# Patient Record
Sex: FEMALE | Race: WHITE | NOT HISPANIC OR LATINO | Employment: OTHER | URBAN - METROPOLITAN AREA
[De-identification: names, ages, dates, MRNs, and addresses within clinical notes are randomized per-mention and may not be internally consistent; named-entity substitution may affect disease eponyms.]

---

## 2017-11-01 ENCOUNTER — APPOINTMENT (OUTPATIENT)
Dept: ULTRASOUND IMAGING | Facility: CLINIC | Age: 57
DRG: 439 | End: 2017-11-01
Attending: STUDENT IN AN ORGANIZED HEALTH CARE EDUCATION/TRAINING PROGRAM

## 2017-11-01 ENCOUNTER — APPOINTMENT (OUTPATIENT)
Dept: CT IMAGING | Facility: CLINIC | Age: 57
DRG: 439 | End: 2017-11-01
Attending: STUDENT IN AN ORGANIZED HEALTH CARE EDUCATION/TRAINING PROGRAM

## 2017-11-01 ENCOUNTER — HOSPITAL ENCOUNTER (INPATIENT)
Facility: CLINIC | Age: 57
LOS: 7 days | Discharge: HOME | DRG: 439 | End: 2017-11-08
Attending: STUDENT IN AN ORGANIZED HEALTH CARE EDUCATION/TRAINING PROGRAM | Admitting: INTERNAL MEDICINE

## 2017-11-01 DIAGNOSIS — K85.90 ACUTE PANCREATITIS WITHOUT INFECTION OR NECROSIS, UNSPECIFIED PANCREATITIS TYPE: ICD-10-CM

## 2017-11-01 DIAGNOSIS — K86.89 PANCREATIC MASS: ICD-10-CM

## 2017-11-01 DIAGNOSIS — K85.91 ACUTE PANCREATITIS WITH UNINFECTED NECROSIS, UNSPECIFIED PANCREATITIS TYPE: Primary | ICD-10-CM

## 2017-11-01 PROBLEM — D72.829 LEUKOCYTOSIS: Status: ACTIVE | Noted: 2017-11-01

## 2017-11-01 PROBLEM — K76.0 FATTY LIVER: Status: ACTIVE | Noted: 2017-11-01

## 2017-11-01 LAB
ALBUMIN SERPL-MCNC: 4 G/DL (ref 3.4–5)
ALP SERPL-CCNC: 79 U/L (ref 40–150)
ALT SERPL W P-5'-P-CCNC: 37 U/L (ref 0–50)
ANION GAP SERPL CALCULATED.3IONS-SCNC: 11 MMOL/L (ref 3–14)
ANION GAP SERPL CALCULATED.3IONS-SCNC: 7 MMOL/L (ref 3–14)
AST SERPL W P-5'-P-CCNC: 28 U/L (ref 0–45)
BASOPHILS # BLD AUTO: 0 10E9/L (ref 0–0.2)
BASOPHILS # BLD AUTO: 0 10E9/L (ref 0–0.2)
BASOPHILS NFR BLD AUTO: 0.1 %
BASOPHILS NFR BLD AUTO: 0.2 %
BILIRUB SERPL-MCNC: 0.5 MG/DL (ref 0.2–1.3)
BUN SERPL-MCNC: 17 MG/DL (ref 7–30)
BUN SERPL-MCNC: 18 MG/DL (ref 7–30)
CALCIUM SERPL-MCNC: 8.3 MG/DL (ref 8.5–10.1)
CALCIUM SERPL-MCNC: 8.9 MG/DL (ref 8.5–10.1)
CHLORIDE SERPL-SCNC: 106 MMOL/L (ref 94–109)
CHLORIDE SERPL-SCNC: 107 MMOL/L (ref 94–109)
CHOLEST SERPL-MCNC: 200 MG/DL
CO2 SERPL-SCNC: 23 MMOL/L (ref 20–32)
CO2 SERPL-SCNC: 24 MMOL/L (ref 20–32)
CREAT SERPL-MCNC: 0.71 MG/DL (ref 0.52–1.04)
CREAT SERPL-MCNC: 0.92 MG/DL (ref 0.52–1.04)
DIFFERENTIAL METHOD BLD: ABNORMAL
DIFFERENTIAL METHOD BLD: ABNORMAL
EOSINOPHIL # BLD AUTO: 0 10E9/L (ref 0–0.7)
EOSINOPHIL # BLD AUTO: 0 10E9/L (ref 0–0.7)
EOSINOPHIL NFR BLD AUTO: 0 %
EOSINOPHIL NFR BLD AUTO: 0.1 %
ERYTHROCYTE [DISTWIDTH] IN BLOOD BY AUTOMATED COUNT: 11.9 % (ref 10–15)
ERYTHROCYTE [DISTWIDTH] IN BLOOD BY AUTOMATED COUNT: 12 % (ref 10–15)
ETHANOL SERPL-MCNC: <0.01 G/DL
GFR SERPL CREATININE-BSD FRML MDRD: 63 ML/MIN/1.7M2
GFR SERPL CREATININE-BSD FRML MDRD: 85 ML/MIN/1.7M2
GLUCOSE SERPL-MCNC: 168 MG/DL (ref 70–99)
GLUCOSE SERPL-MCNC: 185 MG/DL (ref 70–99)
HCT VFR BLD AUTO: 42.5 % (ref 35–47)
HCT VFR BLD AUTO: 44.6 % (ref 35–47)
HDLC SERPL-MCNC: 59 MG/DL
HGB BLD-MCNC: 14.3 G/DL (ref 11.7–15.7)
HGB BLD-MCNC: 15 G/DL (ref 11.7–15.7)
IMM GRANULOCYTES # BLD: 0 10E9/L (ref 0–0.4)
IMM GRANULOCYTES # BLD: 0.1 10E9/L (ref 0–0.4)
IMM GRANULOCYTES NFR BLD: 0.2 %
IMM GRANULOCYTES NFR BLD: 0.4 %
LACTATE BLD-SCNC: 2.3 MMOL/L (ref 0.7–2)
LDLC SERPL CALC-MCNC: 108 MG/DL
LIPASE SERPL-CCNC: ABNORMAL U/L (ref 73–393)
LYMPHOCYTES # BLD AUTO: 0.9 10E9/L (ref 0.8–5.3)
LYMPHOCYTES # BLD AUTO: 3.3 10E9/L (ref 0.8–5.3)
LYMPHOCYTES NFR BLD AUTO: 14 %
LYMPHOCYTES NFR BLD AUTO: 5.7 %
MCH RBC QN AUTO: 32.4 PG (ref 26.5–33)
MCH RBC QN AUTO: 32.5 PG (ref 26.5–33)
MCHC RBC AUTO-ENTMCNC: 33.6 G/DL (ref 31.5–36.5)
MCHC RBC AUTO-ENTMCNC: 33.6 G/DL (ref 31.5–36.5)
MCV RBC AUTO: 96 FL (ref 78–100)
MCV RBC AUTO: 97 FL (ref 78–100)
MONOCYTES # BLD AUTO: 0.8 10E9/L (ref 0–1.3)
MONOCYTES # BLD AUTO: 1.8 10E9/L (ref 0–1.3)
MONOCYTES NFR BLD AUTO: 5.3 %
MONOCYTES NFR BLD AUTO: 7.9 %
NEUTROPHILS # BLD AUTO: 13.1 10E9/L (ref 1.6–8.3)
NEUTROPHILS # BLD AUTO: 18 10E9/L (ref 1.6–8.3)
NEUTROPHILS NFR BLD AUTO: 77.4 %
NEUTROPHILS NFR BLD AUTO: 88.7 %
NONHDLC SERPL-MCNC: 141 MG/DL
PLATELET # BLD AUTO: 235 10E9/L (ref 150–450)
PLATELET # BLD AUTO: 313 10E9/L (ref 150–450)
POTASSIUM SERPL-SCNC: 3.6 MMOL/L (ref 3.4–5.3)
POTASSIUM SERPL-SCNC: 4.4 MMOL/L (ref 3.4–5.3)
PROT SERPL-MCNC: 7.9 G/DL (ref 6.8–8.8)
RBC # BLD AUTO: 4.41 10E12/L (ref 3.8–5.2)
RBC # BLD AUTO: 4.62 10E12/L (ref 3.8–5.2)
SODIUM SERPL-SCNC: 138 MMOL/L (ref 133–144)
SODIUM SERPL-SCNC: 140 MMOL/L (ref 133–144)
TRIGL SERPL-MCNC: 167 MG/DL
TROPONIN I SERPL-MCNC: <0.015 UG/L (ref 0–0.04)
WBC # BLD AUTO: 14.8 10E9/L (ref 4–11)
WBC # BLD AUTO: 23.2 10E9/L (ref 4–11)

## 2017-11-01 PROCEDURE — 76705 ECHO EXAM OF ABDOMEN: CPT

## 2017-11-01 PROCEDURE — 25000128 H RX IP 250 OP 636: Performed by: STUDENT IN AN ORGANIZED HEALTH CARE EDUCATION/TRAINING PROGRAM

## 2017-11-01 PROCEDURE — 25000128 H RX IP 250 OP 636: Performed by: PHYSICIAN ASSISTANT

## 2017-11-01 PROCEDURE — 76705 ECHO EXAM OF ABDOMEN: CPT | Mod: 26 | Performed by: STUDENT IN AN ORGANIZED HEALTH CARE EDUCATION/TRAINING PROGRAM

## 2017-11-01 PROCEDURE — 99285 EMERGENCY DEPT VISIT HI MDM: CPT | Mod: 25 | Performed by: STUDENT IN AN ORGANIZED HEALTH CARE EDUCATION/TRAINING PROGRAM

## 2017-11-01 PROCEDURE — 93005 ELECTROCARDIOGRAM TRACING: CPT

## 2017-11-01 PROCEDURE — 96376 TX/PRO/DX INJ SAME DRUG ADON: CPT

## 2017-11-01 PROCEDURE — 93010 ELECTROCARDIOGRAM REPORT: CPT | Mod: Z6 | Performed by: STUDENT IN AN ORGANIZED HEALTH CARE EDUCATION/TRAINING PROGRAM

## 2017-11-01 PROCEDURE — 80320 DRUG SCREEN QUANTALCOHOLS: CPT | Performed by: STUDENT IN AN ORGANIZED HEALTH CARE EDUCATION/TRAINING PROGRAM

## 2017-11-01 PROCEDURE — 25000125 ZZHC RX 250: Performed by: STUDENT IN AN ORGANIZED HEALTH CARE EDUCATION/TRAINING PROGRAM

## 2017-11-01 PROCEDURE — 99285 EMERGENCY DEPT VISIT HI MDM: CPT | Mod: 25

## 2017-11-01 PROCEDURE — 80048 BASIC METABOLIC PNL TOTAL CA: CPT | Performed by: PHYSICIAN ASSISTANT

## 2017-11-01 PROCEDURE — 83690 ASSAY OF LIPASE: CPT | Performed by: STUDENT IN AN ORGANIZED HEALTH CARE EDUCATION/TRAINING PROGRAM

## 2017-11-01 PROCEDURE — 84484 ASSAY OF TROPONIN QUANT: CPT | Performed by: STUDENT IN AN ORGANIZED HEALTH CARE EDUCATION/TRAINING PROGRAM

## 2017-11-01 PROCEDURE — 85025 COMPLETE CBC W/AUTO DIFF WBC: CPT | Performed by: STUDENT IN AN ORGANIZED HEALTH CARE EDUCATION/TRAINING PROGRAM

## 2017-11-01 PROCEDURE — 83605 ASSAY OF LACTIC ACID: CPT | Performed by: PHYSICIAN ASSISTANT

## 2017-11-01 PROCEDURE — 96375 TX/PRO/DX INJ NEW DRUG ADDON: CPT

## 2017-11-01 PROCEDURE — 80053 COMPREHEN METABOLIC PANEL: CPT | Performed by: STUDENT IN AN ORGANIZED HEALTH CARE EDUCATION/TRAINING PROGRAM

## 2017-11-01 PROCEDURE — 99221 1ST HOSP IP/OBS SF/LOW 40: CPT | Mod: AI | Performed by: PHYSICIAN ASSISTANT

## 2017-11-01 PROCEDURE — 36415 COLL VENOUS BLD VENIPUNCTURE: CPT | Performed by: PHYSICIAN ASSISTANT

## 2017-11-01 PROCEDURE — 99207 ZZC CDG-HISTORY COMP: MEETS EXP. PROB FOCUSED- DOWN CODED LACK OF PFSH: CPT | Performed by: PHYSICIAN ASSISTANT

## 2017-11-01 PROCEDURE — 74177 CT ABD & PELVIS W/CONTRAST: CPT

## 2017-11-01 PROCEDURE — 96361 HYDRATE IV INFUSION ADD-ON: CPT

## 2017-11-01 PROCEDURE — 20000003 ZZH R&B ICU

## 2017-11-01 PROCEDURE — 80061 LIPID PANEL: CPT | Performed by: STUDENT IN AN ORGANIZED HEALTH CARE EDUCATION/TRAINING PROGRAM

## 2017-11-01 PROCEDURE — 96374 THER/PROPH/DIAG INJ IV PUSH: CPT

## 2017-11-01 PROCEDURE — 85025 COMPLETE CBC W/AUTO DIFF WBC: CPT | Performed by: PHYSICIAN ASSISTANT

## 2017-11-01 PROCEDURE — 84145 PROCALCITONIN (PCT): CPT | Performed by: PHYSICIAN ASSISTANT

## 2017-11-01 RX ORDER — KETOROLAC TROMETHAMINE 30 MG/ML
15 INJECTION, SOLUTION INTRAMUSCULAR; INTRAVENOUS ONCE
Status: COMPLETED | OUTPATIENT
Start: 2017-11-01 | End: 2017-11-01

## 2017-11-01 RX ORDER — ACETAMINOPHEN 325 MG/1
650 TABLET ORAL EVERY 4 HOURS PRN
Status: DISCONTINUED | OUTPATIENT
Start: 2017-11-01 | End: 2017-11-08 | Stop reason: HOSPADM

## 2017-11-01 RX ORDER — DIPHENHYDRAMINE HYDROCHLORIDE 50 MG/ML
12.5 INJECTION INTRAMUSCULAR; INTRAVENOUS ONCE
Status: COMPLETED | OUTPATIENT
Start: 2017-11-01 | End: 2017-11-01

## 2017-11-01 RX ORDER — ONDANSETRON 2 MG/ML
4 INJECTION INTRAMUSCULAR; INTRAVENOUS ONCE
Status: COMPLETED | OUTPATIENT
Start: 2017-11-01 | End: 2017-11-01

## 2017-11-01 RX ORDER — NALOXONE HYDROCHLORIDE 0.4 MG/ML
.1-.4 INJECTION, SOLUTION INTRAMUSCULAR; INTRAVENOUS; SUBCUTANEOUS
Status: DISCONTINUED | OUTPATIENT
Start: 2017-11-01 | End: 2017-11-03

## 2017-11-01 RX ORDER — ONDANSETRON 4 MG/1
4 TABLET, ORALLY DISINTEGRATING ORAL EVERY 6 HOURS PRN
Status: DISCONTINUED | OUTPATIENT
Start: 2017-11-01 | End: 2017-11-08 | Stop reason: HOSPADM

## 2017-11-01 RX ORDER — HYDROMORPHONE HYDROCHLORIDE 1 MG/ML
.5-1 INJECTION, SOLUTION INTRAMUSCULAR; INTRAVENOUS; SUBCUTANEOUS
Status: COMPLETED | OUTPATIENT
Start: 2017-11-01 | End: 2017-11-02

## 2017-11-01 RX ORDER — PROCHLORPERAZINE 25 MG
25 SUPPOSITORY, RECTAL RECTAL EVERY 12 HOURS PRN
Status: DISCONTINUED | OUTPATIENT
Start: 2017-11-01 | End: 2017-11-01

## 2017-11-01 RX ORDER — HYDROMORPHONE HYDROCHLORIDE 1 MG/ML
0.5 INJECTION, SOLUTION INTRAMUSCULAR; INTRAVENOUS; SUBCUTANEOUS
Status: DISCONTINUED | OUTPATIENT
Start: 2017-11-01 | End: 2017-11-01

## 2017-11-01 RX ORDER — NALOXONE HYDROCHLORIDE 0.4 MG/ML
.1-.4 INJECTION, SOLUTION INTRAMUSCULAR; INTRAVENOUS; SUBCUTANEOUS
Status: DISCONTINUED | OUTPATIENT
Start: 2017-11-01 | End: 2017-11-01

## 2017-11-01 RX ORDER — PROCHLORPERAZINE MALEATE 10 MG
10 TABLET ORAL EVERY 6 HOURS PRN
Status: DISCONTINUED | OUTPATIENT
Start: 2017-11-01 | End: 2017-11-08 | Stop reason: HOSPADM

## 2017-11-01 RX ORDER — ONDANSETRON 2 MG/ML
4 INJECTION INTRAMUSCULAR; INTRAVENOUS EVERY 6 HOURS PRN
Status: DISCONTINUED | OUTPATIENT
Start: 2017-11-01 | End: 2017-11-08 | Stop reason: HOSPADM

## 2017-11-01 RX ORDER — IOPAMIDOL 755 MG/ML
86 INJECTION, SOLUTION INTRAVASCULAR ONCE
Status: COMPLETED | OUTPATIENT
Start: 2017-11-01 | End: 2017-11-01

## 2017-11-01 RX ORDER — LORAZEPAM 2 MG/ML
1-2 INJECTION INTRAMUSCULAR EVERY 30 MIN PRN
Status: DISCONTINUED | OUTPATIENT
Start: 2017-11-01 | End: 2017-11-05

## 2017-11-01 RX ORDER — PROCHLORPERAZINE MALEATE 10 MG
10 TABLET ORAL EVERY 6 HOURS PRN
Status: DISCONTINUED | OUTPATIENT
Start: 2017-11-01 | End: 2017-11-01

## 2017-11-01 RX ORDER — LORAZEPAM 1 MG/1
1-2 TABLET ORAL EVERY 30 MIN PRN
Status: DISCONTINUED | OUTPATIENT
Start: 2017-11-01 | End: 2017-11-05

## 2017-11-01 RX ORDER — ONDANSETRON 4 MG/1
4 TABLET, ORALLY DISINTEGRATING ORAL EVERY 6 HOURS PRN
Status: DISCONTINUED | OUTPATIENT
Start: 2017-11-01 | End: 2017-11-01

## 2017-11-01 RX ORDER — SODIUM CHLORIDE, SODIUM LACTATE, POTASSIUM CHLORIDE, CALCIUM CHLORIDE 600; 310; 30; 20 MG/100ML; MG/100ML; MG/100ML; MG/100ML
INJECTION, SOLUTION INTRAVENOUS CONTINUOUS
Status: ACTIVE | OUTPATIENT
Start: 2017-11-01 | End: 2017-11-02

## 2017-11-01 RX ORDER — ONDANSETRON 2 MG/ML
4 INJECTION INTRAMUSCULAR; INTRAVENOUS EVERY 6 HOURS PRN
Status: DISCONTINUED | OUTPATIENT
Start: 2017-11-01 | End: 2017-11-01

## 2017-11-01 RX ORDER — PROCHLORPERAZINE 25 MG
25 SUPPOSITORY, RECTAL RECTAL EVERY 12 HOURS PRN
Status: DISCONTINUED | OUTPATIENT
Start: 2017-11-01 | End: 2017-11-08 | Stop reason: HOSPADM

## 2017-11-01 RX ADMIN — SODIUM CHLORIDE, POTASSIUM CHLORIDE, SODIUM LACTATE AND CALCIUM CHLORIDE 1000 ML: 600; 310; 30; 20 INJECTION, SOLUTION INTRAVENOUS at 15:59

## 2017-11-01 RX ADMIN — HYDROMORPHONE HYDROCHLORIDE 0.5 MG: 1 INJECTION, SOLUTION INTRAMUSCULAR; INTRAVENOUS; SUBCUTANEOUS at 19:06

## 2017-11-01 RX ADMIN — ONDANSETRON 4 MG: 2 INJECTION INTRAMUSCULAR; INTRAVENOUS at 16:02

## 2017-11-01 RX ADMIN — PROCHLORPERAZINE EDISYLATE 10 MG: 5 INJECTION INTRAMUSCULAR; INTRAVENOUS at 20:03

## 2017-11-01 RX ADMIN — SODIUM CHLORIDE 63 ML: 9 INJECTION, SOLUTION INTRAVENOUS at 16:29

## 2017-11-01 RX ADMIN — SODIUM CHLORIDE, POTASSIUM CHLORIDE, SODIUM LACTATE AND CALCIUM CHLORIDE 1000 ML: 600; 310; 30; 20 INJECTION, SOLUTION INTRAVENOUS at 18:36

## 2017-11-01 RX ADMIN — KETOROLAC TROMETHAMINE 15 MG: 30 INJECTION, SOLUTION INTRAMUSCULAR at 15:59

## 2017-11-01 RX ADMIN — HYDROMORPHONE HYDROCHLORIDE 0.5 MG: 1 INJECTION, SOLUTION INTRAMUSCULAR; INTRAVENOUS; SUBCUTANEOUS at 20:37

## 2017-11-01 RX ADMIN — ENOXAPARIN SODIUM 40 MG: 40 INJECTION SUBCUTANEOUS at 22:24

## 2017-11-01 RX ADMIN — HYDROMORPHONE HYDROCHLORIDE 0.5 MG: 1 INJECTION, SOLUTION INTRAMUSCULAR; INTRAVENOUS; SUBCUTANEOUS at 23:30

## 2017-11-01 RX ADMIN — SODIUM CHLORIDE, POTASSIUM CHLORIDE, SODIUM LACTATE AND CALCIUM CHLORIDE: 600; 310; 30; 20 INJECTION, SOLUTION INTRAVENOUS at 21:30

## 2017-11-01 RX ADMIN — IOPAMIDOL 86 ML: 755 INJECTION, SOLUTION INTRAVENOUS at 16:29

## 2017-11-01 RX ADMIN — HYDROMORPHONE HYDROCHLORIDE 0.5 MG: 1 INJECTION, SOLUTION INTRAMUSCULAR; INTRAVENOUS; SUBCUTANEOUS at 22:24

## 2017-11-01 RX ADMIN — DIPHENHYDRAMINE HYDROCHLORIDE 12.5 MG: 50 INJECTION, SOLUTION INTRAMUSCULAR; INTRAVENOUS at 20:03

## 2017-11-01 RX ADMIN — SODIUM CHLORIDE, POTASSIUM CHLORIDE, SODIUM LACTATE AND CALCIUM CHLORIDE 1000 ML: 600; 310; 30; 20 INJECTION, SOLUTION INTRAVENOUS at 19:43

## 2017-11-01 RX ADMIN — HYDROMORPHONE HYDROCHLORIDE 0.5 MG: 1 INJECTION, SOLUTION INTRAMUSCULAR; INTRAVENOUS; SUBCUTANEOUS at 21:30

## 2017-11-01 RX ADMIN — ONDANSETRON 4 MG: 2 INJECTION INTRAMUSCULAR; INTRAVENOUS at 19:28

## 2017-11-01 ASSESSMENT — PAIN DESCRIPTION - DESCRIPTORS
DESCRIPTORS: ACHING

## 2017-11-01 NOTE — ED NOTES
Bed: ED20  Expected date: 11/1/17  Expected time: 3:36 PM  Means of arrival: Ambulance  Comments:  R sided abd pain

## 2017-11-01 NOTE — ED NOTES
R side abdominal pain started earlier today.  1 episode of diarrhea after pain started.  EMS found pt laying in bed, grimacing.  Pt had sudden onset of pain around 1 pm.  Nausea and vomiting with pain.  Pt had a few meds in ambulance, continues to have pain.  Pt has bluish lips, slightly pale.

## 2017-11-01 NOTE — IP AVS SNAPSHOT
MRN:[id number]                      After Visit Summary   11/1/2017    Kitty Bangura    MRN: [id number]           Thank you!     Thank you for choosing [facility] for your care. Our goal is always to provide you with excellent care. Hearing back from our patients is one way we can continue to improve our services. Please take a few minutes to complete the written survey that you may receive in the mail after you visit with us. Thank you!        Patient Information     Date Of Birth          1960        Designated Caregiver       Most Recent Value    Caregiver    Will someone help with your care after discharge? yes    Name of designated caregiver Andrew - spouse    Phone number of caregiver [phone]    Caregiver address [address], [place], [place]      About your hospital stay     You were admitted on:  November 1, 2017 You last received care in the:  [facility]    You were discharged on:  November 8, 2017        Reason for your hospital stay       Acute necrotizing pancreatitis                  Who to Call     For medical emergencies, please call 911.  For non-urgent questions about your medical care, please call your primary care provider or clinic, None          Attending Provider     Provider Specialty    Irineo Fuentes,  Emergency Medicine    Anais Norton MD Internal Medicine    IshStanley MD Family Practice    Belen Lord MD Internal Medicine       Primary Care Provider    Physician No Ref-Primary      Follow-up Appointments     Adult [facility] Follow-up and recommended labs and tests       Needs appt with Dr Alex Sanches in 1 week    Appointments on [place] and/or [facility] (with [facility] or [facility] provider or service). Call [phone] if you haven't heard regarding these appointments within 7 days of discharge.                  Additional Services     GASTROENTEROLOGY ADULT REF CONSULT ONLY       [facility]  [phone] - liver team - Dr. Sanches recommended outpatient follow-up       Please be aware that coverage of these services is subject to the terms and limitations of your health insurance plan.  Call member services at your health plan with any benefit or coverage questions.  Any procedures must be performed at a [facility] facility OR coordinated by your clinic's referral office.    Please bring the following with you to your appointment:    (1) Any X-Rays, CTs or MRIs which have been performed.  Contact the facility where they were done to arrange for  prior to your scheduled appointment.    (2) List of current medications   (3) This referral request   (4) Any documents/labs given to you for this referral                  Further instructions from your care team       Behavioral/Mental Health Resources  [place], [place], [place], [place], [place], [place], [place], [place] and [facility]    **Patient should check with their health insurance to identify providers in network**    Crisis Lines:   -[place]: [place] Crisis Connection: (927) 694-5741/1-832.527.7764   -[place]: (789) 831-7277    -[place]/ [place]/ [place]/ [place]/ [place]: [phone]   -[facility]: [facility] Crisis: [phone]   -[place] and [place]: [facility] Crisis Team (518) [phone] or 1-875.237.9008     Call the National Suicide Prevention Lifeline at 7-519-384-EJYN (9188) to be connected to a counselor at a crisis center in your area if you, a family member, or friend are experiencing   thoughts of suicide. The call is FREE, confidential, and always available.       National [place] on Mental Illness of [place] (TORI [place]) provides support groups and educational programs. Visit www.namihelps.org or call the TORI Helpline at 1-796.686.6383  Or 542-664-3008 for further information.       Crisis Mobile Serivces:   -[place]: GPMESS (351) 991-2537   -[place]/ [place]/ [place]/ [place]/ [place]: [phone]  [phone]   -[facility]: [facility]ST Group [phone]   -[place] and [facility]: ([phone] or ([phone]- [phone]    Chemical Dependency Detox:  - [facility]:  [phone] - can ask for other recommendations  - [facility]/[place]([facility]):  [phone]  - [facility]: [phone] [facility]  - [facility] ([facility]):  [phone]  - [place] [facility] : [phone] [facility] ([facility]):  [phone]  - [place] ([facility]):  [phone]    Chemical Dependency Assessment:   - [facility]: [phone]   - Behavioral Health Providers, can help find a provider near you:  [phone]  - No insurance- call county of residence and ask about applying for a Rule 25    Counseling/psychotherapy:  - Associated clinic of psychology - [place],/[place]/ [facility]/[place] /other locations: [phone]  - Behavioral Healthcare Providers- Can help find a provider near you:  [phone]  - [facility] Services-[place]:  [phone]  - [facility]- [place]:  [phone]  - [facility]- [place]:  [phone]  - [facility]-[place]: [phone]  - [facility]- [place]/[facility]/other locations:  [phone]  - [facility]- [facility]/[place]:  [phone]  - [facility] - [place]:  [phone]  - [facility] - [place]:  [phone]  [facility]- [place]-based in [place]:  [phone]  - [phone]  - [facility]- [place]: [phone]  - [facility]- [facility]:  [phone]  - [facility]- [place] [phone]  - [facility] counseling located in [place] (not affiliated with [place]): [phone]  - [facility]- [place]:   [phone]/[place]: [phone] and other locations.  - [facility]- [place]: [phone]  - [facility]- [place]:  [phone]  - [facility]- [place]: [phone]  - Walk in counseling center (Free counseling services)- [place]-[facility]: [phone]     Psychiatry/ Mental Health Medication management:  - Associated clinic of psychology- [place],/[place]/[facility]/ [place]/ multiple locations: [phone]  - Behavioral Healthcare Providers- Can help find a provider near you, if you have preferred one or [facility] they can identify who is in network and assist with scheduling an appointment:  [phone]  - [facility]: [place]/[facility] locations : [phone]  - [facility] - Dr Lobito Bridges and other associates. Collaborative model  with PCP involvement:  [phone] (requires PCP referral specifically)  - [facility]- [place]:  [phone]  - [facility] Services: [place]:   [phone] (Requires individual to be engaged in counseling)  - [facility]- [place]: [phone] [place]/[facility]:  [phone]/[place]:  [phone]/ [place]:  [phone]  - [facility]- [place]:   [phone]  - [facility]- [place], -[phone]  - [facility] Psychiatry - Medical students who rotate yearly:  [phone]    County Numbers  - [facility]: [phone]  - [facility]: [phone]  - [place]: [phone]  - [facility] : [phone]  - [facility]: [phone]  - [facility]: [phone]  - [facility]: [phone]  - [facility]: [phone]  - [facility]: [phone]  - [facility]: [phone]    **Please note that this list does not include all agencies that provide services**    Care Transitions Team at [facility] [phone]     [facility] will call you with a follow up appointment next week sometime if you need to call them call [phone] press 2         Pending Results     Date and Time Order Name Status Description    2017 0858 Clostridium difficile toxin B PCR In process     2017 1044 Sputum Culture Aerobic Bacterial Preliminary     2017 1420 Blood culture Preliminary     2017 1420 Blood culture Preliminary             Statement of Approval     Ordered          17 1303  I have reviewed and agree with all the recommendations and orders detailed in this document.  EFFECTIVE NOW     Approved and electronically signed by:  Belen Lord MD             Admission Information     Date & Time Provider Department Dept. Phone    2017 Belen Lord MD [facility] Intensive [facility] [phone]      Your Vitals Were     Blood Pressure Pulse Temperature Respirations Height Weight    136/87 (BP Location: Left arm) 85 98.8  F (37.1  C) (Oral) 18 1.651 m (5' 5\") 93.7 kg (206 lb 9.1 oz)    Pulse Oximetry BMI (Body Mass Index)                93% 34.38 kg/m2          Prisync Information     Prisync lets you send messages to your doctor, view your test results, renew your prescriptions, schedule appointments and more. To sign up, go to [email or web address] . Click on \"Log in\" on the left side of the screen, which will take you to the Welcome page. Then click on \"Sign up Now\" on the right side of the page.     You will be asked to enter the access code listed below, as well as some personal information. Please follow the directions to create your username and password.     Your access code is: [email or web address]  Expires: 2018  6:12 PM     Your access code will  in 90 days. If you need help or a new code, please call your [facility] or [phone].        Care EveryWhere ID     This is your Care EveryWhere ID. This could be used by other organizations to access your [facility] medical records  [id number]      "   Equal Access to Services     [facility] HospitalJANICE : Hadii aad ku hadmarileenina Sopaulo, waaxda luqadaha, qaybta kaalmafredy ferguson, re yip. So [facility] [phone].    ATENCIÓN: Si habla español, tiene a quiros disposición servicios gratuitos de asistencia lingüística. Macy al [phone].    We comply with applicable federal civil rights laws and [place] laws. We do not discriminate on the basis of race, color, national origin, age, disability, sex, sexual orientation, or gender identity.               Review of your medicines      START taking        Dose / Directions    acetaminophen 325 MG tablet   Commonly known as:  TYLENOL        Dose:  650 mg   Take 2 tablets (650 mg) by mouth every 4 hours as needed for mild pain   Quantity:  100 tablet   Refills:  0       amylase-lipase-protease 5000 UNITS Cpep   Commonly known as:  ZENPEP        Dose:  2 capsule   Take 2 capsules (10,000 Units) by mouth 3 times daily (with meals)   Quantity:  100 capsule   Refills:  0       HYDROcodone-acetaminophen 5-325 MG per tablet   Commonly known as:  NORCO        Dose:  0.5-1 tablet   Take 0.5-1 tablets by mouth every 6 hours as needed for moderate to severe pain   Quantity:  10 tablet   Refills:  0            Where to get your medicines      These medications were sent to [id number] [place], [place] - [address]  [address] [address]     Phone:  [phone]     amylase-lipase-protease 5000 UNITS Cpep         Some of these will need a paper prescription and others can be bought over the counter. Ask your nurse if you have questions.     Bring a paper prescription for each of these medications     HYDROcodone-acetaminophen 5-325 MG per tablet       You don't need a prescription for these medications     acetaminophen 325 MG tablet                Protect others around you: Learn how to safely use, store and throw away your medicines at www.disposemymeds.org.              Medication List: This is a list of all your medications and when to take them. Check marks below indicate your daily home schedule. Keep this list as a reference.      Medications           Morning Afternoon Evening Bedtime As Needed    acetaminophen 325 MG tablet   Commonly known as:  TYLENOL   Take 2 tablets (650 mg) by mouth every 4 hours as needed for mild pain   Last time this was given:  650 mg on 11/8/2017  3:57 AM                                amylase-lipase-protease 5000 UNITS Cpep   Commonly known as:  ZENPEP   Take 2 capsules (10,000 Units) by mouth 3 times daily (with meals)   Last time this was given:  10,000 Units on 11/8/2017  1:01 PM                                HYDROcodone-acetaminophen 5-325 MG per tablet   Commonly known as:  NORCO   Take 0.5-1 tablets by mouth every 6 hours as needed for moderate to severe pain   Last time this was given:  0.5 tablets on 11/8/2017 11:26 AM

## 2017-11-01 NOTE — IP AVS SNAPSHOT
` ` Patient Information     Patient Name Sex     Kitty Bangura ([id number]) Female 1960       Room Bed    1006 1006-01      Patient Demographics     Address Phone    [address] [address] [phone] (Home)      Patient Ethnicity & Race     Ethnic Group Patient Race    American White      Emergency Contact(s)     Name Relation Home Work [place]    NICOLASA BANGURA Spouse [phone]      Documents on File        Status Date Received Description       Documents for the Patient    Affiliate Privacy placeholder   phase3    Consent for EHR Access Received 17     Insurance Card       External Medication Information Consent       Patient ID       [facility] Specified Other       Consent for Services/Privacy Notice - Hospital/Clinic Received 17     Privacy Notice - [place] Received 17     Care Everywhere Prospective Auth Received 17        Documents for the Encounter    CMS IM for Patient Signature       CE Auth Form (Scanned)  17 CARE EVERYWHERE - [facility]    EMS/Ambulance Record  17 [facility] - EMS    ECG   ECG Report    CE Point of Care Auth Received        Admission Information     Attending Provider Admitting Provider Admission Type Admission Date/Time    Belen Lord MD Khan, Shams, MD Emergency 17  1532    Discharge Date Hospital Service Auth/Cert Status Service Area     Hospitalist [facility] SERVICES    Unit Room/Bed Admission Status       [place] [facility] 1006/1006-01 Admission (Confirmed)       Admission     Complaint    Pancreatitis, Pancreatitis      Hospital Account     Name Acct ID Class Status Primary Coverage    Kitty Bangura [id number] Inpatient Open None            Guarantor Account (for Hospital Account #[id number])     Name Relation to Pt Service Area Active? Acct Type    WillemKitty LUCAS Self FCS Yes Personal/Family    Address Phone          [address]  [place] [address] [phone](H)              Coverage Information (for Hospital Account #[id number])     Not on file

## 2017-11-01 NOTE — IP AVS SNAPSHOT
[facility]    [address] [address]    Phone:  [phone]    Fax:  [phone]                                       After Visit Summary   11/1/2017    Kitty Bangura    MRN: [id number]           After Visit Summary Signature Page     I have received my discharge instructions, and my questions have been answered. I have discussed any challenges I see with this plan with the nurse or doctor.    ..........................................................................................................................................  Patient/Patient Representative Signature      ..........................................................................................................................................  Patient Representative Print Name and Relationship to Patient    ..................................................               ................................................  Date                                            Time    ..........................................................................................................................................  Reviewed by Signature/Title    ...................................................              ..............................................  Date                                                            Time

## 2017-11-01 NOTE — ED NOTES
PT is resting in position at this time. All needs are being met and all comfort measures are being addressed. Awaiting MD dispo at this time. I will continue to assess and monitor PT.

## 2017-11-01 NOTE — ED PROVIDER NOTES
History     Chief Complaint   Patient presents with     Abdominal Pain     R side abdominal pain started earlier today.  1 episode of diarrhea.  EMS found pt laying in bed, grimacing.      HPI  Kitty Bangura is a 56 year old female who who presents for complaint of abdominal pain with nausea, vomiting, and diarrhea. Patient explains that shortly after 1 PM today she developed severe right-sided abdominal pain. The pain is generalized across the right side of her abdomen, sharp, constant and not cramping in nature. She has had 5 episodes of nonbloody emesis as well as 1 episode of diarrhea. She received 100 mg of fentanyl and 1 mg of Dilaudid via EMS prior to arrival. In the department she continues to complain of right-sided abdominal pain. She has otherwise been feeling well prior to this afternoon. No recent fever/chills, chest pain, cough, back pain, or genitourinary symptoms.      Problem List:    There are no active problems to display for this patient.       Past Medical History:    No past medical history on file.    Past Surgical History:    No past surgical history on file.    Family History:    No family history on file.    Social History:  Marital Status:   [2]  Social History   Substance Use Topics     Smoking status: Not on file     Smokeless tobacco: Not on file     Alcohol use Not on file        Medications:      No current outpatient prescriptions on file.      Review of Systems  Constitutional: Negative for fever or recent illness.  Respiratory: Negative for cough or shortness of breath.  Cardiovascular: Negative for chest pain.  Gastrointestinal: Positive for right sided abdominal pain with nausea, vomiting, and diarrhea. Denies blood with bowel movements.  Genitourinary: Negative for dysuria, hematuria, or pelvic pain.  Musculoskeletal: Negative for back pain or recent injuries.    All others reviewed and are negative.      Physical Exam   BP: 137/83  Heart Rate: 53  Temp: 97.5  F  (36.4  C)  Resp: 9  SpO2: 97 %      Physical Exam  Constitutional: Well developed, well nourished. Appears nontoxic but moderate abdominal discomfort.  Head: Normocephalic and atraumatic. Symmetric in appearance.  Eyes: Conjunctivae are normal.  Neck: Neck supple.  Cardiovascular: No cyanosis. Mild bradycardia with regular rhythm. No audible murmurs noted.   Respiratory: Effort normal, no respiratory distress. CTAB without diminished regions. No wheezing, rhonchi, or crackles.  Gastrointestinal: Soft, nondistended abdomen. Right mid and lower abdominal tenderness with guarding. No rigidity or rebound tenderness. Negative for Sol's sign. No palpable pulsatile mass.  Musculoskeletal: Moves all extremities spontaneously and without complaint.  Neuro: Patient is alert.  Skin: Skin is warm and dry, not diaphoretic.  Psych: Appears to have a normal mood and affect.      ED Course     ED Course     Procedures           EKG Interpretation:      Interpreted by: Irineo Fuentes  Time reviewed: Upon arrival     Symptoms at time of EKG: Abdominal pain  Rhythm: Sinus  Rate: Normal  Axis: Normal    Conduction: None atypical   ST Segments/ T Waves: No pathologic ST-elevations, anterior T-wave inversions may be baseline  Q Waves: None  Comparison to prior: No recent available    Clinical Impression: No sign of ischemia         Critical Care time:  none               Results for orders placed or performed during the hospital encounter of 11/01/17 (from the past 24 hour(s))   CBC with platelets differential   Result Value Ref Range    WBC 23.2 (H) 4.0 - 11.0 10e9/L    RBC Count 4.62 3.8 - 5.2 10e12/L    Hemoglobin 15.0 11.7 - 15.7 g/dL    Hematocrit 44.6 35.0 - 47.0 %    MCV 97 78 - 100 fl    MCH 32.5 26.5 - 33.0 pg    MCHC 33.6 31.5 - 36.5 g/dL    RDW 11.9 10.0 - 15.0 %    Platelet Count 313 150 - 450 10e9/L    Diff Method Automated Method     % Neutrophils 77.4 %    % Lymphocytes 14.0 %    % Monocytes 7.9 %    % Eosinophils 0.1 %     % Basophils 0.2 %    % Immature Granulocytes 0.4 %    Absolute Neutrophil 18.0 (H) 1.6 - 8.3 10e9/L    Absolute Lymphocytes 3.3 0.8 - 5.3 10e9/L    Absolute Monocytes 1.8 (H) 0.0 - 1.3 10e9/L    Absolute Eosinophils 0.0 0.0 - 0.7 10e9/L    Absolute Basophils 0.0 0.0 - 0.2 10e9/L    Abs Immature Granulocytes 0.1 0 - 0.4 10e9/L   Comprehensive metabolic panel   Result Value Ref Range    Sodium 140 133 - 144 mmol/L    Potassium 3.6 3.4 - 5.3 mmol/L    Chloride 106 94 - 109 mmol/L    Carbon Dioxide 23 20 - 32 mmol/L    Anion Gap 11 3 - 14 mmol/L    Glucose 168 (H) 70 - 99 mg/dL    Urea Nitrogen 18 7 - 30 mg/dL    Creatinine 0.92 0.52 - 1.04 mg/dL    GFR Estimate 63 >60 mL/min/1.7m2    GFR Estimate If Black 76 >60 mL/min/1.7m2    Calcium 8.9 8.5 - 10.1 mg/dL    Bilirubin Total 0.5 0.2 - 1.3 mg/dL    Albumin 4.0 3.4 - 5.0 g/dL    Protein Total 7.9 6.8 - 8.8 g/dL    Alkaline Phosphatase 79 40 - 150 U/L    ALT 37 0 - 50 U/L    AST 28 0 - 45 U/L   Lipase   Result Value Ref Range    Lipase 54314 (H) 73 - 393 U/L   Troponin I   Result Value Ref Range    Troponin I ES <0.015 0.000 - 0.045 ug/L   Alcohol ethyl   Result Value Ref Range    Ethanol g/dL <0.01 <0.01 g/dL   Lipid panel reflex to direct LDL   Result Value Ref Range    Cholesterol 200 (H) <200 mg/dL    Triglycerides 167 (H) <150 mg/dL    HDL Cholesterol 59 >49 mg/dL    LDL Cholesterol Calculated 108 (H) <100 mg/dL    Non HDL Cholesterol 141 (H) <130 mg/dL   POC US ABDOMEN LIMITED    [facility] Procedure Note      Limited Bedside ED Gallbladder  Ultrasound:    PROCEDURE: PERFORMED BY: Dr. Irineo Fuentes  INDICATIONS:  Abdominal Pain  PROBE:  Low frequency convex probe  BODY LOCATION: Abdomen  FINDINGS:   An ultrasound of the gallbladder was performed using longitudinal and transverse views.  Gallstone(s):  Absent  Gallbladder sludge:  Absent  Sonographic Sol's sign:  Absent  Gallbladder wall thickening (greater than 4 mm):   Absent  Pericholecystic fluid: Absent  Common bile duct (dilated if internal diameter greater than 6 mm): 3-4 mm  INTERPRETATION:  The gallbladder evaluation is normal with no gallstones/sludge, no sonographic Sol s sign, no GB wall thickening, no pericholecystic fluid, and without evidence of cholelithiasis or cholecystitis.  IMAGE DOCUMENTATION: Images were archived to PACs system.       CT Abdomen Pelvis w Contrast    Narrative    CT ABDOMEN AND PELVIS WITH CONTRAST 11/1/2017 4:45 PM     HISTORY: Acute severe right-sided abdominal pain.    CONTRAST DOSE: 86 mL [id number].    Radiation dose for this scan was reduced using automated exposure  control, adjustment of the mA and/or kV according to patient size, or  iterative reconstruction technique.    FINDINGS: Retroperitoneal stranding is noted about the pancreatic head  and duodenum. 3 cm mass with marginal calcification is noted within  the pancreatic tail. Hepatic fatty infiltration is noted. The spleen,  adrenal glands, kidneys, and gallbladder appear within normal limits.  There is a normal-appearing appendix. No evidence of bowel obstruction  or pericolonic inflammatory stranding. Mild prominence of the left  colonic wall is likely related to relative decompression. No free  peritoneal fluid or air is demonstrated. Pelvic contents are otherwise  unremarkable.      Impression    IMPRESSION:  1. 3 cm heterogeneous mass within the tail of the pancreas. A few  calcifications are noted along the lateral margin of the mass.  2. Prominent retroperitoneal stranding about the pancreatic head and  duodenum. This is nonspecific, but could be related to superimposed  pancreatitis. There is no apparent free peritoneal air or fluid.  3. Hepatic fatty infiltration--possible etiologies include consumption  of alcohol or excessive carbohydrate intake, especially  sugar/fructose. Metabolic syndrome commonly occurs in combination with  nonalcoholic fatty liver disease.  Although often reversible,  nonalcoholic fatty liver disease can progress to steatohepatitis and  cirrhosis.    SACHIN REYES MD   Abdomen US, limited (RUQ only)    Narrative    US ABDOMEN LIMITED 11/1/2017 6:25 PM     HISTORY: Pancreatitis, abdominal pain    COMPARISON: 11/1/2017 CT    FINDINGS: No gallbladder wall thickening or cholelithiasis is  demonstrated. The common bile duct is borderline in caliber. Hepatic  increased echotexture, suggesting fatty infiltration, limits  penetration of the liver. The pancreatic tail was obscured. The right  kidney appeared within normal limits.      Impression    IMPRESSION: Hepatic fatty infiltration. The tail of the pancreas could  not be visualized.         Assessments & Plan (with Medical Decision Making)   Kitty Bangura is a 56 year old female who presented to the department for complaint of acute onset mid right abdominal pain beginning around 1 PM. The pain is associated with nausea, vomiting, and single episode of nonbloody diarrhea. She does not recall similar symptoms in the past and has no history of kidney stones, appendicitis, or cholecystitis. Her CBC reveals significant leukocytosis and lipase value of >40,000 which is consistent with acute pancreatitis. CT scan of abdomen/pelvis identifies retroperitoneal stranding of the pancreatic head and watt numb, however there is an incidental 3 cm heterogenous mass of the tail of the pancreas. Formal ultrasound of RUQ is without cholecystitis or biliary obstruction.    The patient will require admission for pain control and medical management of acute pancreatitis. She admits to drinking too reasonable sized glasses of wine every day but denies excessive drinking or binge drinking. She does not take any routine medications and has no history of hyperlipidemia but also admits to not routinely seeing medical professional. Consulted pancreatic gastroenterologist Dr. Sanches of [place] regarding patient's lab values  as well as imaging findings. 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 He recommends admission at [facility] for medical management of hospitalist service is comfortable, transferred to the [place] would not likely involve workup for the incidental pancreatic tail mass but instead would receive outpatient workup such as endoscopic ultrasound. He also suggests consultation [place] [facility] service as needed for management or discharge planning/follow-up. [facility] agrees with plan for medical admission and hydration with IV lLctated Ringer's. Temporary transition orders were placed per protocol.    The patient and accompanying family have been informed of her results and the recommendation for admission. They have verbalized an understanding, all questions answered, and they are in agreement with the plan at this time.      Disclaimer: This note consists of symbols derived from keyboarding, dictation, and/or voice recognition software. As a result, there may be errors in the script that have gone undetected.  Please consider this when interpreting information found in the chart.        I have reviewed the nursing notes.    I have reviewed the findings, diagnosis, plan and need for follow up with the patient.       New Prescriptions    No medications on file       Final diagnoses:   Acute pancreatitis without infection or necrosis, unspecified pancreatitis type   Pancreatic mass       11/1/2017   [facility] EMERGENCY DEPARTMENT     Irineo Fuentes DO  11/01/17 1927

## 2017-11-02 LAB
ALBUMIN SERPL-MCNC: 3 G/DL (ref 3.4–5)
ALBUMIN UR-MCNC: 30 MG/DL
ALP SERPL-CCNC: 59 U/L (ref 40–150)
ALT SERPL W P-5'-P-CCNC: 31 U/L (ref 0–50)
AMORPH CRY #/AREA URNS HPF: ABNORMAL /HPF
ANION GAP SERPL CALCULATED.3IONS-SCNC: 7 MMOL/L (ref 3–14)
APPEARANCE UR: ABNORMAL
AST SERPL W P-5'-P-CCNC: 22 U/L (ref 0–45)
BASOPHILS # BLD AUTO: 0 10E9/L (ref 0–0.2)
BASOPHILS NFR BLD AUTO: 0.1 %
BILIRUB SERPL-MCNC: 0.7 MG/DL (ref 0.2–1.3)
BILIRUB UR QL STRIP: NEGATIVE
BUN SERPL-MCNC: 18 MG/DL (ref 7–30)
CALCIUM SERPL-MCNC: 8 MG/DL (ref 8.5–10.1)
CHLORIDE SERPL-SCNC: 107 MMOL/L (ref 94–109)
CO2 SERPL-SCNC: 25 MMOL/L (ref 20–32)
COLOR UR AUTO: YELLOW
CREAT SERPL-MCNC: 0.82 MG/DL (ref 0.52–1.04)
DIFFERENTIAL METHOD BLD: ABNORMAL
EOSINOPHIL # BLD AUTO: 0 10E9/L (ref 0–0.7)
EOSINOPHIL NFR BLD AUTO: 0.1 %
ERYTHROCYTE [DISTWIDTH] IN BLOOD BY AUTOMATED COUNT: 12 % (ref 10–15)
GFR SERPL CREATININE-BSD FRML MDRD: 71 ML/MIN/1.7M2
GLUCOSE SERPL-MCNC: 189 MG/DL (ref 70–99)
GLUCOSE UR STRIP-MCNC: 150 MG/DL
HCT VFR BLD AUTO: 42.8 % (ref 35–47)
HGB BLD-MCNC: 14.3 G/DL (ref 11.7–15.7)
HGB UR QL STRIP: ABNORMAL
IMM GRANULOCYTES # BLD: 0 10E9/L (ref 0–0.4)
IMM GRANULOCYTES NFR BLD: 0.2 %
KETONES UR STRIP-MCNC: 40 MG/DL
LACTATE BLD-SCNC: 1.9 MMOL/L (ref 0.7–2)
LEUKOCYTE ESTERASE UR QL STRIP: NEGATIVE
LIPASE SERPL-CCNC: 4075 U/L (ref 73–393)
LYMPHOCYTES # BLD AUTO: 1 10E9/L (ref 0.8–5.3)
LYMPHOCYTES NFR BLD AUTO: 8 %
MCH RBC QN AUTO: 32.2 PG (ref 26.5–33)
MCHC RBC AUTO-ENTMCNC: 33.4 G/DL (ref 31.5–36.5)
MCV RBC AUTO: 96 FL (ref 78–100)
MONOCYTES # BLD AUTO: 1.1 10E9/L (ref 0–1.3)
MONOCYTES NFR BLD AUTO: 8.7 %
MUCOUS THREADS #/AREA URNS LPF: PRESENT /LPF
NEUTROPHILS # BLD AUTO: 10.5 10E9/L (ref 1.6–8.3)
NEUTROPHILS NFR BLD AUTO: 82.9 %
NITRATE UR QL: NEGATIVE
PH UR STRIP: 6 PH (ref 5–7)
PLATELET # BLD AUTO: 244 10E9/L (ref 150–450)
POTASSIUM SERPL-SCNC: 4.7 MMOL/L (ref 3.4–5.3)
PROCALCITONIN SERPL-MCNC: 0.21 NG/ML
PROT SERPL-MCNC: 6.3 G/DL (ref 6.8–8.8)
RBC # BLD AUTO: 4.44 10E12/L (ref 3.8–5.2)
RBC #/AREA URNS AUTO: 0 /HPF (ref 0–2)
SODIUM SERPL-SCNC: 139 MMOL/L (ref 133–144)
SOURCE: ABNORMAL
SP GR UR STRIP: 1.04 (ref 1–1.03)
UROBILINOGEN UR STRIP-MCNC: NORMAL MG/DL (ref 0–2)
WBC # BLD AUTO: 12.7 10E9/L (ref 4–11)
WBC #/AREA URNS AUTO: 0 /HPF (ref 0–2)

## 2017-11-02 PROCEDURE — 25000128 H RX IP 250 OP 636: Performed by: FAMILY MEDICINE

## 2017-11-02 PROCEDURE — S0028 INJECTION, FAMOTIDINE, 20 MG: HCPCS | Performed by: FAMILY MEDICINE

## 2017-11-02 PROCEDURE — 81001 URINALYSIS AUTO W/SCOPE: CPT | Performed by: PHYSICIAN ASSISTANT

## 2017-11-02 PROCEDURE — 85025 COMPLETE CBC W/AUTO DIFF WBC: CPT | Performed by: PHYSICIAN ASSISTANT

## 2017-11-02 PROCEDURE — 36415 COLL VENOUS BLD VENIPUNCTURE: CPT | Performed by: PHYSICIAN ASSISTANT

## 2017-11-02 PROCEDURE — 80053 COMPREHEN METABOLIC PANEL: CPT | Performed by: PHYSICIAN ASSISTANT

## 2017-11-02 PROCEDURE — 83690 ASSAY OF LIPASE: CPT | Performed by: PHYSICIAN ASSISTANT

## 2017-11-02 PROCEDURE — 99233 SBSQ HOSP IP/OBS HIGH 50: CPT | Performed by: FAMILY MEDICINE

## 2017-11-02 PROCEDURE — 25000128 H RX IP 250 OP 636: Performed by: PHYSICIAN ASSISTANT

## 2017-11-02 PROCEDURE — 25000125 ZZHC RX 250: Performed by: PHYSICIAN ASSISTANT

## 2017-11-02 PROCEDURE — 12000010 ZZH R&B MS INTERMEDIATE OVERFLOW

## 2017-11-02 PROCEDURE — 25000128 H RX IP 250 OP 636: Performed by: INTERNAL MEDICINE

## 2017-11-02 PROCEDURE — 25000128 H RX IP 250 OP 636: Performed by: STUDENT IN AN ORGANIZED HEALTH CARE EDUCATION/TRAINING PROGRAM

## 2017-11-02 PROCEDURE — 25000125 ZZHC RX 250: Performed by: FAMILY MEDICINE

## 2017-11-02 PROCEDURE — 83605 ASSAY OF LACTIC ACID: CPT | Performed by: PHYSICIAN ASSISTANT

## 2017-11-02 RX ORDER — SODIUM CHLORIDE, SODIUM LACTATE, POTASSIUM CHLORIDE, CALCIUM CHLORIDE 600; 310; 30; 20 MG/100ML; MG/100ML; MG/100ML; MG/100ML
INJECTION, SOLUTION INTRAVENOUS CONTINUOUS
Status: ACTIVE | OUTPATIENT
Start: 2017-11-02 | End: 2017-11-03

## 2017-11-02 RX ORDER — HYDROMORPHONE HYDROCHLORIDE 1 MG/ML
0.5 INJECTION, SOLUTION INTRAMUSCULAR; INTRAVENOUS; SUBCUTANEOUS
Status: DISCONTINUED | OUTPATIENT
Start: 2017-11-02 | End: 2017-11-04

## 2017-11-02 RX ORDER — MORPHINE SULFATE 2 MG/ML
2-4 INJECTION, SOLUTION INTRAMUSCULAR; INTRAVENOUS
Status: DISCONTINUED | OUTPATIENT
Start: 2017-11-02 | End: 2017-11-05

## 2017-11-02 RX ADMIN — HYDROMORPHONE HYDROCHLORIDE 0.5 MG: 1 INJECTION, SOLUTION INTRAMUSCULAR; INTRAVENOUS; SUBCUTANEOUS at 16:48

## 2017-11-02 RX ADMIN — HYDROMORPHONE HYDROCHLORIDE 0.5 MG: 1 INJECTION, SOLUTION INTRAMUSCULAR; INTRAVENOUS; SUBCUTANEOUS at 11:36

## 2017-11-02 RX ADMIN — ENOXAPARIN SODIUM 40 MG: 40 INJECTION SUBCUTANEOUS at 21:29

## 2017-11-02 RX ADMIN — HYDROMORPHONE HYDROCHLORIDE 0.5 MG: 1 INJECTION, SOLUTION INTRAMUSCULAR; INTRAVENOUS; SUBCUTANEOUS at 13:03

## 2017-11-02 RX ADMIN — SODIUM CHLORIDE, POTASSIUM CHLORIDE, SODIUM LACTATE AND CALCIUM CHLORIDE: 600; 310; 30; 20 INJECTION, SOLUTION INTRAVENOUS at 13:12

## 2017-11-02 RX ADMIN — FOLIC ACID: 5 INJECTION, SOLUTION INTRAMUSCULAR; INTRAVENOUS; SUBCUTANEOUS at 00:55

## 2017-11-02 RX ADMIN — SODIUM CHLORIDE, POTASSIUM CHLORIDE, SODIUM LACTATE AND CALCIUM CHLORIDE: 600; 310; 30; 20 INJECTION, SOLUTION INTRAVENOUS at 00:59

## 2017-11-02 RX ADMIN — HYDROMORPHONE HYDROCHLORIDE 0.5 MG: 1 INJECTION, SOLUTION INTRAMUSCULAR; INTRAVENOUS; SUBCUTANEOUS at 10:06

## 2017-11-02 RX ADMIN — FAMOTIDINE 20 MG: 10 INJECTION, SOLUTION INTRAVENOUS at 10:06

## 2017-11-02 RX ADMIN — HYDROMORPHONE HYDROCHLORIDE 0.5 MG: 1 INJECTION, SOLUTION INTRAMUSCULAR; INTRAVENOUS; SUBCUTANEOUS at 04:57

## 2017-11-02 RX ADMIN — ONDANSETRON 4 MG: 2 INJECTION INTRAMUSCULAR; INTRAVENOUS at 11:36

## 2017-11-02 RX ADMIN — ONDANSETRON 4 MG: 2 INJECTION INTRAMUSCULAR; INTRAVENOUS at 02:50

## 2017-11-02 RX ADMIN — MORPHINE SULFATE 2 MG: 2 INJECTION, SOLUTION INTRAMUSCULAR; INTRAVENOUS at 22:05

## 2017-11-02 RX ADMIN — HYDROMORPHONE HYDROCHLORIDE 0.5 MG: 1 INJECTION, SOLUTION INTRAMUSCULAR; INTRAVENOUS; SUBCUTANEOUS at 00:55

## 2017-11-02 RX ADMIN — HYDROMORPHONE HYDROCHLORIDE 0.5 MG: 1 INJECTION, SOLUTION INTRAMUSCULAR; INTRAVENOUS; SUBCUTANEOUS at 14:25

## 2017-11-02 RX ADMIN — MORPHINE SULFATE 2 MG: 2 INJECTION, SOLUTION INTRAMUSCULAR; INTRAVENOUS at 22:20

## 2017-11-02 RX ADMIN — HYDROMORPHONE HYDROCHLORIDE 0.5 MG: 1 INJECTION, SOLUTION INTRAMUSCULAR; INTRAVENOUS; SUBCUTANEOUS at 18:32

## 2017-11-02 RX ADMIN — FAMOTIDINE 20 MG: 10 INJECTION, SOLUTION INTRAVENOUS at 21:29

## 2017-11-02 RX ADMIN — HYDROMORPHONE HYDROCHLORIDE 0.5 MG: 1 INJECTION, SOLUTION INTRAMUSCULAR; INTRAVENOUS; SUBCUTANEOUS at 07:53

## 2017-11-02 RX ADMIN — HYDROMORPHONE HYDROCHLORIDE 0.5 MG: 1 INJECTION, SOLUTION INTRAMUSCULAR; INTRAVENOUS; SUBCUTANEOUS at 19:28

## 2017-11-02 RX ADMIN — SODIUM CHLORIDE, POTASSIUM CHLORIDE, SODIUM LACTATE AND CALCIUM CHLORIDE: 600; 310; 30; 20 INJECTION, SOLUTION INTRAVENOUS at 07:53

## 2017-11-02 RX ADMIN — MORPHINE SULFATE 4 MG: 2 INJECTION, SOLUTION INTRAMUSCULAR; INTRAVENOUS at 19:59

## 2017-11-02 RX ADMIN — HYDROMORPHONE HYDROCHLORIDE 0.5 MG: 1 INJECTION, SOLUTION INTRAMUSCULAR; INTRAVENOUS; SUBCUTANEOUS at 06:47

## 2017-11-02 RX ADMIN — HYDROMORPHONE HYDROCHLORIDE 0.5 MG: 1 INJECTION, SOLUTION INTRAMUSCULAR; INTRAVENOUS; SUBCUTANEOUS at 03:03

## 2017-11-02 ASSESSMENT — PAIN DESCRIPTION - DESCRIPTORS: DESCRIPTORS: CONSTANT

## 2017-11-02 NOTE — PROGRESS NOTES
"[place] [facility] [place] [facility] ADMISSION NOTE    Patient admitted to room 2306 at approximately 2040 via cart from emergency room. Patient was accompanied by spouse and significant other.     Verbal SBAR report received from Francheska prior to patient arrival.     Patient trasferred to bed via air lara. Patient alert and oriented X 3. Pain is not well controlled.  Medication(s) being used: prescription NSAID's including Toradol given in ED before arrival and narcotic analgesics including Dilaudid given in ED before arrival. 0-10 Pain Scale: 5. Admission vital signs: Blood pressure 150/75, pulse 68, temperature 97.6  F (36.4  C), temperature source Oral, resp. rate 16, height 1.651 m (5' 5\"), weight 90.4 kg (199 lb 4.7 oz), SpO2 94 %. Patient and spouse were oriented to plan of care, call light, bed controls, tv, telephone and bathroom.     The following safety risks were identified during admission: none. Yellow risk band applied: NO.     Susannah Joseph    "

## 2017-11-02 NOTE — PLAN OF CARE
"Problem: Patient Care Overview  Goal: Plan of Care/Patient Progress Review  Outcome: No Change  Pt continues to have level 5-7 diffuse abdominal pain which radiates to back, no nausea. Abdomen distended, rounded and firm. Pt states neg flatus, last BM yest. BS positive. Taking 0.5 mg dilaudid every 1-2 hours, \"only takes edge off\", NPO. Ambulated to bathroom with assist of one, unsteady, moving very slowly, voided 75cc cloudy kendra urine, urine out 15 cc/hr since 1200, web paged Dr. Melgoza.       "

## 2017-11-02 NOTE — PROGRESS NOTES
"SPIRITUAL HEALTH SERVICES  SPIRITUAL ASSESSMENT Progress Note  [facility] [place] [facility] - Med/Surg    Received spiritual health referral from patient or family.  I checked in with pt and , Andrew.  Pt, Kitty, was sleeping and the room was very dark to allow her to rest.  She woke briefly and welcomed introduction.  She stated she was hoping that \"things would get better fast.\"  , Andrew, stated that she was getting better.  I offered on-going support as Kitty gets more able to receive support and engage around any needs that she would like to identify.  Pt and  know how to ask for f/up visit.    Fausto Bryant M.A., [facility]  Staff   [facility]  Office: [phone]  Cell: [phone]  Pager [phone]    "

## 2017-11-02 NOTE — ED NOTES
.DATE:  11/1/2017   TIME OF RECEIPT FROM LAB:  1910  LAB TEST:  Glucose  LAB VALUE:  731  RESULTS GIVEN WITH READ-BACK TO (PROVIDER):  Dr. Li  TIME LAB VALUE REPORTED TO PROVIDER:   1914

## 2017-11-02 NOTE — PROGRESS NOTES
Marked abd. W/ marker and measured abd. Distention from line to line for contrast during night, First reading is 24.5 inches w/ pt lying flat on back.

## 2017-11-02 NOTE — ED NOTES
Patient still having pain  Medications given for the move to the floor  Patient  Is alert  But having a lot of pain

## 2017-11-02 NOTE — PLAN OF CARE
Problem: Pancreatitis, Acute/Chronic (Adult)  Goal: Signs and Symptoms of Listed Potential Problems Will be Absent, Minimized or Managed (Pancreatitis, Acute/Chronic)  Signs and symptoms of listed potential problems will be absent, minimized or managed by discharge/transition of care (reference Pancreatitis, Acute/Chronic (Adult) CPG).   Pt continues to have the same pain, midline and lower abdomen,mostly to the right side and also the back. She states it is sharp at times but always there. She states the dilaudid does help but does not take it away.pt able to reposition self to comfort, does have pain with movement. Only up to the bedside commode x 1 . UA still needed.

## 2017-11-02 NOTE — PROGRESS NOTES
Per Dr. Melgoza continue to infuse fluids at 150 ml/hr and monitor u/o, if continues to be low notify on call physician

## 2017-11-02 NOTE — PROGRESS NOTES
[facility] Hospitalist Progress Note           Assessment and Plan:     Kitty Bangura is a 56 year old female with no significant PMH who now presents with abdominal pain.        Acute pancreatitis  11/1/17 -- Presented with hours of right sided abdominal pain, nausea, vomiting and 1 episode of diarrhea.  Lipase [id number].  Received 3L of LR in ED. Suspect alcoholic.    - NPO except for medications, continue IVF at 250cc/hr of LR, pain management with IV dilaudid     11/2/2017 -- improving based on symptoms and labs, but still having significant pain.  Continue NPO on dilaudid today, consider starting clears tomorrow if pain doing well.      Alcohol Dependence  11/1/17 -- Reports drinking 2-3 glasses of wine daily  On admission, does not appear to be going through withdrawals  - CIWA protocol in place  - thiamine, folic acid, multivitamin, magnesium/phosphorus therapy initiated. (IV given NPO)  11/2/2017 -- CIWAs good so far.      Pancreatic mass  11/1/17 -- Discussed with Dr. Sanches of the [facility] (GI).  No acute intervention of this mass.  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    - will need close follow up with outpatient GI for ongoing evaluation of new-found mass  11/2/2017 -- plan for outpatient follow-up with GI on discharge.         Leukocytosis with elevated lactic - consistent with SIRS from pancreatitis as above  11/1/17 -- WBC 23.2 on arrival.  Significant stranding seen on CT. Per Dr. Sanches (see above), appears aggressive and suggests that rapid administration of LR would be prudent to avoid/derail the development of necrosis.  Given 3L of LR.  11/2/2017 -- clinical picture improving, WBC down to 12 from 23 on admission without antibiotics, procalcitonin low risk for systemic infection, afebrile, lactic elevated yesterday but normalized with continuing LR at 250/h overnight.  Overall consistent with improving SIRS due to pancreatitis as above,  "nothing for acute bacterial infection at present.  Continue LR at 250/h for now, likely slow this later today or tomorrow.        Fatty liver  11/1/17 -- Seen on US.  Follow-up with primary care provider as outpatient and with GI as above.      DVT Prophylaxis: Lovenox for DVT, ranitidine IV while NPO.       Code Status: Full Code    Lines  PIV    Disposition  Improving, but anticipate at least 2-3 more days inpatient even if continues to improve quickly.              Interval History:   Improving - says she feels \"clearly better\" than yesterday, but still having 8-10 pain in her upper abdomen but says it's better and more localized now than yesterday when it was a \"10\" throughout her abdomen.  Some intermittent nausea, no vomiting.  No fever or chills currently, felt some mild ones yesterday.  No dyspnea.  No new concerns.  Overall still feels \"crummy\" but much better.             Review of Systems:    ROS: 10 point ROS neg other than the symptoms noted above in the HPI.             Medications:   Current active medications and PTA medications reviewed, see medication list for details.            Physical Exam:   Vitals were reviewed  Patient Vitals for the past 24 hrs:   BP Temp Temp src Pulse Heart Rate Resp SpO2 Height Weight   11/02/17 0742 158/83 98.4  F (36.9  C) Oral - 60 18 97 % - -   11/02/17 0600 144/89 - - - 61 20 97 % - -   11/02/17 0400 160/87 - - - 55 16 98 % - -   11/02/17 0200 (!) 166/98 98.3  F (36.8  C) Oral - 60 16 98 % - -   11/02/17 0000 - - - - 57 20 96 % - -   11/01/17 2350 - - - - - - - - 88.5 kg (195 lb 1.7 oz)   11/01/17 2347 - - - - - - 95 % - -   11/01/17 2332 148/89 - - - 58 20 (!) 86 % - -   11/01/17 2052 150/75 97.6  F (36.4  C) Oral 68 - 16 94 % 1.651 m (5' 5\") 90.4 kg (199 lb 4.7 oz)   11/01/17 2000 162/81 - - - 72 - 98 % - -   11/01/17 1945 - - - - 62 - 97 % - -   11/01/17 1930 (!) 165/95 - - - 58 - 100 % - -   11/01/17 1925 - - - - - - 100 % - -   11/01/17 1915 - - - - 58 - 99 % - " -   17 1900 (!) 161/92 - - - 51 - 100 % - -   17 1830 144/85 - - - 51 16 99 % - -   17 1730 143/90 - - - - - 100 % - -   17 1715 - - - - - - 100 % - -   17 1700 (!) 154/92 - - - 60 16 100 % - -   17 1645 - - - - - - 100 % - -   17 1642 (!) 158/95 - - - 60 14 100 % - -   17 1600 - - - - 49 13 98 % - -   17 1540 137/83 97.5  F (36.4  C) Oral - 53 20 96 % - -   17 1538 137/83 - - - 53 9 97 % - -       Temperatures:  Current - Temp: 98.4  F (36.9  C); Max - Temp  Av  F (36.7  C)  Min: 97.5  F (36.4  C)  Max: 98.4  F (36.9  C)  Respiration range: Resp  Av.5  Min: 9  Max: 20  Pulse range: Pulse  Av  Min: 68  Max: 68  Blood pressure range: Systolic (24hrs), Av , Min:137 , Max:166   ; Diastolic (24hrs), Av, Min:75, Max:98    Pulse oximetry range: SpO2  Av.6 %  Min: 86 %  Max: 100 %  I/O last 3 completed shifts:  In: 4750 [I.V.:1750; IV Piggyback:3000]  Out: 600 [Urine:600]    Intake/Output Summary (Last 24 hours) at 17 0800  Last data filed at 17 0700   Gross per 24 hour   Intake             5000 ml   Output              600 ml   Net             4400 ml     EXAM:  General: awake and alert, NAD, oriented x 3  Head: normocephalic  Neck: unremarkable, no lymphadenopathy   HEENT: oropharynx pink and moist    Heart: Regular rate and rhythm, no murmurs, rubs, or gallops  Lungs: clear to auscultation bilaterally with good air movement throughout, no crackles, no distress  Abdomen: soft, no masses or organomegaly, still notably tender in upper abdomen without rebound or guarding, minimal tenderness in nearby areas of abdomen, none in lower abdomen.  Bowel sounds present, perhaps mildly hypoactive.  + flatus per patient.    Extremities: no edema in lower extremities   Skin unremarkable.               Data:     Results for orders placed or performed during the hospital encounter of 17 (from the past 24 hour(s))   CBC with  platelets differential   Result Value Ref Range    WBC 23.2 (H) 4.0 - 11.0 10e9/L    RBC Count 4.62 3.8 - 5.2 10e12/L    Hemoglobin 15.0 11.7 - 15.7 g/dL    Hematocrit 44.6 35.0 - 47.0 %    MCV 97 78 - 100 fl    MCH 32.5 26.5 - 33.0 pg    MCHC 33.6 31.5 - 36.5 g/dL    RDW 11.9 10.0 - 15.0 %    Platelet Count 313 150 - 450 10e9/L    Diff Method Automated Method     % Neutrophils 77.4 %    % Lymphocytes 14.0 %    % Monocytes 7.9 %    % Eosinophils 0.1 %    % Basophils 0.2 %    % Immature Granulocytes 0.4 %    Absolute Neutrophil 18.0 (H) 1.6 - 8.3 10e9/L    Absolute Lymphocytes 3.3 0.8 - 5.3 10e9/L    Absolute Monocytes 1.8 (H) 0.0 - 1.3 10e9/L    Absolute Eosinophils 0.0 0.0 - 0.7 10e9/L    Absolute Basophils 0.0 0.0 - 0.2 10e9/L    Abs Immature Granulocytes 0.1 0 - 0.4 10e9/L   Comprehensive metabolic panel   Result Value Ref Range    Sodium 140 133 - 144 mmol/L    Potassium 3.6 3.4 - 5.3 mmol/L    Chloride 106 94 - 109 mmol/L    Carbon Dioxide 23 20 - 32 mmol/L    Anion Gap 11 3 - 14 mmol/L    Glucose 168 (H) 70 - 99 mg/dL    Urea Nitrogen 18 7 - 30 mg/dL    Creatinine 0.92 0.52 - 1.04 mg/dL    GFR Estimate 63 >60 mL/min/1.7m2    GFR Estimate If Black 76 >60 mL/min/1.7m2    Calcium 8.9 8.5 - 10.1 mg/dL    Bilirubin Total 0.5 0.2 - 1.3 mg/dL    Albumin 4.0 3.4 - 5.0 g/dL    Protein Total 7.9 6.8 - 8.8 g/dL    Alkaline Phosphatase 79 40 - 150 U/L    ALT 37 0 - 50 U/L    AST 28 0 - 45 U/L   Lipase   Result Value Ref Range    Lipase 77163 (H) 73 - 393 U/L   Troponin I   Result Value Ref Range    Troponin I ES <0.015 0.000 - 0.045 ug/L   Alcohol ethyl   Result Value Ref Range    Ethanol g/dL <0.01 <0.01 g/dL   Lipid panel reflex to direct LDL   Result Value Ref Range    Cholesterol 200 (H) <200 mg/dL    Triglycerides 167 (H) <150 mg/dL    HDL Cholesterol 59 >49 mg/dL    LDL Cholesterol Calculated 108 (H) <100 mg/dL    Non HDL Cholesterol 141 (H) <130 mg/dL   POC US ABDOMEN LIMITED    Impression    [facility]  Procedure Note      Limited Bedside ED Gallbladder  Ultrasound:    PROCEDURE: PERFORMED BY: Dr. Irineo Fuentes  INDICATIONS:  Abdominal Pain  PROBE:  Low frequency convex probe  BODY LOCATION: Abdomen  FINDINGS:   An ultrasound of the gallbladder was performed using longitudinal and transverse views.  Gallstone(s):  Absent  Gallbladder sludge:  Absent  Sonographic Sol's sign:  Absent  Gallbladder wall thickening (greater than 4 mm):  Absent  Pericholecystic fluid: Absent  Common bile duct (dilated if internal diameter greater than 6 mm): 3-4 mm  INTERPRETATION:  The gallbladder evaluation is normal with no gallstones/sludge, no sonographic Sol s sign, no GB wall thickening, no pericholecystic fluid, and without evidence of cholelithiasis or cholecystitis.  IMAGE DOCUMENTATION: Images were archived to PACs system.       CT Abdomen Pelvis w Contrast    Narrative    CT ABDOMEN AND PELVIS WITH CONTRAST 11/1/2017 4:45 PM     HISTORY: Acute severe right-sided abdominal pain.    CONTRAST DOSE: 86 mL [id number].    Radiation dose for this scan was reduced using automated exposure  control, adjustment of the mA and/or kV according to patient size, or  iterative reconstruction technique.    FINDINGS: Retroperitoneal stranding is noted about the pancreatic head  and duodenum. 3 cm mass with marginal calcification is noted within  the pancreatic tail. Hepatic fatty infiltration is noted. The spleen,  adrenal glands, kidneys, and gallbladder appear within normal limits.  There is a normal-appearing appendix. No evidence of bowel obstruction  or pericolonic inflammatory stranding. Mild prominence of the left  colonic wall is likely related to relative decompression. No free  peritoneal fluid or air is demonstrated. Pelvic contents are otherwise  unremarkable.      Impression    IMPRESSION:  1. 3 cm heterogeneous mass within the tail of the pancreas. A few  calcifications are noted along the lateral margin of the mass.  2.  Prominent retroperitoneal stranding about the pancreatic head and  duodenum. This is nonspecific, but could be related to superimposed  pancreatitis. There is no apparent free peritoneal air or fluid.  3. Hepatic fatty infiltration--possible etiologies include consumption  of alcohol or excessive carbohydrate intake, especially  sugar/fructose. Metabolic syndrome commonly occurs in combination with  nonalcoholic fatty liver disease. Although often reversible,  nonalcoholic fatty liver disease can progress to steatohepatitis and  cirrhosis.    SACHIN REYES MD   Abdomen US, limited ([facility]Q only)    Narrative    US ABDOMEN LIMITED 11/1/2017 6:25 PM     HISTORY: Pancreatitis, abdominal pain    COMPARISON: 11/1/2017 CT    FINDINGS: No gallbladder wall thickening or cholelithiasis is  demonstrated. The common bile duct is borderline in caliber. Hepatic  increased echotexture, suggesting fatty infiltration, limits  penetration of the liver. The pancreatic tail was obscured. The right  kidney appeared within normal limits.      Impression    IMPRESSION: Hepatic fatty infiltration. The tail of the pancreas could  not be visualized.    SACHIN REYES MD   Procalcitonin   Result Value Ref Range    Procalcitonin 0.21 ng/ml   Lactic acid whole blood   Result Value Ref Range    Lactic Acid 2.3 (H) 0.7 - 2.0 mmol/L   CBC with platelets differential   Result Value Ref Range    WBC 14.8 (H) 4.0 - 11.0 10e9/L    RBC Count 4.41 3.8 - 5.2 10e12/L    Hemoglobin 14.3 11.7 - 15.7 g/dL    Hematocrit 42.5 35.0 - 47.0 %    MCV 96 78 - 100 fl    MCH 32.4 26.5 - 33.0 pg    MCHC 33.6 31.5 - 36.5 g/dL    RDW 12.0 10.0 - 15.0 %    Platelet Count 235 150 - 450 10e9/L    Diff Method Automated Method     % Neutrophils 88.7 %    % Lymphocytes 5.7 %    % Monocytes 5.3 %    % Eosinophils 0.0 %    % Basophils 0.1 %    % Immature Granulocytes 0.2 %    Absolute Neutrophil 13.1 (H) 1.6 - 8.3 10e9/L    Absolute Lymphocytes 0.9 0.8 - 5.3 10e9/L    Absolute  Monocytes 0.8 0.0 - 1.3 10e9/L    Absolute Eosinophils 0.0 0.0 - 0.7 10e9/L    Absolute Basophils 0.0 0.0 - 0.2 10e9/L    Abs Immature Granulocytes 0.0 0 - 0.4 10e9/L   Basic metabolic panel   Result Value Ref Range    Sodium 138 133 - 144 mmol/L    Potassium 4.4 3.4 - 5.3 mmol/L    Chloride 107 94 - 109 mmol/L    Carbon Dioxide 24 20 - 32 mmol/L    Anion Gap 7 3 - 14 mmol/L    Glucose 185 (H) 70 - 99 mg/dL    Urea Nitrogen 17 7 - 30 mg/dL    Creatinine 0.71 0.52 - 1.04 mg/dL    GFR Estimate 85 >60 mL/min/1.7m2    GFR Estimate If Black >90 >60 mL/min/1.7m2    Calcium 8.3 (L) 8.5 - 10.1 mg/dL   CBC with platelets differential   Result Value Ref Range    WBC 12.7 (H) 4.0 - 11.0 10e9/L    RBC Count 4.44 3.8 - 5.2 10e12/L    Hemoglobin 14.3 11.7 - 15.7 g/dL    Hematocrit 42.8 35.0 - 47.0 %    MCV 96 78 - 100 fl    MCH 32.2 26.5 - 33.0 pg    MCHC 33.4 31.5 - 36.5 g/dL    RDW 12.0 10.0 - 15.0 %    Platelet Count 244 150 - 450 10e9/L    Diff Method Automated Method     % Neutrophils 82.9 %    % Lymphocytes 8.0 %    % Monocytes 8.7 %    % Eosinophils 0.1 %    % Basophils 0.1 %    % Immature Granulocytes 0.2 %    Absolute Neutrophil 10.5 (H) 1.6 - 8.3 10e9/L    Absolute Lymphocytes 1.0 0.8 - 5.3 10e9/L    Absolute Monocytes 1.1 0.0 - 1.3 10e9/L    Absolute Eosinophils 0.0 0.0 - 0.7 10e9/L    Absolute Basophils 0.0 0.0 - 0.2 10e9/L    Abs Immature Granulocytes 0.0 0 - 0.4 10e9/L   Comprehensive metabolic panel   Result Value Ref Range    Sodium 139 133 - 144 mmol/L    Potassium 4.7 3.4 - 5.3 mmol/L    Chloride 107 94 - 109 mmol/L    Carbon Dioxide 25 20 - 32 mmol/L    Anion Gap 7 3 - 14 mmol/L    Glucose 189 (H) 70 - 99 mg/dL    Urea Nitrogen 18 7 - 30 mg/dL    Creatinine 0.82 0.52 - 1.04 mg/dL    GFR Estimate 71 >60 mL/min/1.7m2    GFR Estimate If Black 86 >60 mL/min/1.7m2    Calcium 8.0 (L) 8.5 - 10.1 mg/dL    Bilirubin Total 0.7 0.2 - 1.3 mg/dL    Albumin 3.0 (L) 3.4 - 5.0 g/dL    Protein Total 6.3 (L) 6.8 - 8.8 g/dL     Alkaline Phosphatase 59 40 - 150 U/L    ALT 31 0 - 50 U/L    AST 22 0 - 45 U/L   Lipase   Result Value Ref Range    Lipase 4075 (H) 73 - 393 U/L   Lactic acid whole blood   Result Value Ref Range    Lactic Acid 1.9 0.7 - 2.0 mmol/L   UA reflex to Microscopic and Culture   Result Value Ref Range    Color Urine Yellow     Appearance Urine Cloudy     Glucose Urine 150 (A) NEG^Negative mg/dL    Bilirubin Urine Negative NEG^Negative    Ketones Urine 40 (A) NEG^Negative mg/dL    Specific Gravity Urine 1.039 (H) 1.003 - 1.035    Blood Urine Trace (A) NEG^Negative    pH Urine 6.0 5.0 - 7.0 pH    Protein Albumin Urine 30 (A) NEG^Negative mg/dL    Urobilinogen mg/dL Normal 0.0 - 2.0 mg/dL    Nitrite Urine Negative NEG^Negative    Leukocyte Esterase Urine Negative NEG^Negative    Source Midstream Urine     RBC Urine 0 0 - 2 /HPF    WBC Urine 0 0 - 2 /HPF    Mucous Urine Present (A) NEG^Negative /LPF    Amorphous Crystals Many (A) NEG^Negative /HPF           Attestation:  I have reviewed today's vital signs, notes, medications, labs and imaging.  Amount of time performed on this daily note: 40 minutes.     Stanley Melgoza MD, MD

## 2017-11-02 NOTE — PROGRESS NOTES
[facility] TRANSPORT NOTE  Data:   Reason for Transport:  Transfer to ICU    Kitty Bangura was transported to ICU via cart at 2335.  Patient was accompanied by Registered Nurse. Equipment used for transport: None. Family was aware of reason for transport: no (ICU nurse will contact family)    Action:  Report: given to Shivani    Response:  Patient's condition when transferred off unit was comfortable after IV Dilaudid.    Susannah Joseph

## 2017-11-02 NOTE — PROGRESS NOTES
[facility]    Hospital Medicine   Care Update  Date of Service: 11/1/2017     I was called due to elevated lactic acid 2.3.  This was a SCHEDULED lab, not a BPA.    Given abdominal exam and pancreatitis, will repeat labs urgently.  CBC and BMP ordered and pending.  Has already received 3L of LR and is currently on 250cc/hr of LR.    Given fluid administration and improving WBC, will not add any more fluid at this time.    Per discussion with Dr. Norton, the patient will also be transferred to ICU for close monitoring overnight given severity of pancreatitis.      Soledad Arriola PA-C

## 2017-11-02 NOTE — H&P
[facility]    History and Physical  Hospital Medicine       Date of Admission:  11/1/2017  Date of Service: 11/1/2017     Primary Care Physician   No Ref-Primary, Physician None    Assessment & Plan   Kitty Banguar is a 56 year old female with no significant PMH who now presents with abdominal pain.      Acute pancreatitis  Presented with hours of right sided abdominal pain, nausea, vomiting and 1 episode of diarrhea.  Lipase 98695.  Received 3L of LR in ED.  DDx: sequale of EtOH consumption versus sequelae of new pancreatic mass.  - add on lactic acid  - NPO except for medications  - continue IVF at 250cc/hr of LR  - pain management with IV dilaudid      Alcohol Dependence  Reports drinking 2-3 glasses of wine daily  On admission, does not appear to be going through withdrawals  - [facility] protocol in place  - thiamine, folic acid, multivitamin, magnesium/phosphorus therapy initiated. (IV given NPO)  - initiate lorazepam therapy, PRN    Pancreatic mass  Discussed with Dr. Sanches of the [place] (GI).  No acute intervention of this mass.  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    - will need close follow up with outpatient GI for ongoing evaluation of new-found mass      Leukocytosis  WBC 23.2 on arrival.  Significant stranding seen on CT. Per Dr. Sanches (see above), appears aggressive and suggests that rapid administration of LR would be prudent to avoid/derail the development of necrosis.  Given 3L of LR.  - add on procalcitonin  - order CBC in AM  - vitals Q4 hours with serial abdominal exams      Fatty liver  See on US.         F: 250cc/hr of LR  E: BMP in AM  N: NPO  DVT Prophylaxis: Lovenox    Code Status: Full Code    Disposition: Anticipate discharge in 2-3 days. Appropriate for inpatient care.     Case discussed with Dr. Anais Norton.  Assessment and plan as written above.    Soledad Arriola PA-C  [facility] Hospitalist  Program    History is obtained from the patient and review of the EMR.    Past Medical History  - no pertinent history    Past Surgical History  - no pertinent history    Family History  - no pertinent history    History of Present Illness   Kitty Bangura is a 56 year old female with no significant PMH who now presents with abdominal pain.    The patient is in quite a bit of pain and as such, is a poor historian.  She notes onset of RUQ pain this afternoon at 1pm.  This radiated to the rest of her abdomen, but seemed to be located primarily in the RUQ.  The pan was a 10/10 and is described as sharp, stabbing and squeezing.  Nothing made this better.  Mobility and deep inspiration made this worse.  The patient had associated nausea and vomiting.  She estimates that she threw up 8 times today.  There was no blood tinged emesis or coffee ground emesis.  She had mild fever/chills at home, but did not take her temperature.  She denies recent black/bloody stools.    Upon arrival to the ER, shew as given 0.5mg dilaudid x2.  Shew as given 15mg Toradol once.  She received 3L of LR.  Her pain has minimally improved.    ROS: Denies cold-like symptoms (congestion, pharyngitis, sinus pressure, rhinorrhea), swollen glands, headaches, lightheadedness, dizziness, chest pain, palpitations/flutters, SOB, cough, wheezes, diarrhea/constipation, dysuria, hematuria, joint swelling, joint pain, leg swelling, and rashes.          Prior to Admission Medications   None       Allergies   No Known Allergies    Social History   Social History     Social History     Marital status:      Spouse name: N/A     Number of children: N/A     Years of education: N/A     Occupational History     Not on file.     Social History Main Topics     Smoking status: Former Smoker     Quit date: 1992     Smokeless tobacco: Not on file     Alcohol use 12.6 oz/week     21 Glasses of wine per week     Drug use: No     Sexual activity: Not on file     Other  "Topics Concern     Not on file     Social History Narrative     No narrative on file       Physical Exam     /75 (BP Location: Left arm)  Pulse 68  Temp 97.6  F (36.4  C) (Oral)  Resp 16  Ht 1.651 m (5' 5\")  Wt 90.4 kg (199 lb 4.7 oz)  SpO2 94%  BMI 33.16 kg/m2     Weight: 199 lbs 4.73 oz Body mass index is 33.16 kg/(m^2).     Constitutional: Alert and oriented x4.  Cooperative.  Appears stated age.  Appears in distress secondary to abdominal pain.   HEENT: Oropharynx is clear and moist. No evidence of cranial trauma.  Lymph/Hematologic: No occipital, submental, submandibular, anterior or posterior cervical, or supraclavicular lymphadenopathy is appreciated.  Cardiovascular: Regular rate/ rhythm.  S1 and S2 grossly normal.  No appreciable murmur, rub, gallop.  No lower extremity edema.  Respiratory: Clear to auscultation bilaterally. Equal chest expansion.  GI: Mild distention.  Hypoactive BS. Tender to palpation of entirety of abdomen, however the majority of this pain is located in the RUQ.  No rebound tenderness.  Some pain with shaking of her bed, but no pain with heel strike testing.   Genitourinary: Deferred  Musculoskeletal: Normal muscle bulk and tone.  Skin: Warm and dry, no rashes.   Neurologic: Neck supple. Cranial nerves 3-12 are grossly intact.  is symmetric.     Data   Data reviewed today:     Recent Labs  Lab 11/01/17  1547   WBC 23.2*   HGB 15.0   MCV 97         POTASSIUM 3.6   CHLORIDE 106   CO2 23   BUN 18   CR 0.92   ANIONGAP 11   ILIANA 8.9   *   ALBUMIN 4.0   PROTTOTAL 7.9   BILITOTAL 0.5   ALKPHOS 79   ALT 37   AST 28   LIPASE 78239*   TROPI <0.015       Recent Results (from the past 24 hour(s))   POC US ABDOMEN LIMITED    Impression    [facility] Procedure Note      Limited Bedside ED Gallbladder  Ultrasound:    PROCEDURE: PERFORMED BY: Dr. Irineo Fuentes  INDICATIONS:  Abdominal Pain  PROBE:  Low frequency convex probe  BODY LOCATION: Abdomen  FINDINGS: "   An ultrasound of the gallbladder was performed using longitudinal and transverse views.  Gallstone(s):  Absent  Gallbladder sludge:  Absent  Sonographic Sol's sign:  Absent  Gallbladder wall thickening (greater than 4 mm):  Absent  Pericholecystic fluid: Absent  Common bile duct (dilated if internal diameter greater than 6 mm): 3-4 mm  INTERPRETATION:  The gallbladder evaluation is normal with no gallstones/sludge, no sonographic Sol s sign, no GB wall thickening, no pericholecystic fluid, and without evidence of cholelithiasis or cholecystitis.  IMAGE DOCUMENTATION: Images were archived to PACs system.       CT Abdomen Pelvis w Contrast    Narrative    CT ABDOMEN AND PELVIS WITH CONTRAST 11/1/2017 4:45 PM     HISTORY: Acute severe right-sided abdominal pain.    CONTRAST DOSE: 86 mL Isovue-370.    Radiation dose for this scan was reduced using automated exposure  control, adjustment of the mA and/or kV according to patient size, or  iterative reconstruction technique.    FINDINGS: Retroperitoneal stranding is noted about the pancreatic head  and duodenum. 3 cm mass with marginal calcification is noted within  the pancreatic tail. Hepatic fatty infiltration is noted. The spleen,  adrenal glands, kidneys, and gallbladder appear within normal limits.  There is a normal-appearing appendix. No evidence of bowel obstruction  or pericolonic inflammatory stranding. Mild prominence of the left  colonic wall is likely related to relative decompression. No free  peritoneal fluid or air is demonstrated. Pelvic contents are otherwise  unremarkable.      Impression    IMPRESSION:  1. 3 cm heterogeneous mass within the tail of the pancreas. A few  calcifications are noted along the lateral margin of the mass.  2. Prominent retroperitoneal stranding about the pancreatic head and  duodenum. This is nonspecific, but could be related to superimposed  pancreatitis. There is no apparent free peritoneal air or fluid.  3. Hepatic  fatty infiltration--possible etiologies include consumption  of alcohol or excessive carbohydrate intake, especially  sugar/fructose. Metabolic syndrome commonly occurs in combination with  nonalcoholic fatty liver disease. Although often reversible,  nonalcoholic fatty liver disease can progress to steatohepatitis and  cirrhosis.    SACHIN REYES MD   Abdomen US, limited (RUQ only)    Narrative    US ABDOMEN LIMITED 11/1/2017 6:25 PM     HISTORY: Pancreatitis, abdominal pain    COMPARISON: 11/1/2017 CT    FINDINGS: No gallbladder wall thickening or cholelithiasis is  demonstrated. The common bile duct is borderline in caliber. Hepatic  increased echotexture, suggesting fatty infiltration, limits  penetration of the liver. The pancreatic tail was obscured. The right  kidney appeared within normal limits.      Impression    IMPRESSION: Hepatic fatty infiltration. The tail of the pancreas could  not be visualized.       I personally reviewed the EKG tracing showing sinus bradycardia.    Soledad Arriola PA-C  [facility]

## 2017-11-02 NOTE — PROGRESS NOTES
Called 's cell phone and home phone . Left message stating room change and update on condition as unchanged.

## 2017-11-02 NOTE — PLAN OF CARE
Problem: Pancreatitis, Acute/Chronic (Adult)  Goal: Signs and Symptoms of Listed Potential Problems Will be Absent, Minimized or Managed (Pancreatitis, Acute/Chronic)  Signs and symptoms of listed potential problems will be absent, minimized or managed by discharge/transition of care (reference Pancreatitis, Acute/Chronic (Adult) CPG).   Pt up to bedside commode with assist 1. Painful abdomen with any movement. UA sent and another .5mg dilaudid given for pain. Ice to back of neck when feeling hot. No new issues noted.

## 2017-11-03 LAB
ALBUMIN SERPL-MCNC: 2.6 G/DL (ref 3.4–5)
ALP SERPL-CCNC: 47 U/L (ref 40–150)
ALT SERPL W P-5'-P-CCNC: 23 U/L (ref 0–50)
ANION GAP SERPL CALCULATED.3IONS-SCNC: 5 MMOL/L (ref 3–14)
AST SERPL W P-5'-P-CCNC: 57 U/L (ref 0–45)
BASOPHILS # BLD AUTO: 0 10E9/L (ref 0–0.2)
BASOPHILS # BLD AUTO: 0 10E9/L (ref 0–0.2)
BASOPHILS NFR BLD AUTO: 0.1 %
BASOPHILS NFR BLD AUTO: 0.1 %
BILIRUB SERPL-MCNC: 1 MG/DL (ref 0.2–1.3)
BUN SERPL-MCNC: 19 MG/DL (ref 7–30)
CALCIUM SERPL-MCNC: 7.6 MG/DL (ref 8.5–10.1)
CHLORIDE SERPL-SCNC: 108 MMOL/L (ref 94–109)
CO2 SERPL-SCNC: 26 MMOL/L (ref 20–32)
CREAT SERPL-MCNC: 0.81 MG/DL (ref 0.52–1.04)
DIFFERENTIAL METHOD BLD: ABNORMAL
DIFFERENTIAL METHOD BLD: ABNORMAL
EOSINOPHIL # BLD AUTO: 0 10E9/L (ref 0–0.7)
EOSINOPHIL # BLD AUTO: 0 10E9/L (ref 0–0.7)
EOSINOPHIL NFR BLD AUTO: 0 %
EOSINOPHIL NFR BLD AUTO: 0.1 %
ERYTHROCYTE [DISTWIDTH] IN BLOOD BY AUTOMATED COUNT: 12.3 % (ref 10–15)
ERYTHROCYTE [DISTWIDTH] IN BLOOD BY AUTOMATED COUNT: 12.6 % (ref 10–15)
GFR SERPL CREATININE-BSD FRML MDRD: 73 ML/MIN/1.7M2
GLUCOSE SERPL-MCNC: 162 MG/DL (ref 70–99)
HCT VFR BLD AUTO: 38.2 % (ref 35–47)
HCT VFR BLD AUTO: 38.5 % (ref 35–47)
HGB BLD-MCNC: 12.5 G/DL (ref 11.7–15.7)
HGB BLD-MCNC: 12.5 G/DL (ref 11.7–15.7)
IMM GRANULOCYTES # BLD: 0.1 10E9/L (ref 0–0.4)
IMM GRANULOCYTES # BLD: 0.1 10E9/L (ref 0–0.4)
IMM GRANULOCYTES NFR BLD: 0.4 %
IMM GRANULOCYTES NFR BLD: 0.4 %
LIPASE SERPL-CCNC: 4972 U/L (ref 73–393)
LYMPHOCYTES # BLD AUTO: 1.4 10E9/L (ref 0.8–5.3)
LYMPHOCYTES # BLD AUTO: 1.4 10E9/L (ref 0.8–5.3)
LYMPHOCYTES NFR BLD AUTO: 7.7 %
LYMPHOCYTES NFR BLD AUTO: 8 %
MCH RBC QN AUTO: 32.2 PG (ref 26.5–33)
MCH RBC QN AUTO: 32.3 PG (ref 26.5–33)
MCHC RBC AUTO-ENTMCNC: 32.5 G/DL (ref 31.5–36.5)
MCHC RBC AUTO-ENTMCNC: 32.7 G/DL (ref 31.5–36.5)
MCV RBC AUTO: 99 FL (ref 78–100)
MCV RBC AUTO: 99 FL (ref 78–100)
MONOCYTES # BLD AUTO: 1.6 10E9/L (ref 0–1.3)
MONOCYTES # BLD AUTO: 1.8 10E9/L (ref 0–1.3)
MONOCYTES NFR BLD AUTO: 10.4 %
MONOCYTES NFR BLD AUTO: 8.5 %
NEUTROPHILS # BLD AUTO: 14 10E9/L (ref 1.6–8.3)
NEUTROPHILS # BLD AUTO: 15.5 10E9/L (ref 1.6–8.3)
NEUTROPHILS NFR BLD AUTO: 81.1 %
NEUTROPHILS NFR BLD AUTO: 83.2 %
PLATELET # BLD AUTO: 181 10E9/L (ref 150–450)
PLATELET # BLD AUTO: 204 10E9/L (ref 150–450)
POTASSIUM SERPL-SCNC: 4.2 MMOL/L (ref 3.4–5.3)
PROT SERPL-MCNC: 5.6 G/DL (ref 6.8–8.8)
RBC # BLD AUTO: 3.87 10E12/L (ref 3.8–5.2)
RBC # BLD AUTO: 3.88 10E12/L (ref 3.8–5.2)
SODIUM SERPL-SCNC: 139 MMOL/L (ref 133–144)
WBC # BLD AUTO: 17.2 10E9/L (ref 4–11)
WBC # BLD AUTO: 18.7 10E9/L (ref 4–11)

## 2017-11-03 PROCEDURE — 36415 COLL VENOUS BLD VENIPUNCTURE: CPT | Performed by: FAMILY MEDICINE

## 2017-11-03 PROCEDURE — 25000128 H RX IP 250 OP 636: Performed by: PHYSICIAN ASSISTANT

## 2017-11-03 PROCEDURE — 25000125 ZZHC RX 250: Performed by: PHYSICIAN ASSISTANT

## 2017-11-03 PROCEDURE — 25000128 H RX IP 250 OP 636: Performed by: FAMILY MEDICINE

## 2017-11-03 PROCEDURE — 99233 SBSQ HOSP IP/OBS HIGH 50: CPT | Performed by: FAMILY MEDICINE

## 2017-11-03 PROCEDURE — 80053 COMPREHEN METABOLIC PANEL: CPT | Performed by: FAMILY MEDICINE

## 2017-11-03 PROCEDURE — 85025 COMPLETE CBC W/AUTO DIFF WBC: CPT | Performed by: FAMILY MEDICINE

## 2017-11-03 PROCEDURE — S0028 INJECTION, FAMOTIDINE, 20 MG: HCPCS | Performed by: FAMILY MEDICINE

## 2017-11-03 PROCEDURE — 83690 ASSAY OF LIPASE: CPT | Performed by: FAMILY MEDICINE

## 2017-11-03 PROCEDURE — 25000125 ZZHC RX 250: Performed by: FAMILY MEDICINE

## 2017-11-03 PROCEDURE — 12000010 ZZH R&B MS INTERMEDIATE OVERFLOW

## 2017-11-03 RX ORDER — NALOXONE HYDROCHLORIDE 0.4 MG/ML
.1-.4 INJECTION, SOLUTION INTRAMUSCULAR; INTRAVENOUS; SUBCUTANEOUS
Status: DISCONTINUED | OUTPATIENT
Start: 2017-11-03 | End: 2017-11-08 | Stop reason: HOSPADM

## 2017-11-03 RX ORDER — SODIUM CHLORIDE, SODIUM LACTATE, POTASSIUM CHLORIDE, CALCIUM CHLORIDE 600; 310; 30; 20 MG/100ML; MG/100ML; MG/100ML; MG/100ML
INJECTION, SOLUTION INTRAVENOUS CONTINUOUS
Status: DISCONTINUED | OUTPATIENT
Start: 2017-11-03 | End: 2017-11-05

## 2017-11-03 RX ADMIN — MORPHINE SULFATE 4 MG: 2 INJECTION, SOLUTION INTRAMUSCULAR; INTRAVENOUS at 00:16

## 2017-11-03 RX ADMIN — SODIUM CHLORIDE, POTASSIUM CHLORIDE, SODIUM LACTATE AND CALCIUM CHLORIDE: 600; 310; 30; 20 INJECTION, SOLUTION INTRAVENOUS at 17:15

## 2017-11-03 RX ADMIN — ENOXAPARIN SODIUM 40 MG: 40 INJECTION SUBCUTANEOUS at 21:04

## 2017-11-03 RX ADMIN — FOLIC ACID: 5 INJECTION, SOLUTION INTRAMUSCULAR; INTRAVENOUS; SUBCUTANEOUS at 23:38

## 2017-11-03 RX ADMIN — MORPHINE SULFATE 4 MG: 2 INJECTION, SOLUTION INTRAMUSCULAR; INTRAVENOUS at 21:05

## 2017-11-03 RX ADMIN — SODIUM CHLORIDE, POTASSIUM CHLORIDE, SODIUM LACTATE AND CALCIUM CHLORIDE: 600; 310; 30; 20 INJECTION, SOLUTION INTRAVENOUS at 05:07

## 2017-11-03 RX ADMIN — MORPHINE SULFATE 4 MG: 2 INJECTION, SOLUTION INTRAMUSCULAR; INTRAVENOUS at 04:04

## 2017-11-03 RX ADMIN — SODIUM CHLORIDE, POTASSIUM CHLORIDE, SODIUM LACTATE AND CALCIUM CHLORIDE: 600; 310; 30; 20 INJECTION, SOLUTION INTRAVENOUS at 23:39

## 2017-11-03 RX ADMIN — MORPHINE SULFATE 4 MG: 2 INJECTION, SOLUTION INTRAMUSCULAR; INTRAVENOUS at 10:21

## 2017-11-03 RX ADMIN — FAMOTIDINE 20 MG: 10 INJECTION, SOLUTION INTRAVENOUS at 08:42

## 2017-11-03 RX ADMIN — MORPHINE SULFATE 4 MG: 2 INJECTION, SOLUTION INTRAMUSCULAR; INTRAVENOUS at 15:56

## 2017-11-03 RX ADMIN — MORPHINE SULFATE 4 MG: 2 INJECTION, SOLUTION INTRAMUSCULAR; INTRAVENOUS at 01:58

## 2017-11-03 RX ADMIN — FOLIC ACID: 5 INJECTION, SOLUTION INTRAMUSCULAR; INTRAVENOUS; SUBCUTANEOUS at 00:16

## 2017-11-03 RX ADMIN — MORPHINE SULFATE 4 MG: 2 INJECTION, SOLUTION INTRAMUSCULAR; INTRAVENOUS at 06:18

## 2017-11-03 RX ADMIN — MORPHINE SULFATE 4 MG: 2 INJECTION, SOLUTION INTRAMUSCULAR; INTRAVENOUS at 18:32

## 2017-11-03 RX ADMIN — MORPHINE SULFATE 4 MG: 2 INJECTION, SOLUTION INTRAMUSCULAR; INTRAVENOUS at 13:58

## 2017-11-03 RX ADMIN — MORPHINE SULFATE 4 MG: 2 INJECTION, SOLUTION INTRAMUSCULAR; INTRAVENOUS at 08:00

## 2017-11-03 RX ADMIN — FAMOTIDINE 20 MG: 10 INJECTION, SOLUTION INTRAVENOUS at 21:57

## 2017-11-03 ASSESSMENT — PAIN DESCRIPTION - DESCRIPTORS: DESCRIPTORS: CONSTANT

## 2017-11-03 NOTE — PLAN OF CARE
Problem: Pancreatitis, Acute/Chronic (Adult)  Goal: Signs and Symptoms of Listed Potential Problems Will be Absent, Minimized or Managed (Pancreatitis, Acute/Chronic)  Signs and symptoms of listed potential problems will be absent, minimized or managed by discharge/transition of care (reference Pancreatitis, Acute/Chronic (Adult) CPG).   Pain continue to be a problem for this patient but she does respond to Morphine every 4 hours.She has the most difficulty getting comfortable in the bed. Spouse at bedside.Continue to assess.

## 2017-11-03 NOTE — DISCHARGE INSTRUCTIONS
Behavioral/Mental Health Resources  [place], [place], [place], [place], [place], [place], [place], [place] and [facility]    **Patient should check with their health insurance to identify providers in network**    Crisis Lines:   -[place]: [place] Crisis Connection: [phone]) 169-4053/[phone]   -[place]: [phone]    -[place]/ [place]/ [place]/ [place]/ [place]: [phone]   -[facility]: [facility] Crisis: [phone]   -[facility]: [facility] Crisis Team [phone] or [phone]     Call the National Suicide Prevention Lifeline at 6-504-990-LTIW (2654) to be connected to a counselor at a crisis center in your area if you, a family member, or friend are experiencing   thoughts of suicide. The call is FREE, confidential, and always available.       National [place] on Mental Illness of [place] ([facility]) provides support groups and educational programs. Visit www.namihelps.org or call the [facility] Helpline at 1-325.173.5191  Or [phone] for further information.       Crisis Mobile Serivces:   -[place]: [employer] [phone]   -[place]/ [place]/ [place]/ [place]/ [place]: [phone]   -[facility]: [facility] [phone]   -[facility]: [phone] or [phone]    Chemical Dependency Detox:  - [place] [facility]:  [phone] - can ask for other recommendations  - [facility]/[place]([facility]):  [phone]  - [facility]: [phone] [place]  - [facility] ([facility]):  [phone]  - [facility] : [phone] [facility]  - [facility]):  [phone]  - [place] ([facility]):  [phone]    Chemical Dependency Assessment:   - [place] [facility]: [phone]   - Behavioral Health Providers, can help find a provider near you:  [phone]  - No insurance- call county of residence and ask about applying for a Rule 25    Counseling/psychotherapy:  - Associated  clinic of psychology - [place],/[facility]/ [facility]/[place] /other locations: [phone]  - Behavioral Healthcare Providers- Can help find a provider near you:  [phone]  - Behavior Health Services-[place]:  [phone]  - [facility]- [place]:  [phone]  - [facility]- [facility]/[place]:  [phone]  - [facility]-[place]: [phone]  - [facility]- [place]/[facility]/other locations:  [phone]  - [facility]- [facility]/[place]:  [phone]  - [facility] - [facility]:  [phone]  - [facility] - [place]:  [phone]  - [facility] - [place]- [place]-based in [place]:  [phone]  - [phone]  - [facility]- [place]: [phone]  - [facility]- [facility]:  [phone]  - [facility]- [place] [phone]  - [facility] counseling located in [place] (not affiliated with [place]): [phone]  - [facility]- [place]:  [phone]/[place]: [phone] and other locations.  - [facility]- [place]: [phone]  - [facility] Recovery- [place]:  [phone]  - [facility]- [facility]/[place]: [phone]  - Walk in counseling center (Free counseling services)- [facility]: [phone]     Psychiatry/ Mental Health Medication management:  - Associated clinic of psychology- [place],/[facility]/ [place]/ multiple locations: [phone]  - Behavioral Healthcare Providers- Can help find a provider near you, if you have preferred one or [facility] they can identify who is in network and assist with scheduling an appointment:  [phone]  - State mental health facility: [place]/[facility] locations : [phone]  [facility]  Counseling Centers - Dr Lobito Bridges and other associates. Collaborative model  with PCP involvement:  [phone] (requires PCP referral specifically)  - [facility]- [place]:  [phone]  - [facility]: [place]:   [phone] (Requires individual to be engaged in counseling)  - [facility]- [place]: [phone] [place]/[facility]-[place]:  [phone]/[place]:  [phone]/ [place]:  [phone]  - Psychiatric Recovery- [place]:   [phone]  - [facility]- [place], -[phone]  - [facility] Psychiatry - Medical students who rotate yearly:  [phone]    County Numbers  - [facility]: [phone]  - [facility]: [phone]  - [facility]: [phone]  - [facility] County : [phone]  - [place]: [phone]  - [facility]: [phone]  - [place]: [phone]  - [facility]: [phone]  - [facility]: [phone]  - [facility]: [phone]    **Please note that this list does not include all agencies that provide services**    Care Transitions Team at [facility] [phone]     The [facility] will call you with a follow up appointment next week sometime if you need to call them call [phone] press 2

## 2017-11-03 NOTE — PROGRESS NOTES
Patient up to restroom, still having bouts of abdominal pain. Morphine 4m every 2 hours seems to do well but pain returns in 2 hours. No nausea reported.

## 2017-11-03 NOTE — PROGRESS NOTES
[facility]           Assessment and Plan:     Kitty Bangura is a 56 year old female with no significant PMH who now presents with abdominal pain.          Acute pancreatitis  11/1/17 -- Presented with hours of right sided abdominal pain, nausea, vomiting and 1 episode of diarrhea.  Lipase 54154.  Received 3L of LR in ED. Suspect alcoholic.    - NPO except for medications, continue IVF at 250cc/hr of LR, pain management with IV dilaudid     11/2/2017 -- improving based on symptoms and labs, but still having significant pain.  Continue NPO on dilaudid today, consider starting clears tomorrow if pain doing well.    11/3/2017 -- symptoms improved a bit again today but still needing frequent morphine - will try transitioning to morphine PCA today.  Lipase stable, exam stable, WBC as below.  Continue NPO.      Alcohol Dependence  11/1/17 -- Reports drinking 2-3 glasses of wine daily  On admission, does not appear to be going through withdrawals  - CIWA protocol in place  - thiamine, folic acid, multivitamin, magnesium/phosphorus therapy initiated. (IV given NPO)  11/3/2017 -- CIWAs good so far.       Pancreatic mass  11/1/17 -- Discussed with Dr. Sanches of the [facility] (GI).  No acute intervention of this mass.  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    - will need close follow up with outpatient GI for ongoing evaluation of new-found mass  11/2/2017 -- plan for outpatient follow-up with GI on discharge.    11/3/2017 -- working on scheduling follow-up appointment today.        Leukocytosis with elevated lactic - consistent with SIRS from pancreatitis as above  11/1/17 -- WBC 23.2 on arrival.  Significant stranding seen on CT. Per Dr. Sanches (see above), appears aggressive and suggests that rapid administration of LR would be prudent to avoid/derail the development of necrosis.  Given 3L of LR.  11/2/2017 -- clinical picture improving, WBC  "down to 12 from 23 on admission without antibiotics, procalcitonin low risk for systemic infection, afebrile, lactic elevated yesterday but normalized with continuing LR at 250/h overnight.  Overall consistent with improving SIRS due to pancreatitis as above, nothing for acute bacterial infection at present.  Continue LR at 250/h for now, likely slow this later today or tomorrow.    11/3/2017 -- WBC down to 12 yesterday, now back to 17, afebrile, symptoms improving slowly with no new symptoms.  Doubt acute infection but unusual - will recheck CBC now - if WBC back to > 20 will consider repeating UA, checking chest x-ray and blood cultures and starting empirically on levaquin/flagyl in case she is developing some infected necrosis.  Consider repeat CT abdomen tomorrow if infectious or overall picture is worsening.      Fatty liver  11/1/17 -- Seen on US.  Follow-up with primary care provider as outpatient and with GI as above.       DVT Prophylaxis: Lovenox for DVT, ranitidine IV while NPO.        Code Status: Full Code     Lines  PIV    Disposition  Slow improvement.  Anticipate at least 3 more days inpatient.              Interval History:   Feels like she's slowly improving.  Needing morphine still on a regular basis, but pain much better with this.  No nausea or vomiting.  Abdomen feels mildly bloated today, still feels like she's \"moving things through\" and about to pass gas, but hasn't had much flatus today and no bowel movement.  No nausea or vomiting.  No fever or chills.  No new symptoms.  No dyspnea. No chest pain, pressure or heaviness.  Overall feels a bit better again than yesterday.             Review of Systems:    ROS: 10 point ROS neg other than the symptoms noted above in the HPI.             Medications:   Current active medications and PTA medications reviewed, see medication list for details.            Physical Exam:   Vitals were reviewed  Patient Vitals for the past 24 hrs:   BP Temp Temp src " Pulse Heart Rate Resp SpO2 Weight   17 0700 - 99  F (37.2  C) Oral 95 95 16 93 % -   17 0500 - - - - - - - 93.5 kg (206 lb 2.1 oz)   17 0000 141/89 98.9  F (37.2  C) Oral - 93 16 95 % -   17 2000 (!) 130/91 99.9  F (37.7  C) Oral - 95 16 95 % -   17 1751 - - - - - - 96 % -   17 1600 - - - - - - 96 % -   17 1540 - - - - - - 94 % -   17 1537 - - - - - - (!) 89 % -   17 1520 (!) 141/91 99  F (37.2  C) Oral - 78 16 97 % -   17 1400 - - - - - - 97 % -   17 1300 - - - - - - 96 % -   17 1200 (!) 148/92 98.2  F (36.8  C) Oral - - 20 91 % -       Temperatures:  Current - Temp: 99  F (37.2  C); Max - Temp  Av  F (37.2  C)  Min: 98.2  F (36.8  C)  Max: 99.9  F (37.7  C)  Respiration range: Resp  Av.8  Min: 16  Max: 20  Pulse range: Pulse  Av  Min: 95  Max: 95  Blood pressure range: Systolic (24hrs), Av , Min:130 , Max:148   ; Diastolic (24hrs), Av, Min:89, Max:92    Pulse oximetry range: SpO2  Av.5 %  Min: 89 %  Max: 97 %  I/O last 3 completed shifts:  In: 3551.66 [I.V.:3551.66]  Out: 665 [Urine:665]    Intake/Output Summary (Last 24 hours) at 17 1159  Last data filed at 17 0700   Gross per 24 hour   Intake          3301.66 ml   Output              665 ml   Net          2636.66 ml     EXAM:  General: awake and alert, NAD, oriented x 3  Head: normocephalic  Neck: unremarkable, no lymphadenopathy   HEENT: oropharynx pink and moist    Heart: Regular rate and rhythm, no murmurs, rubs, or gallops  Lungs: clear to auscultation bilaterally with good air movement throughout  Abdomen: mildly distended but still soft, bowel sounds present throughout and unremarkable, tender mostly in upper mid and upper left abdomen, no rebound or guarding, perhaps slightly less tender than yesterday but hard to gauge, no masses.  No right upper quadrant tenderness.    Extremities: no edema in lower extremities   Skin unremarkable.                Data:     Results for orders placed or performed during the hospital encounter of 11/01/17 (from the past 24 hour(s))   CBC with platelets differential   Result Value Ref Range    WBC 17.2 (H) 4.0 - 11.0 10e9/L    RBC Count 3.87 3.8 - 5.2 10e12/L    Hemoglobin 12.5 11.7 - 15.7 g/dL    Hematocrit 38.2 35.0 - 47.0 %    MCV 99 78 - 100 fl    MCH 32.3 26.5 - 33.0 pg    MCHC 32.7 31.5 - 36.5 g/dL    RDW 12.3 10.0 - 15.0 %    Platelet Count 204 150 - 450 10e9/L    Diff Method Automated Method     % Neutrophils 81.1 %    % Lymphocytes 8.0 %    % Monocytes 10.4 %    % Eosinophils 0.0 %    % Basophils 0.1 %    % Immature Granulocytes 0.4 %    Absolute Neutrophil 14.0 (H) 1.6 - 8.3 10e9/L    Absolute Lymphocytes 1.4 0.8 - 5.3 10e9/L    Absolute Monocytes 1.8 (H) 0.0 - 1.3 10e9/L    Absolute Eosinophils 0.0 0.0 - 0.7 10e9/L    Absolute Basophils 0.0 0.0 - 0.2 10e9/L    Abs Immature Granulocytes 0.1 0 - 0.4 10e9/L   Comprehensive metabolic panel   Result Value Ref Range    Sodium 139 133 - 144 mmol/L    Potassium 4.2 3.4 - 5.3 mmol/L    Chloride 108 94 - 109 mmol/L    Carbon Dioxide 26 20 - 32 mmol/L    Anion Gap 5 3 - 14 mmol/L    Glucose 162 (H) 70 - 99 mg/dL    Urea Nitrogen 19 7 - 30 mg/dL    Creatinine 0.81 0.52 - 1.04 mg/dL    GFR Estimate 73 >60 mL/min/1.7m2    GFR Estimate If Black 88 >60 mL/min/1.7m2    Calcium 7.6 (L) 8.5 - 10.1 mg/dL    Bilirubin Total 1.0 0.2 - 1.3 mg/dL    Albumin 2.6 (L) 3.4 - 5.0 g/dL    Protein Total 5.6 (L) 6.8 - 8.8 g/dL    Alkaline Phosphatase 47 40 - 150 U/L    ALT 23 0 - 50 U/L    AST 57 (H) 0 - 45 U/L   Lipase   Result Value Ref Range    Lipase 4972 (H) 73 - 393 U/L           Attestation:  I have reviewed today's vital signs, notes, medications, labs and imaging.  Amount of time performed on this daily note: 35 minutes.     Stanley Melgoza MD, MD

## 2017-11-04 ENCOUNTER — APPOINTMENT (OUTPATIENT)
Dept: ULTRASOUND IMAGING | Facility: CLINIC | Age: 57
DRG: 439 | End: 2017-11-04
Attending: FAMILY MEDICINE

## 2017-11-04 LAB
ALBUMIN SERPL-MCNC: 2.2 G/DL (ref 3.4–5)
ALBUMIN UR-MCNC: 100 MG/DL
ALP SERPL-CCNC: 55 U/L (ref 40–150)
ALT SERPL W P-5'-P-CCNC: 24 U/L (ref 0–50)
ANION GAP SERPL CALCULATED.3IONS-SCNC: 6 MMOL/L (ref 3–14)
APPEARANCE UR: ABNORMAL
AST SERPL W P-5'-P-CCNC: 66 U/L (ref 0–45)
BASOPHILS # BLD AUTO: 0 10E9/L (ref 0–0.2)
BASOPHILS NFR BLD AUTO: 0.1 %
BILIRUB DIRECT SERPL-MCNC: 1.3 MG/DL (ref 0–0.2)
BILIRUB SERPL-MCNC: 2.5 MG/DL (ref 0.2–1.3)
BILIRUB UR QL STRIP: ABNORMAL
BUN SERPL-MCNC: 12 MG/DL (ref 7–30)
CA-I BLD-MCNC: 4.3 MG/DL (ref 4.4–5.2)
CALCIUM SERPL-MCNC: 7.4 MG/DL (ref 8.5–10.1)
CHLORIDE SERPL-SCNC: 104 MMOL/L (ref 94–109)
CO2 SERPL-SCNC: 28 MMOL/L (ref 20–32)
COLOR UR AUTO: ABNORMAL
CREAT SERPL-MCNC: 0.59 MG/DL (ref 0.52–1.04)
DIFFERENTIAL METHOD BLD: ABNORMAL
EOSINOPHIL # BLD AUTO: 0 10E9/L (ref 0–0.7)
EOSINOPHIL NFR BLD AUTO: 0.1 %
ERYTHROCYTE [DISTWIDTH] IN BLOOD BY AUTOMATED COUNT: 12.3 % (ref 10–15)
GFR SERPL CREATININE-BSD FRML MDRD: >90 ML/MIN/1.7M2
GLUCOSE SERPL-MCNC: 111 MG/DL (ref 70–99)
GLUCOSE UR STRIP-MCNC: NEGATIVE MG/DL
HCT VFR BLD AUTO: 34.8 % (ref 35–47)
HGB BLD-MCNC: 11.3 G/DL (ref 11.7–15.7)
HGB UR QL STRIP: ABNORMAL
IMM GRANULOCYTES # BLD: 0.1 10E9/L (ref 0–0.4)
IMM GRANULOCYTES NFR BLD: 0.4 %
KETONES UR STRIP-MCNC: >150 MG/DL
LACTATE BLD-SCNC: 1 MMOL/L (ref 0.7–2)
LEUKOCYTE ESTERASE UR QL STRIP: ABNORMAL
LIPASE SERPL-CCNC: 1943 U/L (ref 73–393)
LYMPHOCYTES # BLD AUTO: 1.2 10E9/L (ref 0.8–5.3)
LYMPHOCYTES NFR BLD AUTO: 7.1 %
MCH RBC QN AUTO: 32.4 PG (ref 26.5–33)
MCHC RBC AUTO-ENTMCNC: 32.5 G/DL (ref 31.5–36.5)
MCV RBC AUTO: 100 FL (ref 78–100)
MONOCYTES # BLD AUTO: 1.7 10E9/L (ref 0–1.3)
MONOCYTES NFR BLD AUTO: 10.2 %
MUCOUS THREADS #/AREA URNS LPF: PRESENT /LPF
NEUTROPHILS # BLD AUTO: 13.6 10E9/L (ref 1.6–8.3)
NEUTROPHILS NFR BLD AUTO: 82.1 %
NITRATE UR QL: NEGATIVE
PH UR STRIP: 6.5 PH (ref 5–7)
PLATELET # BLD AUTO: 157 10E9/L (ref 150–450)
POTASSIUM SERPL-SCNC: 3.7 MMOL/L (ref 3.4–5.3)
PROCALCITONIN SERPL-MCNC: 0.88 NG/ML
PROT SERPL-MCNC: 5.5 G/DL (ref 6.8–8.8)
RBC # BLD AUTO: 3.49 10E12/L (ref 3.8–5.2)
RBC #/AREA URNS AUTO: 29 /HPF (ref 0–2)
SODIUM SERPL-SCNC: 138 MMOL/L (ref 133–144)
SOURCE: ABNORMAL
SP GR UR STRIP: 1.02 (ref 1–1.03)
SQUAMOUS #/AREA URNS AUTO: 6 /HPF (ref 0–1)
TRANS CELLS #/AREA URNS HPF: 1 /HPF (ref 0–1)
UROBILINOGEN UR STRIP-MCNC: NORMAL MG/DL (ref 0–2)
WBC # BLD AUTO: 16.5 10E9/L (ref 4–11)
WBC #/AREA URNS AUTO: 13 /HPF (ref 0–2)

## 2017-11-04 PROCEDURE — 76700 US EXAM ABDOM COMPLETE: CPT

## 2017-11-04 PROCEDURE — 25000128 H RX IP 250 OP 636: Performed by: PHYSICIAN ASSISTANT

## 2017-11-04 PROCEDURE — 80053 COMPREHEN METABOLIC PANEL: CPT | Performed by: FAMILY MEDICINE

## 2017-11-04 PROCEDURE — 87040 BLOOD CULTURE FOR BACTERIA: CPT | Performed by: FAMILY MEDICINE

## 2017-11-04 PROCEDURE — 83690 ASSAY OF LIPASE: CPT | Performed by: FAMILY MEDICINE

## 2017-11-04 PROCEDURE — 87086 URINE CULTURE/COLONY COUNT: CPT | Performed by: INTERNAL MEDICINE

## 2017-11-04 PROCEDURE — 12000010 ZZH R&B MS INTERMEDIATE OVERFLOW

## 2017-11-04 PROCEDURE — 87181 SC STD AGAR DILUTION PER AGT: CPT | Performed by: FAMILY MEDICINE

## 2017-11-04 PROCEDURE — 25000125 ZZHC RX 250: Performed by: PHYSICIAN ASSISTANT

## 2017-11-04 PROCEDURE — 36415 COLL VENOUS BLD VENIPUNCTURE: CPT | Performed by: FAMILY MEDICINE

## 2017-11-04 PROCEDURE — 83605 ASSAY OF LACTIC ACID: CPT | Performed by: FAMILY MEDICINE

## 2017-11-04 PROCEDURE — 25000128 H RX IP 250 OP 636: Performed by: INTERNAL MEDICINE

## 2017-11-04 PROCEDURE — 85025 COMPLETE CBC W/AUTO DIFF WBC: CPT | Performed by: FAMILY MEDICINE

## 2017-11-04 PROCEDURE — 25000128 H RX IP 250 OP 636: Performed by: FAMILY MEDICINE

## 2017-11-04 PROCEDURE — S0028 INJECTION, FAMOTIDINE, 20 MG: HCPCS | Performed by: FAMILY MEDICINE

## 2017-11-04 PROCEDURE — 99233 SBSQ HOSP IP/OBS HIGH 50: CPT | Performed by: FAMILY MEDICINE

## 2017-11-04 PROCEDURE — 82248 BILIRUBIN DIRECT: CPT | Performed by: FAMILY MEDICINE

## 2017-11-04 PROCEDURE — 87800 DETECT AGNT MULT DNA DIREC: CPT | Performed by: FAMILY MEDICINE

## 2017-11-04 PROCEDURE — 25000125 ZZHC RX 250: Performed by: FAMILY MEDICINE

## 2017-11-04 PROCEDURE — 84145 PROCALCITONIN (PCT): CPT | Performed by: FAMILY MEDICINE

## 2017-11-04 PROCEDURE — 82330 ASSAY OF CALCIUM: CPT | Performed by: FAMILY MEDICINE

## 2017-11-04 PROCEDURE — 81001 URINALYSIS AUTO W/SCOPE: CPT | Performed by: FAMILY MEDICINE

## 2017-11-04 PROCEDURE — 87076 CULTURE ANAEROBE IDENT EACH: CPT | Performed by: FAMILY MEDICINE

## 2017-11-04 RX ORDER — LEVOFLOXACIN 5 MG/ML
500 INJECTION, SOLUTION INTRAVENOUS EVERY 24 HOURS
Status: DISCONTINUED | OUTPATIENT
Start: 2017-11-04 | End: 2017-11-07

## 2017-11-04 RX ORDER — HYDROMORPHONE HYDROCHLORIDE 1 MG/ML
.5-1 INJECTION, SOLUTION INTRAMUSCULAR; INTRAVENOUS; SUBCUTANEOUS
Status: DISCONTINUED | OUTPATIENT
Start: 2017-11-04 | End: 2017-11-06

## 2017-11-04 RX ADMIN — MORPHINE SULFATE 4 MG: 2 INJECTION, SOLUTION INTRAMUSCULAR; INTRAVENOUS at 14:36

## 2017-11-04 RX ADMIN — HYDROMORPHONE HYDROCHLORIDE 0.5 MG: 1 INJECTION, SOLUTION INTRAMUSCULAR; INTRAVENOUS; SUBCUTANEOUS at 09:05

## 2017-11-04 RX ADMIN — FAMOTIDINE 20 MG: 10 INJECTION, SOLUTION INTRAVENOUS at 09:03

## 2017-11-04 RX ADMIN — FAMOTIDINE 20 MG: 10 INJECTION, SOLUTION INTRAVENOUS at 20:45

## 2017-11-04 RX ADMIN — LEVOFLOXACIN 500 MG: 5 INJECTION, SOLUTION INTRAVENOUS at 17:09

## 2017-11-04 RX ADMIN — MORPHINE SULFATE 4 MG: 2 INJECTION, SOLUTION INTRAMUSCULAR; INTRAVENOUS at 20:45

## 2017-11-04 RX ADMIN — FOLIC ACID: 5 INJECTION, SOLUTION INTRAMUSCULAR; INTRAVENOUS; SUBCUTANEOUS at 21:27

## 2017-11-04 RX ADMIN — MORPHINE SULFATE 4 MG: 2 INJECTION, SOLUTION INTRAMUSCULAR; INTRAVENOUS at 06:32

## 2017-11-04 RX ADMIN — HYDROMORPHONE HYDROCHLORIDE 0.5 MG: 1 INJECTION, SOLUTION INTRAMUSCULAR; INTRAVENOUS; SUBCUTANEOUS at 17:49

## 2017-11-04 RX ADMIN — METRONIDAZOLE 500 MG: 500 INJECTION, SOLUTION INTRAVENOUS at 21:26

## 2017-11-04 RX ADMIN — MORPHINE SULFATE 4 MG: 2 INJECTION, SOLUTION INTRAMUSCULAR; INTRAVENOUS at 23:38

## 2017-11-04 RX ADMIN — METRONIDAZOLE 500 MG: 500 INJECTION, SOLUTION INTRAVENOUS at 16:03

## 2017-11-04 RX ADMIN — ENOXAPARIN SODIUM 40 MG: 40 INJECTION SUBCUTANEOUS at 20:45

## 2017-11-04 RX ADMIN — HYDROMORPHONE HYDROCHLORIDE 0.5 MG: 1 INJECTION, SOLUTION INTRAMUSCULAR; INTRAVENOUS; SUBCUTANEOUS at 11:59

## 2017-11-04 RX ADMIN — MORPHINE SULFATE 4 MG: 2 INJECTION, SOLUTION INTRAMUSCULAR; INTRAVENOUS at 02:12

## 2017-11-04 ASSESSMENT — PAIN DESCRIPTION - DESCRIPTORS
DESCRIPTORS: DISCOMFORT
DESCRIPTORS: DISCOMFORT
DESCRIPTORS: DISCOMFORT;JABBING

## 2017-11-04 NOTE — PLAN OF CARE
Problem: Pancreatitis, Acute/Chronic (Adult)  Goal: Signs and Symptoms of Listed Potential Problems Will be Absent, Minimized or Managed (Pancreatitis, Acute/Chronic)  Signs and symptoms of listed potential problems will be absent, minimized or managed by discharge/transition of care (reference Pancreatitis, Acute/Chronic (Adult) CPG).   Outcome: Declining  Overnight having a lot of gas pains, did rock in rocker, up in recliner also and turned from side to side, walked to bathroom x4 for small voiding of urine 150 to 225 cc each time.  Walked in halls also.  Medicated with Morphine X 2 only thinking may be slowing bowels down more.  Was able to pass flatus in good amounts remains NPO with ice chips and swabs to keep mouth moist.  Plan of care continues but not progressing toward goal to have BM, last BM on the 1st.  Did not try Dilaudid although did offer to patient, this may be better for this type of pain.  Continue to monitor.  ,

## 2017-11-04 NOTE — PROGRESS NOTES
Pt reports feeling a little better today. Still having right abdominal pain, passing flatus. Urine is dark burnt orange/kendra color. Lipase coming down, IVF continues. Pt is being medicated with Morphine and Ativan which gives temporary relief. O2 saturation drops to mid 80's while asleep on room air.  VSS.

## 2017-11-04 NOTE — PLAN OF CARE
Problem: Patient Care Overview  Goal: Plan of Care/Patient Progress Review  Patient A/O x4. Ambulated with SBA in hallway; dyspnea and pt feels this is due to her pain. Scheduled meds and prn pain meds given per MAR. Bowel sounds low audible; pt states unable to pass gas but feels the need.   Temp: 99.3  F (37.4  C) Temp src: Oral BP: 138/66 Pulse: 97 Heart Rate: 95 Resp: 18 SpO2: 96 % O2 Device: Nasal cannula Oxygen Delivery: 1 LPM     Not on O2 upon arrival; SAT in mid to upper 80's, put on 1LPM. O2 in mid 90's.      Continue to monitor and implement poc.

## 2017-11-04 NOTE — PLAN OF CARE
Problem: Patient Care Overview  Goal: Plan of Care/Patient Progress Review  Outcome: No Change  Patient continues to be NPO. Using mouth swabs at her bedside. Receiving Morphine about every 2 hours for abdominal pain. Abdomin distended but softer then yesterday per patient. Ambulated in hallway and up to chair.

## 2017-11-04 NOTE — PROGRESS NOTES
[facility] Hospitalist Progress Note           Assessment and Plan:     Kitty Bangura is a 56 year old female with no significant PMH who now presents with abdominal pain.          Acute pancreatitis  11/1/17 -- Presented with hours of right sided abdominal pain, nausea, vomiting and 1 episode of diarrhea.  Lipase 49119.  Received 3L of LR in ED. Suspect alcoholic.    - NPO except for medications, continue IVF at 250cc/hr of LR, pain management with IV dilaudid     11/2/2017 -- improving based on symptoms and labs, but still having significant pain.  Continue NPO on dilaudid today, consider starting clears tomorrow if pain doing well.    11/3/2017 -- symptoms improved a bit again today but still needing frequent morphine - will try transitioning to morphine PCA today.  Lipase stable, exam stable, WBC as below.  Continue NPO.    11/4/2017 -- symptomatically improving, lipase down.  However, bili is up and WBC still up albeit improving again as below.  Continue NPO.  Now having good urine output. Lactic drawn by sepsis BPA - if normal will slow IVF to 150/h.      Elevated bili  11/4/2017 -- nothing that looks like hemolysis, no worsening pain or clear right upper quadrant pain.  Liver enzymes normal other than faintly elevated AST.  Doubt cholecystitis and less so cholangitis (see discussion below).   Will check abdominal US, follow bili.       Alcohol Dependence  11/1/17 -- Reports drinking 2-3 glasses of wine daily  On admission, does not appear to be going through withdrawals  - CIWA protocol in place  - thiamine, folic acid, multivitamin, magnesium/phosphorus therapy initiated. (IV given NPO)  11/4/2017 -- CIWAs good so far.  Likely stop if doing well tomorrow.       Pancreatic mass  11/1/17 -- Discussed with Dr. Sanches of the [facility] (GI).  No acute intervention of this mass.  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    - will need close follow up with  outpatient GI for ongoing evaluation of new-found mass  11/2/2017 -- plan for outpatient follow-up with GI on discharge.    11/4/2017 -- unable to get appointment scheduled Fri, will need to do on Monday.        Leukocytosis with elevated lactic - consistent with SIRS from pancreatitis as above  11/1/17 -- WBC 23.2 on arrival.  Significant stranding seen on CT. Per Dr. Sanches (see above), appears aggressive and suggests that rapid administration of LR would be prudent to avoid/derail the development of necrosis.  Given 3L of LR.  11/2/2017 -- clinical picture improving, WBC down to 12 from 23 on admission without antibiotics, procalcitonin low risk for systemic infection, afebrile, lactic elevated yesterday but normalized with continuing LR at 250/h overnight.  Overall consistent with improving SIRS due to pancreatitis as above, nothing for acute bacterial infection at present.  Continue LR at 250/h for now, likely slow this later today or tomorrow.    11/3/2017 -- WBC down to 12 yesterday, now back to 17, afebrile, symptoms improving slowly with no new symptoms.  Doubt acute infection but unusual - will recheck CBC now - if WBC back to > 20 will consider repeating UA, checking chest x-ray and blood cultures and starting empirically on levaquin/flagyl in case she is developing some infected necrosis.    11/4/2017 -- WBC improving again today but still up.  With bili up and picture showing improvement but slowly I think caution is appropriate here although I don't see clear evidence of any acute infection - will sent blood cultures, start empirically on levaquin/flagyl for coverage of pancreas and biliary tree, and check abdominal US as above.  Lactic pending, if up will likely give small fluid bolus, but overall patient appears adequately volume resuscitated.  If picture worsening tomorrow would consider repeat CT to assess pancreas.  Adding procalcitonin.      Fatty liver  11/1/17 -- Seen on US.  Follow-up with  primary care provider as outpatient and with GI as above.        DVT Prophylaxis: Lovenox for DVT, ranitidine IV while NPO.        Code Status: Full Code    Disposition  Improving but slowly, will need at least 2 and likely at least 3 more days inpatient.              Interval History:   Symptomatically feels improved.  Has been up and walking today.  Pain better, but still needing morphine albiet not as often.  No fever, sometimes feels chilled but this is baseline.  No new or worsening pain and pain remains in upper mid abdomen mostly, no pain clearly in right upper quadrant.  Pain currently 4-5/10.  No nausea or vomiting.  Passing gas well today, no bowel movement.             Review of Systems:    ROS: 10 point ROS neg other than the symptoms noted above in the HPI.             Medications:   Current active medications and PTA medications reviewed, see medication list for details.            Physical Exam:   Vitals were reviewed  Patient Vitals for the past 24 hrs:   BP Temp Temp src Pulse Resp SpO2 Weight   17 1330 141/81 - - 101 16 - -   17 1300 - 98.6  F (37  C) - - - - -   17 0900 - - - - - 98 % -   17 0820 (!) 155/99 99.3  F (37.4  C) Oral 88 18 98 % -   17 0417 - - - - - 96 % 96.2 kg (212 lb 1.3 oz)   17 0414 132/84 99  F (37.2  C) Oral 80 20 - -   17 2344 - - - - - 93 % -   17 2343 - - - - - (!) 87 % -   17 2341 138/88 99.3  F (37.4  C) Oral 80 20 - -   17 1649 138/66 99.3  F (37.4  C) Oral 97 18 96 % -       Temperatures:  Current - Temp: 98.6  F (37  C); Max - Temp  Av.1  F (37.3  C)  Min: 98.6  F (37  C)  Max: 99.3  F (37.4  C)  Respiration range: Resp  Av.4  Min: 16  Max: 20  Pulse range: Pulse  Av.2  Min: 80  Max: 101  Blood pressure range: Systolic (24hrs), Av , Min:132 , Max:155   ; Diastolic (24hrs), Av, Min:66, Max:99    Pulse oximetry range: SpO2  Av.7 %  Min: 87 %  Max: 98 %  I/O last 3 completed  shifts:  In: 5310.42 [I.V.:5310.42]  Out: 1000 [Urine:1000]    Intake/Output Summary (Last 24 hours) at 11/04/17 1357  Last data filed at 11/04/17 1031   Gross per 24 hour   Intake          5610.42 ml   Output             1475 ml   Net          4135.42 ml     EXAM:  General: awake and alert, NAD, oriented x 3  Head: normocephalic  Neck: unremarkable, no lymphadenopathy   HEENT: oropharynx pink and moist    Heart: Regular rate and rhythm, no murmurs, rubs, or gallops  Lungs: clear to auscultation bilaterally with good air movement throughout  Abdomen: soft, tender in upper mid abdomen, some mild tenderness in right upper quadrant with very faintly positive luna's sign but really this is quite minimal - most of her pain is in upper epigastric area with just less pronounced pain down into mid abdomen and left upper quadrant and right upper quadrant equally.  No rebound or guarding. Good bowel sounds throughout.    Extremities: no edema in lower extremities   Skin unremarkable.               Data:     Results for orders placed or performed during the hospital encounter of 11/01/17 (from the past 24 hour(s))   Calcium ionized whole blood   Result Value Ref Range    Calcium Ionized Whole Blood 4.3 (L) 4.4 - 5.2 mg/dL   Comprehensive metabolic panel   Result Value Ref Range    Sodium 138 133 - 144 mmol/L    Potassium 3.7 3.4 - 5.3 mmol/L    Chloride 104 94 - 109 mmol/L    Carbon Dioxide 28 20 - 32 mmol/L    Anion Gap 6 3 - 14 mmol/L    Glucose 111 (H) 70 - 99 mg/dL    Urea Nitrogen 12 7 - 30 mg/dL    Creatinine 0.59 0.52 - 1.04 mg/dL    GFR Estimate >90 >60 mL/min/1.7m2    GFR Estimate If Black >90 >60 mL/min/1.7m2    Calcium 7.4 (L) 8.5 - 10.1 mg/dL    Bilirubin Total 2.5 (H) 0.2 - 1.3 mg/dL    Albumin 2.2 (L) 3.4 - 5.0 g/dL    Protein Total 5.5 (L) 6.8 - 8.8 g/dL    Alkaline Phosphatase 55 40 - 150 U/L    ALT 24 0 - 50 U/L    AST 66 (H) 0 - 45 U/L   Lipase   Result Value Ref Range    Lipase 1943 (H) 73 - 393 U/L   CBC  with platelets differential   Result Value Ref Range    WBC 16.5 (H) 4.0 - 11.0 10e9/L    RBC Count 3.49 (L) 3.8 - 5.2 10e12/L    Hemoglobin 11.3 (L) 11.7 - 15.7 g/dL    Hematocrit 34.8 (L) 35.0 - 47.0 %     78 - 100 fl    MCH 32.4 26.5 - 33.0 pg    MCHC 32.5 31.5 - 36.5 g/dL    RDW 12.3 10.0 - 15.0 %    Platelet Count 157 150 - 450 10e9/L    Diff Method Automated Method     % Neutrophils 82.1 %    % Lymphocytes 7.1 %    % Monocytes 10.2 %    % Eosinophils 0.1 %    % Basophils 0.1 %    % Immature Granulocytes 0.4 %    Absolute Neutrophil 13.6 (H) 1.6 - 8.3 10e9/L    Absolute Lymphocytes 1.2 0.8 - 5.3 10e9/L    Absolute Monocytes 1.7 (H) 0.0 - 1.3 10e9/L    Absolute Eosinophils 0.0 0.0 - 0.7 10e9/L    Absolute Basophils 0.0 0.0 - 0.2 10e9/L    Abs Immature Granulocytes 0.1 0 - 0.4 10e9/L   Bilirubin direct   Result Value Ref Range    Bilirubin Direct 1.3 (H) 0.0 - 0.2 mg/dL           Attestation:  I have reviewed today's vital signs, notes, medications, labs and imaging.  Amount of time performed on this daily note: 45 minutes.     Stanley Melgoza MD, MD

## 2017-11-05 ENCOUNTER — APPOINTMENT (OUTPATIENT)
Dept: CT IMAGING | Facility: CLINIC | Age: 57
DRG: 439 | End: 2017-11-05
Attending: FAMILY MEDICINE

## 2017-11-05 LAB
ALBUMIN SERPL-MCNC: 2 G/DL (ref 3.4–5)
ALBUMIN SERPL-MCNC: 2.1 G/DL (ref 3.4–5)
ALP SERPL-CCNC: 67 U/L (ref 40–150)
ALT SERPL W P-5'-P-CCNC: 26 U/L (ref 0–50)
ANION GAP SERPL CALCULATED.3IONS-SCNC: 8 MMOL/L (ref 3–14)
AST SERPL W P-5'-P-CCNC: 53 U/L (ref 0–45)
BACTERIA SPEC CULT: NORMAL
BASOPHILS # BLD AUTO: 0 10E9/L (ref 0–0.2)
BASOPHILS NFR BLD AUTO: 0.1 %
BILIRUB DIRECT SERPL-MCNC: 2.7 MG/DL (ref 0–0.2)
BILIRUB SERPL-MCNC: 4.1 MG/DL (ref 0.2–1.3)
BUN SERPL-MCNC: 10 MG/DL (ref 7–30)
CALCIUM SERPL-MCNC: 7.6 MG/DL (ref 8.5–10.1)
CHLORIDE SERPL-SCNC: 106 MMOL/L (ref 94–109)
CO2 SERPL-SCNC: 25 MMOL/L (ref 20–32)
CREAT SERPL-MCNC: 0.53 MG/DL (ref 0.52–1.04)
DIFFERENTIAL METHOD BLD: ABNORMAL
EOSINOPHIL # BLD AUTO: 0 10E9/L (ref 0–0.7)
EOSINOPHIL NFR BLD AUTO: 0.1 %
ERYTHROCYTE [DISTWIDTH] IN BLOOD BY AUTOMATED COUNT: 11.9 % (ref 10–15)
GFR SERPL CREATININE-BSD FRML MDRD: >90 ML/MIN/1.7M2
GLUCOSE SERPL-MCNC: 84 MG/DL (ref 70–99)
HCT VFR BLD AUTO: 31.2 % (ref 35–47)
HGB BLD-MCNC: 10.4 G/DL (ref 11.7–15.7)
IMM GRANULOCYTES # BLD: 0.1 10E9/L (ref 0–0.4)
IMM GRANULOCYTES NFR BLD: 0.4 %
LIPASE SERPL-CCNC: 336 U/L (ref 73–393)
LYMPHOCYTES # BLD AUTO: 1.2 10E9/L (ref 0.8–5.3)
LYMPHOCYTES NFR BLD AUTO: 6.8 %
Lab: NORMAL
MAGNESIUM SERPL-MCNC: 2.2 MG/DL (ref 1.6–2.3)
MCH RBC QN AUTO: 32.5 PG (ref 26.5–33)
MCHC RBC AUTO-ENTMCNC: 33.3 G/DL (ref 31.5–36.5)
MCV RBC AUTO: 98 FL (ref 78–100)
MONOCYTES # BLD AUTO: 2.2 10E9/L (ref 0–1.3)
MONOCYTES NFR BLD AUTO: 13 %
NEUTROPHILS # BLD AUTO: 13.5 10E9/L (ref 1.6–8.3)
NEUTROPHILS NFR BLD AUTO: 79.6 %
PHOSPHATE SERPL-MCNC: 1.3 MG/DL (ref 2.5–4.5)
PLATELET # BLD AUTO: 183 10E9/L (ref 150–450)
POTASSIUM SERPL-SCNC: 3.3 MMOL/L (ref 3.4–5.3)
POTASSIUM SERPL-SCNC: 3.4 MMOL/L (ref 3.4–5.3)
PROCALCITONIN SERPL-MCNC: 0.18 NG/ML
PROT SERPL-MCNC: 5.3 G/DL (ref 6.8–8.8)
RBC # BLD AUTO: 3.2 10E12/L (ref 3.8–5.2)
SODIUM SERPL-SCNC: 139 MMOL/L (ref 133–144)
SPECIMEN SOURCE: NORMAL
WBC # BLD AUTO: 17 10E9/L (ref 4–11)

## 2017-11-05 PROCEDURE — 84100 ASSAY OF PHOSPHORUS: CPT | Performed by: FAMILY MEDICINE

## 2017-11-05 PROCEDURE — 74177 CT ABD & PELVIS W/CONTRAST: CPT

## 2017-11-05 PROCEDURE — 25000128 H RX IP 250 OP 636: Performed by: PHYSICIAN ASSISTANT

## 2017-11-05 PROCEDURE — 80076 HEPATIC FUNCTION PANEL: CPT | Performed by: FAMILY MEDICINE

## 2017-11-05 PROCEDURE — 25000132 ZZH RX MED GY IP 250 OP 250 PS 637: Performed by: FAMILY MEDICINE

## 2017-11-05 PROCEDURE — 80048 BASIC METABOLIC PNL TOTAL CA: CPT | Performed by: FAMILY MEDICINE

## 2017-11-05 PROCEDURE — 99233 SBSQ HOSP IP/OBS HIGH 50: CPT | Performed by: FAMILY MEDICINE

## 2017-11-05 PROCEDURE — 25000125 ZZHC RX 250: Performed by: FAMILY MEDICINE

## 2017-11-05 PROCEDURE — 12000010 ZZH R&B MS INTERMEDIATE OVERFLOW

## 2017-11-05 PROCEDURE — 83690 ASSAY OF LIPASE: CPT | Performed by: FAMILY MEDICINE

## 2017-11-05 PROCEDURE — 84145 PROCALCITONIN (PCT): CPT | Performed by: FAMILY MEDICINE

## 2017-11-05 PROCEDURE — 36415 COLL VENOUS BLD VENIPUNCTURE: CPT | Performed by: FAMILY MEDICINE

## 2017-11-05 PROCEDURE — 25000125 ZZHC RX 250: Performed by: PHYSICIAN ASSISTANT

## 2017-11-05 PROCEDURE — 25000128 H RX IP 250 OP 636: Performed by: FAMILY MEDICINE

## 2017-11-05 PROCEDURE — 82040 ASSAY OF SERUM ALBUMIN: CPT | Performed by: FAMILY MEDICINE

## 2017-11-05 PROCEDURE — 83735 ASSAY OF MAGNESIUM: CPT | Performed by: FAMILY MEDICINE

## 2017-11-05 PROCEDURE — 85025 COMPLETE CBC W/AUTO DIFF WBC: CPT | Performed by: FAMILY MEDICINE

## 2017-11-05 PROCEDURE — S0028 INJECTION, FAMOTIDINE, 20 MG: HCPCS | Performed by: FAMILY MEDICINE

## 2017-11-05 PROCEDURE — 84132 ASSAY OF SERUM POTASSIUM: CPT | Performed by: FAMILY MEDICINE

## 2017-11-05 RX ORDER — POTASSIUM CHLORIDE 7.45 MG/ML
10 INJECTION INTRAVENOUS
Status: DISCONTINUED | OUTPATIENT
Start: 2017-11-05 | End: 2017-11-08 | Stop reason: HOSPADM

## 2017-11-05 RX ORDER — POTASSIUM CHLORIDE 1.5 G/1.58G
20-40 POWDER, FOR SOLUTION ORAL
Status: DISCONTINUED | OUTPATIENT
Start: 2017-11-05 | End: 2017-11-08 | Stop reason: HOSPADM

## 2017-11-05 RX ORDER — OXYCODONE HYDROCHLORIDE 5 MG/1
5-10 TABLET ORAL EVERY 4 HOURS PRN
Status: DISCONTINUED | OUTPATIENT
Start: 2017-11-05 | End: 2017-11-06

## 2017-11-05 RX ORDER — LIDOCAINE HYDROCHLORIDE 10 MG/ML
1 INJECTION, SOLUTION EPIDURAL; INFILTRATION; INTRACAUDAL; PERINEURAL ONCE
Status: COMPLETED | OUTPATIENT
Start: 2017-11-05 | End: 2017-11-05

## 2017-11-05 RX ORDER — POTASSIUM CL/LIDO/0.9 % NACL 10MEQ/0.1L
10 INTRAVENOUS SOLUTION, PIGGYBACK (ML) INTRAVENOUS
Status: DISCONTINUED | OUTPATIENT
Start: 2017-11-05 | End: 2017-11-08 | Stop reason: HOSPADM

## 2017-11-05 RX ORDER — POTASSIUM CHLORIDE 1500 MG/1
20-40 TABLET, EXTENDED RELEASE ORAL
Status: DISCONTINUED | OUTPATIENT
Start: 2017-11-05 | End: 2017-11-08 | Stop reason: HOSPADM

## 2017-11-05 RX ORDER — POTASSIUM CHLORIDE 29.8 MG/ML
20 INJECTION INTRAVENOUS
Status: DISCONTINUED | OUTPATIENT
Start: 2017-11-05 | End: 2017-11-05

## 2017-11-05 RX ORDER — FUROSEMIDE 10 MG/ML
20 INJECTION INTRAMUSCULAR; INTRAVENOUS DAILY
Status: DISCONTINUED | OUTPATIENT
Start: 2017-11-05 | End: 2017-11-06

## 2017-11-05 RX ORDER — IOPAMIDOL 755 MG/ML
100 INJECTION, SOLUTION INTRAVASCULAR ONCE
Status: COMPLETED | OUTPATIENT
Start: 2017-11-05 | End: 2017-11-05

## 2017-11-05 RX ADMIN — FAMOTIDINE 20 MG: 10 INJECTION, SOLUTION INTRAVENOUS at 07:55

## 2017-11-05 RX ADMIN — HYDROMORPHONE HYDROCHLORIDE 0.5 MG: 1 INJECTION, SOLUTION INTRAMUSCULAR; INTRAVENOUS; SUBCUTANEOUS at 11:25

## 2017-11-05 RX ADMIN — OXYCODONE HYDROCHLORIDE 10 MG: 5 TABLET ORAL at 21:51

## 2017-11-05 RX ADMIN — ENOXAPARIN SODIUM 40 MG: 40 INJECTION SUBCUTANEOUS at 21:41

## 2017-11-05 RX ADMIN — MORPHINE SULFATE 4 MG: 2 INJECTION, SOLUTION INTRAMUSCULAR; INTRAVENOUS at 01:28

## 2017-11-05 RX ADMIN — IOPAMIDOL 100 ML: 755 INJECTION, SOLUTION INTRAVENOUS at 08:22

## 2017-11-05 RX ADMIN — Medication 10 MEQ: at 11:39

## 2017-11-05 RX ADMIN — LIDOCAINE HYDROCHLORIDE 1 MG: 10 INJECTION, SOLUTION EPIDURAL; INFILTRATION; INTRACAUDAL; PERINEURAL at 10:06

## 2017-11-05 RX ADMIN — LEVOFLOXACIN 500 MG: 5 INJECTION, SOLUTION INTRAVENOUS at 13:43

## 2017-11-05 RX ADMIN — OXYCODONE HYDROCHLORIDE 10 MG: 5 TABLET ORAL at 17:25

## 2017-11-05 RX ADMIN — FUROSEMIDE 20 MG: 10 INJECTION, SOLUTION INTRAVENOUS at 13:50

## 2017-11-05 RX ADMIN — Medication 10 MEQ: at 12:39

## 2017-11-05 RX ADMIN — SODIUM CHLORIDE, POTASSIUM CHLORIDE, SODIUM LACTATE AND CALCIUM CHLORIDE: 600; 310; 30; 20 INJECTION, SOLUTION INTRAVENOUS at 05:34

## 2017-11-05 RX ADMIN — FOLIC ACID: 5 INJECTION, SOLUTION INTRAMUSCULAR; INTRAVENOUS; SUBCUTANEOUS at 23:59

## 2017-11-05 RX ADMIN — METRONIDAZOLE 500 MG: 500 INJECTION, SOLUTION INTRAVENOUS at 16:06

## 2017-11-05 RX ADMIN — MORPHINE SULFATE 4 MG: 2 INJECTION, SOLUTION INTRAMUSCULAR; INTRAVENOUS at 12:47

## 2017-11-05 RX ADMIN — Medication 10 MEQ: at 10:31

## 2017-11-05 RX ADMIN — SODIUM CHLORIDE 64 ML: 9 INJECTION, SOLUTION INTRAVENOUS at 08:22

## 2017-11-05 RX ADMIN — MORPHINE SULFATE 4 MG: 2 INJECTION, SOLUTION INTRAMUSCULAR; INTRAVENOUS at 03:14

## 2017-11-05 RX ADMIN — Medication 10 MEQ: at 09:38

## 2017-11-05 RX ADMIN — METRONIDAZOLE 500 MG: 500 INJECTION, SOLUTION INTRAVENOUS at 21:41

## 2017-11-05 RX ADMIN — OXYCODONE HYDROCHLORIDE 10 MG: 5 TABLET ORAL at 13:24

## 2017-11-05 RX ADMIN — FAMOTIDINE 20 MG: 10 INJECTION, SOLUTION INTRAVENOUS at 21:41

## 2017-11-05 RX ADMIN — POTASSIUM PHOSPHATE, MONOBASIC AND POTASSIUM PHOSPHATE, DIBASIC 20 MMOL: 224; 236 INJECTION, SOLUTION INTRAVENOUS at 13:41

## 2017-11-05 RX ADMIN — HYDROMORPHONE HYDROCHLORIDE 1 MG: 1 INJECTION, SOLUTION INTRAMUSCULAR; INTRAVENOUS; SUBCUTANEOUS at 05:34

## 2017-11-05 RX ADMIN — METRONIDAZOLE 500 MG: 500 INJECTION, SOLUTION INTRAVENOUS at 04:06

## 2017-11-05 RX ADMIN — HYDROMORPHONE HYDROCHLORIDE 0.5 MG: 1 INJECTION, SOLUTION INTRAMUSCULAR; INTRAVENOUS; SUBCUTANEOUS at 07:55

## 2017-11-05 RX ADMIN — METRONIDAZOLE 500 MG: 500 INJECTION, SOLUTION INTRAVENOUS at 09:36

## 2017-11-05 ASSESSMENT — PAIN DESCRIPTION - DESCRIPTORS
DESCRIPTORS: ACHING
DESCRIPTORS: BURNING;DISCOMFORT
DESCRIPTORS: ACHING
DESCRIPTORS: BURNING;DISCOMFORT

## 2017-11-05 NOTE — PLAN OF CARE
Problem: Pancreatitis, Acute/Chronic (Adult)  Goal: Signs and Symptoms of Listed Potential Problems Will be Absent, Minimized or Managed (Pancreatitis, Acute/Chronic)  Signs and symptoms of listed potential problems will be absent, minimized or managed by discharge/transition of care (reference Pancreatitis, Acute/Chronic (Adult) CPG).   Outcome: No Change  Slept well after Ativan, 3 episodes of desaturation down to the 82% with spontaneous return of such back to the mid 90's. Awake last at 0400 restless but able to resettle with turn to opposite side.  Lung sounds continue to be diminished throughout with rhonchi especially to upper lobes.   BiPAP settings unchanged at 18/5 and 40% FiO2.  Awaken with restlessness but immediately returns to peaceful sleeping.  Responds to name only - too sommulent to answer questions.  No tremors noted as those daughter noted at home.  Continue with plan of care.  No further Ativan given. Continue with close monitoring.

## 2017-11-05 NOTE — PROGRESS NOTES
Active for most of evening, with  up in halls or in recliner or rocking chair in room.  Using medication for pain less during late afternoon and evening.  Receiving morphine 4 mg about every 3 hours for comfort.  Continues to pass flatus but no stool as yet.  Remains NPO, using swabs to moisten mouth.  Plan of care continues.

## 2017-11-05 NOTE — PROGRESS NOTES
Slept well through the night, up numerous times to void but went back to bed and slept.  Used Morphine times 2 during night and just took dilaudid as wants to be more wakeful during day.  Turning self mostly on own.  States pain is more in her back than in her abdomen.  Continue to encourage ambulation and needs to be using IS more only able to get to 500 cc.  Plan of care continues.

## 2017-11-05 NOTE — PROGRESS NOTES
[facility]           Assessment and Plan:     Kitty Bangura is a 56 year old female with no significant PMH who now presents with abdominal pain.          Acute pancreatitis  11/1/17 --  Lipase 49466.  Received 3L of LR in ED. Suspect alcoholic trigger.  NPO except for medications, IVF at 250cc/hr of LR, pain management with IV dilaudid     11/2/2017 -- improving based on symptoms and labs, but still having significant pain.  Continue NPO on dilaudid today, consider starting clears tomorrow if pain doing well.     11/3/2017 -- symptoms improved a bit again today but still needing frequent morphine (more effective than initial dilaudid).     11/4/2017 -- symptomatically improving, lipase down.  However, bili is up and WBC still up albeit improving again as below.  Continue NPO.  Now having good urine output.  11/5/2017 -- lipase normalized, pain improving - still needing morphine/dialudid overnight but only once so far today and thinks she'd be fine with orals.  Repeat CT abdomen showed: pancreatic phlegmon.  No rim-enhancing pseudocyst is yet visible. There is increasing retroperitoneal and mesenteric inflammatory stranding. There is also increasing mild to moderate peritoneal fluid.  Saline locking IV, starting clears.  Plan to place NG/NJ and start enteral feeds if unable to tolerate clears.  Discussed with pancreato-biliary GI from [facility] today who agreed with this plan.  They also recommended checking a paracentesis of her ascites tomorrow (ordered) due to concern about a possible leak - they are on all week, we can call them with results of this once available.       Elevated bili (conjugated)  11/4/2017 -- nothing that looks like hemolysis, no worsening pain or clear right upper quadrant pain.  Liver enzymes normal other than faintly elevated AST.  Doubt cholecystitis and less so cholangitis (see discussion below).    11/5/2017 -- liver enzymes remain normal - patient  clinically improving, CT and US negative for any cholecystitis/cholangitis/obstruction.  Suspect due to pancreatitis/inflammation and that this will normalize with resolution of pancreatitis.  GI agreed with this assessment/plan.       Alcohol Dependence  11/1/17 -- Reports drinking 2-3 glasses of wine daily  On admission, does not appear to be going through withdrawals  - CIWA protocol in place  - thiamine, folic acid, multivitamin, magnesium/phosphorus therapy initiated. (IV given NPO)  11/5/2017 -- CIWAs 0, stopping checks.  Nothing for withdrawal.        Pancreatic mass  11/1/17 -- Discussed with Dr. Sanches of the [facility] (GI).  No acute intervention of this mass.  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    - will need close follow up with outpatient GI for ongoing evaluation of new-found mass  11/5/2017 -- mass appears to have resolved on follow-up CT - may not need GI follow-up on discharge - can confirm with GI prior to discharge.        Leukocytosis with elevated lactic - consistent with SIRS from pancreatitis as above  11/1/17 -- WBC 23.2 on arrival.  Significant stranding seen on CT. Per Dr. Sanches (see above), appears aggressive and suggests that rapid administration of LR would be prudent to avoid/derail the development of necrosis.  Given 3L of LR.  11/2/2017 -- clinical picture improving, WBC down to 12 from 23 on admission without antibiotics, procalcitonin low risk for systemic infection, afebrile, lactic elevated yesterday but normalized with continuing LR at 250/h overnight.  Overall consistent with improving SIRS due to pancreatitis as above, nothing for acute bacterial infection at present.  Continue LR at 250/h for now  11/3/2017 -- WBC now back to 17, afebrile, symptoms improving slowly with no new symptoms.  Doubt acute infection but unusual - will recheck CBC now - if WBC back to > 20 will consider repeating UA, checking chest x-ray and blood  cultures and starting empirically on levaquin/flagyl in case she is developing some infected necrosis.    11/4/2017 -- WBC improving again today but still up.  With bili up and picture showing improvement but slowly I think caution is appropriate here although I don't see clear evidence of any acute infection - will send blood cultures, start empirically on levaquin/flagyl for coverage of pancreas and biliary tree, and check abdominal US as above.  Lactic normal and patient appears adequately volume resuscitated.    11/5/2017 -- procalcitonin from yesterday shows moderate risk of systemic infection - on antibiotics as above - repeat procalcitonin today pending.  CT and US as above.  WBC essentially unchanged at 17, afebrile.  Continue current antibiotics and follow for now.  Per discussion with GI they thought this was reasonable but doubted that WBC was due to infection.       Pleural effusion  11/5/2017 -- due to volume resuscitation, saline locking IV and starting lasix 20 mg IV daily today (lasix naive), increase if poor diuresis with this.      Anemia  11/5/2017 -- mild, suspect dilutional.  Follow.          Hypophosphatemia  11/5/2017 -- starting replacement.  Overall I think she is low risk for refeeding syndrome and that the benefit outweighs the risk of starting some clears today despite this but we'll need to follow it.      Hypokalemia  11/5/2017 -- mild, replace for now, will normalize once taking orals.        Fatty liver  11/1/17 -- Seen on US.  Follow-up with primary care provider as outpatient and with GI as above.        DVT Prophylaxis: Lovenox for DVT, ranitidine IV while NPO.        Code Status: Full Code     Lines  PIV    Disposition  Improving but slowly.  anticipate at least 2-3 more days inpatient.             Interval History:   Symptomatically improving.  Pain better, has been mostly a 3-4 today with activity, slightly better at rest.  Well-controlled with pain medications and needing these  much less often.  No fever or chills.  Breathing unchanged - slightly labored, but no dyspnea.  No new pain.  No nausea or vomiting.  Feels hungry and would like to eat today.  Tolerated oral contrast for CT well this AM with no nausea or pain.  Had a BM x 2 this AM.             Review of Systems:    ROS: 10 point ROS neg other than the symptoms noted above in the HPI.             Medications:   Current active medications and PTA medications reviewed, see medication list for details.            Physical Exam:   Vitals were reviewed  Patient Vitals for the past 24 hrs:   BP Temp Temp src Pulse Resp SpO2   17 0706 132/68 97.9  F (36.6  C) Oral 89 18 94 %   17 0305 144/77 98.4  F (36.9  C) Oral 91 18 92 %   17 2350 - - - - - 92 %   17 2347 162/90 98.6  F (37  C) Oral 92 16 (!) 84 %   17 1953 139/79 98.5  F (36.9  C) Oral 95 20 95 %   17 1530 - - - - - 96 %   17 1520 - - - - - 96 %   17 1510 - - - - - 96 %   17 1410 140/80 - - 100 15 93 %   17 1340 141/81 - - - 16 -   17 1330 141/81 - - 101 16 -   17 1300 - 98.6  F (37  C) - - - -       Temperatures:  Current - Temp: 97.9  F (36.6  C); Max - Temp  Av.4  F (36.9  C)  Min: 97.9  F (36.6  C)  Max: 98.6  F (37  C)  Respiration range: Resp  Av  Min: 15  Max: 20  Pulse range: Pulse  Av.7  Min: 89  Max: 101  Blood pressure range: Systolic (24hrs), Av , Min:132 , Max:162   ; Diastolic (24hrs), Av, Min:68, Max:90    Pulse oximetry range: SpO2  Av.1 %  Min: 84 %  Max: 96 %  I/O last 3 completed shifts:  In: 2937.92 [I.V.:2937.92]  Out:  [Urine:]    Intake/Output Summary (Last 24 hours) at 17 1131  Last data filed at 17 1033   Gross per 24 hour   Intake          2637.92 ml   Output             2250 ml   Net           387.92 ml     EXAM:  General: awake and alert, NAD, oriented x 3  Head: normocephalic  Neck: unremarkable, no lymphadenopathy   HEENT: oropharynx  pink and moist    Heart: Regular rate and rhythm, no murmurs, rubs, or gallops  Lungs: clear to auscultation bilaterally with good air movement throughout  Abdomen: soft, tender just in upper mid epigastric area with no rebound or guarding, no masses or organomegaly, non-tender in right upper quadrant or left upper quadrant, normal bowel sounds throughout.    Extremities: no edema in lower extremities   Skin unremarkable.               Data:     Results for orders placed or performed during the hospital encounter of 11/01/17 (from the past 24 hour(s))   Lactic acid level STAT   Result Value Ref Range    Lactic Acid 1.0 0.7 - 2.0 mmol/L   Blood culture   Result Value Ref Range    Specimen Description Blood LINE DRAW     Special Requests Aerobic and anaerobic bottles received     Culture Micro No growth after 10 hours    Procalcitonin   Result Value Ref Range    Procalcitonin 0.88 ng/ml   Blood culture   Result Value Ref Range    Specimen Description Blood Right Arm     Special Requests Aerobic and anaerobic bottles received     Culture Micro No growth after 9 hours    UA with Microscopic reflex to Culture   Result Value Ref Range    Color Urine Orange     Appearance Urine Slightly Cloudy     Glucose Urine Negative NEG^Negative mg/dL    Bilirubin Urine Small (A) NEG^Negative    Ketones Urine >150 (A) NEG^Negative mg/dL    Specific Gravity Urine 1.019 1.003 - 1.035    Blood Urine Moderate (A) NEG^Negative    pH Urine 6.5 5.0 - 7.0 pH    Protein Albumin Urine 100 (A) NEG^Negative mg/dL    Urobilinogen mg/dL Normal 0.0 - 2.0 mg/dL    Nitrite Urine Negative NEG^Negative    Leukocyte Esterase Urine Trace (A) NEG^Negative    Source Midstream Urine     WBC Urine 13 (H) 0 - 2 /HPF    RBC Urine 29 (H) 0 - 2 /HPF    Squamous Epithelial /HPF Urine 6 (H) 0 - 1 /HPF    Transitional Epi 1 0 - 1 /HPF    Mucous Urine Present (A) NEG^Negative /LPF   Urine Culture Aerobic Bacterial   Result Value Ref Range    Specimen Description  Midstream Urine     Special Requests Specimen received in preservative     Culture Micro PENDING    US Abdomen Complete-1    Narrative    US ABDOMEN COMPLETE 11/4/2017 7:47 PM    HISTORY: Pancreatitis.    TECHNIQUE: Routine assessed structures include the gallbladder, bile  ducts, liver, pancreas, kidneys, and spleen size. Also assessed are  the abdominal aorta and hepatic portion of the IVC.    COMPARISON: None.    FINDINGS: The liver is echogenic, with diffuse fatty infiltration and  measures 17.9 cm in diameter. Visualized portions of the pancreas  appear edematous.    The spleen is unremarkable.    Normal gallbladder. No sonographic evidence of gallstones. Normal  gallbladder wall thickness. No sonographic Sol sign.    The kidneys are normal bilaterally, measuring 11.5 cm on right and  11.9 cm on the left.    Visualized aorta and IVC are normal.    Small amount of ascites.      Impression    IMPRESSION:    1. Echogenic liver with diffuse fatty infiltration.  2. The previously noted pancreatic tail mass on prior CT scan  performed on November 1, 2017, is not well appreciated on today's  exam. Visualized portions of pancreas appear edematous.  3. No evidence of cholelithiasis or cholecystitis.      4. Ascites.    JOHN CAREY MD   CBC with platelets differential   Result Value Ref Range    WBC 17.0 (H) 4.0 - 11.0 10e9/L    RBC Count 3.20 (L) 3.8 - 5.2 10e12/L    Hemoglobin 10.4 (L) 11.7 - 15.7 g/dL    Hematocrit 31.2 (L) 35.0 - 47.0 %    MCV 98 78 - 100 fl    MCH 32.5 26.5 - 33.0 pg    MCHC 33.3 31.5 - 36.5 g/dL    RDW 11.9 10.0 - 15.0 %    Platelet Count 183 150 - 450 10e9/L    Diff Method Automated Method     % Neutrophils 79.6 %    % Lymphocytes 6.8 %    % Monocytes 13.0 %    % Eosinophils 0.1 %    % Basophils 0.1 %    % Immature Granulocytes 0.4 %    Absolute Neutrophil 13.5 (H) 1.6 - 8.3 10e9/L    Absolute Lymphocytes 1.2 0.8 - 5.3 10e9/L    Absolute Monocytes 2.2 (H) 0.0 - 1.3 10e9/L    Absolute  Eosinophils 0.0 0.0 - 0.7 10e9/L    Absolute Basophils 0.0 0.0 - 0.2 10e9/L    Abs Immature Granulocytes 0.1 0 - 0.4 10e9/L   Basic metabolic panel   Result Value Ref Range    Sodium 139 133 - 144 mmol/L    Potassium 3.3 (L) 3.4 - 5.3 mmol/L    Chloride 106 94 - 109 mmol/L    Carbon Dioxide 25 20 - 32 mmol/L    Anion Gap 8 3 - 14 mmol/L    Glucose 84 70 - 99 mg/dL    Urea Nitrogen 10 7 - 30 mg/dL    Creatinine 0.53 0.52 - 1.04 mg/dL    GFR Estimate >90 >60 mL/min/1.7m2    GFR Estimate If Black >90 >60 mL/min/1.7m2    Calcium 7.6 (L) 8.5 - 10.1 mg/dL   Hepatic panel   Result Value Ref Range    Bilirubin Direct 2.7 (H) 0.0 - 0.2 mg/dL    Bilirubin Total 4.1 (H) 0.2 - 1.3 mg/dL    Albumin 2.0 (L) 3.4 - 5.0 g/dL    Protein Total 5.3 (L) 6.8 - 8.8 g/dL    Alkaline Phosphatase 67 40 - 150 U/L    ALT 26 0 - 50 U/L    AST 53 (H) 0 - 45 U/L   Lipase   Result Value Ref Range    Lipase 336 73 - 393 U/L   CT Abd/Pelvis w Contrast*    Narrative    CT ABDOMEN AND PELVIS WITH CONTRAST  11/5/2017 8:37 AM     HISTORY: Acute pancreatitis, elevated bilirubin and WBC.    CONTRAST DOSE:  100 mL Isovue 370    TECHNIQUE:  Radiation dose for this scan was reduced using automated  exposure control, adjustment of the mA and/or kV according to patient  size, or iterative reconstruction technique.    FINDINGS:  There are small to moderate-sized bilateral pleural  effusions, new since 11/1/2017, with adjacent parenchymal opacity  likely representing relaxation atelectasis. There is increased fluid  attenuation at the junction of the head/body of the pancreas, not well  defined. Adjacent inflammatory stranding is noted. Lesion within the  tail of the pancreas appears unchanged. There is increased stranding  or edema within the mesentery. There is also small to moderate amount  of peritoneal fluid adjacent to the liver/spleen and within the  pelvis, new since the prior scan. Hepatic fatty infiltration is again  noted. There is no evidence of  bowel obstruction. No free peritoneal  air.      Impression    IMPRESSION:  1. Pancreatitis. There is increasing fluid attenuation at the junction  of the head/body within the pancreatic substance suggesting phlegmon.  No rim-enhancing pseudocyst is yet visible. There is increasing  retroperitoneal and mesenteric inflammatory stranding. There is also  increasing mild to moderate peritoneal fluid.  2. New mild to moderate bilateral pleural effusions and adjacent lower  lobe atelectasis when compared to 11/1/2017.  3. Hepatic fatty infiltration.    SACHIN REYES MD           Attestation:  I have reviewed today's vital signs, notes, medications, labs and imaging.  Amount of time performed on this daily note: 50 minutes.     Stanley Melgoza MD, MD

## 2017-11-06 ENCOUNTER — CARE COORDINATION (OUTPATIENT)
Dept: GASTROENTEROLOGY | Facility: CLINIC | Age: 57
End: 2017-11-06

## 2017-11-06 ENCOUNTER — APPOINTMENT (OUTPATIENT)
Dept: GENERAL RADIOLOGY | Facility: CLINIC | Age: 57
DRG: 439 | End: 2017-11-06
Attending: INTERNAL MEDICINE

## 2017-11-06 ENCOUNTER — APPOINTMENT (OUTPATIENT)
Dept: ULTRASOUND IMAGING | Facility: CLINIC | Age: 57
DRG: 439 | End: 2017-11-06
Attending: FAMILY MEDICINE

## 2017-11-06 ENCOUNTER — APPOINTMENT (OUTPATIENT)
Dept: MRI IMAGING | Facility: CLINIC | Age: 57
DRG: 439 | End: 2017-11-06
Attending: INTERNAL MEDICINE

## 2017-11-06 LAB
ALBUMIN SERPL-MCNC: 1.9 G/DL (ref 3.4–5)
ALP SERPL-CCNC: 76 U/L (ref 40–150)
ALT SERPL W P-5'-P-CCNC: 31 U/L (ref 0–50)
ANION GAP SERPL CALCULATED.3IONS-SCNC: 7 MMOL/L (ref 3–14)
AST SERPL W P-5'-P-CCNC: 51 U/L (ref 0–45)
BASOPHILS # BLD AUTO: 0 10E9/L (ref 0–0.2)
BASOPHILS NFR BLD AUTO: 0.1 %
BILIRUB SERPL-MCNC: 5.3 MG/DL (ref 0.2–1.3)
BUN SERPL-MCNC: 10 MG/DL (ref 7–30)
CALCIUM SERPL-MCNC: 7.5 MG/DL (ref 8.5–10.1)
CHLORIDE SERPL-SCNC: 102 MMOL/L (ref 94–109)
CO2 SERPL-SCNC: 29 MMOL/L (ref 20–32)
CREAT SERPL-MCNC: 0.58 MG/DL (ref 0.52–1.04)
DIFFERENTIAL METHOD BLD: ABNORMAL
EOSINOPHIL # BLD AUTO: 0.1 10E9/L (ref 0–0.7)
EOSINOPHIL NFR BLD AUTO: 0.4 %
ERYTHROCYTE [DISTWIDTH] IN BLOOD BY AUTOMATED COUNT: 11.9 % (ref 10–15)
GFR SERPL CREATININE-BSD FRML MDRD: >90 ML/MIN/1.7M2
GLUCOSE SERPL-MCNC: 99 MG/DL (ref 70–99)
HCT VFR BLD AUTO: 30.2 % (ref 35–47)
HGB BLD-MCNC: 10.1 G/DL (ref 11.7–15.7)
IMM GRANULOCYTES # BLD: 0.2 10E9/L (ref 0–0.4)
IMM GRANULOCYTES NFR BLD: 1.3 %
INR PPP: 1.31 (ref 0.86–1.14)
LIPASE SERPL-CCNC: 116 U/L (ref 73–393)
LYMPHOCYTES # BLD AUTO: 1.2 10E9/L (ref 0.8–5.3)
LYMPHOCYTES NFR BLD AUTO: 7.5 %
MAGNESIUM SERPL-MCNC: 2.2 MG/DL (ref 1.6–2.3)
MCH RBC QN AUTO: 32 PG (ref 26.5–33)
MCHC RBC AUTO-ENTMCNC: 33.4 G/DL (ref 31.5–36.5)
MCV RBC AUTO: 96 FL (ref 78–100)
MONOCYTES # BLD AUTO: 2.3 10E9/L (ref 0–1.3)
MONOCYTES NFR BLD AUTO: 14.7 %
NEUTROPHILS # BLD AUTO: 11.9 10E9/L (ref 1.6–8.3)
NEUTROPHILS NFR BLD AUTO: 76 %
PHOSPHATE SERPL-MCNC: 1.3 MG/DL (ref 2.5–4.5)
PHOSPHATE SERPL-MCNC: 1.3 MG/DL (ref 2.5–4.5)
PLATELET # BLD AUTO: 219 10E9/L (ref 150–450)
POTASSIUM SERPL-SCNC: 3.2 MMOL/L (ref 3.4–5.3)
POTASSIUM SERPL-SCNC: 3.5 MMOL/L (ref 3.4–5.3)
PROT SERPL-MCNC: 5.2 G/DL (ref 6.8–8.8)
RBC # BLD AUTO: 3.16 10E12/L (ref 3.8–5.2)
SODIUM SERPL-SCNC: 138 MMOL/L (ref 133–144)
WBC # BLD AUTO: 15.6 10E9/L (ref 4–11)

## 2017-11-06 PROCEDURE — 25000125 ZZHC RX 250: Performed by: FAMILY MEDICINE

## 2017-11-06 PROCEDURE — A9585 GADOBUTROL INJECTION: HCPCS | Performed by: INTERNAL MEDICINE

## 2017-11-06 PROCEDURE — 25000128 H RX IP 250 OP 636: Performed by: PHYSICIAN ASSISTANT

## 2017-11-06 PROCEDURE — 25000132 ZZH RX MED GY IP 250 OP 250 PS 637: Performed by: FAMILY MEDICINE

## 2017-11-06 PROCEDURE — S0028 INJECTION, FAMOTIDINE, 20 MG: HCPCS | Performed by: FAMILY MEDICINE

## 2017-11-06 PROCEDURE — 83735 ASSAY OF MAGNESIUM: CPT | Performed by: FAMILY MEDICINE

## 2017-11-06 PROCEDURE — 25000128 H RX IP 250 OP 636: Performed by: INTERNAL MEDICINE

## 2017-11-06 PROCEDURE — 25000132 ZZH RX MED GY IP 250 OP 250 PS 637: Performed by: INTERNAL MEDICINE

## 2017-11-06 PROCEDURE — 76705 ECHO EXAM OF ABDOMEN: CPT

## 2017-11-06 PROCEDURE — 85025 COMPLETE CBC W/AUTO DIFF WBC: CPT | Performed by: FAMILY MEDICINE

## 2017-11-06 PROCEDURE — 71020 XR CHEST 2 VW: CPT

## 2017-11-06 PROCEDURE — 85610 PROTHROMBIN TIME: CPT | Performed by: FAMILY MEDICINE

## 2017-11-06 PROCEDURE — 99233 SBSQ HOSP IP/OBS HIGH 50: CPT | Performed by: INTERNAL MEDICINE

## 2017-11-06 PROCEDURE — 25000132 ZZH RX MED GY IP 250 OP 250 PS 637: Performed by: PHYSICIAN ASSISTANT

## 2017-11-06 PROCEDURE — 74183 MRI ABD W/O CNTR FLWD CNTR: CPT

## 2017-11-06 PROCEDURE — 36415 COLL VENOUS BLD VENIPUNCTURE: CPT | Performed by: FAMILY MEDICINE

## 2017-11-06 PROCEDURE — 27210995 ZZH RX 272: Performed by: INTERNAL MEDICINE

## 2017-11-06 PROCEDURE — 80053 COMPREHEN METABOLIC PANEL: CPT | Performed by: FAMILY MEDICINE

## 2017-11-06 PROCEDURE — 87070 CULTURE OTHR SPECIMN AEROBIC: CPT | Performed by: INTERNAL MEDICINE

## 2017-11-06 PROCEDURE — 12000010 ZZH R&B MS INTERMEDIATE OVERFLOW

## 2017-11-06 PROCEDURE — 84100 ASSAY OF PHOSPHORUS: CPT | Performed by: FAMILY MEDICINE

## 2017-11-06 PROCEDURE — 25000128 H RX IP 250 OP 636: Performed by: FAMILY MEDICINE

## 2017-11-06 PROCEDURE — 83690 ASSAY OF LIPASE: CPT | Performed by: FAMILY MEDICINE

## 2017-11-06 PROCEDURE — 84132 ASSAY OF SERUM POTASSIUM: CPT | Performed by: INTERNAL MEDICINE

## 2017-11-06 PROCEDURE — 87205 SMEAR GRAM STAIN: CPT | Performed by: INTERNAL MEDICINE

## 2017-11-06 PROCEDURE — 84100 ASSAY OF PHOSPHORUS: CPT | Performed by: INTERNAL MEDICINE

## 2017-11-06 RX ORDER — GADOBUTROL 604.72 MG/ML
9 INJECTION INTRAVENOUS ONCE
Status: COMPLETED | OUTPATIENT
Start: 2017-11-06 | End: 2017-11-06

## 2017-11-06 RX ORDER — ACYCLOVIR 200 MG/1
60 CAPSULE ORAL ONCE
Status: COMPLETED | OUTPATIENT
Start: 2017-11-06 | End: 2017-11-06

## 2017-11-06 RX ORDER — FUROSEMIDE 10 MG/ML
20 INJECTION INTRAMUSCULAR; INTRAVENOUS
Status: DISCONTINUED | OUTPATIENT
Start: 2017-11-06 | End: 2017-11-07

## 2017-11-06 RX ADMIN — HYDROMORPHONE HYDROCHLORIDE 0.5 MG: 1 INJECTION, SOLUTION INTRAMUSCULAR; INTRAVENOUS; SUBCUTANEOUS at 02:38

## 2017-11-06 RX ADMIN — FAMOTIDINE 20 MG: 10 INJECTION, SOLUTION INTRAVENOUS at 10:10

## 2017-11-06 RX ADMIN — ACETAMINOPHEN 650 MG: 325 TABLET, FILM COATED ORAL at 23:04

## 2017-11-06 RX ADMIN — Medication 5 MG: at 20:39

## 2017-11-06 RX ADMIN — LEVOFLOXACIN 500 MG: 5 INJECTION, SOLUTION INTRAVENOUS at 16:27

## 2017-11-06 RX ADMIN — METRONIDAZOLE 500 MG: 500 INJECTION, SOLUTION INTRAVENOUS at 04:15

## 2017-11-06 RX ADMIN — METRONIDAZOLE 500 MG: 500 INJECTION, SOLUTION INTRAVENOUS at 10:13

## 2017-11-06 RX ADMIN — ONDANSETRON 4 MG: 2 INJECTION INTRAMUSCULAR; INTRAVENOUS at 07:44

## 2017-11-06 RX ADMIN — ENOXAPARIN SODIUM 40 MG: 40 INJECTION SUBCUTANEOUS at 21:23

## 2017-11-06 RX ADMIN — SODIUM CHLORIDE 60 ML: 9 INJECTION, SOLUTION INTRAMUSCULAR; INTRAVENOUS; SUBCUTANEOUS at 15:26

## 2017-11-06 RX ADMIN — POTASSIUM PHOSPHATE, MONOBASIC AND POTASSIUM PHOSPHATE, DIBASIC 20 MMOL: 224; 236 INJECTION, SOLUTION INTRAVENOUS at 11:48

## 2017-11-06 RX ADMIN — OXYCODONE HYDROCHLORIDE 5 MG: 5 TABLET ORAL at 07:42

## 2017-11-06 RX ADMIN — ONDANSETRON 4 MG: 2 INJECTION INTRAMUSCULAR; INTRAVENOUS at 02:37

## 2017-11-06 RX ADMIN — METRONIDAZOLE 500 MG: 500 INJECTION, SOLUTION INTRAVENOUS at 21:23

## 2017-11-06 RX ADMIN — METRONIDAZOLE 500 MG: 500 INJECTION, SOLUTION INTRAVENOUS at 16:29

## 2017-11-06 RX ADMIN — ONDANSETRON 4 MG: 2 INJECTION INTRAMUSCULAR; INTRAVENOUS at 18:01

## 2017-11-06 RX ADMIN — Medication 2.5 MG: at 11:44

## 2017-11-06 RX ADMIN — POTASSIUM CHLORIDE 40 MEQ: 1500 TABLET, EXTENDED RELEASE ORAL at 11:38

## 2017-11-06 RX ADMIN — FUROSEMIDE 20 MG: 10 INJECTION, SOLUTION INTRAVENOUS at 07:50

## 2017-11-06 RX ADMIN — POTASSIUM PHOSPHATE, MONOBASIC AND POTASSIUM PHOSPHATE, DIBASIC 20 MMOL: 224; 236 INJECTION, SOLUTION INTRAVENOUS at 23:00

## 2017-11-06 RX ADMIN — ACETAMINOPHEN 650 MG: 325 TABLET, FILM COATED ORAL at 07:43

## 2017-11-06 RX ADMIN — FAMOTIDINE 20 MG: 10 INJECTION, SOLUTION INTRAVENOUS at 21:13

## 2017-11-06 RX ADMIN — GADOBUTROL 9 ML: 604.72 INJECTION INTRAVENOUS at 15:25

## 2017-11-06 RX ADMIN — Medication 5 MG: at 16:40

## 2017-11-06 RX ADMIN — POTASSIUM CHLORIDE 20 MEQ: 1500 TABLET, EXTENDED RELEASE ORAL at 13:44

## 2017-11-06 RX ADMIN — FUROSEMIDE 20 MG: 10 INJECTION, SOLUTION INTRAVENOUS at 16:43

## 2017-11-06 ASSESSMENT — PAIN DESCRIPTION - DESCRIPTORS
DESCRIPTORS: ACHING
DESCRIPTORS: ACHING

## 2017-11-06 NOTE — PROGRESS NOTES
[facility] Internal clinic intake:    Referring provider : Dr Melgoza     Providers RNCC contact - Name, Phone and Fax number: [phone]  FAX:  [phone]     Requested provider (if specified): Myron       Indication/Diagnosis for consultation: Pancreatitis and pancreatic mass     Is diagnosis on list of approved diagnosis: Yes     11/01 spoke to Dr Sanches     Has patient been evaluated in clinic or had a procedure [facility] provider in the last 5 years: No     Has patient been evaluated by another Gastroenterologist? Yes, if yes, who?  Dr Melgoza     If yes, do we have access to outside records from previous GI evaluation. Yes, all records in our system     Imaging completed:     CT scan     Yes   MRI       No     Procedures:     Upper Endoscopy/EGD No     Endoscopic Ultrasound/EUS No     ERCP No     Colonoscopy No       Are images able/being pushed to our system? In [facility]     Is patient aware of request for clinc consultation and ok to be contacted to schedule? Yes     Referral Received: 11/6/2017    Hard Copy chart created/ Records received: in EPIC     MD review date:                             Clinical History (per RN review of records provided):       CT 11/5/2017- in EPIC  IMPRESSION:  1. Pancreatitis. There is increasing fluid attenuation at the junction  of the head/body within the pancreatic substance suggesting phlegmon.  No rim-enhancing pseudocyst is yet visible. There is increasing  retroperitoneal and mesenteric inflammatory stranding. There is also  increasing mild to moderate peritoneal fluid.  2. New mild to moderate bilateral pleural effusions and adjacent lower  lobe atelectasis when compared to 11/1/2017.  3. Hepatic fatty infiltration.  Recommendations/Orders:    Per Chart review: will need close clinic follow-up post discharge.     Sent to scheduling to schedule with Dr. Sanches in clinic when discharged.     Sarah Beth KINNEY RN Coordinator  Dr. Sanches, Dr[facility] Hanson &   [facility]   [facility]  [phone]

## 2017-11-06 NOTE — PROGRESS NOTES
[facility]    Hospital Medicine Progress Note  Date of Service: 11/06/2017    Assessment & Plan       Kitty Bangura is a 56 year old female with no significant PMH who now presented with abdominal pain and new dx of pancreatitis          1.Acute pancreatitis- presented 11/1 with lipase 58502.  abd u/s did not show any obstruction.she was treated conservatively and lipase has resolved. Abd pain has cont though improved and now direct bili increasing.  She has had 2 ct abd, the second one showing probable pancreatic phlegmon, ascites and retroperitoneal and mesenteric inflammation. The prior hospitalist had been inn contact with DR azevedo at [facility] who rec paracentesis bc of incr bili.  U/S done today but apparently too little fluid to tap.  Not clear what the cause of her pancreatitis is. Pt says she drinks 2 drinks a day- she did not have other sig liver enz elevation on admit and did not have etoh wd so I feel this is not likely etoh pancreatitis..  She has not had a triglyceride level done yet and will check today.  She could have a cbd stone and will get a MRI. She is on clear liqs and will cont today. Eating does not really change her pain. She sys pain better and wonnders if she can take less oxycodone so will decr to 1/2 tab prn      2.Elevated bili (conjugated)  -Bili cont to rise despite the fact that patient clinically improving, CT and US negative for any cholecystitis/cholangitis/obstruction.  Suspect due to pancreatitis/inflammation and that this will normalize with resolution of pancreatitis.  However, will get MRCP to r/o obstruction  .folow lft     3. Question of etoh abuse only bc presentation of pancreatitis without clear cause. Nothing else to support this dx.  11/1/17 -- Reports drinking 2 glasses of wine daily  On admission, did not appear to be going through withdrawals  - [facility] protocol in place  - thiamine, folic acid, multivitamin, magnesium/phosphorus therapy  initiated. (IV given NPO)  11/5/2017 -- CIWAs 0, stopping checks.  Nothing for withdrawal.        4.Pancreatic Phlegmon- secondary to acute pancreatitis          5.Leukocytosis with elevated lactic - consistent with SIRS from pancreatitis as above-lactate normal now and wbc improving. If mrcp negfor obstruction and wbc better tomorrow, will d/c antibx      11/1/17 -- WBC 23.2 on arrival.  Significant stranding seen on CT. Per Dr. Sanches (see above), appears aggressive and suggests that rapid administration of LR would be prudent to avoid/derail the development of necrosis.  Given 3L of LR.  11/2/2017 -- clinical picture improving, WBC down to 12 from 23 on admission without antibiotics, procalcitonin low risk for systemic infection, afebrile, lactic elevated yesterday but normalized with continuing LR at 250/h overnight.  Overall consistent with improving SIRS due to pancreatitis as above, nothing for acute bacterial infection at present.  Continue LR at 250/h for now  11/3/2017 -- WBC now back to 17, afebrile, symptoms improving slowly with no new symptoms.  Doubt acute infection but unusual - will recheck CBC now - if WBC back to > 20 will consider repeating UA, checking chest x-ray and blood cultures and starting empirically on levaquin/flagyl in case she is developing some infected necrosis.    11/4/2017 -- WBC improving again today but still up.  With bili up and picture showing improvement but slowly I think caution is appropriate here although I don't see clear evidence of any acute infection - will send blood cultures, start empirically on levaquin/flagyl for coverage of pancreas and biliary tree, and check abdominal US as above.  Lactic normal and patient appears adequately volume resuscitated.    11/5/2017 -- procalcitonin from yesterday shows moderate risk of systemic infection - on antibiotics as above - repeat procalcitonin 1.8 CT and US as above.  WBC essentially unchanged at 17, afebrile.  Continue  current antibiotics and follow for now.  Per discussion with GI they thought this was reasonable but doubted that WBC was due to infection.        6.pulm-Pleural effusion- likely due to vol overload plus pancreatitis. D/c iv fluid today and increase lasix to 20 bid. Check cxr today and again tomorrow. Also c/o brown sputum- check cx         7.Anemia  11/5/2017 -- mild, suspect dilutional.  Follow.           8.Hypophosphatemia-on replacement.       9.Hypokalemia  -on replacement      10.Fatty liver  11/1/17 -- Seen on US.  Follow-up with primary care provider as outpatient and with GI as above.        DVT Prophylaxis: Lovenox for DVT, ranitidine IV while NPO.    Code Status: Full Code  LinesPIV     Disposition  Improving . Needs to have stable or inproving bili and be able to eat to go home          Belenmikki Lord       Interval History   abd improving. Mid pain- across. Sharp at night and duller during day. Some n, no vomit. Says occ sob nut just walked in [place] without problem. Cough brownish sputum. No cp. Drinking a lot of water but doesn;t ;yoan the clear liq stuff on tray. Feels puffy all over. Wonders when she can go home    Physical Exam   Temp:  [98.4  F (36.9  C)-99.4  F (37.4  C)] 99.4  F (37.4  C)  Pulse:  [86-93] 86  Heart Rate:  [90-98] 92  Resp:  [16-20] 16  BP: (118-152)/(70-96) 136/81  SpO2:  [90 %-96 %] 92 %    Weights:   Vitals:    11/01/17 2350 11/03/17 0500 11/04/17 0417   Weight: 195 lb 1.7 oz (88.5 kg) 206 lb 2.1 oz (93.5 kg) 212 lb 1.3 oz (96.2 kg)    Body mass index is 35.29 kg/(m^2).    Constitutional: nad  CV: Regular  Respiratory: CTA bilaterally  GI: Soft, minimal diffuse tender  Skin: Warm and dry  Musculoskeletal:+1 le edema    Data     Recent Labs  Lab 11/06/17  0545 11/05/17  1545 11/05/17  0540 11/04/17  0545  11/01/17  1547   WBC 15.6*  --  17.0* 16.5*  < > 23.2*   HGB 10.1*  --  10.4* 11.3*  < > 15.0   MCV 96  --  98 100  < > 97     --  183 157  < > 313   INR 1.31*  --   --   --    --   --      --  139 138  < > 140   POTASSIUM 3.2* 3.4 3.3* 3.7  < > 3.6   CHLORIDE 102  --  106 104  < > 106   CO2 29  --  25 28  < > 23   BUN 10  --  10 12  < > 18   CR 0.58  --  0.53 0.59  < > 0.92   ANIONGAP 7  --  8 6  < > 11   ILIANA 7.5*  --  7.6* 7.4*  < > 8.9   GLC 99  --  84 111*  < > 168*   ALBUMIN 1.9*  --  2.1*  2.0* 2.2*  < > 4.0   PROTTOTAL 5.2*  --  5.3* 5.5*  < > 7.9   BILITOTAL 5.3*  --  4.1* 2.5*  < > 0.5   ALKPHOS 76  --  67 55  < > 79   ALT 31  --  26 24  < > 37   AST 51*  --  53* 66*  < > 28   LIPASE 116  --  336 1943*  < > 13952*   TROPI  --   --   --   --   --  <0.015   < > = values in this interval not displayed.      Recent Labs  Lab 11/06/17  0545 11/05/17  0540 11/04/17  0545 11/03/17  0515 11/02/17  0600 11/01/17  2200   GLC 99 84 111* 162* 189* 185*        Unresulted Labs Ordered in the Past 30 Days of this Admission     Date and Time Order Name Status Description    11/4/2017 1420 Blood culture Preliminary     11/4/2017 1420 Blood culture Preliminary            Medications        furosemide  20 mg Intravenous BID     levofloxacin  500 mg Intravenous Q24H     metroNIDAZOLE  500 mg Intravenous Q6H     famotidine  20 mg Intravenous Q12H     enoxaparin  40 mg Subcutaneous Q24H       Belen Lord

## 2017-11-06 NOTE — PROGRESS NOTES
"CLINICAL NUTRITION SERVICES  -  ASSESSMENT NOTE     REASON FOR ASSESSMENT  Kitty Bangura is a 56 year old female seen by Registered Dietitian for Provider Order - pancreatitis/malnutrition       NUTRITION HISTORY  - Information obtained from patient and . Patient placed on a clear liquid diet for breakfast 11/6 and reported to consume 1/3 of the jello, 1/3 of the apple and 1/4 of the coffee. Patient was not sure if pain post meal was due to food or current medications. Patient reported that she did not receive lunch because she was supposed to have an MRI. Unable to determine if patient is tolerating clear or full liquid diet order based on this consultation. RD to f/u 11/7 after patient has received a full liquid meal tray.     CURRENT NUTRITION ORDERS  Diet Order:     Full liquid      Current Intake/Tolerance:  20% per patient recall      PHYSICAL FINDINGS  Observed  No nutrition-related physical findings observed  Obtained from Chart/Interdisciplinary Team  None noted    ANTHROPOMETRICS  Height: 5' 5\"  Weight: 212 lbs 1.32 oz  Body mass index is 35.29 kg/(m^2).  Weight Status:  Obesity Grade II BMI 35-39.9  IBW: 125#  % IBW: 159%  Weight History:   Wt Readings from Last 10 Encounters:   11/04/17 96.2 kg (212 lb 1.3 oz)         LABS  Labs reviewed: potasium, calcium, phosphorus, lipase  Potassium   Date Value Ref Range Status   11/06/2017 3.2 (L) 3.4 - 5.3 mmol/L Final     Calcium   Date Value Ref Range Status   11/06/2017 7.5 (L) 8.5 - 10.1 mg/dL Final     Phosphorus   Date Value Ref Range Status   11/06/2017 1.3 (L) 2.5 - 4.5 mg/dL Final     Lipase   Date Value Ref Range Status   11/06/2017 116 73 - 393 U/L Final     MEDICATIONS  Medications reviewed.    Dosing Weight 65.3 kg    ASSESSED NUTRITION NEEDS PER APPROVED PRACTICE GUIDELINES:  Estimated Energy Needs: 5656-8127 kcals (25-35 Kcal/Kg)  Justification: maintenance and pancreatitis guidlines  Estimated Protein Needs: 78-98 grams protein (1.2-1.5 g " pro/Kg)  Justification: hypercatabolism with acute illness, preservation of lean body mass and pancreatitis guidlines  Estimated Fluid Needs: (1 mL/Kcal)  Justification: per provider pending fluid status and pancreatitis guidlines    MALNUTRITION:  % Weight Loss:  None noted  % Intake:  </= 50% for >/= 5 days (severe malnutrition)  Subcutaneous Fat Loss:  None observed  Muscle Loss:  None observed  Fluid Retention:  None noted    Malnutrition Diagnosis: Patient does not meet two of the above criteria necessary for diagnosing malnutrition    NUTRITION DIAGNOSIS:  Predicted inadequate nutrient intake related to pancreatitis as evidenced by patient reported 20% intake of clear liquid meal and  </= 50% for >/= 5 days    NUTRITION INTERVENTIONS  Recommendations / Nutrition Prescription  Patient to consume full liquid diet as tolerated and maintain food record of meal. Patient to consume a low-fat diet (<50g fat) once advanced to a regular diet and upon discharge per pancreatitis guidlines.   .      Implementation  Nutrition education: Provided education on Pancreatitis Nutrition Therapy  .      Nutrition Goals  Patient to tolerate 50% or more of full liquid meal in 1-2 days and advance diet as medically indicated.  .      MONITORING AND EVALUATION:  Progress towards goals will be monitored and evaluated per protocol and Practice Guidelines, Diet Order and Food intake      Maia Ornelas  Dietetic Intern

## 2017-11-06 NOTE — PLAN OF CARE
Problem: Pancreatitis, Acute/Chronic (Adult)  Goal: Signs and Symptoms of Listed Potential Problems Will be Absent, Minimized or Managed (Pancreatitis, Acute/Chronic)  Signs and symptoms of listed potential problems will be absent, minimized or managed by discharge/transition of care (reference Pancreatitis, Acute/Chronic (Adult) CPG).   Patient states she feels better than she did a couple days ago. She is still, however, requesting pain meds every 3-4 hours. She is currently tolerating clear liquids without concern. Will plan on US in the morning and evaluate from there. Continue to provide supportive cares.

## 2017-11-06 NOTE — PLAN OF CARE
Problem: Patient Care Overview  Goal: Plan of Care/Patient Progress Review  Outcome: Improving  Pt has had pain med's twice t/o the night for abdominal discomfort and fullness she also had some Zofran for nausea.  After receiving she was able to sleep for most of the day.  Lung sounds are very diminished t/o and she is requiring oxygen on and off for comfort 3 L per NC.  Will continue to monitor close for changes.

## 2017-11-06 NOTE — PROGRESS NOTES
Addendum    Received Call from Dr Sanches at [facility].  He has been folloing pt remotely and wanted to to [facility]. He says by CT, pt has severe necrotizing pancreatitis.  She basically has all but her tail of the pancreas involved and this is called disconnected duct.  He said that it is inevitable that she will need and endoscopic intervention, likely 3-4 weeks and if she is doing well could be d/c home with outpt f/u with them but stressed that she needs to f/u. It is ok to advance diet and ok with himi to d/c anitbx ( which I will do if MRCP neg for obstruction).  Belen Lord

## 2017-11-07 ENCOUNTER — APPOINTMENT (OUTPATIENT)
Dept: GENERAL RADIOLOGY | Facility: CLINIC | Age: 57
DRG: 439 | End: 2017-11-07
Attending: INTERNAL MEDICINE

## 2017-11-07 DIAGNOSIS — K86.89 PANCREATIC MASS: Primary | ICD-10-CM

## 2017-11-07 LAB
ALBUMIN SERPL-MCNC: 1.9 G/DL (ref 3.4–5)
ALP SERPL-CCNC: 87 U/L (ref 40–150)
ALT SERPL W P-5'-P-CCNC: 41 U/L (ref 0–50)
AST SERPL W P-5'-P-CCNC: 59 U/L (ref 0–45)
BILIRUB DIRECT SERPL-MCNC: 3.7 MG/DL (ref 0–0.2)
BILIRUB SERPL-MCNC: 5 MG/DL (ref 0.2–1.3)
CREAT SERPL-MCNC: 0.58 MG/DL (ref 0.52–1.04)
ERYTHROCYTE [DISTWIDTH] IN BLOOD BY AUTOMATED COUNT: 12.2 % (ref 10–15)
GFR SERPL CREATININE-BSD FRML MDRD: >90 ML/MIN/1.7M2
GRAM STN SPEC: NORMAL
HCT VFR BLD AUTO: 30.5 % (ref 35–47)
HGB BLD-MCNC: 10.3 G/DL (ref 11.7–15.7)
Lab: NORMAL
MCH RBC QN AUTO: 31.9 PG (ref 26.5–33)
MCHC RBC AUTO-ENTMCNC: 33.8 G/DL (ref 31.5–36.5)
MCV RBC AUTO: 94 FL (ref 78–100)
PHOSPHATE SERPL-MCNC: 1.7 MG/DL (ref 2.5–4.5)
PHOSPHATE SERPL-MCNC: 2.1 MG/DL (ref 2.5–4.5)
PLATELET # BLD AUTO: 245 10E9/L (ref 150–450)
POTASSIUM SERPL-SCNC: 3.2 MMOL/L (ref 3.4–5.3)
POTASSIUM SERPL-SCNC: 3.6 MMOL/L (ref 3.4–5.3)
PROT SERPL-MCNC: 5.3 G/DL (ref 6.8–8.8)
RBC # BLD AUTO: 3.23 10E12/L (ref 3.8–5.2)
SPECIMEN SOURCE: NORMAL
TRIGL SERPL-MCNC: 111 MG/DL
WBC # BLD AUTO: 14.7 10E9/L (ref 4–11)

## 2017-11-07 PROCEDURE — 84100 ASSAY OF PHOSPHORUS: CPT | Performed by: INTERNAL MEDICINE

## 2017-11-07 PROCEDURE — 25000125 ZZHC RX 250: Performed by: FAMILY MEDICINE

## 2017-11-07 PROCEDURE — 25000128 H RX IP 250 OP 636: Performed by: PHYSICIAN ASSISTANT

## 2017-11-07 PROCEDURE — 25000132 ZZH RX MED GY IP 250 OP 250 PS 637: Performed by: INTERNAL MEDICINE

## 2017-11-07 PROCEDURE — 99233 SBSQ HOSP IP/OBS HIGH 50: CPT | Performed by: INTERNAL MEDICINE

## 2017-11-07 PROCEDURE — 85027 COMPLETE CBC AUTOMATED: CPT | Performed by: INTERNAL MEDICINE

## 2017-11-07 PROCEDURE — 36415 COLL VENOUS BLD VENIPUNCTURE: CPT | Performed by: INTERNAL MEDICINE

## 2017-11-07 PROCEDURE — S0028 INJECTION, FAMOTIDINE, 20 MG: HCPCS | Performed by: FAMILY MEDICINE

## 2017-11-07 PROCEDURE — 71020 XR CHEST 2 VW: CPT

## 2017-11-07 PROCEDURE — 25000132 ZZH RX MED GY IP 250 OP 250 PS 637: Performed by: PHYSICIAN ASSISTANT

## 2017-11-07 PROCEDURE — 25000128 H RX IP 250 OP 636: Performed by: INTERNAL MEDICINE

## 2017-11-07 PROCEDURE — 25000128 H RX IP 250 OP 636: Performed by: FAMILY MEDICINE

## 2017-11-07 PROCEDURE — 84478 ASSAY OF TRIGLYCERIDES: CPT | Performed by: INTERNAL MEDICINE

## 2017-11-07 PROCEDURE — 12000010 ZZH R&B MS INTERMEDIATE OVERFLOW

## 2017-11-07 PROCEDURE — 80076 HEPATIC FUNCTION PANEL: CPT | Performed by: INTERNAL MEDICINE

## 2017-11-07 PROCEDURE — 82565 ASSAY OF CREATININE: CPT | Performed by: INTERNAL MEDICINE

## 2017-11-07 PROCEDURE — 84132 ASSAY OF SERUM POTASSIUM: CPT | Performed by: INTERNAL MEDICINE

## 2017-11-07 RX ADMIN — Medication 2.5 MG: at 01:49

## 2017-11-07 RX ADMIN — Medication 10 MEQ: at 14:59

## 2017-11-07 RX ADMIN — Medication 10 MEQ: at 16:10

## 2017-11-07 RX ADMIN — ACETAMINOPHEN 650 MG: 325 TABLET, FILM COATED ORAL at 13:03

## 2017-11-07 RX ADMIN — Medication 10 MEQ: at 12:59

## 2017-11-07 RX ADMIN — ACETAMINOPHEN 650 MG: 325 TABLET, FILM COATED ORAL at 04:45

## 2017-11-07 RX ADMIN — Medication 10 MEQ: at 13:58

## 2017-11-07 RX ADMIN — ENOXAPARIN SODIUM 40 MG: 40 INJECTION SUBCUTANEOUS at 21:07

## 2017-11-07 RX ADMIN — FAMOTIDINE 20 MG: 10 INJECTION, SOLUTION INTRAVENOUS at 08:42

## 2017-11-07 RX ADMIN — POTASSIUM PHOSPHATE, MONOBASIC AND POTASSIUM PHOSPHATE, DIBASIC 20 MMOL: 224; 236 INJECTION, SOLUTION INTRAVENOUS at 08:15

## 2017-11-07 RX ADMIN — ACETAMINOPHEN 650 MG: 325 TABLET, FILM COATED ORAL at 08:49

## 2017-11-07 RX ADMIN — METRONIDAZOLE 500 MG: 500 INJECTION, SOLUTION INTRAVENOUS at 03:49

## 2017-11-07 RX ADMIN — ACETAMINOPHEN 650 MG: 325 TABLET, FILM COATED ORAL at 21:07

## 2017-11-07 RX ADMIN — ONDANSETRON 4 MG: 2 INJECTION INTRAMUSCULAR; INTRAVENOUS at 04:53

## 2017-11-07 RX ADMIN — FUROSEMIDE 20 MG: 10 INJECTION, SOLUTION INTRAVENOUS at 16:13

## 2017-11-07 RX ADMIN — POTASSIUM PHOSPHATE, MONOBASIC AND POTASSIUM PHOSPHATE, DIBASIC 15 MMOL: 224; 236 INJECTION, SOLUTION INTRAVENOUS at 21:37

## 2017-11-07 RX ADMIN — ACETAMINOPHEN 650 MG: 325 TABLET, FILM COATED ORAL at 16:51

## 2017-11-07 RX ADMIN — ONDANSETRON 4 MG: 2 INJECTION INTRAMUSCULAR; INTRAVENOUS at 15:59

## 2017-11-07 RX ADMIN — FUROSEMIDE 20 MG: 10 INJECTION, SOLUTION INTRAVENOUS at 08:15

## 2017-11-07 RX ADMIN — FAMOTIDINE 20 MG: 10 INJECTION, SOLUTION INTRAVENOUS at 21:07

## 2017-11-07 ASSESSMENT — PAIN DESCRIPTION - DESCRIPTORS
DESCRIPTORS: ACHING

## 2017-11-07 NOTE — PROGRESS NOTES
[facility]    Hospital Medicine Progress Note  Date of Service: 11/07/2017    Assessment & Plan       Kitty Bangura is a 56 year old female with no significant PMH who now presented with abdominal pain and new dx of pancreatitis          1.Acute pancreatitis- presented 11/1 with lipase [id number].  abd u/s did not show any obstruction.she was treated conservatively and lipase has resolved. Abd pain has cont though improved .direct bili was  Increasing but today has finally decr.  Bc of this elevated bili and ? Ascites, a peritoneal tap was planned . At U/s there was not enough fluid to tap so then an MRCP was done which did not show any cbd obstruction but did show the severe necrotizing pancreatitis. DR Sanches, a pancreatic specialist at [facility] has been following remotely. I spoke with him 11/6 and he said the following: He has been folloing pt remotely and wanted to to [facility]. He says by CT, pt has severe necrotizing pancreatitis.  She basically has all but her tail of the pancreas involved and this is called disconnected duct.  He said that it is inevitable that she will need and endoscopic intervention, likely 3-4 weeks and if she is doing well could be d/c home with outpt f/u with them but stressed that she needs to f/u. It is ok to advance diet and ok with him to d/c anitbx  . If she can not eat then he recommended tube feeds- diet should be low fat  2.Elevated bili (conjugated)  -Bili peaked at 5.3 and now decreasing. CT and US negative for any cholecystitis/cholangitis/obstruction. MRCP neg for obstruction. Suspect due to pancreatitis/inflammation and that this will normalize with resolution of pancreatitis .folow lft     3. Question of etoh abuse only bc presentation of pancreatitis without clear cause. Nothing else to support this dx.Doubt this   11/1/17 -- Reports drinking 2 glasses of wine daily  On admission, did not appear to be going through withdrawals  - [facility] protocol  in place  - thiamine, folic acid, multivitamin, magnesium/phosphorus therapy initiated. (IV given NPO)  11/5/2017 -- CIWAs 0, stopping checks.  Nothing for withdrawal.            4.Leukocytosis with elevated lactic - consistent with SIRS from pancreatitis as above-lactate normal now and wbc improving. If mrcp negfor obstruction and wbc better tomorrow, will d/c antibx      11/1/17 -- WBC 23.2 on arrival.  Significant stranding seen on CT. Per Dr. Sanches (see above), appears aggressive and suggests that rapid administration of LR would be prudent to avoid/derail the development of necrosis.  Given 3L of LR.  11/2/2017 -- clinical picture improving, WBC down to 12 from 23 on admission without antibiotics, procalcitonin low risk for systemic infection, afebrile, lactic elevated yesterday but normalized with continuing LR at 250/h overnight.  Overall consistent with improving SIRS due to pancreatitis as above, nothing for acute bacterial infection at present.  Continue LR at 250/h for now  11/3/2017 -- WBC now back to 17, afebrile, symptoms improving slowly with no new symptoms.  Doubt acute infection but unusual - will recheck CBC now - if WBC back to > 20 will consider repeating UA, checking chest x-ray and blood cultures and starting empirically on levaquin/flagyl in case she is developing some infected necrosis.    11/4/2017 -- WBC improving again today but still up.  With bili up and picture showing improvement but slowly I think caution is appropriate here although I don't see clear evidence of any acute infection - will send blood cultures, start empirically on levaquin/flagyl for coverage of pancreas and biliary tree, and check abdominal US as above.  Lactic normal and patient appears adequately volume resuscitated.    11/5/2017 -- procalcitonin from yesterday shows moderate risk of systemic infection - on antibiotics as above - repeat procalcitonin 1.8 CT and US as above.  WBC essentially unchanged at 17,  afebrile.  Continue current antibiotics and follow for now.  Per discussion with GI they thought this was reasonable but doubted that WBC was due to infection.        5.pulm-Pleural effusion- likely due to vol overload plus pancreatitis. D/c iv fluid 11/6 and increase lasix to 20 bid. cxr really unchanged this am. cont lasix for now . no real sx and likely the pleural effusion just needs to be resorbed       7.Anemia  11/5/2017 -- mild, suspect dilutional.  Follow.           8.Hypophosphatemia-on replacement.       9.Hypokalemia  -on replacement      10.Fatty liver  11/1/17 -- Seen on US.  Follow-up with primary care provider as outpatient and with GI as above.    11. Hypokalemia- on replacement      DVT Prophylaxis: Lovenox for DVT, ranitidine IV while NPO.    Code Status: Full Code  LinesPIV     Disposition  Improving . Needs to  be able to eat to go home. Advance to reg with low fat and follow for now          Belen Lord       Interval History   Sx about the same as yest with same pain, little appetite. Still puffy. Less sputum and lighter and no sob    Physical Exam   Temp:  [98  F (36.7  C)-99.1  F (37.3  C)] 98.5  F (36.9  C)  Pulse:  [84-86] 85  Heart Rate:  [85-96] 85  Resp:  [16-18] 18  BP: (125-141)/(65-94) 129/94  SpO2:  [92 %-95 %] 95 %    Weights:   Vitals:    11/03/17 0500 11/04/17 0417 11/07/17 0500   Weight: 206 lb 2.1 oz (93.5 kg) 212 lb 1.3 oz (96.2 kg) 211 lb 13.8 oz (96.1 kg)    Body mass index is 35.26 kg/(m^2).    Constitutional: nad  CV: Regular  Respiratory: CTA bilaterally  GI: Soft, minimal diffuse tender  Skin: Warm and dry  Musculoskeletal:+1 le edema    Data     Recent Labs  Lab 11/07/17  0620 11/06/17  1815 11/06/17  0545  11/05/17  0540 11/04/17  0545  11/01/17  1547   WBC 14.7*  --  15.6*  --  17.0* 16.5*  < > 23.2*   HGB 10.3*  --  10.1*  --  10.4* 11.3*  < > 15.0   MCV 94  --  96  --  98 100  < > 97     --  219  --  183 157  < > 313   INR  --   --  1.31*  --   --   --   --    --    NA  --   --  138  --  139 138  < > 140   POTASSIUM 3.2* 3.5 3.2*  < > 3.3* 3.7  < > 3.6   CHLORIDE  --   --  102  --  106 104  < > 106   CO2  --   --  29  --  25 28  < > 23   BUN  --   --  10  --  10 12  < > 18   CR 0.58  --  0.58  --  0.53 0.59  < > 0.92   ANIONGAP  --   --  7  --  8 6  < > 11   ILIANA  --   --  7.5*  --  7.6* 7.4*  < > 8.9   GLC  --   --  99  --  84 111*  < > 168*   ALBUMIN 1.9*  --  1.9*  --  2.1*  2.0* 2.2*  < > 4.0   PROTTOTAL 5.3*  --  5.2*  --  5.3* 5.5*  < > 7.9   BILITOTAL 5.0*  --  5.3*  --  4.1* 2.5*  < > 0.5   ALKPHOS 87  --  76  --  67 55  < > 79   ALT 41  --  31  --  26 24  < > 37   AST 59*  --  51*  --  53* 66*  < > 28   LIPASE  --   --  116  --  336 1943*  < > 97046*   TROPI  --   --   --   --   --   --   --  <0.015   < > = values in this interval not displayed.      Recent Labs  Lab 11/06/17  0545 11/05/17  0540 11/04/17  0545 11/03/17  0515 11/02/17  0600 11/01/17  2200   GLC 99 84 111* 162* 189* 185*        Unresulted Labs Ordered in the Past 30 Days of this Admission     Date and Time Order Name Status Description    11/6/2017 1044 Sputum Culture Aerobic Bacterial In process     11/4/2017 1420 Blood culture Preliminary     11/4/2017 1420 Blood culture Preliminary            Medications        furosemide  20 mg Intravenous BID     famotidine  20 mg Intravenous Q12H     enoxaparin  40 mg Subcutaneous Q24H       Belen Lord

## 2017-11-07 NOTE — PLAN OF CARE
Problem: Pancreatitis, Acute/Chronic (Adult)  Goal: Signs and Symptoms of Listed Potential Problems Will be Absent, Minimized or Managed (Pancreatitis, Acute/Chronic)  Signs and symptoms of listed potential problems will be absent, minimized or managed by discharge/transition of care (reference Pancreatitis, Acute/Chronic (Adult) CPG).   Outcome: Improving  Patient tolerating low fat diet. Patient requesting choices from dietary prefers herbal type tea's. Patient ate about 50% of lunch tray. Tolerating fluids. Patient trying to use only tylenol for her abdominal pain.

## 2017-11-07 NOTE — PLAN OF CARE
Problem: Pancreatitis, Acute/Chronic (Adult)  Goal: Signs and Symptoms of Listed Potential Problems Will be Absent, Minimized or Managed (Pancreatitis, Acute/Chronic)  Signs and symptoms of listed potential problems will be absent, minimized or managed by discharge/transition of care (reference Pancreatitis, Acute/Chronic (Adult) CPG).   Outcome: No Change  Patient continues this day with reports of general weakness and lower abdominal pain.  Pt recieving PRN medications for pain with good results of decrease in pain.  Pt is standby assist and up to and from bathroom at pt soni.    Will continue to monitor pt.

## 2017-11-07 NOTE — PROGRESS NOTES
Pt was medicated @ 2040 with Oxy 5mg for increase in abd pain. States it started hurting more after she ate a little bit of ice cream. Pt had good relief with the Oxy and just requested and was give 2 Tylenol @ 2310. Rested some and requested only 2.5mg Oxy @ 0200 and the 2 Tylenol @ 0500 with continued good pain control. Pt has been up voiding every 30-45 min...good amt dk kendra urine. She has also has 3 loose brown stools. Pt seems in better spirits this morning. To xray for 2 view CXR. Pt also medicated with Zofran @ 0500 for intermittent nausea, no emesis.

## 2017-11-08 VITALS
HEIGHT: 65 IN | BODY MASS INDEX: 34.42 KG/M2 | OXYGEN SATURATION: 93 % | HEART RATE: 85 BPM | RESPIRATION RATE: 18 BRPM | TEMPERATURE: 98.8 F | DIASTOLIC BLOOD PRESSURE: 87 MMHG | SYSTOLIC BLOOD PRESSURE: 136 MMHG | WEIGHT: 206.57 LBS

## 2017-11-08 LAB
ALBUMIN SERPL-MCNC: 1.9 G/DL (ref 3.4–5)
ALP SERPL-CCNC: 99 U/L (ref 40–150)
ALT SERPL W P-5'-P-CCNC: 61 U/L (ref 0–50)
AST SERPL W P-5'-P-CCNC: 98 U/L (ref 0–45)
BILIRUB DIRECT SERPL-MCNC: 2.7 MG/DL (ref 0–0.2)
BILIRUB SERPL-MCNC: 3.6 MG/DL (ref 0.2–1.3)
C DIFF TOX B STL QL: NEGATIVE
PHOSPHATE SERPL-MCNC: 2.9 MG/DL (ref 2.5–4.5)
POTASSIUM SERPL-SCNC: 3.8 MMOL/L (ref 3.4–5.3)
PROT SERPL-MCNC: 5.2 G/DL (ref 6.8–8.8)
SPECIMEN SOURCE: NORMAL

## 2017-11-08 PROCEDURE — 84132 ASSAY OF SERUM POTASSIUM: CPT | Performed by: INTERNAL MEDICINE

## 2017-11-08 PROCEDURE — 80076 HEPATIC FUNCTION PANEL: CPT | Performed by: INTERNAL MEDICINE

## 2017-11-08 PROCEDURE — 84100 ASSAY OF PHOSPHORUS: CPT | Performed by: INTERNAL MEDICINE

## 2017-11-08 PROCEDURE — 99239 HOSP IP/OBS DSCHRG MGMT >30: CPT | Performed by: INTERNAL MEDICINE

## 2017-11-08 PROCEDURE — 25000132 ZZH RX MED GY IP 250 OP 250 PS 637: Performed by: INTERNAL MEDICINE

## 2017-11-08 PROCEDURE — 87493 C DIFF AMPLIFIED PROBE: CPT | Performed by: INTERNAL MEDICINE

## 2017-11-08 PROCEDURE — 25000125 ZZHC RX 250: Performed by: INTERNAL MEDICINE

## 2017-11-08 PROCEDURE — 36415 COLL VENOUS BLD VENIPUNCTURE: CPT | Performed by: INTERNAL MEDICINE

## 2017-11-08 PROCEDURE — 25000132 ZZH RX MED GY IP 250 OP 250 PS 637: Performed by: PHYSICIAN ASSISTANT

## 2017-11-08 RX ORDER — ACETAMINOPHEN 325 MG/1
650 TABLET ORAL EVERY 4 HOURS PRN
Qty: 100 TABLET | Status: ON HOLD
Start: 2017-11-08 | End: 2018-01-27

## 2017-11-08 RX ORDER — HYDROCODONE BITARTRATE AND ACETAMINOPHEN 5; 325 MG/1; MG/1
.5-1 TABLET ORAL EVERY 6 HOURS PRN
Qty: 10 TABLET | Refills: 0 | Status: SHIPPED | OUTPATIENT
Start: 2017-11-08 | End: 2017-12-09

## 2017-11-08 RX ORDER — HYDROCODONE BITARTRATE AND ACETAMINOPHEN 5; 325 MG/1; MG/1
.5-1 TABLET ORAL EVERY 6 HOURS PRN
Status: DISCONTINUED | OUTPATIENT
Start: 2017-11-08 | End: 2017-11-08 | Stop reason: HOSPADM

## 2017-11-08 RX ORDER — FAMOTIDINE 10 MG
10 TABLET ORAL 2 TIMES DAILY
Status: DISCONTINUED | OUTPATIENT
Start: 2017-11-08 | End: 2017-11-08 | Stop reason: CLARIF

## 2017-11-08 RX ADMIN — RANITIDINE 75 MG: 75 TABLET, FILM COATED ORAL at 09:42

## 2017-11-08 RX ADMIN — ACETAMINOPHEN 650 MG: 325 TABLET, FILM COATED ORAL at 03:57

## 2017-11-08 RX ADMIN — HYDROCODONE BITARTRATE AND ACETAMINOPHEN 0.5 TABLET: 5; 325 TABLET ORAL at 11:26

## 2017-11-08 RX ADMIN — Medication 2.5 MG: at 08:42

## 2017-11-08 RX ADMIN — PANCRELIPASE 10000 UNITS: 5000; 17000; 27000 CAPSULE, DELAYED RELEASE ORAL at 13:01

## 2017-11-08 RX ADMIN — HYDROCODONE BITARTRATE AND ACETAMINOPHEN 0.5 TABLET: 5; 325 TABLET ORAL at 09:42

## 2017-11-08 ASSESSMENT — PAIN DESCRIPTION - DESCRIPTORS: DESCRIPTORS: CONSTANT;SHARP;ACHING

## 2017-11-08 NOTE — PLAN OF CARE
Problem: Patient Care Overview  Goal: Plan of Care/Patient Progress Review  Outcome: Completed Date Met:  11/08/17  [id number] Patient VSS. MD in this morning and discussed concerns regarding patient going home : patient eating an adequate amount of food, pain being manageable, and cause of diarrhea. Throughout course of day, patient eating adequate amounts, reported feeling hungry at lunch time, pain has been controlled by norco, and she reports having a formed stool. MD notified, discharge orders received. Patient and family updated on plan of care.

## 2017-11-08 NOTE — DISCHARGE SUMMARY
[facility]    Discharge Summary  Hospital Medicine    Date of Admission:  11/1/2017  Date of Discharge:  11/8/2017   Discharging Provider: Belen Lord  Date of Service: 11/8/2017     Primary Care     No Ref-Primary, Physician  NO REF-PRIMARY PHYSICIAN       Assessment & Plan            Kitty Bangura is a 56 year old female with no significant PMH who now presented with abdominal pain and new dx of pancreatitis          1.Acute necrotizing pancreatitis with noninfectious SIRS without end organ dysfunction- presented 11/1 with lipase [id number].  abd u/s did not show any obstruction.she was treated conservatively and lipase has resolved. Abd pain has cont though improved .direct bili was  Increasing but now improving.  Bc of this elevated bili and ? Ascites, a peritoneal tap was planned . At U/s there was not enough fluid to tap so then an MRCP was done which did not show any cbd obstruction but did show the severe necrotizing pancreatitis. DR Sanches, a pancreatic specialist at [place] has been following remotely. I spoke with him 11/6 and he said the following: He has been folloing pt remotely and wanted to to dicsuss. He says by CT, pt has severe necrotizing pancreatitis.  She basically has all but her tail of the pancreas involved and this is called disconnected duct.  He said that it is inevitable that she will need and endoscopic intervention, likely 3-4 weeks and if she is doing well could be d/c home with outpt f/u with them but stressed that she needs to f/u. It is ok to advance diet and ok with him to d/c anitbx  . If she can not eat then he recommended tube feeds- diet should be low fat.pt's intake is slowly improving. Change pain med to vicodin bc oxy causing nausea. By d/c patient felt pain controlled  2.Elevated bili (conjugated)  -Bili peaked at 5.3 and now decreasing. CT and US negative for any cholecystitis/cholangitis/obstruction. MRCP neg for obstruction. Suspect due to  pancreatitis/inflammation and that this will normalize with resolution of pancreatitis .sl elevation other lft but likely all related to pancreas inflammation      3. Question of etoh abuse only bc presentation of pancreatitis without clear cause. Nothing else to support this dx.Doubt this   11/1/17 -- Reports drinking 2 glasses of wine daily  On admission, did not appear to be going through withdrawals  - CIWA protocol in place  - thiamine, folic acid, multivitamin, magnesium/phosphorus therapy initiated. (IV given NPO)  11/5/2017 -- CIWAs 0, stopping checks.  Nothing for withdrawal.            4.Leukocytosis with elevated lactic - consistent with SIRS from pancreatitis as above-lactate normal now and wbc improving. mrcp neg for obstruction.d/c antibx 11/7 11/1/17 -- WBC 23.2 on arrival.  Significant stranding seen on CT. Per Dr. Sanches (see above), appears aggressive and suggests that rapid administration of LR would be prudent to avoid/derail the development of necrosis.  Given 3L of LR.  11/2/2017 -- clinical picture improving, WBC down to 12 from 23 on admission without antibiotics, procalcitonin low risk for systemic infection, afebrile, lactic elevated yesterday but normalized with continuing LR at 250/h overnight.  Overall consistent with improving SIRS due to pancreatitis as above, nothing for acute bacterial infection at present.  Continue LR at 250/h for now  11/3/2017 -- WBC now back to 17, afebrile, symptoms improving slowly with no new symptoms.  Doubt acute infection but unusual - will recheck CBC now - if WBC back to > 20 will consider repeating UA, checking chest x-ray and blood cultures and starting empirically on levaquin/flagyl in case she is developing some infected necrosis.    11/4/2017 -- WBC improving again today but still up.  With bili up and picture showing improvement but slowly I think caution is appropriate here although I don't see clear evidence of any acute infection - will  send blood cultures, start empirically on levaquin/flagyl for coverage of pancreas and biliary tree, and check abdominal US as above.  Lactic normal and patient appears adequately volume resuscitated.    11/5/2017 -- procalcitonin from yesterday shows moderate risk of systemic infection - on antibiotics as above - repeat procalcitonin 1.8 CT and US as above.  WBC essentially unchanged at 17, afebrile.  Continue current antibiotics and follow for now.  Per discussion with GI they thought this was reasonable but doubted that WBC was due to infection.         5.pulm-Pleural effusion- likely due to vol overload plus pancreatitis. D/c iv fluid 11/6 and increase lasix to 20 bid. cxr really unchanged this am. cont lasix for now . no real sx and likely the pleural effusion just needs to be resorbed        7.Anemia  11/5/2017 -- mild       8.Hypophosphatemia-on replacement.        9.Hypokalemia  -on replacement       10.Fatty liver  11/1/17 -- Seen on US.  Follow-up with primary care provider as outpatient and with GI as above.    11. Hypokalemia- on replacement  12. New diarhhea-, 11/8. c diff vs malabsorption- check c diff and start pancreatic enzymes. By noon pt reported formed stool  13. Pos bld cx 11/4- diptheroids, likely contaminant    Discharge Disposition   home    Discharge Orders     GASTROENTEROLOGY ADULT REF CONSULT ONLY     Reason for your hospital stay   Acute necrotizing pancreatitis     Adult [facility] Follow-up and recommended labs and tests   Needs appt with Dr Alex Sanches in 1 week    Appointments on [place] and/or [facility] (with [facility] or [facility] provider or service). Call [phone] if you haven't heard regarding these appointments within 7 days of discharge.       Discharge Medications   Current Discharge Medication List      START taking these medications    Details   HYDROcodone-acetaminophen (NORCO) 5-325 MG per tablet Take 0.5-1 tablets by mouth every 6 hours as needed for moderate to  severe pain  Qty: 10 tablet, Refills: 0    Associated Diagnoses: Acute pancreatitis without infection or necrosis, unspecified pancreatitis type; Acute pancreatitis with uninfected necrosis, unspecified pancreatitis type      acetaminophen (TYLENOL) 325 MG tablet Take 2 tablets (650 mg) by mouth every 4 hours as needed for mild pain  Qty: 100 tablet    Associated Diagnoses: Acute pancreatitis without infection or necrosis, unspecified pancreatitis type      amylase-lipase-protease (ZENPEP) 5000 UNITS CPEP Take 2 capsules (10,000 Units) by mouth 3 times daily (with meals)  Qty: 100 capsule, Refills: 0    Associated Diagnoses: Acute pancreatitis without infection or necrosis, unspecified pancreatitis type           Allergies   No Known Allergies    Consultations This Hospital Stay     SPIRITUAL HEALTH SERVICES IP CONSULT  NUTRITION SERVICES ADULT IP CONSULT    Significant Results and Procedures   Procedures        Data   Results for orders placed or performed during the hospital encounter of 11/01/17   POC US ABDOMEN LIMITED    [facility] Procedure Note      Limited Bedside ED Gallbladder  Ultrasound:    PROCEDURE: PERFORMED BY: Dr. Irineo Fuentes  INDICATIONS:  Abdominal Pain  PROBE:  Low frequency convex probe  BODY LOCATION: Abdomen  FINDINGS:   An ultrasound of the gallbladder was performed using longitudinal and transverse views.  Gallstone(s):  Absent  Gallbladder sludge:  Absent  Sonographic Sol's sign:  Absent  Gallbladder wall thickening (greater than 4 mm):  Absent  Pericholecystic fluid: Absent  Common bile duct (dilated if internal diameter greater than 6 mm): 3-4 mm  INTERPRETATION:  The gallbladder evaluation is normal with no gallstones/sludge, no sonographic Sol s sign, no GB wall thickening, no pericholecystic fluid, and without evidence of cholelithiasis or cholecystitis.  IMAGE DOCUMENTATION: Images were archived to PACs system.       CT Abdomen Pelvis w Contrast     Narrative    CT ABDOMEN AND PELVIS WITH CONTRAST 11/1/2017 4:45 PM     HISTORY: Acute severe right-sided abdominal pain.    CONTRAST DOSE: 86 mL Isovue-370.    Radiation dose for this scan was reduced using automated exposure  control, adjustment of the mA and/or kV according to patient size, or  iterative reconstruction technique.    FINDINGS: Retroperitoneal stranding is noted about the pancreatic head  and duodenum. 3 cm mass with marginal calcification is noted within  the pancreatic tail. Hepatic fatty infiltration is noted. The spleen,  adrenal glands, kidneys, and gallbladder appear within normal limits.  There is a normal-appearing appendix. No evidence of bowel obstruction  or pericolonic inflammatory stranding. Mild prominence of the left  colonic wall is likely related to relative decompression. No free  peritoneal fluid or air is demonstrated. Pelvic contents are otherwise  unremarkable.      Impression    IMPRESSION:  1. 3 cm heterogeneous mass within the tail of the pancreas. A few  calcifications are noted along the lateral margin of the mass.  2. Prominent retroperitoneal stranding about the pancreatic head and  duodenum. This is nonspecific, but could be related to superimposed  pancreatitis. There is no apparent free peritoneal air or fluid.  3. Hepatic fatty infiltration--possible etiologies include consumption  of alcohol or excessive carbohydrate intake, especially  sugar/fructose. Metabolic syndrome commonly occurs in combination with  nonalcoholic fatty liver disease. Although often reversible,  nonalcoholic fatty liver disease can progress to steatohepatitis and  cirrhosis.    SACHIN REYES MD   Abdomen US, limited (RUQ only)    Narrative    US ABDOMEN LIMITED 11/1/2017 6:25 PM     HISTORY: Pancreatitis, abdominal pain    COMPARISON: 11/1/2017 CT    FINDINGS: No gallbladder wall thickening or cholelithiasis is  demonstrated. The common bile duct is borderline in caliber. Hepatic  increased  echotexture, suggesting fatty infiltration, limits  penetration of the liver. The pancreatic tail was obscured. The right  kidney appeared within normal limits.      Impression    IMPRESSION: Hepatic fatty infiltration. The tail of the pancreas could  not be visualized.    SACHIN REYES MD   US Abdomen Complete-1    Narrative    US ABDOMEN COMPLETE 11/4/2017 7:47 PM    HISTORY: Pancreatitis.    TECHNIQUE: Routine assessed structures include the gallbladder, bile  ducts, liver, pancreas, kidneys, and spleen size. Also assessed are  the abdominal aorta and hepatic portion of the IVC.    COMPARISON: None.    FINDINGS: The liver is echogenic, with diffuse fatty infiltration and  measures 17.9 cm in diameter. Visualized portions of the pancreas  appear edematous.    The spleen is unremarkable.    Normal gallbladder. No sonographic evidence of gallstones. Normal  gallbladder wall thickness. No sonographic Sol sign.    The kidneys are normal bilaterally, measuring 11.5 cm on right and  11.9 cm on the left.    Visualized aorta and IVC are normal.    Small amount of ascites.      Impression    IMPRESSION:    1. Echogenic liver with diffuse fatty infiltration.  2. The previously noted pancreatic tail mass on prior CT scan  performed on November 1, 2017, is not well appreciated on today's  exam. Visualized portions of pancreas appear edematous.  3. No evidence of cholelithiasis or cholecystitis.      4. Ascites.    JOHN CAREY MD   CT Abd/Pelvis w Contrast*    Narrative    CT ABDOMEN AND PELVIS WITH CONTRAST  11/5/2017 8:37 AM     HISTORY: Acute pancreatitis, elevated bilirubin and WBC.    CONTRAST DOSE:  100 mL Isovue 370    TECHNIQUE:  Radiation dose for this scan was reduced using automated  exposure control, adjustment of the mA and/or kV according to patient  size, or iterative reconstruction technique.    FINDINGS:  There are small to moderate-sized bilateral pleural  effusions, new since 11/1/2017, with adjacent  parenchymal opacity  likely representing relaxation atelectasis. There is increased fluid  attenuation at the junction of the head/body of the pancreas, not well  defined. Adjacent inflammatory stranding is noted. Lesion within the  tail of the pancreas appears unchanged. There is increased stranding  or edema within the mesentery. There is also small to moderate amount  of peritoneal fluid adjacent to the liver/spleen and within the  pelvis, new since the prior scan. Hepatic fatty infiltration is again  noted. There is no evidence of bowel obstruction. No free peritoneal  air.      Impression    IMPRESSION:  1. Pancreatitis. There is increasing fluid attenuation at the junction  of the head/body within the pancreatic substance suggesting phlegmon.  No rim-enhancing pseudocyst is yet visible. There is increasing  retroperitoneal and mesenteric inflammatory stranding. There is also  increasing mild to moderate peritoneal fluid.  2. New mild to moderate bilateral pleural effusions and adjacent lower  lobe atelectasis when compared to 11/1/2017.  3. Hepatic fatty infiltration.    SACHIN REYES MD   Chest 2 Views*    Narrative    CHEST TWO VIEWS   11/6/2017 11:18 AM    HISTORY: Pleural effusion.    COMPARISON: None.      Impression    IMPRESSION: There are small bilateral pleural effusions with probable  mild bibasilar lung atelectasis. No other abnormality is seen.     NICOLAS GALINDO MD   MR Abdomen MRCP w/o & w Contrast    Narrative    MR ABDOMEN MRCP WITHOUT AND WITH CONTRAST 11/6/2017 4:09 PM     HISTORY: Pancreatitis, increasing bilirubin. Evaluate for common bile  duct stone.    TECHNIQUE:  Multisequence, multiplanar imaging is performed through  the biliary system. A total of 10 mL Gadavist is injected  intravenously followed by dynamic axial T1 fat sat sequences.    FINDINGS:     Abdomen: There is diffuse fatty infiltration of the liver on out of  phase T1-weighted images. Spleen is normal in size. Marked  T2  hyperintensity likely representing fluid is surrounding the body of  the pancreas with perihepatic and perisplenic ascites as well. Small  bilateral pleural effusions are also incidentally noted. Findings  would be consistent with patient's known acute pancreatitis. Kidneys  are unremarkable. No hydronephrosis. Adrenal glands are normal in  size. No enlarged lymph nodes.    MRCP sequences show no evidence of intrahepatic or common bile duct  dilatation. No obstructing common bile duct stone is evident. No  obvious gallstones are appreciated. Pancreatic duct appears normal in  caliber and course but is not well visualized in the region of the  pancreatic body.    Following contrast administration, there is a small probable  hemangioma in the posterior right hepatic lobe measuring 1 cm on  series 28, image 32. This is barely visualized on the precontrast T2  images on series 4, image 6. No other enhancing liver lesions are  appreciated. The spleen, adrenal glands and kidneys enhance normally.  Marked inflammation is noted about the pancreatic body with decreased  enhancement in the region of the pancreatic body on series 23, image  50 on the 1 minute postcontrast images. Findings could indicate a  segment of pancreatic necrosis involving the body of the pancreas. No  discrete peripancreatic fluid collections are evident. Splenic vein  appears narrowed but is thought to remain patent.      Impression    IMPRESSION:  1. Acute pancreatitis with possible area of pancreatic necrosis  involving the pancreatic body which does not enhance to a similar  degree as the pancreatic head and tail. No discrete peripancreatic  fluid collections.  2. No evidence of biliary dilatation to suggest an obstructing  gallstone. No obvious stones in the gallbladder and no common bile  duct stones are appreciated. No pancreatic ductal dilatation.  3. Diffuse fatty infiltration of the liver with 1 cm right hepatic  lobe hemangioma.    IRINA  MD TAD   US Abdomen Limited    Narrative    LIMITED ULTRASOUND OF THE ABDOMEN   11/6/2017 11:02 AM    HISTORY: Evaluate for ascites.    COMPARISON: None.      Impression    IMPRESSION: Minimal fluid is seen within the right upper quadrant.  This is not enough to perform a paracentesis.     NICOLAS GALINDO MD   Chest 2 Views*    Narrative    XR CHEST 2 VW 11/7/2017 6:05 AM    HISTORY: Pleural effusion.    COMPARISON: 11/6/2017    FINDINGS: Moderate bilateral pleural effusions. Upper lungs are clear.  No pneumothorax. Stable heart size.      Impression    IMPRESSION: Moderate bilateral pleural effusions, not significantly  changed.    CHRISTINE RED MD     Lab Results   Component Value Date    WBC 14.7 (H) 11/07/2017    HGB 10.3 (L) 11/07/2017    HCT 30.5 (L) 11/07/2017     11/07/2017    CHOL 200 (H) 11/01/2017    TRIG 111 11/07/2017    HDL 59 11/01/2017    ALT 61 (H) 11/08/2017    AST 98 (H) 11/08/2017     11/06/2017    BUN 10 11/06/2017    CO2 29 11/06/2017    INR 1.31 (H) 11/06/2017   k 3.8  Creat 0.58  Pending Results   Unresulted Labs Ordered in the Past 30 Days of this Admission     Date and Time Order Name Status Description    11/8/2017 0858 Clostridium difficile toxin B PCR In process     11/6/2017 1044 Sputum Culture Aerobic Bacterial Preliminary     11/4/2017 1420 Blood culture Preliminary     11/4/2017 1420 Blood culture Preliminary           Physical Exam   Temp:  [98.1  F (36.7  C)-98.8  F (37.1  C)] 98.8  F (37.1  C)  Pulse:  [85] 85  Heart Rate:  [79-85] 85  Resp:  [16-18] 18  BP: (121-136)/(72-87) 136/87  SpO2:  [92 %-95 %] 93 %  Vitals:    11/04/17 0417 11/07/17 0500 11/08/17 0615   Weight: 212 lb 1.3 oz (96.2 kg) 211 lb 13.8 oz (96.1 kg) 206 lb 9.1 oz (93.7 kg)       Constitutional: nad  CV: Regular  Respiratory: CTA bilaterally  GI: Soft,mild tender  Skin: Warm and dry  Trace edema    35 min spent  Belen Lord

## 2017-11-08 NOTE — PLAN OF CARE
Problem: Patient Care Overview  Goal: Plan of Care/Patient Progress Review  Outcome: Improving  Pt's abdominal pain is well controlled with just Tylenol, she has been having several loose BM's.  She has denied having any neusea, and is tolerating a low fat diet.  Will continue to monitor close for changes

## 2017-11-08 NOTE — PROGRESS NOTES
[facility] DISCHARGE NOTE    Patient discharged to home at 1445 PM via wheel chair. Accompanied by spouse and staff. Discharge instructions reviewed with patient and spouse, opportunity offered to ask questions. Prescriptions sent to patients preferred pharmacy. All belongings sent with patient.    Radha Edmond

## 2017-11-08 NOTE — PROGRESS NOTES
Nutrition note    Calorie count requested this am. The patient ate 245 calories for breakfast. Assisted the patient with menu selections for lunch. The patient is aware of need to follow very low fat diet. Returned to visit with patient to determine calories consumed for lunch and the patient had been discharged.    Kianna Bustos RD,LD  Clinical Dietitian

## 2017-11-08 NOTE — PROGRESS NOTES
[facility] Medicine Progress Note  Date of Service: 11/08/2017    Assessment & Plan       Kitty Bangura is a 56 year old female with no significant PMH who now presented with abdominal pain and new dx of pancreatitis          1.Acute pancreatitis- presented 11/1 with lipase 89792.  abd u/s did not show any obstruction.she was treated conservatively and lipase has resolved. Abd pain has cont though improved .direct bili was  Increasing but now improving.  Bc of this elevated bili and ? Ascites, a peritoneal tap was planned . At U/s there was not enough fluid to tap so then an MRCP was done which did not show any cbd obstruction but did show the severe necrotizing pancreatitis. DR Sanches, a pancreatic specialist at [place] has been following remotely. I spoke with him 11/6 and he said the following: He has been folloing pt remotely and wanted to to dicsuss. He says by CT, pt has severe necrotizing pancreatitis.  She basically has all but her tail of the pancreas involved and this is called disconnected duct.  He said that it is inevitable that she will need and endoscopic intervention, likely 3-4 weeks and if she is doing well could be d/c home with outpt f/u with them but stressed that she needs to f/u. It is ok to advance diet and ok with him to d/c anitbx  . If she can not eat then he recommended tube feeds- diet should be low fat.pt's intake is slowly improving. Change pain med to vicodin bc oxy causing nausea  2.Elevated bili (conjugated)  -Bili peaked at 5.3 and now decreasing. CT and US negative for any cholecystitis/cholangitis/obstruction. MRCP neg for obstruction. Suspect due to pancreatitis/inflammation and that this will normalize with resolution of pancreatitis .sl elevation other lft but likely all related to pancreas inflammation     3. Question of etoh abuse only bc presentation of pancreatitis without clear cause. Nothing else to support this dx.Doubt  this   11/1/17 -- Reports drinking 2 glasses of wine daily  On admission, did not appear to be going through withdrawals  - CIWA protocol in place  - thiamine, folic acid, multivitamin, magnesium/phosphorus therapy initiated. (IV given NPO)  11/5/2017 -- CIWAs 0, stopping checks.  Nothing for withdrawal.            4.Leukocytosis with elevated lactic - consistent with SIRS from pancreatitis as above-lactate normal now and wbc improving. mrcp neg for obstruction.d/c antibx 11/7 11/1/17 -- WBC 23.2 on arrival.  Significant stranding seen on CT. Per Dr. Sanches (see above), appears aggressive and suggests that rapid administration of LR would be prudent to avoid/derail the development of necrosis.  Given 3L of LR.  11/2/2017 -- clinical picture improving, WBC down to 12 from 23 on admission without antibiotics, procalcitonin low risk for systemic infection, afebrile, lactic elevated yesterday but normalized with continuing LR at 250/h overnight.  Overall consistent with improving SIRS due to pancreatitis as above, nothing for acute bacterial infection at present.  Continue LR at 250/h for now  11/3/2017 -- WBC now back to 17, afebrile, symptoms improving slowly with no new symptoms.  Doubt acute infection but unusual - will recheck CBC now - if WBC back to > 20 will consider repeating UA, checking chest x-ray and blood cultures and starting empirically on levaquin/flagyl in case she is developing some infected necrosis.    11/4/2017 -- WBC improving again today but still up.  With bili up and picture showing improvement but slowly I think caution is appropriate here although I don't see clear evidence of any acute infection - will send blood cultures, start empirically on levaquin/flagyl for coverage of pancreas and biliary tree, and check abdominal US as above.  Lactic normal and patient appears adequately volume resuscitated.    11/5/2017 -- procalcitonin from yesterday shows moderate risk of systemic infection  - on antibiotics as above - repeat procalcitonin 1.8 CT and US as above.  WBC essentially unchanged at 17, afebrile.  Continue current antibiotics and follow for now.  Per discussion with GI they thought this was reasonable but doubted that WBC was due to infection.        5.pulm-Pleural effusion- likely due to vol overload plus pancreatitis. D/c iv fluid 11/6 and increase lasix to 20 bid. cxr really unchanged this am. cont lasix for now . no real sx and likely the pleural effusion just needs to be resorbed       7.Anemia  11/5/2017 -- mild      8.Hypophosphatemia-on replacement.       9.Hypokalemia  -on replacement      10.Fatty liver  11/1/17 -- Seen on US.  Follow-up with primary care provider as outpatient and with GI as above.    11. Hypokalemia- on replacement  12. New diarhhea- c diff vs malabsorption- check c diff and start pancreatic enzymes  13. Pos bld cx 11/4- diptheroids- likely contaminant      DVT Prophylaxis: Lovenox for DVT, pepcid.    Code Status: Full Code  LinesPIV     Disposition  Improving . Needs to  be able to eat to go home.  reg with low fat and follow for now          Belen Lord       Interval History   abd pain same. Oxy sometimes makes her nauseated. No more sob and cough gone. Less puffy. Eating a little better.dairrhea    Physical Exam   Temp:  [98.1  F (36.7  C)-98.8  F (37.1  C)] 98.8  F (37.1  C)  Pulse:  [85] 85  Heart Rate:  [79-85] 85  Resp:  [16-18] 18  BP: (121-136)/(72-87) 136/87  SpO2:  [92 %-95 %] 93 %    Weights:   Vitals:    11/04/17 0417 11/07/17 0500 11/08/17 0615   Weight: 212 lb 1.3 oz (96.2 kg) 211 lb 13.8 oz (96.1 kg) 206 lb 9.1 oz (93.7 kg)    Body mass index is 34.38 kg/(m^2).    Constitutional: nad  CV: Regular  Respiratory: CTA bilaterally  GI: Soft, minimal diffuse tender  Skin: Warm and dry  Musculoskeletal:trace le edema    Data     Recent Labs  Lab 11/08/17  0505 11/07/17 2000 11/07/17  0620  11/06/17  0545  11/05/17  0540 11/04/17  0545  11/01/17  1547    WBC  --   --  14.7*  --  15.6*  --  17.0* 16.5*  < > 23.2*   HGB  --   --  10.3*  --  10.1*  --  10.4* 11.3*  < > 15.0   MCV  --   --  94  --  96  --  98 100  < > 97   PLT  --   --  245  --  219  --  183 157  < > 313   INR  --   --   --   --  1.31*  --   --   --   --   --    NA  --   --   --   --  138  --  139 138  < > 140   POTASSIUM 3.8 3.6 3.2*  < > 3.2*  < > 3.3* 3.7  < > 3.6   CHLORIDE  --   --   --   --  102  --  106 104  < > 106   CO2  --   --   --   --  29  --  25 28  < > 23   BUN  --   --   --   --  10  --  10 12  < > 18   CR  --   --  0.58  --  0.58  --  0.53 0.59  < > 0.92   ANIONGAP  --   --   --   --  7  --  8 6  < > 11   ILIANA  --   --   --   --  7.5*  --  7.6* 7.4*  < > 8.9   GLC  --   --   --   --  99  --  84 111*  < > 168*   ALBUMIN 1.9*  --  1.9*  --  1.9*  --  2.1*  2.0* 2.2*  < > 4.0   PROTTOTAL 5.2*  --  5.3*  --  5.2*  --  5.3* 5.5*  < > 7.9   BILITOTAL 3.6*  --  5.0*  --  5.3*  --  4.1* 2.5*  < > 0.5   ALKPHOS 99  --  87  --  76  --  67 55  < > 79   ALT 61*  --  41  --  31  --  26 24  < > 37   AST 98*  --  59*  --  51*  --  53* 66*  < > 28   LIPASE  --   --   --   --  116  --  336 1943*  < > 99417*   TROPI  --   --   --   --   --   --   --   --   --  <0.015   < > = values in this interval not displayed.      Recent Labs  Lab 11/06/17  0545 11/05/17  0540 11/04/17  0545 11/03/17  0515 11/02/17  0600 11/01/17  2200   GLC 99 84 111* 162* 189* 185*        Unresulted Labs Ordered in the Past 30 Days of this Admission     Date and Time Order Name Status Description    11/8/2017 0858 Clostridium difficile toxin B PCR In process     11/6/2017 1044 Sputum Culture Aerobic Bacterial Preliminary     11/4/2017 1420 Blood culture Preliminary     11/4/2017 1420 Blood culture Preliminary            Medications        ranitidine  75 mg Oral BID     amylase-lipase-protease  2 capsule Oral TID w/meals     enoxaparin  40 mg Subcutaneous Q24H       Belen Lord

## 2017-11-09 LAB
BACTERIA SPEC CULT: NORMAL
SPECIMEN SOURCE: NORMAL

## 2017-11-10 DIAGNOSIS — K85.91 ACUTE PANCREATITIS WITH UNINFECTED NECROSIS, UNSPECIFIED PANCREATITIS TYPE: ICD-10-CM

## 2017-11-10 DIAGNOSIS — K85.90 ACUTE PANCREATITIS WITHOUT INFECTION OR NECROSIS, UNSPECIFIED PANCREATITIS TYPE: ICD-10-CM

## 2017-11-10 LAB
BACTERIA SPEC CULT: NO GROWTH
Lab: NORMAL
SPECIMEN SOURCE: NORMAL

## 2017-11-10 RX ORDER — HYDROCODONE BITARTRATE AND ACETAMINOPHEN 5; 325 MG/1; MG/1
.5-1 TABLET ORAL EVERY 6 HOURS PRN
Qty: 10 TABLET | Refills: 0 | Status: CANCELLED | OUTPATIENT
Start: 2017-11-10

## 2017-11-10 NOTE — TELEPHONE ENCOUNTER
Last Filled 11/08/17  Last Qty 10  Last Office Visit from discharge order    Thanks,  Kathy Vargas  Certified Pharmacy Technician  [facility]  [phone]

## 2017-11-11 ENCOUNTER — HEALTH MAINTENANCE LETTER (OUTPATIENT)
Age: 57
End: 2017-11-11

## 2017-11-12 LAB
BACTERIA SPEC CULT: ABNORMAL
Lab: ABNORMAL
SPECIMEN SOURCE: ABNORMAL

## 2017-11-13 ENCOUNTER — TELEPHONE (OUTPATIENT)
Dept: GASTROENTEROLOGY | Facility: CLINIC | Age: 57
End: 2017-11-13

## 2017-11-13 NOTE — TELEPHONE ENCOUNTER
Kitty is informed that she is scheduled on 11.20.17 @ 12pm for a CT at the [facility] then a clinic consult with Dr. Sanches at 230 PM at the [facility].  Location of appts confirmed with pt.      11.13.17 @ 3985x

## 2017-11-14 ENCOUNTER — TELEPHONE (OUTPATIENT)
Dept: GASTROENTEROLOGY | Facility: CLINIC | Age: 57
End: 2017-11-14

## 2017-11-14 NOTE — TELEPHONE ENCOUNTER
Spoke with patient reminding of upcoming appointment 11/20 with Dr. Sanches.  She plans to attend. Provided number if needs to reschedule.

## 2017-11-19 ASSESSMENT — ENCOUNTER SYMPTOMS
COUGH: 0
POSTURAL DYSPNEA: 0
DECREASED APPETITE: 0
DYSURIA: 0
COUGH DISTURBING SLEEP: 0
SPUTUM PRODUCTION: 1
POLYDIPSIA: 0
NIGHT SWEATS: 0
CHILLS: 0
NAUSEA: 0
NERVOUS/ANXIOUS: 0
WEIGHT LOSS: 0
HEMATURIA: 0
WHEEZING: 1
BLOATING: 1
WEIGHT GAIN: 1
POLYPHAGIA: 0
SHORTNESS OF BREATH: 1
ABDOMINAL PAIN: 1
JAUNDICE: 1
DYSPNEA ON EXERTION: 1
SNORES LOUDLY: 1
ALTERED TEMPERATURE REGULATION: 1
HALLUCINATIONS: 0
DEPRESSION: 0
BLOOD IN STOOL: 0
FEVER: 0
RECTAL PAIN: 0
BOWEL INCONTINENCE: 0
POOR WOUND HEALING: 0
FATIGUE: 1
INSOMNIA: 1
HEMOPTYSIS: 0
DIARRHEA: 0
INCREASED ENERGY: 1
FLANK PAIN: 0
VOMITING: 0
DIFFICULTY URINATING: 1
SKIN CHANGES: 0
DECREASED CONCENTRATION: 0
CONSTIPATION: 1
NAIL CHANGES: 0
PANIC: 0
HEARTBURN: 1

## 2017-11-20 ENCOUNTER — OFFICE VISIT (OUTPATIENT)
Dept: GASTROENTEROLOGY | Facility: CLINIC | Age: 57
End: 2017-11-20

## 2017-11-20 ENCOUNTER — HOSPITAL ENCOUNTER (OUTPATIENT)
Dept: CT IMAGING | Facility: CLINIC | Age: 57
Discharge: HOME | End: 2017-11-20
Attending: INTERNAL MEDICINE | Admitting: INTERNAL MEDICINE
Payer: COMMERCIAL

## 2017-11-20 VITALS
HEIGHT: 65 IN | HEART RATE: 76 BPM | TEMPERATURE: 98.6 F | BODY MASS INDEX: 30.17 KG/M2 | SYSTOLIC BLOOD PRESSURE: 115 MMHG | OXYGEN SATURATION: 96 % | DIASTOLIC BLOOD PRESSURE: 76 MMHG | WEIGHT: 181.1 LBS

## 2017-11-20 DIAGNOSIS — K85.91 NECROTIZING PANCREATITIS: Primary | ICD-10-CM

## 2017-11-20 DIAGNOSIS — Z71.3 NUTRITIONAL COUNSELING: ICD-10-CM

## 2017-11-20 DIAGNOSIS — K85.90 ACUTE PANCREATITIS: Primary | ICD-10-CM

## 2017-11-20 PROCEDURE — 74178 CT ABD&PLV WO CNTR FLWD CNTR: CPT

## 2017-11-20 PROCEDURE — 25000128 H RX IP 250 OP 636: Performed by: GENERAL PRACTICE

## 2017-11-20 PROCEDURE — 40000141 ZZH STATISTIC PERIPHERAL IV START W/O US GUIDANCE

## 2017-11-20 RX ORDER — IOPAMIDOL 755 MG/ML
126 INJECTION, SOLUTION INTRAVASCULAR ONCE
Status: COMPLETED | OUTPATIENT
Start: 2017-11-20 | End: 2017-11-20

## 2017-11-20 RX ADMIN — IOPAMIDOL 126 ML: 755 INJECTION, SOLUTION INTRAVENOUS at 12:00

## 2017-11-20 ASSESSMENT — PAIN SCALES - GENERAL: PAINLEVEL: NO PAIN (0)

## 2017-11-20 NOTE — PATIENT INSTRUCTIONS
"1. Dietitian referral: Johana Chahal RD.     2. Creon sent to [facility] in [facility]. Take 1-2 with snacks and 2-3 with meals.     3. [facility]US: will call you with date and time. Will need pre-op physical, someone to drive you and someone to stay with you for 24 hours afterwards.     4. ~ Advised to go to the ER if develops:    Fevers over 101 +/- chills,    Significant nausea/vomiting causing inability to take in fluids depleting hydration.    severe pain, ongoing symptoms (pain, nausea) that cannot be controlled with prescribed or OTC medication.    Signs of dehydration (pale skin, low blood pressure, lightheadedness, dark urine, \"sticky\" skin when pinched, rapid heart rate, little to no urine output).        Follow up: as needed.     Please call with any questions or concerns regarding your clinic visit today.    It is a pleasure being involved in your health care.    Contacts post-consultation depending on your need:    Schedule Clinic Appointments       [phone] # 1   M-F 7:30 - 5 pm    Sarah Beth KINNEY RN Coordinator           [phone] # 3   M-F 8:00 - 5 pm  (Triage hours)     Gagan SCALES RN Coordinator               [phone] # 3   M-F 8:00 - 5 pm  (Triage hours)    OR Procedure Scheduling              [phone]    My Chart is available 24 hours a day and is a secure way to access your records and communicate with your care team.  I strongly recommend signing up if you haven't already done so, if you are comfortable with computers.  If you would like to inquire about this or are having problems with My Chart access, you may call [phone] or go online at [email or web address].  Please allow at least 24 hours for a response and extra time on weekends and Holidays.          "

## 2017-11-20 NOTE — NURSING NOTE
"Chief Complaint   Patient presents with     Consult     Post hospital follow up, at [facility]       Vitals:    11/20/17 1404   BP: 115/76   Pulse: 76   Temp: 98.6  F (37  C)   TempSrc: Oral   SpO2: 96%   Weight: 181 lb 1.6 oz   Height: 5' 5\"       Body mass index is 30.14 kg/(m^2).    Asia Perales CMA                  "

## 2017-11-20 NOTE — PROGRESS NOTES
55 yo referred by Dr Belen Lord for FU severe acute pancreatitis onset 11/1, hospitalized at [facility].    Discharge summary below.    No meds prior to onset of AP 11/1. 1-2 glasses of wine per night, no binge. Very compliant, healthy. DId have pancreatic cyst noted on back MRI about 10 years ago, no follow up.     7 days in hospital at [facility].  Now w persistent but improving crampy pain, natalya w eating any fat. On minimal dose (10K units per meal) panc enzymes. Off pain meds x 3 days. No smoking, neg MRCP for stones, neg US, no high TG.     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  2) HOWEVER this likely has no relation to severe acute pancreatitis. Had mostly peripancreatic necrosis on admission CT 11/1. Repeat today not formally read, but has large walled off necrosis around body and head. Probably not disconnected pancreatic duct but gland severely compressed. Hard to tell.     REC:  1) Full dose CREON  2) Dietitian  3) Plan visit w Dr Metz next Monday 11/27, plan endoscopic drainage 11/29 which will be at 4 weeks (ideal minimum).  Dr Metz will review risks/alternatives but unless asymptomatic by then, will likely need drainage  4) Watch carefully for any signs of infection, deterioration - if so seek help at [facility] or our ED.      Vito Sanches MD  Gastroenterology, Pancreas and Biliary Disorders  [facility]                            Discharge Summary  Hospital Medicine     Date of Admission:  11/1/2017  Date of Discharge:  11/8/2017   Discharging Provider: Belen Lord  Date of Service: 11/8/2017      Primary Care                                                          No Ref-Primary, Physician  NO REF-PRIMARY  PHYSICIAN        Assessment & Plan              Kitty Bangura is a 56 year old female with no significant PMH who now presented with abdominal pain and new dx of pancreatitis          1.Acute necrotizing pancreatitis with noninfectious SIRS without end organ dysfunction- presented 11/1 with lipase [id number].  abd u/s did not show any obstruction.she was treated conservatively and lipase has resolved. Abd pain has cont though improved .direct bili was  Increasing but now improving.  Bc of this elevated bili and ? Ascites, a peritoneal tap was planned . At U/s there was not enough fluid to tap so then an MRCP was done which did not show any cbd obstruction but did show the severe necrotizing pancreatitis. DR Sanches, a pancreatic specialist at [place] has been following remotely. I spoke with him 11/6 and he said the following: He has been folloing pt remotely and wanted to to dicsuss. He says by CT, pt has severe necrotizing pancreatitis.  She basically has all but her tail of the pancreas involved and this is called disconnected duct.  He said that it is inevitable that she will need and endoscopic intervention, likely 3-4 weeks and if she is doing well could be d/c home with outpt f/u with them but stressed that she needs to f/u. It is ok to advance diet and ok with him to d/c anitbx  . If she can not eat then he recommended tube feeds- diet should be low fat.pt's intake is slowly improving. Change pain med to vicodin bc oxy causing nausea. By d/c patient felt pain controlled  2.Elevated bili (conjugated)  -Bili peaked at 5.3 and now decreasing. CT and US negative for any cholecystitis/cholangitis/obstruction. MRCP neg for obstruction. Suspect due to pancreatitis/inflammation and that this will normalize with resolution of pancreatitis .sl elevation other lft but likely all related to pancreas inflammation      3. Question of etoh abuse only bc presentation of pancreatitis without clear cause. Nothing else to  support this dx.Doubt this   11/1/17 -- Reports drinking 2 glasses of wine daily  On admission, did not appear to be going through withdrawals  - CIWA protocol in place  - thiamine, folic acid, multivitamin, magnesium/phosphorus therapy initiated. (IV given NPO)  11/5/2017 -- CIWAs 0, stopping checks.  Nothing for withdrawal.            4.Leukocytosis with elevated lactic - consistent with SIRS from pancreatitis as above-lactate normal now and wbc improving. mrcp neg for obstruction.d/c antibx 11/7 11/1/17 -- WBC 23.2 on arrival.  Significant stranding seen on CT. Per Dr. Sanches (see above), appears aggressive and suggests that rapid administration of LR would be prudent to avoid/derail the development of necrosis.  Given 3L of LR.  11/2/2017 -- clinical picture improving, WBC down to 12 from 23 on admission without antibiotics, procalcitonin low risk for systemic infection, afebrile, lactic elevated yesterday but normalized with continuing LR at 250/h overnight.  Overall consistent with improving SIRS due to pancreatitis as above, nothing for acute bacterial infection at present.  Continue LR at 250/h for now  11/3/2017 -- WBC now back to 17, afebrile, symptoms improving slowly with no new symptoms.  Doubt acute infection but unusual - will recheck CBC now - if WBC back to > 20 will consider repeating UA, checking chest x-ray and blood cultures and starting empirically on levaquin/flagyl in case she is developing some infected necrosis.    11/4/2017 -- WBC improving again today but still up.  With bili up and picture showing improvement but slowly I think caution is appropriate here although I don't see clear evidence of any acute infection - will send blood cultures, start empirically on levaquin/flagyl for coverage of pancreas and biliary tree, and check abdominal US as above.  Lactic normal and patient appears adequately volume resuscitated.    11/5/2017 -- procalcitonin from yesterday shows moderate  risk of systemic infection - on antibiotics as above - repeat procalcitonin 1.8 CT and US as above.  WBC essentially unchanged at 17, afebrile.  Continue current antibiotics and follow for now.  Per discussion with GI they thought this was reasonable but doubted that WBC was due to infection.         5.pulm-Pleural effusion- likely due to vol overload plus pancreatitis. D/c iv fluid 11/6 and increase lasix to 20 bid. cxr really unchanged this am. cont lasix for now . no real sx and likely the pleural effusion just needs to be resorbed        7.Anemia  11/5/2017 -- mild       8.Hypophosphatemia-on replacement.        9.Hypokalemia  -on replacement       10.Fatty liver  11/1/17 -- Seen on US.  Follow-up with primary care provider as outpatient and with GI as above.    11. Hypokalemia- on replacement  12. New diarhhea-, 11/8. c diff vs malabsorption- check c diff and start pancreatic enzymes. By noon pt reported formed stool  13. Pos bld cx 11/4- diptheroids, likely contaminant        Discharge Disposition       home        Discharge Orders         GASTROENTEROLOGY ADULT REF CONSULT ONLY      Reason for your hospital stay   Acute necrotizing pancreatitis      Adult [facility] Follow-up and recommended labs and tests   Needs appt with Dr Alex Sanches in 1 week    Appointments on [place] and/or [facility] (with [facility] or [facility] provider or service). Call [phone] if you haven't heard regarding these appointments within 7 days of discharge.          Discharge Medications           Current Discharge Medication List            START taking these medications     Details   HYDROcodone-acetaminophen (NORCO) 5-325 MG per tablet Take 0.5-1 tablets by mouth every 6 hours as needed for moderate to severe pain  Qty: 10 tablet, Refills: 0     Associated Diagnoses: Acute pancreatitis without infection or necrosis, unspecified pancreatitis type; Acute pancreatitis with uninfected necrosis, unspecified pancreatitis type        acetaminophen (TYLENOL) 325 MG tablet Take 2 tablets (650 mg) by mouth every 4 hours as needed for mild pain  Qty: 100 tablet     Associated Diagnoses: Acute pancreatitis without infection or necrosis, unspecified pancreatitis type       amylase-lipase-protease (ZENPEP) 5000 UNITS CPEP Take 2 capsules (10,000 Units) by mouth 3 times daily (with meals)  Qty: 100 capsule, Refills: 0     Associated Diagnoses: Acute pancreatitis without infection or necrosis, unspecified pancreatitis type                 Allergies       No Known Allergies        Consultations This Hospital Stay          [facility] SERVICES IP CONSULT  [facility] ADULT IP CONSULT        Significant Results and Procedures     Procedures             Data          Results for orders placed or performed during the hospital encounter of 11/01/17   POC US ABDOMEN LIMITED     [facility] Procedure Note                            Limited Bedside ED Gallbladder  Ultrasound:     PROCEDURE: PERFORMED BY: Dr. Irineo Fuentes  INDICATIONS:  Abdominal Pain  PROBE:  Low frequency convex probe  BODY LOCATION: Abdomen  FINDINGS:   An ultrasound of the gallbladder was performed using longitudinal and transverse views.  Gallstone(s):  Absent  Gallbladder sludge:  Absent  Sonographic Sol's sign:  Absent  Gallbladder wall thickening (greater than 4 mm):  Absent  Pericholecystic fluid: Absent  Common bile duct (dilated if internal diameter greater than 6 mm): 3-4 mm  INTERPRETATION:  The gallbladder evaluation is normal with no gallstones/sludge, no sonographic Sol s sign, no GB wall thickening, no pericholecystic fluid, and without evidence of cholelithiasis or cholecystitis.  IMAGE DOCUMENTATION: Images were archived to PACs system.      CT Abdomen Pelvis w Contrast     Narrative     CT ABDOMEN AND PELVIS WITH CONTRAST 11/1/2017 4:45 PM      HISTORY: Acute severe right-sided abdominal pain.     CONTRAST DOSE: 86 mL  Isovue-370.     Radiation dose for this scan was reduced using automated exposure  control, adjustment of the mA and/or kV according to patient size, or  iterative reconstruction technique.     FINDINGS: Retroperitoneal stranding is noted about the pancreatic head  and duodenum. 3 cm mass with marginal calcification is noted within  the pancreatic tail. Hepatic fatty infiltration is noted. The spleen,  adrenal glands, kidneys, and gallbladder appear within normal limits.  There is a normal-appearing appendix. No evidence of bowel obstruction  or pericolonic inflammatory stranding. Mild prominence of the left  colonic wall is likely related to relative decompression. No free  peritoneal fluid or air is demonstrated. Pelvic contents are otherwise  unremarkable.     Impression     IMPRESSION:  1. 3 cm heterogeneous mass within the tail of the pancreas. A few  calcifications are noted along the lateral margin of the mass.  2. Prominent retroperitoneal stranding about the pancreatic head and  duodenum. This is nonspecific, but could be related to superimposed  pancreatitis. There is no apparent free peritoneal air or fluid.  3. Hepatic fatty infiltration--possible etiologies include consumption  of alcohol or excessive carbohydrate intake, especially  sugar/fructose. Metabolic syndrome commonly occurs in combination with  nonalcoholic fatty liver disease. Although often reversible,  nonalcoholic fatty liver disease can progress to steatohepatitis and  cirrhosis.     SACHIN REYES MD   Abdomen US, limited (RUQ only)     Narrative     US ABDOMEN LIMITED 11/1/2017 6:25 PM      HISTORY: Pancreatitis, abdominal pain     COMPARISON: 11/1/2017 CT     FINDINGS: No gallbladder wall thickening or cholelithiasis is  demonstrated. The common bile duct is borderline in caliber. Hepatic  increased echotexture, suggesting fatty infiltration, limits  penetration of the liver. The pancreatic tail was obscured. The right  kidney appeared  within normal limits.     Impression     IMPRESSION: Hepatic fatty infiltration. The tail of the pancreas could  not be visualized.     SACHIN REYES MD   US Abdomen Complete-1     Narrative     US ABDOMEN COMPLETE 11/4/2017 7:47 PM     HISTORY: Pancreatitis.     TECHNIQUE: Routine assessed structures include the gallbladder, bile  ducts, liver, pancreas, kidneys, and spleen size. Also assessed are  the abdominal aorta and hepatic portion of the IVC.     COMPARISON: None.     FINDINGS: The liver is echogenic, with diffuse fatty infiltration and  measures 17.9 cm in diameter. Visualized portions of the pancreas  appear edematous.     The spleen is unremarkable.     Normal gallbladder. No sonographic evidence of gallstones. Normal  gallbladder wall thickness. No sonographic Sol sign.     The kidneys are normal bilaterally, measuring 11.5 cm on right and  11.9 cm on the left.     Visualized aorta and IVC are normal.     Small amount of ascites.     Impression     IMPRESSION:    1. Echogenic liver with diffuse fatty infiltration.  2. The previously noted pancreatic tail mass on prior CT scan  performed on November 1, 2017, is not well appreciated on today's  exam. Visualized portions of pancreas appear edematous.  3. No evidence of cholelithiasis or cholecystitis.      4. Ascites.     JOHN CAREY MD   CT Abd/Pelvis w Contrast*     Narrative     CT ABDOMEN AND PELVIS WITH CONTRAST  11/5/2017 8:37 AM      HISTORY: Acute pancreatitis, elevated bilirubin and WBC.     CONTRAST DOSE:  100 mL Isovue 370     TECHNIQUE:  Radiation dose for this scan was reduced using automated  exposure control, adjustment of the mA and/or kV according to patient  size, or iterative reconstruction technique.     FINDINGS:  There are small to moderate-sized bilateral pleural  effusions, new since 11/1/2017, with adjacent parenchymal opacity  likely representing relaxation atelectasis. There is increased fluid  attenuation at the junction of  the head/body of the pancreas, not well  defined. Adjacent inflammatory stranding is noted. Lesion within the  tail of the pancreas appears unchanged. There is increased stranding  or edema within the mesentery. There is also small to moderate amount  of peritoneal fluid adjacent to the liver/spleen and within the  pelvis, new since the prior scan. Hepatic fatty infiltration is again  noted. There is no evidence of bowel obstruction. No free peritoneal  air.     Impression     IMPRESSION:  1. Pancreatitis. There is increasing fluid attenuation at the junction  of the head/body within the pancreatic substance suggesting phlegmon.  No rim-enhancing pseudocyst is yet visible. There is increasing  retroperitoneal and mesenteric inflammatory stranding. There is also  increasing mild to moderate peritoneal fluid.  2. New mild to moderate bilateral pleural effusions and adjacent lower  lobe atelectasis when compared to 11/1/2017.  3. Hepatic fatty infiltration.     SACHIN REYES MD   Chest 2 Views*     Narrative     CHEST TWO VIEWS   11/6/2017 11:18 AM     HISTORY: Pleural effusion.     COMPARISON: None.     Impression     IMPRESSION: There are small bilateral pleural effusions with probable  mild bibasilar lung atelectasis. No other abnormality is seen.      NICOLAS GALINDO MD   MR Abdomen MRCP w/o & w Contrast     Narrative     MR ABDOMEN MRCP WITHOUT AND WITH CONTRAST 11/6/2017 4:09 PM      HISTORY: Pancreatitis, increasing bilirubin. Evaluate for common bile  duct stone.     TECHNIQUE:  Multisequence, multiplanar imaging is performed through  the biliary system. A total of 10 mL Gadavist is injected  intravenously followed by dynamic axial T1 fat sat sequences.     FINDINGS:      Abdomen: There is diffuse fatty infiltration of the liver on out of  phase T1-weighted images. Spleen is normal in size. Marked T2  hyperintensity likely representing fluid is surrounding the body of  the pancreas with perihepatic and  perisplenic ascites as well. Small  bilateral pleural effusions are also incidentally noted. Findings  would be consistent with patient's known acute pancreatitis. Kidneys  are unremarkable. No hydronephrosis. Adrenal glands are normal in  size. No enlarged lymph nodes.     MRCP sequences show no evidence of intrahepatic or common bile duct  dilatation. No obstructing common bile duct stone is evident. No  obvious gallstones are appreciated. Pancreatic duct appears normal in  caliber and course but is not well visualized in the region of the  pancreatic body.     Following contrast administration, there is a small probable  hemangioma in the posterior right hepatic lobe measuring 1 cm on  series 28, image 32. This is barely visualized on the precontrast T2  images on series 4, image 6. No other enhancing liver lesions are  appreciated. The spleen, adrenal glands and kidneys enhance normally.  Marked inflammation is noted about the pancreatic body with decreased  enhancement in the region of the pancreatic body on series 23, image  50 on the 1 minute postcontrast images. Findings could indicate a  segment of pancreatic necrosis involving the body of the pancreas. No  discrete peripancreatic fluid collections are evident. Splenic vein  appears narrowed but is thought to remain patent.     Impression     IMPRESSION:  1. Acute pancreatitis with possible area of pancreatic necrosis  involving the pancreatic body which does not enhance to a similar  degree as the pancreatic head and tail. No discrete peripancreatic  fluid collections.  2. No evidence of biliary dilatation to suggest an obstructing  gallstone. No obvious stones in the gallbladder and no common bile  duct stones are appreciated. No pancreatic ductal dilatation.  3. Diffuse fatty infiltration of the liver with 1 cm right hepatic  lobe hemangioma.     IRINA MISHRA MD   US Abdomen Limited     Narrative     LIMITED ULTRASOUND OF THE ABDOMEN   11/6/2017 11:02  AM     HISTORY: Evaluate for ascites.     COMPARISON: None.     Impression     IMPRESSION: Minimal fluid is seen within the right upper quadrant.  This is not enough to perform a paracentesis.      NICOLAS GALINDO MD   Chest 2 Views*     Narrative     XR CHEST 2 VW 11/7/2017 6:05 AM     HISTORY: Pleural effusion.     COMPARISON: 11/6/2017     FINDINGS: Moderate bilateral pleural effusions. Upper lungs are clear.  No pneumothorax. Stable heart size.     Impression     IMPRESSION: Moderate bilateral pleural effusions, not significantly  changed.     CHRISTINE RED MD            Lab Results   Component Value Date     WBC 14.7 (H) 11/07/2017     HGB 10.3 (L) 11/07/2017     HCT 30.5 (L) 11/07/2017      11/07/2017     CHOL 200 (H) 11/01/2017     TRIG 111 11/07/2017     HDL 59 11/01/2017     ALT 61 (H) 11/08/2017     AST 98 (H) 11/08/2017      11/06/2017     BUN 10 11/06/2017     CO2 29 11/06/2017     INR 1.31 (H) 11/06/2017   k 3.8  Creat 0.58     Pending Results      Unresulted Labs Ordered in the Past 30 Days of this Admission     Date and Time Order Name Status Description     11/8/2017 0858 Clostridium difficile toxin B PCR In process       11/6/2017 1044 Sputum Culture Aerobic Bacterial Preliminary       11/4/2017 1420 Blood culture Preliminary       11/4/2017 1420 Blood culture Preliminary                 Physical Exam      Temp:  [98.1  F (36.7  C)-98.8  F (37.1  C)] 98.8  F (37.1  C)  Pulse:  [85] 85  Heart Rate:  [79-85] 85  Resp:  [16-18] 18  BP: (121-136)/(72-87) 136/87  SpO2:  [92 %-95 %] 93 %        Vitals:     11/04/17 0417 11/07/17 0500 11/08/17 0615   Weight: 212 lb 1.3 oz (96.2 kg) 211 lb 13.8 oz (96.1 kg) 206 lb 9.1 oz (93.7 kg)         Constitutional: nad  CV: Regular  Respiratory: CTA bilaterally  GI: Soft,mild tender  Skin: Warm and dry  Trace edema     35 min spent  Belen Lord

## 2017-11-20 NOTE — LETTER
11/20/2017       RE: Kitty Bangura  [id number] [facility] [address]     Dear Colleague,    Thank you for referring your patient, Kitty Bangura, to the [facility] PANCREAS AND BILIARY at [facility]. Please see a copy of my visit note below.    57 yo referred by Dr Belen Lord for FU severe acute pancreatitis onset 11/1, hospitalized at [facility].    Discharge summary below.    No meds prior to onset of AP 11/1. 1-2 glasses of wine per night, no binge. Very compliant, healthy. DId have pancreatic cyst noted on back MRI about 10 years ago, no follow up.     7 days in hospital at [facility].  Now w persistent but improving crampy pain, natalya w eating any fat. On minimal dose (10K units per meal) panc enzymes. Off pain meds x 3 days. No smoking, neg MRCP for stones, neg US, no high TG.     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  2) HOWEVER this likely has no relation to severe acute pancreatitis. Had mostly peripancreatic necrosis on admission CT 11/1. Repeat today not formally read, but has large walled off necrosis around body and head. Probably not disconnected pancreatic duct but gland severely compressed. Hard to tell.     REC:  1) Full dose CREON  2) Dietitian  3) Plan visit w Dr Metz next Monday 11/27, plan endoscopic drainage 11/29 which will be at 4 weeks (ideal minimum).  Dr Metz will review risks/alternatives but unless asymptomatic by then, will likely need drainage  4) Watch carefully for any signs of infection, deterioration - if so seek help at [facility] or our ED.      Vito Sanches MD  Gastroenterology, Pancreas and Biliary Disorders  [employer]   [place]                            Discharge Summary  Hospital Medicine     Date of Admission:  11/1/2017  Date of Discharge:  11/8/2017   Discharging Provider: Belen Lord  Date of Service: 11/8/2017      Primary Care                                                          No Ref-Primary, Physician  NO REF-PRIMARY PHYSICIAN        Assessment & Plan              Kitty Bangura is a 56 year old female with no significant PMH who now presented with abdominal pain and new dx of pancreatitis          1.Acute necrotizing pancreatitis with noninfectious SIRS without end organ dysfunction- presented 11/1 with lipase 28610.  abd u/s did not show any obstruction.she was treated conservatively and lipase has resolved. Abd pain has cont though improved .direct bili was  Increasing but now improving.  Bc of this elevated bili and ? Ascites, a peritoneal tap was planned . At U/s there was not enough fluid to tap so then an MRCP was done which did not show any cbd obstruction but did show the severe necrotizing pancreatitis. DR Sanches, a pancreatic specialist at [place] has been following remotely. I spoke with him 11/6 and he said the following: He has been folloing pt remotely and wanted to to dicsuss. He says by CT, pt has severe necrotizing pancreatitis.  She basically has all but her tail of the pancreas involved and this is called disconnected duct.  He said that it is inevitable that she will need and endoscopic intervention, likely 3-4 weeks and if she is doing well could be d/c home with outpt f/u with them but stressed that she needs to f/u. It is ok to advance diet and ok with him to d/c anitbx  . If she can not eat then he recommended tube feeds- diet should be low fat.pt's intake is slowly improving. Change pain med to vicodin bc oxy causing nausea. By d/c patient felt pain controlled  2.Elevated bili (conjugated)  -Bili peaked at 5.3 and now decreasing. CT and US negative for any  cholecystitis/cholangitis/obstruction. MRCP neg for obstruction. Suspect due to pancreatitis/inflammation and that this will normalize with resolution of pancreatitis .sl elevation other lft but likely all related to pancreas inflammation      3. Question of etoh abuse only bc presentation of pancreatitis without clear cause. Nothing else to support this dx.Doubt this   11/1/17 -- Reports drinking 2 glasses of wine daily  On admission, did not appear to be going through withdrawals  - CIWA protocol in place  - thiamine, folic acid, multivitamin, magnesium/phosphorus therapy initiated. (IV given NPO)  11/5/2017 -- CIWAs 0, stopping checks.  Nothing for withdrawal.            4.Leukocytosis with elevated lactic - consistent with SIRS from pancreatitis as above-lactate normal now and wbc improving. mrcp neg for obstruction.d/c antibx 11/7 11/1/17 -- WBC 23.2 on arrival.  Significant stranding seen on CT. Per Dr. Sanches (see above), appears aggressive and suggests that rapid administration of LR would be prudent to avoid/derail the development of necrosis.  Given 3L of LR.  11/2/2017 -- clinical picture improving, WBC down to 12 from 23 on admission without antibiotics, procalcitonin low risk for systemic infection, afebrile, lactic elevated yesterday but normalized with continuing LR at 250/h overnight.  Overall consistent with improving SIRS due to pancreatitis as above, nothing for acute bacterial infection at present.  Continue LR at 250/h for now  11/3/2017 -- WBC now back to 17, afebrile, symptoms improving slowly with no new symptoms.  Doubt acute infection but unusual - will recheck CBC now - if WBC back to > 20 will consider repeating UA, checking chest x-ray and blood cultures and starting empirically on levaquin/flagyl in case she is developing some infected necrosis.    11/4/2017 -- WBC improving again today but still up.  With bili up and picture showing improvement but slowly I think caution is  appropriate here although I don't see clear evidence of any acute infection - will send blood cultures, start empirically on levaquin/flagyl for coverage of pancreas and biliary tree, and check abdominal US as above.  Lactic normal and patient appears adequately volume resuscitated.    11/5/2017 -- procalcitonin from yesterday shows moderate risk of systemic infection - on antibiotics as above - repeat procalcitonin 1.8 CT and US as above.  WBC essentially unchanged at 17, afebrile.  Continue current antibiotics and follow for now.  Per discussion with GI they thought this was reasonable but doubted that WBC was due to infection.         5.pulm-Pleural effusion- likely due to vol overload plus pancreatitis. D/c iv fluid 11/6 and increase lasix to 20 bid. cxr really unchanged this am. cont lasix for now . no real sx and likely the pleural effusion just needs to be resorbed        7.Anemia  11/5/2017 -- mild       8.Hypophosphatemia-on replacement.        9.Hypokalemia  -on replacement       10.Fatty liver  11/1/17 -- Seen on US.  Follow-up with primary care provider as outpatient and with GI as above.    11. Hypokalemia- on replacement  12. New diarhhea-, 11/8. c diff vs malabsorption- check c diff and start pancreatic enzymes. By noon pt reported formed stool  13. Pos bld cx 11/4- diptheroids, likely contaminant        Discharge Disposition       home        Discharge Orders         GASTROENTEROLOGY ADULT REF CONSULT ONLY      Reason for your hospital stay   Acute necrotizing pancreatitis      Adult [facility] Follow-up and recommended labs and tests   Needs appt with Dr Alex Sanches in 1 week    Appointments on [place] and/or [facility] (with [facility] or [facility] provider or service). Call [phone] if you haven't heard regarding these appointments within 7 days of discharge.          Discharge Medications           Current Discharge Medication List            START taking these medications     Details    HYDROcodone-acetaminophen (NORCO) 5-325 MG per tablet Take 0.5-1 tablets by mouth every 6 hours as needed for moderate to severe pain  Qty: 10 tablet, Refills: 0     Associated Diagnoses: Acute pancreatitis without infection or necrosis, unspecified pancreatitis type; Acute pancreatitis with uninfected necrosis, unspecified pancreatitis type       acetaminophen (TYLENOL) 325 MG tablet Take 2 tablets (650 mg) by mouth every 4 hours as needed for mild pain  Qty: 100 tablet     Associated Diagnoses: Acute pancreatitis without infection or necrosis, unspecified pancreatitis type       amylase-lipase-protease (ZENPEP) 5000 UNITS CPEP Take 2 capsules (10,000 Units) by mouth 3 times daily (with meals)  Qty: 100 capsule, Refills: 0     Associated Diagnoses: Acute pancreatitis without infection or necrosis, unspecified pancreatitis type                 Allergies       No Known Allergies        Consultations This Hospital Stay          hospitals HEALTH SERVICES IP CONSULT  NUTRITION SERVICES ADULT IP CONSULT        Significant Results and Procedures     Procedures             Data          Results for orders placed or performed during the hospital encounter of 11/01/17   POC US ABDOMEN LIMITED     [facility] Procedure Note                            Limited Bedside ED Gallbladder  Ultrasound:     PROCEDURE: PERFORMED BY: Dr. Irineo Fuentes  INDICATIONS:  Abdominal Pain  PROBE:  Low frequency convex probe  BODY LOCATION: Abdomen  FINDINGS:   An ultrasound of the gallbladder was performed using longitudinal and transverse views.  Gallstone(s):  Absent  Gallbladder sludge:  Absent  Sonographic Sol's sign:  Absent  Gallbladder wall thickening (greater than 4 mm):  Absent  Pericholecystic fluid: Absent  Common bile duct (dilated if internal diameter greater than 6 mm): 3-4 mm  INTERPRETATION:  The gallbladder evaluation is normal with no gallstones/sludge, no sonographic Sol s sign, no GB wall  thickening, no pericholecystic fluid, and without evidence of cholelithiasis or cholecystitis.  IMAGE DOCUMENTATION: Images were archived to PACs system.      CT Abdomen Pelvis w Contrast     Narrative     CT ABDOMEN AND PELVIS WITH CONTRAST 11/1/2017 4:45 PM      HISTORY: Acute severe right-sided abdominal pain.     CONTRAST DOSE: 86 mL Isovue-370.     Radiation dose for this scan was reduced using automated exposure  control, adjustment of the mA and/or kV according to patient size, or  iterative reconstruction technique.     FINDINGS: Retroperitoneal stranding is noted about the pancreatic head  and duodenum. 3 cm mass with marginal calcification is noted within  the pancreatic tail. Hepatic fatty infiltration is noted. The spleen,  adrenal glands, kidneys, and gallbladder appear within normal limits.  There is a normal-appearing appendix. No evidence of bowel obstruction  or pericolonic inflammatory stranding. Mild prominence of the left  colonic wall is likely related to relative decompression. No free  peritoneal fluid or air is demonstrated. Pelvic contents are otherwise  unremarkable.     Impression     IMPRESSION:  1. 3 cm heterogeneous mass within the tail of the pancreas. A few  calcifications are noted along the lateral margin of the mass.  2. Prominent retroperitoneal stranding about the pancreatic head and  duodenum. This is nonspecific, but could be related to superimposed  pancreatitis. There is no apparent free peritoneal air or fluid.  3. Hepatic fatty infiltration--possible etiologies include consumption  of alcohol or excessive carbohydrate intake, especially  sugar/fructose. Metabolic syndrome commonly occurs in combination with  nonalcoholic fatty liver disease. Although often reversible,  nonalcoholic fatty liver disease can progress to steatohepatitis and  cirrhosis.     SACHIN REYES MD   Abdomen US, limited (RUQ only)     Narrative     US ABDOMEN LIMITED 11/1/2017 6:25 PM      HISTORY:  Pancreatitis, abdominal pain     COMPARISON: 11/1/2017 CT     FINDINGS: No gallbladder wall thickening or cholelithiasis is  demonstrated. The common bile duct is borderline in caliber. Hepatic  increased echotexture, suggesting fatty infiltration, limits  penetration of the liver. The pancreatic tail was obscured. The right  kidney appeared within normal limits.     Impression     IMPRESSION: Hepatic fatty infiltration. The tail of the pancreas could  not be visualized.     SACHIN REYES MD   US Abdomen Complete-1     Narrative     US ABDOMEN COMPLETE 11/4/2017 7:47 PM     HISTORY: Pancreatitis.     TECHNIQUE: Routine assessed structures include the gallbladder, bile  ducts, liver, pancreas, kidneys, and spleen size. Also assessed are  the abdominal aorta and hepatic portion of the IVC.     COMPARISON: None.     FINDINGS: The liver is echogenic, with diffuse fatty infiltration and  measures 17.9 cm in diameter. Visualized portions of the pancreas  appear edematous.     The spleen is unremarkable.     Normal gallbladder. No sonographic evidence of gallstones. Normal  gallbladder wall thickness. No sonographic Sol sign.     The kidneys are normal bilaterally, measuring 11.5 cm on right and  11.9 cm on the left.     Visualized aorta and IVC are normal.     Small amount of ascites.     Impression     IMPRESSION:    1. Echogenic liver with diffuse fatty infiltration.  2. The previously noted pancreatic tail mass on prior CT scan  performed on November 1, 2017, is not well appreciated on today's  exam. Visualized portions of pancreas appear edematous.  3. No evidence of cholelithiasis or cholecystitis.      4. Ascites.     JOHN CAREY MD   CT Abd/Pelvis w Contrast*     Narrative     CT ABDOMEN AND PELVIS WITH CONTRAST  11/5/2017 8:37 AM      HISTORY: Acute pancreatitis, elevated bilirubin and WBC.     CONTRAST DOSE:  100 mL Isovue 370     TECHNIQUE:  Radiation dose for this scan was reduced using  automated  exposure control, adjustment of the mA and/or kV according to patient  size, or iterative reconstruction technique.     FINDINGS:  There are small to moderate-sized bilateral pleural  effusions, new since 11/1/2017, with adjacent parenchymal opacity  likely representing relaxation atelectasis. There is increased fluid  attenuation at the junction of the head/body of the pancreas, not well  defined. Adjacent inflammatory stranding is noted. Lesion within the  tail of the pancreas appears unchanged. There is increased stranding  or edema within the mesentery. There is also small to moderate amount  of peritoneal fluid adjacent to the liver/spleen and within the  pelvis, new since the prior scan. Hepatic fatty infiltration is again  noted. There is no evidence of bowel obstruction. No free peritoneal  air.     Impression     IMPRESSION:  1. Pancreatitis. There is increasing fluid attenuation at the junction  of the head/body within the pancreatic substance suggesting phlegmon.  No rim-enhancing pseudocyst is yet visible. There is increasing  retroperitoneal and mesenteric inflammatory stranding. There is also  increasing mild to moderate peritoneal fluid.  2. New mild to moderate bilateral pleural effusions and adjacent lower  lobe atelectasis when compared to 11/1/2017.  3. Hepatic fatty infiltration.     SACHIN REYES MD   Chest 2 Views*     Narrative     CHEST TWO VIEWS   11/6/2017 11:18 AM     HISTORY: Pleural effusion.     COMPARISON: None.     Impression     IMPRESSION: There are small bilateral pleural effusions with probable  mild bibasilar lung atelectasis. No other abnormality is seen.      NICOLAS GALINDO MD   MR Abdomen MRCP w/o & w Contrast     Narrative     MR ABDOMEN MRCP WITHOUT AND WITH CONTRAST 11/6/2017 4:09 PM      HISTORY: Pancreatitis, increasing bilirubin. Evaluate for common bile  duct stone.     TECHNIQUE:  Multisequence, multiplanar imaging is performed through  the biliary  system. A total of 10 mL Gadavist is injected  intravenously followed by dynamic axial T1 fat sat sequences.     FINDINGS:      Abdomen: There is diffuse fatty infiltration of the liver on out of  phase T1-weighted images. Spleen is normal in size. Marked T2  hyperintensity likely representing fluid is surrounding the body of  the pancreas with perihepatic and perisplenic ascites as well. Small  bilateral pleural effusions are also incidentally noted. Findings  would be consistent with patient's known acute pancreatitis. Kidneys  are unremarkable. No hydronephrosis. Adrenal glands are normal in  size. No enlarged lymph nodes.     MRCP sequences show no evidence of intrahepatic or common bile duct  dilatation. No obstructing common bile duct stone is evident. No  obvious gallstones are appreciated. Pancreatic duct appears normal in  caliber and course but is not well visualized in the region of the  pancreatic body.     Following contrast administration, there is a small probable  hemangioma in the posterior right hepatic lobe measuring 1 cm on  series 28, image 32. This is barely visualized on the precontrast T2  images on series 4, image 6. No other enhancing liver lesions are  appreciated. The spleen, adrenal glands and kidneys enhance normally.  Marked inflammation is noted about the pancreatic body with decreased  enhancement in the region of the pancreatic body on series 23, image  50 on the 1 minute postcontrast images. Findings could indicate a  segment of pancreatic necrosis involving the body of the pancreas. No  discrete peripancreatic fluid collections are evident. Splenic vein  appears narrowed but is thought to remain patent.     Impression     IMPRESSION:  1. Acute pancreatitis with possible area of pancreatic necrosis  involving the pancreatic body which does not enhance to a similar  degree as the pancreatic head and tail. No discrete peripancreatic  fluid collections.  2. No evidence of biliary  dilatation to suggest an obstructing  gallstone. No obvious stones in the gallbladder and no common bile  duct stones are appreciated. No pancreatic ductal dilatation.  3. Diffuse fatty infiltration of the liver with 1 cm right hepatic  lobe hemangioma.     IRINA MISHRA MD   US Abdomen Limited     Narrative     LIMITED ULTRASOUND OF THE ABDOMEN   11/6/2017 11:02 AM     HISTORY: Evaluate for ascites.     COMPARISON: None.     Impression     IMPRESSION: Minimal fluid is seen within the right upper quadrant.  This is not enough to perform a paracentesis.      NICOLAS GALINDO MD   Chest 2 Views*     Narrative     XR CHEST 2 VW 11/7/2017 6:05 AM     HISTORY: Pleural effusion.     COMPARISON: 11/6/2017     FINDINGS: Moderate bilateral pleural effusions. Upper lungs are clear.  No pneumothorax. Stable heart size.     Impression     IMPRESSION: Moderate bilateral pleural effusions, not significantly  changed.     CHRISTINE RED MD            Lab Results   Component Value Date     WBC 14.7 (H) 11/07/2017     HGB 10.3 (L) 11/07/2017     HCT 30.5 (L) 11/07/2017      11/07/2017     CHOL 200 (H) 11/01/2017     TRIG 111 11/07/2017     HDL 59 11/01/2017     ALT 61 (H) 11/08/2017     AST 98 (H) 11/08/2017      11/06/2017     BUN 10 11/06/2017     CO2 29 11/06/2017     INR 1.31 (H) 11/06/2017   k 3.8  Creat 0.58     Pending Results      Unresulted Labs Ordered in the Past 30 Days of this Admission     Date and Time Order Name Status Description     11/8/2017 0858 Clostridium difficile toxin B PCR In process       11/6/2017 1044 Sputum Culture Aerobic Bacterial Preliminary       11/4/2017 1420 Blood culture Preliminary       11/4/2017 1420 Blood culture Preliminary                 Physical Exam      Temp:  [98.1  F (36.7  C)-98.8  F (37.1  C)] 98.8  F (37.1  C)  Pulse:  [85] 85  Heart Rate:  [79-85] 85  Resp:  [16-18] 18  BP: (121-136)/(72-87) 136/87  SpO2:  [92 %-95 %] 93 %        Vitals:     11/04/17 0417 11/07/17  0500 11/08/17 0615   Weight: 212 lb 1.3 oz (96.2 kg) 211 lb 13.8 oz (96.1 kg) 206 lb 9.1 oz (93.7 kg)         Constitutional: nad  CV: Regular  Respiratory: CTA bilaterally  GI: Soft,mild tender  Skin: Warm and dry  Trace edema     35 min spent  Belen Lord           Again, thank you for allowing me to participate in the care of your patient.      Sincerely,    Vito Sanches MD

## 2017-11-20 NOTE — MR AVS SNAPSHOT
After Visit Summary   11/20/2017    Kitty Bangura    MRN: [id number]           Patient Information     Date Of Birth          1960        Visit Information        Provider Department      11/20/2017 4:30 PM Johana Chahal RD M [facility]        Care Instructions    It was nice meeting you today:  -Can eat 4-6 smaller meals per day if better tolerated  -Eat a lower fat diet. Start trying to add more variety of lower fat foods to see if tolerated.  -Continue drinking 64 oz of water per day.  -Take enzymes as directed by MD.  If you would like to schedule a follow up appointment with Johana Chahal, Registered Dietitian, please call [phone].            Follow-ups after your visit        Your next 10 appointments already scheduled     Nov 29, 2017   Procedure with González Metz MD   [facility], [place], Same Day Surgery (--)    [address] [address] [phone]              Future tests that were ordered for you today     Open Future Orders        Priority Expected Expires Ordered    ERCP Routine  2/18/2018 11/20/2017            Who to contact     Please call your clinic at [phone] to:    Ask questions about your health    Make or cancel appointments    Discuss your medicines    Learn about your test results    Speak to your doctor   If you have compliments or concerns about an experience at your clinic, or if you wish to file a complaint, please contact [facility] Patient Relations at [phone] or email us at [email or web address]         Additional Information About Your Visit        MyChart Information     [employer] gives you secure access to your electronic health record. If you see a primary care provider, you can also send messages to your care team and make appointments. If you have questions, please call your primary care clinic.  If you do not have a primary care provider, please call [phone]  and they will assist you.      [employer] is an electronic gateway that provides easy, online access to your medical records. With [employer], you can request a clinic appointment, read your test results, renew a prescription or communicate with your care team.     To access your existing account, please contact your [facility] or call [phone] for assistance.        Care EveryWhere ID     This is your Care EveryWhere ID. This could be used by other organizations to access your [place] medical records  [id number]         Blood Pressure from Last 3 Encounters:   11/20/17 115/76   11/08/17 136/87    Weight from Last 3 Encounters:   11/20/17 82.1 kg (181 lb 1.6 oz)   11/08/17 93.7 kg (206 lb 9.1 oz)              Today, you had the following     No orders found for display         Today's Medication Changes          These changes are accurate as of: 11/20/17  4:45 PM.  If you have any questions, ask your nurse or doctor.               These medicines have changed or have updated prescriptions.        Dose/Directions    * amylase-lipase-protease 5000 UNITS Cpep   Commonly known as:  ZENPEP   This may have changed:  Another medication with the same name was added. Make sure you understand how and when to take each.   Used for:  Acute pancreatitis without infection or necrosis, unspecified pancreatitis type        Dose:  2 capsule   Take 2 capsules (10,000 Units) by mouth 3 times daily (with meals)   Quantity:  100 capsule   Refills:  0       * amylase-lipase-protease 64320-27626 UNITS Cpep per EC capsule   Commonly known as:  CREON   This may have changed:  You were already taking a medication with the same name, and this prescription was added. Make sure you understand how and when to take each.   Used for:  Necrotizing pancreatitis   Changed by:  Vito Sanches MD        Take 2-3 with meals / 1-2 with snacks, up to 15 per day.   Quantity:  450 capsule   Refills:  6       * Notice:   This list has 2 medication(s) that are the same as other medications prescribed for you. Read the directions carefully, and ask your doctor or other care provider to review them with you.         Where to get your medicines      These medications were sent to [id number] - [place], [place] - [address]  [address] [address]     Phone:  [phone]     amylase-lipase-protease 07886-[address]1535 UNITS Cpep per EC capsule                Primary Care Provider    Physician No Ref-Primary       NO REF-PRIMARY PHYSICIAN        Equal Access to Services     CHASE DE GUZMAN : Hadii aad ku hadasho Soomaali, waaxda luqadaha, qaybta kaalmada adeegyada, waxay juniorin arun gerardo . So [facility] [phone].    ATENCIÓN: Si habla español, tiene a quiros disposición servicios gratuitos de asistencia lingüística. [facility] [phone].    We comply with applicable federal civil rights laws and [place] laws. We do not discriminate on the basis of race, color, national origin, age, disability, sex, sexual orientation, or gender identity.            Thank you!     Thank you for choosing [facility]  for your care. Our goal is always to provide you with excellent care. Hearing back from our patients is one way we can continue to improve our services. Please take a few minutes to complete the written survey that you may receive in the mail after your visit with us. Thank you!             Your Updated Medication List - Protect others around you: Learn how to safely use, store and throw away your medicines at www.disposemymeds.org.          This list is accurate as of: 11/20/17  4:45 PM.  Always use your most recent med list.                   Brand Name Dispense Instructions for use Diagnosis    acetaminophen 325 MG tablet    TYLENOL    100 tablet    Take 2 tablets (650 mg) by mouth every 4 hours as needed for mild pain    Acute pancreatitis without infection or  necrosis, unspecified pancreatitis type       * amylase-lipase-protease 5000 UNITS Cpep    ZENPEP    100 capsule    Take 2 capsules (10,000 Units) by mouth 3 times daily (with meals)    Acute pancreatitis without infection or necrosis, unspecified pancreatitis type       * amylase-lipase-protease 65581-01410 UNITS Cpep per EC capsule    CREON    450 capsule    Take 2-3 with meals / 1-2 with snacks, up to 15 per day.    Necrotizing pancreatitis       HYDROcodone-acetaminophen 5-325 MG per tablet    NORCO    10 tablet    Take 0.5-1 tablets by mouth every 6 hours as needed for moderate to severe pain    Acute pancreatitis without infection or necrosis, unspecified pancreatitis type, Acute pancreatitis with uninfected necrosis, unspecified pancreatitis type       * Notice:  This list has 2 medication(s) that are the same as other medications prescribed for you. Read the directions carefully, and ask your doctor or other care provider to review them with you.

## 2017-11-20 NOTE — Clinical Note
11/20/2017       RE: Kitty Bangura  [address] [facility] [address]     Dear Colleague,    Thank you for referring your patient, Kitty Bangura, to the [facility] PANCREAS AND BIL[facility] at [facility]. Please see a copy of my visit note below.    57 yo referred by Dr Belen Lord for FU severe acute pancreatitis onset 11/1, hospitalized at [facility].    Discharge summary below.    No meds prior to onset of AP 11/1. 1-2 glasses of wine per night, no binge. Very compliant, healthy. DId have pancreatic cyst noted on back MRI about 10 years ago, no follow up.     7 days in hospital at [facility].  Now w persistent but improving crampy pain, natalya w eating any fat. On minimal dose (10K units per meal) panc enzymes. Off pain meds x 3 days. No smoking, neg MRCP for stones, neg US, no high TG.     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  2) HOWEVER this likely has no relation to severe acute pancreatitis. Had mostly peripancreatic necrosis on admission CT 11/1. Repeat today not formally read, but has large walled off necrosis around body and head. Probably not disconnected pancreatic duct but gland severely compressed. Hard to tell.     REC:  1) Full dose CREON  2) Dietitian  3) Plan visit w Dr Metz next Monday 11/27, plan endoscopic drainage 11/29 which will be at 4 weeks (ideal minimum).  Dr Metz will review risks/alternatives but unless asymptomatic by then, will likely need drainage  4) Watch carefully for any signs of infection, deterioration - if so seek help at [facility] or our ED.      Vito Sanches MD  Gastroenterology, Pancreas and Biliary Disorders  [employer]   [place]                            Discharge Summary  Hospital Medicine     Date of Admission:  11/1/2017  Date of Discharge:  11/8/2017   Discharging Provider: Belen Lord  Date of Service: 11/8/2017      Primary Care                                                          No Ref-Primary, Physician  NO REF-PRIMARY PHYSICIAN        Assessment & Plan              Kitty Bangura is a 56 year old female with no significant PMH who now presented with abdominal pain and new dx of pancreatitis          1.Acute necrotizing pancreatitis with noninfectious SIRS without end organ dysfunction- presented 11/1 with lipase [id number].  abd u/s did not show any obstruction.she was treated conservatively and lipase has resolved. Abd pain has cont though improved .direct bili was  Increasing but now improving.  Bc of this elevated bili and ? Ascites, a peritoneal tap was planned . At U/s there was not enough fluid to tap so then an MRCP was done which did not show any cbd obstruction but did show the severe necrotizing pancreatitis. DR Sanches, a pancreatic specialist at [place] has been following remotely. I spoke with him 11/6 and he said the following: He has been folloing pt remotely and wanted to to dicsuss. He says by CT, pt has severe necrotizing pancreatitis.  She basically has all but her tail of the pancreas involved and this is called disconnected duct.  He said that it is inevitable that she will need and endoscopic intervention, likely 3-4 weeks and if she is doing well could be d/c home with outpt f/u with them but stressed that she needs to f/u. It is ok to advance diet and ok with him to d/c anitbx  . If she can not eat then he recommended tube feeds- diet should be low fat.pt's intake is slowly improving. Change pain med to vicodin bc oxy causing nausea. By d/c patient felt pain controlled  2.Elevated bili (conjugated)  -Bili peaked at 5.3 and now decreasing. CT and US negative for any  cholecystitis/cholangitis/obstruction. MRCP neg for obstruction. Suspect due to pancreatitis/inflammation and that this will normalize with resolution of pancreatitis .sl elevation other lft but likely all related to pancreas inflammation      3. Question of etoh abuse only bc presentation of pancreatitis without clear cause. Nothing else to support this dx.Doubt this   11/1/17 -- Reports drinking 2 glasses of wine daily  On admission, did not appear to be going through withdrawals  - CIWA protocol in place  - thiamine, folic acid, multivitamin, magnesium/phosphorus therapy initiated. (IV given NPO)  11/5/2017 -- CIWAs 0, stopping checks.  Nothing for withdrawal.            4.Leukocytosis with elevated lactic - consistent with SIRS from pancreatitis as above-lactate normal now and wbc improving. mrcp neg for obstruction.d/c antibx 11/7 11/1/17 -- WBC 23.2 on arrival.  Significant stranding seen on CT. Per Dr. Sanches (see above), appears aggressive and suggests that rapid administration of LR would be prudent to avoid/derail the development of necrosis.  Given 3L of LR.  11/2/2017 -- clinical picture improving, WBC down to 12 from 23 on admission without antibiotics, procalcitonin low risk for systemic infection, afebrile, lactic elevated yesterday but normalized with continuing LR at 250/h overnight.  Overall consistent with improving SIRS due to pancreatitis as above, nothing for acute bacterial infection at present.  Continue LR at 250/h for now  11/3/2017 -- WBC now back to 17, afebrile, symptoms improving slowly with no new symptoms.  Doubt acute infection but unusual - will recheck CBC now - if WBC back to > 20 will consider repeating UA, checking chest x-ray and blood cultures and starting empirically on levaquin/flagyl in case she is developing some infected necrosis.    11/4/2017 -- WBC improving again today but still up.  With bili up and picture showing improvement but slowly I think caution is  appropriate here although I don't see clear evidence of any acute infection - will send blood cultures, start empirically on levaquin/flagyl for coverage of pancreas and biliary tree, and check abdominal US as above.  Lactic normal and patient appears adequately volume resuscitated.    11/5/2017 -- procalcitonin from yesterday shows moderate risk of systemic infection - on antibiotics as above - repeat procalcitonin 1.8 CT and US as above.  WBC essentially unchanged at 17, afebrile.  Continue current antibiotics and follow for now.  Per discussion with GI they thought this was reasonable but doubted that WBC was due to infection.         5.pulm-Pleural effusion- likely due to vol overload plus pancreatitis. D/c iv fluid 11/6 and increase lasix to 20 bid. cxr really unchanged this am. cont lasix for now . no real sx and likely the pleural effusion just needs to be resorbed        7.Anemia  11/5/2017 -- mild       8.Hypophosphatemia-on replacement.        9.Hypokalemia  -on replacement       10.Fatty liver  11/1/17 -- Seen on US.  Follow-up with primary care provider as outpatient and with GI as above.    11. Hypokalemia- on replacement  12. New diarhhea-, 11/8. c diff vs malabsorption- check c diff and start pancreatic enzymes. By noon pt reported formed stool  13. Pos bld cx 11/4- diptheroids, likely contaminant        Discharge Disposition       home        Discharge Orders         GASTROENTEROLOGY ADULT REF CONSULT ONLY      Reason for your hospital stay   Acute necrotizing pancreatitis      Adult [facility] Follow-up and recommended labs and tests   Needs appt with Dr Alex Sanches in 1 week    Appointments on [place] and/or [facility] (with [facility] or [facility] provider or service). Call [phone] if you haven't heard regarding these appointments within 7 days of discharge.          Discharge Medications           Current Discharge Medication List            START taking these medications     Details    HYDROcodone-acetaminophen (NORCO) 5-325 MG per tablet Take 0.5-1 tablets by mouth every 6 hours as needed for moderate to severe pain  Qty: 10 tablet, Refills: 0     Associated Diagnoses: Acute pancreatitis without infection or necrosis, unspecified pancreatitis type; Acute pancreatitis with uninfected necrosis, unspecified pancreatitis type       acetaminophen (TYLENOL) 325 MG tablet Take 2 tablets (650 mg) by mouth every 4 hours as needed for mild pain  Qty: 100 tablet     Associated Diagnoses: Acute pancreatitis without infection or necrosis, unspecified pancreatitis type       amylase-lipase-protease (ZENPEP) 5000 UNITS CPEP Take 2 capsules (10,000 Units) by mouth 3 times daily (with meals)  Qty: 100 capsule, Refills: 0     Associated Diagnoses: Acute pancreatitis without infection or necrosis, unspecified pancreatitis type                 Allergies       No Known Allergies        Consultations This Hospital Stay          [facility] HEALTH SERVICES IP CONSULT  [facility]TR[facility] ADULT IP CONSULT        Significant Results and Procedures     Procedures             Data          Results for orders placed or performed during the hospital encounter of 11/01/17   POC US ABDOMEN LIMITED     [facility] Procedure Note                            Limited Bedside ED Gallbladder  Ultrasound:     PROCEDURE: PERFORMED BY: Dr. Irineo Fuentes  INDICATIONS:  Abdominal Pain  PROBE:  Low frequency convex probe  BODY LOCATION: Abdomen  FINDINGS:   An ultrasound of the gallbladder was performed using longitudinal and transverse views.  Gallstone(s):  Absent  Gallbladder sludge:  Absent  Sonographic Sol's sign:  Absent  Gallbladder wall thickening (greater than 4 mm):  Absent  Pericholecystic fluid: Absent  Common bile duct (dilated if internal diameter greater than 6 mm): 3-4 mm  INTERPRETATION:  The gallbladder evaluation is normal with no gallstones/sludge, no sonographic Sol s sign, no GB wall  thickening, no pericholecystic fluid, and without evidence of cholelithiasis or cholecystitis.  IMAGE DOCUMENTATION: Images were archived to PACs system.      CT Abdomen Pelvis w Contrast     Narrative     CT ABDOMEN AND PELVIS WITH CONTRAST 11/1/2017 4:45 PM      HISTORY: Acute severe right-sided abdominal pain.     CONTRAST DOSE: 86 mL Isovue-370.     Radiation dose for this scan was reduced using automated exposure  control, adjustment of the mA and/or kV according to patient size, or  iterative reconstruction technique.     FINDINGS: Retroperitoneal stranding is noted about the pancreatic head  and duodenum. 3 cm mass with marginal calcification is noted within  the pancreatic tail. Hepatic fatty infiltration is noted. The spleen,  adrenal glands, kidneys, and gallbladder appear within normal limits.  There is a normal-appearing appendix. No evidence of bowel obstruction  or pericolonic inflammatory stranding. Mild prominence of the left  colonic wall is likely related to relative decompression. No free  peritoneal fluid or air is demonstrated. Pelvic contents are otherwise  unremarkable.     Impression     IMPRESSION:  1. 3 cm heterogeneous mass within the tail of the pancreas. A few  calcifications are noted along the lateral margin of the mass.  2. Prominent retroperitoneal stranding about the pancreatic head and  duodenum. This is nonspecific, but could be related to superimposed  pancreatitis. There is no apparent free peritoneal air or fluid.  3. Hepatic fatty infiltration--possible etiologies include consumption  of alcohol or excessive carbohydrate intake, especially  sugar/fructose. Metabolic syndrome commonly occurs in combination with  nonalcoholic fatty liver disease. Although often reversible,  nonalcoholic fatty liver disease can progress to steatohepatitis and  cirrhosis.     SACHIN REYES MD   Abdomen US, limited (RUQ only)     Narrative     US ABDOMEN LIMITED 11/1/2017 6:25 PM      HISTORY:  Pancreatitis, abdominal pain     COMPARISON: 11/1/2017 CT     FINDINGS: No gallbladder wall thickening or cholelithiasis is  demonstrated. The common bile duct is borderline in caliber. Hepatic  increased echotexture, suggesting fatty infiltration, limits  penetration of the liver. The pancreatic tail was obscured. The right  kidney appeared within normal limits.     Impression     IMPRESSION: Hepatic fatty infiltration. The tail of the pancreas could  not be visualized.     SACHIN REYES MD   US Abdomen Complete-1     Narrative     US ABDOMEN COMPLETE 11/4/2017 7:47 PM     HISTORY: Pancreatitis.     TECHNIQUE: Routine assessed structures include the gallbladder, bile  ducts, liver, pancreas, kidneys, and spleen size. Also assessed are  the abdominal aorta and hepatic portion of the IVC.     COMPARISON: None.     FINDINGS: The liver is echogenic, with diffuse fatty infiltration and  measures 17.9 cm in diameter. Visualized portions of the pancreas  appear edematous.     The spleen is unremarkable.     Normal gallbladder. No sonographic evidence of gallstones. Normal  gallbladder wall thickness. No sonographic Sol sign.     The kidneys are normal bilaterally, measuring 11.5 cm on right and  11.9 cm on the left.     Visualized aorta and IVC are normal.     Small amount of ascites.     Impression     IMPRESSION:    1. Echogenic liver with diffuse fatty infiltration.  2. The previously noted pancreatic tail mass on prior CT scan  performed on November 1, 2017, is not well appreciated on today's  exam. Visualized portions of pancreas appear edematous.  3. No evidence of cholelithiasis or cholecystitis.      4. Ascites.     JOHN CAREY MD   CT Abd/Pelvis w Contrast*     Narrative     CT ABDOMEN AND PELVIS WITH CONTRAST  11/5/2017 8:37 AM      HISTORY: Acute pancreatitis, elevated bilirubin and WBC.     CONTRAST DOSE:  100 mL Isovue 370     TECHNIQUE:  Radiation dose for this scan was reduced using  automated  exposure control, adjustment of the mA and/or kV according to patient  size, or iterative reconstruction technique.     FINDINGS:  There are small to moderate-sized bilateral pleural  effusions, new since 11/1/2017, with adjacent parenchymal opacity  likely representing relaxation atelectasis. There is increased fluid  attenuation at the junction of the head/body of the pancreas, not well  defined. Adjacent inflammatory stranding is noted. Lesion within the  tail of the pancreas appears unchanged. There is increased stranding  or edema within the mesentery. There is also small to moderate amount  of peritoneal fluid adjacent to the liver/spleen and within the  pelvis, new since the prior scan. Hepatic fatty infiltration is again  noted. There is no evidence of bowel obstruction. No free peritoneal  air.     Impression     IMPRESSION:  1. Pancreatitis. There is increasing fluid attenuation at the junction  of the head/body within the pancreatic substance suggesting phlegmon.  No rim-enhancing pseudocyst is yet visible. There is increasing  retroperitoneal and mesenteric inflammatory stranding. There is also  increasing mild to moderate peritoneal fluid.  2. New mild to moderate bilateral pleural effusions and adjacent lower  lobe atelectasis when compared to 11/1/2017.  3. Hepatic fatty infiltration.     SACHIN REYES MD   Chest 2 Views*     Narrative     CHEST TWO VIEWS   11/6/2017 11:18 AM     HISTORY: Pleural effusion.     COMPARISON: None.     Impression     IMPRESSION: There are small bilateral pleural effusions with probable  mild bibasilar lung atelectasis. No other abnormality is seen.      NICOLAS GALINDO MD   MR Abdomen MRCP w/o & w Contrast     Narrative     MR ABDOMEN MRCP WITHOUT AND WITH CONTRAST 11/6/2017 4:09 PM      HISTORY: Pancreatitis, increasing bilirubin. Evaluate for common bile  duct stone.     TECHNIQUE:  Multisequence, multiplanar imaging is performed through  the biliary  system. A total of 10 mL Gadavist is injected  intravenously followed by dynamic axial T1 fat sat sequences.     FINDINGS:      Abdomen: There is diffuse fatty infiltration of the liver on out of  phase T1-weighted images. Spleen is normal in size. Marked T2  hyperintensity likely representing fluid is surrounding the body of  the pancreas with perihepatic and perisplenic ascites as well. Small  bilateral pleural effusions are also incidentally noted. Findings  would be consistent with patient's known acute pancreatitis. Kidneys  are unremarkable. No hydronephrosis. Adrenal glands are normal in  size. No enlarged lymph nodes.     MRCP sequences show no evidence of intrahepatic or common bile duct  dilatation. No obstructing common bile duct stone is evident. No  obvious gallstones are appreciated. Pancreatic duct appears normal in  caliber and course but is not well visualized in the region of the  pancreatic body.     Following contrast administration, there is a small probable  hemangioma in the posterior right hepatic lobe measuring 1 cm on  series 28, image 32. This is barely visualized on the precontrast T2  images on series 4, image 6. No other enhancing liver lesions are  appreciated. The spleen, adrenal glands and kidneys enhance normally.  Marked inflammation is noted about the pancreatic body with decreased  enhancement in the region of the pancreatic body on series 23, image  50 on the 1 minute postcontrast images. Findings could indicate a  segment of pancreatic necrosis involving the body of the pancreas. No  discrete peripancreatic fluid collections are evident. Splenic vein  appears narrowed but is thought to remain patent.     Impression     IMPRESSION:  1. Acute pancreatitis with possible area of pancreatic necrosis  involving the pancreatic body which does not enhance to a similar  degree as the pancreatic head and tail. No discrete peripancreatic  fluid collections.  2. No evidence of biliary  dilatation to suggest an obstructing  gallstone. No obvious stones in the gallbladder and no common bile  duct stones are appreciated. No pancreatic ductal dilatation.  3. Diffuse fatty infiltration of the liver with 1 cm right hepatic  lobe hemangioma.     IRINA MISHRA MD   US Abdomen Limited     Narrative     LIMITED ULTRASOUND OF THE ABDOMEN   11/6/2017 11:02 AM     HISTORY: Evaluate for ascites.     COMPARISON: None.     Impression     IMPRESSION: Minimal fluid is seen within the right upper quadrant.  This is not enough to perform a paracentesis.      NICOLAS GALINDO MD   Chest 2 Views*     Narrative     XR CHEST 2 VW 11/7/2017 6:05 AM     HISTORY: Pleural effusion.     COMPARISON: 11/6/2017     FINDINGS: Moderate bilateral pleural effusions. Upper lungs are clear.  No pneumothorax. Stable heart size.     Impression     IMPRESSION: Moderate bilateral pleural effusions, not significantly  changed.     CHRISTINE RED MD            Lab Results   Component Value Date     WBC 14.7 (H) 11/07/2017     HGB 10.3 (L) 11/07/2017     HCT 30.5 (L) 11/07/2017      11/07/2017     CHOL 200 (H) 11/01/2017     TRIG 111 11/07/2017     HDL 59 11/01/2017     ALT 61 (H) 11/08/2017     AST 98 (H) 11/08/2017      11/06/2017     BUN 10 11/06/2017     CO2 29 11/06/2017     INR 1.31 (H) 11/06/2017   k 3.8  Creat 0.58     Pending Results      Unresulted Labs Ordered in the Past 30 Days of this Admission     Date and Time Order Name Status Description     11/8/2017 0858 Clostridium difficile toxin B PCR In process       11/6/2017 1044 Sputum Culture Aerobic Bacterial Preliminary       11/4/2017 1420 Blood culture Preliminary       11/4/2017 1420 Blood culture Preliminary                 Physical Exam      Temp:  [98.1  F (36.7  C)-98.8  F (37.1  C)] 98.8  F (37.1  C)  Pulse:  [85] 85  Heart Rate:  [79-85] 85  Resp:  [16-18] 18  BP: (121-136)/(72-87) 136/87  SpO2:  [92 %-95 %] 93 %        Vitals:     11/04/17 0417 11/07/17  0500 11/08/17 0615   Weight: 212 lb 1.3 oz (96.2 kg) 211 lb 13.8 oz (96.1 kg) 206 lb 9.1 oz (93.7 kg)         Constitutional: nad  CV: Regular  Respiratory: CTA bilaterally  GI: Soft,mild tender  Skin: Warm and dry  Trace edema     35 min spent  Belen Lord           Again, thank you for allowing me to participate in the care of your patient.      Sincerely,    Vito Sanches MD

## 2017-11-20 NOTE — MR AVS SNAPSHOT
"              After Visit Summary   11/20/2017    Kitty Bangura    MRN: [id number]           Patient Information     Date Of Birth          1960        Visit Information        Provider Department      11/20/2017 2:30 PM Vito Sanches MD [facility] Pancreas and Biliary        Today's Diagnoses     Necrotizing pancreatitis    -  1      Care Instructions    1. Dietitian referral: Johana Chahal RD.     2. Creon sent to [facility] in [facility]. Take 1-2 with snacks and 2-3 with meals.     3. EUS: will call you with date and time. Will need pre-op physical, someone to drive you and someone to stay with you for 24 hours afterwards.     4. ~ Advised to go to the ER if develops:    Fevers over 101 +/- chills,    Significant nausea/vomiting causing inability to take in fluids depleting hydration.    severe pain, ongoing symptoms (pain, nausea) that cannot be controlled with prescribed or OTC medication.    Signs of dehydration (pale skin, low blood pressure, lightheadedness, dark urine, \"sticky\" skin when pinched, rapid heart rate, little to no urine output).        Follow up: as needed.     Please call with any questions or concerns regarding your clinic visit today.    It is a pleasure being involved in your health care.    Contacts post-consultation depending on your need:    Schedule Clinic Appointments       [phone] # 1   M-F 7:30 - 5 pm    Sarah Beth KINNEY RN Coordinator           [phone] # 3   M-F 8:00 - 5 pm  (Triage hours)     Gagan SCALES RN Coordinator               [phone] # 3   M-F 8:00 - 5 pm  (Triage hours)    OR Procedure Scheduling              [phone]    My Chart is available 24 hours a day and is a secure way to access your records and communicate with your care team.  I strongly recommend signing up if you haven't already done so, if you are comfortable with computers.  If you would like to inquire about this or are having problems with My Chart access, you may call [phone] or go " "online at [email or web address].  Please allow at least 24 hours for a response and extra time on weekends and Holidays.                  Follow-ups after your visit        Additional Services     NUTRITION REFERRAL       Johana Chahal RD                  Future tests that were ordered for you today     Open Future Orders        Priority Expected Expires Ordered    ERCP Routine  2/18/2018 11/20/2017            Who to contact     Please call your clinic at [phone] to:    Ask questions about your health    Make or cancel appointments    Discuss your medicines    Learn about your test results    Speak to your doctor   If you have compliments or concerns about an experience at your clinic, or if you wish to file a complaint, please contact [facility] Patient Relations at [phone] or email us at [email or web address]         Additional Information About Your Visit        [employer] Information     [employer] gives you secure access to your electronic health record. If you see a primary care provider, you can also send messages to your care team and make appointments. If you have questions, please call your primary care clinic.  If you do not have a primary care provider, please call [phone] and they will assist you.      [employer] is an electronic gateway that provides easy, online access to your medical records. With [employer], you can request a clinic appointment, read your test results, renew a prescription or communicate with your care team.     To access your existing account, please contact your [facility] or call [phone] for assistance.        Care EveryWhere ID     This is your Care EveryWhere ID. This could be used by other organizations to access your [facility] medical records  [id number]        Your Vitals Were     Pulse Temperature Height Pulse Oximetry BMI (Body Mass Index)       76 98.6  F (37  C) (Oral) 1.651 m (5' 5\") 96% " 30.14 kg/m2        Blood Pressure from Last 3 Encounters:   11/20/17 115/76   11/08/17 136/87    Weight from Last 3 Encounters:   11/20/17 82.1 kg (181 lb 1.6 oz)   11/08/17 93.7 kg (206 lb 9.1 oz)              We Performed the Following     NUTRITION REFERRAL          Today's Medication Changes          These changes are accurate as of: 11/20/17  3:55 PM.  If you have any questions, ask your nurse or doctor.               These medicines have changed or have updated prescriptions.        Dose/Directions    * amylase-lipase-protease 5000 UNITS Cpep   Commonly known as:  ZENPEP   This may have changed:  Another medication with the same name was added. Make sure you understand how and when to take each.   Used for:  Acute pancreatitis without infection or necrosis, unspecified pancreatitis type        Dose:  2 capsule   Take 2 capsules (10,000 Units) by mouth 3 times daily (with meals)   Quantity:  100 capsule   Refills:  0       * amylase-lipase-protease 25754-95115 UNITS Cpep per EC capsule   Commonly known as:  CREON   This may have changed:  You were already taking a medication with the same name, and this prescription was added. Make sure you understand how and when to take each.   Used for:  Necrotizing pancreatitis   Changed by:  Vito Sanches MD        Take 2-3 with meals / 1-2 with snacks, up to 15 per day.   Quantity:  450 capsule   Refills:  6       * Notice:  This list has 2 medication(s) that are the same as other medications prescribed for you. Read the directions carefully, and ask your doctor or other care provider to review them with you.         Where to get your medicines      These medications were sent to [id number] - [place], [place] - [address]  [address] [address]     Phone:  [phone]     amylase-lipase-protease 25831-61828 UNITS Cpep per EC capsule                Primary Care Provider    Physician No Ref-Primary       NO  REF-PRIMARY PHYSICIAN        Equal Access to Services     CHASE DE GUZMAN : Hadii aad ku hadguzman Ruiz, wajosephineda luqadaha, qaybta kaalmafredy ferguson, re yip. So [facility] [phone].    ATENCIÓN: Si habla español, tiene a quiros disposición servicios gratuitos de asistencia lingüística. Macy al [phone].    We comply with applicable federal civil rights laws and [place] laws. We do not discriminate on the basis of race, color, national origin, age, disability, sex, sexual orientation, or gender identity.            Thank you!     Thank you for choosing [facility] PANCREAS AND BILIARY  for your care. Our goal is always to provide you with excellent care. Hearing back from our patients is one way we can continue to improve our services. Please take a few minutes to complete the written survey that you may receive in the mail after your visit with us. Thank you!             Your Updated Medication List - Protect others around you: Learn how to safely use, store and throw away your medicines at www.disposemymeds.org.          This list is accurate as of: 11/20/17  3:55 PM.  Always use your most recent med list.                   Brand Name Dispense Instructions for use Diagnosis    acetaminophen 325 MG tablet    TYLENOL    100 tablet    Take 2 tablets (650 mg) by mouth every 4 hours as needed for mild pain    Acute pancreatitis without infection or necrosis, unspecified pancreatitis type       * amylase-lipase-protease 5000 UNITS Cpep    ZENPEP    100 capsule    Take 2 capsules (10,000 Units) by mouth 3 times daily (with meals)    Acute pancreatitis without infection or necrosis, unspecified pancreatitis type       * amylase-lipase-protease 57033-13675 UNITS Cpep per EC capsule    CREON    450 capsule    Take 2-3 with meals / 1-2 with snacks, up to 15 per day.    Necrotizing pancreatitis       HYDROcodone-acetaminophen 5-325 MG per tablet    NORCO    10 tablet    Take 0.5-1 tablets by  mouth every 6 hours as needed for moderate to severe pain    Acute pancreatitis without infection or necrosis, unspecified pancreatitis type, Acute pancreatitis with uninfected necrosis, unspecified pancreatitis type       * Notice:  This list has 2 medication(s) that are the same as other medications prescribed for you. Read the directions carefully, and ask your doctor or other care provider to review them with you.

## 2017-11-20 NOTE — LETTER
"11/20/2017       RE: Kitty Bangura  [address] [address]     Dear Colleague,    Thank you for referring your patient, Kitty Bangura, to the [facility] at [facility]. Please see a copy of my visit note below.    [facility] Outpatient Medical Nutrition Therapy      Time Spent:  30 minutes  Session Type:  Initial Individual Session  Referring Physician:  Dr. Vito Sanches  Reason for RD Visit:   Necrotizing pancreatitis, nutritional counseling     Nutrition Assessment:  Patient is here with her  for initial visit with Registered Dietitian (RD).  Patient is a 56 y.o. female with history of necrotizing pancreatitis. Had recent hospitalization for necrotizing pancreatitis. Had been taking zenpep but MD changed type of PERT to creon and increased dosage. Patient reported that she had sharp pains with eating especially if eats anything with fat. Has been eating very low fat foods/meals and eating many of the same foods and only able to tolerate small amounts of food at a time. Is interested in getting some ideas for getting more variety of foods.     Diet Recall:  (usual intake since hospitalization)  Meal Food    Breakfast Fruit, oatmeal OR egg OR fat free yogurt   Lunch Salad with chicken and balsalmic vinegar and bread stick OR homemade fat free/low fat squash and apple soup OR chicken tortilla soup OR broccoli soup OR soup + 1/2 turkey or chicken sandwich OR hummus with carrots and apple slices    Dinner Same as lunch meals   Snacks Fat free yogurt OR 1/2 bagel with FF cream cream and homemade cranberry sauces   Beverages 64 oz water, 2-3 cups skim milk, glass OJ and occasional club soda with OJ     Height:   Ht Readings from Last 1 Encounters:   11/20/17 1.651 m (5' 5\")     Weight:  Patient stated that her weight prior to pancreatitis and hospitalization was ~175-180#. Reported that she had gained weight in the hospital " from fluids and now weight back down to 181# today.  Wt Readings from Last 10 Encounters:   11/20/17 82.1 kg (181 lb 1.6 oz)   11/08/17 93.7 kg (206 lb 9.1 oz)      BMI: 30.2 class I obesity     Labs:  On 11/8/17 decreased albumin (1.9), total protein (5.2) and elevated bilirubin (3.6), ALT (61) and AST (98). Others reviewed.  Pertinent Medications/vitamin and mineral supplements:   Previously had been on 2 capsules zenpep 10,000 with meals but met with MD prior to RD visit on 11/20 and MD changed and increased PERT to Creon 24,000 (2-3 capsules with meals and 1-2 with snacks).  Food Allergies:  NKFA  Estimated Nutrition Needs based on current body weight of 82:   5436-2059 calories (20-25 kcals/kg), 82g protein (1g/kg), 2274-4393 ml fluids (~1 ml/kcal or total fluids per MD).     MALNUTRITION:  % Weight Loss:  Weight loss does not meet criteria for malnutrition   % Intake:  </= 75% for >/= 1 month (severe malnutrition)  Subcutaneous Fat Loss:  None observed  Muscle Loss:  None observed  Fluid Retention:  None noted    Malnutrition Diagnosis: Patient does not meet two of the above criteria necessary for diagnosing malnutrition In Context of:  Acute illness or injury    Nutrition Diagnosis:    Altered GI function related to pain and pain with eating and medical diagnosis  as evidenced by patient report and hx pancreatitis.    Nutrition Prescription: Recommended eating 4-6 smaller low fat balanced meals per day. Encouraged patient to try adding more variety of lower fat foods for variety and per tolerance.    Nutrition Intervention:    Nutrition Education/Counseling:  Provided diet education as follows.  -Recommended following a lowfat diet. Reviewed foods higher in fat and tips/suggestions for selecting lower fat substitutions/foods.   -Encouraged patient to eat 4-6 smaller balanced meals/snacks per day if better tolerated. Discussed some options of foods to add variety to diet/meals and that may be better tolerated  at this time such as canned fruit, bananas, cooked vegetables, softer lower fat protein foods such as fat free/1% cottage cheese, very lean meats and fish for example. Told patient okay to add in and try a variety of lower fat foods that she likes to see if tolerates. Told patient that she can keep food journals and track if experiences any GI issues/pain with eating to help identify any food intolerances.  -Discussed adequate hydration and recommended continuing to drink 64 oz water/fluids per day.  -Take enzymes as directed by MD which is 2-3 capsules of Creon 24,000 with meals and 1-2 capsules with snacks. This will provide 585-878 units of lipase per meal and 293-585 units lipase per snacks.     Educational Materials Provided:  Low fat diet education handout and pancreatitis nutrition therapy  Patient verbalized understanding of education provided. See all recommendations under Goals below.     Goals:  -Can eat 4-6 smaller meals per day if better tolerated  -Eat a lower fat diet. Start trying to add more variety of lower fat foods to see if tolerated.  -Continue drinking 64 oz of water per day.  -Take enzymes as directed by MD.    Nutrition Monitoring and Evaluation: Will monitor adherence to nutrition recommendations at future RD visits.     Further Medical Nutrition Therapy:  PRN  Next Appointment (if applicable):  PRN  Patient was encouraged to call/contact RD with any further questions.    Johana Chahal, MS, RD, LD        Again, thank you for allowing me to participate in the care of your patient.      Sincerely,    Johana Chahal RD

## 2017-11-20 NOTE — PATIENT INSTRUCTIONS
It was nice meeting you today:  -Can eat 4-6 smaller meals per day if better tolerated  -Eat a lower fat diet. Start trying to add more variety of lower fat foods to see if tolerated.  -Continue drinking 64 oz of water per day.  -Take enzymes as directed by MD.  If you would like to schedule a follow up appointment with Johana Chahal Registered Dietitian, please call [phone].

## 2017-11-21 ENCOUNTER — TELEPHONE (OUTPATIENT)
Dept: GASTROENTEROLOGY | Facility: CLINIC | Age: 57
End: 2017-11-21

## 2017-11-21 NOTE — PROGRESS NOTES
"[facility] Outpatient Medical Nutrition Therapy      Time Spent:  30 minutes  Session Type:  Initial Individual Session  Referring Physician:  Dr. Vito Sanches  Reason for RD Visit:   Necrotizing pancreatitis, nutritional counseling     Nutrition Assessment:  Patient is here with her  for initial visit with Registered Dietitian (RD).  Patient is a 56 y.o. female with history of necrotizing pancreatitis. Had recent hospitalization for necrotizing pancreatitis. Had been taking zenpep but MD changed type of PERT to creon and increased dosage. Patient reported that she had sharp pains with eating especially if eats anything with fat. Has been eating very low fat foods/meals and eating many of the same foods and only able to tolerate small amounts of food at a time. Is interested in getting some ideas for getting more variety of foods.     Diet Recall:  (usual intake since hospitalization)  Meal Food    Breakfast Fruit, oatmeal OR egg OR fat free yogurt   Lunch Salad with chicken and balsalmic vinegar and bread stick OR homemade fat free/low fat squash and apple soup OR chicken tortilla soup OR broccoli soup OR soup + 1/2 turkey or chicken sandwich OR hummus with carrots and apple slices    Dinner Same as lunch meals   Snacks Fat free yogurt OR 1/2 bagel with FF cream cream and homemade cranberry sauces   Beverages 64 oz water, 2-3 cups skim milk, glass OJ and occasional club soda with OJ     Height:   Ht Readings from Last 1 Encounters:   11/20/17 1.651 m (5' 5\")     Weight:  Patient stated that her weight prior to pancreatitis and hospitalization was ~175-180#. Reported that she had gained weight in the hospital from fluids and now weight back down to 181# today.  Wt Readings from Last 10 Encounters:   11/20/17 82.1 kg (181 lb 1.6 oz)   11/08/17 93.7 kg (206 lb 9.1 oz)      BMI: 30.2 class I obesity     Labs:  On 11/8/17 decreased albumin (1.9), total protein (5.2) and elevated bilirubin (3.6), ALT (61) and AST " (98). Others reviewed.  Pertinent Medications/vitamin and mineral supplements:   Previously had been on 2 capsules zenpep 10,000 with meals but met with MD prior to RD visit on 11/20 and MD changed and increased PERT to Creon 24,000 (2-3 capsules with meals and 1-2 with snacks).  Food Allergies:  NKFA  Estimated Nutrition Needs based on current body weight of 82:   2247-4459 calories (20-25 kcals/kg), 82g protein (1g/kg), 9499-8119 ml fluids (~1 ml/kcal or total fluids per MD).     MALNUTRITION:  % Weight Loss:  Weight loss does not meet criteria for malnutrition   % Intake:  </= 75% for >/= 1 month (severe malnutrition)  Subcutaneous Fat Loss:  None observed  Muscle Loss:  None observed  Fluid Retention:  None noted    Malnutrition Diagnosis: Patient does not meet two of the above criteria necessary for diagnosing malnutrition In Context of:  Acute illness or injury    Nutrition Diagnosis:    Altered GI function related to pain and pain with eating and medical diagnosis  as evidenced by patient report and hx pancreatitis.    Nutrition Prescription: Recommended eating 4-6 smaller low fat balanced meals per day. Encouraged patient to try adding more variety of lower fat foods for variety and per tolerance.    Nutrition Intervention:    Nutrition Education/Counseling:  Provided diet education as follows.  -Recommended following a lowfat diet. Reviewed foods higher in fat and tips/suggestions for selecting lower fat substitutions/foods.   -Encouraged patient to eat 4-6 smaller balanced meals/snacks per day if better tolerated. Discussed some options of foods to add variety to diet/meals and that may be better tolerated at this time such as canned fruit, bananas, cooked vegetables, softer lower fat protein foods such as fat free/1% cottage cheese, very lean meats and fish for example. Told patient okay to add in and try a variety of lower fat foods that she likes to see if tolerates. Told patient that she can keep food  journals and track if experiences any GI issues/pain with eating to help identify any food intolerances.  -Discussed adequate hydration and recommended continuing to drink 64 oz water/fluids per day.  -Take enzymes as directed by MD which is 2-3 capsules of Creon 24,000 with meals and 1-2 capsules with snacks. This will provide 585-878 units of lipase per meal and 293-585 units lipase per snacks.     Educational Materials Provided:  Low fat diet education handout and pancreatitis nutrition therapy  Patient verbalized understanding of education provided. See all recommendations under Goals below.     Goals:  -Can eat 4-6 smaller meals per day if better tolerated  -Eat a lower fat diet. Start trying to add more variety of lower fat foods to see if tolerated.  -Continue drinking 64 oz of water per day.  -Take enzymes as directed by MD.    Nutrition Monitoring and Evaluation: Will monitor adherence to nutrition recommendations at future RD visits.     Further Medical Nutrition Therapy:  PRN  Next Appointment (if applicable):  PRN  Patient was encouraged to call/contact RD with any further questions.    Johana Chahal, MS, RD, LD

## 2017-11-21 NOTE — TELEPHONE ENCOUNTER
Kitty is informed that she is now scheduled at 8 AM with an arrival time of 6 AM.    SR 11.21.2017 @ 910a

## 2017-11-22 ENCOUNTER — OFFICE VISIT (OUTPATIENT)
Dept: FAMILY MEDICINE | Facility: CLINIC | Age: 57
End: 2017-11-22

## 2017-11-22 VITALS
SYSTOLIC BLOOD PRESSURE: 104 MMHG | DIASTOLIC BLOOD PRESSURE: 73 MMHG | HEART RATE: 74 BPM | BODY MASS INDEX: 29.75 KG/M2 | WEIGHT: 178.8 LBS

## 2017-11-22 DIAGNOSIS — Z01.818 PREOP GENERAL PHYSICAL EXAM: Primary | ICD-10-CM

## 2017-11-22 LAB
BASOPHILS # BLD AUTO: 0.1 10E9/L (ref 0–0.2)
BASOPHILS NFR BLD AUTO: 0.7 %
DIFFERENTIAL METHOD BLD: ABNORMAL
EOSINOPHIL # BLD AUTO: 0.2 10E9/L (ref 0–0.7)
EOSINOPHIL NFR BLD AUTO: 2.1 %
ERYTHROCYTE [DISTWIDTH] IN BLOOD BY AUTOMATED COUNT: 12.3 % (ref 10–15)
HCT VFR BLD AUTO: 35.6 % (ref 35–47)
HGB BLD-MCNC: 11.2 G/DL (ref 11.7–15.7)
IMM GRANULOCYTES # BLD: 0 10E9/L (ref 0–0.4)
IMM GRANULOCYTES NFR BLD: 0.3 %
LYMPHOCYTES # BLD AUTO: 2.5 10E9/L (ref 0.8–5.3)
LYMPHOCYTES NFR BLD AUTO: 34.8 %
MCH RBC QN AUTO: 31.1 PG (ref 26.5–33)
MCHC RBC AUTO-ENTMCNC: 31.5 G/DL (ref 31.5–36.5)
MCV RBC AUTO: 99 FL (ref 78–100)
MONOCYTES # BLD AUTO: 1 10E9/L (ref 0–1.3)
MONOCYTES NFR BLD AUTO: 14.2 %
NEUTROPHILS # BLD AUTO: 3.5 10E9/L (ref 1.6–8.3)
NEUTROPHILS NFR BLD AUTO: 47.9 %
PLATELET # BLD AUTO: 781 10E9/L (ref 150–450)
RBC # BLD AUTO: 3.6 10E12/L (ref 3.8–5.2)
WBC # BLD AUTO: 7.3 10E9/L (ref 4–11)

## 2017-11-22 PROCEDURE — 99213 OFFICE O/P EST LOW 20 MIN: CPT | Performed by: FAMILY MEDICINE

## 2017-11-22 PROCEDURE — 85025 COMPLETE CBC W/AUTO DIFF WBC: CPT | Performed by: FAMILY MEDICINE

## 2017-11-22 PROCEDURE — 36415 COLL VENOUS BLD VENIPUNCTURE: CPT | Performed by: FAMILY MEDICINE

## 2017-11-22 NOTE — NURSING NOTE
"Chief Complaint   Patient presents with     Pre-Op Exam     endoscopic ultrasound        Initial /73 (BP Location: Right arm, Patient Position: Chair, Cuff Size: Adult Regular)  Pulse 74  Wt 178 lb 12.8 oz (81.1 kg)  BMI 29.75 kg/m2 Estimated body mass index is 29.75 kg/(m^2) as calculated from the following:    Height as of 11/20/17: 5' 5\" (1.651 m).    Weight as of this encounter: 178 lb 12.8 oz (81.1 kg).  Medication Reconciliation: complete   Sadie King CMA    "

## 2017-11-22 NOTE — PROGRESS NOTES
[facility]  [address] [address]  [phone]  Dept: [phone]    PRE-OP EVALUATION:  Today's date: 2017    Kitty Bangura (: 1960) presents for pre-operative evaluation assessment as requested by Dr. Metz.  She requires evaluation and anesthesia risk assessment prior to undergoing surgery/procedure for treatment of necrotizing pancreatitis.  Proposed procedure: endoscopic ultrasound    Date of Surgery/ Procedure: 17  Time of Surgery/ Procedure: 8:00am  Hospital/Surgical Facility: [facility]   Fax number for surgical facility: [phone]  Primary Physician: No Ref-Primary, Physician  Type of Anesthesia Anticipated: General    Patient has a Health Care Directive or Living Will:  NO    1. NO - Do you have a history of heart attack, stroke, stent, bypass or surgery on an artery in the head, neck, heart or legs?  2. NO - Do you ever have any pain or discomfort in your chest?  3. NO - Do you have a history of  Heart Failure?  4. YES - ARE YOUR TROUBLED BY SHORTNESS OF BREATH WHEN WALKING ON THE LEVEL, UP A SLIGHT HILL OR AT NIGHT? Occasional SOB due to pancreatitis   5. NO - Do you currently have a cold, bronchitis or other respiratory infection?  6. NO - Do you have a cough, shortness of breath or wheezing?  7. NO - Do you sometimes get pains in the calves of your legs when you walk?  8. NO - Do you or anyone in your family have previous history of blood clots?  9. NO - Do you or does anyone in your family have a serious bleeding problem such as prolonged bleeding following surgeries or cuts?  10. NO - Have you ever had problems with anemia or been told to take iron pills?  11. NO - Have you had any abnormal blood loss such as black, tarry or bloody stools, or abnormal vaginal bleeding?  12. NO - Have you ever had a blood transfusion?  13. YES - HAVE YOU OR ANY OF YOUR RELATIVES EVER HAD PROBLEMS WITH ANESTHESIA? nausea  14. NO - Do you have sleep  apnea, excessive snoring or daytime drowsiness?  15. NO - Do you have any prosthetic heart valves?  16. NO - Do you have prosthetic joints?  17. NO - Is there any chance that you may be pregnant?        HPI:                                                      Brief HPI related to upcoming procedure: Patient is a 56 yr old female here for a pre op evaluation for an EUS. This is being done for investigating a pancreas mass . She denies any acute symptoms but still having some abdominal pain.No fevers today.      See problem list for active medical problems.  Problems all longstanding and stable, except as noted/documented.  See ROS for pertinent symptoms related to these conditions.                                                                                                  .    MEDICAL HISTORY:                                                    Patient Active Problem List    Diagnosis Date Noted     Acute pancreatitis 2017     Priority: Medium     Leukocytosis 2017     Priority: Medium     Fatty liver 2017     Priority: Medium     Seen on US 2017       Pancreatic mass 2017     Priority: Medium     On CT abd/pelvis: 3 cm heterogeneous mass within the tail of the pancreas. A few  calcifications are noted along the lateral margin of the mass.       Pancreatitis 2017     Priority: Medium      History reviewed. No pertinent past medical history.  Past Surgical History:   Procedure Laterality Date     ABDOMEN SURGERY      Ruptured tubal pregnancies, additional surgeries followed     BACK SURGERY      L5, S1 discectomy     BREAST SURGERY      Breast reduction     COLONOSCOPY       GYN SURGERY      Multiple ectopic pregnancies, reconnect,       Current Outpatient Prescriptions   Medication Sig Dispense Refill     amylase-lipase-protease (CREON) 36250-94447 UNITS CPEP per EC capsule Take 2-3 with meals / 1-2 with snacks, up to 15 per day. 450 capsule 6      acetaminophen (TYLENOL) 325 MG tablet Take 2 tablets (650 mg) by mouth every 4 hours as needed for mild pain 100 tablet      HYDROcodone-acetaminophen (NORCO) 5-325 MG per tablet Take 0.5-1 tablets by mouth every 6 hours as needed for moderate to severe pain (Patient not taking: Reported on 11/20/2017) 10 tablet 0     amylase-lipase-protease (ZENPEP) 5000 UNITS CPEP Take 2 capsules (10,000 Units) by mouth 3 times daily (with meals) (Patient not taking: Reported on 11/22/2017) 100 capsule 0     OTC products: None, except as noted above    No Known Allergies   Latex Allergy: NO    Social History   Substance Use Topics     Smoking status: Former Smoker     Packs/day: 0.50     Types: Cigarettes     Start date: 1/1/1974     Quit date: 1981     Smokeless tobacco: Never Used     Alcohol use 12.6 oz/week      Comment: Now quit since Oct 31     History   Drug Use No       REVIEW OF SYSTEMS:                                                    C: NEGATIVE for fever, chills, change in weight  I: NEGATIVE for worrisome rashes, moles or lesions  E: NEGATIVE for vision changes or irritation  E/M: NEGATIVE for ear, mouth and throat problems  R: NEGATIVE for significant cough or SOB  CV: NEGATIVE for chest pain, palpitations or peripheral edema  GI: POSITIVE for abdominal pain epigastric  : NEGATIVE for frequency, dysuria, or hematuria  M: NEGATIVE for significant arthralgias or myalgia  N: NEGATIVE for weakness, dizziness or paresthesias  E: NEGATIVE for temperature intolerance, skin/hair changes  H: NEGATIVE for bleeding problems  P: NEGATIVE for changes in mood or affect    EXAM:                                                    /73 (BP Location: Right arm, Patient Position: Chair, Cuff Size: Adult Regular)  Pulse 74  Wt 178 lb 12.8 oz (81.1 kg)  BMI 29.75 kg/m2    GENERAL APPEARANCE: healthy, alert and no distress     EYES: EOMI, PERRL     HENT: ear canals and TM's normal and nose and mouth without ulcers or  lesions     NECK: no adenopathy, no asymmetry, masses, or scars and thyroid normal to palpation     RESP: lungs clear to auscultation - no rales, rhonchi or wheezes     CV: regular rates and rhythm, normal S1 S2, no S3 or S4 and no murmur, click or rub     MS: extremities normal- no gross deformities noted, no evidence of inflammation in joints, FROM in all extremities.     SKIN: no suspicious lesions or rashes     PSYCH: mentation appears normal. and affect normal/bright     LYMPHATICS: No axillary, cervical, or supraclavicular nodes    DIAGNOSTICS:                                                    EKG: Not indicated due to non-vascular surgery and low risk of event (age <65 and without cardiac risk factors)    Recent Labs   Lab Test  11/08/17   0505  11/07/17 2000 11/07/17   0620   11/06/17   0545   11/05/17   0540   HGB   --    --   10.3*   --   10.1*   --   10.4*   PLT   --    --   245   --   219   --   183   INR   --    --    --    --   1.31*   --    --    NA   --    --    --    --   138   --   139   POTASSIUM  3.8  3.6  3.2*   < >  3.2*   < >  3.3*   CR   --    --   0.58   --   0.58   --   0.53    < > = values in this interval not displayed.        IMPRESSION:                                                    Reason for surgery/procedure: Pancreatic mass  Diagnosis/reason for consult: Pre op evaluation    The proposed surgical procedure is considered LOW risk.    REVISED CARDIAC RISK INDEX  The patient has the following serious cardiovascular risks for perioperative complications such as (MI, PE, VFib and 3  AV Block):  No serious cardiac risks  INTERPRETATION: 0 risks: Class I (very low risk - 0.4% complication rate)    The patient has the following additional risks for perioperative complications:  No identified additional risks  The 10-year ASCVD risk score ([place] KAYCE Jr, et al., 2013) is: 1.7%    Values used to calculate the score:      Age: 56 years      Sex: Female      Is Non-   American: No      Diabetic: No      Tobacco smoker: No      Systolic Blood Pressure: 104 mmHg      Is BP treated: No      HDL Cholesterol: 59 mg/dL      Total Cholesterol: 200 mg/dL      ICD-10-CM    1. Preop general physical exam Z01.818        RECOMMENDATIONS:                                                        Cardiovascular Risk  Performs 4 METs exercise without symptoms (Light housework (dusting, washing dishes) and Climb a flight of stairs) .         --Patient is to take all scheduled medications on the day of surgery EXCEPT for modifications listed below.    APPROVAL GIVEN to proceed with proposed procedure, without further diagnostic evaluation       Signed Electronically by: Solange Mcgill MD    Copy of this evaluation report is provided to requesting physician.    [facility] Preop Guidelines

## 2017-11-22 NOTE — MR AVS SNAPSHOT
After Visit Summary   11/22/2017    Kitty Bangura    MRN: [id number]           Patient Information     Date Of Birth          1960        Visit Information        Provider Department      11/22/2017 11:00 AM Solange Mcgill MD [facility]        Today's Diagnoses     Preop general physical exam    -  1      Care Instructions      Before Your Surgery      Call your surgeon if there is any change in your health. This includes signs of a cold or flu (such as a sore throat, runny nose, cough, rash or fever).    Do not smoke, drink alcohol or take over the counter medicine (unless your surgeon or primary care doctor tells you to) for the 24 hours before and after surgery.    If you take prescribed drugs: Follow your doctor s orders about which medicines to take and which to stop until after surgery.    Eating and drinking prior to surgery: follow the instructions from your surgeon  Take a shower or bath the night before surgery. Use the soap your surgeon gave you to gently clean your skin. If you do not have soap from your surgeon, use your regular soap. Do not shave or scrub the surgery site.  Wear clean pajamas and have clean sheets on your bed.   Presurgery Checklist  You are scheduled to have surgery. The healthcare staff will try to make your stay comfortable. Use the guidelines below to remind yourself what to do before surgery. Be sure to follow any specific pre-op instructions from your surgeon or nurse.  Preparing for Surgery  Ask your surgeon if you ll need a blood transfusion during surgery and if so, how to prepare for it. In some cases, you can donate blood before surgery. If needed, this blood can be given back (transfused) to you during or after surgery.  If you are having abdominal surgery, ask what you need to do to clear your bowel.  Tell your surgeon if you have allergies to any medications or foods.  Arrange for an adult family member or friend to drive  you home after surgery. If possible, have someone ready to help you at home as you recover.  Call the surgeon if you get a cold, fever, sore throat, diarrhea, or other health problem just before surgery. Your surgeon can decide whether or not to postpone the surgery.  Medications  Tell your surgeon about all medications you take, including prescription and over-the-counter products such as herbal remedies and vitamins. Ask if you should continue taking them.  If you take ibuprofen, naproxen, or  blood thinners  such as aspirin, clopidogrel (Plavix), or warfarin (Coumadin), ask your surgeon whether you should stop taking them and how long before surgery you should stop.  You may be told to take antibiotics just before surgery to prevent infection. If so, follow instructions carefully on how to take them.  If you are told to take medications called anticoagulants to prevent blood clots after surgery, be sure to follow the instructions on how to take them.  Stop Smoking  If you smoke, healing may take longer. So at least 2 week(s) before surgery, stop smoking.  Bathing or Showering Before Surgery  If instructed, wash with antibacterial soap. Afterward, do not use lotions or powders.  If you are having surgery on the head, you may be asked to shampoo with antibacterial soap. Follow instructions for doing so.  Do Not Remove Hair from the Surgery Site  Do not shave hair from the incision site, unless you are given specific instructions to do so. Usually, if hair needs to be removed, it will be done at the hospital right before surgery.  Don t Eat or Drink  Your doctor will tell you when to stop eating and drinking. If you do not follow your doctor's instructions, your procedure may be postponed or rescheduled for another day.  If your surgeon tells you to continue any medications, take them with small sips of water.  You can brush your teeth and rinse your mouth, but don t swallow any water.  Day of Surgery  Do not wear  makeup. Do not use perfume, deodorant, or hairspray. Remove nail polish and artificial nails.  Leave jewelry (including rings), watches, and other valuables at home.  Be sure to bring health insurance cards or forms and a photo ID.  Bring a list of your medications (include the name, dose, how often you take them, and the time last dose was taken).  Arrive on time at the hospital or surgery facility.    4455-2931 The [employer]. [address] [place] [address]. All rights reserved. This information is not intended as a substitute for professional medical care. Always follow your healthcare professional's instructions.  This information has been modified by your health care provider with permission from the publisher.              Follow-ups after your visit        Your next 10 appointments already scheduled     Nov 27, 2017 10:40 AM CST   (Arrive by 10:25 AM)   New Patient Visit with González Metz MD   [facility]    [address]  2nd Floor  [facility] [id number] [phone]            Nov 29, 2017   Procedure with González Metz MD   [facility], [place], Same Day Surgery (--)    [address] [id number] [phone]              Who to contact     If you have questions or need follow up information about today's clinic visit or your schedule please contact [facility] directly at [phone].  Normal or non-critical lab and imaging results will be communicated to you by MyChart, letter or phone within 4 business days after the clinic has received the results. If you do not hear from us within 7 days, please contact the clinic through MyChart or phone. If you have a critical or abnormal lab result, we will notify you by phone as soon as possible.  Submit refill requests through [employer] or call your pharmacy and they will forward the refill request to us. Please allow 3 business days for your  refill to be completed.          Additional Information About Your Visit        [employer] Information     [employer] gives you secure access to your electronic health record. If you see a primary care provider, you can also send messages to your care team and make appointments. If you have questions, please call your primary care clinic.  If you do not have a primary care provider, please call [phone] and they will assist you.        Care EveryWhere ID     This is your Care EveryWhere ID. This could be used by other organizations to access your [place] medical records  [id number]        Your Vitals Were     Pulse BMI (Body Mass Index)                74 29.75 kg/m2           Blood Pressure from Last 3 Encounters:   11/22/17 104/73   11/20/17 115/76   11/08/17 136/87    Weight from Last 3 Encounters:   11/22/17 178 lb 12.8 oz (81.1 kg)   11/20/17 181 lb 1.6 oz (82.1 kg)   11/08/17 206 lb 9.1 oz (93.7 kg)              We Performed the Following     CBC with platelets differential        Primary Care Provider    Physician No Ref-Primary       NO REF-PRIMARY PHYSICIAN        Equal Access to Services     [facility]: Hadii zaheer chino Sopaulo, waaxda luqadaha, qaybta kaalmafredy ferguson, re gerardo . So [facility] [phone].    ATENCIÓN: Si habla español, tiene a quiros disposición servicios gratuitos de asistencia lingüística. Macy al [phone].    We comply with applicable federal civil rights laws and [place] laws. We do not discriminate on the basis of race, color, national origin, age, disability, sex, sexual orientation, or gender identity.            Thank you!     Thank you for choosing [facility]  for your care. Our goal is always to provide you with excellent care. Hearing back from our patients is one way we can continue to improve our services. Please take a few minutes to complete the written survey that you may receive in the mail after your visit with us.  Thank you!             Your Updated Medication List - Protect others around you: Learn how to safely use, store and throw away your medicines at www.disposemymeds.org.          This list is accurate as of: 11/22/17 11:24 AM.  Always use your most recent med list.                   Brand Name Dispense Instructions for use Diagnosis    acetaminophen 325 MG tablet    TYLENOL    100 tablet    Take 2 tablets (650 mg) by mouth every 4 hours as needed for mild pain    Acute pancreatitis without infection or necrosis, unspecified pancreatitis type       * amylase-lipase-protease 5000 UNITS Cpep    ZENPEP    100 capsule    Take 2 capsules (10,000 Units) by mouth 3 times daily (with meals)    Acute pancreatitis without infection or necrosis, unspecified pancreatitis type       * amylase-lipase-protease 56860-65430 UNITS Cpep per EC capsule    CREON    450 capsule    Take 2-3 with meals / 1-2 with snacks, up to 15 per day.    Necrotizing pancreatitis       HYDROcodone-acetaminophen 5-325 MG per tablet    NORCO    10 tablet    Take 0.5-1 tablets by mouth every 6 hours as needed for moderate to severe pain    Acute pancreatitis without infection or necrosis, unspecified pancreatitis type, Acute pancreatitis with uninfected necrosis, unspecified pancreatitis type       * Notice:  This list has 2 medication(s) that are the same as other medications prescribed for you. Read the directions carefully, and ask your doctor or other care provider to review them with you.

## 2017-11-22 NOTE — PATIENT INSTRUCTIONS
Before Your Surgery      Call your surgeon if there is any change in your health. This includes signs of a cold or flu (such as a sore throat, runny nose, cough, rash or fever).    Do not smoke, drink alcohol or take over the counter medicine (unless your surgeon or primary care doctor tells you to) for the 24 hours before and after surgery.    If you take prescribed drugs: Follow your doctor s orders about which medicines to take and which to stop until after surgery.    Eating and drinking prior to surgery: follow the instructions from your surgeon  Take a shower or bath the night before surgery. Use the soap your surgeon gave you to gently clean your skin. If you do not have soap from your surgeon, use your regular soap. Do not shave or scrub the surgery site.  Wear clean pajamas and have clean sheets on your bed.   Presurgery Checklist  You are scheduled to have surgery. The healthcare staff will try to make your stay comfortable. Use the guidelines below to remind yourself what to do before surgery. Be sure to follow any specific pre-op instructions from your surgeon or nurse.  Preparing for Surgery  Ask your surgeon if you ll need a blood transfusion during surgery and if so, how to prepare for it. In some cases, you can donate blood before surgery. If needed, this blood can be given back (transfused) to you during or after surgery.  If you are having abdominal surgery, ask what you need to do to clear your bowel.  Tell your surgeon if you have allergies to any medications or foods.  Arrange for an adult family member or friend to drive you home after surgery. If possible, have someone ready to help you at home as you recover.  Call the surgeon if you get a cold, fever, sore throat, diarrhea, or other health problem just before surgery. Your surgeon can decide whether or not to postpone the surgery.  Medications  Tell your surgeon about all medications you take, including prescription and over-the-counter  products such as herbal remedies and vitamins. Ask if you should continue taking them.  If you take ibuprofen, naproxen, or  blood thinners  such as aspirin, clopidogrel (Plavix), or warfarin (Coumadin), ask your surgeon whether you should stop taking them and how long before surgery you should stop.  You may be told to take antibiotics just before surgery to prevent infection. If so, follow instructions carefully on how to take them.  If you are told to take medications called anticoagulants to prevent blood clots after surgery, be sure to follow the instructions on how to take them.  Stop Smoking  If you smoke, healing may take longer. So at least 2 week(s) before surgery, stop smoking.  Bathing or Showering Before Surgery  If instructed, wash with antibacterial soap. Afterward, do not use lotions or powders.  If you are having surgery on the head, you may be asked to shampoo with antibacterial soap. Follow instructions for doing so.  Do Not Remove Hair from the Surgery Site  Do not shave hair from the incision site, unless you are given specific instructions to do so. Usually, if hair needs to be removed, it will be done at the hospital right before surgery.  Don t Eat or Drink  Your doctor will tell you when to stop eating and drinking. If you do not follow your doctor's instructions, your procedure may be postponed or rescheduled for another day.  If your surgeon tells you to continue any medications, take them with small sips of water.  You can brush your teeth and rinse your mouth, but don t swallow any water.  Day of Surgery  Do not wear makeup. Do not use perfume, deodorant, or hairspray. Remove nail polish and artificial nails.  Leave jewelry (including rings), watches, and other valuables at home.  Be sure to bring health insurance cards or forms and a photo ID.  Bring a list of your medications (include the name, dose, how often you take them, and the time last dose was taken).  Arrive on time at the  hospital or surgery facility.    3438-1709 [employer]. [address] [place] [address], [place] [address]. All rights reserved. This information is not intended as a substitute for professional medical care. Always follow your healthcare professional's instructions.  This information has been modified by your health care provider with permission from the publisher.

## 2017-11-24 ASSESSMENT — ENCOUNTER SYMPTOMS
NIGHT SWEATS: 0
EYE PAIN: 0
DIFFICULTY URINATING: 1
POLYDIPSIA: 0
COUGH: 0
DOUBLE VISION: 0
BLOATING: 1
DYSURIA: 0
NAUSEA: 1
SHORTNESS OF BREATH: 1
HEARTBURN: 1
ALTERED TEMPERATURE REGULATION: 1
DIARRHEA: 0
BLOOD IN STOOL: 0
HALLUCINATIONS: 0
CONSTIPATION: 1
FEVER: 0
DECREASED APPETITE: 0
COUGH DISTURBING SLEEP: 0
VOMITING: 1
EYE REDNESS: 0
POSTURAL DYSPNEA: 0
HEMOPTYSIS: 0
SPUTUM PRODUCTION: 0
WHEEZING: 0
DYSPNEA ON EXERTION: 1
EYE WATERING: 0
BOWEL INCONTINENCE: 0
WEIGHT LOSS: 0
FLANK PAIN: 0
INCREASED ENERGY: 0
RECTAL PAIN: 0
JAUNDICE: 1
SNORES LOUDLY: 0
CHILLS: 0
EYE IRRITATION: 0
WEIGHT GAIN: 0
FATIGUE: 1
HEMATURIA: 0
POLYPHAGIA: 0
ABDOMINAL PAIN: 1

## 2017-11-27 ENCOUNTER — OFFICE VISIT (OUTPATIENT)
Dept: GASTROENTEROLOGY | Facility: CLINIC | Age: 57
End: 2017-11-27
Attending: INTERNAL MEDICINE

## 2017-11-27 VITALS
HEIGHT: 65 IN | SYSTOLIC BLOOD PRESSURE: 121 MMHG | BODY MASS INDEX: 29.81 KG/M2 | OXYGEN SATURATION: 95 % | WEIGHT: 178.9 LBS | HEART RATE: 73 BPM | DIASTOLIC BLOOD PRESSURE: 83 MMHG | RESPIRATION RATE: 16 BRPM | TEMPERATURE: 98 F

## 2017-11-27 DIAGNOSIS — K85.91 ACUTE NECROTIZING PANCREATITIS: ICD-10-CM

## 2017-11-27 DIAGNOSIS — K85.91 ACUTE NECROTIZING PANCREATITIS: Primary | ICD-10-CM

## 2017-11-27 LAB
ALBUMIN SERPL-MCNC: 3.6 G/DL (ref 3.4–5)
ALP SERPL-CCNC: 89 U/L (ref 40–150)
ALT SERPL W P-5'-P-CCNC: 67 U/L (ref 0–50)
ANION GAP SERPL CALCULATED.3IONS-SCNC: 6 MMOL/L (ref 3–14)
AST SERPL W P-5'-P-CCNC: 37 U/L (ref 0–45)
BILIRUB SERPL-MCNC: 0.6 MG/DL (ref 0.2–1.3)
BUN SERPL-MCNC: 16 MG/DL (ref 7–30)
CALCIUM SERPL-MCNC: 9.6 MG/DL (ref 8.5–10.1)
CHLORIDE SERPL-SCNC: 103 MMOL/L (ref 94–109)
CO2 SERPL-SCNC: 28 MMOL/L (ref 20–32)
CREAT SERPL-MCNC: 0.7 MG/DL (ref 0.52–1.04)
ERYTHROCYTE [DISTWIDTH] IN BLOOD BY AUTOMATED COUNT: 12.5 % (ref 10–15)
GFR SERPL CREATININE-BSD FRML MDRD: 86 ML/MIN/1.7M2
GLUCOSE SERPL-MCNC: 86 MG/DL (ref 70–99)
HBA1C MFR BLD: 5.6 % (ref 4.3–6)
HCT VFR BLD AUTO: 39 % (ref 35–47)
HGB BLD-MCNC: 12.2 G/DL (ref 11.7–15.7)
INR PPP: 1.09 (ref 0.86–1.14)
MCH RBC QN AUTO: 31.1 PG (ref 26.5–33)
MCHC RBC AUTO-ENTMCNC: 31.3 G/DL (ref 31.5–36.5)
MCV RBC AUTO: 100 FL (ref 78–100)
PLATELET # BLD AUTO: 514 10E9/L (ref 150–450)
POTASSIUM SERPL-SCNC: 4.3 MMOL/L (ref 3.4–5.3)
PROT SERPL-MCNC: 8.2 G/DL (ref 6.8–8.8)
RBC # BLD AUTO: 3.92 10E12/L (ref 3.8–5.2)
SODIUM SERPL-SCNC: 137 MMOL/L (ref 133–144)
WBC # BLD AUTO: 7.6 10E9/L (ref 4–11)

## 2017-11-27 PROCEDURE — 85610 PROTHROMBIN TIME: CPT | Performed by: INTERNAL MEDICINE

## 2017-11-27 PROCEDURE — 85027 COMPLETE CBC AUTOMATED: CPT | Performed by: INTERNAL MEDICINE

## 2017-11-27 PROCEDURE — 83036 HEMOGLOBIN GLYCOSYLATED A1C: CPT | Performed by: INTERNAL MEDICINE

## 2017-11-27 PROCEDURE — 36415 COLL VENOUS BLD VENIPUNCTURE: CPT | Performed by: INTERNAL MEDICINE

## 2017-11-27 PROCEDURE — 80053 COMPREHEN METABOLIC PANEL: CPT | Performed by: INTERNAL MEDICINE

## 2017-11-27 PROCEDURE — 99213 OFFICE O/P EST LOW 20 MIN: CPT | Mod: ZF

## 2017-11-27 ASSESSMENT — PAIN SCALES - GENERAL: PAINLEVEL: MILD PAIN (2)

## 2017-11-27 NOTE — MR AVS SNAPSHOT
After Visit Summary   11/27/2017    Kitty Bangura    MRN: [id number]           Patient Information     Date Of Birth          1960        Visit Information        Provider Department      11/27/2017 10:40 AM González Metz MD [facility]        Today's Diagnoses     Acute necrotizing pancreatitis    -  1       Follow-ups after your visit        Your next 10 appointments already scheduled     Dec 12, 2017   Procedure with Guru Cecilia Staples MD   [facility], [place], Same Day Surgery (--)    [address] [facility] [address]   [phone]            Dec 14, 2017   Procedure with González Metz MD   [facility], [place], Same Day Surgery (--)    [address] [facility] [address]   [phone]            Jan 08, 2018  8:00 AM CST   (Arrive by 7:30 AM)   New Patient Visit with Jovany Monk MD   [facility] ([facility])    [address] [place] [address] [phone]            Jan 08, 2018  9:30 AM CST   (Arrive by 9:15 AM)   New Patient Visit with Ja Byrd MD   [facility] ([facility])    [address] [place] [address] [phone]              Who to contact     If you have questions or need follow up information about today's clinic visit or your schedule please contact [facility] directly at [phone].  Normal or non-critical lab and imaging results will be communicated to you by MyChart, letter or phone within 4 business days after the clinic has received the results. If you do not hear from us within 7 days, please contact the clinic through MyChart or phone. If you have a critical or abnormal lab result, we will notify you by phone as soon as possible.  Submit refill requests through [employer] or call your pharmacy and they will forward the refill request to us. Please allow 3  "business days for your refill to be completed.          Additional Information About Your Visit        MyChart Information     lettrshart gives you secure access to your electronic health record. If you see a primary care provider, you can also send messages to your care team and make appointments. If you have questions, please call your primary care clinic.  If you do not have a primary care provider, please call [phone] and they will assist you.        Care EveryWhere ID     This is your Care EveryWhere ID. This could be used by other organizations to access your [facility] medical records  [id number]        Your Vitals Were     Pulse Temperature Respirations Height Pulse Oximetry BMI (Body Mass Index)    73 98  F (36.7  C) (Oral) 16 1.651 m (5' 5\") 95% 29.77 kg/m2       Blood Pressure from Last 3 Encounters:   12/12/17 130/55   12/04/17 106/71   11/29/17 105/66    Weight from Last 3 Encounters:   12/11/17 84 kg (185 lb 3.2 oz)   12/04/17 78.3 kg (172 lb 9.9 oz)   11/29/17 80.1 kg (176 lb 9.4 oz)              We Performed the Following     CBC with platelets     Hemoglobin A1c        Primary Care Provider Fax #    Physician No Ref-Primary [phone] [address] [address]        Equal Access to Services     CHASE DE GUZMAN : Hadii zaheer ku hadasho Soomaali, waaxda luqadaha, qaybta kaalmada adeegyada, re gerardo . So [facility] [phone].    ATENCIÓN: Si habla español, tiene a quiros disposición servicios gratuitos de asistencia lingüística. Llame al [phone].    We comply with applicable federal civil rights laws and [place] laws. We do not discriminate on the basis of race, color, national origin, age, disability, sex, sexual orientation, or gender identity.            Thank you!     Thank you for choosing [facility]  for your care. Our goal is always to provide you with excellent care. Hearing back from our patients is one way we can continue to " improve our services. Please take a few minutes to complete the written survey that you may receive in the mail after your visit with us. Thank you!             Your Updated Medication List - Protect others around you: Learn how to safely use, store and throw away your medicines at www.disposemymeds.org.          This list is accurate as of: 11/27/17 11:59 PM.  Always use your most recent med list.                   Brand Name Dispense Instructions for use Diagnosis    acetaminophen 325 MG tablet    TYLENOL    100 tablet    Take 2 tablets (650 mg) by mouth every 4 hours as needed for mild pain    Acute pancreatitis without infection or necrosis, unspecified pancreatitis type       amylase-lipase-protease 74390-79199 UNITS Cpep per EC capsule    CREON    450 capsule    Take 2-3 with meals / 1-2 with snacks, up to 15 per day.    Necrotizing pancreatitis

## 2017-11-27 NOTE — PROGRESS NOTES
Answers for HPI/ROS submitted by the patient on 11/24/2017   General Symptoms: Yes  Skin Symptoms: No  HENT Symptoms: No  EYE SYMPTOMS: Yes  HEART SYMPTOMS: No  LUNG SYMPTOMS: Yes  INTESTINAL SYMPTOMS: Yes  URINARY SYMPTOMS: Yes  GYNECOLOGIC SYMPTOMS: No  BREAST SYMPTOMS: No  SKELETAL SYMPTOMS: No  BLOOD SYMPTOMS: No  NERVOUS SYSTEM SYMPTOMS: No  MENTAL HEALTH SYMPTOMS: No  Fever: No  Loss of appetite: No  Weight loss: No  Weight gain: No  Fatigue: Yes  Night sweats: No  Chills: No  Increased stress: Yes  Excessive hunger: No  Excessive thirst: No  Feeling hot or cold when others believe the temperature is normal: Yes  Loss of height: No  Post-operative complications: No  Surgical site pain: No  Hallucinations: No  Change in or Loss of Energy: No  Hyperactivity: No  Confusion: No  Eye pain: No  Vision loss: No  Dry eyes: No  Watery eyes: No  Eye bulging: No  Double vision: No  Flashing of lights: No  Spots: No  Floaters: Yes  Redness: No  Crossed eyes: No  Tunnel Vision: No  Yellowing of eyes: No  Eye irritation: No  Cough: No  Sputum or phlegm: No  Coughing up blood: No  Difficulty breating or shortness of breath: Yes  Snoring: No  Wheezing: No  Difficulty breathing on exertion: Yes  Nighttime Cough: No  Difficulty breathing when lying flat: No  Heart burn or indigestion: Yes  Nausea: Yes  Vomiting: Yes  Abdominal pain: Yes  Bloating: Yes  Constipation: Yes  Diarrhea: No  Blood in stool: No  Black stools: No  Rectal or Anal pain: No  Fecal incontinence: No  Yellowing of skin or eyes: Yes  Vomit with blood: No  Change in stools: Yes  Trouble holding urine or incontinence: No  Pain or burning: No  Trouble starting or stopping: No  Increased frequency of urination: No  Blood in urine: No  Decreased frequency of urination: No  Frequent nighttime urination: Yes  Flank pain: No  Difficulty emptying bladder: Yes  SUBJECTIVE:  The patient is a 56-year-old white female who I was asked to see in consultation at the request  of Dr. Vito Sanches and Dr. Belen Lord for evaluation of a recent episode of necrotizing pancreatitis complicated by walled off necrosis.      The patient presented with sudden onset severe incapacitating acute abdominal pain on 11/01/2017.  She was admitted to [facility] and found to have marked elevation of lipase to 41,960.  She developed sirs and a pleural effusion and thus had a total BISAP score of 2.  Her renal function and respiratory function remained intact without evidence of organ failure.  The etiology of her pancreatitis was somewhat unclear, although she did report regular alcohol intake of 1-2 glasses of wine per night.  Otherwise, evaluation showed a normal triglyceride level, low to normal calcium level.  She was on no medications prior to admission to suggest drug induced pancreatitis.  She had an ultrasound which showed no evidence of gallstone disease.  Subsequent MRCP also showed no evidence of gallstone disease.  During that hospitalization, her liver functions were elevated with her total bilirubin rising up to as high as 5.3.  Her AST was slightly elevated but less than twice upper normal and her ALT was normal.  This appears to have resolved spontaneously, although the labs have not been repeated and will be performed today.      The patient denies any prior history of pancreatitis although she does describe having brief severe abdominal pain in 2006 for which no etiology was determined.  In addition, she describes a vague upper abdominal pain intermittently for the past 5 months but nothing near as severe as this recent hospitalization.      Imaging during her hospitalization showed evolving necrotizing pancreatitis involving the mid body and neck of the pancreas.  Subsequently, she was discharged and had followup imaging on 11/20 which shows resolution of the peripancreatic inflammatory changes and development of a large area of relatively mature appearing walled off  "necrosis measuring 8.6e57p83 cm in diameter.      Of note, the patient brings with her an MRI of the back report from 04/2004 where there was mention of a 2.5 cm rounded focus of increased T2 signal in the region of the pancreatic tail.  This was incompletely visualized and CT was recommended for further evaluation, although the patient never had it.  On her more recent imaging there is a mature appearing cystic lesion to the left of the area of walled off necrosis which appears to have a partially calcified wall and a mixed solid and cystic component which likely corresponds to this previous imaging finding.      The patient continues to have intermittent, sharp left upper quadrant and epigastric pain which she describes as a pressure-like sensation.  This limits her ability to eat, although she has not been losing weight.  She also states that it bothers her while walking.  She is not requiring any analgesic medications at present.  She denies diarrhea but is taking Creon 24,000 units, 3 with meals and 1 with snacks.  She states prior to the enzyme she was having alternating constipation and diarrhea.  She is not currently taking any medications for blood sugar.      PAST MEDICAL HISTORY:  Otherwise unremarkable.      PAST SURGICAL HISTORY:  Negative.      ALLERGIES/MEDICATIONS:  Reviewed and documented in Epic.  Again, not taking any pain medications at present.      FAMILY HISTORY:  Negative for pancreatic duct disease other than she believes that her maternal grandfather may have had some \"pancreas issue.\"  This was not felt to be malignant.  He was also diabetic and required dialysis.      SOCIAL HISTORY:  The patient describes a regular alcohol use, 1-2 glasses of wine per night and more parties up until the onset of her pancreatitis.  She reports a total abstinent since then.  There is no history of tobacco use.      REVIEW OF SYSTEMS:  See history of present illness and patient self-reported inventory which " is attached to this chart.      PHYSICAL EXAMINATION:   GENERAL:  The patient is well-appearing in no acute distress.   HEENT:  Exam shows no evidence of scleral icterus.   LUNGS:  Clear to auscultation.   CARDIAC:  Exam shows a regular rate and rhythm without murmur, gallop or rub.   ABDOMEN:  Shows mild diffuse tenderness to palpation in all 4 quadrants.  There is more significant tenderness to light palpation in the epigastrium with fullness in this region.  There is no rebound, guarding or rigidity.  There is no palpable hepatosplenomegaly.  Bowel sounds are normoactive.   EXTREMITIES:  No clubbing, cyanosis or edema.   SKIN:  Shows no jaundice.   PSYCH:  Her affect is bright and appropriate.      ASSESSMENT AND PLAN:     1.  Walled off necrosis.  The patient has ongoing symptoms with significant epigastric tenderness and intermittent pain with walking and interfering with her oral intake.  We discussed management options for this which included endoscopic transluminal drainage and subsequent necrosectomy, surgical necrosectomy, and conservative management.  She is unwilling to consider further course of conservative management given the degree of her pain.  She requested that we proceed as scheduled with endoscopic drainage.  This was already scheduled this Wednesday.  The risks were discussed in detail including risk of infection, perforation, leakage and bleeding.  We discussed that there is a small chance she will require admission for observation.  We also discussed that she will need ongoing endoscopic debridement which on average may require an additional 3-4 endoscopic procedures.  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  As such, we will eventually exchange her metal transgastric stents for a permanent plastic double pigtail stent which would be left in place long-term.   2.  The patient discussed issues related to the cost of her pancreatic enzyme supplements.   We did help her with the patient assistance form but also I would recommend that we check a fecal elastase to see if she in fact needs these long-term.   3.  It appears her jaundice has resolved but will recheck liver function tests today to confirm that these have normalized.  The etiology of this is somewhat unclear.  She did have a low-grade AST elevation and I question whether her alcohol ingestion history may be more significant than she is reporting which may also be an etiology for her pancreatitis.  In any event, we will examine her common bile duct with the endoscopic ultrasound at the time of her procedure.   4.  I will check a glycosylated hemoglobin and glucose level today to assess for possible diabetes mellitus.      We will arrange for further followup at the time of her procedure and after discussion with Dr. Sanches.  Again, most of her followup will be performed at the time of scheduled endoscopic debridement sessions.

## 2017-11-27 NOTE — NURSING NOTE
"Oncology Rooming Note    November 27, 2017 10:49 AM   Kitty Bangura is a 56 year old female who presents for:    Chief Complaint   Patient presents with     Oncology Clinic Visit     New Patient visit/ History of Pancreatitis, having EUS this wednesday.     Initial Vitals: /83  Pulse 73  Temp 98  F (36.7  C) (Oral)  Resp 16  Ht 1.651 m (5' 5\")  Wt 81.1 kg (178 lb 14.4 oz)  SpO2 95%  BMI 29.77 kg/m2 Estimated body mass index is 29.77 kg/(m^2) as calculated from the following:    Height as of this encounter: 1.651 m (5' 5\").    Weight as of this encounter: 81.1 kg (178 lb 14.4 oz). Body surface area is 1.93 meters squared.  Mild Pain (2) Comment: Pain in abdomin just off and on   No LMP recorded. Patient is postmenopausal.  Allergies reviewed: Yes  Medications reviewed: Yes    Medications: Medication refills not needed today.  Pharmacy name entered into [employer]: [facility] - [place], [place] - [address]    Clinical concerns: none Dr Metz was NOT notified.    9 minutes for nursing intake (face to face time)     PATRICIA DUARTE LPN            "

## 2017-11-27 NOTE — LETTER
11/27/2017       RE: Kitty Bangura  [address] [address]     Dear Colleague,    Thank you for referring your patient, Kitty Bangura, to the [facility] at [facility]. Please see a copy of my visit note below.    SUBJECTIVE:  The patient is a 56-year-old white female who I was asked to see in consultation at the request of Dr. Vito Sanches and Dr. Belen Lord for evaluation of a recent episode of necrotizing pancreatitis complicated by walled off necrosis.      The patient presented with sudden onset severe incapacitating acute abdominal pain on 11/01/2017.  She was admitted to [facility] and found to have marked elevation of lipase to 41,960.  She developed sirs and a pleural effusion and thus had a total BISAP score of 2.  Her renal function and respiratory function remained intact without evidence of organ failure.  The etiology of her pancreatitis was somewhat unclear, although she did report regular alcohol intake of 1-2 glasses of wine per night.  Otherwise, evaluation showed a normal triglyceride level, low to normal calcium level.  She was on no medications prior to admission to suggest drug induced pancreatitis.  She had an ultrasound which showed no evidence of gallstone disease.  Subsequent MRCP also showed no evidence of gallstone disease.  During that hospitalization, her liver functions were elevated with her total bilirubin rising up to as high as 5.3.  Her AST was slightly elevated but less than twice upper normal and her ALT was normal.  This appears to have resolved spontaneously, although the labs have not been repeated and will be performed today.      The patient denies any prior history of pancreatitis although she does describe having brief severe abdominal pain in 2006 for which no etiology was determined.  In addition, she describes a vague upper abdominal pain intermittently for the past 5  "months but nothing near as severe as this recent hospitalization.      Imaging during her hospitalization showed evolving necrotizing pancreatitis involving the mid body and neck of the pancreas.  Subsequently, she was discharged and had followup imaging on 11/20 which shows resolution of the peripancreatic inflammatory changes and development of a large area of relatively mature appearing walled off necrosis measuring 8.6u04y14 cm in diameter.      Of note, the patient brings with her an MRI of the back report from 04/2004 where there was mention of a 2.5 cm rounded focus of increased T2 signal in the region of the pancreatic tail.  This was incompletely visualized and CT was recommended for further evaluation, although the patient never had it.  On her more recent imaging there is a mature appearing cystic lesion to the left of the area of walled off necrosis which appears to have a partially calcified wall and a mixed solid and cystic component which likely corresponds to this previous imaging finding.      The patient continues to have intermittent, sharp left upper quadrant and epigastric pain which she describes as a pressure-like sensation.  This limits her ability to eat, although she has not been losing weight.  She also states that it bothers her while walking.  She is not requiring any analgesic medications at present.  She denies diarrhea but is taking Creon 24,000 units, 3 with meals and 1 with snacks.  She states prior to the enzyme she was having alternating constipation and diarrhea.  She is not currently taking any medications for blood sugar.      PAST MEDICAL HISTORY:  Otherwise unremarkable.      PAST SURGICAL HISTORY:  Negative.      ALLERGIES/MEDICATIONS:  Reviewed and documented in Epic.  Again, not taking any pain medications at present.      FAMILY HISTORY:  Negative for pancreatic duct disease other than she believes that her maternal grandfather may have had some \"pancreas issue.\"  This " was not felt to be malignant.  He was also diabetic and required dialysis.      SOCIAL HISTORY:  The patient describes a regular alcohol use, 1-2 glasses of wine per night and more parties up until the onset of her pancreatitis.  She reports a total abstinent since then.  There is no history of tobacco use.      REVIEW OF SYSTEMS:  See history of present illness and patient self-reported inventory which is attached to this chart.      PHYSICAL EXAMINATION:   GENERAL:  The patient is well-appearing in no acute distress.   HEENT:  Exam shows no evidence of scleral icterus.   LUNGS:  Clear to auscultation.   CARDIAC:  Exam shows a regular rate and rhythm without murmur, gallop or rub.   ABDOMEN:  Shows mild diffuse tenderness to palpation in all 4 quadrants.  There is more significant tenderness to light palpation in the epigastrium with fullness in this region.  There is no rebound, guarding or rigidity.  There is no palpable hepatosplenomegaly.  Bowel sounds are normoactive.   EXTREMITIES:  No clubbing, cyanosis or edema.   SKIN:  Shows no jaundice.   PSYCH:  Her affect is bright and appropriate.      ASSESSMENT AND PLAN:     1.  Walled off necrosis.  The patient has ongoing symptoms with significant epigastric tenderness and intermittent pain with walking and interfering with her oral intake.  We discussed management options for this which included endoscopic transluminal drainage and subsequent necrosectomy, surgical necrosectomy, and conservative management.  She is unwilling to consider further course of conservative management given the degree of her pain.  She requested that we proceed as scheduled with endoscopic drainage.  This was already scheduled this Wednesday.  The risks were discussed in detail including risk of infection, perforation, leakage and bleeding.  We discussed that there is a small chance she will require admission for observation.  We also discussed that she will need ongoing endoscopic  debridement which on average may require an additional 3-4 endoscopic procedures.  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  As such, we will eventually exchange her metal transgastric stents for a permanent plastic double pigtail stent which would be left in place long-term.   2.  The patient discussed issues related to the cost of her pancreatic enzyme supplements.  We did help her with the patient assistance form but also I would recommend that we check a fecal elastase to see if she in fact needs these long-term.   3.  It appears her jaundice has resolved but will recheck liver function tests today to confirm that these have normalized.  The etiology of this is somewhat unclear.  She did have a low-grade AST elevation and I question whether her alcohol ingestion history may be more significant than she is reporting which may also be an etiology for her pancreatitis.  In any event, we will examine her common bile duct with the endoscopic ultrasound at the time of her procedure.   4.  I will check a glycosylated hemoglobin and glucose level today to assess for possible diabetes mellitus.      We will arrange for further followup at the time of her procedure and after discussion with Dr. Sanches.  Again, most of her followup will be performed at the time of scheduled endoscopic debridement sessions.         Again, thank you for allowing me to participate in the care of your patient.      Sincerely,    González Metz MD

## 2017-11-28 ENCOUNTER — ANESTHESIA EVENT (OUTPATIENT)
Dept: SURGERY | Facility: CLINIC | Age: 57
End: 2017-11-28

## 2017-11-29 ENCOUNTER — ANESTHESIA (OUTPATIENT)
Dept: SURGERY | Facility: CLINIC | Age: 57
End: 2017-11-29

## 2017-11-29 ENCOUNTER — APPOINTMENT (OUTPATIENT)
Dept: GENERAL RADIOLOGY | Facility: CLINIC | Age: 57
End: 2017-11-29
Attending: INTERNAL MEDICINE

## 2017-11-29 ENCOUNTER — HOSPITAL ENCOUNTER (OUTPATIENT)
Facility: CLINIC | Age: 57
Discharge: HOME | End: 2017-11-29
Attending: INTERNAL MEDICINE | Admitting: INTERNAL MEDICINE

## 2017-11-29 VITALS
RESPIRATION RATE: 16 BRPM | HEART RATE: 77 BPM | SYSTOLIC BLOOD PRESSURE: 105 MMHG | WEIGHT: 176.59 LBS | OXYGEN SATURATION: 96 % | HEIGHT: 65 IN | TEMPERATURE: 98 F | DIASTOLIC BLOOD PRESSURE: 66 MMHG | BODY MASS INDEX: 29.42 KG/M2

## 2017-11-29 DIAGNOSIS — K85.91 NECROTIZING PANCREATITIS: ICD-10-CM

## 2017-11-29 DIAGNOSIS — K86.2 PANCREAS CYST: Primary | ICD-10-CM

## 2017-11-29 LAB
GLUCOSE BLDC GLUCOMTR-MCNC: 113 MG/DL (ref 70–99)
UPPER EUS: NORMAL

## 2017-11-29 PROCEDURE — 37000009 ZZH ANESTHESIA TECHNICAL FEE, EACH ADDTL 15 MIN: Performed by: INTERNAL MEDICINE

## 2017-11-29 PROCEDURE — 25000566 ZZH SEVOFLURANE, EA 15 MIN: Performed by: INTERNAL MEDICINE

## 2017-11-29 PROCEDURE — 88172 CYTP DX EVAL FNA 1ST EA SITE: CPT | Performed by: INTERNAL MEDICINE

## 2017-11-29 PROCEDURE — 88305 TISSUE EXAM BY PATHOLOGIST: CPT | Performed by: INTERNAL MEDICINE

## 2017-11-29 PROCEDURE — C1726 CATH, BAL DIL, NON-VASCULAR: HCPCS | Performed by: INTERNAL MEDICINE

## 2017-11-29 PROCEDURE — 36000057 ZZH SURGERY LEVEL 3 1ST 30 MIN - UMMC: Performed by: INTERNAL MEDICINE

## 2017-11-29 PROCEDURE — 25500064 ZZH RX 255 OP 636: Performed by: INTERNAL MEDICINE

## 2017-11-29 PROCEDURE — C9399 UNCLASSIFIED DRUGS OR BIOLOG: HCPCS | Performed by: NURSE ANESTHETIST, CERTIFIED REGISTERED

## 2017-11-29 PROCEDURE — 36000059 ZZH SURGERY LEVEL 3 EA 15 ADDTL MIN UMMC: Performed by: INTERNAL MEDICINE

## 2017-11-29 PROCEDURE — 00000155 ZZHCL STATISTIC H-CELL BLOCK W/STAIN: Performed by: INTERNAL MEDICINE

## 2017-11-29 PROCEDURE — 37000008 ZZH ANESTHESIA TECHNICAL FEE, 1ST 30 MIN: Performed by: INTERNAL MEDICINE

## 2017-11-29 PROCEDURE — 25000128 H RX IP 250 OP 636: Performed by: NURSE ANESTHETIST, CERTIFIED REGISTERED

## 2017-11-29 PROCEDURE — 83520 IMMUNOASSAY QUANT NOS NONAB: CPT | Performed by: INTERNAL MEDICINE

## 2017-11-29 PROCEDURE — 71000012 ZZH RECOVERY PHASE 1 LEVEL 1 FIRST HR: Performed by: INTERNAL MEDICINE

## 2017-11-29 PROCEDURE — 71000027 ZZH RECOVERY PHASE 2 EACH 15 MINS: Performed by: INTERNAL MEDICINE

## 2017-11-29 PROCEDURE — 40000170 ZZH STATISTIC PRE-PROCEDURE ASSESSMENT II: Performed by: INTERNAL MEDICINE

## 2017-11-29 PROCEDURE — 25000125 ZZHC RX 250: Performed by: NURSE ANESTHETIST, CERTIFIED REGISTERED

## 2017-11-29 PROCEDURE — 88173 CYTOPATH EVAL FNA REPORT: CPT | Performed by: INTERNAL MEDICINE

## 2017-11-29 PROCEDURE — 40000277 XR SURGERY CARM FLUORO LESS THAN 5 MIN W STILLS: Mod: TC

## 2017-11-29 PROCEDURE — 25000128 H RX IP 250 OP 636: Performed by: ANESTHESIOLOGY

## 2017-11-29 PROCEDURE — C1876 STENT, NON-COA/NON-COV W/DEL: HCPCS | Performed by: INTERNAL MEDICINE

## 2017-11-29 PROCEDURE — 82962 GLUCOSE BLOOD TEST: CPT

## 2017-11-29 PROCEDURE — 25000125 ZZHC RX 250: Performed by: INTERNAL MEDICINE

## 2017-11-29 PROCEDURE — 25000125 ZZHC RX 250: Performed by: ANESTHESIOLOGY

## 2017-11-29 PROCEDURE — 27210794 ZZH OR GENERAL SUPPLY STERILE: Performed by: INTERNAL MEDICINE

## 2017-11-29 RX ORDER — LEVOFLOXACIN 5 MG/ML
INJECTION, SOLUTION INTRAVENOUS PRN
Status: DISCONTINUED | OUTPATIENT
Start: 2017-11-29 | End: 2017-11-29

## 2017-11-29 RX ORDER — ONDANSETRON 2 MG/ML
INJECTION INTRAMUSCULAR; INTRAVENOUS PRN
Status: DISCONTINUED | OUTPATIENT
Start: 2017-11-29 | End: 2017-11-29

## 2017-11-29 RX ORDER — LIDOCAINE 40 MG/G
CREAM TOPICAL
Status: DISCONTINUED | OUTPATIENT
Start: 2017-11-29 | End: 2017-11-29 | Stop reason: HOSPADM

## 2017-11-29 RX ORDER — FENTANYL CITRATE 50 UG/ML
INJECTION, SOLUTION INTRAMUSCULAR; INTRAVENOUS PRN
Status: DISCONTINUED | OUTPATIENT
Start: 2017-11-29 | End: 2017-11-29

## 2017-11-29 RX ORDER — DEXAMETHASONE SODIUM PHOSPHATE 4 MG/ML
INJECTION, SOLUTION INTRA-ARTICULAR; INTRALESIONAL; INTRAMUSCULAR; INTRAVENOUS; SOFT TISSUE PRN
Status: DISCONTINUED | OUTPATIENT
Start: 2017-11-29 | End: 2017-11-29

## 2017-11-29 RX ORDER — LEVOFLOXACIN 500 MG/1
500 TABLET, FILM COATED ORAL DAILY
Qty: 7 TABLET | Refills: 0 | Status: SHIPPED | OUTPATIENT
Start: 2017-11-29 | End: 2017-12-09

## 2017-11-29 RX ORDER — EPHEDRINE SULFATE 50 MG/ML
INJECTION, SOLUTION INTRAMUSCULAR; INTRAVENOUS; SUBCUTANEOUS PRN
Status: DISCONTINUED | OUTPATIENT
Start: 2017-11-29 | End: 2017-11-29

## 2017-11-29 RX ORDER — ONDANSETRON 4 MG/1
4 TABLET, ORALLY DISINTEGRATING ORAL EVERY 30 MIN PRN
Status: DISCONTINUED | OUTPATIENT
Start: 2017-11-29 | End: 2017-11-29 | Stop reason: HOSPADM

## 2017-11-29 RX ORDER — LIDOCAINE HYDROCHLORIDE 20 MG/ML
INJECTION, SOLUTION INFILTRATION; PERINEURAL PRN
Status: DISCONTINUED | OUTPATIENT
Start: 2017-11-29 | End: 2017-11-29

## 2017-11-29 RX ORDER — IOPAMIDOL 510 MG/ML
INJECTION, SOLUTION INTRAVASCULAR PRN
Status: DISCONTINUED | OUTPATIENT
Start: 2017-11-29 | End: 2017-11-29 | Stop reason: HOSPADM

## 2017-11-29 RX ORDER — MEPERIDINE HYDROCHLORIDE 25 MG/ML
12.5 INJECTION INTRAMUSCULAR; INTRAVENOUS; SUBCUTANEOUS
Status: DISCONTINUED | OUTPATIENT
Start: 2017-11-29 | End: 2017-11-29 | Stop reason: HOSPADM

## 2017-11-29 RX ORDER — NALOXONE HYDROCHLORIDE 0.4 MG/ML
.1-.4 INJECTION, SOLUTION INTRAMUSCULAR; INTRAVENOUS; SUBCUTANEOUS
Status: DISCONTINUED | OUTPATIENT
Start: 2017-11-29 | End: 2017-11-29 | Stop reason: HOSPADM

## 2017-11-29 RX ORDER — HYDROMORPHONE HYDROCHLORIDE 1 MG/ML
.3-.5 INJECTION, SOLUTION INTRAMUSCULAR; INTRAVENOUS; SUBCUTANEOUS EVERY 10 MIN PRN
Status: DISCONTINUED | OUTPATIENT
Start: 2017-11-29 | End: 2017-11-29 | Stop reason: HOSPADM

## 2017-11-29 RX ORDER — SODIUM CHLORIDE, SODIUM LACTATE, POTASSIUM CHLORIDE, CALCIUM CHLORIDE 600; 310; 30; 20 MG/100ML; MG/100ML; MG/100ML; MG/100ML
INJECTION, SOLUTION INTRAVENOUS CONTINUOUS
Status: DISCONTINUED | OUTPATIENT
Start: 2017-11-29 | End: 2017-11-29 | Stop reason: HOSPADM

## 2017-11-29 RX ORDER — FLUMAZENIL 0.1 MG/ML
0.2 INJECTION, SOLUTION INTRAVENOUS
Status: DISCONTINUED | OUTPATIENT
Start: 2017-11-29 | End: 2017-11-29 | Stop reason: HOSPADM

## 2017-11-29 RX ORDER — SCOLOPAMINE TRANSDERMAL SYSTEM 1 MG/1
1 PATCH, EXTENDED RELEASE TRANSDERMAL ONCE
Status: COMPLETED | OUTPATIENT
Start: 2017-11-29 | End: 2017-11-29

## 2017-11-29 RX ORDER — FENTANYL CITRATE 50 UG/ML
25-50 INJECTION, SOLUTION INTRAMUSCULAR; INTRAVENOUS
Status: DISCONTINUED | OUTPATIENT
Start: 2017-11-29 | End: 2017-11-29 | Stop reason: HOSPADM

## 2017-11-29 RX ORDER — DEXAMETHASONE SODIUM PHOSPHATE 4 MG/ML
4 INJECTION, SOLUTION INTRA-ARTICULAR; INTRALESIONAL; INTRAMUSCULAR; INTRAVENOUS; SOFT TISSUE EVERY 10 MIN PRN
Status: DISCONTINUED | OUTPATIENT
Start: 2017-11-29 | End: 2017-11-29 | Stop reason: HOSPADM

## 2017-11-29 RX ORDER — PROPOFOL 10 MG/ML
INJECTION, EMULSION INTRAVENOUS PRN
Status: DISCONTINUED | OUTPATIENT
Start: 2017-11-29 | End: 2017-11-29

## 2017-11-29 RX ORDER — ONDANSETRON 2 MG/ML
4 INJECTION INTRAMUSCULAR; INTRAVENOUS EVERY 30 MIN PRN
Status: DISCONTINUED | OUTPATIENT
Start: 2017-11-29 | End: 2017-11-29 | Stop reason: HOSPADM

## 2017-11-29 RX ORDER — FENTANYL CITRATE 50 UG/ML
25-50 INJECTION, SOLUTION INTRAMUSCULAR; INTRAVENOUS EVERY 5 MIN PRN
Status: DISCONTINUED | OUTPATIENT
Start: 2017-11-29 | End: 2017-11-29 | Stop reason: HOSPADM

## 2017-11-29 RX ADMIN — PHENYLEPHRINE HYDROCHLORIDE 100 MCG: 10 INJECTION, SOLUTION INTRAMUSCULAR; INTRAVENOUS; SUBCUTANEOUS at 09:29

## 2017-11-29 RX ADMIN — PHENYLEPHRINE HYDROCHLORIDE 100 MCG: 10 INJECTION, SOLUTION INTRAMUSCULAR; INTRAVENOUS; SUBCUTANEOUS at 09:17

## 2017-11-29 RX ADMIN — FENTANYL CITRATE 100 MCG: 50 INJECTION, SOLUTION INTRAMUSCULAR; INTRAVENOUS at 08:23

## 2017-11-29 RX ADMIN — MIDAZOLAM HYDROCHLORIDE 1 MG: 1 INJECTION, SOLUTION INTRAMUSCULAR; INTRAVENOUS at 08:10

## 2017-11-29 RX ADMIN — PROPOFOL 30 MG: 10 INJECTION, EMULSION INTRAVENOUS at 08:38

## 2017-11-29 RX ADMIN — PHENYLEPHRINE HYDROCHLORIDE 50 MCG: 10 INJECTION, SOLUTION INTRAMUSCULAR; INTRAVENOUS; SUBCUTANEOUS at 09:53

## 2017-11-29 RX ADMIN — Medication 5 MG: at 09:02

## 2017-11-29 RX ADMIN — Medication 5 MG: at 09:53

## 2017-11-29 RX ADMIN — PHENYLEPHRINE HYDROCHLORIDE 100 MCG: 10 INJECTION, SOLUTION INTRAMUSCULAR; INTRAVENOUS; SUBCUTANEOUS at 08:56

## 2017-11-29 RX ADMIN — Medication 5 MG: at 08:56

## 2017-11-29 RX ADMIN — LEVOFLOXACIN 500 MG: 5 INJECTION, SOLUTION INTRAVENOUS at 08:45

## 2017-11-29 RX ADMIN — ONDANSETRON 4 MG: 2 INJECTION INTRAMUSCULAR; INTRAVENOUS at 09:38

## 2017-11-29 RX ADMIN — PHENYLEPHRINE HYDROCHLORIDE 50 MCG: 10 INJECTION, SOLUTION INTRAMUSCULAR; INTRAVENOUS; SUBCUTANEOUS at 09:08

## 2017-11-29 RX ADMIN — PHENYLEPHRINE HYDROCHLORIDE 100 MCG: 10 INJECTION, SOLUTION INTRAMUSCULAR; INTRAVENOUS; SUBCUTANEOUS at 08:45

## 2017-11-29 RX ADMIN — PROPOFOL 150 MG: 10 INJECTION, EMULSION INTRAVENOUS at 08:23

## 2017-11-29 RX ADMIN — SCOPALAMINE 1 PATCH: 1 PATCH, EXTENDED RELEASE TRANSDERMAL at 07:36

## 2017-11-29 RX ADMIN — SUGAMMADEX 200 MG: 100 INJECTION, SOLUTION INTRAVENOUS at 09:57

## 2017-11-29 RX ADMIN — DEXAMETHASONE SODIUM PHOSPHATE 6 MG: 4 INJECTION, SOLUTION INTRA-ARTICULAR; INTRALESIONAL; INTRAMUSCULAR; INTRAVENOUS; SOFT TISSUE at 08:54

## 2017-11-29 RX ADMIN — PHENYLEPHRINE HYDROCHLORIDE 100 MCG: 10 INJECTION, SOLUTION INTRAMUSCULAR; INTRAVENOUS; SUBCUTANEOUS at 08:49

## 2017-11-29 RX ADMIN — PHENYLEPHRINE HYDROCHLORIDE 200 MCG: 10 INJECTION, SOLUTION INTRAMUSCULAR; INTRAVENOUS; SUBCUTANEOUS at 08:30

## 2017-11-29 RX ADMIN — PHENYLEPHRINE HYDROCHLORIDE 50 MCG: 10 INJECTION, SOLUTION INTRAMUSCULAR; INTRAVENOUS; SUBCUTANEOUS at 09:02

## 2017-11-29 RX ADMIN — FENTANYL CITRATE 25 MCG: 50 INJECTION, SOLUTION INTRAMUSCULAR; INTRAVENOUS at 09:41

## 2017-11-29 RX ADMIN — SODIUM CHLORIDE, POTASSIUM CHLORIDE, SODIUM LACTATE AND CALCIUM CHLORIDE: 600; 310; 30; 20 INJECTION, SOLUTION INTRAVENOUS at 08:10

## 2017-11-29 RX ADMIN — ROCURONIUM BROMIDE 40 MG: 10 INJECTION INTRAVENOUS at 08:23

## 2017-11-29 RX ADMIN — Medication 5 MG: at 09:08

## 2017-11-29 RX ADMIN — MIDAZOLAM HYDROCHLORIDE 1 MG: 1 INJECTION, SOLUTION INTRAMUSCULAR; INTRAVENOUS at 08:20

## 2017-11-29 RX ADMIN — LIDOCAINE HYDROCHLORIDE 100 MG: 20 INJECTION, SOLUTION INFILTRATION; PERINEURAL at 08:23

## 2017-11-29 NOTE — ANESTHESIA CARE TRANSFER NOTE
Patient: Kitty Bangura    Procedure(s):  Endoscopic Ultrasound with cystgastrostomy and fine needle aspiration  - Wound Class: II-Clean Contaminated    Diagnosis: Necrotizing Pancreatitis   Diagnosis Additional Information: No value filed.    Anesthesia Type:   General, ETT     Note:  Airway :Face Mask  Patient transferred to:PACU  Comments: To PACU via facemask and hi flow oxygen. VSS. Denies pain or nausea.  Report given to RN at bedside.Handoff Report: Identifed the Patient, Identified the Reponsible Provider, Reviewed the pertinent medical history, Discussed the surgical course, Reviewed Intra-OP anesthesia mangement and issues during anesthesia, Set expectations for post-procedure period and Allowed opportunity for questions and acknowledgement of understanding      Vitals: (Last set prior to Anesthesia Care Transfer)    CRNA VITALS  11/29/2017 0938 - 11/29/2017 1013      11/29/2017             NIBP: 101/64    Pulse: 78                Electronically Signed By: TYLER Hawley CRNA  November 29, 2017  10:13 AM

## 2017-11-29 NOTE — OR NURSING
Dr. Metz with gasteroenterology at bedside in phase 2 talking with pt and  regarding procedure and findings.

## 2017-11-29 NOTE — DISCHARGE INSTRUCTIONS
[facility]  Same-Day Surgery   Adult Discharge Orders & Instructions     For 24 hours after surgery    1. Get plenty of rest.  A responsible adult must stay with you for at least 24 hours after you leave the hospital.   2. Do not drive or use heavy equipment.  If you have weakness or tingling, don't drive or use heavy equipment until this feeling goes away.  3. Do not drink alcohol.  4. Avoid strenuous or risky activities.  Ask for help when climbing stairs.   5. You may feel lightheaded.  IF so, sit for a few minutes before standing.  Have someone help you get up.   6. If you have nausea (feel sick to your stomach): Drink only clear liquids such as apple juice, ginger ale, broth or 7-Up.  Rest may also help.  Be sure to drink enough fluids.  Move to a regular diet as you feel able.  7. You may have a slight fever. Call the doctor if your fever is over 100 F (37.7 C) (taken under the tongue) or lasts longer than 24 hours.  8. You may have a dry mouth, a sore throat, muscle aches or trouble sleeping.  These should go away after 24 hours.  9. Do not make important or legal decisions.   Call your doctor for any of the followin.  Signs of infection (fever, growing tenderness at the surgery site, a large amount of drainage or bleeding, severe pain, foul-smelling drainage, redness, swelling).    2. It has been over 8 to 10 hours since surgery and you are still not able to urinate (pass water).    3.  Headache for over 24 hours.      To contact a doctor, call Dr Metz's office at [phone] or:        [phone] and ask for the resident on call for gasteroenterology (answered 24 hours a day)      Emergency Department:    [facility]: [phone]       (TTY for hearing impaired: [phone])

## 2017-11-29 NOTE — IP AVS SNAPSHOT
Post Anesthesia Care Unit [address] [address]    Phone:  [phone]                                       After Visit Summary   11/29/2017    Kitty Bangura    MRN: [id number]           After Visit Summary Signature Page     I have received my discharge instructions, and my questions have been answered. I have discussed any challenges I see with this plan with the nurse or doctor.    ..........................................................................................................................................  Patient/Patient Representative Signature      ..........................................................................................................................................  Patient Representative Print Name and Relationship to Patient    ..................................................               ................................................  Date                                            Time    ..........................................................................................................................................  Reviewed by Signature/Title    ...................................................              ..............................................  Date                                                            Time

## 2017-11-29 NOTE — ANESTHESIA POSTPROCEDURE EVALUATION
Patient: Kitty Bangura    Procedure(s):  Endoscopic Ultrasound with cystgastrostomy and fine needle aspiration  - Wound Class: II-Clean Contaminated    Diagnosis:Necrotizing Pancreatitis   Diagnosis Additional Information: No value filed.    Anesthesia Type:  General, ETT    Note:  Anesthesia Post Evaluation    Patient location during evaluation: PACU  Patient participation: Able to fully participate in evaluation  Level of consciousness: awake and alert  Pain management: adequate  Airway patency: patent  Cardiovascular status: acceptable  Respiratory status: acceptable  Hydration status: acceptable  PONV: none     Anesthetic complications: None          Last vitals:  Vitals:    11/29/17 1030 11/29/17 1045 11/29/17 1100   BP: 94/65 101/70 100/63   Pulse:      Resp: 15 16 15   Temp:      SpO2: 98% 99% 94%         Electronically Signed By: Stanley Andujar MD  November 29, 2017  11:24 AM

## 2017-11-29 NOTE — OR NURSING
Patient arrived PACU with scopolamine patch behind right ear-denied nausea throughout stay.  Patient stated she had small amount of pressure in epigastric area which she had preop.  She stated no pain medication needed

## 2017-11-29 NOTE — BRIEF OP NOTE
[facility], [place]    Brief Operative Note    Pre-operative diagnosis: Necrotizing Pancreatitis   Post-operative diagnosis *Same, encapsulated solid and cystic lesion in pancreatic tail *  Procedure: Procedure(s):  Endoscopic Ultrasound with cystgastrostomy and fine needle aspiration  - Wound Class: II-Clean Contaminated  Surgeon: Surgeon(s) and Role:     * González Metz MD - Primary     * Davonte Christian MD - Assisting  Anesthesia: General   Estimated blood loss: None  Drains: None  Specimens:   ID Type Source Tests Collected by Time Destination   A : pancreatic tail mass Tissue Pancreas CYTOLOGY NON GYN González Metz MD 11/29/2017  9:51 AM      Findings:   Large 11 cm complex debris containing area of walled-off necrosis adjacent to proximal duodenum and stomach. Single 15 mm Axios stent placed across gastric wall and then dilated with 15 mm Elation balloon with excellent drainage of fluid. Initially planned to place second stent (prior to the balloon dilation) however the fluid component drained to the point of not allowing adequate sonographic visualization through the stomach and preferred to avoid transduodenal drainage due to increased risk of leakage or chronic fistula.    Separate 3.5 cm solid and cystic lesion in pancreatic tail which appeared encapsulated with partial rim calcification. Faint regions of internal Doppler signal. Needle biopsy x 4 with 22 ga Acquire needle with excellent cellularity and consistent with lesional tissue.    Features of chronic pancreatitis in small region of identifiable pancreatic parenchyma between the suspected cystic neoplasm and the walled-off necrosis.  Complications: None.  Implants: 15 mm Axios stent across gastric wall.    WINSTON Metz MD  Associate Professor of Medicine  Division of Gastroenterology, Hepatology and Nutrition  [facility]

## 2017-11-29 NOTE — OR NURSING
Dr Andujar called at 1055 for sign out. He stated he would come and see patient first before signing patient's postop note

## 2017-11-29 NOTE — IP AVS SNAPSHOT
MRN:[id number]                      After Visit Summary   11/29/2017    Kitty Bangura    MRN: [id number]           Thank you!     Thank you for choosing [facility] for your care. Our goal is always to provide you with excellent care. Hearing back from our patients is one way we can continue to improve our services. Please take a few minutes to complete the written survey that you may receive in the mail after you visit with us. Thank you!        Patient Information     Date Of Birth          1960        About your hospital stay     You were admitted on:  November 29, 2017 You last received care in the:  Post Anesthesia Care Unit [facility]    You were discharged on:  November 29, 2017       Who to Call     For medical emergencies, please call 911.  For non-urgent questions about your medical care, please call your primary care provider or clinic, [phone]  For questions related to your surgery, please call your surgery clinic        Attending Provider     Provider González Valenzuela MD Gastroenterology       Primary Care Provider Office Phone # Fax #    Josenargis Julito Mcgill [phone]      After Care Instructions     Discharge Instructions       No driving or operating machinery until the day after procedure.            Discharge Instructions       Recommend that a responsible adult remain with the patient at home for 24 hours post discharge.            Discharge Instructions       Start with clear liquids, sips of water 1 hour after procedure. If no abdominal pain and gag reflex has returned, advance as tolerated to pre-procedure diet.              Discharge Instructions       Restart home medications.            Discharge Instructions       Check with your Provider when to start anticoagulant medication.            Discharge Instructions       No ALCOHOL 24 hours post procedure.            NPO for Medical/Clinical Reasons                  Further instructions from your care team       [facility] [place]  Same-Day Surgery   Adult Discharge Orders & Instructions     For 24 hours after surgery    1. Get plenty of rest.  A responsible adult must stay with you for at least 24 hours after you leave the hospital.   2. Do not drive or use heavy equipment.  If you have weakness or tingling, don't drive or use heavy equipment until this feeling goes away.  3. Do not drink alcohol.  4. Avoid strenuous or risky activities.  Ask for help when climbing stairs.   5. You may feel lightheaded.  IF so, sit for a few minutes before standing.  Have someone help you get up.   6. If you have nausea (feel sick to your stomach): Drink only clear liquids such as apple juice, ginger ale, broth or 7-Up.  Rest may also help.  Be sure to drink enough fluids.  Move to a regular diet as you feel able.  7. You may have a slight fever. Call the doctor if your fever is over 100 F (37.7 C) (taken under the tongue) or lasts longer than 24 hours.  8. You may have a dry mouth, a sore throat, muscle aches or trouble sleeping.  These should go away after 24 hours.  9. Do not make important or legal decisions.   Call your doctor for any of the followin.  Signs of infection (fever, growing tenderness at the surgery site, a large amount of drainage or bleeding, severe pain, foul-smelling drainage, redness, swelling).    2. It has been over 8 to 10 hours since surgery and you are still not able to urinate (pass water).    3.  Headache for over 24 hours.      To contact a doctor, call Dr Metz's office at [phone] or:        [phone] and ask for the resident on call for gasteroenterology (answered 24 hours a day)      Emergency Department:    [facility]: [phone]       (TTY for hearing impaired: [phone])          Additional Information     If you use hormonal birth control (such as the pill, patch, ring or implants): You'll need a  "second form of birth control for 7 days (condoms, a diaphragm or contraceptive foam). While in the hospital, you received a medicine called Bridion. Your normal birth control will not work as well for a week after taking this medicine.          Pending Results     Date and Time Order Name Status Description    11/29/2017 0727 Pancreatic Elastase Fecal In process             Admission Information     Date & Time Provider Department Dept. Phone    11/29/2017 González Metz MD Post Anesthesia Care Unit [facility] [phone]      Your Vitals Were     Blood Pressure Pulse Temperature Respirations Height Weight    99/74 77 98.2  F (36.8  C) (Temporal) 14 1.651 m (5' 5\") 80.1 kg (176 lb 9.4 oz)    Pulse Oximetry BMI (Body Mass Index)                94% 29.39 kg/m2          MyChart Information     [employer] gives you secure access to your electronic health record. If you see a primary care provider, you can also send messages to your care team and make appointments. If you have questions, please call your primary care clinic.  If you do not have a primary care provider, please call [phone] and they will assist you.        Care EveryWhere ID     This is your Care EveryWhere ID. This could be used by other organizations to access your [facility] medical records  [id number]        Equal Access to Services     CHASE DE GUZMAN : Tre chino Sopaulo, waaxda luqadaha, qaybta kaalmada adeegyada, re yip. So [facility] [phone].    ATENCIÓN: Si habla español, tiene a quiros disposición servicios gratuitos de asistencia lingüística. Llame al [phone].    We comply with applicable federal civil rights laws and [place] laws. We do not discriminate on the basis of race, color, national origin, age, disability, sex, sexual orientation, or gender identity.               Review of your medicines      START taking        Dose / Directions    levofloxacin 500 MG tablet   Commonly known " as:  LEVAQUIN   Used for:  Pancreas cyst, Necrotizing pancreatitis        Dose:  500 mg   Take 1 tablet (500 mg) by mouth daily   Quantity:  7 tablet   Refills:  0         CONTINUE these medicines which have NOT CHANGED        Dose / Directions    acetaminophen 325 MG tablet   Commonly known as:  TYLENOL   Used for:  Acute pancreatitis without infection or necrosis, unspecified pancreatitis type        Dose:  650 mg   Take 2 tablets (650 mg) by mouth every 4 hours as needed for mild pain   Quantity:  100 tablet   Refills:  0       amylase-lipase-protease [id number] UNITS Cpep per EC capsule   Commonly known as:  CREON   Used for:  Necrotizing pancreatitis        Take 2-3 with meals / 1-2 with snacks, up to 15 per day.   Quantity:  450 capsule   Refills:  6       HYDROcodone-acetaminophen 5-325 MG per tablet   Commonly known as:  NORCO   Used for:  Acute pancreatitis without infection or necrosis, unspecified pancreatitis type, Acute pancreatitis with uninfected necrosis, unspecified pancreatitis type        Dose:  0.5-1 tablet   Take 0.5-1 tablets by mouth every 6 hours as needed for moderate to severe pain   Quantity:  10 tablet   Refills:  0            Where to get your medicines      These medications were sent to [facility] [place], [place] - [address]  [address] [address]     Phone:  [phone]     levofloxacin 500 MG tablet               ANTIBIOTIC INSTRUCTION     You've Been Prescribed an Antibiotic - Now What?  Your healthcare team thinks that you or your loved one might have an infection. Some infections can be treated with antibiotics, which are powerful, life-saving drugs. Like all medications, antibiotics have side effects and should only be used when necessary. There are some important things you should know about your antibiotic treatment.      Your healthcare team may run tests before you start taking an antibiotic.    Your team may take samples  (e.g., from your blood, urine or other areas) to run tests to look for bacteria. These test can be important to determine if you need an antibiotic at all and, if you do, which antibiotic will work best.      Within a few days, your healthcare team might change or even stop your antibiotic.    Your team may start you on an antibiotic while they are working to find out what is making you sick.    Your team might change your antibiotic because test results show that a different antibiotic would be better to treat your infection.    In some cases, once your team has more information, they learn that you do not need an antibiotic at all. They may find out that you don't have an infection, or that the antibiotic you're taking won't work against your infection. For example, an infection caused by a virus can't be treated with antibiotics. Staying on an antibiotic when you don't need it is more likely to be harmful than helpful.      You may experience side effects from your antibiotic.    Like all medications, antibiotics have side effects. Some of these can be serious.    Let you healthcare team know if you have any known allergies when you are admitted to the hospital.    One significant side effect of nearly all antibiotics is the risk of severe and sometimes deadly diarrhea caused by Clostridium difficile (C. Difficile). This occurs when a person takes antibiotics because some good germs are destroyed. Antibiotic use allows C. diificile to take over, putting patients at high risk for this serious infection.    As a patient or caregiver, it is important to understand your or your loved one's antibiotic treatment. It is especially important for caregivers to speak up when patients can't speak for themselves. Here are some important questions to ask your healthcare team.    What infection is this antibiotic treating and how do you know I have that infection?    What side effects might occur from this antibiotic?    How  long will I need to take this antibiotic?    Is it safe to take this antibiotic with other medications or supplements (e.g., vitamins) that I am taking?     Are there any special directions I need to know about taking this antibiotic? For example, should I take it with food?    How will I be monitored to know whether my infection is responding to the antibiotic?    What tests may help to make sure the right antibiotic is prescribed for me?      Information provided by:  www.cdc.gov/getsmart  U.S. Department of Health and Human Services  Centers for disease Control and Prevention  National Center for Emerging and Zoonotic Infectious Diseases  Division of Healthcare Quality Promotion         Protect others around you: Learn how to safely use, store and throw away your medicines at www.disposemymeds.org.             Medication List: This is a list of all your medications and when to take them. Check marks below indicate your daily home schedule. Keep this list as a reference.      Medications           Morning Afternoon Evening Bedtime As Needed    acetaminophen 325 MG tablet   Commonly known as:  TYLENOL   Take 2 tablets (650 mg) by mouth every 4 hours as needed for mild pain                                amylase-lipase-protease [id number] UNITS Cpep per EC capsule   Commonly known as:  CREON   Take 2-3 with meals / 1-2 with snacks, up to 15 per day.                                HYDROcodone-acetaminophen 5-325 MG per tablet   Commonly known as:  NORCO   Take 0.5-1 tablets by mouth every 6 hours as needed for moderate to severe pain                                levofloxacin 500 MG tablet   Commonly known as:  LEVAQUIN   Take 1 tablet (500 mg) by mouth daily                                          More Information        Patient Education    Scopolamine Hydrobromide Ophthalmic drops, solution    Scopolamine Hydrobromide Oral tablet    Scopolamine Transdermal patch - 72 hour  Scopolamine Transdermal patch - 72  hour  What is this medicine?  SCOPOLAMINE (skoe CLARK a meen) is used to prevent nausea and vomiting caused by motion sickness, anesthesia and surgery.  This medicine may be used for other purposes; ask your health care provider or pharmacist if you have questions.  What should I tell my health care provider before I take this medicine?  They need to know if you have any of these conditions:    glaucoma    kidney or liver disease    an unusual or allergic reaction (especially skin allergy) to scopolamine, atropine, other medicines, foods, dyes, or preservatives    pregnant or trying to get pregnant    breast-feeding  How should I use this medicine?  This medicine is for external use only. Follow the directions on the prescription label. One patch contains enough medicine to prevent motion sickness for up to 3 days. Apply the patch at least 4 hours before you need it and only wear one disc at a time. Choose an area behind the ear, that is clean, dry, hairless and free from any cuts or irritation. Wipe the area with a clean dry tissue. Peel off the plastic backing of the skin patch, trying not to touch the adhesive side with your hands. Do not cut the patches. Firmly apply to the area you have chosen, with the metallic side of the patch to the skin and the tan-colored side showing. Once firmly in place, wash your hands well with soap and water. Remove the disc after 3 days, or sooner if you no longer need it. After removing the patch, wash your hands and the area behind your ear thoroughly with soap and water. The patch will still contain some medicine after use. To avoid accidental contact or ingestion by children or pets, fold the used patch in half with the sticky side together and throw away in the trash out of the reach of children and pets. If you need to use a second patch after you remove the first, place it behind the other ear.  Talk to your pediatrician regarding the use of this medicine in children. Special  care may be needed.  Overdosage: If you think you have taken too much of this medicine contact a poison control center or emergency room at once.  NOTE: This medicine is only for you. Do not share this medicine with others.  What if I miss a dose?  Make sure you apply the patch at least 4 hours before you need it. You can apply it the night before traveling.  What may interact with this medicine?    benztropine    bethanechol    medicines for anxiety or sleeping problems like diazepam or temazepam    medicines for hay fever and other allergies    medicines for mental depression    muscle relaxants  This list may not describe all possible interactions. Give your health care provider a list of all the medicines, herbs, non-prescription drugs, or dietary supplements you use. Also tell them if you smoke, drink alcohol, or use illegal drugs. Some items may interact with your medicine.  What should I watch for while using this medicine?  Keep the patch dry, if possible, to prevent it from falling off. Limited contact with water, however, as in bathing or swimming, will not affect the system. If the patch falls off, throw it away and put a new one behind the other ear.  You may get drowsy or dizzy. Do not drive, use machinery, or do anything that needs mental alertness until you know how this medicine affects you. Do not stand or sit up quickly, especially if you are an older patient. This reduces the risk of dizzy or fainting spells. Alcohol may interfere with the effect of this medicine. Avoid alcoholic drinks.  Your mouth may get dry. Chewing sugarless gum or sucking hard candy, and drinking plenty of water may help. Contact your doctor if the problem does not go away or is severe.  This medicine may cause dry eyes and blurred vision. If you wear contact lenses you may feel some discomfort. Lubricating drops may help. See your eye doctor if the problem does not go away or is severe.  If you are going to have a magnetic  resonance imaging (MRI) procedure, tell your MRI technician if you have this patch on your body. It must be removed before a MRI.  What side effects may I notice from receiving this medicine?  Side effects that you should report to your doctor or health care professional as soon as possible:    agitation, nervousness, confusion    blurred vision and other eye problems    dizziness, drowsiness    eye pain or redness in the whites of the eye    hallucinations    pain or difficulty passing urine    skin rash, itching    vomiting  Side effects that usually do not require medical attention (report to your doctor or health care professional if they continue or are bothersome):    headache    nausea  This list may not describe all possible side effects. Call your doctor for medical advice about side effects. You may report side effects to FDA at 7-633-FDA-7009.  Where should I keep my medicine?  Keep out of the reach of children.  Store at room temperature between 20 and 25 degrees C (68 and 77 degrees F). Throw away any unused medicine after the expiration date. When you remove a patch, fold it and throw it in the trash as described above.  NOTE: This sheet is a summary. It may not cover all possible information. If you have questions about this medicine, talk to your doctor, pharmacist, or health care provider.  NOTE:This sheet is a summary. It may not cover all possible information. If you have questions about this medicine, talk to your doctor, pharmacist, or health care provider. Copyright  2016 [employer]

## 2017-11-29 NOTE — ANESTHESIA PREPROCEDURE EVALUATION
Anesthesia Evaluation     . Pt has had prior anesthetic. Type: General    No history of anesthetic complications          ROS/MED HX    ENT/Pulmonary:  - neg pulmonary ROS     Neurologic:  - neg neurologic ROS     Cardiovascular:  - neg cardiovascular ROS       METS/Exercise Tolerance:  >4 METS   Hematologic:         Musculoskeletal:         GI/Hepatic: Comment: Necrotizing pancreatitis        Renal/Genitourinary:  - ROS Renal section negative       Endo:  - neg endo ROS       Psychiatric:         Infectious Disease:         Malignancy:         Other:                     Physical Exam  Normal systems: dental    Airway   Mallampati: II  TM distance: >3 FB  Neck ROM: full    Dental     Cardiovascular   Rhythm and rate: regular and normal  (-) no weak pulses and no murmur    Pulmonary    breath sounds clear to auscultation(-) no rhonchi                    Anesthesia Plan      History & Physical Review      ASA Status:  2 .    NPO Status:  > 8 hours and > 2 hours    Plan for General and ETT with Intravenous induction. Maintenance will be Balanced.      GETA. PIVx1. Standard ASA monitors. IV opioids. PACU postop.    Risks and benefits of anesthetic discussed with patient including sore throat, voice hoarseness, injury to vocal cords, throat, mouth, teeth, tongue, and lips from intubation; cardiac arrest, respiratory complications, MI, stroke, blood clots.    Presented opportunity to answer patient and family questions. Questions addressed.        Postoperative Care      Consents  Anesthetic plan, risks, benefits and alternatives discussed with:  Patient.  Use of blood products discussed: Yes.   Use of blood products discussed with Patient.  Consented to blood products.  .                          .

## 2017-11-30 ENCOUNTER — CARE COORDINATION (OUTPATIENT)
Dept: GASTROENTEROLOGY | Facility: CLINIC | Age: 57
End: 2017-11-30

## 2017-11-30 DIAGNOSIS — K85.91 NECROTIZING PANCREATITIS: Primary | ICD-10-CM

## 2017-11-30 LAB — COPATH REPORT: NORMAL

## 2017-11-30 NOTE — PROGRESS NOTES
Care Coordination Telephone Call  GI Service and Surgical Oncology    Called patient to discuss how she was feeling.  She relates very well.  Less pain and pressure than prior to EUS and necrosectomy.  Taking fluids without difficulty.    I have asked the patient to call with any additional questions or concerns and have provided my contact information.    Plan:  EUS again 12/4/17    Emmanuelle GILLETTEN, HNBC, STAR-T  RN Care Coordinator  Surgical Oncology and GI service  Ph: [phone]  FAX: [phone]

## 2017-12-01 ENCOUNTER — ANESTHESIA EVENT (OUTPATIENT)
Dept: SURGERY | Facility: CLINIC | Age: 57
End: 2017-12-01

## 2017-12-01 LAB — ELASTASE PANC STL-MCNT: 21 UG/G

## 2017-12-03 NOTE — ANESTHESIA PREPROCEDURE EVALUATION
Anesthesia Plan      History & Physical Review  History and physical reviewed and following examination; no interval change.    ASA Status:  2 .    NPO Status:  > 8 hours    Plan for General and ETT with Intravenous induction. Maintenance will be Balanced.    PONV prophylaxis:  Ondansetron (or other 5HT-3) and Dexamethasone or Solumedrol  Additional equipment: 2nd IV      Postoperative Care  Postoperative pain management:  IV analgesics.      Consents  Anesthetic plan, risks, benefits and alternatives discussed with:  Patient.  Use of blood products discussed: Yes.   Use of blood products discussed with Patient.  .          Anesthesia Attending Preoperative Note    HPI: Kitty Bangura is a 56 year old female who  has a past medical history of PONV (postoperative nausea and vomiting).  has a past surgical history that includes Abdomen surgery (); Breast surgery (); colonoscopy (); GYN surgery (); back surgery (); and Esophagoscopy, gastroscopy, duodenoscopy (EGD), combined (N/A, 2017). with a Necrotizing Pancreatitis  who is scheduled for Procedure(s):  Esophagogastroduodenoscopy, Necrosectomy - Wound Class: .      PMHx/PSHx/ROS:  Past Medical History:   Diagnosis Date     PONV (postoperative nausea and vomiting)        Past Surgical History:   Procedure Laterality Date     ABDOMEN SURGERY      Ruptured tubal pregnancies, additional surgeries followed     BACK SURGERY      L5, S1 discectomy     BREAST SURGERY      Breast reduction     COLONOSCOPY       ESOPHAGOSCOPY, GASTROSCOPY, DUODENOSCOPY (EGD), COMBINED N/A 2017    Procedure: COMBINED ENDOSCOPIC ULTRASOUND, ESOPHAGOSCOPY, GASTROSCOPY, DUODENOSCOPY (EGD), FINE NEEDLE ASPIRATE/BIOPSY;  Endoscopic Ultrasound with cystgastrostomy and fine needle aspiration ;  Surgeon: González Metz MD;  Location: [facility] OR     GYN SURGERY      Multiple ectopic pregnancies, reconnect,          Soc Hx:   Social History   Substance Use Topics     Smoking status: Former Smoker     Packs/day: 0.50     Types: Cigarettes     Start date: 1/1/1974     Quit date: 1981     Smokeless tobacco: Never Used     Alcohol use 12.6 oz/week      Comment: Now quit since Oct 31         Allergies: No Known Allergies    Meds:   No prescriptions prior to admission.       Current Outpatient Prescriptions   Medication Sig Dispense Refill     levofloxacin (LEVAQUIN) 500 MG tablet Take 1 tablet (500 mg) by mouth daily 7 tablet 0     amylase-lipase-protease (CREON) 37829-26834 UNITS CPEP per EC capsule Take 2-3 with meals / 1-2 with snacks, up to 15 per day. 450 capsule 6     HYDROcodone-acetaminophen (NORCO) 5-325 MG per tablet Take 0.5-1 tablets by mouth every 6 hours as needed for moderate to severe pain 10 tablet 0     acetaminophen (TYLENOL) 325 MG tablet Take 2 tablets (650 mg) by mouth every 4 hours as needed for mild pain 100 tablet          Labs:  BMP:  Recent Labs   Lab Test  11/27/17   1151   NA  137   POTASSIUM  4.3   CHLORIDE  103   CO2  28   BUN  16   CR  0.70   GLC  86   ILIANA  9.6     CBC:   Recent Labs   Lab Test  11/27/17   1151   WBC  7.6   RBC  3.92   HGB  12.2   HCT  39.0   MCV  100   MCH  31.1   MCHC  31.3*   RDW  12.5   PLT  514*     Coags:  Recent Labs   Lab Test  11/27/17   1151   INR  1.09          Anesthetic Assessment and Plan:    56 year old female who  has a past medical history of PONV (postoperative nausea and vomiting). to OR today for Procedure(s):  Esophagogastroduodenoscopy, Necrosectomy - Wound Class:     NPO status adequate.    ASA 2    Plan for GA, standard monitors, PONV prophylaxis. Antibiotics per surgery.    Chart reviewed    Monika Snowden MD  Anesthesiology Attending  12/03/17                   .

## 2017-12-04 ENCOUNTER — ANESTHESIA (OUTPATIENT)
Dept: SURGERY | Facility: CLINIC | Age: 57
End: 2017-12-04

## 2017-12-04 ENCOUNTER — HOSPITAL ENCOUNTER (OUTPATIENT)
Facility: CLINIC | Age: 57
Discharge: HOME | End: 2017-12-04
Attending: INTERNAL MEDICINE | Admitting: INTERNAL MEDICINE

## 2017-12-04 ENCOUNTER — SURGERY (OUTPATIENT)
Age: 57
End: 2017-12-04

## 2017-12-04 ENCOUNTER — APPOINTMENT (OUTPATIENT)
Dept: GENERAL RADIOLOGY | Facility: CLINIC | Age: 57
End: 2017-12-04
Attending: INTERNAL MEDICINE

## 2017-12-04 ENCOUNTER — CARE COORDINATION (OUTPATIENT)
Dept: GASTROENTEROLOGY | Facility: CLINIC | Age: 57
End: 2017-12-04

## 2017-12-04 ENCOUNTER — TELEPHONE (OUTPATIENT)
Dept: GASTROENTEROLOGY | Facility: CLINIC | Age: 57
End: 2017-12-04

## 2017-12-04 VITALS
HEART RATE: 86 BPM | RESPIRATION RATE: 16 BRPM | WEIGHT: 172.62 LBS | SYSTOLIC BLOOD PRESSURE: 106 MMHG | BODY MASS INDEX: 28.76 KG/M2 | TEMPERATURE: 98 F | DIASTOLIC BLOOD PRESSURE: 71 MMHG | OXYGEN SATURATION: 98 % | HEIGHT: 65 IN

## 2017-12-04 DIAGNOSIS — K85.10 ACUTE BILIARY PANCREATITIS: Primary | ICD-10-CM

## 2017-12-04 LAB
GLUCOSE BLDC GLUCOMTR-MCNC: 132 MG/DL (ref 70–99)
UPPER GI ENDOSCOPY: NORMAL

## 2017-12-04 PROCEDURE — 71000027 ZZH RECOVERY PHASE 2 EACH 15 MINS: Performed by: INTERNAL MEDICINE

## 2017-12-04 PROCEDURE — 40000279 XR SURGERY CARM FLUORO GREATER THAN 5 MIN W STILLS: Mod: TC

## 2017-12-04 PROCEDURE — 85027 COMPLETE CBC AUTOMATED: CPT | Performed by: INTERNAL MEDICINE

## 2017-12-04 PROCEDURE — 25000128 H RX IP 250 OP 636: Performed by: NURSE ANESTHETIST, CERTIFIED REGISTERED

## 2017-12-04 PROCEDURE — 25000125 ZZHC RX 250: Performed by: NURSE ANESTHETIST, CERTIFIED REGISTERED

## 2017-12-04 PROCEDURE — 27210794 ZZH OR GENERAL SUPPLY STERILE: Performed by: INTERNAL MEDICINE

## 2017-12-04 PROCEDURE — 25000566 ZZH SEVOFLURANE, EA 15 MIN: Performed by: INTERNAL MEDICINE

## 2017-12-04 PROCEDURE — 36000059 ZZH SURGERY LEVEL 3 EA 15 ADDTL MIN UMMC: Performed by: INTERNAL MEDICINE

## 2017-12-04 PROCEDURE — 25000128 H RX IP 250 OP 636: Performed by: ANESTHESIOLOGY

## 2017-12-04 PROCEDURE — 25000132 ZZH RX MED GY IP 250 OP 250 PS 637: Performed by: ANESTHESIOLOGY

## 2017-12-04 PROCEDURE — C1876 STENT, NON-COA/NON-COV W/DEL: HCPCS | Performed by: INTERNAL MEDICINE

## 2017-12-04 PROCEDURE — 36000061 ZZH SURGERY LEVEL 3 W FLUORO 1ST 30 MIN - UMMC: Performed by: INTERNAL MEDICINE

## 2017-12-04 PROCEDURE — 25500064 ZZH RX 255 OP 636: Performed by: INTERNAL MEDICINE

## 2017-12-04 PROCEDURE — C9399 UNCLASSIFIED DRUGS OR BIOLOG: HCPCS | Performed by: NURSE ANESTHETIST, CERTIFIED REGISTERED

## 2017-12-04 PROCEDURE — 71000014 ZZH RECOVERY PHASE 1 LEVEL 2 FIRST HR: Performed by: INTERNAL MEDICINE

## 2017-12-04 PROCEDURE — 40000170 ZZH STATISTIC PRE-PROCEDURE ASSESSMENT II: Performed by: INTERNAL MEDICINE

## 2017-12-04 PROCEDURE — C1769 GUIDE WIRE: HCPCS | Performed by: INTERNAL MEDICINE

## 2017-12-04 PROCEDURE — 37000009 ZZH ANESTHESIA TECHNICAL FEE, EACH ADDTL 15 MIN: Performed by: INTERNAL MEDICINE

## 2017-12-04 PROCEDURE — 82962 GLUCOSE BLOOD TEST: CPT

## 2017-12-04 PROCEDURE — 25000125 ZZHC RX 250: Performed by: INTERNAL MEDICINE

## 2017-12-04 PROCEDURE — 37000008 ZZH ANESTHESIA TECHNICAL FEE, 1ST 30 MIN: Performed by: INTERNAL MEDICINE

## 2017-12-04 DEVICE — STENT SOLUS BILIARY 10FRX01CM DBL PIGTAIL W/INTRO G26829
Type: IMPLANTABLE DEVICE | Site: STOMACH | Status: NON-FUNCTIONAL
Removed: 2017-12-12

## 2017-12-04 DEVICE — STENT SOLUS BILIARY 10FRX05CM DBL PIGTAIL W/INTRO G25672
Type: IMPLANTABLE DEVICE | Site: STOMACH | Status: NON-FUNCTIONAL
Removed: 2017-12-12

## 2017-12-04 RX ORDER — FLUMAZENIL 0.1 MG/ML
0.2 INJECTION, SOLUTION INTRAVENOUS
Status: DISCONTINUED | OUTPATIENT
Start: 2017-12-04 | End: 2017-12-04 | Stop reason: HOSPADM

## 2017-12-04 RX ORDER — FENTANYL CITRATE 50 UG/ML
25-50 INJECTION, SOLUTION INTRAMUSCULAR; INTRAVENOUS
Status: DISCONTINUED | OUTPATIENT
Start: 2017-12-04 | End: 2017-12-04 | Stop reason: HOSPADM

## 2017-12-04 RX ORDER — FENTANYL CITRATE 50 UG/ML
INJECTION, SOLUTION INTRAMUSCULAR; INTRAVENOUS PRN
Status: DISCONTINUED | OUTPATIENT
Start: 2017-12-04 | End: 2017-12-04

## 2017-12-04 RX ORDER — ONDANSETRON 2 MG/ML
INJECTION INTRAMUSCULAR; INTRAVENOUS PRN
Status: DISCONTINUED | OUTPATIENT
Start: 2017-12-04 | End: 2017-12-04

## 2017-12-04 RX ORDER — LEVOFLOXACIN 5 MG/ML
INJECTION, SOLUTION INTRAVENOUS PRN
Status: DISCONTINUED | OUTPATIENT
Start: 2017-12-04 | End: 2017-12-04

## 2017-12-04 RX ORDER — NALOXONE HYDROCHLORIDE 0.4 MG/ML
.1-.4 INJECTION, SOLUTION INTRAMUSCULAR; INTRAVENOUS; SUBCUTANEOUS
Status: DISCONTINUED | OUTPATIENT
Start: 2017-12-04 | End: 2017-12-04 | Stop reason: HOSPADM

## 2017-12-04 RX ORDER — HYDROMORPHONE HYDROCHLORIDE 1 MG/ML
.3-.5 INJECTION, SOLUTION INTRAMUSCULAR; INTRAVENOUS; SUBCUTANEOUS EVERY 10 MIN PRN
Status: DISCONTINUED | OUTPATIENT
Start: 2017-12-04 | End: 2017-12-04 | Stop reason: HOSPADM

## 2017-12-04 RX ORDER — LIDOCAINE 40 MG/G
CREAM TOPICAL
Status: DISCONTINUED | OUTPATIENT
Start: 2017-12-04 | End: 2017-12-04 | Stop reason: HOSPADM

## 2017-12-04 RX ORDER — SCOLOPAMINE TRANSDERMAL SYSTEM 1 MG/1
PATCH, EXTENDED RELEASE TRANSDERMAL PRN
Status: DISCONTINUED | OUTPATIENT
Start: 2017-12-04 | End: 2017-12-04

## 2017-12-04 RX ORDER — SODIUM CHLORIDE, SODIUM LACTATE, POTASSIUM CHLORIDE, CALCIUM CHLORIDE 600; 310; 30; 20 MG/100ML; MG/100ML; MG/100ML; MG/100ML
INJECTION, SOLUTION INTRAVENOUS CONTINUOUS
Status: DISCONTINUED | OUTPATIENT
Start: 2017-12-04 | End: 2017-12-04 | Stop reason: HOSPADM

## 2017-12-04 RX ORDER — DEXAMETHASONE SODIUM PHOSPHATE 10 MG/ML
INJECTION, SOLUTION INTRAMUSCULAR; INTRAVENOUS PRN
Status: DISCONTINUED | OUTPATIENT
Start: 2017-12-04 | End: 2017-12-04

## 2017-12-04 RX ORDER — PROPOFOL 10 MG/ML
INJECTION, EMULSION INTRAVENOUS PRN
Status: DISCONTINUED | OUTPATIENT
Start: 2017-12-04 | End: 2017-12-04

## 2017-12-04 RX ORDER — LIDOCAINE HYDROCHLORIDE 20 MG/ML
INJECTION, SOLUTION INFILTRATION; PERINEURAL PRN
Status: DISCONTINUED | OUTPATIENT
Start: 2017-12-04 | End: 2017-12-04

## 2017-12-04 RX ORDER — ONDANSETRON 4 MG/1
4 TABLET, ORALLY DISINTEGRATING ORAL EVERY 30 MIN PRN
Status: DISCONTINUED | OUTPATIENT
Start: 2017-12-04 | End: 2017-12-04 | Stop reason: HOSPADM

## 2017-12-04 RX ORDER — MEPERIDINE HYDROCHLORIDE 25 MG/ML
12.5 INJECTION INTRAMUSCULAR; INTRAVENOUS; SUBCUTANEOUS
Status: DISCONTINUED | OUTPATIENT
Start: 2017-12-04 | End: 2017-12-04 | Stop reason: HOSPADM

## 2017-12-04 RX ORDER — IOPAMIDOL 510 MG/ML
INJECTION, SOLUTION INTRAVASCULAR PRN
Status: DISCONTINUED | OUTPATIENT
Start: 2017-12-04 | End: 2017-12-04 | Stop reason: HOSPADM

## 2017-12-04 RX ORDER — ONDANSETRON 2 MG/ML
4 INJECTION INTRAMUSCULAR; INTRAVENOUS EVERY 30 MIN PRN
Status: DISCONTINUED | OUTPATIENT
Start: 2017-12-04 | End: 2017-12-04 | Stop reason: HOSPADM

## 2017-12-04 RX ADMIN — SODIUM CHLORIDE, POTASSIUM CHLORIDE, SODIUM LACTATE AND CALCIUM CHLORIDE: 600; 310; 30; 20 INJECTION, SOLUTION INTRAVENOUS at 09:44

## 2017-12-04 RX ADMIN — ROCURONIUM BROMIDE 50 MG: 10 INJECTION INTRAVENOUS at 07:33

## 2017-12-04 RX ADMIN — MIDAZOLAM HYDROCHLORIDE 2 MG: 1 INJECTION, SOLUTION INTRAMUSCULAR; INTRAVENOUS at 07:20

## 2017-12-04 RX ADMIN — SCOLOPAMINE TRANSDERMAL SYSTEM 1 PATCH: 1 PATCH, EXTENDED RELEASE TRANSDERMAL at 07:20

## 2017-12-04 RX ADMIN — PHENYLEPHRINE HYDROCHLORIDE 100 MCG: 10 INJECTION, SOLUTION INTRAMUSCULAR; INTRAVENOUS; SUBCUTANEOUS at 07:44

## 2017-12-04 RX ADMIN — SODIUM CHLORIDE, POTASSIUM CHLORIDE, SODIUM LACTATE AND CALCIUM CHLORIDE: 600; 310; 30; 20 INJECTION, SOLUTION INTRAVENOUS at 07:20

## 2017-12-04 RX ADMIN — BENZOCAINE AND MENTHOL 1 LOZENGE: 15; 3.6 LOZENGE ORAL at 10:21

## 2017-12-04 RX ADMIN — LEVOFLOXACIN 500 MG: 5 INJECTION, SOLUTION INTRAVENOUS at 07:40

## 2017-12-04 RX ADMIN — SUGAMMADEX 150 MG: 100 INJECTION, SOLUTION INTRAVENOUS at 09:57

## 2017-12-04 RX ADMIN — SIMETHICONE 2 ML: 63.3; 3.7 SOLUTION/ DROPS ORAL at 07:50

## 2017-12-04 RX ADMIN — PROPOFOL 50 MG: 10 INJECTION, EMULSION INTRAVENOUS at 09:13

## 2017-12-04 RX ADMIN — SODIUM CHLORIDE, POTASSIUM CHLORIDE, SODIUM LACTATE AND CALCIUM CHLORIDE: 600; 310; 30; 20 INJECTION, SOLUTION INTRAVENOUS at 10:21

## 2017-12-04 RX ADMIN — PHENYLEPHRINE HYDROCHLORIDE 300 MCG: 10 INJECTION, SOLUTION INTRAMUSCULAR; INTRAVENOUS; SUBCUTANEOUS at 07:46

## 2017-12-04 RX ADMIN — FENTANYL CITRATE 50 MCG: 50 INJECTION, SOLUTION INTRAMUSCULAR; INTRAVENOUS at 09:43

## 2017-12-04 RX ADMIN — ONDANSETRON 4 MG: 2 INJECTION INTRAMUSCULAR; INTRAVENOUS at 09:46

## 2017-12-04 RX ADMIN — LIDOCAINE HYDROCHLORIDE 100 MG: 20 INJECTION, SOLUTION INFILTRATION; PERINEURAL at 07:33

## 2017-12-04 RX ADMIN — IOPAMIDOL 60 ML: 510 INJECTION, SOLUTION INTRAVASCULAR at 09:49

## 2017-12-04 RX ADMIN — FENTANYL CITRATE 100 MCG: 50 INJECTION, SOLUTION INTRAMUSCULAR; INTRAVENOUS at 07:33

## 2017-12-04 RX ADMIN — DEXAMETHASONE SODIUM PHOSPHATE 6 MG: 10 INJECTION, SOLUTION INTRAMUSCULAR; INTRAVENOUS at 07:55

## 2017-12-04 RX ADMIN — PROPOFOL 150 MG: 10 INJECTION, EMULSION INTRAVENOUS at 07:33

## 2017-12-04 NOTE — IP AVS SNAPSHOT
MRN:[id number]                      After Visit Summary   12/4/2017    Kitty Bangura    MRN: [id number]           Thank you!     Thank you for choosing [facility] for your care. Our goal is always to provide you with excellent care. Hearing back from our patients is one way we can continue to improve our services. Please take a few minutes to complete the written survey that you may receive in the mail after you visit with us. Thank you!        Patient Information     Date Of Birth          1960        About your hospital stay     You were admitted on:  December 4, 2017 You last received care in the:  Same Day Surgery [facility]    You were discharged on:  December 4, 2017       Who to Call     For medical emergencies, please call 911.  For non-urgent questions about your medical care, please call your primary care provider or clinic, [phone]  For questions related to your surgery, please call your surgery clinic        Attending Provider     Provider González Valenzuela MD Gastroenterology       Primary Care Provider Office Phone # Fax #    Josenargis Julito Mcgill [phone]      After Care Instructions     Discharge Instructions       No driving or operating machinery until the day after procedure.            Discharge Instructions       Recommend that a responsible adult remain with the patient at home for 24 hours post discharge.            Discharge Instructions       Start with clear liquids, sips of water 1 hour after procedure. If no abdominal pain and gag reflex has returned, advance as tolerated to pre-procedure diet.              Discharge Instructions       Restart home medications.            Discharge Instructions       Check with your Provider when to start anticoagulant medication.            Discharge Instructions       No ALCOHOL 24 hours post procedure.                  Further instructions from your care team       [facility]  [facility]  Same-Day Surgery   Adult Discharge Orders & Instructions     For 24 hours after surgery    1. Get plenty of rest.  A responsible adult must stay with you for at least 24 hours after you leave the hospital.   2. Do not drive or use heavy equipment.  If you have weakness or tingling, don't drive or use heavy equipment until this feeling goes away.  3. Do not drink alcohol.  4. Avoid strenuous or risky activities.  Ask for help when climbing stairs.   5. You may feel lightheaded.  IF so, sit for a few minutes before standing.  Have someone help you get up.   6. If you have nausea (feel sick to your stomach): Drink only clear liquids such as apple juice, ginger ale, broth or 7-Up.  Rest may also help.  Be sure to drink enough fluids.  Move to a regular diet as you feel able.  7. You may have a slight fever. Call the doctor if your fever is over 100 F (37.7 C) (taken under the tongue) or lasts longer than 24 hours.  8. You may have a dry mouth, a sore throat, muscle aches or trouble sleeping.  These should go away after 24 hours.  9. Do not make important or legal decisions.   Call your doctor for any of the followin.  Signs of infection (fever, growing tenderness at the surgery site, a large amount of drainage or bleeding, severe pain, foul-smelling drainage, redness, swelling).    2. It has been over 8 to 10 hours since surgery and you are still not able to urinate (pass water).    3.  Headache for over 24 hours.    To contact a doctor, call Dr Metz's office at [phone]   or:        [phone] and ask for the resident on call for   Gastroenterology (answered 24 hours a day)      Emergency Department:    [facility]: [phone]       (TTY for hearing impaired: [phone])                Additional Information     If you use hormonal birth control (such as the pill, patch, ring or implants): You'll need a second form of birth control for 7 days (condoms, a  "diaphragm or contraceptive foam). While in the hospital, you received a medicine called Bridion. Your normal birth control will not work as well for a week after taking this medicine.          Pending Results     No orders found from 12/2/2017 to 12/5/2017.            Admission Information     Date & Time Provider Department Dept. Phone    12/4/2017 González Metz MD Same Day Surgery [facility] [place] [phone]      Your Vitals Were     Blood Pressure Pulse Temperature Respirations Height Weight    108/68 86 98.4  F (36.9  C) (Oral) 16 1.651 m (5' 5\") 78.3 kg (172 lb 9.9 oz)    Pulse Oximetry BMI (Body Mass Index)                98% 28.73 kg/m2          [facility] Information     [employer] gives you secure access to your electronic health record. If you see a primary care provider, you can also send messages to your care team and make appointments. If you have questions, please call your primary care clinic.  If you do not have a primary care provider, please call [phone] and they will assist you.        Care EveryWhere ID     This is your Care EveryWhere ID. This could be used by other organizations to access your [employer] medical records  [id number]        Equal Access to Services     CHASE DE GUZMAN AH: Tre Ruiz, solange rasmussen, larisa ferguson, re yip. So [facility] [phone].    ATENCIÓN: Si habla español, tiene a quiros disposición servicios gratuitos de asistencia lingüística. Llame al [phone].    We comply with applicable federal civil rights laws and [place] laws. We do not discriminate on the basis of race, color, national origin, age, disability, sex, sexual orientation, or gender identity.               Review of your medicines      CONTINUE these medicines which have NOT CHANGED        Dose / Directions    acetaminophen 325 MG tablet   Commonly known as:  TYLENOL   Used for:  Acute pancreatitis without infection or necrosis, " unspecified pancreatitis type        Dose:  650 mg   Take 2 tablets (650 mg) by mouth every 4 hours as needed for mild pain   Quantity:  100 tablet   Refills:  0       amylase-lipase-protease [id number] UNITS Cpep per EC capsule   Commonly known as:  CREON   Used for:  Necrotizing pancreatitis        Take 2-3 with meals / 1-2 with snacks, up to 15 per day.   Quantity:  450 capsule   Refills:  6       HYDROcodone-acetaminophen 5-325 MG per tablet   Commonly known as:  NORCO   Used for:  Acute pancreatitis without infection or necrosis, unspecified pancreatitis type, Acute pancreatitis with uninfected necrosis, unspecified pancreatitis type        Dose:  0.5-1 tablet   Take 0.5-1 tablets by mouth every 6 hours as needed for moderate to severe pain   Quantity:  10 tablet   Refills:  0       levofloxacin 500 MG tablet   Commonly known as:  LEVAQUIN   Used for:  Pancreas cyst, Necrotizing pancreatitis        Dose:  500 mg   Take 1 tablet (500 mg) by mouth daily   Quantity:  7 tablet   Refills:  0                Protect others around you: Learn how to safely use, store and throw away your medicines at www.disposemymeds.org.             Medication List: This is a list of all your medications and when to take them. Check marks below indicate your daily home schedule. Keep this list as a reference.      Medications           Morning Afternoon Evening Bedtime As Needed    acetaminophen 325 MG tablet   Commonly known as:  TYLENOL   Take 2 tablets (650 mg) by mouth every 4 hours as needed for mild pain                                amylase-lipase-protease [id number] UNITS Cpep per EC capsule   Commonly known as:  CREON   Take 2-3 with meals / 1-2 with snacks, up to 15 per day.                                HYDROcodone-acetaminophen 5-325 MG per tablet   Commonly known as:  NORCO   Take 0.5-1 tablets by mouth every 6 hours as needed for moderate to severe pain                                levofloxacin 500 MG tablet    Commonly known as:  LEVAQUIN   Take 1 tablet (500 mg) by mouth daily                                          More Information        Patient Education    Scopolamine Hydrobromide Ophthalmic drops, solution    Scopolamine Hydrobromide Oral tablet    Scopolamine Transdermal patch - 72 hour  Scopolamine Transdermal patch - 72 hour  What is this medicine?  SCOPOLAMINE (skoe CLARK a meen) is used to prevent nausea and vomiting caused by motion sickness, anesthesia and surgery.  This medicine may be used for other purposes; ask your health care provider or pharmacist if you have questions.  What should I tell my health care provider before I take this medicine?  They need to know if you have any of these conditions:    glaucoma    kidney or liver disease    an unusual or allergic reaction (especially skin allergy) to scopolamine, atropine, other medicines, foods, dyes, or preservatives    pregnant or trying to get pregnant    breast-feeding  How should I use this medicine?  This medicine is for external use only. Follow the directions on the prescription label. One patch contains enough medicine to prevent motion sickness for up to 3 days. Apply the patch at least 4 hours before you need it and only wear one disc at a time. Choose an area behind the ear, that is clean, dry, hairless and free from any cuts or irritation. Wipe the area with a clean dry tissue. Peel off the plastic backing of the skin patch, trying not to touch the adhesive side with your hands. Do not cut the patches. Firmly apply to the area you have chosen, with the metallic side of the patch to the skin and the tan-colored side showing. Once firmly in place, wash your hands well with soap and water. Remove the disc after 3 days, or sooner if you no longer need it. After removing the patch, wash your hands and the area behind your ear thoroughly with soap and water. The patch will still contain some medicine after use. To avoid accidental contact or  ingestion by children or pets, fold the used patch in half with the sticky side together and throw away in the trash out of the reach of children and pets. If you need to use a second patch after you remove the first, place it behind the other ear.  Talk to your pediatrician regarding the use of this medicine in children. Special care may be needed.  Overdosage: If you think you have taken too much of this medicine contact a poison control center or emergency room at once.  NOTE: This medicine is only for you. Do not share this medicine with others.  What if I miss a dose?  Make sure you apply the patch at least 4 hours before you need it. You can apply it the night before traveling.  What may interact with this medicine?    benztropine    bethanechol    medicines for anxiety or sleeping problems like diazepam or temazepam    medicines for hay fever and other allergies    medicines for mental depression    muscle relaxants  This list may not describe all possible interactions. Give your health care provider a list of all the medicines, herbs, non-prescription drugs, or dietary supplements you use. Also tell them if you smoke, drink alcohol, or use illegal drugs. Some items may interact with your medicine.  What should I watch for while using this medicine?  Keep the patch dry, if possible, to prevent it from falling off. Limited contact with water, however, as in bathing or swimming, will not affect the system. If the patch falls off, throw it away and put a new one behind the other ear.  You may get drowsy or dizzy. Do not drive, use machinery, or do anything that needs mental alertness until you know how this medicine affects you. Do not stand or sit up quickly, especially if you are an older patient. This reduces the risk of dizzy or fainting spells. Alcohol may interfere with the effect of this medicine. Avoid alcoholic drinks.  Your mouth may get dry. Chewing sugarless gum or sucking hard candy, and drinking  plenty of water may help. Contact your doctor if the problem does not go away or is severe.  This medicine may cause dry eyes and blurred vision. If you wear contact lenses you may feel some discomfort. Lubricating drops may help. See your eye doctor if the problem does not go away or is severe.  If you are going to have a magnetic resonance imaging (MRI) procedure, tell your MRI technician if you have this patch on your body. It must be removed before a MRI.  What side effects may I notice from receiving this medicine?  Side effects that you should report to your doctor or health care professional as soon as possible:    agitation, nervousness, confusion    blurred vision and other eye problems    dizziness, drowsiness    eye pain or redness in the whites of the eye    hallucinations    pain or difficulty passing urine    skin rash, itching    vomiting  Side effects that usually do not require medical attention (report to your doctor or health care professional if they continue or are bothersome):    headache    nausea  This list may not describe all possible side effects. Call your doctor for medical advice about side effects. You may report side effects to FDA at 7-763-FDA-0510.  Where should I keep my medicine?  Keep out of the reach of children.  Store at room temperature between 20 and 25 degrees C (68 and 77 degrees F). Throw away any unused medicine after the expiration date. When you remove a patch, fold it and throw it in the trash as described above.  NOTE: This sheet is a summary. It may not cover all possible information. If you have questions about this medicine, talk to your doctor, pharmacist, or health care provider.  NOTE:This sheet is a summary. It may not cover all possible information. If you have questions about this medicine, talk to your doctor, pharmacist, or health care provider. Copyright  2016 Gold Standard

## 2017-12-04 NOTE — ANESTHESIA POSTPROCEDURE EVALUATION
Patient: Kitty Bangura    Procedure(s):  Esophagogastroduodenoscopy, with  Necrosectomy and stents replacement   - Wound Class: II-Clean Contaminated    Diagnosis:Necrotizing Pancreatitis   Diagnosis Additional Information: No value filed.    Anesthesia Type:  No value filed.    Note:  Anesthesia Post Evaluation    Patient location during evaluation: PACU  Patient participation: Able to fully participate in evaluation  Level of consciousness: awake and alert  Pain management: adequate  Airway patency: patent  Cardiovascular status: hemodynamically stable  Respiratory status: acceptable  Hydration status: acceptable  PONV: none     Anesthetic complications: None          Last vitals:  Vitals:    12/04/17 0550 12/04/17 1004   BP: 112/77 116/69   Pulse: 86    Resp: 16 18   Temp: 37  C (98.6  F) 36.7  C (98  F)   SpO2: 97%          Electronically Signed By: Monika Snowden MD  December 4, 2017  10:16 AM

## 2017-12-04 NOTE — ANESTHESIA CARE TRANSFER NOTE
Patient: Kitty Bangura    Procedure(s):  Esophagogastroduodenoscopy, with  Necrosectomy and stents replacement   - Wound Class: II-Clean Contaminated    Diagnosis: Necrotizing Pancreatitis   Diagnosis Additional Information: No value filed.    Anesthesia Type:   No value filed.     Note:  Airway :Face Mask  Patient transferred to:PACU  Comments: To PACU on supplemental O2 with + air exchange, placed on monitor. Report given to RN, questions answered, VSS, patient alert and comfortable.Handoff Report: Identifed the Patient, Identified the Reponsible Provider, Reviewed the pertinent medical history, Discussed the surgical course, Reviewed Intra-OP anesthesia mangement and issues during anesthesia, Set expectations for post-procedure period and Allowed opportunity for questions and acknowledgement of understanding      Vitals: (Last set prior to Anesthesia Care Transfer)    CRNA VITALS  12/4/2017 0930 - 12/4/2017 1004      12/4/2017             Pulse: 92    Ht Rate: 95    SpO2: 100 %    EKG: Sinus rhythm                Electronically Signed By: TYLER Riddle CRNA  December 4, 2017  10:04 AM

## 2017-12-04 NOTE — IP AVS SNAPSHOT
Same Day Surgery [address] [address]    Phone:  [phone]                                       After Visit Summary   12/4/2017    Kitty Bangura    MRN: [id number]           After Visit Summary Signature Page     I have received my discharge instructions, and my questions have been answered. I have discussed any challenges I see with this plan with the nurse or doctor.    ..........................................................................................................................................  Patient/Patient Representative Signature      ..........................................................................................................................................  Patient Representative Print Name and Relationship to Patient    ..................................................               ................................................  Date                                            Time    ..........................................................................................................................................  Reviewed by Signature/Title    ...................................................              ..............................................  Date                                                            Time

## 2017-12-04 NOTE — DISCHARGE INSTRUCTIONS
[facility]  Same-Day Surgery   Adult Discharge Orders & Instructions     For 24 hours after surgery    1. Get plenty of rest.  A responsible adult must stay with you for at least 24 hours after you leave the hospital.   2. Do not drive or use heavy equipment.  If you have weakness or tingling, don't drive or use heavy equipment until this feeling goes away.  3. Do not drink alcohol.  4. Avoid strenuous or risky activities.  Ask for help when climbing stairs.   5. You may feel lightheaded.  IF so, sit for a few minutes before standing.  Have someone help you get up.   6. If you have nausea (feel sick to your stomach): Drink only clear liquids such as apple juice, ginger ale, broth or 7-Up.  Rest may also help.  Be sure to drink enough fluids.  Move to a regular diet as you feel able.  7. You may have a slight fever. Call the doctor if your fever is over 100 F (37.7 C) (taken under the tongue) or lasts longer than 24 hours.  8. You may have a dry mouth, a sore throat, muscle aches or trouble sleeping.  These should go away after 24 hours.  9. Do not make important or legal decisions.   Call your doctor for any of the followin.  Signs of infection (fever, growing tenderness at the surgery site, a large amount of drainage or bleeding, severe pain, foul-smelling drainage, redness, swelling).    2. It has been over 8 to 10 hours since surgery and you are still not able to urinate (pass water).    3.  Headache for over 24 hours.    To contact a doctor, call Dr Metz's office at [phone]   or:        [phone] and ask for the resident on call for   Gastroenterology (answered 24 hours a day)      Emergency Department:    [facility]: [phone]       (TTY for hearing impaired: [phone])

## 2017-12-04 NOTE — TELEPHONE ENCOUNTER
Results of pancreatic biopsy discussed with pt today. 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    Discussed at tumor conference today.    Necrosectomy today with possible complete clearance of cavity.    Please arrange for non-contrast CT chest/abd/pelvis next Monday with clinic visit at that time after the CT.  Please also arrange for clinic visit with myself, Dr. Monk and Dr. Byrd in about 6 weeks with contrast-enhanced MRI (Eovist) to follow non-specific liver lesions and assess collapse of necrosis cavity.    WINSTON Metz MD  Associate Professor of Medicine  Division of Gastroenterology, Hepatology and Nutrition  [facility]

## 2017-12-05 ENCOUNTER — CARE COORDINATION (OUTPATIENT)
Dept: GASTROENTEROLOGY | Facility: CLINIC | Age: 57
End: 2017-12-05

## 2017-12-05 DIAGNOSIS — K85.90 ACUTE PANCREATITIS: Primary | ICD-10-CM

## 2017-12-05 NOTE — PROGRESS NOTES
RN Care Coordination Post Operative Note  Surgical Oncology    Called Sofie in f/u to recent necrosectomy done yesterday by Dr. Metz    Health Status:    Fevers or Chills:  no    Nausea no  Vomiting no  Diet: fluids without difficulty    Pain: denies    Discussion:  All of her questions were answered.  I have informed her that we are moving appointments for her and that Dr. Metz will talk with her about those when she comes to see him on Monday.    Patient has our contact information and I have asked that they call with any further questions or concerns.    Plan: CT and clinic visit with Dr. Metz on 12/11    Emmanuelle GILLETTEN, HNBC, STAR-T  RN Care Coordinator  Surgical Oncology  Ph: [phone]  FAX: [phone]

## 2017-12-08 ENCOUNTER — TELEPHONE (OUTPATIENT)
Dept: GASTROENTEROLOGY | Facility: CLINIC | Age: 57
End: 2017-12-08

## 2017-12-08 DIAGNOSIS — K85.91 NECROTIZING PANCREATITIS: Primary | ICD-10-CM

## 2017-12-08 RX ORDER — CIPROFLOXACIN 500 MG/1
500 TABLET, FILM COATED ORAL 2 TIMES DAILY
Qty: 28 TABLET | Refills: 0 | Status: ON HOLD | OUTPATIENT
Start: 2017-12-08 | End: 2017-12-13

## 2017-12-08 NOTE — TELEPHONE ENCOUNTER
Patient called.     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 Had endoscopic necrosectomy on 12/4/, finished a course of levofloxacin yesterday. Today, she developed a fever to 100.4, and intermittent RUQ abdominal pain. Discussed patient with Dr. Metz, concerning for infectious necrotizing pancreatitis, advise the patient to come to ED for repeat CT; if patient does not want to come in, then recommend Cipro 500 mg BID followed by repeat CT on Monday.     Discussed with patient, she does not have a ride now, and would prefer to stay home and continue to monitor her symptoms. Cipro is e-scribed to her local pharmacy and she will  this evening. Pt said if she does not feel better, then she will come to ED tomorrow. Advised her to not hesitate to call ambulance to come in if she feel worse overnight.

## 2017-12-09 ENCOUNTER — APPOINTMENT (OUTPATIENT)
Dept: CT IMAGING | Facility: CLINIC | Age: 57
DRG: 406 | End: 2017-12-09
Attending: EMERGENCY MEDICINE

## 2017-12-09 ENCOUNTER — HOSPITAL ENCOUNTER (INPATIENT)
Facility: CLINIC | Age: 57
LOS: 4 days | Discharge: HOME | DRG: 406 | End: 2017-12-13
Attending: EMERGENCY MEDICINE | Admitting: INTERNAL MEDICINE

## 2017-12-09 DIAGNOSIS — R10.13 ABDOMINAL PAIN, EPIGASTRIC: ICD-10-CM

## 2017-12-09 DIAGNOSIS — K85.91 NECROTIZING PANCREATITIS: ICD-10-CM

## 2017-12-09 DIAGNOSIS — R10.9 ABDOMINAL PAIN: ICD-10-CM

## 2017-12-09 DIAGNOSIS — R10.11 ABDOMINAL PAIN, RIGHT UPPER QUADRANT: ICD-10-CM

## 2017-12-09 DIAGNOSIS — C25.9 MALIGNANT NEOPLASM OF PANCREAS, UNSPECIFIED LOCATION OF MALIGNANCY (H): ICD-10-CM

## 2017-12-09 LAB
ALBUMIN SERPL-MCNC: 3 G/DL (ref 3.4–5)
ALBUMIN UR-MCNC: 10 MG/DL
ALP SERPL-CCNC: 80 U/L (ref 40–150)
ALT SERPL W P-5'-P-CCNC: 42 U/L (ref 0–50)
ANION GAP SERPL CALCULATED.3IONS-SCNC: 6 MMOL/L (ref 3–14)
APPEARANCE UR: CLEAR
AST SERPL W P-5'-P-CCNC: 21 U/L (ref 0–45)
BASOPHILS # BLD AUTO: 0 10E9/L (ref 0–0.2)
BASOPHILS NFR BLD AUTO: 0.2 %
BILIRUB SERPL-MCNC: 0.5 MG/DL (ref 0.2–1.3)
BILIRUB UR QL STRIP: NEGATIVE
BUN SERPL-MCNC: 11 MG/DL (ref 7–30)
CALCIUM SERPL-MCNC: 9 MG/DL (ref 8.5–10.1)
CHLORIDE SERPL-SCNC: 105 MMOL/L (ref 94–109)
CO2 SERPL-SCNC: 26 MMOL/L (ref 20–32)
COLOR UR AUTO: YELLOW
CREAT SERPL-MCNC: 0.77 MG/DL (ref 0.52–1.04)
DIFFERENTIAL METHOD BLD: ABNORMAL
EOSINOPHIL # BLD AUTO: 0.1 10E9/L (ref 0–0.7)
EOSINOPHIL NFR BLD AUTO: 0.4 %
ERYTHROCYTE [DISTWIDTH] IN BLOOD BY AUTOMATED COUNT: 12.9 % (ref 10–15)
GFR SERPL CREATININE-BSD FRML MDRD: 76 ML/MIN/1.7M2
GLUCOSE SERPL-MCNC: 137 MG/DL (ref 70–99)
GLUCOSE UR STRIP-MCNC: NEGATIVE MG/DL
HCT VFR BLD AUTO: 37.4 % (ref 35–47)
HGB BLD-MCNC: 12 G/DL (ref 11.7–15.7)
HGB UR QL STRIP: ABNORMAL
IMM GRANULOCYTES # BLD: 0 10E9/L (ref 0–0.4)
IMM GRANULOCYTES NFR BLD: 0.2 %
INR PPP: 1.08 (ref 0.86–1.14)
KETONES UR STRIP-MCNC: NEGATIVE MG/DL
LACTATE BLD-SCNC: 0.7 MMOL/L (ref 0.7–2)
LACTATE BLD-SCNC: 0.8 MMOL/L (ref 0.7–2)
LEUKOCYTE ESTERASE UR QL STRIP: NEGATIVE
LIPASE SERPL-CCNC: 189 U/L (ref 73–393)
LYMPHOCYTES # BLD AUTO: 1.9 10E9/L (ref 0.8–5.3)
LYMPHOCYTES NFR BLD AUTO: 13 %
MCH RBC QN AUTO: 30.8 PG (ref 26.5–33)
MCHC RBC AUTO-ENTMCNC: 32.1 G/DL (ref 31.5–36.5)
MCV RBC AUTO: 96 FL (ref 78–100)
MONOCYTES # BLD AUTO: 2.4 10E9/L (ref 0–1.3)
MONOCYTES NFR BLD AUTO: 16.6 %
MUCOUS THREADS #/AREA URNS LPF: PRESENT /LPF
NEUTROPHILS # BLD AUTO: 10.1 10E9/L (ref 1.6–8.3)
NEUTROPHILS NFR BLD AUTO: 69.6 %
NITRATE UR QL: NEGATIVE
NRBC # BLD AUTO: 0 10*3/UL
NRBC BLD AUTO-RTO: 0 /100
PH UR STRIP: 5.5 PH (ref 5–7)
PLATELET # BLD AUTO: 336 10E9/L (ref 150–450)
PLATELET # BLD EST: ABNORMAL 10*3/UL
POTASSIUM SERPL-SCNC: 4.2 MMOL/L (ref 3.4–5.3)
PROT SERPL-MCNC: 7.8 G/DL (ref 6.8–8.8)
RBC # BLD AUTO: 3.9 10E12/L (ref 3.8–5.2)
RBC #/AREA URNS AUTO: 3 /HPF (ref 0–2)
SODIUM SERPL-SCNC: 138 MMOL/L (ref 133–144)
SOURCE: ABNORMAL
SP GR UR STRIP: 1.02 (ref 1–1.03)
SQUAMOUS #/AREA URNS AUTO: 1 /HPF (ref 0–1)
UROBILINOGEN UR STRIP-MCNC: NORMAL MG/DL (ref 0–2)
WBC # BLD AUTO: 14.5 10E9/L (ref 4–11)
WBC #/AREA URNS AUTO: 1 /HPF (ref 0–2)

## 2017-12-09 PROCEDURE — 83605 ASSAY OF LACTIC ACID: CPT | Performed by: INTERNAL MEDICINE

## 2017-12-09 PROCEDURE — 81001 URINALYSIS AUTO W/SCOPE: CPT | Performed by: EMERGENCY MEDICINE

## 2017-12-09 PROCEDURE — 36415 COLL VENOUS BLD VENIPUNCTURE: CPT | Performed by: INTERNAL MEDICINE

## 2017-12-09 PROCEDURE — 96361 HYDRATE IV INFUSION ADD-ON: CPT | Performed by: EMERGENCY MEDICINE

## 2017-12-09 PROCEDURE — 99223 1ST HOSP IP/OBS HIGH 75: CPT | Mod: AI | Performed by: INTERNAL MEDICINE

## 2017-12-09 PROCEDURE — 25000128 H RX IP 250 OP 636: Performed by: EMERGENCY MEDICINE

## 2017-12-09 PROCEDURE — 12000001 ZZH R&B MED SURG/OB UMMC

## 2017-12-09 PROCEDURE — 96376 TX/PRO/DX INJ SAME DRUG ADON: CPT | Performed by: EMERGENCY MEDICINE

## 2017-12-09 PROCEDURE — 71260 CT THORAX DX C+: CPT

## 2017-12-09 PROCEDURE — 83690 ASSAY OF LIPASE: CPT | Performed by: EMERGENCY MEDICINE

## 2017-12-09 PROCEDURE — 25000132 ZZH RX MED GY IP 250 OP 250 PS 637: Performed by: STUDENT IN AN ORGANIZED HEALTH CARE EDUCATION/TRAINING PROGRAM

## 2017-12-09 PROCEDURE — 96374 THER/PROPH/DIAG INJ IV PUSH: CPT | Mod: 59 | Performed by: EMERGENCY MEDICINE

## 2017-12-09 PROCEDURE — 83605 ASSAY OF LACTIC ACID: CPT | Performed by: EMERGENCY MEDICINE

## 2017-12-09 PROCEDURE — 85610 PROTHROMBIN TIME: CPT | Performed by: EMERGENCY MEDICINE

## 2017-12-09 PROCEDURE — 85025 COMPLETE CBC W/AUTO DIFF WBC: CPT | Performed by: EMERGENCY MEDICINE

## 2017-12-09 PROCEDURE — 25000128 H RX IP 250 OP 636: Performed by: STUDENT IN AN ORGANIZED HEALTH CARE EDUCATION/TRAINING PROGRAM

## 2017-12-09 PROCEDURE — 99285 EMERGENCY DEPT VISIT HI MDM: CPT | Mod: 25 | Performed by: EMERGENCY MEDICINE

## 2017-12-09 PROCEDURE — 80053 COMPREHEN METABOLIC PANEL: CPT | Performed by: EMERGENCY MEDICINE

## 2017-12-09 PROCEDURE — 99284 EMERGENCY DEPT VISIT MOD MDM: CPT | Mod: Z6 | Performed by: EMERGENCY MEDICINE

## 2017-12-09 RX ORDER — ONDANSETRON 2 MG/ML
4 INJECTION INTRAMUSCULAR; INTRAVENOUS EVERY 30 MIN PRN
Status: COMPLETED | OUTPATIENT
Start: 2017-12-09 | End: 2017-12-09

## 2017-12-09 RX ORDER — SODIUM CHLORIDE, SODIUM LACTATE, POTASSIUM CHLORIDE, CALCIUM CHLORIDE 600; 310; 30; 20 MG/100ML; MG/100ML; MG/100ML; MG/100ML
INJECTION, SOLUTION INTRAVENOUS CONTINUOUS
Status: DISCONTINUED | OUTPATIENT
Start: 2017-12-09 | End: 2017-12-10

## 2017-12-09 RX ORDER — IOPAMIDOL 755 MG/ML
105 INJECTION, SOLUTION INTRAVASCULAR ONCE
Status: COMPLETED | OUTPATIENT
Start: 2017-12-09 | End: 2017-12-09

## 2017-12-09 RX ORDER — ACETAMINOPHEN 325 MG/1
650 TABLET ORAL EVERY 4 HOURS PRN
Status: DISCONTINUED | OUTPATIENT
Start: 2017-12-09 | End: 2017-12-13 | Stop reason: HOSPADM

## 2017-12-09 RX ORDER — MEROPENEM 1 G/1
1 INJECTION, POWDER, FOR SOLUTION INTRAVENOUS EVERY 8 HOURS
Status: DISCONTINUED | OUTPATIENT
Start: 2017-12-09 | End: 2017-12-09

## 2017-12-09 RX ORDER — NALOXONE HYDROCHLORIDE 0.4 MG/ML
.1-.4 INJECTION, SOLUTION INTRAMUSCULAR; INTRAVENOUS; SUBCUTANEOUS
Status: DISCONTINUED | OUTPATIENT
Start: 2017-12-09 | End: 2017-12-13 | Stop reason: HOSPADM

## 2017-12-09 RX ADMIN — SODIUM CHLORIDE 1000 ML: 9 INJECTION, SOLUTION INTRAVENOUS at 06:20

## 2017-12-09 RX ADMIN — ACETAMINOPHEN 650 MG: 325 TABLET, FILM COATED ORAL at 23:47

## 2017-12-09 RX ADMIN — ACETAMINOPHEN 650 MG: 325 TABLET, FILM COATED ORAL at 19:05

## 2017-12-09 RX ADMIN — SODIUM CHLORIDE, POTASSIUM CHLORIDE, SODIUM LACTATE AND CALCIUM CHLORIDE: 600; 310; 30; 20 INJECTION, SOLUTION INTRAVENOUS at 15:08

## 2017-12-09 RX ADMIN — ACETAMINOPHEN 650 MG: 325 TABLET, FILM COATED ORAL at 11:59

## 2017-12-09 RX ADMIN — ONDANSETRON 4 MG: 2 INJECTION INTRAMUSCULAR; INTRAVENOUS at 04:05

## 2017-12-09 RX ADMIN — SODIUM CHLORIDE, POTASSIUM CHLORIDE, SODIUM LACTATE AND CALCIUM CHLORIDE: 600; 310; 30; 20 INJECTION, SOLUTION INTRAVENOUS at 22:41

## 2017-12-09 RX ADMIN — PIPERACILLIN SODIUM AND TAZOBACTAM SODIUM 3.38 G: 36; 4.5 INJECTION, POWDER, LYOPHILIZED, FOR SOLUTION INTRAVENOUS at 12:41

## 2017-12-09 RX ADMIN — SODIUM CHLORIDE 1000 ML: 9 INJECTION, SOLUTION INTRAVENOUS at 03:58

## 2017-12-09 RX ADMIN — ONDANSETRON 4 MG: 2 INJECTION INTRAMUSCULAR; INTRAVENOUS at 09:19

## 2017-12-09 RX ADMIN — SODIUM CHLORIDE, POTASSIUM CHLORIDE, SODIUM LACTATE AND CALCIUM CHLORIDE: 600; 310; 30; 20 INJECTION, SOLUTION INTRAVENOUS at 19:30

## 2017-12-09 RX ADMIN — ONDANSETRON 4 MG: 2 INJECTION INTRAMUSCULAR; INTRAVENOUS at 06:20

## 2017-12-09 RX ADMIN — PIPERACILLIN SODIUM AND TAZOBACTAM SODIUM 3.38 G: 36; 4.5 INJECTION, POWDER, LYOPHILIZED, FOR SOLUTION INTRAVENOUS at 23:47

## 2017-12-09 RX ADMIN — SODIUM CHLORIDE, POTASSIUM CHLORIDE, SODIUM LACTATE AND CALCIUM CHLORIDE: 600; 310; 30; 20 INJECTION, SOLUTION INTRAVENOUS at 11:06

## 2017-12-09 RX ADMIN — PANCRELIPASE 48000 UNITS: 24000; 76000; 120000 CAPSULE, DELAYED RELEASE PELLETS ORAL at 12:40

## 2017-12-09 RX ADMIN — IOPAMIDOL 105 ML: 755 INJECTION, SOLUTION INTRAVENOUS at 04:53

## 2017-12-09 RX ADMIN — SODIUM CHLORIDE, POTASSIUM CHLORIDE, SODIUM LACTATE AND CALCIUM CHLORIDE 1000 ML: 600; 310; 30; 20 INJECTION, SOLUTION INTRAVENOUS at 09:23

## 2017-12-09 RX ADMIN — PANCRELIPASE 72000 UNITS: 24000; 76000; 120000 CAPSULE, DELAYED RELEASE PELLETS ORAL at 17:52

## 2017-12-09 RX ADMIN — PIPERACILLIN SODIUM AND TAZOBACTAM SODIUM 3.38 G: 36; 4.5 INJECTION, POWDER, LYOPHILIZED, FOR SOLUTION INTRAVENOUS at 17:52

## 2017-12-09 NOTE — ED NOTES
"ED to Floor Handoff      S:  Kitty Bangura is a 57 year old female who speaks English and lives with a spouse,  in a home  They arrived in the ED by car from home with a complaint of Abdominal Pain and Fever    Initial vitals were:   BP: 97/64  Pulse: 108  Temp: 98.7  F (37.1  C)  Resp: 18  Height: 165.1 cm (5' 5\")  Weight: 78.4 kg (172 lb 13.5 oz)  SpO2: 95 %  Allergies: No Known Allergies.  The meds given in the ED and their home medications are:   Current Facility-Administered Medications   Medication     0.9% sodium chloride BOLUS     ondansetron (ZOFRAN) injection 4 mg     Current Outpatient Prescriptions   Medication     ciprofloxacin (CIPRO) 500 MG tablet     amylase-lipase-protease (CREON) 07308-59738 UNITS CPEP per EC capsule     acetaminophen (TYLENOL) 325 MG tablet     Social demographics are   Social History     Social History     Marital status:      Spouse name: N/A     Number of children: N/A     Years of education: N/A     Social History Main Topics     Smoking status: Former Smoker     Packs/day: 0.50     Types: Cigarettes     Start date: 1/1/1974     Quit date: 1981     Smokeless tobacco: Never Used     Alcohol use No      Comment: Now quit since Oct 31     Drug use: No     Sexual activity: No     Other Topics Concern     Parent/Sibling W/ Cabg, Mi Or Angioplasty Before 65f 55m? Yes     Brother     Social History Narrative       B:   The patient has been ill for 2 day(s) and during this time the symptoms have increased.  In the ED was diagnosed with   Final diagnoses:   Abdominal pain    Infection/sepsis suspected:No Isolation type; No active isolations   A:   In the ED these meds were given:   Medications   0.9% sodium chloride BOLUS (1,000 mLs Intravenous New Bag 12/9/17 0620)     Followed by   0.9% sodium chloride BOLUS (0 mLs Intravenous Stopped 12/9/17 0443)   ondansetron (ZOFRAN) injection 4 mg (4 mg Intravenous Given 12/9/17 0620)   iopamidol (ISOVUE-370) solution 105 mL (105 mLs " "Intravenous Given 12/9/17 0453)   sodium chloride (PF) 0.9% PF flush 76 mL (76 mLs Intravenous Given 12/9/17 0453)     Drips running?  No  Labs results   Labs Ordered and Resulted from Time of ED Arrival Up to the Time of Departure from the ED   CBC WITH PLATELETS DIFFERENTIAL - Abnormal; Notable for the following:        Result Value    WBC 14.5 (*)     Absolute Neutrophil 10.1 (*)     Absolute Monocytes 2.4 (*)     All other components within normal limits   COMPREHENSIVE METABOLIC PANEL - Abnormal; Notable for the following:     Glucose 137 (*)     Albumin 3.0 (*)     All other components within normal limits   ROUTINE UA WITH MICROSCOPIC REFLEX TO CULTURE - Abnormal; Notable for the following:     Blood Urine Small (*)     Protein Albumin Urine 10 (*)     RBC Urine 3 (*)     Mucous Urine Present (*)     All other components within normal limits   LIPASE   LACTIC ACID WHOLE BLOOD   INR     Imaging Studies:   Recent Results (from the past 24 hour(s))   CT Chest/Abdomen/Pelvis w Contrast    Impression    Impression:   1. Findings consistent with known necrotizing pancreatitis. The  associated acute necrotic collection has decreased in size with  cystogastrostomy drains in position. Tiny foci of air within the  collection is likely postprocedural but may represent infection.  2. Unchanged mass in the pancreatic tail which which was shown to be  adenocarcinoma by FNA.  3. Scattered tiny pulmonary nodules. Recommend three-month follow-up  with CT chest in the setting of malignancy.  4. Mildly prominent mesenteric lymph nodes are likely reactive.     Recent vital signs /61  Pulse 73  Temp 98.7  F (37.1  C) (Oral)  Resp 18  Ht 1.651 m (5' 5\")  Wt 78.4 kg (172 lb 13.5 oz)  SpO2 96%  BMI 28.76 kg/m2  Cardiac Rhythm: ,      Abnormal labs/tests/findings requiring intervention:---  Pain control: fair (patient declined pain meds in ER)  Nausea control: fair  R:   Transfer assistance needed: Stand with Assist " (reports some lightheadedness with standing, but is able to walk to bathroom)  Family present during ED course? Yes   Family currently present? Yes  Pt needs tele? No  Code Status: Full Code (per chart)  Tasks needing to be completed:N/A    Amisha Estrella, RN    [phone]

## 2017-12-09 NOTE — ED NOTES
Attempted to contact nurse on 5B regarding report note on patient, nurse was unavailable and will call ER when able to take patient. Charge RN updated.

## 2017-12-09 NOTE — IP AVS SNAPSHOT
[address] [address]    Phone:  [phone]                                       After Visit Summary   12/9/2017    Kitty Bangura    MRN: [id number]           After Visit Summary Signature Page     I have received my discharge instructions, and my questions have been answered. I have discussed any challenges I see with this plan with the nurse or doctor.    ..........................................................................................................................................  Patient/Patient Representative Signature      ..........................................................................................................................................  Patient Representative Print Name and Relationship to Patient    ..................................................               ................................................  Date                                            Time    ..........................................................................................................................................  Reviewed by Signature/Title    ...................................................              ..............................................  Date                                                            Time

## 2017-12-09 NOTE — PLAN OF CARE
"Problem: Patient Care Overview  Goal: Plan of Care/Patient Progress Review  Outcome: No Change  BP 92/51  Pulse 80  Temp 100.1  F (37.8  C) (Oral)  Resp 18  Ht 1.651 m (5' 5\")  Wt 80.8 kg (178 lb 2.1 oz)  SpO2 97%  BMI 29.64 kg/m2    Pt arrived to unit from ED around 0940. Pt settled to room.    Pt A and O. Lung sounds clear on RA. HR and pulses WDL. Trace LE edema. Bowel sounds active. Abd pain on palpation (epigastric, RUQ, and LLQ). LBM yesterday. Pt up ind. Adequate PO intake (pt advanced to low fat diet from NPO) and UOP. PIV in RUE infusing LR at 250 mL/hr. Pt started in IV abx. No skin concerns.    Continue to monitor.       "

## 2017-12-09 NOTE — H&P
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     #Necrotizing pancreatitis   Hx of necrotizing pancreatitis s/p EUS w/ cystogastrostomy and FNA of panc mass on 11/29 and EGD necrosectomy w/ stent replacement on 12/4. Followed by Dr. Grajeda. CT abd today with decreasing size in necrotic collection. Per GI, collection was well drained prior. Concern for possible infection, as patient says her symptoms are similar to prior admission when she needed drainage. Mild leukocytosis. She has remained vitally stable and afebrile, thought her temperature is creeping up. Will continue to monitor  - GI consulted, appreciate recs  - IV zosyn for possible infection  - 250ml/Hr LR  - MRI abd with EOVIST  - Will monitor, plan for procedure Monday if she is not doing better/looks more infected  - Creon and low fat diet   - Pain control with tylenol, will increase if needed but adequately controled    #Likely Mucinous Cystadenocarcinoma  Tissue showing adenocarcinoma. More likely mucinous cystadenocarcinoma rather than pancreatic adenocarcinoma. Pt states she is in discussions about resection and treatment options. CT with nodule in the lungs, possibly concerning for progression of disease.           FEN: Low fat diet  Lines: PIV  DVT: Mechanical  Code status: Full  Dispo: Antibiotics and monitoring for improvement with possible procedural intervention on Monday     Clinical decision making discussed with Dr. 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 She notes that she was feeling well for a few days post procedure on 12/4. Yesterday morning she started to feel poor, with decreased appetite and nausea. She also developed abdominal pain most prominent in her RUQ and LLL. She notes that the pain has gradually increased. Pain is described as sharp and intermittent, at this point coming in short bouts at least every hour. Lying still helps to relieve pain. Eating may intensify it. Pain reaches 8/10 and subsides fully after the attack. She has not had any episodes of vomiting. She notes she has felt febrile and had night sweats. She also reports dark urine and light colored BMs. She denies CP, SOB, dizziness, or other concerns             Past Medical History:     Past Medical History:   Diagnosis Date     Cancer (H)      Necrotizing pancreatitis      PONV (postoperative nausea and vomiting)              Past Surgical History:      Past Surgical History:   Procedure Laterality Date     ABDOMEN SURGERY  1983    Ruptured tubal pregnancies, additional surgeries followed     BACK SURGERY  2004    L5, S1 discectomy     BREAST SURGERY  2003    Breast reduction     COLONOSCOPY  2008     ENDOSCOPIC ULTRASOUND, ESOPHAGOSCOPY, GASTROSCOPY, DUODENOSCOPY (EGD), NECROSECTOMY N/A 12/4/2017    Procedure: ENDOSCOPIC ULTRASOUND, ESOPHAGOSCOPY, GASTROSCOPY, DUODENOSCOPY (EGD), NECROSECTOMY;  Esophagogastroduodenoscopy, with  Necrosectomy and stents replacement  ;  Surgeon: González Metz MD;  Location: [facility] OR     ESOPHAGOSCOPY, GASTROSCOPY, DUODENOSCOPY (EGD), COMBINED N/A 11/29/2017    Procedure: COMBINED ENDOSCOPIC ULTRASOUND, ESOPHAGOSCOPY, GASTROSCOPY, DUODENOSCOPY (EGD), FINE NEEDLE ASPIRATE/BIOPSY;  Endoscopic Ultrasound with  cystgastrostomy and fine needle aspiration ;  Surgeon: González Metz MD;  Location: [facility] OR     GYN SURGERY      Multiple ectopic pregnancies, reconnect,               Social History:   Lives with , son and son's girlfriend in [place]. She works as a . [place]   Tobacco use: Quit 34 years ago   Alcohol use: Non since  when was diagnosed with pancreatitis, social drinker prior   Denies other drugs         Family History:     Family History   Problem Relation Age of Onset     HEART DISEASE Father      Hyperlipidemia Father      HEART DISEASE Brother      DIABETES Mother      Hypertension Mother      Obesity Mother      DIABETES Maternal Grandfather      Hypertension Maternal Grandfather      Obesity Maternal Grandfather      DIABETES Sister      Hypertension Sister      Depression Sister      Anxiety Disorder Sister      Obesity Sister      Hypertension Brother      Hyperlipidemia Brother      Breast Cancer Cousin      Breast Cancer Cousin      Breast Cancer Cousin      Colon Cancer Other      Other Cancer Other      Mesothelioma     Depression Sister      Anxiety Disorder Brother      Anxiety Disorder Son      MENTAL ILLNESS Sister      Schizophrenia     Obesity Maternal Grandmother      Obesity Sister               Allergies:   No Known Allergies          Medications:     Prescriptions Prior to Admission   Medication Sig Dispense Refill Last Dose     ciprofloxacin (CIPRO) 500 MG tablet Take 1 tablet (500 mg) by mouth 2 times daily 28 tablet 0 2017 at 1800     amylase-lipase-protease (CREON) 73317-38228 UNITS CPEP per EC capsule Take 2-3 with meals / 1-2 with snacks, up to 15 per day. 450 capsule 6 2017 at 1830     acetaminophen (TYLENOL) 325 MG tablet Take 2 tablets (650 mg) by mouth every 4 hours as needed for mild pain 100 tablet  2017 at 1700             Review of Systems:   10 pt ROS negative besides noted above  "in HPI.           Physical Exam:   Vitals were reviewed  BP 92/51  Pulse 80  Temp 100.1  F (37.8  C) (Oral)  Resp 18  Ht 1.651 m (5' 5\")  Wt 80.8 kg (178 lb 2.1 oz)  SpO2 97%  BMI 29.64 kg/m2    Exam:  General: the patient is pleasant, resting comfortably, no acute distress.  HEENT: Normocephalic, atraumatic. MMM, no cervical lymphadenopathy  Lungs: Clear to auscultation, no wheezes or crackles.   CV: RRR, nl s1 and s2, no m/r/g.   Abdomen: Soft, nondistended, very tender in RUQ and epigastric region. Tender in LL and somewhat in RLQ. No rebound or guarding. Bowel sounds active.  Extremities: No lower extremity edema. Peripheral pulses strong and symmetric. ROM full  Skin: no appreciable skin lesions/rashes   Neuro: Alert and oriented x4, Strength 5/5 in all major muscle groups, sensation intact throughout, CN 2-12 grossly intact, no focal deficits   Psych: normal affect, answering questions appropriately. No obvious depression or anxiety.           Data:   Labs:   Results for orders placed or performed during the hospital encounter of 12/09/17 (from the past 24 hour(s))   CBC with platelets differential   Result Value Ref Range    WBC 14.5 (H) 4.0 - 11.0 10e9/L    RBC Count 3.90 3.8 - 5.2 10e12/L    Hemoglobin 12.0 11.7 - 15.7 g/dL    Hematocrit 37.4 35.0 - 47.0 %    MCV 96 78 - 100 fl    MCH 30.8 26.5 - 33.0 pg    MCHC 32.1 31.5 - 36.5 g/dL    RDW 12.9 10.0 - 15.0 %    Platelet Count 336 150 - 450 10e9/L    Diff Method Automated Method     % Neutrophils 69.6 %    % Lymphocytes 13.0 %    % Monocytes 16.6 %    % Eosinophils 0.4 %    % Basophils 0.2 %    % Immature Granulocytes 0.2 %    Nucleated RBCs 0 0 /100    Absolute Neutrophil 10.1 (H) 1.6 - 8.3 10e9/L    Absolute Lymphocytes 1.9 0.8 - 5.3 10e9/L    Absolute Monocytes 2.4 (H) 0.0 - 1.3 10e9/L    Absolute Eosinophils 0.1 0.0 - 0.7 10e9/L    Absolute Basophils 0.0 0.0 - 0.2 10e9/L    Abs Immature Granulocytes 0.0 0 - 0.4 10e9/L    Absolute Nucleated RBC " 0.0     Platelet Estimate Confirming automated cell count    Comprehensive metabolic panel   Result Value Ref Range    Sodium 138 133 - 144 mmol/L    Potassium 4.2 3.4 - 5.3 mmol/L    Chloride 105 94 - 109 mmol/L    Carbon Dioxide 26 20 - 32 mmol/L    Anion Gap 6 3 - 14 mmol/L    Glucose 137 (H) 70 - 99 mg/dL    Urea Nitrogen 11 7 - 30 mg/dL    Creatinine 0.77 0.52 - 1.04 mg/dL    GFR Estimate 76 >60 mL/min/1.7m2    GFR Estimate If Black >90 >60 mL/min/1.7m2    Calcium 9.0 8.5 - 10.1 mg/dL    Bilirubin Total 0.5 0.2 - 1.3 mg/dL    Albumin 3.0 (L) 3.4 - 5.0 g/dL    Protein Total 7.8 6.8 - 8.8 g/dL    Alkaline Phosphatase 80 40 - 150 U/L    ALT 42 0 - 50 U/L    AST 21 0 - 45 U/L   Lipase   Result Value Ref Range    Lipase 189 73 - 393 U/L   Lactic acid whole blood   Result Value Ref Range    Lactic Acid 0.8 0.7 - 2.0 mmol/L   INR   Result Value Ref Range    INR 1.08 0.86 - 1.14   UA with Microscopic reflex to Culture   Result Value Ref Range    Color Urine Yellow     Appearance Urine Clear     Glucose Urine Negative NEG^Negative mg/dL    Bilirubin Urine Negative NEG^Negative    Ketones Urine Negative NEG^Negative mg/dL    Specific Gravity Urine 1.020 1.003 - 1.035    Blood Urine Small (A) NEG^Negative    pH Urine 5.5 5.0 - 7.0 pH    Protein Albumin Urine 10 (A) NEG^Negative mg/dL    Urobilinogen mg/dL Normal 0.0 - 2.0 mg/dL    Nitrite Urine Negative NEG^Negative    Leukocyte Esterase Urine Negative NEG^Negative    Source Midstream Urine     WBC Urine 1 0 - 2 /HPF    RBC Urine 3 (H) 0 - 2 /HPF    Squamous Epithelial /HPF Urine 1 0 - 1 /HPF    Mucous Urine Present (A) NEG^Negative /LPF   CT Chest/Abdomen/Pelvis w Contrast    Narrative    Exam: CT of the chest, abdomen, and pelvis with contrast 12/9/2017  5:19 AM     History: Suspected malignancy     Comparison: CT 11/10/2017, 11/5/2017.    Technique: Helical acquisition from the thoracic inlet to the pubic  symphysis with the administration of IV.  Axial and  coronal  reconstructions were viewed in bone, soft tissue, and lung windows.     Findings:     Chest: Lungs are relatively clear. The heart and great vessels are  normal in appearance.  There is no mediastinal or axillary  lymphadenopathy.  No pleural or pericardial effusion. Normal  appearance of the visualized thyroid gland.     Scattered tiny pulmonary nodules seen on series 4, without comparison:  2 mm, right lower lobe, image 81  2 mm, lingula, image 89  3 mm, left upper lobe, image 70, adjacent to mediastinum  2 mm, right upper lobe, image 55  2 mm, right upper lobe, image 57  Other small scattered pulmonary nodules are also visualized    Abdomen/pelvis:   Findings consistent with known necrotizing pancreatitis, with multiple  surrounding fluid collection with rim enhancement. The largest  collection adjacent and anterior to the pancreatic head has decreased  in size no measuring up to 8.3 x 4.4 x 7.0 cm. Gastric drainage  catheters in place. Air within the collection is likely from placement  of the drain. No definite loculated fluid component, there is  significant soft tissue thickening and fat stranding surrounding this  area. A mass in the pancreatic tail up to 3.2 x 2.8 x 2.6 cm. It is  largely unchanged in size. There is both solid and cystic components  with calcified rim. Significant fat stranding and reactive enlarged  peripancreatic lymph nodes.  Tiny hypodense lesion of the right kidney to small to characterize,  unchanged. Otherwise unremarkable appearance of the kidneys.. 2  hypodense lesions in the right hepatic lobe are again seen, largest  measuring 1 cm and previously characterized as a hemangioma (series 3  image 290). Smaller lesion on series 3 image 261 measures 7 mm and  there is difficult to characterize due to small size. Unchanged area  wedge-shaped relative hypodensity in the right upper lobe. The  gallbladder, spleen, adrenals, are normal in appearance.      There are no dilated  loops of small or large bowel. There is no bowel  wall thickening.  Mild reactive fat stranding of the right colon. The  appendix and terminal ileum are normal in appearance. No free  intraperitoneal air or fluid.  The urinary bladder is within normal  limits.    The aorta and IVC are normal in caliber.  The splenic vein and portal  veins are patent.      Mild degenerative findings of the spine. There are no aggressive  appearing bone lesions.      Impression    Impression:   1. Findings consistent with known necrotizing pancreatitis. The  associated acute necrotic collection has decreased in size with  cystogastrostomy drains in position. Tiny foci of air within the  collection is likely postprocedural but may represent infection.  2. Unchanged mass in the pancreatic tail which which was shown to be  adenocarcinoma by FNA.  3. Scattered tiny pulmonary nodules measuring less than 3 mm.  4. Mildly prominent mesenteric lymph nodes are likely reactive.  5. Unchanged small hypodensities in the right hepatic lobe, one of  which is previously characterized as hemangioma and the other which is  too small to characterize on this study. Liver MRI with Eovist could  be considered for additional characterization.    I have personally reviewed the examination and initial interpretation  and I agree with the findings.    SUSAN CAMARENA MD

## 2017-12-09 NOTE — ED PROVIDER NOTES
"  History     Chief Complaint   Patient presents with     Abdominal Pain     Fever     HPI  Kitty Bangura is a 57 year old female who presents for abdominal pain, nausea and fever.  History of necrotizing pancreatitis, recent biopsy suggestive of malignancy.   Recently underwent necrosectomy and stent placement.   Over the last 2 days developed increasing abdominal pain worse in the right upper quadrant epigastric area as well as left lower quadrant.  Episode lasts seconds to several minutes, right upper quadrant pain is somewhat dull and continuous.  Today with fever to 101.  Feeling weak very nauseous now.  No chest pain or shortness of breath.  Occasional cough productive of white phlegm.  No recent trauma    I have reviewed the Medications, Allergies, Past Medical and Surgical History, and Social History in the Epic system.    Review of Systems   14 point review of symptoms was performed and is negative except as noted above.     Physical Exam   BP: 97/64  Pulse: 108  Temp: 98.7  F (37.1  C)  Resp: 18  Height: 165.1 cm (5' 5\")  Weight: 78.4 kg (172 lb 13.5 oz)  SpO2: 95 %      Physical Exam  GEN: Well appearing, nauseous, nontoxic.  HEENT: The head is normocephalic and atraumatic. Pupils are equal round and reactive to light. Extraocular motions are intact. There is no facial swelling. The neck is nontender and supple.   CV: Regular rate and rhythm without murmurs rubs or gallops. 2+ radial pulses bilaterally.  PULM: Clear to auscultation bilaterally.  ABD: Soft, epigastric and right upper quadrant tenderness to palpation.  Not distended.  EXT: Full range of motion.  No edema.  NEURO: Cranial nerves II through XII are intact and symmetric. Bilateral upper and lower extremities grossly show full range of motion without any focal deficits.   SKIN: No rashes, ecchymosis, or lacerations  PSYCH: Calm and cooperative, interactive.     ED Course     ED Course     Procedures               Labs Ordered and Resulted " from Time of ED Arrival Up to the Time of Departure from the ED   CBC WITH PLATELETS DIFFERENTIAL - Abnormal; Notable for the following:        Result Value    WBC 14.5 (*)     Absolute Neutrophil 10.1 (*)     Absolute Monocytes 2.4 (*)     All other components within normal limits   COMPREHENSIVE METABOLIC PANEL - Abnormal; Notable for the following:     Glucose 137 (*)     Albumin 3.0 (*)     All other components within normal limits   ROUTINE UA WITH MICROSCOPIC REFLEX TO CULTURE - Abnormal; Notable for the following:     Blood Urine Small (*)     Protein Albumin Urine 10 (*)     RBC Urine 3 (*)     Mucous Urine Present (*)     All other components within normal limits   LIPASE   LACTIC ACID WHOLE BLOOD   INR            Assessments & Plan (with Medical Decision Making)   57-year-old female with recently diagnosed pancreatic cancer, status post necrosectomy and stent placement presenting with right upper quadrant, epigastric abdominal pain and fever.  Differential diagnosis broad: Including stent displacement, cholangitis, pancreatitis with superimposed infection, bowel obstruction, colitis, bowel perforation, among other causes.    Patient received IV hydration, analgesics, antiemetics resulting in symptom improvement.  Labs were performed and revealing only for wbc 14  UA neg.     CT abdomen/pelvis with contrast, and noncontrast chest CT performed showing showing no acute changes    Discussed with GI and admitted to IM for Gi re-evaluation in am and possible repeat endoscopy         I have reviewed the nursing notes.    I have reviewed the findings, diagnosis, plan and need for follow up with the patient.    New Prescriptions    No medications on file       Final diagnoses:   Abdominal pain       12/9/2017   [facility], [place], EMERGENCY DEPARTMENT     Madhu Adams MD  12/09/17 0754

## 2017-12-09 NOTE — IP AVS SNAPSHOT
MRN:[id number]                      After Visit Summary   12/9/2017    Kitty Bangura    MRN: [id number]           Thank you!     Thank you for choosing [facility] for your care. Our goal is always to provide you with excellent care. Hearing back from our patients is one way we can continue to improve our services. Please take a few minutes to complete the written survey that you may receive in the mail after you visit with us. Thank you!        Patient Information     Date Of Birth          1960        Designated Caregiver       Most Recent Value    Caregiver    Will someone help with your care after discharge? yes    Name of designated caregiver Andrew []    Phone number of caregiver [phone]    Caregiver address [address] [place], [place] [address]      About your hospital stay     You were admitted on:  December 9, 2017 You last received care in the:  [address] [place] [address]    You were discharged on:  December 13, 2017        Reason for your hospital stay       You were hospitalized for necrotizing pancreatitis. You should take the cipro antibiotics until you are able to return on Thursday for your procedure.   Should you spike fevers or have worsening abdominal pain, please come back to the hospital.                  Who to Call     For medical emergencies, please call 911.  For non-urgent questions about your medical care, please call your primary care provider or clinic, [phone]  For questions related to your surgery, please call your surgery clinic        Attending Provider     Provider Specialty    Madhu Adams MD Emergency Medicine    Tejas, MD [facility]    Petr Mnedoza MD Internal Medicine    Deisi Monsivais MD Pediatrics       Primary Care Provider Office Phone # Fax #    Josenargis Julito Mcgill [phone]      After Care Instructions     Activity       Your activity upon discharge: activity as tolerated             Diet       Follow this diet upon discharge: Low fat diet            Discharge Instructions       Resume pre procedure diet            Discharge Instructions       Restart home medications.                  Follow-up Appointments     Adult [facility] Follow-up and recommended labs and tests       Appointments on [place] and/or [facility] (with [facility] or [facility] provider or service). Call [phone] if you haven't heard regarding these appointments within 7 days of discharge.    F/u CT abdomen at [facility] in 2 weeks                  Your next 10 appointments already scheduled     Jan 08, 2018  8:00 AM CST   (Arrive by 7:30 AM)   New Patient Visit with Jovany Monk MD   [facility]    [address] [place] [id number] [phone]            Jan 08, 2018  9:30 AM CST   (Arrive by 9:15 AM)   New Patient Visit with Ja Byrd MD   [facility] ([facility])    [address] [place] [id number] [phone]              Additional Information     If you use hormonal birth control (such as the pill, patch, ring or implants): You'll need a second form of birth control for 7 days (condoms, a diaphragm or contraceptive foam). While in the hospital, you received a medicine called Bridion. Your normal birth control will not work as well for a week after taking this medicine.          Pending Results     Date and Time Order Name Status Description    12/10/2017 0933 Blood culture Preliminary     12/10/2017 0933 Blood culture Preliminary             Statement of Approval     Ordered          12/13/17 1624  I have reviewed and agree with all the recommendations and orders detailed in this document.  EFFECTIVE NOW     Approved and electronically signed by:  Whitney Cowan MD             Admission Information     Date & Time Provider Department Dept. Phone     "12/9/2017 Deisi Monsivais MD [address] [facility] [phone]      Your Vitals Were     Blood Pressure Pulse Temperature Respirations Height Weight    88/51 (BP Location: Left arm) 58 96.7  F (35.9  C) (Oral) 18 1.651 m (5' 5\") 81.7 kg (180 lb 3.2 oz)    Pulse Oximetry BMI (Body Mass Index)                97% 29.99 kg/m2          [employer] Everytime Information     [employer] gives you secure access to your electronic health record. If you see a primary care provider, you can also send messages to your care team and make appointments. If you have questions, please call your primary care clinic.  If you do not have a primary care provider, please call [phone] and they will assist you.        Care EveryWhere ID     This is your Care EveryWhere ID. This could be used by other organizations to access your [facility] medical records  [id number]        Equal Access to Services     CHASE DE GUZMAN AH: Tre Ruiz, wajosephineda grady, qaybta kaalmafredy ferguson, re yip. So [facility] [phone].    ATENCIÓN: Si habla español, tiene a quiros disposición servicios gratuitos de asistencia lingüística. Llame al [phone].    We comply with applicable federal civil rights laws and [place] laws. We do not discriminate on the basis of race, color, national origin, age, disability, sex, sexual orientation, or gender identity.               Review of your medicines      START taking        Dose / Directions    metroNIDAZOLE 500 MG tablet   Commonly known as:  FLAGYL        Dose:  500 mg   Take 1 tablet (500 mg) by mouth 2 times daily   Quantity:  20 tablet   Refills:  0         CONTINUE these medicines which have NOT CHANGED        Dose / Directions    acetaminophen 325 MG tablet   Commonly known as:  TYLENOL   Used for:  Acute pancreatitis without infection or necrosis, unspecified pancreatitis type        Dose:  650 mg   Take 2 tablets (650 mg) by mouth every 4 hours as needed for mild pain "   Quantity:  100 tablet   Refills:  0       amylase-lipase-protease [id number] UNITS Cpep per EC capsule   Commonly known as:  CREON        Take 2-3 with meals / 1-2 with snacks, up to 15 per day.   Quantity:  450 capsule   Refills:  6       ciprofloxacin 500 MG tablet   Commonly known as:  CIPRO        Dose:  500 mg   Take 1 tablet (500 mg) by mouth 2 times daily   Quantity:  20 tablet   Refills:  0            Where to get your medicines      These medications were sent to [facility] [place], [place] - [address]  [address] [address]     Phone:  [phone]     ciprofloxacin 500 MG tablet    metroNIDAZOLE 500 MG tablet               ANTIBIOTIC INSTRUCTION     You've Been Prescribed an Antibiotic - Now What?  Your healthcare team thinks that you or your loved one might have an infection. Some infections can be treated with antibiotics, which are powerful, life-saving drugs. Like all medications, antibiotics have side effects and should only be used when necessary. There are some important things you should know about your antibiotic treatment.      Your healthcare team may run tests before you start taking an antibiotic.    Your team may take samples (e.g., from your blood, urine or other areas) to run tests to look for bacteria. These test can be important to determine if you need an antibiotic at all and, if you do, which antibiotic will work best.      Within a few days, your healthcare team might change or even stop your antibiotic.    Your team may start you on an antibiotic while they are working to find out what is making you sick.    Your team might change your antibiotic because test results show that a different antibiotic would be better to treat your infection.    In some cases, once your team has more information, they learn that you do not need an antibiotic at all. They may find out that you don't have an infection, or that the antibiotic you're  taking won't work against your infection. For example, an infection caused by a virus can't be treated with antibiotics. Staying on an antibiotic when you don't need it is more likely to be harmful than helpful.      You may experience side effects from your antibiotic.    Like all medications, antibiotics have side effects. Some of these can be serious.    Let you healthcare team know if you have any known allergies when you are admitted to the hospital.    One significant side effect of nearly all antibiotics is the risk of severe and sometimes deadly diarrhea caused by Clostridium difficile (C. Difficile). This occurs when a person takes antibiotics because some good germs are destroyed. Antibiotic use allows C. diificile to take over, putting patients at high risk for this serious infection.    As a patient or caregiver, it is important to understand your or your loved one's antibiotic treatment. It is especially important for caregivers to speak up when patients can't speak for themselves. Here are some important questions to ask your healthcare team.    What infection is this antibiotic treating and how do you know I have that infection?    What side effects might occur from this antibiotic?    How long will I need to take this antibiotic?    Is it safe to take this antibiotic with other medications or supplements (e.g., vitamins) that I am taking?     Are there any special directions I need to know about taking this antibiotic? For example, should I take it with food?    How will I be monitored to know whether my infection is responding to the antibiotic?    What tests may help to make sure the right antibiotic is prescribed for me?      Information provided by:  www.cdc.gov/getsmart  U.S. Department of Health and Human Services  Centers for disease Control and Prevention  National Center for Emerging and Zoonotic Infectious Diseases  Division of Healthcare Quality Promotion         Protect others around you:  Learn how to safely use, store and throw away your medicines at www.disposemymeds.org.             Medication List: This is a list of all your medications and when to take them. Check marks below indicate your daily home schedule. Keep this list as a reference.      Medications           Morning Afternoon Evening Bedtime As Needed    acetaminophen 325 MG tablet   Commonly known as:  TYLENOL   Take 2 tablets (650 mg) by mouth every 4 hours as needed for mild pain   Last time this was given:  650 mg on 12/11/2017  5:25 PM                                amylase-lipase-protease [id number] UNITS Cpep per EC capsule   Commonly known as:  CREON   Take 2-3 with meals / 1-2 with snacks, up to 15 per day.   Last time this was given:  72,000 Units on 12/13/2017 12:54 PM                                ciprofloxacin 500 MG tablet   Commonly known as:  CIPRO   Take 1 tablet (500 mg) by mouth 2 times daily                                metroNIDAZOLE 500 MG tablet   Commonly known as:  FLAGYL   Take 1 tablet (500 mg) by mouth 2 times daily

## 2017-12-09 NOTE — ED NOTES
Patient presents with c/o abdominal pain, nausea, and fever. Patient reports abdominal pain since yesterday, temp max of 101.3 at home. Patient has hx of necrotizing pancreatitis and is currently on PO Cipro. Afebrile during triage.

## 2017-12-10 ENCOUNTER — APPOINTMENT (OUTPATIENT)
Dept: MRI IMAGING | Facility: CLINIC | Age: 57
DRG: 406 | End: 2017-12-10

## 2017-12-10 LAB
ANION GAP SERPL CALCULATED.3IONS-SCNC: 5 MMOL/L (ref 3–14)
BUN SERPL-MCNC: 6 MG/DL (ref 7–30)
CALCIUM SERPL-MCNC: 8.7 MG/DL (ref 8.5–10.1)
CHLORIDE SERPL-SCNC: 110 MMOL/L (ref 94–109)
CO2 SERPL-SCNC: 28 MMOL/L (ref 20–32)
CREAT SERPL-MCNC: 0.87 MG/DL (ref 0.52–1.04)
ERYTHROCYTE [DISTWIDTH] IN BLOOD BY AUTOMATED COUNT: 13.1 % (ref 10–15)
GFR SERPL CREATININE-BSD FRML MDRD: 67 ML/MIN/1.7M2
GLUCOSE SERPL-MCNC: 100 MG/DL (ref 70–99)
HCT VFR BLD AUTO: 34.2 % (ref 35–47)
HGB BLD-MCNC: 10.6 G/DL (ref 11.7–15.7)
LACTATE BLD-SCNC: 0.6 MMOL/L (ref 0.7–2)
LACTATE BLD-SCNC: 0.7 MMOL/L (ref 0.7–2)
MCH RBC QN AUTO: 30 PG (ref 26.5–33)
MCHC RBC AUTO-ENTMCNC: 31 G/DL (ref 31.5–36.5)
MCV RBC AUTO: 97 FL (ref 78–100)
PLATELET # BLD AUTO: 283 10E9/L (ref 150–450)
POTASSIUM SERPL-SCNC: 4.4 MMOL/L (ref 3.4–5.3)
RBC # BLD AUTO: 3.53 10E12/L (ref 3.8–5.2)
SODIUM SERPL-SCNC: 144 MMOL/L (ref 133–144)
WBC # BLD AUTO: 12.7 10E9/L (ref 4–11)

## 2017-12-10 PROCEDURE — 25000128 H RX IP 250 OP 636: Performed by: STUDENT IN AN ORGANIZED HEALTH CARE EDUCATION/TRAINING PROGRAM

## 2017-12-10 PROCEDURE — 36415 COLL VENOUS BLD VENIPUNCTURE: CPT | Performed by: INTERNAL MEDICINE

## 2017-12-10 PROCEDURE — 12000001 ZZH R&B MED SURG/OB UMMC

## 2017-12-10 PROCEDURE — 36415 COLL VENOUS BLD VENIPUNCTURE: CPT | Performed by: STUDENT IN AN ORGANIZED HEALTH CARE EDUCATION/TRAINING PROGRAM

## 2017-12-10 PROCEDURE — 85027 COMPLETE CBC AUTOMATED: CPT | Performed by: STUDENT IN AN ORGANIZED HEALTH CARE EDUCATION/TRAINING PROGRAM

## 2017-12-10 PROCEDURE — A9581 GADOXETATE DISODIUM INJ: HCPCS | Performed by: RADIOLOGY

## 2017-12-10 PROCEDURE — 25500064 ZZH RX 255 OP 636: Performed by: RADIOLOGY

## 2017-12-10 PROCEDURE — 74183 MRI ABD W/O CNTR FLWD CNTR: CPT

## 2017-12-10 PROCEDURE — 80048 BASIC METABOLIC PNL TOTAL CA: CPT | Performed by: STUDENT IN AN ORGANIZED HEALTH CARE EDUCATION/TRAINING PROGRAM

## 2017-12-10 PROCEDURE — 83605 ASSAY OF LACTIC ACID: CPT | Performed by: INTERNAL MEDICINE

## 2017-12-10 PROCEDURE — 99233 SBSQ HOSP IP/OBS HIGH 50: CPT | Mod: GC | Performed by: INTERNAL MEDICINE

## 2017-12-10 PROCEDURE — 83605 ASSAY OF LACTIC ACID: CPT | Performed by: STUDENT IN AN ORGANIZED HEALTH CARE EDUCATION/TRAINING PROGRAM

## 2017-12-10 PROCEDURE — 25000132 ZZH RX MED GY IP 250 OP 250 PS 637: Performed by: STUDENT IN AN ORGANIZED HEALTH CARE EDUCATION/TRAINING PROGRAM

## 2017-12-10 PROCEDURE — 87040 BLOOD CULTURE FOR BACTERIA: CPT | Performed by: STUDENT IN AN ORGANIZED HEALTH CARE EDUCATION/TRAINING PROGRAM

## 2017-12-10 RX ORDER — SODIUM CHLORIDE, SODIUM LACTATE, POTASSIUM CHLORIDE, CALCIUM CHLORIDE 600; 310; 30; 20 MG/100ML; MG/100ML; MG/100ML; MG/100ML
INJECTION, SOLUTION INTRAVENOUS CONTINUOUS
Status: DISCONTINUED | OUTPATIENT
Start: 2017-12-10 | End: 2017-12-12

## 2017-12-10 RX ADMIN — ACETAMINOPHEN 650 MG: 325 TABLET, FILM COATED ORAL at 08:23

## 2017-12-10 RX ADMIN — SODIUM CHLORIDE, POTASSIUM CHLORIDE, SODIUM LACTATE AND CALCIUM CHLORIDE: 600; 310; 30; 20 INJECTION, SOLUTION INTRAVENOUS at 06:34

## 2017-12-10 RX ADMIN — PIPERACILLIN SODIUM AND TAZOBACTAM SODIUM 3.38 G: 36; 4.5 INJECTION, POWDER, LYOPHILIZED, FOR SOLUTION INTRAVENOUS at 11:43

## 2017-12-10 RX ADMIN — GADOXETATE DISODIUM 10 ML: 181.43 INJECTION, SOLUTION INTRAVENOUS at 12:12

## 2017-12-10 RX ADMIN — PIPERACILLIN SODIUM AND TAZOBACTAM SODIUM 3.38 G: 36; 4.5 INJECTION, POWDER, LYOPHILIZED, FOR SOLUTION INTRAVENOUS at 18:35

## 2017-12-10 RX ADMIN — PIPERACILLIN SODIUM AND TAZOBACTAM SODIUM 3.38 G: 36; 4.5 INJECTION, POWDER, LYOPHILIZED, FOR SOLUTION INTRAVENOUS at 06:34

## 2017-12-10 RX ADMIN — ACETAMINOPHEN 650 MG: 325 TABLET, FILM COATED ORAL at 22:54

## 2017-12-10 RX ADMIN — PANCRELIPASE 72000 UNITS: 24000; 76000; 120000 CAPSULE, DELAYED RELEASE PELLETS ORAL at 17:54

## 2017-12-10 RX ADMIN — ACETAMINOPHEN 650 MG: 325 TABLET, FILM COATED ORAL at 16:21

## 2017-12-10 NOTE — PLAN OF CARE
Problem: Patient Care Overview  Goal: Plan of Care/Patient Progress Review  Outcome: No Change  Pt alert and oriented. VSS on ra with exception of low BPs; asymptomatic. Afebrile. Up ad soni. Denies pain however took tylenol before bed for mild abdominal discomfort. IVMF running at 250 cc/hr through piv. Voiding spontaneously. No BM. Slept through the night. NPO since midnight for abdominal MRI.  R: Continue with poc

## 2017-12-10 NOTE — CONSULTS
GASTROENTEROLOGY CONSULTATION      Date of Admission:  12/9/2017          ASSESSMENT AND RECOMMENDATIONS:   Assessment:   56 yo F with a history of acute necrotizing pancreatitis and recently diagnosed adenocarcinoma of pancreatic tail presents to ED with fever and worsening left sided abdominal pain concerning for infected necrotizing pancreatitis. Initial presentation to [facility] 11/1/2017, etiology of pancreatitis unclear at the time (MRCP negative for stone, normal triglyceride and calcium levels. Had a MRI of back report from 4/2004 mentioned a 2.5 cm rounded focus of increased T2 signal in the region of pancreatic tail.     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 Had endoscopic necrosectomy on 12/4. Finished a 7 day course of Levoflaxicin as outpatient.    Repeated CT today showed stable necrotizing pancreatitis, with low suspicion for infections.  WBC 14.5. infectious work up include negative UA, and negative pneumonia on CT    Recommendations  - LR @ 250 cc/hr   - Continue IV Zosyn   - Advance diet as tolerates   - Recommend Contrast enhanced MRI with Eovist to follow non-specific liver lesions and assess collapse of necrosis cavity.   - GI will continue to follow, please page if any questions.     Gastroenterology follow up recommendations: TBD    Thank you for involving us in this patient's care. Please do not hesitate to contact the GI service with any questions or concerns.     Pt care plan discussed with Dr. Bloom and Dr. Metz, GI staff physician.    Eulogio Sawyer  -------------------------------------------------------------------------------------------------------------------          Chief Complaint:   We were asked by Dr. Cowan of internal medicine to evaluate this patient with necrotizing  pancreatitis     History is obtained from the patient and the medical record.          History of Present Illness:   Kitty Bangura is a 57 year old female with a history of acute necrotizing pancreatitis and recently diagnosed adenocarcinoma of pancreatic tail presents to ED with fever and worsening left sided abdominal pain concerning for infected necrotizing pancreatitis. Pt initially presented with acute necrotizing pancreatitis on 11/1/2017 complicated with large necrotizing collection involving 75% of pancreases, s/p EUS 11/29 found to have large WON drained with Axios stent, also noted a 3.5 cm solid and cystic lesion at pancreatic tail s/p need biopsy, that subsequently revealed adenocarcinoma. 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 Patient subsequently had necrosectomy on 12/4/17, and finished a 7-day course of Levofloxacin (started 11/30 after the EUS drain, and finished 12/7). Yesterday, she developed fever to 101, and worsening intermittent left sided abdominal pain, advised to come to [facility] for further evaluation. Patient took 1 dose of Cirpo and tylenol prior to arrival to [facility]. Upon arrival, remained afebrile. Repeated CT showed previous necrotic collection decreased in size, and stable small hypo densities in right hepatic lobe and scattered tiny pulmonary nodules.             Past Medical History:   Reviewed and edited as appropriate  Past Medical History:   Diagnosis Date     Cancer (H)      Necrotizing pancreatitis      PONV (postoperative nausea and vomiting)             Past Surgical History:   Reviewed and edited as appropriate   Past Surgical History:   Procedure Laterality Date     ABDOMEN SURGERY  1983    Ruptured tubal pregnancies, additional surgeries followed     BACK SURGERY  2004    L5, S1 discectomy     BREAST SURGERY  2003     Breast reduction     COLONOSCOPY       ENDOSCOPIC ULTRASOUND, ESOPHAGOSCOPY, GASTROSCOPY, DUODENOSCOPY (EGD), NECROSECTOMY N/A 2017    Procedure: ENDOSCOPIC ULTRASOUND, ESOPHAGOSCOPY, GASTROSCOPY, DUODENOSCOPY (EGD), NECROSECTOMY;  Esophagogastroduodenoscopy, with  Necrosectomy and stents replacement  ;  Surgeon: González Metz MD;  Location: [facility] OR     ESOPHAGOSCOPY, GASTROSCOPY, DUODENOSCOPY (EGD), COMBINED N/A 2017    Procedure: COMBINED ENDOSCOPIC ULTRASOUND, ESOPHAGOSCOPY, GASTROSCOPY, DUODENOSCOPY (EGD), FINE NEEDLE ASPIRATE/BIOPSY;  Endoscopic Ultrasound with cystgastrostomy and fine needle aspiration ;  Surgeon: González Metz MD;  Location: [facility] OR     GYN SURGERY      Multiple ectopic pregnancies, reconnect,              Previous Endoscopy:   No results found for this or any previous visit.         Social History:   Reviewed and edited as appropriate  Social History     Social History     Marital status:      Spouse name: N/A     Number of children: N/A     Years of education: N/A     Occupational History     Not on file.     Social History Main Topics     Smoking status: Former Smoker     Packs/day: 0.50     Types: Cigarettes     Start date: 1974     Quit date:      Smokeless tobacco: Never Used     Alcohol use No      Comment: Now quit since Oct 31     Drug use: No     Sexual activity: No     Other Topics Concern     Parent/Sibling W/ Cabg, Mi Or Angioplasty Before 65f 55m? Yes     Brother     Social History Narrative            Family History:   Reviewed and edited as appropriate  No known history of gastrointestinal/liver disease or gastrointestinal malignancies       Allergies:   Reviewed and edited as appropriate   No Known Allergies         Medications:     Current Facility-Administered Medications   Medication     naloxone (NARCAN) injection 0.1-0.4 mg     lactated ringers infusion     [START ON 12/10/2017] influenza quadrivalent (PF)  "vacc age 3 yrs and older (FLUZONE or Flulaval) injection 0.5 mL     amylase-lipase-protease (CREON 24) 60095-04173 UNITS per EC capsule 24,000-48,000 Units     amylase-lipase-protease (CREON 24) 22088-45684 UNITS per EC capsule 48,000-72,000 Units     acetaminophen (TYLENOL) tablet 650 mg     piperacillin-tazobactam (ZOSYN) 3.375 in 15 mL NS Premix Syringe             Review of Systems:   A complete review of systems was performed and is negative except as noted in the HPI           Physical Exam:   BP (!) 84/50 (BP Location: Left arm)  Pulse 67  Temp 98.5  F (36.9  C) (Oral)  Resp 16  Ht 1.651 m (5' 5\")  Wt 80.8 kg (178 lb 2.1 oz)  SpO2 97%  BMI 29.64 kg/m2  Wt:   Wt Readings from Last 2 Encounters:   12/09/17 80.8 kg (178 lb 2.1 oz)   12/04/17 78.3 kg (172 lb 9.9 oz)      Constitutional: no acute distress  Eyes: Sclera anicteric/injected  Ears/nose/mouth/throat: Normal oropharynx without ulcers or exudate, mucus membranes moist, hearing intact  Neck: supple, thyroid normal size  CV: RRR, no murmur   Respiratory: clear to auscultation bilaterally, no wheezing or crackles   Lymph: No axillary, submandibular, supraclavicular or inguinal lymphadenopathy  Abd: mild left sided abdominal tenderness, soft, non distended, normal active BS  Skin: warm, perfused, no jaundice  Neuro: AAO x 3, No asterixis  Psych: Normal affect  MSK: No gross deformities         Data:   Labs and imaging below were independently reviewed and interpreted    BMP    Recent Labs  Lab 12/09/17 0358      POTASSIUM 4.2   CHLORIDE 105   ILIANA 9.0   CO2 26   BUN 11   CR 0.77   *     CBC    Recent Labs  Lab 12/09/17 0358   WBC 14.5*   RBC 3.90   HGB 12.0   HCT 37.4   MCV 96   MCH 30.8   MCHC 32.1   RDW 12.9        INR    Recent Labs  Lab 12/09/17 0358   INR 1.08     LFTs    Recent Labs  Lab 12/09/17 0358   ALKPHOS 80   AST 21   ALT 42   BILITOTAL 0.5   PROTTOTAL 7.8   ALBUMIN 3.0*      PANC    Recent Labs  Lab 12/09/17  0358 "   LIPASE 189       Imaging:  CT c/a/p with IV contrast 12/9  Impression:   1. Findings consistent with known necrotizing pancreatitis. The  associated acute necrotic collection has decreased in size with  cystogastrostomy drains in position. Tiny foci of air within the  collection is likely postprocedural but may represent infection.  2. Unchanged mass in the pancreatic tail which which was shown to be  adenocarcinoma by FNA.  3. Scattered tiny pulmonary nodules measuring less than 3 mm.  4. Mildly prominent mesenteric lymph nodes are likely reactive.  5. Unchanged small hypodensities in the right hepatic lobe, one of  which is previously characterized as hemangioma and the other which is  too small to characterize on this study. Liver MRI with Eovist could  be considered for additional characterization.

## 2017-12-10 NOTE — PROGRESS NOTES
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      #Necrotizing pancreatitis   Hx of necrotizing pancreatitis s/p EUS w/ cystogastrostomy and FNA of panc mass on 11/29 and EGD necrosectomy w/ stent replacement on 12/4. Followed by Dr. Grajeda. CT abd today with decreasing size in necrotic collection. Per GI, collection was well drained prior. Concern for possible infection, as patient says her symptoms are similar to prior admission when she needed drainage. Mild leukocytosis. Spiked fever to 101.4 on 12/10, GI planing for repeat necrosectomy on 12/11.  - GI consulted, appreciate recs  - Due to fever, plan for necrosectomy in AM  - IV zosyn for possible infection  - 250ml/Hr LR  - MRI abd with EOVIST complete, read pending  - Will monitor, plan for procedure Monday if she is not doing better/looks more infected  - Creon and low fat diet   - Pain control with tylenol, will increase if needed but adequately controled     #Likely Mucinous Cystadenocarcinoma  Tissue showing adenocarcinoma. More likely mucinous cystadenocarcinoma rather than pancreatic adenocarcinoma. Pt states she is in discussions about resection and treatment options. CT with nodule in the lungs, possibly concerning for progression of disease.            FEN: Low fat diet, NPO at midnight  Lines: PIV  DVT: Mechanical  Code status: Full  Dispo: Antibiotics and monitoring for improvement with possible procedural intervention on Monday      Clinical decision making discussed with Dr. Kelly Cowan MD  Internal Medicine  "PGY1  [phone]      ___________________________________________________________________    Subjective & Interval History:   NAEON. Febrile this AM but HR and BP wnl. States she overall feels better than yesterday although she is very hot. Notes abdominal pain has decreased and her appetite is back. She denies CP/SOB, N/V, dizziness.   Last 24 hr care team notes reviewed.     Medications: Reviewed in EPIC. List below for reference    Physical Exam:    Blood pressure 93/57, pulse 80, temperature 99.6  F (37.6  C), resp. rate 16, height 1.651 m (5' 5\"), weight 80.8 kg (178 lb 2.1 oz), SpO2 93 %.    Exam:  General: the patient is pleasant, resting comfortably, no acute distress.  HEENT: Normocephalic, atraumatic. MMM  Lungs: Clear to auscultation, no wheezes or crackles.   CV: RRR, nl s1 and s2, no m/r/g.   Abdomen: Soft, nondistended,tender in RUQ and epigastric region, improved from yesterday  No rebound or guarding. Bowel sounds active.  Extremities: No lower extremity edema. Peripheral pulses strong and symmetric. ROM full  Skin: no appreciable skin lesions/rashes   Neuro: Alert and oriented x4, Strength 5/5   Psych: normal affect, answering questions appropriately. No obvious depression or anxiety.      Lines/Tubes:   Peripheral IV 12/09/17 Right Upper arm (Active)   Site Assessment WDL 12/10/2017  8:00 AM   Line Status Saline locked 12/10/2017  8:00 AM   Phlebitis Scale 0-->no symptoms 12/10/2017  8:00 AM   Infiltration Scale 0 12/10/2017  8:00 AM   Extravasation? No 12/10/2017  8:00 AM   Number of days:1       Labs & Studies of Note: Reviewed in Epic  CMP  Recent Labs  Lab 12/10/17  0559 12/09/17  0358    138   POTASSIUM 4.4 4.2   CHLORIDE 110* 105   CO2 28 26   ANIONGAP 5 6   * 137*   BUN 6* 11   CR 0.87 0.77   GFRESTIMATED 67 76   GFRESTBLACK 81 >90   ILIANA 8.7 9.0   PROTTOTAL  --  7.8   ALBUMIN  --  3.0*   BILITOTAL  --  0.5   ALKPHOS  --  80   AST  --  21   ALT  --  42       CBC  Recent " Labs  Lab 12/10/17  0559 12/09/17  0358   WBC 12.7* 14.5*   RBC 3.53* 3.90   HGB 10.6* 12.0   HCT 34.2* 37.4    336       INR  Recent Labs  Lab 12/09/17  0358   INR 1.08       Unresulted Labs Ordered in the Past 30 Days of this Admission     Date and Time Order Name Status Description    12/10/2017 0933 Blood culture In process     12/10/2017 0933 Blood culture In process           Medication List for Reference (delete if desired)  Current Facility-Administered Medications   Medication     naloxone (NARCAN) injection 0.1-0.4 mg     lactated ringers infusion     influenza quadrivalent (PF) vacc age 3 yrs and older (FLUZONE or Flulaval) injection 0.5 mL     amylase-lipase-protease (CREON 24) 89404-25639 UNITS per EC capsule 24,000-48,000 Units     amylase-lipase-protease (CREON 24) 25728-04199 UNITS per EC capsule 48,000-72,000 Units     acetaminophen (TYLENOL) tablet 650 mg     piperacillin-tazobactam (ZOSYN) 3.375 in 15 mL NS Premix Syringe

## 2017-12-10 NOTE — PLAN OF CARE
Problem: Patient Care Overview  Goal: Plan of Care/Patient Progress Review  Outcome: No Change  VSS except for low BPs. A&O x4,  at bedside throughout evening. Independent and pt ambulated in halls. LR running at 250mL/hr in R PIV. Takes enzymes with meals and snacks. On a low fat diet. Gave scheduled Zosyn IVP. Pt had CT today. Has an MRI scheduled for tomorrow so pt is NPO at midnight. MRI checklist done. Tylenol given for headache.

## 2017-12-10 NOTE — PLAN OF CARE
"Problem: Patient Care Overview  Goal: Plan of Care/Patient Progress Review  Outcome: Improving  BP 93/57 (BP Location: Left arm)  Pulse 80  Temp 99.6  F (37.6  C)  Resp 16  Ht 1.651 m (5' 5\")  Wt 80.8 kg (178 lb 2.1 oz)  SpO2 93%  BMI 29.64 kg/m2    Pt A and O. Slight fever this AM, provider made aware and tylenol admin. Temp came down after tylenol, most recently was 99.6 F. Lung sounds clear on RA. HR and pulses WDL. Bowel sounds active. Abd pain with and without palpation (epigastric--sharp and intermittent). 1 BM this shift. Pt up ind. Adequate UOP. Pt NPO most of shift due to scheduled MRI, which was around 1200. Fluids DC'd early this AM. PIV in RUE saline locked, site WDL. Pt restarted on low-fat diet after she returned from MRI. Spouse at bedside, appropriate and attentive. Pt to be NPO at midnight tonight; plan for OR tomorrow for necrosectomy.         "

## 2017-12-11 LAB
ERYTHROCYTE [DISTWIDTH] IN BLOOD BY AUTOMATED COUNT: 13.3 % (ref 10–15)
HCT VFR BLD AUTO: 32.7 % (ref 35–47)
HGB BLD-MCNC: 10 G/DL (ref 11.7–15.7)
INR PPP: 1.13 (ref 0.86–1.14)
MCH RBC QN AUTO: 30 PG (ref 26.5–33)
MCHC RBC AUTO-ENTMCNC: 30.6 G/DL (ref 31.5–36.5)
MCV RBC AUTO: 98 FL (ref 78–100)
PLATELET # BLD AUTO: 246 10E9/L (ref 150–450)
RBC # BLD AUTO: 3.33 10E12/L (ref 3.8–5.2)
WBC # BLD AUTO: 13 10E9/L (ref 4–11)

## 2017-12-11 PROCEDURE — 36415 COLL VENOUS BLD VENIPUNCTURE: CPT | Performed by: STUDENT IN AN ORGANIZED HEALTH CARE EDUCATION/TRAINING PROGRAM

## 2017-12-11 PROCEDURE — 25000132 ZZH RX MED GY IP 250 OP 250 PS 637: Performed by: STUDENT IN AN ORGANIZED HEALTH CARE EDUCATION/TRAINING PROGRAM

## 2017-12-11 PROCEDURE — 25000128 H RX IP 250 OP 636: Performed by: STUDENT IN AN ORGANIZED HEALTH CARE EDUCATION/TRAINING PROGRAM

## 2017-12-11 PROCEDURE — 85610 PROTHROMBIN TIME: CPT | Performed by: PHYSICIAN ASSISTANT

## 2017-12-11 PROCEDURE — 12000001 ZZH R&B MED SURG/OB UMMC

## 2017-12-11 PROCEDURE — 99233 SBSQ HOSP IP/OBS HIGH 50: CPT | Mod: GC | Performed by: INTERNAL MEDICINE

## 2017-12-11 PROCEDURE — 85027 COMPLETE CBC AUTOMATED: CPT | Performed by: STUDENT IN AN ORGANIZED HEALTH CARE EDUCATION/TRAINING PROGRAM

## 2017-12-11 RX ADMIN — SODIUM CHLORIDE, POTASSIUM CHLORIDE, SODIUM LACTATE AND CALCIUM CHLORIDE: 600; 310; 30; 20 INJECTION, SOLUTION INTRAVENOUS at 12:08

## 2017-12-11 RX ADMIN — PIPERACILLIN SODIUM AND TAZOBACTAM SODIUM 3.38 G: 36; 4.5 INJECTION, POWDER, LYOPHILIZED, FOR SOLUTION INTRAVENOUS at 06:18

## 2017-12-11 RX ADMIN — PIPERACILLIN SODIUM AND TAZOBACTAM SODIUM 3.38 G: 36; 4.5 INJECTION, POWDER, LYOPHILIZED, FOR SOLUTION INTRAVENOUS at 18:49

## 2017-12-11 RX ADMIN — ACETAMINOPHEN 650 MG: 325 TABLET, FILM COATED ORAL at 06:30

## 2017-12-11 RX ADMIN — SODIUM CHLORIDE, POTASSIUM CHLORIDE, SODIUM LACTATE AND CALCIUM CHLORIDE: 600; 310; 30; 20 INJECTION, SOLUTION INTRAVENOUS at 07:20

## 2017-12-11 RX ADMIN — SODIUM CHLORIDE, POTASSIUM CHLORIDE, SODIUM LACTATE AND CALCIUM CHLORIDE: 600; 310; 30; 20 INJECTION, SOLUTION INTRAVENOUS at 03:07

## 2017-12-11 RX ADMIN — PIPERACILLIN SODIUM AND TAZOBACTAM SODIUM 3.38 G: 36; 4.5 INJECTION, POWDER, LYOPHILIZED, FOR SOLUTION INTRAVENOUS at 00:35

## 2017-12-11 RX ADMIN — PIPERACILLIN SODIUM AND TAZOBACTAM SODIUM 3.38 G: 36; 4.5 INJECTION, POWDER, LYOPHILIZED, FOR SOLUTION INTRAVENOUS at 12:08

## 2017-12-11 RX ADMIN — SODIUM CHLORIDE, POTASSIUM CHLORIDE, SODIUM LACTATE AND CALCIUM CHLORIDE: 600; 310; 30; 20 INJECTION, SOLUTION INTRAVENOUS at 16:05

## 2017-12-11 RX ADMIN — ACETAMINOPHEN 650 MG: 325 TABLET, FILM COATED ORAL at 17:25

## 2017-12-11 NOTE — PROGRESS NOTES
Cross Cover    12/11/17  5:20 PM    Called RN at 1700 to see if patient had left for procedure yet. RN was concerned that the procedure had been canceled according to the computer.   Paged fellow Dr. Bonds who was unaware of any changes to the plan. [facility] so she recommended I page the NP (Ewa). I paged her and there was no response.  Paged Dr. Bloom with no reply.  Paged fellow Dr. Christian (on ERCP service) who said he would look into this issue.   Dr. Christian called back and stated the case was canceled and could be a possible add on tomorrow or Wednesday.   Patient is interested in discharging given that she waited all day and won't be able to guarantee a procedure until Thursday 12/14.   Paged Dr. Bonds regarding her possible discharge and Dr. Bonds planned to call back with a possible plan.     Dr. Bloom called back and agreed to come see the patient. He and fellow Dr. Krishnan were at the bedside around 6:15 PM.    Discharge orders prepared in the event that patient requests discharge.     Discussed with bedside RN and Dr. Mendoza.    Kristy Ba MD  Internal Medicine-Pediatrics Resident, PGY4  [phone]

## 2017-12-11 NOTE — PLAN OF CARE
Problem: Patient Care Overview  Goal: Plan of Care/Patient Progress Review  Outcome: No Change  Patient alert and oriented x 4, ambulatory and independent with ADLs. No c/o pain or discomfort and no respiratory issues noted. With ongoing IVF LR at 150 ml/hr via PIV. Was seen and rounded by Primary team with no orders placed this AM but to continue with NPO for OR procedure. Ice chips for comfort, took a shower. Resting as of this time. Awaiting for Necrosectomy in OR today. No definite time yet.  at bedside attentive to cares.

## 2017-12-11 NOTE — PROGRESS NOTES
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      Necrotizing pancreatitis   Hx of necrotizing pancreatitis s/p EUS w/ cystogastrostomy and FNA of panc mass on 11/29 and EGD necrosectomy w/ stent replacement on 12/4. Followed by Dr. Metz. CT abd today with decreasing size in necrotic collection. Per GI, collection was well drained prior. Concern for possible infection, as patient says her symptoms are similar to prior admission when she needed drainage. Mild leukocytosis. Spiked fever to 101.4 on 12/10, 12/11  - GI consulted, appreciate recs  - Going to OR today  - Continue IV zosyn for possible infection  - 250ml/Hr LR  - Creon and low fat diet   - Pain control with tylenol, will increase if needed but adequately controled     #Likely Mucinous Cystadenocarcinoma  Tissue showing adenocarcinoma. More likely mucinous cystadenocarcinoma rather than pancreatic adenocarcinoma. Pt states she is in discussions about resection and treatment options. CT with nodule in the lungs, possibly concerning for progression of disease.   -Has surgical oncology follow-up planned as outpatient      FEN: NPO with maintenance fluids  Lines: PIV  DVT: Mechanical  Code status: Full  Dispo: pending, likely discharge later this week if procedure goes well.     Clinical decision making discussed with Dr. Kelly Ba MD  Internal Medicine-Pediatrics Resident, PGY4  [phone]    ___________________________________________________________________    Subjective & Interval History:   Did have some fevers overnight as well. Felt better with ice packs. Abdominal pain was manageable. She is awaiting the procedure  "later today.   Blood pressures at baseline. Lactate has been normal    4 point review of systems otherwise negative.     Medications: Reviewed in EPIC. List below for reference    Physical Exam:    Blood pressure 103/60, pulse 73, temperature 98.1  F (36.7  C), temperature source Oral, resp. rate 16, height 1.651 m (5' 5\"), weight 80.6 kg (177 lb 9.6 oz), SpO2 94 %.    Exam:  General: the patient is pleasant, resting comfortably, no acute distress. Smiling at times.  HEENT: Normocephalic, atraumatic. MMM  Respiratory: Clear to auscultation, no wheezes or crackles.   CV: RRR, nl s1 and s2, no m/r/g.   Abdominal: Soft, nondistended. Mildly tender in the epigastric region. No rebound or guarding. Bowel sounds active.  Musculoskeletal: No lower extremity edema. Peripheral pulses strong and symmetric. ROM full  Skin: no appreciable skin lesions/rashes   Neuro: Alert, awake, appropriate. No focal deficits  Psych: normal affect, answering questions appropriately. Bright affect.    Lines/Tubes:   Peripheral IV 12/09/17 Right Upper arm (Active)   Site Assessment WDL 12/10/2017  8:00 AM   Line Status Saline locked 12/10/2017  8:00 AM   Phlebitis Scale 0-->no symptoms 12/10/2017  8:00 AM   Infiltration Scale 0 12/10/2017  8:00 AM   Extravasation? No 12/10/2017  8:00 AM   Number of days:1       Labs & Studies of Note: Reviewed in Epic  CMP    Recent Labs  Lab 12/10/17  0559 12/09/17 0358    138   POTASSIUM 4.4 4.2   CHLORIDE 110* 105   CO2 28 26   ANIONGAP 5 6   * 137*   BUN 6* 11   CR 0.87 0.77   GFRESTIMATED 67 76   GFRESTBLACK 81 >90   ILIANA 8.7 9.0   PROTTOTAL  --  7.8   ALBUMIN  --  3.0*   BILITOTAL  --  0.5   ALKPHOS  --  80   AST  --  21   ALT  --  42       CBC    Recent Labs  Lab 12/11/17  0646 12/10/17  0559 12/09/17 0358   WBC 13.0* 12.7* 14.5*   RBC 3.33* 3.53* 3.90   HGB 10.0* 10.6* 12.0   HCT 32.7* 34.2* 37.4    283 336       INR    Recent Labs  Lab 12/09/17  0358   INR 1.08       Unresulted " Labs Ordered in the Past 30 Days of this Admission     Date and Time Order Name Status Description    12/10/2017 0933 Blood culture Preliminary     12/10/2017 0933 Blood culture Preliminary         Medication List for Reference  Current Facility-Administered Medications   Medication     lactated ringers infusion     naloxone (NARCAN) injection 0.1-0.4 mg     influenza quadrivalent (PF) vacc age 3 yrs and older (FLUZONE or Flulaval) injection 0.5 mL     amylase-lipase-protease (CREON 24) 30208-61043 UNITS per EC capsule 24,000-48,000 Units     amylase-lipase-protease (CREON 24) 27160-63565 UNITS per EC capsule 48,000-72,000 Units     acetaminophen (TYLENOL) tablet 650 mg     piperacillin-tazobactam (ZOSYN) 3.375 in 15 mL NS Premix Syringe

## 2017-12-11 NOTE — PROGRESS NOTES
"CLINICAL NUTRITION SERVICES - ASSESSMENT NOTE     Nutrition Prescription    RECOMMENDATIONS FOR MDs/PROVIDERS TO ORDER:  None today     Malnutrition Status:    Patient does not meet criteria necessary for diagnosing malnutrition    Future/Additional Recommendations:  If suspect intake inadequate, offer nutrition supplements/snacks to promote adequate PO intakes.    Consider checking fat soluble vitamins (A, D, E, K) and vitamin B12 given potential risk for deficiency in the setting of necrotizing pancreatitis.      REASON FOR ASSESSMENT  Kitty Bangura is a 57 year old female assessed by the dietitian for Admission Nutrition Risk Screen for unintentional loss of 10# or more in the past two months and reduced oral intake over the last month    NUTRITION HISTORY   Pt follows a low fat diet (< 3 g total fat/meal) at home. Pt reports appetite has been variable, fluctuating from eating only small bites at mealtimes to eating at baseline (overall, at least < 75% baseline intakes) over the past ~1 month. Approximately 1 week prior to admit, pt was tolerating only small bites at mealtimes.    She takes Creon 24, 2-3 tabs during meals and with snacks. She describes her stools as normal and denies steatorrhea.      CURRENT NUTRITION ORDERS  Diet: NPO for planned necrosectomy today  Intake/Tolerance: Previously, low fat diet. Appetite fair yesterday, ate 50% dinner of chicken salad, cottage cheese.      LABS  Labs reviewed    MEDICATIONS  Creon 24, 2-3 tabs TID with meals     ANTHROPOMETRICS  Height: 165.1 cm (5' 5\")  Most Recent Weight: 80.6 kg (177 lb 9.6 oz)    IBW: 56.8 kg  BMI: Overweight BMI 25-29.9  Weight History:  Per discussion with pt, her UBW is ~180 lbs. This suggests pt net down 3 lbs (2%) over the past 3 weeks.      Wt Readings from Last 10 Encounters:   12/10/17 80.6 kg (177 lb 9.6 oz)   12/04/17 78.3 kg (172 lb 9.9 oz)   11/29/17 80.1 kg (176 lb 9.4 oz)   11/27/17 81.1 kg (178 lb 14.4 oz)   11/22/17 81.1 kg " (178 lb 12.8 oz)   11/20/17 82.1 kg (181 lb 1.6 oz)   11/08/17 93.7 kg (206 lb 9.1 oz)       Dosing Weight: 63 kg (adjusted, based on actual weight of 80.6 kg on 12/10/17 and IBW of 56.8 kg)     ASSESSED NUTRITION NEEDS  Estimated Energy Needs: 1575 - 1890 kcals/day (25 - 30 kcals/kg)  Justification: Maintenance  Estimated Protein Needs: 76 - 95 grams protein/day (1.2 - 1.5 grams of pro/kg)  Justification: Increased needs   Estimated Fluid Needs: 1 mL/kcal   Justification: Maintenance    PHYSICAL FINDINGS  See malnutrition section below.    MALNUTRITION  % Intake: </=75% for >/= 1 month (severe)  % Weight Loss: Weight loss does not meet criteria  Subcutaneous Fat Loss: None observed  Muscle Loss: None observed  Fluid Accumulation/Edema: None noted  Malnutrition Diagnosis: Patient does not meet two of the above criteria necessary for diagnosing malnutrition    NUTRITION DIAGNOSIS  Inadequate oral intake related to variable appetite r/t intermittent GI sx (abdominal pain, nausea) as evidenced by overall eating < 75% baseline intakes over past month.      INTERVENTIONS  Implementation  Nutrition Education: Discussed low fat diet. Provided education regarding adequate PO intake in the setting of nausea/abdominal pain. Provided menu with total fat content of individual food items.     Goals  Patient to consume % of nutritionally adequate meal trays TID, or the equivalent with supplements/snacks.     Monitoring/Evaluation  Progress toward goals will be monitored and evaluated per protocol.    Ofe Ernst RD, LD  5A/5B: [phone]

## 2017-12-11 NOTE — PLAN OF CARE
Problem: Patient Care Overview  Goal: Plan of Care/Patient Progress Review  Outcome: No Change  Max temp 101.3, provider notified.  Tylenol and ice packs given. OVSS on RA.  Denies pain or nausea.  PIV saline locked.  Tolerating low fat diet.  Up independently to bathroom.  Voiding but not saving.  Continue plan of care.

## 2017-12-11 NOTE — PLAN OF CARE
Problem: Patient Care Overview  Goal: Plan of Care/Patient Progress Review  Outcome: No Change  Alert and oriented. VSS on ra; had a fever earlier in the night. Ice packs provided, tylenol administered, and restarted on IV fluids. Fever subsided following interventions. Up ad soni. Denies nausea/vomiting. Scheduled antibiotics administered. Denies pain. Plan for necrosectomy today. Slept through the night.   R: Continue with poc

## 2017-12-11 NOTE — PROGRESS NOTES
GASTROENTEROLOGY PROGRESS NOTE    ASSESSMENT:  56 yo F with a history of acute necrotizing pancreatitis and recently diagnosed adenocarcinoma of pancreatic tail presents to ED with fever and worsening left sided abdominal pain concerning for infected necrotizing pancreatitis. Initial presentation to [facility] 11/1/2017, etiology of pancreatitis unclear at the time (MRCP negative for stone, normal triglyceride and calcium levels. Had a MRI of back report from 4/2004 mentioned a 2.5 cm rounded focus of increased T2 signal in the region of pancreatic tail. 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 Had endoscopic necrosectomy on 12/4. Finished a 7 day course of Levoflaxicin as outpatient.     Repeated CT 12/9 showed stable necrotizing pancreatitis, with low suspicion for infections.  WBC 14.5. infectious work up include negative UA, and negative pneumonia on CT. Patient developed fever to 40.5 while on IV Zosyn, concerning for infected necrotizing pancreatitis.       RECOMMENDATIONS:  - keep NPO after midnight.   - Continue LR @ 250 cc/hr   - On OR schedule for necrosectomy with Dr. Metz on Monday   - Trend CBC  - Check INR, goal < 1.5 for procedure   - Hold all anticoagulation   - Continue IV Zosyn  - Recommend Contrast enhanced MRI with Eovist to follow non-specific liver lesions and assess collapse of necrosis cavity.     GI will continue to follow, please page if any questions.     The patient was discussed and plan agreed upon with GI staffs Dr. Bloom and Dr. Isaías Sawyer  GI Fellow  Pager   ______________________________________________________________  S: spike fever to 40.5 overnight while on Zosyn. This morning, feel better, abdominal pain improved     O:  Blood pressure 99/59, pulse 77,  "temperature 101.3  F (38.5  C), temperature source Oral, resp. rate 16, height 1.651 m (5' 5\"), weight 80.8 kg (178 lb 2.1 oz), SpO2 97 %.    Gen: no acute distress  HEENT: atraumatic, no sclera icterus   CV: RRR, no murmur   Lungs: CLA b/l, no wheezing or crackles   Abd: mild left sided tenderness. Soft, distended, normal active BS.   Skin: no jaundice   MS: no skeletal pain   Neuro: no asterixis, A&Ox3, no focal neurological deficit  Psych: normal mood      LABS:  BMP  Recent Labs  Lab 12/10/17  0559 12/09/17  0358    138   POTASSIUM 4.4 4.2   CHLORIDE 110* 105   ILIANA 8.7 9.0   CO2 28 26   BUN 6* 11   CR 0.87 0.77   * 137*     CBC  Recent Labs  Lab 12/10/17  0559 12/09/17 0358   WBC 12.7* 14.5*   RBC 3.53* 3.90   HGB 10.6* 12.0   HCT 34.2* 37.4   MCV 97 96   MCH 30.0 30.8   MCHC 31.0* 32.1   RDW 13.1 12.9    336     INR  Recent Labs  Lab 12/09/17  0358   INR 1.08     LFTs  Recent Labs  Lab 12/09/17 0358   ALKPHOS 80   AST 21   ALT 42   BILITOTAL 0.5   PROTTOTAL 7.8   ALBUMIN 3.0*      PANC  Recent Labs  Lab 12/09/17  0358   LIPASE 189             "

## 2017-12-12 ENCOUNTER — ANESTHESIA (OUTPATIENT)
Dept: SURGERY | Facility: CLINIC | Age: 57
DRG: 406 | End: 2017-12-12

## 2017-12-12 ENCOUNTER — ANESTHESIA EVENT (OUTPATIENT)
Dept: SURGERY | Facility: CLINIC | Age: 57
DRG: 406 | End: 2017-12-12

## 2017-12-12 ENCOUNTER — APPOINTMENT (OUTPATIENT)
Dept: GENERAL RADIOLOGY | Facility: CLINIC | Age: 57
DRG: 406 | End: 2017-12-12
Attending: INTERNAL MEDICINE

## 2017-12-12 LAB
ANION GAP SERPL CALCULATED.3IONS-SCNC: 11 MMOL/L (ref 3–14)
BUN SERPL-MCNC: 4 MG/DL (ref 7–30)
CALCIUM SERPL-MCNC: 8.7 MG/DL (ref 8.5–10.1)
CHLORIDE SERPL-SCNC: 104 MMOL/L (ref 94–109)
CO2 SERPL-SCNC: 24 MMOL/L (ref 20–32)
CREAT SERPL-MCNC: 0.66 MG/DL (ref 0.52–1.04)
ERYTHROCYTE [DISTWIDTH] IN BLOOD BY AUTOMATED COUNT: 13 % (ref 10–15)
GFR SERPL CREATININE-BSD FRML MDRD: >90 ML/MIN/1.7M2
GLUCOSE SERPL-MCNC: 92 MG/DL (ref 70–99)
HCT VFR BLD AUTO: 31.2 % (ref 35–47)
HGB BLD-MCNC: 9.6 G/DL (ref 11.7–15.7)
INR PPP: 1.23 (ref 0.86–1.14)
MCH RBC QN AUTO: 29.7 PG (ref 26.5–33)
MCHC RBC AUTO-ENTMCNC: 30.8 G/DL (ref 31.5–36.5)
MCV RBC AUTO: 97 FL (ref 78–100)
PLATELET # BLD AUTO: 251 10E9/L (ref 150–450)
POTASSIUM SERPL-SCNC: 3.6 MMOL/L (ref 3.4–5.3)
RBC # BLD AUTO: 3.23 10E12/L (ref 3.8–5.2)
SODIUM SERPL-SCNC: 139 MMOL/L (ref 133–144)
WBC # BLD AUTO: 11.5 10E9/L (ref 4–11)

## 2017-12-12 PROCEDURE — 0FBG8ZZ EXCISION OF PANCREAS, VIA NATURAL OR ARTIFICIAL OPENING ENDOSCOPIC: ICD-10-PCS | Performed by: INTERNAL MEDICINE

## 2017-12-12 PROCEDURE — 71000014 ZZH RECOVERY PHASE 1 LEVEL 2 FIRST HR: Performed by: INTERNAL MEDICINE

## 2017-12-12 PROCEDURE — C9399 UNCLASSIFIED DRUGS OR BIOLOG: HCPCS | Performed by: NURSE ANESTHETIST, CERTIFIED REGISTERED

## 2017-12-12 PROCEDURE — 25000128 H RX IP 250 OP 636: Performed by: INTERNAL MEDICINE

## 2017-12-12 PROCEDURE — 25500064 ZZH RX 255 OP 636: Performed by: INTERNAL MEDICINE

## 2017-12-12 PROCEDURE — C1876 STENT, NON-COA/NON-COV W/DEL: HCPCS | Performed by: INTERNAL MEDICINE

## 2017-12-12 PROCEDURE — 25000125 ZZHC RX 250: Performed by: NURSE ANESTHETIST, CERTIFIED REGISTERED

## 2017-12-12 PROCEDURE — 25000128 H RX IP 250 OP 636: Performed by: STUDENT IN AN ORGANIZED HEALTH CARE EDUCATION/TRAINING PROGRAM

## 2017-12-12 PROCEDURE — 85027 COMPLETE CBC AUTOMATED: CPT | Performed by: INTERNAL MEDICINE

## 2017-12-12 PROCEDURE — 27210995 ZZH RX 272: Performed by: INTERNAL MEDICINE

## 2017-12-12 PROCEDURE — 85610 PROTHROMBIN TIME: CPT | Performed by: INTERNAL MEDICINE

## 2017-12-12 PROCEDURE — 36000059 ZZH SURGERY LEVEL 3 EA 15 ADDTL MIN UMMC: Performed by: INTERNAL MEDICINE

## 2017-12-12 PROCEDURE — 37000008 ZZH ANESTHESIA TECHNICAL FEE, 1ST 30 MIN: Performed by: INTERNAL MEDICINE

## 2017-12-12 PROCEDURE — 40000279 XR SURGERY CARM FLUORO GREATER THAN 5 MIN W STILLS: Mod: TC

## 2017-12-12 PROCEDURE — C1769 GUIDE WIRE: HCPCS | Performed by: INTERNAL MEDICINE

## 2017-12-12 PROCEDURE — 0F7D8DZ DILATION OF PANCREATIC DUCT WITH INTRALUMINAL DEVICE, VIA NATURAL OR ARTIFICIAL OPENING ENDOSCOPIC: ICD-10-PCS | Performed by: INTERNAL MEDICINE

## 2017-12-12 PROCEDURE — 25000125 ZZHC RX 250: Performed by: ANESTHESIOLOGY

## 2017-12-12 PROCEDURE — 36415 COLL VENOUS BLD VENIPUNCTURE: CPT | Performed by: INTERNAL MEDICINE

## 2017-12-12 PROCEDURE — 0FPD8DZ REMOVAL OF INTRALUMINAL DEVICE FROM PANCREATIC DUCT, VIA NATURAL OR ARTIFICIAL OPENING ENDOSCOPIC: ICD-10-PCS | Performed by: INTERNAL MEDICINE

## 2017-12-12 PROCEDURE — 36000061 ZZH SURGERY LEVEL 3 W FLUORO 1ST 30 MIN - UMMC: Performed by: INTERNAL MEDICINE

## 2017-12-12 PROCEDURE — 80048 BASIC METABOLIC PNL TOTAL CA: CPT | Performed by: INTERNAL MEDICINE

## 2017-12-12 PROCEDURE — 25000128 H RX IP 250 OP 636: Performed by: NURSE ANESTHETIST, CERTIFIED REGISTERED

## 2017-12-12 PROCEDURE — 12000008 ZZH R&B INTERMEDIATE UMMC

## 2017-12-12 PROCEDURE — 99232 SBSQ HOSP IP/OBS MODERATE 35: CPT | Mod: GC | Performed by: PEDIATRICS

## 2017-12-12 PROCEDURE — 37000009 ZZH ANESTHESIA TECHNICAL FEE, EACH ADDTL 15 MIN: Performed by: INTERNAL MEDICINE

## 2017-12-12 PROCEDURE — 27210794 ZZH OR GENERAL SUPPLY STERILE: Performed by: INTERNAL MEDICINE

## 2017-12-12 PROCEDURE — C1726 CATH, BAL DIL, NON-VASCULAR: HCPCS | Performed by: INTERNAL MEDICINE

## 2017-12-12 PROCEDURE — 25000125 ZZHC RX 250: Performed by: INTERNAL MEDICINE

## 2017-12-12 PROCEDURE — 25000566 ZZH SEVOFLURANE, EA 15 MIN: Performed by: INTERNAL MEDICINE

## 2017-12-12 PROCEDURE — 40000170 ZZH STATISTIC PRE-PROCEDURE ASSESSMENT II: Performed by: INTERNAL MEDICINE

## 2017-12-12 DEVICE — STENT SOLUS BILIARY 10FRX03CM DBL PIGTAIL W/INTRO G25670: Type: IMPLANTABLE DEVICE | Site: STOMACH | Status: FUNCTIONAL

## 2017-12-12 DEVICE — STENT SOLUS BILIARY 10FRX01CM DBL PIGTAIL W/INTRO G26829: Type: IMPLANTABLE DEVICE | Site: STOMACH | Status: FUNCTIONAL

## 2017-12-12 RX ORDER — FENTANYL CITRATE 50 UG/ML
25-50 INJECTION, SOLUTION INTRAMUSCULAR; INTRAVENOUS
Status: DISCONTINUED | OUTPATIENT
Start: 2017-12-12 | End: 2017-12-12 | Stop reason: HOSPADM

## 2017-12-12 RX ORDER — FLUMAZENIL 0.1 MG/ML
0.2 INJECTION, SOLUTION INTRAVENOUS
Status: CANCELLED | OUTPATIENT
Start: 2017-12-12 | End: 2017-12-13

## 2017-12-12 RX ORDER — NALOXONE HYDROCHLORIDE 0.4 MG/ML
.1-.4 INJECTION, SOLUTION INTRAMUSCULAR; INTRAVENOUS; SUBCUTANEOUS
Status: CANCELLED | OUTPATIENT
Start: 2017-12-12 | End: 2017-12-13

## 2017-12-12 RX ORDER — ONDANSETRON 4 MG/1
4 TABLET, ORALLY DISINTEGRATING ORAL EVERY 30 MIN PRN
Status: DISCONTINUED | OUTPATIENT
Start: 2017-12-12 | End: 2017-12-12 | Stop reason: HOSPADM

## 2017-12-12 RX ORDER — LEVOFLOXACIN 5 MG/ML
INJECTION, SOLUTION INTRAVENOUS PRN
Status: DISCONTINUED | OUTPATIENT
Start: 2017-12-12 | End: 2017-12-12

## 2017-12-12 RX ORDER — SCOLOPAMINE TRANSDERMAL SYSTEM 1 MG/1
1 PATCH, EXTENDED RELEASE TRANSDERMAL
Status: DISCONTINUED | OUTPATIENT
Start: 2017-12-12 | End: 2017-12-12 | Stop reason: HOSPADM

## 2017-12-12 RX ORDER — HYDROMORPHONE HYDROCHLORIDE 1 MG/ML
.3-.5 INJECTION, SOLUTION INTRAMUSCULAR; INTRAVENOUS; SUBCUTANEOUS EVERY 5 MIN PRN
Status: DISCONTINUED | OUTPATIENT
Start: 2017-12-12 | End: 2017-12-12 | Stop reason: HOSPADM

## 2017-12-12 RX ORDER — HYDRALAZINE HYDROCHLORIDE 20 MG/ML
2.5-5 INJECTION INTRAMUSCULAR; INTRAVENOUS EVERY 10 MIN PRN
Status: DISCONTINUED | OUTPATIENT
Start: 2017-12-12 | End: 2017-12-12 | Stop reason: HOSPADM

## 2017-12-12 RX ORDER — SODIUM CHLORIDE, SODIUM LACTATE, POTASSIUM CHLORIDE, CALCIUM CHLORIDE 600; 310; 30; 20 MG/100ML; MG/100ML; MG/100ML; MG/100ML
INJECTION, SOLUTION INTRAVENOUS CONTINUOUS
Status: DISCONTINUED | OUTPATIENT
Start: 2017-12-12 | End: 2017-12-12 | Stop reason: HOSPADM

## 2017-12-12 RX ORDER — FENTANYL CITRATE 50 UG/ML
INJECTION, SOLUTION INTRAMUSCULAR; INTRAVENOUS PRN
Status: DISCONTINUED | OUTPATIENT
Start: 2017-12-12 | End: 2017-12-12

## 2017-12-12 RX ORDER — ONDANSETRON 2 MG/ML
4 INJECTION INTRAMUSCULAR; INTRAVENOUS EVERY 30 MIN PRN
Status: DISCONTINUED | OUTPATIENT
Start: 2017-12-12 | End: 2017-12-12 | Stop reason: HOSPADM

## 2017-12-12 RX ORDER — LIDOCAINE HYDROCHLORIDE 20 MG/ML
INJECTION, SOLUTION INFILTRATION; PERINEURAL PRN
Status: DISCONTINUED | OUTPATIENT
Start: 2017-12-12 | End: 2017-12-12

## 2017-12-12 RX ORDER — DEXAMETHASONE SODIUM PHOSPHATE 4 MG/ML
INJECTION, SOLUTION INTRA-ARTICULAR; INTRALESIONAL; INTRAMUSCULAR; INTRAVENOUS; SOFT TISSUE PRN
Status: DISCONTINUED | OUTPATIENT
Start: 2017-12-12 | End: 2017-12-12

## 2017-12-12 RX ORDER — LABETALOL HYDROCHLORIDE 5 MG/ML
10 INJECTION, SOLUTION INTRAVENOUS
Status: DISCONTINUED | OUTPATIENT
Start: 2017-12-12 | End: 2017-12-12 | Stop reason: HOSPADM

## 2017-12-12 RX ORDER — ONDANSETRON 2 MG/ML
INJECTION INTRAMUSCULAR; INTRAVENOUS PRN
Status: DISCONTINUED | OUTPATIENT
Start: 2017-12-12 | End: 2017-12-12

## 2017-12-12 RX ORDER — IOPAMIDOL 510 MG/ML
INJECTION, SOLUTION INTRAVASCULAR PRN
Status: DISCONTINUED | OUTPATIENT
Start: 2017-12-12 | End: 2017-12-12 | Stop reason: HOSPADM

## 2017-12-12 RX ORDER — PROPOFOL 10 MG/ML
INJECTION, EMULSION INTRAVENOUS PRN
Status: DISCONTINUED | OUTPATIENT
Start: 2017-12-12 | End: 2017-12-12

## 2017-12-12 RX ADMIN — FENTANYL CITRATE 75 MCG: 50 INJECTION, SOLUTION INTRAMUSCULAR; INTRAVENOUS at 16:46

## 2017-12-12 RX ADMIN — PROPOFOL 150 MG: 10 INJECTION, EMULSION INTRAVENOUS at 16:46

## 2017-12-12 RX ADMIN — FENTANYL CITRATE 25 MCG: 50 INJECTION, SOLUTION INTRAMUSCULAR; INTRAVENOUS at 18:17

## 2017-12-12 RX ADMIN — SODIUM CHLORIDE, POTASSIUM CHLORIDE, SODIUM LACTATE AND CALCIUM CHLORIDE: 600; 310; 30; 20 INJECTION, SOLUTION INTRAVENOUS at 10:21

## 2017-12-12 RX ADMIN — SODIUM CHLORIDE, POTASSIUM CHLORIDE, SODIUM LACTATE AND CALCIUM CHLORIDE: 600; 310; 30; 20 INJECTION, SOLUTION INTRAVENOUS at 16:33

## 2017-12-12 RX ADMIN — PIPERACILLIN SODIUM AND TAZOBACTAM SODIUM 3.38 G: 36; 4.5 INJECTION, POWDER, LYOPHILIZED, FOR SOLUTION INTRAVENOUS at 11:35

## 2017-12-12 RX ADMIN — DEXAMETHASONE SODIUM PHOSPHATE 8 MG: 4 INJECTION, SOLUTION INTRA-ARTICULAR; INTRALESIONAL; INTRAMUSCULAR; INTRAVENOUS; SOFT TISSUE at 17:21

## 2017-12-12 RX ADMIN — SUGAMMADEX 200 MG: 100 INJECTION, SOLUTION INTRAVENOUS at 18:04

## 2017-12-12 RX ADMIN — PIPERACILLIN SODIUM AND TAZOBACTAM SODIUM 3.38 G: 36; 4.5 INJECTION, POWDER, LYOPHILIZED, FOR SOLUTION INTRAVENOUS at 21:35

## 2017-12-12 RX ADMIN — LEVOFLOXACIN 500 MG: 5 INJECTION, SOLUTION INTRAVENOUS at 16:59

## 2017-12-12 RX ADMIN — PHENYLEPHRINE HYDROCHLORIDE 200 MCG: 10 INJECTION, SOLUTION INTRAMUSCULAR; INTRAVENOUS; SUBCUTANEOUS at 18:01

## 2017-12-12 RX ADMIN — PIPERACILLIN SODIUM AND TAZOBACTAM SODIUM 3.38 G: 36; 4.5 INJECTION, POWDER, LYOPHILIZED, FOR SOLUTION INTRAVENOUS at 00:00

## 2017-12-12 RX ADMIN — MIDAZOLAM 2 MG: 1 INJECTION INTRAMUSCULAR; INTRAVENOUS at 16:33

## 2017-12-12 RX ADMIN — SCOPALAMINE 1 PATCH: 1 PATCH, EXTENDED RELEASE TRANSDERMAL at 16:18

## 2017-12-12 RX ADMIN — ONDANSETRON 4 MG: 2 INJECTION INTRAMUSCULAR; INTRAVENOUS at 17:53

## 2017-12-12 RX ADMIN — PIPERACILLIN SODIUM AND TAZOBACTAM SODIUM 3.38 G: 36; 4.5 INJECTION, POWDER, LYOPHILIZED, FOR SOLUTION INTRAVENOUS at 05:22

## 2017-12-12 RX ADMIN — SODIUM CHLORIDE, POTASSIUM CHLORIDE, SODIUM LACTATE AND CALCIUM CHLORIDE: 600; 310; 30; 20 INJECTION, SOLUTION INTRAVENOUS at 00:00

## 2017-12-12 RX ADMIN — PHENYLEPHRINE HYDROCHLORIDE 200 MCG: 10 INJECTION, SOLUTION INTRAMUSCULAR; INTRAVENOUS; SUBCUTANEOUS at 17:52

## 2017-12-12 RX ADMIN — ROCURONIUM BROMIDE 50 MG: 10 INJECTION INTRAVENOUS at 16:46

## 2017-12-12 NOTE — PLAN OF CARE
Problem: Pancreatitis, Acute/Chronic (Adult)  Goal: Signs and Symptoms of Listed Potential Problems Will be Absent, Minimized or Managed (Pancreatitis, Acute/Chronic)  Signs and symptoms of listed potential problems will be absent, minimized or managed by discharge/transition of care (reference Pancreatitis, Acute/Chronic (Adult) CPG).   Outcome: No Change  Patient's procedure was cancelled after she had been NPO all day. She did eat some cereal and fruit for supper. Still with a temp to 99.8 this shift. Only pain is a headache. Had tylenol for this. Has a dry cough and lungs are diminished. She is voiding well. She gets up to the restroom and chair independently. MD will try to schedule the necrosectomy for tomorrow and should know if it can be scheduled at 8 am. Patient is to be NPO at midnight and she is aware.

## 2017-12-12 NOTE — PLAN OF CARE
"Problem: Patient Care Overview  Goal: Plan of Care/Patient Progress Review  Outcome: Improving    /55 (BP Location: Left arm)  Pulse 75  Temp 99.1  F (37.3  C) (Oral)  Resp 16  Ht 1.651 m (5' 5\")  Wt 84 kg (185 lb 3.2 oz)  SpO2 96%  BMI 30.82 kg/m2    Pt A and O. Low grade fever present. Lung sounds clear on RA. HR and pulses WDL. Bowel sounds active. Abd pain only with palpation (lower quadrants only). Report of loose stools--due to abx? Pt up ind. Adequate UOP. Pt NPO. PIV in RUE infusing LR at 100/hr, site WDL. Spouse at bedside, appropriate and attentive. Plan for OR later today to address necrotic portions of pancreas?      "

## 2017-12-12 NOTE — ANESTHESIA PREPROCEDURE EVALUATION
Anesthesia Evaluation     . Pt has had prior anesthetic. Type: General    No history of anesthetic complications          ROS/MED HX    ENT/Pulmonary:  - neg pulmonary ROS     Neurologic:  - neg neurologic ROS     Cardiovascular:  - neg cardiovascular ROS       METS/Exercise Tolerance:  >4 METS   Hematologic:         Musculoskeletal:  - neg musculoskeletal ROS       GI/Hepatic: Comment: Necrotizing pancreatitis        Renal/Genitourinary:  - ROS Renal section negative       Endo:  - neg endo ROS       Psychiatric:         Infectious Disease:         Malignancy:         Other:                     Physical Exam  Normal systems: pulmonary and dental    Airway   Mallampati: I  TM distance: >3 FB  Neck ROM: full    Dental     Cardiovascular   Rhythm and rate: regular and normal      Pulmonary                     Anesthesia Plan      History & Physical Review  History and physical reviewed and following examination; no interval change.    ASA Status:  2 .    NPO Status:  > 8 hours    Plan for General and ETT with Intravenous induction. Maintenance will be Balanced.    PONV prophylaxis:  Ondansetron (or other 5HT-3), Dexamethasone or Solumedrol and Scopolamine patch       Postoperative Care  Postoperative pain management:  IV analgesics.      Consents  Anesthetic plan, risks, benefits and alternatives discussed with:  Patient.  Use of blood products discussed: No .   .                          .

## 2017-12-12 NOTE — PLAN OF CARE
Problem: Pancreatitis, Acute/Chronic (Adult)  Goal: Signs and Symptoms of Listed Potential Problems Will be Absent, Minimized or Managed (Pancreatitis, Acute/Chronic)  Signs and symptoms of listed potential problems will be absent, minimized or managed by discharge/transition of care (reference Pancreatitis, Acute/Chronic (Adult) CPG).   Outcome: No Change   12/12/17 0603   Pancreatitis, Acute/Chronic   Problems Assessed (Pancreatitis) all   Problems Present (Pancreatitis) malabsorption/maldigestion;nausea and vomiting       Comments: VS stable and afebrile. Denies pain. NPO since midnight for possible procedure this morning. C/o slight nausea this morning but denies intervention. Pt feels it is due to being NPO. RPIV infusing LR @ 100.

## 2017-12-12 NOTE — OR NURSING
Writer spoke with Dr. Staples (gastroenterology) in pre procedure. He wrote out consent form but writer updated him that we still need pre op orders entered. Dr. Staples states he will enter them.

## 2017-12-12 NOTE — PROGRESS NOTES
GASTROENTEROLOGY PROGRESS NOTE    Date of Admission: 12/9/2017  Reason for Admission: fever, abd pain      Assessment:   58 yo F with a history of acute necrotizing pancreatitis and recently diagnosed adenocarcinoma of pancreatic tail presents to ED with fever and worsening left sided abdominal pain concerning for infected necrotizing pancreatitis. Initial presentation to [facility] 11/1/2017, etiology of pancreatitis unclear at the time (MRCP negative for stone, normal triglyceride and calcium levels. Had a MRI of back report from 4/2004 mentioned a 2.5 cm rounded focus of increased T2 signal in the region of pancreatic tail.      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 Had endoscopic necrosectomy on 12/4. Finished a 7 day course of Levoflaxicin as outpatient and developed fever and abdominal pain the following day prompting presentation to the ED.     Repeated CT on admission showed stable necrotizing pancreatitis, with low suspicion for infectious process however spiked fever since admission. IV zosyn started. WBC elevated on admission, down-trending since starting abx. Planning for endoscopic necrosectomy today.     Recommendations  - Plan for endoscopic necrosectomy today   - Continue IV Zosyn for now  - NPO  - Follow up with medical/surgical oncology  - GI will continue to follow, please page if any questions.     The patient was discussed and plan agreed upon with GI staff, Dr Staples.      Ewa Christopher PA-C   [facility]  [facility]  Pager *[phone]  _______________________________________________________________      Subjective: Patient seen and examined at 0830. Patient reports continued R sided abd pain, no nausea or vomiting. No  "fevers.    ROS:   4 pt ROS negative unless noted in subjective.     Objective:  Blood pressure 111/71, pulse 74, temperature 99.1  F (37.3  C), resp. rate 16, height 1.651 m (5' 5\"), weight 84 kg (185 lb 3.2 oz), SpO2 97 %.  Gen: NAD  HEENT: No icterus  Resp: Clear bilaterally  CV: RRR  Abd: Soft, mild RUQ tenderness, ND. +BS  Ext: WWP  Skin: No jaundice      Date 12/12/17 0700 - 12/13/17 0659   Shift 1262-3478 3324-4630 7528-4535 24 Hour Total   I  N  T  A  K  E   P.O. 0   0    I.V. 100   100    Shift Total  (mL/kg) 100  (1.19)   100  (1.19)   O  U  T  P  U  T   Shift Total  (mL/kg)       Weight (kg) 84.01 84.01 84.01 84.01       LABS:  BMP  Recent Labs  Lab 12/12/17  0629 12/10/17  0559 12/09/17  0358    144 138   POTASSIUM 3.6 4.4 4.2   CHLORIDE 104 110* 105   ILIANA 8.7 8.7 9.0   CO2 24 28 26   BUN 4* 6* 11   CR 0.66 0.87 0.77   GLC 92 100* 137*     CBC  Recent Labs  Lab 12/12/17  0629 12/11/17  0646 12/10/17  0559 12/09/17  0358   WBC 11.5* 13.0* 12.7* 14.5*   RBC 3.23* 3.33* 3.53* 3.90   HGB 9.6* 10.0* 10.6* 12.0   HCT 31.2* 32.7* 34.2* 37.4   MCV 97 98 97 96   MCH 29.7 30.0 30.0 30.8   MCHC 30.8* 30.6* 31.0* 32.1   RDW 13.0 13.3 13.1 12.9    246 283 336     INR  Recent Labs  Lab 12/12/17  0629 12/11/17  1641 12/09/17  0358   INR 1.23* 1.13 1.08     LFTs  Recent Labs  Lab 12/09/17  0358   ALKPHOS 80   AST 21   ALT 42   BILITOTAL 0.5   PROTTOTAL 7.8   ALBUMIN 3.0*      PANC  Recent Labs  Lab 12/09/17  0358   LIPASE 189         IMAGING:  Exam: CT of the chest, abdomen, and pelvis with contrast 12/9/2017  5:19 AM      History: Suspected malignancy      Comparison: CT 11/10/2017, 11/5/2017.     Technique: Helical acquisition from the thoracic inlet to the pubic  symphysis with the administration of IV.  Axial and coronal  reconstructions were viewed in bone, soft tissue, and lung windows.      Findings:      Chest: Lungs are relatively clear. The heart and great vessels are  normal in appearance.  There " is no mediastinal or axillary  lymphadenopathy.  No pleural or pericardial effusion. Normal  appearance of the visualized thyroid gland.      Scattered tiny pulmonary nodules seen on series 4, without comparison:  2 mm, right lower lobe, image 81  2 mm, lingula, image 89  3 mm, left upper lobe, image 70, adjacent to mediastinum  2 mm, right upper lobe, image 55  2 mm, right upper lobe, image 57  Other small scattered pulmonary nodules are also visualized     Abdomen/pelvis:   Findings consistent with known necrotizing pancreatitis, with multiple  surrounding fluid collection with rim enhancement. The largest  collection adjacent and anterior to the pancreatic head has decreased  in size no measuring up to 8.3 x 4.4 x 7.0 cm. Gastric drainage  catheters in place. Air within the collection is likely from placement  of the drain. No definite loculated fluid component, there is  significant soft tissue thickening and fat stranding surrounding this  area. A mass in the pancreatic tail up to 3.2 x 2.8 x 2.6 cm. It is  largely unchanged in size. There is both solid and cystic components  with calcified rim. Significant fat stranding and reactive enlarged  peripancreatic lymph nodes.  Tiny hypodense lesion of the right kidney to small to characterize,  unchanged. Otherwise unremarkable appearance of the kidneys.. 2  hypodense lesions in the right hepatic lobe are again seen, largest  measuring 1 cm and previously characterized as a hemangioma (series 3  image 290). Smaller lesion on series 3 image 261 measures 7 mm and  there is difficult to characterize due to small size. Unchanged area  wedge-shaped relative hypodensity in the right upper lobe. The  gallbladder, spleen, adrenals, are normal in appearance.       There are no dilated loops of small or large bowel. There is no bowel  wall thickening.  Mild reactive fat stranding of the right colon. The  appendix and terminal ileum are normal in appearance. No  free  intraperitoneal air or fluid.  The urinary bladder is within normal  limits.     The aorta and IVC are normal in caliber.  The splenic vein and portal  veins are patent.       Mild degenerative findings of the spine. There are no aggressive  appearing bone lesions.         Impression:   1. Findings consistent with known necrotizing pancreatitis. The  associated acute necrotic collection has decreased in size with  cystogastrostomy drains in position. Tiny foci of air within the  collection is likely postprocedural but may represent infection.  2. Unchanged mass in the pancreatic tail which which was shown to be  adenocarcinoma by FNA.  3. Scattered tiny pulmonary nodules measuring less than 3 mm.  4. Mildly prominent mesenteric lymph nodes are likely reactive.  5. Unchanged small hypodensities in the right hepatic lobe, one of  which is previously characterized as hemangioma and the other which is  too small to characterize on this study. Liver MRI with Eovist could  be considered for additional characterization.

## 2017-12-13 ENCOUNTER — TELEPHONE (OUTPATIENT)
Dept: ONCOLOGY | Facility: CLINIC | Age: 57
End: 2017-12-13

## 2017-12-13 VITALS
OXYGEN SATURATION: 97 % | RESPIRATION RATE: 18 BRPM | SYSTOLIC BLOOD PRESSURE: 88 MMHG | BODY MASS INDEX: 30.02 KG/M2 | DIASTOLIC BLOOD PRESSURE: 51 MMHG | WEIGHT: 180.2 LBS | TEMPERATURE: 96.7 F | HEART RATE: 58 BPM | HEIGHT: 65 IN

## 2017-12-13 DIAGNOSIS — K85.91 NECROTIZING PANCREATITIS: Primary | ICD-10-CM

## 2017-12-13 LAB
ALBUMIN SERPL-MCNC: 2.5 G/DL (ref 3.4–5)
ALP SERPL-CCNC: 75 U/L (ref 40–150)
ALT SERPL W P-5'-P-CCNC: 30 U/L (ref 0–50)
ANION GAP SERPL CALCULATED.3IONS-SCNC: 9 MMOL/L (ref 3–14)
AST SERPL W P-5'-P-CCNC: 16 U/L (ref 0–45)
BILIRUB SERPL-MCNC: 0.4 MG/DL (ref 0.2–1.3)
BUN SERPL-MCNC: 7 MG/DL (ref 7–30)
CALCIUM SERPL-MCNC: 8.8 MG/DL (ref 8.5–10.1)
CHLORIDE SERPL-SCNC: 107 MMOL/L (ref 94–109)
CO2 SERPL-SCNC: 26 MMOL/L (ref 20–32)
CREAT SERPL-MCNC: 0.65 MG/DL (ref 0.52–1.04)
ERYTHROCYTE [DISTWIDTH] IN BLOOD BY AUTOMATED COUNT: 13.1 % (ref 10–15)
GFR SERPL CREATININE-BSD FRML MDRD: >90 ML/MIN/1.7M2
GLUCOSE SERPL-MCNC: 120 MG/DL (ref 70–99)
HCT VFR BLD AUTO: 33.7 % (ref 35–47)
HGB BLD-MCNC: 10.5 G/DL (ref 11.7–15.7)
MCH RBC QN AUTO: 30.1 PG (ref 26.5–33)
MCHC RBC AUTO-ENTMCNC: 31.2 G/DL (ref 31.5–36.5)
MCV RBC AUTO: 97 FL (ref 78–100)
PLATELET # BLD AUTO: 284 10E9/L (ref 150–450)
POTASSIUM SERPL-SCNC: 4.2 MMOL/L (ref 3.4–5.3)
PROT SERPL-MCNC: 7.1 G/DL (ref 6.8–8.8)
RBC # BLD AUTO: 3.49 10E12/L (ref 3.8–5.2)
SODIUM SERPL-SCNC: 142 MMOL/L (ref 133–144)
UPPER GI ENDOSCOPY: NORMAL
WBC # BLD AUTO: 9.3 10E9/L (ref 4–11)

## 2017-12-13 PROCEDURE — 90686 IIV4 VACC NO PRSV 0.5 ML IM: CPT | Performed by: INTERNAL MEDICINE

## 2017-12-13 PROCEDURE — 36415 COLL VENOUS BLD VENIPUNCTURE: CPT | Performed by: INTERNAL MEDICINE

## 2017-12-13 PROCEDURE — 85027 COMPLETE CBC AUTOMATED: CPT | Performed by: INTERNAL MEDICINE

## 2017-12-13 PROCEDURE — 99238 HOSP IP/OBS DSCHRG MGMT 30/<: CPT | Mod: GC | Performed by: PEDIATRICS

## 2017-12-13 PROCEDURE — 80053 COMPREHEN METABOLIC PANEL: CPT | Performed by: INTERNAL MEDICINE

## 2017-12-13 PROCEDURE — 25000128 H RX IP 250 OP 636: Performed by: STUDENT IN AN ORGANIZED HEALTH CARE EDUCATION/TRAINING PROGRAM

## 2017-12-13 PROCEDURE — 25000128 H RX IP 250 OP 636: Performed by: INTERNAL MEDICINE

## 2017-12-13 RX ORDER — METRONIDAZOLE 500 MG/1
500 TABLET ORAL 2 TIMES DAILY
Qty: 20 TABLET | Refills: 0 | Status: SHIPPED | OUTPATIENT
Start: 2017-12-13 | End: 2018-01-08

## 2017-12-13 RX ORDER — CIPROFLOXACIN 500 MG/1
500 TABLET, FILM COATED ORAL 2 TIMES DAILY
Qty: 20 TABLET | Refills: 0 | Status: SHIPPED | OUTPATIENT
Start: 2017-12-13 | End: 2018-01-08

## 2017-12-13 RX ADMIN — PANCRELIPASE 72000 UNITS: 24000; 76000; 120000 CAPSULE, DELAYED RELEASE PELLETS ORAL at 08:12

## 2017-12-13 RX ADMIN — PIPERACILLIN SODIUM AND TAZOBACTAM SODIUM 3.38 G: 36; 4.5 INJECTION, POWDER, LYOPHILIZED, FOR SOLUTION INTRAVENOUS at 04:40

## 2017-12-13 RX ADMIN — PANCRELIPASE 72000 UNITS: 24000; 76000; 120000 CAPSULE, DELAYED RELEASE PELLETS ORAL at 12:54

## 2017-12-13 RX ADMIN — PIPERACILLIN SODIUM AND TAZOBACTAM SODIUM 3.38 G: 36; 4.5 INJECTION, POWDER, LYOPHILIZED, FOR SOLUTION INTRAVENOUS at 11:06

## 2017-12-13 RX ADMIN — INFLUENZA A VIRUS A/MICHIGAN/45/2015 X-275 (H1N1) ANTIGEN (FORMALDEHYDE INACTIVATED), INFLUENZA A VIRUS A/HONG KONG/4801/2014 X-263B (H3N2) ANTIGEN (FORMALDEHYDE INACTIVATED), INFLUENZA B VIRUS B/PHUKET/3073/2013 ANTIGEN (FORMALDEHYDE INACTIVATED), AND INFLUENZA B VIRUS B/BRISBANE/60/2008 ANTIGEN (FORMALDEHYDE INACTIVATED) 0.5 ML: 15; 15; 15; 15 INJECTION, SUSPENSION INTRAMUSCULAR at 11:06

## 2017-12-13 NOTE — PLAN OF CARE
Problem: Patient Care Overview  Goal: Plan of Care/Patient Progress Review  Outcome: No Change  AOx4.  VSS on capno.  Up ad soni and steady on feet.  Ambulated to bathroom.  Necrosectomy yesterday.  Denies pain/ nausea/ vomiting.  Pt able to make needs known.  Pt slept between cares and assessments.  Continue per POC.

## 2017-12-13 NOTE — PROGRESS NOTES
GASTROENTEROLOGY PROGRESS NOTE    Date of Admission: 12/9/2017  Reason for Admission: fever, abd pain      Assessment:   58 yo F with a history of acute necrotizing pancreatitis and recently diagnosed adenocarcinoma of pancreatic tail presents to ED with fever and worsening left sided abdominal pain concerning for infected necrotizing pancreatitis. Initial presentation to [facility] 11/1/2017, etiology of pancreatitis unclear at the time (MRCP negative for stone, normal triglyceride and calcium levels. Had a MRI of back report from 4/2004 mentioned a 2.5 cm rounded focus of increased T2 signal in the region of pancreatic tail.      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 Had endoscopic necrosectomy on 12/4. Finished a 7 day course of Levoflaxicin as outpatient and developed fever and abdominal pain the following day prompting presentation to the ED.     Repeated CT on admission showed stable necrotizing pancreatitis, with low suspicion for infectious process however spiked fever since admission. IV zosyn started. WBC elevated on admission, down-trending since starting abx. S/p repeat endoscopic necrosectomy 12/12 with copious amounts of pus and necrotic debris in cavity, which was completely removed and 2 new DPT Solus stents were placed. Patient without abdominal pain this morning, tolerating general diet.     Recommendations  - ADAT  - Continue antibiotics for total of 10 days (cipro/flagyl okay)  - Plan for repeat CT scan abd/pelv in 2 weeks (to be reviewed by Dr. Metz)  - Follow up with medical/surgical oncology as scheduled    Discussed with primary team, Dr. Monsivais    The patient was discussed and plan agreed upon with GI staff, Dr Staples.      Ewa Christopher PA-C   Advanced  "Endoscopy/Pancreaticobiliary [facility]  [facility]  Pager *[phone]  _______________________________________________________________      Subjective: Patient seen and examined at 1100. Patient reports that she feels great, without abdominal pain. Tolerating regular diet. No nausea, vomiting or fevers. Hoping to go home today.    ROS:   4 pt ROS negative unless noted in subjective.     Objective:  Blood pressure 93/54, pulse 58, temperature 97  F (36.1  C), resp. rate 18, height 1.651 m (5' 5\"), weight 81.7 kg (180 lb 3.2 oz), SpO2 92 %.  Gen: NAD  HEENT: No icterus  Ext: WWP  Skin: No jaundice      Date 12/12/17 0700 - 12/13/17 0659   Shift 1588-2794 9898-4095 0690-3350 24 Hour Total   I  N  T  A  K  E   P.O. 0   0    I.V. 100   100    Shift Total  (mL/kg) 100  (1.19)   100  (1.19)   O  U  T  P  U  T   Shift Total  (mL/kg)       Weight (kg) 84.01 84.01 84.01 84.01       LABS:  BMP    Recent Labs  Lab 12/13/17  0639 12/12/17  0629 12/10/17  0559 12/09/17  0358    139 144 138   POTASSIUM 4.2 3.6 4.4 4.2   CHLORIDE 107 104 110* 105   ILIANA 8.8 8.7 8.7 9.0   CO2 26 24 28 26   BUN 7 4* 6* 11   CR 0.65 0.66 0.87 0.77   * 92 100* 137*     CBC    Recent Labs  Lab 12/13/17  0639 12/12/17  0629 12/11/17  0646 12/10/17  0559   WBC 9.3 11.5* 13.0* 12.7*   RBC 3.49* 3.23* 3.33* 3.53*   HGB 10.5* 9.6* 10.0* 10.6*   HCT 33.7* 31.2* 32.7* 34.2*   MCV 97 97 98 97   MCH 30.1 29.7 30.0 30.0   MCHC 31.2* 30.8* 30.6* 31.0*   RDW 13.1 13.0 13.3 13.1    251 246 283     INR    Recent Labs  Lab 12/12/17  0629 12/11/17  1641 12/09/17  0358   INR 1.23* 1.13 1.08     LFTs    Recent Labs  Lab 12/13/17  0639 12/09/17  0358   ALKPHOS 75 80   AST 16 21   ALT 30 42   BILITOTAL 0.4 0.5   PROTTOTAL 7.1 7.8   ALBUMIN 2.5* 3.0*      PANC    Recent Labs  Lab 12/09/17  0358   LIPASE 189         IMAGING:  Exam: CT of the chest, abdomen, and pelvis with contrast 12/9/2017  5:19 AM      History: Suspected malignancy "      Comparison: CT 11/10/2017, 11/5/2017.     Technique: Helical acquisition from the thoracic inlet to the pubic  symphysis with the administration of IV.  Axial and coronal  reconstructions were viewed in bone, soft tissue, and lung windows.      Findings:      Chest: Lungs are relatively clear. The heart and great vessels are  normal in appearance.  There is no mediastinal or axillary  lymphadenopathy.  No pleural or pericardial effusion. Normal  appearance of the visualized thyroid gland.      Scattered tiny pulmonary nodules seen on series 4, without comparison:  2 mm, right lower lobe, image 81  2 mm, lingula, image 89  3 mm, left upper lobe, image 70, adjacent to mediastinum  2 mm, right upper lobe, image 55  2 mm, right upper lobe, image 57  Other small scattered pulmonary nodules are also visualized     Abdomen/pelvis:   Findings consistent with known necrotizing pancreatitis, with multiple  surrounding fluid collection with rim enhancement. The largest  collection adjacent and anterior to the pancreatic head has decreased  in size no measuring up to 8.3 x 4.4 x 7.0 cm. Gastric drainage  catheters in place. Air within the collection is likely from placement  of the drain. No definite loculated fluid component, there is  significant soft tissue thickening and fat stranding surrounding this  area. A mass in the pancreatic tail up to 3.2 x 2.8 x 2.6 cm. It is  largely unchanged in size. There is both solid and cystic components  with calcified rim. Significant fat stranding and reactive enlarged  peripancreatic lymph nodes.  Tiny hypodense lesion of the right kidney to small to characterize,  unchanged. Otherwise unremarkable appearance of the kidneys.. 2  hypodense lesions in the right hepatic lobe are again seen, largest  measuring 1 cm and previously characterized as a hemangioma (series 3  image 290). Smaller lesion on series 3 image 261 measures 7 mm and  there is difficult to characterize due to small  size. Unchanged area  wedge-shaped relative hypodensity in the right upper lobe. The  gallbladder, spleen, adrenals, are normal in appearance.       There are no dilated loops of small or large bowel. There is no bowel  wall thickening.  Mild reactive fat stranding of the right colon. The  appendix and terminal ileum are normal in appearance. No free  intraperitoneal air or fluid.  The urinary bladder is within normal  limits.     The aorta and IVC are normal in caliber.  The splenic vein and portal  veins are patent.       Mild degenerative findings of the spine. There are no aggressive  appearing bone lesions.         Impression:   1. Findings consistent with known necrotizing pancreatitis. The  associated acute necrotic collection has decreased in size with  cystogastrostomy drains in position. Tiny foci of air within the  collection is likely postprocedural but may represent infection.  2. Unchanged mass in the pancreatic tail which which was shown to be  adenocarcinoma by FNA.  3. Scattered tiny pulmonary nodules measuring less than 3 mm.  4. Mildly prominent mesenteric lymph nodes are likely reactive.  5. Unchanged small hypodensities in the right hepatic lobe, one of  which is previously characterized as hemangioma and the other which is  too small to characterize on this study. Liver MRI with Eovist could  be considered for additional characterization.

## 2017-12-13 NOTE — ANESTHESIA CARE TRANSFER NOTE
Patient: Kitty Bangura    Procedure(s):  Esophagogastroduodenoscopy with Necrosectomy, Balloon Dilation, stent removal X3 and Cystgastrostomy stent Placement X2 - Wound Class: II-Clean Contaminated    Diagnosis: Necrotizing Pancreatitis   Diagnosis Additional Information: No value filed.    Anesthesia Type:   General, ETT     Note:  Airway :Face Mask  Patient transferred to:PACU  Comments: Anesthesia Care Transfer Note    Patient: Kitty Bangura    Transferred to: PACU    Patient vital signs: stable    Airway: none    Monitors on, VSS, pt. Stable, Report given to PACU BRENDAN.     Ricardo Garsia CRNA  12/12/2017 6:19 PM      Handoff Report: Identifed the Patient, Identified the Reponsible Provider, Reviewed the pertinent medical history, Discussed the surgical course, Reviewed Intra-OP anesthesia mangement and issues during anesthesia, Set expectations for post-procedure period and Allowed opportunity for questions and acknowledgement of understanding      Vitals: (Last set prior to Anesthesia Care Transfer)    CRNA VITALS  12/12/2017 1744 - 12/12/2017 1819      12/12/2017             Pulse: 77    SpO2: 99 %    Resp Rate (observed): (!)  67                Electronically Signed By: TYLER Reid CRNA  December 12, 2017  6:19 PM

## 2017-12-13 NOTE — ANESTHESIA POSTPROCEDURE EVALUATION
Patient: Kitty Bangura    Procedure(s):  Esophagogastroduodenoscopy with Necrosectomy, Balloon Dilation, stent removal X3 and Cystgastrostomy stent Placement X2 - Wound Class: II-Clean Contaminated    Diagnosis:Necrotizing Pancreatitis   Diagnosis Additional Information: No value filed.    Anesthesia Type:  General, ETT    Note:  Anesthesia Post Evaluation    Patient location during evaluation: PACU  Patient participation: Able to fully participate in evaluation  Level of consciousness: awake and alert  Pain management: adequate  Airway patency: patent  Cardiovascular status: hemodynamically stable  Respiratory status: nasal cannula  Hydration status: euvolemic  PONV: none     Anesthetic complications: None          Last vitals:  Vitals:    12/12/17 1815 12/12/17 1830 12/12/17 1845   BP: 117/64 114/68 112/70   Pulse:      Resp: 16 16 16   Temp:  37.1  C (98.8  F) 37.1  C (98.8  F)   SpO2: 99% 97% 98%         Electronically Signed By: Rafi Escobar MD  December 12, 2017  7:15 PM

## 2017-12-13 NOTE — TELEPHONE ENCOUNTER
Hello,    Could you please assist in scheduling a CT scan abd/pelv without contrast in ~2 weeks?    Thank you,  Ewa Christopher PA-C  Advanced Endoscopy/Pancreaticobiliary Service  Pager [phone]

## 2017-12-13 NOTE — PLAN OF CARE
Problem: Patient Care Overview  Goal: Plan of Care/Patient Progress Review  Outcome: No Change  A&O x4, VSS, Independent, no complaints of nausea and vomiting or pain, had surgery, on capno for 24hrs, O2 sats in low 90s without O2, pt given zosyn after surgery, pt tolerated meal after surgery.

## 2017-12-13 NOTE — PLAN OF CARE
"Problem: Patient Care Overview  Goal: Plan of Care/Patient Progress Review  Outcome: Improving  BP (!) 88/51 (BP Location: Left arm)  Pulse 58  Temp 96.7  F (35.9  C) (Oral)  Resp 18  Ht 1.651 m (5' 5\")  Wt 81.7 kg (180 lb 3.2 oz)  SpO2 97%  BMI 29.99 kg/m2    Pt A and O. Afebrile. Lung sounds clear on RA. HR and pulses WDL. Bowel sounds active. No abd pain. Pt up ind. Adequate UOP. Pt tolerating PO intake. PIV in RUE saline locked, site WDL. Spouse at bedside, appropriate and attentive. Plan for DC later today?         "

## 2017-12-13 NOTE — PROGRESS NOTES
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      Changes today:   -To OR for necrosectomy  -Regular diet  -Discontinue maintenance IVF    Necrotizing pancreatitis   Hx of necrotizing pancreatitis s/p EUS w/ cystogastrostomy and FNA of panc mass on 11/29 and EGD necrosectomy w/ stent replacement on 12/4. Followed by Dr. Metz. CT abd with decreasing size in necrotic collection. Per GI, collection was well drained prior. Concern for possible infection, as patient says her symptoms are similar to prior admission when she needed drainage. Mild leukocytosis. Spiked fever to 101.4 on 12/10, 12/11.  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 More likely mucinous cystadenocarcinoma rather than pancreatic adenocarcinoma. Pt states she is in discussions about resection and treatment options. CT with nodule in the lungs, possibly concerning for progression of disease.   -Has surgical oncology follow-up planned as outpatient       Normocytic Anemia: MCV's in the high 90s. Present since 12/10. Thought to be dilutional and/or bone marrow suppression during acute illness, worsened by phlebotomy.   -Recheck day after surgery    FEN: Regular diet  Lines: PIV  DVT: Mechanical  Code status: Full  Dispo: likely discharge in 1-2 days     Clinical decision making discussed with Dr. Yodit Cowan MD  Internal Medicine Resident,  "[id number]  [id number]  ___________________________________________________________________    Subjective & Interval History:   No fevers last night.   No significant abdominal pain. Understandably frustrated with lack of procedure yesterday. Anticipating possible procedure today.  Has been NPO since midnight    4 point review of systems otherwise negative.     Medications: Reviewed in EPIC. List below for reference    Physical Exam:    Blood pressure 104/54, pulse 75, temperature 96.3  F (35.7  C), temperature source Oral, resp. rate 16, height 1.651 m (5' 5\"), weight 81.7 kg (180 lb 3.2 oz), SpO2 93 %.    Exam:  General: the patient is pleasant, resting comfortably, no acute distress  HEENT: Normocephalic, atraumatic. MMM, PERRL  Respiratory: Clear to auscultation, no wheezes or crackles.   CV: RRR, nl s1 and s2, no m/r/g.   Abdominal: Soft, nondistended. Mildly tender in the epigastric region. No rebound or guarding. Bowel sounds active.  Musculoskeletal: No lower extremity edema. Peripheral pulses strong and symmetric. ROM full  Skin: no appreciable skin lesions/rashes   Neuro: Alert, awake, appropriate. No focal deficits  Psych: normal affect, answering questions appropriately. Bright affect.    Lines/Tubes:   Peripheral IV 12/09/17 Right Upper arm (Active)   Site Assessment WDL 12/10/2017  8:00 AM   Line Status Saline locked 12/10/2017  8:00 AM   Phlebitis Scale 0-->no symptoms 12/10/2017  8:00 AM   Infiltration Scale 0 12/10/2017  8:00 AM   Extravasation? No 12/10/2017  8:00 AM   Number of days:1       Labs & Studies of Note: Reviewed in Epic  CMP    Recent Labs  Lab 12/12/17  0629 12/10/17  0559 12/09/17  0358    144 138   POTASSIUM 3.6 4.4 4.2   CHLORIDE 104 110* 105   CO2 24 28 26   ANIONGAP 11 5 6   GLC 92 100* 137*   BUN 4* 6* 11   CR 0.66 0.87 0.77   GFRESTIMATED >90 67 76   GFRESTBLACK >90 81 >90   ILIANA 8.7 8.7 9.0   PROTTOTAL  --   --  7.8   ALBUMIN  --   --  3.0*   BILITOTAL  --   --  0.5   ALKPHOS  --   -- "  80   AST  --   --  21   ALT  --   --  42       CBC    Recent Labs  Lab 12/12/17  0629 12/11/17  0646 12/10/17  0559 12/09/17  0358   WBC 11.5* 13.0* 12.7* 14.5*   RBC 3.23* 3.33* 3.53* 3.90   HGB 9.6* 10.0* 10.6* 12.0   HCT 31.2* 32.7* 34.2* 37.4    246 283 336       INR    Recent Labs  Lab 12/12/17  0629 12/11/17  1641 12/09/17  0358   INR 1.23* 1.13 1.08       Unresulted Labs Ordered in the Past 30 Days of this Admission     Date and Time Order Name Status Description    12/10/2017 0933 Blood culture Preliminary     12/10/2017 0933 Blood culture Preliminary         Medication List for Reference  Current Facility-Administered Medications   Medication     naloxone (NARCAN) injection 0.1-0.4 mg     influenza quadrivalent (PF) vacc age 3 yrs and older (FLUZONE or Flulaval) injection 0.5 mL     amylase-lipase-protease (CREON 24) 65824-94395 UNITS per EC capsule 24,000-48,000 Units     amylase-lipase-protease (CREON 24) 30001-13335 UNITS per EC capsule 48,000-72,000 Units     acetaminophen (TYLENOL) tablet 650 mg     piperacillin-tazobactam (ZOSYN) 3.375 in 15 mL NS Premix Syringe

## 2017-12-13 NOTE — BRIEF OP NOTE
[facility] Brief Operative Note    Pre-operative diagnosis: Necrotizing Pancreatitis    Post-operative diagnosis * No post-op diagnosis entered *   Procedure: Procedure(s):  Esophagogastroduodenoscopy with Necrosectomy, Balloon Dilation, stent removal X3 and Cystgastrostomy stent Placement X2 - Wound Class: II-Clean Contaminated   Surgeon: Guru Jhoan MD       Estimated blood loss: None    Specimens: None   Findings:    Three stents were in place  Copious amount of pus and necrotic debris was debrided  Cavity appeared collapsed  A 10 Fr by 3 cm and 10 Fr by 1 cm Solus double pigtailed stent was left in the cavity    Recommendations    Continue antibiotics  Follow up with DR Metz

## 2017-12-14 ENCOUNTER — CARE COORDINATION (OUTPATIENT)
Dept: GASTROENTEROLOGY | Facility: CLINIC | Age: 57
End: 2017-12-14

## 2017-12-14 ENCOUNTER — CARE COORDINATION (OUTPATIENT)
Dept: CARE COORDINATION | Facility: CLINIC | Age: 57
End: 2017-12-14

## 2017-12-14 NOTE — DISCHARGE SUMMARY
Pt discharged with . Education material provided, went over appointments and new medications. Medications were picked up by . Pt and 's questions were answered.

## 2017-12-14 NOTE — PROGRESS NOTES
Post upper GI endoscopy (12/12/2017) with Dr. Staples: Follow-up    Post procedure recommendations: - Will monitor clinical course including fevers and chills. Inpatient panc-bili consult team will continue to follow pt - Will recommend repeat CT scan in 1-2 weeks to re-evaluate the residual necrotic collection and follow up with Dr Metz in Panc-Bili clinic     Spoke with patients  because patient is getting some rest.  He states she does not have any pain, no nausea, vomiting or fevers.  He states they are waiting to be in touch with Dr. Nicci Handley's RN.  Message sent to Emmanuelle to reach out to them.    Orders placed: None at this time    Contact information verified for future questions/concerns.    Gagan SCALES, RN Coordinator  Dr. Sanches, Dr. Hanson & Dr. Staples  Advanced Endoscopy  [phone]

## 2017-12-14 NOTE — PROGRESS NOTES
Care Coordination Telephone Call  GI Service and Surgical Oncology    Called patient to discuss how she is doing post hospital stay.  I spoke with her  as she was resting.  He denies fever, chills, pain or issues with eating at this time.  I have confirmed that plan is for Ct in two weeks, then Dr. Metz will review and we will decide plan from there.  Voiced understanding.    I have asked the patient to call with any additional questions or concerns and have provided my contact information.    Emmanuelle GILLETTEN, HNBC, STAR-T  RN Care Coordinator  Surgical Oncology and GI service  Ph: [phone]  FAX: [phone]

## 2017-12-14 NOTE — DISCHARGE SUMMARY
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 For mor information on admission presentation, refer to H&P dated 12/9/17.          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 Findings consistent with known necrotizing pancreatitis. The  associated acute necrotic collection has decreased in size with  cystogastrostomy drains in position. Tiny foci of air within the  collection is likely postprocedural but may represent infection.  2. Unchanged mass in the pancreatic tail which which was shown to be  adenocarcinoma by FNA.  3. Scattered tiny pulmonary nodules measuring less than 3 mm.  4. Mildly prominent mesenteric lymph nodes are likely reactive.  5. Unchanged small hypodensities in the right hepatic lobe, one of  which is previously characterized as hemangioma and the other which is  too small to characterize on this study. Liver MRI with Eovist could  be considered for additional characterization.    MRI Abd (12/10)  IMPRESSION:   1. Liver hemangiomas without suspicious lesion.  2. Unchanged mass in the pancreatic tail (adenocarcinoma by FNA)  3. Unchanged fluid collections around the pancreas  consistent with  necrotizing pancreatitis, no significant change from comparison CT.   4. Trace pleural effusion bilaterally.       EUS with necrosectomy 12/12  Findings:     Three stents were in place  Copious amount of pus and necrotic debris was debrided  Cavity appeared collapsed  A 10 Fr by 3 cm and 10 Fr by 1 cm Solus double pigtailed stent was left in the cavity           Consultations:   Gastroenterology: panc/bili         Hospital Course by Problem:      Necrotizing pancreatitis: Hx of necrotizing pancreatitis s/p EUS w/ cystogastrostomy and FNA of panc mass on 11/29 and EGD necrosectomy w/ stent replacement on 12/4. She is followed by Dr. Metz. On admission she had leukocytosis to 14.5. CT abd was done with decreasing size in necrotic collection. She was started on IV zosyn and LR IVF for possible infection of necrotic collection. She spiked a fever on 12/10 and concern for infection christie. MRI with EOVIST was performed to better visualize collection and hepatic lesions. She underwent EUS on 12/12 and was found to have copious amounts of pus and necrotic tissue. Necrosectomy was performed and 2 stents placed. On discharge she was stabled with symptoms resolved. Given a 10 day course of cipro and flagyl, and plan to undergo follow up abd CT in 2 weeks to monitor progress of collection.       Suspected Mucinous Cystadenocarcinoma: Tissue showing adenocarcinoma. More likely mucinous cystadenocarcinoma rather than pancreatic adenocarcinoma. Pt states she is in discussions about resection and treatment options. CT with nodule in the lungs, possibly concerning for progression of disease. MRI with EOVIST performed to assess liver lesions which were consistent with hemangiomas rather than metastatic disease. Plan to follow up with GI outpatient for possible treatment.     Normocytic Anemia: Hgb of 12 on admission, MCV's in the high 90s. Drop in hgb on 12/10, s/p heavy fluid resuscitation/IVF. Thought to be  "dilutional and/or bone marrow suppression during acute illness, worsened by phlebotomy. Remained stable during remaining admission w/o sign of bleeding. Discharged with hgb of 10.5 and no sign of bleeding.      Physical Exam on day of Discharge:  Blood pressure (!) 88/51, pulse 58, temperature 96.7  F (35.9  C), temperature source Oral, resp. rate 18, height 1.651 m (5' 5\"), weight 81.7 kg (180 lb 3.2 oz), SpO2 97 %.  Physical Exam:  General: AAOx3, NAD  HEENT: MMM, PERRLA, EOM intact  CV: RRR, normal S1S2, no murmurs  Resp: Clear to auscultation bilaterally, no wheezes, rhonchi  Abd: Soft, Mild epigastric tenderness on palpation, BS+, no masses appreciated  Extremities: Radial and pedal pulses intact and symmetric, no pedal edema  Neuro: AAOx4, No lateralizing symptoms or focal neurologic deficits      Lines/Tubes:  None         Pending Results:   None         Discharge Medications:     Discharge Medication List as of 12/13/2017  4:25 PM      START taking these medications    Details   metroNIDAZOLE (FLAGYL) 500 MG tablet Take 1 tablet (500 mg) by mouth 2 times daily, Disp-20 tablet, R-0, E-Prescribe         CONTINUE these medications which have CHANGED    Details   ciprofloxacin (CIPRO) 500 MG tablet Take 1 tablet (500 mg) by mouth 2 times daily, Disp-20 tablet, R-0, E-Prescribe         CONTINUE these medications which have NOT CHANGED    Details   amylase-lipase-protease (CREON) 47486-65029 UNITS CPEP per EC capsule Take 2-3 with meals / 1-2 with snacks, up to 15 per day., Disp-450 capsule, R-6, E-Prescribe      acetaminophen (TYLENOL) 325 MG tablet Take 2 tablets (650 mg) by mouth every 4 hours as needed for mild pain, Disp-100 tablet, No Print Out                  Discharge Instructions and Follow-Up:     Discharge Procedure Orders  Reason for your hospital stay   Order Comments: You were hospitalized for necrotizing pancreatitis. You should take the cipro antibiotics until you are able to return on Thursday " for your procedure.   Should you spike fevers or have worsening abdominal pain, please come back to the hospital.     Activity   Order Comments: Your activity upon discharge: activity as tolerated   Order Specific Question Answer Comments   Is discharge order? Yes      Discharge Instructions   Order Comments: Resume pre procedure diet     Discharge Instructions   Order Comments: Restart home medications.     Adult [facility] Follow-up and recommended labs and tests   Order Comments: Appointments on [place] and/or [facility] (with [facility] or [facility] provider or service). Call [phone] if you haven't heard regarding these appointments within 7 days of discharge.    F/u CT abdomen at [facility] in 2 weeks     Diet   Order Comments: Follow this diet upon discharge: Low fat diet   Order Specific Question Answer Comments   Is discharge order? Yes             IV access: None          Discharge Disposition:   To home         Condition on Discharge:   Discharge condition: Stable   Code status on discharge: Full Code        Date of service: 12/13/2017    The patient was discussed with Dr. Monsivais, who is in agreement of above plan    Whitney Cowan MD  Internal Medicine Resident, PGY1  [phone]

## 2017-12-16 LAB
BACTERIA SPEC CULT: NO GROWTH
BACTERIA SPEC CULT: NO GROWTH
SPECIMEN SOURCE: NORMAL
SPECIMEN SOURCE: NORMAL

## 2017-12-26 RX ORDER — IOPAMIDOL 755 MG/ML
88 INJECTION, SOLUTION INTRAVASCULAR ONCE
Status: COMPLETED | OUTPATIENT
Start: 2017-12-27 | End: 2017-12-27

## 2017-12-27 ENCOUNTER — HOSPITAL ENCOUNTER (OUTPATIENT)
Dept: CT IMAGING | Facility: CLINIC | Age: 57
Discharge: HOME | End: 2017-12-27
Attending: PHYSICIAN ASSISTANT | Admitting: PHYSICIAN ASSISTANT

## 2017-12-27 DIAGNOSIS — K85.91 NECROTIZING PANCREATITIS: ICD-10-CM

## 2017-12-27 PROCEDURE — 74177 CT ABD & PELVIS W/CONTRAST: CPT

## 2017-12-27 PROCEDURE — 25000125 ZZHC RX 250: Performed by: RADIOLOGY

## 2017-12-27 PROCEDURE — 25000128 H RX IP 250 OP 636: Performed by: RADIOLOGY

## 2017-12-27 RX ADMIN — SODIUM CHLORIDE 62 ML: 9 INJECTION, SOLUTION INTRAVENOUS at 08:52

## 2017-12-27 RX ADMIN — IOPAMIDOL 88 ML: 755 INJECTION, SOLUTION INTRAVENOUS at 08:51

## 2017-12-28 ENCOUNTER — CARE COORDINATION (OUTPATIENT)
Dept: GASTROENTEROLOGY | Facility: CLINIC | Age: 57
End: 2017-12-28

## 2017-12-28 NOTE — PROGRESS NOTES
Care Coordination Telephone Call  GI Service and Surgical Oncology    Patient called to discuss passing of stent per rectum.  Relates abdominal pain that was relieved by passing of pigtail stent.  Dr. Metz aware and reviewed recent CT and I have provided Kitty with the results of the CT at the request of Dr. Metz.  I have informed Kitty that Dr. Metz said that she will pass the other stent also so not to be alarmed.  I have asked her to call with any fevers or chills or increasing abdominal pain.    I have asked the patient to call with any additional questions or concerns and have provided my contact information.    Plan:  See surgery for consult    Emmanuelle GILLETTEN, HNBC, STAR-T  RN Care Coordinator  Surgical Oncology and GI service  Ph: [phone]  FAX: [phone]

## 2017-12-29 ENCOUNTER — TELEPHONE (OUTPATIENT)
Dept: GASTROENTEROLOGY | Facility: CLINIC | Age: 57
End: 2017-12-29

## 2017-12-29 NOTE — TELEPHONE ENCOUNTER
Called pt to follow-up regarding pain. Did take mag citrate and had BM today with improvement but not complete resolution in pain. Overall feeling better. No fevers.    Rec'd continued conservative management. If pain worsens or develops fevers, recommended she come to our ED further further evaluation.    WINSTON Metz MD  Associate Professor of Medicine  Division of Gastroenterology, Hepatology and Nutrition  [facility]

## 2018-01-02 ENCOUNTER — CARE COORDINATION (OUTPATIENT)
Dept: GASTROENTEROLOGY | Facility: CLINIC | Age: 58
End: 2018-01-02

## 2018-01-02 NOTE — PROGRESS NOTES
Care Coordination Telephone Call  GI Service and Surgical Oncology    Called patient to discuss how she is feeling.  She relates that she did take the Mag Citrate and has liquid stool but is not sure if she passed the second stent.  Relates that abdominal pain has slowly improved now.  She is not having fever or chills, is eating regular food and overall feeling better.    I have asked the patient to call with any additional questions or concerns and have provided my contact information.    Plan:  Clinic appts next Monday have been confirmed    Emmanuelle BROOKS, HNBC, STAR-T  RN Care Coordinator  Surgical Oncology and GI service  Ph: [phone]  FAX: [phone]

## 2018-01-05 ENCOUNTER — CARE COORDINATION (OUTPATIENT)
Dept: SURGERY | Facility: CLINIC | Age: 58
End: 2018-01-05

## 2018-01-05 DIAGNOSIS — K85.91 NECROTIZING PANCREATITIS: Primary | ICD-10-CM

## 2018-01-05 ASSESSMENT — ENCOUNTER SYMPTOMS
POLYPHAGIA: 0
CONSTIPATION: 1
SYNCOPE: 0
HEARTBURN: 0
DIARRHEA: 1
JOINT SWELLING: 0
SNORES LOUDLY: 0
CONSTIPATION: 1
NECK PAIN: 0
EYE WATERING: 0
EYE PAIN: 0
BACK PAIN: 1
FEVER: 0
PALPITATIONS: 0
WEIGHT LOSS: 1
SYNCOPE: 0
EYE IRRITATION: 0
SHORTNESS OF BREATH: 1
PALPITATIONS: 0
NIGHT SWEATS: 0
ABDOMINAL PAIN: 1
DYSPNEA ON EXERTION: 0
FATIGUE: 1
MUSCLE WEAKNESS: 0
POLYPHAGIA: 0
DOUBLE VISION: 0
WHEEZING: 0
VOMITING: 0
POLYDIPSIA: 0
ALTERED TEMPERATURE REGULATION: 1
BLOOD IN STOOL: 0
POOR WOUND HEALING: 0
MUSCLE CRAMPS: 1
SMELL DISTURBANCE: 0
HEMOPTYSIS: 0
NAUSEA: 0
MYALGIAS: 0
POOR WOUND HEALING: 0
HYPOTENSION: 1
SORE THROAT: 0
STIFFNESS: 0
DYSPNEA ON EXERTION: 0
SLEEP DISTURBANCES DUE TO BREATHING: 0
RECTAL PAIN: 0
SINUS CONGESTION: 0
ARTHRALGIAS: 0
EYE IRRITATION: 0
ORTHOPNEA: 0
SKIN CHANGES: 0
EYE PAIN: 0
EXERCISE INTOLERANCE: 0
HOARSE VOICE: 0
BOWEL INCONTINENCE: 0
HYPOTENSION: 1
DECREASED APPETITE: 1
EYE WATERING: 0
ABDOMINAL PAIN: 1
WEIGHT GAIN: 0
BOWEL INCONTINENCE: 0
SLEEP DISTURBANCES DUE TO BREATHING: 0
SPUTUM PRODUCTION: 1
STIFFNESS: 0
SINUS CONGESTION: 0
DIARRHEA: 1
TASTE DISTURBANCE: 1
NECK MASS: 0
TROUBLE SWALLOWING: 0
POSTURAL DYSPNEA: 0
SHORTNESS OF BREATH: 1
WEIGHT LOSS: 1
POSTURAL DYSPNEA: 0
DECREASED APPETITE: 1
HOARSE VOICE: 0
EYE REDNESS: 0
NECK MASS: 0
COUGH DISTURBING SLEEP: 0
HALLUCINATIONS: 0
DOUBLE VISION: 0
BACK PAIN: 1
WEIGHT GAIN: 0
HYPERTENSION: 0
NAIL CHANGES: 0
NAIL CHANGES: 0
CHILLS: 0
TROUBLE SWALLOWING: 0
NAUSEA: 0
RECTAL PAIN: 0
JAUNDICE: 0
BLOATING: 1
MYALGIAS: 0
POLYDIPSIA: 0
ORTHOPNEA: 0
HEMOPTYSIS: 0
COUGH DISTURBING SLEEP: 0
MUSCLE WEAKNESS: 0
BLOOD IN STOOL: 0
WHEEZING: 0
LIGHT-HEADEDNESS: 1
SORE THROAT: 0
FEVER: 0
NECK PAIN: 0
LEG PAIN: 0
VOMITING: 0
SPUTUM PRODUCTION: 1
BLOATING: 1
LIGHT-HEADEDNESS: 1
ALTERED TEMPERATURE REGULATION: 1
HEARTBURN: 0
EXERCISE INTOLERANCE: 0
EYE REDNESS: 0
SINUS PAIN: 0
SMELL DISTURBANCE: 0
MUSCLE CRAMPS: 1
SKIN CHANGES: 0
JAUNDICE: 0
HYPERTENSION: 0
FATIGUE: 1
HALLUCINATIONS: 0
INCREASED ENERGY: 0
COUGH: 0
COUGH: 0
TASTE DISTURBANCE: 1
INCREASED ENERGY: 0
SINUS PAIN: 0
LEG PAIN: 0
ARTHRALGIAS: 0
SNORES LOUDLY: 0
CHILLS: 0
JOINT SWELLING: 0
NIGHT SWEATS: 0

## 2018-01-07 NOTE — PROGRESS NOTES
NEW PATIENT VISIT    NAME: Kitty Bangura   DATE: Jan 8, 2018    MRN: [id number]    REFERRING PHYSICIAN:     CC/PATIENT ID: newly diagnosed pancreatic tail adenocarcinoma     HPI: Kitty Bangura is a 57 year old woman, previously healthy until she presented in early November 2017 with abdominal pain. CT abdomen on 11/1/17 showed acute pancreatitis (lipase 41,960) and also a 3cm heterogenous pancreatic tail mass. The etiology of pancreatitis is unclear - no obstructing gallstone and not a heavy drinker. She was hospitalized for one week and followed up with Dr. González Metz in GI clinic.   - On 11/29/17 she had endoscopic drainage of walled-off area of pancreatic necrosis by Dr. Metz. During the same procedure she had an EUS FNA of the pancreatic tail mass and pathology showed adenocarcinoma. The mass measured 33 x 27 mm, encapsulated with solid and cystic components, rim calcification, and no evidence of invasion.   - Of note, an abdominal MRI in 2004 showed a 2.5 cm cystic lesion in the tail of the pancreas. She had no interval imaging between 2004 and 2017.   - Her case was discussed at tumor conference on 12/4/17 with plan to complete staging and refer to medical and surgical oncology   - Staging CT chest/abd/pelvis on 12/9/17 showed several small pulmonary nodules (largest 3mm), unchanged mass in the pancreatic tail, mildly prominent mesenteric nodes thought likely reactive, and small right hepatic lobe hypodensities. Abdominal MRI on 12/10/17 showed hepatic hemangiomas, no evidence of metastatic disease to the liver.   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     Interim history: Kitty is here with her  for initial medical oncology visit. A few days ago she had cramping abdominal pain that resolved with a bowel movement after taking Mag citrate. Over the past few weeks she is having similar  "abdominal pain for several days per week. Not taking anything for the pain. She tried hydrocodone once but it was not helpful. No abdominal pain today.     She is taking Creon. Bowel movements are small and hard. Not having any diarrhea (except after Mag citrate).  She is back to working part time and walked a mile over the weekend. Feels close to her baseline.   No fevers, swollen glands, or n/v. She has a painful tooth that is being extracted later this week.      PAST MEDICAL HISTORY:   Pre-diabetes  Pancreatitis as above    PAST SURGICAL HISTORY:  Laparotomy x2 for ruptured tubal pregnancies    Discectomy for herniated lumbar disk  Breast reduction surgery    FAMILY HISTORY:  Colon cancer in maternal aunt  Paternal aunt and cousins with breast cancer  CAD in father and brother    SH: from: [place], [place]    Children: 29 yr-old son    Occupation: [employer]   Tobacco: former smoker, quit 30 years ago  Alcohol: two glasses of wine per night, none since pancreatitis diagnosis   Drugs:    MEDS:    Current Outpatient Prescriptions:      amylase-lipase-protease (CREON) [id number] UNITS CPEP per EC capsule, Take 2-3 with meals / 1-2 with snacks, up to 15 per day., Disp: 450 capsule, Rfl: 6     acetaminophen (TYLENOL) 325 MG tablet, Take 2 tablets (650 mg) by mouth every 4 hours as needed for mild pain (Patient not taking: Reported on 2018), Disp: 100 tablet, Rfl:     ALLERGIES: none    ROS: Full 12-point ROS reviewed and negative unless reported above. Pertinent symptoms reviewed above per HPI.    PHYSICAL EXAM:  /80 (BP Location: Right arm, Patient Position: Sitting, Cuff Size: Adult Regular)  Pulse 61  Temp 97.4  F (36.3  C) (Oral)  Ht 1.651 m (5' 5\")  Wt 76.5 kg (168 lb 11.2 oz)  SpO2 98%  BMI 28.07 kg/m2  KPS 90%  General: NAD, alert and interactive  HEENT: PERRL, MMM, no oral lesions  Lungs: CTA bilaterally  Cardiovascular: RRR, no M/R/G, no edema  Abdominal/Rectal: +BS, soft, " non-distended, mild to moderate tenderness to palpation throughout abdomen, no rebound tenderness or peritoneal signs   Lymph: no cervical, supraclavicular, axillary, or inguinal adenopathy  MSK: normal muscle tone  Skin: no rashes or petechaie  Neuro: A&O       LABS REVIEWED ON DAY OF VISIT  CBC RESULTS:   Recent Labs   Lab Test  12/13/17   0639   WBC  9.3   RBC  3.49*   HGB  10.5*   HCT  33.7*   MCV  97   MCH  30.1   MCHC  31.2*   RDW  13.1   PLT  284     Recent Labs   Lab Test  12/13/17   0639  12/12/17   0629   NA  142  139   POTASSIUM  4.2  3.6   CHLORIDE  107  104   CO2  26  24   ANIONGAP  9  11   GLC  120*  92   BUN  7  4*   CR  0.65  0.66   ILIANA  8.8  8.7     Liver Function Studies -   Recent Labs   Lab Test  12/13/17   0639   PROTTOTAL  7.1   ALBUMIN  2.5*   BILITOTAL  0.4   ALKPHOS  75   AST  16   ALT  30       RADIOLOGY:  CT chest/abd/pelvis 12/9/17  1. Findings consistent with known necrotizing pancreatitis. The  associated acute necrotic collection has decreased in size with  cystogastrostomy drains in position. Tiny foci of air within the  collection is likely postprocedural but may represent infection.  2. Unchanged mass in the pancreatic tail which which was shown to be  adenocarcinoma by FNA.  3. Scattered tiny pulmonary nodules measuring less than 3 mm.  4. Mildly prominent mesenteric lymph nodes are likely reactive.  5. Unchanged small hypodensities in the right hepatic lobe, one of  which is previously characterized as hemangioma and the other which is  too small to characterize on this study. Liver MRI with Eovist could  be considered for additional characterization.    CT abd/pelvis 12/27/17  1. Significant improvement in the known necrotic pancreatitis as  described.  2. Migration of the drainage catheters into the small bowel.  3. Gastric wall edema, unknown etiology. Reactive versus primary  gastric process such as gastritis or ulcer.  4. There appears to be diffuse wall thickening of the  sigmoid colon  when compared to the prior exam. Although this could represent  peristalsis, it raises the question of mild colitis.    IMPRESSION/PLAN:  Kitty Bangura is a 58 y/o woman, previously healthy, with a new diagnosis of localized pancreatic adenocarcinoma. She presented in November 2017 with acute pancreatitis. Along with evidence of pancreatitis, CT abdomen also found a 3 cm pancreatic tail mass. FNA of this mass on 11/29/17 showed adenocarcinoma. The mass is a complex cystic structure and was actually present on an MRI in 2004. 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     Staging scans show no evidence of local invasion or distant mets. She has small pulmonary nodules (largest 3mm) that are unlikely to be metastatic in nature. We do not have a baseline Ca 19-9 yet.      Kitty is meeting with Dr. Byrd from surgical oncology later today and on imaging the mass appears to be surgically resectable. We discussed in detail today the role of neoadjuvant and adjuvant chemotherapy to treat micrometastatic disease and decrease the risk of recurrence. With surgery alone, even with an R0 resection, the risk of recurrence for pancreatic adenocarcinoma is quite high, over 80%. With the addition of chemotherapy our goal is to significantly reduce the risk of recurrence and improve survival.     Our approach is to give neoadjuvant chemotherapy for two months followed by repeat imaging. Kitty has an excellent performance status and is a good candidate for the more aggressive FOLFIRINOX regimen. This was studied in metastatic disease (Omero et al, [facility] 2011) and showed a 30% response rate, compared to only 10% response rate for gemcitabine. We are extrapolating this data to the neoadjuvant setting. The risk of grade 3-4 neutropenia is about 50% and risk of febrile neutropenia is 5%. Other frequent side effects are anemia, thrombocytopenia,  "neuropathy, nausea, and diarrhea. This is given intravenously in 14-day cycles. The 5-FU is given as a bolus followed by 46-hr continuous infusion at home.     If imaging after 4 cycles shows stable disease or better, then she will likely proceed to surgical resection. Following surgical resection we will plan to give 4 additional months of chemotherapy for a total of 6 months. The adjuvant therapy will be gem/Xeloda per the ESPAC-4 trial. This therapy is all given with curative intent.     We discussed the case with Dr. Byrd today and he agrees with starting with neoadjuvant chemotherapy.  Kitty will need a new baseline CT and port placement prior to starting chemotherapy.      - CT chest/abd/pelvis within one week  - port placement  - CA 19-9, CBC, and CMP prior to first cycle  - return next week with AMOR appointment, then infusion for cycle 1 of FOLFIRINOX  - AMOR appointment every two weeks for cycles 2-4  - repeat CT cap and labs one week after cycle 4  - follow-up with Dr. Monk two weeks after cycle 4       Patient seen and d/w staff Dr. Aleshia Ponce MD  Heme/Onc Fellow  Pager: [phone]      MEDICAL ONCOLOGY ATTENDING PHYSICIAN ADDENDUM:  I have seen and evaluated this patient with the medical oncology fellow. I have reviewed and edited this fellow's note, and agree with the assessment and plan stated above.     Mrs. Bangura is accompanied today by her .  She is kindly referred Dr. Guru Staples, Dr. Jamey Metz, and Dr. Vito Sanches of the GI Service here at the [facility] for her newly diagnosed pancreatic tail adenocarcinoma.  She was also referred by my colleague in the Neurosurgery Department, Dr. Ja Byrd, who is also evaluating the patient this morning.  Mrs. Kitty Bangura is a 57-year-old  woman from [place], [place].  She does not have any significant known past medical history, other than \"prediabetes\" said to be diagnosed a few years ago, and " a  spinal MRI that made an incidental notation of a pancreatic cyst in that general region of the pancreas.  On 2017, she woke up with severe abdominal pain that was new.  She was not having any abdominal pain prior to that.  She was hospitalized at [facility] for about a week.  She was found to have a lipase greater than 40,000.  She had an extensive hospitalization that included CT scans and also MRI/MRCP that revealed a pancreatic tail mass.  She was felt to have necrotizing pancreatitis with the finding of a related mass about 3 cm in diameter.  She had subsequent followup with Dr. Vito Sanches and also Dr. Jamey Metz.  In late December, Dr. Metz performed an EUS, and the area of the necrotizing pancreatitis harbored pancreatic adenocarcinoma.  Thus, the patient was kindly referred here to see me for Medical Oncology purposes, and also Dr. Ja Byrd of the General Surgery Team.  She is feeling well today.  As recently as 3-4 days ago, she was having tight band-like pain that was also radiating to the back.  Dr. Metz had prescribed Creon, and she says this has mildly helped.  She is also trying Tylenol, but this was not of much help either.  She was prescribed hydrocodone during her hospitalization, but she is not taking this voluntarily.  She drinks about 2 drinks a week for many years, and one of the scans revealed some fatty infiltration of her liver.  It is unclear if the underlying pancreatitis is due to the chronic alcohol use.      PAST MEDICAL HISTORY:  Only notable for necrotizing pancreatitis detected in 2017, and a questionable history of prediabetes.      PAST SURGICAL HISTORY:   1.  Ruptured tubal pregnancy.   2.  Lower back pain and discectomy.    3.  She had a  section as well.      FAMILY HISTORY:  Paternal aunt had breast cancer.  Paternal cousins, who were daughters of that aunt, also have had breast cancer.  No known family cancer genetics.       SOCIAL HISTORY:  The patient lives in [place], [place], with her .  Occupation:  She works as a :  Tobacco:  Remote history of smoking less than 10-pack years, but quit more than 30 years ago.  Alcohol:  Drinks 2 drinks of wine per day for many years, unspecified.  Drugs:  Denies illicit drug use.      MEDICATIONS:  Fully Reviewed in Epic.      ALLERGIES:  As reviewed in Epic.      REVIEW OF SYSTEMS:  Full 10-point review of systems was performed.  Pertinent symptoms are reviewed above per HPI.      PHYSICAL EXAM:   KPS:  %.   GENERAL:  Very pleasant older  woman who appears younger than her stated age.  In no acute distress, alert and oriented x3.   HEENT:  Normocephalic, atraumatic.  PERRLA, oropharynx clear with no mucositis or thrush.   LYMPH NODES:  No palpable pre/post-auricular, cervical, axillary, or inguinal lymphadenopathy appreciated.   CV:  RRR, normal S1 S2.  No murmurs, gallops, or rubs.   LUNGS:  Clear to auscultation bilaterally.  No dullness to percussion.   ABDOMEN:  Soft, nondistended.  Mild to moderate tenderness elicited upon deep palpation rather diffusely, most prominently in the left upper quadrant and right lower and right upper quadrants, but no palpable masses are detected.  Bowel sounds heard x4.  No apparent hepatosplenomegaly.   EXTREMITIES:  No clubbing, cyanosis, or edema.   NEUROLOGIC:  Cranial Nerves II-XII grossly intact.  Motor strength and sensation grossly intact.       LABORATORY DATA:  Reviewed on the day of the visit, including a CA 19-9, which we have ordered today.  No baseline has yet been ordered.      RADIOLOGY:  We have personally reviewed the images and the reports of the 11/01/2017 scans and subsequent scans, including MRI and CT scans.  I printed out the CT scan of the abdomen and pelvis from 12/27/2017 for the patient, as well as the pathology report from Dr. Metz's EUS.      IMPRESSION AND PLAN:  A 57-year-old woman from  [place], [place], with necrotizing pancreatitis detected in 11/2017.  She still has some moderate pain.  She has the diagnosis of pancreatic adenocarcinoma in the pancreatic tail region with some possible history of cysts as far back as 2004 found incidentally.  She will meet with Dr. Ja Byrd from our team today.  We reviewed the natural course, biology, diagnosis and treatment of pancreatic adenocarcinoma.  I reviewed that no more than 15% of pancreatic adenocarcinomas are considered potentially resectable at the time of diagnosis, and that resection is the foundation for potential intent-to-cure treatment.  We discussed chemotherapy's intent to reduce risk of recurrence in the adjuvant or perioperative setting.  We reviewed that the standard of care remains surgical resection followed by 6 months of adjuvant chemotherapy, but in recent years our practice and others worldwide have tended to do more therapy in the upfront setting.  The advantages of this would be to ensure that at least some chemotherapy treatment is given in the event that the patient has a prolonged and difficult postoperative recovery, also with the opportunity to potentially improve the conversion to full surgical resectability, and also to try and eradicate micrometastatic disease.  The patient stated verbal consent for moving forward with getting a port placed, and moving forward with FOLFIRINOX as neoadjuvant-intent therapy.  We will move forward with 4 cycles, and have the patient get a CT chest, abdomen and pelvis, and CA 19-9 following the fourth cycle.  She will see Kellie Nobles from our physician assistant team for the first several cycles to ensure she is doing well.  We reviewed all of the above to the best of our ability.  Mainly, the patient's questions focused on logistics of surgery, and I will defer those questions to Dr. Ja Byrd.      Thank you very much for this kind referral.      I spent greater than 60  minutes in consultation, including history and physical, and 45 minutes in discussion.  Over 50% of the time was spent in counseling and coordination of care.  I spent 20 minutes prior to the visit reviewing the patient's medical records, images, imaging reports, outside clinical records and lab values.      cc: MD Jamey Keys MD Guru Trikudanathan, MD Jason Denbo, MD           I spent 60 minutes in consultation, including H&P and Discussion. >50% of time was spent in counseling and in coordination of care.    Jovany Monk MD, PhD

## 2018-01-08 ENCOUNTER — OFFICE VISIT (OUTPATIENT)
Dept: SURGERY | Facility: CLINIC | Age: 58
End: 2018-01-08
Attending: SURGERY
Payer: COMMERCIAL

## 2018-01-08 ENCOUNTER — CARE COORDINATION (OUTPATIENT)
Dept: ONCOLOGY | Facility: CLINIC | Age: 58
End: 2018-01-08

## 2018-01-08 ENCOUNTER — HOME INFUSION (PRE-WILLOW HOME INFUSION) (OUTPATIENT)
Dept: PHARMACY | Facility: CLINIC | Age: 58
End: 2018-01-08

## 2018-01-08 ENCOUNTER — ONCOLOGY VISIT (OUTPATIENT)
Dept: ONCOLOGY | Facility: CLINIC | Age: 58
End: 2018-01-08
Attending: INTERNAL MEDICINE
Payer: COMMERCIAL

## 2018-01-08 VITALS
DIASTOLIC BLOOD PRESSURE: 80 MMHG | BODY MASS INDEX: 28.11 KG/M2 | HEIGHT: 65 IN | TEMPERATURE: 97.4 F | WEIGHT: 168.7 LBS | HEART RATE: 61 BPM | OXYGEN SATURATION: 98 % | SYSTOLIC BLOOD PRESSURE: 116 MMHG

## 2018-01-08 VITALS
HEIGHT: 65 IN | DIASTOLIC BLOOD PRESSURE: 80 MMHG | SYSTOLIC BLOOD PRESSURE: 116 MMHG | OXYGEN SATURATION: 98 % | TEMPERATURE: 97.4 F | BODY MASS INDEX: 28.11 KG/M2 | HEART RATE: 61 BPM | WEIGHT: 168.7 LBS

## 2018-01-08 DIAGNOSIS — K85.91 NECROTIZING PANCREATITIS: ICD-10-CM

## 2018-01-08 DIAGNOSIS — C25.9 PANCREATIC ADENOCARCINOMA (H): Primary | ICD-10-CM

## 2018-01-08 LAB — LIPASE SERPL-CCNC: 171 U/L (ref 73–393)

## 2018-01-08 PROCEDURE — 36415 COLL VENOUS BLD VENIPUNCTURE: CPT | Performed by: INTERNAL MEDICINE

## 2018-01-08 PROCEDURE — 99205 OFFICE O/P NEW HI 60 MIN: CPT | Mod: ZP | Performed by: SURGERY

## 2018-01-08 PROCEDURE — G0463 HOSPITAL OUTPT CLINIC VISIT: HCPCS | Mod: ZF

## 2018-01-08 PROCEDURE — 99205 OFFICE O/P NEW HI 60 MIN: CPT | Mod: ZP | Performed by: INTERNAL MEDICINE

## 2018-01-08 PROCEDURE — 83690 ASSAY OF LIPASE: CPT | Performed by: INTERNAL MEDICINE

## 2018-01-08 ASSESSMENT — PAIN SCALES - GENERAL
PAINLEVEL: NO PAIN (0)
PAINLEVEL: NO PAIN (0)

## 2018-01-08 NOTE — MR AVS SNAPSHOT
After Visit Summary   1/8/2018    Kitty Bangura    MRN: [id number]           Patient Information     Date Of Birth          1960        Visit Information        Provider Department      1/8/2018 9:30 AM Ja Byrd MD [facility]        Today's Diagnoses     Pancreatic adenocarcinoma (H)    -  1       Follow-ups after your visit        Your next 10 appointments already scheduled     Darryl 10, 2018 10:30 AM CST   (Arrive by 10:15 AM)   CT CHEST/ABDOMEN/PELVIS W CONTRAST with WYCT1   [facility] CT ([facility])    [address]  [facility] [address]   [phone]           Please bring any scans or X-rays taken at other hospitals, if similar tests were done. Also bring a list of your medicines, including vitamins, minerals and over-the-counter drugs. It is safest to leave personal items at home.  Be sure to tell your doctor:   If you have any allergies.   If there s any chance you are pregnant.   If you are breastfeeding.   If you have any special needs.  You may have contrast for this exam. To prepare:   Do not eat or drink for 2 hours before your exam. If you need to take medicine, you may take it with small sips of water. (We may ask you to take liquid medicine as well.)   The day before your exam, drink extra fluids at least six 8-ounce glasses (unless your doctor tells you to restrict your fluids).  Patients over 70 or patients with diabetes or kidney problems:   If you haven t had a blood test (creatinine test) within the last 30 days, go to your clinic or Diagnostic Imaging Department for this test.  If you have diabetes:   If your kidney function is normal, continue taking your metformin (Avandamet, Glucophage, Glucovance, Metaglip) on the day of your exam.   If your kidney function is abnormal, wait 48 hours before restarting this medicine.  You will have oral contrast for this exam:   You will drink the contrast at home. Get this from  your clinic or Diagnostic Imaging Department. Please follow the directions given.  Please wear loose clothing, such as a sweat suit or jogging clothes. Avoid snaps, zippers and other metal. We may ask you to undress and put on a hospital gown.  If you have any questions, please call the Imaging Department where you will have your exam.            Jan 12, 2018   Procedure with Chanda Lawson PA-C   [facility] ([facility])    [address] [place] [address] [phone]           Located in the Clinics and Surgery Center at [address] [place] [address].   parking is very convenient and highly recommended.  is a $6 flat rate fee.  Both  and self parkers should enter the main arrival plaza from [facility]; parking attendants will direct you based on your parking preference.            Jan 12, 2018  9:45 AM CST   (Arrive by 8:15 AM)   IR CHEST PORT PLACEMENT > 5 YRS OF AGE with [id number]   [facility] Imaging ([facility])    [address] [place] [address]              1. Your doctor will need to do a history and physical within 7 days before this procedure. 2. Your doctor will which medications should not be taken the morning of the exam. 3. Laboratory tests are to be obtained by your doctor prior to the exam (Basic Metabolic Panel, CBCP, PTT and INR) (No labs needed if you are having a tunneled catheter exchange or removal) 4. If you have allergies to x-ray contrast or iodine, contact your doctor or a Radiology nurse prior to the exam day for instructions. 5. Someone will need to drive you to and from the hospital. 6. If you are or may be pregnant, contact your doctor or a Radiology nurse prior to the day of the exam. 7. If you have diabetes, check with your doctor or a Radiology nurse to see if your insulin needs to be adjusted for the exam. 8. If you are  taking a medication called Glucophage or Glucovance; these medications need to be held the day of the exam and for approximately 48 hours following. A blood sample must be drawn so your creatinine level can be checked before resuming this medication. 9. If you are taking Coumadin (to thin you blood) please contact your doctor or a Radiology nurse at least 3 days before the exam for special instructions. 10. You should not have received contrast within 48 hours of this exam. 11. The day before your exam you may eat your regular diet and are encouraged to drink at least 2 quarts of clear liquids. Drink no alcoholic beverages for 24 hours prior to the exam. 12. If you have a colostomy you will need to irrigate it with tap water at 8PM the evening before and again at 6AM the morning of the exam. 13. Do not smoke for 24 hours prior to the procedure. 14. Birth to 4 years: - Breast feeding must be stopped 4 hours prior to exam - Solid food or formula must be stopped 6 hours prior to exam - Tube feedings must be stopped 6 hours prior to exam 15. 4-10 years old: - Nothing to eat or drink 6 hours prior to exam 16. 10+ years old: - Nothing to eat or drink 8 hours prior to exam 17. The morning of the exam you may brush your teeth and take medications as directed with a sip of water. 18. When discharged, you cannot drive until morning, and an adult must be with you until then. You should stay in the [facility] overnight. 19. Bring a list of all drugs you are taking; include supplements and over-the-counter medications. Wear comfortable clothes and leave your valuables at home.            Darryl 15, 2018  8:00 AM [facility]   [facility] Lab Draw with  [employer] LAB DRAW   [facility] Lab Draw ([facility])    [address]  [address]  [facility] [address] [phone]            Darryl 15, 2018  8:30 AM CST   (Arrive by 8:15 AM)   Return Visit with FORREST Olmos [facility] ([facility])    [address]  [address]  [facility] [address]   [phone]            Darryl 15, 2018 10:00 AM CST   Infusion 360 with UC ONCOLOGY INFUSION, UC 22 ATC   [facility] ([facility])    [address]  [address]  [facility] [address]   [phone]            Jan 29, 2018  8:15 AM CST   [facility] Lab Draw with UC MASONIC LAB DRAW   [facility] Lab Draw ([facility])    [address]  [address]  [facility] [address]   [phone]            Jan 29, 2018  8:40 AM CST   (Arrive by 8:25 AM)   Return Visit with DANTE Ackerman   [facility] ([facility])    [address] [place] [address]  [address]  [facility] [address]   [phone]            Jan 29, 2018  9:00 AM CST   Infusion 360 with UC ONCOLOGY INFUSION   [facility] ([facility])    [address] [place] [address]  [address]  [facility] [address]   [phone]              Future tests that were ordered for you today     Open Future Orders        Priority Expected Expires Ordered    CT Chest/Abdomen/Pelvis w Contrast Routine 3/8/2018 1/8/2019 1/8/2018    IR Chest Port Placement > 5 Yrs of Age Routine 1/10/2018 1/8/2019 1/8/2018    CT Chest/Abdomen/Pelvis w Contrast Routine 1/9/2018 1/8/2019 1/8/2018    Cancer antigen 19-9 Routine  1/8/2019 1/8/2018    *CBC with platelets differential Routine 1/15/2018 1/8/2019 1/8/2018    Comprehensive metabolic panel Routine 1/15/2018 1/8/2019 1/8/2018    Cancer antigen 19-9 Routine 3/8/2018 1/8/2019 1/8/2018            Who to contact     If you have questions or need follow up information about today's clinic visit or your schedule please contact [facility] directly at [phone].  Normal or non-critical lab and imaging results will be communicated to you by Gail, letter  "or phone within 4 business days after the clinic has received the results. If you do not hear from us within 7 days, please contact the clinic through PT PAL or phone. If you have a critical or abnormal lab result, we will notify you by phone as soon as possible.  Submit refill requests through PT PAL or call your pharmacy and they will forward the refill request to us. Please allow 3 business days for your refill to be completed.          Additional Information About Your Visit        PT PAL Information     PT PAL gives you secure access to your electronic health record. If you see a primary care provider, you can also send messages to your care team and make appointments. If you have questions, please call your primary care clinic.  If you do not have a primary care provider, please call [phone] and they will assist you.        Care EveryWhere ID     This is your Care EveryWhere ID. This could be used by other organizations to access your [facility] medical records  [id number]        Your Vitals Were     Pulse Temperature Height Pulse Oximetry BMI (Body Mass Index)       61 97.4  F (36.3  C) (Oral) 1.651 m (5' 5\") 98% 28.07 kg/m2        Blood Pressure from Last 3 Encounters:   01/08/18 116/80   01/08/18 116/80   12/13/17 (!) 88/51    Weight from Last 3 Encounters:   01/08/18 76.5 kg (168 lb 11.2 oz)   01/08/18 76.5 kg (168 lb 11.2 oz)   12/12/17 81.7 kg (180 lb 3.2 oz)              Today, you had the following     No orders found for display       Primary Care Provider Office Phone # Fax #    [place] Julito Mcgill [phone] [address] [address]        Equal Access to Services     CHASE DE GUZMAN : Tre Ruiz, solange rasmussen, re hernandez. So [facility] [phone].    ATENCIÓN: Si habla español, tiene a quiros disposición servicios gratuitos de asistencia lingüística. Llame al [phone].    We " comply with applicable federal civil rights laws and [place] laws. We do not discriminate on the basis of race, color, national origin, age, disability, sex, sexual orientation, or gender identity.            Thank you!     Thank you for choosing [facility]  for your care. Our goal is always to provide you with excellent care. Hearing back from our patients is one way we can continue to improve our services. Please take a few minutes to complete the written survey that you may receive in the mail after your visit with us. Thank you!             Your Updated Medication List - Protect others around you: Learn how to safely use, store and throw away your medicines at www.disposemymeds.org.          This list is accurate as of: 1/8/18  8:51 PM.  Always use your most recent med list.                   Brand Name Dispense Instructions for use Diagnosis    acetaminophen 325 MG tablet    TYLENOL    100 tablet    Take 2 tablets (650 mg) by mouth every 4 hours as needed for mild pain    Acute pancreatitis without infection or necrosis, unspecified pancreatitis type       amylase-lipase-protease [id number] UNITS Cpep per EC capsule    CREON    450 capsule    Take 2-3 with meals / 1-2 with snacks, up to 15 per day.    Necrotizing pancreatitis

## 2018-01-08 NOTE — PROGRESS NOTES
Therapy: 5fu  Insurance: [employer]    Ded: $7050  Met: $0  Co-Insurance:70%  Max Out of Pocket: $7350  Met: $0    Pt may have up to 50% copay per dispense on the drug.      In reference to referral made on 1/08/18 to check 5fu pump and home disconnect coverage.     Please contact Intake with any questions, [phone] or In Basket pool, [facility] ([phone]).

## 2018-01-08 NOTE — PROGRESS NOTES
Dx:  Pancreatic adenocarcinoma  Rx:  Fluorouracil, oxaliplatin and irinotecan    The patient was provided a My Cancer Guidebook, ChemoCare hand-outs on fluorouracil, oxaliplatin and irinotecan, American Cancer Society information on the diagnosis of pancreas cancer and contact phone numbers for the patient's RNCC, Triage and after-hours care.    Possible side effects were reviewed including neutropenia, anemia, thrombocytopenia, nausea, vomiting, diarrhea, constipation, fatigue, peripheral neuropathy, and hair loss.  The patient was encouraged to call for any questions or concerns.  The patient was encouraged to call for T > 100.4, symptoms of feeling feverish, chilled, shaking chills, cough, congestion, shortness of breath, mouth sores, uncontrolled nausea, vomiting, diarrhea, overwhelming fatigue, swelling of the lower extremities or for any further concerns.  Highlighted steps to expect when getting chemotherapy (check in, labs, pre meds, infusion).  Discusses that chemo may be delayed due to blood counts or patient s need to modify.  Included a one page resource of symptoms and when to contact the Cancer Clinic with questions.  We reviewed the [facility] of the [facility] ER if needed.  The patient was accompanied by her .  She is currently working as a para-legal.    Discussed port placement, care and reviewed the port book.

## 2018-01-08 NOTE — MR AVS SNAPSHOT
After Visit Summary   1/8/2018    Kitty Bangura    MRN: [id number]           Patient Information     Date Of Birth          1960        Visit Information        Provider Department      1/8/2018 8:00 AM Jovany Monk MD [facility]        Today's Diagnoses     Pancreatic adenocarcinoma (H)    -  1       Follow-ups after your visit        Your next 10 appointments already scheduled     Jan 29, 2018  8:15 AM CST   [facility] Lab Draw with UC MASONIC LAB DRAW   [facility] Lab Draw ([facility])    [address] [place] [address]  [address]  [facility] [address]   [phone]            Jan 29, 2018  8:40 AM CST   (Arrive by 8:25 AM)   Return Visit with Kellie Nobles PA-C   [facility] ([facility])    [address] [place] [address]  [address]  [facility] [address]   [phone]            Jan 29, 2018  9:00 AM CST   Infusion 360 with UC ONCOLOGY INFUSION, UC 26 ATC   [facility] ([facility])    [address] [place] [address]  [address]  [facility] [address]   [phone]            Jan 29, 2018 11:00 AM CST   New Patient Visit with Shivani Noel RD   [facility] ([facility])    [address] [place] [address]  [address]  [facility] [address]   [phone]            Feb 12, 2018  9:15 AM CST   [facility] Lab Draw with UC MASONIC LAB DRAW   [facility] Lab Draw ([facility])    [address] [place] [address]  [address]  [facility] [address]   [phone]            Feb 12, 2018  9:40 AM CST   (Arrive by 9:25 AM)   Return Visit with FORREST Ackerman [facility] ([facility])    [address] [place] [address]  [address]  [facility] [address]   [phone]            Feb 12, 2018 10:30 AM CST   Infusion 360 with UC ONCOLOGY INFUSION, UC 21 ATC   [facility] ([facility])    [address]  [address]  [facility] [address] [phone]            Feb 26, 2018  8:00 AM [facility]   [facility] Lab Draw with  [facility] LAB DRAW   [facility] Lab Draw ([facility])    [address]  [address]  [facility] [address] [phone]              Future tests that were ordered for you today     Open Future Orders        Priority Expected Expires Ordered    MA Screening Digital Bilateral Routine  1/15/2019 1/15/2018            Who to contact     If you have questions or need follow up information about today's clinic visit or your schedule please contact [facility] directly at [phone].  Normal or non-critical lab and imaging results will be communicated to you by Phizzlehart, letter or phone within 4 business days after the clinic has received the results. If you do not hear from us within 7 days, please contact the clinic through Phizzlehart or phone. If you have a critical or abnormal lab result, we will notify you by phone as soon as possible.  Submit refill requests through Red Rover or call your pharmacy and they will forward the refill request to us. Please allow 3 business days for your refill to be completed.          Additional Information About Your Visit        Red Rover Information     [employer] gives you secure access to your electronic health record. If you see a primary care provider, you can also send messages to your care team and make appointments. If you have questions, please call your primary care clinic.  If you do not have a primary care provider, please call [phone] and they will assist you.        Care EveryWhere ID     This is your Care EveryWhere ID. This could be used by other organizations to access your [facility] medical records  [id number]        Your Vitals Were     Pulse Temperature Height Pulse Oximetry BMI (Body Mass Index)       61 97.4  F  "(36.3  C) (Oral) 1.651 m (5' 5\") 98% 28.07 kg/m2        Blood Pressure from Last 3 Encounters:   01/15/18 112/76   01/12/18 95/60   01/08/18 116/80    Weight from Last 3 Encounters:   01/15/18 76.6 kg (168 lb 14.4 oz)   01/12/18 76.2 kg (168 lb)   01/08/18 76.5 kg (168 lb 11.2 oz)               Primary Care Provider Office Phone # Fax #    [place] Julito Mcgill [phone] [address] [address]        Equal Access to Services     CHASE DE GUZMAN : Tre Ruiz, waivy rasmussen, qajayro kaalmafredy ferguson, re yip. So [facility] [phone].    ATENCIÓN: Si habla español, tiene a quiros disposición servicios gratuitos de asistencia lingüística. [facility] [phone].    We comply with applicable federal civil rights laws and [place] laws. We do not discriminate on the basis of race, color, national origin, age, disability, sex, sexual orientation, or gender identity.            Thank you!     Thank you for choosing [facility]  for your care. Our goal is always to provide you with excellent care. Hearing back from our patients is one way we can continue to improve our services. Please take a few minutes to complete the written survey that you may receive in the mail after your visit with us. Thank you!             Your Updated Medication List - Protect others around you: Learn how to safely use, store and throw away your medicines at www.disposemymeds.org.          This list is accurate as of: 1/8/18 11:59 PM.  Always use your most recent med list.                   Brand Name Dispense Instructions for use Diagnosis    acetaminophen 325 MG tablet    TYLENOL    100 tablet    Take 2 tablets (650 mg) by mouth every 4 hours as needed for mild pain    Acute pancreatitis without infection or necrosis, unspecified pancreatitis type       amylase-lipase-protease 29675-00075 UNITS Cpep per EC capsule    CREON    450 capsule    " Take 2-3 with meals / 1-2 with snacks, up to 15 per day.    Necrotizing pancreatitis       HYDROcodone-acetaminophen 5-325 MG per tablet    NORCO     Take 1 tablet by mouth every 6 hours as needed for moderate to severe pain

## 2018-01-08 NOTE — NURSING NOTE
"Oncology Rooming Note    January 8, 2018 8:02 AM   Kitty Bangura is a 57 year old female who presents for:    Chief Complaint   Patient presents with     Oncology Clinic Visit     New Patient-Pancreatic CA     Initial Vitals: /80 (BP Location: Right arm, Patient Position: Sitting, Cuff Size: Adult Regular)  Pulse 61  Temp 97.4  F (36.3  C) (Oral)  Ht 1.651 m (5' 5\")  Wt 76.5 kg (168 lb 11.2 oz)  SpO2 98%  BMI 28.07 kg/m2 Estimated body mass index is 28.07 kg/(m^2) as calculated from the following:    Height as of this encounter: 1.651 m (5' 5\").    Weight as of this encounter: 76.5 kg (168 lb 11.2 oz). Body surface area is 1.87 meters squared.  No Pain (0) Comment: Data Unavailable   No LMP recorded. Patient is postmenopausal.  Allergies reviewed: Yes  Medications reviewed: Yes    Medications: Medication refills not needed today.  Pharmacy name entered into [facility]:    [facility] - [place], [place] - [address] [place] [address] [facility] - [place], [place] - [address] AT [facility] OF [address] [place] [address]    Clinical concerns: Questions Dr. Byrd was notified.    10 minutes for nursing intake (face to face time)     Jany Pérez LPN              "

## 2018-01-08 NOTE — LETTER
1/8/2018       RE: Kitty Bangura  [id number] [facility] [address]     Dear Colleague,    Thank you for referring your patient, Kitty Bangura, to the [facility]. Please see a copy of my visit note below.    [facility] - Surgical Oncology    NEW CONSULTATION  Jan 8, 2018    Kitty Bangura is a 57 year old female who presents with pancreatic adenocarcinoma diagnosed during a bout of acute necrotizing pancreatitis. She was referred by Dr Metz.    HISTORY OF PRESENT ILLNESS:  She developed severe abdominal pain and was found to have severe pancreatitis. She had evidence of pancreatic necrosis and required multiple endoscopic debridement procedures by Dr. Metz. Her last endoscopic procedure was approximately 1 month ago. She reports that she has recovered quite well. She is tolerating a regular diet and occasionally has upper abdominal bloating and discomfort. She is maintaining her weight and has no current complaints. During Dr. Metz's index endoscopic debridement procedure he also used EUS to further evaluate this cystic and solid mass in the tail of the pancreas and performed an FNA. Pathology returned with adenocarcinoma.  On further questioning, the patient does report that she had an MRI for back pain when she had disc issues in 2004 and was told there was a cyst on the pancreas at that time.  She never had any follow-up imaging.    She had a CT scan at the end of December and Dr. Metz was pleased with the resolution of her necrosis in the appearance of the pancreas. Thus, she was referred to me and Dr. Jovany Monk.     Past Medical History:   Diagnosis Date     Cancer (H)      Necrotizing pancreatitis      PONV (postoperative nausea and vomiting)        Past Surgical History:   Procedure Laterality Date     ABDOMEN SURGERY  1983    Ruptured tubal pregnancies, additional surgeries followed     BACK SURGERY  2004    L5, S1 discectomy     BREAST  SURGERY      Breast reduction     COLONOSCOPY       ENDOSCOPIC ULTRASOUND, ESOPHAGOSCOPY, GASTROSCOPY, DUODENOSCOPY (EGD), NECROSECTOMY N/A 2017    Procedure: ENDOSCOPIC ULTRASOUND, ESOPHAGOSCOPY, GASTROSCOPY, DUODENOSCOPY (EGD), NECROSECTOMY;  Esophagogastroduodenoscopy, with  Necrosectomy and stents replacement  ;  Surgeon: González Metz MD;  Location: [facility] OR     ENDOSCOPIC ULTRASOUND, ESOPHAGOSCOPY, GASTROSCOPY, DUODENOSCOPY (EGD), NECROSECTOMY N/A 2017    Procedure: ENDOSCOPIC ULTRASOUND, ESOPHAGOSCOPY, GASTROSCOPY, DUODENOSCOPY (EGD), NECROSECTOMY;  Esophagogastroduodenoscopy with Necrosectomy, Balloon Dilation, stent removal X3 and Cystgastrostomy stent Placement X2;  Surgeon: Guru Cecilia Staples MD;  Location: [facility] OR     ESOPHAGOSCOPY, GASTROSCOPY, DUODENOSCOPY (EGD), COMBINED N/A 2017    Procedure: COMBINED ENDOSCOPIC ULTRASOUND, ESOPHAGOSCOPY, GASTROSCOPY, DUODENOSCOPY (EGD), FINE NEEDLE ASPIRATE/BIOPSY;  Endoscopic Ultrasound with cystgastrostomy and fine needle aspiration ;  Surgeon: González Metz MD;  Location: [facility] OR     GYN SURGERY      Multiple ectopic pregnancies, reconnect,         Current Outpatient Prescriptions   Medication Sig Dispense Refill     amylase-lipase-protease (CREON) 47510-73353 UNITS CPEP per EC capsule Take 2-3 with meals / 1-2 with snacks, up to 15 per day. 450 capsule 6     acetaminophen (TYLENOL) 325 MG tablet Take 2 tablets (650 mg) by mouth every 4 hours as needed for mild pain (Patient not taking: Reported on 2018) 100 tablet          No Known Allergies     SOCIAL HISTORY:   reports that she quit smoking about 37 years ago. Her smoking use included Cigarettes. She started smoking about 44 years ago. She smoked 0.50 packs per day. She has never used smokeless tobacco. She reports that she does not drink alcohol or use illicit drugs.    FAMILY HISTORY:  Family History   Problem Relation Age of Onset      "HEART DISEASE Father      Hyperlipidemia Father      HEART DISEASE Brother      DIABETES Mother      Hypertension Mother      Obesity Mother      DIABETES Maternal Grandfather      Hypertension Maternal Grandfather      Obesity Maternal Grandfather      DIABETES Sister      Hypertension Sister      Depression Sister      Anxiety Disorder Sister      Obesity Sister      Hypertension Brother      Hyperlipidemia Brother      Breast Cancer Cousin      Breast Cancer Cousin      Breast Cancer Cousin      Colon Cancer Other      Other Cancer Other      Mesothelioma     Depression Sister      Anxiety Disorder Brother      Anxiety Disorder Son      MENTAL ILLNESS Sister      Schizophrenia     Obesity Maternal Grandmother      Obesity Sister        ROS  A full 14-point review of systems was performed, and the pertinent positives and negatives were mentioned in the history of present illness.    /80 (BP Location: Right arm, Patient Position: Sitting, Cuff Size: Adult Regular)  Pulse 61  Temp 97.4  F (36.3  C) (Oral)  Ht 1.651 m (5' 5\")  Wt 76.5 kg (168 lb 11.2 oz)  SpO2 98%  BMI 28.07 kg/m2   Physical Exam  General: No acute distress.  Head: Anicteric sclera.  Neck: No cervical or supraclavicular adenopathy.  Pulmonary: Bilateral chest rise.  Cardiac: Regular rate and rhythm.  Abdomen: Pfannenstiel scar, soft, nondistended, mild discomfort to deep palpation in the epigastrium. No masses or organomegaly appreciated.  Extremities: No lower extremity edema.    INVESTIGATIONS:  Labs: Were reviewed from December.  No CA-19-9 has been drawn but this is been ordered by the medical oncologist.    CT (November and December 2017): At her index presentation there was extensive fluid collection and area of necrosis within the lesser sac.  She underwent a series of endoscopic debridements and drainage procedures.  On her most recent CT scan at the end of December there are no remaining fluid collections.  Although, there is " still extensive inflammatory changes within the lesser sac also extending into the transverse mesocolon posterior to the stomach.  Did not see any evidence of metastatic disease.  She does have liver lesions that were previously evaluated with an MRI consistent with a hemangioma.  The 3 cm mass within the tail the pancreas, which is biopsy-proven adenocarcinoma, appears to have some cystic and solid components.  All of her mesenteric vessels appear patent and uninvolved.    Biopsy (December 2017): Adenocarcinoma.    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    The natural history of pancreatic adenocarcinoma and pancreatitis were discussed with the patient and her .  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  I explained that this lesion appears to be resectable.  The complicating factor in her case is the fact that she is recovering from a recent bout of necrotizing pancreatitis.  There is undoubtedly significant inflammatory disease within the lesser sac in the retroperitoneum.  I explained that surgery at the current time would be quite difficult and likely have increased risks of bleeding, etc. given the pancreatitis and inflammation.  I explained that I think is most prudent to proceed with some neoadjuvant chemotherapy with repeat imaging in 2-3 months, and reassessment for surgical resection at that time.  I also explained the additional potential benefits of neoadjuvant chemotherapy.  I briefly discussed the procedure that would be required which is a distal pancreatectomy and splenectomy and some of the most common  short-term and long-term complications.  We will plan to see her back in 2-3 months in conjunction with Dr. Monk with repeat staging studies.    Total time spent with the patient was 60 minutes, of which more than half was counseling.     Ja Byrd MD    Division of Surgical Oncology  [facility]

## 2018-01-08 NOTE — NURSING NOTE
"Oncology Rooming Note    January 8, 2018 7:45 AM   Kitty Bangura is a 57 year old female who presents for:    Chief Complaint   Patient presents with     Oncology Clinic Visit     New Patient-Pancreatic CA     Initial Vitals: /80 (BP Location: Right arm, Patient Position: Sitting, Cuff Size: Adult Regular)  Pulse 61  Temp 97.4  F (36.3  C) (Oral)  Ht 1.651 m (5' 5\")  Wt 76.5 kg (168 lb 11.2 oz)  SpO2 98%  BMI 28.07 kg/m2 Estimated body mass index is 28.07 kg/(m^2) as calculated from the following:    Height as of this encounter: 1.651 m (5' 5\").    Weight as of this encounter: 76.5 kg (168 lb 11.2 oz). Body surface area is 1.87 meters squared.  No Pain (0) Comment: Data Unavailable   No LMP recorded. Patient is postmenopausal.  Allergies reviewed: Yes  Medications reviewed: Yes    Medications: Medication refills not needed today.  Pharmacy name entered into [facility]:    [id number] - [place], [place] - [address] [place] [address] [id number] - [place], [place] - [address] AT [facility] OF [address] [place] [address]    Clinical concerns: Questions Dr. Monk was notified.    10 minutes for nursing intake (face to face time)     Jany Pérez LPN              "

## 2018-01-08 NOTE — LETTER
1/8/2018       RE: Kitty Bangura  [id number] [facility] [address]     Dear Colleague,    Thank you for referring your patient, Kitty Bangura, to the [facility]. Please see a copy of my visit note below.    NEW PATIENT VISIT    NAME: Kitty Bangura   DATE: Jan 8, 2018    MRN: [id number]    REFERRING PHYSICIAN:     CC/PATIENT ID: newly diagnosed pancreatic tail adenocarcinoma     HPI: Kitty Bangura is a 57 year old woman, previously healthy until she presented in early November 2017 with abdominal pain. CT abdomen on 11/1/17 showed acute pancreatitis (lipase 41,960) and also a 3cm heterogenous pancreatic tail mass. The etiology of pancreatitis is unclear - no obstructing gallstone and not a heavy drinker. She was hospitalized for one week and followed up with Dr. González Metz in GI clinic.   - On 11/29/17 she had endoscopic drainage of walled-off area of pancreatic necrosis by Dr. Metz. During the same procedure she had an EUS FNA of the pancreatic tail mass and pathology showed adenocarcinoma. The mass measured 33 x 27 mm, encapsulated with solid and cystic components, rim calcification, and no evidence of invasion.   - Of note, an abdominal MRI in 2004 showed a 2.5 cm cystic lesion in the tail of the pancreas. She had no interval imaging between 2004 and 2017.   - Her case was discussed at tumor conference on 12/4/17 with plan to complete staging and refer to medical and surgical oncology   - Staging CT chest/abd/pelvis on 12/9/17 showed several small pulmonary nodules (largest 3mm), unchanged mass in the pancreatic tail, mildly prominent mesenteric nodes thought likely reactive, and small right hepatic lobe hypodensities. Abdominal MRI on 12/10/17 showed hepatic hemangiomas, no evidence of metastatic disease to the liver.   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      Interim history: Kitty is here with her  for initial medical oncology visit. A few days ago she had cramping abdominal pain that resolved with a bowel movement after taking Mag citrate. Over the past few weeks she is having similar abdominal pain for several days per week. Not taking anything for the pain. She tried hydrocodone once but it was not helpful. No abdominal pain today.     She is taking Creon. Bowel movements are small and hard. Not having any diarrhea (except after Mag citrate).  She is back to working part time and walked a mile over the weekend. Feels close to her baseline.   No fevers, swollen glands, or n/v. She has a painful tooth that is being extracted later this week.      PAST MEDICAL HISTORY:   Pre-diabetes  Pancreatitis as above    PAST SURGICAL HISTORY:  Laparotomy x2 for ruptured tubal pregnancies    Discectomy for herniated lumbar disk  Breast reduction surgery    FAMILY HISTORY:  Colon cancer in maternal aunt  Paternal aunt and cousins with breast cancer  CAD in father and brother    SH: from: [place], [place]    Children: 29 yr-old son    Occupation: [employer]   Tobacco: former smoker, quit 30 years ago  Alcohol: two glasses of wine per night, none since pancreatitis diagnosis   Drugs:    MEDS:    Current Outpatient Prescriptions:      amylase-lipase-protease (CREON) 65429-96156 UNITS CPEP per EC capsule, Take 2-3 with meals / 1-2 with snacks, up to 15 per day., Disp: 450 capsule, Rfl: 6     acetaminophen (TYLENOL) 325 MG tablet, Take 2 tablets (650 mg) by mouth every 4 hours as needed for mild pain (Patient not taking: Reported on 2018), Disp: 100 tablet, Rfl:     ALLERGIES: none    ROS: Full 12-point ROS reviewed and negative unless reported above. Pertinent symptoms reviewed above per HPI.    PHYSICAL EXAM:  /80 (BP Location: Right arm, Patient Position: Sitting, Cuff Size: Adult Regular)  Pulse 61  Temp 97.4  F (36.3  C) (Oral)  Ht 1.651 m (5'  "5\")  Wt 76.5 kg (168 lb 11.2 oz)  SpO2 98%  BMI 28.07 kg/m2  KPS 90%  General: NAD, alert and interactive  HEENT: PERRL, MMM, no oral lesions  Lungs: CTA bilaterally  Cardiovascular: RRR, no M/R/G, no edema  Abdominal/Rectal: +BS, soft, non-distended, mild to moderate tenderness to palpation throughout abdomen, no rebound tenderness or peritoneal signs   Lymph: no cervical, supraclavicular, axillary, or inguinal adenopathy  MSK: normal muscle tone  Skin: no rashes or petechaie  Neuro: A&O       LABS REVIEWED ON DAY OF VISIT  CBC RESULTS:   Recent Labs   Lab Test  12/13/17   0639   WBC  9.3   RBC  3.49*   HGB  10.5*   HCT  33.7*   MCV  97   MCH  30.1   MCHC  31.2*   RDW  13.1   PLT  284     Recent Labs   Lab Test  12/13/17   0639  12/12/17   0629   NA  142  139   POTASSIUM  4.2  3.6   CHLORIDE  107  104   CO2  26  24   ANIONGAP  9  11   GLC  120*  92   BUN  7  4*   CR  0.65  0.66   ILIANA  8.8  8.7     Liver Function Studies -   Recent Labs   Lab Test  12/13/17   0639   PROTTOTAL  7.1   ALBUMIN  2.5*   BILITOTAL  0.4   ALKPHOS  75   AST  16   ALT  30       RADIOLOGY:  CT chest/abd/pelvis 12/9/17  1. Findings consistent with known necrotizing pancreatitis. The  associated acute necrotic collection has decreased in size with  cystogastrostomy drains in position. Tiny foci of air within the  collection is likely postprocedural but may represent infection.  2. Unchanged mass in the pancreatic tail which which was shown to be  adenocarcinoma by FNA.  3. Scattered tiny pulmonary nodules measuring less than 3 mm.  4. Mildly prominent mesenteric lymph nodes are likely reactive.  5. Unchanged small hypodensities in the right hepatic lobe, one of  which is previously characterized as hemangioma and the other which is  too small to characterize on this study. Liver MRI with Eovist could  be considered for additional characterization.    CT abd/pelvis 12/27/17  1. Significant improvement in the known necrotic pancreatitis " as  described.  2. Migration of the drainage catheters into the small bowel.  3. Gastric wall edema, unknown etiology. Reactive versus primary  gastric process such as gastritis or ulcer.  4. There appears to be diffuse wall thickening of the sigmoid colon  when compared to the prior exam. Although this could represent  peristalsis, it raises the question of mild colitis.    IMPRESSION/PLAN:  Kitty Bangura is a 58 y/o woman, previously healthy, with a new diagnosis of localized pancreatic adenocarcinoma. She presented in November 2017 with acute pancreatitis. Along with evidence of pancreatitis, CT abdomen also found a 3 cm pancreatic tail mass. FNA of this mass on 11/29/17 showed adenocarcinoma. The mass is a complex cystic structure and was actually present on an MRI in 2004. 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     Staging scans show no evidence of local invasion or distant mets. She has small pulmonary nodules (largest 3mm) that are unlikely to be metastatic in nature. We do not have a baseline Ca 19-9 yet.      Kitty is meeting with Dr. Byrd from surgical oncology later today and on imaging the mass appears to be surgically resectable. We discussed in detail today the role of neoadjuvant and adjuvant chemotherapy to treat micrometastatic disease and decrease the risk of recurrence. With surgery alone, even with an R0 resection, the risk of recurrence for pancreatic adenocarcinoma is quite high, over 80%. With the addition of chemotherapy our goal is to significantly reduce the risk of recurrence and improve survival.     Our approach is to give neoadjuvant chemotherapy for two months followed by repeat imaging. Kitty has an excellent performance status and is a good candidate for the more aggressive FOLFIRINOX regimen. This was studied in metastatic disease (Omero et al, [facility] 2011) and showed a 30% response rate, compared to only 10%  response rate for gemcitabine. We are extrapolating this data to the neoadjuvant setting. The risk of grade 3-4 neutropenia is about 50% and risk of febrile neutropenia is 5%. Other frequent side effects are anemia, thrombocytopenia, neuropathy, nausea, and diarrhea. This is given intravenously in 14-day cycles. The 5-FU is given as a bolus followed by 46-hr continuous infusion at home.     If imaging after 4 cycles shows stable disease or better, then she will likely proceed to surgical resection. Following surgical resection we will plan to give 4 additional months of chemotherapy for a total of 6 months. The adjuvant therapy will be gem/Xeloda per the ESPAC-4 trial. This therapy is all given with curative intent.     We discussed the case with Dr. Byrd today and he agrees with starting with neoadjuvant chemotherapy.  Kitty will need a new baseline CT and port placement prior to starting chemotherapy.      - CT chest/abd/pelvis within one week  - port placement  - CA 19-9, CBC, and CMP prior to first cycle  - return next week with AMOR appointment, then infusion for cycle 1 of FOLFIRINOX  - AMOR appointment every two weeks for cycles 2-4  - repeat CT cap and labs one week after cycle 4  - follow-up with Dr. Monk two weeks after cycle 4       Patient seen and d/w staff Dr. Aleshia Ponce MD  Heme/Onc Fellow  Pager: [phone]      MEDICAL ONCOLOGY ATTENDING PHYSICIAN ADDENDUM:  I have seen and evaluated this patient with the medical oncology fellow. I have reviewed and edited this fellow's note, and agree with the assessment and plan stated above.     Mrs. Bangura is accompanied today by her .  She is kindly referred Dr. Guru Staples, Dr. Jamey Metz, and Dr. Vito Sanches of the GI Service here at the [facility] for her newly diagnosed pancreatic tail adenocarcinoma.  She was also referred by my colleague in the Neurosurgery Department, Dr. Ja Byrd, who is also evaluating the  "patient this morning.  Mrs. Kitty Bangura is a 57-year-old  woman from [place], [place].  She does not have any significant known past medical history, other than \"prediabetes\" said to be diagnosed a few years ago, and a 2004 spinal MRI that made an incidental notation of a pancreatic cyst in that general region of the pancreas.  On 11/01/2017, she woke up with severe abdominal pain that was new.  She was not having any abdominal pain prior to that.  She was hospitalized at [facility] for about a week.  She was found to have a lipase greater than 40,000.  She had an extensive hospitalization that included CT scans and also MRI/MRCP that revealed a pancreatic tail mass.  She was felt to have necrotizing pancreatitis with the finding of a related mass about 3 cm in diameter.  She had subsequent followup with Dr. Vito Sanches and also Dr. Jamey Metz.  In late December, Dr. Metz performed an EUS, and the area of the necrotizing pancreatitis harbored pancreatic adenocarcinoma.  Thus, the patient was kindly referred here to see me for Medical Oncology purposes, and also Dr. Ja Byrd of the General Surgery Team.  She is feeling well today.  As recently as 3-4 days ago, she was having tight band-like pain that was also radiating to the back.  Dr. Metz had prescribed Creon, and she says this has mildly helped.  She is also trying Tylenol, but this was not of much help either.  She was prescribed hydrocodone during her hospitalization, but she is not taking this voluntarily.  She drinks about 2 drinks a week for many years, and one of the scans revealed some fatty infiltration of her liver.  It is unclear if the underlying pancreatitis is due to the chronic alcohol use.      PAST MEDICAL HISTORY:  Only notable for necrotizing pancreatitis detected in 11/2017, and a questionable history of prediabetes.      PAST SURGICAL HISTORY:   1.  Ruptured tubal pregnancy.   2.  Lower back pain " and discectomy.    3.  She had a  section as well.      FAMILY HISTORY:  Paternal aunt had breast cancer.  Paternal cousins, who were daughters of that aunt, also have had breast cancer.  No known family cancer genetics.      SOCIAL HISTORY:  The patient lives in [place], [place], with her .  Occupation:  She works as a :  Tobacco:  Remote history of smoking less than 10-pack years, but quit more than 30 years ago.  Alcohol:  Drinks 2 drinks of wine per day for many years, unspecified.  Drugs:  Denies illicit drug use.      MEDICATIONS:  Fully Reviewed in Epic.      ALLERGIES:  As reviewed in Epic.      REVIEW OF SYSTEMS:  Full 10-point review of systems was performed.  Pertinent symptoms are reviewed above per HPI.      PHYSICAL EXAM:   KPS:  %.   GENERAL:  Very pleasant older  woman who appears younger than her stated age.  In no acute distress, alert and oriented x3.   HEENT:  Normocephalic, atraumatic.  PERRLA, oropharynx clear with no mucositis or thrush.   LYMPH NODES:  No palpable pre/post-auricular, cervical, axillary, or inguinal lymphadenopathy appreciated.   CV:  RRR, normal S1 S2.  No murmurs, gallops, or rubs.   LUNGS:  Clear to auscultation bilaterally.  No dullness to percussion.   ABDOMEN:  Soft, nondistended.  Mild to moderate tenderness elicited upon deep palpation rather diffusely, most prominently in the left upper quadrant and right lower and right upper quadrants, but no palpable masses are detected.  Bowel sounds heard x4.  No apparent hepatosplenomegaly.   EXTREMITIES:  No clubbing, cyanosis, or edema.   NEUROLOGIC:  Cranial Nerves II-XII grossly intact.  Motor strength and sensation grossly intact.       LABORATORY DATA:  Reviewed on the day of the visit, including a CA 19-9, which we have ordered today.  No baseline has yet been ordered.      RADIOLOGY:  We have personally reviewed the images and the reports of the 2017 scans and subsequent  scans, including MRI and CT scans.  I printed out the CT scan of the abdomen and pelvis from 12/27/2017 for the patient, as well as the pathology report from Dr. Metz's EUS.      IMPRESSION AND PLAN:  A 57-year-old woman from [place], [place], with necrotizing pancreatitis detected in 11/2017.  She still has some moderate pain.  She has the diagnosis of pancreatic adenocarcinoma in the pancreatic tail region with some possible history of cysts as far back as 2004 found incidentally.  She will meet with Dr. Ja Byrd from our team today.  We reviewed the natural course, biology, diagnosis and treatment of pancreatic adenocarcinoma.  I reviewed that no more than 15% of pancreatic adenocarcinomas are considered potentially resectable at the time of diagnosis, and that resection is the foundation for potential intent-to-cure treatment.  We discussed chemotherapy's intent to reduce risk of recurrence in the adjuvant or perioperative setting.  We reviewed that the standard of care remains surgical resection followed by 6 months of adjuvant chemotherapy, but in recent years our practice and others worldwide have tended to do more therapy in the upfront setting.  The advantages of this would be to ensure that at least some chemotherapy treatment is given in the event that the patient has a prolonged and difficult postoperative recovery, also with the opportunity to potentially improve the conversion to full surgical resectability, and also to try and eradicate micrometastatic disease.  The patient stated verbal consent for moving forward with getting a port placed, and moving forward with FOLFIRINOX as neoadjuvant-intent therapy.  We will move forward with 4 cycles, and have the patient get a CT chest, abdomen and pelvis, and CA 19-9 following the fourth cycle.  She will see Kellie Nobles from our physician assistant team for the first several cycles to ensure she is doing well.  We reviewed all of the above  to the best of our ability.  Mainly, the patient's questions focused on logistics of surgery, and I will defer those questions to Dr. Ja Byrd.      Thank you very much for this kind referral.      I spent greater than 60 minutes in consultation, including history and physical, and 45 minutes in discussion.  Over 50% of the time was spent in counseling and coordination of care.  I spent 20 minutes prior to the visit reviewing the patient's medical records, images, imaging reports, outside clinical records and lab values.       I spent 60 minutes in consultation, including H&P and Discussion. >50% of time was spent in counseling and in coordination of care.    Jovany Monk MD, PhD         cc: MD Jamey Keys MD Guru Trikudanathan, MD Jason Denbo, MD

## 2018-01-08 NOTE — PROGRESS NOTES
[facility] - Surgical Oncology    NEW CONSULTATION  Jan 8, 2018    Kitty Bangura is a 57 year old female who presents with pancreatic adenocarcinoma diagnosed during a bout of acute necrotizing pancreatitis. She was referred by Dr Metz.    HISTORY OF PRESENT ILLNESS:  She developed severe abdominal pain and was found to have severe pancreatitis. She had evidence of pancreatic necrosis and required multiple endoscopic debridement procedures by Dr. Metz. Her last endoscopic procedure was approximately 1 month ago. She reports that she has recovered quite well. She is tolerating a regular diet and occasionally has upper abdominal bloating and discomfort. She is maintaining her weight and has no current complaints. During Dr. Metz's index endoscopic debridement procedure he also used EUS to further evaluate this cystic and solid mass in the tail of the pancreas and performed an FNA. Pathology returned with adenocarcinoma.  On further questioning, the patient does report that she had an MRI for back pain when she had disc issues in 2004 and was told there was a cyst on the pancreas at that time.  She never had any follow-up imaging.    She had a CT scan at the end of December and Dr. Metz was pleased with the resolution of her necrosis in the appearance of the pancreas. Thus, she was referred to me and Dr. Jovany Monk.     Past Medical History:   Diagnosis Date     Cancer (H)      Necrotizing pancreatitis      PONV (postoperative nausea and vomiting)        Past Surgical History:   Procedure Laterality Date     ABDOMEN SURGERY  1983    Ruptured tubal pregnancies, additional surgeries followed     BACK SURGERY  2004    L5, S1 discectomy     BREAST SURGERY  2003    Breast reduction     COLONOSCOPY  2008     ENDOSCOPIC ULTRASOUND, ESOPHAGOSCOPY, GASTROSCOPY, DUODENOSCOPY (EGD), NECROSECTOMY N/A 12/4/2017    Procedure: ENDOSCOPIC ULTRASOUND, ESOPHAGOSCOPY, GASTROSCOPY, DUODENOSCOPY (EGD),  NECROSECTOMY;  Esophagogastroduodenoscopy, with  Necrosectomy and stents replacement  ;  Surgeon: González Metz MD;  Location: [facility] OR     ENDOSCOPIC ULTRASOUND, ESOPHAGOSCOPY, GASTROSCOPY, DUODENOSCOPY (EGD), NECROSECTOMY N/A 2017    Procedure: ENDOSCOPIC ULTRASOUND, ESOPHAGOSCOPY, GASTROSCOPY, DUODENOSCOPY (EGD), NECROSECTOMY;  Esophagogastroduodenoscopy with Necrosectomy, Balloon Dilation, stent removal X3 and Cystgastrostomy stent Placement X2;  Surgeon: Guru Cecilia Staples MD;  Location: [facility] OR     ESOPHAGOSCOPY, GASTROSCOPY, DUODENOSCOPY (EGD), COMBINED N/A 2017    Procedure: COMBINED ENDOSCOPIC ULTRASOUND, ESOPHAGOSCOPY, GASTROSCOPY, DUODENOSCOPY (EGD), FINE NEEDLE ASPIRATE/BIOPSY;  Endoscopic Ultrasound with cystgastrostomy and fine needle aspiration ;  Surgeon: González Metz MD;  Location: [facility] OR     GYN SURGERY      Multiple ectopic pregnancies, reconnect,         Current Outpatient Prescriptions   Medication Sig Dispense Refill     amylase-lipase-protease (CREON) 19939-15385 UNITS CPEP per EC capsule Take 2-3 with meals / 1-2 with snacks, up to 15 per day. 450 capsule 6     acetaminophen (TYLENOL) 325 MG tablet Take 2 tablets (650 mg) by mouth every 4 hours as needed for mild pain (Patient not taking: Reported on 2018) 100 tablet          No Known Allergies     SOCIAL HISTORY:   reports that she quit smoking about 37 years ago. Her smoking use included Cigarettes. She started smoking about 44 years ago. She smoked 0.50 packs per day. She has never used smokeless tobacco. She reports that she does not drink alcohol or use illicit drugs.    FAMILY HISTORY:  Family History   Problem Relation Age of Onset     HEART DISEASE Father      Hyperlipidemia Father      HEART DISEASE Brother      DIABETES Mother      Hypertension Mother      Obesity Mother      DIABETES Maternal Grandfather      Hypertension Maternal Grandfather      Obesity Maternal  "Grandfather      DIABETES Sister      Hypertension Sister      Depression Sister      Anxiety Disorder Sister      Obesity Sister      Hypertension Brother      Hyperlipidemia Brother      Breast Cancer Cousin      Breast Cancer Cousin      Breast Cancer Cousin      Colon Cancer Other      Other Cancer Other      Mesothelioma     Depression Sister      Anxiety Disorder Brother      Anxiety Disorder Son      MENTAL ILLNESS Sister      Schizophrenia     Obesity Maternal Grandmother      Obesity Sister        ROS  A full 14-point review of systems was performed, and the pertinent positives and negatives were mentioned in the history of present illness.    /80 (BP Location: Right arm, Patient Position: Sitting, Cuff Size: Adult Regular)  Pulse 61  Temp 97.4  F (36.3  C) (Oral)  Ht 1.651 m (5' 5\")  Wt 76.5 kg (168 lb 11.2 oz)  SpO2 98%  BMI 28.07 kg/m2   Physical Exam  General: No acute distress.  Head: Anicteric sclera.  Neck: No cervical or supraclavicular adenopathy.  Pulmonary: Bilateral chest rise.  Cardiac: Regular rate and rhythm.  Abdomen: Pfannenstiel scar, soft, nondistended, mild discomfort to deep palpation in the epigastrium. No masses or organomegaly appreciated.  Extremities: No lower extremity edema.    INVESTIGATIONS:  Labs: Were reviewed from December.  No CA-19-9 has been drawn but this is been ordered by the medical oncologist.    CT (November and December 2017): At her index presentation there was extensive fluid collection and area of necrosis within the lesser sac.  She underwent a series of endoscopic debridements and drainage procedures.  On her most recent CT scan at the end of December there are no remaining fluid collections.  Although, there is still extensive inflammatory changes within the lesser sac also extending into the transverse mesocolon posterior to the stomach.  Did not see any evidence of metastatic disease.  She does have liver lesions that were previously evaluated " with an MRI consistent with a hemangioma.  The 3 cm mass within the tail the pancreas, which is biopsy-proven adenocarcinoma, appears to have some cystic and solid components.  All of her mesenteric vessels appear patent and uninvolved.    Biopsy (December 2017): Adenocarcinoma.    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    The natural history of pancreatic adenocarcinoma and pancreatitis were discussed with the patient and her .  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  I explained that this lesion appears to be resectable.  The complicating factor in her case is the fact that she is recovering from a recent bout of necrotizing pancreatitis.  There is undoubtedly significant inflammatory disease within the lesser sac in the retroperitoneum.  I explained that surgery at the current time would be quite difficult and likely have increased risks of bleeding, etc. given the pancreatitis and inflammation.  I explained that I think is most prudent to proceed with some neoadjuvant chemotherapy with repeat imaging in 2-3 months, and reassessment for surgical resection at that time.  I also explained the additional potential benefits of neoadjuvant chemotherapy.  I briefly discussed the procedure that would be required which is a distal pancreatectomy and splenectomy and some of the most common short-term and long-term complications.  We will plan to see her back in 2-3 months in conjunction with Dr. oMnk with repeat staging studies.    Total time spent with the patient was 60 minutes, of which more than half was counseling.      Ja Byrd MD    Division of Surgical Oncology  [facility]

## 2018-01-09 RX ORDER — IOPAMIDOL 755 MG/ML
82 INJECTION, SOLUTION INTRAVASCULAR ONCE
Status: COMPLETED | OUTPATIENT
Start: 2018-01-10 | End: 2018-01-10

## 2018-01-10 ENCOUNTER — HOSPITAL ENCOUNTER (OUTPATIENT)
Dept: CT IMAGING | Facility: CLINIC | Age: 58
Discharge: HOME | End: 2018-01-10
Attending: FAMILY MEDICINE | Admitting: FAMILY MEDICINE
Payer: COMMERCIAL

## 2018-01-10 ENCOUNTER — TELEPHONE (OUTPATIENT)
Dept: ONCOLOGY | Facility: CLINIC | Age: 58
End: 2018-01-10

## 2018-01-10 DIAGNOSIS — C25.9 PANCREATIC ADENOCARCINOMA (H): ICD-10-CM

## 2018-01-10 PROCEDURE — 25000125 ZZHC RX 250: Performed by: RADIOLOGY

## 2018-01-10 PROCEDURE — 71260 CT THORAX DX C+: CPT

## 2018-01-10 PROCEDURE — 25000128 H RX IP 250 OP 636: Performed by: RADIOLOGY

## 2018-01-10 RX ADMIN — SODIUM CHLORIDE 61 ML: 9 INJECTION, SOLUTION INTRAVENOUS at 10:25

## 2018-01-10 RX ADMIN — IOPAMIDOL 82 ML: 755 INJECTION, SOLUTION INTRAVENOUS at 10:25

## 2018-01-10 NOTE — TELEPHONE ENCOUNTER
Oncology Distress Screening   Clinical Nutrition  [facility]     Identified Concern and Score From Distress Screening: Request to speak with a dietitian      Date of Distress Screenin18     Data: necrotizing pancreatitis     Intervention: Called pt today.  Left message indicating reason for the phone call.  Scheduled pt to see RD on 18 infusion to reserve a spot for her.  Advised her to return call if she does not want this appointment and/or would prefer to talk over phone.          Shivani Noel RD, LD  Oncology Dietitian  [phone]  Pager: [phone]

## 2018-01-11 ENCOUNTER — ANESTHESIA EVENT (OUTPATIENT)
Dept: SURGERY | Facility: AMBULATORY SURGERY CENTER | Age: 58
End: 2018-01-11

## 2018-01-12 ENCOUNTER — ANESTHESIA (OUTPATIENT)
Dept: SURGERY | Facility: AMBULATORY SURGERY CENTER | Age: 58
End: 2018-01-12

## 2018-01-12 ENCOUNTER — SURGERY (OUTPATIENT)
Age: 58
End: 2018-01-12

## 2018-01-12 ENCOUNTER — RADIANT APPOINTMENT (OUTPATIENT)
Dept: RADIOLOGY | Facility: AMBULATORY SURGERY CENTER | Age: 58
End: 2018-01-12
Payer: COMMERCIAL

## 2018-01-12 ENCOUNTER — HOSPITAL ENCOUNTER (OUTPATIENT)
Facility: AMBULATORY SURGERY CENTER | Age: 58
End: 2018-01-12
Attending: PHYSICIAN ASSISTANT
Payer: COMMERCIAL

## 2018-01-12 VITALS
WEIGHT: 168 LBS | RESPIRATION RATE: 16 BRPM | OXYGEN SATURATION: 97 % | DIASTOLIC BLOOD PRESSURE: 60 MMHG | BODY MASS INDEX: 27.99 KG/M2 | TEMPERATURE: 97.6 F | HEIGHT: 65 IN | SYSTOLIC BLOOD PRESSURE: 95 MMHG

## 2018-01-12 DIAGNOSIS — C25.9 PANCREATIC ADENOCARCINOMA (H): ICD-10-CM

## 2018-01-12 LAB — INR PPP: 1.01 (ref 0.86–1.14)

## 2018-01-12 DEVICE — CATH PORT POWERPORT CLEARVUE SLIM 6FR 5616000: Type: IMPLANTABLE DEVICE | Site: CHEST  WALL | Status: FUNCTIONAL

## 2018-01-12 RX ORDER — ONDANSETRON 2 MG/ML
INJECTION INTRAMUSCULAR; INTRAVENOUS PRN
Status: DISCONTINUED | OUTPATIENT
Start: 2018-01-12 | End: 2018-01-12

## 2018-01-12 RX ORDER — ALBUTEROL SULFATE 0.83 MG/ML
2.5 SOLUTION RESPIRATORY (INHALATION)
Status: CANCELLED | OUTPATIENT
Start: 2018-01-15

## 2018-01-12 RX ORDER — ONDANSETRON 2 MG/ML
4 INJECTION INTRAMUSCULAR; INTRAVENOUS EVERY 30 MIN PRN
Status: DISCONTINUED | OUTPATIENT
Start: 2018-01-12 | End: 2018-01-13 | Stop reason: HOSPADM

## 2018-01-12 RX ORDER — HEPARIN SODIUM,PORCINE 10 UNIT/ML
5 VIAL (ML) INTRAVENOUS EVERY 24 HOURS
Status: DISCONTINUED | OUTPATIENT
Start: 2018-01-12 | End: 2018-01-13 | Stop reason: HOSPADM

## 2018-01-12 RX ORDER — METHYLPREDNISOLONE SODIUM SUCCINATE 125 MG/2ML
125 INJECTION, POWDER, LYOPHILIZED, FOR SOLUTION INTRAMUSCULAR; INTRAVENOUS
Status: CANCELLED
Start: 2018-01-15

## 2018-01-12 RX ORDER — ACETAMINOPHEN 325 MG/1
975 TABLET ORAL ONCE
Status: DISCONTINUED | OUTPATIENT
Start: 2018-01-12 | End: 2018-01-13 | Stop reason: HOSPADM

## 2018-01-12 RX ORDER — HEPARIN SODIUM (PORCINE) LOCK FLUSH IV SOLN 100 UNIT/ML 100 UNIT/ML
5 SOLUTION INTRAVENOUS
Status: DISCONTINUED | OUTPATIENT
Start: 2018-01-12 | End: 2018-01-13 | Stop reason: HOSPADM

## 2018-01-12 RX ORDER — NALOXONE HYDROCHLORIDE 0.4 MG/ML
.1-.4 INJECTION, SOLUTION INTRAMUSCULAR; INTRAVENOUS; SUBCUTANEOUS
Status: DISCONTINUED | OUTPATIENT
Start: 2018-01-12 | End: 2018-01-13 | Stop reason: HOSPADM

## 2018-01-12 RX ORDER — LIDOCAINE HYDROCHLORIDE 20 MG/ML
INJECTION, SOLUTION INFILTRATION; PERINEURAL PRN
Status: DISCONTINUED | OUTPATIENT
Start: 2018-01-12 | End: 2018-01-12

## 2018-01-12 RX ORDER — EPINEPHRINE 1 MG/ML
0.3 INJECTION, SOLUTION, CONCENTRATE INTRAVENOUS EVERY 5 MIN PRN
Status: CANCELLED | OUTPATIENT
Start: 2018-01-15

## 2018-01-12 RX ORDER — DIPHENHYDRAMINE HYDROCHLORIDE 50 MG/ML
50 INJECTION INTRAMUSCULAR; INTRAVENOUS
Status: CANCELLED
Start: 2018-01-15

## 2018-01-12 RX ORDER — SODIUM CHLORIDE, SODIUM LACTATE, POTASSIUM CHLORIDE, CALCIUM CHLORIDE 600; 310; 30; 20 MG/100ML; MG/100ML; MG/100ML; MG/100ML
INJECTION, SOLUTION INTRAVENOUS CONTINUOUS
Status: DISCONTINUED | OUTPATIENT
Start: 2018-01-12 | End: 2018-01-13 | Stop reason: HOSPADM

## 2018-01-12 RX ORDER — GABAPENTIN 300 MG/1
300 CAPSULE ORAL ONCE
Status: COMPLETED | OUTPATIENT
Start: 2018-01-12 | End: 2018-01-12

## 2018-01-12 RX ORDER — PALONOSETRON 0.05 MG/ML
0.25 INJECTION, SOLUTION INTRAVENOUS ONCE
Status: CANCELLED
Start: 2018-01-15

## 2018-01-12 RX ORDER — HEPARIN SODIUM (PORCINE) LOCK FLUSH IV SOLN 100 UNIT/ML 100 UNIT/ML
5 SOLUTION INTRAVENOUS ONCE
Status: DISCONTINUED | OUTPATIENT
Start: 2018-01-12 | End: 2018-01-13 | Stop reason: HOSPADM

## 2018-01-12 RX ORDER — EPINEPHRINE 0.3 MG/.3ML
0.3 INJECTION SUBCUTANEOUS EVERY 5 MIN PRN
Status: CANCELLED | OUTPATIENT
Start: 2018-01-15

## 2018-01-12 RX ORDER — SODIUM CHLORIDE 9 MG/ML
1000 INJECTION, SOLUTION INTRAVENOUS CONTINUOUS PRN
Status: CANCELLED
Start: 2018-01-15

## 2018-01-12 RX ORDER — FLUOROURACIL 50 MG/ML
400 INJECTION, SOLUTION INTRAVENOUS ONCE
Status: CANCELLED | OUTPATIENT
Start: 2018-01-15

## 2018-01-12 RX ORDER — LIDOCAINE 40 MG/G
CREAM TOPICAL
Status: DISCONTINUED | OUTPATIENT
Start: 2018-01-12 | End: 2018-01-13 | Stop reason: HOSPADM

## 2018-01-12 RX ORDER — ALBUTEROL SULFATE 90 UG/1
1-2 AEROSOL, METERED RESPIRATORY (INHALATION)
Status: CANCELLED
Start: 2018-01-15

## 2018-01-12 RX ORDER — ONDANSETRON 4 MG/1
4 TABLET, ORALLY DISINTEGRATING ORAL EVERY 30 MIN PRN
Status: DISCONTINUED | OUTPATIENT
Start: 2018-01-12 | End: 2018-01-13 | Stop reason: HOSPADM

## 2018-01-12 RX ORDER — HYDROCODONE BITARTRATE AND ACETAMINOPHEN 5; 325 MG/1; MG/1
1 TABLET ORAL EVERY 6 HOURS PRN
Status: ON HOLD | COMMUNITY
End: 2018-01-27

## 2018-01-12 RX ORDER — FENTANYL CITRATE 50 UG/ML
INJECTION, SOLUTION INTRAMUSCULAR; INTRAVENOUS PRN
Status: DISCONTINUED | OUTPATIENT
Start: 2018-01-12 | End: 2018-01-12

## 2018-01-12 RX ORDER — LORAZEPAM 2 MG/ML
0.5 INJECTION INTRAMUSCULAR EVERY 4 HOURS PRN
Status: CANCELLED
Start: 2018-01-15

## 2018-01-12 RX ORDER — PROPOFOL 10 MG/ML
INJECTION, EMULSION INTRAVENOUS CONTINUOUS PRN
Status: DISCONTINUED | OUTPATIENT
Start: 2018-01-12 | End: 2018-01-12

## 2018-01-12 RX ORDER — SODIUM CHLORIDE 9 MG/ML
INJECTION, SOLUTION INTRAVENOUS CONTINUOUS
Status: DISCONTINUED | OUTPATIENT
Start: 2018-01-12 | End: 2018-01-13 | Stop reason: HOSPADM

## 2018-01-12 RX ORDER — MEPERIDINE HYDROCHLORIDE 25 MG/ML
25 INJECTION INTRAMUSCULAR; INTRAVENOUS; SUBCUTANEOUS EVERY 30 MIN PRN
Status: CANCELLED | OUTPATIENT
Start: 2018-01-15

## 2018-01-12 RX ADMIN — ONDANSETRON 4 MG: 2 INJECTION INTRAMUSCULAR; INTRAVENOUS at 09:40

## 2018-01-12 RX ADMIN — SODIUM CHLORIDE, SODIUM LACTATE, POTASSIUM CHLORIDE, CALCIUM CHLORIDE: 600; 310; 30; 20 INJECTION, SOLUTION INTRAVENOUS at 08:42

## 2018-01-12 RX ADMIN — LIDOCAINE HYDROCHLORIDE 100 MG: 20 INJECTION, SOLUTION INFILTRATION; PERINEURAL at 09:40

## 2018-01-12 RX ADMIN — GABAPENTIN 300 MG: 300 CAPSULE ORAL at 08:39

## 2018-01-12 RX ADMIN — PROPOFOL 100 MCG/KG/MIN: 10 INJECTION, EMULSION INTRAVENOUS at 09:42

## 2018-01-12 RX ADMIN — Medication 9 ML: at 10:16

## 2018-01-12 RX ADMIN — FENTANYL CITRATE 50 MCG: 50 INJECTION, SOLUTION INTRAMUSCULAR; INTRAVENOUS at 09:45

## 2018-01-12 NOTE — ANESTHESIA CARE TRANSFER NOTE
Patient: Kitty Bangura    Procedure(s):  Chest Port Placement - right - Wound Class: I-Clean    Diagnosis: Pancreatic Adenocarcinoma  Diagnosis Additional Information: No value filed.    Anesthesia Type:   MAC     Note:  Airway :Room Air  Patient transferred to:Phase II  Comments: Uneventful transport to Phase II: VSS; IV patent; pt comfortable; Report given to RN; pt. Responds appropriately to commandsHandoff Report: Identifed the Patient, Identified the Reponsible Provider, Reviewed the pertinent medical history, Discussed the surgical course, Reviewed Intra-OP anesthesia mangement and issues during anesthesia, Set expectations for post-procedure period and Allowed opportunity for questions and acknowledgement of understanding      Vitals: (Last set prior to Anesthesia Care Transfer)    CRNA VITALS  1/12/2018 1002 - 1/12/2018 1032      1/12/2018             NIBP: (!)  88/56    NIBP Mean: 63    SpO2: 99 %    Resp Rate (set): 10                Electronically Signed By: TYLER Allred CRNA  January 12, 2018  10:32 AM

## 2018-01-12 NOTE — ANESTHESIA PREPROCEDURE EVALUATION
Anesthesia Evaluation     . Pt has had prior anesthetic. Type: General    No history of anesthetic complications          ROS/MED HX    ENT/Pulmonary:  - neg pulmonary ROS     Neurologic:  - neg neurologic ROS     Cardiovascular:  - neg cardiovascular ROS       METS/Exercise Tolerance:  >4 METS   Hematologic:         Musculoskeletal:  - neg musculoskeletal ROS       GI/Hepatic: Comment: Necrotizing pancreatitis        Renal/Genitourinary:  - ROS Renal section negative       Endo:  - neg endo ROS       Psychiatric:         Infectious Disease:         Malignancy:         Other:                     Physical Exam  Normal systems: pulmonary and dental    Airway   Mallampati: I  TM distance: >3 FB  Neck ROM: full    Dental     Cardiovascular   Rhythm and rate: regular and normal      Pulmonary                         Anesthesia Plan      History & Physical Review  History and physical reviewed and following examination; no interval change.    ASA Status:  2 .    NPO Status:  > 8 hours    Plan for MAC Reason for MAC:  Deep or markedly invasive procedure (G8)         Postoperative Care      Consents  Anesthetic plan, risks, benefits and alternatives discussed with:  Patient..                          .

## 2018-01-12 NOTE — IP AVS SNAPSHOT
[facility]    [address] [place] [id number]    Phone:  [phone]    Fax:  [phone]                                       After Visit Summary   1/12/2018    Kitty Bangura    MRN: [id number]           After Visit Summary Signature Page     I have received my discharge instructions, and my questions have been answered. I have discussed any challenges I see with this plan with the nurse or doctor.    ..........................................................................................................................................  Patient/Patient Representative Signature      ..........................................................................................................................................  Patient Representative Print Name and Relationship to Patient    ..................................................               ................................................  Date                                            Time    ..........................................................................................................................................  Reviewed by Signature/Title    ...................................................              ..............................................  Date                                                            Time

## 2018-01-12 NOTE — DISCHARGE INSTRUCTIONS
A collaboration between [facility] and [facility]  Experts in minimally invasive, targeted treatments performed using imaging guidance    Venous Access Device,  Port Catheter or Tunneled Central Line Placement    Today you had a procedure today to install a venous access device; either a tunneled central vein catheter or a subcutaneous port catheter.    One of our Radiology PAs performed this procedure for you today:    ? Chanda Lawson PA-C     After you go home:  - Drink plenty of fluids.  Generally 6-8 (8 ounce) glasses a day is recommended.  - Resume your regular diet unless otherwise ordered by a medical provider.  - Keep any applied tape/gauze dressings clean and dry.  Change tape/gauze dressings if they get wet or soiled.  - You may shower the following day after procedure, however cover and protect from moisture any tape/gauze dressings.  You may let water hit and run over dried skin glue, but do not scrub.  Pat the area dry after showering.  - Port placement incisions are closed with absorbable suture, meaning they do not need to be removed at a later date, and a topical skin adhesive (skin glue).  This glue will wear off in 7-14 days.  Do not remove before this time.  If 14 days have passed and residual glue is present, you may gently remove it.  - Do not apply gels, lotions, or ointments to the glue site for the first 10 days as this may cause the glue to prematurely soften and fail.  - Do not perform strenuous activities or lift greater than 10 pounds for the next three days.  - If there is bleeding or oozing from the procedure site, apply firm pressure to the area for 5-10 minutes.  If the bleeding continues seek medical advice at the numbers below.  - Mild procedure site discomfort can be treated with an ice pack and over-the-counter pain relievers.        For 24 hours after any sedation used:  - Relax and take it easy.  No strenuous activities.  - Do  not drive or operate machines at home or at work.  - No alcohol consumption.  - Do not make any important or legal decisions.    Call our Interventional Radiology (IR) service if:  - If you start bleeding from the procedure site.  If you do start to bleed from the site, lie down and hold some pressure on the site.  Our radiology provider can help you decide if you need to return to the hospital.  - If you have new or worsening pain related to the procedure.  - If you have concerning swelling at the procedure site.  - If you develop persistent nausea or vomiting.  - If you develop hives or a rash or any unexplained itching.  - If you have a fever of greater than 100.5  F and chills in the first 5 days after procedure.  - Any other concerns related to your procedure.      [facility]  Interventional Radiology (IR)  [address] [place] [address]    Contact Number:  [phone]  (IR control desk)  - Monday - Friday 8:00 am - 4:30 pm    After hours for urgent concerns:  [phone]  - After 4:30 pm Monday - Friday, Weekends and Holidays.   - Ask for Interventional Radiology on-call.  Someone is available 24 hours a day.  - [facility] toll free number:  [phone]

## 2018-01-12 NOTE — PROCEDURES
Interventional Radiology Brief Post Procedure Note    Procedure: Chest Port Placement    Proceduralist: Chanda Lawson MS, PA-C    Assistant: None    Time Out: Prior to the start of the procedure and with procedural staff participation, I verbally confirmed the patient s identity using two indicators, relevant allergies, that the procedure was appropriate and matched the consent or emergent situation, and that the correct equipment/implants were available. Immediately prior to starting the procedure I conducted the Time Out with the procedural staff and re-confirmed the patient s name, procedure, and site/side. (The Joint Commission universal protocol was followed.)  Yes    Sedation: Monitored Anesthesia Care (MAC) administered and documented by Anesthesia Care Provider    Findings: Completed image-guided placement of 6 [place] 25 cm single lumen power-injectable central venous chest port via right IJ. Aspirates and flushes freely, heparin locked and is ready for immediate use.    Estimated Blood Loss: Minimal    Fluoroscopy Time: 0.2 minute(s)    SPECIMENS: None    Complications: 1. None     Condition: Stable    Plan: Ready for immediate use. Follow up per primary team. Return for removal as indicated.     Comments: See dictated procedure note for full details.    Chanda Lawson PA-C

## 2018-01-12 NOTE — ANESTHESIA POSTPROCEDURE EVALUATION
Patient: Kitty Bangura    Procedure(s):  Chest Port Placement - right - Wound Class: I-Clean    Diagnosis:Pancreatic Adenocarcinoma  Diagnosis Additional Information: No value filed.    Anesthesia Type:  MAC    Note:  Anesthesia Post Evaluation    Patient location during evaluation: Phase 2  Patient participation: Able to fully participate in evaluation  Level of consciousness: awake and alert  Pain management: adequate  Airway patency: patent  Cardiovascular status: acceptable  Respiratory status: acceptable  Hydration status: acceptable  PONV: none     Anesthetic complications: None          Last vitals:  Vitals:    01/12/18 0744 01/12/18 1035 01/12/18 1050   BP: 95/64 91/57 95/60   Resp: 18 18 16   Temp: 36.2  C (97.2  F) 36.6  C (97.8  F) 36.4  C (97.6  F)   SpO2: 96% 98% 97%         Electronically Signed By: Tiffanie Tanner MD  January 12, 2018  10:52 AM

## 2018-01-12 NOTE — IP AVS SNAPSHOT
MRN:[id number]                      After Visit Summary   1/12/2018    Kitty Bangura    MRN: [id number]           Thank you!     Thank you for choosing [facility] for your care. Our goal is always to provide you with excellent care. Hearing back from our patients is one way we can continue to improve our services. Please take a few minutes to complete the written survey that you may receive in the mail after you visit with us. Thank you!        Patient Information     Date Of Birth          1960        About your hospital stay     You were admitted on:  January 12, 2018 You last received care in the:  [facility]    You were discharged on:  January 12, 2018       Who to Call     For medical emergencies, please call 911.  For non-urgent questions about your medical care, please call your primary care provider or clinic, [phone]  For questions related to your surgery, please call your surgery clinic        Attending Provider     Provider Specialty    Chanda Lawson PA-C Physician Assistant       Primary Care Provider Office Phone # Fax #    Williamsin Julito Mcgill [phone]      Your next 10 appointments already scheduled     Darryl 15, 2018  8:00 AM CST   [facility] Lab Draw with  MAS[facility]IC LAB DRAW   [facility] Lab Draw ([facility])    [address] [place] [address]  [address] [place] [id number] [phone]            Darryl 15, 2018  8:30 AM CST   (Arrive by 8:15 AM)   Return Visit with Renate Mcnair PA-C   [facility] ([facility])    [address] [place] [address]  [address] [place] [address]   [phone]            Darryl 15, 2018 10:00 AM CST   Infusion 360 with  ONCOLOGY INFUSION, UC 22 ATC   [facility] ([facility])    [address] [place] [address]  [address] [place] [address]   [phone]            Jan 29, 2018  8:15  AM CST   [facility]ic Lab Draw with  MASONIC LAB DRAW   [facility] Masonic Lab Draw ([facility])    [address]  [address]  [facility] [address]   [phone]            Jan 29, 2018  8:40 AM CST   (Arrive by 8:25 AM)   Return Visit with Kellie Nobles PA-C   [facility] ([facility])    [address]  [address]  [facility] [address]   [phone]            Jan 29, 2018  9:00 AM CST   Infusion 360 with  ONCOLOGY INFUSION, UC 26 ATC   [facility] ([facility])    [address]  [address]  [facility] [address]   [phone]            Jan 29, 2018 11:00 AM CST   New Patient Visit with Shivani Noel RD   [facility] ([facility])    [address]  [address]  [facility] [address]   [phone]            Feb 12, 2018  9:15 AM CST   [facility] Lab Draw with  MASONIC LAB DRAW   [facility] Masonic Lab Draw ([facility])    [address]  [address]  [facility] [address]   [phone]              Further instructions from your care team         A collaboration between [facility] and [facility]  Experts in minimally invasive, targeted treatments performed using imaging guidance    Venous Access Device,  Port Catheter or Tunneled Central Line Placement    Today you had a procedure today to install a venous access device; either a tunneled central vein catheter or a subcutaneous port catheter.    One of our Radiology PAs performed this procedure for you today:    ? Chanda Lawson PA-C     After you go home:  - Drink plenty of fluids.  Generally 6-8 (8 ounce) glasses a day is recommended.  - Resume your regular diet unless otherwise ordered by a medical provider.  - Keep any applied tape/gauze dressings clean and dry.   Change tape/gauze dressings if they get wet or soiled.  - You may shower the following day after procedure, however cover and protect from moisture any tape/gauze dressings.  You may let water hit and run over dried skin glue, but do not scrub.  Pat the area dry after showering.  - Port placement incisions are closed with absorbable suture, meaning they do not need to be removed at a later date, and a topical skin adhesive (skin glue).  This glue will wear off in 7-14 days.  Do not remove before this time.  If 14 days have passed and residual glue is present, you may gently remove it.  - Do not apply gels, lotions, or ointments to the glue site for the first 10 days as this may cause the glue to prematurely soften and fail.  - Do not perform strenuous activities or lift greater than 10 pounds for the next three days.  - If there is bleeding or oozing from the procedure site, apply firm pressure to the area for 5-10 minutes.  If the bleeding continues seek medical advice at the numbers below.  - Mild procedure site discomfort can be treated with an ice pack and over-the-counter pain relievers.        For 24 hours after any sedation used:  - Relax and take it easy.  No strenuous activities.  - Do not drive or operate machines at home or at work.  - No alcohol consumption.  - Do not make any important or legal decisions.    Call our Interventional Radiology (IR) service if:  - If you start bleeding from the procedure site.  If you do start to bleed from the site, lie down and hold some pressure on the site.  Our radiology provider can help you decide if you need to return to the hospital.  - If you have new or worsening pain related to the procedure.  - If you have concerning swelling at the procedure site.  - If you develop persistent nausea or vomiting.  - If you develop hives or a rash or any unexplained itching.  - If you have a fever of greater than 100.5  F and chills in the first 5 days after procedure.  - Any  "other concerns related to your procedure.      [facility]  Interventional Radiology (IR)  [address]  2nd Floor, [place] [address]    Contact Number:  [phone]  (IR control desk)  - Monday - Friday 8:00 am - 4:30 pm    After hours for urgent concerns:  [phone]  - After 4:30 pm Monday - Friday, Weekends and Holidays.   - Ask for Interventional Radiology on-call.  Someone is available 24 hours a day.  - [facility] toll free number:  [phone]        Pending Results     Date and Time Order Name Status Description    1/12/2018 0910 IR CHEST PORT PLACEMENT > 5 YRS OF AGE In process             Admission Information     Date & Time Provider Department Dept. Phone    1/12/2018 Chanda Lawson PA-C [facility] [phone]      Your Vitals Were     Blood Pressure Temperature Respirations Height Weight Pulse Oximetry    95/64 (Cuff Size: Adult Regular) 97.2  F (36.2  C) (Oral) 18 1.651 m (5' 5\") 76.2 kg (168 lb) 96%    BMI (Body Mass Index)                   27.96 kg/m2           V[employer] Information     [employer] gives you secure access to your electronic health record. If you see a primary care provider, you can also send messages to your care team and make appointments. If you have questions, please call your primary care clinic.  If you do not have a primary care provider, please call [phone] and they will assist you.      V[employer]ound is an electronic gateway that provides easy, online access to your medical records. With [employer], you can request a clinic appointment, read your test results, renew a prescription or communicate with your care team.     To access your existing account, please contact your [facility] or call [phone] for assistance.        Care EveryWhere ID     This is your Care EveryWhere ID. This could be used by other organizations to access your [facility] " records  [id number]        Equal Access to Services     CHASE GEGE : Hadii zaheer guerrero venus Sotrinaali, waaxda luqadaha, qaybta kaelderfredy banerjeedavidfredy, re calderafrancoisenicolás yip. So [facility] [phone].    ATENCIÓN: Si habla español, tiene a quiros disposición servicios gratuitos de asistencia lingüística. Llame al [phone].    We comply with applicable federal civil rights laws and [place] laws. We do not discriminate on the basis of race, color, national origin, age, disability, sex, sexual orientation, or gender identity.               Review of your medicines      UNREVIEWED medicines. Ask your doctor about these medicines        Dose / Directions    acetaminophen 325 MG tablet   Commonly known as:  TYLENOL   Used for:  Acute pancreatitis without infection or necrosis, unspecified pancreatitis type        Dose:  650 mg   Take 2 tablets (650 mg) by mouth every 4 hours as needed for mild pain   Quantity:  100 tablet   Refills:  0       amylase-lipase-protease [id number] UNITS Cpep per EC capsule   Commonly known as:  CREON   Used for:  Necrotizing pancreatitis        Take 2-3 with meals / 1-2 with snacks, up to 15 per day.   Quantity:  450 capsule   Refills:  6       HYDROcodone-acetaminophen 5-325 MG per tablet   Commonly known as:  NORCO        Dose:  1 tablet   Take 1 tablet by mouth every 6 hours as needed for moderate to severe pain   Refills:  0                Protect others around you: Learn how to safely use, store and throw away your medicines at www.disposemymeds.org.             Medication List: This is a list of all your medications and when to take them. Check marks below indicate your daily home schedule. Keep this list as a reference.      Medications           Morning Afternoon Evening Bedtime As Needed    acetaminophen 325 MG tablet   Commonly known as:  TYLENOL   Take 2 tablets (650 mg) by mouth every 4 hours as needed for mild pain                                 amylase-lipase-protease [id number] UNITS Cpep per EC capsule   Commonly known as:  CREON   Take 2-3 with meals / 1-2 with snacks, up to 15 per day.                                HYDROcodone-acetaminophen 5-325 MG per tablet   Commonly known as:  NORCO   Take 1 tablet by mouth every 6 hours as needed for moderate to severe pain

## 2018-01-13 ASSESSMENT — ENCOUNTER SYMPTOMS
TINGLING: 1
ALTERED TEMPERATURE REGULATION: 1
ORTHOPNEA: 0
NECK MASS: 0
SPEECH CHANGE: 0
DIARRHEA: 0
BLOATING: 1
SYNCOPE: 0
LOSS OF CONSCIOUSNESS: 0
DEPRESSION: 0
WEIGHT LOSS: 1
POLYPHAGIA: 0
PARALYSIS: 0
BLOOD IN STOOL: 0
FATIGUE: 0
ABDOMINAL PAIN: 1
TREMORS: 0
TASTE DISTURBANCE: 0
NERVOUS/ANXIOUS: 0
DOUBLE VISION: 0
SINUS PAIN: 0
TROUBLE SWALLOWING: 0
DECREASED APPETITE: 0
CONSTIPATION: 1
LEG PAIN: 0
FEVER: 0
EYE PAIN: 0
SORE THROAT: 0
EYE REDNESS: 0
HEARTBURN: 0
INSOMNIA: 1
HALLUCINATIONS: 0
NIGHT SWEATS: 0
WEIGHT GAIN: 0
EYE IRRITATION: 0
MEMORY LOSS: 0
HOARSE VOICE: 0
NUMBNESS: 1
LIGHT-HEADEDNESS: 1
RECTAL PAIN: 0
CHILLS: 0
JAUNDICE: 0
POLYDIPSIA: 0
EYE WATERING: 0
DISTURBANCES IN COORDINATION: 0
SINUS CONGESTION: 0
DECREASED CONCENTRATION: 0
EXERCISE INTOLERANCE: 0
PANIC: 0
WEAKNESS: 0
PALPITATIONS: 0
NAUSEA: 0
HYPERTENSION: 0
SEIZURES: 0
VOMITING: 0
SLEEP DISTURBANCES DUE TO BREATHING: 0
BOWEL INCONTINENCE: 0
INCREASED ENERGY: 1
HYPOTENSION: 1
HEADACHES: 0
SMELL DISTURBANCE: 0
DIZZINESS: 1

## 2018-01-15 ENCOUNTER — ONCOLOGY VISIT (OUTPATIENT)
Dept: ONCOLOGY | Facility: CLINIC | Age: 58
End: 2018-01-15
Attending: PHYSICIAN ASSISTANT
Payer: COMMERCIAL

## 2018-01-15 ENCOUNTER — INFUSION THERAPY VISIT (OUTPATIENT)
Dept: ONCOLOGY | Facility: CLINIC | Age: 58
End: 2018-01-15
Attending: INTERNAL MEDICINE
Payer: COMMERCIAL

## 2018-01-15 ENCOUNTER — TELEPHONE (OUTPATIENT)
Dept: PHARMACY | Facility: CLINIC | Age: 58
End: 2018-01-15

## 2018-01-15 ENCOUNTER — APPOINTMENT (OUTPATIENT)
Dept: LAB | Facility: CLINIC | Age: 58
End: 2018-01-15
Attending: INTERNAL MEDICINE
Payer: COMMERCIAL

## 2018-01-15 ENCOUNTER — HOME INFUSION (PRE-WILLOW HOME INFUSION) (OUTPATIENT)
Dept: PHARMACY | Facility: CLINIC | Age: 58
End: 2018-01-15

## 2018-01-15 VITALS
SYSTOLIC BLOOD PRESSURE: 112 MMHG | TEMPERATURE: 98.3 F | BODY MASS INDEX: 28.11 KG/M2 | RESPIRATION RATE: 16 BRPM | WEIGHT: 168.9 LBS | HEART RATE: 70 BPM | OXYGEN SATURATION: 99 % | DIASTOLIC BLOOD PRESSURE: 76 MMHG

## 2018-01-15 DIAGNOSIS — C25.9 PANCREATIC ADENOCARCINOMA (H): Primary | ICD-10-CM

## 2018-01-15 LAB
ALBUMIN SERPL-MCNC: 3.6 G/DL (ref 3.4–5)
ALP SERPL-CCNC: 60 U/L (ref 40–150)
ALT SERPL W P-5'-P-CCNC: 41 U/L (ref 0–50)
ANION GAP SERPL CALCULATED.3IONS-SCNC: 7 MMOL/L (ref 3–14)
AST SERPL W P-5'-P-CCNC: 38 U/L (ref 0–45)
BASOPHILS # BLD AUTO: 0 10E9/L (ref 0–0.2)
BASOPHILS NFR BLD AUTO: 0.6 %
BILIRUB SERPL-MCNC: 0.4 MG/DL (ref 0.2–1.3)
BUN SERPL-MCNC: 13 MG/DL (ref 7–30)
CALCIUM SERPL-MCNC: 8.9 MG/DL (ref 8.5–10.1)
CHLORIDE SERPL-SCNC: 104 MMOL/L (ref 94–109)
CO2 SERPL-SCNC: 28 MMOL/L (ref 20–32)
CREAT SERPL-MCNC: 0.6 MG/DL (ref 0.52–1.04)
DIFFERENTIAL METHOD BLD: NORMAL
EOSINOPHIL # BLD AUTO: 0.2 10E9/L (ref 0–0.7)
EOSINOPHIL NFR BLD AUTO: 2.3 %
ERYTHROCYTE [DISTWIDTH] IN BLOOD BY AUTOMATED COUNT: 13.4 % (ref 10–15)
GFR SERPL CREATININE-BSD FRML MDRD: >90 ML/MIN/1.7M2
GLUCOSE SERPL-MCNC: 137 MG/DL (ref 70–99)
HCT VFR BLD AUTO: 38.4 % (ref 35–47)
HGB BLD-MCNC: 12.4 G/DL (ref 11.7–15.7)
IMM GRANULOCYTES # BLD: 0 10E9/L (ref 0–0.4)
IMM GRANULOCYTES NFR BLD: 0.3 %
LYMPHOCYTES # BLD AUTO: 2.5 10E9/L (ref 0.8–5.3)
LYMPHOCYTES NFR BLD AUTO: 35.4 %
MCH RBC QN AUTO: 30.3 PG (ref 26.5–33)
MCHC RBC AUTO-ENTMCNC: 32.3 G/DL (ref 31.5–36.5)
MCV RBC AUTO: 94 FL (ref 78–100)
MONOCYTES # BLD AUTO: 0.7 10E9/L (ref 0–1.3)
MONOCYTES NFR BLD AUTO: 9.2 %
NEUTROPHILS # BLD AUTO: 3.7 10E9/L (ref 1.6–8.3)
NEUTROPHILS NFR BLD AUTO: 52.2 %
NRBC # BLD AUTO: 0 10*3/UL
NRBC BLD AUTO-RTO: 0 /100
PLATELET # BLD AUTO: 293 10E9/L (ref 150–450)
POTASSIUM SERPL-SCNC: 3.9 MMOL/L (ref 3.4–5.3)
PROT SERPL-MCNC: 7.5 G/DL (ref 6.8–8.8)
RBC # BLD AUTO: 4.09 10E12/L (ref 3.8–5.2)
SODIUM SERPL-SCNC: 138 MMOL/L (ref 133–144)
WBC # BLD AUTO: 7.1 10E9/L (ref 4–11)

## 2018-01-15 PROCEDURE — 99214 OFFICE O/P EST MOD 30 MIN: CPT | Mod: ZP | Performed by: PHYSICIAN ASSISTANT

## 2018-01-15 PROCEDURE — 96416 CHEMO PROLONG INFUSE W/PUMP: CPT

## 2018-01-15 PROCEDURE — 96368 THER/DIAG CONCURRENT INF: CPT

## 2018-01-15 PROCEDURE — G0463 HOSPITAL OUTPT CLINIC VISIT: HCPCS | Mod: ZF

## 2018-01-15 PROCEDURE — 96411 CHEMO IV PUSH ADDL DRUG: CPT

## 2018-01-15 PROCEDURE — 96417 CHEMO IV INFUS EACH ADDL SEQ: CPT

## 2018-01-15 PROCEDURE — 96413 CHEMO IV INFUSION 1 HR: CPT

## 2018-01-15 PROCEDURE — 25000128 H RX IP 250 OP 636: Mod: ZF | Performed by: INTERNAL MEDICINE

## 2018-01-15 PROCEDURE — 85025 COMPLETE CBC W/AUTO DIFF WBC: CPT | Performed by: INTERNAL MEDICINE

## 2018-01-15 PROCEDURE — 96375 TX/PRO/DX INJ NEW DRUG ADDON: CPT

## 2018-01-15 PROCEDURE — 80053 COMPREHEN METABOLIC PANEL: CPT | Performed by: INTERNAL MEDICINE

## 2018-01-15 PROCEDURE — 96415 CHEMO IV INFUSION ADDL HR: CPT

## 2018-01-15 PROCEDURE — 25000128 H RX IP 250 OP 636: Mod: ZF | Performed by: PHYSICIAN ASSISTANT

## 2018-01-15 PROCEDURE — 86301 IMMUNOASSAY TUMOR CA 19-9: CPT | Performed by: INTERNAL MEDICINE

## 2018-01-15 RX ORDER — PALONOSETRON 0.05 MG/ML
0.25 INJECTION, SOLUTION INTRAVENOUS ONCE
Status: COMPLETED | OUTPATIENT
Start: 2018-01-15 | End: 2018-01-15

## 2018-01-15 RX ORDER — PROCHLORPERAZINE MALEATE 10 MG
10 TABLET ORAL EVERY 6 HOURS PRN
Qty: 30 TABLET | Refills: 2 | Status: SHIPPED | OUTPATIENT
Start: 2018-01-15 | End: 2018-05-14

## 2018-01-15 RX ORDER — DEXAMETHASONE 4 MG/1
8 TABLET ORAL DAILY
Qty: 6 TABLET | Refills: 2 | Status: SHIPPED | OUTPATIENT
Start: 2018-01-16 | End: 2018-01-20

## 2018-01-15 RX ORDER — FLUOROURACIL 50 MG/ML
400 INJECTION, SOLUTION INTRAVENOUS ONCE
Status: COMPLETED | OUTPATIENT
Start: 2018-01-15 | End: 2018-01-15

## 2018-01-15 RX ORDER — HEPARIN SODIUM (PORCINE) LOCK FLUSH IV SOLN 100 UNIT/ML 100 UNIT/ML
5 SOLUTION INTRAVENOUS
Status: COMPLETED | OUTPATIENT
Start: 2018-01-15 | End: 2018-01-15

## 2018-01-15 RX ORDER — ONDANSETRON 8 MG/1
8 TABLET, FILM COATED ORAL EVERY 8 HOURS PRN
Qty: 30 TABLET | Refills: 0 | Status: SHIPPED | OUTPATIENT
Start: 2018-01-15 | End: 2018-10-09

## 2018-01-15 RX ORDER — EPINEPHRINE 0.3 MG/.3ML
INJECTION SUBCUTANEOUS
Status: DISCONTINUED
Start: 2018-01-15 | End: 2018-01-15 | Stop reason: WASHOUT

## 2018-01-15 RX ORDER — LORAZEPAM 0.5 MG/1
0.5 TABLET ORAL EVERY 4 HOURS PRN
Qty: 30 TABLET | Refills: 2 | Status: SHIPPED | OUTPATIENT
Start: 2018-01-15 | End: 2018-05-14

## 2018-01-15 RX ORDER — LOPERAMIDE HCL 2 MG
CAPSULE ORAL
Qty: 30 CAPSULE | Refills: 0 | Status: ON HOLD | OUTPATIENT
Start: 2018-01-15 | End: 2018-01-27

## 2018-01-15 RX ADMIN — FLUOROURACIL 750 MG: 50 INJECTION, SOLUTION INTRAVENOUS at 15:45

## 2018-01-15 RX ADMIN — PALONOSETRON HYDROCHLORIDE 0.25 MG: 0.25 INJECTION INTRAVENOUS at 10:59

## 2018-01-15 RX ADMIN — IRINOTECAN HYDROCHLORIDE 340 MG: 20 INJECTION, SOLUTION INTRAVENOUS at 13:33

## 2018-01-15 RX ADMIN — LEUCOVORIN CALCIUM 750 MG: 350 INJECTION, POWDER, LYOPHILIZED, FOR SOLUTION INTRAMUSCULAR; INTRAVENOUS at 13:33

## 2018-01-15 RX ADMIN — SODIUM CHLORIDE, PRESERVATIVE FREE 5 ML: 5 INJECTION INTRAVENOUS at 08:29

## 2018-01-15 RX ADMIN — ATROPINE SULFATE 0.4 MG: 0.4 INJECTION INTRAMUSCULAR; INTRAVENOUS; SUBCUTANEOUS at 14:25

## 2018-01-15 RX ADMIN — DEXTROSE 250 ML: 5 SOLUTION INTRAVENOUS at 10:54

## 2018-01-15 RX ADMIN — DEXAMETHASONE SODIUM PHOSPHATE 12 MG: 10 INJECTION, SOLUTION INTRAMUSCULAR; INTRAVENOUS at 11:02

## 2018-01-15 RX ADMIN — OXALIPLATIN 150 MG: 5 INJECTION INTRAVENOUS at 11:27

## 2018-01-15 ASSESSMENT — PAIN SCALES - GENERAL: PAINLEVEL: NO PAIN (0)

## 2018-01-15 NOTE — PATIENT INSTRUCTIONS
Contact Numbers  [facility] Nurse Triage: [phone]  After Hours Nurse Line:  [phone]    Please call the [facility] Triage line if you experience a temperature greater than or equal to 100.5, shaking chills, have uncontrolled nausea, vomiting and/or diarrhea, dizziness, shortness of breath, chest pain, bleeding, unexplained bruising, or if you have any other new/concerning symptoms, questions or concerns.     If it is after hours, weekends, or holidays, please call either the after hours nurse line listed above.    If you are having any concerning symptoms or wish to speak to a provider before your next infusion visit, please call your care coordinator or triage to notify them so we can adequately serve you.     If you need a refill on a narcotic prescription or other medication, please call triage before your infusion appointment.         January 2018 Sunday Monday Tuesday Wednesday Thursday Friday Saturday        1     2     3     4     5     6       7     8     LAB WITH  CLINIC    7:15 AM   (15 min.)    LAB   [facility] Lab     [facility] NEW    7:30 AM   (60 min.)   Jovany Monk MD   [facility] NEW    9:15 AM   (60 min.)   Ja Byrd MD   [facility] 9     10     CT CHEST/ABDOMEN/PELVIS W   10:15 AM   (30 min.)   [place]CT1   [facility] CT 11     12     Outpatient Visit    7:27 AM   [facility]     IR CHEST PORT PLACEMENT >5 YRS    8:15 AM   (75 min.)   [id number]   [facility] Imaging     INSERT PORT VASCULAR ACCESS    9:45 AM   Chanda Lawson PA-C    [facility] 13       14     15     [facility]    8:00 AM   (15 min.)    MASONIC LAB DRAW   [facility] Lab Draw     [facility] RETURN    8:15 AM   (50 min.)   Renate Mcnair PA-C   [facility] ONC INFUSION 360   10:00 AM   (360 min.)    ONCOLOGY INFUSION   [facility]     MA SCREENING DIGITAL BILATERAL    3:15 PM   (15  min.)   [id number]   [facility] 16     17     18     19     20       21     22     23     24     25     26     27       28     29     [facility] LAB DRAW    8:15 AM   (15 min.)    MASONIC LAB DRAW   [facility] Mason[facility] Lab Draw     [facility] RETURN    8:25 AM   (50 min.)   Kellie Nobles PA-C [facility]     [facility] ONC INFUSION 360    9:00 AM   (360 min.)   [facility] ONCOLOGY INFUSION   [facility]     [facility] NEW    9:30 AM   (60 min.)   Shivani Vasquez RD   [facility] 30 31 February 2018 Sunday Monday Tuesday Wednesday Thursday Friday Saturday                       1     2     3       4     5     6     7     8     9     10       11     12     [facility] LAB DRAW    9:15 AM   (15 min.)    MASONIC LAB DRAW   [facility] Masonic Lab Draw     [facility] RETURN    9:25 AM   (50 min.)   [facility]   10:30 AM   (360 min.)    ONCOLOGY INFUSION   [facility] 13     14     15     16     17       18     19     20     21     22     23     24       25     26     P MASONIC LAB DRAW    8:00 AM   (15 min.)    MASONIC LAB DRAW   [facility] Masonic Lab Draw     [facility] RETURN    8:15 AM   (50 min.)   Renate Mcnair PA-C [facility]     [facility] ONC INFUSION 360    9:00 AM   (360 min.)    ONCOLOGY INFUSION   [facility] 27     28                                 Lab Results:  Recent Results (from the past 12 hour(s))   *CBC with platelets differential    Collection Time: 01/15/18  8:40 AM   Result Value Ref Range    WBC 7.1 4.0 - 11.0 10e9/L    RBC Count 4.09 3.8 - 5.2 10e12/L    Hemoglobin 12.4 11.7 - 15.7 g/dL    Hematocrit 38.4 35.0 - 47.0 %    MCV 94 78 - 100 fl    MCH 30.3 26.5 - 33.0 pg    MCHC 32.3 31.5 - 36.5 g/dL    RDW 13.4 10.0 - 15.0 %    Platelet Count 293 150 - 450 10e9/L    Diff  Method Automated Method     % Neutrophils 52.2 %    % Lymphocytes 35.4 %    % Monocytes 9.2 %    % Eosinophils 2.3 %    % Basophils 0.6 %    % Immature Granulocytes 0.3 %    Nucleated RBCs 0 0 /100    Absolute Neutrophil 3.7 1.6 - 8.3 10e9/L    Absolute Lymphocytes 2.5 0.8 - 5.3 10e9/L    Absolute Monocytes 0.7 0.0 - 1.3 10e9/L    Absolute Eosinophils 0.2 0.0 - 0.7 10e9/L    Absolute Basophils 0.0 0.0 - 0.2 10e9/L    Abs Immature Granulocytes 0.0 0 - 0.4 10e9/L    Absolute Nucleated RBC 0.0    Comprehensive metabolic panel    Collection Time: 01/15/18  8:40 AM   Result Value Ref Range    Sodium 138 133 - 144 mmol/L    Potassium 3.9 3.4 - 5.3 mmol/L    Chloride 104 94 - 109 mmol/L    Carbon Dioxide 28 20 - 32 mmol/L    Anion Gap 7 3 - 14 mmol/L    Glucose 137 (H) 70 - 99 mg/dL    Urea Nitrogen 13 7 - 30 mg/dL    Creatinine 0.60 0.52 - 1.04 mg/dL    GFR Estimate >90 >60 mL/min/1.7m2    GFR Estimate If Black >90 >60 mL/min/1.7m2    Calcium 8.9 8.5 - 10.1 mg/dL    Bilirubin Total 0.4 0.2 - 1.3 mg/dL    Albumin 3.6 3.4 - 5.0 g/dL    Protein Total 7.5 6.8 - 8.8 g/dL    Alkaline Phosphatase 60 40 - 150 U/L    ALT 41 0 - 50 U/L    AST 38 0 - 45 U/L

## 2018-01-15 NOTE — NURSING NOTE
"Chief Complaint   Patient presents with     Port Draw     labs drawn from port by rn.  vs taken     Port accessed with 20 gauge 3/4\" Power needle and labs drawn by rn.  Port flushed with NS and heparin.  Pt tolerated well.  VS taken.  Pt checked in for next appt.  Janki Elias RN      "

## 2018-01-15 NOTE — NURSING NOTE
"Oncology Rooming Note    January 15, 2018 8:51 AM   Kitty Bangura is a 57 year old female who presents for:    Chief Complaint   Patient presents with     Port Draw     labs drawn from port by rn.  vs taken     Oncology Clinic Visit     Pancreatic CA     Initial Vitals: /76 (BP Location: Right arm, Patient Position: Sitting, Cuff Size: Adult Regular)  Pulse 70  Temp 98.3  F (36.8  C) (Oral)  Resp 16  Wt 76.6 kg (168 lb 14.4 oz)  SpO2 99%  BMI 28.11 kg/m2 Estimated body mass index is 28.11 kg/(m^2) as calculated from the following:    Height as of 1/12/18: 1.651 m (5' 5\").    Weight as of this encounter: 76.6 kg (168 lb 14.4 oz). Body surface area is 1.87 meters squared.  No Pain (0) Comment: Data Unavailable   No LMP recorded. Patient is postmenopausal.  Allergies reviewed: Yes  Medications reviewed: Yes    Medications: Medication refills not needed today.  Pharmacy name entered into [facility]:    [facility] - [place], [place] - [address] [place] [address] [id number] - [place], [place] - [address] AT [facility] OF [address] [place] [address]    Clinical concerns: Patient states there are no new concerns to discuss with provider.  Zuri Mcnair was not notified.       6 minutes for nursing intake (face to face time)     Ewelina Rodriguez CMA              "

## 2018-01-15 NOTE — TELEPHONE ENCOUNTER
[facility] PRIOR AUTHORIZATION REQUEST     DruFU - Fluorouracil  4490mg cont  J Code:  NDC: [address]-04  ICD 10 code: C25.9    Date(s) of Service: 01/15/2018    Provider: Jovany Monk  Provider NPI: [id number]    [facility]  NPI: [id number]

## 2018-01-15 NOTE — LETTER
1/15/2018      RE: Kitty Bangura  [address] [facility] [address]       [facility]  FOLLOW-UP VISIT NOTE  Date of visit: Darryl 15, 2018            REASON FOR VISIT: newly diagnosed resectable pancreatic cancer, here for assessment prior to C1 of FOLFIRINOX     HPI: Kitty Bangura is a 57 year old woman, previously healthy until she presented in early November 2017 with abdominal pain. CT abdomen on 11/1/17 showed acute pancreatitis (lipase 41,960) and also a 3cm heterogenous pancreatic tail mass. The etiology of pancreatitis is unclear - no obstructing gallstone and not a heavy drinker. She was hospitalized for one week and followed up with Dr. González Metz in GI clinic.   - On 11/29/17 she had endoscopic drainage of walled-off area of pancreatic necrosis by Dr. Metz. During the same procedure she had an EUS FNA of the pancreatic tail mass and pathology showed adenocarcinoma. The mass measured 33 x 27 mm, encapsulated with solid and cystic components, rim calcification, and no evidence of invasion.   - Of note, an abdominal MRI in 2004 showed a 2.5 cm cystic lesion in the tail of the pancreas. She had no interval imaging between 2004 and 2017.   - Her case was discussed at tumor conference on 12/4/17 with plan to complete staging and refer to medical and surgical oncology   - Staging CT chest/abd/pelvis on 12/9/17 showed several small pulmonary nodules (largest 3mm), unchanged mass in the pancreatic tail, mildly prominent mesenteric nodes thought likely reactive, and small right hepatic lobe hypodensities. Abdominal MRI on 12/10/17 showed hepatic hemangiomas, no evidence of metastatic disease to the liver.   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    Kitty met with Dr Monk and discussed neoadjuvant chemotherapy with FOLFIRINOX, then surgery and then adjuvant  chemotherapy.         INTERVAL HISTORY: Kitty is here with her  today.  She actually had a really good week last week.  Her port placement went well with minimum tenderness.  She also had a tooth pulled and its healed remarkably without sequela.  Her abdominal pain is almost non-existent.  She is taking her Creon very scheduled and not having cramping or consistent pain with eating.  Her stools are fairly regular- she was mildly constipated after her port placement and this resolved.  She has some history of vertigo intermittently and has noticed this on occasion when laying back in bed.  She knows the maneuvers to fix this.  Her BP has been running slightly low and on occasion she gets some lightheadedness.  Otherwise she has been eating week with a very low fat diet (3 grams per meal), hydrating well.  Sleep has been going fairly well, some anticipatory anxiety, but she feels very hopeful with her plan.      EXAM:  /76 (BP Location: Right arm, Patient Position: Sitting, Cuff Size: Adult Regular)  Pulse 70  Temp 98.3  F (36.8  C) (Oral)  Resp 16  Wt 76.6 kg (168 lb 14.4 oz)  SpO2 99%  BMI 28.11 kg/m2  Wt Readings from Last 4 Encounters:   01/15/18 76.6 kg (168 lb 14.4 oz)   01/12/18 76.2 kg (168 lb)   01/08/18 76.5 kg (168 lb 11.2 oz)   01/08/18 76.5 kg (168 lb 11.2 oz)     Vital signs were reviewed.   Patient alert and oriented x3.   PERRLA. EOMI. No scleral icterus noted. OP without thrush/sores.  Neck exam: No palpable cervical, supraclavicular or axillary nodes bilaterally.   Breast exam done- no R sided mass felt  Heart: RRR no murmurs noted. Port in place, old ecchymosis noted, otherwise c/d/i.  Lungs: clear to auscultation bilaterally.  No crackles or wheezing.   Abd: positive bowel sounds in all four quadrants.  No tenderness to palpation.  No hepatomegaly.   Extremities: No lower extremity edema.   Neuro: grossly intact.   Mood and affect is stable.       LABS:    1/15/2018 08:40   WBC  7.1   Hemoglobin 12.4   Hematocrit 38.4   Platelet Count 293   RBC Count 4.09   MCV 94      1/15/2018 08:40   Sodium 138   Potassium 3.9   Chloride 104   Carbon Dioxide 28   Urea Nitrogen 13   Creatinine 0.60   GFR Estimate >90   GFR Estimate If Black >90   Calcium 8.9   Anion Gap 7   Albumin 3.6   Protein Total 7.5   Bilirubin Total 0.4   Alkaline Phosphatase 60   ALT 41   AST 38   Glucose 137 (H)     CT CAP IMPRESSION:   1. A 3.2 cm heterogeneously hypoenhancing nodule in the pancreatic  tail is not significantly changed, and is consistent with the  patient's known adenocarcinoma.  2. A smaller hypoenhancing nodule in the pancreatic neck/body measures  1.8 cm, and could be related to a focal area of pancreatic necrosis in  this location, although an additional area of malignancy cannot be  excluded.  3. Mild ill-defined stranding about the pancreatic head inferiorly has  a similar appearance to the previous exam, and could be related to  pancreatitis or spread of tumor.  4. A mildly enlarged peripancreatic/gastrohepatic ligament lymph node  is unchanged, and remains indeterminate.  5. A few tiny indeterminate pulmonary nodules in the right lung  measure 0.3 cm or smaller, and are unchanged.   6. Indeterminate 0.9 cm soft tissue nodule in the right breast  laterally is unchanged. Neoplasm cannot be excluded;  clinical and  mammographic correlation is recommended.    ASSESSMENT/PLAN:     # pancreatic cancer- discussed FOLFIRINOX today, including toxicities: nausea/vomiting, diarrhea, mucositis, cold neuropathy, sensory neuropathy, fatigue and infection.  We reviewed anti emetics including dex, zofran, compazine and ativan and anti diarrheal medications including imodium.  We discussed nutrition and hydration goals as well.    -RTC in 2 weeks labs and AMOR visit prior to C2    # GI pain- really minimal while taking Creon with meals and snacks. She would like to advance her diet from 3 grams of fat to a little more.   Has touched base with the nutritionalist.      #breast mass- noted on CT scan from 1/10.  No breast mass felt on exam, but very dense breast tissue.  Will set up for mammogram here or up at [place] within the week.    #coping- well, has good support and family.  Feels hopeful for her chemo response    Zuri Mcnair PA-C

## 2018-01-15 NOTE — PROGRESS NOTES
[facility] CANCER CLINIC  FOLLOW-UP VISIT NOTE  Date of visit: Darryl 15, 2018            REASON FOR VISIT: newly diagnosed resectable pancreatic cancer, here for assessment prior to C1 of FOLFIRINOX     HPI: Kitty Bangura is a 57 year old woman, previously healthy until she presented in early November 2017 with abdominal pain. CT abdomen on 11/1/17 showed acute pancreatitis (lipase 41,960) and also a 3cm heterogenous pancreatic tail mass. The etiology of pancreatitis is unclear - no obstructing gallstone and not a heavy drinker. She was hospitalized for one week and followed up with Dr. González Metz in GI clinic.   - On 11/29/17 she had endoscopic drainage of walled-off area of pancreatic necrosis by Dr. Metz. During the same procedure she had an EUS FNA of the pancreatic tail mass and pathology showed adenocarcinoma. The mass measured 33 x 27 mm, encapsulated with solid and cystic components, rim calcification, and no evidence of invasion.   - Of note, an abdominal MRI in 2004 showed a 2.5 cm cystic lesion in the tail of the pancreas. She had no interval imaging between 2004 and 2017.   - Her case was discussed at tumor conference on 12/4/17 with plan to complete staging and refer to medical and surgical oncology   - Staging CT chest/abd/pelvis on 12/9/17 showed several small pulmonary nodules (largest 3mm), unchanged mass in the pancreatic tail, mildly prominent mesenteric nodes thought likely reactive, and small right hepatic lobe hypodensities. Abdominal MRI on 12/10/17 showed hepatic hemangiomas, no evidence of metastatic disease to the liver.   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    Kitty met with Dr Monk and discussed neoadjuvant chemotherapy with FOLFIRINOX, then surgery and then adjuvant chemotherapy.         INTERVAL HISTORY: Kitty is here with her  today.  She  actually had a really good week last week.  Her port placement went well with minimum tenderness.  She also had a tooth pulled and its healed remarkably without sequela.  Her abdominal pain is almost non-existent.  She is taking her Creon very scheduled and not having cramping or consistent pain with eating.  Her stools are fairly regular- she was mildly constipated after her port placement and this resolved.  She has some history of vertigo intermittently and has noticed this on occasion when laying back in bed.  She knows the maneuvers to fix this.  Her BP has been running slightly low and on occasion she gets some lightheadedness.  Otherwise she has been eating week with a very low fat diet (3 grams per meal), hydrating well.  Sleep has been going fairly well, some anticipatory anxiety, but she feels very hopeful with her plan.      EXAM:  /76 (BP Location: Right arm, Patient Position: Sitting, Cuff Size: Adult Regular)  Pulse 70  Temp 98.3  F (36.8  C) (Oral)  Resp 16  Wt 76.6 kg (168 lb 14.4 oz)  SpO2 99%  BMI 28.11 kg/m2  Wt Readings from Last 4 Encounters:   01/15/18 76.6 kg (168 lb 14.4 oz)   01/12/18 76.2 kg (168 lb)   01/08/18 76.5 kg (168 lb 11.2 oz)   01/08/18 76.5 kg (168 lb 11.2 oz)     Vital signs were reviewed.   Patient alert and oriented x3.   PERRLA. EOMI. No scleral icterus noted. OP without thrush/sores.  Neck exam: No palpable cervical, supraclavicular or axillary nodes bilaterally.   Breast exam done- no R sided mass felt  Heart: RRR no murmurs noted. Port in place, old ecchymosis noted, otherwise c/d/i.  Lungs: clear to auscultation bilaterally.  No crackles or wheezing.   Abd: positive bowel sounds in all four quadrants.  No tenderness to palpation.  No hepatomegaly.   Extremities: No lower extremity edema.   Neuro: grossly intact.   Mood and affect is stable.       LABS:    1/15/2018 08:40   WBC 7.1   Hemoglobin 12.4   Hematocrit 38.4   Platelet Count 293   RBC Count 4.09   MCV  94      1/15/2018 08:40   Sodium 138   Potassium 3.9   Chloride 104   Carbon Dioxide 28   Urea Nitrogen 13   Creatinine 0.60   GFR Estimate >90   GFR Estimate If Black >90   Calcium 8.9   Anion Gap 7   Albumin 3.6   Protein Total 7.5   Bilirubin Total 0.4   Alkaline Phosphatase 60   ALT 41   AST 38   Glucose 137 (H)     CT CAP IMPRESSION:   1. A 3.2 cm heterogeneously hypoenhancing nodule in the pancreatic  tail is not significantly changed, and is consistent with the  patient's known adenocarcinoma.  2. A smaller hypoenhancing nodule in the pancreatic neck/body measures  1.8 cm, and could be related to a focal area of pancreatic necrosis in  this location, although an additional area of malignancy cannot be  excluded.  3. Mild ill-defined stranding about the pancreatic head inferiorly has  a similar appearance to the previous exam, and could be related to  pancreatitis or spread of tumor.  4. A mildly enlarged peripancreatic/gastrohepatic ligament lymph node  is unchanged, and remains indeterminate.  5. A few tiny indeterminate pulmonary nodules in the right lung  measure 0.3 cm or smaller, and are unchanged.   6. Indeterminate 0.9 cm soft tissue nodule in the right breast  laterally is unchanged. Neoplasm cannot be excluded;  clinical and  mammographic correlation is recommended.    ASSESSMENT/PLAN:     # pancreatic cancer- discussed FOLFIRINOX today, including toxicities: nausea/vomiting, diarrhea, mucositis, cold neuropathy, sensory neuropathy, fatigue and infection.  We reviewed anti emetics including dex, zofran, compazine and ativan and anti diarrheal medications including imodium.  We discussed nutrition and hydration goals as well.    -RTC in 2 weeks labs and AMOR visit prior to C2    # GI pain- really minimal while taking Creon with meals and snacks. She would like to advance her diet from 3 grams of fat to a little more.  Has touched base with the nutritionalist.      #breast mass- noted on CT scan from  1/10.  No breast mass felt on exam, but very dense breast tissue.  Will set up for mammogram here or up at [place] within the week.    #coping- well, has good support and family.  Feels hopeful for her chemo response    Zuri Mcnair PA-C

## 2018-01-15 NOTE — MR AVS SNAPSHOT
After Visit Summary   1/15/2018    Kitty Bangura    MRN: [id number]           Patient Information     Date Of Birth          1960        Visit Information        Provider Department      1/15/2018 8:30 AM Renate Mcnair PA-C [facility]        Today's Diagnoses     Pancreatic adenocarcinoma (H)    -  1       Follow-ups after your visit        Your next 10 appointments already scheduled     Jan 29, 2018  8:15 AM CST   [facility] Lab Draw with [facility] LAB DRAW   [facility] Draw ([facility])    [address] [place] [address]  [address]  [facility] [address]   [phone]            Jan 29, 2018  8:40 AM CST   (Arrive by 8:25 AM)   Return Visit with FORREST Ackerman [facility] ([facility])    [address] [place] [address]  [address]  [facility] [address]   [phone]            Jan 29, 2018  9:00 AM CST   Infusion 360 with UC ONCOLOGY INFUSION, UC 26 ATC   [facility] ([facility])    [address] [place] [address]  [address]  [facility] [address]   [phone]            Jan 29, 2018  9:30 AM CST   New Patient Visit with Shivani Noel RD   [facility] ([facility])    [address] [place] [address]  [address]  [facility] [address]   [phone]            Feb 12, 2018  9:15 AM CST   [facility] Lab Draw with [facility] LAB DRAW   [facility] Draw ([facility])    [address] [place] [address]  [address]  [facility] [address]   [phone]            Feb 12, 2018  9:40 AM CST   (Arrive by 9:25 AM)   Return Visit with FORREST Ackerman [facility] ([facility])    [address] [place] [address]  [address]  [facility] [address]   [phone]            Feb 12, 2018 10:30 AM CST   Infusion 360 with UC ONCOLOGY INFUSION, UC 21 ATC    [facility] ([facility])    [address]  [address]  [facility] [id number] [phone]            Feb 26, 2018  8:00 AM [facility]   [facility] Lab Draw with  [facility] LAB DRAW   [facility] Draw ([facility])    [address]  [address]  [facility] [address] [phone]              Future tests that were ordered for you today     Open Future Orders        Priority Expected Expires Ordered    MA Screening Digital Bilateral Routine  1/15/2019 1/15/2018            Who to contact     If you have questions or need follow up information about today's clinic visit or your schedule please contact [facility]CER [facility] directly at [phone].  Normal or non-critical lab and imaging results will be communicated to you by MyChart, letter or phone within 4 business days after the clinic has received the results. If you do not hear from us within 7 days, please contact the clinic through [employer] or phone. If you have a critical or abnormal lab result, we will notify you by phone as soon as possible.  Submit refill requests through [employer] or call your pharmacy and they will forward the refill request to us. Please allow 3 business days for your refill to be completed.          Additional Information About Your Visit        [employer] Information     [employer] gives you secure access to your electronic health record. If you see a primary care provider, you can also send messages to your care team and make appointments. If you have questions, please call your primary care clinic.  If you do not have a primary care provider, please call [phone] and they will assist you.        Care EveryWhere ID     This is your Care EveryWhere ID. This could be used by other organizations to access your [facility] medical records  [id number]        Your Vitals Were     Pulse Temperature Respirations Pulse Oximetry BMI (Body Mass Index)        70 98.3  F (36.8  C) (Oral) 16 99% 28.11 kg/m2        Blood Pressure from Last 3 Encounters:   01/15/18 112/76   01/12/18 95/60   01/08/18 116/80    Weight from Last 3 Encounters:   01/15/18 76.6 kg (168 lb 14.4 oz)   01/12/18 76.2 kg (168 lb)   01/08/18 76.5 kg (168 lb 11.2 oz)                 Today's Medication Changes          These changes are accurate as of: 1/15/18  3:24 PM.  If you have any questions, ask your nurse or doctor.               Start taking these medicines.        Dose/Directions    dexamethasone 4 MG tablet   Commonly known as:  DECADRON   Used for:  Pancreatic adenocarcinoma (H)        Dose:  8 mg   Start taking on:  1/16/2018   Take 2 tablets (8 mg) by mouth daily for 3 days Days 2 through 4.   Quantity:  6 tablet   Refills:  2       loperamide 2 MG capsule   Commonly known as:  IMODIUM   Used for:  Pancreatic adenocarcinoma (H)        2 caps at 1st sign of diarrhea & 1 cap every 2hrs until 12hrs diarrhea free. During night, 2 caps at bedtime & 2 caps every 4hrs until AM   Quantity:  30 capsule   Refills:  0       LORazepam 0.5 MG tablet   Commonly known as:  ATIVAN   Used for:  Pancreatic adenocarcinoma (H)        Dose:  0.5 mg   Take 1 tablet (0.5 mg) by mouth every 4 hours as needed (Anxiety, Nausea/Vomiting or Sleep)   Quantity:  30 tablet   Refills:  2       ondansetron 8 MG tablet   Commonly known as:  ZOFRAN   Used for:  Pancreatic adenocarcinoma (H)   Started by:  Renate Mcnair PA-C        Dose:  8 mg   Take 1 tablet (8 mg) by mouth every 8 hours as needed for nausea   Quantity:  30 tablet   Refills:  0       prochlorperazine 10 MG tablet   Commonly known as:  COMPAZINE   Used for:  Pancreatic adenocarcinoma (H)        Dose:  10 mg   Take 1 tablet (10 mg) by mouth every 6 hours as needed (Nausea/Vomiting)   Quantity:  30 tablet   Refills:  2            Where to get your medicines      These medications were sent to [place], [place] - Count includes the [facility]  [address]  [address], [facility] [address]    Hours:  TRANSPLANT PHONE NUMBER [phone] Phone:  [phone]     dexamethasone 4 MG tablet    loperamide 2 MG capsule    ondansetron 8 MG tablet    prochlorperazine 10 MG tablet         Some of these will need a paper prescription and others can be bought over the counter.  Ask your nurse if you have questions.     Bring a paper prescription for each of these medications     LORazepam 0.5 MG tablet                Primary Care Provider Office Phone # Fax #    Solange Mcgill [phone] [address] [address]        Equal Access to Services     CHASE DE GUZMAN : Tre Ruiz, solange rasmussen, qajayro kaalmafredy ferguson, re yip. So [place] -[phone].    ATENCIÓN: Si habla español, tiene a quiros disposición servicios gratuitos de asistencia lingüística. Macy al [phone].    We comply with applicable federal civil rights laws and [place] laws. We do not discriminate on the basis of race, color, national origin, age, disability, sex, sexual orientation, or gender identity.            Thank you!     Thank you for choosing [facility] [place]  for your care. Our goal is always to provide you with excellent care. Hearing back from our patients is one way we can continue to improve our services. Please take a few minutes to complete the written survey that you may receive in the mail after your visit with us. Thank you!             Your Updated Medication List - Protect others around you: Learn how to safely use, store and throw away your medicines at www.disposemymeds.org.          This list is accurate as of: 1/15/18  3:24 PM.  Always use your most recent med list.                   Brand Name Dispense Instructions for use Diagnosis    acetaminophen 325 MG tablet    TYLENOL    100 tablet    Take 2 tablets (650 mg) by mouth every 4  hours as needed for mild pain    Acute pancreatitis without infection or necrosis, unspecified pancreatitis type       amylase-lipase-protease [address]11-[address] UNITS Cpep per EC capsule    CREON    450 capsule    Take 2-3 with meals / 1-2 with snacks, up to 15 per day.    Necrotizing pancreatitis       dexamethasone 4 MG tablet   Start taking on:  1/16/2018    DECADRON    6 tablet    Take 2 tablets (8 mg) by mouth daily for 3 days Days 2 through 4.    Pancreatic adenocarcinoma (H)       HYDROcodone-acetaminophen 5-325 MG per tablet    NORCO     Take 1 tablet by mouth every 6 hours as needed for moderate to severe pain        loperamide 2 MG capsule    IMODIUM    30 capsule    2 caps at 1st sign of diarrhea & 1 cap every 2hrs until 12hrs diarrhea free. During night, 2 caps at bedtime & 2 caps every 4hrs until AM    Pancreatic adenocarcinoma (H)       LORazepam 0.5 MG tablet    ATIVAN    30 tablet    Take 1 tablet (0.5 mg) by mouth every 4 hours as needed (Anxiety, Nausea/Vomiting or Sleep)    Pancreatic adenocarcinoma (H)       ondansetron 8 MG tablet    ZOFRAN    30 tablet    Take 1 tablet (8 mg) by mouth every 8 hours as needed for nausea    Pancreatic adenocarcinoma (H)       prochlorperazine 10 MG tablet    COMPAZINE    30 tablet    Take 1 tablet (10 mg) by mouth every 6 hours as needed (Nausea/Vomiting)    Pancreatic adenocarcinoma (H)

## 2018-01-15 NOTE — TELEPHONE ENCOUNTER
PA Initiation    Medication: 5FU - Fluorouracil  4490mg cont  Insurance Company: [employer] - Phone [phone] Fax [phone]  [facility]  Filling Pharmacy Phone: [phone]  Filling Pharmacy Fax: [phone]  Start Date: 1/15/2018    Manually faxed [employer] at [phone] along with most recent office visit chart notes.

## 2018-01-15 NOTE — MR AVS SNAPSHOT
After Visit Summary   1/15/2018    Kitty Bangura    MRN: [id number]           Patient Information     Date Of Birth          1960        Visit Information        Provider Department      1/15/2018 10:00 AM  22 ATC;  ONCOLOGY INFUSION [facility]        Today's Diagnoses     Pancreatic adenocarcinoma (H)    -  1      Care Instructions    Contact Numbers  [facility] Nurse Triage: [phone]  After Hours Nurse Line:  [phone]    Please call the [facility] Triage line if you experience a temperature greater than or equal to 100.5, shaking chills, have uncontrolled nausea, vomiting and/or diarrhea, dizziness, shortness of breath, chest pain, bleeding, unexplained bruising, or if you have any other new/concerning symptoms, questions or concerns.     If it is after hours, weekends, or holidays, please call either the after hours nurse line listed above.    If you are having any concerning symptoms or wish to speak to a provider before your next infusion visit, please call your care coordinator or triage to notify them so we can adequately serve you.     If you need a refill on a narcotic prescription or other medication, please call triage before your infusion appointment.         January 2018 Sunday Monday Tuesday Wednesday Thursday Friday Saturday        1     2     3     4     5     6       7     8     LAB WITH  CLINIC    7:15 AM   (15 min.)    LAB   [facility]     [facility]    7:30 AM   (60 min.)   Jovany Monk MD   [facility]    9:15 AM   (60 min.)   Ja Byrd MD   [facility] 9     10     CT CHEST/ABDOMEN/PELVIS W   10:15 AM   (30 min.)   WYCT1   [facility] CT 11     12     Outpatient Visit    7:27 AM   [facility]     IR CHEST PORT PLACEMENT >5 YRS    8:15 AM   (75 min.)   [id number]   [facility]     INSERT PORT VASCULAR ACCESS    9:45 AM   Chanda Lawson,  FORREST    OR 13       14     15     [facility] D[facility]    8:00 AM   (15 min.)    [facility] LAB DRAW   [facility] Draw     [facility] RETURN    8:15 AM   (50 min.)   Renate Mcnair PA-C M [facility]     [facility] ONC INFUSION 360   10:00 AM   (360 min.)    ONCOLOGY INFUSION   [facility]     MA SCREENING DIGITAL BILATERAL    3:15 PM   (15 min.)   UCBCMA1   [facility] Imaging 16     17     18     19     20       21     22     23     24     25     26     27       28     29     [facility] DRAW    8:15 AM   (15 min.)    [facility] LAB DRAW   [facility] Draw     [facility] RETURN    8:25 AM   (50 min.)   Kellie Nobles PA-C [facility] 360    9:00 AM   (360 min.)    ONCOLOGY INFUSION   [facility]     [facility] NEW    9:30 AM   (60 min.)   Shivani Vasquez RD   [facility] 30 31 February 2018 Sunday Monday Tuesday Wednesday Thursday Friday Saturday                       1     2     3       4     5     6     7     8     9     10       11     12     [facility] DRAW    9:15 AM   (15 min.)    [facility] LAB DRAW   [facility] Draw     [facility] RETURN    9:25 AM   (50 min.)   Kellie Nobles [facility]C [facility]   10:30 AM   (360 min.)    ONCOLOGY INFUSION   [facility] 13     14     15     16     17       18     19     20     21     22     23     24       25     26     [facility] DRAW    8:00 AM   (15 min.)    [facility] LAB DRAW   [facility] Draw     [facility] RETURN    8:15 AM   (50 min.)   Renate Mcnair PA-C M [facility]     [facility] ONC INFUSION 360    9:00 AM   (360 min.)    ONCOLOGY INFUSION   [facility] 27     28                                 Lab Results:  Recent Results (from the past 12 hour(s))    *CBC with platelets differential    Collection Time: 01/15/18  8:40 AM   Result Value Ref Range    WBC 7.1 4.0 - 11.0 10e9/L    RBC Count 4.09 3.8 - 5.2 10e12/L    Hemoglobin 12.4 11.7 - 15.7 g/dL    Hematocrit 38.4 35.0 - 47.0 %    MCV 94 78 - 100 fl    MCH 30.3 26.5 - 33.0 pg    MCHC 32.3 31.5 - 36.5 g/dL    RDW 13.4 10.0 - 15.0 %    Platelet Count 293 150 - 450 10e9/L    Diff Method Automated Method     % Neutrophils 52.2 %    % Lymphocytes 35.4 %    % Monocytes 9.2 %    % Eosinophils 2.3 %    % Basophils 0.6 %    % Immature Granulocytes 0.3 %    Nucleated RBCs 0 0 /100    Absolute Neutrophil 3.7 1.6 - 8.3 10e9/L    Absolute Lymphocytes 2.5 0.8 - 5.3 10e9/L    Absolute Monocytes 0.7 0.0 - 1.3 10e9/L    Absolute Eosinophils 0.2 0.0 - 0.7 10e9/L    Absolute Basophils 0.0 0.0 - 0.2 10e9/L    Abs Immature Granulocytes 0.0 0 - 0.4 10e9/L    Absolute Nucleated RBC 0.0    Comprehensive metabolic panel    Collection Time: 01/15/18  8:40 AM   Result Value Ref Range    Sodium 138 133 - 144 mmol/L    Potassium 3.9 3.4 - 5.3 mmol/L    Chloride 104 94 - 109 mmol/L    Carbon Dioxide 28 20 - 32 mmol/L    Anion Gap 7 3 - 14 mmol/L    Glucose 137 (H) 70 - 99 mg/dL    Urea Nitrogen 13 7 - 30 mg/dL    Creatinine 0.60 0.52 - 1.04 mg/dL    GFR Estimate >90 >60 mL/min/1.7m2    GFR Estimate If Black >90 >60 mL/min/1.7m2    Calcium 8.9 8.5 - 10.1 mg/dL    Bilirubin Total 0.4 0.2 - 1.3 mg/dL    Albumin 3.6 3.4 - 5.0 g/dL    Protein Total 7.5 6.8 - 8.8 g/dL    Alkaline Phosphatase 60 40 - 150 U/L    ALT 41 0 - 50 U/L    AST 38 0 - 45 U/L               Follow-ups after your visit        Your next 10 appointments already scheduled     Jan 29, 2018  8:15 AM CST   [facility] Draw with  [facility] LAB DRAW   [facility] Lab Draw ([facility])    [address]  [address] [place] [id number] [phone]            Jan 29, 2018  8:40 AM CST   (Arrive by 8:25 AM)   Return Visit with Kellie Louis  FORREST Nobles   [facility] ([facility])    [address]  [address]  [facility] [address]   [phone]            Jan 29, 2018  9:00 AM CST   Infusion 360 with UC ONCOLOGY INFUSION, UC 26 ATC   [facility] ([facility])    [address]  [address]  [facility] [address]   [phone]            Jan 29, 2018  9:30 AM CST   New Patient Visit with Shivani Noel RD   [facility] ([facility])    [address] [place] [address]  [address]  [facility] [address]   [phone]            Feb 12, 2018  9:15 AM CST   Masonic Lab Draw with UC MASONIC LAB DRAW   [facility] Lab Draw ([facility])    [address] [place] [address]  [address]  [facility] [address]   [phone]            Feb 12, 2018  9:40 AM CST   (Arrive by 9:25 AM)   Return Visit with Kellie Nobles PA-C   [facility] ([facility])    [address] [place] [address]  [address]  [facility] [address]   [phone]            Feb 12, 2018 10:30 AM CST   Infusion 360 with UC ONCOLOGY INFUSION, UC 21 ATC   [facility] ([facility])    [address] [place] [address]  [address]  [facility] [address]   [phone]            Feb 26, 2018  8:00 AM CST   [facility] Lab Draw with UC [facility]ON[facility] LAB DRAW   [facility] Draw ([facility])    [address] [place] [address]  [address]  [facility] [address]   [phone]              Future tests that were ordered for you today     Open Future Orders        Priority Expected Expires Ordered    MA Screening Digital Bilateral Routine  1/15/2019 1/15/2018            Who to contact     If you have questions or need follow up information about today's clinic visit or your schedule please contact [facility] directly at  [phone].  Normal or non-critical lab and imaging results will be communicated to you by [employer], letter or phone within 4 business days after the clinic has received the results. If you do not hear from us within 7 days, please contact the clinic through [employer] or phone. If you have a critical or abnormal lab result, we will notify you by phone as soon as possible.  Submit refill requests through [employer]Stay or call your pharmacy and they will forward the refill request to us. Please allow 3 business days for your refill to be completed.          Additional Information About Your Visit        [employer] Information     Social[employer] gives you secure access to your electronic health record. If you see a primary care provider, you can also send messages to your care team and make appointments. If you have questions, please call your primary care clinic.  If you do not have a primary care provider, please call [phone] and they will assist you.        Care EveryWhere ID     This is your Care EveryWhere ID. This could be used by other organizations to access your [facility] medical records  [id number]         Blood Pressure from Last 3 Encounters:   01/15/18 112/76   01/12/18 95/60   01/08/18 116/80    Weight from Last 3 Encounters:   01/15/18 76.6 kg (168 lb 14.4 oz)   01/12/18 76.2 kg (168 lb)   01/08/18 76.5 kg (168 lb 11.2 oz)              We Performed the Following     *CBC with platelets differential     Cancer antigen 19-9     Comprehensive metabolic panel          Today's Medication Changes          These changes are accurate as of: 1/15/18  3:59 PM.  If you have any questions, ask your nurse or doctor.               Start taking these medicines.        Dose/Directions    dexamethasone 4 MG tablet   Commonly known as:  DECADRON   Used for:  Pancreatic adenocarcinoma (H)        Dose:  8 mg   Start taking on:  1/16/2018   Take 2 tablets (8 mg) by mouth daily for 3 days Days 2 through 4.   Quantity:  6 tablet    Refills:  2       loperamide 2 MG capsule   Commonly known as:  IMODIUM   Used for:  Pancreatic adenocarcinoma (H)        2 caps at 1st sign of diarrhea & 1 cap every 2hrs until 12hrs diarrhea free. During night, 2 caps at bedtime & 2 caps every 4hrs until AM   Quantity:  30 capsule   Refills:  0       LORazepam 0.5 MG tablet   Commonly known as:  ATIVAN   Used for:  Pancreatic adenocarcinoma (H)        Dose:  0.5 mg   Take 1 tablet (0.5 mg) by mouth every 4 hours as needed (Anxiety, Nausea/Vomiting or Sleep)   Quantity:  30 tablet   Refills:  2       ondansetron 8 MG tablet   Commonly known as:  ZOFRAN   Used for:  Pancreatic adenocarcinoma (H)   Started by:  Renate Mcnair PA-C        Dose:  8 mg   Take 1 tablet (8 mg) by mouth every 8 hours as needed for nausea   Quantity:  30 tablet   Refills:  0       prochlorperazine 10 MG tablet   Commonly known as:  COMPAZINE   Used for:  Pancreatic adenocarcinoma (H)        Dose:  10 mg   Take 1 tablet (10 mg) by mouth every 6 hours as needed (Nausea/Vomiting)   Quantity:  30 tablet   Refills:  2            Where to get your medicines      These medications were sent to [place] [address] [facility]  [address] [place] [address]    Hours:  TRANSPLANT PHONE NUMBER [phone] Phone:  [phone]     dexamethasone 4 MG tablet    loperamide 2 MG capsule    ondansetron 8 MG tablet    prochlorperazine 10 MG tablet         Some of these will need a paper prescription and others can be bought over the counter.  Ask your nurse if you have questions.     Bring a paper prescription for each of these medications     LORazepam 0.5 MG tablet                Primary Care Provider Office Phone # Fax #    Solange Mcgill [phone] [address] [facility] [address]        Equal Access to Services     CHASE DE GUZMAN AH: solange Stern qaybta  re barragancheng yip. Liz [facility] [place] [phone].    ATENCIÓN: Si odessa vasquez, tiene a quiros disposición servicios gratuitos de asistencia lingüística. Macy al [phone].    We comply with applicable federal civil rights laws and [place] laws. We do not discriminate on the basis of race, color, national origin, age, disability, sex, sexual orientation, or gender identity.            Thank you!     Thank you for choosing [facility]  for your care. Our goal is always to provide you with excellent care. Hearing back from our patients is one way we can continue to improve our services. Please take a few minutes to complete the written survey that you may receive in the mail after your visit with us. Thank you!             Your Updated Medication List - Protect others around you: Learn how to safely use, store and throw away your medicines at www.disposemymeds.org.          This list is accurate as of: 1/15/18  3:59 PM.  Always use your most recent med list.                   Brand Name Dispense Instructions for use Diagnosis    acetaminophen 325 MG tablet    TYLENOL    100 tablet    Take 2 tablets (650 mg) by mouth every 4 hours as needed for mild pain    Acute pancreatitis without infection or necrosis, unspecified pancreatitis type       amylase-lipase-protease 49962-[address] UNITS Cpep per EC capsule    CREON    450 capsule    Take 2-3 with meals / 1-2 with snacks, up to 15 per day.    Necrotizing pancreatitis       dexamethasone 4 MG tablet   Start taking on:  1/16/2018    DECADRON    6 tablet    Take 2 tablets (8 mg) by mouth daily for 3 days Days 2 through 4.    Pancreatic adenocarcinoma (H)       HYDROcodone-acetaminophen 5-325 MG per tablet    NORCO     Take 1 tablet by mouth every 6 hours as needed for moderate to severe pain        loperamide 2 MG capsule    IMODIUM    30 capsule    2 caps at 1st sign of diarrhea & 1 cap every 2hrs until 12hrs diarrhea  free. During night, 2 caps at bedtime & 2 caps every 4hrs until AM    Pancreatic adenocarcinoma (H)       LORazepam 0.5 MG tablet    ATIVAN    30 tablet    Take 1 tablet (0.5 mg) by mouth every 4 hours as needed (Anxiety, Nausea/Vomiting or Sleep)    Pancreatic adenocarcinoma (H)       ondansetron 8 MG tablet    ZOFRAN    30 tablet    Take 1 tablet (8 mg) by mouth every 8 hours as needed for nausea    Pancreatic adenocarcinoma (H)       prochlorperazine 10 MG tablet    COMPAZINE    30 tablet    Take 1 tablet (10 mg) by mouth every 6 hours as needed (Nausea/Vomiting)    Pancreatic adenocarcinoma (H)

## 2018-01-15 NOTE — PROGRESS NOTES
Infusion Nursing Note:  Kitty Bangura presents today for D1 C1 Oxaliplatin, Irinotecan, Fluorouracil push and pump connect.    Patient seen by provider today: Yes: DANTE Villegas    Intravenous Access:  Implanted Port.    Treatment Conditions:  Lab Results   Component Value Date    HGB 12.4 01/15/2018     Lab Results   Component Value Date    WBC 7.1 01/15/2018      Lab Results   Component Value Date    ANEU 3.7 01/15/2018     Lab Results   Component Value Date     01/15/2018      Lab Results   Component Value Date     01/15/2018                   Lab Results   Component Value Date    POTASSIUM 3.9 01/15/2018           Lab Results   Component Value Date    MAG 2.2 11/06/2017            Lab Results   Component Value Date    CR 0.60 01/15/2018                   Lab Results   Component Value Date    ILIANA 8.9 01/15/2018                Lab Results   Component Value Date    BILITOTAL 0.4 01/15/2018           Lab Results   Component Value Date    ALBUMIN 3.6 01/15/2018                    Lab Results   Component Value Date    ALT 41 01/15/2018           Lab Results   Component Value Date    AST 38 01/15/2018     Results reviewed, labs MET treatment parameters, ok to proceed with treatment.    Note: Patient is new to the infusion room today and is receiving FOLFIRINOX for the first time.  Patient oriented to infusion room, location of bathrooms and nutrition stations, and call light.  Received written chemotherapy information and new patient folder previously.  Verbally reviewed chemotherapy teaching, side effects, take-home medications, and follow-up schedule with patient. Patient instructed to call [facility] RN triage line with any questions or if patient experiences a temperature >100.5, shaking chills, uncontrolled nausea/vomiting/diarrhea, dizziness, shortness of breath, bleeding not relieved with pressure, or with any other concerns.  Instructed patient to call [facility] RN triage line  after hours.    Fluorouracil Cseries infusion pump connected at 1600  Positive blood return from port at time of pump hook up. Connections open and taped; verified with Tiffanie DIXON RN.  Pump to infuse over 46 hours at 5.2cc/hour. Pump will be disconnected on Wed. 1/17/18 @ 1400 by EVELYNE Drew.  to call Clinic on 1/16/18 to schedule appointment because clinic was closed at time this writer called. Patient aware of pump disconnect date and time.      About 45 minutes into Irinotecan infusion, patient reported a runny nose, flushing, and slight abdominal cramping. Infusion a stopped and atropine given. Symptoms resolved without further issue. Patient reports the she has been constipated and contributes the cramping to this.    Post Infusion Assessment:  Patient tolerated infusion without incident.  Blood return noted pre and post infusion.  Blood return noted during Fluouracil administration every 2 cc.  Site patent and intact, free from redness, edema or discomfort.  No evidence of extravasations.    Discharge Plan:   Prescription refills given for ativan, compazine, zofran, and imodium.  Discharge instructions reviewed with: Patient and Family.  Patient and/or family verbalized understanding of discharge instructions and all questions answered.  Copy of AVS reviewed with patient and/or family.  Patient will return 1/29/18 for next appointment.  Patient discharged in stable condition accompanied by: self and .  Departure Mode: Ambulatory.    Lidya Goodwin RN

## 2018-01-16 LAB — CANCER AG19-9 SERPL-ACNC: 12 U/ML (ref 0–37)

## 2018-01-16 NOTE — PROGRESS NOTES
This is a recent snapshot of the patient's [facility] medical record.  For current drug dose and complete information and questions, call [phone] or In [facility] pool, fv home infusion ([id number])  CSN Number:  [id number]

## 2018-01-17 ENCOUNTER — INFUSION THERAPY VISIT (OUTPATIENT)
Dept: INFUSION THERAPY | Facility: CLINIC | Age: 58
End: 2018-01-17
Attending: INTERNAL MEDICINE
Payer: COMMERCIAL

## 2018-01-17 VITALS — DIASTOLIC BLOOD PRESSURE: 75 MMHG | TEMPERATURE: 97.3 F | SYSTOLIC BLOOD PRESSURE: 116 MMHG | HEART RATE: 67 BPM

## 2018-01-17 DIAGNOSIS — N63.0 BREAST MASS: Primary | ICD-10-CM

## 2018-01-17 DIAGNOSIS — C25.9 PANCREATIC ADENOCARCINOMA (H): Primary | ICD-10-CM

## 2018-01-17 PROCEDURE — 25000128 H RX IP 250 OP 636: Performed by: INTERNAL MEDICINE

## 2018-01-17 RX ORDER — HEPARIN SODIUM (PORCINE) LOCK FLUSH IV SOLN 100 UNIT/ML 100 UNIT/ML
5 SOLUTION INTRAVENOUS
Status: DISCONTINUED | OUTPATIENT
Start: 2018-01-17 | End: 2018-01-17 | Stop reason: HOSPADM

## 2018-01-17 RX ADMIN — SODIUM CHLORIDE, PRESERVATIVE FREE 5 ML: 5 INJECTION INTRAVENOUS at 14:19

## 2018-01-17 NOTE — PROGRESS NOTES
Infusion Nursing Note:  Kitty M Willem presents today for pump d/c.    Patient seen by provider today: No   present during visit today: Not Applicable.    Note: N/A.    Intravenous Access:  Implanted Port.    Treatment Conditions:  Not Applicable.      Post Infusion Assessment:  Blood return noted.  Site patent and intact, free from redness, edema or discomfort.  No evidence of extravasations.  Access discontinued per protocol.    Discharge Plan:   Patient discharged in stable condition accompanied by: self.  Departure Mode: Ambulatory.    Janine Hercules RN

## 2018-01-17 NOTE — MR AVS SNAPSHOT
After Visit Summary   1/17/2018    Kitty Bangura    MRN: [id number]           Patient Information     Date Of Birth          1960        Visit Information        Provider Department      1/17/2018 2:00 PM ROOM 8 [facility]fusion        Today's Diagnoses     Pancreatic adenocarcinoma (H)    -  1       Follow-ups after your visit        Your next 10 appointments already scheduled     Jan 29, 2018  8:15 AM CST   [facility] Lab Draw with [facility] MASONIC LAB DRAW   [facility] Lab Draw ([facility])    [address] [place] [address]  [address]  [facility] [address]   [phone]            Jan 29, 2018  8:40 AM CST   (Arrive by 8:25 AM)   Return Visit with FORREST Ackerman [facility] ([facility])    [address] [place] [address]  [address]  [facility] [address]   [phone]            Jan 29, 2018  9:00 AM CST   Infusion 360 with UC ONCOLOGY INFUSION, UC 26 ATC   [facility] ([facility])    [address] [place] [address]  [address]  [facility] [address]   [phone]            Jan 29, 2018  9:30 AM CST   New Patient Visit with Shivani Noel RD   [facility] ([facility])    [address] [place] [address]  [address]  [facility] [address]   [phone]            Feb 12, 2018  9:15 AM CST   [facility] Lab Draw with [facility] LAB DRAW   [facility] Lab Draw ([facility])    [address] [place] [address]  [address]  [facility] [address]   [phone]            Feb 12, 2018  9:40 AM CST   (Arrive by 9:25 AM)   Return Visit with FORREST Ackerman [facility] ([facility])    [address] [place] [address]  [address]  [facility] [address]   [phone]            Feb 12, 2018 10:30 AM CST   Infusion 360 with UC ONCOLOGY INFUSION, UC 21 ATC   [facility]  Cancer Clinic ([facility])    [address]  [address]  [facility] [address] [phone]            Feb 26, 2018  8:00 AM [facility]   [facility] Lab Draw with  MASONIC LAB DRAW   [facility] Lab Draw ([facility])    [address]  [address]  [facility] [address]   [phone]              Future tests that were ordered for you today     Open Future Orders        Priority Expected Expires Ordered    MA Diagnostic Digital Bilateral Routine 1/17/2018 1/17/2019 1/17/2018            Who to contact     If you have questions or need follow up information about today's clinic visit or your schedule please contact [facility] directly at [phone].  Normal or non-critical lab and imaging results will be communicated to you by "[employer]hart, letter or phone within 4 business days after the clinic has received the results. If you do not hear from us within 7 days, please contact the clinic through "[employer]hart or phone. If you have a critical or abnormal lab result, we will notify you by phone as soon as possible.  Submit refill requests through [employer] or call your pharmacy and they will forward the refill request to us. Please allow 3 business days for your refill to be completed.          Additional Information About Your Visit        [employer] Information     [employer] gives you secure access to your electronic health record. If you see a primary care provider, you can also send messages to your care team and make appointments. If you have questions, please call your primary care clinic.  If you do not have a primary care provider, please call [phone] and they will assist you.        Care EveryWhere ID     This is your Care EveryWhere ID. This could be used by other organizations to access your [facility] medical records  [id number]        Your Vitals Were     Pulse Temperature                67 97.3  F (36.3  C) (Oral)           Blood Pressure  from Last 3 Encounters:   01/17/18 116/75   01/15/18 112/76   01/12/18 95/60    Weight from Last 3 Encounters:   01/15/18 76.6 kg (168 lb 14.4 oz)   01/12/18 76.2 kg (168 lb)   01/08/18 76.5 kg (168 lb 11.2 oz)              Today, you had the following     No orders found for display       Primary Care Provider Office Phone # Fax #    Deejayshari Julito Mcgill [phone] [address] [address]        Equal Access to Services     [facility]: Hadii zaheer donovan Sopaulo, wajosephineda grady, qaotiliata kaalmada marty, re gerardo . So [facility] [phone].    ATENCIÓN: Si habla español, tiene a quiros disposición servicios gratuitos de asistencia lingüística. [facility] [phone].    We comply with applicable federal civil rights laws and [place] laws. We do not discriminate on the basis of race, color, national origin, age, disability, sex, sexual orientation, or gender identity.            Thank you!     Thank you for choosing [facility]  for your care. Our goal is always to provide you with excellent care. Hearing back from our patients is one way we can continue to improve our services. Please take a few minutes to complete the written survey that you may receive in the mail after your visit with us. Thank you!             Your Updated Medication List - Protect others around you: Learn how to safely use, store and throw away your medicines at www.disposemymeds.org.          This list is accurate as of: 1/17/18  2:23 PM.  Always use your most recent med list.                   Brand Name Dispense Instructions for use Diagnosis    acetaminophen 325 MG tablet    TYLENOL    100 tablet    Take 2 tablets (650 mg) by mouth every 4 hours as needed for mild pain    Acute pancreatitis without infection or necrosis, unspecified pancreatitis type       amylase-lipase-protease 02160-[address]93 UNITS Cpep per EC capsule    CREON    450 capsule    Take 2-3  with meals / 1-2 with snacks, up to 15 per day.    Necrotizing pancreatitis       dexamethasone 4 MG tablet    DECADRON    6 tablet    Take 2 tablets (8 mg) by mouth daily for 3 days Days 2 through 4.    Pancreatic adenocarcinoma (H)       HYDROcodone-acetaminophen 5-325 MG per tablet    NORCO     Take 1 tablet by mouth every 6 hours as needed for moderate to severe pain        loperamide 2 MG capsule    IMODIUM    30 capsule    2 caps at 1st sign of diarrhea & 1 cap every 2hrs until 12hrs diarrhea free. During night, 2 caps at bedtime & 2 caps every 4hrs until AM    Pancreatic adenocarcinoma (H)       LORazepam 0.5 MG tablet    ATIVAN    30 tablet    Take 1 tablet (0.5 mg) by mouth every 4 hours as needed (Anxiety, Nausea/Vomiting or Sleep)    Pancreatic adenocarcinoma (H)       ondansetron 8 MG tablet    ZOFRAN    30 tablet    Take 1 tablet (8 mg) by mouth every 8 hours as needed for nausea    Pancreatic adenocarcinoma (H)       prochlorperazine 10 MG tablet    COMPAZINE    30 tablet    Take 1 tablet (10 mg) by mouth every 6 hours as needed (Nausea/Vomiting)    Pancreatic adenocarcinoma (H)

## 2018-01-18 NOTE — TELEPHONE ENCOUNTER
Submitted case via [facility] as faxing express scripts has not been received; [facility] [id number]

## 2018-01-19 ENCOUNTER — TELEPHONE (OUTPATIENT)
Dept: ONCOLOGY | Facility: CLINIC | Age: 58
End: 2018-01-19

## 2018-01-19 NOTE — TELEPHONE ENCOUNTER
[facility] Telephone Triage Note    Assessment: Patient called in to triage reporting the following symptoms: constipation, last bowel movement 1/16/18.    Recommendations:  Provided home care advice- magnesium citrate and senna.  Pt reports she has tried mag citrate before with success.     Follow-Up: Instructed patient to seek care immediately for worsening symptoms, including: fever, chest pain, shortness of breath, dizziness. Patient voiced understanding of advice and/or instructions given.

## 2018-01-20 ENCOUNTER — HOSPITAL ENCOUNTER (INPATIENT)
Facility: CLINIC | Age: 58
LOS: 7 days | Discharge: HOME | DRG: 435 | End: 2018-01-27
Attending: INTERNAL MEDICINE | Admitting: INTERNAL MEDICINE
Payer: COMMERCIAL

## 2018-01-20 ENCOUNTER — APPOINTMENT (OUTPATIENT)
Dept: CT IMAGING | Facility: CLINIC | Age: 58
DRG: 435 | End: 2018-01-20
Attending: INTERNAL MEDICINE
Payer: COMMERCIAL

## 2018-01-20 ENCOUNTER — NURSE TRIAGE (OUTPATIENT)
Dept: NURSING | Facility: CLINIC | Age: 58
End: 2018-01-20

## 2018-01-20 ENCOUNTER — HOME INFUSION (PRE-WILLOW HOME INFUSION) (OUTPATIENT)
Dept: PHARMACY | Facility: CLINIC | Age: 58
End: 2018-01-20

## 2018-01-20 DIAGNOSIS — C25.9 PANCREATIC ADENOCARCINOMA (H): ICD-10-CM

## 2018-01-20 DIAGNOSIS — K85.91 NECROTIZING PANCREATITIS: ICD-10-CM

## 2018-01-20 LAB
ALBUMIN SERPL-MCNC: 3.2 G/DL (ref 3.4–5)
ALBUMIN UR-MCNC: 10 MG/DL
ALP SERPL-CCNC: 73 U/L (ref 40–150)
ALT SERPL W P-5'-P-CCNC: 42 U/L (ref 0–50)
ANION GAP SERPL CALCULATED.3IONS-SCNC: 6 MMOL/L (ref 3–14)
APPEARANCE UR: CLEAR
AST SERPL W P-5'-P-CCNC: 18 U/L (ref 0–45)
BASOPHILS # BLD AUTO: 0 10E9/L (ref 0–0.2)
BASOPHILS NFR BLD AUTO: 0.1 %
BILIRUB SERPL-MCNC: 1 MG/DL (ref 0.2–1.3)
BILIRUB UR QL STRIP: NEGATIVE
BUN SERPL-MCNC: 22 MG/DL (ref 7–30)
CALCIUM SERPL-MCNC: 8.6 MG/DL (ref 8.5–10.1)
CHLORIDE SERPL-SCNC: 104 MMOL/L (ref 94–109)
CO2 SERPL-SCNC: 28 MMOL/L (ref 20–32)
COLOR UR AUTO: YELLOW
CREAT SERPL-MCNC: 0.64 MG/DL (ref 0.52–1.04)
DIFFERENTIAL METHOD BLD: NORMAL
EOSINOPHIL # BLD AUTO: 0 10E9/L (ref 0–0.7)
EOSINOPHIL NFR BLD AUTO: 0.4 %
ERYTHROCYTE [DISTWIDTH] IN BLOOD BY AUTOMATED COUNT: 13.8 % (ref 10–15)
GFR SERPL CREATININE-BSD FRML MDRD: >90 ML/MIN/1.7M2
GLUCOSE SERPL-MCNC: 98 MG/DL (ref 70–99)
GLUCOSE UR STRIP-MCNC: NEGATIVE MG/DL
HCT VFR BLD AUTO: 38.7 % (ref 35–47)
HGB BLD-MCNC: 12.4 G/DL (ref 11.7–15.7)
HGB UR QL STRIP: NEGATIVE
IMM GRANULOCYTES # BLD: 0 10E9/L (ref 0–0.4)
IMM GRANULOCYTES NFR BLD: 0.1 %
INR PPP: 1.08 (ref 0.86–1.14)
KETONES UR STRIP-MCNC: NEGATIVE MG/DL
LACTATE BLD-SCNC: 1 MMOL/L (ref 0.7–2)
LEUKOCYTE ESTERASE UR QL STRIP: NEGATIVE
LIPASE SERPL-CCNC: 103 U/L (ref 73–393)
LYMPHOCYTES # BLD AUTO: 2.8 10E9/L (ref 0.8–5.3)
LYMPHOCYTES NFR BLD AUTO: 30.4 %
MAGNESIUM SERPL-MCNC: 2.5 MG/DL (ref 1.6–2.3)
MCH RBC QN AUTO: 29.9 PG (ref 26.5–33)
MCHC RBC AUTO-ENTMCNC: 32 G/DL (ref 31.5–36.5)
MCV RBC AUTO: 93 FL (ref 78–100)
MONOCYTES # BLD AUTO: 0.2 10E9/L (ref 0–1.3)
MONOCYTES NFR BLD AUTO: 1.6 %
MUCOUS THREADS #/AREA URNS LPF: PRESENT /LPF
NEUTROPHILS # BLD AUTO: 6.2 10E9/L (ref 1.6–8.3)
NEUTROPHILS NFR BLD AUTO: 67.4 %
NITRATE UR QL: NEGATIVE
NRBC # BLD AUTO: 0 10*3/UL
NRBC BLD AUTO-RTO: 0 /100
PH UR STRIP: 6.5 PH (ref 5–7)
PLATELET # BLD AUTO: 257 10E9/L (ref 150–450)
POTASSIUM SERPL-SCNC: 3.9 MMOL/L (ref 3.4–5.3)
PROT SERPL-MCNC: 7 G/DL (ref 6.8–8.8)
RBC # BLD AUTO: 4.15 10E12/L (ref 3.8–5.2)
RBC #/AREA URNS AUTO: 1 /HPF (ref 0–2)
SODIUM SERPL-SCNC: 139 MMOL/L (ref 133–144)
SOURCE: ABNORMAL
SP GR UR STRIP: 1.02 (ref 1–1.03)
UROBILINOGEN UR STRIP-MCNC: NORMAL MG/DL (ref 0–2)
WBC # BLD AUTO: 9.2 10E9/L (ref 4–11)
WBC #/AREA URNS AUTO: 1 /HPF (ref 0–2)

## 2018-01-20 PROCEDURE — 83605 ASSAY OF LACTIC ACID: CPT | Performed by: INTERNAL MEDICINE

## 2018-01-20 PROCEDURE — 25000128 H RX IP 250 OP 636: Performed by: INTERNAL MEDICINE

## 2018-01-20 PROCEDURE — 12000001 ZZH R&B MED SURG/OB UMMC

## 2018-01-20 PROCEDURE — 99223 1ST HOSP IP/OBS HIGH 75: CPT | Mod: AI | Performed by: INTERNAL MEDICINE

## 2018-01-20 PROCEDURE — 74177 CT ABD & PELVIS W/CONTRAST: CPT

## 2018-01-20 PROCEDURE — 85610 PROTHROMBIN TIME: CPT | Performed by: INTERNAL MEDICINE

## 2018-01-20 PROCEDURE — 96376 TX/PRO/DX INJ SAME DRUG ADON: CPT

## 2018-01-20 PROCEDURE — 80053 COMPREHEN METABOLIC PANEL: CPT | Performed by: INTERNAL MEDICINE

## 2018-01-20 PROCEDURE — 83690 ASSAY OF LIPASE: CPT | Performed by: INTERNAL MEDICINE

## 2018-01-20 PROCEDURE — 96375 TX/PRO/DX INJ NEW DRUG ADDON: CPT

## 2018-01-20 PROCEDURE — 83735 ASSAY OF MAGNESIUM: CPT | Performed by: INTERNAL MEDICINE

## 2018-01-20 PROCEDURE — 81001 URINALYSIS AUTO W/SCOPE: CPT | Performed by: INTERNAL MEDICINE

## 2018-01-20 PROCEDURE — 99285 EMERGENCY DEPT VISIT HI MDM: CPT | Mod: Z6 | Performed by: INTERNAL MEDICINE

## 2018-01-20 PROCEDURE — 25000125 ZZHC RX 250: Performed by: STUDENT IN AN ORGANIZED HEALTH CARE EDUCATION/TRAINING PROGRAM

## 2018-01-20 PROCEDURE — 85025 COMPLETE CBC W/AUTO DIFF WBC: CPT | Performed by: INTERNAL MEDICINE

## 2018-01-20 PROCEDURE — 25000128 H RX IP 250 OP 636: Performed by: STUDENT IN AN ORGANIZED HEALTH CARE EDUCATION/TRAINING PROGRAM

## 2018-01-20 PROCEDURE — 96374 THER/PROPH/DIAG INJ IV PUSH: CPT

## 2018-01-20 PROCEDURE — 96361 HYDRATE IV INFUSION ADD-ON: CPT

## 2018-01-20 PROCEDURE — 99285 EMERGENCY DEPT VISIT HI MDM: CPT | Mod: 25

## 2018-01-20 RX ORDER — LORAZEPAM 0.5 MG/1
.5-1 TABLET ORAL EVERY 6 HOURS PRN
Status: DISCONTINUED | OUTPATIENT
Start: 2018-01-20 | End: 2018-01-27 | Stop reason: HOSPADM

## 2018-01-20 RX ORDER — LIDOCAINE 40 MG/G
CREAM TOPICAL
Status: DISCONTINUED | OUTPATIENT
Start: 2018-01-20 | End: 2018-01-27 | Stop reason: HOSPADM

## 2018-01-20 RX ORDER — HYDROMORPHONE HYDROCHLORIDE 1 MG/ML
.3-.5 INJECTION, SOLUTION INTRAMUSCULAR; INTRAVENOUS; SUBCUTANEOUS
Status: DISCONTINUED | OUTPATIENT
Start: 2018-01-20 | End: 2018-01-21

## 2018-01-20 RX ORDER — SODIUM CHLORIDE, SODIUM LACTATE, POTASSIUM CHLORIDE, CALCIUM CHLORIDE 600; 310; 30; 20 MG/100ML; MG/100ML; MG/100ML; MG/100ML
INJECTION, SOLUTION INTRAVENOUS CONTINUOUS
Status: DISCONTINUED | OUTPATIENT
Start: 2018-01-20 | End: 2018-01-21

## 2018-01-20 RX ORDER — METOCLOPRAMIDE HYDROCHLORIDE 5 MG/ML
5 INJECTION INTRAMUSCULAR; INTRAVENOUS ONCE
Status: COMPLETED | OUTPATIENT
Start: 2018-01-20 | End: 2018-01-20

## 2018-01-20 RX ORDER — OXYCODONE HYDROCHLORIDE 5 MG/1
5 TABLET ORAL EVERY 6 HOURS PRN
Status: DISCONTINUED | OUTPATIENT
Start: 2018-01-20 | End: 2018-01-25

## 2018-01-20 RX ORDER — NALOXONE HYDROCHLORIDE 0.4 MG/ML
.1-.4 INJECTION, SOLUTION INTRAMUSCULAR; INTRAVENOUS; SUBCUTANEOUS
Status: DISCONTINUED | OUTPATIENT
Start: 2018-01-20 | End: 2018-01-27 | Stop reason: HOSPADM

## 2018-01-20 RX ORDER — HEPARIN SODIUM,PORCINE 10 UNIT/ML
5-10 VIAL (ML) INTRAVENOUS EVERY 24 HOURS
Status: DISCONTINUED | OUTPATIENT
Start: 2018-01-20 | End: 2018-01-27 | Stop reason: HOSPADM

## 2018-01-20 RX ORDER — ONDANSETRON 4 MG/1
8 TABLET, ORALLY DISINTEGRATING ORAL EVERY 8 HOURS PRN
Status: DISCONTINUED | OUTPATIENT
Start: 2018-01-20 | End: 2018-01-27 | Stop reason: HOSPADM

## 2018-01-20 RX ORDER — ACETAMINOPHEN 325 MG/1
650 TABLET ORAL EVERY 4 HOURS PRN
Status: DISCONTINUED | OUTPATIENT
Start: 2018-01-20 | End: 2018-01-27 | Stop reason: HOSPADM

## 2018-01-20 RX ORDER — HYDROMORPHONE HYDROCHLORIDE 1 MG/ML
0.5 INJECTION, SOLUTION INTRAMUSCULAR; INTRAVENOUS; SUBCUTANEOUS ONCE
Status: COMPLETED | OUTPATIENT
Start: 2018-01-20 | End: 2018-01-20

## 2018-01-20 RX ORDER — ONDANSETRON 8 MG/1
8 TABLET, FILM COATED ORAL EVERY 8 HOURS PRN
Status: DISCONTINUED | OUTPATIENT
Start: 2018-01-20 | End: 2018-01-27 | Stop reason: HOSPADM

## 2018-01-20 RX ORDER — HEPARIN SODIUM,PORCINE 10 UNIT/ML
5-10 VIAL (ML) INTRAVENOUS
Status: DISCONTINUED | OUTPATIENT
Start: 2018-01-20 | End: 2018-01-27 | Stop reason: HOSPADM

## 2018-01-20 RX ORDER — POLYETHYLENE GLYCOL 3350 17 G/17G
17 POWDER, FOR SOLUTION ORAL DAILY PRN
Status: DISCONTINUED | OUTPATIENT
Start: 2018-01-20 | End: 2018-01-27 | Stop reason: HOSPADM

## 2018-01-20 RX ORDER — AMOXICILLIN 250 MG
1-2 CAPSULE ORAL DAILY PRN
Status: DISCONTINUED | OUTPATIENT
Start: 2018-01-20 | End: 2018-01-27 | Stop reason: HOSPADM

## 2018-01-20 RX ORDER — LORAZEPAM 2 MG/ML
.5-1 INJECTION INTRAMUSCULAR EVERY 6 HOURS PRN
Status: DISCONTINUED | OUTPATIENT
Start: 2018-01-20 | End: 2018-01-27 | Stop reason: HOSPADM

## 2018-01-20 RX ORDER — HYDROMORPHONE HYDROCHLORIDE 1 MG/ML
.3-.5 INJECTION, SOLUTION INTRAMUSCULAR; INTRAVENOUS; SUBCUTANEOUS
Status: DISCONTINUED | OUTPATIENT
Start: 2018-01-20 | End: 2018-01-20

## 2018-01-20 RX ORDER — HEPARIN SODIUM (PORCINE) LOCK FLUSH IV SOLN 100 UNIT/ML 100 UNIT/ML
5 SOLUTION INTRAVENOUS
Status: DISCONTINUED | OUTPATIENT
Start: 2018-01-20 | End: 2018-01-27 | Stop reason: HOSPADM

## 2018-01-20 RX ORDER — ONDANSETRON 2 MG/ML
8 INJECTION INTRAMUSCULAR; INTRAVENOUS EVERY 8 HOURS PRN
Status: DISCONTINUED | OUTPATIENT
Start: 2018-01-20 | End: 2018-01-27 | Stop reason: HOSPADM

## 2018-01-20 RX ORDER — PROCHLORPERAZINE MALEATE 5 MG
5-10 TABLET ORAL EVERY 6 HOURS PRN
Status: DISCONTINUED | OUTPATIENT
Start: 2018-01-20 | End: 2018-01-27 | Stop reason: HOSPADM

## 2018-01-20 RX ORDER — IOPAMIDOL 755 MG/ML
100 INJECTION, SOLUTION INTRAVASCULAR ONCE
Status: COMPLETED | OUTPATIENT
Start: 2018-01-20 | End: 2018-01-20

## 2018-01-20 RX ADMIN — HYDROMORPHONE HYDROCHLORIDE 0.5 MG: 1 INJECTION, SOLUTION INTRAMUSCULAR; INTRAVENOUS; SUBCUTANEOUS at 15:40

## 2018-01-20 RX ADMIN — HYDROMORPHONE HYDROCHLORIDE 0.5 MG: 1 INJECTION, SOLUTION INTRAMUSCULAR; INTRAVENOUS; SUBCUTANEOUS at 18:21

## 2018-01-20 RX ADMIN — HYDROMORPHONE HYDROCHLORIDE 0.5 MG: 1 INJECTION, SOLUTION INTRAMUSCULAR; INTRAVENOUS; SUBCUTANEOUS at 22:24

## 2018-01-20 RX ADMIN — PROCHLORPERAZINE EDISYLATE 5 MG: 5 INJECTION INTRAMUSCULAR; INTRAVENOUS at 22:23

## 2018-01-20 RX ADMIN — ONDANSETRON 8 MG: 2 INJECTION INTRAMUSCULAR; INTRAVENOUS at 18:02

## 2018-01-20 RX ADMIN — SODIUM CHLORIDE, PRESERVATIVE FREE 75 ML: 5 INJECTION INTRAVENOUS at 14:05

## 2018-01-20 RX ADMIN — SODIUM CHLORIDE, POTASSIUM CHLORIDE, SODIUM LACTATE AND CALCIUM CHLORIDE 1000 ML: 600; 310; 30; 20 INJECTION, SOLUTION INTRAVENOUS at 12:26

## 2018-01-20 RX ADMIN — HYDROMORPHONE HYDROCHLORIDE 0.5 MG: 1 INJECTION, SOLUTION INTRAMUSCULAR; INTRAVENOUS; SUBCUTANEOUS at 12:55

## 2018-01-20 RX ADMIN — METOCLOPRAMIDE 5 MG: 5 INJECTION, SOLUTION INTRAMUSCULAR; INTRAVENOUS at 12:26

## 2018-01-20 RX ADMIN — IOPAMIDOL 100 ML: 755 INJECTION, SOLUTION INTRAVENOUS at 14:05

## 2018-01-20 RX ADMIN — SODIUM CHLORIDE, POTASSIUM CHLORIDE, SODIUM LACTATE AND CALCIUM CHLORIDE: 600; 310; 30; 20 INJECTION, SOLUTION INTRAVENOUS at 15:41

## 2018-01-20 RX ADMIN — HYDROMORPHONE HYDROCHLORIDE 0.5 MG: 1 INJECTION, SOLUTION INTRAMUSCULAR; INTRAVENOUS; SUBCUTANEOUS at 20:22

## 2018-01-20 ASSESSMENT — ACTIVITIES OF DAILY LIVING (ADL)
TOILETING: 0-->INDEPENDENT
SWALLOWING: 0-->SWALLOWS FOODS/LIQUIDS WITHOUT DIFFICULTY
DRESS: 0-->INDEPENDENT
TRANSFERRING: 0-->INDEPENDENT
FALL_HISTORY_WITHIN_LAST_SIX_MONTHS: NO
AMBULATION: 0-->INDEPENDENT
RETIRED_COMMUNICATION: 0-->UNDERSTANDS/COMMUNICATES WITHOUT DIFFICULTY
BATHING: 0-->INDEPENDENT
RETIRED_EATING: 0-->INDEPENDENT
COGNITION: 0 - NO COGNITION ISSUES REPORTED

## 2018-01-20 ASSESSMENT — ENCOUNTER SYMPTOMS
APPETITE CHANGE: 1
CONSTIPATION: 0
VOMITING: 1
DIARRHEA: 0
NAUSEA: 1
ABDOMINAL PAIN: 1
FEVER: 0

## 2018-01-20 ASSESSMENT — PAIN DESCRIPTION - DESCRIPTORS
DESCRIPTORS: STABBING;THROBBING
DESCRIPTORS: THROBBING;SHOOTING;SHARP
DESCRIPTORS: STABBING;THROBBING

## 2018-01-20 NOTE — IP AVS SNAPSHOT
[address] [address]    Phone:  [phone]                                       After Visit Summary   1/20/2018    Kitty Bangura    MRN: [id number]           After Visit Summary Signature Page     I have received my discharge instructions, and my questions have been answered. I have discussed any challenges I see with this plan with the nurse or doctor.    ..........................................................................................................................................  Patient/Patient Representative Signature      ..........................................................................................................................................  Patient Representative Print Name and Relationship to Patient    ..................................................               ................................................  Date                                            Time    ..........................................................................................................................................  Reviewed by Signature/Title    ...................................................              ..............................................  Date                                                            Time

## 2018-01-20 NOTE — PHARMACY-ADMISSION MEDICATION HISTORY
"Admission medication history interview status for the 1/20/2018 admission is complete. See Epic admission navigator for allergy information, pharmacy, prior to admission medications and immunization status.     Medication history interview sources:  Patient    Changes made to PTA medication list (reason)  Added: N/A (per pt)  Deleted:   -dexamethasone tablet (Per pt, completed therapy.)  Changed: N/A (per pt)    Additional medication history information (including reliability of information, actions taken by pharmacist):  -Pt was able to confirm medication dosing regimens and last doses taken.  -loperamide: Per pt, no doses needed to be taken yet but have home supply available.  -Norco: Per pt, \"tried one\" tablet yesterday but \"it didn't help.\" Pt reported her \"tooth was pulled before [her] port\" appointment.  -Per pt, she took to address constipation yesterday: \"one dose\" of Maalox suspension (strength unknown), \"half the bottle\" of magnesium citrate (exact amount unknown), and \"2 ExLax\" (senna) yesterday for one-time doses. Pt reported this combination was effective in addressing constipation.  -Pt denied other medications, prescription and over-the-counter.  -Allergies confirmed, per pt.  -Preferred pharmacy updated, per pt.  -Per MI, pt has documented influenza immunization this season.      Prior to Admission medications    Medication Sig Last Dose Taking? Auth Provider   ondansetron (ZOFRAN) 8 MG tablet Take 1 tablet (8 mg) by mouth every 8 hours as needed for nausea 1/19/2018 at AM Yes Renate Mcnair PA-C   LORazepam (ATIVAN) 0.5 MG tablet Take 1 tablet (0.5 mg) by mouth every 4 hours as needed (Anxiety, Nausea/Vomiting or Sleep) 1/20/2018 at 0400 Yes Fadi Ponce MD   prochlorperazine (COMPAZINE) 10 MG tablet Take 1 tablet (10 mg) by mouth every 6 hours as needed (Nausea/Vomiting) Past Week at Unknown time Yes Fadi Ponce MD   HYDROcodone-acetaminophen (NORCO) 5-325 MG per tablet " Take 1 tablet by mouth every 6 hours as needed for moderate to severe pain 1/19/2018 at Unknown time Yes Reported, Patient   amylase-lipase-protease (CREON) [id number] UNITS CPEP per EC capsule Take 2-3 with meals / 1-2 with snacks, up to 15 per day. 1/19/2018 at 1400 Yes Vito Sanches MD   loperamide (IMODIUM) 2 MG capsule 2 caps at 1st sign of diarrhea & 1 cap every 2hrs until 12hrs diarrhea free. During night, 2 caps at bedtime & 2 caps every 4hrs until AM not taken at Unknown time  Fadi Ponce MD   acetaminophen (TYLENOL) 325 MG tablet Take 2 tablets (650 mg) by mouth every 4 hours as needed for mild pain Unknown at Unknown time  Belen Lord MD         Medication history completed by: Dora Hunter

## 2018-01-20 NOTE — H&P
[facility], [place]    Hematology / Oncology  Admission History & Physical     Date of Admission:  01/20/18  Date of Service: 01/20/18  Primary Hematologist/Oncologist: Dr. Monk    Assessment & Plan   Kitty Bangura is a 57 year old female with recently diagnosed pancreatic adenocarcinoma, started on cycle 1 FOLFIRINOX on 1/15/2018. Her course has been complicated by walled-off pancreatic necrosis and infected necrotizing pancreatitis for which she was hospitalized in December (requiring endoscopic necrosectomy). She presented to the ED for evaluation of a 3 day history of worsening abdominal pain, nausea and vomiting, and was found to have imaging findings consistent with acute pancreatitis.    Abdominal pain, nausea and vomiting - suspect secondary to acute pancreatitis.  Patient reports a 3 day history of worsening abdominal pain, now associated with nausea and vomiting over the last day or 2 and has been unable to eat or drink anything since yesterday. Labs and vitals are reassuring - no leukocytosis, lactate is not elevated, she is afebrile and hemodynamically stable. Lipase is not elevated. CT abd/pelvis this admission shows findings consistent with acute pancreatitis with increased peripancreatic inflammation and new peripancreatic fluid collections. There is no definite pancreatic necrosis, but these collections may represent peripancreatic acute necrotic collections or pseudocysts (per radiology).    NPO with IV fluids for now (LR at 100 ml/hr).    Anti-emetics PRN.    Pain control with hydromorphone 0.3-0.5 mg IV Q2H PRN.    Defer antibiotics for now, but low threshold to start if she becomes febrile or decompensates clinically. Would likely cover with a combination of cefepime and metronidazole in this setting.    Pancreatic adenocarcinoma.  Recently diagnosed after she presented with abdominal pain in November 2017. She follows with Dr. Monk and was recently started on cycle  1 FOLFIRINOX on 1/15/2018. Her course has been complicated by walled-off pancreatic necrosis and infected necrotizing pancreatitis, for which she was hospitalized 12/9 to 12/13/2017 and underwent endoscopic necrosectomy. Imaging this admission most consistent with acute pancreatitis, with the previously demonstrated pancreatic tail mass appearing stable.    Holding home pancreatic enzyme supplements while not eating.    FEN: NPO, LR at 100 ml/hr, replete lytes PRN  Prophylaxis: mechanical  Code: FULL  Disposition: Admitted to the oncology service for management of acute pancreatitis versus necrotizing pancreatitis in the setting of recent initiation of chemotherapy for pancreatic adenocarcinoma. Anticipate discharge to home in the next several days pending clinical improvement.    Antonieta Zarate MD/PhD  Heme/Onc/Transplant Hospitalist    Chief Complaint   3 day history of worsening abdominal pain, associated with nausea and vomiting over the last 2 days.  - History obtained from the patient and through chart review.    History of Present Illness   Kitty Bangura is a 57 year old female with recently diagnosed pancreatic adenocarcinoma, started on cycle 1 FOLFIRINOX on 1/15/2018. She was diagnosed in November 2017 after she presented with abdominal pain and CT scan showed acute pancreatitis. A pancreatic tail mass was biopsied via EUS on 11/29/2017, consistent with pancreatic adenocarcinoma. Her course has been complicated by walled-off pancreatic necrosis and infected necrotizing pancreatitis (hospitalized 12/9/2017 through 12/13/2017) requiring antibiotics and endoscopic necrosectomy. She presented to the ED today for evaluation of a 3 day history of worsening abdominal pain, nausea, vomiting and inability to tolerate oral intake. The pain started in her lower abdomen and has spread upward, and radiates into her back. Pain is exacerbated with any movement, and is worse after eating. She has felt constipated  over the last 5 days, but had a normal BM yesterday (which helped relieve some of her symptoms, but the pain has come back). She reports that she is not passing much gas. The symptoms do not feel like typical constipation. She had nausea and vomiting last night, but has not vomited today and has not eaten or had anything to drink today. She denies any fevers or chills. This pain is reminiscent of the pain she had when she was initially diagnosed with pancreatic cancer.    Upon arrival in the ED, she was found to be afebrile, HR 70, /68. Lab workup revealed no significant leukocytosis (WBC 9.2), normal lactate of 1.0, normal BMP, and lipase normal at 103. CT abd/pelvis revealed findings consistent with acute pancreatitis with increased peripancreatic inflammation and new peripancreatic fluid collections. There is no definite pancreatic necrosis, but these collections may represent peripancreatic acute necrotic collection or pseudocyst (per radiology). The previously noted pancreatic tail mass is not significantly changed from prior imaging. She was given IV fluids, antiemetics, and hydromorphone for pain.  Admitted to the oncology service for further cares.    Heme/Onc History (adapted from most recent clinic note):  Kitty Bangura is a 57 year old woman, previously healthy until she presented in early November 2017 with abdominal pain. CT abdomen on 11/1/17 showed acute pancreatitis (lipase 41,960) and also a 3 cm heterogenous pancreatic tail mass. The etiology of pancreatitis was unclear - no obstructing gallstone and not a heavy drinker. She was hospitalized for one week and followed up with Dr. González Metz in GI clinic.   - On 11/29/17 she had endoscopic drainage of walled-off area of pancreatic necrosis by Dr. Metz. During the same procedure she had an EUS FNA of the pancreatic tail mass and pathology showed adenocarcinoma. The mass measured 33 x 27 mm, encapsulated with solid and cystic components,  rim calcification, and no evidence of invasion.   - Of note, an abdominal MRI in 2004 showed a 2.5 cm cystic lesion in the tail of the pancreas. She had no interval imaging between 2004 and 2017.   - Her case was discussed at tumor conference on 12/4/17 with plan to complete staging and refer to medical and surgical oncology.   - Staging CT chest/abd/pelvis on 12/9/17 showed several small pulmonary nodules (largest 3 mm), unchanged mass in the pancreatic tail, mildly prominent mesenteric nodes thought likely reactive, and small right hepatic lobe hypodensities. Abdominal MRI on 12/10/17 showed hepatic hemangiomas, no evidence of metastatic disease to the liver.   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  - Kitty met with Dr Monk and discussed neoadjuvant chemotherapy with FOLFIRINOX, then surgery and then adjuvant chemotherapy.   - C1 D1 of FOLFIRINOX was 1/15/17.    Past Medical History    I have reviewed this patient's medical history and updated it with pertinent information if needed.   Past Medical History:   Diagnosis Date     Cancer (H)      Necrotizing pancreatitis      PONV (postoperative nausea and vomiting)        Past Surgical History   I have reviewed this patient's surgical history and updated it with pertinent information if needed.  Past Surgical History:   Procedure Laterality Date     ABDOMEN SURGERY  1983    Ruptured tubal pregnancies, additional surgeries followed     BACK SURGERY  2004    L5, S1 discectomy     BREAST SURGERY  2003    Breast reduction     COLONOSCOPY  2008     ENDOSCOPIC ULTRASOUND, ESOPHAGOSCOPY, GASTROSCOPY, DUODENOSCOPY (EGD), NECROSECTOMY N/A 12/4/2017    Procedure: ENDOSCOPIC ULTRASOUND, ESOPHAGOSCOPY, GASTROSCOPY, DUODENOSCOPY (EGD), NECROSECTOMY;  Esophagogastroduodenoscopy, with  Necrosectomy and stents replacement  ;  Surgeon: González Metz MD;  Location:  OR      ENDOSCOPIC ULTRASOUND, ESOPHAGOSCOPY, GASTROSCOPY, DUODENOSCOPY (EGD), NECROSECTOMY N/A 2017    Procedure: ENDOSCOPIC ULTRASOUND, ESOPHAGOSCOPY, GASTROSCOPY, DUODENOSCOPY (EGD), NECROSECTOMY;  Esophagogastroduodenoscopy with Necrosectomy, Balloon Dilation, stent removal X3 and Cystgastrostomy stent Placement X2;  Surgeon: Guru Cecilia Staples MD;  Location: [facility] OR     ESOPHAGOSCOPY, GASTROSCOPY, DUODENOSCOPY (EGD), COMBINED N/A 2017    Procedure: COMBINED ENDOSCOPIC ULTRASOUND, ESOPHAGOSCOPY, GASTROSCOPY, DUODENOSCOPY (EGD), FINE NEEDLE ASPIRATE/BIOPSY;  Endoscopic Ultrasound with cystgastrostomy and fine needle aspiration ;  Surgeon: González Metz MD;  Location: [facility] OR     GYN SURGERY  1983    Multiple ectopic pregnancies, reconnect,  1988     INSERT PORT VASCULAR ACCESS Right 2018    Procedure: INSERT PORT VASCULAR ACCESS;  Chest Port Placement - right;  Surgeon: Chanda Lawson PA-C;  Location:  OR       Prior to Admission Medications   Prior to Admission Medications   Prescriptions Last Dose Informant Patient Reported? Taking?   HYDROcodone-acetaminophen (NORCO) 5-325 MG per tablet 2018 at Unknown time  Yes Yes   Sig: Take 1 tablet by mouth every 6 hours as needed for moderate to severe pain   LORazepam (ATIVAN) 0.5 MG tablet 2018 at 0400  No Yes   Sig: Take 1 tablet (0.5 mg) by mouth every 4 hours as needed (Anxiety, Nausea/Vomiting or Sleep)   acetaminophen (TYLENOL) 325 MG tablet Unknown at Unknown time  No No   Sig: Take 2 tablets (650 mg) by mouth every 4 hours as needed for mild pain   amylase-lipase-protease (CREON) [id number] UNITS CPEP per EC capsule 2018 at Unknown time  No Yes   Sig: Take 2-3 with meals / 1-2 with snacks, up to 15 per day.   dexamethasone (DECADRON) 4 MG tablet   No No   Sig: Take 2 tablets (8 mg) by mouth daily for 3 days Days 2 through 4.   loperamide (IMODIUM) 2 MG capsule Unknown at Unknown time  No No   Si  caps at 1st sign of diarrhea & 1 cap every 2hrs until 12hrs diarrhea free. During night, 2 caps at bedtime & 2 caps every 4hrs until AM   ondansetron (ZOFRAN) 8 MG tablet 1/19/2018 at Unknown time  No Yes   Sig: Take 1 tablet (8 mg) by mouth every 8 hours as needed for nausea   prochlorperazine (COMPAZINE) 10 MG tablet Past Week at Unknown time  No Yes   Sig: Take 1 tablet (10 mg) by mouth every 6 hours as needed (Nausea/Vomiting)      Facility-Administered Medications: None     Allergies   No Known Allergies    Social History   Social History     Social History     Marital status:      Spouse name: N/A     Number of children: N/A     Years of education: N/A     Occupational History     Not on file.     Social History Main Topics     Smoking status: Former Smoker     Packs/day: 0.50     Types: Cigarettes     Start date: 1/1/1974     Quit date: 1981     Smokeless tobacco: Never Used     Alcohol use No      Comment: Now quit since Oct 31     Drug use: No     Sexual activity: No     Other Topics Concern     Parent/Sibling W/ Cabg, Mi Or Angioplasty Before 65f 55m? Yes     Brother     Social History Narrative       Family History   I have reviewed this patient's family history and updated it with pertinent information if needed.   Family History   Problem Relation Age of Onset     HEART DISEASE Father      Hyperlipidemia Father      HEART DISEASE Brother      DIABETES Mother      Hypertension Mother      Obesity Mother      DIABETES Maternal Grandfather      Hypertension Maternal Grandfather      Obesity Maternal Grandfather      DIABETES Sister      Hypertension Sister      Depression Sister      Anxiety Disorder Sister      Obesity Sister      Hypertension Brother      Hyperlipidemia Brother      Breast Cancer Cousin      Breast Cancer Cousin      Breast Cancer Cousin      Colon Cancer Other      Other Cancer Other      Mesothelioma     Depression Sister      Anxiety Disorder Brother      Anxiety Disorder Son       MENTAL ILLNESS Sister      Schizophrenia     Obesity Maternal Grandmother      Obesity Sister        Review of Systems   A comprehensive ROS was performed with the patient and was found to be negative or non-contributory with the exception of that noted in the HPI above.    Physical Exam   Temp:  [98.1  F (36.7  C)] 98.1  F (36.7  C)  Pulse:  [70] 70  Resp:  [16] 16  BP: (106)/(68) 106/68  SpO2:  [97 %-99 %] 99 %  CONSTITUTIONAL: Alert and interactive. Lying in bed in no acute distress.  HEENT: NC/AT, PERRLA, EOMI, anicteric sclera, oropharynx is pink and moist.  NECK: Supple, full ROM.  CARDIOVASCULAR: RRR. Normal S1/S2. No murmurs. 2+ equal and bilateral radial and pedal pulses.   RESPIRATORY: CTAB. No wheezes appreciated. Normal respiratory effort on ambient air.  GASTROINTESTINAL: Soft, non-distended. Tender to palpation in the upper quadrants and epigastric region. Bowel sounds are hypoactive.  MUSCULOSKELETAL: No joint swelling or tenderness.  EXTREMITIES: No lower extremity edema.   SKIN: Warm and intact. No concerning lesions or rashes on exposed skin surfaces. No jaundice.  NEUROLOGIC: Alert and fully oriented, appropriately responsive during interview.   VASCULAR ACCESS: Port in right upper chest, C/D/I, non-tender.    Data   I have personally reviewed the following labs/imaging:  Results for orders placed or performed during the hospital encounter of 01/20/18 (from the past 24 hour(s))   UA with Microscopic   Result Value Ref Range    Color Urine Yellow     Appearance Urine Clear     Glucose Urine Negative NEG^Negative mg/dL    Bilirubin Urine Negative NEG^Negative    Ketones Urine Negative NEG^Negative mg/dL    Specific Gravity Urine 1.023 1.003 - 1.035    Blood Urine Negative NEG^Negative    pH Urine 6.5 5.0 - 7.0 pH    Protein Albumin Urine 10 (A) NEG^Negative mg/dL    Urobilinogen mg/dL Normal 0.0 - 2.0 mg/dL    Nitrite Urine Negative NEG^Negative    Leukocyte Esterase Urine Negative NEG^Negative     Source Midstream Urine     WBC Urine 1 0 - 2 /HPF    RBC Urine 1 0 - 2 /HPF    Mucous Urine Present (A) NEG^Negative /LPF   CBC with platelets differential   Result Value Ref Range    WBC 9.2 4.0 - 11.0 10e9/L    RBC Count 4.15 3.8 - 5.2 10e12/L    Hemoglobin 12.4 11.7 - 15.7 g/dL    Hematocrit 38.7 35.0 - 47.0 %    MCV 93 78 - 100 fl    MCH 29.9 26.5 - 33.0 pg    MCHC 32.0 31.5 - 36.5 g/dL    RDW 13.8 10.0 - 15.0 %    Platelet Count 257 150 - 450 10e9/L    Diff Method Automated Method     % Neutrophils 67.4 %    % Lymphocytes 30.4 %    % Monocytes 1.6 %    % Eosinophils 0.4 %    % Basophils 0.1 %    % Immature Granulocytes 0.1 %    Nucleated RBCs 0 0 /100    Absolute Neutrophil 6.2 1.6 - 8.3 10e9/L    Absolute Lymphocytes 2.8 0.8 - 5.3 10e9/L    Absolute Monocytes 0.2 0.0 - 1.3 10e9/L    Absolute Eosinophils 0.0 0.0 - 0.7 10e9/L    Absolute Basophils 0.0 0.0 - 0.2 10e9/L    Abs Immature Granulocytes 0.0 0 - 0.4 10e9/L    Absolute Nucleated RBC 0.0    INR   Result Value Ref Range    INR 1.08 0.86 - 1.14   Comprehensive metabolic panel   Result Value Ref Range    Sodium 139 133 - 144 mmol/L    Potassium 3.9 3.4 - 5.3 mmol/L    Chloride 104 94 - 109 mmol/L    Carbon Dioxide 28 20 - 32 mmol/L    Anion Gap 6 3 - 14 mmol/L    Glucose 98 70 - 99 mg/dL    Urea Nitrogen 22 7 - 30 mg/dL    Creatinine 0.64 0.52 - 1.04 mg/dL    GFR Estimate >90 >60 mL/min/1.7m2    GFR Estimate If Black >90 >60 mL/min/1.7m2    Calcium 8.6 8.5 - 10.1 mg/dL    Bilirubin Total 1.0 0.2 - 1.3 mg/dL    Albumin 3.2 (L) 3.4 - 5.0 g/dL    Protein Total 7.0 6.8 - 8.8 g/dL    Alkaline Phosphatase 73 40 - 150 U/L    ALT 42 0 - 50 U/L    AST 18 0 - 45 U/L   Lipase   Result Value Ref Range    Lipase 103 73 - 393 U/L   Lactic acid whole blood   Result Value Ref Range    Lactic Acid 1.0 0.7 - 2.0 mmol/L   Magnesium   Result Value Ref Range    Magnesium 2.5 (H) 1.6 - 2.3 mg/dL   CT Abdomen Pelvis w Contrast    Narrative    Examination:  CT ABDOMEN PELVIS W  CONTRAST 1/20/2018 2:14 PM     History: Abdominal pain. Additional history per outside medical record  includes history of pancreatitis and pancreatic cancer status post  first round of chemotherapy 5 days ago. Abdominal pain, nausea,  vomiting. Lower abdominal pain began 3 days ago, cramping. Difficulty  passing stool.     Comparison: CT chest, abdomen, and pelvis 1/10/2018 comment CT of the  abdomen and pelvis 12/27/2017, CT 11/20/2017    Technique: CT of the abdomen and pelvis were obtained following the  administration of contrast. Sagittal and coronal reconstructions  created and reviewed.    Findings:   Scattered hypoattenuating foci in the liver are not changed from  12/7/2017, too small to characterize. No new hepatic lesions are  identified. The spleen, adrenal glands, and gallbladder are  unremarkable. Symmetric nephrographic phase without hydronephrosis or  renal calculi. Scattered hypoattenuating lesions are too small to  characterize, likely simple cysts. The urinary bladder is  unremarkable.    At the junction of the body and tail of the pancreas there is a  heterogeneously dense lesion containing calcifications and measuring  3.5 x 2.9 cm, previously 3.4 x 3.0 cm. The remaining pancreas is  atrophic in appearance with pancreatic ductal dilatation in the body,  which is new from previous exam. There are new peripancreatic fluid  collections (series 4 image 47). The largest of these measures 2.6 x  3.0 cm on axial images. There is significant peripancreatic stranding,  increased from previous CT.    The small and large bowel are normal in caliber. Previously  demonstrated gastric/pyloric wall thickening persists. There is new  small to moderate volume of abdominal free fluid in the low pelvis. No  inguinal, mesenteric, or retroperitoneal lymphadenopathy. The major  abdominal vasculature is patent, however the superior mesenteric vein  narrows at the level of the peripancreatic fluid collections (series  2  image 59), increased from previous CT. Small ascites.    No acute osseous abnormalities. The lung bases are clear.      Impression    Impression:   1. Acute pancreatitis with increased peripancreatic inflammation and  new peripancreatic fluid collections. No definite pancreatic necrosis,  and these collections may represent peripancreatic acute necrotic  collections or pseudocysts.  2. A 3.5 cm heterogeneously dense lesion with calcifications at the  junction of the pancreatic body and tail is not significantly changed,  consistent with patient's known adenocarcinoma.    I have personally reviewed the examination and initial interpretation  and I agree with the findings.    SUSAN CAMARENA MD

## 2018-01-20 NOTE — ED PROVIDER NOTES
History     Chief Complaint   Patient presents with     Abdominal Pain     3 days     Nausea, Vomiting, & Diarrhea     HPI  Kitty Bangura is a 57 year old female with a history of pancreatitis and pancreatic cancer who underwent her first round of chemotherapy on Monday, 5 days ago, who presents to the Emergency Department today for evaluation of abdominal pain, nausea, and vomiting. The patient reports that she developed lower abdominal pain 3 days ago that is described as a cramping pain. She reports that it then started to spread up her abdomen and she was having difficulty passing stool. She was able to have a bowel movement yesterday that did help relieve her symptoms for a bit but her pain came back. She notes that it is a severe pain again today. She reports that she has tried to treat her symptoms with Maalox and laxatives but has not helped. She reports that she is not passing much gas. She says that her symptoms do not feel like typical constipation. She also reports that she developed nausea and vomiting last night, but denies vomiting today since she has not had much to eat or drink. She states that she last ate and drank fluids yesterday around 2 PM. She reports 5 past laparotomies. She still has her appendix and gallbladder.     I have reviewed the Medications, Allergies, Past Medical and Surgical History, and Social History in the [email or web address] system.    Past Medical History:   Diagnosis Date     Cancer (H)      Necrotizing pancreatitis      PONV (postoperative nausea and vomiting)        Past Surgical History:   Procedure Laterality Date     ABDOMEN SURGERY  1983    Ruptured tubal pregnancies, additional surgeries followed     BACK SURGERY  2004    L5, S1 discectomy     BREAST SURGERY  2003    Breast reduction     COLONOSCOPY  2008     ENDOSCOPIC ULTRASOUND, ESOPHAGOSCOPY, GASTROSCOPY, DUODENOSCOPY (EGD), NECROSECTOMY N/A 12/4/2017    Procedure: ENDOSCOPIC ULTRASOUND, ESOPHAGOSCOPY, GASTROSCOPY,  DUODENOSCOPY (EGD), NECROSECTOMY;  Esophagogastroduodenoscopy, with  Necrosectomy and stents replacement  ;  Surgeon: González Metz MD;  Location: [facility] OR     ENDOSCOPIC ULTRASOUND, ESOPHAGOSCOPY, GASTROSCOPY, DUODENOSCOPY (EGD), NECROSECTOMY N/A 2017    Procedure: ENDOSCOPIC ULTRASOUND, ESOPHAGOSCOPY, GASTROSCOPY, DUODENOSCOPY (EGD), NECROSECTOMY;  Esophagogastroduodenoscopy with Necrosectomy, Balloon Dilation, stent removal X3 and Cystgastrostomy stent Placement X2;  Surgeon: Guru Cecilia Staples MD;  Location: [facility] OR     ESOPHAGOSCOPY, GASTROSCOPY, DUODENOSCOPY (EGD), COMBINED N/A 2017    Procedure: COMBINED ENDOSCOPIC ULTRASOUND, ESOPHAGOSCOPY, GASTROSCOPY, DUODENOSCOPY (EGD), FINE NEEDLE ASPIRATE/BIOPSY;  Endoscopic Ultrasound with cystgastrostomy and fine needle aspiration ;  Surgeon: González Metz MD;  Location: [facility] OR     GYN SURGERY  1983    Multiple ectopic pregnancies, reconnect,       INSERT PORT VASCULAR ACCESS Right 2018    Procedure: INSERT PORT VASCULAR ACCESS;  Chest Port Placement - right;  Surgeon: Chanda Lawson PA-C;  Location: [facility]       Family History   Problem Relation Age of Onset     HEART DISEASE Father      Hyperlipidemia Father      HEART DISEASE Brother      DIABETES Mother      Hypertension Mother      Obesity Mother      DIABETES Maternal Grandfather      Hypertension Maternal Grandfather      Obesity Maternal Grandfather      DIABETES Sister      Hypertension Sister      Depression Sister      Anxiety Disorder Sister      Obesity Sister      Hypertension Brother      Hyperlipidemia Brother      Breast Cancer Cousin      Breast Cancer Cousin      Breast Cancer Cousin      Colon Cancer Other      Other Cancer Other      Mesothelioma     Depression Sister      Anxiety Disorder Brother      Anxiety Disorder Son      MENTAL ILLNESS Sister      Schizophrenia     Obesity Maternal Grandmother      Obesity Sister        Social  "History   Substance Use Topics     Smoking status: Former Smoker     Packs/day: 0.50     Types: Cigarettes     Start date: 1/1/1974     Quit date: 1981     Smokeless tobacco: Never Used     Alcohol use No      Comment: Now quit since Oct 31       No current facility-administered medications for this encounter.      Current Outpatient Prescriptions   Medication     ondansetron (ZOFRAN) 8 MG tablet     LORazepam (ATIVAN) 0.5 MG tablet     prochlorperazine (COMPAZINE) 10 MG tablet     HYDROcodone-acetaminophen (NORCO) 5-325 MG per tablet     amylase-lipase-protease (CREON) [id number] UNITS CPEP per EC capsule     dexamethasone (DECADRON) 4 MG tablet     loperamide (IMODIUM) 2 MG capsule     acetaminophen (TYLENOL) 325 MG tablet      No Known Allergies     Review of Systems   Constitutional: Positive for appetite change (decreased appetite). Negative for fever.   Gastrointestinal: Positive for abdominal pain, nausea and vomiting. Negative for constipation and diarrhea.   All other systems reviewed and are negative.      Physical Exam   BP: 106/68  Pulse: 70  Temp: 98.1  F (36.7  C)  Resp: 16  Height: 165.1 cm (5' 5\")  Weight: 73.9 kg (163 lb)  SpO2: 98 %      Physical Exam   Constitutional: She is oriented to person, place, and time. No distress.   HENT:   Head: Atraumatic.   Mouth/Throat: Oropharynx is clear and moist. No oropharyngeal exudate.   Eyes: Pupils are equal, round, and reactive to light. No scleral icterus.   Neck: Neck supple. No JVD present.   Cardiovascular: Normal rate, normal heart sounds and intact distal pulses.  Exam reveals no gallop and no friction rub.    No murmur heard.  Pulmonary/Chest: Effort normal and breath sounds normal. No respiratory distress. She has no wheezes. She has no rales. She exhibits no tenderness.   Abdominal: Soft. Bowel sounds are normal. She exhibits no distension and no mass. There is no hepatosplenomegaly. There is generalized tenderness. There is no rigidity, no " rebound, no guarding, no CVA tenderness, no tenderness at McBurney's point and negative Sol's sign. No hernia.   Musculoskeletal: She exhibits no edema or tenderness.   Neurological: She is alert and oriented to person, place, and time. No cranial nerve deficit. Coordination normal.   Skin: Skin is warm. No rash noted. She is not diaphoretic.       ED Course   11:24 AM  The patient was seen and examined by Dr. Espinal in Room 06.    ED Course     Procedures        Results for orders placed or performed during the hospital encounter of 01/20/18 (from the past 24 hour(s))   UA with Microscopic     Status: Abnormal    Collection Time: 01/20/18 12:10 PM   Result Value Ref Range    Color Urine Yellow     Appearance Urine Clear     Glucose Urine Negative NEG^Negative mg/dL    Bilirubin Urine Negative NEG^Negative    Ketones Urine Negative NEG^Negative mg/dL    Specific Gravity Urine 1.023 1.003 - 1.035    Blood Urine Negative NEG^Negative    pH Urine 6.5 5.0 - 7.0 pH    Protein Albumin Urine 10 (A) NEG^Negative mg/dL    Urobilinogen mg/dL Normal 0.0 - 2.0 mg/dL    Nitrite Urine Negative NEG^Negative    Leukocyte Esterase Urine Negative NEG^Negative    Source Midstream Urine     WBC Urine 1 0 - 2 /HPF    RBC Urine 1 0 - 2 /HPF    Mucous Urine Present (A) NEG^Negative /LPF   CBC with platelets differential     Status: None    Collection Time: 01/20/18 12:30 PM   Result Value Ref Range    WBC 9.2 4.0 - 11.0 10e9/L    RBC Count 4.15 3.8 - 5.2 10e12/L    Hemoglobin 12.4 11.7 - 15.7 g/dL    Hematocrit 38.7 35.0 - 47.0 %    MCV 93 78 - 100 fl    MCH 29.9 26.5 - 33.0 pg    MCHC 32.0 31.5 - 36.5 g/dL    RDW 13.8 10.0 - 15.0 %    Platelet Count 257 150 - 450 10e9/L    Diff Method Automated Method     % Neutrophils 67.4 %    % Lymphocytes 30.4 %    % Monocytes 1.6 %    % Eosinophils 0.4 %    % Basophils 0.1 %    % Immature Granulocytes 0.1 %    Nucleated RBCs 0 0 /100    Absolute Neutrophil 6.2 1.6 - 8.3 10e9/L    Absolute  Lymphocytes 2.8 0.8 - 5.3 10e9/L    Absolute Monocytes 0.2 0.0 - 1.3 10e9/L    Absolute Eosinophils 0.0 0.0 - 0.7 10e9/L    Absolute Basophils 0.0 0.0 - 0.2 10e9/L    Abs Immature Granulocytes 0.0 0 - 0.4 10e9/L    Absolute Nucleated RBC 0.0    INR     Status: None    Collection Time: 01/20/18 12:30 PM   Result Value Ref Range    INR 1.08 0.86 - 1.14   Comprehensive metabolic panel     Status: Abnormal    Collection Time: 01/20/18 12:30 PM   Result Value Ref Range    Sodium 139 133 - 144 mmol/L    Potassium 3.9 3.4 - 5.3 mmol/L    Chloride 104 94 - 109 mmol/L    Carbon Dioxide 28 20 - 32 mmol/L    Anion Gap 6 3 - 14 mmol/L    Glucose 98 70 - 99 mg/dL    Urea Nitrogen 22 7 - 30 mg/dL    Creatinine 0.64 0.52 - 1.04 mg/dL    GFR Estimate >90 >60 mL/min/1.7m2    GFR Estimate If Black >90 >60 mL/min/1.7m2    Calcium 8.6 8.5 - 10.1 mg/dL    Bilirubin Total 1.0 0.2 - 1.3 mg/dL    Albumin 3.2 (L) 3.4 - 5.0 g/dL    Protein Total 7.0 6.8 - 8.8 g/dL    Alkaline Phosphatase 73 40 - 150 U/L    ALT 42 0 - 50 U/L    AST 18 0 - 45 U/L   Lipase     Status: None    Collection Time: 01/20/18 12:30 PM   Result Value Ref Range    Lipase 103 73 - 393 U/L   Lactic acid whole blood     Status: None    Collection Time: 01/20/18 12:30 PM   Result Value Ref Range    Lactic Acid 1.0 0.7 - 2.0 mmol/L   Magnesium     Status: Abnormal    Collection Time: 01/20/18 12:30 PM   Result Value Ref Range    Magnesium 2.5 (H) 1.6 - 2.3 mg/dL   CT Abdomen Pelvis w Contrast     Status: None (Preliminary result)    Collection Time: 01/20/18  2:14 PM    Narrative    PRELIMINARY REPORT - The following report is a preliminary  interpretation.     1. Findings consistent with necrotizing pancreatitis with new  peripancreatic fluid collections, walled off necrosis.  2. A 3.5 cm heterogeneously dense lesion with calcifications at the  junction of the pancreatic body and tail is not significantly changed,  consistent with patient's known adenocarcinoma.  3.  Increased peripancreatic stranding with increased narrowing of the  superior mesenteric vein when compared to previous CT. Findings may  represent progression of pancreatitis versus spread of tumor.           Labs Ordered and Resulted from Time of ED Arrival Up to the Time of Departure from the ED   COMPREHENSIVE METABOLIC PANEL - Abnormal; Notable for the following:        Result Value    Albumin 3.2 (*)     All other components within normal limits   MAGNESIUM - Abnormal; Notable for the following:     Magnesium 2.5 (*)     All other components within normal limits   ROUTINE UA WITH MICROSCOPIC - Abnormal; Notable for the following:     Protein Albumin Urine 10 (*)     Mucous Urine Present (*)     All other components within normal limits   CBC WITH PLATELETS DIFFERENTIAL   INR   LIPASE   LACTIC ACID WHOLE BLOOD   CLOSTRIDIUM DIFFICILE TOXIN B            Assessments & Plan (with Medical Decision Making)  abd pain with worsening necrotizing pancreatitis on CT but labs all ok less than one week after first chemo for pancreatic ca, IVF and pain meds prn, D/W Hem Onc-admit.       I have reviewed the nursing notes.    I have reviewed the findings, diagnosis, plan and need for follow up with the patient.    New Prescriptions    No medications on file       Final diagnoses:   Necrotizing pancreatitis   Pancreatic adenocarcinoma (H)   Schuyler PEÑA, am serving as a trained medical scribe to document services personally performed by Dawn Espinal MD, based on the provider's statements to me.   Dawn PEÑA MD, was physically present and have reviewed and verified the accuracy of this note documented by Schuyler Garcia.     1/20/2018   [facility], EMERGENCY DEPARTMENT     Dawn Espinal MD  01/20/18 7142

## 2018-01-20 NOTE — IP AVS SNAPSHOT
"    [address] [facility]: [phone]                                              INTERAGENCY TRANSFER FORM - PHYSICIAN ORDERS   2018                    Hospital Admission Date: 2018  EZEQUIEL CHAVIS   : 1960  Sex: Female        Attending Provider: Suri Heart MD     Allergies:  No Known Allergies    Infection:  None   Service:  Hem/Onc    Ht:  1.651 m (5' 5\")   Wt:  76.2 kg (168 lb)   Admission Wt:  73.9 kg (163 lb)    BMI:  27.96 kg/m 2   BSA:  1.87 m 2            Patient PCP Information     Provider PCP Type    Solange Mcgill MD General      ED Clinical Impression     Diagnosis Description Comment Added By Time Added    Necrotizing pancreatitis [K85.91] Necrotizing pancreatitis [K85.91]  Dawn Espinal MD 2018  3:02 PM    Pancreatic adenocarcinoma (H) [C25.9] Pancreatic adenocarcinoma (H) [C25.9]  Dawn Espinal MD 2018  3:02 PM      Hospital Problems as of 2018              Priority Class Noted POA    Necrotizing pancreatitis Medium  2017 Yes      Non-Hospital Problems as of 2018              Priority Class Noted    Acute pancreatitis Medium  2017    Leukocytosis Medium  2017    Fatty liver Medium  2017    Pancreatic mass Medium  2017    Pancreatitis Medium  2017    Pancreatic adenocarcinoma (H) Medium  2018      Code Status History     Date Active Date Inactive Code Status Order ID Comments User Context    2018 10:12 AM  Full Code [id number]  Venus Potts PA-C Outpatient    2018  5:34 PM 2018 10:12 AM Full Code [id number]  Antonieta Zarate MD Inpatient    2017  9:58 AM 2017  6:55 PM Full Code [id number]  Whitney Cowan MD Inpatient    2017  9:35 PM 2017  5:35 PM Full Code [id number]  Soledad Arriola PA-C Inpatient         Medication Review      START taking        Dose / Directions Comments    oxyCODONE IR 5 MG tablet   Commonly known as:  ROXICODONE   Used for:  " Necrotizing pancreatitis, Pancreatic adenocarcinoma (H)        Dose:  5 mg   Take 1 tablet (5 mg) by mouth every 4 hours as needed for moderate to severe pain   Quantity:  30 tablet   Refills:  0          CONTINUE these medications which have NOT CHANGED        Dose / Directions Comments    amylase-lipase-protease [id number] UNITS Cpep per EC capsule   Commonly known as:  CREON   Used for:  Necrotizing pancreatitis        Take 2-3 with meals / 1-2 with snacks, up to 15 per day.   Quantity:  450 capsule   Refills:  6        LORazepam 0.5 MG tablet   Commonly known as:  ATIVAN   Used for:  Pancreatic adenocarcinoma (H)        Dose:  0.5 mg   Take 1 tablet (0.5 mg) by mouth every 4 hours as needed (Anxiety, Nausea/Vomiting or Sleep)   Quantity:  30 tablet   Refills:  2        ondansetron 8 MG tablet   Commonly known as:  ZOFRAN   Used for:  Pancreatic adenocarcinoma (H)        Dose:  8 mg   Take 1 tablet (8 mg) by mouth every 8 hours as needed for nausea   Quantity:  30 tablet   Refills:  0        prochlorperazine 10 MG tablet   Commonly known as:  COMPAZINE   Used for:  Pancreatic adenocarcinoma (H)        Dose:  10 mg   Take 1 tablet (10 mg) by mouth every 6 hours as needed (Nausea/Vomiting)   Quantity:  30 tablet   Refills:  2          STOP taking     acetaminophen 325 MG tablet   Commonly known as:  TYLENOL           HYDROcodone-acetaminophen 5-325 MG per tablet   Commonly known as:  NORCO           loperamide 2 MG capsule   Commonly known as:  IMODIUM                   Summary of Visit     Reason for your hospital stay       You were admitted for pancreatitis. You had an EUS with cyst gastrostomy performed by Dr. Metz. Your pain is now well controlled and you are tolerating a regular diet. You are safe to discharge home.             After Care     Activity       Your activity upon discharge: Regular activity as tolerated.       Diet       Follow this diet upon discharge: Regular diet as tolerated. Please eat  foods that are soft in texture to allow the new stent in your stomach to heal.             Lab Orders     **Comprehensive metabolic panel FUTURE anytime           CBC with platelets differential       Last Lab Result: Hemoglobin (g/dL)       Date                     Value                 01/27/2018               9.4 (L)          ----------             Your next 10 appointments already scheduled     Jan 31, 2018  2:15 PM CST   [facility] Draw with  [facility]RAW   [facility]    [address] [place] [address]  [address]  [facility] [address]   [phone]            Jan 31, 2018  2:40 PM CST   (Arrive by 2:25 PM)   Return Visit with FORREST [facility] ([facility])    [address] [place] [address]  [address] [place] [address]   [phone]            Feb 12, 2018  9:15 AM CST   [facility] Lab Draw with  [facility] LAB DRAW   [facility] Lab Draw ([facility])    [address] [place] [address]  [address]  [facility] [address]   [phone]            Feb 12, 2018  9:40 AM CST   (Arrive by 9:25 AM)   Return Visit with FORREST [facility] ([facility])    [address] [place] [address]  [address] [place] [address]   [phone]            Feb 12, 2018 10:30 AM CST   Infusion 360 with [facility]OGY INFUSION, [facility] 21 ATC   [facility] ([facility])    [address] [place] [address]  [address]  [facility] [address]   [phone]            Feb 26, 2018  8:00 AM CST   [facility] Lab Draw with  [facility] LAB DRAW   [facility] Lab Draw ([facility])    [address] [place] [address]  [address] [place] [address]   [phone]            Feb 26, 2018  8:30 AM CST   (Arrive by 8:15 AM)   Return Visit with FORREST [facility] (  [facility])    [address]  [address]  [place] [address]   [phone]            Feb 26, 2018  9:00 AM CST   Infusion 360 with [facility] ONCOLOGY INFUSION, [facility] 23 ATC   [facility] ([facility])    [address]  [address]  [place] [address]   [phone]              Follow-Up Appointment Instructions     Future Labs/Procedures    Adult [facility] Follow-up and recommended labs and tests     Comments:    You have a hospital follow-up appointment with Kellie Nobles on 1/31/18. Your infusion appointment will be rescheduled for 2/5/18 (will likely be rescheduled on Monday).     Appointments on [place] and/or [facility] (with [facility] or [facility] provider or service). Call [phone] if you haven't heard regarding these appointments within 7 days of discharge.    Please see follow-up appointments below:      Follow-Up Appointment Instructions     Adult [facility] Follow-up and recommended labs and tests       You have a hospital follow-up appointment with Kellie Nobles on 1/31/18. Your infusion appointment will be rescheduled for 2/5/18 (will likely be rescheduled on Monday).     Appointments on [place] and/or [facility] (with [facility] or [facility] provider or service). Call [phone] if you haven't heard regarding these appointments within 7 days of discharge.    Please see follow-up appointments below:             Statement of Approval     Ordered          01/27/18 1210  I have reviewed and agree with all the recommendations and orders detailed in this document.  EFFECTIVE NOW     Approved and electronically signed by:  Venus Potts PA-C

## 2018-01-20 NOTE — ED NOTES
[facility], [place]   ED Nurse to Floor Handoff     Kitty Bangura is a 57 year old female who speaks English and lives with a spouse,  in a home  They arrived in the ED by car from home    ED Chief Complaint: Abdominal Pain (3 days) and Nausea, Vomiting, & Diarrhea    ED Dx;   Final diagnoses:   Necrotizing pancreatitis   Pancreatic adenocarcinoma (H)         Needed?: No    Allergies: No Known Allergies.  Past Medical Hx:   Past Medical History:   Diagnosis Date     Cancer (H)      Necrotizing pancreatitis      PONV (postoperative nausea and vomiting)       Baseline Mental status: WDL  Current Mental Status changes: at basesline    Infection: No  Sepsis suspected: No  Isolation type: Enteric     Activity level - Baseline/Home:  Independent  Activity Level - Current:   Stand with Assist    Bariatric equipment needed?: No    In the ED these meds were given:   Medications   lactated ringers infusion ( Intravenous New Bag 1/20/18 1541)   lactated ringers BOLUS 1,000 mL (0 mLs Intravenous Stopped 1/20/18 1540)   metoclopramide (REGLAN) injection 5 mg (5 mg Intravenous Given 1/20/18 1226)   HYDROmorphone (PF) (DILAUDID) injection 0.5 mg (0.5 mg Intravenous Given 1/20/18 1255)   iopamidol (ISOVUE-370) solution 100 mL (100 mLs Intravenous Given 1/20/18 1405)   sodium chloride 0.9 % bag 500mL for CT scan flush use (75 mLs Intravenous Given 1/20/18 1405)   HYDROmorphone (PF) (DILAUDID) injection 0.5 mg (0.5 mg Intravenous Given 1/20/18 1540)       Drips running? Yes , maintenance     Home pump or pre-existing LDA's present? Yes    Labs results:   Labs Ordered and Resulted from Time of ED Arrival Up to the Time of Departure from the ED   COMPREHENSIVE METABOLIC PANEL - Abnormal; Notable for the following:        Result Value    Albumin 3.2 (*)     All other components within normal limits   MAGNESIUM - Abnormal; Notable for the following:     Magnesium 2.5 (*)     All other components  within normal limits   ROUTINE UA WITH MICROSCOPIC - Abnormal; Notable for the following:     Protein Albumin Urine 10 (*)     Mucous Urine Present (*)     All other components within normal limits   CBC WITH PLATELETS DIFFERENTIAL   INR   LIPASE   LACTIC ACID WHOLE BLOOD   CLOSTRIDIUM DIFFICILE TOXIN B       Imaging Studies:   Recent Results (from the past 24 hour(s))   CT Abdomen Pelvis w Contrast    Narrative    Examination:  CT ABDOMEN PELVIS W CONTRAST 1/20/2018 2:14 PM     History: Abdominal pain. Additional history per outside medical record  includes history of pancreatitis and pancreatic cancer status post  first round of chemotherapy 5 days ago. Abdominal pain, nausea,  vomiting. Lower abdominal pain began 3 days ago, cramping. Difficulty  passing stool.     Comparison: CT chest, abdomen, and pelvis 1/10/2018 comment CT of the  abdomen and pelvis 12/27/2017, CT 11/20/2017    Technique: CT of the abdomen and pelvis were obtained following the  administration of contrast. Sagittal and coronal reconstructions  created and reviewed.    Findings:   Scattered hypoattenuating foci in the liver are not changed from  12/7/2017, too small to characterize. No new hepatic lesions are  identified. The spleen, adrenal glands, and gallbladder are  unremarkable. Symmetric nephrographic phase without hydronephrosis or  renal calculi. Scattered hypoattenuating lesions are too small to  characterize, likely simple cysts. The urinary bladder is  unremarkable.    At the junction of the body and tail of the pancreas there is a  heterogeneously dense lesion containing calcifications and measuring  3.5 x 2.9 cm, previously 3.4 x 3.0 cm. The remaining pancreas is  atrophic in appearance with pancreatic ductal dilatation in the body,  which is new from previous exam. There are new peripancreatic fluid  collections (series 4 image 47). The largest of these measures 2.6 x  3.0 cm on axial images. There is significant peripancreatic  "stranding,  increased from previous CT.    The small and large bowel are normal in caliber. Previously  demonstrated gastric/pyloric wall thickening persists. There is new  small to moderate volume of abdominal free fluid in the low pelvis. No  inguinal, mesenteric, or retroperitoneal lymphadenopathy. The major  abdominal vasculature is patent, however the superior mesenteric vein  narrows at the level of the peripancreatic fluid collections (series 2  image 59), increased from previous CT. Small ascites.    No acute osseous abnormalities. The lung bases are clear.      Impression    Impression:   1. Acute pancreatitis with increased peripancreatic inflammation and  new peripancreatic fluid collections. No definite pancreatic necrosis,  and these collections may represent peripancreatic acute necrotic  collections or pseudocysts.  2. A 3.5 cm heterogeneously dense lesion with calcifications at the  junction of the pancreatic body and tail is not significantly changed,  consistent with patient's known adenocarcinoma.    I have personally reviewed the examination and initial interpretation  and I agree with the findings.    SUSAN CAMARENA MD       Recent vital signs:   /68  Pulse 70  Temp 98.1  F (36.7  C) (Oral)  Resp 16  Ht 1.651 m (5' 5\")  Wt 73.9 kg (163 lb)  SpO2 97%  Breastfeeding? No  BMI 27.12 kg/m2    Cardiac Rhythm: Normal Sinus  Pt needs tele? No  Skin/wound Issues: None    Code Status: full      Pain control: fair    Nausea control: fair    Abnormal labs/tests/findings requiring intervention: see lab report     Family present during ED course? No   Family Comments/Social Situation comments: loving family     Tasks needing completion: None    Fabian Arreguin RN  [facility]-- [phone] [place] [facility]  [phone]    "

## 2018-01-20 NOTE — IP AVS SNAPSHOT
MRN:[id number]                      After Visit Summary   1/20/2018    Kitty Bangura    MRN: [id number]           Thank you!     Thank you for choosing [facility] for your care. Our goal is always to provide you with excellent care. Hearing back from our patients is one way we can continue to improve our services. Please take a few minutes to complete the written survey that you may receive in the mail after you visit with us. Thank you!        Patient Information     Date Of Birth          1960        Designated Caregiver       Most Recent Value    Caregiver    Will someone help with your care after discharge? yes    Name of designated caregiver  Andrew    Phone number of caregiver [phone]    Caregiver address same      About your hospital stay     You were admitted on:  January 20, 2018 You last received care in the:  [address] [facility]    You were discharged on:  January 27, 2018        Reason for your hospital stay       You were admitted for pancreatitis. You had an EUS with cyst gastrostomy performed by Dr. Metz. Your pain is now well controlled and you are tolerating a regular diet. You are safe to discharge home.                  Who to Call     For medical emergencies, please call 911.  For non-urgent questions about your medical care, please call your primary care provider or clinic, [phone]  For questions related to your surgery, please call your surgery clinic        Attending Provider     Provider Specialty    Dawn Espinal MD Internal Medicine    Suri Heart MD Hematology & Oncology       Primary Care Provider Office Phone # Fax #    Josenargis Julito Mcgill [phone]       When to contact your care team       Call the [facility] 24-hour triage line at [phone] or [phone] for temp >100.4, uncontrolled nausea/vomiting/diarrhea/constipation, unrelieved pain, bleeding not relieved with pressure, dizziness, chest pain,  shortness of breath, loss of consciousness, and any new or concerning symptoms.     Call [phone] and ask for the care coordinator that works with your oncologist if you have questions about scans, appointments, hospital follow-up, or other concerns.                  After Care Instructions     Activity       Your activity upon discharge: Regular activity as tolerated.            Diet       Follow this diet upon discharge: Regular diet as tolerated. Please eat foods that are soft in texture to allow the new stent in your stomach to heal.                  Follow-up Appointments     [facility] Follow-up and recommended labs and tests       You have a hospital follow-up appointment with Kellie Nobles on 1/31/18. Your infusion appointment will be rescheduled for 2/5/18 (will likely be rescheduled on Monday).     Appointments on [place] and/or [facility] (with [facility] or [facility] provider or service). Call [phone] if you haven't heard regarding these appointments within 7 days of discharge.    Please see follow-up appointments below:                  Your next 10 appointments already scheduled     Jan 31, 2018  2:15 PM CST   [facility] Draw with  [facility] DRAW   [facility]    [address] [place] [address]  [address]  [facility] [address]   [phone]            Jan 31, 2018  2:40 PM CST   (Arrive by 2:25 PM)   Return Visit with [facility] ([facility])    [address] [place] [address]  [address]  [facility] [address]   [phone]            Feb 12, 2018  9:15 AM CST   [facility] Lab Draw with  [facility] DRAW   [facility]    [address] [place] [address]  [address]  [facility] [address]   [phone]            Feb 12, 2018  9:40 AM CST   (Arrive by 9:25 AM)   Return Visit with [facility] ([facility])    [address]  [address]  [facility] [address]   [phone]            Feb 12, 2018 10:30 AM CST   Infusion 360 with UC ONCOLOGY INFUSION, UC 21 ATC   [facility] ([facility])    [address]  [address]  [facility] [address]   [phone]            Feb 26, 2018  8:00 AM CST   [facility] Lab Draw with [facility] LAB DRAW   [facility] Draw ([facility])    [address]  [address]  [facility] [address]   [phone]            Feb 26, 2018  8:30 AM CST   (Arrive by 8:15 AM)   Return Visit with Renate Mcnair PA-C   [facility] ([facility])    [address] [place] [address]  [address]  [facility] [address]   [phone]            Feb 26, 2018  9:00 AM CST   Infusion 360 with UC ONCOLOGY INFUSION, UC 23 ATC   [facility] ([facility])    [address] [place] [address]  [address]  [facility] [address]   [phone]              Future tests that were ordered for you     **Comprehensive metabolic panel FUTURE anytime           CBC with platelets differential       Last Lab Result: Hemoglobin (g/dL)       Date                     Value                 01/27/2018               9.4 (L)          ----------                  Additional Information     If you use hormonal birth control (such as the pill, patch, ring or implants): You'll need a second form of birth control for 7 days (condoms, a diaphragm or contraceptive foam). While in the hospital, you received a medicine called Bridion. Your normal birth control will not work as well for a week after taking this medicine.          Pending Results     No orders found from 1/18/2018 to 1/21/2018.            Statement of Approval     Ordered          01/27/18 1210  I have reviewed and agree with all the recommendations and orders  "detailed in this document.  EFFECTIVE NOW     Approved and electronically signed by:  Venus Potts PA-C             Admission Information     Date & Time Provider Department Dept. Phone    1/20/2018 Suri Heart MD [address] [facility] [phone]      Your Vitals Were     Blood Pressure Pulse Temperature Respirations Height Weight    118/71 (BP Location: Left arm) 60 97.7  F (36.5  C) (Oral) 18 1.651 m (5' 5\") 76.2 kg (168 lb)    Pulse Oximetry BMI (Body Mass Index)                96% 27.96 kg/m2          MyChart Information     [facility] gives you secure access to your electronic health record. If you see a primary care provider, you can also send messages to your care team and make appointments. If you have questions, please call your primary care clinic.  If you do not have a primary care provider, please call [phone] and they will assist you.        Care EveryWhere ID     This is your Care EveryWhere ID. This could be used by other organizations to access your [facility] medical records  [id number]        Equal Access to Services     [facility] Hospital[facility] : Hadii zaheer Ruiz, waaxda luqadaha, qaybta kaalmafredy ferguson, re gerardo . So [facility] [phone].    ATENCIÓN: Si habla español, tiene a quiros disposición servicios gratuitos de asistencia lingüística. Llame al [phone].    We comply with applicable federal civil rights laws and [place] laws. We do not discriminate on the basis of race, color, national origin, age, disability, sex, sexual orientation, or gender identity.               Review of your medicines      START taking        Dose / Directions    oxyCODONE IR 5 MG tablet   Commonly known as:  ROXICODONE   Used for:  Necrotizing pancreatitis, Pancreatic adenocarcinoma (H)        Dose:  5 mg   Take 1 tablet (5 mg) by mouth every 4 hours as needed for moderate to severe pain   Quantity:  30 tablet   Refills:  0         CONTINUE these medicines which have " NOT CHANGED        Dose / Directions    amylase-lipase-protease 57906-70456 UNITS Cpep per EC capsule   Commonly known as:  CREON   Used for:  Necrotizing pancreatitis        Take 2-3 with meals / 1-2 with snacks, up to 15 per day.   Quantity:  450 capsule   Refills:  6       LORazepam 0.5 MG tablet   Commonly known as:  ATIVAN   Used for:  Pancreatic adenocarcinoma (H)        Dose:  0.5 mg   Take 1 tablet (0.5 mg) by mouth every 4 hours as needed (Anxiety, Nausea/Vomiting or Sleep)   Quantity:  30 tablet   Refills:  2       ondansetron 8 MG tablet   Commonly known as:  ZOFRAN   Used for:  Pancreatic adenocarcinoma (H)        Dose:  8 mg   Take 1 tablet (8 mg) by mouth every 8 hours as needed for nausea   Quantity:  30 tablet   Refills:  0       prochlorperazine 10 MG tablet   Commonly known as:  COMPAZINE   Used for:  Pancreatic adenocarcinoma (H)        Dose:  10 mg   Take 1 tablet (10 mg) by mouth every 6 hours as needed (Nausea/Vomiting)   Quantity:  30 tablet   Refills:  2         STOP taking     acetaminophen 325 MG tablet   Commonly known as:  TYLENOL           HYDROcodone-acetaminophen 5-325 MG per tablet   Commonly known as:  NORCO           loperamide 2 MG capsule   Commonly known as:  IMODIUM                Where to get your medicines      Some of these will need a paper prescription and others can be bought over the counter. Ask your nurse if you have questions.     Bring a paper prescription for each of these medications     oxyCODONE IR 5 MG tablet                Protect others around you: Learn how to safely use, store and throw away your medicines at www.disposemymeds.org.        Information about OPIOIDS     PRESCRIPTION OPIOIDS: WHAT YOU NEED TO KNOW    Prescription opioids can be used to help relieve moderate to severe pain and are often prescribed following a surgery or injury, or for certain health conditions. These medications can be an important part of treatment but also come with serious  risks. It is important to work with your health care provider to make sure you are getting the safest, most effective care.    WHAT ARE THE RISKS AND SIDE EFFECTS OF OPIOID USE?  Prescription opioids carry serious risks of addiction and overdose, especially with prolonged use. An opioid overdose, often marked by slowed breathing can cause sudden death. The use of prescription opioids can have a number of side effects as well, even when taken as directed:      Tolerance - meaning you might need to take more of a medication for the same pain relief    Physical dependence - meaning you have symptoms of withdrawal when a medication is stopped    Increased sensitivity to pain    Constipation    Nausea, vomiting, and dry mouth    Sleepiness and dizziness    Confusion    Depression    Low levels of testosterone that can result in lower sex drive, energy, and strength    Itching and sweating    RISKS ARE GREATER WITH:    History of drug misuse, substance use disorder, or overdose    Mental health conditions (such as depression or anxiety)    Sleep apnea    Older age (65 years or older)    Pregnancy    Avoid alcohol while taking prescription opioids.   Also, unless specifically advised by your health care provider, medications to avoid include:    Benzodiazepines (such as Xanax or Valium)    Muscle relaxants (such as Soma or Flexeril)    Hypnotics (such as Ambien or Lunesta)    Other prescription opioids    KNOW YOUR OPTIONS:  Talk to your health care provider about ways to manage your pain that do not involve prescription opioids. Some of these options may actually work better and have fewer risks and side effects:    Pain relievers such as acetaminophen, ibuprofen, and naproxen    Some medications that are also used for depression or seizures    Physical therapy and exercise    Cognitive behavioral therapy, a psychological, goal-directed approach, in which patients learn how to modify physical, behavioral, and emotional  triggers of pain and stress    IF YOU ARE PRESCRIBED OPIOIDS FOR PAIN:    Never take opioids in greater amounts or more often than prescribed    Follow up with your primary health care provider and work together to create a plan on how to manage your pain.    Talk about ways to help manage your pain that do not involve prescription opioids    Talk about all concerns and side effects    Help prevent misuse and abuse    Never sell or share prescription opioids    Never use another person's prescription opioids    Store prescription opioids in a secure place and out of reach of others (this may include visitors, children, friends, and family)    Visit www.cdc.gov/drugoverdose to learn about risks of opioid abuse and overdose    If you believe you may be struggling with addiction, tell your health care provider and ask for guidance or call [facility]'s National Helpline at 8-504-634-HELP    LEARN MORE / www.cdc.gov/drugoverdose/prescribing/guideline.html    Safely dispose of unused prescription opioids: Find your local drug take-back programs and more information about the importance of safe disposal at www.doseofreality.mn.gov             Medication List: This is a list of all your medications and when to take them. Check marks below indicate your daily home schedule. Keep this list as a reference.      Medications           Morning Afternoon Evening Bedtime As Needed    amylase-lipase-protease 24635-06819 UNITS Cpep per EC capsule   Commonly known as:  CREON   Take 2-3 with meals / 1-2 with snacks, up to 15 per day.   Last time this was given:  24,000 Units on 1/27/2018 12:47 PM                                LORazepam 0.5 MG tablet   Commonly known as:  ATIVAN   Take 1 tablet (0.5 mg) by mouth every 4 hours as needed (Anxiety, Nausea/Vomiting or Sleep)                                ondansetron 8 MG tablet   Commonly known as:  ZOFRAN   Take 1 tablet (8 mg) by mouth every 8 hours as needed for nausea                                 oxyCODONE IR 5 MG tablet   Commonly known as:  ROXICODONE   Take 1 tablet (5 mg) by mouth every 4 hours as needed for moderate to severe pain                                prochlorperazine 10 MG tablet   Commonly known as:  COMPAZINE   Take 1 tablet (10 mg) by mouth every 6 hours as needed (Nausea/Vomiting)

## 2018-01-20 NOTE — ED NOTES
"Triage Assessment & Note:    /68  Pulse 70  Temp 98.1  F (36.7  C) (Oral)  Resp 16  Ht 1.651 m (5' 5\")  Wt 73.9 kg (163 lb)  SpO2 98%  Breastfeeding? No  BMI 27.12 kg/m2      Patient presents with: Oncology pt comes to triage with family with abdominal pain/ cramping and n/v/d.  Pt had first chemo 1/15/18. No reports of fever, cough, or SOB.     Home Treatments/Remedies: home medication    Febrile / Afebrile: afebrile    Duration of C/o: 3 days    Yazmin Harkins RN  January 20, 2018        "

## 2018-01-20 NOTE — TELEPHONE ENCOUNTER
Abdominal cramping up to 10/10 , started on 1/18/18 due to chemo and was given a Rx that helped before for this pain  And requesting a page to on-call provider for kassy diana .   [facility]    Emilia  To page Dr brina Corona  @[phone]  To   Pt @ [phone]  To get 2nd level triage .   .Jany Chaney RN [facility] nurse advisors.    Reason for Disposition    Shock suspected (e.g., cold/pale/clammy skin, too weak to stand, low BP, rapid pulse)    Protocols used: ABDOMINAL PAIN - FEMALE-ADULT-

## 2018-01-21 LAB
ALBUMIN SERPL-MCNC: 2.6 G/DL (ref 3.4–5)
ALP SERPL-CCNC: 60 U/L (ref 40–150)
ALT SERPL W P-5'-P-CCNC: 32 U/L (ref 0–50)
ANION GAP SERPL CALCULATED.3IONS-SCNC: 6 MMOL/L (ref 3–14)
AST SERPL W P-5'-P-CCNC: 14 U/L (ref 0–45)
BASOPHILS # BLD AUTO: 0 10E9/L (ref 0–0.2)
BASOPHILS NFR BLD AUTO: 0.2 %
BILIRUB SERPL-MCNC: 0.9 MG/DL (ref 0.2–1.3)
BUN SERPL-MCNC: 15 MG/DL (ref 7–30)
CALCIUM SERPL-MCNC: 8.1 MG/DL (ref 8.5–10.1)
CHLORIDE SERPL-SCNC: 105 MMOL/L (ref 94–109)
CO2 SERPL-SCNC: 26 MMOL/L (ref 20–32)
CREAT SERPL-MCNC: 0.68 MG/DL (ref 0.52–1.04)
DIFFERENTIAL METHOD BLD: ABNORMAL
EOSINOPHIL # BLD AUTO: 0.1 10E9/L (ref 0–0.7)
EOSINOPHIL NFR BLD AUTO: 1.6 %
ERYTHROCYTE [DISTWIDTH] IN BLOOD BY AUTOMATED COUNT: 13.7 % (ref 10–15)
GFR SERPL CREATININE-BSD FRML MDRD: 89 ML/MIN/1.7M2
GLUCOSE SERPL-MCNC: 82 MG/DL (ref 70–99)
HCT VFR BLD AUTO: 33.2 % (ref 35–47)
HGB BLD-MCNC: 10.5 G/DL (ref 11.7–15.7)
IMM GRANULOCYTES # BLD: 0 10E9/L (ref 0–0.4)
IMM GRANULOCYTES NFR BLD: 0.3 %
LYMPHOCYTES # BLD AUTO: 2.7 10E9/L (ref 0.8–5.3)
LYMPHOCYTES NFR BLD AUTO: 41.9 %
MAGNESIUM SERPL-MCNC: 2.2 MG/DL (ref 1.6–2.3)
MCH RBC QN AUTO: 29.6 PG (ref 26.5–33)
MCHC RBC AUTO-ENTMCNC: 31.6 G/DL (ref 31.5–36.5)
MCV RBC AUTO: 94 FL (ref 78–100)
MONOCYTES # BLD AUTO: 0.2 10E9/L (ref 0–1.3)
MONOCYTES NFR BLD AUTO: 3.1 %
NEUTROPHILS # BLD AUTO: 3.4 10E9/L (ref 1.6–8.3)
NEUTROPHILS NFR BLD AUTO: 52.9 %
NRBC # BLD AUTO: 0 10*3/UL
NRBC BLD AUTO-RTO: 0 /100
PLATELET # BLD AUTO: 192 10E9/L (ref 150–450)
POTASSIUM SERPL-SCNC: 3.9 MMOL/L (ref 3.4–5.3)
PROT SERPL-MCNC: 5.7 G/DL (ref 6.8–8.8)
RBC # BLD AUTO: 3.55 10E12/L (ref 3.8–5.2)
SODIUM SERPL-SCNC: 137 MMOL/L (ref 133–144)
WBC # BLD AUTO: 6.4 10E9/L (ref 4–11)

## 2018-01-21 PROCEDURE — 36592 COLLECT BLOOD FROM PICC: CPT | Performed by: INTERNAL MEDICINE

## 2018-01-21 PROCEDURE — 83735 ASSAY OF MAGNESIUM: CPT | Performed by: INTERNAL MEDICINE

## 2018-01-21 PROCEDURE — 25000128 H RX IP 250 OP 636: Performed by: INTERNAL MEDICINE

## 2018-01-21 PROCEDURE — 25800025 ZZH RX 258: Performed by: PHYSICIAN ASSISTANT

## 2018-01-21 PROCEDURE — 99233 SBSQ HOSP IP/OBS HIGH 50: CPT | Performed by: INTERNAL MEDICINE

## 2018-01-21 PROCEDURE — 80053 COMPREHEN METABOLIC PANEL: CPT | Performed by: INTERNAL MEDICINE

## 2018-01-21 PROCEDURE — 25000128 H RX IP 250 OP 636: Performed by: PHYSICIAN ASSISTANT

## 2018-01-21 PROCEDURE — 12000001 ZZH R&B MED SURG/OB UMMC

## 2018-01-21 PROCEDURE — 85025 COMPLETE CBC W/AUTO DIFF WBC: CPT | Performed by: INTERNAL MEDICINE

## 2018-01-21 RX ORDER — HYDROMORPHONE HYDROCHLORIDE 1 MG/ML
.5-1 INJECTION, SOLUTION INTRAMUSCULAR; INTRAVENOUS; SUBCUTANEOUS
Status: DISCONTINUED | OUTPATIENT
Start: 2018-01-21 | End: 2018-01-21

## 2018-01-21 RX ORDER — DEXTROSE MONOHYDRATE, SODIUM CHLORIDE, AND POTASSIUM CHLORIDE 50; 1.49; 9 G/1000ML; G/1000ML; G/1000ML
INJECTION, SOLUTION INTRAVENOUS CONTINUOUS
Status: DISCONTINUED | OUTPATIENT
Start: 2018-01-21 | End: 2018-01-27 | Stop reason: HOSPADM

## 2018-01-21 RX ADMIN — POTASSIUM CHLORIDE, DEXTROSE MONOHYDRATE AND SODIUM CHLORIDE: 150; 5; 900 INJECTION, SOLUTION INTRAVENOUS at 13:02

## 2018-01-21 RX ADMIN — HYDROMORPHONE HYDROCHLORIDE 1 MG: 1 INJECTION, SOLUTION INTRAMUSCULAR; INTRAVENOUS; SUBCUTANEOUS at 09:49

## 2018-01-21 RX ADMIN — Medication: at 16:36

## 2018-01-21 RX ADMIN — HYDROMORPHONE HYDROCHLORIDE 0.5 MG: 1 INJECTION, SOLUTION INTRAMUSCULAR; INTRAVENOUS; SUBCUTANEOUS at 05:04

## 2018-01-21 RX ADMIN — HYDROMORPHONE HYDROCHLORIDE: 10 INJECTION, SOLUTION INTRAMUSCULAR; INTRAVENOUS; SUBCUTANEOUS at 13:04

## 2018-01-21 RX ADMIN — HYDROMORPHONE HYDROCHLORIDE 0.5 MG: 1 INJECTION, SOLUTION INTRAMUSCULAR; INTRAVENOUS; SUBCUTANEOUS at 02:33

## 2018-01-21 RX ADMIN — HYDROMORPHONE HYDROCHLORIDE 0.5 MG: 1 INJECTION, SOLUTION INTRAMUSCULAR; INTRAVENOUS; SUBCUTANEOUS at 07:43

## 2018-01-21 RX ADMIN — ONDANSETRON 8 MG: 2 INJECTION INTRAMUSCULAR; INTRAVENOUS at 02:33

## 2018-01-21 RX ADMIN — Medication: at 22:33

## 2018-01-21 RX ADMIN — HYDROMORPHONE HYDROCHLORIDE 1 MG: 1 INJECTION, SOLUTION INTRAMUSCULAR; INTRAVENOUS; SUBCUTANEOUS at 11:44

## 2018-01-21 RX ADMIN — SODIUM CHLORIDE, POTASSIUM CHLORIDE, SODIUM LACTATE AND CALCIUM CHLORIDE: 600; 310; 30; 20 INJECTION, SOLUTION INTRAVENOUS at 11:40

## 2018-01-21 RX ADMIN — POTASSIUM CHLORIDE, DEXTROSE MONOHYDRATE AND SODIUM CHLORIDE: 150; 5; 900 INJECTION, SOLUTION INTRAVENOUS at 22:33

## 2018-01-21 RX ADMIN — PROCHLORPERAZINE EDISYLATE 5 MG: 5 INJECTION INTRAMUSCULAR; INTRAVENOUS at 05:04

## 2018-01-21 RX ADMIN — SODIUM CHLORIDE, POTASSIUM CHLORIDE, SODIUM LACTATE AND CALCIUM CHLORIDE: 600; 310; 30; 20 INJECTION, SOLUTION INTRAVENOUS at 00:28

## 2018-01-21 RX ADMIN — SODIUM CHLORIDE, POTASSIUM CHLORIDE, SODIUM LACTATE AND CALCIUM CHLORIDE 1000 ML: 600; 310; 30; 20 INJECTION, SOLUTION INTRAVENOUS at 09:34

## 2018-01-21 RX ADMIN — HYDROMORPHONE HYDROCHLORIDE 0.5 MG: 1 INJECTION, SOLUTION INTRAMUSCULAR; INTRAVENOUS; SUBCUTANEOUS at 00:29

## 2018-01-21 ASSESSMENT — PAIN DESCRIPTION - DESCRIPTORS
DESCRIPTORS: SHARP;SHOOTING
DESCRIPTORS: CONSTANT;SHOOTING
DESCRIPTORS: SHARP;SHOOTING
DESCRIPTORS: SHOOTING;SHARP

## 2018-01-21 NOTE — CONSULTS
GASTROENTEROLOGY CONSULTATION      Date of Admission:  1/20/2018          ASSESSMENT AND RECOMMENDATIONS:   Assessment:  Kitty Bangura is a 57 year old female with a history of acute necrotizing pancreatitis s/p multiple necrosectomies (see below) and recently diagnosed adenocarcinoma of pancreatic tail (MRI in 4/2004 with 2.5 cm rounded focus of increased T2 signal in the region of the pancreatic tail) started on cycle 1 FOLFIRINOX 1/15/18 who presented to the ED with complaints of abdominal pain, nausea and vomiting.     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 Had endoscopic necrosectomy on 12/4. She completed a week long course of antibiotics and developed fever. Repeat endoscopic necrosectomy 12/12 with copious amounts of pus and necrotic debris in cavity, which was completely removed and 2 new DPT Solus stents were placed which subsequently were passed in her stool.     This admission, LFTs normal, patient is afebrile and she does not have a leukocytosis. CT imaging revealing recurrent collection in pancreatic body and disconnected duct with pancreatitis. GI is consulted for further evaluation and management.      Recommendations:   - Will need ERCP with pancreatic stent placement this admission, timing pending   - Supportive care per primary service (pain control, IVF, etc)   - Diet as tolerated for now   - No indication for antibiotics at this time.     Gastroenterology outpatient follow up recommendations: Pending clinical course.    Thank you for involving us in this patient's care. Please do not hesitate to contact the GI service with any questions or concerns.     Pt care plan discussed with Dr. Sanches, GI staff physician.    Mami Richards MD  Gastroenterology  Fellow  -------------------------------------------------------------------------------------------------------------------          Chief Complaint:   We were asked by Angela Gamez of Heme/Onc to evaluate this patient with pancreatic adenocarcinoma, disconnected pancreatic duct, fluid collection.     History is obtained from the patient and the medical record.          History of Present Illness:   Kitty Bangura is a 57 year old female with a history of acute necrotizing pancreatitis s/p multiple necrosectomies (see above) and recently diagnosed adenocarcinoma of pancreatic tail (MRI in 4/2004 with 2.5 cm rounded focus of increased T2 signal in the region of the pancreatic tail) started on cycle 1 FOLFIRINOX 1/15/18 who presented to the ED with complaints of abdominal pain, nausea and vomiting.     Patient reported three days of worsening abdominal pain, nausea, vomiting and inability to tolerate oral intake on presentation. She denied fevers or chills.      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 Had endoscopic necrosectomy on 12/4. She completed a week long course of antibiotics and developed fever. Repeat endoscopic necrosectomy 12/12 with copious amounts of pus and necrotic debris in cavity, which was completely removed and 2 new DPT Solus stents were placed which subsequently were passed in her stool.             Past Medical History:   Reviewed and edited as appropriate  Past Medical History:   Diagnosis Date     Cancer (H)      Necrotizing pancreatitis      PONV (postoperative nausea and vomiting)             Past Surgical History:   Reviewed and edited as appropriate   Past Surgical History:   Procedure Laterality Date     ABDOMEN SURGERY  1983    Ruptured tubal  pregnancies, additional surgeries followed     BACK SURGERY  2004    L5, S1 discectomy     BREAST SURGERY      Breast reduction     COLONOSCOPY       ENDOSCOPIC ULTRASOUND, ESOPHAGOSCOPY, GASTROSCOPY, DUODENOSCOPY (EGD), NECROSECTOMY N/A 2017    Procedure: ENDOSCOPIC ULTRASOUND, ESOPHAGOSCOPY, GASTROSCOPY, DUODENOSCOPY (EGD), NECROSECTOMY;  Esophagogastroduodenoscopy, with  Necrosectomy and stents replacement  ;  Surgeon: González Metz MD;  Location: [facility] OR     ENDOSCOPIC ULTRASOUND, ESOPHAGOSCOPY, GASTROSCOPY, DUODENOSCOPY (EGD), NECROSECTOMY N/A 2017    Procedure: ENDOSCOPIC ULTRASOUND, ESOPHAGOSCOPY, GASTROSCOPY, DUODENOSCOPY (EGD), NECROSECTOMY;  Esophagogastroduodenoscopy with Necrosectomy, Balloon Dilation, stent removal X3 and Cystgastrostomy stent Placement X2;  Surgeon: Guru Cecilia Staples MD;  Location: [facility] OR     ESOPHAGOSCOPY, GASTROSCOPY, DUODENOSCOPY (EGD), COMBINED N/A 2017    Procedure: COMBINED ENDOSCOPIC ULTRASOUND, ESOPHAGOSCOPY, GASTROSCOPY, DUODENOSCOPY (EGD), FINE NEEDLE ASPIRATE/BIOPSY;  Endoscopic Ultrasound with cystgastrostomy and fine needle aspiration ;  Surgeon: González Metz MD;  Location: [facility] OR     GYN SURGERY      Multiple ectopic pregnancies, reconnect,       INSERT PORT VASCULAR ACCESS Right 2018    Procedure: INSERT PORT VASCULAR ACCESS;  Chest Port Placement - right;  Surgeon: Chanda Lawson PA-C;  Location: [facility] OR            Previous Endoscopy:   No results found for this or any previous visit.         Social History:   Reviewed and edited as appropriate  Social History     Social History     Marital status:      Spouse name: N/A     Number of children: N/A     Years of education: N/A     Occupational History     Not on file.     Social History Main Topics     Smoking status: Former Smoker     Packs/day: 0.50     Types: Cigarettes     Start date: 1974     Quit date:      Smokeless  tobacco: Never Used     Alcohol use No      Comment: Now quit since Oct 31     Drug use: No     Sexual activity: No     Other Topics Concern     Parent/Sibling W/ Cabg, Mi Or Angioplasty Before 65f 55m? Yes     Brother     Social History Narrative            Family History:   Reviewed and edited as appropriate  Family History   Problem Relation Age of Onset     HEART DISEASE Father      Hyperlipidemia Father      HEART DISEASE Brother      DIABETES Mother      Hypertension Mother      Obesity Mother      DIABETES Maternal Grandfather      Hypertension Maternal Grandfather      Obesity Maternal Grandfather      DIABETES Sister      Hypertension Sister      Depression Sister      Anxiety Disorder Sister      Obesity Sister      Hypertension Brother      Hyperlipidemia Brother      Breast Cancer Cousin      Breast Cancer Cousin      Breast Cancer Cousin      Colon Cancer Other      Other Cancer Other      Mesothelioma     Depression Sister      Anxiety Disorder Brother      Anxiety Disorder Son      MENTAL ILLNESS Sister      Schizophrenia     Obesity Maternal Grandmother      Obesity Sister        No known history of colorectal cancer, liver disease, or inflammatory bowel disease.       Allergies:   Reviewed and edited as appropriate   No Known Allergies         Medications:     Prescriptions Prior to Admission   Medication Sig Dispense Refill Last Dose     ondansetron (ZOFRAN) 8 MG tablet Take 1 tablet (8 mg) by mouth every 8 hours as needed for nausea 30 tablet 0 1/19/2018 at AM     LORazepam (ATIVAN) 0.5 MG tablet Take 1 tablet (0.5 mg) by mouth every 4 hours as needed (Anxiety, Nausea/Vomiting or Sleep) 30 tablet 2 1/20/2018 at 0400     prochlorperazine (COMPAZINE) 10 MG tablet Take 1 tablet (10 mg) by mouth every 6 hours as needed (Nausea/Vomiting) 30 tablet 2 Past Week at Unknown time     HYDROcodone-acetaminophen (NORCO) 5-325 MG per tablet Take 1 tablet by mouth every 6 hours as needed for moderate to severe  "pain   1/19/2018 at Unknown time     amylase-lipase-protease (CREON) 32162-26840 UNITS CPEP per EC capsule Take 2-3 with meals / 1-2 with snacks, up to 15 per day. 450 capsule 6 1/19/2018 at 1400     loperamide (IMODIUM) 2 MG capsule 2 caps at 1st sign of diarrhea & 1 cap every 2hrs until 12hrs diarrhea free. During night, 2 caps at bedtime & 2 caps every 4hrs until AM 30 capsule 0 not taken at Unknown time     acetaminophen (TYLENOL) 325 MG tablet Take 2 tablets (650 mg) by mouth every 4 hours as needed for mild pain 100 tablet  Unknown at Unknown time             Review of Systems:   A complete review of systems was performed and is negative except as noted in the HPI           Physical Exam:   BP 94/57 (BP Location: Right arm)  Pulse 63  Temp 97.5  F (36.4  C) (Axillary)  Resp 19  Ht 1.651 m (5' 5\")  Wt 73.9 kg (163 lb)  SpO2 96%  Breastfeeding? No  BMI 27.12 kg/m2  Wt:   Wt Readings from Last 2 Encounters:   01/20/18 73.9 kg (163 lb)   01/15/18 76.6 kg (168 lb 14.4 oz)      Constitutional: cooperative, pleasant, not dyspneic/diaphoretic, no acute distress  Eyes: Sclera anicteric  Ears/nose/mouth/throat: Hearing intact  Neck: Supple  CV: No edema  Respiratory: Unlabored breathing  Skin: warm, perfused, no jaundice  Neuro: AAO x 3  Psych: Normal affect  MSK: No gross deformities         Data:   Labs and imaging below were independently reviewed and interpreted    BMP  Recent Labs  Lab 01/21/18  0726 01/20/18  1230 01/15/18  0840    139 138   POTASSIUM 3.9 3.9 3.9   CHLORIDE 105 104 104   ILIANA 8.1* 8.6 8.9   CO2 26 28 28   BUN 15 22 13   CR 0.68 0.64 0.60   GLC 82 98 137*     CBC  Recent Labs  Lab 01/21/18  0726 01/20/18  1230 01/15/18  0840   WBC 6.4 9.2 7.1   RBC 3.55* 4.15 4.09   HGB 10.5* 12.4 12.4   HCT 33.2* 38.7 38.4   MCV 94 93 94   MCH 29.6 29.9 30.3   MCHC 31.6 32.0 32.3   RDW 13.7 13.8 13.4    257 293     INR  Recent Labs  Lab 01/20/18  1230   INR 1.08     LFTs  Recent Labs  Lab " 01/21/18  0726 01/20/18  1230 01/15/18  0840   ALKPHOS 60 73 60   AST 14 18 38   ALT 32 42 41   BILITOTAL 0.9 1.0 0.4   PROTTOTAL 5.7* 7.0 7.5   ALBUMIN 2.6* 3.2* 3.6      PANC  Recent Labs  Lab 01/20/18  1230   LIPASE 103       Imaging:  CT Abdomen/Pelvis 1/20/18  1. Acute pancreatitis with increased peripancreatic inflammation and  new peripancreatic fluid collections. No definite pancreatic necrosis,  and these collections may represent peripancreatic acute necrotic  collections or pseudocysts.  2. A 3.5 cm heterogeneously dense lesion with calcifications at the  junction of the pancreatic body and tail is not significantly changed,  consistent with patient's known adenocarcinoma.    Seen and examined with GI fellow, agree with findings and recommendations.    Vito Sanches MD GI Staff

## 2018-01-21 NOTE — PROGRESS NOTES
[facility], [place]    Progress Note  Hematology / Oncology     Date of Admission:  1/20/2018  Hospital Day #: 1   Date of Service (when I saw the patient): 01/21/2018    Assessment & Plan   Kitty Bangura is a 57 year old female with recently diagnosed pancreatic adenocarcinoma, started on cycle 1 FOLFIRINOX on 1/15/2018. Her course has been complicated by walled-off pancreatic necrosis and infected necrotizing pancreatitis for which she was hospitalized in December (requiring endoscopic necrosectomy). She presented to the ED for evaluation of a 3 day history of worsening abdominal pain, nausea and vomiting, and was found to have imaging findings consistent with acute pancreatitis.     Abdominal pain, nausea and vomiting - suspect secondary to acute pancreatitis.  Patient reports a 3 day history of worsening abdominal pain, now associated with nausea and vomiting over the last day or 2 and has been unable to eat or drink anything since yesterday. Labs and vitals are reassuring - no leukocytosis, lactate is not elevated, she is afebrile and hemodynamically stable. Lipase is not elevated. CT abd/pelvis this admission shows findings consistent with acute pancreatitis with increased peripancreatic inflammation and new peripancreatic fluid collections. There is no definite pancreatic necrosis, but these collections may represent peripancreatic acute necrotic collections or pseudocysts (per radiology).  -NPO with IV fluids for now (D5+NS+KCl at 100 ml/hr).  -Anti-emetics PRN.  -Pain control with hydromorphone 0.3-0.5 mg IV Q2H PRN.  -Defer antibiotics for now, but low threshold to start if she becomes febrile or decompensates clinically. Would likely cover with a combination of cefepime and metronidazole in this setting.  -Will consult GI given complex past h/o pancreatitis  -Transition to PCA for pain control, start at 0.1mg q10 mins.     Pancreatic adenocarcinoma.  Recently diagnosed  after she presented with abdominal pain in November 2017. She follows with Dr. Monk and was recently started on cycle 1 FOLFIRINOX on 1/15/2018. Her course has been complicated by walled-off pancreatic necrosis and infected necrotizing pancreatitis, for which she was hospitalized 12/9 to 12/13/2017 and underwent endoscopic necrosectomy. Imaging this admission most consistent with acute pancreatitis, with the previously demonstrated pancreatic tail mass appearing stable.  -Holding home pancreatic enzyme supplements while not eating.     FEN: NPO, D5+NS+KCl at 100 ml/hr, replete lytes PRN  Prophylaxis: mechanical  Code: FULL  Disposition: Admitted to the oncology service for management of acute pancreatitis versus necrotizing pancreatitis in the setting of recent initiation of chemotherapy for pancreatic adenocarcinoma. Anticipate discharge to home in the next several days pending clinical improvement.    This plan of care has been discussed with the staff physician, Dr. Heart.    Angeal Hui PA-C  Hematology/Oncology  Pager: [phone]    Interval History   Afebrile overnight. Kitty is doing ok. Continues to have abdominal pain, IV Dilaudid is helping some. No new questions or concerns this morning.     Vital Signs with Ranges  Temp:  [96.7  F (35.9  C)-98.8  F (37.1  C)] 97.2  F (36.2  C)  Pulse:  [59-62] 62  Heart Rate:  [58-71] 58  Resp:  [12-18] 18  BP: ()/(44-65) 103/65  SpO2:  [94 %-99 %] 95 %    I/O last 3 completed shifts:  In: 1570 [P.O.:40; I.V.:1530]  Out: 775 [Urine:775]    Vitals:    01/20/18 1105   Weight: 73.9 kg (163 lb)       Physical Exam   Constitutional: Pleasant and cooperative female seen lying in bed and up ad soni in room. Awake, alert, NAD.  HEENT: NC/AT, EOMI, sclera clear, conjunctiva normal  Respiratory: No increased work of breathing, CTAB, no crackles or wheezing.  Cardiovascular: RRR, no murmur noted. No peripheral edema.  GI: Normal bowel sounds, soft, non-distended.  Tender to palpation in L and R upper quadrants.  Skin: Warm, dry, well-perfused. No bruising, bleeding, rashes, or lesions on limited exam.  Neurologic: A&O. Answers questions appropriately. Moves all extremities spontaneously.  Neuropsychiatric: Calm, appropriate affect    Medications     dextrose 5% and 0.9% NaCl with potassium chloride 20 mEq           heparin lock flush  5-10 mL Intracatheter Q24H     heparin  5 mL Intracatheter Q28 Days       Data   CBC   Recent Labs  Lab 01/21/18  0726 01/20/18  1230 01/15/18  0840   WBC 6.4 9.2 7.1   RBC 3.55* 4.15 4.09   HGB 10.5* 12.4 12.4   HCT 33.2* 38.7 38.4   MCV 94 93 94   MCH 29.6 29.9 30.3   MCHC 31.6 32.0 32.3   RDW 13.7 13.8 13.4    257 293       CMP   Recent Labs  Lab 01/21/18  0726 01/20/18  1230 01/15/18  0840    139 138   POTASSIUM 3.9 3.9 3.9   CHLORIDE 105 104 104   CO2 26 28 28   ANIONGAP 6 6 7   GLC 82 98 137*   BUN 15 22 13   CR 0.68 0.64 0.60   GFRESTIMATED 89 >90 >90   GFRESTBLACK >90 >90 >90   ILIANA 8.1* 8.6 8.9   MAG 2.2 2.5*  --    PROTTOTAL 5.7* 7.0 7.5   ALBUMIN 2.6* 3.2* 3.6   BILITOTAL 0.9 1.0 0.4   ALKPHOS 60 73 60   AST 14 18 38   ALT 32 42 41       LFTs   Recent Labs  Lab 01/21/18  0726   PROTTOTAL 5.7*   ALBUMIN 2.6*   BILITOTAL 0.9   ALKPHOS 60   AST 14   ALT 32       Coagulation Studies   Recent Labs  Lab 01/20/18  1230   INR 1.08

## 2018-01-21 NOTE — PLAN OF CARE
Problem: Pain, Acute (Adult)  Goal: Identify Related Risk Factors and Signs and Symptoms  Related risk factors and signs and symptoms are identified upon initiation of Human Response Clinical Practice Guideline (CPG).   Outcome: No Change  Up with SBA of 1, voiding spontaneously. Had 3 unmeasured voiding this shift, no BM this shift. Bowel sound hypo active. Abdominal pain no changed with IV dilaudid, PCA ordered at 0.1 mg every 10 minutes with hour limit of 0.6 mg. Denies nausea and vomiting. Per pt as long as pain is managed, nausea is also controlled. NPO with minimal ice chips. Capnography continuous until tomorrow at 1310H per unit protocol. MIVF D5 0.9 Nacl + 20 meq of KCL at 100 cc/hr via port a cath. BP this am slightly elevated, text paged #[phone], orders received for 1L IVF bolus, BP improved after. PLAN: Continue monitor and managed abdominal pain, continue with the care plan. Pt would like to have bedside report.

## 2018-01-21 NOTE — PLAN OF CARE
Problem: Patient Care Overview  Goal: Plan of Care/Patient Progress Review  Outcome: No Change  Slightly hypotensive, baseline per pt. OVSS. Pain managed with IV dilaudid. Nausea controlled with IV zofran and IV compazine. Rt port infusing IVMF. NPO exc meds/ice chips. Pt states she isn't ready for oral meds yet. Voiding with good outputs. Last BM was yesterday. Passing gas. Up SBA. Plan is to d/c within the next couple days pending clinical improvement. Pt would like bedside report at shift change.

## 2018-01-21 NOTE — PLAN OF CARE
Problem: Patient Care Overview  Goal: Plan of Care/Patient Progress Review  A&Ox4. AVSS. Pt c/o upper abdominal pain associated with the pancreatitis, 0.5 mg PRN dilaudid given, pt states this has been helping her pain today. Pt also c/o nausea, PRN zofran given. Abdomen soft, but tender. Port accessed and infusing MIVF. Pt on NPO/ice chip diet. Pt explained she hasn't eaten anything d/t eating solid foods causing her pain. Pt up SBA, feeling lightheaded upon arrival to unit, pt encouraged and agreed to call for assistance in ambulating. Spouse at bedside attentive to pt's needs. Pt voiding spontaneously, good amounts. Pt having diarrhea, needs stool sample for c.diff testing, on enteric precautions. Continue POC.    Pt would like bedside report.

## 2018-01-22 LAB
ALBUMIN SERPL-MCNC: 2.8 G/DL (ref 3.4–5)
ALP SERPL-CCNC: 60 U/L (ref 40–150)
ALT SERPL W P-5'-P-CCNC: 30 U/L (ref 0–50)
ANION GAP SERPL CALCULATED.3IONS-SCNC: 6 MMOL/L (ref 3–14)
AST SERPL W P-5'-P-CCNC: 19 U/L (ref 0–45)
BASOPHILS # BLD AUTO: 0 10E9/L (ref 0–0.2)
BASOPHILS NFR BLD AUTO: 0.3 %
BILIRUB SERPL-MCNC: 0.9 MG/DL (ref 0.2–1.3)
BUN SERPL-MCNC: 7 MG/DL (ref 7–30)
CALCIUM SERPL-MCNC: 8.2 MG/DL (ref 8.5–10.1)
CHLORIDE SERPL-SCNC: 105 MMOL/L (ref 94–109)
CO2 SERPL-SCNC: 26 MMOL/L (ref 20–32)
CREAT SERPL-MCNC: 0.68 MG/DL (ref 0.52–1.04)
DIFFERENTIAL METHOD BLD: ABNORMAL
EOSINOPHIL # BLD AUTO: 0.2 10E9/L (ref 0–0.7)
EOSINOPHIL NFR BLD AUTO: 2.1 %
ERYTHROCYTE [DISTWIDTH] IN BLOOD BY AUTOMATED COUNT: 13.8 % (ref 10–15)
GFR SERPL CREATININE-BSD FRML MDRD: 88 ML/MIN/1.7M2
GLUCOSE SERPL-MCNC: 99 MG/DL (ref 70–99)
HCT VFR BLD AUTO: 33.6 % (ref 35–47)
HGB BLD-MCNC: 10.6 G/DL (ref 11.7–15.7)
IMM GRANULOCYTES # BLD: 0 10E9/L (ref 0–0.4)
IMM GRANULOCYTES NFR BLD: 0.3 %
LYMPHOCYTES # BLD AUTO: 3 10E9/L (ref 0.8–5.3)
LYMPHOCYTES NFR BLD AUTO: 39.2 %
MCH RBC QN AUTO: 29.6 PG (ref 26.5–33)
MCHC RBC AUTO-ENTMCNC: 31.5 G/DL (ref 31.5–36.5)
MCV RBC AUTO: 94 FL (ref 78–100)
MONOCYTES # BLD AUTO: 0.5 10E9/L (ref 0–1.3)
MONOCYTES NFR BLD AUTO: 7 %
NEUTROPHILS # BLD AUTO: 3.9 10E9/L (ref 1.6–8.3)
NEUTROPHILS NFR BLD AUTO: 51.1 %
NRBC # BLD AUTO: 0 10*3/UL
NRBC BLD AUTO-RTO: 0 /100
PLATELET # BLD AUTO: 211 10E9/L (ref 150–450)
POTASSIUM SERPL-SCNC: 4 MMOL/L (ref 3.4–5.3)
PROT SERPL-MCNC: 6.4 G/DL (ref 6.8–8.8)
RBC # BLD AUTO: 3.58 10E12/L (ref 3.8–5.2)
SODIUM SERPL-SCNC: 137 MMOL/L (ref 133–144)
WBC # BLD AUTO: 7.5 10E9/L (ref 4–11)

## 2018-01-22 PROCEDURE — 85025 COMPLETE CBC W/AUTO DIFF WBC: CPT | Performed by: PHYSICIAN ASSISTANT

## 2018-01-22 PROCEDURE — 99232 SBSQ HOSP IP/OBS MODERATE 35: CPT | Performed by: INTERNAL MEDICINE

## 2018-01-22 PROCEDURE — 25000128 H RX IP 250 OP 636: Performed by: PHYSICIAN ASSISTANT

## 2018-01-22 PROCEDURE — 25800025 ZZH RX 258: Performed by: PHYSICIAN ASSISTANT

## 2018-01-22 PROCEDURE — 36592 COLLECT BLOOD FROM PICC: CPT | Performed by: PHYSICIAN ASSISTANT

## 2018-01-22 PROCEDURE — 80053 COMPREHEN METABOLIC PANEL: CPT | Performed by: PHYSICIAN ASSISTANT

## 2018-01-22 PROCEDURE — 25000128 H RX IP 250 OP 636: Performed by: INTERNAL MEDICINE

## 2018-01-22 PROCEDURE — 25000125 ZZHC RX 250: Performed by: PHYSICIAN ASSISTANT

## 2018-01-22 PROCEDURE — 12000001 ZZH R&B MED SURG/OB UMMC

## 2018-01-22 RX ORDER — CEFEPIME 1 G/50ML
2 INJECTION, SOLUTION INTRAVENOUS EVERY 8 HOURS
Status: DISCONTINUED | OUTPATIENT
Start: 2018-01-22 | End: 2018-01-22

## 2018-01-22 RX ADMIN — POTASSIUM CHLORIDE, DEXTROSE MONOHYDRATE AND SODIUM CHLORIDE: 150; 5; 900 INJECTION, SOLUTION INTRAVENOUS at 09:29

## 2018-01-22 RX ADMIN — SODIUM CHLORIDE, POTASSIUM CHLORIDE, SODIUM LACTATE AND CALCIUM CHLORIDE 1000 ML: 600; 310; 30; 20 INJECTION, SOLUTION INTRAVENOUS at 10:32

## 2018-01-22 RX ADMIN — POTASSIUM CHLORIDE, DEXTROSE MONOHYDRATE AND SODIUM CHLORIDE: 150; 5; 900 INJECTION, SOLUTION INTRAVENOUS at 22:19

## 2018-01-22 RX ADMIN — PROCHLORPERAZINE EDISYLATE 5 MG: 5 INJECTION INTRAMUSCULAR; INTRAVENOUS at 06:37

## 2018-01-22 RX ADMIN — METRONIDAZOLE 500 MG: 500 INJECTION, SOLUTION INTRAVENOUS at 09:29

## 2018-01-22 RX ADMIN — ONDANSETRON 8 MG: 2 INJECTION INTRAMUSCULAR; INTRAVENOUS at 01:14

## 2018-01-22 ASSESSMENT — PAIN DESCRIPTION - DESCRIPTORS
DESCRIPTORS: SHOOTING
DESCRIPTORS: SORE
DESCRIPTORS: SORE

## 2018-01-22 NOTE — TELEPHONE ENCOUNTER
Prior Authorization Approval    Authorization Effective Date: 1/15/2018  Authorization Expiration Date: 1/20/2019  Medication: 5FU - Fluorouracil  4490mg cont-APPROVED  Approved Dose/Quantity: 4490mg cont  Reference #: [id number]   Insurance Company: [employer] - Phone [phone] Fax [phone]  Expected CoPay: n/a     Which Pharmacy is filling the prescription (Not needed for infusion/clinic administered): [facility]  Pharmacy Notified: No  Patient Notified: No        Will document approval letter once recieved  Called [employer] at [phone], to have them backdate request to 01/15/18 as requested on forms.  Representative at [employer] was able to backdate this request to 01/15/18.

## 2018-01-22 NOTE — PROGRESS NOTES
GASTROENTEROLOGY PROGRESS NOTE  Date of Service: 01/22/2018    ASSESSMENT:  Kitty Bangura is a 57 year old female with a history of acute necrotizing pancreatitis s/p multiple necrosectomies (see below) and recently diagnosed adenocarcinoma of pancreatic tail (MRI in 4/2004 with 2.5 cm rounded focus of increased T2 signal in the region of the pancreatic tail) started on cycle 1 FOLFIRINOX 1/15/18 who presented to the ED with complaints of abdominal pain, nausea and vomiting.      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 Had endoscopic necrosectomy on 12/4. She completed a week long course of antibiotics and developed fever. Repeat endoscopic necrosectomy 12/12 with copious amounts of pus and necrotic debris in cavity, which was completely removed and 2 new DPT Solus stents were placed which subsequently were passed in her stool.      This admission, LFTs normal, patient is afebrile and she does not have a leukocytosis. CT imaging revealing recurrent collection in pancreatic body and disconnected duct with pancreatitis. GI is consulted for further evaluation and management.     RECOMMENDATIONS:   - Will discuss timing of pancreatic duct stent placement with advanced endoscopy team (likely end of week)   - No indication for antibiotics from GI/pancreatitis standpoint   - Could trial clear liquids, if tolerated   - GI will continue to follow along. Please feel free to contact our service with any questions or concerns regarding the care of this patient.     The patient was discussed and plan agreed upon with GI staff.    Mami Richards MD  GI Consult Service  _______________________________________________________________  S: No acute events overnight. Nursing notes reviewed. Patient  "notes abdominal pain is controlled with hydromorphone PCA. No nausea or vomiting. No shortness of breath or chest pain. Ambulating around unit.  sitting with patient at bedside.     O:  Blood pressure 98/48, pulse 68, temperature 99.3  F (37.4  C), temperature source Oral, resp. rate 16, height 1.651 m (5' 5\"), weight 76.2 kg (168 lb 1.6 oz), SpO2 96 %, not currently breastfeeding.    Gen: Alert, NAD  HEENT: NC/AT  CV: No edema  Lungs: No increased work of breathing  Abd: Soft, epigastric/left upper abdomen TTP, no guarding or peritoneal signs  Skin: No jaundice  MS: WWP  Neuro: Alert and oriented      LABS:  BMP  Recent Labs  Lab 01/22/18  0948 01/21/18  0726 01/20/18  1230    137 139   POTASSIUM 4.0 3.9 3.9   CHLORIDE 105 105 104   ILIANA 8.2* 8.1* 8.6   CO2 26 26 28   BUN 7 15 22   CR 0.68 0.68 0.64   GLC 99 82 98     CBC  Recent Labs  Lab 01/22/18  0948 01/21/18  0726 01/20/18  1230   WBC 7.5 6.4 9.2   RBC 3.58* 3.55* 4.15   HGB 10.6* 10.5* 12.4   HCT 33.6* 33.2* 38.7   MCV 94 94 93   MCH 29.6 29.6 29.9   MCHC 31.5 31.6 32.0   RDW 13.8 13.7 13.8    192 257     INR  Recent Labs  Lab 01/20/18  1230   INR 1.08     LFTs  Recent Labs  Lab 01/22/18  0948 01/21/18  0726 01/20/18  1230   ALKPHOS 60 60 73   AST 19 14 18   ALT 30 32 42   BILITOTAL 0.9 0.9 1.0   PROTTOTAL 6.4* 5.7* 7.0   ALBUMIN 2.8* 2.6* 3.2*      PANC  Recent Labs  Lab 01/20/18  1230   LIPASE 103       "

## 2018-01-22 NOTE — PLAN OF CARE
Problem: Patient Care Overview  Goal: Plan of Care/Patient Progress Review  Outcome: Therapy, progress toward functional goals is gradual  Pt hypotensive, MD paged, see previous note. OVSS. Pain managed with dilaudid PCA at 0.2 q 10 min. Capno in place. IV zofran given overnight for nausea. Rt port infusing IVMF. Passing gas, no BM overnight. Voiding with good outputs. NPO exc meds/ice chips. Up SBA, calls appropriately. Plan is to d/c pending clinical improvement and MD decision on fluid in pancreas (ERCP with pancreatic stent placement this admission). Pt would like bedside report at shift change.     Addendum: MD called back about low BP's, want to hold off on a bolus and just continue to monitor. MD is not concerned.

## 2018-01-22 NOTE — PLAN OF CARE
Problem: Patient Care Overview  Goal: Plan of Care/Patient Progress Review  Outcome: No Change  VSS. Pt experiencing increased pain at beginning of shift, increased Dilaudid PCA 0.2mg/10min. Pt stated pain is better controlled. Up SBA with . Denies nausea. NPO. Port infusing IVMF. Continue to monitor.

## 2018-01-22 NOTE — PLAN OF CARE
Problem: Patient Care Overview  Goal: Plan of Care/Patient Progress Review  Outcome: Improving  Patient reports good pain control with Dilaudid PCA. 1L LR bolus administered for low BP with some improvement. NPO except for ice chips. Declined anti-nausea medication this AM. Reports passing flatus, no BM since Fri 1/19 (per pt), declined bowel meds. Voiding with good output. Showered. Up ad soni, ambulating in halls with spouse. Continue with POC. Patient would like bedside report.

## 2018-01-22 NOTE — PROVIDER NOTIFICATION
Pt recent BP 84/49, below parameters, Heme/Onc moonlighter paged.     Addendum: Paged agiin 45 mins later a second time, awaiting response.

## 2018-01-23 LAB
ALBUMIN SERPL-MCNC: 2.7 G/DL (ref 3.4–5)
ALP SERPL-CCNC: 64 U/L (ref 40–150)
ALT SERPL W P-5'-P-CCNC: 29 U/L (ref 0–50)
ANION GAP SERPL CALCULATED.3IONS-SCNC: 9 MMOL/L (ref 3–14)
AST SERPL W P-5'-P-CCNC: 21 U/L (ref 0–45)
BASOPHILS # BLD AUTO: 0 10E9/L (ref 0–0.2)
BASOPHILS NFR BLD AUTO: 0.1 %
BILIRUB SERPL-MCNC: 0.9 MG/DL (ref 0.2–1.3)
BUN SERPL-MCNC: 5 MG/DL (ref 7–30)
CALCIUM SERPL-MCNC: 8.5 MG/DL (ref 8.5–10.1)
CHLORIDE SERPL-SCNC: 106 MMOL/L (ref 94–109)
CO2 SERPL-SCNC: 25 MMOL/L (ref 20–32)
CREAT SERPL-MCNC: 0.6 MG/DL (ref 0.52–1.04)
DIFFERENTIAL METHOD BLD: ABNORMAL
EOSINOPHIL # BLD AUTO: 0.2 10E9/L (ref 0–0.7)
EOSINOPHIL NFR BLD AUTO: 2.2 %
ERYTHROCYTE [DISTWIDTH] IN BLOOD BY AUTOMATED COUNT: 14 % (ref 10–15)
GFR SERPL CREATININE-BSD FRML MDRD: >90 ML/MIN/1.7M2
GLUCOSE SERPL-MCNC: 105 MG/DL (ref 70–99)
HCT VFR BLD AUTO: 32.6 % (ref 35–47)
HGB BLD-MCNC: 10.1 G/DL (ref 11.7–15.7)
IMM GRANULOCYTES # BLD: 0 10E9/L (ref 0–0.4)
IMM GRANULOCYTES NFR BLD: 0.1 %
LYMPHOCYTES # BLD AUTO: 2.3 10E9/L (ref 0.8–5.3)
LYMPHOCYTES NFR BLD AUTO: 31.1 %
MCH RBC QN AUTO: 29.1 PG (ref 26.5–33)
MCHC RBC AUTO-ENTMCNC: 31 G/DL (ref 31.5–36.5)
MCV RBC AUTO: 94 FL (ref 78–100)
MONOCYTES # BLD AUTO: 0.7 10E9/L (ref 0–1.3)
MONOCYTES NFR BLD AUTO: 8.9 %
NEUTROPHILS # BLD AUTO: 4.3 10E9/L (ref 1.6–8.3)
NEUTROPHILS NFR BLD AUTO: 57.6 %
NRBC # BLD AUTO: 0 10*3/UL
NRBC BLD AUTO-RTO: 0 /100
PLATELET # BLD AUTO: 199 10E9/L (ref 150–450)
POTASSIUM SERPL-SCNC: 4.2 MMOL/L (ref 3.4–5.3)
PROT SERPL-MCNC: 6.3 G/DL (ref 6.8–8.8)
RBC # BLD AUTO: 3.47 10E12/L (ref 3.8–5.2)
SODIUM SERPL-SCNC: 140 MMOL/L (ref 133–144)
WBC # BLD AUTO: 7.4 10E9/L (ref 4–11)

## 2018-01-23 PROCEDURE — 80053 COMPREHEN METABOLIC PANEL: CPT | Performed by: PHYSICIAN ASSISTANT

## 2018-01-23 PROCEDURE — 25000128 H RX IP 250 OP 636: Performed by: INTERNAL MEDICINE

## 2018-01-23 PROCEDURE — 25800025 ZZH RX 258: Performed by: PHYSICIAN ASSISTANT

## 2018-01-23 PROCEDURE — 25000128 H RX IP 250 OP 636: Performed by: PHYSICIAN ASSISTANT

## 2018-01-23 PROCEDURE — 99232 SBSQ HOSP IP/OBS MODERATE 35: CPT | Performed by: INTERNAL MEDICINE

## 2018-01-23 PROCEDURE — 25000132 ZZH RX MED GY IP 250 OP 250 PS 637: Performed by: PHYSICIAN ASSISTANT

## 2018-01-23 PROCEDURE — 85025 COMPLETE CBC W/AUTO DIFF WBC: CPT | Performed by: PHYSICIAN ASSISTANT

## 2018-01-23 PROCEDURE — 12000001 ZZH R&B MED SURG/OB UMMC

## 2018-01-23 PROCEDURE — 36592 COLLECT BLOOD FROM PICC: CPT | Performed by: PHYSICIAN ASSISTANT

## 2018-01-23 RX ADMIN — PANCRELIPASE 24000 UNITS: 24000; 76000; 120000 CAPSULE, DELAYED RELEASE PELLETS ORAL at 17:38

## 2018-01-23 RX ADMIN — Medication: at 18:31

## 2018-01-23 RX ADMIN — POTASSIUM CHLORIDE, DEXTROSE MONOHYDRATE AND SODIUM CHLORIDE: 150; 5; 900 INJECTION, SOLUTION INTRAVENOUS at 18:20

## 2018-01-23 RX ADMIN — Medication: at 22:12

## 2018-01-23 RX ADMIN — PANCRELIPASE 24000 UNITS: 24000; 76000; 120000 CAPSULE, DELAYED RELEASE PELLETS ORAL at 14:40

## 2018-01-23 RX ADMIN — ENOXAPARIN SODIUM 40 MG: 40 INJECTION SUBCUTANEOUS at 13:32

## 2018-01-23 RX ADMIN — POTASSIUM CHLORIDE, DEXTROSE MONOHYDRATE AND SODIUM CHLORIDE: 150; 5; 900 INJECTION, SOLUTION INTRAVENOUS at 08:58

## 2018-01-23 ASSESSMENT — PAIN DESCRIPTION - DESCRIPTORS
DESCRIPTORS: ACHING;SORE
DESCRIPTORS: ACHING
DESCRIPTORS: SORE
DESCRIPTORS: SORE

## 2018-01-23 NOTE — PLAN OF CARE
"Problem: Patient Care Overview  Goal: Plan of Care/Patient Progress Review  Outcome: No Change  /68 (BP Location: Right arm)  Pulse 79  Temp 96.9  F (36.1  C) (Oral)  Resp 16  Ht 1.651 m (5' 5\")  Wt 76.2 kg (168 lb 1.6 oz)  SpO2 92%  Breastfeeding? No  BMI 27.97 kg/m2    Pain managed with PCA at 0.2mg q10min. Denies nausea, pt was advanced to clears. Creon ordered. Port running MIVF. Passing gas. No BM. Per GI will possibly do an ERCP on Thursday, pt aware. Up ad soni. Sputum culture to be collected, pt aware. Pt would not like bedside report.       "

## 2018-01-23 NOTE — PLAN OF CARE
Problem: Patient Care Overview  Goal: Plan of Care/Patient Progress Review  Outcome: No Change  VSS. Pain controlled on Dilaudid. +BS, +flatus, no BM since 1/19 but refusing bowel meds. Voiding not saving. Denies nausea. NPO +ice chips. Up ad soni. Pt walked multiple times tonight. Plan: Continue to monitor. Pt would not like bedside report.

## 2018-01-23 NOTE — PROGRESS NOTES
[facility], [place]    Hematology / Oncology Progress Note    Date of Service (when I saw the patient): 01/22/2018     Assessment & Plan   Kitty Bangura is a 57 year old female with recently diagnosed pancreatic adenocarcinoma, started on cycle 1 FOLFIRINOX on 1/15/2018. Her course has been complicated by walled-off pancreatic necrosis and infected necrotizing pancreatitis for which she was hospitalized in December (requiring endoscopic necrosectomy). She presented to the ED for evaluation of a 3 day history of worsening abdominal pain, nausea and vomiting, and was found to have imaging findings consistent with acute pancreatitis.      #Abdominal pain, nausea and vomiting   #Acute pancreatitis.  Patient reports a 3 day history of worsening abdominal pain, now associated with nausea and vomiting over the last day or 2 and has been unable to eat or drink anything since yesterday. Labs and vitals are reassuring - no leukocytosis, lactate is not elevated, she is afebrile and hemodynamically stable. Lipase is not elevated. CT abd/pelvis this admission shows findings consistent with acute pancreatitis with increased peripancreatic inflammation and new peripancreatic fluid collections. There is no definite pancreatic necrosis, but these collections may represent peripancreatic acute necrotic collections or pseudocysts (per radiology).  - GI consult. Will likely perform ERCP with pancreatic stent placement. Would like to discuss logistics with surgery first. Possible procedure towards end of week.   - NPO with IV fluids for now (D5+NS+KCl at 100 ml/hr).  - Anti-emetics PRN.  - Pain control with Hydromorphone PCA  - Defer antibiotics for now, but low threshold to start if she becomes febrile or decompensates clinically. Would likely cover with a combination of cefepime and metronidazole in this setting. Of note, did receive a dose of IV Cefepime/Flagyl for hypotension and concern for infection  on 1/22. With no leukocytosis and improvement in BP with IVF, antibiotics were d/c'd.       #Pancreatic adenocarcinoma.  Recently diagnosed after she presented with abdominal pain in November 2017. She follows with Dr. Monk and was recently started on cycle 1 FOLFIRINOX on 1/15/2018. Her course has been complicated by walled-off pancreatic necrosis and infected necrotizing pancreatitis, for which she was hospitalized 12/9 to 12/13/2017 and underwent endoscopic necrosectomy. Imaging this admission most consistent with acute pancreatitis, with the previously demonstrated pancreatic tail mass appearing stable.  - Holding home pancreatic enzyme supplements while not eating.      FEN:   - NPO   - D5+NS+KCl at 100 ml/hr  - Replete lytes PRN    Prophylaxis:   - VTE: Mechanical    Code: FULL    Disposition: Anticipate discharge to home in the next several days pending clinical improvement and surgical intervention, possible by 1/25.      This plan of care has been discussed with the staff physician, Dr. Heart.     Venus Potts PA-C  Hematology/Oncology  Pager: [phone]    Interval History   Reports feeling better today. The Dilaudid PCA is controlling her pain well. Spoke with GI team regarding endoscopic intervention sometime this week likely. At this point will continue conservative management.  in the room and supportive.     Physical Exam   Temp: 99.4  F (37.4  C) Temp src: Oral BP: 97/57 Pulse: 68 Heart Rate: 77 Resp: 15 SpO2: 93 % O2 Device: None (Room air)    Vitals:    01/20/18 1105 01/22/18 0956   Weight: 73.9 kg (163 lb) 76.2 kg (168 lb 1.6 oz)     Vital Signs with Ranges  Temp:  [98.8  F (37.1  C)-99.7  F (37.6  C)] 99.4  F (37.4  C)  Pulse:  [68] 68  Heart Rate:  [64-77] 77  Resp:  [15-16] 15  BP: (83-98)/(48-57) 97/57  SpO2:  [93 %-96 %] 93 %  I/O last 3 completed shifts:  In: 3381.67 [P.O.:50; I.V.:2331.67; IV Piggyback:1000]  Out: 1750 [Urine:1750]    Constitutional: Pleasant female seen  laying in bed. No apparent distress, and appears stated age.  Eyes: Lids and lashes normal, sclera clear, conjunctiva normal.  ENT: Normocephalic, oral pharynx with moist mucus membranes, tonsils without erythema or exudates, gums normal and good dentition.   Respiratory: No increased work of breathing, good air exchange, clear to auscultation bilaterally, no crackles or wheezing.  Cardiovascular: Regular rate and rhythm, normal S1 and S2, and no murmur noted.  GI: No masses or scars. +BS. Soft. Tenderness on palpation to epigastric region.  Skin: No bruising or bleeding, no redness, warmth, or swelling, no rashes, no lesions, no jaundice.  Extremities: There is no redness, warmth, or swelling of the joints. No lower extremity edema. No cyanosis.  Neurologic: Awake, alert, oriented to name, place and time.    Vascular access: PIV, c/d/i    Medications     dextrose 5% and 0.9% NaCl with potassium chloride 20 mEq 100 mL/hr at 01/22/18 0929       HYDROmorphone   Intravenous PCA     heparin lock flush  5-10 mL Intracatheter Q24H     heparin  5 mL Intracatheter Q28 Days       Data   ROUTINE IP LABS (Last four results)  BMP  Recent Labs  Lab 01/22/18  0948 01/21/18  0726 01/20/18  1230    137 139   POTASSIUM 4.0 3.9 3.9   CHLORIDE 105 105 104   ILIANA 8.2* 8.1* 8.6   CO2 26 26 28   BUN 7 15 22   CR 0.68 0.68 0.64   GLC 99 82 98     CBC  Recent Labs  Lab 01/22/18  0948 01/21/18  0726 01/20/18  1230   WBC 7.5 6.4 9.2   RBC 3.58* 3.55* 4.15   HGB 10.6* 10.5* 12.4   HCT 33.6* 33.2* 38.7   MCV 94 94 93   MCH 29.6 29.6 29.9   MCHC 31.5 31.6 32.0   RDW 13.8 13.7 13.8    192 257     INR  Recent Labs  Lab 01/20/18  1230   INR 1.08

## 2018-01-23 NOTE — PLAN OF CARE
Problem: Patient Care Overview  Goal: Plan of Care/Patient Progress Review  Outcome: No Change  VSS. Pain controlled on Dilaudid PCA 0.2mg/10min. Pt felt feverish around 2000, temp 100.1 but not within notifying parameters. Most recent temp 99.7. +gas no BM since 1/19 but refusing bowel meds. Voiding not saving. Denies nausea. NPO +ice chips. Up ad soni. Continue to monitor.

## 2018-01-23 NOTE — PROGRESS NOTES
[facility], [place]    Hematology / Oncology Progress Note    Date of Service (when I saw the patient): 01/23/2018     Assessment & Plan   Kitty Bangura is a 57 year old female with recently diagnosed pancreatic adenocarcinoma, started on cycle 1 FOLFIRINOX on 1/15/2018. Her course has been complicated by walled-off pancreatic necrosis and infected necrotizing pancreatitis for which she was hospitalized in December (requiring endoscopic necrosectomy). She presented to the ED for evaluation of a 3 day history of worsening abdominal pain, nausea and vomiting, and was found to have imaging findings consistent with acute pancreatitis.      #Abdominal pain, nausea and vomiting   #Acute pancreatitis.  Patient reports a 3 day history of worsening abdominal pain, now associated with nausea and vomiting over the last day or 2 and has been unable to eat or drink anything since yesterday. Labs and vitals are reassuring - no leukocytosis, lactate is not elevated, she is afebrile and hemodynamically stable. Lipase is not elevated. CT abd/pelvis this admission shows findings consistent with acute pancreatitis with increased peripancreatic inflammation and new peripancreatic fluid collections. There is no definite pancreatic necrosis, but these collections may represent peripancreatic acute necrotic collections or pseudocysts (per radiology).  - GI consult. Will likely perform ERCP with pancreatic stent placement. Would like to discuss logistics with surgery at conference on Thursday. Possible procedure towards end of week.   - Advance to CLD  - IVF D5+NS+KCl at 100 ml/hr  - Anti-emetics PRN.  - Pain control with Hydromorphone PCA  - Defer antibiotics for now, but low threshold to start if she becomes febrile or decompensates clinically. Would likely cover with a combination of cefepime and metronidazole in this setting. Of note, did receive a dose of IV Cefepime/Flagyl for hypotension and concern  for infection on 1/22. With no leukocytosis and improvement in BP with IVF, antibiotics were d/c'd.       #Pancreatic adenocarcinoma.  Recently diagnosed after she presented with abdominal pain in November 2017. She follows with Dr. Monk and was recently started on cycle 1 FOLFIRINOX on 1/15/2018. Her course has been complicated by walled-off pancreatic necrosis and infected necrotizing pancreatitis, for which she was hospitalized 12/9 to 12/13/2017 and underwent endoscopic necrosectomy. Imaging this admission most consistent with acute pancreatitis, with the previously demonstrated pancreatic tail mass appearing stable.  - Will add back pancreatic enzymes with advancement in diet.      FEN:   - CLD  - D5+NS+KCl at 100 ml/hr  - Replete lytes PRN    Prophylaxis:   - VTE: Mechanical    Code: FULL    Disposition: Anticipate discharge to home in the next several days pending clinical improvement and surgical intervention, possible by 1/25.      This plan of care has been discussed with the staff physician, Dr. Heart.     Venus Potts PA-C  Hematology/Oncology  Pager: [phone]    Interval History   Reports feeling about the same today. More distended. Trying to walk the halls frequently. Discussed advancing diet to clears. The Dilaudid PCA is controlling her pain well. Spoke with GI team regarding endoscopic intervention sometime this week likely. At this point will continue conservative management.     Physical Exam   Temp: 96.9  F (36.1  C) Temp src: Oral BP: 104/68 Pulse: 79 Heart Rate: 67 Resp: 16 SpO2: 92 % O2 Device: None (Room air)    Vitals:    01/20/18 1105 01/22/18 0956   Weight: 73.9 kg (163 lb) 76.2 kg (168 lb 1.6 oz)     Vital Signs with Ranges  Temp:  [96.9  F (36.1  C)-100.1  F (37.8  C)] 96.9  F (36.1  C)  Pulse:  [64-79] 79  Heart Rate:  [67-77] 67  Resp:  [15-16] 16  BP: ()/(56-70) 104/68  SpO2:  [92 %-96 %] 92 %  I/O last 3 completed shifts:  In: 3420 [I.V.:2420; IV Piggyback:1000]  Out:  850 [Urine:850]    Constitutional: Pleasant female seen laying in bed. No apparent distress, and appears stated age.  Eyes: Lids and lashes normal, sclera clear, conjunctiva normal.  ENT: Normocephalic, oral pharynx with moist mucus membranes, tonsils without erythema or exudates, gums normal and good dentition.   Respiratory: No increased work of breathing, good air exchange, clear to auscultation bilaterally, no crackles or wheezing.  Cardiovascular: Regular rate and rhythm, normal S1 and S2, and no murmur noted.  GI: No masses or scars. +BS. Soft. Tenderness on palpation to epigastric region.  Skin: No bruising or bleeding, no redness, warmth, or swelling, no rashes, no lesions, no jaundice.  Extremities: There is no redness, warmth, or swelling of the joints. No lower extremity edema. No cyanosis.  Neurologic: Awake, alert, oriented to name, place and time.    Vascular access: PIV, c/d/i    Medications     dextrose 5% and 0.9% NaCl with potassium chloride 20 mEq 100 mL/hr at 01/23/18 0858       amylase-lipase-protease  1-3 capsule Oral TID w/meals     enoxaparin  40 mg Subcutaneous Q24H     HYDROmorphone   Intravenous PCA     heparin lock flush  5-10 mL Intracatheter Q24H     heparin  5 mL Intracatheter Q28 Days       Data   ROUTINE IP LABS (Last four results)  BMP    Recent Labs  Lab 01/23/18  0845 01/22/18  0948 01/21/18  0726 01/20/18  1230    137 137 139   POTASSIUM 4.2 4.0 3.9 3.9   CHLORIDE 106 105 105 104   ILIANA 8.5 8.2* 8.1* 8.6   CO2 25 26 26 28   BUN 5* 7 15 22   CR 0.60 0.68 0.68 0.64   * 99 82 98     CBC    Recent Labs  Lab 01/23/18  0845 01/22/18  0948 01/21/18  0726 01/20/18  1230   WBC 7.4 7.5 6.4 9.2   RBC 3.47* 3.58* 3.55* 4.15   HGB 10.1* 10.6* 10.5* 12.4   HCT 32.6* 33.6* 33.2* 38.7   MCV 94 94 94 93   MCH 29.1 29.6 29.6 29.9   MCHC 31.0* 31.5 31.6 32.0   RDW 14.0 13.8 13.7 13.8    211 192 257     INR    Recent Labs  Lab 01/20/18  1230   INR 1.08

## 2018-01-24 ENCOUNTER — APPOINTMENT (OUTPATIENT)
Dept: PHYSICAL THERAPY | Facility: CLINIC | Age: 58
DRG: 435 | End: 2018-01-24
Attending: PHYSICIAN ASSISTANT
Payer: COMMERCIAL

## 2018-01-24 LAB
ALBUMIN SERPL-MCNC: 2.7 G/DL (ref 3.4–5)
ALP SERPL-CCNC: 69 U/L (ref 40–150)
ALT SERPL W P-5'-P-CCNC: 35 U/L (ref 0–50)
ANION GAP SERPL CALCULATED.3IONS-SCNC: 8 MMOL/L (ref 3–14)
AST SERPL W P-5'-P-CCNC: 23 U/L (ref 0–45)
BACTERIA SPEC CULT: ABNORMAL
BACTERIA SPEC CULT: ABNORMAL
BASOPHILS # BLD AUTO: 0 10E9/L (ref 0–0.2)
BASOPHILS NFR BLD AUTO: 0 %
BILIRUB SERPL-MCNC: 0.8 MG/DL (ref 0.2–1.3)
BUN SERPL-MCNC: 4 MG/DL (ref 7–30)
CALCIUM SERPL-MCNC: 8.5 MG/DL (ref 8.5–10.1)
CHLORIDE SERPL-SCNC: 108 MMOL/L (ref 94–109)
CO2 SERPL-SCNC: 25 MMOL/L (ref 20–32)
CREAT SERPL-MCNC: 0.56 MG/DL (ref 0.52–1.04)
DIFFERENTIAL METHOD BLD: ABNORMAL
EOSINOPHIL # BLD AUTO: 0.2 10E9/L (ref 0–0.7)
EOSINOPHIL NFR BLD AUTO: 2.9 %
ERYTHROCYTE [DISTWIDTH] IN BLOOD BY AUTOMATED COUNT: 14 % (ref 10–15)
GFR SERPL CREATININE-BSD FRML MDRD: >90 ML/MIN/1.7M2
GLUCOSE SERPL-MCNC: 113 MG/DL (ref 70–99)
GRAM STN SPEC: ABNORMAL
HCT VFR BLD AUTO: 31.2 % (ref 35–47)
HGB BLD-MCNC: 9.6 G/DL (ref 11.7–15.7)
IMM GRANULOCYTES # BLD: 0 10E9/L (ref 0–0.4)
IMM GRANULOCYTES NFR BLD: 0.2 %
LYMPHOCYTES # BLD AUTO: 1.7 10E9/L (ref 0.8–5.3)
LYMPHOCYTES NFR BLD AUTO: 32.7 %
Lab: ABNORMAL
MCH RBC QN AUTO: 29.1 PG (ref 26.5–33)
MCHC RBC AUTO-ENTMCNC: 30.8 G/DL (ref 31.5–36.5)
MCV RBC AUTO: 95 FL (ref 78–100)
MONOCYTES # BLD AUTO: 0.7 10E9/L (ref 0–1.3)
MONOCYTES NFR BLD AUTO: 13.2 %
NEUTROPHILS # BLD AUTO: 2.6 10E9/L (ref 1.6–8.3)
NEUTROPHILS NFR BLD AUTO: 51 %
NRBC # BLD AUTO: 0 10*3/UL
NRBC BLD AUTO-RTO: 0 /100
PLATELET # BLD AUTO: 217 10E9/L (ref 150–450)
POTASSIUM SERPL-SCNC: 4.2 MMOL/L (ref 3.4–5.3)
PROT SERPL-MCNC: 6.3 G/DL (ref 6.8–8.8)
RBC # BLD AUTO: 3.3 10E12/L (ref 3.8–5.2)
SODIUM SERPL-SCNC: 141 MMOL/L (ref 133–144)
SPECIMEN SOURCE: ABNORMAL
SPECIMEN SOURCE: ABNORMAL
WBC # BLD AUTO: 5.2 10E9/L (ref 4–11)

## 2018-01-24 PROCEDURE — 25000132 ZZH RX MED GY IP 250 OP 250 PS 637: Performed by: INTERNAL MEDICINE

## 2018-01-24 PROCEDURE — 85025 COMPLETE CBC W/AUTO DIFF WBC: CPT | Performed by: PHYSICIAN ASSISTANT

## 2018-01-24 PROCEDURE — 12000001 ZZH R&B MED SURG/OB UMMC

## 2018-01-24 PROCEDURE — 25800025 ZZH RX 258: Performed by: PHYSICIAN ASSISTANT

## 2018-01-24 PROCEDURE — 36592 COLLECT BLOOD FROM PICC: CPT | Performed by: PHYSICIAN ASSISTANT

## 2018-01-24 PROCEDURE — 99233 SBSQ HOSP IP/OBS HIGH 50: CPT | Performed by: INTERNAL MEDICINE

## 2018-01-24 PROCEDURE — 80053 COMPREHEN METABOLIC PANEL: CPT | Performed by: PHYSICIAN ASSISTANT

## 2018-01-24 PROCEDURE — 25000132 ZZH RX MED GY IP 250 OP 250 PS 637: Performed by: PHYSICIAN ASSISTANT

## 2018-01-24 PROCEDURE — 87205 SMEAR GRAM STAIN: CPT | Performed by: PHYSICIAN ASSISTANT

## 2018-01-24 PROCEDURE — 25000128 H RX IP 250 OP 636: Performed by: PHYSICIAN ASSISTANT

## 2018-01-24 PROCEDURE — 40000893 ZZH STATISTIC PT IP EVAL DEFER

## 2018-01-24 RX ADMIN — PANCRELIPASE 48000 UNITS: 24000; 76000; 120000 CAPSULE, DELAYED RELEASE PELLETS ORAL at 18:03

## 2018-01-24 RX ADMIN — PANCRELIPASE 24000 UNITS: 24000; 76000; 120000 CAPSULE, DELAYED RELEASE PELLETS ORAL at 08:02

## 2018-01-24 RX ADMIN — PANCRELIPASE 48000 UNITS: 24000; 76000; 120000 CAPSULE, DELAYED RELEASE PELLETS ORAL at 11:24

## 2018-01-24 RX ADMIN — POTASSIUM CHLORIDE, DEXTROSE MONOHYDRATE AND SODIUM CHLORIDE: 150; 5; 900 INJECTION, SOLUTION INTRAVENOUS at 05:07

## 2018-01-24 RX ADMIN — SENNOSIDES AND DOCUSATE SODIUM 1 TABLET: 8.6; 5 TABLET ORAL at 08:04

## 2018-01-24 RX ADMIN — ENOXAPARIN SODIUM 40 MG: 40 INJECTION SUBCUTANEOUS at 14:10

## 2018-01-24 RX ADMIN — POTASSIUM CHLORIDE, DEXTROSE MONOHYDRATE AND SODIUM CHLORIDE: 150; 5; 900 INJECTION, SOLUTION INTRAVENOUS at 16:10

## 2018-01-24 RX ADMIN — SENNOSIDES AND DOCUSATE SODIUM 1 TABLET: 8.6; 5 TABLET ORAL at 11:56

## 2018-01-24 ASSESSMENT — PAIN DESCRIPTION - DESCRIPTORS
DESCRIPTORS: ACHING
DESCRIPTORS: ACHING;CRAMPING

## 2018-01-24 NOTE — PROGRESS NOTES
This is a recent snapshot of the patient's [facility] medical record.  For current drug dose and complete information and questions, call [phone] or In Basket pool, [facility] home infusion ([id number])  CSN Number:  [id number]

## 2018-01-24 NOTE — PLAN OF CARE
"Problem: Pain, Acute (Adult)  Goal: Identify Related Risk Factors and Signs and Symptoms  Related risk factors and signs and symptoms are identified upon initiation of Human Response Clinical Practice Guideline (CPG).   Outcome: No Change  BP 90/64 (BP Location: Right arm)  Pulse 65  Temp 97.8  F (36.6  C) (Axillary)  Resp 16  Ht 1.651 m (5' 5\")  Wt 75.6 kg (166 lb 9.6 oz)  SpO2 96%  Breastfeeding? No  BMI 27.72 kg/m2    Pain managed with PCA at 0.2mg q10min. Intermittent nausea with self relief. Tolerating clear liquid diet. Port infusing MIVF. Gave senna due to no BM since the 19th. Educated about constipation with opioids. Voiding not saving. Up ad soni. Pt would not like bedside report.     PLAN: Possible ERCP with stents tomorrow, start pre op scrubs tonight/pre op checklist        "

## 2018-01-24 NOTE — PLAN OF CARE
Problem: Patient Care Overview  Goal: Plan of Care/Patient Progress Review  PT 7C: Defer - PT orders acknowledged and appreciated. Pt has been up IND, mobilizing in halls, no concerns for house set-up, and reporting good social support. Encouraged pt to continue ambulation at least 4x/day to reduce risk of deconditioning. Pt eager to ambulate and reporting no concerns with this plan. Will complete orders at this time. Please re-order with change in status. Thank you for your referral.

## 2018-01-24 NOTE — PROGRESS NOTES
[facility], [place]    Hematology / Oncology Progress Note    Date of Service (when I saw the patient): 01/24/2018     Assessment & Plan   Kitty Bangura is a 57 year old female with recently diagnosed pancreatic adenocarcinoma, started on cycle 1 FOLFIRINOX on 1/15/2018. Her course has been complicated by walled-off pancreatic necrosis and infected necrotizing pancreatitis for which she was hospitalized in December (requiring endoscopic necrosectomy). She presented to the ED for evaluation of a 3 day history of worsening abdominal pain, nausea and vomiting, and was found to have imaging findings consistent with acute pancreatitis.      #Abdominal pain, nausea and vomiting   #Acute pancreatitis.  Patient reports a 3 day history of worsening abdominal pain, now associated with nausea and vomiting over the last day or 2 and has been unable to eat or drink anything since yesterday. Labs and vitals are reassuring - no leukocytosis, lactate is not elevated, she is afebrile and hemodynamically stable. Lipase is not elevated. CT abd/pelvis this admission shows findings consistent with acute pancreatitis with increased peripancreatic inflammation and new peripancreatic fluid collections. There is no definite pancreatic necrosis, but these collections may represent peripancreatic acute necrotic collections or pseudocysts (per radiology).  - GI consult. Will likely perform ERCP with pancreatic stent placement. Would like to discuss logistics with surgery at conference on Thursday. Possible procedure towards end of week.   - Continue CLD  - IVF D5+NS+KCl at 100 ml/hr  - Anti-emetics PRN  - Pain control with Hydromorphone PCA  - Defer antibiotics for now, but low threshold to start if she becomes febrile or decompensates clinically. Would likely cover with a combination of cefepime and metronidazole in this setting. Of note, did receive a dose of IV Cefepime/Flagyl for hypotension and concern for  infection on 1/22. With no leukocytosis and improvement in BP with IVF, antibiotics were d/c'd.   - Discussed possibility of initiating enteral feeding if patient unable to advance diet, will re-discuss 1/25 pending surgical decision by GI      #Pancreatic adenocarcinoma.  Recently diagnosed after she presented with abdominal pain in November 2017. She follows with Dr. Monk and was recently started on cycle 1 FOLFIRINOX on 1/15/2018. Her course has been complicated by walled-off pancreatic necrosis and infected necrotizing pancreatitis, for which she was hospitalized 12/9 to 12/13/2017 and underwent endoscopic necrosectomy. Imaging this admission most consistent with acute pancreatitis, with the previously demonstrated pancreatic tail mass appearing stable.  - Will add back pancreatic enzymes with advancement in diet.  - Left  for Toñito Handley to call patient per patient request    #Mild-Moderate Malnutrition.   Secondary to underlying malignancy and inability to tolerate PO intake from pancreatitis.   - Advancing to clear liquid diet  - Consider adding enteral nutrition pending decision from GI on timing of ERCP      FEN:   - CLD  - D5+NS+KCl at 100 ml/hr  - Replete lytes PRN    Prophylaxis:   - VTE: Hold Lovenox with possible ERCP tomorrow  - GI: Senna/Miralax prn    Code: FULL    Disposition: Anticipate discharge to home in the next several days pending clinical improvement and surgical intervention, possible by 1/26.      This plan of care has been discussed with the staff physician, Dr. Demond Potts PA-C  Hematology/Oncology  Pager: [phone]    Interval History   Reports feeling about the same today. Upset about the possibility of a GJ tube for feeding and feeling overwhelmed with this information. Will continue to take clear liquid diet. Trying to walk the halls frequently. The Dilaudid PCA is controlling her pain well. Spoke with GI team regarding endoscopic intervention  sometime this week likely. At this point will continue conservative management.     Physical Exam   Temp: 96.6  F (35.9  C) Temp src: Oral BP: 105/64 Pulse: 65 Heart Rate: 64 Resp: 16 SpO2: 96 % O2 Device: None (Room air)    Vitals:    01/20/18 1105 01/22/18 0956 01/24/18 0915   Weight: 73.9 kg (163 lb) 76.2 kg (168 lb 1.6 oz) 75.6 kg (166 lb 9.6 oz)     Vital Signs with Ranges  Temp:  [96.5  F (35.8  C)-99.1  F (37.3  C)] 96.6  F (35.9  C)  Pulse:  [65-75] 65  Heart Rate:  [64] 64  Resp:  [16] 16  BP: ()/(47-64) 105/64  SpO2:  [95 %-96 %] 96 %  I/O last 3 completed shifts:  In: 2660 [P.O.:240; I.V.:2420]  Out: -     Constitutional: Pleasant female seen laying in bed. No apparent distress, and appears stated age.  Eyes: Lids and lashes normal, sclera clear, conjunctiva normal.  ENT: Normocephalic, oral pharynx with moist mucus membranes, tonsils without erythema or exudates, gums normal and good dentition.   Respiratory: No increased work of breathing, good air exchange, clear to auscultation bilaterally, no crackles or wheezing.  Cardiovascular: Regular rate and rhythm, normal S1 and S2, and no murmur noted.  GI: No masses or scars. +BS. Soft. Tenderness on palpation to epigastric region.  Skin: No bruising or bleeding, no redness, warmth, or swelling, no rashes, no lesions, no jaundice.  Extremities: There is no redness, warmth, or swelling of the joints. No lower extremity edema. No cyanosis.  Neurologic: Awake, alert, oriented to name, place and time.    Vascular access: PIV, c/d/i    Medications     dextrose 5% and 0.9% NaCl with potassium chloride 20 mEq 100 mL/hr at 01/24/18 0507       amylase-lipase-protease  1-3 capsule Oral TID w/meals     enoxaparin  40 mg Subcutaneous Q24H     HYDROmorphone   Intravenous PCA     heparin lock flush  5-10 mL Intracatheter Q24H     heparin  5 mL Intracatheter Q28 Days       Data   ROUTINE IP LABS (Last four results)  BMP    Recent Labs  Lab 01/24/18  0800  01/23/18  0845 01/22/18  0948 01/21/18  0726    140 137 137   POTASSIUM 4.2 4.2 4.0 3.9   CHLORIDE 108 106 105 105   ILIANA 8.5 8.5 8.2* 8.1*   CO2 25 25 26 26   BUN 4* 5* 7 15   CR 0.56 0.60 0.68 0.68   * 105* 99 82     CBC    Recent Labs  Lab 01/24/18  0800 01/23/18  0845 01/22/18  0948 01/21/18  0726   WBC 5.2 7.4 7.5 6.4   RBC 3.30* 3.47* 3.58* 3.55*   HGB 9.6* 10.1* 10.6* 10.5*   HCT 31.2* 32.6* 33.6* 33.2*   MCV 95 94 94 94   MCH 29.1 29.1 29.6 29.6   MCHC 30.8* 31.0* 31.5 31.6   RDW 14.0 14.0 13.8 13.7    199 211 192     INR    Recent Labs  Lab 01/20/18  1230   INR 1.08

## 2018-01-24 NOTE — PLAN OF CARE
Problem: Patient Care Overview  Goal: Plan of Care/Patient Progress Review  Outcome: Improving  Pain: pt pain managed PCA 0.1   VSS  Output: Freely voiding   Diet: Clears  Activity: Up ad soni, pt walked hallway during shift   Bowel Function: bowel sounds heard in all four quadrants, passing flatus, no bm this shift   Lines: R port infusing, dressing CDI   Nausea: mild nausea, reports as intermittent, declines any interventions   Additional notes: Sputum culture requested, pt informed of request.   Plan: Continue to monitor pain, VS, bowel function, and output   Pt ok with bedside report

## 2018-01-24 NOTE — PLAN OF CARE
Problem: Patient Care Overview  Goal: Plan of Care/Patient Progress Review  Outcome: No Change  A&Ox4, VSS on RA. Up independently, ambulated halls x3 tonight. Pain well managed with dilaudid PCA in place (0.2 mg dose with 10 min lockout). Denies nausea, vomiting, & SOB. Right port infusing D5 NS with 20 KCl at 100 mL/hr. Tolerating clear liquid diet & PO fluids. Pt had broth & tea this evening with no increased pain, pt had more broth a few hours later & noted increased stabbing pain in abdomen, reminded to use PCA button & hot packs to relieve abdominal pain. Voiding without difficulty, not saving, no BM this shift, +gas. Plan for ERCP Thursday.  at bedside most of this evening. Resting comfortably in bed. Continue to monitor & follow POC.     Pt would not like bedside report.

## 2018-01-25 ENCOUNTER — SURGERY (OUTPATIENT)
Age: 58
End: 2018-01-25

## 2018-01-25 ENCOUNTER — APPOINTMENT (OUTPATIENT)
Dept: GENERAL RADIOLOGY | Facility: CLINIC | Age: 58
DRG: 435 | End: 2018-01-25
Attending: INTERNAL MEDICINE
Payer: COMMERCIAL

## 2018-01-25 ENCOUNTER — ANESTHESIA EVENT (OUTPATIENT)
Dept: SURGERY | Facility: CLINIC | Age: 58
DRG: 435 | End: 2018-01-25
Payer: COMMERCIAL

## 2018-01-25 ENCOUNTER — ANESTHESIA (OUTPATIENT)
Dept: SURGERY | Facility: CLINIC | Age: 58
DRG: 435 | End: 2018-01-25
Payer: COMMERCIAL

## 2018-01-25 LAB
ALBUMIN SERPL-MCNC: 2.5 G/DL (ref 3.4–5)
ALP SERPL-CCNC: 72 U/L (ref 40–150)
ALT SERPL W P-5'-P-CCNC: 33 U/L (ref 0–50)
ANION GAP SERPL CALCULATED.3IONS-SCNC: 8 MMOL/L (ref 3–14)
AST SERPL W P-5'-P-CCNC: 23 U/L (ref 0–45)
BASOPHILS # BLD AUTO: 0 10E9/L (ref 0–0.2)
BASOPHILS NFR BLD AUTO: 0.3 %
BILIRUB SERPL-MCNC: 0.5 MG/DL (ref 0.2–1.3)
BUN SERPL-MCNC: 2 MG/DL (ref 7–30)
CALCIUM SERPL-MCNC: 8.2 MG/DL (ref 8.5–10.1)
CHLORIDE SERPL-SCNC: 110 MMOL/L (ref 94–109)
CO2 SERPL-SCNC: 23 MMOL/L (ref 20–32)
CREAT SERPL-MCNC: 0.54 MG/DL (ref 0.52–1.04)
DIFFERENTIAL METHOD BLD: ABNORMAL
EOSINOPHIL # BLD AUTO: 0.1 10E9/L (ref 0–0.7)
EOSINOPHIL NFR BLD AUTO: 2.3 %
ERYTHROCYTE [DISTWIDTH] IN BLOOD BY AUTOMATED COUNT: 14.2 % (ref 10–15)
GFR SERPL CREATININE-BSD FRML MDRD: >90 ML/MIN/1.7M2
GLUCOSE SERPL-MCNC: 130 MG/DL (ref 70–99)
HCT VFR BLD AUTO: 30.6 % (ref 35–47)
HGB BLD-MCNC: 9.6 G/DL (ref 11.7–15.7)
IMM GRANULOCYTES # BLD: 0 10E9/L (ref 0–0.4)
IMM GRANULOCYTES NFR BLD: 0.3 %
LYMPHOCYTES # BLD AUTO: 1.5 10E9/L (ref 0.8–5.3)
LYMPHOCYTES NFR BLD AUTO: 36.6 %
MAGNESIUM SERPL-MCNC: 2.1 MG/DL (ref 1.6–2.3)
MCH RBC QN AUTO: 29.4 PG (ref 26.5–33)
MCHC RBC AUTO-ENTMCNC: 31.4 G/DL (ref 31.5–36.5)
MCV RBC AUTO: 94 FL (ref 78–100)
MONOCYTES # BLD AUTO: 0.5 10E9/L (ref 0–1.3)
MONOCYTES NFR BLD AUTO: 13.4 %
NEUTROPHILS # BLD AUTO: 1.9 10E9/L (ref 1.6–8.3)
NEUTROPHILS NFR BLD AUTO: 47.1 %
NRBC # BLD AUTO: 0 10*3/UL
NRBC BLD AUTO-RTO: 0 /100
PHOSPHATE SERPL-MCNC: 2.8 MG/DL (ref 2.5–4.5)
PLATELET # BLD AUTO: 204 10E9/L (ref 150–450)
POTASSIUM SERPL-SCNC: 3.9 MMOL/L (ref 3.4–5.3)
PROT SERPL-MCNC: 6.2 G/DL (ref 6.8–8.8)
RBC # BLD AUTO: 3.27 10E12/L (ref 3.8–5.2)
SODIUM SERPL-SCNC: 141 MMOL/L (ref 133–144)
WBC # BLD AUTO: 4 10E9/L (ref 4–11)

## 2018-01-25 PROCEDURE — 25000566 ZZH SEVOFLURANE, EA 15 MIN: Performed by: INTERNAL MEDICINE

## 2018-01-25 PROCEDURE — 99232 SBSQ HOSP IP/OBS MODERATE 35: CPT | Performed by: INTERNAL MEDICINE

## 2018-01-25 PROCEDURE — 25800025 ZZH RX 258: Performed by: PHYSICIAN ASSISTANT

## 2018-01-25 PROCEDURE — 25000128 H RX IP 250 OP 636: Performed by: NURSE ANESTHETIST, CERTIFIED REGISTERED

## 2018-01-25 PROCEDURE — 84100 ASSAY OF PHOSPHORUS: CPT | Performed by: PHYSICIAN ASSISTANT

## 2018-01-25 PROCEDURE — 36000072 ZZH SURGERY LEVEL 5 W FLUORO 1ST 30 MIN - UMMC: Performed by: INTERNAL MEDICINE

## 2018-01-25 PROCEDURE — 37000009 ZZH ANESTHESIA TECHNICAL FEE, EACH ADDTL 15 MIN: Performed by: INTERNAL MEDICINE

## 2018-01-25 PROCEDURE — 37000008 ZZH ANESTHESIA TECHNICAL FEE, 1ST 30 MIN: Performed by: INTERNAL MEDICINE

## 2018-01-25 PROCEDURE — 12000001 ZZH R&B MED SURG/OB UMMC

## 2018-01-25 PROCEDURE — C1726 CATH, BAL DIL, NON-VASCULAR: HCPCS | Performed by: INTERNAL MEDICINE

## 2018-01-25 PROCEDURE — C1877 STENT, NON-COAT/COV W/O DEL: HCPCS | Performed by: INTERNAL MEDICINE

## 2018-01-25 PROCEDURE — 83735 ASSAY OF MAGNESIUM: CPT | Performed by: PHYSICIAN ASSISTANT

## 2018-01-25 PROCEDURE — 36000070 ZZH SURGERY LEVEL 5 EA 15 ADDTL MIN - UMMC: Performed by: INTERNAL MEDICINE

## 2018-01-25 PROCEDURE — C1876 STENT, NON-COA/NON-COV W/DEL: HCPCS | Performed by: INTERNAL MEDICINE

## 2018-01-25 PROCEDURE — 27210794 ZZH OR GENERAL SUPPLY STERILE: Performed by: INTERNAL MEDICINE

## 2018-01-25 PROCEDURE — 40000170 ZZH STATISTIC PRE-PROCEDURE ASSESSMENT II: Performed by: INTERNAL MEDICINE

## 2018-01-25 PROCEDURE — 80053 COMPREHEN METABOLIC PANEL: CPT | Performed by: PHYSICIAN ASSISTANT

## 2018-01-25 PROCEDURE — 36592 COLLECT BLOOD FROM PICC: CPT | Performed by: PHYSICIAN ASSISTANT

## 2018-01-25 PROCEDURE — C9399 UNCLASSIFIED DRUGS OR BIOLOG: HCPCS | Performed by: NURSE ANESTHETIST, CERTIFIED REGISTERED

## 2018-01-25 PROCEDURE — 25000125 ZZHC RX 250: Performed by: INTERNAL MEDICINE

## 2018-01-25 PROCEDURE — 40000279 XR SURGERY CARM FLUORO GREATER THAN 5 MIN W STILLS

## 2018-01-25 PROCEDURE — 85025 COMPLETE CBC W/AUTO DIFF WBC: CPT | Performed by: PHYSICIAN ASSISTANT

## 2018-01-25 PROCEDURE — C1769 GUIDE WIRE: HCPCS | Performed by: INTERNAL MEDICINE

## 2018-01-25 PROCEDURE — 25500064 ZZH RX 255 OP 636: Performed by: INTERNAL MEDICINE

## 2018-01-25 PROCEDURE — 0F7D8DZ DILATION OF PANCREATIC DUCT WITH INTRALUMINAL DEVICE, VIA NATURAL OR ARTIFICIAL OPENING ENDOSCOPIC: ICD-10-PCS | Performed by: INTERNAL MEDICINE

## 2018-01-25 PROCEDURE — 71000014 ZZH RECOVERY PHASE 1 LEVEL 2 FIRST HR: Performed by: INTERNAL MEDICINE

## 2018-01-25 PROCEDURE — 0F9G3ZZ DRAINAGE OF PANCREAS, PERCUTANEOUS APPROACH: ICD-10-PCS | Performed by: INTERNAL MEDICINE

## 2018-01-25 PROCEDURE — 25000125 ZZHC RX 250: Performed by: ANESTHESIOLOGY

## 2018-01-25 DEVICE — STENT GEENEN PANCREA 5FRX3CM G49663 GPSO-SF-5-3: Type: IMPLANTABLE DEVICE | Site: PANCREATIC DUCT | Status: FUNCTIONAL

## 2018-01-25 DEVICE — STENT SOLUS BILIARY 10FRX01CM DBL PIGTAIL W/INTRO G26829: Type: IMPLANTABLE DEVICE | Site: PANCREATIC DUCT | Status: FUNCTIONAL

## 2018-01-25 RX ORDER — SCOLOPAMINE TRANSDERMAL SYSTEM 1 MG/1
1 PATCH, EXTENDED RELEASE TRANSDERMAL ONCE
Status: COMPLETED | OUTPATIENT
Start: 2018-01-25 | End: 2018-01-25

## 2018-01-25 RX ORDER — HYDRALAZINE HYDROCHLORIDE 20 MG/ML
2.5-5 INJECTION INTRAMUSCULAR; INTRAVENOUS EVERY 10 MIN PRN
Status: DISCONTINUED | OUTPATIENT
Start: 2018-01-25 | End: 2018-01-25 | Stop reason: HOSPADM

## 2018-01-25 RX ORDER — ONDANSETRON 2 MG/ML
INJECTION INTRAMUSCULAR; INTRAVENOUS PRN
Status: DISCONTINUED | OUTPATIENT
Start: 2018-01-25 | End: 2018-01-25

## 2018-01-25 RX ORDER — NALOXONE HYDROCHLORIDE 0.4 MG/ML
.1-.4 INJECTION, SOLUTION INTRAMUSCULAR; INTRAVENOUS; SUBCUTANEOUS
Status: DISCONTINUED | OUTPATIENT
Start: 2018-01-25 | End: 2018-01-26

## 2018-01-25 RX ORDER — PROPOFOL 10 MG/ML
INJECTION, EMULSION INTRAVENOUS PRN
Status: DISCONTINUED | OUTPATIENT
Start: 2018-01-25 | End: 2018-01-25

## 2018-01-25 RX ORDER — FLUMAZENIL 0.1 MG/ML
0.2 INJECTION, SOLUTION INTRAVENOUS
Status: ACTIVE | OUTPATIENT
Start: 2018-01-25 | End: 2018-01-26

## 2018-01-25 RX ORDER — FENTANYL CITRATE 50 UG/ML
INJECTION, SOLUTION INTRAMUSCULAR; INTRAVENOUS PRN
Status: DISCONTINUED | OUTPATIENT
Start: 2018-01-25 | End: 2018-01-25

## 2018-01-25 RX ORDER — INDOMETHACIN 50 MG/1
SUPPOSITORY RECTAL PRN
Status: DISCONTINUED | OUTPATIENT
Start: 2018-01-25 | End: 2018-01-25 | Stop reason: HOSPADM

## 2018-01-25 RX ORDER — LABETALOL HYDROCHLORIDE 5 MG/ML
10 INJECTION, SOLUTION INTRAVENOUS
Status: DISCONTINUED | OUTPATIENT
Start: 2018-01-25 | End: 2018-01-25 | Stop reason: HOSPADM

## 2018-01-25 RX ORDER — SODIUM CHLORIDE, SODIUM LACTATE, POTASSIUM CHLORIDE, CALCIUM CHLORIDE 600; 310; 30; 20 MG/100ML; MG/100ML; MG/100ML; MG/100ML
INJECTION, SOLUTION INTRAVENOUS CONTINUOUS
Status: DISCONTINUED | OUTPATIENT
Start: 2018-01-25 | End: 2018-01-25 | Stop reason: HOSPADM

## 2018-01-25 RX ORDER — ONDANSETRON 2 MG/ML
4 INJECTION INTRAMUSCULAR; INTRAVENOUS EVERY 30 MIN PRN
Status: DISCONTINUED | OUTPATIENT
Start: 2018-01-25 | End: 2018-01-25 | Stop reason: HOSPADM

## 2018-01-25 RX ORDER — FENTANYL CITRATE 50 UG/ML
25-50 INJECTION, SOLUTION INTRAMUSCULAR; INTRAVENOUS
Status: DISCONTINUED | OUTPATIENT
Start: 2018-01-25 | End: 2018-01-25 | Stop reason: HOSPADM

## 2018-01-25 RX ORDER — SODIUM CHLORIDE, SODIUM LACTATE, POTASSIUM CHLORIDE, CALCIUM CHLORIDE 600; 310; 30; 20 MG/100ML; MG/100ML; MG/100ML; MG/100ML
INJECTION, SOLUTION INTRAVENOUS CONTINUOUS PRN
Status: DISCONTINUED | OUTPATIENT
Start: 2018-01-25 | End: 2018-01-25

## 2018-01-25 RX ORDER — LEVOFLOXACIN 5 MG/ML
INJECTION, SOLUTION INTRAVENOUS PRN
Status: DISCONTINUED | OUTPATIENT
Start: 2018-01-25 | End: 2018-01-25

## 2018-01-25 RX ORDER — DEXAMETHASONE SODIUM PHOSPHATE 4 MG/ML
INJECTION, SOLUTION INTRA-ARTICULAR; INTRALESIONAL; INTRAMUSCULAR; INTRAVENOUS; SOFT TISSUE PRN
Status: DISCONTINUED | OUTPATIENT
Start: 2018-01-25 | End: 2018-01-25

## 2018-01-25 RX ORDER — ONDANSETRON 4 MG/1
4 TABLET, ORALLY DISINTEGRATING ORAL EVERY 30 MIN PRN
Status: DISCONTINUED | OUTPATIENT
Start: 2018-01-25 | End: 2018-01-25 | Stop reason: HOSPADM

## 2018-01-25 RX ADMIN — DEXAMETHASONE SODIUM PHOSPHATE 6 MG: 4 INJECTION, SOLUTION INTRA-ARTICULAR; INTRALESIONAL; INTRAMUSCULAR; INTRAVENOUS; SOFT TISSUE at 11:14

## 2018-01-25 RX ADMIN — PHENYLEPHRINE HYDROCHLORIDE 100 MCG: 10 INJECTION, SOLUTION INTRAMUSCULAR; INTRAVENOUS; SUBCUTANEOUS at 11:56

## 2018-01-25 RX ADMIN — ONDANSETRON 4 MG: 2 INJECTION INTRAMUSCULAR; INTRAVENOUS at 13:38

## 2018-01-25 RX ADMIN — SUGAMMADEX 150 MG: 100 INJECTION, SOLUTION INTRAVENOUS at 13:40

## 2018-01-25 RX ADMIN — LEVOFLOXACIN 500 MG: 5 INJECTION, SOLUTION INTRAVENOUS at 11:10

## 2018-01-25 RX ADMIN — POTASSIUM CHLORIDE, DEXTROSE MONOHYDRATE AND SODIUM CHLORIDE: 150; 5; 900 INJECTION, SOLUTION INTRAVENOUS at 01:50

## 2018-01-25 RX ADMIN — ROCURONIUM BROMIDE 20 MG: 10 INJECTION INTRAVENOUS at 13:14

## 2018-01-25 RX ADMIN — SCOPALAMINE 1 PATCH: 1 PATCH, EXTENDED RELEASE TRANSDERMAL at 10:18

## 2018-01-25 RX ADMIN — ROCURONIUM BROMIDE 50 MG: 10 INJECTION INTRAVENOUS at 10:53

## 2018-01-25 RX ADMIN — INDOMETHACIN 50 MG: 50 SUPPOSITORY RECTAL at 11:44

## 2018-01-25 RX ADMIN — MIDAZOLAM 2 MG: 1 INJECTION INTRAMUSCULAR; INTRAVENOUS at 10:38

## 2018-01-25 RX ADMIN — POTASSIUM CHLORIDE, DEXTROSE MONOHYDRATE AND SODIUM CHLORIDE: 150; 5; 900 INJECTION, SOLUTION INTRAVENOUS at 14:15

## 2018-01-25 RX ADMIN — PHENYLEPHRINE HYDROCHLORIDE 100 MCG: 10 INJECTION, SOLUTION INTRAMUSCULAR; INTRAVENOUS; SUBCUTANEOUS at 12:06

## 2018-01-25 RX ADMIN — ROCURONIUM BROMIDE 10 MG: 10 INJECTION INTRAVENOUS at 11:51

## 2018-01-25 RX ADMIN — ROCURONIUM BROMIDE 10 MG: 10 INJECTION INTRAVENOUS at 12:31

## 2018-01-25 RX ADMIN — IOTHALAMATE MEGLUMINE 40 ML: 600 INJECTION INTRAVASCULAR at 13:20

## 2018-01-25 RX ADMIN — PROPOFOL 140 MG: 10 INJECTION, EMULSION INTRAVENOUS at 10:53

## 2018-01-25 RX ADMIN — FENTANYL CITRATE 100 MCG: 50 INJECTION, SOLUTION INTRAMUSCULAR; INTRAVENOUS at 10:53

## 2018-01-25 RX ADMIN — SODIUM CHLORIDE, POTASSIUM CHLORIDE, SODIUM LACTATE AND CALCIUM CHLORIDE: 600; 310; 30; 20 INJECTION, SOLUTION INTRAVENOUS at 10:37

## 2018-01-25 RX ADMIN — PHENYLEPHRINE HYDROCHLORIDE 100 MCG: 10 INJECTION, SOLUTION INTRAMUSCULAR; INTRAVENOUS; SUBCUTANEOUS at 12:28

## 2018-01-25 RX ADMIN — PHENYLEPHRINE HYDROCHLORIDE 100 MCG: 10 INJECTION, SOLUTION INTRAMUSCULAR; INTRAVENOUS; SUBCUTANEOUS at 11:09

## 2018-01-25 RX ADMIN — PHENYLEPHRINE HYDROCHLORIDE 100 MCG: 10 INJECTION, SOLUTION INTRAMUSCULAR; INTRAVENOUS; SUBCUTANEOUS at 11:49

## 2018-01-25 RX ADMIN — PROPOFOL 60 MG: 10 INJECTION, EMULSION INTRAVENOUS at 12:31

## 2018-01-25 NOTE — PLAN OF CARE
"Problem: Patient Care Overview  Goal: Plan of Care/Patient Progress Review  Outcome: Therapy, progress toward functional goals is gradual  AVSS. Pain managed with PCA dilaudid. Denies nausea. Port infusing PCA and IVMF. Pt up walking frequently throughout the night. Tolerating clears. Passing gas, last BM was last evening. Voiding not saving. Plan is for possible ERCP and stent placement today or tomorrow pending MD's decision.     Pt was stating frustrations to bedside RN last night. Pt feels like there is a \"major disconnect\" in communication between teams. Pt does not agree with the option for a feeding tube because \"the only thing that is bothering me is this pain and I just want this taken care of.\" She stated that she feels like she is just \"sitting here waiting for a decision\" and she stated that she cannot afford to just sit in the hospital when she can go home and do all of this herself. Pt stated that her pancreatic MD was not even aware she was in the hospital. She is upset that none of the teams are communicating and she would rather go home instead of paying for a hospital bed just to wait for a decision to be made. She feels like the feeding tube is invasive and \"just buying time for someone to make a decision.\" RN will round with the team in the morning to advocate for the patient. Patient would also like bedside report at shift change.     Pt also has chemo scheduled for Monday and a mammogram scheduled for Thursday and was curious about doing them inpatient or rescheduling.       "

## 2018-01-25 NOTE — PLAN OF CARE
Problem: Patient Care Overview  Goal: Plan of Care/Patient Progress Review  Outcome: No Change  VSS. Pain controlled on Dilaudid PCA. +BS, +flatus, +BM. Voiding not saving. Has intermittent nausea but self soothes. Tolerating clear liquid diet. Up ad soni. Pt walked multiple times tonight with spouse. Preop scrub #1 performed incase of possible ERCP tomorrow. Plan: Continue to monitor. Pt would not like bedside report.

## 2018-01-25 NOTE — ANESTHESIA CARE TRANSFER NOTE
Patient: Kitty Bangura    Procedure(s):  Endoscopic retrograde cholangiopancreatogram and cyst drainage bile  - Wound Class: II-Clean Contaminated   - Wound Class: II-Clean Contaminated    Diagnosis: Necrotitis  Diagnosis Additional Information: No value filed.    Anesthesia Type:   General, ETT     Note:  Airway :Face Mask  Patient transferred to:PACU  Comments: VSS. Breathing spont. Report to RN at bedside.Handoff Report: Identifed the Patient, Identified the Reponsible Provider, Reviewed the pertinent medical history, Discussed the surgical course, Reviewed Intra-OP anesthesia mangement and issues during anesthesia, Set expectations for post-procedure period and Allowed opportunity for questions and acknowledgement of understanding      Vitals: (Last set prior to Anesthesia Care Transfer)    CRNA VITALS  1/25/2018 1318 - 1/25/2018 1353      1/25/2018             Resp Rate (observed): (!)  2                Electronically Signed By: TYLER Santiago CRNA  January 25, 2018  1:53 PM

## 2018-01-25 NOTE — BRIEF OP NOTE
[facility] Brief Operative Note   Pre-operative diagnosis:  Pancreatic fluid collection   Post-operative diagnosis:  Same, completely obstructed pancreatic duct in head   Procedure:  ERCP w pancreatic stent    Surgeon:  Dr. Vito Sanches    Assistant(s):  None   Anesthesia:  General, indomethacin, levaquin 500   Estimated blood loss:  None    Total IV fluids:  (See anesthesia record)    Blood transfusion:  No transfusion was given during surgery    Total urine output:  (See anesthesia record)    Drains:  None    Specimens:  None   Implants:  5F 3cm Sofflex pancreatic stent   Findings:  Normal CBD  Pancreatic duct totally obstructed just above junction of [place] and [place]. Forceably passed 018 then 021 wire but would not pass beyond end of duct, rather looped around into [place] duct. 5F 3cm flanged stent placed.     Obtained consent for EUS guided cystgastrostomy per Dr Metz.    Complications:  None   Condition:  Stable    Recommendations     Vito Sanches  [phone] See EUS report Dr Metz (cystgastrostomy)  Watch for any acute pancreatitis from transpapillary instrumentation  Xray in 3-4 weeks to ensure pancreatic stent passage.

## 2018-01-25 NOTE — ANESTHESIA POSTPROCEDURE EVALUATION
Patient: Kitty Bangura    Procedure(s):  Endoscopic retrograde cholangiopancreatogram with stent placement and cyst drainage bile  - Wound Class: II-Clean Contaminated  Endoscopic Ultrasound with guided Cyst-Gastrostomy - Wound Class: II-Clean Contaminated    Diagnosis:Necrotitis  Diagnosis Additional Information: No value filed.    Anesthesia Type:  General, ETT    Note:  Anesthesia Post Evaluation    Patient location during evaluation: PACU  Patient participation: Able to fully participate in evaluation  Level of consciousness: awake and alert  Pain management: adequate  Airway patency: patent  Cardiovascular status: acceptable  Respiratory status: acceptable  Hydration status: acceptable  PONV: none     Anesthetic complications: None          Last vitals:  Vitals:    01/25/18 1400 01/25/18 1415 01/25/18 1430   BP:  116/77 121/85   Pulse:      Resp: 14 14 14   Temp:  36.4  C (97.6  F) 36.4  C (97.6  F)   SpO2: 100% 100% 99%         Electronically Signed By: Franci Jarvis MD  January 25, 2018  2:51 PM

## 2018-01-25 NOTE — PROGRESS NOTES
[facility], [place]    Hematology / Oncology Progress Note    Date of Service (when I saw the patient): 01/25/2018     Assessment & Plan   Kitty Bangura is a 57 year old female with recently diagnosed pancreatic adenocarcinoma, started on cycle 1 FOLFIRINOX on 1/15/2018. Her course has been complicated by walled-off pancreatic necrosis and infected necrotizing pancreatitis for which she was hospitalized in December (requiring endoscopic necrosectomy). She presented to the ED for evaluation of a 3 day history of worsening abdominal pain, nausea and vomiting, and was found to have imaging findings consistent with acute pancreatitis.      #Abdominal pain, nausea and vomiting   #Acute pancreatitis.  Patient reports a 3 day history of worsening abdominal pain, now associated with nausea and vomiting over the last day or 2 and has been unable to eat or drink anything since yesterday. Labs and vitals are reassuring - no leukocytosis, lactate is not elevated, she is afebrile and hemodynamically stable. Lipase is not elevated. CT abd/pelvis this admission shows findings consistent with acute pancreatitis with increased peripancreatic inflammation and new peripancreatic fluid collections. There is no definite pancreatic necrosis, but these collections may represent peripancreatic acute necrotic collections or pseudocysts (per radiology).  - GI consulted. Taken to OR 1/25. Dr. Sanches attempted ERCP with pancreatic stent placement, but unfortunately pancreatic duct was completely obstructed and a wire was unable to pass beyond duct. A stent was placed and an X-ray should be done in 3-4 weeks to ensure stent passed. Patient was then consented for EUS guided cystgastrostomy per Dr Metz (currently in OR).   - IVF D5+NS+KCl at 100 ml/hr  - Anti-emetics PRN  - Pain control with Hydromorphone PCA  - Defer antibiotics for now, but low threshold to start if she becomes febrile or decompensates  clinically. Would likely cover with a combination of cefepime and metronidazole in this setting. Of note, did receive a dose of IV Cefepime/Flagyl for hypotension and concern for infection on 1/22. With no leukocytosis and improvement in BP with IVF, antibiotics were d/c'd.   - Discussed possibility of initiating enteral feeding if patient unable to advance diet.       #Pancreatic adenocarcinoma.  Recently diagnosed after she presented with abdominal pain in November 2017. She follows with Dr. Monk and was recently started on cycle 1 FOLFIRINOX on 1/15/2018. Her course has been complicated by walled-off pancreatic necrosis and infected necrotizing pancreatitis, for which she was hospitalized 12/9 to 12/13/2017 and underwent endoscopic necrosectomy. Imaging this admission most consistent with acute pancreatitis, with the previously demonstrated pancreatic tail mass appearing stable.  - Pancreatic enzymes pending advancement in diet.    #Mild-Moderate Malnutrition.   Secondary to underlying malignancy and inability to tolerate PO intake from pancreatitis.   - Advancing diet as tolerated after EUS  - Consider adding enteral nutrition pending toleration of diet advancement      FEN:   - Currently NPO and will advance diet as tolerated after GI intervention   - D5+NS+KCl at 100 ml/hr  - Replete lytes PRN    Prophylaxis:   - VTE: Hold Lovenox  - GI: Senna/Miralax prn    Code: FULL    Disposition: Anticipate discharge to home in the next several days pending advancement in diet and transition to PO pain control, possible by 1/27.      This plan of care has been discussed with the staff physician, Dr. Demond Potts PA-C  Hematology/Oncology  Pager: [phone]    Interval History   Very happy that procedure will be attempted today. Tolerated Ensure clear and broth well yesterday. Pain is well controlled this morning. Had a BM yesterday. Discussed remaining NPO and her diet will be advanced after her  procedure.     Physical Exam   Temp: 98.2  F (36.8  C) Temp src: Oral BP: 114/76 Pulse: 65 Heart Rate: 53 Resp: 16 SpO2: 97 % O2 Device: None (Room air)    Vitals:    01/20/18 1105 01/22/18 0956 01/24/18 0915   Weight: 73.9 kg (163 lb) 76.2 kg (168 lb 1.6 oz) 75.6 kg (166 lb 9.6 oz)     Vital Signs with Ranges  Temp:  [97.2  F (36.2  C)-98.2  F (36.8  C)] 98.2  F (36.8  C)  Pulse:  [65] 65  Heart Rate:  [53-66] 53  Resp:  [16-18] 16  BP: ()/(59-76) 114/76  SpO2:  [96 %-98 %] 97 %  I/O last 3 completed shifts:  In: 3797 [P.O.:1377; I.V.:2420]  Out: -     Constitutional: Pleasant female seen laying in bed. No apparent distress, and appears stated age.  Eyes: Lids and lashes normal, sclera clear, conjunctiva normal.  ENT: Normocephalic, oral pharynx with moist mucus membranes, tonsils without erythema or exudates, gums normal and good dentition.   Respiratory: No increased work of breathing, good air exchange, clear to auscultation bilaterally, no crackles or wheezing.  Cardiovascular: Regular rate and rhythm, normal S1 and S2, and no murmur noted.  GI: No masses or scars. +BS. Soft. Tenderness on palpation to epigastric region.  Skin: No bruising or bleeding, no redness, warmth, or swelling, no rashes, no lesions, no jaundice.  Extremities: There is no redness, warmth, or swelling of the joints. No lower extremity edema. No cyanosis.  Neurologic: Awake, alert, oriented to name, place and time.    Vascular access: PIV, c/d/i    Medications     dextrose 5% and 0.9% NaCl with potassium chloride 20 mEq 10 mL/hr at 01/25/18 1237       [Auto Hold] amylase-lipase-protease  1-3 capsule Oral TID w/meals     [Auto Hold] HYDROmorphone   Intravenous PCA     [Auto Hold] heparin lock flush  5-10 mL Intracatheter Q24H     [Auto Hold] heparin  5 mL Intracatheter Q28 Days       Data   ROUTINE IP LABS (Last four results)  BMP    Recent Labs  Lab 01/25/18  0735 01/24/18  0800 01/23/18  0845 01/22/18  0948    141 140 137    POTASSIUM 3.9 4.2 4.2 4.0   CHLORIDE 110* 108 106 105   ILIANA 8.2* 8.5 8.5 8.2*   CO2 23 25 25 26   BUN 2* 4* 5* 7   CR 0.54 0.56 0.60 0.68   * 113* 105* 99     CBC    Recent Labs  Lab 01/25/18  0735 01/24/18  0800 01/23/18  0845 01/22/18  0948   WBC 4.0 5.2 7.4 7.5   RBC 3.27* 3.30* 3.47* 3.58*   HGB 9.6* 9.6* 10.1* 10.6*   HCT 30.6* 31.2* 32.6* 33.6*   MCV 94 95 94 94   MCH 29.4 29.1 29.1 29.6   MCHC 31.4* 30.8* 31.0* 31.5   RDW 14.2 14.0 14.0 13.8    217 199 211     INR    Recent Labs  Lab 01/20/18  1230   INR 1.08

## 2018-01-25 NOTE — PLAN OF CARE
Problem: Patient Care Overview  Goal: Plan of Care/Patient Progress Review  Outcome: Therapy, progress towards functional goals is fair  Up with SBA of 1. Pt transported to  this am around 1000H, report given to BRENDAN Hutchison for ERCP via stretcher in good condition. Around 1500H, pt arrived from PACU via stretcher, Hand off report received from BRENDAN Garza. Pt voided upon arrival from PACU with 700cc. Vitals signs taken and set up for protocol 1. Alert, slightly dizzy. On PCA Dilaudid 0.2 mg every 10 minutes. Spouse at bedside. PLAN: To continue with the care, post op cares. Orals care provided. Pt would like to have bedside report.    Text paged: #[phone] re: oxycodone order to dc. Pt is on PCA. Waiting for reply/call back.

## 2018-01-25 NOTE — BRIEF OP NOTE
[facility] [place]    Brief Operative Note    Pre-operative diagnosis: Necrotitis  Post-operative diagnosis *Pancreatic duct leak with pseudocyst*  Procedure: Procedure(s):  Endoscopic retrograde cholangiopancreatogram with stent placement and cyst drainage bile  - Wound Class: II-Clean Contaminated  Endoscopic Ultrasound with guided Cyst-Gastrostomy - Wound Class: II-Clean Contaminated  Surgeon: Surgeon(s) and Role:  Panel 1:     * Vito Sanches MD - Primary    Panel 2:     * González Metz MD - Primary  Anesthesia: General   Estimated blood loss: None  Drains: None  Specimens: * No specimens in log *  Findings: 68 x 24 mm fluid collection consistent with pseudocyst seen posterior to the gastric body and medial to the duodenum. No solid components.  Changes of chronic pancreatitis in the body and tail upstream, with previously biopsied body mass measuring 33 x 28 mm in diameter which appears encapsulated.  Pancreatic duct measures 4.4 mm in diameter upstream from the pseudocyst and clearly drains into the collection.    Elected to drain across the stomach rather than the duodenum as this location is less vascular, likely adherent and will likely allow better more reliable drainage of the disrupted pancreatic duct.   The site was punctured with a 22 ga EchoTip needle and a 0.018 in Roadrunner wire coiled in the cavity. The wall was then dilated using a 3.5 mm Eliud balloon (overinflated to 3.8 mm). Following this, however neither the Solus delivery system, a 6 mm Elation nor a 6 mm Hurricane balloon would advance across the gastric wall. The wall was re-dilated with the Eliud balloon and then the Hurricane balloon readily passed. The gastrotomy was then dilated to 6 mm and a single 10F x 1 cm double pigtail Solus stent placed across the defect.  Complications: None.  Implants: 10F x 1 cm transgastric Solus stent.    WINSTON Metz MD   of  Medicine  Division of Gastroenterology, Hepatology and Nutrition  [facility]

## 2018-01-25 NOTE — ANESTHESIA PREPROCEDURE EVALUATION
Anesthesia Evaluation     . Pt has had prior anesthetic. Type: General    No history of anesthetic complications          ROS/MED HX    ENT/Pulmonary:  - neg pulmonary ROS     Neurologic:  - neg neurologic ROS     Cardiovascular:  - neg cardiovascular ROS       METS/Exercise Tolerance:  >4 METS   Hematologic:         Musculoskeletal:  - neg musculoskeletal ROS       GI/Hepatic: Comment: Necrotizing pancreatitis  Found to have pancreatic adenocarcinoma        Renal/Genitourinary:  - ROS Renal section negative       Endo:  - neg endo ROS       Psychiatric:         Infectious Disease:         Malignancy:         Other:                     Physical Exam  Normal systems: cardiovascular and pulmonary    Airway   Mallampati: I  TM distance: >3 FB  Neck ROM: full    Dental   (+) missing    Cardiovascular   Rhythm and rate: regular and normal      Pulmonary    breath sounds clear to auscultation                    Anesthesia Plan      History & Physical Review  History and physical reviewed and following examination; no interval change.    ASA Status:  2 .    NPO Status:  > 8 hours    Plan for General and ETT with Intravenous induction. Maintenance will be Balanced.    PONV prophylaxis:  Ondansetron (or other 5HT-3), Dexamethasone or Solumedrol and Scopolamine patch  Additional equipment: 2nd IV      Postoperative Care  Postoperative pain management:  IV analgesics.      Consents  Anesthetic plan, risks, benefits and alternatives discussed with:  Patient..                          .

## 2018-01-26 LAB
ALBUMIN SERPL-MCNC: 2.8 G/DL (ref 3.4–5)
ALP SERPL-CCNC: 178 U/L (ref 40–150)
ALT SERPL W P-5'-P-CCNC: 214 U/L (ref 0–50)
ANION GAP SERPL CALCULATED.3IONS-SCNC: 8 MMOL/L (ref 3–14)
AST SERPL W P-5'-P-CCNC: 234 U/L (ref 0–45)
BASOPHILS # BLD AUTO: 0 10E9/L (ref 0–0.2)
BASOPHILS NFR BLD AUTO: 0 %
BILIRUB SERPL-MCNC: 0.7 MG/DL (ref 0.2–1.3)
BUN SERPL-MCNC: 5 MG/DL (ref 7–30)
CALCIUM SERPL-MCNC: 8.7 MG/DL (ref 8.5–10.1)
CHLORIDE SERPL-SCNC: 112 MMOL/L (ref 94–109)
CO2 SERPL-SCNC: 23 MMOL/L (ref 20–32)
CREAT SERPL-MCNC: 0.58 MG/DL (ref 0.52–1.04)
DIFFERENTIAL METHOD BLD: ABNORMAL
EOSINOPHIL # BLD AUTO: 0 10E9/L (ref 0–0.7)
EOSINOPHIL NFR BLD AUTO: 0 %
ERYTHROCYTE [DISTWIDTH] IN BLOOD BY AUTOMATED COUNT: 14 % (ref 10–15)
GFR SERPL CREATININE-BSD FRML MDRD: >90 ML/MIN/1.7M2
GLUCOSE SERPL-MCNC: 135 MG/DL (ref 70–99)
HCT VFR BLD AUTO: 32 % (ref 35–47)
HGB BLD-MCNC: 10.1 G/DL (ref 11.7–15.7)
IMM GRANULOCYTES # BLD: 0 10E9/L (ref 0–0.4)
IMM GRANULOCYTES NFR BLD: 0.2 %
LYMPHOCYTES # BLD AUTO: 1.6 10E9/L (ref 0.8–5.3)
LYMPHOCYTES NFR BLD AUTO: 24 %
MAGNESIUM SERPL-MCNC: 2.1 MG/DL (ref 1.6–2.3)
MCH RBC QN AUTO: 29.3 PG (ref 26.5–33)
MCHC RBC AUTO-ENTMCNC: 31.6 G/DL (ref 31.5–36.5)
MCV RBC AUTO: 93 FL (ref 78–100)
MONOCYTES # BLD AUTO: 0.6 10E9/L (ref 0–1.3)
MONOCYTES NFR BLD AUTO: 8.5 %
NEUTROPHILS # BLD AUTO: 4.4 10E9/L (ref 1.6–8.3)
NEUTROPHILS NFR BLD AUTO: 67.3 %
NRBC # BLD AUTO: 0 10*3/UL
NRBC BLD AUTO-RTO: 0 /100
PHOSPHATE SERPL-MCNC: 2.5 MG/DL (ref 2.5–4.5)
PLATELET # BLD AUTO: 251 10E9/L (ref 150–450)
POTASSIUM SERPL-SCNC: 4.2 MMOL/L (ref 3.4–5.3)
PROT SERPL-MCNC: 6.9 G/DL (ref 6.8–8.8)
RBC # BLD AUTO: 3.45 10E12/L (ref 3.8–5.2)
SODIUM SERPL-SCNC: 142 MMOL/L (ref 133–144)
UPPER EUS: NORMAL
WBC # BLD AUTO: 6.6 10E9/L (ref 4–11)

## 2018-01-26 PROCEDURE — 83735 ASSAY OF MAGNESIUM: CPT | Performed by: PHYSICIAN ASSISTANT

## 2018-01-26 PROCEDURE — 80053 COMPREHEN METABOLIC PANEL: CPT | Performed by: PHYSICIAN ASSISTANT

## 2018-01-26 PROCEDURE — 36592 COLLECT BLOOD FROM PICC: CPT | Performed by: PHYSICIAN ASSISTANT

## 2018-01-26 PROCEDURE — 25000128 H RX IP 250 OP 636: Performed by: INTERNAL MEDICINE

## 2018-01-26 PROCEDURE — 12000001 ZZH R&B MED SURG/OB UMMC

## 2018-01-26 PROCEDURE — 84100 ASSAY OF PHOSPHORUS: CPT | Performed by: PHYSICIAN ASSISTANT

## 2018-01-26 PROCEDURE — 85025 COMPLETE CBC W/AUTO DIFF WBC: CPT | Performed by: PHYSICIAN ASSISTANT

## 2018-01-26 PROCEDURE — 25800025 ZZH RX 258: Performed by: PHYSICIAN ASSISTANT

## 2018-01-26 PROCEDURE — 99232 SBSQ HOSP IP/OBS MODERATE 35: CPT | Performed by: INTERNAL MEDICINE

## 2018-01-26 PROCEDURE — 25000132 ZZH RX MED GY IP 250 OP 250 PS 637: Performed by: PHYSICIAN ASSISTANT

## 2018-01-26 RX ORDER — OXYCODONE HYDROCHLORIDE 5 MG/1
5 TABLET ORAL EVERY 4 HOURS PRN
Status: DISCONTINUED | OUTPATIENT
Start: 2018-01-26 | End: 2018-01-27 | Stop reason: HOSPADM

## 2018-01-26 RX ORDER — TRAZODONE HYDROCHLORIDE 50 MG/1
50 TABLET, FILM COATED ORAL AT BEDTIME
Status: DISCONTINUED | OUTPATIENT
Start: 2018-01-26 | End: 2018-01-27 | Stop reason: HOSPADM

## 2018-01-26 RX ADMIN — POTASSIUM CHLORIDE, DEXTROSE MONOHYDRATE AND SODIUM CHLORIDE: 150; 5; 900 INJECTION, SOLUTION INTRAVENOUS at 00:48

## 2018-01-26 RX ADMIN — TRAZODONE HYDROCHLORIDE 50 MG: 50 TABLET ORAL at 21:10

## 2018-01-26 RX ADMIN — SODIUM CHLORIDE 500 ML: 9 INJECTION, SOLUTION INTRAVENOUS at 22:47

## 2018-01-26 RX ADMIN — PANCRELIPASE 24000 UNITS: 24000; 76000; 120000 CAPSULE, DELAYED RELEASE PELLETS ORAL at 14:28

## 2018-01-26 RX ADMIN — PANCRELIPASE 24000 UNITS: 24000; 76000; 120000 CAPSULE, DELAYED RELEASE PELLETS ORAL at 09:03

## 2018-01-26 RX ADMIN — PANCRELIPASE 48000 UNITS: 24000; 76000; 120000 CAPSULE, DELAYED RELEASE PELLETS ORAL at 17:43

## 2018-01-26 ASSESSMENT — PAIN DESCRIPTION - DESCRIPTORS
DESCRIPTORS: DISCOMFORT
DESCRIPTORS: DISCOMFORT

## 2018-01-26 NOTE — PROGRESS NOTES
[facility], [place]    Hematology / Oncology Progress Note    Date of Service (when I saw the patient): 01/26/2018     Assessment & Plan   Kitty Bangura is a 57 year old female with recently diagnosed pancreatic adenocarcinoma, started on cycle 1 FOLFIRINOX on 1/15/2018. Her course has been complicated by walled-off pancreatic necrosis and infected necrotizing pancreatitis for which she was hospitalized in December (requiring endoscopic necrosectomy). She presented to the ED for evaluation of a 3 day history of worsening abdominal pain, nausea and vomiting, and was found to have imaging findings consistent with acute pancreatitis.      #Abdominal pain, nausea and vomiting   #Acute pancreatitis.  Patient reports a 3 day history of worsening abdominal pain, now associated with nausea and vomiting over the last day or 2 and has been unable to eat or drink anything since yesterday. Labs and vitals are reassuring - no leukocytosis, lactate is not elevated, she is afebrile and hemodynamically stable. Lipase is not elevated. CT abd/pelvis this admission shows findings consistent with acute pancreatitis with increased peripancreatic inflammation and new peripancreatic fluid collections. There is no definite pancreatic necrosis, but these collections may represent peripancreatic acute necrotic collections or pseudocysts (per radiology). POD #1 ERCP and cyst gastrostomy. Doing very well with minimal pain and tolerated CLD.   - GI consulted. Taken to OR 1/25. Dr. Sanches attempted ERCP with pancreatic stent placement, but unfortunately pancreatic duct was completely obstructed and a wire was unable to pass beyond duct. A pancreatic stent was placed and an X-ray should be done in 3-4 weeks to ensure stent passed. Dr. Mezt then performed an EUS guided cyst gastrostomy.   - Advance to FLD this evening.   - IVF D5+NS+KCl at 100 ml/hr  - Anti-emetics PRN  - Pain control with PO Oxycodone 5 mg  q4hrs prn.   - Defer antibiotics for now, but low threshold to start if she becomes febrile or decompensates clinically. Would likely cover with a combination of cefepime and metronidazole in this setting. Of note, did receive a dose of IV Cefepime/Flagyl for hypotension and concern for infection on 1/22. With no leukocytosis and improvement in BP with IVF, antibiotics were d/c'd.     #LFT elevation.   Suspect is inflammatory effect from ERCP. Asymptomatic.   - Monitor daily LFTs      #Pancreatic adenocarcinoma.  Recently diagnosed after she presented with abdominal pain in November 2017. She follows with Dr. Monk and was recently started on cycle 1 FOLFIRINOX on 1/15/2018. Her course has been complicated by walled-off pancreatic necrosis and infected necrotizing pancreatitis, for which she was hospitalized 12/9 to 12/13/2017 and underwent endoscopic necrosectomy. Imaging this admission most consistent with acute pancreatitis, with the previously demonstrated pancreatic tail mass appearing stable.  - Pancreatic enzymes pending advancement in diet.  - Discussed next cycle of FOLFIRINOX (due 1/29) with Dr. Monk. Would delay by one week and plan to have pt see AMOR next week in clinic (requested).     #Mild-Moderate Malnutrition.   Secondary to underlying malignancy and inability to tolerate PO intake from pancreatitis.   - Advancing diet as tolerated after EUS  - Consider adding enteral nutrition pending toleration of diet advancement      FEN:   - FLD and advance to regular diet 1/27 if tolerated  - D5+NS+KCl at 100 ml/hr  - Replete lytes PRN    Prophylaxis:   - VTE: Ambulatory, mechanical   - GI: Senna/Miralax prn    Code: FULL    Disposition: Anticipate discharge to home in the next several days pending advancement in diet and transition to PO pain control, likely 1/27.      This plan of care has been discussed with the staff physician, Dr. Demond Potts PAQuiqueC  Hematology/Oncology  Pager:  [phone]    Interval History   Feels very well today. Pain is well managed with minimal amount of Dilaudid and will transition to PO Oxycodone. Hoping to advance diet quickly. No nausea. Excited the procedure was successful. Discussed likely discharge 1/27 if diet is advanced.     Physical Exam   Temp: 97.5  F (36.4  C) Temp src: Oral BP: 95/62 Pulse: 55 Heart Rate: 61 Resp: 18 SpO2: 98 % O2 Device: None (Room air) Oxygen Delivery: 1 LPM  Vitals:    01/22/18 0956 01/24/18 0915 01/26/18 1300   Weight: 76.2 kg (168 lb 1.6 oz) 75.6 kg (166 lb 9.6 oz) 76.2 kg (168 lb)     Vital Signs with Ranges  Temp:  [96.5  F (35.8  C)-98  F (36.7  C)] 97.5  F (36.4  C)  Pulse:  [48-59] 55  Heart Rate:  [49-63] 61  Resp:  [13-25] 18  BP: ()/(59-85) 95/62  SpO2:  [93 %-100 %] 98 %  I/O last 3 completed shifts:  In: 3171.75 [P.O.:50; I.V.:3121.75]  Out: 1275 [Urine:1275]    Constitutional: Pleasant female seen laying in bed. No apparent distress, and appears stated age.  Eyes: Lids and lashes normal, sclera clear, conjunctiva normal.  ENT: Normocephalic, oral pharynx with moist mucus membranes, tonsils without erythema or exudates, gums normal and good dentition.   Respiratory: No increased work of breathing, good air exchange, clear to auscultation bilaterally, no crackles or wheezing.  Cardiovascular: Regular rate and rhythm, normal S1 and S2, and no murmur noted.  GI: No masses or scars. +BS. Soft. Tenderness on palpation to epigastric region.  Skin: No bruising or bleeding, no redness, warmth, or swelling, no rashes, no lesions, no jaundice.  Extremities: There is no redness, warmth, or swelling of the joints. No lower extremity edema. No cyanosis.  Neurologic: Awake, alert, oriented to name, place and time.    Vascular access: PIV, c/d/i    Medications     - MEDICATION INSTRUCTIONS -       dextrose 5% and 0.9% NaCl with potassium chloride 20 mEq 100 mL/hr at 01/26/18 0048       traZODone  50 mg Oral At Bedtime      amylase-lipase-protease  1-3 capsule Oral TID w/meals     heparin lock flush  5-10 mL Intracatheter Q24H     heparin  5 mL Intracatheter Q28 Days       Data   ROUTINE IP LABS (Last four results)  BMP    Recent Labs  Lab 01/26/18  0815 01/25/18  0735 01/24/18  0800 01/23/18  0845    141 141 140   POTASSIUM 4.2 3.9 4.2 4.2   CHLORIDE 112* 110* 108 106   ILIANA 8.7 8.2* 8.5 8.5   CO2 23 23 25 25   BUN 5* 2* 4* 5*   CR 0.58 0.54 0.56 0.60   * 130* 113* 105*     CBC    Recent Labs  Lab 01/26/18  0815 01/25/18  0735 01/24/18  0800 01/23/18  0845   WBC 6.6 4.0 5.2 7.4   RBC 3.45* 3.27* 3.30* 3.47*   HGB 10.1* 9.6* 9.6* 10.1*   HCT 32.0* 30.6* 31.2* 32.6*   MCV 93 94 95 94   MCH 29.3 29.4 29.1 29.1   MCHC 31.6 31.4* 30.8* 31.0*   RDW 14.0 14.2 14.0 14.0    204 217 199     INR    Recent Labs  Lab 01/20/18  1230   INR 1.08

## 2018-01-26 NOTE — PLAN OF CARE
Problem: Patient Care Overview  Goal: Plan of Care/Patient Progress Review  Outcome: Therapy, progress towards functional goals is fair  Pt. Up ad soni in the [place]. PCA and Capno  DC'ed. Declines pain meds at this time. + BS and flatus. Denies nausea. Bradycardic, baseline per pt. Vdg. Spont. Adelaida. Cl. Liq diet, will adv. To Fl liq. This evening. Pt will be DC'ed denia. If she adelaida. Soft foods in the am.

## 2018-01-26 NOTE — PROVIDER NOTIFICATION
Notified Resident at 1647 PM regarding changes in vital signs.      Spoke with: Pagegeovanna PINTO    Orders were not obtained.    Comments: Paged MD regarding HR in low 50s. Pt asymptomatic. Waiting for response.

## 2018-01-26 NOTE — PROGRESS NOTES
GASTROENTEROLOGY PROGRESS NOTE  Date of Service: 01/26/2018    ASSESSMENT:  Kitty Bangura is a 57 year old female with a history of acute necrotizing pancreatitis s/p multiple necrosectomies (see below) and recently diagnosed adenocarcinoma of pancreatic tail (MRI in 4/2004 with 2.5 cm rounded focus of increased T2 signal in the region of the pancreatic tail) started on cycle 1 FOLFIRINOX 1/15/18 who presented to the ED with complaints of abdominal pain, nausea and vomiting.       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 Had endoscopic necrosectomy on 12/4. She completed a week long course of antibiotics and developed fever. Repeat endoscopic necrosectomy 12/12 with copious amounts of pus and necrotic debris in cavity, which was completely removed and 2 new DPT Solus stents were placed which subsequently were passed in her stool.       This admission, LFTs normal, patient is afebrile and she does not have a leukocytosis. CT imaging revealing recurrent collection in pancreatic body and disconnected duct with pancreatitis. GI is consulted for further evaluation and management.     S/p ERCP with placement of pancreatic duct stent and EUS cyst gastrostomy 1/25/18.     RECOMMENDATIONS:   - Discontinue PCA pump as patient is not requiring   - If pain management is required, recommend starting as needed oxycodone; would also send patient home with short course of narcotics   - Clear liquid diet for breakfast; if tolerates can advance to regular diet for lunch   - Ok for ongoing chemotherapy from GI perspective   - If patient tolerates diet, pain is under reasonable control, patient may discharge later today.   - Outpatient AXR in 3-4 weeks to ensure pancreatic stent passage    The patient was  "discussed and plan agreed upon with GI staff.    Mami Richards MD  GI Consult Service  _______________________________________________________________  S: No acute events overnight. Patient reports minimal abdominal pain. No nausea/vomiting, fevers/chills, chest pain, shortness of breath. Minimal use of PCA. Ambulating halls frequently. Hoping to advance diet and discharge home.     O:  Blood pressure 114/63, pulse (!) 48, temperature 96.5  F (35.8  C), temperature source Oral, resp. rate 22, height 1.651 m (5' 5\"), weight 75.6 kg (166 lb 9.6 oz), SpO2 99 %, not currently breastfeeding.    Gen: Alert, NAD  HEENT: No scleral icterus  Lungs: No increased WOB  Abd: Soft, Nt, ND  Skin: No jaundice  MS: WWP  Neuro: Alert and oriented      LABS:  BMP  Recent Labs  Lab 01/26/18  0815 01/25/18  0735 01/24/18  0800 01/23/18  0845    141 141 140   POTASSIUM 4.2 3.9 4.2 4.2   CHLORIDE 112* 110* 108 106   ILIANA 8.7 8.2* 8.5 8.5   CO2 23 23 25 25   BUN 5* 2* 4* 5*   CR 0.58 0.54 0.56 0.60   * 130* 113* 105*     CBC  Recent Labs  Lab 01/26/18  0815 01/25/18  0735 01/24/18  0800 01/23/18  0845   WBC 6.6 4.0 5.2 7.4   RBC 3.45* 3.27* 3.30* 3.47*   HGB 10.1* 9.6* 9.6* 10.1*   HCT 32.0* 30.6* 31.2* 32.6*   MCV 93 94 95 94   MCH 29.3 29.4 29.1 29.1   MCHC 31.6 31.4* 30.8* 31.0*   RDW 14.0 14.2 14.0 14.0    204 217 199     INR  Recent Labs  Lab 01/20/18  1230   INR 1.08     LFTs  Recent Labs  Lab 01/26/18  0815 01/25/18  0735 01/24/18  0800 01/23/18  0845   ALKPHOS 178* 72 69 64   * 23 23 21   * 33 35 29   BILITOTAL 0.7 0.5 0.8 0.9   PROTTOTAL 6.9 6.2* 6.3* 6.3*   ALBUMIN 2.8* 2.5* 2.7* 2.7*      PANC  Recent Labs  Lab 01/20/18  1230   LIPASE 103       "

## 2018-01-26 NOTE — PLAN OF CARE
Problem: Patient Care Overview  Goal: Plan of Care/Patient Progress Review  Outcome: Therapy, progress toward functional goals as expected  HR has been 47 BPM-mid 50s this shift. MD aware. VSS. Pain controlled on Dilaudid PCA. +BS, +flatus (as of 2236). Voiding spontaneously with adequate output. No nausea. NPO. Up ad soni. Pt walked multiple times tonight with spouse. Post of day 0. Plan: Continue to monitor. Pt would not like bedside report.

## 2018-01-26 NOTE — PLAN OF CARE
Problem: Patient Care Overview  Goal: Plan of Care/Patient Progress Review  Outcome: Therapy, progress toward functional goals as expected  Bradycardic 45-50s, MDs aware. Other VSS and capno WNL on room air. Pain managed with PCA Dilaudid, using sparingly. NPO, using mouth swabs for comfort. Up independently. Voiding spontaneously, sometimes not saving. Bowel sounds present, passing some gas. Continue with plan of care. Patient would like bedside report.

## 2018-01-27 VITALS
HEIGHT: 65 IN | RESPIRATION RATE: 18 BRPM | HEART RATE: 60 BPM | OXYGEN SATURATION: 96 % | BODY MASS INDEX: 27.99 KG/M2 | SYSTOLIC BLOOD PRESSURE: 118 MMHG | TEMPERATURE: 97.7 F | WEIGHT: 168 LBS | DIASTOLIC BLOOD PRESSURE: 71 MMHG

## 2018-01-27 LAB
ALBUMIN SERPL-MCNC: 2.7 G/DL (ref 3.4–5)
ALP SERPL-CCNC: 125 U/L (ref 40–150)
ALT SERPL W P-5'-P-CCNC: 153 U/L (ref 0–50)
ANION GAP SERPL CALCULATED.3IONS-SCNC: 8 MMOL/L (ref 3–14)
AST SERPL W P-5'-P-CCNC: 101 U/L (ref 0–45)
BASOPHILS # BLD AUTO: 0 10E9/L (ref 0–0.2)
BASOPHILS NFR BLD AUTO: 0.2 %
BILIRUB SERPL-MCNC: 0.4 MG/DL (ref 0.2–1.3)
BUN SERPL-MCNC: 6 MG/DL (ref 7–30)
CALCIUM SERPL-MCNC: 8.5 MG/DL (ref 8.5–10.1)
CHLORIDE SERPL-SCNC: 114 MMOL/L (ref 94–109)
CO2 SERPL-SCNC: 23 MMOL/L (ref 20–32)
CREAT SERPL-MCNC: 0.62 MG/DL (ref 0.52–1.04)
DIFFERENTIAL METHOD BLD: ABNORMAL
EOSINOPHIL # BLD AUTO: 0.1 10E9/L (ref 0–0.7)
EOSINOPHIL NFR BLD AUTO: 1.4 %
ERYTHROCYTE [DISTWIDTH] IN BLOOD BY AUTOMATED COUNT: 14.5 % (ref 10–15)
GFR SERPL CREATININE-BSD FRML MDRD: >90 ML/MIN/1.7M2
GLUCOSE SERPL-MCNC: 89 MG/DL (ref 70–99)
HCT VFR BLD AUTO: 30.1 % (ref 35–47)
HGB BLD-MCNC: 9.4 G/DL (ref 11.7–15.7)
IMM GRANULOCYTES # BLD: 0 10E9/L (ref 0–0.4)
IMM GRANULOCYTES NFR BLD: 0 %
LYMPHOCYTES # BLD AUTO: 2.7 10E9/L (ref 0.8–5.3)
LYMPHOCYTES NFR BLD AUTO: 54.4 %
MAGNESIUM SERPL-MCNC: 1.9 MG/DL (ref 1.6–2.3)
MCH RBC QN AUTO: 29.5 PG (ref 26.5–33)
MCHC RBC AUTO-ENTMCNC: 31.2 G/DL (ref 31.5–36.5)
MCV RBC AUTO: 94 FL (ref 78–100)
MONOCYTES # BLD AUTO: 0.4 10E9/L (ref 0–1.3)
MONOCYTES NFR BLD AUTO: 8.8 %
NEUTROPHILS # BLD AUTO: 1.7 10E9/L (ref 1.6–8.3)
NEUTROPHILS NFR BLD AUTO: 35.2 %
NRBC # BLD AUTO: 0 10*3/UL
NRBC BLD AUTO-RTO: 0 /100
PHOSPHATE SERPL-MCNC: 3.2 MG/DL (ref 2.5–4.5)
PLATELET # BLD AUTO: 200 10E9/L (ref 150–450)
POTASSIUM SERPL-SCNC: 3.9 MMOL/L (ref 3.4–5.3)
PROT SERPL-MCNC: 6 G/DL (ref 6.8–8.8)
RBC # BLD AUTO: 3.19 10E12/L (ref 3.8–5.2)
SODIUM SERPL-SCNC: 146 MMOL/L (ref 133–144)
WBC # BLD AUTO: 4.9 10E9/L (ref 4–11)

## 2018-01-27 PROCEDURE — 25800025 ZZH RX 258: Performed by: PHYSICIAN ASSISTANT

## 2018-01-27 PROCEDURE — 84100 ASSAY OF PHOSPHORUS: CPT | Performed by: PHYSICIAN ASSISTANT

## 2018-01-27 PROCEDURE — 99238 HOSP IP/OBS DSCHRG MGMT 30/<: CPT | Performed by: INTERNAL MEDICINE

## 2018-01-27 PROCEDURE — 25000128 H RX IP 250 OP 636: Performed by: INTERNAL MEDICINE

## 2018-01-27 PROCEDURE — 36592 COLLECT BLOOD FROM PICC: CPT | Performed by: PHYSICIAN ASSISTANT

## 2018-01-27 PROCEDURE — 85025 COMPLETE CBC W/AUTO DIFF WBC: CPT | Performed by: PHYSICIAN ASSISTANT

## 2018-01-27 PROCEDURE — 83735 ASSAY OF MAGNESIUM: CPT | Performed by: PHYSICIAN ASSISTANT

## 2018-01-27 PROCEDURE — 25000132 ZZH RX MED GY IP 250 OP 250 PS 637: Performed by: PHYSICIAN ASSISTANT

## 2018-01-27 PROCEDURE — 80053 COMPREHEN METABOLIC PANEL: CPT | Performed by: PHYSICIAN ASSISTANT

## 2018-01-27 RX ORDER — OXYCODONE HYDROCHLORIDE 5 MG/1
5 TABLET ORAL EVERY 4 HOURS PRN
Qty: 30 TABLET | Refills: 0 | Status: ON HOLD | OUTPATIENT
Start: 2018-01-27 | End: 2018-04-22

## 2018-01-27 RX ADMIN — SODIUM CHLORIDE, PRESERVATIVE FREE 5 ML: 5 INJECTION INTRAVENOUS at 12:55

## 2018-01-27 RX ADMIN — SODIUM CHLORIDE 500 ML: 9 INJECTION, SOLUTION INTRAVENOUS at 01:12

## 2018-01-27 RX ADMIN — PANCRELIPASE 48000 UNITS: 24000; 76000; 120000 CAPSULE, DELAYED RELEASE PELLETS ORAL at 08:31

## 2018-01-27 RX ADMIN — POTASSIUM CHLORIDE, DEXTROSE MONOHYDRATE AND SODIUM CHLORIDE: 150; 5; 900 INJECTION, SOLUTION INTRAVENOUS at 02:22

## 2018-01-27 RX ADMIN — PANCRELIPASE 24000 UNITS: 24000; 76000; 120000 CAPSULE, DELAYED RELEASE PELLETS ORAL at 08:36

## 2018-01-27 RX ADMIN — PANCRELIPASE 24000 UNITS: 24000; 76000; 120000 CAPSULE, DELAYED RELEASE PELLETS ORAL at 12:47

## 2018-01-27 NOTE — PLAN OF CARE
Problem: Patient Care Overview  Goal: Plan of Care/Patient Progress Review  Outcome: Improving  VSS ex. BP (80/32, 80/47). 500cc bolus given at 2255 (BP 92/58) and again around 0100 (113/63). A&Ox4. Apical pulse regular. Lungs CTA. Bowel sounds active in all quadrants. Tolerating regular diet. Voiding spontaneously with adequate output. MIVF running at 10cc/hr. Denies pain. Port-a-cath needle due to be changed today. Continue with POC. Pt would not like bedside report if awake.

## 2018-01-27 NOTE — DISCHARGE SUMMARY
[facility], [place]    Discharge Summary  Hematology / Oncology    Date of Admission:  1/20/2018  Date of Discharge:  1/27/2018  1:28 PM  Discharging Provider: Venus Potts  Date of Service (when I saw the patient): 01/27/18    Discharge Diagnoses   Pancreatic adenocarcinoma  Acute pancreatitis     History of Present Illness   Kitty Bangura is a 57 year old female with recently diagnosed pancreatic adenocarcinoma, started on cycle 1 FOLFIRINOX on 1/15/2018. Her course has been complicated by walled-off pancreatic necrosis and infected necrotizing pancreatitis for which she was hospitalized in December (requiring endoscopic necrosectomy). She presented to the ED for evaluation of a 3 day history of worsening abdominal pain, nausea and vomiting, and was found to have imaging findings consistent with acute pancreatitis.    Please see H&P for further detail of history.     Hospital Course   Kitty Bangura was admitted on 1/20/2018.  The following problems were addressed during her hospitalization:    #Abdominal pain, nausea and vomiting   #Acute pancreatitis.  Patient presented with worsening abdominal pain and N/V. CT AP 1/20/18 shows findings consistent with acute pancreatitis with increased peripancreatic inflammation and new peripancreatic fluid collections. There is no definite pancreatic necrosis, but these collections may represent peripancreatic acute necrotic collections or pseudocysts (per radiology). Patient was initially managed conservatively with bowel rest, IVF and IV pain control. No indication for antibiotics as patient had no evidence of infection. GI was consulted and patient was taken to the OR on 1/25/18. Dr. Sanches attempted ERCP with pancreatic stent placement, but unfortunately pancreatic duct was completely obstructed and a wire was unable to pass beyond duct. Dr. Metz then subsequently performed successful EUS guided cyst gastrostomy on 1/25/18. After  the procedure, patient's diet was advanced to a regular diet with toleration and abdominal pain significantly improved. Of note, A pancreatic stent was deployed in the ERCP and an abdominal X-ray should be done in 3-4 weeks to ensure stent passed.     #Transaminitis.   Suspect is inflammatory effect from ERCP. Patient is asymptomatic and LFTs improving on day of discharge. CMP ordered for follow-up.       #Pancreatic adenocarcinoma.  Recently diagnosed after she presented with abdominal pain in November 2017. She follows with Dr. Monk and was recently started on cycle 1 FOLFIRINOX on 1/15/2018. Her course has been complicated by walled-off pancreatic necrosis and infected necrotizing pancreatitis, for which she was hospitalized 12/9 to 12/13/2017 and underwent endoscopic necrosectomy. Imaging this admission most consistent with acute pancreatitis, with the previously demonstrated pancreatic tail mass appearing stable. Discussed next cycle of FOLFIRINOX (due 1/29) with Dr. Monk. Would delay by one week and plan to have pt see AMOR next week in clinic - scheduled to see AMOR on 1/31 and a request was sent for AMOR/labs/infusion for FOLFIRINOX on 2/5.      #Mild-Moderate Malnutrition.   Secondary to underlying malignancy and inability to tolerate PO intake from pancreatitis. Patient tolerating a regular diet on day of discharge.      Disposition: Discharged to home on 1/27. Has follow-up with [facility] and labs on 1/31 and a request was sent for AMOR/labs/infusion for FOLFIRINOX on 2/5.       This plan of care has been discussed with the staff physician, Dr. Sly Potts PA-C  Hematology/Oncology  Pager: [phone]    Significant Results and Procedures   Results for orders placed or performed during the hospital encounter of 01/20/18   CT Abdomen Pelvis w Contrast    Narrative    Examination:  CT ABDOMEN PELVIS W CONTRAST 1/20/2018 2:14 PM     History: Abdominal pain. Additional history per outside medical  record  includes history of pancreatitis and pancreatic cancer status post  first round of chemotherapy 5 days ago. Abdominal pain, nausea,  vomiting. Lower abdominal pain began 3 days ago, cramping. Difficulty  passing stool.     Comparison: CT chest, abdomen, and pelvis 1/10/2018 comment CT of the  abdomen and pelvis 12/27/2017, CT 11/20/2017    Technique: CT of the abdomen and pelvis were obtained following the  administration of contrast. Sagittal and coronal reconstructions  created and reviewed.    Findings:   Scattered hypoattenuating foci in the liver are not changed from  12/7/2017, too small to characterize. No new hepatic lesions are  identified. The spleen, adrenal glands, and gallbladder are  unremarkable. Symmetric nephrographic phase without hydronephrosis or  renal calculi. Scattered hypoattenuating lesions are too small to  characterize, likely simple cysts. The urinary bladder is  unremarkable.    At the junction of the body and tail of the pancreas there is a  heterogeneously dense lesion containing calcifications and measuring  3.5 x 2.9 cm, previously 3.4 x 3.0 cm. The remaining pancreas is  atrophic in appearance with pancreatic ductal dilatation in the body,  which is new from previous exam. There are new peripancreatic fluid  collections (series 4 image 47). The largest of these measures 2.6 x  3.0 cm on axial images. There is significant peripancreatic stranding,  increased from previous CT.    The small and large bowel are normal in caliber. Previously  demonstrated gastric/pyloric wall thickening persists. There is new  small to moderate volume of abdominal free fluid in the low pelvis. No  inguinal, mesenteric, or retroperitoneal lymphadenopathy. The major  abdominal vasculature is patent, however the superior mesenteric vein  narrows at the level of the peripancreatic fluid collections (series 2  image 59), increased from previous CT. Small ascites.    No acute osseous abnormalities. The  lung bases are clear.      Impression    Impression:   1. Acute pancreatitis with increased peripancreatic inflammation and  new peripancreatic fluid collections. No definite pancreatic necrosis,  and these collections may represent peripancreatic acute necrotic  collections or pseudocysts.  2. A 3.5 cm heterogeneously dense lesion with calcifications at the  junction of the pancreatic body and tail is not significantly changed,  consistent with patient's known adenocarcinoma.    I have personally reviewed the examination and initial interpretation  and I agree with the findings.    SUSAN CAMARENA MD   XR Surgery YA G/T 5 Min Fluoro w Stills    Narrative    This exam was marked as non-reportable because it will not be read by a   radiologist or a [facility] non-radiologist provider.               Pending Results   None    Code Status   Full Code    Primary Care Physician   Solange Mcgill    Physical Exam   Temp: 97.7  F (36.5  C) Temp src: Oral BP: 118/71 Pulse: 60 Heart Rate: 52 Resp: 18 SpO2: 96 % O2 Device: None (Room air)    Vitals:    01/22/18 0956 01/24/18 0915 01/26/18 1300   Weight: 76.2 kg (168 lb 1.6 oz) 75.6 kg (166 lb 9.6 oz) 76.2 kg (168 lb)     Vital Signs with Ranges  Temp:  [97.3  F (36.3  C)-97.8  F (36.6  C)] 97.7  F (36.5  C)  Pulse:  [60] 60  Heart Rate:  [50-60] 52  Resp:  [16-18] 18  BP: ()/(32-71) 118/71  SpO2:  [96 %-100 %] 96 %  I/O last 3 completed shifts:  In: 4550 [P.O.:1130; I.V.:3420]  Out: -     Time Spent on this Encounter   I, Venus Potts, personally saw the patient today and spent less than or equal to 30 minutes discharging this patient.    Discharge Disposition   Discharged to home  Condition at discharge: Stable    Consultations This Hospital Stay   MEDICATION HISTORY IP PHARMACY CONSULT  GI PANCREATICOBILIARY ADULT IP CONSULT  GI PANCREATICOBILIARY ADULT IP CONSULT  NUTRITION SERVICES ADULT IP CONSULT  PHYSICAL THERAPY ADULT IP CONSULT    Discharge  Orders     CBC with platelets differential   Last Lab Result: Hemoglobin (g/dL)      Date                     Value                01/27/2018               9.4 (L)          ----------     **Comprehensive metabolic panel FUTURE anytime     Reason for your hospital stay   You were admitted for pancreatitis. You had an EUS with cyst gastrostomy performed by Dr. Metz. Your pain is now well controlled and you are tolerating a regular diet. You are safe to discharge home.     Adult [facility] Follow-up and recommended labs and tests   You have a hospital follow-up appointment with Kellie Nobles on 1/31/18. Your infusion appointment will be rescheduled for 2/5/18 (will likely be rescheduled on Monday).     Appointments on [facility] and/or [facility] (with [facility] or [facility] provider or service). Call [phone] if you haven't heard regarding these appointments within 7 days of discharge.    Please see follow-up appointments below:     Activity   Your activity upon discharge: Regular activity as tolerated.     When to contact your care team   Call the [facility] 24-hour triage line at [phone] or [phone] for temp >100.4, uncontrolled nausea/vomiting/diarrhea/constipation, unrelieved pain, bleeding not relieved with pressure, dizziness, chest pain, shortness of breath, loss of consciousness, and any new or concerning symptoms.     Call [phone] and ask for the care coordinator that works with your oncologist if you have questions about scans, appointments, hospital follow-up, or other concerns.     Full Code     Diet   Follow this diet upon discharge: Regular diet as tolerated. Please eat foods that are soft in texture to allow the new stent in your stomach to heal.       Discharge Medications   Discharge Medication List as of 1/27/2018 12:49 PM      START taking these medications    Details   oxyCODONE IR (ROXICODONE) 5 MG tablet Take 1 tablet (5 mg) by mouth every 4 hours as needed for  moderate to severe pain, Disp-30 tablet, R-0, Local Print         CONTINUE these medications which have NOT CHANGED    Details   ondansetron (ZOFRAN) 8 MG tablet Take 1 tablet (8 mg) by mouth every 8 hours as needed for nausea, Disp-30 tablet, R-0, E-Prescribe      LORazepam (ATIVAN) 0.5 MG tablet Take 1 tablet (0.5 mg) by mouth every 4 hours as needed (Anxiety, Nausea/Vomiting or Sleep), Disp-30 tablet, R-2, Local Print      prochlorperazine (COMPAZINE) 10 MG tablet Take 1 tablet (10 mg) by mouth every 6 hours as needed (Nausea/Vomiting), Disp-30 tablet, R-2, E-Prescribe      amylase-lipase-protease (CREON) 58304-68474 UNITS CPEP per EC capsule Take 2-3 with meals / 1-2 with snacks, up to 15 per day., Disp-450 capsule, R-6, E-Prescribe         STOP taking these medications       loperamide (IMODIUM) 2 MG capsule Comments:   Reason for Stopping:         HYDROcodone-acetaminophen (NORCO) 5-325 MG per tablet Comments:   Reason for Stopping:         acetaminophen (TYLENOL) 325 MG tablet Comments:   Reason for Stopping:             Allergies   No Known Allergies  Data   ROUTINE IP LABS (Last four results)  BMP  Recent Labs  Lab 01/27/18  0809 01/26/18  0815 01/25/18  0735 01/24/18  0800   * 142 141 141   POTASSIUM 3.9 4.2 3.9 4.2   CHLORIDE 114* 112* 110* 108   ILIANA 8.5 8.7 8.2* 8.5   CO2 23 23 23 25   BUN 6* 5* 2* 4*   CR 0.62 0.58 0.54 0.56   GLC 89 135* 130* 113*     CBC  Recent Labs  Lab 01/27/18  0809 01/26/18  0815 01/25/18  0735 01/24/18  0800   WBC 4.9 6.6 4.0 5.2   RBC 3.19* 3.45* 3.27* 3.30*   HGB 9.4* 10.1* 9.6* 9.6*   HCT 30.1* 32.0* 30.6* 31.2*   MCV 94 93 94 95   MCH 29.5 29.3 29.4 29.1   MCHC 31.2* 31.6 31.4* 30.8*   RDW 14.5 14.0 14.2 14.0    251 204 217     INRNo lab results found in last 7 days.

## 2018-01-27 NOTE — PLAN OF CARE
Problem: Patient Care Overview  Goal: Plan of Care/Patient Progress Review  Outcome: Improving  AVSS. A&Ox4. Apical pulse regular. Lungs CTA. Abdomen soft. Bowel sounds active in all quadrants. Tolerating full liquid diet. Voiding spontaneously with adequate output, not saving urine. MIVF running at 10cc/hr through CVC. Denies pain. Continue with POC. Pt would not like bedside report if awake.

## 2018-01-29 ENCOUNTER — CARE COORDINATION (OUTPATIENT)
Dept: ONCOLOGY | Facility: CLINIC | Age: 58
End: 2018-01-29

## 2018-01-29 ENCOUNTER — TELEPHONE (OUTPATIENT)
Dept: FAMILY MEDICINE | Facility: CLINIC | Age: 58
End: 2018-01-29

## 2018-01-29 ENCOUNTER — HOME INFUSION (PRE-WILLOW HOME INFUSION) (OUTPATIENT)
Dept: PHARMACY | Facility: CLINIC | Age: 58
End: 2018-01-29

## 2018-01-29 ASSESSMENT — ENCOUNTER SYMPTOMS
TINGLING: 0
DEPRESSION: 0
JAUNDICE: 0
ABDOMINAL PAIN: 1
NAUSEA: 1
PANIC: 0
BLOOD IN STOOL: 0
DEPRESSION: 0
DIARRHEA: 1
WEIGHT GAIN: 0
POLYPHAGIA: 0
SPEECH CHANGE: 0
PARALYSIS: 0
FATIGUE: 0
POLYPHAGIA: 0
NUMBNESS: 0
INCREASED ENERGY: 0
ALTERED TEMPERATURE REGULATION: 1
DIZZINESS: 1
VOMITING: 0
FEVER: 0
DIZZINESS: 1
DIARRHEA: 1
NAIL CHANGES: 0
MEMORY LOSS: 0
POLYDIPSIA: 0
INSOMNIA: 1
POOR WOUND HEALING: 0
POLYDIPSIA: 0
PARALYSIS: 0
HALLUCINATIONS: 0
RECTAL PAIN: 0
VOMITING: 0
CHILLS: 0
SEIZURES: 0
NERVOUS/ANXIOUS: 1
DECREASED APPETITE: 0
NUMBNESS: 0
CHILLS: 0
NAIL CHANGES: 0
POOR WOUND HEALING: 0
DECREASED APPETITE: 0
HEARTBURN: 1
HALLUCINATIONS: 0
DECREASED CONCENTRATION: 0
PANIC: 0
HEADACHES: 0
MEMORY LOSS: 0
TREMORS: 0
TINGLING: 0
NAUSEA: 1
BLOATING: 1
HEADACHES: 0
NIGHT SWEATS: 0
LOSS OF CONSCIOUSNESS: 0
TREMORS: 0
DISTURBANCES IN COORDINATION: 0
CONSTIPATION: 1
ALTERED TEMPERATURE REGULATION: 1
LOSS OF CONSCIOUSNESS: 0
RECTAL PAIN: 0
BLOOD IN STOOL: 0
BLOATING: 1
WEIGHT LOSS: 1
NERVOUS/ANXIOUS: 1
WEAKNESS: 0
WEAKNESS: 0
SKIN CHANGES: 0
NIGHT SWEATS: 0
JAUNDICE: 0
DISTURBANCES IN COORDINATION: 0
CONSTIPATION: 1
ABDOMINAL PAIN: 1
FEVER: 0
DECREASED CONCENTRATION: 0
BOWEL INCONTINENCE: 0
FATIGUE: 0
SEIZURES: 0
SPEECH CHANGE: 0
INSOMNIA: 1
WEIGHT LOSS: 1
HEARTBURN: 1
INCREASED ENERGY: 0
WEIGHT GAIN: 0
SKIN CHANGES: 0
BOWEL INCONTINENCE: 0

## 2018-01-29 NOTE — TELEPHONE ENCOUNTER
"ED/Discharge Protocol    \"Hi, my name is Yvonne Ramirez, a registered nurse, and I am calling on behalf of Dr. Mcgill's office at [place].  I am calling to follow up and see how things are going for you after your recent visit.\"    \"I see that you were in the (ER/UC/IP) on 1-20-18.    How are you doing now that you are home?\" \"doing ok.  Laying low. Take it easy.\"    Is patient experiencing symptoms that may require a hospital visit?  No    Discharge Instructions    \"Let's review your discharge instructions.  What is/are the follow-up recommendations?  Pt. Response: F/u with oncology.    \"Were you instructed to make a follow-up appointment?\"  Pt. Response: No.       \"When you see the provider, I would recommend that you bring your discharge instructions with you.    Medications    \"How many new medications are you on since your hospitalization/ED visit?\"    0-1  \"How many of your current medicines changed (dose, timing, name, etc.) while you were in the hospital/ED visit?\"   0-1  \"Do you have questions about your medications?\"   No  \"Were you newly diagnosed with heart failure, COPD, diabetes or did you have a heart attack?\"   No  For patients on insulin: \"Did you start on insulin in the hospital or did you have your insulin dose changed?\"   No    Medication reconciliation completed? Yes    Was MTM referral placed (*Make sure to put transitions as reason for referral)?   No    Call Summary    \"Do you have any questions or concerns about your condition or care plan at the moment?\"    No  Triage nurse advice given: Call with questions.    Patient was in ER 0 in the past year (assess appropriateness of ER visits.)      \"If you have questions or things don't continue to improve, we encourage you contact us through the main clinic number,  [phone].  Even if the clinic is not open, triage nurses are available 24/7 to help you.     We would like you to know that our clinic has extended hours (provide information).  " "We also have urgent care (provide details on closest location and hours/contact info)\"      \"Thank you for your time and take care!\"      Yvonne DIXON RN    "

## 2018-01-29 NOTE — PROGRESS NOTES
The patient was called for hospital discharge follow-up.  She was admitted on 1/20/18 and discharged on 1/27/18.  She was treated for acute pancreatitis, and pancreatic adenocarcinoma.  The patient said that she is doing okay.  She said that she is feeling better this morning.  She said that she is taking pain medications as needed for the abdominal pain, and the medications are helping.  She feels better sitting up.    She is eating small amounts, and she feels this is back to what she was doing prior to her hospitalization.    She does not have an elevated temperature.    She does have occasional intestinal cramping, and passes a soft stool after the cramping.    She is aware of her follow-up appointments on 1/31/18, and 2/5/18.  She asked if we could get her in here at our clinic for the mammogram and US .  A message was sent to scheduling.  She is aware that her appointments for infusions, scan and follow-up with Dr. Monk will need to be rescheduled after she restarts chemotherapy on 2/5/18.

## 2018-01-30 LAB — ERCP: NORMAL

## 2018-01-30 NOTE — PROGRESS NOTES
This is a recent snapshot of the patient's [facility] medical record.  For current drug dose and complete information and questions, call [phone] or In Basket pool, [facility] home infusion ([id number])  CSN Number:  [id number]

## 2018-01-31 ENCOUNTER — RADIANT APPOINTMENT (OUTPATIENT)
Dept: MAMMOGRAPHY | Facility: CLINIC | Age: 58
End: 2018-01-31
Attending: PHYSICIAN ASSISTANT
Payer: COMMERCIAL

## 2018-01-31 ENCOUNTER — ONCOLOGY VISIT (OUTPATIENT)
Dept: ONCOLOGY | Facility: CLINIC | Age: 58
End: 2018-01-31
Attending: PHYSICIAN ASSISTANT
Payer: COMMERCIAL

## 2018-01-31 ENCOUNTER — APPOINTMENT (OUTPATIENT)
Dept: LAB | Facility: CLINIC | Age: 58
End: 2018-01-31
Attending: PHYSICIAN ASSISTANT
Payer: COMMERCIAL

## 2018-01-31 VITALS
SYSTOLIC BLOOD PRESSURE: 98 MMHG | HEART RATE: 78 BPM | HEIGHT: 65 IN | WEIGHT: 161.3 LBS | TEMPERATURE: 97.7 F | OXYGEN SATURATION: 97 % | RESPIRATION RATE: 16 BRPM | BODY MASS INDEX: 26.87 KG/M2 | DIASTOLIC BLOOD PRESSURE: 60 MMHG

## 2018-01-31 DIAGNOSIS — K85.91 NECROTIZING PANCREATITIS: ICD-10-CM

## 2018-01-31 DIAGNOSIS — N63.0 BREAST MASS: ICD-10-CM

## 2018-01-31 DIAGNOSIS — C25.9 PANCREATIC ADENOCARCINOMA (H): ICD-10-CM

## 2018-01-31 LAB
ALBUMIN SERPL-MCNC: 3.4 G/DL (ref 3.4–5)
ALP SERPL-CCNC: 118 U/L (ref 40–150)
ALT SERPL W P-5'-P-CCNC: 73 U/L (ref 0–50)
ANION GAP SERPL CALCULATED.3IONS-SCNC: 6 MMOL/L (ref 3–14)
AST SERPL W P-5'-P-CCNC: 26 U/L (ref 0–45)
BASOPHILS # BLD AUTO: 0.1 10E9/L (ref 0–0.2)
BASOPHILS NFR BLD AUTO: 1.8 %
BILIRUB SERPL-MCNC: 0.2 MG/DL (ref 0.2–1.3)
BUN SERPL-MCNC: 21 MG/DL (ref 7–30)
CALCIUM SERPL-MCNC: 8.8 MG/DL (ref 8.5–10.1)
CHLORIDE SERPL-SCNC: 104 MMOL/L (ref 94–109)
CO2 SERPL-SCNC: 27 MMOL/L (ref 20–32)
CREAT SERPL-MCNC: 0.65 MG/DL (ref 0.52–1.04)
DIFFERENTIAL METHOD BLD: ABNORMAL
EOSINOPHIL # BLD AUTO: 0.1 10E9/L (ref 0–0.7)
EOSINOPHIL NFR BLD AUTO: 1.8 %
ERYTHROCYTE [DISTWIDTH] IN BLOOD BY AUTOMATED COUNT: 14.4 % (ref 10–15)
GFR SERPL CREATININE-BSD FRML MDRD: >90 ML/MIN/1.7M2
GLUCOSE SERPL-MCNC: 100 MG/DL (ref 70–99)
HCT VFR BLD AUTO: 38.7 % (ref 35–47)
HGB BLD-MCNC: 12.4 G/DL (ref 11.7–15.7)
LYMPHOCYTES # BLD AUTO: 2.8 10E9/L (ref 0.8–5.3)
LYMPHOCYTES NFR BLD AUTO: 72.8 %
MCH RBC QN AUTO: 29.7 PG (ref 26.5–33)
MCHC RBC AUTO-ENTMCNC: 32 G/DL (ref 31.5–36.5)
MCV RBC AUTO: 93 FL (ref 78–100)
MONOCYTES # BLD AUTO: 0.6 10E9/L (ref 0–1.3)
MONOCYTES NFR BLD AUTO: 14.5 %
NEUTROPHILS # BLD AUTO: 0.4 10E9/L (ref 1.6–8.3)
NEUTROPHILS NFR BLD AUTO: 9.1 %
PLATELET # BLD AUTO: 276 10E9/L (ref 150–450)
PLATELET # BLD EST: ABNORMAL 10*3/UL
POTASSIUM SERPL-SCNC: 4.1 MMOL/L (ref 3.4–5.3)
PROT SERPL-MCNC: 7.7 G/DL (ref 6.8–8.8)
RBC # BLD AUTO: 4.17 10E12/L (ref 3.8–5.2)
RBC MORPH BLD: NORMAL
SODIUM SERPL-SCNC: 137 MMOL/L (ref 133–144)
WBC # BLD AUTO: 3.9 10E9/L (ref 4–11)

## 2018-01-31 PROCEDURE — 85025 COMPLETE CBC W/AUTO DIFF WBC: CPT | Performed by: PHYSICIAN ASSISTANT

## 2018-01-31 PROCEDURE — 99214 OFFICE O/P EST MOD 30 MIN: CPT | Mod: ZP | Performed by: PHYSICIAN ASSISTANT

## 2018-01-31 PROCEDURE — 36591 DRAW BLOOD OFF VENOUS DEVICE: CPT

## 2018-01-31 PROCEDURE — G0463 HOSPITAL OUTPT CLINIC VISIT: HCPCS | Mod: ZF

## 2018-01-31 PROCEDURE — 25000128 H RX IP 250 OP 636: Mod: ZF | Performed by: PHYSICIAN ASSISTANT

## 2018-01-31 PROCEDURE — 80053 COMPREHEN METABOLIC PANEL: CPT | Performed by: PHYSICIAN ASSISTANT

## 2018-01-31 RX ORDER — HYDROCODONE BITARTRATE AND ACETAMINOPHEN 5; 325 MG/1; MG/1
TABLET ORAL EVERY 4 HOURS PRN
Refills: 0 | Status: ON HOLD | COMMUNITY
Start: 2018-01-11 | End: 2018-04-22

## 2018-01-31 RX ORDER — HEPARIN SODIUM (PORCINE) LOCK FLUSH IV SOLN 100 UNIT/ML 100 UNIT/ML
5 SOLUTION INTRAVENOUS
Status: COMPLETED | OUTPATIENT
Start: 2018-01-31 | End: 2018-01-31

## 2018-01-31 RX ORDER — FLUOROURACIL 50 MG/ML
INJECTION, SOLUTION INTRAVENOUS
COMMUNITY
Start: 2018-01-15 | End: 2018-04-02

## 2018-01-31 RX ADMIN — SODIUM CHLORIDE, PRESERVATIVE FREE 5 ML: 5 INJECTION INTRAVENOUS at 13:32

## 2018-01-31 ASSESSMENT — PAIN SCALES - GENERAL: PAINLEVEL: NO PAIN (0)

## 2018-01-31 NOTE — LETTER
1/31/2018      RE: Kitty Bangura  [address] [facility] [address]         [facility]  FOLLOW-UP VISIT NOTE  Date of visit: Jan 31, 2018    REASON FOR VISIT: newly diagnosed resectable pancreatic cancer, follow up FOLFIRINOX and hospitalization    HPI: Kitty Bangura is a 57 year old woman, previously healthy until she presented in early November 2017 with abdominal pain. CT abdomen on 11/1/17 showed acute pancreatitis (lipase 41,960) and also a 3cm heterogenous pancreatic tail mass. The etiology of pancreatitis is unclear - no obstructing gallstone and not a heavy drinker. She was hospitalized for one week and followed up with Dr. González Metz in GI clinic.   - On 11/29/17 she had endoscopic drainage of walled-off area of pancreatic necrosis by Dr. Metz. During the same procedure she had an EUS FNA of the pancreatic tail mass and pathology showed adenocarcinoma. The mass measured 33 x 27 mm, encapsulated with solid and cystic components, rim calcification, and no evidence of invasion.   - Of note, an abdominal MRI in 2004 showed a 2.5 cm cystic lesion in the tail of the pancreas. She had no interval imaging between 2004 and 2017.   - Her case was discussed at tumor conference on 12/4/17 with plan to complete staging and refer to medical and surgical oncology   - Staging CT chest/abd/pelvis on 12/9/17 showed several small pulmonary nodules (largest 3mm), unchanged mass in the pancreatic tail, mildly prominent mesenteric nodes thought likely reactive, and small right hepatic lobe hypodensities. Abdominal MRI on 12/10/17 showed hepatic hemangiomas, no evidence of metastatic disease to the liver.   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    Kitty met with Dr Monk and discussed neoadjuvant chemotherapy with FOLFIRINOX, then surgery and then adjuvant chemotherapy. She  "was seen on 1/15/18 for cycle 1 and then hospitalized for pancreatitis on 1/20/18. She was d/c 1/27. She presents in hospital follow-up today.       INTERVAL HISTORY: Kitty presents today with her significant other. Kitty was recently hospitalized on 1/20/18 for severe abdominal pain and was diagnosed with acute pancreatitis noted on CT. there is peripancreatic inflammation and new peripancreatic fluid collections, but no definite pancreatic necrosis. She was managed with IV fluids and IV pain control.  GI saw her and she was taken to the operating room and they attempted pancreatic stent placement, but unfortunately the pancreatic duct was completely obstructed.  Successful EUS guided cyst gastrostomy was done on 1/25/2018.      She is doing much better since her hospitalization.  She tolerated the first cycle of FOLFIRINOX fairly well with minimal nausea and improved with Zofran and Compazine.  She did have one brief episode of vomiting and has not vomited since then.  She has been eating at least 3 meals a day with snacks and at least 64 ounces of fluids per day.  She also takes Ativan at night to help her sleep.  She states that her abdominal pain is significantly improved since she was discharged from the hospital.      She admits to some mild neuropathy and cold sensitivity which still persists, however slowly improving.  It is more in the left hand than her right hand and she is not experiencing it in her toes.  She denies any cold sensitivity in her mouth or any mouth sores.  Denies fever, chills, cough, other respiratory symptoms.  Denies bleeding, headache and vision changes, urinary symptoms, diarrhea or constipation.      EXAM:  BP 98/60 (BP Location: Right arm, Patient Position: Sitting, Cuff Size: Adult Regular)  Pulse 78  Temp 97.7  F (36.5  C) (Oral)  Resp 16  Ht 1.651 m (5' 5\")  Wt 73.2 kg (161 lb 4.8 oz)  SpO2 97%  BMI 26.84 kg/m2   Wt Readings from Last 4 Encounters:   01/31/18 73.2 kg " (161 lb 4.8 oz)   01/26/18 76.2 kg (168 lb)   01/15/18 76.6 kg (168 lb 14.4 oz)   01/12/18 76.2 kg (168 lb)     Vital signs were reviewed.   Patient alert and oriented x3.   PERRLA. EOMI. No scleral icterus noted. OP without thrush/sores.  Neck exam: No palpable cervical, supraclavicular or axillary nodes bilaterally.   Heart: RRR no murmurs noted. Port in place, old ecchymosis noted, otherwise c/d/i.  Lungs: clear to auscultation bilaterally.  No crackles or wheezing.   Abd: positive bowel sounds in all four quadrants.  No tenderness to palpation.  No hepatomegaly.   Extremities: No lower extremity edema.   Neuro: grossly intact.   Mood and affect is stable.       LABS:    1/31/2018 13:37   Sodium 137   Potassium 4.1   Chloride 104   Carbon Dioxide 27   Urea Nitrogen 21   Creatinine 0.65   GFR Estimate >90   GFR Estimate If Black >90   Calcium 8.8   Anion Gap 6   Albumin 3.4   Protein Total 7.7   Bilirubin Total 0.2   Alkaline Phosphatase 118   ALT 73 (H)   AST 26   Glucose 100 (H)   WBC 3.9 (L)   Hemoglobin 12.4   Hematocrit 38.7   Platelet Count 276   RBC Count 4.17   MCV 93   MCH 29.7   MCHC 32.0   RDW 14.4   Diff Method Manual Differential   % Neutrophils 9.1   % Lymphocytes 72.8   % Monocytes 14.5   % Eosinophils 1.8   % Basophils 1.8   Absolute Neutrophil 0.4 (LL)   Absolute Lymphocytes 2.8   Absolute Monocytes 0.6   Absolute Eosinophils 0.1   Absolute Basophils 0.1   RBC Morphology Normal   Platelet Estimate Confirming automa...       ASSESSMENT/PLAN:     1. Pancreatic cancer: She received her first cycle of FOLFIRINOX on 1/15/18 and tolerated it well with minimal nausea and cold sensitivity.  She was hospitalized for pancreatitis on 1/20/18.  Status post stent placement symptoms have significantly improved.  Cycle 2 was delayed due to hospitalization and follow-up today for reevaluation.  Continues to have residual cold sensitivity in bilateral hands, left greater than right.  Will continue to monitor this  closely. ANC 0.4 and will plan for cycle 2 next week on 2/5, however would consider Neulasta given her counts today.  She was given a mask today and recommended to use this in crowds greater than 10 people.  She was also told to call the clinic if fever greater than 100.4 or go directly to the emergency room. We also discussed using Claritin along with Neulasta support.   --2/5 with Kris Dudley and cycle 2  --2/19 with Kellie Nobles, cycle 3, Abd XR    2. 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  Continues to have some residual symptoms and will closely monitor.    3. GI pain-much improved with Creon.  She has multiple refills and will contact her pharmacy to obtain one.  GI performed gastrostomy while inpatient and they recommended abdominal x-ray 3-4 weeks after discharge and this has been ordered.  Contacted schedulers to get this on her schedule.  LFTs have significantly improved since stent placement.     4. Breast mass- noted on CT scan from 1/10.  Mammogram and ultrasound of the breast negative today.    Antonieta Lew PA-C    The patient was seen in conjunction with Antonieta Lew PA-C who served as a scribe for today's visit. I have reviewed and edited the above note, and agree with the above findings and plan.    Kellie Nobles PA-C    [facility] [address] [address]  [phone]

## 2018-01-31 NOTE — MR AVS SNAPSHOT
After Visit Summary   1/31/2018    Kitty Bangura    MRN: [id number]           Patient Information     Date Of Birth          1960        Visit Information        Provider Department      1/31/2018 2:40 PM Kellie Nobles PA-C [facility]        Today's Diagnoses     Necrotizing pancreatitis        Pancreatic adenocarcinoma (H)           Follow-ups after your visit        Your next 10 appointments already scheduled     Feb 05, 2018  7:45 AM CST   [facility] Draw with [facility] DRAW   [facility] Draw ([facility])    [address] [place] [address]  [address]  [facility] [address] [phone]            Feb 05, 2018  8:20 AM CST   (Arrive by 8:05 AM)   Return Visit with DANTE Junior   [facility] ([facility])    [address] [place] [address]  [address]  [facility] [id number] [phone]            Feb 05, 2018  9:30 AM CST   Infusion 360 with  ONCOLOGY INFUSION, UC 22 ATC   [facility] ([facility])    [address] [place] [address]  [address]  [facility] [id number] [phone]              Future tests that were ordered for you today     Open Future Orders        Priority Expected Expires Ordered    XR Abdomen 2 Views Routine 2/19/2018 3/19/2018 1/31/2018            Who to contact     If you have questions or need follow up information about today's clinic visit or your schedule please contact [facility] directly at [phone].  Normal or non-critical lab and imaging results will be communicated to you by iWitnesshart, letter or phone within 4 business days after the clinic has received the results. If you do not hear from us within 7 days, please contact the clinic through Intoot or phone. If you have a critical or abnormal lab result, we will notify you by phone as soon as possible.  Submit refill requests through [facility]  "or call your pharmacy and they will forward the refill request to us. Please allow 3 business days for your refill to be completed.          Additional Information About Your Visit        MyChart Information     [employer] gives you secure access to your electronic health record. If you see a primary care provider, you can also send messages to your care team and make appointments. If you have questions, please call your primary care clinic.  If you do not have a primary care provider, please call [phone] and they will assist you.        Care EveryWhere ID     This is your Care EveryWhere ID. This could be used by other organizations to access your [facility] medical records  [id number]        Your Vitals Were     Pulse Temperature Respirations Height Pulse Oximetry BMI (Body Mass Index)    78 97.7  F (36.5  C) (Oral) 16 1.651 m (5' 5\") 97% 26.84 kg/m2       Blood Pressure from Last 3 Encounters:   01/31/18 98/60   01/27/18 118/71   01/17/18 116/75    Weight from Last 3 Encounters:   01/31/18 73.2 kg (161 lb 4.8 oz)   01/26/18 76.2 kg (168 lb)   01/15/18 76.6 kg (168 lb 14.4 oz)              We Performed the Following     **Comprehensive metabolic panel FUTURE anytime     CBC with platelets differential        Primary Care Provider Office Phone # Fax #    Solange Mcgill [phone] [address] [address]        Equal Access to Services     MAN DE GUZMAN : Hadii zaheer ku hadasho Sotrinaali, waaxda luqadaha, qaybta kaalmada marty, waxay ridge gerardo . So [facility] [phone].    ATENCIÓN: Si habla español, tiene a quiros disposición servicios gratuitos de asistencia lingüística. Llame al [phone].    We comply with applicable federal civil rights laws and [place] laws. We do not discriminate on the basis of race, color, national origin, age, disability, sex, sexual orientation, or gender identity.            Thank you!     Thank you for choosing [facility]  for your care. Our goal is always to provide you with excellent care. Hearing back from our patients is one way we can continue to improve our services. Please take a few minutes to complete the written survey that you may receive in the mail after your visit with us. Thank you!             Your Updated Medication List - Protect others around you: Learn how to safely use, store and throw away your medicines at www.disposemymeds.org.          This list is accurate as of 1/31/18 11:59 PM.  Always use your most recent med list.                   Brand Name Dispense Instructions for use Diagnosis    amylase-lipase-protease [id number] UNITS Cpep per EC capsule    CREON    450 capsule    Take 2-3 with meals / 1-2 with snacks, up to 15 per day.    Necrotizing pancreatitis       Fluorouracil 5 GM/100ML Soln    ADURCIL          HYDROcodone-acetaminophen 5-325 MG per tablet    NORCO          LORazepam 0.5 MG tablet    ATIVAN    30 tablet    Take 1 tablet (0.5 mg) by mouth every 4 hours as needed (Anxiety, Nausea/Vomiting or Sleep)    Pancreatic adenocarcinoma (H)       ondansetron 8 MG tablet    ZOFRAN    30 tablet    Take 1 tablet (8 mg) by mouth every 8 hours as needed for nausea    Pancreatic adenocarcinoma (H)       oxyCODONE IR 5 MG tablet    ROXICODONE    30 tablet    Take 1 tablet (5 mg) by mouth every 4 hours as needed for moderate to severe pain    Necrotizing pancreatitis, Pancreatic adenocarcinoma (H)       prochlorperazine 10 MG tablet    COMPAZINE    30 tablet    Take 1 tablet (10 mg) by mouth every 6 hours as needed (Nausea/Vomiting)    Pancreatic adenocarcinoma (H)

## 2018-01-31 NOTE — PROGRESS NOTES
[facility] CANCER CLINIC  FOLLOW-UP VISIT NOTE  Date of visit: Jan 31, 2018    REASON FOR VISIT: newly diagnosed resectable pancreatic cancer, follow up FOLFIRINOX and hospitalization    HPI: Kitty Bangura is a 57 year old woman, previously healthy until she presented in early November 2017 with abdominal pain. CT abdomen on 11/1/17 showed acute pancreatitis (lipase 41,960) and also a 3cm heterogenous pancreatic tail mass. The etiology of pancreatitis is unclear - no obstructing gallstone and not a heavy drinker. She was hospitalized for one week and followed up with Dr. González Metz in GI clinic.   - On 11/29/17 she had endoscopic drainage of walled-off area of pancreatic necrosis by Dr. Metz. During the same procedure she had an EUS FNA of the pancreatic tail mass and pathology showed adenocarcinoma. The mass measured 33 x 27 mm, encapsulated with solid and cystic components, rim calcification, and no evidence of invasion.   - Of note, an abdominal MRI in 2004 showed a 2.5 cm cystic lesion in the tail of the pancreas. She had no interval imaging between 2004 and 2017.   - Her case was discussed at tumor conference on 12/4/17 with plan to complete staging and refer to medical and surgical oncology   - Staging CT chest/abd/pelvis on 12/9/17 showed several small pulmonary nodules (largest 3mm), unchanged mass in the pancreatic tail, mildly prominent mesenteric nodes thought likely reactive, and small right hepatic lobe hypodensities. Abdominal MRI on 12/10/17 showed hepatic hemangiomas, no evidence of metastatic disease to the liver.   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    Kitty met with Dr Monk and discussed neoadjuvant chemotherapy with FOLFIRINOX, then surgery and then adjuvant chemotherapy. She was seen on 1/15/18 for cycle 1 and then hospitalized for pancreatitis on 1/20/18.  "She was d/c 1/27. She presents in hospital follow-up today.       INTERVAL HISTORY: Kitty presents today with her significant other. Kitty was recently hospitalized on 1/20/18 for severe abdominal pain and was diagnosed with acute pancreatitis noted on CT. there is peripancreatic inflammation and new peripancreatic fluid collections, but no definite pancreatic necrosis. She was managed with IV fluids and IV pain control.  GI saw her and she was taken to the operating room and they attempted pancreatic stent placement, but unfortunately the pancreatic duct was completely obstructed.  Successful EUS guided cyst gastrostomy was done on 1/25/2018.      She is doing much better since her hospitalization.  She tolerated the first cycle of FOLFIRINOX fairly well with minimal nausea and improved with Zofran and Compazine.  She did have one brief episode of vomiting and has not vomited since then.  She has been eating at least 3 meals a day with snacks and at least 64 ounces of fluids per day.  She also takes Ativan at night to help her sleep.  She states that her abdominal pain is significantly improved since she was discharged from the hospital.      She admits to some mild neuropathy and cold sensitivity which still persists, however slowly improving.  It is more in the left hand than her right hand and she is not experiencing it in her toes.  She denies any cold sensitivity in her mouth or any mouth sores.  Denies fever, chills, cough, other respiratory symptoms.  Denies bleeding, headache and vision changes, urinary symptoms, diarrhea or constipation.      EXAM:  BP 98/60 (BP Location: Right arm, Patient Position: Sitting, Cuff Size: Adult Regular)  Pulse 78  Temp 97.7  F (36.5  C) (Oral)  Resp 16  Ht 1.651 m (5' 5\")  Wt 73.2 kg (161 lb 4.8 oz)  SpO2 97%  BMI 26.84 kg/m2   Wt Readings from Last 4 Encounters:   01/31/18 73.2 kg (161 lb 4.8 oz)   01/26/18 76.2 kg (168 lb)   01/15/18 76.6 kg (168 lb 14.4 oz) "   01/12/18 76.2 kg (168 lb)     Vital signs were reviewed.   Patient alert and oriented x3.   PERRLA. EOMI. No scleral icterus noted. OP without thrush/sores.  Neck exam: No palpable cervical, supraclavicular or axillary nodes bilaterally.   Heart: RRR no murmurs noted. Port in place, old ecchymosis noted, otherwise c/d/i.  Lungs: clear to auscultation bilaterally.  No crackles or wheezing.   Abd: positive bowel sounds in all four quadrants.  No tenderness to palpation.  No hepatomegaly.   Extremities: No lower extremity edema.   Neuro: grossly intact.   Mood and affect is stable.       LABS:    1/31/2018 13:37   Sodium 137   Potassium 4.1   Chloride 104   Carbon Dioxide 27   Urea Nitrogen 21   Creatinine 0.65   GFR Estimate >90   GFR Estimate If Black >90   Calcium 8.8   Anion Gap 6   Albumin 3.4   Protein Total 7.7   Bilirubin Total 0.2   Alkaline Phosphatase 118   ALT 73 (H)   AST 26   Glucose 100 (H)   WBC 3.9 (L)   Hemoglobin 12.4   Hematocrit 38.7   Platelet Count 276   RBC Count 4.17   MCV 93   MCH 29.7   MCHC 32.0   RDW 14.4   Diff Method Manual Differential   % Neutrophils 9.1   % Lymphocytes 72.8   % Monocytes 14.5   % Eosinophils 1.8   % Basophils 1.8   Absolute Neutrophil 0.4 (LL)   Absolute Lymphocytes 2.8   Absolute Monocytes 0.6   Absolute Eosinophils 0.1   Absolute Basophils 0.1   RBC Morphology Normal   Platelet Estimate Confirming automa...       ASSESSMENT/PLAN:     1. Pancreatic cancer: She received her first cycle of FOLFIRINOX on 1/15/18 and tolerated it well with minimal nausea and cold sensitivity.  She was hospitalized for pancreatitis on 1/20/18.  Status post stent placement symptoms have significantly improved.  Cycle 2 was delayed due to hospitalization and follow-up today for reevaluation.  Continues to have residual cold sensitivity in bilateral hands, left greater than right.  Will continue to monitor this closely. ANC 0.4 and will plan for cycle 2 next week on 2/5, however would  consider Neulasta given her counts today.  She was given a mask today and recommended to use this in crowds greater than 10 people.  She was also told to call the clinic if fever greater than 100.4 or go directly to the emergency room. We also discussed using Claritin along with Neulasta support.   --2/5 with Kris Dudley and cycle 2  --2/19 with Kellie Nobles, cycle 3, Abd XR    2. 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  Continues to have some residual symptoms and will closely monitor.    3. GI pain-much improved with Creon.  She has multiple refills and will contact her pharmacy to obtain one.  GI performed gastrostomy while inpatient and they recommended abdominal x-ray 3-4 weeks after discharge and this has been ordered.  Contacted schedulers to get this on her schedule.  LFTs have significantly improved since stent placement.     4. Breast mass- noted on CT scan from 1/10.  Mammogram and ultrasound of the breast negative today.    Antonieta Lew PA-C    The patient was seen in conjunction with Antonieta Lew PA-C who served as a scribe for today's visit. I have reviewed and edited the above note, and agree with the above findings and plan.    Kellie Nobles PA-C    [facility] [address] [address] [phone]

## 2018-01-31 NOTE — NURSING NOTE
"Oncology Rooming Note    January 31, 2018 2:08 PM   Kitty Bangura is a 57 year old female who presents for:    Chief Complaint   Patient presents with     Port Draw     port accessed and labs drawn by rn.  vs taken.     Oncology Clinic Visit     Return visit related to Pancreatic Cancer     Initial Vitals: BP 98/60 (BP Location: Right arm, Patient Position: Sitting, Cuff Size: Adult Regular)  Pulse 78  Temp 97.7  F (36.5  C) (Oral)  Resp 16  Ht 1.651 m (5' 5\")  Wt 73.2 kg (161 lb 4.8 oz)  SpO2 97%  BMI 26.84 kg/m2 Estimated body mass index is 26.84 kg/(m^2) as calculated from the following:    Height as of this encounter: 1.651 m (5' 5\").    Weight as of this encounter: 73.2 kg (161 lb 4.8 oz). Body surface area is 1.83 meters squared.  No Pain (0) Comment: Data Unavailable   No LMP recorded. Patient is postmenopausal.  Allergies reviewed: Yes  Medications reviewed: Yes    Medications: Medication refills not needed today.  Pharmacy name entered into [facility]:    [id number] - [place], [place] - [address] [place] [address] [id number] - [place], [place] - [address] AT [facility] OF [address] [place] [address]    Clinical concerns: No new concerns. Provider was notified.    10 minutes for nursing intake (face to face time)     Es Seth LPN            "

## 2018-01-31 NOTE — NURSING NOTE
"Chief Complaint   Patient presents with     Port Draw     port accessed and labs drawn by rn.  vs taken.     Port accessed with 20g 3/4\" gripper needle, labs drawn, port flushed with saline and heparin, port de-accessed.  vitals checked, pt checked in for next appointment.  Abena Kunz RN    "

## 2018-02-01 RX ORDER — FLUOROURACIL 50 MG/ML
400 INJECTION, SOLUTION INTRAVENOUS ONCE
Status: CANCELLED | OUTPATIENT
Start: 2018-02-05

## 2018-02-01 RX ORDER — DIPHENHYDRAMINE HYDROCHLORIDE 50 MG/ML
50 INJECTION INTRAMUSCULAR; INTRAVENOUS
Status: CANCELLED
Start: 2018-02-05

## 2018-02-01 RX ORDER — EPINEPHRINE 0.3 MG/.3ML
0.3 INJECTION SUBCUTANEOUS EVERY 5 MIN PRN
Status: CANCELLED | OUTPATIENT
Start: 2018-02-05

## 2018-02-01 RX ORDER — LORAZEPAM 2 MG/ML
0.5 INJECTION INTRAMUSCULAR EVERY 4 HOURS PRN
Status: CANCELLED
Start: 2018-02-05

## 2018-02-01 RX ORDER — SODIUM CHLORIDE 9 MG/ML
1000 INJECTION, SOLUTION INTRAVENOUS CONTINUOUS PRN
Status: CANCELLED
Start: 2018-02-05

## 2018-02-01 RX ORDER — ALBUTEROL SULFATE 90 UG/1
1-2 AEROSOL, METERED RESPIRATORY (INHALATION)
Status: CANCELLED
Start: 2018-02-05

## 2018-02-01 RX ORDER — ALBUTEROL SULFATE 0.83 MG/ML
2.5 SOLUTION RESPIRATORY (INHALATION)
Status: CANCELLED | OUTPATIENT
Start: 2018-02-05

## 2018-02-01 RX ORDER — PALONOSETRON 0.05 MG/ML
0.25 INJECTION, SOLUTION INTRAVENOUS ONCE
Status: CANCELLED
Start: 2018-02-05

## 2018-02-01 RX ORDER — MEPERIDINE HYDROCHLORIDE 25 MG/ML
25 INJECTION INTRAMUSCULAR; INTRAVENOUS; SUBCUTANEOUS EVERY 30 MIN PRN
Status: CANCELLED | OUTPATIENT
Start: 2018-02-05

## 2018-02-01 RX ORDER — METHYLPREDNISOLONE SODIUM SUCCINATE 125 MG/2ML
125 INJECTION, POWDER, LYOPHILIZED, FOR SOLUTION INTRAMUSCULAR; INTRAVENOUS
Status: CANCELLED
Start: 2018-02-05

## 2018-02-01 RX ORDER — EPINEPHRINE 1 MG/ML
0.3 INJECTION, SOLUTION, CONCENTRATE INTRAVENOUS EVERY 5 MIN PRN
Status: CANCELLED | OUTPATIENT
Start: 2018-02-05

## 2018-02-05 ENCOUNTER — APPOINTMENT (OUTPATIENT)
Dept: LAB | Facility: CLINIC | Age: 58
End: 2018-02-05
Attending: INTERNAL MEDICINE
Payer: COMMERCIAL

## 2018-02-05 ENCOUNTER — ONCOLOGY VISIT (OUTPATIENT)
Dept: ONCOLOGY | Facility: CLINIC | Age: 58
End: 2018-02-05
Attending: PHYSICIAN ASSISTANT
Payer: COMMERCIAL

## 2018-02-05 ENCOUNTER — HOME INFUSION (PRE-WILLOW HOME INFUSION) (OUTPATIENT)
Dept: PHARMACY | Facility: CLINIC | Age: 58
End: 2018-02-05

## 2018-02-05 ENCOUNTER — INFUSION THERAPY VISIT (OUTPATIENT)
Dept: ONCOLOGY | Facility: CLINIC | Age: 58
End: 2018-02-05
Attending: INTERNAL MEDICINE
Payer: COMMERCIAL

## 2018-02-05 VITALS
HEART RATE: 77 BPM | RESPIRATION RATE: 16 BRPM | BODY MASS INDEX: 26.77 KG/M2 | HEIGHT: 65 IN | OXYGEN SATURATION: 99 % | TEMPERATURE: 97.8 F | DIASTOLIC BLOOD PRESSURE: 75 MMHG | SYSTOLIC BLOOD PRESSURE: 98 MMHG | WEIGHT: 160.7 LBS

## 2018-02-05 DIAGNOSIS — C25.9 PANCREATIC ADENOCARCINOMA (H): Primary | ICD-10-CM

## 2018-02-05 DIAGNOSIS — R07.0 THROAT PAIN: ICD-10-CM

## 2018-02-05 LAB
ALBUMIN SERPL-MCNC: 3.4 G/DL (ref 3.4–5)
ALP SERPL-CCNC: 104 U/L (ref 40–150)
ALT SERPL W P-5'-P-CCNC: 43 U/L (ref 0–50)
ANION GAP SERPL CALCULATED.3IONS-SCNC: 7 MMOL/L (ref 3–14)
AST SERPL W P-5'-P-CCNC: 22 U/L (ref 0–45)
BASOPHILS # BLD AUTO: 0 10E9/L (ref 0–0.2)
BASOPHILS NFR BLD AUTO: 0.7 %
BILIRUB SERPL-MCNC: 0.3 MG/DL (ref 0.2–1.3)
BUN SERPL-MCNC: 18 MG/DL (ref 7–30)
CALCIUM SERPL-MCNC: 9 MG/DL (ref 8.5–10.1)
CHLORIDE SERPL-SCNC: 105 MMOL/L (ref 94–109)
CO2 SERPL-SCNC: 26 MMOL/L (ref 20–32)
CREAT SERPL-MCNC: 0.59 MG/DL (ref 0.52–1.04)
DEPRECATED S PYO AG THROAT QL EIA: NORMAL
DIFFERENTIAL METHOD BLD: NORMAL
EOSINOPHIL # BLD AUTO: 0.1 10E9/L (ref 0–0.7)
EOSINOPHIL NFR BLD AUTO: 1.2 %
ERYTHROCYTE [DISTWIDTH] IN BLOOD BY AUTOMATED COUNT: 14.6 % (ref 10–15)
GFR SERPL CREATININE-BSD FRML MDRD: >90 ML/MIN/1.7M2
GLUCOSE SERPL-MCNC: 138 MG/DL (ref 70–99)
HCT VFR BLD AUTO: 39.6 % (ref 35–47)
HGB BLD-MCNC: 12.9 G/DL (ref 11.7–15.7)
IMM GRANULOCYTES # BLD: 0 10E9/L (ref 0–0.4)
IMM GRANULOCYTES NFR BLD: 0.4 %
LYMPHOCYTES # BLD AUTO: 2.7 10E9/L (ref 0.8–5.3)
LYMPHOCYTES NFR BLD AUTO: 47.3 %
MCH RBC QN AUTO: 30.5 PG (ref 26.5–33)
MCHC RBC AUTO-ENTMCNC: 32.6 G/DL (ref 31.5–36.5)
MCV RBC AUTO: 94 FL (ref 78–100)
MONOCYTES # BLD AUTO: 0.7 10E9/L (ref 0–1.3)
MONOCYTES NFR BLD AUTO: 11.5 %
NEUTROPHILS # BLD AUTO: 2.2 10E9/L (ref 1.6–8.3)
NEUTROPHILS NFR BLD AUTO: 38.9 %
NRBC # BLD AUTO: 0 10*3/UL
NRBC BLD AUTO-RTO: 0 /100
PLATELET # BLD AUTO: 257 10E9/L (ref 150–450)
POTASSIUM SERPL-SCNC: 3.8 MMOL/L (ref 3.4–5.3)
PROT SERPL-MCNC: 7.5 G/DL (ref 6.8–8.8)
RBC # BLD AUTO: 4.23 10E12/L (ref 3.8–5.2)
SODIUM SERPL-SCNC: 138 MMOL/L (ref 133–144)
SPECIMEN SOURCE: NORMAL
WBC # BLD AUTO: 5.7 10E9/L (ref 4–11)

## 2018-02-05 PROCEDURE — 25000128 H RX IP 250 OP 636: Mod: ZF | Performed by: PHYSICIAN ASSISTANT

## 2018-02-05 PROCEDURE — 96376 TX/PRO/DX INJ SAME DRUG ADON: CPT

## 2018-02-05 PROCEDURE — 96368 THER/DIAG CONCURRENT INF: CPT

## 2018-02-05 PROCEDURE — 99214 OFFICE O/P EST MOD 30 MIN: CPT | Mod: ZP | Performed by: PHYSICIAN ASSISTANT

## 2018-02-05 PROCEDURE — 85025 COMPLETE CBC W/AUTO DIFF WBC: CPT | Performed by: PHYSICIAN ASSISTANT

## 2018-02-05 PROCEDURE — 96415 CHEMO IV INFUSION ADDL HR: CPT

## 2018-02-05 PROCEDURE — 96375 TX/PRO/DX INJ NEW DRUG ADDON: CPT

## 2018-02-05 PROCEDURE — 96411 CHEMO IV PUSH ADDL DRUG: CPT

## 2018-02-05 PROCEDURE — 96417 CHEMO IV INFUS EACH ADDL SEQ: CPT

## 2018-02-05 PROCEDURE — G0463 HOSPITAL OUTPT CLINIC VISIT: HCPCS | Mod: 25

## 2018-02-05 PROCEDURE — 80053 COMPREHEN METABOLIC PANEL: CPT | Performed by: PHYSICIAN ASSISTANT

## 2018-02-05 PROCEDURE — 87081 CULTURE SCREEN ONLY: CPT | Performed by: PHYSICIAN ASSISTANT

## 2018-02-05 PROCEDURE — 96413 CHEMO IV INFUSION 1 HR: CPT

## 2018-02-05 PROCEDURE — 87880 STREP A ASSAY W/OPTIC: CPT | Performed by: PHYSICIAN ASSISTANT

## 2018-02-05 PROCEDURE — 96416 CHEMO PROLONG INFUSE W/PUMP: CPT

## 2018-02-05 PROCEDURE — 96367 TX/PROPH/DG ADDL SEQ IV INF: CPT

## 2018-02-05 PROCEDURE — G0463 HOSPITAL OUTPT CLINIC VISIT: HCPCS | Mod: ZF

## 2018-02-05 RX ORDER — FLUOROURACIL 50 MG/ML
400 INJECTION, SOLUTION INTRAVENOUS ONCE
Status: COMPLETED | OUTPATIENT
Start: 2018-02-05 | End: 2018-02-05

## 2018-02-05 RX ORDER — HEPARIN SODIUM (PORCINE) LOCK FLUSH IV SOLN 100 UNIT/ML 100 UNIT/ML
5 SOLUTION INTRAVENOUS
Status: COMPLETED | OUTPATIENT
Start: 2018-02-05 | End: 2018-02-05

## 2018-02-05 RX ORDER — PALONOSETRON 0.05 MG/ML
0.25 INJECTION, SOLUTION INTRAVENOUS ONCE
Status: COMPLETED | OUTPATIENT
Start: 2018-02-05 | End: 2018-02-05

## 2018-02-05 RX ADMIN — FLUOROURACIL 750 MG: 50 INJECTION, SOLUTION INTRAVENOUS at 14:34

## 2018-02-05 RX ADMIN — IRINOTECAN HYDROCHLORIDE 340 MG: 20 INJECTION, SOLUTION INTRAVENOUS at 12:33

## 2018-02-05 RX ADMIN — ATROPINE SULFATE 0.4 MG: 0.4 INJECTION, SOLUTION INTRAMUSCULAR; INTRAVENOUS; SUBCUTANEOUS at 13:15

## 2018-02-05 RX ADMIN — DEXAMETHASONE SODIUM PHOSPHATE 12 MG: 10 INJECTION, SOLUTION INTRAMUSCULAR; INTRAVENOUS at 09:31

## 2018-02-05 RX ADMIN — DEXTROSE 250 ML: 5 SOLUTION INTRAVENOUS at 09:31

## 2018-02-05 RX ADMIN — ATROPINE SULFATE 0.4 MG: 0.4 INJECTION, SOLUTION INTRAMUSCULAR; INTRAVENOUS; SUBCUTANEOUS at 12:26

## 2018-02-05 RX ADMIN — PALONOSETRON HYDROCHLORIDE 0.25 MG: 0.25 INJECTION INTRAVENOUS at 09:47

## 2018-02-05 RX ADMIN — OXALIPLATIN 150 MG: 5 INJECTION, SOLUTION INTRAVENOUS at 10:11

## 2018-02-05 RX ADMIN — LEUCOVORIN CALCIUM 750 MG: 350 INJECTION, POWDER, LYOPHILIZED, FOR SOLUTION INTRAMUSCULAR; INTRAVENOUS at 12:31

## 2018-02-05 RX ADMIN — SODIUM CHLORIDE, PRESERVATIVE FREE 5 ML: 5 INJECTION INTRAVENOUS at 07:37

## 2018-02-05 ASSESSMENT — PAIN SCALES - GENERAL: PAINLEVEL: NO PAIN (0)

## 2018-02-05 NOTE — PROGRESS NOTES
Oncology/Hematology Visit Note  Feb 5, 2018    Reason for Visit: Follow up of resectable pancreatic cancer    History of Present Illness: - Staging CT chest/abd/pelvis on 12/9/17 showed several small pulmonary nodules (largest 3mm), unchanged mass in the pancreatic tail, mildly prominent mesenteric nodes thought likely reactive, and small right hepatic lobe hypodensities. Abdominal MRI on 12/10/17 showed hepatic hemangiomas, no evidence of metastatic disease to the liver.   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     Kitty met with Dr Monk and discussed neoadjuvant chemotherapy with FOLFIRINOX, then surgery and then adjuvant chemotherapy. She was seen on 1/15/18 for cycle 1 and then hospitalized for pancreatitis on 1/20/18 where she was managed with IV fluds and pain control. GI took her to the OR to attempt pancreatic duct stent placement but unfortunately it was completely obstructed. They were able to place a pancreatic stent and perform an EUS cyst-gastrostomy with plans to repeat Xray a month following to ensure stent passage. She was d/c 1/27. She returns today for cycle 2     Interval History:  Mrs. Bangura returns to clinic today with her . Overall she is feeling well and her abdominal pain has continued to resolve since hospital discharge. She is continuing to use Creon and is trying to add more protein and fat to her diet to stabilize her weight. She does admit to a mild sore throat, worse three days ago, now improving, and mild rhinorrhea but no fevers, congestion, cough, or trouble swallowing. She admits her family has been sick lately but has been trying to avoid contact with them.    She states the tingling in her fingertips is no longer much of an issue, but she does still have mild cold sensitivity left hand greater than right hand. The symptoms have continued to improve the farther she gets  "from chemo and do not interfere with her ability to write, hold things, or button/zipper. She also has noted occasional \"zing\" like pain in her posterior molars with eating, and wonders if this is secondary to nerve sensitivity versus stress. She has felt more stressed recently as her prescriptions are extremely expensive, especially the Creon.    Review of Systems:  Patient denies fevers, chills, night sweats, unexplained weight changes, headaches, dizziness, vision or hearing changes, new lumps or bumps, chest pain, shortness of breath, cough, abdominal pain, nausea, vomiting, changes to bowel or bladder, swelling of extremities, bleeding issues, or rash.    Current Outpatient Prescriptions   Medication Sig Dispense Refill     Fluorouracil (ADURCIL) 5 GM/100ML SOLN        HYDROcodone-acetaminophen (NORCO) 5-325 MG per tablet   0     oxyCODONE IR (ROXICODONE) 5 MG tablet Take 1 tablet (5 mg) by mouth every 4 hours as needed for moderate to severe pain (Patient not taking: Reported on 1/31/2018) 30 tablet 0     ondansetron (ZOFRAN) 8 MG tablet Take 1 tablet (8 mg) by mouth every 8 hours as needed for nausea 30 tablet 0     LORazepam (ATIVAN) 0.5 MG tablet Take 1 tablet (0.5 mg) by mouth every 4 hours as needed (Anxiety, Nausea/Vomiting or Sleep) 30 tablet 2     prochlorperazine (COMPAZINE) 10 MG tablet Take 1 tablet (10 mg) by mouth every 6 hours as needed (Nausea/Vomiting) 30 tablet 2     amylase-lipase-protease (CREON) 12942-97086 UNITS CPEP per EC capsule Take 2-3 with meals / 1-2 with snacks, up to 15 per day. 450 capsule 6       Physical Examination:  BP 98/75 (BP Location: Right arm, Patient Position: Sitting, Cuff Size: Adult Regular)  Pulse 77  Temp 97.8  F (36.6  C) (Oral)  Resp 16  Ht 1.651 m (5' 5\")  Wt 72.9 kg (160 lb 11.2 oz)  SpO2 99%  Breastfeeding? No  BMI 26.74 kg/m2  Wt Readings from Last 10 Encounters:   01/31/18 73.2 kg (161 lb 4.8 oz)   01/26/18 76.2 kg (168 lb)   01/15/18 76.6 kg (168 " lb 14.4 oz)   01/12/18 76.2 kg (168 lb)   01/08/18 76.5 kg (168 lb 11.2 oz)   01/08/18 76.5 kg (168 lb 11.2 oz)   12/12/17 81.7 kg (180 lb 3.2 oz)   12/04/17 78.3 kg (172 lb 9.9 oz)   11/29/17 80.1 kg (176 lb 9.4 oz)   11/27/17 81.1 kg (178 lb 14.4 oz)     Constitutional: Well-appearing female in no acute distress.  Eyes: EOMI, PERRL. No scleral icterus.  ENT: Oral mucosa is moist without lesions or thrush. Mild edema of left tonsil without erythema or exudates.   Lymphatic: Neck is supple without cervical or supraclavicular lymphadenopathy.   Cardiovascular: Regular rate and rhythm. No murmurs, gallops, or rubs. No peripheral edema.  Respiratory: Clear to auscultation bilaterally. No wheezes or crackles.  Gastrointestinal: Bowel sounds present. Abdomen soft, mildly tender to palpation throughout. No palpable hepatosplenomegaly or masses.   Neurologic: Cranial nerves II through XII are grossly intact.  Skin: No rashes, petechiae, or bruising noted on exposed skin.    Laboratory Data:  Results for EZEUQIEL CHAVIS (MRN [id number]) as of 2/5/2018 08:35   2/5/2018 07:46   Sodium 138   Potassium 3.8   Chloride 105   Carbon Dioxide 26   Urea Nitrogen 18   Creatinine 0.59   GFR Estimate >90   GFR Estimate If Black >90   Calcium 9.0   Anion Gap 7   Albumin 3.4   Protein Total 7.5   Bilirubin Total 0.3   Alkaline Phosphatase 104   ALT 43   AST 22   Glucose 138 (H)   WBC 5.7   Hemoglobin 12.9   Hematocrit 39.6   Platelet Count 257   RBC Count 4.23   MCV 94   MCH 30.5   MCHC 32.6   RDW 14.6   Diff Method Automated Method   % Neutrophils 38.9   % Lymphocytes 47.3   % Monocytes 11.5   % Eosinophils 1.2   % Basophils 0.7   % Immature Granulocytes 0.4   Nucleated RBCs 0   Absolute Neutrophil 2.2   Absolute Lymphocytes 2.7   Absolute Monocytes 0.7   Absolute Eosinophils 0.1   Absolute Basophils 0.0   Abs Immature Granulocytes 0.0   Absolute Nucleated RBC 0.0         Assessment and Plan:  1. Pancreatic cancer  S/p cycle 1  FOLFIRINOX 1/15/18 which was well tolerated other than mild nausea and cold sensitivity.Unforutantely she was hospitalized for recurrent pancreatitis on 1/20/18-1/27/8. She was noted to be neutropenic at hospital follow-up and as such Neulasta was added to her regimen. ANC today 2.2. Due for cycle 2 today and labs are within treatment parameters to move forward. Will schedule for cycle 3 and abdominal Xray in 2 weeks. Overall plan is to complete 4 cycles of FOLRIRNOX before re-imaging and discussing possible surgery.     Recommended using Claritin and Tylenol for Neulasta bone pain.     2. Sore throat  Very mild sore throat x 3 days with mild rhinorrhea but no fevers. Mild tonsillar swelling (no exudates however) on exam today so will do rapid strep to r/o. Strep negative, consider supportive cares for URI.    3. Peripheral neuropathy/cold sensitivity  Improving farther out from chemo. Still having mild cold sensitivity, left greater than right, but no further numbness/tingling. No interference with activities. Also having intermittent nerve sensitivity in posterior molars. Continue to monitor closely moving forward with Oxaliplatin.     4. GI pain with recurrent pancreatitis, improved   Pain has previously imprved on Creon. Since discharge, abdominal pain has continued to improve and she is not needing any pain medications. Plan to do Xray at next appointment to ensure passage of pancreatic stent. LFTs normal today.     Will ask social work to talk to the patient about financial resources for Creon.    5. Follow-Up  2/20: [facility], Abdominal X Ray, Cycle 3 FOLFIRINOX  3/6: [facility], Cycle 4 FOLFIRINOX  3/13: Labs (CA 19-9) and CT CAP  3/19: Dr. Aleshia Dudley PA-C  [facility]  [address] [place], [place] [id number] [phone]

## 2018-02-05 NOTE — NURSING NOTE
Strep test obtained via throat swab and sent to lab for processing.    Ewelina Rodriguez CMA ([facility])

## 2018-02-05 NOTE — MR AVS SNAPSHOT
"              After Visit Summary   2/5/2018    Kitty Bangura    MRN: [id number]           Patient Information     Date Of Birth          1960        Visit Information        Provider Department      2/5/2018 8:20 AM Kris Dudley PA [facility]        Today's Diagnoses     Pancreatic adenocarcinoma (H)    -  1    Throat pain           Follow-ups after your visit        Who to contact     If you have questions or need follow up information about today's clinic visit or your schedule please contact [facility] directly at [phone].  Normal or non-critical lab and imaging results will be communicated to you by [employer], letter or phone within 4 business days after the clinic has received the results. If you do not hear from us within 7 days, please contact the clinic through [employer] or phone. If you have a critical or abnormal lab result, we will notify you by phone as soon as possible.  Submit refill requests through [employer] or call your pharmacy and they will forward the refill request to us. Please allow 3 business days for your refill to be completed.          Additional Information About Your Visit        MyChart Information     [employer] gives you secure access to your electronic health record. If you see a primary care provider, you can also send messages to your care team and make appointments. If you have questions, please call your primary care clinic.  If you do not have a primary care provider, please call [phone] and they will assist you.        Care EveryWhere ID     This is your Care EveryWhere ID. This could be used by other organizations to access your [facility] medical records  [id number]        Your Vitals Were     Pulse Temperature Respirations Height Pulse Oximetry Breastfeeding?    77 97.8  F (36.6  C) (Oral) 16 1.651 m (5' 5\") 99% No    BMI (Body Mass Index)                   26.74 kg/m2            Blood Pressure from Last 3 Encounters: "   02/05/18 98/75   01/31/18 98/60   01/27/18 118/71    Weight from Last 3 Encounters:   02/05/18 72.9 kg (160 lb 11.2 oz)   01/31/18 73.2 kg (161 lb 4.8 oz)   01/26/18 76.2 kg (168 lb)              We Performed the Following     Beta strep group A culture     Rapid strep screen        Primary Care Provider Office Phone # Fax #    Solange Julito Mcgill [phone] [address] [facility] [address]        Equal Access to Services     [facility] : Hadii zaheer guerrero hadashnina Sopaulo, waaxda lukameron, qaybta kaalmada marty, re gerardo . So [facility] [phone].    ATENCIÓN: Si habla español, tiene a quiros disposición servicios gratuitos de asistencia lingüística. [facility] [phone].    We comply with applicable federal civil rights laws and [place] laws. We do not discriminate on the basis of race, color, national origin, age, disability, sex, sexual orientation, or gender identity.            Thank you!     Thank you for choosing [facility]  for your care. Our goal is always to provide you with excellent care. Hearing back from our patients is one way we can continue to improve our services. Please take a few minutes to complete the written survey that you may receive in the mail after your visit with us. Thank you!             Your Updated Medication List - Protect others around you: Learn how to safely use, store and throw away your medicines at www.disposemymeds.org.          This list is accurate as of 2/5/18 10:29 AM.  Always use your most recent med list.                   Brand Name Dispense Instructions for use Diagnosis    amylase-lipase-protease [id number] UNITS Cpep per EC capsule    CREON    450 capsule    Take 2-3 with meals / 1-2 with snacks, up to 15 per day.    Necrotizing pancreatitis       Fluorouracil 5 GM/100ML Soln    ADURCIL          HYDROcodone-acetaminophen 5-325 MG per tablet    NORCO          LORazepam 0.5 MG  tablet    ATIVAN    30 tablet    Take 1 tablet (0.5 mg) by mouth every 4 hours as needed (Anxiety, Nausea/Vomiting or Sleep)    Pancreatic adenocarcinoma (H)       ondansetron 8 MG tablet    ZOFRAN    30 tablet    Take 1 tablet (8 mg) by mouth every 8 hours as needed for nausea    Pancreatic adenocarcinoma (H)       oxyCODONE IR 5 MG tablet    ROXICODONE    30 tablet    Take 1 tablet (5 mg) by mouth every 4 hours as needed for moderate to severe pain    Necrotizing pancreatitis, Pancreatic adenocarcinoma (H)       prochlorperazine 10 MG tablet    COMPAZINE    30 tablet    Take 1 tablet (10 mg) by mouth every 6 hours as needed (Nausea/Vomiting)    Pancreatic adenocarcinoma (H)

## 2018-02-05 NOTE — Clinical Note
2/5/2018       RE: Kitty Bangura  [address] [facility] [address]     Dear Colleague,    Thank you for referring your patient, Kitty Bangura, to the [facility]. Please see a copy of my visit note below.    Oncology/Hematology Visit Note  Feb 5, 2018    Reason for Visit: Follow up of resectable pancreatic cancer    History of Present Illness: - Staging CT chest/abd/pelvis on 12/9/17 showed several small pulmonary nodules (largest 3mm), unchanged mass in the pancreatic tail, mildly prominent mesenteric nodes thought likely reactive, and small right hepatic lobe hypodensities. Abdominal MRI on 12/10/17 showed hepatic hemangiomas, no evidence of metastatic disease to the liver.   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     Kitty met with Dr Monk and discussed neoadjuvant chemotherapy with FOLFIRINOX, then surgery and then adjuvant chemotherapy. She was seen on 1/15/18 for cycle 1 and then hospitalized for pancreatitis on 1/20/18 where she was managed with IV fluds and pain control. GI took her to the OR to attempt pancreatic duct stent placement but unfortunately it was completely obstructed. They were able to place a pancreatic stent and perform an EUS cyst-gastrostomy with plans to repeat Xray a month following to ensure stent passage. She was d/c 1/27. She returns today for cycle 2     Interval History:  Mrs. Bangura returns to clinic today with her . Overall she is feeling well and her abdominal pain has continued to resolve since hospital discharge. She is continuing to use Creon and is trying to add more protein and fat to her diet to stabilize her weight. She does admit to a mild sore throat, worse three days ago, now improving, and mild rhinorrhea but no fevers, congestion, cough, or trouble swallowing. She admits her family has been sick lately but has been trying to  "avoid contact with them.    She states the tingling in her fingertips is no longer much of an issue, but she does still have mild cold sensitivity left hand greater than right hand. The symptoms have continued to improve the farther she gets from chemo and do not interfere with her ability to write, hold things, or button/zipper. She also has noted occasional \"zing\" like pain in her posterior molars with eating, and wonders if this is secondary to nerve sensitivity versus stress. She has felt more stressed recently as her prescriptions are extremely expensive, especially the Creon.    Review of Systems:  Patient denies fevers, chills, night sweats, unexplained weight changes, headaches, dizziness, vision or hearing changes, new lumps or bumps, chest pain, shortness of breath, cough, abdominal pain, nausea, vomiting, changes to bowel or bladder, swelling of extremities, bleeding issues, or rash.    Current Outpatient Prescriptions   Medication Sig Dispense Refill     Fluorouracil (ADURCIL) 5 GM/100ML SOLN        HYDROcodone-acetaminophen (NORCO) 5-325 MG per tablet   0     oxyCODONE IR (ROXICODONE) 5 MG tablet Take 1 tablet (5 mg) by mouth every 4 hours as needed for moderate to severe pain (Patient not taking: Reported on 1/31/2018) 30 tablet 0     ondansetron (ZOFRAN) 8 MG tablet Take 1 tablet (8 mg) by mouth every 8 hours as needed for nausea 30 tablet 0     LORazepam (ATIVAN) 0.5 MG tablet Take 1 tablet (0.5 mg) by mouth every 4 hours as needed (Anxiety, Nausea/Vomiting or Sleep) 30 tablet 2     prochlorperazine (COMPAZINE) 10 MG tablet Take 1 tablet (10 mg) by mouth every 6 hours as needed (Nausea/Vomiting) 30 tablet 2     amylase-lipase-protease (CREON) [id number] UNITS CPEP per EC capsule Take 2-3 with meals / 1-2 with snacks, up to 15 per day. 450 capsule 6       Physical Examination:  BP 98/75 (BP Location: Right arm, Patient Position: Sitting, Cuff Size: Adult Regular)  Pulse 77  Temp 97.8  F (36.6  C) " "(Oral)  Resp 16  Ht 1.651 m (5' 5\")  Wt 72.9 kg (160 lb 11.2 oz)  SpO2 99%  Breastfeeding? No  BMI 26.74 kg/m2  Wt Readings from Last 10 Encounters:   01/31/18 73.2 kg (161 lb 4.8 oz)   01/26/18 76.2 kg (168 lb)   01/15/18 76.6 kg (168 lb 14.4 oz)   01/12/18 76.2 kg (168 lb)   01/08/18 76.5 kg (168 lb 11.2 oz)   01/08/18 76.5 kg (168 lb 11.2 oz)   12/12/17 81.7 kg (180 lb 3.2 oz)   12/04/17 78.3 kg (172 lb 9.9 oz)   11/29/17 80.1 kg (176 lb 9.4 oz)   11/27/17 81.1 kg (178 lb 14.4 oz)     Constitutional: Well-appearing female in no acute distress.  Eyes: EOMI, PERRL. No scleral icterus.  ENT: Oral mucosa is moist without lesions or thrush. Mild edema of left tonsil without erythema or exudates.   Lymphatic: Neck is supple without cervical or supraclavicular lymphadenopathy.   Cardiovascular: Regular rate and rhythm. No murmurs, gallops, or rubs. No peripheral edema.  Respiratory: Clear to auscultation bilaterally. No wheezes or crackles.  Gastrointestinal: Bowel sounds present. Abdomen soft, mildly tender to palpation throughout. No palpable hepatosplenomegaly or masses.   Neurologic: Cranial nerves II through XII are grossly intact.  Skin: No rashes, petechiae, or bruising noted on exposed skin.    Laboratory Data:  Results for EZEQUIEL CHAVIS (MRN [id number]) as of 2/5/2018 08:35   2/5/2018 07:46   Sodium 138   Potassium 3.8   Chloride 105   Carbon Dioxide 26   Urea Nitrogen 18   Creatinine 0.59   GFR Estimate >90   GFR Estimate If Black >90   Calcium 9.0   Anion Gap 7   Albumin 3.4   Protein Total 7.5   Bilirubin Total 0.3   Alkaline Phosphatase 104   ALT 43   AST 22   Glucose 138 (H)   WBC 5.7   Hemoglobin 12.9   Hematocrit 39.6   Platelet Count 257   RBC Count 4.23   MCV 94   MCH 30.5   MCHC 32.6   RDW 14.6   Diff Method Automated Method   % Neutrophils 38.9   % Lymphocytes 47.3   % Monocytes 11.5   % Eosinophils 1.2   % Basophils 0.7   % Immature Granulocytes 0.4   Nucleated RBCs 0   Absolute Neutrophil " 2.2   Absolute Lymphocytes 2.7   Absolute Monocytes 0.7   Absolute Eosinophils 0.1   Absolute Basophils 0.0   Abs Immature Granulocytes 0.0   Absolute Nucleated RBC 0.0         Assessment and Plan:  1. Pancreatic cancer  S/p cycle 1 FOLFIRINOX 1/15/18 which was well tolerated other than mild nausea and cold sensitivity.Unforutantely she was hospitalized for recurrent pancreatitis on 1/20/18-1/27/8. She was noted to be neutropenic at hospital follow-up and as such Neulasta was added to her regimen. ANC today 2.2. Due for cycle 2 today and labs are within treatment parameters to move forward. Will schedule for cycle 3 and abdominal Xray in 2 weeks. Overall plan is to complete 4 cycles of FOLRIRNOX before re-imaging and discussing possible surgery.     Recommended using Claritin and Tylenol for Neulasta bone pain.     2. Sore throat  Very mild sore throat x 3 days with mild rhinorrhea but no fevers. Mild tonsillar swelling (no exudates however) on exam today so will do rapid strep to r/o. Strep negative, consider supportive cares for URI.    3. Peripheral neuropathy/cold sensitivity  Improving farther out from chemo. Still having mild cold sensitivity, left greater than right, but no further numbness/tingling. No interference with activities. Also having intermittent nerve sensitivity in posterior molars. Continue to monitor closely moving forward with Oxaliplatin.     4. GI pain with recurrent pancreatitis, improved   Pain has previously imprved on Creon. Since discharge, abdominal pain has continued to improve and she is not needing any pain medications. Plan to do Xray at next appointment to ensure passage of pancreatic stent. LFTs normal today.     Will ask social work to talk to the patient about financial resources for Creon.    5. Follow-Up  2/20: Labs, Kellie, Abdominal X Ray, Cycle 3 FOLFIRINOX  3/6: Labs, Kellie, Cycle 4 FOLFIRINOX  3/13: Labs (CA 19-9) and CT CAP  3/19: Dr. Aleshia Dudley  [facility]  [facility] [address] [address]  [phone]      Again, thank you for allowing me to participate in the care of your patient.      Sincerely,    DANTE Junior

## 2018-02-05 NOTE — MR AVS SNAPSHOT
After Visit Summary   2/5/2018    Kitty Bangura    MRN: [id number]           Patient Information     Date Of Birth          1960        Visit Information        Provider Department      2/5/2018 9:30 AM  22 ATC;  ONCOLOGY INFUSION [facility]        Today's Diagnoses     Pancreatic adenocarcinoma (H)    -  1      Care Instructions    Prescription Assistance: Rosa Cabello [phone]      Contact Numbers    [facility] Main Line: [phone]  [facility] Triage:  [phone]    Call triage with chills and/or temperature greater than or equal to 100.5, uncontrolled nausea/vomiting, diarrhea, constipation, dizziness, shortness of breath, chest pain, bleeding, unexplained bruising, or any new/concerning symptoms, questions/concerns.     If you are having any concerning symptoms or wish to speak to a provider before your next infusion visit, please call your care coordinator or triage to notify them so we can adequately serve you.       After Hours: [phone]    If after hours, weekends, or holidays, call main hospital  and ask for Oncology doctor on call.         February 2018 Sunday Monday Tuesday Wednesday Thursday Friday Saturday                       1     2     3       4     5     P [facility] LAB DRAW    7:45 AM   (15 min.)    [facility] LAB DRAW   [facility] Lab Draw     P RETURN    8:05 AM   (50 min.)   Kris Dudley [facility]   [facility]    9:30 AM   (360 min.)   UC ONCOLOGY INFUSION   [facility] 6     7     8     9     10       11     12     13     14     15     16     17       18     19     20     P [facility] DRAW    6:30 AM   (15 min.)    [facility] DRAW   [facility] Lab Draw     [facility] ONC INFUSION 360    7:00 AM   (360 min.)    ONCOLOGY INFUSION   [facility]     UMP RETURN   11:55 AM   (50 min.)   Kellie Nobles PA-C   [facility]  Clinic     XR ABDOMEN 2 VIEWS    2:00 PM   (15 min.)   UCXR1   [facility] Xray 21     22     23     24       25     26     27     28 March 2018 Sunday Monday Tuesday Wednesday Thursday Friday Saturday                       1     2     3       4     5     6     [facility] DRAW    9:00 AM   (15 min.)    [facility] LAB DRAW   [facility] Lab Draw     UMP RETURN    9:15 AM   (50 min.)   Kellie Nobles PA-C   [facility]     UMP ONC INFUSION 360   11:30 AM   (360 min.)   UC ONCOLOGY INFUSION   [facility] 7     8     9     10       11     12     13     CT CHEST/ABDOMEN/PELVIS W    9:45 AM   (20 min.)   UCCT2   [facility] CT 14     15     16     17       18     19     UMP RETURN    7:30 AM   (30 min.)   Jovany Monk MD   [facility] 20     21     22     23     24       25     26     27     28     29     30     31                 Recent Results (from the past 24 hour(s))   CBC with platelets differential    Collection Time: 02/05/18  7:46 AM   Result Value Ref Range    WBC 5.7 4.0 - 11.0 10e9/L    RBC Count 4.23 3.8 - 5.2 10e12/L    Hemoglobin 12.9 11.7 - 15.7 g/dL    Hematocrit 39.6 35.0 - 47.0 %    MCV 94 78 - 100 fl    MCH 30.5 26.5 - 33.0 pg    MCHC 32.6 31.5 - 36.5 g/dL    RDW 14.6 10.0 - 15.0 %    Platelet Count 257 150 - 450 10e9/L    Diff Method Automated Method     % Neutrophils 38.9 %    % Lymphocytes 47.3 %    % Monocytes 11.5 %    % Eosinophils 1.2 %    % Basophils 0.7 %    % Immature Granulocytes 0.4 %    Nucleated RBCs 0 0 /100    Absolute Neutrophil 2.2 1.6 - 8.3 10e9/L    Absolute Lymphocytes 2.7 0.8 - 5.3 10e9/L    Absolute Monocytes 0.7 0.0 - 1.3 10e9/L    Absolute Eosinophils 0.1 0.0 - 0.7 10e9/L    Absolute Basophils 0.0 0.0 - 0.2 10e9/L    Abs Immature Granulocytes 0.0 0 - 0.4 10e9/L    Absolute Nucleated RBC 0.0    Comprehensive metabolic panel    Collection Time: 02/05/18   7:46 AM   Result Value Ref Range    Sodium 138 133 - 144 mmol/L    Potassium 3.8 3.4 - 5.3 mmol/L    Chloride 105 94 - 109 mmol/L    Carbon Dioxide 26 20 - 32 mmol/L    Anion Gap 7 3 - 14 mmol/L    Glucose 138 (H) 70 - 99 mg/dL    Urea Nitrogen 18 7 - 30 mg/dL    Creatinine 0.59 0.52 - 1.04 mg/dL    GFR Estimate >90 >60 mL/min/1.7m2    GFR Estimate If Black >90 >60 mL/min/1.7m2    Calcium 9.0 8.5 - 10.1 mg/dL    Bilirubin Total 0.3 0.2 - 1.3 mg/dL    Albumin 3.4 3.4 - 5.0 g/dL    Protein Total 7.5 6.8 - 8.8 g/dL    Alkaline Phosphatase 104 40 - 150 U/L    ALT 43 0 - 50 U/L    AST 22 0 - 45 U/L   Rapid strep screen    Collection Time: 02/05/18  8:43 AM   Result Value Ref Range    Specimen Description Throat     Rapid Strep A Screen       NEGATIVE: No Group A streptococcal antigen detected by immunoassay, await culture report.   Beta strep group A culture    Collection Time: 02/05/18  8:43 AM   Result Value Ref Range    Specimen Description Throat     Special Requests Specimen collected in eSwab transport (white cap)     Culture Micro PENDING                  Follow-ups after your visit        Your next 10 appointments already scheduled     Feb 20, 2018  6:30 AM CST   [facility] Draw with [facility] DRAW   [facility] Lab Draw ([facility])    [address] [place] [address]  [address]  [facility] [id number] [phone]            Feb 20, 2018  7:00 AM CST   Infusion 360 with  ONCOLOGY INFUSION, UC 10 ATC   [facility] ([facility])    [address] [place] [address]  [address]  [facility] [address] [phone]            Feb 20, 2018 12:10 PM CST   (Arrive by 11:55 AM)   Return Visit with Kellie Nobles PA-C   [facility] ([facility])    [address] [place] [address]  [address]  [facility] [address] [phone]            Feb 20, 2018  2:00 PM CST   XR ABDOMEN 2 VIEWS with UCXR1   [facility] Xray ([facility])    [address]  1st Floor  [facility] [address]   [phone]           Please bring a list of your current medicines to your exam. (Include vitamins, minerals and over-thecounter medicines.) Leave your valuables at home.  Tell your doctor if there is a chance you may be pregnant.  You do not need to do anything special for this exam.            Mar 06, 2018  9:00 AM CST   [facility] Lab Draw with UC [facility] LAB DRAW   [facility] Lab Draw ([facility])    [address]  [address]  [facility] [address]   [phone]            Mar 06, 2018  9:30 AM CST   (Arrive by 9:15 AM)   Return Visit with Kellie Nobles PA-C   [facility] ([facility])    [address] [place] [address]  [address]  [facility] [address]   [phone]            Mar 06, 2018 11:30 AM CST   Infusion 360 with  ONCOLOGY INFUSION, UC 13 ATC   [facility] ([facility])    [address] [place] [address]  [address]  [facility] [address]   [phone]            Mar 13, 2018 10:00 AM CDT   (Arrive by 9:45 AM)   CT CHEST/ABDOMEN/PELVIS W CONTRAST with UCCT2   [facility] [place] [facility] ([facility])    [address] [place] [address]  [address] [facility] [address]   [phone]           Please bring any scans or X-rays taken at other hospitals, if similar tests were done. Also bring a list of your medicines, including vitamins, minerals and over-the-counter drugs. It is safest to leave personal items at home.  Be sure to tell your doctor:   If you have any allergies.   If there s any chance you are pregnant.   If you are breastfeeding.   If you have any special needs.  You may have contrast for this exam. To prepare:   Do not eat or drink for 2 hours before your exam. If you need to take medicine, you may take it with small sips of water. (We may  ask you to take liquid medicine as well.)   The day before your exam, drink extra fluids at least six 8-ounce glasses (unless your doctor tells you to restrict your fluids).  Patients over 70 or patients with diabetes or kidney problems:   If you haven t had a blood test (creatinine test) within the last 30 days, go to your clinic or Diagnostic Imaging Department for this test.  If you have diabetes:   If your kidney function is normal, continue taking your metformin (Avandamet, Glucophage, Glucovance, Metaglip) on the day of your exam.   If your kidney function is abnormal, wait 48 hours before restarting this medicine.  You will have oral contrast for this exam:   You will drink the contrast at home. Get this from your clinic or Diagnostic Imaging Department. Please follow the directions given.  Please wear loose clothing, such as a sweat suit or jogging clothes. Avoid snaps, zippers and other metal. We may ask you to undress and put on a hospital gown.  If you have any questions, please call the Imaging Department where you will have your exam.              Who to contact     If you have questions or need follow up information about today's clinic visit or your schedule please contact [facility] directly at [phone].  Normal or non-critical lab and imaging results will be communicated to you by [employer], letter or phone within 4 business days after the clinic has received the results. If you do not hear from us within 7 days, please contact the clinic through [employer] or phone. If you have a critical or abnormal lab result, we will notify you by phone as soon as possible.  Submit refill requests through [employer] or call your pharmacy and they will forward the refill request to us. Please allow 3 business days for your refill to be completed.          Additional Information About Your Visit        [employer] Information     [employer] gives you secure access to your electronic health record. If you  see a primary care provider, you can also send messages to your care team and make appointments. If you have questions, please call your primary care clinic.  If you do not have a primary care provider, please call [phone] and they will assist you.        Care EveryWhere ID     This is your Care EveryWhere ID. This could be used by other organizations to access your [place] medical records  [id number]         Blood Pressure from Last 3 Encounters:   02/05/18 98/75   01/31/18 98/60   01/27/18 118/71    Weight from Last 3 Encounters:   02/05/18 72.9 kg (160 lb 11.2 oz)   01/31/18 73.2 kg (161 lb 4.8 oz)   01/26/18 76.2 kg (168 lb)              We Performed the Following     CBC with platelets differential     Comprehensive metabolic panel        Primary Care Provider Office Phone # Fax #    Solange Mcgill [phone] [address] [address]        Equal Access to Services     CHASE DE GUZMAN : Hadii aad ku hadasho Soomaali, waaxda luqadaha, qaybta kaalmada adeegyada, waxay ridge gerardo . So [facility] [phone].    ATENCIÓN: Si habla español, tiene a quiros disposición servicios gratuitos de asistencia lingüística. Llame al [phone].    We comply with applicable federal civil rights laws and [place] laws. We do not discriminate on the basis of race, color, national origin, age, disability, sex, sexual orientation, or gender identity.            Thank you!     Thank you for choosing [facility]  for your care. Our goal is always to provide you with excellent care. Hearing back from our patients is one way we can continue to improve our services. Please take a few minutes to complete the written survey that you may receive in the mail after your visit with us. Thank you!             Your Updated Medication List - Protect others around you: Learn how to safely use, store and throw away your medicines at www.disposemymeds.org.           This list is accurate as of 2/5/18  5:39 PM.  Always use your most recent med list.                   Brand Name Dispense Instructions for use Diagnosis    amylase-lipase-protease [id number] UNITS Cpep per EC capsule    CREON    450 capsule    Take 2-3 with meals / 1-2 with snacks, up to 15 per day.    Necrotizing pancreatitis       Fluorouracil 5 GM/100ML Soln    ADURCIL          HYDROcodone-acetaminophen 5-325 MG per tablet    NORCO          LORazepam 0.5 MG tablet    ATIVAN    30 tablet    Take 1 tablet (0.5 mg) by mouth every 4 hours as needed (Anxiety, Nausea/Vomiting or Sleep)    Pancreatic adenocarcinoma (H)       ondansetron 8 MG tablet    ZOFRAN    30 tablet    Take 1 tablet (8 mg) by mouth every 8 hours as needed for nausea    Pancreatic adenocarcinoma (H)       oxyCODONE IR 5 MG tablet    ROXICODONE    30 tablet    Take 1 tablet (5 mg) by mouth every 4 hours as needed for moderate to severe pain    Necrotizing pancreatitis, Pancreatic adenocarcinoma (H)       prochlorperazine 10 MG tablet    COMPAZINE    30 tablet    Take 1 tablet (10 mg) by mouth every 6 hours as needed (Nausea/Vomiting)    Pancreatic adenocarcinoma (H)

## 2018-02-05 NOTE — NURSING NOTE
"Oncology Rooming Note    February 5, 2018 7:57 AM   Kitty Bangura is a 57 year old female who presents for:    Chief Complaint   Patient presents with     Port Draw     labs drawn from port by rn.  vs taken     Oncology Clinic Visit     return patient visit for pre-chemo follow up related to pancreatic cancer     Initial Vitals: BP 98/75 (BP Location: Right arm, Patient Position: Sitting, Cuff Size: Adult Regular)  Pulse 77  Temp 97.8  F (36.6  C) (Oral)  Resp 16  Ht 1.651 m (5' 5\")  Wt 72.9 kg (160 lb 11.2 oz)  SpO2 99%  Breastfeeding? No  BMI 26.74 kg/m2 Estimated body mass index is 26.74 kg/(m^2) as calculated from the following:    Height as of this encounter: 1.651 m (5' 5\").    Weight as of this encounter: 72.9 kg (160 lb 11.2 oz). Body surface area is 1.83 meters squared.  No Pain (0) Comment: Data Unavailable   No LMP recorded. Patient is postmenopausal.  Allergies reviewed: Yes  Medications reviewed: Yes    Medications: MEDICATION REFILLS NEEDED TODAY. Provider was notified.  Pharmacy name entered into [employer]:    [id number] - [place], [place] - [address] [place] [address] [id number] - [place], [place] - [address] AT [address]    Clinical concerns: no concerns kishan shipley  was notified.    6 minutes for nursing intake (face to face time)     Brain Syed CMA              "

## 2018-02-05 NOTE — PROGRESS NOTES
Infusion Nursing Note:  Kitty Bangura presents today for Cycle 2 Day 1 Oxaliplatin, Irinotecan, Leucovorin, Fluorouracil bolus and Fluorouracil pump connection.     Patient seen by provider today: Yes: DANTE Junior    Note: Rapid Strep screen negative. Patient reports that she had cold sensitivity in her fingertips for several days post infusion. Educated on cold sensitivity precautions-drink room temperature fluids, wear gloves, scarf, hat outside, use , limit cold exposure. Verbalized understanding.     [facility] 2/5/18 0915 Kris HOWELL/Venus Coronado RN: Ok to treat today.      1212: Patient completed Oxaliplatin infusion. Reported sharp sensation to back molars while drinking cranberry juice. Denied SOB, tingling in lips, itching. Resolved on own with tea. No other complaints.     Atropine given prior to Irinotecan due to history of runny nose and abdominal cramping with Irinotecan administration.     1308: Patient c/o abdominal cramping. Irinotecan stopped. Heat pack applied to abdomen. Second dose of atropine given. Irinotecan restarted. Patient tolerated remainder of infusion with no complaints.     Patient given handout on Neulasta OnBody. Patient states she is planning on taking Loratadine. Per Pharmacist Nomi, insurance should cover Neulasta to be given at pump disconnect on 2/7/18. Patient informed. No other concerns at this time.     Intravenous Access:  Implanted Port.    Treatment Conditions:  Lab Results   Component Value Date    HGB 12.9 02/05/2018     Lab Results   Component Value Date    WBC 5.7 02/05/2018      Lab Results   Component Value Date    ANEU 2.2 02/05/2018     Lab Results   Component Value Date     02/05/2018      Lab Results   Component Value Date     02/05/2018                   Lab Results   Component Value Date    POTASSIUM 3.8 02/05/2018           Lab Results   Component Value Date    MAG 1.9 01/27/2018            Lab Results   Component Value  "Date    CR 0.59 02/05/2018                   Lab Results   Component Value Date    ILIANA 9.0 02/05/2018                Lab Results   Component Value Date    BILITOTAL 0.3 02/05/2018           Lab Results   Component Value Date    ALBUMIN 3.4 02/05/2018                    Lab Results   Component Value Date    ALT 43 02/05/2018           Lab Results   Component Value Date    AST 22 02/05/2018     Results reviewed, labs MET treatment parameters, ok to proceed with treatment.      Post Infusion Assessment:  Patient tolerated infusion. See note above.   Blood return noted pre and post infusion.  Blood return noted during Fluorouracil bolus administration every 2 cc.  Site patent and intact, free from redness, edema or discomfort.  No evidence of extravasations.    Prior to discharge: Port is secured in place with tegaderm and flushed with 10cc NS with positive blood return noted.  Continuous home infusion Dosi-Fuser pump connected.    All connectors secured in place and clamps taped open.    Pump started, \"running\" noted on display (CADD): Not Applicable.  Patient instructed to call our clinic or [facility] with any questions or concerns at home.  Patient verbalized understanding.    Patient set up for pump disconnect and Neulasta OnBody at [facility] on 2/7/18 at 1230.    Discharge Plan:   Prescription refills given for Dexamethasone, Loratidine.  Discharge instructions reviewed with: Patient and Family.  Patient and family verbalized understanding of discharge instructions and all questions answered.  AVS to patient via [employer].  Patient will return 2/7/18 for pump disconnect appointment.   Patient discharged in stable condition accompanied by: .  Face to Face time: 2.    FUNMILAYO RAMON RN                    "

## 2018-02-05 NOTE — PATIENT INSTRUCTIONS
Prescription Assistance: Rosa Cabello [phone]      Contact Numbers    [facility] [place] [phone]  [facility] [place]:  [phone]    Call triage with chills and/or temperature greater than or equal to 100.5, uncontrolled nausea/vomiting, diarrhea, constipation, dizziness, shortness of breath, chest pain, bleeding, unexplained bruising, or any new/concerning symptoms, questions/concerns.     If you are having any concerning symptoms or wish to speak to a provider before your next infusion visit, please call your care coordinator or triage to notify them so we can adequately serve you.       After Hours: [phone]    If after hours, weekends, or holidays, call main hospital  and ask for Oncology doctor on call.         February 2018 Sunday Monday Tuesday Wednesday Thursday Friday Saturday                       1     2     3       4     5     P [facility] LAB DRAW    7:45 AM   (15 min.)    [facility] LAB DRAW   [facility] Lab Draw     UMP RETURN    8:05 AM   (50 min.)   Kris Dudley PA   [facility]     UMP ONC INFUSION 360    9:30 AM   (360 min.)   UC ONCOLOGY INFUSION   [facility] 6     7     8     9     10       11     12     13     14     15     16     17       18     19     20     [facility] LAB DRAW    6:30 AM   (15 min.)    [facility] LAB DRAW   [facility] Lab Draw     P ONC INFUSION 360    7:00 AM   (360 min.)   UC ONCOLOGY INFUSION   [facility]     UMP RETURN   11:55 AM   (50 min.)   Kellie Nobles PA-C   [facility]     XR ABDOMEN 2 VIEWS    2:00 PM   (15 min.)   UCXR1   [facility] Xray 21     22     23     24       25     26     27     28 March 2018 Sunday Monday Tuesday Wednesday Thursday Friday Saturday                       1     2     3       4     5     6     [facility] DRAW    9:00 AM   (15 min.)    [facility] LAB DRAW   [facility]  Lab Draw     UMP RETURN    9:15 AM   (50 min.)   Kell[facility] Nobles [facility]-C   [facility]   11:30 AM   (360 min.)   UC ONCOLOGY INFUSION   [facility] 7     8     9     10       11     12     13     CT CHEST/ABDOMEN/PELVIS W    9:45 AM   (20 min.)   UCCT2   [facility] CT 14     15     16     17       18     19     [facility] RETURN    7:30 AM   (30 min.)   Jovany Monk MD   [facility] 20     21     22     23     24       25     26     27     28     29     30     31                 Recent Results (from the past 24 hour(s))   CBC with platelets differential    Collection Time: 02/05/18  7:46 AM   Result Value Ref Range    WBC 5.7 4.0 - 11.0 10e9/L    RBC Count 4.23 3.8 - 5.2 10e12/L    Hemoglobin 12.9 11.7 - 15.7 g/dL    Hematocrit 39.6 35.0 - 47.0 %    MCV 94 78 - 100 fl    MCH 30.5 26.5 - 33.0 pg    MCHC 32.6 31.5 - 36.5 g/dL    RDW 14.6 10.0 - 15.0 %    Platelet Count 257 150 - 450 10e9/L    Diff Method Automated Method     % Neutrophils 38.9 %    % Lymphocytes 47.3 %    % Monocytes 11.5 %    % Eosinophils 1.2 %    % Basophils 0.7 %    % Immature Granulocytes 0.4 %    Nucleated RBCs 0 0 /100    Absolute Neutrophil 2.2 1.6 - 8.3 10e9/L    Absolute Lymphocytes 2.7 0.8 - 5.3 10e9/L    Absolute Monocytes 0.7 0.0 - 1.3 10e9/L    Absolute Eosinophils 0.1 0.0 - 0.7 10e9/L    Absolute Basophils 0.0 0.0 - 0.2 10e9/L    Abs Immature Granulocytes 0.0 0 - 0.4 10e9/L    Absolute Nucleated RBC 0.0    Comprehensive metabolic panel    Collection Time: 02/05/18  7:46 AM   Result Value Ref Range    Sodium 138 133 - 144 mmol/L    Potassium 3.8 3.4 - 5.3 mmol/L    Chloride 105 94 - 109 mmol/L    Carbon Dioxide 26 20 - 32 mmol/L    Anion Gap 7 3 - 14 mmol/L    Glucose 138 (H) 70 - 99 mg/dL    Urea Nitrogen 18 7 - 30 mg/dL    Creatinine 0.59 0.52 - 1.04 mg/dL    GFR Estimate >90 >60 mL/min/1.7m2    GFR Estimate If Black >90 >60 mL/min/1.7m2     Calcium 9.0 8.5 - 10.1 mg/dL    Bilirubin Total 0.3 0.2 - 1.3 mg/dL    Albumin 3.4 3.4 - 5.0 g/dL    Protein Total 7.5 6.8 - 8.8 g/dL    Alkaline Phosphatase 104 40 - 150 U/L    ALT 43 0 - 50 U/L    AST 22 0 - 45 U/L   Rapid strep screen    Collection Time: 02/05/18  8:43 AM   Result Value Ref Range    Specimen Description Throat     Rapid Strep A Screen       NEGATIVE: No Group A streptococcal antigen detected by immunoassay, await culture report.   Beta strep group A culture    Collection Time: 02/05/18  8:43 AM   Result Value Ref Range    Specimen Description Throat     Special Requests Specimen collected in eSwab transport (white cap)     Culture Micro PENDING

## 2018-02-05 NOTE — LETTER
2/5/2018      RE: Kitty Bangura  [address] [facility] [address]       Oncology/Hematology Visit Note  Feb 5, 2018    Reason for Visit: Follow up of resectable pancreatic cancer    History of Present Illness: - Staging CT chest/abd/pelvis on 12/9/17 showed several small pulmonary nodules (largest 3mm), unchanged mass in the pancreatic tail, mildly prominent mesenteric nodes thought likely reactive, and small right hepatic lobe hypodensities. Abdominal MRI on 12/10/17 showed hepatic hemangiomas, no evidence of metastatic disease to the liver.   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     Kitty met with Dr Monk and discussed neoadjuvant chemotherapy with FOLFIRINOX, then surgery and then adjuvant chemotherapy. She was seen on 1/15/18 for cycle 1 and then hospitalized for pancreatitis on 1/20/18 where she was managed with IV fluds and pain control. GI took her to the OR to attempt pancreatic duct stent placement but unfortunately it was completely obstructed. They were able to place a pancreatic stent and perform an EUS cyst-gastrostomy with plans to repeat Xray a month following to ensure stent passage. She was d/c 1/27. She returns today for cycle 2     Interval History:  Mrs. Bangura returns to clinic today with her . Overall she is feeling well and her abdominal pain has continued to resolve since hospital discharge. She is continuing to use Creon and is trying to add more protein and fat to her diet to stabilize her weight. She does admit to a mild sore throat, worse three days ago, now improving, and mild rhinorrhea but no fevers, congestion, cough, or trouble swallowing. She admits her family has been sick lately but has been trying to avoid contact with them.    She states the tingling in her fingertips is no longer much of an issue, but she does still have mild cold sensitivity left hand  "greater than right hand. The symptoms have continued to improve the farther she gets from chemo and do not interfere with her ability to write, hold things, or button/zipper. She also has noted occasional \"zing\" like pain in her posterior molars with eating, and wonders if this is secondary to nerve sensitivity versus stress. She has felt more stressed recently as her prescriptions are extremely expensive, especially the Creon.    Review of Systems:  Patient denies fevers, chills, night sweats, unexplained weight changes, headaches, dizziness, vision or hearing changes, new lumps or bumps, chest pain, shortness of breath, cough, abdominal pain, nausea, vomiting, changes to bowel or bladder, swelling of extremities, bleeding issues, or rash.    Current Outpatient Prescriptions   Medication Sig Dispense Refill     Fluorouracil (ADURCIL) 5 GM/100ML SOLN        HYDROcodone-acetaminophen (NORCO) 5-325 MG per tablet   0     oxyCODONE IR (ROXICODONE) 5 MG tablet Take 1 tablet (5 mg) by mouth every 4 hours as needed for moderate to severe pain (Patient not taking: Reported on 1/31/2018) 30 tablet 0     ondansetron (ZOFRAN) 8 MG tablet Take 1 tablet (8 mg) by mouth every 8 hours as needed for nausea 30 tablet 0     LORazepam (ATIVAN) 0.5 MG tablet Take 1 tablet (0.5 mg) by mouth every 4 hours as needed (Anxiety, Nausea/Vomiting or Sleep) 30 tablet 2     prochlorperazine (COMPAZINE) 10 MG tablet Take 1 tablet (10 mg) by mouth every 6 hours as needed (Nausea/Vomiting) 30 tablet 2     amylase-lipase-protease (CREON) 79603-69762 UNITS CPEP per EC capsule Take 2-3 with meals / 1-2 with snacks, up to 15 per day. 450 capsule 6       Physical Examination:  BP 98/75 (BP Location: Right arm, Patient Position: Sitting, Cuff Size: Adult Regular)  Pulse 77  Temp 97.8  F (36.6  C) (Oral)  Resp 16  Ht 1.651 m (5' 5\")  Wt 72.9 kg (160 lb 11.2 oz)  SpO2 99%  Breastfeeding? No  BMI 26.74 kg/m2  Wt Readings from Last 10 Encounters: "   01/31/18 73.2 kg (161 lb 4.8 oz)   01/26/18 76.2 kg (168 lb)   01/15/18 76.6 kg (168 lb 14.4 oz)   01/12/18 76.2 kg (168 lb)   01/08/18 76.5 kg (168 lb 11.2 oz)   01/08/18 76.5 kg (168 lb 11.2 oz)   12/12/17 81.7 kg (180 lb 3.2 oz)   12/04/17 78.3 kg (172 lb 9.9 oz)   11/29/17 80.1 kg (176 lb 9.4 oz)   11/27/17 81.1 kg (178 lb 14.4 oz)     Constitutional: Well-appearing female in no acute distress.  Eyes: EOMI, PERRL. No scleral icterus.  ENT: Oral mucosa is moist without lesions or thrush. Mild edema of left tonsil without erythema or exudates.   Lymphatic: Neck is supple without cervical or supraclavicular lymphadenopathy.   Cardiovascular: Regular rate and rhythm. No murmurs, gallops, or rubs. No peripheral edema.  Respiratory: Clear to auscultation bilaterally. No wheezes or crackles.  Gastrointestinal: Bowel sounds present. Abdomen soft, mildly tender to palpation throughout. No palpable hepatosplenomegaly or masses.   Neurologic: Cranial nerves II through XII are grossly intact.  Skin: No rashes, petechiae, or bruising noted on exposed skin.    Laboratory Data:  Results for EZEQUIEL CHAVIS (MRN [id number]) as of 2/5/2018 08:35   2/5/2018 07:46   Sodium 138   Potassium 3.8   Chloride 105   Carbon Dioxide 26   Urea Nitrogen 18   Creatinine 0.59   GFR Estimate >90   GFR Estimate If Black >90   Calcium 9.0   Anion Gap 7   Albumin 3.4   Protein Total 7.5   Bilirubin Total 0.3   Alkaline Phosphatase 104   ALT 43   AST 22   Glucose 138 (H)   WBC 5.7   Hemoglobin 12.9   Hematocrit 39.6   Platelet Count 257   RBC Count 4.23   MCV 94   MCH 30.5   MCHC 32.6   RDW 14.6   Diff Method Automated Method   % Neutrophils 38.9   % Lymphocytes 47.3   % Monocytes 11.5   % Eosinophils 1.2   % Basophils 0.7   % Immature Granulocytes 0.4   Nucleated RBCs 0   Absolute Neutrophil 2.2   Absolute Lymphocytes 2.7   Absolute Monocytes 0.7   Absolute Eosinophils 0.1   Absolute Basophils 0.0   Abs Immature Granulocytes 0.0   Absolute  Nucleated RBC 0.0         Assessment and Plan:  1. Pancreatic cancer  S/p cycle 1 FOLFIRINOX 1/15/18 which was well tolerated other than mild nausea and cold sensitivity.Unforutantely she was hospitalized for recurrent pancreatitis on 1/20/18-1/27/8. She was noted to be neutropenic at hospital follow-up and as such Neulasta was added to her regimen. ANC today 2.2. Due for cycle 2 today and labs are within treatment parameters to move forward. Will schedule for cycle 3 and abdominal Xray in 2 weeks. Overall plan is to complete 4 cycles of FOLRIRNOX before re-imaging and discussing possible surgery.     Recommended using Claritin and Tylenol for Neulasta bone pain.     2. Sore throat  Very mild sore throat x 3 days with mild rhinorrhea but no fevers. Mild tonsillar swelling (no exudates however) on exam today so will do rapid strep to r/o. Strep negative, consider supportive cares for URI.    3. Peripheral neuropathy/cold sensitivity  Improving farther out from chemo. Still having mild cold sensitivity, left greater than right, but no further numbness/tingling. No interference with activities. Also having intermittent nerve sensitivity in posterior molars. Continue to monitor closely moving forward with Oxaliplatin.     4. GI pain with recurrent pancreatitis, improved   Pain has previously imprved on Creon. Since discharge, abdominal pain has continued to improve and she is not needing any pain medications. Plan to do Xray at next appointment to ensure passage of pancreatic stent. LFTs normal today.     Will ask social work to talk to the patient about financial resources for Creon.    5. Follow-Up  2/20: Kellie Mitchell, Abdominal X Ray, Cycle 3 FOLFIRINOX  3/6: LabsKellie, Cycle 4 FOLFIRINOX  3/13: Labs (CA 19-9) and CT CAP  3/19: Dr. Aleshia Dudley PA-C  [facility]  [address] [place] [address] [phone]

## 2018-02-07 ENCOUNTER — INFUSION THERAPY VISIT (OUTPATIENT)
Dept: INFUSION THERAPY | Facility: CLINIC | Age: 58
End: 2018-02-07
Attending: INTERNAL MEDICINE
Payer: COMMERCIAL

## 2018-02-07 VITALS — TEMPERATURE: 96 F | DIASTOLIC BLOOD PRESSURE: 63 MMHG | HEART RATE: 70 BPM | SYSTOLIC BLOOD PRESSURE: 112 MMHG

## 2018-02-07 DIAGNOSIS — C25.9 PANCREATIC ADENOCARCINOMA (H): Primary | ICD-10-CM

## 2018-02-07 LAB
BACTERIA SPEC CULT: NORMAL
Lab: NORMAL
SPECIMEN SOURCE: NORMAL

## 2018-02-07 PROCEDURE — 96377 APPLICATON ON-BODY INJECTOR: CPT

## 2018-02-07 PROCEDURE — 25000128 H RX IP 250 OP 636: Performed by: INTERNAL MEDICINE

## 2018-02-07 PROCEDURE — 96372 THER/PROPH/DIAG INJ SC/IM: CPT

## 2018-02-07 PROCEDURE — 25000128 H RX IP 250 OP 636: Performed by: PHYSICIAN ASSISTANT

## 2018-02-07 RX ORDER — HEPARIN SODIUM (PORCINE) LOCK FLUSH IV SOLN 100 UNIT/ML 100 UNIT/ML
5 SOLUTION INTRAVENOUS
Status: DISCONTINUED | OUTPATIENT
Start: 2018-02-07 | End: 2018-02-07 | Stop reason: HOSPADM

## 2018-02-07 RX ADMIN — SODIUM CHLORIDE, PRESERVATIVE FREE 5 ML: 5 INJECTION INTRAVENOUS at 12:41

## 2018-02-07 RX ADMIN — PEGFILGRASTIM 6 MG: KIT SUBCUTANEOUS at 12:41

## 2018-02-07 NOTE — PROGRESS NOTES
5FU CIVI disconnect and On-Pro Neulasta application.  Pt offers no complaints.  Reviewed function of On-Pro. Lacy Bach RN

## 2018-02-07 NOTE — PROGRESS NOTES
This is a recent snapshot of the patient's [facility] medical record.  For current drug dose and complete information and questions, call [phone] or In Basket pool, [facility] home infusion ([id number])  CSN Number:  [id number]

## 2018-02-07 NOTE — MR AVS SNAPSHOT
After Visit Summary   2/7/2018    Kitty Bangura    MRN: [id number]           Patient Information     Date Of Birth          1960        Visit Information        Provider Department      2/7/2018 12:30 PM ROOM 9 [facility]        Today's Diagnoses     Pancreatic adenocarcinoma (H)    -  1       Follow-ups after your visit        Your next 10 appointments already scheduled     Feb 20, 2018  6:30 AM CST   [facility] Lab Draw with  [facility] LAB DRAW   [facility] ([facility])    [address] [place] [address]  [address]  [facility] [address]   [phone]            Feb 20, 2018  7:00 AM CST   Infusion 360 with  ONCOLOGY INFUSION, UC 10 ATC   [facility] ([facility])    [address] [place] [address]  [address]  [facility] [address]   [phone]            Feb 20, 2018 12:10 PM CST   (Arrive by 11:55 AM)   Return Visit with FORREST Ackerman [facility] ([facility])    [address] [place] [address]  [address]  [facility] [address]   [phone]            Feb 20, 2018  2:00 PM CST   XR ABDOMEN 2 VIEWS with UCXR1   [facility]ray ([facility])    [address] [place] [address]  1st Floor  [facility] [address] [phone]           Please bring a list of your current medicines to your exam. (Include vitamins, minerals and over-thecounter medicines.) Leave your valuables at home.  Tell your doctor if there is a chance you may be pregnant.  You do not need to do anything special for this exam.            Mar 06, 2018  7:15 AM CST   Masonic Lab Draw with UC MASONIC LAB DRAW   [facility] ([facility])    [address] [place] [address]  [address]  [facility] [address]   [phone]            Mar 06, 2018  7:50 AM CST   (Arrive by 7:35 AM)   Return Visit with FORREST Ackerman [facility]  [facility] ([facility])    [address]  [address]  [facility] [address]   [phone]            Mar 06, 2018  9:00 AM CST   Infusion 360 with UC ONCOLOGY INFUSION, UC 19 ATC   [facility] ([facility])    [address]  [address]  [facility] [address]   [phone]            Mar 13, 2018  9:00 AM CDT   Level O with ROOM 5 [facility] ([facility])    [facility]  [address]  [facility] [address] [phone]              Who to contact     If you have questions or need follow up information about today's clinic visit or your schedule please contact [facility] INFUSION directly at [phone].  Normal or non-critical lab and imaging results will be communicated to you by MyChart, letter or phone within 4 business days after the clinic has received the results. If you do not hear from us within 7 days, please contact the clinic through [employer] or phone. If you have a critical or abnormal lab result, we will notify you by phone as soon as possible.  Submit refill requests through [employer] or call your pharmacy and they will forward the refill request to us. Please allow 3 business days for your refill to be completed.          Additional Information About Your Visit        MyChart Information     [employer] gives you secure access to your electronic health record. If you see a primary care provider, you can also send messages to your care team and make appointments. If you have questions, please call your primary care clinic.  If you do not have a primary care provider, please call [phone] and they will assist you.        Care EveryWhere ID     This is your Care EveryWhere ID. This could be used by other organizations to access your [facility] medical records  [id number]        Your Vitals Were     Pulse Temperature                70 96  F  (35.6  C) (Oral)           Blood Pressure from Last 3 Encounters:   02/07/18 112/63   02/05/18 98/75   01/31/18 98/60    Weight from Last 3 Encounters:   02/05/18 72.9 kg (160 lb 11.2 oz)   01/31/18 73.2 kg (161 lb 4.8 oz)   01/26/18 76.2 kg (168 lb)              Today, you had the following     No orders found for display       Primary Care Provider Office Phone # Fax #    Williamsin Julito Mcgill [phone] [address] [facility] [address]        Equal Access to Services     MAN [facility] HospitalJANICE : Hadii zaheer Ruiz, waivy rasmussen, larisa graffmafredy ferguson, re gerardo . So [facility] [phone].    ATENCIÓN: Si habla español, tiene a quiros disposición servicios gratuitos de asistencia lingüística. Ll[facility] [phone].    We comply with applicable federal civil rights laws and [place] laws. We do not discriminate on the basis of race, color, national origin, age, disability, sex, sexual orientation, or gender identity.            Thank you!     Thank you for choosing [facility]  for your care. Our goal is always to provide you with excellent care. Hearing back from our patients is one way we can continue to improve our services. Please take a few minutes to complete the written survey that you may receive in the mail after your visit with us. Thank you!             Your Updated Medication List - Protect others around you: Learn how to safely use, store and throw away your medicines at www.disposemymeds.org.          This list is accurate as of 2/7/18 12:54 PM.  Always use your most recent med list.                   Brand Name Dispense Instructions for use Diagnosis    amylase-lipase-protease 95797-04717 UNITS Cpep per EC capsule    CREON    450 capsule    Take 2-3 with meals / 1-2 with snacks, up to 15 per day.    Necrotizing pancreatitis       Fluorouracil 5 GM/100ML Soln    ADURCIL          HYDROcodone-acetaminophen 5-325 MG per tablet     NORCO          LORazepam 0.5 MG tablet    ATIVAN    30 tablet    Take 1 tablet (0.5 mg) by mouth every 4 hours as needed (Anxiety, Nausea/Vomiting or Sleep)    Pancreatic adenocarcinoma (H)       ondansetron 8 MG tablet    ZOFRAN    30 tablet    Take 1 tablet (8 mg) by mouth every 8 hours as needed for nausea    Pancreatic adenocarcinoma (H)       oxyCODONE IR 5 MG tablet    ROXICODONE    30 tablet    Take 1 tablet (5 mg) by mouth every 4 hours as needed for moderate to severe pain    Necrotizing pancreatitis, Pancreatic adenocarcinoma (H)       prochlorperazine 10 MG tablet    COMPAZINE    30 tablet    Take 1 tablet (10 mg) by mouth every 6 hours as needed (Nausea/Vomiting)    Pancreatic adenocarcinoma (H)

## 2018-02-11 ENCOUNTER — HOSPITAL ENCOUNTER (EMERGENCY)
Facility: CLINIC | Age: 58
Discharge: HOME | End: 2018-02-11
Attending: EMERGENCY MEDICINE | Admitting: EMERGENCY MEDICINE
Payer: COMMERCIAL

## 2018-02-11 ENCOUNTER — APPOINTMENT (OUTPATIENT)
Dept: CT IMAGING | Facility: CLINIC | Age: 58
End: 2018-02-11
Attending: EMERGENCY MEDICINE
Payer: COMMERCIAL

## 2018-02-11 VITALS
WEIGHT: 160 LBS | OXYGEN SATURATION: 99 % | RESPIRATION RATE: 16 BRPM | DIASTOLIC BLOOD PRESSURE: 64 MMHG | TEMPERATURE: 98 F | HEIGHT: 65 IN | SYSTOLIC BLOOD PRESSURE: 94 MMHG | BODY MASS INDEX: 26.66 KG/M2

## 2018-02-11 DIAGNOSIS — C25.9 PANCREATIC ADENOCARCINOMA (H): ICD-10-CM

## 2018-02-11 DIAGNOSIS — K85.90 ACUTE PANCREATITIS, UNSPECIFIED COMPLICATION STATUS, UNSPECIFIED PANCREATITIS TYPE: ICD-10-CM

## 2018-02-11 LAB
ALBUMIN SERPL-MCNC: 3.3 G/DL (ref 3.4–5)
ALBUMIN UR-MCNC: 10 MG/DL
ALP SERPL-CCNC: 168 U/L (ref 40–150)
ALT SERPL W P-5'-P-CCNC: 29 U/L (ref 0–50)
AMYLASE SERPL-CCNC: 50 U/L (ref 30–110)
ANION GAP SERPL CALCULATED.3IONS-SCNC: 7 MMOL/L (ref 3–14)
APPEARANCE UR: CLEAR
AST SERPL W P-5'-P-CCNC: 10 U/L (ref 0–45)
BASOPHILS # BLD AUTO: 0 10E9/L (ref 0–0.2)
BASOPHILS NFR BLD AUTO: 0 %
BILIRUB SERPL-MCNC: 0.5 MG/DL (ref 0.2–1.3)
BILIRUB UR QL STRIP: NEGATIVE
BUN SERPL-MCNC: 24 MG/DL (ref 7–30)
CALCIUM SERPL-MCNC: 8.4 MG/DL (ref 8.5–10.1)
CHLORIDE SERPL-SCNC: 103 MMOL/L (ref 94–109)
CO2 SERPL-SCNC: 26 MMOL/L (ref 20–32)
COLOR UR AUTO: YELLOW
CREAT SERPL-MCNC: 0.58 MG/DL (ref 0.52–1.04)
DIFFERENTIAL METHOD BLD: ABNORMAL
EOSINOPHIL # BLD AUTO: 0 10E9/L (ref 0–0.7)
EOSINOPHIL NFR BLD AUTO: 0 %
ERYTHROCYTE [DISTWIDTH] IN BLOOD BY AUTOMATED COUNT: 14.7 % (ref 10–15)
GFR SERPL CREATININE-BSD FRML MDRD: >90 ML/MIN/1.7M2
GLUCOSE SERPL-MCNC: 112 MG/DL (ref 70–99)
GLUCOSE UR STRIP-MCNC: NEGATIVE MG/DL
HCT VFR BLD AUTO: 40.4 % (ref 35–47)
HGB BLD-MCNC: 13.3 G/DL (ref 11.7–15.7)
HGB UR QL STRIP: NEGATIVE
INR PPP: 1.04 (ref 0.86–1.14)
KETONES UR STRIP-MCNC: NEGATIVE MG/DL
LEUKOCYTE ESTERASE UR QL STRIP: NEGATIVE
LIPASE SERPL-CCNC: 71 U/L (ref 73–393)
LYMPHOCYTES # BLD AUTO: 5.8 10E9/L (ref 0.8–5.3)
LYMPHOCYTES NFR BLD AUTO: 17.5 %
MCH RBC QN AUTO: 30.6 PG (ref 26.5–33)
MCHC RBC AUTO-ENTMCNC: 32.9 G/DL (ref 31.5–36.5)
MCV RBC AUTO: 93 FL (ref 78–100)
MONOCYTES # BLD AUTO: 0.9 10E9/L (ref 0–1.3)
MONOCYTES NFR BLD AUTO: 2.6 %
MUCOUS THREADS #/AREA URNS LPF: PRESENT /LPF
MYELOCYTES # BLD: 0.3 10E9/L
MYELOCYTES NFR BLD MANUAL: 0.9 %
NEUTROPHILS # BLD AUTO: 26.1 10E9/L (ref 1.6–8.3)
NEUTROPHILS NFR BLD AUTO: 79 %
NITRATE UR QL: NEGATIVE
PH UR STRIP: 5.5 PH (ref 5–7)
PLATELET # BLD AUTO: 156 10E9/L (ref 150–450)
PLATELET # BLD EST: ABNORMAL 10*3/UL
POTASSIUM SERPL-SCNC: 4 MMOL/L (ref 3.4–5.3)
PROT SERPL-MCNC: 7.2 G/DL (ref 6.8–8.8)
RBC # BLD AUTO: 4.34 10E12/L (ref 3.8–5.2)
RBC #/AREA URNS AUTO: <1 /HPF (ref 0–2)
RBC MORPH BLD: NORMAL
SODIUM SERPL-SCNC: 137 MMOL/L (ref 133–144)
SOURCE: ABNORMAL
SP GR UR STRIP: 1.02 (ref 1–1.03)
SQUAMOUS #/AREA URNS AUTO: <1 /HPF (ref 0–1)
UROBILINOGEN UR STRIP-MCNC: NORMAL MG/DL (ref 0–2)
WBC # BLD AUTO: 33 10E9/L (ref 4–11)
WBC #/AREA URNS AUTO: <1 /HPF (ref 0–2)

## 2018-02-11 PROCEDURE — 96361 HYDRATE IV INFUSION ADD-ON: CPT | Performed by: EMERGENCY MEDICINE

## 2018-02-11 PROCEDURE — 83690 ASSAY OF LIPASE: CPT | Performed by: EMERGENCY MEDICINE

## 2018-02-11 PROCEDURE — 80053 COMPREHEN METABOLIC PANEL: CPT | Performed by: EMERGENCY MEDICINE

## 2018-02-11 PROCEDURE — 99285 EMERGENCY DEPT VISIT HI MDM: CPT | Mod: Z6 | Performed by: EMERGENCY MEDICINE

## 2018-02-11 PROCEDURE — 82150 ASSAY OF AMYLASE: CPT | Performed by: EMERGENCY MEDICINE

## 2018-02-11 PROCEDURE — 81001 URINALYSIS AUTO W/SCOPE: CPT | Performed by: EMERGENCY MEDICINE

## 2018-02-11 PROCEDURE — 74177 CT ABD & PELVIS W/CONTRAST: CPT

## 2018-02-11 PROCEDURE — 25000125 ZZHC RX 250: Performed by: EMERGENCY MEDICINE

## 2018-02-11 PROCEDURE — 96374 THER/PROPH/DIAG INJ IV PUSH: CPT | Performed by: EMERGENCY MEDICINE

## 2018-02-11 PROCEDURE — 99285 EMERGENCY DEPT VISIT HI MDM: CPT | Mod: 25 | Performed by: EMERGENCY MEDICINE

## 2018-02-11 PROCEDURE — 85610 PROTHROMBIN TIME: CPT | Performed by: EMERGENCY MEDICINE

## 2018-02-11 PROCEDURE — 85025 COMPLETE CBC W/AUTO DIFF WBC: CPT | Performed by: EMERGENCY MEDICINE

## 2018-02-11 PROCEDURE — 96376 TX/PRO/DX INJ SAME DRUG ADON: CPT | Performed by: EMERGENCY MEDICINE

## 2018-02-11 PROCEDURE — 25000128 H RX IP 250 OP 636: Performed by: EMERGENCY MEDICINE

## 2018-02-11 RX ORDER — IOPAMIDOL 755 MG/ML
99 INJECTION, SOLUTION INTRAVASCULAR ONCE
Status: COMPLETED | OUTPATIENT
Start: 2018-02-11 | End: 2018-02-11

## 2018-02-11 RX ORDER — HYDROMORPHONE HYDROCHLORIDE 1 MG/ML
0.5 INJECTION, SOLUTION INTRAMUSCULAR; INTRAVENOUS; SUBCUTANEOUS
Status: DISCONTINUED | OUTPATIENT
Start: 2018-02-11 | End: 2018-02-11 | Stop reason: HOSPADM

## 2018-02-11 RX ORDER — SODIUM CHLORIDE 9 MG/ML
1000 INJECTION, SOLUTION INTRAVENOUS CONTINUOUS
Status: DISCONTINUED | OUTPATIENT
Start: 2018-02-11 | End: 2018-02-11 | Stop reason: HOSPADM

## 2018-02-11 RX ORDER — HYDROMORPHONE HYDROCHLORIDE 2 MG/1
2 TABLET ORAL EVERY 4 HOURS PRN
Qty: 25 TABLET | Refills: 0 | Status: ON HOLD | OUTPATIENT
Start: 2018-02-11 | End: 2018-04-22

## 2018-02-11 RX ORDER — HYDROMORPHONE HYDROCHLORIDE 1 MG/ML
0.5 INJECTION, SOLUTION INTRAMUSCULAR; INTRAVENOUS; SUBCUTANEOUS ONCE
Status: COMPLETED | OUTPATIENT
Start: 2018-02-11 | End: 2018-02-11

## 2018-02-11 RX ORDER — ONDANSETRON 2 MG/ML
4 INJECTION INTRAMUSCULAR; INTRAVENOUS EVERY 30 MIN PRN
Status: DISCONTINUED | OUTPATIENT
Start: 2018-02-11 | End: 2018-02-11 | Stop reason: HOSPADM

## 2018-02-11 RX ORDER — HEPARIN SODIUM (PORCINE) LOCK FLUSH IV SOLN 100 UNIT/ML 100 UNIT/ML
5 SOLUTION INTRAVENOUS
Status: DISCONTINUED | OUTPATIENT
Start: 2018-02-11 | End: 2018-02-11 | Stop reason: HOSPADM

## 2018-02-11 RX ADMIN — SODIUM CHLORIDE, PRESERVATIVE FREE 5 ML: 5 INJECTION INTRAVENOUS at 16:37

## 2018-02-11 RX ADMIN — HYDROMORPHONE HYDROCHLORIDE 0.5 MG: 10 INJECTION, SOLUTION INTRAMUSCULAR; INTRAVENOUS; SUBCUTANEOUS at 16:34

## 2018-02-11 RX ADMIN — SODIUM CHLORIDE 1000 ML: 9 INJECTION, SOLUTION INTRAVENOUS at 12:23

## 2018-02-11 RX ADMIN — Medication 0.5 MG: at 12:23

## 2018-02-11 RX ADMIN — IOPAMIDOL 99 ML: 755 INJECTION, SOLUTION INTRAVENOUS at 14:34

## 2018-02-11 RX ADMIN — SODIUM CHLORIDE, PRESERVATIVE FREE 75 ML: 5 INJECTION INTRAVENOUS at 14:40

## 2018-02-11 ASSESSMENT — ENCOUNTER SYMPTOMS
ABDOMINAL PAIN: 1
NAUSEA: 1
FEVER: 0
CHILLS: 0
VOMITING: 0

## 2018-02-11 NOTE — ED PROVIDER NOTES
History     Chief Complaint   Patient presents with     Pancreatitis     HPI  Kitty Bangura is a 57 year old female with history of pancreatic adenocarcinoma (on Folfirinox x2) and recurrent necrotizing pancreatitis (s/p EGD with necrosectomy ×2 most recent 12/12/17, ERCP with stent placement 1/25/18) who presents the ED with acute epigastric abdominal pain.  Patient states she started having epigastric abdominal pain that radiates into her back yesterday morning.  However, the pain has gotten worse over the past 24 hours.  She states this pain is similar to previous episodes of pancreatitis.  She states she was able to eat a small amount of food and keep it down, but does have nausea. She reports she is still taking her Creon and her normal dose of analgesics.  She otherwise currently denies any fevers, chills, or vomiting.     PAST MEDICAL HISTORY  Past Medical History:   Diagnosis Date     Cancer (H)      Necrotizing pancreatitis      PONV (postoperative nausea and vomiting)      PAST SURGICAL HISTORY  Past Surgical History:   Procedure Laterality Date     ABDOMEN SURGERY  1983    Ruptured tubal pregnancies, additional surgeries followed     BACK SURGERY  2004    L5, S1 discectomy     BREAST SURGERY  2003    Breast reduction     COLONOSCOPY  2008     ENDOSCOPIC RETROGRADE CHOLANGIOPANCREATOGRAM N/A 1/25/2018    Procedure: COMBINED ENDOSCOPIC RETROGRADE CHOLANGIOPANCREATOGRAPHY, PLACE TUBE/STENT;  Endoscopic retrograde cholangiopancreatogram with stent placement and cyst drainage bile ;  Surgeon: Vito Sanches MD;  Location: [facility] OR     ENDOSCOPIC ULTRASOUND LOWER GASTROINTESTIONAL TRACT (GI) N/A 1/25/2018    Procedure: ENDOSCOPIC ULTRASOUND LOWER GASTROINTESTIONAL TRACT (GI);  Endoscopic Ultrasound with guided Cyst-Gastrostomy;  Surgeon: González Metz MD;  Location: [facility] OR     ENDOSCOPIC ULTRASOUND, ESOPHAGOSCOPY, GASTROSCOPY, DUODENOSCOPY (EGD), NECROSECTOMY N/A 12/4/2017    Procedure:  ENDOSCOPIC ULTRASOUND, ESOPHAGOSCOPY, GASTROSCOPY, DUODENOSCOPY (EGD), NECROSECTOMY;  Esophagogastroduodenoscopy, with  Necrosectomy and stents replacement  ;  Surgeon: González Metz MD;  Location: [facility] OR     ENDOSCOPIC ULTRASOUND, ESOPHAGOSCOPY, GASTROSCOPY, DUODENOSCOPY (EGD), NECROSECTOMY N/A 2017    Procedure: ENDOSCOPIC ULTRASOUND, ESOPHAGOSCOPY, GASTROSCOPY, DUODENOSCOPY (EGD), NECROSECTOMY;  Esophagogastroduodenoscopy with Necrosectomy, Balloon Dilation, stent removal X3 and Cystgastrostomy stent Placement X2;  Surgeon: Guru Cecilia Staples MD;  Location: [facility] OR     ESOPHAGOSCOPY, GASTROSCOPY, DUODENOSCOPY (EGD), COMBINED N/A 2017    Procedure: COMBINED ENDOSCOPIC ULTRASOUND, ESOPHAGOSCOPY, GASTROSCOPY, DUODENOSCOPY (EGD), FINE NEEDLE ASPIRATE/BIOPSY;  Endoscopic Ultrasound with cystgastrostomy and fine needle aspiration ;  Surgeon: González Metz MD;  Location: [facility] OR     GYN SURGERY      Multiple ectopic pregnancies, reconnect,       INSERT PORT VASCULAR ACCESS Right 2018    Procedure: INSERT PORT VASCULAR ACCESS;  Chest Port Placement - right;  Surgeon: Chanda Lawson PA-C;  Location:  OR     FAMILY HISTORY  Family History   Problem Relation Age of Onset     HEART DISEASE Father      Hyperlipidemia Father      HEART DISEASE Brother      DIABETES Mother      Hypertension Mother      Obesity Mother      DIABETES Maternal Grandfather      Hypertension Maternal Grandfather      Obesity Maternal Grandfather      DIABETES Sister      Hypertension Sister      Depression Sister      Anxiety Disorder Sister      Obesity Sister      Hypertension Brother      Hyperlipidemia Brother      Breast Cancer Cousin      Breast Cancer Cousin      Breast Cancer Cousin      Colon Cancer Other      Other Cancer Other      Mesothelioma     Depression Sister      Anxiety Disorder Brother      Anxiety Disorder Son      MENTAL ILLNESS Sister      Schizophrenia      "Obesity Maternal Grandmother      Obesity Sister      SOCIAL HISTORY  Social History   Substance Use Topics     Smoking status: Former Smoker     Packs/day: 0.50     Types: Cigarettes     Start date: 1/1/1974     Quit date: 1981     Smokeless tobacco: Never Used     Alcohol use No      Comment: Now quit since Oct 31     MEDICATIONS  Current Facility-Administered Medications   Medication     0.9% sodium chloride BOLUS    Followed by     0.9% sodium chloride infusion     ondansetron (ZOFRAN) injection 4 mg     HYDROmorphone (PF) (DILAUDID) injection 0.5 mg     Current Outpatient Prescriptions   Medication     Fluorouracil (ADURCIL) 5 GM/100ML SOLN     HYDROcodone-acetaminophen (NORCO) 5-325 MG per tablet     oxyCODONE IR (ROXICODONE) 5 MG tablet     ondansetron (ZOFRAN) 8 MG tablet     LORazepam (ATIVAN) 0.5 MG tablet     prochlorperazine (COMPAZINE) 10 MG tablet     amylase-lipase-protease (CREON) 45643-64511 UNITS CPEP per EC capsule     ALLERGIES  No Known Allergies    I have reviewed the Medications, Allergies, Past Medical and Surgical History, and Social History in the Epic system.    Review of Systems   Constitutional: Negative for chills and fever.   Gastrointestinal: Positive for abdominal pain (epigastric radiates into back) and nausea. Negative for vomiting.   All other systems reviewed and are negative.      Physical Exam   BP: 102/72  Heart Rate: 84  Temp: 97.7  F (36.5  C)  Resp: 16  Height: 165.1 cm (5' 5\")  Weight: 72.6 kg (160 lb)  SpO2: 96 %      Physical Exam   Constitutional: She is oriented to person, place, and time. She appears well-developed and well-nourished.   HENT:   Head: Normocephalic and atraumatic.   Neck: Normal range of motion.   Cardiovascular: Normal rate, regular rhythm and normal heart sounds.    Pulmonary/Chest: Effort normal and breath sounds normal. No respiratory distress.   Abdominal: Soft. She exhibits no distension. There is tenderness (epigastric tenderness; ). There is no " rebound.   Musculoskeletal: She exhibits no tenderness.   Neurological: She is alert and oriented to person, place, and time.   Skin: Skin is warm and dry.   Psychiatric: She has a normal mood and affect. Her behavior is normal. Thought content normal.       ED Course     ED Course     Procedures             Critical Care time:  none         Results for orders placed or performed during the hospital encounter of 02/11/18   CT Abdomen Pelvis w Contrast    Narrative    EXAMINATION: CT ABDOMEN PELVIS W CONTRAST, 2/11/2018 2:41 PM    TECHNIQUE:  Helical CT images from the lung bases through the  symphysis pubis were obtained  with IV contrast. Contrast dose: 99cc  of Isovue 370    COMPARISON: 1/20/2018    HISTORY: abd pain, concern for pancreatitis;     FINDINGS:  Mild dependent atelectasis in lung bases.    Mixed cystic and solid mass in the pancreatic tail is similar to  previous exam, measuring up to 3.4 cm. Mild inflammatory changes do  surround the pancreas, most pronounced in the head and neck region.  These have improved from comparison exam. There is a new cyst  gastrostomy tube in place with interval resolution of the fluid  collection previously seen anterior the pancreatic head. New  pancreatic stent in place. No pancreatic duct dilation. No residual  drainable fluid collections are currently identified. Portal vein and  splenic vein are patent.     Small hypodensities in the liver are stable from comparison exam and  difficult to fully characterize. The largest of these is seen on  series 3 image 80 measuring 1.2 cm. No new liver lesions. 3 mm  fat-containing left renal lesion on series 3 image 100 compatible with  angiomyolipoma. The adrenal glands, gallbladder, and spleen are  normal. Normal caliber of the small bowel. Normal appendix. Bladder,  uterus, and adnexa are unremarkable.    Degenerative changes in the spine. Bones are otherwise unremarkable.      Impression    IMPRESSION:   1. Improvement in  peripancreatic inflammation of pancreatitis and  resolution of peripancreatic fluid collections following placement of  a cyst gastrostomy tube and pancreatic stent.    2. Unchanged appearance of pancreatic mass in the junction of body and  tail compatible with patient's known adenocarcinoma.  3. Unchanged hepatic hypodensities which are too small to  characterize.    SUSAN CAMARENA MD   CBC with platelets differential   Result Value Ref Range    WBC 33.0 (H) 4.0 - 11.0 10e9/L    RBC Count 4.34 3.8 - 5.2 10e12/L    Hemoglobin 13.3 11.7 - 15.7 g/dL    Hematocrit 40.4 35.0 - 47.0 %    MCV 93 78 - 100 fl    MCH 30.6 26.5 - 33.0 pg    MCHC 32.9 31.5 - 36.5 g/dL    RDW 14.7 10.0 - 15.0 %    Platelet Count 156 150 - 450 10e9/L    Diff Method Manual Differential     % Neutrophils 79.0 %    % Lymphocytes 17.5 %    % Monocytes 2.6 %    % Eosinophils 0.0 %    % Basophils 0.0 %    % Myelocytes 0.9 %    Absolute Neutrophil 26.1 (H) 1.6 - 8.3 10e9/L    Absolute Lymphocytes 5.8 (H) 0.8 - 5.3 10e9/L    Absolute Monocytes 0.9 0.0 - 1.3 10e9/L    Absolute Eosinophils 0.0 0.0 - 0.7 10e9/L    Absolute Basophils 0.0 0.0 - 0.2 10e9/L    Absolute Myelocytes 0.3 (H) 0 10e9/L    RBC Morphology Normal     Platelet Estimate Confirming automated cell count    Comprehensive metabolic panel   Result Value Ref Range    Sodium 137 133 - 144 mmol/L    Potassium 4.0 3.4 - 5.3 mmol/L    Chloride 103 94 - 109 mmol/L    Carbon Dioxide 26 20 - 32 mmol/L    Anion Gap 7 3 - 14 mmol/L    Glucose 112 (H) 70 - 99 mg/dL    Urea Nitrogen 24 7 - 30 mg/dL    Creatinine 0.58 0.52 - 1.04 mg/dL    GFR Estimate >90 >60 mL/min/1.7m2    GFR Estimate If Black >90 >60 mL/min/1.7m2    Calcium 8.4 (L) 8.5 - 10.1 mg/dL    Bilirubin Total 0.5 0.2 - 1.3 mg/dL    Albumin 3.3 (L) 3.4 - 5.0 g/dL    Protein Total 7.2 6.8 - 8.8 g/dL    Alkaline Phosphatase 168 (H) 40 - 150 U/L    ALT 29 0 - 50 U/L    AST 10 0 - 45 U/L   Lipase   Result Value Ref Range    Lipase 71 (L) 73 -  393 U/L   INR   Result Value Ref Range    INR 1.04 0.86 - 1.14   UA with Microscopic   Result Value Ref Range    Color Urine Yellow     Appearance Urine Clear     Glucose Urine Negative NEG^Negative mg/dL    Bilirubin Urine Negative NEG^Negative    Ketones Urine Negative NEG^Negative mg/dL    Specific Gravity Urine 1.025 1.003 - 1.035    Blood Urine Negative NEG^Negative    pH Urine 5.5 5.0 - 7.0 pH    Protein Albumin Urine 10 (A) NEG^Negative mg/dL    Urobilinogen mg/dL Normal 0.0 - 2.0 mg/dL    Nitrite Urine Negative NEG^Negative    Leukocyte Esterase Urine Negative NEG^Negative    Source Catheterized Urine     WBC Urine <1 0 - 2 /HPF    RBC Urine <1 0 - 2 /HPF    Squamous Epithelial /HPF Urine <1 0 - 1 /HPF    Mucous Urine Present (A) NEG^Negative /LPF   Amylase   Result Value Ref Range    Amylase 50 30 - 110 U/L     Medications   0.9% sodium chloride BOLUS (0 mLs Intravenous Stopped 2/11/18 1452)     Followed by   0.9% sodium chloride infusion (not administered)   ondansetron (ZOFRAN) injection 4 mg (not administered)   HYDROmorphone (PF) (DILAUDID) injection 0.5 mg (0.5 mg Intravenous Given 2/11/18 1223)   heparin 100 UNIT/ML injection 5 mL (5 mLs Intracatheter Given 2/11/18 1637)   iopamidol (ISOVUE-370) solution 99 mL (99 mLs Intravenous Given 2/11/18 1434)   sodium chloride 0.9 % bag 500mL for CT scan flush use (75 mLs Intravenous Given 2/11/18 1440)   HYDROmorphone (PF) (DILAUDID) injection 0.5 mg (0.5 mg Intravenous Given 2/11/18 1634)            Labs Ordered and Resulted from Time of ED Arrival Up to the Time of Departure from the ED - No data to display         Assessments & Plan (with Medical Decision Making)   The patient is a 57-year-old female with a history of pancreatic cancer as well as multiple occurrences of pancreatic inflammation and necrotizing pancreatitis. The patient recently had a pancreatic drain placed about 1 month prior. The patient is currently on oral Creon. The patient presented to  the ER due to worsening abdominal pain that she was concerned was possible recurrence of pancreatitis. The patient here received 1 dose of pain medication and her abdomen is now soft and nontender. The patient has no reproducible tenderness to palpation. We did obtain labs that showed that the patient had a leukocytosis to 33,000. This is likely due to the Neulasta that she obtained 2 days prior. We did obtain a CT abdomen/pelvis that shows that her fluid collections are decreasing. The patient's pancreatic drain is in the right location. I discussed these results with the Heme/Onc fellow on-call. She agreed that the white count was likely due to the Neulasta. Since the patient is feeling better they agree with the pt being discharged to home. The patient will be discharged to home with oral Dilaudid for pain control. The patient is safe for discharge. The patient agrees to return to the ER if she develops any symptoms of infection. The patient is to follow up with her oncologist for further care.    This part of the document was transcribed by Jim Velazquez for Keshia Cunningham MD.    I have reviewed the nursing notes.    I have reviewed the findings, diagnosis, plan and need for follow up with the patient.    New Prescriptions    No medications on file       Final diagnoses:   Acute pancreatitis, unspecified complication status, unspecified pancreatitis type   Pancreatic adenocarcinoma (H)       2/11/2018   [facility], [place], EMERGENCY DEPARTMENT     Keshia Cunningham MD  02/11/18 6445

## 2018-02-11 NOTE — ED AVS SNAPSHOT
[facility], Emergency Department    [address] [address]    Phone:  [phone]                                       Kitty Bangura   MRN: [id number]    Department:  [facility], Emergency Department   Date of Visit:  2/11/2018           Patient Information     Date Of Birth          1960        Your diagnoses for this visit were:     Acute pancreatitis, unspecified complication status, unspecified pancreatitis type     Pancreatic adenocarcinoma (H)        You were seen by Keshia Cunningham MD.        Discharge Instructions       Your CT abd/pelvis shows improvement of your acute pancreatitis. The stent is in the correct location and the fluid collections are decreasing.   Please follow up with your primary oncologist.   Return to the ER if symptoms worsen.     Future Appointments        Provider Department Dept Phone Center    2/20/2018 6:30 AM [facility] Lab Draw [phone] [facility]    2/20/2018 7:00 AM [facility]; Oncology Infusion [facility] [phone] [facility]    2/20/2018 8:20 AM DANTE Junior [facility] [phone] [facility]    2/20/2018 2:00 PM [facility] IMAGING [place] XRAY ROOM 1 [facility] Imaging [place] Xray [phone] [facility]    3/6/2018 7:15 AM [facility] Draw [facility] Lab Draw [phone] [facility]    3/6/2018 7:50 AM Kellie Nobles PA-C [facility] [phone] [facility]    3/6/2018 9:00 AM [facility] Oncology Infusion [facility] [phone] [facility]    3/13/2018 9:00 AM [place] chemo therapy [facility] [phone] [facility]    3/13/2018 10:00 AM [facility] [place] [facility] ROOM 1 [facility] -[phone] [facility]    3/19/2018 7:45 AM Jovany Monk MD [facility] [phone] [facility]      24 Hour Appointment Hotline       To make an appointment at any [facility], call  [phone] ([phone]). If you don't have a family doctor or clinic, we will help you find one. [facility] clinics are conveniently located to serve the needs of you and your family.             Review of your medicines      START taking        Dose / Directions Last dose taken    HYDROmorphone 2 MG tablet   Commonly known as:  DILAUDID   Dose:  2 mg   Quantity:  25 tablet        Take 1 tablet (2 mg) by mouth every 4 hours as needed for pain maximum 4 tablet(s) per day   Refills:  0          Our records show that you are taking the medicines listed below. If these are incorrect, please call your family doctor or clinic.        Dose / Directions Last dose taken    amylase-lipase-protease 21420-95492 UNITS Cpep per EC capsule   Commonly known as:  CREON   Quantity:  450 capsule        Take 2-3 with meals / 1-2 with snacks, up to 15 per day.   Refills:  6        Fluorouracil 5 GM/100ML Soln   Commonly known as:  ADURCIL        Refills:  0        HYDROcodone-acetaminophen 5-325 MG per tablet   Commonly known as:  NORCO        Refills:  0        LORazepam 0.5 MG tablet   Commonly known as:  ATIVAN   Dose:  0.5 mg   Quantity:  30 tablet        Take 1 tablet (0.5 mg) by mouth every 4 hours as needed (Anxiety, Nausea/Vomiting or Sleep)   Refills:  2        ondansetron 8 MG tablet   Commonly known as:  ZOFRAN   Dose:  8 mg   Quantity:  30 tablet        Take 1 tablet (8 mg) by mouth every 8 hours as needed for nausea   Refills:  0        oxyCODONE IR 5 MG tablet   Commonly known as:  ROXICODONE   Dose:  5 mg   Quantity:  30 tablet        Take 1 tablet (5 mg) by mouth every 4 hours as needed for moderate to severe pain   Refills:  0        prochlorperazine 10 MG tablet   Commonly known as:  COMPAZINE   Dose:  10 mg   Quantity:  30 tablet        Take 1 tablet (10 mg) by mouth every 6 hours as needed (Nausea/Vomiting)   Refills:  2                Prescriptions were sent or printed at these locations (1 Prescription)                    Other Prescriptions                Printed at Department/Unit printer (1 of 1)         HYDROmorphone (DILAUDID) 2 MG tablet                Procedures and tests performed during your visit     Amylase    CBC with platelets differential    CT Abdomen Pelvis w Contrast    Comprehensive metabolic panel    Give 20 ounces of water 15 minutes before CT of abdomen    INR    Lipase    UA with Microscopic      Orders Needing Specimen Collection     None      Pending Results     No orders found from 2/9/2018 to 2/12/2018.            Pending Culture Results     No orders found from 2/9/2018 to 2/12/2018.            Pending Results Instructions     If you had any lab results that were not finalized at the time of your Discharge, you can call the ED Lab Result RN at [phone]. You will be contacted by this team for any positive Lab results or changes in treatment. The nurses are available 7 days a week from 10A to 6:30P.  You can leave a message 24 hours per day and they will return your call.        Thank you for choosing [facility]       Thank you for choosing [facility] for your care. Our goal is always to provide you with excellent care. Hearing back from our patients is one way we can continue to improve our services. Please take a few minutes to complete the written survey that you may receive in the mail after you visit with us. Thank you!        [employer] Information     [employer] gives you secure access to your electronic health record. If you see a primary care provider, you can also send messages to your care team and make appointments. If you have questions, please call your primary care clinic.  If you do not have a primary care provider, please call [phone] and they will assist you.        Care EveryWhere ID     This is your Care EveryWhere ID. This could be used by other organizations to access your [facility] medical records  [id number]        Equal Access to Services     CHASE DE GUZMAN AH: Hadii zaheer ku  venus Ruiz, solange rasmussen, larisa ferguson, re yip. So [facility] [phone].    ATENCIÓN: Si habla español, tiene a quiros disposición servicios gratuitos de asistencia lingüística. Llame al [phone].    We comply with applicable federal civil rights laws and [place] laws. We do not discriminate on the basis of race, color, national origin, age, disability, sex, sexual orientation, or gender identity.            After Visit Summary       This is your record. Keep this with you and show to your community pharmacist(s) and doctor(s) at your next visit.

## 2018-02-11 NOTE — ED NOTES
Alert orientated ambulatory patient presents to ER triage with c/o:    1.) Pancreatitis    Hx:  Patient has significant hx of pancreatitis states today's symptom set is consistent with with that issue.  Patient home medication regime ineffective at controlling pain, nausea is fairly well controlled.  Patient states she has a power port.       Airway WDLs  Breathing WDLs  Circulation WDLs

## 2018-02-11 NOTE — DISCHARGE INSTRUCTIONS
Your CT abd/pelvis shows improvement of your acute pancreatitis. The stent is in the correct location and the fluid collections are decreasing.   Please follow up with your primary oncologist.   Return to the ER if symptoms worsen.

## 2018-02-11 NOTE — ED AVS SNAPSHOT
[facility], [place], Emergency Department    [address] [place] [address]    Phone:  [phone]                                       Kitty Bangura   MRN: [id number]    Department:  [facility], Emergency Department   Date of Visit:  2/11/2018           After Visit Summary Signature Page     I have received my discharge instructions, and my questions have been answered. I have discussed any challenges I see with this plan with the nurse or doctor.    ..........................................................................................................................................  Patient/Patient Representative Signature      ..........................................................................................................................................  Patient Representative Print Name and Relationship to Patient    ..................................................               ................................................  Date                                            Time    ..........................................................................................................................................  Reviewed by Signature/Title    ...................................................              ..............................................  Date                                                            Time

## 2018-02-12 ENCOUNTER — TELEPHONE (OUTPATIENT)
Dept: ONCOLOGY | Facility: CLINIC | Age: 58
End: 2018-02-12

## 2018-02-12 ENCOUNTER — CARE COORDINATION (OUTPATIENT)
Dept: ONCOLOGY | Facility: CLINIC | Age: 58
End: 2018-02-12

## 2018-02-12 DIAGNOSIS — K85.91 NECROTIZING PANCREATITIS: ICD-10-CM

## 2018-02-12 DIAGNOSIS — C25.9 PANCREATIC ADENOCARCINOMA (H): Primary | ICD-10-CM

## 2018-02-12 NOTE — PROGRESS NOTES
The patient was called for follow-up after a ER visit for epigastric abdominal pain on 2/11/18.  Kitty said that the abdominal pain was better today.  She has had one Dilaudid and had relief of pain after the medication.  She denies nausea, she denies having an elevated temperature.  She has passed 3 stools this afternoon, 2 described as watery, one as very soft.    We reviewed her follow-up appointment in 2/14/18 for a lab appointment and to see Kellie HOWELL.    She was asked to try to push oral fluids, and to eat a bland diet with the loose stools.  She was asked to call triage if she would develop an elevated temperature, have increase abdominal pain, develop nausea, have > 3 watery stools per day or have any further concerns.

## 2018-02-13 ASSESSMENT — ENCOUNTER SYMPTOMS
DECREASED APPETITE: 1
SEIZURES: 0
HALLUCINATIONS: 0
BOWEL INCONTINENCE: 0
FEVER: 0
PARALYSIS: 0
PARALYSIS: 0
HOARSE VOICE: 1
DOUBLE VISION: 0
SKIN CHANGES: 0
MYALGIAS: 0
NECK PAIN: 0
STIFFNESS: 0
EYE PAIN: 0
BLOOD IN STOOL: 0
HEADACHES: 1
CHILLS: 1
INCREASED ENERGY: 1
POLYPHAGIA: 0
NERVOUS/ANXIOUS: 0
EYE IRRITATION: 0
WEIGHT LOSS: 1
JAUNDICE: 0
PANIC: 0
EYE REDNESS: 0
FEVER: 0
HOARSE VOICE: 1
TROUBLE SWALLOWING: 0
STIFFNESS: 0
BLOATING: 0
FATIGUE: 1
NUMBNESS: 0
TINGLING: 1
DIARRHEA: 1
EYE PAIN: 0
DOUBLE VISION: 0
MYALGIAS: 0
SORE THROAT: 0
BOWEL INCONTINENCE: 0
SKIN CHANGES: 0
SEIZURES: 0
RECTAL PAIN: 1
MUSCLE WEAKNESS: 0
JOINT SWELLING: 0
MUSCLE WEAKNESS: 0
INCREASED ENERGY: 1
DECREASED APPETITE: 1
DEPRESSION: 0
INSOMNIA: 1
POOR WOUND HEALING: 0
WEAKNESS: 0
SPEECH CHANGE: 0
MEMORY LOSS: 0
CHILLS: 1
FATIGUE: 1
DISTURBANCES IN COORDINATION: 0
EYE IRRITATION: 0
PANIC: 0
CONSTIPATION: 1
EYE WATERING: 0
LOSS OF CONSCIOUSNESS: 0
POLYDIPSIA: 0
NAUSEA: 1
SINUS PAIN: 0
EYE WATERING: 0
NERVOUS/ANXIOUS: 0
NECK MASS: 0
TREMORS: 0
MUSCLE CRAMPS: 0
NAIL CHANGES: 0
WEIGHT LOSS: 1
NUMBNESS: 0
RECTAL PAIN: 1
SINUS PAIN: 0
INSOMNIA: 1
BACK PAIN: 1
DEPRESSION: 0
POOR WOUND HEALING: 0
MUSCLE CRAMPS: 0
NAIL CHANGES: 0
TASTE DISTURBANCE: 1
SMELL DISTURBANCE: 0
ABDOMINAL PAIN: 1
DISTURBANCES IN COORDINATION: 0
TROUBLE SWALLOWING: 0
NECK PAIN: 0
HEARTBURN: 1
JAUNDICE: 0
HEARTBURN: 1
DECREASED CONCENTRATION: 0
NIGHT SWEATS: 0
ARTHRALGIAS: 0
LOSS OF CONSCIOUSNESS: 0
WEIGHT GAIN: 0
SORE THROAT: 0
SMELL DISTURBANCE: 0
ABDOMINAL PAIN: 1
MEMORY LOSS: 0
DIARRHEA: 1
DIZZINESS: 0
DIZZINESS: 0
NIGHT SWEATS: 0
WEAKNESS: 0
POLYDIPSIA: 0
BACK PAIN: 1
SINUS CONGESTION: 0
DECREASED CONCENTRATION: 0
TREMORS: 0
WEIGHT GAIN: 0
NAUSEA: 1
BLOOD IN STOOL: 0
ALTERED TEMPERATURE REGULATION: 1
NECK MASS: 0
SINUS CONGESTION: 0
POLYPHAGIA: 0
HEADACHES: 1
BLOATING: 0
HALLUCINATIONS: 0
TINGLING: 1
EYE REDNESS: 0
ALTERED TEMPERATURE REGULATION: 1
CONSTIPATION: 1
ARTHRALGIAS: 0
TASTE DISTURBANCE: 1
SPEECH CHANGE: 0
VOMITING: 0
JOINT SWELLING: 0
VOMITING: 0

## 2018-02-14 ENCOUNTER — ONCOLOGY VISIT (OUTPATIENT)
Dept: ONCOLOGY | Facility: CLINIC | Age: 58
End: 2018-02-14
Attending: PHYSICIAN ASSISTANT
Payer: COMMERCIAL

## 2018-02-14 VITALS
OXYGEN SATURATION: 98 % | WEIGHT: 159.4 LBS | TEMPERATURE: 98.1 F | HEIGHT: 65 IN | HEART RATE: 87 BPM | SYSTOLIC BLOOD PRESSURE: 99 MMHG | DIASTOLIC BLOOD PRESSURE: 66 MMHG | BODY MASS INDEX: 26.56 KG/M2

## 2018-02-14 VITALS
HEART RATE: 87 BPM | TEMPERATURE: 98.1 F | DIASTOLIC BLOOD PRESSURE: 65 MMHG | WEIGHT: 159.4 LBS | BODY MASS INDEX: 26.53 KG/M2 | OXYGEN SATURATION: 97 % | SYSTOLIC BLOOD PRESSURE: 103 MMHG

## 2018-02-14 DIAGNOSIS — C25.9 PANCREATIC ADENOCARCINOMA (H): ICD-10-CM

## 2018-02-14 DIAGNOSIS — C25.9 PANCREATIC ADENOCARCINOMA (H): Primary | ICD-10-CM

## 2018-02-14 DIAGNOSIS — M89.8X9 BONE PAIN DUE TO G-CSF: ICD-10-CM

## 2018-02-14 DIAGNOSIS — K85.91 NECROTIZING PANCREATITIS: ICD-10-CM

## 2018-02-14 LAB
ALBUMIN SERPL-MCNC: 3.3 G/DL (ref 3.4–5)
ALP SERPL-CCNC: 147 U/L (ref 40–150)
ALT SERPL W P-5'-P-CCNC: 28 U/L (ref 0–50)
ANION GAP SERPL CALCULATED.3IONS-SCNC: 8 MMOL/L (ref 3–14)
AST SERPL W P-5'-P-CCNC: 20 U/L (ref 0–45)
BASOPHILS # BLD AUTO: 0.1 10E9/L (ref 0–0.2)
BASOPHILS NFR BLD AUTO: 0.9 %
BILIRUB SERPL-MCNC: 0.3 MG/DL (ref 0.2–1.3)
BUN SERPL-MCNC: 19 MG/DL (ref 7–30)
CALCIUM SERPL-MCNC: 8.7 MG/DL (ref 8.5–10.1)
CHLORIDE SERPL-SCNC: 104 MMOL/L (ref 94–109)
CO2 SERPL-SCNC: 24 MMOL/L (ref 20–32)
CREAT SERPL-MCNC: 0.66 MG/DL (ref 0.52–1.04)
DIFFERENTIAL METHOD BLD: ABNORMAL
EOSINOPHIL # BLD AUTO: 0.1 10E9/L (ref 0–0.7)
EOSINOPHIL NFR BLD AUTO: 0.9 %
ERYTHROCYTE [DISTWIDTH] IN BLOOD BY AUTOMATED COUNT: 14.6 % (ref 10–15)
GFR SERPL CREATININE-BSD FRML MDRD: >90 ML/MIN/1.7M2
GLUCOSE SERPL-MCNC: 130 MG/DL (ref 70–99)
HCT VFR BLD AUTO: 38.5 % (ref 35–47)
HGB BLD-MCNC: 12.6 G/DL (ref 11.7–15.7)
LYMPHOCYTES # BLD AUTO: 4.6 10E9/L (ref 0.8–5.3)
LYMPHOCYTES NFR BLD AUTO: 38.6 %
MCH RBC QN AUTO: 29.9 PG (ref 26.5–33)
MCHC RBC AUTO-ENTMCNC: 32.7 G/DL (ref 31.5–36.5)
MCV RBC AUTO: 91 FL (ref 78–100)
METAMYELOCYTES # BLD: 0.1 10E9/L
METAMYELOCYTES NFR BLD MANUAL: 0.9 %
MONOCYTES # BLD AUTO: 1.8 10E9/L (ref 0–1.3)
MONOCYTES NFR BLD AUTO: 14.9 %
MYELOCYTES # BLD: 0.3 10E9/L
MYELOCYTES NFR BLD MANUAL: 2.6 %
NEUTROPHILS # BLD AUTO: 4.4 10E9/L (ref 1.6–8.3)
NEUTROPHILS NFR BLD AUTO: 36.8 %
PLATELET # BLD AUTO: 128 10E9/L (ref 150–450)
PLATELET # BLD EST: ABNORMAL 10*3/UL
POTASSIUM SERPL-SCNC: 3.8 MMOL/L (ref 3.4–5.3)
PROMYELOCYTES # BLD MANUAL: 0.5 10E9/L
PROMYELOCYTES NFR BLD MANUAL: 4.4 %
PROT SERPL-MCNC: 7 G/DL (ref 6.8–8.8)
RBC # BLD AUTO: 4.22 10E12/L (ref 3.8–5.2)
RBC MORPH BLD: NORMAL
SODIUM SERPL-SCNC: 136 MMOL/L (ref 133–144)
WBC # BLD AUTO: 12 10E9/L (ref 4–11)

## 2018-02-14 PROCEDURE — 36591 DRAW BLOOD OFF VENOUS DEVICE: CPT

## 2018-02-14 PROCEDURE — 80053 COMPREHEN METABOLIC PANEL: CPT | Performed by: PHYSICIAN ASSISTANT

## 2018-02-14 PROCEDURE — 99214 OFFICE O/P EST MOD 30 MIN: CPT | Mod: ZP | Performed by: PHYSICIAN ASSISTANT

## 2018-02-14 PROCEDURE — 25000128 H RX IP 250 OP 636: Performed by: INTERNAL MEDICINE

## 2018-02-14 PROCEDURE — 85025 COMPLETE CBC W/AUTO DIFF WBC: CPT | Performed by: PHYSICIAN ASSISTANT

## 2018-02-14 PROCEDURE — G0463 HOSPITAL OUTPT CLINIC VISIT: HCPCS | Mod: ZF

## 2018-02-14 RX ORDER — HEPARIN SODIUM (PORCINE) LOCK FLUSH IV SOLN 100 UNIT/ML 100 UNIT/ML
5 SOLUTION INTRAVENOUS ONCE
Status: COMPLETED | OUTPATIENT
Start: 2018-02-14 | End: 2018-02-14

## 2018-02-14 RX ADMIN — SODIUM CHLORIDE, PRESERVATIVE FREE 5 ML: 5 INJECTION INTRAVENOUS at 15:47

## 2018-02-14 ASSESSMENT — PAIN SCALES - GENERAL
PAINLEVEL: MODERATE PAIN (5)
PAINLEVEL: WORST PAIN (10)

## 2018-02-14 NOTE — MR AVS SNAPSHOT
After Visit Summary   2/14/2018    Kitty Bangura    MRN: [id number]           Patient Information     Date Of Birth          1960        Visit Information        Provider Department      2/14/2018 4:20 PM Kellie Nobles PA-C [facility]        Today's Diagnoses     Pancreatic adenocarcinoma (H)    -  1    Bone pain due to G-CSF           Follow-ups after your visit        Your next 10 appointments already scheduled     Feb 20, 2018  6:30 AM CST   [facility] Lab Draw with  [facility] LAB DRAW   [facility] ([facility])    [address] [place] [address]  [address]  [facility] [address]   [phone]            Feb 20, 2018  7:00 AM CST   Infusion 360 with  ONCOLOGY INFUSION, UC 10 ATC   [facility] ([facility])    [address] [place] [address]  [address]  [facility] [address]   [phone]            Feb 20, 2018  8:20 AM CST   (Arrive by 8:05 AM)   Return Visit with DANTE Junior   [facility] ([facility])    [address] [place] [address]  [address]  [facility] [address]   [phone]            Feb 20, 2018  2:00 PM CST   XR ABDOMEN 2 VIEWS with UCXR1   [facility] [place] [facility] ([facility])    [address] [place] [address]  1st Floor  [facility] [address]   [phone]           Please bring a list of your current medicines to your exam. (Include vitamins, minerals and over-thecounter medicines.) Leave your valuables at home.  Tell your doctor if there is a chance you may be pregnant.  You do not need to do anything special for this exam.            Mar 06, 2018  7:15 AM CST   [facility] Lab Draw with  MASONIC LAB DRAW   [facility] ([facility])    [address] [place] [address]  [address]  [facility] [address]   [phone]            Mar 06, 2018  7:50 AM CST   (Arrive by 7:35 AM)    Return Visit with Kellie Nobles PA-C   [facility] ([facility])    [address]  [address]  [facility] [address] [phone]            Mar 06, 2018  9:00 AM CST   Infusion 360 with UC ONCOLOGY INFUSION, UC 19 ATC   [facility] ([facility])    [address]  [address]  [facility] [id number] [phone]            Mar 13, 2018  9:00 AM CDT   Level O with ROOM 5 [facility] ([facility])    [facility]  [address] [place] [address]  [facility] [address] [phone]              Who to contact     If you have questions or need follow up information about today's clinic visit or your schedule please contact [facility] directly at [phone].  Normal or non-critical lab and imaging results will be communicated to you by [employer], letter or phone within 4 business days after the clinic has received the results. If you do not hear from us within 7 days, please contact the clinic through [employer] or phone. If you have a critical or abnormal lab result, we will notify you by phone as soon as possible.  Submit refill requests through [employer] or call your pharmacy and they will forward the refill request to us. Please allow 3 business days for your refill to be completed.          Additional Information About Your Visit        Yik [employer]art Information     [employer] gives you secure access to your electronic health record. If you see a primary care provider, you can also send messages to your care team and make appointments. If you have questions, please call your primary care clinic.  If you do not have a primary care provider, please call [phone] and they will assist you.        Care EveryWhere ID     This is your Care EveryWhere ID. This could be used by other organizations to access your [place] medical records  [id number]       "  Your Vitals Were     Pulse Temperature Height Pulse Oximetry BMI (Body Mass Index)       87 98.1  F (36.7  C) (Oral) 1.651 m (5' 5\") 98% 26.53 kg/m2        Blood Pressure from Last 3 Encounters:   02/14/18 99/66   02/14/18 103/65   02/11/18 94/64    Weight from Last 3 Encounters:   02/14/18 72.3 kg (159 lb 6.4 oz)   02/14/18 72.3 kg (159 lb 6.4 oz)   02/11/18 72.6 kg (160 lb)              Today, you had the following     No orders found for display       Primary Care Provider Office Phone # Fax #    Williamsin Julito Mcgill [phone] [address] [facility] [address]        Equal Access to Services     MAN DE GUZMAN : Hadii zaheer donovan Sopaulo, waaxda luqdavid, qaotiliata kaalmafredy ferguson, re gerardo . So [facility] [phone].    ATENCIÓN: Si habla español, tiene a quiros disposición servicios gratuitos de asistencia lingüística. [facility] [phone].    We comply with applicable federal civil rights laws and [place] laws. We do not discriminate on the basis of race, color, national origin, age, disability, sex, sexual orientation, or gender identity.            Thank you!     Thank you for choosing [facility]  for your care. Our goal is always to provide you with excellent care. Hearing back from our patients is one way we can continue to improve our services. Please take a few minutes to complete the written survey that you may receive in the mail after your visit with us. Thank you!             Your Updated Medication List - Protect others around you: Learn how to safely use, store and throw away your medicines at www.disposemymeds.org.          This list is accurate as of 2/14/18 11:59 PM.  Always use your most recent med list.                   Brand Name Dispense Instructions for use Diagnosis    amylase-lipase-protease [id number] UNITS Cpep per EC capsule    CREON    450 capsule    Take 2-3 with meals / 1-2 with snacks, up to 15 " per day.    Necrotizing pancreatitis       Fluorouracil 5 GM/100ML Soln    ADURCIL          HYDROcodone-acetaminophen 5-325 MG per tablet    NORCO          HYDROmorphone 2 MG tablet    DILAUDID    25 tablet    Take 1 tablet (2 mg) by mouth every 4 hours as needed for pain maximum 4 tablet(s) per day        LORazepam 0.5 MG tablet    ATIVAN    30 tablet    Take 1 tablet (0.5 mg) by mouth every 4 hours as needed (Anxiety, Nausea/Vomiting or Sleep)    Pancreatic adenocarcinoma (H)       ondansetron 8 MG tablet    ZOFRAN    30 tablet    Take 1 tablet (8 mg) by mouth every 8 hours as needed for nausea    Pancreatic adenocarcinoma (H)       oxyCODONE IR 5 MG tablet    ROXICODONE    30 tablet    Take 1 tablet (5 mg) by mouth every 4 hours as needed for moderate to severe pain    Necrotizing pancreatitis, Pancreatic adenocarcinoma (H)       prochlorperazine 10 MG tablet    COMPAZINE    30 tablet    Take 1 tablet (10 mg) by mouth every 6 hours as needed (Nausea/Vomiting)    Pancreatic adenocarcinoma (H)

## 2018-02-14 NOTE — NURSING NOTE
Chief Complaint   Patient presents with     Port Draw     Labs drawn via port by RN     /65 (BP Location: Right arm, Patient Position: Chair, Cuff Size: Adult Regular)  Pulse 87  Temp 98.1  F (36.7  C) (Oral)  Wt 72.3 kg (159 lb 6.4 oz)  SpO2 97%  BMI 26.53 kg/m2    Vitals taken. Port accessed by RN. Labs collected and sent. Line flushed with NS & Heparin. Pt tolerated well. Pt checked in for next appointment.    Isabel Weaver RN

## 2018-02-14 NOTE — LETTER
2/14/2018      RE: Kitty Bangura  [address] [facility] [address]       Oncology/Hematology Visit Note  Feb 14, 2018    Reason for Visit: Follow up of resectable pancreatic cancer    History of Present Illness: Staging CT chest/abd/pelvis on 12/9/17 showed several small pulmonary nodules (largest 3mm), unchanged mass in the pancreatic tail, mildly prominent mesenteric nodes thought likely reactive, and small right hepatic lobe hypodensities. Abdominal MRI on 12/10/17 showed hepatic hemangiomas, no evidence of metastatic disease to the liver.   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     Kitty met with Dr Monk and discussed neoadjuvant chemotherapy with FOLFIRINOX, then surgery and then adjuvant chemotherapy. She was seen on 1/15/18 for cycle 1 and then hospitalized for pancreatitis on 1/20/18 where she was managed with IV fluds and pain control. GI took her to the OR to attempt pancreatic duct stent placement but unfortunately it was completely obstructed. They were able to place a pancreatic stent and perform an EUS cyst-gastrostomy with plans to repeat Xray a month following to ensure stent passage. She was d/c 1/27. Cycle 2 was given 2/5/18 (a week delayed due to hospitalization).     She went to ED on 2/11 with worsening abdominal pain and was worried about pancreatitis. W/u with CT and labs including lipase were negative. She had elevated WBC of 33K but had Neulasta on 2/8. She presents today in ED follow-up.     Interval History:  Mrs. Bangura returns to clinic today with her . Her abdominal pain is back to baseline. She took a few doses of dilaudid on Sunday and Monday and felt better. However, around 1 am this morning she started to have intense bone pain in her pelvis, tailbone, L-spine, sternum. She took tylenol initially and then took a dilaudid x 2, and an oxycodone prior to coming  here. The pain is well-controlled now. She has been taking claritin daily since 2/5. She also has been having jaw pain and cramping with chewing since chemo. She has some hand spasms and cramping as well. The cold sensitivity is worse than cycle 1 and she has more oropharyngeal symptoms.     Has some mild mucositis. No fevers/chills, no significant nausea. Her bowel movements have been constipated or loose. She has about 2-3 BMs per day. She is taking Creon with meals and is not having significant bloating or dyspepsia after meals. No cough, sore throat, SOB, CP, edema.     She really would like to avoid Neulasta again with cycle 3 if possible due to bone pain.     Review of Systems:  Patient denies fevers, chills, night sweats, unexplained weight changes, headaches, dizziness, vision or hearing changes, new lumps or bumps, chest pain, shortness of breath, cough, abdominal pain, nausea, vomiting, changes to bowel or bladder, swelling of extremities, bleeding issues, or rash.    Current Outpatient Prescriptions   Medication Sig Dispense Refill     amylase-lipase-protease (CREON) [id number] UNITS CPEP per EC capsule Take 2-3 with meals / 1-2 with snacks, up to 15 per day. 450 capsule 6     HYDROmorphone (DILAUDID) 2 MG tablet Take 1 tablet (2 mg) by mouth every 4 hours as needed for pain maximum 4 tablet(s) per day 25 tablet 0     Fluorouracil (ADURCIL) 5 GM/100ML SOLN        HYDROcodone-acetaminophen (NORCO) 5-325 MG per tablet   0     oxyCODONE IR (ROXICODONE) 5 MG tablet Take 1 tablet (5 mg) by mouth every 4 hours as needed for moderate to severe pain (Patient not taking: Reported on 1/31/2018) 30 tablet 0     ondansetron (ZOFRAN) 8 MG tablet Take 1 tablet (8 mg) by mouth every 8 hours as needed for nausea 30 tablet 0     LORazepam (ATIVAN) 0.5 MG tablet Take 1 tablet (0.5 mg) by mouth every 4 hours as needed (Anxiety, Nausea/Vomiting or Sleep) 30 tablet 2     prochlorperazine (COMPAZINE) 10 MG tablet Take 1  "tablet (10 mg) by mouth every 6 hours as needed (Nausea/Vomiting) 30 tablet 2       Physical Examination:  BP 99/66 (BP Location: Right arm, Patient Position: Sitting, Cuff Size: Adult Regular)  Pulse 87  Temp 98.1  F (36.7  C) (Oral)  Ht 1.651 m (5' 5\")  Wt 72.3 kg (159 lb 6.4 oz)  SpO2 98%  BMI 26.53 kg/m2  Wt Readings from Last 10 Encounters:   02/14/18 72.3 kg (159 lb 6.4 oz)   02/14/18 72.3 kg (159 lb 6.4 oz)   02/11/18 72.6 kg (160 lb)   02/05/18 72.9 kg (160 lb 11.2 oz)   01/31/18 73.2 kg (161 lb 4.8 oz)   01/26/18 76.2 kg (168 lb)   01/15/18 76.6 kg (168 lb 14.4 oz)   01/12/18 76.2 kg (168 lb)   01/08/18 76.5 kg (168 lb 11.2 oz)   01/08/18 76.5 kg (168 lb 11.2 oz)     Constitutional: Well-appearing female in no acute distress.  Eyes: EOMI, PERRL. No scleral icterus.  ENT: Oral mucosa is moist without lesions or thrush.  Lymphatic: Neck is supple without cervical or supraclavicular lymphadenopathy.   Cardiovascular: Regular rate and rhythm. No murmurs, gallops, or rubs. No peripheral edema.  Respiratory: Clear to auscultation bilaterally. No wheezes or crackles.  Gastrointestinal: Bowel sounds present. Abdomen soft, mildly tender to palpation throughout. No palpable hepatosplenomegaly or masses.   Neurologic: Cranial nerves II through XII are grossly intact.  Skin: No rashes, petechiae, or bruising noted on exposed skin.    Laboratory Data:   2/14/2018 15:59   Sodium 136   Potassium 3.8   Chloride 104   Carbon Dioxide 24   Urea Nitrogen 19   Creatinine 0.66   GFR Estimate >90   GFR Estimate If Black >90   Calcium 8.7   Anion Gap 8   Albumin 3.3 (L)   Protein Total 7.0   Bilirubin Total 0.3   Alkaline Phosphatase 147   ALT 28   AST 20   Glucose 130 (H)   WBC 12.0 (H)   Hemoglobin 12.6   Hematocrit 38.5   Platelet Count 128 (L)   RBC Count 4.22   MCV 91   MCH 29.9   MCHC 32.7   RDW 14.6   Diff Method Manual Differential   % Neutrophils 36.8   % Lymphocytes 38.6   % Monocytes 14.9   % Eosinophils 0.9   % " Basophils 0.9   % Metamyelocytes 0.9   % Myelocytes 2.6   % Promyelocytes 4.4   Absolute Neutrophil 4.4   Absolute Lymphocytes 4.6   Absolute Monocytes 1.8 (H)   Absolute Eosinophils 0.1   Absolute Basophils 0.1   Absolute Metamyelocytes 0.1 (H)   Absolute Myelocytes 0.3 (H)   Absolute Promyelocytes 0.5 (H)   RBC Morphology Normal   Platelet Estimate Confirming automa...       Assessment and Plan:  1. Pancreatic cancer  S/p 2 cycles of FOLFIRINOX, last given 2/5 with Neulasta support given neutropenia past day 14 with cycle 1. She is having progressive motor neuropathy and cold sensitivity. I do not think she is going to be able to tolerate full-dose oxaliplatin given these symptoms. Will dose reduce by 10% and give cycle 3 without Neulasta. She is aware she may have neutropenia again despite a dose reduction but would like to try this. Cycle 3 is due 2/20. She will see Kris Dudley prior. She has an abdominal XR that day as well to ensure passing of stent. Overall plan is to complete 4 cycles of FOLRIRNOX before re-imaging and discussing possible surgery.     2. Bone pain: Start taking Tylenol 1000 mg TID scheduled. Continue daily claritin. Okay to take oxycodone or dilaudid prn when pain starts to escalate. Discussed stretching and walking as well as using heat.     3. Motor neuropathy/cold sensitivity: Dose reducing oxaliplatin next cycle as above. Avoid cold triggers and apply heat to help with cold symptoms. Stretching of jaw and hands for motor neuropathy.    4. GI: Continue Creon. Start fiber supplement to help bowel irregularity. Imodium prn for diarrhea and Senna or Miralax prn for constipation. Nausea has been well-controlled. Abdominal pain is back to baseline.    5. Mucositis: Salt and soda rinses BID to TID prn.     Kellie Nobles PA-C  [facility]  [address] [place] [address] [phone]

## 2018-02-14 NOTE — NURSING NOTE
"Oncology Rooming Note    February 14, 2018 4:45 PM   Kitty Bangura is a 57 year old female who presents for:    Chief Complaint   Patient presents with     Oncology Clinic Visit     Follow up-Pancreatic Adenocarcinoma     Initial Vitals: BP 99/66 (BP Location: Right arm, Patient Position: Sitting, Cuff Size: Adult Regular)  Pulse 87  Temp 98.1  F (36.7  C) (Oral)  Ht 1.651 m (5' 5\")  Wt 72.3 kg (159 lb 6.4 oz)  SpO2 98%  BMI 26.53 kg/m2 Estimated body mass index is 26.53 kg/(m^2) as calculated from the following:    Height as of this encounter: 1.651 m (5' 5\").    Weight as of this encounter: 72.3 kg (159 lb 6.4 oz). Body surface area is 1.82 meters squared.  Moderate Pain (5) Comment: Tailbone, Spine   No LMP recorded. Patient is postmenopausal.  Allergies reviewed: Yes  Medications reviewed: No    Medications: Medication refills not needed today.  Pharmacy name entered into [facility]:    [id number] - [place], [place] - [address] [place] [address] [id number] - [place], [place] - [address] AT [facility] OF [address] [place] [address]    Clinical concerns: Joint Pain Kellie Nobles was notified.    5 minutes for nursing intake (face to face time)     Jany Pérez LPN    Unable to go over Medication List, PA here to see            "

## 2018-02-15 RX ORDER — METHYLPREDNISOLONE SODIUM SUCCINATE 125 MG/2ML
125 INJECTION, POWDER, LYOPHILIZED, FOR SOLUTION INTRAMUSCULAR; INTRAVENOUS
Status: CANCELLED
Start: 2018-02-20

## 2018-02-15 RX ORDER — METHYLPREDNISOLONE SODIUM SUCCINATE 125 MG/2ML
125 INJECTION, POWDER, LYOPHILIZED, FOR SOLUTION INTRAMUSCULAR; INTRAVENOUS
Status: CANCELLED
Start: 2018-03-06

## 2018-02-15 RX ORDER — ALBUTEROL SULFATE 0.83 MG/ML
2.5 SOLUTION RESPIRATORY (INHALATION)
Status: CANCELLED | OUTPATIENT
Start: 2018-03-06

## 2018-02-15 RX ORDER — MEPERIDINE HYDROCHLORIDE 25 MG/ML
25 INJECTION INTRAMUSCULAR; INTRAVENOUS; SUBCUTANEOUS EVERY 30 MIN PRN
Status: CANCELLED | OUTPATIENT
Start: 2018-02-20

## 2018-02-15 RX ORDER — EPINEPHRINE 0.3 MG/.3ML
0.3 INJECTION SUBCUTANEOUS EVERY 5 MIN PRN
Status: CANCELLED | OUTPATIENT
Start: 2018-02-20

## 2018-02-15 RX ORDER — DIPHENHYDRAMINE HYDROCHLORIDE 50 MG/ML
50 INJECTION INTRAMUSCULAR; INTRAVENOUS
Status: CANCELLED
Start: 2018-03-06

## 2018-02-15 RX ORDER — LORAZEPAM 2 MG/ML
0.5 INJECTION INTRAMUSCULAR EVERY 4 HOURS PRN
Status: CANCELLED
Start: 2018-03-06

## 2018-02-15 RX ORDER — PALONOSETRON 0.05 MG/ML
0.25 INJECTION, SOLUTION INTRAVENOUS ONCE
Status: CANCELLED
Start: 2018-02-20

## 2018-02-15 RX ORDER — FLUOROURACIL 50 MG/ML
400 INJECTION, SOLUTION INTRAVENOUS ONCE
Status: CANCELLED | OUTPATIENT
Start: 2018-02-20

## 2018-02-15 RX ORDER — FLUOROURACIL 50 MG/ML
400 INJECTION, SOLUTION INTRAVENOUS ONCE
Status: CANCELLED | OUTPATIENT
Start: 2018-03-06

## 2018-02-15 RX ORDER — MEPERIDINE HYDROCHLORIDE 25 MG/ML
25 INJECTION INTRAMUSCULAR; INTRAVENOUS; SUBCUTANEOUS EVERY 30 MIN PRN
Status: CANCELLED | OUTPATIENT
Start: 2018-03-06

## 2018-02-15 RX ORDER — SODIUM CHLORIDE 9 MG/ML
1000 INJECTION, SOLUTION INTRAVENOUS CONTINUOUS PRN
Status: CANCELLED
Start: 2018-03-06

## 2018-02-15 RX ORDER — EPINEPHRINE 1 MG/ML
0.3 INJECTION, SOLUTION, CONCENTRATE INTRAVENOUS EVERY 5 MIN PRN
Status: CANCELLED | OUTPATIENT
Start: 2018-02-20

## 2018-02-15 RX ORDER — ALBUTEROL SULFATE 90 UG/1
1-2 AEROSOL, METERED RESPIRATORY (INHALATION)
Status: CANCELLED
Start: 2018-02-20

## 2018-02-15 RX ORDER — ALBUTEROL SULFATE 0.83 MG/ML
2.5 SOLUTION RESPIRATORY (INHALATION)
Status: CANCELLED | OUTPATIENT
Start: 2018-02-20

## 2018-02-15 RX ORDER — ALBUTEROL SULFATE 90 UG/1
1-2 AEROSOL, METERED RESPIRATORY (INHALATION)
Status: CANCELLED
Start: 2018-03-06

## 2018-02-15 RX ORDER — DIPHENHYDRAMINE HYDROCHLORIDE 50 MG/ML
50 INJECTION INTRAMUSCULAR; INTRAVENOUS
Status: CANCELLED
Start: 2018-02-20

## 2018-02-15 RX ORDER — LORAZEPAM 2 MG/ML
0.5 INJECTION INTRAMUSCULAR EVERY 4 HOURS PRN
Status: CANCELLED
Start: 2018-02-20

## 2018-02-15 RX ORDER — PALONOSETRON 0.05 MG/ML
0.25 INJECTION, SOLUTION INTRAVENOUS ONCE
Status: CANCELLED
Start: 2018-03-06

## 2018-02-15 RX ORDER — EPINEPHRINE 0.3 MG/.3ML
0.3 INJECTION SUBCUTANEOUS EVERY 5 MIN PRN
Status: CANCELLED | OUTPATIENT
Start: 2018-03-06

## 2018-02-15 RX ORDER — SODIUM CHLORIDE 9 MG/ML
1000 INJECTION, SOLUTION INTRAVENOUS CONTINUOUS PRN
Status: CANCELLED
Start: 2018-02-20

## 2018-02-15 RX ORDER — EPINEPHRINE 1 MG/ML
0.3 INJECTION, SOLUTION, CONCENTRATE INTRAVENOUS EVERY 5 MIN PRN
Status: CANCELLED | OUTPATIENT
Start: 2018-03-06

## 2018-02-15 NOTE — PROGRESS NOTES
Oncology/Hematology Visit Note  Feb 14, 2018    Reason for Visit: Follow up of resectable pancreatic cancer    History of Present Illness: Staging CT chest/abd/pelvis on 12/9/17 showed several small pulmonary nodules (largest 3mm), unchanged mass in the pancreatic tail, mildly prominent mesenteric nodes thought likely reactive, and small right hepatic lobe hypodensities. Abdominal MRI on 12/10/17 showed hepatic hemangiomas, no evidence of metastatic disease to the liver.   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     Kitty met with Dr Monk and discussed neoadjuvant chemotherapy with FOLFIRINOX, then surgery and then adjuvant chemotherapy. She was seen on 1/15/18 for cycle 1 and then hospitalized for pancreatitis on 1/20/18 where she was managed with IV fluds and pain control. GI took her to the OR to attempt pancreatic duct stent placement but unfortunately it was completely obstructed. They were able to place a pancreatic stent and perform an EUS cyst-gastrostomy with plans to repeat Xray a month following to ensure stent passage. She was d/c 1/27. Cycle 2 was given 2/5/18 (a week delayed due to hospitalization).     She went to ED on 2/11 with worsening abdominal pain and was worried about pancreatitis. W/u with CT and labs including lipase were negative. She had elevated WBC of 33K but had Neulasta on 2/8. She presents today in ED follow-up.     Interval History:  Mrs. Bangura returns to clinic today with her . Her abdominal pain is back to baseline. She took a few doses of dilaudid on Sunday and Monday and felt better. However, around 1 am this morning she started to have intense bone pain in her pelvis, tailbone, L-spine, sternum. She took tylenol initially and then took a dilaudid x 2, and an oxycodone prior to coming here. The pain is well-controlled now. She has been taking claritin daily since 2/5.  She also has been having jaw pain and cramping with chewing since chemo. She has some hand spasms and cramping as well. The cold sensitivity is worse than cycle 1 and she has more oropharyngeal symptoms.     Has some mild mucositis. No fevers/chills, no significant nausea. Her bowel movements have been constipated or loose. She has about 2-3 BMs per day. She is taking Creon with meals and is not having significant bloating or dyspepsia after meals. No cough, sore throat, SOB, CP, edema.     She really would like to avoid Neulasta again with cycle 3 if possible due to bone pain.     Review of Systems:  Patient denies fevers, chills, night sweats, unexplained weight changes, headaches, dizziness, vision or hearing changes, new lumps or bumps, chest pain, shortness of breath, cough, abdominal pain, nausea, vomiting, changes to bowel or bladder, swelling of extremities, bleeding issues, or rash.    Current Outpatient Prescriptions   Medication Sig Dispense Refill     amylase-lipase-protease (CREON) [id number] UNITS CPEP per EC capsule Take 2-3 with meals / 1-2 with snacks, up to 15 per day. 450 capsule 6     HYDROmorphone (DILAUDID) 2 MG tablet Take 1 tablet (2 mg) by mouth every 4 hours as needed for pain maximum 4 tablet(s) per day 25 tablet 0     Fluorouracil (ADURCIL) 5 GM/100ML SOLN        HYDROcodone-acetaminophen (NORCO) 5-325 MG per tablet   0     oxyCODONE IR (ROXICODONE) 5 MG tablet Take 1 tablet (5 mg) by mouth every 4 hours as needed for moderate to severe pain (Patient not taking: Reported on 1/31/2018) 30 tablet 0     ondansetron (ZOFRAN) 8 MG tablet Take 1 tablet (8 mg) by mouth every 8 hours as needed for nausea 30 tablet 0     LORazepam (ATIVAN) 0.5 MG tablet Take 1 tablet (0.5 mg) by mouth every 4 hours as needed (Anxiety, Nausea/Vomiting or Sleep) 30 tablet 2     prochlorperazine (COMPAZINE) 10 MG tablet Take 1 tablet (10 mg) by mouth every 6 hours as needed (Nausea/Vomiting) 30 tablet 2  "      Physical Examination:  BP 99/66 (BP Location: Right arm, Patient Position: Sitting, Cuff Size: Adult Regular)  Pulse 87  Temp 98.1  F (36.7  C) (Oral)  Ht 1.651 m (5' 5\")  Wt 72.3 kg (159 lb 6.4 oz)  SpO2 98%  BMI 26.53 kg/m2  Wt Readings from Last 10 Encounters:   02/14/18 72.3 kg (159 lb 6.4 oz)   02/14/18 72.3 kg (159 lb 6.4 oz)   02/11/18 72.6 kg (160 lb)   02/05/18 72.9 kg (160 lb 11.2 oz)   01/31/18 73.2 kg (161 lb 4.8 oz)   01/26/18 76.2 kg (168 lb)   01/15/18 76.6 kg (168 lb 14.4 oz)   01/12/18 76.2 kg (168 lb)   01/08/18 76.5 kg (168 lb 11.2 oz)   01/08/18 76.5 kg (168 lb 11.2 oz)     Constitutional: Well-appearing female in no acute distress.  Eyes: EOMI, PERRL. No scleral icterus.  ENT: Oral mucosa is moist without lesions or thrush.  Lymphatic: Neck is supple without cervical or supraclavicular lymphadenopathy.   Cardiovascular: Regular rate and rhythm. No murmurs, gallops, or rubs. No peripheral edema.  Respiratory: Clear to auscultation bilaterally. No wheezes or crackles.  Gastrointestinal: Bowel sounds present. Abdomen soft, mildly tender to palpation throughout. No palpable hepatosplenomegaly or masses.   Neurologic: Cranial nerves II through XII are grossly intact.  Skin: No rashes, petechiae, or bruising noted on exposed skin.    Laboratory Data:   2/14/2018 15:59   Sodium 136   Potassium 3.8   Chloride 104   Carbon Dioxide 24   Urea Nitrogen 19   Creatinine 0.66   GFR Estimate >90   GFR Estimate If Black >90   Calcium 8.7   Anion Gap 8   Albumin 3.3 (L)   Protein Total 7.0   Bilirubin Total 0.3   Alkaline Phosphatase 147   ALT 28   AST 20   Glucose 130 (H)   WBC 12.0 (H)   Hemoglobin 12.6   Hematocrit 38.5   Platelet Count 128 (L)   RBC Count 4.22   MCV 91   MCH 29.9   MCHC 32.7   RDW 14.6   Diff Method Manual Differential   % Neutrophils 36.8   % Lymphocytes 38.6   % Monocytes 14.9   % Eosinophils 0.9   % Basophils 0.9   % Metamyelocytes 0.9   % Myelocytes 2.6   % Promyelocytes 4.4 "   Absolute Neutrophil 4.4   Absolute Lymphocytes 4.6   Absolute Monocytes 1.8 (H)   Absolute Eosinophils 0.1   Absolute Basophils 0.1   Absolute Metamyelocytes 0.1 (H)   Absolute Myelocytes 0.3 (H)   Absolute Promyelocytes 0.5 (H)   RBC Morphology Normal   Platelet Estimate Confirming automa...       Assessment and Plan:  1. Pancreatic cancer  S/p 2 cycles of FOLFIRINOX, last given 2/5 with Neulasta support given neutropenia past day 14 with cycle 1. She is having progressive motor neuropathy and cold sensitivity. I do not think she is going to be able to tolerate full-dose oxaliplatin given these symptoms. Will dose reduce by 10% and give cycle 3 without Neulasta. She is aware she may have neutropenia again despite a dose reduction but would like to try this. Cycle 3 is due 2/20. She will see Kris Dudley prior. She has an abdominal XR that day as well to ensure passing of stent. Overall plan is to complete 4 cycles of FOLRIRNOX before re-imaging and discussing possible surgery.     2. Bone pain: Start taking Tylenol 1000 mg TID scheduled. Continue daily claritin. Okay to take oxycodone or dilaudid prn when pain starts to escalate. Discussed stretching and walking as well as using heat.     3. Motor neuropathy/cold sensitivity: Dose reducing oxaliplatin next cycle as above. Avoid cold triggers and apply heat to help with cold symptoms. Stretching of jaw and hands for motor neuropathy.    4. GI: Continue Creon. Start fiber supplement to help bowel irregularity. Imodium prn for diarrhea and Senna or Miralax prn for constipation. Nausea has been well-controlled. Abdominal pain is back to baseline.    5. Mucositis: Salt and soda rinses BID to TID prn.     Kellie Nobles PA-C  [facility]  [address] [place] [address] [phone]

## 2018-02-20 ENCOUNTER — APPOINTMENT (OUTPATIENT)
Dept: LAB | Facility: CLINIC | Age: 58
End: 2018-02-20
Attending: INTERNAL MEDICINE
Payer: COMMERCIAL

## 2018-02-20 ENCOUNTER — INFUSION THERAPY VISIT (OUTPATIENT)
Dept: ONCOLOGY | Facility: CLINIC | Age: 58
End: 2018-02-20
Attending: INTERNAL MEDICINE
Payer: COMMERCIAL

## 2018-02-20 ENCOUNTER — ONCOLOGY VISIT (OUTPATIENT)
Dept: ONCOLOGY | Facility: CLINIC | Age: 58
End: 2018-02-20
Attending: PHYSICIAN ASSISTANT
Payer: COMMERCIAL

## 2018-02-20 ENCOUNTER — RADIANT APPOINTMENT (OUTPATIENT)
Dept: GENERAL RADIOLOGY | Facility: CLINIC | Age: 58
End: 2018-02-20
Payer: COMMERCIAL

## 2018-02-20 VITALS
SYSTOLIC BLOOD PRESSURE: 103 MMHG | DIASTOLIC BLOOD PRESSURE: 72 MMHG | BODY MASS INDEX: 26.86 KG/M2 | TEMPERATURE: 97.7 F | RESPIRATION RATE: 16 BRPM | WEIGHT: 161.4 LBS | HEART RATE: 66 BPM | OXYGEN SATURATION: 98 %

## 2018-02-20 DIAGNOSIS — C25.9 PANCREATIC ADENOCARCINOMA (H): Primary | ICD-10-CM

## 2018-02-20 DIAGNOSIS — K85.91 NECROTIZING PANCREATITIS: ICD-10-CM

## 2018-02-20 LAB
ALBUMIN SERPL-MCNC: 3.3 G/DL (ref 3.4–5)
ALP SERPL-CCNC: 176 U/L (ref 40–150)
ALT SERPL W P-5'-P-CCNC: 42 U/L (ref 0–50)
ANION GAP SERPL CALCULATED.3IONS-SCNC: 7 MMOL/L (ref 3–14)
ANISOCYTOSIS BLD QL SMEAR: SLIGHT
AST SERPL W P-5'-P-CCNC: 33 U/L (ref 0–45)
BASOPHILS # BLD AUTO: 0 10E9/L (ref 0–0.2)
BASOPHILS NFR BLD AUTO: 0 %
BILIRUB SERPL-MCNC: 0.2 MG/DL (ref 0.2–1.3)
BUN SERPL-MCNC: 17 MG/DL (ref 7–30)
CALCIUM SERPL-MCNC: 8.8 MG/DL (ref 8.5–10.1)
CHLORIDE SERPL-SCNC: 108 MMOL/L (ref 94–109)
CO2 SERPL-SCNC: 26 MMOL/L (ref 20–32)
CREAT SERPL-MCNC: 0.65 MG/DL (ref 0.52–1.04)
DIFFERENTIAL METHOD BLD: ABNORMAL
EOSINOPHIL # BLD AUTO: 0 10E9/L (ref 0–0.7)
EOSINOPHIL NFR BLD AUTO: 0 %
ERYTHROCYTE [DISTWIDTH] IN BLOOD BY AUTOMATED COUNT: 15.6 % (ref 10–15)
GFR SERPL CREATININE-BSD FRML MDRD: >90 ML/MIN/1.7M2
GLUCOSE SERPL-MCNC: 124 MG/DL (ref 70–99)
HCT VFR BLD AUTO: 37.9 % (ref 35–47)
HGB BLD-MCNC: 12.4 G/DL (ref 11.7–15.7)
LYMPHOCYTES # BLD AUTO: 4 10E9/L (ref 0.8–5.3)
LYMPHOCYTES NFR BLD AUTO: 22.6 %
MCH RBC QN AUTO: 30.1 PG (ref 26.5–33)
MCHC RBC AUTO-ENTMCNC: 32.7 G/DL (ref 31.5–36.5)
MCV RBC AUTO: 92 FL (ref 78–100)
METAMYELOCYTES # BLD: 0.3 10E9/L
METAMYELOCYTES NFR BLD MANUAL: 1.7 %
MONOCYTES # BLD AUTO: 1.4 10E9/L (ref 0–1.3)
MONOCYTES NFR BLD AUTO: 7.8 %
MYELOCYTES # BLD: 0.2 10E9/L
MYELOCYTES NFR BLD MANUAL: 0.9 %
NEUTROPHILS # BLD AUTO: 11.7 10E9/L (ref 1.6–8.3)
NEUTROPHILS NFR BLD AUTO: 67 %
NRBC # BLD AUTO: 0.2 10*3/UL
NRBC BLD AUTO-RTO: 1 /100
PLATELET # BLD AUTO: 127 10E9/L (ref 150–450)
PLATELET # BLD EST: ABNORMAL 10*3/UL
POLYCHROMASIA BLD QL SMEAR: SLIGHT
POTASSIUM SERPL-SCNC: 4 MMOL/L (ref 3.4–5.3)
PROT SERPL-MCNC: 6.9 G/DL (ref 6.8–8.8)
RBC # BLD AUTO: 4.12 10E12/L (ref 3.8–5.2)
SODIUM SERPL-SCNC: 141 MMOL/L (ref 133–144)
WBC # BLD AUTO: 17.5 10E9/L (ref 4–11)

## 2018-02-20 PROCEDURE — 25000128 H RX IP 250 OP 636: Mod: ZF | Performed by: PHYSICIAN ASSISTANT

## 2018-02-20 PROCEDURE — 99214 OFFICE O/P EST MOD 30 MIN: CPT | Mod: ZP | Performed by: PHYSICIAN ASSISTANT

## 2018-02-20 PROCEDURE — 85025 COMPLETE CBC W/AUTO DIFF WBC: CPT | Performed by: PHYSICIAN ASSISTANT

## 2018-02-20 PROCEDURE — 96417 CHEMO IV INFUS EACH ADDL SEQ: CPT

## 2018-02-20 PROCEDURE — 96413 CHEMO IV INFUSION 1 HR: CPT

## 2018-02-20 PROCEDURE — 80053 COMPREHEN METABOLIC PANEL: CPT | Performed by: PHYSICIAN ASSISTANT

## 2018-02-20 PROCEDURE — 96416 CHEMO PROLONG INFUSE W/PUMP: CPT

## 2018-02-20 PROCEDURE — 96411 CHEMO IV PUSH ADDL DRUG: CPT

## 2018-02-20 PROCEDURE — 96375 TX/PRO/DX INJ NEW DRUG ADDON: CPT

## 2018-02-20 PROCEDURE — 96368 THER/DIAG CONCURRENT INF: CPT

## 2018-02-20 PROCEDURE — 96415 CHEMO IV INFUSION ADDL HR: CPT

## 2018-02-20 RX ORDER — HEPARIN SODIUM (PORCINE) LOCK FLUSH IV SOLN 100 UNIT/ML 100 UNIT/ML
5 SOLUTION INTRAVENOUS
Status: COMPLETED | OUTPATIENT
Start: 2018-02-20 | End: 2018-02-20

## 2018-02-20 RX ORDER — PALONOSETRON 0.05 MG/ML
0.25 INJECTION, SOLUTION INTRAVENOUS ONCE
Status: COMPLETED | OUTPATIENT
Start: 2018-02-20 | End: 2018-02-20

## 2018-02-20 RX ORDER — DEXAMETHASONE 4 MG/1
8 TABLET ORAL
Qty: 12 TABLET | Refills: 2 | Status: ON HOLD | OUTPATIENT
Start: 2018-02-20 | End: 2018-04-22

## 2018-02-20 RX ORDER — FLUOROURACIL 50 MG/ML
400 INJECTION, SOLUTION INTRAVENOUS ONCE
Status: COMPLETED | OUTPATIENT
Start: 2018-02-20 | End: 2018-02-20

## 2018-02-20 RX ADMIN — DEXAMETHASONE SODIUM PHOSPHATE 12 MG: 10 INJECTION, SOLUTION INTRAMUSCULAR; INTRAVENOUS at 07:51

## 2018-02-20 RX ADMIN — DEXTROSE MONOHYDRATE 340 MG: 50 INJECTION, SOLUTION INTRAVENOUS at 10:41

## 2018-02-20 RX ADMIN — PALONOSETRON HYDROCHLORIDE 0.25 MG: 0.25 INJECTION INTRAVENOUS at 07:49

## 2018-02-20 RX ADMIN — ATROPINE SULFATE 0.4 MG: 0.4 INJECTION, SOLUTION INTRAMUSCULAR; INTRAVENOUS; SUBCUTANEOUS at 10:32

## 2018-02-20 RX ADMIN — DEXTROSE 500 ML: 5 SOLUTION INTRAVENOUS at 07:49

## 2018-02-20 RX ADMIN — LEUCOVORIN CALCIUM 750 MG: 350 INJECTION, POWDER, LYOPHILIZED, FOR SOLUTION INTRAMUSCULAR; INTRAVENOUS at 10:38

## 2018-02-20 RX ADMIN — OXALIPLATIN 150 MG: 5 INJECTION INTRAVENOUS at 08:15

## 2018-02-20 RX ADMIN — SODIUM CHLORIDE, PRESERVATIVE FREE 5 ML: 5 INJECTION INTRAVENOUS at 06:34

## 2018-02-20 RX ADMIN — FLUOROURACIL 750 MG: 50 INJECTION, SOLUTION INTRAVENOUS at 12:22

## 2018-02-20 ASSESSMENT — PAIN SCALES - GENERAL: PAINLEVEL: NO PAIN (0)

## 2018-02-20 NOTE — PATIENT INSTRUCTIONS
[facility] Main Line: [phone]    Call triage nurse with chills and/or temperature greater than or equal to 100.4, uncontrolled nausea/vomiting, diarrhea, constipation, dizziness, shortness of breath, chest pain, bleeding, unexplained bruising, or any new/concerning symptoms, questions/concerns.   If you are having any concerning symptoms or wish to speak to a provider before your next infusion visit, please call your care coordinator or triage to notify them so we can adequately serve you.   Triage Nurse Line: [phone]    If after hours, weekends, or holidays, call main hospital  and ask for Oncology doctor on call @ [phone]               February 2018 Sunday Monday Tuesday Wednesday Thursday Friday Saturday                       1     2     3       4     5     [facility] DRAW    7:45 AM   (15 min.)   [facility]   [facility] Lab Draw     [facility] RETURN    8:05 AM   (50 min.)   Kris Dudley PA   [facility] ONC INFUSION 360    9:30 AM   (360 min.)    ONCOLOGY INFUSION   [facility] 6     7     LEVEL 0   12:30 PM   (30 min.)   ROOM 9 [facility] Cancer Infusion 8     9     10       11     Admission   11:40 AM   Keshia Cunningham MD   [facility], [place], Emergency Department   (Discharge: 2/11/2018)     CT ABDOMEN PELVIS W    1:55 PM   (30 min.)   [id number]   [facility], [place], CT 12     13     14     [facility]    3:45 PM   (15 min.)   [facility]   [facility] Draw     [facility] RETURN    4:05 PM   (50 min.)   Kellie Nobles PA-C   [facility] 15     16     17       18     19     20     [facility] D[facility]    6:30 AM   (15 min.)   [facility]   [facility] Lab Draw     [facility] INFUSION 360    7:00 AM   (360 min.)   [facility] ONCOLOGY INFUSION   [facility]     [facility] RETURN    8:05 AM   (50 min.)   Kris Dudley PA   [facility]  Cancer Clinic     XR ABDOMEN 2 VIEWS    2:00 PM   (15 min.)   [id number]   [facility] Xray 21     22    [facility] D/C at [facility] with On-Body Neulasta at 10:30 AM 23     24       25     26     27     28 March 2018 Sunday Monday Tuesday Wednesday Thursday Friday Saturday                       1     2     3       4     5     6     [facility] D[facility]    7:15 AM   (15 min.)    [facility] D[facility]   [facility] Draw     [facility] RETURN    7:35 AM   (50 min.)   Kellie Nobles PA-C   [facility]     [facility] ONC INFUSION 360    9:00 AM   (360 min.)   [facility] ONCOLOGY INFUSION   [facility] 7     8     9     10       11     12     13     LEVEL 0    9:00 AM   (30 min.)   ROOM 5 [facility] Cancer Infusion     CT CHEST/ABDOMEN/PELVIS W    9:45 AM   (30 min.)   [id number] [place] [address] CT 14     15     16     17       18     19     [facility] DRAW    7:00 AM   (15 min.)    [facility] DRAW   [facility] Draw     [facility] RETURN    7:30 AM   (30 min.)   [facility] Monk [facility]    8:30 AM   (360 min.)   [facility] ONCOLOGY INFUSION   [facility] 20     21     22     23     24       25     26     27     28     29     30     31                  Lab Results:  Recent Results (from the past 12 hour(s))   CBC with platelets differential    Collection Time: 02/20/18  6:43 AM   Result Value Ref Range    WBC 17.5 (H) 4.0 - 11.0 10e9/L    RBC Count 4.12 3.8 - 5.2 10e12/L    Hemoglobin 12.4 11.7 - 15.7 g/dL    Hematocrit 37.9 35.0 - 47.0 %    MCV 92 78 - 100 fl    MCH 30.1 26.5 - 33.0 pg    MCHC 32.7 31.5 - 36.5 g/dL    RDW 15.6 (H) 10.0 - 15.0 %    Platelet Count 127 (L) 150 - 450 10e9/L    Diff Method Manual Differential     % Neutrophils 67.0 %    % Lymphocytes 22.6 %    % Monocytes 7.8 %    % Eosinophils 0.0 %    % Basophils 0.0 %    % Metamyelocytes 1.7 %    % Myelocytes 0.9 %    Nucleated  RBCs 1 (H) 0 /100    Absolute Neutrophil 11.7 (H) 1.6 - 8.3 10e9/L    Absolute Lymphocytes 4.0 0.8 - 5.3 10e9/L    Absolute Monocytes 1.4 (H) 0.0 - 1.3 10e9/L    Absolute Eosinophils 0.0 0.0 - 0.7 10e9/L    Absolute Basophils 0.0 0.0 - 0.2 10e9/L    Absolute Metamyelocytes 0.3 (H) 0 10e9/L    Absolute Myelocytes 0.2 (H) 0 10e9/L    Absolute Nucleated RBC 0.2     Anisocytosis Slight     Polychromasia Slight     Platelet Estimate Confirming automated cell count    Comprehensive metabolic panel    Collection Time: 02/20/18  6:43 AM   Result Value Ref Range    Sodium 141 133 - 144 mmol/L    Potassium 4.0 3.4 - 5.3 mmol/L    Chloride 108 94 - 109 mmol/L    Carbon Dioxide 26 20 - 32 mmol/L    Anion Gap 7 3 - 14 mmol/L    Glucose 124 (H) 70 - 99 mg/dL    Urea Nitrogen 17 7 - 30 mg/dL    Creatinine 0.65 0.52 - 1.04 mg/dL    GFR Estimate >90 >60 mL/min/1.7m2    GFR Estimate If Black >90 >60 mL/min/1.7m2    Calcium 8.8 8.5 - 10.1 mg/dL    Bilirubin Total 0.2 0.2 - 1.3 mg/dL    Albumin 3.3 (L) 3.4 - 5.0 g/dL    Protein Total 6.9 6.8 - 8.8 g/dL    Alkaline Phosphatase 176 (H) 40 - 150 U/L    ALT 42 0 - 50 U/L    AST 33 0 - 45 U/L

## 2018-02-20 NOTE — MR AVS SNAPSHOT
After Visit Summary   2/20/2018    Kitty Bangura    MRN: [id number]           Patient Information     Date Of Birth          1960        Visit Information        Provider Department      2/20/2018 7:00 AM UC 10 ATC;  ONCOLOGY INFUSION [facility]        Today's Diagnoses     Pancreatic adenocarcinoma (H)    -  1      [facility] Main Line: [phone]    Call triage nurse with chills and/or temperature greater than or equal to 100.4, uncontrolled nausea/vomiting, diarrhea, constipation, dizziness, shortness of breath, chest pain, bleeding, unexplained bruising, or any new/concerning symptoms, questions/concerns.   If you are having any concerning symptoms or wish to speak to a provider before your next infusion visit, please call your care coordinator or triage to notify them so we can adequately serve you.   Triage Nurse Line: [phone]    If after hours, weekends, or holidays, call main hospital  and ask for Oncology doctor on call @ [phone]               February 2018 Sunday Monday Tuesday Wednesday Thursday Friday Saturday                       1     2     3       4     5     [facility] MASONIC LAB DRAW    7:45 AM   (15 min.)    MASONIC LAB DRAW   [facility] Lab Draw     [facility] RETURN    8:05 AM   (50 min.)   Kris Dudley PA   [facility] ONC INFUSION 360    9:30 AM   (360 min.)    ONCOLOGY INFUSION   [facility] 6     7     LEVEL 0   12:30 PM   (30 min.)   ROOM 9 [facility] Cancer Infusion 8     9     10       11     Admission   11:40 AM   Keshia Cunningham MD   [facility] [place], Emergency Department   (Discharge: 2/11/2018)     CT ABDOMEN PELVIS W    1:55 PM   (30 min.)   UUCT4   [place], [place] 12     13     14     P MASONIC LAB DRAW    3:45 PM   (15 min.)    MASONIC LAB DRAW   [facility] Masonic Lab Draw     P RETURN    4:05 PM   (50 min.)    Kellie Nobles PA-C   [facility] 15     16     17       18     19     20     [facility] DRAW    6:30 AM   (15 min.)   [facility] DRAW   [facility] Masonic Lab Draw     [facility] ONC INFUSION 360    7:00 AM   (360 min.)   [facility] ONCOLOGY INFUSION   [facility]     [facility] RETURN    8:05 AM   (50 min.)   Kris Dudley PA   [facility]     XR ABDOMEN 2 VIEWS    2:00 PM   (15 min.)   UCXR1   [facility] Xray 21     22    [facility]ump D/C at [facility] with On-Body Neulasta at 10:30 AM 23     24       25     26     27     28 March 2018 Sunday Monday Tuesday Wednesday Thursday Friday Saturday                       1     2     3       4     5     6     [facility] LAB DRAW    7:15 AM   (15 min.)    MASONIC LAB DRAW   [facility] Lab Draw     [facility] RETURN    7:35 AM   (50 min.)   Kellie Nobles PA-C   [facility]     [facility] ONC INFUSION 360    9:00 AM   (360 min.)   UC ONCOLOGY INFUSION   [facility] 7     8     9     10       11     12     13     LEVEL 0    9:00 AM   (30 min.)   ROOM 5 [facility] Cancer Infusion     CT CHEST/ABDOMEN/PELVIS W    9:45 AM   (30 min.)   [address] 14     15     16     17       18     19     [facility] LAB DRAW    7:00 AM   (15 min.)    [facility]   [facility]     [facility] RETURN    7:30 AM   (30 min.)   Jovany Monk MD   [facility]     [facility] ONC INFUSION 360    8:30 AM   (360 min.)   UC ONCOLOGY INFUSION   [facility] 20     21     22     23     24       25     26     27     28     29     30     31                  Lab Results:  Recent Results (from the past 12 hour(s))   CBC with platelets differential    Collection Time: 02/20/18  6:43 AM   Result Value Ref Range    WBC 17.5 (H) 4.0 - 11.0 10e9/L    RBC Count 4.12 3.8 - 5.2 10e12/L    Hemoglobin 12.4 11.7 - 15.7 g/dL     Hematocrit 37.9 35.0 - 47.0 %    MCV 92 78 - 100 fl    MCH 30.1 26.5 - 33.0 pg    MCHC 32.7 31.5 - 36.5 g/dL    RDW 15.6 (H) 10.0 - 15.0 %    Platelet Count 127 (L) 150 - 450 10e9/L    Diff Method Manual Differential     % Neutrophils 67.0 %    % Lymphocytes 22.6 %    % Monocytes 7.8 %    % Eosinophils 0.0 %    % Basophils 0.0 %    % Metamyelocytes 1.7 %    % Myelocytes 0.9 %    Nucleated RBCs 1 (H) 0 /100    Absolute Neutrophil 11.7 (H) 1.6 - 8.3 10e9/L    Absolute Lymphocytes 4.0 0.8 - 5.3 10e9/L    Absolute Monocytes 1.4 (H) 0.0 - 1.3 10e9/L    Absolute Eosinophils 0.0 0.0 - 0.7 10e9/L    Absolute Basophils 0.0 0.0 - 0.2 10e9/L    Absolute Metamyelocytes 0.3 (H) 0 10e9/L    Absolute Myelocytes 0.2 (H) 0 10e9/L    Absolute Nucleated RBC 0.2     Anisocytosis Slight     Polychromasia Slight     Platelet Estimate Confirming automated cell count    Comprehensive metabolic panel    Collection Time: 02/20/18  6:43 AM   Result Value Ref Range    Sodium 141 133 - 144 mmol/L    Potassium 4.0 3.4 - 5.3 mmol/L    Chloride 108 94 - 109 mmol/L    Carbon Dioxide 26 20 - 32 mmol/L    Anion Gap 7 3 - 14 mmol/L    Glucose 124 (H) 70 - 99 mg/dL    Urea Nitrogen 17 7 - 30 mg/dL    Creatinine 0.65 0.52 - 1.04 mg/dL    GFR Estimate >90 >60 mL/min/1.7m2    GFR Estimate If Black >90 >60 mL/min/1.7m2    Calcium 8.8 8.5 - 10.1 mg/dL    Bilirubin Total 0.2 0.2 - 1.3 mg/dL    Albumin 3.3 (L) 3.4 - 5.0 g/dL    Protein Total 6.9 6.8 - 8.8 g/dL    Alkaline Phosphatase 176 (H) 40 - 150 U/L    ALT 42 0 - 50 U/L    AST 33 0 - 45 U/L             Follow-ups after your visit        Your next 10 appointments already scheduled     Feb 20, 2018  2:00 PM CST   XR ABDOMEN 2 VIEWS with UCXR1   [facility] Xray ([facility])    [address] [address] [phone]           Please bring a list of your current medicines to your exam. (Include vitamins, minerals and over-thecounter  medicines.) Leave your valuables at home.  Tell your doctor if there is a chance you may be pregnant.  You do not need to do anything special for this exam.            Mar 06, 2018  7:15 AM CST   Masonic Lab Draw with UC MASONIC LAB DRAW   [facility] Lab Draw ([facility])    [address]  [address]  [facility] [address]   [phone]            Mar 06, 2018  7:50 AM CST   (Arrive by 7:35 AM)   Return Visit with Kellie Nobles PA-C   [facility] Cancer [facility] ([facility])    [address]  [address]  [facility] [address]   [phone]            Mar 06, 2018  9:00 AM CST   Infusion 360 with  ONCOLOGY INFUSION, UC 19 ATC   [facility] Cancer [facility] ([facility])    [address]  [address]  [facility] [address]   [phone]            Mar 13, 2018  9:00 AM CDT   Level O with ROOM 5 [facility] Cancer Infusion ([facility])    [facility]  [address]  [facility] [address]   [phone]            Mar 13, 2018 10:00 AM CDT   (Arrive by 9:45 AM)   CT CHEST/ABDOMEN/PELVIS W CONTRAST with WYCT1   [facility] CT ([facility])    [address]  [facility] [address]   [phone]           Please bring any scans or X-rays taken at other hospitals, if similar tests were done. Also bring a list of your medicines, including vitamins, minerals and over-the-counter drugs. It is safest to leave personal items at home.  Be sure to tell your doctor:   If you have any allergies.   If there s any chance you are pregnant.   If you are breastfeeding.  You may have contrast for this exam. To prepare:   Do not eat or drink for 2 hours before your exam. If you need to take medicine, you may take it with small sips of water. (We may ask you to take liquid medicine as well.)   The day before your exam, drink extra fluids at  least six 8-ounce glasses (unless your doctor tells you to restrict your fluids).   You will be given instructions on how to drink the contrast.  Patients over 70 or patients with diabetes or kidney problems:   If you haven t had a blood test (creatinine test) within the last 30 days, the Cardiologist/Radiologist may require you to get this test prior to your exam.  If you have diabetes:   Continue to take your metformin medication on the day of your exam  Please wear loose clothing, such as a sweat suit or jogging clothes. Avoid snaps, zippers and other metal. We may ask you to undress and put on a hospital gown.  If you have any questions, please call the Imaging Department where you will have your exam.            Mar 19, 2018  7:00 AM CDT   Masonic Lab Draw with  MASONIC LAB DRAW   [facility] Lab Draw ([facility])    [address] [place] [address]  [address] [place] [address] [phone]            Mar 19, 2018  7:45 AM CDT   (Arrive by 7:30 AM)   Return Visit with Jovany Monk MD   [facility] ([facility])    [address] [place] [address]  [address]  [facility] [id number] [phone]            Mar 19, 2018  8:30 AM CDT   Infusion 360 with  ONCOLOGY INFUSION   [facility] ([facility])    [address] [place] [address]  [address] [place] [address] [phone]              Who to contact     If you have questions or need follow up information about today's clinic visit or your schedule please contact [facility] directly at [phone].  Normal or non-critical lab and imaging results will be communicated to you by MyChart, letter or phone within 4 business days after the clinic has received the results. If you do not hear from us within 7 days, please contact the clinic through MyChart or phone. If you have a critical or abnormal lab result, we will notify you by phone as  soon as possible.  Submit refill requests through [employer] or call your pharmacy and they will forward the refill request to us. Please allow 3 business days for your refill to be completed.          Additional Information About Your Visit        [employer] Information     [employer] gives you secure access to your electronic health record. If you see a primary care provider, you can also send messages to your care team and make appointments. If you have questions, please call your primary care clinic.  If you do not have a primary care provider, please call [phone] and they will assist you.        Care EveryWhere ID     This is your Care EveryWhere ID. This could be used by other organizations to access your [place] medical records  [id number]         Blood Pressure from Last 3 Encounters:   02/20/18 103/72   02/14/18 99/66   02/14/18 103/65    Weight from Last 3 Encounters:   02/20/18 73.2 kg (161 lb 6.4 oz)   02/14/18 72.3 kg (159 lb 6.4 oz)   02/14/18 72.3 kg (159 lb 6.4 oz)              We Performed the Following     CBC with platelets differential     Comprehensive metabolic panel          Today's Medication Changes          These changes are accurate as of 2/20/18 12:42 PM.  If you have any questions, ask your nurse or doctor.               Start taking these medicines.        Dose/Directions    dexamethasone 4 MG tablet   Commonly known as:  DECADRON   Used for:  Pancreatic adenocarcinoma (H)        Dose:  8 mg   Take 2 tablets (8 mg) by mouth daily (with breakfast)   Quantity:  12 tablet   Refills:  2            Where to get your medicines      These medications were sent to [facility] [address] [address]    Hours:  TRANSPLANT PHONE NUMBER [phone] Phone:  [phone]     dexamethasone 4 MG tablet                Primary Care Provider Office Phone # Fax #    Solange Mcgill [phone]  [phone]       [address] [address]        Equal Access to Services     CHASE DE GUZMAN : Hadii aad ku hadmarileenina Lizpaulo, wajosephineda lubulmaroadaha, qaybta kaelderda chrissdavidfredy, re sabillon modestawilliams hernandezcheng verenicefrancoisenicolás yip. So [facility] [phone].    ATENCIÓN: Si habla español, tiene a quiros disposición servicios gratuitos de asistencia lingüística. Nasirame al [phone].    We comply with applicable federal civil rights laws and [place] laws. We do not discriminate on the basis of race, color, national origin, age, disability, sex, sexual orientation, or gender identity.            Thank you!     Thank you for choosing [facility]  for your care. Our goal is always to provide you with excellent care. Hearing back from our patients is one way we can continue to improve our services. Please take a few minutes to complete the written survey that you may receive in the mail after your visit with us. Thank you!             Your Updated Medication List - Protect others around you: Learn how to safely use, store and throw away your medicines at www.disposemymeds.org.          This list is accurate as of 2/20/18 12:42 PM.  Always use your most recent med list.                   Brand Name Dispense Instructions for use Diagnosis    amylase-lipase-protease [id number] UNITS Cpep per EC capsule    CREON    450 capsule    Take 2-3 with meals / 1-2 with snacks, up to 15 per day.    Necrotizing pancreatitis       dexamethasone 4 MG tablet    DECADRON    12 tablet    Take 2 tablets (8 mg) by mouth daily (with breakfast)    Pancreatic adenocarcinoma (H)       Fluorouracil 5 GM/100ML Soln    ADURCIL          HYDROcodone-acetaminophen 5-325 MG per tablet    NORCO          HYDROmorphone 2 MG tablet    DILAUDID    25 tablet    Take 1 tablet (2 mg) by mouth every 4 hours as needed for pain maximum 4 tablet(s) per day        LORazepam 0.5 MG tablet    ATIVAN    30 tablet    Take 1 tablet (0.5 mg) by mouth every 4 hours  as needed (Anxiety, Nausea/Vomiting or Sleep)    Pancreatic adenocarcinoma (H)       ondansetron 8 MG tablet    ZOFRAN    30 tablet    Take 1 tablet (8 mg) by mouth every 8 hours as needed for nausea    Pancreatic adenocarcinoma (H)       oxyCODONE IR 5 MG tablet    ROXICODONE    30 tablet    Take 1 tablet (5 mg) by mouth every 4 hours as needed for moderate to severe pain    Necrotizing pancreatitis, Pancreatic adenocarcinoma (H)       prochlorperazine 10 MG tablet    COMPAZINE    30 tablet    Take 1 tablet (10 mg) by mouth every 6 hours as needed (Nausea/Vomiting)    Pancreatic adenocarcinoma (H)

## 2018-02-20 NOTE — LETTER
2/20/2018      RE: Kitty Bangura  [id number] [facility] [id number]       Oncology/Hematology Visit Note  Feb 20, 2018    Reason for Visit: Follow up of resectable pancreatic cancer    History of Present Illness: Staging CT chest/abd/pelvis on 12/9/17 showed several small pulmonary nodules (largest 3mm), unchanged mass in the pancreatic tail, mildly prominent mesenteric nodes thought likely reactive, and small right hepatic lobe hypodensities. Abdominal MRI on 12/10/17 showed hepatic hemangiomas, no evidence of metastatic disease to the liver.   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      Kitty met with Dr Monk and discussed neoadjuvant chemotherapy with FOLFIRINOX, then surgery and then adjuvant chemotherapy. She was seen on 1/15/18 for cycle 1 and then hospitalized for pancreatitis on 1/20/18 where she was managed with IV fluds and pain control. GI took her to the OR to attempt pancreatic duct stent placement but unfortunately it was completely obstructed. They were able to place a pancreatic stent and perform an EUS cyst-gastrostomy with plans to repeat Xray a month following to ensure stent passage. She was d/c 1/27. Cycle 2 was given 2/5/18 (a week delayed due to hospitalization).      She went to ED on 2/11 with worsening abdominal pain and was worried about pancreatitis. W/u with CT and labs including lipase were negative. She had elevated WBC of 33K but had Neulasta on 2/8. She was discharged home after improving with IV pain medications. She was seen last week for hospital follow-up where she noted progressive motor neuropathy/cold sensitivity and bone pain. As such it was recommended we dose reduce Oxaliplatin to 75 mg/m2 and hold Neulasta for cycle 3 which she returns for today.     Interval History:  Ms. Bangura was seen today with her . She states she feels great today, the best she  has since October. She states her prior abdominal pain has resolved and she is not needing any pain medications. Her bony pains also greatly improved with scheduling Tylenol and continuing Claritin. She states her bowels are moving normally and she is digesting well. Her jaw cramping has now resolved and she has faint numbness/tingling in her fingertips that is much improved compared to the first week after treatment. She still has cold sensitivity. Her mouth sores got worse last Thursday after she was seen in clinic but these improved with salt/soda swishes and coconut oil and did not interfere with her oral intake. She denies any nausea and states that nausea she did have with treatment was well controlled with compazine.    She continues to have mild rhinorrhea but no headaches, sinus tenderness, sore throat, cough, chest pain, or shortness of breath. No urinary concerns. No fevers, chills, or night sweats. No peripheral edema, headaches, dizziness, vision or hearing changes. She does have mild eczema on the dorsal aspect of both hands and has been trying to keep them well moisturized. She did recently start using a alcohol based hand  due to the water being to cold to wash her hands over night.     Current Outpatient Prescriptions   Medication Sig Dispense Refill     HYDROmorphone (DILAUDID) 2 MG tablet Take 1 tablet (2 mg) by mouth every 4 hours as needed for pain maximum 4 tablet(s) per day 25 tablet 0     Fluorouracil (ADURCIL) 5 GM/100ML SOLN        HYDROcodone-acetaminophen (NORCO) 5-325 MG per tablet   0     oxyCODONE IR (ROXICODONE) 5 MG tablet Take 1 tablet (5 mg) by mouth every 4 hours as needed for moderate to severe pain (Patient not taking: Reported on 1/31/2018) 30 tablet 0     ondansetron (ZOFRAN) 8 MG tablet Take 1 tablet (8 mg) by mouth every 8 hours as needed for nausea 30 tablet 0     LORazepam (ATIVAN) 0.5 MG tablet Take 1 tablet (0.5 mg) by mouth every 4 hours as needed (Anxiety,  Nausea/Vomiting or Sleep) 30 tablet 2     prochlorperazine (COMPAZINE) 10 MG tablet Take 1 tablet (10 mg) by mouth every 6 hours as needed (Nausea/Vomiting) 30 tablet 2     amylase-lipase-protease (CREON) 28940-30851 UNITS CPEP per EC capsule Take 2-3 with meals / 1-2 with snacks, up to 15 per day. 450 capsule 6       Physical Examination:  /72 (BP Location: Right arm, Patient Position: Sitting, Cuff Size: Adult Regular)  Pulse 66  Temp 97.7  F (36.5  C) (Oral)  Resp 16  Wt 73.2 kg (161 lb 6.4 oz)  SpO2 98%  BMI 26.86 kg/m2  Wt Readings from Last 10 Encounters:   02/20/18 73.2 kg (161 lb 6.4 oz)   02/14/18 72.3 kg (159 lb 6.4 oz)   02/14/18 72.3 kg (159 lb 6.4 oz)   02/11/18 72.6 kg (160 lb)   02/05/18 72.9 kg (160 lb 11.2 oz)   01/31/18 73.2 kg (161 lb 4.8 oz)   01/26/18 76.2 kg (168 lb)   01/15/18 76.6 kg (168 lb 14.4 oz)   01/12/18 76.2 kg (168 lb)   01/08/18 76.5 kg (168 lb 11.2 oz)     Constitutional: Well-appearing female in no acute distress.  Eyes: EOMI, PERRL. No scleral icterus.  ENT: Oral mucosa is moist without lesions or thrush.   Lymphatic: Neck is supple without cervical or supraclavicular lymphadenopathy.   Cardiovascular: Regular rate and rhythm. No murmurs, gallops, or rubs. No peripheral edema.  Respiratory: Clear to auscultation bilaterally. No wheezes or crackles.  Gastrointestinal: Bowel sounds present. Abdomen soft, non-tender. No palpable hepatosplenomegaly or masses.   Neurologic: Cranial nerves II through XII are grossly intact.  Skin: Mild dry skin and faint erythematous pustules on back of both hands. No involvement of palms. No other rashes, petechiae, or bruising noted on exposed skin.    Laboratory Data:  Results for EZEQUIEL CHAVIS (MRN [id number]) as of 2/20/2018 08:31   2/20/2018 06:43   Sodium 141   Potassium 4.0   Chloride 108   Carbon Dioxide 26   Urea Nitrogen 17   Creatinine 0.65   GFR Estimate >90   GFR Estimate If Black >90   Calcium 8.8   Anion Gap 7   Albumin  3.3 (L)   Protein Total 6.9   Bilirubin Total 0.2   Alkaline Phosphatase 176 (H)   ALT 42   AST 33   Glucose 124 (H)   WBC 17.5 (H)   Hemoglobin 12.4   Hematocrit 37.9   Platelet Count 127 (L)   RBC Count 4.12   MCV 92   MCH 30.1   MCHC 32.7   RDW 15.6 (H)   Diff Method Manual Differential   % Neutrophils 67.0   % Lymphocytes 22.6   % Monocytes 7.8   % Eosinophils 0.0   % Basophils 0.0   % Metamyelocytes 1.7   % Myelocytes 0.9   Nucleated RBCs 1 (H)   Absolute Neutrophil 11.7 (H)   Absolute Lymphocytes 4.0   Absolute Monocytes 1.4 (H)   Absolute Eosinophils 0.0   Absolute Basophils 0.0   Absolute Metamyelocytes 0.3 (H)   Absolute Myelocytes 0.2 (H)   Absolute Nucleated RBC 0.2   Anisocytosis Slight   Polychromasia Slight   Platelet Estimate Confirming automa...         Assessment and Plan:  1. Pancreatic Cancer  S/p 2 cycles of FOLFIRINOX, last given 2/5 with Neulasta support given neutropenia with cycle 1. Given progressive motor neuropathy and cold sensitivity, it was recommend she decrease her oxaliplatin dose to 75mg/m2. Since she has been feeling so well over the weekend and wanting to not have any delays in her next treatment, she is willing to do Neulasta today so will add this to her plan. She does have a leukocytosis today but no infectious symptoms or signs on exam at all so will attribute this to Neulasta count recovery. Did instruct her to closely monitor for any infectious symptoms and call us if anything were to occur.    Overall plan in to complete 4 cycles of FOLFIRINOX before reimaging and discussing possible surgery.     2. Neulasta bone pain  Resolved. Given we will be doing Neulasta again this cycle, discussed scheduling Claritin and Tylenol, along with using Dilaudid PRN for pain control.    3. Motor neuropathy/cold sensitivity  Improved compared to last week but given its persistence past 7 days and interference with function, did decrease oxaliplatin dose to 75mg/m2. She does have very mild  tingling in her fingertips today but otherwise her symptoms are improved. Continue to avoid cold triggers and stretching to help with motor neuropathy.    4. GI  Will not dose adjust 5FU given mucositis was only for a few days and did not interfere with intake. Discussed continuing salt/soda swishes TID and coconut oil for mucositis since this was helpful. Also asked her to call us if the mouth sores are worse and we can prescribe magic mouth wash.    Continue Creon for pancreatic insufficiency. She has Metamucil, Miralax, and Imodium as needed for constipation or diarrhea. Continue Compazine PRN for nausea. Monitor for recurrent abdominal pain. She is getting an abdominal Xray today to ensure correct cystogastrostomy drain placement and passage of the pancreatic stent.    Abdominal xray shows the cystogastrostomy drain is correct but the pancreatic stent (which was temporary) needs to be removed as it is still present. Discussed with GI fellow and they will arrange for this to be done next week.      ADDENDUM: Received a call from Dr. Sanches late yesterday and discussed the pancreatic stent. He actually does NOT want to retrieve the stent since she is doing well and instead wants to leave everything as is and get her to surgery as soon as possible. Called GI fellow to let her know about this change and will call patient to let her know as well.      5. Follow-Up  3/6: Labs, Kellie, Cycle 4 FOLFIRINOX  3/13: Labs (CA 19-9) and CT CAP  3/19: Dr. Monk  3/20: Will schedule labs and next cycle of FOLFIRINOX just in case it is needed. This can be canceled if the plan is to go directly to surgery.      Kris Dudley PA-C  [facility]  [address] [place] [address] [phone]

## 2018-02-20 NOTE — PROGRESS NOTES
Oncology/Hematology Visit Note  Feb 20, 2018    Reason for Visit: Follow up of resectable pancreatic cancer    History of Present Illness: Staging CT chest/abd/pelvis on 12/9/17 showed several small pulmonary nodules (largest 3mm), unchanged mass in the pancreatic tail, mildly prominent mesenteric nodes thought likely reactive, and small right hepatic lobe hypodensities. Abdominal MRI on 12/10/17 showed hepatic hemangiomas, no evidence of metastatic disease to the liver.   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      Kitty met with Dr Monk and discussed neoadjuvant chemotherapy with FOLFIRINOX, then surgery and then adjuvant chemotherapy. She was seen on 1/15/18 for cycle 1 and then hospitalized for pancreatitis on 1/20/18 where she was managed with IV fluds and pain control. GI took her to the OR to attempt pancreatic duct stent placement but unfortunately it was completely obstructed. They were able to place a pancreatic stent and perform an EUS cyst-gastrostomy with plans to repeat Xray a month following to ensure stent passage. She was d/c 1/27. Cycle 2 was given 2/5/18 (a week delayed due to hospitalization).      She went to ED on 2/11 with worsening abdominal pain and was worried about pancreatitis. W/u with CT and labs including lipase were negative. She had elevated WBC of 33K but had Neulasta on 2/8. She was discharged home after improving with IV pain medications. She was seen last week for hospital follow-up where she noted progressive motor neuropathy/cold sensitivity and bone pain. As such it was recommended we dose reduce Oxaliplatin to 75 mg/m2 and hold Neulasta for cycle 3 which she returns for today.     Interval History:  Ms. Bangura was seen today with her . She states she feels great today, the best she has since October. She states her prior abdominal pain has resolved and she is not  needing any pain medications. Her bony pains also greatly improved with scheduling Tylenol and continuing Claritin. She states her bowels are moving normally and she is digesting well. Her jaw cramping has now resolved and she has faint numbness/tingling in her fingertips that is much improved compared to the first week after treatment. She still has cold sensitivity. Her mouth sores got worse last Thursday after she was seen in clinic but these improved with salt/soda swishes and coconut oil and did not interfere with her oral intake. She denies any nausea and states that nausea she did have with treatment was well controlled with compazine.    She continues to have mild rhinorrhea but no headaches, sinus tenderness, sore throat, cough, chest pain, or shortness of breath. No urinary concerns. No fevers, chills, or night sweats. No peripheral edema, headaches, dizziness, vision or hearing changes. She does have mild eczema on the dorsal aspect of both hands and has been trying to keep them well moisturized. She did recently start using a alcohol based hand  due to the water being to cold to wash her hands over night.     Current Outpatient Prescriptions   Medication Sig Dispense Refill     HYDROmorphone (DILAUDID) 2 MG tablet Take 1 tablet (2 mg) by mouth every 4 hours as needed for pain maximum 4 tablet(s) per day 25 tablet 0     Fluorouracil (ADURCIL) 5 GM/100ML SOLN        HYDROcodone-acetaminophen (NORCO) 5-325 MG per tablet   0     oxyCODONE IR (ROXICODONE) 5 MG tablet Take 1 tablet (5 mg) by mouth every 4 hours as needed for moderate to severe pain (Patient not taking: Reported on 1/31/2018) 30 tablet 0     ondansetron (ZOFRAN) 8 MG tablet Take 1 tablet (8 mg) by mouth every 8 hours as needed for nausea 30 tablet 0     LORazepam (ATIVAN) 0.5 MG tablet Take 1 tablet (0.5 mg) by mouth every 4 hours as needed (Anxiety, Nausea/Vomiting or Sleep) 30 tablet 2     prochlorperazine (COMPAZINE) 10 MG tablet  Take 1 tablet (10 mg) by mouth every 6 hours as needed (Nausea/Vomiting) 30 tablet 2     amylase-lipase-protease (CREON) 41310-73151 UNITS CPEP per EC capsule Take 2-3 with meals / 1-2 with snacks, up to 15 per day. 450 capsule 6       Physical Examination:  /72 (BP Location: Right arm, Patient Position: Sitting, Cuff Size: Adult Regular)  Pulse 66  Temp 97.7  F (36.5  C) (Oral)  Resp 16  Wt 73.2 kg (161 lb 6.4 oz)  SpO2 98%  BMI 26.86 kg/m2  Wt Readings from Last 10 Encounters:   02/20/18 73.2 kg (161 lb 6.4 oz)   02/14/18 72.3 kg (159 lb 6.4 oz)   02/14/18 72.3 kg (159 lb 6.4 oz)   02/11/18 72.6 kg (160 lb)   02/05/18 72.9 kg (160 lb 11.2 oz)   01/31/18 73.2 kg (161 lb 4.8 oz)   01/26/18 76.2 kg (168 lb)   01/15/18 76.6 kg (168 lb 14.4 oz)   01/12/18 76.2 kg (168 lb)   01/08/18 76.5 kg (168 lb 11.2 oz)     Constitutional: Well-appearing female in no acute distress.  Eyes: EOMI, PERRL. No scleral icterus.  ENT: Oral mucosa is moist without lesions or thrush.   Lymphatic: Neck is supple without cervical or supraclavicular lymphadenopathy.   Cardiovascular: Regular rate and rhythm. No murmurs, gallops, or rubs. No peripheral edema.  Respiratory: Clear to auscultation bilaterally. No wheezes or crackles.  Gastrointestinal: Bowel sounds present. Abdomen soft, non-tender. No palpable hepatosplenomegaly or masses.   Neurologic: Cranial nerves II through XII are grossly intact.  Skin: Mild dry skin and faint erythematous pustules on back of both hands. No involvement of palms. No other rashes, petechiae, or bruising noted on exposed skin.    Laboratory Data:  Results for EZEQUIEL CHAVIS (MRN [id number]) as of 2/20/2018 08:31   2/20/2018 06:43   Sodium 141   Potassium 4.0   Chloride 108   Carbon Dioxide 26   Urea Nitrogen 17   Creatinine 0.65   GFR Estimate >90   GFR Estimate If Black >90   Calcium 8.8   Anion Gap 7   Albumin 3.3 (L)   Protein Total 6.9   Bilirubin Total 0.2   Alkaline Phosphatase 176 (H)    ALT 42   AST 33   Glucose 124 (H)   WBC 17.5 (H)   Hemoglobin 12.4   Hematocrit 37.9   Platelet Count 127 (L)   RBC Count 4.12   MCV 92   MCH 30.1   MCHC 32.7   RDW 15.6 (H)   Diff Method Manual Differential   % Neutrophils 67.0   % Lymphocytes 22.6   % Monocytes 7.8   % Eosinophils 0.0   % Basophils 0.0   % Metamyelocytes 1.7   % Myelocytes 0.9   Nucleated RBCs 1 (H)   Absolute Neutrophil 11.7 (H)   Absolute Lymphocytes 4.0   Absolute Monocytes 1.4 (H)   Absolute Eosinophils 0.0   Absolute Basophils 0.0   Absolute Metamyelocytes 0.3 (H)   Absolute Myelocytes 0.2 (H)   Absolute Nucleated RBC 0.2   Anisocytosis Slight   Polychromasia Slight   Platelet Estimate Confirming automa...         Assessment and Plan:  1. Pancreatic Cancer  S/p 2 cycles of FOLFIRINOX, last given 2/5 with Neulasta support given neutropenia with cycle 1. Given progressive motor neuropathy and cold sensitivity, it was recommend she decrease her oxaliplatin dose to 75mg/m2. Since she has been feeling so well over the weekend and wanting to not have any delays in her next treatment, she is willing to do Neulasta today so will add this to her plan. She does have a leukocytosis today but no infectious symptoms or signs on exam at all so will attribute this to Neulasta count recovery. Did instruct her to closely monitor for any infectious symptoms and call us if anything were to occur.    Overall plan in to complete 4 cycles of FOLFIRINOX before reimaging and discussing possible surgery.     2. Neulasta bone pain  Resolved. Given we will be doing Neulasta again this cycle, discussed scheduling Claritin and Tylenol, along with using Dilaudid PRN for pain control.    3. Motor neuropathy/cold sensitivity  Improved compared to last week but given its persistence past 7 days and interference with function, did decrease oxaliplatin dose to 75mg/m2. She does have very mild tingling in her fingertips today but otherwise her symptoms are improved. Continue  to avoid cold triggers and stretching to help with motor neuropathy.    4. GI  Will not dose adjust 5FU given mucositis was only for a few days and did not interfere with intake. Discussed continuing salt/soda swishes TID and coconut oil for mucositis since this was helpful. Also asked her to call us if the mouth sores are worse and we can prescribe magic mouth wash.    Continue Creon for pancreatic insufficiency. She has Metamucil, Miralax, and Imodium as needed for constipation or diarrhea. Continue Compazine PRN for nausea. Monitor for recurrent abdominal pain. She is getting an abdominal Xray today to ensure correct cystogastrostomy drain placement and passage of the pancreatic stent.    Abdominal xray shows the cystogastrostomy drain is correct but the pancreatic stent (which was temporary) needs to be removed as it is still present. Discussed with GI fellow and they will arrange for this to be done next week.      ADDENDUM: Received a call from Dr. Sanches late yesterday and discussed the pancreatic stent. He actually does NOT want to retrieve the stent since she is doing well and instead wants to leave everything as is and get her to surgery as soon as possible. Called GI fellow to let her know about this change and will call patient to let her know as well.      5. Follow-Up  3/6: Labs, Kellie, Cycle 4 FOLFIRINOX  3/13: Labs (CA 19-9) and CT CAP  3/19: Dr. Monk  3/20: Will schedule labs and next cycle of FOLFIRINOX just in case it is needed. This can be canceled if the plan is to go directly to surgery.      Kris Dudley PA-C  [facility]  [address] [place] [address] [phone]

## 2018-02-20 NOTE — PROGRESS NOTES
Infusion Nursing Note:  Kitty Bangura presents today for Cycle 3 Day 1 Oxaliplatin, Irinotecan, Leucovorin, Fluorouracil bolus/pump.    Patient seen by provider today: Yes: Kris Dudley PA-C   present during visit today: Not Applicable.    Note: Feeling well.  Would like to talk to Kris about adding back in Neulasta with this cycle. (Done and had been added back in).    Intravenous Access:  Implanted Port.    Treatment Conditions:  Lab Results   Component Value Date    HGB 12.4 02/20/2018     Lab Results   Component Value Date    WBC 17.5 02/20/2018      Lab Results   Component Value Date    ANEU 11.7 02/20/2018     Lab Results   Component Value Date     02/20/2018      Lab Results   Component Value Date     02/20/2018                   Lab Results   Component Value Date    POTASSIUM 4.0 02/20/2018           Lab Results   Component Value Date    MAG 1.9 01/27/2018            Lab Results   Component Value Date    CR 0.65 02/20/2018                   Lab Results   Component Value Date    ILIANA 8.8 02/20/2018                Lab Results   Component Value Date    BILITOTAL 0.2 02/20/2018           Lab Results   Component Value Date    ALBUMIN 3.3 02/20/2018                    Lab Results   Component Value Date    ALT 42 02/20/2018           Lab Results   Component Value Date    AST 33 02/20/2018       Results reviewed, labs MET treatment parameters, ok to proceed with treatment.      Post Infusion Assessment:  Patient tolerated infusion without incident.  Blood return noted pre and post infusion.  Site patent and intact, free from redness, edema or discomfort.  No evidence of extravasations.  Fluorouracil C-Series pump infusing at 5.2ml/hr.   Connections taped, clamps taped open, capillary element taped down to skin with tegaderm.  Pump connections double checked by Bethany Martino RN.  Patient will see [facility] Thursday at 1030 for pump d/c and on-body Neulasta.    Discharge Plan:    Prescription refills given for Dexamethasone.  AVS to patient via [employer].  Patient will return 3/6/18 for labs, PA visit, chemo for next appointment.   Patient discharged in stable condition accompanied by: .  Departure Mode: Ambulatory.  Face to Face time: 0.    Sylvia Wood RN

## 2018-02-20 NOTE — MR AVS SNAPSHOT
After Visit Summary   2/20/2018    Kitty Bangura    MRN: [id number]           Patient Information     Date Of Birth          1960        Visit Information        Provider Department      2/20/2018 8:20 AM Kris Dudley PA [facility]        Today's Diagnoses     Pancreatic adenocarcinoma (H)    -  1       Follow-ups after your visit        Your next 10 appointments already scheduled     Feb 22, 2018 10:30 AM CST   Level O with ROOM 4 [facility] Cancer Infusion ([facility])    [facility]  [address]  [facility] [address]   [phone]            Mar 06, 2018  7:15 AM CST   Masonic Lab Draw with  MASONIC LAB DRAW   [facility] Lab Draw ([facility])    [address] [place] [address] [address]  [address]  [facility] [address]   [phone]            Mar 06, 2018  7:50 AM CST   (Arrive by 7:35 AM)   Return Visit with Kellie Nobles PA-C   [facility] ([facility])    [address] [place] [address] [address]  [address]  [facility] [address]   [phone]            Mar 06, 2018  9:00 AM CST   Infusion 360 with  ONCOLOGY INFUSION, UC 19 ATC   [facility] ([facility])    [address] [place] [address] [address]  [address]  [facility] [address]   [phone]            Mar 13, 2018  9:00 AM CDT   Level O with ROOM 5 [facility] Cancer Infusion ([facility])    [facility]  [address]  [facility] [address]   [phone]            Mar 13, 2018 10:00 AM CDT   (Arrive by 9:45 AM)   CT CHEST/ABDOMEN/PELVIS W CONTRAST with WYCT1   [facility] CT ([facility])    [address] [facility] [address]   [phone]           Please bring any scans or X-rays taken at other hospitals, if similar tests were done. Also bring a list of your medicines, including vitamins,  minerals and over-the-counter drugs. It is safest to leave personal items at home.  Be sure to tell your doctor:   If you have any allergies.   If there s any chance you are pregnant.   If you are breastfeeding.  You may have contrast for this exam. To prepare:   Do not eat or drink for 2 hours before your exam. If you need to take medicine, you may take it with small sips of water. (We may ask you to take liquid medicine as well.)   The day before your exam, drink extra fluids at least six 8-ounce glasses (unless your doctor tells you to restrict your fluids).   You will be given instructions on how to drink the contrast.  Patients over 70 or patients with diabetes or kidney problems:   If you haven t had a blood test (creatinine test) within the last 30 days, the Cardiologist/Radiologist may require you to get this test prior to your exam.  If you have diabetes:   Continue to take your metformin medication on the day of your exam  Please wear loose clothing, such as a sweat suit or jogging clothes. Avoid snaps, zippers and other metal. We may ask you to undress and put on a hospital gown.  If you have any questions, please call the Imaging Department where you will have your exam.            Mar 19, 2018  7:00 AM CDT   Masonic Lab Draw with  WILLIE LAB DRAW   [facility] Lab Draw ([facility])    [address] [place] [address]  [address] [place] [id number] [phone]            Mar 19, 2018  7:45 AM CDT   (Arrive by 7:30 AM)   Return Visit with Jovany Monk MD   [facility] Cancer [facility] ([facility])    [address] [place] [address]  [address]  [facility] [address] [phone]            Mar 19, 2018  8:30 AM CDT   Infusion 360 with  ONCOLOGY INFUSION   [facility])    [address] [place] [address]  [address] [place] [address] [phone]              Who to contact     If you have questions or  need follow up information about today's clinic visit or your schedule please contact [facility] directly at [phone].  Normal or non-critical lab and imaging results will be communicated to you by Futura Acorphart, letter or phone within 4 business days after the clinic has received the results. If you do not hear from us within 7 days, please contact the clinic through ChicPlacet or phone. If you have a critical or abnormal lab result, we will notify you by phone as soon as possible.  Submit refill requests through SEElogix or call your pharmacy and they will forward the refill request to us. Please allow 3 business days for your refill to be completed.          Additional Information About Your Visit        Futura AcorpharEquityNet Information     SEE[employer] gives you secure access to your electronic health record. If you see a primary care provider, you can also send messages to your care team and make appointments. If you have questions, please call your primary care clinic.  If you do not have a primary care provider, please call [phone] and they will assist you.        Care EveryWhere ID     This is your Care EveryWhere ID. This could be used by other organizations to access your [place] medical records  [id number]        Your Vitals Were     Pulse Temperature Respirations Pulse Oximetry BMI (Body Mass Index)       66 97.7  F (36.5  C) (Oral) 16 98% 26.86 kg/m2        Blood Pressure from Last 3 Encounters:   02/20/18 103/72   02/14/18 99/66   02/14/18 103/65    Weight from Last 3 Encounters:   02/20/18 73.2 kg (161 lb 6.4 oz)   02/14/18 72.3 kg (159 lb 6.4 oz)   02/14/18 72.3 kg (159 lb 6.4 oz)              Today, you had the following     No orders found for display         Today's Medication Changes          These changes are accurate as of 2/20/18  4:31 PM.  If you have any questions, ask your nurse or doctor.               Start taking these medicines.        Dose/Directions    dexamethasone 4 MG tablet    Commonly known as:  DECADRON   Used for:  Pancreatic adenocarcinoma (H)        Dose:  8 mg   Take 2 tablets (8 mg) by mouth daily (with breakfast)   Quantity:  12 tablet   Refills:  2            Where to get your medicines      These medications were sent to [place], [place] - [address] [facility] [address]  [address] [facility] [address], [facility] [address]    Hours:  TRANSPLANT PHONE NUMBER [phone] Phone:  [phone]     dexamethasone 4 MG tablet                Primary Care Provider Office Phone # Fax #    Williamsin Julito Mcgill [phone] [address] [facility] [address]        Equal Access to Services     CHASE DE GUZMAN : Tre Ruiz, waivy rasmussen, qaybta kaalmada marty, re yip. So [facility] [phone].    ATENCIÓN: Si habla español, tiene a quiros disposición servicios gratuitos de asistencia lingüística. Llame al [phone].    We comply with applicable federal civil rights laws and [place] laws. We do not discriminate on the basis of race, color, national origin, age, disability, sex, sexual orientation, or gender identity.            Thank you!     Thank you for choosing [facility] CANCER [facility]  for your care. Our goal is always to provide you with excellent care. Hearing back from our patients is one way we can continue to improve our services. Please take a few minutes to complete the written survey that you may receive in the mail after your visit with us. Thank you!             Your Updated Medication List - Protect others around you: Learn how to safely use, store and throw away your medicines at www.disposemymeds.org.          This list is accurate as of 2/20/18  4:31 PM.  Always use your most recent med list.                   Brand Name Dispense Instructions for use Diagnosis    amylase-lipase-protease [address]-30[address] UNITS Cpep per EC capsule    CREON    450 capsule     Take 2-3 with meals / 1-2 with snacks, up to 15 per day.    Necrotizing pancreatitis       dexamethasone 4 MG tablet    DECADRON    12 tablet    Take 2 tablets (8 mg) by mouth daily (with breakfast)    Pancreatic adenocarcinoma (H)       Fluorouracil 5 GM/100ML Soln    ADURCIL          HYDROcodone-acetaminophen 5-325 MG per tablet    NORCO          HYDROmorphone 2 MG tablet    DILAUDID    25 tablet    Take 1 tablet (2 mg) by mouth every 4 hours as needed for pain maximum 4 tablet(s) per day        LORazepam 0.5 MG tablet    ATIVAN    30 tablet    Take 1 tablet (0.5 mg) by mouth every 4 hours as needed (Anxiety, Nausea/Vomiting or Sleep)    Pancreatic adenocarcinoma (H)       ondansetron 8 MG tablet    ZOFRAN    30 tablet    Take 1 tablet (8 mg) by mouth every 8 hours as needed for nausea    Pancreatic adenocarcinoma (H)       oxyCODONE IR 5 MG tablet    ROXICODONE    30 tablet    Take 1 tablet (5 mg) by mouth every 4 hours as needed for moderate to severe pain    Necrotizing pancreatitis, Pancreatic adenocarcinoma (H)       prochlorperazine 10 MG tablet    COMPAZINE    30 tablet    Take 1 tablet (10 mg) by mouth every 6 hours as needed (Nausea/Vomiting)    Pancreatic adenocarcinoma (H)

## 2018-02-22 ENCOUNTER — INFUSION THERAPY VISIT (OUTPATIENT)
Dept: INFUSION THERAPY | Facility: CLINIC | Age: 58
End: 2018-02-22
Attending: PHYSICIAN ASSISTANT
Payer: COMMERCIAL

## 2018-02-22 VITALS — SYSTOLIC BLOOD PRESSURE: 110 MMHG | HEART RATE: 62 BPM | DIASTOLIC BLOOD PRESSURE: 69 MMHG | TEMPERATURE: 96.5 F

## 2018-02-22 DIAGNOSIS — C25.9 PANCREATIC ADENOCARCINOMA (H): Primary | ICD-10-CM

## 2018-02-22 PROCEDURE — 25000128 H RX IP 250 OP 636: Performed by: PHYSICIAN ASSISTANT

## 2018-02-22 PROCEDURE — 96377 APPLICATON ON-BODY INJECTOR: CPT

## 2018-02-22 RX ORDER — HEPARIN SODIUM (PORCINE) LOCK FLUSH IV SOLN 100 UNIT/ML 100 UNIT/ML
5 SOLUTION INTRAVENOUS
Status: DISCONTINUED | OUTPATIENT
Start: 2018-02-22 | End: 2018-02-22 | Stop reason: HOSPADM

## 2018-02-22 RX ADMIN — PEGFILGRASTIM 6 MG: KIT SUBCUTANEOUS at 10:57

## 2018-02-22 RX ADMIN — SODIUM CHLORIDE, PRESERVATIVE FREE 5 ML: 5 INJECTION INTRAVENOUS at 10:52

## 2018-02-22 NOTE — PROGRESS NOTES
Infusion Nursing Note:  Kitty Bangura presents today for C3D3 FOLFIRINOX .    Patient seen by provider today: No   present during visit today: Not Applicable.    Note: Recommended pt take B6 100 mg daily up to three times a day for numbness/tingling to legs.  Can take up to 200 mg three times a day.  Pt aware of this information and will update MD should her symptoms continue to worsen.      Intravenous Access:  Implanted Port.    Treatment Conditions:  Not Applicable.      Post Infusion Assessment:  Patient tolerated 5 FU infusion without incident.  Blood return noted post infusion.  Site patent and intact, free from redness, edema or discomfort.  No evidence of extravasations.  Access discontinued per protocol.    On pro Neulasta injector applied to right arm.  Pt given instructions and verbal teaching about the on body injector. Questions answered and pt verbalizes understanding of teaching.  Pt aware to remove tomorrow at 4:00 pm.        Discharge Plan:   Patient discharged in stable condition accompanied by: self.  Departure Mode: Ambulatory.    Lissa Lord RN

## 2018-02-22 NOTE — MR AVS SNAPSHOT
After Visit Summary   2/22/2018    Kitty Bangura    MRN: [id number]           Patient Information     Date Of Birth          1960        Visit Information        Provider Department      2/22/2018 10:30 AM ROOM 4 [facility] Cancer Infusion        Today's Diagnoses     Pancreatic adenocarcinoma (H)    -  1       Follow-ups after your visit        Your next 10 appointments already scheduled     Mar 06, 2018  7:15 AM CST   Masonic Lab Draw with UC MASONIC LAB DRAW   [facility] Lab Draw ([facility])    [address] [place] [address] [address]  [address]  [facility] [address]   [phone]            Mar 06, 2018  7:50 AM CST   (Arrive by 7:35 AM)   Return Visit with Kellie Nobles PA-C   [facility] ([facility])    [address] [place] [address]  [address]  [facility] [address]   [phone]            Mar 06, 2018  9:00 AM CST   Infusion 360 with  ONCOLOGY INFUSION, UC 19 ATC   [facility] ([facility])    [address] [place] [address]  [address]  [facility] [address]   [phone]            Mar 13, 2018  9:00 AM CDT   Level O with ROOM 5 [facility] Cancer Infusion ([facility])    n Medical Ctr [facility]  [address]  [facility] [address]   [phone]            Mar 13, 2018 10:00 AM CDT   (Arrive by 9:45 AM)   CT CHEST/ABDOMEN/PELVIS W CONTRAST with WYCT1   [facility] CT ([facility])    [address] [place]  [facility] [address]   [phone]           Please bring any scans or X-rays taken at other hospitals, if similar tests were done. Also bring a list of your medicines, including vitamins, minerals and over-the-counter drugs. It is safest to leave personal items at home.  Be sure to tell your doctor:   If you have any allergies.   If there s any chance you are pregnant.   If you are breastfeeding.  You may have contrast for  this exam. To prepare:   Do not eat or drink for 2 hours before your exam. If you need to take medicine, you may take it with small sips of water. (We may ask you to take liquid medicine as well.)   The day before your exam, drink extra fluids at least six 8-ounce glasses (unless your doctor tells you to restrict your fluids).   You will be given instructions on how to drink the contrast.  Patients over 70 or patients with diabetes or kidney problems:   If you haven t had a blood test (creatinine test) within the last 30 days, the Cardiologist/Radiologist may require you to get this test prior to your exam.  If you have diabetes:   Continue to take your metformin medication on the day of your exam  Please wear loose clothing, such as a sweat suit or jogging clothes. Avoid snaps, zippers and other metal. We may ask you to undress and put on a hospital gown.  If you have any questions, please call the Imaging Department where you will have your exam.            Mar 19, 2018  7:00 AM CDT   Masonic Lab Draw with  WILLIE LAB DRAW   [facility] Lab Draw ([facility])    [address] [place] [address]  [address]  [facility] [address] [phone]            Mar 19, 2018  7:45 AM CDT   (Arrive by 7:30 AM)   Return Visit with Jovany Monk MD   [facility] ([facility])    [address] [place] [address]  [address]  [facility] [address] [phone]            Mar 19, 2018  8:30 AM CDT   Infusion 360 with MEHRDAD ONCOLOGY INFUSION,  18 ATC   [facility] ([facility])    [address] [place] [address]  [address]  [facility] [address] [phone]              Who to contact     If you have questions or need follow up information about today's clinic visit or your schedule please contact [facility] CANCER INFUSION directly at [phone].  Normal or non-critical lab and imaging results will be communicated to you by Gail, letter  or phone within 4 business days after the clinic has received the results. If you do not hear from us within 7 days, please contact the clinic through [employer] or phone. If you have a critical or abnormal lab result, we will notify you by phone as soon as possible.  Submit refill requests through [employer] or call your pharmacy and they will forward the refill request to us. Please allow 3 business days for your refill to be completed.          Additional Information About Your Visit        HyperinkharTIBCO Software Information     [employer] gives you secure access to your electronic health record. If you see a primary care provider, you can also send messages to your care team and make appointments. If you have questions, please call your primary care clinic.  If you do not have a primary care provider, please call [phone] and they will assist you.        Care EveryWhere ID     This is your Care EveryWhere ID. This could be used by other organizations to access your [place] medical records  [id number]        Your Vitals Were     Pulse Temperature                62 96.5  F (35.8  C) (Oral)           Blood Pressure from Last 3 Encounters:   02/22/18 110/69   02/20/18 103/72   02/14/18 99/66    Weight from Last 3 Encounters:   02/20/18 73.2 kg (161 lb 6.4 oz)   02/14/18 72.3 kg (159 lb 6.4 oz)   02/14/18 72.3 kg (159 lb 6.4 oz)              Today, you had the following     No orders found for display       Primary Care Provider Office Phone # Fax #    Solange Julito Mcgill [phone] [address] [facility] [address]        Equal Access to Services     [facility] HospitalJANICE : Hadii aad ku hadasho Soomaali, waaxda luqadaha, qaybta kaalmada marty, re yip. So [facility] [phone].    ATENCIÓN: Si habla español, tiene a quiros disposición servicios gratuitos de asistencia lingüística. Llame al [phone].    We comply with applicable federal civil rights laws and [place] laws. We  do not discriminate on the basis of race, color, national origin, age, disability, sex, sexual orientation, or gender identity.            Thank you!     Thank you for choosing [facility]  for your care. Our goal is always to provide you with excellent care. Hearing back from our patients is one way we can continue to improve our services. Please take a few minutes to complete the written survey that you may receive in the mail after your visit with us. Thank you!             Your Updated Medication List - Protect others around you: Learn how to safely use, store and throw away your medicines at www.disposemymeds.org.          This list is accurate as of 2/22/18 12:01 PM.  Always use your most recent med list.                   Brand Name Dispense Instructions for use Diagnosis    amylase-lipase-protease [address] UNITS Cpep per EC capsule    CREON    450 capsule    Take 2-3 with meals / 1-2 with snacks, up to 15 per day.    Necrotizing pancreatitis       dexamethasone 4 MG tablet    DECADRON    12 tablet    Take 2 tablets (8 mg) by mouth daily (with breakfast)    Pancreatic adenocarcinoma (H)       Fluorouracil 5 GM/100ML Soln    ADURCIL          HYDROcodone-acetaminophen 5-325 MG per tablet    NORCO          HYDROmorphone 2 MG tablet    DILAUDID    25 tablet    Take 1 tablet (2 mg) by mouth every 4 hours as needed for pain maximum 4 tablet(s) per day        LORazepam 0.5 MG tablet    ATIVAN    30 tablet    Take 1 tablet (0.5 mg) by mouth every 4 hours as needed (Anxiety, Nausea/Vomiting or Sleep)    Pancreatic adenocarcinoma (H)       ondansetron 8 MG tablet    ZOFRAN    30 tablet    Take 1 tablet (8 mg) by mouth every 8 hours as needed for nausea    Pancreatic adenocarcinoma (H)       oxyCODONE IR 5 MG tablet    ROXICODONE    30 tablet    Take 1 tablet (5 mg) by mouth every 4 hours as needed for moderate to severe pain    Necrotizing pancreatitis, Pancreatic adenocarcinoma (H)       prochlorperazine  10 MG tablet    COMPAZINE    30 tablet    Take 1 tablet (10 mg) by mouth every 6 hours as needed (Nausea/Vomiting)    Pancreatic adenocarcinoma (H)

## 2018-02-26 ENCOUNTER — CARE COORDINATION (OUTPATIENT)
Dept: SURGERY | Facility: CLINIC | Age: 58
End: 2018-02-26

## 2018-02-26 DIAGNOSIS — C25.9 PANCREAS CANCER (H): Primary | ICD-10-CM

## 2018-03-01 ENCOUNTER — INFUSION THERAPY VISIT (OUTPATIENT)
Dept: ONCOLOGY | Facility: CLINIC | Age: 58
End: 2018-03-01
Attending: INTERNAL MEDICINE
Payer: COMMERCIAL

## 2018-03-01 ENCOUNTER — RADIANT APPOINTMENT (OUTPATIENT)
Dept: GENERAL RADIOLOGY | Facility: CLINIC | Age: 58
End: 2018-03-01
Attending: PHYSICIAN ASSISTANT
Payer: COMMERCIAL

## 2018-03-01 ENCOUNTER — ONCOLOGY VISIT (OUTPATIENT)
Dept: ONCOLOGY | Facility: CLINIC | Age: 58
End: 2018-03-01
Attending: PHYSICIAN ASSISTANT
Payer: COMMERCIAL

## 2018-03-01 ENCOUNTER — TELEPHONE (OUTPATIENT)
Dept: ONCOLOGY | Facility: CLINIC | Age: 58
End: 2018-03-01

## 2018-03-01 ENCOUNTER — APPOINTMENT (OUTPATIENT)
Dept: LAB | Facility: CLINIC | Age: 58
End: 2018-03-01
Attending: INTERNAL MEDICINE
Payer: COMMERCIAL

## 2018-03-01 VITALS
BODY MASS INDEX: 26.06 KG/M2 | WEIGHT: 156.4 LBS | TEMPERATURE: 99.3 F | OXYGEN SATURATION: 98 % | SYSTOLIC BLOOD PRESSURE: 93 MMHG | HEART RATE: 87 BPM | RESPIRATION RATE: 16 BRPM | DIASTOLIC BLOOD PRESSURE: 63 MMHG | HEIGHT: 65 IN

## 2018-03-01 DIAGNOSIS — C25.9 PANCREATIC ADENOCARCINOMA (H): ICD-10-CM

## 2018-03-01 DIAGNOSIS — E86.0 DEHYDRATION: ICD-10-CM

## 2018-03-01 DIAGNOSIS — R05.9 COUGH: ICD-10-CM

## 2018-03-01 DIAGNOSIS — C25.9 PANCREATIC ADENOCARCINOMA (H): Primary | ICD-10-CM

## 2018-03-01 DIAGNOSIS — E86.0 DEHYDRATION: Primary | ICD-10-CM

## 2018-03-01 DIAGNOSIS — J20.9 ACUTE BRONCHITIS, UNSPECIFIED ORGANISM: Primary | ICD-10-CM

## 2018-03-01 LAB
ALBUMIN SERPL-MCNC: 3.5 G/DL (ref 3.4–5)
ALP SERPL-CCNC: 216 U/L (ref 40–150)
ALT SERPL W P-5'-P-CCNC: 41 U/L (ref 0–50)
ANION GAP SERPL CALCULATED.3IONS-SCNC: 8 MMOL/L (ref 3–14)
ANISOCYTOSIS BLD QL SMEAR: SLIGHT
AST SERPL W P-5'-P-CCNC: 23 U/L (ref 0–45)
BASOPHILS # BLD AUTO: 0.1 10E9/L (ref 0–0.2)
BASOPHILS NFR BLD AUTO: 1.8 %
BILIRUB SERPL-MCNC: 0.4 MG/DL (ref 0.2–1.3)
BUN SERPL-MCNC: 11 MG/DL (ref 7–30)
CALCIUM SERPL-MCNC: 9 MG/DL (ref 8.5–10.1)
CHLORIDE SERPL-SCNC: 102 MMOL/L (ref 94–109)
CO2 SERPL-SCNC: 26 MMOL/L (ref 20–32)
CREAT SERPL-MCNC: 0.65 MG/DL (ref 0.52–1.04)
DIFFERENTIAL METHOD BLD: ABNORMAL
EOSINOPHIL # BLD AUTO: 0 10E9/L (ref 0–0.7)
EOSINOPHIL NFR BLD AUTO: 0 %
ERYTHROCYTE [DISTWIDTH] IN BLOOD BY AUTOMATED COUNT: 15.8 % (ref 10–15)
FLUAV+FLUBV RNA SPEC QL NAA+PROBE: NEGATIVE
FLUAV+FLUBV RNA SPEC QL NAA+PROBE: NEGATIVE
GFR SERPL CREATININE-BSD FRML MDRD: >90 ML/MIN/1.7M2
GLUCOSE SERPL-MCNC: 82 MG/DL (ref 70–99)
HCT VFR BLD AUTO: 39.2 % (ref 35–47)
HGB BLD-MCNC: 12.7 G/DL (ref 11.7–15.7)
LYMPHOCYTES # BLD AUTO: 2.3 10E9/L (ref 0.8–5.3)
LYMPHOCYTES NFR BLD AUTO: 33.3 %
MCH RBC QN AUTO: 29.6 PG (ref 26.5–33)
MCHC RBC AUTO-ENTMCNC: 32.4 G/DL (ref 31.5–36.5)
MCV RBC AUTO: 91 FL (ref 78–100)
METAMYELOCYTES # BLD: 0.1 10E9/L
METAMYELOCYTES NFR BLD MANUAL: 0.9 %
MONOCYTES # BLD AUTO: 1.5 10E9/L (ref 0–1.3)
MONOCYTES NFR BLD AUTO: 21.1 %
MYELOCYTES # BLD: 0.1 10E9/L
MYELOCYTES NFR BLD MANUAL: 0.9 %
NEUTROPHILS # BLD AUTO: 2.8 10E9/L (ref 1.6–8.3)
NEUTROPHILS NFR BLD AUTO: 40.2 %
NRBC # BLD AUTO: 0.1 10*3/UL
NRBC BLD AUTO-RTO: 2 /100
PLATELET # BLD AUTO: 116 10E9/L (ref 150–450)
PLATELET # BLD EST: ABNORMAL 10*3/UL
POLYCHROMASIA BLD QL SMEAR: SLIGHT
POTASSIUM SERPL-SCNC: 3.8 MMOL/L (ref 3.4–5.3)
PROMYELOCYTES # BLD MANUAL: 0.1 10E9/L
PROMYELOCYTES NFR BLD MANUAL: 1.8 %
PROT SERPL-MCNC: 7.3 G/DL (ref 6.8–8.8)
RBC # BLD AUTO: 4.29 10E12/L (ref 3.8–5.2)
RSV RNA SPEC NAA+PROBE: NEGATIVE
SODIUM SERPL-SCNC: 135 MMOL/L (ref 133–144)
SPECIMEN SOURCE: NORMAL
WBC # BLD AUTO: 7 10E9/L (ref 4–11)

## 2018-03-01 PROCEDURE — 25000128 H RX IP 250 OP 636: Mod: ZF | Performed by: INTERNAL MEDICINE

## 2018-03-01 PROCEDURE — 36415 COLL VENOUS BLD VENIPUNCTURE: CPT

## 2018-03-01 PROCEDURE — 87040 BLOOD CULTURE FOR BACTERIA: CPT | Performed by: PHYSICIAN ASSISTANT

## 2018-03-01 PROCEDURE — 87631 RESP VIRUS 3-5 TARGETS: CPT | Performed by: PHYSICIAN ASSISTANT

## 2018-03-01 PROCEDURE — 80053 COMPREHEN METABOLIC PANEL: CPT | Performed by: PHYSICIAN ASSISTANT

## 2018-03-01 PROCEDURE — G0463 HOSPITAL OUTPT CLINIC VISIT: HCPCS | Mod: ZF,25

## 2018-03-01 PROCEDURE — 96360 HYDRATION IV INFUSION INIT: CPT

## 2018-03-01 PROCEDURE — 25000128 H RX IP 250 OP 636: Mod: ZF | Performed by: PHYSICIAN ASSISTANT

## 2018-03-01 PROCEDURE — 85025 COMPLETE CBC W/AUTO DIFF WBC: CPT | Performed by: PHYSICIAN ASSISTANT

## 2018-03-01 PROCEDURE — 99214 OFFICE O/P EST MOD 30 MIN: CPT | Mod: ZP | Performed by: PHYSICIAN ASSISTANT

## 2018-03-01 RX ORDER — HEPARIN SODIUM (PORCINE) LOCK FLUSH IV SOLN 100 UNIT/ML 100 UNIT/ML
500 SOLUTION INTRAVENOUS ONCE
Status: COMPLETED | OUTPATIENT
Start: 2018-03-01 | End: 2018-03-01

## 2018-03-01 RX ORDER — HEPARIN SODIUM (PORCINE) LOCK FLUSH IV SOLN 100 UNIT/ML 100 UNIT/ML
5 SOLUTION INTRAVENOUS ONCE
Status: COMPLETED | OUTPATIENT
Start: 2018-03-01 | End: 2018-03-01

## 2018-03-01 RX ORDER — AZITHROMYCIN 250 MG/1
TABLET, FILM COATED ORAL
Qty: 6 TABLET | Refills: 0 | Status: SHIPPED | OUTPATIENT
Start: 2018-03-01 | End: 2018-03-06

## 2018-03-01 RX ORDER — ALBUTEROL SULFATE 90 UG/1
2 AEROSOL, METERED RESPIRATORY (INHALATION) EVERY 6 HOURS PRN
Qty: 1 INHALER | Refills: 0 | Status: SHIPPED | OUTPATIENT
Start: 2018-03-01 | End: 2018-05-14

## 2018-03-01 RX ADMIN — SODIUM CHLORIDE, PRESERVATIVE FREE 500 UNITS: 5 INJECTION INTRAVENOUS at 18:24

## 2018-03-01 RX ADMIN — SODIUM CHLORIDE 1000 ML: 9 INJECTION, SOLUTION INTRAVENOUS at 17:15

## 2018-03-01 RX ADMIN — SODIUM CHLORIDE, PRESERVATIVE FREE 5 ML: 5 INJECTION INTRAVENOUS at 16:02

## 2018-03-01 ASSESSMENT — ENCOUNTER SYMPTOMS
EYE PAIN: 0
INSOMNIA: 1
SYNCOPE: 0
WEIGHT LOSS: 1
ALTERED TEMPERATURE REGULATION: 0
ARTHRALGIAS: 1
NECK MASS: 0
SORE THROAT: 0
NIGHT SWEATS: 0
NUMBNESS: 0
SNORES LOUDLY: 0
CHILLS: 0
PARALYSIS: 0
NECK PAIN: 0
VOMITING: 0
POLYDIPSIA: 0
FATIGUE: 1
EYE WATERING: 0
DYSPNEA ON EXERTION: 0
NAUSEA: 1
MEMORY LOSS: 0
BLOOD IN STOOL: 0
LEG PAIN: 0
CONSTIPATION: 1
HOARSE VOICE: 1
SLEEP DISTURBANCES DUE TO BREATHING: 0
JAUNDICE: 0
DIFFICULTY URINATING: 0
SMELL DISTURBANCE: 0
FEVER: 0
SHORTNESS OF BREATH: 0
FLANK PAIN: 0
NAIL CHANGES: 0
LOSS OF CONSCIOUSNESS: 0
NERVOUS/ANXIOUS: 1
TASTE DISTURBANCE: 1
SINUS CONGESTION: 0
COUGH: 1
HEMOPTYSIS: 0
HEMATURIA: 0
DEPRESSION: 0
TREMORS: 0
WEIGHT GAIN: 0
SINUS PAIN: 0
TROUBLE SWALLOWING: 0
DECREASED CONCENTRATION: 1
HYPOTENSION: 1
PANIC: 0
HALLUCINATIONS: 0
TINGLING: 1
EXERCISE INTOLERANCE: 0
HYPERTENSION: 0
ORTHOPNEA: 0
DISTURBANCES IN COORDINATION: 0
DOUBLE VISION: 0
BLOATING: 1
SKIN CHANGES: 0
ABDOMINAL PAIN: 1
INCREASED ENERGY: 1
SEIZURES: 0
POOR WOUND HEALING: 0
LIGHT-HEADEDNESS: 0
PALPITATIONS: 0
BOWEL INCONTINENCE: 0
HEARTBURN: 1
SPEECH CHANGE: 0
EYE IRRITATION: 0
JOINT SWELLING: 0
EYE REDNESS: 0
DIZZINESS: 1
POLYPHAGIA: 0
DECREASED APPETITE: 0
DIARRHEA: 1
HEADACHES: 0
DYSURIA: 0

## 2018-03-01 ASSESSMENT — PAIN SCALES - GENERAL: PAINLEVEL: NO PAIN (0)

## 2018-03-01 NOTE — PROGRESS NOTES
Oncology/Hematology Visit Note  Mar 1, 2018    Reason for Visit: add-on fever    History of Present Illness: Staging CT chest/abd/pelvis on 12/9/17 showed several small pulmonary nodules (largest 3mm), unchanged mass in the pancreatic tail, mildly prominent mesenteric nodes thought likely reactive, and small right hepatic lobe hypodensities. Abdominal MRI on 12/10/17 showed hepatic hemangiomas, no evidence of metastatic disease to the liver.   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     Kitty met with Dr Monk and discussed neoadjuvant chemotherapy with FOLFIRINOX, then surgery and then adjuvant chemotherapy. She was seen on 1/15/18 for cycle 1 and then hospitalized for pancreatitis on 1/20/18 where she was managed with IV fluds and pain control. GI took her to the OR to attempt pancreatic duct stent placement but unfortunately it was completely obstructed. They were able to place a pancreatic stent and perform an EUS cyst-gastrostomy with plans to repeat Xray a month following to ensure stent passage. She was d/c 1/27. Cycle 2 was given 2/5/18 (a week delayed due to hospitalization).     She went to ED on 2/11 with worsening abdominal pain and was worried about pancreatitis. W/u with CT and labs including lipase were negative. She had elevated WBC of 33K but had Neulasta on 2/8.     Cycle 3 was given 2/20 with dose-reduction of oxaliplatin by 10%.     Interval History:  Mrs. Bagnura returns to clinic today with her . He was recently diagnosed with pneumonia and has been on antibiotics since Sunday. Flu test was negative. Kitty started to have a cough on Tuesday and develop fever Tmax of 100.4 yesterday. She has been taking tylenol 1000 mg TID scheduled for her bone pain on Neulasta (which has been much better). She feels fatigued and has chills and increasing temp when tylenol wears off. Will have headaches  as well and has some sinus pressure frontally. Has some wheezing when she is laying flat. No SOB but chest does feel tight. Scratchy throat. She has had diarrhea intermittently but this seems consistent with past chemo effects. Took 1 Imodium today and was constipated today and having diffuse gas pain. She has now had a BM and gas pain resolved. No issues with urinating. She has been low on fluid intake, drinking 4-5 cups per day. BP is stable but she has some dizziness with position changes. No other concerns.     Current Outpatient Prescriptions   Medication Sig Dispense Refill     azithromycin (ZITHROMAX) 250 MG tablet Take 2 tablets by mouth today and 1 tablet starting tomorrow for 4 days. 6 tablet 0     albuterol (PROAIR HFA/PROVENTIL HFA/VENTOLIN HFA) 108 (90 BASE) MCG/ACT Inhaler Inhale 2 puffs into the lungs every 6 hours as needed for shortness of breath / dyspnea or wheezing 1 Inhaler 0     dexamethasone (DECADRON) 4 MG tablet Take 2 tablets (8 mg) by mouth daily (with breakfast) 12 tablet 2     Fluorouracil (ADURCIL) 5 GM/100ML SOLN        ondansetron (ZOFRAN) 8 MG tablet Take 1 tablet (8 mg) by mouth every 8 hours as needed for nausea 30 tablet 0     LORazepam (ATIVAN) 0.5 MG tablet Take 1 tablet (0.5 mg) by mouth every 4 hours as needed (Anxiety, Nausea/Vomiting or Sleep) 30 tablet 2     prochlorperazine (COMPAZINE) 10 MG tablet Take 1 tablet (10 mg) by mouth every 6 hours as needed (Nausea/Vomiting) 30 tablet 2     amylase-lipase-protease (CREON) 23305-99898 UNITS CPEP per EC capsule Take 2-3 with meals / 1-2 with snacks, up to 15 per day. 450 capsule 6     HYDROmorphone (DILAUDID) 2 MG tablet Take 1 tablet (2 mg) by mouth every 4 hours as needed for pain maximum 4 tablet(s) per day (Patient not taking: Reported on 3/1/2018) 25 tablet 0     HYDROcodone-acetaminophen (NORCO) 5-325 MG per tablet   0     oxyCODONE IR (ROXICODONE) 5 MG tablet Take 1 tablet (5 mg) by mouth every 4 hours as needed for  "moderate to severe pain (Patient not taking: Reported on 1/31/2018) 30 tablet 0       Physical Examination:  BP 93/63 (BP Location: Right arm, Patient Position: Chair, Cuff Size: Adult Regular)  Pulse 87  Temp 99.3  F (37.4  C) (Oral)  Resp 16  Ht 1.651 m (5' 5\")  Wt 70.9 kg (156 lb 6.4 oz)  SpO2 98%  BMI 26.03 kg/m2  Wt Readings from Last 10 Encounters:   03/01/18 70.9 kg (156 lb 6.4 oz)   02/20/18 73.2 kg (161 lb 6.4 oz)   02/14/18 72.3 kg (159 lb 6.4 oz)   02/14/18 72.3 kg (159 lb 6.4 oz)   02/11/18 72.6 kg (160 lb)   02/05/18 72.9 kg (160 lb 11.2 oz)   01/31/18 73.2 kg (161 lb 4.8 oz)   01/26/18 76.2 kg (168 lb)   01/15/18 76.6 kg (168 lb 14.4 oz)   01/12/18 76.2 kg (168 lb)     Constitutional: Well-appearing female in no acute distress. Tired appearing   Eyes: EOMI, PERRL. No scleral icterus.  ENT: Oral mucosa is moist without lesions or thrush. Some frontal sinus tenderness   Lymphatic: Neck is supple without cervical or supraclavicular lymphadenopathy.   Cardiovascular: Regular rate and rhythm. No murmurs, gallops, or rubs. No peripheral edema.  Respiratory: Clear to auscultation bilaterally. Coughing there is some bronchospasm. No wheezes or crackles.  Gastrointestinal: Bowel sounds present. Abdomen soft, nontender. No palpable hepatosplenomegaly or masses.   Neurologic: Cranial nerves II through XII are grossly intact.  Skin: No rashes, petechiae, or bruising noted on exposed skin.    Laboratory Data:   3/1/2018 16:19   Sodium 135   Potassium 3.8   Chloride 102   Carbon Dioxide 26   Urea Nitrogen 11   Creatinine 0.65   GFR Estimate >90   GFR Estimate If Black >90   Calcium 9.0   Anion Gap 8   Albumin 3.5   Protein Total 7.3   Bilirubin Total 0.4   Alkaline Phosphatase 216 (H)   ALT 41   AST 23   Glucose 82   WBC 7.0   Hemoglobin 12.7   Hematocrit 39.2   Platelet Count 116 (L)   RBC Count 4.29   MCV 91   MCH 29.6   MCHC 32.4   RDW 15.8 (H)   Diff Method Manual Differential   % Neutrophils 40.2   % " Lymphocytes 33.3   % Monocytes 21.1   % Eosinophils 0.0   % Basophils 1.8   % Metamyelocytes 0.9   % Myelocytes 0.9   % Promyelocytes 1.8   Nucleated RBCs 2 (H)   Absolute Neutrophil 2.8   Absolute Lymphocytes 2.3   Absolute Monocytes 1.5 (H)   Absolute Eosinophils 0.0   Absolute Basophils 0.1   Absolute Metamyelocytes 0.1 (H)   Absolute Myelocytes 0.1 (H)   Absolute Promyelocytes 0.1 (H)   Absolute Nucleated RBC 0.1   Anisocytosis Slight   Polychromasia Slight   Platelet Estimate Confirming automa...   BLOOD CULTURE Rpt     Influenza A/B and RSV pending  BC x 2 pending    CXR benign     Assessment and Plan:  1. Fever, cough: She is not neutropenic, ANC is 2.8. CXR is clear. Lung exam is consistent with bronchitis. Given her immunosuppression, will treat with azithromycin and albuterol. Influenza screening was negative. Continue to monitor fevers and call us if any worsening symptoms or higher temps after 24 hours.      2. Diarrhea: Likely chemo related given history. Try 1/2 tab of Imodium at a time to help avoid excessive gas pain and constipation.     3. Dehydration: BP is stable but she runs low. Given poor intake and fatigue, will give 1 L IVF today. Goal of 6-8 c of fluids by mouth daily.     Kellie Nobles PA-C  [facility]  [address], [place] [id number] [phone]

## 2018-03-01 NOTE — LETTER
3/1/2018      RE: Kitty Bangura  [address] [address] [address]       Oncology/Hematology Visit Note  Mar 1, 2018    Reason for Visit: add-on fever    History of Present Illness: Staging CT chest/abd/pelvis on 12/9/17 showed several small pulmonary nodules (largest 3mm), unchanged mass in the pancreatic tail, mildly prominent mesenteric nodes thought likely reactive, and small right hepatic lobe hypodensities. Abdominal MRI on 12/10/17 showed hepatic hemangiomas, no evidence of metastatic disease to the liver.   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     Kitty met with Dr Monk and discussed neoadjuvant chemotherapy with FOLFIRINOX, then surgery and then adjuvant chemotherapy. She was seen on 1/15/18 for cycle 1 and then hospitalized for pancreatitis on 1/20/18 where she was managed with IV fluds and pain control. GI took her to the OR to attempt pancreatic duct stent placement but unfortunately it was completely obstructed. They were able to place a pancreatic stent and perform an EUS cyst-gastrostomy with plans to repeat Xray a month following to ensure stent passage. She was d/c 1/27. Cycle 2 was given 2/5/18 (a week delayed due to hospitalization).     She went to ED on 2/11 with worsening abdominal pain and was worried about pancreatitis. W/u with CT and labs including lipase were negative. She had elevated WBC of 33K but had Neulasta on 2/8.     Cycle 3 was given 2/20 with dose-reduction of oxaliplatin by 10%.     Interval History:  Mrs. Bangura returns to clinic today with her . He was recently diagnosed with pneumonia and has been on antibiotics since Sunday. Flu test was negative. Kitty started to have a cough on Tuesday and develop fever Tmax of 100.4 yesterday. She has been taking tylenol 1000 mg TID scheduled for her bone pain on Neulasta (which has been much better). She feels  fatigued and has chills and increasing temp when tylenol wears off. Will have headaches as well and has some sinus pressure frontally. Has some wheezing when she is laying flat. No SOB but chest does feel tight. Scratchy throat. She has had diarrhea intermittently but this seems consistent with past chemo effects. Took 1 Imodium today and was constipated today and having diffuse gas pain. She has now had a BM and gas pain resolved. No issues with urinating. She has been low on fluid intake, drinking 4-5 cups per day. BP is stable but she has some dizziness with position changes. No other concerns.     Current Outpatient Prescriptions   Medication Sig Dispense Refill     azithromycin (ZITHROMAX) 250 MG tablet Take 2 tablets by mouth today and 1 tablet starting tomorrow for 4 days. 6 tablet 0     albuterol (PROAIR HFA/PROVENTIL HFA/VENTOLIN HFA) 108 (90 BASE) MCG/ACT Inhaler Inhale 2 puffs into the lungs every 6 hours as needed for shortness of breath / dyspnea or wheezing 1 Inhaler 0     dexamethasone (DECADRON) 4 MG tablet Take 2 tablets (8 mg) by mouth daily (with breakfast) 12 tablet 2     Fluorouracil (ADURCIL) 5 GM/100ML SOLN        ondansetron (ZOFRAN) 8 MG tablet Take 1 tablet (8 mg) by mouth every 8 hours as needed for nausea 30 tablet 0     LORazepam (ATIVAN) 0.5 MG tablet Take 1 tablet (0.5 mg) by mouth every 4 hours as needed (Anxiety, Nausea/Vomiting or Sleep) 30 tablet 2     prochlorperazine (COMPAZINE) 10 MG tablet Take 1 tablet (10 mg) by mouth every 6 hours as needed (Nausea/Vomiting) 30 tablet 2     amylase-lipase-protease (CREON) 89245-12522 UNITS CPEP per EC capsule Take 2-3 with meals / 1-2 with snacks, up to 15 per day. 450 capsule 6     HYDROmorphone (DILAUDID) 2 MG tablet Take 1 tablet (2 mg) by mouth every 4 hours as needed for pain maximum 4 tablet(s) per day (Patient not taking: Reported on 3/1/2018) 25 tablet 0     HYDROcodone-acetaminophen (NORCO) 5-325 MG per tablet   0     oxyCODONE IR  "(ROXICODONE) 5 MG tablet Take 1 tablet (5 mg) by mouth every 4 hours as needed for moderate to severe pain (Patient not taking: Reported on 1/31/2018) 30 tablet 0       Physical Examination:  BP 93/63 (BP Location: Right arm, Patient Position: Chair, Cuff Size: Adult Regular)  Pulse 87  Temp 99.3  F (37.4  C) (Oral)  Resp 16  Ht 1.651 m (5' 5\")  Wt 70.9 kg (156 lb 6.4 oz)  SpO2 98%  BMI 26.03 kg/m2  Wt Readings from Last 10 Encounters:   03/01/18 70.9 kg (156 lb 6.4 oz)   02/20/18 73.2 kg (161 lb 6.4 oz)   02/14/18 72.3 kg (159 lb 6.4 oz)   02/14/18 72.3 kg (159 lb 6.4 oz)   02/11/18 72.6 kg (160 lb)   02/05/18 72.9 kg (160 lb 11.2 oz)   01/31/18 73.2 kg (161 lb 4.8 oz)   01/26/18 76.2 kg (168 lb)   01/15/18 76.6 kg (168 lb 14.4 oz)   01/12/18 76.2 kg (168 lb)     Constitutional: Well-appearing female in no acute distress. Tired appearing   Eyes: EOMI, PERRL. No scleral icterus.  ENT: Oral mucosa is moist without lesions or thrush. Some frontal sinus tenderness   Lymphatic: Neck is supple without cervical or supraclavicular lymphadenopathy.   Cardiovascular: Regular rate and rhythm. No murmurs, gallops, or rubs. No peripheral edema.  Respiratory: Clear to auscultation bilaterally. Coughing there is some bronchospasm. No wheezes or crackles.  Gastrointestinal: Bowel sounds present. Abdomen soft, nontender. No palpable hepatosplenomegaly or masses.   Neurologic: Cranial nerves II through XII are grossly intact.  Skin: No rashes, petechiae, or bruising noted on exposed skin.    Laboratory Data:   3/1/2018 16:19   Sodium 135   Potassium 3.8   Chloride 102   Carbon Dioxide 26   Urea Nitrogen 11   Creatinine 0.65   GFR Estimate >90   GFR Estimate If Black >90   Calcium 9.0   Anion Gap 8   Albumin 3.5   Protein Total 7.3   Bilirubin Total 0.4   Alkaline Phosphatase 216 (H)   ALT 41   AST 23   Glucose 82   WBC 7.0   Hemoglobin 12.7   Hematocrit 39.2   Platelet Count 116 (L)   RBC Count 4.29   MCV 91   MCH 29.6   MCHC " 32.4   RDW 15.8 (H)   Diff Method Manual Differential   % Neutrophils 40.2   % Lymphocytes 33.3   % Monocytes 21.1   % Eosinophils 0.0   % Basophils 1.8   % Metamyelocytes 0.9   % Myelocytes 0.9   % Promyelocytes 1.8   Nucleated RBCs 2 (H)   Absolute Neutrophil 2.8   Absolute Lymphocytes 2.3   Absolute Monocytes 1.5 (H)   Absolute Eosinophils 0.0   Absolute Basophils 0.1   Absolute Metamyelocytes 0.1 (H)   Absolute Myelocytes 0.1 (H)   Absolute Promyelocytes 0.1 (H)   Absolute Nucleated RBC 0.1   Anisocytosis Slight   Polychromasia Slight   Platelet Estimate Confirming automa...   BLOOD CULTURE Rpt     Influenza A/B and RSV pending  BC x 2 pending    CXR benign     Assessment and Plan:  1. Fever, cough: She is not neutropenic, ANC is 2.8. CXR is clear. Lung exam is consistent with bronchitis. Given her immunosuppression, will treat with azithromycin and albuterol. Influenza pending, if positive then will prescribe Tamiflu. Continue to monitor fevers and call us if any worsening symptoms or higher temps after 24 hours.      2. Diarrhea: Likely chemo related given history. Try 1/2 tab of Imodium at a time to help avoid excessive gas pain and constipation.     3. Dehydration: BP is stable but she runs low. Given poor intake and fatigue, will give 1 L IVF today. Goal of 6-8 c of fluids by mouth daily.     Kellie Nobles PA-C  [facility]  [address] [place] [address]  [phone]

## 2018-03-01 NOTE — PATIENT INSTRUCTIONS
Contact Numbers    [facility] Main Line: [phone]  [facility] Triage:  [phone]    Call triage with chills and/or temperature greater than or equal to 100.5, uncontrolled nausea/vomiting, diarrhea, constipation, dizziness, shortness of breath, chest pain, bleeding, unexplained bruising, or any new/concerning symptoms, questions/concerns.     If you are having any concerning symptoms or wish to speak to a provider before your next infusion visit, please call your care coordinator or triage to notify them so we can adequately serve you.       After Hours: [phone]    If after hours, weekends, or holidays, call main hospital  and ask for Oncology doctor on call.         March 2018 Sunday Monday Tuesday Wednesday Thursday Friday Saturday                       1     XR CHEST 2 VIEWS    3:15 PM   (15 min.)   [id number]   [facility] Xray     [facility] DRAW    3:45 PM   (15 min.)    [facility]   [facility]     [facility] RETURN    3:55 PM   (50 min.)   Kellie Nobles PA-C   [facility] ONC INFUSION 120    5:00 PM   (120 min.)   [facility]Y INFUSION   [facility] 2     3       4     5     6     [facility] DRAW    7:15 AM   (15 min.)    [facility]     [facility] RETURN    7:35 AM   (50 min.)   Kellie Nobles PA-C   [facility] ONC INFUSION 360    9:00 AM   (360 min.)   [facility] ONCOLOGY INFUSION   [facility] 7     8     9     10       11     12     13     LEVEL 0    9:00 AM   (30 min.)   ROOM 5 [facility]fusion     CT CHEST/ABDOMEN/PELVIS W    9:45 AM   (30 min.)   [id number] [place] [address] CT 14     15     16     17       18     19     [facility] DRAW    7:00 AM   (15 min.)   [facility] DRAW   [facility] Draw     [facility] RETURN    7:30 AM   (30 min.)   Jovany Monk MD   [facility] ONC INFUSION 360     8:30 AM   (360 min.)    ONCOLOGY INFUSION   [facility] 20     21     22     23     24       25     26     [facility] RETURN   12:45 PM   (30 min.)   Ja Bryd MD   [facility] 27     28     29     30     31 April 2018 Sunday Monday Tuesday Wednesday Thursday Friday Saturday   1     2     LAB WITH [facility]   11:00 AM   (15 min.)    LAB    [facility]     CT ABDOMEN PELVIS WWO   11:25 AM   (20 min.)   UCCT2   [facility] CT     [facility] RETURN    1:15 PM   (30 min.)   Ja Byrd MD   [facility]     PAC EVAL    2:15 PM   (60 min.)   2,  [place]   [facility]     PAC RN ASSESSMENT    3:15 PM   (30 min.)   Rn, [facility]     PAC ANESTHESIA CONSULT    3:45 PM   (10 min.)   Anesthesiologist, [facility] 3     4     5     6     7       8     9     10     11     12     13     14       15     16     17     LAPAROSCOPY DIAGNOSTIC (GENERAL)    8:00 AM   Ja Byrd MD   [facility] OR 18     19     20     21       22     23     24     25     26     27     28       29     30                                           Lab Results:  Recent Results (from the past 12 hour(s))   CBC with platelets differential    Collection Time: 03/01/18  4:19 PM   Result Value Ref Range    WBC 7.0 4.0 - 11.0 10e9/L    RBC Count 4.29 3.8 - 5.2 10e12/L    Hemoglobin 12.7 11.7 - 15.7 g/dL    Hematocrit 39.2 35.0 - 47.0 %    MCV 91 78 - 100 fl    MCH 29.6 26.5 - 33.0 pg    MCHC 32.4 31.5 - 36.5 g/dL    RDW 15.8 (H) 10.0 - 15.0 %    Platelet Count 116 (L) 150 - 450 10e9/L    Diff Method Manual Differential     % Neutrophils 40.2 %    % Lymphocytes 33.3 %    % Monocytes 21.1 %    % Eosinophils 0.0 %    % Basophils 1.8 %    % Metamyelocytes 0.9 %    % Myelocytes 0.9 %    % Promyelocytes 1.8 %    Nucleated RBCs 2 (H) 0 /100    Absolute Neutrophil 2.8 1.6 - 8.3 10e9/L     Absolute Lymphocytes 2.3 0.8 - 5.3 10e9/L    Absolute Monocytes 1.5 (H) 0.0 - 1.3 10e9/L    Absolute Eosinophils 0.0 0.0 - 0.7 10e9/L    Absolute Basophils 0.1 0.0 - 0.2 10e9/L    Absolute Metamyelocytes 0.1 (H) 0 10e9/L    Absolute Myelocytes 0.1 (H) 0 10e9/L    Absolute Promyeloctyes 0.1 (H) 0 10e9/L    Absolute Nucleated RBC 0.1     Anisocytosis Slight     Polychromasia Slight     Platelet Estimate Confirming automated cell count    Comprehensive metabolic panel    Collection Time: 03/01/18  4:19 PM   Result Value Ref Range    Sodium 135 133 - 144 mmol/L    Potassium 3.8 3.4 - 5.3 mmol/L    Chloride 102 94 - 109 mmol/L    Carbon Dioxide 26 20 - 32 mmol/L    Anion Gap 8 3 - 14 mmol/L    Glucose 82 70 - 99 mg/dL    Urea Nitrogen 11 7 - 30 mg/dL    Creatinine 0.65 0.52 - 1.04 mg/dL    GFR Estimate >90 >60 mL/min/1.7m2    GFR Estimate If Black >90 >60 mL/min/1.7m2    Calcium 9.0 8.5 - 10.1 mg/dL    Bilirubin Total 0.4 0.2 - 1.3 mg/dL    Albumin 3.5 3.4 - 5.0 g/dL    Protein Total 7.3 6.8 - 8.8 g/dL    Alkaline Phosphatase 216 (H) 40 - 150 U/L    ALT 41 0 - 50 U/L    AST 23 0 - 45 U/L

## 2018-03-01 NOTE — TELEPHONE ENCOUNTER
Per Dr. Monk: needs to be seen here and get a CXR.     Paged Kellie to OK adding patient on today, per Kellie: CBC, CMP, Influenza rapid, blood cultures x 2 (port/peripheral) CXR     Called Kitty back and advised clinic visit here with Kellie, CXR and labs. She agreed to appointment times and has transportation to the clinic.

## 2018-03-01 NOTE — TELEPHONE ENCOUNTER
Received a message from [facility] Jseica Hewitt regarding patient's self report a fever and request to obtain more information.     Called Kitty, she reports the following:     T 100.4 yesterday and this morning. Has been using tylenol 1000mg for fever. Last took 1000mg at 10am this morning. Fever seems to respond to Tylenol and is reduced temporarily. Dry cough x 2 days. Headaches begin when fever starts to increase. She also reports some intermittent chills. She notices slight wheezing when she lies flat. She is more SOB with activity (walking up stairs). Denies SOB at rest. Also reports  had pneumonia and was diagnosed on 2/25. Started on antibiotics and he seems to be improving. He was negative for influenza. Kitty said she can eat and drink OK but has noticed she's drinking less fluids, trying to push fluids today. Fleeting abdominal pain yesterday and suffered from constipation that turned into diarrhea. Yesterday had 2-3 soft stools and 3 watery stools. She took 1 immodium and had no further loose stools.     Last chemotherapy infusion on 2/20/18.     Paged Dr. Monk regarding the above.

## 2018-03-01 NOTE — NURSING NOTE
Chief Complaint   Patient presents with     Port Draw     Labs drawn via port and venipuncture by RN     BP 93/63 (BP Location: Right arm, Patient Position: Chair, Cuff Size: Adult Regular)  Pulse 87  Temp 99.3  F (37.4  C) (Oral)  Wt 70.9 kg (156 lb 6.4 oz)  SpO2 98%  BMI 26.03 kg/m2    Vitals taken. Labs collected and sent from left antecubital venipuncture.Port accessed by RN. Blood Culture collected and sent. Line flushed with NS & Heparin. Pt tolerated well. Pt checked in for next appointment.        Isabel Weaver RN

## 2018-03-01 NOTE — MR AVS SNAPSHOT
After Visit Summary   3/1/2018    Kitty Bangura    MRN: [id number]           Patient Information     Date Of Birth          1960        Visit Information        Provider Department      3/1/2018 5:00 PM UC 30 ATC;  ONCOLOGY INFUSION [facility]        Care Instructions    Contact Numbers    [place] Main Line: [phone]  [place] Triage:  [phone]    Call triage with chills and/or temperature greater than or equal to 100.5, uncontrolled nausea/vomiting, diarrhea, constipation, dizziness, shortness of breath, chest pain, bleeding, unexplained bruising, or any new/concerning symptoms, questions/concerns.     If you are having any concerning symptoms or wish to speak to a provider before your next infusion visit, please call your care coordinator or triage to notify them so we can adequately serve you.       After Hours: [phone]    If after hours, weekends, or holidays, call main hospital  and ask for Oncology doctor on call.         March 2018 Sunday Monday Tuesday Wednesday Thursday Friday Saturday                       1     XR CHEST 2 VIEWS    3:15 PM   (15 min.)   UCXR1   [facility] Xray     [facility] LAB DRAW    3:45 PM   (15 min.)   [facility] DRAW   [facility] Lab Draw     P RETURN    3:55 PM   (50 min.)   [facility] Nobles [facility]    5:00 PM   (120 min.)    ONCOLOGY INFUSION   [facility] 2     3       4     5     6     [facility] DRAW    7:15 AM   (15 min.)    [facility] LAB DRAW   [facility] Lab Draw     P RETURN    7:35 AM   (50 min.)   [facility]    9:00 AM   (360 min.)    ONCOLOGY INFUSION   [facility] 7     8     9     10       11     12     13     LEVEL 0    9:00 AM   (30 min.)   ROOM 5 [facility] Cancer Infusion     CT  CHEST/ABDOMEN/PELVIS W    9:45 AM   (30 min.)   WYCT1   [facility] CT 14     15     16     17       18     19     [facility]    7:00 AM   (15 min.)   [facility] LAB DRAW   [facility] Lab Draw     [facility] RETURN    7:30 AM   (30 min.)   Jovany Monk MD   [facility]     [facility] ONC INFUSION 360    8:30 AM   (360 min.)    ONCOLOGY INFUSION   [facility] 20     21     22     23     24       25     26     [facility] RETURN   12:45 PM   (30 min.)   Ja Byrd MD   [facility] 27     28     29     30     31 April 2018 Sunday Monday Tuesday Wednesday Thursday Friday Saturday   1     2     LAB WITH  CLINIC   11:00 AM   (15 min.)    LAB   [facility]     CT ABDOMEN PELVIS WWO   11:25 AM   (20 min.)   UCCT2   [facility] [place] [facility]     [facility] RETURN    1:15 PM   (30 min.)   Ja Byrd MD   [facility]     PAC EVAL    2:15 PM   (60 min.)   2,  [place]   [facility]     PAC RN ASSESSMENT    3:15 PM   (30 min.)   Rn, [facility]     PAC ANESTHESIA CONSULT    3:45 PM   (10 min.)   Anesthesiologist, [facility] 3     4     5     6     7       8     9     10     11     12     13     14       15     16     17     LAPAROSCOPY DIAGNOSTIC (GENERAL)    8:00 AM   Ja Byrd MD   [facility] OR 18     19     20     21       22     23     24     25     26     27     28       29     30                                           Lab Results:  Recent Results (from the past 12 hour(s))   CBC with platelets differential    Collection Time: 03/01/18  4:19 PM   Result Value Ref Range    WBC 7.0 4.0 - 11.0 10e9/L    RBC Count 4.29 3.8 - 5.2 10e12/L    Hemoglobin 12.7 11.7 - 15.7 g/dL    Hematocrit 39.2 35.0 - 47.0 %    MCV 91 78 - 100 fl    MCH 29.6 26.5 - 33.0 pg    MCHC 32.4 31.5 - 36.5 g/dL    RDW 15.8 (H) 10.0 - 15.0 %    Platelet  Count 116 (L) 150 - 450 10e9/L    Diff Method Manual Differential     % Neutrophils 40.2 %    % Lymphocytes 33.3 %    % Monocytes 21.1 %    % Eosinophils 0.0 %    % Basophils 1.8 %    % Metamyelocytes 0.9 %    % Myelocytes 0.9 %    % Promyelocytes 1.8 %    Nucleated RBCs 2 (H) 0 /100    Absolute Neutrophil 2.8 1.6 - 8.3 10e9/L    Absolute Lymphocytes 2.3 0.8 - 5.3 10e9/L    Absolute Monocytes 1.5 (H) 0.0 - 1.3 10e9/L    Absolute Eosinophils 0.0 0.0 - 0.7 10e9/L    Absolute Basophils 0.1 0.0 - 0.2 10e9/L    Absolute Metamyelocytes 0.1 (H) 0 10e9/L    Absolute Myelocytes 0.1 (H) 0 10e9/L    Absolute Promyeloctyes 0.1 (H) 0 10e9/L    Absolute Nucleated RBC 0.1     Anisocytosis Slight     Polychromasia Slight     Platelet Estimate Confirming automated cell count    Comprehensive metabolic panel    Collection Time: 03/01/18  4:19 PM   Result Value Ref Range    Sodium 135 133 - 144 mmol/L    Potassium 3.8 3.4 - 5.3 mmol/L    Chloride 102 94 - 109 mmol/L    Carbon Dioxide 26 20 - 32 mmol/L    Anion Gap 8 3 - 14 mmol/L    Glucose 82 70 - 99 mg/dL    Urea Nitrogen 11 7 - 30 mg/dL    Creatinine 0.65 0.52 - 1.04 mg/dL    GFR Estimate >90 >60 mL/min/1.7m2    GFR Estimate If Black >90 >60 mL/min/1.7m2    Calcium 9.0 8.5 - 10.1 mg/dL    Bilirubin Total 0.4 0.2 - 1.3 mg/dL    Albumin 3.5 3.4 - 5.0 g/dL    Protein Total 7.3 6.8 - 8.8 g/dL    Alkaline Phosphatase 216 (H) 40 - 150 U/L    ALT 41 0 - 50 U/L    AST 23 0 - 45 U/L               Follow-ups after your visit        Your next 10 appointments already scheduled     Mar 06, 2018  7:15 AM [facility]   [facility] Lab Draw with  [facility] LAB DRAW   [facility] Draw ([facility])    [address] [place] [address]   [phone]            Mar 06, 2018  7:50 AM CST   (Arrive by 7:35 AM)   Return Visit with FOR[facility] ([facility])    [address] [place] [address]  [address]  [facility] [address]   [phone]            Mar 06, 2018  9:00 AM CST   Infusion 360 with  ONCOLOGY INFUSION, UC 19 [facility] ([facility])    [address]  [address]  [facility] [address]   [phone]            Mar 13, 2018  9:00 AM CDT   Level O with ROOM 5 [facility] Infusion ([facility])    [facility]  [address]  [facility] [address]   [phone]            Mar 13, 2018 10:00 AM CDT   (Arrive by 9:45 AM)   CT CHEST/ABDOMEN/PELVIS W CONTRAST with WYCT1   [facility] CT ([facility])    [address] [place]  [facility] [address]   [phone]           Please bring any scans or X-rays taken at other hospitals, if similar tests were done. Also bring a list of your medicines, including vitamins, minerals and over-the-counter drugs. It is safest to leave personal items at home.  Be sure to tell your doctor:   If you have any allergies.   If there s any chance you are pregnant.   If you are breastfeeding.  You may have contrast for this exam. To prepare:   Do not eat or drink for 2 hours before your exam. If you need to take medicine, you may take it with small sips of water. (We may ask you to take liquid medicine as well.)   The day before your exam, drink extra fluids at least six 8-ounce glasses (unless your doctor tells you to restrict your fluids).   You will be given instructions on how to drink the contrast.  Patients over 70 or patients with diabetes or kidney problems:   If you haven t had a blood test (creatinine test) within the last 30 days, the Cardiologist/Radiologist may require you to get this test prior to your exam.  If you have diabetes:   Continue to take your metformin medication on the day of your exam  Please wear loose clothing, such as a sweat suit or jogging clothes. Avoid snaps, zippers and other metal. We may ask you to  undress and put on a hospital gown.  If you have any questions, please call the Imaging Department where you will have your exam.            Mar 19, 2018  7:00 AM CDT   [facility] Lab Draw with UC [facility] LAB DRAW   [facility] Lab Draw ([facility])    [address]  [address]  [facility] [address] [phone]            Mar 19, 2018  7:45 AM CDT   (Arrive by 7:30 AM)   Return Visit with Jovany Monk MD   [facility] ([facility])    [address]  [address]  [facility] [id number] [phone]            Mar 19, 2018  8:30 AM CDT   Infusion 360 with  ONCOLOGY INFUSION, UC 18 ATC   [facility] ([facility])    [address] [place] [address]  [address]  [facility] [id number] [phone]              Who to contact     If you have questions or need follow up information about today's clinic visit or your schedule please contact [facility] CANCER [facility] [place] directly at [phone].  Normal or non-critical lab and imaging results will be communicated to you by MyChart, letter or phone within 4 business days after the clinic has received the results. If you do not hear from us within 7 days, please contact the clinic through [employer] or phone. If you have a critical or abnormal lab result, we will notify you by phone as soon as possible.  Submit refill requests through [employer] or call your pharmacy and they will forward the refill request to us. Please allow 3 business days for your refill to be completed.          Additional Information About Your Visit        MyChart Information     [employer] gives you secure access to your electronic health record. If you see a primary care provider, you can also send messages to your care team and make appointments. If you have questions, please call your primary care clinic.  If you do not have a primary care provider, please call [phone] and  they will assist you.        Care EveryWhere ID     This is your Care EveryWhere ID. This could be used by other organizations to access your [facility] medical records  [id number]         Blood Pressure from Last 3 Encounters:   03/01/18 93/63   02/22/18 110/69   02/20/18 103/72    Weight from Last 3 Encounters:   03/01/18 70.9 kg (156 lb 6.4 oz)   02/20/18 73.2 kg (161 lb 6.4 oz)   02/14/18 72.3 kg (159 lb 6.4 oz)              Today, you had the following     No orders found for display         Today's Medication Changes          These changes are accurate as of 3/1/18  5:59 PM.  If you have any questions, ask your nurse or doctor.               Start taking these medicines.        Dose/Directions    albuterol 108 (90 BASE) MCG/ACT Inhaler   Commonly known as:  PROAIR HFA/PROVENTIL HFA/VENTOLIN HFA   Used for:  Acute bronchitis, unspecified organism   Started by:  Kellie Nobles PA-C        Dose:  2 puff   Inhale 2 puffs into the lungs every 6 hours as needed for shortness of breath / dyspnea or wheezing   Quantity:  1 Inhaler   Refills:  0       azithromycin 250 MG tablet   Commonly known as:  ZITHROMAX   Used for:  Acute bronchitis, unspecified organism   Started by:  Kellie Nobles PA-C        Take 2 tablets by mouth today and 1 tablet starting tomorrow for 4 days.   Quantity:  6 tablet   Refills:  0            Where to get your medicines      These medications were sent to [facility] [address] [address]    Hours:  TRANSPLANT PHONE NUMBER [phone] Phone:  [phone]     albuterol 108 (90 BASE) MCG/ACT Inhaler    azithromycin 250 MG tablet                Primary Care Provider Office Phone # Fax #    Solange Mcgill [phone] [address] [facility] [address]        Equal Access to Services     CHASE DE GUZMAN AH: solange Stern  adridavid re hernandez. So [facility] [phone].    ATENCIÓN: Si odessa vasquez, tiene a quiros disposición servicios gratuitos de asistencia lingüística. Macy al [phone].    We comply with applicable federal civil rights laws and [place] laws. We do not discriminate on the basis of race, color, national origin, age, disability, sex, sexual orientation, or gender identity.            Thank you!     Thank you for choosing [facility]CER [facility]  for your care. Our goal is always to provide you with excellent care. Hearing back from our patients is one way we can continue to improve our services. Please take a few minutes to complete the written survey that you may receive in the mail after your visit with us. Thank you!             Your Updated Medication List - Protect others around you: Learn how to safely use, store and throw away your medicines at www.disposemymeds.org.          This list is accurate as of 3/1/18  5:59 PM.  Always use your most recent med list.                   Brand Name Dispense Instructions for use Diagnosis    albuterol 108 (90 BASE) MCG/ACT Inhaler    PROAIR HFA/PROVENTIL HFA/VENTOLIN HFA    1 Inhaler    Inhale 2 puffs into the lungs every 6 hours as needed for shortness of breath / dyspnea or wheezing    Acute bronchitis, unspecified organism       amylase-lipase-protease 17968-45729 UNITS Cpep per EC capsule    CREON    450 capsule    Take 2-3 with meals / 1-2 with snacks, up to 15 per day.    Necrotizing pancreatitis       azithromycin 250 MG tablet    ZITHROMAX    6 tablet    Take 2 tablets by mouth today and 1 tablet starting tomorrow for 4 days.    Acute bronchitis, unspecified organism       dexamethasone 4 MG tablet    DECADRON    12 tablet    Take 2 tablets (8 mg) by mouth daily (with breakfast)    Pancreatic adenocarcinoma (H)       Fluorouracil 5 GM/100ML Soln    ADURCIL          HYDROcodone-acetaminophen 5-325 MG  per tablet    NORCO          HYDROmorphone 2 MG tablet    DILAUDID    25 tablet    Take 1 tablet (2 mg) by mouth every 4 hours as needed for pain maximum 4 tablet(s) per day        LORazepam 0.5 MG tablet    ATIVAN    30 tablet    Take 1 tablet (0.5 mg) by mouth every 4 hours as needed (Anxiety, Nausea/Vomiting or Sleep)    Pancreatic adenocarcinoma (H)       ondansetron 8 MG tablet    ZOFRAN    30 tablet    Take 1 tablet (8 mg) by mouth every 8 hours as needed for nausea    Pancreatic adenocarcinoma (H)       oxyCODONE IR 5 MG tablet    ROXICODONE    30 tablet    Take 1 tablet (5 mg) by mouth every 4 hours as needed for moderate to severe pain    Necrotizing pancreatitis, Pancreatic adenocarcinoma (H)       prochlorperazine 10 MG tablet    COMPAZINE    30 tablet    Take 1 tablet (10 mg) by mouth every 6 hours as needed (Nausea/Vomiting)    Pancreatic adenocarcinoma (H)

## 2018-03-01 NOTE — MR AVS SNAPSHOT
After Visit Summary   3/1/2018    Kitty Bangura    MRN: [id number]           Patient Information     Date Of Birth          1960        Visit Information        Provider Department      3/1/2018 4:10 PM Kellie Nobles PA-C [facility]        Today's Diagnoses     Acute bronchitis, unspecified organism    -  1    Pancreatic adenocarcinoma (H)          Care Instructions    Preventive Care:     Colorectal Cancer Screening: During our visit today, we discussed that it is recommended you receive colorectal cancer screening. Please call or make an appointment with your primary care provider to discuss this. You may also call the [facility] scheduling line ([phone]) to set up a colonoscopy appointment.            Follow-ups after your visit        Your next 10 appointments already scheduled     Mar 06, 2018  7:15 AM CST   [facility] Lab Draw with [facility]ONIC LAB DRAW   [facility] Lab Draw ([facility])    [address] [place] [address]  [address]  [facility] [address]   [phone]            Mar 06, 2018  7:50 AM CST   (Arrive by 7:35 AM)   Return Visit with FORREST Ackerman [facility] ([facility])    [address] [place] [address]  [address]  [facility] [address]   [phone]            Mar 06, 2018  9:00 AM CST   Infusion 360 with  ONCOLOGY INFUSION, UC 19 ATC   [facility] ([facility])    [address] [place] [address]  [address]  [facility] [address]   [phone]            Mar 13, 2018  9:00 AM CDT   Level O with ROOM 5 [facility] ([facility])    [facility]  [address]  [place] [address]   [phone]            Mar 13, 2018 10:00 AM CDT   (Arrive by 9:45 AM)   CT CHEST/ABDOMEN/PELVIS W CONTRAST with WYCT1   [facility] ([facility])    [address]  [place] [address]   [phone]           Please bring any scans or X-rays taken at other hospitals, if similar tests were done. Also bring a list of your medicines, including vitamins, minerals and over-the-counter drugs. It is safest to leave personal items at home.  Be sure to tell your doctor:   If you have any allergies.   If there s any chance you are pregnant.   If you are breastfeeding.  You may have contrast for this exam. To prepare:   Do not eat or drink for 2 hours before your exam. If you need to take medicine, you may take it with small sips of water. (We may ask you to take liquid medicine as well.)   The day before your exam, drink extra fluids at least six 8-ounce glasses (unless your doctor tells you to restrict your fluids).   You will be given instructions on how to drink the contrast.  Patients over 70 or patients with diabetes or kidney problems:   If you haven t had a blood test (creatinine test) within the last 30 days, the Cardiologist/Radiologist may require you to get this test prior to your exam.  If you have diabetes:   Continue to take your metformin medication on the day of your exam  Please wear loose clothing, such as a sweat suit or jogging clothes. Avoid snaps, zippers and other metal. We may ask you to undress and put on a hospital gown.  If you have any questions, please call the Imaging Department where you will have your exam.            Mar 19, 2018  7:00 AM CDT   [facility] Lab Draw with UC MASONIC LAB DRAW   [facility] Lab Draw ([facility])    [address] [place] [address]  [address]  [facility] [address] [phone]            Mar 19, 2018  7:45 AM CDT   (Arrive by 7:30 AM)   Return Visit with Jovany Monk MD   [facility] ([facility])    [address] [place] [address]  [address]  [facility] [address] [phone]            Mar 19, 2018  8:30 AM CDT   Infusion 360 with  ONCOLOGY INFUSION,   "18 ATC   [facility] ([facility])    [address] [facility]  [address]  [facility] [address] [phone]              Who to contact     If you have questions or need follow up information about today's clinic visit or your schedule please contact [facility] directly at [phone].  Normal or non-critical lab and imaging results will be communicated to you by MyChart, letter or phone within 4 business days after the clinic has received the results. If you do not hear from us within 7 days, please contact the clinic through [employer] or phone. If you have a critical or abnormal lab result, we will notify you by phone as soon as possible.  Submit refill requests through [employer] or call your pharmacy and they will forward the refill request to us. Please allow 3 business days for your refill to be completed.          Additional Information About Your Visit        [employer] Information     [employer] gives you secure access to your electronic health record. If you see a primary care provider, you can also send messages to your care team and make appointments. If you have questions, please call your primary care clinic.  If you do not have a primary care provider, please call [phone] and they will assist you.        Care EveryWhere ID     This is your Care EveryWhere ID. This could be used by other organizations to access your [facility] medical records  [id number]        Your Vitals Were     Pulse Temperature Respirations Height Pulse Oximetry BMI (Body Mass Index)    87 99.3  F (37.4  C) (Oral) 16 1.651 m (5' 5\") 98% 26.03 kg/m2       Blood Pressure from Last 3 Encounters:   03/01/18 93/63   02/22/18 110/69   02/20/18 103/72    Weight from Last 3 Encounters:   03/01/18 70.9 kg (156 lb 6.4 oz)   02/20/18 73.2 kg (161 lb 6.4 oz)   02/14/18 72.3 kg (159 lb 6.4 oz)              We Performed the Following     Blood culture     Blood culture     CBC with " platelets differential     Comprehensive metabolic panel     Influenza A and B and RSV PCR          Today's Medication Changes          These changes are accurate as of 3/1/18  5:57 PM.  If you have any questions, ask your nurse or doctor.               Start taking these medicines.        Dose/Directions    albuterol 108 (90 BASE) MCG/ACT Inhaler   Commonly known as:  PROAIR HFA/PROVENTIL HFA/VENTOLIN HFA   Used for:  Acute bronchitis, unspecified organism   Started by:  Kellie Nobles PA-C        Dose:  2 puff   Inhale 2 puffs into the lungs every 6 hours as needed for shortness of breath / dyspnea or wheezing   Quantity:  1 Inhaler   Refills:  0       azithromycin 250 MG tablet   Commonly known as:  ZITHROMAX   Used for:  Acute bronchitis, unspecified organism   Started by:  Kellie Nobles PA-C        Take 2 tablets by mouth today and 1 tablet starting tomorrow for 4 days.   Quantity:  6 tablet   Refills:  0            Where to get your medicines      These medications were sent to [address] [place] [address] [address] [place] [address]    Hours:  TRANSPLANT PHONE NUMBER [phone] Phone:  [phone]     albuterol 108 (90 BASE) MCG/ACT Inhaler    azithromycin 250 MG tablet                Primary Care Provider Office Phone # Fax #    Deejayshari Julito Mcgill [phone] [address] [facility] [address]        Equal Access to Services     [facility] Hospital[facility]AN[facility] AH: Hadii zaheer chino Sopaulo, waaxda luqadaha, qaybta kaalmada adeegyada, waxay ridge yip. So [facility] [phone].    ATENCIÓN: Si habla español, tiene a quiros disposición servicios gratuitos de asistencia lingüística. Llame al [phone].    We comply with applicable federal civil rights laws and [place] laws. We do not discriminate on the basis of race, color, national origin, age, disability, sex, sexual  orientation, or gender identity.            Thank you!     Thank you for choosing [facility]  for your care. Our goal is always to provide you with excellent care. Hearing back from our patients is one way we can continue to improve our services. Please take a few minutes to complete the written survey that you may receive in the mail after your visit with us. Thank you!             Your Updated Medication List - Protect others around you: Learn how to safely use, store and throw away your medicines at www.disposemymeds.org.          This list is accurate as of 3/1/18  5:57 PM.  Always use your most recent med list.                   Brand Name Dispense Instructions for use Diagnosis    albuterol 108 (90 BASE) MCG/ACT Inhaler    PROAIR HFA/PROVENTIL HFA/VENTOLIN HFA    1 Inhaler    Inhale 2 puffs into the lungs every 6 hours as needed for shortness of breath / dyspnea or wheezing    Acute bronchitis, unspecified organism       amylase-lipase-protease [address] UNITS Cpep per EC capsule    CREON    450 capsule    Take 2-3 with meals / 1-2 with snacks, up to 15 per day.    Necrotizing pancreatitis       azithromycin 250 MG tablet    ZITHROMAX    6 tablet    Take 2 tablets by mouth today and 1 tablet starting tomorrow for 4 days.    Acute bronchitis, unspecified organism       dexamethasone 4 MG tablet    DECADRON    12 tablet    Take 2 tablets (8 mg) by mouth daily (with breakfast)    Pancreatic adenocarcinoma (H)       Fluorouracil 5 GM/100ML Soln    ADURCIL          HYDROcodone-acetaminophen 5-325 MG per tablet    NORCO          HYDROmorphone 2 MG tablet    DILAUDID    25 tablet    Take 1 tablet (2 mg) by mouth every 4 hours as needed for pain maximum 4 tablet(s) per day        LORazepam 0.5 MG tablet    ATIVAN    30 tablet    Take 1 tablet (0.5 mg) by mouth every 4 hours as needed (Anxiety, Nausea/Vomiting or Sleep)    Pancreatic adenocarcinoma (H)       ondansetron 8 MG tablet    ZOFRAN    30  tablet    Take 1 tablet (8 mg) by mouth every 8 hours as needed for nausea    Pancreatic adenocarcinoma (H)       oxyCODONE IR 5 MG tablet    ROXICODONE    30 tablet    Take 1 tablet (5 mg) by mouth every 4 hours as needed for moderate to severe pain    Necrotizing pancreatitis, Pancreatic adenocarcinoma (H)       prochlorperazine 10 MG tablet    COMPAZINE    30 tablet    Take 1 tablet (10 mg) by mouth every 6 hours as needed (Nausea/Vomiting)    Pancreatic adenocarcinoma (H)

## 2018-03-01 NOTE — NURSING NOTE
"Oncology Rooming Note    March 1, 2018 4:24 PM   Kitty Bangura is a 57 year old female who presents for:    Chief Complaint   Patient presents with     Port Draw     Labs drawn via port and venipuncture by RN     Oncology Clinic Visit     Return: Pancreatic ca      Initial Vitals: BP 93/63 (BP Location: Right arm, Patient Position: Chair, Cuff Size: Adult Regular)  Pulse 87  Temp 99.3  F (37.4  C) (Oral)  Resp 16  Ht 1.651 m (5' 5\")  Wt 70.9 kg (156 lb 6.4 oz)  SpO2 98%  BMI 26.03 kg/m2 Estimated body mass index is 26.03 kg/(m^2) as calculated from the following:    Height as of this encounter: 1.651 m (5' 5\").    Weight as of this encounter: 70.9 kg (156 lb 6.4 oz). Body surface area is 1.8 meters squared.  No Pain (0) Comment: Data Unavailable   No LMP recorded. Patient is postmenopausal.  Allergies reviewed: YES  Medications reviewed: YES    Medications: Medication refills not needed today.  Pharmacy name entered into Jumo:    [id number] - [place], [place] - [address] [place] [address] [facility] - [place], [place] - [address] AT [facility] OF [address] [place] [address]    Clinical concerns: no new concerns.  Pt received flu shot elsewhere. See Immunizations    pt did not have colonoscopy done yet, contact information has been added to AVS.        6 minutes for nursing intake (face to face time)     Blanquita Pollard CMA                "

## 2018-03-01 NOTE — PATIENT INSTRUCTIONS
Preventive Care:     Colorectal Cancer Screening: During our visit today, we discussed that it is recommended you receive colorectal cancer screening. Please call or make an appointment with your primary care provider to discuss this. You may also call the [employer] scheduling line ([phone]) to set up a colonoscopy appointment.

## 2018-03-02 NOTE — PROGRESS NOTES
Infusion Nursing Note:  Kitty Bangura presents today for IVF.    Patient seen by provider today: Yes: Kellie Nobles PA-C   present during visit today: Not Applicable.    Note: Pt to receive 1 liter NS today    Intravenous Access:  Implanted Port.    Treatment Conditions:  Lab Results   Component Value Date    HGB 12.7 03/01/2018     Lab Results   Component Value Date    WBC 7.0 03/01/2018      Lab Results   Component Value Date    ANEU 2.8 03/01/2018     Lab Results   Component Value Date     03/01/2018      Lab Results   Component Value Date     03/01/2018                   Lab Results   Component Value Date    POTASSIUM 3.8 03/01/2018           Lab Results   Component Value Date    MAG 1.9 01/27/2018            Lab Results   Component Value Date    CR 0.65 03/01/2018                   Lab Results   Component Value Date    ILIANA 9.0 03/01/2018                Lab Results   Component Value Date    BILITOTAL 0.4 03/01/2018           Lab Results   Component Value Date    ALBUMIN 3.5 03/01/2018                    Lab Results   Component Value Date    ALT 41 03/01/2018           Lab Results   Component Value Date    AST 23 03/01/2018       Results reviewed, labs MET treatment parameters, ok to proceed with treatment.      Post Infusion Assessment:  Patient tolerated infusion without incident.  Blood return noted pre and post infusion.  Site patent and intact, free from redness, edema or discomfort.  No evidence of extravasations.  Access discontinued per protocol.    Discharge Plan:   Patient declined prescription refills.  Discharge instructions reviewed with: Patient.  Patient and/or family verbalized understanding of discharge instructions and all questions answered.  Copy of AVS reviewed with patient and/or family.  Patient will return 3/6/18 for next appointment.  Patient discharged in stable condition accompanied by: .  Departure Mode: Ambulatory.    Wanda Claudio,  RN

## 2018-03-03 ASSESSMENT — ENCOUNTER SYMPTOMS
DYSPNEA ON EXERTION: 0
SHORTNESS OF BREATH: 1
MYALGIAS: 0
NAIL CHANGES: 0
LOSS OF CONSCIOUSNESS: 0
CONSTIPATION: 1
FATIGUE: 1
COUGH DISTURBING SLEEP: 1
MEMORY LOSS: 0
DIFFICULTY URINATING: 0
ALTERED TEMPERATURE REGULATION: 1
NERVOUS/ANXIOUS: 1
POLYDIPSIA: 0
ABDOMINAL PAIN: 1
TROUBLE SWALLOWING: 0
HEMATURIA: 0
NUMBNESS: 0
INSOMNIA: 1
SORE THROAT: 0
SMELL DISTURBANCE: 0
COUGH: 1
EYE IRRITATION: 0
BLOOD IN STOOL: 0
POOR WOUND HEALING: 0
HYPERTENSION: 0
EYE WATERING: 1
ARTHRALGIAS: 1
WEAKNESS: 1
DIZZINESS: 1
WHEEZING: 1
HYPOTENSION: 1
FLANK PAIN: 0
DIARRHEA: 1
SINUS CONGESTION: 1
NECK PAIN: 0
PANIC: 0
EYE REDNESS: 0
SLEEP DISTURBANCES DUE TO BREATHING: 0
DEPRESSION: 0
ORTHOPNEA: 1
VOMITING: 0
WEIGHT LOSS: 1
WEIGHT GAIN: 0
NAUSEA: 1
HEMOPTYSIS: 0
FEVER: 1
EYE PAIN: 0
SPEECH CHANGE: 0
DECREASED APPETITE: 0
HEARTBURN: 1
NECK MASS: 0
HOARSE VOICE: 1
BACK PAIN: 0
SEIZURES: 0
INCREASED ENERGY: 1
LIGHT-HEADEDNESS: 1
SYNCOPE: 0
NIGHT SWEATS: 1
TREMORS: 0
DISTURBANCES IN COORDINATION: 0
POLYPHAGIA: 0
DYSURIA: 0
LEG PAIN: 0
PARALYSIS: 0
RECTAL PAIN: 0
MUSCLE WEAKNESS: 1
CHILLS: 1
SINUS PAIN: 0
JOINT SWELLING: 0
TINGLING: 1
DOUBLE VISION: 0
HALLUCINATIONS: 0
DECREASED CONCENTRATION: 1
BOWEL INCONTINENCE: 0
SNORES LOUDLY: 0
MUSCLE CRAMPS: 0
JAUNDICE: 0
BLOATING: 1
STIFFNESS: 0
EXERCISE INTOLERANCE: 0
POSTURAL DYSPNEA: 1
SPUTUM PRODUCTION: 1
PALPITATIONS: 0
SKIN CHANGES: 0
TASTE DISTURBANCE: 1
HEADACHES: 1

## 2018-03-06 ENCOUNTER — ONCOLOGY VISIT (OUTPATIENT)
Dept: ONCOLOGY | Facility: CLINIC | Age: 58
End: 2018-03-06
Attending: PHYSICIAN ASSISTANT
Payer: COMMERCIAL

## 2018-03-06 ENCOUNTER — INFUSION THERAPY VISIT (OUTPATIENT)
Dept: ONCOLOGY | Facility: CLINIC | Age: 58
End: 2018-03-06
Attending: INTERNAL MEDICINE
Payer: COMMERCIAL

## 2018-03-06 ENCOUNTER — APPOINTMENT (OUTPATIENT)
Dept: LAB | Facility: CLINIC | Age: 58
End: 2018-03-06
Attending: INTERNAL MEDICINE
Payer: COMMERCIAL

## 2018-03-06 VITALS
HEART RATE: 69 BPM | TEMPERATURE: 96.9 F | DIASTOLIC BLOOD PRESSURE: 66 MMHG | SYSTOLIC BLOOD PRESSURE: 91 MMHG | OXYGEN SATURATION: 98 % | HEIGHT: 65 IN | BODY MASS INDEX: 26.29 KG/M2 | WEIGHT: 157.8 LBS | RESPIRATION RATE: 16 BRPM

## 2018-03-06 DIAGNOSIS — C25.9 PANCREATIC ADENOCARCINOMA (H): Primary | ICD-10-CM

## 2018-03-06 LAB
ALBUMIN SERPL-MCNC: 3.2 G/DL (ref 3.4–5)
ALP SERPL-CCNC: 191 U/L (ref 40–150)
ALT SERPL W P-5'-P-CCNC: 42 U/L (ref 0–50)
ANION GAP SERPL CALCULATED.3IONS-SCNC: 8 MMOL/L (ref 3–14)
ANISOCYTOSIS BLD QL SMEAR: SLIGHT
AST SERPL W P-5'-P-CCNC: 42 U/L (ref 0–45)
BASOPHILS # BLD AUTO: 0 10E9/L (ref 0–0.2)
BASOPHILS NFR BLD AUTO: 0 %
BILIRUB SERPL-MCNC: 0.3 MG/DL (ref 0.2–1.3)
BUN SERPL-MCNC: 14 MG/DL (ref 7–30)
CALCIUM SERPL-MCNC: 8.9 MG/DL (ref 8.5–10.1)
CHLORIDE SERPL-SCNC: 106 MMOL/L (ref 94–109)
CO2 SERPL-SCNC: 26 MMOL/L (ref 20–32)
CREAT SERPL-MCNC: 0.61 MG/DL (ref 0.52–1.04)
DIFFERENTIAL METHOD BLD: ABNORMAL
EOSINOPHIL # BLD AUTO: 0 10E9/L (ref 0–0.7)
EOSINOPHIL NFR BLD AUTO: 0 %
ERYTHROCYTE [DISTWIDTH] IN BLOOD BY AUTOMATED COUNT: 17.1 % (ref 10–15)
GFR SERPL CREATININE-BSD FRML MDRD: >90 ML/MIN/1.7M2
GLUCOSE SERPL-MCNC: 105 MG/DL (ref 70–99)
HCT VFR BLD AUTO: 36.7 % (ref 35–47)
HGB BLD-MCNC: 11.8 G/DL (ref 11.7–15.7)
LYMPHOCYTES # BLD AUTO: 3.6 10E9/L (ref 0.8–5.3)
LYMPHOCYTES NFR BLD AUTO: 15.4 %
MACROCYTES BLD QL SMEAR: PRESENT
MCH RBC QN AUTO: 29.5 PG (ref 26.5–33)
MCHC RBC AUTO-ENTMCNC: 32.2 G/DL (ref 31.5–36.5)
MCV RBC AUTO: 92 FL (ref 78–100)
METAMYELOCYTES # BLD: 1 10E9/L
METAMYELOCYTES NFR BLD MANUAL: 4.3 %
MONOCYTES # BLD AUTO: 1.2 10E9/L (ref 0–1.3)
MONOCYTES NFR BLD AUTO: 5.1 %
MYELOCYTES # BLD: 0.4 10E9/L
MYELOCYTES NFR BLD MANUAL: 1.7 %
NEUTROPHILS # BLD AUTO: 17.1 10E9/L (ref 1.6–8.3)
NEUTROPHILS NFR BLD AUTO: 73.5 %
PLATELET # BLD AUTO: 101 10E9/L (ref 150–450)
PLATELET # BLD EST: ABNORMAL 10*3/UL
POLYCHROMASIA BLD QL SMEAR: SLIGHT
POTASSIUM SERPL-SCNC: 3.9 MMOL/L (ref 3.4–5.3)
PROT SERPL-MCNC: 6.8 G/DL (ref 6.8–8.8)
RBC # BLD AUTO: 4 10E12/L (ref 3.8–5.2)
SODIUM SERPL-SCNC: 140 MMOL/L (ref 133–144)
WBC # BLD AUTO: 23.2 10E9/L (ref 4–11)

## 2018-03-06 PROCEDURE — 25000128 H RX IP 250 OP 636: Mod: ZF | Performed by: INTERNAL MEDICINE

## 2018-03-06 PROCEDURE — 96413 CHEMO IV INFUSION 1 HR: CPT

## 2018-03-06 PROCEDURE — 96375 TX/PRO/DX INJ NEW DRUG ADDON: CPT

## 2018-03-06 PROCEDURE — 80053 COMPREHEN METABOLIC PANEL: CPT | Performed by: INTERNAL MEDICINE

## 2018-03-06 PROCEDURE — 99214 OFFICE O/P EST MOD 30 MIN: CPT | Mod: ZP | Performed by: PHYSICIAN ASSISTANT

## 2018-03-06 PROCEDURE — 25000128 H RX IP 250 OP 636: Mod: ZF | Performed by: PHYSICIAN ASSISTANT

## 2018-03-06 PROCEDURE — 96368 THER/DIAG CONCURRENT INF: CPT

## 2018-03-06 PROCEDURE — 85025 COMPLETE CBC W/AUTO DIFF WBC: CPT | Performed by: INTERNAL MEDICINE

## 2018-03-06 PROCEDURE — G0498 CHEMO EXTEND IV INFUS W/PUMP: HCPCS

## 2018-03-06 PROCEDURE — 96415 CHEMO IV INFUSION ADDL HR: CPT

## 2018-03-06 PROCEDURE — 96417 CHEMO IV INFUS EACH ADDL SEQ: CPT

## 2018-03-06 PROCEDURE — 96411 CHEMO IV PUSH ADDL DRUG: CPT

## 2018-03-06 RX ORDER — PALONOSETRON 0.05 MG/ML
0.25 INJECTION, SOLUTION INTRAVENOUS ONCE
Status: COMPLETED | OUTPATIENT
Start: 2018-03-06 | End: 2018-03-06

## 2018-03-06 RX ORDER — HEPARIN SODIUM (PORCINE) LOCK FLUSH IV SOLN 100 UNIT/ML 100 UNIT/ML
5 SOLUTION INTRAVENOUS ONCE
Status: COMPLETED | OUTPATIENT
Start: 2018-03-06 | End: 2018-03-06

## 2018-03-06 RX ORDER — FLUOROURACIL 50 MG/ML
400 INJECTION, SOLUTION INTRAVENOUS ONCE
Status: COMPLETED | OUTPATIENT
Start: 2018-03-06 | End: 2018-03-06

## 2018-03-06 RX ADMIN — LEUCOVORIN CALCIUM 750 MG: 50 INJECTION, POWDER, LYOPHILIZED, FOR SOLUTION INTRAMUSCULAR; INTRAVENOUS at 11:50

## 2018-03-06 RX ADMIN — PALONOSETRON HYDROCHLORIDE 0.25 MG: 0.25 INJECTION INTRAVENOUS at 09:15

## 2018-03-06 RX ADMIN — FLUOROURACIL 750 MG: 50 INJECTION, SOLUTION INTRAVENOUS at 13:35

## 2018-03-06 RX ADMIN — DEXTROSE MONOHYDRATE 340 MG: 50 INJECTION, SOLUTION INTRAVENOUS at 11:51

## 2018-03-06 RX ADMIN — OXALIPLATIN 150 MG: 5 INJECTION INTRAVENOUS at 09:42

## 2018-03-06 RX ADMIN — ATROPINE SULFATE 0.4 MG: 0.4 INJECTION, SOLUTION INTRAMUSCULAR; INTRAVENOUS; SUBCUTANEOUS at 11:45

## 2018-03-06 RX ADMIN — DEXTROSE 250 ML: 5 SOLUTION INTRAVENOUS at 09:15

## 2018-03-06 RX ADMIN — DEXAMETHASONE SODIUM PHOSPHATE 12 MG: 10 INJECTION, SOLUTION INTRAMUSCULAR; INTRAVENOUS at 09:18

## 2018-03-06 RX ADMIN — SODIUM CHLORIDE, PRESERVATIVE FREE 5 ML: 5 INJECTION INTRAVENOUS at 07:02

## 2018-03-06 ASSESSMENT — PAIN SCALES - GENERAL: PAINLEVEL: NO PAIN (0)

## 2018-03-06 NOTE — NURSING NOTE
Labs drawn via port a cath.  Flushed with saline and heparin.  Covered with transparent dressing.  VS done.  Pt tolerated well.    Anai Hammer  RN

## 2018-03-06 NOTE — LETTER
3/6/2018      RE: Kitty Bangura  [address] [facility] [address]       Oncology/Hematology Visit Note  Mar 6, 2018    Reason for Visit: Follow up of resectable pancreatic cancer    History of Present Illness: Staging CT chest/abd/pelvis on 12/9/17 showed several small pulmonary nodules (largest 3mm), unchanged mass in the pancreatic tail, mildly prominent mesenteric nodes thought likely reactive, and small right hepatic lobe hypodensities. Abdominal MRI on 12/10/17 showed hepatic hemangiomas, no evidence of metastatic disease to the liver.   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     Kitty met with Dr Monk and discussed neoadjuvant chemotherapy with FOLFIRINOX, then surgery and then adjuvant chemotherapy. She was seen on 1/15/18 for cycle 1 and then hospitalized for pancreatitis on 1/20/18 where she was managed with IV fluds and pain control. GI took her to the OR to attempt pancreatic duct stent placement but unfortunately it was completely obstructed. They were able to place a pancreatic stent and perform an EUS cyst-gastrostomy with plans to repeat Xray a month following to ensure stent passage. She was d/c 1/27. Cycle 2 was given 2/5/18 (a week delayed due to hospitalization).     She went to ED on 2/11 with worsening abdominal pain and was worried about pancreatitis. W/u with CT and labs including lipase were negative. She had elevated WBC of 33K but had Neulasta on 2/8.     Cycle 3 was given on 2/20 with dose-reduction (10%) of oxaliplatin due to significant cold sensitivity and motor neuropathy.     I saw her last week on 3/1 with fever, not neutropenic. W/u was consistent with bronchitis. She was started on azithromycin. CXR, influenza swab, and BCs were negative.     Interval History:  Mrs. Bangura returns to clinic today with her . She is feeling much better. Her cough has improved  though it is now productive. She had fever the evening after she saw me of 102. Fever curve slowly trended down from there. No fever now for 3 days. Her energy is much better. She is drinking well. Eating normally again. Feels ready for chemo today.    No further issues with bowels. No nausea. Her cold sensitivity is barely noticeable now. She went to chiropractor for neuropathy symptoms and feels this helped. She also had less symptoms overall with the dose reduction. No other concerns.     Review of Systems:  Patient denies fevers, chills, night sweats, unexplained weight changes, headaches, dizziness, vision or hearing changes, new lumps or bumps, chest pain, shortness of breath, cough, abdominal pain, nausea, vomiting, changes to bowel or bladder, swelling of extremities, bleeding issues, or rash.    Current Outpatient Prescriptions   Medication Sig Dispense Refill     albuterol (PROAIR HFA/PROVENTIL HFA/VENTOLIN HFA) 108 (90 BASE) MCG/ACT Inhaler Inhale 2 puffs into the lungs every 6 hours as needed for shortness of breath / dyspnea or wheezing 1 Inhaler 0     dexamethasone (DECADRON) 4 MG tablet Take 2 tablets (8 mg) by mouth daily (with breakfast) 12 tablet 2     Fluorouracil (ADURCIL) 5 GM/100ML SOLN        ondansetron (ZOFRAN) 8 MG tablet Take 1 tablet (8 mg) by mouth every 8 hours as needed for nausea 30 tablet 0     LORazepam (ATIVAN) 0.5 MG tablet Take 1 tablet (0.5 mg) by mouth every 4 hours as needed (Anxiety, Nausea/Vomiting or Sleep) 30 tablet 2     prochlorperazine (COMPAZINE) 10 MG tablet Take 1 tablet (10 mg) by mouth every 6 hours as needed (Nausea/Vomiting) 30 tablet 2     amylase-lipase-protease (CREON) [address] UNITS CPEP per EC capsule Take 2-3 with meals / 1-2 with snacks, up to 15 per day. 450 capsule 6     HYDROmorphone (DILAUDID) 2 MG tablet Take 1 tablet (2 mg) by mouth every 4 hours as needed for pain maximum 4 tablet(s) per day (Patient not taking: Reported on 3/1/2018) 25 tablet 0  "    HYDROcodone-acetaminophen (NORCO) 5-325 MG per tablet   0     oxyCODONE IR (ROXICODONE) 5 MG tablet Take 1 tablet (5 mg) by mouth every 4 hours as needed for moderate to severe pain (Patient not taking: Reported on 1/31/2018) 30 tablet 0       Physical Examination:  BP 91/66  Pulse 69  Temp 96.9  F (36.1  C) (Tympanic)  Resp 16  Ht 1.651 m (5' 5\")  Wt 71.6 kg (157 lb 12.8 oz)  SpO2 98%  BMI 26.26 kg/m2  Wt Readings from Last 10 Encounters:   03/06/18 71.6 kg (157 lb 12.8 oz)   03/01/18 70.9 kg (156 lb 6.4 oz)   02/20/18 73.2 kg (161 lb 6.4 oz)   02/14/18 72.3 kg (159 lb 6.4 oz)   02/14/18 72.3 kg (159 lb 6.4 oz)   02/11/18 72.6 kg (160 lb)   02/05/18 72.9 kg (160 lb 11.2 oz)   01/31/18 73.2 kg (161 lb 4.8 oz)   01/26/18 76.2 kg (168 lb)   01/15/18 76.6 kg (168 lb 14.4 oz)     Constitutional: Well-appearing female in no acute distress.  Eyes: EOMI, PERRL. No scleral icterus.  ENT: Oral mucosa is moist without lesions or thrush.  Lymphatic: Neck is supple without cervical or supraclavicular lymphadenopathy.   Cardiovascular: Regular rate and rhythm. No murmurs, gallops, or rubs. No peripheral edema.  Respiratory: Clear to auscultation bilaterally. No wheezes or crackles. Clear with coughing.   Gastrointestinal: Bowel sounds present. Abdomen soft, mildly tender to palpation throughout. No palpable hepatosplenomegaly or masses.   Neurologic: Cranial nerves II through XII are grossly intact.  Skin: No rashes, petechiae, or bruising noted on exposed skin.    Laboratory Data:   3/6/2018 07:02   Sodium 140   Potassium 3.9   Chloride 106   Carbon Dioxide 26   Urea Nitrogen 14   Creatinine 0.61   GFR Estimate >90   GFR Estimate If Black >90   Calcium 8.9   Anion Gap 8   Albumin 3.2 (L)   Protein Total 6.8   Bilirubin Total 0.3   Alkaline Phosphatase 191 (H)   ALT 42   AST 42   Glucose 105 (H)   WBC 23.2 (H)   Hemoglobin 11.8   Hematocrit 36.7   Platelet Count 101 (L)   RBC Count 4.00   MCV 92   MCH 29.5   MCHC 32.2 "   RDW 17.1 (H)   Diff Method Manual Differential   % Neutrophils 73.5   % Lymphocytes 15.4   % Monocytes 5.1   % Eosinophils 0.0   % Basophils 0.0   % Metamyelocytes 4.3   % Myelocytes 1.7   Absolute Neutrophil 17.1 (H)   Absolute Lymphocytes 3.6   Absolute Monocytes 1.2   Absolute Eosinophils 0.0   Absolute Basophils 0.0   Absolute Metamyelocytes 1.0 (H)   Absolute Myelocytes 0.4 (H)   Anisocytosis Slight   Polychromasia Slight   Macrocytes Present   Platelet Estimate Confirming automa...       Assessment and Plan:  1. Pancreatic cancer  S/p 3 cycles of FOLFIRINOX, last given 2/20 with Neulasta support given neutropenia past day 14 with cycle 1. She had significant motor neuropathy and cold sensitivity, so oxaliplatin was dose-reduced by 10% with cycle 3. She tolerated cycle 3 better in that regard. Did have infection with bronchitis that has resolved with antibiotics. Proceed with cycle 4 today and restaging next week in [place]. Overall plan is to complete 4 cycles of FOLRIRNOX before re-imaging and discussing possible surgery.     2. Bone pain: Secondary to Neulasta. Start taking Tylenol 1000 mg TID scheduled. Continue daily claritin. Okay to take oxycodone or dilaudid prn when pain starts to escalate. Discussed stretching and walking as well as using heat.     3. Motor neuropathy/cold sensitivity: Dose reduced oxaliplatin as above. Avoid cold triggers and apply heat to help with cold symptoms. Stretching of jaw and hands for motor neuropathy.    4. GI: Continue Creon. Start fiber supplement to help bowel irregularity. Imodium prn for diarrhea and Senna or Miralax prn for constipation. Nausea has been well-controlled.     5. Mucositis: Salt and soda rinses BID to TID prn.     6. Bronchitis: Last week s/p azithromycin. Fevers resolved for 3+ days. Lung exam now normal.     Kellie Nobles PA-C  [facility]  [address] [place] [address] [phone]

## 2018-03-06 NOTE — PATIENT INSTRUCTIONS
Contact Numbers    [facility] Main Line: [phone]  [facility] Triage:  [phone]    Call triage with chills and/or temperature greater than or equal to 100.5, uncontrolled nausea/vomiting, diarrhea, constipation, dizziness, shortness of breath, chest pain, bleeding, unexplained bruising, or any new/concerning symptoms, questions/concerns.     If you are having any concerning symptoms or wish to speak to a provider before your next infusion visit, please call your care coordinator or triage to notify them so we can adequately serve you.       After Hours: [phone]    If after hours, weekends, or holidays, call main hospital  and ask for Oncology doctor on call.         March 2018 Sunday Monday Tuesday Wednesday Thursday Friday Saturday                       1     XR CHEST 2 VIEWS    3:15 PM   (15 min.)   UCXR1   [facility] Xray     [facility] DRAW    3:45 PM   (15 min.)    [facility] DRAW   [facility] Draw     [facility] RETURN    3:55 PM   (50 min.)   Kellie Nobles PA-C   [facility] ED ONC INFUSION 120    5:00 PM   (120 min.)   UC ONCOLOGY INFUSION   [facility] 2     3       4     5     6     [facility] DRAW    7:15 AM   (15 min.)    [facility] DRAW   [facility] Draw     [facility] RETURN    7:35 AM   (50 min.)   Kellie Nobles PA-C   [facility] ED ONC INFUSION 360    9:00 AM   (360 min.)   UC ONCOLOGY INFUSION   [facility] 7     8     9     10       11     12     13     LEVEL 0    9:00 AM   (30 min.)   ROOM 5 [facility] Cancer Infusion     CT CHEST/ABDOMEN/PELVIS W    9:45 AM   (30 min.)   [address] [place] [address] CT 14     15     16     17       18     19     [facility] DRAW    7:00 AM   (15 min.)   [facility] DRAW   [facility] Lab Draw     [facility] RETURN    7:30 AM   (30 min.)   Jovany Monk MD   [facility] ED ONC INFUSION 360     8:30 AM   (360 min.)    ONCOLOGY INFUSION   [facility] 20     21     22     23     24       25     26     UMP RETURN   12:45 PM   (30 min.)   Ja Byrd MD   [facility] 27     28     29     30     31 April 2018 Sunday Monday Tuesday Wednesday Thursday Friday Saturday   1     2     LAB WITH [facility] CL[facility]IC   11:00 AM   (15 min.)    LAB    Health Lab     CT ABDOMEN PELVIS WWO   11:25 AM   (20 min.)   UCCT2   [facility] CT     [facility] RETURN    1:15 PM   (30 min.)   Ja Byrd MD   [facility]     PAC EVAL    2:15 PM   (60 min.)   2,  Pac Mara   [facility] Preoperative Assessment Center     PAC RN ASSESSMENT    3:15 PM   (30 min.)   Rn, [facility] Preoperative Assessment [facility]     PAC ANESTHESIA CONSULT    3:45 PM   (10 min.)   Anesthesiologist, [facility] 3     4     5     6     7       8     9     10     11     12     13     14       15     16     17     LAPAROSCOPY DIAGNOSTIC (GENERAL)    8:00 AM   Ja Byrd MD   [facility] OR 18     19     20     21       22     23     24     25     26     27     28       29     30                                          Recent Results (from the past 24 hour(s))   CBC with platelets differential    Collection Time: 03/06/18  7:02 AM   Result Value Ref Range    WBC 23.2 (H) 4.0 - 11.0 10e9/L    RBC Count 4.00 3.8 - 5.2 10e12/L    Hemoglobin 11.8 11.7 - 15.7 g/dL    Hematocrit 36.7 35.0 - 47.0 %    MCV 92 78 - 100 fl    MCH 29.5 26.5 - 33.0 pg    MCHC 32.2 31.5 - 36.5 g/dL    RDW 17.1 (H) 10.0 - 15.0 %    Platelet Count 101 (L) 150 - 450 10e9/L    Diff Method Manual Differential     % Neutrophils 73.5 %    % Lymphocytes 15.4 %    % Monocytes 5.1 %    % Eosinophils 0.0 %    % Basophils 0.0 %    % Metamyelocytes 4.3 %    % Myelocytes 1.7 %    Absolute Neutrophil 17.1 (H) 1.6 - 8.3 10e9/L    Absolute Lymphocytes 3.6 0.8 - 5.3 10e9/L    Absolute  Monocytes 1.2 0.0 - 1.3 10e9/L    Absolute Eosinophils 0.0 0.0 - 0.7 10e9/L    Absolute Basophils 0.0 0.0 - 0.2 10e9/L    Absolute Metamyelocytes 1.0 (H) 0 10e9/L    Absolute Myelocytes 0.4 (H) 0 10e9/L    Anisocytosis Slight     Polychromasia Slight     Macrocytes Present     Platelet Estimate Confirming automated cell count    Comprehensive metabolic panel    Collection Time: 03/06/18  7:02 AM   Result Value Ref Range    Sodium 140 133 - 144 mmol/L    Potassium 3.9 3.4 - 5.3 mmol/L    Chloride 106 94 - 109 mmol/L    Carbon Dioxide 26 20 - 32 mmol/L    Anion Gap 8 3 - 14 mmol/L    Glucose 105 (H) 70 - 99 mg/dL    Urea Nitrogen 14 7 - 30 mg/dL    Creatinine 0.61 0.52 - 1.04 mg/dL    GFR Estimate >90 >60 mL/min/1.7m2    GFR Estimate If Black >90 >60 mL/min/1.7m2    Calcium 8.9 8.5 - 10.1 mg/dL    Bilirubin Total 0.3 0.2 - 1.3 mg/dL    Albumin 3.2 (L) 3.4 - 5.0 g/dL    Protein Total 6.8 6.8 - 8.8 g/dL    Alkaline Phosphatase 191 (H) 40 - 150 U/L    ALT 42 0 - 50 U/L    AST 42 0 - 45 U/L

## 2018-03-06 NOTE — MR AVS SNAPSHOT
After Visit Summary   3/6/2018    Kitty Bangura    MRN: [id number]           Patient Information     Date Of Birth          1960        Visit Information        Provider Department      3/6/2018 9:00 AM UC 19 ATC;  ONCOLOGY INFUSION [facility]        Today's Diagnoses     Pancreatic adenocarcinoma (H)    -  1      Care Instructions    Contact Numbers    [facility] Main Line: [phone]  [facility] Triage:  [phone]    Call triage with chills and/or temperature greater than or equal to 100.5, uncontrolled nausea/vomiting, diarrhea, constipation, dizziness, shortness of breath, chest pain, bleeding, unexplained bruising, or any new/concerning symptoms, questions/concerns.     If you are having any concerning symptoms or wish to speak to a provider before your next infusion visit, please call your care coordinator or triage to notify them so we can adequately serve you.       After Hours: [phone]    If after hours, weekends, or holidays, call main hospital  and ask for Oncology doctor on call.         March 2018 Sunday Monday Tuesday Wednesday Thursday Friday Saturday                       1     XR CHEST 2 VIEWS    3:15 PM   (15 min.)   UCXR1   [facility] Xray     [facility] LAB DRAW    3:45 PM   (15 min.)    [facility] LAB DRAW   [facility] Lab Draw     [facility] RETURN    3:55 PM   (50 min.)   Kellie Nobles PA-C   [facility] 120    5:00 PM   (120 min.)    ONCOLOGY INFUSION   [facility] 2     3       4     5     6     [facility] DRAW    7:15 AM   (15 min.)    [facility] LAB DRAW   [facility] Draw     [facility] RETURN    7:35 AM   (50 min.)   Kellie Nobles PA-C   [facility] 360    9:00 AM   (360 min.)    ONCOLOGY INFUSION   [facility] 7     8     9     10       11     12     13     LEVEL 0    9:00 AM   (30  min.)   ROOM 5 [facility] Cancer Infusion     CT CHEST/ABDOMEN/PELVIS W    9:45 AM   (30 min.)   WYCT1   [facility] CT 14     15     16     17       18     19     [facility] DRAW    7:00 AM   (15 min.)    [facility] LAB DRAW   [facility] Lab Draw     UMP RETURN    7:30 AM   (30 min.)   Jovany Monk MD   [facility]     UMP ONC INFUSION 360    8:30 AM   (360 min.)   UC ONCOLOGY INFUSION   [facility] 20     21     22     23     24       25     26     [facility] RETURN   12:45 PM   (30 min.)   Ja Byrd MD   [facility] 27     28     29     30     31 April 2018 Sunday Monday Tuesday Wednesday Thursday Friday Saturday   1     2     LAB WITH  CLINIC   11:00 AM   (15 min.)    LAB   [facility] Lab     CT ABDOMEN PELVIS WWO   11:25 AM   (20 min.)   UCCT2   [facility] Imaging [place] CT     [facility] RETURN    1:15 PM   (30 min.)   Ja Byrd MD   [facility]     PAC EVAL    2:15 PM   (60 min.)   2,  Pac Mara   [facility] Preoperative Assessment [place]     PAC RN ASSESSMENT    3:15 PM   (30 min.)   Rn, [facility] Preoperative Assessment Center     PAC ANESTHESIA CONSULT    3:45 PM   (10 min.)   Anesthesiologist, [facility] Preoperative Assessment [place] 3     4     5     6     7       8     9     10     11     12     13     14       15     16     17     LAPAROSCOPY DIAGNOSTIC (GENERAL)    8:00 AM   Ja Byrd MD   [facility] OR 18     19     20     21       22     23     24     25     26     27     28       29     30                                          Recent Results (from the past 24 hour(s))   CBC with platelets differential    Collection Time: 03/06/18  7:02 AM   Result Value Ref Range    WBC 23.2 (H) 4.0 - 11.0 10e9/L    RBC Count 4.00 3.8 - 5.2 10e12/L    Hemoglobin 11.8 11.7 - 15.7 g/dL    Hematocrit 36.7 35.0 - 47.0 %    MCV 92 78 - 100 fl    MCH 29.5 26.5 - 33.0 pg    MCHC 32.2 31.5 - 36.5 g/dL     RDW 17.1 (H) 10.0 - 15.0 %    Platelet Count 101 (L) 150 - 450 10e9/L    Diff Method Manual Differential     % Neutrophils 73.5 %    % Lymphocytes 15.4 %    % Monocytes 5.1 %    % Eosinophils 0.0 %    % Basophils 0.0 %    % Metamyelocytes 4.3 %    % Myelocytes 1.7 %    Absolute Neutrophil 17.1 (H) 1.6 - 8.3 10e9/L    Absolute Lymphocytes 3.6 0.8 - 5.3 10e9/L    Absolute Monocytes 1.2 0.0 - 1.3 10e9/L    Absolute Eosinophils 0.0 0.0 - 0.7 10e9/L    Absolute Basophils 0.0 0.0 - 0.2 10e9/L    Absolute Metamyelocytes 1.0 (H) 0 10e9/L    Absolute Myelocytes 0.4 (H) 0 10e9/L    Anisocytosis Slight     Polychromasia Slight     Macrocytes Present     Platelet Estimate Confirming automated cell count    Comprehensive metabolic panel    Collection Time: 03/06/18  7:02 AM   Result Value Ref Range    Sodium 140 133 - 144 mmol/L    Potassium 3.9 3.4 - 5.3 mmol/L    Chloride 106 94 - 109 mmol/L    Carbon Dioxide 26 20 - 32 mmol/L    Anion Gap 8 3 - 14 mmol/L    Glucose 105 (H) 70 - 99 mg/dL    Urea Nitrogen 14 7 - 30 mg/dL    Creatinine 0.61 0.52 - 1.04 mg/dL    GFR Estimate >90 >60 mL/min/1.7m2    GFR Estimate If Black >90 >60 mL/min/1.7m2    Calcium 8.9 8.5 - 10.1 mg/dL    Bilirubin Total 0.3 0.2 - 1.3 mg/dL    Albumin 3.2 (L) 3.4 - 5.0 g/dL    Protein Total 6.8 6.8 - 8.8 g/dL    Alkaline Phosphatase 191 (H) 40 - 150 U/L    ALT 42 0 - 50 U/L    AST 42 0 - 45 U/L                 Follow-ups after your visit        Your next 10 appointments already scheduled     Mar 08, 2018 11:30 AM CST   Level O with ROOM 7 [facility] Cancer Infusion ([facility])    [facility]  [address]  [place] [address]   [phone]            Mar 13, 2018  9:00 AM CDT   Level O with ROOM 5 [facility] Cancer Infusion ([facility])    [facility]  [address]  [facility] [address]   [phone]            Mar 13, 2018 10:00 AM CDT   (Arrive by 9:45 AM)    CT CHEST/ABDOMEN/PELVIS W CONTRAST with WYCT1   [facility] CT ([facility])    [address] [address] [phone]           Please bring any scans or X-rays taken at other hospitals, if similar tests were done. Also bring a list of your medicines, including vitamins, minerals and over-the-counter drugs. It is safest to leave personal items at home.  Be sure to tell your doctor:   If you have any allergies.   If there s any chance you are pregnant.   If you are breastfeeding.  You may have contrast for this exam. To prepare:   Do not eat or drink for 2 hours before your exam. If you need to take medicine, you may take it with small sips of water. (We may ask you to take liquid medicine as well.)   The day before your exam, drink extra fluids at least six 8-ounce glasses (unless your doctor tells you to restrict your fluids).   You will be given instructions on how to drink the contrast.  Patients over 70 or patients with diabetes or kidney problems:   If you haven t had a blood test (creatinine test) within the last 30 days, the Cardiologist/Radiologist may require you to get this test prior to your exam.  If you have diabetes:   Continue to take your metformin medication on the day of your exam  Please wear loose clothing, such as a sweat suit or jogging clothes. Avoid snaps, zippers and other metal. We may ask you to undress and put on a hospital gown.  If you have any questions, please call the Imaging Department where you will have your exam.            Mar 19, 2018  7:00 AM CDT   [employer] Lab Draw with  [email or web address] LAB DRAW   [facility] Lab Draw ([facility])    [address] [place] [address]  [address] [place] [address] [phone]            Mar 19, 2018  7:45 AM CDT   (Arrive by 7:30 AM)   Return Visit with Jovany Monk MD   [facility] Clinic ([facility])    [address] [place] [address]  [address]  [facility] [address]   [phone]            Mar 19, 2018  8:30 AM CDT   Infusion 360 with UC ONCOLOGY INFUSION, UC 18 ATC   [facility] Cancer [facility] ([facility])    [address] [place] [address]  [address]  [facility] [address]   [phone]            Mar 26, 2018  1:00 PM CDT   (Arrive by 12:45 PM)   Return Visit with Ja Byrd MD   [facility] Cancer [facility] ([facility])    [address] [place] [address]  [address]  [facility] [address]   [phone]            Apr 02, 2018  2:30 PM CDT   (Arrive by 2:15 PM)   PAC EVALUATION with Uc Pac Mara 2   [facility] Preoperative Assessment [place] ([facility])    [address] [place] [address]  4th Floor  [facility] [address]   [phone]            Apr 02, 2018  3:30 PM CDT   (Arrive by 3:15 PM)   PAC RN ASSESSMENT with Ynes Pac Rn   [facility] Preoperative Assessment [place] ([facility])    [address] [place] [address]  4th Floor  [facility] [address]   [phone]              Who to contact     If you have questions or need follow up information about today's clinic visit or your schedule please contact [facility] directly at [phone].  Normal or non-critical lab and imaging results will be communicated to you by MyChart, letter or phone within 4 business days after the clinic has received the results. If you do not hear from us within 7 days, please contact the clinic through [employer] or phone. If you have a critical or abnormal lab result, we will notify you by phone as soon as possible.  Submit refill requests through [employer] or call your pharmacy and they will forward the refill request to us. Please allow 3 business days for your refill to be completed.          Additional Information About Your Visit        [employer] Information     [employer] gives you secure access to your electronic health record. If you see a primary care provider, you  can also send messages to your care team and make appointments. If you have questions, please call your primary care clinic.  If you do not have a primary care provider, please call [phone] and they will assist you.        Care EveryWhere ID     This is your Care EveryWhere ID. This could be used by other organizations to access your [place] medical records  [id number]         Blood Pressure from Last 3 Encounters:   03/06/18 91/66   03/01/18 93/63   02/22/18 110/69    Weight from Last 3 Encounters:   03/06/18 71.6 kg (157 lb 12.8 oz)   03/01/18 70.9 kg (156 lb 6.4 oz)   02/20/18 73.2 kg (161 lb 6.4 oz)              We Performed the Following     CBC with platelets differential     Comprehensive metabolic panel        Primary Care Provider Office Phone # Fax #    Solange Mcgill [phone] [address] [address]        Equal Access to Services     CHASE DE GUZMAN : Hadii aad ku hadasho Soomaali, waaxda luqadaha, qaybta kaalmada adeegyada, waxay ridge hayge gerardo . So [facility] [phone].    ATENCIÓN: Si habla español, tiene a quiros disposición servicios gratuitos de asistencia lingüística. [facility] [phone].    We comply with applicable federal civil rights laws and [place] laws. We do not discriminate on the basis of race, color, national origin, age, disability, sex, sexual orientation, or gender identity.            Thank you!     Thank you for choosing [facility] CANCER CLINIC  for your care. Our goal is always to provide you with excellent care. Hearing back from our patients is one way we can continue to improve our services. Please take a few minutes to complete the written survey that you may receive in the mail after your visit with us. Thank you!             Your Updated Medication List - Protect others around you: Learn how to safely use, store and throw away your medicines at www.disposemymeds.org.          This list is accurate  as of 3/6/18  4:12 PM.  Always use your most recent med list.                   Brand Name Dispense Instructions for use Diagnosis    albuterol 108 (90 BASE) MCG/ACT Inhaler    PROAIR HFA/PROVENTIL HFA/VENTOLIN HFA    1 Inhaler    Inhale 2 puffs into the lungs every 6 hours as needed for shortness of breath / dyspnea or wheezing    Acute bronchitis, unspecified organism       amylase-lipase-protease [address]96-[address] UNITS Cpep per EC capsule    CREON    450 capsule    Take 2-3 with meals / 1-2 with snacks, up to 15 per day.    Necrotizing pancreatitis       dexamethasone 4 MG tablet    DECADRON    12 tablet    Take 2 tablets (8 mg) by mouth daily (with breakfast)    Pancreatic adenocarcinoma (H)       Fluorouracil 5 GM/100ML Soln    ADURCIL          HYDROcodone-acetaminophen 5-325 MG per tablet    NORCO          HYDROmorphone 2 MG tablet    DILAUDID    25 tablet    Take 1 tablet (2 mg) by mouth every 4 hours as needed for pain maximum 4 tablet(s) per day        LORazepam 0.5 MG tablet    ATIVAN    30 tablet    Take 1 tablet (0.5 mg) by mouth every 4 hours as needed (Anxiety, Nausea/Vomiting or Sleep)    Pancreatic adenocarcinoma (H)       ondansetron 8 MG tablet    ZOFRAN    30 tablet    Take 1 tablet (8 mg) by mouth every 8 hours as needed for nausea    Pancreatic adenocarcinoma (H)       oxyCODONE IR 5 MG tablet    ROXICODONE    30 tablet    Take 1 tablet (5 mg) by mouth every 4 hours as needed for moderate to severe pain    Necrotizing pancreatitis, Pancreatic adenocarcinoma (H)       prochlorperazine 10 MG tablet    COMPAZINE    30 tablet    Take 1 tablet (10 mg) by mouth every 6 hours as needed (Nausea/Vomiting)    Pancreatic adenocarcinoma (H)

## 2018-03-06 NOTE — PROGRESS NOTES
Oncology/Hematology Visit Note  Mar 6, 2018    Reason for Visit: Follow up of resectable pancreatic cancer    History of Present Illness: Staging CT chest/abd/pelvis on 12/9/17 showed several small pulmonary nodules (largest 3mm), unchanged mass in the pancreatic tail, mildly prominent mesenteric nodes thought likely reactive, and small right hepatic lobe hypodensities. Abdominal MRI on 12/10/17 showed hepatic hemangiomas, no evidence of metastatic disease to the liver.   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     Kitty met with Dr Monk and discussed neoadjuvant chemotherapy with FOLFIRINOX, then surgery and then adjuvant chemotherapy. She was seen on 1/15/18 for cycle 1 and then hospitalized for pancreatitis on 1/20/18 where she was managed with IV fluds and pain control. GI took her to the OR to attempt pancreatic duct stent placement but unfortunately it was completely obstructed. They were able to place a pancreatic stent and perform an EUS cyst-gastrostomy with plans to repeat Xray a month following to ensure stent passage. She was d/c 1/27. Cycle 2 was given 2/5/18 (a week delayed due to hospitalization).     She went to ED on 2/11 with worsening abdominal pain and was worried about pancreatitis. W/u with CT and labs including lipase were negative. She had elevated WBC of 33K but had Neulasta on 2/8.     Cycle 3 was given on 2/20 with dose-reduction (10%) of oxaliplatin due to significant cold sensitivity and motor neuropathy.     I saw her last week on 3/1 with fever, not neutropenic. W/u was consistent with bronchitis. She was started on azithromycin. CXR, influenza swab, and BCs were negative.     Interval History:  Mrs. Bangura returns to clinic today with her . She is feeling much better. Her cough has improved though it is now productive. She had fever the evening after she saw me of 102. Fever  curve slowly trended down from there. No fever now for 3 days. Her energy is much better. She is drinking well. Eating normally again. Feels ready for chemo today.    No further issues with bowels. No nausea. Her cold sensitivity is barely noticeable now. She went to chiropractor for neuropathy symptoms and feels this helped. She also had less symptoms overall with the dose reduction. No other concerns.     Review of Systems:  Patient denies fevers, chills, night sweats, unexplained weight changes, headaches, dizziness, vision or hearing changes, new lumps or bumps, chest pain, shortness of breath, cough, abdominal pain, nausea, vomiting, changes to bowel or bladder, swelling of extremities, bleeding issues, or rash.    Current Outpatient Prescriptions   Medication Sig Dispense Refill     albuterol (PROAIR HFA/PROVENTIL HFA/VENTOLIN HFA) 108 (90 BASE) MCG/ACT Inhaler Inhale 2 puffs into the lungs every 6 hours as needed for shortness of breath / dyspnea or wheezing 1 Inhaler 0     dexamethasone (DECADRON) 4 MG tablet Take 2 tablets (8 mg) by mouth daily (with breakfast) 12 tablet 2     Fluorouracil (ADURCIL) 5 GM/100ML SOLN        ondansetron (ZOFRAN) 8 MG tablet Take 1 tablet (8 mg) by mouth every 8 hours as needed for nausea 30 tablet 0     LORazepam (ATIVAN) 0.5 MG tablet Take 1 tablet (0.5 mg) by mouth every 4 hours as needed (Anxiety, Nausea/Vomiting or Sleep) 30 tablet 2     prochlorperazine (COMPAZINE) 10 MG tablet Take 1 tablet (10 mg) by mouth every 6 hours as needed (Nausea/Vomiting) 30 tablet 2     amylase-lipase-protease (CREON) 57918-92873 UNITS CPEP per EC capsule Take 2-3 with meals / 1-2 with snacks, up to 15 per day. 450 capsule 6     HYDROmorphone (DILAUDID) 2 MG tablet Take 1 tablet (2 mg) by mouth every 4 hours as needed for pain maximum 4 tablet(s) per day (Patient not taking: Reported on 3/1/2018) 25 tablet 0     HYDROcodone-acetaminophen (NORCO) 5-325 MG per tablet   0     oxyCODONE IR  "(ROXICODONE) 5 MG tablet Take 1 tablet (5 mg) by mouth every 4 hours as needed for moderate to severe pain (Patient not taking: Reported on 1/31/2018) 30 tablet 0       Physical Examination:  BP 91/66  Pulse 69  Temp 96.9  F (36.1  C) (Tympanic)  Resp 16  Ht 1.651 m (5' 5\")  Wt 71.6 kg (157 lb 12.8 oz)  SpO2 98%  BMI 26.26 kg/m2  Wt Readings from Last 10 Encounters:   03/06/18 71.6 kg (157 lb 12.8 oz)   03/01/18 70.9 kg (156 lb 6.4 oz)   02/20/18 73.2 kg (161 lb 6.4 oz)   02/14/18 72.3 kg (159 lb 6.4 oz)   02/14/18 72.3 kg (159 lb 6.4 oz)   02/11/18 72.6 kg (160 lb)   02/05/18 72.9 kg (160 lb 11.2 oz)   01/31/18 73.2 kg (161 lb 4.8 oz)   01/26/18 76.2 kg (168 lb)   01/15/18 76.6 kg (168 lb 14.4 oz)     Constitutional: Well-appearing female in no acute distress.  Eyes: EOMI, PERRL. No scleral icterus.  ENT: Oral mucosa is moist without lesions or thrush.  Lymphatic: Neck is supple without cervical or supraclavicular lymphadenopathy.   Cardiovascular: Regular rate and rhythm. No murmurs, gallops, or rubs. No peripheral edema.  Respiratory: Clear to auscultation bilaterally. No wheezes or crackles. Clear with coughing.   Gastrointestinal: Bowel sounds present. Abdomen soft, mildly tender to palpation throughout. No palpable hepatosplenomegaly or masses.   Neurologic: Cranial nerves II through XII are grossly intact.  Skin: No rashes, petechiae, or bruising noted on exposed skin.    Laboratory Data:   3/6/2018 07:02   Sodium 140   Potassium 3.9   Chloride 106   Carbon Dioxide 26   Urea Nitrogen 14   Creatinine 0.61   GFR Estimate >90   GFR Estimate If Black >90   Calcium 8.9   Anion Gap 8   Albumin 3.2 (L)   Protein Total 6.8   Bilirubin Total 0.3   Alkaline Phosphatase 191 (H)   ALT 42   AST 42   Glucose 105 (H)   WBC 23.2 (H)   Hemoglobin 11.8   Hematocrit 36.7   Platelet Count 101 (L)   RBC Count 4.00   MCV 92   MCH 29.5   MCHC 32.2   RDW 17.1 (H)   Diff Method Manual Differential   % Neutrophils 73.5   % " Lymphocytes 15.4   % Monocytes 5.1   % Eosinophils 0.0   % Basophils 0.0   % Metamyelocytes 4.3   % Myelocytes 1.7   Absolute Neutrophil 17.1 (H)   Absolute Lymphocytes 3.6   Absolute Monocytes 1.2   Absolute Eosinophils 0.0   Absolute Basophils 0.0   Absolute Metamyelocytes 1.0 (H)   Absolute Myelocytes 0.4 (H)   Anisocytosis Slight   Polychromasia Slight   Macrocytes Present   Platelet Estimate Confirming automa...       Assessment and Plan:  1. Pancreatic cancer  S/p 3 cycles of FOLFIRINOX, last given 2/20 with Neulasta support given neutropenia past day 14 with cycle 1. She had significant motor neuropathy and cold sensitivity, so oxaliplatin was dose-reduced by 10% with cycle 3. She tolerated cycle 3 better in that regard. Did have infection with bronchitis that has resolved with antibiotics. Proceed with cycle 4 today and restaging next week in [place]. Overall plan is to complete 4 cycles of FOLRIRNOX before re-imaging and discussing possible surgery.     2. Bone pain: Secondary to Neulasta. Start taking Tylenol 1000 mg TID scheduled. Continue daily claritin. Okay to take oxycodone or dilaudid prn when pain starts to escalate. Discussed stretching and walking as well as using heat.     3. Motor neuropathy/cold sensitivity: Dose reduced oxaliplatin as above. Avoid cold triggers and apply heat to help with cold symptoms. Stretching of jaw and hands for motor neuropathy.    4. GI: Continue Creon. Start fiber supplement to help bowel irregularity. Imodium prn for diarrhea and Senna or Miralax prn for constipation. Nausea has been well-controlled.     5. Mucositis: Salt and soda rinses BID to TID prn.     6. Bronchitis: Last week s/p azithromycin. Fevers resolved for 3+ days. Lung exam now normal.     Kellie Nobles PA-C  [facility]  [address] [id number] [phone]

## 2018-03-06 NOTE — NURSING NOTE
"Oncology Rooming Note    March 6, 2018 7:16 AM   Kitty Bangura is a 57 year old female who presents for:    Chief Complaint   Patient presents with     Port Draw     Port labs     RECHECK     Return: Pancreatic CA     Initial Vitals: BP 91/66  Pulse 69  Temp 96.9  F (36.1  C) (Tympanic)  Resp 16  Ht 1.651 m (5' 5\")  Wt 71.6 kg (157 lb 12.8 oz)  SpO2 98%  BMI 26.26 kg/m2 Estimated body mass index is 26.26 kg/(m^2) as calculated from the following:    Height as of this encounter: 1.651 m (5' 5\").    Weight as of this encounter: 71.6 kg (157 lb 12.8 oz). Body surface area is 1.81 meters squared.  No Pain (0) Comment: Data Unavailable   No LMP recorded. Patient is postmenopausal.  Allergies reviewed: Yes  Medications reviewed: Yes    Medications: MEDICATION REFILLS NEEDED TODAY. Provider was notified.  Pharmacy name entered into [facility]:    [id number] - [place], [place] - [address] [place] [address] [id number] - [place], [place] - [address] AT [facility] OF [address] [place] [address]    Clinical concerns: Pt requesting refills on Ativan and Compazine. Giuliano  was notified.    5 minutes for nursing intake (face to face time)     Marleen Alvarez CMA              "

## 2018-03-06 NOTE — PROGRESS NOTES
Infusion Nursing Note:  Kitty Bangura presents today for Cycle 4 Day 1 Oxaliplatin, Irinotecan, Leucovorin, Fluorouracil bolus, Fluorouracil pump connection.    Patient seen by provider today: Yes: DANTE Talavera    Note: Patient states her cold sensitivity was less after last infusion due to dose reduction. Verbalized understanding of cold sensitivity precautions. States she has dexamethasone to take at home. No other concerns at this time.     Intravenous Access:  Implanted Port.    Treatment Conditions:  Lab Results   Component Value Date    HGB 11.8 03/06/2018     Lab Results   Component Value Date    WBC 23.2 03/06/2018      Lab Results   Component Value Date    ANEU 17.1 03/06/2018     Lab Results   Component Value Date     03/06/2018      Lab Results   Component Value Date     03/06/2018                   Lab Results   Component Value Date    POTASSIUM 3.9 03/06/2018           Lab Results   Component Value Date    MAG 1.9 01/27/2018            Lab Results   Component Value Date    CR 0.61 03/06/2018                   Lab Results   Component Value Date    ILIANA 8.9 03/06/2018                Lab Results   Component Value Date    BILITOTAL 0.3 03/06/2018           Lab Results   Component Value Date    ALBUMIN 3.2 03/06/2018                    Lab Results   Component Value Date    ALT 42 03/06/2018           Lab Results   Component Value Date    AST 42 03/06/2018     Results reviewed, labs MET treatment parameters, ok to proceed with treatment.      Post Infusion Assessment:  Patient tolerated infusion without incident.  Blood return noted pre and post infusion.  Blood return noted during Fluorouracil bolus administration every 2 cc.  Site patent and intact, free from redness, edema or discomfort.  No evidence of extravasations.    Prior to discharge: Port is secured in place with tegaderm and flushed with 10cc NS with positive blood return noted.  Continuous home infusion Dosi-Fuser pump  "connected.    All connectors secured in place and clamps taped open. Pump and connections double checked with Indu Fairchild RN.  Pump started, \"running\" noted on display (CADD): Not Applicable.  Patient instructed to call our clinic or [facility] with any questions or concerns at home.  Patient verbalized understanding.    Patient set up for pump disconnect and Neulasta Onpro at [facility] on 3/8/18 at 1130.        Discharge Plan:   Prescription refills given for Ativan, Compazine.  Discharge instructions reviewed with: Patient.  Patient verbalized understanding of discharge instructions and all questions answered.  Copy of AVS reviewed with patient.  Patient will return 3/19/18 for next infusion appointment.  Patient discharged in stable condition accompanied by: .  Face to Face time: 0.    FUNMILAYO RAMON RN          "

## 2018-03-08 ENCOUNTER — INFUSION THERAPY VISIT (OUTPATIENT)
Dept: INFUSION THERAPY | Facility: CLINIC | Age: 58
End: 2018-03-08
Attending: INTERNAL MEDICINE
Payer: COMMERCIAL

## 2018-03-08 VITALS — DIASTOLIC BLOOD PRESSURE: 66 MMHG | TEMPERATURE: 97 F | SYSTOLIC BLOOD PRESSURE: 99 MMHG | HEART RATE: 67 BPM

## 2018-03-08 DIAGNOSIS — C25.9 PANCREATIC ADENOCARCINOMA (H): Primary | ICD-10-CM

## 2018-03-08 PROCEDURE — 96377 APPLICATON ON-BODY INJECTOR: CPT

## 2018-03-08 PROCEDURE — 25000128 H RX IP 250 OP 636: Performed by: INTERNAL MEDICINE

## 2018-03-08 RX ORDER — HEPARIN SODIUM (PORCINE) LOCK FLUSH IV SOLN 100 UNIT/ML 100 UNIT/ML
5 SOLUTION INTRAVENOUS
Status: DISCONTINUED | OUTPATIENT
Start: 2018-03-08 | End: 2018-03-08 | Stop reason: HOSPADM

## 2018-03-08 RX ADMIN — PEGFILGRASTIM 6 MG: KIT SUBCUTANEOUS at 11:45

## 2018-03-08 RX ADMIN — SODIUM CHLORIDE, PRESERVATIVE FREE 5 ML: 5 INJECTION INTRAVENOUS at 11:46

## 2018-03-08 NOTE — MR AVS SNAPSHOT
After Visit Summary   3/8/2018    Kitty Bangura    MRN: [id number]           Patient Information     Date Of Birth          1960        Visit Information        Provider Department      3/8/2018 11:30 AM ROOM 7 [facility]        Today's Diagnoses     Pancreatic adenocarcinoma (H)    -  1       Follow-ups after your visit        Your next 10 appointments already scheduled     Mar 13, 2018  9:00 AM CDT   Level O with ROOM 5 [facility] ([facility])    [facility]  [address]  [facility] [address]   [phone]            Mar 13, 2018 10:00 AM CDT   (Arrive by 9:45 AM)   CT CHEST/ABDOMEN/PELVIS W CONTRAST with [facility]   [facility] CT ([facility])    [address]  [facility] [address]   [phone]           Please bring any scans or X-rays taken at other hospitals, if similar tests were done. Also bring a list of your medicines, including vitamins, minerals and over-the-counter drugs. It is safest to leave personal items at home.  Be sure to tell your doctor:   If you have any allergies.   If there s any chance you are pregnant.   If you are breastfeeding.  You may have contrast for this exam. To prepare:   Do not eat or drink for 2 hours before your exam. If you need to take medicine, you may take it with small sips of water. (We may ask you to take liquid medicine as well.)   The day before your exam, drink extra fluids at least six 8-ounce glasses (unless your doctor tells you to restrict your fluids).   You will be given instructions on how to drink the contrast.  Patients over 70 or patients with diabetes or kidney problems:   If you haven t had a blood test (creatinine test) within the last 30 days, the Cardiologist/Radiologist may require you to get this test prior to your exam.  If you have diabetes:   Continue to take your metformin medication on the day of your exam  Please  wear loose clothing, such as a sweat suit or jogging clothes. Avoid snaps, zippers and other metal. We may ask you to undress and put on a hospital gown.  If you have any questions, please call the Imaging Department where you will have your exam.            Mar 19, 2018  7:00 AM CDT   [facility] Lab Draw with  [facility] LAB DRAW   [facility] ([facility])    [address] [place] [address]  [address]  [facility] [address]   [phone]            Mar 19, 2018  7:45 AM CDT   (Arrive by 7:30 AM)   Return Visit with Jovany Monk MD   [facility] ([facility])    [address] [place] [address]  [address]  [facility] [address]   [phone]            Mar 19, 2018  8:30 AM CDT   Infusion 360 with  ONCOLOGY INFUSION, UC 18 ATC   [facility] ([facility])    [address] [place] [address]  [address]  [facility] [address]   [phone]            Mar 26, 2018  1:00 PM CDT   (Arrive by 12:45 PM)   Return Visit with Ja Byrd MD   [facility] ([facility])    [address] [place] [address]  [address]  [facility] [address]   [phone]            Apr 02, 2018  1:30 PM CDT   (Arrive by 1:15 PM)   Return Visit with Ja Byrd MD   [facility] ([facility])    [address] [place] [address]  [address]  [facility] [address]   [phone]            Apr 02, 2018  2:30 PM CDT   (Arrive by 2:15 PM)   PAC EVALUATION with Uc Pac Mara 2   [facility] Preoperative Assessment [place] ([facility])    [address] [place] [address]  4th Floor  [facility] [address]   [phone]            Apr 02, 2018  3:30 PM CDT   (Arrive by 3:15 PM)   PAC RN ASSESSMENT with Uc Pac Rn   [facility] Preoperative Assessment [place] ([facility])    [address] [place] [address]  4th Floor  [facility] [address]   [phone]               Who to contact     If you have questions or need follow up information about today's clinic visit or your schedule please contact [facility] directly at [phone].  Normal or non-critical lab and imaging results will be communicated to you by MyChart, letter or phone within 4 business days after the clinic has received the results. If you do not hear from us within 7 days, please contact the clinic through [facility] or phone. If you have a critical or abnormal lab result, we will notify you by phone as soon as possible.  Submit refill requests through [facility] or call your pharmacy and they will forward the refill request to us. Please allow 3 business days for your refill to be completed.          Additional Information About Your Visit        [facility] Information     [facility] gives you secure access to your electronic health record. If you see a primary care provider, you can also send messages to your care team and make appointments. If you have questions, please call your primary care clinic.  If you do not have a primary care provider, please call [phone] and they will assist you.        Care EveryWhere ID     This is your Care EveryWhere ID. This could be used by other organizations to access your [place] medical records  [id number]        Your Vitals Were     Pulse Temperature                67 97  F (36.1  C) (Oral)           Blood Pressure from Last 3 Encounters:   03/08/18 99/66   03/06/18 91/66   03/01/18 93/63    Weight from Last 3 Encounters:   03/06/18 71.6 kg (157 lb 12.8 oz)   03/01/18 70.9 kg (156 lb 6.4 oz)   02/20/18 73.2 kg (161 lb 6.4 oz)              Today, you had the following     No orders found for display       Primary Care Provider Office Phone # Fax #    Solange Mcgill [phone] [address] [facility] [address]        Equal Access to Services     CHASE DE GUZMAN : solange Stern, larisa snider  re fergusoncheng minoraan ah. So [facility] [phone].    ATENCIÓN: Si angelicala pedro, tiene a quiros disposición servicios gratuitos de asistencia lingüística. Macy al [phone].    We comply with applicable federal civil rights laws and [place] laws. We do not discriminate on the basis of race, color, national origin, age, disability, sex, sexual orientation, or gender identity.            Thank you!     Thank you for choosing [facility]  for your care. Our goal is always to provide you with excellent care. Hearing back from our patients is one way we can continue to improve our services. Please take a few minutes to complete the written survey that you may receive in the mail after your visit with us. Thank you!             Your Updated Medication List - Protect others around you: Learn how to safely use, store and throw away your medicines at www.disposemymeds.org.          This list is accurate as of 3/8/18 12:04 PM.  Always use your most recent med list.                   Brand Name Dispense Instructions for use Diagnosis    albuterol 108 (90 BASE) MCG/ACT Inhaler    PROAIR HFA/PROVENTIL HFA/VENTOLIN HFA    1 Inhaler    Inhale 2 puffs into the lungs every 6 hours as needed for shortness of breath / dyspnea or wheezing    Acute bronchitis, unspecified organism       amylase-lipase-protease 13748-54948 UNITS Cpep per EC capsule    CREON    450 capsule    Take 2-3 with meals / 1-2 with snacks, up to 15 per day.    Necrotizing pancreatitis       dexamethasone 4 MG tablet    DECADRON    12 tablet    Take 2 tablets (8 mg) by mouth daily (with breakfast)    Pancreatic adenocarcinoma (H)       Fluorouracil 5 GM/100ML Soln    ADURCIL          HYDROcodone-acetaminophen 5-325 MG per tablet    NORCO          HYDROmorphone 2 MG tablet    DILAUDID    25 tablet    Take 1 tablet (2 mg) by mouth every 4 hours as needed for pain maximum 4 tablet(s) per day        LORazepam 0.5 MG tablet     ATIVAN    30 tablet    Take 1 tablet (0.5 mg) by mouth every 4 hours as needed (Anxiety, Nausea/Vomiting or Sleep)    Pancreatic adenocarcinoma (H)       ondansetron 8 MG tablet    ZOFRAN    30 tablet    Take 1 tablet (8 mg) by mouth every 8 hours as needed for nausea    Pancreatic adenocarcinoma (H)       oxyCODONE IR 5 MG tablet    ROXICODONE    30 tablet    Take 1 tablet (5 mg) by mouth every 4 hours as needed for moderate to severe pain    Necrotizing pancreatitis, Pancreatic adenocarcinoma (H)       prochlorperazine 10 MG tablet    COMPAZINE    30 tablet    Take 1 tablet (10 mg) by mouth every 6 hours as needed (Nausea/Vomiting)    Pancreatic adenocarcinoma (H)

## 2018-03-13 ENCOUNTER — HOSPITAL ENCOUNTER (OUTPATIENT)
Dept: CT IMAGING | Facility: CLINIC | Age: 58
Discharge: HOME | End: 2018-03-13
Attending: FAMILY MEDICINE | Admitting: FAMILY MEDICINE
Payer: COMMERCIAL

## 2018-03-13 ENCOUNTER — INFUSION THERAPY VISIT (OUTPATIENT)
Dept: INFUSION THERAPY | Facility: CLINIC | Age: 58
End: 2018-03-13
Attending: INTERNAL MEDICINE
Payer: COMMERCIAL

## 2018-03-13 DIAGNOSIS — C25.9 PANCREATIC ADENOCARCINOMA (H): ICD-10-CM

## 2018-03-13 PROCEDURE — 25000128 H RX IP 250 OP 636: Performed by: RADIOLOGY

## 2018-03-13 PROCEDURE — 25000125 ZZHC RX 250: Performed by: RADIOLOGY

## 2018-03-13 PROCEDURE — 36591 DRAW BLOOD OFF VENOUS DEVICE: CPT

## 2018-03-13 PROCEDURE — 74177 CT ABD & PELVIS W/CONTRAST: CPT

## 2018-03-13 PROCEDURE — 86301 IMMUNOASSAY TUMOR CA 19-9: CPT | Performed by: INTERNAL MEDICINE

## 2018-03-13 RX ORDER — IOPAMIDOL 755 MG/ML
77 INJECTION, SOLUTION INTRAVASCULAR ONCE
Status: COMPLETED | OUTPATIENT
Start: 2018-03-13 | End: 2018-03-13

## 2018-03-13 RX ORDER — HEPARIN SODIUM (PORCINE) LOCK FLUSH IV SOLN 100 UNIT/ML 100 UNIT/ML
5 SOLUTION INTRAVENOUS
Status: DISCONTINUED | OUTPATIENT
Start: 2018-03-13 | End: 2018-03-13 | Stop reason: HOSPADM

## 2018-03-13 RX ADMIN — SODIUM CHLORIDE 59 ML: 9 INJECTION, SOLUTION INTRAVENOUS at 10:18

## 2018-03-13 RX ADMIN — IOPAMIDOL 77 ML: 755 INJECTION, SOLUTION INTRAVENOUS at 10:19

## 2018-03-13 RX ADMIN — SODIUM CHLORIDE, PRESERVATIVE FREE 500 UNITS: 5 INJECTION INTRAVENOUS at 10:24

## 2018-03-13 NOTE — PROGRESS NOTES
Infusion Nursing Note:  Kitty Bangura presents today for PAC labs.    Patient seen by provider today: No   present during visit today: Not Applicable.    Note: Labs drawn via her port per Gleason protocol. Port left accessed for her CT today. Port flushed per Gleason protocol.    Intravenous Access:  Labs drawn without difficulty.  Implanted Port.    Treatment Conditions:  Not Applicable.      Post Infusion Assessment:  Blood return noted pre and post infusion.  Site patent and intact, free from redness, edema or discomfort.  No evidence of extravasations.    Discharge Plan:   Patient discharged in stable condition accompanied by: attendant.  Departure Mode: Wheelchair.    Serena Lucas RN

## 2018-03-13 NOTE — MR AVS SNAPSHOT
After Visit Summary   3/13/2018    Kitty Bangura    MRN: [id number]           Patient Information     Date Of Birth          1960        Visit Information        Provider Department      3/13/2018 9:00 AM ROOM 5 [facility]        Today's Diagnoses     Pancreatic adenocarcinoma (H)           Follow-ups after your visit        Your next 10 appointments already scheduled     Mar 13, 2018 10:00 AM CDT   (Arrive by 9:45 AM)   CT CHEST/ABDOMEN/PELVIS W CONTRAST with WYCT1   [facility] CT ([facility])    [address] [address]   [phone]           Please bring any scans or X-rays taken at other hospitals, if similar tests were done. Also bring a list of your medicines, including vitamins, minerals and over-the-counter drugs. It is safest to leave personal items at home.  Be sure to tell your doctor:   If you have any allergies.   If there s any chance you are pregnant.   If you are breastfeeding.  You may have contrast for this exam. To prepare:   Do not eat or drink for 2 hours before your exam. If you need to take medicine, you may take it with small sips of water. (We may ask you to take liquid medicine as well.)   The day before your exam, drink extra fluids at least six 8-ounce glasses (unless your doctor tells you to restrict your fluids).   You will be given instructions on how to drink the contrast.  Patients over 70 or patients with diabetes or kidney problems:   If you haven t had a blood test (creatinine test) within the last 30 days, the Cardiologist/Radiologist may require you to get this test prior to your exam.  If you have diabetes:   Continue to take your metformin medication on the day of your exam  Please wear loose clothing, such as a sweat suit or jogging clothes. Avoid snaps, zippers and other metal. We may ask you to undress and put on a hospital gown.  If you have any questions, please call the Imaging Department  where you will have your exam.            Mar 19, 2018  7:00 AM CDT   [facility] Draw with  [facility] LAB DRAW   [facility] Lab Draw ([facility])    [address]  [address]  [facility] [address]   [phone]            Mar 19, 2018  7:45 AM CDT   (Arrive by 7:30 AM)   Return Visit with Jovany Monk MD   [facility] ([facility])    [address]  [address]  [facility] [address]   [phone]            Mar 19, 2018  8:30 AM CDT   Infusion 360 with  ONCOLOGY INFUSION, UC 18 ATC   [facility] ([facility])    [address]  [address]  [facility] [address]   [phone]            Mar 26, 2018  1:00 PM CDT   (Arrive by 12:45 PM)   Return Visit with Ja Byrd MD   [facility] ([facility])    [address]  [address]  [facility] [address]   [phone]            Apr 02, 2018 11:40 AM CDT   (Arrive by 11:25 AM)   CT ABDOMEN PELVIS W/O & W CONTRAST with UCCT2   [facility] [place] [facility] ([facility])    [address]  [address]st [address]  [facility] [address]   [phone]           Please bring any scans or X-rays taken at other hospitals, if similar tests were done. Also bring a list of your medicines, including vitamins, minerals and over-the-counter drugs. It is safest to leave personal items at home.  Be sure to tell your doctor:   If you have any allergies.   If there s any chance you are pregnant.   If you are breastfeeding.  You may have contrast for this exam. To prepare:   Do not eat or drink for 2 hours before your exam. If you need to take medicine, you may take it with small sips of water. (We may ask you to take liquid medicine as well.)   The day before your exam, drink extra fluids at least six 8-ounce glasses (unless your doctor tells you to restrict your  fluids).   You will be given instructions on how to drink the contrast.  Patients over 70 or patients with diabetes or kidney problems:   If you haven t had a blood test (creatinine test) within the last 30 days, the Cardiologist/Radiologist may require you to get this test prior to your exam.  If you have diabetes:   Continue to take your metformin medication on the day of your exam  Please wear loose clothing, such as a sweat suit or jogging clothes. Avoid snaps, zippers and other metal. We may ask you to undress and put on a hospital gown.  If you have any questions, please call the Imaging Department where you will have your exam.            Apr 02, 2018  1:30 PM CDT   (Arrive by 1:15 PM)   Return Visit with Ja Byrd MD   [facility] ([facility])    [address] [place] [address]  [address] [place] [id number] [phone]            Apr 02, 2018  2:30 PM CDT   (Arrive by 2:15 PM)   PAC EVALUATION with  Pac Mara 2   [facility] Preoperative Assessment [place] ([facility])    [address] [place] [id number] [phone]            Apr 02, 2018  3:30 PM CDT   (Arrive by 3:15 PM)   PAC RN ASSESSMENT with Ynes Pac Rn   [facility] Preoperative [facility] [place] ([facility])    [address] [place] [id number] [phone]              Who to contact     If you have questions or need follow up information about today's clinic visit or your schedule please contact [facility] directly at [phone].  Normal or non-critical lab and imaging results will be communicated to you by MyChart, letter or phone within 4 business days after the clinic has received the results. If you do not hear from us within 7 days, please contact the clinic through MyChart or phone. If you have a critical or abnormal lab result, we will notify you by phone as soon as possible.  Submit  refill requests through [employer] or call your pharmacy and they will forward the refill request to us. Please allow 3 business days for your refill to be completed.          Additional Information About Your Visit        [employer] Information     [employer] gives you secure access to your electronic health record. If you see a primary care provider, you can also send messages to your care team and make appointments. If you have questions, please call your primary care clinic.  If you do not have a primary care provider, please call [phone] and they will assist you.        Care EveryWhere ID     This is your Care EveryWhere ID. This could be used by other organizations to access your [place] medical records  [id number]         Blood Pressure from Last 3 Encounters:   03/08/18 99/66   03/06/18 91/66   03/01/18 93/63    Weight from Last 3 Encounters:   03/06/18 71.6 kg (157 lb 12.8 oz)   03/01/18 70.9 kg (156 lb 6.4 oz)   02/20/18 73.2 kg (161 lb 6.4 oz)              We Performed the Following     Cancer antigen 19-9        Primary Care Provider Office Phone # Fax #    Williamsin Julito Mcgill [phone] [address] [address]        Equal Access to Services     CHASE DE GUZMAN : Hadii aad ku hadasho Soomaali, waaxda luqadaha, qaybta kaalmada adeegyada, re yip. So [facility] [phone].    ATENCIÓN: Si habla español, tiene a quiros disposición servicios gratuitos de asistencia lingüística. Llame al [phone].    We comply with applicable federal civil rights laws and [place] laws. We do not discriminate on the basis of race, color, national origin, age, disability, sex, sexual orientation, or gender identity.            Thank you!     Thank you for choosing [facility]  for your care. Our goal is always to provide you with excellent care. Hearing back from our patients is one way we can continue to improve our services. Please take a few  minutes to complete the written survey that you may receive in the mail after your visit with us. Thank you!             Your Updated Medication List - Protect others around you: Learn how to safely use, store and throw away your medicines at www.disposemymeds.org.          This list is accurate as of 3/13/18  9:52 AM.  Always use your most recent med list.                   Brand Name Dispense Instructions for use Diagnosis    albuterol 108 (90 BASE) MCG/ACT Inhaler    PROAIR HFA/PROVENTIL HFA/VENTOLIN HFA    1 Inhaler    Inhale 2 puffs into the lungs every 6 hours as needed for shortness of breath / dyspnea or wheezing    Acute bronchitis, unspecified organism       amylase-lipase-protease 04718-41760 UNITS Cpep per EC capsule    CREON    450 capsule    Take 2-3 with meals / 1-2 with snacks, up to 15 per day.    Necrotizing pancreatitis       dexamethasone 4 MG tablet    DECADRON    12 tablet    Take 2 tablets (8 mg) by mouth daily (with breakfast)    Pancreatic adenocarcinoma (H)       Fluorouracil 5 GM/100ML Soln    ADURCIL          HYDROcodone-acetaminophen 5-325 MG per tablet    NORCO          HYDROmorphone 2 MG tablet    DILAUDID    25 tablet    Take 1 tablet (2 mg) by mouth every 4 hours as needed for pain maximum 4 tablet(s) per day        LORazepam 0.5 MG tablet    ATIVAN    30 tablet    Take 1 tablet (0.5 mg) by mouth every 4 hours as needed (Anxiety, Nausea/Vomiting or Sleep)    Pancreatic adenocarcinoma (H)       ondansetron 8 MG tablet    ZOFRAN    30 tablet    Take 1 tablet (8 mg) by mouth every 8 hours as needed for nausea    Pancreatic adenocarcinoma (H)       oxyCODONE IR 5 MG tablet    ROXICODONE    30 tablet    Take 1 tablet (5 mg) by mouth every 4 hours as needed for moderate to severe pain    Necrotizing pancreatitis, Pancreatic adenocarcinoma (H)       prochlorperazine 10 MG tablet    COMPAZINE    30 tablet    Take 1 tablet (10 mg) by mouth every 6 hours as needed (Nausea/Vomiting)     Pancreatic adenocarcinoma (H)

## 2018-03-14 LAB — CANCER AG19-9 SERPL-ACNC: 10 U/ML (ref 0–37)

## 2018-03-16 ENCOUNTER — TELEPHONE (OUTPATIENT)
Dept: ONCOLOGY | Facility: CLINIC | Age: 58
End: 2018-03-16

## 2018-03-16 DIAGNOSIS — K13.79 MOUTH SORES: Primary | ICD-10-CM

## 2018-03-16 RX ORDER — SODIUM CHLORIDE 9 MG/ML
1000 INJECTION, SOLUTION INTRAVENOUS CONTINUOUS PRN
Status: CANCELLED
Start: 2018-03-20

## 2018-03-16 RX ORDER — ALBUTEROL SULFATE 0.83 MG/ML
2.5 SOLUTION RESPIRATORY (INHALATION)
Status: CANCELLED | OUTPATIENT
Start: 2018-03-20

## 2018-03-16 RX ORDER — EPINEPHRINE 0.3 MG/.3ML
0.3 INJECTION SUBCUTANEOUS EVERY 5 MIN PRN
Status: CANCELLED | OUTPATIENT
Start: 2018-03-20

## 2018-03-16 RX ORDER — ALBUTEROL SULFATE 90 UG/1
1-2 AEROSOL, METERED RESPIRATORY (INHALATION)
Status: CANCELLED
Start: 2018-03-20

## 2018-03-16 RX ORDER — PALONOSETRON 0.05 MG/ML
0.25 INJECTION, SOLUTION INTRAVENOUS ONCE
Status: CANCELLED | OUTPATIENT
Start: 2018-03-20

## 2018-03-16 RX ORDER — DIPHENHYDRAMINE HYDROCHLORIDE AND LIDOCAINE HYDROCHLORIDE AND ALUMINUM HYDROXIDE AND MAGNESIUM HYDRO
5-10 KIT EVERY 6 HOURS PRN
Qty: 237 ML | Refills: 1 | Status: SHIPPED | OUTPATIENT
Start: 2018-03-16 | End: 2018-04-02

## 2018-03-16 RX ORDER — MEPERIDINE HYDROCHLORIDE 25 MG/ML
25 INJECTION INTRAMUSCULAR; INTRAVENOUS; SUBCUTANEOUS EVERY 30 MIN PRN
Status: CANCELLED | OUTPATIENT
Start: 2018-03-20

## 2018-03-16 RX ORDER — FLUOROURACIL 50 MG/ML
400 INJECTION, SOLUTION INTRAVENOUS ONCE
Status: CANCELLED | OUTPATIENT
Start: 2018-03-20

## 2018-03-16 RX ORDER — NYSTATIN 100000/ML
500000 SUSPENSION, ORAL (FINAL DOSE FORM) ORAL 4 TIMES DAILY
Qty: 280 ML | Refills: 1 | Status: SHIPPED | OUTPATIENT
Start: 2018-03-16 | End: 2018-04-02

## 2018-03-16 RX ORDER — METHYLPREDNISOLONE SODIUM SUCCINATE 125 MG/2ML
125 INJECTION, POWDER, LYOPHILIZED, FOR SOLUTION INTRAMUSCULAR; INTRAVENOUS
Status: CANCELLED
Start: 2018-03-20

## 2018-03-16 RX ORDER — LORAZEPAM 2 MG/ML
0.5 INJECTION INTRAMUSCULAR EVERY 4 HOURS PRN
Status: CANCELLED
Start: 2018-03-20

## 2018-03-16 RX ORDER — EPINEPHRINE 1 MG/ML
0.3 INJECTION, SOLUTION, CONCENTRATE INTRAVENOUS EVERY 5 MIN PRN
Status: CANCELLED | OUTPATIENT
Start: 2018-03-20

## 2018-03-16 RX ORDER — DIPHENHYDRAMINE HYDROCHLORIDE 50 MG/ML
50 INJECTION INTRAMUSCULAR; INTRAVENOUS
Status: CANCELLED
Start: 2018-03-20

## 2018-03-16 ASSESSMENT — ENCOUNTER SYMPTOMS
EYE WATERING: 0
POSTURAL DYSPNEA: 1
EYE REDNESS: 0
BLOATING: 1
SLEEP DISTURBANCES DUE TO BREATHING: 0
DEPRESSION: 0
WEIGHT LOSS: 1
BOWEL INCONTINENCE: 0
ALTERED TEMPERATURE REGULATION: 1
NUMBNESS: 0
DIARRHEA: 1
ABDOMINAL PAIN: 1
DIZZINESS: 1
TROUBLE SWALLOWING: 1
HOARSE VOICE: 0
DECREASED APPETITE: 0
PARALYSIS: 0
SMELL DISTURBANCE: 0
ORTHOPNEA: 1
NECK MASS: 1
POLYPHAGIA: 0
TASTE DISTURBANCE: 1
POLYDIPSIA: 0
CONSTIPATION: 1
TINGLING: 1
SINUS CONGESTION: 1
TREMORS: 0
WEAKNESS: 1
NIGHT SWEATS: 0
INCREASED ENERGY: 1
SPUTUM PRODUCTION: 1
SYNCOPE: 0
WHEEZING: 1
HEARTBURN: 0
PALPITATIONS: 0
LEG PAIN: 0
FEVER: 0
EXERCISE INTOLERANCE: 0
LIGHT-HEADEDNESS: 1
DOUBLE VISION: 0
COUGH DISTURBING SLEEP: 1
SHORTNESS OF BREATH: 1
VOMITING: 0
SINUS PAIN: 0
LOSS OF CONSCIOUSNESS: 0
DECREASED CONCENTRATION: 1
RECTAL PAIN: 1
MEMORY LOSS: 0
COUGH: 1
NERVOUS/ANXIOUS: 0
HALLUCINATIONS: 0
HYPERTENSION: 0
FATIGUE: 1
SEIZURES: 0
INSOMNIA: 1
POOR WOUND HEALING: 0
PANIC: 0
HYPOTENSION: 1
SNORES LOUDLY: 0
WEIGHT GAIN: 0
NAIL CHANGES: 1
EYE IRRITATION: 0
DISTURBANCES IN COORDINATION: 0
SPEECH CHANGE: 0
BLOOD IN STOOL: 0
CHILLS: 0
DYSPNEA ON EXERTION: 0
SORE THROAT: 1
HEADACHES: 0
SKIN CHANGES: 0
EYE PAIN: 0
HEMOPTYSIS: 0
JAUNDICE: 0
NAUSEA: 1

## 2018-03-16 NOTE — TELEPHONE ENCOUNTER
Spouse (with pt in background) called in to triage reporting multiple mouth sores and white patches on tongue x3 days. She has been using baking soda/salt rinse 6x daily. Pt state she also has a large sore at the back of her throat causing pain with swallowing. She has been using straws to drink smoothies. Report small white patches on sides of tongues with yellow centers. Denied any fevers, chills, difficulty swallowing, n/v/d or abdominal pain. Request any Rx to go to [facility] pharmacy.

## 2018-03-16 NOTE — TELEPHONE ENCOUNTER
Ok for MMW and Nystatin. Pt informed and instructed to take q6h prn, wait at least 15 minutes between. Erx sent to [facility]. Advised pt if she develops fevers, chills or severe throat pain to go to ED over the weekend, she verbalized understanding.

## 2018-03-17 NOTE — PROGRESS NOTES
Oncology Follow-up Visit:  Mar 19, 2018    Cancer diagnosis: cystic pancreatic tail adenocarcinoma  small subcentimeter pulmonary nodules seen on staging scans of unclear etiology.    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    Referring physician: Dr. González Metz MD     Oncology History: Kitty Bangura is a 57 year old woman, previously healthy until she presented in early November 2017 with abdominal pain. CT abdomen on 11/1/17 showed acute pancreatitis (lipase 41,960) and a 3cm heterogenous pancreatic tail mass. The etiology of pancreatitis is unclear - no obstructing gallstone and not a heavy drinker. She was hospitalized for one week and followed up with Dr. González Metz in GI clinic.     11/29/17 -- endoscopic drainage of walled-off area of pancreatic necrosis by Dr. Metz. During the same procedure she had an EUS FNA of the pancreatic tail mass and pathology showed adenocarcinoma. The mass measured 33 x 27 mm, encapsulated with solid and cystic components, rim calcification, and no evidence of invasion.     12/9/17 -- Staging CT chest/abd/pelvis showed several small pulmonary nodules (largest 3mm), unchanged mass in the pancreatic tail, mildly prominent mesenteric nodes thought likely reactive, and small right hepatic lobe hypodensities. Abdominal MRI on 12/10/17 showed hepatic hemangiomas, no evidence of metastatic disease to the liver.     1/8/18 - - oncology consultation, Dr. Monk. Recommendation: neoadjuvant intent chemotherapy with FOLFIRINOX.    1/15/18 - - cycle 1/day one FOLFIRINOX chemotherapy.    1/20 through 1/27/18 - - hospitalization at the [facility], for acute pancreatitis. Following three days of nausea, vomiting, pain. During  this hospitalization: ERCP was attempted by Dr. Sanches with pancreatic stent placement, but pancreatic duct was completely obstructed and wire was unable to pass beyond the duct. Dr. Metz performed successful US guided cyst gastrostomy January 25, 2018.    1/31/18 - - oncology follow-up, DANTE Talavera. Neutropenic with ANC 0.4, thus defer cycle two of chemotherapy.    2/5/18 - - oncology follow-up, DANTE Junior.  Cycle 2/day one FOLFIRINOX chemotherapy. Patient endorses cold sensitivity secondary to oxaliplatin. Neulasta given for growth factor support. Due to significant neutropenia with cycle one.    2/11/18 - - ER evaluation for epigastric pain. Discharged to home from ER. Leukocytosis secondary to Neulasta given on February 8.    2/20/18 - - oncology follow-up, DANTE Junior. Cycle 3/day one FOLFIRINOX given without Neulasta. At patient request, oxaliplatin dose reduced by 10% due to neuropathy/cold sensitivity.    3/6/18 - - oncology follow-up, DANTE Talavera. Cycle 4/day one FOLFIRINOX chemotherapy.    3/13/18 - - CT chest/abdomen/pelvis - - IMPRESSION: 1. Minimal decrease in size and marked decrease in enhancement and pancreatic mass since the comparison study. 2. No significant change in low dense lesions in the liver since comparison.    3/16/18 - - oncology follow-up Dr. Monk. Plan is to hold on further chemotherapy as surgery is scheduled for 4/17/18      Interim History:  Kitty Bangura is a 57 year old year old woman here with her  for follow-up of pancreatic adenocarcinoma. She has received 4 cycles of FOLFIRINOX, with the 4th cycle starting 13 days ago. The last couple months have been tough. After the first cycle she was admitted for pancreatitis and had the pancreatic cyst drained endoscopically. The second cycle was delayed for neutropenia. More recently, she's had bronchitis for the past 2-3 weeks. Completed a course of azithromycin. She did have one  fever to 102 about two weeks ago. No further fevers.     She had thrush a few weeks ago, which has resolved with nystatin swish/spit.   Neuropathy is worse with cold exposure. Not interfering with daily activities.   She has lost about 35 lbs in the past 5 months.   Alternating between diarrhea and constipation.     Review Of Systems:  Full 10-point ROS reviewed. Pertinent symptoms reviewed above per HPI.    PAST MEDICAL HISTORY:   Pre-diabetes  Pancreatitis as above     PAST SURGICAL HISTORY:  Laparotomy x2 for ruptured tubal pregnancies    Discectomy for herniated lumbar disk  Breast reduction surgery     FAMILY HISTORY:  Colon cancer in maternal aunt  Paternal aunt and cousins with breast cancer  CAD in father and brother     SH: , from [place], with 29 yr-old son. Works as . former smoker, quit 30 years ago. two glasses of wine per night, none since pancreatitis diagnosis     Allergies: none      Current Outpatient Prescriptions:      magic mouthwash (FIRST-MOUTHWASH BLM) suspension, Swish and swallow 5-10 mLs in mouth every 6 hours as needed for mouth sores, Disp: 237 mL, Rfl: 1     nystatin (MYCOSTATIN) 757370 UNIT/ML suspension, Take 5 mLs (500,000 Units) by mouth 4 times daily Swish and spit 4 times daily, Disp: 280 mL, Rfl: 1     albuterol (PROAIR HFA/PROVENTIL HFA/VENTOLIN HFA) 108 (90 BASE) MCG/ACT Inhaler, Inhale 2 puffs into the lungs every 6 hours as needed for shortness of breath / dyspnea or wheezing, Disp: 1 Inhaler, Rfl: 0     dexamethasone (DECADRON) 4 MG tablet, Take 2 tablets (8 mg) by mouth daily (with breakfast), Disp: 12 tablet, Rfl: 2     HYDROmorphone (DILAUDID) 2 MG tablet, Take 1 tablet (2 mg) by mouth every 4 hours as needed for pain maximum 4 tablet(s) per day, Disp: 25 tablet, Rfl: 0     Fluorouracil (ADURCIL) 5 GM/100ML SOLN, , Disp: , Rfl:      HYDROcodone-acetaminophen (NORCO) 5-325 MG per tablet, , Disp: , Rfl: 0     oxyCODONE IR (ROXICODONE) 5 MG  "tablet, Take 1 tablet (5 mg) by mouth every 4 hours as needed for moderate to severe pain, Disp: 30 tablet, Rfl: 0     ondansetron (ZOFRAN) 8 MG tablet, Take 1 tablet (8 mg) by mouth every 8 hours as needed for nausea, Disp: 30 tablet, Rfl: 0     LORazepam (ATIVAN) 0.5 MG tablet, Take 1 tablet (0.5 mg) by mouth every 4 hours as needed (Anxiety, Nausea/Vomiting or Sleep), Disp: 30 tablet, Rfl: 2     prochlorperazine (COMPAZINE) 10 MG tablet, Take 1 tablet (10 mg) by mouth every 6 hours as needed (Nausea/Vomiting), Disp: 30 tablet, Rfl: 2     amylase-lipase-protease (CREON) 76688-74987 UNITS CPEP per EC capsule, Take 2-3 with meals / 1-2 with snacks, up to 15 per day., Disp: 450 capsule, Rfl: 6    Current Facility-Administered Medications:      sodium chloride (PF) 0.9% PF flush 20 mL, 20 mL, Intracatheter, Q1H PRN, Jovany Monk MD, 20 mL at 03/19/18 0656      Physical Exam:  /71 (BP Location: Right arm, Patient Position: Sitting, Cuff Size: Adult Regular)  Pulse 77  Temp 97.7  F (36.5  C) (Oral)  Resp 16  Ht 1.651 m (5' 5\")  Wt 70.9 kg (156 lb 3.2 oz)  SpO2 98%  BMI 25.99 kg/m2  General: NAD, alert and interactive  HEENT: PERRL, MMM, several small oral ulcers   Lungs: CTA bilaterally  Cardiovascular: RRR, no M/R/G, no edema  Abdominal/Rectal: +BS, soft, NT, ND  Lymph: no cervical, supraclavicular, axillary, or inguinal adenopathy  MSK: normal muscle tone  Skin: no rashes or petechaie  Neuro: A&O       Laboratory/Imaging Studies  CBC RESULTS:   Recent Labs   Lab Test  03/19/18   0703   WBC  22.8*   RBC  3.60*   HGB  11.1*   HCT  34.1*   MCV  95   MCH  30.8   MCHC  32.6   RDW  17.8*   PLT  112*     Recent Labs   Lab Test  03/19/18   0703  03/06/18   0702   NA  139  140   POTASSIUM  3.6  3.9   CHLORIDE  104  106   CO2  24  26   ANIONGAP  11  8   GLC  163*  105*   BUN  13  14   CR  0.62  0.61   ILIANA  9.0  8.9     Liver Function Studies -   Recent Labs   Lab Test  03/19/18   0703   PROTTOTAL  6.7*   ALBUMIN  " 2.9*   BILITOTAL  0.3   ALKPHOS  186*   AST  31   ALT  33       Results for KITTY CHAVIS (MRN [id number]) as of 3/17/2018 16:51   Ref. Range 1/15/2018 08:40 3/13/2018 09:20   Cancer Antigen 19-9 Latest Ref Range: 0 - 37 U/mL 12 10     RADIOLOGY:  We reviewed the CT report from 3/13/18 with the patient and provided a copy.     CT cap 3/13/18  1. Minimal decrease in size and marked decrease in enhancement and  pancreatic mass since the comparison study.  2. No significant change in low dense lesions in the liver since  comparison.    ASSESSMENT/PLAN:  Pancreatic tail adenocarcinoma: Kitty was diagnosed in December 2017 after presenting with pancreatitis. She is now s/p 4 cycles of neoadjuvant intent FOLFIRINOX. She had another bout of pancreatitis after the first cycle and neutropenia that delayed cycle 2. Oxaliplatin was reduced to 90% target dose with cycle 3 due to neuropathy.     Imaging now shows minimal decrease in size of the pancreatic mass. The small pulmonary nodules (largest 3mm) are stable. An abdominal MRI previously characterized several lesions as hepatic hemangiomas and these areas are unchanged now. Her baseline Ca 19-9 was normal at baseline and remains normal (12 --> 10). Overall she has stable disease.     She previously consulted with Dr. Byrd about surgical resection which would be distal pancreatectomy and splenectomy. She has follow-up with him next week and is tentatively scheduled for surgery on 4/17. She has received adequate neoadjuvant chemotherapy and we will hold on further treatment so she can recover from surgery.     She has lost significant weight and has already met with our nutritionist. We recommended that she schedule a follow-up appointment. We referred her to cancer rehab to help improve her strength prior to surgery.     We will see her back about 4 weeks after surgery. At that point we will discuss adjuvant chemotherapy with gemcitabine/Xeloda per the ESPAC-4 study, to  complete a total of 6 months of neoadjuvant plus adjuvant chemo.     Patient seen and discussed with staff Dr. Aleshia Ponce MD  Heme/Onc Fellow  Pager: [phone]      MEDICAL ONCOLOGY ATTENDING PHYSICIAN ADDENDUM:  I have seen and evaluated this patient with the medical oncology fellow. I have reviewed and edited this fellow's note, and agree with the assessment and plan stated above.     Ms. Bangura returns to the clinic today with her .  She has undergone 4 cycles of neoadjuvant-intent FOLFIRINOX.  She has had a quite difficult disease course leading up to and following diagnosis.  The most complicating issue is walled-off pancreatic necrosis which has been rather severe.  She had infected necrotizing pancreatitis requiring multiple endoscopic interventions.  Her pancreatitis has been severe enough that it has caused further issues and multiple hospitalizations since initiation of chemotherapy in mid January.  For example in late January, she was hospitalized for a week with nausea, vomiting and pain.  There were some diagnostic difficulties and interventional difficulties in terms of getting stents placed.  She had an ultrasound-guided cyst gastrostomy in late January by Dr. Metz.  In terms of chemotherapy complications, she has had incidences of neutropenia requiring growth factor support and also oxaliplatin-induced sensory neuropathy after just 1 or 2 cycles.  This has necessitated a 10% reduction of oxaliplatin dosing.  She has undergone 4 cycles thus far, the most recent of which was 03/06.  Her CT scan on 03/18 shows a minimal decrease in size.  There is decreased enhancement and some residual necrosis per my view.  She otherwise is in good spirits today.  She had a recent bout of oral thrush which she is completing nystatin.  She has had some on and off abdominal pain that has resolved in recent weeks.  She has lost overall 30 pounds since initiation of chemotherapy and has been meeting  with our nutritionist several times.      PHYSICAL EXAMINATION:  I personally performed the exam.  The abdomen was mildly tender in the right upper quadrant.  No palpable masses.  There is no evidence of hepatosplenomegaly.  Her lungs are rather clear despite the recent bout of bronchitis, which is resolving.  Cardiovascular, head and neck exam and lower extremity exam is otherwise unremarkable.  Her CA 19.9 is also unremarkable at 10.  It was 12 at baseline 01/15.        In summary, she has a cystic pancreatic tail adenocarcinoma that appears to be localized.  There are some small subcentimeter pulmonary nodules seen at the time of initial staging that remain unclear and may not be indicative of pulmonary metastasis, but it is too early to tell.  She is scheduled to meet Dr. Ja Byrd 1 week from today, but he was kindly agreeing to meet the patient today.  She is tentatively on the schedule for distal pancreatectomy on 04/17.  Assuming Dr. Byrd moves forward with surgery, we would hold chemotherapy as of today and see her back approximately 1 month following the surgery, which would be mid May.  We will refer her to cancer rehabilitation for PT and OT to increase her strength going into surgery.  I also requested that she meet up with Shivani Blas from Nutrition again to help improve nutrition further.  I also offered a Palliative Care consultation so she can get some supportive care to further improve her symptoms related to the cancer and chemotherapy, and she accepted that referral.  We will get all those scheduled today.           I spent 30 minutes in consultation, including H&P and Discussion. >50% of time was spent in counseling and in coordination of care.    Jovany Monk MD, PhD

## 2018-03-19 ENCOUNTER — ONCOLOGY VISIT (OUTPATIENT)
Dept: ONCOLOGY | Facility: CLINIC | Age: 58
End: 2018-03-19
Attending: INTERNAL MEDICINE
Payer: COMMERCIAL

## 2018-03-19 ENCOUNTER — APPOINTMENT (OUTPATIENT)
Dept: LAB | Facility: CLINIC | Age: 58
End: 2018-03-19
Attending: INTERNAL MEDICINE
Payer: COMMERCIAL

## 2018-03-19 VITALS
RESPIRATION RATE: 16 BRPM | SYSTOLIC BLOOD PRESSURE: 103 MMHG | HEIGHT: 65 IN | TEMPERATURE: 97.7 F | HEART RATE: 77 BPM | WEIGHT: 156.2 LBS | DIASTOLIC BLOOD PRESSURE: 71 MMHG | OXYGEN SATURATION: 98 % | BODY MASS INDEX: 26.02 KG/M2

## 2018-03-19 DIAGNOSIS — C25.9 PANCREAS CANCER (H): ICD-10-CM

## 2018-03-19 LAB
ALBUMIN SERPL-MCNC: 2.9 G/DL (ref 3.4–5)
ALP SERPL-CCNC: 186 U/L (ref 40–150)
ALT SERPL W P-5'-P-CCNC: 33 U/L (ref 0–50)
ANION GAP SERPL CALCULATED.3IONS-SCNC: 11 MMOL/L (ref 3–14)
ANISOCYTOSIS BLD QL SMEAR: SLIGHT
AST SERPL W P-5'-P-CCNC: 31 U/L (ref 0–45)
BASOPHILS # BLD AUTO: 0 10E9/L (ref 0–0.2)
BASOPHILS NFR BLD AUTO: 0 %
BILIRUB SERPL-MCNC: 0.3 MG/DL (ref 0.2–1.3)
BUN SERPL-MCNC: 13 MG/DL (ref 7–30)
CALCIUM SERPL-MCNC: 9 MG/DL (ref 8.5–10.1)
CHLORIDE SERPL-SCNC: 104 MMOL/L (ref 94–109)
CO2 SERPL-SCNC: 24 MMOL/L (ref 20–32)
CREAT SERPL-MCNC: 0.62 MG/DL (ref 0.52–1.04)
DIFFERENTIAL METHOD BLD: ABNORMAL
EOSINOPHIL # BLD AUTO: 0 10E9/L (ref 0–0.7)
EOSINOPHIL NFR BLD AUTO: 0 %
ERYTHROCYTE [DISTWIDTH] IN BLOOD BY AUTOMATED COUNT: 17.8 % (ref 10–15)
GFR SERPL CREATININE-BSD FRML MDRD: >90 ML/MIN/1.7M2
GLUCOSE SERPL-MCNC: 163 MG/DL (ref 70–99)
HCT VFR BLD AUTO: 34.1 % (ref 35–47)
HGB BLD-MCNC: 11.1 G/DL (ref 11.7–15.7)
INR PPP: 1.06 (ref 0.86–1.14)
LYMPHOCYTES # BLD AUTO: 4.1 10E9/L (ref 0.8–5.3)
LYMPHOCYTES NFR BLD AUTO: 18.2 %
MCH RBC QN AUTO: 30.8 PG (ref 26.5–33)
MCHC RBC AUTO-ENTMCNC: 32.6 G/DL (ref 31.5–36.5)
MCV RBC AUTO: 95 FL (ref 78–100)
METAMYELOCYTES # BLD: 0.2 10E9/L
METAMYELOCYTES NFR BLD MANUAL: 0.9 %
MONOCYTES # BLD AUTO: 1.4 10E9/L (ref 0–1.3)
MONOCYTES NFR BLD AUTO: 6.1 %
MYELOCYTES # BLD: 0.2 10E9/L
MYELOCYTES NFR BLD MANUAL: 0.9 %
NEUTROPHILS # BLD AUTO: 16.8 10E9/L (ref 1.6–8.3)
NEUTROPHILS NFR BLD AUTO: 73.9 %
PLATELET # BLD AUTO: 112 10E9/L (ref 150–450)
PLATELET # BLD EST: ABNORMAL 10*3/UL
POTASSIUM SERPL-SCNC: 3.6 MMOL/L (ref 3.4–5.3)
PROT SERPL-MCNC: 6.7 G/DL (ref 6.8–8.8)
RBC # BLD AUTO: 3.6 10E12/L (ref 3.8–5.2)
SODIUM SERPL-SCNC: 139 MMOL/L (ref 133–144)
WBC # BLD AUTO: 22.8 10E9/L (ref 4–11)

## 2018-03-19 PROCEDURE — 80053 COMPREHEN METABOLIC PANEL: CPT | Performed by: SURGERY

## 2018-03-19 PROCEDURE — 36591 DRAW BLOOD OFF VENOUS DEVICE: CPT

## 2018-03-19 PROCEDURE — 85610 PROTHROMBIN TIME: CPT | Performed by: SURGERY

## 2018-03-19 PROCEDURE — 99214 OFFICE O/P EST MOD 30 MIN: CPT | Mod: GC | Performed by: INTERNAL MEDICINE

## 2018-03-19 PROCEDURE — 25000128 H RX IP 250 OP 636: Mod: ZF | Performed by: INTERNAL MEDICINE

## 2018-03-19 PROCEDURE — G0463 HOSPITAL OUTPT CLINIC VISIT: HCPCS | Mod: ZF

## 2018-03-19 PROCEDURE — 85025 COMPLETE CBC W/AUTO DIFF WBC: CPT | Performed by: SURGERY

## 2018-03-19 RX ORDER — HEPARIN SODIUM (PORCINE) LOCK FLUSH IV SOLN 100 UNIT/ML 100 UNIT/ML
5 SOLUTION INTRAVENOUS
Status: COMPLETED | OUTPATIENT
Start: 2018-03-19 | End: 2018-03-19

## 2018-03-19 RX ADMIN — SODIUM CHLORIDE, PRESERVATIVE FREE 5 ML: 5 INJECTION INTRAVENOUS at 06:56

## 2018-03-19 ASSESSMENT — PAIN SCALES - GENERAL: PAINLEVEL: NO PAIN (0)

## 2018-03-19 NOTE — MR AVS SNAPSHOT
After Visit Summary   3/19/2018    Kitty Bangura    MRN: [id number]           Patient Information     Date Of Birth          1960        Visit Information        Provider Department      3/19/2018 7:45 AM Jovany Monk MD [facility]        Today's Diagnoses     Pancreas cancer (H)           Follow-ups after your visit        Additional Services     PALLIATIVE CARE REFERRAL       Your provider has referred you to Palliative Care Services.    To schedule an Outpatient Palliative Care Referral appointment, please call: [facility]: [facility] [phone]   [email or web address]    Please be aware that coverage of these services is subject to the terms and limitations of your health insurance plan.  Call member services at your health plan with any benefit or coverage questions.      Please bring the following with you to your appointment:    (1) Any X-Rays, CTs or MRIs which have been performed.  Contact the facility where they were done to arrange for  prior to your scheduled appointment.   (2) If you have recently seen a provider outside of the [facility], please bring your most recent clinic note and/or imaging results  (3) List of current medications - please bring ALL of the medications that you are currently taking (in their original bottles) to your appointment  (4) This referral request  (5) Any documents/labs given to you for this referral    Services Requested: Evaluate and treat symptoms (including writing prescriptions)    Please complete the following questions:  1. What is patient's life-limiting diagnosis? Pancreatic cancer, although goal is cure.   2. What is the reason for the referral? Help with treatment-related side effects.    3. What is the patient's prognosis? Pending surgery and adjuvant chemotherapy    Palliative Care Definition:    Palliative Care is specialized medical care for people with serious  "illness.  This type of care is focused on providing patients with relief from symptoms, pain and stresses of serious illness - whatever the diagnosis may be.  The goal of Palliative Care is to improve quality of life for both the patient and the family.  Palliative Care is provided by a team of doctors, nurses and other specialists who work with the patient's other doctors to provide an extra layer of support.  Palliative Care is appropriate at any age and at any stage in a serious illness, and can be provided together with curative treatment.            PHYSICAL THERAPY REFERRAL       *This therapy referral will be filtered to a centralized scheduling office at [facility] and the patient will receive a call to schedule an appointment at a [facility] location most convenient for them. *     [facility] provides Physical Therapy evaluation and treatment and many specialty services across the [facility] system.  If requesting a specialty program, please choose from the list below.    If you have not heard from the scheduling office within 2 business days, please call [phone] for all locations, with the exception of [place], please call [phone] and [facility], please call [phone]  Treatment: Evaluation & Treatment  Special Instructions/Modalities: surgery planned for 4/17/18. Pre-op rehab to optimize function before surgery   Special Programs: Cancer Rehabilitation (PT and OT Evaluate & Treat)    Please be aware that coverage of these services is subject to the terms and limitations of your health insurance plan.  Call member services at your health plan with any benefit or coverage questions.      **Note to Provider:  If you are referring outside of [facility] for the therapy appointment, please list the name of the location in the \"special instructions\" above, print the referral and give to the patient to schedule the appointment.                  Your next 10 " appointments already scheduled     Mar 22, 2018  9:00 AM CDT   Cancer Rehab Eval with Lora Davis PT   [facility] ([facility])    [address]  [address]  [facility] [facility] [place] [address]   [phone]            Mar 26, 2018  1:00 PM CDT   (Arrive by 12:45 PM)   Return Visit with Ja Byrd MD   [facility] ([facility])    [address]  [address]  [place] [address]   [phone]            Apr 02, 2018 11:00 AM CDT   Lab with  LAB   [facility] ([facility])    [address]  1st Floor  [place] [address] [phone]            Apr 02, 2018 11:40 AM CDT   (Arrive by 11:25 AM)   CT ABDOMEN PELVIS W/O & W CONTRAST with UCCT2   [facility] ([facility])    [address]  1st Floor  [place] [address] [phone]           Please bring any scans or X-rays taken at other hospitals, if similar tests were done. Also bring a list of your medicines, including vitamins, minerals and over-the-counter drugs. It is safest to leave personal items at home.  Be sure to tell your doctor:   If you have any allergies.   If there s any chance you are pregnant.   If you are breastfeeding.  You may have contrast for this exam. To prepare:   Do not eat or drink for 2 hours before your exam. If you need to take medicine, you may take it with small sips of water. (We may ask you to take liquid medicine as well.)   The day before your exam, drink extra fluids at least six 8-ounce glasses (unless your doctor tells you to restrict your fluids).   You will be given instructions on how to drink the contrast.  Patients over 70 or patients with diabetes or kidney problems:   If you haven t had a blood test (creatinine test) within the last 30 days, the Cardiologist/Radiologist may require you to get this test prior to your exam.  If you have  diabetes:   Continue to take your metformin medication on the day of your exam  Please wear loose clothing, such as a sweat suit or jogging clothes. Avoid snaps, zippers and other metal. We may ask you to undress and put on a hospital gown.  If you have any questions, please call the Imaging Department where you will have your exam.            Apr 02, 2018  1:30 PM CDT   (Arrive by 1:15 PM)   Return Visit with Ja Byrd MD   [facility] ([facility])    [address] [place] [address]  [address] [place] [address]   [phone]            Apr 02, 2018  2:30 PM CDT   (Arrive by 2:15 PM)   PAC EVALUATION with  Pac Mara 2   [facility] Preoperative [facility] [place] ([facility])    [address] [place] [address]  [address] [facility] [address]   [phone]            Apr 02, 2018  3:30 PM CDT   (Arrive by 3:15 PM)   PAC RN ASSESSMENT with  Pac Rn   [facility] [place] ([facility])    [address] [place] [address]  [address] [facility] [address]   [phone]            Apr 02, 2018  4:00 PM CDT   (Arrive by 3:45 PM)   PAC Anesthesia Consult with  Pac Anesthesiologist   [facility] ([facility])    [address] [place] [address]  [address] [facility] [address]   [phone]            Apr 17, 2018   Procedure with Ja Byrd MD   [facility] [place] Same Day Surgery (--)    [address] [address]   [phone]            May 04, 2018  9:00 AM CDT   (Arrive by 8:45 AM)   New Patient Visit with Brandyn Snow MD   [facility] ([facility])    [address] [place] [address] [phone]              Who to contact     If you have questions or need follow up information about today's clinic visit or your schedule please contact [facility] directly at  "[phone].  Normal or non-critical lab and imaging results will be communicated to you by MyChart, letter or phone within 4 business days after the clinic has received the results. If you do not hear from us within 7 days, please contact the clinic through [employer] or phone. If you have a critical or abnormal lab result, we will notify you by phone as soon as possible.  Submit refill requests through [employer] or call your pharmacy and they will forward the refill request to us. Please allow 3 business days for your refill to be completed.          Additional Information About Your Visit        [employer] Information     [employer] gives you secure access to your electronic health record. If you see a primary care provider, you can also send messages to your care team and make appointments. If you have questions, please call your primary care clinic.  If you do not have a primary care provider, please call [phone] and they will assist you.        Care EveryWhere ID     This is your Care EveryWhere ID. This could be used by other organizations to access your [facility] medical records  [id number]        Your Vitals Were     Pulse Temperature Respirations Height Pulse Oximetry BMI (Body Mass Index)    77 97.7  F (36.5  C) (Oral) 16 1.651 m (5' 5\") 98% 25.99 kg/m2       Blood Pressure from Last 3 Encounters:   03/19/18 103/71   03/08/18 99/66   03/06/18 91/66    Weight from Last 3 Encounters:   03/19/18 70.9 kg (156 lb 3.2 oz)   03/06/18 71.6 kg (157 lb 12.8 oz)   03/01/18 70.9 kg (156 lb 6.4 oz)              We Performed the Following     CBC with platelets differential     Comprehensive metabolic panel     INR     PALLIATIVE CARE REFERRAL     PHYSICAL THERAPY REFERRAL        Primary Care Provider Office Phone # Fax #    Solange Mcgill [phone] [address] [address]        Equal Access to Services     CHASE DE GUZMAN AH: solange Stern, " larisa snider maryre ramon. So [facility] [phone].    ATENCIÓN: Si odessa vasquez, tiene a quiros disposición servicios gratuitos de asistencia lingüística. Macy al [phone].    We comply with applicable federal civil rights laws and [place] laws. We do not discriminate on the basis of race, color, national origin, age, disability, sex, sexual orientation, or gender identity.            Thank you!     Thank you for choosing [facility]  for your care. Our goal is always to provide you with excellent care. Hearing back from our patients is one way we can continue to improve our services. Please take a few minutes to complete the written survey that you may receive in the mail after your visit with us. Thank you!             Your Updated Medication List - Protect others around you: Learn how to safely use, store and throw away your medicines at www.disposemymeds.org.          This list is accurate as of 3/19/18  8:55 AM.  Always use your most recent med list.                   Brand Name Dispense Instructions for use Diagnosis    albuterol 108 (90 BASE) MCG/ACT Inhaler    PROAIR HFA/PROVENTIL HFA/VENTOLIN HFA    1 Inhaler    Inhale 2 puffs into the lungs every 6 hours as needed for shortness of breath / dyspnea or wheezing    Acute bronchitis, unspecified organism       amylase-lipase-protease 14592-66069 UNITS Cpep per EC capsule    CREON    450 capsule    Take 2-3 with meals / 1-2 with snacks, up to 15 per day.    Necrotizing pancreatitis       dexamethasone 4 MG tablet    DECADRON    12 tablet    Take 2 tablets (8 mg) by mouth daily (with breakfast)    Pancreatic adenocarcinoma (H)       Fluorouracil 5 GM/100ML Soln    ADURCIL          HYDROcodone-acetaminophen 5-325 MG per tablet    NORCO          HYDROmorphone 2 MG tablet    DILAUDID    25 tablet    Take 1 tablet (2 mg) by mouth every 4 hours as needed for pain maximum 4 tablet(s) per day         LORazepam 0.5 MG tablet    ATIVAN    30 tablet    Take 1 tablet (0.5 mg) by mouth every 4 hours as needed (Anxiety, Nausea/Vomiting or Sleep)    Pancreatic adenocarcinoma (H)       magic mouthwash suspension (diphenhydrAMINE, lidocaine, aluminum-magnesium & simethicone) Susp compounding kit     237 mL    Swish and swallow 5-10 mLs in mouth every 6 hours as needed for mouth sores    Mouth sores       nystatin 934292 UNIT/ML suspension    MYCOSTATIN    280 mL    Take 5 mLs (500,000 Units) by mouth 4 times daily Swish and spit 4 times daily    Mouth sores       ondansetron 8 MG tablet    ZOFRAN    30 tablet    Take 1 tablet (8 mg) by mouth every 8 hours as needed for nausea    Pancreatic adenocarcinoma (H)       oxyCODONE IR 5 MG tablet    ROXICODONE    30 tablet    Take 1 tablet (5 mg) by mouth every 4 hours as needed for moderate to severe pain    Necrotizing pancreatitis, Pancreatic adenocarcinoma (H)       prochlorperazine 10 MG tablet    COMPAZINE    30 tablet    Take 1 tablet (10 mg) by mouth every 6 hours as needed (Nausea/Vomiting)    Pancreatic adenocarcinoma (H)

## 2018-03-19 NOTE — NURSING NOTE
"Chief Complaint   Patient presents with     Port Draw     labs drawn from port by rn.  vs taken     Port accessed with 20 gauge 3/4\" gripper needle and labs drawn by rn.  Port flushed with NS and heparin; port de-accessed.  Pt tolerated well.  VS taken.  Pt checked in for next appt.  Janki Elias RN      "

## 2018-03-19 NOTE — LETTER
3/19/2018       RE: Kitty Bangura  [address] [facility] [address]     Dear Colleague,    Thank you for referring your patient, Kitty Bangura, to the [facility]. Please see a copy of my visit note below.    Oncology Follow-up Visit:  Mar 19, 2018    Cancer diagnosis: cystic pancreatic tail adenocarcinoma  small subcentimeter pulmonary nodules seen on staging scans of unclear etiology.    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    Referring physician: Dr. González Metz MD     Oncology History: Kitty Bangura is a 57 year old woman, previously healthy until she presented in early November 2017 with abdominal pain. CT abdomen on 11/1/17 showed acute pancreatitis (lipase 41,960) and a 3cm heterogenous pancreatic tail mass. The etiology of pancreatitis is unclear - no obstructing gallstone and not a heavy drinker. She was hospitalized for one week and followed up with Dr. González Metz in GI clinic.     11/29/17 -- endoscopic drainage of walled-off area of pancreatic necrosis by Dr. Metz. During the same procedure she had an EUS FNA of the pancreatic tail mass and pathology showed adenocarcinoma. The mass measured 33 x 27 mm, encapsulated with solid and cystic components, rim calcification, and no evidence of invasion.     12/9/17 -- Staging CT chest/abd/pelvis showed several small pulmonary nodules (largest 3mm), unchanged mass in the pancreatic tail, mildly prominent mesenteric nodes thought likely reactive, and small right hepatic lobe hypodensities. Abdominal MRI on 12/10/17 showed hepatic hemangiomas, no evidence of metastatic disease to the liver.     1/8/18 - - oncology consultation, Dr. Monk. Recommendation: neoadjuvant intent  chemotherapy with FOLFIRINOX.    1/15/18 - - cycle 1/day one FOLFIRINOX chemotherapy.    1/20 through 1/27/18 - - hospitalization at the [facility], for acute pancreatitis. Following three days of nausea, vomiting, pain. During this hospitalization: ERCP was attempted by Dr. Sanches with pancreatic stent placement, but pancreatic duct was completely obstructed and wire was unable to pass beyond the duct. Dr. Metz performed successful US guided cyst gastrostomy January 25, 2018.    1/31/18 - - oncology follow-up, DANTE Talavera. Neutropenic with ANC 0.4, thus defer cycle two of chemotherapy.    2/5/18 - - oncology follow-up, DANTE Junior.  Cycle 2/day one FOLFIRINOX chemotherapy. Patient endorses cold sensitivity secondary to oxaliplatin. Neulasta given for growth factor support. Due to significant neutropenia with cycle one.    2/11/18 - - ER evaluation for epigastric pain. Discharged to home from ER. Leukocytosis secondary to Neulasta given on February 8.    2/20/18 - - oncology follow-up, DANTE Junior. Cycle 3/day one FOLFIRINOX given without Neulasta. At patient request, oxaliplatin dose reduced by 10% due to neuropathy/cold sensitivity.    3/6/18 - - oncology follow-up, DANTE Talavera. Cycle 4/day one FOLFIRINOX chemotherapy.    3/13/18 - - CT chest/abdomen/pelvis - - IMPRESSION: 1. Minimal decrease in size and marked decrease in enhancement and pancreatic mass since the comparison study. 2. No significant change in low dense lesions in the liver since comparison.    3/16/18 - - oncology follow-up Dr. Monk. Plan is to hold on further chemotherapy as surgery is scheduled for 4/17/18      Interim History:  Kitty Bangura is a 57 year old year old woman here with her  for follow-up of pancreatic adenocarcinoma. She has received 4 cycles of FOLFIRINOX, with the 4th cycle starting 13 days ago. The last couple months have been tough. After the first cycle she was  admitted for pancreatitis and had the pancreatic cyst drained endoscopically. The second cycle was delayed for neutropenia. More recently, she's had bronchitis for the past 2-3 weeks. Completed a course of azithromycin. She did have one fever to 102 about two weeks ago. No further fevers.     She had thrush a few weeks ago, which has resolved with nystatin swish/spit.   Neuropathy is worse with cold exposure. Not interfering with daily activities.   She has lost about 35 lbs in the past 5 months.   Alternating between diarrhea and constipation.     Review Of Systems:  Full 10-point ROS reviewed. Pertinent symptoms reviewed above per HPI.    PAST MEDICAL HISTORY:   Pre-diabetes  Pancreatitis as above     PAST SURGICAL HISTORY:  Laparotomy x2 for ruptured tubal pregnancies    Discectomy for herniated lumbar disk  Breast reduction surgery     FAMILY HISTORY:  Colon cancer in maternal aunt  Paternal aunt and cousins with breast cancer  CAD in father and brother     SH: , from [place], with 29 yr-old son. Works as . former smoker, quit 30 years ago. two glasses of wine per night, none since pancreatitis diagnosis     Allergies: none      Current Outpatient Prescriptions:      magic mouthwash (FIRST-MOUTHWASH BLM) suspension, Swish and swallow 5-10 mLs in mouth every 6 hours as needed for mouth sores, Disp: 237 mL, Rfl: 1     nystatin (MYCOSTATIN) 417362 UNIT/ML suspension, Take 5 mLs (500,000 Units) by mouth 4 times daily Swish and spit 4 times daily, Disp: 280 mL, Rfl: 1     albuterol (PROAIR HFA/PROVENTIL HFA/VENTOLIN HFA) 108 (90 BASE) MCG/ACT Inhaler, Inhale 2 puffs into the lungs every 6 hours as needed for shortness of breath / dyspnea or wheezing, Disp: 1 Inhaler, Rfl: 0     dexamethasone (DECADRON) 4 MG tablet, Take 2 tablets (8 mg) by mouth daily (with breakfast), Disp: 12 tablet, Rfl: 2     HYDROmorphone (DILAUDID) 2 MG tablet, Take 1 tablet (2 mg) by mouth every 4 hours as needed  "for pain maximum 4 tablet(s) per day, Disp: 25 tablet, Rfl: 0     Fluorouracil (ADURCIL) 5 GM/100ML SOLN, , Disp: , Rfl:      HYDROcodone-acetaminophen (NORCO) 5-325 MG per tablet, , Disp: , Rfl: 0     oxyCODONE IR (ROXICODONE) 5 MG tablet, Take 1 tablet (5 mg) by mouth every 4 hours as needed for moderate to severe pain, Disp: 30 tablet, Rfl: 0     ondansetron (ZOFRAN) 8 MG tablet, Take 1 tablet (8 mg) by mouth every 8 hours as needed for nausea, Disp: 30 tablet, Rfl: 0     LORazepam (ATIVAN) 0.5 MG tablet, Take 1 tablet (0.5 mg) by mouth every 4 hours as needed (Anxiety, Nausea/Vomiting or Sleep), Disp: 30 tablet, Rfl: 2     prochlorperazine (COMPAZINE) 10 MG tablet, Take 1 tablet (10 mg) by mouth every 6 hours as needed (Nausea/Vomiting), Disp: 30 tablet, Rfl: 2     amylase-lipase-protease (CREON) 49241-50348 UNITS CPEP per EC capsule, Take 2-3 with meals / 1-2 with snacks, up to 15 per day., Disp: 450 capsule, Rfl: 6    Current Facility-Administered Medications:      sodium chloride (PF) 0.9% PF flush 20 mL, 20 mL, Intracatheter, Q1H PRN, Jovany Monk MD, 20 mL at 03/19/18 0656      Physical Exam:  /71 (BP Location: Right arm, Patient Position: Sitting, Cuff Size: Adult Regular)  Pulse 77  Temp 97.7  F (36.5  C) (Oral)  Resp 16  Ht 1.651 m (5' 5\")  Wt 70.9 kg (156 lb 3.2 oz)  SpO2 98%  BMI 25.99 kg/m2  General: NAD, alert and interactive  HEENT: PERRL, MMM, several small oral ulcers   Lungs: CTA bilaterally  Cardiovascular: RRR, no M/R/G, no edema  Abdominal/Rectal: +BS, soft, NT, ND  Lymph: no cervical, supraclavicular, axillary, or inguinal adenopathy  MSK: normal muscle tone  Skin: no rashes or petechaie  Neuro: A&O       Laboratory/Imaging Studies  CBC RESULTS:   Recent Labs   Lab Test  03/19/18   0703   WBC  22.8*   RBC  3.60*   HGB  11.1*   HCT  34.1*   MCV  95   MCH  30.8   MCHC  32.6   RDW  17.8*   PLT  112*     Recent Labs   Lab Test  03/19/18   0703  03/06/18   0702   NA  139  140 "   POTASSIUM  3.6  3.9   CHLORIDE  104  106   CO2  24  26   ANIONGAP  11  8   GLC  163*  105*   BUN  13  14   CR  0.62  0.61   ILIANA  9.0  8.9     Liver Function Studies -   Recent Labs   Lab Test  03/19/18   0703   PROTTOTAL  6.7*   ALBUMIN  2.9*   BILITOTAL  0.3   ALKPHOS  186*   AST  31   ALT  33       Results for KITTY CHAVIS (MRN [id number]) as of 3/17/2018 16:51   Ref. Range 1/15/2018 08:40 3/13/2018 09:20   Cancer Antigen 19-9 Latest Ref Range: 0 - 37 U/mL 12 10     RADIOLOGY:  We reviewed the CT report from 3/13/18 with the patient and provided a copy.     CT cap 3/13/18  1. Minimal decrease in size and marked decrease in enhancement and  pancreatic mass since the comparison study.  2. No significant change in low dense lesions in the liver since  comparison.    ASSESSMENT/PLAN:  Pancreatic tail adenocarcinoma: Kitty was diagnosed in December 2017 after presenting with pancreatitis. She is now s/p 4 cycles of neoadjuvant intent FOLFIRINOX. She had another bout of pancreatitis after the first cycle and neutropenia that delayed cycle 2. Oxaliplatin was reduced to 90% target dose with cycle 3 due to neuropathy.     Imaging now shows minimal decrease in size of the pancreatic mass. The small pulmonary nodules (largest 3mm) are stable. An abdominal MRI previously characterized several lesions as hepatic hemangiomas and these areas are unchanged now. Her baseline Ca 19-9 was normal at baseline and remains normal (12 --> 10). Overall she has stable disease.     She previously consulted with Dr. Byrd about surgical resection which would be distal pancreatectomy and splenectomy. She has follow-up with him next week and is tentatively scheduled for surgery on 4/17. She has received adequate neoadjuvant chemotherapy and we will hold on further treatment so she can recover from surgery.     She has lost significant weight and has already met with our nutritionist. We recommended that she schedule a follow-up  appointment. We referred her to cancer rehab to help improve her strength prior to surgery.     We will see her back about 4 weeks after surgery. At that point we will discuss adjuvant chemotherapy with gemcitabine/Xeloda per the ESPAC-4 study, to complete a total of 6 months of neoadjuvant plus adjuvant chemo.     Patient seen and discussed with staff Dr. Aleshia Ponce MD  Heme/Onc Fellow  Pager: [phone]      MEDICAL ONCOLOGY ATTENDING PHYSICIAN ADDENDUM:  I have seen and evaluated this patient with the medical oncology fellow. I have reviewed and edited this fellow's note, and agree with the assessment and plan stated above.     Ms. Bangura returns to the clinic today with her .  She has undergone 4 cycles of neoadjuvant-intent FOLFIRINOX.  She has had a quite difficult disease course leading up to and following diagnosis.  The most complicating issue is walled-off pancreatic necrosis which has been rather severe.  She had infected necrotizing pancreatitis requiring multiple endoscopic interventions.  Her pancreatitis has been severe enough that it has caused further issues and multiple hospitalizations since initiation of chemotherapy in mid January.  For example in late January, she was hospitalized for a week with nausea, vomiting and pain.  There were some diagnostic difficulties and interventional difficulties in terms of getting stents placed.  She had an ultrasound-guided cyst gastrostomy in late January by Dr. Metz.  In terms of chemotherapy complications, she has had incidences of neutropenia requiring growth factor support and also oxaliplatin-induced sensory neuropathy after just 1 or 2 cycles.  This has necessitated a 10% reduction of oxaliplatin dosing.  She has undergone 4 cycles thus far, the most recent of which was 03/06.  Her CT scan on 03/18 shows a minimal decrease in size.  There is decreased enhancement and some residual necrosis per my view.  She otherwise is in good  spirits today.  She had a recent bout of oral thrush which she is completing nystatin.  She has had some on and off abdominal pain that has resolved in recent weeks.  She has lost overall 30 pounds since initiation of chemotherapy and has been meeting with our nutritionist several times.      PHYSICAL EXAMINATION:  I personally performed the exam.  The abdomen was mildly tender in the right upper quadrant.  No palpable masses.  There is no evidence of hepatosplenomegaly.  Her lungs are rather clear despite the recent bout of bronchitis, which is resolving.  Cardiovascular, head and neck exam and lower extremity exam is otherwise unremarkable.  Her CA 19.9 is also unremarkable at 10.  It was 12 at baseline 01/15.        In summary, she has a cystic pancreatic tail adenocarcinoma that appears to be localized.  There are some small subcentimeter pulmonary nodules seen at the time of initial staging that remain unclear and may not be indicative of pulmonary metastasis, but it is too early to tell.  She is scheduled to meet Dr. Ja Byrd 1 week from today, but he was kindly agreeing to meet the patient today.  She is tentatively on the schedule for distal pancreatectomy on 04/17.  Assuming Dr. Byrd moves forward with surgery, we would hold chemotherapy as of today and see her back approximately 1 month following the surgery, which would be mid May.  We will refer her to cancer rehabilitation for PT and OT to increase her strength going into surgery.  I also requested that she meet up with Shivani Blas from Nutrition again to help improve nutrition further.  I also offered a Palliative Care consultation so she can get some supportive care to further improve her symptoms related to the cancer and chemotherapy, and she accepted that referral.  We will get all those scheduled today.           I spent 30 minutes in consultation, including H&P and Discussion. >50% of time was spent in counseling and in coordination of  care.    Jovany Monk MD, PhD

## 2018-03-19 NOTE — NURSING NOTE
"Oncology Rooming Note    March 19, 2018 7:13 AM   Kitty Bangura is a 57 year old female who presents for:    Chief Complaint   Patient presents with     Port Draw     Return visit related to Pancreatic Cancer     Oncology Clinic Visit     Initial Vitals: /71 (BP Location: Right arm, Patient Position: Sitting, Cuff Size: Adult Regular)  Pulse 77  Temp 97.7  F (36.5  C) (Oral)  Resp 16  Ht 1.651 m (5' 5\")  Wt 70.9 kg (156 lb 3.2 oz)  SpO2 98%  BMI 25.99 kg/m2 Estimated body mass index is 25.99 kg/(m^2) as calculated from the following:    Height as of this encounter: 1.651 m (5' 5\").    Weight as of this encounter: 70.9 kg (156 lb 3.2 oz). Body surface area is 1.8 meters squared.  No Pain (0) Comment: Data Unavailable   No LMP recorded. Patient is postmenopausal.  Allergies reviewed: Yes  Medications reviewed: Yes    Medications: Medication refills not needed today.  Pharmacy name entered into [facility]:    [id number] - [place], [place] - [address] [place] [address] [id number] - [place], [place] - [address] AT [facility] OF [address] [place] [address]    Clinical concerns: No new concerns. Provider was notified.    10 minutes for nursing intake (face to face time)     Es Seth LPN            "

## 2018-03-21 ENCOUNTER — HOME INFUSION (PRE-WILLOW HOME INFUSION) (OUTPATIENT)
Dept: PHARMACY | Facility: CLINIC | Age: 58
End: 2018-03-21

## 2018-03-22 ENCOUNTER — HOME INFUSION (PRE-WILLOW HOME INFUSION) (OUTPATIENT)
Dept: PHARMACY | Facility: CLINIC | Age: 58
End: 2018-03-22

## 2018-03-22 ENCOUNTER — HOSPITAL ENCOUNTER (OUTPATIENT)
Dept: PHYSICAL THERAPY | Facility: CLINIC | Age: 58
Setting detail: THERAPIES SERIES
End: 2018-03-22
Attending: INTERNAL MEDICINE
Payer: COMMERCIAL

## 2018-03-22 PROCEDURE — 40000360 ZZHC STATISTIC PT CANCER REHAB VISIT: Performed by: PHYSICAL THERAPIST

## 2018-03-22 PROCEDURE — G8985 CARRY GOAL STATUS: HCPCS | Mod: GP,CJ | Performed by: PHYSICAL THERAPIST

## 2018-03-22 PROCEDURE — G8984 CARRY CURRENT STATUS: HCPCS | Mod: GP,CK | Performed by: PHYSICAL THERAPIST

## 2018-03-22 PROCEDURE — 97535 SELF CARE MNGMENT TRAINING: CPT | Mod: GP | Performed by: PHYSICAL THERAPIST

## 2018-03-22 PROCEDURE — 97162 PT EVAL MOD COMPLEX 30 MIN: CPT | Mod: GP | Performed by: PHYSICAL THERAPIST

## 2018-03-22 PROCEDURE — 97110 THERAPEUTIC EXERCISES: CPT | Mod: GP | Performed by: PHYSICAL THERAPIST

## 2018-03-22 ASSESSMENT — 6 MINUTE WALK TEST (6MWT): TOTAL DISTANCE WALKED (METERS): 1644

## 2018-03-22 NOTE — PROGRESS NOTES
18 0800   General Information   Type of Visit Initial OP Ortho PT Evaluation   Start of Care Date 18   Referring Physician Jovany Monk   Patient/Family Goals Statement Get strength back, arms are really weak. Better energy level. Stamina.    Orders Evaluate and Treat   Orders Comment CA Rehab (to prepare for surgery)   Date of Order 18   Insurance Type [employer]   Insurance Comments/Visits Authorized Submit after eval.   Medical Diagnosis Pancreatic CA   Surgical/Medical history reviewed (Menopause, , Discectomy, Breast Reduction, Tubal Pr)   Precautions/Limitations no known precautions/limitations   General Information Comments Pancreatic enzymes, Nystatin.    Body Part(s)   Body Part(s) Lumbar Spine/SI;Cervical Spine   Presentation and Etiology   Pertinent history of current problem (include personal factors and/or comorbidities that impact the POC) Will be having surgery 18, and then will be going back into chemo. Had acute pancreatitic attack in  of , Found a cyst that was biopsied, which was cancerous. Have had 4 outpatient surgeries down the throat to remove the necrotizing cyst. Had to go back in and replace a couple of stents so that she could make it to the surgery. Has had 4 rounds of chemo. Has been walking on treatmill on home. Walking a mile at a time. Sometimes twice a day. Does stretching for legs, some yoga. The upcoming surgery will remove much of pancreas, taking the spleen, gallbladder.    Impairments F. Decreased strength and endurance;Q. Dizziness   Functional Limitations perform required work activities;perform desired leisure / sports activities  (Went to classes for yoga, walks, golfs. Suggested Sammy Chi. )   Symptom Location No Pain, Fatigue,    How/Where did it occur From insidious onset   Onset date of current episode/exacerbation 18  (MD Order date. )   Chronicity Chronic   Pain rating (0-10 point scale) Denies pain   Pain/symptoms are: Worse in the  "morning   Progression of symptoms since onset: Improved   Prior Level of Function   Functional Level Prior Comment Independent w/ADL's currently    Current Level of Function   Current Community Support Family/friend caregiver   Patient role/employment history A. Employed  ( in [place]. )   Living environment House/townhome   Home/community accessibility 1 step into house, and one level.    Fall Risk Screen   Fall screen completed by PT   Have you fallen 2 or more times in the past year? No   Have you fallen and had an injury in the past year? No   Timed Up and Go score (seconds) Walks quickly into dept.    Is patient a fall risk? No   System Outcome Measures   Outcome Measures (FACIT Score 22 )   Functional Scales   Functional Scales OPTIMAL   Optimal (1=able to do without difficulty, 2=able to do with little difficulty, 3=able to do with moderate difficulty, 4=able to do with much difficulty, 5=unable to do, 9=NA) Activity 1;Activity 2;Activity 3   Activity 1 comment FAtigue    Activity 2 comment 6' walk test - 1644 feet today   Activity 3 comment Decreased  on R and U/E weakness.    Vital Signs   Vital Signs Pulse;BP;SpO2;Other Vital Signs   Pulse 86   BP 84/58   SpO2 98 %   Other Vital Signs Measured before 6' walk test.    Lumbar Spine/SI Objective Findings   Observation No acute distress.    Integumentary Has port for chemo still in place.    Posture Head forward.    Gait/Locomotion 6' WAlk test - 1644 Feet    Balance/Proprioception (Single Leg Stance) SLS EO 10\" each; SLS EC R 10, L 8 seconds   Cervical Spine   Shoulder Abd (C5) Strength 5 B   Shoulder Add (C7) Strength 5 B   Shoulder ER (C5, C6) Strength R 4+, L 5-   Shoulder IR (C5, C6) Strength 5 B   Elbow Flexion (C5, C6) Strength 4+ B   Elbow Extension (C7) Strength R 4+, L 4   Shoulder/Wrist/Hand Strength Comments  Strength, Average of 3:  R 52, L 58    Planned Therapy Interventions   Planned Therapy Interventions Comment Aerobics, " Flexibility, Strengthening - CA Rehab Education    Clinical Impression   Criteria for Skilled Therapeutic Interventions Met yes, treatment indicated   PT Diagnosis Fatigue d/t Chemo Therapy, will be having surgery in April.    Influenced by the following impairments Decreased strength   Functional limitations due to impairments HH duties, Ability to do exercise classes.   Clinical Presentation Evolving/Changing   Clinical Presentation Rationale FACIT, 6' Walk test,  Strength, MMT, SLS, Hx of dizziness, upcoming surgical intervention in April    Clinical Decision Making (Complexity) Moderate complexity   Therapy Frequency 2 times/Week   Predicted Duration of Therapy Intervention (days/wks) 1 month, 8 visits.    Risk & Benefits of therapy have been explained Yes   Patient, Family & other staff in agreement with plan of care Yes   Clinical Impression Comments Fatigue d/t Chemo Therapy, will be having surgery in April.    Education Assessment   Preferred Learning Style Listening;Demonstration;Pictures/video   Barriers to Learning No barriers   ORTHO GOALS   PT Ortho Eval Goals 1;2;3;4   Ortho Goal 1   Goal Identifier 1   Goal Description STG: Pt will improve 6' walk test to 1750 feet or more for better aerobic capacity to prepare for surgery.    Target Date 04/16/18   Ortho Goal 2   Goal Identifier 2   Goal Description STG: Pt will improve strength of U/E by 1/2 grade for better ability to do HH duties.   Target Date 04/16/18   Ortho Goal 3   Goal Identifier 3   Goal Description STG: Pt will improve FACIT score to 30 or better for less fatigue and more involvement in HH duties and life activities.   Target Date 04/16/18   Ortho Goal 4   Goal Identifier 4   Goal Description LTG: Pt will be independent w/ Ex's and understand purpose of CA Rehab to continue after Rx's have been completed.    Target Date 04/16/18   Total Evaluation Time   Total Evaluation Time 60 Total (Eval x 35, Educ x 15, Ex x 10)    Lora Davis  PT, MTC (#[id number])  [facility]           [phone]  Fax          [phone]  Appt #      [phone]

## 2018-03-22 NOTE — PROGRESS NOTES
This is a recent snapshot of the patient's [facility] medical record.  For current drug dose and complete information and questions, call [phone] or In [facility] pool, [facility]v home infusion ([id number])  CSN Number:  [id number]

## 2018-03-22 NOTE — PROGRESS NOTES
CA Ex Flow Sheet  -  Pancreatic CA.     Date/Exercise   3/22/18         Bike   UBE          Treadmill   6' walk test         U/E Strengthening  1# Flex, Ext, Scap, Abd, 3# Biceps, Chair push ups        L/E Strengthening          Stretches for Flexibility.                                                                BP Limits: Systolic <90 or >200 / Diastolic < 65 or > 120 mm /Hg   HR Limits:  BPS.   O2 <88%    -    Glucose between 70 to 250.   WBC normal 5000-87849.  > 5000 ok for light ex progress to resistive.  < 5000 if has fever no ex unless cleared by MD  Hemoglobin:  10-12 low impact / low intesity aerobics/ activity  8-10 Monitor vitals.  Can do ROM, no aerobic activity  < 8 Red flag--discuss cont w/ MD  Platelets: no theraband if platelets variable.  Norm: 130,000 to 400,000  > 71945 resistive ex ok, light wts  30,000-50,000 walking, bike. No prolonged stretching  20,000 -30,000 AROM only, walking as tolerated  < 20,0000 AAROM, AROM (no reistive ex or antigravity)

## 2018-03-26 NOTE — PROGRESS NOTES
This is a recent snapshot of the patient's [facility] medical record.  For current drug dose and complete information and questions, call [phone] or In [facility] pool, fv home infusion ([id number])  CSN Number:  [id number]

## 2018-03-27 ENCOUNTER — ANESTHESIA EVENT (OUTPATIENT)
Dept: SURGERY | Facility: CLINIC | Age: 58
End: 2018-03-27

## 2018-03-28 ENCOUNTER — HOSPITAL ENCOUNTER (OUTPATIENT)
Dept: PHYSICAL THERAPY | Facility: CLINIC | Age: 58
Setting detail: THERAPIES SERIES
End: 2018-03-28
Attending: INTERNAL MEDICINE
Payer: COMMERCIAL

## 2018-03-28 PROCEDURE — 40000360 ZZHC STATISTIC PT CANCER REHAB VISIT: Performed by: PHYSICAL THERAPIST

## 2018-03-28 PROCEDURE — 97110 THERAPEUTIC EXERCISES: CPT | Mod: GP | Performed by: PHYSICAL THERAPIST

## 2018-03-28 ASSESSMENT — 6 MINUTE WALK TEST (6MWT): TOTAL DISTANCE WALKED (METERS): 1644

## 2018-03-30 ENCOUNTER — CARE COORDINATION (OUTPATIENT)
Dept: SURGERY | Facility: CLINIC | Age: 58
End: 2018-03-30

## 2018-03-30 ENCOUNTER — HOSPITAL ENCOUNTER (OUTPATIENT)
Dept: PHYSICAL THERAPY | Facility: CLINIC | Age: 58
Setting detail: THERAPIES SERIES
End: 2018-03-30
Attending: INTERNAL MEDICINE
Payer: COMMERCIAL

## 2018-03-30 DIAGNOSIS — C25.9 PANCREAS CANCER (H): Primary | ICD-10-CM

## 2018-03-30 PROCEDURE — 97110 THERAPEUTIC EXERCISES: CPT | Mod: GP | Performed by: PHYSICAL THERAPIST

## 2018-03-30 PROCEDURE — 40000360 ZZHC STATISTIC PT CANCER REHAB VISIT: Performed by: PHYSICAL THERAPIST

## 2018-03-30 ASSESSMENT — ENCOUNTER SYMPTOMS
PALPITATIONS: 0
EYE PAIN: 0
EYE WATERING: 0
DOUBLE VISION: 0
NAIL CHANGES: 1
TREMORS: 0
LIGHT-HEADEDNESS: 1
WEAKNESS: 0
NAUSEA: 0
SORE THROAT: 0
HYPERTENSION: 0
HEADACHES: 0
MEMORY LOSS: 0
HEMOPTYSIS: 0
SPEECH CHANGE: 0
HEARTBURN: 0
BOWEL INCONTINENCE: 0
BLOOD IN STOOL: 0
EXERCISE INTOLERANCE: 0
SINUS PAIN: 0
SKIN CHANGES: 0
ABDOMINAL PAIN: 0
HYPOTENSION: 1
LEG PAIN: 0
POOR WOUND HEALING: 0
TROUBLE SWALLOWING: 0
SLEEP DISTURBANCES DUE TO BREATHING: 0
RECTAL PAIN: 1
PARALYSIS: 0
SMELL DISTURBANCE: 0
JAUNDICE: 0
DIZZINESS: 1
SEIZURES: 0
EYE REDNESS: 0
ORTHOPNEA: 0
BLOATING: 0
DISTURBANCES IN COORDINATION: 0
DIARRHEA: 0
DYSPNEA ON EXERTION: 0
SPUTUM PRODUCTION: 1
TASTE DISTURBANCE: 0
COUGH: 0
VOMITING: 0
SYNCOPE: 0
HOARSE VOICE: 0
SHORTNESS OF BREATH: 0
WHEEZING: 0
NUMBNESS: 0
SNORES LOUDLY: 0
POSTURAL DYSPNEA: 0
NECK MASS: 0
EYE IRRITATION: 0
CONSTIPATION: 1
LOSS OF CONSCIOUSNESS: 0
COUGH DISTURBING SLEEP: 0
TINGLING: 0
SINUS CONGESTION: 0

## 2018-03-30 ASSESSMENT — 6 MINUTE WALK TEST (6MWT): TOTAL DISTANCE WALKED (METERS): 1644

## 2018-04-02 ENCOUNTER — ALLIED HEALTH/NURSE VISIT (OUTPATIENT)
Dept: SURGERY | Facility: CLINIC | Age: 58
End: 2018-04-02
Payer: COMMERCIAL

## 2018-04-02 ENCOUNTER — APPOINTMENT (OUTPATIENT)
Dept: SURGERY | Facility: CLINIC | Age: 58
End: 2018-04-02
Payer: COMMERCIAL

## 2018-04-02 ENCOUNTER — RADIANT APPOINTMENT (OUTPATIENT)
Dept: CT IMAGING | Facility: CLINIC | Age: 58
End: 2018-04-02
Payer: COMMERCIAL

## 2018-04-02 ENCOUNTER — OFFICE VISIT (OUTPATIENT)
Dept: SURGERY | Facility: CLINIC | Age: 58
End: 2018-04-02
Payer: COMMERCIAL

## 2018-04-02 ENCOUNTER — OFFICE VISIT (OUTPATIENT)
Dept: SURGERY | Facility: CLINIC | Age: 58
End: 2018-04-02
Attending: SURGERY
Payer: COMMERCIAL

## 2018-04-02 ENCOUNTER — APPOINTMENT (OUTPATIENT)
Dept: LAB | Facility: CLINIC | Age: 58
End: 2018-04-02
Payer: COMMERCIAL

## 2018-04-02 VITALS
DIASTOLIC BLOOD PRESSURE: 70 MMHG | RESPIRATION RATE: 18 BRPM | HEIGHT: 65 IN | BODY MASS INDEX: 26.85 KG/M2 | HEART RATE: 85 BPM | SYSTOLIC BLOOD PRESSURE: 102 MMHG | OXYGEN SATURATION: 98 % | TEMPERATURE: 97.5 F | WEIGHT: 161.16 LBS

## 2018-04-02 VITALS
OXYGEN SATURATION: 98 % | SYSTOLIC BLOOD PRESSURE: 103 MMHG | HEART RATE: 78 BPM | TEMPERATURE: 97.8 F | DIASTOLIC BLOOD PRESSURE: 70 MMHG | BODY MASS INDEX: 26.58 KG/M2 | WEIGHT: 159.7 LBS

## 2018-04-02 DIAGNOSIS — C25.9 PANCREAS CANCER (H): ICD-10-CM

## 2018-04-02 DIAGNOSIS — C25.9 MALIGNANT NEOPLASM OF PANCREAS, UNSPECIFIED LOCATION OF MALIGNANCY (H): Primary | ICD-10-CM

## 2018-04-02 DIAGNOSIS — Z01.818 PRE-OP EVALUATION: ICD-10-CM

## 2018-04-02 DIAGNOSIS — C25.9 PANCREATIC ADENOCARCINOMA (H): Primary | ICD-10-CM

## 2018-04-02 LAB
ALBUMIN SERPL-MCNC: 3.3 G/DL (ref 3.4–5)
ALP SERPL-CCNC: 109 U/L (ref 40–150)
ALT SERPL W P-5'-P-CCNC: 28 U/L (ref 0–50)
ANION GAP SERPL CALCULATED.3IONS-SCNC: 6 MMOL/L (ref 3–14)
AST SERPL W P-5'-P-CCNC: 30 U/L (ref 0–45)
BASOPHILS # BLD AUTO: 0.1 10E9/L (ref 0–0.2)
BASOPHILS NFR BLD AUTO: 0.9 %
BILIRUB SERPL-MCNC: 0.4 MG/DL (ref 0.2–1.3)
BUN SERPL-MCNC: 13 MG/DL (ref 7–30)
CALCIUM SERPL-MCNC: 9.1 MG/DL (ref 8.5–10.1)
CHLORIDE SERPL-SCNC: 110 MMOL/L (ref 94–109)
CO2 SERPL-SCNC: 25 MMOL/L (ref 20–32)
CREAT SERPL-MCNC: 0.52 MG/DL (ref 0.52–1.04)
DIFFERENTIAL METHOD BLD: ABNORMAL
EOSINOPHIL # BLD AUTO: 0.1 10E9/L (ref 0–0.7)
EOSINOPHIL NFR BLD AUTO: 1.1 %
ERYTHROCYTE [DISTWIDTH] IN BLOOD BY AUTOMATED COUNT: 18.7 % (ref 10–15)
GFR SERPL CREATININE-BSD FRML MDRD: >90 ML/MIN/1.7M2
GLUCOSE SERPL-MCNC: 95 MG/DL (ref 70–99)
HCT VFR BLD AUTO: 34.3 % (ref 35–47)
HGB BLD-MCNC: 11.1 G/DL (ref 11.7–15.7)
IMM GRANULOCYTES # BLD: 0 10E9/L (ref 0–0.4)
IMM GRANULOCYTES NFR BLD: 0.1 %
INR PPP: 0.99 (ref 0.86–1.14)
LYMPHOCYTES # BLD AUTO: 2.5 10E9/L (ref 0.8–5.3)
LYMPHOCYTES NFR BLD AUTO: 33.4 %
MCH RBC QN AUTO: 31.1 PG (ref 26.5–33)
MCHC RBC AUTO-ENTMCNC: 32.4 G/DL (ref 31.5–36.5)
MCV RBC AUTO: 96 FL (ref 78–100)
MONOCYTES # BLD AUTO: 1.3 10E9/L (ref 0–1.3)
MONOCYTES NFR BLD AUTO: 17.5 %
NEUTROPHILS # BLD AUTO: 3.5 10E9/L (ref 1.6–8.3)
NEUTROPHILS NFR BLD AUTO: 47 %
NRBC # BLD AUTO: 0 10*3/UL
NRBC BLD AUTO-RTO: 0 /100
PLATELET # BLD AUTO: 254 10E9/L (ref 150–450)
POTASSIUM SERPL-SCNC: 3.9 MMOL/L (ref 3.4–5.3)
PROT SERPL-MCNC: 7.2 G/DL (ref 6.8–8.8)
RBC # BLD AUTO: 3.57 10E12/L (ref 3.8–5.2)
SODIUM SERPL-SCNC: 141 MMOL/L (ref 133–144)
WBC # BLD AUTO: 7.5 10E9/L (ref 4–11)

## 2018-04-02 PROCEDURE — 90648 HIB PRP-T VACCINE 4 DOSE IM: CPT | Mod: ZF | Performed by: SURGERY

## 2018-04-02 PROCEDURE — 99215 OFFICE O/P EST HI 40 MIN: CPT | Mod: ZP | Performed by: SURGERY

## 2018-04-02 PROCEDURE — G0009 ADMIN PNEUMOCOCCAL VACCINE: HCPCS

## 2018-04-02 PROCEDURE — 90734 MENACWYD/MENACWYCRM VACC IM: CPT | Mod: ZF | Performed by: SURGERY

## 2018-04-02 PROCEDURE — 85610 PROTHROMBIN TIME: CPT | Performed by: SURGERY

## 2018-04-02 PROCEDURE — 90732 PPSV23 VACC 2 YRS+ SUBQ/IM: CPT | Mod: ZF | Performed by: SURGERY

## 2018-04-02 PROCEDURE — 36591 DRAW BLOOD OFF VENOUS DEVICE: CPT

## 2018-04-02 PROCEDURE — 90472 IMMUNIZATION ADMIN EACH ADD: CPT

## 2018-04-02 PROCEDURE — G0463 HOSPITAL OUTPT CLINIC VISIT: HCPCS | Mod: ZF

## 2018-04-02 PROCEDURE — 85025 COMPLETE CBC W/AUTO DIFF WBC: CPT | Performed by: SURGERY

## 2018-04-02 PROCEDURE — 96372 THER/PROPH/DIAG INJ SC/IM: CPT | Mod: ZF

## 2018-04-02 PROCEDURE — 80053 COMPREHEN METABOLIC PANEL: CPT | Performed by: SURGERY

## 2018-04-02 PROCEDURE — 25000128 H RX IP 250 OP 636: Mod: ZF | Performed by: SURGERY

## 2018-04-02 RX ORDER — IOPAMIDOL 755 MG/ML
97 INJECTION, SOLUTION INTRAVASCULAR ONCE
Status: COMPLETED | OUTPATIENT
Start: 2018-04-02 | End: 2018-04-02

## 2018-04-02 RX ORDER — SCOLOPAMINE TRANSDERMAL SYSTEM 1 MG/1
1 PATCH, EXTENDED RELEASE TRANSDERMAL ONCE
Status: CANCELLED | OUTPATIENT
Start: 2018-04-02 | End: 2018-04-02

## 2018-04-02 RX ORDER — HEPARIN SODIUM (PORCINE) LOCK FLUSH IV SOLN 100 UNIT/ML 100 UNIT/ML
5 SOLUTION INTRAVENOUS ONCE
Status: COMPLETED | OUTPATIENT
Start: 2018-04-02 | End: 2018-04-02

## 2018-04-02 RX ORDER — HEPARIN SODIUM (PORCINE) LOCK FLUSH IV SOLN 100 UNIT/ML 100 UNIT/ML
5 SOLUTION INTRAVENOUS ONCE
Status: ACTIVE | OUTPATIENT
Start: 2018-04-02

## 2018-04-02 RX ADMIN — SODIUM CHLORIDE, PRESERVATIVE FREE 5 ML: 5 INJECTION INTRAVENOUS at 11:38

## 2018-04-02 RX ADMIN — HAEMOPHILUS B POLYSACCHARIDE CONJUGATE VACCINE FOR INJ 0.5 ML: RECON SOLN at 15:20

## 2018-04-02 RX ADMIN — PNEUMOCOCCAL VACCINE POLYVALENT 0.5 ML
25; 25; 25; 25; 25; 25; 25; 25; 25; 25; 25; 25; 25; 25; 25; 25; 25; 25; 25; 25; 25; 25; 25 INJECTION, SOLUTION INTRAMUSCULAR; SUBCUTANEOUS at 15:23

## 2018-04-02 RX ADMIN — IOPAMIDOL 97 ML: 755 INJECTION, SOLUTION INTRAVASCULAR at 12:19

## 2018-04-02 RX ADMIN — NEISSERIA MENINGITIDIS GROUP A CAPSULAR POLYSACCHARIDE DIPHTHERIA TOXOID CONJUGATE ANTIGEN, NEISSERIA MENINGITIDIS GROUP C CAPSULAR POLYSACCHARIDE DIPHTHERIA TOXOID CONJUGATE ANTIGEN, NEISSERIA MENINGITIDIS GROUP Y CAPSULAR POLYSACCHARIDE DIPHTHERIA TOXOID CONJUGATE ANTIGEN, AND NEISSERIA MENINGITIDIS GROUP W-135 CAPSULAR POLYSACCHARIDE DIPHTHERIA TOXOID CONJUGATE ANTIGEN 0.5 ML: 4; 4; 4; 4 INJECTION, SOLUTION INTRAMUSCULAR at 15:25

## 2018-04-02 ASSESSMENT — LIFESTYLE VARIABLES: TOBACCO_USE: 1

## 2018-04-02 ASSESSMENT — PAIN SCALES - GENERAL: PAINLEVEL: NO PAIN (0)

## 2018-04-02 NOTE — MR AVS SNAPSHOT
After Visit Summary   2018    Kitty Bangura    MRN: [id number]           Patient Information     Date Of Birth          1960        Visit Information        Provider Department      2018 3:30 PM Rn, [facility]        Care Instructions    Preparing for Your Surgery      Name:  Kitty Bangura   MRN:  [id number]   :  1960   Today's Date:  2018     Arriving for surgery:  Surgery date: 18  Surgery time:  8:30 am  Arrival time:  6:00 am  Please come to:       [facility] [address]  [address] [place]  [address]    -   parking is available in front of the hospital from 5:15 am to 8:00 pm    -  Stop at the Information Desk in the lobby    -   Inform the information person that you are here for surgery. An escort to 3c will be provided. If you would not like an escort, please proceed to 3C on the 3rd floor. [phone]     What can I eat or drink?  -  You may have solid food or milk products until 8 hours prior to your surgery.  No food after midnight.  -  You may have wateror 7up/Sprite until 2 hours prior to your surgery. Stop clear fluid by 6:00 am.    Which medicines can I take? No Aspirin, vitamins or supplements for 7 days before surgery.  -  Do NOT take these medications the day of surgery: Creon    -  It is OKAY to take these medications on the morning of surgery is you usually take them in the morning: Albuterol inhaler. Zofran if needed, Norco or Dilaudid or Oxycodone if needed, Lorazepam if needed      How do I prepare myself?  -  Take two showers: one the night before surgery; and one the morning of surgery.         Use Scrubcare or Hibiclens to wash from neck down.  You may use your own shampoo and conditioner. No other hair products.   -  Do NOT use lotion, powder, deodorant, or antiperspirant the day of your surgery.  -  Do NOT wear any makeup, fingernail polish or jewelry.  -   Begin using Incentive Spirometer 1 week prior to surgery.  Use 4 times per day, up to 5-10 breaths each time.  Bring Incentive Spirometer to hospital.  -Do not bring your own medications to the hospital, except for inhalers.  -  Bring your ID and insurance card.    Questions or Concerns:  If you have questions or concerns, please call the  Preoperative Assessment Center, Monday-Friday 7AM-7PM:  [phone]    AFTER YOUR SURGERY  Breathing exercises   Breathing exercises help you recover faster. Take deep breaths and let the air out slowly. This will:     Help you wake up after surgery.    Help prevent complications like pneumonia.  Preventing complications will help you go home sooner.   We may give you a breathing device (incentive spirometer) to encourage you to breathe deeply.   Nausea and vomiting   You may feel sick to your stomach after surgery; if so, let your nurse know.    Pain control:  After surgery, you may have pain. Our goal is to help you manage your pain. Pain medicine will help you feel comfortable enough to do activities that will help you heal.  These activities may include breathing exercises, walking and physical therapy.   To help your health care team treat your pain we will ask: 1) If you have pain  2) where it is located 3) describe your pain in your words  Methods of pain control include medications given by mouth, vein or by nerve block for some surgeries.  We may give you a pain control pump that will:  1) Deliver the medicine through a tube placed in your vein  2) Control the amount of medicine you receive  3) Allow you to push a button to deliver a dose of pain medicine  Sequential Compression Device (SCD) or Pneumo Boots:  You may need to wear SCD S on your legs or feet. These are wraps connected to a machine that pumps in air and releases it. The repeated pumping helps prevent blood clots from forming.     Using an Incentive Spirometer    An incentive spirometer is a device that  helps you do deep breathing exercises. These exercises expand your lungs, aid in circulation, and help prevent pneumonia. Deep breathing exercises also help you breathe better and improve the function of your lungs by:    Keeping your lungs clear    Strengthening your breathing muscles    Helping prevent respiratory complications or problems  The incentive spirometer gives you a way to take an active part in recover. A nurse or therapist will teach you breathing exercises. To do these exercises, you will breathe in through your mouth and not your nose. The incentive spirometer only works correctly if you breathe in through your mouth.  Steps to clear lungs  Step 1. Exhale normally. Then, inhale normally.    Relax and breathe out.  Step 2. Place your lips tightly around the mouthpiece.    Make sure the device is upright and not tilted.  Step 3. Inhale as much air as you can through the mouthpiece (don't breath through your nose).    Inhale slowly and deeply.    Hold your breath long enough to keep the balls or disk raised for at least 3 to 5 seconds, or as instructed by your healthcare provider.    Some spirometers have an indicator to let you know that you are breathing in too fast. If the indicator goes off, breathe in more slowly.  Step 4. Repeat the exercise regularly.    Do this exercise every hour while you're awake, or as instructed by your healthcare provider.    If you were taught deep breathing and coughing exercises, do them regularly as instructed by your healthcare provider.   Follow-up care  Make a follow up appointment, or as directed. Also, follow up with your healthcare provider as advised or if your symptoms do not improve or continue to get worse.  When to call your healthcare provider  Call your healthcare provider right away if you have any of the following:    Fever 100.4  (38 C) or higher, or as directed by your healthcare provider    Brownish, bloody, or smelly sputum  Call 911  Call 911 if  any of these occur:     Shortness of breath that doesn't get better after taking your medicine    Cool, moist, pale or blue skin    Trouble breathing or swallowing, wheezing    Fainting or loss of consciousness    Feeling of fizziness or weakness or a sudden drop in blood pressure    Feeling of doom or lightheaded    Chest pain or rapid heart rate  Date Last Reviewed: 1/1/2017 2000-2017 [employer]. [address] [place] [address]. All rights reserved. This information is not intended as a substitute for professional medical care. Always follow your healthcare professional's instructions.                          Follow-ups after your visit        Your next 10 appointments already scheduled     Apr 02, 2018  4:30 PM CDT   LAB with [facility] ([facility])    [address] [place] [address] [phone]           Please do not eat 10-12 hours before your appointment if you are coming in fasting for labs on lipids, cholesterol, or glucose (sugar). This does not apply to pregnant women. Water, hot tea and black coffee (with nothing added) are okay. Do not drink other fluids, diet soda or chew gum.            Apr 04, 2018  9:30 AM CDT   Cancer Rehab Treatment with Lora Davis PT   [facility])    [address]  [facility] [address]   [phone]            Apr 06, 2018  9:30 AM CDT   Cancer Rehab Treatment with Lora Davis PT   [facility])    [address]  [facility] [address]   [phone]            Apr 09, 2018 11:00 AM CDT   Cancer Rehab Treatment with Lora Davis PT   [facility] ([facility])    [address]  [address]  [facility] [address]   [phone]            Apr 11, 2018  3:00 PM CDT   Cancer Rehab Treatment with Lora Davis PT    [facility] ([facility])    [address]  [address]  [facility] [address]   [phone]            Apr 17, 2018   Procedure with Ja Byrd MD   [facility] Same Day Surgery (--)    [address]  [place] [address]   [phone]            May 04, 2018  9:00 AM CDT   (Arrive by 8:45 AM)   New Patient Visit with Brandyn Snow MD   [facility] ([facility])    [address] [place] [address]  [address]  [facility] [id number] [phone]            May 14, 2018  7:45 AM CDT   (Arrive by 7:30 AM)   Return Visit with Jovany Monk MD   [facility] ([facility])    [address] [place] [address]  [address]  [facility] [address] [phone]              Who to contact     Please call your clinic at [phone] to:    Ask questions about your health    Make or cancel appointments    Discuss your medicines    Learn about your test results    Speak to your doctor            Additional Information About Your Visit        [employer] Information     [employer] gives you secure access to your electronic health record. If you see a primary care provider, you can also send messages to your care team and make appointments. If you have questions, please call your primary care clinic.  If you do not have a primary care provider, please call [phone] and they will assist you.      [employer] is an electronic gateway that provides easy, online access to your medical records. With [employer], you can request a clinic appointment, read your test results, renew a prescription or communicate with your care team.     To access your existing account, please contact your [facility] or call [phone] for assistance.        Care EveryWhere ID     This is your Care EveryWhere ID. This could be used by other organizations to access your [facility] medical records  [id number]          Blood Pressure from Last 3 Encounters:   04/02/18 102/70   04/02/18 103/70   03/19/18 103/71    Weight from Last 3 Encounters:   04/02/18 73.1 kg (161 lb 2.5 oz)   04/02/18 72.4 kg (159 lb 11.2 oz)   03/19/18 70.9 kg (156 lb 3.2 oz)              Today, you had the following     No orders found for display       Primary Care Provider Office Phone # Fax #    Deejayshari Julito Mcgill [phone] [address] [facility] [address]        Equal Access to Services     [facility] : Hadii zaheer Ruiz, waivy rasmussen, larisa graffmafredy ferguson, re egrardo . So [facility] [phone].    ATENCIÓN: Si habla español, tiene a quiros disposición servicios gratuitos de asistencia lingüística. [facility] [phone].    We comply with applicable federal civil rights laws and [place] laws. We do not discriminate on the basis of race, color, national origin, age, disability, sex, sexual orientation, or gender identity.            Thank you!     Thank you for choosing [facility]  for your care. Our goal is always to provide you with excellent care. Hearing back from our patients is one way we can continue to improve our services. Please take a few minutes to complete the written survey that you may receive in the mail after your visit with us. Thank you!             Your Updated Medication List - Protect others around you: Learn how to safely use, store and throw away your medicines at www.disposemymeds.org.          This list is accurate as of 4/2/18  4:16 PM.  Always use your most recent med list.                   Brand Name Dispense Instructions for use Diagnosis    albuterol 108 (90 BASE) MCG/ACT Inhaler    PROAIR HFA/PROVENTIL HFA/VENTOLIN HFA    1 Inhaler    Inhale 2 puffs into the lungs every 6 hours as needed for shortness of breath / dyspnea or wheezing    Acute bronchitis, unspecified organism       amylase-lipase-protease  [address] UNITS Cpep per EC capsule    CREON    450 capsule    Take 2-3 with meals / 1-2 with snacks, up to 15 per day.    Necrotizing pancreatitis       dexamethasone 4 MG tablet    DECADRON    12 tablet    Take 2 tablets (8 mg) by mouth daily (with breakfast)    Pancreatic adenocarcinoma (H)       HYDROcodone-acetaminophen 5-325 MG per tablet    NORCO     every 4 hours as needed        HYDROmorphone 2 MG tablet    DILAUDID    25 tablet    Take 1 tablet (2 mg) by mouth every 4 hours as needed for pain maximum 4 tablet(s) per day        LORazepam 0.5 MG tablet    ATIVAN    30 tablet    Take 1 tablet (0.5 mg) by mouth every 4 hours as needed (Anxiety, Nausea/Vomiting or Sleep)    Pancreatic adenocarcinoma (H)       ondansetron 8 MG tablet    ZOFRAN    30 tablet    Take 1 tablet (8 mg) by mouth every 8 hours as needed for nausea    Pancreatic adenocarcinoma (H)       oxyCODONE IR 5 MG tablet    ROXICODONE    30 tablet    Take 1 tablet (5 mg) by mouth every 4 hours as needed for moderate to severe pain    Necrotizing pancreatitis, Pancreatic adenocarcinoma (H)       prochlorperazine 10 MG tablet    COMPAZINE    30 tablet    Take 1 tablet (10 mg) by mouth every 6 hours as needed (Nausea/Vomiting)    Pancreatic adenocarcinoma (H)

## 2018-04-02 NOTE — LETTER
4/2/2018       RE: Kitty Bangura  [address] [address] [address]     Dear Colleague,    Thank you for referring your patient, Kitty Bangura, to the [facility]. Please see a copy of my visit note below.    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  She was found to the pancreatic tail mass at that time which was biopsied and revealed adenocarcinoma.  She required multiple endoscopic procedures and debridements of her pancreatic necrosis.  This was eventually controlled with a transgastric drain as well as a pancreatic duct stent.  She is received a total of 4 cycles of FOLFIRINOX.  Radiographically, her tumor has become much more fluid-filled and the solid components are much less prominent.    She reports that she feels as healthy as prior to developing the pancreatitis.  Since her last dose of chemo her appetite and strength have quickly recovered.  She has no significant pain at this time.  She is eating well and maintaining her weight.    Past Medical History and Past Surgical History remain unchanged from her initial consultation, except as outlined above.    /70 (BP Location: Right arm, Patient Position: Chair, Cuff Size: Adult Regular)  Pulse 78  Temp 97.8  F (36.6  C) (Oral)  Wt 72.4 kg (159 lb 11.2 oz)  SpO2 98%  BMI 26.58 kg/m2   Physical Exam  General: No acute distress, appears strong, ambulatory in clinic.  Head: Normocephalic, anicteric sclera.  Neck: No cervical or supraclavicular adenopathy.  Pulmonary: Bilateral chest rise.  Cardiac: Regular rate and rhythm.  Abdomen: No abdominal scars.  Extremities: No significant lower extremity edema.    INVESTIGATIONS:  Labs: Creatinine 0.5,  "glucose 95, albumin 3.3 which is up from 2.9, total bilirubin 0.4, alkaline phosphatase 109, white blood cell count 7.5, hemoglobin 11, platelet count 254.  Last CA-19-9 on March 13, 2018 was 10.    CT (April 2, 2018): This is my interpretation as images not yet been reviewed by radiology: There is a 3.5 cm mass within the body/tail the pancreas which is more fluid-filled than her previous scans.  The solid component of this mass has definitely decreased.  She has stable liver lesions were felt to be benign/hemangiomas on MRI at the time of diagnosis.  She has a replaced right hepatic artery off of the SMA.  The left hepatic artery and GDA come off of the \"common hepatic\" artery.  She has type I portal venous anatomy.  The coronary vein drains into the proximal splenic vein.  The inferior mesenteric vein joins a colic branch before draining into the superior mesenteric vein.  She has a pancreatic duct stent is still in place.  She has a transgastric drain in place that was in a previous fluid collection, which is largely resolved.  There is an area of the pancreas that would correspond to the usual location of the neck/body which does not appear to have much viable remaining tissue.  It is possible that her pancreatic duct is disconnected at this site.    Biopsy: From December 2017 revealed adenocarcinoma    ASSESSMENT/PLAN:  Kitty Bangura is a 57 year old female with resectable pancreatic adenocarcinoma, in the setting of necrotizing pancreatitis that required multiple endoscopic treatments.  She is now completed 4 cycles of FOLFIRINOX.    We reviewed her imaging and I used a diagram to depict the location of her tumor, the area of pancreatic necrosis, transgastric drain, pancreatic duct stent, and important surrounding structures.  I explained that she will need a distal pancreatectomy and splenectomy to remove her pancreatic cancer.  She will also need a cholecystectomy and possibly a partial gastrectomy to " close the defect left by the transgastric drain.  We discussed potential risks, including but not limited to pancreatic fistula, bleeding, infection, stroke, heart attack, pneumonia, VTE, development of diabetes, pancreatic insufficiency, etc.  She asked appropriate questions, voiced understanding, and agreed to proceed with surgery.  She will receive her first splenectomy vaccinations in clinic today.  She is on the operating room schedule for 2 weeks from now.    Total time spent with the patient was 50 minutes, of which more than half was counseling.     Ja Byrd MD    Division of Surgical Oncology  [facility]

## 2018-04-02 NOTE — NURSING NOTE
"Oncology Rooming Note    April 2, 2018 1:49 PM   Kitty Bangura is a 57 year old female who presents for:    Chief Complaint   Patient presents with     Port Draw     labs drawn via port by RN     Oncology Clinic Visit     Pancreatic CA     Initial Vitals: /70 (BP Location: Right arm, Patient Position: Chair, Cuff Size: Adult Regular)  Pulse 78  Temp 97.8  F (36.6  C) (Oral)  Wt 72.4 kg (159 lb 11.2 oz)  SpO2 98%  BMI 26.58 kg/m2 Estimated body mass index is 26.58 kg/(m^2) as calculated from the following:    Height as of 3/19/18: 1.651 m (5' 5\").    Weight as of this encounter: 72.4 kg (159 lb 11.2 oz). Body surface area is 1.82 meters squared.  No Pain (0) Comment: Data Unavailable   No LMP recorded. Patient is postmenopausal.  Allergies reviewed: Yes  Medications reviewed: Yes    Medications: Medication refills not needed today.  Pharmacy name entered into [facility]:    [id number] - [place], [place] - [address] [place] [address] [id number] - [place], [place] - [address] AT [facility] OF [address] [place] [address]    Clinical concerns: Pt is here to discuss surgery. Dr Byrd was notified.    8 minutes for nursing intake (face to face time)     Ewelina Rodriguez CMA              "

## 2018-04-02 NOTE — PROGRESS NOTES
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  She was found to the pancreatic tail mass at that time which was biopsied and revealed adenocarcinoma.  She required multiple endoscopic procedures and debridements of her pancreatic necrosis.  This was eventually controlled with a transgastric drain as well as a pancreatic duct stent.  She is received a total of 4 cycles of FOLFIRINOX.  Radiographically, her tumor has become much more fluid-filled and the solid components are much less prominent.    She reports that she feels as healthy as prior to developing the pancreatitis.  Since her last dose of chemo her appetite and strength have quickly recovered.  She has no significant pain at this time.  She is eating well and maintaining her weight.    Past Medical History and Past Surgical History remain unchanged from her initial consultation, except as outlined above.    /70 (BP Location: Right arm, Patient Position: Chair, Cuff Size: Adult Regular)  Pulse 78  Temp 97.8  F (36.6  C) (Oral)  Wt 72.4 kg (159 lb 11.2 oz)  SpO2 98%  BMI 26.58 kg/m2   Physical Exam  General: No acute distress, appears strong, ambulatory in clinic.  Head: Normocephalic, anicteric sclera.  Neck: No cervical or supraclavicular adenopathy.  Pulmonary: Bilateral chest rise.  Cardiac: Regular rate and rhythm.  Abdomen: No abdominal scars.  Extremities: No significant lower extremity edema.    INVESTIGATIONS:  Labs: Creatinine 0.5, glucose 95, albumin 3.3 which is up from 2.9, total bilirubin 0.4, alkaline phosphatase 109, white blood cell count 7.5, hemoglobin 11, platelet count 254.  Last CA-19-9 on March 13, 2018 was 10.    CT (April 2, 2018): This is my  "interpretation as images not yet been reviewed by radiology: There is a 3.5 cm mass within the body/tail the pancreas which is more fluid-filled than her previous scans.  The solid component of this mass has definitely decreased.  She has stable liver lesions were felt to be benign/hemangiomas on MRI at the time of diagnosis.  She has a replaced right hepatic artery off of the SMA.  The left hepatic artery and GDA come off of the \"common hepatic\" artery.  She has type I portal venous anatomy.  The coronary vein drains into the proximal splenic vein.  The inferior mesenteric vein joins a colic branch before draining into the superior mesenteric vein.  She has a pancreatic duct stent is still in place.  She has a transgastric drain in place that was in a previous fluid collection, which is largely resolved.  There is an area of the pancreas that would correspond to the usual location of the neck/body which does not appear to have much viable remaining tissue.  It is possible that her pancreatic duct is disconnected at this site.    Biopsy: From December 2017 revealed adenocarcinoma    ASSESSMENT/PLAN:  Kitty Bangura is a 57 year old female with resectable pancreatic adenocarcinoma, in the setting of necrotizing pancreatitis that required multiple endoscopic treatments.  She is now completed 4 cycles of FOLFIRINOX.    We reviewed her imaging and I used a diagram to depict the location of her tumor, the area of pancreatic necrosis, transgastric drain, pancreatic duct stent, and important surrounding structures.  I explained that she will need a distal pancreatectomy and splenectomy to remove her pancreatic cancer.  She will also need a cholecystectomy and possibly a partial gastrectomy to close the defect left by the transgastric drain.  We discussed potential risks, including but not limited to pancreatic fistula, bleeding, infection, stroke, heart attack, pneumonia, VTE, development of diabetes, pancreatic " insufficiency, etc.  She asked appropriate questions, voiced understanding, and agreed to proceed with surgery.  She will receive her first splenectomy vaccinations in clinic today.  She is on the operating room schedule for 2 weeks from now.    Total time spent with the patient was 50 minutes, of which more than half was counseling.     Ja Byrd MD    Division of Surgical Oncology  [facility]

## 2018-04-02 NOTE — PATIENT INSTRUCTIONS
Preparing for Your Surgery      Name:  Kitty Bangura   MRN:  [id number]   :  1960   Today's Date:  2018     Arriving for surgery:  Surgery date: 18  Surgery time:  8:30 am  Arrival time:  6:00 am  Please come to:       [facility] [address]  [address] [place]  [address]    -   parking is available in front of the hospital from 5:15 am to 8:00 pm    -  Stop at the Information Desk in the lobby    -   Inform the information person that you are here for surgery. An escort to 3c will be provided. If you would not like an escort, please proceed to 3C on the 3rd floor. [phone]     What can I eat or drink?  -  You may have solid food or milk products until 8 hours prior to your surgery.  No food after midnight.  -  You may have wateror 7up/Sprite until 2 hours prior to your surgery. Stop clear fluid by 6:00 am.    Which medicines can I take? No Aspirin, vitamins or supplements for 7 days before surgery.  -  Do NOT take these medications the day of surgery: Creon    -  It is OKAY to take these medications on the morning of surgery is you usually take them in the morning: Albuterol inhaler. Zofran if needed, Norco or Dilaudid or Oxycodone if needed, Lorazepam if needed      How do I prepare myself?  -  Take two showers: one the night before surgery; and one the morning of surgery.         Use Scrubcare or Hibiclens to wash from neck down.  You may use your own shampoo and conditioner. No other hair products.   -  Do NOT use lotion, powder, deodorant, or antiperspirant the day of your surgery.  -  Do NOT wear any makeup, fingernail polish or jewelry.  -  Begin using Incentive Spirometer 1 week prior to surgery.  Use 4 times per day, up to 5-10 breaths each time.  Bring Incentive Spirometer to hospital.  -Do not bring your own medications to the hospital, except for inhalers.  -  Bring your ID and insurance card.    Questions or Concerns:  If you have  questions or concerns, please call the  Preoperative Assessment Center, Monday-Friday 7AM-7PM:  [phone]    AFTER YOUR SURGERY  Breathing exercises   Breathing exercises help you recover faster. Take deep breaths and let the air out slowly. This will:     Help you wake up after surgery.    Help prevent complications like pneumonia.  Preventing complications will help you go home sooner.   We may give you a breathing device (incentive spirometer) to encourage you to breathe deeply.   Nausea and vomiting   You may feel sick to your stomach after surgery; if so, let your nurse know.    Pain control:  After surgery, you may have pain. Our goal is to help you manage your pain. Pain medicine will help you feel comfortable enough to do activities that will help you heal.  These activities may include breathing exercises, walking and physical therapy.   To help your health care team treat your pain we will ask: 1) If you have pain  2) where it is located 3) describe your pain in your words  Methods of pain control include medications given by mouth, vein or by nerve block for some surgeries.  We may give you a pain control pump that will:  1) Deliver the medicine through a tube placed in your vein  2) Control the amount of medicine you receive  3) Allow you to push a button to deliver a dose of pain medicine  Sequential Compression Device (SCD) or Pneumo Boots:  You may need to wear SCD S on your legs or feet. These are wraps connected to a machine that pumps in air and releases it. The repeated pumping helps prevent blood clots from forming.     Using an Incentive Spirometer    An incentive spirometer is a device that helps you do deep breathing exercises. These exercises expand your lungs, aid in circulation, and help prevent pneumonia. Deep breathing exercises also help you breathe better and improve the function of your lungs by:    Keeping your lungs clear    Strengthening your breathing muscles    Helping prevent  respiratory complications or problems  The incentive spirometer gives you a way to take an active part in recover. A nurse or therapist will teach you breathing exercises. To do these exercises, you will breathe in through your mouth and not your nose. The incentive spirometer only works correctly if you breathe in through your mouth.  Steps to clear lungs  Step 1. Exhale normally. Then, inhale normally.    Relax and breathe out.  Step 2. Place your lips tightly around the mouthpiece.    Make sure the device is upright and not tilted.  Step 3. Inhale as much air as you can through the mouthpiece (don't breath through your nose).    Inhale slowly and deeply.    Hold your breath long enough to keep the balls or disk raised for at least 3 to 5 seconds, or as instructed by your healthcare provider.    Some spirometers have an indicator to let you know that you are breathing in too fast. If the indicator goes off, breathe in more slowly.  Step 4. Repeat the exercise regularly.    Do this exercise every hour while you're awake, or as instructed by your healthcare provider.    If you were taught deep breathing and coughing exercises, do them regularly as instructed by your healthcare provider.   Follow-up care  Make a follow up appointment, or as directed. Also, follow up with your healthcare provider as advised or if your symptoms do not improve or continue to get worse.  When to call your healthcare provider  Call your healthcare provider right away if you have any of the following:    Fever 100.4  (38 C) or higher, or as directed by your healthcare provider    Brownish, bloody, or smelly sputum  Call 911  Call 911 if any of these occur:     Shortness of breath that doesn't get better after taking your medicine    Cool, moist, pale or blue skin    Trouble breathing or swallowing, wheezing    Fainting or loss of consciousness    Feeling of fizziness or weakness or a sudden drop in blood pressure    Feeling of doom or  lightheaded    Chest pain or rapid heart rate  Date Last Reviewed: 1/1/2017 2000-2017 [employer]. [address] [facility], [place] [address]. All rights reserved. This information is not intended as a substitute for professional medical care. Always follow your healthcare professional's instructions.

## 2018-04-02 NOTE — NURSING NOTE
"Oncology Rooming Note    April 2, 2018 1:36 PM   Kitty Bagnura is a 57 year old female who presents for:    Chief Complaint   Patient presents with     Port Draw     labs drawn via port by RN     Oncology Clinic Visit     Pancreatic CA     Initial Vitals: /70 (BP Location: Right arm, Patient Position: Chair, Cuff Size: Adult Regular)  Pulse 78  Temp 97.8  F (36.6  C) (Oral)  Wt 72.4 kg (159 lb 11.2 oz)  SpO2 98%  BMI 26.58 kg/m2 Estimated body mass index is 26.58 kg/(m^2) as calculated from the following:    Height as of 3/19/18: 1.651 m (5' 5\").    Weight as of this encounter: 72.4 kg (159 lb 11.2 oz). Body surface area is 1.82 meters squared.  No Pain (0) Comment: Data Unavailable   No LMP recorded. Patient is postmenopausal.  Allergies reviewed: Yes  Medications reviewed: Yes    Medications: Medication refills not needed today.  Pharmacy name entered into ahoyDoc:    [facility] - [place], [place] - [address] [place] [address] [id number] - [place], [place] - [address] AT [facility] OF [address] [place] [address]    Clinical concerns: Patient states there are no new concerns to discuss with provider.  Dr Byrd was not notified.       3 minutes for nursing intake (face to face time)     Ewelina Rodriguez CMA              "

## 2018-04-02 NOTE — ANESTHESIA PREPROCEDURE EVALUATION
Anesthesia Evaluation     . Pt has had prior anesthetic. Type: General    No history of anesthetic complications          ROS/MED HX    ENT/Pulmonary:  - neg pulmonary ROS   (+)tobacco use, Past use light smoker in past packs/day  , . .    Neurologic:  - neg neurologic ROS     Cardiovascular:  - neg cardiovascular ROS   (+) ----. : . . . :. . No previous cardiac testing      (-) hypertension, CAD, taking anticoagulants/antiplatelets and dyslipidemia   METS/Exercise Tolerance: Comment: Walks at 3 MPH.  Close to 2 miles and upper arm exercises.  >4 METS   Hematologic:  - neg hematologic  ROS       Musculoskeletal:  - neg musculoskeletal ROS       GI/Hepatic: Comment: Necrotizing pancreatitis  Found to have pancreatic adenocarcinoma        Renal/Genitourinary:  - ROS Renal section negative       Endo:  - neg endo ROS       Psychiatric:  - neg psychiatric ROS       Infectious Disease:  - neg infectious disease ROS       Malignancy:   (+) Malignancy History of GI  GI CA Active status post,         Other:    (+) No chance of pregnancy no H/O Chronic Pain,                   Physical Exam  Normal systems: cardiovascular and pulmonary    Airway   Mallampati: I  TM distance: >3 FB  Neck ROM: full    Dental   (+) missing    Cardiovascular   Rhythm and rate: regular and normal      Pulmonary    breath sounds clear to auscultation    Other findings:   4/2/2018 11:49  Sodium: 141  Potassium: 3.9  Chloride: 110 (H)  Carbon Dioxide: 25  Urea Nitrogen: 13  Creatinine: 0.52  GFR Estimate: >90  GFR Estimate If Black: >90  Calcium: 9.1  Anion Gap: 6  Albumin: 3.3 (L)  Protein Total: 7.2  Bilirubin Total: 0.4  Alkaline Phosphatase: 109  ALT: 28  AST: 30  Glucose: 95    WBC: 7.5  Hemoglobin: 11.1 (L)  Hematocrit: 34.3 (L)  Platelet Count: 254  RBC Count: 3.57 (L)  MCV: 96  MCH: 31.1  MCHC: 32.4  RDW: 18.7 (H)  Diff Method: Automated Method  % Neutrophils: 47.0  % Lymphocytes: 33.4  % Monocytes: 17.5  % Eosinophils: 1.1  % Basophils:  0.9  % Immature Granulocytes: 0.1  Nucleated RBCs: 0  Absolute Neutrophil: 3.5  Absolute Lymphocytes: 2.5  Absolute Monocytes: 1.3  Absolute Eosinophils: 0.1  Absolute Basophils: 0.1  Abs Immature Granulocytes: 0.0  Absolute Nucleated RBC: 0.0  INR: 0.99           PAC Discussion and Assessment    ASA Classification: 3  Case is suitable for: [place]  Anesthetic techniques and relevant risks discussed: GA with regional block for post-op pain control and GA  Invasive monitoring and risk discussed: Yes  Types:   Possibility and Risk of blood transfusion discussed: Yes  NPO instructions given:   Additional anesthetic preparation and risks discussed:   Needs early admission to pre-op area:   Other:     PAC Resident/NP Anesthesia Assessment:  Scheduled for Diagnostic Laparoscopy; Open Distal Pancreatectomy And Splenectomy on 4/17/18 by Dr. Byrd and Blayne in treatment of pancreatic cancer.  PAC referral for risk assessment and optimization for anesthesia with comorbid conditions of:    **right chest port  Patient is requesting # 6 ETT (larger size caused sore throat)    Pre-operative considerations:  1.  Cardiac:  Functional status very good.  Attending therapy.  Uses treadmill almost 2 miles at 3 MPH. 0.9%  risk of major adverse cardiac event.    2.  Pulm:  Airway feasible.  IAN risk:  Low.  Non-smoker.  Has albuterol but not using.   01/25/18; 1056; Mask Ventilation: Easy; Ease of Intubation: Easy; Airway Size: 6;  Cuffed;  Oral;  Blade Type: Conroy;  Blade Size: 2;  Place by: Breanna;  Insertion Attempts: 1;  Secured at (cm)to lip: 21 cm;  Breath Sounds: Equal, clear and bilateral;  End Tidal CO2: Present;  Dentition: Intact, Unchanged;  Grade View of Cords: 1  3.  GI:  Risk of PONV score = 4.  If 3 or > anti-emetic intervention recommended (with 2 or more meds).  Scopolamine patch ordered (per patient request).  She also requests Zofran.   4.  Onc:  Pancreatic cancer s/p chemo.  Has right chest port.         Patient is optimized and is acceptable candidate for the proposed procedure.  No further diagnostic evaluation is needed.     Discussed with Dr. Howell.           Reviewed and Signed by PAC Mid-Level Provider/Resident  Mid-Level Provider/Resident: Venessa Manrique PA-C  Date: 4/2/18  Time: 1546    Attending Anesthesiologist Anesthesia Assessment:        Anesthesiologist:   Date:   Time:   Pass/Fail:   Disposition:     PAC Pharmacist Assessment:        Pharmacist:   Date:   Time:                           .

## 2018-04-02 NOTE — NURSING NOTE
Pneumovax 0.5Ml IM L Deltoid \Acthib 0.5Ml  IM  R deltoid   \   Menactra 0.5ML IM  R Sena Flynn MA

## 2018-04-02 NOTE — MR AVS SNAPSHOT
After Visit Summary   4/2/2018    Kitty Bangura    MRN: [id number]           Patient Information     Date Of Birth          1960        Visit Information        Provider Department      4/2/2018 1:30 PM Ja Byrd MD [facility]        Today's Diagnoses     Pancreatic adenocarcinoma (H)    -  1       Follow-ups after your visit        Your next 10 appointments already scheduled     Apr 06, 2018  9:30 AM CDT   Cancer Rehab Treatment with Lora Davis PT   [facility] ([facility])    [address]  [address]  [facility] [address]   [phone]            Apr 09, 2018 11:00 AM CDT   Cancer Rehab Treatment with Lora Davis PT   [facility] ([facility])    [address]  [address]  [facility] [address]   [phone]            Apr 11, 2018  3:00 PM CDT   Cancer Rehab Treatment with Lora Davis PT   [facility] ([facility])    [address]  [address]  [facility] [address]   [phone]            Apr 17, 2018   Procedure with Ja Byrd MD   [facility], Same Day Surgery (--)    [address] [facility] [address]   [phone]            May 04, 2018  9:00 AM CDT   (Arrive by 8:45 AM)   New Patient Visit with Brandyn Snow MD   [facility] ([facility])    [address] [place] [address]  [address]  [facility] [address] [phone]            May 14, 2018  7:45 AM CDT   (Arrive by 7:30 AM)   Return Visit with Jovany Monk MD   [facility] ([facility])    [address] [place] [address]  [address]  [facility] [id number] [phone]              Who to contact     If you have questions or need follow up information about today's clinic visit or your schedule please contact [facility] directly at [phone].  Normal or non-critical  lab and imaging results will be communicated to you by MyChart, letter or phone within 4 business days after the clinic has received the results. If you do not hear from us within 7 days, please contact the clinic through [employer] or phone. If you have a critical or abnormal lab result, we will notify you by phone as soon as possible.  Submit refill requests through [employer] or call your pharmacy and they will forward the refill request to us. Please allow 3 business days for your refill to be completed.          Additional Information About Your Visit        [employer] Information     [employer] gives you secure access to your electronic health record. If you see a primary care provider, you can also send messages to your care team and make appointments. If you have questions, please call your primary care clinic.  If you do not have a primary care provider, please call [phone] and they will assist you.        Care EveryWhere ID     This is your Care EveryWhere ID. This could be used by other organizations to access your [facility] medical records  [id number]        Your Vitals Were     Pulse Temperature Pulse Oximetry BMI (Body Mass Index)          78 97.8  F (36.6  C) (Oral) 98% 26.58 kg/m2         Blood Pressure from Last 3 Encounters:   04/02/18 102/70   04/02/18 103/70   03/19/18 103/71    Weight from Last 3 Encounters:   04/02/18 73.1 kg (161 lb 2.5 oz)   04/02/18 72.4 kg (159 lb 11.2 oz)   03/19/18 70.9 kg (156 lb 3.2 oz)              We Performed the Following     CBC with platelets differential     Comprehensive metabolic panel     INR        Primary Care Provider Office Phone # Fax #    Solange Mcgill [phone] [address] [address]        Equal Access to Services     CHASE DE GUZMAN : Tre Ruiz, solange rasmussen, re hernandez. [facility] [phone].    ATENCIÓN: angel Morrow  quiros disposición servicios gratuitos de asistencia lingüística. Macy navarrete [phone].    We comply with applicable federal civil rights laws and [place] laws. We do not discriminate on the basis of race, color, national origin, age, disability, sex, sexual orientation, or gender identity.            Thank you!     Thank you for choosing [facility]  for your care. Our goal is always to provide you with excellent care. Hearing back from our patients is one way we can continue to improve our services. Please take a few minutes to complete the written survey that you may receive in the mail after your visit with us. Thank you!             Your Updated Medication List - Protect others around you: Learn how to safely use, store and throw away your medicines at www.disposemymeds.org.          This list is accurate as of 4/2/18 11:59 PM.  Always use your most recent med list.                   Brand Name Dispense Instructions for use Diagnosis    albuterol 108 (90 BASE) MCG/ACT Inhaler    PROAIR HFA/PROVENTIL HFA/VENTOLIN HFA    1 Inhaler    Inhale 2 puffs into the lungs every 6 hours as needed for shortness of breath / dyspnea or wheezing    Acute bronchitis, unspecified organism       amylase-lipase-protease 44477-83241 UNITS Cpep per EC capsule    CREON    450 capsule    Take 2-3 with meals / 1-2 with snacks, up to 15 per day.    Necrotizing pancreatitis       dexamethasone 4 MG tablet    DECADRON    12 tablet    Take 2 tablets (8 mg) by mouth daily (with breakfast)    Pancreatic adenocarcinoma (H)       HYDROcodone-acetaminophen 5-325 MG per tablet    NORCO     every 4 hours as needed        HYDROmorphone 2 MG tablet    DILAUDID    25 tablet    Take 1 tablet (2 mg) by mouth every 4 hours as needed for pain maximum 4 tablet(s) per day        LORazepam 0.5 MG tablet    ATIVAN    30 tablet    Take 1 tablet (0.5 mg) by mouth every 4 hours as needed (Anxiety, Nausea/Vomiting or Sleep)    Pancreatic  adenocarcinoma (H)       ondansetron 8 MG tablet    ZOFRAN    30 tablet    Take 1 tablet (8 mg) by mouth every 8 hours as needed for nausea    Pancreatic adenocarcinoma (H)       oxyCODONE IR 5 MG tablet    ROXICODONE    30 tablet    Take 1 tablet (5 mg) by mouth every 4 hours as needed for moderate to severe pain    Necrotizing pancreatitis, Pancreatic adenocarcinoma (H)       prochlorperazine 10 MG tablet    COMPAZINE    30 tablet    Take 1 tablet (10 mg) by mouth every 6 hours as needed (Nausea/Vomiting)    Pancreatic adenocarcinoma (H)

## 2018-04-02 NOTE — NURSING NOTE
Chief Complaint   Patient presents with     Port Draw     labs drawn via port by RN     /70 (BP Location: Right arm, Patient Position: Chair, Cuff Size: Adult Regular)  Pulse 78  Temp 97.8  F (36.6  C) (Oral)  Wt 72.4 kg (159 lb 11.2 oz)  SpO2 98%  BMI 26.58 kg/m2    Vitals taken. Port accessed by RN. Labs collected and sent. Line flushed with NS & Heparin. Pt tolerated well.    Isabel Weaver RN

## 2018-04-02 NOTE — DISCHARGE INSTRUCTIONS

## 2018-04-02 NOTE — H&P
Pre-Operative H & P     CC:  Preoperative exam to assess for increased cardiopulmonary risk while undergoing surgery and anesthesia.    Date of Encounter: 4/2/2018  Primary Care Physician:  Solange Mcgill    Reason for visit:     Malignant neoplasm of pancreas, unspecified location of malignancy (H)  Pre-op evaluation    HPI  Kitty Bangura is a 57 year old female who presents for pre-operative H & P in preparation for  Diagnostic Laparoscopy; Open Distal Pancreatectomy And Splenectomy on 4/17/18 by Dr. Byrd and Blayne in treatment of pancreatic cancer.   Surgery at [facility]. 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  A cyst was found thus had EUS w/ cystogastrostomy and FNA of panc mass on 11/29 and EGD necrosectomy w/ stent replacement on 12/4. Followed by Dr. Grajeda.  Found to have pancreatic cancer and underwent chemo.  Now here for surgery.  Saw Dr. Byrd earlier today.  His note is pending.   Doing well on Creon.     History of PONV with anesthesia.     History is obtained from the patient and electronic health record.     Past Medical History  Past Medical History:   Diagnosis Date     Necrotizing pancreatitis      Pancreatic cancer (H)      PONV (postoperative nausea and vomiting)        Past Surgical History  Past Surgical History:   Procedure Laterality Date     ABDOMEN SURGERY  1983    Ruptured tubal pregnancies, additional surgeries followed     BACK SURGERY  2004    L5, S1 discectomy     BREAST SURGERY  2003    Breast reduction     COLONOSCOPY  2008     ENDOSCOPIC RETROGRADE CHOLANGIOPANCREATOGRAM N/A 1/25/2018    Procedure: COMBINED ENDOSCOPIC RETROGRADE CHOLANGIOPANCREATOGRAPHY, PLACE TUBE/STENT;  Endoscopic retrograde cholangiopancreatogram with stent placement and cyst drainage bile ;  Surgeon: Vito Sanches MD;  Location: [facility] OR     ENDOSCOPIC ULTRASOUND LOWER GASTROINTESTIONAL TRACT (GI) N/A 1/25/2018    Procedure:  ENDOSCOPIC ULTRASOUND LOWER GASTROINTESTIONAL TRACT (GI);  Endoscopic Ultrasound with guided Cyst-Gastrostomy;  Surgeon: González Metz MD;  Location: [facility] OR     ENDOSCOPIC ULTRASOUND, ESOPHAGOSCOPY, GASTROSCOPY, DUODENOSCOPY (EGD), NECROSECTOMY N/A 2017    Procedure: ENDOSCOPIC ULTRASOUND, ESOPHAGOSCOPY, GASTROSCOPY, DUODENOSCOPY (EGD), NECROSECTOMY;  Esophagogastroduodenoscopy, with  Necrosectomy and stents replacement  ;  Surgeon: González Metz MD;  Location: [facility] OR     ENDOSCOPIC ULTRASOUND, ESOPHAGOSCOPY, GASTROSCOPY, DUODENOSCOPY (EGD), NECROSECTOMY N/A 2017    Procedure: ENDOSCOPIC ULTRASOUND, ESOPHAGOSCOPY, GASTROSCOPY, DUODENOSCOPY (EGD), NECROSECTOMY;  Esophagogastroduodenoscopy with Necrosectomy, Balloon Dilation, stent removal X3 and Cystgastrostomy stent Placement X2;  Surgeon: Guru Cecilia Staples MD;  Location: [facility] OR     ESOPHAGOSCOPY, GASTROSCOPY, DUODENOSCOPY (EGD), COMBINED N/A 2017    Procedure: COMBINED ENDOSCOPIC ULTRASOUND, ESOPHAGOSCOPY, GASTROSCOPY, DUODENOSCOPY (EGD), FINE NEEDLE ASPIRATE/BIOPSY;  Endoscopic Ultrasound with cystgastrostomy and fine needle aspiration ;  Surgeon: González Metz MD;  Location: [facility] OR     GYN SURGERY  1983    Multiple ectopic pregnancies, reconnect,  1988     INSERT PORT VASCULAR ACCESS Right 2018    Procedure: INSERT PORT VASCULAR ACCESS;  Chest Port Placement - right;  Surgeon: Chanda Lawson PA-C;  [facility]       Hx of Blood transfusions/reactions: no     Hx of abnormal bleeding or anti-platelet use: no    Menstrual history: No LMP recorded. Patient is postmenopausal.:     Steroid use in the last year: Decadron with chemo    Personal or FH with difficulty with Anesthesia:  PONV    Prior to Admission Medications  Current Outpatient Prescriptions   Medication Sig Dispense Refill     albuterol (PROAIR HFA/PROVENTIL HFA/VENTOLIN HFA) 108 (90 BASE) MCG/ACT Inhaler Inhale 2 puffs into the  lungs every 6 hours as needed for shortness of breath / dyspnea or wheezing 1 Inhaler 0     dexamethasone (DECADRON) 4 MG tablet Take 2 tablets (8 mg) by mouth daily (with breakfast) 12 tablet 2     HYDROmorphone (DILAUDID) 2 MG tablet Take 1 tablet (2 mg) by mouth every 4 hours as needed for pain maximum 4 tablet(s) per day 25 tablet 0     HYDROcodone-acetaminophen (NORCO) 5-325 MG per tablet every 4 hours as needed   0     oxyCODONE IR (ROXICODONE) 5 MG tablet Take 1 tablet (5 mg) by mouth every 4 hours as needed for moderate to severe pain 30 tablet 0     ondansetron (ZOFRAN) 8 MG tablet Take 1 tablet (8 mg) by mouth every 8 hours as needed for nausea 30 tablet 0     LORazepam (ATIVAN) 0.5 MG tablet Take 1 tablet (0.5 mg) by mouth every 4 hours as needed (Anxiety, Nausea/Vomiting or Sleep) 30 tablet 2     prochlorperazine (COMPAZINE) 10 MG tablet Take 1 tablet (10 mg) by mouth every 6 hours as needed (Nausea/Vomiting) 30 tablet 2     amylase-lipase-protease (CREON) 39193-68718 UNITS CPEP per EC capsule Take 2-3 with meals / 1-2 with snacks, up to 15 per day. 450 capsule 6   Only using Creon currently.  Not needing the other medications.     Allergies  No Known Allergies    Social History  Social History     Social History     Marital status:      Spouse name: N/A     Number of children: N/A     Years of education: N/A     Occupational History     Not on file.     Social History Main Topics     Smoking status: Former Smoker     Packs/day: 0.50     Years: 4.00     Types: Cigarettes     Start date: 1/1/1974     Quit date: 1981     Smokeless tobacco: Never Used     Alcohol use No      Comment: Now quit since Oct 31.  Moderate drinker in past     Drug use: No     Sexual activity: No     Other Topics Concern     Parent/Sibling W/ Cabg, Mi Or Angioplasty Before 65f 55m? Yes     Brother     Social History Narrative    .  Lives with .  Works as a .     1 son.      No family history of  bleeding, clotting disorders or complications with anesthesia.           Family History  Family History   Problem Relation Age of Onset     HEART DISEASE Father      Hyperlipidemia Father      HEART DISEASE Brother      DIABETES Mother      Hypertension Mother      Obesity Mother      DIABETES Maternal Grandfather      Hypertension Maternal Grandfather      Obesity Maternal Grandfather      DIABETES Sister      Hypertension Sister      Depression Sister      Anxiety Disorder Sister      Obesity Sister      Hypertension Brother      Hyperlipidemia Brother      Breast Cancer Cousin      Breast Cancer Cousin      Breast Cancer Cousin      Colon Cancer Other      Other Cancer Other      Mesothelioma     Depression Sister      Anxiety Disorder Brother      Anxiety Disorder Son      MENTAL ILLNESS Sister      Schizophrenia     Obesity Maternal Grandmother      Obesity Sister      No family history of bleeding, clotting disorders or complications with anesthesia.        ROS/MED HX  The complete review of systems is negative other than noted in the HPI or here.     ENT/Pulmonary:  - neg pulmonary ROS   (+)tobacco use, Past use light smoker in past packs/day  , . .    Neurologic:  - neg neurologic ROS     Cardiovascular:  - neg cardiovascular ROS   (+) ----. : . . . :. . No previous cardiac testing      (-) hypertension, CAD, taking anticoagulants/antiplatelets and dyslipidemia   METS/Exercise Tolerance: Comment: Walks at 3 MPH.  Close to 2 miles and upper arm exercises.  >4 METS   Hematologic:  - neg hematologic  ROS       Musculoskeletal:  - neg musculoskeletal ROS       GI/Hepatic: Comment: Necrotizing pancreatitis  Found to have pancreatic adenocarcinoma        Renal/Genitourinary:  - ROS Renal section negative       Endo:  - neg endo ROS       Psychiatric:  - neg psychiatric ROS       Infectious Disease:  - neg infectious disease ROS       Malignancy:   (+) Malignancy History of GI  GI CA Active status post,        "  Other:    (+) No chance of pregnancy no H/O Chronic Pain,            Temp: 97.5  F (36.4  C) Temp src: Oral BP: 102/70 Pulse: 85   Resp: 18 SpO2: 98 %         161 lbs 2.5 oz  5' 5\"   Body mass index is 26.82 kg/(m^2).       Physical Exam  Constitutional: Awake, alert, cooperative, no apparent distress, and appears stated age.  Eyes: Pupils equal, round and reactive to light, sclera clear, conjunctiva normal.  HENT: Normocephalic, oral pharynx with moist mucus membranes, good dentition. No goiter appreciated.   Respiratory: Clear to auscultation bilaterally, no crackles or wheezing.  Cardiovascular: Regular rate and rhythm, normal S1 and S2, and no murmur noted.  Carotids +2, no bruits. No edema. Palpable pulses to radial  DP and PT arteries.   GI: Normal bowel sounds, soft, non-distended, non-tender, no masses palpated, no hepatosplenomegaly.   Lymph/Hematologic: No cervical lymphadenopathy and no supraclavicular lymphadenopathy.  Skin: Warm and dry.  No rashes at anticipated surgical site.   Musculoskeletal: Full ROM of neck. There is no redness, warmth, or swelling of the joints. Gross motor strength is normal.    Neurologic: Awake, alert, oriented to name, place and time. Cranial nerves II-XII are grossly intact. Gait is normal.   Neuropsychiatric: Calm, cooperative. Normal affect.     Labs: (personally reviewed)  4/2/2018 11:49  Sodium: 141  Potassium: 3.9  Chloride: 110 (H)  Carbon Dioxide: 25  Urea Nitrogen: 13  Creatinine: 0.52  GFR Estimate: >90  GFR Estimate If Black: >90  Calcium: 9.1  Anion Gap: 6  Albumin: 3.3 (L)  Protein Total: 7.2  Bilirubin Total: 0.4  Alkaline Phosphatase: 109  ALT: 28  AST: 30  Glucose: 95    WBC: 7.5  Hemoglobin: 11.1 (L)  Hematocrit: 34.3 (L)  Platelet Count: 254  RBC Count: 3.57 (L)  MCV: 96  MCH: 31.1  MCHC: 32.4  RDW: 18.7 (H)  Diff Method: Automated Method  % Neutrophils: 47.0  % Lymphocytes: 33.4  % Monocytes: 17.5  % Eosinophils: 1.1  % Basophils: 0.9  % Immature " Granulocytes: 0.1  Nucleated RBCs: 0  Absolute Neutrophil: 3.5  Absolute Lymphocytes: 2.5  Absolute Monocytes: 1.3  Absolute Eosinophils: 0.1  Absolute Basophils: 0.1  Abs Immature Granulocytes: 0.0  Absolute Nucleated RBC: 0.0  INR: 0.99        Outside records reviewed from: Care Everywhere      ASSESSMENT and PLAN  Kitty Bangura is a 57 year old female scheduled to undergo Diagnostic Laparoscopy; Open Distal Pancreatectomy And Splenectomy on 4/17/18 by Dr. Byrd and Blayne in treatment of pancreatic cancer.  PAC referral for risk assessment and optimization for anesthesia with comorbid conditions of:    **right chest port--> please access if able.     Pre-operative considerations:  1.  Cardiac:  Functional status very good.  Attending therapy.  Uses treadmill almost 2 miles at 3 MPH. 0.9%  risk of major adverse cardiac event.    2.  Pulm:  Airway feasible.  IAN risk:  Low.  Non-smoker.  Has albuterol but not using.   01/25/18; 1056; Mask Ventilation: Easy; Ease of Intubation: Easy; Airway Size: 6;  Cuffed;  Oral;  Blade Type: Conroy;  Blade Size: 2;  Place by: Breanna;  Insertion Attempts: 1;  Secured at (cm)to lip: 21 cm;  Breath Sounds: Equal, clear and bilateral;  End Tidal CO2: Present;  Dentition: Intact, Unchanged;  Grade View of Cords: 1  3.  GI:  Risk of PONV score = 4.  If 3 or > anti-emetic intervention recommended (with 2 or more meds).  Scopolamine patch ordered (per patient request).  She also requests Zofran.   4.  Onc:  Pancreatic cancer s/p chemo.  Has right chest port.   Immunizations given today (per patient report)  5.  DVT proph recommended.     Patient is optimized and is acceptable candidate for the proposed procedure.  No further diagnostic evaluation is needed.   For complete medication and diet instructions for surgery, please refer to the AVS completed by nursing.   Patient was discussed with Dr Howell.    Venessa Manrique PA-C  Preoperative Assessment Center  [facility]  [facility]  [facility]  Phone: [phone]  Fax: [phone]

## 2018-04-04 ENCOUNTER — HOSPITAL ENCOUNTER (OUTPATIENT)
Dept: PHYSICAL THERAPY | Facility: CLINIC | Age: 58
Setting detail: THERAPIES SERIES
End: 2018-04-04
Attending: INTERNAL MEDICINE
Payer: COMMERCIAL

## 2018-04-04 ENCOUNTER — CARE COORDINATION (OUTPATIENT)
Dept: SURGERY | Facility: CLINIC | Age: 58
End: 2018-04-04

## 2018-04-04 PROBLEM — C25.9 PANCREAS CANCER (H): Status: ACTIVE | Noted: 2018-04-04

## 2018-04-04 PROCEDURE — 40000360 ZZHC STATISTIC PT CANCER REHAB VISIT: Performed by: PHYSICAL THERAPIST

## 2018-04-04 PROCEDURE — 97110 THERAPEUTIC EXERCISES: CPT | Mod: GP | Performed by: PHYSICAL THERAPIST

## 2018-04-04 ASSESSMENT — 6 MINUTE WALK TEST (6MWT): TOTAL DISTANCE WALKED (METERS): 1644

## 2018-04-04 NOTE — PROGRESS NOTES
Care Coordination Telephone Call  GI Service and Surgical Oncology    Called patient to discuss voice mail message regarding possible rectal fissure and some pain with bowel movements and little red blood on tissue when she wipes.  I asked for call back.    I have asked the patient to call with any additional questions or concerns and have provided my contact information.    Emmanuelle GILLETTEN, HNBC, STAR-T  RN Care Coordinator  Surgical Oncology and GI service  Ph: [phone]  FAX: [phone]

## 2018-04-06 ENCOUNTER — HOSPITAL ENCOUNTER (OUTPATIENT)
Dept: PHYSICAL THERAPY | Facility: CLINIC | Age: 58
Setting detail: THERAPIES SERIES
End: 2018-04-06
Attending: INTERNAL MEDICINE
Payer: COMMERCIAL

## 2018-04-06 ENCOUNTER — TRANSFERRED RECORDS (OUTPATIENT)
Dept: HEALTH INFORMATION MANAGEMENT | Facility: CLINIC | Age: 58
End: 2018-04-06

## 2018-04-06 PROCEDURE — 97110 THERAPEUTIC EXERCISES: CPT | Mod: GP | Performed by: PHYSICAL THERAPIST

## 2018-04-06 PROCEDURE — 40000360 ZZHC STATISTIC PT CANCER REHAB VISIT: Performed by: PHYSICAL THERAPIST

## 2018-04-06 ASSESSMENT — 6 MINUTE WALK TEST (6MWT): TOTAL DISTANCE WALKED (METERS): 1644

## 2018-04-09 ENCOUNTER — HOSPITAL ENCOUNTER (OUTPATIENT)
Dept: PHYSICAL THERAPY | Facility: CLINIC | Age: 58
Setting detail: THERAPIES SERIES
End: 2018-04-09
Attending: INTERNAL MEDICINE
Payer: COMMERCIAL

## 2018-04-09 PROCEDURE — 40000360 ZZHC STATISTIC PT CANCER REHAB VISIT: Performed by: PHYSICAL THERAPIST

## 2018-04-09 PROCEDURE — 97110 THERAPEUTIC EXERCISES: CPT | Mod: GP | Performed by: PHYSICAL THERAPIST

## 2018-04-09 NOTE — PROGRESS NOTES
"CA Ex Flow Sheet  -  Pancreatic CA.      Date/Exercise   3/22/18   4/9/18           Bike   UBE     UBE seat 6 L 4.0 decreased to 3.5 X 5 min  LE bike seat 9 double hill profile L 4 X 10 min             Stretches: 15\" holds: B UT/ levator/ Pec/ HS and  Stretches.  LTRS       Treadmill   6' walk test   --           U/E Strengthening  1# Flex, Ext, Scap, Abd, 3# Biceps, Chair push ups  shoulder flex   1# 12 X 2 B   scaption  1# 12 X 2 B  Extension   2# 12 X 2  Rows  3# 10 X 2 B   biceps  4# X 12 B  Chair pushups  12 X 2           L/E Strengthening     YTB   hip abd X 15 B,   ext X 15 B  flex X 12 B                 sit to stand X15                Heel/toe raises X 15                step ups  6\" X 12 B                marching w/ 2# at ankles x 15 B                                                           BP Limits: Systolic <90 or >200 / Diastolic < 65 or > 120 mm /Hg   HR Limits:  BPS.   O2 <88%    -    Glucose between 70 to 250.   WBC normal 5000-43568.  > 5000 ok for light ex progress to resistive.  < 5000 if has fever no ex unless cleared by MD  Hemoglobin:  10-12 low impact / low intesity aerobics/ activity  8-10 Monitor vitals.  Can do ROM, no aerobic activity  < 8 Red flag--discuss cont w/ MD  Platelets: no theraband if platelets variable.  Norm: 130,000 to 400,000  > 80599 resistive ex ok, light wts  30,000-50,000 walking, bike. No prolonged stretching  20,000 -30,000 AROM only, walking as tolerated  < 20,0000 AAROM, AROM (no reistive ex or antigravity)    "

## 2018-04-11 ENCOUNTER — CARE COORDINATION (OUTPATIENT)
Dept: SURGERY | Facility: CLINIC | Age: 58
End: 2018-04-11

## 2018-04-11 ENCOUNTER — HOSPITAL ENCOUNTER (OUTPATIENT)
Dept: PHYSICAL THERAPY | Facility: CLINIC | Age: 58
Setting detail: THERAPIES SERIES
End: 2018-04-11
Attending: INTERNAL MEDICINE
Payer: COMMERCIAL

## 2018-04-11 PROCEDURE — 97110 THERAPEUTIC EXERCISES: CPT | Mod: GP | Performed by: PHYSICAL THERAPIST

## 2018-04-11 PROCEDURE — 40000360 ZZHC STATISTIC PT CANCER REHAB VISIT: Performed by: PHYSICAL THERAPIST

## 2018-04-11 RX ORDER — CEFAZOLIN SODIUM 1 G/3ML
1 INJECTION, POWDER, FOR SOLUTION INTRAMUSCULAR; INTRAVENOUS SEE ADMIN INSTRUCTIONS
Status: CANCELLED | OUTPATIENT
Start: 2018-04-11

## 2018-04-11 RX ORDER — CEFAZOLIN SODIUM 2 G/100ML
2 INJECTION, SOLUTION INTRAVENOUS
Status: CANCELLED | OUTPATIENT
Start: 2018-04-11

## 2018-04-11 ASSESSMENT — 6 MINUTE WALK TEST (6MWT): TOTAL DISTANCE WALKED (METERS): 1812

## 2018-04-11 NOTE — PROGRESS NOTES
Care Coordination Telephone Call  GI Service and Surgical Oncology    Called patient to discuss and review preoperative information regarding what to expect post surgery.  We have reviewed NG tube, abdominal drain, demarco catheter, IV fluids, length of stay in hospital.     She also relates that she thinks she has a rectal fissure that is bothering her.  I have discussed sitz baths, rinsing after stools and applying moisture barrier once clean.    I have asked the patient to call with any additional questions or concerns and have provided my contact information.    Plan:  Mario Alberto next week.    Emmanuelle GILLETTEN, HNBC, STAR-T  RN Care Coordinator  Surgical Oncology and GI service  Ph: [phone]  FAX: [phone]

## 2018-04-11 NOTE — PROGRESS NOTES
Outpatient Physical Therapy Discharge Note     Patient: Kitty Bangura  : 1960    Beginning/End Dates of Reporting Period:  3/22/18 to 2018.  Total # of rx sessions: 7    Referring Provider: Jovany Monk Diagnosis: Pancreatic CA      Client Self Report: Things are going well. Feeling Good. Surgery on 18, then after that will be doing chemo again. D/C w/ HEP.  May consider restarting CA Rehab after Surgery and Chemo.     Objective Measurements:  Objective Measure: 6' Walk test   Details: 18 1812 Feet, OMNI 1.    INITIALLY: 1644 Feet, OMNI 1     Objective Measure: FACIT   Details: 18 Score 49    INITIALLY: 22    Objective Measure: MMT:   Details: 18 ER 5 B, Elbow Flex 5 B, Elbow Ext 5- B.   INITIALLY: U/E: ER R 4+, L 5-; Elbow Flex 4+ B; Elbow Ext R 4+, L 4  .   Objective Measure:  Strength:   Details: 18 Average of 3, R 58, L 67   INITIALLY: Average of 3, R 52, L 58.     Objective Measure: Vitals  Details: Before Ex: /77; O2 98%; HR 74.    After Ex: /77; O2 98%; .        Outcome Measures (most recent score):  FACIT Fatigue Subscale (score out of 52). The higher the score, the better the QOL.: 49  Six Minute Walk (meters). An increase of 70 or more meters indicates statistically significant change.:     Goals:  Goal Identifier 1   Goal Description  STG: Pt will improve 6' walk test to 1750 feet or more for better aerobic capacity to prepare for surgery.  18 MET    Target Date 18   Date Met  18   Progress:     Goal Identifier 2   Goal Description STG: Pt will improve strength of U/E by 1/2 grade for better ability to do HH duties. 18  MET    Target Date 18   Date Met  18   Progress:     Goal Identifier 3   Goal Description STG: Pt will improve FACIT score to 30 or better for less fatigue and more involvement in HH duties and life activities. 18 MET    Target Date 18   Date Met  18   Progress:      Goal Identifier 4   Goal Description LTG: Pt will be independent w/ Ex's and understand purpose of CA Rehab to continue after Rx's have been completed. 4/11/18 MET    Target Date 04/16/18   Date Met  04/11/18   Progress:     Progress Toward Goals:   Progress this reporting period: Pt has met all goals.     Plan:  Discharge from therapy.    Discharge: Yes     Reason for Discharge: Patient has met all goals.  Patient chooses to discontinue therapy.    Equipment Issued:      Discharge Plan: Patient to continue home program.  Pt to follow up w/MD as appropriate.   Lora Davis, PT, [facility] (#[id number])  [facility]           [phone]  Fax          [phone]  Appt #      [phone]

## 2018-04-16 ENCOUNTER — ANESTHESIA EVENT (OUTPATIENT)
Dept: SURGERY | Facility: CLINIC | Age: 58
DRG: 405 | End: 2018-04-16
Payer: COMMERCIAL

## 2018-04-17 ENCOUNTER — SURGERY (OUTPATIENT)
Age: 58
End: 2018-04-17
Payer: COMMERCIAL

## 2018-04-17 ENCOUNTER — ANESTHESIA (OUTPATIENT)
Dept: SURGERY | Facility: CLINIC | Age: 58
DRG: 405 | End: 2018-04-17
Payer: COMMERCIAL

## 2018-04-17 ENCOUNTER — HOSPITAL ENCOUNTER (INPATIENT)
Facility: CLINIC | Age: 58
LOS: 5 days | Discharge: HOME | DRG: 405 | End: 2018-04-22
Attending: SURGERY | Admitting: SURGERY
Payer: COMMERCIAL

## 2018-04-17 DIAGNOSIS — C25.9 PANCREATIC ADENOCARCINOMA (H): ICD-10-CM

## 2018-04-17 DIAGNOSIS — K59.03 DRUG-INDUCED CONSTIPATION: ICD-10-CM

## 2018-04-17 DIAGNOSIS — G89.18 ACUTE POST-OPERATIVE PAIN: Primary | ICD-10-CM

## 2018-04-17 LAB
ANION GAP SERPL CALCULATED.3IONS-SCNC: 11 MMOL/L (ref 3–14)
BASE DEFICIT BLDA-SCNC: 1.5 MMOL/L
BASE DEFICIT BLDA-SCNC: 3.1 MMOL/L
BASE DEFICIT BLDA-SCNC: 4.3 MMOL/L
BUN SERPL-MCNC: 17 MG/DL (ref 7–30)
CA-I BLD-MCNC: 4.6 MG/DL (ref 4.4–5.2)
CA-I BLD-MCNC: 4.7 MG/DL (ref 4.4–5.2)
CA-I BLD-MCNC: 4.8 MG/DL (ref 4.4–5.2)
CALCIUM SERPL-MCNC: 7.8 MG/DL (ref 8.5–10.1)
CHLORIDE SERPL-SCNC: 115 MMOL/L (ref 94–109)
CO2 SERPL-SCNC: 19 MMOL/L (ref 20–32)
CREAT SERPL-MCNC: 0.69 MG/DL (ref 0.52–1.04)
CREAT SERPL-MCNC: 0.71 MG/DL (ref 0.52–1.04)
ERYTHROCYTE [DISTWIDTH] IN BLOOD BY AUTOMATED COUNT: 16.3 % (ref 10–15)
GFR SERPL CREATININE-BSD FRML MDRD: 85 ML/MIN/1.7M2
GFR SERPL CREATININE-BSD FRML MDRD: 87 ML/MIN/1.7M2
GLUCOSE BLD-MCNC: 157 MG/DL (ref 70–99)
GLUCOSE BLD-MCNC: 167 MG/DL (ref 70–99)
GLUCOSE BLD-MCNC: 91 MG/DL (ref 70–99)
GLUCOSE BLDC GLUCOMTR-MCNC: 107 MG/DL (ref 70–99)
GLUCOSE BLDC GLUCOMTR-MCNC: 178 MG/DL (ref 70–99)
GLUCOSE SERPL-MCNC: 202 MG/DL (ref 70–99)
HBA1C MFR BLD: 5.5 % (ref 0–6.4)
HCO3 BLD-SCNC: 21 MMOL/L (ref 21–28)
HCO3 BLD-SCNC: 22 MMOL/L (ref 21–28)
HCO3 BLD-SCNC: 24 MMOL/L (ref 21–28)
HCT VFR BLD AUTO: 28.1 % (ref 35–47)
HGB BLD-MCNC: 10.3 G/DL (ref 11.7–15.7)
HGB BLD-MCNC: 8.9 G/DL (ref 11.7–15.7)
HGB BLD-MCNC: 9.6 G/DL (ref 11.7–15.7)
HGB BLD-MCNC: 9.6 G/DL (ref 11.7–15.7)
INR PPP: 1.24 (ref 0.86–1.14)
INTERPRETATION ECG - MUSE: NORMAL
MAGNESIUM SERPL-MCNC: 1.8 MG/DL (ref 1.6–2.3)
MCH RBC QN AUTO: 31.2 PG (ref 26.5–33)
MCHC RBC AUTO-ENTMCNC: 31.7 G/DL (ref 31.5–36.5)
MCV RBC AUTO: 99 FL (ref 78–100)
O2/TOTAL GAS SETTING VFR VENT: 35 %
O2/TOTAL GAS SETTING VFR VENT: 35 %
O2/TOTAL GAS SETTING VFR VENT: 36 %
PCO2 BLD: 40 MM HG (ref 35–45)
PCO2 BLD: 41 MM HG (ref 35–45)
PCO2 BLD: 41 MM HG (ref 35–45)
PH BLD: 7.32 PH (ref 7.35–7.45)
PH BLD: 7.35 PH (ref 7.35–7.45)
PH BLD: 7.38 PH (ref 7.35–7.45)
PHOSPHATE SERPL-MCNC: 4.9 MG/DL (ref 2.5–4.5)
PLATELET # BLD AUTO: 164 10E9/L (ref 150–450)
PLATELET # BLD AUTO: 183 10E9/L (ref 150–450)
PO2 BLD: 154 MM HG (ref 80–105)
PO2 BLD: 157 MM HG (ref 80–105)
PO2 BLD: 158 MM HG (ref 80–105)
POTASSIUM BLD-SCNC: 3.8 MMOL/L (ref 3.4–5.3)
POTASSIUM BLD-SCNC: 4.1 MMOL/L (ref 3.4–5.3)
POTASSIUM BLD-SCNC: 4.1 MMOL/L (ref 3.4–5.3)
POTASSIUM SERPL-SCNC: 4.7 MMOL/L (ref 3.4–5.3)
RBC # BLD AUTO: 2.85 10E12/L (ref 3.8–5.2)
SODIUM BLD-SCNC: 140 MMOL/L (ref 133–144)
SODIUM BLD-SCNC: 141 MMOL/L (ref 133–144)
SODIUM BLD-SCNC: 143 MMOL/L (ref 133–144)
SODIUM SERPL-SCNC: 145 MMOL/L (ref 133–144)
WBC # BLD AUTO: 20 10E9/L (ref 4–11)

## 2018-04-17 PROCEDURE — C9399 UNCLASSIFIED DRUGS OR BIOLOG: HCPCS | Performed by: NURSE ANESTHETIST, CERTIFIED REGISTERED

## 2018-04-17 PROCEDURE — 07TP0ZZ RESECTION OF SPLEEN, OPEN APPROACH: ICD-10-PCS | Performed by: SURGERY

## 2018-04-17 PROCEDURE — 25000128 H RX IP 250 OP 636: Performed by: STUDENT IN AN ORGANIZED HEALTH CARE EDUCATION/TRAINING PROGRAM

## 2018-04-17 PROCEDURE — 82803 BLOOD GASES ANY COMBINATION: CPT | Performed by: ANESTHESIOLOGY

## 2018-04-17 PROCEDURE — 36000062 ZZH SURGERY LEVEL 4 1ST 30 MIN - UMMC: Performed by: SURGERY

## 2018-04-17 PROCEDURE — 88304 TISSUE EXAM BY PATHOLOGIST: CPT | Performed by: SURGERY

## 2018-04-17 PROCEDURE — 25000125 ZZHC RX 250: Performed by: SURGERY

## 2018-04-17 PROCEDURE — 83036 HEMOGLOBIN GLYCOSYLATED A1C: CPT | Performed by: STUDENT IN AN ORGANIZED HEALTH CARE EDUCATION/TRAINING PROGRAM

## 2018-04-17 PROCEDURE — 25000131 ZZH RX MED GY IP 250 OP 636 PS 637: Performed by: STUDENT IN AN ORGANIZED HEALTH CARE EDUCATION/TRAINING PROGRAM

## 2018-04-17 PROCEDURE — 84100 ASSAY OF PHOSPHORUS: CPT | Performed by: STUDENT IN AN ORGANIZED HEALTH CARE EDUCATION/TRAINING PROGRAM

## 2018-04-17 PROCEDURE — 93010 ELECTROCARDIOGRAM REPORT: CPT | Performed by: INTERNAL MEDICINE

## 2018-04-17 PROCEDURE — 82330 ASSAY OF CALCIUM: CPT | Performed by: ANESTHESIOLOGY

## 2018-04-17 PROCEDURE — 25000128 H RX IP 250 OP 636: Performed by: NURSE ANESTHETIST, CERTIFIED REGISTERED

## 2018-04-17 PROCEDURE — 25000128 H RX IP 250 OP 636: Performed by: ANESTHESIOLOGY

## 2018-04-17 PROCEDURE — 40000014 ZZH STATISTIC ARTERIAL MONITORING DAILY

## 2018-04-17 PROCEDURE — 84295 ASSAY OF SERUM SODIUM: CPT | Performed by: ANESTHESIOLOGY

## 2018-04-17 PROCEDURE — 0WJG4ZZ INSPECTION OF PERITONEAL CAVITY, PERCUTANEOUS ENDOSCOPIC APPROACH: ICD-10-PCS | Performed by: SURGERY

## 2018-04-17 PROCEDURE — 00000146 ZZHCL STATISTIC GLUCOSE BY METER IP

## 2018-04-17 PROCEDURE — 25000566 ZZH SEVOFLURANE, EA 15 MIN: Performed by: SURGERY

## 2018-04-17 PROCEDURE — 0FBG0ZZ EXCISION OF PANCREAS, OPEN APPROACH: ICD-10-PCS | Performed by: SURGERY

## 2018-04-17 PROCEDURE — 36592 COLLECT BLOOD FROM PICC: CPT | Performed by: STUDENT IN AN ORGANIZED HEALTH CARE EDUCATION/TRAINING PROGRAM

## 2018-04-17 PROCEDURE — 25000125 ZZHC RX 250: Performed by: ANESTHESIOLOGY

## 2018-04-17 PROCEDURE — 37000008 ZZH ANESTHESIA TECHNICAL FEE, 1ST 30 MIN: Performed by: SURGERY

## 2018-04-17 PROCEDURE — 85610 PROTHROMBIN TIME: CPT | Performed by: STUDENT IN AN ORGANIZED HEALTH CARE EDUCATION/TRAINING PROGRAM

## 2018-04-17 PROCEDURE — 27210995 ZZH RX 272: Performed by: SURGERY

## 2018-04-17 PROCEDURE — 86850 RBC ANTIBODY SCREEN: CPT | Performed by: STUDENT IN AN ORGANIZED HEALTH CARE EDUCATION/TRAINING PROGRAM

## 2018-04-17 PROCEDURE — 82565 ASSAY OF CREATININE: CPT | Performed by: STUDENT IN AN ORGANIZED HEALTH CARE EDUCATION/TRAINING PROGRAM

## 2018-04-17 PROCEDURE — 12000001 ZZH R&B MED SURG/OB UMMC

## 2018-04-17 PROCEDURE — 86900 BLOOD TYPING SEROLOGIC ABO: CPT | Performed by: STUDENT IN AN ORGANIZED HEALTH CARE EDUCATION/TRAINING PROGRAM

## 2018-04-17 PROCEDURE — 86901 BLOOD TYPING SEROLOGIC RH(D): CPT | Performed by: STUDENT IN AN ORGANIZED HEALTH CARE EDUCATION/TRAINING PROGRAM

## 2018-04-17 PROCEDURE — 36000064 ZZH SURGERY LEVEL 4 EA 15 ADDTL MIN - UMMC: Performed by: SURGERY

## 2018-04-17 PROCEDURE — 0FT40ZZ RESECTION OF GALLBLADDER, OPEN APPROACH: ICD-10-PCS | Performed by: SURGERY

## 2018-04-17 PROCEDURE — 71000015 ZZH RECOVERY PHASE 1 LEVEL 2 EA ADDTL HR: Performed by: SURGERY

## 2018-04-17 PROCEDURE — 25000125 ZZHC RX 250: Performed by: PHYSICIAN ASSISTANT

## 2018-04-17 PROCEDURE — 40000065 ZZH STATISTIC EKG NON-CHARGEABLE

## 2018-04-17 PROCEDURE — 83735 ASSAY OF MAGNESIUM: CPT | Performed by: STUDENT IN AN ORGANIZED HEALTH CARE EDUCATION/TRAINING PROGRAM

## 2018-04-17 PROCEDURE — 0DBU0ZZ EXCISION OF OMENTUM, OPEN APPROACH: ICD-10-PCS | Performed by: SURGERY

## 2018-04-17 PROCEDURE — 37000009 ZZH ANESTHESIA TECHNICAL FEE, EACH ADDTL 15 MIN: Performed by: SURGERY

## 2018-04-17 PROCEDURE — 71000014 ZZH RECOVERY PHASE 1 LEVEL 2 FIRST HR: Performed by: SURGERY

## 2018-04-17 PROCEDURE — 25000132 ZZH RX MED GY IP 250 OP 250 PS 637: Performed by: STUDENT IN AN ORGANIZED HEALTH CARE EDUCATION/TRAINING PROGRAM

## 2018-04-17 PROCEDURE — 85027 COMPLETE CBC AUTOMATED: CPT | Performed by: STUDENT IN AN ORGANIZED HEALTH CARE EDUCATION/TRAINING PROGRAM

## 2018-04-17 PROCEDURE — C9290 INJ, BUPIVACAINE LIPOSOME: HCPCS | Performed by: ANESTHESIOLOGY

## 2018-04-17 PROCEDURE — 25000125 ZZHC RX 250: Performed by: NURSE ANESTHETIST, CERTIFIED REGISTERED

## 2018-04-17 PROCEDURE — 27210794 ZZH OR GENERAL SUPPLY STERILE: Performed by: SURGERY

## 2018-04-17 PROCEDURE — 80048 BASIC METABOLIC PNL TOTAL CA: CPT | Performed by: STUDENT IN AN ORGANIZED HEALTH CARE EDUCATION/TRAINING PROGRAM

## 2018-04-17 PROCEDURE — 82947 ASSAY GLUCOSE BLOOD QUANT: CPT | Performed by: ANESTHESIOLOGY

## 2018-04-17 PROCEDURE — 40000275 ZZH STATISTIC RCP TIME EA 10 MIN

## 2018-04-17 PROCEDURE — 48140 PARTIAL REMOVAL OF PANCREAS: CPT | Mod: 22 | Performed by: SURGERY

## 2018-04-17 PROCEDURE — 85049 AUTOMATED PLATELET COUNT: CPT | Performed by: STUDENT IN AN ORGANIZED HEALTH CARE EDUCATION/TRAINING PROGRAM

## 2018-04-17 PROCEDURE — 88309 TISSUE EXAM BY PATHOLOGIST: CPT | Performed by: SURGERY

## 2018-04-17 PROCEDURE — 40000171 ZZH STATISTIC PRE-PROCEDURE ASSESSMENT III: Performed by: SURGERY

## 2018-04-17 PROCEDURE — 84132 ASSAY OF SERUM POTASSIUM: CPT | Performed by: ANESTHESIOLOGY

## 2018-04-17 PROCEDURE — 0WJP4ZZ INSPECTION OF GASTROINTESTINAL TRACT, PERCUTANEOUS ENDOSCOPIC APPROACH: ICD-10-PCS | Performed by: SURGERY

## 2018-04-17 RX ORDER — SODIUM CHLORIDE 9 MG/ML
INJECTION, SOLUTION INTRAVENOUS CONTINUOUS
Status: DISCONTINUED | OUTPATIENT
Start: 2018-04-17 | End: 2018-04-18

## 2018-04-17 RX ORDER — ACETAMINOPHEN 10 MG/ML
1000 INJECTION, SOLUTION INTRAVENOUS EVERY 8 HOURS
Status: DISCONTINUED | OUTPATIENT
Start: 2018-04-17 | End: 2018-04-18

## 2018-04-17 RX ORDER — SODIUM CHLORIDE, SODIUM LACTATE, POTASSIUM CHLORIDE, CALCIUM CHLORIDE 600; 310; 30; 20 MG/100ML; MG/100ML; MG/100ML; MG/100ML
INJECTION, SOLUTION INTRAVENOUS CONTINUOUS PRN
Status: DISCONTINUED | OUTPATIENT
Start: 2018-04-17 | End: 2018-04-17

## 2018-04-17 RX ORDER — DIPHENHYDRAMINE HYDROCHLORIDE 50 MG/ML
25 INJECTION INTRAMUSCULAR; INTRAVENOUS EVERY 6 HOURS PRN
Status: DISCONTINUED | OUTPATIENT
Start: 2018-04-17 | End: 2018-04-22 | Stop reason: HOSPADM

## 2018-04-17 RX ORDER — ONDANSETRON 4 MG/1
4 TABLET, ORALLY DISINTEGRATING ORAL EVERY 30 MIN PRN
Status: DISCONTINUED | OUTPATIENT
Start: 2018-04-17 | End: 2018-04-17 | Stop reason: HOSPADM

## 2018-04-17 RX ORDER — NICOTINE POLACRILEX 4 MG
15-30 LOZENGE BUCCAL
Status: DISCONTINUED | OUTPATIENT
Start: 2018-04-17 | End: 2018-04-22 | Stop reason: HOSPADM

## 2018-04-17 RX ORDER — LIDOCAINE 40 MG/G
CREAM TOPICAL
Status: DISCONTINUED | OUTPATIENT
Start: 2018-04-17 | End: 2018-04-22 | Stop reason: HOSPADM

## 2018-04-17 RX ORDER — FLUMAZENIL 0.1 MG/ML
0.2 INJECTION, SOLUTION INTRAVENOUS
Status: DISCONTINUED | OUTPATIENT
Start: 2018-04-17 | End: 2018-04-17 | Stop reason: HOSPADM

## 2018-04-17 RX ORDER — LIDOCAINE 4 G/G
2 PATCH TOPICAL
Status: CANCELLED | OUTPATIENT
Start: 2018-04-17

## 2018-04-17 RX ORDER — ONDANSETRON 2 MG/ML
4 INJECTION INTRAMUSCULAR; INTRAVENOUS EVERY 30 MIN PRN
Status: DISCONTINUED | OUTPATIENT
Start: 2018-04-17 | End: 2018-04-17 | Stop reason: HOSPADM

## 2018-04-17 RX ORDER — DEXTROSE MONOHYDRATE 25 G/50ML
25-50 INJECTION, SOLUTION INTRAVENOUS
Status: DISCONTINUED | OUTPATIENT
Start: 2018-04-17 | End: 2018-04-22 | Stop reason: HOSPADM

## 2018-04-17 RX ORDER — ONDANSETRON 4 MG/1
4 TABLET, ORALLY DISINTEGRATING ORAL EVERY 6 HOURS PRN
Status: DISCONTINUED | OUTPATIENT
Start: 2018-04-17 | End: 2018-04-22 | Stop reason: HOSPADM

## 2018-04-17 RX ORDER — NALOXONE HYDROCHLORIDE 0.4 MG/ML
.1-.4 INJECTION, SOLUTION INTRAMUSCULAR; INTRAVENOUS; SUBCUTANEOUS
Status: DISCONTINUED | OUTPATIENT
Start: 2018-04-17 | End: 2018-04-22 | Stop reason: HOSPADM

## 2018-04-17 RX ORDER — SODIUM CHLORIDE, SODIUM LACTATE, POTASSIUM CHLORIDE, CALCIUM CHLORIDE 600; 310; 30; 20 MG/100ML; MG/100ML; MG/100ML; MG/100ML
INJECTION, SOLUTION INTRAVENOUS CONTINUOUS
Status: DISCONTINUED | OUTPATIENT
Start: 2018-04-17 | End: 2018-04-17 | Stop reason: HOSPADM

## 2018-04-17 RX ORDER — CYCLOBENZAPRINE HCL 10 MG
10 TABLET ORAL 3 TIMES DAILY PRN
Status: DISCONTINUED | OUTPATIENT
Start: 2018-04-17 | End: 2018-04-22 | Stop reason: HOSPADM

## 2018-04-17 RX ORDER — HYDROMORPHONE HYDROCHLORIDE 1 MG/ML
.3-.5 INJECTION, SOLUTION INTRAMUSCULAR; INTRAVENOUS; SUBCUTANEOUS EVERY 5 MIN PRN
Status: DISCONTINUED | OUTPATIENT
Start: 2018-04-17 | End: 2018-04-17 | Stop reason: HOSPADM

## 2018-04-17 RX ORDER — ALBUTEROL SULFATE 90 UG/1
2 AEROSOL, METERED RESPIRATORY (INHALATION) EVERY 6 HOURS PRN
Status: DISCONTINUED | OUTPATIENT
Start: 2018-04-17 | End: 2018-04-22 | Stop reason: HOSPADM

## 2018-04-17 RX ORDER — ONDANSETRON 2 MG/ML
INJECTION INTRAMUSCULAR; INTRAVENOUS PRN
Status: DISCONTINUED | OUTPATIENT
Start: 2018-04-17 | End: 2018-04-17

## 2018-04-17 RX ORDER — ONDANSETRON 2 MG/ML
4 INJECTION INTRAMUSCULAR; INTRAVENOUS EVERY 6 HOURS PRN
Status: DISCONTINUED | OUTPATIENT
Start: 2018-04-17 | End: 2018-04-22 | Stop reason: HOSPADM

## 2018-04-17 RX ORDER — PROCHLORPERAZINE MALEATE 5 MG
10 TABLET ORAL EVERY 6 HOURS PRN
Status: DISCONTINUED | OUTPATIENT
Start: 2018-04-17 | End: 2018-04-19

## 2018-04-17 RX ORDER — ERTAPENEM 1 G/1
1 INJECTION, POWDER, LYOPHILIZED, FOR SOLUTION INTRAMUSCULAR; INTRAVENOUS ONCE
Status: COMPLETED | OUTPATIENT
Start: 2018-04-17 | End: 2018-04-17

## 2018-04-17 RX ORDER — SCOLOPAMINE TRANSDERMAL SYSTEM 1 MG/1
1 PATCH, EXTENDED RELEASE TRANSDERMAL ONCE
Status: COMPLETED | OUTPATIENT
Start: 2018-04-17 | End: 2018-04-17

## 2018-04-17 RX ORDER — LIDOCAINE HYDROCHLORIDE 20 MG/ML
INJECTION, SOLUTION INFILTRATION; PERINEURAL PRN
Status: DISCONTINUED | OUTPATIENT
Start: 2018-04-17 | End: 2018-04-17

## 2018-04-17 RX ORDER — DEXAMETHASONE SODIUM PHOSPHATE 4 MG/ML
INJECTION, SOLUTION INTRA-ARTICULAR; INTRALESIONAL; INTRAMUSCULAR; INTRAVENOUS; SOFT TISSUE PRN
Status: DISCONTINUED | OUTPATIENT
Start: 2018-04-17 | End: 2018-04-17

## 2018-04-17 RX ORDER — NALOXONE HYDROCHLORIDE 0.4 MG/ML
.1-.4 INJECTION, SOLUTION INTRAMUSCULAR; INTRAVENOUS; SUBCUTANEOUS
Status: DISCONTINUED | OUTPATIENT
Start: 2018-04-17 | End: 2018-04-17 | Stop reason: HOSPADM

## 2018-04-17 RX ORDER — PROCHLORPERAZINE MALEATE 5 MG
10 TABLET ORAL EVERY 6 HOURS PRN
Status: DISCONTINUED | OUTPATIENT
Start: 2018-04-17 | End: 2018-04-22 | Stop reason: HOSPADM

## 2018-04-17 RX ORDER — MORPHINE SULFATE 0.5 MG/ML
INJECTION, SOLUTION EPIDURAL; INTRATHECAL; INTRAVENOUS PRN
Status: DISCONTINUED | OUTPATIENT
Start: 2018-04-17 | End: 2018-04-17

## 2018-04-17 RX ORDER — SODIUM CHLORIDE 9 MG/ML
INJECTION, SOLUTION INTRAVENOUS CONTINUOUS PRN
Status: DISCONTINUED | OUTPATIENT
Start: 2018-04-17 | End: 2018-04-17

## 2018-04-17 RX ORDER — FENTANYL CITRATE 50 UG/ML
25-50 INJECTION, SOLUTION INTRAMUSCULAR; INTRAVENOUS
Status: DISCONTINUED | OUTPATIENT
Start: 2018-04-17 | End: 2018-04-17 | Stop reason: HOSPADM

## 2018-04-17 RX ORDER — BUPIVACAINE HYDROCHLORIDE 2.5 MG/ML
INJECTION, SOLUTION EPIDURAL; INFILTRATION; INTRACAUDAL PRN
Status: DISCONTINUED | OUTPATIENT
Start: 2018-04-17 | End: 2018-04-17

## 2018-04-17 RX ORDER — FENTANYL CITRATE 50 UG/ML
INJECTION, SOLUTION INTRAMUSCULAR; INTRAVENOUS PRN
Status: DISCONTINUED | OUTPATIENT
Start: 2018-04-17 | End: 2018-04-17

## 2018-04-17 RX ORDER — DIPHENHYDRAMINE HCL 25 MG
25 CAPSULE ORAL EVERY 6 HOURS PRN
Status: DISCONTINUED | OUTPATIENT
Start: 2018-04-17 | End: 2018-04-22 | Stop reason: HOSPADM

## 2018-04-17 RX ORDER — LORAZEPAM 0.5 MG/1
0.5 TABLET ORAL EVERY 4 HOURS PRN
Status: DISCONTINUED | OUTPATIENT
Start: 2018-04-17 | End: 2018-04-22 | Stop reason: HOSPADM

## 2018-04-17 RX ORDER — NALOXONE HYDROCHLORIDE 0.4 MG/ML
.1-.4 INJECTION, SOLUTION INTRAMUSCULAR; INTRAVENOUS; SUBCUTANEOUS
Status: DISCONTINUED | OUTPATIENT
Start: 2018-04-17 | End: 2018-04-18

## 2018-04-17 RX ORDER — LIDOCAINE 40 MG/G
CREAM TOPICAL
Status: DISCONTINUED | OUTPATIENT
Start: 2018-04-17 | End: 2018-04-17 | Stop reason: HOSPADM

## 2018-04-17 RX ORDER — PROPOFOL 10 MG/ML
INJECTION, EMULSION INTRAVENOUS PRN
Status: DISCONTINUED | OUTPATIENT
Start: 2018-04-17 | End: 2018-04-17

## 2018-04-17 RX ORDER — ERTAPENEM 1 G/1
1 INJECTION, POWDER, LYOPHILIZED, FOR SOLUTION INTRAMUSCULAR; INTRAVENOUS
Status: COMPLETED | OUTPATIENT
Start: 2018-04-17 | End: 2018-04-17

## 2018-04-17 RX ADMIN — PHENYLEPHRINE HYDROCHLORIDE 0.3 MCG/KG/MIN: 10 INJECTION, SOLUTION INTRAMUSCULAR; INTRAVENOUS; SUBCUTANEOUS at 10:12

## 2018-04-17 RX ADMIN — PHENYLEPHRINE HYDROCHLORIDE 100 MCG: 10 INJECTION, SOLUTION INTRAMUSCULAR; INTRAVENOUS; SUBCUTANEOUS at 09:15

## 2018-04-17 RX ADMIN — INSULIN ASPART 1 UNITS: 100 INJECTION, SOLUTION INTRAVENOUS; SUBCUTANEOUS at 20:41

## 2018-04-17 RX ADMIN — SODIUM CHLORIDE, POTASSIUM CHLORIDE, SODIUM LACTATE AND CALCIUM CHLORIDE: 600; 310; 30; 20 INJECTION, SOLUTION INTRAVENOUS at 08:00

## 2018-04-17 RX ADMIN — HYDROMORPHONE HYDROCHLORIDE 0.25 MG: 1 INJECTION, SOLUTION INTRAMUSCULAR; INTRAVENOUS; SUBCUTANEOUS at 15:08

## 2018-04-17 RX ADMIN — PHENYLEPHRINE HYDROCHLORIDE 100 MCG: 10 INJECTION, SOLUTION INTRAMUSCULAR; INTRAVENOUS; SUBCUTANEOUS at 08:41

## 2018-04-17 RX ADMIN — HYDROMORPHONE HYDROCHLORIDE 0.5 MG: 1 INJECTION, SOLUTION INTRAMUSCULAR; INTRAVENOUS; SUBCUTANEOUS at 18:00

## 2018-04-17 RX ADMIN — SODIUM CHLORIDE, POTASSIUM CHLORIDE, SODIUM LACTATE AND CALCIUM CHLORIDE 500 ML: 600; 310; 30; 20 INJECTION, SOLUTION INTRAVENOUS at 18:00

## 2018-04-17 RX ADMIN — PHENYLEPHRINE HYDROCHLORIDE 100 MCG: 10 INJECTION, SOLUTION INTRAMUSCULAR; INTRAVENOUS; SUBCUTANEOUS at 09:57

## 2018-04-17 RX ADMIN — PHENYLEPHRINE HYDROCHLORIDE 100 MCG: 10 INJECTION, SOLUTION INTRAMUSCULAR; INTRAVENOUS; SUBCUTANEOUS at 10:08

## 2018-04-17 RX ADMIN — ROCURONIUM BROMIDE 20 MG: 10 INJECTION INTRAVENOUS at 11:32

## 2018-04-17 RX ADMIN — HYDROMORPHONE HYDROCHLORIDE 0.2 MG: 1 INJECTION, SOLUTION INTRAMUSCULAR; INTRAVENOUS; SUBCUTANEOUS at 16:17

## 2018-04-17 RX ADMIN — ONDANSETRON 4 MG: 2 INJECTION INTRAMUSCULAR; INTRAVENOUS at 14:27

## 2018-04-17 RX ADMIN — FENTANYL CITRATE 25 MCG: 50 INJECTION INTRAMUSCULAR; INTRAVENOUS at 15:51

## 2018-04-17 RX ADMIN — ROCURONIUM BROMIDE 20 MG: 10 INJECTION INTRAVENOUS at 13:53

## 2018-04-17 RX ADMIN — PHENYLEPHRINE HYDROCHLORIDE 100 MCG: 10 INJECTION, SOLUTION INTRAMUSCULAR; INTRAVENOUS; SUBCUTANEOUS at 09:50

## 2018-04-17 RX ADMIN — MIDAZOLAM 1 MG: 1 INJECTION INTRAMUSCULAR; INTRAVENOUS at 07:12

## 2018-04-17 RX ADMIN — ROCURONIUM BROMIDE 50 MG: 10 INJECTION INTRAVENOUS at 08:37

## 2018-04-17 RX ADMIN — SODIUM CHLORIDE, POTASSIUM CHLORIDE, SODIUM LACTATE AND CALCIUM CHLORIDE: 600; 310; 30; 20 INJECTION, SOLUTION INTRAVENOUS at 10:36

## 2018-04-17 RX ADMIN — ERTAPENEM SODIUM 1 G: 1 INJECTION, POWDER, LYOPHILIZED, FOR SOLUTION INTRAMUSCULAR; INTRAVENOUS at 09:00

## 2018-04-17 RX ADMIN — PHENYLEPHRINE HYDROCHLORIDE 100 MCG: 10 INJECTION, SOLUTION INTRAMUSCULAR; INTRAVENOUS; SUBCUTANEOUS at 08:54

## 2018-04-17 RX ADMIN — HYDROMORPHONE HYDROCHLORIDE 0.3 MG: 1 INJECTION, SOLUTION INTRAMUSCULAR; INTRAVENOUS; SUBCUTANEOUS at 16:12

## 2018-04-17 RX ADMIN — SUGAMMADEX 150 MG: 100 INJECTION, SOLUTION INTRAVENOUS at 15:07

## 2018-04-17 RX ADMIN — FENTANYL CITRATE 50 MCG: 50 INJECTION INTRAMUSCULAR; INTRAVENOUS at 07:12

## 2018-04-17 RX ADMIN — ROCURONIUM BROMIDE 30 MG: 10 INJECTION INTRAVENOUS at 10:48

## 2018-04-17 RX ADMIN — ENOXAPARIN SODIUM 40 MG: 40 INJECTION SUBCUTANEOUS at 07:16

## 2018-04-17 RX ADMIN — PANTOPRAZOLE SODIUM 40 MG: 40 INJECTION, POWDER, FOR SOLUTION INTRAVENOUS at 14:58

## 2018-04-17 RX ADMIN — HYDROMORPHONE HYDROCHLORIDE 0.5 MG: 1 INJECTION, SOLUTION INTRAMUSCULAR; INTRAVENOUS; SUBCUTANEOUS at 17:12

## 2018-04-17 RX ADMIN — PHENYLEPHRINE HYDROCHLORIDE 100 MCG: 10 INJECTION, SOLUTION INTRAMUSCULAR; INTRAVENOUS; SUBCUTANEOUS at 09:43

## 2018-04-17 RX ADMIN — PHENYLEPHRINE HYDROCHLORIDE 100 MCG: 10 INJECTION, SOLUTION INTRAMUSCULAR; INTRAVENOUS; SUBCUTANEOUS at 10:09

## 2018-04-17 RX ADMIN — FENTANYL CITRATE 25 MCG: 50 INJECTION INTRAMUSCULAR; INTRAVENOUS at 15:43

## 2018-04-17 RX ADMIN — SODIUM CHLORIDE: 9 INJECTION, SOLUTION INTRAVENOUS at 15:50

## 2018-04-17 RX ADMIN — PHENYLEPHRINE HYDROCHLORIDE 100 MCG: 10 INJECTION, SOLUTION INTRAMUSCULAR; INTRAVENOUS; SUBCUTANEOUS at 09:29

## 2018-04-17 RX ADMIN — HYDROMORPHONE HYDROCHLORIDE 0.25 MG: 1 INJECTION, SOLUTION INTRAMUSCULAR; INTRAVENOUS; SUBCUTANEOUS at 14:25

## 2018-04-17 RX ADMIN — MIDAZOLAM 1 MG: 1 INJECTION INTRAMUSCULAR; INTRAVENOUS at 08:24

## 2018-04-17 RX ADMIN — PHENYLEPHRINE HYDROCHLORIDE 100 MCG: 10 INJECTION, SOLUTION INTRAMUSCULAR; INTRAVENOUS; SUBCUTANEOUS at 09:23

## 2018-04-17 RX ADMIN — HYDROMORPHONE HYDROCHLORIDE 0.5 MG: 1 INJECTION, SOLUTION INTRAMUSCULAR; INTRAVENOUS; SUBCUTANEOUS at 16:51

## 2018-04-17 RX ADMIN — PHENYLEPHRINE HYDROCHLORIDE 100 MCG: 10 INJECTION, SOLUTION INTRAMUSCULAR; INTRAVENOUS; SUBCUTANEOUS at 09:53

## 2018-04-17 RX ADMIN — HYDROMORPHONE HYDROCHLORIDE 0.5 MG: 1 INJECTION, SOLUTION INTRAMUSCULAR; INTRAVENOUS; SUBCUTANEOUS at 16:22

## 2018-04-17 RX ADMIN — HYDROMORPHONE HYDROCHLORIDE 0.5 MG: 1 INJECTION, SOLUTION INTRAMUSCULAR; INTRAVENOUS; SUBCUTANEOUS at 16:37

## 2018-04-17 RX ADMIN — ROCURONIUM BROMIDE 20 MG: 10 INJECTION INTRAVENOUS at 12:14

## 2018-04-17 RX ADMIN — MIDAZOLAM 1 MG: 1 INJECTION INTRAMUSCULAR; INTRAVENOUS at 08:21

## 2018-04-17 RX ADMIN — BUPIVACAINE 20 ML: 13.3 INJECTION, SUSPENSION, LIPOSOMAL INFILTRATION at 07:19

## 2018-04-17 RX ADMIN — ACETAMINOPHEN 1000 MG: 10 INJECTION, SOLUTION INTRAVENOUS at 16:58

## 2018-04-17 RX ADMIN — ROCURONIUM BROMIDE 30 MG: 10 INJECTION INTRAVENOUS at 09:22

## 2018-04-17 RX ADMIN — Medication 1 PAD.: at 12:01

## 2018-04-17 RX ADMIN — FENTANYL CITRATE 50 MCG: 50 INJECTION, SOLUTION INTRAMUSCULAR; INTRAVENOUS at 08:30

## 2018-04-17 RX ADMIN — ERTAPENEM SODIUM 1 G: 1 INJECTION, POWDER, LYOPHILIZED, FOR SOLUTION INTRAMUSCULAR; INTRAVENOUS at 20:33

## 2018-04-17 RX ADMIN — ROCURONIUM BROMIDE 20 MG: 10 INJECTION INTRAVENOUS at 10:01

## 2018-04-17 RX ADMIN — PHENYLEPHRINE HYDROCHLORIDE 100 MCG: 10 INJECTION, SOLUTION INTRAMUSCULAR; INTRAVENOUS; SUBCUTANEOUS at 10:13

## 2018-04-17 RX ADMIN — PHENYLEPHRINE HYDROCHLORIDE 200 MCG: 10 INJECTION, SOLUTION INTRAMUSCULAR; INTRAVENOUS; SUBCUTANEOUS at 10:11

## 2018-04-17 RX ADMIN — FENTANYL CITRATE 50 MCG: 50 INJECTION INTRAMUSCULAR; INTRAVENOUS at 16:04

## 2018-04-17 RX ADMIN — PHENYLEPHRINE HYDROCHLORIDE 100 MCG: 10 INJECTION, SOLUTION INTRAMUSCULAR; INTRAVENOUS; SUBCUTANEOUS at 10:04

## 2018-04-17 RX ADMIN — SODIUM CHLORIDE, POTASSIUM CHLORIDE, SODIUM LACTATE AND CALCIUM CHLORIDE 1000 ML: 600; 310; 30; 20 INJECTION, SOLUTION INTRAVENOUS at 16:37

## 2018-04-17 RX ADMIN — DEXAMETHASONE SODIUM PHOSPHATE 8 MG: 4 INJECTION, SOLUTION INTRA-ARTICULAR; INTRALESIONAL; INTRAMUSCULAR; INTRAVENOUS; SOFT TISSUE at 09:11

## 2018-04-17 RX ADMIN — SCOPALAMINE 1 PATCH: 1 PATCH, EXTENDED RELEASE TRANSDERMAL at 06:43

## 2018-04-17 RX ADMIN — BENZOCAINE, MENTHOL 1 LOZENGE: 15; 3.6 LOZENGE ORAL at 23:01

## 2018-04-17 RX ADMIN — HYDROMORPHONE HYDROCHLORIDE: 10 INJECTION, SOLUTION INTRAMUSCULAR; INTRAVENOUS; SUBCUTANEOUS at 15:49

## 2018-04-17 RX ADMIN — PROPOFOL 150 MG: 10 INJECTION, EMULSION INTRAVENOUS at 08:37

## 2018-04-17 RX ADMIN — PHENYLEPHRINE HYDROCHLORIDE 100 MCG: 10 INJECTION, SOLUTION INTRAMUSCULAR; INTRAVENOUS; SUBCUTANEOUS at 09:05

## 2018-04-17 RX ADMIN — BUPIVACAINE HYDROCHLORIDE 40 ML: 2.5 INJECTION, SOLUTION EPIDURAL; INFILTRATION; INTRACAUDAL at 07:19

## 2018-04-17 RX ADMIN — FENTANYL CITRATE 50 MCG: 50 INJECTION, SOLUTION INTRAMUSCULAR; INTRAVENOUS at 09:34

## 2018-04-17 RX ADMIN — SODIUM CHLORIDE, POTASSIUM CHLORIDE, SODIUM LACTATE AND CALCIUM CHLORIDE: 600; 310; 30; 20 INJECTION, SOLUTION INTRAVENOUS at 13:55

## 2018-04-17 RX ADMIN — SODIUM CHLORIDE 500 ML: 9 INJECTION, SOLUTION INTRAVENOUS at 23:45

## 2018-04-17 RX ADMIN — ROCURONIUM BROMIDE 20 MG: 10 INJECTION INTRAVENOUS at 12:52

## 2018-04-17 RX ADMIN — PHENYLEPHRINE HYDROCHLORIDE 100 MCG: 10 INJECTION, SOLUTION INTRAMUSCULAR; INTRAVENOUS; SUBCUTANEOUS at 10:00

## 2018-04-17 RX ADMIN — LIDOCAINE HYDROCHLORIDE 100 MG: 20 INJECTION, SOLUTION INFILTRATION; PERINEURAL at 08:38

## 2018-04-17 RX ADMIN — SODIUM CHLORIDE: 9 INJECTION, SOLUTION INTRAVENOUS at 08:50

## 2018-04-17 ASSESSMENT — LIFESTYLE VARIABLES: TOBACCO_USE: 1

## 2018-04-17 ASSESSMENT — ACTIVITIES OF DAILY LIVING (ADL): RETIRED_EATING: 0-->INDEPENDENT

## 2018-04-17 NOTE — ANESTHESIA PROCEDURE NOTES
Peripheral Nerve Block Procedure Note    Staff:     Anesthesiologist:  JESSI PIPER    Resident/CRNA:  CAITLIN SMITH    Block performed by resident/CRNA in the presence of a teaching physician    Location: Pre-op  Procedure Start/Stop TImes:      4/17/2018 7:00 AM     4/17/2018 7:19 AM    patient identified, IV checked, site marked, risks and benefits discussed, informed consent, monitors and equipment checked, pre-op evaluation, at physician/surgeon's request and post-op pain management      Correct Patient: Yes      Correct Position: Yes      Correct Site: Yes      Correct Procedure: Yes      Correct Laterality:  Yes    Site Marked:  Yes  Procedure details:     Procedure:  TAP    ASA:  2    Laterality:  Bilateral    Position:  Supine    Sterile Prep: chloraprep, patient draped, mask and sterile gloves      Needle:  Short bevel and insulated    Needle gauge:  21    Needle length (mm):  110    Ultrasound: Yes      Ultrasound used to identify targeted nerve, plexus, or vascular structure and placed a needle adjacent to it      Permanent Image entered into patiient's record      Abnormal pain on injection: No      Blood Aspirated: No      Paresthesias:  No    Bleeding at site: No      Bolus via:  Needle    Infusion Method:  Single Shot    Complications:  None

## 2018-04-17 NOTE — ANESTHESIA PREPROCEDURE EVALUATION
Anesthesia Evaluation     . Pt has had prior anesthetic. Type: General    No history of anesthetic complications          ROS/MED HX    ENT/Pulmonary:  - neg pulmonary ROS   (+)tobacco use, Past use light smoker in past packs/day  , . .    Neurologic:  - neg neurologic ROS     Cardiovascular:  - neg cardiovascular ROS   (+) ----. : . . . :. . No previous cardiac testing      (-) hypertension, CAD, taking anticoagulants/antiplatelets and dyslipidemia   METS/Exercise Tolerance: Comment: Walks at 3 MPH.  Close to 2 miles and upper arm exercises.  >4 METS   Hematologic:  - neg hematologic  ROS       Musculoskeletal:  - neg musculoskeletal ROS       GI/Hepatic: Comment: Necrotizing pancreatitis  Found to have pancreatic adenocarcinoma        Renal/Genitourinary:  - ROS Renal section negative       Endo:  - neg endo ROS       Psychiatric:  - neg psychiatric ROS       Infectious Disease:  - neg infectious disease ROS       Malignancy:   (+) Malignancy History of GI  GI CA Active status post,         Other:    (+) No chance of pregnancy no H/O Chronic Pain,                                  Anesthesia Plan      History & Physical Review  History and physical reviewed and following examination; no interval change.    ASA Status:  2 .    NPO Status:  > 8 hours    Plan for General and ETT          Postoperative Care      Consents  Anesthetic plan, risks, benefits and alternatives discussed with:  Patient..        Procedure:  Procedure(s):  Diagnostic Laparoscopy; Open Distal Pancreatectomy And Splenectomy, Anesthesia Block - Wound Class: I-Clean   - Wound Class: I-Clean    Patient Active Problem List   Diagnosis     Acute pancreatitis     Leukocytosis     Fatty liver     Pancreatic mass     Pancreatitis     Necrotizing pancreatitis     Pancreatic adenocarcinoma (H)     Dehydration     Pancreas cancer (H)       Past Medical History:   Diagnosis Date     Necrotizing pancreatitis      Pancreatic cancer (H)      PONV  (postoperative nausea and vomiting)        Past Surgical History:   Procedure Laterality Date     ABDOMEN SURGERY  1983    Ruptured tubal pregnancies, additional surgeries followed     BACK SURGERY  2004    L5, S1 discectomy     BREAST SURGERY  2003    Breast reduction     COLONOSCOPY  2008     ENDOSCOPIC RETROGRADE CHOLANGIOPANCREATOGRAM N/A 1/25/2018    Procedure: COMBINED ENDOSCOPIC RETROGRADE CHOLANGIOPANCREATOGRAPHY, PLACE TUBE/STENT;  Endoscopic retrograde cholangiopancreatogram with stent placement and cyst drainage bile ;  Surgeon: Vito Sanches MD;  Location: [facility] OR     ENDOSCOPIC ULTRASOUND LOWER GASTROINTESTIONAL TRACT (GI) N/A 1/25/2018    Procedure: ENDOSCOPIC ULTRASOUND LOWER GASTROINTESTIONAL TRACT (GI);  Endoscopic Ultrasound with guided Cyst-Gastrostomy;  Surgeon: González Metz MD;  Location: [facility] OR     ENDOSCOPIC ULTRASOUND, ESOPHAGOSCOPY, GASTROSCOPY, DUODENOSCOPY (EGD), NECROSECTOMY N/A 12/4/2017    Procedure: ENDOSCOPIC ULTRASOUND, ESOPHAGOSCOPY, GASTROSCOPY, DUODENOSCOPY (EGD), NECROSECTOMY;  Esophagogastroduodenoscopy, with  Necrosectomy and stents replacement  ;  Surgeon: González Metz MD;  Location: [facility]     ENDOSCOPIC ULTRASOUND, ESOPHAGOSCOPY, GASTROSCOPY, DUODENOSCOPY (EGD), NECROSECTOMY N/A 12/12/2017    Procedure: ENDOSCOPIC ULTRASOUND, ESOPHAGOSCOPY, GASTROSCOPY, DUODENOSCOPY (EGD), NECROSECTOMY;  Esophagogastroduodenoscopy with Necrosectomy, Balloon Dilation, stent removal X3 and Cystgastrostomy stent Placement X2;  Surgeon: Guru Cecilia Staples MD;  Location: [facility]     ESOPHAGOSCOPY, GASTROSCOPY, DUODENOSCOPY (EGD), COMBINED N/A 11/29/2017    Procedure: COMBINED ENDOSCOPIC ULTRASOUND, ESOPHAGOSCOPY, GASTROSCOPY, DUODENOSCOPY (EGD), FINE NEEDLE ASPIRATE/BIOPSY;  Endoscopic Ultrasound with cystgastrostomy and fine needle aspiration ;  Surgeon: González Metz MD;  Location: [facility] OR     GYN SURGERY  1983    Multiple ectopic pregnancies,  reconnect,  1988     INSERT PORT VASCULAR ACCESS Right 2018    Procedure: INSERT PORT VASCULAR ACCESS;  Chest Port Placement - right;  Surgeon: Chanda Lawson PA-C;  Location:  OR         No current facility-administered medications on file prior to encounter.   Current Outpatient Prescriptions on File Prior to Encounter:  amylase-lipase-protease (CREON) 33941-84200 UNITS CPEP per EC capsule Take 2-3 with meals / 1-2 with snacks, up to 15 per day.   dexamethasone (DECADRON) 4 MG tablet Take 2 tablets (8 mg) by mouth daily (with breakfast)   HYDROmorphone (DILAUDID) 2 MG tablet Take 1 tablet (2 mg) by mouth every 4 hours as needed for pain maximum 4 tablet(s) per day   HYDROcodone-acetaminophen (NORCO) 5-325 MG per tablet every 4 hours as needed    oxyCODONE IR (ROXICODONE) 5 MG tablet Take 1 tablet (5 mg) by mouth every 4 hours as needed for moderate to severe pain   ondansetron (ZOFRAN) 8 MG tablet Take 1 tablet (8 mg) by mouth every 8 hours as needed for nausea   LORazepam (ATIVAN) 0.5 MG tablet Take 1 tablet (0.5 mg) by mouth every 4 hours as needed (Anxiety, Nausea/Vomiting or Sleep)   prochlorperazine (COMPAZINE) 10 MG tablet Take 1 tablet (10 mg) by mouth every 6 hours as needed (Nausea/Vomiting)       No Known Allergies    Recent Labs   Lab Test  18   1149   HGB  11.1*     Recent Labs   Lab Test  18   1149   POTASSIUM  3.9     Recent Labs   Lab Test  18   1149   PLT  254     Recent Labs   Lab Test  18   1149   INR  0.99       No results found for this or any previous visit (from the past 4320 hour(s)).      Attending Anesthesiologist    Mao Perez MD    *[phone]                  .

## 2018-04-17 NOTE — IP AVS SNAPSHOT
MRN:[id number]                      After Visit Summary   4/17/2018    Kitty Bangura    MRN: [id number]           Thank you!     Thank you for choosing [facility] for your care. Our goal is always to provide you with excellent care. Hearing back from our patients is one way we can continue to improve our services. Please take a few minutes to complete the written survey that you may receive in the mail after you visit with us. Thank you!        Patient Information     Date Of Birth          1960        Designated Caregiver       Most Recent Value    Caregiver    Will someone help with your care after discharge? yes    Name of designated caregiver Andrew (spouse)    Phone number of caregiver [phone]    Caregiver address [address] [place], [place]      About your hospital stay     You were admitted on:  April 17, 2018 You last received care in the:  [address] [place] [address]    You were discharged on:  April 22, 2018       Who to Call     For medical emergencies, please call 911.  For non-urgent questions about your medical care, please call your primary care provider or clinic, [phone]  For questions related to your surgery, please call your surgery clinic        Attending Provider     Provider Specialty    Ja Byrd MD Surgery Surgical Oncology       Primary Care Provider Office Phone # Fax #    Williamsin Julito Mcgill [phone]      After Care Instructions     Activity       Your activity upon discharge: No lifting more than 10-15 lbs for 6 weeks. Recommend frequent ambulation.            Diet       Follow this diet upon discharge: Regular diet            Discharge Instructions       If your travel plans upon discharge include prolonged periods of sitting (a lengthy car or plane ride), it is highly beneficial to get up and walk at least once per hour to help prevent swelling and blood clots.     You may shower after operation, let warm soapy water run  "over incision and pat dry. Do not submerge, soak, or scrub incision.    Take incentive spirometer home for continued frequent use    You will be discharged with narcotic pain medications. Please take them only as needed and do not operate a car or heavy machinery for 24 hours after taking them.  Narcotic pain medications like oxycodone are constipating. Please ensure that you are drinking adequate amounts of fluids and taking stool softeners while you are taking narcotics. Take Miralax as needed for constipation.     Do not drive until you can with stand pressing the brake pedal quickly and fully without pain and you are not distracted by pain.     Call for fever greater than 101.5, chills, red skin around incision, drainage from incision, increased swelling from the incision, bleeding from the incision that is not controlled with light pressure, inability to tolerate diet,new nausea/vomiting or other new/worsening symptoms.   You may also call the surgical oncology nurse care coordinator from 8:00am-4:30pm YELENA Trujillo at [phone]. For after hours questions or concerns you can contact the on-call surgical oncology resident (nights and weekends [phone] and ask \"I would like to page the Surgical Oncology Resident on call.\"). In emergencies, call [phone]    Follow Up:  Follow up in clinic with Dr. Byrd in 1 week. Follow up with your primary care provider within 1 week for glucose monitoring. You should be called to make an appointment within 3 business days. If you are not contacted, call [phone] to make an appointment.                  Follow-up Appointments     Adult [facility] Follow-up and recommended labs and tests       Follow up with Dr. Byrd in 1 week. Follow up with you primary care provider within 1 week for glucose monitoring.     Appointments on [facility] and/or [facility] (with [facility] or [facility] provider or service). Call [phone] if you haven't heard regarding these appointments within " "7 days of discharge.                  Your next 10 appointments already scheduled     May 04, 2018  9:00 AM CDT   (Arrive by 8:45 AM)   New Patient Visit with Brandyn Snow MD   [facility] ([facility])    [address] [place] [address]  [address]  [facility] [id number] [phone]            May 14, 2018  7:45 AM CDT   (Arrive by 7:30 AM)   Return Visit with Jovany Monk MD   [facility] ([facility])    [address] [place] [address]  [address]  [facility] [id number] [phone]              Additional Information     If you use hormonal birth control (such as the pill, patch, ring or implants): You'll need a second form of birth control for 7 days (condoms, a diaphragm or contraceptive foam). While in the hospital, you received a medicine called Bridion. Your normal birth control will not work as well for a week after taking this medicine.          Pending Results     Date and Time Order Name Status Description    4/17/2018 1328 Surgical pathology exam In process             Statement of Approval     Ordered          04/22/18 0905  I have reviewed and agree with all the recommendations and orders detailed in this document.  EFFECTIVE NOW     Approved and electronically signed by:  Mao Perales MD             Admission Information     Date & Time Provider Department Dept. Phone    4/17/2018 Ja Byrd MD [address] [facility] [phone]      Your Vitals Were     Blood Pressure Pulse Temperature Respirations Height Weight    96/55 (BP Location: Right arm) 86 98.6  F (37  C) (Oral) 16 1.651 m (5' 5\") 74.8 kg (164 lb 12.8 oz)    Pulse Oximetry BMI (Body Mass Index)                95% 27.42 kg/m2          MyChart Information     [facility] gives you secure access to your electronic health record. If you see a primary care provider, you can also send messages to your care team and make appointments. If you have " questions, please call your primary care clinic.  If you do not have a primary care provider, please call [phone] and they will assist you.        Care EveryWhere ID     This is your Care EveryWhere ID. This could be used by other organizations to access your [facility] medical records  [id number]        Equal Access to Services     CHASE DE GUZMAN : Tre donovan Sopaulo, waaxda luqadaha, qaybta kaalmada marty, re yip. So [facility] [phone].    ATENCIÓN: Si habla español, tiene a quiros disposición servicios gratuitos de asistencia lingüística. Macy al [phone].    We comply with applicable federal civil rights laws and [place] laws. We do not discriminate on the basis of race, color, national origin, age, disability, sex, sexual orientation, or gender identity.               Review of your medicines      START taking        Dose / Directions    acetaminophen 500 MG tablet   Commonly known as:  TYLENOL   Used for:  Acute post-operative pain        Dose:  1000 mg   Take 2 tablets (1,000 mg) by mouth every 8 hours   Quantity:  100 tablet   Refills:  1       enoxaparin 40 MG/0.4ML injection   Commonly known as:  LOVENOX   Used for:  Pancreatic adenocarcinoma (H)        Dose:  40 mg   Inject 0.4 mLs (40 mg) Subcutaneous daily for 23 days   Quantity:  9.2 mL   Refills:  0       pantoprazole 40 MG EC tablet   Commonly known as:  PROTONIX   Used for:  Pancreatic adenocarcinoma (H)        Dose:  40 mg   Take 1 tablet (40 mg) by mouth every morning   Quantity:  30 tablet   Refills:  0       polyethylene glycol Packet   Commonly known as:  MIRALAX/GLYCOLAX   Used for:  Drug-induced constipation        Dose:  17 g   Take 17 g by mouth daily   Quantity:  30 packet   Refills:  0         CONTINUE these medicines which may have CHANGED, or have new prescriptions. If we are uncertain of the size of tablets/capsules you have at home, strength may be listed as something that might  have changed.        Dose / Directions    oxyCODONE IR 5 MG tablet   Commonly known as:  ROXICODONE   This may have changed:    - how much to take  - when to take this   Used for:  Acute post-operative pain        Dose:  5-10 mg   Take 1-2 tablets (5-10 mg) by mouth every 3 hours as needed for moderate to severe pain   Quantity:  30 tablet   Refills:  0         CONTINUE these medicines which have NOT CHANGED        Dose / Directions    albuterol 108 (90 Base) MCG/ACT Inhaler   Commonly known as:  PROAIR HFA/PROVENTIL HFA/VENTOLIN HFA   Used for:  Acute bronchitis, unspecified organism        Dose:  2 puff   Inhale 2 puffs into the lungs every 6 hours as needed for shortness of breath / dyspnea or wheezing   Quantity:  1 Inhaler   Refills:  0       amylase-lipase-protease 90082-03747 units Cpep per EC capsule   Commonly known as:  CREON   Used for:  Necrotizing pancreatitis        Take 2-3 with meals / 1-2 with snacks, up to 15 per day.   Quantity:  450 capsule   Refills:  6       LORazepam 0.5 MG tablet   Commonly known as:  ATIVAN   Used for:  Pancreatic adenocarcinoma (H)        Dose:  0.5 mg   Take 1 tablet (0.5 mg) by mouth every 4 hours as needed (Anxiety, Nausea/Vomiting or Sleep)   Quantity:  30 tablet   Refills:  2       ondansetron 8 MG tablet   Commonly known as:  ZOFRAN   Used for:  Pancreatic adenocarcinoma (H)        Dose:  8 mg   Take 1 tablet (8 mg) by mouth every 8 hours as needed for nausea   Quantity:  30 tablet   Refills:  0       prochlorperazine 10 MG tablet   Commonly known as:  COMPAZINE   Used for:  Pancreatic adenocarcinoma (H)        Dose:  10 mg   Take 1 tablet (10 mg) by mouth every 6 hours as needed (Nausea/Vomiting)   Quantity:  30 tablet   Refills:  2         STOP taking     dexamethasone 4 MG tablet   Commonly known as:  DECADRON           HYDROcodone-acetaminophen 5-325 MG per tablet   Commonly known as:  NORCO           HYDROmorphone 2 MG tablet   Commonly known as:  DILAUDID                 Where to get your medicines      These medications were sent to [facility] - [place], [place] - [address]  [address], [facility] [address]     Phone:  [phone]     acetaminophen 500 MG tablet    enoxaparin 40 MG/0.4ML injection    pantoprazole 40 MG EC tablet    polyethylene glycol Packet         Some of these will need a paper prescription and others can be bought over the counter. Ask your nurse if you have questions.     Bring a paper prescription for each of these medications     oxyCODONE IR 5 MG tablet                Protect others around you: Learn how to safely use, store and throw away your medicines at www.disposemymeds.org.        Information about OPIOIDS     PRESCRIPTION OPIOIDS: WHAT YOU NEED TO KNOW   You have a prescription for an opioid (narcotic) pain medicine. Opioids can cause addiction. If you have a history of chemical dependency of any type, you are at a higher risk of becoming addicted to opioids. Only take this medicine after all other options have been tried. Take it for as short a time and as few doses as possible.     Do not:    Drive. If you drive while taking these medicines, you could be arrested for driving under the influence (DUI).    Operate heavy machinery    Do any other dangerous activities while taking these medicines.     Drink any alcohol while taking these medicines.      Take with any other medicines that contain acetaminophen. Read all labels carefully. Look for the word  acetaminophen  or  Tylenol.  Ask your pharmacist if you have questions or are unsure.    Store your pills in a secure place, locked if possible. We will not replace any lost or stolen medicine. If you don t finish your medicine, please throw away (dispose) as directed by your pharmacist. The [place] Pollution [employer] Agency has more information about safe disposal: https://www.pca.state.[place]/living-green/managing-unwanted-medications    All opioids tend  to cause constipation. Drink plenty of water and eat foods that have a lot of fiber, such as fruits, vegetables, prune juice, apple juice and high-fiber cereal. Take a laxative (Miralax, milk of magnesia, Colace, Senna) if you don t move your bowels at least every other day.              Medication List: This is a list of all your medications and when to take them. Check marks below indicate your daily home schedule. Keep this list as a reference.      Medications           Morning Afternoon Evening Bedtime As Needed    acetaminophen 500 MG tablet   Commonly known as:  TYLENOL   Take 2 tablets (1,000 mg) by mouth every 8 hours   Last time this was given:  1,000 mg on 4/22/2018  5:20 AM                                albuterol 108 (90 Base) MCG/ACT Inhaler   Commonly known as:  PROAIR HFA/PROVENTIL HFA/VENTOLIN HFA   Inhale 2 puffs into the lungs every 6 hours as needed for shortness of breath / dyspnea or wheezing                                amylase-lipase-protease 47152-03738 units Cpep per EC capsule   Commonly known as:  CREON   Take 2-3 with meals / 1-2 with snacks, up to 15 per day.   Last time this was given:  72,000 Units on 4/22/2018  7:41 AM                                enoxaparin 40 MG/0.4ML injection   Commonly known as:  LOVENOX   Inject 0.4 mLs (40 mg) Subcutaneous daily for 23 days   Last time this was given:  40 mg on 4/21/2018 10:11 AM                                LORazepam 0.5 MG tablet   Commonly known as:  ATIVAN   Take 1 tablet (0.5 mg) by mouth every 4 hours as needed (Anxiety, Nausea/Vomiting or Sleep)                                ondansetron 8 MG tablet   Commonly known as:  ZOFRAN   Take 1 tablet (8 mg) by mouth every 8 hours as needed for nausea                                oxyCODONE IR 5 MG tablet   Commonly known as:  ROXICODONE   Take 1-2 tablets (5-10 mg) by mouth every 3 hours as needed for moderate to severe pain   Last time this was given:  5 mg on 4/22/2018  7:41 AM                                 pantoprazole 40 MG EC tablet   Commonly known as:  PROTONIX   Take 1 tablet (40 mg) by mouth every morning   Last time this was given:  40 mg on 4/22/2018  7:41 AM                                polyethylene glycol Packet   Commonly known as:  MIRALAX/GLYCOLAX   Take 17 g by mouth daily   Last time this was given:  17 g on 4/22/2018  7:41 AM                                prochlorperazine 10 MG tablet   Commonly known as:  COMPAZINE   Take 1 tablet (10 mg) by mouth every 6 hours as needed (Nausea/Vomiting)

## 2018-04-17 NOTE — ANESTHESIA CARE TRANSFER NOTE
Patient: Kitty Bangura    Procedure(s):  Diagnostic Laparoscopy; Open Distal Pancreatectomy And Splenectomy, splenic flexure mobilization, cholecystectomy, intraoperative ultrasound - Wound Class: II-Clean Contaminated   - Wound Class: I-Clean    Diagnosis: Pancreas Cancer   Diagnosis Additional Information: No value filed.    Anesthesia Type:   No value filed.     Note:  Airway :Face Mask  Patient transferred to:PACU  Comments: Pt transported to PACU with 6L O2 through facemask. VSS. Temp 98.3 oral. Report given to PACU RNHandoff Report: Identifed the Patient, Identified the Reponsible Provider, Reviewed the pertinent medical history, Discussed the surgical course, Reviewed Intra-OP anesthesia mangement and issues during anesthesia, Set expectations for post-procedure period and Allowed opportunity for questions and acknowledgement of understanding      Vitals: (Last set prior to Anesthesia Care Transfer)    CRNA VITALS  4/17/2018 1451 - 4/17/2018 1535      4/17/2018             Resp Rate (set): 10                Electronically Signed By: Natalie Jon  April 17, 2018  3:35 PM

## 2018-04-17 NOTE — ANESTHESIA CARE TRANSFER NOTE
Patient: Kitty Bangura    Procedure(s):  Diagnostic Laparoscopy; Open Distal Pancreatectomy And Splenectomy, splenic flexure mobilization, cholecystectomy, intraoperative ultrasound - Wound Class: II-Clean Contaminated   - Wound Class: I-Clean    Diagnosis: Pancreas Cancer   Diagnosis Additional Information: No value filed.    Anesthesia Type:   No value filed.     Note:  Airway :Face Mask  Patient transferred to:PACU  Comments: Oropharynx suctioned. spont resp. Extubated. Exchanging well. To PACU. Report to RN at bedside. Handoff Report: Identifed the Patient, Identified the Reponsible Provider, Reviewed the pertinent medical history, Discussed the surgical course, Reviewed Intra-OP anesthesia mangement and issues during anesthesia, Set expectations for post-procedure period and Allowed opportunity for questions and acknowledgement of understanding      Vitals: (Last set prior to Anesthesia Care Transfer)    CRNA VITALS  4/17/2018 1451 - 4/17/2018 1538      4/17/2018             Resp Rate (set): 10                Electronically Signed By: TYLER Arellano CRNA  April 17, 2018  3:38 PM

## 2018-04-17 NOTE — OP NOTE
DATE OF SURGERY: April 17, 2018     SURGEON: Ja Byrd MD  ASSISTANT: MD Jani Khanna Ma, MD (served as the first assistant during the critical portions of the procedure, due to the complexity of the case there is no qualified resident)    PREOPERATIVE DIAGNOSIS: Pancreatic adenocarcinoma and necrotizing pancreatitis.  POSTOPERATIVE DIAGNOSIS: Pancreatic adenocarcinoma and necrotizing pancreatitis.    PROCEDURE: Diagnostic laparoscopy, splenic flexure mobilization, intraoperative ultrasound, distal pancreatectomy, splenectomy, and cholecystectomy    Modified 22 due to the patient's recent necrotizing pancreatitis that required multiple endoscopic debridements a transgastric drain into the fluid collection which remains in place, which resulted in extensive fibrosis and scarring of the retroperitoneum and pancreas.    ANESTHESIA: General    INDICATIONS:  Mrs. Bangura is a 57-year-old woman who presented with necrotizing pancreatitis in December 2017.  She required multiple endoscopic debridements and drainage procedures to control her necrotizing pancreatitis.  On her initial EUS she was noted to have a cystic mass within the tail of her pancreas that also had solid components.  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  After her necrotizing pancreatitis was resolving clinically and her debridements have been completed, she was started on neoadjuvant chemotherapy.  This was done not only to treat potential micrometastatic disease and her tumor but also to allow more time to elapse from her necrotizing pancreatitis.  I thought we could make this a much safer procedure for her by allowing this time to pass.  She received 4 cycles of FOLFIRINOX had no evidence of tumor progression on repeat imaging, and her peripancreatic fluid collection seem to be controlled with a transgastric drain the pancreatic duct stent.  Of note, the portion of the head neck and body of  the pancreas was completely replaced by fibrotic tissue.  I offered her a distal pancreatectomy and splenectomy for her pancreas cancer and after thoroughly discussing all the potential risks she agreed to proceed with surgery.    FINDINGS:   There was no evidence of peritoneal disease or hepatic metastases on diagnostic laparoscopy.  There is extensive fibrosis and scarring within the retroperitoneum surrounding the area of her necrotizing pancreatitis.  On intraoperative ultrasound a mass within the body/tail the pancreas was largely cystic.  We also used intraoperative ultrasound to look at her venous and arterial anatomy and the surrounding area.  The inferior mesenteric vein drains into the superior mesenteric vein.  At the location was a splenic vein drains into the superior mesenteric vein, the splenic vein appeared to be somewhat narrowed.  I presume this was due to the extensive fibrotic changes in this area.    PROCEDURE IN DETAIL:  The patient received a TAP block preoperatively.  She was taken to the operating room placed on table in supine position and general anesthesia was administered.  She received preoperative antibiotics and Lovenox for VTE prophylaxis.  She also had SCDs in place.  A timeout was performed prior to beginning the procedure.    A 10 mm skin incision was made in the supraumbilical location.  Peritoneal cavity was entered and pneumoperitoneum was established.  The laparoscope was inserted into the abdomen and no evidence of peritoneal or hepatic metastases were identified.  We then performed an upper midline incision.  It became obvious that this incision was not to be large enough to allow us to complete this difficult procedure, thus made a transverse extension towards the patient's left flank half-way between the anterior superior iliac spine and the left costal margin.  This incision provided ample exposure.  Next we placed the Espinoza retractor.    First, we entered the lesser  sac through the avascular plane between the omentum and transverse mesocolon near the splenic flexure.  We continued to open this plane both medially and laterally.  Next, we performed a complete mobilization of the splenic splenic flexure of the colon.  We then retracted the splenic flexure and distal transverse colon caudally under a malleable retractor.  We mobilized the inferior border of the pancreas and identified the pancreatic cyst/cancer.  We continued this mobilization until we identified the inferior mesenteric vein as it drained into the superior mesenteric vein.      Next, we divided the omentum along the greater curve of the stomach until we encountered the short gastric vessels.  We divided the short gastric vessels between combination of silk ties and the LigaSure device.  We carried this dissection all the way up to the angle of His.    The most difficult portion of this procedure was mobilization of the inferior and superior borders of the pancreas as we approached the neck/body of the pancreas, because this was an area involved with the pancreatic necrosis.  We were able to see a portion of the infrapancreatic superior mesenteric vein.  It was difficult/nearly impossible to ever visualize the splenic vein although we knew where it was based upon anatomic landmarks intraoperative ultrasound.  We were able to identify the splenic artery which was initially divided between silk ties.  It was obvious due to the extensive fibrosis that there was no remaining pancreatic neck there was no way to develop a plane over the vessels at this location.  The only location for that was feasible for pancreatic transection was a a few centimeters proximal to the pancreatic mass.  The chosen plane of transection was to the patient's left of the superior mesenteric vein.  In the chosen transection plane but still leave a small portion of viable pancreas adherent/plastered to this vein.  During this portion of  procedure there was some blood loss from a small hole in the splenic vein.  This was controlled with Prolene sutures.  We decided the best approach was to divide the pancreas with electrocautery through the anterior portion and staple the posterior portion of pancreas and continuity with the splenic vein.  We divided the pancreas using electrocautery identified the pancreatic duct which was under significant pressure and drained a significant amount of pancreatic fluid.  The fluid was clear in appearance.  We then fired a TA 30 vascular load on the posterior portion of the pancreas including the splenic vein.  The splenic vein and the specimen side was then oversewn.    The pancreas was mobilized from the retroperitoneum and Gerota's fascia out towards the splenic hilum.  We then freed the spleno renal and spleen no diaphragmatic attachments using electrocautery.  There were 2 more short gastrics that had to be taken up to the level of his.  And then the specimen was able to be removed.  The specimen was passed off the field as distal pancreatectomy splenectomy and associated omentum.  We irrigated the left upper quadrant and were happy with our hemostasis.    There was a serosal tear on the stomach which was repaired with silk Lembert sutures.  All of the short gastrics were hemostatic.  The tiny remaining pancreas did have a pancreatic duct which was oversewn with a 4-0 Prolene in figure-of-eight fashion.  We never encountered the transgastric drain so it was left in place.  We once again irrigated after left upper quadrant and to ensure there was meticulous hemostasis.  Next, we placed a 19 Moldovan Akin drain.  Lastly we performed a timeout prior to closure to ensure all instruments and lap pads were removed from the peritoneal cavity.    We closed the fascia with oh loop PDS in running fashion on the transverse and upper midline component.  We used 0 Vicryl sutures interrupted to reapproximate the fascia near  the xiphoid and upper portion of the incision.  We then used a second oh looped PDS suture to close the anterior layer including the anterior rectus sheath on the transverse component of the incision.  There were some 0 Vicryl retention sutures were placed in the periumbilical region as well.  We irrigated the wound and then the deep dermal layer was reapproximated with 3-0 Vicryl sutures.  The skin edges were reapproximated with 4-0 Monocryl in running subcuticular fashion and Dermabond was applied to the skin.  A drain stitch was placed to ensure the drain was held in place.    The patient tolerated the procedure well.  I was present and scrubbed for the entire procedure.    EBL: 500ml    SPECIMEN(S):  1. Distal pancreas, spleen, and omentum.  2. Gallbadder    COUNTS:  Recorded as correct x 2.    POSTOPERATIVE PLANS:  Transfer to PACU with plans to be admitted to a floor bed.    Ja Byrd MD    Division of Surgical Oncology  [facility]

## 2018-04-17 NOTE — IP AVS SNAPSHOT
[address] [address]    Phone:  [phone]                                       After Visit Summary   4/17/2018    Kitty Bangura    MRN: [id number]           After Visit Summary Signature Page     I have received my discharge instructions, and my questions have been answered. I have discussed any challenges I see with this plan with the nurse or doctor.    ..........................................................................................................................................  Patient/Patient Representative Signature      ..........................................................................................................................................  Patient Representative Print Name and Relationship to Patient    ..................................................               ................................................  Date                                            Time    ..........................................................................................................................................  Reviewed by Signature/Title    ...................................................              ..............................................  Date                                                            Time

## 2018-04-17 NOTE — BRIEF OP NOTE
[facility] [place]    Brief Operative Note    Pre-operative diagnosis: Pancreas Cancer   Post-operative diagnosis Same   Procedure: Procedure(s):  Diagnostic Laparoscopy; Open Distal Pancreatectomy And Splenectomy, splenic flexure mobilization, cholecystectomy, intraoperative ultrasound - Wound Class: II-Clean Contaminated   - Wound Class: I-Clean  Surgeon: Surgeon(s) and Role:     * Ja Byrd MD - Primary     * Jani Cisneros MD - Assisting     * Jey Hunter MD - Resident - Assisting  Anesthesia: Combined General with Block   Estimated blood loss: 500 ml  Drains: Fabricio-Haines  Specimens:   ID Type Source Tests Collected by Time Destination   A : distal pancreas, spleen, and omentum Tissue Pancreas SURGICAL PATHOLOGY EXAM Ja Byrd MD 4/17/2018  1:24 PM    B : gallbladder Organ Gallbladder and Contents SURGICAL PATHOLOGY EXAM Ja Byrd MD 4/17/2018  2:01 PM      Findings:   Grossly negative margins. DANIA drain left in LUQ   Complications: None.  Implants: None.

## 2018-04-17 NOTE — ANESTHESIA POSTPROCEDURE EVALUATION
Patient: Kitty Bangura    Procedure(s):  Diagnostic Laparoscopy; Open Distal Pancreatectomy And Splenectomy, splenic flexure mobilization, cholecystectomy, intraoperative ultrasound - Wound Class: II-Clean Contaminated   - Wound Class: I-Clean    Diagnosis:Pancreas Cancer   Diagnosis Additional Information: No value filed.    Anesthesia Type:  No value filed.    Note:  Anesthesia Post Evaluation    Patient location during evaluation: PACU  Patient participation: Able to fully participate in evaluation  Level of consciousness: awake and alert  Pain management: adequate  Airway patency: patent  Cardiovascular status: acceptable and hemodynamically stable  Respiratory status: acceptable  Hydration status: acceptable  PONV: none     Anesthetic complications: None          Last vitals:  Vitals:    04/17/18 0710 04/17/18 0715 04/17/18 0720   BP: 98/66 91/62    Resp: 11 15 16   Temp:      SpO2: 100% 100% 100%         Electronically Signed By: Mao Parker MD  April 17, 2018  3:37 PM

## 2018-04-17 NOTE — OR NURSING
Dr. Byrd at bedside to check in with pt.  Stated will speak with pt later regarding the procedure once a little more awake.

## 2018-04-18 ENCOUNTER — APPOINTMENT (OUTPATIENT)
Dept: PHYSICAL THERAPY | Facility: CLINIC | Age: 58
DRG: 405 | End: 2018-04-18
Attending: STUDENT IN AN ORGANIZED HEALTH CARE EDUCATION/TRAINING PROGRAM
Payer: COMMERCIAL

## 2018-04-18 PROBLEM — D69.6 THROMBOCYTOPENIA (H): Status: ACTIVE | Noted: 2018-04-18

## 2018-04-18 PROBLEM — I95.9 HYPOTENSION: Status: ACTIVE | Noted: 2018-04-18

## 2018-04-18 PROBLEM — D64.9 ANEMIA: Status: ACTIVE | Noted: 2018-04-18

## 2018-04-18 PROBLEM — R73.9 HYPERGLYCEMIA: Status: ACTIVE | Noted: 2018-04-18

## 2018-04-18 PROBLEM — E87.0 HYPERNATREMIA: Status: ACTIVE | Noted: 2018-04-18

## 2018-04-18 LAB
ALBUMIN SERPL-MCNC: 2.4 G/DL (ref 3.4–5)
AMYLASE FLD-CCNC: 26 U/L
AMYLASE SERPL-CCNC: 27 U/L (ref 30–110)
ANION GAP SERPL CALCULATED.3IONS-SCNC: 9 MMOL/L (ref 3–14)
BUN SERPL-MCNC: 16 MG/DL (ref 7–30)
CALCIUM SERPL-MCNC: 7.7 MG/DL (ref 8.5–10.1)
CHLORIDE SERPL-SCNC: 116 MMOL/L (ref 94–109)
CO2 SERPL-SCNC: 21 MMOL/L (ref 20–32)
CREAT SERPL-MCNC: 0.71 MG/DL (ref 0.52–1.04)
ERYTHROCYTE [DISTWIDTH] IN BLOOD BY AUTOMATED COUNT: 16.5 % (ref 10–15)
GFR SERPL CREATININE-BSD FRML MDRD: 84 ML/MIN/1.7M2
GLUCOSE BLDC GLUCOMTR-MCNC: 116 MG/DL (ref 70–99)
GLUCOSE BLDC GLUCOMTR-MCNC: 129 MG/DL (ref 70–99)
GLUCOSE BLDC GLUCOMTR-MCNC: 158 MG/DL (ref 70–99)
GLUCOSE BLDC GLUCOMTR-MCNC: 167 MG/DL (ref 70–99)
GLUCOSE BLDC GLUCOMTR-MCNC: 94 MG/DL (ref 70–99)
GLUCOSE BLDC GLUCOMTR-MCNC: 97 MG/DL (ref 70–99)
GLUCOSE SERPL-MCNC: 159 MG/DL (ref 70–99)
HCT VFR BLD AUTO: 25.6 % (ref 35–47)
HGB BLD-MCNC: 7 G/DL (ref 11.7–15.7)
HGB BLD-MCNC: 7.7 G/DL (ref 11.7–15.7)
HGB BLD-MCNC: 8 G/DL (ref 11.7–15.7)
LACTATE BLD-SCNC: 1.1 MMOL/L (ref 0.4–1.9)
MAGNESIUM SERPL-MCNC: 2 MG/DL (ref 1.6–2.3)
MCH RBC QN AUTO: 31.5 PG (ref 26.5–33)
MCHC RBC AUTO-ENTMCNC: 31.3 G/DL (ref 31.5–36.5)
MCV RBC AUTO: 101 FL (ref 78–100)
PHOSPHATE SERPL-MCNC: 4.9 MG/DL (ref 2.5–4.5)
PLATELET # BLD AUTO: 148 10E9/L (ref 150–450)
POTASSIUM SERPL-SCNC: 4.7 MMOL/L (ref 3.4–5.3)
RBC # BLD AUTO: 2.54 10E12/L (ref 3.8–5.2)
SODIUM SERPL-SCNC: 146 MMOL/L (ref 133–144)
SPECIMEN SOURCE FLD: NORMAL
WBC # BLD AUTO: 15.5 10E9/L (ref 4–11)

## 2018-04-18 PROCEDURE — 36592 COLLECT BLOOD FROM PICC: CPT | Performed by: SURGERY

## 2018-04-18 PROCEDURE — 40000193 ZZH STATISTIC PT WARD VISIT: Performed by: PHYSICAL THERAPIST

## 2018-04-18 PROCEDURE — 25000125 ZZHC RX 250: Performed by: STUDENT IN AN ORGANIZED HEALTH CARE EDUCATION/TRAINING PROGRAM

## 2018-04-18 PROCEDURE — 97110 THERAPEUTIC EXERCISES: CPT | Mod: GP | Performed by: PHYSICAL THERAPIST

## 2018-04-18 PROCEDURE — 97530 THERAPEUTIC ACTIVITIES: CPT | Mod: GP | Performed by: PHYSICAL THERAPIST

## 2018-04-18 PROCEDURE — 82040 ASSAY OF SERUM ALBUMIN: CPT | Performed by: STUDENT IN AN ORGANIZED HEALTH CARE EDUCATION/TRAINING PROGRAM

## 2018-04-18 PROCEDURE — 25000132 ZZH RX MED GY IP 250 OP 250 PS 637: Performed by: SURGERY

## 2018-04-18 PROCEDURE — 86901 BLOOD TYPING SEROLOGIC RH(D): CPT | Performed by: STUDENT IN AN ORGANIZED HEALTH CARE EDUCATION/TRAINING PROGRAM

## 2018-04-18 PROCEDURE — 25000132 ZZH RX MED GY IP 250 OP 250 PS 637: Performed by: STUDENT IN AN ORGANIZED HEALTH CARE EDUCATION/TRAINING PROGRAM

## 2018-04-18 PROCEDURE — 12000008 ZZH R&B INTERMEDIATE UMMC

## 2018-04-18 PROCEDURE — 25000128 H RX IP 250 OP 636: Performed by: STUDENT IN AN ORGANIZED HEALTH CARE EDUCATION/TRAINING PROGRAM

## 2018-04-18 PROCEDURE — 00000146 ZZHCL STATISTIC GLUCOSE BY METER IP

## 2018-04-18 PROCEDURE — 85018 HEMOGLOBIN: CPT | Performed by: SURGERY

## 2018-04-18 PROCEDURE — 85018 HEMOGLOBIN: CPT | Performed by: STUDENT IN AN ORGANIZED HEALTH CARE EDUCATION/TRAINING PROGRAM

## 2018-04-18 PROCEDURE — 36592 COLLECT BLOOD FROM PICC: CPT | Performed by: STUDENT IN AN ORGANIZED HEALTH CARE EDUCATION/TRAINING PROGRAM

## 2018-04-18 PROCEDURE — 82150 ASSAY OF AMYLASE: CPT | Performed by: STUDENT IN AN ORGANIZED HEALTH CARE EDUCATION/TRAINING PROGRAM

## 2018-04-18 PROCEDURE — 97116 GAIT TRAINING THERAPY: CPT | Mod: GP | Performed by: PHYSICAL THERAPIST

## 2018-04-18 PROCEDURE — 85027 COMPLETE CBC AUTOMATED: CPT | Performed by: STUDENT IN AN ORGANIZED HEALTH CARE EDUCATION/TRAINING PROGRAM

## 2018-04-18 PROCEDURE — 86900 BLOOD TYPING SEROLOGIC ABO: CPT | Performed by: STUDENT IN AN ORGANIZED HEALTH CARE EDUCATION/TRAINING PROGRAM

## 2018-04-18 PROCEDURE — 83605 ASSAY OF LACTIC ACID: CPT | Performed by: SURGERY

## 2018-04-18 PROCEDURE — P9054 BLOOD, L/R, FROZ/DEGLY/WASH: HCPCS | Performed by: PHYSICIAN ASSISTANT

## 2018-04-18 PROCEDURE — 80069 RENAL FUNCTION PANEL: CPT | Performed by: STUDENT IN AN ORGANIZED HEALTH CARE EDUCATION/TRAINING PROGRAM

## 2018-04-18 PROCEDURE — 83735 ASSAY OF MAGNESIUM: CPT | Performed by: STUDENT IN AN ORGANIZED HEALTH CARE EDUCATION/TRAINING PROGRAM

## 2018-04-18 PROCEDURE — 97161 PT EVAL LOW COMPLEX 20 MIN: CPT | Mod: GP | Performed by: PHYSICAL THERAPIST

## 2018-04-18 PROCEDURE — 99231 SBSQ HOSP IP/OBS SF/LOW 25: CPT | Performed by: NURSE PRACTITIONER

## 2018-04-18 PROCEDURE — 86850 RBC ANTIBODY SCREEN: CPT | Performed by: STUDENT IN AN ORGANIZED HEALTH CARE EDUCATION/TRAINING PROGRAM

## 2018-04-18 PROCEDURE — P9047 ALBUMIN (HUMAN), 25%, 50ML: HCPCS | Performed by: STUDENT IN AN ORGANIZED HEALTH CARE EDUCATION/TRAINING PROGRAM

## 2018-04-18 RX ORDER — PANTOPRAZOLE SODIUM 40 MG/1
40 TABLET, DELAYED RELEASE ORAL EVERY MORNING
Status: DISCONTINUED | OUTPATIENT
Start: 2018-04-19 | End: 2018-04-22 | Stop reason: HOSPADM

## 2018-04-18 RX ORDER — ACETAMINOPHEN 500 MG
1000 TABLET ORAL EVERY 8 HOURS
Status: DISCONTINUED | OUTPATIENT
Start: 2018-04-18 | End: 2018-04-19

## 2018-04-18 RX ORDER — ALBUMIN (HUMAN) 12.5 G/50ML
12.5 SOLUTION INTRAVENOUS ONCE
Status: COMPLETED | OUTPATIENT
Start: 2018-04-18 | End: 2018-04-18

## 2018-04-18 RX ORDER — SODIUM CHLORIDE, SODIUM LACTATE, POTASSIUM CHLORIDE, CALCIUM CHLORIDE 600; 310; 30; 20 MG/100ML; MG/100ML; MG/100ML; MG/100ML
INJECTION, SOLUTION INTRAVENOUS CONTINUOUS
Status: DISCONTINUED | OUTPATIENT
Start: 2018-04-18 | End: 2018-04-20

## 2018-04-18 RX ADMIN — TOPICAL ANESTHETIC 0.5 ML: 200 SPRAY DENTAL; PERIODONTAL at 01:39

## 2018-04-18 RX ADMIN — BENZOCAINE, MENTHOL 1 LOZENGE: 15; 3.6 LOZENGE ORAL at 06:27

## 2018-04-18 RX ADMIN — SODIUM CHLORIDE, POTASSIUM CHLORIDE, SODIUM LACTATE AND CALCIUM CHLORIDE: 600; 310; 30; 20 INJECTION, SOLUTION INTRAVENOUS at 17:28

## 2018-04-18 RX ADMIN — ACETAMINOPHEN 1000 MG: 500 TABLET, FILM COATED ORAL at 16:44

## 2018-04-18 RX ADMIN — INSULIN ASPART 1 UNITS: 100 INJECTION, SOLUTION INTRAVENOUS; SUBCUTANEOUS at 04:28

## 2018-04-18 RX ADMIN — SODIUM CHLORIDE, POTASSIUM CHLORIDE, SODIUM LACTATE AND CALCIUM CHLORIDE: 600; 310; 30; 20 INJECTION, SOLUTION INTRAVENOUS at 06:47

## 2018-04-18 RX ADMIN — ACETAMINOPHEN 1000 MG: 10 INJECTION, SOLUTION INTRAVENOUS at 07:46

## 2018-04-18 RX ADMIN — INSULIN ASPART 1 UNITS: 100 INJECTION, SOLUTION INTRAVENOUS; SUBCUTANEOUS at 01:09

## 2018-04-18 RX ADMIN — PANTOPRAZOLE SODIUM 40 MG: 40 INJECTION, POWDER, FOR SOLUTION INTRAVENOUS at 07:46

## 2018-04-18 RX ADMIN — ALBUMIN HUMAN 12.5 G: 0.25 SOLUTION INTRAVENOUS at 10:24

## 2018-04-18 RX ADMIN — BENZOCAINE, MENTHOL 1 LOZENGE: 15; 3.6 LOZENGE ORAL at 08:46

## 2018-04-18 RX ADMIN — BENZOCAINE, MENTHOL 1 LOZENGE: 15; 3.6 LOZENGE ORAL at 03:23

## 2018-04-18 RX ADMIN — SODIUM CHLORIDE: 9 INJECTION, SOLUTION INTRAVENOUS at 04:28

## 2018-04-18 RX ADMIN — TOPICAL ANESTHETIC 0.5 ML: 200 SPRAY DENTAL; PERIODONTAL at 05:33

## 2018-04-18 RX ADMIN — ACETAMINOPHEN 1000 MG: 10 INJECTION, SOLUTION INTRAVENOUS at 00:45

## 2018-04-18 RX ADMIN — HYDROMORPHONE HYDROCHLORIDE: 10 INJECTION, SOLUTION INTRAMUSCULAR; INTRAVENOUS; SUBCUTANEOUS at 15:49

## 2018-04-18 ASSESSMENT — PAIN DESCRIPTION - DESCRIPTORS
DESCRIPTORS: ACHING

## 2018-04-18 NOTE — PLAN OF CARE
Problem: Patient Care Overview  Goal: Plan of Care/Patient Progress Review  Discharge Planner PT   Patient plan for discharge: Home with assist  Current status: Pt tx supine>sit at EOB with min A and tx sit<>stand at CGA with light UE support. Pt amb 20 ft with FWW at CGA-SBA, limited activity tolerance d/t dizziness and increased pain. Pt orthostatic with initial tx to stand, improved with prolonged upright positioning. Encouraged pt to take 2-3 more walks this day with Ax1 from ns and FWW for stability.  Barriers to return to prior living situation: Decreased activity tolerance, instability, medical status. Pt has good support at home, only 1 step to enter.  Recommendations for discharge: Home with assist  Rationale for recommendations: Anticipate increased independence and activity tolerance with mobility as hemodynamic stability and pain management improves. Pt has good family support at home, active at baseline.       Entered by: Kathy Hunt 04/18/2018 9:54 AM

## 2018-04-18 NOTE — PLAN OF CARE
Problem: Patient Care Overview  Goal: Plan of Care/Patient Progress Review  Outcome: No Change  Pt is POD #1. Pt is asymptomatic with BPs soft (70s-80s/40s-50s), MD notified and labs drawn, all other vital signs stable. A&Ox4. Apical pulse regular. Lungs CTA. Abdominal incision c/d/i, erythema around incision noted. Bowel sounds hypoactive in all quadrants, no gas/no BM. Tolerating NPO+ice chips diet. Hudson draining with adequate output. DANIA with serous drainage. NG to LIS with brown/green output. MIVF running at 125cc/hr. Pain controlled with PCA Dilaudid [id number]. BG checked Q4H, insulin given x2 overnight. Continue with POC. MD would like to be notified with MAP <60 and urine output <35cc/hr.

## 2018-04-18 NOTE — PROVIDER NOTIFICATION
On-call MD Donnie Cisneros paged regarding soft blood pressures (80s/40s) with MAP 52 despite getting a fluid bolus. Awaiting further instructions.

## 2018-04-18 NOTE — PROGRESS NOTES
"[facility]  SUBJECTIVE  Interval history: Patient reports pain adequately controlled with current analgesics (see below) and nerve block.  Currently rating pain 5/10 at rest and 9/10 with activity.  Patient is NPO, denies nausea.  Denies any weakness, paresthesias, circumoral numbness, metallic taste or tinnitus.       Clinically Aligned Pain Assessment (CAPA):  Comfort (How is your pain?): Tolerable with discomfort  Change in Pain (Since your last medication/intervention?): About the same  Pain Control (How are your pain treatments working?):  Partially effective pain control    Medications related to Pain Management (Future)    Start     Dose/Rate Route Frequency Ordered Stop    04/18/18 0715  HYDROmorphone (DILAUDID) PCA 1 mg/mL OPIOID TOLERANT       Intravenous CONTINUOUS 04/18/18 0711 04/17/18 1910  bupivacaine liposome (EXPAREL) LONG ACTING injection was administered into the infiltration site to produce postsurgical analgesia. Duration of action is up to 72 hours, and other \"rodriguez\" medications should not be given for 96 hours with the exception of the lidocaine 5% patch (LIDODERM) and the lidocaine 10mg in potassium infusions. This entry is for INFORMATION ONLY.       Does not apply CONTINUOUS PRN 04/17/18 1910 04/21/18 1909 04/17/18 1910  diphenhydrAMINE (BENADRYL) capsule 25 mg      25 mg Oral EVERY 6 HOURS PRN 04/17/18 1910 04/17/18 1910  diphenhydrAMINE (BENADRYL) injection 25 mg      25 mg  over 1-2 Minutes Intravenous EVERY 6 HOURS PRN 04/17/18 1910 04/17/18 1910  lidocaine 1 % 1 mL      1 mL Other EVERY 1 HOUR PRN 04/17/18 1910 04/17/18 1910  lidocaine (LMX4) kit       Topical EVERY 1 HOUR PRN 04/17/18 1910 04/17/18 1910  cyclobenzaprine (FLEXERIL) tablet 10 mg      10 mg Oral 3 TIMES DAILY PRN 04/17/18 1910 04/17/18 1907  LORazepam (ATIVAN) tablet 0.5 mg      0.5 mg Oral EVERY 4 HOURS PRN 04/17/18 1907 04/17/18 1645  acetaminophen (OFIRMEV) " "infusion 1,000 mg      1,000 mg  400 mL/hr over 15 Minutes Intravenous EVERY 8 HOURS 04/17/18 1641 04/19/18 1644          OBJECTIVE:    /46 (BP Location: Left arm)  Pulse 65  Temp 97.8  F (36.6  C) (Oral)  Resp 18  Ht 1.651 m (5' 5\")  Wt 76.4 kg (168 lb 6.4 oz)  SpO2 98%  BMI 28.02 kg/m2  Exam:  General: alert, no distress and cooperative  Neuro: Strength 5/5 and grossly symmetric    ASSESSMENT/PLAN:    Kitty Bangura is a 57 year old female with pancreas cancer, now POD #1 s/p  LAPAROSCOPY DIAGNOSTIC (GENERAL)  PANCREATECTOMY, SPLENECTOMY with single shot injection bilateral transversus abdominis plane (TAP) nerve block.  Total bupivacaine 0.25% with epinephrine 1:200,000 40mL total and liposomal (long-acting) bupivacaine (Exparel) 1.3% 20mL total administered 4/17/18 for postop pain control.  Pt is ambulating without difficulty.  No evidence of adverse side effects associated with B/L TAP nerve block injections.  Pt acheiving adequate pain control, with nerve block, oral and IV analgesics.  Anticipate up to 72 hours of pain control with long-acting local anesthetic.  Pt will continue to require multimodal analgesia for visceral/muscle/musculoskeletal pain not controlled with long-acting local anesthetic.      - NO other local anesthetic use within 96 hours of liposomal bupivacaine (Exparel)  - patient received verbal and written instructions about liposomal bupivacaine and counseling about pharmacologic and nonpharmacologic measures for acute postoperative pain management  - please call [facility] if questions or concerns    TYLER Gambino CNP  [facility] ([facility])  4/18/2018 12:14 PM    [facility] Contact Info (for in-house use only):  Job code ID: [id number]   [facility] [id number]  Peds [phone]  [place] phone: dial [phone], enter jobcode ID, then enter call-back number.    Text: Use [employer] on the Intranet <Paging/Directory> tab and enter Jobcode ID.   If no call back at any time, contact the " hospital  and ask for [facility] attending or backup

## 2018-04-18 NOTE — PROGRESS NOTES
"24 hour events  Post op hypernatremia and mild hypotension - post op given 1.5 L bolus    Subjective  Patient did well overnight. Pain well controlled, with her nose pain the most concerning. She denied nausea, SOB, chest pain. She took a couple steps yesterday but felt a little \"woozy\". She has not passed gas or developed an appetite.     Objective  BP (!) 88/41  Pulse 74  Temp 97.2  F (36.2  C) (Oral)  Resp 12  Ht 1.651 m (5' 5\")  Wt 72.5 kg (159 lb 13.3 oz)  SpO2 95%  BMI 26.6 kg/m2    UOP- 0.6cc/kg/hr  NGT- 160 cc  Drain- 65 cc    Physical exam  General- Woman resting comfortably in bed  Abd- soft, non-distended, appropriately tender. Incision well approximated with suture and dermabond without drainage, mild erythema  Drain- minimal serosang fluid  Demarco- straw colored urine    Labs  WBC 20>15  Hbg 9.6>8   Na 141>145>146  Ca> 7.7  EKG - sinus rythmn    Assessment and plan  Kitty Bangura is a 57f h/o necrotizing pancreatits s/p cystgastrostomy (1/20) pancreatic cancer POD#1 exploratory laparoscopy, distal pancreatectomy, spelenectomy, and cholecystectomy  who is doing well post op    FEN- NPO, 100 ml/hr LR, 500cc albumin replacement, replace electrolytes prn  Neuro - pain: Dilaudid pca, scheduled iv Tylenol, pta Ativan prn  CV- mild hypotension- patient usually runs 110-90 sys, check serum albumin, 500cc albumin replacement  Resp- FABIAN- on room air. continue pta albuterol  GI- ppx PPI, hold home enzyme replacement while npo, NGT in place, anti emetics prn  - demarco in place until POD#2  Endo- fabian, blood sugar >200 corrected with aspart likely 2/2 decadron, hold home decadron  Heme- Hbg 8- ppx lovenox held   ID- afebrile, s/p pre op abx, immed iate post IP WBC 20 likely inflammatory rxn to surgery/ decadron  LDA- pot a cath, luq drain, ngt, Demarco, cystgrastrostomy  Dispo- likely 5-7 days    Ana Tanner  MS3  "

## 2018-04-18 NOTE — ANESTHESIA POSTPROCEDURE EVALUATION
Patient: Kitty Bangura    Procedure(s):  Diagnostic Laparoscopy; Open Distal Pancreatectomy And Splenectomy, splenic flexure mobilization, cholecystectomy, intraoperative ultrasound - Wound Class: II-Clean Contaminated   - Wound Class: I-Clean    Diagnosis:Pancreas Cancer   Diagnosis Additional Information: No value filed.    Anesthesia Type:  No value filed.    Note:  Anesthesia Post Evaluation    Patient location during evaluation: PACU  Patient participation: Able to fully participate in evaluation  Level of consciousness: awake and alert  Pain management: adequate  Airway patency: patent  Cardiovascular status: acceptable and hemodynamically stable  Respiratory status: acceptable  Hydration status: acceptable  PONV: none     Anesthetic complications: None          Last vitals:  Vitals:    04/17/18 1920 04/17/18 1935 04/17/18 1950   BP: 105/58 113/62 95/53   Pulse: 82 79 73   Resp: 14 13 16   Temp:      SpO2: 95% 95% 94%         Electronically Signed By: Mao Parker MD  April 17, 2018  8:32 PM

## 2018-04-18 NOTE — PLAN OF CARE
Problem: Patient Care Overview  Goal: Plan of Care/Patient Progress Review  OT 7C: OT defer. Per discussion with PT, pt with no acute OT needs and requires skilled therapy services from only 1 discipline to promote return to baseline functioning and activity tolerance. Please see PT care plan note for discharge recommendations.

## 2018-04-18 NOTE — PLAN OF CARE
Problem: Patient Care Overview  Goal: Plan of Care/Patient Progress Review  Outcome: Therapy, progress towards functional goals is fair  POD0 of a lap diagnostic open distal pancreatectomy, splenectomy, cholecystectomy and splenic flexure mobilization with intraop US. A&Ox4, VSS, pressures are soft 90s/50s. Capno WNL, on 1 LPM NC, NG to LIS, brown output. Hudson has good UO, LSC, IS at 1250, BS faint, no gas or BM. Midline L shaped incision is MELODY and CDI. Pain is controlled with a PCA of dilaudid at 0.4 Q10 min. Dangled at bedside, stood, tolerated well. DANIA has SS output. NPO with ice and sips. Port is infusing IVM at 125/hr with PCA. Continue with Post op care.

## 2018-04-18 NOTE — PROGRESS NOTES
"SURGERY CROSS COVER  April 18, 2018    Paged to bedside for SBP into the 80s/40s after a 500 mL for borderline low UOP (~30mL/hr). Patient is asymptomatic and feels well. Her pain is well controlled. She has no chest pain, shortness of breath, palpitations, light headedness, loss of consciousness. She says that she has low blood pressures at baseline.    BP (!) 80/46 (BP Location: Right arm)  Pulse 74  Temp 97.2  F (36.2  C) (Oral)  Resp 14  Ht 1.651 m (5' 5\")  Wt 72.5 kg (159 lb 13.3 oz)  SpO2 97%  BMI 26.6 kg/m2    She appears well  Nlb  Abd incision is c/d/i. Abdomen is soft, minimally tender throughout, not distended. The maxime has serosang output    UOP has been 75 mL/hr over the last 2 hours  MAXIME has put out 55 since OR  NG put out 160    A/P: POD 1 distal pancreatectomy for pancreatic cancer, now with soft pressures after a fluid bolus. She is asymptomatic, has low pressures at baseline, and there are no findings on physical exam to suggest bleeding. Her UOP is good w/ normal HR so I do not think she needs more fluid. Similarly extremely low concern for bleeding.    - Will get the morning labs now, and evaluate from there    I discussed this with the justyn resident on call, Dr. Isela Emery.    --  Tao Cisneros MD/PHD  Gen Surg PGY1    Addendum: Hgb 9.6->8.9->8.0. Pressures have rebounded slightly to high 80s/40s. Will continue to monitor for now. Also discussed with chief resident, Dr. Robyn Franks.    "

## 2018-04-18 NOTE — PROGRESS NOTES
"CLINICAL NUTRITION SERVICES - ASSESSMENT NOTE     Nutrition Prescription    RECOMMENDATIONS FOR MDs/PROVIDERS TO ORDER:  -Monitor BS and need for diabetes education including need for diabetes educator consult.   -Diet advancement per team discretion. Pending BS pt may benefit from consistent carbohydrate diet.   -Restart enzymes (Creon 24) when pt's diet advanced to full liquids or greater.    Malnutrition Status:    -Pt does not meet criteria    Recommendations already ordered by Registered Dietitian (RD):  -None at this time    Future/Additional Recommendations:  -Pending PO intakes w/ diet advancement may consider boost glucose control supplement.     REASON FOR ASSESSMENT  Kitty Bangura is a/an 57 year old female assessed by the dietitian for Admission Nutrition Risk Screen for unintentional loss of 10# or more in the past two months    NUTRITION HISTORY  Pt reports good intake PTA. She has a history of pancreatitis and later found to have pancreatic cancer. For some time pt was not eating well d/t inability to tolerate many foods. She was following a very low fat diet (3g of fat/meal) and also started taking enzymes. Overtime she has been able to increase her fat to ~6g/meal and notes she sometimes goes over this. She has gained some of the weight back that she lost however notes she did end up losing that weight again but overall weights are stable.     Pt notes she was told she had prediabetes and used to be on metformin but with diet changes she was able to manage her BS w/o medications.    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     CURRENT NUTRITION ORDERS  Diet: NPO    LABS  Phos-4.9  BS: -over the last 24 hours    MEDICATIONS  Novolog    ANTHROPOMETRICS  Height: 165.1 cm (5' 5\")  Most Recent Weight: 76.4 kg (168 lb 6.4 oz)    Admit weight: 72.5kg  IBW: 56.8 kg  BMI: Overweight BMI " 25-29.9  Weight History:   Wt Readings from Last 20 Encounters:   04/18/18 76.4 kg (168 lb 6.4 oz)   04/02/18 73.1 kg (161 lb 2.5 oz)   04/02/18 72.4 kg (159 lb 11.2 oz)   03/19/18 70.9 kg (156 lb 3.2 oz)   03/06/18 71.6 kg (157 lb 12.8 oz)   03/01/18 70.9 kg (156 lb 6.4 oz)   02/20/18 73.2 kg (161 lb 6.4 oz)   02/14/18 72.3 kg (159 lb 6.4 oz)   02/14/18 72.3 kg (159 lb 6.4 oz)   02/11/18 72.6 kg (160 lb)   02/05/18 72.9 kg (160 lb 11.2 oz)   01/31/18 73.2 kg (161 lb 4.8 oz)   01/26/18 76.2 kg (168 lb)   01/15/18 76.6 kg (168 lb 14.4 oz)   01/12/18 76.2 kg (168 lb)   01/08/18 76.5 kg (168 lb 11.2 oz)   01/08/18 76.5 kg (168 lb 11.2 oz)   12/12/17 81.7 kg (180 lb 3.2 oz)   12/04/17 78.3 kg (172 lb 9.9 oz)   11/29/17 80.1 kg (176 lb 9.4 oz)   Weights stable over last 3 months, ~5% weight loss in <4 months, ~9% weight loss in <5 months.     Dosing Weight: 61kg    ASSESSED NUTRITION NEEDS  Estimated Energy Needs: 3045-3170 kcals/day (25 - 30 kcals/kg)  Justification: Maintenance  Estimated Protein Needs: 73-92 grams protein/day (1.2 - 1.5 grams of pro/kg)  Justification: Hypercatabolism with critical illness/postop  Estimated Fluid Needs: 1 mL/kcal  Justification: Maintenance    PHYSICAL FINDINGS  See malnutrition section below.    MALNUTRITION  % Intake: Decreased intake does not meet criteria  % Weight Loss: ~9% weight loss in <5 months.   Subcutaneous Fat Loss: None observed  Muscle Loss: None observed  Fluid Accumulation/Edema: None noted  Malnutrition Diagnosis: Patient does not meet two of the above criteria necessary for diagnosing malnutrition but is at risk for malnutrition    NUTRITION DIAGNOSIS  Predicted inadequate nutrient intake related to recent surgery with potential slow diet advancement and/or decreased appetite/intake post-op     INTERVENTIONS  Implementation  Nutrition Education: Provided education on discussed BS and foods with carbohydrates (pt familiar with this).  Discussed that down the road pt  can continue to increase fat in diet. Provided handouts: fat in hospital menu and carbohydrate counting    Goals  Diet adv in next 3-4 days     Monitoring/Evaluation  Progress toward goals will be monitored and evaluated per protocol.    Shakila Amezquita RD, LD  Pager: [phone]

## 2018-04-18 NOTE — PROGRESS NOTES
Surgery Progress Note  DOS: 4/18/2018    Subjective: No acute issues overnight. Pain well controlled. Asymptomatic hypotension. She states her baseline at home is  systolic. Made adequate uop.     Objective:    Temp:  [97.2  F (36.2  C)-99  F (37.2  C)] 97.2  F (36.2  C)  Pulse:  [62-82] 74  Heart Rate:  [63-87] 65  Resp:  [10-17] 12  BP: ()/(41-82) 88/41  MAP:  [62 mmHg-75 mmHg] 68 mmHg  Arterial Line BP: ()/(44-56) 92/49  SpO2:  [92 %-100 %] 95 %    Resp: 12    I/O last 3 completed shifts:  In: 5481.25 [I.V.:4481.25; IV Piggyback:1000]  Out: 1455 [Urine:655; Emesis/NG output:235; Drains:65; Blood:500]    PE:  General: NAD, alert  Neck: Supple, no tracheal deviation  Cardiovascular: RRR  Pumonary: Non labored breathing on RA  Abdomen: Soft, non distended, non tender. Incisions c/d/i. Mild non blanching erythema c/w bruising. DANIA s/s.   : demarco in place with kendra urine  Ext: W/o edema, wwp  Neuro: No gross focal deficit     Labs reviewed    Na 146  Cr stable at 0.71  Glucose 150-178, 3u insulin given for sliding scale  Wbc downtrending 15  hgb downtrending 8  Amylase 27  A/P   57 year old with necrotizing pancreatitis s/p endoscopic gastrocystostomy, pancreatic adenocarcinoma at the body, POD#1 open distal pancreatectomy/splenectomy, cholecystectomy.    Neuro: tylenol scheduled. PCA dilaudid 0.2-0.3 q 10 min  Resp: Continue pulmonary toilet. Excellent IS this am  CV: Asymptomatic hypotension. Trend today, albumin level.    GI/FEN: NPO, NGT to LIS. LR@100 cc/hr.   Renal: Stable. Continue to monitor uop. Repeat labs in am  Endo: No hx of DM. Glucose stable with SSI, continue to monitor  ID: completed magdaleno op ertapenem x2 doses   Heme: Hgb downtrending, suspect dilutional anemia related to surgery. Hold lovenox this am. Continue to trend  Prophylaxis: GI prophylaxis with protonix 40 mg IV qd  Dispo/dc goals:   -Adequate pain management on oral meds: no  -Stable airway/vss x24 hr: yes  -Ambulating  safely/therapy eval complete: no  -ADL needs addressed: no  -Drain/lines removed or plan in place to manage: no  -Expected discharge date: 3-7 days  Jey Hunter MD  Surgery, PGY-5  [phone]    Addenda  Discussed with Dr. Byrd  Remove ngt, sips ok  Keep demarco  Albumin 25% bolus x1  Await bowel fxn

## 2018-04-18 NOTE — PROGRESS NOTES
"POST OP CHECK  04/17/2018    Kitty Bangura is a 57 year old female with pancreatic cancer now POD #0 s/p diagnostic lap, open distal pancreatectomy & splenectomy, splenic flexure mobilization, cholecystectomy.    Pt reports doing well, pain under control; feels tired.    BP 95/82 (BP Location: Right arm)  Temp 99  F (37.2  C) (Oral)  Resp 11  Ht 1.651 m (5' 5\")  Wt 72.5 kg (159 lb 13.3 oz)  SpO2 95%  BMI 26.6 kg/m2  General: Alert, interactive, NAD; NGT in place  Chest: NLB  Abdomen: Soft, mildly distended, appropriately tender; DANIA in place w/ bloody OP  Incision/Dressing: C/d/i  Extremities: WWP    I/O this shift:  In: 1020 [I.V.:20; IV Piggyback:1000]  Out: 225 [Urine:35; Emesis/NG output:150; Drains:40]      A/P: Kitty Bangura is a 57 year old female now POD #0 s/p procedure listed above. Doing well postoperatively.  - Encourage use of benzocaine spray   - Continue cares per primary    Xin Sheth MD/MPH  Plastic Surgery PGY1  "

## 2018-04-18 NOTE — PROGRESS NOTES
Patient evaluated this PM. Sepsis protocol triggered due to leukocytosis and hypotension.  Patient is asymptomatic, denies sob, chest pain, light headedness, worsening abd pain. uop is appropriate.   Abd benign, drain s/s < 50 cc today, normal HR. Amylase wnl in serum and peritoneal drain  Low suspicion for sepsis. SIRS after pancreatectomy. Fluid shifts after surgery contributing to dilutional anemia   Will transfuse 1u RBC this evening and check for response. Continue serial exams.     Jey Hunter MD  Surgery PGY-5  [phone]

## 2018-04-18 NOTE — PROGRESS NOTES
04/18/18 0840   Quick Adds   Type of Visit Initial PT Evaluation   Living Environment   Lives With spouse;child(stan), adult   Living Arrangements house   Home Accessibility stairs within home   Number of Stairs to Enter Home 1   Number of Stairs Within Home (Pt notes full flight to upstairs/basement, no need to use)   Stair Railings at Home (HR inside, none outside)   Transportation Available car;family or friend will provide   Living Environment Comment Pt lives in home with all needs on main level, 1 step to enter. Pt reports son lives in basement and exercise room located upstairs. No need to access either immediately. Will have family or friends at home 24/7 initially upon disch from hospital.   Self-Care   Usual Activity Tolerance good   Current Activity Tolerance fair   Regular Exercise yes   Activity/Exercise Type strength training;walking;other (see comments)  (yoga, therapy exercises)   Exercise Amount/Frequency daily   Equipment Currently Used at Home none   Activity/Exercise/Self-Care Comment Pt IND with mobility and ADLs at baseline; participated in OP PT prior to surgery for endurance and strength progression. Performing yoga and therapy exercises IND at home (LE/UE strength with light resistance).   Functional Level Prior   Ambulation 0-->independent   Transferring 0-->independent   Toileting 0-->independent   Bathing 0-->independent   Dressing 0-->independent   Eating 0-->independent   Communication 0-->understands/communicates without difficulty   Swallowing 0-->swallows foods/liquids without difficulty   Cognition 0 - no cognition issues reported   Fall history within last six months no   Which of the above functional risks had a recent onset or change? ambulation;transferring   Prior Functional Level Comment Pt IND with all mobility and ADLs, note declined endurance overall but had progressed with therapy over the past month.   General Information   Onset of Illness/Injury or Date of Surgery -  "Date 04/17/18   Referring Physician Jey Hunter MD   Patient/Family Goals Statement Regain IND, get home   Pertinent History of Current Problem (include personal factors and/or comorbidities that impact the POC) Per chart review, pt is a \"57 year old with necrotizing pancreatitis s/p endoscopic gastrocystostomy, pancreatic adenocarcinoma at the body, POD#1 open distal pancreatectomy/splenectomy, cholecystectomy\"   Precautions/Limitations abdominal precautions   Weight-Bearing Status - LUE (< 10#)   Weight-Bearing Status - RUE (< 10#)   Heart Disease Risk Factors Stress   General Observations IV, Capno, demarco catheter   General Info Comments Activity orders: Up with assist   Cognitive Status Examination   Orientation orientation to person, place and time   Level of Consciousness alert   Follows Commands and Answers Questions 100% of the time   Personal Safety and Judgment intact   Memory intact   Pain Assessment   Patient Currently in Pain Yes, see Vital Sign flowsheet  (R shoulder and abdomen)   Integumentary/Edema   Integumentary/Edema no deficits were identifed   Posture    Posture Forward head position;Protracted shoulders   Range of Motion (ROM)   ROM Comment WFL per functional mobility assessment   Strength   Strength Comments Functional though generally deconditioned at BLE. Strength is minimally 3/5 with full anti-gravity movement, demos light tremor at quads for terminal knee extension. Requires UE support in standing.   Bed Mobility   Bed Mobility Comments min A to elevate trunk from R sidelying>sitting EOB   Transfer Skills   Transfer Comments STS with CGA and UE supported   Gait   Gait Comments Pt amb along bedside with BUE supported at FWW and CGA   Balance   Balance Comments Pt stands statically with 1 UE support at therapist, amb with BUE support at walker   Sensory Examination   Sensory Perception no deficits were identified   Modality Interventions   Planned Modality Interventions Cryotherapy " "  General Therapy Interventions   Planned Therapy Interventions ADL retraining;balance training;gait training;neuromuscular re-education;strengthening;transfer training;home program guidelines;risk factor education;progressive activity/exercise   Clinical Impression   Criteria for Skilled Therapeutic Intervention yes, treatment indicated   PT Diagnosis Impaired functional mobility   Influenced by the following impairments decreased strength, stability, endurance, precautions, pain   Functional limitations due to impairments flat bed mobility, transfers, amb, stairs   Clinical Presentation Stable/Uncomplicated   Clinical Presentation Rationale Pt progressing as anticipated post-operatively, with min A-CGA and DME. Pt has good support at home, demonstrates good safety awareness and receptive to activity recommendations between therapy sessions.   Clinical Decision Making (Complexity) Low complexity   Therapy Frequency` other (see comments)  (6x/wk)   Predicted Duration of Therapy Intervention (days/wks) 1 week   Anticipated Discharge Disposition Home with Assist   Risk & Benefits of therapy have been explained Yes   Patient, Family & other staff in agreement with plan of care Yes   [facility] TM \"6 Clicks\"   2016, Trustees of [facility], under license to [employer].  All rights reserved.   6 Clicks Short Forms Basic Mobility Inpatient Short Form   [facility] AM-PAC  \"6 Clicks\" V.2 Basic Mobility Inpatient Short Form   1. Turning from your back to your side while in a flat bed without using bedrails? 4 - None   2. Moving from lying on your back to sitting on the side of a flat bed without using bedrails? 3 - A Little   3. Moving to and from a bed to a chair (including a wheelchair)? 3 - A Little   4. Standing up from a chair using your arms (e.g., wheelchair, or bedside chair)? 3 - A Little   5. To walk in hospital room? 3 - A Little   6. Climbing 3-5 steps with a railing? 3 - A Little "   Basic Mobility Raw Score (Score out of 24.Lower scores equate to lower levels of function) 19   Total Evaluation Time   Total Evaluation Time (Minutes) 7

## 2018-04-18 NOTE — PLAN OF CARE
Problem: Patient Care Overview  Goal: Plan of Care/Patient Progress Review  A&Ox4. Pt hypotensive SBP 80s-100s, AOVSS. Pt c/o incisional pain, well controlled with scheduled tylenol and 0.3 mg PCA dilaudid [id number]. Pt denies nausea, tolerating ice chips and sips of water. Pt ambulated 2x in hallway, once with therapy. Assist of 1 with ambulation and transfers. This morning pt was dizzy, IV albumin given, BP improved, now pt only dizzy upon standing from a lying position. Incision c/d/i, is L-shaped. DANIA with serosanguinous output, to full bulb suction. Demarco with adequate output. Per MD: demarco to remain in place today despite orders to DC on POD 1.  at bedside, attentive to pt's needs. Port infusing LR 100cc/hr. Continue POC.

## 2018-04-19 ENCOUNTER — APPOINTMENT (OUTPATIENT)
Dept: PHYSICAL THERAPY | Facility: CLINIC | Age: 58
DRG: 405 | End: 2018-04-19
Attending: SURGERY
Payer: COMMERCIAL

## 2018-04-19 LAB
ABO + RH BLD: NORMAL
ABO + RH BLD: NORMAL
ANION GAP SERPL CALCULATED.3IONS-SCNC: 7 MMOL/L (ref 3–14)
BLD GP AB SCN SERPL QL: NORMAL
BLD PROD TYP BPU: NORMAL
BLD PROD TYP BPU: NORMAL
BLD UNIT ID BPU: 0
BLOOD BANK CMNT PATIENT-IMP: NORMAL
BLOOD PRODUCT CODE: NORMAL
BPU ID: NORMAL
BUN SERPL-MCNC: 10 MG/DL (ref 7–30)
CALCIUM SERPL-MCNC: 8 MG/DL (ref 8.5–10.1)
CHLORIDE SERPL-SCNC: 109 MMOL/L (ref 94–109)
CO2 SERPL-SCNC: 27 MMOL/L (ref 20–32)
CORTIS SERPL-MCNC: 4 UG/DL (ref 4–22)
CREAT SERPL-MCNC: 0.63 MG/DL (ref 0.52–1.04)
ERYTHROCYTE [DISTWIDTH] IN BLOOD BY AUTOMATED COUNT: 17 % (ref 10–15)
GFR SERPL CREATININE-BSD FRML MDRD: >90 ML/MIN/1.7M2
GLUCOSE BLDC GLUCOMTR-MCNC: 100 MG/DL (ref 70–99)
GLUCOSE BLDC GLUCOMTR-MCNC: 104 MG/DL (ref 70–99)
GLUCOSE BLDC GLUCOMTR-MCNC: 72 MG/DL (ref 70–99)
GLUCOSE BLDC GLUCOMTR-MCNC: 85 MG/DL (ref 70–99)
GLUCOSE BLDC GLUCOMTR-MCNC: 94 MG/DL (ref 70–99)
GLUCOSE BLDC GLUCOMTR-MCNC: 96 MG/DL (ref 70–99)
GLUCOSE BLDC GLUCOMTR-MCNC: 98 MG/DL (ref 70–99)
GLUCOSE SERPL-MCNC: 88 MG/DL (ref 70–99)
HCT VFR BLD AUTO: 26.4 % (ref 35–47)
HGB BLD-MCNC: 8.2 G/DL (ref 11.7–15.7)
LACTATE BLD-SCNC: 0.6 MMOL/L (ref 0.4–1.9)
MAGNESIUM SERPL-MCNC: 1.9 MG/DL (ref 1.6–2.3)
MCH RBC QN AUTO: 31.1 PG (ref 26.5–33)
MCHC RBC AUTO-ENTMCNC: 31.1 G/DL (ref 31.5–36.5)
MCV RBC AUTO: 100 FL (ref 78–100)
NUM BPU REQUESTED: 3
PHOSPHATE SERPL-MCNC: 2.5 MG/DL (ref 2.5–4.5)
PLATELET # BLD AUTO: 151 10E9/L (ref 150–450)
POTASSIUM SERPL-SCNC: 3.6 MMOL/L (ref 3.4–5.3)
RBC # BLD AUTO: 2.64 10E12/L (ref 3.8–5.2)
SODIUM SERPL-SCNC: 142 MMOL/L (ref 133–144)
SPECIMEN EXP DATE BLD: NORMAL
TRANSFUSION STATUS PATIENT QL: NORMAL
TRANSFUSION STATUS PATIENT QL: NORMAL
WBC # BLD AUTO: 13.1 10E9/L (ref 4–11)

## 2018-04-19 PROCEDURE — 25000128 H RX IP 250 OP 636: Performed by: STUDENT IN AN ORGANIZED HEALTH CARE EDUCATION/TRAINING PROGRAM

## 2018-04-19 PROCEDURE — 80048 BASIC METABOLIC PNL TOTAL CA: CPT | Performed by: STUDENT IN AN ORGANIZED HEALTH CARE EDUCATION/TRAINING PROGRAM

## 2018-04-19 PROCEDURE — 97116 GAIT TRAINING THERAPY: CPT | Mod: GP | Performed by: REHABILITATION PRACTITIONER

## 2018-04-19 PROCEDURE — 84100 ASSAY OF PHOSPHORUS: CPT | Performed by: STUDENT IN AN ORGANIZED HEALTH CARE EDUCATION/TRAINING PROGRAM

## 2018-04-19 PROCEDURE — 83605 ASSAY OF LACTIC ACID: CPT | Performed by: SURGERY

## 2018-04-19 PROCEDURE — 36415 COLL VENOUS BLD VENIPUNCTURE: CPT | Performed by: STUDENT IN AN ORGANIZED HEALTH CARE EDUCATION/TRAINING PROGRAM

## 2018-04-19 PROCEDURE — 25000132 ZZH RX MED GY IP 250 OP 250 PS 637: Performed by: STUDENT IN AN ORGANIZED HEALTH CARE EDUCATION/TRAINING PROGRAM

## 2018-04-19 PROCEDURE — 25000132 ZZH RX MED GY IP 250 OP 250 PS 637: Performed by: PHYSICIAN ASSISTANT

## 2018-04-19 PROCEDURE — 12000008 ZZH R&B INTERMEDIATE UMMC

## 2018-04-19 PROCEDURE — 85027 COMPLETE CBC AUTOMATED: CPT | Performed by: STUDENT IN AN ORGANIZED HEALTH CARE EDUCATION/TRAINING PROGRAM

## 2018-04-19 PROCEDURE — 36592 COLLECT BLOOD FROM PICC: CPT | Performed by: SURGERY

## 2018-04-19 PROCEDURE — 25000128 H RX IP 250 OP 636: Performed by: PHYSICIAN ASSISTANT

## 2018-04-19 PROCEDURE — 83735 ASSAY OF MAGNESIUM: CPT | Performed by: STUDENT IN AN ORGANIZED HEALTH CARE EDUCATION/TRAINING PROGRAM

## 2018-04-19 PROCEDURE — 00000146 ZZHCL STATISTIC GLUCOSE BY METER IP

## 2018-04-19 PROCEDURE — 82533 TOTAL CORTISOL: CPT | Performed by: STUDENT IN AN ORGANIZED HEALTH CARE EDUCATION/TRAINING PROGRAM

## 2018-04-19 PROCEDURE — 25000132 ZZH RX MED GY IP 250 OP 250 PS 637: Performed by: SURGERY

## 2018-04-19 PROCEDURE — 97110 THERAPEUTIC EXERCISES: CPT | Mod: GP | Performed by: REHABILITATION PRACTITIONER

## 2018-04-19 PROCEDURE — 25000128 H RX IP 250 OP 636: Performed by: SURGERY

## 2018-04-19 PROCEDURE — 40000193 ZZH STATISTIC PT WARD VISIT: Performed by: REHABILITATION PRACTITIONER

## 2018-04-19 RX ORDER — ACETAMINOPHEN 500 MG
1000 TABLET ORAL EVERY 8 HOURS
Status: DISCONTINUED | OUTPATIENT
Start: 2018-04-19 | End: 2018-04-22 | Stop reason: HOSPADM

## 2018-04-19 RX ORDER — HEPARIN SODIUM 5000 [USP'U]/.5ML
5000 INJECTION, SOLUTION INTRAVENOUS; SUBCUTANEOUS EVERY 8 HOURS
Status: DISCONTINUED | OUTPATIENT
Start: 2018-04-19 | End: 2018-04-20

## 2018-04-19 RX ADMIN — SODIUM CHLORIDE, POTASSIUM CHLORIDE, SODIUM LACTATE AND CALCIUM CHLORIDE: 600; 310; 30; 20 INJECTION, SOLUTION INTRAVENOUS at 21:54

## 2018-04-19 RX ADMIN — ACETAMINOPHEN 1000 MG: 500 TABLET, FILM COATED ORAL at 18:36

## 2018-04-19 RX ADMIN — HEPARIN SODIUM 5000 UNITS: 5000 INJECTION, SOLUTION INTRAVENOUS; SUBCUTANEOUS at 19:55

## 2018-04-19 RX ADMIN — PANCRELIPASE 48000 UNITS: 24000; 76000; 120000 CAPSULE, DELAYED RELEASE PELLETS ORAL at 21:00

## 2018-04-19 RX ADMIN — ACETAMINOPHEN 1000 MG: 500 TABLET, FILM COATED ORAL at 02:17

## 2018-04-19 RX ADMIN — PANTOPRAZOLE SODIUM 40 MG: 40 TABLET, DELAYED RELEASE ORAL at 08:36

## 2018-04-19 RX ADMIN — HEPARIN SODIUM 5000 UNITS: 5000 INJECTION, SOLUTION INTRAVENOUS; SUBCUTANEOUS at 12:26

## 2018-04-19 RX ADMIN — SODIUM CHLORIDE, POTASSIUM CHLORIDE, SODIUM LACTATE AND CALCIUM CHLORIDE: 600; 310; 30; 20 INJECTION, SOLUTION INTRAVENOUS at 07:36

## 2018-04-19 RX ADMIN — ACETAMINOPHEN 1000 MG: 500 TABLET, FILM COATED ORAL at 10:14

## 2018-04-19 ASSESSMENT — PAIN DESCRIPTION - DESCRIPTORS
DESCRIPTORS: ACHING
DESCRIPTORS: ACHING

## 2018-04-19 NOTE — PROVIDER NOTIFICATION
Pt is hypotensive. Paged WINSTON Cisneros about this and MD's are aware that this is her baseline. Will continue to monitor.

## 2018-04-19 NOTE — PLAN OF CARE
"Problem: Patient Care Overview  Goal: Plan of Care/Patient Progress Review  Outcome: No Change  Hypotensive 80-90s/40-50s. Pain managed with PCA Dilaudid, Tylenol and ice packs for pain. NPO with ice chips. Denies nausea. Abdominal incision dermabonded with erythema. DANIA with serosanguinous output. [place] with good urine output. Up with assist of 1 to the chair. Bowel sounds present, denies passing gas but states that she's feeling \"gas pressure.\" BG checks q4h, SSI not needed. Continue with plan of care - monitor hypotension, encourage activity as tolerated.       "

## 2018-04-19 NOTE — DISCHARGE SUMMARY
[facility] Discharge Summary    Kitty Bangura MRN# [id number]   Age: 57 year old YOB: 1960     Date of Admission:  4/17/2018  Date of Discharge::  4/22/2018 10:28 AM  Admitting Physician:  Ja Byrd MD  Discharge Physician:  Ja Byrd MD     PCP:  Solange Mcgill    Disposition: Patient discharged to home in stable condition.    Admission Diagnosis:  Pancreatic adenocarcinoma   Necrotizing pancreatitis s/p endoscopic gastrocystomy     Past Medical History:   Diagnosis Date     Necrotizing pancreatitis      Pancreatic cancer (H)      PONV (postoperative nausea and vomiting)      Discharge Diagnosis:  Pancreatic adenocarcinoma   Necrotizing pancreatitis s/p endoscopic gastrocystomy   Hypotension  Anemia - dilutional acute blood loss     Patient Active Problem List   Diagnosis     Acute pancreatitis     Leukocytosis     Fatty liver     Pancreatic mass     Pancreatitis     Necrotizing pancreatitis     Pancreatic adenocarcinoma (H)     Dehydration     Pancreas cancer (H)     Pancreatic cancer (H)     Hypernatremia     Hypotension     Anemia     Thrombocytopenia (H)     Hyperglycemia     Discharge medications  Current Discharge Medication List      START taking these medications    Details   acetaminophen (TYLENOL) 500 MG tablet Take 2 tablets (1,000 mg) by mouth every 8 hours  Qty: 100 tablet, Refills: 1    Associated Diagnoses: Acute post-operative pain      enoxaparin (LOVENOX) 40 MG/0.4ML injection Inject 0.4 mLs (40 mg) Subcutaneous daily for 23 days  Qty: 9.2 mL, Refills: 0    Associated Diagnoses: Pancreatic adenocarcinoma (H)      pantoprazole (PROTONIX) 40 MG EC tablet Take 1 tablet (40 mg) by mouth every morning  Qty: 30 tablet, Refills: 0    Associated Diagnoses: Pancreatic adenocarcinoma (H)      polyethylene glycol (MIRALAX/GLYCOLAX) Packet Take 17 g by mouth daily  Qty: 30 packet, Refills: 0    Associated Diagnoses: Drug-induced constipation          CONTINUE these medications which have CHANGED    Details   oxyCODONE IR (ROXICODONE) 5 MG tablet Take 1-2 tablets (5-10 mg) by mouth every 3 hours as needed for moderate to severe pain  Qty: 30 tablet, Refills: 0    Associated Diagnoses: Acute post-operative pain         CONTINUE these medications which have NOT CHANGED    Details   amylase-lipase-protease (CREON) 36875-16923 UNITS CPEP per EC capsule Take 2-3 with meals / 1-2 with snacks, up to 15 per day.  Qty: 450 capsule, Refills: 6    Associated Diagnoses: Necrotizing pancreatitis      albuterol (PROAIR HFA/PROVENTIL HFA/VENTOLIN HFA) 108 (90 BASE) MCG/ACT Inhaler Inhale 2 puffs into the lungs every 6 hours as needed for shortness of breath / dyspnea or wheezing  Qty: 1 Inhaler, Refills: 0    Associated Diagnoses: Acute bronchitis, unspecified organism      LORazepam (ATIVAN) 0.5 MG tablet Take 1 tablet (0.5 mg) by mouth every 4 hours as needed (Anxiety, Nausea/Vomiting or Sleep)  Qty: 30 tablet, Refills: 2    Associated Diagnoses: Pancreatic adenocarcinoma (H)      ondansetron (ZOFRAN) 8 MG tablet Take 1 tablet (8 mg) by mouth every 8 hours as needed for nausea  Qty: 30 tablet, Refills: 0    Associated Diagnoses: Pancreatic adenocarcinoma (H)      prochlorperazine (COMPAZINE) 10 MG tablet Take 1 tablet (10 mg) by mouth every 6 hours as needed (Nausea/Vomiting)  Qty: 30 tablet, Refills: 2    Associated Diagnoses: Pancreatic adenocarcinoma (H)         STOP taking these medications       dexamethasone (DECADRON) 4 MG tablet Comments:   Reason for Stopping:         HYDROcodone-acetaminophen (NORCO) 5-325 MG per tablet Comments:   Reason for Stopping:         HYDROmorphone (DILAUDID) 2 MG tablet Comments:   Reason for Stopping:             Follow up, Special Instructions:  After Care     Future Labs/Procedures    Activity     Comments:    Your activity upon discharge: No lifting more than 10-15 lbs for 6 weeks. Recommend frequent ambulation.    Diet      "Comments:    Follow this diet upon discharge: Regular diet    Discharge Instructions     Comments:    If your travel plans upon discharge include prolonged periods of sitting (a lengthy car or plane ride), it is highly beneficial to get up and walk at least once per hour to help prevent swelling and blood clots.     You may shower after operation, let warm soapy water run over incision and pat dry. Do not submerge, soak, or scrub incision.    Take incentive spirometer home for continued frequent use    You will be discharged with narcotic pain medications. Please take them only as needed and do not operate a car or heavy machinery for 24 hours after taking them.  Narcotic pain medications like oxycodone are constipating. Please ensure that you are drinking adequate amounts of fluids and taking stool softeners while you are taking narcotics. Take Miralax as needed for constipation.     Do not drive until you can with stand pressing the brake pedal quickly and fully without pain and you are not distracted by pain.     Call for fever greater than 101.5, chills, red skin around incision, drainage from incision, increased swelling from the incision, bleeding from the incision that is not controlled with light pressure, inability to tolerate diet,new nausea/vomiting or other new/worsening symptoms.   You may also call the surgical oncology nurse care coordinator from 8:00am-4:30pm YELENA Trujillo at [phone]. For after hours questions or concerns you can contact the on-call surgical oncology resident (nights and weekends [phone] and ask \"I would like to page the Surgical Oncology Resident on call.\"). In emergencies, call [phone]    Follow Up:  Follow up in clinic with Dr. Byrd in 1 week. Follow up with your primary care provider within 1 week for glucose monitoring. You should be called to make an appointment within 3 business days. If you are not contacted, call [phone] to make an appointment.    "     Procedures:  4/17/18 (Dr. Byrd) Diagnostic laparoscopy, splenic flexure mobilization, intraoperative ultrasound, distal pancreatectomy, splenectomy, and cholecystectomy    Consultations:  OCCUPATIONAL THERAPY ADULT IP CONSULT  PHYSICAL THERAPY ADULT IP CONSULT  VASCULAR ACCESS CARE ADULT IP CONSULT    Brief HPI:  Kitty Bangura is a 57-year-old woman with history of necrotizing pancreatitis s/p multiple endoscopic debridements and drainage procedures to control her necrotizing pancreatitis.  On her initial EUS she was noted to have a cystic mass within the tail of her pancreas that also had solid components.  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  She was started on neoadjuvant chemotherapy. She received 4 cycles of FOLFIRINOX had no evidence of tumor progression on repeat imaging, and her peripancreatic fluid collection seemed to be controlled with a transgastric drain the pancreatic duct stent. She was offered a distal pancreatectomy and splenectomy for her pancreas cancer and after thoroughly discussing all the potential risks she agreed to proceed with surgery.    Hospital Course:  The patient was admitted and underwent the above procedure. The patient tolerated the procedure well. Her post operative course was notable for asymptomatic hypotension and dilutional acute blood loss anemia.     #S/p splenectomy, cholecystectomy, and distal pancreatectomy: Her diet was advanced as bowel function returned. DANIA drain amylase was 26 on POD 1 and 14 on post operative day 3.  The DANIA drain was removed prior to discharge.   - Tolerating Regular diet  - Follow up in 1 week with Dr. Byrd.     #Aymptomatic hypotension: She has low blood pressure at baseline. Post operatively her systolic blood pressure ranged from 80's-110's. She received fluid resuscitation. Vitals were monitored and remained stable.     #Anemia dilutional and acute blood loss: Post operatively her hemoglobin  down trended to 7.0 from 11.1 preoperatively. Lovenox was held. She was transfused 1 unit PRBC. Her hemoglobin was monitored and remained stable. She did not require a blood transfusion. Heparin was restarted for DVT prophylaxis and she was transitioned to Lovenox.   - Continue Lovenox for a total of 28 days after surgery.     #Hyperglycemia: serum glucose was monitored and covered with sliding scale insulin.  - Follow up with primary care provider within 1 week for glucose recheck and monitoring.     Prior to discharge pain was controlled with oral pain medication and the patient was able to ambulate and void without difficulty. The patient received appropriate education post operatively including Lovenox teaching. On POD #5 the patient was discharged to home.    Surgical pathology  Pending     Sobeida Huiazr PA-C    Completed by   Dain Perales   Surgery Resident PGY-3

## 2018-04-19 NOTE — PLAN OF CARE
Problem: Patient Care Overview  Goal: Plan of Care/Patient Progress Review  PT - per plan established by the Physical Therapist, according to functional mobility the  discharge recommendation is home with assist as needed. Pt is progressing well still limited by pain. Pt needing SBA with V.c for tech wit HOB elevated,pt ed on working on flat bed transfer. Pt demo Sit to stand with SBA. Pt amb up to 100'x 4 with WW. ( PT to progress with SEC. ) pt demo seated there ex program x 10.   Pt ED on tech for car transfers, and ed on furniture at home.   Discharge Planner PT   Patient plan for discharge: home   Current status: see above.   Barriers to return to prior living situation: pain weakness.   Recommendations for discharge: home with assist as needed.   Rationale for recommendations: pt is progressing well, good support at home.        Entered by: Chapo Talbert 04/19/2018 2:18 PM

## 2018-04-19 NOTE — PLAN OF CARE
"Problem: Patient Care Overview  Goal: Plan of Care/Patient Progress Review  Outcome: No Change  BPs continue to be 80s/50s. Incision is clean, dry, intact, with erythema around incision. Bowel sounds are hypoactive, passing gas and states she is feeling \"gas pressure,\" no BM. PCA decreased slightly on day shift. Increasing abdominal pain, warm pack given and walking encouraged. Pt up with 1 assist to chair and ambulating hallway x 3 on day shift. NPO with ice chips and small sips of water. BG checks Q4H, SSI not needed. Pt wondering if Lidocaine patches would be beneficial for pain control. Continue with plan of care.      "

## 2018-04-19 NOTE — PROGRESS NOTES
Surgical Oncology Progress Note    Interval History:  No acute events overnight. Continued hypotension, stable. Hgb stable. Feeling better this morning without dizziness. Denies nausea or vomiting. No flatus or stool yet.     Physical Exam:   Temp:  [96.8  F (36  C)-99.3  F (37.4  C)] 99.3  F (37.4  C)  Pulse:  [65-80] 74  Heart Rate:  [68-80] 80  Resp:  [11-21] 18  BP: ()/(40-56) 86/46  SpO2:  [94 %-99 %] 96 %  General: Alert, oriented, appears comfortable, NAD.  Respiratory: breathing non labored  Abdomen: Abdomen is soft, appropriately tender, non-distended. Incision is clean, dry and intact with mild erythema and bruising.     Data:   All laboratory and imaging data in the past 24 hours reviewed  I/O last 3 completed shifts:  In: 2230.42 [I.V.:2230.42]  Out: 1460 [Urine:1325; Emesis/NG output:50; Drains:85]  Recent Labs   Lab Test  04/18/18   2141  04/18/18   1603  04/18/18   0343  04/17/18   1936  04/17/18   1717   04/02/18   1149  03/19/18   0703   WBC   --    --   15.5*   --   20.0*   --   7.5  22.8*   HGB  7.7*  7.0*  8.0*   --   8.9*   < >  11.1*  11.1*   PLT   --    --   148*  183  164   --   254  112*   INR   --    --    --    --   1.24*   --   0.99  1.06    < > = values in this interval not displayed.      Recent Labs   Lab Test  04/18/18   0343  04/17/18   1936  04/17/18   1717  04/17/18   1413   04/02/18   1149   NA  146*   --   145*  141   < >  141   POTASSIUM  4.7   --   4.7  4.1   < >  3.9   CHLORIDE  116*   --   115*   --    --   110*   CO2  21   --   19*   --    --   25   BUN  16   --   17   --    --   13   CR  0.71  0.71  0.69   --    --   0.52   ANIONGAP  9   --   11   --    --   6   ILIANA  7.7*   --   7.8*   --    --   9.1   GLC  159*   --   202*  167*   < >  95    < > = values in this interval not displayed.     Recent Labs   Lab Test  04/18/18   0343  04/02/18   1149   PROTTOTAL   --   7.2   ALBUMIN  2.4*  3.3*   BILITOTAL   --   0.4   ALKPHOS   --   109   AST   --   30   ALT   --   28      Assessment and Plan:     Kitty Bangura is a 57 year old with necrotizing pancreatitis s/p endoscopic gastrocystostomy, pancreatic adenocarcinoma at the body, POD#1 open distal pancreatectomy/splenectomy, cholecystectomy.    Neuro: tylenol scheduled. Decrease PCA dilaudid 0.1 q 10 min  Resp: Continue pulmonary toilet. Excellent IS this am  CV: Asymptomatic hypotension. Trend today.    GI/FEN: NPO. LR@75 cc/hr.   Renal: Stable. Continue to monitor uop. Repeat labs this am. Continue demarco catheter to monitor I/O.  Endo: No hx of DM. Glucose stable with SSI, continue to monitor  ID: completed magdaleno op ertapenem x2 doses   Heme: Hgb stable, suspect dilutional anemia related to surgery. Hold lovenox. Continue to trend.  Prophylaxis: GI prophylaxis with protonix 40 mg qd  Out of bed and ambulation with assistance  -Expected discharge date: 3-7 days    See with Chief resident who will discuss with Staff.     Sobeida Huizar PAQuiqueC   Surgical Oncology

## 2018-04-19 NOTE — PLAN OF CARE
Problem: Patient Care Overview  Goal: Plan of Care/Patient Progress Review  Outcome: No Change  Assumed care of pt from 0116-4025    POD #1 diagnostic laparoscopy open distal pancreatectomy and splenectomy, splenic flexure mobilization, cholecystectomy and intraop. Ultrasound for pancreatic cancer. Port infusing IVMF and dilaudid PCA 0.3q10. L shaped abd inc with dermabond and MELODY, slight erythema around incision. Rt DANIA to bulb suction, dressing c/d/i. Hudson with adequate urine output. One unit of blood given this evening, hgb is now 7.7. NPO exc meds ice chips. Up with assist of one. No gas or BM yet. Pt also hypotensive per baseline, see MD notifier note, MD's aware and not concerned. Continue to monitor. BG checks q4hr with sliding scale insulin. No coverage given this evening.     Pt would not like to be woken up for bedside report at shift change.

## 2018-04-19 NOTE — PLAN OF CARE
Problem: Patient Care Overview  Goal: Plan of Care/Patient Progress Review  Outcome: Therapy, progress toward functional goals is gradual  Cares assumed at 1500.  H/o necrotizing pancreatitis s/p endoscopic gastrocystostomy, pancreatic adenocarcinoma at the body, POD#1 open distal pancreatectomy/splenectomy, cholecystectomy(per MD note). Currently with low BPs and low hemoglobin at 7.0.  VS: VSS except low BPs in the 70-80/40s-MD aware. 1PRBCs ordered.               GI/: Rounded, Hypo BS, denies flatus/stool. Adeq uop per demarco, demarco cares done.  Diet/appetite: NPO, tolerating ice chips.  Activity: SBA with mobility but at bedrest at the moment d/t low bp and hemoglobin, c/o's dizziness with change in position from bed to sitting. IS/CDB encouraged.  Pain: Controlled with PCA dilaudid and scheduled tylenol tabs and ice pack to abd area.  Skin/drains: L-shaped abd incision with intact derma bond, bruised surrounding area. DANIA with moderate serosang drainage.  Lines: Port infusing.   Blood infusing to port, No adverse reaction noted at this time.   P: monitor BPs/VS, labs/hemoglobin, incision/drains and gen status and continue with POC.

## 2018-04-19 NOTE — PROGRESS NOTES
"24 HOUR EVENTS  Patient hemoglobin fell to 7 and patient was symptomatic yesterday afternoon and she received 1u pRBCs with improvement to 7.7  Patient triggered the sepsis protocol due to leukocytosis and hypotension, patient was asymptomatic and lactate was wnl  NGT was removed without complication    SUBJECTIVE  Patient slept well overnight. Abd pain is increasing but manageable. She has not been able to ambulate overnight, but yesterday she has lightheaded with standing. She has not passed gas. She tolerated sips yesterday without nausea. Denies fevers, chills, SOB.    OBJECTIVE  BP (!) 86/46 (BP Location: Right arm)  Pulse 74  Temp 99.3  F (37.4  C)  Resp 18  Ht 1.651 m (5' 5\")  Wt 76.4 kg (168 lb 6.4 oz)  SpO2 96%  BMI 28.02 kg/m2    UOP: 600cc overnight    PHYSICAL EXAM  GENERAL- pleasant woman resting comfortably in bed  ABD- soft, appropriately tender, mildly distended. L shaped incision with blanching erythema with borders 2 cm away from incision    LABS- am pending    A/P  57F h/o necrotizing pancreatitis s/p gastrocystotomy drain, pancreatic KENDRICK now POD#2 from distal pancreatectomy, splenectomy, cholecystectomy doing well post-op    FEN- Reduce to 40cc/hr LR, sips of clears  NEURO- decrease PCA dose, scheduled PO tylenol  CV- patient continues to by mildly hypotensive with lightheadedness on ambulation: continue monitoring  RESP- patient satting well on room air  GI- PPI, s/p NGT. LUQ drain with appropriate amylase and low output: no evidence of leak  - keep demarco to better monitor I/O in context of hypotension of low hbg  HEME- improved w/ 1u, ppx lovenox pending am hbg  ID- blanching erythema at incision- start abx for cellulitis, outline erythema  ENDO- patient BG <150 w/o insulin. Check AM cortisol in context of hypotension and h/o chronic glucocorticoid use, however has not taken for past month  DISPO- likely 4-6 days  "

## 2018-04-20 ENCOUNTER — APPOINTMENT (OUTPATIENT)
Dept: PHYSICAL THERAPY | Facility: CLINIC | Age: 58
DRG: 405 | End: 2018-04-20
Attending: SURGERY
Payer: COMMERCIAL

## 2018-04-20 LAB
AMYLASE FLD-CCNC: 14 U/L
AMYLASE SERPL-CCNC: 21 U/L (ref 30–110)
ANION GAP SERPL CALCULATED.3IONS-SCNC: 7 MMOL/L (ref 3–14)
BUN SERPL-MCNC: 4 MG/DL (ref 7–30)
CALCIUM SERPL-MCNC: 8.1 MG/DL (ref 8.5–10.1)
CHLORIDE SERPL-SCNC: 110 MMOL/L (ref 94–109)
CO2 SERPL-SCNC: 28 MMOL/L (ref 20–32)
CREAT SERPL-MCNC: 0.51 MG/DL (ref 0.52–1.04)
ERYTHROCYTE [DISTWIDTH] IN BLOOD BY AUTOMATED COUNT: 15.8 % (ref 10–15)
GFR SERPL CREATININE-BSD FRML MDRD: >90 ML/MIN/1.7M2
GLUCOSE BLDC GLUCOMTR-MCNC: 113 MG/DL (ref 70–99)
GLUCOSE BLDC GLUCOMTR-MCNC: 133 MG/DL (ref 70–99)
GLUCOSE BLDC GLUCOMTR-MCNC: 158 MG/DL (ref 70–99)
GLUCOSE BLDC GLUCOMTR-MCNC: 78 MG/DL (ref 70–99)
GLUCOSE BLDC GLUCOMTR-MCNC: 89 MG/DL (ref 70–99)
GLUCOSE SERPL-MCNC: 78 MG/DL (ref 70–99)
HCT VFR BLD AUTO: 24.9 % (ref 35–47)
HGB BLD-MCNC: 7.9 G/DL (ref 11.7–15.7)
MAGNESIUM SERPL-MCNC: 1.9 MG/DL (ref 1.6–2.3)
MCH RBC QN AUTO: 31.6 PG (ref 26.5–33)
MCHC RBC AUTO-ENTMCNC: 31.7 G/DL (ref 31.5–36.5)
MCV RBC AUTO: 100 FL (ref 78–100)
PHOSPHATE SERPL-MCNC: 3.8 MG/DL (ref 2.5–4.5)
PLATELET # BLD AUTO: 151 10E9/L (ref 150–450)
POTASSIUM SERPL-SCNC: 3.6 MMOL/L (ref 3.4–5.3)
RBC # BLD AUTO: 2.5 10E12/L (ref 3.8–5.2)
SODIUM SERPL-SCNC: 144 MMOL/L (ref 133–144)
SPECIMEN SOURCE FLD: NORMAL
WBC # BLD AUTO: 13.2 10E9/L (ref 4–11)

## 2018-04-20 PROCEDURE — 12000001 ZZH R&B MED SURG/OB UMMC

## 2018-04-20 PROCEDURE — 84100 ASSAY OF PHOSPHORUS: CPT | Performed by: STUDENT IN AN ORGANIZED HEALTH CARE EDUCATION/TRAINING PROGRAM

## 2018-04-20 PROCEDURE — 80048 BASIC METABOLIC PNL TOTAL CA: CPT | Performed by: STUDENT IN AN ORGANIZED HEALTH CARE EDUCATION/TRAINING PROGRAM

## 2018-04-20 PROCEDURE — 85027 COMPLETE CBC AUTOMATED: CPT | Performed by: STUDENT IN AN ORGANIZED HEALTH CARE EDUCATION/TRAINING PROGRAM

## 2018-04-20 PROCEDURE — 00000146 ZZHCL STATISTIC GLUCOSE BY METER IP

## 2018-04-20 PROCEDURE — 25000132 ZZH RX MED GY IP 250 OP 250 PS 637: Performed by: STUDENT IN AN ORGANIZED HEALTH CARE EDUCATION/TRAINING PROGRAM

## 2018-04-20 PROCEDURE — 40000193 ZZH STATISTIC PT WARD VISIT

## 2018-04-20 PROCEDURE — 83735 ASSAY OF MAGNESIUM: CPT | Performed by: STUDENT IN AN ORGANIZED HEALTH CARE EDUCATION/TRAINING PROGRAM

## 2018-04-20 PROCEDURE — 82150 ASSAY OF AMYLASE: CPT | Performed by: STUDENT IN AN ORGANIZED HEALTH CARE EDUCATION/TRAINING PROGRAM

## 2018-04-20 PROCEDURE — 25000132 ZZH RX MED GY IP 250 OP 250 PS 637: Performed by: PHYSICIAN ASSISTANT

## 2018-04-20 PROCEDURE — 36592 COLLECT BLOOD FROM PICC: CPT | Performed by: STUDENT IN AN ORGANIZED HEALTH CARE EDUCATION/TRAINING PROGRAM

## 2018-04-20 PROCEDURE — 97116 GAIT TRAINING THERAPY: CPT | Mod: GP

## 2018-04-20 PROCEDURE — 97530 THERAPEUTIC ACTIVITIES: CPT | Mod: GP

## 2018-04-20 PROCEDURE — 25000128 H RX IP 250 OP 636: Performed by: STUDENT IN AN ORGANIZED HEALTH CARE EDUCATION/TRAINING PROGRAM

## 2018-04-20 PROCEDURE — 25000132 ZZH RX MED GY IP 250 OP 250 PS 637: Performed by: SURGERY

## 2018-04-20 PROCEDURE — 25000128 H RX IP 250 OP 636: Performed by: PHYSICIAN ASSISTANT

## 2018-04-20 PROCEDURE — 25000128 H RX IP 250 OP 636: Performed by: SURGERY

## 2018-04-20 RX ORDER — HYDROMORPHONE HYDROCHLORIDE 1 MG/ML
.3-.5 INJECTION, SOLUTION INTRAMUSCULAR; INTRAVENOUS; SUBCUTANEOUS
Status: DISCONTINUED | OUTPATIENT
Start: 2018-04-20 | End: 2018-04-22 | Stop reason: HOSPADM

## 2018-04-20 RX ORDER — OXYCODONE HYDROCHLORIDE 5 MG/1
5-10 TABLET ORAL EVERY 4 HOURS PRN
Status: DISCONTINUED | OUTPATIENT
Start: 2018-04-20 | End: 2018-04-21

## 2018-04-20 RX ORDER — HEPARIN SODIUM (PORCINE) LOCK FLUSH IV SOLN 100 UNIT/ML 100 UNIT/ML
5 SOLUTION INTRAVENOUS
Status: DISCONTINUED | OUTPATIENT
Start: 2018-04-20 | End: 2018-04-22 | Stop reason: HOSPADM

## 2018-04-20 RX ORDER — HEPARIN SODIUM,PORCINE 10 UNIT/ML
5-10 VIAL (ML) INTRAVENOUS
Status: DISCONTINUED | OUTPATIENT
Start: 2018-04-20 | End: 2018-04-22 | Stop reason: HOSPADM

## 2018-04-20 RX ORDER — HEPARIN SODIUM,PORCINE 10 UNIT/ML
5-10 VIAL (ML) INTRAVENOUS EVERY 24 HOURS
Status: DISCONTINUED | OUTPATIENT
Start: 2018-04-20 | End: 2018-04-22 | Stop reason: HOSPADM

## 2018-04-20 RX ORDER — LIDOCAINE 40 MG/G
CREAM TOPICAL
Status: DISCONTINUED | OUTPATIENT
Start: 2018-04-20 | End: 2018-04-20

## 2018-04-20 RX ADMIN — OXYCODONE HYDROCHLORIDE 10 MG: 5 TABLET ORAL at 23:11

## 2018-04-20 RX ADMIN — ACETAMINOPHEN 1000 MG: 500 TABLET, FILM COATED ORAL at 03:49

## 2018-04-20 RX ADMIN — PANCRELIPASE 72000 UNITS: 24000; 76000; 120000 CAPSULE, DELAYED RELEASE PELLETS ORAL at 11:00

## 2018-04-20 RX ADMIN — ACETAMINOPHEN 1000 MG: 500 TABLET, FILM COATED ORAL at 21:03

## 2018-04-20 RX ADMIN — OXYCODONE HYDROCHLORIDE 10 MG: 5 TABLET ORAL at 19:08

## 2018-04-20 RX ADMIN — HEPARIN SODIUM 5000 UNITS: 5000 INJECTION, SOLUTION INTRAVENOUS; SUBCUTANEOUS at 03:49

## 2018-04-20 RX ADMIN — ACETAMINOPHEN 1000 MG: 500 TABLET, FILM COATED ORAL at 12:09

## 2018-04-20 RX ADMIN — PANCRELIPASE 72000 UNITS: 24000; 76000; 120000 CAPSULE, DELAYED RELEASE PELLETS ORAL at 18:41

## 2018-04-20 RX ADMIN — ENOXAPARIN SODIUM 40 MG: 40 INJECTION SUBCUTANEOUS at 11:01

## 2018-04-20 RX ADMIN — PANCRELIPASE 48000 UNITS: 24000; 76000; 120000 CAPSULE, DELAYED RELEASE PELLETS ORAL at 08:44

## 2018-04-20 RX ADMIN — PANTOPRAZOLE SODIUM 40 MG: 40 TABLET, DELAYED RELEASE ORAL at 07:57

## 2018-04-20 RX ADMIN — OXYCODONE HYDROCHLORIDE 5 MG: 5 TABLET ORAL at 14:58

## 2018-04-20 RX ADMIN — PANCRELIPASE 24000 UNITS: 24000; 76000; 120000 CAPSULE, DELAYED RELEASE PELLETS ORAL at 16:10

## 2018-04-20 RX ADMIN — OXYCODONE HYDROCHLORIDE 5 MG: 5 TABLET ORAL at 17:16

## 2018-04-20 RX ADMIN — SODIUM CHLORIDE, PRESERVATIVE FREE 5 ML: 5 INJECTION INTRAVENOUS at 21:03

## 2018-04-20 ASSESSMENT — PAIN DESCRIPTION - DESCRIPTORS
DESCRIPTORS: SORE
DESCRIPTORS: SORE
DESCRIPTORS: ACHING
DESCRIPTORS: SORE
DESCRIPTORS: ACHING;SORE
DESCRIPTORS: SORE
DESCRIPTORS: ACHING

## 2018-04-20 NOTE — PLAN OF CARE
Problem: Patient Care Overview  Goal: Plan of Care/Patient Progress Review  Pt demarco catheter removed at 0900 with 250 mL void at 1330. Pt voiding in bathroom. Pt advanced to regular diet, denies nausea and tolerated diet well. Continues to pass flautus, no BM yet. Started on Lovenox, teaching initiated. Pt and family given educational materials. Pt  will be giving injections at home and has not yet demonstrated administration. Pt reports pain control is adequate on Dilaudid PCA at 0.1 mg q10 mins and scheduled Tylenol. Pt up and ambulating around unit x 3 on day shift with standby assist. Continue pain control and encourage ambulation.

## 2018-04-20 NOTE — PLAN OF CARE
Problem: Surgery Nonspecified (Adult)  Goal: Anesthesia/Sedation Recovery  Outcome: Improving  VSS. Pt up ad soni. Ambulated in halls. Voids spont with adequate UOP. Pain controlled with oxycodone X2. Tolerating regular diet. BG WNL. Port saline locked.  at bedside, supportive with cares. Cont. POC.

## 2018-04-20 NOTE — PROGRESS NOTES
Surgical Oncology Progress Note  4/20/2018    No acute events overnight.  Feeling well.  No complaints.  Tolerating clear liquid diet.  Pain controlled.     Temp:  [97.9  F (36.6  C)-99.7  F (37.6  C)] 97.9  F (36.6  C)  Pulse:  [73-88] 78  Heart Rate:  [75-88] 80  Resp:  [16-18] 16  BP: ()/(47-61) 94/56  SpO2:  [94 %-99 %] 95 %    I/O last 3 completed shifts:  In: 1617.08 [P.O.:360; I.V.:1257.08]  Out: 3030 [Urine:2950; Drains:80]    NAD  RRR  Non-labored breathing on RA  Abdomen soft and non-distended  Incision healing well, erythema improved    Hgb: 7.9 today  Drain amylase pending    57 year old woman POD 3 from distal pancreatectomy.  Doing well.     - Transition to oral pain medication later today  - TKO IVF  - Regular diet today, go slowly  - Discontinue demarco  - Drain amylase  - Ambulation    Dain Perales  General Surgery PGY-3  Pager [phone]

## 2018-04-20 NOTE — PLAN OF CARE
Problem: Patient Care Overview  Goal: Plan of Care/Patient Progress Review  Discharge Planner PT   Patient plan for discharge: Home with assist from spouse  Current status: Pt practiced bed mobility with SBA and education to decrease stress on abdomen and progress towards IND. Pt amb 300 ft x2 with FWW>SEC, demonstrating good balance overall but slowed gait mariella.  Barriers to return to prior living situation: medical needs  Recommendations for discharge: Home with assist from spouse  Rationale for recommendations: Pt will be safe to DC home once medically cleared       Entered by: Stephanie Morris 04/20/2018 4:45 PM

## 2018-04-20 NOTE — PLAN OF CARE
Problem: Patient Care Overview  Goal: Plan of Care/Patient Progress Review  Outcome: Therapy, progress toward functional goals as expected  Hypotensive at the start of the night with BP around 80's/50's with maps >60. Cross cover MD Tao Cisneros notified, notifying parameters changed to less than 85 systolic. BP improved as the night went on, BP currently 111/61. Otherwise AVSS. Pt reports abdominal pain, on a Dilaudid PCA at 0.1 mg q 10 mins and scheduled tylenol. Pt reports adequate pain control. Denies nausea, on a clear liquid diet. Passing flatus but has not had BM yet. Hudson with adequate urine volumes. DANIA with serosanguinous output. Abdominal incision with marked but unchanged redness, incision is otherwise c/d/i. Up with SBA. Continue to monitor BP, pain control and GI/ function.

## 2018-04-20 NOTE — PLAN OF CARE
Problem: Patient Care Overview  Goal: Plan of Care/Patient Progress Review  Outcome: Therapy, progress towards functional goals is fair  POD2 pancreatectomy, splenectomy, cholecystectomy. A&Ox4, BP soft, maps >60. Pain is controlled with a PCA at 0.1 of dilaudid. Hudson in place, no orders to remove. Midline L shaped incision is MELODY with marked unchanged erythema. BS+ passing gas, no BM, Clear Liquid diet with Creon. Port is infusing IVM @40/hr. Ambulating in the halls. Plan is to discharge in 3-7 days.    Triggered Lactic acid protocol, unsure why, WBC 13.1 all other afebrile VSS. BP soft as it has been.

## 2018-04-21 VITALS
WEIGHT: 164.8 LBS | HEIGHT: 65 IN | TEMPERATURE: 98.6 F | SYSTOLIC BLOOD PRESSURE: 96 MMHG | HEART RATE: 86 BPM | BODY MASS INDEX: 27.46 KG/M2 | OXYGEN SATURATION: 95 % | RESPIRATION RATE: 16 BRPM | DIASTOLIC BLOOD PRESSURE: 55 MMHG

## 2018-04-21 LAB
ANION GAP SERPL CALCULATED.3IONS-SCNC: 8 MMOL/L (ref 3–14)
BLD PROD TYP BPU: NORMAL
BLD PROD TYP BPU: NORMAL
BLD UNIT ID BPU: 0
BLD UNIT ID BPU: 0
BLOOD PRODUCT CODE: NORMAL
BLOOD PRODUCT CODE: NORMAL
BPU ID: NORMAL
BPU ID: NORMAL
BUN SERPL-MCNC: 5 MG/DL (ref 7–30)
CALCIUM SERPL-MCNC: 8.3 MG/DL (ref 8.5–10.1)
CHLORIDE SERPL-SCNC: 108 MMOL/L (ref 94–109)
CO2 SERPL-SCNC: 28 MMOL/L (ref 20–32)
CREAT SERPL-MCNC: 0.6 MG/DL (ref 0.52–1.04)
ERYTHROCYTE [DISTWIDTH] IN BLOOD BY AUTOMATED COUNT: 15.5 % (ref 10–15)
GFR SERPL CREATININE-BSD FRML MDRD: >90 ML/MIN/1.7M2
GLUCOSE BLDC GLUCOMTR-MCNC: 105 MG/DL (ref 70–99)
GLUCOSE BLDC GLUCOMTR-MCNC: 117 MG/DL (ref 70–99)
GLUCOSE BLDC GLUCOMTR-MCNC: 121 MG/DL (ref 70–99)
GLUCOSE BLDC GLUCOMTR-MCNC: 131 MG/DL (ref 70–99)
GLUCOSE SERPL-MCNC: 107 MG/DL (ref 70–99)
HCT VFR BLD AUTO: 27.1 % (ref 35–47)
HGB BLD-MCNC: 8.5 G/DL (ref 11.7–15.7)
MAGNESIUM SERPL-MCNC: 2.1 MG/DL (ref 1.6–2.3)
MCH RBC QN AUTO: 31.3 PG (ref 26.5–33)
MCHC RBC AUTO-ENTMCNC: 31.4 G/DL (ref 31.5–36.5)
MCV RBC AUTO: 100 FL (ref 78–100)
PHOSPHATE SERPL-MCNC: 3.9 MG/DL (ref 2.5–4.5)
PLATELET # BLD AUTO: 224 10E9/L (ref 150–450)
POTASSIUM SERPL-SCNC: 3.8 MMOL/L (ref 3.4–5.3)
RBC # BLD AUTO: 2.72 10E12/L (ref 3.8–5.2)
SODIUM SERPL-SCNC: 145 MMOL/L (ref 133–144)
TRANSFUSION STATUS PATIENT QL: NORMAL
WBC # BLD AUTO: 10 10E9/L (ref 4–11)

## 2018-04-21 PROCEDURE — 80048 BASIC METABOLIC PNL TOTAL CA: CPT | Performed by: STUDENT IN AN ORGANIZED HEALTH CARE EDUCATION/TRAINING PROGRAM

## 2018-04-21 PROCEDURE — 25000128 H RX IP 250 OP 636: Performed by: STUDENT IN AN ORGANIZED HEALTH CARE EDUCATION/TRAINING PROGRAM

## 2018-04-21 PROCEDURE — 25000132 ZZH RX MED GY IP 250 OP 250 PS 637: Performed by: STUDENT IN AN ORGANIZED HEALTH CARE EDUCATION/TRAINING PROGRAM

## 2018-04-21 PROCEDURE — 25000132 ZZH RX MED GY IP 250 OP 250 PS 637: Performed by: PHYSICIAN ASSISTANT

## 2018-04-21 PROCEDURE — 25000132 ZZH RX MED GY IP 250 OP 250 PS 637: Performed by: SURGERY

## 2018-04-21 PROCEDURE — 25000128 H RX IP 250 OP 636: Performed by: SURGERY

## 2018-04-21 PROCEDURE — 00000146 ZZHCL STATISTIC GLUCOSE BY METER IP

## 2018-04-21 PROCEDURE — 85027 COMPLETE CBC AUTOMATED: CPT | Performed by: STUDENT IN AN ORGANIZED HEALTH CARE EDUCATION/TRAINING PROGRAM

## 2018-04-21 PROCEDURE — 84100 ASSAY OF PHOSPHORUS: CPT | Performed by: STUDENT IN AN ORGANIZED HEALTH CARE EDUCATION/TRAINING PROGRAM

## 2018-04-21 PROCEDURE — 12000001 ZZH R&B MED SURG/OB UMMC

## 2018-04-21 PROCEDURE — 83735 ASSAY OF MAGNESIUM: CPT | Performed by: STUDENT IN AN ORGANIZED HEALTH CARE EDUCATION/TRAINING PROGRAM

## 2018-04-21 PROCEDURE — 36592 COLLECT BLOOD FROM PICC: CPT | Performed by: STUDENT IN AN ORGANIZED HEALTH CARE EDUCATION/TRAINING PROGRAM

## 2018-04-21 RX ORDER — POLYETHYLENE GLYCOL 3350 17 G/17G
17 POWDER, FOR SOLUTION ORAL DAILY
Status: DISCONTINUED | OUTPATIENT
Start: 2018-04-21 | End: 2018-04-22 | Stop reason: HOSPADM

## 2018-04-21 RX ORDER — OXYCODONE HYDROCHLORIDE 5 MG/1
5-10 TABLET ORAL
Status: CANCELLED | OUTPATIENT
Start: 2018-04-21

## 2018-04-21 RX ORDER — OXYCODONE HYDROCHLORIDE 5 MG/1
5 TABLET ORAL
Status: COMPLETED | OUTPATIENT
Start: 2018-04-21 | End: 2018-04-21

## 2018-04-21 RX ORDER — BISACODYL 10 MG
10 SUPPOSITORY, RECTAL RECTAL DAILY PRN
Status: DISCONTINUED | OUTPATIENT
Start: 2018-04-21 | End: 2018-04-22 | Stop reason: HOSPADM

## 2018-04-21 RX ORDER — POLYETHYLENE GLYCOL 3350 17 G/17G
17 POWDER, FOR SOLUTION ORAL DAILY PRN
Status: CANCELLED | OUTPATIENT
Start: 2018-04-21

## 2018-04-21 RX ORDER — OXYCODONE HYDROCHLORIDE 5 MG/1
5-10 TABLET ORAL
Status: DISCONTINUED | OUTPATIENT
Start: 2018-04-21 | End: 2018-04-22 | Stop reason: HOSPADM

## 2018-04-21 RX ORDER — BISACODYL 10 MG
10 SUPPOSITORY, RECTAL RECTAL DAILY PRN
Status: CANCELLED | OUTPATIENT
Start: 2018-04-21

## 2018-04-21 RX ORDER — SENNOSIDES 8.6 MG
8.6 TABLET ORAL 2 TIMES DAILY PRN
Status: CANCELLED | OUTPATIENT
Start: 2018-04-21

## 2018-04-21 RX ADMIN — PANTOPRAZOLE SODIUM 40 MG: 40 TABLET, DELAYED RELEASE ORAL at 08:14

## 2018-04-21 RX ADMIN — OXYCODONE HYDROCHLORIDE 10 MG: 5 TABLET ORAL at 04:14

## 2018-04-21 RX ADMIN — ACETAMINOPHEN 1000 MG: 500 TABLET, FILM COATED ORAL at 04:14

## 2018-04-21 RX ADMIN — OXYCODONE HYDROCHLORIDE 5 MG: 5 TABLET ORAL at 02:13

## 2018-04-21 RX ADMIN — OXYCODONE HYDROCHLORIDE 10 MG: 5 TABLET ORAL at 08:14

## 2018-04-21 RX ADMIN — OXYCODONE HYDROCHLORIDE 10 MG: 5 TABLET ORAL at 17:54

## 2018-04-21 RX ADMIN — OXYCODONE HYDROCHLORIDE 10 MG: 5 TABLET ORAL at 14:43

## 2018-04-21 RX ADMIN — PANCRELIPASE 72000 UNITS: 24000; 76000; 120000 CAPSULE, DELAYED RELEASE PELLETS ORAL at 17:55

## 2018-04-21 RX ADMIN — PANCRELIPASE 24000 UNITS: 24000; 76000; 120000 CAPSULE, DELAYED RELEASE PELLETS ORAL at 00:43

## 2018-04-21 RX ADMIN — ACETAMINOPHEN 1000 MG: 500 TABLET, FILM COATED ORAL at 20:44

## 2018-04-21 RX ADMIN — OXYCODONE HYDROCHLORIDE 10 MG: 5 TABLET ORAL at 20:58

## 2018-04-21 RX ADMIN — POLYETHYLENE GLYCOL 3350 17 G: 17 POWDER, FOR SOLUTION ORAL at 10:11

## 2018-04-21 RX ADMIN — BISACODYL 10 MG: 10 SUPPOSITORY RECTAL at 16:57

## 2018-04-21 RX ADMIN — SODIUM CHLORIDE, PRESERVATIVE FREE 5 ML: 5 INJECTION INTRAVENOUS at 20:43

## 2018-04-21 RX ADMIN — Medication 1 MG: at 23:48

## 2018-04-21 RX ADMIN — PANCRELIPASE 72000 UNITS: 24000; 76000; 120000 CAPSULE, DELAYED RELEASE PELLETS ORAL at 12:31

## 2018-04-21 RX ADMIN — MAGNESIUM HYDROXIDE 30 ML: 400 SUSPENSION ORAL at 16:56

## 2018-04-21 RX ADMIN — ACETAMINOPHEN 1000 MG: 500 TABLET, FILM COATED ORAL at 11:25

## 2018-04-21 RX ADMIN — OXYCODONE HYDROCHLORIDE 10 MG: 5 TABLET ORAL at 11:25

## 2018-04-21 RX ADMIN — ENOXAPARIN SODIUM 40 MG: 40 INJECTION SUBCUTANEOUS at 10:11

## 2018-04-21 RX ADMIN — PANCRELIPASE 72000 UNITS: 24000; 76000; 120000 CAPSULE, DELAYED RELEASE PELLETS ORAL at 08:14

## 2018-04-21 ASSESSMENT — PAIN DESCRIPTION - DESCRIPTORS
DESCRIPTORS: ACHING
DESCRIPTORS: ACHING;SORE
DESCRIPTORS: ACHING;SORE
DESCRIPTORS: ACHING;SORE;CRAMPING
DESCRIPTORS: SHARP
DESCRIPTORS: ACHING;SORE
DESCRIPTORS: ACHING
DESCRIPTORS: SHARP
DESCRIPTORS: SHARP

## 2018-04-21 NOTE — PROGRESS NOTES
Surgical Oncology Progress Note  4/21/2018    No acute events overnight.  Had some bloating, nausea and increasing pain after eating yesterday.  Feels okay this morning but is concerned about these issues.  Passing gas.  No BM yet.  She is ambulating well.      Temp:  [97.8  F (36.6  C)-99.4  F (37.4  C)] 97.8  F (36.6  C)  Pulse:  [70-89] 70  Heart Rate:  [87] 87  Resp:  [16] 16  BP: (92-96)/(49-58) 95/51  SpO2:  [96 %-97 %] 97 %    I/O last 3 completed shifts:  In: 825.5 [P.O.:660; I.V.:165.5]  Out: 3585 [Urine:3550; Drains:35]    Sitting in chair in NAD  RRR  Non-labored breathing on RA  Abdomen soft and non-distended  Incision clean and intact    Hgb: 8.5 this morning    57 year old woman who is POD 4 from distal pancreatectomy.  Doing fine although still not quite tolerating diet.     - Continue regular diet  - Miralax and suppository today  - Increase frequency of oxy to q3 PRN  - Ambulation  - Okay to shower  - Anticipate discharge tomorrow    Dain Perales  General Surgery PGY-3  Pager [phone]

## 2018-04-21 NOTE — PROGRESS NOTES
"SUBJECTIVE  Patient did well overnight. Pain was poorly controlled with switch to oral pain medication and patient is asking for increase in frequency. Walked 6x without lightheadedness. Tolerated regular diet, but experiences some \"tightness\" in abdomen, patient thinks that eating small frequent meals will help. Passing gas, no BM.    OBJECTIVE  BP 95/51 (BP Location: Right arm)  Pulse 70  Temp 97.8  F (36.6  C) (Oral)  Resp 16  Ht 1.651 m (5' 5\")  Wt 75.1 kg (165 lb 8 oz)  SpO2 97%  BMI 27.54 kg/m2    UOP- 650     PHYSICAL EXAM  GENERAL- Pleasant woman resting comfortably in bed  ABD- soft, mildly distended, mildly tender, incision without blanching erythema or drainage    LABS- phos, mag, cbc, bmp pending    A/P  Kitty Bangura is a 57f h/o necrotizing pancreatits s/p cystgastrostomy (1/20) pancreatic cancer POD#5 exploratory laparoscopy, distal pancreatectomy, spelenectomy, and cholecystectomy  who is doing well post op    - adjust oxycodone to Q3  - likely discharge tomorrow    "

## 2018-04-21 NOTE — PLAN OF CARE
Problem: Surgery Nonspecified (Adult)  Goal: Signs and Symptoms of Listed Potential Problems Will be Absent, Minimized or Managed (Surgery Nonspecified)  Signs and symptoms of listed potential problems will be absent, minimized or managed by discharge/transition of care (reference Surgery Nonspecified (Adult) CPG).    Outcome: Improving  Patient reports improved pain management with Oxycodone Q 3 hrs and PO Tylenol. Tolerating regular diet w/o nausea. BG pre-meals WNL. Passing flatus and had a large BM this afternoon after Milk of Mag and a suppository. Voiding with good output. Abd incisions c/d/i. Old DANIA site dressing changed. Abd binder in place. Showered today. Port hep locked. Ambulating in halls with spouse. Likely d/c tomorrow per team.

## 2018-04-21 NOTE — PLAN OF CARE
Problem: Patient Care Overview  Goal: Plan of Care/Patient Progress Review  Outcome: No Change  Assumed care of patient 1930. AVSS on RA. Pain worsening throughout the night, on-call MD paged and one-time oxycodone ordered and given with some relief. Scheduled tylenol and heat packs helpful. Denies nausea, regular diet, creon with meals/snacks. Incision dermabond c/d/i with bruising noted. Port heparin locked. Old DANIA site covered with gauze, c/d/i. Voids frequently in good amounts. Passing minimal gas, no BM. Up with SBA, steady on feet.  Continue POC.

## 2018-04-22 PROCEDURE — 25000132 ZZH RX MED GY IP 250 OP 250 PS 637: Performed by: SURGERY

## 2018-04-22 PROCEDURE — 25000128 H RX IP 250 OP 636: Performed by: STUDENT IN AN ORGANIZED HEALTH CARE EDUCATION/TRAINING PROGRAM

## 2018-04-22 PROCEDURE — 25000128 H RX IP 250 OP 636: Performed by: SURGERY

## 2018-04-22 PROCEDURE — 25000132 ZZH RX MED GY IP 250 OP 250 PS 637: Performed by: PHYSICIAN ASSISTANT

## 2018-04-22 PROCEDURE — 25000132 ZZH RX MED GY IP 250 OP 250 PS 637: Performed by: STUDENT IN AN ORGANIZED HEALTH CARE EDUCATION/TRAINING PROGRAM

## 2018-04-22 RX ORDER — OXYCODONE HYDROCHLORIDE 5 MG/1
5-10 TABLET ORAL
Qty: 30 TABLET | Refills: 0 | Status: SHIPPED | OUTPATIENT
Start: 2018-04-22 | End: 2018-05-14

## 2018-04-22 RX ORDER — ACETAMINOPHEN 500 MG
1000 TABLET ORAL EVERY 8 HOURS
Qty: 100 TABLET | Refills: 1 | Status: SHIPPED | OUTPATIENT
Start: 2018-04-22

## 2018-04-22 RX ORDER — POLYETHYLENE GLYCOL 3350 17 G/17G
17 POWDER, FOR SOLUTION ORAL DAILY
Qty: 30 PACKET | Refills: 0 | Status: ON HOLD | OUTPATIENT
Start: 2018-04-22 | End: 2018-12-11

## 2018-04-22 RX ORDER — PANTOPRAZOLE SODIUM 40 MG/1
40 TABLET, DELAYED RELEASE ORAL EVERY MORNING
Qty: 30 TABLET | Refills: 0 | Status: SHIPPED | OUTPATIENT
Start: 2018-04-22 | End: 2018-06-12

## 2018-04-22 RX ADMIN — OXYCODONE HYDROCHLORIDE 5 MG: 5 TABLET ORAL at 02:30

## 2018-04-22 RX ADMIN — SODIUM CHLORIDE, PRESERVATIVE FREE 5 ML: 5 INJECTION INTRAVENOUS at 09:54

## 2018-04-22 RX ADMIN — OXYCODONE HYDROCHLORIDE 5 MG: 5 TABLET ORAL at 09:46

## 2018-04-22 RX ADMIN — ACETAMINOPHEN 1000 MG: 500 TABLET, FILM COATED ORAL at 05:20

## 2018-04-22 RX ADMIN — PANCRELIPASE 72000 UNITS: 24000; 76000; 120000 CAPSULE, DELAYED RELEASE PELLETS ORAL at 07:41

## 2018-04-22 RX ADMIN — POLYETHYLENE GLYCOL 3350 17 G: 17 POWDER, FOR SOLUTION ORAL at 07:41

## 2018-04-22 RX ADMIN — PANTOPRAZOLE SODIUM 40 MG: 40 TABLET, DELAYED RELEASE ORAL at 07:41

## 2018-04-22 RX ADMIN — OXYCODONE HYDROCHLORIDE 5 MG: 5 TABLET ORAL at 07:41

## 2018-04-22 RX ADMIN — ENOXAPARIN SODIUM 40 MG: 40 INJECTION SUBCUTANEOUS at 09:46

## 2018-04-22 ASSESSMENT — PAIN DESCRIPTION - DESCRIPTORS
DESCRIPTORS: ACHING
DESCRIPTORS: ACHING
DESCRIPTORS: ACHING;SORE
DESCRIPTORS: ACHING;SORE

## 2018-04-22 NOTE — PROVIDER NOTIFICATION
"Paged Xin Sheth at [phone]. Pt having difficulty sleeping and requesting sleep aid. Pt has Ativan on MAR but reports it works \"short-lived\" for her. Pt reports she has previously used Ambien in the hospital.     MD ordered melatonin.   "

## 2018-04-22 NOTE — PLAN OF CARE
Problem: Patient Care Overview  Goal: Plan of Care/Patient Progress Review  Outcome: No Change  Abdominal incision CDI with dermabond. Mild erythema surrounding incision. Hypoactive bowel sounds. Passing flatus, no BM overnight. Denies nausea. Pt reporting gas discomfort and abdominal pain overnight. Pain managed with PO Tylenol and oxycodone. Port heparin locked. On regular diet, BG ac and hs. Voiding spontaneously adequate amounts. Up ad soni. BP soft, OVSS on room air. Possible discharge home today.

## 2018-04-22 NOTE — PLAN OF CARE
Problem: Patient Care Overview  Goal: Plan of Care/Patient Progress Review  Outcome: Adequate for Discharge Date Met: 04/22/18  Discharge  D: Orders for discharge and outpatient medications written.   I: Home medications and return to clinic schedule reviewed with patient and spouse. Spouse able to administer Lovenox independently. Discharge instructions and parameters for calling Health Care Provider reviewed with teach back. Patient left at 10:30 AM accompanied by her spouse.   A: Patient/spouse verbalized understanding and were ready for discharge.   P: Patient instructed to  medications in Pharmacy. Follow up as scheduled.

## 2018-04-22 NOTE — PROGRESS NOTES
"Surgical Oncology Progress Note  4/22/2018    No acute events overnight.  Feels well.  Having bowel function.  Diet going better now.  Wants to go home today.     BP 96/55 (BP Location: Right arm)  Pulse 86  Temp 98.6  F (37  C) (Oral)  Resp 16  Ht 1.651 m (5' 5\")  Wt 74.8 kg (164 lb 12.8 oz)  SpO2 95%  BMI 27.42 kg/m2    I/O last 3 completed shifts:  In: 1450 [P.O.:1440; I.V.:10]  Out: 3080 [Urine:3080]    Sitting in chair in NAD  RRR  Non-labored breathing on RA  Abdomen soft and non-distended  Incision clean and intact    No new labs    57 year old woman POD 5 from distal pancreatectomy and splenectomy.  Doing well.      - Oxycodone and tylenol for pain  - Ambulation  - Small frequent meals for now.  Regular diet  - Discharge later today.     Dain Perlaes  General Surgery PGY-3  Pager [phone]     "

## 2018-04-22 NOTE — PROGRESS NOTES
"SUBJECTIVE  Patient had a large bowel movement last night, but had pain from her anal fissure. Eating improved with small frequent meals. She was able to shower. Overall pain well controlled. She feels ready to be discharged today.    OBJECTIVE  BP 96/55 (BP Location: Right arm)  Pulse 86  Temp 98.6  F (37  C) (Oral)  Resp 16  Ht 1.651 m (5' 5\")  Wt 74.8 kg (164 lb 12.8 oz)  SpO2 95%  BMI 27.42 kg/m2        Intake/Output Summary (Last 24 hours) at 04/22/18 0652  Last data filed at 04/22/18 0523   Gross per 24 hour   Intake              970 ml   Output             3380 ml   Net            -2410 ml       PHYSICAL EXAM  GENERAL- pleasant woman resting comfortably in bed  ABD- soft, mildly distended, mildly tender, incision well approximated without erythema, swelling or drainage    LABS- none ordered. Yesterday labs non concerning and mostly wnl    A/P  Kitty Bangura is a 57f h/o necrotizing pancreatits s/p cystgastrostomy (1/20) pancreatic cancer POD#6 exploratory laparoscopy, distal pancreatectomy, spelenectomy, and cholecystectomy  who is meeting all post-op goals  -Discharge today  "

## 2018-04-23 ENCOUNTER — CARE COORDINATION (OUTPATIENT)
Dept: SURGERY | Facility: CLINIC | Age: 58
End: 2018-04-23

## 2018-04-23 ENCOUNTER — TELEPHONE (OUTPATIENT)
Dept: FAMILY MEDICINE | Facility: CLINIC | Age: 58
End: 2018-04-23

## 2018-04-23 NOTE — PLAN OF CARE
Problem: Patient Care Overview  Goal: Plan of Care/Patient Progress Review  Physical Therapy Discharge Summary    Reason for therapy discharge:    Discharged to home.    Progress towards therapy goal(s). See goals on Care Plan in [facility] electronic health record for goal details.  Goals partially met.  Barriers to achieving goals:   discharge from facility.    Therapy recommendation(s):    No further therapy is recommended.

## 2018-04-23 NOTE — PROGRESS NOTES
RN Care Coordination Post Operative Note  Surgical Oncology    Called Kitty in f/u to recent distal pancreatectomy done by Dr. Byrd    Support: Patient able to care for self independently with her husbands help.    Health Status:    Fevers or Chills:  no    Nausea no  Vomiting no  Diet:  Eating regular small amounts, soft easy to digest food including chicken and salad, grapes, 1/2 turkey sandwich.    Pain: 8 on a scale of one to ten in the morning, lower later  Description: upper left rib area  Medication: Oxycodone one every 4 hours, Tylenol every 8 hours  Relief: yes    Incision: clean dry and intact without edema    Bowels:  Had 4 stools after she had milk of magnesia yesterday, now having totally liquid stools, two today.      Activity/Restrictions:    Discussion:  All of her questions were answered.      Post op visit will be scheduled and patient will be contacted.  Patient has our contact information and I have asked that they call with any further questions or concerns.    Plan: RTC Monday 4/30    Emmanuelle BROOKS, HNBC, STAR-T  RN Care Coordinator  Surgical Oncology  Ph: [phone]  FAX: [phone]

## 2018-04-23 NOTE — TELEPHONE ENCOUNTER
"Pt was admitted on 4/17/18 for the follow surgery:  Laparoscopy; Open Distal Pancreatectomy And Splenectomy, splenic flexure mobilization, cholecystectomy, intraoperative ultrasound  Pt was discharged to home yesterday.    ED / Discharge Outreach Protocol    Patient Contact    Attempt # 1    Was call answered?  Yes.  \"May I please speak with <patient name>\"  Is patient available?   Yes    Hospital/TCU/ED for chronic condition Discharge Protocol    \"Hi, my name is Sherita French, a registered nurse, and I am calling from [facility].  I am calling to follow up and see how things are going for you after your recent emergency visit/hospital/TCU stay.\"    Tell me how you are doing now that you are home?\" Doing as expected s/p surgery as noted above.      Discharge Instructions    \"Let's review your discharge instructions.  What is/are the follow-up recommendations?  Pt. Response: Reviewed in detail.    \"Has an appointment with your primary care provider been scheduled?\"   Pt is waiting for surgeon's office to call her with her follow up post-op appt    \"When you see the provider, I would recommend that you bring your medications with you.\"    Medications    \"Tell me what changed about your medicines when you discharged?\"    Changes to chronic meds?     No  \"What questions do you have about your medications?\"    None at this time.     New diagnoses of heart failure, COPD, diabetes, or MI?    No     Medication reconciliation completed? Yes  Was MTM referral placed (*Make sure to put transitions as reason for referral)?   No    Call Summary    \"What questions or concerns do you have about your recent visit and your follow-up care?\"     none.  Pt will call her surgeon's office if questions.  PCP is a back up if she cannot reach surgeon's office.    \"If you have questions or things don't continue to improve, we encourage you contact us through the main clinic number (give number).  Even if the clinic is not open, triage " "nurses are available 24/7 to help you.     We would like you to know that our clinic has extended hours (provide information).  We also have urgent care (provide details on closest location and hours/contact info)\"      \"Thank you for your time and take care!\"                   "

## 2018-04-25 LAB — COPATH REPORT: NORMAL

## 2018-04-27 ASSESSMENT — ENCOUNTER SYMPTOMS
SLEEP DISTURBANCES DUE TO BREATHING: 0
BLOATING: 0
MUSCLE CRAMPS: 0
JAUNDICE: 0
WEIGHT GAIN: 0
NAUSEA: 0
PALPITATIONS: 0
DECREASED APPETITE: 0
INCREASED ENERGY: 0
SYNCOPE: 0
ORTHOPNEA: 0
FATIGUE: 0
HEARTBURN: 1
BACK PAIN: 0
POLYDIPSIA: 0
DOUBLE VISION: 0
SYNCOPE: 0
JOINT SWELLING: 0
BOWEL INCONTINENCE: 0
ORTHOPNEA: 0
EYE WATERING: 0
NECK PAIN: 0
ABDOMINAL PAIN: 0
FEVER: 0
BACK PAIN: 0
RECTAL PAIN: 0
HYPOTENSION: 1
HALLUCINATIONS: 0
HYPOTENSION: 1
LEG PAIN: 0
INCREASED ENERGY: 0
BLOOD IN STOOL: 0
HEARTBURN: 1
BLOOD IN STOOL: 0
DIARRHEA: 1
MUSCLE CRAMPS: 0
HALLUCINATIONS: 0
EYE IRRITATION: 0
VOMITING: 0
EYE REDNESS: 0
LIGHT-HEADEDNESS: 1
NIGHT SWEATS: 0
WEIGHT LOSS: 0
BLOATING: 0
LIGHT-HEADEDNESS: 1
EYE PAIN: 0
EYE IRRITATION: 0
ALTERED TEMPERATURE REGULATION: 0
MYALGIAS: 0
CONSTIPATION: 1
POLYPHAGIA: 0
NIGHT SWEATS: 0
ABDOMINAL PAIN: 0
DECREASED APPETITE: 0
EXERCISE INTOLERANCE: 0
ALTERED TEMPERATURE REGULATION: 0
BOWEL INCONTINENCE: 0
FATIGUE: 0
EYE WATERING: 0
POLYDIPSIA: 0
WEIGHT GAIN: 0
POLYPHAGIA: 0
HYPERTENSION: 0
CHILLS: 0
MUSCLE WEAKNESS: 0
ARTHRALGIAS: 0
CHILLS: 0
ARTHRALGIAS: 0
MYALGIAS: 0
EXERCISE INTOLERANCE: 0
NAUSEA: 0
NECK PAIN: 0
HYPERTENSION: 0
EYE PAIN: 0
EYE REDNESS: 0
STIFFNESS: 0
VOMITING: 0
RECTAL PAIN: 0
LEG PAIN: 0
MUSCLE WEAKNESS: 0
CONSTIPATION: 1
PALPITATIONS: 0
JAUNDICE: 0
WEIGHT LOSS: 0
FEVER: 0
STIFFNESS: 0
JOINT SWELLING: 0
SLEEP DISTURBANCES DUE TO BREATHING: 0
DIARRHEA: 1
DOUBLE VISION: 0

## 2018-04-30 ENCOUNTER — OFFICE VISIT (OUTPATIENT)
Dept: SURGERY | Facility: CLINIC | Age: 58
End: 2018-04-30
Attending: SURGERY
Payer: COMMERCIAL

## 2018-04-30 ENCOUNTER — TELEPHONE (OUTPATIENT)
Dept: GASTROENTEROLOGY | Facility: CLINIC | Age: 58
End: 2018-04-30

## 2018-04-30 ENCOUNTER — CARE COORDINATION (OUTPATIENT)
Dept: GASTROENTEROLOGY | Facility: CLINIC | Age: 58
End: 2018-04-30

## 2018-04-30 VITALS
DIASTOLIC BLOOD PRESSURE: 67 MMHG | RESPIRATION RATE: 16 BRPM | WEIGHT: 154 LBS | BODY MASS INDEX: 25.63 KG/M2 | HEART RATE: 78 BPM | OXYGEN SATURATION: 100 % | SYSTOLIC BLOOD PRESSURE: 95 MMHG | TEMPERATURE: 97 F

## 2018-04-30 DIAGNOSIS — C25.9 PANCREAS CANCER (H): ICD-10-CM

## 2018-04-30 DIAGNOSIS — C25.9 PANCREAS CANCER (H): Primary | ICD-10-CM

## 2018-04-30 LAB
ALBUMIN SERPL-MCNC: 3.6 G/DL (ref 3.4–5)
ALP SERPL-CCNC: 133 U/L (ref 40–150)
ALT SERPL W P-5'-P-CCNC: 23 U/L (ref 0–50)
ANION GAP SERPL CALCULATED.3IONS-SCNC: 6 MMOL/L (ref 3–14)
AST SERPL W P-5'-P-CCNC: 26 U/L (ref 0–45)
BASOPHILS # BLD AUTO: 0.1 10E9/L (ref 0–0.2)
BASOPHILS NFR BLD AUTO: 1.1 %
BILIRUB SERPL-MCNC: 0.3 MG/DL (ref 0.2–1.3)
BUN SERPL-MCNC: 13 MG/DL (ref 7–30)
CALCIUM SERPL-MCNC: 9.4 MG/DL (ref 8.5–10.1)
CHLORIDE SERPL-SCNC: 105 MMOL/L (ref 94–109)
CO2 SERPL-SCNC: 28 MMOL/L (ref 20–32)
CREAT SERPL-MCNC: 0.61 MG/DL (ref 0.52–1.04)
DIFFERENTIAL METHOD BLD: ABNORMAL
EOSINOPHIL # BLD AUTO: 0.7 10E9/L (ref 0–0.7)
EOSINOPHIL NFR BLD AUTO: 7.4 %
ERYTHROCYTE [DISTWIDTH] IN BLOOD BY AUTOMATED COUNT: 14.8 % (ref 10–15)
GFR SERPL CREATININE-BSD FRML MDRD: >90 ML/MIN/1.7M2
GLUCOSE SERPL-MCNC: 185 MG/DL (ref 70–99)
HCT VFR BLD AUTO: 35 % (ref 35–47)
HGB BLD-MCNC: 11.1 G/DL (ref 11.7–15.7)
IMM GRANULOCYTES # BLD: 0 10E9/L (ref 0–0.4)
IMM GRANULOCYTES NFR BLD: 0.3 %
LYMPHOCYTES # BLD AUTO: 2.8 10E9/L (ref 0.8–5.3)
LYMPHOCYTES NFR BLD AUTO: 29.8 %
MCH RBC QN AUTO: 31.7 PG (ref 26.5–33)
MCHC RBC AUTO-ENTMCNC: 31.7 G/DL (ref 31.5–36.5)
MCV RBC AUTO: 100 FL (ref 78–100)
MONOCYTES # BLD AUTO: 1.1 10E9/L (ref 0–1.3)
MONOCYTES NFR BLD AUTO: 11.1 %
NEUTROPHILS # BLD AUTO: 4.8 10E9/L (ref 1.6–8.3)
NEUTROPHILS NFR BLD AUTO: 50.3 %
NRBC # BLD AUTO: 0 10*3/UL
NRBC BLD AUTO-RTO: 0 /100
PLATELET # BLD AUTO: 734 10E9/L (ref 150–450)
POTASSIUM SERPL-SCNC: 4 MMOL/L (ref 3.4–5.3)
PROT SERPL-MCNC: 7.9 G/DL (ref 6.8–8.8)
RBC # BLD AUTO: 3.5 10E12/L (ref 3.8–5.2)
SODIUM SERPL-SCNC: 139 MMOL/L (ref 133–144)
WBC # BLD AUTO: 9.4 10E9/L (ref 4–11)

## 2018-04-30 PROCEDURE — 85025 COMPLETE CBC W/AUTO DIFF WBC: CPT | Performed by: SURGERY

## 2018-04-30 PROCEDURE — G0463 HOSPITAL OUTPT CLINIC VISIT: HCPCS | Mod: ZF

## 2018-04-30 PROCEDURE — 99024 POSTOP FOLLOW-UP VISIT: CPT | Mod: ZP | Performed by: SURGERY

## 2018-04-30 PROCEDURE — 80053 COMPREHEN METABOLIC PANEL: CPT | Performed by: SURGERY

## 2018-04-30 PROCEDURE — 36415 COLL VENOUS BLD VENIPUNCTURE: CPT | Performed by: SURGERY

## 2018-04-30 ASSESSMENT — PAIN SCALES - GENERAL: PAINLEVEL: MODERATE PAIN (4)

## 2018-04-30 NOTE — NURSING NOTE
"Oncology Rooming Note    April 30, 2018 11:13 AM   Kitty Bangura is a 57 year old female who presents for:    Chief Complaint   Patient presents with     Oncology Clinic Visit     Post op return     Initial Vitals: Pulse 78  Resp 16  Wt 69.9 kg (154 lb)  SpO2 100%  BMI 25.63 kg/m2 Estimated body mass index is 25.63 kg/(m^2) as calculated from the following:    Height as of 4/17/18: 1.651 m (5' 5\").    Weight as of this encounter: 69.9 kg (154 lb). Body surface area is 1.79 meters squared.  Moderate Pain (4) Comment: Data Unavailable   No LMP recorded. Patient is postmenopausal.  Allergies reviewed: Yes  Medications reviewed: Yes    Medications: Medication refills not needed today.  Pharmacy name entered into [facility]:    [id number] [place], [place] - [address] [place] [address] [id number] - [place], [place] - [address] AT [address]    Clinical concerns: Patient states there are no new concerns to discuss with provider.  Dr Byrd was not notified.       8 minutes for nursing intake (face to face time)     Ewelina Rodriguez CMA              "

## 2018-04-30 NOTE — MR AVS SNAPSHOT
After Visit Summary   4/30/2018    Kitty Bangura    MRN: [id number]           Patient Information     Date Of Birth          1960        Visit Information        Provider Department      4/30/2018 11:00 AM Ja Byrd MD [facility]        Today's Diagnoses     Pancreas cancer (H)    -  1       Follow-ups after your visit        Your next 10 appointments already scheduled     Apr 30, 2018 12:30 PM CDT   LAB with  LAB   [facility])    [address] [place] [address] [id number] [phone]           Please do not eat 10-12 hours before your appointment if you are coming in fasting for labs on lipids, cholesterol, or glucose (sugar). This does not apply to pregnant women. Water, hot tea and black coffee (with nothing added) are okay. Do not drink other fluids, diet soda or chew gum.            May 04, 2018  9:00 AM CDT   (Arrive by 8:45 AM)   New Patient Visit with Brandyn Snow MD   [facility] ([facility])    [address] [place] [address]  [address] [place] [id number] [phone]            May 14, 2018  7:45 AM CDT   (Arrive by 7:30 AM)   Return Visit with Jovany Monk MD   [facility] ([facility])    [address] [place] [address]  [address] [place] [address] [id number] [phone]              Who to contact     If you have questions or need follow up information about today's clinic visit or your schedule please contact [facility] directly at [phone].  Normal or non-critical lab and imaging results will be communicated to you by MyChart, letter or phone within 4 business days after the clinic has received the results. If you do not hear from us within 7 days, please contact the clinic through MyChart or phone. If you have a critical or abnormal lab result, we will notify you by phone as soon as  possible.  Submit refill requests through [employer] or call your pharmacy and they will forward the refill request to us. Please allow 3 business days for your refill to be completed.          Additional Information About Your Visit        [employer] Information     [employer] gives you secure access to your electronic health record. If you see a primary care provider, you can also send messages to your care team and make appointments. If you have questions, please call your primary care clinic.  If you do not have a primary care provider, please call [phone] and they will assist you.        Care EveryWhere ID     This is your Care EveryWhere ID. This could be used by other organizations to access your [facility] medical records  [id number]        Your Vitals Were     Pulse Temperature Respirations Pulse Oximetry BMI (Body Mass Index)       78 97  F (36.1  C) (Oral) 16 100% 25.63 kg/m2        Blood Pressure from Last 3 Encounters:   04/30/18 95/67   04/21/18 96/55   04/02/18 102/70    Weight from Last 3 Encounters:   04/30/18 69.9 kg (154 lb)   04/21/18 74.8 kg (164 lb 12.8 oz)   04/02/18 73.1 kg (161 lb 2.5 oz)               Primary Care Provider Office Phone # Fax #    Solange Julito Mcgill [phone] [address] [id number]        Equal Access to Services     CHASE DE GUZMAN AH: Hadii zaheer chino Sopaulo, waaxda luqadaha, qaybta kaalmada marty, re gerardo . So [facility] [phone].    ATENCIÓN: Si habla español, tiene a quiros disposición servicios gratuitos de asistencia lingüística. Macy al [phone].    We comply with applicable federal civil rights laws and [place] laws. We do not discriminate on the basis of race, color, national origin, age, disability, sex, sexual orientation, or gender identity.            Thank you!     Thank you for choosing [facility]  for your care. Our goal is always to provide you with  excellent care. Hearing back from our patients is one way we can continue to improve our services. Please take a few minutes to complete the written survey that you may receive in the mail after your visit with us. Thank you!             Your Updated Medication List - Protect others around you: Learn how to safely use, store and throw away your medicines at www.disposemymeds.org.          This list is accurate as of 4/30/18 11:44 AM.  Always use your most recent med list.                   Brand Name Dispense Instructions for use Diagnosis    acetaminophen 500 MG tablet    TYLENOL    100 tablet    Take 2 tablets (1,000 mg) by mouth every 8 hours    Acute post-operative pain       albuterol 108 (90 Base) MCG/ACT Inhaler    PROAIR HFA/PROVENTIL HFA/VENTOLIN HFA    1 Inhaler    Inhale 2 puffs into the lungs every 6 hours as needed for shortness of breath / dyspnea or wheezing    Acute bronchitis, unspecified organism       amylase-lipase-protease 70141-33670 units Cpep per EC capsule    CREON    450 capsule    Take 2-3 with meals / 1-2 with snacks, up to 15 per day.    Necrotizing pancreatitis       enoxaparin 40 MG/0.4ML injection    LOVENOX    9.2 mL    Inject 0.4 mLs (40 mg) Subcutaneous daily for 23 days    Pancreatic adenocarcinoma (H)       LORazepam 0.5 MG tablet    ATIVAN    30 tablet    Take 1 tablet (0.5 mg) by mouth every 4 hours as needed (Anxiety, Nausea/Vomiting or Sleep)    Pancreatic adenocarcinoma (H)       ondansetron 8 MG tablet    ZOFRAN    30 tablet    Take 1 tablet (8 mg) by mouth every 8 hours as needed for nausea    Pancreatic adenocarcinoma (H)       oxyCODONE IR 5 MG tablet    ROXICODONE    30 tablet    Take 1-2 tablets (5-10 mg) by mouth every 3 hours as needed for moderate to severe pain    Acute post-operative pain       pantoprazole 40 MG EC tablet    PROTONIX    30 tablet    Take 1 tablet (40 mg) by mouth every morning    Pancreatic adenocarcinoma (H)       polyethylene glycol Packet     MIRALAX/GLYCOLAX    30 packet    Take 17 g by mouth daily    Drug-induced constipation       prochlorperazine 10 MG tablet    COMPAZINE    30 tablet    Take 1 tablet (10 mg) by mouth every 6 hours as needed (Nausea/Vomiting)    Pancreatic adenocarcinoma (H)

## 2018-04-30 NOTE — LETTER
4/30/2018       RE: Kitty Bangura  [address] [address] [facility] [address]     Dear Colleague,    Thank you for referring your patient, Kitty Bangura, to the [facility]. Please see a copy of my visit note below.    FOLLOW-UP  Apr 30, 2018    Kitty Bangura is a 57 year old female who returns for her first post-operative follow-up visit.    HPI:  She underwent a diagnostic laparoscopy, splenic flexure mobilization, intraoperative ultrasound, distal pancreatectomy and splenectomy, and cholecystectomy on April 17, 2018.  She is currently 2 week(s) post-op.  Final surgical pathology showed necrotizing pancreatitis, no evidence of residual cancer, complete pathologic response, 26 benign lymph nodes.    Since the procedure, she has done well. She denies any fevers. She is tolerating a diet and having bowel movements. She has been off pain medicine for the last few days. She has no complaints.    BP 95/67  Pulse 78  Temp 97  F (36.1  C) (Oral)  Resp 16  Wt 69.9 kg (154 lb)  SpO2 100%  BMI 25.63 kg/m2   Physical Exam  General: No acute distress, appears to be doing quite well.  Head and Neck: Anicteric sclera.  Chest: Bilateral chest rise, regular rate and rhythm.  Abdomen: Abdominal incision is healing well and is without any evidence of infection. Soft, nontender, nondistended.  Extremities: No lower extremity edema.    INVESTIGATIONS:  Labs: Pending for today.    Surgical Pathology (April 17, 2018):  No evidence for residual cancer, and necrotizing pancreatitis, complete pathologic response, 26 benign lymph nodes.    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    She had no complications  in the postoperative period and continues to have an uneventful recovery. She is scheduled to see Dr. Monk in 2 weeks to discuss adjuvant chemotherapy. I will also see her at that time. However discuss the patient with Dr. Metz as well.    All of the above was discussed with the patient and all questions were answered.     Ja Byrd MD    Division of Surgical Oncology  [facility]

## 2018-04-30 NOTE — PROGRESS NOTES
FOLLOW-UP  Apr 30, 2018    Kitty Bangura is a 57 year old female who returns for her first post-operative follow-up visit.    HPI:  She underwent a diagnostic laparoscopy, splenic flexure mobilization, intraoperative ultrasound, distal pancreatectomy and splenectomy, and cholecystectomy on April 17, 2018.  She is currently 2 week(s) post-op.  Final surgical pathology showed necrotizing pancreatitis, no evidence of residual cancer, complete pathologic response, 26 benign lymph nodes.    Since the procedure, she has done well. She denies any fevers. She is tolerating a diet and having bowel movements. She has been off pain medicine for the last few days. She has no complaints.    BP 95/67  Pulse 78  Temp 97  F (36.1  C) (Oral)  Resp 16  Wt 69.9 kg (154 lb)  SpO2 100%  BMI 25.63 kg/m2   Physical Exam  General: No acute distress, appears to be doing quite well.  Head and Neck: Anicteric sclera.  Chest: Bilateral chest rise, regular rate and rhythm.  Abdomen: Abdominal incision is healing well and is without any evidence of infection. Soft, nontender, nondistended.  Extremities: No lower extremity edema.    INVESTIGATIONS:  Labs: Pending for today.    Surgical Pathology (April 17, 2018):  No evidence for residual cancer, and necrotizing pancreatitis, complete pathologic response, 26 benign lymph nodes.    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    She had no complications in the postoperative period and continues to have an uneventful recovery. She is scheduled to see Dr. Monk in 2 weeks to discuss adjuvant chemotherapy. I will also see her at that time. However discuss the patient with Dr. Metz as  well.    All of the above was discussed with the patient and all questions were answered.     Ja Byrd MD    Division of Surgical Oncology  [facility]

## 2018-04-30 NOTE — TELEPHONE ENCOUNTER
Pt recently had surgical resection of distal pancreatic adenocarcinoma. Had pre-existing transgastric stent from cystgastrostomy which was NOT removed surgically due to the degree of scarring and inflammation in the region. This will now need to be removed endoscopically.    Also had pancreatic stent placed in [place] which remains in place - presumably will remove at same procedure.    Emmanuelle - Please arrange for EGD with MAC with me in GI lab to remove these. I will send a note to Dr. Sanches to confirm no need for ERCP and update you if needs ERCP.    WINSTON Metz MD  Associate Professor of Medicine  Division of Gastroenterology, Hepatology and Nutrition  [facility]

## 2018-05-01 DIAGNOSIS — C25.9 PANCREATIC CANCER (H): Primary | ICD-10-CM

## 2018-05-03 ENCOUNTER — TELEPHONE (OUTPATIENT)
Dept: GASTROENTEROLOGY | Facility: CLINIC | Age: 58
End: 2018-05-03

## 2018-05-03 NOTE — TELEPHONE ENCOUNTER
Patient schedulfed for EGD    Indication for procedure. Screening     Referring Provider. Isaías    ? No     Arrival time verified? Patient instructed to arrive at 0730 for MAC.     Facility location verified? [address] , 1st [address]     Instructions given regarding prep and procedure. Transportation policy reviewed and verbalized understanding. Prep reviewed    Prep Type NPO 6-8 hours     Are you taking any anticoagulants or blood thinners? Lovenox- instructed to hold morning dose until after procedure; agreed     Instructions given? Yes     Electronic implanted devices? Denies     Pre procedure teaching completed? Yes    Transportation from procedure? Yes     H&P / Pre op physical completed? Inpatient in April- see notes   Piotr Uriarte RN

## 2018-05-04 ENCOUNTER — OFFICE VISIT (OUTPATIENT)
Dept: PALLIATIVE CARE | Facility: CLINIC | Age: 58
End: 2018-05-04
Attending: INTERNAL MEDICINE
Payer: COMMERCIAL

## 2018-05-04 VITALS
TEMPERATURE: 98.5 F | HEIGHT: 65 IN | WEIGHT: 153 LBS | SYSTOLIC BLOOD PRESSURE: 101 MMHG | DIASTOLIC BLOOD PRESSURE: 66 MMHG | OXYGEN SATURATION: 97 % | RESPIRATION RATE: 16 BRPM | BODY MASS INDEX: 25.49 KG/M2 | HEART RATE: 86 BPM

## 2018-05-04 DIAGNOSIS — C25.9 PANCREATIC ADENOCARCINOMA (H): Primary | ICD-10-CM

## 2018-05-04 PROCEDURE — 99204 OFFICE O/P NEW MOD 45 MIN: CPT | Performed by: INTERNAL MEDICINE

## 2018-05-04 PROCEDURE — G0463 HOSPITAL OUTPT CLINIC VISIT: HCPCS | Mod: ZF

## 2018-05-04 ASSESSMENT — PAIN SCALES - GENERAL: PAINLEVEL: NO PAIN (0)

## 2018-05-04 NOTE — LETTER
5/4/2018       RE: Kitty Bangura  [address] [employer] [address]     Dear Colleague,    Thank you for referring your patient, Kitty Bangura, to the [facility] at [facility]. Please see a copy of my visit note below.    Palliative Staff/Outpatient Clinic    (This note was transcribed using voice recognition software. While I review and edit the transcription, I may miss errors. Also, the software capitalizes words strangely sometimes. 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  Complicated by necrotizing pancreatitis.  Status post splenectomy cholecystectomy and distal pancreatectomy in April of this year. Final surgical pathology showed necrotizing pancreatitis, no evidence of residual cancer, complete pathologic response, 26 benign lymph nodes.    History:  She is with her  today who supplements the history.  She is a couple weeks out from her distal pancreatectomy and feels like she is doing well.  She continues to have some gas and bloating and abdominal pain but overall this is tolerable.  She has a lot of constipation so much so she has an anal fissure.  Earlier this week she took a [place] of milk of magnesia and had diarrhea.  She has tried smooth move tea with good results.    She has bilateral mouth sores-she did have mucositis with her chemo.  She has 2 spots on her bilateral cheeks near her molars which she continues to bite and reinjure.  Otherwise her mouth sores and thrush have resolved.    Mobility is okay and she is walking daily.  She is sleeping okay.  Mood and spirits are good.  She describes herself as an optimistic person who is told is an 85% chance her cancer would be cured and she feels very good about  "that.      SH:   Lives with her , 29-year-old son is with him as well.  No grandchildren.    ROS: Patient completed comprehensive electronic ROS form reviewed and confirmed key results with patient today.     PE: /66  Pulse 86  Temp 98.5  F (36.9  C) (Oral)  Resp 16  Ht 1.651 m (5' 5\")  Wt 69.4 kg (153 lb)  SpO2 97%  BMI 25.46 kg/m2  Alert, comfortable appearing, NAD. Alopecia+  Head NCAT.  Eyes anicteric without injection  Face symmetric, eyes conjugate  Mouth pink, moist, 2 superficial ulcers bilat cheeks, no mass or induration or redness  Neck supple without masses, thyromegaly, LAD. Unremarkable R chest Mediport  Lungs unlabored, CTAB  CV rrr s1s2  Abd soft, nondistended  Back well aligned, no masses, tenderness  No LE edema  Skin warm, dry, without lesions  MSK joints of hand normal;   Neuro Face symmetric,   Neuropsych exam normal including affect, sensorium, gross memory, thought processes, and fund of knowledge.     Impression & Recommendations:  57-year-old woman with stage I cystic distal pancreatic cancer status post distal pancreatectomy after neoadjuvant chemotherapy is doing well physically overall.  Adjuvant chemo is planned.  Discussed with her general quality of life issues and concerns and the role of the palliative care program.  Mood and coping are doing well.  Suggested she try gentle laxatives instead of milk of magnesia, for instance the smooth move tea daily or every other day.  She is in good hands with her surgical and medical oncology teams.  We are happy to see her back at any time particularly if she is having significant quality of life issues with her adjuvant chemo.  Otherwise follow-up as needed.    Chart data reviewed today:      Family History   Problem Relation Age of Onset     HEART DISEASE Father      Hyperlipidemia Father      HEART DISEASE Brother      DIABETES Mother      Hypertension Mother      Obesity Mother      DIABETES Maternal Grandfather      " Hypertension Maternal Grandfather      Obesity Maternal Grandfather      DIABETES Sister      Hypertension Sister      Depression Sister      Anxiety Disorder Sister      Obesity Sister      Hypertension Brother      Hyperlipidemia Brother      Breast Cancer Cousin      Breast Cancer Cousin      Breast Cancer Cousin      Colon Cancer Other      Other Cancer Other      Mesothelioma     Depression Sister      Anxiety Disorder Brother      Anxiety Disorder Son      MENTAL ILLNESS Sister      Schizophrenia     Obesity Maternal Grandmother      Obesity Sister      Past Medical History:   Diagnosis Date     Necrotizing pancreatitis      Pancreatic cancer (H)      PONV (postoperative nausea and vomiting)      Past Surgical History:   Procedure Laterality Date     ABDOMEN SURGERY  1983    Ruptured tubal pregnancies, additional surgeries followed     BACK SURGERY  2004    L5, S1 discectomy     BREAST SURGERY  2003    Breast reduction     COLONOSCOPY  2008     ENDOSCOPIC RETROGRADE CHOLANGIOPANCREATOGRAM N/A 1/25/2018    Procedure: COMBINED ENDOSCOPIC RETROGRADE CHOLANGIOPANCREATOGRAPHY, PLACE TUBE/STENT;  Endoscopic retrograde cholangiopancreatogram with stent placement and cyst drainage bile ;  Surgeon: Vito Sanches MD;  Location: [facility] OR     ENDOSCOPIC ULTRASOUND LOWER GASTROINTESTIONAL TRACT (GI) N/A 1/25/2018    Procedure: ENDOSCOPIC ULTRASOUND LOWER GASTROINTESTIONAL TRACT (GI);  Endoscopic Ultrasound with guided Cyst-Gastrostomy;  Surgeon: González Metz MD;  Location: [facility] OR     ENDOSCOPIC ULTRASOUND, ESOPHAGOSCOPY, GASTROSCOPY, DUODENOSCOPY (EGD), NECROSECTOMY N/A 12/4/2017    Procedure: ENDOSCOPIC ULTRASOUND, ESOPHAGOSCOPY, GASTROSCOPY, DUODENOSCOPY (EGD), NECROSECTOMY;  Esophagogastroduodenoscopy, with  Necrosectomy and stents replacement  ;  Surgeon: González Metz MD;  Location: [facility] OR     ENDOSCOPIC ULTRASOUND, ESOPHAGOSCOPY, GASTROSCOPY, DUODENOSCOPY (EGD), NECROSECTOMY N/A 12/12/2017     Procedure: ENDOSCOPIC ULTRASOUND, ESOPHAGOSCOPY, GASTROSCOPY, DUODENOSCOPY (EGD), NECROSECTOMY;  Esophagogastroduodenoscopy with Necrosectomy, Balloon Dilation, stent removal X3 and Cystgastrostomy stent Placement X2;  Surgeon: Guru Cecilai Staples MD;  Location: [facility] OR     ESOPHAGOSCOPY, GASTROSCOPY, DUODENOSCOPY (EGD), COMBINED N/A 2017    Procedure: COMBINED ENDOSCOPIC ULTRASOUND, ESOPHAGOSCOPY, GASTROSCOPY, DUODENOSCOPY (EGD), FINE NEEDLE ASPIRATE/BIOPSY;  Endoscopic Ultrasound with cystgastrostomy and fine needle aspiration ;  Surgeon: González Metz MD;  Location: [facility] OR     GYN SURGERY  1983    Multiple ectopic pregnancies, reconnect,       INSERT PORT VASCULAR ACCESS Right 2018    Procedure: INSERT PORT VASCULAR ACCESS;  Chest Port Placement - right;  Surgeon: Chanda Lawson PA-C;  Location: [facility] OR     LAPAROSCOPY DIAGNOSTIC (GENERAL) N/A 2018    Procedure: LAPAROSCOPY DIAGNOSTIC (GENERAL);  Diagnostic Laparoscopy; Open Distal Pancreatectomy And Splenectomy, splenic flexure mobilization, cholecystectomy, intraoperative ultrasound;  Surgeon: Ja Byrd MD;  Location: [facility] OR     PANCREATECTOMY, SPLENECTOMY N/A 2018    Procedure: PANCREATECTOMY, SPLENECTOMY;;  Surgeon: Ja Byrd MD;  Location: [facility] OR     No Known Allergies         Medications:   I have reviewed this patient's medication profile.  MNPMP: reviewed      Data reviewed:  I reviewed recent labs and imaging, comments on pertinent findings:  Cr 0.6    CT preop  IMPRESSION:   1. No significant change in overall size of mixed cystic and solid  lesion in the junction of the body and tail of the pancreas  corresponding to the patient's known adenocarcinoma by FNA. However,  the enhancing soft tissue component in the left superior lateral  portion of the lesion continues to decrease in size since the prior  exam.  2. Minimal abutment of the splenic artery by the superior margin of  the lesion.  No other evidence of involvement of the upper abdominal  vasculature.  3. Unchanged fibrosis in the neck of the pancreas consistent with  prior necrotizing pancreatitis.  4. Unchanged peripancreatic lymphadenopathy, indeterminate for  metastasis versus reactive in the setting of recent pancreatitis.  5. Stable hypodense lesion in the right hepatic lobe characterized as  hemangioma by MRI 12/10/2017.  Thank you for involving us in the patient's care.   Brandyn Snow MD / Palliative Medicine / Pager [phone] / [facility] Inpatient Team Consult Pager [phone] (answered 8am-430pm M-F) - ok to text page via [employer] / After-Hours Answering Service [phone] / Palliative Clinic in the [facility] at the [facility] - [phone] (scheduling); [phone] (triage).

## 2018-05-04 NOTE — MR AVS SNAPSHOT
After Visit Summary   5/4/2018    Kitty Bangura    MRN: [id number]           Patient Information     Date Of Birth          1960        Visit Information        Provider Department      5/4/2018 9:00 AM Brandyn Snow MD [facility]        Today's Diagnoses     Pancreatic adenocarcinoma (H)    -  1       Follow-ups after your visit        Your next 10 appointments already scheduled     May 08, 2018   Procedure with González Metz MD   [facility] [place], Endoscopy ([facility], [facility])    [address] [address]   [phone]           The [facility] is located on the corner of [address] and [address] on the [facility]. It is easily accessible from virtually any point in the [place] area, via [address] and [employer].            May 14, 2018  7:00 AM CDT   [facility] Lab Draw with  [facility] LAB DRAW   [facility] Lab Draw ([facility])    [address] [place] [address]  [address]  [facility] [id number] [phone]            May 14, 2018  7:45 AM CDT   (Arrive by 7:30 AM)   Return Visit with Jovany Monk MD   [facility] ([facility])    [address]  [address]  [facility] [id number] [phone]            May 14, 2018  9:00 AM CDT   (Arrive by 8:45 AM)   Return Visit with Ja Byrd MD   [facility] ([facility])    [address] [place] [address]  [address]  [facility] [address] [phone]              Who to contact     If you have questions or need follow up information about today's clinic visit or your schedule please contact [facility] directly at [phone].  Normal or non-critical lab and imaging results will be communicated to you by MyChart, letter or phone within 4 business days after the clinic has received the results.  "If you do not hear from us within 7 days, please contact the clinic through "[employer] or phone. If you have a critical or abnormal lab result, we will notify you by phone as soon as possible.  Submit refill requests through "[employer] or call your pharmacy and they will forward the refill request to us. Please allow 3 business days for your refill to be completed.          Additional Information About Your Visit        [employer] app Information     "[employer] gives you secure access to your electronic health record. If you see a primary care provider, you can also send messages to your care team and make appointments. If you have questions, please call your primary care clinic.  If you do not have a primary care provider, please call [phone] and they will assist you.        Care EveryWhere ID     This is your Care EveryWhere ID. This could be used by other organizations to access your [facility] medical records  [id number]        Your Vitals Were     Pulse Temperature Respirations Height Pulse Oximetry BMI (Body Mass Index)    86 98.5  F (36.9  C) (Oral) 16 1.651 m (5' 5\") 97% 25.46 kg/m2       Blood Pressure from Last 3 Encounters:   05/04/18 101/66   04/30/18 95/67   04/21/18 96/55    Weight from Last 3 Encounters:   05/04/18 69.4 kg (153 lb)   04/30/18 69.9 kg (154 lb)   04/21/18 74.8 kg (164 lb 12.8 oz)              Today, you had the following     No orders found for display       Primary Care Provider Office Phone # Fax #    Solange Julito Mcgill [phone] [address] [address]        Equal Access to Services     CHASE DE GUZMAN : solange Stern, re hernandez. So [facility] [phone].    ATENCIÓN: Si habla español, tiene a quiros disposición servicios gratuitos de asistencia lingüística. Macy al [phone].    We comply with applicable federal civil rights laws and [place] laws. We do not " discriminate on the basis of race, color, national origin, age, disability, sex, sexual orientation, or gender identity.            Thank you!     Thank you for choosing [facility]  for your care. Our goal is always to provide you with excellent care. Hearing back from our patients is one way we can continue to improve our services. Please take a few minutes to complete the written survey that you may receive in the mail after your visit with us. Thank you!             Your Updated Medication List - Protect others around you: Learn how to safely use, store and throw away your medicines at www.disposemymeds.org.          This list is accurate as of 5/4/18  3:18 PM.  Always use your most recent med list.                   Brand Name Dispense Instructions for use Diagnosis    acetaminophen 500 MG tablet    TYLENOL    100 tablet    Take 2 tablets (1,000 mg) by mouth every 8 hours    Acute post-operative pain       albuterol 108 (90 Base) MCG/ACT Inhaler    PROAIR HFA/PROVENTIL HFA/VENTOLIN HFA    1 Inhaler    Inhale 2 puffs into the lungs every 6 hours as needed for shortness of breath / dyspnea or wheezing    Acute bronchitis, unspecified organism       amylase-lipase-protease 14179-22378 units Cpep per EC capsule    CREON    450 capsule    Take 2-3 with meals / 1-2 with snacks, up to 15 per day.    Necrotizing pancreatitis       enoxaparin 40 MG/0.4ML injection    LOVENOX    9.2 mL    Inject 0.4 mLs (40 mg) Subcutaneous daily for 23 days    Pancreatic adenocarcinoma (H)       LORazepam 0.5 MG tablet    ATIVAN    30 tablet    Take 1 tablet (0.5 mg) by mouth every 4 hours as needed (Anxiety, Nausea/Vomiting or Sleep)    Pancreatic adenocarcinoma (H)       ondansetron 8 MG tablet    ZOFRAN    30 tablet    Take 1 tablet (8 mg) by mouth every 8 hours as needed for nausea    Pancreatic adenocarcinoma (H)       oxyCODONE IR 5 MG tablet    ROXICODONE    30 tablet    Take 1-2 tablets (5-10 mg) by mouth  every 3 hours as needed for moderate to severe pain    Acute post-operative pain       pantoprazole 40 MG EC tablet    PROTONIX    30 tablet    Take 1 tablet (40 mg) by mouth every morning    Pancreatic adenocarcinoma (H)       polyethylene glycol Packet    MIRALAX/GLYCOLAX    30 packet    Take 17 g by mouth daily    Drug-induced constipation       prochlorperazine 10 MG tablet    COMPAZINE    30 tablet    Take 1 tablet (10 mg) by mouth every 6 hours as needed (Nausea/Vomiting)    Pancreatic adenocarcinoma (H)

## 2018-05-04 NOTE — NURSING NOTE
"Oncology Rooming Note    May 4, 2018 9:07 AM   Kitty Bangura is a 57 year old female who presents for:    Chief Complaint   Patient presents with     Oncology Clinic Visit     New Pt. Palliative     Initial Vitals: /66  Pulse 86  Temp 98.5  F (36.9  C) (Oral)  Resp 16  Ht 1.651 m (5' 5\")  Wt 69.4 kg (153 lb)  SpO2 97%  BMI 25.46 kg/m2 Estimated body mass index is 25.46 kg/(m^2) as calculated from the following:    Height as of this encounter: 1.651 m (5' 5\").    Weight as of this encounter: 69.4 kg (153 lb). Body surface area is 1.78 meters squared.  No Pain (0) Comment: Data Unavailable   No LMP recorded. Patient is postmenopausal.  Allergies reviewed: Yes  Medications reviewed: Yes    Medications: Medication refills not needed today.  Pharmacy name entered into [employer]:    [id number] - [place], [place] - [address] [place] [address] [id number] - [place], [place] - [address] AT [facility] OF [address] [place] [address]    Clinical concerns: new patient to palliative care today Dr. Snow was notified.    10 minutes for nursing intake (face to face time)     Israel Camejo CMA              "

## 2018-05-04 NOTE — PROGRESS NOTES
Palliative Staff/Outpatient Clinic    (This note was transcribed using voice recognition software. While I review and edit the transcription, I may miss errors. Also, the software capitalizes words strangely sometimes. 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  Complicated by necrotizing pancreatitis.  Status post splenectomy cholecystectomy and distal pancreatectomy in April of this year. Final surgical pathology showed necrotizing pancreatitis, no evidence of residual cancer, complete pathologic response, 26 benign lymph nodes.    History:  She is with her  today who supplements the history.  She is a couple weeks out from her distal pancreatectomy and feels like she is doing well.  She continues to have some gas and bloating and abdominal pain but overall this is tolerable.  She has a lot of constipation so much so she has an anal fissure.  Earlier this week she took a [place] of milk of magnesia and had diarrhea.  She has tried smooth move tea with good results.    She has bilateral mouth sores-she did have mucositis with her chemo.  She has 2 spots on her bilateral cheeks near her molars which she continues to bite and reinjure.  Otherwise her mouth sores and thrush have resolved.    Mobility is okay and she is walking daily.  She is sleeping okay.  Mood and spirits are good.  She describes herself as an optimistic person who is told is an 85% chance her cancer would be cured and she feels very good about that.      SH:   Lives with her , 29-year-old son is with him as well.  No grandchildren.    ROS: Patient completed comprehensive electronic ROS form reviewed and confirmed key results with patient today.     PE: /66  Pulse 86  Temp 98.5  F (36.9  C) (Oral)  Resp 16  Ht 1.651  "m (5' 5\")  Wt 69.4 kg (153 lb)  SpO2 97%  BMI 25.46 kg/m2  Alert, comfortable appearing, NAD. Alopecia+  Head NCAT.  Eyes anicteric without injection  Face symmetric, eyes conjugate  Mouth pink, moist, 2 superficial ulcers bilat cheeks, no mass or induration or redness  Neck supple without masses, thyromegaly, LAD. Unremarkable R chest Mediport  Lungs unlabored, CTAB  CV rrr s1s2  Abd soft, nondistended  Back well aligned, no masses, tenderness  No LE edema  Skin warm, dry, without lesions  MSK joints of hand normal;   Neuro Face symmetric,   Neuropsych exam normal including affect, sensorium, gross memory, thought processes, and fund of knowledge.     Impression & Recommendations:  57-year-old woman with stage I cystic distal pancreatic cancer status post distal pancreatectomy after neoadjuvant chemotherapy is doing well physically overall.  Adjuvant chemo is planned.  Discussed with her general quality of life issues and concerns and the role of the palliative care program.  Mood and coping are doing well.  Suggested she try gentle laxatives instead of milk of magnesia, for instance the smooth move tea daily or every other day.  She is in good hands with her surgical and medical oncology teams.  We are happy to see her back at any time particularly if she is having significant quality of life issues with her adjuvant chemo.  Otherwise follow-up as needed.    Chart data reviewed today:      Family History   Problem Relation Age of Onset     HEART DISEASE Father      Hyperlipidemia Father      HEART DISEASE Brother      DIABETES Mother      Hypertension Mother      Obesity Mother      DIABETES Maternal Grandfather      Hypertension Maternal Grandfather      Obesity Maternal Grandfather      DIABETES Sister      Hypertension Sister      Depression Sister      Anxiety Disorder Sister      Obesity Sister      Hypertension Brother      Hyperlipidemia Brother      Breast Cancer Cousin      Breast Cancer Cousin      " Breast Cancer Cousin      Colon Cancer Other      Other Cancer Other      Mesothelioma     Depression Sister      Anxiety Disorder Brother      Anxiety Disorder Son      MENTAL ILLNESS Sister      Schizophrenia     Obesity Maternal Grandmother      Obesity Sister      Past Medical History:   Diagnosis Date     Necrotizing pancreatitis      Pancreatic cancer (H)      PONV (postoperative nausea and vomiting)      Past Surgical History:   Procedure Laterality Date     ABDOMEN SURGERY  1983    Ruptured tubal pregnancies, additional surgeries followed     BACK SURGERY  2004    L5, S1 discectomy     BREAST SURGERY  2003    Breast reduction     COLONOSCOPY  2008     ENDOSCOPIC RETROGRADE CHOLANGIOPANCREATOGRAM N/A 1/25/2018    Procedure: COMBINED ENDOSCOPIC RETROGRADE CHOLANGIOPANCREATOGRAPHY, PLACE TUBE/STENT;  Endoscopic retrograde cholangiopancreatogram with stent placement and cyst drainage bile ;  Surgeon: Vito Sanches MD;  Location: [facility] OR     ENDOSCOPIC ULTRASOUND LOWER GASTROINTESTIONAL TRACT (GI) N/A 1/25/2018    Procedure: ENDOSCOPIC ULTRASOUND LOWER GASTROINTESTIONAL TRACT (GI);  Endoscopic Ultrasound with guided Cyst-Gastrostomy;  Surgeon: González Metz MD;  Location: [facility] OR     ENDOSCOPIC ULTRASOUND, ESOPHAGOSCOPY, GASTROSCOPY, DUODENOSCOPY (EGD), NECROSECTOMY N/A 12/4/2017    Procedure: ENDOSCOPIC ULTRASOUND, ESOPHAGOSCOPY, GASTROSCOPY, DUODENOSCOPY (EGD), NECROSECTOMY;  Esophagogastroduodenoscopy, with  Necrosectomy and stents replacement  ;  Surgeon: González Metz MD;  Location: [facility] OR     ENDOSCOPIC ULTRASOUND, ESOPHAGOSCOPY, GASTROSCOPY, DUODENOSCOPY (EGD), NECROSECTOMY N/A 12/12/2017    Procedure: ENDOSCOPIC ULTRASOUND, ESOPHAGOSCOPY, GASTROSCOPY, DUODENOSCOPY (EGD), NECROSECTOMY;  Esophagogastroduodenoscopy with Necrosectomy, Balloon Dilation, stent removal X3 and Cystgastrostomy stent Placement X2;  Surgeon: Guru Cecilia Staples MD;  Location: [facility] OR      ESOPHAGOSCOPY, GASTROSCOPY, DUODENOSCOPY (EGD), COMBINED N/A 2017    Procedure: COMBINED ENDOSCOPIC ULTRASOUND, ESOPHAGOSCOPY, GASTROSCOPY, DUODENOSCOPY (EGD), FINE NEEDLE ASPIRATE/BIOPSY;  Endoscopic Ultrasound with cystgastrostomy and fine needle aspiration ;  Surgeon: González Metz MD;  Location: [facility] OR     GYN SURGERY  1983    Multiple ectopic pregnancies, reconnect,       INSERT PORT VASCULAR ACCESS Right 2018    Procedure: INSERT PORT VASCULAR ACCESS;  Chest Port Placement - right;  Surgeon: Chanda Lawson PA-C;  Location: [facility] OR     LAPAROSCOPY DIAGNOSTIC (GENERAL) N/A 2018    Procedure: LAPAROSCOPY DIAGNOSTIC (GENERAL);  Diagnostic Laparoscopy; Open Distal Pancreatectomy And Splenectomy, splenic flexure mobilization, cholecystectomy, intraoperative ultrasound;  Surgeon: Ja Byrd MD;  Location: [facility] OR     PANCREATECTOMY, SPLENECTOMY N/A 2018    Procedure: PANCREATECTOMY, SPLENECTOMY;;  Surgeon: Ja Byrd MD;  Location: [facility] OR     No Known Allergies         Medications:   I have reviewed this patient's medication profile.  MNPMP: reviewed      Data reviewed:  I reviewed recent labs and imaging, comments on pertinent findings:  Cr 0.6    CT preop  IMPRESSION:   1. No significant change in overall size of mixed cystic and solid  lesion in the junction of the body and tail of the pancreas  corresponding to the patient's known adenocarcinoma by FNA. However,  the enhancing soft tissue component in the left superior lateral  portion of the lesion continues to decrease in size since the prior  exam.  2. Minimal abutment of the splenic artery by the superior margin of  the lesion. No other evidence of involvement of the upper abdominal  vasculature.  3. Unchanged fibrosis in the neck of the pancreas consistent with  prior necrotizing pancreatitis.  4. Unchanged peripancreatic lymphadenopathy, indeterminate for  metastasis versus reactive in the setting of recent  pancreatitis.  5. Stable hypodense lesion in the right hepatic lobe characterized as  hemangioma by MRI 12/10/2017.  Thank you for involving us in the patient's care.   Brandyn Snow MD / Palliative Medicine / Pager [phone] / [facility] Inpatient Team Consult Pager [phone] (answered 8am-430pm M-F) - ok to text page via [email or web address] / After-Hours Answering Service [phone] / Palliative Clinic in the [facility] the [facility] [place] - [phone] (scheduling); [phone] (triage).

## 2018-05-08 ENCOUNTER — SURGERY (OUTPATIENT)
Age: 58
End: 2018-05-08

## 2018-05-08 ENCOUNTER — ANESTHESIA (OUTPATIENT)
Dept: GASTROENTEROLOGY | Facility: CLINIC | Age: 58
End: 2018-05-08
Payer: COMMERCIAL

## 2018-05-08 ENCOUNTER — HOSPITAL ENCOUNTER (OUTPATIENT)
Facility: CLINIC | Age: 58
Discharge: HOME | End: 2018-05-08
Attending: INTERNAL MEDICINE | Admitting: INTERNAL MEDICINE
Payer: COMMERCIAL

## 2018-05-08 ENCOUNTER — ANESTHESIA EVENT (OUTPATIENT)
Dept: GASTROENTEROLOGY | Facility: CLINIC | Age: 58
End: 2018-05-08
Payer: COMMERCIAL

## 2018-05-08 VITALS
OXYGEN SATURATION: 98 % | RESPIRATION RATE: 15 BRPM | TEMPERATURE: 97.7 F | HEIGHT: 65 IN | BODY MASS INDEX: 24.99 KG/M2 | HEART RATE: 83 BPM | DIASTOLIC BLOOD PRESSURE: 58 MMHG | SYSTOLIC BLOOD PRESSURE: 87 MMHG | WEIGHT: 150 LBS

## 2018-05-08 LAB — UPPER GI ENDOSCOPY: NORMAL

## 2018-05-08 PROCEDURE — 37000008 ZZH ANESTHESIA TECHNICAL FEE, 1ST 30 MIN: Performed by: INTERNAL MEDICINE

## 2018-05-08 PROCEDURE — 25000125 ZZHC RX 250: Performed by: NURSE ANESTHETIST, CERTIFIED REGISTERED

## 2018-05-08 PROCEDURE — 43235 EGD DIAGNOSTIC BRUSH WASH: CPT | Performed by: INTERNAL MEDICINE

## 2018-05-08 PROCEDURE — 25000128 H RX IP 250 OP 636: Performed by: NURSE ANESTHETIST, CERTIFIED REGISTERED

## 2018-05-08 PROCEDURE — 43247 EGD REMOVE FOREIGN BODY: CPT | Performed by: INTERNAL MEDICINE

## 2018-05-08 PROCEDURE — 25000125 ZZHC RX 250: Performed by: ANESTHESIOLOGY

## 2018-05-08 PROCEDURE — 37000009 ZZH ANESTHESIA TECHNICAL FEE, EACH ADDTL 15 MIN: Performed by: INTERNAL MEDICINE

## 2018-05-08 RX ORDER — SODIUM CHLORIDE, SODIUM LACTATE, POTASSIUM CHLORIDE, CALCIUM CHLORIDE 600; 310; 30; 20 MG/100ML; MG/100ML; MG/100ML; MG/100ML
INJECTION, SOLUTION INTRAVENOUS CONTINUOUS PRN
Status: DISCONTINUED | OUTPATIENT
Start: 2018-05-08 | End: 2018-05-08

## 2018-05-08 RX ORDER — EPHEDRINE SULFATE 50 MG/ML
INJECTION, SOLUTION INTRAMUSCULAR; INTRAVENOUS; SUBCUTANEOUS PRN
Status: DISCONTINUED | OUTPATIENT
Start: 2018-05-08 | End: 2018-05-08

## 2018-05-08 RX ORDER — PROPOFOL 10 MG/ML
INJECTION, EMULSION INTRAVENOUS PRN
Status: DISCONTINUED | OUTPATIENT
Start: 2018-05-08 | End: 2018-05-08

## 2018-05-08 RX ORDER — ONDANSETRON 2 MG/ML
4 INJECTION INTRAMUSCULAR; INTRAVENOUS EVERY 30 MIN PRN
Status: DISCONTINUED | OUTPATIENT
Start: 2018-05-08 | End: 2018-05-08 | Stop reason: HOSPADM

## 2018-05-08 RX ORDER — GLYCOPYRROLATE 0.2 MG/ML
INJECTION, SOLUTION INTRAMUSCULAR; INTRAVENOUS PRN
Status: DISCONTINUED | OUTPATIENT
Start: 2018-05-08 | End: 2018-05-08

## 2018-05-08 RX ORDER — FENTANYL CITRATE 50 UG/ML
INJECTION, SOLUTION INTRAMUSCULAR; INTRAVENOUS PRN
Status: DISCONTINUED | OUTPATIENT
Start: 2018-05-08 | End: 2018-05-08

## 2018-05-08 RX ORDER — PROPOFOL 10 MG/ML
INJECTION, EMULSION INTRAVENOUS CONTINUOUS PRN
Status: DISCONTINUED | OUTPATIENT
Start: 2018-05-08 | End: 2018-05-08

## 2018-05-08 RX ORDER — DEXAMETHASONE SODIUM PHOSPHATE 4 MG/ML
INJECTION, SOLUTION INTRA-ARTICULAR; INTRALESIONAL; INTRAMUSCULAR; INTRAVENOUS; SOFT TISSUE PRN
Status: DISCONTINUED | OUTPATIENT
Start: 2018-05-08 | End: 2018-05-08

## 2018-05-08 RX ORDER — SCOLOPAMINE TRANSDERMAL SYSTEM 1 MG/1
1 PATCH, EXTENDED RELEASE TRANSDERMAL
Status: DISCONTINUED | OUTPATIENT
Start: 2018-05-08 | End: 2018-05-08 | Stop reason: HOSPADM

## 2018-05-08 RX ORDER — OXYCODONE HYDROCHLORIDE 5 MG/1
5 TABLET ORAL EVERY 4 HOURS PRN
Status: DISCONTINUED | OUTPATIENT
Start: 2018-05-08 | End: 2018-05-08 | Stop reason: HOSPADM

## 2018-05-08 RX ORDER — FENTANYL CITRATE 50 UG/ML
25-50 INJECTION, SOLUTION INTRAMUSCULAR; INTRAVENOUS
Status: DISCONTINUED | OUTPATIENT
Start: 2018-05-08 | End: 2018-05-08 | Stop reason: HOSPADM

## 2018-05-08 RX ORDER — MEPERIDINE HYDROCHLORIDE 50 MG/ML
12.5 INJECTION INTRAMUSCULAR; INTRAVENOUS; SUBCUTANEOUS
Status: DISCONTINUED | OUTPATIENT
Start: 2018-05-08 | End: 2018-05-08 | Stop reason: HOSPADM

## 2018-05-08 RX ORDER — ONDANSETRON 2 MG/ML
INJECTION INTRAMUSCULAR; INTRAVENOUS PRN
Status: DISCONTINUED | OUTPATIENT
Start: 2018-05-08 | End: 2018-05-08

## 2018-05-08 RX ORDER — NALOXONE HYDROCHLORIDE 0.4 MG/ML
.1-.4 INJECTION, SOLUTION INTRAMUSCULAR; INTRAVENOUS; SUBCUTANEOUS
Status: DISCONTINUED | OUTPATIENT
Start: 2018-05-08 | End: 2018-05-08 | Stop reason: HOSPADM

## 2018-05-08 RX ORDER — SODIUM CHLORIDE, SODIUM LACTATE, POTASSIUM CHLORIDE, CALCIUM CHLORIDE 600; 310; 30; 20 MG/100ML; MG/100ML; MG/100ML; MG/100ML
INJECTION, SOLUTION INTRAVENOUS CONTINUOUS
Status: DISCONTINUED | OUTPATIENT
Start: 2018-05-08 | End: 2018-05-08 | Stop reason: HOSPADM

## 2018-05-08 RX ORDER — ONDANSETRON 4 MG/1
4 TABLET, ORALLY DISINTEGRATING ORAL EVERY 30 MIN PRN
Status: DISCONTINUED | OUTPATIENT
Start: 2018-05-08 | End: 2018-05-08 | Stop reason: HOSPADM

## 2018-05-08 RX ADMIN — PROPOFOL 125 MCG/KG/MIN: 10 INJECTION, EMULSION INTRAVENOUS at 09:06

## 2018-05-08 RX ADMIN — SCOLOPAMINE TRANSDERMAL SYSTEM 1 PATCH: 1 PATCH, EXTENDED RELEASE TRANSDERMAL at 08:40

## 2018-05-08 RX ADMIN — BENZOCAINE 1 EACH: 220 SPRAY, METERED PERIODONTAL at 09:00

## 2018-05-08 RX ADMIN — DEXAMETHASONE SODIUM PHOSPHATE 6 MG: 4 INJECTION, SOLUTION INTRA-ARTICULAR; INTRALESIONAL; INTRAMUSCULAR; INTRAVENOUS; SOFT TISSUE at 09:17

## 2018-05-08 RX ADMIN — Medication 5 MG: at 09:16

## 2018-05-08 RX ADMIN — Medication 10 MG: at 09:14

## 2018-05-08 RX ADMIN — FENTANYL CITRATE 50 MCG: 50 INJECTION, SOLUTION INTRAMUSCULAR; INTRAVENOUS at 09:07

## 2018-05-08 RX ADMIN — MIDAZOLAM 1 MG: 1 INJECTION INTRAMUSCULAR; INTRAVENOUS at 09:00

## 2018-05-08 RX ADMIN — SODIUM CHLORIDE, POTASSIUM CHLORIDE, SODIUM LACTATE AND CALCIUM CHLORIDE: 600; 310; 30; 20 INJECTION, SOLUTION INTRAVENOUS at 08:54

## 2018-05-08 RX ADMIN — GLYCOPYRROLATE 0.2 MG: 0.2 INJECTION, SOLUTION INTRAMUSCULAR; INTRAVENOUS at 09:16

## 2018-05-08 RX ADMIN — PROPOFOL 20 MG: 10 INJECTION, EMULSION INTRAVENOUS at 09:11

## 2018-05-08 RX ADMIN — PHENYLEPHRINE HYDROCHLORIDE 100 MCG: 10 INJECTION, SOLUTION INTRAMUSCULAR; INTRAVENOUS; SUBCUTANEOUS at 09:14

## 2018-05-08 RX ADMIN — PHENYLEPHRINE HYDROCHLORIDE 100 MCG: 10 INJECTION, SOLUTION INTRAMUSCULAR; INTRAVENOUS; SUBCUTANEOUS at 09:28

## 2018-05-08 RX ADMIN — ONDANSETRON 4 MG: 2 INJECTION INTRAMUSCULAR; INTRAVENOUS at 09:00

## 2018-05-08 ASSESSMENT — LIFESTYLE VARIABLES: TOBACCO_USE: 1

## 2018-05-08 NOTE — DISCHARGE INSTRUCTIONS
[facility] Upper Endoscopy (EGD) with Monitored Anesthesia Care  For 24 hours after your procedure  Sedation:  1. Get plenty of rest. A responsible adult must stay with you for at least 24 hours after you leave the hospital.   2. Do not drive or use heavy equipment. If you have weakness or tingling, don't drive or use heavy equipment until this feeling goes away.  3. Do not drink alcohol.  4. Avoid strenuous or risky activities. Ask for help when climbing stairs.   5. You may feel lightheaded. IF so, sit for a few minutes before standing. Have someone help you get up.   6. If you have nausea (feel sick to your stomach): Drink only clear liquids such as apple juice, ginger ale, broth or 7-Up. Rest may also help. Be sure to drink enough fluids. Move to a regular diet as you feel able.  7. You may have a slight fever. Call the doctor if your fever is over 100 F (37.7 C) (taken under the tongue) or lasts longer than 24 hours.  8. You may have a dry mouth, a sore throat, muscle aches or trouble sleeping. These should go away after 24 hours.  9. Do not make important or legal decisions.   Procedural:  1. Wait one hour before eating or drinking. Start with sips of water. When your gag reflex has returned, you may return to your normal diet, medicines, and light exercise.  2. Some bloating is normal. You may have large burps or pass air.  3. You may have a sore throat for 2 to 3 days. If so, it may help to:    Avoid hot liquids for 24 hours.    Use sore throat lozenges.    Gargle as need with salt water up to 4 times a day. Mix 1 cup of warm water with 1 teaspoon of salt. Do not swallow.  4. You may take Tylenol (acetaminophen) for pain unless your doctor has told you not to.   Do not take aspirin or ibuprofen (Advil, Motrin, or other anti-inflammatory  drugs) for __3___ days.  Call right away if you have:  1. Unusual pain in belly or chest pain not relieved with passing air.  2. Severe throat pain or trouble  swallowing.  3. Black stools (tar-like looking bowel movement).  4. It has been over 8 to 10 hours since surgery and you are still not able to urinate (pass water).  5. Headache for over 24 hours.  6.   Follow-up:    If you have severe pain, bleeding, vomit blood, or shortness of breath, go to an emergency room.  If you have questions, call:  Endoscopy: Monday to Friday, 7 a.m. to 4:30 p.m. [phone] (We may have to call you back)  After hours: Hospital  [phone] (Ask for the GI fellow on call)

## 2018-05-08 NOTE — OR NURSING
Saline flushed port 10ml and then heparinized port with heparin lock flush 5ml 100units/ml and then deaccessed port and applied dressing.

## 2018-05-08 NOTE — OR NURSING
Patient preparing for procedure. Has history of PONV. Applied Scopolamine patch behind left ear at 0840. Instructions state may wear for 48 hours post application. Latricia Mckay RN

## 2018-05-08 NOTE — ANESTHESIA PREPROCEDURE EVALUATION
Anesthesia Evaluation     . Pt has had prior anesthetic. Type: General    No history of anesthetic complications          ROS/MED HX    ENT/Pulmonary:  - neg pulmonary ROS   (+)tobacco use, Past use light smoker in past packs/day  , . .    Neurologic:  - neg neurologic ROS     Cardiovascular:  - neg cardiovascular ROS   (+) ----. : . . . :. . No previous cardiac testing      (-) hypertension, CAD, taking anticoagulants/antiplatelets and dyslipidemia   METS/Exercise Tolerance: Comment: Walks at 3 MPH.  Close to 2 miles and upper arm exercises.  >4 METS   Hematologic:  - neg hematologic  ROS       Musculoskeletal:  - neg musculoskeletal ROS       GI/Hepatic: Comment: Necrotizing pancreatitis  Found to have pancreatic adenocarcinoma        Renal/Genitourinary:  - ROS Renal section negative       Endo:  - neg endo ROS       Psychiatric:  - neg psychiatric ROS       Infectious Disease:  - neg infectious disease ROS       Malignancy:   (+) Malignancy History of GI  GI CA Active status post,         Other:    (+) No chance of pregnancy no H/O Chronic Pain,                   Physical Exam  Normal systems: cardiovascular, pulmonary and dental    Airway   Mallampati: I  TM distance: >3 FB  Neck ROM: full    Dental     Cardiovascular   Rhythm and rate: regular and normal      Pulmonary    breath sounds clear to auscultation                    Anesthesia Plan      History & Physical Review  History and physical reviewed and following examination; no interval change.    ASA Status:  2 .    NPO Status:  > 8 hours    Plan for MAC with Intravenous and Propofol induction. Maintenance will be TIVA.  Reason for MAC:  Difficulty with conscious sedation (QS)  PONV prophylaxis:  Ondansetron (or other 5HT-3) and Dexamethasone or Solumedrol       Postoperative Care  Postoperative pain management:  Oral pain medications.      Consents  Anesthetic plan, risks, benefits and alternatives discussed with:  Patient..

## 2018-05-08 NOTE — IP AVS SNAPSHOT
[facility], [place], Endoscopy    [address] [address]    Phone:  [phone]                                       After Visit Summary   5/8/2018    Kitty Bangura    MRN: [id number]           After Visit Summary Signature Page     I have received my discharge instructions, and my questions have been answered. I have discussed any challenges I see with this plan with the nurse or doctor.    ..........................................................................................................................................  Patient/Patient Representative Signature      ..........................................................................................................................................  Patient Representative Print Name and Relationship to Patient    ..................................................               ................................................  Date                                            Time    ..........................................................................................................................................  Reviewed by Signature/Title    ...................................................              ..............................................  Date                                                            Time

## 2018-05-08 NOTE — ANESTHESIA CARE TRANSFER NOTE
Patient: Kitty Bangura    Procedure(s):  EGD - Wound Class: II-Clean Contaminated   - Wound Class: II-Clean Contaminated    Diagnosis: EGD DX:Pancreas cancer/stent removal prep emailed  Diagnosis Additional Information: No value filed.    Anesthesia Type:   MAC     Note:  Airway :Nasal Cannula  Patient transferred to:Phase II  Comments: VSS.  Report given to RN.Handoff Report: Identifed the Patient, Identified the Reponsible Provider, Reviewed the pertinent medical history, Discussed the surgical course, Reviewed Intra-OP anesthesia mangement and issues during anesthesia, Set expectations for post-procedure period and Allowed opportunity for questions and acknowledgement of understanding      Vitals: (Last set prior to Anesthesia Care Transfer)    CRNA VITALS  5/8/2018 0900 - 5/8/2018 0939      5/8/2018             Resp Rate (set): 10                Electronically Signed By: TYLER Yang CRNA  May 8, 2018  9:39 AM

## 2018-05-08 NOTE — IP AVS SNAPSHOT
MRN:[id number]                      After Visit Summary   5/8/2018    Kitty Bnagura    MRN: [id number]           Thank you!     Thank you for choosing [facility] for your care. Our goal is always to provide you with excellent care. Hearing back from our patients is one way we can continue to improve our services. Please take a few minutes to complete the written survey that you may receive in the mail after you visit with us. Thank you!        Patient Information     Date Of Birth          1960        About your hospital stay     You were admitted on:  May 8, 2018 You last received care in the:  [facility], Endoscopy    You were discharged on:  May 8, 2018       Who to Call     For medical emergencies, please call 911.  For non-urgent questions about your medical care, please call your primary care provider or clinic, [phone]  For questions related to your surgery, please call your surgery clinic        Attending Provider     Provider González Valenzuela MD Gastroenterology       Primary Care Provider Office Phone # Fax #    Josenargis Julito Mcgill [phone]      Your next 10 appointments already scheduled     May 14, 2018  7:00 AM CDT   [facility] with  [facility]   [facility] Lab Draw ([facility])    [address] [place] [address]  [address] [place] [id number] [phone]            May 14, 2018  7:45 AM CDT   (Arrive by 7:30 AM)   Return Visit with Jovany Monk MD   [facility] ([facility])    [address] [place] [address]  [address] [place] [id number] [phone]            May 14, 2018  9:00 AM CDT   (Arrive by 8:45 AM)   Return Visit with Ja Byrd MD   [facility] ([facility])    [address] [place] [address]  [address] [place] [id number] [phone]              Further instructions from your care team        [facility] Upper Endoscopy (EGD) with Monitored Anesthesia Care  For 24 hours after your procedure  Sedation:  1. Get plenty of rest. A responsible adult must stay with you for at least 24 hours after you leave the hospital.   2. Do not drive or use heavy equipment. If you have weakness or tingling, don't drive or use heavy equipment until this feeling goes away.  3. Do not drink alcohol.  4. Avoid strenuous or risky activities. Ask for help when climbing stairs.   5. You may feel lightheaded. IF so, sit for a few minutes before standing. Have someone help you get up.   6. If you have nausea (feel sick to your stomach): Drink only clear liquids such as apple juice, ginger ale, broth or 7-Up. Rest may also help. Be sure to drink enough fluids. Move to a regular diet as you feel able.  7. You may have a slight fever. Call the doctor if your fever is over 100 F (37.7 C) (taken under the tongue) or lasts longer than 24 hours.  8. You may have a dry mouth, a sore throat, muscle aches or trouble sleeping. These should go away after 24 hours.  9. Do not make important or legal decisions.   Procedural:  1. Wait one hour before eating or drinking. Start with sips of water. When your gag reflex has returned, you may return to your normal diet, medicines, and light exercise.  2. Some bloating is normal. You may have large burps or pass air.  3. You may have a sore throat for 2 to 3 days. If so, it may help to:    Avoid hot liquids for 24 hours.    Use sore throat lozenges.    Gargle as need with salt water up to 4 times a day. Mix 1 cup of warm water with 1 teaspoon of salt. Do not swallow.  4. You may take Tylenol (acetaminophen) for pain unless your doctor has told you not to.   Do not take aspirin or ibuprofen (Advil, Motrin, or other anti-inflammatory  drugs) for __3___ days.  Call right away if you have:  1. Unusual pain in belly or chest pain not relieved with passing air.  2. Severe throat pain or trouble  "swallowing.  3. Black stools (tar-like looking bowel movement).  4. It has been over 8 to 10 hours since surgery and you are still not able to urinate (pass water).  5. Headache for over 24 hours.  6.   Follow-up:    If you have severe pain, bleeding, vomit blood, or shortness of breath, go to an emergency room.  If you have questions, call:  Endoscopy: Monday to Friday, 7 a.m. to 4:30 p.m. [phone] (We may have to call you back)  After hours: Hospital  [phone] (Ask for the GI fellow on call)            Pending Results     No orders found from 5/6/2018 to 5/9/2018.            Admission Information     Date & Time Provider Department Dept. Phone    5/8/2018 González Metz MD [facility] [place], Endoscopy [phone]      Your Vitals Were     Blood Pressure Pulse Temperature Respirations Height Weight    86/54 83 97.7  F (36.5  C) (Oral) 7 1.651 m (5' 5\") 68 kg (150 lb)    Pulse Oximetry BMI (Body Mass Index)                96% 24.96 kg/m2          MyChart Information     [facility] gives you secure access to your electronic health record. If you see a primary care provider, you can also send messages to your care team and make appointments. If you have questions, please call your primary care clinic.  If you do not have a primary care provider, please call [phone] and they will assist you.        Care EveryWhere ID     This is your Care EveryWhere ID. This could be used by other organizations to access your [facility] medical records  [id number]        Equal Access to Services     [facility] : Hadii zaheer donovan Sopaulo, waaxda luqadaha, qaybta kaalmare pedraza. So [facility] [phone].    ATENCIÓN: Si habla español, tiene a quiros disposición servicios gratuitos de asistencia lingüística. Llame al [phone].    We comply with applicable federal civil rights laws and [place] laws. We do not discriminate on the basis of race, color, national " origin, age, disability, sex, sexual orientation, or gender identity.               Review of your medicines      UNREVIEWED medicines. Ask your doctor about these medicines        Dose / Directions    acetaminophen 500 MG tablet   Commonly known as:  TYLENOL   Used for:  Acute post-operative pain        Dose:  1000 mg   Take 2 tablets (1,000 mg) by mouth every 8 hours   Quantity:  100 tablet   Refills:  1       albuterol 108 (90 Base) MCG/ACT Inhaler   Commonly known as:  PROAIR HFA/PROVENTIL HFA/VENTOLIN HFA   Used for:  Acute bronchitis, unspecified organism        Dose:  2 puff   Inhale 2 puffs into the lungs every 6 hours as needed for shortness of breath / dyspnea or wheezing   Quantity:  1 Inhaler   Refills:  0       amylase-lipase-protease 49550-46747 units Cpep per EC capsule   Commonly known as:  CREON   Used for:  Necrotizing pancreatitis        Take 2-3 with meals / 1-2 with snacks, up to 15 per day.   Quantity:  450 capsule   Refills:  6       enoxaparin 40 MG/0.4ML injection   Commonly known as:  LOVENOX   Used for:  Pancreatic adenocarcinoma (H)        Dose:  40 mg   Inject 0.4 mLs (40 mg) Subcutaneous daily for 23 days   Quantity:  9.2 mL   Refills:  0       LORazepam 0.5 MG tablet   Commonly known as:  ATIVAN   Used for:  Pancreatic adenocarcinoma (H)        Dose:  0.5 mg   Take 1 tablet (0.5 mg) by mouth every 4 hours as needed (Anxiety, Nausea/Vomiting or Sleep)   Quantity:  30 tablet   Refills:  2       ondansetron 8 MG tablet   Commonly known as:  ZOFRAN   Used for:  Pancreatic adenocarcinoma (H)        Dose:  8 mg   Take 1 tablet (8 mg) by mouth every 8 hours as needed for nausea   Quantity:  30 tablet   Refills:  0       oxyCODONE IR 5 MG tablet   Commonly known as:  ROXICODONE   Used for:  Acute post-operative pain        Dose:  5-10 mg   Take 1-2 tablets (5-10 mg) by mouth every 3 hours as needed for moderate to severe pain   Quantity:  30 tablet   Refills:  0       pantoprazole 40 MG EC  tablet   Commonly known as:  PROTONIX   Used for:  Pancreatic adenocarcinoma (H)        Dose:  40 mg   Take 1 tablet (40 mg) by mouth every morning   Quantity:  30 tablet   Refills:  0       polyethylene glycol Packet   Commonly known as:  MIRALAX/GLYCOLAX   Used for:  Drug-induced constipation        Dose:  17 g   Take 17 g by mouth daily   Quantity:  30 packet   Refills:  0       prochlorperazine 10 MG tablet   Commonly known as:  COMPAZINE   Used for:  Pancreatic adenocarcinoma (H)        Dose:  10 mg   Take 1 tablet (10 mg) by mouth every 6 hours as needed (Nausea/Vomiting)   Quantity:  30 tablet   Refills:  2                Protect others around you: Learn how to safely use, store and throw away your medicines at www.disposemymeds.org.             Medication List: This is a list of all your medications and when to take them. Check marks below indicate your daily home schedule. Keep this list as a reference.      Medications           Morning Afternoon Evening Bedtime As Needed    acetaminophen 500 MG tablet   Commonly known as:  TYLENOL   Take 2 tablets (1,000 mg) by mouth every 8 hours                                albuterol 108 (90 Base) MCG/ACT Inhaler   Commonly known as:  PROAIR HFA/PROVENTIL HFA/VENTOLIN HFA   Inhale 2 puffs into the lungs every 6 hours as needed for shortness of breath / dyspnea or wheezing                                amylase-lipase-protease 61266-26425 units Cpep per EC capsule   Commonly known as:  CREON   Take 2-3 with meals / 1-2 with snacks, up to 15 per day.                                enoxaparin 40 MG/0.4ML injection   Commonly known as:  LOVENOX   Inject 0.4 mLs (40 mg) Subcutaneous daily for 23 days                                LORazepam 0.5 MG tablet   Commonly known as:  ATIVAN   Take 1 tablet (0.5 mg) by mouth every 4 hours as needed (Anxiety, Nausea/Vomiting or Sleep)                                ondansetron 8 MG tablet   Commonly known as:  ZOFRAN   Take 1 tablet  (8 mg) by mouth every 8 hours as needed for nausea                                oxyCODONE IR 5 MG tablet   Commonly known as:  ROXICODONE   Take 1-2 tablets (5-10 mg) by mouth every 3 hours as needed for moderate to severe pain                                pantoprazole 40 MG EC tablet   Commonly known as:  PROTONIX   Take 1 tablet (40 mg) by mouth every morning                                polyethylene glycol Packet   Commonly known as:  MIRALAX/GLYCOLAX   Take 17 g by mouth daily                                prochlorperazine 10 MG tablet   Commonly known as:  COMPAZINE   Take 1 tablet (10 mg) by mouth every 6 hours as needed (Nausea/Vomiting)

## 2018-05-08 NOTE — ANESTHESIA POSTPROCEDURE EVALUATION
Patient: Kitty Bangura    Procedure(s):  EGD - Wound Class: II-Clean Contaminated   - Wound Class: II-Clean Contaminated    Diagnosis:EGD DX:Pancreas cancer/stent removal prep emailed  Diagnosis Additional Information: No value filed.    Anesthesia Type:  MAC    Note:  Anesthesia Post Evaluation    Patient location during evaluation: PACU  Patient participation: Able to fully participate in evaluation  Level of consciousness: awake and alert  Pain management: adequate  Airway patency: patent  Cardiovascular status: hemodynamically stable  Respiratory status: acceptable  Hydration status: stable  PONV: none     Anesthetic complications: None          Last vitals:  Vitals:    05/08/18 0752 05/08/18 0940   BP: 99/79 (!) 86/54   Pulse: 74 83   Resp: 16 (!) 7   Temp: 36.6  C (97.8  F) 36.5  C (97.7  F)   SpO2: 98% 96%         Electronically Signed By: Duane Snowden MD  May 8, 2018  9:51 AM

## 2018-05-12 ASSESSMENT — ENCOUNTER SYMPTOMS
CONSTIPATION: 1
SYNCOPE: 0
SPEECH CHANGE: 0
NIGHT SWEATS: 0
FEVER: 0
ORTHOPNEA: 0
VOMITING: 0
STIFFNESS: 0
LEG PAIN: 0
TROUBLE SWALLOWING: 0
TINGLING: 0
RECTAL PAIN: 1
HYPOTENSION: 1
DOUBLE VISION: 0
HOARSE VOICE: 0
JAUNDICE: 0
INCREASED ENERGY: 0
SKIN CHANGES: 0
DIARRHEA: 0
MEMORY LOSS: 0
EYE WATERING: 1
SLEEP DISTURBANCES DUE TO BREATHING: 0
DECREASED APPETITE: 0
HALLUCINATIONS: 0
BACK PAIN: 1
NUMBNESS: 0
BOWEL INCONTINENCE: 0
HEADACHES: 0
CHILLS: 0
NECK MASS: 0
SMELL DISTURBANCE: 1
EYE REDNESS: 0
JOINT SWELLING: 0
POLYDIPSIA: 0
MUSCLE WEAKNESS: 0
NAIL CHANGES: 0
NECK PAIN: 0
POOR WOUND HEALING: 0
SINUS PAIN: 0
POLYPHAGIA: 0
TASTE DISTURBANCE: 1
DISTURBANCES IN COORDINATION: 0
MUSCLE CRAMPS: 0
PALPITATIONS: 0
HEARTBURN: 1
EYE IRRITATION: 0
SINUS CONGESTION: 0
BLOOD IN STOOL: 0
ALTERED TEMPERATURE REGULATION: 1
FATIGUE: 0
DIZZINESS: 1
SORE THROAT: 0
LOSS OF CONSCIOUSNESS: 0
ARTHRALGIAS: 0
HYPERTENSION: 0
WEAKNESS: 0
PARALYSIS: 0
EYE PAIN: 0
ABDOMINAL PAIN: 1
WEIGHT GAIN: 0
NAUSEA: 1
MYALGIAS: 1
LIGHT-HEADEDNESS: 1
TREMORS: 0
EXERCISE INTOLERANCE: 0
WEIGHT LOSS: 1
SEIZURES: 0
BLOATING: 0

## 2018-05-14 ENCOUNTER — OFFICE VISIT (OUTPATIENT)
Dept: SURGERY | Facility: CLINIC | Age: 58
End: 2018-05-14
Attending: SURGERY
Payer: COMMERCIAL

## 2018-05-14 ENCOUNTER — ONCOLOGY VISIT (OUTPATIENT)
Dept: ONCOLOGY | Facility: CLINIC | Age: 58
End: 2018-05-14
Attending: INTERNAL MEDICINE
Payer: COMMERCIAL

## 2018-05-14 ENCOUNTER — CARE COORDINATION (OUTPATIENT)
Dept: ONCOLOGY | Facility: CLINIC | Age: 58
End: 2018-05-14

## 2018-05-14 ENCOUNTER — APPOINTMENT (OUTPATIENT)
Dept: LAB | Facility: CLINIC | Age: 58
End: 2018-05-14
Attending: INTERNAL MEDICINE
Payer: COMMERCIAL

## 2018-05-14 VITALS
SYSTOLIC BLOOD PRESSURE: 101 MMHG | HEART RATE: 65 BPM | DIASTOLIC BLOOD PRESSURE: 72 MMHG | RESPIRATION RATE: 16 BRPM | WEIGHT: 151.5 LBS | BODY MASS INDEX: 25.24 KG/M2 | OXYGEN SATURATION: 99 % | HEIGHT: 65 IN | TEMPERATURE: 98 F

## 2018-05-14 VITALS
OXYGEN SATURATION: 99 % | BODY MASS INDEX: 25.24 KG/M2 | DIASTOLIC BLOOD PRESSURE: 72 MMHG | HEART RATE: 65 BPM | SYSTOLIC BLOOD PRESSURE: 101 MMHG | TEMPERATURE: 98 F | HEIGHT: 65 IN | RESPIRATION RATE: 16 BRPM | WEIGHT: 151.5 LBS

## 2018-05-14 DIAGNOSIS — C25.9 PANCREATIC ADENOCARCINOMA (H): Primary | ICD-10-CM

## 2018-05-14 DIAGNOSIS — C25.9 PANCREATIC CANCER (H): ICD-10-CM

## 2018-05-14 LAB
ALBUMIN SERPL-MCNC: 3.6 G/DL (ref 3.4–5)
ALP SERPL-CCNC: 112 U/L (ref 40–150)
ALT SERPL W P-5'-P-CCNC: 20 U/L (ref 0–50)
ANION GAP SERPL CALCULATED.3IONS-SCNC: 6 MMOL/L (ref 3–14)
AST SERPL W P-5'-P-CCNC: 23 U/L (ref 0–45)
BASOPHILS # BLD AUTO: 0.1 10E9/L (ref 0–0.2)
BASOPHILS NFR BLD AUTO: 1.2 %
BILIRUB SERPL-MCNC: 0.4 MG/DL (ref 0.2–1.3)
BUN SERPL-MCNC: 16 MG/DL (ref 7–30)
CALCIUM SERPL-MCNC: 9.4 MG/DL (ref 8.5–10.1)
CHLORIDE SERPL-SCNC: 105 MMOL/L (ref 94–109)
CO2 SERPL-SCNC: 26 MMOL/L (ref 20–32)
CREAT SERPL-MCNC: 0.61 MG/DL (ref 0.52–1.04)
DIFFERENTIAL METHOD BLD: ABNORMAL
EOSINOPHIL # BLD AUTO: 0.6 10E9/L (ref 0–0.7)
EOSINOPHIL NFR BLD AUTO: 8.3 %
ERYTHROCYTE [DISTWIDTH] IN BLOOD BY AUTOMATED COUNT: 13.6 % (ref 10–15)
GFR SERPL CREATININE-BSD FRML MDRD: >90 ML/MIN/1.7M2
GLUCOSE SERPL-MCNC: 180 MG/DL (ref 70–99)
HCT VFR BLD AUTO: 36.8 % (ref 35–47)
HGB BLD-MCNC: 11.8 G/DL (ref 11.7–15.7)
IMM GRANULOCYTES # BLD: 0 10E9/L (ref 0–0.4)
IMM GRANULOCYTES NFR BLD: 0.3 %
LYMPHOCYTES # BLD AUTO: 2.8 10E9/L (ref 0.8–5.3)
LYMPHOCYTES NFR BLD AUTO: 40.1 %
MCH RBC QN AUTO: 31.2 PG (ref 26.5–33)
MCHC RBC AUTO-ENTMCNC: 32.1 G/DL (ref 31.5–36.5)
MCV RBC AUTO: 97 FL (ref 78–100)
MONOCYTES # BLD AUTO: 1.2 10E9/L (ref 0–1.3)
MONOCYTES NFR BLD AUTO: 17.3 %
NEUTROPHILS # BLD AUTO: 2.3 10E9/L (ref 1.6–8.3)
NEUTROPHILS NFR BLD AUTO: 32.8 %
NRBC # BLD AUTO: 0 10*3/UL
NRBC BLD AUTO-RTO: 0 /100
PLATELET # BLD AUTO: 411 10E9/L (ref 150–450)
POTASSIUM SERPL-SCNC: 3.9 MMOL/L (ref 3.4–5.3)
PROT SERPL-MCNC: 7.2 G/DL (ref 6.8–8.8)
RBC # BLD AUTO: 3.78 10E12/L (ref 3.8–5.2)
SODIUM SERPL-SCNC: 138 MMOL/L (ref 133–144)
WBC # BLD AUTO: 6.9 10E9/L (ref 4–11)

## 2018-05-14 PROCEDURE — 85025 COMPLETE CBC W/AUTO DIFF WBC: CPT | Performed by: INTERNAL MEDICINE

## 2018-05-14 PROCEDURE — G0463 HOSPITAL OUTPT CLINIC VISIT: HCPCS | Mod: ZF

## 2018-05-14 PROCEDURE — 99214 OFFICE O/P EST MOD 30 MIN: CPT | Mod: GC | Performed by: INTERNAL MEDICINE

## 2018-05-14 PROCEDURE — 80053 COMPREHEN METABOLIC PANEL: CPT | Performed by: INTERNAL MEDICINE

## 2018-05-14 PROCEDURE — 99024 POSTOP FOLLOW-UP VISIT: CPT | Mod: ZP | Performed by: SURGERY

## 2018-05-14 PROCEDURE — 36591 DRAW BLOOD OFF VENOUS DEVICE: CPT

## 2018-05-14 PROCEDURE — 25000128 H RX IP 250 OP 636: Mod: ZF | Performed by: INTERNAL MEDICINE

## 2018-05-14 RX ORDER — EPINEPHRINE 1 MG/ML
0.3 INJECTION, SOLUTION INTRAMUSCULAR; SUBCUTANEOUS EVERY 5 MIN PRN
Status: CANCELLED | OUTPATIENT
Start: 2018-06-05

## 2018-05-14 RX ORDER — SODIUM CHLORIDE 9 MG/ML
1000 INJECTION, SOLUTION INTRAVENOUS CONTINUOUS PRN
Status: CANCELLED
Start: 2018-06-12

## 2018-05-14 RX ORDER — ALBUTEROL SULFATE 90 UG/1
1-2 AEROSOL, METERED RESPIRATORY (INHALATION)
Status: CANCELLED
Start: 2018-06-05

## 2018-05-14 RX ORDER — DIPHENHYDRAMINE HYDROCHLORIDE 50 MG/ML
50 INJECTION INTRAMUSCULAR; INTRAVENOUS
Status: CANCELLED
Start: 2018-06-05

## 2018-05-14 RX ORDER — HEPARIN SODIUM (PORCINE) LOCK FLUSH IV SOLN 100 UNIT/ML 100 UNIT/ML
5 SOLUTION INTRAVENOUS
Status: COMPLETED | OUTPATIENT
Start: 2018-05-14 | End: 2018-05-14

## 2018-05-14 RX ORDER — EPINEPHRINE 0.3 MG/.3ML
0.3 INJECTION SUBCUTANEOUS EVERY 5 MIN PRN
Status: CANCELLED | OUTPATIENT
Start: 2018-06-12

## 2018-05-14 RX ORDER — MEPERIDINE HYDROCHLORIDE 25 MG/ML
25 INJECTION INTRAMUSCULAR; INTRAVENOUS; SUBCUTANEOUS EVERY 30 MIN PRN
Status: CANCELLED | OUTPATIENT
Start: 2018-06-12

## 2018-05-14 RX ORDER — EPINEPHRINE 1 MG/ML
0.3 INJECTION, SOLUTION INTRAMUSCULAR; SUBCUTANEOUS EVERY 5 MIN PRN
Status: CANCELLED | OUTPATIENT
Start: 2018-06-12

## 2018-05-14 RX ORDER — EPINEPHRINE 0.3 MG/.3ML
0.3 INJECTION SUBCUTANEOUS EVERY 5 MIN PRN
Status: CANCELLED | OUTPATIENT
Start: 2018-06-05

## 2018-05-14 RX ORDER — METHYLPREDNISOLONE SODIUM SUCCINATE 125 MG/2ML
125 INJECTION, POWDER, LYOPHILIZED, FOR SOLUTION INTRAMUSCULAR; INTRAVENOUS
Status: CANCELLED
Start: 2018-05-30

## 2018-05-14 RX ORDER — ALBUTEROL SULFATE 90 UG/1
1-2 AEROSOL, METERED RESPIRATORY (INHALATION)
Status: CANCELLED
Start: 2018-05-30

## 2018-05-14 RX ORDER — METHYLPREDNISOLONE SODIUM SUCCINATE 125 MG/2ML
125 INJECTION, POWDER, LYOPHILIZED, FOR SOLUTION INTRAMUSCULAR; INTRAVENOUS
Status: CANCELLED
Start: 2018-06-12

## 2018-05-14 RX ORDER — SODIUM CHLORIDE 9 MG/ML
1000 INJECTION, SOLUTION INTRAVENOUS CONTINUOUS PRN
Status: CANCELLED
Start: 2018-06-05

## 2018-05-14 RX ORDER — LORAZEPAM 2 MG/ML
0.5 INJECTION INTRAMUSCULAR EVERY 4 HOURS PRN
Status: CANCELLED
Start: 2018-06-05

## 2018-05-14 RX ORDER — SODIUM CHLORIDE 9 MG/ML
1000 INJECTION, SOLUTION INTRAVENOUS CONTINUOUS PRN
Status: CANCELLED
Start: 2018-05-30

## 2018-05-14 RX ORDER — ALBUTEROL SULFATE 90 UG/1
1-2 AEROSOL, METERED RESPIRATORY (INHALATION)
Status: CANCELLED
Start: 2018-06-12

## 2018-05-14 RX ORDER — ALBUTEROL SULFATE 0.83 MG/ML
2.5 SOLUTION RESPIRATORY (INHALATION)
Status: CANCELLED | OUTPATIENT
Start: 2018-05-30

## 2018-05-14 RX ORDER — EPINEPHRINE 0.3 MG/.3ML
0.3 INJECTION SUBCUTANEOUS EVERY 5 MIN PRN
Status: CANCELLED | OUTPATIENT
Start: 2018-05-30

## 2018-05-14 RX ORDER — ALBUTEROL SULFATE 0.83 MG/ML
2.5 SOLUTION RESPIRATORY (INHALATION)
Status: CANCELLED | OUTPATIENT
Start: 2018-06-05

## 2018-05-14 RX ORDER — LORAZEPAM 2 MG/ML
0.5 INJECTION INTRAMUSCULAR EVERY 4 HOURS PRN
Status: CANCELLED
Start: 2018-06-12

## 2018-05-14 RX ORDER — DIPHENHYDRAMINE HYDROCHLORIDE 50 MG/ML
50 INJECTION INTRAMUSCULAR; INTRAVENOUS
Status: CANCELLED
Start: 2018-06-12

## 2018-05-14 RX ORDER — DIPHENHYDRAMINE HYDROCHLORIDE 50 MG/ML
50 INJECTION INTRAMUSCULAR; INTRAVENOUS
Status: CANCELLED
Start: 2018-05-30

## 2018-05-14 RX ORDER — METHYLPREDNISOLONE SODIUM SUCCINATE 125 MG/2ML
125 INJECTION, POWDER, LYOPHILIZED, FOR SOLUTION INTRAMUSCULAR; INTRAVENOUS
Status: CANCELLED
Start: 2018-06-05

## 2018-05-14 RX ORDER — ALBUTEROL SULFATE 0.83 MG/ML
2.5 SOLUTION RESPIRATORY (INHALATION)
Status: CANCELLED | OUTPATIENT
Start: 2018-06-12

## 2018-05-14 RX ORDER — MEPERIDINE HYDROCHLORIDE 25 MG/ML
25 INJECTION INTRAMUSCULAR; INTRAVENOUS; SUBCUTANEOUS EVERY 30 MIN PRN
Status: CANCELLED | OUTPATIENT
Start: 2018-06-05

## 2018-05-14 RX ORDER — MEPERIDINE HYDROCHLORIDE 25 MG/ML
25 INJECTION INTRAMUSCULAR; INTRAVENOUS; SUBCUTANEOUS EVERY 30 MIN PRN
Status: CANCELLED | OUTPATIENT
Start: 2018-05-30

## 2018-05-14 RX ORDER — LORAZEPAM 2 MG/ML
0.5 INJECTION INTRAMUSCULAR EVERY 4 HOURS PRN
Status: CANCELLED
Start: 2018-05-30

## 2018-05-14 RX ORDER — EPINEPHRINE 1 MG/ML
0.3 INJECTION, SOLUTION INTRAMUSCULAR; SUBCUTANEOUS EVERY 5 MIN PRN
Status: CANCELLED | OUTPATIENT
Start: 2018-05-30

## 2018-05-14 RX ADMIN — SODIUM CHLORIDE, PRESERVATIVE FREE 5 ML: 5 INJECTION INTRAVENOUS at 07:22

## 2018-05-14 ASSESSMENT — PAIN SCALES - GENERAL
PAINLEVEL: NO PAIN (0)
PAINLEVEL: NO PAIN (0)

## 2018-05-14 NOTE — LETTER
5/14/2018       RE: Kitty Bangura  [address] [facility] [address]     Dear Colleague,    Thank you for referring your patient, Kitty Bangura, to the [facility]. Please see a copy of my visit note below.    Oncology Follow-up Visit:  May 14, 2018    Cancer diagnosis: cystic pancreatic tail adenocarcinoma  small subcentimeter pulmonary nodules seen on staging scans of unclear etiology.    Treatment to date: neoadjuvant FOLFIRINOX x4 cycles, 1/15/18-3/6/18  Difficulty tolerating neoadjuvant intent chemotherapy. Distal pancreatectomy performed April 17, 2018, no residual carcinoma seen on operative pathology.    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    Referring physician: Dr. González Metz MD     Oncology History: Kitty Bangura is a 57 year old woman, previously healthy until she presented in early November 2017 with abdominal pain. CT abdomen on 11/1/17 showed acute pancreatitis (lipase 41,960) and a 3cm heterogenous pancreatic tail mass. The etiology of pancreatitis is unclear - no obstructing gallstone and not a heavy drinker. She was hospitalized for one week and followed up with Dr. González Metz in GI clinic.     11/29/17 -- endoscopic drainage of walled-off area of pancreatic necrosis by Dr. Metz. During the same procedure she had an EUS FNA of the pancreatic tail mass and pathology showed adenocarcinoma. The mass measured 33 x 27 mm, encapsulated with solid and cystic components, rim calcification, and no evidence of invasion.     12/9/17 -- Staging CT chest/abd/pelvis showed several small pulmonary nodules (largest 3mm), unchanged mass in the pancreatic tail, mildly prominent mesenteric nodes thought likely reactive, and small right hepatic lobe hypodensities. Abdominal MRI on 12/10/17  showed hepatic hemangiomas, no evidence of metastatic disease to the liver.     1/8/18 - - oncology consultation, Dr. Monk. Recommendation: neoadjuvant intent chemotherapy with FOLFIRINOX.    1/15/18 - - cycle 1/day one FOLFIRINOX chemotherapy.    1/20 through 1/27/18 - - hospitalization at the [facility], for acute pancreatitis. Following three days of nausea, vomiting, pain. During this hospitalization: ERCP was attempted by Dr. Sanches with pancreatic stent placement, but pancreatic duct was completely obstructed and wire was unable to pass beyond the duct. Dr. Metz performed successful US guided cyst gastrostomy January 25, 2018.    1/31/18 - - oncology follow-up, DANTE Talavera. Neutropenic with ANC 0.4, thus defer cycle two of chemotherapy.    2/5/18 - - oncology follow-up, DANTE Junior.  Cycle 2/day one FOLFIRINOX chemotherapy. Patient endorses cold sensitivity secondary to oxaliplatin. Neulasta given for growth factor support. Due to significant neutropenia with cycle one.    2/11/18 - - ER evaluation for epigastric pain. Discharged to home from ER. Leukocytosis secondary to Neulasta given on February 8.    2/20/18 - - oncology follow-up, DANTE Junior. Cycle 3/day one FOLFIRINOX given without Neulasta. At patient request, oxaliplatin dose reduced by 10% due to neuropathy/cold sensitivity.    3/6/18 - - oncology follow-up, DANTE Talavera. Cycle 4/day one FOLFIRINOX chemotherapy.    3/13/18 - - CT chest/abdomen/pelvis - - IMPRESSION: 1. Minimal decrease in size and marked decrease in enhancement and pancreatic mass since the comparison study. 2. No significant change in low dense lesions in the liver since comparison.    3/16/18 - - oncology follow-up Dr. Monk. Plan is to hold on further chemotherapy as surgery is scheduled for 4/17/18 4/17/18 - - surgery: PROCEDURE: Diagnostic laparoscopy, splenic flexure mobilization, intraoperative ultrasound, distal  pancreatectomy, splenectomy, and cholecystectomy. SURGEON: Ja Byrd MD  Final surgical pathology showed necrotizing pancreatitis, no evidence of residual cancer, complete pathologic response, 26 benign lymph nodes.  FINAL DIAGNOSIS: A. DISTAL PANCREAS, SPLEEN AND OMENTUM, RESECTION: - Necrotizing pancreatitis with residual acellular mucin and foci of high grade dysplasia, status post neoadjuvant therapy   - Treatment response: complete (score 0)   - Twenty-six benign lymph nodes (0/26)   - Negative for residual carcinoma   - Pathologic staging: [id number]   - Focal infarction, metaplastic bone formation - Surgical margins are free of neoplasia - Unremarkable spleen and omentum   B. GALLBLADDER, CHOLECYSTECTOMY: - Benign gallbladder, biliary mucosa with no significant histologic abnormality - Negative for dysplasia COMMENT: Histologic sections demonstrate pools of acellular mucin, necrosis and scattered high grade dysplasia (PanIN-3) with no evidence of residual invasive carcinoma    18 - - surgical oncology follow-up, Dr. Byrd.  18 - - palliative care follow-up Dr. Snow. No specific recommendations required at this time.  18 - - oncology follow-up, Dr. Monk.    Interim History:  Kitty Bangura is a 57 year old year old woman here with her  for follow-up of pancreatic adenocarcinoma. She is about 4 weeks out from distal pancreatectomy and splenectomy. She has recovered from surgery quite well. She is walking one mile at least once a day, sometimes 3x per day. No fevers, n/v, or d/c. Using the Creon. Weight is stable. Intermittent abdominal pain does not require medication - improves with activity.       Review Of Systems:  Full 10-point ROS reviewed. Pertinent symptoms reviewed above per HPI.    PAST MEDICAL HISTORY:   Pre-diabetes  Pancreatitis as above     PAST SURGICAL HISTORY:  Laparotomy x2 for ruptured tubal pregnancies    Discectomy for herniated lumbar disk  Breast  "reduction surgery     FAMILY HISTORY:  Colon cancer in maternal aunt  Paternal aunt and cousins with breast cancer  CAD in father and brother     SH: , from [place], with 29 yr-old son. Works as . former smoker, quit 30 years ago. two glasses of wine per night, none since pancreatitis diagnosis     Allergies: none      Current Outpatient Prescriptions:      acetaminophen (TYLENOL) 500 MG tablet, Take 2 tablets (1,000 mg) by mouth every 8 hours, Disp: 100 tablet, Rfl: 1     amylase-lipase-protease (CREON) 06533-40715 UNITS CPEP per EC capsule, Take 2-3 with meals / 1-2 with snacks, up to 15 per day., Disp: 450 capsule, Rfl: 6     enoxaparin (LOVENOX) 40 MG/0.4ML injection, Inject 0.4 mLs (40 mg) Subcutaneous daily for 23 days, Disp: 9.2 mL, Rfl: 0     LORazepam (ATIVAN) 0.5 MG tablet, Take 1 tablet (0.5 mg) by mouth every 4 hours as needed (Anxiety, Nausea/Vomiting or Sleep), Disp: 30 tablet, Rfl: 2     ondansetron (ZOFRAN) 8 MG tablet, Take 1 tablet (8 mg) by mouth every 8 hours as needed for nausea, Disp: 30 tablet, Rfl: 0     pantoprazole (PROTONIX) 40 MG EC tablet, Take 1 tablet (40 mg) by mouth every morning, Disp: 30 tablet, Rfl: 0     polyethylene glycol (MIRALAX/GLYCOLAX) Packet, Take 17 g by mouth daily, Disp: 30 packet, Rfl: 0     prochlorperazine (COMPAZINE) 10 MG tablet, Take 1 tablet (10 mg) by mouth every 6 hours as needed (Nausea/Vomiting), Disp: 30 tablet, Rfl: 2  No current facility-administered medications for this visit.     Facility-Administered Medications Ordered in Other Visits:      heparin 100 UNIT/ML injection 5 mL, 5 mL, Intracatheter, Once, Art Guevara MD      Physical Exam:  /72 (BP Location: Right arm, Patient Position: Sitting, Cuff Size: Adult Regular)  Pulse 65  Temp 98  F (36.7  C) (Oral)  Resp 16  Ht 1.651 m (5' 5\")  Wt 68.7 kg (151 lb 8 oz)  SpO2 99%  BMI 25.21 kg/m2  General: NAD, alert and interactive  HEENT: NEERAJ DIAZ, developing " cold sore on right lower lip   Lungs: CTA bilaterally  Cardiovascular: RRR, no M/R/G, no edema  Abdominal/Rectal: +BS, soft, NT, ND. Surgical wound is well healed and without erythema or fluctuance   Lymph: no cervical, supraclavicular, axillary, or inguinal adenopathy  MSK: normal muscle tone  Skin: no rashes or petechaie  Neuro: A&O       Laboratory/Imaging Studies  CBC RESULTS:   Recent Labs   Lab Test  05/14/18   0728   WBC  6.9   RBC  3.78*   HGB  11.8   HCT  36.8   MCV  97   MCH  31.2   MCHC  32.1   RDW  13.6   PLT  411     Recent Labs   Lab Test  05/14/18   0728  04/30/18   1143   NA  138  139   POTASSIUM  3.9  4.0   CHLORIDE  105  105   CO2  26  28   ANIONGAP  6  6   GLC  180*  185*   BUN  16  13   CR  0.61  0.61   ILIANA  9.4  9.4     Liver Function Studies -   Recent Labs   Lab Test  05/14/18   0728   PROTTOTAL  7.2   ALBUMIN  3.6   BILITOTAL  0.4   ALKPHOS  112   AST  23   ALT  20       Results for KITTY CHAVIS (MRN [id number]) as of 3/17/2018 16:51   Ref. Range 1/15/2018 08:40 3/13/2018 09:20   Cancer Antigen 19-9 Latest Ref Range: 0 - 37 U/mL 12 10     RADIOLOGY: most recent scan on 4/2/18 was before surgery      ASSESSMENT/PLAN:  Pancreatic tail adenocarcinoma: Kitty was diagnosed in December 2017 after presenting with pancreatitis. She received 4 cycles of neoadjuvant intent FOLFIRINOX from Jan-March 2018. She had another bout of pancreatitis after the first cycle and neutropenia that delayed cycle 2. Oxaliplatin was reduced to 90% target dose with cycle 3 due to neuropathy.     She had stable disease on imaging after 4 cycles of FOLFIRINOX and proceeded to surgery on 4/17/18, about 4 weeks ago. The pathology report is very favorable, with no evidence of residual disease (complete pathologic response), negative margins, and 26 negative lymph nodes. We are very pleased with these results.     Now that she has had successful neoadjuvant chemotherapy and surgical resection, the goal is to decrease the  risk of recurrence. The current standard of care is adjuvant chemotherapy with gemcitabine/Xeloda per the ESPAC-4 study. We would plan to complete a total of 6 months of neoadjuvant plus adjuvant chemo. Since she had 2 months of neoadjuvant chemo, we will plan for 4 months of adjuvant therapy with gem/Xeloda. The schedule is gemcitabine on days 1, 8, 15, and Xeloda on days 1-21, of a 28-day cycle.     We discussed the potential side effects of gem/Xeloda including low blood counts, infection, bleeding, n/v/d, mucositis, rash, hand foot syndrome, fatigue, and alopecia. She agreed to proceed with this regimen. She would prefer to wait until after the memorial day weekend, so will plan to start the week of May 28. We will get new baseline scan before starting.   She is scheduled to see Dr. Byrd with surgical oncology today as well.     - CT chest/abd/pelvis and Ca 19-9 the week of May 21  - Schedule 4 cycles of gemcitabine/Xeloda, starting Tuesday May 29 if possible  - F/u with Kellie Nobles on day 1/cycle 1 of chemo and with day 1/cycle 2  - RTC with Dr. Monk on day 1/cycle 3    Patient seen and discussed with staff Dr. Aleshia Ponce MD  Heme/Onc Fellow  Pager: [phone]    MEDICAL ONCOLOGY ATTENDING PHYSICIAN ADDENDUM:  I have seen and evaluated this patient with the medical oncology fellow. I have reviewed and edited this fellow's note, and agree with the assessment and plan stated above. A complete review of systems was assessed, and pertinent positives/negatives are discussed in detail above and as follows.    Ms. Bangura returns to the clinic today with her .  She is approximately 4 weeks out from her distal pancreatectomy.  Dr. Byrd had personally conferred with me verbally about the remarkable fact that there was no residual carcinoma seen on histopathologic examination.  There were 26 lymph nodes that were resected and confirmed as benign.  There was evidence of necrotizing pancreatitis which  the patient had clinically.  There is also some good treatment response to a limited number of cycles of neoadjuvant FOLFIRINOX, a total of 4 cycles between mid January to mid March.      She has been walking 1 mile a day.  She has been working with a nutritionist team and taking Creon capsules for enzyme supplementation, 2-3 capsules per meal.  She also met with Dr. Snow from our Palliative Care team and does not have any current needs at this time.      Physical exam is unremarkable.  Surgical scar is well healed.  No overlying fluctuance, erythema or tenderness.  She has no palpable mass in the abdomen area.  There is no pain elicited upon deep palpation.  In the range of HEENT, cardiovascular and lower extremity exam is unremarkable.      My impression is that she has had a phenomenal response to neoadjuvant-intent chemotherapy with no residual carcinoma seen status post distal pancreatectomy.  Reviewed the standard of care encompassing 6 months total of systemic chemotherapy.  Thus 4 months would be remaining based on the ESPAC-4 trial discussed last at the ASCO meeting in 2017.  I recommend gemcitabine 1000 mg/m2 IV days 1, 8 and 15 of each 28-day cycle along with Xeloda b.i.d. on 21 days on, 7 days off routine schedule.  I carefully reviewed the potential risk and side effects up to and including but not limited to chemotherapy-related fatigue, neutropenia and subsequent risk of infection, risk of anemia, thrombocytopenia and subsequent risk of bleeding, alopecia, nausea and vomiting, potential worsening of epigastric discomfort secondary to the chemotherapy, hand-foot syndrome and diarrhea, particularly from the capecitabine and further morbidity.  The patient stated understanding of all the above.  Jesica Hewitt RN, my care coordinator who was present throughout the entirety of today's visit, provided detailed chemotherapy teaching, verbally as well as with printed information.  The patient prefers to  start after Memorial Day which would be approximately 6 weeks out from surgery, and I think that would be okay.  We will go as scheduled with an [facility] provider and tentative scheduling of cycle 1, day 1 of therapy at that time.  Further followup will be made once she has chemotherapy.           I spent 30 minutes in consultation, including H&P and Discussion. >50% of time was spent in counseling and in coordination of care.    Jovany Monk MD, PhD

## 2018-05-14 NOTE — MR AVS SNAPSHOT
After Visit Summary   5/14/2018    Kitty Bangura    MRN: [id number]           Patient Information     Date Of Birth          1960        Visit Information        Provider Department      5/14/2018 9:00 AM Ja Byrd MD [facility]        Today's Diagnoses     Pancreatic adenocarcinoma (H)    -  1       Follow-ups after your visit        Your next 10 appointments already scheduled     May 22, 2018  9:15 AM CDT   LAB with [facility] ([facility])    [address] [address]   [phone]           Please do not eat 10-12 hours before your appointment if you are coming in fasting for labs on lipids, cholesterol, or glucose (sugar). This does not apply to pregnant women. Water, hot tea and black coffee (with nothing added) are okay. Do not drink other fluids, diet soda or chew gum.            May 22, 2018  9:45 AM CDT   CT CHEST/ABDOMEN/PELVIS W CONTRAST with [facility]   [facility] CT ([facility])    [address] [address]   [phone]           Please bring any scans or X-rays taken at other hospitals, if similar tests were done. Also bring a list of your medicines, including vitamins, minerals and over-the-counter drugs. It is safest to leave personal items at home.  Be sure to tell your doctor:   If you have any allergies.   If there s any chance you are pregnant.   If you are breastfeeding.  How to prepare:   Do not eat or drink for 2 hours before your exam. If you need to take medicine, you may take it with small sips of water. (We may ask you to take liquid medicine as well.)   Please wear loose clothing, such as a sweat suit or jogging clothes. Avoid snaps, zippers and other metal. We may ask you to undress and put on a hospital gown.  Please arrive 30 minutes early for your CT. Once in the department you might be asked to drink water 15-20 minutes prior to your exam.   If indicated you may be asked to drink an oral contrast in advance of your CT.  If this is the case, the imaging team will let you know or be in contact with you prior to your appointment  Patients over 70 or patients with diabetes or kidney problems:   If you haven t had a blood test (creatinine test) within the last 30 days, the Cardiologist/Radiologist may require you to get this test prior to your exam.  If you have diabetes:   Continue to take your metformin medication on the day of your exam  If you have any questions, please call the Imaging Department where you will have your exam.            May 30, 2018  6:30 AM CDT   Masonic Lab Draw with [facility] MASONIC LAB DRAW   [facility] Masonic Lab Draw ([facility])    [address]  [address]  [place] [place] [address]   [phone]            May 30, 2018  7:00 AM CDT   Infusion 360 with UC ONCOLOGY INFUSION, UC 10 ATC   [facility] ([facility])    [address]  [address]  [place] [address]   [phone]            Jun 05, 2018  6:30 AM CDT   M[facility]ic Lab Draw with [facility]ON[facility] LAB DRAW   [facility] Lab Draw ([facility])    [address]  [address]  [place] [address]   [phone]            Jun 05, 2018  7:00 AM CDT   (Arrive by 6:45 AM)   Return Visit with Kellie Nobles PA-C   [facility] ([facility])    [address]  [address]  [place] [address]   [phone]            Jun 05, 2018  8:30 AM CDT   Infusion 360 with UC ONCOLOGY INFUSION, UC 20 ATC   [facility] ([facility])    [address]  [address]  [place] [address]   [phone]            Jun 12, 2018  8:00 AM CDT   M[facility]ic Lab Draw with UC MASONIC LAB DRAW   [facility] Lab Draw ([facility])    [address] [place] [address]  "[address]  [address]  [facility] [id number] [phone]              Future tests that were ordered for you today     Open Future Orders        Priority Expected Expires Ordered    CT Chest/Abdomen/Pelvis w Contrast Routine 5/24/2018 5/14/2019 5/14/2018    Cancer antigen 19-9 Routine 5/24/2018 5/14/2019 5/14/2018            Who to contact     If you have questions or need follow up information about today's clinic visit or your schedule please contact [facility] directly at [phone].  Normal or non-critical lab and imaging results will be communicated to you by [employer], letter or phone within 4 business days after the clinic has received the results. If you do not hear from us within 7 days, please contact the clinic through [employer] or phone. If you have a critical or abnormal lab result, we will notify you by phone as soon as possible.  Submit refill requests through [employer] or call your pharmacy and they will forward the refill request to us. Please allow 3 business days for your refill to be completed.          Additional Information About Your Visit        [employer] Information     [employer] gives you secure access to your electronic health record. If you see a primary care provider, you can also send messages to your care team and make appointments. If you have questions, please call your primary care clinic.  If you do not have a primary care provider, please call [phone] and they will assist you.        Care EveryWhere ID     This is your Care EveryWhere ID. This could be used by other organizations to access your [facility] medical records  [id number]        Your Vitals Were     Pulse Temperature Respirations Height Pulse Oximetry BMI (Body Mass Index)    65 98  F (36.7  C) (Oral) 16 1.651 m (5' 5\") 99% 25.21 kg/m2       Blood Pressure from Last 3 Encounters:   05/14/18 101/72   05/14/18 101/72   05/08/18 (!) 87/58    Weight from Last 3 Encounters:   05/14/18 68.7 kg (151 lb 8 oz) "   05/14/18 68.7 kg (151 lb 8 oz)   05/08/18 68 kg (150 lb)              Today, you had the following     No orders found for display         Today's Medication Changes          These changes are accurate as of 5/14/18 10:56 AM.  If you have any questions, ask your nurse or doctor.               Stop taking these medicines if you haven't already. Please contact your care team if you have questions.     LORazepam 0.5 MG tablet   Commonly known as:  ATIVAN   Stopped by:  Jovany Monk MD           prochlorperazine 10 MG tablet   Commonly known as:  COMPAZINE   Stopped by:  Jovany Monk MD                    Primary Care Provider Office Phone # Fax #    Josenargis Julito Mcgill [phone] [address] [address]        Equal Access to Services     [facility]: Tre donovan Sopaulo, waaxda luqadaha, qaybta kaalmada marty, re gerardo . So [facility] [phone].    ATENCIÓN: Si habla español, tiene a quiros disposición servicios gratuitos de asistencia lingüística. [facility] [phone].    We comply with applicable federal civil rights laws and [place] laws. We do not discriminate on the basis of race, color, national origin, age, disability, sex, sexual orientation, or gender identity.            Thank you!     Thank you for choosing [facility]  for your care. Our goal is always to provide you with excellent care. Hearing back from our patients is one way we can continue to improve our services. Please take a few minutes to complete the written survey that you may receive in the mail after your visit with us. Thank you!             Your Updated Medication List - Protect others around you: Learn how to safely use, store and throw away your medicines at www.disposemymeds.org.          This list is accurate as of 5/14/18 10:56 AM.  Always use your most recent med list.                   Brand Name Dispense Instructions for use  Diagnosis    acetaminophen 500 MG tablet    TYLENOL    100 tablet    Take 2 tablets (1,000 mg) by mouth every 8 hours    Acute post-operative pain       amylase-lipase-protease 41914-11930 units Cpep per EC capsule    CREON    450 capsule    Take 2-3 with meals / 1-2 with snacks, up to 15 per day.    Necrotizing pancreatitis       enoxaparin 40 MG/0.4ML injection    LOVENOX    9.2 mL    Inject 0.4 mLs (40 mg) Subcutaneous daily for 23 days    Pancreatic adenocarcinoma (H)       ondansetron 8 MG tablet    ZOFRAN    30 tablet    Take 1 tablet (8 mg) by mouth every 8 hours as needed for nausea    Pancreatic adenocarcinoma (H)       pantoprazole 40 MG EC tablet    PROTONIX    30 tablet    Take 1 tablet (40 mg) by mouth every morning    Pancreatic adenocarcinoma (H)       polyethylene glycol Packet    MIRALAX/GLYCOLAX    30 packet    Take 17 g by mouth daily    Drug-induced constipation

## 2018-05-14 NOTE — PROGRESS NOTES
The patient was provided ChemoCare handouts on Gemcitabine and Xeloda.  The patient has received a New Patient Chemotherapy Folder in the past.   Possible side effects were reviewed including neutropenia, fatigue, hand and foot syndrome, nausea, vomiting and diarrhea.  The patient was encouraged to call the nurse line for T > 100.4, redness, tenderness or blisters on the hands or feet, uncontrolled nausea, vomiting or diarrhea.  The patient was given the recommendation to use a urea based lotion on her hands and feet 2 times daily, and avoid pressure to the hands and feet.

## 2018-05-14 NOTE — PROGRESS NOTES
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      INTERVAL HISTORY:  She is now about 4 weeks postop from distal pancreatectomy and splenectomy in the setting of previous necrotizing pancreatitis. Recently, had her endoscopic stent/strain removed by Dr. Metz. She reports she is been doing well, maintaining away, tolerating a diet, and becoming more active. She has no complaints today.    She was also seen by Dr. Maurizio Monk today, and plans up and made to initiate her on adjuvant chemotherapy after Memorial Day.    Past Medical History and Past Surgical History remain unchanged from her initial consultation, except as outlined above.    /72 (BP Location: Right arm)  Pulse 65  Temp 98  F (36.7  C) (Oral)  Resp 16  Ht 1.651 m (5' 5\")  Wt 68.7 kg (151 lb 8 oz)  SpO2 99%  BMI 25.21 kg/m2   Physical Exam  General: No acute distress, healthy-appearing.  Head: Anicteric sclera.  Neck: No cervical or supraclavicular adenopathy.  Pulmonary: Bilateral chest rise.  Cardiac: Regular rate.  Abdomen: Abdominal incision is healing well. Soft, nontender, nondistended.  Extremities: No lower extremity edema.    INVESTIGATIONS:  Labs: Albumin 3.6, total bilirubin 0.4, hemoglobin 11.8.    CT: None.    Biopsy: Her pathology was reviewed with her postoperative visit, which essentially showed pathologic complete response.    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    She has recovered quite well from her surgery think she is ready to start adjuvant chemotherapy. Additionally, she has had her endoscopic substance already removed that were required for her " necrotizing pancreatitis.    Total time spent with the patient was 30 minutes, 10 minutes were spent providing postoperative care and additional 20 minutes were used for patient counseling and treatment planning..     Ja Byrd MD    Division of Surgical Oncology  [facility]

## 2018-05-14 NOTE — NURSING NOTE
"Chief Complaint   Patient presents with     Port Draw     port accessed and labs drawn by rn.  vs taken.     Port accessed with 20g 3/4\" gripper needle, labs drawn, port flushed with saline and heparin, vitals checked, pt checked in for next appointment.  Abena Kunz RN    "

## 2018-05-14 NOTE — LETTER
"5/14/2018       RE: Kitty Bangura  [address] [facility] [address]     Dear Colleague,    Thank you for referring your patient, Kitty Bangura, to the [facility]. Please see a copy of my visit note below.    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      INTERVAL HISTORY:  She is now about 4 weeks postop from distal pancreatectomy and splenectomy in the setting of previous necrotizing pancreatitis. Recently, had her endoscopic stent/strain removed by Dr. Metz. She reports she is been doing well, maintaining away, tolerating a diet, and becoming more active. She has no complaints today.    She was also seen by Dr. Maurizio Monk today, and plans up and made to initiate her on adjuvant chemotherapy after Memorial Day.    Past Medical History and Past Surgical History remain unchanged from her initial consultation, except as outlined above.    /72 (BP Location: Right arm)  Pulse 65  Temp 98  F (36.7  C) (Oral)  Resp 16  Ht 1.651 m (5' 5\")  Wt 68.7 kg (151 lb 8 oz)  SpO2 99%  BMI 25.21 kg/m2   Physical Exam  General: No acute distress, healthy-appearing.  Head: Anicteric sclera.  Neck: No cervical or supraclavicular adenopathy.  Pulmonary: Bilateral chest rise.  Cardiac: Regular rate.  Abdomen: Abdominal incision is healing well. Soft, nontender, nondistended.  Extremities: No lower extremity edema.    INVESTIGATIONS:  Labs: Albumin 3.6, total bilirubin 0.4, hemoglobin 11.8.    CT: None.    Biopsy: Her pathology was reviewed with her postoperative visit, which essentially showed pathologic complete response.    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    She has recovered quite well from her surgery think she is ready to start adjuvant chemotherapy. Additionally, she has had her endoscopic substance already removed that were required for her necrotizing pancreatitis.    Total time spent with the patient was 30 minutes, 10 minutes were spent providing postoperative care and additional 20 minutes were used for patient counseling and treatment planning..     Ja Byrd MD    Division of Surgical Oncology  [facility]

## 2018-05-14 NOTE — NURSING NOTE
"Oncology Rooming Note    May 14, 2018 8:01 AM   Kitty Bangura is a 57 year old female who presents for:    Chief Complaint   Patient presents with     Port Draw     port accessed and labs drawn by rn.  vs taken.     Oncology Clinic Visit     Return: Pancreatic CA     Initial Vitals: /72 (BP Location: Right arm, Patient Position: Sitting, Cuff Size: Adult Regular)  Pulse 65  Temp 98  F (36.7  C) (Oral)  Resp 16  Ht 1.651 m (5' 5\")  Wt 68.7 kg (151 lb 8 oz)  SpO2 99%  BMI 25.21 kg/m2 Estimated body mass index is 25.21 kg/(m^2) as calculated from the following:    Height as of this encounter: 1.651 m (5' 5\").    Weight as of this encounter: 68.7 kg (151 lb 8 oz). Body surface area is 1.78 meters squared.  No Pain (0) Comment: Data Unavailable   No LMP recorded. Patient is postmenopausal.  Allergies reviewed: Yes  Medications reviewed: Yes    Medications: Medication refills not needed today.  Pharmacy name entered into [facility]:    [id number] - [place], [place] - [address] [place] [address] [id number] - [place], [place] - [address] AT [address]    Clinical concerns: No new concerns Dr. Monk was NOT notified.    10 minutes for nursing intake (face to face time)     Israel Camejo CMA              "

## 2018-05-14 NOTE — PROGRESS NOTES
Oncology Follow-up Visit:  May 14, 2018    Cancer diagnosis: cystic pancreatic tail adenocarcinoma  small subcentimeter pulmonary nodules seen on staging scans of unclear etiology.    Treatment to date: neoadjuvant FOLFIRINOX x4 cycles, 1/15/18-3/6/18  Difficulty tolerating neoadjuvant intent chemotherapy. Distal pancreatectomy performed April 17, 2018, no residual carcinoma seen on operative pathology.    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    Referring physician: Dr. González Metz MD     Oncology History: Kitty Bangura is a 57 year old woman, previously healthy until she presented in early November 2017 with abdominal pain. CT abdomen on 11/1/17 showed acute pancreatitis (lipase 41,960) and a 3cm heterogenous pancreatic tail mass. The etiology of pancreatitis is unclear - no obstructing gallstone and not a heavy drinker. She was hospitalized for one week and followed up with Dr. González Metz in GI clinic.     11/29/17 -- endoscopic drainage of walled-off area of pancreatic necrosis by Dr. Metz. During the same procedure she had an EUS FNA of the pancreatic tail mass and pathology showed adenocarcinoma. The mass measured 33 x 27 mm, encapsulated with solid and cystic components, rim calcification, and no evidence of invasion.     12/9/17 -- Staging CT chest/abd/pelvis showed several small pulmonary nodules (largest 3mm), unchanged mass in the pancreatic tail, mildly prominent mesenteric nodes thought likely reactive, and small right hepatic lobe hypodensities. Abdominal MRI on 12/10/17 showed hepatic hemangiomas, no evidence of metastatic disease to the liver.     1/8/18 - - oncology consultation, Dr. Monk. Recommendation: neoadjuvant intent chemotherapy with FOLFIRINOX.    1/15/18 - - cycle 1/day one FOLFIRINOX  chemotherapy.    1/20 through 1/27/18 - - hospitalization at the [facility], for acute pancreatitis. Following three days of nausea, vomiting, pain. During this hospitalization: ERCP was attempted by Dr. Sanches with pancreatic stent placement, but pancreatic duct was completely obstructed and wire was unable to pass beyond the duct. Dr. Metz performed successful US guided cyst gastrostomy January 25, 2018.    1/31/18 - - oncology follow-up, DANTE Talavera. Neutropenic with ANC 0.4, thus defer cycle two of chemotherapy.    2/5/18 - - oncology follow-up, DANTE Junior.  Cycle 2/day one FOLFIRINOX chemotherapy. Patient endorses cold sensitivity secondary to oxaliplatin. Neulasta given for growth factor support. Due to significant neutropenia with cycle one.    2/11/18 - - ER evaluation for epigastric pain. Discharged to home from ER. Leukocytosis secondary to Neulasta given on February 8.    2/20/18 - - oncology follow-up, DANTE Junior. Cycle 3/day one FOLFIRINOX given without Neulasta. At patient request, oxaliplatin dose reduced by 10% due to neuropathy/cold sensitivity.    3/6/18 - - oncology follow-up, DANTE Talavera. Cycle 4/day one FOLFIRINOX chemotherapy.    3/13/18 - - CT chest/abdomen/pelvis - - IMPRESSION: 1. Minimal decrease in size and marked decrease in enhancement and pancreatic mass since the comparison study. 2. No significant change in low dense lesions in the liver since comparison.    3/16/18 - - oncology follow-up Dr. Monk. Plan is to hold on further chemotherapy as surgery is scheduled for 4/17/18 4/17/18 - - surgery: PROCEDURE: Diagnostic laparoscopy, splenic flexure mobilization, intraoperative ultrasound, distal pancreatectomy, splenectomy, and cholecystectomy. SURGEON: Ja Byrd MD  Final surgical pathology showed necrotizing pancreatitis, no evidence of residual cancer, complete pathologic response, 26 benign lymph nodes.  FINAL  DIAGNOSIS: A. DISTAL PANCREAS, SPLEEN AND OMENTUM, RESECTION: - Necrotizing pancreatitis with residual acellular mucin and foci of high grade dysplasia, status post neoadjuvant therapy   - Treatment response: complete (score 0)   - Twenty-six benign lymph nodes (0/26)   - Negative for residual carcinoma   - Pathologic staging: [id number]   - Focal infarction, metaplastic bone formation - Surgical margins are free of neoplasia - Unremarkable spleen and omentum   B. GALLBLADDER, CHOLECYSTECTOMY: - Benign gallbladder, biliary mucosa with no significant histologic abnormality - Negative for dysplasia COMMENT: Histologic sections demonstrate pools of acellular mucin, necrosis and scattered high grade dysplasia (PanIN-3) with no evidence of residual invasive carcinoma    18 - - surgical oncology follow-up, Dr. Byrd.  18 - - palliative care follow-up Dr. Snow. No specific recommendations required at this time.  18 - - oncology follow-up, Dr. Monk.    Interim History:  Kitty Bangura is a 57 year old year old woman here with her  for follow-up of pancreatic adenocarcinoma. She is about 4 weeks out from distal pancreatectomy and splenectomy. She has recovered from surgery quite well. She is walking one mile at least once a day, sometimes 3x per day. No fevers, n/v, or d/c. Using the Creon. Weight is stable. Intermittent abdominal pain does not require medication - improves with activity.       Review Of Systems:  Full 10-point ROS reviewed. Pertinent symptoms reviewed above per HPI.    PAST MEDICAL HISTORY:   Pre-diabetes  Pancreatitis as above     PAST SURGICAL HISTORY:  Laparotomy x2 for ruptured tubal pregnancies    Discectomy for herniated lumbar disk  Breast reduction surgery     FAMILY HISTORY:  Colon cancer in maternal aunt  Paternal aunt and cousins with breast cancer  CAD in father and brother     SH: , from [place], with 29 yr-old son. Works as . former smoker,  "quit 30 years ago. two glasses of wine per night, none since pancreatitis diagnosis     Allergies: none      Current Outpatient Prescriptions:      acetaminophen (TYLENOL) 500 MG tablet, Take 2 tablets (1,000 mg) by mouth every 8 hours, Disp: 100 tablet, Rfl: 1     amylase-lipase-protease (CREON) 63100-30604 UNITS CPEP per EC capsule, Take 2-3 with meals / 1-2 with snacks, up to 15 per day., Disp: 450 capsule, Rfl: 6     enoxaparin (LOVENOX) 40 MG/0.4ML injection, Inject 0.4 mLs (40 mg) Subcutaneous daily for 23 days, Disp: 9.2 mL, Rfl: 0     LORazepam (ATIVAN) 0.5 MG tablet, Take 1 tablet (0.5 mg) by mouth every 4 hours as needed (Anxiety, Nausea/Vomiting or Sleep), Disp: 30 tablet, Rfl: 2     ondansetron (ZOFRAN) 8 MG tablet, Take 1 tablet (8 mg) by mouth every 8 hours as needed for nausea, Disp: 30 tablet, Rfl: 0     pantoprazole (PROTONIX) 40 MG EC tablet, Take 1 tablet (40 mg) by mouth every morning, Disp: 30 tablet, Rfl: 0     polyethylene glycol (MIRALAX/GLYCOLAX) Packet, Take 17 g by mouth daily, Disp: 30 packet, Rfl: 0     prochlorperazine (COMPAZINE) 10 MG tablet, Take 1 tablet (10 mg) by mouth every 6 hours as needed (Nausea/Vomiting), Disp: 30 tablet, Rfl: 2  No current facility-administered medications for this visit.     Facility-Administered Medications Ordered in Other Visits:      heparin 100 UNIT/ML injection 5 mL, 5 mL, Intracatheter, Once, Art Guevara MD      Physical Exam:  /72 (BP Location: Right arm, Patient Position: Sitting, Cuff Size: Adult Regular)  Pulse 65  Temp 98  F (36.7  C) (Oral)  Resp 16  Ht 1.651 m (5' 5\")  Wt 68.7 kg (151 lb 8 oz)  SpO2 99%  BMI 25.21 kg/m2  General: NAD, alert and interactive  HEENT: PERRL, MMM, developing cold sore on right lower lip   Lungs: CTA bilaterally  Cardiovascular: RRR, no M/R/G, no edema  Abdominal/Rectal: +BS, soft, NT, ND. Surgical wound is well healed and without erythema or fluctuance   Lymph: no cervical, " supraclavicular, axillary, or inguinal adenopathy  MSK: normal muscle tone  Skin: no rashes or petechaie  Neuro: A&O       Laboratory/Imaging Studies  CBC RESULTS:   Recent Labs   Lab Test  05/14/18   0728   WBC  6.9   RBC  3.78*   HGB  11.8   HCT  36.8   MCV  97   MCH  31.2   MCHC  32.1   RDW  13.6   PLT  411     Recent Labs   Lab Test  05/14/18   0728  04/30/18   1143   NA  138  139   POTASSIUM  3.9  4.0   CHLORIDE  105  105   CO2  26  28   ANIONGAP  6  6   GLC  180*  185*   BUN  16  13   CR  0.61  0.61   ILIANA  9.4  9.4     Liver Function Studies -   Recent Labs   Lab Test  05/14/18   0728   PROTTOTAL  7.2   ALBUMIN  3.6   BILITOTAL  0.4   ALKPHOS  112   AST  23   ALT  20       Results for KITTY CHAVIS (MRN [id number]) as of 3/17/2018 16:51   Ref. Range 1/15/2018 08:40 3/13/2018 09:20   Cancer Antigen 19-9 Latest Ref Range: 0 - 37 U/mL 12 10     RADIOLOGY: most recent scan on 4/2/18 was before surgery      ASSESSMENT/PLAN:  Pancreatic tail adenocarcinoma: Kitty was diagnosed in December 2017 after presenting with pancreatitis. She received 4 cycles of neoadjuvant intent FOLFIRINOX from Jan-March 2018. She had another bout of pancreatitis after the first cycle and neutropenia that delayed cycle 2. Oxaliplatin was reduced to 90% target dose with cycle 3 due to neuropathy.     She had stable disease on imaging after 4 cycles of FOLFIRINOX and proceeded to surgery on 4/17/18, about 4 weeks ago. The pathology report is very favorable, with no evidence of residual disease (complete pathologic response), negative margins, and 26 negative lymph nodes. We are very pleased with these results.     Now that she has had successful neoadjuvant chemotherapy and surgical resection, the goal is to decrease the risk of recurrence. The current standard of care is adjuvant chemotherapy with gemcitabine/Xeloda per the ESPAC-4 study. We would plan to complete a total of 6 months of neoadjuvant plus adjuvant chemo. Since she had 2  months of neoadjuvant chemo, we will plan for 4 months of adjuvant therapy with gem/Xeloda. The schedule is gemcitabine on days 1, 8, 15, and Xeloda on days 1-21, of a 28-day cycle.     We discussed the potential side effects of gem/Xeloda including low blood counts, infection, bleeding, n/v/d, mucositis, rash, hand foot syndrome, fatigue, and alopecia. She agreed to proceed with this regimen. She would prefer to wait until after the memorial day weekend, so will plan to start the week of May 28. We will get new baseline scan before starting.   She is scheduled to see Dr. Byrd with surgical oncology today as well.     - CT chest/abd/pelvis and Ca 19-9 the week of May 21  - Schedule 4 cycles of gemcitabine/Xeloda, starting Tuesday May 29 if possible  - F/u with Kellie Nobles on day 1/cycle 1 of chemo and with day 1/cycle 2  - RTC with Dr. Monk on day 1/cycle 3    Patient seen and discussed with staff Dr. Aleshia Ponce MD  Heme/Onc Fellow  Pager: [phone]    MEDICAL ONCOLOGY ATTENDING PHYSICIAN ADDENDUM:  I have seen and evaluated this patient with the medical oncology fellow. I have reviewed and edited this fellow's note, and agree with the assessment and plan stated above. A complete review of systems was assessed, and pertinent positives/negatives are discussed in detail above and as follows.    Ms. Bangura returns to the clinic today with her .  She is approximately 4 weeks out from her distal pancreatectomy.  Dr. Byrd had personally conferred with me verbally about the remarkable fact that there was no residual carcinoma seen on histopathologic examination.  There were 26 lymph nodes that were resected and confirmed as benign.  There was evidence of necrotizing pancreatitis which the patient had clinically.  There is also some good treatment response to a limited number of cycles of neoadjuvant FOLFIRINOX, a total of 4 cycles between mid January to mid March.      She has been walking 1 mile a  day.  She has been working with a nutritionist team and taking Creon capsules for enzyme supplementation, 2-3 capsules per meal.  She also met with Dr. Snow from our Palliative Care team and does not have any current needs at this time.      Physical exam is unremarkable.  Surgical scar is well healed.  No overlying fluctuance, erythema or tenderness.  She has no palpable mass in the abdomen area.  There is no pain elicited upon deep palpation.  In the range of HEENT, cardiovascular and lower extremity exam is unremarkable.      My impression is that she has had a phenomenal response to neoadjuvant-intent chemotherapy with no residual carcinoma seen status post distal pancreatectomy.  Reviewed the standard of care encompassing 6 months total of systemic chemotherapy.  Thus 4 months would be remaining based on the ESPAC-4 trial discussed last at the ASCO meeting in 2017.  I recommend gemcitabine 1000 mg/m2 IV days 1, 8 and 15 of each 28-day cycle along with Xeloda b.i.d. on 21 days on, 7 days off routine schedule.  I carefully reviewed the potential risk and side effects up to and including but not limited to chemotherapy-related fatigue, neutropenia and subsequent risk of infection, risk of anemia, thrombocytopenia and subsequent risk of bleeding, alopecia, nausea and vomiting, potential worsening of epigastric discomfort secondary to the chemotherapy, hand-foot syndrome and diarrhea, particularly from the capecitabine and further morbidity.  The patient stated understanding of all the above.  Jesica Hewitt RN, my care coordinator who was present throughout the entirety of today's visit, provided detailed chemotherapy teaching, verbally as well as with printed information.  The patient prefers to start after Memorial Day which would be approximately 6 weeks out from surgery, and I think that would be okay.  We will go as scheduled with an [facility] provider and tentative scheduling of cycle 1, day 1 of therapy at  that time.  Further followup will be made once she has chemotherapy.           I spent 30 minutes in consultation, including H&P and Discussion. >50% of time was spent in counseling and in coordination of care.    Jovany Monk MD, PhD

## 2018-05-14 NOTE — MR AVS SNAPSHOT
After Visit Summary   5/14/2018    Kitty Bangura    MRN: [id number]           Patient Information     Date Of Birth          1960        Visit Information        Provider Department      5/14/2018 7:45 AM Jovany Monk MD [facility]        Today's Diagnoses     Pancreatic cancer (H)           Follow-ups after your visit        Your next 10 appointments already scheduled     May 22, 2018  9:15 AM CDT   LAB with [facility] ([facility])    [address] [address]   [phone]           Please do not eat 10-12 hours before your appointment if you are coming in fasting for labs on lipids, cholesterol, or glucose (sugar). This does not apply to pregnant women. Water, hot tea and black coffee (with nothing added) are okay. Do not drink other fluids, diet soda or chew gum.            May 22, 2018  9:45 AM CDT   CT CHEST/ABDOMEN/PELVIS W CONTRAST with WYCT1   [facility] CT ([facility])    [address] [address]   [phone]           Please bring any scans or X-rays taken at other hospitals, if similar tests were done. Also bring a list of your medicines, including vitamins, minerals and over-the-counter drugs. It is safest to leave personal items at home.  Be sure to tell your doctor:   If you have any allergies.   If there s any chance you are pregnant.   If you are breastfeeding.  How to prepare:   Do not eat or drink for 2 hours before your exam. If you need to take medicine, you may take it with small sips of water. (We may ask you to take liquid medicine as well.)   Please wear loose clothing, such as a sweat suit or jogging clothes. Avoid snaps, zippers and other metal. We may ask you to undress and put on a hospital gown.  Please arrive 30 minutes early for your CT. Once in the department you might be asked to drink water 15-20 minutes prior to your exam.  If indicated you  may be asked to drink an oral contrast in advance of your CT.  If this is the case, the imaging team will let you know or be in contact with you prior to your appointment  Patients over 70 or patients with diabetes or kidney problems:   If you haven t had a blood test (creatinine test) within the last 30 days, the Cardiologist/Radiologist may require you to get this test prior to your exam.  If you have diabetes:   Continue to take your metformin medication on the day of your exam  If you have any questions, please call the Imaging Department where you will have your exam.            May 30, 2018  6:30 AM CDT   [facility] Lab Draw with UC MASONIC LAB DRAW   [facility] Lab Draw ([facility])    [address]  [address]  [place] [address]   [phone]            May 30, 2018  7:00 AM CDT   Infusion 360 with UC ONCOLOGY INFUSION, UC 10 ATC   [facility] ([facility])    [address]  [address]  [place] [address]   [phone]            Jun 05, 2018  6:30 AM CDT   [facility] Lab Draw with [facility] MASONIC LAB DRAW   [facility] Draw ([facility])    [address]  [address]  [place] [address]   [phone]            Jun 05, 2018  7:00 AM CDT   (Arrive by 6:45 AM)   Return Visit with Kellie Nobles PA-C   [facility] ([facility])    [address]  [address]  [place] [address]   [phone]            Jun 05, 2018  8:30 AM CDT   Infusion 360 with UC ONCOLOGY INFUSION, UC 20 ATC   [facility] ([facility])    [address]  [address]  [place] [address]   [phone]            Jun 12, 2018  8:00 AM CDT   [facility] Lab Draw with UC MASONIC LAB DRAW   [facility] Draw ([facility])    [address] [place] [address]  [address]  [facility] [id number] [phone]              Future tests that were ordered for you today     Open Future Orders        Priority Expected Expires Ordered    CT Chest/Abdomen/Pelvis w Contrast Routine 5/24/2018 5/14/2019 5/14/2018    Cancer antigen 19-9 Routine 5/24/2018 5/14/2019 5/14/2018            Who to contact     If you have questions or need follow up information about today's clinic visit or your schedule please contact [facility] directly at [phone].  Normal or non-critical lab and imaging results will be communicated to you by [employer], letter or phone within 4 business days after the clinic has received the results. If you do not hear from us within 7 days, please contact the clinic through [employer] or phone. If you have a critical or abnormal lab result, we will notify you by phone as soon as possible.  Submit refill requests through [employer] or call your pharmacy and they will forward the refill request to us. Please allow 3 business days for your refill to be completed.          Additional Information About Your Visit        [employer] Information     [employer] gives you secure access to your electronic health record. If you see a primary care provider, you can also send messages to your care team and make appointments. If you have questions, please call your primary care clinic.  If you do not have a primary care provider, please call [phone] and they will assist you.        Care EveryWhere ID     This is your Care EveryWhere ID. This could be used by other organizations to access your [place] medical records  [id number]        Your Vitals Were     Pulse Temperature Respirations Height Pulse Oximetry BMI (Body Mass Index)    65 98  F (36.7  C) (Oral) 16 1.651 m (5' 5\") 99% 25.21 kg/m2       Blood Pressure from Last 3 Encounters:   05/14/18 101/72   05/14/18 101/72   05/08/18 (!) 87/58    Weight from Last 3 Encounters:   05/14/18 68.7 kg (151 lb 8 oz)   05/14/18 " 68.7 kg (151 lb 8 oz)   05/08/18 68 kg (150 lb)              We Performed the Following     *CBC with platelets differential     Comprehensive metabolic panel          Today's Medication Changes          These changes are accurate as of 5/14/18 11:59 PM.  If you have any questions, ask your nurse or doctor.               Stop taking these medicines if you haven't already. Please contact your care team if you have questions.     LORazepam 0.5 MG tablet   Commonly known as:  ATIVAN   Stopped by:  Jovany Monk MD           prochlorperazine 10 MG tablet   Commonly known as:  COMPAZINE   Stopped by:  Jovany Monk MD                    Primary Care Provider Office Phone # Fax #    Josenargis Julito Mcgill [phone] [address] [address]        Equal Access to Services     [facility]: Tre Ruiz, waaxfredy luqadaha, qaybta kaalmada chrissyafredy, re gerardo . So [facility] [place] [phone].    ATENCIÓN: Si habla español, tiene a quiros disposición servicios gratuitos de asistencia lingüística. [facility] [phone].    We comply with applicable federal civil rights laws and [place] laws. We do not discriminate on the basis of race, color, national origin, age, disability, sex, sexual orientation, or gender identity.            Thank you!     Thank you for choosing [facility]  for your care. Our goal is always to provide you with excellent care. Hearing back from our patients is one way we can continue to improve our services. Please take a few minutes to complete the written survey that you may receive in the mail after your visit with us. Thank you!             Your Updated Medication List - Protect others around you: Learn how to safely use, store and throw away your medicines at www.disposemymeds.org.          This list is accurate as of 5/14/18 11:59 PM.  Always use your most recent med list.                   Brand Name Dispense  Instructions for use Diagnosis    acetaminophen 500 MG tablet    TYLENOL    100 tablet    Take 2 tablets (1,000 mg) by mouth every 8 hours    Acute post-operative pain       amylase-lipase-protease 39813-89991 units Cpep per EC capsule    CREON    450 capsule    Take 2-3 with meals / 1-2 with snacks, up to 15 per day.    Necrotizing pancreatitis       enoxaparin 40 MG/0.4ML injection    LOVENOX    9.2 mL    Inject 0.4 mLs (40 mg) Subcutaneous daily for 23 days    Pancreatic adenocarcinoma (H)       ondansetron 8 MG tablet    ZOFRAN    30 tablet    Take 1 tablet (8 mg) by mouth every 8 hours as needed for nausea    Pancreatic adenocarcinoma (H)       pantoprazole 40 MG EC tablet    PROTONIX    30 tablet    Take 1 tablet (40 mg) by mouth every morning    Pancreatic adenocarcinoma (H)       polyethylene glycol Packet    MIRALAX/GLYCOLAX    30 packet    Take 17 g by mouth daily    Drug-induced constipation

## 2018-05-14 NOTE — NURSING NOTE
"Oncology Rooming Note    May 14, 2018 8:56 AM   Kitty Bangura is a 57 year old female who presents for:    Chief Complaint   Patient presents with     Oncology Clinic Visit     Return: Post-op- Pancreas cancer      Initial Vitals: /72 (BP Location: Right arm)  Pulse 65  Temp 98  F (36.7  C) (Oral)  Resp 16  Ht 1.651 m (5' 5\")  Wt 68.7 kg (151 lb 8 oz)  SpO2 99%  BMI 25.21 kg/m2 Estimated body mass index is 25.21 kg/(m^2) as calculated from the following:    Height as of this encounter: 1.651 m (5' 5\").    Weight as of this encounter: 68.7 kg (151 lb 8 oz). Body surface area is 1.78 meters squared.  No Pain (0) Comment: Data Unavailable   No LMP recorded. Patient is postmenopausal.  Allergies reviewed: Yes  Medications reviewed: Yes    Medications: Medication refills not needed today.  Pharmacy name entered into [facility]:    [id number] - [place], [place] - [address] [place] [address] [id number] - [place], [place] - [address]    Clinical concerns: N/A       Marleen Alvarez CMA              "

## 2018-05-15 ENCOUNTER — CARE COORDINATION (OUTPATIENT)
Dept: ONCOLOGY | Facility: CLINIC | Age: 58
End: 2018-05-15

## 2018-05-15 NOTE — PROGRESS NOTES
The patient was called and informed that the Pharmacy Liaison checked the Xeloda prescription, and they found that the co-pay is $0 and it can be filled at the [facility] pharmacy.  She was told that she will receive the Xeloda with her infusion appointment on 5/30/18.

## 2018-05-16 ENCOUNTER — TELEPHONE (OUTPATIENT)
Dept: ONCOLOGY | Facility: CLINIC | Age: 58
End: 2018-05-16

## 2018-05-16 DIAGNOSIS — Z79.899 ENCOUNTER FOR LONG-TERM (CURRENT) USE OF MEDICATIONS: Primary | ICD-10-CM

## 2018-05-16 NOTE — TELEPHONE ENCOUNTER
"Oral Chemotherapy Monitoring Program    Primary Oncologist: Dr. Monk  Primary Oncology Clinic: [facility]  Cancer Diagnosis: Pancreatic Cancer    Drug: Xeloda 1500mg (3 x 500mg) BID, 3 weeks on 1 week off   Start Date: 5/30/18 *anticipated*  Dose is appropriate for patients:  BSA   Expected duration of therapy: Until disease progression or unacceptable toxicity    Drug Interaction Assessment: Xeloda + PPI = potential for decreased effectiveness Monitor.  Xeloda + Zofran = increased risk for QTcP      Lab Monitoring Plan  Monitoring plan for the Q3 week cycle:   C1D1+   CMP, CBC C2D1+ Call, CMP, CBC C3D1+ Call, CMP, CBC C4D1+ Call, CMP, CBC C5D1+ Call, CMP, CBC C6D1+ Call, CMP, CBC   C1D8+ Call C2D8+  C3D8+  C4D8+  C5D8+  C6D8+    C1D15+  C2D15+  C3D15+  C4D15+  C5D15+  C6D15+    Monitoring plan for the 5 days per week x 5-6 weeks with XRT:   C1D1+   CMP, CBC C1D15+  C1D29+ call         C1D8+ Call C1D22+ Call, CMP, CBC C1D36+            Subjective/Objective:  Kitty Bangura is a 57 year old female contacted by phone for an initial visit for oral chemotherapy education.     ORAL CHEMOTHERAPY 5/16/2018   Drug Name Xeloda (Capecitabine)   Current Dosage 1500mg   Current Schedule BID   Cycle Details 3 weeks on 1 week off   Planned next cycle start date 5/30/2018       Last PHQ-2 Score on record:   PHQ-2 ( 1999 [employer]) 5/4/2018 3/1/2018   Q1: Little interest or pleasure in doing things 0 0   Q2: Feeling down, depressed or hopeless 0 0   PHQ-2 Score 0 0       Vitals:  BP:   BP Readings from Last 1 Encounters:   05/14/18 101/72     Wt Readings from Last 1 Encounters:   05/14/18 68.7 kg (151 lb 8 oz)     Estimated body surface area is 1.78 meters squared as calculated from the following:    Height as of 5/14/18: 1.651 m (5' 5\").    Weight as of 5/14/18: 68.7 kg (151 lb 8 oz).      Labs:  _  Result Component Current Result Ref Range   Sodium 138 (5/14/2018) 133 - 144 mmol/L     _  Result Component Current Result " Ref Range   Potassium 3.9 (5/14/2018) 3.4 - 5.3 mmol/L     _  Result Component Current Result Ref Range   Calcium 9.4 (5/14/2018) 8.5 - 10.1 mg/dL     _  Result Component Current Result Ref Range   Magnesium 2.1 (4/21/2018) 1.6 - 2.3 mg/dL     _  Result Component Current Result Ref Range   Phosphorus 3.9 (4/21/2018) 2.5 - 4.5 mg/dL     _  Result Component Current Result Ref Range   Albumin 3.6 (5/14/2018) 3.4 - 5.0 g/dL     _  Result Component Current Result Ref Range   Urea Nitrogen 16 (5/14/2018) 7 - 30 mg/dL     _  Result Component Current Result Ref Range   Creatinine 0.61 (5/14/2018) 0.52 - 1.04 mg/dL       _  Result Component Current Result Ref Range   AST 23 (5/14/2018) 0 - 45 U/L     _  Result Component Current Result Ref Range   ALT 20 (5/14/2018) 0 - 50 U/L     _  Result Component Current Result Ref Range   Bilirubin Total 0.4 (5/14/2018) 0.2 - 1.3 mg/dL       _  Result Component Current Result Ref Range   WBC 6.9 (5/14/2018) 4.0 - 11.0 10e9/L     _  Result Component Current Result Ref Range   Hemoglobin 11.8 (5/14/2018) 11.7 - 15.7 g/dL     _  Result Component Current Result Ref Range   Platelet Count 411 (5/14/2018) 150 - 450 10e9/L     _  Result Component Current Result Ref Range   Absolute Neutrophil 2.3 (5/14/2018) 1.6 - 8.3 10e9/L       Assessment:  Patient is appropriate to start therapy on 5/30/18.    Plan:  Basic chemotherapy teaching was reviewed with the patient including indication, start date of therapy, dose, administration, adverse effects, missed doses, food and drug interactions, monitoring, side effect management, office contact information, and safe handling. Written materials were provided and all questions answered.    Follow-Up:  Follow up by phone call in 1 week after initiation of therapy to monitor medication adherence and side effect profile.     Gregg Keyes  Pharmacy Intern  [facility]  [phone]

## 2018-05-20 ENCOUNTER — DOCUMENTATION ONLY (OUTPATIENT)
Dept: ONCOLOGY | Facility: CLINIC | Age: 58
End: 2018-05-20

## 2018-05-21 RX ORDER — IOPAMIDOL 755 MG/ML
74 INJECTION, SOLUTION INTRAVASCULAR ONCE
Status: COMPLETED | OUTPATIENT
Start: 2018-05-22 | End: 2018-05-22

## 2018-05-22 ENCOUNTER — HOSPITAL ENCOUNTER (OUTPATIENT)
Dept: CT IMAGING | Facility: CLINIC | Age: 58
Discharge: HOME | End: 2018-05-22
Attending: INTERNAL MEDICINE | Admitting: INTERNAL MEDICINE
Payer: COMMERCIAL

## 2018-05-22 ENCOUNTER — INFUSION THERAPY VISIT (OUTPATIENT)
Dept: INFUSION THERAPY | Facility: CLINIC | Age: 58
End: 2018-05-22
Attending: INTERNAL MEDICINE
Payer: COMMERCIAL

## 2018-05-22 DIAGNOSIS — C25.9 PANCREATIC CANCER (H): ICD-10-CM

## 2018-05-22 DIAGNOSIS — Z79.899 ENCOUNTER FOR LONG-TERM (CURRENT) USE OF MEDICATIONS: ICD-10-CM

## 2018-05-22 LAB
ALBUMIN SERPL-MCNC: 3.7 G/DL (ref 3.4–5)
ALP SERPL-CCNC: 140 U/L (ref 40–150)
ALT SERPL W P-5'-P-CCNC: 24 U/L (ref 0–50)
ANION GAP SERPL CALCULATED.3IONS-SCNC: 6 MMOL/L (ref 3–14)
AST SERPL W P-5'-P-CCNC: 20 U/L (ref 0–45)
BASOPHILS # BLD AUTO: 0.1 10E9/L (ref 0–0.2)
BASOPHILS NFR BLD AUTO: 0.7 %
BILIRUB SERPL-MCNC: 0.4 MG/DL (ref 0.2–1.3)
BUN SERPL-MCNC: 15 MG/DL (ref 7–30)
CALCIUM SERPL-MCNC: 9.2 MG/DL (ref 8.5–10.1)
CHLORIDE SERPL-SCNC: 104 MMOL/L (ref 94–109)
CO2 SERPL-SCNC: 28 MMOL/L (ref 20–32)
CREAT SERPL-MCNC: 0.55 MG/DL (ref 0.52–1.04)
DIFFERENTIAL METHOD BLD: NORMAL
EOSINOPHIL # BLD AUTO: 0.4 10E9/L (ref 0–0.7)
EOSINOPHIL NFR BLD AUTO: 5.8 %
ERYTHROCYTE [DISTWIDTH] IN BLOOD BY AUTOMATED COUNT: 12.9 % (ref 10–15)
GFR SERPL CREATININE-BSD FRML MDRD: >90 ML/MIN/1.7M2
GLUCOSE SERPL-MCNC: 263 MG/DL (ref 70–99)
HCT VFR BLD AUTO: 38.3 % (ref 35–47)
HGB BLD-MCNC: 12.5 G/DL (ref 11.7–15.7)
IMM GRANULOCYTES # BLD: 0 10E9/L (ref 0–0.4)
IMM GRANULOCYTES NFR BLD: 0 %
LYMPHOCYTES # BLD AUTO: 2.7 10E9/L (ref 0.8–5.3)
LYMPHOCYTES NFR BLD AUTO: 36.6 %
MCH RBC QN AUTO: 30.7 PG (ref 26.5–33)
MCHC RBC AUTO-ENTMCNC: 32.6 G/DL (ref 31.5–36.5)
MCV RBC AUTO: 94 FL (ref 78–100)
MONOCYTES # BLD AUTO: 0.8 10E9/L (ref 0–1.3)
MONOCYTES NFR BLD AUTO: 11.4 %
NEUTROPHILS # BLD AUTO: 3.3 10E9/L (ref 1.6–8.3)
NEUTROPHILS NFR BLD AUTO: 45.5 %
PLATELET # BLD AUTO: 367 10E9/L (ref 150–450)
POTASSIUM SERPL-SCNC: 4.3 MMOL/L (ref 3.4–5.3)
PROT SERPL-MCNC: 7.6 G/DL (ref 6.8–8.8)
RBC # BLD AUTO: 4.07 10E12/L (ref 3.8–5.2)
SODIUM SERPL-SCNC: 138 MMOL/L (ref 133–144)
WBC # BLD AUTO: 7.3 10E9/L (ref 4–11)

## 2018-05-22 PROCEDURE — 36591 DRAW BLOOD OFF VENOUS DEVICE: CPT

## 2018-05-22 PROCEDURE — 74177 CT ABD & PELVIS W/CONTRAST: CPT

## 2018-05-22 PROCEDURE — 86301 IMMUNOASSAY TUMOR CA 19-9: CPT | Performed by: INTERNAL MEDICINE

## 2018-05-22 PROCEDURE — 25000128 H RX IP 250 OP 636: Performed by: RADIOLOGY

## 2018-05-22 PROCEDURE — 80053 COMPREHEN METABOLIC PANEL: CPT | Performed by: INTERNAL MEDICINE

## 2018-05-22 PROCEDURE — 85025 COMPLETE CBC W/AUTO DIFF WBC: CPT | Performed by: INTERNAL MEDICINE

## 2018-05-22 PROCEDURE — 25000125 ZZHC RX 250: Performed by: RADIOLOGY

## 2018-05-22 RX ADMIN — SODIUM CHLORIDE 58 ML: 9 INJECTION, SOLUTION INTRAVENOUS at 09:52

## 2018-05-22 RX ADMIN — HEPARIN 500 UNITS: 100 SYRINGE at 10:01

## 2018-05-22 RX ADMIN — IOPAMIDOL 74 ML: 755 INJECTION, SOLUTION INTRAVENOUS at 09:52

## 2018-05-22 NOTE — PROGRESS NOTES
Infusion Nursing Note:  Kitty Bangura presents today for PAC labs.    Patient seen by provider today: No   present during visit today: Not Applicable.    Note: N/A.    Intravenous Access:  Implanted Port.    Treatment Conditions:  Not Applicable.      Post Infusion Assessment:  Patient tolerated procedure without incident.    Discharge Plan:   Patient discharged in stable condition accompanied by: self.    Rosalino Boss RN

## 2018-05-23 LAB — CANCER AG19-9 SERPL-ACNC: 7 U/ML (ref 0–37)

## 2018-05-29 RX ORDER — PROCHLORPERAZINE MALEATE 10 MG
10 TABLET ORAL EVERY 6 HOURS PRN
Qty: 30 TABLET | Refills: 2 | Status: SHIPPED | OUTPATIENT
Start: 2018-05-29 | End: 2018-10-09

## 2018-05-29 RX ORDER — LORAZEPAM 0.5 MG/1
0.5 TABLET ORAL EVERY 4 HOURS PRN
Qty: 30 TABLET | Refills: 2 | Status: SHIPPED | OUTPATIENT
Start: 2018-05-29 | End: 2018-10-09

## 2018-05-30 ENCOUNTER — APPOINTMENT (OUTPATIENT)
Dept: LAB | Facility: CLINIC | Age: 58
End: 2018-05-30
Attending: INTERNAL MEDICINE
Payer: COMMERCIAL

## 2018-05-30 ENCOUNTER — INFUSION THERAPY VISIT (OUTPATIENT)
Dept: ONCOLOGY | Facility: CLINIC | Age: 58
End: 2018-05-30
Attending: INTERNAL MEDICINE
Payer: COMMERCIAL

## 2018-05-30 ENCOUNTER — ONCOLOGY VISIT (OUTPATIENT)
Dept: ONCOLOGY | Facility: CLINIC | Age: 58
End: 2018-05-30
Attending: PHYSICIAN ASSISTANT
Payer: COMMERCIAL

## 2018-05-30 VITALS
WEIGHT: 152 LBS | HEART RATE: 78 BPM | SYSTOLIC BLOOD PRESSURE: 89 MMHG | DIASTOLIC BLOOD PRESSURE: 63 MMHG | TEMPERATURE: 97.6 F | BODY MASS INDEX: 25.29 KG/M2 | OXYGEN SATURATION: 97 % | RESPIRATION RATE: 18 BRPM

## 2018-05-30 VITALS — SYSTOLIC BLOOD PRESSURE: 93 MMHG | DIASTOLIC BLOOD PRESSURE: 62 MMHG

## 2018-05-30 DIAGNOSIS — K59.09 OTHER CONSTIPATION: ICD-10-CM

## 2018-05-30 DIAGNOSIS — C25.9 PANCREATIC ADENOCARCINOMA (H): Primary | ICD-10-CM

## 2018-05-30 DIAGNOSIS — K85.91 NECROTIZING PANCREATITIS: ICD-10-CM

## 2018-05-30 DIAGNOSIS — R73.9 HYPERGLYCEMIA: ICD-10-CM

## 2018-05-30 DIAGNOSIS — E11.9 DIABETES MELLITUS WITHOUT COMPLICATION (H): ICD-10-CM

## 2018-05-30 LAB
ALBUMIN SERPL-MCNC: 3.5 G/DL (ref 3.4–5)
ALP SERPL-CCNC: 126 U/L (ref 40–150)
ALT SERPL W P-5'-P-CCNC: 26 U/L (ref 0–50)
ANION GAP SERPL CALCULATED.3IONS-SCNC: 6 MMOL/L (ref 3–14)
AST SERPL W P-5'-P-CCNC: 25 U/L (ref 0–45)
BASOPHILS # BLD AUTO: 0.1 10E9/L (ref 0–0.2)
BASOPHILS NFR BLD AUTO: 1.2 %
BILIRUB SERPL-MCNC: 0.3 MG/DL (ref 0.2–1.3)
BUN SERPL-MCNC: 16 MG/DL (ref 7–30)
CALCIUM SERPL-MCNC: 9.3 MG/DL (ref 8.5–10.1)
CHLORIDE SERPL-SCNC: 104 MMOL/L (ref 94–109)
CO2 SERPL-SCNC: 27 MMOL/L (ref 20–32)
CREAT SERPL-MCNC: 0.63 MG/DL (ref 0.52–1.04)
DIFFERENTIAL METHOD BLD: NORMAL
EOSINOPHIL # BLD AUTO: 0.3 10E9/L (ref 0–0.7)
EOSINOPHIL NFR BLD AUTO: 4.9 %
ERYTHROCYTE [DISTWIDTH] IN BLOOD BY AUTOMATED COUNT: 13.3 % (ref 10–15)
GFR SERPL CREATININE-BSD FRML MDRD: >90 ML/MIN/1.7M2
GLUCOSE SERPL-MCNC: 297 MG/DL (ref 70–99)
HBA1C MFR BLD: 7.8 % (ref 0–5.6)
HCT VFR BLD AUTO: 38 % (ref 35–47)
HGB BLD-MCNC: 12.2 G/DL (ref 11.7–15.7)
IMM GRANULOCYTES # BLD: 0 10E9/L (ref 0–0.4)
IMM GRANULOCYTES NFR BLD: 0.1 %
LYMPHOCYTES # BLD AUTO: 3.1 10E9/L (ref 0.8–5.3)
LYMPHOCYTES NFR BLD AUTO: 44.3 %
MCH RBC QN AUTO: 30.8 PG (ref 26.5–33)
MCHC RBC AUTO-ENTMCNC: 32.1 G/DL (ref 31.5–36.5)
MCV RBC AUTO: 96 FL (ref 78–100)
MONOCYTES # BLD AUTO: 0.9 10E9/L (ref 0–1.3)
MONOCYTES NFR BLD AUTO: 13.5 %
NEUTROPHILS # BLD AUTO: 2.5 10E9/L (ref 1.6–8.3)
NEUTROPHILS NFR BLD AUTO: 36 %
NRBC # BLD AUTO: 0 10*3/UL
NRBC BLD AUTO-RTO: 0 /100
PLATELET # BLD AUTO: 384 10E9/L (ref 150–450)
POTASSIUM SERPL-SCNC: 4.3 MMOL/L (ref 3.4–5.3)
PROT SERPL-MCNC: 7.1 G/DL (ref 6.8–8.8)
RBC # BLD AUTO: 3.96 10E12/L (ref 3.8–5.2)
SODIUM SERPL-SCNC: 138 MMOL/L (ref 133–144)
WBC # BLD AUTO: 6.9 10E9/L (ref 4–11)

## 2018-05-30 PROCEDURE — 80053 COMPREHEN METABOLIC PANEL: CPT | Performed by: INTERNAL MEDICINE

## 2018-05-30 PROCEDURE — 83036 HEMOGLOBIN GLYCOSYLATED A1C: CPT | Performed by: PHYSICIAN ASSISTANT

## 2018-05-30 PROCEDURE — 99214 OFFICE O/P EST MOD 30 MIN: CPT | Mod: ZP | Performed by: PHYSICIAN ASSISTANT

## 2018-05-30 PROCEDURE — 25000128 H RX IP 250 OP 636: Mod: ZF | Performed by: INTERNAL MEDICINE

## 2018-05-30 PROCEDURE — 96413 CHEMO IV INFUSION 1 HR: CPT

## 2018-05-30 PROCEDURE — 25000128 H RX IP 250 OP 636: Mod: ZF | Performed by: PHYSICIAN ASSISTANT

## 2018-05-30 PROCEDURE — 96367 TX/PROPH/DG ADDL SEQ IV INF: CPT

## 2018-05-30 PROCEDURE — 85025 COMPLETE CBC W/AUTO DIFF WBC: CPT | Performed by: INTERNAL MEDICINE

## 2018-05-30 PROCEDURE — 83036 HEMOGLOBIN GLYCOSYLATED A1C: CPT | Performed by: INTERNAL MEDICINE

## 2018-05-30 RX ORDER — CAPECITABINE 500 MG/1
830 TABLET, FILM COATED ORAL 2 TIMES DAILY
Qty: 126 TABLET | Refills: 0 | Status: SHIPPED | OUTPATIENT
Start: 2018-05-30 | End: 2018-06-20

## 2018-05-30 RX ORDER — HEPARIN SODIUM (PORCINE) LOCK FLUSH IV SOLN 100 UNIT/ML 100 UNIT/ML
5 SOLUTION INTRAVENOUS ONCE
Status: COMPLETED | OUTPATIENT
Start: 2018-05-30 | End: 2018-05-30

## 2018-05-30 RX ORDER — HEPARIN SODIUM (PORCINE) LOCK FLUSH IV SOLN 100 UNIT/ML 100 UNIT/ML
5 SOLUTION INTRAVENOUS EVERY 8 HOURS PRN
Status: DISCONTINUED | OUTPATIENT
Start: 2018-05-30 | End: 2018-05-30 | Stop reason: HOSPADM

## 2018-05-30 RX ORDER — DOCUSATE SODIUM 100 MG/1
100 CAPSULE, LIQUID FILLED ORAL DAILY
Qty: 60 CAPSULE | Refills: 1 | Status: SHIPPED | OUTPATIENT
Start: 2018-05-30 | End: 2018-10-09

## 2018-05-30 RX ADMIN — Medication 5 ML: at 09:25

## 2018-05-30 RX ADMIN — SODIUM CHLORIDE 250 ML: 9 INJECTION, SOLUTION INTRAVENOUS at 07:53

## 2018-05-30 RX ADMIN — SODIUM CHLORIDE, PRESERVATIVE FREE 5 ML: 5 INJECTION INTRAVENOUS at 06:39

## 2018-05-30 RX ADMIN — DEXAMETHASONE SODIUM PHOSPHATE 12 MG: 10 INJECTION, SOLUTION INTRAMUSCULAR; INTRAVENOUS at 08:08

## 2018-05-30 RX ADMIN — GEMCITABINE 1800 MG: 38 INJECTION, SOLUTION INTRAVENOUS at 08:38

## 2018-05-30 ASSESSMENT — PAIN SCALES - GENERAL
PAINLEVEL: NO PAIN (0)
PAINLEVEL: NO PAIN (0)

## 2018-05-30 NOTE — LETTER
5/30/2018      RE: Kitty Bangura  [address] [address]       Oncology/Hematology Visit Note  May 30, 2018    Reason for Visit: Follow up of resectable pancreatic cancer    History of Present Illness: Staging CT chest/abd/pelvis on 12/9/17 showed several small pulmonary nodules (largest 3mm), unchanged mass in the pancreatic tail, mildly prominent mesenteric nodes thought likely reactive, and small right hepatic lobe hypodensities. Abdominal MRI on 12/10/17 showed hepatic hemangiomas, no evidence of metastatic disease to the liver.   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     Kitty met with Dr Monk and discussed neoadjuvant chemotherapy with FOLFIRINOX, then surgery and then adjuvant chemotherapy. She was seen on 1/15/18 for cycle 1 and then hospitalized for pancreatitis on 1/20/18 where she was managed with IV fluds and pain control. GI took her to the OR to attempt pancreatic duct stent placement but unfortunately it was completely obstructed. They were able to place a pancreatic stent and perform an EUS cyst-gastrostomy with plans to repeat Xray a month following to ensure stent passage. She was d/c 1/27. Cycle 2 was given 2/5/18 (a week delayed due to hospitalization).     She went to ED on 2/11 with worsening abdominal pain and was worried about pancreatitis. W/u with CT and labs including lipase were negative. She had elevated WBC of 33K but had Neulasta on 2/8.     Cycle 3 was given on 2/20 with dose-reduction (10%) of oxaliplatin due to significant cold sensitivity and motor neuropathy. Complicated by bronchitis.    Cycle 4 was given 3/8/18. She went on to have distal pancreatotomy and splenectomy and cholecystectomy on 4/17/18. Final pathology showed complete pathologic response, 26 benign lymph nodes, and necrotizing pancreatitis.     She presents to start adjuvant chemotherapy with  Xeloda and gemcitabine today. She had baseline CT on 5/22/18 showing no evidence of recurrent disease.     Interval History:  Mrs. Bangura returns to clinic today with her . She is feeling well. She has been walking daily over a mile at a time. She has a good appetite. No post-prandial nausea or pain. She has some post-op pain still in LUQ but generally this has gotten much better. She has hard initial BMs and feels sore rectally. No hematochezia. She has used Senna or Miralax but then has overt diarrhea. She has noticed increase in blood sugars--today 297. Has had polydipsia and polyuria. No recent fevers/chills or infectious concerns. Energy has been good.     Review of Systems:  Patient denies fevers, chills, night sweats, unexplained weight changes, headaches, dizziness, vision or hearing changes, new lumps or bumps, chest pain, shortness of breath, cough, abdominal pain, nausea, vomiting, changes to bowel or bladder, swelling of extremities, bleeding issues, or rash.    Current Outpatient Prescriptions   Medication Sig Dispense Refill     docusate sodium (COLACE) 100 MG capsule Take 1 capsule (100 mg) by mouth daily 60 capsule 1     acetaminophen (TYLENOL) 500 MG tablet Take 2 tablets (1,000 mg) by mouth every 8 hours 100 tablet 1     amylase-lipase-protease (CREON) 86371-73865 UNITS CPEP per EC capsule Take 2-3 with meals / 1-2 with snacks, up to 15 per day. 450 capsule 6     capecitabine (XELODA) 500 MG tablet CHEMO Take 3 tablets (1,500 mg) by mouth 2 times daily for 21 days Days 1 through 21, then 7 days off. Take within 30 mins after a meal. 126 tablet 0     LORazepam (ATIVAN) 0.5 MG tablet Take 1 tablet (0.5 mg) by mouth every 4 hours as needed (Anxiety, Nausea/Vomiting or Sleep) 30 tablet 2     ondansetron (ZOFRAN) 8 MG tablet Take 1 tablet (8 mg) by mouth every 8 hours as needed for nausea 30 tablet 0     pantoprazole (PROTONIX) 40 MG EC tablet Take 1 tablet (40 mg) by mouth every morning  (Patient not taking: Reported on 5/30/2018) 30 tablet 0     polyethylene glycol (MIRALAX/GLYCOLAX) Packet Take 17 g by mouth daily 30 packet 0     prochlorperazine (COMPAZINE) 10 MG tablet Take 1 tablet (10 mg) by mouth every 6 hours as needed (Nausea/Vomiting) 30 tablet 2       Physical Examination:  BP (!) 89/63 (BP Location: Right arm, Patient Position: Sitting, Cuff Size: Adult Regular)  Pulse 78  Temp 97.6  F (36.4  C) (Oral)  Resp 18  Wt 68.9 kg (152 lb)  SpO2 97%  BMI 25.29 kg/m2  Wt Readings from Last 10 Encounters:   05/30/18 68.9 kg (152 lb)   05/14/18 68.7 kg (151 lb 8 oz)   05/14/18 68.7 kg (151 lb 8 oz)   05/08/18 68 kg (150 lb)   05/04/18 69.4 kg (153 lb)   04/30/18 69.9 kg (154 lb)   04/21/18 74.8 kg (164 lb 12.8 oz)   04/02/18 73.1 kg (161 lb 2.5 oz)   04/02/18 72.4 kg (159 lb 11.2 oz)   03/19/18 70.9 kg (156 lb 3.2 oz)     Constitutional: Well-appearing female in no acute distress.  Eyes: EOMI, PERRL. No scleral icterus.  ENT: Oral mucosa is moist without lesions or thrush.  Lymphatic: Neck is supple without cervical or supraclavicular lymphadenopathy.   Cardiovascular: Regular rate and rhythm. No murmurs, gallops, or rubs. No peripheral edema.  Respiratory: Clear to auscultation bilaterally. No wheezes or crackles. Clear with coughing.   Gastrointestinal: Bowel sounds present. Abdomen soft, mildly tender to palpation throughout. No palpable hepatosplenomegaly or masses.   Neurologic: Cranial nerves II through XII are grossly intact.  Skin: No rashes, petechiae, or bruising noted on exposed skin.    Laboratory Data:   5/30/2018 06:38   Sodium 138   Potassium 4.3   Chloride 104   Carbon Dioxide 27   Urea Nitrogen 16   Creatinine 0.63   GFR Estimate >90   GFR Estimate If Black >90   Calcium 9.3   Anion Gap 6   Albumin 3.5   Protein Total 7.1   Bilirubin Total 0.3   Alkaline Phosphatase 126   ALT 26   AST 25   Hemoglobin A1C 7.8 (H)   Glucose 297 (H)   WBC 6.9   Hemoglobin 12.2   Hematocrit 38.0    Platelet Count 384   RBC Count 3.96   MCV 96   MCH 30.8   MCHC 32.1   RDW 13.3   Diff Method Automated Method   % Neutrophils 36.0   % Lymphocytes 44.3   % Monocytes 13.5   % Eosinophils 4.9   % Basophils 1.2   % Immature Granulocytes 0.1   Nucleated RBCs 0   Absolute Neutrophil 2.5   Absolute Lymphocytes 3.1   Absolute Monocytes 0.9   Absolute Eosinophils 0.3   Absolute Basophils 0.1   Abs Immature Granulocytes 0.0   Absolute Nucleated RBC 0.0       Assessment and Plan:  1. Pancreatic cancer  S/p 4 cycles of neoadjuvant  FOLFIRINOX with good response so she was able to undergo distal pancreatectomy and splenectomy. Her pathology showed complete pathologic response. Plan to start adjuvant chemotherapy with gemcitabine and xeloda and give 4 months of treatment followed by surveillance. She is feeling well today to proceed with treatment. Reviewed common side effects with treatment including myelosuppression, fatigue, mild GI side effects, hand-foot syndrome. Follow-up in 2 weeks prior to day 15.     2. Hyperglycemia: Non-fasting glucose today is 297. Checked Hgb A1c today resulting at 7.8. We use 12 mg of dexamethasone with gemcitabine infusions so will set up with endocrinology ASAP for management.     3. Rectal irritation: Start daily docusate to keep stools soft. If no improvement, will send to Colorectal.     Kellie Nobles PA-C  [facility]  [address] [place] [address] [phone]

## 2018-05-30 NOTE — PATIENT INSTRUCTIONS
Contact Numbers    [facility] Main Line: [phone]  [facility] Triage and after hours / weekends / holidays:  [phone]      Please call the triage or after hours line if you experience a temperature greater than or equal to 100.5, shaking chills, have uncontrolled nausea, vomiting and/or diarrhea, dizziness, shortness of breath, chest pain, bleeding, unexplained bruising, or if you have any other new/concerning symptoms, questions or concerns.      If you are having any concerning symptoms or wish to speak to a provider before your next infusion visit, please call your care coordinator or triage to notify them so we can adequately serve you.     If you need a refill on a narcotic prescription or other medication, please call before your infusion appointment.             May 2018   [place] Monday Tuesday Wednesday Thursday Friday Saturday             1     2     3     4     [facility] NEW    8:45 AM   (60 min.)   Brandyn Snow MD   [facility] 5       6     7     8     Admission    7:16 AM   González Metz MD   [facility] [place], Endoscopy   (Discharge: 5/8/2018)     COMBINED ESOPHAGOSCOPY, GASTROSCOPY, DUODENOSCOPY (EGD)    9:00 AM   González Metz MD   [facility] 9     10     11     12       13     14     [facility] DRAW    7:00 AM   (15 min.)    [facility]   [facility]     [facility] RETURN    7:30 AM   (30 min.)   Jov[facility] Monk [facility]   [facility]    8:45 AM   (30 min.)   Ja Byrd MD   [facility] 15     16     17     18     19       20     21     22     LEVEL 0    9:00 AM   (30 min.)   ROOM 7 [facility] Infusion     CT CHEST/ABDOMEN/PELVIS W    9:45 AM   (30 min.)   [address] 23     24     25     26       27     28     29     30     [facility] DRAW    6:30 AM   (15 min.)   [facility]   [facility]     [facility] RETURN    6:45 AM   (50 min.)   Kellie Nobles PA-C M  [facility]     [facility] ONC INFUSION 360    7:00 AM   (360 min.)   [facility] ONCOLOGY INFUSION   [facility] 31 June 2018 Sunday Monday Tuesday Wednesday Thursday Friday Saturday                            1     2       3     4     5     [facility] DRAW    8:00 AM   (15 min.)   [facility] DRAW   [facility] Draw     [facility] ONC INFUSION 360    8:30 AM   (360 min.)   [facility] ONCOLOGY INFUSION   [facility] 6     7     8     9       10     11     12     [facility] DRAW    8:00 AM   (15 min.)   [facility] DRAW   [facility] Draw     [facility] RETURN    8:25 AM   (50 min.)   Kellie Nobles PA-C [facility]     [facility] ONC INFUSION 60    9:30 AM   (60 min.)   [facility] ONCOLOGY INFUSION   [facility] 13     14     15     16       17     18     19     20     21     22     23       24     25     26     [facility] DRAW    6:30 AM   (15 min.)   [facility] DRAW   [facility] Draw     [facility] RETURN    6:45 AM   (50 min.)   Kellie Nobles PA-C [facility]     [facility] ONC INFUSION 60    8:30 AM   (60 min.)    ONCOLOGY INFUSION   [facility] 27     28     29     30                 Recent Results (from the past 24 hour(s))   CBC with platelets differential    Collection Time: 05/30/18  6:38 AM   Result Value Ref Range    WBC 6.9 4.0 - 11.0 10e9/L    RBC Count 3.96 3.8 - 5.2 10e12/L    Hemoglobin 12.2 11.7 - 15.7 g/dL    Hematocrit 38.0 35.0 - 47.0 %    MCV 96 78 - 100 fl    MCH 30.8 26.5 - 33.0 pg    MCHC 32.1 31.5 - 36.5 g/dL    RDW 13.3 10.0 - 15.0 %    Platelet Count 384 150 - 450 10e9/L    Diff Method Automated Method     % Neutrophils 36.0 %    % Lymphocytes 44.3 %    % Monocytes 13.5 %    % Eosinophils 4.9 %    % Basophils 1.2 %    % Immature Granulocytes 0.1 %    Nucleated RBCs 0 0 /100    Absolute Neutrophil 2.5 1.6 - 8.3 10e9/L     Absolute Lymphocytes 3.1 0.8 - 5.3 10e9/L    Absolute Monocytes 0.9 0.0 - 1.3 10e9/L    Absolute Eosinophils 0.3 0.0 - 0.7 10e9/L    Absolute Basophils 0.1 0.0 - 0.2 10e9/L    Abs Immature Granulocytes 0.0 0 - 0.4 10e9/L    Absolute Nucleated RBC 0.0    Comprehensive metabolic panel    Collection Time: 05/30/18  6:38 AM   Result Value Ref Range    Sodium 138 133 - 144 mmol/L    Potassium 4.3 3.4 - 5.3 mmol/L    Chloride 104 94 - 109 mmol/L    Carbon Dioxide 27 20 - 32 mmol/L    Anion Gap 6 3 - 14 mmol/L    Glucose 297 (H) 70 - 99 mg/dL    Urea Nitrogen 16 7 - 30 mg/dL    Creatinine 0.63 0.52 - 1.04 mg/dL    GFR Estimate >90 >60 mL/min/1.7m2    GFR Estimate If Black >90 >60 mL/min/1.7m2    Calcium 9.3 8.5 - 10.1 mg/dL    Bilirubin Total 0.3 0.2 - 1.3 mg/dL    Albumin 3.5 3.4 - 5.0 g/dL    Protein Total 7.1 6.8 - 8.8 g/dL    Alkaline Phosphatase 126 40 - 150 U/L    ALT 26 0 - 50 U/L    AST 25 0 - 45 U/L   Hemoglobin A1c    Collection Time: 05/30/18  6:38 AM   Result Value Ref Range    Hemoglobin A1C 7.8 (H) 0 - 5.6 %

## 2018-05-30 NOTE — PROGRESS NOTES
Oncology/Hematology Visit Note  May 30, 2018    Reason for Visit: Follow up of resectable pancreatic cancer    History of Present Illness: Staging CT chest/abd/pelvis on 12/9/17 showed several small pulmonary nodules (largest 3mm), unchanged mass in the pancreatic tail, mildly prominent mesenteric nodes thought likely reactive, and small right hepatic lobe hypodensities. Abdominal MRI on 12/10/17 showed hepatic hemangiomas, no evidence of metastatic disease to the liver.   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     Kitty met with Dr Monk and discussed neoadjuvant chemotherapy with FOLFIRINOX, then surgery and then adjuvant chemotherapy. She was seen on 1/15/18 for cycle 1 and then hospitalized for pancreatitis on 1/20/18 where she was managed with IV fluds and pain control. GI took her to the OR to attempt pancreatic duct stent placement but unfortunately it was completely obstructed. They were able to place a pancreatic stent and perform an EUS cyst-gastrostomy with plans to repeat Xray a month following to ensure stent passage. She was d/c 1/27. Cycle 2 was given 2/5/18 (a week delayed due to hospitalization).     She went to ED on 2/11 with worsening abdominal pain and was worried about pancreatitis. W/u with CT and labs including lipase were negative. She had elevated WBC of 33K but had Neulasta on 2/8.     Cycle 3 was given on 2/20 with dose-reduction (10%) of oxaliplatin due to significant cold sensitivity and motor neuropathy. Complicated by bronchitis.    Cycle 4 was given 3/8/18. She went on to have distal pancreatotomy and splenectomy and cholecystectomy on 4/17/18. Final pathology showed complete pathologic response, 26 benign lymph nodes, and necrotizing pancreatitis.     She presents to start adjuvant chemotherapy with Xeloda and gemcitabine today. She had baseline CT on 5/22/18 showing no evidence of  recurrent disease.     Interval History:  Mrs. Bangura returns to clinic today with her . She is feeling well. She has been walking daily over a mile at a time. She has a good appetite. No post-prandial nausea or pain. She has some post-op pain still in LUQ but generally this has gotten much better. She has hard initial BMs and feels sore rectally. No hematochezia. She has used Senna or Miralax but then has overt diarrhea. She has noticed increase in blood sugars--today 297. Has had polydipsia and polyuria. No recent fevers/chills or infectious concerns. Energy has been good.     Review of Systems:  Patient denies fevers, chills, night sweats, unexplained weight changes, headaches, dizziness, vision or hearing changes, new lumps or bumps, chest pain, shortness of breath, cough, abdominal pain, nausea, vomiting, changes to bowel or bladder, swelling of extremities, bleeding issues, or rash.    Current Outpatient Prescriptions   Medication Sig Dispense Refill     docusate sodium (COLACE) 100 MG capsule Take 1 capsule (100 mg) by mouth daily 60 capsule 1     acetaminophen (TYLENOL) 500 MG tablet Take 2 tablets (1,000 mg) by mouth every 8 hours 100 tablet 1     amylase-lipase-protease (CREON) [address]18-08251 UNITS CPEP per EC capsule Take 2-3 with meals / 1-2 with snacks, up to 15 per day. 450 capsule 6     capecitabine (XELODA) 500 MG tablet CHEMO Take 3 tablets (1,500 mg) by mouth 2 times daily for 21 days Days 1 through 21, then 7 days off. Take within 30 mins after a meal. 126 tablet 0     LORazepam (ATIVAN) 0.5 MG tablet Take 1 tablet (0.5 mg) by mouth every 4 hours as needed (Anxiety, Nausea/Vomiting or Sleep) 30 tablet 2     ondansetron (ZOFRAN) 8 MG tablet Take 1 tablet (8 mg) by mouth every 8 hours as needed for nausea 30 tablet 0     pantoprazole (PROTONIX) 40 MG EC tablet Take 1 tablet (40 mg) by mouth every morning (Patient not taking: Reported on 5/30/2018) 30 tablet 0     polyethylene glycol  (MIRALAX/GLYCOLAX) Packet Take 17 g by mouth daily 30 packet 0     prochlorperazine (COMPAZINE) 10 MG tablet Take 1 tablet (10 mg) by mouth every 6 hours as needed (Nausea/Vomiting) 30 tablet 2       Physical Examination:  BP (!) 89/63 (BP Location: Right arm, Patient Position: Sitting, Cuff Size: Adult Regular)  Pulse 78  Temp 97.6  F (36.4  C) (Oral)  Resp 18  Wt 68.9 kg (152 lb)  SpO2 97%  BMI 25.29 kg/m2  Wt Readings from Last 10 Encounters:   05/30/18 68.9 kg (152 lb)   05/14/18 68.7 kg (151 lb 8 oz)   05/14/18 68.7 kg (151 lb 8 oz)   05/08/18 68 kg (150 lb)   05/04/18 69.4 kg (153 lb)   04/30/18 69.9 kg (154 lb)   04/21/18 74.8 kg (164 lb 12.8 oz)   04/02/18 73.1 kg (161 lb 2.5 oz)   04/02/18 72.4 kg (159 lb 11.2 oz)   03/19/18 70.9 kg (156 lb 3.2 oz)     Constitutional: Well-appearing female in no acute distress.  Eyes: EOMI, PERRL. No scleral icterus.  ENT: Oral mucosa is moist without lesions or thrush.  Lymphatic: Neck is supple without cervical or supraclavicular lymphadenopathy.   Cardiovascular: Regular rate and rhythm. No murmurs, gallops, or rubs. No peripheral edema.  Respiratory: Clear to auscultation bilaterally. No wheezes or crackles. Clear with coughing.   Gastrointestinal: Bowel sounds present. Abdomen soft, mildly tender to palpation throughout. No palpable hepatosplenomegaly or masses.   Neurologic: Cranial nerves II through XII are grossly intact.  Skin: No rashes, petechiae, or bruising noted on exposed skin.    Laboratory Data:   5/30/2018 06:38   Sodium 138   Potassium 4.3   Chloride 104   Carbon Dioxide 27   Urea Nitrogen 16   Creatinine 0.63   GFR Estimate >90   GFR Estimate If Black >90   Calcium 9.3   Anion Gap 6   Albumin 3.5   Protein Total 7.1   Bilirubin Total 0.3   Alkaline Phosphatase 126   ALT 26   AST 25   Hemoglobin A1C 7.8 (H)   Glucose 297 (H)   WBC 6.9   Hemoglobin 12.2   Hematocrit 38.0   Platelet Count 384   RBC Count 3.96   MCV 96   MCH 30.8   MCHC 32.1   RDW 13.3    Diff Method Automated Method   % Neutrophils 36.0   % Lymphocytes 44.3   % Monocytes 13.5   % Eosinophils 4.9   % Basophils 1.2   % Immature Granulocytes 0.1   Nucleated RBCs 0   Absolute Neutrophil 2.5   Absolute Lymphocytes 3.1   Absolute Monocytes 0.9   Absolute Eosinophils 0.3   Absolute Basophils 0.1   Abs Immature Granulocytes 0.0   Absolute Nucleated RBC 0.0       Assessment and Plan:  1. Pancreatic cancer  S/p 4 cycles of neoadjuvant  FOLFIRINOX with good response so she was able to undergo distal pancreatectomy and splenectomy. Her pathology showed complete pathologic response. Plan to start adjuvant chemotherapy with gemcitabine and xeloda and give 4 months of treatment followed by surveillance. She is feeling well today to proceed with treatment. Reviewed common side effects with treatment including myelosuppression, fatigue, mild GI side effects, hand-foot syndrome. Follow-up in 2 weeks prior to day 15.     2. Hyperglycemia: Non-fasting glucose today is 297. Checked Hgb A1c today resulting at 7.8. We use 12 mg of dexamethasone with gemcitabine infusions so will set up with endocrinology ASAP for management.     3. Rectal irritation: Start daily docusate to keep stools soft. If no improvement, will send to Colorectal.     Kellie Nobles PA-C  [facility]  [address], [place] [id number] [phone]

## 2018-05-30 NOTE — MR AVS SNAPSHOT
After Visit Summary   5/30/2018    Kitty Bangura    MRN: [id number]           Patient Information     Date Of Birth          1960        Visit Information        Provider Department      5/30/2018 7:00 AM UC 10 ATC;  ONCOLOGY INFUSION [facility]        Today's Diagnoses     Pancreatic adenocarcinoma (H)    -  1      Care Instructions    Contact Numbers    [facility] [place] [place] Main Line: [phone]  [facility] [place] [place] Triage and after hours / weekends / holidays:  [phone]      Please call the triage or after hours line if you experience a temperature greater than or equal to 100.5, shaking chills, have uncontrolled nausea, vomiting and/or diarrhea, dizziness, shortness of breath, chest pain, bleeding, unexplained bruising, or if you have any other new/concerning symptoms, questions or concerns.      If you are having any concerning symptoms or wish to speak to a provider before your next infusion visit, please call your care coordinator or triage to notify them so we can adequately serve you.     If you need a refill on a narcotic prescription or other medication, please call before your infusion appointment.             May 2018   [place]ang Monday Tuesday Wednesday Thursday Friday Saturday             1     2     3     4     [facility] NEW    8:45 AM   (60 min.)   Brandyn Snow MD   [facility] 5       6     7     8     Admission    7:16 AM   González Metz MD   [facility], [place], Endoscopy   (Discharge: 5/8/2018)     COMBINED ESOPHAGOSCOPY, GASTROSCOPY, DUODENOSCOPY (EGD)    9:00 AM   González Metz MD   [facility] 9     10     11     12       13     14     [facility]    7:00 AM   (15 min.)    [facility]RAW   [facility] Lab Draw     [facility] RETURN    7:30 AM   (30 min.)   Jovany Monk MD   [facility] RETURN    8:45 AM   (30 min.)   Ja Byrd MD   [facility] 15     16     17     18     19       20      21     22     LEVEL 0    9:00 AM   (30 min.)   ROOM 7 [facility] Cancer Infusion     CT CHEST/ABDOMEN/PELVIS W    9:45 AM   (30 min.)   [place]CT1   [facility] CT 23     24     25     26       27     28     29     30     [facility] D[facility]    6:30 AM   (15 min.)    [facility] DRAW   [facility] Draw     [facility] RETURN    6:45 AM   (50 min.)   Kellie Nobles PA-C [facility]     [facility] ONC INFUSION 360    7:00 AM   (360 min.)   UC ONCOLOGY INFUSION   [facility] 31 June 2018 Sunday Monday Tuesday Wednesday Thursday Friday Saturday                            1     2       3     4     5     [facility] DRAW    8:00 AM   (15 min.)   [facility] DRAW   [facility] Draw     [facility] ONC INFUSION 360    8:30 AM   (360 min.)    ONCOLOGY INFUSION   [facility] 6     7     8     9       10     11     12     [facility] DRAW    8:00 AM   (15 min.)    [facility] DRAW   [facility] Draw     [facility] RETURN    8:25 AM   (50 min.)   Kellie Nobles PA-C [facility]     [facility] ONC INFUSION 60    9:30 AM   (60 min.)    ONCOLOGY INFUSION   [facility] 13     14     15     16       17     18     19     20     21     22     23       24     25     26     [facility] DRAW    6:30 AM   (15 min.)    [facility] LAB DRAW   [facility] Draw     [facility] RETURN    6:45 AM   (50 min.)   Kellie Nobles PA-C [facility]     [facility] ONC INFUSION 60    8:30 AM   (60 min.)    ONCOLOGY INFUSION   [facility] 27     28     29     30                 Recent Results (from the past 24 hour(s))   CBC with platelets differential    Collection Time: 05/30/18  6:38 AM   Result Value Ref Range    WBC 6.9 4.0 - 11.0 10e9/L    RBC Count 3.96 3.8 - 5.2 10e12/L    Hemoglobin 12.2 11.7 - 15.7 g/dL    Hematocrit 38.0 35.0 - 47.0 %    MCV 96 78 -  100 fl    MCH 30.8 26.5 - 33.0 pg    MCHC 32.1 31.5 - 36.5 g/dL    RDW 13.3 10.0 - 15.0 %    Platelet Count 384 150 - 450 10e9/L    Diff Method Automated Method     % Neutrophils 36.0 %    % Lymphocytes 44.3 %    % Monocytes 13.5 %    % Eosinophils 4.9 %    % Basophils 1.2 %    % Immature Granulocytes 0.1 %    Nucleated RBCs 0 0 /100    Absolute Neutrophil 2.5 1.6 - 8.3 10e9/L    Absolute Lymphocytes 3.1 0.8 - 5.3 10e9/L    Absolute Monocytes 0.9 0.0 - 1.3 10e9/L    Absolute Eosinophils 0.3 0.0 - 0.7 10e9/L    Absolute Basophils 0.1 0.0 - 0.2 10e9/L    Abs Immature Granulocytes 0.0 0 - 0.4 10e9/L    Absolute Nucleated RBC 0.0    Comprehensive metabolic panel    Collection Time: 05/30/18  6:38 AM   Result Value Ref Range    Sodium 138 133 - 144 mmol/L    Potassium 4.3 3.4 - 5.3 mmol/L    Chloride 104 94 - 109 mmol/L    Carbon Dioxide 27 20 - 32 mmol/L    Anion Gap 6 3 - 14 mmol/L    Glucose 297 (H) 70 - 99 mg/dL    Urea Nitrogen 16 7 - 30 mg/dL    Creatinine 0.63 0.52 - 1.04 mg/dL    GFR Estimate >90 >60 mL/min/1.7m2    GFR Estimate If Black >90 >60 mL/min/1.7m2    Calcium 9.3 8.5 - 10.1 mg/dL    Bilirubin Total 0.3 0.2 - 1.3 mg/dL    Albumin 3.5 3.4 - 5.0 g/dL    Protein Total 7.1 6.8 - 8.8 g/dL    Alkaline Phosphatase 126 40 - 150 U/L    ALT 26 0 - 50 U/L    AST 25 0 - 45 U/L   Hemoglobin A1c    Collection Time: 05/30/18  6:38 AM   Result Value Ref Range    Hemoglobin A1C 7.8 (H) 0 - 5.6 %                 Follow-ups after your visit        Your next 10 appointments already scheduled     Jun 05, 2018  8:00 AM T   [facility] Draw with  [facility] LAB DRAW   [facility] Lab Draw ([facility])    [address]  [address] [place], [place] [address]   [phone]            Jun 05, 2018  8:30 AM CDT   Infusion 360 with UC ONCOLOGY INFUSION, UC 20 ATC   [facility]    [address]  [address]  [facility] [address]    [phone]            Jun 12, 2018  8:00 AM CDT   [facility] Lab Draw with UC [facility]ON[facility] LAB DRAW   [facility] Lab Draw ([facility])    [address] [place] [address]  [address]  [facility] [address]   [phone]            Jun 12, 2018  8:40 AM CDT   (Arrive by 8:25 AM)   Return Visit with FORREST Ackerman [facility] ([facility])    [address] [place] [address]  [address]  [facility] [address]   [phone]            Jun 12, 2018  9:30 AM CDT   Infusion 60 with UC ONCOLOGY INFUSION, UC 21 ATC   [facility] Cancer [facility] ([facility])    [address] [place] [address]  [address]  [facility] [address]   [phone]            Jun 26, 2018  6:30 AM CDT   [facility] Lab Draw with UC MASONIC LAB DRAW   [facility] Draw ([facility])    [address] [place] [address]  [address]  [facility] [address]   [phone]            Jun 26, 2018  7:00 AM CDT   (Arrive by 6:45 AM)   Return Visit with FORREST Ackerman [facility] ([facility])    [address] [place] [address]  [address]  [facility] [address]   [phone]            Jun 26, 2018  8:30 AM CDT   Infusion 60 with UC ONCOLOGY INFUSION   [facility] ([facility])    [address] [place] [address]  [address]  [facility] [address]   [phone]              Who to contact     If you have questions or need follow up information about today's clinic visit or your schedule please contact [facility] directly at [phone].  Normal or non-critical lab and imaging results will be communicated to you by MyChart, letter or phone within 4 business days after the clinic has received the results. If you do not hear from us within 7 days, please contact the clinic through MyChart or phone. If you have a critical or abnormal lab result, we  will notify you by phone as soon as possible.  Submit refill requests through [email or web address] or call your pharmacy and they will forward the refill request to us. Please allow 3 business days for your refill to be completed.          Additional Information About Your Visit        [employer] Information     [email or web address] gives you secure access to your electronic health record. If you see a primary care provider, you can also send messages to your care team and make appointments. If you have questions, please call your primary care clinic.  If you do not have a primary care provider, please call [phone] and they will assist you.        Care EveryWhere ID     This is your Care EveryWhere ID. This could be used by other organizations to access your [facility] medical records  [id number]         Blood Pressure from Last 3 Encounters:   05/30/18 (!) 89/63   05/30/18 93/62   05/14/18 101/72    Weight from Last 3 Encounters:   05/30/18 68.9 kg (152 lb)   05/14/18 68.7 kg (151 lb 8 oz)   05/14/18 68.7 kg (151 lb 8 oz)              We Performed the Following     CBC with platelets differential     Comprehensive metabolic panel     Hemoglobin A1c          Today's Medication Changes          These changes are accurate as of 5/30/18  9:30 AM.  If you have any questions, ask your nurse or doctor.               Start taking these medicines.        Dose/Directions    capecitabine 500 MG tablet CHEMO   Commonly known as:  XELODA   Used for:  Pancreatic adenocarcinoma (H)        Dose:  830 mg/m2   Take 3 tablets (1,500 mg) by mouth 2 times daily for 21 days Days 1 through 21, then 7 days off. Take within 30 mins after a meal.   Quantity:  126 tablet   Refills:  0       LORazepam 0.5 MG tablet   Commonly known as:  ATIVAN   Used for:  Pancreatic adenocarcinoma (H)        Dose:  0.5 mg   Take 1 tablet (0.5 mg) by mouth every 4 hours as needed (Anxiety, Nausea/Vomiting or Sleep)   Quantity:  30 tablet   Refills:  2       prochlorperazine 10 MG  tablet   Commonly known as:  COMPAZINE   Used for:  Pancreatic adenocarcinoma (H)        Dose:  10 mg   Take 1 tablet (10 mg) by mouth every 6 hours as needed (Nausea/Vomiting)   Quantity:  30 tablet   Refills:  2            Where to get your medicines      These medications were sent to [place], [place] - [address]  [address], [facility] [address]    Hours:  TRANSPLANT PHONE NUMBER [phone] Phone:  [phone]     capecitabine 500 MG tablet CHEMO    prochlorperazine 10 MG tablet         Some of these will need a paper prescription and others can be bought over the counter.  Ask your nurse if you have questions.     Bring a paper prescription for each of these medications     LORazepam 0.5 MG tablet                Primary Care Provider Office Phone # Fax #    Solange Mcgill [phone] [address] [address]        Equal Access to Services     CHASE DE GUZMAN AH: Hadii zaheer guerrero hadasho Soomaali, waaxda luqadaha, qaybta kaalmada adeegyada, re yip. So [facility] [phone].    ATENCIÓN: Si angelicala español, tiene a quiros disposición servicios gratuitos de asistencia lingüística. Llame al [phone].    We comply with applicable federal civil rights laws and [place] laws. We do not discriminate on the basis of race, color, national origin, age, disability, sex, sexual orientation, or gender identity.            Thank you!     Thank you for choosing [facility]  for your care. Our goal is always to provide you with excellent care. Hearing back from our patients is one way we can continue to improve our services. Please take a few minutes to complete the written survey that you may receive in the mail after your visit with us. Thank you!             Your Updated Medication List - Protect others around you: Learn how to safely use, store and throw away your  medicines at www.disposemymeds.org.          This list is accurate as of 5/30/18  9:30 AM.  Always use your most recent med list.                   Brand Name Dispense Instructions for use Diagnosis    acetaminophen 500 MG tablet    TYLENOL    100 tablet    Take 2 tablets (1,000 mg) by mouth every 8 hours    Acute post-operative pain       amylase-lipase-protease 53758-04124 units Cpep per EC capsule    CREON    450 capsule    Take 2-3 with meals / 1-2 with snacks, up to 15 per day.    Necrotizing pancreatitis       capecitabine 500 MG tablet CHEMO    XELODA    126 tablet    Take 3 tablets (1,500 mg) by mouth 2 times daily for 21 days Days 1 through 21, then 7 days off. Take within 30 mins after a meal.    Pancreatic adenocarcinoma (H)       LORazepam 0.5 MG tablet    ATIVAN    30 tablet    Take 1 tablet (0.5 mg) by mouth every 4 hours as needed (Anxiety, Nausea/Vomiting or Sleep)    Pancreatic adenocarcinoma (H)       ondansetron 8 MG tablet    ZOFRAN    30 tablet    Take 1 tablet (8 mg) by mouth every 8 hours as needed for nausea    Pancreatic adenocarcinoma (H)       pantoprazole 40 MG EC tablet    PROTONIX    30 tablet    Take 1 tablet (40 mg) by mouth every morning    Pancreatic adenocarcinoma (H)       polyethylene glycol Packet    MIRALAX/GLYCOLAX    30 packet    Take 17 g by mouth daily    Drug-induced constipation       prochlorperazine 10 MG tablet    COMPAZINE    30 tablet    Take 1 tablet (10 mg) by mouth every 6 hours as needed (Nausea/Vomiting)    Pancreatic adenocarcinoma (H)

## 2018-05-30 NOTE — MR AVS SNAPSHOT
After Visit Summary   5/30/2018    Kitty Bangura    MRN: [id number]           Patient Information     Date Of Birth          1960        Visit Information        Provider Department      5/30/2018 7:00 AM Kellie Nobles PA-C [facility]        Today's Diagnoses     Pancreatic adenocarcinoma (H)    -  1    Hyperglycemia        Diabetes mellitus without complication (H)        Other constipation           Follow-ups after your visit        Additional Services     ENDOCRINOLOGY ADULT REFERRAL       Your provider has referred you to: [facility]: Endocrinology and Diabetes Clinic [facility] [phone]   [email or web address]      Please be aware that coverage of these services is subject to the terms and limitations of your health insurance plan.  Call member services at your health plan with any benefit or coverage questions.      Please bring the following to your appointment:    >>   Any x-rays, CTs or MRIs which have been performed.  Contact the facility where they were done to arrange for  prior to your scheduled appointment.    >>   List of current medications   >>   This referral request   >>   Any documents/labs given to you for this referral                  Your next 10 appointments already scheduled     Jun 05, 2018  8:00 AM CDT   [facility] Lab Draw with  MASONIC LAB DRAW   [facility] ([facility])    [address] [place] [address]  [address] [place] [id number] [phone]            Jun 05, 2018  8:30 AM CDT   Infusion 360 with UC ONCOLOGY INFUSION, UC 20 ATC   [facility] ([facility])    [address] [place] [address]  [address] [place] [id number] [phone]            Jun 12, 2018  8:00 AM CDT   Masonic Lab Draw with UC MASONIC LAB DRAW   [facility] ([facility])    [address] [place] [address]  [address]  [address]  [facility] [address]   [phone]            Jun 12, 2018  8:40 AM CDT   (Arrive by 8:25 AM)   Return Visit with Kellie Nobles PA-C   [facility] ([facility])    [address]  [address]  [facility] [address]   [phone]            Jun 12, 2018  9:30 AM CDT   Infusion 60 with UC ONCOLOGY INFUSION, UC 21 ATC   [facility] ([facility])    [address] [place] [address]  [address]  [facility] [address]   [phone]            Jun 26, 2018  6:30 AM CDT   Masonic Lab Draw with  MASONIC LAB DRAW   [facility] Lab Draw ([facility])    [address] [place] [address]  [address]  [facility] [address]   [phone]            Jun 26, 2018  7:00 AM CDT   (Arrive by 6:45 AM)   Return Visit with Kellie Nobles PA-C   [facility] ([facility])    [address] [place] [address]  [address]  [facility] [address]   [phone]            Jun 26, 2018  8:30 AM CDT   Infusion 60 with UC ONCOLOGY INFUSION   [facility] ([facility])    [address] [place] [address]  [address]  [facility] [address]   [phone]              Who to contact     If you have questions or need follow up information about today's clinic visit or your schedule please contact [facility] directly at [phone].  Normal or non-critical lab and imaging results will be communicated to you by MyChart, letter or phone within 4 business days after the clinic has received the results. If you do not hear from us within 7 days, please contact the clinic through MyChart or phone. If you have a critical or abnormal lab result, we will notify you by phone as soon as possible.  Submit refill requests through Webbyn[employer] or call your pharmacy and they will forward the refill request to us. Please allow 3 business  days for your refill to be completed.          Additional Information About Your Visit        Sendmeboxhart Information     EQO gives you secure access to your electronic health record. If you see a primary care provider, you can also send messages to your care team and make appointments. If you have questions, please call your primary care clinic.  If you do not have a primary care provider, please call [phone] and they will assist you.        Care EveryWhere ID     This is your Care EveryWhere ID. This could be used by other organizations to access your [facility] medical records  [id number]        Your Vitals Were     Pulse Temperature Respirations Pulse Oximetry BMI (Body Mass Index)       78 97.6  F (36.4  C) (Oral) 18 97% 25.29 kg/m2        Blood Pressure from Last 3 Encounters:   05/30/18 (!) 89/63   05/30/18 93/62   05/14/18 101/72    Weight from Last 3 Encounters:   05/30/18 68.9 kg (152 lb)   05/14/18 68.7 kg (151 lb 8 oz)   05/14/18 68.7 kg (151 lb 8 oz)              We Performed the Following     ENDOCRINOLOGY ADULT REFERRAL          Today's Medication Changes          These changes are accurate as of 5/30/18  2:54 PM.  If you have any questions, ask your nurse or doctor.               Start taking these medicines.        Dose/Directions    capecitabine 500 MG tablet CHEMO   Commonly known as:  XELODA   Used for:  Pancreatic adenocarcinoma (H)        Dose:  830 mg/m2   Take 3 tablets (1,500 mg) by mouth 2 times daily for 21 days Days 1 through 21, then 7 days off. Take within 30 mins after a meal.   Quantity:  126 tablet   Refills:  0       docusate sodium 100 MG capsule   Commonly known as:  COLACE   Used for:  Other constipation   Started by:  Kellie Nobles PA-C        Dose:  100 mg   Take 1 capsule (100 mg) by mouth daily   Quantity:  60 capsule   Refills:  1       LORazepam 0.5 MG tablet   Commonly known as:  ATIVAN   Used for:  Pancreatic adenocarcinoma (H)        Dose:  0.5 mg   Take 1  tablet (0.5 mg) by mouth every 4 hours as needed (Anxiety, Nausea/Vomiting or Sleep)   Quantity:  30 tablet   Refills:  2       prochlorperazine 10 MG tablet   Commonly known as:  COMPAZINE   Used for:  Pancreatic adenocarcinoma (H)        Dose:  10 mg   Take 1 tablet (10 mg) by mouth every 6 hours as needed (Nausea/Vomiting)   Quantity:  30 tablet   Refills:  2            Where to get your medicines      These medications were sent to [facility] [address]  [address] [address]    Hours:  TRANSPLANT PHONE NUMBER [phone] Phone:  [phone]     capecitabine 500 MG tablet CHEMO    prochlorperazine 10 MG tablet         These medications were sent to [facility] [address] [place] [address] AT [address]  [address] [facility] [address]     Phone:  [phone]     docusate sodium 100 MG capsule         Some of these will need a paper prescription and others can be bought over the counter.  Ask your nurse if you have questions.     Bring a paper prescription for each of these medications     LORazepam 0.5 MG tablet                Primary Care Provider Office Phone # Fax #    Josenargis Julito Mcgill [phone] [address] [facility] [address]        Equal Access to Services     CHASE DE GUZMAN AH: Hadjuanita chino Sopaulo, waaxda luqadaha, qaybta kaalmada marty, re yip. So [facility] [phone].    ATENCIÓN: Si habla español, tiene a quiros disposición servicios gratuitos de asistencia lingüística. Macy navarrete [phone].    We comply with applicable federal civil rights laws and [place] laws. We do not discriminate on the basis of race, color, national origin, age, disability, sex, sexual orientation, or gender identity.            Thank you!     Thank you for choosing [facility]  for your  care. Our goal is always to provide you with excellent care. Hearing back from our patients is one way we can continue to improve our services. Please take a few minutes to complete the written survey that you may receive in the mail after your visit with us. Thank you!             Your Updated Medication List - Protect others around you: Learn how to safely use, store and throw away your medicines at www.disposemymeds.org.          This list is accurate as of 5/30/18  2:54 PM.  Always use your most recent med list.                   Brand Name Dispense Instructions for use Diagnosis    acetaminophen 500 MG tablet    TYLENOL    100 tablet    Take 2 tablets (1,000 mg) by mouth every 8 hours    Acute post-operative pain       amylase-lipase-protease 25669-25398 units Cpep per EC capsule    CREON    450 capsule    Take 2-3 with meals / 1-2 with snacks, up to 15 per day.    Necrotizing pancreatitis       capecitabine 500 MG tablet CHEMO    XELODA    126 tablet    Take 3 tablets (1,500 mg) by mouth 2 times daily for 21 days Days 1 through 21, then 7 days off. Take within 30 mins after a meal.    Pancreatic adenocarcinoma (H)       docusate sodium 100 MG capsule    COLACE    60 capsule    Take 1 capsule (100 mg) by mouth daily    Other constipation       LORazepam 0.5 MG tablet    ATIVAN    30 tablet    Take 1 tablet (0.5 mg) by mouth every 4 hours as needed (Anxiety, Nausea/Vomiting or Sleep)    Pancreatic adenocarcinoma (H)       ondansetron 8 MG tablet    ZOFRAN    30 tablet    Take 1 tablet (8 mg) by mouth every 8 hours as needed for nausea    Pancreatic adenocarcinoma (H)       pantoprazole 40 MG EC tablet    PROTONIX    30 tablet    Take 1 tablet (40 mg) by mouth every morning    Pancreatic adenocarcinoma (H)       polyethylene glycol Packet    MIRALAX/GLYCOLAX    30 packet    Take 17 g by mouth daily    Drug-induced constipation       prochlorperazine 10 MG tablet    COMPAZINE    30 tablet    Take 1 tablet (10 mg)  by mouth every 6 hours as needed (Nausea/Vomiting)    Pancreatic adenocarcinoma (H)

## 2018-05-30 NOTE — PROGRESS NOTES
Infusion Nursing Note:  Kitty Bangura presents today for Cycle 1 Day 1 Gemzar/Xeloda.    Patient seen by provider today: Yes: JALIL Nobles PAC   present during visit today: Not Applicable.    Note: Pt is starting a new regimen today; Gemzar/Xeloda. Drug teaching done previously and reinforced today by provider and infusion RN. Pt offered written information, but she states she already has at home. Pt educated by pharmacist staff on Xeloda. Pt verbalized understanding of drugs and new schedule. Pt encouraged to call with any questions or concerns. Pt given appropriate phone numbers.     Intravenous Access:  Implanted Port.    Treatment Conditions:  Lab Results   Component Value Date    HGB 12.2 05/30/2018     Lab Results   Component Value Date    WBC 6.9 05/30/2018      Lab Results   Component Value Date    ANEU 2.5 05/30/2018     Lab Results   Component Value Date     05/30/2018      Lab Results   Component Value Date     05/30/2018                   Lab Results   Component Value Date    POTASSIUM 4.3 05/30/2018           Lab Results   Component Value Date    MAG 2.1 04/21/2018            Lab Results   Component Value Date    CR 0.63 05/30/2018                   Lab Results   Component Value Date    ILIANA 9.3 05/30/2018                Lab Results   Component Value Date    BILITOTAL 0.3 05/30/2018           Lab Results   Component Value Date    ALBUMIN 3.5 05/30/2018                    Lab Results   Component Value Date    ALT 26 05/30/2018           Lab Results   Component Value Date    AST 25 05/30/2018       Results reviewed, labs MET treatment parameters, ok to proceed with treatment.      Post Infusion Assessment:  Patient tolerated infusion without incident.  Blood return noted pre and post infusion.  No evidence of extravasations.  Access discontinued per protocol.    Discharge Plan:   Prescription refills given for Xeloda, ativan, compazine.  Discharge instructions reviewed with:  Patient and Family.  Copy of AVS reviewed with patient and/or family.  Patient will return 6/5/18 for next appointment for D8 Chemo with labs prior.  Patient discharged in stable condition accompanied by: self and .  Departure Mode: Ambulatory.    Emmanuelle Munoz RN

## 2018-05-30 NOTE — NURSING NOTE
Chief Complaint   Patient presents with     Port Draw     Labs drawn via port by RN. Line flushed and hep locked. VS taken - hypotensive, asymptomatic. Provider informed.      Vicki Kellogg RN

## 2018-05-31 ENCOUNTER — TELEPHONE (OUTPATIENT)
Dept: ENDOCRINOLOGY | Facility: CLINIC | Age: 58
End: 2018-05-31

## 2018-05-31 NOTE — TELEPHONE ENCOUNTER
----- Message from Vani WILSON Link sent at 5/31/2018  8:41 AM CDT -----  Regarding: RE: NEW referral  Good morning,    Since the referring clinic is trying to get her in ASAP it should be sent to Silvana for triage so she can make the call on who/when we see her. (I'm cc'ing her to this message.)    Dr Pina, please see below note from referring clinic. The patient is currently schedule 8/3/18 with Alexandro.      Thanks,  Vani  ----- Message -----     From: Lora Contreras RN     Sent: 5/30/2018   4:00 PM       To: Clinic [id number]  Subject: NEW                                              DO any of the fellows or on call have an opening ? Wasn't sure if this needed to go to Dr Pina  For time or if we just schedule NEW  ----- Message -----     From: Kellie Nobles PA-C     Sent: 5/30/2018   2:39 PM       To: Catina Pablo, RN, #    Hi Ladies,    Wondering if you would be able to help me get this patient into the endocrine clinic? She has pancreatic cancer now s/p distal pancreatectomy and has had rising blood sugars past few weeks. I checked Hgb A1c today and it was 7.8. We started chemo today and are going to be using dexamethasone, so I want to get her connected with you guys ideally in the next week or so.    Thanks,  Kellie

## 2018-05-31 NOTE — TELEPHONE ENCOUNTER
"   amylase-lipase-protease (CREON) [id number] UNITS CPEP per EC capsule  Last Written Prescription Date:  11/20/17  Last Fill Quantity: 450,   # refills: 6  Last Office Visit : 11/27/17  Future Office visit: none    Routing  Because:  CREON:   Fax received from [facility] that reads, \"This drug is on backorder. Patient has enough for the rest of the week. We are not sure when we will have this medication. Patient is aware          "

## 2018-05-31 NOTE — TELEPHONE ENCOUNTER
To schedulers : please schedule with consult service (or open CICI) within the week.    Marika Pina MD  Endocrine triage

## 2018-05-31 NOTE — TELEPHONE ENCOUNTER
----- Message from Vani WILSON Link sent at 5/31/2018  8:41 AM CDT -----  Regarding: RE: NEW referral  Good morning,    Since the referring clinic is trying to get her in ASAP it should be sent to Sil[facility]ana for triage so she can make the call on who/when we see her. (I'm cc'ing her to this message.)    Dr Pina, please see below note from referring clinic. The patient is currently schedule 8/3/18 with Alexandro.      Thanks,  Vani  ----- Message -----     From: Lora Contreras RN     Sent: 5/30/2018   4:00 PM       To: Clinic [id number]-[id number]-Ae  Subject: NEW                                              DO any of the fellows or on call have an opening ? Wasn't sure if this needed to go to Dr Pina  For time or if we just schedule NEW  ----- Message -----     From: Kellie Nobles PA-C     Sent: 5/30/2018   2:39 PM       To: Catina Pablo, RN, #    Hi Ladies,    Wondering if you would be able to help me get this patient into the endocrine clinic? She has pancreatic cancer now s/p distal pancreatectomy and has had rising blood sugars past few weeks. I checked Hgb A1c today and it was 7.8. We started chemo today and are going to be using dexamethasone, so I want to get her connected with you guys ideally in the next week or so.    Thanks,  Kellie

## 2018-06-05 ENCOUNTER — INFUSION THERAPY VISIT (OUTPATIENT)
Dept: ONCOLOGY | Facility: CLINIC | Age: 58
End: 2018-06-05
Attending: PHYSICIAN ASSISTANT
Payer: COMMERCIAL

## 2018-06-05 ENCOUNTER — TELEPHONE (OUTPATIENT)
Dept: ENDOCRINOLOGY | Facility: CLINIC | Age: 58
End: 2018-06-05

## 2018-06-05 ENCOUNTER — APPOINTMENT (OUTPATIENT)
Dept: LAB | Facility: CLINIC | Age: 58
End: 2018-06-05
Attending: INTERNAL MEDICINE
Payer: COMMERCIAL

## 2018-06-05 ENCOUNTER — OFFICE VISIT (OUTPATIENT)
Dept: ENDOCRINOLOGY | Facility: CLINIC | Age: 58
End: 2018-06-05
Payer: COMMERCIAL

## 2018-06-05 VITALS
SYSTOLIC BLOOD PRESSURE: 104 MMHG | HEART RATE: 76 BPM | WEIGHT: 149.25 LBS | BODY MASS INDEX: 24.84 KG/M2 | DIASTOLIC BLOOD PRESSURE: 73 MMHG

## 2018-06-05 VITALS
TEMPERATURE: 98.2 F | SYSTOLIC BLOOD PRESSURE: 104 MMHG | HEART RATE: 76 BPM | WEIGHT: 149.2 LBS | OXYGEN SATURATION: 98 % | BODY MASS INDEX: 24.83 KG/M2 | RESPIRATION RATE: 16 BRPM | DIASTOLIC BLOOD PRESSURE: 73 MMHG

## 2018-06-05 DIAGNOSIS — C25.9 PANCREATIC ADENOCARCINOMA (H): ICD-10-CM

## 2018-06-05 DIAGNOSIS — R73.9 STEROID-INDUCED HYPERGLYCEMIA: ICD-10-CM

## 2018-06-05 DIAGNOSIS — T38.0X5A STEROID-INDUCED HYPERGLYCEMIA: ICD-10-CM

## 2018-06-05 DIAGNOSIS — Z79.899 ENCOUNTER FOR LONG-TERM (CURRENT) USE OF MEDICATIONS: ICD-10-CM

## 2018-06-05 DIAGNOSIS — Z79.4 TYPE 2 DIABETES MELLITUS WITH DIABETIC POLYNEUROPATHY, WITH LONG-TERM CURRENT USE OF INSULIN (H): Primary | ICD-10-CM

## 2018-06-05 DIAGNOSIS — E11.42 TYPE 2 DIABETES MELLITUS WITH DIABETIC POLYNEUROPATHY, WITH LONG-TERM CURRENT USE OF INSULIN (H): Primary | ICD-10-CM

## 2018-06-05 DIAGNOSIS — C25.9 PANCREATIC ADENOCARCINOMA (H): Primary | ICD-10-CM

## 2018-06-05 LAB
ALBUMIN SERPL-MCNC: 3.6 G/DL (ref 3.4–5)
ALP SERPL-CCNC: 132 U/L (ref 40–150)
ALT SERPL W P-5'-P-CCNC: 27 U/L (ref 0–50)
ANION GAP SERPL CALCULATED.3IONS-SCNC: 8 MMOL/L (ref 3–14)
AST SERPL W P-5'-P-CCNC: 17 U/L (ref 0–45)
BASOPHILS # BLD AUTO: 0.1 10E9/L (ref 0–0.2)
BASOPHILS NFR BLD AUTO: 1.1 %
BILIRUB SERPL-MCNC: 0.4 MG/DL (ref 0.2–1.3)
BUN SERPL-MCNC: 14 MG/DL (ref 7–30)
CALCIUM SERPL-MCNC: 9.2 MG/DL (ref 8.5–10.1)
CHLORIDE SERPL-SCNC: 100 MMOL/L (ref 94–109)
CO2 SERPL-SCNC: 26 MMOL/L (ref 20–32)
CREAT SERPL-MCNC: 0.58 MG/DL (ref 0.52–1.04)
DIFFERENTIAL METHOD BLD: ABNORMAL
EOSINOPHIL # BLD AUTO: 0.2 10E9/L (ref 0–0.7)
EOSINOPHIL NFR BLD AUTO: 4.3 %
ERYTHROCYTE [DISTWIDTH] IN BLOOD BY AUTOMATED COUNT: 12.8 % (ref 10–15)
GFR SERPL CREATININE-BSD FRML MDRD: >90 ML/MIN/1.7M2
GLUCOSE BLDC GLUCOMTR-MCNC: 380 MG/DL (ref 70–99)
GLUCOSE SERPL-MCNC: 382 MG/DL (ref 70–99)
HCT VFR BLD AUTO: 38.8 % (ref 35–47)
HGB BLD-MCNC: 12.6 G/DL (ref 11.7–15.7)
IMM GRANULOCYTES # BLD: 0 10E9/L (ref 0–0.4)
IMM GRANULOCYTES NFR BLD: 0.2 %
LYMPHOCYTES # BLD AUTO: 2.1 10E9/L (ref 0.8–5.3)
LYMPHOCYTES NFR BLD AUTO: 47.6 %
MCH RBC QN AUTO: 30.5 PG (ref 26.5–33)
MCHC RBC AUTO-ENTMCNC: 32.5 G/DL (ref 31.5–36.5)
MCV RBC AUTO: 94 FL (ref 78–100)
MONOCYTES # BLD AUTO: 0.2 10E9/L (ref 0–1.3)
MONOCYTES NFR BLD AUTO: 5.5 %
NEUTROPHILS # BLD AUTO: 1.8 10E9/L (ref 1.6–8.3)
NEUTROPHILS NFR BLD AUTO: 41.3 %
NRBC # BLD AUTO: 0 10*3/UL
NRBC BLD AUTO-RTO: 1 /100
PLATELET # BLD AUTO: 320 10E9/L (ref 150–450)
POTASSIUM SERPL-SCNC: 4.4 MMOL/L (ref 3.4–5.3)
PROT SERPL-MCNC: 7.5 G/DL (ref 6.8–8.8)
RBC # BLD AUTO: 4.13 10E12/L (ref 3.8–5.2)
SODIUM SERPL-SCNC: 134 MMOL/L (ref 133–144)
WBC # BLD AUTO: 4.4 10E9/L (ref 4–11)

## 2018-06-05 PROCEDURE — 85025 COMPLETE CBC W/AUTO DIFF WBC: CPT | Performed by: INTERNAL MEDICINE

## 2018-06-05 PROCEDURE — 96413 CHEMO IV INFUSION 1 HR: CPT

## 2018-06-05 PROCEDURE — 25000128 H RX IP 250 OP 636: Mod: ZF | Performed by: PHYSICIAN ASSISTANT

## 2018-06-05 PROCEDURE — 96367 TX/PROPH/DG ADDL SEQ IV INF: CPT

## 2018-06-05 PROCEDURE — 80053 COMPREHEN METABOLIC PANEL: CPT | Performed by: INTERNAL MEDICINE

## 2018-06-05 PROCEDURE — 25000128 H RX IP 250 OP 636: Mod: ZF | Performed by: INTERNAL MEDICINE

## 2018-06-05 RX ORDER — LANCETS 33 GAUGE
4 EACH MISCELLANEOUS DAILY
Qty: 100 EACH | Refills: 3 | Status: SHIPPED | OUTPATIENT
Start: 2018-06-05 | End: 2019-10-18

## 2018-06-05 RX ORDER — HEPARIN SODIUM (PORCINE) LOCK FLUSH IV SOLN 100 UNIT/ML 100 UNIT/ML
5 SOLUTION INTRAVENOUS ONCE
Status: COMPLETED | OUTPATIENT
Start: 2018-06-05 | End: 2018-06-05

## 2018-06-05 RX ADMIN — GEMCITABINE 1800 MG: 38 INJECTION, SOLUTION INTRAVENOUS at 09:38

## 2018-06-05 RX ADMIN — SODIUM CHLORIDE, PRESERVATIVE FREE 5 ML: 5 INJECTION INTRAVENOUS at 10:35

## 2018-06-05 RX ADMIN — DEXAMETHASONE SODIUM PHOSPHATE 12 MG: 10 INJECTION, SOLUTION INTRAMUSCULAR; INTRAVENOUS at 08:54

## 2018-06-05 RX ADMIN — SODIUM CHLORIDE 250 ML: 9 INJECTION, SOLUTION INTRAVENOUS at 08:54

## 2018-06-05 ASSESSMENT — PAIN SCALES - GENERAL
PAINLEVEL: MILD PAIN (2)
PAINLEVEL: MILD PAIN (2)

## 2018-06-05 NOTE — PATIENT INSTRUCTIONS
1. Take NPH 10 units twice daily on infusion days, 5 units on all other days.    2. On days of your infusion, check your blood sugar 4 times - fasting, before meals, and at bedtime.    3. On off days, check your sugar every morning.    4. If blood sugar is <70, take 15 g carbs and retest in 15 minutes.    5. If you are regularly getting blood sugars <100, please let Dr. Es Gallo know via BPe[facility] or call the clinic.    6. Return to clinic for diabetes educator appointment when able.    7. Return to clinic for for follow-up with a physician assistant. (next available appointment)      8. We will add you to Dr. Gallo's schedule for follow-up on July 9th at noon.

## 2018-06-05 NOTE — MR AVS SNAPSHOT
After Visit Summary   6/5/2018    Kitty Bangura    MRN: [id number]           Patient Information     Date Of Birth          1960        Visit Information        Provider Department      6/5/2018 9:00 AM Es Gallo MD  [facility]        Today's Diagnoses     Type 2 diabetes mellitus with diabetic polyneuropathy, with long-term current use of insulin (H)    -  1    Pancreatic adenocarcinoma (H)        Steroid-induced hyperglycemia          Care Instructions    1. Take NPH 10 units twice daily on infusion days, 5 units on all other days.    2. On days of your infusion, check your blood sugar 4 times - fasting, before meals, and at bedtime.    3. On off days, check your sugar every morning.    4. If blood sugar is <70, take 15 g carbs and retest in 15 minutes.    5. If you are regularly getting blood sugars <100, please let Dr. sE Gallo know via Text A Cab or call the clinic.    6. Return to clinic for diabetes educator appointment when able.    7. Return to clinic for for follow-up with a physician assistant. (next available appointment)      8. We will add you to Dr. Gallo's schedule for follow-up on July 9th at noon.           Follow-ups after your visit        Additional Services     ENDOCRINOLOGY ADULT REFERRAL       Your provider has referred you to: [facility]: Endocrinology and Diabetes Clinic [facility] [phone]   [email or web address]      Please be aware that coverage of these services is subject to the terms and limitations of your health insurance plan.  Call member services at your health plan with any benefit or coverage questions.      Please bring the following to your appointment:    >>   Any x-rays, CTs or MRIs which have been performed.  Contact the facility where they were done to arrange for  prior to your scheduled appointment.    >>   List of current medications   >>   This referral request    >>   Any documents/labs given to you for this referral                  Follow-up notes from your care team     Return in about 2 weeks (around 6/19/2018).      Your next 10 appointments already scheduled     Jun 26, 2018  6:30 AM CDT   [facility] Draw with [facility]   [facility]    [address]  [address]  [facility] [address]   [phone]            Jun 26, 2018  7:00 AM CDT   (Arrive by 6:45 AM)   Return Visit with Kellie Nobles PA-C   [facility] ([facility])    [address] [place] [address]  [address]  [facility] [address]   [phone]            Jun 26, 2018  8:30 AM CDT   Infusion 60 with [facility] ONCOLOGY INFUSION, UC 16 ATC   [facility] ([facility])    [address] [place] [address]  [address]  [facility] [address]   [phone]            Jul 02, 2018  9:00 AM CDT   [facility] Draw with [facility] DRAW   [facility] Draw ([facility])    [address]  [address]  [facility] [address]   [phone]            Jul 02, 2018  9:30 AM CDT   Infusion 60 with UC ONCOLOGY INFUSION, UC 21 ATC   [facility] ([facility])    [address] [place] [address]  [address]  [facility] [address]   [phone]            Jul 09, 2018  9:00 AM CDT   [facility] Draw with [facility] DRAW   [facility]    [address] [place] [address]  [address]  [facility] [address]   [phone]            Jul 09, 2018  9:30 AM CDT   Infusion 60 with UC ONCOLOGY INFUSION, UC 22 ATC   [facility] ([facility])    [address] [place] [address]  [address]  [facility] [address]   [phone]              Who to contact     Please call your clinic at [phone] to:    Ask questions about your  health    Make or cancel appointments    Discuss your medicines    Learn about your test results    Speak to your doctor            Additional Information About Your Visit        [employer] Information     [employer] gives you secure access to your electronic health record. If you see a primary care provider, you can also send messages to your care team and make appointments. If you have questions, please call your primary care clinic.  If you do not have a primary care provider, please call [phone] and they will assist you.      [employer] is an electronic gateway that provides easy, online access to your medical records. With [employer], you can request a clinic appointment, read your test results, renew a prescription or communicate with your care team.     To access your existing account, please contact your [facility] or call [phone] for assistance.        Care EveryWhere ID     This is your Care EveryWhere ID. This could be used by other organizations to access your [facility] medical records  [id number]        Your Vitals Were     Pulse BMI (Body Mass Index)                76 24.84 kg/m2           Blood Pressure from Last 3 Encounters:   06/12/18 106/65   06/05/18 104/73   06/05/18 104/73    Weight from Last 3 Encounters:   06/12/18 147 lb 14.4 oz (67.1 kg)   06/05/18 149 lb 4 oz (67.7 kg)   06/05/18 149 lb 3.2 oz (67.7 kg)              We Performed the Following     ENDOCRINOLOGY ADULT REFERRAL     Glucose by meter          Today's Medication Changes          These changes are accurate as of 6/5/18 11:59 PM.  If you have any questions, ask your nurse or doctor.               Start taking these medicines.        Dose/Directions    blood glucose monitoring test strip   Commonly known as:  ONETOUCH VERIO IQ   Used for:  Type 2 diabetes mellitus with diabetic polyneuropathy, with long-term current use of insulin (H)   Started by:  Es Gallo MD        Use to test blood  sugar 4 times daily or as directed.   Quantity:  100 each   Refills:  3       insulin  UNIT/ML injection   Commonly known as:  HumuLIN N/NovoLIN N   Used for:  Type 2 diabetes mellitus with diabetic polyneuropathy, with long-term current use of insulin (H)   Started by:  Es Gallo MD        Inject 10 units twice daily on infusion days, 5 units twice daily on all other days.   Quantity:  3 vial   Refills:  3       insulin syringes (disposable) U-100 0.3 ML Misc   Used for:  Type 2 diabetes mellitus with diabetic polyneuropathy, with long-term current use of insulin (H)   Started by:  Es Gallo MD        Inject 10 units twice daily on infusion days, 5 units twice daily all other days   Quantity:  1 each   Refills:  3       ONETOUCH DELICA LANCETS 33G Misc   Used for:  Type 2 diabetes mellitus with diabetic polyneuropathy, with long-term current use of insulin (H)   Started by:  Es Gallo MD        Dose:  4 lancet   4 lancets daily   Quantity:  100 each   Refills:  3            Where to get your medicines      These medications were sent to [place] - [address]  [address] [address]    Hours:  TRANSPLANT PHONE NUMBER [phone] Phone:  [phone]     blood glucose monitoring test strip    ONETOUCH DELICA LANCETS 33G Misc         These medications were sent to [address] [address]     Phone:  [phone]     insulin  UNIT/ML injection    insulin syringes (disposable) U-100 0.3 ML Misc                Primary Care Provider Office Phone # Fax #    Solange Mcgill [phone] [address] [facility] [address]        Equal Access to Services     CHASE DE GUZMAN AH: Hadii zaheer donovan Sopaulo, waaxda luqadaha, qaybta kaalben ferguson, re banerjee  kiya gerardo ah. So [facility] [phone].    ATENCIÓN: Si odessa vasquez, tiene a quiros disposición servicios gratuitos de asistencia lingüística. Macy navarrete [phone].    We comply with applicable federal civil rights laws and [place] laws. We do not discriminate on the basis of race, color, national origin, age, disability, sex, sexual orientation, or gender identity.            Thank you!     Thank you for choosing [facility] ENDOCRINOLOGY  for your care. Our goal is always to provide you with excellent care. Hearing back from our patients is one way we can continue to improve our services. Please take a few minutes to complete the written survey that you may receive in the mail after your visit with us. Thank you!             Your Updated Medication List - Protect others around you: Learn how to safely use, store and throw away your medicines at www.disposemymeds.org.          This list is accurate as of 6/5/18 11:59 PM.  Always use your most recent med list.                   Brand Name Dispense Instructions for use Diagnosis    acetaminophen 500 MG tablet    TYLENOL    100 tablet    Take 2 tablets (1,000 mg) by mouth every 8 hours    Acute post-operative pain       amylase-lipase-protease 44947-18452 units Cpep per EC capsule    CREON    450 capsule    Take 2-3 with meals / 1-2 with snacks, up to 15 per day.    Necrotizing pancreatitis       blood glucose monitoring test strip    ONETOUCH VERIO IQ    100 each    Use to test blood sugar 4 times daily or as directed.    Type 2 diabetes mellitus with diabetic polyneuropathy, with long-term current use of insulin (H)       capecitabine 500 MG tablet CHEMO    XELODA    126 tablet    Take 3 tablets (1,500 mg) by mouth 2 times daily for 21 days Days 1 through 21, then 7 days off. Take within 30 mins after a meal.    Pancreatic adenocarcinoma (H)       docusate sodium 100 MG capsule    COLACE    60 capsule    Take 1 capsule (100 mg) by mouth daily    Other constipation        insulin  UNIT/ML injection    HumuLIN N/NovoLIN N    3 vial    Inject 10 units twice daily on infusion days, 5 units twice daily on all other days.    Type 2 diabetes mellitus with diabetic polyneuropathy, with long-term current use of insulin (H)       insulin syringes (disposable) U-100 0.3 ML Misc     1 each    Inject 10 units twice daily on infusion days, 5 units twice daily all other days    Type 2 diabetes mellitus with diabetic polyneuropathy, with long-term current use of insulin (H)       LORazepam 0.5 MG tablet    ATIVAN    30 tablet    Take 1 tablet (0.5 mg) by mouth every 4 hours as needed (Anxiety, Nausea/Vomiting or Sleep)    Pancreatic adenocarcinoma (H)       ondansetron 8 MG tablet    ZOFRAN    30 tablet    Take 1 tablet (8 mg) by mouth every 8 hours as needed for nausea    Pancreatic adenocarcinoma (H)       ONETOUCH DELICA LANCETS 33G Misc     100 each    4 lancets daily    Type 2 diabetes mellitus with diabetic polyneuropathy, with long-term current use of insulin (H)       polyethylene glycol Packet    MIRALAX/GLYCOLAX    30 packet    Take 17 g by mouth daily    Drug-induced constipation       prochlorperazine 10 MG tablet    COMPAZINE    30 tablet    Take 1 tablet (10 mg) by mouth every 6 hours as needed (Nausea/Vomiting)    Pancreatic adenocarcinoma (H)

## 2018-06-05 NOTE — PROGRESS NOTES
Infusion Nursing Note:  Kitty Bangura presents today for Day 8 Cycle 1 Gemzar/Xeloda.    Patient seen by provider today: No   present during visit today: Not Applicable.    Note: Patient states she feels well overall. Patient endorses baseline dizziness, which she states is unchanged. Patient reports baseline constipation and is taking colace as needed. Reports 2/10 abdominal pain and declines intervention.    Denies any recent fevers/chills, shortness of breath, or new rashes/mouth sores.    Patient confirms she is taking her Xeloda as prescribed.    Patient seen by Endocrinology in infusion today (see provider note).    Intravenous Access:  Implanted Port.    Treatment Conditions:  Lab Results   Component Value Date    HGB 12.6 06/05/2018     Lab Results   Component Value Date    WBC 4.4 06/05/2018      Lab Results   Component Value Date    ANEU 1.8 06/05/2018     Lab Results   Component Value Date     06/05/2018      Lab Results   Component Value Date     05/30/2018                   Lab Results   Component Value Date    POTASSIUM 4.3 05/30/2018           Lab Results   Component Value Date    MAG 2.1 04/21/2018            Lab Results   Component Value Date    CR 0.63 05/30/2018                   Lab Results   Component Value Date    ILIANA 9.3 05/30/2018                Lab Results   Component Value Date    BILITOTAL 0.3 05/30/2018           Lab Results   Component Value Date    ALBUMIN 3.5 05/30/2018                    Lab Results   Component Value Date    ALT 26 05/30/2018           Lab Results   Component Value Date    AST 25 05/30/2018       Results reviewed, labs MET treatment parameters, ok to proceed with treatment.      Post Infusion Assessment:  Patient tolerated infusion without incident.  Blood return noted pre and post infusion.  Site patent and intact, free from redness, edema or discomfort.  No evidence of extravasations.  Access discontinued per protocol.    Discharge Plan:    Medication refills for blood glucose monitoring supplies and NPH insulin filled today (see Endocrinology note).  Discharge instructions reviewed with: Patient and Family.  Patient and/or family verbalized understanding of discharge instructions and all questions answered.  AVS to patient via [employer].  Patient will return 6/12/18 for next appointment.   Patient discharged in stable condition accompanied by: self and .  Departure Mode: Ambulatory.  Face to Face time: 0 minutes.    Mario Brown RN

## 2018-06-05 NOTE — PATIENT INSTRUCTIONS
Contact Numbers    [facility] Main Line: [phone]  [facility] Triage and after hours / weekends / holidays:  [phone]      Please call the triage or after hours line if you experience a temperature greater than or equal to 100.5, shaking chills, have uncontrolled nausea, vomiting and/or diarrhea, dizziness, shortness of breath, chest pain, bleeding, unexplained bruising, or if you have any other new/concerning symptoms, questions or concerns.      If you are having any concerning symptoms or wish to speak to a provider before your next infusion visit, please call your care coordinator or triage to notify them so we can adequately serve you.     If you need a refill on a narcotic prescription or other medication, please call before your infusion appointment.                   June 2018 Sunday Monday Tuesday Wednesday Thursday Friday Saturday                            1     2       3     4     5     [facility] D[facility]    8:00 AM   (15 min.)    [facility]     [facility] ONC INFUSION 360    8:30 AM   (360 min.)    [facility]USION   [facility]    8:45 AM   (60 min.)   Es Gallo MD   [facility] Endocrinology 6     7     8     9       10     11     12     [facility] DRAW    8:00 AM   (15 min.)    [facility]   [facility]     [facility] RETURN    8:25 AM   (50 min.)   Kellie Nobles PA-C   [facility] ONC INFUSION 60    9:30 AM   (60 min.)    ONCOLOGY INFUSION   [facility] 13     14     15     16       17     18     19     20     21     22     23       24     25     26     [facility] DRAW    6:30 AM   (15 min.)   [facility]   [facility]     [facility] RETURN    6:45 AM   (50 min.)   Kellie Nobles PA-C   [facility] 60    8:30 AM   (60 min.)    ONCOLOGY INFUSION   [facility]  27     28     29     30 July 2018 Sunday Monday Tuesday Wednesday Thursday Friday Saturday   1     2     [facility] LAB DRAW    8:00 AM   (15 min.)    [facility] LAB DRAW   [facility] Lab Draw     [facility] RETURN    8:30 AM   (30 min.)   Jovany Monk MD   [facility]    9:30 AM   (60 min.)   [facility] ONCOLOGY INFUSION   [facility] 3     4     5     6     7       8     9     [facility] LAB DRAW    7:15 AM   (15 min.)   [facility] DRAW   [facility] Lab Draw     P RETURN    7:35 AM   (50 min.)   Kellie Nobles PA-C   [facility] ONC INFUSION 60    9:30 AM   (60 min.)    ONCOLOGY INFUSION   [facility] 10     11     12     13     14       15     16     17     18     19     20     21       22     23     24     25     26     27     28       29     30     31                                      Lab Results:  Recent Results (from the past 12 hour(s))   CBC with platelets differential    Collection Time: 06/05/18  8:22 AM   Result Value Ref Range    WBC 4.4 4.0 - 11.0 10e9/L    RBC Count 4.13 3.8 - 5.2 10e12/L    Hemoglobin 12.6 11.7 - 15.7 g/dL    Hematocrit 38.8 35.0 - 47.0 %    MCV 94 78 - 100 fl    MCH 30.5 26.5 - 33.0 pg    MCHC 32.5 31.5 - 36.5 g/dL    RDW 12.8 10.0 - 15.0 %    Platelet Count 320 150 - 450 10e9/L    Diff Method Automated Method     % Neutrophils 41.3 %    % Lymphocytes 47.6 %    % Monocytes 5.5 %    % Eosinophils 4.3 %    % Basophils 1.1 %    % Immature Granulocytes 0.2 %    Nucleated RBCs 1 (H) 0 /100    Absolute Neutrophil 1.8 1.6 - 8.3 10e9/L    Absolute Lymphocytes 2.1 0.8 - 5.3 10e9/L    Absolute Monocytes 0.2 0.0 - 1.3 10e9/L    Absolute Eosinophils 0.2 0.0 - 0.7 10e9/L    Absolute Basophils 0.1 0.0 - 0.2 10e9/L    Abs Immature Granulocytes 0.0 0 - 0.4 10e9/L    Absolute Nucleated RBC 0.0    Comprehensive metabolic panel    Collection Time: 06/05/18  8:22  AM   Result Value Ref Range    Sodium 134 133 - 144 mmol/L    Potassium 4.4 3.4 - 5.3 mmol/L    Chloride 100 94 - 109 mmol/L    Carbon Dioxide 26 20 - 32 mmol/L    Anion Gap 8 3 - 14 mmol/L    Glucose 382 (H) 70 - 99 mg/dL    Urea Nitrogen 14 7 - 30 mg/dL    Creatinine 0.58 0.52 - 1.04 mg/dL    GFR Estimate >90 >60 mL/min/1.7m2    GFR Estimate If Black >90 >60 mL/min/1.7m2    Calcium 9.2 8.5 - 10.1 mg/dL    Bilirubin Total 0.4 0.2 - 1.3 mg/dL    Albumin 3.6 3.4 - 5.0 g/dL    Protein Total 7.5 6.8 - 8.8 g/dL    Alkaline Phosphatase 132 40 - 150 U/L    ALT 27 0 - 50 U/L    AST 17 0 - 45 U/L   Glucose by meter    Collection Time: 06/05/18  9:45 AM   Result Value Ref Range    Glucose 380 (H) 70 - 99 mg/dL     \[id number] Nio [id number]\

## 2018-06-05 NOTE — NURSING NOTE
"Chief Complaint   Patient presents with     Port Draw     Labs drawn from port by RN. Line flushed with saline and heparin. Vs taken and pt checked in for appt     Port accessed with 20g 3/4\" gripper needle by RN, labs collected, line flushed with saline and heparin.  Vitals taken. Pt checked in for appointment(s).    Hanna Hopkins RN  "

## 2018-06-05 NOTE — MR AVS SNAPSHOT
After Visit Summary   6/5/2018    Kitty Bangura    MRN: [id number]           Patient Information     Date Of Birth          1960        Visit Information        Provider Department      6/5/2018 8:30 AM UC 20 ATC;  ONCOLOGY INFUSION [facility]        Today's Diagnoses     Pancreatic adenocarcinoma (H)    -  1    Encounter for long-term (current) use of medications          Care Instructions    Contact Numbers    [facility] Main Line: [phone]  [facility] Triage and after hours / weekends / holidays:  [phone]      Please call the triage or after hours line if you experience a temperature greater than or equal to 100.5, shaking chills, have uncontrolled nausea, vomiting and/or diarrhea, dizziness, shortness of breath, chest pain, bleeding, unexplained bruising, or if you have any other new/concerning symptoms, questions or concerns.      If you are having any concerning symptoms or wish to speak to a provider before your next infusion visit, please call your care coordinator or triage to notify them so we can adequately serve you.     If you need a refill on a narcotic prescription or other medication, please call before your infusion appointment.                   June 2018 Sunday Monday Tuesday Wednesday Thursday Friday Saturday                            1     2       3     4     5     [facility] LAB DRAW    8:00 AM   (15 min.)    [facility] LAB DRAW   [facility] Lab Draw     [facility] ONC INFUSION 360    8:30 AM   (360 min.)    ONCOLOGY INFUSION   [facility] NEW DIABETES    8:45 AM   (60 min.)   Es Gallo MD   [facility] Endocrinology 6     7     8     9       10     11     12     [facility] DRAW    8:00 AM   (15 min.)    [facility] LAB DRAW   [facility] Lab Draw     P RETURN    8:25 AM   (50 min.)   Kellie Nobles PA-C   [facility] ONC INFUSION 60    9:30 AM   (60 min.)    ONCOLOGY  INFUSION   [facility] 13     14     15     16       17     18     19     20     21     22     23       24     25     26     [facility] D[facility]    6:30 AM   (15 min.)   [facility] D[facility]   [facility] Lab Draw     [facility] RETURN    6:45 AM   (50 min.)   Kellie Nobles PA-C   [facility]     [facility] ONC INFUSION 60    8:30 AM   (60 min.)   UC ONCOLOGY INFUSION   [facility] 27     28     29 30 July 2018 Sunday Monday Tuesday Wednesday Thursday Friday Saturday   1     2     [facility] DRAW    8:00 AM   (15 min.)   [facility] DRAW   [facility] Lab Draw     [facility] RETURN    8:30 AM   (30 min.)   Jovany Monk MD   [facility]     [facility] ONC INFUSION 60    9:30 AM   (60 min.)   UC ONCOLOGY INFUSION   [facility] 3     4     5     6     7       8     9     [facility] DRAW    7:15 AM   (15 min.)   [facility] DRAW   [facility] Lab Draw     [facility] RETURN    7:35 AM   (50 min.)   Kell[facility] Nobles [facility]-C   [facility]    9:30 AM   (60 min.)   UC ONCOLOGY INFUSION   [facility] 10     11     12     13     14       15     16     17     18     19     20     21       22     23     24     25     26     27     28       29     30     31                                      Lab Results:  Recent Results (from the past 12 hour(s))   CBC with platelets differential    Collection Time: 06/05/18  8:22 AM   Result Value Ref Range    WBC 4.4 4.0 - 11.0 10e9/L    RBC Count 4.13 3.8 - 5.2 10e12/L    Hemoglobin 12.6 11.7 - 15.7 g/dL    Hematocrit 38.8 35.0 - 47.0 %    MCV 94 78 - 100 fl    MCH 30.5 26.5 - 33.0 pg    MCHC 32.5 31.5 - 36.5 g/dL    RDW 12.8 10.0 - 15.0 %    Platelet Count 320 150 - 450 10e9/L    Diff Method Automated Method     % Neutrophils 41.3 %    % Lymphocytes 47.6 %    % Monocytes 5.5 %    % Eosinophils 4.3 %    %  Basophils 1.1 %    % Immature Granulocytes 0.2 %    Nucleated RBCs 1 (H) 0 /100    Absolute Neutrophil 1.8 1.6 - 8.3 10e9/L    Absolute Lymphocytes 2.1 0.8 - 5.3 10e9/L    Absolute Monocytes 0.2 0.0 - 1.3 10e9/L    Absolute Eosinophils 0.2 0.0 - 0.7 10e9/L    Absolute Basophils 0.1 0.0 - 0.2 10e9/L    Abs Immature Granulocytes 0.0 0 - 0.4 10e9/L    Absolute Nucleated RBC 0.0    Comprehensive metabolic panel    Collection Time: 06/05/18  8:22 AM   Result Value Ref Range    Sodium 134 133 - 144 mmol/L    Potassium 4.4 3.4 - 5.3 mmol/L    Chloride 100 94 - 109 mmol/L    Carbon Dioxide 26 20 - 32 mmol/L    Anion Gap 8 3 - 14 mmol/L    Glucose 382 (H) 70 - 99 mg/dL    Urea Nitrogen 14 7 - 30 mg/dL    Creatinine 0.58 0.52 - 1.04 mg/dL    GFR Estimate >90 >60 mL/min/1.7m2    GFR Estimate If Black >90 >60 mL/min/1.7m2    Calcium 9.2 8.5 - 10.1 mg/dL    Bilirubin Total 0.4 0.2 - 1.3 mg/dL    Albumin 3.6 3.4 - 5.0 g/dL    Protein Total 7.5 6.8 - 8.8 g/dL    Alkaline Phosphatase 132 40 - 150 U/L    ALT 27 0 - 50 U/L    AST 17 0 - 45 U/L   Glucose by meter    Collection Time: 06/05/18  9:45 AM   Result Value Ref Range    Glucose 380 (H) 70 - 99 mg/dL               Follow-ups after your visit        Your next 10 appointments already scheduled     Jun 12, 2018  8:00 AM CDT   [facility] Lab Draw with  [facility] LAB DRAW   [facility] Lab Draw ([facility])    [address] [place] [address]  [address]  [facility] [address] [phone]            Jun 12, 2018  8:40 AM CDT   (Arrive by 8:25 AM)   Return Visit with Kellie Nobles PA-C   [facility] ([facility])    [address]  [address]  [facility] [address] [phone]            Jun 12, 2018  9:30 AM CDT   Infusion 60 with UC ONCOLOGY INFUSION, UC 21 [facility] ([facility])    [address] [place] [address]  [address] [place] [address]    [phone]            Jun 26, 2018  6:30 AM CDT   [facility] Lab Draw with UC [facility] LAB DRAW   [facility] Masonic Lab Draw ([facility])    [address] [place] [address]  [address]  [facility] [address]   [phone]            Jun 26, 2018  7:00 AM CDT   (Arrive by 6:45 AM)   Return Visit with Kellie Nobles PA-C   [facility] ([facility])    [address] [place] [address]  [address]  [facility] [address]   [phone]            Jun 26, 2018  8:30 AM CDT   Infusion 60 with UC ONCOLOGY INFUSION, UC 16 ATC   [facility] ([facility])    [address] [place] [address]  [address]  [facility] [address]   [phone]            Jul 02, 2018  8:00 AM CDT   [facility] Lab Draw with UC [facility] LAB DRAW   [facility]    [address] [place] [address]  [address]  [facility] [address]   [phone]            Jul 02, 2018  8:45 AM CDT   (Arrive by 8:30 AM)   Return Visit with Jovany Monk MD   [facility] ([facility])    [address] [place] [address]  [address]  [facility] [address] [phone]              Who to contact     If you have questions or need follow up information about today's clinic visit or your schedule please contact [facility] directly at [phone].  Normal or non-critical lab and imaging results will be communicated to you by MyChart, letter or phone within 4 business days after the clinic has received the results. If you do not hear from us within 7 days, please contact the clinic through MyChart or phone. If you have a critical or abnormal lab result, we will notify you by phone as soon as possible.  Submit refill requests through [employer] or call your pharmacy and they will forward the refill request to us. Please allow 3 business days for your refill to be completed.          Additional  Information About Your Visit        [employer] Information     [employer] gives you secure access to your electronic health record. If you see a primary care provider, you can also send messages to your care team and make appointments. If you have questions, please call your primary care clinic.  If you do not have a primary care provider, please call [phone] and they will assist you.        Care EveryWhere ID     This is your Care EveryWhere ID. This could be used by other organizations to access your [facility] medical records  [id number]        Your Vitals Were     Pulse Temperature Respirations Pulse Oximetry BMI (Body Mass Index)       76 98.2  F (36.8  C) (Oral) 16 98% 24.83 kg/m2        Blood Pressure from Last 3 Encounters:   06/05/18 104/73   06/05/18 104/73   05/30/18 (!) 89/63    Weight from Last 3 Encounters:   06/05/18 67.7 kg (149 lb 4 oz)   06/05/18 67.7 kg (149 lb 3.2 oz)   05/30/18 68.9 kg (152 lb)              We Performed the Following     CBC with platelets differential     Comprehensive metabolic panel          Today's Medication Changes          These changes are accurate as of 6/5/18 11:15 AM.  If you have any questions, ask your nurse or doctor.               Start taking these medicines.        Dose/Directions    blood glucose monitoring test strip   Commonly known as:  ONETOUCH VERIO IQ   Used for:  Type 2 diabetes mellitus with diabetic polyneuropathy, with long-term current use of insulin (H)   Started by:  Es Gallo MD        Use to test blood sugar 4 times daily or as directed.   Quantity:  100 each   Refills:  3       insulin isophane human 100 UNIT/ML injection   Commonly known as:  HumuLIN N PEN   Used for:  Type 2 diabetes mellitus with diabetic polyneuropathy, with long-term current use of insulin (H)   Started by:  Es Gallo MD        10 units twice daily on infusion days, 5 units twice daily on all other days.   Quantity:  9 mL   Refills:  3        insulin pen needle 32G X 4 MM   Commonly known as:  BD DOTTY U/F   Used for:  Type 2 diabetes mellitus with diabetic polyneuropathy, with long-term current use of insulin (H)   Started by:  Es Gallo MD        Use 2 pen needles daily or as directed.   Quantity:  100 each   Refills:  3       ONETOUCH DELICA LANCETS 33G Misc   Used for:  Type 2 diabetes mellitus with diabetic polyneuropathy, with long-term current use of insulin (H)   Started by:  Es Gallo MD        Dose:  4 lancet   4 lancets daily   Quantity:  100 each   Refills:  3            Where to get your medicines      These medications were sent to [address] [place] [address] [address] [address]    Hours:  TRANSPLANT PHONE NUMBER [phone] Phone:  [phone]     blood glucose monitoring test strip    insulin isophane human 100 UNIT/ML injection    insulin pen needle 32G X 4 MM    ONETOUCH DELICA LANCETS 33G Misc                Primary Care Provider Office Phone # Fax #    Solange Mcgill [phone] [address] [facility] [address]        Equal Access to Services     CHASE DE GUZMAN AH: Hadii zaheer ku hadasho Soomaali, waaxda luqadaha, qaybta kaalmada adeegyada, waxay idiin hayisaiasn chriss yip. So [facility] [phone].    ATENCIÓN: Si habla español, tiene a quiros disposición servicios gratuitos de asistencia lingüística. Llame al [phone].    We comply with applicable federal civil rights laws and [place] laws. We do not discriminate on the basis of race, color, national origin, age, disability, sex, sexual orientation, or gender identity.            Thank you!     Thank you for choosing [facility]  for your care. Our goal is always to provide you with excellent care. Hearing back from our patients is one way we can continue to improve our services. Please take a few minutes to  complete the written survey that you may receive in the mail after your visit with us. Thank you!             Your Updated Medication List - Protect others around you: Learn how to safely use, store and throw away your medicines at www.disposemymeds.org.          This list is accurate as of 6/5/18 11:15 AM.  Always use your most recent med list.                   Brand Name Dispense Instructions for use Diagnosis    acetaminophen 500 MG tablet    TYLENOL    100 tablet    Take 2 tablets (1,000 mg) by mouth every 8 hours    Acute post-operative pain       amylase-lipase-protease 47100-29501 units Cpep per EC capsule    CREON    450 capsule    Take 2-3 with meals / 1-2 with snacks, up to 15 per day.    Necrotizing pancreatitis       blood glucose monitoring test strip    ONETOUCH VERIO IQ    100 each    Use to test blood sugar 4 times daily or as directed.    Type 2 diabetes mellitus with diabetic polyneuropathy, with long-term current use of insulin (H)       capecitabine 500 MG tablet CHEMO    XELODA    126 tablet    Take 3 tablets (1,500 mg) by mouth 2 times daily for 21 days Days 1 through 21, then 7 days off. Take within 30 mins after a meal.    Pancreatic adenocarcinoma (H)       docusate sodium 100 MG capsule    COLACE    60 capsule    Take 1 capsule (100 mg) by mouth daily    Other constipation       insulin isophane human 100 UNIT/ML injection    HumuLIN N PEN    9 mL    10 units twice daily on infusion days, 5 units twice daily on all other days.    Type 2 diabetes mellitus with diabetic polyneuropathy, with long-term current use of insulin (H)       insulin pen needle 32G X 4 MM    BD DOTTY U/F    100 each    Use 2 pen needles daily or as directed.    Type 2 diabetes mellitus with diabetic polyneuropathy, with long-term current use of insulin (H)       LORazepam 0.5 MG tablet    ATIVAN    30 tablet    Take 1 tablet (0.5 mg) by mouth every 4 hours as needed (Anxiety, Nausea/Vomiting or Sleep)    Pancreatic  adenocarcinoma (H)       ondansetron 8 MG tablet    ZOFRAN    30 tablet    Take 1 tablet (8 mg) by mouth every 8 hours as needed for nausea    Pancreatic adenocarcinoma (H)       ONETOUCH DELICA LANCETS 33G Misc     100 each    4 lancets daily    Type 2 diabetes mellitus with diabetic polyneuropathy, with long-term current use of insulin (H)       pantoprazole 40 MG EC tablet    PROTONIX    30 tablet    Take 1 tablet (40 mg) by mouth every morning    Pancreatic adenocarcinoma (H)       polyethylene glycol Packet    MIRALAX/GLYCOLAX    30 packet    Take 17 g by mouth daily    Drug-induced constipation       prochlorperazine 10 MG tablet    COMPAZINE    30 tablet    Take 1 tablet (10 mg) by mouth every 6 hours as needed (Nausea/Vomiting)    Pancreatic adenocarcinoma (H)

## 2018-06-05 NOTE — NURSING NOTE
Kitty Bangura comes into clinic today at the request of  Dr Nobles  Ordering Provider for steroid induced diabetes  for insulin teach.    This service provided today was under the supervising provider of the day  Dr Es Gallo  who was available if needed.    Lora Contreras    Teaching Flowsheet   Relevant Diagnosis: Steroid induced diabetes  Insulin teach both pen and vial and  Glucometer teach      Person(s) involved in teaching:   Patient and spouse      Motivation Level:  Asks Questions:Yes  Eager to Learn: Yes  Cooperative:YES  Receptive (willing/able to accept information): YES  Any cultural factors/Latter-day beliefs that may influence understanding or compliance? NO     Patieint demonstrates understanding of the following:  Reason for the appointment, diagnosis and treatment plan:YES  Knowledge of proper use of medications and conditions for which they are ordered (with special attention to potential side effects or drug interactions): YES  Which situations necessitate calling provider and whom to contact: Yes with phone numbers given      Teaching Concerns Addressed:   Yes, she did a return demonstration  With  Excellent technique and asked appropriate questions.  I went over both Pen and vial  Syringe method.      Proper use and care of Yes, refrigeration , rolling the cloudy  (medical equip, care aids, etc.): Yes  Nutritional needs and diet plan: NA  Pain management techniques: Rotate sites,  Wound Care: Hand washing   How and/when to access community resources: Yes numbers given   Instructional Materials Used/Given: Yes directions for both pen and vial      Time spent with patient:20 minutes

## 2018-06-05 NOTE — TELEPHONE ENCOUNTER
----- Message from Vani WILSON Link sent at 6/5/2018 12:12 PM CDT -----  Regarding: RE: patient follow up  David Suggs,    I called to confrm the requested appointments.    Also, just a heads up that the patient's insurance wouldn't cover the injection pens for the insulin Rx.     I'm cc'ing the nurses so they can maybe work on a prior auth, or figure out a different solution.    Pt requests Rx be sent to [facility] pharmacy.    Thank you.  ----- Message -----     From: Es Gallo MD     Sent: 6/5/2018  10:50 AM       To: [id number]  Subject: patient follow up                                Thank you for setting up cde for this patient.     In addition, can you set up a PA appt for a Tuesday in June (next available) to coordinate with infusion. And please add her to my clinic on July 9 at noon?     Thank you!!!!  Es

## 2018-06-05 NOTE — LETTER
6/5/2018       RE: Kitty Bangura  [address] [facility] [address]     Dear Colleague,    Thank you for referring your patient, Kitty Bangura, to the [facility] ENDOCRINOLOGY at [facility]. Please see a copy of my visit note below.    Endocrinology and Diabetes Clinic Initial Visit  Date of Service: June 5, 2018    Consulting provider: Kellie Nobles PA-C  [address]  [place] [address]    Reason for consultation: diabetes    HPI:   Patient is a 57 year old woman with a history of pancreatic cancer now s/p distal pancreatectomy with elevated blood sugars. Hgb A1c was 7.8% on 5/31/2018 and was 5.5 on 4/17/2018. She initially was diagnosed in 12/2017. Underwent neoadjugant chemo with FOLFIRINOX, then surgery 4/18/2018, and is now starting Xeloda and gemcititabine. It appears that there is no evidence of recurrent disease as of 5/22/2018. She started chemotherapy with Xeloda and gemcititabine on 5/31/2018 and is receiving 12 mg dexamethasone IV with each treatment. Plan is 4 cycles of treatment (3 weeks on, 1 week off x4).     She reports increased thirst and urination over the past couple of months. No burning with urination. She has a history of prediabetes and was on metformin years ago. Has some tingling in the tips of her toes but no numbness. No history of retinopathy, heart disease, kidney problems, or non-healing skin lesions. Her entire family on her mom's side have diabetes. Denies alcohol or tobacco use.     She has never used a meter or insulin before.     She is a  on leave. Lives in [place], [place] with her . Has one son.     Review of Systems:  A 10-point ROS is otherwise negative except as noted in HPI or below.     Current Problem List:   Patient Active Problem List   Diagnosis     Acute pancreatitis     Leukocytosis     Fatty liver     Pancreatic mass     Pancreatitis     Necrotizing pancreatitis     Pancreatic  adenocarcinoma (H)     Dehydration     Pancreas cancer (H)     Pancreatic cancer (H)     Hypernatremia     Hypotension     Anemia     Thrombocytopenia (H)     Hyperglycemia       Past Medical and Past Surgical History:  Past Medical History:   Diagnosis Date     Necrotizing pancreatitis      Pancreatic cancer (H)      PONV (postoperative nausea and vomiting)        Past Surgical History:   Procedure Laterality Date     ABDOMEN SURGERY  1983    Ruptured tubal pregnancies, additional surgeries followed     BACK SURGERY  2004    L5, S1 discectomy     BREAST SURGERY  2003    Breast reduction     COLONOSCOPY  2008     ENDOSCOPIC RETROGRADE CHOLANGIOPANCREATOGRAM N/A 1/25/2018    Procedure: COMBINED ENDOSCOPIC RETROGRADE CHOLANGIOPANCREATOGRAPHY, PLACE TUBE/STENT;  Endoscopic retrograde cholangiopancreatogram with stent placement and cyst drainage bile ;  Surgeon: Vito Sanches MD;  Location: [facility]     ENDOSCOPIC ULTRASOUND LOWER GASTROINTESTIONAL TRACT (GI) N/A 1/25/2018    Procedure: ENDOSCOPIC ULTRASOUND LOWER GASTROINTESTIONAL TRACT (GI);  Endoscopic Ultrasound with guided Cyst-Gastrostomy;  Surgeon: González Metz MD;  Location: [facility] OR     ENDOSCOPIC ULTRASOUND, ESOPHAGOSCOPY, GASTROSCOPY, DUODENOSCOPY (EGD), NECROSECTOMY N/A 12/4/2017    Procedure: ENDOSCOPIC ULTRASOUND, ESOPHAGOSCOPY, GASTROSCOPY, DUODENOSCOPY (EGD), NECROSECTOMY;  Esophagogastroduodenoscopy, with  Necrosectomy and stents replacement  ;  Surgeon: González Metz MD;  Location: [facility] OR     ENDOSCOPIC ULTRASOUND, ESOPHAGOSCOPY, GASTROSCOPY, DUODENOSCOPY (EGD), NECROSECTOMY N/A 12/12/2017    Procedure: ENDOSCOPIC ULTRASOUND, ESOPHAGOSCOPY, GASTROSCOPY, DUODENOSCOPY (EGD), NECROSECTOMY;  Esophagogastroduodenoscopy with Necrosectomy, Balloon Dilation, stent removal X3 and Cystgastrostomy stent Placement X2;  Surgeon: Guru Cecilia Staples MD;  Location[facility]     ESOPHAGOSCOPY, GASTROSCOPY, DUODENOSCOPY (EGD), COMBINED  N/A 2017    Procedure: COMBINED ENDOSCOPIC ULTRASOUND, ESOPHAGOSCOPY, GASTROSCOPY, DUODENOSCOPY (EGD), FINE NEEDLE ASPIRATE/BIOPSY;  Endoscopic Ultrasound with cystgastrostomy and fine needle aspiration ;  Surgeon: González Metz MD;  [facility]     ESOPHAGOSCOPY, GASTROSCOPY, DUODENOSCOPY (EGD), COMBINED N/A 2018    Procedure: COMBINED ESOPHAGOSCOPY, GASTROSCOPY, DUODENOSCOPY (EGD);  EGD;  Surgeon: González Metz MD;  [facility]: [facility] GI     ESOPHAGOSCOPY, GASTROSCOPY, DUODENOSCOPY (EGD), COMBINED N/A 2018    Procedure: COMBINED ESOPHAGOSCOPY, GASTROSCOPY, DUODENOSCOPY (EGD), REMOVE FOREIGN BODY;;  Surgeon: González Metz MD;  [facility]     GYN SURGERY  1983    Multiple ectopic pregnancies, reconnect,  1988     INSERT PORT VASCULAR ACCESS Right 2018    Procedure: INSERT PORT VASCULAR ACCESS;  Chest Port Placement - right;  Surgeon: Chanda Lawson PA-C;  [facility]     LAPAROSCOPY DIAGNOSTIC (GENERAL) N/A 2018    Procedure: LAPAROSCOPY DIAGNOSTIC (GENERAL);  Diagnostic Laparoscopy; Open Distal Pancreatectomy And Splenectomy, splenic flexure mobilization, cholecystectomy, intraoperative ultrasound;  Surgeon: Ja Byrd MD;  [facility]     PANCREATECTOMY, SPLENECTOMY N/A 2018    Procedure: PANCREATECTOMY, SPLENECTOMY;;  Surgeon: Ja Byrd MD;  [facility]       Medications:   Current Outpatient Prescriptions   Medication Sig Dispense Refill     acetaminophen (TYLENOL) 500 MG tablet Take 2 tablets (1,000 mg) by mouth every 8 hours (Patient not taking: Reported on 2018) 100 tablet 1     amylase-lipase-protease (CREON) 71068-47112 UNITS CPEP per EC capsule Take 2-3 with meals / 1-2 with snacks, up to 15 per day. 450 capsule 6     capecitabine (XELODA) 500 MG tablet CHEMO Take 3 tablets (1,500 mg) by mouth 2 times daily for 21 days Days 1 through 21, then 7 days off. Take within 30 mins after a meal. 126 tablet 0     docusate  sodium (COLACE) 100 MG capsule Take 1 capsule (100 mg) by mouth daily 60 capsule 1     LORazepam (ATIVAN) 0.5 MG tablet Take 1 tablet (0.5 mg) by mouth every 4 hours as needed (Anxiety, Nausea/Vomiting or Sleep) (Patient not taking: Reported on 6/5/2018) 30 tablet 2     ondansetron (ZOFRAN) 8 MG tablet Take 1 tablet (8 mg) by mouth every 8 hours as needed for nausea 30 tablet 0     pantoprazole (PROTONIX) 40 MG EC tablet Take 1 tablet (40 mg) by mouth every morning (Patient not taking: Reported on 5/30/2018) 30 tablet 0     polyethylene glycol (MIRALAX/GLYCOLAX) Packet Take 17 g by mouth daily (Patient not taking: Reported on 6/5/2018) 30 packet 0     prochlorperazine (COMPAZINE) 10 MG tablet Take 1 tablet (10 mg) by mouth every 6 hours as needed (Nausea/Vomiting) (Patient not taking: Reported on 6/5/2018) 30 tablet 2       Allergies:   No Known Allergies    Social History:  Social History   Substance Use Topics     Smoking status: Former Smoker     Packs/day: 0.50     Years: 4.00     Types: Cigarettes     Start date: 1/1/1974     Quit date: 1981     Smokeless tobacco: Never Used     Alcohol use No      Comment: Now quit since Oct 31.  Moderate drinker in past       Family History:  Family History   Problem Relation Age of Onset     HEART DISEASE Father      Hyperlipidemia Father      HEART DISEASE Brother      DIABETES Mother      Hypertension Mother      Obesity Mother      DIABETES Maternal Grandfather      Hypertension Maternal Grandfather      Obesity Maternal Grandfather      DIABETES Sister      Hypertension Sister      Depression Sister      Anxiety Disorder Sister      Obesity Sister      Hypertension Brother      Hyperlipidemia Brother      Breast Cancer Cousin      Breast Cancer Cousin      Breast Cancer Cousin      Colon Cancer Other      Other Cancer Other      Mesothelioma     Depression Sister      Anxiety Disorder Brother      Anxiety Disorder Son      MENTAL ILLNESS Sister      Schizophrenia      Obesity Maternal Grandmother      Obesity Sister        Physical Examination:  Blood pressure 104/73, pulse 76, weight 149 lb 4 oz (67.7 kg), not currently breastfeeding.  Body mass index is 24.84 kg/(m^2).  Wt Readings from Last 4 Encounters:   06/12/18 147 lb 14.4 oz (67.1 kg)   06/05/18 149 lb 4 oz (67.7 kg)   06/05/18 149 lb 3.2 oz (67.7 kg)   05/30/18 152 lb (68.9 kg)     GEN: generally, well appearing.  HEENT: EOMI.  NECK: Thyroid non-palpable, non-tender. No cervical or clavicular LAD.   CV: RRR with no murmur.   RESP: CTA bilaterally.   ABD: Soft, non-tender, and non-distended, with normal BS.   EXT: No peripheral edema.   SKIN: Normal skin temperature and turgor with no lesions.   NEURO: Non-focal.    Labs and Studies:   Component      Latest Ref Rng & Units 5/30/2018 6/5/2018   WBC      4.0 - 11.0 10e9/L  4.4   RBC Count      3.8 - 5.2 10e12/L  4.13   Hemoglobin      11.7 - 15.7 g/dL  12.6   Hematocrit      35.0 - 47.0 %  38.8   MCV      78 - 100 fl  94   MCH      26.5 - 33.0 pg  30.5   MCHC      31.5 - 36.5 g/dL  32.5   RDW      10.0 - 15.0 %  12.8   Platelet Count      150 - 450 10e9/L  320   Diff Method        Automated Method   % Neutrophils      %  41.3   % Lymphocytes      %  47.6   % Monocytes      %  5.5   % Eosinophils      %  4.3   % Basophils      %  1.1   % Immature Granulocytes      %  0.2   Nucleated RBCs      0 /100  1 (H)   Absolute Neutrophil      1.6 - 8.3 10e9/L  1.8   Absolute Lymphocytes      0.8 - 5.3 10e9/L  2.1   Absolute Monocytes      0.0 - 1.3 10e9/L  0.2   Absolute Eosinophils      0.0 - 0.7 10e9/L  0.2   Absolute Basophils      0.0 - 0.2 10e9/L  0.1   Abs Immature Granulocytes      0 - 0.4 10e9/L  0.0   Absolute Nucleated RBC        0.0   Hemoglobin A1C      0 - 5.6 % 7.8 (H)        Assessment:    Type 2 Diabetes and steroid induced hyperglycemia - she now has overt diabetes 2/2 to steroids, partial pancreatectomy, and family history. She will be receiving large doses of  dexamethasone 12 mg IV with each chemotherapy infusion. Using 0.3 u/kg for high dose steroids in calculating NPH dose, we will start with NPH 10 units BID on infusion days but reduce the dose to 5 units BID on non-infusion days. She will see a PA soon and also Dr. Es Gallo again on 7/9/2018. We discussed symptoms of hypoglycemia and how to treat with 15 g carbs and recheck in 15 minutes. She will let us know if she has any sugars <70 or if she is consistently having blood sugars <100. She will MyChart message Dr. Gallo in 1 week with her blood sugars.     Plan from patient instruction given to patient:   1. Take NPH 10 units twice daily on infusion days, 5 units on all other days.  *note: she may need NPH 10 BID for 1-2 additional days after infusion but will wait for more glucose data  2. On days of your infusion, check your blood sugar 4 times - fasting, before meals, and at bedtime.  3. On off days, check your sugar every morning.  4. If blood sugar is <70, take 15 g carbs and retest in 15 minutes.  5. If you are regularly getting blood sugars <100, please let Dr. Es Gallo know via [employer] or call the clinic.  6. Return to clinic for diabetes educator appointment when able.  7. Return to clinic for for follow-up with a physician assistant. (next available appointment)    8. We will add you to Dr. Gallo's schedule for follow-up on July 9th at noon.     Patient was seen and examined with attending physician, Dr. Es Gallo.    Jhony Godfrey MD   Endocrinology Fellow  Pager: [phone]    Endocrine Staff Note    The patient was seen and examined by me with Dr. Godfrey. His note details our mutual findings and plan.    Again, thank you for allowing me to participate in the care of your patient.      Sincerely,    Es Gallo MD

## 2018-06-05 NOTE — TELEPHONE ENCOUNTER
----- Message from Es Gallo MD sent at 6/5/2018  3:49 PM CDT -----  Regarding: RE: patient follow up  Thank you.   Dr Godfrey also sent the prescription for vials and syringes, so I believe she is covered until/if the PA comes through.   Es     ----- Message -----     From: Barby Conroy RN     Sent: 6/5/2018  12:34 PM       To: Es Gallo MD  Subject: FW: patient follow up                            PA initiated.   ----- Message -----     From: Vani Howard     Sent: 6/5/2018  12:12 PM       To: Es Gallo MD, #  Subject: RE: patient follow up                            David Suggs,    I called to confrm the requested appointments.    Also, just a heads up that the patient's insurance wouldn't cover the injection pens for the insulin Rx.     I'm cc'ing the nurses so they can maybe work on a prior auth, or figure out a different solution.    Pt requests Rx be sent to [facility] pharmacy.    Thank you.  ----- Message -----     From: Es Gallo MD     Sent: 6/5/2018  10:50 AM       To: [id number]  Subject: patient follow up                                Thank you for setting up cde for this patient.     In addition, can you set up a PA appt for a Tuesday in June (next available) to coordinate with infusion. And please add her to my clinic on July 9 at noon?     Thank you!!!!  Es

## 2018-06-05 NOTE — TELEPHONE ENCOUNTER
Central Prior Authorization Team   Phone: [phone]      PA Initiation    Medication: insulin NPH (HUMULIN N/NOVOLIN N) 100 UNIT/ML injection-PA initiated  Insurance Company: [employer] - Phone [phone] Fax [phone]  Pharmacy Filling the Rx: [facility] [place], [place] - [address]  Filling Pharmacy Phone: [phone]  Filling Pharmacy Fax:    Start Date: 6/5/2018

## 2018-06-07 NOTE — TELEPHONE ENCOUNTER
Marika at [employer] states this was denied. She will fax denial letter. Will update when received.

## 2018-06-12 ENCOUNTER — ONCOLOGY VISIT (OUTPATIENT)
Dept: ONCOLOGY | Facility: CLINIC | Age: 58
End: 2018-06-12
Attending: PHYSICIAN ASSISTANT
Payer: COMMERCIAL

## 2018-06-12 ENCOUNTER — OFFICE VISIT (OUTPATIENT)
Dept: EDUCATION SERVICES | Facility: CLINIC | Age: 58
End: 2018-06-12
Payer: COMMERCIAL

## 2018-06-12 ENCOUNTER — APPOINTMENT (OUTPATIENT)
Dept: LAB | Facility: CLINIC | Age: 58
End: 2018-06-12
Attending: PHYSICIAN ASSISTANT
Payer: COMMERCIAL

## 2018-06-12 VITALS
DIASTOLIC BLOOD PRESSURE: 65 MMHG | OXYGEN SATURATION: 99 % | WEIGHT: 147.9 LBS | HEART RATE: 76 BPM | TEMPERATURE: 97.4 F | HEIGHT: 65 IN | BODY MASS INDEX: 24.64 KG/M2 | SYSTOLIC BLOOD PRESSURE: 106 MMHG | RESPIRATION RATE: 16 BRPM

## 2018-06-12 DIAGNOSIS — T38.0X5A STEROID-INDUCED DIABETES MELLITUS (H): Primary | ICD-10-CM

## 2018-06-12 DIAGNOSIS — C25.9 PANCREATIC ADENOCARCINOMA (H): ICD-10-CM

## 2018-06-12 DIAGNOSIS — E09.9 STEROID-INDUCED DIABETES MELLITUS (H): Primary | ICD-10-CM

## 2018-06-12 PROCEDURE — 36591 DRAW BLOOD OFF VENOUS DEVICE: CPT

## 2018-06-12 PROCEDURE — 99215 OFFICE O/P EST HI 40 MIN: CPT | Mod: ZP | Performed by: PHYSICIAN ASSISTANT

## 2018-06-12 PROCEDURE — 25000128 H RX IP 250 OP 636: Mod: ZF | Performed by: PHYSICIAN ASSISTANT

## 2018-06-12 PROCEDURE — G0463 HOSPITAL OUTPT CLINIC VISIT: HCPCS | Mod: ZF

## 2018-06-12 RX ORDER — PANTOPRAZOLE SODIUM 40 MG/1
40 TABLET, DELAYED RELEASE ORAL EVERY MORNING
Qty: 90 TABLET | Refills: 1 | Status: SHIPPED | OUTPATIENT
Start: 2018-06-12 | End: 2018-08-28

## 2018-06-12 RX ORDER — HEPARIN SODIUM (PORCINE) LOCK FLUSH IV SOLN 100 UNIT/ML 100 UNIT/ML
5 SOLUTION INTRAVENOUS
Status: COMPLETED | OUTPATIENT
Start: 2018-06-12 | End: 2018-06-12

## 2018-06-12 RX ADMIN — SODIUM CHLORIDE, PRESERVATIVE FREE 5 ML: 5 INJECTION INTRAVENOUS at 07:59

## 2018-06-12 ASSESSMENT — PAIN SCALES - GENERAL: PAINLEVEL: NO PAIN (0)

## 2018-06-12 NOTE — PROGRESS NOTES
Oncology/Hematology Visit Note  Jun 12, 2018    Reason for Visit: Follow up of resectable pancreatic cancer    History of Present Illness: Staging CT chest/abd/pelvis on 12/9/17 showed several small pulmonary nodules (largest 3mm), unchanged mass in the pancreatic tail, mildly prominent mesenteric nodes thought likely reactive, and small right hepatic lobe hypodensities. Abdominal MRI on 12/10/17 showed hepatic hemangiomas, no evidence of metastatic disease to the liver.   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     Kitty met with Dr Monk and discussed neoadjuvant chemotherapy with FOLFIRINOX, then surgery and then adjuvant chemotherapy. She was seen on 1/15/18 for cycle 1 and then hospitalized for pancreatitis on 1/20/18 where she was managed with IV fluds and pain control. GI took her to the OR to attempt pancreatic duct stent placement but unfortunately it was completely obstructed. They were able to place a pancreatic stent and perform an EUS cyst-gastrostomy with plans to repeat Xray a month following to ensure stent passage. She was d/c 1/27. Cycle 2 was given 2/5/18 (a week delayed due to hospitalization).     She went to ED on 2/11 with worsening abdominal pain and was worried about pancreatitis. W/u with CT and labs including lipase were negative. She had elevated WBC of 33K but had Neulasta on 2/8.     Cycle 3 was given on 2/20 with dose-reduction (10%) of oxaliplatin due to significant cold sensitivity and motor neuropathy. Complicated by bronchitis.    Cycle 4 was given 3/8/18. She went on to have distal pancreatotomy and splenectomy and cholecystectomy on 4/17/18. Final pathology showed complete pathologic response, 26 benign lymph nodes, and necrotizing pancreatitis.     She started adjuvant chemotherapy with Xeloda and gemcitabine 5/30/18. She had baseline CT on 5/22/18 showing no evidence of  recurrent disease.     Interval History:  Mrs. Bangura returns to clinic today with her  prior to cycle 1 day 15. She has tolerated gem/xeloda fairly well. She feels tired for a few days after infusions. Had some mucositis start and has been using salt and soda rinses regularly. This is helping some. She has had mild nausea and only needed 1 antiemetic weekly. Her constipation has improved with using colace. She is having 1-2 soft BMs daily. Rectal irritation is feeling better. No diarrhea or HFS. She has had a few episodes of post-prandial pain which resolves in about 20 minutes. Has some indigestion as well. Stopped pantoprazole about a month ago as the Rx was done. Her BS are still up and down, lowest 122. Had as high as 500 after chemo. No recent fevers/chills or infectious concerns. She is still walking several miles daily.     Review of Systems:  Patient denies fevers, chills, night sweats, unexplained weight changes, headaches, dizziness, vision or hearing changes, new lumps or bumps, chest pain, shortness of breath, cough, abdominal pain, nausea, vomiting, changes to bowel or bladder, swelling of extremities, bleeding issues, or rash.    Current Outpatient Prescriptions   Medication Sig Dispense Refill     amylase-lipase-protease (CREON) 60634-99176 UNITS CPEP per EC capsule Take 2-3 with meals / 1-2 with snacks, up to 15 per day. 450 capsule 6     blood glucose monitoring (ONETOUCH VERIO IQ) test strip Use to test blood sugar 4 times daily or as directed. 100 each 3     capecitabine (XELODA) 500 MG tablet CHEMO Take 3 tablets (1,500 mg) by mouth 2 times daily for 21 days Days 1 through 21, then 7 days off. Take within 30 mins after a meal. 126 tablet 0     docusate sodium (COLACE) 100 MG capsule Take 1 capsule (100 mg) by mouth daily 60 capsule 1     insulin NPH (HUMULIN N/NOVOLIN N) 100 UNIT/ML injection Inject 10 units twice daily on infusion days, 5 units twice daily on all other days. 3 vial 3  "    insulin syringes, disposable, U-100 0.3 ML MISC Inject 10 units twice daily on infusion days, 5 units twice daily all other days 1 each 3     ondansetron (ZOFRAN) 8 MG tablet Take 1 tablet (8 mg) by mouth every 8 hours as needed for nausea 30 tablet 0     ONETOUCH DELICA LANCETS 33G MISC 4 lancets daily 100 each 3     pantoprazole (PROTONIX) 40 MG EC tablet Take 1 tablet (40 mg) by mouth every morning 90 tablet 1     acetaminophen (TYLENOL) 500 MG tablet Take 2 tablets (1,000 mg) by mouth every 8 hours (Patient not taking: Reported on 6/5/2018) 100 tablet 1     LORazepam (ATIVAN) 0.5 MG tablet Take 1 tablet (0.5 mg) by mouth every 4 hours as needed (Anxiety, Nausea/Vomiting or Sleep) (Patient not taking: Reported on 6/5/2018) 30 tablet 2     polyethylene glycol (MIRALAX/GLYCOLAX) Packet Take 17 g by mouth daily (Patient not taking: Reported on 6/5/2018) 30 packet 0     prochlorperazine (COMPAZINE) 10 MG tablet Take 1 tablet (10 mg) by mouth every 6 hours as needed (Nausea/Vomiting) (Patient not taking: Reported on 6/5/2018) 30 tablet 2       Physical Examination:  /65 (BP Location: Right arm, Patient Position: Sitting, Cuff Size: Adult Regular)  Pulse 76  Temp 97.4  F (36.3  C) (Oral)  Resp 16  Ht 1.651 m (5' 5\")  Wt 67.1 kg (147 lb 14.4 oz)  SpO2 99%  BMI 24.61 kg/m2  Wt Readings from Last 10 Encounters:   06/12/18 67.1 kg (147 lb 14.4 oz)   06/05/18 67.7 kg (149 lb 4 oz)   06/05/18 67.7 kg (149 lb 3.2 oz)   05/30/18 68.9 kg (152 lb)   05/14/18 68.7 kg (151 lb 8 oz)   05/14/18 68.7 kg (151 lb 8 oz)   05/08/18 68 kg (150 lb)   05/04/18 69.4 kg (153 lb)   04/30/18 69.9 kg (154 lb)   04/21/18 74.8 kg (164 lb 12.8 oz)     Constitutional: Well-appearing female in no acute distress.  Eyes: EOMI, PERRL. No scleral icterus.  ENT: Oral mucosa is moist without thrush but she has some irritative mucositis buccal mucosa b/l and irritation L lower anterior gingiva   Lymphatic: Neck is supple without cervical or " supraclavicular lymphadenopathy.   Cardiovascular: Regular rate and rhythm. No murmurs, gallops, or rubs. No peripheral edema.  Respiratory: Clear to auscultation bilaterally. No wheezes or crackles.   Gastrointestinal: Bowel sounds present. Abdomen soft, nontender, nondistended. No palpable hepatosplenomegaly or masses.   Neurologic: Cranial nerves II through XII are grossly intact.  Skin: No rashes, petechiae, or bruising noted on exposed skin.    Laboratory Data:   6/12/2018 08:03   WBC 3.9 (L)   Hemoglobin 12.4   Hematocrit 37.9   Platelet Count 142 (L)   RBC Count 4.02   MCV 94   MCH 30.8   MCHC 32.7   RDW 13.2   Diff Method Automated Method   % Neutrophils 17.9   % Lymphocytes 74.9   % Monocytes 5.7   % Eosinophils 1.0   % Basophils 0.5   % Immature Granulocytes 0.0   Nucleated RBCs 2 (H)   Absolute Neutrophil 0.7 (L)   Absolute Lymphocytes 2.9   Absolute Monocytes 0.2   Absolute Eosinophils 0.0   Absolute Basophils 0.0   Abs Immature Granulocytes 0.0   Absolute Nucleated RBC 0.1       Assessment and Plan:  1. Pancreatic cancer  S/p 4 cycles of neoadjuvant  FOLFIRINOX with good response so she was able to undergo distal pancreatectomy and splenectomy. Her pathology showed complete pathologic response. She started adjuvant gem/xeloda 2 weeks ago and is tolerating well with mild symptoms. Unfortunately, her ANC today is only 700 so will cancel treatment this week and follow-up in 2 weeks prior to cycle 2. Plan for 4 cycles followed by surveillance. She will call with any interval concerns.     2. DM: Secondary to prediabetes, family history, and now distal pancreatectomy. She saw endocrinology last week and was started on NPH with dosing for steroids. Since her nausea has been minimal, I should be able to decrease dexamethasone to 8 mg starting with cycle 2. She has follow-up with diabetes educator today.     3. Rectal irritation: Continue colace 1-2 tabs daily.    4. Epigastric pain with indigestion: Resume  pantoprazole 40 mg daily. Discussed using prn TUMs.     Kellie Nobles PA-C  [facility]  [address] [place] [address] [phone]

## 2018-06-12 NOTE — PROGRESS NOTES
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    She is accompanied by   Referring Provider: Es Gallo MD     DIABETES RELATED CONCERNS/COMMENTS:   Trying to get blood sugar leveled out, longer term would like to use pills or diet to control blood sugar  Patient's emotional response to diabetes: expresses readiness to learn and concern for health and well-being    Past Diabetes Education: Yes  Support system: spouse/significant other, son and girlfriend  Living Situation: lives with spouse/significant other and children  Occupation: , on medical leave    ASSESSMENT:  Patient Problem List and Medication List reviewed for relevant medical history, current medical status, and diabetes risk factors.    Current Diabetes Management per Patient:  Diabetes medications: YES, Injectable Medications:  At risk for hypoglycemia? Yes  and Symptoms: Shaky and Hungry  BG meter: One Touch Verio Flex meter  Patient glucose self monitoring as follows: four times daily.   BG results: see blood glucose log attached to this encounter   BG values are: Not in goal  Patient's most recent A1C 7.8% on 5/31/2018         Nutrition:  Patient eats 3 meals per day  Breakfast: (8a) small smoothie with yogurt, protein powder and 1/2 banana, 1/2 cup oatmeal, honey and flax, coconut oil, coffee or tea  Snack:  Lunch:(12p) protein, salad, sandwich, soup or leftovers, water  Snack: (330-4p) toast with almond butter and sliced banana, milk or piece of fruit and yogurt  Dinner: (7p)protein, salad, 1/2 pork chop, leftover mashed potato, corn, milk or water  Snack: frozen yogurt, crackers      Physical Activity:     walks up to 3 miles per day, stretching    Diabetes Complications:  She is at risk for low blood sugars but has not had any.  She did feel shaky and hot and hungry  when blood sugar got down to 122 mg/dl.    Diabetes knowledge and skills assessment:     Patient is knowledgeable in diabetes management concepts related to: Healthy Eating, Being Active, Monitoring, Taking Medication, Problem Solving, Reducing Risks and Healthy Coping  Barriers to Learning Assessment: No Barriers identified    Based on learning assessment above, most appropriate setting for further diabetes education would be: Individual setting.      Education provided today on:  We covered the pathophysiology of diabetes today focusing on how steroids and surgery can impact blood sugars.  We also reviewed the basics of carb counting because they asked for assistance with that.      All aspects of using insulin including: insulin action time, storage, side effects, injection sites, vial and syringe use were reviewed as well.    Hypoglycemia protocol was discussed at length.  They were given some glucose tablets so she can take them with her on walks.    Will forward blood sugars to Dr. Gallo as she asked for them in one week at visit last week.    Kitty and her  seem to be coping well in the midst of many medical issues.  They asked many questions and are working on        PLAN:  Patient needs further education on the following diabetes management concepts: Taking Medication, Problem Solving, Reducing Risks and Healthy Coping    See Patient Instructions for co-developed, patient-stated behavior change goals.  AVS printed and provided to patient today.    FOLLOW-UP:  Follow-up appointment in one month.  Left message for Kitty to increase NPH to 7 units twice daily on non steroid days.  Send numbers in a week.  Chart routed to referring provider.    Ongoing plan for education and support: Written resources (magazines, books, etc.)  Time Spent: 60 minutes  Encounter Type: Individual    Any diabetes medication dose changes were made via the CDE Protocol and Collaborative Practice Agreement with [facility].

## 2018-06-12 NOTE — MR AVS SNAPSHOT
After Visit Summary   6/12/2018    Kitty Bangura    MRN: [id number]           Patient Information     Date Of Birth          1960        Visit Information        Provider Department      6/12/2018 11:30 AM Yeni Mota RN [facility] Diabetes        Care Instructions    1.  I will send your blood sugars to Dr. Gallo and we will contact you about insulin changes.    2.  Keep counting your carbs and making healthy food choices.    3.  Keep walking.  Carry glucose tablets with you.    4.  Write down blood sugars and export to e-mail for follow up on blood sugars.    5.  Follow up in one month for more education.    Yeni Mota RN,CDE  [address] [address]  Phone: [phone] or [phone]  [email or web address]              Follow-ups after your visit        Your next 10 appointments already scheduled     Jun 26, 2018  6:30 AM CDT   Masonic Lab Draw with UC MASONIC LAB DRAW   [facility]    [address] [place] [address]  [address] [place] [address]   [phone]            Jun 26, 2018  7:00 AM CDT   (Arrive by 6:45 AM)   Return Visit with Kellie Nobles PA-C   [facility] ([facility])    [address] [place] [address]  [address] [place] [address]   [phone]            Jun 26, 2018  8:30 AM CDT   Infusion 60 with UC ONCOLOGY INFUSION, UC 16 ATC   [facility] ([facility])    [address] [place] [address]  [address]  [facility] [address]   [phone]            Jul 02, 2018  9:00 AM CDT   Masonic Lab Draw with UC MASONIC LAB DRAW   [facility] Masonic Lab Draw ([facility])    [address] [place] [address]  [address]  [facility] [address]   [phone]            Jul 02, 2018  9:30 AM CDT   Infusion 60 with UC ONCOLOGY INFUSION, UC 21 ATC   [facility] ([facility]  [facility])    [address]  [address]  [facility] [address]   [phone]            Jul 09, 2018  9:00 AM CDT   Masonic Lab Draw with  MASONIC LAB DRAW   [facility] Lab Draw ([facility])    [address]  [address]  [facility] [address]   [phone]            Jul 09, 2018  9:30 AM CDT   Infusion 60 with UC ONCOLOGY INFUSION, UC 22 ATC   [facility] ([facility])    [address]  [address]  [facility] [address] [phone]              Who to contact     Please call your clinic at [phone] to:    Ask questions about your health    Make or cancel appointments    Discuss your medicines    Learn about your test results    Speak to your doctor            Additional Information About Your Visit        [employer]se[employer] Information     [facility] gives you secure access to your electronic health record. If you see a primary care provider, you can also send messages to your care team and make appointments. If you have questions, please call your primary care clinic.  If you do not have a primary care provider, please call [phone] and they will assist you.      [facility] is an electronic gateway that provides easy, online access to your medical records. With [facility], you can request a clinic appointment, read your test results, renew a prescription or communicate with your care team.     To access your existing account, please contact your [facility] or call [phone] for assistance.        Care EveryWhere ID     This is your Care EveryWhere ID. This could be used by other organizations to access your [place] medical records  [id number]         Blood Pressure from Last 3 Encounters:   06/12/18 106/65   06/05/18 104/73   06/05/18 104/73    Weight from Last 3 Encounters:   06/12/18 67.1 kg (147 lb 14.4 oz)   06/05/18 67.7 kg (149 lb 4 oz)   06/05/18 67.7 kg (149 lb  3.2 oz)              Today, you had the following     No orders found for display         Where to get your medicines      These medications were sent to [facility] - [place], [place] - [address]  [address], [facility] [address]    Hours:  TRANSPLANT PHONE NUMBER [phone] Phone:  [phone]     pantoprazole 40 MG EC tablet          Primary Care Provider Office Phone # Fax #    Josenargis Julito Mcgill [phone] [address] [address]        Equal Access to Services     MAN [facility] Hospital[facility]ANICE : Hadii zaheer guerrero hadasho Sopaulo, waaxda luqadaha, qaybta kaalmada adechengyafredy, re gerardo . So [facility] [phone].    ATENCIÓN: Si habla español, tiene a quiros disposición servicios gratuitos de asistencia lingüística. [facility] [phone].    We comply with applicable federal civil rights laws and [place] laws. We do not discriminate on the basis of race, color, national origin, age, disability, sex, sexual orientation, or gender identity.            Thank you!     Thank you for choosing [facility] DIABETES  for your care. Our goal is always to provide you with excellent care. Hearing back from our patients is one way we can continue to improve our services. Please take a few minutes to complete the written survey that you may receive in the mail after your visit with us. Thank you!             Your Updated Medication List - Protect others around you: Learn how to safely use, store and throw away your medicines at www.disposemymeds.org.          This list is accurate as of 6/12/18 12:29 PM.  Always use your most recent med list.                   Brand Name Dispense Instructions for use Diagnosis    acetaminophen 500 MG tablet    TYLENOL    100 tablet    Take 2 tablets (1,000 mg) by mouth every 8 hours    Acute post-operative pain       amylase-lipase-protease [address] units Cpep per EC capsule    CREON     450 capsule    Take 2-3 with meals / 1-2 with snacks, up to 15 per day.    Necrotizing pancreatitis       blood glucose monitoring test strip    ONETOUCH VERIO IQ    100 each    Use to test blood sugar 4 times daily or as directed.    Type 2 diabetes mellitus with diabetic polyneuropathy, with long-term current use of insulin (H)       capecitabine 500 MG tablet CHEMO    XELODA    126 tablet    Take 3 tablets (1,500 mg) by mouth 2 times daily for 21 days Days 1 through 21, then 7 days off. Take within 30 mins after a meal.    Pancreatic adenocarcinoma (H)       docusate sodium 100 MG capsule    COLACE    60 capsule    Take 1 capsule (100 mg) by mouth daily    Other constipation       insulin  UNIT/ML injection    HumuLIN N/NovoLIN N    3 vial    Inject 10 units twice daily on infusion days, 5 units twice daily on all other days.    Type 2 diabetes mellitus with diabetic polyneuropathy, with long-term current use of insulin (H)       insulin syringes (disposable) U-100 0.3 ML Misc     1 each    Inject 10 units twice daily on infusion days, 5 units twice daily all other days    Type 2 diabetes mellitus with diabetic polyneuropathy, with long-term current use of insulin (H)       LORazepam 0.5 MG tablet    ATIVAN    30 tablet    Take 1 tablet (0.5 mg) by mouth every 4 hours as needed (Anxiety, Nausea/Vomiting or Sleep)    Pancreatic adenocarcinoma (H)       ondansetron 8 MG tablet    ZOFRAN    30 tablet    Take 1 tablet (8 mg) by mouth every 8 hours as needed for nausea    Pancreatic adenocarcinoma (H)       ONETOUCH DELICA LANCETS 33G Misc     100 each    4 lancets daily    Type 2 diabetes mellitus with diabetic polyneuropathy, with long-term current use of insulin (H)       pantoprazole 40 MG EC tablet    PROTONIX    90 tablet    Take 1 tablet (40 mg) by mouth every morning    Pancreatic adenocarcinoma (H)       polyethylene glycol Packet    MIRALAX/GLYCOLAX    30 packet    Take 17 g by mouth daily     Drug-induced constipation       prochlorperazine 10 MG tablet    COMPAZINE    30 tablet    Take 1 tablet (10 mg) by mouth every 6 hours as needed (Nausea/Vomiting)    Pancreatic adenocarcinoma (H)

## 2018-06-12 NOTE — MR AVS SNAPSHOT
After Visit Summary   6/12/2018    Kitty Bangura    MRN: [id number]           Patient Information     Date Of Birth          1960        Visit Information        Provider Department      6/12/2018 8:40 AM Kellie Nobles PA-C [facility]        Today's Diagnoses     Pancreatic adenocarcinoma (H)           Follow-ups after your visit        Your next 10 appointments already scheduled     Jun 12, 2018 11:30 AM CDT   (Arrive by 11:15 AM)   Office Visit with Yeni Mota RN   [facility])    [address] [place] [address]  3rd Floor  [facility] [address]   [phone]           Bring a current list of meds and any records pertaining to this visit. For Physicals, please bring immunization records and any forms needing to be filled out. Please arrive 10 minutes early to complete paperwork.            Jun 26, 2018  6:30 AM CDT   Masonic Lab Draw with  MASONIC LAB DRAW   [facility]    [address] [place] [address]  [address]  [facility] [address]   [phone]            Jun 26, 2018  7:00 AM CDT   (Arrive by 6:45 AM)   Return Visit with Kellie Nobles PA-C   [facility] ([facility])    [address] [place] [address]  [address]  [facility] [address]   [phone]            Jun 26, 2018  8:30 AM CDT   Infusion 60 with UC ONCOLOGY INFUSION, UC 16 ATC   [facility] ([facility])    [address] [place] [address]  [address]  [facility] [address]   [phone]            Jul 02, 2018  8:00 AM CDT   Masonic Lab Draw with UC MASONIC LAB DRAW   [facility])    [address] [place] [address]  [address]  [facility] [address]   [phone]            Jul 02, 2018  8:45 AM CDT   (Arrive by 8:30 AM)   Return Visit with Jovany Monk MD   [facility]  ([facility])    [address]  [address]  [facility] [address]   [phone]            Jul 02, 2018  9:30 AM CDT   Infusion 60 with UC ONCOLOGY INFUSION, UC 21 ATC   [facility] ([facility])    [address]  [address]  [facility] [address] [phone]            Jul 09, 2018  7:15 AM CDT   Masonic Lab Draw with UC MASONIC LAB DRAW   [facility] Lab Draw ([facility])    [address]  [address]  [facility] [address] [phone]              Who to contact     If you have questions or need follow up information about today's clinic visit or your schedule please contact [facility] directly at [phone].  Normal or non-critical lab and imaging results will be communicated to you by MyChart, letter or phone within 4 business days after the clinic has received the results. If you do not hear from us within 7 days, please contact the clinic through [employer] or phone. If you have a critical or abnormal lab result, we will notify you by phone as soon as possible.  Submit refill requests through [employer] or call your pharmacy and they will forward the refill request to us. Please allow 3 business days for your refill to be completed.          Additional Information About Your Visit        MyChart Information     [employer] gives you secure access to your electronic health record. If you see a primary care provider, you can also send messages to your care team and make appointments. If you have questions, please call your primary care clinic.  If you do not have a primary care provider, please call [phone] and they will assist you.        Care EveryWhere ID     This is your Care EveryWhere ID. This could be used by other organizations to access your [place] medical records  [id number]        Your Vitals Were     Pulse Temperature Respirations Height Pulse  "Oximetry BMI (Body Mass Index)    76 97.4  F (36.3  C) (Oral) 16 1.651 m (5' 5\") 99% 24.61 kg/m2       Blood Pressure from Last 3 Encounters:   06/12/18 106/65   06/05/18 104/73   06/05/18 104/73    Weight from Last 3 Encounters:   06/12/18 67.1 kg (147 lb 14.4 oz)   06/05/18 67.7 kg (149 lb 4 oz)   06/05/18 67.7 kg (149 lb 3.2 oz)              Today, you had the following     No orders found for display         Where to get your medicines      These medications were sent to [place], [place] - [address]  [address] [facility] [address] [place] [address]    Hours:  TRANSPLANT PHONE NUMBER [phone] Phone:  [phone]     pantoprazole 40 MG EC tablet          Primary Care Provider Office Phone # Fax #    Solange Mcgill [phone] [address] [place] [facility] [address]        Equal Access to Services     MAN [facility] : Hadii zaheer donovan Sopaulo, waaxda lubulmaroadaha, qaybta kaalmada marty, re gerardo . So [facility] [phone].    ATENCIÓN: Si habla español, tiene a quiros disposición servicios gratuitos de asistencia lingüística. Llame al [phone].    We comply with applicable federal civil rights laws and [place] laws. We do not discriminate on the basis of race, color, national origin, age, disability, sex, sexual orientation, or gender identity.            Thank you!     Thank you for choosing [facility]  for your care. Our goal is always to provide you with excellent care. Hearing back from our patients is one way we can continue to improve our services. Please take a few minutes to complete the written survey that you may receive in the mail after your visit with us. Thank you!             Your Updated Medication List - Protect others around you: Learn how to safely use, store and throw away your medicines at www.disposemymeds.org.          This list is accurate as of " 6/12/18  9:50 AM.  Always use your most recent med list.                   Brand Name Dispense Instructions for use Diagnosis    acetaminophen 500 MG tablet    TYLENOL    100 tablet    Take 2 tablets (1,000 mg) by mouth every 8 hours    Acute post-operative pain       amylase-lipase-protease [address] units Cpep per EC capsule    CREON    450 capsule    Take 2-3 with meals / 1-2 with snacks, up to 15 per day.    Necrotizing pancreatitis       blood glucose monitoring test strip    ONETOUCH VERIO IQ    100 each    Use to test blood sugar 4 times daily or as directed.    Type 2 diabetes mellitus with diabetic polyneuropathy, with long-term current use of insulin (H)       capecitabine 500 MG tablet CHEMO    XELODA    126 tablet    Take 3 tablets (1,500 mg) by mouth 2 times daily for 21 days Days 1 through 21, then 7 days off. Take within 30 mins after a meal.    Pancreatic adenocarcinoma (H)       docusate sodium 100 MG capsule    COLACE    60 capsule    Take 1 capsule (100 mg) by mouth daily    Other constipation       insulin  UNIT/ML injection    HumuLIN N/NovoLIN N    3 vial    Inject 10 units twice daily on infusion days, 5 units twice daily on all other days.    Type 2 diabetes mellitus with diabetic polyneuropathy, with long-term current use of insulin (H)       insulin syringes (disposable) U-100 0.3 ML Misc     1 each    Inject 10 units twice daily on infusion days, 5 units twice daily all other days    Type 2 diabetes mellitus with diabetic polyneuropathy, with long-term current use of insulin (H)       LORazepam 0.5 MG tablet    ATIVAN    30 tablet    Take 1 tablet (0.5 mg) by mouth every 4 hours as needed (Anxiety, Nausea/Vomiting or Sleep)    Pancreatic adenocarcinoma (H)       ondansetron 8 MG tablet    ZOFRAN    30 tablet    Take 1 tablet (8 mg) by mouth every 8 hours as needed for nausea    Pancreatic adenocarcinoma (H)       ONETOUCH DELICA LANCETS 33G Misc     100 each    4 lancets daily     Type 2 diabetes mellitus with diabetic polyneuropathy, with long-term current use of insulin (H)       pantoprazole 40 MG EC tablet    PROTONIX    90 tablet    Take 1 tablet (40 mg) by mouth every morning    Pancreatic adenocarcinoma (H)       polyethylene glycol Packet    MIRALAX/GLYCOLAX    30 packet    Take 17 g by mouth daily    Drug-induced constipation       prochlorperazine 10 MG tablet    COMPAZINE    30 tablet    Take 1 tablet (10 mg) by mouth every 6 hours as needed (Nausea/Vomiting)    Pancreatic adenocarcinoma (H)

## 2018-06-12 NOTE — NURSING NOTE
"Oncology Rooming Note    June 12, 2018 8:14 AM   Kitty Bangura is a 57 year old female who presents for:    Chief Complaint   Patient presents with     Port Draw     port accessed and labs drawn by rn.  vs taken.     Oncology Clinic Visit     Return vsiit related to Pancreatic Cancer     Initial Vitals: /65 (BP Location: Right arm, Patient Position: Sitting, Cuff Size: Adult Regular)  Pulse 76  Temp 97.4  F (36.3  C) (Oral)  Resp 16  Ht 1.651 m (5' 5\")  Wt 67.1 kg (147 lb 14.4 oz)  SpO2 99%  BMI 24.61 kg/m2 Estimated body mass index is 24.61 kg/(m^2) as calculated from the following:    Height as of this encounter: 1.651 m (5' 5\").    Weight as of this encounter: 67.1 kg (147 lb 14.4 oz). Body surface area is 1.75 meters squared.  No Pain (0) Comment: Data Unavailable   No LMP recorded. Patient is postmenopausal.  Allergies reviewed: Yes  Medications reviewed: Yes    Medications: Medication refills not needed today.  Pharmacy name entered into [employer]:    [facility] - [place], [place] - [address] [place] [address] [facility] - [place] - [address] AT [facility] [place] [address] [place] [address]    Clinical concerns: No new concerns. Provider was notified.    10 minutes for nursing intake (face to face time)     Es Seth LPN            "

## 2018-06-12 NOTE — NURSING NOTE
Port de-access per provider. Site WNL. Bandage placed over site. Patient tolerated well.     Joie Herzog RN

## 2018-06-12 NOTE — PATIENT INSTRUCTIONS
1.  I will send your blood sugars to Dr. Gallo and we will contact you about insulin changes.    2.  Keep counting your carbs and making healthy food choices.    3.  Keep walking.  Carry glucose tablets with you.    4.  Write down blood sugars and export to e-mail for follow up on blood sugars.    5.  Follow up in one month for more education.    Yeni Mota RN,CDE  [address] [address]  Phone: [phone] or [phone]  [email or web address]

## 2018-06-12 NOTE — LETTER
6/12/2018      RE: Kitty Bangura  [address] [facility] [address]       Oncology/Hematology Visit Note  Jun 12, 2018    Reason for Visit: Follow up of resectable pancreatic cancer    History of Present Illness: Staging CT chest/abd/pelvis on 12/9/17 showed several small pulmonary nodules (largest 3mm), unchanged mass in the pancreatic tail, mildly prominent mesenteric nodes thought likely reactive, and small right hepatic lobe hypodensities. Abdominal MRI on 12/10/17 showed hepatic hemangiomas, no evidence of metastatic disease to the liver.   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     Kitty met with Dr Monk and discussed neoadjuvant chemotherapy with FOLFIRINOX, then surgery and then adjuvant chemotherapy. She was seen on 1/15/18 for cycle 1 and then hospitalized for pancreatitis on 1/20/18 where she was managed with IV fluds and pain control. GI took her to the OR to attempt pancreatic duct stent placement but unfortunately it was completely obstructed. They were able to place a pancreatic stent and perform an EUS cyst-gastrostomy with plans to repeat Xray a month following to ensure stent passage. She was d/c 1/27. Cycle 2 was given 2/5/18 (a week delayed due to hospitalization).     She went to ED on 2/11 with worsening abdominal pain and was worried about pancreatitis. W/u with CT and labs including lipase were negative. She had elevated WBC of 33K but had Neulasta on 2/8.     Cycle 3 was given on 2/20 with dose-reduction (10%) of oxaliplatin due to significant cold sensitivity and motor neuropathy. Complicated by bronchitis.    Cycle 4 was given 3/8/18. She went on to have distal pancreatotomy and splenectomy and cholecystectomy on 4/17/18. Final pathology showed complete pathologic response, 26 benign lymph nodes, and necrotizing pancreatitis.     She started adjuvant chemotherapy with Xeloda  and gemcitabine 5/30/18. She had baseline CT on 5/22/18 showing no evidence of recurrent disease.     Interval History:  Mrs. Bangura returns to clinic today with her  prior to cycle 1 day 15. She has tolerated gem/xeloda fairly well. She feels tired for a few days after infusions. Had some mucositis start and has been using salt and soda rinses regularly. This is helping some. She has had mild nausea and only needed 1 antiemetic weekly. Her constipation has improved with using colace. She is having 1-2 soft BMs daily. Rectal irritation is feeling better. No diarrhea or HFS. She has had a few episodes of post-prandial pain which resolves in about 20 minutes. Has some indigestion as well. Stopped pantoprazole about a month ago as the Rx was done. Her BS are still up and down, lowest 122. Had as high as 500 after chemo. No recent fevers/chills or infectious concerns. She is still walking several miles daily.     Review of Systems:  Patient denies fevers, chills, night sweats, unexplained weight changes, headaches, dizziness, vision or hearing changes, new lumps or bumps, chest pain, shortness of breath, cough, abdominal pain, nausea, vomiting, changes to bowel or bladder, swelling of extremities, bleeding issues, or rash.    Current Outpatient Prescriptions   Medication Sig Dispense Refill     amylase-lipase-protease (CREON) 34366-99[address] UNITS CPEP per EC capsule Take 2-3 with meals / 1-2 with snacks, up to 15 per day. 450 capsule 6     blood glucose monitoring (ONETOUCH VERIO IQ) test strip Use to test blood sugar 4 times daily or as directed. 100 each 3     capecitabine (XELODA) 500 MG tablet CHEMO Take 3 tablets (1,500 mg) by mouth 2 times daily for 21 days Days 1 through 21, then 7 days off. Take within 30 mins after a meal. 126 tablet 0     docusate sodium (COLACE) 100 MG capsule Take 1 capsule (100 mg) by mouth daily 60 capsule 1     insulin NPH (HUMULIN N/NOVOLIN N) 100 UNIT/ML injection Inject 10 units  "twice daily on infusion days, 5 units twice daily on all other days. 3 vial 3     insulin syringes, disposable, U-100 0.3 ML MISC Inject 10 units twice daily on infusion days, 5 units twice daily all other days 1 each 3     ondansetron (ZOFRAN) 8 MG tablet Take 1 tablet (8 mg) by mouth every 8 hours as needed for nausea 30 tablet 0     ONETOUCH DELICA LANCETS 33G MISC 4 lancets daily 100 each 3     pantoprazole (PROTONIX) 40 MG EC tablet Take 1 tablet (40 mg) by mouth every morning 90 tablet 1     acetaminophen (TYLENOL) 500 MG tablet Take 2 tablets (1,000 mg) by mouth every 8 hours (Patient not taking: Reported on 6/5/2018) 100 tablet 1     LORazepam (ATIVAN) 0.5 MG tablet Take 1 tablet (0.5 mg) by mouth every 4 hours as needed (Anxiety, Nausea/Vomiting or Sleep) (Patient not taking: Reported on 6/5/2018) 30 tablet 2     polyethylene glycol (MIRALAX/GLYCOLAX) Packet Take 17 g by mouth daily (Patient not taking: Reported on 6/5/2018) 30 packet 0     prochlorperazine (COMPAZINE) 10 MG tablet Take 1 tablet (10 mg) by mouth every 6 hours as needed (Nausea/Vomiting) (Patient not taking: Reported on 6/5/2018) 30 tablet 2       Physical Examination:  /65 (BP Location: Right arm, Patient Position: Sitting, Cuff Size: Adult Regular)  Pulse 76  Temp 97.4  F (36.3  C) (Oral)  Resp 16  Ht 1.651 m (5' 5\")  Wt 67.1 kg (147 lb 14.4 oz)  SpO2 99%  BMI 24.61 kg/m2  Wt Readings from Last 10 Encounters:   06/12/18 67.1 kg (147 lb 14.4 oz)   06/05/18 67.7 kg (149 lb 4 oz)   06/05/18 67.7 kg (149 lb 3.2 oz)   05/30/18 68.9 kg (152 lb)   05/14/18 68.7 kg (151 lb 8 oz)   05/14/18 68.7 kg (151 lb 8 oz)   05/08/18 68 kg (150 lb)   05/04/18 69.4 kg (153 lb)   04/30/18 69.9 kg (154 lb)   04/21/18 74.8 kg (164 lb 12.8 oz)     Constitutional: Well-appearing female in no acute distress.  Eyes: EOMI, PERRL. No scleral icterus.  ENT: Oral mucosa is moist without thrush but she has some irritative mucositis buccal mucosa b/l and " irritation L lower anterior gingiva   Lymphatic: Neck is supple without cervical or supraclavicular lymphadenopathy.   Cardiovascular: Regular rate and rhythm. No murmurs, gallops, or rubs. No peripheral edema.  Respiratory: Clear to auscultation bilaterally. No wheezes or crackles.   Gastrointestinal: Bowel sounds present. Abdomen soft, nontender, nondistended. No palpable hepatosplenomegaly or masses.   Neurologic: Cranial nerves II through XII are grossly intact.  Skin: No rashes, petechiae, or bruising noted on exposed skin.    Laboratory Data:   6/12/2018 08:03   WBC 3.9 (L)   Hemoglobin 12.4   Hematocrit 37.9   Platelet Count 142 (L)   RBC Count 4.02   MCV 94   MCH 30.8   MCHC 32.7   RDW 13.2   Diff Method Automated Method   % Neutrophils 17.9   % Lymphocytes 74.9   % Monocytes 5.7   % Eosinophils 1.0   % Basophils 0.5   % Immature Granulocytes 0.0   Nucleated RBCs 2 (H)   Absolute Neutrophil 0.7 (L)   Absolute Lymphocytes 2.9   Absolute Monocytes 0.2   Absolute Eosinophils 0.0   Absolute Basophils 0.0   Abs Immature Granulocytes 0.0   Absolute Nucleated RBC 0.1       Assessment and Plan:  1. Pancreatic cancer  S/p 4 cycles of neoadjuvant  FOLFIRINOX with good response so she was able to undergo distal pancreatectomy and splenectomy. Her pathology showed complete pathologic response. She started adjuvant gem/xeloda 2 weeks ago and is tolerating well with mild symptoms. Unfortunately, her ANC today is only 700 so will cancel treatment this week and follow-up in 2 weeks prior to cycle 2. Plan for 4 cycles followed by surveillance. She will call with any interval concerns.     2. DM: Secondary to prediabetes, family history, and now distal pancreatectomy. She saw endocrinology last week and was started on NPH with dosing for steroids. Since her nausea has been minimal, I should be able to decrease dexamethasone to 8 mg starting with cycle 2. She has follow-up with diabetes educator today.     3. Rectal  irritation: Continue colace 1-2 tabs daily.    4. Epigastric pain with indigestion: Resume pantoprazole 40 mg daily. Discussed using prn TUMs.     Kellie Nobles PA-C  [facility]  [address], [place] [id number] [phone]

## 2018-06-12 NOTE — Clinical Note
David Gallo, Here are the blood sugars you requested to see in a week.  I did not change anything but am happy to contact Kitty if you want to increase the insulin.  She has just been taking the NPH 5 units bid. Let me know. Thanks! Yeni

## 2018-06-14 ENCOUNTER — MYC MEDICAL ADVICE (OUTPATIENT)
Dept: EDUCATION SERVICES | Facility: CLINIC | Age: 58
End: 2018-06-14

## 2018-06-18 ENCOUNTER — TELEPHONE (OUTPATIENT)
Dept: EDUCATION SERVICES | Facility: CLINIC | Age: 58
End: 2018-06-18

## 2018-06-18 DIAGNOSIS — R73.9 HYPERGLYCEMIA: Primary | ICD-10-CM

## 2018-06-18 DIAGNOSIS — C25.9 MALIGNANT NEOPLASM OF PANCREAS, UNSPECIFIED LOCATION OF MALIGNANCY (H): ICD-10-CM

## 2018-06-18 NOTE — TELEPHONE ENCOUNTER
Good Morning Yeni -    Attached are last weeks blood sugar readings. Things are starting to level out a bit and be more consistent.     Did we need an appointment for education next month or is that being combined with Dr. Fine appointment?    Also, I called pharmacy and my insurance re: pens vs vials.  Apparently they will cover the pens. The pharmacist did not transfer the prescrip to [facility] pharmacy correctly for the pens. I have a 3 month supply in vials. Will need a new prescription for pens sent to get those next time.    At least I know I can get them.  Have a good week!    --   Kitty Bangura    Reply:    David Tang,   Thanks for sending these over.  They are improving!        Let's increase the second dose of insulin to 8 units.  That should improve the bedtime readings just a bit.      I sent a new prescription to the [facility] for the NPH Kwikpens and pen needles.        I could see you at 1:30 on 7/9.  You have a noon appointment with Dr. Gallo.  Will that work?      Yeni

## 2018-06-19 ENCOUNTER — TELEPHONE (OUTPATIENT)
Dept: ENDOCRINOLOGY | Facility: CLINIC | Age: 58
End: 2018-06-19

## 2018-06-19 ENCOUNTER — TELEPHONE (OUTPATIENT)
Dept: FAMILY MEDICINE | Facility: CLINIC | Age: 58
End: 2018-06-19

## 2018-06-19 NOTE — TELEPHONE ENCOUNTER
Central Prior Authorization Team   Phone: [phone]      PA ALREADY DENIED    Medication: HUMULIN N PEN- PA WAS ALREADY DENIED  Insurance Company: [employer] - Phone [phone] Fax [phone]  Pharmacy Filling the Rx: [facility] [place], [place] - [address]  Filling Pharmacy Phone: [phone]  Filling Pharmacy Fax:    Start Date: 6/19/2018    PLEASE SEE ENCOUNTER 6/5/2018 .

## 2018-06-19 NOTE — TELEPHONE ENCOUNTER
Pa for humulin n was denied. Please send alternate medication. Thanks!    Sherita Salinas, [facility] [place]  [phone]

## 2018-06-26 ENCOUNTER — APPOINTMENT (OUTPATIENT)
Dept: LAB | Facility: CLINIC | Age: 58
End: 2018-06-26
Attending: INTERNAL MEDICINE
Payer: COMMERCIAL

## 2018-06-26 ENCOUNTER — INFUSION THERAPY VISIT (OUTPATIENT)
Dept: ONCOLOGY | Facility: CLINIC | Age: 58
End: 2018-06-26
Attending: PHYSICIAN ASSISTANT
Payer: COMMERCIAL

## 2018-06-26 VITALS
OXYGEN SATURATION: 96 % | BODY MASS INDEX: 25.48 KG/M2 | HEART RATE: 69 BPM | SYSTOLIC BLOOD PRESSURE: 100 MMHG | DIASTOLIC BLOOD PRESSURE: 64 MMHG | RESPIRATION RATE: 16 BRPM | WEIGHT: 153.1 LBS | TEMPERATURE: 97.8 F

## 2018-06-26 DIAGNOSIS — C25.9 PANCREATIC ADENOCARCINOMA (H): Primary | ICD-10-CM

## 2018-06-26 LAB
ALBUMIN SERPL-MCNC: 3.5 G/DL (ref 3.4–5)
ALP SERPL-CCNC: 110 U/L (ref 40–150)
ALT SERPL W P-5'-P-CCNC: 23 U/L (ref 0–50)
ANION GAP SERPL CALCULATED.3IONS-SCNC: 7 MMOL/L (ref 3–14)
AST SERPL W P-5'-P-CCNC: 25 U/L (ref 0–45)
BASOPHILS # BLD AUTO: 0.1 10E9/L (ref 0–0.2)
BASOPHILS NFR BLD AUTO: 1.1 %
BILIRUB SERPL-MCNC: 0.2 MG/DL (ref 0.2–1.3)
BUN SERPL-MCNC: 14 MG/DL (ref 7–30)
CALCIUM SERPL-MCNC: 9 MG/DL (ref 8.5–10.1)
CHLORIDE SERPL-SCNC: 108 MMOL/L (ref 94–109)
CO2 SERPL-SCNC: 27 MMOL/L (ref 20–32)
CREAT SERPL-MCNC: 0.6 MG/DL (ref 0.52–1.04)
DIFFERENTIAL METHOD BLD: ABNORMAL
EOSINOPHIL # BLD AUTO: 0.2 10E9/L (ref 0–0.7)
EOSINOPHIL NFR BLD AUTO: 2.7 %
ERYTHROCYTE [DISTWIDTH] IN BLOOD BY AUTOMATED COUNT: 14.9 % (ref 10–15)
GFR SERPL CREATININE-BSD FRML MDRD: >90 ML/MIN/1.7M2
GLUCOSE SERPL-MCNC: 92 MG/DL (ref 70–99)
HCT VFR BLD AUTO: 37.3 % (ref 35–47)
HGB BLD-MCNC: 12 G/DL (ref 11.7–15.7)
IMM GRANULOCYTES # BLD: 0 10E9/L (ref 0–0.4)
IMM GRANULOCYTES NFR BLD: 0.1 %
LYMPHOCYTES # BLD AUTO: 3.4 10E9/L (ref 0.8–5.3)
LYMPHOCYTES NFR BLD AUTO: 46.8 %
MCH RBC QN AUTO: 30.5 PG (ref 26.5–33)
MCHC RBC AUTO-ENTMCNC: 32.2 G/DL (ref 31.5–36.5)
MCV RBC AUTO: 95 FL (ref 78–100)
MONOCYTES # BLD AUTO: 1.5 10E9/L (ref 0–1.3)
MONOCYTES NFR BLD AUTO: 20.5 %
NEUTROPHILS # BLD AUTO: 2.1 10E9/L (ref 1.6–8.3)
NEUTROPHILS NFR BLD AUTO: 28.8 %
NRBC # BLD AUTO: 0 10*3/UL
NRBC BLD AUTO-RTO: 0 /100
PLATELET # BLD AUTO: 687 10E9/L (ref 150–450)
PLATELET # BLD EST: ABNORMAL 10*3/UL
POTASSIUM SERPL-SCNC: 4.1 MMOL/L (ref 3.4–5.3)
PROT SERPL-MCNC: 7.1 G/DL (ref 6.8–8.8)
RBC # BLD AUTO: 3.94 10E12/L (ref 3.8–5.2)
SODIUM SERPL-SCNC: 142 MMOL/L (ref 133–144)
WBC # BLD AUTO: 7.3 10E9/L (ref 4–11)

## 2018-06-26 PROCEDURE — 25000128 H RX IP 250 OP 636: Mod: ZF | Performed by: PHYSICIAN ASSISTANT

## 2018-06-26 PROCEDURE — G0463 HOSPITAL OUTPT CLINIC VISIT: HCPCS | Mod: ZF

## 2018-06-26 PROCEDURE — 96375 TX/PRO/DX INJ NEW DRUG ADDON: CPT

## 2018-06-26 PROCEDURE — 96413 CHEMO IV INFUSION 1 HR: CPT

## 2018-06-26 PROCEDURE — 80053 COMPREHEN METABOLIC PANEL: CPT | Performed by: PHYSICIAN ASSISTANT

## 2018-06-26 PROCEDURE — 99214 OFFICE O/P EST MOD 30 MIN: CPT | Mod: ZP | Performed by: PHYSICIAN ASSISTANT

## 2018-06-26 PROCEDURE — 85025 COMPLETE CBC W/AUTO DIFF WBC: CPT | Performed by: PHYSICIAN ASSISTANT

## 2018-06-26 RX ORDER — MEPERIDINE HYDROCHLORIDE 25 MG/ML
25 INJECTION INTRAMUSCULAR; INTRAVENOUS; SUBCUTANEOUS EVERY 30 MIN PRN
Status: CANCELLED | OUTPATIENT
Start: 2018-06-26

## 2018-06-26 RX ORDER — LORAZEPAM 2 MG/ML
0.5 INJECTION INTRAMUSCULAR EVERY 4 HOURS PRN
Status: CANCELLED
Start: 2018-07-03

## 2018-06-26 RX ORDER — LORAZEPAM 2 MG/ML
0.5 INJECTION INTRAMUSCULAR EVERY 4 HOURS PRN
Status: CANCELLED
Start: 2018-07-09

## 2018-06-26 RX ORDER — LORAZEPAM 2 MG/ML
0.5 INJECTION INTRAMUSCULAR EVERY 4 HOURS PRN
Status: CANCELLED
Start: 2018-06-26

## 2018-06-26 RX ORDER — EPINEPHRINE 0.3 MG/.3ML
0.3 INJECTION SUBCUTANEOUS EVERY 5 MIN PRN
Status: CANCELLED | OUTPATIENT
Start: 2018-07-09

## 2018-06-26 RX ORDER — SODIUM CHLORIDE 9 MG/ML
1000 INJECTION, SOLUTION INTRAVENOUS CONTINUOUS PRN
Status: CANCELLED
Start: 2018-06-26

## 2018-06-26 RX ORDER — ALBUTEROL SULFATE 0.83 MG/ML
2.5 SOLUTION RESPIRATORY (INHALATION)
Status: CANCELLED | OUTPATIENT
Start: 2018-07-09

## 2018-06-26 RX ORDER — SODIUM CHLORIDE 9 MG/ML
1000 INJECTION, SOLUTION INTRAVENOUS CONTINUOUS PRN
Status: CANCELLED
Start: 2018-07-09

## 2018-06-26 RX ORDER — HEPARIN SODIUM (PORCINE) LOCK FLUSH IV SOLN 100 UNIT/ML 100 UNIT/ML
5 SOLUTION INTRAVENOUS ONCE
Status: CANCELLED
Start: 2018-07-03 | End: 2018-07-03

## 2018-06-26 RX ORDER — ALBUTEROL SULFATE 0.83 MG/ML
2.5 SOLUTION RESPIRATORY (INHALATION)
Status: CANCELLED | OUTPATIENT
Start: 2018-07-03

## 2018-06-26 RX ORDER — CAPECITABINE 500 MG/1
830 TABLET, FILM COATED ORAL 2 TIMES DAILY
Qty: 126 TABLET | Refills: 0 | Status: SHIPPED | OUTPATIENT
Start: 2018-06-26 | End: 2018-07-17

## 2018-06-26 RX ORDER — ALBUTEROL SULFATE 90 UG/1
1-2 AEROSOL, METERED RESPIRATORY (INHALATION)
Status: CANCELLED
Start: 2018-07-09

## 2018-06-26 RX ORDER — SODIUM CHLORIDE 9 MG/ML
1000 INJECTION, SOLUTION INTRAVENOUS CONTINUOUS PRN
Status: CANCELLED
Start: 2018-07-03

## 2018-06-26 RX ORDER — HEPARIN SODIUM (PORCINE) LOCK FLUSH IV SOLN 100 UNIT/ML 100 UNIT/ML
5 SOLUTION INTRAVENOUS ONCE
Status: CANCELLED
Start: 2018-06-26 | End: 2018-06-26

## 2018-06-26 RX ORDER — EPINEPHRINE 1 MG/ML
0.3 INJECTION, SOLUTION INTRAMUSCULAR; SUBCUTANEOUS EVERY 5 MIN PRN
Status: CANCELLED | OUTPATIENT
Start: 2018-07-09

## 2018-06-26 RX ORDER — ALBUTEROL SULFATE 0.83 MG/ML
2.5 SOLUTION RESPIRATORY (INHALATION)
Status: CANCELLED | OUTPATIENT
Start: 2018-06-26

## 2018-06-26 RX ORDER — METHYLPREDNISOLONE SODIUM SUCCINATE 125 MG/2ML
125 INJECTION, POWDER, LYOPHILIZED, FOR SOLUTION INTRAMUSCULAR; INTRAVENOUS
Status: CANCELLED
Start: 2018-07-03

## 2018-06-26 RX ORDER — HEPARIN SODIUM (PORCINE) LOCK FLUSH IV SOLN 100 UNIT/ML 100 UNIT/ML
5 SOLUTION INTRAVENOUS ONCE
Status: CANCELLED
Start: 2018-07-09 | End: 2018-07-10

## 2018-06-26 RX ORDER — MEPERIDINE HYDROCHLORIDE 25 MG/ML
25 INJECTION INTRAMUSCULAR; INTRAVENOUS; SUBCUTANEOUS EVERY 30 MIN PRN
Status: CANCELLED | OUTPATIENT
Start: 2018-07-09

## 2018-06-26 RX ORDER — ALBUTEROL SULFATE 90 UG/1
1-2 AEROSOL, METERED RESPIRATORY (INHALATION)
Status: CANCELLED
Start: 2018-07-03

## 2018-06-26 RX ORDER — METHYLPREDNISOLONE SODIUM SUCCINATE 125 MG/2ML
125 INJECTION, POWDER, LYOPHILIZED, FOR SOLUTION INTRAMUSCULAR; INTRAVENOUS
Status: CANCELLED
Start: 2018-06-26

## 2018-06-26 RX ORDER — EPINEPHRINE 0.3 MG/.3ML
0.3 INJECTION SUBCUTANEOUS EVERY 5 MIN PRN
Status: CANCELLED | OUTPATIENT
Start: 2018-06-26

## 2018-06-26 RX ORDER — DIPHENHYDRAMINE HYDROCHLORIDE 50 MG/ML
50 INJECTION INTRAMUSCULAR; INTRAVENOUS
Status: CANCELLED
Start: 2018-07-03

## 2018-06-26 RX ORDER — ALBUTEROL SULFATE 90 UG/1
1-2 AEROSOL, METERED RESPIRATORY (INHALATION)
Status: CANCELLED
Start: 2018-06-26

## 2018-06-26 RX ORDER — MEPERIDINE HYDROCHLORIDE 25 MG/ML
25 INJECTION INTRAMUSCULAR; INTRAVENOUS; SUBCUTANEOUS EVERY 30 MIN PRN
Status: CANCELLED | OUTPATIENT
Start: 2018-07-03

## 2018-06-26 RX ORDER — EPINEPHRINE 1 MG/ML
0.3 INJECTION, SOLUTION INTRAMUSCULAR; SUBCUTANEOUS EVERY 5 MIN PRN
Status: CANCELLED | OUTPATIENT
Start: 2018-07-03

## 2018-06-26 RX ORDER — HEPARIN SODIUM (PORCINE) LOCK FLUSH IV SOLN 100 UNIT/ML 100 UNIT/ML
5 SOLUTION INTRAVENOUS
Status: COMPLETED | OUTPATIENT
Start: 2018-06-26 | End: 2018-06-26

## 2018-06-26 RX ORDER — EPINEPHRINE 0.3 MG/.3ML
0.3 INJECTION SUBCUTANEOUS EVERY 5 MIN PRN
Status: CANCELLED | OUTPATIENT
Start: 2018-07-03

## 2018-06-26 RX ORDER — DIPHENHYDRAMINE HYDROCHLORIDE 50 MG/ML
50 INJECTION INTRAMUSCULAR; INTRAVENOUS
Status: CANCELLED
Start: 2018-07-09

## 2018-06-26 RX ORDER — METHYLPREDNISOLONE SODIUM SUCCINATE 125 MG/2ML
125 INJECTION, POWDER, LYOPHILIZED, FOR SOLUTION INTRAMUSCULAR; INTRAVENOUS
Status: CANCELLED
Start: 2018-07-09

## 2018-06-26 RX ORDER — HEPARIN SODIUM (PORCINE) LOCK FLUSH IV SOLN 100 UNIT/ML 100 UNIT/ML
5 SOLUTION INTRAVENOUS ONCE
Status: COMPLETED | OUTPATIENT
Start: 2018-06-26 | End: 2018-06-26

## 2018-06-26 RX ORDER — DIPHENHYDRAMINE HYDROCHLORIDE 50 MG/ML
50 INJECTION INTRAMUSCULAR; INTRAVENOUS
Status: CANCELLED
Start: 2018-06-26

## 2018-06-26 RX ORDER — EPINEPHRINE 1 MG/ML
0.3 INJECTION, SOLUTION INTRAMUSCULAR; SUBCUTANEOUS EVERY 5 MIN PRN
Status: CANCELLED | OUTPATIENT
Start: 2018-06-26

## 2018-06-26 RX ADMIN — SODIUM CHLORIDE 250 ML: 9 INJECTION, SOLUTION INTRAVENOUS at 08:22

## 2018-06-26 RX ADMIN — HEPARIN 5 ML: 100 SYRINGE at 09:37

## 2018-06-26 RX ADMIN — DEXAMETHASONE SODIUM PHOSPHATE 8 MG: 10 INJECTION, SOLUTION INTRAMUSCULAR; INTRAVENOUS at 08:25

## 2018-06-26 RX ADMIN — SODIUM CHLORIDE, PRESERVATIVE FREE 5 ML: 5 INJECTION INTRAVENOUS at 06:30

## 2018-06-26 RX ADMIN — GEMCITABINE 1800 MG: 38 INJECTION, SOLUTION INTRAVENOUS at 08:59

## 2018-06-26 ASSESSMENT — PAIN SCALES - GENERAL: PAINLEVEL: NO PAIN (0)

## 2018-06-26 NOTE — PROGRESS NOTES
Oncology/Hematology Visit Note  Jun 26, 2018    Reason for Visit: Follow up of resectable pancreatic cancer    History of Present Illness: Staging CT chest/abd/pelvis on 12/9/17 showed several small pulmonary nodules (largest 3mm), unchanged mass in the pancreatic tail, mildly prominent mesenteric nodes thought likely reactive, and small right hepatic lobe hypodensities. Abdominal MRI on 12/10/17 showed hepatic hemangiomas, no evidence of metastatic disease to the liver.   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     Kitty met with Dr Monk and discussed neoadjuvant chemotherapy with FOLFIRINOX, then surgery and then adjuvant chemotherapy. She was seen on 1/15/18 for cycle 1 and then hospitalized for pancreatitis on 1/20/18 where she was managed with IV fluds and pain control. GI took her to the OR to attempt pancreatic duct stent placement but unfortunately it was completely obstructed. They were able to place a pancreatic stent and perform an EUS cyst-gastrostomy with plans to repeat Xray a month following to ensure stent passage. She was d/c 1/27. Cycle 2 was given 2/5/18 (a week delayed due to hospitalization).     She went to ED on 2/11 with worsening abdominal pain and was worried about pancreatitis. W/u with CT and labs including lipase were negative. She had elevated WBC of 33K but had Neulasta on 2/8.     Cycle 3 was given on 2/20 with dose-reduction (10%) of oxaliplatin due to significant cold sensitivity and motor neuropathy. Complicated by bronchitis.    Cycle 4 was given 3/8/18. She went on to have distal pancreatotomy and splenectomy and cholecystectomy on 4/17/18. Final pathology showed complete pathologic response, 26 benign lymph nodes, and necrotizing pancreatitis.     She started adjuvant chemotherapy with Xeloda and gemcitabine 5/30/18. She had baseline CT on 5/22/18 showing no evidence of  recurrent disease.     Interval History:  Mrs. Bangura returns to clinic today with her  prior to cycle 2 day 1. She is feeling well today. Her mucositis recovered during the past 2 weeks. She denies any nausea. She has 1-2 bowel movements per day that are soft. She has mild rectal irritation but overall this is better. Denies any neuropathy or HFS. Post-prandial abdominal pain has resolved. She has occasional LUQ pain below costal margin intermittently, sometimes when she walks. Does not feel SOB or pleuritic. BS have been better. No recent fevers/chills or infectious concerns. She was able to golf 9 holes.     Review of Systems:  Patient denies fevers, chills, night sweats, unexplained weight changes, headaches, dizziness, vision or hearing changes, new lumps or bumps, chest pain, shortness of breath, cough, abdominal pain, nausea, vomiting, changes to bowel or bladder, swelling of extremities, bleeding issues, or rash.    Current Outpatient Prescriptions   Medication Sig Dispense Refill     acetaminophen (TYLENOL) 500 MG tablet Take 2 tablets (1,000 mg) by mouth every 8 hours (Patient not taking: Reported on 6/5/2018) 100 tablet 1     amylase-lipase-protease (CREON) [id number] UNITS CPEP per EC capsule Take 2-3 with meals / 1-2 with snacks, up to 15 per day. 450 capsule 6     blood glucose monitoring (ONETOUCH VERIO IQ) test strip Use to test blood sugar 4 times daily or as directed. 100 each 3     capecitabine (XELODA) 500 MG tablet CHEMO Take 3 tablets (1,500 mg) by mouth 2 times daily for 21 days Days 1 through 21, then 7 days off. Take within 30 mins after a meal. 126 tablet 0     docusate sodium (COLACE) 100 MG capsule Take 1 capsule (100 mg) by mouth daily 60 capsule 1     insulin NPH (HUMULIN N/NOVOLIN N) 100 UNIT/ML injection Inject 10 units twice daily on infusion days, 5 units twice daily on all other days. 3 vial 3     insulin pen needle (BD DOTTY U/F) 32G X 4 MM Use 2 daily as directed. 100  each 11     insulin syringes, disposable, U-100 0.3 ML MISC Inject 10 units twice daily on infusion days, 5 units twice daily all other days 1 each 3     LORazepam (ATIVAN) 0.5 MG tablet Take 1 tablet (0.5 mg) by mouth every 4 hours as needed (Anxiety, Nausea/Vomiting or Sleep) (Patient not taking: Reported on 6/5/2018) 30 tablet 2     ondansetron (ZOFRAN) 8 MG tablet Take 1 tablet (8 mg) by mouth every 8 hours as needed for nausea (Patient not taking: Reported on 6/26/2018) 30 tablet 0     ONETOUCH DELICA LANCETS 33G MISC 4 lancets daily 100 each 3     pantoprazole (PROTONIX) 40 MG EC tablet Take 1 tablet (40 mg) by mouth every morning 90 tablet 1     polyethylene glycol (MIRALAX/GLYCOLAX) Packet Take 17 g by mouth daily (Patient not taking: Reported on 6/5/2018) 30 packet 0     prochlorperazine (COMPAZINE) 10 MG tablet Take 1 tablet (10 mg) by mouth every 6 hours as needed (Nausea/Vomiting) (Patient not taking: Reported on 6/5/2018) 30 tablet 2       Physical Examination:  /64 (BP Location: Right arm, Patient Position: Sitting, Cuff Size: Adult Regular)  Pulse 69  Temp 97.8  F (36.6  C) (Oral)  Resp 16  Wt 69.4 kg (153 lb 1.6 oz)  SpO2 96%  BMI 25.48 kg/m2  Wt Readings from Last 10 Encounters:   06/26/18 69.4 kg (153 lb 1.6 oz)   06/12/18 67.1 kg (147 lb 14.4 oz)   06/05/18 67.7 kg (149 lb 4 oz)   06/05/18 67.7 kg (149 lb 3.2 oz)   05/30/18 68.9 kg (152 lb)   05/14/18 68.7 kg (151 lb 8 oz)   05/14/18 68.7 kg (151 lb 8 oz)   05/08/18 68 kg (150 lb)   05/04/18 69.4 kg (153 lb)   04/30/18 69.9 kg (154 lb)     Constitutional: Well-appearing female in no acute distress.  Eyes: EOMI, PERRL. No scleral icterus.  ENT: Oral mucosa is moist without thrush or mucositis.  Lymphatic: Neck is supple without cervical or supraclavicular lymphadenopathy.   Cardiovascular: Regular rate and rhythm. No murmurs, gallops, or rubs. No peripheral edema.  Respiratory: Clear to auscultation bilaterally. No wheezes or crackles.    Gastrointestinal: Bowel sounds present. Abdomen soft, nontender, nondistended. No palpable hepatosplenomegaly or masses.   Neurologic: Cranial nerves II through XII are grossly intact.  Skin: No rashes, petechiae, or bruising noted on exposed skin.    Laboratory Data:   6/26/2018 06:35   Sodium 142   Potassium 4.1   Chloride 108   Carbon Dioxide 27   Urea Nitrogen 14   Creatinine 0.60   GFR Estimate >90   GFR Estimate If Black >90   Calcium 9.0   Anion Gap 7   Albumin 3.5   Protein Total 7.1   Bilirubin Total 0.2   Alkaline Phosphatase 110   ALT 23   AST 25   Glucose 92   WBC 7.3   Hemoglobin 12.0   Hematocrit 37.3   Platelet Count 687 (H)   RBC Count 3.94   MCV 95   MCH 30.5   MCHC 32.2   RDW 14.9   Diff Method Automated Method   % Neutrophils 28.8   % Lymphocytes 46.8   % Monocytes 20.5   % Eosinophils 2.7   % Basophils 1.1   % Immature Granulocytes 0.1   Nucleated RBCs 0   Absolute Neutrophil 2.1   Absolute Lymphocytes 3.4   Absolute Monocytes 1.5 (H)   Absolute Eosinophils 0.2   Absolute Basophils 0.1   Abs Immature Granulocytes 0.0   Absolute Nucleated RBC 0.0   Platelet Estimate Confirming automa...       Assessment and Plan:  1. Pancreatic cancer  S/p 4 cycles of neoadjuvant  FOLFIRINOX with good response so she was able to undergo distal pancreatectomy and splenectomy. Her pathology showed complete pathologic response. She started adjuvant gem/xeloda 1 month ago and is tolerating well with mild symptoms. Feels well today to proceed with cycle 2. Plan for 4 cycles followed by surveillance. She will call with any interval concerns.     2. DM: Secondary to prediabetes, family history, and now distal pancreatectomy. She saw endocrinology and was started on NPH with dosing for steroids. Will decrease dex to 8 mg given mild nausea.     3. Rectal irritation: Continue colace 1-2 tabs daily.    4. Epigastric pain with indigestion: Resumed pantoprazole 40 mg daily with improvement. Discussed using prn TUMs.      Kellie Nobles PA-C  [facility]  [address] [place], [place] [address] [phone]

## 2018-06-26 NOTE — MR AVS SNAPSHOT
After Visit Summary   6/26/2018    Kitty Bangura    MRN: [id number]           Patient Information     Date Of Birth          1960        Visit Information        Provider Department      6/26/2018 8:30 AM  16 ATC;  ONCOLOGY INFUSION [facility]        Today's Diagnoses     Pancreatic adenocarcinoma (H)    -  1      Care Instructions    Contact Numbers    [facility] Main Line: [phone]  [facility] Triage and after hours / weekends / holidays:  [phone]      Please call the triage or after hours line if you experience a temperature greater than or equal to 100.5, shaking chills, have uncontrolled nausea, vomiting and/or diarrhea, dizziness, shortness of breath, chest pain, bleeding, unexplained bruising, or if you have any other new/concerning symptoms, questions or concerns.      If you are having any concerning symptoms or wish to speak to a provider before your next infusion visit, please call your care coordinator or triage to notify them so we can adequately serve you.     If you need a refill on a narcotic prescription or other medication, please call before your infusion appointment.           June 2018 Sunday Monday Tuesday Wednesday Thursday Friday Saturday                            1     2       3     4     5     [facility]    8:00 AM   (15 min.)    [facility] LAB DRAW   [facility] Lab Draw     [facility] ONC INFUSION 360    8:30 AM   (360 min.)    ONCOLOGY INFUSION   [facility] NEW DIABETES    8:45 AM   (60 min.)   Es Gallo MD   [facility] Endocrinology 6     7     8     9       10     11     12     [facility]    8:00 AM   (15 min.)    [facility] DRAW   [facility] Lab Draw     [facility] RETURN    8:25 AM   (50 min.)   Kellie Nobles PA-C   [facility] ONC INFUSION 60    9:30 AM   (60 min.)    ONCOLOGY INFUSION   [facility]     LONG   11:15 AM    (60 min.)   Yeni Mota RN   [facility] Diabetes 13     14     15     16       17     18     19     20     21     22     23       24     25     26     [facility] D[facility]    6:30 AM   (15 min.)    [facility] DRAW   [facility] Lab Draw     [facility] RETURN    6:45 AM   (50 min.)   Kellie Nobles PA-C   [facility]     [facility] ONC INFUSION 60    8:30 AM   (60 min.)    ONCOLOGY INFUSION   [facility] 27     28     29     30 July 2018 Sunday Monday Tuesday Wednesday Thursday Friday Saturday   1     2     [facility]    9:00 AM   (15 min.)    [facility] DRAW   [facility] Lab Draw     [facility] ONC INFUSION 60    9:30 AM   (60 min.)    ONCOLOGY INFUSION   [facility] 3     4     5     6     7       8     9     [facility] DRAW    9:00 AM   (15 min.)    [facility] DRAW   [facility] Lab Draw     [facility] ONC INFUSION 60    9:30 AM   (60 min.)    ONCOLOGY INFUSION   [facility]     [facility] RETURN DIABETES   11:45 AM   (30 min.)   Es Gallo MD   [facility] Endocrinology 10     11     12     13     14       15     16     17     18     19     20     21       22     23     [facility] DRAW    8:15 AM   (15 min.)    [facility] DRAW   [facility] Lab Draw     [facility] RETURN    8:25 AM   (50 min.)   Kellie Nobles PA-C   [facility]     [facility] ONC INFUSION 60    9:30 AM   (60 min.)    ONCOLOGY INFUSION   [facility] 24     25     26     27     28       29     30     [facility] D[facility]    8:45 AM   (15 min.)    [facility] DRAW   [facility] Lab Draw     [facility] ONC INFUSION 60    9:30 AM   (60 min.)    ONCOLOGY INFUSION   [facility] 31                                      Lab Results:  Recent Results (from the past 12 hour(s))   CBC with platelets differential    Collection Time: 06/26/18  6:35 AM   Result Value Ref  Range    WBC 7.3 4.0 - 11.0 10e9/L    RBC Count 3.94 3.8 - 5.2 10e12/L    Hemoglobin 12.0 11.7 - 15.7 g/dL    Hematocrit 37.3 35.0 - 47.0 %    MCV 95 78 - 100 fl    MCH 30.5 26.5 - 33.0 pg    MCHC 32.2 31.5 - 36.5 g/dL    RDW 14.9 10.0 - 15.0 %    Platelet Count 687 (H) 150 - 450 10e9/L    Diff Method Automated Method     % Neutrophils 28.8 %    % Lymphocytes 46.8 %    % Monocytes 20.5 %    % Eosinophils 2.7 %    % Basophils 1.1 %    % Immature Granulocytes 0.1 %    Nucleated RBCs 0 0 /100    Absolute Neutrophil 2.1 1.6 - 8.3 10e9/L    Absolute Lymphocytes 3.4 0.8 - 5.3 10e9/L    Absolute Monocytes 1.5 (H) 0.0 - 1.3 10e9/L    Absolute Eosinophils 0.2 0.0 - 0.7 10e9/L    Absolute Basophils 0.1 0.0 - 0.2 10e9/L    Abs Immature Granulocytes 0.0 0 - 0.4 10e9/L    Absolute Nucleated RBC 0.0     Platelet Estimate Confirming automated cell count    Comprehensive metabolic panel    Collection Time: 06/26/18  6:35 AM   Result Value Ref Range    Sodium 142 133 - 144 mmol/L    Potassium 4.1 3.4 - 5.3 mmol/L    Chloride 108 94 - 109 mmol/L    Carbon Dioxide 27 20 - 32 mmol/L    Anion Gap 7 3 - 14 mmol/L    Glucose 92 70 - 99 mg/dL    Urea Nitrogen 14 7 - 30 mg/dL    Creatinine 0.60 0.52 - 1.04 mg/dL    GFR Estimate >90 >60 mL/min/1.7m2    GFR Estimate If Black >90 >60 mL/min/1.7m2    Calcium 9.0 8.5 - 10.1 mg/dL    Bilirubin Total 0.2 0.2 - 1.3 mg/dL    Albumin 3.5 3.4 - 5.0 g/dL    Protein Total 7.1 6.8 - 8.8 g/dL    Alkaline Phosphatase 110 40 - 150 U/L    ALT 23 0 - 50 U/L    AST 25 0 - 45 U/L               Follow-ups after your visit        Your next 10 appointments already scheduled     Jul 02, 2018  9:00 AM CDT   [facility] Draw with [facility] DRAW   [facility] Lab Draw ([facility])    [address] [address] [phone]            Jul 02, 2018  9:30 AM CDT   Infusion 60 with UC ONCOLOGY INFUSION, UC 21 ATC   [facility] (  [facility])    [address]  [address]  [facility] [address]   [phone]            Jul 09, 2018  9:00 AM CDT   [facility] Lab Draw with [facility] LAB DRAW   [facility] Lab Draw ([facility])    [address]  [address]  [facility] [address]   [phone]            Jul 09, 2018  9:30 AM CDT   Infusion 60 with UC ONCOLOGY INFUSION, UC 22 ATC   [facility] ([facility])    [address]  [address]  [facility] [address]   [phone]            Jul 09, 2018 12:00 PM CDT   (Arrive by 11:45 AM)   RETURN DIABETES with Es Gallo MD   [facility] Endocrinology ([facility])    [address] [place] [address]  3rd Floor  [facility] [address]   [phone]            Jul 23, 2018  8:15 AM CDT   [facility] Draw with [facility] DRAW   [facility] Draw ([facility])    [address]  [address]  [facility] [address]   [phone]            Jul 23, 2018  8:40 AM CDT   (Arrive by 8:25 AM)   Return Visit with Kellie Nobles PA-C   [facility] ([facility])    [address] [place] [address]  [address]  [facility] [address]   [phone]            Jul 23, 2018  9:30 AM CDT   Infusion 60 with UC ONCOLOGY INFUSION   [facility] ([facility])    [address] [place] [address]  [address]  [facility] [address]   [phone]              Future tests that were ordered for you today     Open Future Orders        Priority Expected Expires Ordered    CT Chest/Abdomen/Pelvis w Contrast Routine 9/17/2018 6/26/2019 6/26/2018    Cancer antigen 19-9 Routine 9/17/2018 6/26/2019 6/26/2018            Who to contact     If you have questions or need follow up information about today's clinic visit or your schedule please contact [facility] directly at [phone].  Normal or non-critical lab and imaging results will be communicated to you by [employer], letter or phone within 4 business days after the clinic has received the results. If you do not hear from us within 7 days, please contact the clinic through [employer] or phone. If you have a critical or abnormal lab result, we will notify you by phone as soon as possible.  Submit refill requests through [employer] or call your pharmacy and they will forward the refill request to us. Please allow 3 business days for your refill to be completed.          Additional Information About Your Visit        [employer] Information     [employer] gives you secure access to your electronic health record. If you see a primary care provider, you can also send messages to your care team and make appointments. If you have questions, please call your primary care clinic.  If you do not have a primary care provider, please call [phone] and they will assist you.        Care EveryWhere ID     This is your Care EveryWhere ID. This could be used by other organizations to access your [place] medical records  [id number]         Blood Pressure from Last 3 Encounters:   06/26/18 100/64   06/12/18 106/65   06/05/18 104/73    Weight from Last 3 Encounters:   06/26/18 69.4 kg (153 lb 1.6 oz)   06/12/18 67.1 kg (147 lb 14.4 oz)   06/05/18 67.7 kg (149 lb 4 oz)              We Performed the Following     CBC with platelets differential     Comprehensive metabolic panel          Today's Medication Changes          These changes are accurate as of 6/26/18  9:46 AM.  If you have any questions, ask your nurse or doctor.               These medicines have changed or have updated prescriptions.        Dose/Directions    * capecitabine 500 MG tablet CHEMO   Commonly known as:  XELODA   This may have changed:  Another medication with the same name was added. Make sure you understand how and when to take each.   Used for:  Pancreatic  adenocarcinoma (H)        Dose:  830 mg/m2   Take 3 tablets (1,500 mg) by mouth 2 times daily for 21 days Days 1 through 21, then 7 days off. Take within 30 mins after a meal.   Quantity:  126 tablet   Refills:  0       * capecitabine 500 MG tablet CHEMO   Commonly known as:  XELODA   This may have changed:  You were already taking a medication with the same name, and this prescription was added. Make sure you understand how and when to take each.   Used for:  Pancreatic adenocarcinoma (H)        Dose:  830 mg/m2   Take 3 tablets (1,500 mg) by mouth 2 times daily for 21 days Days 1 through 21, then 7 days off. Take within 30 mins after a meal.   Quantity:  126 tablet   Refills:  0       * Notice:  This list has 2 medication(s) that are the same as other medications prescribed for you. Read the directions carefully, and ask your doctor or other care provider to review them with you.         Where to get your medicines      These medications were sent to [address] [place] [address] [address] [place] [address]    Hours:  TRANSPLANT PHONE NUMBER [phone] Phone:  [phone]     capecitabine 500 MG tablet CHEMO                Primary Care Provider Office Phone # Fax #    Williamsin Julito Mcgill [phone] [address] [facility] [address]        Equal Access to Services     CHASE DE GUZMAN AH: Tre chino Sopaulo, waaxda luqadaha, qaybta kaalmada marty, re yip. So [facility] [phone].    ATENCIÓN: Si habla español, tiene a quiros disposición servicios gratuitos de asistencia lingüística. Macy al [phone].    We comply with applicable federal civil rights laws and [place] laws. We do not discriminate on the basis of race, color, national origin, age, disability, sex, sexual orientation, or gender identity.            Thank you!     Thank you for choosing [facility]  for your care. Our goal is always to provide you with excellent care. Hearing back from our patients is one way we can continue to improve our services. Please take a few minutes to complete the written survey that you may receive in the mail after your visit with us. Thank you!             Your Updated Medication List - Protect others around you: Learn how to safely use, store and throw away your medicines at www.disposemymeds.org.          This list is accurate as of 6/26/18  9:46 AM.  Always use your most recent med list.                   Brand Name Dispense Instructions for use Diagnosis    acetaminophen 500 MG tablet    TYLENOL    100 tablet    Take 2 tablets (1,000 mg) by mouth every 8 hours    Acute post-operative pain       amylase-lipase-protease 60106-59067 units Cpep per EC capsule    CREON    450 capsule    Take 2-3 with meals / 1-2 with snacks, up to 15 per day.    Necrotizing pancreatitis       blood glucose monitoring test strip    ONETOUCH VERIO IQ    100 each    Use to test blood sugar 4 times daily or as directed.    Type 2 diabetes mellitus with diabetic polyneuropathy, with long-term current use of insulin (H)       * capecitabine 500 MG tablet CHEMO    XELODA    126 tablet    Take 3 tablets (1,500 mg) by mouth 2 times daily for 21 days Days 1 through 21, then 7 days off. Take within 30 mins after a meal.    Pancreatic adenocarcinoma (H)       * capecitabine 500 MG tablet CHEMO    XELODA    126 tablet    Take 3 tablets (1,500 mg) by mouth 2 times daily for 21 days Days 1 through 21, then 7 days off. Take within 30 mins after a meal.    Pancreatic adenocarcinoma (H)       docusate sodium 100 MG capsule    COLACE    60 capsule    Take 1 capsule (100 mg) by mouth daily    Other constipation       insulin  UNIT/ML injection    HumuLIN N/NovoLIN N    3 vial    Inject 10 units twice daily on infusion days, 5 units twice daily on all other days.    Type 2 diabetes mellitus  with diabetic polyneuropathy, with long-term current use of insulin (H)       insulin pen needle 32G X 4 MM    BD DOTTY U/F    100 each    Use 2 daily as directed.    Hyperglycemia, Malignant neoplasm of pancreas, unspecified location of malignancy (H)       insulin syringes (disposable) U-100 0.3 ML Misc     1 each    Inject 10 units twice daily on infusion days, 5 units twice daily all other days    Type 2 diabetes mellitus with diabetic polyneuropathy, with long-term current use of insulin (H)       LORazepam 0.5 MG tablet    ATIVAN    30 tablet    Take 1 tablet (0.5 mg) by mouth every 4 hours as needed (Anxiety, Nausea/Vomiting or Sleep)    Pancreatic adenocarcinoma (H)       ondansetron 8 MG tablet    ZOFRAN    30 tablet    Take 1 tablet (8 mg) by mouth every 8 hours as needed for nausea    Pancreatic adenocarcinoma (H)       ONETOUCH DELICA LANCETS 33G Misc     100 each    4 lancets daily    Type 2 diabetes mellitus with diabetic polyneuropathy, with long-term current use of insulin (H)       pantoprazole 40 MG EC tablet    PROTONIX    90 tablet    Take 1 tablet (40 mg) by mouth every morning    Pancreatic adenocarcinoma (H)       polyethylene glycol Packet    MIRALAX/GLYCOLAX    30 packet    Take 17 g by mouth daily    Drug-induced constipation       prochlorperazine 10 MG tablet    COMPAZINE    30 tablet    Take 1 tablet (10 mg) by mouth every 6 hours as needed (Nausea/Vomiting)    Pancreatic adenocarcinoma (H)       * Notice:  This list has 2 medication(s) that are the same as other medications prescribed for you. Read the directions carefully, and ask your doctor or other care provider to review them with you.

## 2018-06-26 NOTE — PROGRESS NOTES
Infusion Nursing Note:  Kitty Bangura presents today for cycle 2, day 1 Gemzar and Xeloda.    Patient seen by provider today: Yes: DANTE Talavera   present during visit today: Not Applicable.    Note: Patient arrived to infusion center with no new complaints since seeing provider in clinic. Will continue to monitor closely.    Intravenous Access:  Implanted Port.    Treatment Conditions:  Lab Results   Component Value Date    HGB 12.0 06/26/2018     Lab Results   Component Value Date    WBC 7.3 06/26/2018      Lab Results   Component Value Date    ANEU 2.1 06/26/2018     Lab Results   Component Value Date     06/26/2018      Lab Results   Component Value Date     06/26/2018                   Lab Results   Component Value Date    POTASSIUM 4.1 06/26/2018        Lab Results   Component Value Date    CR 0.60 06/26/2018                   Lab Results   Component Value Date    ILIANA 9.0 06/26/2018                Lab Results   Component Value Date    BILITOTAL 0.2 06/26/2018           Lab Results   Component Value Date    ALBUMIN 3.5 06/26/2018                    Lab Results   Component Value Date    ALT 23 06/26/2018           Lab Results   Component Value Date    AST 25 06/26/2018     Results reviewed, labs MET treatment parameters, ok to proceed with treatment.    Post Infusion Assessment:  Patient tolerated infusion without incident.  Blood return noted pre and post infusion.  Site patent and intact, free from redness, edema or discomfort.  No evidence of extravasations.  Access discontinued per protocol.    Discharge Plan:   Prescription refills given for xeloda.  Discharge instructions reviewed with: Patient.  Patient and/or family verbalized understanding of discharge instructions and all questions answered.  Copy of AVS reviewed with patient and/or family.  Patient will return 7/3/2018 for next appointment.  Patient discharged in stable condition accompanied by: .  Departure  Mode: Ambulatory.    Tc Plascencia RN

## 2018-06-26 NOTE — PATIENT INSTRUCTIONS
Contact Numbers    [facility] Main Line: [phone]  [facility] Triage and after hours / weekends / holidays:  [phone]      Please call the triage or after hours line if you experience a temperature greater than or equal to 100.5, shaking chills, have uncontrolled nausea, vomiting and/or diarrhea, dizziness, shortness of breath, chest pain, bleeding, unexplained bruising, or if you have any other new/concerning symptoms, questions or concerns.      If you are having any concerning symptoms or wish to speak to a provider before your next infusion visit, please call your care coordinator or triage to notify them so we can adequately serve you.     If you need a refill on a narcotic prescription or other medication, please call before your infusion appointment.           June 2018 Sunday Monday Tuesday Wednesday Thursday Friday Saturday                            1     2       3     4     5     [facility]    8:00 AM   (15 min.)    [facility]RAW   [facility]     [facility] ONC INFUSION 360    8:30 AM   (360 min.)    ONCOLOGY INFUSION   [facility] NEW DIABETES    8:45 AM   (60 min.)   Es Gallo MD   [facility] Endocrinology 6     7     8     9       10     11     12     [facility] DRAW    8:00 AM   (15 min.)    [facility] DRAW   [facility]     [facility] RETURN    8:25 AM   (50 min.)   Kellie Nobles PA-C   [facility] ONC INFUSION 60    9:30 AM   (60 min.)    ONCOLOGY INFUSION   [facility]     LONG   11:15 AM   (60 min.)   Yeni Mota RN   [facility] Diabetes 13     14     15     16       17     18     19     20     21     22     23       24     25     26     [facility] DRAW    6:30 AM   (15 min.)    [facility] DRAW   [facility]     [facility] RETURN    6:45 AM   (50 min.)   Kellie Nobles PA-C   [facility] ONC INFUSION 60    8:30 AM    (60 min.)   UC ONCOLOGY INFUSION   [facility] 27     28     29     30                July 2018 Sunday Monday Tuesday Wednesday Thursday Friday Saturday   1     2     [facility] DRAW    9:00 AM   (15 min.)    [facility] LAB DRAW   [facility] Lab Draw     [facility] ONC INFUSION 60    9:30 AM   (60 min.)    ONCOLOGY INFUSION   [facility] 3     4     5     6     7       8     9     [facility] DRAW    9:00 AM   (15 min.)    [facility] LAB DRAW   [facility] Lab Draw     [facility] ONC INFUSION 60    9:30 AM   (60 min.)    ONCOLOGY INFUSION   [facility]     [facility] RETURN DIABETES   11:45 AM   (30 min.)   Es Gallo MD   [facility] Endocrinology 10     11     12     13     14       15     16     17     18     19     20     21       22     23     [facility] DRAW    8:15 AM   (15 min.)    [facility] LAB DRAW   [facility] Lab Draw     [facility] RETURN    8:25 AM   (50 min.)   Kellie Nobles PA-C   [facility]     [facility] ONC INFUSION 60    9:30 AM   (60 min.)    ONCOLOGY INFUSION   [facility] 24     25     26     27     28       29     30     [facility] DRAW    8:45 AM   (15 min.)    [facility] LAB DRAW   [facility] Mason[facility] Lab Draw     [facility] ONC INFUSION 60    9:30 AM   (60 min.)    ONCOLOGY INFUSION   [facility] 31                                      Lab Results:  Recent Results (from the past 12 hour(s))   CBC with platelets differential    Collection Time: 06/26/18  6:35 AM   Result Value Ref Range    WBC 7.3 4.0 - 11.0 10e9/L    RBC Count 3.94 3.8 - 5.2 10e12/L    Hemoglobin 12.0 11.7 - 15.7 g/dL    Hematocrit 37.3 35.0 - 47.0 %    MCV 95 78 - 100 fl    MCH 30.5 26.5 - 33.0 pg    MCHC 32.2 31.5 - 36.5 g/dL    RDW 14.9 10.0 - 15.0 %    Platelet Count 687 (H) 150 - 450 10e9/L    Diff Method Automated Method     % Neutrophils 28.8 %    % Lymphocytes 46.8 %    % Monocytes  20.5 %    % Eosinophils 2.7 %    % Basophils 1.1 %    % Immature Granulocytes 0.1 %    Nucleated RBCs 0 0 /100    Absolute Neutrophil 2.1 1.6 - 8.3 10e9/L    Absolute Lymphocytes 3.4 0.8 - 5.3 10e9/L    Absolute Monocytes 1.5 (H) 0.0 - 1.3 10e9/L    Absolute Eosinophils 0.2 0.0 - 0.7 10e9/L    Absolute Basophils 0.1 0.0 - 0.2 10e9/L    Abs Immature Granulocytes 0.0 0 - 0.4 10e9/L    Absolute Nucleated RBC 0.0     Platelet Estimate Confirming automated cell count    Comprehensive metabolic panel    Collection Time: 06/26/18  6:35 AM   Result Value Ref Range    Sodium 142 133 - 144 mmol/L    Potassium 4.1 3.4 - 5.3 mmol/L    Chloride 108 94 - 109 mmol/L    Carbon Dioxide 27 20 - 32 mmol/L    Anion Gap 7 3 - 14 mmol/L    Glucose 92 70 - 99 mg/dL    Urea Nitrogen 14 7 - 30 mg/dL    Creatinine 0.60 0.52 - 1.04 mg/dL    GFR Estimate >90 >60 mL/min/1.7m2    GFR Estimate If Black >90 >60 mL/min/1.7m2    Calcium 9.0 8.5 - 10.1 mg/dL    Bilirubin Total 0.2 0.2 - 1.3 mg/dL    Albumin 3.5 3.4 - 5.0 g/dL    Protein Total 7.1 6.8 - 8.8 g/dL    Alkaline Phosphatase 110 40 - 150 U/L    ALT 23 0 - 50 U/L    AST 25 0 - 45 U/L

## 2018-06-26 NOTE — NURSING NOTE
"Oncology Rooming Note    June 26, 2018 7:07 AM   Kitty Bangura is a 57 year old female who presents for:    Chief Complaint   Patient presents with     Port Draw     port accessed and labs drawn by rn.  vs taken.     Oncology Clinic Visit     return - panc ca      Initial Vitals: /64 (BP Location: Right arm, Patient Position: Sitting, Cuff Size: Adult Regular)  Pulse 69  Temp 97.8  F (36.6  C) (Oral)  Resp 16  Wt 69.4 kg (153 lb 1.6 oz)  SpO2 96%  BMI 25.48 kg/m2 Estimated body mass index is 25.48 kg/(m^2) as calculated from the following:    Height as of 6/12/18: 1.651 m (5' 5\").    Weight as of this encounter: 69.4 kg (153 lb 1.6 oz). Body surface area is 1.78 meters squared.  No Pain (0) Comment: Data Unavailable   No LMP recorded. Patient is postmenopausal.  Allergies reviewed: Yes  Medications reviewed: Yes    Medications: Medication refills not needed today.  Pharmacy name entered into [facility]:    [id number] - [place], [place] - [address] [place] [address] [id number] - [place], [place] - [address] AT [facility] [place] [address] [place] [address]    Clinical concerns: no new concerns     6 minutes for nursing intake (face to face time)     Coleen Briceno CMA            "

## 2018-06-26 NOTE — MR AVS SNAPSHOT
After Visit Summary   6/26/2018    Kitty Bangura    MRN: [id number]           Patient Information     Date Of Birth          1960        Visit Information        Provider Department      6/26/2018 7:00 AM Kellie Nobles PA-C [facility]        Today's Diagnoses     Pancreatic adenocarcinoma (H)    -  1       Follow-ups after your visit        Your next 10 appointments already scheduled     Jun 26, 2018  8:30 AM CDT   Infusion 60 with UC ONCOLOGY INFUSION, UC 16 ATC   [facility] ([facility])    [address]  [address]  [facility] [address]   [phone]            Jul 02, 2018  9:00 AM CDT   Masonic Lab Draw with UC MASONIC LAB DRAW   [facility] ([facility])    [address] [place] [address]  [address]  [facility] [address]   [phone]            Jul 02, 2018  9:30 AM CDT   Infusion 60 with UC ONCOLOGY INFUSION, UC 21 ATC   [facility] ([facility])    [address] [place] [address]  [address]  [facility] [address]   [phone]            Jul 09, 2018  9:00 AM CDT   Masonic Lab Draw with UC MASONIC LAB DRAW   [facility] Masonic Lab Draw ([facility])    [address] [place] [address]  [address]  [facility] [address]   [phone]            Jul 09, 2018  9:30 AM CDT   Infusion 60 with UC ONCOLOGY INFUSION, UC 22 ATC   [facility] ([facility])    [address] [place] [address]  [address]  [facility] [address]   [phone]            Jul 09, 2018 12:00 PM CDT   (Arrive by 11:45 AM)   RETURN DIABETES with Es Gallo MD   [facility] End[facility] ([facility])    [address] [place] [address]  3rd Floor  [facility] [address]   [phone]            Jul 23, 2018  8:15 AM CDT   Masonic Lab Draw with UC MASONIC LAB DRAW   [facility] Masonic Lab Draw (  [facility])    [address]  [address]  [facility] [address] [phone]              Future tests that were ordered for you today     Open Future Orders        Priority Expected Expires Ordered    CT Chest/Abdomen/Pelvis w Contrast Routine 9/17/2018 6/26/2019 6/26/2018    Cancer antigen 19-9 Routine 9/17/2018 6/26/2019 6/26/2018            Who to contact     If you have questions or need follow up information about today's clinic visit or your schedule please contact [facility] directly at [phone].  Normal or non-critical lab and imaging results will be communicated to you by Magixhart, letter or phone within 4 business days after the clinic has received the results. If you do not hear from us within 7 days, please contact the clinic through Mobilizt or phone. If you have a critical or abnormal lab result, we will notify you by phone as soon as possible.  Submit refill requests through Fancy Hands or call your pharmacy and they will forward the refill request to us. Please allow 3 business days for your refill to be completed.          Additional Information About Your Visit        Magixhart Information     Fancy Hands gives you secure access to your electronic health record. If you see a primary care provider, you can also send messages to your care team and make appointments. If you have questions, please call your primary care clinic.  If you do not have a primary care provider, please call [phone] and they will assist you.        Care EveryWhere ID     This is your Care EveryWhere ID. This could be used by other organizations to access your [place] medical records  [id number]        Your Vitals Were     Pulse Temperature Respirations Pulse Oximetry BMI (Body Mass Index)       69 97.8  F (36.6  C) (Oral) 16 96% 25.48 kg/m2        Blood Pressure from Last 3 Encounters:   06/26/18 100/64   06/12/18 106/65   06/05/18 104/73    Weight from Last 3 Encounters:    06/26/18 69.4 kg (153 lb 1.6 oz)   06/12/18 67.1 kg (147 lb 14.4 oz)   06/05/18 67.7 kg (149 lb 4 oz)               Primary Care Provider Office Phone # Fax #    Williamsin Julito Mcgill [phone] [address] [address]        Equal Access to Services     CHASE DE GUZMAN : Hadii aad ku hadasho Soomaali, waaxda luqadaha, qaybta kaalmada adeegyada, waxay juniorin hayisaiasn adeeg verenicefrancoisenicolás gerardo . So [facility] [phone].    ATENCIÓN: Si odessa vasquez, tiene a quiros disposición servicios gratuitos de asistencia lingüística. [facility] [phone].    We comply with applicable federal civil rights laws and [place] laws. We do not discriminate on the basis of race, color, national origin, age, disability, sex, sexual orientation, or gender identity.            Thank you!     Thank you for choosing [facility]  for your care. Our goal is always to provide you with excellent care. Hearing back from our patients is one way we can continue to improve our services. Please take a few minutes to complete the written survey that you may receive in the mail after your visit with us. Thank you!             Your Updated Medication List - Protect others around you: Learn how to safely use, store and throw away your medicines at www.disposemymeds.org.          This list is accurate as of 6/26/18  7:48 AM.  Always use your most recent med list.                   Brand Name Dispense Instructions for use Diagnosis    acetaminophen 500 MG tablet    TYLENOL    100 tablet    Take 2 tablets (1,000 mg) by mouth every 8 hours    Acute post-operative pain       amylase-lipase-protease 61447-07619 units Cpep per EC capsule    CREON    450 capsule    Take 2-3 with meals / 1-2 with snacks, up to 15 per day.    Necrotizing pancreatitis       blood glucose monitoring test strip    ONETOUCH VERIO IQ    100 each    Use to test blood sugar 4 times daily or as directed.    Type 2 diabetes mellitus with  diabetic polyneuropathy, with long-term current use of insulin (H)       capecitabine 500 MG tablet CHEMO    XELODA    126 tablet    Take 3 tablets (1,500 mg) by mouth 2 times daily for 21 days Days 1 through 21, then 7 days off. Take within 30 mins after a meal.    Pancreatic adenocarcinoma (H)       docusate sodium 100 MG capsule    COLACE    60 capsule    Take 1 capsule (100 mg) by mouth daily    Other constipation       insulin  UNIT/ML injection    HumuLIN N/NovoLIN N    3 vial    Inject 10 units twice daily on infusion days, 5 units twice daily on all other days.    Type 2 diabetes mellitus with diabetic polyneuropathy, with long-term current use of insulin (H)       insulin pen needle 32G X 4 MM    BD DOTTY U/F    100 each    Use 2 daily as directed.    Hyperglycemia, Malignant neoplasm of pancreas, unspecified location of malignancy (H)       insulin syringes (disposable) U-100 0.3 ML Misc     1 each    Inject 10 units twice daily on infusion days, 5 units twice daily all other days    Type 2 diabetes mellitus with diabetic polyneuropathy, with long-term current use of insulin (H)       LORazepam 0.5 MG tablet    ATIVAN    30 tablet    Take 1 tablet (0.5 mg) by mouth every 4 hours as needed (Anxiety, Nausea/Vomiting or Sleep)    Pancreatic adenocarcinoma (H)       ondansetron 8 MG tablet    ZOFRAN    30 tablet    Take 1 tablet (8 mg) by mouth every 8 hours as needed for nausea    Pancreatic adenocarcinoma (H)       ONETOUCH DELICA LANCETS 33G Misc     100 each    4 lancets daily    Type 2 diabetes mellitus with diabetic polyneuropathy, with long-term current use of insulin (H)       pantoprazole 40 MG EC tablet    PROTONIX    90 tablet    Take 1 tablet (40 mg) by mouth every morning    Pancreatic adenocarcinoma (H)       polyethylene glycol Packet    MIRALAX/GLYCOLAX    30 packet    Take 17 g by mouth daily    Drug-induced constipation       prochlorperazine 10 MG tablet    COMPAZINE    30 tablet     Take 1 tablet (10 mg) by mouth every 6 hours as needed (Nausea/Vomiting)    Pancreatic adenocarcinoma (H)

## 2018-06-26 NOTE — LETTER
6/26/2018      RE: Kitty Bangura  [address] [address]       Oncology/Hematology Visit Note  Jun 26, 2018    Reason for Visit: Follow up of resectable pancreatic cancer    History of Present Illness: Staging CT chest/abd/pelvis on 12/9/17 showed several small pulmonary nodules (largest 3mm), unchanged mass in the pancreatic tail, mildly prominent mesenteric nodes thought likely reactive, and small right hepatic lobe hypodensities. Abdominal MRI on 12/10/17 showed hepatic hemangiomas, no evidence of metastatic disease to the liver.   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     Kitty met with Dr Monk and discussed neoadjuvant chemotherapy with FOLFIRINOX, then surgery and then adjuvant chemotherapy. She was seen on 1/15/18 for cycle 1 and then hospitalized for pancreatitis on 1/20/18 where she was managed with IV fluds and pain control. GI took her to the OR to attempt pancreatic duct stent placement but unfortunately it was completely obstructed. They were able to place a pancreatic stent and perform an EUS cyst-gastrostomy with plans to repeat Xray a month following to ensure stent passage. She was d/c 1/27. Cycle 2 was given 2/5/18 (a week delayed due to hospitalization).     She went to ED on 2/11 with worsening abdominal pain and was worried about pancreatitis. W/u with CT and labs including lipase were negative. She had elevated WBC of 33K but had Neulasta on 2/8.     Cycle 3 was given on 2/20 with dose-reduction (10%) of oxaliplatin due to significant cold sensitivity and motor neuropathy. Complicated by bronchitis.    Cycle 4 was given 3/8/18. She went on to have distal pancreatotomy and splenectomy and cholecystectomy on 4/17/18. Final pathology showed complete pathologic response, 26 benign lymph nodes, and necrotizing pancreatitis.     She started adjuvant chemotherapy with Xeloda  and gemcitabine 5/30/18. She had baseline CT on 5/22/18 showing no evidence of recurrent disease.     Interval History:  Mrs. Bangura returns to clinic today with her  prior to cycle 2 day 1. She is feeling well today. Her mucositis recovered during the past 2 weeks. She denies any nausea. She has 1-2 bowel movements per day that are soft. She has mild rectal irritation but overall this is better. Denies any neuropathy or HFS. Post-prandial abdominal pain has resolved. She has occasional LUQ pain below costal margin intermittently, sometimes when she walks. Does not feel SOB or pleuritic. BS have been better. No recent fevers/chills or infectious concerns. She was able to golf 9 holes.     Review of Systems:  Patient denies fevers, chills, night sweats, unexplained weight changes, headaches, dizziness, vision or hearing changes, new lumps or bumps, chest pain, shortness of breath, cough, abdominal pain, nausea, vomiting, changes to bowel or bladder, swelling of extremities, bleeding issues, or rash.    Current Outpatient Prescriptions   Medication Sig Dispense Refill     acetaminophen (TYLENOL) 500 MG tablet Take 2 tablets (1,000 mg) by mouth every 8 hours (Patient not taking: Reported on 6/5/2018) 100 tablet 1     amylase-lipase-protease (CREON) [address]8980-30[address] UNITS CPEP per EC capsule Take 2-3 with meals / 1-2 with snacks, up to 15 per day. 450 capsule 6     blood glucose monitoring (ONETOUCH VERIO IQ) test strip Use to test blood sugar 4 times daily or as directed. 100 each 3     capecitabine (XELODA) 500 MG tablet CHEMO Take 3 tablets (1,500 mg) by mouth 2 times daily for 21 days Days 1 through 21, then 7 days off. Take within 30 mins after a meal. 126 tablet 0     docusate sodium (COLACE) 100 MG capsule Take 1 capsule (100 mg) by mouth daily 60 capsule 1     insulin NPH (HUMULIN N/NOVOLIN N) 100 UNIT/ML injection Inject 10 units twice daily on infusion days, 5 units twice daily on all other days. 3 vial 3      insulin pen needle (BD DOTTY U/F) 32G X 4 MM Use 2 daily as directed. 100 each 11     insulin syringes, disposable, U-100 0.3 ML MISC Inject 10 units twice daily on infusion days, 5 units twice daily all other days 1 each 3     LORazepam (ATIVAN) 0.5 MG tablet Take 1 tablet (0.5 mg) by mouth every 4 hours as needed (Anxiety, Nausea/Vomiting or Sleep) (Patient not taking: Reported on 6/5/2018) 30 tablet 2     ondansetron (ZOFRAN) 8 MG tablet Take 1 tablet (8 mg) by mouth every 8 hours as needed for nausea (Patient not taking: Reported on 6/26/2018) 30 tablet 0     ONETOUCH DELICA LANCETS 33G MISC 4 lancets daily 100 each 3     pantoprazole (PROTONIX) 40 MG EC tablet Take 1 tablet (40 mg) by mouth every morning 90 tablet 1     polyethylene glycol (MIRALAX/GLYCOLAX) Packet Take 17 g by mouth daily (Patient not taking: Reported on 6/5/2018) 30 packet 0     prochlorperazine (COMPAZINE) 10 MG tablet Take 1 tablet (10 mg) by mouth every 6 hours as needed (Nausea/Vomiting) (Patient not taking: Reported on 6/5/2018) 30 tablet 2       Physical Examination:  /64 (BP Location: Right arm, Patient Position: Sitting, Cuff Size: Adult Regular)  Pulse 69  Temp 97.8  F (36.6  C) (Oral)  Resp 16  Wt 69.4 kg (153 lb 1.6 oz)  SpO2 96%  BMI 25.48 kg/m2  Wt Readings from Last 10 Encounters:   06/26/18 69.4 kg (153 lb 1.6 oz)   06/12/18 67.1 kg (147 lb 14.4 oz)   06/05/18 67.7 kg (149 lb 4 oz)   06/05/18 67.7 kg (149 lb 3.2 oz)   05/30/18 68.9 kg (152 lb)   05/14/18 68.7 kg (151 lb 8 oz)   05/14/18 68.7 kg (151 lb 8 oz)   05/08/18 68 kg (150 lb)   05/04/18 69.4 kg (153 lb)   04/30/18 69.9 kg (154 lb)     Constitutional: Well-appearing female in no acute distress.  Eyes: EOMI, PERRL. No scleral icterus.  ENT: Oral mucosa is moist without thrush or mucositis.  Lymphatic: Neck is supple without cervical or supraclavicular lymphadenopathy.   Cardiovascular: Regular rate and rhythm. No murmurs, gallops, or rubs. No peripheral  edema.  Respiratory: Clear to auscultation bilaterally. No wheezes or crackles.   Gastrointestinal: Bowel sounds present. Abdomen soft, nontender, nondistended. No palpable hepatosplenomegaly or masses.   Neurologic: Cranial nerves II through XII are grossly intact.  Skin: No rashes, petechiae, or bruising noted on exposed skin.    Laboratory Data:   6/26/2018 06:35   Sodium 142   Potassium 4.1   Chloride 108   Carbon Dioxide 27   Urea Nitrogen 14   Creatinine 0.60   GFR Estimate >90   GFR Estimate If Black >90   Calcium 9.0   Anion Gap 7   Albumin 3.5   Protein Total 7.1   Bilirubin Total 0.2   Alkaline Phosphatase 110   ALT 23   AST 25   Glucose 92   WBC 7.3   Hemoglobin 12.0   Hematocrit 37.3   Platelet Count 687 (H)   RBC Count 3.94   MCV 95   MCH 30.5   MCHC 32.2   RDW 14.9   Diff Method Automated Method   % Neutrophils 28.8   % Lymphocytes 46.8   % Monocytes 20.5   % Eosinophils 2.7   % Basophils 1.1   % Immature Granulocytes 0.1   Nucleated RBCs 0   Absolute Neutrophil 2.1   Absolute Lymphocytes 3.4   Absolute Monocytes 1.5 (H)   Absolute Eosinophils 0.2   Absolute Basophils 0.1   Abs Immature Granulocytes 0.0   Absolute Nucleated RBC 0.0   Platelet Estimate Confirming automa...       Assessment and Plan:  1. Pancreatic cancer  S/p 4 cycles of neoadjuvant  FOLFIRINOX with good response so she was able to undergo distal pancreatectomy and splenectomy. Her pathology showed complete pathologic response. She started adjuvant gem/xeloda 1 month ago and is tolerating well with mild symptoms. Feels well today to proceed with cycle 2. Plan for 4 cycles followed by surveillance. She will call with any interval concerns.     2. DM: Secondary to prediabetes, family history, and now distal pancreatectomy. She saw endocrinology and was started on NPH with dosing for steroids. Will decrease dex to 8 mg given mild nausea.     3. Rectal irritation: Continue colace 1-2 tabs daily.    4. Epigastric pain with indigestion:  Resumed pantoprazole 40 mg daily with improvement. Discussed using prn TUMs.     Kellie Nobles PA-C  [facility]  [address] [place] [address] [phone]

## 2018-06-29 PROBLEM — C25.9 PANCREAS CANCER (H): Status: RESOLVED | Noted: 2018-04-04 | Resolved: 2018-06-29

## 2018-06-29 PROBLEM — C25.9 PANCREATIC CANCER (H): Status: RESOLVED | Noted: 2018-04-17 | Resolved: 2018-06-29

## 2018-07-02 ENCOUNTER — APPOINTMENT (OUTPATIENT)
Dept: LAB | Facility: CLINIC | Age: 58
End: 2018-07-02
Attending: INTERNAL MEDICINE
Payer: COMMERCIAL

## 2018-07-02 ENCOUNTER — TELEPHONE (OUTPATIENT)
Dept: ONCOLOGY | Facility: CLINIC | Age: 58
End: 2018-07-02

## 2018-07-02 ENCOUNTER — INFUSION THERAPY VISIT (OUTPATIENT)
Dept: ONCOLOGY | Facility: CLINIC | Age: 58
End: 2018-07-02
Attending: INTERNAL MEDICINE
Payer: COMMERCIAL

## 2018-07-02 VITALS
SYSTOLIC BLOOD PRESSURE: 95 MMHG | OXYGEN SATURATION: 99 % | DIASTOLIC BLOOD PRESSURE: 65 MMHG | BODY MASS INDEX: 25.26 KG/M2 | RESPIRATION RATE: 16 BRPM | HEART RATE: 72 BPM | WEIGHT: 151.8 LBS | TEMPERATURE: 97.9 F

## 2018-07-02 DIAGNOSIS — C25.9 PANCREATIC ADENOCARCINOMA (H): Primary | ICD-10-CM

## 2018-07-02 DIAGNOSIS — Z79.899 ENCOUNTER FOR LONG-TERM (CURRENT) USE OF MEDICATIONS: ICD-10-CM

## 2018-07-02 LAB
ALBUMIN SERPL-MCNC: 3.6 G/DL (ref 3.4–5)
ALP SERPL-CCNC: 123 U/L (ref 40–150)
ALT SERPL W P-5'-P-CCNC: 24 U/L (ref 0–50)
ANION GAP SERPL CALCULATED.3IONS-SCNC: 8 MMOL/L (ref 3–14)
AST SERPL W P-5'-P-CCNC: 23 U/L (ref 0–45)
BASOPHILS # BLD AUTO: 0.1 10E9/L (ref 0–0.2)
BASOPHILS NFR BLD AUTO: 1.4 %
BILIRUB SERPL-MCNC: 0.3 MG/DL (ref 0.2–1.3)
BUN SERPL-MCNC: 16 MG/DL (ref 7–30)
CALCIUM SERPL-MCNC: 9.2 MG/DL (ref 8.5–10.1)
CHLORIDE SERPL-SCNC: 105 MMOL/L (ref 94–109)
CO2 SERPL-SCNC: 25 MMOL/L (ref 20–32)
CREAT SERPL-MCNC: 0.62 MG/DL (ref 0.52–1.04)
DIFFERENTIAL METHOD BLD: ABNORMAL
EOSINOPHIL # BLD AUTO: 0.1 10E9/L (ref 0–0.7)
EOSINOPHIL NFR BLD AUTO: 2.3 %
ERYTHROCYTE [DISTWIDTH] IN BLOOD BY AUTOMATED COUNT: 14.6 % (ref 10–15)
GFR SERPL CREATININE-BSD FRML MDRD: >90 ML/MIN/1.7M2
GLUCOSE SERPL-MCNC: 214 MG/DL (ref 70–99)
HCT VFR BLD AUTO: 38.9 % (ref 35–47)
HGB BLD-MCNC: 12.7 G/DL (ref 11.7–15.7)
IMM GRANULOCYTES # BLD: 0 10E9/L (ref 0–0.4)
IMM GRANULOCYTES NFR BLD: 0.2 %
LYMPHOCYTES # BLD AUTO: 2.4 10E9/L (ref 0.8–5.3)
LYMPHOCYTES NFR BLD AUTO: 42.4 %
MCH RBC QN AUTO: 30.2 PG (ref 26.5–33)
MCHC RBC AUTO-ENTMCNC: 32.6 G/DL (ref 31.5–36.5)
MCV RBC AUTO: 93 FL (ref 78–100)
MONOCYTES # BLD AUTO: 0.3 10E9/L (ref 0–1.3)
MONOCYTES NFR BLD AUTO: 5.2 %
NEUTROPHILS # BLD AUTO: 2.8 10E9/L (ref 1.6–8.3)
NEUTROPHILS NFR BLD AUTO: 48.5 %
NRBC # BLD AUTO: 0.1 10*3/UL
NRBC BLD AUTO-RTO: 2 /100
PLATELET # BLD AUTO: 440 10E9/L (ref 150–450)
POTASSIUM SERPL-SCNC: 4.5 MMOL/L (ref 3.4–5.3)
PROT SERPL-MCNC: 7.6 G/DL (ref 6.8–8.8)
RBC # BLD AUTO: 4.2 10E12/L (ref 3.8–5.2)
SODIUM SERPL-SCNC: 138 MMOL/L (ref 133–144)
WBC # BLD AUTO: 5.8 10E9/L (ref 4–11)

## 2018-07-02 PROCEDURE — 25000128 H RX IP 250 OP 636: Mod: ZF | Performed by: INTERNAL MEDICINE

## 2018-07-02 PROCEDURE — 96375 TX/PRO/DX INJ NEW DRUG ADDON: CPT

## 2018-07-02 PROCEDURE — 25000128 H RX IP 250 OP 636: Mod: ZF | Performed by: PHYSICIAN ASSISTANT

## 2018-07-02 PROCEDURE — 80053 COMPREHEN METABOLIC PANEL: CPT | Performed by: INTERNAL MEDICINE

## 2018-07-02 PROCEDURE — G0463 HOSPITAL OUTPT CLINIC VISIT: HCPCS | Mod: 25

## 2018-07-02 PROCEDURE — 85025 COMPLETE CBC W/AUTO DIFF WBC: CPT | Performed by: PHYSICIAN ASSISTANT

## 2018-07-02 PROCEDURE — 96413 CHEMO IV INFUSION 1 HR: CPT

## 2018-07-02 RX ORDER — HEPARIN SODIUM (PORCINE) LOCK FLUSH IV SOLN 100 UNIT/ML 100 UNIT/ML
5 SOLUTION INTRAVENOUS EVERY 8 HOURS
Status: DISCONTINUED | OUTPATIENT
Start: 2018-07-02 | End: 2018-07-02 | Stop reason: HOSPADM

## 2018-07-02 RX ORDER — HEPARIN SODIUM (PORCINE) LOCK FLUSH IV SOLN 100 UNIT/ML 100 UNIT/ML
5 SOLUTION INTRAVENOUS ONCE
Status: DISCONTINUED | OUTPATIENT
Start: 2018-07-02 | End: 2018-07-02 | Stop reason: HOSPADM

## 2018-07-02 RX ADMIN — SODIUM CHLORIDE, PRESERVATIVE FREE 5 ML: 5 INJECTION INTRAVENOUS at 11:09

## 2018-07-02 RX ADMIN — DEXAMETHASONE SODIUM PHOSPHATE 8 MG: 10 INJECTION, SOLUTION INTRAMUSCULAR; INTRAVENOUS at 10:10

## 2018-07-02 RX ADMIN — SODIUM CHLORIDE 250 ML: 9 INJECTION, SOLUTION INTRAVENOUS at 10:10

## 2018-07-02 RX ADMIN — SODIUM CHLORIDE, PRESERVATIVE FREE 5 ML: 5 INJECTION INTRAVENOUS at 09:23

## 2018-07-02 RX ADMIN — GEMCITABINE 1800 MG: 38 INJECTION INTRAVENOUS at 10:38

## 2018-07-02 ASSESSMENT — PAIN SCALES - GENERAL: PAINLEVEL: SEVERE PAIN (6)

## 2018-07-02 NOTE — ORAL ONC MGMT
"Oral Chemotherapy Monitoring Program    Primary Oncologist: Dr. Monk  Primary Oncology Clinic: [facility]   Cancer Diagnosis: Pancreatic Cancer     Therapy History:  Xeloda 1500 mg BID 30 minutes after a meal    Drug Interaction Assessment: No new drug interactions at this time.     Lab Monitoring Plan  Weekly CBC & CMP  Subjective/Objective:  Kitty Bangura is a 57 year old female seen in clinic for a follow-up visit for oral chemotherapy.  Kitty is tolerating her Xeloda therapy with minimal side effects. She mentioned that she has mild nausea that she is able to control with the use of Zofran and Ativan. She also has a hard time swallowing food on occassion. She does have a history of indigestion and GERD, she takes Protonix to control these symptoms. She takes her chemotherapy every morning at 0900 and every night at 2100. We discussed taking her nighttime dose earlier (around 1930) to make sure that she is taking after a full meal and this may decrease her side effects. She is very diligent about applying lotion to her hands and feet 2 to 3 times daily to prevent HFS.     ORAL CHEMOTHERAPY 5/16/2018 7/2/2018   Drug Name Xeloda (Capecitabine) Xeloda (Capecitabine)   Current Dosage 1500mg 1500mg   Current Schedule BID BID   Cycle Details 3 weeks on 1 week off 3 weeks on 1 week off   Start Date of Last Cycle - 6/26/2018   Planned next cycle start date 5/30/2018 -   Doses missed in last 2 weeks - 0   Adherence Assessment - Adherent   Adverse Effects - Nausea   Nausea - Grade 1   Pharmacist Intervention(nausea) - No   Any new drug interactions? - No   Is the dose as ordered appropriate for the patient? - Yes       Vitals:  BP:   BP Readings from Last 1 Encounters:   07/02/18 95/65     Wt Readings from Last 1 Encounters:   07/02/18 68.9 kg (151 lb 12.8 oz)     Estimated body surface area is 1.78 meters squared as calculated from the following:    Height as of 6/12/18: 1.651 m (5' 5\").    Weight as of an " earlier encounter on 7/2/18: 68.9 kg (151 lb 12.8 oz).    Labs:  _  Result Component Current Result Ref Range   Sodium 138 (7/2/2018) 133 - 144 mmol/L     _  Result Component Current Result Ref Range   Potassium 4.5 (7/2/2018) 3.4 - 5.3 mmol/L     _  Result Component Current Result Ref Range   Calcium 9.2 (7/2/2018) 8.5 - 10.1 mg/dL     No results found for Mag within last 30 days.     No results found for Phos within last 30 days.     _  Result Component Current Result Ref Range   Albumin 3.6 (7/2/2018) 3.4 - 5.0 g/dL     _  Result Component Current Result Ref Range   Urea Nitrogen 16 (7/2/2018) 7 - 30 mg/dL     _  Result Component Current Result Ref Range   Creatinine 0.62 (7/2/2018) 0.52 - 1.04 mg/dL       _  Result Component Current Result Ref Range   AST 23 (7/2/2018) 0 - 45 U/L     _  Result Component Current Result Ref Range   ALT 24 (7/2/2018) 0 - 50 U/L     _  Result Component Current Result Ref Range   Bilirubin Total 0.3 (7/2/2018) 0.2 - 1.3 mg/dL       _  Result Component Current Result Ref Range   WBC 5.8 (7/2/2018) 4.0 - 11.0 10e9/L     _  Result Component Current Result Ref Range   Hemoglobin 12.7 (7/2/2018) 11.7 - 15.7 g/dL     _  Result Component Current Result Ref Range   Platelet Count 440 (7/2/2018) 150 - 450 10e9/L     _  Result Component Current Result Ref Range   Absolute Neutrophil 2.8 (7/2/2018) 1.6 - 8.3 10e9/L     Assessment:  Kitty if tolerating her Xeloda therapy at this time.     Plan:  Continue Xeloda as directed and contact us with any questions or concerns.      Follow-Up:  Appointment with Kellie 7/23 and continue weekly labs    Refill Due:  Start of next cycle 07/24/2018    Meena Olivier  Student Pharmacist  Oral Chemotherapy Monitoring Program  [facility]  [phone]

## 2018-07-02 NOTE — MR AVS SNAPSHOT
After Visit Summary   7/2/2018    Kitty Bangura    MRN: [id number]           Patient Information     Date Of Birth          1960        Visit Information        Provider Department      7/2/2018 9:30 AM  21 ATC;  ONCOLOGY INFUSION [facility]        Today's Diagnoses     Pancreatic adenocarcinoma (H)    -  1    Encounter for long-term (current) use of medications          Care Instructions    Contact Numbers    [facility] Main Line: [phone]  [facility] Triage and after hours / weekends / holidays:  [phone]      Please call the triage or after hours line if you experience a temperature greater than or equal to 100.5, shaking chills, have uncontrolled nausea, vomiting and/or diarrhea, dizziness, shortness of breath, chest pain, bleeding, unexplained bruising, or if you have any other new/concerning symptoms, questions or concerns.      If you are having any concerning symptoms or wish to speak to a provider before your next infusion visit, please call your care coordinator or triage to notify them so we can adequately serve you.     If you need a refill on a narcotic prescription or other medication, please call before your infusion appointment.             July 2018 Sunday Monday Tuesday Wednesday Thursday Friday Saturday   1     2     [facility] LAB DRAW    9:00 AM   (15 min.)   [facility] DRAW   [facility] Lab Draw     [facility] ONC INFUSION 60    9:30 AM   (60 min.)    ONCOLOGY INFUSION   [facility] 3     4     5     6     7       8     9     [facility]    9:00 AM   (15 min.)    [facility] LAB DRAW   [facility] Lab Draw     [facility] ONC INFUSION 60    9:30 AM   (60 min.)    ONCOLOGY INFUSION   [facility]     [facility] RETURN DIABETES   11:45 AM   (30 min.)   Es Gallo MD   [facility] Endocrinology 10     11     12     13     14       15     16     17     18     19     20     21       22     23     [facility]  [facility] LAB DRAW    8:15 AM   (15 min.)    [facility] LAB DRAW   [facility] Lab Draw     [facility] RETURN    8:25 AM   (50 min.)   Kellie Nobles PA-C   [facility]     UMP ONC INFUSION 60    9:30 AM   (60 min.)    ONCOLOGY INFUSION   [facility] 24     25     26     27     28       29     30     [facility] DRAW    8:45 AM   (15 min.)    [facility] LAB DRAW   [facility] Lab Draw     UMP ONC INFUSION 60    9:30 AM   (60 min.)    ONCOLOGY INFUSION   [facility] 31 August 2018 Sunday Monday Tuesday Wednesday Thursday Friday Saturday                  1     2     3     4       5     6     [facility] DRAW    8:30 AM   (15 min.)    [facility] LAB DRAW   [facility] Lab Draw     UMP ONC INFUSION 60    9:00 AM   (60 min.)    ONCOLOGY INFUSION   [facility] 7     8     9     10     11       12     13     14     15     16     17     18       19     20     21     22     [facility] DRAW    7:15 AM   (15 min.)    [facility] LAB DRAW   [facility] Lab Draw     UMP RETURN    7:35 AM   (50 min.)   Kellie Nobles PA-C   [facility]     UMP ONC INFUSION 60    9:00 AM   (60 min.)    ONCOLOGY INFUSION   [facility] 23     24     25       26     27     28     [facility] DRAW    9:00 AM   (15 min.)    [facility] LAB DRAW   [facility] Lab Draw     [facility] ONC INFUSION 60    9:30 AM   (60 min.)    ONCOLOGY INFUSION   [facility] 29     30     31                      Recent Results (from the past 24 hour(s))   CBC with platelets differential    Collection Time: 07/02/18  9:24 AM   Result Value Ref Range    WBC 5.8 4.0 - 11.0 10e9/L    RBC Count 4.20 3.8 - 5.2 10e12/L    Hemoglobin 12.7 11.7 - 15.7 g/dL    Hematocrit 38.9 35.0 - 47.0 %    MCV 93 78 - 100 fl    MCH 30.2 26.5 - 33.0 pg    MCHC 32.6 31.5 - 36.5 g/dL     RDW 14.6 10.0 - 15.0 %    Platelet Count 440 150 - 450 10e9/L    Diff Method Automated Method     % Neutrophils 48.5 %    % Lymphocytes 42.4 %    % Monocytes 5.2 %    % Eosinophils 2.3 %    % Basophils 1.4 %    % Immature Granulocytes 0.2 %    Nucleated RBCs 2 (H) 0 /100    Absolute Neutrophil 2.8 1.6 - 8.3 10e9/L    Absolute Lymphocytes 2.4 0.8 - 5.3 10e9/L    Absolute Monocytes 0.3 0.0 - 1.3 10e9/L    Absolute Eosinophils 0.1 0.0 - 0.7 10e9/L    Absolute Basophils 0.1 0.0 - 0.2 10e9/L    Abs Immature Granulocytes 0.0 0 - 0.4 10e9/L    Absolute Nucleated RBC 0.1    Comprehensive metabolic panel    Collection Time: 07/02/18  9:24 AM   Result Value Ref Range    Sodium 138 133 - 144 mmol/L    Potassium 4.5 3.4 - 5.3 mmol/L    Chloride 105 94 - 109 mmol/L    Carbon Dioxide 25 20 - 32 mmol/L    Anion Gap 8 3 - 14 mmol/L    Glucose 214 (H) 70 - 99 mg/dL    Urea Nitrogen 16 7 - 30 mg/dL    Creatinine 0.62 0.52 - 1.04 mg/dL    GFR Estimate >90 >60 mL/min/1.7m2    GFR Estimate If Black >90 >60 mL/min/1.7m2    Calcium 9.2 8.5 - 10.1 mg/dL    Bilirubin Total 0.3 0.2 - 1.3 mg/dL    Albumin 3.6 3.4 - 5.0 g/dL    Protein Total 7.6 6.8 - 8.8 g/dL    Alkaline Phosphatase 123 40 - 150 U/L    ALT 24 0 - 50 U/L    AST 23 0 - 45 U/L                 Follow-ups after your visit        Your next 10 appointments already scheduled     Jul 09, 2018  9:00 AM CDT   [facility] Lab Draw with [facility] DRAW   [facility] Lab Draw ([facility])    [address] [place] [address]  [address]  [facility] [address] [phone]            Jul 09, 2018  9:30 AM CDT   Infusion 60 with  ONCOLOGY INFUSION, UC 22 ATC   [facility] ([facility])    [address] [place] [address]  [address] [place] [address]   [phone]            Jul 09, 2018 12:00 PM CDT   (Arrive by 11:45 AM)   RETURN DIABETES with Es Gallo MD   [facility] Endocrinology [facility]  [place])    [address]  3rd Floor  [facility] [address]   [phone]            Jul 23, 2018  8:15 AM CDT   [facility] Lab Draw with [facility] DRAW   [facility] Lab Draw ([facility])    [address]  [address]  [facility] [address]   [phone]            Jul 23, 2018  8:40 AM CDT   (Arrive by 8:25 AM)   Return Visit with Kellie Nobles PA-C   [facility] ([facility])    [address] [place] [address]  [address]  [facility] [address]   [phone]            Jul 23, 2018  9:30 AM CDT   Infusion 60 with UC ONCOLOGY INFUSION, UC 22 ATC   [facility] ([facility])    [address] [place] [address]  [address]  [facility] [address]   [phone]            Jul 30, 2018  8:45 AM CDT   [facility] Lab Draw with [facility] DRAW   [facility])    [address] [place] [address]  [address]  [facility] [address]   [phone]            Jul 30, 2018  9:30 AM CDT   Infusion 60 with UC ONCOLOGY INFUSION   [facility] ([facility])    [address] [place] [address]  [address]  [facility] [address]   [phone]              Who to contact     If you have questions or need follow up information about today's clinic visit or your schedule please contact [facility] directly at [phone].  Normal or non-critical lab and imaging results will be communicated to you by MyChart, letter or phone within 4 business days after the clinic has received the results. If you do not hear from us within 7 days, please contact the clinic through MyChart or phone. If you have a critical or abnormal lab result, we will notify you by phone as soon as possible.  Submit refill requests through [employer] or call your pharmacy and they will forward the refill request to us. Please allow 3 business days  for your refill to be completed.          Additional Information About Your Visit        MyChart Information     [employer] gives you secure access to your electronic health record. If you see a primary care provider, you can also send messages to your care team and make appointments. If you have questions, please call your primary care clinic.  If you do not have a primary care provider, please call [phone] and they will assist you.        Care EveryWhere ID     This is your Care EveryWhere ID. This could be used by other organizations to access your [facility] medical records  [id number]        Your Vitals Were     Pulse Temperature Respirations Pulse Oximetry BMI (Body Mass Index)       72 97.9  F (36.6  C) 16 99% 25.26 kg/m2        Blood Pressure from Last 3 Encounters:   07/02/18 95/65   06/26/18 100/64   06/12/18 106/65    Weight from Last 3 Encounters:   07/02/18 68.9 kg (151 lb 12.8 oz)   06/26/18 69.4 kg (153 lb 1.6 oz)   06/12/18 67.1 kg (147 lb 14.4 oz)              We Performed the Following     CBC with platelets differential     Comprehensive metabolic panel        Primary Care Provider Office Phone # Fax #    Solange Mcgill [phone] [address] [address]        Equal Access to Services     CHASE DE GUZMAN : Hadii zaheer ku hadasho Sopaulo, waaxda luqadaha, qaybta kaalmada adeegyada, re gerardo . So [facility] [phone].    ATENCIÓN: Si habla español, tiene a quiros disposición servicios gratuitos de asistencia lingüística. Llame al [phone].    We comply with applicable federal civil rights laws and [place] laws. We do not discriminate on the basis of race, color, national origin, age, disability, sex, sexual orientation, or gender identity.            Thank you!     Thank you for choosing [facility]  for your care. Our goal is always to provide you with excellent care. Hearing back from our patients is  one way we can continue to improve our services. Please take a few minutes to complete the written survey that you may receive in the mail after your visit with us. Thank you!             Your Updated Medication List - Protect others around you: Learn how to safely use, store and throw away your medicines at www.disposemymeds.org.          This list is accurate as of 7/2/18 12:26 PM.  Always use your most recent med list.                   Brand Name Dispense Instructions for use Diagnosis    acetaminophen 500 MG tablet    TYLENOL    100 tablet    Take 2 tablets (1,000 mg) by mouth every 8 hours    Acute post-operative pain       amylase-lipase-protease 12234-04651 units Cpep per EC capsule    CREON    450 capsule    Take 2-3 with meals / 1-2 with snacks, up to 15 per day.    Necrotizing pancreatitis       blood glucose monitoring test strip    ONETOUCH VERIO IQ    100 each    Use to test blood sugar 4 times daily or as directed.    Type 2 diabetes mellitus with diabetic polyneuropathy, with long-term current use of insulin (H)       capecitabine 500 MG tablet CHEMO    XELODA    126 tablet    Take 3 tablets (1,500 mg) by mouth 2 times daily for 21 days Days 1 through 21, then 7 days off. Take within 30 mins after a meal.    Pancreatic adenocarcinoma (H)       docusate sodium 100 MG capsule    COLACE    60 capsule    Take 1 capsule (100 mg) by mouth daily    Other constipation       insulin  UNIT/ML injection    HumuLIN N/NovoLIN N    3 vial    Inject 10 units twice daily on infusion days, 5 units twice daily on all other days.    Type 2 diabetes mellitus with diabetic polyneuropathy, with long-term current use of insulin (H)       insulin pen needle 32G X 4 MM    BD DOTTY U/F    100 each    Use 2 daily as directed.    Hyperglycemia, Malignant neoplasm of pancreas, unspecified location of malignancy (H)       insulin syringes (disposable) U-100 0.3 ML Misc     1 each    Inject 10 units twice daily on infusion  days, 5 units twice daily all other days    Type 2 diabetes mellitus with diabetic polyneuropathy, with long-term current use of insulin (H)       LORazepam 0.5 MG tablet    ATIVAN    30 tablet    Take 1 tablet (0.5 mg) by mouth every 4 hours as needed (Anxiety, Nausea/Vomiting or Sleep)    Pancreatic adenocarcinoma (H)       ondansetron 8 MG tablet    ZOFRAN    30 tablet    Take 1 tablet (8 mg) by mouth every 8 hours as needed for nausea    Pancreatic adenocarcinoma (H)       ONETOUCH DELICA LANCETS 33G Misc     100 each    4 lancets daily    Type 2 diabetes mellitus with diabetic polyneuropathy, with long-term current use of insulin (H)       pantoprazole 40 MG EC tablet    PROTONIX    90 tablet    Take 1 tablet (40 mg) by mouth every morning    Pancreatic adenocarcinoma (H)       polyethylene glycol Packet    MIRALAX/GLYCOLAX    30 packet    Take 17 g by mouth daily    Drug-induced constipation       prochlorperazine 10 MG tablet    COMPAZINE    30 tablet    Take 1 tablet (10 mg) by mouth every 6 hours as needed (Nausea/Vomiting)    Pancreatic adenocarcinoma (H)

## 2018-07-02 NOTE — PATIENT INSTRUCTIONS
Contact Numbers    [facility] Main Line: [phone]  [facility] Triage and after hours / weekends / holidays:  [phone]      Please call the triage or after hours line if you experience a temperature greater than or equal to 100.5, shaking chills, have uncontrolled nausea, vomiting and/or diarrhea, dizziness, shortness of breath, chest pain, bleeding, unexplained bruising, or if you have any other new/concerning symptoms, questions or concerns.      If you are having any concerning symptoms or wish to speak to a provider before your next infusion visit, please call your care coordinator or triage to notify them so we can adequately serve you.     If you need a refill on a narcotic prescription or other medication, please call before your infusion appointment.             July 2018 Sunday Monday Tuesday Wednesday Thursday Friday Saturday   1     2     [facility]    9:00 AM   (15 min.)   [facility]     P ONC INFUSION 60    9:30 AM   (60 min.)    ONCOLOGY INFUSION   [facility] 3     4     5     6     7       8     9     [facility] DRAW    9:00 AM   (15 min.)   [facility] DRAW   [facility] Lab Draw     P ONC INFUSION 60    9:30 AM   (60 min.)    ONCOLOGY INFUSION   [facility]     UMP RETURN DIABETES   11:45 AM   (30 min.)   Es Gallo MD   [facility] Endocrinology 10     11     12     13     14       15     16     17     18     19     20     21       22     23     [facility] DRAW    8:15 AM   (15 min.)   [facility] DRAW   [facility] Lab Draw     UMP RETURN    8:25 AM   (50 min.)   Kellie Nobles PA-C   [facility]    9:30 AM   (60 min.)    ONCOLOGY INFUSION   [facility] 24     25     26     27     28       29     30     [facility] DRAW    8:45 AM   (15 min.)   [facility] DRAW   [facility] Lab Draw     P  ONC INFUSION 60    9:30 AM   (60 min.)   UC ONCOLOGY INFUSION   [facility] 31 August 2018 Sunday Monday Tuesday Wednesday Thursday Friday Saturday                  1     2     3     4       5     6     [facility] DRAW    8:30 AM   (15 min.)    [facility]     [facility] ONC INFUSION 60    9:00 AM   (60 min.)    ONCOLOGY INFUSION   [facility] 7     8     9     10     11       12     13     14     15     16     17     18       19     20     21     22     [facility] DRAW    7:15 AM   (15 min.)    [facility]     [facility] RETURN    7:35 AM   (50 min.)   Kellie Nobles PA-C   [facility]     UMP ONC INFUSION 60    9:00 AM   (60 min.)    ONCOLOGY INFUSION   [facility] 23     24     25       26     27     28     [facility] DRAW    9:00 AM   (15 min.)    [facility]   [facility]     [facility] ONC INFUSION 60    9:30 AM   (60 min.)    ONCOLOGY INFUSION   [facility] 29     30     31                      Recent Results (from the past 24 hour(s))   CBC with platelets differential    Collection Time: 07/02/18  9:24 AM   Result Value Ref Range    WBC 5.8 4.0 - 11.0 10e9/L    RBC Count 4.20 3.8 - 5.2 10e12/L    Hemoglobin 12.7 11.7 - 15.7 g/dL    Hematocrit 38.9 35.0 - 47.0 %    MCV 93 78 - 100 fl    MCH 30.2 26.5 - 33.0 pg    MCHC 32.6 31.5 - 36.5 g/dL    RDW 14.6 10.0 - 15.0 %    Platelet Count 440 150 - 450 10e9/L    Diff Method Automated Method     % Neutrophils 48.5 %    % Lymphocytes 42.4 %    % Monocytes 5.2 %    % Eosinophils 2.3 %    % Basophils 1.4 %    % Immature Granulocytes 0.2 %    Nucleated RBCs 2 (H) 0 /100    Absolute Neutrophil 2.8 1.6 - 8.3 10e9/L    Absolute Lymphocytes 2.4 0.8 - 5.3 10e9/L    Absolute Monocytes 0.3 0.0 - 1.3 10e9/L    Absolute Eosinophils 0.1 0.0 - 0.7 10e9/L     Absolute Basophils 0.1 0.0 - 0.2 10e9/L    Abs Immature Granulocytes 0.0 0 - 0.4 10e9/L    Absolute Nucleated RBC 0.1    Comprehensive metabolic panel    Collection Time: 07/02/18  9:24 AM   Result Value Ref Range    Sodium 138 133 - 144 mmol/L    Potassium 4.5 3.4 - 5.3 mmol/L    Chloride 105 94 - 109 mmol/L    Carbon Dioxide 25 20 - 32 mmol/L    Anion Gap 8 3 - 14 mmol/L    Glucose 214 (H) 70 - 99 mg/dL    Urea Nitrogen 16 7 - 30 mg/dL    Creatinine 0.62 0.52 - 1.04 mg/dL    GFR Estimate >90 >60 mL/min/1.7m2    GFR Estimate If Black >90 >60 mL/min/1.7m2    Calcium 9.2 8.5 - 10.1 mg/dL    Bilirubin Total 0.3 0.2 - 1.3 mg/dL    Albumin 3.6 3.4 - 5.0 g/dL    Protein Total 7.6 6.8 - 8.8 g/dL    Alkaline Phosphatase 123 40 - 150 U/L    ALT 24 0 - 50 U/L    AST 23 0 - 45 U/L

## 2018-07-02 NOTE — PROGRESS NOTES
Infusion Nursing Note:  Kitty Bangura presents today for cycle 2, day 8 gemzar.    Patient seen by provider today: No   present during visit today: Not Applicable.    Note: Patient states that she has been feeling well overall, walking 1-3 miles daily. She did report pain in her left lower abdomen today 6/10.  She states she has had increased pain in that region for the past 4 days.  She has had similar pain in the past when she has been constipated.  She was constipated for about 4-5 days until she had a bowel movement yesterday.  She is taking docusate daily and took a prn dose of miralax yesterday.  RN encouraged patient to take miralax daily. She is hesitant to have loose stools so states she will start by trying a half dose daily.  Patient states that her pain is tolerable. She has pain meds at home but has not been taking any of them. She has not had any fevers or chills. Vitals at baseline for patient and labs WNL.  Patient encouraged to call if her pain is worsening or not relieved with more regular bowel movements, or if she develops any other new symptoms including fevers, chills, or increased nausea or vomiting. She verbalized understanding.      Intravenous Access:  Implanted Port.    Treatment Conditions:  Lab Results   Component Value Date    HGB 12.7 07/02/2018     Lab Results   Component Value Date    WBC 5.8 07/02/2018      Lab Results   Component Value Date    ANEU 2.8 07/02/2018     Lab Results   Component Value Date     07/02/2018      Lab Results   Component Value Date     07/02/2018                   Lab Results   Component Value Date    POTASSIUM 4.5 07/02/2018           Lab Results   Component Value Date    MAG 2.1 04/21/2018            Lab Results   Component Value Date    CR 0.62 07/02/2018                   Lab Results   Component Value Date    ILIANA 9.2 07/02/2018                Lab Results   Component Value Date    BILITOTAL 0.3 07/02/2018           Lab Results    Component Value Date    ALBUMIN 3.6 07/02/2018                    Lab Results   Component Value Date    ALT 24 07/02/2018           Lab Results   Component Value Date    AST 23 07/02/2018       Results reviewed, labs MET treatment parameters, ok to proceed with treatment.      Post Infusion Assessment:  Patient tolerated infusion without incident.  Blood return noted pre and post infusion.  Site patent and intact, free from redness, edema or discomfort.  No evidence of extravasations.  Access discontinued per protocol.    Discharge Plan:   Prescription refills given for glucose test strips.  Discharge instructions reviewed with: Patient.  Patient and/or family verbalized understanding of discharge instructions and all questions answered.  Copy of AVS reviewed with patient and/or family.  Patient will return 7/9 for next appointment.  Patient discharged in stable condition accompanied by: self and .  Departure Mode: Ambulatory.  Face to Face time: 3 minutes.    Swapna Quintanilla RN

## 2018-07-03 ENCOUNTER — TELEPHONE (OUTPATIENT)
Dept: EDUCATION SERVICES | Facility: CLINIC | Age: 58
End: 2018-07-03

## 2018-07-03 NOTE — TELEPHONE ENCOUNTER
E-mail from patient:    Good Morning Yeni    Last weeks blood sugars attached. They lowered my dexamethosome last week from 12 to 8 to try and help my numbers. This week they are higher again but not sure if the nausea and headache may contribute to anything. Seems better this morning as far as the nausea goes but my numbers are higher again.    I was also eating a snack at 8:30 to take my chemo pills 1/2 hour later. This is being adjusted time wise so I can take my chemo 1/2 hour after breakfast and dinner without needing the additional snack at night.     Did you schedule me in on July 9th at 11? Didn't see it on my chart    Life remains a regimented juggling act. Have a great 4th!     --   [employer]  [employer]  [email or web address]  [phone]

## 2018-07-09 ENCOUNTER — INFUSION THERAPY VISIT (OUTPATIENT)
Dept: ONCOLOGY | Facility: CLINIC | Age: 58
End: 2018-07-09
Attending: PHYSICIAN ASSISTANT
Payer: COMMERCIAL

## 2018-07-09 ENCOUNTER — OFFICE VISIT (OUTPATIENT)
Dept: ENDOCRINOLOGY | Facility: CLINIC | Age: 58
End: 2018-07-09
Payer: COMMERCIAL

## 2018-07-09 ENCOUNTER — APPOINTMENT (OUTPATIENT)
Dept: LAB | Facility: CLINIC | Age: 58
End: 2018-07-09
Attending: PHYSICIAN ASSISTANT
Payer: COMMERCIAL

## 2018-07-09 VITALS
DIASTOLIC BLOOD PRESSURE: 63 MMHG | SYSTOLIC BLOOD PRESSURE: 102 MMHG | BODY MASS INDEX: 26.16 KG/M2 | HEIGHT: 65 IN | WEIGHT: 157 LBS | HEART RATE: 75 BPM

## 2018-07-09 VITALS
HEART RATE: 75 BPM | TEMPERATURE: 97.7 F | BODY MASS INDEX: 26.24 KG/M2 | WEIGHT: 157.7 LBS | OXYGEN SATURATION: 96 % | DIASTOLIC BLOOD PRESSURE: 63 MMHG | SYSTOLIC BLOOD PRESSURE: 102 MMHG

## 2018-07-09 DIAGNOSIS — T38.0X5A STEROID-INDUCED HYPERGLYCEMIA: ICD-10-CM

## 2018-07-09 DIAGNOSIS — C25.9 PANCREATIC ADENOCARCINOMA (H): Primary | ICD-10-CM

## 2018-07-09 DIAGNOSIS — R73.9 STEROID-INDUCED HYPERGLYCEMIA: ICD-10-CM

## 2018-07-09 DIAGNOSIS — E08.9 DIABETES MELLITUS DUE TO UNDERLYING CONDITION WITHOUT COMPLICATION, UNSPECIFIED LONG TERM INSULIN USE STATUS: Primary | ICD-10-CM

## 2018-07-09 LAB
BASOPHILS # BLD AUTO: 0 10E9/L (ref 0–0.2)
BASOPHILS NFR BLD AUTO: 0.6 %
DIFFERENTIAL METHOD BLD: ABNORMAL
EOSINOPHIL # BLD AUTO: 0 10E9/L (ref 0–0.7)
EOSINOPHIL NFR BLD AUTO: 0.8 %
ERYTHROCYTE [DISTWIDTH] IN BLOOD BY AUTOMATED COUNT: 14.4 % (ref 10–15)
HCT VFR BLD AUTO: 36.2 % (ref 35–47)
HGB BLD-MCNC: 12 G/DL (ref 11.7–15.7)
IMM GRANULOCYTES # BLD: 0 10E9/L (ref 0–0.4)
IMM GRANULOCYTES NFR BLD: 0.3 %
LYMPHOCYTES # BLD AUTO: 2.1 10E9/L (ref 0.8–5.3)
LYMPHOCYTES NFR BLD AUTO: 58.2 %
MCH RBC QN AUTO: 30.2 PG (ref 26.5–33)
MCHC RBC AUTO-ENTMCNC: 33.1 G/DL (ref 31.5–36.5)
MCV RBC AUTO: 91 FL (ref 78–100)
MONOCYTES # BLD AUTO: 0.3 10E9/L (ref 0–1.3)
MONOCYTES NFR BLD AUTO: 7.2 %
NEUTROPHILS # BLD AUTO: 1.2 10E9/L (ref 1.6–8.3)
NEUTROPHILS NFR BLD AUTO: 32.9 %
NRBC # BLD AUTO: 0 10*3/UL
NRBC BLD AUTO-RTO: 1 /100
PLATELET # BLD AUTO: 166 10E9/L (ref 150–450)
RBC # BLD AUTO: 3.97 10E12/L (ref 3.8–5.2)
WBC # BLD AUTO: 3.6 10E9/L (ref 4–11)

## 2018-07-09 PROCEDURE — 85025 COMPLETE CBC W/AUTO DIFF WBC: CPT | Performed by: PHYSICIAN ASSISTANT

## 2018-07-09 PROCEDURE — 96367 TX/PROPH/DG ADDL SEQ IV INF: CPT

## 2018-07-09 PROCEDURE — 25000128 H RX IP 250 OP 636: Mod: ZF | Performed by: PHYSICIAN ASSISTANT

## 2018-07-09 PROCEDURE — 96413 CHEMO IV INFUSION 1 HR: CPT

## 2018-07-09 RX ORDER — HEPARIN SODIUM (PORCINE) LOCK FLUSH IV SOLN 100 UNIT/ML 100 UNIT/ML
5 SOLUTION INTRAVENOUS ONCE
Status: COMPLETED | OUTPATIENT
Start: 2018-07-09 | End: 2018-07-09

## 2018-07-09 RX ADMIN — SODIUM CHLORIDE, PRESERVATIVE FREE 5 ML: 5 INJECTION INTRAVENOUS at 11:20

## 2018-07-09 RX ADMIN — SODIUM CHLORIDE, PRESERVATIVE FREE 5 ML: 5 INJECTION INTRAVENOUS at 09:34

## 2018-07-09 RX ADMIN — SODIUM CHLORIDE 250 ML: 9 INJECTION, SOLUTION INTRAVENOUS at 10:10

## 2018-07-09 RX ADMIN — GEMCITABINE 1800 MG: 38 INJECTION INTRAVENOUS at 10:40

## 2018-07-09 RX ADMIN — DEXAMETHASONE SODIUM PHOSPHATE 8 MG: 10 INJECTION, SOLUTION INTRAMUSCULAR; INTRAVENOUS at 10:19

## 2018-07-09 ASSESSMENT — PAIN SCALES - GENERAL
PAINLEVEL: NO PAIN (0)
PAINLEVEL: NO PAIN (0)

## 2018-07-09 NOTE — NURSING NOTE
Chief Complaint   Patient presents with     Port Draw     labs drawn via port by RN     /63 (BP Location: Right arm, Patient Position: Chair, Cuff Size: Adult Regular)  Pulse 75  Temp 97.7  F (36.5  C) (Oral)  Wt 71.5 kg (157 lb 11.2 oz)  SpO2 96%  BMI 26.24 kg/m2    Vitals taken. Port accessed by RN. Labs collected and sent. Line flushed with NS & Heparin. Pt tolerated well. Pt checked in for next appointment.    Isabel Weaver RN

## 2018-07-09 NOTE — NURSING NOTE
Chief Complaint   Patient presents with     RECHECK     follow up diabetes      Tram Christine CMA

## 2018-07-09 NOTE — MR AVS SNAPSHOT
After Visit Summary   7/9/2018    Kitty Bangura    MRN: [id number]           Patient Information     Date Of Birth          1960        Visit Information        Provider Department      7/9/2018 12:00 PM Es Gallo MD [facility] Endocrinology        Today's Diagnoses     Diabetes mellitus due to underlying condition without complication, unspecified long term insulin use status (H)    -  1    Steroid-induced hyperglycemia          Care Instructions    On infusion days, increase NPH insulin dose to 12 units in the morning and 12 units with dinner.  Continue insulin doses on other days the same.   Call or send a [employer] message when you need an insulin refill - we will send a prescription for pens to your pharmacy.           Follow-ups after your visit        Follow-up notes from your care team     Return in about 3 months (around 10/9/2018).      Your next 10 appointments already scheduled     Jul 23, 2018  8:15 AM CDT   [facility]ic Lab Draw with UC MASONIC LAB DRAW   [facility] Masonic Lab Draw ([facility])    [address] [place] [address]  [address]  [facility] [address]   [phone]            Jul 23, 2018  8:40 AM CDT   (Arrive by 8:25 AM)   Return Visit with Kellie Nobles PA-C   [facility] ([facility])    [address] [place] [address]  [address]  [facility] [address]   [phone]            Jul 23, 2018  9:30 AM CDT   Infusion 60 with UC ONCOLOGY INFUSION, UC 22 ATC   [facility] ([facility])    [address] [place] [address]  [address]  [facility] [address]   [phone]            Jul 30, 2018  8:45 AM CDT   Masonic Lab Draw with UC MASONIC LAB DRAW   [facility] Masonic Lab Draw ([facility])    [address] [place] [address]  [address]  [facility] [address]   [phone]            Jul 30, 2018  9:30 AM CDT   Infusion 60 with UC ONCOLOGY INFUSION, UC 21  "ATC   [facility] ([facility])    [address]  [address]  [facility] [address] [phone]            Aug 06, 2018  8:30 AM CDT   Masonic Lab Draw with UC MASONIC LAB DRAW   [facility] Lab Draw ([facility])    [address]  [address]  [facility] [address]   [phone]            Aug 06, 2018  9:00 AM CDT   Infusion 60 with UC ONCOLOGY INFUSION, UC 25 ATC   [facility] ([facility])    [address]  [address]  [facility] [address] [phone]              Who to contact     Please call your clinic at [phone] to:    Ask questions about your health    Make or cancel appointments    Discuss your medicines    Learn about your test results    Speak to your doctor            Additional Information About Your Visit        [employer] Information     [employer] gives you secure access to your electronic health record. If you see a primary care provider, you can also send messages to your care team and make appointments. If you have questions, please call your primary care clinic.  If you do not have a primary care provider, please call [phone] and they will assist you.      [employer] is an electronic gateway that provides easy, online access to your medical records. With [employer], you can request a clinic appointment, read your test results, renew a prescription or communicate with your care team.     To access your existing account, please contact your [facility] or call [phone] for assistance.        Care EveryWhere ID     This is your Care EveryWhere ID. This could be used by other organizations to access your [place] medical records  [id number]        Your Vitals Were     Pulse Height BMI (Body Mass Index)             75 5' 5\" (1.651 m) 26.13 kg/m2          Blood Pressure from Last 3 Encounters:   07/09/18 102/63 "   07/09/18 102/63   07/02/18 95/65    Weight from Last 3 Encounters:   07/09/18 157 lb (71.2 kg)   07/09/18 157 lb 11.2 oz (71.5 kg)   07/02/18 151 lb 12.8 oz (68.9 kg)              Today, you had the following     No orders found for display       Primary Care Provider Office Phone # Fax #    Solange Mcgill [phone] [address] [facility] [facility] [address]        Equal Access to Services     [facility]: Hadii zaheer guerrero hadasho Soomaali, waaxda luqadaha, qaybta kaalmada adetristen, re gerardo . So [facility] [phone].    ATENCIÓN: Si habla español, tiene a quiros disposición servicios gratuitos de asistencia lingüística. [facility] [phone].    We comply with applicable federal civil rights laws and [place] laws. We do not discriminate on the basis of race, color, national origin, age, disability, sex, sexual orientation, or gender identity.            Thank you!     Thank you for choosing [facility] ENDOCRINOLOGY  for your care. Our goal is always to provide you with excellent care. Hearing back from our patients is one way we can continue to improve our services. Please take a few minutes to complete the written survey that you may receive in the mail after your visit with us. Thank you!             Your Updated Medication List - Protect others around you: Learn how to safely use, store and throw away your medicines at www.disposemymeds.org.          This list is accurate as of 7/9/18 11:59 PM.  Always use your most recent med list.                   Brand Name Dispense Instructions for use Diagnosis    acetaminophen 500 MG tablet    TYLENOL    100 tablet    Take 2 tablets (1,000 mg) by mouth every 8 hours    Acute post-operative pain       amylase-lipase-protease 16533-64640 units Cpep per EC capsule    CREON    450 capsule    Take 2-3 with meals / 1-2 with snacks, up to 15 per day.    Necrotizing pancreatitis       blood glucose monitoring test strip     ONETOUCH VERIO IQ    100 each    Use to test blood sugar 4 times daily or as directed.    Type 2 diabetes mellitus with diabetic polyneuropathy, with long-term current use of insulin (H)       capecitabine 500 MG tablet CHEMO    XELODA    126 tablet    Take 3 tablets (1,500 mg) by mouth 2 times daily for 21 days Days 1 through 21, then 7 days off. Take within 30 mins after a meal.    Pancreatic adenocarcinoma (H)       docusate sodium 100 MG capsule    COLACE    60 capsule    Take 1 capsule (100 mg) by mouth daily    Other constipation       insulin  UNIT/ML injection    HumuLIN N/NovoLIN N    3 vial    Inject 10 units twice daily on infusion days, 5 units twice daily on all other days.    Type 2 diabetes mellitus with diabetic polyneuropathy, with long-term current use of insulin (H)       insulin pen needle 32G X 4 MM    BD DOTTY U/F    100 each    Use 2 daily as directed.    Hyperglycemia, Malignant neoplasm of pancreas, unspecified location of malignancy (H)       insulin syringes (disposable) U-100 0.3 ML Misc     1 each    Inject 10 units twice daily on infusion days, 5 units twice daily all other days    Type 2 diabetes mellitus with diabetic polyneuropathy, with long-term current use of insulin (H)       LORazepam 0.5 MG tablet    ATIVAN    30 tablet    Take 1 tablet (0.5 mg) by mouth every 4 hours as needed (Anxiety, Nausea/Vomiting or Sleep)    Pancreatic adenocarcinoma (H)       ondansetron 8 MG tablet    ZOFRAN    30 tablet    Take 1 tablet (8 mg) by mouth every 8 hours as needed for nausea    Pancreatic adenocarcinoma (H)       ONETOUCH DELICA LANCETS 33G Misc     100 each    4 lancets daily    Type 2 diabetes mellitus with diabetic polyneuropathy, with long-term current use of insulin (H)       pantoprazole 40 MG EC tablet    PROTONIX    90 tablet    Take 1 tablet (40 mg) by mouth every morning    Pancreatic adenocarcinoma (H)       polyethylene glycol Packet    MIRALAX/GLYCOLAX    30 packet     Take 17 g by mouth daily    Drug-induced constipation       prochlorperazine 10 MG tablet    COMPAZINE    30 tablet    Take 1 tablet (10 mg) by mouth every 6 hours as needed (Nausea/Vomiting)    Pancreatic adenocarcinoma (H)

## 2018-07-09 NOTE — MR AVS SNAPSHOT
After Visit Summary   7/9/2018    Kitty Bangura    MRN: [id number]           Patient Information     Date Of Birth          1960        Visit Information        Provider Department      7/9/2018 9:30 AM  22 ATC;  ONCOLOGY INFUSION [facility]        Today's Diagnoses     Pancreatic adenocarcinoma (H)    -  1      Care Instructions    Contact Numbers    [facility] Main Line: [phone]  [facility] Triage and after hours / weekends / holidays:  [phone]      Please call the triage or after hours line if you experience a temperature greater than or equal to 100.5, shaking chills, have uncontrolled nausea, vomiting and/or diarrhea, dizziness, shortness of breath, chest pain, bleeding, unexplained bruising, or if you have any other new/concerning symptoms, questions or concerns.      If you are having any concerning symptoms or wish to speak to a provider before your next infusion visit, please call your care coordinator or triage to notify them so we can adequately serve you.     If you need a refill on a narcotic prescription or other medication, please call before your infusion appointment.                   July 2018 Sunday Monday Tuesday Wednesday Thursday Friday Saturday   1     2     [facility] MASONIC LAB DRAW    9:00 AM   (15 min.)   [facility] LAB DRAW   [facility] Lab Draw     [facility] ONC INFUSION 60    9:30 AM   (60 min.)    ONCOLOGY INFUSION   [facility] 3     4     5     6     7       8     9     [facility] MASONIC LAB DRAW    9:00 AM   (15 min.)    MASONIC LAB DRAW   [facility] Lab Draw     [facility] ONC INFUSION 60    9:30 AM   (60 min.)    ONCOLOGY INFUSION   [facility]     LONG   10:45 AM   (30 min.)   Yeni Mota, BRENDAN   [facility] Diabetes     P RETURN DIABETES   11:45 AM   (30 min.)   Es Gallo MD   [facility] Endocrinology 10     11     12     13     14       15     16     17     18     19     20     21        22     23     [facility]    8:15 AM   (15 min.)   [facility]   [facility] Masonic Lab Draw     [facility] RETURN    8:25 AM   (50 min.)   Kellie Nobles PA-C   [facility]     [facility] ONC INFUSION 60    9:30 AM   (60 min.)    ONCOLOGY INFUSION   [facility] 24     25     26     27     28       29     30     [facility] LAB DRAW    8:45 AM   (15 min.)    MASONIC LAB DRAW   [facility] Masonic Lab Draw     [facility] ONC INFUSION 60    9:30 AM   (60 min.)    ONCOLOGY INFUSION   [facility] 31 August 2018 Sunday Monday Tuesday Wednesday Thursday Friday Saturday                  1     2     3     4       5     6     [facility] LAB DRAW    8:30 AM   (15 min.)    MASONIC LAB DRAW   [facility] Masonic Lab Draw     [facility] ONC INFUSION 60    9:00 AM   (60 min.)    ONCOLOGY INFUSION   [facility] 7     8     9     10     11       12     13     14     15     16     17     18       19     20     21     22     [facility] LAB DRAW    7:15 AM   (15 min.)    [facility]     [facility] RETURN    7:35 AM   (50 min.)   Kellie Nobles PA-C   [facility]     [facility] ONC INFUSION 60    9:00 AM   (60 min.)    ONCOLOGY INFUSION   [facility] 23     24     25       26     27     28     [facility] LAB DRAW    9:00 AM   (15 min.)    MASONIC LAB DRAW   [facility] Masonic Lab Draw     [facility] ONC INFUSION 60    9:30 AM   (60 min.)    ONCOLOGY INFUSION   [facility] 29     30     31                       Lab Results:  Recent Results (from the past 12 hour(s))   CBC with platelets differential    Collection Time: 07/09/18  9:42 AM   Result Value Ref Range    WBC 3.6 (L) 4.0 - 11.0 10e9/L    RBC Count 3.97 3.8 - 5.2 10e12/L    Hemoglobin 12.0 11.7 - 15.7 g/dL    Hematocrit 36.2 35.0 - 47.0 %    MCV 91 78 - 100 fl    MCH 30.2 26.5 -  33.0 pg    MCHC 33.1 31.5 - 36.5 g/dL    RDW 14.4 10.0 - 15.0 %    Platelet Count 166 150 - 450 10e9/L    Diff Method Automated Method     % Neutrophils 32.9 %    % Lymphocytes 58.2 %    % Monocytes 7.2 %    % Eosinophils 0.8 %    % Basophils 0.6 %    % Immature Granulocytes 0.3 %    Nucleated RBCs 1 (H) 0 /100    Absolute Neutrophil 1.2 (L) 1.6 - 8.3 10e9/L    Absolute Lymphocytes 2.1 0.8 - 5.3 10e9/L    Absolute Monocytes 0.3 0.0 - 1.3 10e9/L    Absolute Eosinophils 0.0 0.0 - 0.7 10e9/L    Absolute Basophils 0.0 0.0 - 0.2 10e9/L    Abs Immature Granulocytes 0.0 0 - 0.4 10e9/L    Absolute Nucleated RBC 0.0                Follow-ups after your visit        Your next 10 appointments already scheduled     Jul 09, 2018 12:00 PM CDT   (Arrive by 11:45 AM)   RETURN DIABETES with Es Gallo MD   [facility] Endocrinology [facility])    [address] [place] [address]  3rd Floor  [facility] [address]   [phone]            Jul 23, 2018  8:15 AM CDT   Masonic Lab Draw with  MASONIC LAB DRAW   [facility]    [address] [place] [address]  [address]  [facility] [address]   [phone]            Jul 23, 2018  8:40 AM CDT   (Arrive by 8:25 AM)   Return Visit with Kellie Nobles PA-C   [facility] ([facility])    [address] [place] [address]  [address]  [facility] [address]   [phone]            Jul 23, 2018  9:30 AM CDT   Infusion 60 with UC ONCOLOGY INFUSION, UC 22 ATC   [facility] ([facility])    [address] [place] [address]  [address]  [facility] [address]   [phone]            Jul 30, 2018  8:45 AM CDT   Masonic Lab Draw with UC MASONIC LAB DRAW   [facility] Masonic Lab Draw ([facility])    [address] [place] [address]  [address] [place] [address]   [phone]            Jul 30, 2018  9:30 AM CDT   Infusion 60 with UC  ONCOLOGY INFUSION, UC 21 ATC   [facility] ([facility])    [address]  [address]  [facility] [address] [phone]            Aug 06, 2018  8:30 AM CDT   Masonic Lab Draw with  MASONIC LAB DRAW   [facility] Lab Draw ([facility])    [address]  [address]  [facility] [address] [phone]            Aug 06, 2018  9:00 AM CDT   Infusion 60 with UC ONCOLOGY INFUSION   [facility] ([facility])    [address] [place] [address]  [address]  [facility] [address]   [phone]              Who to contact     If you have questions or need follow up information about today's clinic visit or your schedule please contact [facility] directly at [phone].  Normal or non-critical lab and imaging results will be communicated to you by MyChart, letter or phone within 4 business days after the clinic has received the results. If you do not hear from us within 7 days, please contact the clinic through [employer] or phone. If you have a critical or abnormal lab result, we will notify you by phone as soon as possible.  Submit refill requests through [employer] or call your pharmacy and they will forward the refill request to us. Please allow 3 business days for your refill to be completed.          Additional Information About Your Visit        [employer] Information     [employer] gives you secure access to your electronic health record. If you see a primary care provider, you can also send messages to your care team and make appointments. If you have questions, please call your primary care clinic.  If you do not have a primary care provider, please call [phone] and they will assist you.        Care EveryWhere ID     This is your Care EveryWhere ID. This could be used by other organizations to access your [place] medical records  [id number]        Your Vitals Were      Pulse Temperature Pulse Oximetry BMI (Body Mass Index)          75 97.7  F (36.5  C) (Oral) 96% 26.24 kg/m2         Blood Pressure from Last 3 Encounters:   07/09/18 102/63   07/02/18 95/65   06/26/18 100/64    Weight from Last 3 Encounters:   07/09/18 71.5 kg (157 lb 11.2 oz)   07/02/18 68.9 kg (151 lb 12.8 oz)   06/26/18 69.4 kg (153 lb 1.6 oz)              We Performed the Following     CBC with platelets differential        Primary Care Provider Office Phone # Fax #    Solange Mcgill [phone] [address] [address]        Equal Access to Services     CHASE DE GUZMAN : Tre Ruiz, waivy luqadaha, qaybta kaalmada adetristen, re gerardo . So [facility] [phone].    ATENCIÓN: Si habla español, tiene a quiros disposición servicios gratuitos de asistencia lingüística. [facility] [phone].    We comply with applicable federal civil rights laws and [place] laws. We do not discriminate on the basis of race, color, national origin, age, disability, sex, sexual orientation, or gender identity.            Thank you!     Thank you for choosing [facility] CANCER [facility]  for your care. Our goal is always to provide you with excellent care. Hearing back from our patients is one way we can continue to improve our services. Please take a few minutes to complete the written survey that you may receive in the mail after your visit with us. Thank you!             Your Updated Medication List - Protect others around you: Learn how to safely use, store and throw away your medicines at www.disposemymeds.org.          This list is accurate as of 7/9/18 11:34 AM.  Always use your most recent med list.                   Brand Name Dispense Instructions for use Diagnosis    acetaminophen 500 MG tablet    TYLENOL    100 tablet    Take 2 tablets (1,000 mg) by mouth every 8 hours    Acute post-operative pain       amylase-lipase-protease  50729-01963 units Cpep per EC capsule    CREON    450 capsule    Take 2-3 with meals / 1-2 with snacks, up to 15 per day.    Necrotizing pancreatitis       blood glucose monitoring test strip    ONETOUCH VERIO IQ    100 each    Use to test blood sugar 4 times daily or as directed.    Type 2 diabetes mellitus with diabetic polyneuropathy, with long-term current use of insulin (H)       capecitabine 500 MG tablet CHEMO    XELODA    126 tablet    Take 3 tablets (1,500 mg) by mouth 2 times daily for 21 days Days 1 through 21, then 7 days off. Take within 30 mins after a meal.    Pancreatic adenocarcinoma (H)       docusate sodium 100 MG capsule    COLACE    60 capsule    Take 1 capsule (100 mg) by mouth daily    Other constipation       insulin  UNIT/ML injection    HumuLIN N/NovoLIN N    3 vial    Inject 10 units twice daily on infusion days, 5 units twice daily on all other days.    Type 2 diabetes mellitus with diabetic polyneuropathy, with long-term current use of insulin (H)       insulin pen needle 32G X 4 MM    BD DOTTY U/F    100 each    Use 2 daily as directed.    Hyperglycemia, Malignant neoplasm of pancreas, unspecified location of malignancy (H)       insulin syringes (disposable) U-100 0.3 ML Misc     1 each    Inject 10 units twice daily on infusion days, 5 units twice daily all other days    Type 2 diabetes mellitus with diabetic polyneuropathy, with long-term current use of insulin (H)       LORazepam 0.5 MG tablet    ATIVAN    30 tablet    Take 1 tablet (0.5 mg) by mouth every 4 hours as needed (Anxiety, Nausea/Vomiting or Sleep)    Pancreatic adenocarcinoma (H)       ondansetron 8 MG tablet    ZOFRAN    30 tablet    Take 1 tablet (8 mg) by mouth every 8 hours as needed for nausea    Pancreatic adenocarcinoma (H)       ONETOUCH DELICA LANCETS 33G Misc     100 each    4 lancets daily    Type 2 diabetes mellitus with diabetic polyneuropathy, with long-term current use of insulin (H)        pantoprazole 40 MG EC tablet    PROTONIX    90 tablet    Take 1 tablet (40 mg) by mouth every morning    Pancreatic adenocarcinoma (H)       polyethylene glycol Packet    MIRALAX/GLYCOLAX    30 packet    Take 17 g by mouth daily    Drug-induced constipation       prochlorperazine 10 MG tablet    COMPAZINE    30 tablet    Take 1 tablet (10 mg) by mouth every 6 hours as needed (Nausea/Vomiting)    Pancreatic adenocarcinoma (H)

## 2018-07-09 NOTE — PATIENT INSTRUCTIONS
Contact Numbers    [facility] [place]: [phone]  [facility] [place] and after hours / weekends / holidays:  [phone]      Please call the triage or after hours line if you experience a temperature greater than or equal to 100.5, shaking chills, have uncontrolled nausea, vomiting and/or diarrhea, dizziness, shortness of breath, chest pain, bleeding, unexplained bruising, or if you have any other new/concerning symptoms, questions or concerns.      If you are having any concerning symptoms or wish to speak to a provider before your next infusion visit, please call your care coordinator or triage to notify them so we can adequately serve you.     If you need a refill on a narcotic prescription or other medication, please call before your infusion appointment.                   July 2018 Sunday Monday Tuesday Wednesday Thursday Friday Saturday   1     2     [facility]    9:00 AM   (15 min.)    [facility]   [facility] Lab Draw     [facility] ONC INFUSION 60    9:30 AM   (60 min.)    ONCOLOGY INFUSION   [facility] 3     4     5     6     7       8     9     [facility]    9:00 AM   (15 min.)    [facility] DRAW   [facility] Lab Draw     [facility] ONC INFUSION 60    9:30 AM   (60 min.)    ONCOLOGY INFUSION   [facility]     LONG   10:45 AM   (30 min.)   Yeni Mota RN   [facility] Diabetes     [facility] RETURN DIABETES   11:45 AM   (30 min.)   Es Gallo MD   [facility] Endocrinology 10     11     12     13     14       15     16     17     18     19     20     21       22     23     [facility]    8:15 AM   (15 min.)    [facility] DRAW   [facility] Lab Draw     [facility] RETURN    8:25 AM   (50 min.)   Kellie Nobles PA-C   [facility]     [facility] ONC INFUSION 60    9:30 AM   (60 min.)    ONCOLOGY INFUSION   [facility] 24     25     26     27     28       29     30     [facility] DRAW     8:45 AM   (15 min.)    [facility] DRAW   [facility] Draw     [facility] ONC INFUSION 60    9:30 AM   (60 min.)    ONCOLOGY INFUSION   [facility] 31 August 2018 Sunday Monday Tuesday Wednesday Thursday Friday Saturday                  1     2     3     4       5     6     [facility] DRAW    8:30 AM   (15 min.)    [facility] DRAW   [facility] Draw     [facility] ONC INFUSION 60    9:00 AM   (60 min.)   [facility] ONCOLOGY INFUSION   [facility] 7     8     9     10     11       12     13     14     15     16     17     18       19     20     21     22     [facility]    7:15 AM   (15 min.)    [facility]   [facility]     [facility] RETURN    7:35 AM   (50 min.)   Kellie Nobles PA-C   [facility]     [facility] ONC INFUSION 60    9:00 AM   (60 min.)    ONCOLOGY INFUSION   [facility] 23     24     25       26     27     28     [facility] DRAW    9:00 AM   (15 min.)    [facility] DRAW   [facility] Draw     [facility] ONC INFUSION 60    9:30 AM   (60 min.)    ONCOLOGY INFUSION   [facility] 29     30     31                       Lab Results:  Recent Results (from the past 12 hour(s))   CBC with platelets differential    Collection Time: 07/09/18  9:42 AM   Result Value Ref Range    WBC 3.6 (L) 4.0 - 11.0 10e9/L    RBC Count 3.97 3.8 - 5.2 10e12/L    Hemoglobin 12.0 11.7 - 15.7 g/dL    Hematocrit 36.2 35.0 - 47.0 %    MCV 91 78 - 100 fl    MCH 30.2 26.5 - 33.0 pg    MCHC 33.1 31.5 - 36.5 g/dL    RDW 14.4 10.0 - 15.0 %    Platelet Count 166 150 - 450 10e9/L    Diff Method Automated Method     % Neutrophils 32.9 %    % Lymphocytes 58.2 %    % Monocytes 7.2 %    % Eosinophils 0.8 %    % Basophils 0.6 %    % Immature Granulocytes 0.3 %    Nucleated RBCs 1 (H) 0 /100    Absolute Neutrophil 1.2 (L) 1.6 - 8.3 10e9/L    Absolute Lymphocytes 2.1  0.8 - 5.3 10e9/L    Absolute Monocytes 0.3 0.0 - 1.3 10e9/L    Absolute Eosinophils 0.0 0.0 - 0.7 10e9/L    Absolute Basophils 0.0 0.0 - 0.2 10e9/L    Abs Immature Granulocytes 0.0 0 - 0.4 10e9/L    Absolute Nucleated RBC 0.0

## 2018-07-09 NOTE — PATIENT INSTRUCTIONS
On infusion days, increase NPH insulin dose to 12 units in the morning and 12 units with dinner.  Continue insulin doses on other days the same.   Call or send a [employer] message when you need an insulin refill - we will send a prescription for pens to your pharmacy.

## 2018-07-09 NOTE — PROGRESS NOTES
Infusion Nursing Note:  Kitty Bangura presents today for Day 15 Cycle 2 Gemzar and Xeloda.    Patient seen by provider today: No   present during visit today: Not Applicable.    Note: Patient reported that on the Fourth of July, she had a fever of 100.0F which resolved the next morning after taking Tylenol. The patient has not been febrile since 7/4/18. The patient also reported a mild runny nose, cough, and fatigue that resolved on 7/6/18. Reports mild nausea that is controlled by taking PRN antiemetics. Reports constipation that is relieved by taking Colace and Miralax as needed.    The patient reports that she has been feeling much better since 7/6/18. Denies any pain today.    Intravenous Access:  Implanted Port.    Treatment Conditions:  Lab Results   Component Value Date    HGB 12.0 07/09/2018     Lab Results   Component Value Date    WBC 3.6 07/09/2018      Lab Results   Component Value Date    ANEU 1.2 07/09/2018     Lab Results   Component Value Date     07/09/2018      Lab Results   Component Value Date     07/02/2018                   Lab Results   Component Value Date    POTASSIUM 4.5 07/02/2018           Lab Results   Component Value Date    MAG 2.1 04/21/2018            Lab Results   Component Value Date    CR 0.62 07/02/2018                   Lab Results   Component Value Date    ILIANA 9.2 07/02/2018                Lab Results   Component Value Date    BILITOTAL 0.3 07/02/2018           Lab Results   Component Value Date    ALBUMIN 3.6 07/02/2018                    Lab Results   Component Value Date    ALT 24 07/02/2018           Lab Results   Component Value Date    AST 23 07/02/2018       Results reviewed, labs MET treatment parameters, ok to proceed with treatment.    ANC: 1.2; patient educated on neutropenic precautions. Instructed to call the nurse triage line if patient develops a fever of 100.4F or greater. Patient verbalized understanding.    Post Infusion  Assessment:  Patient tolerated infusion without incident.  Blood return noted pre and post infusion.  Site patent and intact, free from redness, edema or discomfort.  No evidence of extravasations.  Access discontinued per protocol.    Discharge Plan:   Prescription refills given for Creon.  Discharge instructions reviewed with: Patient and Family.  Patient and/or family verbalized understanding of discharge instructions and all questions answered.  AVS to patient via [employer].  Patient will return 7/23/18 for next appointment.  Patient discharged in stable condition accompanied by: self and .  Departure Mode: Ambulatory.  Face to Face time: 0 minutes.    Mario Brown RN

## 2018-07-09 NOTE — LETTER
7/9/2018       RE: Kitty Bangura  [address] [facility] [address]     Dear Colleague,    Thank you for referring your patient, Kitty Bangura, to the [facility] ENDOCRINOLOGY at [facility]. Please see a copy of my visit note below.    Endocrinology and Diabetes Clinic    Subjective:   Kitty Bangura is a 57 year old woman here for follow up of type 2 diabetes mellitus and steroid induced hyperglycemia.     She is now receiving chemotherapy infusions on Mondays. Steroid dose was reduced from 12 mg IV dexamethasone to 8 mg with infusions, and this will be the dose going forward. She will continue with current plan through August, and then will meet with her oncologist with repeat imaging in September.     Current insulin doses are NPH 10 units in the morning and 10 units with supper on infusion days, and 6 units in the morning and 9 units with supper on all other days. The non-infusion day morning dose was recently decreased because of blood sugars in the 70s-80s before lunch. She will feel shaky when blood sugar is <90. She has not experienced any further episodes since the dose change.     Review of her record over the past week showed fasting blood sugars of 115-142, pre-lunch 100-148, pre-supper 125-186, and before bed 106-187 on non-infusion days. On the day of her infusion blood sugar christie to 245 before lunch, 280 before bed, and was 254 the next morning.       Medications:   Current Outpatient Prescriptions   Medication Sig Dispense Refill     acetaminophen (TYLENOL) 500 MG tablet Take 2 tablets (1,000 mg) by mouth every 8 hours 100 tablet 1     amylase-lipase-protease (CREON) 22146-96068 UNITS CPEP per EC capsule Take 2-3 with meals / 1-2 with snacks, up to 15 per day. 450 capsule 6     blood glucose monitoring (ONETOUCH VERIO IQ) test strip Use to test blood sugar 4 times daily or as directed. 100 each 3     capecitabine (XELODA) 500 MG tablet CHEMO Take 3  tablets (1,500 mg) by mouth 2 times daily for 21 days Days 1 through 21, then 7 days off. Take within 30 mins after a meal. 126 tablet 0     docusate sodium (COLACE) 100 MG capsule Take 1 capsule (100 mg) by mouth daily 60 capsule 1     insulin NPH (HUMULIN N/NOVOLIN N) 100 UNIT/ML injection Inject 10 units twice daily on infusion days, 5 units twice daily on all other days. 3 vial 3     insulin pen needle (BD DOTTY U/F) 32G X 4 MM Use 2 daily as directed. 100 each 11     insulin syringes, disposable, U-100 0.3 ML MISC Inject 10 units twice daily on infusion days, 5 units twice daily all other days 1 each 3     LORazepam (ATIVAN) 0.5 MG tablet Take 1 tablet (0.5 mg) by mouth every 4 hours as needed (Anxiety, Nausea/Vomiting or Sleep) 30 tablet 2     magic mouthwash (FIRST-MOUTHWASH BLM) suspension Swish and swallow 5-10 mLs in mouth every 6 hours as needed for mouth sores 237 mL 1     nystatin (MYCOSTATIN) 478057 UNIT/ML suspension Take 5 mLs (500,000 Units) by mouth 4 times daily Swish and swallow 4 times daily 280 mL 1     ondansetron (ZOFRAN) 8 MG tablet Take 1 tablet (8 mg) by mouth every 8 hours as needed for nausea 30 tablet 0     ONETOUCH DELICA LANCETS 33G MISC 4 lancets daily 100 each 3     pantoprazole (PROTONIX) 40 MG EC tablet Take 1 tablet (40 mg) by mouth every morning 90 tablet 1     polyethylene glycol (MIRALAX/GLYCOLAX) Packet Take 17 g by mouth daily 30 packet 0     prochlorperazine (COMPAZINE) 10 MG tablet Take 1 tablet (10 mg) by mouth every 6 hours as needed (Nausea/Vomiting) 30 tablet 2     Social History:  Social History   Substance Use Topics     Smoking status: Former Smoker     Packs/day: 0.50     Years: 4.00     Types: Cigarettes     Start date: 1/1/1974     Quit date: 1981     Smokeless tobacco: Never Used     Alcohol use No      Comment: Now quit since Oct 31.  Moderate drinker in past     Review of Systems:   GENERAL: Was sick last week with chills and fever, which has improved. Appetite  "is okay, but was affected last week by illness.   SKIN: Negative  HENT: Negative   EYE: Blurry vision which has progressed despite improvement in blood sugars.  HEART: Negative  RESPIRATORY: Negative   GI:She has some nausea.   : Negative  MSK: Negative  BLOOD/LYMPH: Negative  NEUROLOGIC: Tingling in her toes.   PSYCH: Negative    Physical Examination:  Blood pressure 102/63, pulse 75, height 5' 5\" (1.651 m), weight 157 lb (71.2 kg), not currently breastfeeding.  Body mass index is 26.13 kg/(m^2).    Wt Readings from Last 4 Encounters:   07/09/18 157 lb (71.2 kg)   07/09/18 157 lb 11.2 oz (71.5 kg)   07/02/18 151 lb 12.8 oz (68.9 kg)   06/26/18 153 lb 1.6 oz (69.4 kg)     General: Well appearing woman in no distress.   Eyes: EOMI. Sclerae and conjunctivae are clear.    HENT: No thyromegaly or mass.    Lymphatic: No cervical lymphadenopathy.  Cardiovascular: RRR, with normal S1+S2 and no murmurs.   Respiratory: Lungs are clear to auscultation.   Gastrointestinal: Abdomen is soft, non tender, and non-distended.   Extremities: No peripheral edema. Feet are warm and well perfused. No open lesions or ulcers.   Neurologic: Sensation to light touch is intact in the feet bilaterally.     Labs and Studies:   Component      Latest Ref Rng & Units 5/30/2018   Hemoglobin A1C      0 - 5.6 % 7.8 (H)     Component      Latest Ref Rng & Units 7/2/2018   Creatinine      0.52 - 1.04 mg/dL 0.62       Assessment:  1. Type 2 diabetes mellitus, with improved glycemic control since starting NPH insulin.   2. Steroid induced hyperglycemia on infusion days, when she receives IV dexamethasone.   3. Pancreatic adenocarcinoma s/p resection and now undergoing treatment with chemotherapy.   4. History of necrotizing pancreatitis during treatment for above.      Plan:   - On infusion days, increase NPH insulin dose to 12 units in the morning and 12 units with dinner.  - Continue insulin doses on other days the same.   - Send blood sugars to me " or to Yeni Mota between appointments for review and insulin dose adjustment. Next week will be an off week (no infusion) so she can send in 2 weeks after her next infusion day.   - Follow up in clinic in 3 months with Latricia Bustillos and in 6 months with me.   - There was an issue with coverage for NPH pens that is now resolved. She will use up her current supply of NPH vials, and will contact us when she is ready for a refill for a pen prescription. She already has pen needles at home.     Es Gallo MD   Diabetes and Endocrinology   [phone]

## 2018-07-11 ENCOUNTER — TELEPHONE (OUTPATIENT)
Dept: ONCOLOGY | Facility: CLINIC | Age: 58
End: 2018-07-11

## 2018-07-11 DIAGNOSIS — K13.79 MOUTH SORES: ICD-10-CM

## 2018-07-11 RX ORDER — NYSTATIN 100000/ML
500000 SUSPENSION, ORAL (FINAL DOSE FORM) ORAL 4 TIMES DAILY
Qty: 280 ML | Refills: 1 | Status: SHIPPED | OUTPATIENT
Start: 2018-07-11 | End: 2018-10-09

## 2018-07-11 RX ORDER — DIPHENHYDRAMINE HYDROCHLORIDE AND LIDOCAINE HYDROCHLORIDE AND ALUMINUM HYDROXIDE AND MAGNESIUM HYDRO
5-10 KIT EVERY 6 HOURS PRN
Qty: 237 ML | Refills: 1 | Status: SHIPPED | OUTPATIENT
Start: 2018-07-11 | End: 2018-10-09

## 2018-07-14 NOTE — PROGRESS NOTES
Endocrinology and Diabetes Clinic    Subjective:   Kitty Bangura is a 57 year old woman here for follow up of type 2 diabetes mellitus and steroid induced hyperglycemia.     She is now receiving chemotherapy infusions on Mondays. Steroid dose was reduced from 12 mg IV dexamethasone to 8 mg with infusions, and this will be the dose going forward. She will continue with current plan through August, and then will meet with her oncologist with repeat imaging in September.     Current insulin doses are NPH 10 units in the morning and 10 units with supper on infusion days, and 6 units in the morning and 9 units with supper on all other days. The non-infusion day morning dose was recently decreased because of blood sugars in the 70s-80s before lunch. She will feel shaky when blood sugar is <90. She has not experienced any further episodes since the dose change.     Review of her record over the past week showed fasting blood sugars of 115-142, pre-lunch 100-148, pre-supper 125-186, and before bed 106-187 on non-infusion days. On the day of her infusion blood sugar christie to 245 before lunch, 280 before bed, and was 254 the next morning.       Medications:   Current Outpatient Prescriptions   Medication Sig Dispense Refill     acetaminophen (TYLENOL) 500 MG tablet Take 2 tablets (1,000 mg) by mouth every 8 hours 100 tablet 1     amylase-lipase-protease (CREON) 11603-95660 UNITS CPEP per EC capsule Take 2-3 with meals / 1-2 with snacks, up to 15 per day. 450 capsule 6     blood glucose monitoring (ONETOUCH VERIO IQ) test strip Use to test blood sugar 4 times daily or as directed. 100 each 3     capecitabine (XELODA) 500 MG tablet CHEMO Take 3 tablets (1,500 mg) by mouth 2 times daily for 21 days Days 1 through 21, then 7 days off. Take within 30 mins after a meal. 126 tablet 0     docusate sodium (COLACE) 100 MG capsule Take 1 capsule (100 mg) by mouth daily 60 capsule 1     insulin NPH (HUMULIN N/NOVOLIN N) 100 UNIT/ML  injection Inject 10 units twice daily on infusion days, 5 units twice daily on all other days. 3 vial 3     insulin pen needle (BD DOTTY U/F) 32G X 4 MM Use 2 daily as directed. 100 each 11     insulin syringes, disposable, U-100 0.3 ML MISC Inject 10 units twice daily on infusion days, 5 units twice daily all other days 1 each 3     LORazepam (ATIVAN) 0.5 MG tablet Take 1 tablet (0.5 mg) by mouth every 4 hours as needed (Anxiety, Nausea/Vomiting or Sleep) 30 tablet 2     magic mouthwash (FIRST-MOUTHWASH BLM) suspension Swish and swallow 5-10 mLs in mouth every 6 hours as needed for mouth sores 237 mL 1     nystatin (MYCOSTATIN) 978838 UNIT/ML suspension Take 5 mLs (500,000 Units) by mouth 4 times daily Swish and swallow 4 times daily 280 mL 1     ondansetron (ZOFRAN) 8 MG tablet Take 1 tablet (8 mg) by mouth every 8 hours as needed for nausea 30 tablet 0     ONETOUCH DELICA LANCETS 33G MISC 4 lancets daily 100 each 3     pantoprazole (PROTONIX) 40 MG EC tablet Take 1 tablet (40 mg) by mouth every morning 90 tablet 1     polyethylene glycol (MIRALAX/GLYCOLAX) Packet Take 17 g by mouth daily 30 packet 0     prochlorperazine (COMPAZINE) 10 MG tablet Take 1 tablet (10 mg) by mouth every 6 hours as needed (Nausea/Vomiting) 30 tablet 2     Social History:  Social History   Substance Use Topics     Smoking status: Former Smoker     Packs/day: 0.50     Years: 4.00     Types: Cigarettes     Start date: 1/1/1974     Quit date: 1981     Smokeless tobacco: Never Used     Alcohol use No      Comment: Now quit since Oct 31.  Moderate drinker in past     Review of Systems:   GENERAL: Was sick last week with chills and fever, which has improved. Appetite is okay, but was affected last week by illness.   SKIN: Negative  HENT: Negative   EYE: Blurry vision which has progressed despite improvement in blood sugars.  HEART: Negative  RESPIRATORY: Negative   GI:She has some nausea.   : Negative  MSK: Negative  BLOOD/LYMPH:  "Negative  NEUROLOGIC: Tingling in her toes.   PSYCH: Negative    Physical Examination:  Blood pressure 102/63, pulse 75, height 5' 5\" (1.651 m), weight 157 lb (71.2 kg), not currently breastfeeding.  Body mass index is 26.13 kg/(m^2).    Wt Readings from Last 4 Encounters:   07/09/18 157 lb (71.2 kg)   07/09/18 157 lb 11.2 oz (71.5 kg)   07/02/18 151 lb 12.8 oz (68.9 kg)   06/26/18 153 lb 1.6 oz (69.4 kg)     General: Well appearing woman in no distress.   Eyes: EOMI. Sclerae and conjunctivae are clear.    HENT: No thyromegaly or mass.    Lymphatic: No cervical lymphadenopathy.  Cardiovascular: RRR, with normal S1+S2 and no murmurs.   Respiratory: Lungs are clear to auscultation.   Gastrointestinal: Abdomen is soft, non tender, and non-distended.   Extremities: No peripheral edema. Feet are warm and well perfused. No open lesions or ulcers.   Neurologic: Sensation to light touch is intact in the feet bilaterally.     Labs and Studies:   Component      Latest Ref Rng & Units 5/30/2018   Hemoglobin A1C      0 - 5.6 % 7.8 (H)     Component      Latest Ref Rng & Units 7/2/2018   Creatinine      0.52 - 1.04 mg/dL 0.62       Assessment:  1. Type 2 diabetes mellitus, with improved glycemic control since starting NPH insulin.   2. Steroid induced hyperglycemia on infusion days, when she receives IV dexamethasone.   3. Pancreatic adenocarcinoma s/p resection and now undergoing treatment with chemotherapy.   4. History of necrotizing pancreatitis during treatment for above.      Plan:   - On infusion days, increase NPH insulin dose to 12 units in the morning and 12 units with dinner.  - Continue insulin doses on other days the same.   - Send blood sugars to me or to Yeni Mota between appointments for review and insulin dose adjustment. Next week will be an off week (no infusion) so she can send in 2 weeks after her next infusion day.   - Follow up in clinic in 3 months with Latricia Bustillos and in 6 months with me.   - There " was an issue with coverage for NPH pens that is now resolved. She will use up her current supply of NPH vials, and will contact us when she is ready for a refill for a pen prescription. She already has pen needles at home.     Es Gallo MD   Diabetes and Endocrinology   [phone]

## 2018-07-16 ENCOUNTER — APPOINTMENT (OUTPATIENT)
Dept: LAB | Facility: CLINIC | Age: 58
End: 2018-07-16
Attending: INTERNAL MEDICINE
Payer: COMMERCIAL

## 2018-07-16 ENCOUNTER — ONCOLOGY VISIT (OUTPATIENT)
Dept: ONCOLOGY | Facility: CLINIC | Age: 58
End: 2018-07-16
Attending: PHYSICIAN ASSISTANT
Payer: COMMERCIAL

## 2018-07-16 ENCOUNTER — TELEPHONE (OUTPATIENT)
Dept: ONCOLOGY | Facility: CLINIC | Age: 58
End: 2018-07-16

## 2018-07-16 VITALS
TEMPERATURE: 98.2 F | SYSTOLIC BLOOD PRESSURE: 92 MMHG | OXYGEN SATURATION: 98 % | DIASTOLIC BLOOD PRESSURE: 63 MMHG | HEART RATE: 73 BPM | BODY MASS INDEX: 25.06 KG/M2 | WEIGHT: 150.6 LBS

## 2018-07-16 DIAGNOSIS — C25.9 PANCREATIC ADENOCARCINOMA (H): Primary | ICD-10-CM

## 2018-07-16 DIAGNOSIS — K12.31 MUCOSITIS DUE TO CHEMOTHERAPY: ICD-10-CM

## 2018-07-16 LAB
ALBUMIN SERPL-MCNC: 3.4 G/DL (ref 3.4–5)
ALP SERPL-CCNC: 107 U/L (ref 40–150)
ALT SERPL W P-5'-P-CCNC: 17 U/L (ref 0–50)
ANION GAP SERPL CALCULATED.3IONS-SCNC: 6 MMOL/L (ref 3–14)
AST SERPL W P-5'-P-CCNC: 17 U/L (ref 0–45)
BASOPHILS # BLD AUTO: 0 10E9/L (ref 0–0.2)
BASOPHILS NFR BLD AUTO: 0.2 %
BILIRUB SERPL-MCNC: 0.4 MG/DL (ref 0.2–1.3)
BUN SERPL-MCNC: 12 MG/DL (ref 7–30)
CALCIUM SERPL-MCNC: 8.8 MG/DL (ref 8.5–10.1)
CHLORIDE SERPL-SCNC: 106 MMOL/L (ref 94–109)
CO2 SERPL-SCNC: 27 MMOL/L (ref 20–32)
CREAT SERPL-MCNC: 0.57 MG/DL (ref 0.52–1.04)
DIFFERENTIAL METHOD BLD: ABNORMAL
EOSINOPHIL # BLD AUTO: 0 10E9/L (ref 0–0.7)
EOSINOPHIL NFR BLD AUTO: 0.7 %
ERYTHROCYTE [DISTWIDTH] IN BLOOD BY AUTOMATED COUNT: 14.9 % (ref 10–15)
GFR SERPL CREATININE-BSD FRML MDRD: >90 ML/MIN/1.7M2
GLUCOSE SERPL-MCNC: 117 MG/DL (ref 70–99)
HCT VFR BLD AUTO: 35.2 % (ref 35–47)
HGB BLD-MCNC: 11.8 G/DL (ref 11.7–15.7)
IMM GRANULOCYTES # BLD: 0 10E9/L (ref 0–0.4)
IMM GRANULOCYTES NFR BLD: 0.3 %
LYMPHOCYTES # BLD AUTO: 2.8 10E9/L (ref 0.8–5.3)
LYMPHOCYTES NFR BLD AUTO: 46.9 %
MAGNESIUM SERPL-MCNC: 2.3 MG/DL (ref 1.6–2.3)
MCH RBC QN AUTO: 30.6 PG (ref 26.5–33)
MCHC RBC AUTO-ENTMCNC: 33.5 G/DL (ref 31.5–36.5)
MCV RBC AUTO: 91 FL (ref 78–100)
MONOCYTES # BLD AUTO: 0.5 10E9/L (ref 0–1.3)
MONOCYTES NFR BLD AUTO: 8.7 %
NEUTROPHILS # BLD AUTO: 2.5 10E9/L (ref 1.6–8.3)
NEUTROPHILS NFR BLD AUTO: 43.2 %
NRBC # BLD AUTO: 0.1 10*3/UL
NRBC BLD AUTO-RTO: 1 /100
PHOSPHATE SERPL-MCNC: 3.7 MG/DL (ref 2.5–4.5)
PLATELET # BLD AUTO: 160 10E9/L (ref 150–450)
POTASSIUM SERPL-SCNC: 4 MMOL/L (ref 3.4–5.3)
PROT SERPL-MCNC: 7.3 G/DL (ref 6.8–8.8)
RBC # BLD AUTO: 3.86 10E12/L (ref 3.8–5.2)
SODIUM SERPL-SCNC: 139 MMOL/L (ref 133–144)
WBC # BLD AUTO: 5.9 10E9/L (ref 4–11)

## 2018-07-16 PROCEDURE — 25000128 H RX IP 250 OP 636: Mod: ZF | Performed by: PHYSICIAN ASSISTANT

## 2018-07-16 PROCEDURE — G0463 HOSPITAL OUTPT CLINIC VISIT: HCPCS | Mod: ZF

## 2018-07-16 PROCEDURE — 83735 ASSAY OF MAGNESIUM: CPT | Performed by: PHYSICIAN ASSISTANT

## 2018-07-16 PROCEDURE — 85025 COMPLETE CBC W/AUTO DIFF WBC: CPT | Performed by: PHYSICIAN ASSISTANT

## 2018-07-16 PROCEDURE — 80053 COMPREHEN METABOLIC PANEL: CPT | Performed by: PHYSICIAN ASSISTANT

## 2018-07-16 PROCEDURE — 99214 OFFICE O/P EST MOD 30 MIN: CPT | Mod: ZP | Performed by: PHYSICIAN ASSISTANT

## 2018-07-16 PROCEDURE — 84100 ASSAY OF PHOSPHORUS: CPT | Performed by: PHYSICIAN ASSISTANT

## 2018-07-16 RX ORDER — HEPARIN SODIUM (PORCINE) LOCK FLUSH IV SOLN 100 UNIT/ML 100 UNIT/ML
5 SOLUTION INTRAVENOUS EVERY 8 HOURS
Status: DISCONTINUED | OUTPATIENT
Start: 2018-07-16 | End: 2018-07-16 | Stop reason: HOSPADM

## 2018-07-16 RX ORDER — OXYCODONE HCL 5 MG/5 ML
5-10 SOLUTION, ORAL ORAL EVERY 6 HOURS PRN
Qty: 473 ML | Refills: 0 | Status: SHIPPED | OUTPATIENT
Start: 2018-07-16 | End: 2018-10-09

## 2018-07-16 RX ADMIN — SODIUM CHLORIDE, PRESERVATIVE FREE 5 ML: 5 INJECTION INTRAVENOUS at 13:14

## 2018-07-16 ASSESSMENT — PAIN SCALES - GENERAL: PAINLEVEL: WORST PAIN (10)

## 2018-07-16 NOTE — TELEPHONE ENCOUNTER
Pt has been on MMW and Nystatin for thrush since 7/11/18. She uses MMW prior to eating, nystatin after eating, also salt and soda swishes.  Her mouth symptoms have worsened. spouse states she is unable to eat or swallow well. She whispers or writes her communication with him.  She chokes on her saliva. She has sores on her lips. The salt and soda swishes burn her lips and mouth.  Last day of this Xeloda cycle was yesterday.  No fevers, nausea or vomiting.  She had 2-4 loose or watery stools past 2 days. Spoke to Kellie who would like pt seen in clinic today with labs. appt scheduled and pt notified.

## 2018-07-16 NOTE — MR AVS SNAPSHOT
After Visit Summary   7/16/2018    Kitty Bangura    MRN: [id number]           Patient Information     Date Of Birth          1960        Visit Information        Provider Department      7/16/2018 1:10 PM Iwona Galan PA [facility]        Today's Diagnoses     Pancreatic adenocarcinoma (H)    -  1    Mucositis due to chemotherapy           Follow-ups after your visit        Your next 10 appointments already scheduled     Jul 23, 2018  8:15 AM CDT   [facility] Draw with [facility] LAB DRAW   [facility] ([facility])    [address] [place] [address]  [address]  [facility] [address]   [phone]            Jul 23, 2018  8:40 AM CDT   (Arrive by 8:25 AM)   Return Visit with Kellie Nobles PA-C   [facility] ([facility])    [address] [place] [address]  [address]  [facility] [address]   [phone]            Jul 23, 2018  9:30 AM CDT   Infusion 60 with UC ONCOLOGY INFUSION, UC 22 ATC   [facility] ([facility])    [address] [place] [address]  [address]  [facility] [address]   [phone]            Jul 30, 2018  8:45 AM CDT   [facility] Lab Draw with [facility] MAS[facility] LAB DRAW   [facility] Draw ([facility])    [address] [place] [address]  [address]  [facility] [address]   [phone]            Jul 30, 2018  9:30 AM CDT   Infusion 60 with UC ONCOLOGY INFUSION, UC 21 ATC   [facility] ([facility])    [address] [place] [address]  [address]  [facility] [address]   [phone]            Aug 06, 2018  8:30 AM CDT   [facility] Lab Draw with UC MASONIC LAB DRAW   [facility] Draw ([facility])    [address] [place] [address]  [address]  [facility] [address]   [phone]            Aug 06, 2018  9:00 AM CDT   Infusion 60 with UC ONCOLOGY INFUSION, UC 25 ATC    [facility] ([facility])    [address] [facility]  [address]  [facility] [address] [phone]              Who to contact     If you have questions or need follow up information about today's clinic visit or your schedule please contact [facility] directly at [phone].  Normal or non-critical lab and imaging results will be communicated to you by MyChart, letter or phone within 4 business days after the clinic has received the results. If you do not hear from us within 7 days, please contact the clinic through [facility] or phone. If you have a critical or abnormal lab result, we will notify you by phone as soon as possible.  Submit refill requests through [facility] or call your pharmacy and they will forward the refill request to us. Please allow 3 business days for your refill to be completed.          Additional Information About Your Visit        [facility] Information     [facility] gives you secure access to your electronic health record. If you see a primary care provider, you can also send messages to your care team and make appointments. If you have questions, please call your primary care clinic.  If you do not have a primary care provider, please call [phone] and they will assist you.        Care EveryWhere ID     This is your Care EveryWhere ID. This could be used by other organizations to access your [facility] medical records  [id number]        Your Vitals Were     Pulse Temperature Pulse Oximetry BMI (Body Mass Index)          73 98.2  F (36.8  C) (Axillary) 98% 25.06 kg/m2         Blood Pressure from Last 3 Encounters:   07/16/18 92/63   07/09/18 102/63   07/09/18 102/63    Weight from Last 3 Encounters:   07/16/18 68.3 kg (150 lb 9.6 oz)   07/09/18 71.2 kg (157 lb)   07/09/18 71.5 kg (157 lb 11.2 oz)              We Performed the Following     *CBC with platelets differential     Comprehensive metabolic panel     Magnesium      Phosphorus          Today's Medication Changes          These changes are accurate as of 7/16/18  2:00 PM.  If you have any questions, ask your nurse or doctor.               Start taking these medicines.        Dose/Directions    lidocaine (viscous) 2 % solution   Commonly known as:  XYLOCAINE   Used for:  Mucositis due to chemotherapy, Pancreatic adenocarcinoma (H)   Started by:  Iwona Galan PA        Dose:  15 mL   Take 15 mLs by mouth every 2 hours as needed for moderate pain swish and spit every 3-8 hours as needed; max 8 doses/24 hour period   Quantity:  100 mL   Refills:  1       oxyCODONE 5 MG/5ML solution   Commonly known as:  ROXICODONE   Used for:  Mucositis due to chemotherapy, Pancreatic adenocarcinoma (H)   Started by:  Iwona Galan PA        Dose:  5-10 mg   Take 5-10 mLs (5-10 mg) by mouth every 6 hours as needed for severe pain   Quantity:  473 mL   Refills:  0            Where to get your medicines      These medications were sent to [address] [address]    Hours:  TRANSPLANT PHONE NUMBER [phone] Phone:  [phone]     lidocaine (viscous) 2 % solution         Some of these will need a paper prescription and others can be bought over the counter.  Ask your nurse if you have questions.     Bring a paper prescription for each of these medications     oxyCODONE 5 MG/5ML solution               Information about OPIOIDS     PRESCRIPTION OPIOIDS: WHAT YOU NEED TO KNOW   We gave you an opioid (narcotic) pain medicine. It is important to manage your pain, but opioids are not always the best choice. You should first try all the other options your care team gave you. Take this medicine for as short a time (and as few doses) as possible.     These medicines have risks:    DO NOT drive when on new or higher doses of pain medicine. These medicines can affect your alertness and  reaction times, and you could be arrested for driving under the influence (DUI). If you need to use opioids long-term, talk to your care team about driving.    DO NOT operate heave machinery    DO NOT do any other dangerous activities while taking these medicines.     DO NOT drink any alcohol while taking these medicines.      If the opioid prescribed includes acetaminophen, DO NOT take with any other medicines that contain acetaminophen. Read all labels carefully. Look for the word  acetaminophen  or  Tylenol.  Ask your pharmacist if you have questions or are unsure.    You can get addicted to pain medicines, especially if you have a history of addiction (chemical, alcohol or substance dependence). Talk to your care team about ways to reduce this risk.    Store your pills in a secure place, locked if possible. We will not replace any lost or stolen medicine. If you don t finish your medicine, please throw away (dispose) as directed by your pharmacist. The [place] Pollution Control Agency has more information about safe disposal: https://www.pca.[facility].[place].us/living-green/managing-unwanted-medications.     All opioids tend to cause constipation. Drink plenty of water and eat foods that have a lot of fiber, such as fruits, vegetables, prune juice, apple juice and high-fiber cereal. Take a laxative (Miralax, milk of magnesia, Colace, Senna) if you don t move your bowels at least every other day.          Primary Care Provider Office Phone # Fax #    Solange Mcgill [phone] [address] [facility] [address]        Equal Access to Services     CHASE DE GZUMAN : Hadii zaheer guerrero hadasho Soomaali, waaxda luqadaha, qaybta kaalmada adeegyada, re gerardo . So [facility] [phone].    ATENCIÓN: Si habla español, tiene a quiros disposición servicios gratuitos de asistencia lingüística. Llame al [phone].    We comply with applicable federal civil rights laws and  [place] laws. We do not discriminate on the basis of race, color, national origin, age, disability, sex, sexual orientation, or gender identity.            Thank you!     Thank you for choosing [facility]  for your care. Our goal is always to provide you with excellent care. Hearing back from our patients is one way we can continue to improve our services. Please take a few minutes to complete the written survey that you may receive in the mail after your visit with us. Thank you!             Your Updated Medication List - Protect others around you: Learn how to safely use, store and throw away your medicines at www.disposemymeds.org.          This list is accurate as of 7/16/18  2:00 PM.  Always use your most recent med list.                   Brand Name Dispense Instructions for use Diagnosis    acetaminophen 500 MG tablet    TYLENOL    100 tablet    Take 2 tablets (1,000 mg) by mouth every 8 hours    Acute post-operative pain       amylase-lipase-protease 41964-28286 units Cpep per EC capsule    CREON    450 capsule    Take 2-3 with meals / 1-2 with snacks, up to 15 per day.    Necrotizing pancreatitis       blood glucose monitoring test strip    ONETOUCH VERIO IQ    100 each    Use to test blood sugar 4 times daily or as directed.    Type 2 diabetes mellitus with diabetic polyneuropathy, with long-term current use of insulin (H)       capecitabine 500 MG tablet CHEMO    XELODA    126 tablet    Take 3 tablets (1,500 mg) by mouth 2 times daily for 21 days Days 1 through 21, then 7 days off. Take within 30 mins after a meal.    Pancreatic adenocarcinoma (H)       docusate sodium 100 MG capsule    COLACE    60 capsule    Take 1 capsule (100 mg) by mouth daily    Other constipation       insulin  UNIT/ML injection    HumuLIN N/NovoLIN N    3 vial    Inject 10 units twice daily on infusion days, 5 units twice daily on all other days.    Type 2 diabetes mellitus with diabetic  polyneuropathy, with long-term current use of insulin (H)       insulin pen needle 32G X 4 MM    BD DOTTY U/F    100 each    Use 2 daily as directed.    Hyperglycemia, Malignant neoplasm of pancreas, unspecified location of malignancy (H)       insulin syringes (disposable) U-100 0.3 ML Misc     1 each    Inject 10 units twice daily on infusion days, 5 units twice daily all other days    Type 2 diabetes mellitus with diabetic polyneuropathy, with long-term current use of insulin (H)       lidocaine (viscous) 2 % solution    XYLOCAINE    100 mL    Take 15 mLs by mouth every 2 hours as needed for moderate pain swish and spit every 3-8 hours as needed; max 8 doses/24 hour period    Mucositis due to chemotherapy, Pancreatic adenocarcinoma (H)       LORazepam 0.5 MG tablet    ATIVAN    30 tablet    Take 1 tablet (0.5 mg) by mouth every 4 hours as needed (Anxiety, Nausea/Vomiting or Sleep)    Pancreatic adenocarcinoma (H)       magic mouthwash suspension (diphenhydrAMINE, lidocaine, aluminum-magnesium & simethicone) Susp compounding kit     237 mL    Swish and swallow 5-10 mLs in mouth every 6 hours as needed for mouth sores    Mouth sores       nystatin 916756 UNIT/ML suspension    MYCOSTATIN    280 mL    Take 5 mLs (500,000 Units) by mouth 4 times daily Swish and swallow 4 times daily    Mouth sores       ondansetron 8 MG tablet    ZOFRAN    30 tablet    Take 1 tablet (8 mg) by mouth every 8 hours as needed for nausea    Pancreatic adenocarcinoma (H)       ONETOUCH DELICA LANCETS 33G Misc     100 each    4 lancets daily    Type 2 diabetes mellitus with diabetic polyneuropathy, with long-term current use of insulin (H)       oxyCODONE 5 MG/5ML solution    ROXICODONE    473 mL    Take 5-10 mLs (5-10 mg) by mouth every 6 hours as needed for severe pain    Mucositis due to chemotherapy, Pancreatic adenocarcinoma (H)       pantoprazole 40 MG EC tablet    PROTONIX    90 tablet    Take 1 tablet (40 mg) by mouth every morning     Pancreatic adenocarcinoma (H)       polyethylene glycol Packet    MIRALAX/GLYCOLAX    30 packet    Take 17 g by mouth daily    Drug-induced constipation       prochlorperazine 10 MG tablet    COMPAZINE    30 tablet    Take 1 tablet (10 mg) by mouth every 6 hours as needed (Nausea/Vomiting)    Pancreatic adenocarcinoma (H)

## 2018-07-16 NOTE — PROGRESS NOTES
Oncology/Hematology Visit Note  Jul 16, 2018    Reason for Visit: add on visit for mouth sores    History of Present Illness: Kitty Bangura is a 57 year old female, previously healthy until she presented in early November 2017 with abdominal pain. CT abdomen on 11/1/17 showed acute pancreatitis (lipase 41,960) and a 3cm heterogenous pancreatic tail mass. The etiology of pancreatitis is unclear - no obstructing gallstone and not a heavy drinker. She was hospitalized for one week and followed up with Dr. González Metz in GI clinic.      11/29/17 -- endoscopic drainage of walled-off area of pancreatic necrosis by Dr. Metz. During the same procedure she had an EUS FNA of the pancreatic tail mass and pathology showed adenocarcinoma. The mass measured 33 x 27 mm, encapsulated with solid and cystic components, rim calcification, and no evidence of invasion.      12/9/17 -- Staging CT chest/abd/pelvis showed several small pulmonary nodules (largest 3mm), unchanged mass in the pancreatic tail, mildly prominent mesenteric nodes thought likely reactive, and small right hepatic lobe hypodensities. Abdominal MRI on 12/10/17 showed hepatic hemangiomas, no evidence of metastatic disease to the liver.   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      Kitty met with Dr Monk and discussed neoadjuvant chemotherapy with FOLFIRINOX, then surgery and then adjuvant chemotherapy. She was seen on 1/15/18 for cycle 1 and then hospitalized for pancreatitis on 1/20/18 where she was managed with IV fluds and pain control. GI took her to the OR to attempt pancreatic duct stent placement but unfortunately it was completely obstructed. They were able to place a pancreatic stent and perform an EUS cyst-gastrostomy with plans to repeat Xray a month following to ensure stent passage. She was d/c 1/27. Cycle 2 was given 2/5/18 (a week delayed due  to hospitalization).      She went to ED on 2/11 with worsening abdominal pain and was worried about pancreatitis. W/u with CT and labs including lipase were negative. She had elevated WBC of 33K but had Neulasta on 2/8.      Cycle 3 was given on 2/20 with dose-reduction (10%) of oxaliplatin due to significant cold sensitivity and motor neuropathy. Complicated by bronchitis.     Cycle 4 was given 3/8/18. She went on to have distal pancreatotomy and splenectomy and cholecystectomy on 4/17/18. Final pathology showed complete pathologic response, 26 benign lymph nodes, and necrotizing pancreatitis.      She started adjuvant chemotherapy with Xeloda and gemcitabine 5/30/18. She had baseline CT on 5/22/18 showing no evidence of recurrent disease. Please see previous notes for further details on the patient's history.     Interval History:  Mouth sores started right before July 4th. Has a one in back of mouth on left and right and some in throat. Lips had blistering. She has been applying nystatin to lips and using MMW. Not able to eat even with MMW. Feels like she is gagging on saliva. Pain radiating to right ear from right side of jaw. She took oxycodone prior to eating and 2 during the night to be able to sleep. Yesterday able to take some fluids, but today had difficulty with solids and liquids. She was unable to take her last dose of Xeloda last night. Today would start her week off. She had temp of 100 on July 4th, but nothing since then. She started having watery stools about 3-4 days ago so she quit taking stool softener. Now having loose stools, less frequent--about 3-4 over the past 3 days and only 1 today. Voiding without difficulty. Feels a little lightheaded when standing today, but no syncope. Denies fever, chills, nausea. No rash on hands/feet.    Review of Systems:  Patient denies any of the following except if noted above: chest pain, dyspnea, occasional cough.    Current Outpatient Prescriptions    Medication Sig Dispense Refill     acetaminophen (TYLENOL) 500 MG tablet Take 2 tablets (1,000 mg) by mouth every 8 hours 100 tablet 1     amylase-lipase-protease (CREON) 98956-80596 UNITS CPEP per EC capsule Take 2-3 with meals / 1-2 with snacks, up to 15 per day. 450 capsule 6     blood glucose monitoring (ONETOUCH VERIO IQ) test strip Use to test blood sugar 4 times daily or as directed. 100 each 3     capecitabine (XELODA) 500 MG tablet CHEMO Take 3 tablets (1,500 mg) by mouth 2 times daily for 21 days Days 1 through 21, then 7 days off. Take within 30 mins after a meal. 126 tablet 0     docusate sodium (COLACE) 100 MG capsule Take 1 capsule (100 mg) by mouth daily 60 capsule 1     insulin NPH (HUMULIN N/NOVOLIN N) 100 UNIT/ML injection Inject 10 units twice daily on infusion days, 5 units twice daily on all other days. 3 vial 3     insulin pen needle (BD DOTTY U/F) 32G X 4 MM Use 2 daily as directed. 100 each 11     insulin syringes, disposable, U-100 0.3 ML MISC Inject 10 units twice daily on infusion days, 5 units twice daily all other days 1 each 3     LORazepam (ATIVAN) 0.5 MG tablet Take 1 tablet (0.5 mg) by mouth every 4 hours as needed (Anxiety, Nausea/Vomiting or Sleep) 30 tablet 2     magic mouthwash (FIRST-MOUTHWASH BLM) suspension Swish and swallow 5-10 mLs in mouth every 6 hours as needed for mouth sores 237 mL 1     nystatin (MYCOSTATIN) 878784 UNIT/ML suspension Take 5 mLs (500,000 Units) by mouth 4 times daily Swish and swallow 4 times daily 280 mL 1     ondansetron (ZOFRAN) 8 MG tablet Take 1 tablet (8 mg) by mouth every 8 hours as needed for nausea 30 tablet 0     ONETOUCH DELICA LANCETS 33G MISC 4 lancets daily 100 each 3     pantoprazole (PROTONIX) 40 MG EC tablet Take 1 tablet (40 mg) by mouth every morning 90 tablet 1     polyethylene glycol (MIRALAX/GLYCOLAX) Packet Take 17 g by mouth daily 30 packet 0     prochlorperazine (COMPAZINE) 10 MG tablet Take 1 tablet (10 mg) by mouth every 6  hours as needed (Nausea/Vomiting) 30 tablet 2       Physical Examination:  General: The patient is a pleasant female in no acute distress.  BP 92/63 (BP Location: Right arm, Patient Position: Sitting)  Pulse 73  Temp 98.2  F (36.8  C) (Axillary)  Wt 68.3 kg (150 lb 9.6 oz)  SpO2 98%  BMI 25.06 kg/m2  Wt Readings from Last 10 Encounters:   07/16/18 68.3 kg (150 lb 9.6 oz)   07/09/18 71.2 kg (157 lb)   07/09/18 71.5 kg (157 lb 11.2 oz)   07/02/18 68.9 kg (151 lb 12.8 oz)   06/26/18 69.4 kg (153 lb 1.6 oz)   06/12/18 67.1 kg (147 lb 14.4 oz)   06/05/18 67.7 kg (149 lb 4 oz)   06/05/18 67.7 kg (149 lb 3.2 oz)   05/30/18 68.9 kg (152 lb)   05/14/18 68.7 kg (151 lb 8 oz)     HEENT: EOMI, PERRL. Sclerae are anicteric. Oral mucosa is pink and moist. Small ulcer at left and right base of tongue. No other lesions. No thrush.  Lymph: Neck is supple with no lymphadenopathy in the cervical or supraclavicular areas.   Heart: Regular rate and rhythm.   Lungs: Clear to auscultation bilaterally.   Abdomen: Bowel sounds present, soft. Mild tenderness in RLQ. No rebound or guarding. No palpable hepatosplenomegaly or masses.   Extremities: No lower extremity edema noted bilaterally.   Neuro: Cranial nerves II through XII are grossly intact.  Skin: No rashes, petechiae, or bruising noted on exposed skin.    Laboratory Data:  Results for orders placed or performed in visit on 07/16/18 (from the past 24 hour(s))   *CBC with platelets differential   Result Value Ref Range    WBC 5.9 4.0 - 11.0 10e9/L    RBC Count 3.86 3.8 - 5.2 10e12/L    Hemoglobin 11.8 11.7 - 15.7 g/dL    Hematocrit 35.2 35.0 - 47.0 %    MCV 91 78 - 100 fl    MCH 30.6 26.5 - 33.0 pg    MCHC 33.5 31.5 - 36.5 g/dL    RDW 14.9 10.0 - 15.0 %    Platelet Count 160 150 - 450 10e9/L    Diff Method Automated Method     % Neutrophils 43.2 %    % Lymphocytes 46.9 %    % Monocytes 8.7 %    % Eosinophils 0.7 %    % Basophils 0.2 %    % Immature Granulocytes 0.3 %    Nucleated  RBCs 1 (H) 0 /100    Absolute Neutrophil 2.5 1.6 - 8.3 10e9/L    Absolute Lymphocytes 2.8 0.8 - 5.3 10e9/L    Absolute Monocytes 0.5 0.0 - 1.3 10e9/L    Absolute Eosinophils 0.0 0.0 - 0.7 10e9/L    Absolute Basophils 0.0 0.0 - 0.2 10e9/L    Abs Immature Granulocytes 0.0 0 - 0.4 10e9/L    Absolute Nucleated RBC 0.1          Assessment and Plan:    Musocitis: likely 2/2 to chemotherapy (Xeloda). She has been using salt/soda rinses, MMW and nystatin. Took remaining tablets of oxycodone yesterday as pain was limiting oral intake. Today she states unable to tolerate solids or liquids. Afebrile. WBC and ANC normal. She was unable to take last dose of Xeloda last night and is starting 7 off today  --Will send rx for oxycodone soln 5-10 mLs q6h prn. Take at least 30 minutes prior to eating  --Will also try viscous lidocaine  --Continue nystatin  --Call if unable to tolerate eating/drinking with these measures, fever, dizziness, etc.  --Follow up in clinic on 7/23 as scheduled.    Pancreatic cancer  S/p 4 cycles of neoadjuvant  FOLFIRINOX with good response so she was able to undergo distal pancreatectomy and splenectomy. Her pathology showed complete pathologic response. She started adjuvant gem/xeloda and started cycle 2 on 6/26. She is starting her 7 days off of Xeloda today.  --Follow up with labs, heather Talavera on 7/23.     Iwona Galan PA-C  [facility]  [address] [place] [address] [phone]

## 2018-07-16 NOTE — LETTER
7/16/2018      RE: Kitty Bangura  [address] [facility] [address]       Oncology/Hematology Visit Note  Jul 16, 2018    Reason for Visit: add on visit for mouth sores    History of Present Illness: Kitty Bangura is a 57 year old female, previously healthy until she presented in early November 2017 with abdominal pain. CT abdomen on 11/1/17 showed acute pancreatitis (lipase 41,960) and a 3cm heterogenous pancreatic tail mass. The etiology of pancreatitis is unclear - no obstructing gallstone and not a heavy drinker. She was hospitalized for one week and followed up with Dr. González Metz in GI clinic.      11/29/17 -- endoscopic drainage of walled-off area of pancreatic necrosis by Dr. Metz. During the same procedure she had an EUS FNA of the pancreatic tail mass and pathology showed adenocarcinoma. The mass measured 33 x 27 mm, encapsulated with solid and cystic components, rim calcification, and no evidence of invasion.      12/9/17 -- Staging CT chest/abd/pelvis showed several small pulmonary nodules (largest 3mm), unchanged mass in the pancreatic tail, mildly prominent mesenteric nodes thought likely reactive, and small right hepatic lobe hypodensities. Abdominal MRI on 12/10/17 showed hepatic hemangiomas, no evidence of metastatic disease to the liver.   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      Kitty met with Dr Monk and discussed neoadjuvant chemotherapy with FOLFIRINOX, then surgery and then adjuvant chemotherapy. She was seen on 1/15/18 for cycle 1 and then hospitalized for pancreatitis on 1/20/18 where she was managed with IV fluds and pain control. GI took her to the OR to attempt pancreatic duct stent placement but unfortunately it was completely obstructed. They were able to place a pancreatic stent and perform an EUS cyst-gastrostomy with plans to repeat Xray a month following to  ensure stent passage. She was d/c 1/27. Cycle 2 was given 2/5/18 (a week delayed due to hospitalization).      She went to ED on 2/11 with worsening abdominal pain and was worried about pancreatitis. W/u with CT and labs including lipase were negative. She had elevated WBC of 33K but had Neulasta on 2/8.      Cycle 3 was given on 2/20 with dose-reduction (10%) of oxaliplatin due to significant cold sensitivity and motor neuropathy. Complicated by bronchitis.     Cycle 4 was given 3/8/18. She went on to have distal pancreatotomy and splenectomy and cholecystectomy on 4/17/18. Final pathology showed complete pathologic response, 26 benign lymph nodes, and necrotizing pancreatitis.      She started adjuvant chemotherapy with Xeloda and gemcitabine 5/30/18. She had baseline CT on 5/22/18 showing no evidence of recurrent disease. Please see previous notes for further details on the patient's history.     Interval History:  Mouth sores started right before July 4th. Has a one in back of mouth on left and right and some in throat. Lips had blistering. She has been applying nystatin to lips and using MMW. Not able to eat even with MMW. Feels like she is gagging on saliva. Pain radiating to right ear from right side of jaw. She took oxycodone prior to eating and 2 during the night to be able to sleep. Yesterday able to take some fluids, but today had difficulty with solids and liquids. She was unable to take her last dose of Xeloda last night. Today would start her week off. She had temp of 100 on July 4th, but nothing since then. She started having watery stools about 3-4 days ago so she quit taking stool softener. Now having loose stools, less frequent--about 3-4 over the past 3 days and only 1 today. Voiding without difficulty. Feels a little lightheaded when standing today, but no syncope. Denies fever, chills, nausea. No rash on hands/feet.    Review of Systems:  Patient denies any of the following except if noted  above: chest pain, dyspnea, occasional cough.    Current Outpatient Prescriptions   Medication Sig Dispense Refill     acetaminophen (TYLENOL) 500 MG tablet Take 2 tablets (1,000 mg) by mouth every 8 hours 100 tablet 1     amylase-lipase-protease (CREON) 89782-39218 UNITS CPEP per EC capsule Take 2-3 with meals / 1-2 with snacks, up to 15 per day. 450 capsule 6     blood glucose monitoring (ONETOUCH VERIO IQ) test strip Use to test blood sugar 4 times daily or as directed. 100 each 3     capecitabine (XELODA) 500 MG tablet CHEMO Take 3 tablets (1,500 mg) by mouth 2 times daily for 21 days Days 1 through 21, then 7 days off. Take within 30 mins after a meal. 126 tablet 0     docusate sodium (COLACE) 100 MG capsule Take 1 capsule (100 mg) by mouth daily 60 capsule 1     insulin NPH (HUMULIN N/NOVOLIN N) 100 UNIT/ML injection Inject 10 units twice daily on infusion days, 5 units twice daily on all other days. 3 vial 3     insulin pen needle (BD DOTTY U/F) 32G X 4 MM Use 2 daily as directed. 100 each 11     insulin syringes, disposable, U-100 0.3 ML MISC Inject 10 units twice daily on infusion days, 5 units twice daily all other days 1 each 3     LORazepam (ATIVAN) 0.5 MG tablet Take 1 tablet (0.5 mg) by mouth every 4 hours as needed (Anxiety, Nausea/Vomiting or Sleep) 30 tablet 2     magic mouthwash (FIRST-MOUTHWASH BLM) suspension Swish and swallow 5-10 mLs in mouth every 6 hours as needed for mouth sores 237 mL 1     nystatin (MYCOSTATIN) 837448 UNIT/ML suspension Take 5 mLs (500,000 Units) by mouth 4 times daily Swish and swallow 4 times daily 280 mL 1     ondansetron (ZOFRAN) 8 MG tablet Take 1 tablet (8 mg) by mouth every 8 hours as needed for nausea 30 tablet 0     ONETOUCH DELICA LANCETS 33G MISC 4 lancets daily 100 each 3     pantoprazole (PROTONIX) 40 MG EC tablet Take 1 tablet (40 mg) by mouth every morning 90 tablet 1     polyethylene glycol (MIRALAX/GLYCOLAX) Packet Take 17 g by mouth daily 30 packet 0      prochlorperazine (COMPAZINE) 10 MG tablet Take 1 tablet (10 mg) by mouth every 6 hours as needed (Nausea/Vomiting) 30 tablet 2       Physical Examination:  General: The patient is a pleasant female in no acute distress.  BP 92/63 (BP Location: Right arm, Patient Position: Sitting)  Pulse 73  Temp 98.2  F (36.8  C) (Axillary)  Wt 68.3 kg (150 lb 9.6 oz)  SpO2 98%  BMI 25.06 kg/m2  Wt Readings from Last 10 Encounters:   07/16/18 68.3 kg (150 lb 9.6 oz)   07/09/18 71.2 kg (157 lb)   07/09/18 71.5 kg (157 lb 11.2 oz)   07/02/18 68.9 kg (151 lb 12.8 oz)   06/26/18 69.4 kg (153 lb 1.6 oz)   06/12/18 67.1 kg (147 lb 14.4 oz)   06/05/18 67.7 kg (149 lb 4 oz)   06/05/18 67.7 kg (149 lb 3.2 oz)   05/30/18 68.9 kg (152 lb)   05/14/18 68.7 kg (151 lb 8 oz)     HEENT: EOMI, PERRL. Sclerae are anicteric. Oral mucosa is pink and moist. Small ulcer at left and right base of tongue. No other lesions. No thrush.  Lymph: Neck is supple with no lymphadenopathy in the cervical or supraclavicular areas.   Heart: Regular rate and rhythm.   Lungs: Clear to auscultation bilaterally.   Abdomen: Bowel sounds present, soft. Mild tenderness in RLQ. No rebound or guarding. No palpable hepatosplenomegaly or masses.   Extremities: No lower extremity edema noted bilaterally.   Neuro: Cranial nerves II through XII are grossly intact.  Skin: No rashes, petechiae, or bruising noted on exposed skin.    Laboratory Data:  Results for orders placed or performed in visit on 07/16/18 (from the past 24 hour(s))   *CBC with platelets differential   Result Value Ref Range    WBC 5.9 4.0 - 11.0 10e9/L    RBC Count 3.86 3.8 - 5.2 10e12/L    Hemoglobin 11.8 11.7 - 15.7 g/dL    Hematocrit 35.2 35.0 - 47.0 %    MCV 91 78 - 100 fl    MCH 30.6 26.5 - 33.0 pg    MCHC 33.5 31.5 - 36.5 g/dL    RDW 14.9 10.0 - 15.0 %    Platelet Count 160 150 - 450 10e9/L    Diff Method Automated Method     % Neutrophils 43.2 %    % Lymphocytes 46.9 %    % Monocytes 8.7 %    %  Eosinophils 0.7 %    % Basophils 0.2 %    % Immature Granulocytes 0.3 %    Nucleated RBCs 1 (H) 0 /100    Absolute Neutrophil 2.5 1.6 - 8.3 10e9/L    Absolute Lymphocytes 2.8 0.8 - 5.3 10e9/L    Absolute Monocytes 0.5 0.0 - 1.3 10e9/L    Absolute Eosinophils 0.0 0.0 - 0.7 10e9/L    Absolute Basophils 0.0 0.0 - 0.2 10e9/L    Abs Immature Granulocytes 0.0 0 - 0.4 10e9/L    Absolute Nucleated RBC 0.1          Assessment and Plan:    Musocitis: likely 2/2 to chemotherapy (Xeloda). She has been using salt/soda rinses, MMW and nystatin. Took remaining tablets of oxycodone yesterday as pain was limiting oral intake. Today she states unable to tolerate solids or liquids. Afebrile. WBC and ANC normal. She was unable to take last dose of Xeloda last night and is starting 7 off today  --Will send rx for oxycodone soln 5-10 mLs q6h prn. Take at least 30 minutes prior to eating  --Will also try viscous lidocaine  --Continue nystatin  --Call if unable to tolerate eating/drinking with these measures, fever, dizziness, etc.  --Follow up in clinic on 7/23 as scheduled.    Pancreatic cancer  S/p 4 cycles of neoadjuvant  FOLFIRINOX with good response so she was able to undergo distal pancreatectomy and splenectomy. Her pathology showed complete pathologic response. She started adjuvant gem/xeloda and started cycle 2 on 6/26. She is starting her 7 days off of Xeloda today.  --Follow up with labs, heather Talavera on 7/23.     Iwona Galan PA-C  [facility]  [address] [place] [address] [phone]      DANTE Alonzo

## 2018-07-16 NOTE — NURSING NOTE
"Oncology Rooming Note    July 16, 2018 1:23 PM   Kitty Bangura is a 57 year old female who presents for:    Chief Complaint   Patient presents with     Port Draw     Port labs collected by RN.      RECHECK     Pancreatic Cancer     Initial Vitals: BP 92/63 (BP Location: Right arm, Patient Position: Sitting)  Pulse 73  Temp 98.2  F (36.8  C) (Axillary)  Wt 68.3 kg (150 lb 9.6 oz)  SpO2 98%  BMI 25.06 kg/m2 Estimated body mass index is 25.06 kg/(m^2) as calculated from the following:    Height as of 7/9/18: 1.651 m (5' 5\").    Weight as of this encounter: 68.3 kg (150 lb 9.6 oz). Body surface area is 1.77 meters squared.  Worst Pain (10) Comment: Data Unavailable   No LMP recorded. Patient is postmenopausal.  Allergies reviewed: Yes  Medications reviewed: Yes    Medications: MEDICATION REFILLS NEEDED TODAY. Provider was notified.  Pharmacy name entered into [facility]:    [id number] - [place], [place] - [address] [place] [address] DR[facility] ST[id number] - [place], [place] - [address] AT [facility] OF [address] [place] [address]    Clinical concerns: Patient requesting Oxycodone Rx.     5 minutes for nursing intake (face to face time)     PAULINE SAGASTUME CMA              "

## 2018-07-23 ENCOUNTER — APPOINTMENT (OUTPATIENT)
Dept: LAB | Facility: CLINIC | Age: 58
End: 2018-07-23
Attending: INTERNAL MEDICINE
Payer: COMMERCIAL

## 2018-07-23 ENCOUNTER — ONCOLOGY VISIT (OUTPATIENT)
Dept: ONCOLOGY | Facility: CLINIC | Age: 58
End: 2018-07-23
Attending: INTERNAL MEDICINE
Payer: COMMERCIAL

## 2018-07-23 VITALS
BODY MASS INDEX: 25.27 KG/M2 | TEMPERATURE: 97.5 F | RESPIRATION RATE: 16 BRPM | WEIGHT: 151.7 LBS | HEART RATE: 74 BPM | HEIGHT: 65 IN | DIASTOLIC BLOOD PRESSURE: 62 MMHG | SYSTOLIC BLOOD PRESSURE: 92 MMHG | OXYGEN SATURATION: 97 %

## 2018-07-23 DIAGNOSIS — C25.9 PANCREATIC ADENOCARCINOMA (H): Primary | ICD-10-CM

## 2018-07-23 PROCEDURE — G0463 HOSPITAL OUTPT CLINIC VISIT: HCPCS | Mod: ZF

## 2018-07-23 PROCEDURE — 99214 OFFICE O/P EST MOD 30 MIN: CPT | Mod: ZP | Performed by: PHYSICIAN ASSISTANT

## 2018-07-23 PROCEDURE — 36415 COLL VENOUS BLD VENIPUNCTURE: CPT

## 2018-07-23 RX ORDER — ALBUTEROL SULFATE 90 UG/1
1-2 AEROSOL, METERED RESPIRATORY (INHALATION)
Status: CANCELLED
Start: 2018-08-06

## 2018-07-23 RX ORDER — METHYLPREDNISOLONE SODIUM SUCCINATE 125 MG/2ML
125 INJECTION, POWDER, LYOPHILIZED, FOR SOLUTION INTRAMUSCULAR; INTRAVENOUS
Status: CANCELLED
Start: 2018-08-13

## 2018-07-23 RX ORDER — EPINEPHRINE 1 MG/ML
0.3 INJECTION, SOLUTION INTRAMUSCULAR; SUBCUTANEOUS EVERY 5 MIN PRN
Status: CANCELLED | OUTPATIENT
Start: 2018-08-06

## 2018-07-23 RX ORDER — EPINEPHRINE 1 MG/ML
0.3 INJECTION, SOLUTION INTRAMUSCULAR; SUBCUTANEOUS EVERY 5 MIN PRN
Status: CANCELLED | OUTPATIENT
Start: 2018-07-23

## 2018-07-23 RX ORDER — MEPERIDINE HYDROCHLORIDE 25 MG/ML
25 INJECTION INTRAMUSCULAR; INTRAVENOUS; SUBCUTANEOUS EVERY 30 MIN PRN
Status: CANCELLED | OUTPATIENT
Start: 2018-07-23

## 2018-07-23 RX ORDER — MEPERIDINE HYDROCHLORIDE 25 MG/ML
25 INJECTION INTRAMUSCULAR; INTRAVENOUS; SUBCUTANEOUS EVERY 30 MIN PRN
Status: CANCELLED | OUTPATIENT
Start: 2018-08-13

## 2018-07-23 RX ORDER — METHYLPREDNISOLONE SODIUM SUCCINATE 125 MG/2ML
125 INJECTION, POWDER, LYOPHILIZED, FOR SOLUTION INTRAMUSCULAR; INTRAVENOUS
Status: CANCELLED
Start: 2018-07-23

## 2018-07-23 RX ORDER — SODIUM CHLORIDE 9 MG/ML
1000 INJECTION, SOLUTION INTRAVENOUS CONTINUOUS PRN
Status: CANCELLED
Start: 2018-07-30

## 2018-07-23 RX ORDER — METHYLPREDNISOLONE SODIUM SUCCINATE 125 MG/2ML
125 INJECTION, POWDER, LYOPHILIZED, FOR SOLUTION INTRAMUSCULAR; INTRAVENOUS
Status: CANCELLED
Start: 2018-08-06

## 2018-07-23 RX ORDER — ALBUTEROL SULFATE 0.83 MG/ML
2.5 SOLUTION RESPIRATORY (INHALATION)
Status: CANCELLED | OUTPATIENT
Start: 2018-07-23

## 2018-07-23 RX ORDER — HEPARIN SODIUM (PORCINE) LOCK FLUSH IV SOLN 100 UNIT/ML 100 UNIT/ML
5 SOLUTION INTRAVENOUS ONCE
Status: CANCELLED
Start: 2018-07-23 | End: 2018-07-23

## 2018-07-23 RX ORDER — ALBUTEROL SULFATE 0.83 MG/ML
2.5 SOLUTION RESPIRATORY (INHALATION)
Status: CANCELLED | OUTPATIENT
Start: 2018-08-06

## 2018-07-23 RX ORDER — METHYLPREDNISOLONE SODIUM SUCCINATE 125 MG/2ML
125 INJECTION, POWDER, LYOPHILIZED, FOR SOLUTION INTRAMUSCULAR; INTRAVENOUS
Status: CANCELLED
Start: 2018-07-30

## 2018-07-23 RX ORDER — HEPARIN SODIUM (PORCINE) LOCK FLUSH IV SOLN 100 UNIT/ML 100 UNIT/ML
5 SOLUTION INTRAVENOUS ONCE
Status: CANCELLED
Start: 2018-08-06 | End: 2018-07-30

## 2018-07-23 RX ORDER — ALBUTEROL SULFATE 0.83 MG/ML
2.5 SOLUTION RESPIRATORY (INHALATION)
Status: CANCELLED | OUTPATIENT
Start: 2018-07-30

## 2018-07-23 RX ORDER — HEPARIN SODIUM (PORCINE) LOCK FLUSH IV SOLN 100 UNIT/ML 100 UNIT/ML
5 SOLUTION INTRAVENOUS ONCE
Status: CANCELLED
Start: 2018-08-13 | End: 2018-08-06

## 2018-07-23 RX ORDER — LORAZEPAM 2 MG/ML
0.5 INJECTION INTRAMUSCULAR EVERY 4 HOURS PRN
Status: CANCELLED
Start: 2018-08-06

## 2018-07-23 RX ORDER — PEN NEEDLE, DIABETIC 29 G X1/2"
NEEDLE, DISPOSABLE MISCELLANEOUS
COMMUNITY
Start: 2018-07-19 | End: 2019-04-05

## 2018-07-23 RX ORDER — LORAZEPAM 2 MG/ML
0.5 INJECTION INTRAMUSCULAR EVERY 4 HOURS PRN
Status: CANCELLED
Start: 2018-08-13

## 2018-07-23 RX ORDER — DIPHENHYDRAMINE HYDROCHLORIDE 50 MG/ML
50 INJECTION INTRAMUSCULAR; INTRAVENOUS
Status: CANCELLED
Start: 2018-08-06

## 2018-07-23 RX ORDER — EPINEPHRINE 1 MG/ML
0.3 INJECTION, SOLUTION INTRAMUSCULAR; SUBCUTANEOUS EVERY 5 MIN PRN
Status: CANCELLED | OUTPATIENT
Start: 2018-08-13

## 2018-07-23 RX ORDER — DIPHENHYDRAMINE HYDROCHLORIDE 50 MG/ML
50 INJECTION INTRAMUSCULAR; INTRAVENOUS
Status: CANCELLED
Start: 2018-08-13

## 2018-07-23 RX ORDER — MEPERIDINE HYDROCHLORIDE 25 MG/ML
25 INJECTION INTRAMUSCULAR; INTRAVENOUS; SUBCUTANEOUS EVERY 30 MIN PRN
Status: CANCELLED | OUTPATIENT
Start: 2018-07-30

## 2018-07-23 RX ORDER — ALBUTEROL SULFATE 90 UG/1
1-2 AEROSOL, METERED RESPIRATORY (INHALATION)
Status: CANCELLED
Start: 2018-07-30

## 2018-07-23 RX ORDER — SODIUM CHLORIDE 9 MG/ML
1000 INJECTION, SOLUTION INTRAVENOUS CONTINUOUS PRN
Status: CANCELLED
Start: 2018-07-23

## 2018-07-23 RX ORDER — ALBUTEROL SULFATE 90 UG/1
1-2 AEROSOL, METERED RESPIRATORY (INHALATION)
Status: CANCELLED
Start: 2018-08-13

## 2018-07-23 RX ORDER — LORAZEPAM 2 MG/ML
0.5 INJECTION INTRAMUSCULAR EVERY 4 HOURS PRN
Status: CANCELLED
Start: 2018-07-30

## 2018-07-23 RX ORDER — ALBUTEROL SULFATE 0.83 MG/ML
2.5 SOLUTION RESPIRATORY (INHALATION)
Status: CANCELLED | OUTPATIENT
Start: 2018-08-13

## 2018-07-23 RX ORDER — HEPARIN SODIUM (PORCINE) LOCK FLUSH IV SOLN 100 UNIT/ML 100 UNIT/ML
5 SOLUTION INTRAVENOUS ONCE
Status: DISCONTINUED | OUTPATIENT
Start: 2018-07-23 | End: 2018-07-23 | Stop reason: CLARIF

## 2018-07-23 RX ORDER — EPINEPHRINE 0.3 MG/.3ML
0.3 INJECTION SUBCUTANEOUS EVERY 5 MIN PRN
Status: CANCELLED | OUTPATIENT
Start: 2018-07-23

## 2018-07-23 RX ORDER — EPINEPHRINE 0.3 MG/.3ML
0.3 INJECTION SUBCUTANEOUS EVERY 5 MIN PRN
Status: CANCELLED | OUTPATIENT
Start: 2018-07-30

## 2018-07-23 RX ORDER — EPINEPHRINE 0.3 MG/.3ML
0.3 INJECTION SUBCUTANEOUS EVERY 5 MIN PRN
Status: CANCELLED | OUTPATIENT
Start: 2018-08-06

## 2018-07-23 RX ORDER — DIPHENHYDRAMINE HYDROCHLORIDE 50 MG/ML
50 INJECTION INTRAMUSCULAR; INTRAVENOUS
Status: CANCELLED
Start: 2018-07-30

## 2018-07-23 RX ORDER — MEPERIDINE HYDROCHLORIDE 25 MG/ML
25 INJECTION INTRAMUSCULAR; INTRAVENOUS; SUBCUTANEOUS EVERY 30 MIN PRN
Status: CANCELLED | OUTPATIENT
Start: 2018-08-06

## 2018-07-23 RX ORDER — DIPHENHYDRAMINE HYDROCHLORIDE 50 MG/ML
50 INJECTION INTRAMUSCULAR; INTRAVENOUS
Status: CANCELLED
Start: 2018-07-23

## 2018-07-23 RX ORDER — LORAZEPAM 2 MG/ML
0.5 INJECTION INTRAMUSCULAR EVERY 4 HOURS PRN
Status: CANCELLED
Start: 2018-07-23

## 2018-07-23 RX ORDER — ALBUTEROL SULFATE 90 UG/1
1-2 AEROSOL, METERED RESPIRATORY (INHALATION)
Status: CANCELLED
Start: 2018-07-23

## 2018-07-23 RX ORDER — SODIUM CHLORIDE 9 MG/ML
1000 INJECTION, SOLUTION INTRAVENOUS CONTINUOUS PRN
Status: CANCELLED
Start: 2018-08-06

## 2018-07-23 RX ORDER — EPINEPHRINE 1 MG/ML
0.3 INJECTION, SOLUTION INTRAMUSCULAR; SUBCUTANEOUS EVERY 5 MIN PRN
Status: CANCELLED | OUTPATIENT
Start: 2018-07-30

## 2018-07-23 RX ORDER — HEPARIN SODIUM (PORCINE) LOCK FLUSH IV SOLN 100 UNIT/ML 100 UNIT/ML
5 SOLUTION INTRAVENOUS ONCE
Status: CANCELLED
Start: 2018-07-30 | End: 2018-07-30

## 2018-07-23 RX ORDER — EPINEPHRINE 0.3 MG/.3ML
0.3 INJECTION SUBCUTANEOUS EVERY 5 MIN PRN
Status: CANCELLED | OUTPATIENT
Start: 2018-08-13

## 2018-07-23 RX ORDER — SODIUM CHLORIDE 9 MG/ML
1000 INJECTION, SOLUTION INTRAVENOUS CONTINUOUS PRN
Status: CANCELLED
Start: 2018-08-13

## 2018-07-23 ASSESSMENT — PAIN SCALES - GENERAL: PAINLEVEL: EXTREME PAIN (8)

## 2018-07-23 NOTE — MR AVS SNAPSHOT
After Visit Summary   7/23/2018    Kitty Bangura    MRN: [id number]           Patient Information     Date Of Birth          1960        Visit Information        Provider Department      7/23/2018 8:40 AM Kellie Nobles PA-C [facility]        Today's Diagnoses     Pancreatic adenocarcinoma (H)    -  1      Care Instructions    Preventive Care:    Colorectal Cancer Screening: During our visit today, we discussed that it is recommended you receive colorectal cancer screening. Please call or make an appointment with your primary care provider to discuss this. You may also call the [facility] scheduling line ([phone]) to set up a colonoscopy appointment.        July 2018 Sunday Monday Tuesday Wednesday Thursday Friday Saturday   1     2     [facility]    9:00 AM   (15 min.)   [facility]   [facility] Lab Draw     [facility] ONC INFUSION 60    9:30 AM   (60 min.)   [facility] ONCOLOGY INFUSION   [facility] 3     4     5     6     7       8     9     [facility] DRAW    9:00 AM   (15 min.)   [facility] DRAW   [facility] Lab Draw     [facility] ONC INFUSION 60    9:30 AM   (60 min.)   [facility] ONCOLOGY INFUSION   [facility]     LONG   10:45 AM   (30 min.)   Yeni Mota RN   [facility] Diabetes     [facility] RETURN DIABETES   11:45 AM   (30 min.)   Es Gallo MD   [facility] Endocrinology 10     11     12     13     14       15     16     [facility] DRAW   12:45 PM   (15 min.)   [facility] D[facility]   [facility] Draw     [facility] RETURN   12:55 PM   (50 min.)   Iwona Galan PA   [facility] 17     18     19     20     21       22     23     [facility] DRAW    8:15 AM   (15 min.)   [facility] D[facility]   [facility] Lab Draw     [facility] RETURN    8:25 AM   (50 min.)   Kellie Nobles PA-C   [facility] 24     25     26     27     28       29      30     [facility] DRAW    8:45 AM   (15 min.)    [facility] LAB DRAW   [facility] Draw     [facility] ONC INFUSION 60    9:30 AM   (60 min.)    ONCOLOGY INFUSION   [facility] 31 August 2018 Sunday Monday Tuesday Wednesday Thursday Friday Saturday                  1     2     3     4       5     6     [facility] DRAW    8:30 AM   (15 min.)    [facility] LAB DRAW   [facility] Draw     [facility] ONC INFUSION 60    9:00 AM   (60 min.)    ONCOLOGY INFUSION   [facility] 7     8     9     10     11       12     13     [facility] DRAW    1:00 PM   (15 min.)    [facility] LAB DRAW   [facility] Draw     [facility] ONC INFUSION 60    1:30 PM   (60 min.)    ONCOLOGY INFUSION   [facility] 14     15     16     17     18       19     20     21     22     23     24     25       26     27     28     [facility] DRAW    8:00 AM   (15 min.)    [facility]     [facility] RETURN    8:25 AM   (50 min.)   Kellie Nobles PA-C   [facility]     [facility] ONC INFUSION 60    9:30 AM   (60 min.)    ONCOLOGY INFUSION   [facility] 29 30 31 September 2018 Sunday Monday Tuesday Wednesday Thursday Friday Saturday                                 1       2     3     4     5     6     7     8       9     10     11     [facility] DRAW    9:00 AM   (15 min.)    [facility]     [facility] ONC INFUSION 60    9:30 AM   (60 min.)    ONCOLOGY INFUSION   [facility] 12     13     14     15       16     17     CT CHEST/ABDOMEN/PELVIS W    9:15 AM   (30 min.)   [id number] [place] [address] CT 18     19     20     21     22       23     24     [facility] DRAW    7:30 AM   (15 min.)    [facility] LAB DRAW   [facility] Draw     [facility] RETURN    8:00 AM   (30 min.)   Jovany Monk  MD   [facility] 25     26     27     28     29       30                                                    Follow-ups after your visit        Your next 10 appointments already scheduled     Jul 30, 2018  8:45 AM CDT   [facility] Lab Draw with [facility] DRAW   [facility] Mason[facility] Lab Draw ([facility])    [address] [facility]  [address]  [facility] [address]   [phone]            Jul 30, 2018  9:30 AM CDT   Infusion 60 with UC ONCOLOGY INFUSION, UC 21 ATC   [facility] ([facility])    [address] [place] [address]  [address]  [facility] [address]   [phone]            Aug 06, 2018  8:30 AM CDT   [facility]ason[facility] Lab Draw with [facility] DRAW   [facility] Mason[facility] Lab Draw ([facility])    [address] [place] [address]  [address]  [facility] [address]   [phone]            Aug 06, 2018  9:00 AM CDT   Infusion 60 with UC ONCOLOGY INFUSION, UC 25 ATC   [facility]    [address] [place] [address]  [address]  [facility] [address]   [phone]            Aug 13, 2018  1:00 PM CDT   Masonic Lab Draw with [facility] LAB DRAW   [facility] Masonic Lab Draw ([facility])    [address] [place] [address]  [address]  [facility] [address]   [phone]            Aug 13, 2018  1:30 PM CDT   Infusion 60 with UC ONCOLOGY INFUSION, UC 28 ATC   [facility]    [address] [place] [address]  [address]  [facility] [address]   [phone]            Aug 28, 2018  8:00 AM CDT   Masonic Lab Draw with [facility]ONIC LAB DRAW   [facility] Masonic Lab Draw ([facility])    [address] [place] [address]  [address]  [facility] [address]   [phone]              Who to contact     If you have questions or need follow up information about today's clinic visit or your schedule please contact [facility] directly at [phone].  Normal or non-critical lab and imaging results will be communicated to you by MyChart, letter or phone within 4 business days after the clinic has received the results. If you do not hear from us within 7 days, please contact the clinic through MyChart or phone. If you have a critical or abnormal lab result, we will notify you by phone as soon as possible.  Submit refill requests through [employer] or call your pharmacy and they will forward the refill request to us. Please allow 3 business days for your refill to be completed.          Additional Information About Your Visit        [employer] Information     [employer] gives you secure access to your electronic health record. If you see a primary care provider, you can also send messages to your care team and make appointments. If you have questions, please call your primary care clinic.  If you do not have a primary care provider, please call [phone] and they will assist you.        Care EveryWhere ID     This is your Care EveryWhere ID. This could be used by other organizations to access your [facility] medical records  [id number]        Your Vitals Were     Pulse Temperature Respirations Height Pulse Oximetry BMI (Body Mass Index)    74 97.5  F (36.4  C) (Tympanic) 16 1.651 m (5' 5\") 97% 25.24 kg/m2       Blood Pressure from Last 3 Encounters:   07/23/18 92/62   07/16/18 92/63   07/09/18 102/63    Weight from Last 3 Encounters:   07/23/18 68.8 kg (151 lb 11.2 oz)   07/16/18 68.3 kg (150 lb 9.6 oz)   07/09/18 71.2 kg (157 lb)              Today, you had the following     No orders found for display       Primary Care Provider Office Phone # Fax #    Solange Mcgill [phone] [address] [facility] [address]        Equal Access to Services     CHASE DE GUZMAN AH: Tre Ruiz, waaxda luqadaha, qaybta kaalre farooq. " "So [facility] [phone].    ATENCIÓN: Si angelicala pedro, tiene a quiros disposición servicios gratuitos de asistencia lingüística. Macy al [phone].    We comply with applicable federal civil rights laws and [place] laws. We do not discriminate on the basis of race, color, national origin, age, disability, sex, sexual orientation, or gender identity.            Thank you!     Thank you for choosing [facility]  for your care. Our goal is always to provide you with excellent care. Hearing back from our patients is one way we can continue to improve our services. Please take a few minutes to complete the written survey that you may receive in the mail after your visit with us. Thank you!             Your Updated Medication List - Protect others around you: Learn how to safely use, store and throw away your medicines at www.disposemymeds.org.          This list is accurate as of 7/23/18 11:59 PM.  Always use your most recent med list.                   Brand Name Dispense Instructions for use Diagnosis    acetaminophen 500 MG tablet    TYLENOL    100 tablet    Take 2 tablets (1,000 mg) by mouth every 8 hours    Acute post-operative pain       amylase-lipase-protease 39047-95840 units Cpep per EC capsule    CREON    450 capsule    Take 2-3 with meals / 1-2 with snacks, up to 15 per day.    Necrotizing pancreatitis       BD insulin syringe ultrafine 31G X 5/16\" 0.3 ML   Generic drug:  insulin syringe-needle U-100           capecitabine 500 MG tablet CHEMO    XELODA    126 tablet    Take 3 tablets (1,500 mg) by mouth 2 times daily for 21 days Days 1 through 21, then 7 days off. Take within 30 mins after a meal.    Pancreatic adenocarcinoma (H)       docusate sodium 100 MG capsule    COLACE    60 capsule    Take 1 capsule (100 mg) by mouth daily    Other constipation       insulin  UNIT/ML injection    HumuLIN N/NovoLIN N    3 vial    Inject 10 units twice daily on infusion days, 5 units twice daily " on all other days.    Type 2 diabetes mellitus with diabetic polyneuropathy, with long-term current use of insulin (H)       insulin pen needle 32G X 4 MM    BD DOTTY U/F    100 each    Use 2 daily as directed.    Hyperglycemia, Malignant neoplasm of pancreas, unspecified location of malignancy (H)       insulin syringes (disposable) U-100 0.3 ML Misc     1 each    Inject 10 units twice daily on infusion days, 5 units twice daily all other days    Type 2 diabetes mellitus with diabetic polyneuropathy, with long-term current use of insulin (H)       lidocaine (viscous) 2 % solution    XYLOCAINE    100 mL    Take 15 mLs by mouth every 2 hours as needed for moderate pain swish and spit every 3-8 hours as needed; max 8 doses/24 hour period    Mucositis due to chemotherapy, Pancreatic adenocarcinoma (H)       LORazepam 0.5 MG tablet    ATIVAN    30 tablet    Take 1 tablet (0.5 mg) by mouth every 4 hours as needed (Anxiety, Nausea/Vomiting or Sleep)    Pancreatic adenocarcinoma (H)       magic mouthwash suspension (diphenhydrAMINE, lidocaine, aluminum-magnesium & simethicone) Susp compounding kit     237 mL    Swish and swallow 5-10 mLs in mouth every 6 hours as needed for mouth sores    Mouth sores       nystatin [id number] UNIT/ML suspension    MYCOSTATIN    280 mL    Take 5 mLs (500,000 Units) by mouth 4 times daily Swish and swallow 4 times daily    Mouth sores       ondansetron 8 MG tablet    ZOFRAN    30 tablet    Take 1 tablet (8 mg) by mouth every 8 hours as needed for nausea    Pancreatic adenocarcinoma (H)       ONETOUCH DELICA LANCETS 33G Misc     100 each    4 lancets daily    Type 2 diabetes mellitus with diabetic polyneuropathy, with long-term current use of insulin (H)       oxyCODONE 5 MG/5ML solution    ROXICODONE    473 mL    Take 5-10 mLs (5-10 mg) by mouth every 6 hours as needed for severe pain    Mucositis due to chemotherapy, Pancreatic adenocarcinoma (H)       pantoprazole 40 MG EC tablet    PROTONIX     90 tablet    Take 1 tablet (40 mg) by mouth every morning    Pancreatic adenocarcinoma (H)       polyethylene glycol Packet    MIRALAX/GLYCOLAX    30 packet    Take 17 g by mouth daily    Drug-induced constipation       prochlorperazine 10 MG tablet    COMPAZINE    30 tablet    Take 1 tablet (10 mg) by mouth every 6 hours as needed (Nausea/Vomiting)    Pancreatic adenocarcinoma (H)

## 2018-07-23 NOTE — PROGRESS NOTES
Oncology/Hematology Visit Note  Jul 23, 2018    Reason for Visit: Follow up of resectable pancreatic cancer    History of Present Illness: Staging CT chest/abd/pelvis on 12/9/17 showed several small pulmonary nodules (largest 3mm), unchanged mass in the pancreatic tail, mildly prominent mesenteric nodes thought likely reactive, and small right hepatic lobe hypodensities. Abdominal MRI on 12/10/17 showed hepatic hemangiomas, no evidence of metastatic disease to the liver.   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     Kitty met with Dr Monk and discussed neoadjuvant chemotherapy with FOLFIRINOX, then surgery and then adjuvant chemotherapy. She was seen on 1/15/18 for cycle 1 and then hospitalized for pancreatitis on 1/20/18 where she was managed with IV fluds and pain control. GI took her to the OR to attempt pancreatic duct stent placement but unfortunately it was completely obstructed. They were able to place a pancreatic stent and perform an EUS cyst-gastrostomy with plans to repeat Xray a month following to ensure stent passage. She was d/c 1/27. Cycle 2 was given 2/5/18 (a week delayed due to hospitalization).     She went to ED on 2/11 with worsening abdominal pain and was worried about pancreatitis. W/u with CT and labs including lipase were negative. She had elevated WBC of 33K but had Neulasta on 2/8.     Cycle 3 was given on 2/20 with dose-reduction (10%) of oxaliplatin due to significant cold sensitivity and motor neuropathy. Complicated by bronchitis.    Cycle 4 was given 3/8/18. She went on to have distal pancreatotomy and splenectomy and cholecystectomy on 4/17/18. Final pathology showed complete pathologic response, 26 benign lymph nodes, and necrotizing pancreatitis.     She started adjuvant chemotherapy with Xeloda and gemcitabine 5/30/18. She had baseline CT on 5/22/18 showing no evidence of  "recurrent disease. Here today prior to cycle 3.     Interval History:  Mrs. Bangura returns to clinic today with her  prior to cycle 3 day 1. She had horrible mucositis this past cycle, and was seen on 7/16 by Iwona Galan. Her last day of Xeloda was 7/15. Mouth sores started around 7/7. She was using salt and soda rinses, nystatin, MMW and continued to have severe pain and could not talk and could barely eat or drink. Iwona started her on oxycodone and viscous lidocaine. Mouth sores have slowly improved this week. Today, she has 2 tender sores on L lateral tongue and L base of tongue. She had a few low grade temps over the past 10 days but none in past several days. She is now able to eat soft foods and drink so her weight is trending back up. She had had no nausea, vomiting, diarrhea, HFS. She does not feel up to chemo today.     Review of Systems:  Patient denies fevers, chills, night sweats, unexplained weight changes, headaches, dizziness, vision or hearing changes, new lumps or bumps, chest pain, shortness of breath, cough, abdominal pain, nausea, vomiting, changes to bowel or bladder, swelling of extremities, bleeding issues, or rash.    Current Outpatient Prescriptions   Medication Sig Dispense Refill     amylase-lipase-protease (CREON) 46263-70465 UNITS CPEP per EC capsule Take 2-3 with meals / 1-2 with snacks, up to 15 per day. 450 capsule 6     BD INSULIN SYRINGE ULTRAFINE 31G X 5/16\" 0.3 ML        blood glucose monitoring (ONETOUCH VERIO IQ) test strip Use to test blood sugar 4 times daily or as directed. 100 each 3     docusate sodium (COLACE) 100 MG capsule Take 1 capsule (100 mg) by mouth daily 60 capsule 1     insulin NPH (HUMULIN N/NOVOLIN N) 100 UNIT/ML injection Inject 10 units twice daily on infusion days, 5 units twice daily on all other days. 3 vial 3     insulin pen needle (BD DOTTY U/F) 32G X 4 MM Use 2 daily as directed. 100 each 11     insulin syringes, disposable, U-100 0.3 ML " MISC Inject 10 units twice daily on infusion days, 5 units twice daily all other days 1 each 3     lidocaine, viscous, (XYLOCAINE) 2 % solution Take 15 mLs by mouth every 2 hours as needed for moderate pain swish and spit every 3-8 hours as needed; max 8 doses/24 hour period 100 mL 1     magic mouthwash (FIRST-MOUTHWASH BLM) suspension Swish and swallow 5-10 mLs in mouth every 6 hours as needed for mouth sores 237 mL 1     nystatin (MYCOSTATIN) 444698 UNIT/ML suspension Take 5 mLs (500,000 Units) by mouth 4 times daily Swish and swallow 4 times daily 280 mL 1     ONETOUCH DELICA LANCETS 33G MISC 4 lancets daily 100 each 3     oxyCODONE (ROXICODONE) 5 MG/5ML solution Take 5-10 mLs (5-10 mg) by mouth every 6 hours as needed for severe pain 473 mL 0     pantoprazole (PROTONIX) 40 MG EC tablet Take 1 tablet (40 mg) by mouth every morning 90 tablet 1     acetaminophen (TYLENOL) 500 MG tablet Take 2 tablets (1,000 mg) by mouth every 8 hours (Patient not taking: Reported on 7/23/2018) 100 tablet 1     capecitabine (XELODA) 500 MG tablet CHEMO Take 3 tablets (1,500 mg) by mouth 2 times daily for 21 days Days 1 through 21, then 7 days off. Take within 30 mins after a meal. 126 tablet 0     LORazepam (ATIVAN) 0.5 MG tablet Take 1 tablet (0.5 mg) by mouth every 4 hours as needed (Anxiety, Nausea/Vomiting or Sleep) (Patient not taking: Reported on 7/23/2018) 30 tablet 2     ondansetron (ZOFRAN) 8 MG tablet Take 1 tablet (8 mg) by mouth every 8 hours as needed for nausea (Patient not taking: Reported on 7/23/2018) 30 tablet 0     polyethylene glycol (MIRALAX/GLYCOLAX) Packet Take 17 g by mouth daily (Patient not taking: Reported on 7/23/2018) 30 packet 0     prochlorperazine (COMPAZINE) 10 MG tablet Take 1 tablet (10 mg) by mouth every 6 hours as needed (Nausea/Vomiting) (Patient not taking: Reported on 7/23/2018) 30 tablet 2       Physical Examination:  BP 92/62  Pulse 74  Temp 97.5  F (36.4  C) (Tympanic)  Resp 16  Ht  "1.651 m (5' 5\")  Wt 68.8 kg (151 lb 11.2 oz)  SpO2 97%  BMI 25.24 kg/m2  Wt Readings from Last 10 Encounters:   07/23/18 68.8 kg (151 lb 11.2 oz)   07/16/18 68.3 kg (150 lb 9.6 oz)   07/09/18 71.2 kg (157 lb)   07/09/18 71.5 kg (157 lb 11.2 oz)   07/02/18 68.9 kg (151 lb 12.8 oz)   06/26/18 69.4 kg (153 lb 1.6 oz)   06/12/18 67.1 kg (147 lb 14.4 oz)   06/05/18 67.7 kg (149 lb 4 oz)   06/05/18 67.7 kg (149 lb 3.2 oz)   05/30/18 68.9 kg (152 lb)     Constitutional: Well-appearing female in no acute distress.  Eyes: EOMI, PERRL. No scleral icterus.  ENT: Oral mucosa is moist without thrush. Small ulcerative mucositis L lateral tongue by second molar   Lymphatic: Neck is supple without cervical or supraclavicular lymphadenopathy.   Cardiovascular: Regular rate and rhythm. No murmurs, gallops, or rubs. No peripheral edema.  Respiratory: Clear to auscultation bilaterally. No wheezes or crackles.   Gastrointestinal: Bowel sounds present. Abdomen soft, nontender, nondistended. No palpable hepatosplenomegaly or masses.   Neurologic: Cranial nerves II through XII are grossly intact.  Skin: No rashes, petechiae, or bruising noted on exposed skin.    Laboratory Data:   7/23/2018 08:44   Sodium 139   Potassium 4.2   Chloride 106   Carbon Dioxide 28   Urea Nitrogen 17   Creatinine 0.64   GFR Estimate >90   GFR Estimate If Black >90   Calcium 8.9   Anion Gap 5   Albumin 3.4   Protein Total 7.4   Bilirubin Total 0.3   Alkaline Phosphatase 110   ALT 17   AST 24   Glucose 134 (H)   WBC 6.3   Hemoglobin 12.1   Hematocrit 36.9   Platelet Count 637 (H)   RBC Count 4.03   MCV 92   MCH 30.0   MCHC 32.8   RDW 16.8 (H)   Diff Method Automated Method   % Neutrophils 35.1   % Lymphocytes 30.3   % Monocytes 29.1   % Eosinophils 4.4   % Basophils 0.9   % Immature Granulocytes 0.2   Nucleated RBCs 0   Absolute Neutrophil 2.2   Absolute Lymphocytes 1.9   Absolute Monocytes 1.8 (H)   Absolute Eosinophils 0.3   Absolute Basophils 0.1   Abs " Immature Granulocytes 0.0   Absolute Nucleated RBC 0.0   Platelet Estimate Confirming automa...       Assessment and Plan:  1. Pancreatic cancer  S/p 4 cycles of neoadjuvant  FOLFIRINOX with good response so she was able to undergo distal pancreatectomy and splenectomy. Her pathology showed complete pathologic response. She started adjuvant gem/xeloda 2 months ago. Cycle 2 complicated by severe mucositis related to Xeloda. Will defer cycle 3 by one week and start next week with a dose reduction of xeloda to 1000 mg BID days 1-21 and 7 days off.  Plan for 4 cycles followed by surveillance. She will call with any interval concerns.     2. Mucositis: Can complete Nystatin and then stop. Keep using salt and soda rinses about 5x per day. Continue MMW, viscous lidocaine, and oxycodone prn.     3. DM: Secondary to prediabetes, family history, and now distal pancreatectomy. She saw endocrinology and was started on NPH with dosing for steroids. Will decrease dex to 8 mg given mild nausea.     4. Weight loss: Related to mucositis. She has regained 1 pound since last week. Keep working on hydration and eating soft foods.     Kellie Nobles PA-C  [facility]  [address], [place] [id number] [phone]

## 2018-07-23 NOTE — PATIENT INSTRUCTIONS
Preventive Care:    Colorectal Cancer Screening: During our visit today, we discussed that it is recommended you receive colorectal cancer screening. Please call or make an appointment with your primary care provider to discuss this. You may also call the [facility] scheduling line ([phone]) to set up a colonoscopy appointment.        July 2018 Sunday Monday Tuesday Wednesday Thursday Friday Saturday   1     2     [facility]    9:00 AM   (15 min.)   [facility]   [facility] Draw     [facility] ONC INFUSION 60    9:30 AM   (60 min.)   [facility] ONCOLOGY INFUSION   [facility] 3     4     5     6     7       8     9     [facility] DRAW    9:00 AM   (15 min.)   [facility]   [facility] Draw     [facility] ONC INFUSION 60    9:30 AM   (60 min.)    ONCOLOGY INFUSION   [facility]     LONG   10:45 AM   (30 min.)   Yeni Mota RN   [facility] Diabetes     [facility] RETURN DIABETES   11:45 AM   (30 min.)   Es Gallo MD   [facility] Endocrinology 10     11     12     13     14       15     16     [facility] D[facility]   12:45 PM   (15 min.)   [facility]   [facility] Draw     [facility] RETURN   12:55 PM   (50 min.)   Iwona Galan PA   [facility] 17     18     19     20     21       22     23     [facility] DRAW    8:15 AM   (15 min.)   [facility]   [facility] Draw     [facility] RETURN    8:25 AM   (50 min.)   Kellie Nobles PA-C   [facility] 24     25     26     27     28       29     30     [facility] DRAW    8:45 AM   (15 min.)   [facility]   [facility] Draw     [facility] ONC INFUSION 60    9:30 AM   (60 min.)   [facility] ONCOLOGY INFUSION   [facility] 31 August 2018 Sunday Monday Tuesday Wednesday Thursday Friday Saturday                  1     2     3     4       5     6     [facility]  DRAW    8:30 AM   (15 min.)    [facility] DRAW   [facility] Draw     [facility] ONC INFUSION 60    9:00 AM   (60 min.)   [facility] ONCOLOGY INFUSION   [facility] 7     8     9     10     11       12     13     [facility] DRAW    1:00 PM   (15 min.)    [facility] DRAW   [facility] Draw     [facility] ONC INFUSION 60    1:30 PM   (60 min.)    ONCOLOGY INFUSION   [facility] 14     15     16     17     18       19     20     21     22     23     24     25       26     27     28     [facility] DRAW    8:00 AM   (15 min.)    [facility] DRAW   [facility] Lab Draw     [facility] RETURN    8:25 AM   (50 min.)   Kellie Nobles PA-C   [facility]     [facility] ONC INFUSION 60    9:30 AM   (60 min.)    ONCOLOGY INFUSION   [facility] 29 30 31 September 2018 Sunday Monday Tuesday Wednesday Thursday Friday Saturday                                 1       2     3     4     5     6     7     8       9     10     11     [facility] DRAW    9:00 AM   (15 min.)    [facility]     [facility] ONC INFUSION 60    9:30 AM   (60 min.)    ONCOLOGY INFUSION   [facility] 12     13     14     15       16     17     CT CHEST/ABDOMEN/PELVIS W    9:15 AM   (30 min.)   [address] 18     19     20     21     22       23     24     [facility] DRAW    7:30 AM   (15 min.)    [facility] DRAW   [facility] Draw     [facility] RETURN    8:00 AM   (30 min.)   Jovany Monk MD   [facility] 25     26     27     28     29       30

## 2018-07-23 NOTE — LETTER
7/23/2018      RE: Kitty Bangura  [id number] [facility] [address]       Oncology/Hematology Visit Note  Jul 23, 2018    Reason for Visit: Follow up of resectable pancreatic cancer    History of Present Illness: Staging CT chest/abd/pelvis on 12/9/17 showed several small pulmonary nodules (largest 3mm), unchanged mass in the pancreatic tail, mildly prominent mesenteric nodes thought likely reactive, and small right hepatic lobe hypodensities. Abdominal MRI on 12/10/17 showed hepatic hemangiomas, no evidence of metastatic disease to the liver.   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     Kitty met with Dr Monk and discussed neoadjuvant chemotherapy with FOLFIRINOX, then surgery and then adjuvant chemotherapy. She was seen on 1/15/18 for cycle 1 and then hospitalized for pancreatitis on 1/20/18 where she was managed with IV fluds and pain control. GI took her to the OR to attempt pancreatic duct stent placement but unfortunately it was completely obstructed. They were able to place a pancreatic stent and perform an EUS cyst-gastrostomy with plans to repeat Xray a month following to ensure stent passage. She was d/c 1/27. Cycle 2 was given 2/5/18 (a week delayed due to hospitalization).     She went to ED on 2/11 with worsening abdominal pain and was worried about pancreatitis. W/u with CT and labs including lipase were negative. She had elevated WBC of 33K but had Neulasta on 2/8.     Cycle 3 was given on 2/20 with dose-reduction (10%) of oxaliplatin due to significant cold sensitivity and motor neuropathy. Complicated by bronchitis.    Cycle 4 was given 3/8/18. She went on to have distal pancreatotomy and splenectomy and cholecystectomy on 4/17/18. Final pathology showed complete pathologic response, 26 benign lymph nodes, and necrotizing pancreatitis.     She started adjuvant chemotherapy with Xeloda  "and gemcitabine 5/30/18. She had baseline CT on 5/22/18 showing no evidence of recurrent disease. Here today prior to cycle 3.     Interval History:  Mrs. Bangura returns to clinic today with her  prior to cycle 3 day 1. She had horrible mucositis this past cycle, and was seen on 7/16 by Iwona Galan. Her last day of Xeloda was 7/15. Mouth sores started around 7/7. She was using salt and soda rinses, nystatin, MMW and continued to have severe pain and could not talk and could barely eat or drink. Iwona started her on oxycodone and viscous lidocaine. Mouth sores have slowly improved this week. Today, she has 2 tender sores on L lateral tongue and L base of tongue. She had a few low grade temps over the past 10 days but none in past several days. She is now able to eat soft foods and drink so her weight is trending back up. She had had no nausea, vomiting, diarrhea, HFS. She does not feel up to chemo today.     Review of Systems:  Patient denies fevers, chills, night sweats, unexplained weight changes, headaches, dizziness, vision or hearing changes, new lumps or bumps, chest pain, shortness of breath, cough, abdominal pain, nausea, vomiting, changes to bowel or bladder, swelling of extremities, bleeding issues, or rash.    Current Outpatient Prescriptions   Medication Sig Dispense Refill     amylase-lipase-protease (CREON) [id number] UNITS CPEP per EC capsule Take 2-3 with meals / 1-2 with snacks, up to 15 per day. 450 capsule 6     BD INSULIN SYRINGE ULTRAFINE 31G X 5/16\" 0.3 ML        blood glucose monitoring (ONETOUCH VERIO IQ) test strip Use to test blood sugar 4 times daily or as directed. 100 each 3     docusate sodium (COLACE) 100 MG capsule Take 1 capsule (100 mg) by mouth daily 60 capsule 1     insulin NPH (HUMULIN N/NOVOLIN N) 100 UNIT/ML injection Inject 10 units twice daily on infusion days, 5 units twice daily on all other days. 3 vial 3     insulin pen needle (BD DOTTY U/F) 32G X 4 MM Use 2 " daily as directed. 100 each 11     insulin syringes, disposable, U-100 0.3 ML MISC Inject 10 units twice daily on infusion days, 5 units twice daily all other days 1 each 3     lidocaine, viscous, (XYLOCAINE) 2 % solution Take 15 mLs by mouth every 2 hours as needed for moderate pain swish and spit every 3-8 hours as needed; max 8 doses/24 hour period 100 mL 1     magic mouthwash (FIRST-MOUTHWASH BLM) suspension Swish and swallow 5-10 mLs in mouth every 6 hours as needed for mouth sores 237 mL 1     nystatin (MYCOSTATIN) 127547 UNIT/ML suspension Take 5 mLs (500,000 Units) by mouth 4 times daily Swish and swallow 4 times daily 280 mL 1     ONETOUCH DELICA LANCETS 33G MISC 4 lancets daily 100 each 3     oxyCODONE (ROXICODONE) 5 MG/5ML solution Take 5-10 mLs (5-10 mg) by mouth every 6 hours as needed for severe pain 473 mL 0     pantoprazole (PROTONIX) 40 MG EC tablet Take 1 tablet (40 mg) by mouth every morning 90 tablet 1     acetaminophen (TYLENOL) 500 MG tablet Take 2 tablets (1,000 mg) by mouth every 8 hours (Patient not taking: Reported on 7/23/2018) 100 tablet 1     capecitabine (XELODA) 500 MG tablet CHEMO Take 3 tablets (1,500 mg) by mouth 2 times daily for 21 days Days 1 through 21, then 7 days off. Take within 30 mins after a meal. 126 tablet 0     LORazepam (ATIVAN) 0.5 MG tablet Take 1 tablet (0.5 mg) by mouth every 4 hours as needed (Anxiety, Nausea/Vomiting or Sleep) (Patient not taking: Reported on 7/23/2018) 30 tablet 2     ondansetron (ZOFRAN) 8 MG tablet Take 1 tablet (8 mg) by mouth every 8 hours as needed for nausea (Patient not taking: Reported on 7/23/2018) 30 tablet 0     polyethylene glycol (MIRALAX/GLYCOLAX) Packet Take 17 g by mouth daily (Patient not taking: Reported on 7/23/2018) 30 packet 0     prochlorperazine (COMPAZINE) 10 MG tablet Take 1 tablet (10 mg) by mouth every 6 hours as needed (Nausea/Vomiting) (Patient not taking: Reported on 7/23/2018) 30 tablet 2       Physical  "Examination:  BP 92/62  Pulse 74  Temp 97.5  F (36.4  C) (Tympanic)  Resp 16  Ht 1.651 m (5' 5\")  Wt 68.8 kg (151 lb 11.2 oz)  SpO2 97%  BMI 25.24 kg/m2  Wt Readings from Last 10 Encounters:   07/23/18 68.8 kg (151 lb 11.2 oz)   07/16/18 68.3 kg (150 lb 9.6 oz)   07/09/18 71.2 kg (157 lb)   07/09/18 71.5 kg (157 lb 11.2 oz)   07/02/18 68.9 kg (151 lb 12.8 oz)   06/26/18 69.4 kg (153 lb 1.6 oz)   06/12/18 67.1 kg (147 lb 14.4 oz)   06/05/18 67.7 kg (149 lb 4 oz)   06/05/18 67.7 kg (149 lb 3.2 oz)   05/30/18 68.9 kg (152 lb)     Constitutional: Well-appearing female in no acute distress.  Eyes: EOMI, PERRL. No scleral icterus.  ENT: Oral mucosa is moist without thrush. Small ulcerative mucositis L lateral tongue by second molar   Lymphatic: Neck is supple without cervical or supraclavicular lymphadenopathy.   Cardiovascular: Regular rate and rhythm. No murmurs, gallops, or rubs. No peripheral edema.  Respiratory: Clear to auscultation bilaterally. No wheezes or crackles.   Gastrointestinal: Bowel sounds present. Abdomen soft, nontender, nondistended. No palpable hepatosplenomegaly or masses.   Neurologic: Cranial nerves II through XII are grossly intact.  Skin: No rashes, petechiae, or bruising noted on exposed skin.    Laboratory Data:   7/23/2018 08:44   Sodium 139   Potassium 4.2   Chloride 106   Carbon Dioxide 28   Urea Nitrogen 17   Creatinine 0.64   GFR Estimate >90   GFR Estimate If Black >90   Calcium 8.9   Anion Gap 5   Albumin 3.4   Protein Total 7.4   Bilirubin Total 0.3   Alkaline Phosphatase 110   ALT 17   AST 24   Glucose 134 (H)   WBC 6.3   Hemoglobin 12.1   Hematocrit 36.9   Platelet Count 637 (H)   RBC Count 4.03   MCV 92   MCH 30.0   MCHC 32.8   RDW 16.8 (H)   Diff Method Automated Method   % Neutrophils 35.1   % Lymphocytes 30.3   % Monocytes 29.1   % Eosinophils 4.4   % Basophils 0.9   % Immature Granulocytes 0.2   Nucleated RBCs 0   Absolute Neutrophil 2.2   Absolute Lymphocytes 1.9 "   Absolute Monocytes 1.8 (H)   Absolute Eosinophils 0.3   Absolute Basophils 0.1   Abs Immature Granulocytes 0.0   Absolute Nucleated RBC 0.0   Platelet Estimate Confirming automa...       Assessment and Plan:  1. Pancreatic cancer  S/p 4 cycles of neoadjuvant  FOLFIRINOX with good response so she was able to undergo distal pancreatectomy and splenectomy. Her pathology showed complete pathologic response. She started adjuvant gem/xeloda 2 months ago. Cycle 2 complicated by severe mucositis related to Xeloda. Will defer cycle 3 by one week and start next week with a dose reduction of xeloda to 1000 mg BID days 1-21 and 7 days off.  Plan for 4 cycles followed by surveillance. She will call with any interval concerns.     2. Mucositis: Can complete Nystatin and then stop. Keep using salt and soda rinses about 5x per day. Continue MMW, viscous lidocaine, and oxycodone prn.     3. DM: Secondary to prediabetes, family history, and now distal pancreatectomy. She saw endocrinology and was started on NPH with dosing for steroids. Will decrease dex to 8 mg given mild nausea.     4. Weight loss: Related to mucositis. She has regained 1 pound since last week. Keep working on hydration and eating soft foods.     Kellie Nobles PA-C  [facility]  [address] [place], [place] [address] [phone]

## 2018-07-23 NOTE — NURSING NOTE
"Oncology Rooming Note    July 23, 2018 8:55 AM   Kitty Bangura is a 57 year old female who presents for:    Chief Complaint   Patient presents with     Blood Draw     labs drawn from arm per pt request by RN     Oncology Clinic Visit     Pancreatic Ca f/u     Initial Vitals: BP 92/62  Pulse 74  Temp 97.5  F (36.4  C) (Tympanic)  Resp 16  Ht 1.651 m (5' 5\")  Wt 68.8 kg (151 lb 11.2 oz)  SpO2 97%  BMI 25.24 kg/m2 Estimated body mass index is 25.24 kg/(m^2) as calculated from the following:    Height as of this encounter: 1.651 m (5' 5\").    Weight as of this encounter: 68.8 kg (151 lb 11.2 oz). Body surface area is 1.78 meters squared.  Extreme Pain (8) Comment: mouth sores & throat   No LMP recorded. Patient is postmenopausal.  Allergies reviewed: Yes  Medications reviewed: Yes    Medications: MEDICATION REFILLS NEEDED TODAY. Provider was notified.  Pharmacy name entered into [facility]:    [id number] - [place], [place] - [address] [place] [address] [id number] - [place], [place] - [address] AT [facility] OF [address] [place] [address]    Clinical concerns: Yes, Patient complains of throat and mouth pain. Kellie was notified.    10 minutes for nursing intake (face to face time)     PATRICIA DUARTE LPN            "

## 2018-07-23 NOTE — NURSING NOTE
Chief Complaint   Patient presents with     Blood Draw     labs drawn from arm per pt request by RN     Labs drawn from right arm AC.  Patient checked in for provider visit.  Sherita Conroy RN

## 2018-07-30 ENCOUNTER — APPOINTMENT (OUTPATIENT)
Dept: LAB | Facility: CLINIC | Age: 58
End: 2018-07-30
Attending: INTERNAL MEDICINE
Payer: COMMERCIAL

## 2018-07-30 ENCOUNTER — INFUSION THERAPY VISIT (OUTPATIENT)
Dept: ONCOLOGY | Facility: CLINIC | Age: 58
End: 2018-07-30
Attending: INTERNAL MEDICINE
Payer: COMMERCIAL

## 2018-07-30 VITALS
DIASTOLIC BLOOD PRESSURE: 62 MMHG | HEART RATE: 81 BPM | TEMPERATURE: 98.2 F | OXYGEN SATURATION: 97 % | WEIGHT: 149.5 LBS | SYSTOLIC BLOOD PRESSURE: 95 MMHG | RESPIRATION RATE: 18 BRPM | BODY MASS INDEX: 24.88 KG/M2

## 2018-07-30 DIAGNOSIS — C25.9 PANCREATIC ADENOCARCINOMA (H): Primary | ICD-10-CM

## 2018-07-30 LAB
ALBUMIN SERPL-MCNC: 3.2 G/DL (ref 3.4–5)
ALP SERPL-CCNC: 117 U/L (ref 40–150)
ALT SERPL W P-5'-P-CCNC: 19 U/L (ref 0–50)
ANION GAP SERPL CALCULATED.3IONS-SCNC: 6 MMOL/L (ref 3–14)
AST SERPL W P-5'-P-CCNC: 23 U/L (ref 0–45)
BASOPHILS # BLD AUTO: 0.1 10E9/L (ref 0–0.2)
BASOPHILS NFR BLD AUTO: 1.4 %
BILIRUB SERPL-MCNC: 0.3 MG/DL (ref 0.2–1.3)
BUN SERPL-MCNC: 17 MG/DL (ref 7–30)
CALCIUM SERPL-MCNC: 8.7 MG/DL (ref 8.5–10.1)
CHLORIDE SERPL-SCNC: 104 MMOL/L (ref 94–109)
CO2 SERPL-SCNC: 27 MMOL/L (ref 20–32)
CREAT SERPL-MCNC: 0.64 MG/DL (ref 0.52–1.04)
DIFFERENTIAL METHOD BLD: ABNORMAL
EOSINOPHIL # BLD AUTO: 0.4 10E9/L (ref 0–0.7)
EOSINOPHIL NFR BLD AUTO: 3.8 %
ERYTHROCYTE [DISTWIDTH] IN BLOOD BY AUTOMATED COUNT: 17.7 % (ref 10–15)
GFR SERPL CREATININE-BSD FRML MDRD: >90 ML/MIN/1.7M2
GLUCOSE SERPL-MCNC: 213 MG/DL (ref 70–99)
HCT VFR BLD AUTO: 36.5 % (ref 35–47)
HGB BLD-MCNC: 12 G/DL (ref 11.7–15.7)
IMM GRANULOCYTES # BLD: 0.1 10E9/L (ref 0–0.4)
IMM GRANULOCYTES NFR BLD: 0.8 %
LYMPHOCYTES # BLD AUTO: 2.7 10E9/L (ref 0.8–5.3)
LYMPHOCYTES NFR BLD AUTO: 26.5 %
MCH RBC QN AUTO: 30.5 PG (ref 26.5–33)
MCHC RBC AUTO-ENTMCNC: 32.9 G/DL (ref 31.5–36.5)
MCV RBC AUTO: 93 FL (ref 78–100)
MONOCYTES # BLD AUTO: 2.5 10E9/L (ref 0–1.3)
MONOCYTES NFR BLD AUTO: 23.7 %
NEUTROPHILS # BLD AUTO: 4.5 10E9/L (ref 1.6–8.3)
NEUTROPHILS NFR BLD AUTO: 43.8 %
NRBC # BLD AUTO: 0 10*3/UL
NRBC BLD AUTO-RTO: 0 /100
PLATELET # BLD AUTO: 714 10E9/L (ref 150–450)
PLATELET # BLD EST: ABNORMAL 10*3/UL
POTASSIUM SERPL-SCNC: 4.2 MMOL/L (ref 3.4–5.3)
PROT SERPL-MCNC: 7.2 G/DL (ref 6.8–8.8)
RBC # BLD AUTO: 3.94 10E12/L (ref 3.8–5.2)
SODIUM SERPL-SCNC: 137 MMOL/L (ref 133–144)
WBC # BLD AUTO: 10.3 10E9/L (ref 4–11)

## 2018-07-30 PROCEDURE — 96367 TX/PROPH/DG ADDL SEQ IV INF: CPT

## 2018-07-30 PROCEDURE — 80053 COMPREHEN METABOLIC PANEL: CPT | Performed by: PHYSICIAN ASSISTANT

## 2018-07-30 PROCEDURE — 96413 CHEMO IV INFUSION 1 HR: CPT

## 2018-07-30 PROCEDURE — 85025 COMPLETE CBC W/AUTO DIFF WBC: CPT | Performed by: PHYSICIAN ASSISTANT

## 2018-07-30 PROCEDURE — 25000128 H RX IP 250 OP 636: Mod: ZF | Performed by: PHYSICIAN ASSISTANT

## 2018-07-30 PROCEDURE — 25000128 H RX IP 250 OP 636: Mod: ZF | Performed by: INTERNAL MEDICINE

## 2018-07-30 RX ORDER — HEPARIN SODIUM (PORCINE) LOCK FLUSH IV SOLN 100 UNIT/ML 100 UNIT/ML
5 SOLUTION INTRAVENOUS EVERY 8 HOURS PRN
Status: DISCONTINUED | OUTPATIENT
Start: 2018-07-30 | End: 2018-07-30 | Stop reason: HOSPADM

## 2018-07-30 RX ORDER — HEPARIN SODIUM (PORCINE) LOCK FLUSH IV SOLN 100 UNIT/ML 100 UNIT/ML
5 SOLUTION INTRAVENOUS ONCE
Status: COMPLETED | OUTPATIENT
Start: 2018-07-30 | End: 2018-07-30

## 2018-07-30 RX ORDER — CAPECITABINE 500 MG/1
1000 TABLET, FILM COATED ORAL 2 TIMES DAILY
Qty: 84 TABLET | Refills: 0 | Status: SHIPPED | OUTPATIENT
Start: 2018-07-30 | End: 2018-08-20

## 2018-07-30 RX ADMIN — DEXAMETHASONE SODIUM PHOSPHATE 8 MG: 10 INJECTION, SOLUTION INTRAMUSCULAR; INTRAVENOUS at 09:44

## 2018-07-30 RX ADMIN — SODIUM CHLORIDE, PRESERVATIVE FREE 5 ML: 5 INJECTION INTRAVENOUS at 11:05

## 2018-07-30 RX ADMIN — SODIUM CHLORIDE, PRESERVATIVE FREE 5 ML: 5 INJECTION INTRAVENOUS at 08:53

## 2018-07-30 RX ADMIN — SODIUM CHLORIDE 250 ML: 9 INJECTION, SOLUTION INTRAVENOUS at 09:39

## 2018-07-30 RX ADMIN — GEMCITABINE 1800 MG: 38 INJECTION INTRAVENOUS at 10:30

## 2018-07-30 ASSESSMENT — PAIN SCALES - GENERAL: PAINLEVEL: NO PAIN (0)

## 2018-07-30 NOTE — MR AVS SNAPSHOT
After Visit Summary   7/30/2018    Kitty Bangura    MRN: [id number]           Patient Information     Date Of Birth          1960        Visit Information        Provider Department      7/30/2018 9:30 AM  21 ATC;  ONCOLOGY INFUSION [facility]        Today's Diagnoses     Pancreatic adenocarcinoma (H)    -  1      Care Instructions    Contact Numbers  [facility]: [phone] (for scheduling changes)  Triage: [phone] (for symptoms)    Please call the [facility] Triage line if you experience a temperature greater than or equal to 100.4, shaking chills, have uncontrolled nausea, vomiting and/or diarrhea, dizziness, shortness of breath, chest pain, bleeding, unexplained bruising, or if you have any other new/concerning symptoms, questions or concerns.     If you are having any concerning symptoms or wish to speak to a provider before your next infusion visit, please call your care coordinator or triage to notify them so we can adequately serve you.     If you need a refill on a narcotic prescription or other medication, please call triage before your infusion appointment.             July 2018 Sunday Monday Tuesday Wednesday Thursday Friday Saturday   1     2     P [facility] LAB DRAW    9:00 AM   (15 min.)   [facility] LAB DRAW   [facility] Lab Draw     [facility] ONC INFUSION 60    9:30 AM   (60 min.)    ONCOLOGY INFUSION   [facility] 3     4     5     6     7       8     9     [facility] MASONIC LAB DRAW    9:00 AM   (15 min.)    MASONIC LAB DRAW   [facility] Lab Draw     [facility] ONC INFUSION 60    9:30 AM   (60 min.)    ONCOLOGY INFUSION   [facility]     LONG   10:45 AM   (30 min.)   Yeni Mota, BRENDAN   [facility] Diabetes     P RETURN DIABETES   11:45 AM   (30 min.)   Es Gallo MD   [facility] Endocrinology 10     11     12     13     14       15     16     [facility] LAB DRAW   12:45 PM   (15 min.)     MASONIC LAB DRAW   [facility] Masonic Lab Draw     UMP RETURN   12:55 PM   (50 min.)   Iwona Galan PA   [facility] 17     18     19     20     21       22     23     [facility] LAB DRAW    8:15 AM   (15 min.)    MASONIC LAB DRAW   [facility] Mason[facility] Lab Draw     UMP RETURN    8:25 AM   (50 min.)   Kellie Nobles PA-C   [facility] 24     25     26     27     28       29     30     [facility] LAB DRAW    8:45 AM   (15 min.)    MASONIC LAB DRAW   [facility] Lab Draw     UMP ONC INFUSION 60    9:30 AM   (60 min.)    ONCOLOGY INFUSION   [facility] 31 August 2018 Sunday Monday Tuesday Wednesday Thursday Friday Saturday                  1     2     3     4       5     6     [facility] LAB DRAW    8:30 AM   (15 min.)    [facility] LAB DRAW   [facility]     UMP ONC INFUSION 60    9:00 AM   (60 min.)    ONCOLOGY INFUSION   [facility] 7     8     9     10     11       12     13     [facility] LAB DRAW    1:00 PM   (15 min.)    MASONIC LAB DRAW   [facility] Mason[facility] Lab Draw     UMP ONC INFUSION 60    1:30 PM   (60 min.)    ONCOLOGY INFUSION   [facility] 14     15     16     17     18       19     20     21     22     23     24     25       26     27     28     [facility] LAB DRAW    8:00 AM   (15 min.)    MASONIC LAB DRAW   [facility] Lab Draw     UMP RETURN    8:25 AM   (50 min.)   Kellie Nobles PA-C   [facility]     UMP ONC INFUSION 60    9:30 AM   (60 min.)    ONCOLOGY INFUSION   [facility] 29     30     31                       Lab Results:  Recent Results (from the past 12 hour(s))   CBC with platelets differential    Collection Time: 07/30/18  8:58 AM   Result Value Ref Range    WBC 10.3 4.0 - 11.0 10e9/L    RBC Count 3.94 3.8 - 5.2 10e12/L    Hemoglobin 12.0 11.7 - 15.7 g/dL     Hematocrit 36.5 35.0 - 47.0 %    MCV 93 78 - 100 fl    MCH 30.5 26.5 - 33.0 pg    MCHC 32.9 31.5 - 36.5 g/dL    RDW 17.7 (H) 10.0 - 15.0 %    Platelet Count 714 (H) 150 - 450 10e9/L    Diff Method Automated Method     % Neutrophils 43.8 %    % Lymphocytes 26.5 %    % Monocytes 23.7 %    % Eosinophils 3.8 %    % Basophils 1.4 %    % Immature Granulocytes 0.8 %    Nucleated RBCs 0 0 /100    Absolute Neutrophil 4.5 1.6 - 8.3 10e9/L    Absolute Lymphocytes 2.7 0.8 - 5.3 10e9/L    Absolute Monocytes 2.5 (H) 0.0 - 1.3 10e9/L    Absolute Eosinophils 0.4 0.0 - 0.7 10e9/L    Absolute Basophils 0.1 0.0 - 0.2 10e9/L    Abs Immature Granulocytes 0.1 0 - 0.4 10e9/L    Absolute Nucleated RBC 0.0     Platelet Estimate Confirming automated cell count    Comprehensive metabolic panel    Collection Time: 07/30/18  8:58 AM   Result Value Ref Range    Sodium 137 133 - 144 mmol/L    Potassium 4.2 3.4 - 5.3 mmol/L    Chloride 104 94 - 109 mmol/L    Carbon Dioxide 27 20 - 32 mmol/L    Anion Gap 6 3 - 14 mmol/L    Glucose 213 (H) 70 - 99 mg/dL    Urea Nitrogen 17 7 - 30 mg/dL    Creatinine 0.64 0.52 - 1.04 mg/dL    GFR Estimate >90 >60 mL/min/1.7m2    GFR Estimate If Black >90 >60 mL/min/1.7m2    Calcium 8.7 8.5 - 10.1 mg/dL    Bilirubin Total 0.3 0.2 - 1.3 mg/dL    Albumin 3.2 (L) 3.4 - 5.0 g/dL    Protein Total 7.2 6.8 - 8.8 g/dL    Alkaline Phosphatase 117 40 - 150 U/L    ALT 19 0 - 50 U/L    AST 23 0 - 45 U/L              Follow-ups after your visit        Your next 10 appointments already scheduled     Aug 06, 2018  8:30 AM T   [facility] Lab Draw with  MASONIC LAB DRAW   [facility] Mason[facility] Lab Draw ([facility])    [address] [place] [address]  [address]  [facility] [address]   [phone]            Aug 06, 2018  9:00 AM CDT   Infusion 60 with UC ONCOLOGY INFUSION, UC 25 ATC   [facility] ([facility])    [address] [place] [address]  [address]  [facility] [address]    [phone]            Aug 13, 2018  1:00 PM CDT   Masonic Lab Draw with UC [facility] LAB DRAW   [facility]    [address]  [address]  [facility] [address] [phone]            Aug 13, 2018  1:30 PM CDT   Infusion 60 with UC ONCOLOGY INFUSION, UC 28 ATC   [facility] ([facility])    [address] [place] [address]  [address]  [facility] [address] [phone]            Aug 28, 2018  8:00 AM CDT   Masonic Lab Draw with UC [facility] LAB DRAW   [facility] ([facility])    [address] [place] [address]  [address]  [facility] [address] [phone]            Aug 28, 2018  8:40 AM CDT   (Arrive by 8:25 AM)   Return Visit with Kellie Nobles PA-C   [facility] ([facility])    [address] [place] [address]  [address]  [facility] [address] [phone]            Aug 28, 2018  9:30 AM CDT   Infusion 60 with UC ONCOLOGY INFUSION, UC 22 ATC   [facility] ([facility])    [address] [place] [address]  [address]  [facility] [id number] [phone]              Who to contact     If you have questions or need follow up information about today's clinic visit or your schedule please contact [facility] directly at [phone].  Normal or non-critical lab and imaging results will be communicated to you by MyChart, letter or phone within 4 business days after the clinic has received the results. If you do not hear from us within 7 days, please contact the clinic through MyChart or phone. If you have a critical or abnormal lab result, we will notify you by phone as soon as possible.  Submit refill requests through [employer] or call your pharmacy and they will forward the refill request to us. Please allow 3 business days for your refill to be completed.          Additional  Information About Your Visit        [employer] Information     [employer] gives you secure access to your electronic health record. If you see a primary care provider, you can also send messages to your care team and make appointments. If you have questions, please call your primary care clinic.  If you do not have a primary care provider, please call [phone] and they will assist you.        Care EveryWhere ID     This is your Care EveryWhere ID. This could be used by other organizations to access your [facility] medical records  [id number]        Your Vitals Were     Pulse Temperature Respirations Pulse Oximetry BMI (Body Mass Index)       81 98.2  F (36.8  C) (Oral) 18 97% 24.88 kg/m2        Blood Pressure from Last 3 Encounters:   07/30/18 95/62   07/23/18 92/62   07/16/18 92/63    Weight from Last 3 Encounters:   07/30/18 67.8 kg (149 lb 8 oz)   07/23/18 68.8 kg (151 lb 11.2 oz)   07/16/18 68.3 kg (150 lb 9.6 oz)              We Performed the Following     CBC with platelets differential     Comprehensive metabolic panel          Today's Medication Changes          These changes are accurate as of 7/30/18 11:32 AM.  If you have any questions, ask your nurse or doctor.               These medicines have changed or have updated prescriptions.        Dose/Directions    * capecitabine 500 MG tablet CHEMO   Commonly known as:  XELODA   This may have changed:  Another medication with the same name was added. Make sure you understand how and when to take each.   Used for:  Pancreatic adenocarcinoma (H)        Dose:  830 mg/m2   Take 3 tablets (1,500 mg) by mouth 2 times daily for 21 days Days 1 through 21, then 7 days off. Take within 30 mins after a meal.   Quantity:  126 tablet   Refills:  0       * capecitabine 500 MG tablet CHEMO   Commonly known as:  XELODA   This may have changed:  You were already taking a medication with the same name, and this prescription was added. Make sure you understand how and when to  take each.   Used for:  Pancreatic adenocarcinoma (H)        Dose:  1000 mg   Take 2 tablets (1,000 mg) by mouth 2 times daily for 21 days Days 1 through 21, then 7 days off. Take within 30 mins after a meal.   Quantity:  84 tablet   Refills:  0       * Notice:  This list has 2 medication(s) that are the same as other medications prescribed for you. Read the directions carefully, and ask your doctor or other care provider to review them with you.         Where to get your medicines      These medications were sent to [place], [place] - [address]  [address] [address]    Hours:  TRANSPLANT PHONE NUMBER [phone] Phone:  [phone]     capecitabine 500 MG tablet CHEMO                Primary Care Provider Office Phone # Fax #    Solange Mcgill [phone] [address] [address]        Equal Access to Services     CHASE DE GUZMAN AH: Hadii zaheer guerrero hadasho Soomaali, waaxda luqadaha, qaybta kaalmada adeegyada, re yip. So [facility] [phone].    ATENCIÓN: Si angelicala español, tiene a quiros disposición servicios gratuitos de asistencia lingüística. Llame al [phone].    We comply with applicable federal civil rights laws and [place] laws. We do not discriminate on the basis of race, color, national origin, age, disability, sex, sexual orientation, or gender identity.            Thank you!     Thank you for choosing [facility]  for your care. Our goal is always to provide you with excellent care. Hearing back from our patients is one way we can continue to improve our services. Please take a few minutes to complete the written survey that you may receive in the mail after your visit with us. Thank you!             Your Updated Medication List - Protect others around you: Learn how to safely use, store and throw away your medicines at  "www.disposemymeds.org.          This list is accurate as of 7/30/18 11:32 AM.  Always use your most recent med list.                   Brand Name Dispense Instructions for use Diagnosis    acetaminophen 500 MG tablet    TYLENOL    100 tablet    Take 2 tablets (1,000 mg) by mouth every 8 hours    Acute post-operative pain       amylase-lipase-protease 24103-86846 units Cpep per EC capsule    CREON    450 capsule    Take 2-3 with meals / 1-2 with snacks, up to 15 per day.    Necrotizing pancreatitis       BD insulin syringe ultrafine 31G X 5/16\" 0.3 ML   Generic drug:  insulin syringe-needle U-100           blood glucose monitoring test strip    ONETOUCH VERIO IQ    400 each    Use to test blood sugar 4 times daily or as directed.    Type 2 diabetes mellitus with diabetic polyneuropathy, with long-term current use of insulin (H)       * capecitabine 500 MG tablet CHEMO    XELODA    126 tablet    Take 3 tablets (1,500 mg) by mouth 2 times daily for 21 days Days 1 through 21, then 7 days off. Take within 30 mins after a meal.    Pancreatic adenocarcinoma (H)       * capecitabine 500 MG tablet CHEMO    XELODA    84 tablet    Take 2 tablets (1,000 mg) by mouth 2 times daily for 21 days Days 1 through 21, then 7 days off. Take within 30 mins after a meal.    Pancreatic adenocarcinoma (H)       docusate sodium 100 MG capsule    COLACE    60 capsule    Take 1 capsule (100 mg) by mouth daily    Other constipation       insulin  UNIT/ML injection    HumuLIN N/NovoLIN N    3 vial    Inject 10 units twice daily on infusion days, 5 units twice daily on all other days.    Type 2 diabetes mellitus with diabetic polyneuropathy, with long-term current use of insulin (H)       insulin pen needle 32G X 4 MM    BD DOTTY U/F    100 each    Use 2 daily as directed.    Hyperglycemia, Malignant neoplasm of pancreas, unspecified location of malignancy (H)       insulin syringes (disposable) U-100 0.3 ML Misc     1 each    Inject 10 " units twice daily on infusion days, 5 units twice daily all other days    Type 2 diabetes mellitus with diabetic polyneuropathy, with long-term current use of insulin (H)       lidocaine (viscous) 2 % solution    XYLOCAINE    100 mL    Take 15 mLs by mouth every 2 hours as needed for moderate pain swish and spit every 3-8 hours as needed; max 8 doses/24 hour period    Mucositis due to chemotherapy, Pancreatic adenocarcinoma (H)       LORazepam 0.5 MG tablet    ATIVAN    30 tablet    Take 1 tablet (0.5 mg) by mouth every 4 hours as needed (Anxiety, Nausea/Vomiting or Sleep)    Pancreatic adenocarcinoma (H)       magic mouthwash suspension (diphenhydrAMINE, lidocaine, aluminum-magnesium & simethicone) Susp compounding kit     237 mL    Swish and swallow 5-10 mLs in mouth every 6 hours as needed for mouth sores    Mouth sores       nystatin [id number] UNIT/ML suspension    MYCOSTATIN    280 mL    Take 5 mLs (500,000 Units) by mouth 4 times daily Swish and swallow 4 times daily    Mouth sores       ondansetron 8 MG tablet    ZOFRAN    30 tablet    Take 1 tablet (8 mg) by mouth every 8 hours as needed for nausea    Pancreatic adenocarcinoma (H)       ONETOUCH DELICA LANCETS 33G Misc     100 each    4 lancets daily    Type 2 diabetes mellitus with diabetic polyneuropathy, with long-term current use of insulin (H)       oxyCODONE 5 MG/5ML solution    ROXICODONE    473 mL    Take 5-10 mLs (5-10 mg) by mouth every 6 hours as needed for severe pain    Mucositis due to chemotherapy, Pancreatic adenocarcinoma (H)       pantoprazole 40 MG EC tablet    PROTONIX    90 tablet    Take 1 tablet (40 mg) by mouth every morning    Pancreatic adenocarcinoma (H)       polyethylene glycol Packet    MIRALAX/GLYCOLAX    30 packet    Take 17 g by mouth daily    Drug-induced constipation       prochlorperazine 10 MG tablet    COMPAZINE    30 tablet    Take 1 tablet (10 mg) by mouth every 6 hours as needed (Nausea/Vomiting)    Pancreatic  adenocarcinoma (H)       * Notice:  This list has 2 medication(s) that are the same as other medications prescribed for you. Read the directions carefully, and ask your doctor or other care provider to review them with you.

## 2018-07-30 NOTE — NURSING NOTE
Chief Complaint   Patient presents with     Port Draw     Labs drawn via port by RN. Line flushed and hep locked. VS taken.     Vicki Kellogg RN

## 2018-07-30 NOTE — PATIENT INSTRUCTIONS
Contact Numbers  [facility]: [phone] (for scheduling changes)  Triage: [phone] (for symptoms)    Please call the [facility] Triage line if you experience a temperature greater than or equal to 100.4, shaking chills, have uncontrolled nausea, vomiting and/or diarrhea, dizziness, shortness of breath, chest pain, bleeding, unexplained bruising, or if you have any other new/concerning symptoms, questions or concerns.     If you are having any concerning symptoms or wish to speak to a provider before your next infusion visit, please call your care coordinator or triage to notify them so we can adequately serve you.     If you need a refill on a narcotic prescription or other medication, please call triage before your infusion appointment.             July 2018 Sunday Monday Tuesday Wednesday Thursday Friday Saturday   1     2     [facility] DRAW    9:00 AM   (15 min.)    MASONIC LAB DRAW   [facility] Lab Draw     UMP ONC INFUSION 60    9:30 AM   (60 min.)    ONCOLOGY INFUSION   [facility] 3     4     5     6     7       8     9     [facility] LAB DRAW    9:00 AM   (15 min.)    MASONIC LAB DRAW   [facility] Lab Draw     UMP ONC INFUSION 60    9:30 AM   (60 min.)    ONCOLOGY INFUSION   [facility]     LONG   10:45 AM   (30 min.)   Yeni Mota RN   [facility] Diabetes     UMP RETURN DIABETES   11:45 AM   (30 min.)   Es Gallo MD   [facility] Endocrinology 10     11     12     13     14       15     16     [facility] LAB DRAW   12:45 PM   (15 min.)    MASONIC LAB DRAW   [facility] Lab Draw     UMP RETURN   12:55 PM   (50 min.)   Iwona Galan PA   [facility] 17     18     19     20     21       22     23     [facility] LAB DRAW    8:15 AM   (15 min.)    MASONIC LAB DRAW   [facility] Lab Draw     UMP RETURN    8:25 AM   (50 min.)   Kellie Nobles PA-C   [facility]  24     25     26     27     28       29     30     [facility] LAB DRAW    8:45 AM   (15 min.)    MASONIC LAB DRAW   [facility] Masonic Lab Draw     UMP ONC INFUSION 60    9:30 AM   (60 min.)    ONCOLOGY INFUSION   [facility] 31 August 2018 Sunday Monday Tuesday Wednesday Thursday Friday Saturday                  1     2     3     4       5     6     [facility] LAB DRAW    8:30 AM   (15 min.)   UC [facility] LAB DRAW   [facility] Masonic Lab Draw     UMP ONC INFUSION 60    9:00 AM   (60 min.)   UC ONCOLOGY INFUSION   [facility] 7     8     9     10     11       12     13     [facility] LAB DRAW    1:00 PM   (15 min.)    MASONIC LAB DRAW   [facility] Masonic Lab Draw     UMP ONC INFUSION 60    1:30 PM   (60 min.)    ONCOLOGY INFUSION   [facility] 14     15     16     17     18       19     20     21     22     23     24     25       26     27     28     [facility] LAB DRAW    8:00 AM   (15 min.)    MASONIC LAB DRAW   [facility] Lab Draw     UMP RETURN    8:25 AM   (50 min.)   Kellie Nobles PA-C   [facility]     UMP ONC INFUSION 60    9:30 AM   (60 min.)    ONCOLOGY INFUSION   [facility] 29     30     31                       Lab Results:  Recent Results (from the past 12 hour(s))   CBC with platelets differential    Collection Time: 07/30/18  8:58 AM   Result Value Ref Range    WBC 10.3 4.0 - 11.0 10e9/L    RBC Count 3.94 3.8 - 5.2 10e12/L    Hemoglobin 12.0 11.7 - 15.7 g/dL    Hematocrit 36.5 35.0 - 47.0 %    MCV 93 78 - 100 fl    MCH 30.5 26.5 - 33.0 pg    MCHC 32.9 31.5 - 36.5 g/dL    RDW 17.7 (H) 10.0 - 15.0 %    Platelet Count 714 (H) 150 - 450 10e9/L    Diff Method Automated Method     % Neutrophils 43.8 %    % Lymphocytes 26.5 %    % Monocytes 23.7 %    % Eosinophils 3.8 %    % Basophils 1.4 %    % Immature Granulocytes 0.8 %    Nucleated RBCs 0 0 /100     Absolute Neutrophil 4.5 1.6 - 8.3 10e9/L    Absolute Lymphocytes 2.7 0.8 - 5.3 10e9/L    Absolute Monocytes 2.5 (H) 0.0 - 1.3 10e9/L    Absolute Eosinophils 0.4 0.0 - 0.7 10e9/L    Absolute Basophils 0.1 0.0 - 0.2 10e9/L    Abs Immature Granulocytes 0.1 0 - 0.4 10e9/L    Absolute Nucleated RBC 0.0     Platelet Estimate Confirming automated cell count    Comprehensive metabolic panel    Collection Time: 07/30/18  8:58 AM   Result Value Ref Range    Sodium 137 133 - 144 mmol/L    Potassium 4.2 3.4 - 5.3 mmol/L    Chloride 104 94 - 109 mmol/L    Carbon Dioxide 27 20 - 32 mmol/L    Anion Gap 6 3 - 14 mmol/L    Glucose 213 (H) 70 - 99 mg/dL    Urea Nitrogen 17 7 - 30 mg/dL    Creatinine 0.64 0.52 - 1.04 mg/dL    GFR Estimate >90 >60 mL/min/1.7m2    GFR Estimate If Black >90 >60 mL/min/1.7m2    Calcium 8.7 8.5 - 10.1 mg/dL    Bilirubin Total 0.3 0.2 - 1.3 mg/dL    Albumin 3.2 (L) 3.4 - 5.0 g/dL    Protein Total 7.2 6.8 - 8.8 g/dL    Alkaline Phosphatase 117 40 - 150 U/L    ALT 19 0 - 50 U/L    AST 23 0 - 45 U/L

## 2018-07-30 NOTE — PROGRESS NOTES
Infusion Nursing Note:  Kitty Bangura presents today for Day 1 Cycle 3 Gemzar.    Patient seen by provider today: No   present during visit today: Not Applicable.    Note: Patient feels well today.  Mouthsores have fully healed.    Intravenous Access:  Implanted Port.    Treatment Conditions:  Lab Results   Component Value Date    HGB 12.0 07/30/2018     Lab Results   Component Value Date    WBC 10.3 07/30/2018      Lab Results   Component Value Date    ANEU 4.5 07/30/2018     Lab Results   Component Value Date     07/30/2018      Lab Results   Component Value Date     07/30/2018                   Lab Results   Component Value Date    POTASSIUM 4.2 07/30/2018           Lab Results   Component Value Date    CR 0.64 07/30/2018                   Lab Results   Component Value Date    ILIANA 8.7 07/30/2018                Lab Results   Component Value Date    BILITOTAL 0.3 07/30/2018           Lab Results   Component Value Date    ALBUMIN 3.2 07/30/2018                    Lab Results   Component Value Date    ALT 19 07/30/2018           Lab Results   Component Value Date    AST 23 07/30/2018       Results reviewed, labs MET treatment parameters, ok to proceed with treatment.      Post Infusion Assessment:  Patient tolerated infusion without incident.  Blood return noted pre and post infusion.  Site patent and intact, free from redness, edema or discomfort.  No evidence of extravasations.  Access discontinued per protocol.    Discharge Plan:   Prescription refills given for Xeloda.  Discharge instructions reviewed with: Patient.  Patient and/or family verbalized understanding of discharge instructions and all questions answered.  AVS to patient via [employer].  Patient will return 8/6/18 for next appointment.   Patient discharged in stable condition accompanied by: .  Departure Mode: Ambulatory.    Ewa Zapata RN

## 2018-08-06 ENCOUNTER — TELEPHONE (OUTPATIENT)
Dept: ONCOLOGY | Facility: CLINIC | Age: 58
End: 2018-08-06

## 2018-08-06 ENCOUNTER — INFUSION THERAPY VISIT (OUTPATIENT)
Dept: ONCOLOGY | Facility: CLINIC | Age: 58
End: 2018-08-06
Attending: INTERNAL MEDICINE
Payer: COMMERCIAL

## 2018-08-06 VITALS
HEART RATE: 71 BPM | TEMPERATURE: 97.8 F | DIASTOLIC BLOOD PRESSURE: 66 MMHG | OXYGEN SATURATION: 97 % | WEIGHT: 149.6 LBS | BODY MASS INDEX: 24.89 KG/M2 | RESPIRATION RATE: 16 BRPM | SYSTOLIC BLOOD PRESSURE: 103 MMHG

## 2018-08-06 DIAGNOSIS — C25.9 PANCREATIC ADENOCARCINOMA (H): Primary | ICD-10-CM

## 2018-08-06 DIAGNOSIS — Z79.899 ENCOUNTER FOR LONG-TERM (CURRENT) USE OF MEDICATIONS: ICD-10-CM

## 2018-08-06 LAB
ALBUMIN SERPL-MCNC: 3.3 G/DL (ref 3.4–5)
ALP SERPL-CCNC: 121 U/L (ref 40–150)
ALT SERPL W P-5'-P-CCNC: 24 U/L (ref 0–50)
ANION GAP SERPL CALCULATED.3IONS-SCNC: 6 MMOL/L (ref 3–14)
AST SERPL W P-5'-P-CCNC: 27 U/L (ref 0–45)
BASOPHILS # BLD AUTO: 0.1 10E9/L (ref 0–0.2)
BASOPHILS NFR BLD AUTO: 1.7 %
BILIRUB SERPL-MCNC: 0.5 MG/DL (ref 0.2–1.3)
BUN SERPL-MCNC: 20 MG/DL (ref 7–30)
CALCIUM SERPL-MCNC: 8.8 MG/DL (ref 8.5–10.1)
CHLORIDE SERPL-SCNC: 105 MMOL/L (ref 94–109)
CO2 SERPL-SCNC: 26 MMOL/L (ref 20–32)
CREAT SERPL-MCNC: 0.61 MG/DL (ref 0.52–1.04)
DIFFERENTIAL METHOD BLD: ABNORMAL
EOSINOPHIL # BLD AUTO: 0.1 10E9/L (ref 0–0.7)
EOSINOPHIL NFR BLD AUTO: 2.2 %
ERYTHROCYTE [DISTWIDTH] IN BLOOD BY AUTOMATED COUNT: 17.5 % (ref 10–15)
GFR SERPL CREATININE-BSD FRML MDRD: >90 ML/MIN/1.7M2
GLUCOSE SERPL-MCNC: 191 MG/DL (ref 70–99)
HCT VFR BLD AUTO: 35.1 % (ref 35–47)
HGB BLD-MCNC: 11.8 G/DL (ref 11.7–15.7)
IMM GRANULOCYTES # BLD: 0 10E9/L (ref 0–0.4)
IMM GRANULOCYTES NFR BLD: 0.3 %
LYMPHOCYTES # BLD AUTO: 2.7 10E9/L (ref 0.8–5.3)
LYMPHOCYTES NFR BLD AUTO: 41.8 %
MCH RBC QN AUTO: 30.5 PG (ref 26.5–33)
MCHC RBC AUTO-ENTMCNC: 33.6 G/DL (ref 31.5–36.5)
MCV RBC AUTO: 91 FL (ref 78–100)
MONOCYTES # BLD AUTO: 1.1 10E9/L (ref 0–1.3)
MONOCYTES NFR BLD AUTO: 16.7 %
NEUTROPHILS # BLD AUTO: 2.4 10E9/L (ref 1.6–8.3)
NEUTROPHILS NFR BLD AUTO: 37.3 %
NRBC # BLD AUTO: 0.1 10*3/UL
NRBC BLD AUTO-RTO: 1 /100
PLATELET # BLD AUTO: 401 10E9/L (ref 150–450)
POTASSIUM SERPL-SCNC: 4.3 MMOL/L (ref 3.4–5.3)
PROT SERPL-MCNC: 7.3 G/DL (ref 6.8–8.8)
RBC # BLD AUTO: 3.87 10E12/L (ref 3.8–5.2)
SODIUM SERPL-SCNC: 137 MMOL/L (ref 133–144)
WBC # BLD AUTO: 6.3 10E9/L (ref 4–11)

## 2018-08-06 PROCEDURE — 25000128 H RX IP 250 OP 636: Mod: ZF | Performed by: PHYSICIAN ASSISTANT

## 2018-08-06 PROCEDURE — 80053 COMPREHEN METABOLIC PANEL: CPT | Performed by: PHYSICIAN ASSISTANT

## 2018-08-06 PROCEDURE — 25000128 H RX IP 250 OP 636: Mod: ZF | Performed by: INTERNAL MEDICINE

## 2018-08-06 PROCEDURE — 96413 CHEMO IV INFUSION 1 HR: CPT

## 2018-08-06 PROCEDURE — 96367 TX/PROPH/DG ADDL SEQ IV INF: CPT

## 2018-08-06 PROCEDURE — 85025 COMPLETE CBC W/AUTO DIFF WBC: CPT | Performed by: PHYSICIAN ASSISTANT

## 2018-08-06 RX ORDER — HEPARIN SODIUM (PORCINE) LOCK FLUSH IV SOLN 100 UNIT/ML 100 UNIT/ML
5 SOLUTION INTRAVENOUS EVERY 8 HOURS
Status: DISCONTINUED | OUTPATIENT
Start: 2018-08-06 | End: 2018-08-06 | Stop reason: HOSPADM

## 2018-08-06 RX ORDER — HEPARIN SODIUM (PORCINE) LOCK FLUSH IV SOLN 100 UNIT/ML 100 UNIT/ML
5 SOLUTION INTRAVENOUS ONCE
Status: COMPLETED | OUTPATIENT
Start: 2018-08-06 | End: 2018-08-06

## 2018-08-06 RX ADMIN — SODIUM CHLORIDE 250 ML: 9 INJECTION, SOLUTION INTRAVENOUS at 09:05

## 2018-08-06 RX ADMIN — SODIUM CHLORIDE, PRESERVATIVE FREE 5 ML: 5 INJECTION INTRAVENOUS at 10:07

## 2018-08-06 RX ADMIN — DEXAMETHASONE SODIUM PHOSPHATE 8 MG: 10 INJECTION, SOLUTION INTRAMUSCULAR; INTRAVENOUS at 09:17

## 2018-08-06 RX ADMIN — SODIUM CHLORIDE, PRESERVATIVE FREE 5 ML: 5 INJECTION INTRAVENOUS at 08:43

## 2018-08-06 RX ADMIN — GEMCITABINE 1800 MG: 38 INJECTION, SOLUTION INTRAVENOUS at 09:34

## 2018-08-06 ASSESSMENT — PAIN SCALES - GENERAL: PAINLEVEL: NO PAIN (0)

## 2018-08-06 NOTE — PATIENT INSTRUCTIONS
Contact Numbers    [facility] [place]: [phone]  [facility] [place] and after hours / weekends / holidays:  [phone]      Please call the triage or after hours line if you experience a temperature greater than or equal to 100.5, shaking chills, have uncontrolled nausea, vomiting and/or diarrhea, dizziness, shortness of breath, chest pain, bleeding, unexplained bruising, or if you have any other new/concerning symptoms, questions or concerns.      If you are having any concerning symptoms or wish to speak to a provider before your next infusion visit, please call your care coordinator or triage to notify them so we can adequately serve you.     If you need a refill on a narcotic prescription or other medication, please call before your infusion appointment.                   August 2018 Sunday Monday Tuesday Wednesday Thursday Friday Saturday                  1     2     3     4       5     6     [facility]    8:30 AM   (15 min.)    MASONIC LAB DRAW   [facility] Lab Draw     [facility] ONC INFUSION 60    9:00 AM   (60 min.)    ONCOLOGY INFUSION   [facility] 7     8     9     10     11       12     13     [facility]    1:00 PM   (15 min.)    [facility] LAB DRAW   [facility] Lab Draw     [facility] ONC INFUSION 60    1:30 PM   (60 min.)    ONCOLOGY INFUSION   [facility] 14     15     16     17     18       19     20     21     22     23     24     25       26     27     28     [facility]    8:00 AM   (15 min.)    [facility]     [facility] RETURN    8:25 AM   (50 min.)   Kellie Nobles PA-C   [facility]     [facility] ONC INFUSION 60    9:30 AM   (60 min.)    ONCOLOGY INFUSION   [facility] 29 30 31 September 2018 Sunday Monday Tuesday Wednesday Thursday Friday Saturday                                 1       2     3     4     [facility]     9:00 AM   (15 min.)    [facility] LAB DRAW   [facility] Lab Draw     [facility] ONC INFUSION 60    9:30 AM   (60 min.)   [facility] ONCOLOGY INFUSION   [facility] 5     6     7     8       9     10     11     [facility]    9:00 AM   (15 min.)    [facility] LAB DRAW   [facility] Draw     [facility] ONC INFUSION 60    9:30 AM   (60 min.)   [facility] ONCOLOGY INFUSION   [facility] 12     13     14     15       16     17     CT CHEST/ABDOMEN/PELVIS W    9:15 AM   (30 min.)   [address] 18     19     20     21     22       23     24     [facility]    7:30 AM   (15 min.)    [facility]RAW   [facility]     [facility] RETURN    8:00 AM   (30 min.)   Jovany Monk MD   [facility]     [facility] ONC INFUSION 60    9:30 AM   (60 min.)    ONCOLOGY INFUSION   [facility] 25     26     27     28     29       30                                                Lab Results:  Recent Results (from the past 12 hour(s))   CBC with platelets differential    Collection Time: 08/06/18  8:51 AM   Result Value Ref Range    WBC 6.3 4.0 - 11.0 10e9/L    RBC Count 3.87 3.8 - 5.2 10e12/L    Hemoglobin 11.8 11.7 - 15.7 g/dL    Hematocrit 35.1 35.0 - 47.0 %    MCV 91 78 - 100 fl    MCH 30.5 26.5 - 33.0 pg    MCHC 33.6 31.5 - 36.5 g/dL    RDW 17.5 (H) 10.0 - 15.0 %    Platelet Count 401 150 - 450 10e9/L    Diff Method Automated Method     % Neutrophils 37.3 %    % Lymphocytes 41.8 %    % Monocytes 16.7 %    % Eosinophils 2.2 %    % Basophils 1.7 %    % Immature Granulocytes 0.3 %    Nucleated RBCs 1 (H) 0 /100    Absolute Neutrophil 2.4 1.6 - 8.3 10e9/L    Absolute Lymphocytes 2.7 0.8 - 5.3 10e9/L    Absolute Monocytes 1.1 0.0 - 1.3 10e9/L    Absolute Eosinophils 0.1 0.0 - 0.7 10e9/L    Absolute Basophils 0.1 0.0 - 0.2 10e9/L    Abs Immature Granulocytes 0.0 0 - 0.4 10e9/L    Absolute Nucleated RBC 0.1    Comprehensive metabolic panel     Collection Time: 08/06/18  8:51 AM   Result Value Ref Range    Sodium 137 133 - 144 mmol/L    Potassium 4.3 3.4 - 5.3 mmol/L    Chloride 105 94 - 109 mmol/L    Carbon Dioxide 26 20 - 32 mmol/L    Anion Gap 6 3 - 14 mmol/L    Glucose 191 (H) 70 - 99 mg/dL    Urea Nitrogen 20 7 - 30 mg/dL    Creatinine 0.61 0.52 - 1.04 mg/dL    GFR Estimate >90 >60 mL/min/1.7m2    GFR Estimate If Black >90 >60 mL/min/1.7m2    Calcium 8.8 8.5 - 10.1 mg/dL    Bilirubin Total 0.5 0.2 - 1.3 mg/dL    Albumin 3.3 (L) 3.4 - 5.0 g/dL    Protein Total 7.3 6.8 - 8.8 g/dL    Alkaline Phosphatase 121 40 - 150 U/L    ALT 24 0 - 50 U/L    AST 27 0 - 45 U/L

## 2018-08-06 NOTE — MR AVS SNAPSHOT
After Visit Summary   8/6/2018    Kitty Bangura    MRN: [id number]           Patient Information     Date Of Birth          1960        Visit Information        Provider Department      8/6/2018 9:00 AM  25 ATC;  ONCOLOGY INFUSION [facility]        Today's Diagnoses     Pancreatic adenocarcinoma (H)    -  1    Encounter for long-term (current) use of medications          Care Instructions    Contact Numbers    [facility] [place]: [phone]  [facility] [place] Triage and after hours / weekends / holidays:  [phone]      Please call the triage or after hours line if you experience a temperature greater than or equal to 100.5, shaking chills, have uncontrolled nausea, vomiting and/or diarrhea, dizziness, shortness of breath, chest pain, bleeding, unexplained bruising, or if you have any other new/concerning symptoms, questions or concerns.      If you are having any concerning symptoms or wish to speak to a provider before your next infusion visit, please call your care coordinator or triage to notify them so we can adequately serve you.     If you need a refill on a narcotic prescription or other medication, please call before your infusion appointment.                   August 2018 Sunday Monday Tuesday Wednesday Thursday Friday Saturday                  1     2     3     4       5     6     [facility]    8:30 AM   (15 min.)    [facility]     [facility] ONC INFUSION 60    9:00 AM   (60 min.)    ONCOLOGY INFUSION   [facility] 7     8     9     10     11       12     13     [facility]    1:00 PM   (15 min.)    [facility]ONIC LAB DRAW   [facility] Lab Draw     UMP ONC INFUSION 60    1:30 PM   (60 min.)    ONCOLOGY INFUSION   [facility] 14     15     16     17     18       19     20     21     22     23     24     25       26     27     28     [facility]    8:00 AM   (15 min.)     [facility]     [facility] RETURN    8:25 AM   (50 min.)   Kellie Nobles PA-C   [facility]     [facility] ONC INFUSION 60    9:30 AM   (60 min.)   [facility] ONCOLOGY INFUSION   [facility] 29 30 31 September 2018 Sunday Monday Tuesday Wednesday Thursday Friday Saturday                                 1       2     3     4     [facility]    9:00 AM   (15 min.)    [facility]RAW   [facility]     [facility] ONC INFUSION 60    9:30 AM   (60 min.)   UC ONCOLOGY INFUSION   [facility] 5     6     7     8       9     10     11     [facility]RAW    9:00 AM   (15 min.)    [facility] DRAW   [facility]     [facility] ONC INFUSION 60    9:30 AM   (60 min.)    ONCOLOGY INFUSION   [facility] 12     13     14     15       16     17     CT CHEST/ABDOMEN/PELVIS W    9:15 AM   (30 min.)   [address] 18     19     20     21     22       23     24     P [facility]IC LAB DRAW    7:30 AM   (15 min.)    [facility]IC LAB DRAW   [facility] Lab Draw     UMP RETURN    8:00 AM   (30 min.)   Jovany Monk MD   [facility]     [facility] ONC INFUSION 60    9:30 AM   (60 min.)    ONCOLOGY INFUSION   [facility] 25     26     27     28     29       30                                                Lab Results:  Recent Results (from the past 12 hour(s))   CBC with platelets differential    Collection Time: 08/06/18  8:51 AM   Result Value Ref Range    WBC 6.3 4.0 - 11.0 10e9/L    RBC Count 3.87 3.8 - 5.2 10e12/L    Hemoglobin 11.8 11.7 - 15.7 g/dL    Hematocrit 35.1 35.0 - 47.0 %    MCV 91 78 - 100 fl    MCH 30.5 26.5 - 33.0 pg    MCHC 33.6 31.5 - 36.5 g/dL    RDW 17.5 (H) 10.0 - 15.0 %    Platelet Count 401 150 - 450 10e9/L    Diff Method Automated Method     % Neutrophils 37.3 %    % Lymphocytes 41.8 %    % Monocytes 16.7 %    %  Eosinophils 2.2 %    % Basophils 1.7 %    % Immature Granulocytes 0.3 %    Nucleated RBCs 1 (H) 0 /100    Absolute Neutrophil 2.4 1.6 - 8.3 10e9/L    Absolute Lymphocytes 2.7 0.8 - 5.3 10e9/L    Absolute Monocytes 1.1 0.0 - 1.3 10e9/L    Absolute Eosinophils 0.1 0.0 - 0.7 10e9/L    Absolute Basophils 0.1 0.0 - 0.2 10e9/L    Abs Immature Granulocytes 0.0 0 - 0.4 10e9/L    Absolute Nucleated RBC 0.1    Comprehensive metabolic panel    Collection Time: 08/06/18  8:51 AM   Result Value Ref Range    Sodium 137 133 - 144 mmol/L    Potassium 4.3 3.4 - 5.3 mmol/L    Chloride 105 94 - 109 mmol/L    Carbon Dioxide 26 20 - 32 mmol/L    Anion Gap 6 3 - 14 mmol/L    Glucose 191 (H) 70 - 99 mg/dL    Urea Nitrogen 20 7 - 30 mg/dL    Creatinine 0.61 0.52 - 1.04 mg/dL    GFR Estimate >90 >60 mL/min/1.7m2    GFR Estimate If Black >90 >60 mL/min/1.7m2    Calcium 8.8 8.5 - 10.1 mg/dL    Bilirubin Total 0.5 0.2 - 1.3 mg/dL    Albumin 3.3 (L) 3.4 - 5.0 g/dL    Protein Total 7.3 6.8 - 8.8 g/dL    Alkaline Phosphatase 121 40 - 150 U/L    ALT 24 0 - 50 U/L    AST 27 0 - 45 U/L               Follow-ups after your visit        Your next 10 appointments already scheduled     Aug 13, 2018  1:00 PM CDT   [facility] Lab Draw with  [facility] LAB DRAW   [facility]    [address] [place] [address]  [address]  [facility] [id number] [phone]            Aug 13, 2018  1:30 PM CDT   Infusion 60 with UC ONCOLOGY INFUSION, UC 28 ATC   [facility] ([facility])    [address] [place] [address] [address]  [address]  [facility] [place] [id number] [phone]            Aug 28, 2018  8:00 AM CDT   Masonic Lab Draw with  MASONIC LAB DRAW   [facility] Lab Draw ([facility])    [address]  [address]  [facility] [place] [address]   [phone]            Aug 28, 2018  8:40 AM CDT   (Arrive by 8:25 AM)   Return Visit with FORREST Ackerman  [facility] ([facility])    [address]  [address]  [facility] [address]   [phone]            Aug 28, 2018  9:30 AM CDT   Infusion 60 with UC ONCOLOGY INFUSION, UC 22 ATC   [facility] ([facility])    [address]  [address]  [facility] [address]   [phone]            Sep 04, 2018  9:00 AM CDT   Masonic Lab Draw with UC MASONIC LAB DRAW   [facility] Lab Draw ([facility])    [address]  [address]  [facility] [address]   [phone]            Sep 04, 2018  9:30 AM CDT   Infusion 60 with UC ONCOLOGY INFUSION, UC 22 ATC   [facility] ([facility])    [address] [place] [address]  [address]  [facility] [address]   [phone]              Who to contact     If you have questions or need follow up information about today's clinic visit or your schedule please contact [employer] [facility] directly at [phone].  Normal or non-critical lab and imaging results will be communicated to you by [employer], letter or phone within 4 business days after the clinic has received the results. If you do not hear from us within 7 days, please contact the clinic through [employer] or phone. If you have a critical or abnormal lab result, we will notify you by phone as soon as possible.  Submit refill requests through [employer] or call your pharmacy and they will forward the refill request to us. Please allow 3 business days for your refill to be completed.          Additional Information About Your Visit        MyChart Information     [employer] gives you secure access to your electronic health record. If you see a primary care provider, you can also send messages to your care team and make appointments. If you have questions, please call your primary care clinic.  If you do not have a primary care provider, please call  [phone] and they will assist you.        Care EveryWhere ID     This is your Care EveryWhere ID. This could be used by other organizations to access your [facility] medical records  [id number]        Your Vitals Were     Pulse Temperature Respirations Pulse Oximetry BMI (Body Mass Index)       71 97.8  F (36.6  C) 16 97% 24.89 kg/m2        Blood Pressure from Last 3 Encounters:   08/06/18 103/66   07/30/18 95/62   07/23/18 92/62    Weight from Last 3 Encounters:   08/06/18 67.9 kg (149 lb 9.6 oz)   07/30/18 67.8 kg (149 lb 8 oz)   07/23/18 68.8 kg (151 lb 11.2 oz)              We Performed the Following     CBC with platelets differential     Comprehensive metabolic panel        Primary Care Provider Office Phone # Fax #    Solange Mcgill [phone] [address] [address]        Equal Access to Services     CHASE DE GUZMAN : Hadii aad ku hadasho Soomaali, waaxda luqadaha, qaybta kaalmada adeegyada, waxjean carlos gerardo . So [facility] [phone].    ATENCIÓN: Si habla español, tiene a quiros disposición servicios gratuitos de asistencia lingüística. Llame al [phone].    We comply with applicable federal civil rights laws and [place] laws. We do not discriminate on the basis of race, color, national origin, age, disability, sex, sexual orientation, or gender identity.            Thank you!     Thank you for choosing [facility]  for your care. Our goal is always to provide you with excellent care. Hearing back from our patients is one way we can continue to improve our services. Please take a few minutes to complete the written survey that you may receive in the mail after your visit with us. Thank you!             Your Updated Medication List - Protect others around you: Learn how to safely use, store and throw away your medicines at www.disposemymeds.org.          This list is accurate as of 8/6/18 10:21 AM.  Always use your  "most recent med list.                   Brand Name Dispense Instructions for use Diagnosis    acetaminophen 500 MG tablet    TYLENOL    100 tablet    Take 2 tablets (1,000 mg) by mouth every 8 hours    Acute post-operative pain       amylase-lipase-protease 40705-62801 units Cpep per EC capsule    CREON    450 capsule    Take 2-3 with meals / 1-2 with snacks, up to 15 per day.    Necrotizing pancreatitis       BD insulin syringe ultrafine 31G X 5/16\" 0.3 ML   Generic drug:  insulin syringe-needle U-100           blood glucose monitoring test strip    ONETOUCH VERIO IQ    400 each    Use to test blood sugar 4 times daily or as directed.    Type 2 diabetes mellitus with diabetic polyneuropathy, with long-term current use of insulin (H)       capecitabine 500 MG tablet CHEMO    XELODA    84 tablet    Take 2 tablets (1,000 mg) by mouth 2 times daily for 21 days Days 1 through 21, then 7 days off. Take within 30 mins after a meal.    Pancreatic adenocarcinoma (H)       docusate sodium 100 MG capsule    COLACE    60 capsule    Take 1 capsule (100 mg) by mouth daily    Other constipation       insulin  UNIT/ML injection    HumuLIN N/NovoLIN N    3 vial    Inject 10 units twice daily on infusion days, 5 units twice daily on all other days.    Type 2 diabetes mellitus with diabetic polyneuropathy, with long-term current use of insulin (H)       insulin pen needle 32G X 4 MM    BD DOTTY U/F    100 each    Use 2 daily as directed.    Hyperglycemia, Malignant neoplasm of pancreas, unspecified location of malignancy (H)       insulin syringes (disposable) U-100 0.3 ML Misc     1 each    Inject 10 units twice daily on infusion days, 5 units twice daily all other days    Type 2 diabetes mellitus with diabetic polyneuropathy, with long-term current use of insulin (H)       lidocaine (viscous) 2 % solution    XYLOCAINE    100 mL    Take 15 mLs by mouth every 2 hours as needed for moderate pain swish and spit every 3-8 hours as " needed; max 8 doses/24 hour period    Mucositis due to chemotherapy, Pancreatic adenocarcinoma (H)       LORazepam 0.5 MG tablet    ATIVAN    30 tablet    Take 1 tablet (0.5 mg) by mouth every 4 hours as needed (Anxiety, Nausea/Vomiting or Sleep)    Pancreatic adenocarcinoma (H)       magic mouthwash suspension (diphenhydrAMINE, lidocaine, aluminum-magnesium & simethicone) Susp compounding kit     237 mL    Swish and swallow 5-10 mLs in mouth every 6 hours as needed for mouth sores    Mouth sores       nystatin 587250 UNIT/ML suspension    MYCOSTATIN    280 mL    Take 5 mLs (500,000 Units) by mouth 4 times daily Swish and swallow 4 times daily    Mouth sores       ondansetron 8 MG tablet    ZOFRAN    30 tablet    Take 1 tablet (8 mg) by mouth every 8 hours as needed for nausea    Pancreatic adenocarcinoma (H)       ONETOUCH DELICA LANCETS 33G Misc     100 each    4 lancets daily    Type 2 diabetes mellitus with diabetic polyneuropathy, with long-term current use of insulin (H)       oxyCODONE 5 MG/5ML solution    ROXICODONE    473 mL    Take 5-10 mLs (5-10 mg) by mouth every 6 hours as needed for severe pain    Mucositis due to chemotherapy, Pancreatic adenocarcinoma (H)       pantoprazole 40 MG EC tablet    PROTONIX    90 tablet    Take 1 tablet (40 mg) by mouth every morning    Pancreatic adenocarcinoma (H)       polyethylene glycol Packet    MIRALAX/GLYCOLAX    30 packet    Take 17 g by mouth daily    Drug-induced constipation       prochlorperazine 10 MG tablet    COMPAZINE    30 tablet    Take 1 tablet (10 mg) by mouth every 6 hours as needed (Nausea/Vomiting)    Pancreatic adenocarcinoma (H)

## 2018-08-06 NOTE — TELEPHONE ENCOUNTER
Oral Chemotherapy Monitoring Program (Left Voicemail)      Primary Oncologist: Dr. Monk  Primary Oncology Clinic: [facility]  Cancer Diagnosis: Pancreatic     Attempted to contact patient today for follow up regarding oral chemotherapy, Xeloda, for normal assessment. No answer. Left voicemail for patient to please call me back at [phone] when able. No patient or medication names were mentioned while leaving this message.    Marlene Amaya   Pharmacy Intern  [facility]   [phone]

## 2018-08-07 ENCOUNTER — TELEPHONE (OUTPATIENT)
Dept: ONCOLOGY | Facility: CLINIC | Age: 58
End: 2018-08-07

## 2018-08-07 NOTE — ORAL ONC MGMT
"Oral Chemotherapy Monitoring Program    Primary Oncologist: Dr. Monk  Primary Oncology Clinic: [facility]  Cancer Diagnosis: Pancreatic Cancer    Drug: Xeloda 1000 mg (2 x 500 mg) BID for 3 weeks, then 1 week off   Start Date: C1D1=5/30/18  Last Cycle Start Date: C3D1=7/30/18    Drug Interaction Assessment: Xeloda + PPI = potential for decreased effectiveness Monitor.  Xeloda + Zofran = increased risk for QTcP  Compazine + Lorazepam = CNS Depressants may enhance the adverse/toxic effect of other CNS Depressants    Lab Monitoring Plan  C1D1+   CMP, CBC   C1D8+ Call   C1D15+    Monitoring plan for the 5 days per week x 5-6 weeks with XRT:   C1D1+   CMP, CBC   C1D8+ Call   Monitoring plan for Q3 week cycle:  CBC Q3 weeks  CMP Q3 weeks     Subjective/Objective:  Kitty Bangura is a 57 year old female contacted by phone for a follow-up visit for oral chemotherapy.  Kitty is tolerating Xeloda therapy well at this time. She denies vomiting and hand or mouth sores. Kitty stated that her severe mucositis from the previous cycle has resolved. She reports that she has not had any mucositis symptoms since it cleared up during the off week of her last cycle. Kitty reports that she continues to be proactive with her baking soda rinses 5 times per day, which she described as \"soothing.\" Kitty stated that her constipation is well-controlled with once daily Miralax. She said that taking a partial dose of Miralax every day is a good balance to prevent constipation, but to avoid diarrhea. Kitty reported that she forgot to take the Miralax the other day and had 3 loose stools on 8/6 as a result. We discussed that not taking the Miralax doesn't generally cause diarrhea, but she expressed that the disruption in her routine could be the cause of upset stomach. She said the diarrhea resolved on its own today. She also noted that she had experienced some mild stomach pain and some nausea during her walk before the diarrhea, " "with the sensation of a \"bile taste, like right before throwing up.\" Kitty said she also suspects her side effects are due to her infusion yesterday. Kitty stated that compazine and ativan help control the nausea, and a bowel movement helped relieve the stomach pain. She also reports that she has a headache within a 24 hour window after infusions, but she is able to control it with Tylenol. Kitty regularly drinks 6-10 glasses of water per day and goes on walks up to 2-3 miles daily. We also discussed what to do if she misses a dose. She knows to call our pharmacy if any of her side effects worsen. She thanked me for the call.    ORAL CHEMOTHERAPY 5/16/2018 7/2/2018 8/7/2018   Drug Name Xeloda (Capecitabine) Xeloda (Capecitabine) Xeloda (Capecitabine)   Current Dosage 1500mg 1500mg 1000mg   Current Schedule BID BID BID   Cycle Details 3 weeks on 1 week off 3 weeks on 1 week off 3 weeks on 1 week off   Start Date of Last Cycle - 6/26/2018 7/30/2018   Planned next cycle start date 5/30/2018 - 8/27/2018   Doses missed in last 2 weeks - 0 0   Adherence Assessment - Adherent Adherent   Adverse Effects - Nausea Diarrhea   Nausea - Grade 1 -   Pharmacist Intervention(nausea) - No -   Diarrhea - - Grade 1   Pharmacist Intervention(diarrhea) - - Yes   Intervention(s) - - Patient education   Home BPs - - not needed   Any new drug interactions? - No No   Is the dose as ordered appropriate for the patient? - Yes Yes     Vitals:  BP:   BP Readings from Last 1 Encounters:   08/06/18 103/66     Wt Readings from Last 1 Encounters:   08/06/18 67.9 kg (149 lb 9.6 oz)     Estimated body surface area is 1.76 meters squared as calculated from the following:    Height as of 7/23/18: 1.651 m (5' 5\").    Weight as of 8/6/18: 67.9 kg (149 lb 9.6 oz).    Labs:  _  Result Component Current Result Ref Range   Sodium 137 (8/6/2018) 133 - 144 mmol/L     _  Result Component Current Result Ref Range   Potassium 4.3 (8/6/2018) 3.4 - 5.3 mmol/L "     _  Result Component Current Result Ref Range   Calcium 8.8 (8/6/2018) 8.5 - 10.1 mg/dL     _  Result Component Current Result Ref Range   Magnesium 2.3 (7/16/2018) 1.6 - 2.3 mg/dL     _  Result Component Current Result Ref Range   Phosphorus 3.7 (7/16/2018) 2.5 - 4.5 mg/dL     _  Result Component Current Result Ref Range   Albumin 3.3 (L) (8/6/2018) 3.4 - 5.0 g/dL     _  Result Component Current Result Ref Range   Urea Nitrogen 20 (8/6/2018) 7 - 30 mg/dL     _  Result Component Current Result Ref Range   Creatinine 0.61 (8/6/2018) 0.52 - 1.04 mg/dL       _  Result Component Current Result Ref Range   AST 27 (8/6/2018) 0 - 45 U/L     _  Result Component Current Result Ref Range   ALT 24 (8/6/2018) 0 - 50 U/L     _  Result Component Current Result Ref Range   Bilirubin Total 0.5 (8/6/2018) 0.2 - 1.3 mg/dL       _  Result Component Current Result Ref Range   WBC 6.3 (8/6/2018) 4.0 - 11.0 10e9/L     _  Result Component Current Result Ref Range   Hemoglobin 11.8 (8/6/2018) 11.7 - 15.7 g/dL     _  Result Component Current Result Ref Range   Platelet Count 401 (8/6/2018) 150 - 450 10e9/L     _  Result Component Current Result Ref Range   Absolute Neutrophil 2.4 (8/6/2018) 1.6 - 8.3 10e9/L     Assessment:  Patient it tolerating therapy well at this time, with well-controlled side effects on or soon after infusion dates.    Plan:  Continue taking Miralax daily to control constipation/diarrhea. Continue baking soda rinses 5 times daily to prevent mucositis. Take compazine and ativan as needed for nausea.    Follow-Up:  Review 8/28 appointment with Kellie and saul Mendoza  Pharmacy Intern  [facility]  [phone]

## 2018-08-07 NOTE — ORAL ONC MGMT
Oral Chemotherapy Monitoring Program    Placed call to patient in follow up of Xeloda therapy.    Left message to please call back in follow up of therapy. No patient or drug names were mentioned.    Linda Mendoza  Pharmacy Intern  [facility]  [phone]

## 2018-08-13 ENCOUNTER — INFUSION THERAPY VISIT (OUTPATIENT)
Dept: ONCOLOGY | Facility: CLINIC | Age: 58
End: 2018-08-13
Attending: INTERNAL MEDICINE
Payer: COMMERCIAL

## 2018-08-13 ENCOUNTER — APPOINTMENT (OUTPATIENT)
Dept: LAB | Facility: CLINIC | Age: 58
End: 2018-08-13
Attending: PHYSICIAN ASSISTANT
Payer: COMMERCIAL

## 2018-08-13 VITALS
TEMPERATURE: 98.7 F | OXYGEN SATURATION: 98 % | BODY MASS INDEX: 24.94 KG/M2 | RESPIRATION RATE: 22 BRPM | HEART RATE: 74 BPM | WEIGHT: 149.9 LBS | SYSTOLIC BLOOD PRESSURE: 97 MMHG | DIASTOLIC BLOOD PRESSURE: 62 MMHG

## 2018-08-13 DIAGNOSIS — C25.9 PANCREATIC ADENOCARCINOMA (H): Primary | ICD-10-CM

## 2018-08-13 LAB
BASOPHILS # BLD AUTO: 0.1 10E9/L (ref 0–0.2)
BASOPHILS NFR BLD AUTO: 0.7 %
DIFFERENTIAL METHOD BLD: ABNORMAL
EOSINOPHIL # BLD AUTO: 0.1 10E9/L (ref 0–0.7)
EOSINOPHIL NFR BLD AUTO: 1.3 %
ERYTHROCYTE [DISTWIDTH] IN BLOOD BY AUTOMATED COUNT: 18.3 % (ref 10–15)
HCT VFR BLD AUTO: 32.3 % (ref 35–47)
HGB BLD-MCNC: 11.1 G/DL (ref 11.7–15.7)
IMM GRANULOCYTES # BLD: 0 10E9/L (ref 0–0.4)
IMM GRANULOCYTES NFR BLD: 0.1 %
LYMPHOCYTES # BLD AUTO: 3.3 10E9/L (ref 0.8–5.3)
LYMPHOCYTES NFR BLD AUTO: 46.6 %
MCH RBC QN AUTO: 31.3 PG (ref 26.5–33)
MCHC RBC AUTO-ENTMCNC: 34.4 G/DL (ref 31.5–36.5)
MCV RBC AUTO: 91 FL (ref 78–100)
MONOCYTES # BLD AUTO: 1 10E9/L (ref 0–1.3)
MONOCYTES NFR BLD AUTO: 14.1 %
NEUTROPHILS # BLD AUTO: 2.7 10E9/L (ref 1.6–8.3)
NEUTROPHILS NFR BLD AUTO: 37.2 %
NRBC # BLD AUTO: 0.1 10*3/UL
NRBC BLD AUTO-RTO: 2 /100
PLATELET # BLD AUTO: 176 10E9/L (ref 150–450)
RBC # BLD AUTO: 3.55 10E12/L (ref 3.8–5.2)
WBC # BLD AUTO: 7.2 10E9/L (ref 4–11)

## 2018-08-13 PROCEDURE — 96367 TX/PROPH/DG ADDL SEQ IV INF: CPT

## 2018-08-13 PROCEDURE — 25000128 H RX IP 250 OP 636: Mod: ZF | Performed by: PHYSICIAN ASSISTANT

## 2018-08-13 PROCEDURE — 25000128 H RX IP 250 OP 636: Mod: ZF | Performed by: INTERNAL MEDICINE

## 2018-08-13 PROCEDURE — 96413 CHEMO IV INFUSION 1 HR: CPT

## 2018-08-13 PROCEDURE — 85025 COMPLETE CBC W/AUTO DIFF WBC: CPT | Performed by: PHYSICIAN ASSISTANT

## 2018-08-13 RX ORDER — HEPARIN SODIUM (PORCINE) LOCK FLUSH IV SOLN 100 UNIT/ML 100 UNIT/ML
5 SOLUTION INTRAVENOUS EVERY 8 HOURS
Status: DISCONTINUED | OUTPATIENT
Start: 2018-08-13 | End: 2018-08-13 | Stop reason: HOSPADM

## 2018-08-13 RX ORDER — HEPARIN SODIUM (PORCINE) LOCK FLUSH IV SOLN 100 UNIT/ML 100 UNIT/ML
5 SOLUTION INTRAVENOUS ONCE
Status: COMPLETED | OUTPATIENT
Start: 2018-08-13 | End: 2018-08-13

## 2018-08-13 RX ADMIN — GEMCITABINE 1800 MG: 38 INJECTION INTRAVENOUS at 14:04

## 2018-08-13 RX ADMIN — SODIUM CHLORIDE, PRESERVATIVE FREE 5 ML: 5 INJECTION INTRAVENOUS at 14:36

## 2018-08-13 RX ADMIN — SODIUM CHLORIDE, PRESERVATIVE FREE 5 ML: 5 INJECTION INTRAVENOUS at 12:58

## 2018-08-13 RX ADMIN — SODIUM CHLORIDE 250 ML: 9 INJECTION, SOLUTION INTRAVENOUS at 13:40

## 2018-08-13 RX ADMIN — DEXAMETHASONE SODIUM PHOSPHATE 8 MG: 10 INJECTION, SOLUTION INTRAMUSCULAR; INTRAVENOUS at 13:43

## 2018-08-13 ASSESSMENT — PAIN SCALES - GENERAL: PAINLEVEL: NO PAIN (0)

## 2018-08-13 NOTE — PROGRESS NOTES
Infusion Nursing Note:  Kitty Bangura presents today for C3D15 Gemzar.    Patient seen by provider today: No   present during visit today: Not Applicable.    Note: Patient arrived in the infusion. States still having alternating constipation and diarrhea, same as previous. Denies chest and abdominal discomfort. Denies fever/chills nor any signs of infection. Otherwise well. No complaints made.     Intravenous Access:  Implanted Port.    Treatment Conditions:  Lab Results   Component Value Date    HGB 11.1 08/13/2018     Lab Results   Component Value Date    WBC 7.2 08/13/2018      Lab Results   Component Value Date    ANEU 2.7 08/13/2018     Lab Results   Component Value Date     08/13/2018      Lab Results   Component Value Date     08/06/2018                   Lab Results   Component Value Date    POTASSIUM 4.3 08/06/2018           Lab Results   Component Value Date    MAG 2.3 07/16/2018            Lab Results   Component Value Date    CR 0.61 08/06/2018                   Lab Results   Component Value Date    ILIANA 8.8 08/06/2018                Lab Results   Component Value Date    BILITOTAL 0.5 08/06/2018           Lab Results   Component Value Date    ALBUMIN 3.3 08/06/2018                    Lab Results   Component Value Date    ALT 24 08/06/2018           Lab Results   Component Value Date    AST 27 08/06/2018       Results reviewed, labs MET treatment parameters, ok to proceed with treatment.      Post Infusion Assessment:  Patient tolerated infusion without incident.  Blood return noted pre and post infusion.  Site patent and intact, free from redness, edema or discomfort.  No evidence of extravasations.  Access discontinued per protocol.    Discharge Plan:   Patient declined prescription refills. States that has enough supply of Xeloda.  Discharge instructions reviewed with: Patient and Family.  Patient and/or family verbalized understanding of discharge instructions and all  questions answered.  AVS to patient via Perkle.  Patient will return 8/27/18 for next appointment.   Patient discharged in stable condition accompanied by: self and .  Departure Mode: Ambulatory.    PAUL HEBERT RN

## 2018-08-13 NOTE — MR AVS SNAPSHOT
After Visit Summary   8/13/2018    Kitty Bangura    MRN: [id number]           Patient Information     Date Of Birth          1960        Visit Information        Provider Department      8/13/2018 1:30 PM  28 ATC;  ONCOLOGY INFUSION [facility]        Today's Diagnoses     Pancreatic adenocarcinoma (H)    -  1      Care Instructions    Contact Numbers  [facility]: [phone]    After Hours:  [phone]  Triage: [phone]    Please call the [facility] Triage line if you experience a temperature greater than or equal to 100.5, shaking chills, have uncontrolled nausea, vomiting and/or diarrhea, dizziness, shortness of breath, chest pain, bleeding, unexplained bruising, or if you have any other new/concerning symptoms, questions or concerns.     If it is after hours, weekends, or holidays, please call the main hospital  at  [phone] and ask to speak to the Oncology doctor on call.     If you are having any concerning symptoms or wish to speak to a provider before your next infusion visit, please call your care coordinator or triage to notify them so we can adequately serve you.     If you need a refill on a narcotic prescription or other medication, please call triage before your infusion appointment.           August 2018 Sunday Monday Tuesday Wednesday Thursday Friday Saturday                  1     2     3     4       5     6     [facility]    8:30 AM   (15 min.)    [facility] DRAW   [facility] Lab Draw     [facility] ONC INFUSION 60    9:00 AM   (60 min.)    ONCOLOGY INFUSION   [facility] 7     8     9     10     11       12     13     [facility]    1:00 PM   (15 min.)   [facility]   [facility] Lab Draw     [facility] ONC INFUSION 60    1:30 PM   (60 min.)    ONCOLOGY INFUSION   [facility] 14     15     16     17     18       19     20     21     22     23     24      25       26     27     28     [facility]    8:00 AM   (15 min.)   [facility]   [place] [facility]     [facility] RETURN    8:25 AM   (50 min.)   Kellie Nobles PA-C   [facility]     [facility] ONC INFUSION 60    9:30 AM   (60 min.)   [facility] ONCOLOGY INFUSION   [facility] 29     30 31 September 2018 Sunday Monday Tuesday Wednesday Thursday Friday Saturday                                 1       2     3     4     [facility]    9:00 AM   (15 min.)   [facility]   [place] [facility]     [facility] ONC INFUSION 60    9:30 AM   (60 min.)   UC ONCOLOGY INFUSION   [facility] 5     6     7     8       9     10     11     [facility] D[facility]    9:00 AM   (15 min.)   [facility]   [place] [facility]     [facility] ONC INFUSION 60    9:30 AM   (60 min.)    ONCOLOGY INFUSION   [facility] 12     13     14     15       16     17     CT CHEST/ABDOMEN/PELVIS W    9:15 AM   (30 min.)   [address] 18     19     20     21     22       23     24     [facility]    7:30 AM   (15 min.)   [facility]   [place] [facility]     [facility] RETURN    8:00 AM   (30 min.)   Jovany Monk MD   [facility] 25     26     27     28     29       30                                                Lab Results:  Recent Results (from the past 12 hour(s))   CBC with platelets differential    Collection Time: 08/13/18 12:58 PM   Result Value Ref Range    WBC 7.2 4.0 - 11.0 10e9/L    RBC Count 3.55 (L) 3.8 - 5.2 10e12/L    Hemoglobin 11.1 (L) 11.7 - 15.7 g/dL    Hematocrit 32.3 (L) 35.0 - 47.0 %    MCV 91 78 - 100 fl    MCH 31.3 26.5 - 33.0 pg    MCHC 34.4 31.5 - 36.5 g/dL    RDW 18.3 (H) 10.0 - 15.0 %    Platelet Count 176 150 - 450 10e9/L    Diff Method Automated Method     % Neutrophils 37.2 %    % Lymphocytes 46.6 %    % Monocytes 14.1 %    % Eosinophils 1.3  %    % Basophils 0.7 %    % Immature Granulocytes 0.1 %    Nucleated RBCs 2 (H) 0 /100    Absolute Neutrophil 2.7 1.6 - 8.3 10e9/L    Absolute Lymphocytes 3.3 0.8 - 5.3 10e9/L    Absolute Monocytes 1.0 0.0 - 1.3 10e9/L    Absolute Eosinophils 0.1 0.0 - 0.7 10e9/L    Absolute Basophils 0.1 0.0 - 0.2 10e9/L    Abs Immature Granulocytes 0.0 0 - 0.4 10e9/L    Absolute Nucleated RBC 0.1                Follow-ups after your visit        Your next 10 appointments already scheduled     Aug 28, 2018  8:00 AM CDT   [facility] with [facility]   [facility] ([facility])    [address] [place] [address]  [address]  [facility] [address]   [phone]            Aug 28, 2018  8:40 AM CDT   (Arrive by 8:25 AM)   Return Visit with Kellie Nobles PA-C   [facility] ([facility])    [address] [place] [address]  [address]  [facility] [address]   [phone]            Aug 28, 2018  9:30 AM CDT   Infusion 60 with UC ONCOLOGY INFUSION, UC 22 ATC   [facility] ([facility])    [address]  [address]  [facility] [address]   [phone]            Sep 04, 2018  9:00 AM CDT   [facility] Lab Draw with [facility] DRAW   [facility] ([facility])    [address]  [address]  [facility] [address]   [phone]            Sep 04, 2018  9:30 AM CDT   Infusion 60 with UC ONCOLOGY INFUSION, UC 22 ATC   [facility] ([facility])    [address] [place] [address]  [address]  [facility] [address]   [phone]            Sep 11, 2018  9:00 AM CDT   [facility] Draw with [facility] DRAW   [facility] ([facility])    [address] [place] [address]  [address]  [facility] [address]   [phone]            Sep 11, 2018  9:30 AM CDT   Infusion 60 with UC ONCOLOGY INFUSION,  UC 21 ATC   [facility] ([facility])    [address]  [address]  [facility] [address] [phone]              Who to contact     If you have questions or need follow up information about today's clinic visit or your schedule please contact [facility] directly at [phone].  Normal or non-critical lab and imaging results will be communicated to you by MyChart, letter or phone within 4 business days after the clinic has received the results. If you do not hear from us within 7 days, please contact the clinic through [employer]it[employer] or phone. If you have a critical or abnormal lab result, we will notify you by phone as soon as possible.  Submit refill requests through Songkick or call your pharmacy and they will forward the refill request to us. Please allow 3 business days for your refill to be completed.          Additional Information About Your Visit        [employer] Information     [employer] gives you secure access to your electronic health record. If you see a primary care provider, you can also send messages to your care team and make appointments. If you have questions, please call your primary care clinic.  If you do not have a primary care provider, please call [phone] and they will assist you.        Care EveryWhere ID     This is your Care EveryWhere ID. This could be used by other organizations to access your [employer] medical records  [id number]        Your Vitals Were     Pulse Temperature Respirations Pulse Oximetry BMI (Body Mass Index)       74 98.7  F (37.1  C) (Oral) 22 98% 24.94 kg/m2        Blood Pressure from Last 3 Encounters:   08/13/18 97/62   08/06/18 103/66   07/30/18 95/62    Weight from Last 3 Encounters:   08/13/18 68 kg (149 lb 14.4 oz)   08/06/18 67.9 kg (149 lb 9.6 oz)   07/30/18 67.8 kg (149 lb 8 oz)              We Performed the Following     CBC with platelets differential        Primary Care Provider  "Office Phone # Fax #    Deejayshari Julito Mcgill [phone] [address] [address]        Equal Access to Services     CHASE DE GUZMAN : Tre guerrero giuseppeo Sopaulo, wajosephineda luqadaha, qaybta kaalmada marty, re juniorin hayaan marycheng huertas humaira yip. So [facility] [phone].    ATENCIÓN: Si habla español, tiene a quiros disposición servicios gratuitos de asistencia lingüística. Llame al [phone].    We comply with applicable federal civil rights laws and [place] laws. We do not discriminate on the basis of race, color, national origin, age, disability, sex, sexual orientation, or gender identity.            Thank you!     Thank you for choosing [facility]  for your care. Our goal is always to provide you with excellent care. Hearing back from our patients is one way we can continue to improve our services. Please take a few minutes to complete the written survey that you may receive in the mail after your visit with us. Thank you!             Your Updated Medication List - Protect others around you: Learn how to safely use, store and throw away your medicines at www.disposemymeds.org.          This list is accurate as of 8/13/18  2:42 PM.  Always use your most recent med list.                   Brand Name Dispense Instructions for use Diagnosis    acetaminophen 500 MG tablet    TYLENOL    100 tablet    Take 2 tablets (1,000 mg) by mouth every 8 hours    Acute post-operative pain       amylase-lipase-protease 43652-20477 units Cpep per EC capsule    CREON    450 capsule    Take 2-3 with meals / 1-2 with snacks, up to 15 per day.    Necrotizing pancreatitis       BD insulin syringe ultrafine 31G X 5/16\" 0.3 ML   Generic drug:  insulin syringe-needle U-100           blood glucose monitoring test strip    ONETOUCH VERIO IQ    400 each    Use to test blood sugar 4 times daily or as directed.    Type 2 diabetes mellitus with diabetic polyneuropathy, with long-term " current use of insulin (H)       capecitabine 500 MG tablet CHEMO    XELODA    84 tablet    Take 2 tablets (1,000 mg) by mouth 2 times daily for 21 days Days 1 through 21, then 7 days off. Take within 30 mins after a meal.    Pancreatic adenocarcinoma (H)       docusate sodium 100 MG capsule    COLACE    60 capsule    Take 1 capsule (100 mg) by mouth daily    Other constipation       insulin  UNIT/ML injection    HumuLIN N/NovoLIN N    3 vial    Inject 10 units twice daily on infusion days, 5 units twice daily on all other days.    Type 2 diabetes mellitus with diabetic polyneuropathy, with long-term current use of insulin (H)       insulin pen needle 32G X 4 MM    BD DOTTY U/F    100 each    Use 2 daily as directed.    Hyperglycemia, Malignant neoplasm of pancreas, unspecified location of malignancy (H)       insulin syringes (disposable) U-100 0.3 ML Misc     1 each    Inject 10 units twice daily on infusion days, 5 units twice daily all other days    Type 2 diabetes mellitus with diabetic polyneuropathy, with long-term current use of insulin (H)       lidocaine (viscous) 2 % solution    XYLOCAINE    100 mL    Take 15 mLs by mouth every 2 hours as needed for moderate pain swish and spit every 3-8 hours as needed; max 8 doses/24 hour period    Mucositis due to chemotherapy, Pancreatic adenocarcinoma (H)       LORazepam 0.5 MG tablet    ATIVAN    30 tablet    Take 1 tablet (0.5 mg) by mouth every 4 hours as needed (Anxiety, Nausea/Vomiting or Sleep)    Pancreatic adenocarcinoma (H)       magic mouthwash suspension (diphenhydrAMINE, lidocaine, aluminum-magnesium & simethicone) Susp compounding kit     237 mL    Swish and swallow 5-10 mLs in mouth every 6 hours as needed for mouth sores    Mouth sores       nystatin 061434 UNIT/ML suspension    MYCOSTATIN    280 mL    Take 5 mLs (500,000 Units) by mouth 4 times daily Swish and swallow 4 times daily    Mouth sores       ondansetron 8 MG tablet    ZOFRAN    30  tablet    Take 1 tablet (8 mg) by mouth every 8 hours as needed for nausea    Pancreatic adenocarcinoma (H)       ONETOUCH DELICA LANCETS 33G Misc     100 each    4 lancets daily    Type 2 diabetes mellitus with diabetic polyneuropathy, with long-term current use of insulin (H)       oxyCODONE 5 MG/5ML solution    ROXICODONE    473 mL    Take 5-10 mLs (5-10 mg) by mouth every 6 hours as needed for severe pain    Mucositis due to chemotherapy, Pancreatic adenocarcinoma (H)       pantoprazole 40 MG EC tablet    PROTONIX    90 tablet    Take 1 tablet (40 mg) by mouth every morning    Pancreatic adenocarcinoma (H)       polyethylene glycol Packet    MIRALAX/GLYCOLAX    30 packet    Take 17 g by mouth daily    Drug-induced constipation       prochlorperazine 10 MG tablet    COMPAZINE    30 tablet    Take 1 tablet (10 mg) by mouth every 6 hours as needed (Nausea/Vomiting)    Pancreatic adenocarcinoma (H)

## 2018-08-13 NOTE — PATIENT INSTRUCTIONS
Contact Numbers  [facility]: [phone]    After Hours:  [phone]  Triage: [phone]    Please call the [facility] Triage line if you experience a temperature greater than or equal to 100.5, shaking chills, have uncontrolled nausea, vomiting and/or diarrhea, dizziness, shortness of breath, chest pain, bleeding, unexplained bruising, or if you have any other new/concerning symptoms, questions or concerns.     If it is after hours, weekends, or holidays, please call the main hospital  at  [phone] and ask to speak to the Oncology doctor on call.     If you are having any concerning symptoms or wish to speak to a provider before your next infusion visit, please call your care coordinator or triage to notify them so we can adequately serve you.     If you need a refill on a narcotic prescription or other medication, please call triage before your infusion appointment.           August 2018 Sunday Monday Tuesday Wednesday Thursday Friday Saturday                  1     2     3     4       5     6     [facility] DRAW    8:30 AM   (15 min.)    [facility] DRAW   [facility] Lab Draw     P ONC INFUSION 60    9:00 AM   (60 min.)    ONCOLOGY INFUSION   [facility] 7     8     9     10     11       12     13     P [facility] DRAW    1:00 PM   (15 min.)    [facility] DRAW   [facility] Lab Draw     P ONC INFUSION 60    1:30 PM   (60 min.)    ONCOLOGY INFUSION   [facility] 14     15     16     17     18       19     20     21     22     23     24     25       26     27     28     [facility] DRAW    8:00 AM   (15 min.)    [facility] DRAW   [facility] Lab Draw     [facility] RETURN    8:25 AM   (50 min.)   Kellie Nobles PA-C   [facility]     [facility] ONC INFUSION 60    9:30 AM   (60 min.)    ONCOLOGY INFUSION   [facility] 29 30 31 September 2018    Sunday Monday Tuesday Wednesday Thursday Friday Saturday                                 1       2     3     4     [facility]    9:00 AM   (15 min.)    [facility] DRAW   [facility] Draw     [facility] ONC INFUSION 60    9:30 AM   (60 min.)    ONCOLOGY INFUSION   [facility] 5     6     7     8       9     10     11     [facility] DRAW    9:00 AM   (15 min.)    [facility] DRAW   [facility] Draw     [facility] ONC INFUSION 60    9:30 AM   (60 min.)    ONCOLOGY INFUSION   [facility] 12     13     14     15       16     17     CT CHEST/ABDOMEN/PELVIS W    9:15 AM   (30 min.)   [facility]   [facility] CT 18     19     20     21     22       23     24     [facility] DRAW    7:30 AM   (15 min.)    [facility]     [facility] RETURN    8:00 AM   (30 min.)   Jovany Monk MD   [facility] 25     26     27     28     29       30                                                Lab Results:  Recent Results (from the past 12 hour(s))   CBC with platelets differential    Collection Time: 08/13/18 12:58 PM   Result Value Ref Range    WBC 7.2 4.0 - 11.0 10e9/L    RBC Count 3.55 (L) 3.8 - 5.2 10e12/L    Hemoglobin 11.1 (L) 11.7 - 15.7 g/dL    Hematocrit 32.3 (L) 35.0 - 47.0 %    MCV 91 78 - 100 fl    MCH 31.3 26.5 - 33.0 pg    MCHC 34.4 31.5 - 36.5 g/dL    RDW 18.3 (H) 10.0 - 15.0 %    Platelet Count 176 150 - 450 10e9/L    Diff Method Automated Method     % Neutrophils 37.2 %    % Lymphocytes 46.6 %    % Monocytes 14.1 %    % Eosinophils 1.3 %    % Basophils 0.7 %    % Immature Granulocytes 0.1 %    Nucleated RBCs 2 (H) 0 /100    Absolute Neutrophil 2.7 1.6 - 8.3 10e9/L    Absolute Lymphocytes 3.3 0.8 - 5.3 10e9/L    Absolute Monocytes 1.0 0.0 - 1.3 10e9/L    Absolute Eosinophils 0.1 0.0 - 0.7 10e9/L    Absolute Basophils 0.1 0.0 - 0.2 10e9/L    Abs Immature Granulocytes 0.0 0 - 0.4 10e9/L    Absolute Nucleated  RBC 0.1

## 2018-08-20 DIAGNOSIS — Z79.4 TYPE 2 DIABETES MELLITUS WITH DIABETIC POLYNEUROPATHY, WITH LONG-TERM CURRENT USE OF INSULIN (H): ICD-10-CM

## 2018-08-20 DIAGNOSIS — E11.42 TYPE 2 DIABETES MELLITUS WITH DIABETIC POLYNEUROPATHY, WITH LONG-TERM CURRENT USE OF INSULIN (H): ICD-10-CM

## 2018-08-23 NOTE — PROGRESS NOTES
Endocrinology and Diabetes Clinic Initial Visit  Date of Service: June 5, 2018    Consulting provider: Kellie Nobles PA-C  [address]  [place] [address]    Reason for consultation: diabetes    HPI:   Patient is a 57 year old woman with a history of pancreatic cancer now s/p distal pancreatectomy with elevated blood sugars. Hgb A1c was 7.8% on 5/31/2018 and was 5.5 on 4/17/2018. She initially was diagnosed in 12/2017. Underwent neoadjugant chemo with FOLFIRINOX, then surgery 4/18/2018, and is now starting Xeloda and gemcititabine. It appears that there is no evidence of recurrent disease as of 5/22/2018. She started chemotherapy with Xeloda and gemcititabine on 5/31/2018 and is receiving 12 mg dexamethasone IV with each treatment. Plan is 4 cycles of treatment (3 weeks on, 1 week off x4).     She reports increased thirst and urination over the past couple of months. No burning with urination. She has a history of prediabetes and was on metformin years ago. Has some tingling in the tips of her toes but no numbness. No history of retinopathy, heart disease, kidney problems, or non-healing skin lesions. Her entire family on her mom's side have diabetes. Denies alcohol or tobacco use.     She has never used a meter or insulin before.     She is a  on leave. Lives in [place], [place] with her . Has one son.     Review of Systems:  A 10-point ROS is otherwise negative except as noted in HPI or below.     Current Problem List:   Patient Active Problem List   Diagnosis     Acute pancreatitis     Leukocytosis     Fatty liver     Pancreatic mass     Pancreatitis     Necrotizing pancreatitis     Pancreatic adenocarcinoma (H)     Dehydration     Pancreas cancer (H)     Pancreatic cancer (H)     Hypernatremia     Hypotension     Anemia     Thrombocytopenia (H)     Hyperglycemia       Past Medical and Past Surgical History:  Past Medical History:   Diagnosis Date     Necrotizing  Nursing notes reviewed and accepted.    Roderick Tracey is a 4 year old female who presents for well child exam.  Patient presents with Mother.    Diet/feeding: Doesn't like much milk.  No vitamin . Otherwise patient eats and drinks well overall  Elimination Patterns: Denies any problems  Sleep: Sleeping well.  Social/School: Starting first grade at [employer]    Concerns raised today include: Patient had abnormal ultrasound with possible accessory spleen and recommendations by radiologist was to have repeat in 6 months.  Mom concerned about frequent complaints of urinary urgency. Has no incontinence. Mom believes not having any constipation issues  Mother's last concern also is that feels patient gets sick more often than other children her age.  Review of records show that patient has only been seen at [facility] for a UTI and sinus infection. Mother states that patient has been seen at outside clinic for few illnesses including strep throat.  Mother wondering about possible immune disorder    Medical history, surgical history, and family history reviewed and updated.    PHYSICAL EXAM:  Blood pressure 106/72, pulse 116, temperature 98.4 °F (36.9 °C), temperature source Temporal Artery, resp. rate 24, height 3' 7.75\" (1.111 m), weight 22.7 kg.  GENERAL:  Well appearing female, nontoxic, no acute distress.  Alert and interactive.  SKIN: Warm, normal turgor.  No cyanosis.  No bruises or lesions.  HEAD:  Normocephalic, atraumatic.    EYES:  Conjunctivae without injection or icterus.  Pupils equal, round, reactive to light; extraocular movements intact.  NOSE: No flaring.  EARS:  Tympanic membranes transparent with good landmarks.  THROAT:  Oropharynx with moist mucous membranes and no lesions.  NECK:  Supple, no lymphadenopathy or masses.  HEART:  Regular rate and rhythm.  Quiet precordium.  Normal S1, S2.  No murmurs, rubs, gallops.   LUNGS:  Clear to auscultation bilaterally.  No wheezes, rales, rhonchi.  Normal work of  breathing.  ABDOMEN:  Soft, nontender.  No organomegaly. Palpable hard stools at the left and right lower quadrant  GENITOURINARY:  Colby 1, No hernias present, No vaginal discharge present and Normal vaginal appearance without labial adhesions      EXTREMITIES:  Warm, dry, without abnormalities.  NEUROLOGIC:  Normal tone, bulk, strength.    ASSESSMENT:  4 year old female well child.   Chronic constipation likely secondary to complaints of urinary frequency  Concerns by mother for patient being frequently sick    PLAN:  1. Recommend obtaining a KUB to evaluate for significant constipation at then we'll call mom and discuss a stooling regimen for patient  2. Recommend referral to immunology  3. Repeat abdominal ultrasound  4. Recommend optometry evaluation    All parental concerns and questions discussed.  Anticipatory guidance provided, handout given.              Safety/car/bicycle/fire/sharp objects/falls/water              Development              Discipline              Diet              Television              Analgesics/antipyretics              Sun exposure              Tobacco-free home              Dental care                            Immunizations per orders.  Risks, benefits, and side effects discussed.  Return to clinic in 1 year for well child examination or sooner prn illness/concerns.                   pancreatitis      Pancreatic cancer (H)      PONV (postoperative nausea and vomiting)        Past Surgical History:   Procedure Laterality Date     ABDOMEN SURGERY  1983    Ruptured tubal pregnancies, additional surgeries followed     BACK SURGERY  2004    L5, S1 discectomy     BREAST SURGERY  2003    Breast reduction     COLONOSCOPY  2008     ENDOSCOPIC RETROGRADE CHOLANGIOPANCREATOGRAM N/A 1/25/2018    Procedure: COMBINED ENDOSCOPIC RETROGRADE CHOLANGIOPANCREATOGRAPHY, PLACE TUBE/STENT;  Endoscopic retrograde cholangiopancreatogram with stent placement and cyst drainage bile ;  Surgeon: Vito Sanches MD;  Location: [facility] OR     ENDOSCOPIC ULTRASOUND LOWER GASTROINTESTIONAL TRACT (GI) N/A 1/25/2018    Procedure: ENDOSCOPIC ULTRASOUND LOWER GASTROINTESTIONAL TRACT (GI);  Endoscopic Ultrasound with guided Cyst-Gastrostomy;  Surgeon: González Metz MD;  Location: [facility] OR     ENDOSCOPIC ULTRASOUND, ESOPHAGOSCOPY, GASTROSCOPY, DUODENOSCOPY (EGD), NECROSECTOMY N/A 12/4/2017    Procedure: ENDOSCOPIC ULTRASOUND, ESOPHAGOSCOPY, GASTROSCOPY, DUODENOSCOPY (EGD), NECROSECTOMY;  Esophagogastroduodenoscopy, with  Necrosectomy and stents replacement  ;  Surgeon: González Metz MD;  Location: [facility] OR     ENDOSCOPIC ULTRASOUND, ESOPHAGOSCOPY, GASTROSCOPY, DUODENOSCOPY (EGD), NECROSECTOMY N/A 12/12/2017    Procedure: ENDOSCOPIC ULTRASOUND, ESOPHAGOSCOPY, GASTROSCOPY, DUODENOSCOPY (EGD), NECROSECTOMY;  Esophagogastroduodenoscopy with Necrosectomy, Balloon Dilation, stent removal X3 and Cystgastrostomy stent Placement X2;  Surgeon: Guru Cecilia Staples MD;  Location: [facility] OR     ESOPHAGOSCOPY, GASTROSCOPY, DUODENOSCOPY (EGD), COMBINED N/A 11/29/2017    Procedure: COMBINED ENDOSCOPIC ULTRASOUND, ESOPHAGOSCOPY, GASTROSCOPY, DUODENOSCOPY (EGD), FINE NEEDLE ASPIRATE/BIOPSY;  Endoscopic Ultrasound with cystgastrostomy and fine needle aspiration ;  Surgeon: González Metz MD;  Location: [facility] OR      ESOPHAGOSCOPY, GASTROSCOPY, DUODENOSCOPY (EGD), COMBINED N/A 2018    Procedure: COMBINED ESOPHAGOSCOPY, GASTROSCOPY, DUODENOSCOPY (EGD);  EGD;  Surgeon: González Metz MD;  Location:  GI     ESOPHAGOSCOPY, GASTROSCOPY, DUODENOSCOPY (EGD), COMBINED N/A 2018    Procedure: COMBINED ESOPHAGOSCOPY, GASTROSCOPY, DUODENOSCOPY (EGD), REMOVE FOREIGN BODY;;  Surgeon: González Metz MD;  Location:  GI     GYN SURGERY  1983    Multiple ectopic pregnancies, reconnect,       INSERT PORT VASCULAR ACCESS Right 2018    Procedure: INSERT PORT VASCULAR ACCESS;  Chest Port Placement - right;  Surgeon: Chanda Lawson PA-C;  [facility]: [facility] OR     LAPAROSCOPY DIAGNOSTIC (GENERAL) N/A 2018    Procedure: LAPAROSCOPY DIAGNOSTIC (GENERAL);  Diagnostic Laparoscopy; Open Distal Pancreatectomy And Splenectomy, splenic flexure mobilization, cholecystectomy, intraoperative ultrasound;  Surgeon: Ja Byrd MD;  Location: [facility] OR     PANCREATECTOMY, SPLENECTOMY N/A 2018    Procedure: PANCREATECTOMY, SPLENECTOMY;;  Surgeon: Ja Byrd MD;  Location:  OR       Medications:   Current Outpatient Prescriptions   Medication Sig Dispense Refill     acetaminophen (TYLENOL) 500 MG tablet Take 2 tablets (1,000 mg) by mouth every 8 hours (Patient not taking: Reported on 2018) 100 tablet 1     amylase-lipase-protease (CREON) 21675-86656 UNITS CPEP per EC capsule Take 2-3 with meals / 1-2 with snacks, up to 15 per day. 450 capsule 6     capecitabine (XELODA) 500 MG tablet CHEMO Take 3 tablets (1,500 mg) by mouth 2 times daily for 21 days Days 1 through 21, then 7 days off. Take within 30 mins after a meal. 126 tablet 0     docusate sodium (COLACE) 100 MG capsule Take 1 capsule (100 mg) by mouth daily 60 capsule 1     LORazepam (ATIVAN) 0.5 MG tablet Take 1 tablet (0.5 mg) by mouth every 4 hours as needed (Anxiety, Nausea/Vomiting or Sleep) (Patient not taking: Reported on 2018) 30 tablet 2      ondansetron (ZOFRAN) 8 MG tablet Take 1 tablet (8 mg) by mouth every 8 hours as needed for nausea 30 tablet 0     pantoprazole (PROTONIX) 40 MG EC tablet Take 1 tablet (40 mg) by mouth every morning (Patient not taking: Reported on 5/30/2018) 30 tablet 0     polyethylene glycol (MIRALAX/GLYCOLAX) Packet Take 17 g by mouth daily (Patient not taking: Reported on 6/5/2018) 30 packet 0     prochlorperazine (COMPAZINE) 10 MG tablet Take 1 tablet (10 mg) by mouth every 6 hours as needed (Nausea/Vomiting) (Patient not taking: Reported on 6/5/2018) 30 tablet 2       Allergies:   No Known Allergies    Social History:  Social History   Substance Use Topics     Smoking status: Former Smoker     Packs/day: 0.50     Years: 4.00     Types: Cigarettes     Start date: 1/1/1974     Quit date: 1981     Smokeless tobacco: Never Used     Alcohol use No      Comment: Now quit since Oct 31.  Moderate drinker in past       Family History:  Family History   Problem Relation Age of Onset     HEART DISEASE Father      Hyperlipidemia Father      HEART DISEASE Brother      DIABETES Mother      Hypertension Mother      Obesity Mother      DIABETES Maternal Grandfather      Hypertension Maternal Grandfather      Obesity Maternal Grandfather      DIABETES Sister      Hypertension Sister      Depression Sister      Anxiety Disorder Sister      Obesity Sister      Hypertension Brother      Hyperlipidemia Brother      Breast Cancer Cousin      Breast Cancer Cousin      Breast Cancer Cousin      Colon Cancer Other      Other Cancer Other      Mesothelioma     Depression Sister      Anxiety Disorder Brother      Anxiety Disorder Son      MENTAL ILLNESS Sister      Schizophrenia     Obesity Maternal Grandmother      Obesity Sister        Physical Examination:  Blood pressure 104/73, pulse 76, weight 149 lb 4 oz (67.7 kg), not currently breastfeeding.  Body mass index is 24.84 kg/(m^2).  Wt Readings from Last 4 Encounters:   06/12/18 147 lb 14.4 oz  (67.1 kg)   06/05/18 149 lb 4 oz (67.7 kg)   06/05/18 149 lb 3.2 oz (67.7 kg)   05/30/18 152 lb (68.9 kg)     GEN: generally, well appearing.  HEENT: EOMI.  NECK: Thyroid non-palpable, non-tender. No cervical or clavicular LAD.   CV: RRR with no murmur.   RESP: CTA bilaterally.   ABD: Soft, non-tender, and non-distended, with normal BS.   EXT: No peripheral edema.   SKIN: Normal skin temperature and turgor with no lesions.   NEURO: Non-focal.    Labs and Studies:   Component      Latest Ref Rng & Units 5/30/2018 6/5/2018   WBC      4.0 - 11.0 10e9/L  4.4   RBC Count      3.8 - 5.2 10e12/L  4.13   Hemoglobin      11.7 - 15.7 g/dL  12.6   Hematocrit      35.0 - 47.0 %  38.8   MCV      78 - 100 fl  94   MCH      26.5 - 33.0 pg  30.5   MCHC      31.5 - 36.5 g/dL  32.5   RDW      10.0 - 15.0 %  12.8   Platelet Count      150 - 450 10e9/L  320   Diff Method        Automated Method   % Neutrophils      %  41.3   % Lymphocytes      %  47.6   % Monocytes      %  5.5   % Eosinophils      %  4.3   % Basophils      %  1.1   % Immature Granulocytes      %  0.2   Nucleated RBCs      0 /100  1 (H)   Absolute Neutrophil      1.6 - 8.3 10e9/L  1.8   Absolute Lymphocytes      0.8 - 5.3 10e9/L  2.1   Absolute Monocytes      0.0 - 1.3 10e9/L  0.2   Absolute Eosinophils      0.0 - 0.7 10e9/L  0.2   Absolute Basophils      0.0 - 0.2 10e9/L  0.1   Abs Immature Granulocytes      0 - 0.4 10e9/L  0.0   Absolute Nucleated RBC        0.0   Hemoglobin A1C      0 - 5.6 % 7.8 (H)        Assessment:    Type 2 Diabetes and steroid induced hyperglycemia - she now has overt diabetes 2/2 to steroids, partial pancreatectomy, and family history. She will be receiving large doses of dexamethasone 12 mg IV with each chemotherapy infusion. Using 0.3 u/kg for high dose steroids in calculating NPH dose, we will start with NPH 10 units BID on infusion days but reduce the dose to 5 units BID on non-infusion days. She will see a PA soon and also Dr. Es Gallo  again on 7/9/2018. We discussed symptoms of hypoglycemia and how to treat with 15 g carbs and recheck in 15 minutes. She will let us know if she has any sugars <70 or if she is consistently having blood sugars <100. She will MyChart message Dr. Gallo in 1 week with her blood sugars.     Plan from patient instruction given to patient:   1. Take NPH 10 units twice daily on infusion days, 5 units on all other days.  *note: she may need NPH 10 BID for 1-2 additional days after infusion but will wait for more glucose data  2. On days of your infusion, check your blood sugar 4 times - fasting, before meals, and at bedtime.  3. On off days, check your sugar every morning.  4. If blood sugar is <70, take 15 g carbs and retest in 15 minutes.  5. If you are regularly getting blood sugars <100, please let Dr. Es Gallo know via [facility] or call the clinic.  6. Return to clinic for diabetes educator appointment when able.  7. Return to clinic for for follow-up with a physician assistant. (next available appointment)    8. We will add you to Dr. Gallo's schedule for follow-up on July 9th at noon.     Patient was seen and examined with attending physician, Dr. Es Gallo.    Jhony Godfrey MD   Endocrinology Fellow  Pager: [phone]        Endocrine Staff Note    The patient was seen and examined by me with Dr. Godfrey. His note details our mutual findings and plan.    Es Gallo MD  Endocrinology and Diabetes   [phone]

## 2018-08-28 ENCOUNTER — INFUSION THERAPY VISIT (OUTPATIENT)
Dept: ONCOLOGY | Facility: CLINIC | Age: 58
End: 2018-08-28
Attending: INTERNAL MEDICINE
Payer: COMMERCIAL

## 2018-08-28 ENCOUNTER — ONCOLOGY VISIT (OUTPATIENT)
Dept: ONCOLOGY | Facility: CLINIC | Age: 58
End: 2018-08-28
Attending: PHYSICIAN ASSISTANT
Payer: COMMERCIAL

## 2018-08-28 ENCOUNTER — APPOINTMENT (OUTPATIENT)
Dept: LAB | Facility: CLINIC | Age: 58
End: 2018-08-28
Attending: PHYSICIAN ASSISTANT
Payer: COMMERCIAL

## 2018-08-28 VITALS
OXYGEN SATURATION: 98 % | SYSTOLIC BLOOD PRESSURE: 94 MMHG | RESPIRATION RATE: 16 BRPM | HEART RATE: 69 BPM | HEIGHT: 65 IN | WEIGHT: 149.7 LBS | BODY MASS INDEX: 24.94 KG/M2 | DIASTOLIC BLOOD PRESSURE: 55 MMHG | TEMPERATURE: 97.8 F

## 2018-08-28 DIAGNOSIS — C25.9 PANCREATIC ADENOCARCINOMA (H): Primary | ICD-10-CM

## 2018-08-28 LAB
ALBUMIN SERPL-MCNC: 3.2 G/DL (ref 3.4–5)
ALP SERPL-CCNC: 105 U/L (ref 40–150)
ALT SERPL W P-5'-P-CCNC: 24 U/L (ref 0–50)
ANION GAP SERPL CALCULATED.3IONS-SCNC: 5 MMOL/L (ref 3–14)
AST SERPL W P-5'-P-CCNC: 24 U/L (ref 0–45)
BASOPHILS # BLD AUTO: 0.1 10E9/L (ref 0–0.2)
BASOPHILS NFR BLD AUTO: 0.7 %
BILIRUB SERPL-MCNC: 0.6 MG/DL (ref 0.2–1.3)
BUN SERPL-MCNC: 15 MG/DL (ref 7–30)
CALCIUM SERPL-MCNC: 8.6 MG/DL (ref 8.5–10.1)
CHLORIDE SERPL-SCNC: 108 MMOL/L (ref 94–109)
CO2 SERPL-SCNC: 27 MMOL/L (ref 20–32)
CREAT SERPL-MCNC: 0.62 MG/DL (ref 0.52–1.04)
DIFFERENTIAL METHOD BLD: ABNORMAL
EOSINOPHIL # BLD AUTO: 0.3 10E9/L (ref 0–0.7)
EOSINOPHIL NFR BLD AUTO: 3.9 %
ERYTHROCYTE [DISTWIDTH] IN BLOOD BY AUTOMATED COUNT: 22.5 % (ref 10–15)
GFR SERPL CREATININE-BSD FRML MDRD: >90 ML/MIN/1.7M2
GLUCOSE SERPL-MCNC: 142 MG/DL (ref 70–99)
HCT VFR BLD AUTO: 34 % (ref 35–47)
HGB BLD-MCNC: 11.1 G/DL (ref 11.7–15.7)
IMM GRANULOCYTES # BLD: 0 10E9/L (ref 0–0.4)
IMM GRANULOCYTES NFR BLD: 0.2 %
LYMPHOCYTES # BLD AUTO: 2.2 10E9/L (ref 0.8–5.3)
LYMPHOCYTES NFR BLD AUTO: 26 %
MCH RBC QN AUTO: 31.4 PG (ref 26.5–33)
MCHC RBC AUTO-ENTMCNC: 32.6 G/DL (ref 31.5–36.5)
MCV RBC AUTO: 96 FL (ref 78–100)
MONOCYTES # BLD AUTO: 1.8 10E9/L (ref 0–1.3)
MONOCYTES NFR BLD AUTO: 20.9 %
NEUTROPHILS # BLD AUTO: 4.1 10E9/L (ref 1.6–8.3)
NEUTROPHILS NFR BLD AUTO: 48.3 %
NRBC # BLD AUTO: 0 10*3/UL
NRBC BLD AUTO-RTO: 0 /100
PLATELET # BLD AUTO: 632 10E9/L (ref 150–450)
PLATELET # BLD EST: ABNORMAL 10*3/UL
POTASSIUM SERPL-SCNC: 4.1 MMOL/L (ref 3.4–5.3)
PROT SERPL-MCNC: 7 G/DL (ref 6.8–8.8)
RBC # BLD AUTO: 3.53 10E12/L (ref 3.8–5.2)
SODIUM SERPL-SCNC: 140 MMOL/L (ref 133–144)
WBC # BLD AUTO: 8.6 10E9/L (ref 4–11)

## 2018-08-28 PROCEDURE — 96367 TX/PROPH/DG ADDL SEQ IV INF: CPT

## 2018-08-28 PROCEDURE — 25000128 H RX IP 250 OP 636: Mod: ZF | Performed by: PHYSICIAN ASSISTANT

## 2018-08-28 PROCEDURE — 80053 COMPREHEN METABOLIC PANEL: CPT | Performed by: PHYSICIAN ASSISTANT

## 2018-08-28 PROCEDURE — 96413 CHEMO IV INFUSION 1 HR: CPT

## 2018-08-28 PROCEDURE — 85025 COMPLETE CBC W/AUTO DIFF WBC: CPT | Performed by: PHYSICIAN ASSISTANT

## 2018-08-28 PROCEDURE — 99214 OFFICE O/P EST MOD 30 MIN: CPT | Mod: ZP | Performed by: PHYSICIAN ASSISTANT

## 2018-08-28 RX ORDER — HEPARIN SODIUM (PORCINE) LOCK FLUSH IV SOLN 100 UNIT/ML 100 UNIT/ML
5 SOLUTION INTRAVENOUS ONCE
Status: CANCELLED
Start: 2018-09-04 | End: 2018-09-04

## 2018-08-28 RX ORDER — EPINEPHRINE 0.3 MG/.3ML
0.3 INJECTION SUBCUTANEOUS EVERY 5 MIN PRN
Status: CANCELLED | OUTPATIENT
Start: 2018-09-04

## 2018-08-28 RX ORDER — LORAZEPAM 2 MG/ML
0.5 INJECTION INTRAMUSCULAR EVERY 4 HOURS PRN
Status: CANCELLED
Start: 2018-09-04

## 2018-08-28 RX ORDER — DIPHENHYDRAMINE HYDROCHLORIDE 50 MG/ML
50 INJECTION INTRAMUSCULAR; INTRAVENOUS
Status: CANCELLED
Start: 2018-09-11

## 2018-08-28 RX ORDER — ALBUTEROL SULFATE 90 UG/1
1-2 AEROSOL, METERED RESPIRATORY (INHALATION)
Status: CANCELLED
Start: 2018-08-28

## 2018-08-28 RX ORDER — CAPECITABINE 500 MG/1
1000 TABLET, FILM COATED ORAL 2 TIMES DAILY
Qty: 22 TABLET | Refills: 0 | Status: SHIPPED | OUTPATIENT
Start: 2018-08-28 | End: 2018-12-07

## 2018-08-28 RX ORDER — HEPARIN SODIUM (PORCINE) LOCK FLUSH IV SOLN 100 UNIT/ML 100 UNIT/ML
5 SOLUTION INTRAVENOUS ONCE
Status: COMPLETED | OUTPATIENT
Start: 2018-08-28 | End: 2018-08-28

## 2018-08-28 RX ORDER — ALBUTEROL SULFATE 90 UG/1
1-2 AEROSOL, METERED RESPIRATORY (INHALATION)
Status: CANCELLED
Start: 2018-09-04

## 2018-08-28 RX ORDER — MEPERIDINE HYDROCHLORIDE 25 MG/ML
25 INJECTION INTRAMUSCULAR; INTRAVENOUS; SUBCUTANEOUS EVERY 30 MIN PRN
Status: CANCELLED | OUTPATIENT
Start: 2018-09-11

## 2018-08-28 RX ORDER — DIPHENHYDRAMINE HYDROCHLORIDE 50 MG/ML
50 INJECTION INTRAMUSCULAR; INTRAVENOUS
Status: CANCELLED
Start: 2018-08-28

## 2018-08-28 RX ORDER — EPINEPHRINE 1 MG/ML
0.3 INJECTION, SOLUTION INTRAMUSCULAR; SUBCUTANEOUS EVERY 5 MIN PRN
Status: CANCELLED | OUTPATIENT
Start: 2018-09-11

## 2018-08-28 RX ORDER — ALBUTEROL SULFATE 0.83 MG/ML
2.5 SOLUTION RESPIRATORY (INHALATION)
Status: CANCELLED | OUTPATIENT
Start: 2018-08-28

## 2018-08-28 RX ORDER — ALBUTEROL SULFATE 0.83 MG/ML
2.5 SOLUTION RESPIRATORY (INHALATION)
Status: CANCELLED | OUTPATIENT
Start: 2018-09-11

## 2018-08-28 RX ORDER — LORAZEPAM 2 MG/ML
0.5 INJECTION INTRAMUSCULAR EVERY 4 HOURS PRN
Status: CANCELLED
Start: 2018-09-11

## 2018-08-28 RX ORDER — SODIUM CHLORIDE 9 MG/ML
1000 INJECTION, SOLUTION INTRAVENOUS CONTINUOUS PRN
Status: CANCELLED
Start: 2018-08-28

## 2018-08-28 RX ORDER — DIPHENHYDRAMINE HYDROCHLORIDE 50 MG/ML
50 INJECTION INTRAMUSCULAR; INTRAVENOUS
Status: CANCELLED
Start: 2018-09-04

## 2018-08-28 RX ORDER — HEPARIN SODIUM (PORCINE) LOCK FLUSH IV SOLN 100 UNIT/ML 100 UNIT/ML
5 SOLUTION INTRAVENOUS ONCE
Status: CANCELLED
Start: 2018-08-28 | End: 2018-08-28

## 2018-08-28 RX ORDER — METHYLPREDNISOLONE SODIUM SUCCINATE 125 MG/2ML
125 INJECTION, POWDER, LYOPHILIZED, FOR SOLUTION INTRAMUSCULAR; INTRAVENOUS
Status: CANCELLED
Start: 2018-08-28

## 2018-08-28 RX ORDER — ALBUTEROL SULFATE 90 UG/1
1-2 AEROSOL, METERED RESPIRATORY (INHALATION)
Status: CANCELLED
Start: 2018-09-11

## 2018-08-28 RX ORDER — MEPERIDINE HYDROCHLORIDE 25 MG/ML
25 INJECTION INTRAMUSCULAR; INTRAVENOUS; SUBCUTANEOUS EVERY 30 MIN PRN
Status: CANCELLED | OUTPATIENT
Start: 2018-09-04

## 2018-08-28 RX ORDER — PANTOPRAZOLE SODIUM 40 MG/1
40 TABLET, DELAYED RELEASE ORAL EVERY MORNING
Qty: 30 TABLET | Refills: 0 | Status: SHIPPED | OUTPATIENT
Start: 2018-08-28 | End: 2018-12-07

## 2018-08-28 RX ORDER — SODIUM CHLORIDE 9 MG/ML
1000 INJECTION, SOLUTION INTRAVENOUS CONTINUOUS PRN
Status: CANCELLED
Start: 2018-09-11

## 2018-08-28 RX ORDER — EPINEPHRINE 0.3 MG/.3ML
0.3 INJECTION SUBCUTANEOUS EVERY 5 MIN PRN
Status: CANCELLED | OUTPATIENT
Start: 2018-09-11

## 2018-08-28 RX ORDER — SODIUM CHLORIDE 9 MG/ML
1000 INJECTION, SOLUTION INTRAVENOUS CONTINUOUS PRN
Status: CANCELLED
Start: 2018-09-04

## 2018-08-28 RX ORDER — EPINEPHRINE 1 MG/ML
0.3 INJECTION, SOLUTION INTRAMUSCULAR; SUBCUTANEOUS EVERY 5 MIN PRN
Status: CANCELLED | OUTPATIENT
Start: 2018-09-04

## 2018-08-28 RX ORDER — LORAZEPAM 2 MG/ML
0.5 INJECTION INTRAMUSCULAR EVERY 4 HOURS PRN
Status: CANCELLED
Start: 2018-08-28

## 2018-08-28 RX ORDER — MEPERIDINE HYDROCHLORIDE 25 MG/ML
25 INJECTION INTRAMUSCULAR; INTRAVENOUS; SUBCUTANEOUS EVERY 30 MIN PRN
Status: CANCELLED | OUTPATIENT
Start: 2018-08-28

## 2018-08-28 RX ORDER — METHYLPREDNISOLONE SODIUM SUCCINATE 125 MG/2ML
125 INJECTION, POWDER, LYOPHILIZED, FOR SOLUTION INTRAMUSCULAR; INTRAVENOUS
Status: CANCELLED
Start: 2018-09-11

## 2018-08-28 RX ORDER — HEPARIN SODIUM (PORCINE) LOCK FLUSH IV SOLN 100 UNIT/ML 100 UNIT/ML
5 SOLUTION INTRAVENOUS ONCE
Status: CANCELLED
Start: 2018-09-11 | End: 2018-09-11

## 2018-08-28 RX ORDER — EPINEPHRINE 1 MG/ML
0.3 INJECTION, SOLUTION INTRAMUSCULAR; SUBCUTANEOUS EVERY 5 MIN PRN
Status: CANCELLED | OUTPATIENT
Start: 2018-08-28

## 2018-08-28 RX ORDER — ALBUTEROL SULFATE 0.83 MG/ML
2.5 SOLUTION RESPIRATORY (INHALATION)
Status: CANCELLED | OUTPATIENT
Start: 2018-09-04

## 2018-08-28 RX ORDER — EPINEPHRINE 0.3 MG/.3ML
0.3 INJECTION SUBCUTANEOUS EVERY 5 MIN PRN
Status: CANCELLED | OUTPATIENT
Start: 2018-08-28

## 2018-08-28 RX ORDER — METHYLPREDNISOLONE SODIUM SUCCINATE 125 MG/2ML
125 INJECTION, POWDER, LYOPHILIZED, FOR SOLUTION INTRAMUSCULAR; INTRAVENOUS
Status: CANCELLED
Start: 2018-09-04

## 2018-08-28 RX ADMIN — SODIUM CHLORIDE, PRESERVATIVE FREE 5 ML: 5 INJECTION INTRAVENOUS at 08:20

## 2018-08-28 RX ADMIN — GEMCITABINE 1800 MG: 38 INJECTION INTRAVENOUS at 09:37

## 2018-08-28 RX ADMIN — SODIUM CHLORIDE 250 ML: 9 INJECTION, SOLUTION INTRAVENOUS at 09:17

## 2018-08-28 RX ADMIN — SODIUM CHLORIDE, PRESERVATIVE FREE 5 ML: 5 INJECTION INTRAVENOUS at 10:10

## 2018-08-28 RX ADMIN — DEXAMETHASONE SODIUM PHOSPHATE 8 MG: 10 INJECTION, SOLUTION INTRAMUSCULAR; INTRAVENOUS at 09:19

## 2018-08-28 ASSESSMENT — PAIN SCALES - GENERAL: PAINLEVEL: NO PAIN (0)

## 2018-08-28 NOTE — MR AVS SNAPSHOT
After Visit Summary   8/28/2018    Kitty Bangura    MRN: [id number]           Patient Information     Date Of Birth          1960        Visit Information        Provider Department      8/28/2018 8:40 AM Kellie Nobles PA-C [facility]        Today's Diagnoses     Pancreatic adenocarcinoma (H)    -  1      Care Instructions    Colorectal Cancer Screening: During our visit today, we discussed that it is recommended you receive colorectal cancer screening. Please call or make an appointment with your primary care provider to discuss this. You may also call the [facility] scheduling line ([phone]) to set up a colonoscopy appointment.            Follow-ups after your visit        Your next 10 appointments already scheduled     Sep 04, 2018  9:00 AM CDT   Masonic Lab Draw with UC MASONIC LAB DRAW   [facility] ([facility])    [address] [place] [address]  [address]  [employer] [address]   [phone]            Sep 04, 2018  9:30 AM CDT   Infusion 60 with UC ONCOLOGY INFUSION, UC 22 ATC   [facility] ([facility])    [address] [place] [address]  [address]  [employer] [address]   [phone]            Sep 11, 2018  9:00 AM CDT   Masonic Lab Draw with UC MASONIC LAB DRAW   [facility] Masonic Lab Draw ([facility])    [address] [place] [address]  [address]  [employer] [address]   [phone]            Sep 11, 2018  9:30 AM CDT   Infusion 60 with UC ONCOLOGY INFUSION, UC 21 ATC   [facility] ([facility])    [address] [place] [address]  [address]  [employer] [address]   [phone]            Sep 17, 2018  9:15 AM CDT   CT CHEST/ABDOMEN/PELVIS W CONTRAST with WYCT1   [facility] CT ([facility])    [address] [address] [phone]           Please bring any scans or X-rays taken at  other hospitals, if similar tests were done. Also bring a list of your medicines, including vitamins, minerals and over-the-counter drugs. It is safest to leave personal items at home.  Be sure to tell your doctor:   If you have any allergies.   If there s any chance you are pregnant.   If you are breastfeeding.  How to prepare:   Do not eat or drink for 2 hours before your exam. If you need to take medicine, you may take it with small sips of water. (We may ask you to take liquid medicine as well.)   Please wear loose clothing, such as a sweat suit or jogging clothes. Avoid snaps, zippers and other metal. We may ask you to undress and put on a hospital gown.  Please arrive 30 minutes early for your CT. Once in the department you might be asked to drink water 15-20 minutes prior to your exam.  If indicated you may be asked to drink an oral contrast in advance of your CT.  If this is the case, the imaging team will let you know or be in contact with you prior to your appointment  Patients over 70 or patients with diabetes or kidney problems:   If you haven t had a blood test (creatinine test) within the last 30 days, the Cardiologist/Radiologist may require you to get this test prior to your exam.  If you have diabetes:   Continue to take your metformin medication on the day of your exam  If you have any questions, please call the Imaging Department where you will have your exam.            Sep 24, 2018  7:30 AM CDT   Masonic Lab Draw with  MASONIC LAB DRAW   [facility] Draw ([facility])    [address]  [address]  [facility] [address]   [phone]            Sep 24, 2018  8:15 AM CDT   (Arrive by 8:00 AM)   Return Visit with Jovany Monk MD   [facility] ([facility])    [address]  [address]  [facility] [address]   [phone]            Oct 09, 2018  9:30 AM CDT   (Arrive by 9:15 AM)   RETURN DIABETES with  "Latricia Bustillos PA-C   [facility] ([facility])    [address]  3rd Floor  [facility] [address] [phone]              Who to contact     If you have questions or need follow up information about today's clinic visit or your schedule please contact [facility] directly at [phone].  Normal or non-critical lab and imaging results will be communicated to you by [employer], letter or phone within 4 business days after the clinic has received the results. If you do not hear from us within 7 days, please contact the clinic through [employer] or phone. If you have a critical or abnormal lab result, we will notify you by phone as soon as possible.  Submit refill requests through [employer] or call your pharmacy and they will forward the refill request to us. Please allow 3 business days for your refill to be completed.          Additional Information About Your Visit        [employer] Information     [employer] gives you secure access to your electronic health record. If you see a primary care provider, you can also send messages to your care team and make appointments. If you have questions, please call your primary care clinic.  If you do not have a primary care provider, please call [phone] and they will assist you.        Care EveryWhere ID     This is your Care EveryWhere ID. This could be used by other organizations to access your [place] medical records  [id number]        Your Vitals Were     Pulse Temperature Respirations Height Pulse Oximetry BMI (Body Mass Index)    69 97.8  F (36.6  C) (Oral) 16 1.651 m (5' 5\") 98% 24.91 kg/m2       Blood Pressure from Last 3 Encounters:   08/28/18 94/55   08/13/18 97/62   08/06/18 103/66    Weight from Last 3 Encounters:   08/28/18 67.9 kg (149 lb 11.2 oz)   08/13/18 68 kg (149 lb 14.4 oz)   08/06/18 67.9 kg (149 lb 9.6 oz)              Today, you had the following     No orders found for display       "   Today's Medication Changes          These changes are accurate as of 8/28/18  9:56 AM.  If you have any questions, ask your nurse or doctor.               These medicines have changed or have updated prescriptions.        Dose/Directions    * capecitabine 500 MG tablet CHEMO   Commonly known as:  XELODA   This may have changed:  Another medication with the same name was added. Make sure you understand how and when to take each.   Used for:  Pancreatic adenocarcinoma (H)        Dose:  1000 mg   Take 2 tablets (1,000 mg) by mouth 2 times daily for 21 days Days 1 through 21, then 7 days off. Take within 30 mins after a meal.   Quantity:  84 tablet   Refills:  0       * capecitabine 500 MG tablet CHEMO   Commonly known as:  XELODA   This may have changed:  You were already taking a medication with the same name, and this prescription was added. Make sure you understand how and when to take each.   Used for:  Pancreatic adenocarcinoma (H)        Dose:  1000 mg   Take 2 tablets (1,000 mg) by mouth 2 times daily for 21 days Days 1 through 21, then 7 days off. Take within 30 mins after a meal.   Quantity:  22 tablet   Refills:  0       * Notice:  This list has 2 medication(s) that are the same as other medications prescribed for you. Read the directions carefully, and ask your doctor or other care provider to review them with you.         Where to get your medicines      These medications were sent to [facility] [id number] [place] [address]  [address] [place] [address]    Hours:  TRANSPLANT PHONE NUMBER [phone] Phone:  [phone]     capecitabine 500 MG tablet CHEMO    pantoprazole 40 MG EC tablet                Primary Care Provider Office Phone # Fax #    Solange Mcgill [phone] [address] [facility] [address]        Equal Access to Services     CHASE DE GUZMAN AH: solange Stern,  "larisa snider maryre ramon. So [facility] [phone].    ATENCIÓN: Si odessa vasquez, tiene a quiros disposición servicios gratuitos de asistencia lingüística. Macy al [phone].    We comply with applicable federal civil rights laws and [place] laws. We do not discriminate on the basis of race, color, national origin, age, disability, sex, sexual orientation, or gender identity.            Thank you!     Thank you for choosing [facility]  for your care. Our goal is always to provide you with excellent care. Hearing back from our patients is one way we can continue to improve our services. Please take a few minutes to complete the written survey that you may receive in the mail after your visit with us. Thank you!             Your Updated Medication List - Protect others around you: Learn how to safely use, store and throw away your medicines at www.disposemymeds.org.          This list is accurate as of 8/28/18  9:56 AM.  Always use your most recent med list.                   Brand Name Dispense Instructions for use Diagnosis    acetaminophen 500 MG tablet    TYLENOL    100 tablet    Take 2 tablets (1,000 mg) by mouth every 8 hours    Acute post-operative pain       amylase-lipase-protease 01016-85122 units Cpep per EC capsule    CREON    450 capsule    Take 2-3 with meals / 1-2 with snacks, up to 15 per day.    Necrotizing pancreatitis       BD insulin syringe ultrafine 31G X 5/16\" 0.3 ML   Generic drug:  insulin syringe-needle U-100           blood glucose monitoring test strip    ONETOUCH VERIO IQ    400 each    Use to test blood sugar 4 times daily or as directed.    Type 2 diabetes mellitus with diabetic polyneuropathy, with long-term current use of insulin (H)       * capecitabine 500 MG tablet CHEMO    XELODA    84 tablet    Take 2 tablets (1,000 mg) by mouth 2 times daily for 21 days Days 1 through 21, then 7 days off. Take within 30 mins after a " meal.    Pancreatic adenocarcinoma (H)       * capecitabine 500 MG tablet CHEMO    XELODA    22 tablet    Take 2 tablets (1,000 mg) by mouth 2 times daily for 21 days Days 1 through 21, then 7 days off. Take within 30 mins after a meal.    Pancreatic adenocarcinoma (H)       docusate sodium 100 MG capsule    COLACE    60 capsule    Take 1 capsule (100 mg) by mouth daily    Other constipation       insulin  UNIT/ML injection    HumuLIN N/NovoLIN N    3 vial    Inject 10 units twice daily on infusion days, 5 units twice daily on all other days.    Type 2 diabetes mellitus with diabetic polyneuropathy, with long-term current use of insulin (H)       insulin pen needle 32G X 4 MM    BD DOTTY U/F    100 each    Use 2 daily as directed.    Hyperglycemia, Malignant neoplasm of pancreas, unspecified location of malignancy (H)       insulin syringes (disposable) U-100 0.3 ML Misc     1 each    Inject 10 units twice daily on infusion days, 5 units twice daily all other days    Type 2 diabetes mellitus with diabetic polyneuropathy, with long-term current use of insulin (H)       lidocaine (viscous) 2 % solution    XYLOCAINE    100 mL    Take 15 mLs by mouth every 2 hours as needed for moderate pain swish and spit every 3-8 hours as needed; max 8 doses/24 hour period    Mucositis due to chemotherapy, Pancreatic adenocarcinoma (H)       LORazepam 0.5 MG tablet    ATIVAN    30 tablet    Take 1 tablet (0.5 mg) by mouth every 4 hours as needed (Anxiety, Nausea/Vomiting or Sleep)    Pancreatic adenocarcinoma (H)       magic mouthwash suspension (diphenhydrAMINE, lidocaine, aluminum-magnesium & simethicone) Susp compounding kit     237 mL    Swish and swallow 5-10 mLs in mouth every 6 hours as needed for mouth sores    Mouth sores       nystatin [id number] UNIT/ML suspension    MYCOSTATIN    280 mL    Take 5 mLs (500,000 Units) by mouth 4 times daily Swish and swallow 4 times daily    Mouth sores       ondansetron 8 MG tablet     ZOFRAN    30 tablet    Take 1 tablet (8 mg) by mouth every 8 hours as needed for nausea    Pancreatic adenocarcinoma (H)       ONETOUCH DELICA LANCETS 33G Misc     100 each    4 lancets daily    Type 2 diabetes mellitus with diabetic polyneuropathy, with long-term current use of insulin (H)       oxyCODONE 5 MG/5ML solution    ROXICODONE    473 mL    Take 5-10 mLs (5-10 mg) by mouth every 6 hours as needed for severe pain    Mucositis due to chemotherapy, Pancreatic adenocarcinoma (H)       pantoprazole 40 MG EC tablet    PROTONIX    30 tablet    Take 1 tablet (40 mg) by mouth every morning    Pancreatic adenocarcinoma (H)       polyethylene glycol Packet    MIRALAX/GLYCOLAX    30 packet    Take 17 g by mouth daily    Drug-induced constipation       prochlorperazine 10 MG tablet    COMPAZINE    30 tablet    Take 1 tablet (10 mg) by mouth every 6 hours as needed (Nausea/Vomiting)    Pancreatic adenocarcinoma (H)       * Notice:  This list has 2 medication(s) that are the same as other medications prescribed for you. Read the directions carefully, and ask your doctor or other care provider to review them with you.

## 2018-08-28 NOTE — PROGRESS NOTES
Infusion Nursing Note:  Kitty Bangura presents today for C4D1 Gemzar/Xeloda.    Patient seen by provider today: Yes: Kellie HOWELL   present during visit today: Not Applicable.    Note: Patient feels well. No complaints made. Otherwise well.     Intravenous Access:  Implanted Port.    Treatment Conditions:  Lab Results   Component Value Date    HGB 11.1 08/28/2018     Lab Results   Component Value Date    WBC 8.6 08/28/2018      Lab Results   Component Value Date    ANEU 4.1 08/28/2018     Lab Results   Component Value Date     08/28/2018      Lab Results   Component Value Date     08/28/2018                   Lab Results   Component Value Date    POTASSIUM 4.1 08/28/2018           Lab Results   Component Value Date    MAG 2.3 07/16/2018            Lab Results   Component Value Date    CR 0.62 08/28/2018                   Lab Results   Component Value Date    ILIANA 8.6 08/28/2018                Lab Results   Component Value Date    BILITOTAL 0.6 08/28/2018           Lab Results   Component Value Date    ALBUMIN 3.2 08/28/2018                    Lab Results   Component Value Date    ALT 24 08/28/2018           Lab Results   Component Value Date    AST 24 08/28/2018       Results reviewed, labs MET treatment parameters, ok to proceed with treatment.      Post Infusion Assessment:  Patient tolerated infusion without incident.  Blood return noted pre and post infusion.  Site patent and intact, free from redness, edema or discomfort.  No evidence of extravasations.  Access discontinued per protocol.    Discharge Plan:   Prescription refills given for Xeloda and Protonix.  Discharge instructions reviewed with: Patient and Family.  Patient and/or family verbalized understanding of discharge instructions and all questions answered.  AVS to patient via [employer].  Patient will return 9/4/18 for next appointment.   Patient discharged in stable condition accompanied by: self and .  Departure  Mode: Ambulatory.    PAUL HEBERT, BRENDAN

## 2018-08-28 NOTE — LETTER
8/28/2018      RE: Kitty Bangura  [id number] [facility] [address]       Oncology/Hematology Visit Note  Aug 28, 2018    Reason for Visit: Follow up of resectable pancreatic cancer    History of Present Illness: Staging CT chest/abd/pelvis on 12/9/17 showed several small pulmonary nodules (largest 3mm), unchanged mass in the pancreatic tail, mildly prominent mesenteric nodes thought likely reactive, and small right hepatic lobe hypodensities. Abdominal MRI on 12/10/17 showed hepatic hemangiomas, no evidence of metastatic disease to the liver.   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     Kitty met with Dr Monk and discussed neoadjuvant chemotherapy with FOLFIRINOX, then surgery and then adjuvant chemotherapy. She was seen on 1/15/18 for cycle 1 and then hospitalized for pancreatitis on 1/20/18 where she was managed with IV fluds and pain control. GI took her to the OR to attempt pancreatic duct stent placement but unfortunately it was completely obstructed. They were able to place a pancreatic stent and perform an EUS cyst-gastrostomy with plans to repeat Xray a month following to ensure stent passage. She was d/c 1/27. Cycle 2 was given 2/5/18 (a week delayed due to hospitalization).     She went to ED on 2/11 with worsening abdominal pain and was worried about pancreatitis. W/u with CT and labs including lipase were negative. She had elevated WBC of 33K but had Neulasta on 2/8.     Cycle 3 was given on 2/20 with dose-reduction (10%) of oxaliplatin due to significant cold sensitivity and motor neuropathy. Complicated by bronchitis.    Cycle 4 was given 3/8/18. She went on to have distal pancreatotomy and splenectomy and cholecystectomy on 4/17/18. Final pathology showed complete pathologic response, 26 benign lymph nodes, and necrotizing pancreatitis.     She started adjuvant chemotherapy with Xeloda  "and gemcitabine 5/30/18. She had baseline CT on 5/22/18 showing no evidence of recurrent disease. Xeloda was dose-reduced with cycle 3 due to mucositis. Cycle 3 was also delayed a week. She returns today prior to cycle 4.     Interval History:  Mrs. Bangura returns to clinic today with her  prior to cycle 4 day 1. She is feeling much better compared to our last visit. She had no mouth sores this past cycle with dose-reduction of Xeloda. She had a little soreness with swallowing and scratchy throat the past few days but has been rinsing with salt and soda rinses. She is tired and has mild nausea on day 1 and day 2 but then recovers nicely. She will take a few antiemetics to help her eat. Has alternating diarrhea and constipation but feels she is managing her meds okay. No fevers/chills. Slight cough with scratchy throat. No HFS. She is happy this is her last cycle.     Review of Systems:  Patient denies fevers, chills, night sweats, unexplained weight changes, headaches, dizziness, vision or hearing changes, new lumps or bumps, chest pain, shortness of breath, cough, abdominal pain, nausea, vomiting, changes to bowel or bladder, swelling of extremities, bleeding issues, or rash.    Current Outpatient Prescriptions   Medication Sig Dispense Refill     acetaminophen (TYLENOL) 500 MG tablet Take 2 tablets (1,000 mg) by mouth every 8 hours (Patient not taking: Reported on 7/23/2018) 100 tablet 1     amylase-lipase-protease (CREON) 17096-46619 UNITS CPEP per EC capsule Take 2-3 with meals / 1-2 with snacks, up to 15 per day. 450 capsule 6     BD INSULIN SYRINGE ULTRAFINE 31G X 5/16\" 0.3 ML        blood glucose monitoring (ONETOUCH VERIO IQ) test strip Use to test blood sugar 4 times daily or as directed. 400 each 3     capecitabine (XELODA) 500 MG tablet CHEMO Take 2 tablets (1,000 mg) by mouth 2 times daily for 21 days Days 1 through 21, then 7 days off. Take within 30 mins after a meal. 84 tablet 0     docusate " sodium (COLACE) 100 MG capsule Take 1 capsule (100 mg) by mouth daily (Patient not taking: Reported on 8/6/2018) 60 capsule 1     insulin NPH (HUMULIN N/NOVOLIN N) 100 UNIT/ML injection Inject 10 units twice daily on infusion days, 5 units twice daily on all other days. 3 vial 3     insulin pen needle (BD DOTTY U/F) 32G X 4 MM Use 2 daily as directed. 100 each 11     insulin syringes, disposable, U-100 0.3 ML MISC Inject 10 units twice daily on infusion days, 5 units twice daily all other days 1 each 3     lidocaine, viscous, (XYLOCAINE) 2 % solution Take 15 mLs by mouth every 2 hours as needed for moderate pain swish and spit every 3-8 hours as needed; max 8 doses/24 hour period (Patient not taking: Reported on 8/6/2018) 100 mL 1     LORazepam (ATIVAN) 0.5 MG tablet Take 1 tablet (0.5 mg) by mouth every 4 hours as needed (Anxiety, Nausea/Vomiting or Sleep) 30 tablet 2     magic mouthwash (FIRST-MOUTHWASH BLM) suspension Swish and swallow 5-10 mLs in mouth every 6 hours as needed for mouth sores (Patient not taking: Reported on 8/6/2018) 237 mL 1     nystatin (MYCOSTATIN) 936334 UNIT/ML suspension Take 5 mLs (500,000 Units) by mouth 4 times daily Swish and swallow 4 times daily (Patient not taking: Reported on 7/30/2018) 280 mL 1     ondansetron (ZOFRAN) 8 MG tablet Take 1 tablet (8 mg) by mouth every 8 hours as needed for nausea 30 tablet 0     ONETOUCH DELICA LANCETS 33G MISC 4 lancets daily 100 each 3     oxyCODONE (ROXICODONE) 5 MG/5ML solution Take 5-10 mLs (5-10 mg) by mouth every 6 hours as needed for severe pain (Patient not taking: Reported on 8/6/2018) 473 mL 0     pantoprazole (PROTONIX) 40 MG EC tablet Take 1 tablet (40 mg) by mouth every morning 90 tablet 1     polyethylene glycol (MIRALAX/GLYCOLAX) Packet Take 17 g by mouth daily 30 packet 0     prochlorperazine (COMPAZINE) 10 MG tablet Take 1 tablet (10 mg) by mouth every 6 hours as needed (Nausea/Vomiting) 30 tablet 2       Physical Examination:  BP  "94/55 (BP Location: Right arm, Patient Position: Sitting, Cuff Size: Adult Regular)  Pulse 69  Temp 97.8  F (36.6  C) (Oral)  Resp 16  Ht 1.651 m (5' 5\")  Wt 67.9 kg (149 lb 11.2 oz)  SpO2 98%  BMI 24.91 kg/m2  Wt Readings from Last 10 Encounters:   08/13/18 68 kg (149 lb 14.4 oz)   08/06/18 67.9 kg (149 lb 9.6 oz)   07/30/18 67.8 kg (149 lb 8 oz)   07/23/18 68.8 kg (151 lb 11.2 oz)   07/16/18 68.3 kg (150 lb 9.6 oz)   07/09/18 71.2 kg (157 lb)   07/09/18 71.5 kg (157 lb 11.2 oz)   07/02/18 68.9 kg (151 lb 12.8 oz)   06/26/18 69.4 kg (153 lb 1.6 oz)   06/12/18 67.1 kg (147 lb 14.4 oz)     Constitutional: Well-appearing female in no acute distress.  Eyes: EOMI, PERRL. No scleral icterus.  ENT: Oral mucosa is moist without thrush. Some mucositis changes sides of buccal mucosa   Lymphatic: Neck is supple without cervical or supraclavicular lymphadenopathy.   Cardiovascular: Regular rate and rhythm. No murmurs, gallops, or rubs. No peripheral edema.  Respiratory: Clear to auscultation bilaterally. No wheezes or crackles.   Gastrointestinal: Bowel sounds present. Abdomen soft, nontender, nondistended. No palpable hepatosplenomegaly or masses.   Neurologic: Cranial nerves II through XII are grossly intact.  Skin: No rashes, petechiae, or bruising noted on exposed skin.    Laboratory Data:   8/28/2018 08:26   Sodium 140   Potassium 4.1   Chloride 108   Carbon Dioxide 27   Urea Nitrogen 15   Creatinine 0.62   GFR Estimate >90   GFR Estimate If Black >90   Calcium 8.6   Anion Gap 5   Albumin 3.2 (L)   Protein Total 7.0   Bilirubin Total 0.6   Alkaline Phosphatase 105   ALT 24   AST 24   Glucose 142 (H)   WBC 8.6   Hemoglobin 11.1 (L)   Hematocrit 34.0 (L)   Platelet Count 632 (H)   RBC Count 3.53 (L)   MCV 96   MCH 31.4   MCHC 32.6   RDW 22.5 (H)   Diff Method Automated Method   % Neutrophils 48.3   % Lymphocytes 26.0   % Monocytes 20.9   % Eosinophils 3.9   % Basophils 0.7   % Immature Granulocytes 0.2   Nucleated " RBCs 0   Absolute Neutrophil 4.1   Absolute Lymphocytes 2.2   Absolute Monocytes 1.8 (H)   Absolute Eosinophils 0.3   Absolute Basophils 0.1   Abs Immature Granulocytes 0.0   Absolute Nucleated RBC 0.0   Platelet Estimate Confirming automa...     Assessment and Plan:  1. Pancreatic cancer  S/p 4 cycles of neoadjuvant  FOLFIRINOX with good response so she was able to undergo distal pancreatectomy and splenectomy. Her pathology showed complete pathologic response. She started adjuvant gem/xeloda 3 months ago. Cycle 2 complicated by severe mucositis related to Xeloda. Cycle 3 was given with dose-reduced Xeloda. She tolerated chemo well with dose-reduction. Will proceed with cycle 4 with the same dosing. Following this cycle she will have post-therapy restaging and start active surveillance. She has follow-up with Dr. Monk at that time. She will call with any interval concerns.     2. Mucositis: Mild now. Continue salt and soda rinses BID and increase as needed.     3. DM: Secondary to prediabetes, family history, and now distal pancreatectomy. She saw endocrinology and was started on NPH with dosing for steroids. Dex dose is 8 mg with infusions.     4. Nausea: Antiemetics prn.     Kellie Nobles PA-C    [facility]  [address] [place] [id number] [phone]

## 2018-08-28 NOTE — NURSING NOTE
"Oncology Rooming Note    August 28, 2018 8:34 AM   Kitty Bangura is a 57 year old female who presents for:    Chief Complaint   Patient presents with     Port Draw     port accessed and labs drawn by rn.  vs taken.     Oncology Clinic Visit     Return Pancreatic Ca     Initial Vitals: BP 94/55 (BP Location: Right arm, Patient Position: Sitting, Cuff Size: Adult Regular)  Pulse 69  Temp 97.8  F (36.6  C) (Oral)  Resp 16  Ht 1.651 m (5' 5\")  Wt 67.9 kg (149 lb 11.2 oz)  SpO2 98%  BMI 24.91 kg/m2 Estimated body mass index is 24.91 kg/(m^2) as calculated from the following:    Height as of this encounter: 1.651 m (5' 5\").    Weight as of this encounter: 67.9 kg (149 lb 11.2 oz). Body surface area is 1.76 meters squared.  No Pain (0) Comment: Data Unavailable   No LMP recorded. Patient is postmenopausal.  Allergies reviewed: Yes  Medications reviewed: Yes    Medications: MEDICATION REFILLS NEEDED TODAY. Provider was notified.  Pharmacy name entered into Mim[facility]:    [id number] - [place], [place] - [address] [place] [address] [id number] - [place], [place] - [address] AT [facility] [address] [facility]    Clinical concerns: Refill on protonix and chemo; Colonoscopy due, see AVS.     6 minutes for nursing intake (face to face time)     Latricia Mathias [facility]              "

## 2018-08-28 NOTE — PATIENT INSTRUCTIONS
Colorectal Cancer Screening: During our visit today, we discussed that it is recommended you receive colorectal cancer screening. Please call or make an appointment with your primary care provider to discuss this. You may also call the [facility] scheduling line ([phone]) to set up a colonoscopy appointment.

## 2018-08-28 NOTE — MR AVS SNAPSHOT
After Visit Summary   8/28/2018    Kitty Bangura    MRN: [id number]           Patient Information     Date Of Birth          1960        Visit Information        Provider Department      8/28/2018 9:30 AM  22 ATC;  ONCOLOGY INFUSION [facility]        Today's Diagnoses     Pancreatic adenocarcinoma (H)    -  1      Care Instructions    Contact Numbers  [facility]: [phone]    After Hours:  [phone]  Triage: [phone]    Please call the [facility] Triage line if you experience a temperature greater than or equal to 100.5, shaking chills, have uncontrolled nausea, vomiting and/or diarrhea, dizziness, shortness of breath, chest pain, bleeding, unexplained bruising, or if you have any other new/concerning symptoms, questions or concerns.     If it is after hours, weekends, or holidays, please call the main hospital  at  [phone] and ask to speak to the Oncology doctor on call.     If you are having any concerning symptoms or wish to speak to a provider before your next infusion visit, please call your care coordinator or triage to notify them so we can adequately serve you.     If you need a refill on a narcotic prescription or other medication, please call triage before your infusion appointment.           August 2018 Sunday Monday Tuesday Wednesday Thursday Friday Saturday                  1     2     3     4       5     6     [facility]    8:30 AM   (15 min.)    [facility] DRAW   [facility] Lab Draw     [facility] ONC INFUSION 60    9:00 AM   (60 min.)    ONCOLOGY INFUSION   [facility] 7     8     9     10     11       12     13     [facility]    1:00 PM   (15 min.)   [facility]   [facility] Lab Draw     [facility] ONC INFUSION 60    1:30 PM   (60 min.)    ONCOLOGY INFUSION   [facility] 14     15     16     17     18       19     20     21     22     23     24      25       26     27     28     [facility]    8:00 AM   (15 min.)   [facility]   [facility]     [facility] RETURN    8:25 AM   (50 min.)   Kellie Nobles PA-C   [facility]     UMP ONC INFUSION 60    9:30 AM   (60 min.)   UC ONCOLOGY INFUSION   [facility] 29 30 31 September 2018 Sunday Monday Tuesday Wednesday Thursday Friday Saturday                                 1       2     3     4     [facility]    9:00 AM   (15 min.)   [facility]   [facility]     [facility] ONC INFUSION 60    9:30 AM   (60 min.)   UC ONCOLOGY INFUSION   [facility] 5     6     7     8       9     10     11     [facility]    9:00 AM   (15 min.)   [facility]   [facility]     [facility] ONC INFUSION 60    9:30 AM   (60 min.)    ONCOLOGY INFUSION   [facility] 12     13     14     15       16     17     CT CHEST/ABDOMEN/PELVIS W    9:15 AM   (30 min.)   [address] 18     19     20     21     22       23     24     [facility]    7:30 AM   (15 min.)   [facility]   [facility]     [facility] RETURN    8:00 AM   (30 min.)   Jovany Monk MD   [facility] 25     26     27     28     29       30                                                Lab Results:  Recent Results (from the past 12 hour(s))   CBC with platelets differential    Collection Time: 08/28/18  8:26 AM   Result Value Ref Range    WBC 8.6 4.0 - 11.0 10e9/L    RBC Count 3.53 (L) 3.8 - 5.2 10e12/L    Hemoglobin 11.1 (L) 11.7 - 15.7 g/dL    Hematocrit 34.0 (L) 35.0 - 47.0 %    MCV 96 78 - 100 fl    MCH 31.4 26.5 - 33.0 pg    MCHC 32.6 31.5 - 36.5 g/dL    RDW 22.5 (H) 10.0 - 15.0 %    Platelet Count 632 (H) 150 - 450 10e9/L    Diff Method Automated Method     % Neutrophils 48.3 %    % Lymphocytes 26.0 %    % Monocytes 20.9 %    % Eosinophils  3.9 %    % Basophils 0.7 %    % Immature Granulocytes 0.2 %    Nucleated RBCs 0 0 /100    Absolute Neutrophil 4.1 1.6 - 8.3 10e9/L    Absolute Lymphocytes 2.2 0.8 - 5.3 10e9/L    Absolute Monocytes 1.8 (H) 0.0 - 1.3 10e9/L    Absolute Eosinophils 0.3 0.0 - 0.7 10e9/L    Absolute Basophils 0.1 0.0 - 0.2 10e9/L    Abs Immature Granulocytes 0.0 0 - 0.4 10e9/L    Absolute Nucleated RBC 0.0     Platelet Estimate Confirming automated cell count    Comprehensive metabolic panel    Collection Time: 08/28/18  8:26 AM   Result Value Ref Range    Sodium 140 133 - 144 mmol/L    Potassium 4.1 3.4 - 5.3 mmol/L    Chloride 108 94 - 109 mmol/L    Carbon Dioxide 27 20 - 32 mmol/L    Anion Gap 5 3 - 14 mmol/L    Glucose 142 (H) 70 - 99 mg/dL    Urea Nitrogen 15 7 - 30 mg/dL    Creatinine 0.62 0.52 - 1.04 mg/dL    GFR Estimate >90 >60 mL/min/1.7m2    GFR Estimate If Black >90 >60 mL/min/1.7m2    Calcium 8.6 8.5 - 10.1 mg/dL    Bilirubin Total 0.6 0.2 - 1.3 mg/dL    Albumin 3.2 (L) 3.4 - 5.0 g/dL    Protein Total 7.0 6.8 - 8.8 g/dL    Alkaline Phosphatase 105 40 - 150 U/L    ALT 24 0 - 50 U/L    AST 24 0 - 45 U/L               Follow-ups after your visit        Your next 10 appointments already scheduled     Sep 04, 2018  9:00 AM CDT   [facility] with  [facility]   [facility])    [address] [place] [address]  [address]  [facility] [id number] [phone]            Sep 04, 2018  9:30 AM CDT   Infusion 60 with  ONCOLOGY INFUSION, UC 22 ATC   [facility] ([facility])    [address] [place] [address]  [address]  [facility] [id number] [phone]            Sep 11, 2018  9:00 AM CDT   [facility] Draw with  [facility] DRAW   [facility] Draw ([facility])    [address]  [address]  [facility] [address] [phone]            Sep 11, 2018  9:30 AM CDT   Infusion 60 with UC ONCOLOGY  INFUSION,  21 ATC   [facility] ([facility])    [address]  [address]  [facility] [address] [phone]            Sep 17, 2018  9:15 AM CDT   CT CHEST/ABDOMEN/PELVIS W CONTRAST with WYCT1   [facility] CT ([facility])    [address] [facility] [address] [phone]           Please bring any scans or X-rays taken at other hospitals, if similar tests were done. Also bring a list of your medicines, including vitamins, minerals and over-the-counter drugs. It is safest to leave personal items at home.  Be sure to tell your doctor:   If you have any allergies.   If there s any chance you are pregnant.   If you are breastfeeding.  How to prepare:   Do not eat or drink for 2 hours before your exam. If you need to take medicine, you may take it with small sips of water. (We may ask you to take liquid medicine as well.)   Please wear loose clothing, such as a sweat suit or jogging clothes. Avoid snaps, zippers and other metal. We may ask you to undress and put on a hospital gown.  Please arrive 30 minutes early for your CT. Once in the department you might be asked to drink water 15-20 minutes prior to your exam.  If indicated you may be asked to drink an oral contrast in advance of your CT.  If this is the case, the imaging team will let you know or be in contact with you prior to your appointment  Patients over 70 or patients with diabetes or kidney problems:   If you haven t had a blood test (creatinine test) within the last 30 days, the Cardiologist/Radiologist may require you to get this test prior to your exam.  If you have diabetes:   Continue to take your metformin medication on the day of your exam  If you have any questions, please call the Imaging Department where you will have your exam.            Sep 24, 2018  7:30 AM T   [facility] Draw with [facility] DRAW   [facility] Lab Draw ([facility])    [address]  [address]  [facility] [address]   [phone]            Sep 24, 2018  8:15 AM CDT   (Arrive by 8:00 AM)   Return Visit with Jovany Monk MD   [facility] ([facility])    [address]  [address]  [facility] [address]   [phone]            Oct 09, 2018  9:30 AM CDT   (Arrive by 9:15 AM)   RETURN DIABETES with Latricia Bustillos PA-C   [facility] ([facility])    [address]  3rd Floor  [facility] [address]   [phone]              Who to contact     If you have questions or need follow up information about today's clinic visit or your schedule please contact [facility] directly at [phone].  Normal or non-critical lab and imaging results will be communicated to you by [employer], letter or phone within 4 business days after the clinic has received the results. If you do not hear from us within 7 days, please contact the clinic through [employer] or phone. If you have a critical or abnormal lab result, we will notify you by phone as soon as possible.  Submit refill requests through [employer] or call your pharmacy and they will forward the refill request to us. Please allow 3 business days for your refill to be completed.          Additional Information About Your Visit        [employer] Information     [employer] gives you secure access to your electronic health record. If you see a primary care provider, you can also send messages to your care team and make appointments. If you have questions, please call your primary care clinic.  If you do not have a primary care provider, please call [phone] and they will assist you.        Care EveryWhere ID     This is your Care EveryWhere ID. This could be used by other organizations to access your [place] medical records  [id number]         Blood Pressure from Last 3 Encounters:   08/28/18 94/55    08/13/18 97/62   08/06/18 103/66    Weight from Last 3 Encounters:   08/28/18 67.9 kg (149 lb 11.2 oz)   08/13/18 68 kg (149 lb 14.4 oz)   08/06/18 67.9 kg (149 lb 9.6 oz)              We Performed the Following     CBC with platelets differential     Comprehensive metabolic panel          Today's Medication Changes          These changes are accurate as of 8/28/18 10:26 AM.  If you have any questions, ask your nurse or doctor.               These medicines have changed or have updated prescriptions.        Dose/Directions    * capecitabine 500 MG tablet CHEMO   Commonly known as:  XELODA   This may have changed:  Another medication with the same name was added. Make sure you understand how and when to take each.   Used for:  Pancreatic adenocarcinoma (H)        Dose:  1000 mg   Take 2 tablets (1,000 mg) by mouth 2 times daily for 21 days Days 1 through 21, then 7 days off. Take within 30 mins after a meal.   Quantity:  84 tablet   Refills:  0       * capecitabine 500 MG tablet CHEMO   Commonly known as:  XELODA   This may have changed:  You were already taking a medication with the same name, and this prescription was added. Make sure you understand how and when to take each.   Used for:  Pancreatic adenocarcinoma (H)        Dose:  1000 mg   Take 2 tablets (1,000 mg) by mouth 2 times daily for 21 days Days 1 through 21, then 7 days off. Take within 30 mins after a meal.   Quantity:  22 tablet   Refills:  0       * Notice:  This list has 2 medication(s) that are the same as other medications prescribed for you. Read the directions carefully, and ask your doctor or other care provider to review them with you.         Where to get your medicines      These medications were sent to [place], [place] - [address] [place] [address] [address] [address]    Hours:  TRANSPLANT PHONE NUMBER [phone] Phone:  [phone]     capecitabine 500 MG tablet CHEMO  "   pantoprazole 40 MG EC tablet                Primary Care Provider Office Phone # Fax #    Deejayshari Julito Mcgill [phone] [address] [address]        Equal Access to Services     CHASE DE GUZMAN : Hadjuanita zaheer guerrero giuseppeo Sotrinaali, waaxda luqadaha, qaybta kaalmada adeginada, re juniorin hayaawilliams hernandezcheng huertas humaira yip. So [facility] [phone].    ATENCIÓN: Si habla español, tiene a quiros disposición servicios gratuitos de asistencia lingüística. Llame al [phone].    We comply with applicable federal civil rights laws and [place] laws. We do not discriminate on the basis of race, color, national origin, age, disability, sex, sexual orientation, or gender identity.            Thank you!     Thank you for choosing [facility]  for your care. Our goal is always to provide you with excellent care. Hearing back from our patients is one way we can continue to improve our services. Please take a few minutes to complete the written survey that you may receive in the mail after your visit with us. Thank you!             Your Updated Medication List - Protect others around you: Learn how to safely use, store and throw away your medicines at www.disposemymeds.org.          This list is accurate as of 8/28/18 10:26 AM.  Always use your most recent med list.                   Brand Name Dispense Instructions for use Diagnosis    acetaminophen 500 MG tablet    TYLENOL    100 tablet    Take 2 tablets (1,000 mg) by mouth every 8 hours    Acute post-operative pain       amylase-lipase-protease 92014-84225 units Cpep per EC capsule    CREON    450 capsule    Take 2-3 with meals / 1-2 with snacks, up to 15 per day.    Necrotizing pancreatitis       BD insulin syringe ultrafine 31G X 5/16\" 0.3 ML   Generic drug:  insulin syringe-needle U-100           blood glucose monitoring test strip    ONETOUCH VERIO IQ    400 each    Use to test blood sugar 4 times daily or as directed.    " Type 2 diabetes mellitus with diabetic polyneuropathy, with long-term current use of insulin (H)       * capecitabine 500 MG tablet CHEMO    XELODA    84 tablet    Take 2 tablets (1,000 mg) by mouth 2 times daily for 21 days Days 1 through 21, then 7 days off. Take within 30 mins after a meal.    Pancreatic adenocarcinoma (H)       * capecitabine 500 MG tablet CHEMO    XELODA    22 tablet    Take 2 tablets (1,000 mg) by mouth 2 times daily for 21 days Days 1 through 21, then 7 days off. Take within 30 mins after a meal.    Pancreatic adenocarcinoma (H)       docusate sodium 100 MG capsule    COLACE    60 capsule    Take 1 capsule (100 mg) by mouth daily    Other constipation       insulin  UNIT/ML injection    HumuLIN N/NovoLIN N    3 vial    Inject 10 units twice daily on infusion days, 5 units twice daily on all other days.    Type 2 diabetes mellitus with diabetic polyneuropathy, with long-term current use of insulin (H)       insulin pen needle 32G X 4 MM    BD DOTTY U/F    100 each    Use 2 daily as directed.    Hyperglycemia, Malignant neoplasm of pancreas, unspecified location of malignancy (H)       insulin syringes (disposable) U-100 0.3 ML Misc     1 each    Inject 10 units twice daily on infusion days, 5 units twice daily all other days    Type 2 diabetes mellitus with diabetic polyneuropathy, with long-term current use of insulin (H)       lidocaine (viscous) 2 % solution    XYLOCAINE    100 mL    Take 15 mLs by mouth every 2 hours as needed for moderate pain swish and spit every 3-8 hours as needed; max 8 doses/24 hour period    Mucositis due to chemotherapy, Pancreatic adenocarcinoma (H)       LORazepam 0.5 MG tablet    ATIVAN    30 tablet    Take 1 tablet (0.5 mg) by mouth every 4 hours as needed (Anxiety, Nausea/Vomiting or Sleep)    Pancreatic adenocarcinoma (H)       magic mouthwash suspension (diphenhydrAMINE, lidocaine, aluminum-magnesium & simethicone) Susp compounding kit     237 mL     Swish and swallow 5-10 mLs in mouth every 6 hours as needed for mouth sores    Mouth sores       nystatin [id number] UNIT/ML suspension    MYCOSTATIN    280 mL    Take 5 mLs (500,000 Units) by mouth 4 times daily Swish and swallow 4 times daily    Mouth sores       ondansetron 8 MG tablet    ZOFRAN    30 tablet    Take 1 tablet (8 mg) by mouth every 8 hours as needed for nausea    Pancreatic adenocarcinoma (H)       ONETOUCH DELICA LANCETS 33G Misc     100 each    4 lancets daily    Type 2 diabetes mellitus with diabetic polyneuropathy, with long-term current use of insulin (H)       oxyCODONE 5 MG/5ML solution    ROXICODONE    473 mL    Take 5-10 mLs (5-10 mg) by mouth every 6 hours as needed for severe pain    Mucositis due to chemotherapy, Pancreatic adenocarcinoma (H)       pantoprazole 40 MG EC tablet    PROTONIX    30 tablet    Take 1 tablet (40 mg) by mouth every morning    Pancreatic adenocarcinoma (H)       polyethylene glycol Packet    MIRALAX/GLYCOLAX    30 packet    Take 17 g by mouth daily    Drug-induced constipation       prochlorperazine 10 MG tablet    COMPAZINE    30 tablet    Take 1 tablet (10 mg) by mouth every 6 hours as needed (Nausea/Vomiting)    Pancreatic adenocarcinoma (H)       * Notice:  This list has 2 medication(s) that are the same as other medications prescribed for you. Read the directions carefully, and ask your doctor or other care provider to review them with you.

## 2018-08-28 NOTE — PROGRESS NOTES
Oncology/Hematology Visit Note  Aug 28, 2018    Reason for Visit: Follow up of resectable pancreatic cancer    History of Present Illness: Staging CT chest/abd/pelvis on 12/9/17 showed several small pulmonary nodules (largest 3mm), unchanged mass in the pancreatic tail, mildly prominent mesenteric nodes thought likely reactive, and small right hepatic lobe hypodensities. Abdominal MRI on 12/10/17 showed hepatic hemangiomas, no evidence of metastatic disease to the liver.   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     Kitty met with Dr Monk and discussed neoadjuvant chemotherapy with FOLFIRINOX, then surgery and then adjuvant chemotherapy. She was seen on 1/15/18 for cycle 1 and then hospitalized for pancreatitis on 1/20/18 where she was managed with IV fluds and pain control. GI took her to the OR to attempt pancreatic duct stent placement but unfortunately it was completely obstructed. They were able to place a pancreatic stent and perform an EUS cyst-gastrostomy with plans to repeat Xray a month following to ensure stent passage. She was d/c 1/27. Cycle 2 was given 2/5/18 (a week delayed due to hospitalization).     She went to ED on 2/11 with worsening abdominal pain and was worried about pancreatitis. W/u with CT and labs including lipase were negative. She had elevated WBC of 33K but had Neulasta on 2/8.     Cycle 3 was given on 2/20 with dose-reduction (10%) of oxaliplatin due to significant cold sensitivity and motor neuropathy. Complicated by bronchitis.    Cycle 4 was given 3/8/18. She went on to have distal pancreatotomy and splenectomy and cholecystectomy on 4/17/18. Final pathology showed complete pathologic response, 26 benign lymph nodes, and necrotizing pancreatitis.     She started adjuvant chemotherapy with Xeloda and gemcitabine 5/30/18. She had baseline CT on 5/22/18 showing no evidence of  "recurrent disease. Xeloda was dose-reduced with cycle 3 due to mucositis. Cycle 3 was also delayed a week. She returns today prior to cycle 4.     Interval History:  Mrs. Bangura returns to clinic today with her  prior to cycle 4 day 1. She is feeling much better compared to our last visit. She had no mouth sores this past cycle with dose-reduction of Xeloda. She had a little soreness with swallowing and scratchy throat the past few days but has been rinsing with salt and soda rinses. She is tired and has mild nausea on day 1 and day 2 but then recovers nicely. She will take a few antiemetics to help her eat. Has alternating diarrhea and constipation but feels she is managing her meds okay. No fevers/chills. Slight cough with scratchy throat. No HFS. She is happy this is her last cycle.     Review of Systems:  Patient denies fevers, chills, night sweats, unexplained weight changes, headaches, dizziness, vision or hearing changes, new lumps or bumps, chest pain, shortness of breath, cough, abdominal pain, nausea, vomiting, changes to bowel or bladder, swelling of extremities, bleeding issues, or rash.    Current Outpatient Prescriptions   Medication Sig Dispense Refill     acetaminophen (TYLENOL) 500 MG tablet Take 2 tablets (1,000 mg) by mouth every 8 hours (Patient not taking: Reported on 7/23/2018) 100 tablet 1     amylase-lipase-protease (CREON) 55262-85491 UNITS CPEP per EC capsule Take 2-3 with meals / 1-2 with snacks, up to 15 per day. 450 capsule 6     BD INSULIN SYRINGE ULTRAFINE 31G X 5/16\" 0.3 ML        blood glucose monitoring (ONETOUCH VERIO IQ) test strip Use to test blood sugar 4 times daily or as directed. 400 each 3     capecitabine (XELODA) 500 MG tablet CHEMO Take 2 tablets (1,000 mg) by mouth 2 times daily for 21 days Days 1 through 21, then 7 days off. Take within 30 mins after a meal. 84 tablet 0     docusate sodium (COLACE) 100 MG capsule Take 1 capsule (100 mg) by mouth daily (Patient " not taking: Reported on 8/6/2018) 60 capsule 1     insulin NPH (HUMULIN N/NOVOLIN N) 100 UNIT/ML injection Inject 10 units twice daily on infusion days, 5 units twice daily on all other days. 3 vial 3     insulin pen needle (BD DOTTY U/F) 32G X 4 MM Use 2 daily as directed. 100 each 11     insulin syringes, disposable, U-100 0.3 ML MISC Inject 10 units twice daily on infusion days, 5 units twice daily all other days 1 each 3     lidocaine, viscous, (XYLOCAINE) 2 % solution Take 15 mLs by mouth every 2 hours as needed for moderate pain swish and spit every 3-8 hours as needed; max 8 doses/24 hour period (Patient not taking: Reported on 8/6/2018) 100 mL 1     LORazepam (ATIVAN) 0.5 MG tablet Take 1 tablet (0.5 mg) by mouth every 4 hours as needed (Anxiety, Nausea/Vomiting or Sleep) 30 tablet 2     magic mouthwash (FIRST-MOUTHWASH BLM) suspension Swish and swallow 5-10 mLs in mouth every 6 hours as needed for mouth sores (Patient not taking: Reported on 8/6/2018) 237 mL 1     nystatin (MYCOSTATIN) 834472 UNIT/ML suspension Take 5 mLs (500,000 Units) by mouth 4 times daily Swish and swallow 4 times daily (Patient not taking: Reported on 7/30/2018) 280 mL 1     ondansetron (ZOFRAN) 8 MG tablet Take 1 tablet (8 mg) by mouth every 8 hours as needed for nausea 30 tablet 0     ONETOUCH DELICA LANCETS 33G MISC 4 lancets daily 100 each 3     oxyCODONE (ROXICODONE) 5 MG/5ML solution Take 5-10 mLs (5-10 mg) by mouth every 6 hours as needed for severe pain (Patient not taking: Reported on 8/6/2018) 473 mL 0     pantoprazole (PROTONIX) 40 MG EC tablet Take 1 tablet (40 mg) by mouth every morning 90 tablet 1     polyethylene glycol (MIRALAX/GLYCOLAX) Packet Take 17 g by mouth daily 30 packet 0     prochlorperazine (COMPAZINE) 10 MG tablet Take 1 tablet (10 mg) by mouth every 6 hours as needed (Nausea/Vomiting) 30 tablet 2       Physical Examination:  BP 94/55 (BP Location: Right arm, Patient Position: Sitting, Cuff Size: Adult  "Regular)  Pulse 69  Temp 97.8  F (36.6  C) (Oral)  Resp 16  Ht 1.651 m (5' 5\")  Wt 67.9 kg (149 lb 11.2 oz)  SpO2 98%  BMI 24.91 kg/m2  Wt Readings from Last 10 Encounters:   08/13/18 68 kg (149 lb 14.4 oz)   08/06/18 67.9 kg (149 lb 9.6 oz)   07/30/18 67.8 kg (149 lb 8 oz)   07/23/18 68.8 kg (151 lb 11.2 oz)   07/16/18 68.3 kg (150 lb 9.6 oz)   07/09/18 71.2 kg (157 lb)   07/09/18 71.5 kg (157 lb 11.2 oz)   07/02/18 68.9 kg (151 lb 12.8 oz)   06/26/18 69.4 kg (153 lb 1.6 oz)   06/12/18 67.1 kg (147 lb 14.4 oz)     Constitutional: Well-appearing female in no acute distress.  Eyes: EOMI, PERRL. No scleral icterus.  ENT: Oral mucosa is moist without thrush. Some mucositis changes sides of buccal mucosa   Lymphatic: Neck is supple without cervical or supraclavicular lymphadenopathy.   Cardiovascular: Regular rate and rhythm. No murmurs, gallops, or rubs. No peripheral edema.  Respiratory: Clear to auscultation bilaterally. No wheezes or crackles.   Gastrointestinal: Bowel sounds present. Abdomen soft, nontender, nondistended. No palpable hepatosplenomegaly or masses.   Neurologic: Cranial nerves II through XII are grossly intact.  Skin: No rashes, petechiae, or bruising noted on exposed skin.    Laboratory Data:   8/28/2018 08:26   Sodium 140   Potassium 4.1   Chloride 108   Carbon Dioxide 27   Urea Nitrogen 15   Creatinine 0.62   GFR Estimate >90   GFR Estimate If Black >90   Calcium 8.6   Anion Gap 5   Albumin 3.2 (L)   Protein Total 7.0   Bilirubin Total 0.6   Alkaline Phosphatase 105   ALT 24   AST 24   Glucose 142 (H)   WBC 8.6   Hemoglobin 11.1 (L)   Hematocrit 34.0 (L)   Platelet Count 632 (H)   RBC Count 3.53 (L)   MCV 96   MCH 31.4   MCHC 32.6   RDW 22.5 (H)   Diff Method Automated Method   % Neutrophils 48.3   % Lymphocytes 26.0   % Monocytes 20.9   % Eosinophils 3.9   % Basophils 0.7   % Immature Granulocytes 0.2   Nucleated RBCs 0   Absolute Neutrophil 4.1   Absolute Lymphocytes 2.2   Absolute " Monocytes 1.8 (H)   Absolute Eosinophils 0.3   Absolute Basophils 0.1   Abs Immature Granulocytes 0.0   Absolute Nucleated RBC 0.0   Platelet Estimate Confirming automa...     Assessment and Plan:  1. Pancreatic cancer  S/p 4 cycles of neoadjuvant  FOLFIRINOX with good response so she was able to undergo distal pancreatectomy and splenectomy. Her pathology showed complete pathologic response. She started adjuvant gem/xeloda 3 months ago. Cycle 2 complicated by severe mucositis related to Xeloda. Cycle 3 was given with dose-reduced Xeloda. She tolerated chemo well with dose-reduction. Will proceed with cycle 4 with the same dosing. Following this cycle she will have post-therapy restaging and start active surveillance. She has follow-up with Dr. Monk at that time. She will call with any interval concerns.     2. Mucositis: Mild now. Continue salt and soda rinses BID and increase as needed.     3. DM: Secondary to prediabetes, family history, and now distal pancreatectomy. She saw endocrinology and was started on NPH with dosing for steroids. Dex dose is 8 mg with infusions.     4. Nausea: Antiemetics prn.     Kellie Nobles PA-C    [facility]  [address] [place] [address]  [phone]

## 2018-08-28 NOTE — PATIENT INSTRUCTIONS
Contact Numbers  [facility]: [phone]    After Hours:  [phone]  Triage: [phone]    Please call the [facility] Triage line if you experience a temperature greater than or equal to 100.5, shaking chills, have uncontrolled nausea, vomiting and/or diarrhea, dizziness, shortness of breath, chest pain, bleeding, unexplained bruising, or if you have any other new/concerning symptoms, questions or concerns.     If it is after hours, weekends, or holidays, please call the main hospital  at  [phone] and ask to speak to the Oncology doctor on call.     If you are having any concerning symptoms or wish to speak to a provider before your next infusion visit, please call your care coordinator or triage to notify them so we can adequately serve you.     If you need a refill on a narcotic prescription or other medication, please call triage before your infusion appointment.           August 2018 Sunday Monday Tuesday Wednesday Thursday Friday Saturday                  1     2     3     4       5     6     [facility] DRAW    8:30 AM   (15 min.)    [facility] DRAW   [facility] Lab Draw     P ONC INFUSION 60    9:00 AM   (60 min.)    ONCOLOGY INFUSION   [facility] 7     8     9     10     11       12     13     P [facility] DRAW    1:00 PM   (15 min.)    [facility] DRAW   [facility] Draw     P ONC INFUSION 60    1:30 PM   (60 min.)    ONCOLOGY INFUSION   [facility] 14     15     16     17     18       19     20     21     22     23     24     25       26     27     28     [facility] DRAW    8:00 AM   (15 min.)    [facility] DRAW   [facility] Draw     [facility] RETURN    8:25 AM   (50 min.)   Kellie Nobles PA-C   [facility]     [facility] ONC INFUSION 60    9:30 AM   (60 min.)    ONCOLOGY INFUSION   [facility] 29 30 31 September 2018    Sunday Monday Tuesday Wednesday Thursday Friday Saturday                                 1       2     3     4     [facility]    9:00 AM   (15 min.)    [facility] DRAW   [facility] Draw     [facility] ONC INFUSION 60    9:30 AM   (60 min.)    ONCOLOGY INFUSION   [facility] 5     6     7     8       9     10     11     [facility] DRAW    9:00 AM   (15 min.)    [facility] DRAW   [facility] Draw     [facility] ONC INFUSION 60    9:30 AM   (60 min.)    ONCOLOGY INFUSION   [facility] 12     13     14     15       16     17     CT CHEST/ABDOMEN/PELVIS W    9:15 AM   (30 min.)   [id number]   [facility] CT 18     19     20     21     22       23     24     [facility]    7:30 AM   (15 min.)    [facility]     [facility] RETURN    8:00 AM   (30 min.)   Jovany Monk MD   [facility] 25     26     27     28     29       30                                                Lab Results:  Recent Results (from the past 12 hour(s))   CBC with platelets differential    Collection Time: 08/28/18  8:26 AM   Result Value Ref Range    WBC 8.6 4.0 - 11.0 10e9/L    RBC Count 3.53 (L) 3.8 - 5.2 10e12/L    Hemoglobin 11.1 (L) 11.7 - 15.7 g/dL    Hematocrit 34.0 (L) 35.0 - 47.0 %    MCV 96 78 - 100 fl    MCH 31.4 26.5 - 33.0 pg    MCHC 32.6 31.5 - 36.5 g/dL    RDW 22.5 (H) 10.0 - 15.0 %    Platelet Count 632 (H) 150 - 450 10e9/L    Diff Method Automated Method     % Neutrophils 48.3 %    % Lymphocytes 26.0 %    % Monocytes 20.9 %    % Eosinophils 3.9 %    % Basophils 0.7 %    % Immature Granulocytes 0.2 %    Nucleated RBCs 0 0 /100    Absolute Neutrophil 4.1 1.6 - 8.3 10e9/L    Absolute Lymphocytes 2.2 0.8 - 5.3 10e9/L    Absolute Monocytes 1.8 (H) 0.0 - 1.3 10e9/L    Absolute Eosinophils 0.3 0.0 - 0.7 10e9/L    Absolute Basophils 0.1 0.0 - 0.2 10e9/L    Abs Immature Granulocytes 0.0 0 - 0.4 10e9/L    Absolute  Nucleated RBC 0.0     Platelet Estimate Confirming automated cell count    Comprehensive metabolic panel    Collection Time: 08/28/18  8:26 AM   Result Value Ref Range    Sodium 140 133 - 144 mmol/L    Potassium 4.1 3.4 - 5.3 mmol/L    Chloride 108 94 - 109 mmol/L    Carbon Dioxide 27 20 - 32 mmol/L    Anion Gap 5 3 - 14 mmol/L    Glucose 142 (H) 70 - 99 mg/dL    Urea Nitrogen 15 7 - 30 mg/dL    Creatinine 0.62 0.52 - 1.04 mg/dL    GFR Estimate >90 >60 mL/min/1.7m2    GFR Estimate If Black >90 >60 mL/min/1.7m2    Calcium 8.6 8.5 - 10.1 mg/dL    Bilirubin Total 0.6 0.2 - 1.3 mg/dL    Albumin 3.2 (L) 3.4 - 5.0 g/dL    Protein Total 7.0 6.8 - 8.8 g/dL    Alkaline Phosphatase 105 40 - 150 U/L    ALT 24 0 - 50 U/L    AST 24 0 - 45 U/L

## 2018-09-04 ENCOUNTER — APPOINTMENT (OUTPATIENT)
Dept: LAB | Facility: CLINIC | Age: 58
End: 2018-09-04
Attending: INTERNAL MEDICINE
Payer: COMMERCIAL

## 2018-09-04 ENCOUNTER — MYC REFILL (OUTPATIENT)
Dept: GASTROENTEROLOGY | Facility: CLINIC | Age: 58
End: 2018-09-04

## 2018-09-04 ENCOUNTER — INFUSION THERAPY VISIT (OUTPATIENT)
Dept: ONCOLOGY | Facility: CLINIC | Age: 58
End: 2018-09-04
Attending: INTERNAL MEDICINE
Payer: COMMERCIAL

## 2018-09-04 VITALS
BODY MASS INDEX: 24.91 KG/M2 | TEMPERATURE: 97.3 F | WEIGHT: 149.7 LBS | HEART RATE: 67 BPM | SYSTOLIC BLOOD PRESSURE: 94 MMHG | RESPIRATION RATE: 16 BRPM | DIASTOLIC BLOOD PRESSURE: 59 MMHG | OXYGEN SATURATION: 98 %

## 2018-09-04 DIAGNOSIS — K85.91 NECROTIZING PANCREATITIS: ICD-10-CM

## 2018-09-04 DIAGNOSIS — Z79.899 ENCOUNTER FOR LONG-TERM (CURRENT) USE OF MEDICATIONS: ICD-10-CM

## 2018-09-04 DIAGNOSIS — E11.42 TYPE 2 DIABETES MELLITUS WITH DIABETIC POLYNEUROPATHY, WITH LONG-TERM CURRENT USE OF INSULIN (H): ICD-10-CM

## 2018-09-04 DIAGNOSIS — C25.9 PANCREATIC ADENOCARCINOMA (H): Primary | ICD-10-CM

## 2018-09-04 DIAGNOSIS — Z79.4 TYPE 2 DIABETES MELLITUS WITH DIABETIC POLYNEUROPATHY, WITH LONG-TERM CURRENT USE OF INSULIN (H): ICD-10-CM

## 2018-09-04 LAB
ALBUMIN SERPL-MCNC: 3.4 G/DL (ref 3.4–5)
ALP SERPL-CCNC: 117 U/L (ref 40–150)
ALT SERPL W P-5'-P-CCNC: 28 U/L (ref 0–50)
ANION GAP SERPL CALCULATED.3IONS-SCNC: 9 MMOL/L (ref 3–14)
AST SERPL W P-5'-P-CCNC: 26 U/L (ref 0–45)
BASOPHILS # BLD AUTO: 0.1 10E9/L (ref 0–0.2)
BASOPHILS NFR BLD AUTO: 1.6 %
BILIRUB SERPL-MCNC: 0.4 MG/DL (ref 0.2–1.3)
BUN SERPL-MCNC: 19 MG/DL (ref 7–30)
CALCIUM SERPL-MCNC: 8.6 MG/DL (ref 8.5–10.1)
CHLORIDE SERPL-SCNC: 105 MMOL/L (ref 94–109)
CO2 SERPL-SCNC: 26 MMOL/L (ref 20–32)
CREAT SERPL-MCNC: 0.61 MG/DL (ref 0.52–1.04)
DIFFERENTIAL METHOD BLD: ABNORMAL
EOSINOPHIL # BLD AUTO: 0.1 10E9/L (ref 0–0.7)
EOSINOPHIL NFR BLD AUTO: 1.4 %
ERYTHROCYTE [DISTWIDTH] IN BLOOD BY AUTOMATED COUNT: 22.3 % (ref 10–15)
GFR SERPL CREATININE-BSD FRML MDRD: >90 ML/MIN/1.7M2
GLUCOSE SERPL-MCNC: 174 MG/DL (ref 70–99)
HCT VFR BLD AUTO: 33.7 % (ref 35–47)
HGB BLD-MCNC: 11 G/DL (ref 11.7–15.7)
IMM GRANULOCYTES # BLD: 0.1 10E9/L (ref 0–0.4)
IMM GRANULOCYTES NFR BLD: 1.3 %
LYMPHOCYTES # BLD AUTO: 3.1 10E9/L (ref 0.8–5.3)
LYMPHOCYTES NFR BLD AUTO: 54.8 %
MCH RBC QN AUTO: 31.9 PG (ref 26.5–33)
MCHC RBC AUTO-ENTMCNC: 32.6 G/DL (ref 31.5–36.5)
MCV RBC AUTO: 98 FL (ref 78–100)
MONOCYTES # BLD AUTO: 1 10E9/L (ref 0–1.3)
MONOCYTES NFR BLD AUTO: 17.7 %
NEUTROPHILS # BLD AUTO: 1.3 10E9/L (ref 1.6–8.3)
NEUTROPHILS NFR BLD AUTO: 23.2 %
NRBC # BLD AUTO: 0.2 10*3/UL
NRBC BLD AUTO-RTO: 3 /100
PLATELET # BLD AUTO: 529 10E9/L (ref 150–450)
POTASSIUM SERPL-SCNC: 4.3 MMOL/L (ref 3.4–5.3)
PROT SERPL-MCNC: 7.3 G/DL (ref 6.8–8.8)
RBC # BLD AUTO: 3.45 10E12/L (ref 3.8–5.2)
SODIUM SERPL-SCNC: 139 MMOL/L (ref 133–144)
WBC # BLD AUTO: 5.6 10E9/L (ref 4–11)

## 2018-09-04 PROCEDURE — 25000128 H RX IP 250 OP 636: Mod: ZF | Performed by: PHYSICIAN ASSISTANT

## 2018-09-04 PROCEDURE — 85025 COMPLETE CBC W/AUTO DIFF WBC: CPT | Performed by: PHYSICIAN ASSISTANT

## 2018-09-04 PROCEDURE — 86301 IMMUNOASSAY TUMOR CA 19-9: CPT | Performed by: PHYSICIAN ASSISTANT

## 2018-09-04 PROCEDURE — 96413 CHEMO IV INFUSION 1 HR: CPT

## 2018-09-04 PROCEDURE — 96375 TX/PRO/DX INJ NEW DRUG ADDON: CPT

## 2018-09-04 PROCEDURE — 80053 COMPREHEN METABOLIC PANEL: CPT | Performed by: PHYSICIAN ASSISTANT

## 2018-09-04 PROCEDURE — 25000128 H RX IP 250 OP 636: Mod: ZF | Performed by: INTERNAL MEDICINE

## 2018-09-04 RX ORDER — HEPARIN SODIUM (PORCINE) LOCK FLUSH IV SOLN 100 UNIT/ML 100 UNIT/ML
5 SOLUTION INTRAVENOUS ONCE
Status: COMPLETED | OUTPATIENT
Start: 2018-09-04 | End: 2018-09-04

## 2018-09-04 RX ADMIN — GEMCITABINE 1800 MG: 38 INJECTION INTRAVENOUS at 10:31

## 2018-09-04 RX ADMIN — SODIUM CHLORIDE 250 ML: 9 INJECTION, SOLUTION INTRAVENOUS at 09:41

## 2018-09-04 RX ADMIN — SODIUM CHLORIDE, PRESERVATIVE FREE 5 ML: 5 INJECTION INTRAVENOUS at 11:02

## 2018-09-04 RX ADMIN — DEXAMETHASONE SODIUM PHOSPHATE 8 MG: 10 INJECTION, SOLUTION INTRAMUSCULAR; INTRAVENOUS at 10:16

## 2018-09-04 RX ADMIN — Medication 5 ML: at 09:00

## 2018-09-04 ASSESSMENT — PAIN SCALES - GENERAL: PAINLEVEL: NO PAIN (0)

## 2018-09-04 NOTE — PATIENT INSTRUCTIONS
Contact Numbers  [facility]: [phone]    After Hours:  [phone]  Triage: [phone]    Please call the [facility] Triage line if you experience a temperature greater than or equal to 100.5, shaking chills, have uncontrolled nausea, vomiting and/or diarrhea, dizziness, shortness of breath, chest pain, bleeding, unexplained bruising, or if you have any other new/concerning symptoms, questions or concerns.     If it is after hours, weekends, or holidays, please call the main hospital  at  [phone] and ask to speak to the Oncology doctor on call.     If you are having any concerning symptoms or wish to speak to a provider before your next infusion visit, please call your care coordinator or triage to notify them so we can adequately serve you.     If you need a refill on a narcotic prescription or other medication, please call triage before your infusion appointment.           September 2018 Sunday Monday Tuesday Wednesday Thursday Friday Saturday                                 1       2     3     4     [facility]    9:00 AM   (15 min.)   [facility]   [facility] Lab Draw     [facility] ONC INFUSION 60    9:30 AM   (60 min.)    ONCOLOGY INFUSION   [facility] 5     6     7     8       9     10     11     [facility]    9:00 AM   (15 min.)   [facility]   [facility] Lab Draw     [facility] ONC INFUSION 60    9:30 AM   (60 min.)    ONCOLOGY INFUSION   [facility] 12     13     14     15       16     17     CT CHEST/ABDOMEN/PELVIS W    9:15 AM   (30 min.)   [facility]   [facility] CT 18     19     20     21     22       23     24     [facility]    7:30 AM   (15 min.)    [facility] DRAW   [facility] Lab Draw     [facility] RETURN    8:00 AM   (30 min.)   Jovany Monk MD   [facility] 25     26     27     28     29       30                                              October 2018 Sunday  Monday Tuesday Wednesday Thursday Friday Saturday        1     2     3     4     5     6       7     8     9     [facility] RETURN DIABETES    9:15 AM   (30 min.)   Latricia Bustillos PA-C [facility] 10     11     12     13       14     15     16     17     18     19     20       21     22     23     24     25     26     27       28     29     30     31                                 Lab Results:  Recent Results (from the past 12 hour(s))   CBC with platelets differential    Collection Time: 09/04/18  9:08 AM   Result Value Ref Range    WBC 5.6 4.0 - 11.0 10e9/L    RBC Count 3.45 (L) 3.8 - 5.2 10e12/L    Hemoglobin 11.0 (L) 11.7 - 15.7 g/dL    Hematocrit 33.7 (L) 35.0 - 47.0 %    MCV 98 78 - 100 fl    MCH 31.9 26.5 - 33.0 pg    MCHC 32.6 31.5 - 36.5 g/dL    RDW 22.3 (H) 10.0 - 15.0 %    Platelet Count 529 (H) 150 - 450 10e9/L    Diff Method Automated Method     % Neutrophils 23.2 %    % Lymphocytes 54.8 %    % Monocytes 17.7 %    % Eosinophils 1.4 %    % Basophils 1.6 %    % Immature Granulocytes 1.3 %    Nucleated RBCs 3 (H) 0 /100    Absolute Neutrophil 1.3 (L) 1.6 - 8.3 10e9/L    Absolute Lymphocytes 3.1 0.8 - 5.3 10e9/L    Absolute Monocytes 1.0 0.0 - 1.3 10e9/L    Absolute Eosinophils 0.1 0.0 - 0.7 10e9/L    Absolute Basophils 0.1 0.0 - 0.2 10e9/L    Abs Immature Granulocytes 0.1 0 - 0.4 10e9/L    Absolute Nucleated RBC 0.2    Comprehensive metabolic panel    Collection Time: 09/04/18  9:08 AM   Result Value Ref Range    Sodium 139 133 - 144 mmol/L    Potassium 4.3 3.4 - 5.3 mmol/L    Chloride 105 94 - 109 mmol/L    Carbon Dioxide 26 20 - 32 mmol/L    Anion Gap 9 3 - 14 mmol/L    Glucose 174 (H) 70 - 99 mg/dL    Urea Nitrogen 19 7 - 30 mg/dL    Creatinine 0.61 0.52 - 1.04 mg/dL    GFR Estimate >90 >60 mL/min/1.7m2    GFR Estimate If Black >90 >60 mL/min/1.7m2    Calcium 8.6 8.5 - 10.1 mg/dL    Bilirubin Total 0.4 0.2 - 1.3 mg/dL    Albumin 3.4 3.4 - 5.0 g/dL    Protein Total 7.3 6.8 - 8.8 g/dL     Alkaline Phosphatase 117 40 - 150 U/L    ALT 28 0 - 50 U/L    AST 26 0 - 45 U/L

## 2018-09-04 NOTE — MR AVS SNAPSHOT
After Visit Summary   9/4/2018    Kitty Bangura    MRN: [id number]           Patient Information     Date Of Birth          1960        Visit Information        Provider Department      9/4/2018 9:30 AM  22 ATC;  ONCOLOGY INFUSION [facility]        Today's Diagnoses     Pancreatic adenocarcinoma (H)    -  1    Encounter for long-term (current) use of medications          Care Instructions    Contact Numbers  [facility]: [phone]    After Hours:  [phone]  Triage: [phone]    Please call the [facility] Triage line if you experience a temperature greater than or equal to 100.5, shaking chills, have uncontrolled nausea, vomiting and/or diarrhea, dizziness, shortness of breath, chest pain, bleeding, unexplained bruising, or if you have any other new/concerning symptoms, questions or concerns.     If it is after hours, weekends, or holidays, please call the main hospital  at  [phone] and ask to speak to the Oncology doctor on call.     If you are having any concerning symptoms or wish to speak to a provider before your next infusion visit, please call your care coordinator or triage to notify them so we can adequately serve you.     If you need a refill on a narcotic prescription or other medication, please call triage before your infusion appointment.           September 2018 Sunday Monday Tuesday Wednesday Thursday Friday Saturday                                 1       2     3     4     [facility]    9:00 AM   (15 min.)   [facility]   [facility] Lab Draw     [facility] ONC INFUSION 60    9:30 AM   (60 min.)    ONCOLOGY INFUSION   [facility] 5     6     7     8       9     10     11     [facility]    9:00 AM   (15 min.)   [facility]   [facility] Lab Draw     [facility] ONC INFUSION 60    9:30 AM   (60 min.)    ONCOLOGY INFUSION   [facility] 12     13     14      15       16     17     CT CHEST/ABDOMEN/PELVIS W    9:15 AM   (30 min.)   [facility]CT1   [facility] CT 18     19     20     21     22       23     24     [facility]    7:30 AM   (15 min.)    [facility]     [facility] RETURN    8:00 AM   (30 min.)   Jovany Monk MD   [facility] 25     26     27     28     29       30 October 2018 Sunday Monday Tuesday Wednesday Thursday Friday Saturday        1     2     3     4     5     6       7     8     9     [facility] RETURN DIABETES    9:15 AM   (30 min.)   Latricia Bustillos PA-C   [facility] Endocrinology 10     11     12     13       14     15     16     17     18     19     20       21     22     23     24     25     26     27       28     29     30     31                                 Lab Results:  Recent Results (from the past 12 hour(s))   CBC with platelets differential    Collection Time: 09/04/18  9:08 AM   Result Value Ref Range    WBC 5.6 4.0 - 11.0 10e9/L    RBC Count 3.45 (L) 3.8 - 5.2 10e12/L    Hemoglobin 11.0 (L) 11.7 - 15.7 g/dL    Hematocrit 33.7 (L) 35.0 - 47.0 %    MCV 98 78 - 100 fl    MCH 31.9 26.5 - 33.0 pg    MCHC 32.6 31.5 - 36.5 g/dL    RDW 22.3 (H) 10.0 - 15.0 %    Platelet Count 529 (H) 150 - 450 10e9/L    Diff Method Automated Method     % Neutrophils 23.2 %    % Lymphocytes 54.8 %    % Monocytes 17.7 %    % Eosinophils 1.4 %    % Basophils 1.6 %    % Immature Granulocytes 1.3 %    Nucleated RBCs 3 (H) 0 /100    Absolute Neutrophil 1.3 (L) 1.6 - 8.3 10e9/L    Absolute Lymphocytes 3.1 0.8 - 5.3 10e9/L    Absolute Monocytes 1.0 0.0 - 1.3 10e9/L    Absolute Eosinophils 0.1 0.0 - 0.7 10e9/L    Absolute Basophils 0.1 0.0 - 0.2 10e9/L    Abs Immature Granulocytes 0.1 0 - 0.4 10e9/L    Absolute Nucleated RBC 0.2    Comprehensive metabolic panel    Collection Time: 09/04/18  9:08 AM   Result Value Ref Range    Sodium 139 133 - 144 mmol/L    Potassium 4.3  3.4 - 5.3 mmol/L    Chloride 105 94 - 109 mmol/L    Carbon Dioxide 26 20 - 32 mmol/L    Anion Gap 9 3 - 14 mmol/L    Glucose 174 (H) 70 - 99 mg/dL    Urea Nitrogen 19 7 - 30 mg/dL    Creatinine 0.61 0.52 - 1.04 mg/dL    GFR Estimate >90 >60 mL/min/1.7m2    GFR Estimate If Black >90 >60 mL/min/1.7m2    Calcium 8.6 8.5 - 10.1 mg/dL    Bilirubin Total 0.4 0.2 - 1.3 mg/dL    Albumin 3.4 3.4 - 5.0 g/dL    Protein Total 7.3 6.8 - 8.8 g/dL    Alkaline Phosphatase 117 40 - 150 U/L    ALT 28 0 - 50 U/L    AST 26 0 - 45 U/L               Follow-ups after your visit        Your next 10 appointments already scheduled     Sep 11, 2018  9:00 AM CDT   [facility] Draw with  [facility] DRAW   [facility] Lab Draw ([facility])    [address] [place] [address]  [address]  [facility] [id number] [phone]            Sep 11, 2018  9:30 AM CDT   Infusion 60 with UC ONCOLOGY INFUSION, UC 21 ATC   [facility] ([facility])    [address] [place] [address]  [address]  [facility] [id number] [phone]            Sep 17, 2018  9:15 AM CDT   CT CHEST/ABDOMEN/PELVIS W CONTRAST with WYCT1   [facility] CT ([facility])    [address] [facility] [address] [phone]           Please bring any scans or X-rays taken at other hospitals, if similar tests were done. Also bring a list of your medicines, including vitamins, minerals and over-the-counter drugs. It is safest to leave personal items at home.  Be sure to tell your doctor:   If you have any allergies.   If there s any chance you are pregnant.   If you are breastfeeding.  How to prepare:   Do not eat or drink for 2 hours before your exam. If you need to take medicine, you may take it with small sips of water. (We may ask you to take liquid medicine as well.)   Please wear loose clothing, such as a sweat suit or jogging clothes. Avoid snaps, zippers and other metal. We may ask you to  undress and put on a hospital gown.  Please arrive 30 minutes early for your CT. Once in the department you might be asked to drink water 15-20 minutes prior to your exam.  If indicated you may be asked to drink an oral contrast in advance of your CT.  If this is the case, the imaging team will let you know or be in contact with you prior to your appointment  Patients over 70 or patients with diabetes or kidney problems:   If you haven t had a blood test (creatinine test) within the last 30 days, the Cardiologist/Radiologist may require you to get this test prior to your exam.  If you have diabetes:   Continue to take your metformin medication on the day of your exam  If you have any questions, please call the Imaging Department where you will have your exam.            Sep 24, 2018  7:30 AM CDT   [facility] Lab Draw with  [facility] LAB DRAW   [facility])    [address] [place] [address]  [address]  [facility] [address]   [phone]            Sep 24, 2018  8:15 AM CDT   (Arrive by 8:00 AM)   Return Visit with Jovany Monk MD   [facility] ([facility])    [address] [place] [address]  [address]  [facility] [address] [phone]            Oct 09, 2018  9:30 AM CDT   (Arrive by 9:15 AM)   RETURN DIABETES with Latricia Bustillos PA-C   [facility] Endocrinology ([facility])    [address] [place] [address]  3rd [facility] [address] [phone]            Jan 14, 2019  2:00 PM CST   (Arrive by 1:45 PM)   RETURN DIABETES with Es Gallo MD   [facility] ([facility])    [address] [place] [address]  3rd Floor  [facility] [address] [phone]              Who to contact     If you have questions or need follow up information about today's clinic visit or your schedule please contact [facility] directly at [phone].  Normal or  non-critical lab and imaging results will be communicated to you by MyChart, letter or phone within 4 business days after the clinic has received the results. If you do not hear from us within 7 days, please contact the clinic through Beept or phone. If you have a critical or abnormal lab result, we will notify you by phone as soon as possible.  Submit refill requests through [employer] L[employer] or call your pharmacy and they will forward the refill request to us. Please allow 3 business days for your refill to be completed.          Additional Information About Your Visit        [employer] L[employer] Information     [employer] gives you secure access to your electronic health record. If you see a primary care provider, you can also send messages to your care team and make appointments. If you have questions, please call your primary care clinic.  If you do not have a primary care provider, please call [phone] and they will assist you.        Care EveryWhere ID     This is your Care EveryWhere ID. This could be used by other organizations to access your [facility] medical records  [id number]        Your Vitals Were     Pulse Temperature Respirations Pulse Oximetry BMI (Body Mass Index)       67 97.3  F (36.3  C) (Oral) 16 98% 24.91 kg/m2        Blood Pressure from Last 3 Encounters:   09/04/18 94/59   08/28/18 94/55   08/13/18 97/62    Weight from Last 3 Encounters:   09/04/18 67.9 kg (149 lb 11.2 oz)   08/28/18 67.9 kg (149 lb 11.2 oz)   08/13/18 68 kg (149 lb 14.4 oz)              We Performed the Following     Cancer antigen 19-9     CBC with platelets differential     Comprehensive metabolic panel        Primary Care Provider Office Phone # Fax #    Solange Mcgill [phone] [address] [facility] [address]        Equal Access to Services     CHASE DE GUZMAN : Tre Ruiz, solange rasmussen, re hernandez. So [facility]  "[phone].    ATENCIÓN: Si odessa vasquez, tiene a quiros disposición servicios gratuitos de asistencia lingüística. Macy navarrete [phone].    We comply with applicable federal civil rights laws and [place] laws. We do not discriminate on the basis of race, color, national origin, age, disability, sex, sexual orientation, or gender identity.            Thank you!     Thank you for choosing [facility]  for your care. Our goal is always to provide you with excellent care. Hearing back from our patients is one way we can continue to improve our services. Please take a few minutes to complete the written survey that you may receive in the mail after your visit with us. Thank you!             Your Updated Medication List - Protect others around you: Learn how to safely use, store and throw away your medicines at www.disposemymeds.org.          This list is accurate as of 9/4/18 11:05 AM.  Always use your most recent med list.                   Brand Name Dispense Instructions for use Diagnosis    acetaminophen 500 MG tablet    TYLENOL    100 tablet    Take 2 tablets (1,000 mg) by mouth every 8 hours    Acute post-operative pain       amylase-lipase-protease [address] units Cpep per EC capsule    CREON    450 capsule    Take 2-3 with meals / 1-2 with snacks, up to 15 per day.    Necrotizing pancreatitis       BD insulin syringe ultrafine 31G X 5/16\" 0.3 ML   Generic drug:  insulin syringe-needle U-100           blood glucose monitoring test strip    ONETOUCH VERIO IQ    400 each    Use to test blood sugar 4 times daily or as directed.    Type 2 diabetes mellitus with diabetic polyneuropathy, with long-term current use of insulin (H)       capecitabine 500 MG tablet CHEMO    XELODA    22 tablet    Take 2 tablets (1,000 mg) by mouth 2 times daily for 21 days Days 1 through 21, then 7 days off. Take within 30 mins after a meal.    Pancreatic adenocarcinoma (H)       docusate sodium 100 MG capsule    " COLACE    60 capsule    Take 1 capsule (100 mg) by mouth daily    Other constipation       insulin  UNIT/ML injection    HumuLIN N/NovoLIN N    3 vial    Inject 10 units twice daily on infusion days, 5 units twice daily on all other days.    Type 2 diabetes mellitus with diabetic polyneuropathy, with long-term current use of insulin (H)       insulin pen needle 32G X 4 MM    BD DOTTY U/F    100 each    Use 2 daily as directed.    Hyperglycemia, Malignant neoplasm of pancreas, unspecified location of malignancy (H)       insulin syringes (disposable) U-100 0.3 ML Misc     1 each    Inject 10 units twice daily on infusion days, 5 units twice daily all other days    Type 2 diabetes mellitus with diabetic polyneuropathy, with long-term current use of insulin (H)       lidocaine (viscous) 2 % solution    XYLOCAINE    100 mL    Take 15 mLs by mouth every 2 hours as needed for moderate pain swish and spit every 3-8 hours as needed; max 8 doses/24 hour period    Mucositis due to chemotherapy, Pancreatic adenocarcinoma (H)       LORazepam 0.5 MG tablet    ATIVAN    30 tablet    Take 1 tablet (0.5 mg) by mouth every 4 hours as needed (Anxiety, Nausea/Vomiting or Sleep)    Pancreatic adenocarcinoma (H)       magic mouthwash suspension (diphenhydrAMINE, lidocaine, aluminum-magnesium & simethicone) Susp compounding kit     237 mL    Swish and swallow 5-10 mLs in mouth every 6 hours as needed for mouth sores    Mouth sores       nystatin 547451 UNIT/ML suspension    MYCOSTATIN    280 mL    Take 5 mLs (500,000 Units) by mouth 4 times daily Swish and swallow 4 times daily    Mouth sores       ondansetron 8 MG tablet    ZOFRAN    30 tablet    Take 1 tablet (8 mg) by mouth every 8 hours as needed for nausea    Pancreatic adenocarcinoma (H)       ONETOUCH DELICA LANCETS 33G Misc     100 each    4 lancets daily    Type 2 diabetes mellitus with diabetic polyneuropathy, with long-term current use of insulin (H)       oxyCODONE 5  MG/5ML solution    ROXICODONE    473 mL    Take 5-10 mLs (5-10 mg) by mouth every 6 hours as needed for severe pain    Mucositis due to chemotherapy, Pancreatic adenocarcinoma (H)       pantoprazole 40 MG EC tablet    PROTONIX    30 tablet    Take 1 tablet (40 mg) by mouth every morning    Pancreatic adenocarcinoma (H)       polyethylene glycol Packet    MIRALAX/GLYCOLAX    30 packet    Take 17 g by mouth daily    Drug-induced constipation       prochlorperazine 10 MG tablet    COMPAZINE    30 tablet    Take 1 tablet (10 mg) by mouth every 6 hours as needed (Nausea/Vomiting)    Pancreatic adenocarcinoma (H)

## 2018-09-04 NOTE — NURSING NOTE
"Chief Complaint   Patient presents with     Port Draw     labs drawn via port. VS taken. Pt checked in for next appt     Port accessed with 20g 3/4\" gripper needle by RN, labs collected, line flushed with saline and heparin.  Vitals taken. Pt checked in for appointment(s).  "

## 2018-09-04 NOTE — TELEPHONE ENCOUNTER
Message from [employer]:  Original authorizing provider: MD Kitty Fried would like a refill of the following medications:  amylase-lipase-protease (CREON) [address] UNITS CPEP per EC capsule [Vito Sanches MD]    Preferred pharmacy: Other - [facility]    Comment:  Please send via Playspacecribe through the [facility] service. Thank you

## 2018-09-04 NOTE — PROGRESS NOTES
Infusion Nursing Note:  Kitty Bangura presents today for C4D8 Gemzar.    Patient seen by provider today: No   present during visit today: Not Applicable.    Note: Patient feels well. Denies fever/chills nor any signs of infection. Denies nausea/vomiting nor chest and abdominal discomfort. No new complaints made. Otherwise well.    Intravenous Access:  Implanted Port.    Treatment Conditions:  Lab Results   Component Value Date    HGB 11.0 09/04/2018     Lab Results   Component Value Date    WBC 5.6 09/04/2018      Lab Results   Component Value Date    ANEU 1.3 09/04/2018     Lab Results   Component Value Date     09/04/2018      Lab Results   Component Value Date     09/04/2018                   Lab Results   Component Value Date    POTASSIUM 4.3 09/04/2018           Lab Results   Component Value Date    MAG 2.3 07/16/2018            Lab Results   Component Value Date    CR 0.61 09/04/2018                   Lab Results   Component Value Date    ILIANA 8.6 09/04/2018                Lab Results   Component Value Date    BILITOTAL 0.4 09/04/2018           Lab Results   Component Value Date    ALBUMIN 3.4 09/04/2018                    Lab Results   Component Value Date    ALT 28 09/04/2018           Lab Results   Component Value Date    AST 26 09/04/2018       Results reviewed, labs MET treatment parameters, ok to proceed with treatment.      Post Infusion Assessment:  Patient tolerated infusion without incident.  Blood return noted pre and post infusion.  Site patent and intact, free from redness, edema or discomfort.  No evidence of extravasations.  Access discontinued per protocol.    Discharge Plan:   Patient declined prescription refills.  Discharge instructions reviewed with: Patient and Family.  Patient and/or family verbalized understanding of discharge instructions and all questions answered.  AVS to patient via [employer].  Patient will return 9/11/18 for next appointment.   Patient  discharged in stable condition accompanied by: self and .  Departure Mode: Ambulatory.    PAUL HEBERT RN

## 2018-09-05 ENCOUNTER — TELEPHONE (OUTPATIENT)
Dept: ONCOLOGY | Facility: CLINIC | Age: 58
End: 2018-09-05

## 2018-09-05 DIAGNOSIS — E11.42 TYPE 2 DIABETES MELLITUS WITH DIABETIC POLYNEUROPATHY, WITH LONG-TERM CURRENT USE OF INSULIN (H): ICD-10-CM

## 2018-09-05 DIAGNOSIS — Z79.4 TYPE 2 DIABETES MELLITUS WITHOUT COMPLICATION, WITH LONG-TERM CURRENT USE OF INSULIN (H): Primary | ICD-10-CM

## 2018-09-05 DIAGNOSIS — Z79.4 TYPE 2 DIABETES MELLITUS WITH DIABETIC POLYNEUROPATHY, WITH LONG-TERM CURRENT USE OF INSULIN (H): ICD-10-CM

## 2018-09-05 DIAGNOSIS — E11.9 TYPE 2 DIABETES MELLITUS WITHOUT COMPLICATION, WITH LONG-TERM CURRENT USE OF INSULIN (H): Primary | ICD-10-CM

## 2018-09-05 LAB — CANCER AG19-9 SERPL-ACNC: 2 U/ML (ref 0–37)

## 2018-09-05 RX ORDER — INSULIN HUMAN 100 [IU]/ML
INJECTION, SUSPENSION SUBCUTANEOUS
Qty: 30 ML | Refills: 3 | Status: SHIPPED | OUTPATIENT
Start: 2018-09-05 | End: 2018-09-11

## 2018-09-05 NOTE — ORAL ONC MGMT
Oral Chemotherapy Monitoring Program    Primary Oncologist: Dr. Monk  Primary Oncology Clinic: [facility]  Cancer Diagnosis: Pancreatic Cancer     Drug: Xeloda 1000 mg (2 x 500 mg) BID for 3 weeks, then 1 week off                    Start Date: C1D1=5/30/18  Last Cycle Start Date: C3D1=7/30/18     Drug Interaction Assessment: Xeloda + PPI = potential for decreased effectiveness Monitor.  Xeloda + Zofran = increased risk for QTcP  Compazine + Lorazepam = CNS Depressants may enhance the adverse/toxic effect of other CNS Depressants     Lab Monitoring Plan  C1D1+   CMP, CBC   C1D8+ Call   C1D15+     Monitoring plan for the 5 days per week x 5-6 weeks with XRT:   C1D1+   CMP, CBC   C1D8+ Call   Monitoring plan for Q3 week cycle:  CBC Q3 weeks  CMP Q3 weeks         Subjective/Objective:  Kitty Bangura is a 57 year old female contacted by phone for a follow-up visit for oral chemotherapy.  Kitty reports that she started this cycle on 8/28. She is having some constipation and plans to take miralax today and tomorrow. She then reports some diarrhea in week 2 of cycle and will stop the miralax. She states this has been the routine for the last cycle and she seems to have a good handle on controlling side effects. She states that nausea is a little worse this cycle and is needing more anti emetics. She does not have an appetite and is forcing herself to eat. She is eating small, bland meals throughout the day. She denies any peeling, pain of redness to hands and feet. She is walking a lot and trying to stay active and applying lotion multiple times a day. She is drinking good amounts of bottled and flavored water. She denies any questions at this time and will call with any concerns.     ORAL CHEMOTHERAPY 5/16/2018 7/2/2018 8/7/2018 9/5/2018   Drug Name Xeloda (Capecitabine) Xeloda (Capecitabine) Xeloda (Capecitabine) Xeloda (Capecitabine)   Current Dosage 1500mg 1500mg 1000mg 1000mg   Current Schedule BID BID  "BID BID   Cycle Details 3 weeks on 1 week off 3 weeks on 1 week off 3 weeks on 1 week off 3 weeks on 1 week off   Start Date of Last Cycle - 6/26/2018 7/30/2018 8/28/2018   Planned next cycle start date 5/30/2018 - 8/27/2018 9/25/2018   Doses missed in last 2 weeks - 0 0 0   Adherence Assessment - Adherent Adherent Adherent   Adverse Effects - Nausea Diarrhea Constipation;Diarrhea   Nausea - Grade 1 - -   Pharmacist Intervention(nausea) - No - -   Constipation - - - Grade 1   Pharmacist Intervention(constipation) - - - Yes   Intervention(s) - - - Patient education   Diarrhea - - Grade 1 Grade 1   Pharmacist Intervention(diarrhea) - - Yes Yes   Intervention(s) - - Patient education Patient education   Home BPs - - not needed -   Any new drug interactions? - No No No   Is the dose as ordered appropriate for the patient? - Yes Yes Yes       Last PHQ-2 Score on record:   PHQ-2 ( 1999 [employer]) 5/4/2018 3/1/2018   Q1: Little interest or pleasure in doing things 0 0   Q2: Feeling down, depressed or hopeless 0 0   PHQ-2 Score 0 0       Patient does not report depression symptoms.      Vitals:  BP:   BP Readings from Last 1 Encounters:   09/04/18 94/59     Wt Readings from Last 1 Encounters:   09/04/18 67.9 kg (149 lb 11.2 oz)     Estimated body surface area is 1.76 meters squared as calculated from the following:    Height as of 8/28/18: 1.651 m (5' 5\").    Weight as of 9/4/18: 67.9 kg (149 lb 11.2 oz).    Labs:  _  Result Component Current Result Ref Range   Sodium 139 (9/4/2018) 133 - 144 mmol/L     _  Result Component Current Result Ref Range   Potassium 4.3 (9/4/2018) 3.4 - 5.3 mmol/L     _  Result Component Current Result Ref Range   Calcium 8.6 (9/4/2018) 8.5 - 10.1 mg/dL     No results found for Mag within last 30 days.     No results found for Phos within last 30 days.     _  Result Component Current Result Ref Range   Albumin 3.4 (9/4/2018) 3.4 - 5.0 g/dL     _  Result Component Current Result Ref Range   Urea " Nitrogen 19 (9/4/2018) 7 - 30 mg/dL     _  Result Component Current Result Ref Range   Creatinine 0.61 (9/4/2018) 0.52 - 1.04 mg/dL       _  Result Component Current Result Ref Range   AST 26 (9/4/2018) 0 - 45 U/L     _  Result Component Current Result Ref Range   ALT 28 (9/4/2018) 0 - 50 U/L     _  Result Component Current Result Ref Range   Bilirubin Total 0.4 (9/4/2018) 0.2 - 1.3 mg/dL       _  Result Component Current Result Ref Range   WBC 5.6 (9/4/2018) 4.0 - 11.0 10e9/L     _  Result Component Current Result Ref Range   Hemoglobin 11.0 (L) (9/4/2018) 11.7 - 15.7 g/dL     _  Result Component Current Result Ref Range   Platelet Count 529 (H) (9/4/2018) 150 - 450 10e9/L     _  Result Component Current Result Ref Range   Absolute Neutrophil 1.3 (L) (9/4/2018) 1.6 - 8.3 10e9/L               Assessment:  Pt is tolerating current therapy with tolerable side effects.     Plan:  Continue with current therapy/     Follow-Up:  4 weeks for TM assessment.     Refill Due:  By 9/25 for C5D1    Ceci Tadeo RN  [facility]  [phone]

## 2018-09-11 ENCOUNTER — APPOINTMENT (OUTPATIENT)
Dept: LAB | Facility: CLINIC | Age: 58
End: 2018-09-11
Attending: INTERNAL MEDICINE
Payer: COMMERCIAL

## 2018-09-11 ENCOUNTER — INFUSION THERAPY VISIT (OUTPATIENT)
Dept: ONCOLOGY | Facility: CLINIC | Age: 58
End: 2018-09-11
Attending: INTERNAL MEDICINE
Payer: COMMERCIAL

## 2018-09-11 VITALS
TEMPERATURE: 97.7 F | HEART RATE: 77 BPM | SYSTOLIC BLOOD PRESSURE: 92 MMHG | OXYGEN SATURATION: 100 % | RESPIRATION RATE: 16 BRPM | BODY MASS INDEX: 24.79 KG/M2 | DIASTOLIC BLOOD PRESSURE: 59 MMHG | WEIGHT: 149 LBS

## 2018-09-11 DIAGNOSIS — C25.9 PANCREATIC ADENOCARCINOMA (H): Primary | ICD-10-CM

## 2018-09-11 LAB
ACANTHOCYTES BLD QL SMEAR: SLIGHT
ANISOCYTOSIS BLD QL SMEAR: ABNORMAL
BASOPHILS # BLD AUTO: 0 10E9/L (ref 0–0.2)
BASOPHILS NFR BLD AUTO: 0.9 %
BURR CELLS BLD QL SMEAR: SLIGHT
DIFFERENTIAL METHOD BLD: ABNORMAL
EOSINOPHIL # BLD AUTO: 0 10E9/L (ref 0–0.7)
EOSINOPHIL NFR BLD AUTO: 0 %
ERYTHROCYTE [DISTWIDTH] IN BLOOD BY AUTOMATED COUNT: 22.5 % (ref 10–15)
HCT VFR BLD AUTO: 31.1 % (ref 35–47)
HGB BLD-MCNC: 10.5 G/DL (ref 11.7–15.7)
HOWELL-JOLLY BOD BLD QL SMEAR: PRESENT
LYMPHOCYTES # BLD AUTO: 2.8 10E9/L (ref 0.8–5.3)
LYMPHOCYTES NFR BLD AUTO: 54 %
MCH RBC QN AUTO: 33.2 PG (ref 26.5–33)
MCHC RBC AUTO-ENTMCNC: 33.8 G/DL (ref 31.5–36.5)
MCV RBC AUTO: 98 FL (ref 78–100)
MONOCYTES # BLD AUTO: 0.6 10E9/L (ref 0–1.3)
MONOCYTES NFR BLD AUTO: 12.4 %
NEUTROPHILS # BLD AUTO: 1.7 10E9/L (ref 1.6–8.3)
NEUTROPHILS NFR BLD AUTO: 32.7 %
NRBC # BLD AUTO: 0.9 10*3/UL
NRBC BLD AUTO-RTO: 17 /100
OVALOCYTES BLD QL SMEAR: SLIGHT
PLATELET # BLD AUTO: 245 10E9/L (ref 150–450)
PLATELET # BLD EST: ABNORMAL 10*3/UL
POIKILOCYTOSIS BLD QL SMEAR: SLIGHT
RBC # BLD AUTO: 3.16 10E12/L (ref 3.8–5.2)
RBC INCLUSIONS BLD: SLIGHT
TARGETS BLD QL SMEAR: SLIGHT
WBC # BLD AUTO: 5.2 10E9/L (ref 4–11)

## 2018-09-11 PROCEDURE — 96413 CHEMO IV INFUSION 1 HR: CPT

## 2018-09-11 PROCEDURE — 25000128 H RX IP 250 OP 636: Mod: ZF | Performed by: PHYSICIAN ASSISTANT

## 2018-09-11 PROCEDURE — 96367 TX/PROPH/DG ADDL SEQ IV INF: CPT

## 2018-09-11 PROCEDURE — 25000128 H RX IP 250 OP 636: Mod: ZF | Performed by: INTERNAL MEDICINE

## 2018-09-11 PROCEDURE — 85025 COMPLETE CBC W/AUTO DIFF WBC: CPT | Performed by: PHYSICIAN ASSISTANT

## 2018-09-11 RX ORDER — HEPARIN SODIUM (PORCINE) LOCK FLUSH IV SOLN 100 UNIT/ML 100 UNIT/ML
5 SOLUTION INTRAVENOUS ONCE
Status: COMPLETED | OUTPATIENT
Start: 2018-09-11 | End: 2018-09-11

## 2018-09-11 RX ADMIN — SODIUM CHLORIDE 250 ML: 9 INJECTION, SOLUTION INTRAVENOUS at 10:51

## 2018-09-11 RX ADMIN — SODIUM CHLORIDE, PRESERVATIVE FREE 5 ML: 5 INJECTION INTRAVENOUS at 12:11

## 2018-09-11 RX ADMIN — DEXAMETHASONE SODIUM PHOSPHATE 8 MG: 10 INJECTION, SOLUTION INTRAMUSCULAR; INTRAVENOUS at 11:05

## 2018-09-11 RX ADMIN — Medication 5 ML: at 09:45

## 2018-09-11 RX ADMIN — GEMCITABINE 1800 MG: 38 INJECTION INTRAVENOUS at 11:36

## 2018-09-11 ASSESSMENT — PAIN SCALES - GENERAL: PAINLEVEL: NO PAIN (0)

## 2018-09-11 NOTE — PROGRESS NOTES
Infusion Nursing Note:  Kitty Bangura presents today for C4D15 Gemzar.    Patient seen by provider today: No   present during visit today: Not Applicable.    Note: Patient reported to clinic today with no new complaints.    Intravenous Access:  Implanted Port.    Treatment Conditions:  Lab Results   Component Value Date    HGB 10.5 09/11/2018     Lab Results   Component Value Date    WBC 5.2 09/11/2018      Lab Results   Component Value Date    ANEU 1.7 09/11/2018     Lab Results   Component Value Date     09/11/2018      Lab Results   Component Value Date     09/04/2018                   Lab Results   Component Value Date    POTASSIUM 4.3 09/04/2018           Lab Results   Component Value Date    MAG 2.3 07/16/2018            Lab Results   Component Value Date    CR 0.61 09/04/2018                   Lab Results   Component Value Date    ILIANA 8.6 09/04/2018                Lab Results   Component Value Date    BILITOTAL 0.4 09/04/2018           Lab Results   Component Value Date    ALBUMIN 3.4 09/04/2018                    Lab Results   Component Value Date    ALT 28 09/04/2018           Lab Results   Component Value Date    AST 26 09/04/2018       Results reviewed, labs MET treatment parameters, ok to proceed with treatment.      Post Infusion Assessment:  Patient tolerated infusion without incident.  Blood return noted pre and post infusion.  Site patent and intact, free from redness, edema or discomfort.  No evidence of extravasations.  Access discontinued per protocol.    Discharge Plan:   Patient declined prescription refills.  Discharge instructions reviewed with: Patient.  Patient and/or family verbalized understanding of discharge instructions and all questions answered.  AVS to patient via Go[employer].  Patient will return 9/17/18 for next appointment.   Patient discharged in stable condition accompanied by: self and .  Departure Mode: Ambulatory.  Face to Face time: 0  minutes.    Shyam Elkins RN

## 2018-09-11 NOTE — PATIENT INSTRUCTIONS
[facility] Main Line: [phone]    Call triage nurse with chills and/or temperature greater than or equal to 100.4, uncontrolled nausea/vomiting, diarrhea, constipation, dizziness, shortness of breath, chest pain, bleeding, unexplained bruising, or any new/concerning symptoms, questions/concerns.   If you are having any concerning symptoms or wish to speak to a provider before your next infusion visit, please call your care coordinator or triage to notify them so we can adequately serve you.   Triage Nurse Line: [phone]    If after hours, weekends, or holidays [phone]               September 2018 Sunday Monday Tuesday Wednesday Thursday Friday Saturday                                 1       2     3     4     [facility]    9:00 AM   (15 min.)    [facility]     [facility] ONC INFUSION 60    9:30 AM   (60 min.)   [facility] ON[facility] INFUSION   [facility] 5     6     7     8       9     10     11     [facility]    9:00 AM   (15 min.)    [facility] DRAW   [facility] Draw     [facility] ONC INFUSION 60    9:30 AM   (60 min.)    ONCOLOGY INFUSION   [facility] 12     13     14     15       16     17     CT CHEST/ABDOMEN/PELVIS W    9:15 AM   (30 min.)   [facility] [place] [address] CT 18     19     20     21     22       23     24     [facility]    7:30 AM   (15 min.)    [facility]     [facility] RETURN    8:00 AM   (30 min.)   Jovany Monk MD   [facility] 25     26     27     28     29       30 October 2018 Sunday Monday Tuesday Wednesday Thursday Friday Saturday        1     2     3     4     5     6       7     8     9     [facility] RETURN DIABETES    9:15 AM   (30 min.)   Latricia Bustillos PA-C   [facility] End[facility]in[facility] 10     11     12     13       14     15     16     17     18     19     20       21      22     23     24     25     26     27       28     29     30     31                                 Lab Results:  Recent Results (from the past 12 hour(s))   CBC with platelets differential    Collection Time: 09/11/18  9:51 AM   Result Value Ref Range    WBC 5.2 4.0 - 11.0 10e9/L    RBC Count 3.16 (L) 3.8 - 5.2 10e12/L    Hemoglobin 10.5 (L) 11.7 - 15.7 g/dL    Hematocrit 31.1 (L) 35.0 - 47.0 %    MCV 98 78 - 100 fl    MCH 33.2 (H) 26.5 - 33.0 pg    MCHC 33.8 31.5 - 36.5 g/dL    RDW 22.5 (H) 10.0 - 15.0 %    Platelet Count 245 150 - 450 10e9/L    Diff Method Manual Differential     % Neutrophils 32.7 %    % Lymphocytes 54.0 %    % Monocytes 12.4 %    % Eosinophils 0.0 %    % Basophils 0.9 %    Nucleated RBCs 17 (H) 0 /100    Absolute Neutrophil 1.7 1.6 - 8.3 10e9/L    Absolute Lymphocytes 2.8 0.8 - 5.3 10e9/L    Absolute Monocytes 0.6 0.0 - 1.3 10e9/L    Absolute Eosinophils 0.0 0.0 - 0.7 10e9/L    Absolute Basophils 0.0 0.0 - 0.2 10e9/L    Absolute Nucleated RBC 0.9     Anisocytosis Marked     Poikilocytosis Slight     RBC Fragments Slight     Acanthocytes Slight     Ovalocytes Slight     Misty Cells Slight     Target Cells Slight     Crandall [place] Bodies Present     Platelet Estimate Confirming automated cell count

## 2018-09-11 NOTE — MR AVS SNAPSHOT
After Visit Summary   9/11/2018    Kitty Bangura    MRN: [id number]           Patient Information     Date Of Birth          1960        Visit Information        Provider Department      9/11/2018 9:30 AM  21 ATC;  ONCOLOGY INFUSION [facility]        Today's Diagnoses     Pancreatic adenocarcinoma (H)    -  1      [facility] Main Line: [phone]    Call triage nurse with chills and/or temperature greater than or equal to 100.4, uncontrolled nausea/vomiting, diarrhea, constipation, dizziness, shortness of breath, chest pain, bleeding, unexplained bruising, or any new/concerning symptoms, questions/concerns.   If you are having any concerning symptoms or wish to speak to a provider before your next infusion visit, please call your care coordinator or triage to notify them so we can adequately serve you.   Triage Nurse Line: [phone]    If after hours, weekends, or holidays [phone]               September 2018 Sunday Monday Tuesday Wednesday Thursday Friday Saturday                                 1       2     3     4     [facility]    9:00 AM   (15 min.)    [facility] DRAW   [facility] Lab Draw     [facility] ONC INFUSION 60    9:30 AM   (60 min.)    ONCOLOGY INFUSION   [facility] 5     6     7     8       9     10     11     [facility]    9:00 AM   (15 min.)    [facility]     [facility] ONC INFUSION 60    9:30 AM   (60 min.)    ONCOLOGY INFUSION   [facility] 12     13     14     15       16     17     CT CHEST/ABDOMEN/PELVIS W    9:15 AM   (30 min.)   [address] CT 18     19     20     21     22       23     24     [facility]    7:30 AM   (15 min.)    [facility] DRAW   [facility] Lab Draw     [facility] RETURN    8:00 AM   (30 min.)   Jovany Monk MD   [facility] 25     26     27      28 29 30 October 2018 Sunday Monday Tuesday Wednesday Thursday Friday Saturday        1     2     3     4     5     6       7     8     9     [facility] RETURN DIABETES    9:15 AM   (30 min.)   Latricia Bustillos PA-C [facility] 10     11     12     13       14     15     16     17     18     19     20       21     22     23     24     25     26     27       28     29     30     31                                 Lab Results:  Recent Results (from the past 12 hour(s))   CBC with platelets differential    Collection Time: 09/11/18  9:51 AM   Result Value Ref Range    WBC 5.2 4.0 - 11.0 10e9/L    RBC Count 3.16 (L) 3.8 - 5.2 10e12/L    Hemoglobin 10.5 (L) 11.7 - 15.7 g/dL    Hematocrit 31.1 (L) 35.0 - 47.0 %    MCV 98 78 - 100 fl    MCH 33.2 (H) 26.5 - 33.0 pg    MCHC 33.8 31.5 - 36.5 g/dL    RDW 22.5 (H) 10.0 - 15.0 %    Platelet Count 245 150 - 450 10e9/L    Diff Method Manual Differential     % Neutrophils 32.7 %    % Lymphocytes 54.0 %    % Monocytes 12.4 %    % Eosinophils 0.0 %    % Basophils 0.9 %    Nucleated RBCs 17 (H) 0 /100    Absolute Neutrophil 1.7 1.6 - 8.3 10e9/L    Absolute Lymphocytes 2.8 0.8 - 5.3 10e9/L    Absolute Monocytes 0.6 0.0 - 1.3 10e9/L    Absolute Eosinophils 0.0 0.0 - 0.7 10e9/L    Absolute Basophils 0.0 0.0 - 0.2 10e9/L    Absolute Nucleated RBC 0.9     Anisocytosis Marked     Poikilocytosis Slight     RBC Fragments Slight     Acanthocytes Slight     Ovalocytes Slight     Misty Cells Slight     Target Cells Slight     Crandall [place] Bodies Present     Platelet Estimate Confirming automated cell count                Follow-ups after your visit        Your next 10 appointments already scheduled     Sep 17, 2018  9:15 AM CDT   CT CHEST/ABDOMEN/PELVIS W CONTRAST with [facility]   [facility] CT ([facility])    [address] [facility] [address] [phone]           How do I prepare for my exam?  (Food and drink instructions) To prepare: Do not eat or drink for 2 hours before your exam. If you need to take medicine, you may take it with small sips of water. (We may ask you to take liquid medicine as well.)  How do I prepare for my exam? (Other instructions) Please arrive 30 minutes early for your CT.  Once in the department you might be asked to drink water 15-20 minutes prior to your exam.  If indicated you may be asked to drink an oral contrast in advance of your CT.  If this is the case, the imaging team will let you know or be in contact with you prior to your appointment  Patients over 70 or patients with diabetes or kidney problems: If you haven t had a blood test (creatinine test) within the last 30 days, the Cardiologist/Radiologist may require you to get this test prior to your exam.  If you have diabetes:  Continue to take your metformin medication on the day of your exam  What should I wear: Please wear loose clothing, such as a sweat suit or jogging clothes. Avoid snaps, zippers and other metal. We may ask you to undress and put on a hospital gown.  How long does the exam take: Most scans take less than 20 minutes.  What should I bring: Please bring any scans or X-rays taken at other hospitals, if similar tests were done. Also bring a list of your medicines, including vitamins, minerals and over-the-counter drugs. It is safest to leave personal items at home.  Do I need a : No  is needed.  What do I need to tell my doctor? Be sure to tell your doctor: * If you have any allergies. * If there s any chance you are pregnant. * If you are breastfeeding.  What should I do after the exam: No restrictions, You may resume normal activities.  What is this test: A CT (computed tomography) scan is a series of pictures that allows us to look inside your body. The scanner creates images of the body in cross sections, much like slices of bread. This helps us see any problems more clearly. You may  receive contrast (X-ray dye) before or during your scan. You will be asked to drink the contrast.  Who should I call with questions: If you have any questions, please call the Imaging Department where you will have your exam. Directions, parking instructions, and other information is available on our website, [email or web address].            Sep 24, 2018  7:30 AM CDT   [facility] Draw with  [facility]   [facility])    [address] [place] [address]  [address] [place] [address]   [phone]            Sep 24, 2018  8:15 AM CDT   (Arrive by 8:00 AM)   Return Visit with Jovany Monk MD   [facility] ([facility])    [address] [place] [address]   [phone]            Oct 09, 2018  9:30 AM CDT   (Arrive by 9:15 AM)   RETURN DIABETES with Latricia Bustillos PA-C   [facility] ([facility])    [address] [place] [address]   [phone]            Jan 14, 2019  2:00 PM CST   (Arrive by 1:45 PM)   RETURN DIABETES with Es Gallo MD   [facility] ([facility])    [address] [place] [id number] [phone]              Who to contact     If you have questions or need follow up information about today's clinic visit or your schedule please contact [facility] directly at [phone].  Normal or non-critical lab and imaging results will be communicated to you by MyChart, letter or phone within 4 business days after the clinic has received the results. If you do not hear from us within 7 days, please contact the clinic through MyChart or phone. If you have a critical or abnormal lab result, we will notify you by phone as soon as possible.  Submit refill requests through [employer] or call your pharmacy and they will forward the refill  request to us. Please allow 3 business days for your refill to be completed.          Additional Information About Your Visit        [employer] Information     [employer] gives you secure access to your electronic health record. If you see a primary care provider, you can also send messages to your care team and make appointments. If you have questions, please call your primary care clinic.  If you do not have a primary care provider, please call [phone] and they will assist you.        Care EveryWhere ID     This is your Care EveryWhere ID. This could be used by other organizations to access your [facility] medical records  [id number]        Your Vitals Were     Pulse Temperature Respirations Pulse Oximetry BMI (Body Mass Index)       77 97.7  F (36.5  C) (Oral) 16 100% 24.79 kg/m2        Blood Pressure from Last 3 Encounters:   09/11/18 92/59   09/04/18 94/59   08/28/18 94/55    Weight from Last 3 Encounters:   09/11/18 67.6 kg (149 lb)   09/04/18 67.9 kg (149 lb 11.2 oz)   08/28/18 67.9 kg (149 lb 11.2 oz)              We Performed the Following     CBC with platelets differential          Today's Medication Changes          These changes are accurate as of 9/11/18 12:24 PM.  If you have any questions, ask your nurse or doctor.               These medicines have changed or have updated prescriptions.        Dose/Directions    insulin  UNIT/ML injection   Commonly known as:  HumuLIN N/NovoLIN N   This may have changed:  Another medication with the same name was removed. Continue taking this medication, and follow the directions you see here.   Used for:  Type 2 diabetes mellitus with diabetic polyneuropathy, with long-term current use of insulin (H)        Use as directed. Maximum 30 units daily.   Quantity:  2 vial   Refills:  3                Primary Care Provider Office Phone # Fax #    Solange Mcgill [phone] [address] [facility] [address]        Equal Access to  "Services     [facility]: Hadii aad ku hadmarileenina Sopaulo, wajosephineda luqadaha, qaybta kaalmafredy ferguson, re gerardo . So [facility] [phone].    ATENCIÓN: Si odessa vasquez, tiene a quiros disposición servicios gratuitos de asistencia lingüística. Llame al [phone].    We comply with applicable federal civil rights laws and [place] laws. We do not discriminate on the basis of race, color, national origin, age, disability, sex, sexual orientation, or gender identity.            Thank you!     Thank you for choosing [facility]  for your care. Our goal is always to provide you with excellent care. Hearing back from our patients is one way we can continue to improve our services. Please take a few minutes to complete the written survey that you may receive in the mail after your visit with us. Thank you!             Your Updated Medication List - Protect others around you: Learn how to safely use, store and throw away your medicines at www.disposemymeds.org.          This list is accurate as of 9/11/18 12:24 PM.  Always use your most recent med list.                   Brand Name Dispense Instructions for use Diagnosis    acetaminophen 500 MG tablet    TYLENOL    100 tablet    Take 2 tablets (1,000 mg) by mouth every 8 hours    Acute post-operative pain       amylase-lipase-protease 45190-12439 units Cpep per EC capsule    CREON    450 capsule    Take 2-3 with meals / 1-2 with snacks, up to 15 per day.    Necrotizing pancreatitis       BD insulin syringe ultrafine 31G X 5/16\" 0.3 ML   Generic drug:  insulin syringe-needle U-100           blood glucose monitoring test strip    ONETOUCH VERIO IQ    400 each    Use to test blood sugar 4 times daily or as directed.    Type 2 diabetes mellitus with diabetic polyneuropathy, with long-term current use of insulin (H)       capecitabine 500 MG tablet CHEMO    XELODA    22 tablet    Take 2 tablets (1,000 mg) by mouth 2 times daily for " 21 days Days 1 through 21, then 7 days off. Take within 30 mins after a meal.    Pancreatic adenocarcinoma (H)       docusate sodium 100 MG capsule    COLACE    60 capsule    Take 1 capsule (100 mg) by mouth daily    Other constipation       insulin  UNIT/ML injection    HumuLIN N/NovoLIN N    2 vial    Use as directed. Maximum 30 units daily.    Type 2 diabetes mellitus with diabetic polyneuropathy, with long-term current use of insulin (H)       insulin pen needle 32G X 4 MM    BD DOTTY U/F    100 each    Use 2 daily as directed.    Hyperglycemia, Malignant neoplasm of pancreas, unspecified location of malignancy (H)       insulin syringes (disposable) U-100 0.3 ML Misc     200 each    Use as directed. Two per day.    Type 2 diabetes mellitus with diabetic polyneuropathy, with long-term current use of insulin (H)       lidocaine (viscous) 2 % solution    XYLOCAINE    100 mL    Take 15 mLs by mouth every 2 hours as needed for moderate pain swish and spit every 3-8 hours as needed; max 8 doses/24 hour period    Mucositis due to chemotherapy, Pancreatic adenocarcinoma (H)       LORazepam 0.5 MG tablet    ATIVAN    30 tablet    Take 1 tablet (0.5 mg) by mouth every 4 hours as needed (Anxiety, Nausea/Vomiting or Sleep)    Pancreatic adenocarcinoma (H)       magic mouthwash suspension (diphenhydrAMINE, lidocaine, aluminum-magnesium & simethicone) Susp compounding kit     237 mL    Swish and swallow 5-10 mLs in mouth every 6 hours as needed for mouth sores    Mouth sores       nystatin [id number] UNIT/ML suspension    MYCOSTATIN    280 mL    Take 5 mLs (500,000 Units) by mouth 4 times daily Swish and swallow 4 times daily    Mouth sores       ondansetron 8 MG tablet    ZOFRAN    30 tablet    Take 1 tablet (8 mg) by mouth every 8 hours as needed for nausea    Pancreatic adenocarcinoma (H)       ONETOUCH DELICA LANCETS 33G Misc     100 each    4 lancets daily    Type 2 diabetes mellitus with diabetic polyneuropathy, with  long-term current use of insulin (H)       oxyCODONE 5 MG/5ML solution    ROXICODONE    473 mL    Take 5-10 mLs (5-10 mg) by mouth every 6 hours as needed for severe pain    Mucositis due to chemotherapy, Pancreatic adenocarcinoma (H)       pantoprazole 40 MG EC tablet    PROTONIX    30 tablet    Take 1 tablet (40 mg) by mouth every morning    Pancreatic adenocarcinoma (H)       polyethylene glycol Packet    MIRALAX/GLYCOLAX    30 packet    Take 17 g by mouth daily    Drug-induced constipation       prochlorperazine 10 MG tablet    COMPAZINE    30 tablet    Take 1 tablet (10 mg) by mouth every 6 hours as needed (Nausea/Vomiting)    Pancreatic adenocarcinoma (H)

## 2018-09-17 ENCOUNTER — HOSPITAL ENCOUNTER (OUTPATIENT)
Dept: CT IMAGING | Facility: CLINIC | Age: 58
Discharge: HOME | End: 2018-09-17
Attending: PHYSICIAN ASSISTANT | Admitting: PHYSICIAN ASSISTANT
Payer: COMMERCIAL

## 2018-09-17 DIAGNOSIS — C25.9 PANCREATIC ADENOCARCINOMA (H): ICD-10-CM

## 2018-09-17 PROCEDURE — 25000128 H RX IP 250 OP 636: Performed by: RADIOLOGY

## 2018-09-17 PROCEDURE — 25000125 ZZHC RX 250: Performed by: RADIOLOGY

## 2018-09-17 PROCEDURE — 71260 CT THORAX DX C+: CPT

## 2018-09-17 RX ORDER — IOPAMIDOL 755 MG/ML
82 INJECTION, SOLUTION INTRAVASCULAR ONCE
Status: COMPLETED | OUTPATIENT
Start: 2018-09-17 | End: 2018-09-17

## 2018-09-17 RX ADMIN — SODIUM CHLORIDE, PRESERVATIVE FREE 500 UNITS: 5 INJECTION INTRAVENOUS at 09:24

## 2018-09-17 RX ADMIN — IOPAMIDOL 82 ML: 755 INJECTION, SOLUTION INTRAVENOUS at 09:17

## 2018-09-17 RX ADMIN — SODIUM CHLORIDE 61 ML: 9 INJECTION, SOLUTION INTRAVENOUS at 09:17

## 2018-09-21 NOTE — PROGRESS NOTES
Oncology Follow-up Visit:  Sep 24, 2018    Cancer diagnosis: cystic pancreatic tail adenocarcinoma  small subcentimeter pulmonary nodules seen on staging scans of unclear etiology.     Treatment to date: neoadjuvant FOLFIRINOX x4 cycles, 1/15/18-3/6/18  Difficulty tolerating neoadjuvant intent chemotherapy. Distal pancreatectomy performed April 17, 2018, no residual carcinoma seen on operative pathology.  Treated with four cycles of adjuvant chemotherapy with gemcitabine and Xeloda, completed September 2018.     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     Referring physician: Dr. González Metz MD      Oncology History: Kitty Bangura is a 57 year old woman, previously healthy until she presented in early November 2017 with abdominal pain. CT abdomen on 11/1/17 showed acute pancreatitis (lipase 41,960) and a 3cm heterogenous pancreatic tail mass. The etiology of pancreatitis is unclear - no obstructing gallstone and not a heavy drinker. She was hospitalized for one week and followed up with Dr. González Metz in GI clinic.     11/29/17 -- endoscopic drainage of walled-off area of pancreatic necrosis by Dr. Metz. During the same procedure she had an EUS FNA of the pancreatic tail mass and pathology showed adenocarcinoma. The mass measured 33 x 27 mm, encapsulated with solid and cystic components, rim calcification, and no evidence of invasion.     12/9/17 -- Staging CT chest/abd/pelvis showed several small pulmonary nodules (largest 3mm), unchanged mass in the pancreatic tail, mildly prominent mesenteric nodes thought likely reactive, and small right hepatic lobe hypodensities. Abdominal MRI on 12/10/17 showed hepatic hemangiomas, no evidence of metastatic disease to the liver.     1/8/18 - - oncology consultation, Dr. Monk.  Recommendation: neoadjuvant intent chemotherapy with FOLFIRINOX.    1/15/18 - - cycle 1/day one FOLFIRINOX chemotherapy.    1/20 through 1/27/18 - - hospitalization at the [facility], for acute pancreatitis. Following three days of nausea, vomiting, pain. During this hospitalization: ERCP was attempted by Dr. Sanches with pancreatic stent placement, but pancreatic duct was completely obstructed and wire was unable to pass beyond the duct. Dr. Metz performed successful US guided cyst gastrostomy January 25, 2018.    1/31/18 - - oncology follow-up, DANTE Talavera. Neutropenic with ANC 0.4, thus defer cycle two of chemotherapy.    2/5/18 - - oncology follow-up, DANTE Junior.  Cycle 2/day one FOLFIRINOX chemotherapy. Patient endorses cold sensitivity secondary to oxaliplatin. Neulasta given for growth factor support. Due to significant neutropenia with cycle one.    2/11/18 - - ER evaluation for epigastric pain. Discharged to home from ER. Leukocytosis secondary to Neulasta given on February 8.    2/20/18 - - oncology follow-up, DANTE Junior. Cycle 3/day one FOLFIRINOX given without Neulasta. At patient request, oxaliplatin dose reduced by 10% due to neuropathy/cold sensitivity.    3/6/18 - - oncology follow-up, DANTE Talavera. Cycle 4/day one FOLFIRINOX chemotherapy.    3/13/18 - - CT chest/abdomen/pelvis - - IMPRESSION: 1. Minimal decrease in size and marked decrease in enhancement and pancreatic mass since the comparison study. 2. No significant change in low dense lesions in the liver since comparison.    3/16/18 - - oncology follow-up Dr. Monk. Plan is to hold on further chemotherapy as surgery is scheduled for 4/17/18 4/17/18 - - surgery: PROCEDURE: Diagnostic laparoscopy, splenic flexure mobilization, intraoperative ultrasound, distal pancreatectomy, splenectomy, and cholecystectomy. SURGEON: Ja Byrd MD  Final surgical pathology showed necrotizing  pancreatitis, no evidence of residual cancer, complete pathologic response, 26 benign lymph nodes.  FINAL DIAGNOSIS: A. DISTAL PANCREAS, SPLEEN AND OMENTUM, RESECTION: - Necrotizing pancreatitis with residual acellular mucin and foci of high grade dysplasia, status post neoadjuvant therapy   - Treatment response: complete (score 0)   - Twenty-six benign lymph nodes (0/26)   - Negative for residual carcinoma   - Pathologic staging: [id number]   - Focal infarction, metaplastic bone formation - Surgical margins are free of neoplasia - Unremarkable spleen and omentum   B. GALLBLADDER, CHOLECYSTECTOMY: - Benign gallbladder, biliary mucosa with no significant histologic abnormality - Negative for dysplasia COMMENT: Histologic sections demonstrate pools of acellular mucin, necrosis and scattered high grade dysplasia (PanIN-3) with no evidence of residual invasive carcinoma    4/30/18 - - surgical oncology follow-up, Dr. Byrd.  5/4/18 - - palliative care follow-up Dr. Snow. No specific recommendations required at this time.  5/14/18 - - oncology follow-up, Dr. Monk. Plan: adjuvant chemotherapy with gemcitabine and Xeloda.  5/30/18 - - cycle 1/day one adjuvant chemotherapy with gemcitabine and Xeloda.  Cycle three was dose reduced due to mucositis secondary to Xeloda.  8/28/18 - - oncology follow-up, DANTE Talavera. Cycle 4/day one gemcitabine and Xeloda.  9/17/18 - - CT chest/abdomen/pelvis - -IMPRESSION:  Resolving postoperative changes. No new findings or evidence of metastatic disease.  9/24/18 - - oncology follow-up, Dr. Monk.      Interim History:  Ms. Bangura is here today with her .  As of this point, she has completed adjuvant chemotherapy.  She had undergone the regimen of gemcitabine and Xeloda in the adjuvant setting that was done 05/30.  She had upfront chemotherapy with FOLFIRINOX and by time she had distal pancreatectomy in mid-April, there was no evidence of residual carcinoma seen on pathology,  "and I take that as a good prognostic feature.  She generally is feeling well with the fourth cycle of gemcitabine and Xeloda.  She had some general hand-foot syndrome, diarrhea and rash and then fatigue, but has largely subsided.  She is doing well.  On the recent CT chest, abdomen and pelvis done  shows no evidence of recurrent carcinoma.  She has no pain at this time.               Review Of Systems:  Full 10-point ROS reviewed. Pertinent symptoms reviewed above per HPI.    PAST MEDICAL HISTORY:   Pre-diabetes  Pancreatitis as above     PAST SURGICAL HISTORY:  Laparotomy x2 for ruptured tubal pregnancies    Discectomy for herniated lumbar disk  Breast reduction surgery     FAMILY HISTORY:  Colon cancer in maternal aunt  Paternal aunt and cousins with breast cancer  CAD in father and brother     SH: , from [place], with 29 yr-old son. Works as . former smoker, quit 30 years ago. two glasses of wine per night, none since pancreatitis diagnosis     Allergies: none      Current Outpatient Prescriptions:      acetaminophen (TYLENOL) 500 MG tablet, Take 2 tablets (1,000 mg) by mouth every 8 hours, Disp: 100 tablet, Rfl: 1     amylase-lipase-protease (CREON) 56196-06629 units CPEP per EC capsule, Take 2-3 with meals / 1-2 with snacks, up to 15 per day., Disp: 450 capsule, Rfl: 6     BD INSULIN SYRINGE ULTRAFINE 31G X \" 0.3 ML, , Disp: , Rfl:      blood glucose monitoring (ONETOUCH VERIO IQ) test strip, Use to test blood sugar 4 times daily or as directed., Disp: 400 each, Rfl: 3     capecitabine (XELODA) 500 MG tablet CHEMO, Take 2 tablets (1,000 mg) by mouth 2 times daily for 21 days Days 1 through 21, then 7 days off. Take within 30 mins after a meal., Disp: 22 tablet, Rfl: 0     docusate sodium (COLACE) 100 MG capsule, Take 1 capsule (100 mg) by mouth daily (Patient not taking: Reported on 2018), Disp: 60 capsule, Rfl: 1     insulin NPH (HUMULIN N/NOVOLIN N) 100 UNIT/ML " injection, Use as directed. Maximum 30 units daily., Disp: 2 vial, Rfl: 3     insulin pen needle (BD DOTTY U/F) 32G X 4 MM, Use 2 daily as directed., Disp: 100 each, Rfl: 11     insulin syringes, disposable, U-100 0.3 ML MISC, Use as directed. Two per day., Disp: 200 each, Rfl: 3     lidocaine, viscous, (XYLOCAINE) 2 % solution, Take 15 mLs by mouth every 2 hours as needed for moderate pain swish and spit every 3-8 hours as needed; max 8 doses/24 hour period (Patient not taking: Reported on 8/6/2018), Disp: 100 mL, Rfl: 1     LORazepam (ATIVAN) 0.5 MG tablet, Take 1 tablet (0.5 mg) by mouth every 4 hours as needed (Anxiety, Nausea/Vomiting or Sleep), Disp: 30 tablet, Rfl: 2     magic mouthwash (FIRST-MOUTHWASH BLM) suspension, Swish and swallow 5-10 mLs in mouth every 6 hours as needed for mouth sores (Patient not taking: Reported on 8/6/2018), Disp: 237 mL, Rfl: 1     nystatin (MYCOSTATIN) 671635 UNIT/ML suspension, Take 5 mLs (500,000 Units) by mouth 4 times daily Swish and swallow 4 times daily (Patient not taking: Reported on 7/30/2018), Disp: 280 mL, Rfl: 1     ondansetron (ZOFRAN) 8 MG tablet, Take 1 tablet (8 mg) by mouth every 8 hours as needed for nausea (Patient not taking: Reported on 8/28/2018), Disp: 30 tablet, Rfl: 0     ONETOUCH DELICA LANCETS 33G MISC, 4 lancets daily, Disp: 100 each, Rfl: 3     oxyCODONE (ROXICODONE) 5 MG/5ML solution, Take 5-10 mLs (5-10 mg) by mouth every 6 hours as needed for severe pain (Patient not taking: Reported on 8/6/2018), Disp: 473 mL, Rfl: 0     pantoprazole (PROTONIX) 40 MG EC tablet, Take 1 tablet (40 mg) by mouth every morning, Disp: 30 tablet, Rfl: 0     polyethylene glycol (MIRALAX/GLYCOLAX) Packet, Take 17 g by mouth daily, Disp: 30 packet, Rfl: 0     prochlorperazine (COMPAZINE) 10 MG tablet, Take 1 tablet (10 mg) by mouth every 6 hours as needed (Nausea/Vomiting), Disp: 30 tablet, Rfl: 2  No current facility-administered medications for this visit.      Facility-Administered Medications Ordered in Other Visits:      heparin 100 UNIT/ML injection 5 mL, 5 mL, Intracatheter, Once, Art Guevara MD      Physical Exam:    /51 (BP Location: Right arm, Patient Position: Sitting, Cuff Size: Adult Regular)  Pulse 75  Temp 97.6  F (36.4  C) (Oral)  Resp 16  Wt 67.5 kg (148 lb 12.8 oz)  SpO2 97%  BMI 24.76 kg/m2  KPS 90  GENERAL:  Very pleasant older  woman in no acute distress, alert and oriented x3.     HEENT:  Anicteric sclerae.  No evidence of mucositis or thrush.   LYMPHATICS:  No palpable lymphadenopathy in the head and neck.   CARDIOVASCULAR:  Regular rate and rhythm.  No S3, murmurs, gallops or rubs.   LUNGS:  Clear to auscultation bilaterally.   ABDOMEN:  A midline surgical scar is well healed.  No overlying fluctuance, erythema or tenderness.  The abdomen is otherwise soft, nontender.  There is no evidence of palpable masses.  No evidence of ascites.  No hepatomegaly.   EXTREMITIES:  No lower extremity edema.             Laboratory/Imaging Studies    Results for orders placed or performed in visit on 09/24/18   Comprehensive metabolic panel   Result Value Ref Range    Sodium 140 133 - 144 mmol/L    Potassium 4.2 3.4 - 5.3 mmol/L    Chloride 107 94 - 109 mmol/L    Carbon Dioxide 27 20 - 32 mmol/L    Anion Gap 6 3 - 14 mmol/L    Glucose 118 (H) 70 - 99 mg/dL    Urea Nitrogen 18 7 - 30 mg/dL    Creatinine 0.69 0.52 - 1.04 mg/dL    GFR Estimate 87 >60 mL/min/1.7m2    GFR Estimate If Black >90 >60 mL/min/1.7m2    Calcium 9.2 8.5 - 10.1 mg/dL    Bilirubin Total 0.6 0.2 - 1.3 mg/dL    Albumin 3.6 3.4 - 5.0 g/dL    Protein Total 7.4 6.8 - 8.8 g/dL    Alkaline Phosphatase 104 40 - 150 U/L    ALT 22 0 - 50 U/L    AST 25 0 - 45 U/L   *CBC with platelets differential   Result Value Ref Range    WBC 6.7 4.0 - 11.0 10e9/L    RBC Count 3.39 (L) 3.8 - 5.2 10e12/L    Hemoglobin 11.4 (L) 11.7 - 15.7 g/dL    Hematocrit 34.8 (L) 35.0 - 47.0 %    MCV  103 (H) 78 - 100 fl    MCH 33.6 (H) 26.5 - 33.0 pg    MCHC 32.8 31.5 - 36.5 g/dL    RDW 24.9 (H) 10.0 - 15.0 %    Platelet Count 474 (H) 150 - 450 10e9/L    Diff Method Automated Method     % Neutrophils 31.5 %    % Lymphocytes 41.1 %    % Monocytes 24.0 %    % Eosinophils 2.4 %    % Basophils 0.7 %    % Immature Granulocytes 0.3 %    Nucleated RBCs 2 (H) 0 /100    Absolute Neutrophil 2.1 1.6 - 8.3 10e9/L    Absolute Lymphocytes 2.7 0.8 - 5.3 10e9/L    Absolute Monocytes 1.6 (H) 0.0 - 1.3 10e9/L    Absolute Eosinophils 0.2 0.0 - 0.7 10e9/L    Absolute Basophils 0.1 0.0 - 0.2 10e9/L    Abs Immature Granulocytes 0.0 0 - 0.4 10e9/L    Absolute Nucleated RBC 0.1     Platelet Estimate Confirming automated cell count        Results for EZEQUIEL BANGURA (MRN [id number]) as of 9/21/2018 15:11   Ref. Range 1/15/2018 08:40 3/13/2018 09:20 5/22/2018 09:15 9/4/2018 09:08   Cancer Antigen 19-9 Latest Ref Range: 0 - 37 U/mL 12 10 7 2       RADIOLOGY:I personally reviewed the 09/17/2018 CT chest, abdomen and pelvis.  I printed out a copy of the report.  I reviewed it in full with the patient today.             ASSESSMENT/PLAN:  Ms. Bangura is a 57-year-old woman with resected pancreatic tail adenocarcinoma.  She had initial extensive and severe necrotizing pancreatitis upon initial diagnosis and hospitalization that delayed treatment.  We ultimately were able to begin neoadjuvant FOLFIRINOX for 4 cycles beginning in January.  By the time she had pancreatectomy by Dr. Byrd in mid April there was no evidence of residual carcinoma seen on operative pathology.  She completed subsequently 4 months of gemcitabine and Xeloda in the adjuvant setting.  There is no evidence of recurrent disease based on the recent CT scan now.  She will enter active surveillance.  We will get a CT chest, abdomen and pelvis and CA 19-9 in 3 months' time.  I answered her questions about CA 19-9 values which have never been elevated in her initial  presentation.  We discussed that she can call in the interim should there be any questions or new pain symptoms or other issues.  I told her it would be okay to get the flu shot.  She will reconnect with her primary care physician for noncancer-related primary care issues.  Also, she says that she is overdue for colonoscopy.  I stated that she can certainly have that done in the next 6 months standard of care routine surveillance for colon cancer screening.  I answered all of her and her 's other questions at this time.                 I spent 30 minutes in consultation, including H&P and Discussion. >50% of time was spent in counseling and in coordination of care.    Jovany Monk MD, PhD

## 2018-09-24 ENCOUNTER — ONCOLOGY VISIT (OUTPATIENT)
Dept: ONCOLOGY | Facility: CLINIC | Age: 58
End: 2018-09-24
Attending: INTERNAL MEDICINE
Payer: COMMERCIAL

## 2018-09-24 ENCOUNTER — APPOINTMENT (OUTPATIENT)
Dept: LAB | Facility: CLINIC | Age: 58
End: 2018-09-24
Attending: INTERNAL MEDICINE
Payer: COMMERCIAL

## 2018-09-24 VITALS
TEMPERATURE: 97.6 F | DIASTOLIC BLOOD PRESSURE: 51 MMHG | OXYGEN SATURATION: 97 % | SYSTOLIC BLOOD PRESSURE: 111 MMHG | WEIGHT: 148.8 LBS | HEART RATE: 75 BPM | RESPIRATION RATE: 16 BRPM | BODY MASS INDEX: 24.76 KG/M2

## 2018-09-24 DIAGNOSIS — C25.1 MALIGNANT NEOPLASM OF BODY OF PANCREAS (H): Primary | ICD-10-CM

## 2018-09-24 DIAGNOSIS — Z79.899 ENCOUNTER FOR LONG-TERM (CURRENT) USE OF MEDICATIONS: ICD-10-CM

## 2018-09-24 LAB
ALBUMIN SERPL-MCNC: 3.6 G/DL (ref 3.4–5)
ALP SERPL-CCNC: 104 U/L (ref 40–150)
ALT SERPL W P-5'-P-CCNC: 22 U/L (ref 0–50)
ANION GAP SERPL CALCULATED.3IONS-SCNC: 6 MMOL/L (ref 3–14)
AST SERPL W P-5'-P-CCNC: 25 U/L (ref 0–45)
BASOPHILS # BLD AUTO: 0.1 10E9/L (ref 0–0.2)
BASOPHILS NFR BLD AUTO: 0.7 %
BILIRUB SERPL-MCNC: 0.6 MG/DL (ref 0.2–1.3)
BUN SERPL-MCNC: 18 MG/DL (ref 7–30)
CALCIUM SERPL-MCNC: 9.2 MG/DL (ref 8.5–10.1)
CHLORIDE SERPL-SCNC: 107 MMOL/L (ref 94–109)
CO2 SERPL-SCNC: 27 MMOL/L (ref 20–32)
CREAT SERPL-MCNC: 0.69 MG/DL (ref 0.52–1.04)
DIFFERENTIAL METHOD BLD: ABNORMAL
EOSINOPHIL # BLD AUTO: 0.2 10E9/L (ref 0–0.7)
EOSINOPHIL NFR BLD AUTO: 2.4 %
ERYTHROCYTE [DISTWIDTH] IN BLOOD BY AUTOMATED COUNT: 24.9 % (ref 10–15)
GFR SERPL CREATININE-BSD FRML MDRD: 87 ML/MIN/1.7M2
GLUCOSE SERPL-MCNC: 118 MG/DL (ref 70–99)
HCT VFR BLD AUTO: 34.8 % (ref 35–47)
HGB BLD-MCNC: 11.4 G/DL (ref 11.7–15.7)
IMM GRANULOCYTES # BLD: 0 10E9/L (ref 0–0.4)
IMM GRANULOCYTES NFR BLD: 0.3 %
LYMPHOCYTES # BLD AUTO: 2.7 10E9/L (ref 0.8–5.3)
LYMPHOCYTES NFR BLD AUTO: 41.1 %
MCH RBC QN AUTO: 33.6 PG (ref 26.5–33)
MCHC RBC AUTO-ENTMCNC: 32.8 G/DL (ref 31.5–36.5)
MCV RBC AUTO: 103 FL (ref 78–100)
MONOCYTES # BLD AUTO: 1.6 10E9/L (ref 0–1.3)
MONOCYTES NFR BLD AUTO: 24 %
NEUTROPHILS # BLD AUTO: 2.1 10E9/L (ref 1.6–8.3)
NEUTROPHILS NFR BLD AUTO: 31.5 %
NRBC # BLD AUTO: 0.1 10*3/UL
NRBC BLD AUTO-RTO: 2 /100
PLATELET # BLD AUTO: 474 10E9/L (ref 150–450)
PLATELET # BLD EST: ABNORMAL 10*3/UL
POTASSIUM SERPL-SCNC: 4.2 MMOL/L (ref 3.4–5.3)
PROT SERPL-MCNC: 7.4 G/DL (ref 6.8–8.8)
RBC # BLD AUTO: 3.39 10E12/L (ref 3.8–5.2)
SODIUM SERPL-SCNC: 140 MMOL/L (ref 133–144)
WBC # BLD AUTO: 6.7 10E9/L (ref 4–11)

## 2018-09-24 PROCEDURE — 85025 COMPLETE CBC W/AUTO DIFF WBC: CPT | Performed by: INTERNAL MEDICINE

## 2018-09-24 PROCEDURE — G0463 HOSPITAL OUTPT CLINIC VISIT: HCPCS | Mod: ZF

## 2018-09-24 PROCEDURE — 99214 OFFICE O/P EST MOD 30 MIN: CPT | Mod: ZP | Performed by: INTERNAL MEDICINE

## 2018-09-24 PROCEDURE — 80053 COMPREHEN METABOLIC PANEL: CPT | Performed by: INTERNAL MEDICINE

## 2018-09-24 PROCEDURE — 36415 COLL VENOUS BLD VENIPUNCTURE: CPT

## 2018-09-24 ASSESSMENT — PAIN SCALES - GENERAL: PAINLEVEL: NO PAIN (0)

## 2018-09-24 NOTE — NURSING NOTE
"Oncology Rooming Note    September 24, 2018 7:41 AM   Kitty Bangura is a 57 year old female who presents for:    Chief Complaint   Patient presents with     Oncology Clinic Visit     Pancreatic CA; f/u     Blood Draw     labs drawn by venipuncture by rn.  vs taken.     Initial Vitals: /51 (BP Location: Right arm, Patient Position: Sitting, Cuff Size: Adult Regular)  Pulse 75  Temp 97.6  F (36.4  C) (Oral)  Resp 16  Wt 67.5 kg (148 lb 12.8 oz)  SpO2 97%  BMI 24.76 kg/m2 Estimated body mass index is 24.76 kg/(m^2) as calculated from the following:    Height as of 8/28/18: 1.651 m (5' 5\").    Weight as of this encounter: 67.5 kg (148 lb 12.8 oz). Body surface area is 1.76 meters squared.  No Pain (0) Comment: Data Unavailable   No LMP recorded. Patient is postmenopausal.  Allergies reviewed: Yes  Medications reviewed: Yes    Medications: Medication refills not needed today.  Pharmacy name entered into [facility]:    [id number] - [place], [place] - [address] [place] [address] [id number] - [place], [place] - [address] AT [facility] OF [address] [place] [address] HOME DELIVERY - [place], [place] - [address] [place] [address]    Clinical concerns: Patient states there are no new concerns to discuss with provider.  Dr Monk was not notified.       8 minutes for nursing intake (face to face time)     Ewelina Rodriguez CMA              "

## 2018-09-24 NOTE — NURSING NOTE
Chief Complaint   Patient presents with     Oncology Clinic Visit     Pancreatic CA; f/u     Blood Draw     labs drawn by venipuncture by rn.  vs taken.     Labs drawn by venipuncture by rn.  Vital signs taken.  Pt checked in to next appointment.  Abena Kunz RN

## 2018-09-24 NOTE — MR AVS SNAPSHOT
After Visit Summary   9/24/2018    Kitty Bangura    MRN: [id number]           Patient Information     Date Of Birth          1960        Visit Information        Provider Department      9/24/2018 8:15 AM Jovany Monk MD [facility]        Today's Diagnoses     Malignant neoplasm of body of pancreas (H)    -  1    Encounter for long-term (current) use of medications           Follow-ups after your visit        Follow-up notes from your care team     Return in about 3 months (around 12/24/2018).      Your next 10 appointments already scheduled     Oct 09, 2018  9:30 AM CDT   (Arrive by 9:15 AM)   RETURN DIABETES with FORREST Darnell [facility] ([facility])    [address]  3rd Floor  [facility] [address] [phone]            Dec 21, 2018  9:00 AM CST   Level O with ROOM 4 [facility] ([facility])    [facility]  [address]  [facility] [address]   [phone]            Dec 21, 2018  9:30 AM CST   (Arrive by 9:15 AM)   CT CHEST ABDOMEN PELVIS W/O & W CONTRAST with W[facility]CT1   [facility] CT ([facility])    [address] [place]  [facility] [address]   [phone]           How do I prepare for my exam? (Food and drink instructions) To prepare: Do not eat or drink for 2 hours before your exam. If you need to take medicine, you may take it with small sips of water. (We may ask you to take liquid medicine as well.)  How do I prepare for my exam? (Other instructions) Please arrive 30 minutes early for your CT.  Once in the department you might be asked to drink water 15-20 minutes prior to your exam.  If indicated you may be asked to drink an oral contrast in advance of your CT.  If this is the case, the imaging team will let you know or be in contact with you prior to your appointment  Patients over 70 or patients with diabetes or  kidney problems: If you haven t had a blood test (creatinine test) within the last 30 days, the Cardiologist/Radiologist may require you to get this test prior to your exam.  If you have diabetes:  Continue to take your metformin medication on the day of your exam  What should I wear: Please wear loose clothing, such as a sweat suit or jogging clothes. Avoid snaps, zippers and other metal. We may ask you to undress and put on a hospital gown.  How long does the exam take: Most scans take less than 20 minutes.  What should I bring: Please bring any scans or X-rays taken at other hospitals, if similar tests were done. Also bring a list of your medicines, including vitamins, minerals and over-the-counter drugs. It is safest to leave personal items at home.  Do I need a : No  is needed.  What do I need to tell my doctor? Be sure to tell your doctor: * If you have any allergies. * If there s any chance you are pregnant. * If you are breastfeeding.  What should I do after the exam: No restrictions, You may resume normal activities.  What is this test: A CT (computed tomography) scan is a series of pictures that allows us to look inside your body. The scanner creates images of the body in cross sections, much like slices of bread. This helps us see any problems more clearly. You may receive contrast (X-ray dye) before or during your scan. You will be asked to drink the contrast.  Who should I call with questions: If you have any questions, please call the Imaging Department where you will have your exam. Directions, parking instructions, and other information is available on our website, [email or web address].            Dec 28, 2018 10:45 AM CST   (Arrive by 10:30 AM)   Return Visit with Jovany Monk MD   [facility] ([facility])    [address]  [address]  [facility] [address] [phone]            Jan 14, 2019  2:00 PM CST   (Arrive by 1:45 PM)    RETURN DIABETES with Es Gallo MD   [facility] ([facility])    [address]  3rd [facility] [address] [phone]              Future tests that were ordered for you today     Open Future Orders        Priority Expected Expires Ordered    Cancer antigen 19-9 Routine 12/17/2018 2/21/2019 9/24/2018    CT Chest abdomen pelvis w & w/o contrast Routine 12/17/2018 2/21/2019 9/24/2018            Who to contact     If you have questions or need follow up information about today's clinic visit or your schedule please contact [facility] directly at [phone].  Normal or non-critical lab and imaging results will be communicated to you by Vyconhart, letter or phone within 4 business days after the clinic has received the results. If you do not hear from us within 7 days, please contact the clinic through Vyconhart or phone. If you have a critical or abnormal lab result, we will notify you by phone as soon as possible.  Submit refill requests through BiTaksi or call your pharmacy and they will forward the refill request to us. Please allow 3 business days for your refill to be completed.          Additional Information About Your Visit        VyconharTYMR Information     BiTaksi gives you secure access to your electronic health record. If you see a primary care provider, you can also send messages to your care team and make appointments. If you have questions, please call your primary care clinic.  If you do not have a primary care provider, please call [phone] and they will assist you.        Care EveryWhere ID     This is your Care EveryWhere ID. This could be used by other organizations to access your [facility] medical records  [id number]        Your Vitals Were     Pulse Temperature Respirations Pulse Oximetry BMI (Body Mass Index)       75 97.6  F (36.4  C) (Oral) 16 97% 24.76 kg/m2        Blood Pressure from Last 3 Encounters:    09/24/18 111/51   09/11/18 92/59   09/04/18 94/59    Weight from Last 3 Encounters:   09/24/18 67.5 kg (148 lb 12.8 oz)   09/11/18 67.6 kg (149 lb)   09/04/18 67.9 kg (149 lb 11.2 oz)              We Performed the Following     *CBC with platelets differential     Comprehensive metabolic panel        Primary Care Provider Office Phone # Fax #    Josenargis Julito Mcgill [phone] [address] [facility] [address]        Equal Access to Services     [facility] Center[facility] : Hadii zaheer donovan Sopaulo, waivy rasmussen, larisa ferguson, re gerardo . So [facility] [phone].    ATENCIÓN: Si habla español, tiene a quiros disposición servicios gratuitos de asistencia lingüística. [facility] [phone].    We comply with applicable federal civil rights laws and [place] laws. We do not discriminate on the basis of race, color, national origin, age, disability, sex, sexual orientation, or gender identity.            Thank you!     Thank you for choosing [facility]  for your care. Our goal is always to provide you with excellent care. Hearing back from our patients is one way we can continue to improve our services. Please take a few minutes to complete the written survey that you may receive in the mail after your visit with us. Thank you!             Your Updated Medication List - Protect others around you: Learn how to safely use, store and throw away your medicines at www.disposemymeds.org.          This list is accurate as of 9/24/18 11:59 PM.  Always use your most recent med list.                   Brand Name Dispense Instructions for use Diagnosis    acetaminophen 500 MG tablet    TYLENOL    100 tablet    Take 2 tablets (1,000 mg) by mouth every 8 hours    Acute post-operative pain       amylase-lipase-protease 73241-[address]412 units Cpep per EC capsule    CREON    450 capsule    Take 2-3 with meals / 1-2 with snacks, up to 15 per day.     "Necrotizing pancreatitis       BD insulin syringe ultrafine 31G X 5/16\" 0.3 ML   Generic drug:  insulin syringe-needle U-100           blood glucose monitoring test strip    ONETOUCH VERIO IQ    400 each    Use to test blood sugar 4 times daily or as directed.    Type 2 diabetes mellitus with diabetic polyneuropathy, with long-term current use of insulin (H)       capecitabine 500 MG tablet CHEMO    XELODA    22 tablet    Take 2 tablets (1,000 mg) by mouth 2 times daily for 21 days Days 1 through 21, then 7 days off. Take within 30 mins after a meal.    Pancreatic adenocarcinoma (H)       docusate sodium 100 MG capsule    COLACE    60 capsule    Take 1 capsule (100 mg) by mouth daily    Other constipation       insulin  UNIT/ML injection    HumuLIN N/NovoLIN N    2 vial    Use as directed. Maximum 30 units daily.    Type 2 diabetes mellitus with diabetic polyneuropathy, with long-term current use of insulin (H)       insulin pen needle 32G X 4 MM    BD DOTTY U/F    100 each    Use 2 daily as directed.    Hyperglycemia, Malignant neoplasm of pancreas, unspecified location of malignancy (H)       insulin syringes (disposable) U-100 0.3 ML Misc     200 each    Use as directed. Two per day.    Type 2 diabetes mellitus with diabetic polyneuropathy, with long-term current use of insulin (H)       lidocaine (viscous) 2 % solution    XYLOCAINE    100 mL    Take 15 mLs by mouth every 2 hours as needed for moderate pain swish and spit every 3-8 hours as needed; max 8 doses/24 hour period    Mucositis due to chemotherapy, Pancreatic adenocarcinoma (H)       LORazepam 0.5 MG tablet    ATIVAN    30 tablet    Take 1 tablet (0.5 mg) by mouth every 4 hours as needed (Anxiety, Nausea/Vomiting or Sleep)    Pancreatic adenocarcinoma (H)       magic mouthwash suspension (diphenhydrAMINE, lidocaine, aluminum-magnesium & simethicone) Susp compounding kit     237 mL    Swish and swallow 5-10 mLs in mouth every 6 hours as needed for " mouth sores    Mouth sores       nystatin 211591 UNIT/ML suspension    MYCOSTATIN    280 mL    Take 5 mLs (500,000 Units) by mouth 4 times daily Swish and swallow 4 times daily    Mouth sores       ondansetron 8 MG tablet    ZOFRAN    30 tablet    Take 1 tablet (8 mg) by mouth every 8 hours as needed for nausea    Pancreatic adenocarcinoma (H)       ONETOUCH DELICA LANCETS 33G Misc     100 each    4 lancets daily    Type 2 diabetes mellitus with diabetic polyneuropathy, with long-term current use of insulin (H)       oxyCODONE 5 MG/5ML solution    ROXICODONE    473 mL    Take 5-10 mLs (5-10 mg) by mouth every 6 hours as needed for severe pain    Mucositis due to chemotherapy, Pancreatic adenocarcinoma (H)       pantoprazole 40 MG EC tablet    PROTONIX    30 tablet    Take 1 tablet (40 mg) by mouth every morning    Pancreatic adenocarcinoma (H)       polyethylene glycol Packet    MIRALAX/GLYCOLAX    30 packet    Take 17 g by mouth daily    Drug-induced constipation       prochlorperazine 10 MG tablet    COMPAZINE    30 tablet    Take 1 tablet (10 mg) by mouth every 6 hours as needed (Nausea/Vomiting)    Pancreatic adenocarcinoma (H)

## 2018-09-24 NOTE — LETTER
9/24/2018       RE: Kitty Bangura  [id number] [facility] [address]     Dear Colleague,    Thank you for referring your patient, Kitty Bangura, to the [facility]. Please see a copy of my visit note below.    Oncology Follow-up Visit:  Sep 24, 2018    Cancer diagnosis: cystic pancreatic tail adenocarcinoma  small subcentimeter pulmonary nodules seen on staging scans of unclear etiology.     Treatment to date: neoadjuvant FOLFIRINOX x4 cycles, 1/15/18-3/6/18  Difficulty tolerating neoadjuvant intent chemotherapy. Distal pancreatectomy performed April 17, 2018, no residual carcinoma seen on operative pathology.  Treated with four cycles of adjuvant chemotherapy with gemcitabine and Xeloda, completed September 2018.     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     Referring physician: Dr. González Metz MD      Oncology History: Kitty Bangura is a 57 year old woman, previously healthy until she presented in early November 2017 with abdominal pain. CT abdomen on 11/1/17 showed acute pancreatitis (lipase 41,960) and a 3cm heterogenous pancreatic tail mass. The etiology of pancreatitis is unclear - no obstructing gallstone and not a heavy drinker. She was hospitalized for one week and followed up with Dr. González Metz in GI clinic.     11/29/17 -- endoscopic drainage of walled-off area of pancreatic necrosis by Dr. Metz. During the same procedure she had an EUS FNA of the pancreatic tail mass and pathology showed adenocarcinoma. The mass measured 33 x 27 mm, encapsulated with solid and cystic components, rim calcification, and no evidence of invasion.     12/9/17 -- Staging CT chest/abd/pelvis showed several small pulmonary nodules (largest 3mm), unchanged mass in the pancreatic tail, mildly prominent  mesenteric nodes thought likely reactive, and small right hepatic lobe hypodensities. Abdominal MRI on 12/10/17 showed hepatic hemangiomas, no evidence of metastatic disease to the liver.     1/8/18 - - oncology consultation, Dr. Monk. Recommendation: neoadjuvant intent chemotherapy with FOLFIRINOX.    1/15/18 - - cycle 1/day one FOLFIRINOX chemotherapy.    1/20 through 1/27/18 - - hospitalization at the [facility], for acute pancreatitis. Following three days of nausea, vomiting, pain. During this hospitalization: ERCP was attempted by Dr. Sanches with pancreatic stent placement, but pancreatic duct was completely obstructed and wire was unable to pass beyond the duct. Dr. Metz performed successful US guided cyst gastrostomy January 25, 2018.    1/31/18 - - oncology follow-up, DANTE Talavera. Neutropenic with ANC 0.4, thus defer cycle two of chemotherapy.    2/5/18 - - oncology follow-up, DANTE Junior.  Cycle 2/day one FOLFIRINOX chemotherapy. Patient endorses cold sensitivity secondary to oxaliplatin. Neulasta given for growth factor support. Due to significant neutropenia with cycle one.    2/11/18 - - ER evaluation for epigastric pain. Discharged to home from ER. Leukocytosis secondary to Neulasta given on February 8.    2/20/18 - - oncology follow-up, DANTE Junior. Cycle 3/day one FOLFIRINOX given without Neulasta. At patient request, oxaliplatin dose reduced by 10% due to neuropathy/cold sensitivity.    3/6/18 - - oncology follow-up, DANTE Talavera. Cycle 4/day one FOLFIRINOX chemotherapy.    3/13/18 - - CT chest/abdomen/pelvis - - IMPRESSION: 1. Minimal decrease in size and marked decrease in enhancement and pancreatic mass since the comparison study. 2. No significant change in low dense lesions in the liver since comparison.    3/16/18 - - oncology follow-up Dr. Monk. Plan is to hold on further chemotherapy as surgery is scheduled for 4/17/18 4/17/18 - -  surgery: PROCEDURE: Diagnostic laparoscopy, splenic flexure mobilization, intraoperative ultrasound, distal pancreatectomy, splenectomy, and cholecystectomy. SURGEON: Ja Byrd MD  Final surgical pathology showed necrotizing pancreatitis, no evidence of residual cancer, complete pathologic response, 26 benign lymph nodes.  FINAL DIAGNOSIS: A. DISTAL PANCREAS, SPLEEN AND OMENTUM, RESECTION: - Necrotizing pancreatitis with residual acellular mucin and foci of high grade dysplasia, status post neoadjuvant therapy   - Treatment response: complete (score 0)   - Twenty-six benign lymph nodes (0/26)   - Negative for residual carcinoma   - Pathologic staging: [id number]   - Focal infarction, metaplastic bone formation - Surgical margins are free of neoplasia - Unremarkable spleen and omentum   B. GALLBLADDER, CHOLECYSTECTOMY: - Benign gallbladder, biliary mucosa with no significant histologic abnormality - Negative for dysplasia COMMENT: Histologic sections demonstrate pools of acellular mucin, necrosis and scattered high grade dysplasia (PanIN-3) with no evidence of residual invasive carcinoma    4/30/18 - - surgical oncology follow-up, Dr. Byrd.  5/4/18 - - palliative care follow-up Dr. Snow. No specific recommendations required at this time.  5/14/18 - - oncology follow-up, Dr. oMnk. Plan: adjuvant chemotherapy with gemcitabine and Xeloda.  5/30/18 - - cycle 1/day one adjuvant chemotherapy with gemcitabine and Xeloda.  Cycle three was dose reduced due to mucositis secondary to Xeloda.  8/28/18 - - oncology follow-up, DANTE Talavera. Cycle 4/day one gemcitabine and Xeloda.  9/17/18 - - CT chest/abdomen/pelvis - -IMPRESSION:  Resolving postoperative changes. No new findings or evidence of metastatic disease.  9/24/18 - - oncology follow-up, Dr. Monk.      Interim History:  Ms. Bangura is here today with her .  As of this point, she has completed adjuvant chemotherapy.  She had undergone the regimen of  "gemcitabine and Xeloda in the adjuvant setting that was done .  She had upfront chemotherapy with FOLFIRINOX and by time she had distal pancreatectomy in mid-April, there was no evidence of residual carcinoma seen on pathology, and I take that as a good prognostic feature.  She generally is feeling well with the fourth cycle of gemcitabine and Xeloda.  She had some general hand-foot syndrome, diarrhea and rash and then fatigue, but has largely subsided.  She is doing well.  On the recent CT chest, abdomen and pelvis done  shows no evidence of recurrent carcinoma.  She has no pain at this time.               Review Of Systems:  Full 10-point ROS reviewed. Pertinent symptoms reviewed above per HPI.    PAST MEDICAL HISTORY:   Pre-diabetes  Pancreatitis as above     PAST SURGICAL HISTORY:  Laparotomy x2 for ruptured tubal pregnancies    Discectomy for herniated lumbar disk  Breast reduction surgery     FAMILY HISTORY:  Colon cancer in maternal aunt  Paternal aunt and cousins with breast cancer  CAD in father and brother     SH: , from [place], with 29 yr-old son. Works as . former smoker, quit 30 years ago. two glasses of wine per night, none since pancreatitis diagnosis     Allergies: none      Current Outpatient Prescriptions:      acetaminophen (TYLENOL) 500 MG tablet, Take 2 tablets (1,000 mg) by mouth every 8 hours, Disp: 100 tablet, Rfl: 1     amylase-lipase-protease (CREON) 96427-93103 units CPEP per EC capsule, Take 2-3 with meals / 1-2 with snacks, up to 15 per day., Disp: 450 capsule, Rfl: 6     BD INSULIN SYRINGE ULTRAFINE 31G X \" 0.3 ML, , Disp: , Rfl:      blood glucose monitoring (ONETOUCH VERIO IQ) test strip, Use to test blood sugar 4 times daily or as directed., Disp: 400 each, Rfl: 3     capecitabine (XELODA) 500 MG tablet CHEMO, Take 2 tablets (1,000 mg) by mouth 2 times daily for 21 days Days 1 through 21, then 7 days off. Take within 30 mins after a meal., " Disp: 22 tablet, Rfl: 0     docusate sodium (COLACE) 100 MG capsule, Take 1 capsule (100 mg) by mouth daily (Patient not taking: Reported on 8/6/2018), Disp: 60 capsule, Rfl: 1     insulin NPH (HUMULIN N/NOVOLIN N) 100 UNIT/ML injection, Use as directed. Maximum 30 units daily., Disp: 2 vial, Rfl: 3     insulin pen needle (BD DOTTY U/F) 32G X 4 MM, Use 2 daily as directed., Disp: 100 each, Rfl: 11     insulin syringes, disposable, U-100 0.3 ML MISC, Use as directed. Two per day., Disp: 200 each, Rfl: 3     lidocaine, viscous, (XYLOCAINE) 2 % solution, Take 15 mLs by mouth every 2 hours as needed for moderate pain swish and spit every 3-8 hours as needed; max 8 doses/24 hour period (Patient not taking: Reported on 8/6/2018), Disp: 100 mL, Rfl: 1     LORazepam (ATIVAN) 0.5 MG tablet, Take 1 tablet (0.5 mg) by mouth every 4 hours as needed (Anxiety, Nausea/Vomiting or Sleep), Disp: 30 tablet, Rfl: 2     magic mouthwash (FIRST-MOUTHWASH BLM) suspension, Swish and swallow 5-10 mLs in mouth every 6 hours as needed for mouth sores (Patient not taking: Reported on 8/6/2018), Disp: 237 mL, Rfl: 1     nystatin (MYCOSTATIN) 168526 UNIT/ML suspension, Take 5 mLs (500,000 Units) by mouth 4 times daily Swish and swallow 4 times daily (Patient not taking: Reported on 7/30/2018), Disp: 280 mL, Rfl: 1     ondansetron (ZOFRAN) 8 MG tablet, Take 1 tablet (8 mg) by mouth every 8 hours as needed for nausea (Patient not taking: Reported on 8/28/2018), Disp: 30 tablet, Rfl: 0     ONETOUCH DELICA LANCETS 33G MISC, 4 lancets daily, Disp: 100 each, Rfl: 3     oxyCODONE (ROXICODONE) 5 MG/5ML solution, Take 5-10 mLs (5-10 mg) by mouth every 6 hours as needed for severe pain (Patient not taking: Reported on 8/6/2018), Disp: 473 mL, Rfl: 0     pantoprazole (PROTONIX) 40 MG EC tablet, Take 1 tablet (40 mg) by mouth every morning, Disp: 30 tablet, Rfl: 0     polyethylene glycol (MIRALAX/GLYCOLAX) Packet, Take 17 g by mouth daily, Disp: 30 packet,  Rfl: 0     prochlorperazine (COMPAZINE) 10 MG tablet, Take 1 tablet (10 mg) by mouth every 6 hours as needed (Nausea/Vomiting), Disp: 30 tablet, Rfl: 2  No current facility-administered medications for this visit.     Facility-Administered Medications Ordered in Other Visits:      heparin 100 UNIT/ML injection 5 mL, 5 mL, Intracatheter, Once, Art Guevara MD      Physical Exam:    /51 (BP Location: Right arm, Patient Position: Sitting, Cuff Size: Adult Regular)  Pulse 75  Temp 97.6  F (36.4  C) (Oral)  Resp 16  Wt 67.5 kg (148 lb 12.8 oz)  SpO2 97%  BMI 24.76 kg/m2  KPS 90  GENERAL:  Very pleasant older  woman in no acute distress, alert and oriented x3.     HEENT:  Anicteric sclerae.  No evidence of mucositis or thrush.   LYMPHATICS:  No palpable lymphadenopathy in the head and neck.   CARDIOVASCULAR:  Regular rate and rhythm.  No S3, murmurs, gallops or rubs.   LUNGS:  Clear to auscultation bilaterally.   ABDOMEN:  A midline surgical scar is well healed.  No overlying fluctuance, erythema or tenderness.  The abdomen is otherwise soft, nontender.  There is no evidence of palpable masses.  No evidence of ascites.  No hepatomegaly.   EXTREMITIES:  No lower extremity edema.             Laboratory/Imaging Studies    Results for orders placed or performed in visit on 09/24/18   Comprehensive metabolic panel   Result Value Ref Range    Sodium 140 133 - 144 mmol/L    Potassium 4.2 3.4 - 5.3 mmol/L    Chloride 107 94 - 109 mmol/L    Carbon Dioxide 27 20 - 32 mmol/L    Anion Gap 6 3 - 14 mmol/L    Glucose 118 (H) 70 - 99 mg/dL    Urea Nitrogen 18 7 - 30 mg/dL    Creatinine 0.69 0.52 - 1.04 mg/dL    GFR Estimate 87 >60 mL/min/1.7m2    GFR Estimate If Black >90 >60 mL/min/1.7m2    Calcium 9.2 8.5 - 10.1 mg/dL    Bilirubin Total 0.6 0.2 - 1.3 mg/dL    Albumin 3.6 3.4 - 5.0 g/dL    Protein Total 7.4 6.8 - 8.8 g/dL    Alkaline Phosphatase 104 40 - 150 U/L    ALT 22 0 - 50 U/L    AST 25 0 - 45 U/L    *CBC with platelets differential   Result Value Ref Range    WBC 6.7 4.0 - 11.0 10e9/L    RBC Count 3.39 (L) 3.8 - 5.2 10e12/L    Hemoglobin 11.4 (L) 11.7 - 15.7 g/dL    Hematocrit 34.8 (L) 35.0 - 47.0 %     (H) 78 - 100 fl    MCH 33.6 (H) 26.5 - 33.0 pg    MCHC 32.8 31.5 - 36.5 g/dL    RDW 24.9 (H) 10.0 - 15.0 %    Platelet Count 474 (H) 150 - 450 10e9/L    Diff Method Automated Method     % Neutrophils 31.5 %    % Lymphocytes 41.1 %    % Monocytes 24.0 %    % Eosinophils 2.4 %    % Basophils 0.7 %    % Immature Granulocytes 0.3 %    Nucleated RBCs 2 (H) 0 /100    Absolute Neutrophil 2.1 1.6 - 8.3 10e9/L    Absolute Lymphocytes 2.7 0.8 - 5.3 10e9/L    Absolute Monocytes 1.6 (H) 0.0 - 1.3 10e9/L    Absolute Eosinophils 0.2 0.0 - 0.7 10e9/L    Absolute Basophils 0.1 0.0 - 0.2 10e9/L    Abs Immature Granulocytes 0.0 0 - 0.4 10e9/L    Absolute Nucleated RBC 0.1     Platelet Estimate Confirming automated cell count        Results for EZEQUIEL BANGURA (MRN [id number]) as of 9/21/2018 15:11   Ref. Range 1/15/2018 08:40 3/13/2018 09:20 5/22/2018 09:15 9/4/2018 09:08   Cancer Antigen 19-9 Latest Ref Range: 0 - 37 U/mL 12 10 7 2       RADIOLOGY:I personally reviewed the 09/17/2018 CT chest, abdomen and pelvis.  I printed out a copy of the report.  I reviewed it in full with the patient today.             ASSESSMENT/PLAN:  Ms. Bangura is a 57-year-old woman with resected pancreatic tail adenocarcinoma.  She had initial extensive and severe necrotizing pancreatitis upon initial diagnosis and hospitalization that delayed treatment.  We ultimately were able to begin neoadjuvant FOLFIRINOX for 4 cycles beginning in January.  By the time she had pancreatectomy by Dr. Byrd in mid April there was no evidence of residual carcinoma seen on operative pathology.  She completed subsequently 4 months of gemcitabine and Xeloda in the adjuvant setting.  There is no evidence of recurrent disease based on the recent CT scan now.   She will enter active surveillance.  We will get a CT chest, abdomen and pelvis and CA 19-9 in 3 months' time.  I answered her questions about CA 19-9 values which have never been elevated in her initial presentation.  We discussed that she can call in the interim should there be any questions or new pain symptoms or other issues.  I told her it would be okay to get the flu shot.  She will reconnect with her primary care physician for noncancer-related primary care issues.  Also, she says that she is overdue for colonoscopy.  I stated that she can certainly have that done in the next 6 months standard of care routine surveillance for colon cancer screening.  I answered all of her and her 's other questions at this time.       I spent 30 minutes in consultation, including H&P and Discussion. >50% of time was spent in counseling and in coordination of care.    Jovany Monk MD, PhD

## 2018-10-05 ENCOUNTER — OFFICE VISIT (OUTPATIENT)
Dept: FAMILY MEDICINE | Facility: CLINIC | Age: 58
End: 2018-10-05
Payer: COMMERCIAL

## 2018-10-05 VITALS
DIASTOLIC BLOOD PRESSURE: 60 MMHG | OXYGEN SATURATION: 98 % | TEMPERATURE: 97.6 F | HEART RATE: 67 BPM | RESPIRATION RATE: 16 BRPM | WEIGHT: 148.2 LBS | HEIGHT: 65 IN | BODY MASS INDEX: 24.69 KG/M2 | SYSTOLIC BLOOD PRESSURE: 92 MMHG

## 2018-10-05 DIAGNOSIS — E11.9 TYPE 2 DIABETES MELLITUS WITHOUT COMPLICATION, WITH LONG-TERM CURRENT USE OF INSULIN (H): ICD-10-CM

## 2018-10-05 DIAGNOSIS — Z00.00 ENCOUNTER FOR ROUTINE ADULT HEALTH EXAMINATION WITHOUT ABNORMAL FINDINGS: Primary | ICD-10-CM

## 2018-10-05 DIAGNOSIS — Z13.220 LIPID SCREENING: ICD-10-CM

## 2018-10-05 DIAGNOSIS — Z79.4 TYPE 2 DIABETES MELLITUS WITHOUT COMPLICATION, WITH LONG-TERM CURRENT USE OF INSULIN (H): ICD-10-CM

## 2018-10-05 DIAGNOSIS — Z12.4 SCREENING FOR CERVICAL CANCER: ICD-10-CM

## 2018-10-05 DIAGNOSIS — Z12.11 SPECIAL SCREENING FOR MALIGNANT NEOPLASMS, COLON: ICD-10-CM

## 2018-10-05 LAB
CHOLEST SERPL-MCNC: 185 MG/DL
HDLC SERPL-MCNC: 56 MG/DL
LDLC SERPL CALC-MCNC: 108 MG/DL
NONHDLC SERPL-MCNC: 129 MG/DL
TRIGL SERPL-MCNC: 107 MG/DL

## 2018-10-05 PROCEDURE — 80061 LIPID PANEL: CPT | Performed by: FAMILY MEDICINE

## 2018-10-05 PROCEDURE — G0145 SCR C/V CYTO,THINLAYER,RESCR: HCPCS | Performed by: FAMILY MEDICINE

## 2018-10-05 PROCEDURE — 36415 COLL VENOUS BLD VENIPUNCTURE: CPT | Performed by: FAMILY MEDICINE

## 2018-10-05 PROCEDURE — 99396 PREV VISIT EST AGE 40-64: CPT | Performed by: FAMILY MEDICINE

## 2018-10-05 PROCEDURE — 87624 HPV HI-RISK TYP POOLED RSLT: CPT | Performed by: FAMILY MEDICINE

## 2018-10-05 ASSESSMENT — ENCOUNTER SYMPTOMS
DISTURBANCES IN COORDINATION: 0
NECK MASS: 0
HEMATURIA: 0
LEG PAIN: 0
INSOMNIA: 0
EYE PAIN: 1
HYPERTENSION: 0
DOUBLE VISION: 0
DIFFICULTY URINATING: 1
SPEECH CHANGE: 0
LOSS OF CONSCIOUSNESS: 0
SMELL DISTURBANCE: 0
TASTE DISTURBANCE: 0
HYPOTENSION: 1
TINGLING: 0
SORE THROAT: 0
TROUBLE SWALLOWING: 0
TREMORS: 0
HOARSE VOICE: 0
SINUS CONGESTION: 0
DYSURIA: 0
DECREASED CONCENTRATION: 1
SEIZURES: 0
MEMORY LOSS: 1
SLEEP DISTURBANCES DUE TO BREATHING: 0
LIGHT-HEADEDNESS: 1
PARALYSIS: 0
EYE IRRITATION: 0
FLANK PAIN: 0
PALPITATIONS: 0
ORTHOPNEA: 0
EYE REDNESS: 0
HEADACHES: 0
SINUS PAIN: 0
PANIC: 0
NERVOUS/ANXIOUS: 0
EYE WATERING: 0
SYNCOPE: 0
DEPRESSION: 0
EXERCISE INTOLERANCE: 0
NUMBNESS: 0
DIZZINESS: 1
WEAKNESS: 0

## 2018-10-05 NOTE — PROGRESS NOTES
SUBJECTIVE:   CC: Kitty Bangura is an 57 year old woman who presents for preventive health visit.     Healthy Habits:    Do you get at least three servings of calcium containing foods daily (dairy, green leafy vegetables, etc.)? yes    Amount of exercise or daily activities, outside of work: 7 day(s) per week    Problems taking medications regularly No    Medication side effects: No    Have you had an eye exam in the past two years? No    Do you see a dentist twice per year? yes    Do you have sleep apnea, excessive snoring or daytime drowsiness?no      Patient is a 57 yr old female with past medical history that is significant for Type II diabetes, history of pancreatic adenocarcinoma . She comes in for her annual physical. She denies any acute symptoms and has been feeling well since her surgery. She is open to having labs and her colonoscopy done. She was reminded of an eye examination .     Today's PHQ-2 Score:   PHQ-2 ( 1999 [employer]) 10/5/2018 5/4/2018   Q1: Little interest or pleasure in doing things 0 0   Q2: Feeling down, depressed or hopeless 0 0   PHQ-2 Score 0 0       Abuse: Current or Past(Physical, Sexual or Emotional)- No  Do you feel safe in your environment - Yes    Social History   Substance Use Topics     Smoking status: Former Smoker     Packs/day: 0.50     Years: 4.00     Types: Cigarettes     Start date: 1/1/1974     Quit date: 1981     Smokeless tobacco: Never Used     Alcohol use No      Comment: Now quit since Oct 31.  Moderate drinker in past     If you drink alcohol do you typically have >3 drinks per day or >7 drinks per week? No                     Reviewed orders with patient.  Reviewed health maintenance and updated orders accordingly - Yes  Labs reviewed in EPIC  BP Readings from Last 3 Encounters:   10/05/18 92/60   09/24/18 111/51   09/11/18 92/59    Wt Readings from Last 3 Encounters:   10/05/18 148 lb 3.2 oz (67.2 kg)   09/24/18 148 lb 12.8 oz (67.5 kg)   09/11/18 149 lb (67.6  kg)                  Patient Active Problem List   Diagnosis     Acute pancreatitis     Leukocytosis     Fatty liver     Pancreatic mass     Pancreatitis     Necrotizing pancreatitis     Pancreatic adenocarcinoma (H)     Dehydration     Hypernatremia     Hypotension     Anemia     Thrombocytopenia (H)     Hyperglycemia     Diabetes mellitus, type 2 (H)     Past Surgical History:   Procedure Laterality Date     ABDOMEN SURGERY  1983    Ruptured tubal pregnancies, additional surgeries followed     BACK SURGERY  2004    L5, S1 discectomy     BREAST SURGERY  2003    Breast reduction     COLONOSCOPY  2008     ENDOSCOPIC RETROGRADE CHOLANGIOPANCREATOGRAM N/A 1/25/2018    Procedure: COMBINED ENDOSCOPIC RETROGRADE CHOLANGIOPANCREATOGRAPHY, PLACE TUBE/STENT;  Endoscopic retrograde cholangiopancreatogram with stent placement and cyst drainage bile ;  Surgeon: Vito Sanches MD;  [facility]     ENDOSCOPIC ULTRASOUND LOWER GASTROINTESTIONAL TRACT (GI) N/A 1/25/2018    Procedure: ENDOSCOPIC ULTRASOUND LOWER GASTROINTESTIONAL TRACT (GI);  Endoscopic Ultrasound with guided Cyst-Gastrostomy;  Surgeon: González Metz MD;  [facility]     ENDOSCOPIC ULTRASOUND, ESOPHAGOSCOPY, GASTROSCOPY, DUODENOSCOPY (EGD), NECROSECTOMY N/A 12/4/2017    Procedure: ENDOSCOPIC ULTRASOUND, ESOPHAGOSCOPY, GASTROSCOPY, DUODENOSCOPY (EGD), NECROSECTOMY;  Esophagogastroduodenoscopy, with  Necrosectomy and stents replacement  ;  Surgeon: González Metz MD;  [facility]     ENDOSCOPIC ULTRASOUND, ESOPHAGOSCOPY, GASTROSCOPY, DUODENOSCOPY (EGD), NECROSECTOMY N/A 12/12/2017    Procedure: ENDOSCOPIC ULTRASOUND, ESOPHAGOSCOPY, GASTROSCOPY, DUODENOSCOPY (EGD), NECROSECTOMY;  Esophagogastroduodenoscopy with Necrosectomy, Balloon Dilation, stent removal X3 and Cystgastrostomy stent Placement X2;  Surgeon: Guru Cecilia Staples MD;  [facility]     ESOPHAGOSCOPY, GASTROSCOPY, DUODENOSCOPY (EGD), COMBINED N/A  2017    Procedure: COMBINED ENDOSCOPIC ULTRASOUND, ESOPHAGOSCOPY, GASTROSCOPY, DUODENOSCOPY (EGD), FINE NEEDLE ASPIRATE/BIOPSY;  Endoscopic Ultrasound with cystgastrostomy and fine needle aspiration ;  Surgeon: González Metz MD;  Location:  OR     ESOPHAGOSCOPY, GASTROSCOPY, DUODENOSCOPY (EGD), COMBINED N/A 2018    Procedure: COMBINED ESOPHAGOSCOPY, GASTROSCOPY, DUODENOSCOPY (EGD);  EGD;  Surgeon: González Metz MD;  Location:  GI     ESOPHAGOSCOPY, GASTROSCOPY, DUODENOSCOPY (EGD), COMBINED N/A 2018    Procedure: COMBINED ESOPHAGOSCOPY, GASTROSCOPY, DUODENOSCOPY (EGD), REMOVE FOREIGN BODY;;  Surgeon: González Metz MD;  Location:  GI     GYN SURGERY  1983    Multiple ectopic pregnancies, reconnect,       INSERT PORT VASCULAR ACCESS Right 2018    Procedure: INSERT PORT VASCULAR ACCESS;  Chest Port Placement - right;  Surgeon: Chanda Lawson PA-C;  [facility]     LAPAROSCOPY DIAGNOSTIC (GENERAL) N/A 2018    Procedure: LAPAROSCOPY DIAGNOSTIC (GENERAL);  Diagnostic Laparoscopy; Open Distal Pancreatectomy And Splenectomy, splenic flexure mobilization, cholecystectomy, intraoperative ultrasound;  Surgeon: Ja Byrd MD;  [facility]     PANCREATECTOMY, SPLENECTOMY N/A 2018    Procedure: PANCREATECTOMY, SPLENECTOMY;;  Surgeon: Ja Byrd MD;  Location:  OR       Social History   Substance Use Topics     Smoking status: Former Smoker     Packs/day: 0.50     Years: 4.00     Types: Cigarettes     Start date: 1974     Quit date:      Smokeless tobacco: Never Used     Alcohol use No      Comment: Now quit since Oct 31.  Moderate drinker in past     Family History   Problem Relation Age of Onset     HEART DISEASE Father      Hyperlipidemia Father      HEART DISEASE Brother      Diabetes Mother      Hypertension Mother      Obesity Mother      Diabetes Maternal Grandfather      Hypertension Maternal Grandfather      Obesity  "Maternal Grandfather      Diabetes Sister      Hypertension Sister      Depression Sister      Anxiety Disorder Sister      Obesity Sister      Hypertension Brother      Hyperlipidemia Brother      Breast Cancer Cousin      Breast Cancer Cousin      Breast Cancer Cousin      Colon Cancer Other      Other Cancer Other      Mesothelioma     Depression Sister      Anxiety Disorder Brother      Anxiety Disorder Son      Mental Illness Sister      Schizophrenia     Obesity Maternal Grandmother      Obesity Sister          Current Outpatient Prescriptions   Medication Sig Dispense Refill     amylase-lipase-protease (CREON) 01496-86338 units CPEP per EC capsule Take 2-3 with meals / 1-2 with snacks, up to 15 per day. 450 capsule 6     BD INSULIN SYRINGE ULTRAFINE 31G X 5/16\" 0.3 ML        blood glucose monitoring (ONETOUCH VERIO IQ) test strip Use to test blood sugar 4 times daily or as directed. 400 each 3     insulin NPH (HUMULIN N/NOVOLIN N) 100 UNIT/ML injection Use as directed. Maximum 30 units daily. 2 vial 3     insulin pen needle (BD DOTTY U/F) 32G X 4 MM Use 2 daily as directed. 100 each 11     insulin syringes, disposable, U-100 0.3 ML MISC Use as directed. Two per day. 200 each 3     pantoprazole (PROTONIX) 40 MG EC tablet Take 1 tablet (40 mg) by mouth every morning 30 tablet 0     acetaminophen (TYLENOL) 500 MG tablet Take 2 tablets (1,000 mg) by mouth every 8 hours (Patient not taking: Reported on 10/5/2018) 100 tablet 1     capecitabine (XELODA) 500 MG tablet CHEMO Take 2 tablets (1,000 mg) by mouth 2 times daily for 21 days Days 1 through 21, then 7 days off. Take within 30 mins after a meal. 22 tablet 0     docusate sodium (COLACE) 100 MG capsule Take 1 capsule (100 mg) by mouth daily (Patient not taking: Reported on 8/6/2018) 60 capsule 1     lidocaine, viscous, (XYLOCAINE) 2 % solution Take 15 mLs by mouth every 2 hours as needed for moderate pain swish and spit every 3-8 hours as needed; max 8 doses/24 " hour period (Patient not taking: Reported on 8/6/2018) 100 mL 1     LORazepam (ATIVAN) 0.5 MG tablet Take 1 tablet (0.5 mg) by mouth every 4 hours as needed (Anxiety, Nausea/Vomiting or Sleep) (Patient not taking: Reported on 9/24/2018) 30 tablet 2     magic mouthwash (FIRST-MOUTHWASH BLM) suspension Swish and swallow 5-10 mLs in mouth every 6 hours as needed for mouth sores (Patient not taking: Reported on 8/6/2018) 237 mL 1     nystatin (MYCOSTATIN) 058600 UNIT/ML suspension Take 5 mLs (500,000 Units) by mouth 4 times daily Swish and swallow 4 times daily (Patient not taking: Reported on 7/30/2018) 280 mL 1     ondansetron (ZOFRAN) 8 MG tablet Take 1 tablet (8 mg) by mouth every 8 hours as needed for nausea (Patient not taking: Reported on 8/28/2018) 30 tablet 0     ONETOUCH DELICA LANCETS 33G MISC 4 lancets daily 100 each 3     oxyCODONE (ROXICODONE) 5 MG/5ML solution Take 5-10 mLs (5-10 mg) by mouth every 6 hours as needed for severe pain (Patient not taking: Reported on 8/6/2018) 473 mL 0     polyethylene glycol (MIRALAX/GLYCOLAX) Packet Take 17 g by mouth daily (Patient not taking: Reported on 9/24/2018) 30 packet 0     prochlorperazine (COMPAZINE) 10 MG tablet Take 1 tablet (10 mg) by mouth every 6 hours as needed (Nausea/Vomiting) (Patient not taking: Reported on 9/24/2018) 30 tablet 2     Recent Labs   Lab Test  09/24/18   0733  09/04/18   0908  08/28/18   0826   05/30/18   0638   04/17/18   1936   11/27/17   1151   11/07/17   0620   11/01/17   1547   A1C   --    --    --    --   7.8*   --   5.5   --   5.6   --    --    --    --    LDL   --    --    --    --    --    --    --    --    --    --    --    --   108*   HDL   --    --    --    --    --    --    --    --    --    --    --    --   59   TRIG   --    --    --    --    --    --    --    --    --    --   111   --   167*   ALT  22  28  24   < >  26   < >   --    < >  67*   < >  41   < >  37   CR  0.69  0.61  0.62   < >  0.63   < >  0.71   < >  0.70   --    0.58   < >  0.92   GFRESTIMATED  87  >90  >90   < >  >90   < >  85   < >  86   --   >90   < >  63   GFRESTBLACK  >90  >90  >90   < >  >90   < >  >90   < >  >90   --   >90   < >  76   POTASSIUM  4.2  4.3  4.1   < >  4.3   < >   --    < >  4.3   < >  3.2*   < >  3.6    < > = values in this interval not displayed.        Patient over age 50, mutual decision to screen reflected in health maintenance.    Pertinent mammograms are reviewed under the imaging tab.  History of abnormal Pap smear: NO - age 30- 65 PAP every 3 years recommended     Reviewed and updated as needed this visit by clinical staff  Tobacco  Meds  Med Hx  Surg Hx  Fam Hx  Soc Hx        Reviewed and updated as needed this visit by Provider        Past Medical History:   Diagnosis Date     Necrotizing pancreatitis      Pancreatic cancer (H)      PONV (postoperative nausea and vomiting)       Past Surgical History:   Procedure Laterality Date     ABDOMEN SURGERY  1983    Ruptured tubal pregnancies, additional surgeries followed     BACK SURGERY  2004    L5, S1 discectomy     BREAST SURGERY  2003    Breast reduction     COLONOSCOPY  2008     ENDOSCOPIC RETROGRADE CHOLANGIOPANCREATOGRAM N/A 1/25/2018    Procedure: COMBINED ENDOSCOPIC RETROGRADE CHOLANGIOPANCREATOGRAPHY, PLACE TUBE/STENT;  Endoscopic retrograde cholangiopancreatogram with stent placement and cyst drainage bile ;  Surgeon: Vito Sanches MD;  [facility]     ENDOSCOPIC ULTRASOUND LOWER GASTROINTESTIONAL TRACT (GI) N/A 1/25/2018    Procedure: ENDOSCOPIC ULTRASOUND LOWER GASTROINTESTIONAL TRACT (GI);  Endoscopic Ultrasound with guided Cyst-Gastrostomy;  Surgeon: González Metz MD;  [facility]     ENDOSCOPIC ULTRASOUND, ESOPHAGOSCOPY, GASTROSCOPY, DUODENOSCOPY (EGD), NECROSECTOMY N/A 12/4/2017    Procedure: ENDOSCOPIC ULTRASOUND, ESOPHAGOSCOPY, GASTROSCOPY, DUODENOSCOPY (EGD), NECROSECTOMY;  Esophagogastroduodenoscopy, with  Necrosectomy and stents replacement  ;   Surgeon: González Mtez MD;  [facility]     ENDOSCOPIC ULTRASOUND, ESOPHAGOSCOPY, GASTROSCOPY, DUODENOSCOPY (EGD), NECROSECTOMY N/A 2017    Procedure: ENDOSCOPIC ULTRASOUND, ESOPHAGOSCOPY, GASTROSCOPY, DUODENOSCOPY (EGD), NECROSECTOMY;  Esophagogastroduodenoscopy with Necrosectomy, Balloon Dilation, stent removal X3 and Cystgastrostomy stent Placement X2;  Surgeon: Guru Cecilia Staples MD;  [facility]     ESOPHAGOSCOPY, GASTROSCOPY, DUODENOSCOPY (EGD), COMBINED N/A 2017    Procedure: COMBINED ENDOSCOPIC ULTRASOUND, ESOPHAGOSCOPY, GASTROSCOPY, DUODENOSCOPY (EGD), FINE NEEDLE ASPIRATE/BIOPSY;  Endoscopic Ultrasound with cystgastrostomy and fine needle aspiration ;  Surgeon: González Metz MD;  [facility]     ESOPHAGOSCOPY, GASTROSCOPY, DUODENOSCOPY (EGD), COMBINED N/A 2018    Procedure: COMBINED ESOPHAGOSCOPY, GASTROSCOPY, DUODENOSCOPY (EGD);  EGD;  Surgeon: González Metz MD;  [facility]     ESOPHAGOSCOPY, GASTROSCOPY, DUODENOSCOPY (EGD), COMBINED N/A 2018    Procedure: COMBINED ESOPHAGOSCOPY, GASTROSCOPY, DUODENOSCOPY (EGD), REMOVE FOREIGN BODY;;  Surgeon: González Metz MD;  Location:  GI     GYN SURGERY  1983    Multiple ectopic pregnancies, reconnect,  1988     INSERT PORT VASCULAR ACCESS Right 2018    Procedure: INSERT PORT VASCULAR ACCESS;  Chest Port Placement - right;  Surgeon: Chanda Lawson PA-C;  [facility]     LAPAROSCOPY DIAGNOSTIC (GENERAL) N/A 2018    Procedure: LAPAROSCOPY DIAGNOSTIC (GENERAL);  Diagnostic Laparoscopy; Open Distal Pancreatectomy And Splenectomy, splenic flexure mobilization, cholecystectomy, intraoperative ultrasound;  Surgeon: Ja Byrd MD;  [facility]     PANCREATECTOMY, SPLENECTOMY N/A 2018    Procedure: PANCREATECTOMY, SPLENECTOMY;;  Surgeon: Ja Byrd MD;  [facility]     Obstetric History     No data available          ROS:  CONSTITUTIONAL:  "NEGATIVE for fever, chills, change in weight  INTEGUMENTARY/SKIN: NEGATIVE for worrisome rashes, moles or lesions  EYES: NEGATIVE for vision changes or irritation  ENT: NEGATIVE for ear, mouth and throat problems  RESP: NEGATIVE for significant cough or SOB  BREAST: NEGATIVE for masses, tenderness or discharge  CV: NEGATIVE for chest pain, palpitations or peripheral edema  GI: NEGATIVE for nausea, abdominal pain, heartburn, or change in bowel habits  : NEGATIVE for unusual urinary or vaginal symptoms. No vaginal bleeding.  MUSCULOSKELETAL: NEGATIVE for significant arthralgias or myalgia  NEURO: NEGATIVE for weakness, dizziness or paresthesias  PSYCHIATRIC: NEGATIVE for changes in mood or affect     OBJECTIVE:   BP 92/60  Pulse 67  Temp 97.6  F (36.4  C) (Tympanic)  Resp 16  Ht 5' 5\" (1.651 m)  Wt 148 lb 3.2 oz (67.2 kg)  SpO2 98%  BMI 24.66 kg/m2  EXAM:  GENERAL: healthy, alert and no distress  EYES: Eyes grossly normal to inspection, PERRL and conjunctivae and sclerae normal  HENT: ear canals and TM's normal, nose and mouth without ulcers or lesions  NECK: no adenopathy, no asymmetry, masses, or scars and thyroid normal to palpation  RESP: lungs clear to auscultation - no rales, rhonchi or wheezes  BREAST: normal without masses, tenderness or nipple discharge and no palpable axillary masses or adenopathy  CV: regular rate and rhythm, normal S1 S2, no S3 or S4, no murmur, click or rub, no peripheral edema and peripheral pulses strong  ABDOMEN: soft, nontender, no hepatosplenomegaly, no masses and bowel sounds normal   (female): normal female external genitalia, normal urethral meatus , vaginal mucosa pink, moist, well rugated and pap obtained  MS: no gross musculoskeletal defects noted, no edema  SKIN: no suspicious lesions or rashes  PSYCH: mentation appears normal, affect normal/bright    Diagnostic Test Results:  none     ASSESSMENT/PLAN:   (Z00.00) Encounter for routine adult health examination " "without abnormal findings  (primary encounter diagnosis)  Comment: Patient doing well overall   Plan: Encouraged healthy living     (Z13.220) Lipid screening  Comment: Patient will be notified of results  Plan: Lipid panel reflex to direct LDL Fasting            (E11.9,  Z79.4) Type 2 diabetes mellitus without complication, with long-term current use of insulin (H)  Comment: [facility] is [facility]   Plan: As above     (Z12.11) Special screening for malignant neoplasms, colon  Comment: Colonoscopy ordered   Plan: GASTROENTEROLOGY ADULT REF PROCEDURE ONLY            (Z12.4) Screening for cervical cancer  Comment: Patient will be notified of results  Plan: Pap imaged thin layer screen with HPV -         recommended age 30 - 65 years (select HPV order        below)              COUNSELING:   Reviewed preventive health counseling, as reflected in patient instructions       Regular exercise       Healthy diet/nutrition       Vision screening    BP Readings from Last 1 Encounters:   10/05/18 92/60     Estimated body mass index is 24.66 kg/(m^2) as calculated from the following:    Height as of this encounter: 5' 5\" (1.651 m).    Weight as of this encounter: 148 lb 3.2 oz (67.2 kg).           reports that she quit smoking about 37 years ago. Her smoking use included Cigarettes. She started smoking about 44 years ago. She has a 2.00 pack-year smoking history. She has never used smokeless tobacco.      Counseling Resources:  ATP IV Guidelines  Pooled Cohorts Equation Calculator  Breast Cancer Risk Calculator  FRAX Risk Assessment  ICSI Preventive Guidelines  Dietary Guidelines for Americans, 2010  USDA's MyPlate  ASA Prophylaxis  Lung CA Screening    Solange Mcgill MD  [facility]  "

## 2018-10-05 NOTE — PROGRESS NOTES
Please inform patient that test result was within normal parameters.   Thank you.     Solange Mcgill M.D.

## 2018-10-05 NOTE — LETTER
October 10, 2018      Kitty Bangura  [address] [facility] [id number]        Dear ,    We are writing to inform you of your test results.    Your cholesterol test result was within normal parameters.     Resulted Orders   Lipid panel reflex to direct LDL Fasting   Result Value Ref Range    Cholesterol 185 <200 mg/dL    Triglycerides 107 <150 mg/dL      Comment:      Fasting specimen    HDL Cholesterol 56 >49 mg/dL    LDL Cholesterol Calculated 108 (H) <100 mg/dL      Comment:      Above desirable:  100-129 mg/dl  Borderline High:  130-159 mg/dL  High:             160-189 mg/dL  Very high:       >189 mg/dl      Non HDL Cholesterol 129 <130 mg/dL       If you have any questions or concerns, please call the clinic at the number listed above.       Sincerely,        Solange Mcgill MD/nick

## 2018-10-05 NOTE — MR AVS SNAPSHOT
After Visit Summary   10/5/2018    Kitty Bangura    MRN: [id number]           Patient Information     Date Of Birth          1960        Visit Information        Provider Department      10/5/2018 9:00 AM Solange Mcgill MD [facility]        Today's Diagnoses     Lipid screening    -  1    Type 2 diabetes mellitus without complication, with long-term current use of insulin (H)          Care Instructions      Preventive Health Recommendations  Female Ages 50 - 64    Yearly exam: See your health care provider every year in order to  o Review health changes.   o Discuss preventive care.    o Review your medicines if your doctor has prescribed any.      Get a Pap test every three years (unless you have an abnormal result and your provider advises testing more often).    If you get Pap tests with HPV test, you only need to test every 5 years, unless you have an abnormal result.     You do not need a Pap test if your uterus was removed (hysterectomy) and you have not had cancer.    You should be tested each year for STDs (sexually transmitted diseases) if you're at risk.     Have a mammogram every 1 to 2 years.    Have a colonoscopy at age 50, or have a yearly FIT test (stool test). These exams screen for colon cancer.      Have a cholesterol test every 5 years, or more often if advised.    Have a diabetes test (fasting glucose) every three years. If you are at risk for diabetes, you should have this test more often.     If you are at risk for osteoporosis (brittle bone disease), think about having a bone density scan (DEXA).    Shots: Get a flu shot each year. Get a tetanus shot every 10 years.    Nutrition:     Eat at least 5 servings of fruits and vegetables each day.    Eat whole-grain bread, whole-wheat pasta and brown rice instead of white grains and rice.    Get adequate Calcium and Vitamin D.     Lifestyle    Exercise at least 150 minutes a week (30 minutes a day, 5  days a week). This will help you control your weight and prevent disease.    Limit alcohol to one drink per day.    No smoking.     Wear sunscreen to prevent skin cancer.     See your dentist every six months for an exam and cleaning.    See your eye doctor every 1 to 2 years.            Follow-ups after your visit        Your next 10 appointments already scheduled     Oct 09, 2018  9:30 AM CDT   (Arrive by 9:15 AM)   RETURN DIABETES with Latricia Bustillos PA-C   [facility] Endocrinology ([facility] [place])    [address]  3rd Floor  [facility] [address]   [phone]            Dec 21, 2018  9:00 AM CST   Level O with ROOM 4 [facility] Infusion ([facility])    [facility]  [address]  [facility] [address]   [phone]            Dec 21, 2018  9:30 AM CST   (Arrive by 9:15 AM)   CT CHEST ABDOMEN PELVIS W/O & W CONTRAST with WYCT1   [facility] CT ([facility])    [address]  [facility] [address]   [phone]           How do I prepare for my exam? (Food and drink instructions) To prepare: Do not eat or drink for 2 hours before your exam. If you need to take medicine, you may take it with small sips of water. (We may ask you to take liquid medicine as well.)  How do I prepare for my exam? (Other instructions) Please arrive 30 minutes early for your CT.  Once in the department you might be asked to drink water 15-20 minutes prior to your exam.  If indicated you may be asked to drink an oral contrast in advance of your CT.  If this is the case, the imaging team will let you know or be in contact with you prior to your appointment  Patients over 70 or patients with diabetes or kidney problems: If you haven t had a blood test (creatinine test) within the last 30 days, the Cardiologist/Radiologist may require you to get this test prior to your exam.  If you have diabetes:  Continue to take your  metformin medication on the day of your exam  What should I wear: Please wear loose clothing, such as a sweat suit or jogging clothes. Avoid snaps, zippers and other metal. We may ask you to undress and put on a hospital gown.  How long does the exam take: Most scans take less than 20 minutes.  What should I bring: Please bring any scans or X-rays taken at other hospitals, if similar tests were done. Also bring a list of your medicines, including vitamins, minerals and over-the-counter drugs. It is safest to leave personal items at home.  Do I need a : No  is needed.  What do I need to tell my doctor? Be sure to tell your doctor: * If you have any allergies. * If there s any chance you are pregnant. * If you are breastfeeding.  What should I do after the exam: No restrictions, You may resume normal activities.  What is this test: A CT (computed tomography) scan is a series of pictures that allows us to look inside your body. The scanner creates images of the body in cross sections, much like slices of bread. This helps us see any problems more clearly. You may receive contrast (X-ray dye) before or during your scan. You will be asked to drink the contrast.  Who should I call with questions: If you have any questions, please call the Imaging Department where you will have your exam. Directions, parking instructions, and other information is available on our website, [email or web address].            Dec 28, 2018 10:45 AM CST   (Arrive by 10:30 AM)   Return Visit with Jovany Monk MD   [facility] ([facility] [place])    [address] [place] [address]  [address]  [facility] [id number] [phone]            Jan 14, 2019  2:00 PM CST   (Arrive by 1:45 PM)   RETURN DIABETES with Es Gallo MD   [facility] Endocrinology ([facility])    [address] [place] [address]  3rd Floor  [facility] [id number] [phone]              Future tests that were  "ordered for you today     Open Future Orders        Priority Expected Expires Ordered    Lipid panel reflex to direct LDL Fasting Routine 10/5/2018 10/5/2019 10/5/2018            Who to contact     If you have questions or need follow up information about today's clinic visit or your schedule please contact [facility] directly at [phone].  Normal or non-critical lab and imaging results will be communicated to you by MyChart, letter or phone within 4 business days after the clinic has received the results. If you do not hear from us within 7 days, please contact the clinic through creadshart or phone. If you have a critical or abnormal lab result, we will notify you by phone as soon as possible.  Submit refill requests through [employer] or call your pharmacy and they will forward the refill request to us. Please allow 3 business days for your refill to be completed.          Additional Information About Your Visit        [employer] Information     [employer] gives you secure access to your electronic health record. If you see a primary care provider, you can also send messages to your care team and make appointments. If you have questions, please call your primary care clinic.  If you do not have a primary care provider, please call [phone] and they will assist you.        Care EveryWhere ID     This is your Care EveryWhere ID. This could be used by other organizations to access your [facility] medical records  [id number]        Your Vitals Were     Pulse Temperature Respirations Height Pulse Oximetry BMI (Body Mass Index)    67 97.6  F (36.4  C) (Tympanic) 16 5' 5\" (1.651 m) 98% 24.66 kg/m2       Blood Pressure from Last 3 Encounters:   10/05/18 92/60   09/24/18 111/51   09/11/18 92/59    Weight from Last 3 Encounters:   10/05/18 148 lb 3.2 oz (67.2 kg)   09/24/18 148 lb 12.8 oz (67.5 kg)   09/11/18 149 lb (67.6 kg)               Primary Care Provider Office Phone # Fax #    Solange Mcgill MD " "[phone]       [address] [address]        Equal Access to Services     CHASE DE GUZMAN : Hadii aad ku hadguzman Ruiz, wajosephineda lukameron, qaotiliata kaelderda marty, re juniorin hayaan marycheng huertas laMadelinge yipYudy So [facility] [phone].    ATENCIÓN: Si habla español, tiene a quiros disposición servicios gratuitos de asistencia lingüística. Llame al [phone].    We comply with applicable federal civil rights laws and [place] laws. We do not discriminate on the basis of race, color, national origin, age, disability, sex, sexual orientation, or gender identity.            Thank you!     Thank you for choosing [facility]  for your care. Our goal is always to provide you with excellent care. Hearing back from our patients is one way we can continue to improve our services. Please take a few minutes to complete the written survey that you may receive in the mail after your visit with us. Thank you!             Your Updated Medication List - Protect others around you: Learn how to safely use, store and throw away your medicines at www.disposemymeds.org.          This list is accurate as of 10/5/18  9:35 AM.  Always use your most recent med list.                   Brand Name Dispense Instructions for use Diagnosis    acetaminophen 500 MG tablet    TYLENOL    100 tablet    Take 2 tablets (1,000 mg) by mouth every 8 hours    Acute post-operative pain       amylase-lipase-protease 71858-63066 units Cpep per EC capsule    CREON    450 capsule    Take 2-3 with meals / 1-2 with snacks, up to 15 per day.    Necrotizing pancreatitis       BD insulin syringe ultrafine 31G X 5/16\" 0.3 ML   Generic drug:  insulin syringe-needle U-100           blood glucose monitoring test strip    ONETOUCH VERIO IQ    400 each    Use to test blood sugar 4 times daily or as directed.    Type 2 diabetes mellitus with diabetic polyneuropathy, with long-term current use of insulin (H)       capecitabine 500 MG " tablet CHEMO    XELODA    22 tablet    Take 2 tablets (1,000 mg) by mouth 2 times daily for 21 days Days 1 through 21, then 7 days off. Take within 30 mins after a meal.    Pancreatic adenocarcinoma (H)       docusate sodium 100 MG capsule    COLACE    60 capsule    Take 1 capsule (100 mg) by mouth daily    Other constipation       insulin  UNIT/ML injection    HumuLIN N/NovoLIN N    2 vial    Use as directed. Maximum 30 units daily.    Type 2 diabetes mellitus with diabetic polyneuropathy, with long-term current use of insulin (H)       insulin pen needle 32G X 4 MM    BD DOTTY U/F    100 each    Use 2 daily as directed.    Hyperglycemia, Malignant neoplasm of pancreas, unspecified location of malignancy (H)       insulin syringes (disposable) U-100 0.3 ML Misc     200 each    Use as directed. Two per day.    Type 2 diabetes mellitus with diabetic polyneuropathy, with long-term current use of insulin (H)       lidocaine (viscous) 2 % solution    XYLOCAINE    100 mL    Take 15 mLs by mouth every 2 hours as needed for moderate pain swish and spit every 3-8 hours as needed; max 8 doses/24 hour period    Mucositis due to chemotherapy, Pancreatic adenocarcinoma (H)       LORazepam 0.5 MG tablet    ATIVAN    30 tablet    Take 1 tablet (0.5 mg) by mouth every 4 hours as needed (Anxiety, Nausea/Vomiting or Sleep)    Pancreatic adenocarcinoma (H)       magic mouthwash suspension (diphenhydrAMINE, lidocaine, aluminum-magnesium & simethicone) Susp compounding kit     237 mL    Swish and swallow 5-10 mLs in mouth every 6 hours as needed for mouth sores    Mouth sores       nystatin [id number] UNIT/ML suspension    MYCOSTATIN    280 mL    Take 5 mLs (500,000 Units) by mouth 4 times daily Swish and swallow 4 times daily    Mouth sores       ondansetron 8 MG tablet    ZOFRAN    30 tablet    Take 1 tablet (8 mg) by mouth every 8 hours as needed for nausea    Pancreatic adenocarcinoma (H)       ONETOUCH DELICA LANCETS 33G Misc      100 each    4 lancets daily    Type 2 diabetes mellitus with diabetic polyneuropathy, with long-term current use of insulin (H)       oxyCODONE 5 MG/5ML solution    ROXICODONE    473 mL    Take 5-10 mLs (5-10 mg) by mouth every 6 hours as needed for severe pain    Mucositis due to chemotherapy, Pancreatic adenocarcinoma (H)       pantoprazole 40 MG EC tablet    PROTONIX    30 tablet    Take 1 tablet (40 mg) by mouth every morning    Pancreatic adenocarcinoma (H)       polyethylene glycol Packet    MIRALAX/GLYCOLAX    30 packet    Take 17 g by mouth daily    Drug-induced constipation       prochlorperazine 10 MG tablet    COMPAZINE    30 tablet    Take 1 tablet (10 mg) by mouth every 6 hours as needed (Nausea/Vomiting)    Pancreatic adenocarcinoma (H)

## 2018-10-09 ENCOUNTER — OFFICE VISIT (OUTPATIENT)
Dept: ENDOCRINOLOGY | Facility: CLINIC | Age: 58
End: 2018-10-09
Payer: COMMERCIAL

## 2018-10-09 VITALS
HEART RATE: 76 BPM | BODY MASS INDEX: 24.96 KG/M2 | WEIGHT: 150 LBS | SYSTOLIC BLOOD PRESSURE: 87 MMHG | DIASTOLIC BLOOD PRESSURE: 60 MMHG

## 2018-10-09 DIAGNOSIS — E11.65 TYPE 2 DIABETES MELLITUS WITH HYPERGLYCEMIA, WITHOUT LONG-TERM CURRENT USE OF INSULIN (H): Primary | ICD-10-CM

## 2018-10-09 LAB
COPATH REPORT: NORMAL
HBA1C MFR BLD: 6.1 % (ref 4.3–6)
PAP: NORMAL

## 2018-10-09 RX ORDER — METFORMIN HCL 500 MG
500 TABLET, EXTENDED RELEASE 24 HR ORAL
Qty: 60 TABLET | Refills: 3 | Status: SHIPPED | OUTPATIENT
Start: 2018-10-09 | End: 2018-10-31

## 2018-10-09 ASSESSMENT — PAIN SCALES - GENERAL: PAINLEVEL: NO PAIN (0)

## 2018-10-09 NOTE — MR AVS SNAPSHOT
After Visit Summary   10/9/2018    Kitty Bangura    MRN: [id number]           Patient Information     Date Of Birth          1960        Visit Information        Provider Department      10/9/2018 9:30 AM Latricia Bustillos PA-C [facility]        Today's Diagnoses     Type 2 diabetes mellitus with hyperglycemia, without long-term current use of insulin (H)    -  1      Care Instructions    Send Yeni Mota CDE blood sugar values next Monday.  Discontinue insulin today.  Start Metformin  mg 1 tab with breakfast daily x 2 weeks and then increase 1 tab with breakfast and 1 tab with dinner.  Call if you have blood sugars 250.  Latricia Bustillos PA-C          Follow-ups after your visit        Your next 10 appointments already scheduled     Dec 11, 2018   Procedure with Lobito Marte MD   [facility] Endoscopy ([facility])    [address] [address]   [phone]           The medical center is located at [address] [place] [address]. (between [address] in [place], [place] north of [place]).            Dec 21, 2018  9:00 AM CST   Level O with ROOM 4 [facility] ([facility])    n Medical C[facility]  [address]  [facility] [address]   [phone]            Dec 21, 2018  9:30 AM CST   (Arrive by 9:15 AM)   CT CHEST ABDOMEN PELVIS W/O & W CONTRAST with WYCT1   [facility] CT ([facility])    [address] [facility] [address]   [phone]           How do I prepare for my exam? (Food and drink instructions) To prepare: Do not eat or drink for 2 hours before your exam. If you need to take medicine, you may take it with small sips of water. (We may ask you to take liquid medicine as well.)  How do I prepare for my exam? (Other instructions) Please arrive 30 minutes early for your CT.  Once in the department you might be asked to drink water 15-20 minutes  prior to your exam.  If indicated you may be asked to drink an oral contrast in advance of your CT.  If this is the case, the imaging team will let you know or be in contact with you prior to your appointment  Patients over 70 or patients with diabetes or kidney problems: If you haven t had a blood test (creatinine test) within the last 30 days, the Cardiologist/Radiologist may require you to get this test prior to your exam.  If you have diabetes:  Continue to take your metformin medication on the day of your exam  What should I wear: Please wear loose clothing, such as a sweat suit or jogging clothes. Avoid snaps, zippers and other metal. We may ask you to undress and put on a hospital gown.  How long does the exam take: Most scans take less than 20 minutes.  What should I bring: Please bring any scans or X-rays taken at other hospitals, if similar tests were done. Also bring a list of your medicines, including vitamins, minerals and over-the-counter drugs. It is safest to leave personal items at home.  Do I need a : No  is needed.  What do I need to tell my doctor? Be sure to tell your doctor: * If you have any allergies. * If there s any chance you are pregnant. * If you are breastfeeding.  What should I do after the exam: No restrictions, You may resume normal activities.  What is this test: A CT (computed tomography) scan is a series of pictures that allows us to look inside your body. The scanner creates images of the body in cross sections, much like slices of bread. This helps us see any problems more clearly. You may receive contrast (X-ray dye) before or during your scan. You will be asked to drink the contrast.  Who should I call with questions: If you have any questions, please call the Imaging Department where you will have your exam. Directions, parking instructions, and other information is available on our website, [email or web address].            Dec 28, 2018 10:45 AM CST   (Arrive by  10:30 AM)   Return Visit with Jovany Monk MD   [facility] ([facility] [place])    [address]  [address]  [facility] [id number] [phone]            Jan 14, 2019  2:00 PM CST   (Arrive by 1:45 PM)   RETURN DIABETES with Es Gallo MD   [facility] Endocrinology ([facility] [place])    [address]  3rd Floor  [facility] [id number] [phone]              Who to contact     Please call your clinic at [phone] to:    Ask questions about your health    Make or cancel appointments    Discuss your medicines    Learn about your test results    Speak to your doctor            Additional Information About Your Visit        [employer] Information     [employer] gives you secure access to your electronic health record. If you see a primary care provider, you can also send messages to your care team and make appointments. If you have questions, please call your primary care clinic.  If you do not have a primary care provider, please call [phone] and they will assist you.      [employer] is an electronic gateway that provides easy, online access to your medical records. With [employer], you can request a clinic appointment, read your test results, renew a prescription or communicate with your care team.     To access your existing account, please contact your [facility] or call [phone] for assistance.        Care EveryWhere ID     This is your Care EveryWhere ID. This could be used by other organizations to access your [place] medical records  [id number]        Your Vitals Were     Pulse BMI (Body Mass Index)                76 24.96 kg/m2           Blood Pressure from Last 3 Encounters:   10/09/18 (!) 87/60   10/05/18 92/60   09/24/18 111/51    Weight from Last 3 Encounters:   10/09/18 68 kg (150 lb)   10/05/18 67.2 kg (148 lb 3.2 oz)   09/24/18 67.5 kg (148 lb 12.8 oz)              We Performed the  Following     Hemoglobin A1c POCT          Today's Medication Changes          These changes are accurate as of 10/9/18 11:59 PM.  If you have any questions, ask your nurse or doctor.               Start taking these medicines.        Dose/Directions    metFORMIN 500 MG 24 hr tablet   Commonly known as:  GLUCOPHAGE-XR   Started by:  Latricia Bustillos PA-C        Dose:  500 mg   Take 1 tablet (500 mg) by mouth daily (with dinner)   Quantity:  60 tablet   Refills:  3         Stop taking these medicines if you haven't already. Please contact your care team if you have questions.     insulin  UNIT/ML injection   Commonly known as:  HumuLIN N/NovoLIN N   Stopped by:  Latricia Bustillos PA-C           insulin pen needle 32G X 4 MM   Commonly known as:  BD DOTTY U/F   Stopped by:  Latricia Bustillos PA-C           insulin syringes (disposable) U-100 0.3 ML Misc   Stopped by:  Latricia Bustillos PA-C                Where to get your medicines      These medications were sent to [facility] [address] [place] [address] [address]  [address] [address]     Phone:  [phone]     metFORMIN 500 MG 24 hr tablet                Primary Care Provider Office Phone # Fax #    Josenargis Julito Mcgill [phone] [address] [facility] [address]        Equal Access to Services     CHASE [facility] MadisonJANICE : Hadii zaheer Ruiz, waaxda lukameron, qaybta kaalmafredy ferguson, re gerardo . So [facility] [phone].    ATENCIÓN: Si habla español, tiene a quiros disposición servicios gratuitos de asistencia lingüística. Macy al [phone].    We comply with applicable federal civil rights laws and [place] laws. We do not discriminate on the basis of race, color, national origin, age, disability, sex, sexual orientation, or gender identity.            Thank you!     Thank you for choosing [facility] ENDOCRINOLOGY  for your care. Our goal is  "always to provide you with excellent care. Hearing back from our patients is one way we can continue to improve our services. Please take a few minutes to complete the written survey that you may receive in the mail after your visit with us. Thank you!             Your Updated Medication List - Protect others around you: Learn how to safely use, store and throw away your medicines at www.disposemymeds.org.          This list is accurate as of 10/9/18 11:59 PM.  Always use your most recent med list.                   Brand Name Dispense Instructions for use Diagnosis    acetaminophen 500 MG tablet    TYLENOL    100 tablet    Take 2 tablets (1,000 mg) by mouth every 8 hours    Acute post-operative pain       amylase-lipase-protease 05174-15769 units Cpep per EC capsule    CREON    450 capsule    Take 2-3 with meals / 1-2 with snacks, up to 15 per day.    Necrotizing pancreatitis       BD insulin syringe ultrafine 31G X 5/16\" 0.3 ML   Generic drug:  insulin syringe-needle U-100           blood glucose monitoring test strip    ONETOUCH VERIO IQ    400 each    Use to test blood sugar 4 times daily or as directed.    Type 2 diabetes mellitus with diabetic polyneuropathy, with long-term current use of insulin (H)       capecitabine 500 MG tablet CHEMO    XELODA    22 tablet    Take 2 tablets (1,000 mg) by mouth 2 times daily for 21 days Days 1 through 21, then 7 days off. Take within 30 mins after a meal.    Pancreatic adenocarcinoma (H)       metFORMIN 500 MG 24 hr tablet    GLUCOPHAGE-XR    60 tablet    Take 1 tablet (500 mg) by mouth daily (with dinner)        ONETOUCH DELICA LANCETS 33G Misc     100 each    4 lancets daily    Type 2 diabetes mellitus with diabetic polyneuropathy, with long-term current use of insulin (H)       pantoprazole 40 MG EC tablet    PROTONIX    30 tablet    Take 1 tablet (40 mg) by mouth every morning    Pancreatic adenocarcinoma (H)       polyethylene glycol Packet    MIRALAX/GLYCOLAX    30 " packet    Take 17 g by mouth daily    Drug-induced constipation

## 2018-10-09 NOTE — NURSING NOTE
Chief Complaint   Patient presents with     RECHECK     DM2     Capillary puncture performed for Hemoglobin A1C test. Patient tolerated well.

## 2018-10-09 NOTE — LETTER
10/9/2018       RE: Kitty Bangura  [address] [facility] [address]     Dear Colleague,    Thank you for referring your patient, Kitty Bangura, to the [facility] ENDOCRINOLOGY at [facility]. Please see a copy of my visit note below.    HPI  Kitty Bangura is a 57 year old female with type 2 diabetes mellitus here today for a follow up visit.  She was last seen in our clinic by Dr. Es Gallo in July 2018.  Pt's hx is significant for hx of necrotizing pancreatitis and pancreatic adenocarcinoma s/p resection and chemotherapy.  Pt's recent CTs showed no evidence of recurrent carcinoma.   She looks good and is feeling well.  She is no longer taking prednisone.  For her diabetes, she is on low dose NPH 4 units subcutaneous each am and 6 units subcutaneous each pm.    Pt's A1C is 6.1 % today. Pt's A1C was 7.8 % in May 2018.  We downloaded pt's glucose meter and her blood sugar values are good.  Her average glucose was 1331 with SD 42 over the past 14 days.  Her FBS values have been 111, 122, 119, 139, 132, 123, 121, 95, 125, 110 and 133.  Her prelunch blood sugar values have been 72, 80, 70, 125, 70, 73, 78, 78, 102, 98 and 100.  Patient's predinner blood sugar values have been 149, 144, 87, 98, 93, 163, 110 and 121.  On ROS today, pt reports feeling well.  She has a mild dry cough.  No SOB, fever or chills.  She has intermittent left side abd pain.  No diarrhea.  Pt denies frequent headaches, blurred vision, nausea, vomiting, chest pain, dysuria, hematuria or foot ulcers.  No numbness tingling or pain in her feet or hands.    Diabetes Care  Retinopathy:none; pt will schedule an Oph exam.  Nephropathy:none.  Neuropathy:none.  Foot Exam:no ulcers.  Taking aspirin: no.  Lipids:  in Oct 2018.    ROS  Please see under history of present illness.    Allergies  No Known Allergies    Medications  Current Outpatient Prescriptions   Medication Sig Dispense Refill      "acetaminophen (TYLENOL) 500 MG tablet Take 2 tablets (1,000 mg) by mouth every 8 hours 100 tablet 1     amylase-lipase-protease (CREON) 69873-90195 units CPEP per EC capsule Take 2-3 with meals / 1-2 with snacks, up to 15 per day. 450 capsule 6     BD INSULIN SYRINGE ULTRAFINE 31G X 5/16\" 0.3 ML        blood glucose monitoring (ONETOUCH VERIO IQ) test strip Use to test blood sugar 4 times daily or as directed. 400 each 3     metFORMIN (GLUCOPHAGE-XR) 500 MG 24 hr tablet Take 1 tablet (500 mg) by mouth daily (with dinner) 60 tablet 3     ONETOUCH DELICA LANCETS 33G MISC 4 lancets daily 100 each 3     pantoprazole (PROTONIX) 40 MG EC tablet Take 1 tablet (40 mg) by mouth every morning 30 tablet 0     polyethylene glycol (MIRALAX/GLYCOLAX) Packet Take 17 g by mouth daily 30 packet 0     capecitabine (XELODA) 500 MG tablet CHEMO Take 2 tablets (1,000 mg) by mouth 2 times daily for 21 days Days 1 through 21, then 7 days off. Take within 30 mins after a meal. 22 tablet 0       Family History  family history includes Anxiety Disorder in her brother, sister, and son; Breast Cancer in her cousin, cousin, and cousin; Colon Cancer in an other family member; Depression in her sister and sister; Diabetes in her maternal grandfather, mother, and sister; HEART DISEASE in her brother and father; Hyperlipidemia in her brother and father; Hypertension in her brother, maternal grandfather, mother, and sister; Mental Illness in her sister; Obesity in her maternal grandfather, maternal grandmother, mother, sister, and sister; Other Cancer in an other family member.    Social History   reports that she quit smoking about 37 years ago. Her smoking use included Cigarettes. She started smoking about 44 years ago. She has a 2.00 pack-year smoking history. She has never used smokeless tobacco. She reports that she does not drink alcohol or use illicit drugs.     Past Medical History  Past Medical History:   Diagnosis Date     Necrotizing " pancreatitis      Pancreatic cancer (H)      PONV (postoperative nausea and vomiting)        Past Surgical History:   Procedure Laterality Date     ABDOMEN SURGERY  1983    Ruptured tubal pregnancies, additional surgeries followed     BACK SURGERY  2004    L5, S1 discectomy     BREAST SURGERY  2003    Breast reduction     COLONOSCOPY  2008     ENDOSCOPIC RETROGRADE CHOLANGIOPANCREATOGRAM N/A 1/25/2018    Procedure: COMBINED ENDOSCOPIC RETROGRADE CHOLANGIOPANCREATOGRAPHY, PLACE TUBE/STENT;  Endoscopic retrograde cholangiopancreatogram with stent placement and cyst drainage bile ;  Surgeon: Vito Sanches MD;  Location: [facility]     ENDOSCOPIC ULTRASOUND LOWER GASTROINTESTIONAL TRACT (GI) N/A 1/25/2018    Procedure: ENDOSCOPIC ULTRASOUND LOWER GASTROINTESTIONAL TRACT (GI);  Endoscopic Ultrasound with guided Cyst-Gastrostomy;  Surgeon: González Metz MD;  Location: [facility]     ENDOSCOPIC ULTRASOUND, ESOPHAGOSCOPY, GASTROSCOPY, DUODENOSCOPY (EGD), NECROSECTOMY N/A 12/4/2017    Procedure: ENDOSCOPIC ULTRASOUND, ESOPHAGOSCOPY, GASTROSCOPY, DUODENOSCOPY (EGD), NECROSECTOMY;  Esophagogastroduodenoscopy, with  Necrosectomy and stents replacement  ;  Surgeon: González Metz MD;  Location: [facility]     ENDOSCOPIC ULTRASOUND, ESOPHAGOSCOPY, GASTROSCOPY, DUODENOSCOPY (EGD), NECROSECTOMY N/A 12/12/2017    Procedure: ENDOSCOPIC ULTRASOUND, ESOPHAGOSCOPY, GASTROSCOPY, DUODENOSCOPY (EGD), NECROSECTOMY;  Esophagogastroduodenoscopy with Necrosectomy, Balloon Dilation, stent removal X3 and Cystgastrostomy stent Placement X2;  Surgeon: Guru Cecilia Staples MD;  [facility]: [facility]     ESOPHAGOSCOPY, GASTROSCOPY, DUODENOSCOPY (EGD), COMBINED N/A 11/29/2017    Procedure: COMBINED ENDOSCOPIC ULTRASOUND, ESOPHAGOSCOPY, GASTROSCOPY, DUODENOSCOPY (EGD), FINE NEEDLE ASPIRATE/BIOPSY;  Endoscopic Ultrasound with cystgastrostomy and fine needle aspiration ;  Surgeon: González Metz MD;  Location: [facility]      ESOPHAGOSCOPY, GASTROSCOPY, DUODENOSCOPY (EGD), COMBINED N/A 2018    Procedure: COMBINED ESOPHAGOSCOPY, GASTROSCOPY, DUODENOSCOPY (EGD);  EGD;  Surgeon: González Metz MD;  Location:  GI     ESOPHAGOSCOPY, GASTROSCOPY, DUODENOSCOPY (EGD), COMBINED N/A 2018    Procedure: COMBINED ESOPHAGOSCOPY, GASTROSCOPY, DUODENOSCOPY (EGD), REMOVE FOREIGN BODY;;  Surgeon: González Metz MD;  Location:  GI     GYN SURGERY  1983    Multiple ectopic pregnancies, reconnect,       INSERT PORT VASCULAR ACCESS Right 2018    Procedure: INSERT PORT VASCULAR ACCESS;  Chest Port Placement - right;  Surgeon: Chanda Lawson PA-C;  [facility]: [facility]     LAPAROSCOPY DIAGNOSTIC (GENERAL) N/A 2018    Procedure: LAPAROSCOPY DIAGNOSTIC (GENERAL);  Diagnostic Laparoscopy; Open Distal Pancreatectomy And Splenectomy, splenic flexure mobilization, cholecystectomy, intraoperative ultrasound;  Surgeon: Ja Byrd MD;  Location: [facility]     PANCREATECTOMY, SPLENECTOMY N/A 2018    Procedure: PANCREATECTOMY, SPLENECTOMY;;  Surgeon: Ja Byrd MD;  Location: [facility]       Physical Exam  BP (!) 87/60  Pulse 76  Wt 68 kg (150 lb)  BMI 24.96 kg/m2  Body mass index is 24.96 kg/(m^2).    GENERAL : In no apparent distress    RESULTS  Creatinine   Date Value Ref Range Status   2018 0.69 0.52 - 1.04 mg/dL Final     GFR Estimate   Date Value Ref Range Status   2018 87 >60 mL/min/1.7m2 Final     Comment:     Non  GFR Calc     Hemoglobin A1C   Date Value Ref Range Status   2018 7.8 (H) 0 - 5.6 % Final     Comment:     Normal <5.7% Prediabetes 5.7-6.4%  Diabetes 6.5% or higher - adopted from ADA   consensus guidelines.       Potassium   Date Value Ref Range Status   2018 4.2 3.4 - 5.3 mmol/L Final     ALT   Date Value Ref Range Status   2018 22 0 - 50 U/L Final     AST   Date Value Ref Range Status   2018 25 0 - 45 U/L Final       Cholesterol   Date Value Ref  Range Status   10/05/2018 185 <200 mg/dL Final   11/01/2017 200 (H) <200 mg/dL Final     Comment:     Desirable:       <200 mg/dl     HDL Cholesterol   Date Value Ref Range Status   10/05/2018 56 >49 mg/dL Final   11/01/2017 59 >49 mg/dL Final     LDL Cholesterol Calculated   Date Value Ref Range Status   10/05/2018 108 (H) <100 mg/dL Final     Comment:     Above desirable:  100-129 mg/dl  Borderline High:  130-159 mg/dL  High:             160-189 mg/dL  Very high:       >189 mg/dl     11/01/2017 108 (H) <100 mg/dL Final     Comment:     Above desirable:  100-129 mg/dl  Borderline High:  130-159 mg/dL  High:             160-189 mg/dL  Very high:       >189 mg/dl       Triglycerides   Date Value Ref Range Status   10/05/2018 107 <150 mg/dL Final     Comment:     Fasting specimen   11/07/2017 111 <150 mg/dL Final     A1C   6.1 % today.    ASSESSMENT/PLAN:    1.  TYPE 2 DIABETES MELLITUS:  Pt's A1C is good at 6.1 % today.  Will discontinue low dose of NPH today and start Metformin  mg 1 tab with breakfast daily x 2 weeks and then increase 1 tab with breakfast and dinner daily.  Her creat was stable at 0.69 with GFR 87 mL/min on 9/24/2018.  Pt is to send blood sugar data for review next week or call if her blood sugar values are > 250.  She will schedule her annual Oph exam.  Feet are ok today.    2. HX OF PANCREATIC CARCINOMA: S/P resection and chemo. Recent CTs show no evidence of recurrent carcinoma.  Pt followed by Oncology staff here.    3.  Return to Endocrine Clinic to see Dr. Es Gallo in Jan 2019.  Pt is to send blood sugar data next week for review.  Latricai Bustillos PA-C

## 2018-10-09 NOTE — PATIENT INSTRUCTIONS
Send Yeni Mota CDE blood sugar values next Monday.  Discontinue insulin today.  Start Metformin  mg 1 tab with breakfast daily x 2 weeks and then increase 1 tab with breakfast and 1 tab with dinner.  Call if you have blood sugars 250.  Latricia Bustillos PA-C

## 2018-10-11 ENCOUNTER — TELEPHONE (OUTPATIENT)
Dept: FAMILY MEDICINE | Facility: CLINIC | Age: 58
End: 2018-10-11

## 2018-10-11 NOTE — PROGRESS NOTES
"HPI  Kitty Bangura is a 57 year old female with type 2 diabetes mellitus here today for a follow up visit.  She was last seen in our clinic by Dr. Es Gallo in July 2018.  Pt's hx is significant for hx of necrotizing pancreatitis and pancreatic adenocarcinoma s/p resection and chemotherapy.  Pt's recent CTs showed no evidence of recurrent carcinoma.   She looks good and is feeling well.  She is no longer taking prednisone.  For her diabetes, she is on low dose NPH 4 units subcutaneous each am and 6 units subcutaneous each pm.    Pt's A1C is 6.1 % today. Pt's A1C was 7.8 % in May 2018.  We downloaded pt's glucose meter and her blood sugar values are good.  Her average glucose was 1331 with SD 42 over the past 14 days.  Her FBS values have been 111, 122, 119, 139, 132, 123, 121, 95, 125, 110 and 133.  Her prelunch blood sugar values have been 72, 80, 70, 125, 70, 73, 78, 78, 102, 98 and 100.  Patient's predinner blood sugar values have been 149, 144, 87, 98, 93, 163, 110 and 121.  On ROS today, pt reports feeling well.  She has a mild dry cough.  No SOB, fever or chills.  She has intermittent left side abd pain.  No diarrhea.  Pt denies frequent headaches, blurred vision, nausea, vomiting, chest pain, dysuria, hematuria or foot ulcers.  No numbness tingling or pain in her feet or hands.    Diabetes Care  Retinopathy:none; pt will schedule an Oph exam.  Nephropathy:none.  Neuropathy:none.  Foot Exam:no ulcers.  Taking aspirin: no.  Lipids:  in Oct 2018.    ROS  Please see under history of present illness.    Allergies  No Known Allergies    Medications  Current Outpatient Prescriptions   Medication Sig Dispense Refill     acetaminophen (TYLENOL) 500 MG tablet Take 2 tablets (1,000 mg) by mouth every 8 hours 100 tablet 1     amylase-lipase-protease (CREON) 13569-11670 units CPEP per EC capsule Take 2-3 with meals / 1-2 with snacks, up to 15 per day. 450 capsule 6     BD INSULIN SYRINGE ULTRAFINE 31G X 5/16\" " 0.3 ML        blood glucose monitoring (ONETOUCH VERIO IQ) test strip Use to test blood sugar 4 times daily or as directed. 400 each 3     metFORMIN (GLUCOPHAGE-XR) 500 MG 24 hr tablet Take 1 tablet (500 mg) by mouth daily (with dinner) 60 tablet 3     ONETOUCH DELICA LANCETS 33G MISC 4 lancets daily 100 each 3     pantoprazole (PROTONIX) 40 MG EC tablet Take 1 tablet (40 mg) by mouth every morning 30 tablet 0     polyethylene glycol (MIRALAX/GLYCOLAX) Packet Take 17 g by mouth daily 30 packet 0     capecitabine (XELODA) 500 MG tablet CHEMO Take 2 tablets (1,000 mg) by mouth 2 times daily for 21 days Days 1 through 21, then 7 days off. Take within 30 mins after a meal. 22 tablet 0       Family History  family history includes Anxiety Disorder in her brother, sister, and son; Breast Cancer in her cousin, cousin, and cousin; Colon Cancer in an other family member; Depression in her sister and sister; Diabetes in her maternal grandfather, mother, and sister; HEART DISEASE in her brother and father; Hyperlipidemia in her brother and father; Hypertension in her brother, maternal grandfather, mother, and sister; Mental Illness in her sister; Obesity in her maternal grandfather, maternal grandmother, mother, sister, and sister; Other Cancer in an other family member.    Social History   reports that she quit smoking about 37 years ago. Her smoking use included Cigarettes. She started smoking about 44 years ago. She has a 2.00 pack-year smoking history. She has never used smokeless tobacco. She reports that she does not drink alcohol or use illicit drugs.     Past Medical History  Past Medical History:   Diagnosis Date     Necrotizing pancreatitis      Pancreatic cancer (H)      PONV (postoperative nausea and vomiting)        Past Surgical History:   Procedure Laterality Date     ABDOMEN SURGERY  1983    Ruptured tubal pregnancies, additional surgeries followed     BACK SURGERY  2004    L5, S1 discectomy     BREAST SURGERY   2003    Breast reduction     COLONOSCOPY  2008     ENDOSCOPIC RETROGRADE CHOLANGIOPANCREATOGRAM N/A 1/25/2018    Procedure: COMBINED ENDOSCOPIC RETROGRADE CHOLANGIOPANCREATOGRAPHY, PLACE TUBE/STENT;  Endoscopic retrograde cholangiopancreatogram with stent placement and cyst drainage bile ;  Surgeon: Vito Sanches MD;  Location: [facility]     ENDOSCOPIC ULTRASOUND LOWER GASTROINTESTIONAL TRACT (GI) N/A 1/25/2018    Procedure: ENDOSCOPIC ULTRASOUND LOWER GASTROINTESTIONAL TRACT (GI);  Endoscopic Ultrasound with guided Cyst-Gastrostomy;  Surgeon: González Metz MD;  Location: [facility]     ENDOSCOPIC ULTRASOUND, ESOPHAGOSCOPY, GASTROSCOPY, DUODENOSCOPY (EGD), NECROSECTOMY N/A 12/4/2017    Procedure: ENDOSCOPIC ULTRASOUND, ESOPHAGOSCOPY, GASTROSCOPY, DUODENOSCOPY (EGD), NECROSECTOMY;  Esophagogastroduodenoscopy, with  Necrosectomy and stents replacement  ;  Surgeon: González Metz MD;  [facility]     ENDOSCOPIC ULTRASOUND, ESOPHAGOSCOPY, GASTROSCOPY, DUODENOSCOPY (EGD), NECROSECTOMY N/A 12/12/2017    Procedure: ENDOSCOPIC ULTRASOUND, ESOPHAGOSCOPY, GASTROSCOPY, DUODENOSCOPY (EGD), NECROSECTOMY;  Esophagogastroduodenoscopy with Necrosectomy, Balloon Dilation, stent removal X3 and Cystgastrostomy stent Placement X2;  Surgeon: Guru Cecilia Staples MD;  [facility]: [facility]     ESOPHAGOSCOPY, GASTROSCOPY, DUODENOSCOPY (EGD), COMBINED N/A 11/29/2017    Procedure: COMBINED ENDOSCOPIC ULTRASOUND, ESOPHAGOSCOPY, GASTROSCOPY, DUODENOSCOPY (EGD), FINE NEEDLE ASPIRATE/BIOPSY;  Endoscopic Ultrasound with cystgastrostomy and fine needle aspiration ;  Surgeon: González eMtz MD;  [facility]     ESOPHAGOSCOPY, GASTROSCOPY, DUODENOSCOPY (EGD), COMBINED N/A 5/8/2018    Procedure: COMBINED ESOPHAGOSCOPY, GASTROSCOPY, DUODENOSCOPY (EGD);  EGD;  Surgeon: González Metz MD;  [facility]     ESOPHAGOSCOPY, GASTROSCOPY, DUODENOSCOPY (EGD), COMBINED N/A 5/8/2018    Procedure: COMBINED  ESOPHAGOSCOPY, GASTROSCOPY, DUODENOSCOPY (EGD), REMOVE FOREIGN BODY;;  Surgeon: González Metz MD;  [facility]     GYN SURGERY  1983    Multiple ectopic pregnancies, reconnect,       INSERT PORT VASCULAR ACCESS Right 2018    Procedure: INSERT PORT VASCULAR ACCESS;  Chest Port Placement - right;  Surgeon: Chanda Lawson PA-C;  [facility]     LAPAROSCOPY DIAGNOSTIC (GENERAL) N/A 2018    Procedure: LAPAROSCOPY DIAGNOSTIC (GENERAL);  Diagnostic Laparoscopy; Open Distal Pancreatectomy And Splenectomy, splenic flexure mobilization, cholecystectomy, intraoperative ultrasound;  Surgeon: Ja Byrd MD;  [facility]     PANCREATECTOMY, SPLENECTOMY N/A 2018    Procedure: PANCREATECTOMY, SPLENECTOMY;;  Surgeon: Ja Byrd MD;  [facility]       Physical Exam  BP (!) 87/60  Pulse 76  Wt 68 kg (150 lb)  BMI 24.96 kg/m2  Body mass index is 24.96 kg/(m^2).    GENERAL : In no apparent distress    RESULTS  Creatinine   Date Value Ref Range Status   2018 0.69 0.52 - 1.04 mg/dL Final     GFR Estimate   Date Value Ref Range Status   2018 87 >60 mL/min/1.7m2 Final     Comment:     Non  GFR Calc     Hemoglobin A1C   Date Value Ref Range Status   2018 7.8 (H) 0 - 5.6 % Final     Comment:     Normal <5.7% Prediabetes 5.7-6.4%  Diabetes 6.5% or higher - adopted from ADA   consensus guidelines.       Potassium   Date Value Ref Range Status   2018 4.2 3.4 - 5.3 mmol/L Final     ALT   Date Value Ref Range Status   2018 22 0 - 50 U/L Final     AST   Date Value Ref Range Status   2018 25 0 - 45 U/L Final       Cholesterol   Date Value Ref Range Status   10/05/2018 185 <200 mg/dL Final   2017 200 (H) <200 mg/dL Final     Comment:     Desirable:       <200 mg/dl     HDL Cholesterol   Date Value Ref Range Status   10/05/2018 56 >49 mg/dL Final   2017 59 >49 mg/dL Final     LDL Cholesterol Calculated   Date Value Ref  Range Status   10/05/2018 108 (H) <100 mg/dL Final     Comment:     Above desirable:  100-129 mg/dl  Borderline High:  130-159 mg/dL  High:             160-189 mg/dL  Very high:       >189 mg/dl     11/01/2017 108 (H) <100 mg/dL Final     Comment:     Above desirable:  100-129 mg/dl  Borderline High:  130-159 mg/dL  High:             160-189 mg/dL  Very high:       >189 mg/dl       Triglycerides   Date Value Ref Range Status   10/05/2018 107 <150 mg/dL Final     Comment:     Fasting specimen   11/07/2017 111 <150 mg/dL Final     A1C   6.1 % today.    ASSESSMENT/PLAN:    1.  TYPE 2 DIABETES MELLITUS:  Pt's A1C is good at 6.1 % today.  Will discontinue low dose of NPH today and start Metformin  mg 1 tab with breakfast daily x 2 weeks and then increase 1 tab with breakfast and dinner daily.  Her creat was stable at 0.69 with GFR 87 mL/min on 9/24/2018.  Pt is to send blood sugar data for review next week or call if her blood sugar values are > 250.  She will schedule her annual Oph exam.  Feet are ok today.    2. HX OF PANCREATIC CARCINOMA: S/P resection and chemo. Recent CTs show no evidence of recurrent carcinoma.  Pt followed by Oncology staff here.    3.  Return to Endocrine Clinic to see Dr. Es Gallo in Jan 2019.  Pt is to send blood sugar data next week for review.

## 2018-10-11 NOTE — TELEPHONE ENCOUNTER
Panel Management Review      Patient has the following on her problem list:       Composite cancer screening  Chart review shows that this patient is due/due soon for the following Colonoscopy  Summary:    Patient is due/failing the following:   COLONOSCOPY    Action needed:   Patient needs referral/order: colonoscopy/fit     Type of outreach:    Sent [employer] message.    Questions for provider review:    None                                                                                                                                    Sherry Mahoney CMA     Chart routed to  .

## 2018-10-12 LAB
FINAL DIAGNOSIS: NORMAL
HPV HR 12 DNA CVX QL NAA+PROBE: NEGATIVE
HPV16 DNA SPEC QL NAA+PROBE: NEGATIVE
HPV18 DNA SPEC QL NAA+PROBE: NEGATIVE
SPECIMEN DESCRIPTION: NORMAL
SPECIMEN SOURCE CVX/VAG CYTO: NORMAL

## 2018-10-14 NOTE — PROGRESS NOTES
ORAL CHEMOTHERAPY DISCONTINUATION       Primary Oncologist:  Dr. Monk  Primary Oncology Clinic: [facility]  Cancer Diagnosis:  Pancreatic Cancer  Therapy History:  Xeloda 1000 mg BID  Start Date: 5/30/18  Therapy Ended On:  9/24/2018  Reason For Discontinuation: therapy completed    Additional Notes:  Thank you for the opportunity to be a part in this patient's oral chemotherapy. The oncology pharmacy will no longer be following this patient for oral chemotherapy. If there are an questions or the plan changes, feel free to contact us.  Kristen Weiler, Pharmacy Intern  Oral Chemotherapy Monitoring Program   [facility]  [phone]

## 2018-10-30 ENCOUNTER — TELEPHONE (OUTPATIENT)
Dept: EDUCATION SERVICES | Facility: CLINIC | Age: 58
End: 2018-10-30

## 2018-10-30 ENCOUNTER — INFUSION THERAPY VISIT (OUTPATIENT)
Dept: INFUSION THERAPY | Facility: CLINIC | Age: 58
End: 2018-10-30
Attending: INTERNAL MEDICINE
Payer: COMMERCIAL

## 2018-10-30 DIAGNOSIS — Z95.828 PORT-A-CATH IN PLACE: Primary | ICD-10-CM

## 2018-10-30 DIAGNOSIS — E11.65 TYPE 2 DIABETES MELLITUS WITH HYPERGLYCEMIA, WITH LONG-TERM CURRENT USE OF INSULIN (H): Primary | ICD-10-CM

## 2018-10-30 DIAGNOSIS — Z79.4 TYPE 2 DIABETES MELLITUS WITH HYPERGLYCEMIA, WITH LONG-TERM CURRENT USE OF INSULIN (H): Primary | ICD-10-CM

## 2018-10-30 PROCEDURE — 25000128 H RX IP 250 OP 636: Performed by: INTERNAL MEDICINE

## 2018-10-30 PROCEDURE — 96523 IRRIG DRUG DELIVERY DEVICE: CPT

## 2018-10-30 RX ORDER — HEPARIN SODIUM (PORCINE) LOCK FLUSH IV SOLN 100 UNIT/ML 100 UNIT/ML
5 SOLUTION INTRAVENOUS
Status: DISCONTINUED | OUTPATIENT
Start: 2018-10-30 | End: 2018-10-30 | Stop reason: HOSPADM

## 2018-10-30 RX ADMIN — SODIUM CHLORIDE, PRESERVATIVE FREE 5 ML: 5 INJECTION INTRAVENOUS at 13:34

## 2018-10-30 NOTE — MR AVS SNAPSHOT
After Visit Summary   10/30/2018    Kitty Bangura    MRN: [id number]           Patient Information     Date Of Birth          1960        Visit Information        Provider Department      10/30/2018 1:30 PM ROOM 7 [facility]fusion        Today's Diagnoses     Port-A-Cath in place    -  1       Follow-ups after your visit        Your next 10 appointments already scheduled     Dec 11, 2018   Procedure with Lobito Marte MD   [facility] Endoscopy ([facility])    [address] [address]   [phone]           The medical center is located at [address] (between [address] in [place], [place] miles north of [place]).            Dec 21, 2018  9:00 AM CST   Level O with ROOM 4 [facility]fusion ([facility])    [facility]  [address]  [facility] [address]   [phone]            Dec 21, 2018  9:30 AM CST   (Arrive by 9:15 AM)   CT CHEST ABDOMEN PELVIS W/O & W CONTRAST with W[place]CT1   [facility] CT ([facility])    [address] [address]   [phone]           How do I prepare for my exam? (Food and drink instructions) To prepare: Do not eat or drink for 2 hours before your exam. If you need to take medicine, you may take it with small sips of water. (We may ask you to take liquid medicine as well.)  How do I prepare for my exam? (Other instructions) Please arrive 30 minutes early for your CT.  Once in the department you might be asked to drink water 15-20 minutes prior to your exam.  If indicated you may be asked to drink an oral contrast in advance of your CT.  If this is the case, the imaging team will let you know or be in contact with you prior to your appointment  Patients over 70 or patients with diabetes or kidney problems: If you haven t had a blood test (creatinine test) within the last 30 days, the Cardiologist/Radiologist  may require you to get this test prior to your exam.  If you have diabetes:  Continue to take your metformin medication on the day of your exam  What should I wear: Please wear loose clothing, such as a sweat suit or jogging clothes. Avoid snaps, zippers and other metal. We may ask you to undress and put on a hospital gown.  How long does the exam take: Most scans take less than 20 minutes.  What should I bring: Please bring any scans or X-rays taken at other hospitals, if similar tests were done. Also bring a list of your medicines, including vitamins, minerals and over-the-counter drugs. It is safest to leave personal items at home.  Do I need a : No  is needed.  What do I need to tell my doctor? Be sure to tell your doctor: * If you have any allergies. * If there s any chance you are pregnant. * If you are breastfeeding.  What should I do after the exam: No restrictions, You may resume normal activities.  What is this test: A CT (computed tomography) scan is a series of pictures that allows us to look inside your body. The scanner creates images of the body in cross sections, much like slices of bread. This helps us see any problems more clearly. You may receive contrast (X-ray dye) before or during your scan. You will be asked to drink the contrast.  Who should I call with questions: If you have any questions, please call the Imaging Department where you will have your exam. Directions, parking instructions, and other information is available on our website, [email or web address].            Dec 28, 2018 10:45 AM CST   (Arrive by 10:30 AM)   Return Visit with Jovany Monk MD   [facility] ([facility])    [address] [place] [address]  [address]  [facility] [address] [phone]            Jan 14, 2019  2:00 PM CST   (Arrive by 1:45 PM)   RETURN DIABETES with Es Gallo MD   [facility] Endocrinology ([facility])    [address] [place] [address]  [address] [facility] [address] [phone]              Who to contact     If you have questions or need follow up information about today's clinic visit or your schedule please contact [facility] directly at [phone].  Normal or non-critical lab and imaging results will be communicated to you by MyChart, letter or phone within 4 business days after the clinic has received the results. If you do not hear from us within 7 days, please contact the clinic through [employer] or phone. If you have a critical or abnormal lab result, we will notify you by phone as soon as possible.  Submit refill requests through [employer] or call your pharmacy and they will forward the refill request to us. Please allow 3 business days for your refill to be completed.          Additional Information About Your Visit        [employer] Information     [employer] gives you secure access to your electronic health record. If you see a primary care provider, you can also send messages to your care team and make appointments. If you have questions, please call your primary care clinic.  If you do not have a primary care provider, please call [phone] and they will assist you.        Care EveryWhere ID     This is your Care EveryWhere ID. This could be used by other organizations to access your [place] medical records  [id number]         Blood Pressure from Last 3 Encounters:   10/09/18 (!) 87/60   10/05/18 92/60   09/24/18 111/51    Weight from Last 3 Encounters:   10/09/18 68 kg (150 lb)   10/05/18 67.2 kg (148 lb 3.2 oz)   09/24/18 67.5 kg (148 lb 12.8 oz)              Today, you had the following     No orders found for display       Primary Care Provider Office Phone # Fax #    Solange Mcgill [phone] [address] [facility] [address]        Equal Access to Services     CHASE DE GUZMAN AH: Tre Ruiz, solange rasmussen, re hernandez  "arun yip. So [facility] [phone].    ATENCIÓN: Si habla pedro, tiene a quiros disposición servicios gratuitos de asistencia lingüística. Macy al [phone].    We comply with applicable federal civil rights laws and [place] laws. We do not discriminate on the basis of race, color, national origin, age, disability, sex, sexual orientation, or gender identity.            Thank you!     Thank you for choosing [facility]  for your care. Our goal is always to provide you with excellent care. Hearing back from our patients is one way we can continue to improve our services. Please take a few minutes to complete the written survey that you may receive in the mail after your visit with us. Thank you!             Your Updated Medication List - Protect others around you: Learn how to safely use, store and throw away your medicines at www.disposemymeds.org.          This list is accurate as of 10/30/18  1:56 PM.  Always use your most recent med list.                   Brand Name Dispense Instructions for use Diagnosis    acetaminophen 500 MG tablet    TYLENOL    100 tablet    Take 2 tablets (1,000 mg) by mouth every 8 hours    Acute post-operative pain       amylase-lipase-protease 31934-97239 units Cpep per EC capsule    CREON    450 capsule    Take 2-3 with meals / 1-2 with snacks, up to 15 per day.    Necrotizing pancreatitis       BD insulin syringe ultrafine 31G X 5/16\" 0.3 ML   Generic drug:  insulin syringe-needle U-100           blood glucose monitoring test strip    ONETOUCH VERIO IQ    400 each    Use to test blood sugar 4 times daily or as directed.    Type 2 diabetes mellitus with diabetic polyneuropathy, with long-term current use of insulin (H)       capecitabine 500 MG tablet CHEMO    XELODA    22 tablet    Take 2 tablets (1,000 mg) by mouth 2 times daily for 21 days Days 1 through 21, then 7 days off. Take within 30 mins after a meal.    Pancreatic adenocarcinoma (H)       " metFORMIN 500 MG 24 hr tablet    GLUCOPHAGE-XR    60 tablet    Take 1 tablet (500 mg) by mouth daily (with dinner)        ONETOUCH DELICA LANCETS 33G Misc     100 each    4 lancets daily    Type 2 diabetes mellitus with diabetic polyneuropathy, with long-term current use of insulin (H)       pantoprazole 40 MG EC tablet    PROTONIX    30 tablet    Take 1 tablet (40 mg) by mouth every morning    Pancreatic adenocarcinoma (H)       polyethylene glycol Packet    MIRALAX/GLYCOLAX    30 packet    Take 17 g by mouth daily    Drug-induced constipation

## 2018-10-30 NOTE — PROGRESS NOTES
Infusion Nursing Note:  Kitty Bangura presents today for PAC flush.    Patient seen by provider today: No   present during visit today: Not Applicable.    Note: N/A.    Intravenous Access:  Implanted Port.    Treatment Conditions:  Not Applicable.      Post Infusion Assessment:  NA.    Discharge Plan:   Patient discharged in stable condition accompanied by: self.  Departure Mode: Ambulatory.    Key Garcia RN

## 2018-10-30 NOTE — TELEPHONE ENCOUNTER
David Jaime -     Sorry I'm behind but I think everything is going well.  I do however, need a new prescription from Dr. Bustillos for the metformin. The one I have is for 1 pill per day so they won't refill it until late November at which time I'll definitely be out. Have a little over a week left.  [facility] is fine again.    Thank you!!    --   Kitty Bangura

## 2018-10-31 RX ORDER — METFORMIN HCL 500 MG
TABLET, EXTENDED RELEASE 24 HR ORAL
Qty: 270 TABLET | Refills: 3 | Status: SHIPPED | OUTPATIENT
Start: 2018-10-31 | End: 2019-07-22

## 2018-12-07 ENCOUNTER — MYC REFILL (OUTPATIENT)
Dept: ONCOLOGY | Facility: CLINIC | Age: 58
End: 2018-12-07

## 2018-12-07 DIAGNOSIS — C25.9 PANCREATIC ADENOCARCINOMA (H): ICD-10-CM

## 2018-12-07 RX ORDER — PANTOPRAZOLE SODIUM 40 MG/1
40 TABLET, DELAYED RELEASE ORAL EVERY MORNING
Qty: 30 TABLET | Refills: 0 | Status: SHIPPED | OUTPATIENT
Start: 2018-12-07 | End: 2019-08-29

## 2018-12-07 NOTE — TELEPHONE ENCOUNTER
Message from Anat:  Original authorizing provider: Kellie Nobles PA-C    Kitty Bangura would like a refill of the following medications:  pantoprazole (PROTONIX) 40 MG EC tablet [Kellie Nobles PA-C]    Preferred pharmacy: [facility] - [place], [place] - [address]    Comment:  Started having serious indigestion issues this week. Nurse suggested going back on this If you could request renewal to [facility] in [place] that would be most convenient. Otherwise doing pretty well. Have a great [place] holiday!

## 2018-12-09 ENCOUNTER — ANESTHESIA EVENT (OUTPATIENT)
Dept: GASTROENTEROLOGY | Facility: CLINIC | Age: 58
End: 2018-12-09
Payer: COMMERCIAL

## 2018-12-09 ASSESSMENT — LIFESTYLE VARIABLES: TOBACCO_USE: 1

## 2018-12-09 NOTE — ANESTHESIA PREPROCEDURE EVALUATION
Anesthesia Pre-Procedure Evaluation    Patient: Kitty Bangura   MRN: [id number] : 1960          Preoperative Diagnosis: screening    Procedure(s):  colonoscopy    Past Medical History:   Diagnosis Date     Necrotizing pancreatitis      Pancreatic cancer (H)      PONV (postoperative nausea and vomiting)      Past Surgical History:   Procedure Laterality Date     ABDOMEN SURGERY      Ruptured tubal pregnancies, additional surgeries followed     BACK SURGERY      L5, S1 discectomy     BREAST SURGERY      Breast reduction     COLONOSCOPY       ENDOSCOPIC RETROGRADE CHOLANGIOPANCREATOGRAM N/A 2018    Procedure: COMBINED ENDOSCOPIC RETROGRADE CHOLANGIOPANCREATOGRAPHY, PLACE TUBE/STENT;  Endoscopic retrograde cholangiopancreatogram with stent placement and cyst drainage bile ;  Surgeon: Vito Sanches MD;  Location: [facility] OR     ENDOSCOPIC ULTRASOUND LOWER GASTROINTESTIONAL TRACT (GI) N/A 2018    Procedure: ENDOSCOPIC ULTRASOUND LOWER GASTROINTESTIONAL TRACT (GI);  Endoscopic Ultrasound with guided Cyst-Gastrostomy;  Surgeon: González Metz MD;  Location: [facility] OR     ENDOSCOPIC ULTRASOUND, ESOPHAGOSCOPY, GASTROSCOPY, DUODENOSCOPY (EGD), NECROSECTOMY N/A 2017    Procedure: ENDOSCOPIC ULTRASOUND, ESOPHAGOSCOPY, GASTROSCOPY, DUODENOSCOPY (EGD), NECROSECTOMY;  Esophagogastroduodenoscopy, with  Necrosectomy and stents replacement  ;  Surgeon: González Metz MD;  Location: [facility] OR     ENDOSCOPIC ULTRASOUND, ESOPHAGOSCOPY, GASTROSCOPY, DUODENOSCOPY (EGD), NECROSECTOMY N/A 2017    Procedure: ENDOSCOPIC ULTRASOUND, ESOPHAGOSCOPY, GASTROSCOPY, DUODENOSCOPY (EGD), NECROSECTOMY;  Esophagogastroduodenoscopy with Necrosectomy, Balloon Dilation, stent removal X3 and Cystgastrostomy stent Placement X2;  Surgeon: Guru Cecilia Staples MD;  Location: [facility] OR     ESOPHAGOSCOPY, GASTROSCOPY, DUODENOSCOPY (EGD), COMBINED N/A 2017    Procedure: COMBINED ENDOSCOPIC  ULTRASOUND, ESOPHAGOSCOPY, GASTROSCOPY, DUODENOSCOPY (EGD), FINE NEEDLE ASPIRATE/BIOPSY;  Endoscopic Ultrasound with cystgastrostomy and fine needle aspiration ;  Surgeon: González Metz MD;  Location:  OR     ESOPHAGOSCOPY, GASTROSCOPY, DUODENOSCOPY (EGD), COMBINED N/A 2018    Procedure: COMBINED ESOPHAGOSCOPY, GASTROSCOPY, DUODENOSCOPY (EGD);  EGD;  Surgeon: González Metz MD;  Location:  GI     ESOPHAGOSCOPY, GASTROSCOPY, DUODENOSCOPY (EGD), COMBINED N/A 2018    Procedure: COMBINED ESOPHAGOSCOPY, GASTROSCOPY, DUODENOSCOPY (EGD), REMOVE FOREIGN BODY;;  Surgeon: González Metz MD;  Location:  GI     GYN SURGERY  1983    Multiple ectopic pregnancies, reconnect,       INSERT PORT VASCULAR ACCESS Right 2018    Procedure: INSERT PORT VASCULAR ACCESS;  Chest Port Placement - right;  Surgeon: Chanda Lawson PA-C;  [facility]     LAPAROSCOPY DIAGNOSTIC (GENERAL) N/A 2018    Procedure: LAPAROSCOPY DIAGNOSTIC (GENERAL);  Diagnostic Laparoscopy; Open Distal Pancreatectomy And Splenectomy, splenic flexure mobilization, cholecystectomy, intraoperative ultrasound;  Surgeon: Ja Byrd MD;  [facility]     PANCREATECTOMY, SPLENECTOMY N/A 2018    Procedure: PANCREATECTOMY, SPLENECTOMY;;  Surgeon: Ja Byrd MD;  Location: [facility] OR       Anesthesia Evaluation     . Pt has had prior anesthetic. Type: General and MAC    History of anesthetic complications   - PONV        ROS/MED HX    ENT/Pulmonary:     (+)tobacco use, Past use , . .    Neurologic:  - neg neurologic ROS     Cardiovascular:     (+) ----. : . . . :. . Previous cardiac testing date:results:date: results:ECG reviewed date:18 results:Sinus rhythm  Normal ECG  No previous ECGs available date: results:          METS/Exercise Tolerance:  >4 METS   Hematologic:     (+) Anemia, Other Hematologic Disorder-Thrombocytopenia      Musculoskeletal:         GI/Hepatic:     (+) liver disease, Other  "GI/Hepatic Fatty liver      Renal/Genitourinary:         Endo:     (+) type II DM Last HgA1c: 6.1 date: 10/9/18 .      Psychiatric:  - neg psychiatric ROS       Infectious Disease:  - neg infectious disease ROS       Malignancy:   (+) Malignancy History of Other  Other CA Pancreas status post         Other:    - neg other ROS                      Physical Exam  Normal systems: cardiovascular, pulmonary and dental    Airway   Mallampati: II  TM distance: >3 FB  Neck ROM: full    Dental     Cardiovascular   Rhythm and rate: regular and normal      Pulmonary    breath sounds clear to auscultation            Lab Results   Component Value Date    WBC 6.7 09/24/2018    HGB 11.4 (L) 09/24/2018    HCT 34.8 (L) 09/24/2018     (H) 09/24/2018     09/24/2018    POTASSIUM 4.2 09/24/2018    CHLORIDE 107 09/24/2018    CO2 27 09/24/2018    BUN 18 09/24/2018    CR 0.69 09/24/2018     (H) 09/24/2018    ILIANA 9.2 09/24/2018    PHOS 3.7 07/16/2018    MAG 2.3 07/16/2018    ALBUMIN 3.6 09/24/2018    PROTTOTAL 7.4 09/24/2018    ALT 22 09/24/2018    AST 25 09/24/2018    ALKPHOS 104 09/24/2018    BILITOTAL 0.6 09/24/2018    LIPASE 71 (L) 02/11/2018    AMYLASE 21 (L) 04/20/2018    INR 1.24 (H) 04/17/2018       Preop Vitals  BP Readings from Last 3 Encounters:   10/09/18 (!) 87/60   10/05/18 92/60   09/24/18 111/51    Pulse Readings from Last 3 Encounters:   10/09/18 76   10/05/18 67   09/24/18 75      Resp Readings from Last 3 Encounters:   10/05/18 16   09/24/18 16   09/11/18 16    SpO2 Readings from Last 3 Encounters:   10/05/18 98%   09/24/18 97%   09/11/18 100%      Temp Readings from Last 1 Encounters:   10/05/18 36.4  C (97.6  F) (Tympanic)    Ht Readings from Last 1 Encounters:   10/05/18 1.651 m (5' 5\")      Wt Readings from Last 1 Encounters:   10/09/18 68 kg (150 lb)    Estimated body mass index is 24.96 kg/m  as calculated from the following:    Height as of 10/5/18: 1.651 m (5' 5\").    Weight as of 10/9/18: 68 " kg (150 lb).       Anesthesia Plan      History & Physical Review  History and physical reviewed and following examination; no interval change.    ASA Status:  3 .        Plan for MAC and General with Intravenous and Propofol induction. Maintenance will be TIVA.  Reason for MAC:  Deep or markedly invasive procedure (G8)         Postoperative Care      Consents  Anesthetic plan, risks, benefits and alternatives discussed with:  Patient..                 TYLER Cerda CRNA

## 2018-12-11 ENCOUNTER — HOSPITAL ENCOUNTER (OUTPATIENT)
Facility: CLINIC | Age: 58
Discharge: HOME | End: 2018-12-11
Attending: SURGERY | Admitting: SURGERY
Payer: COMMERCIAL

## 2018-12-11 ENCOUNTER — ANESTHESIA (OUTPATIENT)
Dept: GASTROENTEROLOGY | Facility: CLINIC | Age: 58
End: 2018-12-11
Payer: COMMERCIAL

## 2018-12-11 VITALS
DIASTOLIC BLOOD PRESSURE: 73 MMHG | TEMPERATURE: 98.2 F | HEIGHT: 65 IN | BODY MASS INDEX: 23.32 KG/M2 | OXYGEN SATURATION: 99 % | RESPIRATION RATE: 16 BRPM | WEIGHT: 140 LBS | SYSTOLIC BLOOD PRESSURE: 93 MMHG | HEART RATE: 67 BPM

## 2018-12-11 LAB
COLONOSCOPY: NORMAL
GLUCOSE BLDC GLUCOMTR-MCNC: 109 MG/DL (ref 70–99)

## 2018-12-11 PROCEDURE — G0121 COLON CA SCRN NOT HI RSK IND: HCPCS | Performed by: SURGERY

## 2018-12-11 PROCEDURE — 82962 GLUCOSE BLOOD TEST: CPT

## 2018-12-11 PROCEDURE — 25000125 ZZHC RX 250: Performed by: NURSE ANESTHETIST, CERTIFIED REGISTERED

## 2018-12-11 PROCEDURE — 25000128 H RX IP 250 OP 636: Performed by: NURSE ANESTHETIST, CERTIFIED REGISTERED

## 2018-12-11 PROCEDURE — 25000128 H RX IP 250 OP 636: Performed by: SURGERY

## 2018-12-11 PROCEDURE — 45378 DIAGNOSTIC COLONOSCOPY: CPT | Performed by: SURGERY

## 2018-12-11 PROCEDURE — 37000008 ZZH ANESTHESIA TECHNICAL FEE, 1ST 30 MIN: Performed by: SURGERY

## 2018-12-11 RX ORDER — GLYCOPYRROLATE 0.2 MG/ML
INJECTION, SOLUTION INTRAMUSCULAR; INTRAVENOUS PRN
Status: DISCONTINUED | OUTPATIENT
Start: 2018-12-11 | End: 2018-12-11

## 2018-12-11 RX ORDER — SODIUM CHLORIDE, SODIUM LACTATE, POTASSIUM CHLORIDE, CALCIUM CHLORIDE 600; 310; 30; 20 MG/100ML; MG/100ML; MG/100ML; MG/100ML
INJECTION, SOLUTION INTRAVENOUS CONTINUOUS
Status: DISCONTINUED | OUTPATIENT
Start: 2018-12-11 | End: 2018-12-11 | Stop reason: HOSPADM

## 2018-12-11 RX ORDER — PROPOFOL 10 MG/ML
INJECTION, EMULSION INTRAVENOUS CONTINUOUS PRN
Status: DISCONTINUED | OUTPATIENT
Start: 2018-12-11 | End: 2018-12-11

## 2018-12-11 RX ORDER — LIDOCAINE 40 MG/G
CREAM TOPICAL
Status: DISCONTINUED | OUTPATIENT
Start: 2018-12-11 | End: 2018-12-11 | Stop reason: HOSPADM

## 2018-12-11 RX ORDER — ONDANSETRON 2 MG/ML
4 INJECTION INTRAMUSCULAR; INTRAVENOUS
Status: DISCONTINUED | OUTPATIENT
Start: 2018-12-11 | End: 2018-12-11 | Stop reason: HOSPADM

## 2018-12-11 RX ORDER — PROPOFOL 10 MG/ML
INJECTION, EMULSION INTRAVENOUS PRN
Status: DISCONTINUED | OUTPATIENT
Start: 2018-12-11 | End: 2018-12-11

## 2018-12-11 RX ORDER — HEPARIN SODIUM (PORCINE) LOCK FLUSH IV SOLN 100 UNIT/ML 100 UNIT/ML
500 SOLUTION INTRAVENOUS ONCE
Status: COMPLETED | OUTPATIENT
Start: 2018-12-11 | End: 2018-12-11

## 2018-12-11 RX ADMIN — GLYCOPYRROLATE 0.2 MG: 0.2 INJECTION, SOLUTION INTRAMUSCULAR; INTRAVENOUS at 09:11

## 2018-12-11 RX ADMIN — PHENYLEPHRINE HYDROCHLORIDE 100 MCG: 10 INJECTION, SOLUTION INTRAMUSCULAR; INTRAVENOUS; SUBCUTANEOUS at 09:09

## 2018-12-11 RX ADMIN — SODIUM CHLORIDE, POTASSIUM CHLORIDE, SODIUM LACTATE AND CALCIUM CHLORIDE: 600; 310; 30; 20 INJECTION, SOLUTION INTRAVENOUS at 08:06

## 2018-12-11 RX ADMIN — PROPOFOL 200 MCG/KG/MIN: 10 INJECTION, EMULSION INTRAVENOUS at 09:05

## 2018-12-11 RX ADMIN — SODIUM CHLORIDE, PRESERVATIVE FREE 500 UNITS: 5 INJECTION INTRAVENOUS at 10:34

## 2018-12-11 RX ADMIN — PROPOFOL 100 MG: 10 INJECTION, EMULSION INTRAVENOUS at 09:05

## 2018-12-11 RX ADMIN — LIDOCAINE HYDROCHLORIDE 1 ML: 10 INJECTION, SOLUTION EPIDURAL; INFILTRATION; INTRACAUDAL; PERINEURAL at 08:06

## 2018-12-11 RX ADMIN — PHENYLEPHRINE HYDROCHLORIDE 100 MCG: 10 INJECTION, SOLUTION INTRAMUSCULAR; INTRAVENOUS; SUBCUTANEOUS at 09:07

## 2018-12-11 RX ADMIN — SODIUM CHLORIDE, POTASSIUM CHLORIDE, SODIUM LACTATE AND CALCIUM CHLORIDE: 600; 310; 30; 20 INJECTION, SOLUTION INTRAVENOUS at 09:02

## 2018-12-11 RX ADMIN — LIDOCAINE HYDROCHLORIDE 40 MG: 10 INJECTION, SOLUTION EPIDURAL; INFILTRATION; INTRACAUDAL; PERINEURAL at 09:05

## 2018-12-11 ASSESSMENT — MIFFLIN-ST. JEOR: SCORE: 1205.92

## 2018-12-11 NOTE — ANESTHESIA POSTPROCEDURE EVALUATION
Patient: Kitty Bangura    Procedure(s):  colonoscopy    Diagnosis:screening  Diagnosis Additional Information: No value filed.    Anesthesia Type:  MAC, General    Note:  Anesthesia Post Evaluation    Patient location during evaluation: Phase 2 and Bedside  Patient participation: Able to fully participate in evaluation  Level of consciousness: awake and alert  Pain management: adequate  Airway patency: patent  Cardiovascular status: acceptable  Respiratory status: acceptable  Hydration status: acceptable  PONV: none     Anesthetic complications: None          Last vitals:  Vitals:    12/11/18 0745 12/11/18 0922   BP: 107/65 99/70   Pulse: 67    Resp: 16 16   Temp: 36.5  C (97.7  F) 36.8  C (98.2  F)   SpO2: 98%          Electronically Signed By: Sam Hodge CRNA, APRN CRNA  December 11, 2018  9:33 AM

## 2018-12-11 NOTE — H&P
58 year old year old female here for colonoscopy for screening.    Patient Active Problem List   Diagnosis     Acute pancreatitis     Leukocytosis     Fatty liver     Pancreatic mass     Pancreatitis     Necrotizing pancreatitis     Pancreatic adenocarcinoma (H)     Dehydration     Hypernatremia     Hypotension     Anemia     Thrombocytopenia (H)     Hyperglycemia     Diabetes mellitus, type 2 (H)       Past Medical History:   Diagnosis Date     Necrotizing pancreatitis      Pancreatic cancer (H)      PONV (postoperative nausea and vomiting)        Past Surgical History:   Procedure Laterality Date     ABDOMEN SURGERY  1983    Ruptured tubal pregnancies, additional surgeries followed     BACK SURGERY  2004    L5, S1 discectomy     BREAST SURGERY  2003    Breast reduction     COLONOSCOPY  2008     ENDOSCOPIC RETROGRADE CHOLANGIOPANCREATOGRAM N/A 1/25/2018    Procedure: COMBINED ENDOSCOPIC RETROGRADE CHOLANGIOPANCREATOGRAPHY, PLACE TUBE/STENT;  Endoscopic retrograde cholangiopancreatogram with stent placement and cyst drainage bile ;  Surgeon: Vito Sanches MD;  [facility]     ENDOSCOPIC ULTRASOUND LOWER GASTROINTESTIONAL TRACT (GI) N/A 1/25/2018    Procedure: ENDOSCOPIC ULTRASOUND LOWER GASTROINTESTIONAL TRACT (GI);  Endoscopic Ultrasound with guided Cyst-Gastrostomy;  Surgeon: González Metz MD;  [facility] OR     ENDOSCOPIC ULTRASOUND, ESOPHAGOSCOPY, GASTROSCOPY, DUODENOSCOPY (EGD), NECROSECTOMY N/A 12/4/2017    Procedure: ENDOSCOPIC ULTRASOUND, ESOPHAGOSCOPY, GASTROSCOPY, DUODENOSCOPY (EGD), NECROSECTOMY;  Esophagogastroduodenoscopy, with  Necrosectomy and stents replacement  ;  Surgeon: González Metz MD;  [facility] OR     ENDOSCOPIC ULTRASOUND, ESOPHAGOSCOPY, GASTROSCOPY, DUODENOSCOPY (EGD), NECROSECTOMY N/A 12/12/2017    Procedure: ENDOSCOPIC ULTRASOUND, ESOPHAGOSCOPY, GASTROSCOPY, DUODENOSCOPY (EGD), NECROSECTOMY;  Esophagogastroduodenoscopy with Necrosectomy, Balloon Dilation,  stent removal X3 and Cystgastrostomy stent Placement X2;  Surgeon: Guru Cecilia Staples MD;  Location:  OR     ESOPHAGOSCOPY, GASTROSCOPY, DUODENOSCOPY (EGD), COMBINED N/A 2017    Procedure: COMBINED ENDOSCOPIC ULTRASOUND, ESOPHAGOSCOPY, GASTROSCOPY, DUODENOSCOPY (EGD), FINE NEEDLE ASPIRATE/BIOPSY;  Endoscopic Ultrasound with cystgastrostomy and fine needle aspiration ;  Surgeon: González Metz MD;  Location:  OR     ESOPHAGOSCOPY, GASTROSCOPY, DUODENOSCOPY (EGD), COMBINED N/A 2018    Procedure: COMBINED ESOPHAGOSCOPY, GASTROSCOPY, DUODENOSCOPY (EGD);  EGD;  Surgeon: González Metz MD;  Location:  GI     ESOPHAGOSCOPY, GASTROSCOPY, DUODENOSCOPY (EGD), COMBINED N/A 2018    Procedure: COMBINED ESOPHAGOSCOPY, GASTROSCOPY, DUODENOSCOPY (EGD), REMOVE FOREIGN BODY;;  Surgeon: González Metz MD;  Location:  GI     GYN SURGERY  1983    Multiple ectopic pregnancies, reconnect,       INSERT PORT VASCULAR ACCESS Right 2018    Procedure: INSERT PORT VASCULAR ACCESS;  Chest Port Placement - right;  Surgeon: Chanda Lawson PA-C;  Location:  OR     LAPAROSCOPY DIAGNOSTIC (GENERAL) N/A 2018    Procedure: LAPAROSCOPY DIAGNOSTIC (GENERAL);  Diagnostic Laparoscopy; Open Distal Pancreatectomy And Splenectomy, splenic flexure mobilization, cholecystectomy, intraoperative ultrasound;  Surgeon: Ja Byrd MD;  [facility]     PANCREATECTOMY, SPLENECTOMY N/A 2018    Procedure: PANCREATECTOMY, SPLENECTOMY;;  Surgeon: Ja Byrd MD;  Location:  OR       @[facility]@    No current outpatient medications on file.       No Known Allergies    Pt reports that she quit smoking about 37 years ago. Her smoking use included cigarettes. She started smoking about 44 years ago. She has a 2.00 pack-year smoking history. she has never used smokeless tobacco. She reports that she does not drink alcohol or use drugs.    Exam:  /65 (BP Location: Left  "arm)   Pulse 67   Temp 97.7  F (36.5  C) (Oral)   Resp 16   Ht 1.651 m (5' 5\")   Wt 63.5 kg (140 lb)   SpO2 98%   BMI 23.30 kg/m      Awake, Alert OX3  Lungs - CTA bilaterally  CV - RRR, no murmurs, distal pulses intact  Abd - soft, non-distended, non-tender, +BS  Extr - No cyanosis or edema    A/P 58 year old year old female in need of colonoscopy for screening. Risks, benefits, alternatives, and complications were discussed including the possibility of perforation and the patient agreed to proceed    Lobito Marte MD   "

## 2018-12-11 NOTE — BRIEF OP NOTE
[facility]    Brief Operative Note    Pre-operative diagnosis: screening  Post-operative diagnosis single diverticulum, otherwise normal  Procedure: Procedure(s):  colonoscopy  Surgeon: Surgeon(s) and Role:     * Lobito Marte MD - Primary  Anesthesia: Monitor Anesthesia Care   Estimated blood loss: None  Drains: None  Specimens: * No specimens in log *  Findings:   None.  Complications: None.  Implants: None.

## 2018-12-11 NOTE — ANESTHESIA CARE TRANSFER NOTE
Patient: Kitty Bangura    Procedure(s):  colonoscopy    Diagnosis: screening  Diagnosis Additional Information: No value filed.    Anesthesia Type:   MAC, General     Note:  Airway :Nasal Cannula  Patient transferred to:Phase II  Handoff Report: Identifed the Patient, Identified the Reponsible Provider, Reviewed the pertinent medical history, Discussed the surgical course, Reviewed Intra-OP anesthesia mangement and issues during anesthesia, Set expectations for post-procedure period and Allowed opportunity for questions and acknowledgement of understanding      Vitals: (Last set prior to Anesthesia Care Transfer)    CRNA VITALS  12/11/2018 0853 - 12/11/2018 0924      12/11/2018             Pulse:  57    Ht Rate:  53    SpO2:  99 %                Electronically Signed By: Sam Hodge CRNA, APRN CRNA  December 11, 2018  9:24 AM

## 2018-12-13 NOTE — ANESTHESIA PROCEDURE NOTES
Arterial Line Procedure Note  Staff:     Anesthesiologist:  Mao Perez MD  Location: In OR After Induction  Line Placement:     Procedure:  Arterial Line    Arterial waveform: Yes      IBP within 10% of NIBP: Yes

## 2018-12-13 NOTE — ADDENDUM NOTE
Addendum  created 12/13/18 1010 by Mao Perez MD    Child order released for a procedure order, Intraprocedure Blocks edited, LDA created via procedure documentation, Sign clinical note

## 2018-12-21 ENCOUNTER — INFUSION THERAPY VISIT (OUTPATIENT)
Dept: INFUSION THERAPY | Facility: CLINIC | Age: 58
End: 2018-12-21
Attending: INTERNAL MEDICINE
Payer: COMMERCIAL

## 2018-12-21 ENCOUNTER — HOSPITAL ENCOUNTER (OUTPATIENT)
Dept: CT IMAGING | Facility: CLINIC | Age: 58
Discharge: HOME | End: 2018-12-21
Attending: INTERNAL MEDICINE | Admitting: INTERNAL MEDICINE
Payer: COMMERCIAL

## 2018-12-21 DIAGNOSIS — C25.1 MALIGNANT NEOPLASM OF BODY OF PANCREAS (H): ICD-10-CM

## 2018-12-21 DIAGNOSIS — Z79.899 ENCOUNTER FOR LONG-TERM (CURRENT) USE OF MEDICATIONS: ICD-10-CM

## 2018-12-21 LAB
ALBUMIN SERPL-MCNC: 3.7 G/DL (ref 3.4–5)
ALP SERPL-CCNC: 97 U/L (ref 40–150)
ALT SERPL W P-5'-P-CCNC: 22 U/L (ref 0–50)
ANION GAP SERPL CALCULATED.3IONS-SCNC: 5 MMOL/L (ref 3–14)
AST SERPL W P-5'-P-CCNC: 20 U/L (ref 0–45)
BASOPHILS # BLD AUTO: 0.1 10E9/L (ref 0–0.2)
BASOPHILS NFR BLD AUTO: 0.8 %
BILIRUB SERPL-MCNC: 0.5 MG/DL (ref 0.2–1.3)
BUN SERPL-MCNC: 16 MG/DL (ref 7–30)
CALCIUM SERPL-MCNC: 9.1 MG/DL (ref 8.5–10.1)
CHLORIDE SERPL-SCNC: 103 MMOL/L (ref 94–109)
CO2 SERPL-SCNC: 30 MMOL/L (ref 20–32)
CREAT SERPL-MCNC: 0.59 MG/DL (ref 0.52–1.04)
DIFFERENTIAL METHOD BLD: NORMAL
EOSINOPHIL # BLD AUTO: 0.2 10E9/L (ref 0–0.7)
EOSINOPHIL NFR BLD AUTO: 1.8 %
ERYTHROCYTE [DISTWIDTH] IN BLOOD BY AUTOMATED COUNT: 14.1 % (ref 10–15)
GFR SERPL CREATININE-BSD FRML MDRD: >90 ML/MIN/{1.73_M2}
GLUCOSE SERPL-MCNC: 107 MG/DL (ref 70–99)
HCT VFR BLD AUTO: 39.8 % (ref 35–47)
HGB BLD-MCNC: 13.1 G/DL (ref 11.7–15.7)
IMM GRANULOCYTES # BLD: 0 10E9/L (ref 0–0.4)
IMM GRANULOCYTES NFR BLD: 0.2 %
LYMPHOCYTES # BLD AUTO: 3.6 10E9/L (ref 0.8–5.3)
LYMPHOCYTES NFR BLD AUTO: 43.3 %
MCH RBC QN AUTO: 31.5 PG (ref 26.5–33)
MCHC RBC AUTO-ENTMCNC: 32.9 G/DL (ref 31.5–36.5)
MCV RBC AUTO: 96 FL (ref 78–100)
MONOCYTES # BLD AUTO: 0.9 10E9/L (ref 0–1.3)
MONOCYTES NFR BLD AUTO: 11.2 %
NEUTROPHILS # BLD AUTO: 3.6 10E9/L (ref 1.6–8.3)
NEUTROPHILS NFR BLD AUTO: 42.7 %
NRBC # BLD AUTO: 0 10*3/UL
NRBC BLD AUTO-RTO: 0 /100
PLATELET # BLD AUTO: 344 10E9/L (ref 150–450)
POTASSIUM SERPL-SCNC: 4.2 MMOL/L (ref 3.4–5.3)
PROT SERPL-MCNC: 7.1 G/DL (ref 6.8–8.8)
RBC # BLD AUTO: 4.16 10E12/L (ref 3.8–5.2)
SODIUM SERPL-SCNC: 138 MMOL/L (ref 133–144)
WBC # BLD AUTO: 8.3 10E9/L (ref 4–11)

## 2018-12-21 PROCEDURE — 25000128 H RX IP 250 OP 636: Performed by: RADIOLOGY

## 2018-12-21 PROCEDURE — 74177 CT ABD & PELVIS W/CONTRAST: CPT

## 2018-12-21 PROCEDURE — 80053 COMPREHEN METABOLIC PANEL: CPT | Performed by: INTERNAL MEDICINE

## 2018-12-21 PROCEDURE — 36591 DRAW BLOOD OFF VENOUS DEVICE: CPT

## 2018-12-21 PROCEDURE — 86301 IMMUNOASSAY TUMOR CA 19-9: CPT | Performed by: INTERNAL MEDICINE

## 2018-12-21 PROCEDURE — 25000125 ZZHC RX 250: Performed by: RADIOLOGY

## 2018-12-21 PROCEDURE — 85025 COMPLETE CBC W/AUTO DIFF WBC: CPT | Performed by: INTERNAL MEDICINE

## 2018-12-21 RX ORDER — IOPAMIDOL 755 MG/ML
68 INJECTION, SOLUTION INTRAVASCULAR ONCE
Status: COMPLETED | OUTPATIENT
Start: 2018-12-21 | End: 2018-12-21

## 2018-12-21 RX ADMIN — SODIUM CHLORIDE 57 ML: 9 INJECTION, SOLUTION INTRAVENOUS at 09:40

## 2018-12-21 RX ADMIN — IOPAMIDOL 68 ML: 755 INJECTION, SOLUTION INTRAVENOUS at 09:40

## 2018-12-21 NOTE — PROGRESS NOTES
Infusion Nursing Note:  iKtty Bangura presents today for PAC labs and port access   Patient seen by provider today: No   present during visit today: Not Applicable.    Note: N/A.    Intravenous Access:  Labs drawn without difficulty.  Implanted Port.    Treatment Conditions:  Not Applicable.      Post Infusion Assessment:  Patient tolerated PAC labs and access without incident.  Blood return noted pre and post infusion.  Site patent and intact, free from redness, edema or discomfort.  No evidence of extravasations.    Discharge Plan:   Patient discharged in stable condition accompanied by: self.  Departure Mode: Ambulatory.  Pt left with port accessed and discharged to have CT scan done at 9:30 am.     Lissa Lord RN

## 2018-12-22 LAB — CANCER AG19-9 SERPL-ACNC: 2 U/ML (ref 0–37)

## 2018-12-28 NOTE — PROGRESS NOTES
Oncology Follow-up Visit:  Dec 31, 2018    Cancer diagnosis: cystic pancreatic tail adenocarcinoma  small subcentimeter pulmonary nodules seen on staging scans of unclear etiology.     Treatment to date: neoadjuvant FOLFIRINOX x4 cycles, 1/15/18-3/6/18  Difficulty tolerating neoadjuvant intent chemotherapy. Distal pancreatectomy performed April 17, 2018, no residual carcinoma seen on operative pathology.  Treated with four cycles of adjuvant chemotherapy with gemcitabine and Xeloda, completed September 2018.     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     Referring physician: Dr. González Metz MD      Oncology History: She was previously healthy until she presented in early November 2017 with abdominal pain. CT abdomen on 11/1/17 showed acute pancreatitis (lipase 41,960) and a 3cm heterogenous pancreatic tail mass. The etiology of pancreatitis is unclear - no obstructing gallstone and not a heavy drinker. She was hospitalized for one week and followed up with Dr. González Metz in GI clinic.     11/29/17 -- endoscopic drainage of walled-off area of pancreatic necrosis by Dr. Metz. During the same procedure she had an EUS FNA of the pancreatic tail mass and pathology showed adenocarcinoma. The mass measured 33 x 27 mm, encapsulated with solid and cystic components, rim calcification, and no evidence of invasion.     12/9/17 -- Staging CT chest/abd/pelvis showed several small pulmonary nodules (largest 3mm), unchanged mass in the pancreatic tail, mildly prominent mesenteric nodes thought likely reactive, and small right hepatic lobe hypodensities. Abdominal MRI on 12/10/17 showed hepatic hemangiomas, no evidence of metastatic disease to the liver.     1/8/18 - - oncology consultation, Dr. Monk. Recommendation: neoadjuvant intent  chemotherapy with FOLFIRINOX.    1/15/18 - - cycle 1/day one FOLFIRINOX chemotherapy.    1/20 through 1/27/18 - - hospitalization at the [facility], for acute pancreatitis. Following three days of nausea, vomiting, pain. During this hospitalization: ERCP was attempted by Dr. Sanches with pancreatic stent placement, but pancreatic duct was completely obstructed and wire was unable to pass beyond the duct. Dr. Metz performed successful US guided cyst gastrostomy January 25, 2018.    1/31/18 - - oncology follow-up, DANTE Talavera. Neutropenic with ANC 0.4, thus defer cycle two of chemotherapy.    2/5/18 - - oncology follow-up, DANTE Junior.  Cycle 2/day one FOLFIRINOX chemotherapy. Patient endorses cold sensitivity secondary to oxaliplatin. Neulasta given for growth factor support. Due to significant neutropenia with cycle one.    2/11/18 - - ER evaluation for epigastric pain. Discharged to home from ER. Leukocytosis secondary to Neulasta given on February 8.    2/20/18 - - oncology follow-up, DANTE Junior. Cycle 3/day one FOLFIRINOX given without Neulasta. At patient request, oxaliplatin dose reduced by 10% due to neuropathy/cold sensitivity.    3/6/18 - - oncology follow-up, DANTE Talavera. Cycle 4/day one FOLFIRINOX chemotherapy.    3/13/18 - - CT chest/abdomen/pelvis - - IMPRESSION: 1. Minimal decrease in size and marked decrease in enhancement and pancreatic mass since the comparison study. 2. No significant change in low dense lesions in the liver since comparison.    3/16/18 - - oncology follow-up Dr. Monk. Plan is to hold on further chemotherapy as surgery is scheduled for 4/17/18 4/17/18 - - surgery: PROCEDURE: Diagnostic laparoscopy, splenic flexure mobilization, intraoperative ultrasound, distal pancreatectomy, splenectomy, and cholecystectomy. SURGEON: Ja Byrd MD  Final surgical pathology showed necrotizing pancreatitis, no evidence of residual  cancer, complete pathologic response, 26 benign lymph nodes.  FINAL DIAGNOSIS: A. DISTAL PANCREAS, SPLEEN AND OMENTUM, RESECTION: - Necrotizing pancreatitis with residual acellular mucin and foci of high grade dysplasia, status post neoadjuvant therapy   - Treatment response: complete (score 0)   - Twenty-six benign lymph nodes (0/26)   - Negative for residual carcinoma   - Pathologic staging: [id number]   - Focal infarction, metaplastic bone formation - Surgical margins are free of neoplasia - Unremarkable spleen and omentum   B. GALLBLADDER, CHOLECYSTECTOMY: - Benign gallbladder, biliary mucosa with no significant histologic abnormality - Negative for dysplasia COMMENT: Histologic sections demonstrate pools of acellular mucin, necrosis and scattered high grade dysplasia (PanIN-3) with no evidence of residual invasive carcinoma    4/30/18 - - surgical oncology follow-up, Dr. Byrd.  5/4/18 - - palliative care follow-up Dr. Snow. No specific recommendations required at this time.  5/14/18 - - oncology follow-up, Dr. Monk. Plan: adjuvant chemotherapy with gemcitabine and Xeloda.  5/30/18 - - cycle 1/day one adjuvant chemotherapy with gemcitabine and Xeloda.  Cycle three was dose reduced due to mucositis secondary to Xeloda.  8/28/18 - - oncology follow-up, DANTE Talavera. Cycle 4/day one gemcitabine and Xeloda.  9/17/18 - - CT chest/abdomen/pelvis - -IMPRESSION:  Resolving postoperative changes. No new findings or evidence of metastatic disease.  9/24/18 - - oncology follow-up, Dr. Monk. PLAN: initiate active surveillance.  12/21/18--CT c/a/p--IMPRESSION:  1. Stable tiny 2 to 3 mm lung nodules as described above. This  suggests a benign etiology.  2. Two low density liver lesions. These appear to vary in appearance  depending on slight variations in timing of the bolus of contrast.  Likely no significant change. Recommend continued close surveillance.  3. No evidence of recurrent mass in the abdomen or pelvis.  No  adenopathy.  18--scheduled oncology follow-up, Dr Monk. PATIENT NO-SHOW.  18--oncology follow-up, Dr Monk.  Continue surveillance.  Increase Creon dose due to fatty food intolerance/weight loss.     Interim History:    Feeling on the mend today from fever .  Fever was up to 101F.  Had several bouts of diarrhea day prior to appointment, but this has resolved.  Only took tylenol for fever.  No cough or SOB.  Several family members with flu recently.  She received flu shot early in the season and tolerated well.  Besides recent illness, she feels she is getting better overall.  Pain in minimal and intermittent/short lasting at most.  Mainly R sided abd pain, often associated with eating and causes nausea without vomiting.  Notes she also develops discomfort in RUQ when eating fatty foods.  Is taking Creon 3 tabs with meals, 1-2 tabs with snacks.  Creon is constipating for her, while her metformin causes diarrhea.  The combination of the two generally leads to relatively normal stools for her with occasional diarrhea.  Lost about 10 pounds since the last visit unintentionally.  Eating OK, but again avoiding fatty/rich foods.  Able to exercise 1-2 times daily with good tolerance.  Currently on sliding scale insulin, previously worked as [employer]. Denies neuropathy symptoms, but notes she is cold most of the time.     Review Of Systems:  Full 10-point ROS reviewed. Pertinent symptoms reviewed above per HPI.    PAST MEDICAL HISTORY:   Pre-diabetes  Pancreatitis as above     PAST SURGICAL HISTORY:  Laparotomy x2 for ruptured tubal pregnancies    Discectomy for herniated lumbar disk  Breast reduction surgery     FAMILY HISTORY:  Colon cancer in maternal aunt  Paternal aunt and cousins with breast cancer  CAD in father and brother     SH: , from [place], with 29 yr-old son. Works as [employer]. former smoker, quit 30 years ago. two glasses of wine per night, none since pancreatitis  "diagnosis     Allergies: none      Current Outpatient Medications:      acetaminophen (TYLENOL) 500 MG tablet, Take 2 tablets (1,000 mg) by mouth every 8 hours, Disp: 100 tablet, Rfl: 1     amylase-lipase-protease (CREON) 92607-93267 units CPEP per EC capsule, Take 2-3 with meals / 1-2 with snacks, up to 15 per day., Disp: 450 capsule, Rfl: 6     BD INSULIN SYRINGE ULTRAFINE 31G X 5/16\" 0.3 ML, , Disp: , Rfl:      blood glucose monitoring (ONETOUCH VERIO IQ) test strip, Use to test blood sugar 4 times daily or as directed., Disp: 400 each, Rfl: 3     metFORMIN (GLUCOPHAGE-XR) 500 MG 24 hr tablet, Take 2 at breakfast and 1 at dinner, Disp: 270 tablet, Rfl: 3     ONETOUCH DELICA LANCETS 33G MISC, 4 lancets daily, Disp: 100 each, Rfl: 3     pantoprazole (PROTONIX) 40 MG EC tablet, Take 1 tablet (40 mg) by mouth every morning, Disp: 30 tablet, Rfl: 0  No current facility-administered medications for this visit.     Facility-Administered Medications Ordered in Other Visits:      heparin 100 UNIT/ML injection 5 mL, 5 mL, Intracatheter, Once, Art Guevara MD      Physical Exam:    There were no vitals taken for this visit.  KPS 90  GENERAL:  NAD, pleasant.  Appears stated age  HEENT:  Anicteric sclerae.  No evidence of mucositis or thrush. No oral ulcers, MMM.   LYMPHATICS:  No cervical/supraclavicular LAD appreciated.   CARDIOVASCULAR:  Regular rate and rhythm.  No S3, murmurs, gallops or rubs.   LUNGS:  CTAB, normal WOB on RA   ABDOMEN:  Well-healed midline surgical scar without fluctuance or tenderness.  No masses appreciated. No HSM. Normal bowel sounds, non-tender to deep palpation, no guarding/rebound.    EXTREMITIES:  No lower extremity edema. [id number]    Laboratory/Imaging Studies    Results for orders placed or performed during the hospital encounter of 12/21/18   CT Chest/Abdomen/Pelvis w Contrast    Narrative    CT CHEST/ABDOMEN/PELVIS WITH CONTRAST 12/21/2018 9:56 AM     HISTORY: Malignant neoplasm of " body of pancreas (H).    TECHNIQUE: 68 mL Isovue 370. Radiation dose for this scan was reduced  using automated exposure control, adjustment of the mA and/or kV  according to patient size, or iterative reconstruction technique.    COMPARISON: Chest, abdomen and pelvis CT from 9/17/2018.    FINDINGS:     Chest CT: No mediastinal or hilar adenopathy. Tiny nodule right upper  lobe axial series 3 image 24, unchanged measuring approximately 2 mm.  2 mm nodule right upper lobe axial series 3 image 25. This was also  present on the prior study. 0.3 mm nodule, axial series 3 image 43,  likely representing an intrafissural nodule in the major fissure also  unchanged. Previously seen tiny nodule in the lingula is not  definitely visualized now and may have been a blood vessel on end. No  new nodules or nodular growth. No pleural fluid. MediPort type  catheter in place.    Abdomen/pelvis CT: There is a 1 cm low-density lesion in the right  lobe of the liver, axial series 2 image 64. This is slightly more  defined but smaller than on the prior study likely related to the  phase of contrast injection. A second subcentimeter low-density lesion  in the more-superior right lobe of the liver again is slightly more  well-defined likely due to the phase of contrast. It is likely similar  in size. No significant change. Recommend continued close  surveillance.    The spleen is absent. Body and tail of the pancreas is absent. Head  appears normal. No adrenal lesions. Kidneys are normal. No  retroperitoneal adenopathy or evidence of aortic aneurysm.    Scans through the pelvis demonstrate a decompressed bladder. Large  amount of stool in the colon may be related to constipation. No  evidence of bowel obstruction. No free air or free fluid. No  adenopathy.    No skeletal lesions.      Impression    IMPRESSION:  1. Stable tiny 2 to 3 mm lung nodules as described above. This  suggests a benign etiology.  2. Two low density liver lesions.  These appear to vary in appearance  depending on slight variations in timing of the bolus of contrast.  Likely no significant change. Recommend continued close surveillance.  3. No evidence of recurrent mass in the abdomen or pelvis. No  adenopathy.    CHRISTINE GROSSMAN MD     ASSESSMENT/PLAN:  Mrs. Bangura is a 57-year-old woman with resected pancreatic tail adenocarcinoma.  She had initial extensive and severe necrotizing pancreatitis upon initial diagnosis and hospitalization that delayed treatment.  We ultimately were able to begin neoadjuvant FOLFIRINOX for 4 cycles beginning in January.  By the time she had pancreatectomy by Dr. Byrd in mid April there was no evidence of residual carcinoma seen on operative pathology.  She completed subsequently 4 months of gemcitabine and Xeloda in the adjuvant setting.   She presents today for follow-up.  CT chest/abd/pelvis notes stable pulmonary nodules, stable hepatic hypodense lesions, and no evidence of disease in abdomen or pelvis. She is otherwise recovering.  Main complaint today is fatty/rich food intolerance, which we discussed may be due to inadequate creon dosing. She reports taking creon appropriately with meals and snacks.  Discussed that she should increase Creon dose by 1 capsule for each meal/snack for at least a week and see how this helps.  If not adequate response, then could consider addition of a second tab.  She and her  are in agreement with this plan and denied additional questions regarding the plan. Plan for follow-up labs and surveillance imaging in 3 months, which was ordered today.      -Follow-up with Dr. Monk in clinic in 3 months with CT CAP w/ contrast and labs prior.   -Increase creon by 1 tablet for each meal/snack x1 week.  If no improvement in bloating symptoms, can increase by 1 additional tab.    -Follow-up with GI as previously scheduled for management of diabetes.      Patient seen and discussed with Dr. Monk.      Claude Bonilla  MD, PhD  Hematology/Oncology Fellow  Pager: [phone]    MEDICAL ONCOLOGY ATTENDING PHYSICIAN ADDENDUM:  I have seen and evaluated this patient with the medical oncology fellow. I have reviewed and edited this fellow's note, and agree with the assessment and plan stated above. A complete review of systems was assessed, and pertinent positives/negatives are discussed in detail above and as follows.    Mrs. Bangura was scheduled to meet with me 3 days ago but had a flu-like illness that kept her at home.  She is feeling slightly better but still has some malaise.  She does not have any current fevers.  She has a scheduled appointment with the gastroenterologist next month to follow up on these issues she has been having regarding constipation on and off for over 4-5 weeks.  We confirmed that she has been taking 3 capsules of Creon with each meal and 1 with a snack.  I encouraged her to take 4 capsules of Creon with each meal and up to 2 with each snack for the next 7-10 days and titrate upward further as indicated.      She did resume pantoprazole on the recommendation of her physician after colonoscopy done 12/11 and this has helped somewhat as well.  Colonoscopy was quite unremarkable.  Recommendation on further colon cancer screening was she did not need to repeat another one for 10 years barring any new lower GI symptoms.      She will follow up with her gastroenterologist and will follow up with Dr. Gallo from our Endocrinology team regarding her diabetes which is under relatively good control with metformin.  She asked whether or not she could have a Pneumovax vaccine in the context of having had a splenectomy.  I think it would be perfectly appropriate.  She can do this through her primary care physician.      In terms of further followup for her pancreas cancer, we reviewed a CT chest, abdomen and pelvis and labs.  I printed out all the above for her today, reviewed them in full and provided her a printed copy by  the end of the visit.  I reviewed her scans personally.  The liver cysts that are homogeneous and hypodense are consistent over the last year and I reviewed scans going back for at least the last 8 months to confirm that they are unchanged in size and are very unlikely to represent any form of metastatic pancreatic cancer.  She will follow up with continued surveillance.  We will get a CT of the chest, abdomen and pelvis, CA 19-9, CBC and CMP in 3 months' time.  She will meet with me within a week to review the results at that time.           I spent 30 minutes in consultation, including H&P and Discussion. >50% of time was spent in counseling and in coordination of care.    Jovany Monk MD, PhD

## 2018-12-31 ENCOUNTER — ONCOLOGY VISIT (OUTPATIENT)
Dept: ONCOLOGY | Facility: CLINIC | Age: 58
End: 2018-12-31
Attending: INTERNAL MEDICINE
Payer: COMMERCIAL

## 2018-12-31 VITALS
DIASTOLIC BLOOD PRESSURE: 68 MMHG | OXYGEN SATURATION: 98 % | BODY MASS INDEX: 23.41 KG/M2 | TEMPERATURE: 97 F | WEIGHT: 140.5 LBS | SYSTOLIC BLOOD PRESSURE: 96 MMHG | HEIGHT: 65 IN | HEART RATE: 80 BPM | RESPIRATION RATE: 14 BRPM

## 2018-12-31 DIAGNOSIS — C25.9 MALIGNANT NEOPLASM OF PANCREAS, UNSPECIFIED LOCATION OF MALIGNANCY (H): Primary | ICD-10-CM

## 2018-12-31 PROCEDURE — G0463 HOSPITAL OUTPT CLINIC VISIT: HCPCS | Mod: ZF

## 2018-12-31 PROCEDURE — 99214 OFFICE O/P EST MOD 30 MIN: CPT | Mod: GC | Performed by: INTERNAL MEDICINE

## 2018-12-31 ASSESSMENT — PAIN SCALES - GENERAL: PAINLEVEL: NO PAIN (0)

## 2018-12-31 ASSESSMENT — MIFFLIN-ST. JEOR: SCORE: 1208.18

## 2018-12-31 NOTE — LETTER
12/31/2018       RE: Kitty Bangura  [address] [facility] [id number]     Dear Colleague,    Thank you for referring your patient, Kitty Bangura, to the [facility]. Please see a copy of my visit note below.    Oncology Follow-up Visit:  Dec 31, 2018    Cancer diagnosis: cystic pancreatic tail adenocarcinoma  small subcentimeter pulmonary nodules seen on staging scans of unclear etiology.     Treatment to date: neoadjuvant FOLFIRINOX x4 cycles, 1/15/18-3/6/18  Difficulty tolerating neoadjuvant intent chemotherapy. Distal pancreatectomy performed April 17, 2018, no residual carcinoma seen on operative pathology.  Treated with four cycles of adjuvant chemotherapy with gemcitabine and Xeloda, completed September 2018.     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     Referring physician: Dr. González Metz MD      Oncology History: She was previously healthy until she presented in early November 2017 with abdominal pain. CT abdomen on 11/1/17 showed acute pancreatitis (lipase 41,960) and a 3cm heterogenous pancreatic tail mass. The etiology of pancreatitis is unclear - no obstructing gallstone and not a heavy drinker. She was hospitalized for one week and followed up with Dr. González Metz in GI clinic.     11/29/17 -- endoscopic drainage of walled-off area of pancreatic necrosis by Dr. Metz. During the same procedure she had an EUS FNA of the pancreatic tail mass and pathology showed adenocarcinoma. The mass measured 33 x 27 mm, encapsulated with solid and cystic components, rim calcification, and no evidence of invasion.     12/9/17 -- Staging CT chest/abd/pelvis showed several small pulmonary nodules (largest 3mm), unchanged mass in the pancreatic tail, mildly prominent mesenteric nodes thought likely  reactive, and small right hepatic lobe hypodensities. Abdominal MRI on 12/10/17 showed hepatic hemangiomas, no evidence of metastatic disease to the liver.     1/8/18 - - oncology consultation, Dr. Monk. Recommendation: neoadjuvant intent chemotherapy with FOLFIRINOX.    1/15/18 - - cycle 1/day one FOLFIRINOX chemotherapy.    1/20 through 1/27/18 - - hospitalization at the [facility], for acute pancreatitis. Following three days of nausea, vomiting, pain. During this hospitalization: ERCP was attempted by Dr. Sanches with pancreatic stent placement, but pancreatic duct was completely obstructed and wire was unable to pass beyond the duct. Dr. Metz performed successful US guided cyst gastrostomy January 25, 2018.    1/31/18 - - oncology follow-up, DANTE Talavera. Neutropenic with ANC 0.4, thus defer cycle two of chemotherapy.    2/5/18 - - oncology follow-up, DANTE Junior.  Cycle 2/day one FOLFIRINOX chemotherapy. Patient endorses cold sensitivity secondary to oxaliplatin. Neulasta given for growth factor support. Due to significant neutropenia with cycle one.    2/11/18 - - ER evaluation for epigastric pain. Discharged to home from ER. Leukocytosis secondary to Neulasta given on February 8.    2/20/18 - - oncology follow-up, DANTE Junior. Cycle 3/day one FOLFIRINOX given without Neulasta. At patient request, oxaliplatin dose reduced by 10% due to neuropathy/cold sensitivity.    3/6/18 - - oncology follow-up, DANTE Talavera. Cycle 4/day one FOLFIRINOX chemotherapy.    3/13/18 - - CT chest/abdomen/pelvis - - IMPRESSION: 1. Minimal decrease in size and marked decrease in enhancement and pancreatic mass since the comparison study. 2. No significant change in low dense lesions in the liver since comparison.    3/16/18 - - oncology follow-up Dr. Monk. Plan is to hold on further chemotherapy as surgery is scheduled for 4/17/18 4/17/18 - - surgery: PROCEDURE: Diagnostic  laparoscopy, splenic flexure mobilization, intraoperative ultrasound, distal pancreatectomy, splenectomy, and cholecystectomy. SURGEON: Ja Byrd MD  Final surgical pathology showed necrotizing pancreatitis, no evidence of residual cancer, complete pathologic response, 26 benign lymph nodes.  FINAL DIAGNOSIS: A. DISTAL PANCREAS, SPLEEN AND OMENTUM, RESECTION: - Necrotizing pancreatitis with residual acellular mucin and foci of high grade dysplasia, status post neoadjuvant therapy   - Treatment response: complete (score 0)   - Twenty-six benign lymph nodes (0/26)   - Negative for residual carcinoma   - Pathologic staging: [id number]   - Focal infarction, metaplastic bone formation - Surgical margins are free of neoplasia - Unremarkable spleen and omentum   B. GALLBLADDER, CHOLECYSTECTOMY: - Benign gallbladder, biliary mucosa with no significant histologic abnormality - Negative for dysplasia COMMENT: Histologic sections demonstrate pools of acellular mucin, necrosis and scattered high grade dysplasia (PanIN-3) with no evidence of residual invasive carcinoma    4/30/18 - - surgical oncology follow-up, Dr. Byrd.  5/4/18 - - palliative care follow-up Dr. Snow. No specific recommendations required at this time.  5/14/18 - - oncology follow-up, Dr. Monk. Plan: adjuvant chemotherapy with gemcitabine and Xeloda.  5/30/18 - - cycle 1/day one adjuvant chemotherapy with gemcitabine and Xeloda.  Cycle three was dose reduced due to mucositis secondary to Xeloda.  8/28/18 - - oncology follow-up, DANTE Talavera. Cycle 4/day one gemcitabine and Xeloda.  9/17/18 - - CT chest/abdomen/pelvis - -IMPRESSION:  Resolving postoperative changes. No new findings or evidence of metastatic disease.  9/24/18 - - oncology follow-up, Dr. Monk. PLAN: initiate active surveillance.  12/21/18--CT c/a/p--IMPRESSION:  1. Stable tiny 2 to 3 mm lung nodules as described above. This  suggests a benign etiology.  2. Two low density liver  lesions. These appear to vary in appearance  depending on slight variations in timing of the bolus of contrast.  Likely no significant change. Recommend continued close surveillance.  3. No evidence of recurrent mass in the abdomen or pelvis. No  adenopathy.  18--scheduled oncology follow-up, Dr Monk. PATIENT NO-SHOW.  18--oncology follow-up, Dr Monk.  Continue surveillance.  Increase Creon dose due to fatty food intolerance/weight loss.     Interim History:    Feeling on the mend today from fever .  Fever was up to 101F.  Had several bouts of diarrhea day prior to appointment, but this has resolved.  Only took tylenol for fever.  No cough or SOB.  Several family members with flu recently.  She received flu shot early in the season and tolerated well.  Besides recent illness, she feels she is getting better overall.  Pain in minimal and intermittent/short lasting at most.  Mainly R sided abd pain, often associated with eating and causes nausea without vomiting.  Notes she also develops discomfort in RUQ when eating fatty foods.  Is taking Creon 3 tabs with meals, 1-2 tabs with snacks.  Creon is constipating for her, while her metformin causes diarrhea.  The combination of the two generally leads to relatively normal stools for her with occasional diarrhea.  Lost about 10 pounds since the last visit unintentionally.  Eating OK, but again avoiding fatty/rich foods.  Able to exercise 1-2 times daily with good tolerance.  Currently on sliding scale insulin, previously worked as . Denies neuropathy symptoms, but notes she is cold most of the time.     Review Of Systems:  Full 10-point ROS reviewed. Pertinent symptoms reviewed above per HPI.    PAST MEDICAL HISTORY:   Pre-diabetes  Pancreatitis as above     PAST SURGICAL HISTORY:  Laparotomy x2 for ruptured tubal pregnancies    Discectomy for herniated lumbar disk  Breast reduction surgery     FAMILY HISTORY:  Colon cancer in maternal  "aunt  Paternal aunt and cousins with breast cancer  CAD in father and brother     SH: , from [place], with 29 yr-old son. Works as . former smoker, quit 30 years ago. two glasses of wine per night, none since pancreatitis diagnosis     Allergies: none      Current Outpatient Medications:      acetaminophen (TYLENOL) 500 MG tablet, Take 2 tablets (1,000 mg) by mouth every 8 hours, Disp: 100 tablet, Rfl: 1     amylase-lipase-protease (CREON) 41154-76858 units CPEP per EC capsule, Take 2-3 with meals / 1-2 with snacks, up to 15 per day., Disp: 450 capsule, Rfl: 6     BD INSULIN SYRINGE ULTRAFINE 31G X 5/16\" 0.3 ML, , Disp: , Rfl:      blood glucose monitoring (ONETOUCH VERIO IQ) test strip, Use to test blood sugar 4 times daily or as directed., Disp: 400 each, Rfl: 3     metFORMIN (GLUCOPHAGE-XR) 500 MG 24 hr tablet, Take 2 at breakfast and 1 at dinner, Disp: 270 tablet, Rfl: 3     ONETOUCH DELICA LANCETS 33G MISC, 4 lancets daily, Disp: 100 each, Rfl: 3     pantoprazole (PROTONIX) 40 MG EC tablet, Take 1 tablet (40 mg) by mouth every morning, Disp: 30 tablet, Rfl: 0  No current facility-administered medications for this visit.     Facility-Administered Medications Ordered in Other Visits:      heparin 100 UNIT/ML injection 5 mL, 5 mL, Intracatheter, Once, Art Guevara MD      Physical Exam:    There were no vitals taken for this visit.  KPS 90  GENERAL:  NAD, pleasant.  Appears stated age  HEENT:  Anicteric sclerae.  No evidence of mucositis or thrush. No oral ulcers, MMM.   LYMPHATICS:  No cervical/supraclavicular LAD appreciated.   CARDIOVASCULAR:  Regular rate and rhythm.  No S3, murmurs, gallops or rubs.   LUNGS:  CTAB, normal WOB on RA   ABDOMEN:  Well-healed midline surgical scar without fluctuance or tenderness.  No masses appreciated. No HSM. Normal bowel sounds, non-tender to deep palpation, no guarding/rebound.    EXTREMITIES:  No lower extremity edema. " [id number]    Laboratory/Imaging Studies    Results for orders placed or performed during the hospital encounter of 12/21/18   CT Chest/Abdomen/Pelvis w Contrast    Narrative    CT CHEST/ABDOMEN/PELVIS WITH CONTRAST 12/21/2018 9:56 AM     HISTORY: Malignant neoplasm of body of pancreas (H).    TECHNIQUE: 68 mL Isovue 370. Radiation dose for this scan was reduced  using automated exposure control, adjustment of the mA and/or kV  according to patient size, or iterative reconstruction technique.    COMPARISON: Chest, abdomen and pelvis CT from 9/17/2018.    FINDINGS:     Chest CT: No mediastinal or hilar adenopathy. Tiny nodule right upper  lobe axial series 3 image 24, unchanged measuring approximately 2 mm.  2 mm nodule right upper lobe axial series 3 image 25. This was also  present on the prior study. 0.3 mm nodule, axial series 3 image 43,  likely representing an intrafissural nodule in the major fissure also  unchanged. Previously seen tiny nodule in the lingula is not  definitely visualized now and may have been a blood vessel on end. No  new nodules or nodular growth. No pleural fluid. MediPort type  catheter in place.    Abdomen/pelvis CT: There is a 1 cm low-density lesion in the right  lobe of the liver, axial series 2 image 64. This is slightly more  defined but smaller than on the prior study likely related to the  phase of contrast injection. A second subcentimeter low-density lesion  in the more-superior right lobe of the liver again is slightly more  well-defined likely due to the phase of contrast. It is likely similar  in size. No significant change. Recommend continued close  surveillance.    The spleen is absent. Body and tail of the pancreas is absent. Head  appears normal. No adrenal lesions. Kidneys are normal. No  retroperitoneal adenopathy or evidence of aortic aneurysm.    Scans through the pelvis demonstrate a decompressed bladder. Large  amount of stool in the colon may be related to  constipation. No  evidence of bowel obstruction. No free air or free fluid. No  adenopathy.    No skeletal lesions.      Impression    IMPRESSION:  1. Stable tiny 2 to 3 mm lung nodules as described above. This  suggests a benign etiology.  2. Two low density liver lesions. These appear to vary in appearance  depending on slight variations in timing of the bolus of contrast.  Likely no significant change. Recommend continued close surveillance.  3. No evidence of recurrent mass in the abdomen or pelvis. No  adenopathy.    CHRISTINE GROSSMAN MD     ASSESSMENT/PLAN:  Mrs. Bangura is a 57-year-old woman with resected pancreatic tail adenocarcinoma.  She had initial extensive and severe necrotizing pancreatitis upon initial diagnosis and hospitalization that delayed treatment.  We ultimately were able to begin neoadjuvant FOLFIRINOX for 4 cycles beginning in January.  By the time she had pancreatectomy by Dr. Byrd in mid April there was no evidence of residual carcinoma seen on operative pathology.  She completed subsequently 4 months of gemcitabine and Xeloda in the adjuvant setting.   She presents today for follow-up.  CT chest/abd/pelvis notes stable pulmonary nodules, stable hepatic hypodense lesions, and no evidence of disease in abdomen or pelvis. She is otherwise recovering.  Main complaint today is fatty/rich food intolerance, which we discussed may be due to inadequate creon dosing. She reports taking creon appropriately with meals and snacks.  Discussed that she should increase Creon dose by 1 capsule for each meal/snack for at least a week and see how this helps.  If not adequate response, then could consider addition of a second tab.  She and her  are in agreement with this plan and denied additional questions regarding the plan. Plan for follow-up labs and surveillance imaging in 3 months, which was ordered today.      -Follow-up with Dr. Monk in clinic in 3 months with CT CAP w/ contrast and labs prior.    -Increase creon by 1 tablet for each meal/snack x1 week.  If no improvement in bloating symptoms, can increase by 1 additional tab.    -Follow-up with GI as previously scheduled for management of diabetes.      Patient seen and discussed with Dr. Monk.      Claude Bonilla MD, PhD  Hematology/Oncology Fellow  Pager: [phone]    MEDICAL ONCOLOGY ATTENDING PHYSICIAN ADDENDUM:  I have seen and evaluated this patient with the medical oncology fellow. I have reviewed and edited this fellow's note, and agree with the assessment and plan stated above. A complete review of systems was assessed, and pertinent positives/negatives are discussed in detail above and as follows.    Mrs. Bangura was scheduled to meet with me 3 days ago but had a flu-like illness that kept her at home.  She is feeling slightly better but still has some malaise.  She does not have any current fevers.  She has a scheduled appointment with the gastroenterologist next month to follow up on these issues she has been having regarding constipation on and off for over 4-5 weeks.  We confirmed that she has been taking 3 capsules of Creon with each meal and 1 with a snack.  I encouraged her to take 4 capsules of Creon with each meal and up to 2 with each snack for the next 7-10 days and titrate upward further as indicated.      She did resume pantoprazole on the recommendation of her physician after colonoscopy done 12/11 and this has helped somewhat as well.  Colonoscopy was quite unremarkable.  Recommendation on further colon cancer screening was she did not need to repeat another one for 10 years barring any new lower GI symptoms.      She will follow up with her gastroenterologist and will follow up with Dr. Gallo from our Endocrinology team regarding her diabetes which is under relatively good control with metformin.  She asked whether or not she could have a Pneumovax vaccine in the context of having had a splenectomy.  I think it would be perfectly  appropriate.  She can do this through her primary care physician.      In terms of further followup for her pancreas cancer, we reviewed a CT chest, abdomen and pelvis and labs.  I printed out all the above for her today, reviewed them in full and provided her a printed copy by the end of the visit.  I reviewed her scans personally.  The liver cysts that are homogeneous and hypodense are consistent over the last year and I reviewed scans going back for at least the last 8 months to confirm that they are unchanged in size and are very unlikely to represent any form of metastatic pancreatic cancer.  She will follow up with continued surveillance.  We will get a CT of the chest, abdomen and pelvis, CA 19-9, CBC and CMP in 3 months' time.  She will meet with me within a week to review the results at that time.     I spent 30 minutes in consultation, including H&P and Discussion. >50% of time was spent in counseling and in coordination of care.    Jovany Monk MD, PhD

## 2018-12-31 NOTE — NURSING NOTE
"Oncology Rooming Note    December 31, 2018 7:34 AM   Kitty Bangura is a 58 year old female who presents for:    Chief Complaint   Patient presents with     Oncology Clinic Visit     [facility]- PAN CA     Initial Vitals: BP 96/68 (BP Location: Right arm, Patient Position: Chair, Cuff Size: Adult Regular)   Pulse 80   Temp 97  F (36.1  C) (Tympanic)   Resp 14   Ht 1.651 m (5' 5\")   Wt 63.7 kg (140 lb 8 oz)   SpO2 98%   BMI 23.38 kg/m   Estimated body mass index is 23.38 kg/m  as calculated from the following:    Height as of this encounter: 1.651 m (5' 5\").    Weight as of this encounter: 63.7 kg (140 lb 8 oz). Body surface area is 1.71 meters squared.  No Pain (0) Comment: Data Unavailable   No LMP recorded. Patient is postmenopausal.  Allergies reviewed: Yes  Medications reviewed: Yes    Medications: Medication refills not needed today.  Pharmacy name entered into [facility]:    [id number] - [place], [place] - [address] [facility] - [place], [place] - [address] [facility]    Clinical concerns: No new concerns. Aleshia was notified.    10 minutes for nursing intake (face to face time)     Imer Adams LPN            "

## 2019-01-14 ENCOUNTER — OFFICE VISIT (OUTPATIENT)
Dept: ENDOCRINOLOGY | Facility: CLINIC | Age: 59
End: 2019-01-14
Payer: COMMERCIAL

## 2019-01-14 VITALS
DIASTOLIC BLOOD PRESSURE: 68 MMHG | SYSTOLIC BLOOD PRESSURE: 102 MMHG | BODY MASS INDEX: 23.59 KG/M2 | HEIGHT: 65 IN | WEIGHT: 141.6 LBS | HEART RATE: 66 BPM

## 2019-01-14 DIAGNOSIS — Z79.4 TYPE 2 DIABETES MELLITUS WITH HYPERGLYCEMIA, WITH LONG-TERM CURRENT USE OF INSULIN (H): Primary | ICD-10-CM

## 2019-01-14 DIAGNOSIS — E11.65 TYPE 2 DIABETES MELLITUS WITH HYPERGLYCEMIA, WITH LONG-TERM CURRENT USE OF INSULIN (H): Primary | ICD-10-CM

## 2019-01-14 LAB — HBA1C MFR BLD: 6.7 % (ref 4.3–6)

## 2019-01-14 ASSESSMENT — PAIN SCALES - GENERAL: PAINLEVEL: NO PAIN (0)

## 2019-01-14 ASSESSMENT — MIFFLIN-ST. JEOR: SCORE: 1213.17

## 2019-01-14 NOTE — PROGRESS NOTES
"Endocrinology and Diabetes Clinic    Subjective:   Kitty Bangura is a 58 year old woman here for follow up of type 2 diabetes mellitus and steroid induced hyperglycemia.     She has been doing well. There has been no recurrence of pancreatic cancer and she is feeling progressively better as she continues to recover. She is no longer receiving chemotherapy or steroid infusions. She has been enjoying her increased ability to exercise and is back to cooking her own meals.     At her last clinic visit in 10/2018 daily NPH was discontinued and metformin was started. She is now taking metformin 500 mg three times daily with meals. She checks blood glucose 4 times per day. Review of her record over the past 14 days showed an average of 124 before breakfast, 90 before lunch, 114 before supper, and 148 before bed. She exercises in the mornings and has sometimes felt shaky after exercise and before lunch, but has not had any instances of blood glucose <70.     Medications:   Current Outpatient Medications   Medication Sig Dispense Refill     acetaminophen (TYLENOL) 500 MG tablet Take 2 tablets (1,000 mg) by mouth every 8 hours 100 tablet 1     amylase-lipase-protease (CREON) 29891-14701 units CPEP per EC capsule Take 2-3 with meals / 1-2 with snacks, up to 15 per day. 450 capsule 6     BD INSULIN SYRINGE ULTRAFINE 31G X 5/16\" 0.3 ML        blood glucose monitoring (ONETOUCH VERIO IQ) test strip Use to test blood sugar 4 times daily or as directed. 400 each 3     metFORMIN (GLUCOPHAGE-XR) 500 MG 24 hr tablet Take 2 at breakfast and 1 at dinner 270 tablet 3     ONETOUCH DELICA LANCETS 33G MISC 4 lancets daily 100 each 3     pantoprazole (PROTONIX) 40 MG EC tablet Take 1 tablet (40 mg) by mouth every morning 30 tablet 0     Social History:  Social History     Tobacco Use     Smoking status: Former Smoker     Packs/day: 0.50     Years: 4.00     Pack years: 2.00     Types: Cigarettes     Start date: 1/1/1974     Last attempt to " "quit: 1981     Years since quittin.0     Smokeless tobacco: Never Used   Substance Use Topics     Alcohol use: No     Alcohol/week: 12.6 oz     Comment: Now quit since Oct 31.  Moderate drinker in past     Review of Systems:   GENERAL: She has been losing weight.   SKIN: Negative  HENT: Negative   EYE: Negative  HEART: Negative  RESPIRATORY: Negative   GI: Negative  : Bloating and queasiness have been bothersome. Creon was recently increased which seems to help. A course of Protonix was also helpful. She does not think the onset of symptoms correlates with starting metformin.   MSK: Negative  BLOOD/LYMPH: Negative  NEUROLOGIC: Negative   PSYCH: Negative    Physical Examination:  Blood pressure 102/68, pulse 66, height 1.651 m (5' 5\"), weight 64.2 kg (141 lb 9.6 oz), not currently breastfeeding.  Body mass index is 23.56 kg/m .    Wt Readings from Last 4 Encounters:   18 63.7 kg (140 lb 8 oz)   18 63.5 kg (140 lb)   10/09/18 68 kg (150 lb)   10/05/18 67.2 kg (148 lb 3.2 oz)     General: Well appearing woman in no distress.   Eyes: EOMI. Sclerae and conjunctivae are clear.    HENT: No thyromegaly or mass.    Lymphatic: No cervical lymphadenopathy.  Cardiovascular: RRR, with normal S1+S2 and no murmurs.   Respiratory: Lungs are clear to auscultation.   Gastrointestinal: Abdomen is soft, non tender, and non-distended. Well healed surgical scars.   Extremities: No peripheral edema. Feet are warm and well perfused. No lesions or ulcers.   Neurologic: Sensation to light touch/monofilament is decreased in the feet bilaterally.     Labs and Studies:   Component      Latest Ref Rng & Units 2019   Hemoglobin A1C      4.3 - 6 % 6.7 (A)       Assessment:  1. Type 2 diabetes mellitus, which is well controlled.   2. Steroid induced hyperglycemia; no longer receiving IV dexamethasone.   3. History of pancreatic tail adenocarcinoma s/p resection, with no evidence for recurrence of disease.   4. History of " necrotizing pancreatitis during treatment for above.      Plan:   Conitnue metformin at current dose. If bothersome GI symptoms continue we can trial a decreased dose of metformin, to see if this is helpful. She may reduce blood sugar testing to once daily in the morning, plus extra testing if she feels ill or if she has any symptoms consistent with hypoglycemia. Try adding a light snack before or during exercise. Follow up in 6 months with me or with Latricia Bustillos.     Es Gallo MD   Diabetes and Endocrinology   [phone]

## 2019-01-14 NOTE — LETTER
"1/14/2019       RE: Kitty Bangura  [address] [facility] [id number]     Dear Colleague,    Thank you for referring your patient, Kitty Bangura, to the [facility] ENDOCRINOLOGY at [facility]. Please see a copy of my visit note below.    Endocrinology and Diabetes Clinic    Subjective:   Kitty Bangura is a 58 year old woman here for follow up of type 2 diabetes mellitus and steroid induced hyperglycemia.     She has been doing well. There has been no recurrence of pancreatic cancer and she is feeling progressively better as she continues to recover. She is no longer receiving chemotherapy or steroid infusions. She has been enjoying her increased ability to exercise and is back to cooking her own meals.     At her last clinic visit in 10/2018 daily NPH was discontinued and metformin was started. She is now taking metformin 500 mg three times daily with meals. She checks blood glucose 4 times per day. Review of her record over the past 14 days showed an average of 124 before breakfast, 90 before lunch, 114 before supper, and 148 before bed. She exercises in the mornings and has sometimes felt shaky after exercise and before lunch, but has not had any instances of blood glucose <70.     Medications:   Current Outpatient Medications   Medication Sig Dispense Refill     acetaminophen (TYLENOL) 500 MG tablet Take 2 tablets (1,000 mg) by mouth every 8 hours 100 tablet 1     amylase-lipase-protease (CREON) 62758-95245 units CPEP per EC capsule Take 2-3 with meals / 1-2 with snacks, up to 15 per day. 450 capsule 6     BD INSULIN SYRINGE ULTRAFINE 31G X 5/16\" 0.3 ML        blood glucose monitoring (ONETOUCH VERIO IQ) test strip Use to test blood sugar 4 times daily or as directed. 400 each 3     metFORMIN (GLUCOPHAGE-XR) 500 MG 24 hr tablet Take 2 at breakfast and 1 at dinner 270 tablet 3     ONETOUCH DELICA LANCETS 33G MISC 4 lancets daily 100 each 3     pantoprazole " "(PROTONIX) 40 MG EC tablet Take 1 tablet (40 mg) by mouth every morning 30 tablet 0     Social History:  Social History     Tobacco Use     Smoking status: Former Smoker     Packs/day: 0.50     Years: 4.00     Pack years: 2.00     Types: Cigarettes     Start date: 1974     Last attempt to quit: 1981     Years since quittin.0     Smokeless tobacco: Never Used   Substance Use Topics     Alcohol use: No     Alcohol/week: 12.6 oz     Comment: Now quit since Oct 31.  Moderate drinker in past     Review of Systems:   GENERAL: She has been losing weight.   SKIN: Negative  HENT: Negative   EYE: Negative  HEART: Negative  RESPIRATORY: Negative   GI: Negative  : Bloating and queasiness have been bothersome. Creon was recently increased which seems to help. A course of Protonix was also helpful. She does not think the onset of symptoms correlates with starting metformin.   MSK: Negative  BLOOD/LYMPH: Negative  NEUROLOGIC: Negative   PSYCH: Negative    Physical Examination:  Blood pressure 102/68, pulse 66, height 1.651 m (5' 5\"), weight 64.2 kg (141 lb 9.6 oz), not currently breastfeeding.  Body mass index is 23.56 kg/m .    Wt Readings from Last 4 Encounters:   18 63.7 kg (140 lb 8 oz)   18 63.5 kg (140 lb)   10/09/18 68 kg (150 lb)   10/05/18 67.2 kg (148 lb 3.2 oz)     General: Well appearing woman in no distress.   Eyes: EOMI. Sclerae and conjunctivae are clear.    HENT: No thyromegaly or mass.    Lymphatic: No cervical lymphadenopathy.  Cardiovascular: RRR, with normal S1+S2 and no murmurs.   Respiratory: Lungs are clear to auscultation.   Gastrointestinal: Abdomen is soft, non tender, and non-distended. Well healed surgical scars.   Extremities: No peripheral edema. Feet are warm and well perfused. No lesions or ulcers.   Neurologic: Sensation to light touch/monofilament is decreased in the feet bilaterally.     Labs and Studies:   Component      Latest Ref Rng & Units 2019   Hemoglobin A1C    "   4.3 - 6 % 6.7 (A)       Assessment:  1. Type 2 diabetes mellitus, which is well controlled.   2. Steroid induced hyperglycemia; no longer receiving IV dexamethasone.   3. History of pancreatic tail adenocarcinoma s/p resection, with no evidence for recurrence of disease.   4. History of necrotizing pancreatitis during treatment for above.      Plan:   Conitnue metformin at current dose. If bothersome GI symptoms continue we can trial a decreased dose of metformin, to see if this is helpful. She may reduce blood sugar testing to once daily in the morning, plus extra testing if she feels ill or if she has any symptoms consistent with hypoglycemia. Try adding a light snack before or during exercise. Follow up in 6 months with me or with Latricia Bustillos.       Es Gallo MD

## 2019-01-25 ENCOUNTER — INFUSION THERAPY VISIT (OUTPATIENT)
Dept: INFUSION THERAPY | Facility: CLINIC | Age: 59
End: 2019-01-25
Attending: INTERNAL MEDICINE
Payer: COMMERCIAL

## 2019-01-25 DIAGNOSIS — C25.9 PANCREATIC ADENOCARCINOMA (H): Primary | ICD-10-CM

## 2019-01-25 PROCEDURE — 96523 IRRIG DRUG DELIVERY DEVICE: CPT

## 2019-01-25 PROCEDURE — 25000128 H RX IP 250 OP 636: Performed by: INTERNAL MEDICINE

## 2019-01-25 RX ORDER — HEPARIN SODIUM (PORCINE) LOCK FLUSH IV SOLN 100 UNIT/ML 100 UNIT/ML
5 SOLUTION INTRAVENOUS
Status: DISCONTINUED | OUTPATIENT
Start: 2019-01-25 | End: 2019-01-25 | Stop reason: HOSPADM

## 2019-01-25 RX ADMIN — SODIUM CHLORIDE, PRESERVATIVE FREE 5 ML: 5 INJECTION INTRAVENOUS at 13:44

## 2019-01-25 NOTE — PROGRESS NOTES
Infusion Nursing Note:  Kitty De La Pazman presents today for port flush.    Patient seen by provider today: No   present during visit today: Not Applicable.    Note: N/A.    Intravenous Access:  Implanted Port.    Treatment Conditions:  Not Applicable.      Post Infusion Assessment:  Blood return noted.  Site patent and intact, free from redness, edema or discomfort.  No evidence of extravasations.  Access discontinued per protocol.    Discharge Plan:   Patient discharged in stable condition accompanied by: self.  Departure Mode: Ambulatory.    Key Garcia RN

## 2019-01-28 NOTE — PROGRESS NOTES
Infusion Nursing Note:  Kitty Bangura presents today for Day 8 Cycle 3 Gemzar/Xeloda.    Patient seen by provider today: No   present during visit today: Not Applicable.    Note: Patient states that she is feeling well overall. She states that her mouth sores have resolved. Endorses mild, intermittent nausea that is well-controlled with antiemetics. Endorses mild, intermittent constipation that is well-controlled with taking Miralax as needed.    Denies any recent fevers or chills. Denies dizziness/lightheadedness, shortness of breath, vomiting, diarrhea, neuropathy, or pain.    Patient confirms she is taking her Xeloda as prescribed and has an adequate supply.    Intravenous Access:  Implanted Port.    Treatment Conditions:  Lab Results   Component Value Date    HGB 11.8 08/06/2018     Lab Results   Component Value Date    WBC 6.3 08/06/2018      Lab Results   Component Value Date    ANEU 2.4 08/06/2018     Lab Results   Component Value Date     08/06/2018      Lab Results   Component Value Date     07/30/2018                   Lab Results   Component Value Date    POTASSIUM 4.2 07/30/2018           Lab Results   Component Value Date    MAG 2.3 07/16/2018            Lab Results   Component Value Date    CR 0.64 07/30/2018                   Lab Results   Component Value Date    ILIANA 8.7 07/30/2018                Lab Results   Component Value Date    BILITOTAL 0.3 07/30/2018           Lab Results   Component Value Date    ALBUMIN 3.2 07/30/2018                    Lab Results   Component Value Date    ALT 19 07/30/2018           Lab Results   Component Value Date    AST 23 07/30/2018       Results reviewed, labs MET treatment parameters, ok to proceed with treatment.      Post Infusion Assessment:  Patient tolerated infusion without incident.  Blood return noted pre and post infusion.  Site patent and intact, free from redness, edema or discomfort.  No evidence of extravasations.  Access  discontinued per protocol.    Discharge Plan:   Prescription refills given for Creon.  Discharge instructions reviewed with: Patient and Family.  Patient and/or family verbalized understanding of discharge instructions and all questions answered.  [facility] to [facility].  Patient will return 8/13/18 for next appointment.  Patient discharged in stable condition accompanied by: self and .  Departure Mode: Ambulatory.  Face to Face time: 0 minutes.    Mario Brown RN                         0 = swallows foods/liquids without difficulty

## 2019-02-22 ENCOUNTER — HOSPITAL ENCOUNTER (OUTPATIENT)
Dept: MAMMOGRAPHY | Facility: CLINIC | Age: 59
Discharge: HOME | End: 2019-02-22
Attending: FAMILY MEDICINE | Admitting: FAMILY MEDICINE
Payer: COMMERCIAL

## 2019-02-22 ENCOUNTER — INFUSION THERAPY VISIT (OUTPATIENT)
Dept: INFUSION THERAPY | Facility: CLINIC | Age: 59
End: 2019-02-22
Attending: INTERNAL MEDICINE
Payer: COMMERCIAL

## 2019-02-22 DIAGNOSIS — Z12.31 VISIT FOR SCREENING MAMMOGRAM: ICD-10-CM

## 2019-02-22 DIAGNOSIS — C25.9 PANCREATIC ADENOCARCINOMA (H): Primary | ICD-10-CM

## 2019-02-22 PROCEDURE — 96523 IRRIG DRUG DELIVERY DEVICE: CPT

## 2019-02-22 PROCEDURE — 77063 BREAST TOMOSYNTHESIS BI: CPT

## 2019-02-22 RX ORDER — HEPARIN SODIUM (PORCINE) LOCK FLUSH IV SOLN 100 UNIT/ML 100 UNIT/ML
SOLUTION INTRAVENOUS
Status: DISCONTINUED
Start: 2019-02-22 | End: 2019-02-22 | Stop reason: HOSPADM

## 2019-02-22 NOTE — PROGRESS NOTES
Infusion Nursing Note:  Kitty Bangura presents today for port flush.    Patient seen by provider today: No   present during visit today: Not Applicable.    Note: N/A.    Intravenous Access:  Implanted Port.    Treatment Conditions:  Not Applicable.      Post Infusion Assessment:  Patient tolerated procedure without incident.    Discharge Plan:   Patient discharged in stable condition accompanied by: self.    Rosalino Boss RN

## 2019-03-29 ENCOUNTER — INFUSION THERAPY VISIT (OUTPATIENT)
Dept: INFUSION THERAPY | Facility: CLINIC | Age: 59
End: 2019-03-29
Attending: INTERNAL MEDICINE
Payer: COMMERCIAL

## 2019-03-29 ENCOUNTER — HOSPITAL ENCOUNTER (OUTPATIENT)
Dept: CT IMAGING | Facility: CLINIC | Age: 59
Discharge: HOME | End: 2019-03-29
Attending: INTERNAL MEDICINE | Admitting: INTERNAL MEDICINE
Payer: COMMERCIAL

## 2019-03-29 DIAGNOSIS — C25.9 MALIGNANT NEOPLASM OF PANCREAS, UNSPECIFIED LOCATION OF MALIGNANCY (H): ICD-10-CM

## 2019-03-29 LAB
ALBUMIN SERPL-MCNC: 3.8 G/DL (ref 3.4–5)
ALP SERPL-CCNC: 105 U/L (ref 40–150)
ALT SERPL W P-5'-P-CCNC: 20 U/L (ref 0–50)
ANION GAP SERPL CALCULATED.3IONS-SCNC: 2 MMOL/L (ref 3–14)
AST SERPL W P-5'-P-CCNC: 19 U/L (ref 0–45)
BASOPHILS # BLD AUTO: 0.1 10E9/L (ref 0–0.2)
BASOPHILS NFR BLD AUTO: 0.8 %
BILIRUB SERPL-MCNC: 0.5 MG/DL (ref 0.2–1.3)
BUN SERPL-MCNC: 17 MG/DL (ref 7–30)
CALCIUM SERPL-MCNC: 8.8 MG/DL (ref 8.5–10.1)
CHLORIDE SERPL-SCNC: 106 MMOL/L (ref 94–109)
CO2 SERPL-SCNC: 32 MMOL/L (ref 20–32)
CREAT SERPL-MCNC: 0.56 MG/DL (ref 0.52–1.04)
DIFFERENTIAL METHOD BLD: NORMAL
EOSINOPHIL # BLD AUTO: 0.1 10E9/L (ref 0–0.7)
EOSINOPHIL NFR BLD AUTO: 1.5 %
ERYTHROCYTE [DISTWIDTH] IN BLOOD BY AUTOMATED COUNT: 14.2 % (ref 10–15)
GFR SERPL CREATININE-BSD FRML MDRD: >90 ML/MIN/{1.73_M2}
GLUCOSE SERPL-MCNC: 108 MG/DL (ref 70–99)
HCT VFR BLD AUTO: 40.2 % (ref 35–47)
HGB BLD-MCNC: 13.1 G/DL (ref 11.7–15.7)
IMM GRANULOCYTES # BLD: 0 10E9/L (ref 0–0.4)
IMM GRANULOCYTES NFR BLD: 0.3 %
LYMPHOCYTES # BLD AUTO: 4.1 10E9/L (ref 0.8–5.3)
LYMPHOCYTES NFR BLD AUTO: 43 %
MCH RBC QN AUTO: 31.2 PG (ref 26.5–33)
MCHC RBC AUTO-ENTMCNC: 32.6 G/DL (ref 31.5–36.5)
MCV RBC AUTO: 96 FL (ref 78–100)
MONOCYTES # BLD AUTO: 1.2 10E9/L (ref 0–1.3)
MONOCYTES NFR BLD AUTO: 12.2 %
NEUTROPHILS # BLD AUTO: 4 10E9/L (ref 1.6–8.3)
NEUTROPHILS NFR BLD AUTO: 42.2 %
NRBC # BLD AUTO: 0 10*3/UL
NRBC BLD AUTO-RTO: 0 /100
PLATELET # BLD AUTO: 395 10E9/L (ref 150–450)
POTASSIUM SERPL-SCNC: 4.2 MMOL/L (ref 3.4–5.3)
PROT SERPL-MCNC: 7.2 G/DL (ref 6.8–8.8)
RBC # BLD AUTO: 4.2 10E12/L (ref 3.8–5.2)
SODIUM SERPL-SCNC: 140 MMOL/L (ref 133–144)
WBC # BLD AUTO: 9.5 10E9/L (ref 4–11)

## 2019-03-29 PROCEDURE — 80053 COMPREHEN METABOLIC PANEL: CPT | Performed by: INTERNAL MEDICINE

## 2019-03-29 PROCEDURE — 86301 IMMUNOASSAY TUMOR CA 19-9: CPT | Performed by: INTERNAL MEDICINE

## 2019-03-29 PROCEDURE — 71260 CT THORAX DX C+: CPT

## 2019-03-29 PROCEDURE — 74177 CT ABD & PELVIS W/CONTRAST: CPT

## 2019-03-29 PROCEDURE — 36591 DRAW BLOOD OFF VENOUS DEVICE: CPT

## 2019-03-29 PROCEDURE — 25000128 H RX IP 250 OP 636: Performed by: INTERNAL MEDICINE

## 2019-03-29 PROCEDURE — 85025 COMPLETE CBC W/AUTO DIFF WBC: CPT | Performed by: INTERNAL MEDICINE

## 2019-03-29 PROCEDURE — 25000128 H RX IP 250 OP 636: Performed by: RADIOLOGY

## 2019-03-29 PROCEDURE — 25000125 ZZHC RX 250: Performed by: RADIOLOGY

## 2019-03-29 RX ORDER — IOPAMIDOL 755 MG/ML
69 INJECTION, SOLUTION INTRAVASCULAR ONCE
Status: COMPLETED | OUTPATIENT
Start: 2019-03-29 | End: 2019-03-29

## 2019-03-29 RX ORDER — HEPARIN SODIUM (PORCINE) LOCK FLUSH IV SOLN 100 UNIT/ML 100 UNIT/ML
500 SOLUTION INTRAVENOUS ONCE
Status: COMPLETED | OUTPATIENT
Start: 2019-03-29 | End: 2019-03-29

## 2019-03-29 RX ADMIN — SODIUM CHLORIDE 57 ML: 9 INJECTION, SOLUTION INTRAVENOUS at 09:37

## 2019-03-29 RX ADMIN — Medication 500 UNITS: at 09:47

## 2019-03-29 RX ADMIN — IOPAMIDOL 69 ML: 755 INJECTION, SOLUTION INTRAVENOUS at 09:37

## 2019-03-29 NOTE — PROGRESS NOTES
Infusion Nursing Note:  Kitty Bangura presents today for port access and labs.    Patient seen by provider today: No   present during visit today: Not Applicable.    Note: N/A.    Intravenous Access:  Labs drawn without difficulty.  Implanted Port.    Treatment Conditions:  Not Applicable.      Post Infusion Assessment:  NA.       Discharge Plan:   Pt will report to diagnostic clinic for CT this morning.  Departure Mode: Ambulatory.    Kathy Pa RN

## 2019-03-30 LAB — CANCER AG19-9 SERPL-ACNC: 2 U/ML (ref 0–37)

## 2019-04-04 NOTE — PROGRESS NOTES
Oncology Follow-up Visit:  Apr 5, 2019    Cancer diagnosis: cystic pancreatic tail adenocarcinoma  small subcentimeter pulmonary nodules seen on staging scans of unclear etiology.     Treatment to date: neoadjuvant FOLFIRINOX x4 cycles, 1/15/18-3/6/18  Difficulty tolerating neoadjuvant intent chemotherapy. Distal pancreatectomy performed April 17, 2018, no residual carcinoma seen on operative pathology.  Treated with four cycles of adjuvant chemotherapy with gemcitabine and Xeloda, completed September 2018.     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     Referring physician: Dr. González Metz MD      Oncology History: She was previously healthy until she presented in early November 2017 with abdominal pain. CT abdomen on 11/1/17 showed acute pancreatitis (lipase 41,960) and a 3cm heterogenous pancreatic tail mass. The etiology of pancreatitis is unclear - no obstructing gallstone and not a heavy drinker. She was hospitalized for one week and followed up with Dr. González Metz in GI clinic.     11/29/17 -- endoscopic drainage of walled-off area of pancreatic necrosis by Dr. Metz. During the same procedure she had an EUS FNA of the pancreatic tail mass and pathology showed adenocarcinoma. The mass measured 33 x 27 mm, encapsulated with solid and cystic components, rim calcification, and no evidence of invasion.     12/9/17 -- Staging CT chest/abd/pelvis showed several small pulmonary nodules (largest 3mm), unchanged mass in the pancreatic tail, mildly prominent mesenteric nodes thought likely reactive, and small right hepatic lobe hypodensities. Abdominal MRI on 12/10/17 showed hepatic hemangiomas, no evidence of metastatic disease to the liver.     1/8/18 - - oncology consultation, Dr. Monk. Recommendation: neoadjuvant intent  chemotherapy with FOLFIRINOX.    1/15/18 - - cycle 1/day one FOLFIRINOX chemotherapy.    1/20 through 1/27/18 - - hospitalization at the [facility], for acute pancreatitis. Following three days of nausea, vomiting, pain. During this hospitalization: ERCP was attempted by Dr. Sanches with pancreatic stent placement, but pancreatic duct was completely obstructed and wire was unable to pass beyond the duct. Dr. Metz performed successful US guided cyst gastrostomy January 25, 2018.    1/31/18 - - oncology follow-up, DANTE Talavera. Neutropenic with ANC 0.4, thus defer cycle two of chemotherapy.    2/5/18 - - oncology follow-up, DANTE Junior.  Cycle 2/day one FOLFIRINOX chemotherapy. Patient endorses cold sensitivity secondary to oxaliplatin. Neulasta given for growth factor support. Due to significant neutropenia with cycle one.    2/11/18 - - ER evaluation for epigastric pain. Discharged to home from ER. Leukocytosis secondary to Neulasta given on February 8.    2/20/18 - - oncology follow-up, DANTE Junior. Cycle 3/day one FOLFIRINOX given without Neulasta. At patient request, oxaliplatin dose reduced by 10% due to neuropathy/cold sensitivity.    3/6/18 - - oncology follow-up, DANTE Talavera. Cycle 4/day one FOLFIRINOX chemotherapy.    3/13/18 - - CT chest/abdomen/pelvis - - IMPRESSION: 1. Minimal decrease in size and marked decrease in enhancement and pancreatic mass since the comparison study. 2. No significant change in low dense lesions in the liver since comparison.    3/16/18 - - oncology follow-up Dr. Monk. Plan is to hold on further chemotherapy as surgery is scheduled for 4/17/18 4/17/18 - - surgery: PROCEDURE: Diagnostic laparoscopy, splenic flexure mobilization, intraoperative ultrasound, distal pancreatectomy, splenectomy, and cholecystectomy. SURGEON: Ja Byrd MD  Final surgical pathology showed necrotizing pancreatitis, no evidence of residual  cancer, complete pathologic response, 26 benign lymph nodes.  FINAL DIAGNOSIS: A. DISTAL PANCREAS, SPLEEN AND OMENTUM, RESECTION: - Necrotizing pancreatitis with residual acellular mucin and foci of high grade dysplasia, status post neoadjuvant therapy   - Treatment response: complete (score 0)   - Twenty-six benign lymph nodes (0/26)   - Negative for residual carcinoma   - Pathologic staging: [id number]   - Focal infarction, metaplastic bone formation - Surgical margins are free of neoplasia - Unremarkable spleen and omentum   B. GALLBLADDER, CHOLECYSTECTOMY: - Benign gallbladder, biliary mucosa with no significant histologic abnormality - Negative for dysplasia COMMENT: Histologic sections demonstrate pools of acellular mucin, necrosis and scattered high grade dysplasia (PanIN-3) with no evidence of residual invasive carcinoma    4/30/18 - - surgical oncology follow-up, Dr. Byrd.  5/4/18 - - palliative care follow-up Dr. Snow. No specific recommendations required at this time.  5/14/18 - - oncology follow-up, Dr. Monk. Plan: adjuvant chemotherapy with gemcitabine and Xeloda.  5/30/18 - - cycle 1/day one adjuvant chemotherapy with gemcitabine and Xeloda.  Cycle three was dose reduced due to mucositis secondary to Xeloda.  8/28/18 - - oncology follow-up, DANTE Talavera. Cycle 4/day one gemcitabine and Xeloda.  9/17/18 - - CT chest/abdomen/pelvis - -IMPRESSION:  Resolving postoperative changes. No new findings or evidence of metastatic disease.  9/24/18 - - oncology follow-up, Dr. Monk. PLAN: initiate active surveillance.  12/21/18--CT c/a/p--IMPRESSION:  1. Stable tiny 2 to 3 mm lung nodules as described above. This  suggests a benign etiology.  2. Two low density liver lesions. These appear to vary in appearance  depending on slight variations in timing of the bolus of contrast.  Likely no significant change. Recommend continued close surveillance.  3. No evidence of recurrent mass in the abdomen or pelvis.  No  adenopathy.  12/28/18--scheduled oncology follow-up, Dr Monk. PATIENT NO-SHOW.  12/31/18--oncology follow-up, Dr Monk. Plan: Continue surveillance.  Increase Creon dose due to fatty food intolerance/weight loss.    3/29/19--CT c/a/p-- IMPRESSION: Stable scan compared to 12/21/2018 with the following  findings:  1. Several bilateral small pulmonary nodules, unchanged in appearance.  2. Two right hepatic small hypodense lesions, possibly hemangiomas.  These are also stable in appearance.  3. No apparent recurrent mass.  4/5/19 - - oncology follow-up, Dr. Monk.         Interim History:  Ms. Bangura returns to the clinic today in the company of her .  She states her only symptomatic complaint at this point is she has some itching on the right side of her neck.  She presumes that it is secondary to her port.  She has declined to have her port removed in the past, as she had completed an adjuvant chemotherapy course for pancreatic cancer in 09/2018.  She felt she wanted to have the port in place for convenience for blood draws and other issues, but feels in more recent weeks it is becoming a problem with pruritus and itchiness, but without discoloration.  She has not seen a rheumatologist.  She has not seen her [facility] primary care provider in a very long time as well.  She did follow up with a CT chest, abdomen and pelvis on 03/29 that showed no evidence of recurrent or metastatic pancreatic carcinoma.  Her  remains unremarkable.  She denies any pain today.  She does state every once in a few days, she has a large amount of liquid bowel movements without hematochezia 2-3 in the morning.  She has been continuing to take Creon capsules approximately 4 with each meal and 2 with each set of snacks.  She also is following with Dr. Gallo due to her pancreatitis and pancreatic cancer-related diabetes, for which she is on metformin.                    Review Of Systems:  Full 10-point ROS reviewed.  "Pertinent symptoms reviewed above per HPI.    PAST MEDICAL HISTORY:   Pre-diabetes  Pancreatitis as above     PAST SURGICAL HISTORY:  Laparotomy x2 for ruptured tubal pregnancies    Discectomy for herniated lumbar disk  Breast reduction surgery     FAMILY HISTORY:  Colon cancer in maternal aunt  Paternal aunt and cousins with breast cancer  CAD in father and brother     SH: , from [place], with 29 yr-old son. Works as . former smoker, quit 30 years ago. two glasses of wine per night, none since pancreatitis diagnosis     Allergies: none      Current Outpatient Medications:      acetaminophen (TYLENOL) 500 MG tablet, Take 2 tablets (1,000 mg) by mouth every 8 hours, Disp: 100 tablet, Rfl: 1     amylase-lipase-protease (CREON) 20435-86081 units CPEP per EC capsule, Take 2-3 with meals / 1-2 with snacks, up to 15 per day., Disp: 450 capsule, Rfl: 6     BD INSULIN SYRINGE ULTRAFINE 31G X 5/16\" 0.3 ML, , Disp: , Rfl:      blood glucose monitoring (ONETOUCH VERIO IQ) test strip, Use to test blood sugar 4 times daily or as directed., Disp: 400 each, Rfl: 3     metFORMIN (GLUCOPHAGE-XR) 500 MG 24 hr tablet, Take 2 at breakfast and 1 at dinner, Disp: 270 tablet, Rfl: 3     ONETOUCH DELICA LANCETS 33G MISC, 4 lancets daily, Disp: 100 each, Rfl: 3     pantoprazole (PROTONIX) 40 MG EC tablet, Take 1 tablet (40 mg) by mouth every morning (Patient not taking: Reported on 2019), Disp: 30 tablet, Rfl: 0  No current facility-administered medications for this visit.     Facility-Administered Medications Ordered in Other Visits:      heparin 100 UNIT/ML injection 5 mL, 5 mL, Intracatheter, Once, Art Guevara MD      Physical Exam:  /63   Pulse 78   Temp 98.4  F (36.9  C) (Oral)   Resp 16   Ht 1.651 m (5' 5\")   Wt 64 kg (141 lb)   SpO2 97%   BMI 23.46 kg/m      KPS 90  PHYSICAL EXAMINATION:     GENERAL:  Well-appearing, older  woman in no acute distress, alert and " oriented x3.   HEENT:  Anicteric sclerae.  Oropharynx without mucositis or thrush.  No jaundice of the frenulum.     NECK:  There are no palpable nodes on palpation of the head and neck region.  There is no evidence of discoloration changes of the right neck where she states she was having pruritus, no papular erythematous lesions noted.   CARDIOVASCULAR:  Regular rate and rhythm, normal S1 and S2, no murmurs, gallops or rubs.   LUNGS:  Clear to auscultation throughout.   ABDOMEN:  Benign.  The midline surgical scar is well healed.  No fluctuance, erythema or tenderness.  No palpable masses, splenomegaly or ascites.   EXTREMITIES:  No evidence of lower extremity edema.           Laboratory/Imaging Studies  Results for EZEQUIEL CHAVIS (MRN [id number]) as of 4/4/2019 13:51   Ref. Range 3/13/2018 09:20 5/22/2018 09:15 9/4/2018 09:08 12/21/2018 09:05 3/29/2019 09:04   Cancer Antigen 19-9 Latest Ref Range: 0 - 37 U/mL 10 7 2 2 2     Results for orders placed or performed during the hospital encounter of 03/29/19   CT Chest/Abdomen/Pelvis w Contrast    Narrative    CT CHEST/ABDOMEN/PELVIS WITH CONTRAST 3/29/2019 9:54 AM     HISTORY:  Pancreatic cancer surveillance, postop April 2018. Malignant  neoplasm of pancreas, unspecified location of malignancy (H).    CONTRAST DOSE:  69 mL Isovue 370    Radiation dose for this scan was reduced using automated exposure  control, adjustment of the mA and/or kV according to patient size, or  iterative reconstruction technique.    COMPARISON: 12/21/2018    FINDINGS:  Chest: There are several scattered bilateral tiny pulmonary nodules,  all less than 0.3 cm. These are unchanged from 12/21/2018. No new  pulmonary nodules are demonstrated. The lungs are otherwise clear. No  pleural effusion or pneumothorax. The mediastinum and deandra appear  within normal limits.    Abdomen/pelvis: Cholecystectomy surgical clips are noted. There are  two small right hepatic hypodense lesions thought to  most likely  represent benign hemangiomas given enhancement pattern. These appear  relatively stable compared to 12/21/2018. The pancreatic body/tail and  spleen are presumed to be surgically absent. The kidneys and adrenal  glands appear within normal limits. No retroperitoneal adenopathy is  identified. The right colonic wall is prominent, presumably related to  decompression. No evidence of bowel obstruction. No free peritoneal  fluid or air. Pelvic contents appear within normal limits.      Impression    IMPRESSION: Stable scan compared to 12/21/2018 with the following  findings:  1. Several bilateral small pulmonary nodules, unchanged in appearance.  2. Two right hepatic small hypodense lesions, possibly hemangiomas.  These are also stable in appearance.  3. No apparent recurrent mass.    SACHIN REYES MD         ASSESSMENT/PLAN:  Mrs. Bangura is a 58 year old woman with resected pancreatic tail adenocarcinoma.  She had initial extensive and severe necrotizing pancreatitis upon initial diagnosis and hospitalization that delayed treatment.  We ultimately were able to begin neoadjuvant FOLFIRINOX for 4 cycles beginning in January.  By the time she had pancreatectomy by Dr. Byrd in mid April there was no evidence of residual carcinoma seen on operative pathology.  She completed subsequently 4 months of gemcitabine and Xeloda in the adjuvant setting.       She completed chemotherapy approximately 7 months ago.  She is doing well.  She has no objective evidence of recurrent pancreatic malignancy.  I reviewed the CT scan with her in depth and provided a printed copy by the end of the visit as well as a printed copy of the labs.      She is very much reassured to hear this.  Now that there has been some further passage of time without evidence of recurrent disease, she is more interested in having the port removed.  She feels strongly that the port is causing a pruritus and itching on the right side of her neck.  I  stated that if the port is removed and the itching resolves we can chalk it up to that issue.  If it does not otherwise get resolved with removal of the port, then she will take this up with her primary care provider in [facility].  She will call herself to [facility] to make a primary care provider appointment and she may need to see a dermatologist if that is the issue.  At the current time, the way she left it was she does not yet want to have the port taken out and will let us know if she wishes to have this done.  Otherwise, I look forward to seeing her back in 3 months with a CT chest, abdomen and pelvis and check a CA 19-9 in advance.               I spent 30 minutes in consultation, including H&P and Discussion. >50% of time was spent in counseling and in coordination of care.    Jovany Monk MD, PhD

## 2019-04-05 ENCOUNTER — ONCOLOGY VISIT (OUTPATIENT)
Dept: ONCOLOGY | Facility: CLINIC | Age: 59
End: 2019-04-05
Attending: INTERNAL MEDICINE
Payer: COMMERCIAL

## 2019-04-05 VITALS
SYSTOLIC BLOOD PRESSURE: 101 MMHG | TEMPERATURE: 98.4 F | DIASTOLIC BLOOD PRESSURE: 63 MMHG | HEIGHT: 65 IN | HEART RATE: 78 BPM | OXYGEN SATURATION: 97 % | BODY MASS INDEX: 23.49 KG/M2 | WEIGHT: 141 LBS | RESPIRATION RATE: 16 BRPM

## 2019-04-05 DIAGNOSIS — C25.0 MALIGNANT NEOPLASM OF HEAD OF PANCREAS (H): Primary | ICD-10-CM

## 2019-04-05 PROCEDURE — 99214 OFFICE O/P EST MOD 30 MIN: CPT | Mod: ZP | Performed by: INTERNAL MEDICINE

## 2019-04-05 PROCEDURE — G0463 HOSPITAL OUTPT CLINIC VISIT: HCPCS | Mod: ZF

## 2019-04-05 ASSESSMENT — MIFFLIN-ST. JEOR: SCORE: 1210.45

## 2019-04-05 ASSESSMENT — PAIN SCALES - GENERAL: PAINLEVEL: NO PAIN (0)

## 2019-04-05 NOTE — NURSING NOTE
"Oncology Rooming Note    April 5, 2019 10:11 AM   Kitty Bangura is a 58 year old female who presents for:    Chief Complaint   Patient presents with     Oncology Clinic Visit     Return Pancreatic Ca     Initial Vitals: /63   Pulse 78   Temp 98.4  F (36.9  C) (Oral)   Resp 16   Ht 1.651 m (5' 5\")   Wt 64 kg (141 lb)   SpO2 97%   BMI 23.46 kg/m   Estimated body mass index is 23.46 kg/m  as calculated from the following:    Height as of this encounter: 1.651 m (5' 5\").    Weight as of this encounter: 64 kg (141 lb). Body surface area is 1.71 meters squared.  No Pain (0) Comment: Data Unavailable   No LMP recorded. Patient is postmenopausal.  Allergies reviewed: Yes  Medications reviewed: Yes    Medications: Medication refills not needed today.  Pharmacy name entered into Ph[employer]:    [id number] - [place], [place] - [address] [facility] - [facility] [place] - [address]    Clinical concerns: Results; neck itching; hands and feet neuropathy; swollen gland on left side of neck with pain      Latricia Mathias, ALEK              "

## 2019-04-05 NOTE — LETTER
4/5/2019       RE: Kitty Bangura  [id number] [id number]     Dear Colleague,    Thank you for referring your patient, Kitty Bangura, to the [facility]. Please see a copy of my visit note below.    Oncology Follow-up Visit:  Apr 5, 2019    Cancer diagnosis: cystic pancreatic tail adenocarcinoma  small subcentimeter pulmonary nodules seen on staging scans of unclear etiology.     Treatment to date: neoadjuvant FOLFIRINOX x4 cycles, 1/15/18-3/6/18  Difficulty tolerating neoadjuvant intent chemotherapy. Distal pancreatectomy performed April 17, 2018, no residual carcinoma seen on operative pathology.  Treated with four cycles of adjuvant chemotherapy with gemcitabine and Xeloda, completed September 2018.     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     Referring physician: Dr. González Metz MD      Oncology History: She was previously healthy until she presented in early November 2017 with abdominal pain. CT abdomen on 11/1/17 showed acute pancreatitis (lipase 41,960) and a 3cm heterogenous pancreatic tail mass. The etiology of pancreatitis is unclear - no obstructing gallstone and not a heavy drinker. She was hospitalized for one week and followed up with Dr. González Metz in GI clinic.     11/29/17 -- endoscopic drainage of walled-off area of pancreatic necrosis by Dr. Metz. During the same procedure she had an EUS FNA of the pancreatic tail mass and pathology showed adenocarcinoma. The mass measured 33 x 27 mm, encapsulated with solid and cystic components, rim calcification, and no evidence of invasion.     12/9/17 -- Staging CT chest/abd/pelvis showed several small pulmonary nodules (largest 3mm), unchanged mass in the pancreatic tail, mildly prominent mesenteric nodes thought likely  reactive, and small right hepatic lobe hypodensities. Abdominal MRI on 12/10/17 showed hepatic hemangiomas, no evidence of metastatic disease to the liver.     1/8/18 - - oncology consultation, Dr. Monk. Recommendation: neoadjuvant intent chemotherapy with FOLFIRINOX.    1/15/18 - - cycle 1/day one FOLFIRINOX chemotherapy.    1/20 through 1/27/18 - - hospitalization at the [facility], for acute pancreatitis. Following three days of nausea, vomiting, pain. During this hospitalization: ERCP was attempted by Dr. Sanches with pancreatic stent placement, but pancreatic duct was completely obstructed and wire was unable to pass beyond the duct. Dr. Metz performed successful US guided cyst gastrostomy January 25, 2018.    1/31/18 - - oncology follow-up, DANTE Talavera. Neutropenic with ANC 0.4, thus defer cycle two of chemotherapy.    2/5/18 - - oncology follow-up, DANTE Junior.  Cycle 2/day one FOLFIRINOX chemotherapy. Patient endorses cold sensitivity secondary to oxaliplatin. Neulasta given for growth factor support. Due to significant neutropenia with cycle one.    2/11/18 - - ER evaluation for epigastric pain. Discharged to home from ER. Leukocytosis secondary to Neulasta given on February 8.    2/20/18 - - oncology follow-up, DANTE Junior. Cycle 3/day one FOLFIRINOX given without Neulasta. At patient request, oxaliplatin dose reduced by 10% due to neuropathy/cold sensitivity.    3/6/18 - - oncology follow-up, DANTE Talavera. Cycle 4/day one FOLFIRINOX chemotherapy.    3/13/18 - - CT chest/abdomen/pelvis - - IMPRESSION: 1. Minimal decrease in size and marked decrease in enhancement and pancreatic mass since the comparison study. 2. No significant change in low dense lesions in the liver since comparison.    3/16/18 - - oncology follow-up Dr. Monk. Plan is to hold on further chemotherapy as surgery is scheduled for 4/17/18 4/17/18 - - surgery: PROCEDURE: Diagnostic  laparoscopy, splenic flexure mobilization, intraoperative ultrasound, distal pancreatectomy, splenectomy, and cholecystectomy. SURGEON: Ja Byrd MD  Final surgical pathology showed necrotizing pancreatitis, no evidence of residual cancer, complete pathologic response, 26 benign lymph nodes.  FINAL DIAGNOSIS: A. DISTAL PANCREAS, SPLEEN AND OMENTUM, RESECTION: - Necrotizing pancreatitis with residual acellular mucin and foci of high grade dysplasia, status post neoadjuvant therapy   - Treatment response: complete (score 0)   - Twenty-six benign lymph nodes (0/26)   - Negative for residual carcinoma   - Pathologic staging: [id number]   - Focal infarction, metaplastic bone formation - Surgical margins are free of neoplasia - Unremarkable spleen and omentum   B. GALLBLADDER, CHOLECYSTECTOMY: - Benign gallbladder, biliary mucosa with no significant histologic abnormality - Negative for dysplasia COMMENT: Histologic sections demonstrate pools of acellular mucin, necrosis and scattered high grade dysplasia (PanIN-3) with no evidence of residual invasive carcinoma    4/30/18 - - surgical oncology follow-up, Dr. Byrd.  5/4/18 - - palliative care follow-up Dr. Snow. No specific recommendations required at this time.  5/14/18 - - oncology follow-up, Dr. Monk. Plan: adjuvant chemotherapy with gemcitabine and Xeloda.  5/30/18 - - cycle 1/day one adjuvant chemotherapy with gemcitabine and Xeloda.  Cycle three was dose reduced due to mucositis secondary to Xeloda.  8/28/18 - - oncology follow-up, DANTE Talavera. Cycle 4/day one gemcitabine and Xeloda.  9/17/18 - - CT chest/abdomen/pelvis - -IMPRESSION:  Resolving postoperative changes. No new findings or evidence of metastatic disease.  9/24/18 - - oncology follow-up, Dr. Monk. PLAN: initiate active surveillance.  12/21/18--CT c/a/p--IMPRESSION:  1. Stable tiny 2 to 3 mm lung nodules as described above. This  suggests a benign etiology.  2. Two low density liver  lesions. These appear to vary in appearance  depending on slight variations in timing of the bolus of contrast.  Likely no significant change. Recommend continued close surveillance.  3. No evidence of recurrent mass in the abdomen or pelvis. No  adenopathy.  12/28/18--scheduled oncology follow-up, Dr Monk. PATIENT NO-SHOW.  12/31/18--oncology follow-up, Dr Monk. Plan: Continue surveillance.  Increase Creon dose due to fatty food intolerance/weight loss.    3/29/19--CT c/a/p-- IMPRESSION: Stable scan compared to 12/21/2018 with the following  findings:  1. Several bilateral small pulmonary nodules, unchanged in appearance.  2. Two right hepatic small hypodense lesions, possibly hemangiomas.  These are also stable in appearance.  3. No apparent recurrent mass.  4/5/19 - - oncology follow-up, Dr. Monk.         Interim History:  Ms. Bangura returns to the clinic today in the company of her .  She states her only symptomatic complaint at this point is she has some itching on the right side of her neck.  She presumes that it is secondary to her port.  She has declined to have her port removed in the past, as she had completed an adjuvant chemotherapy course for pancreatic cancer in 09/2018.  She felt she wanted to have the port in place for convenience for blood draws and other issues, but feels in more recent weeks it is becoming a problem with pruritus and itchiness, but without discoloration.  She has not seen a rheumatologist.  She has not seen her [facility] primary care provider in a very long time as well.  She did follow up with a CT chest, abdomen and pelvis on 03/29 that showed no evidence of recurrent or metastatic pancreatic carcinoma.  Her  remains unremarkable.  She denies any pain today.  She does state every once in a few days, she has a large amount of liquid bowel movements without hematochezia 2-3 in the morning.  She has been continuing to take Creon capsules approximately 4 with each  "meal and 2 with each set of snacks.  She also is following with Dr. Gallo due to her pancreatitis and pancreatic cancer-related diabetes, for which she is on metformin.                    Review Of Systems:  Full 10-point ROS reviewed. Pertinent symptoms reviewed above per HPI.    PAST MEDICAL HISTORY:   Pre-diabetes  Pancreatitis as above     PAST SURGICAL HISTORY:  Laparotomy x2 for ruptured tubal pregnancies    Discectomy for herniated lumbar disk  Breast reduction surgery     FAMILY HISTORY:  Colon cancer in maternal aunt  Paternal aunt and cousins with breast cancer  CAD in father and brother     SH: , from [place], with 29 yr-old son. Works as [employer]. former smoker, quit 30 years ago. two glasses of wine per night, none since pancreatitis diagnosis     Allergies: none      Current Outpatient Medications:      acetaminophen (TYLENOL) 500 MG tablet, Take 2 tablets (1,000 mg) by mouth every 8 hours, Disp: 100 tablet, Rfl: 1     amylase-lipase-protease (CREON) 46803-99678 units CPEP per EC capsule, Take 2-3 with meals / 1-2 with snacks, up to 15 per day., Disp: 450 capsule, Rfl: 6     BD INSULIN SYRINGE ULTRAFINE 31G X 5/16\" 0.3 ML, , Disp: , Rfl:      blood glucose monitoring (ONETOUCH VERIO IQ) test strip, Use to test blood sugar 4 times daily or as directed., Disp: 400 each, Rfl: 3     metFORMIN (GLUCOPHAGE-XR) 500 MG 24 hr tablet, Take 2 at breakfast and 1 at dinner, Disp: 270 tablet, Rfl: 3     ONETOUCH DELICA LANCETS 33G MISC, 4 lancets daily, Disp: 100 each, Rfl: 3     pantoprazole (PROTONIX) 40 MG EC tablet, Take 1 tablet (40 mg) by mouth every morning (Patient not taking: Reported on 2019), Disp: 30 tablet, Rfl: 0  No current facility-administered medications for this visit.     Facility-Administered Medications Ordered in Other Visits:      heparin 100 UNIT/ML injection 5 mL, 5 mL, Intracatheter, Once, Art Guevara MD      Physical Exam:  /63   Pulse 78   " "Temp 98.4  F (36.9  C) (Oral)   Resp 16   Ht 1.651 m (5' 5\")   Wt 64 kg (141 lb)   SpO2 97%   BMI 23.46 kg/m       KPS 90  PHYSICAL EXAMINATION:     GENERAL:  Well-appearing, older  woman in no acute distress, alert and oriented x3.   HEENT:  Anicteric sclerae.  Oropharynx without mucositis or thrush.  No jaundice of the frenulum.     NECK:  There are no palpable nodes on palpation of the head and neck region.  There is no evidence of discoloration changes of the right neck where she states she was having pruritus, no papular erythematous lesions noted.   CARDIOVASCULAR:  Regular rate and rhythm, normal S1 and S2, no murmurs, gallops or rubs.   LUNGS:  Clear to auscultation throughout.   ABDOMEN:  Benign.  The midline surgical scar is well healed.  No fluctuance, erythema or tenderness.  No palpable masses, splenomegaly or ascites.   EXTREMITIES:  No evidence of lower extremity edema.           Laboratory/Imaging Studies  Results for EZEQUIEL CHAVIS (MRN [id number]) as of 4/4/2019 13:51   Ref. Range 3/13/2018 09:20 5/22/2018 09:15 9/4/2018 09:08 12/21/2018 09:05 3/29/2019 09:04   Cancer Antigen 19-9 Latest Ref Range: 0 - 37 U/mL 10 7 2 2 2     Results for orders placed or performed during the hospital encounter of 03/29/19   CT Chest/Abdomen/Pelvis w Contrast    Narrative    CT CHEST/ABDOMEN/PELVIS WITH CONTRAST 3/29/2019 9:54 AM     HISTORY:  Pancreatic cancer surveillance, postop April 2018. Malignant  neoplasm of pancreas, unspecified location of malignancy (H).    CONTRAST DOSE:  69 mL Isovue 370    Radiation dose for this scan was reduced using automated exposure  control, adjustment of the mA and/or kV according to patient size, or  iterative reconstruction technique.    COMPARISON: 12/21/2018    FINDINGS:  Chest: There are several scattered bilateral tiny pulmonary nodules,  all less than 0.3 cm. These are unchanged from 12/21/2018. No new  pulmonary nodules are demonstrated. The lungs are " otherwise clear. No  pleural effusion or pneumothorax. The mediastinum and deandra appear  within normal limits.    Abdomen/pelvis: Cholecystectomy surgical clips are noted. There are  two small right hepatic hypodense lesions thought to most likely  represent benign hemangiomas given enhancement pattern. These appear  relatively stable compared to 12/21/2018. The pancreatic body/tail and  spleen are presumed to be surgically absent. The kidneys and adrenal  glands appear within normal limits. No retroperitoneal adenopathy is  identified. The right colonic wall is prominent, presumably related to  decompression. No evidence of bowel obstruction. No free peritoneal  fluid or air. Pelvic contents appear within normal limits.      Impression    IMPRESSION: Stable scan compared to 12/21/2018 with the following  findings:  1. Several bilateral small pulmonary nodules, unchanged in appearance.  2. Two right hepatic small hypodense lesions, possibly hemangiomas.  These are also stable in appearance.  3. No apparent recurrent mass.    SACHIN REYES MD         ASSESSMENT/PLAN:  Mrs. Bangura is a 58 year old woman with resected pancreatic tail adenocarcinoma.  She had initial extensive and severe necrotizing pancreatitis upon initial diagnosis and hospitalization that delayed treatment.  We ultimately were able to begin neoadjuvant FOLFIRINOX for 4 cycles beginning in January.  By the time she had pancreatectomy by Dr. Byrd in mid April there was no evidence of residual carcinoma seen on operative pathology.  She completed subsequently 4 months of gemcitabine and Xeloda in the adjuvant setting.       She completed chemotherapy approximately 7 months ago.  She is doing well.  She has no objective evidence of recurrent pancreatic malignancy.  I reviewed the CT scan with her in depth and provided a printed copy by the end of the visit as well as a printed copy of the labs.      She is very much reassured to hear this.  Now that  there has been some further passage of time without evidence of recurrent disease, she is more interested in having the port removed.  She feels strongly that the port is causing a pruritus and itching on the right side of her neck.  I stated that if the port is removed and the itching resolves we can chalk it up to that issue.  If it does not otherwise get resolved with removal of the port, then she will take this up with her primary care provider in [facility].  She will call herself to [facility] to make a primary care provider appointment and she may need to see a dermatologist if that is the issue.  At the current time, the way she left it was she does not yet want to have the port taken out and will let us know if she wishes to have this done.  Otherwise, I look forward to seeing her back in 3 months with a CT chest, abdomen and pelvis and check a CA 19-9 in advance.               I spent 30 minutes in consultation, including H&P and Discussion. >50% of time was spent in counseling and in coordination of care.    Jovany Monk MD, PhD

## 2019-04-18 ENCOUNTER — MYC MEDICAL ADVICE (OUTPATIENT)
Dept: ONCOLOGY | Facility: CLINIC | Age: 59
End: 2019-04-18

## 2019-04-18 NOTE — TELEPHONE ENCOUNTER
"Per Dr. Monk,   \"She should go through her primary care physician for arthritis symptoms and evaluation.\"     Writer contacted patient with recommendations. Reports she agrees with plan and will contact PCP.     Maude Romero RN   [facility] Triage    "

## 2019-04-30 ENCOUNTER — OFFICE VISIT (OUTPATIENT)
Dept: FAMILY MEDICINE | Facility: CLINIC | Age: 59
End: 2019-04-30
Payer: COMMERCIAL

## 2019-04-30 VITALS
SYSTOLIC BLOOD PRESSURE: 98 MMHG | HEIGHT: 65 IN | WEIGHT: 141 LBS | TEMPERATURE: 96.5 F | RESPIRATION RATE: 14 BRPM | HEART RATE: 65 BPM | DIASTOLIC BLOOD PRESSURE: 60 MMHG | OXYGEN SATURATION: 98 % | BODY MASS INDEX: 23.49 KG/M2

## 2019-04-30 DIAGNOSIS — M79.642 PAIN IN BOTH HANDS: ICD-10-CM

## 2019-04-30 DIAGNOSIS — R22.9 LOCALIZED SUPERFICIAL SWELLING, MASS, OR LUMP: ICD-10-CM

## 2019-04-30 DIAGNOSIS — M79.641 PAIN IN BOTH HANDS: ICD-10-CM

## 2019-04-30 DIAGNOSIS — R07.0 THROAT PAIN: Primary | ICD-10-CM

## 2019-04-30 LAB — TSH SERPL DL<=0.005 MIU/L-ACNC: 1.48 MU/L (ref 0.4–4)

## 2019-04-30 PROCEDURE — 86431 RHEUMATOID FACTOR QUANT: CPT | Performed by: FAMILY MEDICINE

## 2019-04-30 PROCEDURE — 99214 OFFICE O/P EST MOD 30 MIN: CPT | Performed by: FAMILY MEDICINE

## 2019-04-30 PROCEDURE — 84443 ASSAY THYROID STIM HORMONE: CPT | Performed by: FAMILY MEDICINE

## 2019-04-30 PROCEDURE — 36415 COLL VENOUS BLD VENIPUNCTURE: CPT | Performed by: FAMILY MEDICINE

## 2019-04-30 RX ORDER — IBUPROFEN 200 MG
200 TABLET ORAL EVERY 4 HOURS PRN
COMMUNITY
End: 2019-10-07

## 2019-04-30 ASSESSMENT — MIFFLIN-ST. JEOR: SCORE: 1210.45

## 2019-04-30 NOTE — PROGRESS NOTES
SUBJECTIVE:   Kitty Bangura is a 58 year old female who presents to clinic today for the following health issues:      Concern - L side of throat soreness, whiteness  and swelling   Onset: 1.5 mos and increasing at time     Description:   Patient started with mouth sores when she was on chem, got better after completing chemo but now has had for about 1.5mos, the soreness and white spot is in the roof of the mouth l/side  and will swell at times     Intensity: moderate    Progression of Symptoms:  Worsening at times     Accompanying Signs & Symptoms:  Patient has had chemo     Previous history of similar problem:   None before chemo     Precipitating factors:   Worsened by: with swallowing,brushing teeth and the roof of her mouth      Alleviating factors:  Improved by: with nothing     Therapies Tried and outcome: none     Joint Pain L hand Pain some in the r    Onset: 1-2019    Description:   Location: both hands L is worse than the R   Character: Sharp, Dull ache and Burning    Intensity: moderate, severe    Progression of Symptoms: worse    Accompanying Signs & Symptoms:  Other symptoms: none    History:   Previous similar pain: no       Precipitating factors:   Trauma or overuse: no     Alleviating factors:  Improved by: heat and Ibuprofen    Therapies Tried and outcome: same and heat and ibuprofen has been working       Concern - lump R upper arm   Onset: 1 mo    Description:   Patient has a pea size lump in R upper arm is not painful unless putting pressure on it     Intensity: mild pain with touching     Progression of Symptoms:  same    Accompanying Signs & Symptoms:  None     Previous history of similar problem:   none    Precipitating factors:   Worsened by: with touching it     Alleviating factors:  Improved by: none     Therapies Tried and outcome: none     Additional history: as documented    Patient is a 58 yr old female here for throat pain , ongoing for a few months,swells sometimes and when it  does swell she has some discomfort in swallowing food and fluids.   Patient of note has a history of pancreatic cancer and this is in remission. She has undergone chemotherapy . She denies any swollen glands.  Patient also has had pain in both hands especially in the small joints, she experiences some stiffness in her hand joints.   Last she has a small lump in the left upper arm that has been there for many months , not painful and has not gotten bigger.    Reviewed  and updated as needed this visit by clinical staff  Tobacco  Allergies  Meds  Med Hx  Surg Hx  Fam Hx  Soc Hx        Reviewed and updated as needed this visit by Provider         Patient Active Problem List   Diagnosis     Acute pancreatitis     Leukocytosis     Fatty liver     Pancreatic mass     Pancreatitis     Necrotizing pancreatitis     Pancreatic adenocarcinoma (H)     Dehydration     Hypernatremia     Hypotension     Anemia     Thrombocytopenia (H)     Hyperglycemia     Diabetes mellitus, type 2 (H)     Past Surgical History:   Procedure Laterality Date     ABDOMEN SURGERY  1983    Ruptured tubal pregnancies, additional surgeries followed     BACK SURGERY  2004    L5, S1 discectomy     BREAST SURGERY  2003    Breast reduction     COLONOSCOPY  2008     COLONOSCOPY N/A 12/11/2018    Procedure: colonoscopy;  Surgeon: Lobito Marte MD;  Location: [facility]     ENDOSCOPIC RETROGRADE CHOLANGIOPANCREATOGRAM N/A 1/25/2018    Procedure: COMBINED ENDOSCOPIC RETROGRADE CHOLANGIOPANCREATOGRAPHY, PLACE TUBE/STENT;  Endoscopic retrograde cholangiopancreatogram with stent placement and cyst drainage bile ;  Surgeon: Vito Sanches MD;  Location: [facility]     ENDOSCOPIC ULTRASOUND LOWER GASTROINTESTIONAL TRACT (GI) N/A 1/25/2018    Procedure: ENDOSCOPIC ULTRASOUND LOWER GASTROINTESTIONAL TRACT (GI);  Endoscopic Ultrasound with guided Cyst-Gastrostomy;  Surgeon: González Metz MD;  Location: [facility]     ENDOSCOPIC ULTRASOUND, ESOPHAGOSCOPY,  GASTROSCOPY, DUODENOSCOPY (EGD), NECROSECTOMY N/A 2017    Procedure: ENDOSCOPIC ULTRASOUND, ESOPHAGOSCOPY, GASTROSCOPY, DUODENOSCOPY (EGD), NECROSECTOMY;  Esophagogastroduodenoscopy, with  Necrosectomy and stents replacement  ;  Surgeon: González Metz MD;  Location: [facility]     ENDOSCOPIC ULTRASOUND, ESOPHAGOSCOPY, GASTROSCOPY, DUODENOSCOPY (EGD), NECROSECTOMY N/A 2017    Procedure: ENDOSCOPIC ULTRASOUND, ESOPHAGOSCOPY, GASTROSCOPY, DUODENOSCOPY (EGD), NECROSECTOMY;  Esophagogastroduodenoscopy with Necrosectomy, Balloon Dilation, stent removal X3 and Cystgastrostomy stent Placement X2;  Surgeon: Guru Cecilia Staples MD;  Location: [facility]     ESOPHAGOSCOPY, GASTROSCOPY, DUODENOSCOPY (EGD), COMBINED N/A 2017    Procedure: COMBINED ENDOSCOPIC ULTRASOUND, ESOPHAGOSCOPY, GASTROSCOPY, DUODENOSCOPY (EGD), FINE NEEDLE ASPIRATE/BIOPSY;  Endoscopic Ultrasound with cystgastrostomy and fine needle aspiration ;  Surgeon: González Metz MD;  [facility]     ESOPHAGOSCOPY, GASTROSCOPY, DUODENOSCOPY (EGD), COMBINED N/A 2018    Procedure: COMBINED ESOPHAGOSCOPY, GASTROSCOPY, DUODENOSCOPY (EGD);  EGD;  Surgeon: González Metz MD;  Location:  GI     ESOPHAGOSCOPY, GASTROSCOPY, DUODENOSCOPY (EGD), COMBINED N/A 2018    Procedure: COMBINED ESOPHAGOSCOPY, GASTROSCOPY, DUODENOSCOPY (EGD), REMOVE FOREIGN BODY;;  Surgeon: González Metz MD;  Location:  GI     GYN SURGERY  1983    Multiple ectopic pregnancies, reconnect,       INSERT PORT VASCULAR ACCESS Right 2018    Procedure: INSERT PORT VASCULAR ACCESS;  Chest Port Placement - right;  Surgeon: Chanda Lawson PA-C;  [facility]     LAPAROSCOPY DIAGNOSTIC (GENERAL) N/A 2018    Procedure: LAPAROSCOPY DIAGNOSTIC (GENERAL);  Diagnostic Laparoscopy; Open Distal Pancreatectomy And Splenectomy, splenic flexure mobilization, cholecystectomy, intraoperative ultrasound;  Surgeon: Ja Byrd  MD;  [facility]     PANCREATECTOMY, SPLENECTOMY N/A 2018    Procedure: PANCREATECTOMY, SPLENECTOMY;;  Surgeon: Ja Byrd MD;  [facility]       Social History     Tobacco Use     Smoking status: Former Smoker     Packs/day: 0.50     Years: 4.00     Pack years: 2.00     Types: Cigarettes     Start date: 1974     Last attempt to quit: 1981     Years since quittin.3     Smokeless tobacco: Never Used   Substance Use Topics     Alcohol use: No     Alcohol/week: 12.6 oz     Comment: Now quit since Oct 31.  Moderate drinker in past     Family History   Problem Relation Age of Onset     Heart Disease Father      Hyperlipidemia Father      Heart Disease Brother      Diabetes Mother      Hypertension Mother      Obesity Mother      Diabetes Maternal Grandfather      Hypertension Maternal Grandfather      Obesity Maternal Grandfather      Diabetes Sister      Hypertension Sister      Depression Sister      Anxiety Disorder Sister      Obesity Sister      Hypertension Brother      Hyperlipidemia Brother      Breast Cancer Cousin      Breast Cancer Cousin      Breast Cancer Cousin      Colon Cancer Other      Other Cancer Other         Mesothelioma     Depression Sister      Anxiety Disorder Brother      Anxiety Disorder Son      Mental Illness Sister         Schizophrenia     Obesity Maternal Grandmother      Obesity Sister          Current Outpatient Medications   Medication Sig Dispense Refill     acetaminophen (TYLENOL) 500 MG tablet Take 2 tablets (1,000 mg) by mouth every 8 hours 100 tablet 1     amylase-lipase-protease (CREON) 95227-75020 units CPEP per EC capsule Take 2-3 with meals / 1-2 with snacks, up to 15 per day. 450 capsule 6     blood glucose monitoring (ONETOUCH VERIO IQ) test strip Use to test blood sugar 4 times daily or as directed. 400 each 3     ibuprofen (ADVIL/MOTRIN) 200 MG tablet Take 200 mg by mouth every 4 hours as needed for mild pain       metFORMIN (GLUCOPHAGE-XR) 500  "MG 24 hr tablet Take 2 at breakfast and 1 at dinner 270 tablet 3     ONETOUCH DELICA LANCETS 33G MISC 4 lancets daily 100 each 3     pantoprazole (PROTONIX) 40 MG EC tablet Take 1 tablet (40 mg) by mouth every morning (Patient not taking: Reported on 4/30/2019) 30 tablet 0     No Known Allergies  BP Readings from Last 3 Encounters:   04/30/19 98/60   04/05/19 101/63   01/14/19 102/68    Wt Readings from Last 3 Encounters:   04/30/19 64 kg (141 lb)   04/05/19 64 kg (141 lb)   01/14/19 64.2 kg (141 lb 9.6 oz)                  Labs reviewed in EPIC    ROS:  Constitutional, HEENT, cardiovascular, pulmonary, gi and gu systems are negative, except as otherwise noted.    OBJECTIVE:     BP 98/60   Pulse 65   Temp 96.5  F (35.8  C) (Tympanic)   Resp 14   Ht 1.651 m (5' 5\")   Wt 64 kg (141 lb)   SpO2 98%   BMI 23.46 kg/m    Body mass index is 23.46 kg/m .  GENERAL: healthy, alert and no distress  HENT: normal cephalic/atraumatic, ear canals and TM's normal, nose and mouth without ulcers or lesions, oropharynx clear, oral mucous membranes moist and tonsillar stone  NECK: no adenopathy, no asymmetry, masses, or scars and thyroid normal to palpation  RESP: lungs clear to auscultation - no rales, rhonchi or wheezes  CV: regular rate and rhythm, normal S1 S2, no S3 or S4, no murmur, click or rub, no peripheral edema and peripheral pulses strong  ABDOMEN: soft, nontender, no hepatosplenomegaly, no masses and bowel sounds normal  MS: no gross musculoskeletal defects noted, no edema  Skin - small lump on upper left arm moves with skin non tender,soft.    Diagnostic Test Results:  none     ASSESSMENT/PLAN:   1. Throat pain  Observed tonsillar stones . Will check TSH , will likely need an ENT referral   - TSH with free T4 reflex    2. Pain in both hands  Will check Rh factor. If negative this may  be osteo arthritis . I recommended hand therapy   - Rheumatoid factor      3. Localized superficial swelling, mass, or lump  Patient " was reassured ,this appears to be a small cyst     FUTURE APPOINTMENTS:       - Follow-up visit in one month or sooner as needed    Solange Mcgill MD  [facility]

## 2019-04-30 NOTE — PATIENT INSTRUCTIONS
Thank you for choosing [facility].  You may be receiving an email and/or telephone survey request from [facility] [employer] regarding your visit today.  Please take a few minutes to respond to the survey to let us know how we are doing.      If you have questions or concerns, please contact us via [employer]See or you can contact your care team at [phone].    Our Clinic hours are:  Monday 6:40 am  to 7:00 pm  Tuesday -Friday 6:40 am to 5:00 pm    The [place] outpatient lab hours are:  Monday - Friday 6:10 am to 4:45 pm  Saturdays 7:00 am to 11:00 am  Appointments are required, call [phone]    If you have clinical questions after hours or would like to schedule an appointment,  call the clinic at [phone].     Physical Therapy Daily Treatment     Visit Count:   [employer] EXCHANGE/[place] [id number]  ID#: [id number]  Grp#: [id number]  

## 2019-04-30 NOTE — LETTER
May 3, 2019      Kitty Bangura  [address] [facility] [address]        Dear ,    We are writing to inform you of your test results.    Your test result was within normal parameters.     Resulted Orders   TSH with free T4 reflex   Result Value Ref Range    TSH 1.48 0.40 - 4.00 mU/L   Rheumatoid factor   Result Value Ref Range    Rheumatoid Factor <20 <20 IU/mL       If you have any questions or concerns, please call the clinic at the number listed above.       Sincerely,        Solange Mcgill MD

## 2019-05-01 LAB — RHEUMATOID FACT SER NEPH-ACNC: <20 IU/ML (ref 0–20)

## 2019-06-30 ENCOUNTER — MYC REFILL (OUTPATIENT)
Dept: GASTROENTEROLOGY | Facility: CLINIC | Age: 59
End: 2019-06-30

## 2019-06-30 DIAGNOSIS — K85.91 NECROTIZING PANCREATITIS: ICD-10-CM

## 2019-07-01 ENCOUNTER — INFUSION THERAPY VISIT (OUTPATIENT)
Dept: INFUSION THERAPY | Facility: CLINIC | Age: 59
End: 2019-07-01
Attending: INTERNAL MEDICINE
Payer: COMMERCIAL

## 2019-07-01 ENCOUNTER — HOSPITAL ENCOUNTER (OUTPATIENT)
Dept: CT IMAGING | Facility: CLINIC | Age: 59
Discharge: HOME | End: 2019-07-01
Attending: INTERNAL MEDICINE | Admitting: INTERNAL MEDICINE
Payer: COMMERCIAL

## 2019-07-01 DIAGNOSIS — C25.0 MALIGNANT NEOPLASM OF HEAD OF PANCREAS (H): ICD-10-CM

## 2019-07-01 LAB
ALBUMIN SERPL-MCNC: 3.7 G/DL (ref 3.4–5)
ALP SERPL-CCNC: 92 U/L (ref 40–150)
ALT SERPL W P-5'-P-CCNC: 20 U/L (ref 0–50)
ANION GAP SERPL CALCULATED.3IONS-SCNC: 6 MMOL/L (ref 3–14)
AST SERPL W P-5'-P-CCNC: 20 U/L (ref 0–45)
BASOPHILS # BLD AUTO: 0.1 10E9/L (ref 0–0.2)
BASOPHILS NFR BLD AUTO: 0.9 %
BILIRUB SERPL-MCNC: 0.7 MG/DL (ref 0.2–1.3)
BUN SERPL-MCNC: 17 MG/DL (ref 7–30)
CALCIUM SERPL-MCNC: 9.3 MG/DL (ref 8.5–10.1)
CHLORIDE SERPL-SCNC: 107 MMOL/L (ref 94–109)
CO2 SERPL-SCNC: 28 MMOL/L (ref 20–32)
CREAT SERPL-MCNC: 0.57 MG/DL (ref 0.52–1.04)
DIFFERENTIAL METHOD BLD: NORMAL
EOSINOPHIL # BLD AUTO: 0.1 10E9/L (ref 0–0.7)
EOSINOPHIL NFR BLD AUTO: 1.6 %
ERYTHROCYTE [DISTWIDTH] IN BLOOD BY AUTOMATED COUNT: 13.1 % (ref 10–15)
GFR SERPL CREATININE-BSD FRML MDRD: >90 ML/MIN/{1.73_M2}
GLUCOSE SERPL-MCNC: 106 MG/DL (ref 70–99)
HCT VFR BLD AUTO: 40.2 % (ref 35–47)
HGB BLD-MCNC: 12.8 G/DL (ref 11.7–15.7)
IMM GRANULOCYTES # BLD: 0 10E9/L (ref 0–0.4)
IMM GRANULOCYTES NFR BLD: 0.1 %
LYMPHOCYTES # BLD AUTO: 3.9 10E9/L (ref 0.8–5.3)
LYMPHOCYTES NFR BLD AUTO: 43.5 %
MCH RBC QN AUTO: 30.3 PG (ref 26.5–33)
MCHC RBC AUTO-ENTMCNC: 31.8 G/DL (ref 31.5–36.5)
MCV RBC AUTO: 95 FL (ref 78–100)
MONOCYTES # BLD AUTO: 1 10E9/L (ref 0–1.3)
MONOCYTES NFR BLD AUTO: 11.5 %
NEUTROPHILS # BLD AUTO: 3.8 10E9/L (ref 1.6–8.3)
NEUTROPHILS NFR BLD AUTO: 42.4 %
NRBC # BLD AUTO: 0 10*3/UL
NRBC BLD AUTO-RTO: 0 /100
PLATELET # BLD AUTO: 379 10E9/L (ref 150–450)
POTASSIUM SERPL-SCNC: 4.3 MMOL/L (ref 3.4–5.3)
PROT SERPL-MCNC: 7.3 G/DL (ref 6.8–8.8)
RBC # BLD AUTO: 4.22 10E12/L (ref 3.8–5.2)
SODIUM SERPL-SCNC: 141 MMOL/L (ref 133–144)
WBC # BLD AUTO: 8.9 10E9/L (ref 4–11)

## 2019-07-01 PROCEDURE — 80053 COMPREHEN METABOLIC PANEL: CPT | Performed by: INTERNAL MEDICINE

## 2019-07-01 PROCEDURE — 74177 CT ABD & PELVIS W/CONTRAST: CPT

## 2019-07-01 PROCEDURE — 25000128 H RX IP 250 OP 636: Performed by: INTERNAL MEDICINE

## 2019-07-01 PROCEDURE — 85025 COMPLETE CBC W/AUTO DIFF WBC: CPT | Performed by: INTERNAL MEDICINE

## 2019-07-01 PROCEDURE — 71260 CT THORAX DX C+: CPT

## 2019-07-01 PROCEDURE — 36591 DRAW BLOOD OFF VENOUS DEVICE: CPT

## 2019-07-01 PROCEDURE — 25000128 H RX IP 250 OP 636: Performed by: RADIOLOGY

## 2019-07-01 PROCEDURE — 25000125 ZZHC RX 250: Performed by: RADIOLOGY

## 2019-07-01 PROCEDURE — 86301 IMMUNOASSAY TUMOR CA 19-9: CPT | Performed by: INTERNAL MEDICINE

## 2019-07-01 RX ORDER — HEPARIN SODIUM,PORCINE 10 UNIT/ML
5-10 VIAL (ML) INTRAVENOUS
Status: DISCONTINUED | OUTPATIENT
Start: 2019-07-01 | End: 2019-07-01 | Stop reason: HOSPADM

## 2019-07-01 RX ORDER — IOPAMIDOL 755 MG/ML
69 INJECTION, SOLUTION INTRAVASCULAR ONCE
Status: COMPLETED | OUTPATIENT
Start: 2019-07-01 | End: 2019-07-01

## 2019-07-01 RX ORDER — HEPARIN SODIUM (PORCINE) LOCK FLUSH IV SOLN 100 UNIT/ML 100 UNIT/ML
5 SOLUTION INTRAVENOUS
Status: DISCONTINUED | OUTPATIENT
Start: 2019-07-01 | End: 2019-07-01 | Stop reason: HOSPADM

## 2019-07-01 RX ORDER — HEPARIN SODIUM (PORCINE) LOCK FLUSH IV SOLN 100 UNIT/ML 100 UNIT/ML
500 SOLUTION INTRAVENOUS ONCE
Status: COMPLETED | OUTPATIENT
Start: 2019-07-01 | End: 2019-07-01

## 2019-07-01 RX ADMIN — IOPAMIDOL 69 ML: 755 INJECTION, SOLUTION INTRAVENOUS at 09:19

## 2019-07-01 RX ADMIN — Medication 500 UNITS: at 09:25

## 2019-07-01 RX ADMIN — Medication 5 ML: at 08:48

## 2019-07-01 RX ADMIN — SODIUM CHLORIDE 57 ML: 9 INJECTION, SOLUTION INTRAVENOUS at 09:19

## 2019-07-01 NOTE — PROGRESS NOTES
Accessed pt's port for lab draw and CT scan. Good blood return noted. Pt discharged to Diagnostics. Janine Hercules RN

## 2019-07-03 LAB — CANCER AG19-9 SERPL-ACNC: 2 U/ML (ref 0–37)

## 2019-07-08 ENCOUNTER — ONCOLOGY VISIT (OUTPATIENT)
Dept: ONCOLOGY | Facility: CLINIC | Age: 59
End: 2019-07-08
Attending: INTERNAL MEDICINE
Payer: COMMERCIAL

## 2019-07-08 ENCOUNTER — ANESTHESIA (OUTPATIENT)
Dept: SURGERY | Facility: AMBULATORY SURGERY CENTER | Age: 59
End: 2019-07-08

## 2019-07-08 ENCOUNTER — HOSPITAL ENCOUNTER (OUTPATIENT)
Facility: AMBULATORY SURGERY CENTER | Age: 59
End: 2019-07-08
Attending: PHYSICIAN ASSISTANT
Payer: COMMERCIAL

## 2019-07-08 ENCOUNTER — ANESTHESIA EVENT (OUTPATIENT)
Dept: SURGERY | Facility: AMBULATORY SURGERY CENTER | Age: 59
End: 2019-07-08

## 2019-07-08 ENCOUNTER — ANCILLARY PROCEDURE (OUTPATIENT)
Dept: RADIOLOGY | Facility: AMBULATORY SURGERY CENTER | Age: 59
End: 2019-07-08
Attending: INTERNAL MEDICINE
Payer: COMMERCIAL

## 2019-07-08 VITALS
HEIGHT: 65 IN | SYSTOLIC BLOOD PRESSURE: 91 MMHG | DIASTOLIC BLOOD PRESSURE: 64 MMHG | BODY MASS INDEX: 22.51 KG/M2 | WEIGHT: 135.12 LBS | OXYGEN SATURATION: 99 % | RESPIRATION RATE: 16 BRPM | TEMPERATURE: 97.5 F

## 2019-07-08 VITALS
DIASTOLIC BLOOD PRESSURE: 65 MMHG | RESPIRATION RATE: 16 BRPM | WEIGHT: 135.8 LBS | BODY MASS INDEX: 22.6 KG/M2 | TEMPERATURE: 97 F | SYSTOLIC BLOOD PRESSURE: 91 MMHG | HEART RATE: 65 BPM | OXYGEN SATURATION: 97 %

## 2019-07-08 DIAGNOSIS — C25.0 MALIGNANT NEOPLASM OF HEAD OF PANCREAS (H): ICD-10-CM

## 2019-07-08 DIAGNOSIS — C25.9 MALIGNANT NEOPLASM OF PANCREAS, UNSPECIFIED LOCATION OF MALIGNANCY (H): Primary | ICD-10-CM

## 2019-07-08 LAB
GLUCOSE BLDC GLUCOMTR-MCNC: 91 MG/DL (ref 70–99)
GLUCOSE BLDC GLUCOMTR-MCNC: 99 MG/DL (ref 70–99)
INR PPP: 1.01 (ref 0.86–1.14)

## 2019-07-08 PROCEDURE — G0463 HOSPITAL OUTPT CLINIC VISIT: HCPCS | Mod: ZF

## 2019-07-08 PROCEDURE — 99214 OFFICE O/P EST MOD 30 MIN: CPT | Mod: ZP | Performed by: INTERNAL MEDICINE

## 2019-07-08 RX ORDER — ONDANSETRON 2 MG/ML
INJECTION INTRAMUSCULAR; INTRAVENOUS PRN
Status: DISCONTINUED | OUTPATIENT
Start: 2019-07-08 | End: 2019-07-08

## 2019-07-08 RX ORDER — ACETAMINOPHEN 325 MG/1
975 TABLET ORAL ONCE
Status: COMPLETED | OUTPATIENT
Start: 2019-07-08 | End: 2019-07-08

## 2019-07-08 RX ORDER — PROPOFOL 10 MG/ML
INJECTION, EMULSION INTRAVENOUS CONTINUOUS PRN
Status: DISCONTINUED | OUTPATIENT
Start: 2019-07-08 | End: 2019-07-08

## 2019-07-08 RX ORDER — SODIUM CHLORIDE, SODIUM LACTATE, POTASSIUM CHLORIDE, CALCIUM CHLORIDE 600; 310; 30; 20 MG/100ML; MG/100ML; MG/100ML; MG/100ML
INJECTION, SOLUTION INTRAVENOUS CONTINUOUS
Status: DISCONTINUED | OUTPATIENT
Start: 2019-07-08 | End: 2019-07-09 | Stop reason: HOSPADM

## 2019-07-08 RX ORDER — SODIUM CHLORIDE 9 MG/ML
INJECTION, SOLUTION INTRAVENOUS CONTINUOUS
Status: DISCONTINUED | OUTPATIENT
Start: 2019-07-08 | End: 2019-07-09 | Stop reason: HOSPADM

## 2019-07-08 RX ORDER — PROPOFOL 10 MG/ML
INJECTION, EMULSION INTRAVENOUS PRN
Status: DISCONTINUED | OUTPATIENT
Start: 2019-07-08 | End: 2019-07-08

## 2019-07-08 RX ORDER — GABAPENTIN 300 MG/1
300 CAPSULE ORAL ONCE
Status: COMPLETED | OUTPATIENT
Start: 2019-07-08 | End: 2019-07-08

## 2019-07-08 RX ORDER — LIDOCAINE 40 MG/G
CREAM TOPICAL
Status: DISCONTINUED | OUTPATIENT
Start: 2019-07-08 | End: 2019-07-09 | Stop reason: HOSPADM

## 2019-07-08 RX ADMIN — ONDANSETRON 4 MG: 2 INJECTION INTRAMUSCULAR; INTRAVENOUS at 10:56

## 2019-07-08 RX ADMIN — PROPOFOL 20 MG: 10 INJECTION, EMULSION INTRAVENOUS at 10:55

## 2019-07-08 RX ADMIN — SODIUM CHLORIDE, SODIUM LACTATE, POTASSIUM CHLORIDE, CALCIUM CHLORIDE: 600; 310; 30; 20 INJECTION, SOLUTION INTRAVENOUS at 09:50

## 2019-07-08 RX ADMIN — ACETAMINOPHEN 975 MG: 325 TABLET ORAL at 09:50

## 2019-07-08 RX ADMIN — GABAPENTIN 300 MG: 300 CAPSULE ORAL at 09:50

## 2019-07-08 RX ADMIN — PROPOFOL 100 MCG/KG/MIN: 10 INJECTION, EMULSION INTRAVENOUS at 10:55

## 2019-07-08 ASSESSMENT — MIFFLIN-ST. JEOR: SCORE: 1183.78

## 2019-07-08 ASSESSMENT — PAIN SCALES - GENERAL: PAINLEVEL: NO PAIN (0)

## 2019-07-08 NOTE — ANESTHESIA CARE TRANSFER NOTE
Patient: Kitty Bangura    Procedure(s):  Right Port Removal    Diagnosis: Malignant Neoplasm of Head of Pancreas  Diagnosis Additional Information: No value filed.    Anesthesia Type:   No value filed.     Note:  Airway :Room Air  Patient transferred to:Phase II  Handoff Report: Identifed the Patient, Identified the Reponsible Provider, Reviewed the pertinent medical history, Discussed the surgical course, Reviewed Intra-OP anesthesia mangement and issues during anesthesia, Set expectations for post-procedure period and Allowed opportunity for questions and acknowledgement of understanding      Vitals: (Last set prior to Anesthesia Care Transfer)    CRNA VITALS  7/8/2019 1054 - 7/8/2019 1128      7/8/2019             Pulse:  55    SpO2:  100 %                Electronically Signed By: TYLER Jurado CRNA  July 8, 2019  11:28 AM

## 2019-07-08 NOTE — NURSING NOTE
"Oncology Rooming Note    July 8, 2019 7:39 AM   Kitty Bangura is a 58 year old female who presents for:    Chief Complaint   Patient presents with     RECHECK     onc panc ca 3 mo f/up     Initial Vitals: BP 91/65 (BP Location: Right arm, Patient Position: Sitting, Cuff Size: Adult Regular)   Pulse 65   Temp 97  F (36.1  C) (Oral)   Resp 16   Wt 61.6 kg (135 lb 12.8 oz)   SpO2 97%   BMI 22.60 kg/m   Estimated body mass index is 22.6 kg/m  as calculated from the following:    Height as of 4/30/19: 1.651 m (5' 5\").    Weight as of this encounter: 61.6 kg (135 lb 12.8 oz). Body surface area is 1.68 meters squared.  No Pain (0) Comment: Data Unavailable   No LMP recorded. Patient is postmenopausal.  Allergies reviewed: Yes  Medications reviewed: Yes    Medications: Medication refills not needed today.  Pharmacy name entered into Waves[employer]:    [facility] - [place], [place] - [address] [facility] - [place], [place] - [address] [facility]    Clinical concerns: none        Tavia Venkat, ALEK              "

## 2019-07-08 NOTE — LETTER
7/8/2019       RE: Kitty Bangura  [address] [facility] [id number]     Dear Colleague,    Thank you for referring your patient, Kitty Bangura, to the [facility]. Please see a copy of my visit note below.    Oncology Follow-up Visit:  Jul 8, 2019    Cancer diagnosis: cystic pancreatic tail adenocarcinoma  small subcentimeter pulmonary nodules seen on staging scans of unclear etiology.     Treatment to date: neoadjuvant FOLFIRINOX x4 cycles, 1/15/18-3/6/18  Difficulty tolerating neoadjuvant intent chemotherapy. Distal pancreatectomy performed April 17, 2018, no residual carcinoma seen on operative pathology.  Treated with four cycles of adjuvant chemotherapy with gemcitabine and Xeloda, completed September 2018.     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     Referring physician: Dr. González Metz MD      Oncology History: She was previously healthy until she presented in early November 2017 with abdominal pain. CT abdomen on 11/1/17 showed acute pancreatitis (lipase 41,960) and a 3cm heterogenous pancreatic tail mass. The etiology of pancreatitis is unclear - no obstructing gallstone and not a heavy drinker. She was hospitalized for one week and followed up with Dr. González Mtez in GI clinic.     11/29/17 -- endoscopic drainage of walled-off area of pancreatic necrosis by Dr. Metz. During the same procedure she had an EUS FNA of the pancreatic tail mass and pathology showed adenocarcinoma. The mass measured 33 x 27 mm, encapsulated with solid and cystic components, rim calcification, and no evidence of invasion.     12/9/17 -- Staging CT chest/abd/pelvis showed several small pulmonary nodules (largest 3mm), unchanged mass in the pancreatic tail, mildly prominent mesenteric nodes thought likely  reactive, and small right hepatic lobe hypodensities. Abdominal MRI on 12/10/17 showed hepatic hemangiomas, no evidence of metastatic disease to the liver.     1/8/18 - - oncology consultation, Dr. Monk. Recommendation: neoadjuvant intent chemotherapy with FOLFIRINOX.    1/15/18 - - cycle 1/day one FOLFIRINOX chemotherapy.    1/20 through 1/27/18 - - hospitalization at the [facility], for acute pancreatitis. Following three days of nausea, vomiting, pain. During this hospitalization: ERCP was attempted by Dr. Sanches with pancreatic stent placement, but pancreatic duct was completely obstructed and wire was unable to pass beyond the duct. Dr. Metz performed successful US guided cyst gastrostomy January 25, 2018.    1/31/18 - - oncology follow-up, DANTE Talavera. Neutropenic with ANC 0.4, thus defer cycle two of chemotherapy.    2/5/18 - - oncology follow-up, DANTE Junior.  Cycle 2/day one FOLFIRINOX chemotherapy. Patient endorses cold sensitivity secondary to oxaliplatin. Neulasta given for growth factor support. Due to significant neutropenia with cycle one.    2/11/18 - - ER evaluation for epigastric pain. Discharged to home from ER. Leukocytosis secondary to Neulasta given on February 8.    2/20/18 - - oncology follow-up, DANTE Junior. Cycle 3/day one FOLFIRINOX given without Neulasta. At patient request, oxaliplatin dose reduced by 10% due to neuropathy/cold sensitivity.    3/6/18 - - oncology follow-up, DANTE Talavera. Cycle 4/day one FOLFIRINOX chemotherapy.    3/13/18 - - CT chest/abdomen/pelvis - - IMPRESSION: 1. Minimal decrease in size and marked decrease in enhancement and pancreatic mass since the comparison study. 2. No significant change in low dense lesions in the liver since comparison.    3/16/18 - - oncology follow-up Dr. Monk. Plan is to hold on further chemotherapy as surgery is scheduled for 4/17/18 4/17/18 - - surgery: PROCEDURE: Diagnostic  laparoscopy, splenic flexure mobilization, intraoperative ultrasound, distal pancreatectomy, splenectomy, and cholecystectomy. SURGEON: Ja Byrd MD  Final surgical pathology showed necrotizing pancreatitis, no evidence of residual cancer, complete pathologic response, 26 benign lymph nodes.  FINAL DIAGNOSIS: A. DISTAL PANCREAS, SPLEEN AND OMENTUM, RESECTION: - Necrotizing pancreatitis with residual acellular mucin and foci of high grade dysplasia, status post neoadjuvant therapy   - Treatment response: complete (score 0)   - Twenty-six benign lymph nodes (0/26)   - Negative for residual carcinoma   - Pathologic staging: [id number]   - Focal infarction, metaplastic bone formation - Surgical margins are free of neoplasia - Unremarkable spleen and omentum   B. GALLBLADDER, CHOLECYSTECTOMY: - Benign gallbladder, biliary mucosa with no significant histologic abnormality - Negative for dysplasia COMMENT: Histologic sections demonstrate pools of acellular mucin, necrosis and scattered high grade dysplasia (PanIN-3) with no evidence of residual invasive carcinoma    4/30/18 - - surgical oncology follow-up, Dr. Byrd.  5/4/18 - - palliative care follow-up Dr. Snow. No specific recommendations required at this time.  5/14/18 - - oncology follow-up, Dr. Monk. Plan: adjuvant chemotherapy with gemcitabine and Xeloda.  5/30/18 - - cycle 1/day one adjuvant chemotherapy with gemcitabine and Xeloda.  Cycle three was dose reduced due to mucositis secondary to Xeloda.  8/28/18 - - oncology follow-up, DANTE Talavera. Cycle 4/day one gemcitabine and Xeloda.  9/17/18 - - CT chest/abdomen/pelvis - -IMPRESSION:  Resolving postoperative changes. No new findings or evidence of metastatic disease.  9/24/18 - - oncology follow-up, Dr. Monk. PLAN: initiate active surveillance.  12/21/18--CT c/a/p--IMPRESSION:  1. Stable tiny 2 to 3 mm lung nodules as described above. This  suggests a benign etiology.  2. Two low density liver  lesions. These appear to vary in appearance  depending on slight variations in timing of the bolus of contrast.  Likely no significant change. Recommend continued close surveillance.  3. No evidence of recurrent mass in the abdomen or pelvis. No  adenopathy.  18--scheduled oncology follow-up, Dr Monk. PATIENT NO-SHOW.  18--oncology follow-up, Dr Monk. Plan: Continue surveillance.  Increase Creon dose due to fatty food intolerance/weight loss.    3/29/19--CT c/a/p-- IMPRESSION: Stable scan compared to 2018 with the following  findings:  1. Several bilateral small pulmonary nodules, unchanged in appearance.  2. Two right hepatic small hypodense lesions, possibly hemangiomas.  These are also stable in appearance.  3. No apparent recurrent mass.  19 - - oncology follow-up, Dr. Monk.     19--CT c/a/p--                                                                IMPRESSION:  Stable examination with no convincing metastatic or  recurrent neoplasm.  19--oncology follow-up, Dr. Monk.        Interim History:  She returns today with her . She is doing well and enjoys golfing. She is taking 4 capsules of Creon with each meal, and has occasional diarrhea with abdominal cramping that she relates to her metformin. Denies any pain or other symptoms. Her recent CT scan from last week shows no evidence of recurrence cancer.        Results for EZEQUIEL CHAVIS (MRN [id number]) as of 2019 14:06   Ref. Range 2018 09:05 3/29/2019 09:04 2019 08:45   Cancer Antigen 19-9 Latest Ref Range: 0 - 37 U/mL 2 2 2        Review Of Systems:  Full 10-point ROS reviewed. Pertinent symptoms reviewed above per HPI.    PAST MEDICAL HISTORY:   Pre-diabetes  Pancreatitis as above     PAST SURGICAL HISTORY:  Laparotomy x2 for ruptured tubal pregnancies    Discectomy for herniated lumbar disk  Breast reduction surgery     FAMILY HISTORY:  Colon cancer in maternal aunt  Paternal aunt and cousins with  breast cancer  CAD in father and brother     SH: , from [place], with 29 yr-old son. Works as . former smoker, quit 30 years ago. two glasses of wine per night, none since pancreatitis diagnosis     Allergies: none      Current Outpatient Medications:      acetaminophen (TYLENOL) 500 MG tablet, Take 2 tablets (1,000 mg) by mouth every 8 hours, Disp: 100 tablet, Rfl: 1     amylase-lipase-protease (CREON) 07507-78633 units CPEP per EC capsule, Take 2-3 with meals / 1-2 with snacks, up to 15 per day., Disp: 450 capsule, Rfl: 6     blood glucose monitoring (ONETOUCH VERIO IQ) test strip, Use to test blood sugar 4 times daily or as directed., Disp: 400 each, Rfl: 3     ibuprofen (ADVIL/MOTRIN) 200 MG tablet, Take 200 mg by mouth every 4 hours as needed for mild pain, Disp: , Rfl:      metFORMIN (GLUCOPHAGE-XR) 500 MG 24 hr tablet, Take 2 at breakfast and 1 at dinner, Disp: 270 tablet, Rfl: 3     ONETOUCH DELICA LANCETS 33G MISC, 4 lancets daily, Disp: 100 each, Rfl: 3     pantoprazole (PROTONIX) 40 MG EC tablet, Take 1 tablet (40 mg) by mouth every morning (Patient not taking: Reported on 4/30/2019), Disp: 30 tablet, Rfl: 0  No current facility-administered medications for this visit.     Facility-Administered Medications Ordered in Other Visits:      heparin 100 UNIT/ML injection 5 mL, 5 mL, Intracatheter, Once, Art Guevara MD      Physical Exam:  BP 91/65 (BP Location: Right arm, Patient Position: Sitting, Cuff Size: Adult Regular)   Pulse 65   Temp 97  F (36.1  C) (Oral)   Resp 16   Wt 61.6 kg (135 lb 12.8 oz)   SpO2 97%   BMI 22.60 kg/m       KPS 90  GENERAL:  Well-appearing, older  woman in no acute distress, alert and oriented x3.   HEENT:  Anicteric sclerae.  Oropharynx without mucositis or thrush.  No jaundice of the frenulum.     NECK:  There are no palpable nodes on palpation of the head and neck region.    CARDIOVASCULAR:  Regular rate and rhythm, normal S1 and  S2, no murmurs, gallops or rubs.   LUNGS:  Clear to auscultation throughout.   ABDOMEN:  Soft, with no tenderness. The midline surgical scar is well healed.  No fluctuance, erythema or tenderness.  No palpable masses, splenomegaly or ascites.   EXTREMITIES:  No evidence of lower extremity edema.           Laboratory/Imaging Studies  Results for EZEQUIEL CHAVIS (MRN [id number]) as of 7/5/2019 14:06   Ref. Range 12/21/2018 09:05 3/29/2019 09:04 7/1/2019 08:45   Cancer Antigen 19-9 Latest Ref Range: 0 - 37 U/mL 2 2 2     Lab Results   Component Value Date    WBC 8.9 07/01/2019     Lab Results   Component Value Date    RBC 4.22 07/01/2019     Lab Results   Component Value Date    HGB 12.8 07/01/2019     Lab Results   Component Value Date    HCT 40.2 07/01/2019     No components found for: MCT  Lab Results   Component Value Date    MCV 95 07/01/2019     Lab Results   Component Value Date    MCH 30.3 07/01/2019     Lab Results   Component Value Date    MCHC 31.8 07/01/2019     Lab Results   Component Value Date    RDW 13.1 07/01/2019     Lab Results   Component Value Date     07/01/2019         Results for orders placed or performed during the hospital encounter of 07/01/19   CT Chest/Abdomen/Pelvis w Contrast    Narrative    CT CHEST/ABDOMEN/PELVIS W CONTRAST 7/1/2019 9:30 AM    HISTORY: Pancreatic cancer. Follow-up.    CONTRAST:  69 mL Isovue 370.    TECHNIQUE: CT of the chest, abdomen, and pelvis is performed with IV  contrast.    Routine evaluated structures in the chest include the lungs,  mediastinal structures, pleura, and chest wall.    Routine assessed structures in the abdomen include the liver, spleen,  pancreas, adrenal glands, and kidneys. Also assessed are the  retroperitoneum, gastrointestinal tract, and the abdominal wall.    Intrapelvic anatomy is also assessed.    Radiation dose for this scan is reduced using automated exposure  control, adjustment of the mA and/or kV according to patient size,  or  iterative reconstruction technique.    COMPARISON: 3/29/2019.    FINDINGS:    Chest: There is a tiny 2 mm noncalcified right upper lobe pulmonary  nodule on axial image 21. This is stable dating back to at least  5/22/2018, consistent with a benign entity. A trace pericardial  effusion is unchanged.    Abdomen: Two small liver lesions are again seen, the largest of which  measures 1 cm. They are stable dating back to least 5/22/2018,  supportive evidence for benign entity. Prior resection on all the body  and tail of the pancreas is again seen.  The spleen is again not  identified.    Pelvis: There is a moderate to large amount of stool in the distal  sigmoid colon and rectum.      Impression    IMPRESSION:  Stable examination with no convincing metastatic or  recurrent neoplasm.    SHERIDAN ARGUETA MD         ASSESSMENT/PLAN:  Mrs. Bangura is a 58 year old woman with resected pancreatic tail adenocarcinoma.  She had initial extensive and severe necrotizing pancreatitis upon initial diagnosis and hospitalization that delayed treatment.  We ultimately were able to begin neoadjuvant FOLFIRINOX for 4 cycles beginning in January.  By the time she had pancreatectomy by Dr. Byrd in mid April there was no evidence of residual carcinoma seen on operative pathology.  She completed subsequently 4 months of gemcitabine and Xeloda in the adjuvant setting.       She is 9 months out from completion of adjuvant chemotherapy, and approx 14 months out from her surgical resection. She has no evidence of recurrence of cancer and is overall doing quite well. I suggested 3-month follow-up with CT c/a/p and labs up to one week in advance of follow-up visit with me. She will continue taking Creon supplements for pancreatic enzyme insufficiency, and continue follow-up management of her diabetes with endocrinologist Dr. Gallo.      I spent 30 minutes in consultation, including H&P and Discussion. >50% of time was spent in counseling and  in coordination of care.    Jovany Monk MD, PhD

## 2019-07-08 NOTE — PROCEDURES
Interventional Radiology Brief Post Procedure Note    Procedure: IR PORT REMOVAL RIGHT    Proceduralist: Shlomo Donaldson PA-C    Assistant: RT Tracie(R)    Time Out: Prior to the start of the procedure and with procedural staff participation, I verbally confirmed the patient s identity using two indicators, relevant allergies, that the procedure was appropriate and matched the consent or emergent situation, and that the correct equipment/implants were available. Immediately prior to starting the procedure I conducted the Time Out with the procedural staff and re-confirmed the patient s name, procedure, and site/side. (The Joint Commission universal protocol was followed.)  Yes    Sedation: Monitored Anesthesia Care (MAC) administered and documented by Anesthesia Care Provider    Findings: Completed removal of right chest port in its entirety.    Estimated Blood Loss: Minimal    Fluoroscopy Time: None    Specimens: None    Complications: 1. None     Condition: Stable    Plan: Follow-up per primary team.    Comments: See dictated procedure note for full details.    Shlomo Donaldson PA-C  Interventional Radiology  [phone]

## 2019-07-08 NOTE — OR NURSING
Pt. bradycardic in phase 2 recovery, normotensive, blood sugar 91 from 99 preop, patient denies nausea or light-headedness.  Given apple juice.  Able to get dressed without difficulty.  Dr. Brar notified of bradycardia, no interventions needed, pt. discharged.

## 2019-07-08 NOTE — ANESTHESIA POSTPROCEDURE EVALUATION
Anesthesia POST Procedure Evaluation    Patient: Kitty Bangura   MRN:     [id number] Gender:   female   Age:    58 year old :      1960        Preoperative Diagnosis: Malignant Neoplasm of Head of Pancreas   Procedure(s):  Right Port Removal   Postop Comments: No value filed.       Anesthesia Type:  MAC  No value filed.    Reportable Event: NO     PAIN: Uncomplicated   Sign Out status: Comfortable, Well controlled pain     PONV: No PONV   Sign Out status:  No Nausea or Vomiting     Neuro/Psych: Uneventful perioperative course   Sign Out Status: Preoperative baseline; Age appropriate mentation     Airway/Resp.: Uneventful perioperative course   Sign Out Status: Non labored breathing, age appropriate RR; Resp. Status within EXPECTED Parameters     CV: Uneventful perioperative course   Sign Out status: Appropriate BP and perfusion indices; Appropriate HR/Rhythm     Disposition:   Sign Out in:  PACU  Disposition:  Phase II; Home  Recovery Course: Uneventful  Follow-Up: Not required           Last Anesthesia Record Vitals:  CRNA VITALS  2019 1054 - 2019 1154      2019             Pulse:  55    SpO2:  100 %          Last PACU Vitals:  Vitals Value Taken Time   BP 93/62 2019 11:30 AM   Temp 36.4  C (97.5  F) 2019 11:30 AM   Pulse     Resp 16 2019 11:30 AM   SpO2 99 % 2019 11:30 AM   Temp src     NIBP 83/56 2019 11:21 AM   Pulse 55 2019 11:24 AM   SpO2 100 % 2019 11:24 AM   Resp     Temp     Ht Rate 50 2019 11:23 AM   Temp 2           Electronically Signed By: Ja Brar MD, 2019, 2:31 PM

## 2019-07-08 NOTE — ANESTHESIA PREPROCEDURE EVALUATION
Anesthesia Pre-Procedure Evaluation    Patient: Kitty Bangura   MRN:     [id number] Gender:   female   Age:    58 year old :      1960        Preoperative Diagnosis: Malignant Neoplasm of Head of Pancreas   Procedure(s):  Right Port Removal     Past Medical History:   Diagnosis Date     Diabetes (H)      Necrotizing pancreatitis      Pancreatic cancer (H)      PONV (postoperative nausea and vomiting)       Past Surgical History:   Procedure Laterality Date     ABDOMEN SURGERY      Ruptured tubal pregnancies, additional surgeries followed     BACK SURGERY  2004    L5, S1 discectomy     BREAST SURGERY      Breast reduction     COLONOSCOPY       COLONOSCOPY N/A 2018    Procedure: colonoscopy;  Surgeon: Lobito Marte MD;  Location: [facility]     ENDOSCOPIC RETROGRADE CHOLANGIOPANCREATOGRAM N/A 2018    Procedure: COMBINED ENDOSCOPIC RETROGRADE CHOLANGIOPANCREATOGRAPHY, PLACE TUBE/STENT;  Endoscopic retrograde cholangiopancreatogram with stent placement and cyst drainage bile ;  Surgeon: Vito Sanches MD;  Location: [facility]     ENDOSCOPIC ULTRASOUND LOWER GASTROINTESTIONAL TRACT (GI) N/A 2018    Procedure: ENDOSCOPIC ULTRASOUND LOWER GASTROINTESTIONAL TRACT (GI);  Endoscopic Ultrasound with guided Cyst-Gastrostomy;  Surgeon: González Metz MD;  Location: [facility]     ENDOSCOPIC ULTRASOUND, ESOPHAGOSCOPY, GASTROSCOPY, DUODENOSCOPY (EGD), NECROSECTOMY N/A 2017    Procedure: ENDOSCOPIC ULTRASOUND, ESOPHAGOSCOPY, GASTROSCOPY, DUODENOSCOPY (EGD), NECROSECTOMY;  Esophagogastroduodenoscopy, with  Necrosectomy and stents replacement  ;  Surgeon: González Metz MD;  Location: [facility]     ENDOSCOPIC ULTRASOUND, ESOPHAGOSCOPY, GASTROSCOPY, DUODENOSCOPY (EGD), NECROSECTOMY N/A 2017    Procedure: ENDOSCOPIC ULTRASOUND, ESOPHAGOSCOPY, GASTROSCOPY, DUODENOSCOPY (EGD), NECROSECTOMY;  Esophagogastroduodenoscopy with Necrosectomy, Balloon Dilation, stent removal X3 and  Cystgastrostomy stent Placement X2;  Surgeon: Guru Cecilia Staples MD;  Location: [facility]     ESOPHAGOSCOPY, GASTROSCOPY, DUODENOSCOPY (EGD), COMBINED N/A 2017    Procedure: COMBINED ENDOSCOPIC ULTRASOUND, ESOPHAGOSCOPY, GASTROSCOPY, DUODENOSCOPY (EGD), FINE NEEDLE ASPIRATE/BIOPSY;  Endoscopic Ultrasound with cystgastrostomy and fine needle aspiration ;  Surgeon: González Metz MD;  Location: [facility]     ESOPHAGOSCOPY, GASTROSCOPY, DUODENOSCOPY (EGD), COMBINED N/A 2018    Procedure: COMBINED ESOPHAGOSCOPY, GASTROSCOPY, DUODENOSCOPY (EGD);  EGD;  Surgeon: González Metz MD;  Location: [facility]     ESOPHAGOSCOPY, GASTROSCOPY, DUODENOSCOPY (EGD), COMBINED N/A 2018    Procedure: COMBINED ESOPHAGOSCOPY, GASTROSCOPY, DUODENOSCOPY (EGD), REMOVE FOREIGN BODY;;  Surgeon: González Metz MD;  Location: [facility]     GYN SURGERY  1983    Multiple ectopic pregnancies, reconnect,       INSERT PORT VASCULAR ACCESS Right 2018    Procedure: INSERT PORT VASCULAR ACCESS;  Chest Port Placement - right;  Surgeon: Chanda Lawson PA-C;  Location: [facility]     LAPAROSCOPY DIAGNOSTIC (GENERAL) N/A 2018    Procedure: LAPAROSCOPY DIAGNOSTIC (GENERAL);  Diagnostic Laparoscopy; Open Distal Pancreatectomy And Splenectomy, splenic flexure mobilization, cholecystectomy, intraoperative ultrasound;  Surgeon: Ja Byrd MD;  Location: [facility]     PANCREATECTOMY, SPLENECTOMY N/A 2018    Procedure: PANCREATECTOMY, SPLENECTOMY;;  Surgeon: Ja Byrd MD;  Location: [facility]          Anesthesia Evaluation     .             ROS/MED HX    ENT/Pulmonary:  - neg pulmonary ROS     Neurologic:  - neg neurologic ROS     Cardiovascular:  - neg cardiovascular ROS       METS/Exercise Tolerance:     Hematologic: Comments: Hx of thrombocytopenia - neg hematologic  ROS   (+) Anemia, -      Musculoskeletal:  - neg musculoskeletal ROS       GI/Hepatic:  - neg GI/hepatic ROS       Renal/Genitourinary:  " - ROS Renal section negative       Endo:  - neg endo ROS   (+) type II DM .      Psychiatric:  - neg psychiatric ROS       Infectious Disease:  - neg infectious disease ROS       Malignancy:   (+) Malignancy History of Other  Other CA Pancreatic Cancer status post      - no malignancy   Other:    - neg other ROS                     PHYSICAL EXAM:   Mental Status/Neuro: A/A/O   Airway: Facies: Feasible  Mallampati: I  Mouth/Opening: Full  TM distance: > 6 cm  Neck ROM: Full   Respiratory: Auscultation: CTAB     Resp. Rate: Normal     Resp. Effort: Normal      CV: Rhythm: Regular  Rate: Age appropriate  Heart: Normal Sounds   Comments:      Dental: Normal                  Lab Results   Component Value Date    WBC 8.9 07/01/2019    HGB 12.8 07/01/2019    HCT 40.2 07/01/2019     07/01/2019     07/01/2019    POTASSIUM 4.3 07/01/2019    CHLORIDE 107 07/01/2019    CO2 28 07/01/2019    BUN 17 07/01/2019    CR 0.57 07/01/2019     (H) 07/01/2019    ILIANA 9.3 07/01/2019    PHOS 3.7 07/16/2018    MAG 2.3 07/16/2018    ALBUMIN 3.7 07/01/2019    PROTTOTAL 7.3 07/01/2019    ALT 20 07/01/2019    AST 20 07/01/2019    ALKPHOS 92 07/01/2019    BILITOTAL 0.7 07/01/2019    LIPASE 71 (L) 02/11/2018    AMYLASE 21 (L) 04/20/2018    INR 1.01 07/08/2019    TSH 1.48 04/30/2019       Preop Vitals  BP Readings from Last 3 Encounters:   07/08/19 108/76   07/08/19 91/65   04/30/19 98/60    Pulse Readings from Last 3 Encounters:   07/08/19 65   04/30/19 65   04/05/19 78      Resp Readings from Last 3 Encounters:   07/08/19 16   07/08/19 16   04/30/19 14    SpO2 Readings from Last 3 Encounters:   07/08/19 97%   07/08/19 97%   04/30/19 98%      Temp Readings from Last 1 Encounters:   07/08/19 36.4  C (97.6  F) (Oral)    Ht Readings from Last 1 Encounters:   07/08/19 1.651 m (5' 5\")      Wt Readings from Last 1 Encounters:   07/08/19 61.3 kg (135 lb 1.9 oz)    Estimated body mass index is 22.49 kg/m  as calculated from the " "following:    Height as of this encounter: 1.651 m (5' 5\").    Weight as of this encounter: 61.3 kg (135 lb 1.9 oz).     LDA:  Peripheral IV 07/08/19 Right Hand (Active)   Site Assessment WDL 7/8/2019 10:12 AM   Line Status Infusing 7/8/2019 10:12 AM   Phlebitis Scale 0-->no symptoms 7/8/2019 10:12 AM   Infiltration Scale 0 7/8/2019 10:12 AM   Extravasation? No 7/8/2019 10:12 AM   Dressing Intervention New dressing  7/8/2019 10:12 AM   Number of days: 0       Port A Cath Single 01/12/18 Right Chest wall (Active)   Number of days: 542       Arterial Line 12/13/18 (Active)   Number of days: 207            Assessment:   ASA SCORE: 3    NPO Status: > 6 hours since completed [employer]   Documentation: H&P complete; Preop Testing complete; Consents complete   Proceeding: Proceed without further delay  Tobacco Use:  NO Active use of Tobacco/UNKNOWN Tobacco use status     Plan:   Anes. Type:  MAC   Pre-Induction: Midazolam IV   Induction:  IV (Standard)   Airway: Native Airway   Access/Monitoring: PIV   Maintenance: Propofol; IV   Emergence: Procedure Site   Logistics: Same Day Surgery     Postop Pain/Sedation Strategy:  Standard-Options: Opioids PRN     PONV Management:  Adult Risk Factors: Female, H/o PONV or Motion Sickness, Non-Smoker, Postop Opioids  Prevention: Propofol Infusion; Ondansetron     CONSENT: Direct conversation   Plan and risks discussed with: Patient   Blood Products: Consent Deferred (Minimal Blood Loss)                         Ja Brar MD  "

## 2019-07-08 NOTE — DISCHARGE INSTRUCTIONS
A collaboration between [facility] and [facility]  Experts in minimally invasive, targeted treatments performed using imaging guidance    Venous Access Device, Port Catheter or Tunneled Central Line Removal    Today you had your existing venous access device removed, either because it was no longer needed or because there was malfunction or infection issues.    One of our Radiology PAs performed this procedure for you today:  ? Mao Prince, [facility], PAQuiqueC  ? RADHA Hsu, PA-C  ? Shlomo Donaldson PA-C  ? Chanda Watkins MS, PA-C  ? Angelo Allen PA-C    After you go home:  - Drink plenty of fluids.  Generally 6-8 (8 ounce) glasses a day is recommended.  - Resume your regular diet unless otherwise ordered by a medical provider.  - Keep any applied tape/gauze dressings clean and dry.  Change tape/gauze dressings if they get wet or soiled.  - You may shower the following day after procedure, however cover and protect from moisture any tape/gauze dressings.  You may let water hit and run over dried skin glue, but do not scrub.  Pat the area dry after showering.  - Port removal incisions are closed with absorbable suture, meaning they do not need to be removed at a later date, and a topical skin adhesive (skin glue).  This glue will wear off in 7-14 days.  Do not remove before this time.  If 14 days have passed and residual glue is present, you may gently remove it.  - You may remove tape/gauze dressings after 5 days if the site looks closed and in the process of healing.  - Do not apply gels, lotions, or ointments to the glue site for the first 10 days as this may cause the glue to prematurely soften and fail.  - Do not perform strenuous activities or lift greater than 10 pounds for the next three days.  - If there is bleeding or oozing from the procedure site, apply firm pressure to the area for 5-10 minutes.  If the bleeding continues seek medical advice at the  numbers below.  - Mild procedure site discomfort can be treated with an ice pack and over-the-counter pain relievers.    Tylenol 975mg given at 9:50am. Next dose available after 3:50pm. Then follow package instructions.               For 24 hours after any sedation used:  - Relax and take it easy.  No strenuous activities.  - Do not drive or operate machines at home or at work.  - No alcohol consumption.  - Do not make any important or legal decisions.    Call our Interventional Radiology (IR) service if:  - If you start bleeding from the procedure site.  If you do start to bleed from the site, lie down and hold some pressure on the site.  Our radiology provider can help you decide if you need to return to the hospital.  - If you have new or worsening pain related to the procedure.  - If you have concerning swelling at the procedure site.  - If you develop persistent nausea or vomiting.  - If you develop hives or a rash or any unexplained itching.  - If you have a fever of greater than 100.5  F and chills in the first 5 days after procedure.  - Any other concerns related to your procedure.      [facility]  Interventional Radiology (IR)  [address] [place] [address]    Contact Number:  [phone]  (IR control desk)  - Monday - Friday 8:00 am - 4:30 pm    After hours for urgent concerns:  [phone]  - After 4:30 pm Monday - Friday, Weekends and Holidays.   - Ask for Interventional Radiology on-call.  Someone is available 24 hours a day.  - [facility] toll free number:  [phone]

## 2019-07-22 ENCOUNTER — OFFICE VISIT (OUTPATIENT)
Dept: ENDOCRINOLOGY | Facility: CLINIC | Age: 59
End: 2019-07-22
Payer: COMMERCIAL

## 2019-07-22 VITALS
DIASTOLIC BLOOD PRESSURE: 67 MMHG | SYSTOLIC BLOOD PRESSURE: 102 MMHG | WEIGHT: 139.4 LBS | BODY MASS INDEX: 23.22 KG/M2 | HEART RATE: 55 BPM | HEIGHT: 65 IN

## 2019-07-22 DIAGNOSIS — E11.9 TYPE 2 DIABETES MELLITUS WITHOUT COMPLICATION, WITHOUT LONG-TERM CURRENT USE OF INSULIN (H): Primary | ICD-10-CM

## 2019-07-22 PROBLEM — R73.9 HYPERGLYCEMIA: Status: RESOLVED | Noted: 2018-04-18 | Resolved: 2019-07-22

## 2019-07-22 PROBLEM — E86.0 DEHYDRATION: Status: RESOLVED | Noted: 2018-03-01 | Resolved: 2019-07-22

## 2019-07-22 LAB — HBA1C MFR BLD: 6.2 % (ref 4.3–6)

## 2019-07-22 RX ORDER — METFORMIN HCL 500 MG
TABLET, EXTENDED RELEASE 24 HR ORAL
Qty: 270 TABLET | Refills: 3 | Status: SHIPPED | OUTPATIENT
Start: 2019-07-22 | End: 2020-09-22

## 2019-07-22 ASSESSMENT — MIFFLIN-ST. JEOR: SCORE: 1203.19

## 2019-07-22 NOTE — PROGRESS NOTES
[facility]  Endocrinology  Es Gallo MD  07/22/2019      Chief Complaint:   Diabetes    History of Present Illness:   Kitty Bangura is a 58 year old woman with a history of pancreatic cancer s/p resection and type 2 diabetes mellitus who presents for follow up.    She has remained diligent with her exercise routine, walking daily with occasional jogging or weight lifting as well. She has lost some weight since her cancer treatment, she remains in the 136-140 pound range. She takes metformin 500 mg 3x per day, one tablet with each meal. She does have frequent bowel movements and is unsure if this is related to the digestive enzymes she takes. No other side effects.     She endorses symptoms of hypoglycemia that occur 2-3 times per month, usually during the day and associated with exercise. For example yesterday she felt shaky while exercising after a light breakfast. Symptoms always improve with a snack. She does not typically check her blood glucose during symptoms; when she has checked this was 79.     She also reports lightheadedness,which seems to be a seperate symptom. She has always run low blood pressures (as did her father). She is not taking any antihypertensive medications.     LDL was 108 on October 2018 labs, she is not on taking a statin.      Per oncology note she has no evidence of recurrence. She had her port removed.    Blood Glucose Monitoring:  We reviewed glucometer data together.  Average 126 with 15 readings over the past 2 weeks (usually fasting AM). No measured hypoglycemia.     Diabetes monitoring and complications:  CAD: No  Last eye exam:   HTN: No  On Statin: No  On Aspirin: No    Review of Systems:   Pertinent items are noted in HPI.  All other systems are negative.    Active Medications:     Current Outpatient Medications:      metFORMIN (GLUCOPHAGE-XR) 500 MG 24 hr tablet, Take 1 tablet 3 times a day with meals, Disp: 270 tablet, Rfl: 3     acetaminophen (TYLENOL) 500 MG tablet,  Take 2 tablets (1,000 mg) by mouth every 8 hours, Disp: 100 tablet, Rfl: 1     amylase-lipase-protease (CREON) 92323-07521 units CPEP per EC capsule, Take 2-3 with meals / 1-2 with snacks, up to 15 per day., Disp: 450 capsule, Rfl: 6     blood glucose monitoring (ONETOUCH VERIO IQ) test strip, Use to test blood sugar 4 times daily or as directed., Disp: 400 each, Rfl: 3     ibuprofen (ADVIL/MOTRIN) 200 MG tablet, Take 200 mg by mouth every 4 hours as needed for mild pain, Disp: , Rfl:      ONETOUCH DELICA LANCETS 33G MISC, 4 lancets daily, Disp: 100 each, Rfl: 3     pantoprazole (PROTONIX) 40 MG EC tablet, Take 1 tablet (40 mg) by mouth every morning (Patient not taking: Reported on 4/30/2019), Disp: 30 tablet, Rfl: 0  No current facility-administered medications for this visit.     Facility-Administered Medications Ordered in Other Visits:      heparin 100 UNIT/ML injection 5 mL, 5 mL, Intracatheter, Once, Art Guevara MD      Allergies:   Patient has no known allergies.      Past Medical History:  Type 2 diabetes mellitus  Necrotizing pancreatitis  Pancreatic adenocarcinoma  Leukocytosis  Thrombocytopenia  Hypotension  Hypernatremia     Past Surgical History:  Abdomen surgery  L5 S1 discectomy  Breast reduction  Colonoscopy x2  Endoscopic retrograde cholangiopancreatography   Upper gastrointestinal US  Lower gastrointestinal US  EGD with necrosectomy x2  EGD, multiple  pancreatectomy, splenectomy    Family History:   Father: heart disease, hyperlipidemia   Brother: heart disease  Mother: diabetes mellitus, hypertension, obesity  Maternal grandfather: diabetes mellitus, hypertension, obesity  Sister: diabetes mellitus, hypertension, depression, anxiety, obesity  Brother: hypertension, hyperlipidemia   Cousin: breast cancer  Other: mesothelioma, colon cancer  Maternal grandmother: obesity      Social History:     Former 0.5 PPD smoker for 4 years, quit 1981     Physical Exam:   /67   Pulse  "55   Ht 1.651 m (5' 5\")   Wt 63.2 kg (139 lb 6.4 oz)   BMI 23.20 kg/m       Wt Readings from Last 10 Encounters:   07/22/19 63.2 kg (139 lb 6.4 oz)   07/08/19 61.3 kg (135 lb 1.9 oz)   07/08/19 61.6 kg (135 lb 12.8 oz)   04/30/19 64 kg (141 lb)   04/05/19 64 kg (141 lb)   01/14/19 64.2 kg (141 lb 9.6 oz)   12/31/18 63.7 kg (140 lb 8 oz)   12/11/18 63.5 kg (140 lb)   10/09/18 68 kg (150 lb)   10/05/18 67.2 kg (148 lb 3.2 oz)      General: Well appearing woman in no distress.   Eyes: EOMI. Sclerae and conjunctivae are clear.    HENT: No thyromegaly or mass.    Lymphatic: No cervical lymphadenopathy.  Cardiovascular: RRR, with normal S1+S2 and no murmurs.   Respiratory: Lungs are clear to auscultation.   Extremities: No peripheral edema. Feet are warm and well perfused. No lesions or ulcers.   Neurologic: Sensation to light monofilament is normal to mildly decreased in the feet bilaterally      Data:  Component      Latest Ref Rng & Units 7/22/2019   Hemoglobin A1C      4.3 - 6 % 6.2 (A)       Assessment   1. Type 2 diabetes mellitus, with excellent glycemic control.   2. History of steroid induced hyperglycemia.   3. History of pancreatic tail adenocarcinoma s/p resection, with no evidence for recurrence of disease.   4. History of necrotizing pancreatitis during treatment for above.       Plan:  - Continue metformin at current dose.   - Check blood glucose during symptoms of shakiness or lightheadedness, or anytime she is feeling off, to rule out hypoglycemia. The risk of hypoglycemia while taking metformin only is very low.   - I recommended that she add a statin for primary prevention of cardiovascular disease. She will discuss this with her primary doctor at an upcoming appointment.   - Check urine microalbumin at her convenience.   - She was reminded to have a yearly dilated eye exam.     Blood sugars have remained at goal and have been stable since last visit, with no changes in treatment. There is no plan " for resumption of steroids in the foreseeable future. We discussed that it would be appropriate for her to follow up with primary care for diabetes at this point, and that she should return to endocrine clinic if any new questions or issues arise. She was comfortable with this plan.      Follow-up: Return if symptoms worsen or fail to improve.       Scribe Disclosure:  I, Zhang Lew, am serving as a scribe to document services personally performed by Es Gallo MD at this visit, based upon the provider's statements to me. All documentation has been reviewed by the aforementioned provider prior to being entered into the official medical record.     Portions of this medical record were completed by a scribe. UPON MY REVIEW AND AUTHENTICATION BY ELECTRONIC SIGNATURE, this confirms (a) I performed the applicable clinical services, and (b) the record is accurate.      Es Gallo MD  Endocrinology and Diabetes   [phone]

## 2019-07-22 NOTE — PATIENT INSTRUCTIONS
You have earned this graduation by achieving diabetes related goals and stability.    It has been a pleasure serving you.  Please use the lessons learned in the diabetes clinic as a base on which to build a lifetime of better health and wellness.  You should continue to regularly follow up with your primary care provider for diabetes management.    Please schedule appointment with your primary care provider within the next 3 months.   We have refilled all of your diabetes-related medication for the next year.    Future changes and refills should come from your primary care provider.     The diabetes care team remains available to you for future consultation should you and your doctor have new questions or concerns.

## 2019-07-22 NOTE — LETTER
7/22/2019       RE: Kitty Bangura  [address] [facility] [id number]     Dear Colleague,    Thank you for referring your patient, Kitty Bangura, to the [facility] ENDOCRINOLOGY at [facility]. Please see a copy of my visit note below.    [facility]  Endocrinology  Es Gallo MD  07/22/2019      Chief Complaint:   Diabetes    History of Present Illness:   Kitty Bangura is a 58 year old woman with a history of pancreatic cancer s/p resection and type 2 diabetes mellitus who presents for follow up.    She has remained diligent with her exercise routine, walking daily with occasional jogging or weight lifting as well. She has lost some weight since her cancer treatment, she remains in the 136-140 pound range. She takes metformin 500 mg 3x per day, one tablet with each meal. She does have frequent bowel movements and is unsure if this is related to the digestive enzymes she takes. No other side effects.     She endorses symptoms of hypoglycemia that occur 2-3 times per month, usually during the day and associated with exercise. For example yesterday she felt shaky while exercising after a light breakfast. Symptoms always improve with a snack. She does not typically check her blood glucose during symptoms; when she has checked this was 79.     She also reports lightheadedness,which seems to be a seperate symptom. She has always run low blood pressures (as did her father). She is not taking any antihypertensive medications.     LDL was 108 on October 2018 labs, she is not on taking a statin.      Per oncology note she has no evidence of recurrence. She had her port removed.    Blood Glucose Monitoring:  We reviewed glucometer data together.  Average 126 with 15 readings over the past 2 weeks (usually fasting AM). No measured hypoglycemia.     Diabetes monitoring and complications:  CAD: No  Last eye exam:   HTN: No  On Statin: No  On Aspirin: No    Review of  Systems:   Pertinent items are noted in HPI.  All other systems are negative.    Active Medications:     Current Outpatient Medications:      metFORMIN (GLUCOPHAGE-XR) 500 MG 24 hr tablet, Take 1 tablet 3 times a day with meals, Disp: 270 tablet, Rfl: 3     acetaminophen (TYLENOL) 500 MG tablet, Take 2 tablets (1,000 mg) by mouth every 8 hours, Disp: 100 tablet, Rfl: 1     amylase-lipase-protease (CREON) 07328-53225 units CPEP per EC capsule, Take 2-3 with meals / 1-2 with snacks, up to 15 per day., Disp: 450 capsule, Rfl: 6     blood glucose monitoring (ONETOUCH VERIO IQ) test strip, Use to test blood sugar 4 times daily or as directed., Disp: 400 each, Rfl: 3     ibuprofen (ADVIL/MOTRIN) 200 MG tablet, Take 200 mg by mouth every 4 hours as needed for mild pain, Disp: , Rfl:      ONETOUCH DELICA LANCETS 33G MISC, 4 lancets daily, Disp: 100 each, Rfl: 3     pantoprazole (PROTONIX) 40 MG EC tablet, Take 1 tablet (40 mg) by mouth every morning (Patient not taking: Reported on 4/30/2019), Disp: 30 tablet, Rfl: 0  No current facility-administered medications for this visit.     Facility-Administered Medications Ordered in Other Visits:      heparin 100 UNIT/ML injection 5 mL, 5 mL, Intracatheter, Once, Art Guevara MD      Allergies:   Patient has no known allergies.      Past Medical History:  Type 2 diabetes mellitus  Necrotizing pancreatitis  Pancreatic adenocarcinoma  Leukocytosis  Thrombocytopenia  Hypotension  Hypernatremia     Past Surgical History:  Abdomen surgery  L5 S1 discectomy  Breast reduction  Colonoscopy x2  Endoscopic retrograde cholangiopancreatography   Upper gastrointestinal US  Lower gastrointestinal US  EGD with necrosectomy x2  EGD, multiple  pancreatectomy, splenectomy    Family History:   Father: heart disease, hyperlipidemia   Brother: heart disease  Mother: diabetes mellitus, hypertension, obesity  Maternal grandfather: diabetes mellitus, hypertension, obesity  Sister: diabetes  "mellitus, hypertension, depression, anxiety, obesity  Brother: hypertension, hyperlipidemia   Cousin: breast cancer  Other: mesothelioma, colon cancer  Maternal grandmother: obesity      Social History:     Former 0.5 PPD smoker for 4 years, quit 1981     Physical Exam:   /67   Pulse 55   Ht 1.651 m (5' 5\")   Wt 63.2 kg (139 lb 6.4 oz)   BMI 23.20 kg/m        Wt Readings from Last 10 Encounters:   07/22/19 63.2 kg (139 lb 6.4 oz)   07/08/19 61.3 kg (135 lb 1.9 oz)   07/08/19 61.6 kg (135 lb 12.8 oz)   04/30/19 64 kg (141 lb)   04/05/19 64 kg (141 lb)   01/14/19 64.2 kg (141 lb 9.6 oz)   12/31/18 63.7 kg (140 lb 8 oz)   12/11/18 63.5 kg (140 lb)   10/09/18 68 kg (150 lb)   10/05/18 67.2 kg (148 lb 3.2 oz)      General: Well appearing woman in no distress.   Eyes: EOMI. Sclerae and conjunctivae are clear.    HENT: No thyromegaly or mass.    Lymphatic: No cervical lymphadenopathy.  Cardiovascular: RRR, with normal S1+S2 and no murmurs.   Respiratory: Lungs are clear to auscultation.   Extremities: No peripheral edema. Feet are warm and well perfused. No lesions or ulcers.   Neurologic: Sensation to light monofilament is  normal to mildly decreased in the feet bilaterally      Data:  Component      Latest Ref Rng & Units 7/22/2019   Hemoglobin A1C      4.3 - 6 % 6.2 (A)       Assessment   1. Type 2 diabetes mellitus,  with excellent glycemic control.   2. History of steroid induced hyperglycemia.   3. History of pancreatic tail adenocarcinoma s/p resection, with no evidence for recurrence of disease.   4. History of necrotizing pancreatitis during treatment for above.       Plan:  - Continue metformin at current dose.   - Check blood glucose during symptoms of shakiness or lightheadedness, or anytime she is feeling off, to rule out hypoglycemia. The risk of hypoglycemia while taking metformin only is very low.   - I recommended that she add a statin for primary prevention of cardiovascular disease. She " will discuss this with her primary doctor at an upcoming appointment.   - Check urine microalbumin at her convenience.   - She was reminded to have a yearly dilated eye exam.     Blood sugars have remained at goal and have been stable since last visit, with no changes in treatment. There is no plan for resumption of steroids in the foreseeable future. We discussed that it would be appropriate for her to follow up with primary care for diabetes at this point, and that she should return to endocrine clinic if any new questions or issues arise. She was comfortable with this plan.      Follow-up: Return if symptoms worsen or fail to improve.       Scribe Disclosure:  I, Zhang Lew, am serving as a scribe to document services personally performed by Es Gallo MD at this visit, based upon the provider's statements to me. All documentation has been reviewed by the aforementioned provider prior to being entered into the official medical record.     Portions of this medical record were completed by a scribe. UPON MY REVIEW AND AUTHENTICATION BY ELECTRONIC SIGNATURE, this confirms (a) I performed the applicable clinical services, and (b) the record is accurate.      Es Gallo MD  Endocrinology and Diabetes   [phone]

## 2019-07-30 ENCOUNTER — TELEPHONE (OUTPATIENT)
Dept: ENDOCRINOLOGY | Facility: CLINIC | Age: 59
End: 2019-07-30

## 2019-07-30 NOTE — TELEPHONE ENCOUNTER
Health Call Center    Phone Message    May a detailed message be left on voicemail: yes    Reason for Call: Medication Question or concern regarding medication   Prescription Clarification  Name of Medication: metFORMIN (GLUCOPHAGE-XR) 500 MG 24 hr tablet  Prescribing Provider: Dr. Es Gallo   Pharmacy: [facility] [phone]   What on the order needs clarification? Patient received letter from [facility] that they are waiting to hear back from Dr. Gallo for an office review of how the medication safely works in patient's case.  They have reached out to Dr. Gallo a few times but, have not heard back.  Please follow up with pharmacy and then with patient (via home phone - okay to leave a message, per pt as she will not be home on 7/31/19).  Thank you!      Action Taken: Message routed to:  [facility]

## 2019-08-01 NOTE — TELEPHONE ENCOUNTER
Spoke with Quita Lundberg, [employer], states they are just determining the shipping dates, should be able to call in to set up shipping, no further information needed.   Barby Conroy RN on 8/1/2019 at 12:30 PM

## 2019-08-02 DIAGNOSIS — E11.9 TYPE 2 DIABETES MELLITUS WITHOUT COMPLICATION, WITHOUT LONG-TERM CURRENT USE OF INSULIN (H): ICD-10-CM

## 2019-08-02 LAB
CREAT UR-MCNC: 23 MG/DL
MICROALBUMIN UR-MCNC: <5 MG/L
MICROALBUMIN/CREAT UR: NORMAL MG/G CR (ref 0–25)

## 2019-08-02 PROCEDURE — 82043 UR ALBUMIN QUANTITATIVE: CPT | Performed by: INTERNAL MEDICINE

## 2019-08-07 NOTE — TELEPHONE ENCOUNTER
FUTURE VISIT INFORMATION      FUTURE VISIT INFORMATION:    Date: 8/8/19    Time: 1:00 pm    Location: [facility]  REFERRAL INFORMATION:    Referring provider:  Doc    Referring providers clinic:  Self    Reason for visit/diagnosis  Recurring mouth sore    RECORDS REQUESTED FROM:       Clinic name Comments Records Status Imaging Status   [facility] Office Visit-4/30/19-Dr. Solange Zelaya    [facility] Office Visit-7/23/18-Kellie Zelaya

## 2019-08-08 ENCOUNTER — PRE VISIT (OUTPATIENT)
Dept: OTOLARYNGOLOGY | Facility: CLINIC | Age: 59
End: 2019-08-08

## 2019-08-08 ENCOUNTER — OFFICE VISIT (OUTPATIENT)
Dept: OTOLARYNGOLOGY | Facility: CLINIC | Age: 59
End: 2019-08-08
Payer: COMMERCIAL

## 2019-08-08 ENCOUNTER — TELEPHONE (OUTPATIENT)
Dept: OTOLARYNGOLOGY | Facility: CLINIC | Age: 59
End: 2019-08-08

## 2019-08-08 VITALS
BODY MASS INDEX: 22.18 KG/M2 | DIASTOLIC BLOOD PRESSURE: 70 MMHG | RESPIRATION RATE: 18 BRPM | SYSTOLIC BLOOD PRESSURE: 106 MMHG | HEIGHT: 66 IN | HEART RATE: 79 BPM | WEIGHT: 138 LBS

## 2019-08-08 DIAGNOSIS — K13.79 MOUTH SORE: ICD-10-CM

## 2019-08-08 DIAGNOSIS — K13.79 MOUTH SORE: Primary | ICD-10-CM

## 2019-08-08 RX ORDER — LIDOCAINE HYDROCHLORIDE AND EPINEPHRINE 10; 10 MG/ML; UG/ML
3 INJECTION, SOLUTION INFILTRATION; PERINEURAL ONCE
Status: COMPLETED | OUTPATIENT
Start: 2019-08-08 | End: 2019-08-08

## 2019-08-08 RX ADMIN — LIDOCAINE HYDROCHLORIDE AND EPINEPHRINE 3 ML: 10; 10 INJECTION, SOLUTION INFILTRATION; PERINEURAL at 13:41

## 2019-08-08 ASSESSMENT — PAIN SCALES - GENERAL: PAINLEVEL: MILD PAIN (3)

## 2019-08-08 ASSESSMENT — MIFFLIN-ST. JEOR: SCORE: 1208.71

## 2019-08-08 NOTE — PROGRESS NOTES
"[facility] Ear, Nose and Throat Clinic New Patient Visit Note        Otolaryngology        August 8, 2019    Referring Provider:  Self/Solange Mcgill MD        HPI:  Kitty Bangura is a 58 year old female who presents for evaluation of a sore on the left side of her mouth.  Patient reports that in December, she started noticing soreness in the left part of her cheek on the inside.  Also in the \"arch in the back of her throat on the left\".  She states that the sores feel like sore similar to what she had when she was on chemotherapy, but she has not had those sores since October 2018.  This soreness on the left inside of her cheek started late December 2018, well after these other sores have healed.  The sore in the left inside of her cheek has been quite persistent and is not gone away.  She has had some other sores a little bit further back and a sensation of a sore on the back of her tongue on the left that comes and goes.  It is so painful at times that she has trouble eating.  She has been using liquid lidocaine and placing liquid oxycodone on it topically.  Sometimes she does have trouble eating but she is been maintaining her weight.  She is using supplements to help with that.  She occasionally has some intermittent hoarseness of her voice and a slight cough.  She also has some intermittent drainage.    Some of the pain is now rating up into the left ear.  She does not notice any hearing loss.  She does report history of getting tonsil stones.    ROS:  10 point review of systems completed and is negative except for those listed above    PMH:   Past Medical History:   Diagnosis Date     Arthritis Post chemo    Suspected     Diabetes (H)      Necrotizing pancreatitis      Pancreatic cancer (H)      Pneumonia     2016     PONV (postoperative nausea and vomiting)        PSH:   Past Surgical History:   Procedure Laterality Date     ABDOMEN SURGERY  1983    Ruptured tubal pregnancies, additional surgeries " followed     BACK SURGERY  2004    L5, S1 discectomy     BREAST SURGERY  2003    Breast reduction     COLONOSCOPY  2008     COLONOSCOPY N/A 12/11/2018    Procedure: colonoscopy;  Surgeon: Lobito Marte MD;  Location: [facility]     ENDOSCOPIC RETROGRADE CHOLANGIOPANCREATOGRAM N/A 1/25/2018    Procedure: COMBINED ENDOSCOPIC RETROGRADE CHOLANGIOPANCREATOGRAPHY, PLACE TUBE/STENT;  Endoscopic retrograde cholangiopancreatogram with stent placement and cyst drainage bile ;  Surgeon: Vito Sanches MD;  [facility]     ENDOSCOPIC ULTRASOUND LOWER GASTROINTESTIONAL TRACT (GI) N/A 1/25/2018    Procedure: ENDOSCOPIC ULTRASOUND LOWER GASTROINTESTIONAL TRACT (GI);  Endoscopic Ultrasound with guided Cyst-Gastrostomy;  Surgeon: González Metz MD;  [facility]     ENDOSCOPIC ULTRASOUND, ESOPHAGOSCOPY, GASTROSCOPY, DUODENOSCOPY (EGD), NECROSECTOMY N/A 12/4/2017    Procedure: ENDOSCOPIC ULTRASOUND, ESOPHAGOSCOPY, GASTROSCOPY, DUODENOSCOPY (EGD), NECROSECTOMY;  Esophagogastroduodenoscopy, with  Necrosectomy and stents replacement  ;  Surgeon: González Metz MD;  [facility]     ENDOSCOPIC ULTRASOUND, ESOPHAGOSCOPY, GASTROSCOPY, DUODENOSCOPY (EGD), NECROSECTOMY N/A 12/12/2017    Procedure: ENDOSCOPIC ULTRASOUND, ESOPHAGOSCOPY, GASTROSCOPY, DUODENOSCOPY (EGD), NECROSECTOMY;  Esophagogastroduodenoscopy with Necrosectomy, Balloon Dilation, stent removal X3 and Cystgastrostomy stent Placement X2;  Surgeon: Guru Cecilia Staples MD;  [facility]     ESOPHAGOSCOPY, GASTROSCOPY, DUODENOSCOPY (EGD), COMBINED N/A 11/29/2017    Procedure: COMBINED ENDOSCOPIC ULTRASOUND, ESOPHAGOSCOPY, GASTROSCOPY, DUODENOSCOPY (EGD), FINE NEEDLE ASPIRATE/BIOPSY;  Endoscopic Ultrasound with cystgastrostomy and fine needle aspiration ;  Surgeon: González Metz MD;  [facility]     ESOPHAGOSCOPY, GASTROSCOPY, DUODENOSCOPY (EGD), COMBINED N/A 5/8/2018    Procedure: COMBINED ESOPHAGOSCOPY, GASTROSCOPY,  DUODENOSCOPY (EGD);  EGD;  Surgeon: González Metz MD;  Location:  GI     ESOPHAGOSCOPY, GASTROSCOPY, DUODENOSCOPY (EGD), COMBINED N/A 2018    Procedure: COMBINED ESOPHAGOSCOPY, GASTROSCOPY, DUODENOSCOPY (EGD), REMOVE FOREIGN BODY;;  Surgeon: González Metz MD;  Location:  GI     GYN SURGERY  1983    Multiple ectopic pregnancies, reconnect,       INSERT PORT VASCULAR ACCESS Right 2018    Procedure: INSERT PORT VASCULAR ACCESS;  Chest Port Placement - right;  Surgeon: Chanda Lawson PA-C;  [facility]     IR PORT REMOVAL RIGHT  2019     LAPAROSCOPY DIAGNOSTIC (GENERAL) N/A 2018    Procedure: LAPAROSCOPY DIAGNOSTIC (GENERAL);  Diagnostic Laparoscopy; Open Distal Pancreatectomy And Splenectomy, splenic flexure mobilization, cholecystectomy, intraoperative ultrasound;  Surgeon: Ja Byrd MD;  [facility]     PANCREATECTOMY, SPLENECTOMY N/A 2018    Procedure: PANCREATECTOMY, SPLENECTOMY;;  Surgeon: Ja Byrd MD;  [facility]     REMOVE PORT VASCULAR ACCESS Right 2019    Procedure: Right Port Removal;  Surgeon: Juan J Donaldson PA-C;  [facility]       FamH:   Family History   Problem Relation Age of Onset     Heart Disease Father      Hyperlipidemia Father      Heart Disease Brother      Diabetes Mother      Hypertension Mother      Obesity Mother      Depression Mother      Diabetes Maternal Grandfather      Hypertension Maternal Grandfather      Obesity Maternal Grandfather      Diabetes Sister      Hypertension Sister      Depression Sister      Anxiety Disorder Sister      Obesity Sister      Hypertension Brother      Hyperlipidemia Brother      Heart Disease Brother      Breast Cancer Cousin      Breast Cancer Cousin      Breast Cancer Cousin      Colon Cancer Other      Other Cancer Other         Mesothelioma     Depression Sister      Anxiety Disorder Brother      Anxiety Disorder Son      Depression Son      Mental Illness  "Sister         Schizophrenia     Hypertension Sister      Obesity Maternal Grandmother      Obesity Sister      Diabetes Nephew      Hypertension Brother        SocH:   Social History     Tobacco Use     Smoking status: Former Smoker     Packs/day: 0.50     Years: 5.00     Pack years: 2.50     Types: Cigarettes     Start date: 1974     Last attempt to quit: 1981     Years since quittin.6     Smokeless tobacco: Never Used   Substance Use Topics     Alcohol use: No     Alcohol/week: 12.6 oz     Comment: Now quit since Oct 31.  Moderate drinker in past     Drug use: No       Medications:   Current Outpatient Medications   Medication     acetaminophen (TYLENOL) 500 MG tablet     amylase-lipase-protease (CREON) 74625-24479 units CPEP per EC capsule     blood glucose monitoring (ONETOUCH VERIO IQ) test strip     ibuprofen (ADVIL/MOTRIN) 200 MG tablet     magic mouthwash (ENTER INGREDIENTS IN COMMENTS) suspension     metFORMIN (GLUCOPHAGE-XR) 500 MG 24 hr tablet     ONETOUCH DELICA LANCETS 33G MISC     pantoprazole (PROTONIX) 40 MG EC tablet     No current facility-administered medications for this visit.      Facility-Administered Medications Ordered in Other Visits   Medication     heparin 100 UNIT/ML injection 5 mL       Allergies:   No Known Allergies      Physical Exam:   /70   Pulse 79   Resp 18   Ht 1.67 m (5' 5.75\")   Wt 62.6 kg (138 lb)   BMI 22.44 kg/m      Constitutional: The patient was unaccompanied, well-groomed, and in no acute distress.    Head: Normocephalic and atraumatic.   Eyes: Pupils were equal and reactive.  Extraocular movement intact.    Ears: Pinnae and tragus non-tender.  EACs and TMs were clear under microscopic exam.   Nose: Sinuses were non-tender.  Anterior rhinoscopy revealed midline septum and absence of purulence or polyps.    Mouth: Mucosa pink and moist, 6 mm ulcer in the left posterior buccal mucosa with surrounding erythema and leukoplakia, leukoplakia also noted in " the retromolar trigone (see pictures),  tonsils non-erythematous, no exudates, uvula midline  Neck: No lymphadenopathy in the neck.  No palpable thyroid.  Normal range of motion  Respiratory: Breathing comfortably without stridor or exertion of accessory muscles.   Skin: Normal:  warm and pink without rash  Neurologic: Alert and oriented x 3.  CN's III-XII within normal limits.  Voice normal.   Psychiatric: The patient's affect was calm, cooperative, and appropriate.    Communication: Normal; communicates verbally, normal voice quality.                Fiberoptic Endoscopy:  Consent for fiberoptic laryngoscopy was obtained, and we confirmed correctness of procedure and identity of patient.  Fiberoptic laryngoscopy was indicated due to posterior tongue pain and report of sore.  The nose was topically decongested and anesthetized.  The fiberoptic laryngoscope was passed under endoscopic vision.  The turbinates were normal.  The inferior and middle meati were clear bilaterally without purulence, masses, or polyps.  The nasopharynx was clear.  The Eustachian tubes were clear.  The soft palate appeared normal with good mobility.  The epiglottis was sharp and the visualized portion of the vallecula was clear.  The larynx was clear with mobile cords.  The arytenoids were clear and there was no pooling in the hypopharynx.             PROCEDURE:  BIOPSY: After obtaining informed consent from Ms. Bangura, we put Ms. Bangura in a comfortable position. I injected the biopsy site with 1% lidocaine with 1:100,000 epinephrine. Once anesthesia was ascertained, I used a nasal cup forceps to sample the abnormal tissue.  1 sample were taken and sent for permanent pathology.  Hemostasis was easily assured with pressure and silver nitrate stick. The patient tolerated the procedure well.          Assessment/Plan:   1. Mouth Sore, left buccal mucosa.    Concerning due to the look of the lesion and length of time present.  Also with  associated leukoplakia.  Recommend biopsy in clinic today.  Discussed risk, including infection, bleeding, pain.  Patient agreed.  Cup biopsy taken and sent for pathology.    Patient aware it can take 5-7 working days for results.  I will call her with the results when they are back.  Depending on what the results are, I will get her either treated appropriately or referred to the appropriate surgeon.  We did discuss the differential diagnosis could include a viral etiology, infectious etiology such as yeast or malignancy.  Patient comfortable with plan.        Wanda Caldera PA-C  Otolaryngology  Head & Neck Surgery  [phone]      CC:  Solange Mcgill MD  [facility]

## 2019-08-08 NOTE — LETTER
"8/8/2019       RE: Kitty Bangura  [address] [address] [facility] [id number]     Dear Colleague,    Thank you for referring your patient, Kitty Bangura, to the [facility] EAR NOSE AND THROAT at [facility]. Please see a copy of my visit note below.    [facility] Ear[facility] Nose [facility] Throat Clinic New Patient Visit Note        Otolaryngology        August 8, 2019    Referring Provider:  Self/Solange Mcgill MD        HPI:  Kitty Bangura is a 58 year old female who presents for evaluation of a sore on the left side of her mouth.  Patient reports that in December, she started noticing soreness in the left part of her cheek on the inside.  Also in the \"arch in the back of her throat on the left\".  She states that the sores feel like sore similar to what she had when she was on chemotherapy, but she has not had those sores since October 2018.  This soreness on the left inside of her cheek started late December 2018, well after these other sores have healed.  The sore in the left inside of her cheek has been quite persistent and is not gone away.  She has had some other sores a little bit further back and a sensation of a sore on the back of her tongue on the left that comes and goes.  It is so painful at times that she has trouble eating.  She has been using liquid lidocaine and placing liquid oxycodone on it topically.  Sometimes she does have trouble eating but she is been maintaining her weight.  She is using supplements to help with that.  She occasionally has some intermittent hoarseness of her voice and a slight cough.  She also has some intermittent drainage.    Some of the pain is now rating up into the left ear.  She does not notice any hearing loss.  She does report history of getting tonsil stones.    ROS:  10 point review of systems completed and is negative except for those listed above    PMH:   Past Medical History:   Diagnosis Date     Arthritis Post chemo    " Suspected     Diabetes (H)      Necrotizing pancreatitis      Pancreatic cancer (H)      Pneumonia     2016     PONV (postoperative nausea and vomiting)        PSH:   Past Surgical History:   Procedure Laterality Date     ABDOMEN SURGERY  1983    Ruptured tubal pregnancies, additional surgeries followed     BACK SURGERY  2004    L5, S1 discectomy     BREAST SURGERY  2003    Breast reduction     COLONOSCOPY  2008     COLONOSCOPY N/A 12/11/2018    Procedure: colonoscopy;  Surgeon: Lobito Marte MD;  Location: [facility]     ENDOSCOPIC RETROGRADE CHOLANGIOPANCREATOGRAM N/A 1/25/2018    Procedure: COMBINED ENDOSCOPIC RETROGRADE CHOLANGIOPANCREATOGRAPHY, PLACE TUBE/STENT;  Endoscopic retrograde cholangiopancreatogram with stent placement and cyst drainage bile ;  Surgeon: Vito Sanches MD;  [facility]     ENDOSCOPIC ULTRASOUND LOWER GASTROINTESTIONAL TRACT (GI) N/A 1/25/2018    Procedure: ENDOSCOPIC ULTRASOUND LOWER GASTROINTESTIONAL TRACT (GI);  Endoscopic Ultrasound with guided Cyst-Gastrostomy;  Surgeon: González Metz MD;  [facility]     ENDOSCOPIC ULTRASOUND, ESOPHAGOSCOPY, GASTROSCOPY, DUODENOSCOPY (EGD), NECROSECTOMY N/A 12/4/2017    Procedure: ENDOSCOPIC ULTRASOUND, ESOPHAGOSCOPY, GASTROSCOPY, DUODENOSCOPY (EGD), NECROSECTOMY;  Esophagogastroduodenoscopy, with  Necrosectomy and stents replacement  ;  Surgeon: González Metz MD;  [facility]     ENDOSCOPIC ULTRASOUND, ESOPHAGOSCOPY, GASTROSCOPY, DUODENOSCOPY (EGD), NECROSECTOMY N/A 12/12/2017    Procedure: ENDOSCOPIC ULTRASOUND, ESOPHAGOSCOPY, GASTROSCOPY, DUODENOSCOPY (EGD), NECROSECTOMY;  Esophagogastroduodenoscopy with Necrosectomy, Balloon Dilation, stent removal X3 and Cystgastrostomy stent Placement X2;  Surgeon: Guru Cecilia Staples MD;  [facility]     ESOPHAGOSCOPY, GASTROSCOPY, DUODENOSCOPY (EGD), COMBINED N/A 11/29/2017    Procedure: COMBINED ENDOSCOPIC ULTRASOUND, ESOPHAGOSCOPY, GASTROSCOPY,  DUODENOSCOPY (EGD), FINE NEEDLE ASPIRATE/BIOPSY;  Endoscopic Ultrasound with cystgastrostomy and fine needle aspiration ;  Surgeon: González Metz MD;  [facility]     ESOPHAGOSCOPY, GASTROSCOPY, DUODENOSCOPY (EGD), COMBINED N/A 2018    Procedure: COMBINED ESOPHAGOSCOPY, GASTROSCOPY, DUODENOSCOPY (EGD);  EGD;  Surgeon: González Metz MD;  [facility]     ESOPHAGOSCOPY, GASTROSCOPY, DUODENOSCOPY (EGD), COMBINED N/A 2018    Procedure: COMBINED ESOPHAGOSCOPY, GASTROSCOPY, DUODENOSCOPY (EGD), REMOVE FOREIGN BODY;;  Surgeon: González Metz MD;  [facility]     GYN SURGERY  1983    Multiple ectopic pregnancies, reconnect,       INSERT PORT VASCULAR ACCESS Right 2018    Procedure: INSERT PORT VASCULAR ACCESS;  Chest Port Placement - right;  Surgeon: Chanda Lawson PA-C;  [facility]     IR PORT REMOVAL RIGHT  2019     LAPAROSCOPY DIAGNOSTIC (GENERAL) N/A 2018    Procedure: LAPAROSCOPY DIAGNOSTIC (GENERAL);  Diagnostic Laparoscopy; Open Distal Pancreatectomy And Splenectomy, splenic flexure mobilization, cholecystectomy, intraoperative ultrasound;  Surgeon: Ja Byrd MD;  [facility]     PANCREATECTOMY, SPLENECTOMY N/A 2018    Procedure: PANCREATECTOMY, SPLENECTOMY;;  Surgeon: Ja Byrd MD;  [facility]     REMOVE PORT VASCULAR ACCESS Right 2019    Procedure: Right Port Removal;  Surgeon: Juan J Donaldson PA-C;  [facility]       FamH:   Family History   Problem Relation Age of Onset     Heart Disease Father      Hyperlipidemia Father      Heart Disease Brother      Diabetes Mother      Hypertension Mother      Obesity Mother      Depression Mother      Diabetes Maternal Grandfather      Hypertension Maternal Grandfather      Obesity Maternal Grandfather      Diabetes Sister      Hypertension Sister      Depression Sister      Anxiety Disorder Sister      Obesity Sister      Hypertension Brother      Hyperlipidemia  "Brother      Heart Disease Brother      Breast Cancer Cousin      Breast Cancer Cousin      Breast Cancer Cousin      Colon Cancer Other      Other Cancer Other         Mesothelioma     Depression Sister      Anxiety Disorder Brother      Anxiety Disorder Son      Depression Son      Mental Illness Sister         Schizophrenia     Hypertension Sister      Obesity Maternal Grandmother      Obesity Sister      Diabetes Nephew      Hypertension Brother        SocH:   Social History     Tobacco Use     Smoking status: Former Smoker     Packs/day: 0.50     Years: 5.00     Pack years: 2.50     Types: Cigarettes     Start date: 1974     Last attempt to quit: 1981     Years since quittin.6     Smokeless tobacco: Never Used   Substance Use Topics     Alcohol use: No     Alcohol/week: 12.6 oz     Comment: Now quit since Oct 31.  Moderate drinker in past     Drug use: No       Medications:   Current Outpatient Medications   Medication     acetaminophen (TYLENOL) 500 MG tablet     amylase-lipase-protease (CREON) 19715-22292 units CPEP per EC capsule     blood glucose monitoring (ONETOUCH VERIO IQ) test strip     ibuprofen (ADVIL/MOTRIN) 200 MG tablet     magic mouthwash (ENTER INGREDIENTS IN COMMENTS) suspension     metFORMIN (GLUCOPHAGE-XR) 500 MG 24 hr tablet     ONETOUCH DELICA LANCETS 33G MISC     pantoprazole (PROTONIX) 40 MG EC tablet     No current facility-administered medications for this visit.      Facility-Administered Medications Ordered in Other Visits   Medication     heparin 100 UNIT/ML injection 5 mL       Allergies:   No Known Allergies      Physical Exam:   /70   Pulse 79   Resp 18   Ht 1.67 m (5' 5.75\")   Wt 62.6 kg (138 lb)   BMI 22.44 kg/m       Constitutional: The patient was unaccompanied, well-groomed, and in no acute distress.    Head: Normocephalic and atraumatic.   Eyes: Pupils were equal and reactive.  Extraocular movement intact.    Ears: Pinnae and tragus non-tender.  EACs and " TMs were clear under microscopic exam.   Nose: Sinuses were non-tender.  Anterior rhinoscopy revealed midline septum and absence of purulence or polyps.    Mouth: Mucosa pink and moist, 6 mm ulcer in the left posterior buccal mucosa with surrounding erythema and leukoplakia, leukoplakia also noted in the retromolar trigone (see pictures),  tonsils non-erythematous, no exudates, uvula midline  Neck: No lymphadenopathy in the neck.  No palpable thyroid.  Normal range of motion  Respiratory: Breathing comfortably without stridor or exertion of accessory muscles.   Skin: Normal:  warm and pink without rash  Neurologic: Alert and oriented x 3.  CN's III-XII within normal limits.  Voice normal.   Psychiatric: The patient's affect was calm, cooperative, and appropriate.    Communication: Normal; communicates verbally, normal voice quality.                Fiberoptic Endoscopy:  Consent for fiberoptic laryngoscopy was obtained, and we confirmed correctness of procedure and identity of patient.  Fiberoptic laryngoscopy was indicated due to posterior tongue pain and report of sore.  The nose was topically decongested and anesthetized.  The fiberoptic laryngoscope was passed under endoscopic vision.  The turbinates were normal.  The inferior and middle meati were clear bilaterally without purulence, masses, or polyps.  The nasopharynx was clear.  The Eustachian tubes were clear.  The soft palate appeared normal with good mobility.  The epiglottis was sharp and the visualized portion of the vallecula was clear.  The larynx was clear with mobile cords.  The arytenoids were clear and there was no pooling in the hypopharynx.             PROCEDURE:  BIOPSY: After obtaining informed consent from Ms. Bangura, we put Ms. Bangura in a comfortable position. I injected the biopsy site with 1% lidocaine with 1:100,000 epinephrine. Once anesthesia was ascertained, I used a nasal cup forceps to sample the abnormal tissue.  1 sample were  taken and sent for permanent pathology.  Hemostasis was easily assured with pressure and silver nitrate stick. The patient tolerated the procedure well.          Assessment/Plan:   1. Mouth Sore, left buccal mucosa.    Concerning due to the look of the lesion and length of time present.  Also with associated leukoplakia.  Recommend biopsy in clinic today.  Discussed risk, including infection, bleeding, pain.  Patient agreed.  Cup biopsy taken and sent for pathology.    Patient aware it can take 5-7 working days for results.  I will call her with the results when they are back.  Depending on what the results are, I will get her either treated appropriately or referred to the appropriate surgeon.  We did discuss the differential diagnosis could include a viral etiology, infectious etiology such as yeast or malignancy.  Patient comfortable with plan.        Wanda Caldera PA-C  Otolaryngology  Head & Neck Surgery  [phone]      CC:  Solange Mcgill MD  [facility]

## 2019-08-08 NOTE — TELEPHONE ENCOUNTER
Spoke to individual who answered pts phone. Advised pts prescription was sent to the [facility] as her pharmacy was unable to fill it. Advised she could call clinic with any questions.

## 2019-08-08 NOTE — PATIENT INSTRUCTIONS
Kitty Bangura,    It was a pleasure to see you today.    1. You were seen in the ENT Clinic today by Wanda Caldera PA-C    If you have any questions or concerns after your appointment, please call   - Option 1: ENT Clinic: [phone]    2. Please continue to use the magic mouth wash as directed.    3.  I will call you with the biopsy results in about 1 week.    Follow up will be planned on the results from the biopsy      Thank you,  Wanda Caldera PA-C  Otolaryngology  Head & Neck Surgery  [phone]

## 2019-08-08 NOTE — TELEPHONE ENCOUNTER
[facility] Call Center    Phone Message    May a detailed message be left on voicemail: yes    Reason for Call: Other: Pharmacy called about the magic mouth wash, clarification one of the ingredients cant be ordered so they cant make the mouth wash. Or send to a compounding pharmacy please call to confirm     Action Taken: Other: ump ent

## 2019-08-08 NOTE — NURSING NOTE
"Chief Complaint   Patient presents with     Consult     recurring ,outh sore lower left side      Blood pressure 106/70, pulse 79, resp. rate 18, height 1.67 m (5' 5.75\"), weight 62.6 kg (138 lb), not currently breastfeeding.    Vito Mendez LPN    "

## 2019-08-13 DIAGNOSIS — K13.79 MOUTH SORE: Primary | ICD-10-CM

## 2019-08-13 LAB — COPATH REPORT: NORMAL

## 2019-08-13 RX ORDER — DEXAMETHASONE 0.5 MG/5ML
0.5 ELIXIR ORAL 4 TIMES DAILY
Qty: 200 ML | Refills: 0 | Status: SHIPPED | OUTPATIENT
Start: 2019-08-13 | End: 2019-08-29

## 2019-08-13 NOTE — PROGRESS NOTES
Reviewed the biopsy results with the patient.  She is aware that it is benign.  No comments about I can planus.  There is some inflammatory necrotic debris along with some granulation tissue and bacterial overgrowth.  We will treat with dexamethasone elixir 0.5 milligrams/5 mL, 5 mL gargled 4 times a day for 10 days.  I will see her back in about 14 days to recheck the area.  She will call with worsening symptoms.    If things are not improving, we may need to  refer her to an oral surgeon to potentially cut this out.    Patient verbalized understanding the plan.  She will call with questions or concerns.    Please schedule with me in about 14 days.      Wnada Caldera PA-C, 8/13/2019, 2:24 PM

## 2019-08-23 ENCOUNTER — MYC REFILL (OUTPATIENT)
Dept: ENDOCRINOLOGY | Facility: CLINIC | Age: 59
End: 2019-08-23

## 2019-08-23 DIAGNOSIS — Z79.4 TYPE 2 DIABETES MELLITUS WITH DIABETIC POLYNEUROPATHY, WITH LONG-TERM CURRENT USE OF INSULIN (H): ICD-10-CM

## 2019-08-23 DIAGNOSIS — E11.42 TYPE 2 DIABETES MELLITUS WITH DIABETIC POLYNEUROPATHY, WITH LONG-TERM CURRENT USE OF INSULIN (H): ICD-10-CM

## 2019-08-24 NOTE — TELEPHONE ENCOUNTER
"Diabetic Supplies Protocol Passed  Type 2  Clinic notes of 07/22/2019 per Dr Gallo:\"We discussed that it would be appropriate for her to follow up with primary care for diabetes at this point, and that she should return to endocrine clinic if any new questions or issues arise. She was comfortable with this plan.\"  Follow up appt with PCP currently scheduled: 11/04/2019.  Barby Conroy RN on 8/24/2019 at 9:10 AM     "

## 2019-08-29 ENCOUNTER — OFFICE VISIT (OUTPATIENT)
Dept: OTOLARYNGOLOGY | Facility: CLINIC | Age: 59
End: 2019-08-29
Payer: COMMERCIAL

## 2019-08-29 VITALS
SYSTOLIC BLOOD PRESSURE: 112 MMHG | HEIGHT: 66 IN | HEART RATE: 66 BPM | RESPIRATION RATE: 15 BRPM | WEIGHT: 136 LBS | DIASTOLIC BLOOD PRESSURE: 76 MMHG | BODY MASS INDEX: 21.86 KG/M2

## 2019-08-29 DIAGNOSIS — K12.1 STOMATITIS AND MUCOSITIS: Primary | ICD-10-CM

## 2019-08-29 DIAGNOSIS — K12.30 STOMATITIS AND MUCOSITIS: Primary | ICD-10-CM

## 2019-08-29 ASSESSMENT — MIFFLIN-ST. JEOR: SCORE: 1205.89

## 2019-08-29 ASSESSMENT — PAIN SCALES - GENERAL: PAINLEVEL: NO PAIN (0)

## 2019-08-29 NOTE — LETTER
"8/29/2019       RE: Kitty Bangura  [id number] [id number]     Dear Colleague,    Thank you for referring your patient, Kitty Bangura, to the [facility] EAR NOSE AND THROAT at [facility]. Please see a copy of my visit note below.    [facility]ose [facility] Follow Up Visit Note  Otolaryngology    August 29, 2019       HPI:  Kitty Bangura is a 58 year old female who presents for follow up on her mouth sores.  We did a biopsy which was negative for malignancy, but showed inflammation and necrotic debris.  I placed her on dexamethasone elixir after the biopsy results came back.    Kitty reports that initially, when she started the dexamethasone elixir, symptoms improved drastically.  Unfortunately, she started to get some lower abdominal cramping and pain, as well as some mild hyperglycemia, and she felt it was related to the dexamethasone elixir so she stopped it.  Within 2 to 3 days after stopping it, the sore started to come back.  Then, she ate some sausage this past Monday, 8/26, and it got quite painful since that time.  She does not necessarily feel that it is any larger, but the pain is getting to be about as painful as what it was when she first saw me.    The site where we did the biopsy has healed and there is no open sore.  She is just unsure of what to do next because it still quite uncomfortable when she eats.  She has not had to use the liquid oxycodone she has from her previous prescriptions she had during chemo as of yet, but she has it there if needed.  She has been using some Magic mouthwash intermittently.  The salt and soda gargles seem to help even more than that.  She also has viscous lidocaine at home that she could use.      REVIEW OF SYSTEMS:  10 point ROS was negative other than the symptoms noted above in the HPI.    Physical Exam:  /76   Pulse 66   Resp 15   Ht 1.68 m (5' 6.14\")   Wt 61.7 kg (136 lb)   " BMI 21.86 kg/m       Constitutional: The patient was unaccompanied, well-groomed, and in no acute distress.    Mouth: Mucosa pink and moist, 1 cm area of leukoplakia and stomatitis in the left buccal mucosa, no palpable mass underlying the area, tenderness to palpation present  Neck: No lymphadenopathy in the neck.  No palpable thyroid.  Normal range of motion  Neurologic: Alert and oriented x 3.  CN's III-XII within normal limits.  Voice normal.   Psychiatric: The patient's affect was calm, cooperative, and appropriate.          Surgical pathology report from 8/8/3019:   INTERPRETATION:   GMS stain with appropriate control is negative for fungal organism.     ORIGINAL REPORT:     SPECIMEN(S):   Left buccal mucosa, posterior     FINAL DIAGNOSIS:   Left buccal mucosa, posterior:        - Benign squamous mucosa with granulation tissue, bacterial overgrowth, inflammatory and necrotic debris       Assessment/Plan:   1.  Stomatitis, left buccal mucosa.  Patient with ongoing symptoms of stomatitis despite use of salt and soda gargles and dexamethasone gargle.  Previous biopsy was negative for malignancy but did show some inflammatory necrotic debilities along with some bacterial overgrowth.    Patient did have initially have some improvement with the dexamethasone rinse, however, she started to have some side effects from this so stopped that.  Since stopping it, symptoms have persisted and almost back to where they were prior to her coming to visit me.    At this point, I think it might be helpful for her to see Dr. Ward to discuss the possibility of excising this.  She likely has a bacterial biofilm that making it difficult for us to have this area heal despite what were doing.    I will have her continue the salt and soda gargles, start some Biotene mouth rinse and then have her use the viscous lidocaine topically on an as-needed basis.    She will call between this appointment and the appointment with Dr. Ward if  symptoms worsen or she needs medication refills.        Wanda Caldera PA-C  Otolaryngology  Head & Neck Surgery  [phone]      CC:  Solange Mcgill MD  [facility]    Enrique Ward MD  [place] [id number]

## 2019-08-29 NOTE — NURSING NOTE
"Chief Complaint   Patient presents with     RECHECK     follow up -oral ulcer      Blood pressure 112/76, pulse 66, resp. rate 15, height 1.68 m (5' 6.14\"), weight 61.7 kg (136 lb), not currently breastfeeding.    Vito Mendez LPN    "

## 2019-08-29 NOTE — PROGRESS NOTES
"[facility] Ear, Nose and Thro[facility] Clinic Follow Up Visit Note  Otolaryngology    August 29, 2019       HPI:  Kitty Bangura is a 58 year old female who presents for follow up on her mouth sores.  We did a biopsy which was negative for malignancy, but showed inflammation and necrotic debris.  I placed her on dexamethasone elixir after the biopsy results came back.    Kitty reports that initially, when she started the dexamethasone elixir, symptoms improved drastically.  Unfortunately, she started to get some lower abdominal cramping and pain, as well as some mild hyperglycemia, and she felt it was related to the dexamethasone elixir so she stopped it.  Within 2 to 3 days after stopping it, the sore started to come back.  Then, she ate some sausage this past Monday, 8/26, and it got quite painful since that time.  She does not necessarily feel that it is any larger, but the pain is getting to be about as painful as what it was when she first saw me.    The site where we did the biopsy has healed and there is no open sore.  She is just unsure of what to do next because it still quite uncomfortable when she eats.  She has not had to use the liquid oxycodone she has from her previous prescriptions she had during chemo as of yet, but she has it there if needed.  She has been using some Magic mouthwash intermittently.  The salt and soda gargles seem to help even more than that.  She also has viscous lidocaine at home that she could use.      REVIEW OF SYSTEMS:  10 point ROS was negative other than the symptoms noted above in the HPI.    Physical Exam:  /76   Pulse 66   Resp 15   Ht 1.68 m (5' 6.14\")   Wt 61.7 kg (136 lb)   BMI 21.86 kg/m      Constitutional: The patient was unaccompanied, well-groomed, and in no acute distress.    Mouth: Mucosa pink and moist, 1 cm area of leukoplakia and stomatitis in the left buccal mucosa, no palpable mass underlying the area, tenderness to palpation present  Neck: No " lymphadenopathy in the neck.  No palpable thyroid.  Normal range of motion  Neurologic: Alert and oriented x 3.  CN's III-XII within normal limits.  Voice normal.   Psychiatric: The patient's affect was calm, cooperative, and appropriate.          Surgical pathology report from 8/8/3019:   INTERPRETATION:   GMS stain with appropriate control is negative for fungal organism.     ORIGINAL REPORT:     SPECIMEN(S):   Left buccal mucosa, posterior     FINAL DIAGNOSIS:   Left buccal mucosa, posterior:        - Benign squamous mucosa with granulation tissue, bacterial overgrowth, inflammatory and necrotic debris       Assessment/Plan:   1.  Stomatitis, left buccal mucosa.  Patient with ongoing symptoms of stomatitis despite use of salt and soda gargles and dexamethasone gargle.  Previous biopsy was negative for malignancy but did show some inflammatory necrotic debilities along with some bacterial overgrowth.    Patient did have initially have some improvement with the dexamethasone rinse, however, she started to have some side effects from this so stopped that.  Since stopping it, symptoms have persisted and almost back to where they were prior to her coming to visit me.    At this point, I think it might be helpful for her to see Dr. Ward to discuss the possibility of excising this.  She likely has a bacterial biofilm that making it difficult for us to have this area heal despite what were doing.    I will have her continue the salt and soda gargles, start some Biotene mouth rinse and then have her use the viscous lidocaine topically on an as-needed basis.    She will call between this appointment and the appointment with Dr. Ward if symptoms worsen or she needs medication refills.        Wanda Caldera PA-C  Otolaryngology  Head & Neck Surgery  [phone]      CC:  Solange Mcgill MD  [facility]    Enrique Ward MD  [facility] [address]

## 2019-08-30 NOTE — TELEPHONE ENCOUNTER
FUTURE VISIT INFORMATION      FUTURE VISIT INFORMATION:    Date: 9/17/19    Time: 1:30 pm    Location: [facility]  REFERRAL INFORMATION:    Referring provider:  DANTE Crocker    Referring providers clinic:  [facility] ENT    Reason for visit/diagnosis  Stomatitis and Mucositis, discuss surgery    RECORDS REQUESTED FROM:       Clinic name Comments Records Status Imaging Status   [facility] ENT Office Visit-8/29/19, 8/8/19-DANTE Crocker [facility] Office Visit-4/30/19-Dr. Solange Zelaya    [facility] Office Visit-7/23/18-Kellie Zelaya

## 2019-09-17 ENCOUNTER — PRE VISIT (OUTPATIENT)
Dept: OTOLARYNGOLOGY | Facility: CLINIC | Age: 59
End: 2019-09-17

## 2019-09-17 ENCOUNTER — OFFICE VISIT (OUTPATIENT)
Dept: OTOLARYNGOLOGY | Facility: CLINIC | Age: 59
End: 2019-09-17
Attending: PHYSICIAN ASSISTANT
Payer: COMMERCIAL

## 2019-09-17 VITALS
SYSTOLIC BLOOD PRESSURE: 113 MMHG | HEART RATE: 75 BPM | WEIGHT: 137 LBS | BODY MASS INDEX: 22.02 KG/M2 | DIASTOLIC BLOOD PRESSURE: 68 MMHG

## 2019-09-17 DIAGNOSIS — K12.1 STOMATITIS AND MUCOSITIS: Primary | ICD-10-CM

## 2019-09-17 DIAGNOSIS — K12.30 STOMATITIS AND MUCOSITIS: Primary | ICD-10-CM

## 2019-09-17 RX ORDER — CLOBETASOL PROPIONATE 0.05 MG/G
GEL TOPICAL 2 TIMES DAILY
Qty: 15 G | Refills: 1 | Status: SHIPPED | OUTPATIENT
Start: 2019-09-17 | End: 2019-12-27

## 2019-09-17 ASSESSMENT — PAIN SCALES - GENERAL: PAINLEVEL: MODERATE PAIN (4)

## 2019-09-17 NOTE — PROGRESS NOTES
HISTORY OF PRESENT ILLNESS: Kitty Bangura is a 58 year old female with a history of  Left sided tongue acute ulcer. This may be affetced somewhat by diet but waxes and wanes and aprear a bit more crescendo effect ,   All on the left side. No other current complaints.     Last 2 Scores for Patient-Answered VHI Questionnaire  No flowsheet data found.    Last 2 Scores for Patient-Answered CSI Questionnaire  No flowsheet data found.      Last 2 Scores for Patient-Answered EAT Questionnaire  No flowsheet data found.        PAST MEDICAL HISTORY:   Past Medical History:   Diagnosis Date     Arthritis Post chemo    Suspected     Diabetes (H)      Necrotizing pancreatitis      Pancreatic cancer (H)      Pneumonia     2016     PONV (postoperative nausea and vomiting)        PAST SURGICAL HISTORY:   Past Surgical History:   Procedure Laterality Date     ABDOMEN SURGERY  1983    Ruptured tubal pregnancies, additional surgeries followed     BACK SURGERY  2004    L5, S1 discectomy     BREAST SURGERY  2003    Breast reduction     COLONOSCOPY  2008     COLONOSCOPY N/A 12/11/2018    Procedure: colonoscopy;  Surgeon: Lobito Marte MD;  Location: [facility]     ENDOSCOPIC RETROGRADE CHOLANGIOPANCREATOGRAM N/A 1/25/2018    Procedure: COMBINED ENDOSCOPIC RETROGRADE CHOLANGIOPANCREATOGRAPHY, PLACE TUBE/STENT;  Endoscopic retrograde cholangiopancreatogram with stent placement and cyst drainage bile ;  Surgeon: Vito Sanches MD;  Location: [facility]     ENDOSCOPIC ULTRASOUND LOWER GASTROINTESTIONAL TRACT (GI) N/A 1/25/2018    Procedure: ENDOSCOPIC ULTRASOUND LOWER GASTROINTESTIONAL TRACT (GI);  Endoscopic Ultrasound with guided Cyst-Gastrostomy;  Surgeon: González Metz MD;  Location: [facility] OR     ENDOSCOPIC ULTRASOUND, ESOPHAGOSCOPY, GASTROSCOPY, DUODENOSCOPY (EGD), NECROSECTOMY N/A 12/4/2017    Procedure: ENDOSCOPIC ULTRASOUND, ESOPHAGOSCOPY, GASTROSCOPY, DUODENOSCOPY (EGD), NECROSECTOMY;  Esophagogastroduodenoscopy, with   Necrosectomy and stents replacement  ;  Surgeon: González Metz MD;  Location: [facility]     ENDOSCOPIC ULTRASOUND, ESOPHAGOSCOPY, GASTROSCOPY, DUODENOSCOPY (EGD), NECROSECTOMY N/A 2017    Procedure: ENDOSCOPIC ULTRASOUND, ESOPHAGOSCOPY, GASTROSCOPY, DUODENOSCOPY (EGD), NECROSECTOMY;  Esophagogastroduodenoscopy with Necrosectomy, Balloon Dilation, stent removal X3 and Cystgastrostomy stent Placement X2;  Surgeon: Guru Cecilia Staples MD;  Location: [facility]     ESOPHAGOSCOPY, GASTROSCOPY, DUODENOSCOPY (EGD), COMBINED N/A 2017    Procedure: COMBINED ENDOSCOPIC ULTRASOUND, ESOPHAGOSCOPY, GASTROSCOPY, DUODENOSCOPY (EGD), FINE NEEDLE ASPIRATE/BIOPSY;  Endoscopic Ultrasound with cystgastrostomy and fine needle aspiration ;  Surgeon: González Metz MD;  Location: [facility]     ESOPHAGOSCOPY, GASTROSCOPY, DUODENOSCOPY (EGD), COMBINED N/A 2018    Procedure: COMBINED ESOPHAGOSCOPY, GASTROSCOPY, DUODENOSCOPY (EGD);  EGD;  Surgeon: González Metz MD;  Location:  GI     ESOPHAGOSCOPY, GASTROSCOPY, DUODENOSCOPY (EGD), COMBINED N/A 2018    Procedure: COMBINED ESOPHAGOSCOPY, GASTROSCOPY, DUODENOSCOPY (EGD), REMOVE FOREIGN BODY;;  Surgeon: González Metz MD;  Location:  GI     GYN SURGERY  1983    Multiple ectopic pregnancies, reconnect,  1988     INSERT PORT VASCULAR ACCESS Right 2018    Procedure: INSERT PORT VASCULAR ACCESS;  Chest Port Placement - right;  Surgeon: Chanda Lawson PA-C;  Location: [facility]     IR PORT REMOVAL RIGHT  2019     LAPAROSCOPY DIAGNOSTIC (GENERAL) N/A 2018    Procedure: LAPAROSCOPY DIAGNOSTIC (GENERAL);  Diagnostic Laparoscopy; Open Distal Pancreatectomy And Splenectomy, splenic flexure mobilization, cholecystectomy, intraoperative ultrasound;  Surgeon: Ja Byrd MD;  Location: [facility]     PANCREATECTOMY, SPLENECTOMY N/A 2018    Procedure: PANCREATECTOMY, SPLENECTOMY;;  Surgeon: Ja Byrd MD;  Location: [facility]      REMOVE PORT VASCULAR ACCESS Right 2019    Procedure: Right Port Removal;  Surgeon: Juan J Donaldson PA-C;  Location:  OR       FAMILY HISTORY:   Family History   Problem Relation Age of Onset     Heart Disease Father      Hyperlipidemia Father      Heart Disease Brother      Diabetes Mother      Hypertension Mother      Obesity Mother      Depression Mother      Diabetes Maternal Grandfather      Hypertension Maternal Grandfather      Obesity Maternal Grandfather      Diabetes Sister      Hypertension Sister      Depression Sister      Anxiety Disorder Sister      Obesity Sister      Hypertension Brother      Hyperlipidemia Brother      Heart Disease Brother      Breast Cancer Cousin      Breast Cancer Cousin      Breast Cancer Cousin      Colon Cancer Other      Other Cancer Other         Mesothelioma     Depression Sister      Anxiety Disorder Brother      Anxiety Disorder Son      Depression Son      Mental Illness Sister         Schizophrenia     Hypertension Sister      Obesity Maternal Grandmother      Obesity Sister      Diabetes Nephew      Hypertension Brother        SOCIAL HISTORY:   Social History     Tobacco Use     Smoking status: Former Smoker     Packs/day: 0.50     Years: 5.00     Pack years: 2.50     Types: Cigarettes     Start date: 1974     Last attempt to quit: 1981     Years since quittin.7     Smokeless tobacco: Never Used   Substance Use Topics     Alcohol use: No     Alcohol/week: 12.6 oz     Comment: Now quit since Oct 31.  Moderate drinker in past       REVIEW OF SYSTEMS: Ten point review of systems was performed and is negative except for:    ENT ROS 9/3/2019   Constitutional Problems with sleep   Neurology -   Ears, Nose, Throat Ear pain, Ringing/noise in ears, Sore throat, Trouble swallowing, Hoarseness   Cardiopulmonary Cough   Gastrointestinal/Genitourinary -   Musculoskeletal Sore or stiff joints   Hematologic -   Endocrine Thirst, Heat or cold intolerance,  Frequent urination        ALLERGIES: Patient has no known allergies.    MEDICATIONS:   Current Outpatient Medications   Medication Sig Dispense Refill     acetaminophen (TYLENOL) 500 MG tablet Take 2 tablets (1,000 mg) by mouth every 8 hours 100 tablet 1     amylase-lipase-protease (CREON) 43836-18361 units CPEP per EC capsule Take 2-3 with meals / 1-2 with snacks, up to 15 per day. 450 capsule 6     blood glucose (ONETOUCH VERIO IQ) test strip Use to test blood sugar 4 times daily or as directed. 400 each 3     clobetasol (TEMOVATE) 0.05 % GEL topical gel Apply topically 2 times daily 15 g 1     ibuprofen (ADVIL/MOTRIN) 200 MG tablet Take 200 mg by mouth every 4 hours as needed for mild pain       metFORMIN (GLUCOPHAGE-XR) 500 MG 24 hr tablet Take 1 tablet 3 times a day with meals 270 tablet 3     ONETOUCH DELICA LANCETS 33G MISC 4 lancets daily 100 each 3     magic mouthwash (ENTER INGREDIENTS IN COMMENTS) suspension Swish and spit 5 ml every 8 hours for 14 days (Patient not taking: Reported on 8/29/2019) 1000 mL 3         PHYSICAL EXAMINATION:  She  is awake, alert and in no apparent distress.    Her tympanic membranes are clear and intact bilaterally. External auditory canals are clear.  Nasal exam shows a mild septal deviation without obstruction.  Examination of the oral cavity shows no suspicious lesions.  However there is a buccal area and a left tongue area that almost appear with Shelly striawe.  There is symmetric movement of the tongue and soft palate.    The oropharynx is clear.  Her neck is supple without significant adenopathy.  Pulse is regular.  Upper airway is clear.  Cranial nerves II-XII are grossly intact.              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     Will treat with clobetasol and re check her in about 6 weeks 21 bid clobetasol.     Enrique Ward MD

## 2019-09-17 NOTE — NURSING NOTE
Chief Complaint   Patient presents with     Consult     Stomatitis and mucositis     Blood pressure 113/68, pulse 75, weight 62.1 kg (137 lb), not currently breastfeeding.    Nunu Coronado, EMT

## 2019-09-17 NOTE — LETTER
9/17/2019       RE: Kitty Bangura  [address] [address] [place] [id number]     Dear Colleague,    Thank you for referring your patient, Kitty Bangura, to the [facility] EAR NOSE AND THROAT at [facility]. Please see a copy of my visit note below.    HISTORY OF PRESENT ILLNESS: Kitty Bangura is a 58 year old female with a history of  Left sided tongue acute ulcer. This may be affetced somewhat by diet but waxes and wanes and aprear a bit more crescendo effect ,   All on the left side. No other current complaints.     Last 2 Scores for Patient-Answered VHI Questionnaire  No flowsheet data found.    Last 2 Scores for Patient-Answered CSI Questionnaire  No flowsheet data found.      Last 2 Scores for Patient-Answered EAT Questionnaire  No flowsheet data found.        PAST MEDICAL HISTORY:   Past Medical History:   Diagnosis Date     Arthritis Post chemo    Suspected     Diabetes (H)      Necrotizing pancreatitis      Pancreatic cancer (H)      Pneumonia     2016     PONV (postoperative nausea and vomiting)        PAST SURGICAL HISTORY:   Past Surgical History:   Procedure Laterality Date     ABDOMEN SURGERY  1983    Ruptured tubal pregnancies, additional surgeries followed     BACK SURGERY  2004    L5, S1 discectomy     BREAST SURGERY  2003    Breast reduction     COLONOSCOPY  2008     COLONOSCOPY N/A 12/11/2018    Procedure: colonoscopy;  Surgeon: Lobito Marte MD;  Location: [facility]     ENDOSCOPIC RETROGRADE CHOLANGIOPANCREATOGRAM N/A 1/25/2018    Procedure: COMBINED ENDOSCOPIC RETROGRADE CHOLANGIOPANCREATOGRAPHY, PLACE TUBE/STENT;  Endoscopic retrograde cholangiopancreatogram with stent placement and cyst drainage bile ;  Surgeon: Vito Sanches MD;  [facility]: [facility]     ENDOSCOPIC ULTRASOUND LOWER GASTROINTESTIONAL TRACT (GI) N/A 1/25/2018    Procedure: ENDOSCOPIC ULTRASOUND LOWER GASTROINTESTIONAL TRACT (GI);  Endoscopic Ultrasound with guided  Cyst-Gastrostomy;  Surgeon: González Metz MD;  Location: [facility]     ENDOSCOPIC ULTRASOUND, ESOPHAGOSCOPY, GASTROSCOPY, DUODENOSCOPY (EGD), NECROSECTOMY N/A 2017    Procedure: ENDOSCOPIC ULTRASOUND, ESOPHAGOSCOPY, GASTROSCOPY, DUODENOSCOPY (EGD), NECROSECTOMY;  Esophagogastroduodenoscopy, with  Necrosectomy and stents replacement  ;  Surgeon: González Metz MD;  [facility]: [facility]     ENDOSCOPIC ULTRASOUND, ESOPHAGOSCOPY, GASTROSCOPY, DUODENOSCOPY (EGD), NECROSECTOMY N/A 2017    Procedure: ENDOSCOPIC ULTRASOUND, ESOPHAGOSCOPY, GASTROSCOPY, DUODENOSCOPY (EGD), NECROSECTOMY;  Esophagogastroduodenoscopy with Necrosectomy, Balloon Dilation, stent removal X3 and Cystgastrostomy stent Placement X2;  Surgeon: Guru Cecilia Staples MD;  [facility]: [facility]     ESOPHAGOSCOPY, GASTROSCOPY, DUODENOSCOPY (EGD), COMBINED N/A 2017    Procedure: COMBINED ENDOSCOPIC ULTRASOUND, ESOPHAGOSCOPY, GASTROSCOPY, DUODENOSCOPY (EGD), FINE NEEDLE ASPIRATE/BIOPSY;  Endoscopic Ultrasound with cystgastrostomy and fine needle aspiration ;  Surgeon: González Metz MD;  [facility]     ESOPHAGOSCOPY, GASTROSCOPY, DUODENOSCOPY (EGD), COMBINED N/A 2018    Procedure: COMBINED ESOPHAGOSCOPY, GASTROSCOPY, DUODENOSCOPY (EGD);  EGD;  Surgeon: González Metz MD;  Location:  GI     ESOPHAGOSCOPY, GASTROSCOPY, DUODENOSCOPY (EGD), COMBINED N/A 2018    Procedure: COMBINED ESOPHAGOSCOPY, GASTROSCOPY, DUODENOSCOPY (EGD), REMOVE FOREIGN BODY;;  Surgeon: González Metz MD;  Location:  GI     GYN SURGERY  1983    Multiple ectopic pregnancies, reconnect,  1988     INSERT PORT VASCULAR ACCESS Right 2018    Procedure: INSERT PORT VASCULAR ACCESS;  Chest Port Placement - right;  Surgeon: Chanda Lawson PA-C;  [facility]     IR PORT REMOVAL RIGHT  2019     LAPAROSCOPY DIAGNOSTIC (GENERAL) N/A 2018    Procedure: LAPAROSCOPY DIAGNOSTIC (GENERAL);  Diagnostic  Laparoscopy; Open Distal Pancreatectomy And Splenectomy, splenic flexure mobilization, cholecystectomy, intraoperative ultrasound;  Surgeon: Ja Byrd MD;  [facility]     PANCREATECTOMY, SPLENECTOMY N/A 2018    Procedure: PANCREATECTOMY, SPLENECTOMY;;  Surgeon: Ja Byrd MD;  [facility]     REMOVE PORT VASCULAR ACCESS Right 2019    Procedure: Right Port Removal;  Surgeon: Juan J Donaldson PA-C;  [facility]       FAMILY HISTORY:   Family History   Problem Relation Age of Onset     Heart Disease Father      Hyperlipidemia Father      Heart Disease Brother      Diabetes Mother      Hypertension Mother      Obesity Mother      Depression Mother      Diabetes Maternal Grandfather      Hypertension Maternal Grandfather      Obesity Maternal Grandfather      Diabetes Sister      Hypertension Sister      Depression Sister      Anxiety Disorder Sister      Obesity Sister      Hypertension Brother      Hyperlipidemia Brother      Heart Disease Brother      Breast Cancer Cousin      Breast Cancer Cousin      Breast Cancer Cousin      Colon Cancer Other      Other Cancer Other         Mesothelioma     Depression Sister      Anxiety Disorder Brother      Anxiety Disorder Son      Depression Son      Mental Illness Sister         Schizophrenia     Hypertension Sister      Obesity Maternal Grandmother      Obesity Sister      Diabetes Nephew      Hypertension Brother        SOCIAL HISTORY:   Social History     Tobacco Use     Smoking status: Former Smoker     Packs/day: 0.50     Years: 5.00     Pack years: 2.50     Types: Cigarettes     Start date: 1974     Last attempt to quit: 1981     Years since quittin.7     Smokeless tobacco: Never Used   Substance Use Topics     Alcohol use: No     Alcohol/week: 12.6 oz     Comment: Now quit since Oct 31.  Moderate drinker in past       REVIEW OF SYSTEMS: Ten point review of systems was performed and is negative except for:   [facility] ROS  9/3/2019   Constitutional Problems with sleep   Neurology -   Ears, Nose, Throat Ear pain, Ringing/noise in ears, Sore throat, Trouble swallowing, Hoarseness   Cardiopulmonary Cough   Gastrointestinal/Genitourinary -   Musculoskeletal Sore or stiff joints   Hematologic -   Endocrine Thirst, Heat or cold intolerance, Frequent urination        ALLERGIES: Patient has no known allergies.    MEDICATIONS:   Current Outpatient Medications   Medication Sig Dispense Refill     acetaminophen (TYLENOL) 500 MG tablet Take 2 tablets (1,000 mg) by mouth every 8 hours 100 tablet 1     amylase-lipase-protease (CREON) 56269-78303 units CPEP per EC capsule Take 2-3 with meals / 1-2 with snacks, up to 15 per day. 450 capsule 6     blood glucose (ONETOUCH VERIO IQ) test strip Use to test blood sugar 4 times daily or as directed. 400 each 3     clobetasol (TEMOVATE) 0.05 % GEL topical gel Apply topically 2 times daily 15 g 1     ibuprofen (ADVIL/MOTRIN) 200 MG tablet Take 200 mg by mouth every 4 hours as needed for mild pain       metFORMIN (GLUCOPHAGE-XR) 500 MG 24 hr tablet Take 1 tablet 3 times a day with meals 270 tablet 3     ONETOUCH DELICA LANCETS 33G MISC 4 lancets daily 100 each 3     magic mouthwash (ENTER INGREDIENTS IN COMMENTS) suspension Swish and spit 5 ml every 8 hours for 14 days (Patient not taking: Reported on 8/29/2019) 1000 mL 3         PHYSICAL EXAMINATION:  She  is awake, alert and in no apparent distress.    Her tympanic membranes are clear and intact bilaterally. External auditory canals are clear.  Nasal exam shows a mild septal deviation without obstruction.  Examination of the oral cavity shows no suspicious lesions.  However there is a buccal area and a left tongue area that almost appear with Shelly striawe.  There is symmetric movement of the tongue and soft palate.    The oropharynx is clear.  Her neck is supple without significant adenopathy.  Pulse is regular.  Upper airway is clear.  Cranial nerves  II-XII are grossly intact.              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     Will treat with clobetasol and re check her in about 6 weeks 21 bid clobetasol.     Enrique Ward MD

## 2019-09-17 NOTE — PATIENT INSTRUCTIONS
1. You were seen in the ENT Clinic today by Dr. Ward.  If you have any questions or concerns after your appointment, please call   - Option 1: ENT Clinic: [phone]  - Option 2: Ernie (Dr. Ward's Nurse): [phone]    2.   Plan to return to clinic in 8-9 weeks; 11/19 at 1030    3. Use prescribed ointment twice daily x2 weeks    Natice Schwab, RN  [facility] Otolaryngology  [phone]

## 2019-09-30 ENCOUNTER — HOSPITAL ENCOUNTER (OUTPATIENT)
Dept: CT IMAGING | Facility: CLINIC | Age: 59
Discharge: HOME | End: 2019-09-30
Attending: INTERNAL MEDICINE | Admitting: INTERNAL MEDICINE
Payer: COMMERCIAL

## 2019-09-30 ENCOUNTER — APPOINTMENT (OUTPATIENT)
Dept: LAB | Facility: CLINIC | Age: 59
End: 2019-09-30
Payer: COMMERCIAL

## 2019-09-30 DIAGNOSIS — C25.9 MALIGNANT NEOPLASM OF PANCREAS, UNSPECIFIED LOCATION OF MALIGNANCY (H): ICD-10-CM

## 2019-09-30 LAB
CREAT BLD-MCNC: 0.7 MG/DL (ref 0.52–1.04)
GFR SERPL CREATININE-BSD FRML MDRD: 85 ML/MIN/{1.73_M2}

## 2019-09-30 PROCEDURE — 86301 IMMUNOASSAY TUMOR CA 19-9: CPT | Performed by: INTERNAL MEDICINE

## 2019-09-30 PROCEDURE — 25000128 H RX IP 250 OP 636: Performed by: RADIOLOGY

## 2019-09-30 PROCEDURE — 74177 CT ABD & PELVIS W/CONTRAST: CPT

## 2019-09-30 PROCEDURE — 71260 CT THORAX DX C+: CPT

## 2019-09-30 PROCEDURE — 82565 ASSAY OF CREATININE: CPT

## 2019-09-30 PROCEDURE — 25000125 ZZHC RX 250: Performed by: RADIOLOGY

## 2019-09-30 RX ORDER — IOPAMIDOL 755 MG/ML
67 INJECTION, SOLUTION INTRAVASCULAR ONCE
Status: COMPLETED | OUTPATIENT
Start: 2019-09-30 | End: 2019-09-30

## 2019-09-30 RX ADMIN — SODIUM CHLORIDE 57 ML: 9 INJECTION, SOLUTION INTRAVENOUS at 09:32

## 2019-09-30 RX ADMIN — IOPAMIDOL 67 ML: 755 INJECTION, SOLUTION INTRAVENOUS at 09:31

## 2019-10-01 ENCOUNTER — MYC REFILL (OUTPATIENT)
Dept: GASTROENTEROLOGY | Facility: CLINIC | Age: 59
End: 2019-10-01

## 2019-10-01 DIAGNOSIS — K85.91 NECROTIZING PANCREATITIS: ICD-10-CM

## 2019-10-01 LAB — CANCER AG19-9 SERPL-ACNC: 3 U/ML (ref 0–37)

## 2019-10-04 ENCOUNTER — HEALTH MAINTENANCE LETTER (OUTPATIENT)
Age: 59
End: 2019-10-04

## 2019-10-04 NOTE — PROGRESS NOTES
Oncology Follow-up Visit:  Oct 7, 2019    Cancer diagnosis: cystic pancreatic tail adenocarcinoma  small subcentimeter pulmonary nodules seen on staging scans of unclear etiology.     Treatment to date: neoadjuvant FOLFIRINOX x4 cycles, 1/15/18-3/6/18  Difficulty tolerating neoadjuvant intent chemotherapy. Distal pancreatectomy performed April 17, 2018, no residual carcinoma seen on operative pathology.  Treated with four cycles of adjuvant chemotherapy with gemcitabine and Xeloda, completed September 2018.     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     Referring physician: Dr. González Metz MD      Oncology History: She was previously healthy until she presented in early November 2017 with abdominal pain. CT abdomen on 11/1/17 showed acute pancreatitis (lipase 41,960) and a 3cm heterogenous pancreatic tail mass. The etiology of pancreatitis is unclear - no obstructing gallstone and not a heavy drinker. She was hospitalized for one week and followed up with Dr. González Metz in GI clinic.     11/29/17 -- endoscopic drainage of walled-off area of pancreatic necrosis by Dr. Metz. During the same procedure she had an EUS FNA of the pancreatic tail mass and pathology showed adenocarcinoma. The mass measured 33 x 27 mm, encapsulated with solid and cystic components, rim calcification, and no evidence of invasion.     12/9/17 -- Staging CT chest/abd/pelvis showed several small pulmonary nodules (largest 3mm), unchanged mass in the pancreatic tail, mildly prominent mesenteric nodes thought likely reactive, and small right hepatic lobe hypodensities. Abdominal MRI on 12/10/17 showed hepatic hemangiomas, no evidence of metastatic disease to the liver.     1/8/18 - - oncology consultation, Dr. Monk. Recommendation: neoadjuvant intent  chemotherapy with FOLFIRINOX.    1/15/18 - - cycle 1/day one FOLFIRINOX chemotherapy.    1/20 through 1/27/18 - - hospitalization at the [facility], for acute pancreatitis. Following three days of nausea, vomiting, pain. During this hospitalization: ERCP was attempted by Dr. Sanches with pancreatic stent placement, but pancreatic duct was completely obstructed and wire was unable to pass beyond the duct. Dr. Metz performed successful US guided cyst gastrostomy January 25, 2018.    1/31/18 - - oncology follow-up, DANTE Talavera. Neutropenic with ANC 0.4, thus defer cycle two of chemotherapy.    2/5/18 - - oncology follow-up, DANTE Junior.  Cycle 2/day one FOLFIRINOX chemotherapy. Patient endorses cold sensitivity secondary to oxaliplatin. Neulasta given for growth factor support. Due to significant neutropenia with cycle one.    2/11/18 - - ER evaluation for epigastric pain. Discharged to home from ER. Leukocytosis secondary to Neulasta given on February 8.    2/20/18 - - oncology follow-up, DANTE Junior. Cycle 3/day one FOLFIRINOX given without Neulasta. At patient request, oxaliplatin dose reduced by 10% due to neuropathy/cold sensitivity.    3/6/18 - - oncology follow-up, DANTE Talavera. Cycle 4/day one FOLFIRINOX chemotherapy.    3/13/18 - - CT chest/abdomen/pelvis - - IMPRESSION: 1. Minimal decrease in size and marked decrease in enhancement and pancreatic mass since the comparison study. 2. No significant change in low dense lesions in the liver since comparison.    3/16/18 - - oncology follow-up Dr. Monk. Plan is to hold on further chemotherapy as surgery is scheduled for 4/17/18 4/17/18 - - surgery: PROCEDURE: Diagnostic laparoscopy, splenic flexure mobilization, intraoperative ultrasound, distal pancreatectomy, splenectomy, and cholecystectomy. SURGEON: Ja Byrd MD  Final surgical pathology showed necrotizing pancreatitis, no evidence of residual  cancer, complete pathologic response, 26 benign lymph nodes.  FINAL DIAGNOSIS: A. DISTAL PANCREAS, SPLEEN AND OMENTUM, RESECTION: - Necrotizing pancreatitis with residual acellular mucin and foci of high grade dysplasia, status post neoadjuvant therapy   - Treatment response: complete (score 0)   - Twenty-six benign lymph nodes (0/26)   - Negative for residual carcinoma   - Pathologic staging: [id number]   - Focal infarction, metaplastic bone formation - Surgical margins are free of neoplasia - Unremarkable spleen and omentum   B. GALLBLADDER, CHOLECYSTECTOMY: - Benign gallbladder, biliary mucosa with no significant histologic abnormality - Negative for dysplasia COMMENT: Histologic sections demonstrate pools of acellular mucin, necrosis and scattered high grade dysplasia (PanIN-3) with no evidence of residual invasive carcinoma    4/30/18 - - surgical oncology follow-up, Dr. Byrd.  5/4/18 - - palliative care follow-up Dr. Snow. No specific recommendations required at this time.  5/14/18 - - oncology follow-up, Dr. Monk. Plan: adjuvant chemotherapy with gemcitabine and Xeloda.  5/30/18 - - cycle 1/day one adjuvant chemotherapy with gemcitabine and Xeloda.  Cycle three was dose reduced due to mucositis secondary to Xeloda.  8/28/18 - - oncology follow-up, DANTE Talavera. Cycle 4/day one gemcitabine and Xeloda.  9/17/18 - - CT chest/abdomen/pelvis - -IMPRESSION:  Resolving postoperative changes. No new findings or evidence of metastatic disease.  9/24/18 - - oncology follow-up, Dr. Monk. PLAN: initiate active surveillance.  12/21/18--CT c/a/p--IMPRESSION:  1. Stable tiny 2 to 3 mm lung nodules as described above. This  suggests a benign etiology.  2. Two low density liver lesions. These appear to vary in appearance  depending on slight variations in timing of the bolus of contrast.  Likely no significant change. Recommend continued close surveillance.  3. No evidence of recurrent mass in the abdomen or pelvis.  No  adenopathy.  12/28/18--scheduled oncology follow-up, Dr Monk. PATIENT NO-SHOW.  12/31/18--oncology follow-up, Dr Monk. Plan: Continue surveillance.  Increase Creon dose due to fatty food intolerance/weight loss.    3/29/19--CT c/a/p-- IMPRESSION: Stable scan compared to 12/21/2018 with the following  findings:  1. Several bilateral small pulmonary nodules, unchanged in appearance.  2. Two right hepatic small hypodense lesions, possibly hemangiomas.  These are also stable in appearance.  3. No apparent recurrent mass.  4/5/19 - - oncology follow-up, Dr. oMnk.     7/1/19--CT c/a/p--                                                                IMPRESSION:  Stable examination with no convincing metastatic or  recurrent neoplasm.  7/8/19--oncology follow-up, Dr. Monk.    9/30/19 - - CT chest/abdomen/pelvis - - IMPRESSION:  1.  There is a small grouped area of nodularity in the right middle  lobe. Grouped nature favors an inflammatory etiology.  2.  The abdomen CT is stable with no convincing metastatic or  recurrent neoplasm.  10/7/19 - - oncology follow-up, Dr. Monk.        Interim History:  Ms. Bangura returns to the clinic today with her .  She is generally feeling well.  She has no symptomatic complaints.  She walks 2-3 miles per day.  Her  also bikes and they keep very active together.  She is 13 months out from completion of adjuvant chemotherapy at this point, and a CT chest, abdomen and pelvis on 09/30 shows no evidence of recurrent malignancy.  Her CA 19-9 remains unremarkable.  There is just a small area of nodularity on the right middle lobe that could represent inflammation or infection but does not likely represent any form of metastatic or recurrent disease.  She uses 14 capsules of Creon for pancreatic enzyme insufficiency on average per day including 3 meals a day plus snacks.  She has no other questions or symptomatic issues at this time.            Review Of Systems:  Full 10-point ROS  "reviewed. Pertinent symptoms reviewed above per HPI.    PAST MEDICAL HISTORY:   Pre-diabetes  Pancreatitis as above     PAST SURGICAL HISTORY:  Laparotomy x2 for ruptured tubal pregnancies    Discectomy for herniated lumbar disk  Breast reduction surgery     FAMILY HISTORY:  Colon cancer in maternal aunt  Paternal aunt and cousins with breast cancer  CAD in father and brother     SH: , from [place], with 29 yr-old son. Works as . former smoker, quit 30 years ago. two glasses of wine per night, none since pancreatitis diagnosis     Allergies: none      Current Outpatient Medications:      acetaminophen (TYLENOL) 500 MG tablet, Take 2 tablets (1,000 mg) by mouth every 8 hours, Disp: 100 tablet, Rfl: 1     amylase-lipase-protease (CREON) 20233-78720 units CPEP per EC capsule, Take 2-3 with meals / 1-2 with snacks, up to 15 per day., Disp: 1350 capsule, Rfl: 3     blood glucose (ONETOUCH VERIO IQ) test strip, Use to test blood sugar 4 times daily or as directed., Disp: 400 each, Rfl: 3     clobetasol (TEMOVATE) 0.05 % GEL topical gel, Apply topically 2 times daily, Disp: 15 g, Rfl: 1     ibuprofen (ADVIL/MOTRIN) 200 MG tablet, Take 200 mg by mouth every 4 hours as needed for mild pain, Disp: , Rfl:      magic mouthwash (ENTER INGREDIENTS IN COMMENTS) suspension, Swish and spit 5 ml every 8 hours for 14 days (Patient not taking: Reported on 2019), Disp: 1000 mL, Rfl: 3     metFORMIN (GLUCOPHAGE-XR) 500 MG 24 hr tablet, Take 1 tablet 3 times a day with meals, Disp: 270 tablet, Rfl: 3     ONETOUCH DELICA LANCETS 33G MISC, 4 lancets daily, Disp: 100 each, Rfl: 3  No current facility-administered medications for this visit.     Facility-Administered Medications Ordered in Other Visits:      heparin 100 UNIT/ML injection 5 mL, 5 mL, Intracatheter, Once, Art Guevara MD      Physical Exam:  /70   Pulse 70   Resp 16   Ht 1.68 m (5' 6.14\")   Wt 61.5 kg (135 lb 8 oz)   SpO2 " 99%   Breastfeeding? No   BMI 21.78 kg/m        GENERAL:  Well-appearing, older  woman in no acute distress, alert and oriented x3.   HEENT:  Anicteric sclerae.  Oropharynx without mucositis or thrush.  No jaundice of the frenulum.     CARDIOVASCULAR:  Regular rate and rhythm, normal S1 and S2, no murmurs, gallops or rubs.   LUNGS:  Clear to auscultation throughout.   ABDOMEN:  Soft, with no tenderness. The midline surgical scar is well healed.  No fluctuance, erythema or tenderness.  No palpable masses, splenomegaly or ascites.   EXTREMITIES:  No evidence of lower extremity edema.           Laboratory/Imaging Studies    Results for EZEQUIEL BANGURA (MRN [id number]) as of 10/4/2019 12:37   Ref. Range 12/21/2018 09:05 3/29/2019 09:04 7/1/2019 08:45 9/30/2019 09:25   Cancer Antigen 19-9 Latest Ref Range: 0 - 37 U/mL 2 2 2 3     Prior to this visit, I did review the CT chest, abdomen and pelvis that was done for routine surveillance on 09/30/2019.  I provided a printed copy of the report, after reviewing it in full with her and her  today.  I also printed them a copy of the normal CA 19-9 values.             ASSESSMENT/PLAN:  Mrs. Bangura is a 58 year old woman with resected pancreatic tail adenocarcinoma.  She had initial extensive and severe necrotizing pancreatitis upon initial diagnosis and hospitalization that delayed treatment.  We ultimately were able to begin neoadjuvant FOLFIRINOX for 4 cycles beginning in January.  By the time she had pancreatectomy by Dr. Byrd in mid April there was no evidence of residual carcinoma seen on operative pathology.  She completed subsequently 4 months of gemcitabine and Xeloda in the adjuvant setting.       At this time, she is 13 months out from completion of adjuvant therapy.  I congratulated her on continuing to be cancer-free and without evidence of disease.  I suggested approximately a 3-4 month followup with a CT chest, abdomen and pelvis and CA  19-9 check.  As her CBC and CMP have been normal in the posttreatment setting, I do not think either of those would be indicated for next lab draw.  That will take us to December or January.  At that point, she asked whether or not she can space out the frequency of the surveillance visits to every 4 months.  I stated we will look forward to the results from the next scan and then make a determination at that time.  She will continue with Elidia, and she has adequate refills listed.           I spent 30 minutes in consultation, including H&P and Discussion. >50% of time was spent in counseling and in coordination of care.    Jovany Monk MD, PhD

## 2019-10-07 ENCOUNTER — ONCOLOGY VISIT (OUTPATIENT)
Dept: ONCOLOGY | Facility: CLINIC | Age: 59
End: 2019-10-07
Attending: INTERNAL MEDICINE
Payer: COMMERCIAL

## 2019-10-07 VITALS
RESPIRATION RATE: 16 BRPM | SYSTOLIC BLOOD PRESSURE: 106 MMHG | BODY MASS INDEX: 21.78 KG/M2 | DIASTOLIC BLOOD PRESSURE: 70 MMHG | HEART RATE: 70 BPM | OXYGEN SATURATION: 99 % | WEIGHT: 135.5 LBS | HEIGHT: 66 IN

## 2019-10-07 DIAGNOSIS — C25.9 MALIGNANT NEOPLASM OF PANCREAS, UNSPECIFIED LOCATION OF MALIGNANCY (H): Primary | ICD-10-CM

## 2019-10-07 PROCEDURE — 99214 OFFICE O/P EST MOD 30 MIN: CPT | Mod: ZP | Performed by: INTERNAL MEDICINE

## 2019-10-07 PROCEDURE — G0463 HOSPITAL OUTPT CLINIC VISIT: HCPCS | Mod: ZF

## 2019-10-07 ASSESSMENT — PAIN SCALES - GENERAL: PAINLEVEL: NO PAIN (0)

## 2019-10-07 ASSESSMENT — MIFFLIN-ST. JEOR: SCORE: 1203.59

## 2019-10-07 NOTE — LETTER
RE: Kitty Bangura  [address] [facility] [id number]     Dear Colleague,    Thank you for referring your patient, Kitty Bangura, to the [facility]. Please see a copy of my visit note below.    Oncology Follow-up Visit:  Oct 7, 2019    Cancer diagnosis: cystic pancreatic tail adenocarcinoma  small subcentimeter pulmonary nodules seen on staging scans of unclear etiology.     Treatment to date: neoadjuvant FOLFIRINOX x4 cycles, 1/15/18-3/6/18  Difficulty tolerating neoadjuvant intent chemotherapy. Distal pancreatectomy performed April 17, 2018, no residual carcinoma seen on operative pathology.  Treated with four cycles of adjuvant chemotherapy with gemcitabine and Xeloda, completed September 2018.     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     Referring physician: Dr. González Metz MD  Oncology History: She was previously healthy until she presented in early November 2017 with abdominal pain. CT abdomen on 11/1/17 showed acute pancreatitis (lipase 41,960) and a 3cm heterogenous pancreatic tail mass. The etiology of pancreatitis is unclear - no obstructing gallstone and not a heavy drinker. She was hospitalized for one week and followed up with Dr. González Metz in GI clinic.     11/29/17 -- endoscopic drainage of walled-off area of pancreatic necrosis by Dr. Metz. During the same procedure she had an EUS FNA of the pancreatic tail mass and pathology showed adenocarcinoma. The mass measured 33 x 27 mm, encapsulated with solid and cystic components, rim calcification, and no evidence of invasion.     12/9/17 -- Staging CT chest/abd/pelvis showed several small pulmonary nodules (largest 3mm), unchanged mass in the pancreatic tail, mildly prominent mesenteric nodes thought likely reactive, and  small right hepatic lobe hypodensities. Abdominal MRI on 12/10/17 showed hepatic hemangiomas, no evidence of metastatic disease to the liver.     1/8/18 - - oncology consultation, Dr. Monk. Recommendation: neoadjuvant intent chemotherapy with FOLFIRINOX.    1/15/18 - - cycle 1/day one FOLFIRINOX chemotherapy.    1/20 through 1/27/18 - - hospitalization at the [facility], for acute pancreatitis. Following three days of nausea, vomiting, pain. During this hospitalization: ERCP was attempted by Dr. Sanches with pancreatic stent placement, but pancreatic duct was completely obstructed and wire was unable to pass beyond the duct. Dr. Metz performed successful US guided cyst gastrostomy January 25, 2018.    1/31/18 - - oncology follow-up, DANTE Talavera. Neutropenic with ANC 0.4, thus defer cycle two of chemotherapy.    2/5/18 - - oncology follow-up, DANTE Junior.  Cycle 2/day one FOLFIRINOX chemotherapy. Patient endorses cold sensitivity secondary to oxaliplatin. Neulasta given for growth factor support. Due to significant neutropenia with cycle one.    2/11/18 - - ER evaluation for epigastric pain. Discharged to home from ER. Leukocytosis secondary to Neulasta given on February 8.    2/20/18 - - oncology follow-up, DANTE Junior. Cycle 3/day one FOLFIRINOX given without Neulasta. At patient request, oxaliplatin dose reduced by 10% due to neuropathy/cold sensitivity.    3/6/18 - - oncology follow-up, DANTE Talavera. Cycle 4/day one FOLFIRINOX chemotherapy.    3/13/18 - - CT chest/abdomen/pelvis - - IMPRESSION: 1. Minimal decrease in size and marked decrease in enhancement and pancreatic mass since the comparison study. 2. No significant change in low dense lesions in the liver since comparison.    3/16/18 - - oncology follow-up Dr. Monk. Plan is to hold on further chemotherapy as surgery is scheduled for 4/17/18 4/17/18 - - surgery: PROCEDURE: Diagnostic laparoscopy,  splenic flexure mobilization, intraoperative ultrasound, distal pancreatectomy, splenectomy, and cholecystectomy. SURGEON: Ja Byrd MD  Final surgical pathology showed necrotizing pancreatitis, no evidence of residual cancer, complete pathologic response, 26 benign lymph nodes.  FINAL DIAGNOSIS: A. DISTAL PANCREAS, SPLEEN AND OMENTUM, RESECTION: - Necrotizing pancreatitis with residual acellular mucin and foci of high grade dysplasia, status post neoadjuvant therapy   - Treatment response: complete (score 0)   - Twenty-six benign lymph nodes (0/26)   - Negative for residual carcinoma   - Pathologic staging: [id number]   - Focal infarction, metaplastic bone formation - Surgical margins are free of neoplasia - Unremarkable spleen and omentum   B. GALLBLADDER, CHOLECYSTECTOMY: - Benign gallbladder, biliary mucosa with no significant histologic abnormality - Negative for dysplasia COMMENT: Histologic sections demonstrate pools of acellular mucin, necrosis and scattered high grade dysplasia (PanIN-3) with no evidence of residual invasive carcinoma    4/30/18 - - surgical oncology follow-up, Dr. Byrd.  5/4/18 - - palliative care follow-up Dr. Snow. No specific recommendations required at this time.  5/14/18 - - oncology follow-up, Dr. Monk. Plan: adjuvant chemotherapy with gemcitabine and Xeloda.  5/30/18 - - cycle 1/day one adjuvant chemotherapy with gemcitabine and Xeloda.  Cycle three was dose reduced due to mucositis secondary to Xeloda.  8/28/18 - - oncology follow-up, DANTE Talavera. Cycle 4/day one gemcitabine and Xeloda.  9/17/18 - - CT chest/abdomen/pelvis - -IMPRESSION:  Resolving postoperative changes. No new findings or evidence of metastatic disease.  9/24/18 - - oncology follow-up, Dr. Monk. PLAN: initiate active surveillance.  12/21/18--CT c/a/p--IMPRESSION:  1. Stable tiny 2 to 3 mm lung nodules as described above. This  suggests a benign etiology.  2. Two low density liver lesions. These  appear to vary in appearance  depending on slight variations in timing of the bolus of contrast.  Likely no significant change. Recommend continued close surveillance.  3. No evidence of recurrent mass in the abdomen or pelvis. No  adenopathy.  12/28/18--scheduled oncology follow-up, Dr Monk. PATIENT NO-SHOW.  12/31/18--oncology follow-up, Dr Monk. Plan: Continue surveillance.  Increase Creon dose due to fatty food intolerance/weight loss.    3/29/19--CT c/a/p-- IMPRESSION: Stable scan compared to 12/21/2018 with the following  findings:  1. Several bilateral small pulmonary nodules, unchanged in appearance.  2. Two right hepatic small hypodense lesions, possibly hemangiomas.  These are also stable in appearance.  3. No apparent recurrent mass.  4/5/19 - - oncology follow-up, Dr. Monk.     7/1/19--CT c/a/p--                                                                IMPRESSION:  Stable examination with no convincing metastatic or  recurrent neoplasm.  7/8/19--oncology follow-up, Dr. Monk.    9/30/19 - - CT chest/abdomen/pelvis - - IMPRESSION:  1.  There is a small grouped area of nodularity in the right middle  lobe. Grouped nature favors an inflammatory etiology.  2.  The abdomen CT is stable with no convincing metastatic or  recurrent neoplasm.  10/7/19 - - oncology follow-up, Dr. Monk.        Interim History:  Ms. Bangura returns to the clinic today with her .  She is generally feeling well.  She has no symptomatic complaints.  She walks 2-3 miles per day.  Her  also bikes and they keep very active together.  She is 13 months out from completion of adjuvant chemotherapy at this point, and a CT chest, abdomen and pelvis on 09/30 shows no evidence of recurrent malignancy.  Her CA 19-9 remains unremarkable.  There is just a small area of nodularity on the right middle lobe that could represent inflammation or infection but does not likely represent any form of metastatic or recurrent disease.  She uses  14 capsules of Creon for pancreatic enzyme insufficiency on average per day including 3 meals a day plus snacks.  She has no other questions or symptomatic issues at this time.            Review Of Systems:  Full 10-point ROS reviewed. Pertinent symptoms reviewed above per HPI.    PAST MEDICAL HISTORY:   Pre-diabetes  Pancreatitis as above     PAST SURGICAL HISTORY:  Laparotomy x2 for ruptured tubal pregnancies    Discectomy for herniated lumbar disk  Breast reduction surgery     FAMILY HISTORY:  Colon cancer in maternal aunt  Paternal aunt and cousins with breast cancer  CAD in father and brother     SH: , from [place], with 29 yr-old son. Works as . former smoker, quit 30 years ago. two glasses of wine per night, none since pancreatitis diagnosis     Allergies: none      Current Outpatient Medications:      acetaminophen (TYLENOL) 500 MG tablet, Take 2 tablets (1,000 mg) by mouth every 8 hours, Disp: 100 tablet, Rfl: 1     amylase-lipase-protease (CREON) 56960-87517 units CPEP per EC capsule, Take 2-3 with meals / 1-2 with snacks, up to 15 per day., Disp: 1350 capsule, Rfl: 3     blood glucose (ONETOUCH VERIO IQ) test strip, Use to test blood sugar 4 times daily or as directed., Disp: 400 each, Rfl: 3     clobetasol (TEMOVATE) 0.05 % GEL topical gel, Apply topically 2 times daily, Disp: 15 g, Rfl: 1     ibuprofen (ADVIL/MOTRIN) 200 MG tablet, Take 200 mg by mouth every 4 hours as needed for mild pain, Disp: , Rfl:      magic mouthwash (ENTER INGREDIENTS IN COMMENTS) suspension, Swish and spit 5 ml every 8 hours for 14 days (Patient not taking: Reported on 2019), Disp: 1000 mL, Rfl: 3     metFORMIN (GLUCOPHAGE-XR) 500 MG 24 hr tablet, Take 1 tablet 3 times a day with meals, Disp: 270 tablet, Rfl: 3     ONETOUCH DELICA LANCETS 33G MISC, 4 lancets daily, Disp: 100 each, Rfl: 3  No current facility-administered medications for this visit.     Facility-Administered Medications Ordered  "in Other Visits:      heparin 100 UNIT/ML injection 5 mL, 5 mL, Intracatheter, Once, Art Guevara MD      Physical Exam:  /70   Pulse 70   Resp 16   Ht 1.68 m (5' 6.14\")   Wt 61.5 kg (135 lb 8 oz)   SpO2 99%   Breastfeeding? No   BMI 21.78 kg/m         GENERAL:  Well-appearing, older  woman in no acute distress, alert and oriented x3.   HEENT:  Anicteric sclerae.  Oropharynx without mucositis or thrush.  No jaundice of the frenulum.     CARDIOVASCULAR:  Regular rate and rhythm, normal S1 and S2, no murmurs, gallops or rubs.   LUNGS:  Clear to auscultation throughout.   ABDOMEN:  Soft, with no tenderness. The midline surgical scar is well healed.  No fluctuance, erythema or tenderness.  No palpable masses, splenomegaly or ascites.   EXTREMITIES:  No evidence of lower extremity edema.           Laboratory/Imaging Studies    Results for EZEQUIEL BANGURA (MRN [id number]) as of 10/4/2019 12:37   Ref. Range 12/21/2018 09:05 3/29/2019 09:04 7/1/2019 08:45 9/30/2019 09:25   Cancer Antigen 19-9 Latest Ref Range: 0 - 37 U/mL 2 2 2 3     Prior to this visit, I did review the CT chest, abdomen and pelvis that was done for routine surveillance on 09/30/2019.  I provided a printed copy of the report, after reviewing it in full with her and her  today.  I also printed them a copy of the normal CA 19-9 values.       ASSESSMENT/PLAN:  Mrs. Bangura is a 58 year old woman with resected pancreatic tail adenocarcinoma.  She had initial extensive and severe necrotizing pancreatitis upon initial diagnosis and hospitalization that delayed treatment.  We ultimately were able to begin neoadjuvant FOLFIRINOX for 4 cycles beginning in January.  By the time she had pancreatectomy by Dr. Byrd in mid April there was no evidence of residual carcinoma seen on operative pathology.  She completed subsequently 4 months of gemcitabine and Xeloda in the adjuvant setting.       At this time, she is 13 " months out from completion of adjuvant therapy.  I congratulated her on continuing to be cancer-free and without evidence of disease.  I suggested approximately a 3-4 month followup with a CT chest, abdomen and pelvis and CA 19-9 check.  As her CBC and CMP have been normal in the posttreatment setting, I do not think either of those would be indicated for next lab draw.  That will take us to December or January.  At that point, she asked whether or not she can space out the frequency of the surveillance visits to every 4 months.  I stated we will look forward to the results from the next scan and then make a determination at that time.  She will continue with Elidia, and she has adequate refills listed.     I spent 30 minutes in consultation, including H&P and Discussion. >50% of time was spent in counseling and in coordination of care.    Jovany Monk MD, PhD

## 2019-10-07 NOTE — NURSING NOTE
"Oncology Rooming Note    October 7, 2019 8:40 AM   Kitty Bangura is a 58 year old female who presents for:    Chief Complaint   Patient presents with     Oncology Clinic Visit     RETURN VISIT; PANCREATIC CANCER; VITALS TAKEN      Initial Vitals: /70   Pulse 70   Resp 16   Ht 1.68 m (5' 6.14\")   Wt 61.5 kg (135 lb 8 oz)   SpO2 99%   Breastfeeding? No   BMI 21.78 kg/m   Estimated body mass index is 21.78 kg/m  as calculated from the following:    Height as of this encounter: 1.68 m (5' 6.14\").    Weight as of this encounter: 61.5 kg (135 lb 8 oz). Body surface area is 1.69 meters squared.  No Pain (0) Comment: Data Unavailable   No LMP recorded. Patient is postmenopausal.  Allergies reviewed: Yes  Medications reviewed: Yes    Medications: Medication refills not needed today.  Pharmacy name entered into [facility]:    [id number] - [place], [place] - [address] [facility] - [facility], [place] - [address] [id number] - [place], [place] - [address] [facility] - [place], [place] - [address]    Clinical concerns: No new concerns today  Dr Monk was notified.      Wanda Sandy              "

## 2019-10-11 ASSESSMENT — ENCOUNTER SYMPTOMS
EYE PAIN: 0
BREAST MASS: 0
MYALGIAS: 0
HEMATURIA: 0
FEVER: 0
JOINT SWELLING: 0
PARESTHESIAS: 0
HEMATOCHEZIA: 0
HEADACHES: 0
ARTHRALGIAS: 1
DIZZINESS: 1
COUGH: 1
FREQUENCY: 1
NERVOUS/ANXIOUS: 1
DYSURIA: 0
HEARTBURN: 0
CHILLS: 0
NAUSEA: 0
CONSTIPATION: 0
SHORTNESS OF BREATH: 0
WEAKNESS: 0
SORE THROAT: 0
ABDOMINAL PAIN: 1
PALPITATIONS: 0
DIARRHEA: 1

## 2019-10-14 ENCOUNTER — OFFICE VISIT (OUTPATIENT)
Dept: FAMILY MEDICINE | Facility: CLINIC | Age: 59
End: 2019-10-14
Payer: COMMERCIAL

## 2019-10-14 VITALS
OXYGEN SATURATION: 97 % | BODY MASS INDEX: 21.63 KG/M2 | WEIGHT: 134.6 LBS | TEMPERATURE: 97 F | DIASTOLIC BLOOD PRESSURE: 60 MMHG | HEIGHT: 66 IN | HEART RATE: 63 BPM | RESPIRATION RATE: 16 BRPM | SYSTOLIC BLOOD PRESSURE: 96 MMHG

## 2019-10-14 DIAGNOSIS — E11.9 TYPE 2 DIABETES MELLITUS WITHOUT COMPLICATION, WITHOUT LONG-TERM CURRENT USE OF INSULIN (H): ICD-10-CM

## 2019-10-14 DIAGNOSIS — D69.6 THROMBOCYTOPENIA (H): ICD-10-CM

## 2019-10-14 DIAGNOSIS — G62.0 DRUG-INDUCED POLYNEUROPATHY (H): ICD-10-CM

## 2019-10-14 DIAGNOSIS — Z00.00 ROUTINE GENERAL MEDICAL EXAMINATION AT A HEALTH CARE FACILITY: Primary | ICD-10-CM

## 2019-10-14 DIAGNOSIS — Z23 NEED FOR PROPHYLACTIC VACCINATION AND INOCULATION AGAINST INFLUENZA: ICD-10-CM

## 2019-10-14 DIAGNOSIS — Z23 NEED FOR VACCINATION: ICD-10-CM

## 2019-10-14 DIAGNOSIS — E46 PROTEIN-CALORIE MALNUTRITION, UNSPECIFIED SEVERITY (H): ICD-10-CM

## 2019-10-14 PROBLEM — K85.90 PANCREATITIS: Status: RESOLVED | Noted: 2017-11-01 | Resolved: 2019-10-14

## 2019-10-14 PROBLEM — E87.0 HYPERNATREMIA: Status: RESOLVED | Noted: 2018-04-18 | Resolved: 2019-10-14

## 2019-10-14 PROBLEM — I95.9 HYPOTENSION: Status: RESOLVED | Noted: 2018-04-18 | Resolved: 2019-10-14

## 2019-10-14 LAB
CHOLEST SERPL-MCNC: 171 MG/DL
HBA1C MFR BLD: 6.7 % (ref 0–5.6)
HDLC SERPL-MCNC: 78 MG/DL
LDLC SERPL CALC-MCNC: 77 MG/DL
NONHDLC SERPL-MCNC: 93 MG/DL
TRIGL SERPL-MCNC: 80 MG/DL

## 2019-10-14 PROCEDURE — 99396 PREV VISIT EST AGE 40-64: CPT | Mod: 25 | Performed by: FAMILY MEDICINE

## 2019-10-14 PROCEDURE — 83036 HEMOGLOBIN GLYCOSYLATED A1C: CPT | Performed by: FAMILY MEDICINE

## 2019-10-14 PROCEDURE — 90471 IMMUNIZATION ADMIN: CPT | Performed by: FAMILY MEDICINE

## 2019-10-14 PROCEDURE — 36415 COLL VENOUS BLD VENIPUNCTURE: CPT | Performed by: FAMILY MEDICINE

## 2019-10-14 PROCEDURE — 90670 PCV13 VACCINE IM: CPT | Performed by: FAMILY MEDICINE

## 2019-10-14 PROCEDURE — 80061 LIPID PANEL: CPT | Performed by: FAMILY MEDICINE

## 2019-10-14 ASSESSMENT — MIFFLIN-ST. JEOR: SCORE: 1199.51

## 2019-10-14 ASSESSMENT — PAIN SCALES - GENERAL: PAINLEVEL: NO PAIN (0)

## 2019-10-14 NOTE — PROGRESS NOTES
SUBJECTIVE:   CC: Kitty Bangura is an 58 year old woman who presents for preventive health visit.     58 yr old female here for her annual physical. 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 She denies any other acute symptoms today. She is following with oncology every three months and her last scan showed no recurrence of the cancer.   She had a splenectomy because of the pancreatic CA and she was wondering about booster immunizations like pneumonia shots .It appears that she has received her first PCV 23 shot. We will give her the 23 shot today. Denies any other symptoms today.     Chief Complaint   Patient presents with     Physical     discuss pneumonia booster, fasting for labs     Answers for HPI/ROS submitted by the patient on 10/11/2019   Annual Exam:  Frequency of exercise:: 6-7 days/week  Getting at least 3 servings of Calcium per day:: NO  Diet:: Low fat/cholesterol, Diabetic, Carbohydrate counting  Taking medications regularly:: Yes  Medication side effects:: None, No muscle aches, No significant flushing  Bi-annual eye exam:: Yes  Dental care twice a year:: Yes  Sleep apnea or symptoms of sleep apnea:: Daytime drowsiness  abdominal pain: Yes  Blood in stool: No  Blood in urine: No  chest pain: No  chills: No  congestion: No  constipation: No  cough: Yes  diarrhea: Yes  dizziness: Yes  ear pain: No  eye pain: No  nervous/anxious: Yes  fever: No  frequency: Yes  genital sores: No  headaches: No  hearing loss: No  heartburn: No  arthralgias: Yes  joint swelling: No  peripheral edema: No  mood changes: No  myalgias: No  nausea: No  dysuria: No  palpitations: No  Skin sensation changes: No  sore throat: No  urgency: No  rash: No  shortness of breath: No  visual disturbance: No  weakness: No  pelvic pain: No  vaginal bleeding: No  vaginal discharge: No  tenderness: No  breast mass: No  breast discharge:  No  Additional concerns today:: Yes  Duration of exercise:: 45-60 minutes      Today's PHQ-2 Score:   PHQ-2 (  [employer]) 10/11/2019 2019   Q1: Little interest or pleasure in doing things 0 (No Data)   Q2: Feeling down, depressed or hopeless 0 (No Data)   PHQ-2 Score 0 -   Q1: Little interest or pleasure in doing things Not at all -   Q2: Feeling down, depressed or hopeless Not at all -   PHQ-2 Score 0 -     Abuse: Current or Past(Physical, Sexual or Emotional)- No  Do you feel safe in your environment? Yes    Social History     Tobacco Use     Smoking status: Former Smoker     Packs/day: 0.50     Years: 5.00     Pack years: 2.50     Types: Cigarettes     Start date: 1974     Last attempt to quit:      Years since quittin.8     Smokeless tobacco: Never Used   Substance Use Topics     Alcohol use: No     Alcohol/week: 21.0 standard drinks     Comment: Now quit since Oct 31.  Moderate drinker in past     If you drink alcohol do you typically have >3 drinks per day or >7 drinks per week? No                     Reviewed orders with patient.  Reviewed health maintenance and updated orders accordingly - Yes  Lab work is in process  BP Readings from Last 3 Encounters:   10/14/19 96/60   10/07/19 106/70   19 113/68    Wt Readings from Last 3 Encounters:   10/14/19 61.1 kg (134 lb 9.6 oz)   10/07/19 61.5 kg (135 lb 8 oz)   19 62.1 kg (137 lb)                  Patient Active Problem List   Diagnosis     Acute pancreatitis     Leukocytosis     Fatty liver     Pancreatic mass     Necrotizing pancreatitis     Pancreatic adenocarcinoma (H)     Anemia     Thrombocytopenia (H)     Type 2 diabetes mellitus without complication, without long-term current use of insulin (H)     Drug-induced polyneuropathy (H)     Past Surgical History:   Procedure Laterality Date     ABDOMEN SURGERY      Ruptured tubal pregnancies, additional surgeries followed     BACK SURGERY      L5, S1 discectomy     BREAST  SURGERY  2003    Breast reduction     COLONOSCOPY  2008     COLONOSCOPY N/A 12/11/2018    Procedure: colonoscopy;  Surgeon: Lobito Marte MD;  Location: [facility]     ENDOSCOPIC RETROGRADE CHOLANGIOPANCREATOGRAM N/A 1/25/2018    Procedure: COMBINED ENDOSCOPIC RETROGRADE CHOLANGIOPANCREATOGRAPHY, PLACE TUBE/STENT;  Endoscopic retrograde cholangiopancreatogram with stent placement and cyst drainage bile ;  Surgeon: Vito Sanches MD;  [facility]     ENDOSCOPIC ULTRASOUND LOWER GASTROINTESTIONAL TRACT (GI) N/A 1/25/2018    Procedure: ENDOSCOPIC ULTRASOUND LOWER GASTROINTESTIONAL TRACT (GI);  Endoscopic Ultrasound with guided Cyst-Gastrostomy;  Surgeon: González Metz MD;  [facility]: [facility]     ENDOSCOPIC ULTRASOUND, ESOPHAGOSCOPY, GASTROSCOPY, DUODENOSCOPY (EGD), NECROSECTOMY N/A 12/4/2017    Procedure: ENDOSCOPIC ULTRASOUND, ESOPHAGOSCOPY, GASTROSCOPY, DUODENOSCOPY (EGD), NECROSECTOMY;  Esophagogastroduodenoscopy, with  Necrosectomy and stents replacement  ;  Surgeon: González Metz MD;  [facility]     ENDOSCOPIC ULTRASOUND, ESOPHAGOSCOPY, GASTROSCOPY, DUODENOSCOPY (EGD), NECROSECTOMY N/A 12/12/2017    Procedure: ENDOSCOPIC ULTRASOUND, ESOPHAGOSCOPY, GASTROSCOPY, DUODENOSCOPY (EGD), NECROSECTOMY;  Esophagogastroduodenoscopy with Necrosectomy, Balloon Dilation, stent removal X3 and Cystgastrostomy stent Placement X2;  Surgeon: Guru Cecilia Staples MD;  [facility]     ESOPHAGOSCOPY, GASTROSCOPY, DUODENOSCOPY (EGD), COMBINED N/A 11/29/2017    Procedure: COMBINED ENDOSCOPIC ULTRASOUND, ESOPHAGOSCOPY, GASTROSCOPY, DUODENOSCOPY (EGD), FINE NEEDLE ASPIRATE/BIOPSY;  Endoscopic Ultrasound with cystgastrostomy and fine needle aspiration ;  Surgeon: González Metz MD;  [facility]     ESOPHAGOSCOPY, GASTROSCOPY, DUODENOSCOPY (EGD), COMBINED N/A 5/8/2018    Procedure: COMBINED ESOPHAGOSCOPY, GASTROSCOPY, DUODENOSCOPY (EGD);  EGD;  Surgeon: González Metz MD;   Location:  GI     ESOPHAGOSCOPY, GASTROSCOPY, DUODENOSCOPY (EGD), COMBINED N/A 2018    Procedure: COMBINED ESOPHAGOSCOPY, GASTROSCOPY, DUODENOSCOPY (EGD), REMOVE FOREIGN BODY;;  Surgeon: González Metz MD;  Location:  GI     GYN SURGERY  1983    Multiple ectopic pregnancies, reconnect,       INSERT PORT VASCULAR ACCESS Right 2018    Procedure: INSERT PORT VASCULAR ACCESS;  Chest Port Placement - right;  Surgeon: Chanda Lawson PA-C;  [facility]     IR PORT REMOVAL RIGHT  2019     LAPAROSCOPY DIAGNOSTIC (GENERAL) N/A 2018    Procedure: LAPAROSCOPY DIAGNOSTIC (GENERAL);  Diagnostic Laparoscopy; Open Distal Pancreatectomy And Splenectomy, splenic flexure mobilization, cholecystectomy, intraoperative ultrasound;  Surgeon: Ja Byrd MD;  [facility]     PANCREATECTOMY, SPLENECTOMY N/A 2018    Procedure: PANCREATECTOMY, SPLENECTOMY;;  Surgeon: Ja Byrd MD;  [facility]     REMOVE PORT VASCULAR ACCESS Right 2019    Procedure: Right Port Removal;  Surgeon: Juan J Donaldson PA-C;  Location:  OR       Social History     Tobacco Use     Smoking status: Former Smoker     Packs/day: 0.50     Years: 5.00     Pack years: 2.50     Types: Cigarettes     Start date: 1974     Last attempt to quit: 1981     Years since quittin.8     Smokeless tobacco: Never Used   Substance Use Topics     Alcohol use: No     Alcohol/week: 21.0 standard drinks     Comment: Now quit since Oct 31.  Moderate drinker in past     Family History   Problem Relation Age of Onset     Heart Disease Father      Hyperlipidemia Father      Heart Disease Brother      Diabetes Mother      Hypertension Mother      Obesity Mother      Depression Mother      Diabetes Maternal Grandfather      Hypertension Maternal Grandfather      Obesity Maternal Grandfather      Diabetes Sister      Hypertension Sister      Depression Sister      Anxiety Disorder Sister      Obesity Sister       Hypertension Brother      Hyperlipidemia Brother      Heart Disease Brother      Breast Cancer Cousin      Breast Cancer Cousin      Breast Cancer Cousin      Colon Cancer Other      Other Cancer Other         Mesothelioma     Depression Sister      Anxiety Disorder Brother      Anxiety Disorder Son      Depression Son      Mental Illness Sister         Schizophrenia     Hypertension Sister      Obesity Maternal Grandmother      Obesity Sister      Diabetes Nephew      Hypertension Brother          Current Outpatient Medications   Medication Sig Dispense Refill     acetaminophen (TYLENOL) 500 MG tablet Take 2 tablets (1,000 mg) by mouth every 8 hours 100 tablet 1     amylase-lipase-protease (CREON) 39234-18674 units CPEP per EC capsule Take 2-3 with meals / 1-2 with snacks, up to 15 per day. 1350 capsule 3     blood glucose (ONETOUCH VERIO IQ) test strip Use to test blood sugar 4 times daily or as directed. 400 each 3     clobetasol (TEMOVATE) 0.05 % GEL topical gel Apply topically 2 times daily 15 g 1     metFORMIN (GLUCOPHAGE-XR) 500 MG 24 hr tablet Take 1 tablet 3 times a day with meals 270 tablet 3     ONETOUCH DELICA LANCETS 33G MISC 4 lancets daily 100 each 3     No Known Allergies    Mammogram Screening: Patient over age 50, mutual decision to screen reflected in health maintenance.    Pertinent mammograms are reviewed under the imaging tab.  History of abnormal Pap smear: NO - age 30-65 PAP every 5 years with negative HPV co-testing recommended  PAP / HPV Latest Ref Rng & Units 10/5/2018   PAP - NIL   HPV 16 DNA NEG:Negative Negative   HPV 18 DNA NEG:Negative Negative   OTHER HR HPV NEG:Negative Negative     Reviewed and updated as needed this visit by clinical staff  Tobacco  Allergies  Meds  Problems  Med Hx  Surg Hx  Fam Hx  Soc Hx          Reviewed and updated as needed this visit by Provider  Tobacco  Allergies  Meds  Problems  Med Hx  Surg Hx  Fam Hx        Past Medical History:    Diagnosis Date     Arthritis Post chemo    Suspected     Diabetes (H)      Necrotizing pancreatitis      Pancreatic cancer (H)      Pneumonia     2016     PONV (postoperative nausea and vomiting)       Past Surgical History:   Procedure Laterality Date     ABDOMEN SURGERY  1983    Ruptured tubal pregnancies, additional surgeries followed     BACK SURGERY  2004    L5, S1 discectomy     BREAST SURGERY  2003    Breast reduction     COLONOSCOPY  2008     COLONOSCOPY N/A 12/11/2018    Procedure: colonoscopy;  Surgeon: Lobito Marte MD;  Location: [facility]     ENDOSCOPIC RETROGRADE CHOLANGIOPANCREATOGRAM N/A 1/25/2018    Procedure: COMBINED ENDOSCOPIC RETROGRADE CHOLANGIOPANCREATOGRAPHY, PLACE TUBE/STENT;  Endoscopic retrograde cholangiopancreatogram with stent placement and cyst drainage bile ;  Surgeon: Vito Sanches MD;  [facility]     ENDOSCOPIC ULTRASOUND LOWER GASTROINTESTIONAL TRACT (GI) N/A 1/25/2018    Procedure: ENDOSCOPIC ULTRASOUND LOWER GASTROINTESTIONAL TRACT (GI);  Endoscopic Ultrasound with guided Cyst-Gastrostomy;  Surgeon: González Metz MD;  [facility]     ENDOSCOPIC ULTRASOUND, ESOPHAGOSCOPY, GASTROSCOPY, DUODENOSCOPY (EGD), NECROSECTOMY N/A 12/4/2017    Procedure: ENDOSCOPIC ULTRASOUND, ESOPHAGOSCOPY, GASTROSCOPY, DUODENOSCOPY (EGD), NECROSECTOMY;  Esophagogastroduodenoscopy, with  Necrosectomy and stents replacement  ;  Surgeon: González Metz MD;  [facility]     ENDOSCOPIC ULTRASOUND, ESOPHAGOSCOPY, GASTROSCOPY, DUODENOSCOPY (EGD), NECROSECTOMY N/A 12/12/2017    Procedure: ENDOSCOPIC ULTRASOUND, ESOPHAGOSCOPY, GASTROSCOPY, DUODENOSCOPY (EGD), NECROSECTOMY;  Esophagogastroduodenoscopy with Necrosectomy, Balloon Dilation, stent removal X3 and Cystgastrostomy stent Placement X2;  Surgeon: Guru Cecilia Staples MD;  [facility]     ESOPHAGOSCOPY, GASTROSCOPY, DUODENOSCOPY (EGD), COMBINED N/A 11/29/2017    Procedure: COMBINED ENDOSCOPIC  ULTRASOUND, ESOPHAGOSCOPY, GASTROSCOPY, DUODENOSCOPY (EGD), FINE NEEDLE ASPIRATE/BIOPSY;  Endoscopic Ultrasound with cystgastrostomy and fine needle aspiration ;  Surgeon: González Mtez MD;  [facility]     ESOPHAGOSCOPY, GASTROSCOPY, DUODENOSCOPY (EGD), COMBINED N/A 2018    Procedure: COMBINED ESOPHAGOSCOPY, GASTROSCOPY, DUODENOSCOPY (EGD);  EGD;  Surgeon: González Metz MD;  [facility]     ESOPHAGOSCOPY, GASTROSCOPY, DUODENOSCOPY (EGD), COMBINED N/A 2018    Procedure: COMBINED ESOPHAGOSCOPY, GASTROSCOPY, DUODENOSCOPY (EGD), REMOVE FOREIGN BODY;;  Surgeon: González Metz MD;  Location:  GI     GYN SURGERY  1983    Multiple ectopic pregnancies, reconnect,  1988     INSERT PORT VASCULAR ACCESS Right 2018    Procedure: INSERT PORT VASCULAR ACCESS;  Chest Port Placement - right;  Surgeon: Chanda Lawson PA-C;  [facility]     IR PORT REMOVAL RIGHT  2019     LAPAROSCOPY DIAGNOSTIC (GENERAL) N/A 2018    Procedure: LAPAROSCOPY DIAGNOSTIC (GENERAL);  Diagnostic Laparoscopy; Open Distal Pancreatectomy And Splenectomy, splenic flexure mobilization, cholecystectomy, intraoperative ultrasound;  Surgeon: Ja Byrd MD;  [facility]     PANCREATECTOMY, SPLENECTOMY N/A 2018    Procedure: PANCREATECTOMY, SPLENECTOMY;;  Surgeon: Ja Byrd MD;  [facility]     REMOVE PORT VASCULAR ACCESS Right 2019    Procedure: Right Port Removal;  Surgeon: Juan J Donaldson PA-C;  [facility]     OB History   No obstetric history on file.       ROS:  CONSTITUTIONAL: NEGATIVE for fever, chills, change in weight  INTEGUMENTARY/SKIN: NEGATIVE for worrisome rashes, moles or lesions  EYES: NEGATIVE for vision changes or irritation  ENT: NEGATIVE for ear, mouth and throat problems  RESP: NEGATIVE for significant cough or SOB  BREAST: NEGATIVE for masses, tenderness or discharge  CV: NEGATIVE for chest pain, palpitations or peripheral edema  GI:  "NEGATIVE for nausea, abdominal pain, heartburn, or change in bowel habits  : NEGATIVE for unusual urinary or vaginal symptoms. No vaginal bleeding.  MUSCULOSKELETAL: NEGATIVE for significant arthralgias or myalgia  NEURO: NEGATIVE for weakness, dizziness or paresthesias  PSYCHIATRIC: NEGATIVE for changes in mood or affect     OBJECTIVE:   BP 96/60   Pulse 63   Temp 97  F (36.1  C) (Tympanic)   Resp 16   Ht 1.68 m (5' 6.14\")   Wt 61.1 kg (134 lb 9.6 oz)   SpO2 97%   BMI 21.63 kg/m    EXAM:  GENERAL: healthy, alert and no distress  EYES: Eyes grossly normal to inspection, PERRL and conjunctivae and sclerae normal  HENT: ear canals and TM's normal, nose and mouth without ulcers or lesions  NECK: no adenopathy, no asymmetry, masses, or scars and thyroid normal to palpation  RESP: lungs clear to auscultation - no rales, rhonchi or wheezes  CV: regular rate and rhythm, normal S1 S2, no S3 or S4, no murmur, click or rub, no peripheral edema and peripheral pulses strong  ABDOMEN: soft, nontender, no hepatosplenomegaly, no masses and bowel sounds normal  MS: no gross musculoskeletal defects noted, no edema  SKIN: no suspicious lesions or rashes  PSYCH: mentation appears normal, affect normal/bright    Diagnostic Test Results:  Results for orders placed or performed in visit on 10/14/19 (from the past 24 hour(s))   Hemoglobin A1c   Result Value Ref Range    Hemoglobin A1C 6.7 (H) 0 - 5.6 %       ASSESSMENT/PLAN:   1. Routine general medical examination at a health care facility  Patient will be notiifed of results .   - Lipid panel reflex to direct LDL Fasting    2. Need for prophylactic vaccination and inoculation against influenza  Immunization reported     3. Need for vaccination  - Pneumococcal vaccine 13 valent PCV13 IM (Prevnar) [[id number]]  - 1st  Administration  [[id number]]    4. Type 2 diabetes mellitus without complication, without long-term current use of insulin (H)  Patient will be notified of results.   - " "Hemoglobin A1c      5. Protein-calorie malnutrition, unspecified severity (H)  Doing much better , this was related to the cancer.     6. Drug-induced polyneuropathy (H)  Stable , no new symptoms .     7. Thrombocytopenia (H)  Stable     COUNSELING:   Reviewed preventive health counseling, as reflected in patient instructions       Regular exercise       Healthy diet/nutrition       Vision screening    Estimated body mass index is 21.63 kg/m  as calculated from the following:    Height as of this encounter: 1.68 m (5' 6.14\").    Weight as of this encounter: 61.1 kg (134 lb 9.6 oz).         reports that she quit smoking about 38 years ago. Her smoking use included cigarettes. She started smoking about 45 years ago. She has a 2.50 pack-year smoking history. She has never used smokeless tobacco.      Counseling Resources:  ATP IV Guidelines  Pooled Cohorts Equation Calculator  Breast Cancer Risk Calculator  FRAX Risk Assessment  ICSI Preventive Guidelines  Dietary Guidelines for Americans, 2010  [place]DA's MyPlate  ASA Prophylaxis  Lung CA Screening    Solange Mcgill MD  [facility]  "

## 2019-10-15 NOTE — RESULT ENCOUNTER NOTE
Please inform patient that test result was within normal parameters. A1c improved significantly . I had talked about reducing her metformin. Please inform the patient that she can tale metformin 1 tab twice a day.  Thank you.     Solange Mcgill M.D.

## 2019-10-18 ENCOUNTER — MYC REFILL (OUTPATIENT)
Dept: ENDOCRINOLOGY | Facility: CLINIC | Age: 59
End: 2019-10-18

## 2019-10-18 ENCOUNTER — MYC MEDICAL ADVICE (OUTPATIENT)
Dept: FAMILY MEDICINE | Facility: CLINIC | Age: 59
End: 2019-10-18

## 2019-10-18 DIAGNOSIS — E11.42 TYPE 2 DIABETES MELLITUS WITH DIABETIC POLYNEUROPATHY, WITH LONG-TERM CURRENT USE OF INSULIN (H): ICD-10-CM

## 2019-10-18 DIAGNOSIS — Z79.4 TYPE 2 DIABETES MELLITUS WITH DIABETIC POLYNEUROPATHY, WITH LONG-TERM CURRENT USE OF INSULIN (H): ICD-10-CM

## 2019-10-18 RX ORDER — LANCETS 33 GAUGE
4 EACH MISCELLANEOUS DAILY
Qty: 150 EACH | Refills: 3 | Status: SHIPPED | OUTPATIENT
Start: 2019-10-18 | End: 2019-12-07

## 2019-10-18 NOTE — TELEPHONE ENCOUNTER
Dr. Mcgill,    Patient states you recommended Men B and she did not receive this. Are you still recommending this injection?       LETA DayN, RN

## 2019-10-20 ENCOUNTER — MYC MEDICAL ADVICE (OUTPATIENT)
Dept: FAMILY MEDICINE | Facility: CLINIC | Age: 59
End: 2019-10-20

## 2019-10-24 ENCOUNTER — NURSE TRIAGE (OUTPATIENT)
Dept: NURSING | Facility: CLINIC | Age: 59
End: 2019-10-24

## 2019-10-24 ENCOUNTER — HOSPITAL ENCOUNTER (EMERGENCY)
Facility: CLINIC | Age: 59
Discharge: HOME | End: 2019-10-24
Attending: PHYSICIAN ASSISTANT | Admitting: PHYSICIAN ASSISTANT
Payer: COMMERCIAL

## 2019-10-24 VITALS
BODY MASS INDEX: 21.7 KG/M2 | DIASTOLIC BLOOD PRESSURE: 90 MMHG | HEART RATE: 60 BPM | RESPIRATION RATE: 16 BRPM | SYSTOLIC BLOOD PRESSURE: 164 MMHG | OXYGEN SATURATION: 100 % | TEMPERATURE: 98 F | WEIGHT: 135 LBS
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
## 2019-10-24 PROCEDURE — G0463 HOSPITAL OUTPT CLINIC VISIT: HCPCS | Mod: 25 | Performed by: PHYSICIAN ASSISTANT

## 2019-10-24 PROCEDURE — 90471 IMMUNIZATION ADMIN: CPT | Performed by: PHYSICIAN ASSISTANT

## 2019-10-24 PROCEDURE — 99213 OFFICE O/P EST LOW 20 MIN: CPT | Mod: Z6 | Performed by: PHYSICIAN ASSISTANT

## 2019-10-24 PROCEDURE — 25000128 H RX IP 250 OP 636: Performed by: PHYSICIAN ASSISTANT

## 2019-10-24 PROCEDURE — 90715 TDAP VACCINE 7 YRS/> IM: CPT | Performed by: PHYSICIAN ASSISTANT

## 2019-10-24 RX ADMIN — CLOSTRIDIUM TETANI TOXOID ANTIGEN (FORMALDEHYDE INACTIVATED), CORYNEBACTERIUM DIPHTHERIAE TOXOID ANTIGEN (FORMALDEHYDE INACTIVATED), BORDETELLA PERTUSSIS TOXOID ANTIGEN (GLUTARALDEHYDE INACTIVATED), BORDETELLA PERTUSSIS FILAMENTOUS HEMAGGLUTININ ANTIGEN (FORMALDEHYDE INACTIVATED), BORDETELLA PERTUSSIS PERTACTIN ANTIGEN, AND BORDETELLA PERTUSSIS FIMBRIAE 2/3 ANTIGEN 0.5 ML: 5; 2; 2.5; 5; 3; 5 INJECTION, SUSPENSION INTRAMUSCULAR at 17:19

## 2019-10-24 NOTE — ED PROVIDER NOTES
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  Patient reports that  she has a mouse infestation in her house and she was attempting to empty 1 of the traps that contained what she thought was a dead mouse however went to touch and it bit her right index finger.  She did have a plastic bag covering the finger and states that the wound never had any bleeding however there is a defect of the skin that is noted.  She does not have any pain in the area currently.  No erythema, swelling, distal numbness or paresthesias.  She states concerned that she contacted the nurse advice line who recommended she be evaluated immediately.  She is unsure the date of her last tetanus vaccine.      Allergies:  No Known Allergies    Problem List:    Patient Active Problem List    Diagnosis Date Noted     Drug-induced polyneuropathy (H) 10/14/2019     Priority: Medium     Type 2 diabetes mellitus without complication, without long-term current use of insulin (H) 10/05/2018     Priority: Medium     Graduated from Endocrine Clinic 7/22/2019       Anemia 04/18/2018     Priority: Medium     Thrombocytopenia (H) 04/18/2018     Priority: Medium     Necrotizing pancreatitis 12/09/2017     Priority: Medium     Pancreatic adenocarcinoma (H) 11/29/2017     Priority: Medium     Acute pancreatitis 11/01/2017     Priority: Medium     Leukocytosis 11/01/2017     Priority: Medium     Fatty liver 11/01/2017     Priority: Medium     Seen on US 11/01/2017       Pancreatic mass 11/01/2017     Priority: Medium     On CT abd/pelvis: 3 cm heterogeneous mass within the tail of the pancreas. A few  calcifications are noted along the lateral  margin of the mass.        Past Medical History:    Past Medical History:   Diagnosis Date     Arthritis Post chemo     Diabetes (H)      Necrotizing pancreatitis      Pancreatic cancer (H)      Pneumonia      PONV (postoperative nausea and vomiting)      Past Surgical History:    Past Surgical History:   Procedure Laterality Date     ABDOMEN SURGERY  1983    Ruptured tubal pregnancies, additional surgeries followed     BACK SURGERY  2004    L5, S1 discectomy     BREAST SURGERY  2003    Breast reduction     COLONOSCOPY  2008     COLONOSCOPY N/A 12/11/2018    Procedure: colonoscopy;  Surgeon: Lobito Marte MD;  Location: [facility]     ENDOSCOPIC RETROGRADE CHOLANGIOPANCREATOGRAM N/A 1/25/2018    Procedure: COMBINED ENDOSCOPIC RETROGRADE CHOLANGIOPANCREATOGRAPHY, PLACE TUBE/STENT;  Endoscopic retrograde cholangiopancreatogram with stent placement and cyst drainage bile ;  Surgeon: Vito Sanches MD;  [facility]     ENDOSCOPIC ULTRASOUND LOWER GASTROINTESTIONAL TRACT (GI) N/A 1/25/2018    Procedure: ENDOSCOPIC ULTRASOUND LOWER GASTROINTESTIONAL TRACT (GI);  Endoscopic Ultrasound with guided Cyst-Gastrostomy;  Surgeon: González Metz MD;  [facility]     ENDOSCOPIC ULTRASOUND, ESOPHAGOSCOPY, GASTROSCOPY, DUODENOSCOPY (EGD), NECROSECTOMY N/A 12/4/2017    Procedure: ENDOSCOPIC ULTRASOUND, ESOPHAGOSCOPY, GASTROSCOPY, DUODENOSCOPY (EGD), NECROSECTOMY;  Esophagogastroduodenoscopy, with  Necrosectomy and stents replacement  ;  Surgeon: González Metz MD;  [facility]     ENDOSCOPIC ULTRASOUND, ESOPHAGOSCOPY, GASTROSCOPY, DUODENOSCOPY (EGD), NECROSECTOMY N/A 12/12/2017    Procedure: ENDOSCOPIC ULTRASOUND, ESOPHAGOSCOPY, GASTROSCOPY, DUODENOSCOPY (EGD), NECROSECTOMY;  Esophagogastroduodenoscopy with Necrosectomy, Balloon Dilation, stent removal X3 and Cystgastrostomy stent Placement X2;  Surgeon: Guru Cecilia Staples MD;  [facility]     ESOPHAGOSCOPY, GASTROSCOPY,  DUODENOSCOPY (EGD), COMBINED N/A 2017    Procedure: COMBINED ENDOSCOPIC ULTRASOUND, ESOPHAGOSCOPY, GASTROSCOPY, DUODENOSCOPY (EGD), FINE NEEDLE ASPIRATE/BIOPSY;  Endoscopic Ultrasound with cystgastrostomy and fine needle aspiration ;  Surgeon: González Metz MD;  [facility]     ESOPHAGOSCOPY, GASTROSCOPY, DUODENOSCOPY (EGD), COMBINED N/A 2018    Procedure: COMBINED ESOPHAGOSCOPY, GASTROSCOPY, DUODENOSCOPY (EGD);  EGD;  Surgeon: González Metz MD;  [facility]     ESOPHAGOSCOPY, GASTROSCOPY, DUODENOSCOPY (EGD), COMBINED N/A 2018    Procedure: COMBINED ESOPHAGOSCOPY, GASTROSCOPY, DUODENOSCOPY (EGD), REMOVE FOREIGN BODY;;  Surgeon: González Metz MD;  [facility]     GYN SURGERY  1983    Multiple ectopic pregnancies, reconnect,       INSERT PORT VASCULAR ACCESS Right 2018    Procedure: INSERT PORT VASCULAR ACCESS;  Chest Port Placement - right;  Surgeon: Chanda Lawson PA-C;  [facility]     IR PORT REMOVAL RIGHT  2019     LAPAROSCOPY DIAGNOSTIC (GENERAL) N/A 2018    Procedure: LAPAROSCOPY DIAGNOSTIC (GENERAL);  Diagnostic Laparoscopy; Open Distal Pancreatectomy And Splenectomy, splenic flexure mobilization, cholecystectomy, intraoperative ultrasound;  Surgeon: Ja Byrd MD;  [facility]     PANCREATECTOMY, SPLENECTOMY N/A 2018    Procedure: PANCREATECTOMY, SPLENECTOMY;;  Surgeon: Ja Byrd MD;  [facility]     REMOVE PORT VASCULAR ACCESS Right 2019    Procedure: Right Port Removal;  Surgeon: Juan J Donaldson PA-C;  [facility]       Family History:    Family History   Problem Relation Age of Onset     Heart Disease Father      Hyperlipidemia Father      Heart Disease Brother      Diabetes Mother      Hypertension Mother      Obesity Mother      Depression Mother      Diabetes Maternal Grandfather      Hypertension Maternal Grandfather      Obesity Maternal Grandfather      Diabetes Sister       Hypertension Sister      Depression Sister      Anxiety Disorder Sister      Obesity Sister      Hypertension Brother      Hyperlipidemia Brother      Heart Disease Brother      Breast Cancer Cousin      Breast Cancer Cousin      Breast Cancer Cousin      Colon Cancer Other      Other Cancer Other         Mesothelioma     Depression Sister      Anxiety Disorder Brother      Anxiety Disorder Son      Depression Son      Mental Illness Sister         Schizophrenia     Hypertension Sister      Obesity Maternal Grandmother      Obesity Sister      Diabetes Nephew      Hypertension Brother      Social History:  Marital Status:   [2]  Social History     Tobacco Use     Smoking status: Former Smoker     Packs/day: 0.50     Years: 5.00     Pack years: 2.50     Types: Cigarettes     Start date: 1974     Last attempt to quit:      Years since quittin.8     Smokeless tobacco: Never Used   Substance Use Topics     Alcohol use: No     Alcohol/week: 21.0 standard drinks     Comment: Now quit since Oct 31.  Moderate drinker in past     Drug use: No      Medications:    acetaminophen (TYLENOL) 500 MG tablet  amylase-lipase-protease (CREON) 44719-13945 units CPEP per EC capsule  blood glucose (ONETOUCH VERIO IQ) test strip  clobetasol (TEMOVATE) 0.05 % GEL topical gel  metFORMIN (GLUCOPHAGE-XR) 500 MG 24 hr tablet  ONETOUCH DELICA LANCETS 33G MISC      Review of Systems  CONSTITUTIONAL:NEGATIVE for fever, chills, change in weight  INTEGUMENTARY/SKIN: POSITIVE for animal bite to index finger  NEGATIVE for other worrisome rashes, moles or lesions  RESP:NEGATIVE for significant cough or SOB  MUSCULOSKELETAL: NEGATIVE for significant arthralgias or myalgia  NEURO: NEGATIVE for weakness, numbness or paresthesias  Physical Exam   BP: (!) 164/90  Pulse: 60  Temp: 98  F (36.7  C)  Resp: 16  Weight: 61.2 kg (135 lb)  SpO2: 100 %  Physical Exam  Constitutional:       General: She is not in acute distress.     Appearance:  She is not ill-appearing or toxic-appearing.   HENT:      Head: Normocephalic and atraumatic.   Cardiovascular:      Pulses:           Radial pulses are 2+ on the right side.   Musculoskeletal:      Right wrist: Normal.      Right hand: She exhibits normal range of motion, no tenderness, no bony tenderness, normal two-point discrimination, normal capillary refill, no deformity, no laceration and no swelling.        Hands:    Skin:     General: Skin is warm and dry.      Capillary Refill: Capillary refill takes less than 2 seconds.      Findings: No abrasion, ecchymosis, erythema or laceration.   Neurological:      Mental Status: She is alert.       ED Course        Procedures        Critical Care time:  none            No results found for this or any previous visit (from the past 24 hour(s)).    Medications   Tdap (tetanus-diphtheria-acell pertussis) (ADACEL) injection 0.5 mL (has no administration in time range)     Assessments & Plan (with Medical Decision Making)     I have reviewed the nursing notes.    I have reviewed the findings, diagnosis, plan and need for follow up with the patient.       New Prescriptions    No medications on file     Final diagnoses:   Bitten by mouse, initial encounter     58-year-old female presents to the urgent care with concern over being bitten by a mouse just prior to arrival.  She had elevated blood pressure upon arrival, remainder vital signs within normal limits.  Physical exam findings as described above were significant for a small punctate defect in the epidermis of the distal fingertip.  No evidence of recent bleeding, full-thickness abrasion or laceration.  I discussed case with ER physician Dr. Ismael Coronado who stated that antibiotic prophylaxis is not indicated for this patient.  Her wound was cleaned with Hibiclens, saline and her tetanus vaccine was updated.  She was instructed to monitor the wound for increasing redness, pain, swelling, discharge or other signs of  infection and prompt follow-up as needed only if symptoms develop    Disclaimer: This note consists of symbols derived from keyboarding, dictation, and/or voice recognition software. As a result, there may be errors in the script that have gone undetected.  Please consider this when interpreting information found in the chart.    10/24/2019   [facility] EMERGENCY DEPARTMENT     Natalie Regan PA-C  10/25/19 1037

## 2019-10-24 NOTE — ED AVS SNAPSHOT
[facility] Emergency Department  [address] [address]  Phone:  [phone]  Fax:  [phone]                                    Kitty Bangura   MRN: [id number]    Department:  [facility] Emergency Department   Date of Visit:  10/24/2019           After Visit Summary Signature Page    I have received my discharge instructions, and my questions have been answered. I have discussed any challenges I see with this plan with the nurse or doctor.    ..........................................................................................................................................  Patient/Patient Representative Signature      ..........................................................................................................................................  Patient Representative Print Name and Relationship to Patient    ..................................................               ................................................  Date                                   Time    ..........................................................................................................................................  Reviewed by Signature/Title    ...................................................              ..............................................  Date                                               Time          22EPIC Rev 08/18

## 2019-10-24 NOTE — TELEPHONE ENCOUNTER
"Kitty was bitten by a mouse in her home after she attempted to pick-up a mouse trap. The bite is not actively bleeding, but is open. The bite is very small puncture wound that broke the skin. 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 Triage protocol recommends she is seen within 4 hrs. Kitty plans to go to the [facility].   Chela Schultz, RN, BSN  [facility]      Reason for Disposition    [1] Puncture wound or small cut AND [2] on hands or genitals    Additional Information    Negative: [1] Major bleeding (e.g., actively dripping or spurting) AND [2] can't be stopped    Negative: Sounds like a life-threatening emergency to the triager    Negative: Snake bite    Negative: Bite, wound, or sting from fish    Negative: [1] Any break in skin (e.g., cut, puncture or scratch) AND[2] wild animal at risk for RABIES (e.g., bat, raccoon, abel, skunk, coyote, other carnivores)    Negative: [1] Any break in skin (e.g., cut, puncture or scratch) AND[2] dog, cat, or ferret at risk for RABIES (e.g., sick, stray, unprovoked bite, developing country)    Negative: [1] Any break in skin (e.g., cut, puncture or scratch) AND[2] monkey    Negative: [1] Cut (length > 1/8 inch or 3 mm) or skin tear AND[2] any animal    Negative: [1] Bleeding AND [2] won't stop after 10 minutes of direct pressure (using correct technique)    Negative: Description of bite sounds severe to the triager    Negative: [1] Non-bite body fluid contact (e.g., saliva, brain) AND [2] onto open cut/wound or mucous membranes AND[3] animal at high-risk for RABIES (e.g., bat, raccoon, abel, skunk, coyote, other carnivores)    Negative: [1] Puncture wound (hole through the skin) from claws or teeth AND [2] cat    Negative: [1] Puncture wound or small cut AND [2] on face    Protocols used: ANIMAL BITE-A-AH      "

## 2019-10-28 NOTE — TELEPHONE ENCOUNTER
Yes she should get the Men B. We have Bexsero in clinic and it is a two series vaccine. Please inform patient to call for a nurse only visit for this vaccination because of the absence of a spleen in her.

## 2019-10-28 NOTE — TELEPHONE ENCOUNTER
Do you want the patient to be seen on the RN schedule or LPN schedule for Bexsero?    Routing to provider.    Olga Lidia DIXON Rn

## 2019-11-12 ENCOUNTER — ALLIED HEALTH/NURSE VISIT (OUTPATIENT)
Dept: FAMILY MEDICINE | Facility: CLINIC | Age: 59
End: 2019-11-12
Payer: COMMERCIAL

## 2019-11-12 DIAGNOSIS — Z23 NEED FOR VACCINATION: Primary | ICD-10-CM

## 2019-11-12 PROCEDURE — 90471 IMMUNIZATION ADMIN: CPT

## 2019-11-12 PROCEDURE — 99207 ZZC NO CHARGE NURSE ONLY: CPT

## 2019-11-12 PROCEDURE — 90620 MENB-4C VACCINE IM: CPT

## 2019-11-12 NOTE — NURSING NOTE
"Chief Complaint   Patient presents with     Imm/Inj     Men B (Bexsero). Confirmed with CDC by telephone patient only needs one dose of Men B (has had Menactra in April 2018).       Initial There were no vitals taken for this visit. Estimated body mass index is 21.7 kg/m  as calculated from the following:    Height as of 10/14/19: 1.68 m (5' 6.14\").    Weight as of 10/24/19: 61.2 kg (135 lb).    Medication Reconciliation:  unable or not appropriate to perform    Ana Maria Cordero CMA (AAMA)    Prior to immunization administration, verified patients identity using patient s name and date of birth. Please see Immunization Activity for additional information.     Screening Questionnaire for Adult Immunization    Are you sick today?   No   Do you have allergies to medications, food, a vaccine component or latex?   No   Have you ever had a serious reaction after receiving a vaccination?   No   Do you have a long-term health problem with heart disease, lung disease, asthma, kidney disease, metabolic disease (e.g. diabetes), anemia, or other blood disorder?   No   Do you have cancer, leukemia, HIV/AIDS, or any other immune system problem?   YES   In the past 3 months, have you taken medications that affect  your immune system, such as prednisone, other steroids, or anticancer drugs; drugs for the treatment of rheumatoid arthritis, Crohn s disease, or psoriasis; or have you had radiation treatments?   No   Have you had a seizure, or a brain or other nervous system problem?   No   During the past year, have you received a transfusion of blood or blood     products, or been given immune (gamma) globulin or antiviral drug?   No   For women: Are you pregnant or is there a chance you could become        pregnant during the next month?   No   Have you received any vaccinations in the past 4 weeks?   No     Immunization questionnaire answers were all negative.        Per orders of Dr. Mcgill, injection of MEN B (Bexsero) given by " Ana Maria Cordero CMA. Patient instructed to remain in clinic for 15 minutes afterwards, and to report any adverse reaction to me immediately.       Screening performed by Ana Maria Cordero CMA on 11/12/2019 at 2:30 PM.

## 2019-11-18 NOTE — PROGRESS NOTES
"  Otolaryngology Clinic      Name: Kitty Bangura  MRN: [id number]  Age: 58 year old  : 2019      Chief Complaint:   RECHECK     History of Present Illness: Kitty Bangura is a 58 year old female with a history of a left-tongue ulcer who presents for follow up. At the time of the patient's last evaluation on 2019 she had a history and exam consistent with oral lichen planus and she was prescribed clobetasol BID. Here the patient states that her mouth improved after the clobetasol treatment but then returns as soon as she stops this treatment. The initiation of these symptoms were not associated with any medication changes. The patient has been using baking soda and salt rinses as well as biotin. The patient notes that she has been under stress in preparation for the holidays.     Review of Systems:   Pertinent items are noted in HPI or as in patient entered ROS below, remainder of complete ROS is negative.     Physical Exam:   Pulse 91   Resp 15   Ht 1.676 m (5' 6\")   Wt 62 kg (136 lb 11.2 oz)   SpO2 95%   BMI 22.06 kg/m       PHYSICAL EXAMINATION:    Constitutional:  The patient was unaccompanied, well-groomed, and in no acute distress.    Skin:  Warm and pink.    Neurologic:  Alert and oriented x 3.  CN's III-XII within normal limits.  Voice normal.   Psychiatric:  The patient's affect was calm, cooperative, and appropriate.    Respiratory:  Breathing comfortably without stridor or exertion of accessory muscles.    Eyes: Extraocular movement intact.    Head:  Normocephalic and atraumatic.  No lesions or scars.    Ears:  Pinnae and tragus non-tender.    OC/OP: Lichenoid changes to the left ventral side tongue, less than last time, about 7 mm ulceration present. No abnormal lymph tissue in the oropharynx.  The pterygoid region is non-tender.    Neck:  Supple with normal laryngeal and tracheal landmarks.  The parotid beds were without masses.  No palpable thyroid.  Lymphatic:  There " is no palpable lymphadenopathy in the neck.     Assessment and Plan:  58 year old female with a history of a left-tongue ulcer and what I suspect is lichens planus. She has had persistence of her symptoms but has had slight improvement on exam today. I applied gentian violet to the area and prescribed her to use this at home a couple times a week as needed. Also use clobetasol up to 2-3 times a week as needed. Also provided dietary precautions. No indication for biopsy, but do want her to come back in 4 months. Return sooner if she develops a lesion.     Scribe Disclosure:  I, Diego Vergara, am serving as a scribe to document services personally performed by Enrique Ward MD at this visit, based upon the provider's statements to me. All documentation has been reviewed by the aforementioned provider prior to being entered into the official medical record.

## 2019-11-19 ENCOUNTER — OFFICE VISIT (OUTPATIENT)
Dept: OTOLARYNGOLOGY | Facility: CLINIC | Age: 59
End: 2019-11-19
Payer: COMMERCIAL

## 2019-11-19 VITALS
OXYGEN SATURATION: 95 % | HEART RATE: 91 BPM | HEIGHT: 66 IN | RESPIRATION RATE: 15 BRPM | WEIGHT: 136.7 LBS | BODY MASS INDEX: 21.97 KG/M2

## 2019-11-19 DIAGNOSIS — K12.30 STOMATITIS AND MUCOSITIS: Primary | ICD-10-CM

## 2019-11-19 DIAGNOSIS — K12.1 STOMATITIS AND MUCOSITIS: Primary | ICD-10-CM

## 2019-11-19 ASSESSMENT — MIFFLIN-ST. JEOR: SCORE: 1206.82

## 2019-11-19 ASSESSMENT — PAIN SCALES - GENERAL: PAINLEVEL: NO PAIN (0)

## 2019-11-19 NOTE — LETTER
"2019       RE: Kitty Bangura  [address] [address]     Dear Colleague,    Thank you for referring your patient, Kitty Bangura, to the [facility] EAR NOSE AND THROAT at [facility]. Please see a copy of my visit note below.    Otolaryngology Clinic      Name: Kitty Bangura  MRN: [id number]  Age: 58 year old  : 2019      Chief Complaint:   RECHECK     History of Present Illness: Kitty Bangura is a 58 year old female with a history of a left-tongue ulcer who presents for follow up. At the time of the patient's last evaluation on 2019 she had a history and exam consistent with oral lichen planus and she was prescribed clobetasol BID. Here the patient states that her mouth improved after the clobetasol treatment but then returns as soon as she stops this treatment. The initiation of these symptoms were not associated with any medication changes. The patient has been using baking soda and salt rinses as well as biotin. The patient notes that she has been under stress in preparation for the holidays.     Review of Systems:   Pertinent items are noted in HPI or as in patient entered ROS below, remainder of complete ROS is negative.     Physical Exam:   Pulse 91   Resp 15   Ht 1.676 m (5' 6\")   Wt 62 kg (136 lb 11.2 oz)   SpO2 95%   BMI 22.06 kg/m        PHYSICAL EXAMINATION:    Constitutional:  The patient was unaccompanied, well-groomed, and in no acute distress.    Skin:  Warm and pink.    Neurologic:  Alert and oriented x 3.  CN's III-XII within normal limits.  Voice normal.   Psychiatric:  The patient's affect was calm, cooperative, and appropriate.    Respiratory:  Breathing comfortably without stridor or exertion of accessory muscles.    Eyes: Extraocular movement intact.    Head:  Normocephalic and atraumatic.  No lesions or scars.    Ears:  Pinnae and tragus non-tender.    OC/OP: Lichenoid changes to the left " ventral side tongue, less than last time, about 7 mm ulceration present. No abnormal lymph tissue in the oropharynx.  The pterygoid region is non-tender.    Neck:  Supple with normal laryngeal and tracheal landmarks.  The parotid beds were without masses.  No palpable thyroid.  Lymphatic:  There is no palpable lymphadenopathy in the neck.     Assessment and Plan:  58 year old female with a history of a left-tongue ulcer and what I suspect is lichens planus. She has had persistence of her symptoms but has had slight improvement on exam today. I applied gentian violet to the area and prescribed her to use this at home a couple times a week as needed. Also use clobetasol up to 2-3 times a week as needed. Also provided dietary precautions. No indication for biopsy, but do want her to come back in 4 months. Return sooner if she develops a lesion.     Scribe Disclosure:  I, Diego Vergara, am serving as a scribe to document services personally performed by Enrique Ward MD at this visit, based upon the provider's statements to me. All documentation has been reviewed by the aforementioned provider prior to being entered into the official medical record.     Again, thank you for allowing me to participate in the care of your patient.      Sincerely,    Enrique Ward MD

## 2019-11-19 NOTE — NURSING NOTE
"Chief Complaint   Patient presents with     RECHECK     Stomatitis and mucositis     Pulse 91   Resp 15   Ht 1.676 m (5' 6\")   Wt 62 kg (136 lb 11.2 oz)   SpO2 95%   BMI 22.06 kg/m      Hector Rueda CMA    "

## 2019-11-19 NOTE — PATIENT INSTRUCTIONS
1. You were seen in the ENT Clinic today by Dr. Ward  If you have any questions or concerns after your appointment, please call   - Option 1: ENT Clinic: [phone]  - Option 2: [facility] Nurse Coordinator: [phone]    2. Plan: Gentian Violet 1%, antiflammatory dye. Use a couple times a week as needed. Steroid cream can also be used 2-3 times a week. Return to clinic in 4 months sooner if needed    Thank you for allowing us to be apart of your care!  Jaymie Alfaro LPN

## 2019-12-07 ENCOUNTER — MYC REFILL (OUTPATIENT)
Dept: ENDOCRINOLOGY | Facility: CLINIC | Age: 59
End: 2019-12-07

## 2019-12-07 DIAGNOSIS — Z79.4 TYPE 2 DIABETES MELLITUS WITH DIABETIC POLYNEUROPATHY, WITH LONG-TERM CURRENT USE OF INSULIN (H): ICD-10-CM

## 2019-12-07 DIAGNOSIS — E11.42 TYPE 2 DIABETES MELLITUS WITH DIABETIC POLYNEUROPATHY, WITH LONG-TERM CURRENT USE OF INSULIN (H): ICD-10-CM

## 2019-12-08 RX ORDER — LANCETS 33 GAUGE
4 EACH MISCELLANEOUS DAILY
Qty: 150 EACH | Refills: 3 | Status: SHIPPED | OUTPATIENT
Start: 2019-12-08

## 2019-12-13 ENCOUNTER — HOSPITAL ENCOUNTER (OUTPATIENT)
Dept: CT IMAGING | Facility: CLINIC | Age: 59
Discharge: HOME | End: 2019-12-13
Attending: INTERNAL MEDICINE | Admitting: INTERNAL MEDICINE
Payer: COMMERCIAL

## 2019-12-13 ENCOUNTER — HOSPITAL ENCOUNTER (OUTPATIENT)
Dept: LAB | Facility: CLINIC | Age: 59
End: 2019-12-13
Attending: INTERNAL MEDICINE
Payer: COMMERCIAL

## 2019-12-13 DIAGNOSIS — C25.9 MALIGNANT NEOPLASM OF PANCREAS, UNSPECIFIED LOCATION OF MALIGNANCY (H): ICD-10-CM

## 2019-12-13 LAB
CREAT BLD-MCNC: 0.7 MG/DL (ref 0.52–1.04)
GFR SERPL CREATININE-BSD FRML MDRD: 85 ML/MIN/{1.73_M2}

## 2019-12-13 PROCEDURE — 71260 CT THORAX DX C+: CPT

## 2019-12-13 PROCEDURE — 74177 CT ABD & PELVIS W/CONTRAST: CPT

## 2019-12-13 PROCEDURE — 86301 IMMUNOASSAY TUMOR CA 19-9: CPT | Performed by: INTERNAL MEDICINE

## 2019-12-13 PROCEDURE — 82565 ASSAY OF CREATININE: CPT

## 2019-12-13 PROCEDURE — 25000128 H RX IP 250 OP 636: Performed by: RADIOLOGY

## 2019-12-13 PROCEDURE — 25000125 ZZHC RX 250: Performed by: RADIOLOGY

## 2019-12-13 PROCEDURE — 36415 COLL VENOUS BLD VENIPUNCTURE: CPT | Performed by: INTERNAL MEDICINE

## 2019-12-13 RX ORDER — IOPAMIDOL 755 MG/ML
67 INJECTION, SOLUTION INTRAVASCULAR ONCE
Status: COMPLETED | OUTPATIENT
Start: 2019-12-13 | End: 2019-12-13

## 2019-12-13 RX ADMIN — SODIUM CHLORIDE 57 ML: 9 INJECTION, SOLUTION INTRAVENOUS at 09:23

## 2019-12-13 RX ADMIN — IOPAMIDOL 67 ML: 755 INJECTION, SOLUTION INTRAVENOUS at 09:23

## 2019-12-14 LAB — CANCER AG19-9 SERPL-ACNC: 2 U/ML (ref 0–37)

## 2019-12-18 NOTE — PROGRESS NOTES
Oncology Follow-up Visit:  Dec 20, 2019    Cancer diagnosis: cystic pancreatic tail adenocarcinoma  small subcentimeter pulmonary nodules seen on staging scans of unclear etiology.     Treatment to date: neoadjuvant FOLFIRINOX x4 cycles, 1/15/18-3/6/18  Difficulty tolerating neoadjuvant intent chemotherapy. Distal pancreatectomy performed April 17, 2018, no residual carcinoma seen on operative pathology.  Treated with four cycles of adjuvant chemotherapy with gemcitabine and Xeloda, completed September 2018.     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     Referring physician: Dr. González Metz MD      Oncology History: She was previously healthy until she presented in early November 2017 with abdominal pain. CT abdomen on 11/1/17 showed acute pancreatitis (lipase 41,960) and a 3cm heterogenous pancreatic tail mass. The etiology of pancreatitis is unclear - no obstructing gallstone and not a heavy drinker. She was hospitalized for one week and followed up with Dr. González Metz in GI clinic.     11/29/17 -- endoscopic drainage of walled-off area of pancreatic necrosis by Dr. Metz. During the same procedure she had an EUS FNA of the pancreatic tail mass and pathology showed adenocarcinoma. The mass measured 33 x 27 mm, encapsulated with solid and cystic components, rim calcification, and no evidence of invasion.     12/9/17 -- Staging CT chest/abd/pelvis showed several small pulmonary nodules (largest 3mm), unchanged mass in the pancreatic tail, mildly prominent mesenteric nodes thought likely reactive, and small right hepatic lobe hypodensities. Abdominal MRI on 12/10/17 showed hepatic hemangiomas, no evidence of metastatic disease to the liver.     1/8/18 - - oncology consultation, Dr. Monk. Recommendation: neoadjuvant intent  chemotherapy with FOLFIRINOX.    1/15/18 - - cycle 1/day one FOLFIRINOX chemotherapy.    1/20 through 1/27/18 - - hospitalization at the [facility], for acute pancreatitis. Following three days of nausea, vomiting, pain. During this hospitalization: ERCP was attempted by Dr. Sanches with pancreatic stent placement, but pancreatic duct was completely obstructed and wire was unable to pass beyond the duct. Dr. Metz performed successful US guided cyst gastrostomy January 25, 2018.    1/31/18 - - oncology follow-up, DANTE Talavera. Neutropenic with ANC 0.4, thus defer cycle two of chemotherapy.    2/5/18 - - oncology follow-up, DANTE Junior.  Cycle 2/day one FOLFIRINOX chemotherapy. Patient endorses cold sensitivity secondary to oxaliplatin. Neulasta given for growth factor support. Due to significant neutropenia with cycle one.    2/11/18 - - ER evaluation for epigastric pain. Discharged to home from ER. Leukocytosis secondary to Neulasta given on February 8.    2/20/18 - - oncology follow-up, DANTE Junior. Cycle 3/day one FOLFIRINOX given without Neulasta. At patient request, oxaliplatin dose reduced by 10% due to neuropathy/cold sensitivity.    3/6/18 - - oncology follow-up, DANTE Talavera. Cycle 4/day one FOLFIRINOX chemotherapy.    3/13/18 - - CT chest/abdomen/pelvis - - IMPRESSION: 1. Minimal decrease in size and marked decrease in enhancement and pancreatic mass since the comparison study. 2. No significant change in low dense lesions in the liver since comparison.    3/16/18 - - oncology follow-up Dr. Monk. Plan is to hold on further chemotherapy as surgery is scheduled for 4/17/18 4/17/18 - - surgery: PROCEDURE: Diagnostic laparoscopy, splenic flexure mobilization, intraoperative ultrasound, distal pancreatectomy, splenectomy, and cholecystectomy. SURGEON: Ja Byrd MD  Final surgical pathology showed necrotizing pancreatitis, no evidence of residual  cancer, complete pathologic response, 26 benign lymph nodes.  FINAL DIAGNOSIS: A. DISTAL PANCREAS, SPLEEN AND OMENTUM, RESECTION: - Necrotizing pancreatitis with residual acellular mucin and foci of high grade dysplasia, status post neoadjuvant therapy   - Treatment response: complete (score 0)   - Twenty-six benign lymph nodes (0/26)   - Negative for residual carcinoma   - Pathologic staging: [id number]   - Focal infarction, metaplastic bone formation - Surgical margins are free of neoplasia - Unremarkable spleen and omentum   B. GALLBLADDER, CHOLECYSTECTOMY: - Benign gallbladder, biliary mucosa with no significant histologic abnormality - Negative for dysplasia COMMENT: Histologic sections demonstrate pools of acellular mucin, necrosis and scattered high grade dysplasia (PanIN-3) with no evidence of residual invasive carcinoma    4/30/18 - - surgical oncology follow-up, Dr. Byrd.  5/4/18 - - palliative care follow-up Dr. Snow. No specific recommendations required at this time.  5/14/18 - - oncology follow-up, Dr. Monk. Plan: adjuvant chemotherapy with gemcitabine and Xeloda.  5/30/18 - - cycle 1/day one adjuvant chemotherapy with gemcitabine and Xeloda.  Cycle three was dose reduced due to mucositis secondary to Xeloda.  8/28/18 - - oncology follow-up, DANTE Talavera. Cycle 4/day one gemcitabine and Xeloda.  9/17/18 - - CT chest/abdomen/pelvis - -IMPRESSION:  Resolving postoperative changes. No new findings or evidence of metastatic disease.  9/24/18 - - oncology follow-up, Dr. Monk. PLAN: initiate active surveillance.  12/21/18--CT c/a/p--IMPRESSION:  1. Stable tiny 2 to 3 mm lung nodules as described above. This  suggests a benign etiology.  2. Two low density liver lesions. These appear to vary in appearance  depending on slight variations in timing of the bolus of contrast.  Likely no significant change. Recommend continued close surveillance.  3. No evidence of recurrent mass in the abdomen or pelvis.  No  adenopathy.  12/28/18--scheduled oncology follow-up, Dr Monk. PATIENT NO-SHOW.  12/31/18--oncology follow-up, Dr Monk. Plan: Continue surveillance.  Increase Creon dose due to fatty food intolerance/weight loss.    3/29/19--CT c/a/p-- IMPRESSION: Stable scan compared to 12/21/2018 with the following  findings:  1. Several bilateral small pulmonary nodules, unchanged in appearance.  2. Two right hepatic small hypodense lesions, possibly hemangiomas.  These are also stable in appearance.  3. No apparent recurrent mass.  4/5/19 - - oncology follow-up, Dr. Monk.     7/1/19--CT c/a/p--                                                                IMPRESSION:  Stable examination with no convincing metastatic or  recurrent neoplasm.  7/8/19--oncology follow-up, Dr. Monk.    9/30/19 - - CT chest/abdomen/pelvis - - IMPRESSION:  1.  There is a small grouped area of nodularity in the right middle  lobe. Grouped nature favors an inflammatory etiology.  2.  The abdomen CT is stable with no convincing metastatic or  recurrent neoplasm.  10/7/19 - - oncology follow-up, Dr. Monk.    12/13/19 - - CT chest/abdomen/pelvis - - IMPRESSION:  1.  Stable abdomen/pelvis CT with no convincing metastatic or  recurrent neoplasm.  2. New/additional small focus of grouped nodularity in the right  middle lobe. Inflammatory etiology is still favored.  12/20/19 - - oncology follow-up, Dr. Monk.          Interim History:  Ms. Bangura returns to the clinic today with her .  She is more than a year and a half out from her distal pancreatectomy, and also she is approximately 15 months out from completion of adjuvant chemotherapy.  She is feeling well.  She has occasional abdominal cramping exacerbated by food; for example, red meat, which she takes in rarely.  This has not changed since the last visit with me over the last 6 months.  She has been using Creon.  At some point in the last year, I had advised taking more Creon, and she has been  taking 4 capsules with meals and 2 with snacks.  She feels this does help somewhat moderately, but not completely for the issue.  She does not have any cramping when she has bowel movements and is denying any melena or hematochezia.  She has a separate non-cancer-related issue with an ulcer on the base of her tongue, for which she has been seeing our Otolaryngology team.  She had a followup surveillance CT chest, abdomen and pelvis due to her history of pancreatic cancer.  This was done on , and this showed no recurrent malignancy, and there is an area of inflammation in the right middle lobe.  Her  is unremarkable as well.  She is not having any pain at this time.            Review Of Systems:  Comprehensive 14-point ROS reviewed. Pertinent symptoms reviewed above per HPI.    PAST MEDICAL HISTORY:   Pre-diabetes  Pancreatitis as above     PAST SURGICAL HISTORY:  Laparotomy x2 for ruptured tubal pregnancies    Discectomy for herniated lumbar disk  Breast reduction surgery     FAMILY HISTORY:  Colon cancer in maternal aunt  Paternal aunt and cousins with breast cancer  CAD in father and brother     SH: , from [place], with 30+ yr-old son. Works as . former smoker, quit 30 years ago. two glasses of wine per night, none since pancreatitis diagnosis     Allergies: none      Current Outpatient Medications:      acetaminophen (TYLENOL) 500 MG tablet, Take 2 tablets (1,000 mg) by mouth every 8 hours, Disp: 100 tablet, Rfl: 1     amylase-lipase-protease (CREON) 97615-96706 units CPEP per EC capsule, Take 2-3 with meals / 1-2 with snacks, up to 15 per day., Disp: 1350 capsule, Rfl: 3     blood glucose (ONETOUCH VERIO IQ) test strip, Use to test blood sugar 4 times daily or as directed., Disp: 400 each, Rfl: 3     clobetasol (TEMOVATE) 0.05 % GEL topical gel, Apply topically 2 times daily, Disp: 15 g, Rfl: 1     metFORMIN (GLUCOPHAGE-XR) 500 MG 24 hr tablet, Take 1 tablet 3 times a day  "with meals, Disp: 270 tablet, Rfl: 3     OneTouch Delica Lancets 33G MISC, 4 lancets daily, Disp: 150 each, Rfl: 3  No current facility-administered medications for this visit.     Facility-Administered Medications Ordered in Other Visits:      heparin 100 UNIT/ML injection 5 mL, 5 mL, Intracatheter, Once, Art Guevara MD      Physical Exam:  BP 90/64   Pulse 64   Temp 97.7  F (36.5  C) (Oral)   Ht 1.676 m (5' 6\")   Wt 61.5 kg (135 lb 8 oz)   SpO2 99%   BMI 21.87 kg/m      KPS 90  GENERAL:  Well-appearing, older  woman in no acute distress, alert and oriented x3.   HEENT:  Anicteric sclerae.  Oropharynx without mucositis or thrush.  No jaundice of the frenulum.     CARDIOVASCULAR:  Regular rate and rhythm, normal S1 and S2, no murmurs, gallops or rubs.   LUNGS:  Clear to auscultation throughout.   GI/ABDOMEN:  Soft, with no tenderness. The midline surgical scar is well healed.  No fluctuance, erythema or tenderness.  No palpable masses, splenomegaly or ascites.   EXTREMITIES:  No evidence of lower extremity edema.           Laboratory/Imaging Studies  Results for EZEQUIEL BANGURA (MRN [id number]) as of 12/19/2019 08:56   Ref. Range 3/29/2019 09:04 7/1/2019 08:45 9/30/2019 09:25 12/13/2019 08:48   Cancer Antigen 19-9 Latest Ref Range: 0 - 37 U/mL 2 2 3 2       Prior to the visit, I reviewed the CT chest, abdomen and pelvis personally.  I reviewed the examination and the images.  I printed out a copy of the full report.  I reviewed it in full with the patient and her  and gave them a printed copy of the laboratory values as well as the CT scan today.         ASSESSMENT/PLAN:  Mrs. Bangura is a 59 year old woman with resected pancreatic tail adenocarcinoma.  She had initial extensive and severe necrotizing pancreatitis upon initial diagnosis and hospitalization that delayed treatment.  We ultimately were able to begin neoadjuvant FOLFIRINOX for 4 cycles beginning in January.  By the " time she had pancreatectomy by Dr. Byrd in mid April there was no evidence of residual carcinoma seen on operative pathology.  She completed subsequently 4 months of gemcitabine and Xeloda in the adjuvant setting.       She is more than a year and a half out from her distal pancreatectomy.  She is 15 months out from completion of adjuvant chemotherapy.  She is doing well without recurrent disease.  I congratulated her once again.  I reminded her that she had an extremely rare, exceptional response to chemotherapy to the point where there was no residual carcinoma seen on the tumor explant at the time of the distal pancreatectomy in 04/2018.  For that reason, I offered a 3-4 month surveillance followup.  She opted for after 04/01, as it is when she will have Medicare benefits.  I think that is totally fine, and in that parameter, I have ordered a CT chest, abdomen and pelvis, , CBC and CMP to be performed 1 week prior to seeing me, and then she will see me to review the results at that time and let us know in the interim should she have any questions or concerns.  I offered a referral to our Gastroenterology team should she have any concerns or want formal consultation for indigestion and dyspepsia issues, and I also offered her followup with our Nutrition staff as well.         I spent 30 minutes in consultation, including H&P and Discussion. >50% of time was spent in counseling and in coordination of care.    Jovany Monk MD, PhD

## 2019-12-20 ENCOUNTER — ONCOLOGY VISIT (OUTPATIENT)
Dept: ONCOLOGY | Facility: CLINIC | Age: 59
End: 2019-12-20
Attending: INTERNAL MEDICINE
Payer: COMMERCIAL

## 2019-12-20 VITALS
HEART RATE: 64 BPM | DIASTOLIC BLOOD PRESSURE: 64 MMHG | HEIGHT: 66 IN | SYSTOLIC BLOOD PRESSURE: 90 MMHG | BODY MASS INDEX: 21.78 KG/M2 | OXYGEN SATURATION: 99 % | TEMPERATURE: 97.7 F | WEIGHT: 135.5 LBS

## 2019-12-20 DIAGNOSIS — C25.9 MALIGNANT NEOPLASM OF PANCREAS, UNSPECIFIED LOCATION OF MALIGNANCY (H): Primary | ICD-10-CM

## 2019-12-20 PROCEDURE — G0463 HOSPITAL OUTPT CLINIC VISIT: HCPCS | Mod: ZF

## 2019-12-20 PROCEDURE — 99214 OFFICE O/P EST MOD 30 MIN: CPT | Mod: ZP | Performed by: INTERNAL MEDICINE

## 2019-12-20 ASSESSMENT — MIFFLIN-ST. JEOR: SCORE: 1196.37

## 2019-12-20 ASSESSMENT — PAIN SCALES - GENERAL: PAINLEVEL: MODERATE PAIN (4)

## 2019-12-20 NOTE — LETTER
12/20/2019       RE: Kitty Bangura  [id number] [facility] [id number]     Dear Colleague,    Thank you for referring your patient, Kitty Bangura, to the [facility]. Please see a copy of my visit note below.    Oncology Follow-up Visit:  Dec 20, 2019    Cancer diagnosis: cystic pancreatic tail adenocarcinoma  small subcentimeter pulmonary nodules seen on staging scans of unclear etiology.     Treatment to date: neoadjuvant FOLFIRINOX x4 cycles, 1/15/18-3/6/18  Difficulty tolerating neoadjuvant intent chemotherapy. Distal pancreatectomy performed April 17, 2018, no residual carcinoma seen on operative pathology.  Treated with four cycles of adjuvant chemotherapy with gemcitabine and Xeloda, completed September 2018.     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     Referring physician: Dr. González Metz MD  Oncology History: She was previously healthy until she presented in early November 2017 with abdominal pain. CT abdomen on 11/1/17 showed acute pancreatitis (lipase 41,960) and a 3cm heterogenous pancreatic tail mass. The etiology of pancreatitis is unclear - no obstructing gallstone and not a heavy drinker. She was hospitalized for one week and followed up with Dr. González Metz in GI clinic.     11/29/17 -- endoscopic drainage of walled-off area of pancreatic necrosis by Dr. Metz. During the same procedure she had an EUS FNA of the pancreatic tail mass and pathology showed adenocarcinoma. The mass measured 33 x 27 mm, encapsulated with solid and cystic components, rim calcification, and no evidence of invasion.     12/9/17 -- Staging CT chest/abd/pelvis showed several small pulmonary nodules (largest 3mm), unchanged mass in the pancreatic tail, mildly prominent mesenteric nodes thought likely  reactive, and small right hepatic lobe hypodensities. Abdominal MRI on 12/10/17 showed hepatic hemangiomas, no evidence of metastatic disease to the liver.     1/8/18 - - oncology consultation, Dr. Monk. Recommendation: neoadjuvant intent chemotherapy with FOLFIRINOX.    1/15/18 - - cycle 1/day one FOLFIRINOX chemotherapy.    1/20 through 1/27/18 - - hospitalization at the [facility], for acute pancreatitis. Following three days of nausea, vomiting, pain. During this hospitalization: ERCP was attempted by Dr. Sanches with pancreatic stent placement, but pancreatic duct was completely obstructed and wire was unable to pass beyond the duct. Dr. Metz performed successful US guided cyst gastrostomy January 25, 2018.    1/31/18 - - oncology follow-up, DANTE Talavera. Neutropenic with ANC 0.4, thus defer cycle two of chemotherapy.    2/5/18 - - oncology follow-up, DANTE Junior.  Cycle 2/day one FOLFIRINOX chemotherapy. Patient endorses cold sensitivity secondary to oxaliplatin. Neulasta given for growth factor support. Due to significant neutropenia with cycle one.    2/11/18 - - ER evaluation for epigastric pain. Discharged to home from ER. Leukocytosis secondary to Neulasta given on February 8.    2/20/18 - - oncology follow-up, DANTE Junior. Cycle 3/day one FOLFIRINOX given without Neulasta. At patient request, oxaliplatin dose reduced by 10% due to neuropathy/cold sensitivity.    3/6/18 - - oncology follow-up, DANTE Talavera. Cycle 4/day one FOLFIRINOX chemotherapy.    3/13/18 - - CT chest/abdomen/pelvis - - IMPRESSION: 1. Minimal decrease in size and marked decrease in enhancement and pancreatic mass since the comparison study. 2. No significant change in low dense lesions in the liver since comparison.    3/16/18 - - oncology follow-up Dr. Monk. Plan is to hold on further chemotherapy as surgery is scheduled for 4/17/18 4/17/18 - - surgery: PROCEDURE: Diagnostic  laparoscopy, splenic flexure mobilization, intraoperative ultrasound, distal pancreatectomy, splenectomy, and cholecystectomy. SURGEON: Ja Byrd MD  Final surgical pathology showed necrotizing pancreatitis, no evidence of residual cancer, complete pathologic response, 26 benign lymph nodes.  FINAL DIAGNOSIS: A. DISTAL PANCREAS, SPLEEN AND OMENTUM, RESECTION: - Necrotizing pancreatitis with residual acellular mucin and foci of high grade dysplasia, status post neoadjuvant therapy   - Treatment response: complete (score 0)   - Twenty-six benign lymph nodes (0/26)   - Negative for residual carcinoma   - Pathologic staging: [id number]   - Focal infarction, metaplastic bone formation - Surgical margins are free of neoplasia - Unremarkable spleen and omentum   B. GALLBLADDER, CHOLECYSTECTOMY: - Benign gallbladder, biliary mucosa with no significant histologic abnormality - Negative for dysplasia COMMENT: Histologic sections demonstrate pools of acellular mucin, necrosis and scattered high grade dysplasia (PanIN-3) with no evidence of residual invasive carcinoma    4/30/18 - - surgical oncology follow-up, Dr. Byrd.  5/4/18 - - palliative care follow-up Dr. Snow. No specific recommendations required at this time.  5/14/18 - - oncology follow-up, Dr. Monk. Plan: adjuvant chemotherapy with gemcitabine and Xeloda.  5/30/18 - - cycle 1/day one adjuvant chemotherapy with gemcitabine and Xeloda.  Cycle three was dose reduced due to mucositis secondary to Xeloda.  8/28/18 - - oncology follow-up, DANTE Talavera. Cycle 4/day one gemcitabine and Xeloda.  9/17/18 - - CT chest/abdomen/pelvis - -IMPRESSION:  Resolving postoperative changes. No new findings or evidence of metastatic disease.  9/24/18 - - oncology follow-up, Dr. Monk. PLAN: initiate active surveillance.  12/21/18--CT c/a/p--IMPRESSION:  1. Stable tiny 2 to 3 mm lung nodules as described above. This  suggests a benign etiology.  2. Two low density liver  lesions. These appear to vary in appearance  depending on slight variations in timing of the bolus of contrast.  Likely no significant change. Recommend continued close surveillance.  3. No evidence of recurrent mass in the abdomen or pelvis. No  adenopathy.  12/28/18--scheduled oncology follow-up, Dr Monk. PATIENT NO-SHOW.  12/31/18--oncology follow-up, Dr Monk. Plan: Continue surveillance.  Increase Creon dose due to fatty food intolerance/weight loss.    3/29/19--CT c/a/p-- IMPRESSION: Stable scan compared to 12/21/2018 with the following  findings:  1. Several bilateral small pulmonary nodules, unchanged in appearance.  2. Two right hepatic small hypodense lesions, possibly hemangiomas.  These are also stable in appearance.  3. No apparent recurrent mass.  4/5/19 - - oncology follow-up, Dr. Monk.     7/1/19--CT c/a/p--                                                                IMPRESSION:  Stable examination with no convincing metastatic or  recurrent neoplasm.  7/8/19--oncology follow-up, Dr. Monk.    9/30/19 - - CT chest/abdomen/pelvis - - IMPRESSION:  1.  There is a small grouped area of nodularity in the right middle  lobe. Grouped nature favors an inflammatory etiology.  2.  The abdomen CT is stable with no convincing metastatic or  recurrent neoplasm.  10/7/19 - - oncology follow-up, Dr. Monk.    12/13/19 - - CT chest/abdomen/pelvis - - IMPRESSION:  1.  Stable abdomen/pelvis CT with no convincing metastatic or  recurrent neoplasm.  2. New/additional small focus of grouped nodularity in the right  middle lobe. Inflammatory etiology is still favored.  12/20/19 - - oncology follow-up, Dr. Monk.          Interim History:  Ms. Bangura returns to the clinic today with her .  She is more than a year and a half out from her distal pancreatectomy, and also she is approximately 15 months out from completion of adjuvant chemotherapy.  She is feeling well.  She has occasional abdominal cramping exacerbated by  food; for example, red meat, which she takes in rarely.  This has not changed since the last visit with me over the last 6 months.  She has been using Creon.  At some point in the last year, I had advised taking more Creon, and she has been taking 4 capsules with meals and 2 with snacks.  She feels this does help somewhat moderately, but not completely for the issue.  She does not have any cramping when she has bowel movements and is denying any melena or hematochezia.  She has a separate non-cancer-related issue with an ulcer on the base of her tongue, for which she has been seeing our Otolaryngology team.  She had a followup surveillance CT chest, abdomen and pelvis due to her history of pancreatic cancer.  This was done on , and this showed no recurrent malignancy, and there is an area of inflammation in the right middle lobe.  Her  is unremarkable as well.  She is not having any pain at this time.            Review Of Systems:  Comprehensive 14-point ROS reviewed. Pertinent symptoms reviewed above per HPI.    PAST MEDICAL HISTORY:   Pre-diabetes  Pancreatitis as above     PAST SURGICAL HISTORY:  Laparotomy x2 for ruptured tubal pregnancies    Discectomy for herniated lumbar disk  Breast reduction surgery     FAMILY HISTORY:  Colon cancer in maternal aunt  Paternal aunt and cousins with breast cancer  CAD in father and brother     SH: , from [place], with 30+ yr-old son. Works as . former smoker, quit 30 years ago. two glasses of wine per night, none since pancreatitis diagnosis     Allergies: none      Current Outpatient Medications:      acetaminophen (TYLENOL) 500 MG tablet, Take 2 tablets (1,000 mg) by mouth every 8 hours, Disp: 100 tablet, Rfl: 1     amylase-lipase-protease (CREON) 08612-58628 units CPEP per EC capsule, Take 2-3 with meals / 1-2 with snacks, up to 15 per day., Disp: 1350 capsule, Rfl: 3     blood glucose (ONETOUCH VERIO IQ) test strip, Use to test  "blood sugar 4 times daily or as directed., Disp: 400 each, Rfl: 3     clobetasol (TEMOVATE) 0.05 % GEL topical gel, Apply topically 2 times daily, Disp: 15 g, Rfl: 1     metFORMIN (GLUCOPHAGE-XR) 500 MG 24 hr tablet, Take 1 tablet 3 times a day with meals, Disp: 270 tablet, Rfl: 3     OneTouch Delica Lancets 33G MISC, 4 lancets daily, Disp: 150 each, Rfl: 3  No current facility-administered medications for this visit.     Facility-Administered Medications Ordered in Other Visits:      heparin 100 UNIT/ML injection 5 mL, 5 mL, Intracatheter, Once, Art Guevara MD      Physical Exam:  BP 90/64   Pulse 64   Temp 97.7  F (36.5  C) (Oral)   Ht 1.676 m (5' 6\")   Wt 61.5 kg (135 lb 8 oz)   SpO2 99%   BMI 21.87 kg/m       KPS 90  GENERAL:  Well-appearing, older  woman in no acute distress, alert and oriented x3.   HEENT:  Anicteric sclerae.  Oropharynx without mucositis or thrush.  No jaundice of the frenulum.     CARDIOVASCULAR:  Regular rate and rhythm, normal S1 and S2, no murmurs, gallops or rubs.   LUNGS:  Clear to auscultation throughout.   GI/ABDOMEN:  Soft, with no tenderness. The midline surgical scar is well healed.  No fluctuance, erythema or tenderness.  No palpable masses, splenomegaly or ascites.   EXTREMITIES:  No evidence of lower extremity edema.       Laboratory/Imaging Studies  Results for EZEQUIEL BANGURA (MRN [id number]) as of 12/19/2019 08:56   Ref. Range 3/29/2019 09:04 7/1/2019 08:45 9/30/2019 09:25 12/13/2019 08:48   Cancer Antigen 19-9 Latest Ref Range: 0 - 37 U/mL 2 2 3 2       Prior to the visit, I reviewed the CT chest, abdomen and pelvis personally.  I reviewed the examination and the images.  I printed out a copy of the full report.  I reviewed it in full with the patient and her  and gave them a printed copy of the laboratory values as well as the CT scan today.       ASSESSMENT/PLAN:  Mrs. Bangura is a 59 year old woman with resected pancreatic tail " adenocarcinoma.  She had initial extensive and severe necrotizing pancreatitis upon initial diagnosis and hospitalization that delayed treatment.  We ultimately were able to begin neoadjuvant FOLFIRINOX for 4 cycles beginning in January.  By the time she had pancreatectomy by Dr. Byrd in mid April there was no evidence of residual carcinoma seen on operative pathology.  She completed subsequently 4 months of gemcitabine and Xeloda in the adjuvant setting.       She is more than a year and a half out from her distal pancreatectomy.  She is 15 months out from completion of adjuvant chemotherapy.  She is doing well without recurrent disease.  I congratulated her once again.  I reminded her that she had an extremely rare, exceptional response to chemotherapy to the point where there was no residual carcinoma seen on the tumor explant at the time of the distal pancreatectomy in 04/2018.  For that reason, I offered a 3-4 month surveillance followup.  She opted for after 04/01, as it is when she will have Medicare benefits.  I think that is totally fine, and in that parameter, I have ordered a CT chest, abdomen and pelvis, , CBC and CMP to be performed 1 week prior to seeing me, and then she will see me to review the results at that time and let us know in the interim should she have any questions or concerns.  I offered a referral to our Gastroenterology team should she have any concerns or want formal consultation for indigestion and dyspepsia issues, and I also offered her followup with our Nutrition staff as well.     I spent 30 minutes in consultation, including H&P and Discussion. >50% of time was spent in counseling and in coordination of care.    Jovany Monk MD, PhD

## 2019-12-20 NOTE — NURSING NOTE
"Oncology Rooming Note    December 20, 2019 7:09 AM   Kitty Bangura is a 59 year old female who presents for:    Chief Complaint   Patient presents with     Oncology Clinic Visit     [facility] RETURN; PANCREATIC CANCER     Initial Vitals: BP 90/64   Pulse 64   Temp 97.7  F (36.5  C) (Oral)   Ht 1.676 m (5' 6\")   Wt 61.5 kg (135 lb 8 oz)   SpO2 99%   BMI 21.87 kg/m   Estimated body mass index is 21.87 kg/m  as calculated from the following:    Height as of this encounter: 1.676 m (5' 6\").    Weight as of this encounter: 61.5 kg (135 lb 8 oz). Body surface area is 1.69 meters squared.  Moderate Pain (4) Comment: Data Unavailable   No LMP recorded. Patient is postmenopausal.  Allergies reviewed: Yes  Medications reviewed: Yes    Medications: Medication refills not needed today.  Pharmacy name entered into [employer]:    [id number] - [place], [place] - [address] [facility] - [place], [place] - [address] [id number] - [place], [place] - [address] [facility] - [place], [place] - [address]    Clinical concerns: No additional concerns.  Dr. Monk was notified.      Roxana Wilson [facility]              "

## 2019-12-27 ENCOUNTER — MYC REFILL (OUTPATIENT)
Dept: OTOLARYNGOLOGY | Facility: CLINIC | Age: 59
End: 2019-12-27

## 2019-12-27 DIAGNOSIS — K12.30 STOMATITIS AND MUCOSITIS: ICD-10-CM

## 2019-12-27 DIAGNOSIS — K12.1 STOMATITIS AND MUCOSITIS: ICD-10-CM

## 2019-12-27 RX ORDER — CLOBETASOL PROPIONATE 0.05 MG/G
GEL TOPICAL 2 TIMES DAILY
Qty: 15 G | Refills: 1 | Status: SHIPPED | OUTPATIENT
Start: 2019-12-27 | End: 2020-04-07

## 2020-04-01 ENCOUNTER — TELEPHONE (OUTPATIENT)
Dept: OTOLARYNGOLOGY | Facility: CLINIC | Age: 60
End: 2020-04-01

## 2020-04-01 NOTE — TELEPHONE ENCOUNTER
Pt called regarding upcoming appointment with Dr. Ward. Explained that in-light of everything occurring with COVID-19 we are limiting clinic visits to acutely urgent visits only.     Pt rescheduled to virtual visit 4/7 at 0900.     Direct line given for additional questions/concerns.     Natice Schwab, RN BSN

## 2020-04-03 ENCOUNTER — TELEPHONE (OUTPATIENT)
Dept: OTOLARYNGOLOGY | Facility: CLINIC | Age: 60
End: 2020-04-03

## 2020-04-03 NOTE — TELEPHONE ENCOUNTER
Spoke with patient regarding virtual visit with Dr. Ward on 4/7/20. Confirmed patient's email address. Also verified patient's appointment time, and informed her that we would be calling again 10-15 minutes prior to the appointment. Patient expressed understanding. Will send [employer] message with virtual visit information.    Patient email: [email or web address]  Appointment date: 4/7/20  Appointment time: 0900  [employer] active? YES

## 2020-04-07 ENCOUNTER — VIRTUAL VISIT (OUTPATIENT)
Dept: OTOLARYNGOLOGY | Facility: CLINIC | Age: 60
End: 2020-04-07
Payer: COMMERCIAL

## 2020-04-07 VITALS — HEIGHT: 65 IN | WEIGHT: 134 LBS | BODY MASS INDEX: 22.33 KG/M2

## 2020-04-07 DIAGNOSIS — K12.1 STOMATITIS AND MUCOSITIS: ICD-10-CM

## 2020-04-07 DIAGNOSIS — K12.30 STOMATITIS AND MUCOSITIS: ICD-10-CM

## 2020-04-07 RX ORDER — CLOBETASOL PROPIONATE 0.05 MG/G
GEL TOPICAL 2 TIMES DAILY
Qty: 15 G | Refills: 1 | Status: SHIPPED | OUTPATIENT
Start: 2020-04-07 | End: 2020-07-14

## 2020-04-07 RX ORDER — NYSTATIN 100000/ML
SUSPENSION, ORAL (FINAL DOSE FORM) ORAL
Qty: 400 ML | Refills: 1 | Status: SHIPPED | OUTPATIENT
Start: 2020-04-07 | End: 2020-04-20

## 2020-04-07 ASSESSMENT — PAIN SCALES - GENERAL: PAINLEVEL: NO PAIN (0)

## 2020-04-07 ASSESSMENT — MIFFLIN-ST. JEOR: SCORE: 1173.7

## 2020-04-07 NOTE — PATIENT INSTRUCTIONS
1. You were seen in the ENT Clinic today by Dr. Ward.  If you have any questions or concerns after your appointment, please call   - Option 1: ENT Clinic: [phone]  - Option 2: Ernie (Dr. Ward's Nurse): [phone]    2.   Plan to return to clinic for a telephone visit in 3 weeks; 5/5 at 0915    3. Nystatin swish and swallow sent to local pharmacy; use 1 tsp twice daily. Clobetasol gel also refilled; apply twice daily    Natice Schwab, RN  [facility]- Otolaryngology  [phone]

## 2020-04-07 NOTE — PROGRESS NOTES
"Kitty Bangura is a 59 year old female who is being evaluated via a billable video visit.      The patient has been notified of following:     \"This video visit will be conducted via a call between you and your physician/provider. We have found that certain health care needs can be provided without the need for an in-person physical exam.  This service lets us provide the care you need with a video conversation.  If a prescription is necessary we can send it directly to your pharmacy.  If lab work is needed we can place an order for that and you can then stop by our lab to have the test done at a later time.    If during the course of the call the physician/provider feels a video visit is not appropriate, you will not be charged for this service.\"     Patient has given verbal consent for Video visit? Yes    Patient would like the video invitation sent by: Send to e-mail at: [email or web address]    Video Start Time: 9:27    Kitty Bangura complains of    Chief Complaint   Patient presents with     Follow Up       I have reviewed and updated the patient's Past Medical History, Social History, Family History and Medication List.    ALLERGIES  Patient has no known allergies.    Additional provider notes:      HISTORY OF PRESENT ILLNESS: Kitty Bangura is a 59 year old female with a history of stomatitis who has had waxing and waning course with wome intermittent severity. Has done well with near resolution with gentian violet and clobetasol and continues to use this some  Improvement. No additional or new complaints no other exacerbating or alleviating features.       Last 2 Scores for Patient-Answered VHI Questionnaire  No flowsheet data found.    Last 2 Scores for Patient-Answered CSI Questionnaire  No flowsheet data found.      Last 2 Scores for Patient-Answered EAT Questionnaire  No flowsheet data found.        PAST MEDICAL HISTORY:   Past Medical History:   Diagnosis Date     Arthritis Post chemo    " Suspected     Diabetes (H)      Necrotizing pancreatitis      Pancreatic cancer (H)      Pneumonia     2016     PONV (postoperative nausea and vomiting)        PAST SURGICAL HISTORY:   Past Surgical History:   Procedure Laterality Date     ABDOMEN SURGERY  1983    Ruptured tubal pregnancies, additional surgeries followed     BACK SURGERY  2004    L5, S1 discectomy     BREAST SURGERY  2003    Breast reduction     COLONOSCOPY  2008     COLONOSCOPY N/A 12/11/2018    Procedure: colonoscopy;  Surgeon: Lobito Marte MD;  Location: [facility]     ENDOSCOPIC RETROGRADE CHOLANGIOPANCREATOGRAM N/A 1/25/2018    Procedure: COMBINED ENDOSCOPIC RETROGRADE CHOLANGIOPANCREATOGRAPHY, PLACE TUBE/STENT;  Endoscopic retrograde cholangiopancreatogram with stent placement and cyst drainage bile ;  Surgeon: Vito Sanches MD;  Location: [facility]     ENDOSCOPIC ULTRASOUND LOWER GASTROINTESTIONAL TRACT (GI) N/A 1/25/2018    Procedure: ENDOSCOPIC ULTRASOUND LOWER GASTROINTESTIONAL TRACT (GI);  Endoscopic Ultrasound with guided Cyst-Gastrostomy;  Surgeon: González Metz MD;  Location: [facility]     ENDOSCOPIC ULTRASOUND, ESOPHAGOSCOPY, GASTROSCOPY, DUODENOSCOPY (EGD), NECROSECTOMY N/A 12/4/2017    Procedure: ENDOSCOPIC ULTRASOUND, ESOPHAGOSCOPY, GASTROSCOPY, DUODENOSCOPY (EGD), NECROSECTOMY;  Esophagogastroduodenoscopy, with  Necrosectomy and stents replacement  ;  Surgeon: González Metz MD;  Location: [facility]     ENDOSCOPIC ULTRASOUND, ESOPHAGOSCOPY, GASTROSCOPY, DUODENOSCOPY (EGD), NECROSECTOMY N/A 12/12/2017    Procedure: ENDOSCOPIC ULTRASOUND, ESOPHAGOSCOPY, GASTROSCOPY, DUODENOSCOPY (EGD), NECROSECTOMY;  Esophagogastroduodenoscopy with Necrosectomy, Balloon Dilation, stent removal X3 and Cystgastrostomy stent Placement X2;  Surgeon: Guru Cecilia Staples MD;  Location: [facility]     ESOPHAGOSCOPY, GASTROSCOPY, DUODENOSCOPY (EGD), COMBINED N/A 11/29/2017    Procedure: COMBINED ENDOSCOPIC ULTRASOUND, ESOPHAGOSCOPY,  GASTROSCOPY, DUODENOSCOPY (EGD), FINE NEEDLE ASPIRATE/BIOPSY;  Endoscopic Ultrasound with cystgastrostomy and fine needle aspiration ;  Surgeon: González Metz MD;  [facility]     ESOPHAGOSCOPY, GASTROSCOPY, DUODENOSCOPY (EGD), COMBINED N/A 2018    Procedure: COMBINED ESOPHAGOSCOPY, GASTROSCOPY, DUODENOSCOPY (EGD);  EGD;  Surgeon: González Metz MD;  Location: [facility]     ESOPHAGOSCOPY, GASTROSCOPY, DUODENOSCOPY (EGD), COMBINED N/A 2018    Procedure: COMBINED ESOPHAGOSCOPY, GASTROSCOPY, DUODENOSCOPY (EGD), REMOVE FOREIGN BODY;;  Surgeon: González Metz MD;  Location:  GI     GYN SURGERY  1983    Multiple ectopic pregnancies, reconnect,  1988     INSERT PORT VASCULAR ACCESS Right 2018    Procedure: INSERT PORT VASCULAR ACCESS;  Chest Port Placement - right;  Surgeon: Chanda Lawson PA-C;  [facility]     IR PORT REMOVAL RIGHT  2019     LAPAROSCOPY DIAGNOSTIC (GENERAL) N/A 2018    Procedure: LAPAROSCOPY DIAGNOSTIC (GENERAL);  Diagnostic Laparoscopy; Open Distal Pancreatectomy And Splenectomy, splenic flexure mobilization, cholecystectomy, intraoperative ultrasound;  Surgeon: Ja Byrd MD;  [facility]     PANCREATECTOMY, SPLENECTOMY N/A 2018    Procedure: PANCREATECTOMY, SPLENECTOMY;;  Surgeon: Ja Byrd MD;  [facility]     REMOVE PORT VASCULAR ACCESS Right 2019    Procedure: Right Port Removal;  Surgeon: Juan J Donaldson PA-C;  [facility]       FAMILY HISTORY:   Family History   Problem Relation Age of Onset     Heart Disease Father      Hyperlipidemia Father      Heart Disease Brother      Diabetes Mother      Hypertension Mother      Obesity Mother      Depression Mother      Diabetes Maternal Grandfather      Hypertension Maternal Grandfather      Obesity Maternal Grandfather      Diabetes Sister      Hypertension Sister      Depression Sister      Anxiety Disorder Sister      Obesity Sister      Hypertension  Brother      Hyperlipidemia Brother      Heart Disease Brother      Breast Cancer Cousin      Breast Cancer Cousin      Breast Cancer Cousin      Colon Cancer Other      Other Cancer Other         Mesothelioma     Depression Sister      Anxiety Disorder Brother      Anxiety Disorder Son      Depression Son      Mental Illness Sister         Schizophrenia     Hypertension Sister      Obesity Maternal Grandmother      Obesity Sister      Diabetes Nephew      Hypertension Brother        SOCIAL HISTORY:   Social History     Tobacco Use     Smoking status: Former Smoker     Packs/day: 0.50     Years: 5.00     Pack years: 2.50     Types: Cigarettes     Start date: 1974     Last attempt to quit: 1981     Years since quittin.2     Smokeless tobacco: Never Used   Substance Use Topics     Alcohol use: No     Alcohol/week: 21.0 standard drinks     Comment: Now quit since Oct 31.  Moderate drinker in past       REVIEW OF SYSTEMS: Ten point review of systems was performed and is negative except for:   [facility] ROS 2019   Constitutional -   Neurology Dizzy spells   Ears, Nose, Throat Ringing/noise in ears, Nasal congestion or drainage, Hoarseness   Cardiopulmonary Cough   Gastrointestinal/Genitourinary -   Musculoskeletal Sore or stiff joints   Hematologic -   Endocrine -   Other Problems with anesthesia in surgery        ALLERGIES: Patient has no known allergies.    MEDICATIONS:   Current Outpatient Medications   Medication Sig Dispense Refill     acetaminophen (TYLENOL) 500 MG tablet Take 2 tablets (1,000 mg) by mouth every 8 hours 100 tablet 1     amylase-lipase-protease (CREON) 23550-68549 units CPEP per EC capsule Take 2-3 with meals / 1-2 with snacks, up to 15 per day. 1350 capsule 3     blood glucose (ONETOUCH VERIO IQ) test strip Use to test blood sugar 4 times daily or as directed. 400 each 3     clobetasol (TEMOVATE) 0.05 % GEL topical gel Apply topically 2 times daily 15 g 1     metFORMIN (GLUCOPHAGE-XR)  500 MG 24 hr tablet Take 1 tablet 3 times a day with meals 270 tablet 3     nystatin (MYCOSTATIN) 968898 UNIT/ML suspension Take 1 teaspoonful by mouth 2 times daily 400 mL 1     OneTouch Delica Lancets 33G MISC 4 lancets daily 150 each 3         PHYSICAL EXAMINATION:  She  is awake, alert and in no apparent distress.    No difficult breathing. Pictures of the tongue reveal some erythema posterolaterally on the left side primarily. Upper airway is clear.  Cranial nerves II-XII are grossly intact.          IMPRESSION/PLAN: Would start nystatin and continue to use clobetasol the way we have been doing. Will check again in 2-3 weeks.     Enrique Ward MD                      Video-Visit Details    Type of service:  Video Visit    Video End Time (time video stopped): 9:45    Originating Location (pt. Location): her home   Distant Location (provider location):  [facility]     Mode of Communication:  Video Conference via [facility]     Enrique Ward MD

## 2020-04-13 ENCOUNTER — HOSPITAL ENCOUNTER (OUTPATIENT)
Dept: CT IMAGING | Facility: CLINIC | Age: 60
End: 2020-04-13
Attending: INTERNAL MEDICINE
Payer: COMMERCIAL

## 2020-04-13 ENCOUNTER — HOSPITAL ENCOUNTER (OUTPATIENT)
Dept: LAB | Facility: CLINIC | Age: 60
End: 2020-04-13
Attending: INTERNAL MEDICINE
Payer: COMMERCIAL

## 2020-04-13 DIAGNOSIS — C25.9 MALIGNANT NEOPLASM OF PANCREAS, UNSPECIFIED LOCATION OF MALIGNANCY (H): ICD-10-CM

## 2020-04-13 LAB
ALBUMIN SERPL-MCNC: 4.2 G/DL (ref 3.4–5)
ALP SERPL-CCNC: 89 U/L (ref 40–150)
ALT SERPL W P-5'-P-CCNC: 25 U/L (ref 0–50)
ANION GAP SERPL CALCULATED.3IONS-SCNC: 5 MMOL/L (ref 3–14)
AST SERPL W P-5'-P-CCNC: 20 U/L (ref 0–45)
BASOPHILS # BLD AUTO: 0.1 10E9/L (ref 0–0.2)
BASOPHILS NFR BLD AUTO: 0.9 %
BILIRUB SERPL-MCNC: 0.6 MG/DL (ref 0.2–1.3)
BUN SERPL-MCNC: 16 MG/DL (ref 7–30)
CALCIUM SERPL-MCNC: 9.5 MG/DL (ref 8.5–10.1)
CHLORIDE SERPL-SCNC: 105 MMOL/L (ref 94–109)
CO2 SERPL-SCNC: 29 MMOL/L (ref 20–32)
CREAT SERPL-MCNC: 0.62 MG/DL (ref 0.52–1.04)
DIFFERENTIAL METHOD BLD: NORMAL
EOSINOPHIL # BLD AUTO: 0.1 10E9/L (ref 0–0.7)
EOSINOPHIL NFR BLD AUTO: 1.2 %
ERYTHROCYTE [DISTWIDTH] IN BLOOD BY AUTOMATED COUNT: 13.1 % (ref 10–15)
GFR SERPL CREATININE-BSD FRML MDRD: >90 ML/MIN/{1.73_M2}
GLUCOSE SERPL-MCNC: 140 MG/DL (ref 70–99)
HCT VFR BLD AUTO: 41 % (ref 35–47)
HGB BLD-MCNC: 13.7 G/DL (ref 11.7–15.7)
IMM GRANULOCYTES # BLD: 0 10E9/L (ref 0–0.4)
IMM GRANULOCYTES NFR BLD: 0.1 %
LYMPHOCYTES # BLD AUTO: 4.3 10E9/L (ref 0.8–5.3)
LYMPHOCYTES NFR BLD AUTO: 47.8 %
MCH RBC QN AUTO: 31.8 PG (ref 26.5–33)
MCHC RBC AUTO-ENTMCNC: 33.4 G/DL (ref 31.5–36.5)
MCV RBC AUTO: 95 FL (ref 78–100)
MONOCYTES # BLD AUTO: 1 10E9/L (ref 0–1.3)
MONOCYTES NFR BLD AUTO: 10.7 %
NEUTROPHILS # BLD AUTO: 3.5 10E9/L (ref 1.6–8.3)
NEUTROPHILS NFR BLD AUTO: 39.3 %
NRBC # BLD AUTO: 0 10*3/UL
NRBC BLD AUTO-RTO: 0 /100
PLATELET # BLD AUTO: 389 10E9/L (ref 150–450)
POTASSIUM SERPL-SCNC: 4.2 MMOL/L (ref 3.4–5.3)
PROT SERPL-MCNC: 8.1 G/DL (ref 6.8–8.8)
RBC # BLD AUTO: 4.31 10E12/L (ref 3.8–5.2)
SODIUM SERPL-SCNC: 139 MMOL/L (ref 133–144)
WBC # BLD AUTO: 8.9 10E9/L (ref 4–11)

## 2020-04-13 PROCEDURE — 36415 COLL VENOUS BLD VENIPUNCTURE: CPT | Performed by: INTERNAL MEDICINE

## 2020-04-13 PROCEDURE — 25000125 ZZHC RX 250: Performed by: RADIOLOGY

## 2020-04-13 PROCEDURE — 80053 COMPREHEN METABOLIC PANEL: CPT | Performed by: INTERNAL MEDICINE

## 2020-04-13 PROCEDURE — 25000128 H RX IP 250 OP 636: Performed by: RADIOLOGY

## 2020-04-13 PROCEDURE — 85025 COMPLETE CBC W/AUTO DIFF WBC: CPT | Performed by: INTERNAL MEDICINE

## 2020-04-13 PROCEDURE — 86301 IMMUNOASSAY TUMOR CA 19-9: CPT | Performed by: INTERNAL MEDICINE

## 2020-04-13 PROCEDURE — 74177 CT ABD & PELVIS W/CONTRAST: CPT

## 2020-04-13 RX ORDER — IOPAMIDOL 755 MG/ML
66 INJECTION, SOLUTION INTRAVASCULAR ONCE
Status: COMPLETED | OUTPATIENT
Start: 2020-04-13 | End: 2020-04-13

## 2020-04-13 RX ADMIN — SODIUM CHLORIDE 56 ML: 9 INJECTION, SOLUTION INTRAVENOUS at 10:16

## 2020-04-13 RX ADMIN — IOPAMIDOL 66 ML: 755 INJECTION, SOLUTION INTRAVENOUS at 10:15

## 2020-04-14 LAB — CANCER AG19-9 SERPL-ACNC: 3 U/ML (ref 0–37)

## 2020-05-05 ENCOUNTER — VIRTUAL VISIT (OUTPATIENT)
Dept: OTOLARYNGOLOGY | Facility: CLINIC | Age: 60
End: 2020-05-05
Payer: COMMERCIAL

## 2020-05-05 VITALS — HEIGHT: 66 IN | BODY MASS INDEX: 21.38 KG/M2 | WEIGHT: 133 LBS

## 2020-05-05 DIAGNOSIS — K12.1 STOMATITIS AND MUCOSITIS: Primary | ICD-10-CM

## 2020-05-05 DIAGNOSIS — K12.30 STOMATITIS AND MUCOSITIS: Primary | ICD-10-CM

## 2020-05-05 ASSESSMENT — PAIN SCALES - GENERAL: PAINLEVEL: NO PAIN (0)

## 2020-05-05 ASSESSMENT — MIFFLIN-ST. JEOR: SCORE: 1184.78

## 2020-05-05 NOTE — PATIENT INSTRUCTIONS
1. You were seen in the ENT Clinic today by Dr. Ward.  If you have any questions or concerns after your appointment, please call   - Option 1: ENT Clinic: [phone]  - Option 2: Ernie (Dr. Ward's Nurse): [phone]    2.   Plan to return to clinic 10 weeks     Natice Schwab, RN  [facility]  [phone]

## 2020-05-05 NOTE — PROGRESS NOTES
"Kitty is here for followup today.  This is a phone call visit with her today.  She has had problems with her oral cavity and she has been getting somewhat better about using clobetasol and ointment as well as nystatin.  She has not gotten full relief from this, but she is having no progression of any of her symptoms.      We spoke for approximately 10 minutes today about this today, reassuring her that it was not cancer or similar, but we also felt that we should probably go ahead and have her follow up physically in clinic in the next 10 weeks or so.  We talked to her for about 10 minutes in total about this.  I will see her back in clinic again in approximately 10 weeks or so.     Kitty Bangura is a 59 year old female who is being evaluated via a billable telephone visit.      The patient has been notified of following:     \"This telephone visit will be conducted via a call between you and your physician/provider. We have found that certain health care needs can be provided without the need for a physical exam.  This service lets us provide the care you need with a short phone conversation.  If a prescription is necessary we can send it directly to your pharmacy.  If lab work is needed we can place an order for that and you can then stop by our lab to have the test done at a later time.    Telephone visits are billed at different rates depending on your insurance coverage. During this emergency period, for some insurers they may be billed the same as an in-person visit.  Please reach out to your insurance provider with any questions.    If during the course of the call the physician/provider feels a telephone visit is not appropriate, you will not be charged for this service.\"    Patient has given verbal consent for Telephone visit? yes  What phone number would you like to be contacted at?   [phone]  How would you like to obtain your AVS? Mail a copy    Phone call duration: 10 minutes        Enrique Jerez" MD [place]

## 2020-05-13 ENCOUNTER — HOSPITAL ENCOUNTER (OUTPATIENT)
Dept: MAMMOGRAPHY | Facility: CLINIC | Age: 60
Discharge: HOME | End: 2020-05-13
Attending: FAMILY MEDICINE | Admitting: FAMILY MEDICINE
Payer: COMMERCIAL

## 2020-05-13 DIAGNOSIS — Z12.31 VISIT FOR SCREENING MAMMOGRAM: ICD-10-CM

## 2020-05-13 PROCEDURE — 77063 BREAST TOMOSYNTHESIS BI: CPT

## 2020-07-14 ENCOUNTER — OFFICE VISIT (OUTPATIENT)
Dept: OTOLARYNGOLOGY | Facility: CLINIC | Age: 60
End: 2020-07-14
Payer: COMMERCIAL

## 2020-07-14 VITALS
HEART RATE: 73 BPM | HEIGHT: 66 IN | RESPIRATION RATE: 18 BRPM | SYSTOLIC BLOOD PRESSURE: 118 MMHG | BODY MASS INDEX: 21.38 KG/M2 | DIASTOLIC BLOOD PRESSURE: 82 MMHG | WEIGHT: 133 LBS

## 2020-07-14 DIAGNOSIS — K12.1 STOMATITIS AND MUCOSITIS: ICD-10-CM

## 2020-07-14 DIAGNOSIS — K12.30 STOMATITIS AND MUCOSITIS: ICD-10-CM

## 2020-07-14 RX ORDER — CLOBETASOL PROPIONATE 0.05 MG/G
GEL TOPICAL 2 TIMES DAILY
Qty: 15 G | Refills: 1 | Status: SHIPPED | OUTPATIENT
Start: 2020-07-14 | End: 2020-11-26

## 2020-07-14 ASSESSMENT — MIFFLIN-ST. JEOR: SCORE: 1185.03

## 2020-07-14 ASSESSMENT — PAIN SCALES - GENERAL: PAINLEVEL: NO PAIN (0)

## 2020-07-14 NOTE — PATIENT INSTRUCTIONS
1. Decrease clobetasol by 50% (about once daily). Refill has been provided.   2. Follow up in 6 months with Dr. Ward for mouth recheck.  3. Please call the ENT clinic at [phone] with any questions or concerns.

## 2020-07-14 NOTE — NURSING NOTE
"Chief Complaint   Patient presents with     RECHECK     follow up      Blood pressure 118/82, pulse 73, resp. rate 18, height 1.676 m (5' 6\"), weight 60.3 kg (133 lb), not currently breastfeeding.    Vito Mendez LPN    "

## 2020-07-14 NOTE — PROGRESS NOTES
"  Otolaryngology Clinic      Name: Kitty Bangura  MRN: [id number]  Age: 58 year old  : 2020      Chief Complaint:   RECHECK     History of Present Illness: Kitty Bangura is a 58 year old female with a history of a left-tongue ulcer who presents for follow up. At the time of the patient's last evaluation she had a history and exam consistent with oral lichen planus and she was prescribed clobetasol BID. Here the patient states that her mouth improved after the clobetasol treatment but then returns as soon as she stops this treatment. The initiation of these symptoms were not associated with any medication changes. The patient has been using baking soda and salt rinses as well as biotin. Review of Systems:   Pertinent items are noted in HPI or as in patient entered ROS below, remainder of complete ROS is negative.     Physical Exam:   /82   Pulse 73   Resp 18   Ht 1.676 m (5' 6\")   Wt 60.3 kg (133 lb)   BMI 21.47 kg/m       PHYSICAL EXAMINATION:    Constitutional:  The patient was unaccompanied, well-groomed, and in no acute distress.    Skin:  Warm and pink.    Neurologic:  Alert and oriented x 3.  CN's III-XII within normal limits.  Voice normal.   Psychiatric:  The patient's affect was calm, cooperative, and appropriate.    Respiratory:  Breathing comfortably without stridor or exertion of accessory muscles.    Eyes: Extraocular movement intact.    Head:  Normocephalic and atraumatic.  No lesions or scars.    Ears:  Pinnae and tragus non-tender.    OC/OP: Lichenoid changes to the left ventral side tongue, less than last time, about 7 mm ulceration present. No abnormal lymph tissue in the oropharynx.  The pterygoid region is non-tender.    Neck:  Supple with normal laryngeal and tracheal landmarks.  The parotid beds were without masses.  No palpable thyroid.  Lymphatic:  There is no palpable lymphadenopathy in the neck.     Assessment and Plan:  58 year old female with a history " of a left-tongue ulcer and what I suspect is lichens planus. She has had persistence of her symptoms but has had slight improvement on exam today. I applied gentian violet to the area and prescribed her to use this at home a couple times a week as needed. Also use clobetasol up to 2-3 times a week as needed. Also provided dietary precautions. No indication for biopsy, but do want her to come back in 4 months. Return sooner if she develops a lesion.     Enrique Ward MD

## 2020-07-14 NOTE — LETTER
"2020       RE: Kitty Bangura  [address] [facility] [address]     Dear Colleague,    Thank you for referring your patient, Kitty Bangura, to the [facility]AR NOSE AND THROAT at [facility]. Please see a copy of my visit note below.      Otolaryngology Clinic      Name: Kitty Bangura  MRN: [id number]  Age: 58 year old  : 2022      Chief Complaint:   RECHECK     History of Present Illness: Kitty Bangura is a 58 year old female with a history of a left-tongue ulcer who presents for follow up. At the time of the patient's last evaluation she had a history and exam consistent with oral lichen planus and she was prescribed clobetasol BID. Here the patient states that her mouth improved after the clobetasol treatment but then returns as soon as she stops this treatment. The initiation of these symptoms were not associated with any medication changes. The patient has been using baking soda and salt rinses as well as biotin. Review of Systems:   Pertinent items are noted in HPI or as in patient entered ROS below, remainder of complete ROS is negative.     Physical Exam:   /82   Pulse 73   Resp 18   Ht 1.676 m (5' 6\")   Wt 60.3 kg (133 lb)   BMI 21.47 kg/m       PHYSICAL EXAMINATION:    Constitutional:  The patient was unaccompanied, well-groomed, and in no acute distress.    Skin:  Warm and pink.    Neurologic:  Alert and oriented x 3.  CN's III-XII within normal limits.  Voice normal.   Psychiatric:  The patient's affect was calm, cooperative, and appropriate.    Respiratory:  Breathing comfortably without stridor or exertion of accessory muscles.    Eyes: Extraocular movement intact.    Head:  Normocephalic and atraumatic.  No lesions or scars.    Ears:  Pinnae and tragus non-tender.    OC/OP: Lichenoid changes to the left ventral side tongue, less than last time, about 7 mm ulceration present. No abnormal lymph tissue in the " oropharynx.  The pterygoid region is non-tender.    Neck:  Supple with normal laryngeal and tracheal landmarks.  The parotid beds were without masses.  No palpable thyroid.  Lymphatic:  There is no palpable lymphadenopathy in the neck.     Assessment and Plan:  58 year old female with a history of a left-tongue ulcer and what I suspect is lichens planus. She has had persistence of her symptoms but has had slight improvement on exam today. I applied gentian violet to the area and prescribed her to use this at home a couple times a week as needed. Also use clobetasol up to 2-3 times a week as needed. Also provided dietary precautions. No indication for biopsy, but do want her to come back in 4 months. Return sooner if she develops a lesion.     Enrique Ward MD      Again, thank you for allowing me to participate in the care of your patient.      Sincerely,    Enrique Ward MD

## 2020-07-29 DIAGNOSIS — C25.9 MALIGNANT NEOPLASM OF PANCREAS, UNSPECIFIED LOCATION OF MALIGNANCY (H): Primary | ICD-10-CM

## 2020-08-03 ENCOUNTER — HOSPITAL ENCOUNTER (OUTPATIENT)
Dept: CT IMAGING | Facility: CLINIC | Age: 60
Discharge: HOME | End: 2020-08-03
Attending: INTERNAL MEDICINE | Admitting: INTERNAL MEDICINE
Payer: COMMERCIAL

## 2020-08-03 DIAGNOSIS — C25.9 MALIGNANT NEOPLASM OF PANCREAS, UNSPECIFIED LOCATION OF MALIGNANCY (H): ICD-10-CM

## 2020-08-03 LAB
CREAT BLD-MCNC: 0.6 MG/DL (ref 0.52–1.04)
GFR SERPL CREATININE-BSD FRML MDRD: >90 ML/MIN/{1.73_M2}

## 2020-08-03 PROCEDURE — 25000125 ZZHC RX 250: Performed by: INTERNAL MEDICINE

## 2020-08-03 PROCEDURE — 82565 ASSAY OF CREATININE: CPT

## 2020-08-03 PROCEDURE — 36415 COLL VENOUS BLD VENIPUNCTURE: CPT | Performed by: INTERNAL MEDICINE

## 2020-08-03 PROCEDURE — 74177 CT ABD & PELVIS W/CONTRAST: CPT

## 2020-08-03 PROCEDURE — 71260 CT THORAX DX C+: CPT

## 2020-08-03 PROCEDURE — 86301 IMMUNOASSAY TUMOR CA 19-9: CPT | Mod: 90 | Performed by: INTERNAL MEDICINE

## 2020-08-03 PROCEDURE — 99000 SPECIMEN HANDLING OFFICE-LAB: CPT | Performed by: INTERNAL MEDICINE

## 2020-08-03 PROCEDURE — 25000128 H RX IP 250 OP 636: Performed by: INTERNAL MEDICINE

## 2020-08-03 RX ORDER — IOPAMIDOL 755 MG/ML
65 INJECTION, SOLUTION INTRAVASCULAR ONCE
Status: COMPLETED | OUTPATIENT
Start: 2020-08-03 | End: 2020-08-03

## 2020-08-03 RX ADMIN — SODIUM CHLORIDE 56 ML: 9 INJECTION, SOLUTION INTRAVENOUS at 09:39

## 2020-08-03 RX ADMIN — IOPAMIDOL 65 ML: 755 INJECTION, SOLUTION INTRAVENOUS at 09:39

## 2020-08-04 LAB — CANCER AG19-9 SERPL-ACNC: 3 U/ML (ref 0–37)

## 2020-08-05 NOTE — PROGRESS NOTES
"Kitty Bangura is a 59 year old female who is being evaluated via a billable video visit.      The patient has been notified of following:     \"This video visit will be conducted via a call between you and your physician/provider. We have found that certain health care needs can be provided without the need for an in-person physical exam.  This service lets us provide the care you need with a video conversation.  If a prescription is necessary we can send it directly to your pharmacy.  If lab work is needed we can place an order for that and you can then stop by our lab to have the test done at a later time.    Video visits are billed at different rates depending on your insurance coverage.  Please reach out to your insurance provider with any questions.    If during the course of the call the physician/provider feels a video visit is not appropriate, you will not be charged for this service.\"    Patient has given verbal consent for Video visit? Yes    How would you like to obtain your AVS? MyChart     If you are dropped from the video visit, the video invite should be resent to: Send to e-mail at: [email or web address]     Will anyone else be joining your video visit?  No          Kitty Bangura is a 59 year old female who is being evaluated via a billable video visit.      The patient has been notified of following:     \"This video visit will be conducted via a call between you and your physician/provider. We have found that certain health care needs can be provided without the need for an in-person physical exam.  This service lets us provide the care you need with a video conversation.  If a prescription is necessary we can send it directly to your pharmacy.  If lab work is needed we can place an order for that and you can then stop by our lab to have the test done at a later time.    Video visits are billed at different rates depending on your insurance coverage.  Please reach out to your insurance " "provider with any questions.    If during the course of the call the physician/provider feels a video visit is not appropriate, you will not be charged for this service.\"    Patient has given verbal consent for Video visit? Yes    How would you like to obtain your AVS? MyChart     If you are dropped from the video visit, the video invite should be resent to: Send to e-mail at: [email or web address]     Will anyone else be joining your video visit? No         I have reviewed and updated the patient's allergies and medication list. Patient was asked to provide any patient recorded vital signs, height and/or weight.  Please see \"Patient Reported Vital Signs\" tab for that information.        Concerns: Patient has no new concerns.      Refills: None         KRYSTYNA Anne            Oncology Follow-up Visit:  Aug 10, 2020    Cancer diagnosis: cystic pancreatic tail adenocarcinoma  small subcentimeter pulmonary nodules seen on staging scans of unclear etiology.     Treatment to date: neoadjuvant FOLFIRINOX x4 cycles, 1/15/18-3/6/18  Difficulty tolerating neoadjuvant intent chemotherapy. Distal pancreatectomy performed April 17, 2018, no residual carcinoma seen on operative pathology.  Treated with four cycles of adjuvant chemotherapy with gemcitabine and Xeloda, completed September 2018.     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     Referring physician: Dr. González Metz MD    Oncology History: She was previously healthy until she presented in early November 2017 with abdominal pain. CT abdomen on 11/1/17 showed acute pancreatitis (lipase 41,960) and a 3cm heterogenous pancreatic tail mass. The etiology of pancreatitis is unclear - no obstructing gallstone and not a heavy drinker. She was hospitalized for one week and followed up with " Dr. González Metz in GI clinic.     11/29/17 -- endoscopic drainage of walled-off area of pancreatic necrosis by Dr. Metz. During the same procedure she had an EUS FNA of the pancreatic tail mass and pathology showed adenocarcinoma. The mass measured 33 x 27 mm, encapsulated with solid and cystic components, rim calcification, and no evidence of invasion.     12/9/17 -- Staging CT chest/abd/pelvis showed several small pulmonary nodules (largest 3mm), unchanged mass in the pancreatic tail, mildly prominent mesenteric nodes thought likely reactive, and small right hepatic lobe hypodensities. Abdominal MRI on 12/10/17 showed hepatic hemangiomas, no evidence of metastatic disease to the liver.     1/8/18 - - oncology consultation, Dr. Monk. Recommendation: neoadjuvant intent chemotherapy with FOLFIRINOX.    1/15/18 - - cycle 1/day one FOLFIRINOX chemotherapy.    1/20 through 1/27/18 - - hospitalization at the [facility], for acute pancreatitis. Following three days of nausea, vomiting, pain. During this hospitalization: ERCP was attempted by Dr. Sanches with pancreatic stent placement, but pancreatic duct was completely obstructed and wire was unable to pass beyond the duct. Dr. Metz performed successful US guided cyst gastrostomy January 25, 2018.    1/31/18 - - oncology follow-up, DANTE Talavera. Neutropenic with ANC 0.4, thus defer cycle two of chemotherapy.    2/5/18 - - oncology follow-up, DANTE Junior.  Cycle 2/day one FOLFIRINOX chemotherapy. Patient endorses cold sensitivity secondary to oxaliplatin. Neulasta given for growth factor support. Due to significant neutropenia with cycle one.    2/11/18 - - ER evaluation for epigastric pain. Discharged to home from ER. Leukocytosis secondary to Neulasta given on February 8.    2/20/18 - - oncology follow-up, DANTE Junior. Cycle 3/day one FOLFIRINOX given without Neulasta. At patient request, oxaliplatin dose reduced by 10%  due to neuropathy/cold sensitivity.    3/6/18 - - oncology follow-up, DANTE Talavera. Cycle 4/day one FOLFIRINOX chemotherapy.    3/13/18 - - CT chest/abdomen/pelvis - - IMPRESSION: 1. Minimal decrease in size and marked decrease in enhancement and pancreatic mass since the comparison study. 2. No significant change in low dense lesions in the liver since comparison.    3/16/18 - - oncology follow-up Dr. Monk. Plan is to hold on further chemotherapy as surgery is scheduled for 4/17/18 4/17/18 - - surgery: PROCEDURE: Diagnostic laparoscopy, splenic flexure mobilization, intraoperative ultrasound, distal pancreatectomy, splenectomy, and cholecystectomy. SURGEON: Ja Byrd MD  Final surgical pathology showed necrotizing pancreatitis, no evidence of residual cancer, complete pathologic response, 26 benign lymph nodes.  FINAL DIAGNOSIS: A. DISTAL PANCREAS, SPLEEN AND OMENTUM, RESECTION: - Necrotizing pancreatitis with residual acellular mucin and foci of high grade dysplasia, status post neoadjuvant therapy   - Treatment response: complete (score 0)   - Twenty-six benign lymph nodes (0/26)   - Negative for residual carcinoma   - Pathologic staging: [id number]   - Focal infarction, metaplastic bone formation - Surgical margins are free of neoplasia - Unremarkable spleen and omentum   B. GALLBLADDER, CHOLECYSTECTOMY: - Benign gallbladder, biliary mucosa with no significant histologic abnormality - Negative for dysplasia COMMENT: Histologic sections demonstrate pools of acellular mucin, necrosis and scattered high grade dysplasia (PanIN-3) with no evidence of residual invasive carcinoma    4/30/18 - - surgical oncology follow-up, Dr. Byrd.  5/4/18 - - palliative care follow-up Dr. Snow. No specific recommendations required at this time.  5/14/18 - - oncology follow-up, Dr. Monk. Plan: adjuvant chemotherapy with gemcitabine and Xeloda.  5/30/18 - - cycle 1/day one adjuvant chemotherapy with gemcitabine and  Xeloda.  Cycle three was dose reduced due to mucositis secondary to Xeloda.  8/28/18 - - oncology follow-up, DANTE Talavera. Cycle 4/day one gemcitabine and Xeloda.  9/17/18 - - CT chest/abdomen/pelvis - -IMPRESSION:  Resolving postoperative changes. No new findings or evidence of metastatic disease.  9/24/18 - - oncology follow-up, Dr. Monk. PLAN: initiate active surveillance.  12/21/18--CT c/a/p--IMPRESSION:  1. Stable tiny 2 to 3 mm lung nodules as described above. This  suggests a benign etiology.  2. Two low density liver lesions. These appear to vary in appearance  depending on slight variations in timing of the bolus of contrast.  Likely no significant change. Recommend continued close surveillance.  3. No evidence of recurrent mass in the abdomen or pelvis. No  adenopathy.  12/28/18--scheduled oncology follow-up, Dr Monk. PATIENT NO-SHOW.  12/31/18--oncology follow-up, Dr Monk. Plan: Continue surveillance.  Increase Creon dose due to fatty food intolerance/weight loss.    3/29/19--CT c/a/p-- IMPRESSION: Stable scan compared to 12/21/2018 with the following  findings:  1. Several bilateral small pulmonary nodules, unchanged in appearance.  2. Two right hepatic small hypodense lesions, possibly hemangiomas.  These are also stable in appearance.  3. No apparent recurrent mass.  4/5/19 - - oncology follow-up, Dr. Monk.     7/1/19--CT c/a/p--IMPRESSION:  Stable examination with no convincing metastatic or  recurrent neoplasm.  7/8/19--oncology follow-up, Dr. Monk.    9/30/19 - - CT chest/abdomen/pelvis - - IMPRESSION:  1.  There is a small grouped area of nodularity in the right middle  lobe. Grouped nature favors an inflammatory etiology.  2.  The abdomen CT is stable with no convincing metastatic or  recurrent neoplasm.  10/7/19 - - oncology follow-up, Dr. Monk.    12/13/19 - - CT chest/abdomen/pelvis - - IMPRESSION:  1.  Stable abdomen/pelvis CT with no convincing metastatic or  recurrent neoplasm.  2.  New/additional small focus of grouped nodularity in the right  middle lobe. Inflammatory etiology is still favored.  12/20/19 - - oncology follow-up, Dr. Monk.    4/13/20--CT c/a/p--IMPRESSION:  Interval resolution of suspected inflammatory change in right middle lobe. Otherwise, stable examination.       4/20/20--oncology follow-up, Dr. Monk.      8/3/20--CT c/a/p--IMPRESSION:  Stable examination with no convincing metastatic or  recurrent neoplasm.    August 10, 2020 -- oncology follow-up/virtual visit, Dr. Monk.            Interim History:  Ms. Kitty Bangura joined me today via virtual visit.  Initially, she checked into [employer] but was not able to connect via her iPad.  We tried [employer] and the phone and did a combination of the latter 2.  She was by herself in the call at home and I am in my academic office during this encounter, which takes place during the continued precautionary stay-at-home orders given the coronavirus pandemic.      In general, she has been feeling well.  She walks 2-3 miles per day.  She occasionally gets fatigued enough that she needs to rest but she is doing that on a daily basis.  She has been enjoying golfing with friends and taking precautionary measures due to COVID by wearing a mask and protecting herself and family, as well as doing physical distancing while on the golf course or elsewhere.  She does not have any symptomatic complaints.      She is not developing jaundice.  She has not developed any pain of any kind or other symptoms concerning for pancreas cancer recurrence.  She actually completed her adjuvant course of chemotherapy 2 years ago as of this September.  Her CT chest, abdomen and pelvis done in the last week, specifically on 08/03, shows no evidence of recurrent malignancy.  She had an exceptional response to up-front chemotherapy prior to surgery.  Her CA 19-9 remains unremarkable at 3.             Results for KITTY BANGURA (MRN [id number]) as of 8/10/2020  09:01   Ref. Range 2019 08:48 2020 08:33 8/3/2020 09:55   Cancer Antigen 19-9 Latest Ref Range: 0 - 37 U/mL 2 3 3        Review Of Systems:  Comprehensive 14-point ROS reviewed. Pertinent symptoms reviewed above per HPI.    PAST MEDICAL HISTORY:   Pre-diabetes  Pancreatitis as above     PAST SURGICAL HISTORY:  Laparotomy x2 for ruptured tubal pregnancies    Discectomy for herniated lumbar disk  Breast reduction surgery     FAMILY HISTORY:  Colon cancer in maternal aunt  Paternal aunt and cousins with breast cancer  CAD in father and brother     SH: , from [place], with 30+ yr-old son. Works as [employer]. former smoker, quit 30 years ago. two glasses of wine per night, none since pancreatitis diagnosis     Allergies: none      Current Outpatient Medications:      acetaminophen (TYLENOL) 500 MG tablet, Take 2 tablets (1,000 mg) by mouth every 8 hours, Disp: 100 tablet, Rfl: 1     amylase-lipase-protease (CREON) 57581-35424 units CPEP per EC capsule, Take 2-3 with meals / 1-2 with snacks, up to 15 per day., Disp: 1350 capsule, Rfl: 3     blood glucose (ONETOUCH VERIO IQ) test strip, Use to test blood sugar 4 times daily or as directed., Disp: 400 each, Rfl: 3     clobetasol (TEMOVATE) 0.05 % GEL topical gel, Apply topically 2 times daily, Disp: 15 g, Rfl: 1     metFORMIN (GLUCOPHAGE-XR) 500 MG 24 hr tablet, Take 1 tablet 3 times a day with meals, Disp: 270 tablet, Rfl: 3     OneTouch Delica Lancets 33G MISC, 4 lancets daily, Disp: 150 each, Rfl: 3  No current facility-administered medications for this visit.     Facility-Administered Medications Ordered in Other Visits:      heparin 100 UNIT/ML injection 5 mL, 5 mL, Intracatheter, Once, Art Guevara MD      Physical Exam:  Physical exam could not be performed today in context of a Virtual Visit during the COVID19/Coronavirus pandemic.            Observed physical assessments made today by visualizing the patient by video  link:    General/Constitutional: Generally appears well, not acutely ill.  HEENT: no scleral icterus, not jaundiced.  Respiratory: no labored breathing.  Musculoskeletal: appears to have full range of motion and adequate physical strength.  Skin: no jaundice, discoloration or other notable lesions.  Neurological: no evidence of tremors.  Psychiatric: no evident anxiety; fully alert and oriented with fluent speech.      The rest of a comprehensive physical examination is deferred due to PHE (public health emergency) video visit restrictions.    Laboratory/Imaging Studies  I released the pertinent recent imaging results to [employer] in advance of this visit, and reviewed the summary verbatim and in lay language during today's call.      ASSESSMENT/PLAN:  Mrs. Bangura is a 59 year old woman with resected pancreatic tail adenocarcinoma.  She had initial extensive and severe necrotizing pancreatitis upon initial diagnosis and hospitalization that delayed treatment.  We ultimately were able to begin neoadjuvant FOLFIRINOX for 4 cycles beginning in January.  By the time she had pancreatectomy by Dr. Byrd in mid April there was no evidence of residual carcinoma seen on operative pathology.  She completed subsequently 4 months of gemcitabine and Xeloda in the adjuvant setting.         She is nearly 2 years out from completion of adjuvant chemotherapy with no evidence of progressive or recurrent malignancy.  I encouraged continued active surveillance.  She asked about symptoms that might be a hallmark of recurrent cancer.  We discussed them in great depth today.  I recommended a 4-month surveillance with a CT chest, abdomen and pelvis, CBC, CMP and CA 19-9, all of which I have ordered today for a 4-month interval, which would be in early to mid December.  She will let us know in the interim should she develop any concerns.  She asked about how to know if she had COVID infection so we briefly reviewed fevers, chills and other  pulmonary or GI manifestations that might be typical for patients who develop COVID infection.  She can certainly work with her primary care physician to get tested should she develop any concerning symptoms or let us know as well.  I reviewed all the above.  She had no further questions by the end of the visit.         VIRTUAL VISIT - DETAILS:    I have reviewed the note as documented above. This accurately captures the substance of my conversation with the patient.    Date of call: 8/10/20    Start of call: 9:00 am  End of call:  9:18 am    Provider location: [facility] (academic office)  Patient location: Home      [facility]      I spent 20 minutes prior to the visit reviewing updated information regarding this case, including notes, imaging, labs, and other pertinent results.      Jovany Monk MD PhD            Jovany Monk MD, PhD

## 2020-08-10 ENCOUNTER — VIRTUAL VISIT (OUTPATIENT)
Dept: ONCOLOGY | Facility: CLINIC | Age: 60
End: 2020-08-10
Attending: INTERNAL MEDICINE
Payer: COMMERCIAL

## 2020-08-10 DIAGNOSIS — C25.9 MALIGNANT NEOPLASM OF PANCREAS, UNSPECIFIED LOCATION OF MALIGNANCY (H): Primary | ICD-10-CM

## 2020-08-10 PROCEDURE — 40001009 ZZH VIDEO/TELEPHONE VISIT; NO CHARGE

## 2020-08-10 PROCEDURE — 99213 OFFICE O/P EST LOW 20 MIN: CPT | Mod: 95 | Performed by: INTERNAL MEDICINE

## 2020-08-10 NOTE — LETTER
8/10/2020         RE: Kitty Bangura  [address] [facility] [address]        Dear Colleague,    Thank you for referring your patient, Kitty Bangura, to the [facility]. Please see a copy of my visit note below.        Oncology Follow-up Visit:  Aug 10, 2020    Cancer diagnosis: cystic pancreatic tail adenocarcinoma  small subcentimeter pulmonary nodules seen on staging scans of unclear etiology.     Treatment to date: neoadjuvant FOLFIRINOX x4 cycles, 1/15/18-3/6/18  Difficulty tolerating neoadjuvant intent chemotherapy. Distal pancreatectomy performed April 17, 2018, no residual carcinoma seen on operative pathology.  Treated with four cycles of adjuvant chemotherapy with gemcitabine and Xeloda, completed September 2018.     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     Referring physician: Dr. González Metz MD    Oncology History: She was previously healthy until she presented in early November 2017 with abdominal pain. CT abdomen on 11/1/17 showed acute pancreatitis (lipase 41,960) and a 3cm heterogenous pancreatic tail mass. The etiology of pancreatitis is unclear - no obstructing gallstone and not a heavy drinker. She was hospitalized for one week and followed up with Dr. González Metz in GI clinic.     11/29/17 -- endoscopic drainage of walled-off area of pancreatic necrosis by Dr. Metz. During the same procedure she had an EUS FNA of the pancreatic tail mass and pathology showed adenocarcinoma. The mass measured 33 x 27 mm, encapsulated with solid and cystic components, rim calcification, and no evidence of invasion.     12/9/17 -- Staging CT chest/abd/pelvis showed several small pulmonary nodules (largest 3mm), unchanged mass in the pancreatic tail, mildly prominent mesenteric nodes  thought likely reactive, and small right hepatic lobe hypodensities. Abdominal MRI on 12/10/17 showed hepatic hemangiomas, no evidence of metastatic disease to the liver.     1/8/18 - - oncology consultation, Dr. Monk. Recommendation: neoadjuvant intent chemotherapy with FOLFIRINOX.    1/15/18 - - cycle 1/day one FOLFIRINOX chemotherapy.    1/20 through 1/27/18 - - hospitalization at the [facility], for acute pancreatitis. Following three days of nausea, vomiting, pain. During this hospitalization: ERCP was attempted by Dr. Sanches with pancreatic stent placement, but pancreatic duct was completely obstructed and wire was unable to pass beyond the duct. Dr. Metz performed successful US guided cyst gastrostomy January 25, 2018.    1/31/18 - - oncology follow-up, DANTE Talavera. Neutropenic with ANC 0.4, thus defer cycle two of chemotherapy.    2/5/18 - - oncology follow-up, DANTE Junior.  Cycle 2/day one FOLFIRINOX chemotherapy. Patient endorses cold sensitivity secondary to oxaliplatin. Neulasta given for growth factor support. Due to significant neutropenia with cycle one.    2/11/18 - - ER evaluation for epigastric pain. Discharged to home from ER. Leukocytosis secondary to Neulasta given on February 8.    2/20/18 - - oncology follow-up, DNATE Junior. Cycle 3/day one FOLFIRINOX given without Neulasta. At patient request, oxaliplatin dose reduced by 10% due to neuropathy/cold sensitivity.    3/6/18 - - oncology follow-up, DANTE Talavera. Cycle 4/day one FOLFIRINOX chemotherapy.    3/13/18 - - CT chest/abdomen/pelvis - - IMPRESSION: 1. Minimal decrease in size and marked decrease in enhancement and pancreatic mass since the comparison study. 2. No significant change in low dense lesions in the liver since comparison.    3/16/18 - - oncology follow-up Dr. Monk. Plan is to hold on further chemotherapy as surgery is scheduled for 4/17/18 4/17/18 - - surgery: PROCEDURE:  Diagnostic laparoscopy, splenic flexure mobilization, intraoperative ultrasound, distal pancreatectomy, splenectomy, and cholecystectomy. SURGEON: Ja Byrd MD  Final surgical pathology showed necrotizing pancreatitis, no evidence of residual cancer, complete pathologic response, 26 benign lymph nodes.  FINAL DIAGNOSIS: A. DISTAL PANCREAS, SPLEEN AND OMENTUM, RESECTION: - Necrotizing pancreatitis with residual acellular mucin and foci of high grade dysplasia, status post neoadjuvant therapy   - Treatment response: complete (score 0)   - Twenty-six benign lymph nodes (0/26)   - Negative for residual carcinoma   - Pathologic staging: [id number]   - Focal infarction, metaplastic bone formation - Surgical margins are free of neoplasia - Unremarkable spleen and omentum   B. GALLBLADDER, CHOLECYSTECTOMY: - Benign gallbladder, biliary mucosa with no significant histologic abnormality - Negative for dysplasia COMMENT: Histologic sections demonstrate pools of acellular mucin, necrosis and scattered high grade dysplasia (PanIN-3) with no evidence of residual invasive carcinoma    4/30/18 - - surgical oncology follow-up, Dr. Byrd.  5/4/18 - - palliative care follow-up Dr. Snow. No specific recommendations required at this time.  5/14/18 - - oncology follow-up, Dr. Monk. Plan: adjuvant chemotherapy with gemcitabine and Xeloda.  5/30/18 - - cycle 1/day one adjuvant chemotherapy with gemcitabine and Xeloda.  Cycle three was dose reduced due to mucositis secondary to Xeloda.  8/28/18 - - oncology follow-up, DANTE Talavera. Cycle 4/day one gemcitabine and Xeloda.  9/17/18 - - CT chest/abdomen/pelvis - -IMPRESSION:  Resolving postoperative changes. No new findings or evidence of metastatic disease.  9/24/18 - - oncology follow-up, Dr. Monk. PLAN: initiate active surveillance.  12/21/18--CT c/a/p--IMPRESSION:  1. Stable tiny 2 to 3 mm lung nodules as described above. This  suggests a benign etiology.  2. Two low density  liver lesions. These appear to vary in appearance  depending on slight variations in timing of the bolus of contrast.  Likely no significant change. Recommend continued close surveillance.  3. No evidence of recurrent mass in the abdomen or pelvis. No  adenopathy.  12/28/18--scheduled oncology follow-up, Dr Monk. PATIENT NO-SHOW.  12/31/18--oncology follow-up, Dr Monk. Plan: Continue surveillance.  Increase Creon dose due to fatty food intolerance/weight loss.    3/29/19--CT c/a/p-- IMPRESSION: Stable scan compared to 12/21/2018 with the following  findings:  1. Several bilateral small pulmonary nodules, unchanged in appearance.  2. Two right hepatic small hypodense lesions, possibly hemangiomas.  These are also stable in appearance.  3. No apparent recurrent mass.  4/5/19 - - oncology follow-up, Dr. Monk.     7/1/19--CT c/a/p--IMPRESSION:  Stable examination with no convincing metastatic or  recurrent neoplasm.  7/8/19--oncology follow-up, Dr. Monk.    9/30/19 - - CT chest/abdomen/pelvis - - IMPRESSION:  1.  There is a small grouped area of nodularity in the right middle  lobe. Grouped nature favors an inflammatory etiology.  2.  The abdomen CT is stable with no convincing metastatic or  recurrent neoplasm.  10/7/19 - - oncology follow-up, Dr. Monk.    12/13/19 - - CT chest/abdomen/pelvis - - IMPRESSION:  1.  Stable abdomen/pelvis CT with no convincing metastatic or  recurrent neoplasm.  2. New/additional small focus of grouped nodularity in the right  middle lobe. Inflammatory etiology is still favored.  12/20/19 - - oncology follow-up, Dr. Monk.    4/13/20--CT c/a/p--IMPRESSION:  Interval resolution of suspected inflammatory change in right middle lobe. Otherwise, stable examination.       4/20/20--oncology follow-up, Dr. Monk.      8/3/20--CT c/a/p--IMPRESSION:  Stable examination with no convincing metastatic or  recurrent neoplasm.    August 10, 2020 -- oncology follow-up/virtual visit, Dr. Monk.            Interim  History:  Ms. Kitty Bangura joined me today via virtual visit.  Initially, she checked into [employer] but was not able to connect via her iPad.  We tried [employer] and the phone and did a combination of the latter 2.  She was by herself in the call at home and I am in my academic office during this encounter, which takes place during the continued precautionary stay-at-home orders given the coronavirus pandemic.      In general, she has been feeling well.  She walks 2-3 miles per day.  She occasionally gets fatigued enough that she needs to rest but she is doing that on a daily basis.  She has been enjoying golfing with friends and taking precautionary measures due to COVID by wearing a mask and protecting herself and family, as well as doing physical distancing while on the golf course or elsewhere.  She does not have any symptomatic complaints.      She is not developing jaundice.  She has not developed any pain of any kind or other symptoms concerning for pancreas cancer recurrence.  She actually completed her adjuvant course of chemotherapy 2 years ago as of this September.  Her CT chest, abdomen and pelvis done in the last week, specifically on , shows no evidence of recurrent malignancy.  She had an exceptional response to up-front chemotherapy prior to surgery.  Her CA 19-9 remains unremarkable at 3.             Results for KITTY BANGURA (MRN [id number]) as of 8/10/2020 09:01   Ref. Range 2019 08:48 2020 08:33 8/3/2020 09:55   Cancer Antigen 19-9 Latest Ref Range: 0 - 37 U/mL 2 3 3        Review Of Systems:  Comprehensive 14-point ROS reviewed. Pertinent symptoms reviewed above per HPI.    PAST MEDICAL HISTORY:   Pre-diabetes  Pancreatitis as above     PAST SURGICAL HISTORY:  Laparotomy x2 for ruptured tubal pregnancies    Discectomy for herniated lumbar disk  Breast reduction surgery     FAMILY HISTORY:  Colon cancer in maternal aunt  Paternal aunt and cousins with breast  cancer  CAD in father and brother     SH: , from [place], with 30+ yr-old son. Works as . former smoker, quit 30 years ago. two glasses of wine per night, none since pancreatitis diagnosis     Allergies: none      Current Outpatient Medications:      acetaminophen (TYLENOL) 500 MG tablet, Take 2 tablets (1,000 mg) by mouth every 8 hours, Disp: 100 tablet, Rfl: 1     amylase-lipase-protease (CREON) 66239-19108 units CPEP per EC capsule, Take 2-3 with meals / 1-2 with snacks, up to 15 per day., Disp: 1350 capsule, Rfl: 3     blood glucose (ONETOUCH VERIO IQ) test strip, Use to test blood sugar 4 times daily or as directed., Disp: 400 each, Rfl: 3     clobetasol (TEMOVATE) 0.05 % GEL topical gel, Apply topically 2 times daily, Disp: 15 g, Rfl: 1     metFORMIN (GLUCOPHAGE-XR) 500 MG 24 hr tablet, Take 1 tablet 3 times a day with meals, Disp: 270 tablet, Rfl: 3     OneTouch Delica Lancets 33G MISC, 4 lancets daily, Disp: 150 each, Rfl: 3  No current facility-administered medications for this visit.     Facility-Administered Medications Ordered in Other Visits:      heparin 100 UNIT/ML injection 5 mL, 5 mL, Intracatheter, Once, Art Guevara MD      Physical Exam:  Physical exam could not be performed today in context of a Virtual Visit during the COVID19/Coronavirus pandemic.       Observed physical assessments made today by visualizing the patient by video link:    General/Constitutional: Generally appears well, not acutely ill.  HEENT: no scleral icterus, not jaundiced.  Respiratory: no labored breathing.  Musculoskeletal: appears to have full range of motion and adequate physical strength.  Skin: no jaundice, discoloration or other notable lesions.  Neurological: no evidence of tremors.  Psychiatric: no evident anxiety; fully alert and oriented with fluent speech.      The rest of a comprehensive physical examination is deferred due to PHE (public health emergency) video visit  restrictions.    Laboratory/Imaging Studies  I released the pertinent recent imaging results to [facility] in advance of this visit, and reviewed the summary verbatim and in lay language during today's call.      ASSESSMENT/PLAN:  Mrs. Bangura is a 59 year old woman with resected pancreatic tail adenocarcinoma.  She had initial extensive and severe necrotizing pancreatitis upon initial diagnosis and hospitalization that delayed treatment.  We ultimately were able to begin neoadjuvant FOLFIRINOX for 4 cycles beginning in January.  By the time she had pancreatectomy by Dr. Byrd in mid April there was no evidence of residual carcinoma seen on operative pathology.  She completed subsequently 4 months of gemcitabine and Xeloda in the adjuvant setting.         She is nearly 2 years out from completion of adjuvant chemotherapy with no evidence of progressive or recurrent malignancy.  I encouraged continued active surveillance.  She asked about symptoms that might be a hallmark of recurrent cancer.  We discussed them in great depth today.  I recommended a 4-month surveillance with a CT chest, abdomen and pelvis, CBC, CMP and CA 19-9, all of which I have ordered today for a 4-month interval, which would be in early to mid December.  She will let us know in the interim should she develop any concerns.  She asked about how to know if she had COVID infection so we briefly reviewed fevers, chills and other pulmonary or GI manifestations that might be typical for patients who develop COVID infection.  She can certainly work with her primary care physician to get tested should she develop any concerning symptoms or let us know as well.  I reviewed all the above.  She had no further questions by the end of the visit.         VIRTUAL VISIT - DETAILS:    I have reviewed the note as documented above. This accurately captures the substance of my conversation with the patient.    Date of call: 8/10/20    Start of call: 9:00 am  End of  call:  9:18 am    Provider location: [facility] (academic office)  Patient location: Home      Mode of Video Visit: Zonia      I spent 20 minutes prior to the visit reviewing updated information regarding this case, including notes, imaging, labs, and other pertinent results.      Jovany Monk MD PhD

## 2020-09-20 DIAGNOSIS — E11.9 TYPE 2 DIABETES MELLITUS WITHOUT COMPLICATION, WITHOUT LONG-TERM CURRENT USE OF INSULIN (H): ICD-10-CM

## 2020-09-22 RX ORDER — METFORMIN HCL 500 MG
500 TABLET, EXTENDED RELEASE 24 HR ORAL
Qty: 270 TABLET | Refills: 0 | Status: SHIPPED | OUTPATIENT
Start: 2020-09-22 | End: 2020-10-23

## 2020-09-22 NOTE — TELEPHONE ENCOUNTER
metFORMIN (GLUCOPHAGE-XR) 500 MG 24 hr tablet       Last Written Prescription Date:  7/22/2019  Last Fill Quantity: 270,   # refills: 3  Last Office Visit : 7/22/2019  Future Office visit:  none    Routing refill request to provider for review/approval because:  1. Lab past due.  - A1C If less than 8, must be on file within the past 6 months.  Hemoglobin A1C   Date Value Ref Range Status   10/14/2019 6.7 (H) 0 - 5.6 % Final     Comment:     Normal <5.7% Prediabetes 5.7-6.4%  Diabetes 6.5% or higher - adopted from ADA   consensus guidelines.       2. Last visit 7/22/2019 and no pending appointment.   - Message was sent to Endocrine scheduling.

## 2020-09-24 DIAGNOSIS — K85.91 NECROTIZING PANCREATITIS: ICD-10-CM

## 2020-09-25 ENCOUNTER — TELEPHONE (OUTPATIENT)
Dept: ENDOCRINOLOGY | Facility: CLINIC | Age: 60
End: 2020-09-25

## 2020-09-25 NOTE — TELEPHONE ENCOUNTER
----- Message from Wanda Daniels RN sent at 9/22/2020  2:48 PM CDT -----  Regarding: appointment  Please call patient to schedule Endocrine appointment. Last visit 7/22/2019 and no pending appointment.     Thank you, Wanda DXION RN Med Refill Team    I do not need any follow up on the scheduling of this appointment unless the patient will no longer be receiving care in our clinic.

## 2020-09-25 NOTE — TELEPHONE ENCOUNTER
CLINIC COORDINATOR SCHEDULING NOTES    CALL RESULT: LVM    APPT TYPE: VIRTUAL VISIT RETURN    PROVIDER: [facility]    DATE APPT NEEDED: 1st available    ADDITIONAL NOTES: Overdue for follow-up

## 2020-10-01 DIAGNOSIS — E11.9 TYPE 2 DIABETES MELLITUS WITHOUT COMPLICATION, WITHOUT LONG-TERM CURRENT USE OF INSULIN (H): Primary | ICD-10-CM

## 2020-10-05 ENCOUNTER — MYC MEDICAL ADVICE (OUTPATIENT)
Dept: ONCOLOGY | Facility: CLINIC | Age: 60
End: 2020-10-05

## 2020-10-05 NOTE — TELEPHONE ENCOUNTER
[facility], spoke to Daxa. Per chart review a script for Creon was sent 9/24/20. The pharmacy had canceled this script inadvertently and is requesting a new script. Spoke to pharmacy tech, called in script as listed on 9/24/20 MD order.

## 2020-10-15 DIAGNOSIS — K85.91 NECROTIZING PANCREATITIS: ICD-10-CM

## 2020-10-15 NOTE — PROGRESS NOTES
[facility] called with 2 issues;  1) requesting a 90 day supply of creon. As Pt was Rx'd a 30 day Rx w/11 refill this is OK  2) Rx Qty was 90. This is only a 4-10 day supply. Spoke with Dr Monk, Qty should be updated to reflect 15 pills max/day. This is 450/30 days, or 1350 for a 90 day supply.    Returned call to pharmacy and gave TORB update, altered Pt med record to reflect change.

## 2020-10-23 ENCOUNTER — OFFICE VISIT (OUTPATIENT)
Dept: FAMILY MEDICINE | Facility: CLINIC | Age: 60
End: 2020-10-23
Payer: COMMERCIAL

## 2020-10-23 VITALS
OXYGEN SATURATION: 95 % | TEMPERATURE: 97.8 F | BODY MASS INDEX: 21.79 KG/M2 | DIASTOLIC BLOOD PRESSURE: 78 MMHG | RESPIRATION RATE: 12 BRPM | WEIGHT: 135.6 LBS | HEIGHT: 66 IN | HEART RATE: 67 BPM | SYSTOLIC BLOOD PRESSURE: 130 MMHG

## 2020-10-23 DIAGNOSIS — Z23 NEED FOR PROPHYLACTIC VACCINATION AND INOCULATION AGAINST INFLUENZA: ICD-10-CM

## 2020-10-23 DIAGNOSIS — E11.9 TYPE 2 DIABETES MELLITUS WITHOUT COMPLICATION, WITHOUT LONG-TERM CURRENT USE OF INSULIN (H): ICD-10-CM

## 2020-10-23 DIAGNOSIS — K85.91 NECROTIZING PANCREATITIS: ICD-10-CM

## 2020-10-23 DIAGNOSIS — Z00.00 ENCOUNTER FOR MEDICARE ANNUAL WELLNESS EXAM: Primary | ICD-10-CM

## 2020-10-23 LAB
ALBUMIN SERPL-MCNC: 4.2 G/DL (ref 3.4–5)
ALP SERPL-CCNC: 85 U/L (ref 40–150)
ALT SERPL W P-5'-P-CCNC: 23 U/L (ref 0–50)
ANION GAP SERPL CALCULATED.3IONS-SCNC: 4 MMOL/L (ref 3–14)
AST SERPL W P-5'-P-CCNC: 17 U/L (ref 0–45)
BILIRUB SERPL-MCNC: 0.6 MG/DL (ref 0.2–1.3)
BUN SERPL-MCNC: 15 MG/DL (ref 7–30)
CALCIUM SERPL-MCNC: 9.5 MG/DL (ref 8.5–10.1)
CHLORIDE SERPL-SCNC: 101 MMOL/L (ref 94–109)
CHOLEST SERPL-MCNC: 191 MG/DL
CO2 SERPL-SCNC: 31 MMOL/L (ref 20–32)
CREAT SERPL-MCNC: 0.64 MG/DL (ref 0.52–1.04)
GFR SERPL CREATININE-BSD FRML MDRD: >90 ML/MIN/{1.73_M2}
GLUCOSE SERPL-MCNC: 98 MG/DL (ref 70–99)
HBA1C MFR BLD: 6.2 % (ref 0–5.6)
HDLC SERPL-MCNC: 87 MG/DL
LDLC SERPL CALC-MCNC: 83 MG/DL
NONHDLC SERPL-MCNC: 104 MG/DL
POTASSIUM SERPL-SCNC: 4 MMOL/L (ref 3.4–5.3)
PROT SERPL-MCNC: 8.4 G/DL (ref 6.8–8.8)
SODIUM SERPL-SCNC: 136 MMOL/L (ref 133–144)
TRIGL SERPL-MCNC: 104 MG/DL

## 2020-10-23 PROCEDURE — G0008 ADMIN INFLUENZA VIRUS VAC: HCPCS | Performed by: FAMILY MEDICINE

## 2020-10-23 PROCEDURE — 83036 HEMOGLOBIN GLYCOSYLATED A1C: CPT | Performed by: FAMILY MEDICINE

## 2020-10-23 PROCEDURE — 99214 OFFICE O/P EST MOD 30 MIN: CPT | Mod: 25 | Performed by: FAMILY MEDICINE

## 2020-10-23 PROCEDURE — 80053 COMPREHEN METABOLIC PANEL: CPT | Performed by: FAMILY MEDICINE

## 2020-10-23 PROCEDURE — 36415 COLL VENOUS BLD VENIPUNCTURE: CPT | Performed by: FAMILY MEDICINE

## 2020-10-23 PROCEDURE — 90682 RIV4 VACC RECOMBINANT DNA IM: CPT | Performed by: FAMILY MEDICINE

## 2020-10-23 PROCEDURE — 99396 PREV VISIT EST AGE 40-64: CPT | Mod: 25 | Performed by: FAMILY MEDICINE

## 2020-10-23 PROCEDURE — 80061 LIPID PANEL: CPT | Performed by: FAMILY MEDICINE

## 2020-10-23 RX ORDER — METFORMIN HCL 500 MG
500 TABLET, EXTENDED RELEASE 24 HR ORAL 2 TIMES DAILY WITH MEALS
Qty: 180 TABLET | Refills: 3 | Status: SHIPPED | OUTPATIENT
Start: 2020-10-23 | End: 2021-11-09

## 2020-10-23 ASSESSMENT — MIFFLIN-ST. JEOR: SCORE: 1196.83

## 2020-10-23 ASSESSMENT — ACTIVITIES OF DAILY LIVING (ADL): CURRENT_FUNCTION: NO ASSISTANCE NEEDED

## 2020-10-23 NOTE — PATIENT INSTRUCTIONS
Preventive Health Recommendations  Female Ages 50 - 64    Yearly exam: See your health care provider every year in order to  o Review health changes.   o Discuss preventive care.    o Review your medicines if your doctor has prescribed any.      Get a Pap test every three years (unless you have an abnormal result and your provider advises testing more often).    If you get Pap tests with HPV test, you only need to test every 5 years, unless you have an abnormal result.     You do not need a Pap test if your uterus was removed (hysterectomy) and you have not had cancer.    You should be tested each year for STDs (sexually transmitted diseases) if you're at risk.     Have a mammogram every 1 to 2 years.    Have a colonoscopy at age 50, or have a yearly FIT test (stool test). These exams screen for colon cancer.      Have a cholesterol test every 5 years, or more often if advised.    Have a diabetes test (fasting glucose) every three years. If you are at risk for diabetes, you should have this test more often.     If you are at risk for osteoporosis (brittle bone disease), think about having a bone density scan (DEXA).    Shots: Get a flu shot each year. Get a tetanus shot every 10 years.    Nutrition:     Eat at least 5 servings of fruits and vegetables each day.    Eat whole-grain bread, whole-wheat pasta and brown rice instead of white grains and rice.    Get adequate Calcium and Vitamin D.     Lifestyle    Exercise at least 150 minutes a week (30 minutes a day, 5 days a week). This will help you control your weight and prevent disease.    Limit alcohol to one drink per day.    No smoking.     Wear sunscreen to prevent skin cancer.     See your dentist every six months for an exam and cleaning.    See your eye doctor every 1 to 2 years.    Patient Education   Personalized Prevention Plan  You are due for the preventive services outlined below.  Your care team is available to assist you in scheduling these  services.  If you have already completed any of these items, please share that information with your care team to update in your medical record.  Health Maintenance Due   Topic Date Due     Diabetic Foot Exam  1960     Hepatitis C Screening  1960     Eye Exam  1960     HIV Screening  12/04/1973     Hepatitis B Vaccine (1 of 3 - Risk 3-dose series) 12/04/1977     Kidney Microalbumin Urine Test  08/02/2020     Annual Wellness Visit  10/14/2020

## 2020-10-23 NOTE — LETTER
October 27, 2020      Kitty Bangura  [address] [facility] [id number]        Dear ,    We are writing to inform you of your test results.      Your test results fall within the expected range(s) or remain unchanged from previous results.  Please continue with current treatment plan.    Resulted Orders   **A1C FUTURE anytime   Result Value Ref Range    Hemoglobin A1C 6.2 (H) 0 - 5.6 %      Comment:      Normal <5.7% Prediabetes 5.7-6.4%  Diabetes 6.5% or higher - adopted from ADA   consensus guidelines.     Lipid panel reflex to direct LDL Fasting   Result Value Ref Range    Cholesterol 191 <200 mg/dL    Triglycerides 104 <150 mg/dL    HDL Cholesterol 87 >49 mg/dL    LDL Cholesterol Calculated 83 <100 mg/dL      Comment:      Desirable:       <100 mg/dl    Non HDL Cholesterol 104 <130 mg/dL   Comprehensive metabolic panel (BMP + Alb, Alk Phos, ALT, AST, Total. Bili, TP)   Result Value Ref Range    Sodium 136 133 - 144 mmol/L    Potassium 4.0 3.4 - 5.3 mmol/L    Chloride 101 94 - 109 mmol/L    Carbon Dioxide 31 20 - 32 mmol/L    Anion Gap 4 3 - 14 mmol/L    Glucose 98 70 - 99 mg/dL    Urea Nitrogen 15 7 - 30 mg/dL    Creatinine 0.64 0.52 - 1.04 mg/dL    GFR Estimate >90 >60 mL/min/[1.73_m2]      Comment:      Non  GFR Calc  Starting 12/18/2018, serum creatinine based estimated GFR (eGFR) will be   calculated using the Chronic Kidney Disease Epidemiology Collaboration   (CKD-EPI) equation.      GFR Estimate If Black >90 >60 mL/min/[1.73_m2]      Comment:       GFR Calc  Starting 12/18/2018, serum creatinine based estimated GFR (eGFR) will be   calculated using the Chronic Kidney Disease Epidemiology Collaboration   (CKD-EPI) equation.      Calcium 9.5 8.5 - 10.1 mg/dL    Bilirubin Total 0.6 0.2 - 1.3 mg/dL    Albumin 4.2 3.4 - 5.0 g/dL    Protein Total 8.4 6.8 - 8.8 g/dL    Alkaline Phosphatase 85 40 - 150 U/L    ALT 23 0 - 50 U/L    AST 17 0 - 45 U/L       If  you have any questions or concerns, please call the clinic at the number listed above.       Sincerely,        Solange Mcgill MD/bmc

## 2020-10-23 NOTE — PROGRESS NOTES
SUBJECTIVE:   CC: Kitty Bangura is an 59 year old woman who presents for preventive health visit.       Patient is a 59 yr old female here for her annual physical. She has a pas medical history significant for Type II diabetes , history of pancreatic adenocarcinoma. She has been feeling and denies any acute symptoms. Her diabetes is still under good control. She is taking metformin for control. She needs refills on her medication. Preventive measures were discussed. She has had her pap smear and also she is up to date with colonoscopy . She has all her immunizations up to date.    {Split Bill scripting  The purpose of this visit is to discuss your medical history and prevent health problems before you are sick. You may be responsible for a co-pay, coinsurance, or deductible if your visit today includes services such as checking on a sore throat, having an x-ray or lab test, or treating and evaluating a new or existing condition   Patient has been advised of split billing requirements and indicates understanding: Yes  Healthy Habits:    In general, how would you rate your overall health?  Very good    Frequency of exercise:  6-7 days/week    Duration of exercise:  30-45 minutes    Taking medications regularly:  Yes    Barriers to taking medications:  None    Medication side effects:  None    Ability to successfully perform activities of daily living:  No assistance needed    Home Safety:  No safety concerns identified    Hearing Impairment:  No hearing concerns    In the past 6 months, have you been bothered by leaking of urine?  No    In general, how would you rate your overall mental or emotional health?  Very good      PHQ-2 Total Score:    Additional concerns today:  No    Ability to successfully perform activities of daily living: Yes, no assistance needed  Home safety:  none identified   Hearing impairment: Yes,   Right Ear:      1000 Hz RESPONSE- on Level: 40 db (Conditioning sound)   1000 Hz: RESPONSE-  on Level:   20 db    2000 Hz: RESPONSE- on Level:   20 db    4000 Hz: RESPONSE- on Level:   20 db     Left Ear:      4000 Hz: RESPONSE- on Level:   20 db    2000 Hz: RESPONSE- on Level:   20 db    1000 Hz: RESPONSE- on Level:   20 db     500 Hz: RESPONSE- on Level: 25 db    Right Ear:    500 Hz: RESPONSE- on Level: 25 db    Hearing Acuity: Pass    Hearing Assessment: normal        Diabetes Follow-up    How often are you checking your blood sugar? One time daily  What time of day are you checking your blood sugars (select all that apply)?  in the morning  Have you had any blood sugars above 200?  No  Have you had any blood sugars below 70?  No    What symptoms do you notice when your blood sugar is low?  Shaky    What concerns do you have today about your diabetes? None     Do you have any of these symptoms? (Select all that apply)  Burning in feet    Have you had a diabetic eye exam in the last 12 months? No        BP Readings from Last 2 Encounters:   10/23/20 130/78   07/14/20 118/82     Hemoglobin A1C (%)   Date Value   10/23/2020 6.2 (H)   10/14/2019 6.7 (H)     LDL Cholesterol Calculated (mg/dL)   Date Value   10/23/2020 83   10/14/2019 77           Today's PHQ-2 Score:   PHQ-2 ( 1999 Pf[employer]) 7/14/2020   Q1: Little interest or pleasure in doing things 0   Q2: Feeling down, depressed or hopeless 0   PHQ-2 Score 0   Q1: Little interest or pleasure in doing things -   Q2: Feeling down, depressed or hopeless -   PHQ-2 Score -       Abuse: Current or Past (Physical, Sexual or Emotional) - No  Do you feel safe in your environment? Yes    Have you ever done Advance Care Planning? (For example, a Health Directive, POLST, or a discussion with a medical provider or your loved ones about your wishes): Yes, advance care planning is on file.    Social History     Tobacco Use     Smoking status: Former Smoker     Packs/day: 0.50     Years: 5.00     Pack years: 2.50     Types: Cigarettes     Start date: 1/1/1974     Quit  date:      Years since quittin.8     Smokeless tobacco: Never Used   Substance Use Topics     Alcohol use: No     Alcohol/week: 21.0 standard drinks     Comment: Now quit since Oct 31.  Moderate drinker in past     If you drink alcohol do you typically have >3 drinks per day or >7 drinks per week? No    Alcohol Use 10/23/2020   Prescreen: >3 drinks/day or >7 drinks/week? -   Prescreen: >3 drinks/day or >7 drinks/week? No       Reviewed orders with patient.  Reviewed health maintenance and updated orders accordingly - Yes  Labs reviewed in EPIC  BP Readings from Last 3 Encounters:   10/23/20 130/78   20 118/82   19 90/64    Wt Readings from Last 3 Encounters:   10/23/20 61.5 kg (135 lb 9.6 oz)   20 60.3 kg (133 lb)   20 60.3 kg (133 lb)                  Patient Active Problem List   Diagnosis     Acute pancreatitis     Leukocytosis     Fatty liver     Pancreatic mass     Necrotizing pancreatitis     Pancreatic adenocarcinoma (H)     Anemia     Thrombocytopenia (H)     Type 2 diabetes mellitus without complication, without long-term current use of insulin (H)     Drug-induced polyneuropathy (H)     Past Surgical History:   Procedure Laterality Date     ABDOMEN SURGERY      Ruptured tubal pregnancies, additional surgeries followed     BACK SURGERY  2004    L5, S1 discectomy     BREAST SURGERY      Breast reduction     COLONOSCOPY       COLONOSCOPY N/A 2018    Procedure: colonoscopy;  Surgeon: Lobito Marte MD;  Location: [facility]     ENDOSCOPIC RETROGRADE CHOLANGIOPANCREATOGRAM N/A 2018    Procedure: COMBINED ENDOSCOPIC RETROGRADE CHOLANGIOPANCREATOGRAPHY, PLACE TUBE/STENT;  Endoscopic retrograde cholangiopancreatogram with stent placement and cyst drainage bile ;  Surgeon: Vito Sanches MD;  Location: [facility]     ENDOSCOPIC ULTRASOUND LOWER GASTROINTESTIONAL TRACT (GI) N/A 2018    Procedure: ENDOSCOPIC ULTRASOUND LOWER GASTROINTESTIONAL TRACT (GI);   Endoscopic Ultrasound with guided Cyst-Gastrostomy;  Surgeon: González Metz MD;  Location: [facility]     ENDOSCOPIC ULTRASOUND, ESOPHAGOSCOPY, GASTROSCOPY, DUODENOSCOPY (EGD), NECROSECTOMY N/A 2017    Procedure: ENDOSCOPIC ULTRASOUND, ESOPHAGOSCOPY, GASTROSCOPY, DUODENOSCOPY (EGD), NECROSECTOMY;  Esophagogastroduodenoscopy, with  Necrosectomy and stents replacement  ;  Surgeon: González Metz MD;  Location: [facility]     ENDOSCOPIC ULTRASOUND, ESOPHAGOSCOPY, GASTROSCOPY, DUODENOSCOPY (EGD), NECROSECTOMY N/A 2017    Procedure: ENDOSCOPIC ULTRASOUND, ESOPHAGOSCOPY, GASTROSCOPY, DUODENOSCOPY (EGD), NECROSECTOMY;  Esophagogastroduodenoscopy with Necrosectomy, Balloon Dilation, stent removal X3 and Cystgastrostomy stent Placement X2;  Surgeon: Guru Cecilia Staples MD;  Location: [facility]     ESOPHAGOSCOPY, GASTROSCOPY, DUODENOSCOPY (EGD), COMBINED N/A 2017    Procedure: COMBINED ENDOSCOPIC ULTRASOUND, ESOPHAGOSCOPY, GASTROSCOPY, DUODENOSCOPY (EGD), FINE NEEDLE ASPIRATE/BIOPSY;  Endoscopic Ultrasound with cystgastrostomy and fine needle aspiration ;  Surgeon: González Metz MD;  Location: [facility]     ESOPHAGOSCOPY, GASTROSCOPY, DUODENOSCOPY (EGD), COMBINED N/A 2018    Procedure: COMBINED ESOPHAGOSCOPY, GASTROSCOPY, DUODENOSCOPY (EGD);  EGD;  Surgeon: González Metz MD;  Location:  GI     ESOPHAGOSCOPY, GASTROSCOPY, DUODENOSCOPY (EGD), COMBINED N/A 2018    Procedure: COMBINED ESOPHAGOSCOPY, GASTROSCOPY, DUODENOSCOPY (EGD), REMOVE FOREIGN BODY;;  Surgeon: González Metz MD;  Location:  GI     GYN SURGERY  1983    Multiple ectopic pregnancies, reconnect,  1988     INSERT PORT VASCULAR ACCESS Right 2018    Procedure: INSERT PORT VASCULAR ACCESS;  Chest Port Placement - right;  Surgeon: Chanda Lawson PA-C;  Location: [facility]     IR PORT REMOVAL RIGHT  2019     LAPAROSCOPY DIAGNOSTIC (GENERAL) N/A 2018    Procedure: LAPAROSCOPY DIAGNOSTIC  (GENERAL);  Diagnostic Laparoscopy; Open Distal Pancreatectomy And Splenectomy, splenic flexure mobilization, cholecystectomy, intraoperative ultrasound;  Surgeon: Ja Byrd MD;  Location: [facility]     PANCREATECTOMY, SPLENECTOMY N/A 2018    Procedure: PANCREATECTOMY, SPLENECTOMY;;  Surgeon: Ja Byrd MD;  Location: [facility]     REMOVE PORT VASCULAR ACCESS Right 2019    Procedure: Right Port Removal;  Surgeon: Juan J Donaldson PA-C;  Location:  OR       Social History     Tobacco Use     Smoking status: Former Smoker     Packs/day: 0.50     Years: 5.00     Pack years: 2.50     Types: Cigarettes     Start date: 1974     Quit date:      Years since quittin.8     Smokeless tobacco: Never Used   Substance Use Topics     Alcohol use: No     Alcohol/week: 21.0 standard drinks     Comment: Now quit since Oct 31.  Moderate drinker in past     Family History   Problem Relation Age of Onset     Heart Disease Father      Hyperlipidemia Father      Heart Disease Brother      Diabetes Mother      Hypertension Mother      Obesity Mother      Depression Mother      Diabetes Maternal Grandfather      Hypertension Maternal Grandfather      Obesity Maternal Grandfather      Diabetes Sister      Hypertension Sister      Depression Sister      Anxiety Disorder Sister      Obesity Sister      Hypertension Brother      Hyperlipidemia Brother      Heart Disease Brother      Breast Cancer Cousin      Breast Cancer Cousin      Breast Cancer Cousin      Colon Cancer Other      Other Cancer Other         Mesothelioma     Depression Sister      Anxiety Disorder Brother      Cancer Brother 64        colon cancer     Anxiety Disorder Son      Depression Son      Mental Illness Sister         Schizophrenia     Hypertension Sister      Obesity Maternal Grandmother      Obesity Sister      Diabetes Nephew      Hypertension Brother          Current Outpatient Medications   Medication Sig Dispense Refill      amylase-lipase-protease (CREON) 42445-15311 units CPEP per EC capsule TAKE 4 CAPSULES WITH MEALS, 2 CAPSULES WITH SNACKS, UP TO 16 CAPSULES PER DAY 1440 capsule 3     metFORMIN (GLUCOPHAGE-XR) 500 MG 24 hr tablet Take 1 tablet (500 mg) by mouth 2 times daily (with meals) 180 tablet 3     acetaminophen (TYLENOL) 500 MG tablet Take 2 tablets (1,000 mg) by mouth every 8 hours 100 tablet 1     blood glucose (ONETOUCH VERIO IQ) test strip Use to test blood sugar 4 times daily or as directed. 400 each 3     clobetasol (TEMOVATE) 0.05 % GEL topical gel Apply topically 2 times daily 15 g 1     OneTouch Delica Lancets 33G MISC 4 lancets daily 150 each 3     No Known Allergies  Recent Labs   Lab Test 10/23/20  1028 10/23/20  1020 08/03/20  0938 04/13/20  0833 10/14/19  0908 10/14/19  0908 07/01/19  0845 07/01/19  0845 04/30/19  1340 10/05/18  0946 10/05/18  0946 05/30/18  0638 05/30/18  0638   A1C  --  6.2*  --   --   --  6.7*  --   --   --   --   --   --  7.8*   LDL 83  --   --   --   --  77  --   --   --   --  108*  --   --    HDL 87  --   --   --   --  78  --   --   --   --  56  --   --    TRIG 104  --   --   --   --  80  --   --   --   --  107  --   --    ALT 23  --   --  25  --   --   --  20  --    < >  --    < > 26   CR 0.64  --   --  0.62  --   --   --  0.57  --    < >  --    < > 0.63   GFRESTIMATED >90  --  >90 >90   < >  --    < > >90  --    < >  --    < > >90   GFRESTBLACK >90  --  >90 >90   < >  --    < > >90  --    < >  --    < > >90   POTASSIUM 4.0  --   --  4.2  --   --   --  4.3  --    < >  --    < > 4.3   TSH  --   --   --   --   --   --   --   --  1.48  --   --   --   --     < > = values in this interval not displayed.        Mammogram Screening: Patient over age 50, mutual decision to screen reflected in health maintenance.    Pertinent mammograms are reviewed under the imaging tab.  History of abnormal Pap smear: NO - age 30- 65 PAP every 3 years recommended  PAP / HPV Latest Ref Rng & Units 10/5/2018   PAP  "- NIL   HPV 16 DNA NEG:Negative Negative   HPV 18 DNA NEG:Negative Negative   OTHER HR HPV NEG:Negative Negative     Reviewed and updated as needed this visit by clinical staff  Tobacco  Allergies  Meds  Problems             Reviewed and updated as needed this visit by Provider   Allergies  Meds  Problems            Past Medical History:   Diagnosis Date     Arthritis Post chemo    Suspected     Diabetes (H)      Necrotizing pancreatitis      Pancreatic cancer (H)      Pneumonia     2016     PONV (postoperative nausea and vomiting)       OB History   No obstetric history on file.       Review of Systems  CONSTITUTIONAL: NEGATIVE for fever, chills, change in weight  INTEGUMENTARY/SKIN: NEGATIVE for worrisome rashes, moles or lesions  EYES: NEGATIVE for vision changes or irritation  ENT: NEGATIVE for ear, mouth and throat problems  RESP: NEGATIVE for significant cough or SOB  BREAST: NEGATIVE for masses, tenderness or discharge  CV: NEGATIVE for chest pain, palpitations or peripheral edema  GI: NEGATIVE for nausea, abdominal pain, heartburn, or change in bowel habits  : NEGATIVE for unusual urinary or vaginal symptoms. No vaginal bleeding.  MUSCULOSKELETAL: NEGATIVE for significant arthralgias or myalgia  NEURO: NEGATIVE for weakness, dizziness or paresthesias  PSYCHIATRIC: NEGATIVE for changes in mood or affect      OBJECTIVE:   /78 (BP Location: Left arm)   Pulse 67   Temp 97.8  F (36.6  C) (Tympanic)   Resp 12   Ht 1.676 m (5' 6\")   Wt 61.5 kg (135 lb 9.6 oz)   SpO2 95%   BMI 21.89 kg/m    Physical Exam  GENERAL APPEARANCE: healthy, alert and no distress  EYES: Eyes grossly normal to inspection, PERRL and conjunctivae and sclerae normal  HENT: ear canals and TM's normal, nose and mouth without ulcers or lesions, oropharynx clear and oral mucous membranes moist  NECK: no adenopathy, no asymmetry, masses, or scars and thyroid normal to palpation  RESP: lungs clear to auscultation - no rales, " rhonchi or wheezes  BREAST: normal without masses, tenderness or nipple discharge and no palpable axillary masses or adenopathy  CV: regular rate and rhythm, normal S1 S2, no S3 or S4, no murmur, click or rub, no peripheral edema and peripheral pulses strong  ABDOMEN: soft, nontender, no hepatosplenomegaly, no masses and bowel sounds normal  MS: no musculoskeletal defects are noted and gait is age appropriate without ataxia  SKIN: no suspicious lesions or rashes  PSYCH: mentation appears normal and affect normal/bright    Diagnostic Test Results:  Labs reviewed in Epic  Results for orders placed or performed in visit on 10/23/20 (from the past 24 hour(s))   **A1C FUTURE anytime   Result Value Ref Range    Hemoglobin A1C 6.2 (H) 0 - 5.6 %   Lipid panel reflex to direct LDL Fasting   Result Value Ref Range    Cholesterol 191 <200 mg/dL    Triglycerides 104 <150 mg/dL    HDL Cholesterol 87 >49 mg/dL    LDL Cholesterol Calculated 83 <100 mg/dL    Non HDL Cholesterol 104 <130 mg/dL   Comprehensive metabolic panel (BMP + Alb, Alk Phos, ALT, AST, Total. Bili, TP)   Result Value Ref Range    Sodium 136 133 - 144 mmol/L    Potassium 4.0 3.4 - 5.3 mmol/L    Chloride 101 94 - 109 mmol/L    Carbon Dioxide 31 20 - 32 mmol/L    Anion Gap 4 3 - 14 mmol/L    Glucose 98 70 - 99 mg/dL    Urea Nitrogen 15 7 - 30 mg/dL    Creatinine 0.64 0.52 - 1.04 mg/dL    GFR Estimate >90 >60 mL/min/[1.73_m2]    GFR Estimate If Black >90 >60 mL/min/[1.73_m2]    Calcium 9.5 8.5 - 10.1 mg/dL    Bilirubin Total 0.6 0.2 - 1.3 mg/dL    Albumin 4.2 3.4 - 5.0 g/dL    Protein Total 8.4 6.8 - 8.8 g/dL    Alkaline Phosphatase 85 40 - 150 U/L    ALT 23 0 - 50 U/L    AST 17 0 - 45 U/L       ASSESSMENT/PLAN:   1. Encounter for Medicare annual wellness exam  59 yr old female here for her annual physical. She is doing well.     2. Type 2 diabetes mellitus without complication, without long-term current use of insulin (H)  A1C was within normal limits. Continue  "with current medication.   - **A1C FUTURE anytime  - Lipid panel reflex to direct LDL Fasting  - JUST IN CASE  - Comprehensive metabolic panel (BMP + Alb, Alk Phos, ALT, AST, Total. Bili, TP)  - metFORMIN (GLUCOPHAGE-XR) 500 MG 24 hr tablet; Take 1 tablet (500 mg) by mouth 2 times daily (with meals)  Dispense: 180 tablet; Refill: 3  - OFFICE/OUTPT VISIT,EST,LEVL III    3. Need for prophylactic vaccination and inoculation against influenza  - INFLUENZA QUAD, RECOMBINANT, P-FREE (RIV4) (FLUBLOCK) [93185]  - Vaccine Administration, Initial [84747]    4. Necrotizing pancreatitis  Medication refilled.  - amylase-lipase-protease (CREON) 74332-27118 units CPEP per EC capsule; TAKE 4 CAPSULES WITH MEALS, 2 CAPSULES WITH SNACKS, UP TO 16 CAPSULES PER DAY  Dispense: 1440 capsule; Refill: 3  - OFFICE/OUTPT VISIT,EST,LEVL III    Patient has been advised of split billing requirements and indicates understanding: Yes  COUNSELING:  Reviewed preventive health counseling, as reflected in patient instructions       Regular exercise       Healthy diet/nutrition       Vision screening    Estimated body mass index is 21.89 kg/m  as calculated from the following:    Height as of this encounter: 1.676 m (5' 6\").    Weight as of this encounter: 61.5 kg (135 lb 9.6 oz).        She reports that she quit smoking about 39 years ago. Her smoking use included cigarettes. She started smoking about 46 years ago. She has a 2.50 pack-year smoking history. She has never used smokeless tobacco.      Counseling Resources:  ATP IV Guidelines  Pooled Cohorts Equation Calculator  Breast Cancer Risk Calculator  BRCA-Related Cancer Risk Assessment: FHS-7 Tool  FRAX Risk Assessment  ICSI Preventive Guidelines  Dietary Guidelines for Americans, 2010  USDA's MyPlate  ASA Prophylaxis  Lung CA Screening    Solange Mcgill MD  [facility]    "

## 2020-11-26 ENCOUNTER — MYC REFILL (OUTPATIENT)
Dept: OTOLARYNGOLOGY | Facility: CLINIC | Age: 60
End: 2020-11-26

## 2020-11-26 DIAGNOSIS — K12.30 STOMATITIS AND MUCOSITIS: ICD-10-CM

## 2020-11-26 DIAGNOSIS — K12.1 STOMATITIS AND MUCOSITIS: ICD-10-CM

## 2020-12-01 RX ORDER — CLOBETASOL PROPIONATE 0.05 MG/G
GEL TOPICAL 2 TIMES DAILY
Qty: 15 G | Refills: 1 | Status: SHIPPED | OUTPATIENT
Start: 2020-12-01

## 2020-12-08 ENCOUNTER — HOSPITAL ENCOUNTER (OUTPATIENT)
Dept: CT IMAGING | Facility: CLINIC | Age: 60
Discharge: HOME | End: 2020-12-08
Attending: INTERNAL MEDICINE | Admitting: INTERNAL MEDICINE
Payer: COMMERCIAL

## 2020-12-08 DIAGNOSIS — C25.9 MALIGNANT NEOPLASM OF PANCREAS, UNSPECIFIED LOCATION OF MALIGNANCY (H): ICD-10-CM

## 2020-12-08 LAB
ALBUMIN SERPL-MCNC: 3.9 G/DL (ref 3.4–5)
ALP SERPL-CCNC: 81 U/L (ref 40–150)
ALT SERPL W P-5'-P-CCNC: 17 U/L (ref 0–50)
ANION GAP SERPL CALCULATED.3IONS-SCNC: 2 MMOL/L (ref 3–14)
AST SERPL W P-5'-P-CCNC: 19 U/L (ref 0–45)
BASOPHILS # BLD AUTO: 0 10E9/L (ref 0–0.2)
BASOPHILS NFR BLD AUTO: 0.4 %
BILIRUB SERPL-MCNC: 0.6 MG/DL (ref 0.2–1.3)
BUN SERPL-MCNC: 13 MG/DL (ref 7–30)
CALCIUM SERPL-MCNC: 9.4 MG/DL (ref 8.5–10.1)
CHLORIDE SERPL-SCNC: 101 MMOL/L (ref 94–109)
CO2 SERPL-SCNC: 32 MMOL/L (ref 20–32)
CREAT SERPL-MCNC: 0.68 MG/DL (ref 0.52–1.04)
DIFFERENTIAL METHOD BLD: NORMAL
EOSINOPHIL # BLD AUTO: 0.2 10E9/L (ref 0–0.7)
EOSINOPHIL NFR BLD AUTO: 1.7 %
ERYTHROCYTE [DISTWIDTH] IN BLOOD BY AUTOMATED COUNT: 13 % (ref 10–15)
GFR SERPL CREATININE-BSD FRML MDRD: >90 ML/MIN/{1.73_M2}
GLUCOSE SERPL-MCNC: 121 MG/DL (ref 70–99)
HCT VFR BLD AUTO: 40.4 % (ref 35–47)
HGB BLD-MCNC: 13.6 G/DL (ref 11.7–15.7)
LYMPHOCYTES # BLD AUTO: 4.3 10E9/L (ref 0.8–5.3)
LYMPHOCYTES NFR BLD AUTO: 47.8 %
MCH RBC QN AUTO: 32 PG (ref 26.5–33)
MCHC RBC AUTO-ENTMCNC: 33.7 G/DL (ref 31.5–36.5)
MCV RBC AUTO: 95 FL (ref 78–100)
MONOCYTES # BLD AUTO: 1.2 10E9/L (ref 0–1.3)
MONOCYTES NFR BLD AUTO: 13 %
NEUTROPHILS # BLD AUTO: 3.3 10E9/L (ref 1.6–8.3)
NEUTROPHILS NFR BLD AUTO: 37.1 %
PLATELET # BLD AUTO: 387 10E9/L (ref 150–450)
POTASSIUM SERPL-SCNC: 4 MMOL/L (ref 3.4–5.3)
PROT SERPL-MCNC: 7.6 G/DL (ref 6.8–8.8)
RBC # BLD AUTO: 4.25 10E12/L (ref 3.8–5.2)
SODIUM SERPL-SCNC: 135 MMOL/L (ref 133–144)
WBC # BLD AUTO: 8.9 10E9/L (ref 4–11)

## 2020-12-08 PROCEDURE — 80053 COMPREHEN METABOLIC PANEL: CPT | Performed by: INTERNAL MEDICINE

## 2020-12-08 PROCEDURE — 250N000009 HC RX 250: Performed by: INTERNAL MEDICINE

## 2020-12-08 PROCEDURE — 250N000011 HC RX IP 250 OP 636: Performed by: INTERNAL MEDICINE

## 2020-12-08 PROCEDURE — 71260 CT THORAX DX C+: CPT

## 2020-12-08 PROCEDURE — 85025 COMPLETE CBC W/AUTO DIFF WBC: CPT | Performed by: INTERNAL MEDICINE

## 2020-12-08 PROCEDURE — 36415 COLL VENOUS BLD VENIPUNCTURE: CPT | Performed by: INTERNAL MEDICINE

## 2020-12-08 PROCEDURE — 99000 SPECIMEN HANDLING OFFICE-LAB: CPT | Performed by: INTERNAL MEDICINE

## 2020-12-08 PROCEDURE — 86301 IMMUNOASSAY TUMOR CA 19-9: CPT | Mod: 90 | Performed by: INTERNAL MEDICINE

## 2020-12-08 RX ORDER — IOPAMIDOL 755 MG/ML
66 INJECTION, SOLUTION INTRAVASCULAR ONCE
Status: COMPLETED | OUTPATIENT
Start: 2020-12-08 | End: 2020-12-08

## 2020-12-08 RX ADMIN — IOPAMIDOL 66 ML: 755 INJECTION, SOLUTION INTRAVENOUS at 09:34

## 2020-12-08 RX ADMIN — SODIUM CHLORIDE 56 ML: 9 INJECTION, SOLUTION INTRAVENOUS at 09:34

## 2020-12-09 LAB — CANCER AG19-9 SERPL-ACNC: 3 U/ML (ref 0–37)

## 2020-12-10 NOTE — PROGRESS NOTES
"Kitty Bangura is a 59 year old female who is being evaluated via a billable video visit.      The patient has been notified of following:     \"This video visit will be conducted via a call between you and your physician/provider. We have found that certain health care needs can be provided without the need for an in-person physical exam.  This service lets us provide the care you need with a video conversation.  If a prescription is necessary we can send it directly to your pharmacy.  If lab work is needed we can place an order for that and you can then stop by our lab to have the test done at a later time.    Video visits are billed at different rates depending on your insurance coverage.  Please reach out to your insurance provider with any questions.    If during the course of the call the physician/provider feels a video visit is not appropriate, you will not be charged for this service.\"    Patient has given verbal consent for Video visit? Yes    How would you like to obtain your AVS? MyChart     If you are dropped from the video visit, the video invite should be resent to: Send to e-mail at: [email or web address]     Will anyone else be joining your video visit?  No            I have reviewed and updated the patient's allergies and medication list. Patient was asked to provide any patient recorded vital signs, height and/or weight.  Please see \"Patient Reported Vital Signs\" tab for that information.        Concerns: Patient has no new concerns.      Refills: None         Chapo Joaquin EMT      Vitals - Patient Reported  Weight (Patient Reported): 60.8 kg (134 lb)  Height (Patient Reported): 165.1 cm (5' 5\")  BMI (Based on Pt Reported Ht/Wt): 22.3  Pain Score: Moderate Pain (4)  Pain Loc: Abdomen            Oncology Follow-up Visit:  Dec 11, 2020    Cancer diagnosis: cystic pancreatic tail adenocarcinoma  small subcentimeter pulmonary nodules seen on staging scans of unclear etiology.     Treatment to " date: neoadjuvant FOLFIRINOX x4 cycles, 1/15/18-3/6/18  Difficulty tolerating neoadjuvant intent chemotherapy. Distal pancreatectomy performed April 17, 2018, no residual carcinoma seen on operative pathology.  Treated with four cycles of adjuvant chemotherapy with gemcitabine and Xeloda, completed September 2018.     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     Referring physician: Dr. González Metz MD    Oncology History: She was previously healthy until she presented in early November 2017 with abdominal pain. CT abdomen on 11/1/17 showed acute pancreatitis (lipase 41,960) and a 3cm heterogenous pancreatic tail mass. The etiology of pancreatitis is unclear - no obstructing gallstone and not a heavy drinker. She was hospitalized for one week and followed up with Dr. González Metz in GI clinic.     11/29/17 -- endoscopic drainage of walled-off area of pancreatic necrosis by Dr. Metz. During the same procedure she had an EUS FNA of the pancreatic tail mass and pathology showed adenocarcinoma. The mass measured 33 x 27 mm, encapsulated with solid and cystic components, rim calcification, and no evidence of invasion.     12/9/17 -- Staging CT chest/abd/pelvis showed several small pulmonary nodules (largest 3mm), unchanged mass in the pancreatic tail, mildly prominent mesenteric nodes thought likely reactive, and small right hepatic lobe hypodensities. Abdominal MRI on 12/10/17 showed hepatic hemangiomas, no evidence of metastatic disease to the liver.     1/8/18 - - oncology consultation, Dr. Monk. Recommendation: neoadjuvant intent chemotherapy with FOLFIRINOX.    1/15/18 - - cycle 1/day one FOLFIRINOX chemotherapy.    1/20 through 1/27/18 - - hospitalization at the [facility], for acute pancreatitis. Following three days of  nausea, vomiting, pain. During this hospitalization: ERCP was attempted by Dr. Sanches with pancreatic stent placement, but pancreatic duct was completely obstructed and wire was unable to pass beyond the duct. Dr. Metz performed successful US guided cyst gastrostomy January 25, 2018.    1/31/18 - - oncology follow-up, DANTE Talavera. Neutropenic with ANC 0.4, thus defer cycle two of chemotherapy.    2/5/18 - - oncology follow-up, DANTE Junior.  Cycle 2/day one FOLFIRINOX chemotherapy. Patient endorses cold sensitivity secondary to oxaliplatin. Neulasta given for growth factor support. Due to significant neutropenia with cycle one.    2/11/18 - - ER evaluation for epigastric pain. Discharged to home from ER. Leukocytosis secondary to Neulasta given on February 8.    2/20/18 - - oncology follow-up, DANTE Junior. Cycle 3/day one FOLFIRINOX given without Neulasta. At patient request, oxaliplatin dose reduced by 10% due to neuropathy/cold sensitivity.    3/6/18 - - oncology follow-up, DANTE Talavera. Cycle 4/day one FOLFIRINOX chemotherapy.    3/13/18 - - CT chest/abdomen/pelvis - - IMPRESSION: 1. Minimal decrease in size and marked decrease in enhancement and pancreatic mass since the comparison study. 2. No significant change in low dense lesions in the liver since comparison.    3/16/18 - - oncology follow-up Dr. Monk. Plan is to hold on further chemotherapy as surgery is scheduled for 4/17/18 4/17/18 - - surgery: PROCEDURE: Diagnostic laparoscopy, splenic flexure mobilization, intraoperative ultrasound, distal pancreatectomy, splenectomy, and cholecystectomy. SURGEON: Ja Byrd MD  Final surgical pathology showed necrotizing pancreatitis, no evidence of residual cancer, complete pathologic response, 26 benign lymph nodes.  FINAL DIAGNOSIS: A. DISTAL PANCREAS, SPLEEN AND OMENTUM, RESECTION: - Necrotizing pancreatitis with residual acellular mucin and foci of high grade  dysplasia, status post neoadjuvant therapy   - Treatment response: complete (score 0)   - Twenty-six benign lymph nodes (0/26)   - Negative for residual carcinoma   - Pathologic staging: [id number]   - Focal infarction, metaplastic bone formation - Surgical margins are free of neoplasia - Unremarkable spleen and omentum   B. GALLBLADDER, CHOLECYSTECTOMY: - Benign gallbladder, biliary mucosa with no significant histologic abnormality - Negative for dysplasia COMMENT: Histologic sections demonstrate pools of acellular mucin, necrosis and scattered high grade dysplasia (PanIN-3) with no evidence of residual invasive carcinoma    4/30/18 - - surgical oncology follow-up, Dr. Byrd.  5/4/18 - - palliative care follow-up Dr. Snow. No specific recommendations required at this time.  5/14/18 - - oncology follow-up, Dr. Monk. Plan: adjuvant chemotherapy with gemcitabine and Xeloda.  5/30/18 - - cycle 1/day one adjuvant chemotherapy with gemcitabine and Xeloda.  Cycle three was dose reduced due to mucositis secondary to Xeloda.  8/28/18 - - oncology follow-up, DANTE Talavera. Cycle 4/day one gemcitabine and Xeloda.  9/17/18 - - CT chest/abdomen/pelvis - -IMPRESSION:  Resolving postoperative changes. No new findings or evidence of metastatic disease.  9/24/18 - - oncology follow-up, Dr. Monk. PLAN: initiate active surveillance.  12/21/18--CT c/a/p--IMPRESSION:  1. Stable tiny 2 to 3 mm lung nodules as described above. This  suggests a benign etiology.  2. Two low density liver lesions. These appear to vary in appearance  depending on slight variations in timing of the bolus of contrast.  Likely no significant change. Recommend continued close surveillance.  3. No evidence of recurrent mass in the abdomen or pelvis. No  adenopathy.  12/28/18--scheduled oncology follow-up, Dr Monk. PATIENT NO-SHOW.  12/31/18--oncology follow-up, Dr Monk. Plan: Continue surveillance.  Increase Creon dose due to fatty food intolerance/weight  loss.    3/29/19--CT c/a/p-- IMPRESSION: Stable scan compared to 12/21/2018 with the following  findings:  1. Several bilateral small pulmonary nodules, unchanged in appearance.  2. Two right hepatic small hypodense lesions, possibly hemangiomas.  These are also stable in appearance.  3. No apparent recurrent mass.  4/5/19 - - oncology follow-up, Dr. Monk.     7/1/19--CT c/a/p--IMPRESSION:  Stable examination with no convincing metastatic or  recurrent neoplasm.  7/8/19--oncology follow-up, Dr. Monk.    9/30/19 - - CT chest/abdomen/pelvis - - IMPRESSION:  1.  There is a small grouped area of nodularity in the right middle  lobe. Grouped nature favors an inflammatory etiology.  2.  The abdomen CT is stable with no convincing metastatic or  recurrent neoplasm.  10/7/19 - - oncology follow-up, Dr. Monk.    12/13/19 - - CT chest/abdomen/pelvis - - IMPRESSION:  1.  Stable abdomen/pelvis CT with no convincing metastatic or  recurrent neoplasm.  2. New/additional small focus of grouped nodularity in the right  middle lobe. Inflammatory etiology is still favored.  12/20/19 - - oncology follow-up, Dr. Monk.    4/13/20--CT c/a/p--IMPRESSION:  Interval resolution of suspected inflammatory change in right middle lobe. Otherwise, stable examination.       4/20/20--oncology follow-up, Dr. Monk.      8/3/20--CT c/a/p--IMPRESSION:  Stable examination with no convincing metastatic or  recurrent neoplasm.    August 10, 2020 -- oncology follow-up/virtual visit, Dr. Monk.  12/8/20--CT c/a/p--IMPRESSION:  1.  Stable appearance of distal pancreatectomy and splenectomy.  2.  Stable tiny nodules through both lungs.  3.  New area of likely atelectasis in the posterior left apex  measuring 6 mm. Follow-up would be recommended in six months or to  correspond with the next routine oncologic follow-up.  4.  Two stable small ill-defined lesions in the right hepatic lobe.    December 11, 2020 -- oncology follow-up/virtual visit,   Aleshia.          Interim History:  Ms. Bangura joined me from home in the setting of the ongoing coronavirus stay-at-home orders.  This is a video visit via the [employer]-based platform.  I am in my academic office during this encounter.  She is alone during the course of this discussion.        She is generally doing well.  She does not have any symptomatic issues aside from some mild pulmonary dysfunction.  She states over the last 6-8 weeks, she developed some shortness of breath, particularly upon exertion when walking uphill or walking outdoors with her .  She states she had a remote history of pneumonia about 4 years ago preceding her cancer diagnosis.        She is now more than 2+ years from her Whipple surgery and a recent CT scan does not show any evidence of recurrent malignancy.  There were no significant pulmonary masses or nodules that might indicate metastatic adenocarcinoma.  She has some stable tiny lung nodules as well as a couple small cyst-like lesions versus nodules in the right hepatic lobe.  Otherwise, there was some mild atelectasis particularly in the left lung apex, but nothing showing any infiltrate that would explain her symptoms.             Results for EZEQUIEL BANGURA (MRN [id number]) as of 12/10/2020 11:31   Ref. Range 8/3/2020 09:55 2020 08:42   Cancer Antigen 19-9 Latest Ref Range: 0 - 37 U/mL 3 3      Review Of Systems:  Comprehensive 14-point ROS reviewed. Pertinent symptoms reviewed above per HPI.    PAST MEDICAL HISTORY:   Pre-diabetes  Pancreatitis as above     PAST SURGICAL HISTORY:  Laparotomy x2 for ruptured tubal pregnancies    Discectomy for herniated lumbar disk  Breast reduction surgery     FAMILY HISTORY:  Colon cancer in maternal aunt  Paternal aunt and cousins with breast cancer  CAD in father and brother     SH: , from [place], with 30+ yr-old son. Works as . former smoker, quit 30 years ago. two glasses of wine per night, none  "since pancreatitis diagnosis     Allergies: none      Current Outpatient Medications:      acetaminophen (TYLENOL) 500 MG tablet, Take 2 tablets (1,000 mg) by mouth every 8 hours, Disp: 100 tablet, Rfl: 1     amylase-lipase-protease (CREON) 75149-30516 units CPEP per EC capsule, TAKE 4 CAPSULES WITH MEALS, 2 CAPSULES WITH SNACKS, UP TO 16 CAPSULES PER DAY, Disp: 1440 capsule, Rfl: 3     blood glucose (ONETOUCH VERIO IQ) test strip, Use to test blood sugar 4 times daily or as directed., Disp: 400 each, Rfl: 3     clobetasol (TEMOVATE) 0.05 % GEL topical gel, Apply topically 2 times daily, Disp: 15 g, Rfl: 1     metFORMIN (GLUCOPHAGE-XR) 500 MG 24 hr tablet, Take 1 tablet (500 mg) by mouth 2 times daily (with meals), Disp: 180 tablet, Rfl: 3     OneTouch Delica Lancets 33G MISC, 4 lancets daily, Disp: 150 each, Rfl: 3  No current facility-administered medications for this visit.     Facility-Administered Medications Ordered in Other Visits:      heparin 100 UNIT/ML injection 5 mL, 5 mL, Intracatheter, Once, Art Guevara MD      Physical Exam:  Physical exam could not be performed today in context of a Virtual Visit during the COVID19/Coronavirus pandemic.  Vitals - Patient Reported  Weight (Patient Reported): 60.8 kg (134 lb)  Height (Patient Reported): 165.1 cm (5' 5\")  BMI (Based on Pt Reported Ht/Wt): 22.3  Pain Score: Moderate Pain (4)  Pain Loc: Abdomen         Observed physical assessments made today by visualizing the patient by video link:    General/Constitutional: Generally appears well, not acutely ill.  HEENT: no scleral icterus, not jaundiced.  Respiratory: no labored breathing.  Musculoskeletal: appears to have full range of motion and adequate physical strength.  Skin: no jaundice, discoloration or other notable lesions.  Neurological: no evidence of tremors.  Psychiatric: no evident anxiety; fully alert and oriented with fluent speech.      The rest of a comprehensive physical examination is " deferred due to PHE (public health emergency) video visit restrictions.    Laboratory/Imaging Studies  I released the pertinent recent imaging results to [facility] in advance of this visit, and reviewed the summary verbatim and in lay language during today's call.  Results for EZEQUIEL BANGURA (MRN [id number]) as of 12/10/2020 11:31   Ref. Range 8/3/2020 09:55 12/8/2020 08:42   Cancer Antigen 19-9 Latest Ref Range: 0 - 37 U/mL 3 3         ASSESSMENT/PLAN:  Mrs. Bangura is a 60 year old woman with resected pancreatic tail adenocarcinoma.  She had initial extensive and severe necrotizing pancreatitis upon initial diagnosis and hospitalization that delayed treatment.  We ultimately were able to begin neoadjuvant FOLFIRINOX for 4 cycles beginning in January.  By the time she had pancreatectomy by Dr. Byrd in mid April there was no evidence of residual carcinoma seen on operative pathology.  She completed subsequently 4 months of gemcitabine and Xeloda in the adjuvant setting.       At this point, she is more than 2-1/2 years out from her surgery.  Again, the pathology showed no residual carcinoma.  There is no evidence of recurrent disease.  I reviewed the scan with her in full and released it to [facility] in advance of the visit.  I suggested a 4- to 5-month followup with another CT chest, abdomen and pelvis, CBC, CMP and CA 19-9 check on blood draw during that time.  I advised her to call us if she develops any concerning symptoms, such as but not limited to, jaundice, abdominal pain or other concerning symptoms, in which case we could do further evaluation sooner.        I reviewed with her the fact that the lung nodules and hepatic nodules are rather tiny and stable and likely meet radiologic criteria for calling the lung nodules benign.  I reviewed atelectasis and that is a nonmalignant condition and advised her to follow up with her primary care physician regarding some of her concerns about shortness of breath.   She is not acutely short of breath, rather on significant exertion.  This may be addressed by cold or allergies or other issues, but I will defer to her primary care physician to evaluate this further.             VIRTUAL VISIT - DETAILS:    I have reviewed the note as documented above. This accurately captures the substance of my conversation with the patient.    Date of call: December 10, 2020   Start of call:  8:47 am  End of call:  8:56 am    Provider location: [facility] (academic office)  Patient location: Home    Mode of Video Visit: kathy         I spent 20 minutes prior to the visit reviewing updated information regarding this case, including notes, imaging, labs, and other pertinent results.      Jovany Monk MD PhD

## 2020-12-11 ENCOUNTER — VIRTUAL VISIT (OUTPATIENT)
Dept: ONCOLOGY | Facility: CLINIC | Age: 60
End: 2020-12-11
Attending: INTERNAL MEDICINE
Payer: COMMERCIAL

## 2020-12-11 DIAGNOSIS — C25.8 OVERLAPPING MALIGNANT NEOPLASM OF PANCREAS (H): Primary | ICD-10-CM

## 2020-12-11 PROCEDURE — 99213 OFFICE O/P EST LOW 20 MIN: CPT | Mod: 95 | Performed by: INTERNAL MEDICINE

## 2020-12-11 PROCEDURE — 999N001193 HC VIDEO/TELEPHONE VISIT; NO CHARGE

## 2020-12-11 NOTE — LETTER
"    12/11/2020         RE: Kitty Bangura  [id number] [facility] [id number]        Dear Colleague,    Thank you for referring your patient, Kitty Bangura, to the [facility]. Please see a copy of my visit note below.    Kitty Bangura is a 59 year old female who is being evaluated via a billable video visit.      The patient has been notified of following:     \"This video visit will be conducted via a call between you and your physician/provider. We have found that certain health care needs can be provided without the need for an in-person physical exam.  This service lets us provide the care you need with a video conversation.  If a prescription is necessary we can send it directly to your pharmacy.  If lab work is needed we can place an order for that and you can then stop by our lab to have the test done at a later time.    Video visits are billed at different rates depending on your insurance coverage.  Please reach out to your insurance provider with any questions.    If during the course of the call the physician/provider feels a video visit is not appropriate, you will not be charged for this service.\"    Patient has given verbal consent for Video visit? Yes    How would you like to obtain your AVS? MyChart     If you are dropped from the video visit, the video invite should be resent to: Send to e-mail at: [email or web address]     Will anyone else be joining your video visit?  No            I have reviewed and updated the patient's allergies and medication list. Patient was asked to provide any patient recorded vital signs, height and/or weight.  Please see \"Patient Reported Vital Signs\" tab for that information.        Concerns: Patient has no new concerns.      Refills: None         KRYSTYNA Anne      Vitals - Patient Reported  Weight (Patient Reported): 60.8 kg (134 lb)  Height (Patient Reported): 165.1 cm (5' 5\")  BMI (Based on Pt Reported Ht/Wt): " 22.3  Pain Score: Moderate Pain (4)  Pain Loc: Abdomen            Oncology Follow-up Visit:  Dec 11, 2020    Cancer diagnosis: cystic pancreatic tail adenocarcinoma  small subcentimeter pulmonary nodules seen on staging scans of unclear etiology.     Treatment to date: neoadjuvant FOLFIRINOX x4 cycles, 1/15/18-3/6/18  Difficulty tolerating neoadjuvant intent chemotherapy. Distal pancreatectomy performed April 17, 2018, no residual carcinoma seen on operative pathology.  Treated with four cycles of adjuvant chemotherapy with gemcitabine and Xeloda, completed September 2018.     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     Referring physician: Dr. González Metz MD    Oncology History: She was previously healthy until she presented in early November 2017 with abdominal pain. CT abdomen on 11/1/17 showed acute pancreatitis (lipase 41,960) and a 3cm heterogenous pancreatic tail mass. The etiology of pancreatitis is unclear - no obstructing gallstone and not a heavy drinker. She was hospitalized for one week and followed up with Dr. González Metz in GI clinic.     11/29/17 -- endoscopic drainage of walled-off area of pancreatic necrosis by Dr. Metz. During the same procedure she had an EUS FNA of the pancreatic tail mass and pathology showed adenocarcinoma. The mass measured 33 x 27 mm, encapsulated with solid and cystic components, rim calcification, and no evidence of invasion.     12/9/17 -- Staging CT chest/abd/pelvis showed several small pulmonary nodules (largest 3mm), unchanged mass in the pancreatic tail, mildly prominent mesenteric nodes thought likely reactive, and small right hepatic lobe hypodensities. Abdominal MRI on 12/10/17 showed hepatic hemangiomas, no evidence of metastatic disease to the liver.     1/8/18 - - oncology  consultation, Dr. Monk. Recommendation: neoadjuvant intent chemotherapy with FOLFIRINOX.    1/15/18 - - cycle 1/day one FOLFIRINOX chemotherapy.    1/20 through 1/27/18 - - hospitalization at the [facility], for acute pancreatitis. Following three days of nausea, vomiting, pain. During this hospitalization: ERCP was attempted by Dr. Sanches with pancreatic stent placement, but pancreatic duct was completely obstructed and wire was unable to pass beyond the duct. Dr. Metz performed successful US guided cyst gastrostomy January 25, 2018.    1/31/18 - - oncology follow-up, DANTE Talavera. Neutropenic with ANC 0.4, thus defer cycle two of chemotherapy.    2/5/18 - - oncology follow-up, DANTE Junior.  Cycle 2/day one FOLFIRINOX chemotherapy. Patient endorses cold sensitivity secondary to oxaliplatin. Neulasta given for growth factor support. Due to significant neutropenia with cycle one.    2/11/18 - - ER evaluation for epigastric pain. Discharged to home from ER. Leukocytosis secondary to Neulasta given on February 8.    2/20/18 - - oncology follow-up, DANTE Junior. Cycle 3/day one FOLFIRINOX given without Neulasta. At patient request, oxaliplatin dose reduced by 10% due to neuropathy/cold sensitivity.    3/6/18 - - oncology follow-up, DANTE Talavera. Cycle 4/day one FOLFIRINOX chemotherapy.    3/13/18 - - CT chest/abdomen/pelvis - - IMPRESSION: 1. Minimal decrease in size and marked decrease in enhancement and pancreatic mass since the comparison study. 2. No significant change in low dense lesions in the liver since comparison.    3/16/18 - - oncology follow-up Dr. Monk. Plan is to hold on further chemotherapy as surgery is scheduled for 4/17/18 4/17/18 - - surgery: PROCEDURE: Diagnostic laparoscopy, splenic flexure mobilization, intraoperative ultrasound, distal pancreatectomy, splenectomy, and cholecystectomy. SURGEON: Ja Byrd MD  Final surgical pathology  showed necrotizing pancreatitis, no evidence of residual cancer, complete pathologic response, 26 benign lymph nodes.  FINAL DIAGNOSIS: A. DISTAL PANCREAS, SPLEEN AND OMENTUM, RESECTION: - Necrotizing pancreatitis with residual acellular mucin and foci of high grade dysplasia, status post neoadjuvant therapy   - Treatment response: complete (score 0)   - Twenty-six benign lymph nodes (0/26)   - Negative for residual carcinoma   - Pathologic staging: [id number]   - Focal infarction, metaplastic bone formation - Surgical margins are free of neoplasia - Unremarkable spleen and omentum   B. GALLBLADDER, CHOLECYSTECTOMY: - Benign gallbladder, biliary mucosa with no significant histologic abnormality - Negative for dysplasia COMMENT: Histologic sections demonstrate pools of acellular mucin, necrosis and scattered high grade dysplasia (PanIN-3) with no evidence of residual invasive carcinoma    4/30/18 - - surgical oncology follow-up, Dr. Byrd.  5/4/18 - - palliative care follow-up Dr. Snow. No specific recommendations required at this time.  5/14/18 - - oncology follow-up, Dr. Monk. Plan: adjuvant chemotherapy with gemcitabine and Xeloda.  5/30/18 - - cycle 1/day one adjuvant chemotherapy with gemcitabine and Xeloda.  Cycle three was dose reduced due to mucositis secondary to Xeloda.  8/28/18 - - oncology follow-up, DANTE Talavera. Cycle 4/day one gemcitabine and Xeloda.  9/17/18 - - CT chest/abdomen/pelvis - -IMPRESSION:  Resolving postoperative changes. No new findings or evidence of metastatic disease.  9/24/18 - - oncology follow-up, Dr. Monk. PLAN: initiate active surveillance.  12/21/18--CT c/a/p--IMPRESSION:  1. Stable tiny 2 to 3 mm lung nodules as described above. This  suggests a benign etiology.  2. Two low density liver lesions. These appear to vary in appearance  depending on slight variations in timing of the bolus of contrast.  Likely no significant change. Recommend continued close surveillance.  3.  No evidence of recurrent mass in the abdomen or pelvis. No  adenopathy.  12/28/18--scheduled oncology follow-up, Dr Monk. PATIENT NO-SHOW.  12/31/18--oncology follow-up, Dr Monk. Plan: Continue surveillance.  Increase Creon dose due to fatty food intolerance/weight loss.    3/29/19--CT c/a/p-- IMPRESSION: Stable scan compared to 12/21/2018 with the following  findings:  1. Several bilateral small pulmonary nodules, unchanged in appearance.  2. Two right hepatic small hypodense lesions, possibly hemangiomas.  These are also stable in appearance.  3. No apparent recurrent mass.  4/5/19 - - oncology follow-up, Dr. Monk.     7/1/19--CT c/a/p--IMPRESSION:  Stable examination with no convincing metastatic or  recurrent neoplasm.  7/8/19--oncology follow-up, Dr. Monk.    9/30/19 - - CT chest/abdomen/pelvis - - IMPRESSION:  1.  There is a small grouped area of nodularity in the right middle  lobe. Grouped nature favors an inflammatory etiology.  2.  The abdomen CT is stable with no convincing metastatic or  recurrent neoplasm.  10/7/19 - - oncology follow-up, Dr. Monk.    12/13/19 - - CT chest/abdomen/pelvis - - IMPRESSION:  1.  Stable abdomen/pelvis CT with no convincing metastatic or  recurrent neoplasm.  2. New/additional small focus of grouped nodularity in the right  middle lobe. Inflammatory etiology is still favored.  12/20/19 - - oncology follow-up, Dr. Monk.    4/13/20--CT c/a/p--IMPRESSION:  Interval resolution of suspected inflammatory change in right middle lobe. Otherwise, stable examination.       4/20/20--oncology follow-up, Dr. Monk.      8/3/20--CT c/a/p--IMPRESSION:  Stable examination with no convincing metastatic or  recurrent neoplasm.    August 10, 2020 -- oncology follow-up/virtual visit, Dr. Monk.  12/8/20--CT c/a/p--IMPRESSION:  1.  Stable appearance of distal pancreatectomy and splenectomy.  2.  Stable tiny nodules through both lungs.  3.  New area of likely atelectasis in the posterior left  apex  measuring 6 mm. Follow-up would be recommended in six months or to  correspond with the next routine oncologic follow-up.  4.  Two stable small ill-defined lesions in the right hepatic lobe.    2020 -- oncology follow-up/virtual visit, Dr. Monk.          Interim History:  Ms. Bangura joined me from home in the setting of the ongoing coronavirus stay-at-home orders.  This is a video visit via the [employer]-based platform.  I am in my academic office during this encounter.  She is alone during the course of this discussion.        She is generally doing well.  She does not have any symptomatic issues aside from some mild pulmonary dysfunction.  She states over the last 6-8 weeks, she developed some shortness of breath, particularly upon exertion when walking uphill or walking outdoors with her .  She states she had a remote history of pneumonia about 4 years ago preceding her cancer diagnosis.        She is now more than 2+ years from her Whipple surgery and a recent CT scan does not show any evidence of recurrent malignancy.  There were no significant pulmonary masses or nodules that might indicate metastatic adenocarcinoma.  She has some stable tiny lung nodules as well as a couple small cyst-like lesions versus nodules in the right hepatic lobe.  Otherwise, there was some mild atelectasis particularly in the left lung apex, but nothing showing any infiltrate that would explain her symptoms.             Results for EZEQUIEL BANGURA (MRN [id number]) as of 12/10/2020 11:31   Ref. Range 8/3/2020 09:55 2020 08:42   Cancer Antigen 19-9 Latest Ref Range: 0 - 37 U/mL 3 3      Review Of Systems:  Comprehensive 14-point ROS reviewed. Pertinent symptoms reviewed above per HPI.    PAST MEDICAL HISTORY:   Pre-diabetes  Pancreatitis as above     PAST SURGICAL HISTORY:  Laparotomy x2 for ruptured tubal pregnancies    Discectomy for herniated lumbar disk  Breast reduction surgery     FAMILY  "HISTORY:  Colon cancer in maternal aunt  Paternal aunt and cousins with breast cancer  CAD in father and brother     SH: , from [place], with 30+ yr-old son. Works as . former smoker, quit 30 years ago. two glasses of wine per night, none since pancreatitis diagnosis     Allergies: none      Current Outpatient Medications:      acetaminophen (TYLENOL) 500 MG tablet, Take 2 tablets (1,000 mg) by mouth every 8 hours, Disp: 100 tablet, Rfl: 1     amylase-lipase-protease (CREON) 56930-36123 units CPEP per EC capsule, TAKE 4 CAPSULES WITH MEALS, 2 CAPSULES WITH SNACKS, UP TO 16 CAPSULES PER DAY, Disp: 1440 capsule, Rfl: 3     blood glucose (ONETOUCH VERIO IQ) test strip, Use to test blood sugar 4 times daily or as directed., Disp: 400 each, Rfl: 3     clobetasol (TEMOVATE) 0.05 % GEL topical gel, Apply topically 2 times daily, Disp: 15 g, Rfl: 1     metFORMIN (GLUCOPHAGE-XR) 500 MG 24 hr tablet, Take 1 tablet (500 mg) by mouth 2 times daily (with meals), Disp: 180 tablet, Rfl: 3     OneTouch Delica Lancets 33G MISC, 4 lancets daily, Disp: 150 each, Rfl: 3  No current facility-administered medications for this visit.     Facility-Administered Medications Ordered in Other Visits:      heparin 100 UNIT/ML injection 5 mL, 5 mL, Intracatheter, Once, Art Guevara MD      Physical Exam:  Physical exam could not be performed today in context of a Virtual Visit during the COVID19/Coronavirus pandemic.  Vitals - Patient Reported  Weight (Patient Reported): 60.8 kg (134 lb)  Height (Patient Reported): 165.1 cm (5' 5\")  BMI (Based on Pt Reported Ht/Wt): 22.3  Pain Score: Moderate Pain (4)  Pain Loc: Abdomen         Observed physical assessments made today by visualizing the patient by video link:    General/Constitutional: Generally appears well, not acutely ill.  HEENT: no scleral icterus, not jaundiced.  Respiratory: no labored breathing.  Musculoskeletal: appears to have full range of motion and " adequate physical strength.  Skin: no jaundice, discoloration or other notable lesions.  Neurological: no evidence of tremors.  Psychiatric: no evident anxiety; fully alert and oriented with fluent speech.      The rest of a comprehensive physical examination is deferred due to PHE (public health emergency) video visit restrictions.    Laboratory/Imaging Studies  I released the pertinent recent imaging results to [facility] in advance of this visit, and reviewed the summary verbatim and in lay language during today's call.  Results for EZEQUIEL BANGURA (MRN [id number]) as of 12/10/2020 11:31   Ref. Range 8/3/2020 09:55 12/8/2020 08:42   Cancer Antigen 19-9 Latest Ref Range: 0 - 37 U/mL 3 3         ASSESSMENT/PLAN:  Mrs. Bangura is a 60 year old woman with resected pancreatic tail adenocarcinoma.  She had initial extensive and severe necrotizing pancreatitis upon initial diagnosis and hospitalization that delayed treatment.  We ultimately were able to begin neoadjuvant FOLFIRINOX for 4 cycles beginning in January.  By the time she had pancreatectomy by Dr. Byrd in mid April there was no evidence of residual carcinoma seen on operative pathology.  She completed subsequently 4 months of gemcitabine and Xeloda in the adjuvant setting.       At this point, she is more than 2-1/2 years out from her surgery.  Again, the pathology showed no residual carcinoma.  There is no evidence of recurrent disease.  I reviewed the scan with her in full and released it to [facility] in advance of the visit.  I suggested a 4- to 5-month followup with another CT chest, abdomen and pelvis, CBC, CMP and CA 19-9 check on blood draw during that time.  I advised her to call us if she develops any concerning symptoms, such as but not limited to, jaundice, abdominal pain or other concerning symptoms, in which case we could do further evaluation sooner.        I reviewed with her the fact that the lung nodules and hepatic nodules are rather tiny and  stable and likely meet radiologic criteria for calling the lung nodules benign.  I reviewed atelectasis and that is a nonmalignant condition and advised her to follow up with her primary care physician regarding some of her concerns about shortness of breath.  She is not acutely short of breath, rather on significant exertion.  This may be addressed by cold or allergies or other issues, but I will defer to her primary care physician to evaluate this further.             VIRTUAL VISIT - DETAILS:    I have reviewed the note as documented above. This accurately captures the substance of my conversation with the patient.    Date of call: December 10, 2020   Start of call:  8:47 am  End of call:  8:56 am    Provider location: [facility] (academic office)  Patient location: Home    Mode of Video Visit: Lynnette         I spent 20 minutes prior to the visit reviewing updated information regarding this case, including notes, imaging, labs, and other pertinent results.      Jovany Monk MD PhD

## 2021-01-18 ENCOUNTER — OFFICE VISIT (OUTPATIENT)
Dept: FAMILY MEDICINE | Facility: CLINIC | Age: 61
End: 2021-01-18
Payer: COMMERCIAL

## 2021-01-18 VITALS
DIASTOLIC BLOOD PRESSURE: 60 MMHG | BODY MASS INDEX: 22.18 KG/M2 | HEIGHT: 66 IN | SYSTOLIC BLOOD PRESSURE: 112 MMHG | WEIGHT: 138 LBS | TEMPERATURE: 96 F | HEART RATE: 63 BPM | OXYGEN SATURATION: 98 % | RESPIRATION RATE: 14 BRPM

## 2021-01-18 DIAGNOSIS — N95.2 ATROPHIC VAGINITIS: ICD-10-CM

## 2021-01-18 DIAGNOSIS — N89.8 VAGINAL ITCHING: Primary | ICD-10-CM

## 2021-01-18 LAB
SPECIMEN SOURCE: ABNORMAL
WET PREP SPEC: ABNORMAL

## 2021-01-18 PROCEDURE — 87210 SMEAR WET MOUNT SALINE/INK: CPT | Performed by: FAMILY MEDICINE

## 2021-01-18 PROCEDURE — 99213 OFFICE O/P EST LOW 20 MIN: CPT | Performed by: FAMILY MEDICINE

## 2021-01-18 RX ORDER — CLOBETASOL PROPIONATE 0.5 MG/G
OINTMENT TOPICAL 2 TIMES DAILY
Qty: 45 G | Refills: 1 | Status: SHIPPED | OUTPATIENT
Start: 2021-01-18 | End: 2022-12-01

## 2021-01-18 ASSESSMENT — MIFFLIN-ST. JEOR: SCORE: 1202.71

## 2021-01-18 NOTE — PROGRESS NOTES
SUBJECTIVE:                                                    Kitty Bangura is 60 year old female   Chief Complaint   Patient presents with     Vaginal Problem       Patient is a 60 yr old female here for vaginal itching and swelling. She reports that about a month ago. She reports intense itching and burning and pressure. She has also had some urinary incontinence. She denies any discharge. She has had no urinary symptoms.   Vaginal Symptoms- vaginal swelling and a mass or lump. Had an appointment with an OB/GYN but she had to cancel and was advised to see her Primary.  Duration of complaint: one month     Description:  Vaginal Discharge: none   Itching (Pruritis): YES  Burning sensation:  YES  Odor: no   Accompanying Signs & Symptoms:  Pain with Urination: no   Abdominal Pain: no   Fever: no   History:   Sexually active: no   New Partner: no   Possibility of Pregnancy:  No  Precipitating factors:   Recent Antibiotic Use: no   Alleviating factors: benadryl and anti itch cream  Therapies Tried and outcome: anti itch cream    Problem list and histories reviewed & adjusted, as indicated.  Additional history: as documented    Patient Active Problem List   Diagnosis     Acute pancreatitis     Leukocytosis     Fatty liver     Pancreatic mass     Necrotizing pancreatitis     Pancreatic adenocarcinoma (H)     Anemia     Thrombocytopenia (H)     Type 2 diabetes mellitus without complication, without long-term current use of insulin (H)     Drug-induced polyneuropathy (H)     Past Surgical History:   Procedure Laterality Date     ABDOMEN SURGERY  1983    Ruptured tubal pregnancies, additional surgeries followed     BACK SURGERY  2004    L5, S1 discectomy     BREAST SURGERY  2003    Breast reduction     COLONOSCOPY  2008     COLONOSCOPY N/A 12/11/2018    Procedure: colonoscopy;  Surgeon: Lobito Marte MD;  Location: [facility]     ENDOSCOPIC RETROGRADE CHOLANGIOPANCREATOGRAM N/A 1/25/2018    Procedure: COMBINED ENDOSCOPIC  RETROGRADE CHOLANGIOPANCREATOGRAPHY, PLACE TUBE/STENT;  Endoscopic retrograde cholangiopancreatogram with stent placement and cyst drainage bile ;  Surgeon: Vito Sanches MD;  [facility]     ENDOSCOPIC ULTRASOUND LOWER GASTROINTESTIONAL TRACT (GI) N/A 1/25/2018    Procedure: ENDOSCOPIC ULTRASOUND LOWER GASTROINTESTIONAL TRACT (GI);  Endoscopic Ultrasound with guided Cyst-Gastrostomy;  Surgeon: González Metz MD;  [facility]     ENDOSCOPIC ULTRASOUND, ESOPHAGOSCOPY, GASTROSCOPY, DUODENOSCOPY (EGD), NECROSECTOMY N/A 12/4/2017    Procedure: ENDOSCOPIC ULTRASOUND, ESOPHAGOSCOPY, GASTROSCOPY, DUODENOSCOPY (EGD), NECROSECTOMY;  Esophagogastroduodenoscopy, with  Necrosectomy and stents replacement  ;  Surgeon: González Metz MD;  [facility]     ENDOSCOPIC ULTRASOUND, ESOPHAGOSCOPY, GASTROSCOPY, DUODENOSCOPY (EGD), NECROSECTOMY N/A 12/12/2017    Procedure: ENDOSCOPIC ULTRASOUND, ESOPHAGOSCOPY, GASTROSCOPY, DUODENOSCOPY (EGD), NECROSECTOMY;  Esophagogastroduodenoscopy with Necrosectomy, Balloon Dilation, stent removal X3 and Cystgastrostomy stent Placement X2;  Surgeon: Guru Cecilia Staples MD;  [facility]     ESOPHAGOSCOPY, GASTROSCOPY, DUODENOSCOPY (EGD), COMBINED N/A 11/29/2017    Procedure: COMBINED ENDOSCOPIC ULTRASOUND, ESOPHAGOSCOPY, GASTROSCOPY, DUODENOSCOPY (EGD), FINE NEEDLE ASPIRATE/BIOPSY;  Endoscopic Ultrasound with cystgastrostomy and fine needle aspiration ;  Surgeon: González Metz MD;  [facility]     ESOPHAGOSCOPY, GASTROSCOPY, DUODENOSCOPY (EGD), COMBINED N/A 5/8/2018    Procedure: COMBINED ESOPHAGOSCOPY, GASTROSCOPY, DUODENOSCOPY (EGD);  EGD;  Surgeon: González Metz MD;  [facility]     ESOPHAGOSCOPY, GASTROSCOPY, DUODENOSCOPY (EGD), COMBINED N/A 5/8/2018    Procedure: COMBINED ESOPHAGOSCOPY, GASTROSCOPY, DUODENOSCOPY (EGD), REMOVE FOREIGN BODY;;  Surgeon: González Metz MD;  [facility]     GYN SURGERY  1983     Multiple ectopic pregnancies, reconnect,       INSERT PORT VASCULAR ACCESS Right 2018    Procedure: INSERT PORT VASCULAR ACCESS;  Chest Port Placement - right;  Surgeon: Chanda Lawson PA-C;  [facility]     IR PORT REMOVAL RIGHT  2019     LAPAROSCOPY DIAGNOSTIC (GENERAL) N/A 2018    Procedure: LAPAROSCOPY DIAGNOSTIC (GENERAL);  Diagnostic Laparoscopy; Open Distal Pancreatectomy And Splenectomy, splenic flexure mobilization, cholecystectomy, intraoperative ultrasound;  Surgeon: Ja Byrd MD;  [facility]     PANCREATECTOMY, SPLENECTOMY N/A 2018    Procedure: PANCREATECTOMY, SPLENECTOMY;;  Surgeon: Ja Byrd MD;  [facility]     REMOVE PORT VASCULAR ACCESS Right 2019    Procedure: Right Port Removal;  Surgeon: Juan J Donaldson PA-C;  [facility]       Social History     Tobacco Use     Smoking status: Former Smoker     Packs/day: 0.50     Years: 5.00     Pack years: 2.50     Types: Cigarettes     Start date: 1974     Quit date:      Years since quittin.0     Smokeless tobacco: Never Used   Substance Use Topics     Alcohol use: No     Alcohol/week: 21.0 standard drinks     Comment: Now quit since Oct 31.  Moderate drinker in past     Family History   Problem Relation Age of Onset     Heart Disease Father      Hyperlipidemia Father      Heart Disease Brother      Diabetes Mother      Hypertension Mother      Obesity Mother      Depression Mother      Diabetes Maternal Grandfather      Hypertension Maternal Grandfather      Obesity Maternal Grandfather      Diabetes Sister      Hypertension Sister      Depression Sister      Anxiety Disorder Sister      Obesity Sister      Hypertension Brother      Hyperlipidemia Brother      Heart Disease Brother      Breast Cancer Cousin      Breast Cancer Cousin      Breast Cancer Cousin      Colon Cancer Other      Other Cancer Other         Mesothelioma     Depression Sister      Anxiety Disorder  Brother      Cancer Brother 64        colon cancer     Anxiety Disorder Son      Depression Son      Mental Illness Sister         Schizophrenia     Hypertension Sister      Obesity Maternal Grandmother      Obesity Sister      Diabetes Nephew      Hypertension Brother          Current Outpatient Medications   Medication Sig Dispense Refill     acetaminophen (TYLENOL) 500 MG tablet Take 2 tablets (1,000 mg) by mouth every 8 hours 100 tablet 1     amylase-lipase-protease (CREON) 13532-09078 units CPEP per EC capsule TAKE 4 CAPSULES WITH MEALS, 2 CAPSULES WITH SNACKS, UP TO 16 CAPSULES PER DAY 1440 capsule 3     blood glucose (ONETOUCH VERIO IQ) test strip Use to test blood sugar 4 times daily or as directed. 400 each 3     clobetasol (TEMOVATE) 0.05 % external ointment Apply topically 2 times daily 45 g 1     clobetasol (TEMOVATE) 0.05 % GEL topical gel Apply topically 2 times daily 15 g 1     metFORMIN (GLUCOPHAGE-XR) 500 MG 24 hr tablet Take 1 tablet (500 mg) by mouth 2 times daily (with meals) 180 tablet 3     OneTouch Delica Lancets 33G MISC 4 lancets daily 150 each 3     No Known Allergies  Recent Labs   Lab Test 12/08/20  0842 10/23/20  1028 10/23/20  1020 04/13/20  0833 04/13/20  0833 10/14/19  0908 10/14/19  0908 04/30/19  1340 04/30/19  1340 10/05/18  0946 10/05/18  0946 05/30/18  0638 05/30/18  0638   A1C  --   --  6.2*  --   --   --  6.7*  --   --   --   --   --  7.8*   LDL  --  83  --   --   --   --  77  --   --   --  108*  --   --    HDL  --  87  --   --   --   --  78  --   --   --  56  --   --    TRIG  --  104  --   --   --   --  80  --   --   --  107  --   --    ALT 17 23  --   --  25  --   --    < >  --    < >  --    < > 26   CR 0.68 0.64  --   --  0.62  --   --    < >  --    < >  --    < > 0.63   GFRESTIMATED >90 >90  --    < > >90   < >  --    < >  --    < >  --    < > >90   GFRESTBLACK >90 >90  --    < > >90   < >  --    < >  --    < >  --    < > >90   POTASSIUM 4.0 4.0  --   --  4.2  --   --    <  ">  --    < >  --    < > 4.3   TSH  --   --   --   --   --   --   --   --  1.48  --   --   --   --     < > = values in this interval not displayed.      BP Readings from Last 3 Encounters:   01/18/21 112/60   10/23/20 130/78   07/14/20 118/82    Wt Readings from Last 3 Encounters:   01/18/21 62.6 kg (138 lb)   10/23/20 61.5 kg (135 lb 9.6 oz)   07/14/20 60.3 kg (133 lb)         ROS:  Constitutional, HEENT, cardiovascular, pulmonary, gi and gu systems are negative, except as otherwise noted.    OBJECTIVE:                                                    /60   Pulse 63   Temp 96  F (35.6  C) (Tympanic)   Resp 14   Ht 1.676 m (5' 6\")   Wt 62.6 kg (138 lb)   SpO2 98%   BMI 22.27 kg/m    GENERAL APPEARANCE ADULT: Alert, no acute distress  EYES: PERRL, EOM normal, conjunctiva and lids normal  HENT: Ears and TMs normal, oral mucosa and posterior oropharynx normal  NECK: No adenopathy,masses or thyromegaly  CV: normal rate, regular rhythm, no murmur or gallop  ABDOMEN: soft, no organomegaly, masses or tenderness   (female): external genitalia abnormal , dry vaginal vault, vagina atrophic  MS: extremities normal, no peripheral edema  SKIN: no suspicious lesions or rashes  PSYCH: mentation appears normal., affect and mood normal  Diagnostic Test Results:  Results for orders placed or performed in visit on 01/18/21 (from the past 24 hour(s))   Wet prep    Specimen: Vagina   Result Value Ref Range    Specimen Description Vagina     Wet Prep No Trichomonas seen     Wet Prep Clue cells seen  Rare   (A)     Wet Prep No yeast seen     Wet Prep No WBC's seen         ASSESSMENT/PLAN:                                                    1. Vaginal itching  Wet prep showed mild clue cells.   - Wet prep  - clobetasol (TEMOVATE) 0.05 % external ointment; Apply topically 2 times daily  Dispense: 45 g; Refill: 1    2. Atrophic vaginitis  - clobetasol (TEMOVATE) 0.05 % external ointment; Apply topically 2 times daily  " Dispense: 45 g; Refill: 1    Solange Mcgill MD  [facility]

## 2021-01-18 NOTE — PATIENT INSTRUCTIONS
Patient Education     Atrophic Vaginitis    Atrophic vaginitis means the walls of your vagina have become thin. This happens when your body makes too little of the hormone estrogen. Menopause or surgical removal of the ovaries are the most common causes for a drop in estrogen. Breastfeeding can also cause the hormone level to drop.   Symptoms of atrophic vaginitis include:    Dryness, soreness, burning, or itching in the vagina    Vaginal discharge  Sex can be uncomfortable, even painful. After sex, you may have bleeding from your vagina. You may also have burning or pain when you urinate.   Home care  Your healthcare provider may recommend one or more of these as treatment:     Vaginal creams, lotions, and lubricants. These products help relieve vaginal dryness. They don t need a prescription. They can be found in the personal care section of most pharmacies. Creams and lotions are used daily to help keep the vagina moist. Lubricants help reduce dryness and pain during sex. Choose water-based lubricants. Don t use petroleum jelly, mineral oil, or other oils. These increase the chance of infection.    Hormone therapy (HT). HT increases the amount of estrogen in your body. This can help manage or relieve symptoms. HT can be given in pill form. It may be given as a lotion, cream, ring put into the vagina, or a patch on the skin. The risks and benefits of HT vary for each woman. For instance, your risk may be higher if you have had breast cancer. Discuss this treatment with your healthcare provider. Not every woman can use HT.  You don t need to stop having sex. In fact, regular sex can help keep vaginal tissues healthy. Take steps to make sex more comfortable by using water-based lubricants.   Preventing infections  Atrophic vaginitis makes an infection of the vagina or the urinary tract more likely. To help reduce your risk:     Keep your genitals clean. When you bathe, wash the outside of your vagina with mild  soap and water. Clean gently between the folds of your vagina.    Wipe from front to back after a bowel movement.    Don t douche unless your healthcare provider tells you to.    Don't use scented toilet paper, scented vaginal sprays, or scented tampons.    Don't wear clothes that are tight in the crotch. These include pantyhose, jeans, and leggings. Wear cotton underwear. Change it every day.  Follow-up care  Follow up with your healthcare provider, or as advised.  When to seek medical advice  Call your health care provider right away if any of these occur:    Fever of 100.4 F (38 C) or higher, or as directed by your healthcare provider    Symptoms don t go away or get worse even with treatment    Vaginal area swells or becomes painful    Vaginal area bleeds, but not because of your period    Bad-smelling discharge from the vagina    Pain or burning when you urinate or you have trouble passing urine    Open sores develop around vagina   Sair[employer] last reviewed this educational content on 10/1/2019    2082-5469 [employer]. [address] [place] [address], [place] [address]. All rights reserved. This information is not intended as a substitute for professional medical care. Always follow your healthcare professional's instructions.

## 2021-03-16 ENCOUNTER — OFFICE VISIT (OUTPATIENT)
Dept: OTOLARYNGOLOGY | Facility: CLINIC | Age: 61
End: 2021-03-16
Payer: COMMERCIAL

## 2021-03-16 VITALS
HEART RATE: 72 BPM | OXYGEN SATURATION: 98 % | BODY MASS INDEX: 22.04 KG/M2 | WEIGHT: 137.13 LBS | HEIGHT: 66 IN | TEMPERATURE: 97.3 F

## 2021-03-16 DIAGNOSIS — K12.1 STOMATITIS AND MUCOSITIS: Primary | ICD-10-CM

## 2021-03-16 DIAGNOSIS — K12.30 STOMATITIS AND MUCOSITIS: Primary | ICD-10-CM

## 2021-03-16 PROBLEM — F10.20 ALCOHOL DEPENDENCE (H): Status: ACTIVE | Noted: 2021-03-16

## 2021-03-16 PROCEDURE — 99213 OFFICE O/P EST LOW 20 MIN: CPT | Performed by: OTOLARYNGOLOGY

## 2021-03-16 RX ORDER — GABAPENTIN 100 MG/1
100 CAPSULE ORAL 3 TIMES DAILY
Qty: 90 CAPSULE | Refills: 1 | Status: SHIPPED | OUTPATIENT
Start: 2021-03-16 | End: 2021-08-11

## 2021-03-16 ASSESSMENT — MIFFLIN-ST. JEOR: SCORE: 1198.75

## 2021-03-16 ASSESSMENT — PAIN SCALES - GENERAL: PAINLEVEL: NO PAIN (0)

## 2021-03-16 NOTE — PROGRESS NOTES
HISTORY OF PRESENT ILLNESS:  Kitty Bangura comes in with a couple of problems today.  She has clearly had this oral lichen planus, which does not bother her that much.  She has had something like a neurogenic-type pain that occurs twice a day in her left face.  This has been going on for quite some time.  It has gotten a little more severe lately.  It does not seem to be associated with any lichen planus at all.  It seems like a peripheral nerve issue, but she has not had anything like zoster or anything else like that on her face.      PHYSICAL EXAMINATION:  The patient is alert and oriented x3 and pleasant.  Skin of the face, lips, and neck on her is otherwise normal.  There are no masses or lesions in her parotid.  There are no other issues within her face today that I can tell, no facial tics, no trigger points.  Oral cavity and oropharynx is clear.  The area looks normal today.  This is being well controlled with clobetasol and with the Magic Mouthwash and with gentian violet and she does this a couple times a week, perhaps.  The floor of mouth and base of tongue are soft.  No other trigger points in her neck.  Neck exam shows no masses, adenopathy or thyromegaly.      ASSESSMENT:  Patient with a history of probably some sort of neuropathic pain in her face.      PLAN:  I am going to give her a small dose of gabapentin. We will see how she is doing again in about six or eight weeks by phone and then go from there.

## 2021-03-16 NOTE — NURSING NOTE
"Chief Complaint   Patient presents with     RECHECK     6 month follow up       Pulse 72, temperature 97.3  F (36.3  C), temperature source Temporal, height 1.676 m (5' 6\"), weight 62.2 kg (137 lb 2 oz), SpO2 98 %, not currently breastfeeding.    Kristina Esquivel, EMT    "

## 2021-03-16 NOTE — PATIENT INSTRUCTIONS
1. You were seen in the ENT Clinic today by Dr. Ward.  If you have any questions or concerns after your appointment, please call   -  ENT Clinic: [phone]      2.   Plan to return to clinic in 12 weeks.     Astrid Clifford LPN  [facility]  [phone]

## 2021-03-16 NOTE — LETTER
3/16/2021       RE: Kitty Bangura  [id number] [facility] [id number]     Dear Colleague,    Thank you for referring your patient, Kitty Bangura, to the [facility] [place] [facility]. Please see a copy of my visit note below.    HISTORY OF PRESENT ILLNESS:  Kitty Bangura comes in with a couple of problems today.  She has clearly had this oral lichen planus, which does not bother her that much.  She has had something like a neurogenic-type pain that occurs twice a day in her left face.  This has been going on for quite some time.  It has gotten a little more severe lately.  It does not seem to be associated with any lichen planus at all.  It seems like a peripheral nerve issue, but she has not had anything like zoster or anything else like that on her face.      PHYSICAL EXAMINATION:  The patient is alert and oriented x3 and pleasant.  Skin of the face, lips, and neck on her is otherwise normal.  There are no masses or lesions in her parotid.  There are no other issues within her face today that I can tell, no facial tics, no trigger points.  Oral cavity and oropharynx is clear.  The area looks normal today.  This is being well controlled with clobetasol and with the Magic Mouthwash and with gentian violet and she does this a couple times a week, perhaps.  The floor of mouth and base of tongue are soft.  No other trigger points in her neck.  Neck exam shows no masses, adenopathy or thyromegaly.      ASSESSMENT:  Patient with a history of probably some sort of neuropathic pain in her face.      PLAN:  I am going to give her a small dose of gabapentin. We will see how she is doing again in about six or eight weeks by phone and then go from there.     Again, thank you for allowing me to participate in the care of your patient.      Sincerely,    Enrique Ward MD

## 2021-03-20 ENCOUNTER — IMMUNIZATION (OUTPATIENT)
Dept: FAMILY MEDICINE | Facility: CLINIC | Age: 61
End: 2021-03-20
Payer: COMMERCIAL

## 2021-03-20 PROCEDURE — 91301 PR COVID VAC MODERNA 100 MCG/0.5 ML IM: CPT

## 2021-03-20 PROCEDURE — 0011A PR COVID VAC MODERNA 100 MCG/0.5 ML IM: CPT

## 2021-03-29 ENCOUNTER — HOSPITAL ENCOUNTER (OUTPATIENT)
Dept: CT IMAGING | Facility: CLINIC | Age: 61
Discharge: HOME | End: 2021-03-29
Attending: INTERNAL MEDICINE | Admitting: INTERNAL MEDICINE
Payer: COMMERCIAL

## 2021-03-29 DIAGNOSIS — C25.8 OVERLAPPING MALIGNANT NEOPLASM OF PANCREAS (H): ICD-10-CM

## 2021-03-29 LAB
ALBUMIN SERPL-MCNC: 3.7 G/DL (ref 3.4–5)
ALP SERPL-CCNC: 74 U/L (ref 40–150)
ALT SERPL W P-5'-P-CCNC: 20 U/L (ref 0–50)
ANION GAP SERPL CALCULATED.3IONS-SCNC: 2 MMOL/L (ref 3–14)
AST SERPL W P-5'-P-CCNC: 18 U/L (ref 0–45)
BASOPHILS # BLD AUTO: 0.1 10E9/L (ref 0–0.2)
BASOPHILS NFR BLD AUTO: 0.6 %
BILIRUB SERPL-MCNC: 0.6 MG/DL (ref 0.2–1.3)
BUN SERPL-MCNC: 12 MG/DL (ref 7–30)
CALCIUM SERPL-MCNC: 9.5 MG/DL (ref 8.5–10.1)
CHLORIDE SERPL-SCNC: 102 MMOL/L (ref 94–109)
CO2 SERPL-SCNC: 30 MMOL/L (ref 20–32)
CREAT BLD-MCNC: 0.7 MG/DL (ref 0.52–1.04)
CREAT SERPL-MCNC: 0.6 MG/DL (ref 0.52–1.04)
DIFFERENTIAL METHOD BLD: NORMAL
EOSINOPHIL # BLD AUTO: 0.2 10E9/L (ref 0–0.7)
EOSINOPHIL NFR BLD AUTO: 1.5 %
ERYTHROCYTE [DISTWIDTH] IN BLOOD BY AUTOMATED COUNT: 12.8 % (ref 10–15)
GFR SERPL CREATININE-BSD FRML MDRD: 85 ML/MIN/{1.73_M2}
GFR SERPL CREATININE-BSD FRML MDRD: >90 ML/MIN/{1.73_M2}
GLUCOSE SERPL-MCNC: 112 MG/DL (ref 70–99)
HCT VFR BLD AUTO: 39.4 % (ref 35–47)
HGB BLD-MCNC: 12.9 G/DL (ref 11.7–15.7)
IMM GRANULOCYTES # BLD: 0 10E9/L (ref 0–0.4)
IMM GRANULOCYTES NFR BLD: 0.3 %
LYMPHOCYTES # BLD AUTO: 4.3 10E9/L (ref 0.8–5.3)
LYMPHOCYTES NFR BLD AUTO: 44 %
MCH RBC QN AUTO: 31.4 PG (ref 26.5–33)
MCHC RBC AUTO-ENTMCNC: 32.7 G/DL (ref 31.5–36.5)
MCV RBC AUTO: 96 FL (ref 78–100)
MONOCYTES # BLD AUTO: 1 10E9/L (ref 0–1.3)
MONOCYTES NFR BLD AUTO: 10.4 %
NEUTROPHILS # BLD AUTO: 4.3 10E9/L (ref 1.6–8.3)
NEUTROPHILS NFR BLD AUTO: 43.2 %
NRBC # BLD AUTO: 0 10*3/UL
NRBC BLD AUTO-RTO: 0 /100
PLATELET # BLD AUTO: 447 10E9/L (ref 150–450)
POTASSIUM SERPL-SCNC: 4.3 MMOL/L (ref 3.4–5.3)
PROT SERPL-MCNC: 7.7 G/DL (ref 6.8–8.8)
RBC # BLD AUTO: 4.11 10E12/L (ref 3.8–5.2)
SODIUM SERPL-SCNC: 134 MMOL/L (ref 133–144)
WBC # BLD AUTO: 9.9 10E9/L (ref 4–11)

## 2021-03-29 PROCEDURE — 80053 COMPREHEN METABOLIC PANEL: CPT | Performed by: INTERNAL MEDICINE

## 2021-03-29 PROCEDURE — 36415 COLL VENOUS BLD VENIPUNCTURE: CPT | Performed by: INTERNAL MEDICINE

## 2021-03-29 PROCEDURE — 99000 SPECIMEN HANDLING OFFICE-LAB: CPT | Performed by: INTERNAL MEDICINE

## 2021-03-29 PROCEDURE — 71260 CT THORAX DX C+: CPT

## 2021-03-29 PROCEDURE — 86301 IMMUNOASSAY TUMOR CA 19-9: CPT | Mod: 90 | Performed by: INTERNAL MEDICINE

## 2021-03-29 PROCEDURE — 250N000009 HC RX 250: Performed by: RADIOLOGY

## 2021-03-29 PROCEDURE — 82565 ASSAY OF CREATININE: CPT

## 2021-03-29 PROCEDURE — 250N000011 HC RX IP 250 OP 636: Performed by: RADIOLOGY

## 2021-03-29 PROCEDURE — 85025 COMPLETE CBC W/AUTO DIFF WBC: CPT | Performed by: INTERNAL MEDICINE

## 2021-03-29 RX ORDER — IOPAMIDOL 755 MG/ML
67 INJECTION, SOLUTION INTRAVASCULAR ONCE
Status: COMPLETED | OUTPATIENT
Start: 2021-03-29 | End: 2021-03-29

## 2021-03-29 RX ADMIN — SODIUM CHLORIDE 57 ML: 9 INJECTION, SOLUTION INTRAVENOUS at 10:09

## 2021-03-29 RX ADMIN — IOPAMIDOL 67 ML: 755 INJECTION, SOLUTION INTRAVENOUS at 10:09

## 2021-03-30 LAB — CANCER AG19-9 SERPL-ACNC: 2 U/ML (ref 0–37)

## 2021-04-01 NOTE — PROGRESS NOTES
"Kitty is a 60 year old who is being evaluated via a billable video visit.      How would you like to obtain your AVS? MyChart  If the video visit is dropped, the invitation should be resent by: Send to e-mail at: [email or web address]  Will anyone else be joining your video visit? No     Vitals - Patient Reported  Weight (Patient Reported): 61.2 kg (135 lb)  Height (Patient Reported): 165.1 cm (5' 5\")  BMI (Based on Pt Reported Ht/Wt): 22.46  Pain Score: No Pain (0)    Wanda Sandy [facility] April 5, 2021  8:25 AM               Oncology Follow-up Visit:  Apr 5, 2021    Cancer diagnosis: cystic pancreatic tail adenocarcinoma  small subcentimeter pulmonary nodules seen on staging scans of unclear etiology.     Treatment to date: neoadjuvant FOLFIRINOX x4 cycles, 1/15/18-3/6/18  Difficulty tolerating neoadjuvant intent chemotherapy. Distal pancreatectomy performed April 17, 2018, no residual carcinoma seen on operative pathology.  Treated with four cycles of adjuvant chemotherapy with gemcitabine and Xeloda, completed September 2018.     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     Referring physician: Dr. González Metz MD    Oncology History: She was previously healthy until she presented in early November 2017 with abdominal pain. CT abdomen on 11/1/17 showed acute pancreatitis (lipase 41,960) and a 3cm heterogenous pancreatic tail mass. The etiology of pancreatitis is unclear - no obstructing gallstone and not a heavy drinker. She was hospitalized for one week and followed up with Dr. González Metz in GI clinic.     11/29/17 -- endoscopic drainage of walled-off area of pancreatic necrosis by Dr. Metz. During the same procedure she had an EUS FNA of the pancreatic tail mass and pathology showed adenocarcinoma. The mass measured 33 x " 27 mm, encapsulated with solid and cystic components, rim calcification, and no evidence of invasion.     12/9/17 -- Staging CT chest/abd/pelvis showed several small pulmonary nodules (largest 3mm), unchanged mass in the pancreatic tail, mildly prominent mesenteric nodes thought likely reactive, and small right hepatic lobe hypodensities. Abdominal MRI on 12/10/17 showed hepatic hemangiomas, no evidence of metastatic disease to the liver.     1/8/18 - - oncology consultation, Dr. Monk. Recommendation: neoadjuvant intent chemotherapy with FOLFIRINOX.    1/15/18 - - cycle 1/day one FOLFIRINOX chemotherapy.    1/20 through 1/27/18 - - hospitalization at the [facility], for acute pancreatitis. Following three days of nausea, vomiting, pain. During this hospitalization: ERCP was attempted by Dr. Sanches with pancreatic stent placement, but pancreatic duct was completely obstructed and wire was unable to pass beyond the duct. Dr. Metz performed successful US guided cyst gastrostomy January 25, 2018.    1/31/18 - - oncology follow-up, DANTE Talavera. Neutropenic with ANC 0.4, thus defer cycle two of chemotherapy.    2/5/18 - - oncology follow-up, DANTE Junior.  Cycle 2/day one FOLFIRINOX chemotherapy. Patient endorses cold sensitivity secondary to oxaliplatin. Neulasta given for growth factor support. Due to significant neutropenia with cycle one.    2/11/18 - - ER evaluation for epigastric pain. Discharged to home from ER. Leukocytosis secondary to Neulasta given on February 8.    2/20/18 - - oncology follow-up, DANTE Junior. Cycle 3/day one FOLFIRINOX given without Neulasta. At patient request, oxaliplatin dose reduced by 10% due to neuropathy/cold sensitivity.    3/6/18 - - oncology follow-up, DANTE Talavera. Cycle 4/day one FOLFIRINOX chemotherapy.    3/13/18 - - CT chest/abdomen/pelvis - - IMPRESSION: 1. Minimal decrease in size and marked decrease in enhancement and  pancreatic mass since the comparison study. 2. No significant change in low dense lesions in the liver since comparison.    3/16/18 - - oncology follow-up Dr. Monk. Plan is to hold on further chemotherapy as surgery is scheduled for 4/17/18 4/17/18 - - surgery: PROCEDURE: Diagnostic laparoscopy, splenic flexure mobilization, intraoperative ultrasound, distal pancreatectomy, splenectomy, and cholecystectomy. SURGEON: Ja Byrd MD  Final surgical pathology showed necrotizing pancreatitis, no evidence of residual cancer, complete pathologic response, 26 benign lymph nodes.  FINAL DIAGNOSIS: A. DISTAL PANCREAS, SPLEEN AND OMENTUM, RESECTION: - Necrotizing pancreatitis with residual acellular mucin and foci of high grade dysplasia, status post neoadjuvant therapy   - Treatment response: complete (score 0)   - Twenty-six benign lymph nodes (0/26)   - Negative for residual carcinoma   - Pathologic staging: [id number]   - Focal infarction, metaplastic bone formation - Surgical margins are free of neoplasia - Unremarkable spleen and omentum   B. GALLBLADDER, CHOLECYSTECTOMY: - Benign gallbladder, biliary mucosa with no significant histologic abnormality - Negative for dysplasia COMMENT: Histologic sections demonstrate pools of acellular mucin, necrosis and scattered high grade dysplasia (PanIN-3) with no evidence of residual invasive carcinoma    4/30/18 - - surgical oncology follow-up, Dr. Byrd.  5/4/18 - - palliative care follow-up Dr. Snow. No specific recommendations required at this time.  5/14/18 - - oncology follow-up, Dr. Monk. Plan: adjuvant chemotherapy with gemcitabine and Xeloda.  5/30/18 - - cycle 1/day one adjuvant chemotherapy with gemcitabine and Xeloda.  Cycle three was dose reduced due to mucositis secondary to Xeloda.  8/28/18 - - oncology follow-up, DANTE Talavera. Cycle 4/day one gemcitabine and Xeloda.  9/17/18 - - CT chest/abdomen/pelvis - -IMPRESSION:  Resolving postoperative changes. No  new findings or evidence of metastatic disease.  9/24/18 - - oncology follow-up, Dr. Monk. PLAN: initiate active surveillance.  12/21/18--CT c/a/p--IMPRESSION:  1. Stable tiny 2 to 3 mm lung nodules as described above. This  suggests a benign etiology.  2. Two low density liver lesions. These appear to vary in appearance  depending on slight variations in timing of the bolus of contrast.  Likely no significant change. Recommend continued close surveillance.  3. No evidence of recurrent mass in the abdomen or pelvis. No  adenopathy.  12/28/18--scheduled oncology follow-up, Dr Monk. PATIENT NO-SHOW.  12/31/18--oncology follow-up, Dr Monk. Plan: Continue surveillance.  Increase Creon dose due to fatty food intolerance/weight loss.    3/29/19--CT c/a/p-- IMPRESSION: Stable scan compared to 12/21/2018 with the following  findings:  1. Several bilateral small pulmonary nodules, unchanged in appearance.  2. Two right hepatic small hypodense lesions, possibly hemangiomas.  These are also stable in appearance.  3. No apparent recurrent mass.  4/5/19 - - oncology follow-up, Dr. Monk.     7/1/19--CT c/a/p--IMPRESSION:  Stable examination with no convincing metastatic or  recurrent neoplasm.  7/8/19--oncology follow-up, Dr. Monk.    9/30/19 - - CT chest/abdomen/pelvis - - IMPRESSION:  1.  There is a small grouped area of nodularity in the right middle  lobe. Grouped nature favors an inflammatory etiology.  2.  The abdomen CT is stable with no convincing metastatic or  recurrent neoplasm.  10/7/19 - - oncology follow-up, Dr. Monk.    12/13/19 - - CT chest/abdomen/pelvis - - IMPRESSION:  1.  Stable abdomen/pelvis CT with no convincing metastatic or  recurrent neoplasm.  2. New/additional small focus of grouped nodularity in the right  middle lobe. Inflammatory etiology is still favored.  12/20/19 - - oncology follow-up, Dr. Monk.    4/13/20--CT c/a/p--IMPRESSION:  Interval resolution of suspected inflammatory change in right middle  lobe. Otherwise, stable examination.       4/20/20--oncology follow-up, Dr. Monk.      8/3/20--CT c/a/p--IMPRESSION:  Stable examination with no convincing metastatic or  recurrent neoplasm.    August 10, 2020 -- oncology follow-up/virtual visit, Dr. Monk.  12/8/20--CT c/a/p--IMPRESSION:  1.  Stable appearance of distal pancreatectomy and splenectomy.  2.  Stable tiny nodules through both lungs.  3.  New area of likely atelectasis in the posterior left apex  measuring 6 mm. Follow-up would be recommended in six months or to  correspond with the next routine oncologic follow-up.  4.  Two stable small ill-defined lesions in the right hepatic lobe.    December 11, 2020 -- oncology follow-up/virtual visit, Dr. Monk.    3/29/21--CT c/a/p--                                                               IMPRESSION:  1.  Stable appearance of distal pancreatectomy and splenectomy.  2.  The questioned nodular area in the posterior left apex on the  previous exam has nearly completely resolved.  3.  There is a new 8 mm slightly lobulated nodular opacity in the  anterior left lower lobe. Given the appearance over the course of  three months this is likely benign, but follow-up is recommended.  4.  Interval slight increase in size of level 1 and level 2 left  axillary lymph nodes that are now prominent to borderline enlarged.  These could be due to reactive process, but attention is recommended  in future exams.  5. Two low-attenuation areas are again identified in the liver. One of  these appears minimally increased in size, but these are both still  below 1 cm in size. Continued attention to this area would be  recommended, but these are likely benign.    April 5, 2021 -- oncology follow-up/virtual visit, Dr. Monk.      Interim History:  Ms. Bangura is in the video visit for follow up. She reports doing well overall, she has stable weight, her appetite is very good. She remains very active.  She still has intermittent epigastric  pain/discomfort which has been there since her pancreatic surgery. The discomfort/pain usually resolves in couple of hours without any intervention, especially after she has 1-2 bowel movements. She does not need to take any pain medications. She denies any small bowel obstruction episode.   She had her first COVID vaccine about 2 week ago in her left arm. She denies any fever/chill, no chest pain, no dyspnea, no blood in the stool. She sometimes has diarrhea, no constipation, no blood in the urine. She has some mild lower back pain and is taking Tylenol as needed for it. She denies any other joint pain. No other discomfort.         Review Of Systems:  Comprehensive 14-point ROS reviewed. Pertinent symptoms reviewed above per HPI.    PAST MEDICAL HISTORY:   Pre-diabetes  Pancreatitis as above     PAST SURGICAL HISTORY:  Laparotomy x2 for ruptured tubal pregnancies    Discectomy for herniated lumbar disk  Breast reduction surgery     FAMILY HISTORY:  Colon cancer in maternal aunt  Paternal aunt and cousins with breast cancer  CAD in father and brother     SH: , from [place], with 30+ yr-old son. Works as . former smoker, quit 30 years ago. two glasses of wine per night, none since pancreatitis diagnosis     Allergies: none      Current Outpatient Medications:      acetaminophen (TYLENOL) 500 MG tablet, Take 2 tablets (1,000 mg) by mouth every 8 hours, Disp: 100 tablet, Rfl: 1     amylase-lipase-protease (CREON) 19188-08954 units CPEP per EC capsule, TAKE 4 CAPSULES WITH MEALS, 2 CAPSULES WITH SNACKS, UP TO 16 CAPSULES PER DAY, Disp: 1440 capsule, Rfl: 3     blood glucose (ONETOUCH VERIO IQ) test strip, Use to test blood sugar 4 times daily or as directed., Disp: 400 each, Rfl: 3     clobetasol (TEMOVATE) 0.05 % external ointment, Apply topically 2 times daily, Disp: 45 g, Rfl: 1     clobetasol (TEMOVATE) 0.05 % GEL topical gel, Apply topically 2 times daily, Disp: 15 g, Rfl: 1      gabapentin (NEURONTIN) 100 MG capsule, Take 1 capsule (100 mg) by mouth 3 times daily, Disp: 90 capsule, Rfl: 1     metFORMIN (GLUCOPHAGE-XR) 500 MG 24 hr tablet, Take 1 tablet (500 mg) by mouth 2 times daily (with meals), Disp: 180 tablet, Rfl: 3     OneTouch Delica Lancets 33G MISC, 4 lancets daily, Disp: 150 each, Rfl: 3  No current facility-administered medications for this visit.     Facility-Administered Medications Ordered in Other Visits:      heparin 100 UNIT/ML injection 5 mL, 5 mL, Intracatheter, Once, Art Guevara MD      Physical Exam:  Physical exam could not be performed today in context of a Virtual Visit during the COVID19/Coronavirus pandemic.       Observed physical assessments made today by visualizing the patient by video link:    General/Constitutional: Generally appears well, not acutely ill.  HEENT: no scleral icterus, not jaundiced.  Respiratory: no labored breathing.  Musculoskeletal: appears to have full range of motion and adequate physical strength.  Skin: no jaundice, discoloration or other notable lesions.  Neurological: no evidence of tremors.  Psychiatric: no evident anxiety; fully alert and oriented with fluent speech.      The rest of a comprehensive physical examination is deferred due to PHE (public health emergency) video visit restrictions.    Laboratory/Imaging Studies  Prior to and including the day of this visit, I personally reviewed the recent imaging scans. I released the pertinent recent imaging results to [employer] in advance of this visit, and reviewed the summary verbatim and in lay language during today's call.  Results for EZEQUIEL BANGURA (MRN [id number]) as of 4/1/2021 12:17   Ref. Range 12/13/2019 08:48 4/13/2020 08:33 8/3/2020 09:55 12/8/2020 08:42 3/29/2021 09:10   Cancer Antigen 19-9 Latest Ref Range: 0 - 37 U/mL 2 3 3 3 2       ASSESSMENT/PLAN:  Mrs. Bangura is a 60 year old woman with resected pancreatic tail adenocarcinoma.  She had initial  extensive and severe necrotizing pancreatitis upon initial diagnosis and hospitalization that delayed treatment.  We ultimately were able to begin neoadjuvant FOLFIRINOX for 4 cycles beginning in January.  By the time she had pancreatectomy by Dr. Byrd in mid April there was no evidence of residual carcinoma seen on operative pathology.  She completed subsequently 4 months of gemcitabine and Xeloda in the adjuvant setting.       At this point, she is almost 3 years out from her surgery.  Again, we reviewed that her post-neoadjuvant chemotherapy resected specimen pathology showed no residual carcinoma. She is doing well clinically. Her most recently CT C/A/P showed no evidence of recurrent disease. We reviewed the nodules in her left lower lung is likely benign. The recent CT did show 2 lesions in the liver which is also most likely benign lesions. But given all these findings, we suggested a 3-month imaging follow up with another MRI of abdomen and pelvis and CT chest for surveillance just to make sure these are nothing concerning. We will also obtain a CA 19-9 check on blood draw during the same time. After that, if all remains stable, we will plan to do surveillance imaging in 6-12 months interval.   Of note, for the left enlarged axillary lymph nodes noticed in the same CT scan done on 3/29/21, they are most likely inflammatory related to her COVID vaccine injection (which was given around 3/20 into her left arm); and we do not feel that is something concerning.     We advised her to call us if she develops any concerning symptoms, such as but not limited to, jaundice, abdominal pain or other concerning symptoms, in which case we could do further evaluation sooner. RTC with Dr Monk or AMOR in 3 month after the MRI of abdomen.       Patient was discussed with Dr Monk.     Jaime Andujar  Hem/Onco Fellow            MEDICAL ONCOLOGY ATTENDING PHYSICIAN ADDENDUM:  I have seen and evaluated this patient with the medical  oncology fellow. I have reviewed and edited this fellow's note, and agree with the assessment and plan stated above. A complete review of systems was assessed, and pertinent positives/negatives are discussed in detail above and as follows.    Ms. Bangura returns for an PsyQic-based virtual video visit in the setting of the ongoing coronavirus pandemic and precautionary stay-at-home orders.  She is in her home, and we are in our academic office during the course of this encounter.      She is 3+ years out from the time of diagnosis of pancreatic cancer.  She underwent neoadjuvant-intent chemotherapy.  She had bouts of some pretty significant pancreatitis, but ultimately on surgical resection in the spring of 2018, there was no evidence of residual malignancy seen.  She is nearly 3 years out.  I think she has the best possible prognostic category.  My overall impression is she is a long-term survivor and thriver out from her pancreatic cancer.  Her CT scan done on 03/29 did not show any obvious evidence of marco a pancreatic tumor recurrence.  There are some small areas in her liver that are less than a centimeter in size, for which we will get an MRI in several months just to follow up on.  It appears that it is highly likely they are benign in origin.      We will obtain an MRI abdomen just to ensure that is the case.  Her CT scan of the chest we will obtain in 3 months because she has some nodules that appear inflammatory in nature, one of which disappeared since the last scan, but another one which is new and less than a centimeter in size.  She denies any known allergens or other issues or upper respiratory infections since the last time.  The left axillary lymph node that appears enlarged I attribute to her first of the dual COVID vaccinations she received a few weeks ago, and she is due in a couple weeks for her second vaccination.  The  is unremarkable at 2.  Please see the fellow's note for further  details.             VIRTUAL VISIT - DETAILS:    I have reviewed the note as documented above. This accurately captures the substance of my conversation with the patient.    Date of call: April 5, 2021   Start of call: 9:00 am  End of call: 9:13 am    Provider location: [facility] (academic office)  Patient location: Home        Mode of Video Visit: Amkathy         I spent 20 minutes on the day of the visit reviewing updated information regarding this case, including notes, imaging, labs, and other pertinent results.        Jovany Monk MD PhD

## 2021-04-05 ENCOUNTER — VIRTUAL VISIT (OUTPATIENT)
Dept: ONCOLOGY | Facility: CLINIC | Age: 61
End: 2021-04-05
Attending: INTERNAL MEDICINE
Payer: COMMERCIAL

## 2021-04-05 DIAGNOSIS — C25.9 MALIGNANT NEOPLASM OF PANCREAS, UNSPECIFIED LOCATION OF MALIGNANCY (H): Primary | ICD-10-CM

## 2021-04-05 PROCEDURE — 99213 OFFICE O/P EST LOW 20 MIN: CPT | Mod: 95 | Performed by: INTERNAL MEDICINE

## 2021-04-05 PROCEDURE — 999N001193 HC VIDEO/TELEPHONE VISIT; NO CHARGE

## 2021-04-05 NOTE — LETTER
"    4/5/2021         RE: Kitty Bangura  [id number] [facility] [id number]        Dear Colleague,    Thank you for referring your patient, Kitty Bangura, to the [facility]. Please see a copy of my visit note below.    Kitty is a 60 year old who is being evaluated via a billable video visit.      How would you like to obtain your AVS? MyChart  If the video visit is dropped, the invitation should be resent by: Send to e-mail at: [email or web address]  Will anyone else be joining your video visit? No     Vitals - Patient Reported  Weight (Patient Reported): 61.2 kg (135 lb)  Height (Patient Reported): 165.1 cm (5' 5\")  BMI (Based on Pt Reported Ht/Wt): 22.46  Pain Score: No Pain (0)    Wanda Sandy CMA April 5, 2021  8:25 AM               Oncology Follow-up Visit:  Apr 5, 2021    Cancer diagnosis: cystic pancreatic tail adenocarcinoma  small subcentimeter pulmonary nodules seen on staging scans of unclear etiology.     Treatment to date: neoadjuvant FOLFIRINOX x4 cycles, 1/15/18-3/6/18  Difficulty tolerating neoadjuvant intent chemotherapy. Distal pancreatectomy performed April 17, 2018, no residual carcinoma seen on operative pathology.  Treated with four cycles of adjuvant chemotherapy with gemcitabine and Xeloda, completed September 2018.     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     Referring physician: Dr. González Metz MD    Oncology History: She was previously healthy until she presented in early November 2017 with abdominal pain. CT abdomen on 11/1/17 showed acute pancreatitis (lipase 41,960) and a 3cm heterogenous pancreatic tail mass. The etiology of pancreatitis is unclear - no obstructing gallstone and not a heavy drinker. She was hospitalized for one week and followed up " with Dr. González Metz in GI clinic.     11/29/17 -- endoscopic drainage of walled-off area of pancreatic necrosis by Dr. Metz. During the same procedure she had an EUS FNA of the pancreatic tail mass and pathology showed adenocarcinoma. The mass measured 33 x 27 mm, encapsulated with solid and cystic components, rim calcification, and no evidence of invasion.     12/9/17 -- Staging CT chest/abd/pelvis showed several small pulmonary nodules (largest 3mm), unchanged mass in the pancreatic tail, mildly prominent mesenteric nodes thought likely reactive, and small right hepatic lobe hypodensities. Abdominal MRI on 12/10/17 showed hepatic hemangiomas, no evidence of metastatic disease to the liver.     1/8/18 - - oncology consultation, Dr. Monk. Recommendation: neoadjuvant intent chemotherapy with FOLFIRINOX.    1/15/18 - - cycle 1/day one FOLFIRINOX chemotherapy.    1/20 through 1/27/18 - - hospitalization at the [facility], for acute pancreatitis. Following three days of nausea, vomiting, pain. During this hospitalization: ERCP was attempted by Dr. Sanches with pancreatic stent placement, but pancreatic duct was completely obstructed and wire was unable to pass beyond the duct. Dr. eMtz performed successful US guided cyst gastrostomy January 25, 2018.    1/31/18 - - oncology follow-up, DANTE Talavera. Neutropenic with ANC 0.4, thus defer cycle two of chemotherapy.    2/5/18 - - oncology follow-up, DANTE Junior.  Cycle 2/day one FOLFIRINOX chemotherapy. Patient endorses cold sensitivity secondary to oxaliplatin. Neulasta given for growth factor support. Due to significant neutropenia with cycle one.    2/11/18 - - ER evaluation for epigastric pain. Discharged to home from ER. Leukocytosis secondary to Neulasta given on February 8.    2/20/18 - - oncology follow-up, DANTE Junior. Cycle 3/day one FOLFIRINOX given without Neulasta. At patient request, oxaliplatin dose reduced by  10% due to neuropathy/cold sensitivity.    3/6/18 - - oncology follow-up, DANTE Talavera. Cycle 4/day one FOLFIRINOX chemotherapy.    3/13/18 - - CT chest/abdomen/pelvis - - IMPRESSION: 1. Minimal decrease in size and marked decrease in enhancement and pancreatic mass since the comparison study. 2. No significant change in low dense lesions in the liver since comparison.    3/16/18 - - oncology follow-up Dr. Monk. Plan is to hold on further chemotherapy as surgery is scheduled for 4/17/18 4/17/18 - - surgery: PROCEDURE: Diagnostic laparoscopy, splenic flexure mobilization, intraoperative ultrasound, distal pancreatectomy, splenectomy, and cholecystectomy. SURGEON: Ja Byrd MD  Final surgical pathology showed necrotizing pancreatitis, no evidence of residual cancer, complete pathologic response, 26 benign lymph nodes.  FINAL DIAGNOSIS: A. DISTAL PANCREAS, SPLEEN AND OMENTUM, RESECTION: - Necrotizing pancreatitis with residual acellular mucin and foci of high grade dysplasia, status post neoadjuvant therapy   - Treatment response: complete (score 0)   - Twenty-six benign lymph nodes (0/26)   - Negative for residual carcinoma   - Pathologic staging: [id number]   - Focal infarction, metaplastic bone formation - Surgical margins are free of neoplasia - Unremarkable spleen and omentum   B. GALLBLADDER, CHOLECYSTECTOMY: - Benign gallbladder, biliary mucosa with no significant histologic abnormality - Negative for dysplasia COMMENT: Histologic sections demonstrate pools of acellular mucin, necrosis and scattered high grade dysplasia (PanIN-3) with no evidence of residual invasive carcinoma    4/30/18 - - surgical oncology follow-up, Dr. Byrd.  5/4/18 - - palliative care follow-up Dr. Snow. No specific recommendations required at this time.  5/14/18 - - oncology follow-up, Dr. Monk. Plan: adjuvant chemotherapy with gemcitabine and Xeloda.  5/30/18 - - cycle 1/day one adjuvant chemotherapy with gemcitabine and  Xeloda.  Cycle three was dose reduced due to mucositis secondary to Xeloda.  8/28/18 - - oncology follow-up, DANTE Talavera. Cycle 4/day one gemcitabine and Xeloda.  9/17/18 - - CT chest/abdomen/pelvis - -IMPRESSION:  Resolving postoperative changes. No new findings or evidence of metastatic disease.  9/24/18 - - oncology follow-up, Dr. Monk. PLAN: initiate active surveillance.  12/21/18--CT c/a/p--IMPRESSION:  1. Stable tiny 2 to 3 mm lung nodules as described above. This  suggests a benign etiology.  2. Two low density liver lesions. These appear to vary in appearance  depending on slight variations in timing of the bolus of contrast.  Likely no significant change. Recommend continued close surveillance.  3. No evidence of recurrent mass in the abdomen or pelvis. No  adenopathy.  12/28/18--scheduled oncology follow-up, Dr Monk. PATIENT NO-SHOW.  12/31/18--oncology follow-up, Dr Monk. Plan: Continue surveillance.  Increase Creon dose due to fatty food intolerance/weight loss.    3/29/19--CT c/a/p-- IMPRESSION: Stable scan compared to 12/21/2018 with the following  findings:  1. Several bilateral small pulmonary nodules, unchanged in appearance.  2. Two right hepatic small hypodense lesions, possibly hemangiomas.  These are also stable in appearance.  3. No apparent recurrent mass.  4/5/19 - - oncology follow-up, Dr. Monk.     7/1/19--CT c/a/p--IMPRESSION:  Stable examination with no convincing metastatic or  recurrent neoplasm.  7/8/19--oncology follow-up, Dr. Monk.    9/30/19 - - CT chest/abdomen/pelvis - - IMPRESSION:  1.  There is a small grouped area of nodularity in the right middle  lobe. Grouped nature favors an inflammatory etiology.  2.  The abdomen CT is stable with no convincing metastatic or  recurrent neoplasm.  10/7/19 - - oncology follow-up, Dr. Monk.    12/13/19 - - CT chest/abdomen/pelvis - - IMPRESSION:  1.  Stable abdomen/pelvis CT with no convincing metastatic or  recurrent neoplasm.  2.  New/additional small focus of grouped nodularity in the right  middle lobe. Inflammatory etiology is still favored.  12/20/19 - - oncology follow-up, Dr. Monk.    4/13/20--CT c/a/p--IMPRESSION:  Interval resolution of suspected inflammatory change in right middle lobe. Otherwise, stable examination.       4/20/20--oncology follow-up, Dr. Monk.      8/3/20--CT c/a/p--IMPRESSION:  Stable examination with no convincing metastatic or  recurrent neoplasm.    August 10, 2020 -- oncology follow-up/virtual visit, Dr. Monk.  12/8/20--CT c/a/p--IMPRESSION:  1.  Stable appearance of distal pancreatectomy and splenectomy.  2.  Stable tiny nodules through both lungs.  3.  New area of likely atelectasis in the posterior left apex  measuring 6 mm. Follow-up would be recommended in six months or to  correspond with the next routine oncologic follow-up.  4.  Two stable small ill-defined lesions in the right hepatic lobe.    December 11, 2020 -- oncology follow-up/virtual visit, Dr. Monk.    3/29/21--CT c/a/p--                                                               IMPRESSION:  1.  Stable appearance of distal pancreatectomy and splenectomy.  2.  The questioned nodular area in the posterior left apex on the  previous exam has nearly completely resolved.  3.  There is a new 8 mm slightly lobulated nodular opacity in the  anterior left lower lobe. Given the appearance over the course of  three months this is likely benign, but follow-up is recommended.  4.  Interval slight increase in size of level 1 and level 2 left  axillary lymph nodes that are now prominent to borderline enlarged.  These could be due to reactive process, but attention is recommended  in future exams.  5. Two low-attenuation areas are again identified in the liver. One of  these appears minimally increased in size, but these are both still  below 1 cm in size. Continued attention to this area would be  recommended, but these are likely benign.    April 5, 2021 --  oncology follow-up/virtual visit, Dr. Monk.      Interim History:  Ms. Bangura is in the video visit for follow up. She reports doing well overall, she has stable weight, her appetite is very good. She remains very active.  She still has intermittent epigastric pain/discomfort which has been there since her pancreatic surgery. The discomfort/pain usually resolves in couple of hours without any intervention, especially after she has 1-2 bowel movements. She does not need to take any pain medications. She denies any small bowel obstruction episode.   She had her first COVID vaccine about 2 week ago in her left arm. She denies any fever/chill, no chest pain, no dyspnea, no blood in the stool. She sometimes has diarrhea, no constipation, no blood in the urine. She has some mild lower back pain and is taking Tylenol as needed for it. She denies any other joint pain. No other discomfort.         Review Of Systems:  Comprehensive 14-point ROS reviewed. Pertinent symptoms reviewed above per HPI.    PAST MEDICAL HISTORY:   Pre-diabetes  Pancreatitis as above     PAST SURGICAL HISTORY:  Laparotomy x2 for ruptured tubal pregnancies    Discectomy for herniated lumbar disk  Breast reduction surgery     FAMILY HISTORY:  Colon cancer in maternal aunt  Paternal aunt and cousins with breast cancer  CAD in father and brother     SH: , from [place], with 30+ yr-old son. Works as . former smoker, quit 30 years ago. two glasses of wine per night, none since pancreatitis diagnosis     Allergies: none      Current Outpatient Medications:      acetaminophen (TYLENOL) 500 MG tablet, Take 2 tablets (1,000 mg) by mouth every 8 hours, Disp: 100 tablet, Rfl: 1     amylase-lipase-protease (CREON) 35862-91541 units CPEP per EC capsule, TAKE 4 CAPSULES WITH MEALS, 2 CAPSULES WITH SNACKS, UP TO 16 CAPSULES PER DAY, Disp: 1440 capsule, Rfl: 3     blood glucose (ONETOUCH VERIO IQ) test strip, Use to test blood sugar 4  times daily or as directed., Disp: 400 each, Rfl: 3     clobetasol (TEMOVATE) 0.05 % external ointment, Apply topically 2 times daily, Disp: 45 g, Rfl: 1     clobetasol (TEMOVATE) 0.05 % GEL topical gel, Apply topically 2 times daily, Disp: 15 g, Rfl: 1     gabapentin (NEURONTIN) 100 MG capsule, Take 1 capsule (100 mg) by mouth 3 times daily, Disp: 90 capsule, Rfl: 1     metFORMIN (GLUCOPHAGE-XR) 500 MG 24 hr tablet, Take 1 tablet (500 mg) by mouth 2 times daily (with meals), Disp: 180 tablet, Rfl: 3     OneTouch Delica Lancets 33G MISC, 4 lancets daily, Disp: 150 each, Rfl: 3  No current facility-administered medications for this visit.     Facility-Administered Medications Ordered in Other Visits:      heparin 100 UNIT/ML injection 5 mL, 5 mL, Intracatheter, Once, Art Guevara MD      Physical Exam:  Physical exam could not be performed today in context of a Virtual Visit during the COVID19/Coronavirus pandemic.       Observed physical assessments made today by visualizing the patient by video link:    General/Constitutional: Generally appears well, not acutely ill.  HEENT: no scleral icterus, not jaundiced.  Respiratory: no labored breathing.  Musculoskeletal: appears to have full range of motion and adequate physical strength.  Skin: no jaundice, discoloration or other notable lesions.  Neurological: no evidence of tremors.  Psychiatric: no evident anxiety; fully alert and oriented with fluent speech.      The rest of a comprehensive physical examination is deferred due to PHE (public health emergency) video visit restrictions.    Laboratory/Imaging Studies  Prior to and including the day of this visit, I personally reviewed the recent imaging scans. I released the pertinent recent imaging results to [employer] in advance of this visit, and reviewed the summary verbatim and in lay language during today's call.  Results for PALMIRA EZEQUIEL M (MRN [id number]) as of 4/1/2021 12:17   Ref. Range 12/13/2019  08:48 4/13/2020 08:33 8/3/2020 09:55 12/8/2020 08:42 3/29/2021 09:10   Cancer Antigen 19-9 Latest Ref Range: 0 - 37 U/mL 2 3 3 3 2       ASSESSMENT/PLAN:  Mrs. Bangura is a 60 year old woman with resected pancreatic tail adenocarcinoma.  She had initial extensive and severe necrotizing pancreatitis upon initial diagnosis and hospitalization that delayed treatment.  We ultimately were able to begin neoadjuvant FOLFIRINOX for 4 cycles beginning in January.  By the time she had pancreatectomy by Dr. Byrd in mid April there was no evidence of residual carcinoma seen on operative pathology.  She completed subsequently 4 months of gemcitabine and Xeloda in the adjuvant setting.       At this point, she is almost 3 years out from her surgery.  Again, we reviewed that her post-neoadjuvant chemotherapy resected specimen pathology showed no residual carcinoma. She is doing well clinically. Her most recently CT C/A/P showed no evidence of recurrent disease. We reviewed the nodules in her left lower lung is likely benign. The recent CT did show 2 lesions in the liver which is also most likely benign lesions. But given all these findings, we suggested a 3-month imaging follow up with another MRI of abdomen and pelvis and CT chest for surveillance just to make sure these are nothing concerning. We will also obtain a CA 19-9 check on blood draw during the same time. After that, if all remains stable, we will plan to do surveillance imaging in 6-12 months interval.   Of note, for the left enlarged axillary lymph nodes noticed in the same CT scan done on 3/29/21, they are most likely inflammatory related to her COVID vaccine injection (which was given around 3/20 into her left arm); and we do not feel that is something concerning.     We advised her to call us if she develops any concerning symptoms, such as but not limited to, jaundice, abdominal pain or other concerning symptoms, in which case we could do further evaluation  sooner. RTC with Dr Monk or AMOR in 3 month after the MRI of abdomen.       Patient was discussed with Dr Monk.     Jaime Andujar  Hem/Onco Fellow            MEDICAL ONCOLOGY ATTENDING PHYSICIAN ADDENDUM:  I have seen and evaluated this patient with the medical oncology fellow. I have reviewed and edited this fellow's note, and agree with the assessment and plan stated above. A complete review of systems was assessed, and pertinent positives/negatives are discussed in detail above and as follows.    Ms. Bangura returns for an [employer]-based virtual video visit in the setting of the ongoing coronavirus pandemic and precautionary stay-at-home orders.  She is in her home, and we are in our academic office during the course of this encounter.      She is 3+ years out from the time of diagnosis of pancreatic cancer.  She underwent neoadjuvant-intent chemotherapy.  She had bouts of some pretty significant pancreatitis, but ultimately on surgical resection in the spring of 2018, there was no evidence of residual malignancy seen.  She is nearly 3 years out.  I think she has the best possible prognostic category.  My overall impression is she is a long-term survivor and thriver out from her pancreatic cancer.  Her CT scan done on 03/29 did not show any obvious evidence of marco a pancreatic tumor recurrence.  There are some small areas in her liver that are less than a centimeter in size, for which we will get an MRI in several months just to follow up on.  It appears that it is highly likely they are benign in origin.      We will obtain an MRI abdomen just to ensure that is the case.  Her CT scan of the chest we will obtain in 3 months because she has some nodules that appear inflammatory in nature, one of which disappeared since the last scan, but another one which is new and less than a centimeter in size.  She denies any known allergens or other issues or upper respiratory infections since the last time.  The left axillary lymph  node that appears enlarged I attribute to her first of the dual COVID vaccinations she received a few weeks ago, and she is due in a couple weeks for her second vaccination.  The  is unremarkable at 2.  Please see the fellow's note for further details.             VIRTUAL VISIT - DETAILS:    I have reviewed the note as documented above. This accurately captures the substance of my conversation with the patient.    Date of call: April 5, 2021   Start of call: 9:00 am  End of call: 9:13 am    Provider location: [facility] (academic office)  Patient location: Home        Mode of Video Visit: [facility]         I spent 20 minutes on the day of the visit reviewing updated information regarding this case, including notes, imaging, labs, and other pertinent results.        Jovany Monk MD PhD                        Again, thank you for allowing me to participate in the care of your patient.        Sincerely,        Jovany Monk MD

## 2021-04-17 ENCOUNTER — IMMUNIZATION (OUTPATIENT)
Dept: FAMILY MEDICINE | Facility: CLINIC | Age: 61
End: 2021-04-17
Attending: FAMILY MEDICINE
Payer: COMMERCIAL

## 2021-04-17 PROCEDURE — 0012A PR COVID VAC MODERNA 100 MCG/0.5 ML IM: CPT

## 2021-04-17 PROCEDURE — 91301 PR COVID VAC MODERNA 100 MCG/0.5 ML IM: CPT

## 2021-05-22 ENCOUNTER — HEALTH MAINTENANCE LETTER (OUTPATIENT)
Age: 61
End: 2021-05-22

## 2021-05-31 ENCOUNTER — RECORDS - HEALTHEAST (OUTPATIENT)
Dept: ADMINISTRATIVE | Facility: CLINIC | Age: 61
End: 2021-05-31

## 2021-06-23 ENCOUNTER — DOCUMENTATION ONLY (OUTPATIENT)
Dept: LAB | Facility: CLINIC | Age: 61
End: 2021-06-23

## 2021-06-28 ENCOUNTER — DOCUMENTATION ONLY (OUTPATIENT)
Dept: LAB | Facility: CLINIC | Age: 61
End: 2021-06-28

## 2021-06-28 ENCOUNTER — HOSPITAL ENCOUNTER (OUTPATIENT)
Dept: MRI IMAGING | Facility: CLINIC | Age: 61
End: 2021-06-28
Attending: FAMILY MEDICINE
Payer: COMMERCIAL

## 2021-06-28 ENCOUNTER — HOSPITAL ENCOUNTER (OUTPATIENT)
Dept: CT IMAGING | Facility: CLINIC | Age: 61
End: 2021-06-28
Attending: FAMILY MEDICINE
Payer: COMMERCIAL

## 2021-06-28 DIAGNOSIS — C25.9 PANCREATIC ADENOCARCINOMA (H): Primary | ICD-10-CM

## 2021-06-28 DIAGNOSIS — C25.9 MALIGNANT NEOPLASM OF PANCREAS, UNSPECIFIED LOCATION OF MALIGNANCY (H): ICD-10-CM

## 2021-06-28 LAB
ALBUMIN SERPL-MCNC: 4.1 G/DL (ref 3.4–5)
ALP SERPL-CCNC: 79 U/L (ref 40–150)
ALT SERPL W P-5'-P-CCNC: 21 U/L (ref 0–50)
ANION GAP SERPL CALCULATED.3IONS-SCNC: 5 MMOL/L (ref 3–14)
AST SERPL W P-5'-P-CCNC: 20 U/L (ref 0–45)
BASOPHILS # BLD AUTO: 0.1 10E9/L (ref 0–0.2)
BASOPHILS NFR BLD AUTO: 0.6 %
BILIRUB SERPL-MCNC: 0.8 MG/DL (ref 0.2–1.3)
BUN SERPL-MCNC: 15 MG/DL (ref 7–30)
CALCIUM SERPL-MCNC: 9.4 MG/DL (ref 8.5–10.1)
CHLORIDE SERPL-SCNC: 101 MMOL/L (ref 94–109)
CO2 SERPL-SCNC: 30 MMOL/L (ref 20–32)
CREAT BLD-MCNC: 0.7 MG/DL (ref 0.52–1.04)
CREAT SERPL-MCNC: 0.6 MG/DL (ref 0.52–1.04)
DIFFERENTIAL METHOD BLD: NORMAL
EOSINOPHIL # BLD AUTO: 0.1 10E9/L (ref 0–0.7)
EOSINOPHIL NFR BLD AUTO: 1.1 %
ERYTHROCYTE [DISTWIDTH] IN BLOOD BY AUTOMATED COUNT: 13.3 % (ref 10–15)
GFR SERPL CREATININE-BSD FRML MDRD: 85 ML/MIN/{1.73_M2}
GFR SERPL CREATININE-BSD FRML MDRD: >90 ML/MIN/{1.73_M2}
GLUCOSE SERPL-MCNC: 104 MG/DL (ref 70–99)
HCT VFR BLD AUTO: 41 % (ref 35–47)
HGB BLD-MCNC: 13.3 G/DL (ref 11.7–15.7)
LYMPHOCYTES # BLD AUTO: 3.6 10E9/L (ref 0.8–5.3)
LYMPHOCYTES NFR BLD AUTO: 43.9 %
MCH RBC QN AUTO: 31.2 PG (ref 26.5–33)
MCHC RBC AUTO-ENTMCNC: 32.4 G/DL (ref 31.5–36.5)
MCV RBC AUTO: 96 FL (ref 78–100)
MONOCYTES # BLD AUTO: 1.2 10E9/L (ref 0–1.3)
MONOCYTES NFR BLD AUTO: 14.7 %
NEUTROPHILS # BLD AUTO: 3.2 10E9/L (ref 1.6–8.3)
NEUTROPHILS NFR BLD AUTO: 39.7 %
PLATELET # BLD AUTO: 416 10E9/L (ref 150–450)
POTASSIUM SERPL-SCNC: 4.4 MMOL/L (ref 3.4–5.3)
PROT SERPL-MCNC: 7.9 G/DL (ref 6.8–8.8)
RBC # BLD AUTO: 4.26 10E12/L (ref 3.8–5.2)
SODIUM SERPL-SCNC: 136 MMOL/L (ref 133–144)
WBC # BLD AUTO: 8.1 10E9/L (ref 4–11)

## 2021-06-28 PROCEDURE — 85025 COMPLETE CBC W/AUTO DIFF WBC: CPT | Performed by: PHYSICIAN ASSISTANT

## 2021-06-28 PROCEDURE — 258N000003 HC RX IP 258 OP 636: Performed by: FAMILY MEDICINE

## 2021-06-28 PROCEDURE — 74183 MRI ABD W/O CNTR FLWD CNTR: CPT

## 2021-06-28 PROCEDURE — A9585 GADOBUTROL INJECTION: HCPCS | Performed by: FAMILY MEDICINE

## 2021-06-28 PROCEDURE — 71260 CT THORAX DX C+: CPT

## 2021-06-28 PROCEDURE — 99000 SPECIMEN HANDLING OFFICE-LAB: CPT | Performed by: STUDENT IN AN ORGANIZED HEALTH CARE EDUCATION/TRAINING PROGRAM

## 2021-06-28 PROCEDURE — 250N000009 HC RX 250: Performed by: RADIOLOGY

## 2021-06-28 PROCEDURE — 255N000002 HC RX 255 OP 636: Performed by: FAMILY MEDICINE

## 2021-06-28 PROCEDURE — 82565 ASSAY OF CREATININE: CPT

## 2021-06-28 PROCEDURE — 250N000011 HC RX IP 250 OP 636: Performed by: RADIOLOGY

## 2021-06-28 PROCEDURE — 86301 IMMUNOASSAY TUMOR CA 19-9: CPT | Mod: 90 | Performed by: STUDENT IN AN ORGANIZED HEALTH CARE EDUCATION/TRAINING PROGRAM

## 2021-06-28 PROCEDURE — 36415 COLL VENOUS BLD VENIPUNCTURE: CPT | Performed by: PHYSICIAN ASSISTANT

## 2021-06-28 PROCEDURE — 80053 COMPREHEN METABOLIC PANEL: CPT | Performed by: PHYSICIAN ASSISTANT

## 2021-06-28 RX ORDER — GADOBUTROL 604.72 MG/ML
6 INJECTION INTRAVENOUS ONCE
Status: COMPLETED | OUTPATIENT
Start: 2021-06-28 | End: 2021-06-28

## 2021-06-28 RX ORDER — IOPAMIDOL 755 MG/ML
80 INJECTION, SOLUTION INTRAVASCULAR ONCE
Status: COMPLETED | OUTPATIENT
Start: 2021-06-28 | End: 2021-06-28

## 2021-06-28 RX ADMIN — SODIUM CHLORIDE 80 ML: 9 INJECTION, SOLUTION INTRAVENOUS at 10:08

## 2021-06-28 RX ADMIN — SODIUM CHLORIDE 50 ML: 9 INJECTION, SOLUTION INTRAVENOUS at 10:32

## 2021-06-28 RX ADMIN — GADOBUTROL 6 ML: 604.72 INJECTION INTRAVENOUS at 10:40

## 2021-06-28 RX ADMIN — IOPAMIDOL 80 ML: 755 INJECTION, SOLUTION INTRAVENOUS at 10:08

## 2021-06-28 NOTE — PROGRESS NOTES
Patient was seen at our lab for Cancer Antigen test, stated usually gets other labs done, extra tubes have been drawn. Please place future orders if needed. Thanks.

## 2021-06-29 LAB — CANCER AG19-9 SERPL-ACNC: 3 U/ML (ref 0–37)

## 2021-07-01 NOTE — PROGRESS NOTES
"Kitty is a 60 year old who is being evaluated via a billable video visit.      How would you like to obtain your AVS? MyChart  If the video visit is dropped, the invitation should be resent by: Send to e-mail at: [email or web address]  Will anyone else be joining your video visit? No       I have reviewed and updated the patient's allergies and medication list.    Concerns: none  Refills: none     Vitals - Patient Reported  Weight (Patient Reported): 60.8 kg (134 lb)  Height (Patient Reported): 165.1 cm (5' 5\")  BMI (Based on Pt Reported Ht/Wt): 22.3  Pain Score: No Pain (0)    Antonieta Gambino CMA        Video Start Time: 7:11 AM  Video-Visit Details    Type of service:  Video Visit    Video End Time:7:30 AM    Originating Location (pt. Location): Home    Distant Location (provider location):  [facility]     Platform used for Video Visit: [facility]      Oncology/Hematology Visit Note  Jul 6, 2021    Reason for Visit: Follow up of resectable pancreatic cancer    History of Present Illness: Staging CT chest/abd/pelvis on 12/9/17 showed several small pulmonary nodules (largest 3mm), unchanged mass in the pancreatic tail, mildly prominent mesenteric nodes thought likely reactive, and small right hepatic lobe hypodensities. Abdominal MRI on 12/10/17 showed hepatic hemangiomas, no evidence of metastatic disease to the liver.   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     Kitty met with Dr Monk and discussed neoadjuvant chemotherapy with FOLFIRINOX, then surgery and then adjuvant chemotherapy. She was seen on 1/15/18 for cycle 1 and then hospitalized for pancreatitis on 1/20/18 where she was managed with IV fluds and pain control. GI took her to the OR to attempt pancreatic duct stent placement but unfortunately it was completely obstructed. They were able to place a pancreatic stent and perform " an EUS cyst-gastrostomy with plans to repeat Xray a month following to ensure stent passage. She was d/c 1/27. Cycle 2 was given 2/5/18 (a week delayed due to hospitalization).     She went to ED on 2/11 with worsening abdominal pain and was worried about pancreatitis. W/u with CT and labs including lipase were negative. She had elevated WBC of 33K but had Neulasta on 2/8.     Cycle 3 was given on 2/20 with dose-reduction (10%) of oxaliplatin due to significant cold sensitivity and motor neuropathy. Complicated by bronchitis.    Cycle 4 was given 3/8/18. She went on to have distal pancreatotomy and splenectomy and cholecystectomy on 4/17/18. Final pathology showed complete pathologic response, 26 benign lymph nodes, and necrotizing pancreatitis.     She started adjuvant chemotherapy with Xeloda and gemcitabine 5/30/18. She had baseline CT on 5/22/18 showing no evidence of recurrent disease. Xeloda was dose-reduced with cycle 3 due to mucositis. Cycle 3 was also delayed a week. Cycle 4 was completed mid September 2018.     She has since been followed on active surveillance.     Interval History: Kitty is feeling okay today. She has not had really any new health changes since last visit. She continues to have intermittent cough with some PND and hoarseness. She also notes waxing and waning CASTILLO mostly with walking at an incline. No chest tightness, wheezing, fevers/chills, or CP. She continues to walk regularly. Appetite is good. Digestion is stable. Energy is good. She has had some difficulty sleeping with this appointment upcoming.     Current Outpatient Medications   Medication Sig Dispense Refill     acetaminophen (TYLENOL) 500 MG tablet Take 2 tablets (1,000 mg) by mouth every 8 hours 100 tablet 1     amylase-lipase-protease (CREON) 28848-86827 units CPEP per EC capsule TAKE 4 CAPSULES WITH MEALS, 2 CAPSULES WITH SNACKS, UP TO 16 CAPSULES PER DAY 1440 capsule 3     blood glucose (ONETOUCH VERIO IQ) test strip Use  to test blood sugar 4 times daily or as directed. 400 each 3     clobetasol (TEMOVATE) 0.05 % external ointment Apply topically 2 times daily 45 g 1     clobetasol (TEMOVATE) 0.05 % GEL topical gel Apply topically 2 times daily 15 g 1     gabapentin (NEURONTIN) 100 MG capsule Take 1 capsule (100 mg) by mouth 3 times daily 90 capsule 1     metFORMIN (GLUCOPHAGE-XR) 500 MG 24 hr tablet Take 1 tablet (500 mg) by mouth 2 times daily (with meals) 180 tablet 3     OneTouch Delica Lancets 33G MISC 4 lancets daily 150 each 3       Physical Examination:  There were no vitals taken for this visit.  Wt Readings from Last 10 Encounters:   03/16/21 62.2 kg (137 lb 2 oz)   01/18/21 62.6 kg (138 lb)   10/23/20 61.5 kg (135 lb 9.6 oz)   07/14/20 60.3 kg (133 lb)   05/05/20 60.3 kg (133 lb)   04/07/20 60.8 kg (134 lb)   12/20/19 61.5 kg (135 lb 8 oz)   11/19/19 62 kg (136 lb 11.2 oz)   10/24/19 61.2 kg (135 lb)   10/14/19 61.1 kg (134 lb 9.6 oz)   Video physical exam  General: Patient appears well in no acute distress.   Skin: No visualized rash or lesions on visualized skin  Eyes: EOMI, no erythema, sclera icterus or discharge noted  Resp: Appears to be breathing comfortably without accessory muscle usage, speaking in full sentences, no cough  MSK: Appears to have normal range of motion based on visualized movements  Neurologic: No apparent tremors, facial movements symmetric  Psych: affect bright, alert and oriented    The rest of a comprehensive physical examination is deferred due to PHE (public health emergency) video restrictions        Laboratory Data:   6/28/2021 09:44   Sodium 136   Potassium 4.4   Chloride 101   Carbon Dioxide 30   Urea Nitrogen 15   Creatinine 0.60   GFR Estimate >90   GFR Estimate If Black >90   Calcium 9.4   Anion Gap 5   Albumin 4.1   Protein Total 7.9   Bilirubin Total 0.8   Alkaline Phosphatase 79   ALT 21   AST 20   Cancer Antigen 19-9 3   Glucose 104 (H)   WBC 8.1   Hemoglobin 13.3   Hematocrit  41.0   Platelet Count 416   RBC Count 4.26   MCV 96   MCH 31.2   MCHC 32.4   RDW 13.3   Diff Method Automated Method   % Neutrophils 39.7   % Lymphocytes 43.9   % Monocytes 14.7   % Eosinophils 1.1   % Basophils 0.6   Absolute Neutrophil 3.2   Absolute Lymphocytes 3.6   Absolute Monocytes 1.2   Absolute Eosinophils 0.1   Absolute Basophils 0.1      4/13/2020 08:33 8/3/2020 09:55 12/8/2020 08:42 3/29/2021 09:10 6/28/2021 09:44   Cancer Antigen 19-9 3 3 3 2 3     Imaging: CT Chest 6/28  IMPRESSION:   1.  Interval enlargement in the left lower lobe spiculated mass  concerning for metastatic disease.  2.  The left axillary nodes now appear normal. These were likely  reactive.  3.  There are two low-dense lesions in the right lobe of the liver  which are indeterminate.    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  Although these are too small to characterize, they are likely benign  given their interval stability. Recommend continued attention on  follow-up.      Assessment and Plan:  1. Pancreatic cancer  S/p 4 cycles of neoadjuvant FOLFIRINOX with good response so she was able to undergo distal pancreatectomy and splenectomy. Her pathology showed complete pathologic response. She started adjuvant gem/xeloda and completed 4 cycles. All treatment completed mid-September 2018.      Reviewed her scans from last week showing likely hemangiomas in liver. CT chest shows increase in size of left lower lobe nodule. Her CA 19-9 remains normal. Would recommend evaluation in Pulmonary nodule clinic to discuss biopsy versus monitoring with interval scan.     35 minutes spent on the date of the encounter doing chart review, review of test results, interpretation of tests, patient visit  and documentation         Kellie Nobles PA-C

## 2021-07-06 ENCOUNTER — VIRTUAL VISIT (OUTPATIENT)
Dept: ONCOLOGY | Facility: CLINIC | Age: 61
End: 2021-07-06
Attending: PHYSICIAN ASSISTANT
Payer: COMMERCIAL

## 2021-07-06 DIAGNOSIS — R91.8 PULMONARY NODULES: ICD-10-CM

## 2021-07-06 DIAGNOSIS — C25.9 PANCREATIC ADENOCARCINOMA (H): Primary | ICD-10-CM

## 2021-07-06 PROCEDURE — 99214 OFFICE O/P EST MOD 30 MIN: CPT | Mod: 95 | Performed by: PHYSICIAN ASSISTANT

## 2021-07-06 PROCEDURE — 999N001193 HC VIDEO/TELEPHONE VISIT; NO CHARGE

## 2021-07-06 NOTE — LETTER
"    7/6/2021         RE: Kitty Bangura  [address] [address] [facility] [id number]        Dear Colleague,    Thank you for referring your patient, Kitty Bangura, to the [facility]. Please see a copy of my visit note below.    Kitty is a 60 year old who is being evaluated via a billable video visit.      How would you like to obtain your AVS? MyChart  If the video visit is dropped, the invitation should be resent by: Send to e-mail at: [email or web address]  Will anyone else be joining your video visit? No       I have reviewed and updated the patient's allergies and medication list.    Concerns: none  Refills: none     Vitals - Patient Reported  Weight (Patient Reported): 60.8 kg (134 lb)  Height (Patient Reported): 165.1 cm (5' 5\")  BMI (Based on Pt Reported Ht/Wt): 22.3  Pain Score: No Pain (0)    Antonieta Gambino CMA        Video Start Time: 7:11 AM  Video-Visit Details    Type of service:  Video Visit    Video End Time:7:30 AM    Originating Location (pt. Location): Home    Distant Location (provider location):  [facility]     Platform used for Video Visit: [facility]      Oncology/Hematology Visit Note  Jul 6, 2021    Reason for Visit: Follow up of resectable pancreatic cancer    History of Present Illness: Staging CT chest/abd/pelvis on 12/9/17 showed several small pulmonary nodules (largest 3mm), unchanged mass in the pancreatic tail, mildly prominent mesenteric nodes thought likely reactive, and small right hepatic lobe hypodensities. Abdominal MRI on 12/10/17 showed hepatic hemangiomas, no evidence of metastatic disease to the liver.   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     Kitty met with Dr Monk and discussed neoadjuvant chemotherapy with FOLFIRINOX, then surgery and then adjuvant chemotherapy. She was seen on 1/15/18 for cycle 1 and " then hospitalized for pancreatitis on 1/20/18 where she was managed with IV fluds and pain control. GI took her to the OR to attempt pancreatic duct stent placement but unfortunately it was completely obstructed. They were able to place a pancreatic stent and perform an EUS cyst-gastrostomy with plans to repeat Xray a month following to ensure stent passage. She was d/c 1/27. Cycle 2 was given 2/5/18 (a week delayed due to hospitalization).     She went to ED on 2/11 with worsening abdominal pain and was worried about pancreatitis. W/u with CT and labs including lipase were negative. She had elevated WBC of 33K but had Neulasta on 2/8.     Cycle 3 was given on 2/20 with dose-reduction (10%) of oxaliplatin due to significant cold sensitivity and motor neuropathy. Complicated by bronchitis.    Cycle 4 was given 3/8/18. She went on to have distal pancreatotomy and splenectomy and cholecystectomy on 4/17/18. Final pathology showed complete pathologic response, 26 benign lymph nodes, and necrotizing pancreatitis.     She started adjuvant chemotherapy with Xeloda and gemcitabine 5/30/18. She had baseline CT on 5/22/18 showing no evidence of recurrent disease. Xeloda was dose-reduced with cycle 3 due to mucositis. Cycle 3 was also delayed a week. Cycle 4 was completed mid September 2018.     She has since been followed on active surveillance.     Interval History: Kitty is feeling okay today. She has not had really any new health changes since last visit. She continues to have intermittent cough with some PND and hoarseness. She also notes waxing and waning CASTILLO mostly with walking at an incline. No chest tightness, wheezing, fevers/chills, or CP. She continues to walk regularly. Appetite is good. Digestion is stable. Energy is good. She has had some difficulty sleeping with this appointment upcoming.     Current Outpatient Medications   Medication Sig Dispense Refill     acetaminophen (TYLENOL) 500 MG tablet Take 2  tablets (1,000 mg) by mouth every 8 hours 100 tablet 1     amylase-lipase-protease (CREON) 28825-51924 units CPEP per EC capsule TAKE 4 CAPSULES WITH MEALS, 2 CAPSULES WITH SNACKS, UP TO 16 CAPSULES PER DAY 1440 capsule 3     blood glucose (ONETOUCH VERIO IQ) test strip Use to test blood sugar 4 times daily or as directed. 400 each 3     clobetasol (TEMOVATE) 0.05 % external ointment Apply topically 2 times daily 45 g 1     clobetasol (TEMOVATE) 0.05 % GEL topical gel Apply topically 2 times daily 15 g 1     gabapentin (NEURONTIN) 100 MG capsule Take 1 capsule (100 mg) by mouth 3 times daily 90 capsule 1     metFORMIN (GLUCOPHAGE-XR) 500 MG 24 hr tablet Take 1 tablet (500 mg) by mouth 2 times daily (with meals) 180 tablet 3     OneTouch Delica Lancets 33G MISC 4 lancets daily 150 each 3       Physical Examination:  There were no vitals taken for this visit.  Wt Readings from Last 10 Encounters:   03/16/21 62.2 kg (137 lb 2 oz)   01/18/21 62.6 kg (138 lb)   10/23/20 61.5 kg (135 lb 9.6 oz)   07/14/20 60.3 kg (133 lb)   05/05/20 60.3 kg (133 lb)   04/07/20 60.8 kg (134 lb)   12/20/19 61.5 kg (135 lb 8 oz)   11/19/19 62 kg (136 lb 11.2 oz)   10/24/19 61.2 kg (135 lb)   10/14/19 61.1 kg (134 lb 9.6 oz)   Video physical exam  General: Patient appears well in no acute distress.   Skin: No visualized rash or lesions on visualized skin  Eyes: EOMI, no erythema, sclera icterus or discharge noted  Resp: Appears to be breathing comfortably without accessory muscle usage, speaking in full sentences, no cough  MSK: Appears to have normal range of motion based on visualized movements  Neurologic: No apparent tremors, facial movements symmetric  Psych: affect bright, alert and oriented    The rest of a comprehensive physical examination is deferred due to PHE (public health emergency) video restrictions        Laboratory Data:   6/28/2021 09:44   Sodium 136   Potassium 4.4   Chloride 101   Carbon Dioxide 30   Urea Nitrogen 15    Creatinine 0.60   GFR Estimate >90   GFR Estimate If Black >90   Calcium 9.4   Anion Gap 5   Albumin 4.1   Protein Total 7.9   Bilirubin Total 0.8   Alkaline Phosphatase 79   ALT 21   AST 20   Cancer Antigen 19-9 3   Glucose 104 (H)   WBC 8.1   Hemoglobin 13.3   Hematocrit 41.0   Platelet Count 416   RBC Count 4.26   MCV 96   MCH 31.2   MCHC 32.4   RDW 13.3   Diff Method Automated Method   % Neutrophils 39.7   % Lymphocytes 43.9   % Monocytes 14.7   % Eosinophils 1.1   % Basophils 0.6   Absolute Neutrophil 3.2   Absolute Lymphocytes 3.6   Absolute Monocytes 1.2   Absolute Eosinophils 0.1   Absolute Basophils 0.1      4/13/2020 08:33 8/3/2020 09:55 12/8/2020 08:42 3/29/2021 09:10 6/28/2021 09:44   Cancer Antigen 19-9 3 3 3 2 3     Imaging: CT Chest 6/28  IMPRESSION:   1.  Interval enlargement in the left lower lobe spiculated mass  concerning for metastatic disease.  2.  The left axillary nodes now appear normal. These were likely  reactive.  3.  There are two low-dense lesions in the right lobe of the liver  which are indeterminate.    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  Although these are too small to characterize, they are likely benign  given their interval stability. Recommend continued attention on  follow-up.      Assessment and Plan:  1. Pancreatic cancer  S/p 4 cycles of neoadjuvant FOLFIRINOX with good response so she was able to undergo distal pancreatectomy and splenectomy. Her pathology showed complete pathologic response. She started adjuvant gem/xeloda and completed 4 cycles. All treatment completed mid-September 2018.      Reviewed her scans from last week showing likely hemangiomas in liver. CT chest shows increase in size of left lower lobe  nodule. Her CA 19-9 remains normal. Would recommend evaluation in Pulmonary nodule clinic to discuss biopsy versus monitoring with interval scan.     35 minutes spent on the date of the encounter doing chart review, review of test results, interpretation of tests, patient visit and documentation         Kellie Nobles PA-C                        Again, thank you for allowing me to participate in the care of your patient.        Sincerely,        Kellie Nobles PA-C

## 2021-07-09 ENCOUNTER — HOSPITAL ENCOUNTER (OUTPATIENT)
Dept: MAMMOGRAPHY | Facility: CLINIC | Age: 61
Discharge: HOME | End: 2021-07-09
Attending: FAMILY MEDICINE | Admitting: FAMILY MEDICINE
Payer: COMMERCIAL

## 2021-07-09 DIAGNOSIS — R91.1 NODULE OF LEFT LUNG: Primary | ICD-10-CM

## 2021-07-09 DIAGNOSIS — Z12.31 SCREENING MAMMOGRAM FOR HIGH-RISK PATIENT: ICD-10-CM

## 2021-07-09 PROCEDURE — 77063 BREAST TOMOSYNTHESIS BI: CPT

## 2021-07-09 NOTE — RESULT ENCOUNTER NOTE
Covering for provider:     Mammogram has returned satisfactory.     Repeat in 1 year.     Richie Mcelroy, DO

## 2021-07-20 ENCOUNTER — PREP FOR PROCEDURE (OUTPATIENT)
Dept: SURGERY | Facility: CLINIC | Age: 61
End: 2021-07-20

## 2021-07-20 ENCOUNTER — OFFICE VISIT (OUTPATIENT)
Dept: SURGERY | Facility: CLINIC | Age: 61
End: 2021-07-20
Attending: PHYSICIAN ASSISTANT
Payer: COMMERCIAL

## 2021-07-20 VITALS
BODY MASS INDEX: 22.34 KG/M2 | SYSTOLIC BLOOD PRESSURE: 98 MMHG | OXYGEN SATURATION: 96 % | DIASTOLIC BLOOD PRESSURE: 66 MMHG | TEMPERATURE: 98.5 F | WEIGHT: 138.4 LBS | HEART RATE: 61 BPM

## 2021-07-20 DIAGNOSIS — C25.9 PANCREATIC ADENOCARCINOMA (H): ICD-10-CM

## 2021-07-20 DIAGNOSIS — R91.8 PULMONARY NODULES: ICD-10-CM

## 2021-07-20 DIAGNOSIS — R91.1 NODULE OF LEFT LUNG: ICD-10-CM

## 2021-07-20 LAB
DLCOUNC-%PRED-PRE: 112 %
DLCOUNC-PRE: 24.13 ML/MIN/MMHG
DLCOUNC-PRED: 21.45 ML/MIN/MMHG
ERV-%PRED-PRE: 66 %
ERV-PRE: 0.66 L
ERV-PRED: 1 L
EXPTIME-PRE: 8.57 SEC
FEF2575-%PRED-POST: 69 %
FEF2575-%PRED-PRE: 42 %
FEF2575-POST: 1.58 L/SEC
FEF2575-PRE: 0.97 L/SEC
FEF2575-PRED: 2.28 L/SEC
FEFMAX-%PRED-PRE: 77 %
FEFMAX-PRE: 5.05 L/SEC
FEFMAX-PRED: 6.49 L/SEC
FEV1-%PRED-PRE: 72 %
FEV1-PRE: 1.89 L
FEV1FEV6-PRE: 64 %
FEV1FEV6-PRED: 80 %
FEV1FVC-PRE: 63 %
FEV1FVC-PRED: 79 %
FEV1SVC-PRE: 59 %
FEV1SVC-PRED: 75 %
FIFMAX-PRE: 4.06 L/SEC
FRCPLETH-%PRED-PRE: 125 %
FRCPLETH-PRE: 3.53 L
FRCPLETH-PRED: 2.82 L
FVC-%PRED-PRE: 90 %
FVC-PRE: 3 L
FVC-PRED: 3.33 L
IC-%PRED-PRE: 101 %
IC-PRE: 2.52 L
IC-PRED: 2.48 L
RVPLETH-%PRED-PRE: 141 %
RVPLETH-PRE: 2.87 L
RVPLETH-PRED: 2.03 L
TLCPLETH-%PRED-PRE: 114 %
TLCPLETH-PRE: 6.05 L
TLCPLETH-PRED: 5.27 L
VA-%PRED-PRE: 100 %
VA-PRE: 5.21 L
VC-%PRED-PRE: 91 %
VC-PRE: 3.18 L
VC-PRED: 3.48 L

## 2021-07-20 PROCEDURE — 94060 EVALUATION OF WHEEZING: CPT | Performed by: INTERNAL MEDICINE

## 2021-07-20 PROCEDURE — G0463 HOSPITAL OUTPT CLINIC VISIT: HCPCS

## 2021-07-20 PROCEDURE — 99205 OFFICE O/P NEW HI 60 MIN: CPT | Performed by: THORACIC SURGERY (CARDIOTHORACIC VASCULAR SURGERY)

## 2021-07-20 PROCEDURE — 94726 PLETHYSMOGRAPHY LUNG VOLUMES: CPT | Performed by: INTERNAL MEDICINE

## 2021-07-20 PROCEDURE — 94729 DIFFUSING CAPACITY: CPT | Performed by: INTERNAL MEDICINE

## 2021-07-20 RX ORDER — CEFAZOLIN SODIUM 2 G/50ML
2 SOLUTION INTRAVENOUS SEE ADMIN INSTRUCTIONS
Status: CANCELLED | OUTPATIENT
Start: 2021-07-20

## 2021-07-20 RX ORDER — CEFAZOLIN SODIUM 2 G/50ML
2 SOLUTION INTRAVENOUS
Status: CANCELLED | OUTPATIENT
Start: 2021-07-20

## 2021-07-20 ASSESSMENT — PAIN SCALES - GENERAL: PAINLEVEL: NO PAIN (0)

## 2021-07-20 NOTE — LETTER
2021         RE: Kitty Bangura  [address] [address] [facility] [address]      THORACIC SURGERY - NEW PATIENT OFFICE VISIT      Dear Dr. Mcgill,    I saw Ms. Kitty Bangura at Ms. Kellie Nobles s request in consultation for the evaluation and treatment of a pulmonary nodule.     HPI  Kitty Bangura is a 60 year old woman with a growing left lower lobe lung nodule in the setting of a treated pancreatic adenocarcinoma treated with chemotherapy and resection in .  She has a normal CA 19-9 level.  A new left lower lobe lung nodule was seen on a surveillance CT chest in 2021 and grew on a subsequent CT chest done 2021. She has had some increased dry cough that she noticed since March, as well as some shortness of breath that was not present prior to March when she walks up hills.   She is able to climb two flights of stairs without stopping due to shortness of breath. She is active and walks 2-3 miles a day.   She eats a diet which consists mostly of chicken, turkey, fish and vegetables.   Her weight has remained stable over the past several months.    She does not have any fever, chills, nausea, or vomiting.     Previsit Tests  CT Chest w/o contrast 2021:      Interval enlargement in the left lower lobe spiculated mass to 15x13 mm    CT Chest Abdomen Pelvis w/ Contrast 3/001047:     New 8 mm slightly lobulated nodule     CT chest 2020:        PMH    Past Medical History:   Diagnosis Date     Arthritis Post chemo    Suspected     Diabetes (H)      History of total splenectomy 2018     Necrotizing pancreatitis      Pancreatic cancer (H)      Pneumonia     2016     PONV (postoperative nausea and vomiting)         PSH    Past Surgical History:   Procedure Laterality Date     ABDOMEN SURGERY      Ruptured tubal pregnancies, additional surgeries followed     BACK SURGERY  2004    L5, S1 discectomy     BREAST SURGERY  2003    Breast reduction     C  DELIVERY ONLY       Description:  Section;  Recorded: 12/10/2009;     COLONOSCOPY  2008     COLONOSCOPY N/A 2018    Procedure: colonoscopy;  Surgeon: Lobito Marte MD;  Location: [facility]     ENDOSCOPIC RETROGRADE CHOLANGIOPANCREATOGRAM N/A 2018    Procedure: COMBINED ENDOSCOPIC RETROGRADE CHOLANGIOPANCREATOGRAPHY, PLACE TUBE/STENT;  Endoscopic retrograde cholangiopancreatogram with stent placement and cyst drainage bile ;  Surgeon: Vito Sanches MD;  Location: [facility]     ENDOSCOPIC ULTRASOUND LOWER GASTROINTESTIONAL TRACT (GI) N/A 2018    Procedure: ENDOSCOPIC ULTRASOUND LOWER GASTROINTESTIONAL TRACT (GI);  Endoscopic Ultrasound with guided Cyst-Gastrostomy;  Surgeon: González Metz MD;  Location: [facility]     ENDOSCOPIC ULTRASOUND, ESOPHAGOSCOPY, GASTROSCOPY, DUODENOSCOPY (EGD), NECROSECTOMY N/A 2017    Procedure: ENDOSCOPIC ULTRASOUND, ESOPHAGOSCOPY, GASTROSCOPY, DUODENOSCOPY (EGD), NECROSECTOMY;  Esophagogastroduodenoscopy, with  Necrosectomy and stents replacement  ;  Surgeon: González Metz MD;  Location: [facility]     ENDOSCOPIC ULTRASOUND, ESOPHAGOSCOPY, GASTROSCOPY, DUODENOSCOPY (EGD), NECROSECTOMY N/A 2017    Procedure: ENDOSCOPIC ULTRASOUND, ESOPHAGOSCOPY, GASTROSCOPY, DUODENOSCOPY (EGD), NECROSECTOMY;  Esophagogastroduodenoscopy with Necrosectomy, Balloon Dilation, stent removal X3 and Cystgastrostomy stent Placement X2;  Surgeon: Guru Cecilia Staples MD;  Location: [facility]     ESOPHAGOSCOPY, GASTROSCOPY, DUODENOSCOPY (EGD), COMBINED N/A 2017    Procedure: COMBINED ENDOSCOPIC ULTRASOUND, ESOPHAGOSCOPY, GASTROSCOPY, DUODENOSCOPY (EGD), FINE NEEDLE ASPIRATE/BIOPSY;  Endoscopic Ultrasound with cystgastrostomy and fine needle aspiration ;  Surgeon: González Metz MD;  Location: [facility]     ESOPHAGOSCOPY, GASTROSCOPY, DUODENOSCOPY (EGD), COMBINED N/A 2018    Procedure: COMBINED ESOPHAGOSCOPY, GASTROSCOPY, DUODENOSCOPY (EGD);  EGD;  Surgeon:  González Metz MD;  Location:  GI     ESOPHAGOSCOPY, GASTROSCOPY, DUODENOSCOPY (EGD), COMBINED N/A 2018    Procedure: COMBINED ESOPHAGOSCOPY, GASTROSCOPY, DUODENOSCOPY (EGD), REMOVE FOREIGN BODY;;  Surgeon: González Metz MD;  Location:  GI     GYN SURGERY  1983    Multiple ectopic pregnancies, reconnect,       INSERT PORT VASCULAR ACCESS Right 2018    Procedure: INSERT PORT VASCULAR ACCESS;  Chest Port Placement - right;  Surgeon: Chanda Lawson PA-C;  Location: [facility]     IR PORT REMOVAL RIGHT  2019     LAPAROSCOPY DIAGNOSTIC (GENERAL) N/A 2018    Procedure: LAPAROSCOPY DIAGNOSTIC (GENERAL);  Diagnostic Laparoscopy; Open Distal Pancreatectomy And Splenectomy, splenic flexure mobilization, cholecystectomy, intraoperative ultrasound;  Surgeon: Ja Byrd MD;  Location: [facility]     MAMMOPLASTY REDUCTION Bilateral 2006     PANCREATECTOMY, SPLENECTOMY N/A 2018    Procedure: PANCREATECTOMY, SPLENECTOMY;;  Surgeon: Ja Byrd MD;  Location:  OR     DE LAMNOTMY INCL W/DCMPRSN NRV ROOT 1 INTRSPC LUMBR      Description: Hemilaminectomy With Disc Removal One Lumbar Interspace;  Recorded: 2008;  Comments: Right L5-S1 with resultant loss of right patellar and achilles reflex     REMOVE PORT VASCULAR ACCESS Right 2019    Procedure: Right Port Removal;  Surgeon: Juan J Donaldson PA-C;  Location: [facility]        ETOH: Previous social drinker, hasn't drank since pancreatectomy in .  TOB: Smoked for 2-3 years when she was 15 or 16, smoked about a pack a week.     Physical examination  BMI: 22.13    BP 98/66   Pulse 61   Temp 98.5  F (36.9  C) (Oral)   Wt 62.8 kg (138 lb 6.4 oz)   SpO2 96%   BMI 22.34 kg/m     Constitutional: healthy, alert and no distress  Head: Normocephalic. No masses, lesions, tenderness or abnormalities  Neck: Neck supple. No adenopathy.  Cardiovascular: negative, PMI normal. No lifts, heaves, or thrills. RRR. No murmurs, clicks  gallops or rub  Respiratory: negative, Percussion normal. Good diaphragmatic excursion. Lungs clear  Psychiatric: mentation appears normal and affect normal/bright  Hematologic/Lymphatic/Immunologic: Normal cervical lymph nodes      From a personal perspective, she lives with her , Andrew, who is here with her today. She is a retired . She enjoys golfing.     IMPRESSION (R91.8) Pulmonary nodules  (C25.9) Pancreatic adenocarcinoma (H)      This is a 60 year-old woman with a growing left lower lobe lung nodule in the setting of treated pancreatic cancer and normal CA 19-9 levels. She is a good surgical candidate.      PLAN  I spent 45 min on the date of the encounter in chart review, patient visit, review of tests, documentation and/or discussion with other providers about the issues documented above. I reviewed the plan as follows:     Procedure planned: Robot assisted left thoracoscopic wedge possible lower lobectomy, mediastinal lymph node dissection  I discussed the risks and benefits of the operation, including obtaining a diagnosis, resecting and staging the cancer. The risks include bleeding, infection, arrhythmia requiring medication or anticoagulation, prolonged air leak, UTI, chylothorax, DVT and PE, and death.  There is also a risk of prolonged pain, which could require further treatment.  Prolonged air leak could be treated with bronchoscopy and endobronchial valve insertion.  Postoperative bleeding (rare) could require a return to the OR.   Consent: Pending    Necessary Preop Tests & Appointments: PET Scan; PCP for preoperative assessment.  Regional Anesthesia Plan: Intercostal nerve block administered by surgeon  Anticoagulation Plan: Lovenox preop    All questions were answered and Kitty Bangura and present family were in agreement with the plan.  I appreciate the opportunity to participate in the care of your patient and will keep you updated.  Sincerely,    René Phillips MD                René Phillips MD

## 2021-07-20 NOTE — PROGRESS NOTES
THORACIC SURGERY - NEW PATIENT OFFICE VISIT      Dear Dr. Mcgill,    I saw Ms. Kitty Bangura at Ms. Kellie Nobles s request in consultation for the evaluation and treatment of a pulmonary nodule.     HPI  Kitty Bangura is a 60 year old woman with a growing left lower lobe lung nodule in the setting of a treated pancreatic adenocarcinoma treated with chemotherapy and resection in .  She has a normal CA 19-9 level.  A new left lower lobe lung nodule was seen on a surveillance CT chest in 2021 and grew on a subsequent CT chest done 2021. She has had some increased dry cough that she noticed since March, as well as some shortness of breath that was not present prior to March when she walks up hills.   She is able to climb two flights of stairs without stopping due to shortness of breath. She is active and walks 2-3 miles a day.   She eats a diet which consists mostly of chicken, turkey, fish and vegetables.   Her weight has remained stable over the past several months.    She does not have any fever, chills, nausea, or vomiting.     Previsit Tests  CT Chest w/o contrast 2021:      Interval enlargement in the left lower lobe spiculated mass to 15x13 mm    CT Chest Abdomen Pelvis w/ Contrast 3/046626:     New 8 mm slightly lobulated nodule     CT chest 2020:        PMH    Past Medical History:   Diagnosis Date     Arthritis Post chemo    Suspected     Diabetes (H)      History of total splenectomy 2018     Necrotizing pancreatitis      Pancreatic cancer (H)      Pneumonia     2016     PONV (postoperative nausea and vomiting)         PSH    Past Surgical History:   Procedure Laterality Date     ABDOMEN SURGERY      Ruptured tubal pregnancies, additional surgeries followed     BACK SURGERY      L5, S1 discectomy     BREAST SURGERY      Breast reduction     C  DELIVERY ONLY      Description:  Section;  Recorded: 12/10/2009;     COLONOSCOPY        COLONOSCOPY N/A 12/11/2018    Procedure: colonoscopy;  Surgeon: Lobito Marte MD;  Location: [facility]     ENDOSCOPIC RETROGRADE CHOLANGIOPANCREATOGRAM N/A 1/25/2018    Procedure: COMBINED ENDOSCOPIC RETROGRADE CHOLANGIOPANCREATOGRAPHY, PLACE TUBE/STENT;  Endoscopic retrograde cholangiopancreatogram with stent placement and cyst drainage bile ;  Surgeon: Vito Sanches MD;  [facility]     ENDOSCOPIC ULTRASOUND LOWER GASTROINTESTIONAL TRACT (GI) N/A 1/25/2018    Procedure: ENDOSCOPIC ULTRASOUND LOWER GASTROINTESTIONAL TRACT (GI);  Endoscopic Ultrasound with guided Cyst-Gastrostomy;  Surgeon: González Metz MD;  [facility]     ENDOSCOPIC ULTRASOUND, ESOPHAGOSCOPY, GASTROSCOPY, DUODENOSCOPY (EGD), NECROSECTOMY N/A 12/4/2017    Procedure: ENDOSCOPIC ULTRASOUND, ESOPHAGOSCOPY, GASTROSCOPY, DUODENOSCOPY (EGD), NECROSECTOMY;  Esophagogastroduodenoscopy, with  Necrosectomy and stents replacement  ;  Surgeon: González Metz MD;  [facility]     ENDOSCOPIC ULTRASOUND, ESOPHAGOSCOPY, GASTROSCOPY, DUODENOSCOPY (EGD), NECROSECTOMY N/A 12/12/2017    Procedure: ENDOSCOPIC ULTRASOUND, ESOPHAGOSCOPY, GASTROSCOPY, DUODENOSCOPY (EGD), NECROSECTOMY;  Esophagogastroduodenoscopy with Necrosectomy, Balloon Dilation, stent removal X3 and Cystgastrostomy stent Placement X2;  Surgeon: Guru Cecilia Staples MD;  [facility]     ESOPHAGOSCOPY, GASTROSCOPY, DUODENOSCOPY (EGD), COMBINED N/A 11/29/2017    Procedure: COMBINED ENDOSCOPIC ULTRASOUND, ESOPHAGOSCOPY, GASTROSCOPY, DUODENOSCOPY (EGD), FINE NEEDLE ASPIRATE/BIOPSY;  Endoscopic Ultrasound with cystgastrostomy and fine needle aspiration ;  Surgeon: González Metz MD;  [facility]     ESOPHAGOSCOPY, GASTROSCOPY, DUODENOSCOPY (EGD), COMBINED N/A 5/8/2018    Procedure: COMBINED ESOPHAGOSCOPY, GASTROSCOPY, DUODENOSCOPY (EGD);  EGD;  Surgeon: González Metz MD;  [facility]     ESOPHAGOSCOPY, GASTROSCOPY, DUODENOSCOPY  (EGD), COMBINED N/A 2018    Procedure: COMBINED ESOPHAGOSCOPY, GASTROSCOPY, DUODENOSCOPY (EGD), REMOVE FOREIGN BODY;;  Surgeon: González Metz MD;  [facility]     GYN SURGERY  1983    Multiple ectopic pregnancies, reconnect,       INSERT PORT VASCULAR ACCESS Right 2018    Procedure: INSERT PORT VASCULAR ACCESS;  Chest Port Placement - right;  Surgeon: Chanda Lawson PA-C;  [facility]     IR PORT REMOVAL RIGHT  2019     LAPAROSCOPY DIAGNOSTIC (GENERAL) N/A 2018    Procedure: LAPAROSCOPY DIAGNOSTIC (GENERAL);  Diagnostic Laparoscopy; Open Distal Pancreatectomy And Splenectomy, splenic flexure mobilization, cholecystectomy, intraoperative ultrasound;  Surgeon: Ja Byrd MD;  [facility]     MAMMOPLASTY REDUCTION Bilateral 2006     PANCREATECTOMY, SPLENECTOMY N/A 2018    Procedure: PANCREATECTOMY, SPLENECTOMY;;  Surgeon: Ja Byrd MD;  [facility]     VT LAMNOTMY INCL W/DCMPRSN NRV ROOT 1 INTRSPC LUMBR      Description: Hemilaminectomy With Disc Removal One Lumbar Interspace;  Recorded: 2008;  Comments: Right L5-S1 with resultant loss of right patellar and achilles reflex     REMOVE PORT VASCULAR ACCESS Right 2019    Procedure: Right Port Removal;  Surgeon: Juan J Donaldson PA-C;  [facility]        ETOH: Previous social drinker, hasn't drank since pancreatectomy in .  TOB: Smoked for 2-3 years when she was 15 or 16, smoked about a pack a week.     Physical examination  BMI: 22.13    BP 98/66   Pulse 61   Temp 98.5  F (36.9  C) (Oral)   Wt 62.8 kg (138 lb 6.4 oz)   SpO2 96%   BMI 22.34 kg/m     Constitutional: healthy, alert and no distress  Head: Normocephalic. No masses, lesions, tenderness or abnormalities  Neck: Neck supple. No adenopathy.  Cardiovascular: negative, PMI normal. No lifts, heaves, or thrills. RRR. No murmurs, clicks gallops or rub  Respiratory: negative, Percussion normal. Good diaphragmatic excursion.  Lungs clear  Psychiatric: mentation appears normal and affect normal/bright  Hematologic/Lymphatic/Immunologic: Normal cervical lymph nodes      From a personal perspective, she lives with her , Andrew, who is here with her today. She is a retired . She enjoys golfing.     IMPRESSION (R91.8) Pulmonary nodules  (C25.9) Pancreatic adenocarcinoma (H)      This is a 60 year-old woman with a growing left lower lobe lung nodule in the setting of treated pancreatic cancer and normal CA 19-9 levels. She is a good surgical candidate.      PLAN  I spent 45 min on the date of the encounter in chart review, patient visit, review of tests, documentation and/or discussion with other providers about the issues documented above. I reviewed the plan as follows:     Procedure planned: Robot assisted left thoracoscopic wedge possible lower lobectomy, mediastinal lymph node dissection  I discussed the risks and benefits of the operation, including obtaining a diagnosis, resecting and staging the cancer. The risks include bleeding, infection, arrhythmia requiring medication or anticoagulation, prolonged air leak, UTI, chylothorax, DVT and PE, and death.  There is also a risk of prolonged pain, which could require further treatment.  Prolonged air leak could be treated with bronchoscopy and endobronchial valve insertion.  Postoperative bleeding (rare) could require a return to the OR.   Consent: Pending    Necessary Preop Tests & Appointments: PET Scan; PCP for preoperative assessment.  Regional Anesthesia Plan: Intercostal nerve block administered by surgeon  Anticoagulation Plan: Lovenox preop    All questions were answered and Kitty Bangura and present family were in agreement with the plan.  I appreciate the opportunity to participate in the care of your patient and will keep you updated.  Sincerely,    René Phillips MD

## 2021-07-20 NOTE — LETTER
7/20/2021         RE: Kitty Bangura  [id number] [facility] [id number]        Dear Colleague,    Thank you for referring your patient, Kitty Bangura, to the [facility]. Please see a copy of my visit note below.    THORACIC SURGERY - NEW PATIENT OFFICE VISIT      Dear Dr. Mcgill,    I saw Ms. Kitty Bangura at Ms. Kellie Nobles s request in consultation for the evaluation and treatment of a pulmonary nodule.     HPI  Kitty Bangura is a 60 year old woman with a growing left lower lobe lung nodule in the setting of a treated pancreatic adenocarcinoma treated with chemotherapy and resection in 2017.  She has a normal CA 19-9 level.  A new left lower lobe lung nodule was seen on a surveillance CT chest in March 2021 and grew on a subsequent CT chest done June 2021. She has had some increased dry cough that she noticed since March, as well as some shortness of breath that was not present prior to March when she walks up hills.   She is able to climb two flights of stairs without stopping due to shortness of breath. She is active and walks 2-3 miles a day.   She eats a diet which consists mostly of chicken, turkey, fish and vegetables.   Her weight has remained stable over the past several months.    She does not have any fever, chills, nausea, or vomiting.     Previsit Tests  CT Chest w/o contrast 6/28/2021:      Interval enlargement in the left lower lobe spiculated mass to 15x13 mm    CT Chest Abdomen Pelvis w/ Contrast 3/317885:     New 8 mm slightly lobulated nodule     CT chest 12/8/2020:        PMH    Past Medical History:   Diagnosis Date     Arthritis Post chemo    Suspected     Diabetes (H)      History of total splenectomy 04/17/2018     Necrotizing pancreatitis      Pancreatic cancer (H)      Pneumonia     2016     PONV (postoperative nausea and vomiting)         PSH    Past Surgical History:   Procedure Laterality Date     ABDOMEN SURGERY  1983     Ruptured tubal pregnancies, additional surgeries followed     BACK SURGERY  2004    L5, S1 discectomy     BREAST SURGERY      Breast reduction     C  DELIVERY ONLY      Description:  Section;  Recorded: 12/10/2009;     COLONOSCOPY       COLONOSCOPY N/A 2018    Procedure: colonoscopy;  Surgeon: Lobito Marte MD;  Location: [facility]     ENDOSCOPIC RETROGRADE CHOLANGIOPANCREATOGRAM N/A 2018    Procedure: COMBINED ENDOSCOPIC RETROGRADE CHOLANGIOPANCREATOGRAPHY, PLACE TUBE/STENT;  Endoscopic retrograde cholangiopancreatogram with stent placement and cyst drainage bile ;  Surgeon: Vito Sanches MD;  [facility]     ENDOSCOPIC ULTRASOUND LOWER GASTROINTESTIONAL TRACT (GI) N/A 2018    Procedure: ENDOSCOPIC ULTRASOUND LOWER GASTROINTESTIONAL TRACT (GI);  Endoscopic Ultrasound with guided Cyst-Gastrostomy;  Surgeon: González Metz MD;  [facility]     ENDOSCOPIC ULTRASOUND, ESOPHAGOSCOPY, GASTROSCOPY, DUODENOSCOPY (EGD), NECROSECTOMY N/A 2017    Procedure: ENDOSCOPIC ULTRASOUND, ESOPHAGOSCOPY, GASTROSCOPY, DUODENOSCOPY (EGD), NECROSECTOMY;  Esophagogastroduodenoscopy, with  Necrosectomy and stents replacement  ;  Surgeon: González Metz MD;  [facility]     ENDOSCOPIC ULTRASOUND, ESOPHAGOSCOPY, GASTROSCOPY, DUODENOSCOPY (EGD), NECROSECTOMY N/A 2017    Procedure: ENDOSCOPIC ULTRASOUND, ESOPHAGOSCOPY, GASTROSCOPY, DUODENOSCOPY (EGD), NECROSECTOMY;  Esophagogastroduodenoscopy with Necrosectomy, Balloon Dilation, stent removal X3 and Cystgastrostomy stent Placement X2;  Surgeon: Guru Cecilia Staples MD;  [facility]     ESOPHAGOSCOPY, GASTROSCOPY, DUODENOSCOPY (EGD), COMBINED N/A 2017    Procedure: COMBINED ENDOSCOPIC ULTRASOUND, ESOPHAGOSCOPY, GASTROSCOPY, DUODENOSCOPY (EGD), FINE NEEDLE ASPIRATE/BIOPSY;  Endoscopic Ultrasound with cystgastrostomy and fine needle aspiration ;  Surgeon: González Metz MD;   [facility]     ESOPHAGOSCOPY, GASTROSCOPY, DUODENOSCOPY (EGD), COMBINED N/A 2018    Procedure: COMBINED ESOPHAGOSCOPY, GASTROSCOPY, DUODENOSCOPY (EGD);  EGD;  Surgeon: González Metz MD;  [facility]     ESOPHAGOSCOPY, GASTROSCOPY, DUODENOSCOPY (EGD), COMBINED N/A 2018    Procedure: COMBINED ESOPHAGOSCOPY, GASTROSCOPY, DUODENOSCOPY (EGD), REMOVE FOREIGN BODY;;  Surgeon: González Metz MD;  [facility]     GYN SURGERY      Multiple ectopic pregnancies, reconnect,       INSERT PORT VASCULAR ACCESS Right 2018    Procedure: INSERT PORT VASCULAR ACCESS;  Chest Port Placement - right;  Surgeon: Chanda Lawson PA-C;  [facility]     IR PORT REMOVAL RIGHT  2019     LAPAROSCOPY DIAGNOSTIC (GENERAL) N/A 2018    Procedure: LAPAROSCOPY DIAGNOSTIC (GENERAL);  Diagnostic Laparoscopy; Open Distal Pancreatectomy And Splenectomy, splenic flexure mobilization, cholecystectomy, intraoperative ultrasound;  Surgeon: Ja Byrd MD;  [facility]     MAMMOPLASTY REDUCTION Bilateral 2006     PANCREATECTOMY, SPLENECTOMY N/A 2018    Procedure: PANCREATECTOMY, SPLENECTOMY;;  Surgeon: Ja Byrd MD;  [facility]     ME LAMNOTMY INCL W/DCMPRSN NRV ROOT 1 INTRSPC LUMBR      Description: Hemilaminectomy With Disc Removal One Lumbar Interspace;  Recorded: 2008;  Comments: Right L5-S1 with resultant loss of right patellar and achilles reflex     REMOVE PORT VASCULAR ACCESS Right 2019    Procedure: Right Port Removal;  Surgeon: Juan J Donaldson PA-C;  [facility]        ETOH: Previous social drinker, hasn't drank since pancreatectomy in 2017.  TOB: Smoked for 2-3 years when she was 15 or 16, smoked about a pack a week.     Physical examination  BMI: 22.13    BP 98/66   Pulse 61   Temp 98.5  F (36.9  C) (Oral)   Wt 62.8 kg (138 lb 6.4 oz)   SpO2 96%   BMI 22.34 kg/m     Constitutional: healthy, alert and no distress  Head:  Normocephalic. No masses, lesions, tenderness or abnormalities  Neck: Neck supple. No adenopathy.  Cardiovascular: negative, PMI normal. No lifts, heaves, or thrills. RRR. No murmurs, clicks gallops or rub  Respiratory: negative, Percussion normal. Good diaphragmatic excursion. Lungs clear  Psychiatric: mentation appears normal and affect normal/bright  Hematologic/Lymphatic/Immunologic: Normal cervical lymph nodes      From a personal perspective, she lives with her , Andrew, who is here with her today. She is a retired . She enjoys golfing.     IMPRESSION (R91.8) Pulmonary nodules  (C25.9) Pancreatic adenocarcinoma (H)      This is a 60 year-old woman with a growing left lower lobe lung nodule in the setting of treated pancreatic cancer and normal CA 19-9 levels. She is a good surgical candidate.      PLAN  I spent 45 min on the date of the encounter in chart review, patient visit, review of tests, documentation and/or discussion with other providers about the issues documented above. I reviewed the plan as follows:     Procedure planned: Robot assisted left thoracoscopic wedge possible lower lobectomy, mediastinal lymph node dissection  I discussed the risks and benefits of the operation, including obtaining a diagnosis, resecting and staging the cancer. The risks include bleeding, infection, arrhythmia requiring medication or anticoagulation, prolonged air leak, UTI, chylothorax, DVT and PE, and death.  There is also a risk of prolonged pain, which could require further treatment.  Prolonged air leak could be treated with bronchoscopy and endobronchial valve insertion.  Postoperative bleeding (rare) could require a return to the OR.   Consent: Pending    Necessary Preop Tests & Appointments: PET Scan; PCP for preoperative assessment.  Regional Anesthesia Plan: Intercostal nerve block administered by surgeon  Anticoagulation Plan: Lovenox preop    All questions were answered and Kitty Bangura and  present family were in agreement with the plan.  I appreciate the opportunity to participate in the care of your patient and will keep you updated.  Sincerely,    René Phillips MD             Again, thank you for allowing me to participate in the care of your patient.        Sincerely,        René Phillips MD

## 2021-07-21 ENCOUNTER — TELEPHONE (OUTPATIENT)
Dept: SURGERY | Facility: CLINIC | Age: 61
End: 2021-07-21

## 2021-07-21 DIAGNOSIS — C25.9 PANCREATIC ADENOCARCINOMA (H): Primary | ICD-10-CM

## 2021-07-21 DIAGNOSIS — Z11.59 ENCOUNTER FOR SCREENING FOR OTHER VIRAL DISEASES: ICD-10-CM

## 2021-07-21 DIAGNOSIS — R91.1 NODULE OF LEFT LUNG: ICD-10-CM

## 2021-07-21 NOTE — PROGRESS NOTES
Spoke with patient to schedule procedure with Dr. René Phillips    Procedure was scheduled on 07/28 at [facility] OR  Patient will have H&P with  [place]    Patient is aware a COVID-19 test is needed before their procedure. The test should be with-in 4 days of their procedure.   Test Details: Date 07/26 Location  [place]     Patient is aware a / is needed day of surgery.   Surgery letter was sent via [facility], patient has my direct contact information for any further questions.

## 2021-07-22 ENCOUNTER — HOSPITAL ENCOUNTER (OUTPATIENT)
Dept: PET IMAGING | Facility: CLINIC | Age: 61
Discharge: HOME | End: 2021-07-22
Attending: CLINICAL NURSE SPECIALIST | Admitting: CLINICAL NURSE SPECIALIST
Payer: COMMERCIAL

## 2021-07-22 DIAGNOSIS — R91.1 NODULE OF LEFT LUNG: ICD-10-CM

## 2021-07-22 DIAGNOSIS — C25.9 PANCREATIC ADENOCARCINOMA (H): ICD-10-CM

## 2021-07-22 PROCEDURE — 78816 PET IMAGE W/CT FULL BODY: CPT | Mod: PI

## 2021-07-22 PROCEDURE — 74177 CT ABD & PELVIS W/CONTRAST: CPT | Mod: 26 | Performed by: RADIOLOGY

## 2021-07-22 PROCEDURE — 250N000011 HC RX IP 250 OP 636: Performed by: CLINICAL NURSE SPECIALIST

## 2021-07-22 PROCEDURE — 78816 PET IMAGE W/CT FULL BODY: CPT | Mod: 26 | Performed by: RADIOLOGY

## 2021-07-22 PROCEDURE — A9552 F18 FDG: HCPCS | Performed by: CLINICAL NURSE SPECIALIST

## 2021-07-22 PROCEDURE — 343N000001 HC RX 343: Performed by: CLINICAL NURSE SPECIALIST

## 2021-07-22 PROCEDURE — 71260 CT THORAX DX C+: CPT | Mod: 26 | Performed by: RADIOLOGY

## 2021-07-22 PROCEDURE — 71260 CT THORAX DX C+: CPT

## 2021-07-22 RX ORDER — IOPAMIDOL 755 MG/ML
45-150 INJECTION, SOLUTION INTRAVASCULAR ONCE
Status: COMPLETED | OUTPATIENT
Start: 2021-07-22 | End: 2021-07-22

## 2021-07-22 RX ADMIN — IOPAMIDOL 82 ML: 755 INJECTION, SOLUTION INTRAVENOUS at 07:56

## 2021-07-22 RX ADMIN — FLUDEOXYGLUCOSE F-18 10.14 MCI.: 500 INJECTION, SOLUTION INTRAVENOUS at 07:52

## 2021-07-26 ENCOUNTER — LAB (OUTPATIENT)
Dept: LAB | Facility: CLINIC | Age: 61
End: 2021-07-26
Attending: THORACIC SURGERY (CARDIOTHORACIC VASCULAR SURGERY)

## 2021-07-26 ENCOUNTER — OFFICE VISIT (OUTPATIENT)
Dept: FAMILY MEDICINE | Facility: CLINIC | Age: 61
End: 2021-07-26
Payer: COMMERCIAL

## 2021-07-26 ENCOUNTER — LAB (OUTPATIENT)
Dept: LAB | Facility: CLINIC | Age: 61
End: 2021-07-26
Attending: THORACIC SURGERY (CARDIOTHORACIC VASCULAR SURGERY)
Payer: COMMERCIAL

## 2021-07-26 VITALS
HEART RATE: 69 BPM | WEIGHT: 135 LBS | DIASTOLIC BLOOD PRESSURE: 70 MMHG | TEMPERATURE: 97.7 F | HEIGHT: 66 IN | RESPIRATION RATE: 16 BRPM | SYSTOLIC BLOOD PRESSURE: 104 MMHG | OXYGEN SATURATION: 97 % | BODY MASS INDEX: 21.69 KG/M2

## 2021-07-26 DIAGNOSIS — Z01.818 PREOP GENERAL PHYSICAL EXAM: ICD-10-CM

## 2021-07-26 DIAGNOSIS — E11.9 TYPE 2 DIABETES MELLITUS WITHOUT COMPLICATION, WITHOUT LONG-TERM CURRENT USE OF INSULIN (H): ICD-10-CM

## 2021-07-26 DIAGNOSIS — Z01.818 PREOP GENERAL PHYSICAL EXAM: Primary | ICD-10-CM

## 2021-07-26 DIAGNOSIS — G47.01 INSOMNIA DUE TO MEDICAL CONDITION: ICD-10-CM

## 2021-07-26 DIAGNOSIS — Z11.59 ENCOUNTER FOR SCREENING FOR OTHER VIRAL DISEASES: ICD-10-CM

## 2021-07-26 PROBLEM — F10.20 ALCOHOL DEPENDENCE (H): Status: RESOLVED | Noted: 2021-03-16 | Resolved: 2021-07-26

## 2021-07-26 PROBLEM — K86.89 PANCREATIC MASS: Status: RESOLVED | Noted: 2017-11-01 | Resolved: 2021-07-26

## 2021-07-26 PROBLEM — L43.8 ORAL LICHEN PLANUS: Status: ACTIVE | Noted: 2019-12-01

## 2021-07-26 PROBLEM — Z90.81 ASPLENIA AFTER SURGICAL PROCEDURE: Status: ACTIVE | Noted: 2018-04-18

## 2021-07-26 PROBLEM — K85.90 ACUTE PANCREATITIS: Status: RESOLVED | Noted: 2017-11-01 | Resolved: 2021-07-26

## 2021-07-26 PROBLEM — K85.91 NECROTIZING PANCREATITIS: Status: RESOLVED | Noted: 2017-12-09 | Resolved: 2021-07-26

## 2021-07-26 PROBLEM — D25.9 LEIOMYOMA OF UTERUS, UNSPECIFIED: Status: ACTIVE | Noted: 2021-07-26

## 2021-07-26 LAB
ABO/RH(D): NORMAL
ANTIBODY SCREEN: NEGATIVE
ERYTHROCYTE [DISTWIDTH] IN BLOOD BY AUTOMATED COUNT: 13.1 % (ref 10–15)
HBA1C MFR BLD: 6.4 % (ref 0–5.6)
HCT VFR BLD AUTO: 37.9 % (ref 35–47)
HGB BLD-MCNC: 12.8 G/DL (ref 11.7–15.7)
MCH RBC QN AUTO: 31.2 PG (ref 26.5–33)
MCHC RBC AUTO-ENTMCNC: 33.8 G/DL (ref 31.5–36.5)
MCV RBC AUTO: 92 FL (ref 78–100)
PLATELET # BLD AUTO: 407 10E3/UL (ref 150–450)
RBC # BLD AUTO: 4.1 10E6/UL (ref 3.8–5.2)
SPECIMEN EXPIRATION DATE: NORMAL
WBC # BLD AUTO: 10 10E3/UL (ref 4–11)

## 2021-07-26 PROCEDURE — 86901 BLOOD TYPING SEROLOGIC RH(D): CPT | Mod: 90 | Performed by: PATHOLOGY

## 2021-07-26 PROCEDURE — U0005 INFEC AGEN DETEC AMPLI PROBE: HCPCS | Mod: 90 | Performed by: PATHOLOGY

## 2021-07-26 PROCEDURE — 83036 HEMOGLOBIN GLYCOSYLATED A1C: CPT | Performed by: PATHOLOGY

## 2021-07-26 PROCEDURE — U0003 INFECTIOUS AGENT DETECTION BY NUCLEIC ACID (DNA OR RNA); SEVERE ACUTE RESPIRATORY SYNDROME CORONAVIRUS 2 (SARS-COV-2) (CORONAVIRUS DISEASE [COVID-19]), AMPLIFIED PROBE TECHNIQUE, MAKING USE OF HIGH THROUGHPUT TECHNOLOGIES AS DESCRIBED BY CMS-2020-01-R: HCPCS | Mod: 90 | Performed by: PATHOLOGY

## 2021-07-26 PROCEDURE — 85027 COMPLETE CBC AUTOMATED: CPT | Performed by: PATHOLOGY

## 2021-07-26 PROCEDURE — 36415 COLL VENOUS BLD VENIPUNCTURE: CPT | Performed by: PATHOLOGY

## 2021-07-26 PROCEDURE — 99214 OFFICE O/P EST MOD 30 MIN: CPT | Performed by: NURSE PRACTITIONER

## 2021-07-26 PROCEDURE — 86900 BLOOD TYPING SEROLOGIC ABO: CPT | Mod: 90 | Performed by: PATHOLOGY

## 2021-07-26 PROCEDURE — 86850 RBC ANTIBODY SCREEN: CPT | Mod: 90 | Performed by: PATHOLOGY

## 2021-07-26 RX ORDER — TRAZODONE HYDROCHLORIDE 50 MG/1
50-100 TABLET, FILM COATED ORAL AT BEDTIME
Qty: 60 TABLET | Refills: 3 | Status: SHIPPED | OUTPATIENT
Start: 2021-07-26 | End: 2021-08-11

## 2021-07-26 ASSESSMENT — MIFFLIN-ST. JEOR: SCORE: 1189.11

## 2021-07-26 NOTE — PATIENT INSTRUCTIONS
1.  Hold metformin the morning of surgery.  Restart when you start eating again.    Preparing for Your Surgery  Getting started  A nurse will call you to review your health history and instructions. They will give you an arrival time based on your scheduled surgery time.  Please be ready to share the following:    Your doctor's clinic name and phone number    Your medical, surgical and anesthesia history    A list of allergies and sensitivities    A list of medicines, including herbal treatments and over-the-counter drugs    Whether the patient has a legal guardian (ask how to send us the papers in advance)  If you have a child who's having surgery, please ask for a copy of Preparing for Your Child's Surgery.    Preparing for surgery    Within 30 days of surgery: Have a pre-op exam (sometimes called an H&P, or History and Physical). This can be done at a clinic or pre-operative center.  ? If you're having a , you may not need this exam. Talk to your care team    At your pre-op exam, talk to your care team about all medicines you take. If you need to stop any medicines before surgery, ask when to start taking them again.  ? We do this for your safety. Many medicines can make you bleed too much during surgery. Some change how well surgery (anesthesia) drugs work.    Call your insurance company to let them know you're having surgery. (If you don't have insurance, call [phone].)    Call your clinic if there's any change in your health. This includes signs of a cold or flu (sore throat, runny nose, cough, rash, fever). It also includes a scrape or scratch near the surgery site.    If you have questions on the day of surgery, call your hospital or surgery center.  Eating and drinking guidelines  For your safety: Unless your surgeon tells you otherwise, follow the guidelines below.    Eat and drink as usual until 8 hours before surgery. After that, no food or milk.    Drink clear liquids until 2 hours before  surgery. These are liquids you can see through, like water, Gatorade and Propel Water. You may also have black coffee and tea (no cream or milk).    Nothing by mouth within 2 hours of surgery. This includes gum, candy and breath mints.    If you drink, stop drinking alcohol the night before surgery.    If your care team tells you to take medicine on the morning of surgery, it's okay to take it with a sip of water.  Preventing infection    Shower or bathe the night before and morning of your surgery. Follow the instructions your clinic gave you. (If no instructions, use regular soap.)    Don't shave or clip hair near your surgery site. We'll remove the hair if needed.    Don't smoke or vape the morning of surgery. You may chew nicotine gum up to 2 hours before surgery. A nicotine patch is okay.  ? Note: Some surgeries require you to completely quit smoking and nicotine. Check with your surgeon.    Your care team will make every effort to keep you safe from infection. We will:  ? Clean our hands often with soap and water (or an alcohol-based hand rub).  ? Clean the skin at your surgery site with a special soap that kills germs.  ? Give you a special gown to keep you warm. (Cold raises the risk of infection.)  ? Wear special hair covers, masks, gowns and gloves during surgery.  ? Give antibiotic medicine, if prescribed. Not all surgeries need antibiotics.  What to bring on the day of surgery    Photo ID and insurance card    Copy of your health care directive, if you have one    Glasses and hearing aides (bring cases)  ? You can't wear contacts during surgery    Inhaler and eye drops, if you use them (tell us about these when you arrive)    CPAP machine or breathing device, if you use them    A few personal items, if spending the night    If you have . . .  ? A pacemaker or ICD (cardiac defibrillator): Bring the ID card.  ? An implanted stimulator: Bring the remote control.  ? A legal guardian: Bring a copy of the  certified (court-stamped) guardianship papers.  Please remove any jewelry, including body piercings. Leave jewelry and other valuables at home.  If you're going home the day of surgery  Important: If you don't follow the rules below, we must cancel your surgery.     Arrange for someone to drive you home after surgery. You may not drive, take a taxi or take public transportation by yourself (unless you'll have local anesthesia only).    Arrange for a responsible adult to stay with you overnight. If you don't, we may keep you in the hospital overnight, and you may need to pay the costs yourself.  Questions?   If you have any questions for your care team, list them here: _________________________________________________________________________________________________________________________________________________________________________________________________________________________________________________________________________________________________________________________  For informational purposes only. Not to replace the advice of your health care provider. Copyright   2003, 2019 [employer]. All rights reserved. Clinically reviewed by Rosa Maya MD. [employer] [id number] - REV 4/20.

## 2021-07-26 NOTE — PROGRESS NOTES
[facility]  [address] [address]  Phone: [phone]  Primary Provider: Solange Mcgill  Pre-op Performing Provider: TRACEY JAIMES    PREOPERATIVE EVALUATION:  Today's date: 7/26/2021    Kitty Bangura is a 60 year old female who presents for a preoperative evaluation.    Surgical Information:  Surgery/Procedure: Lobectomy - lower left  Surgery Location: [facility]  Surgeon: Dr. Phillips  Surgery Date: 07/28/21  Time of Surgery: am  Where patient plans to recover: At home with family  Fax number for surgical facility: Note does not need to be faxed, will be available electronically in Epic. - [phone]    Type of Anesthesia Anticipated: General    Assessment & Plan     The proposed surgical procedure is considered INTERMEDIATE risk.    Preop general physical exam  Ordered labs for A1C and CBC for surgery.  Patient is having labs drawn down at the [facility] today and will get these completed also at that time for one draw.  No EKG required due to no symptoms or cardiac history.  - Hemoglobin A1c; Future  - CBC with platelets; Future    Lung nodule < 6cm on CT  Patient is undergoing lobectomy for removal of lung nodule.    Type 2 diabetes mellitus without complication, without long-term current use of insulin (H)  Stable.  - Hemoglobin A1c; Future    Insomnia  Ordered Trazodone  mg at bedtime as needed since she is having issues with sleep.    Risks and Recommendations:  The patient has the following additional risks and recommendations for perioperative complications:  Diabetes:  - Patient is not on insulin therapy: regular NPO guidelines can be followed.   Anemia/Bleeding/Clotting:    - in the past, CBC ordered; one month ago Hgb was normal.    Medication Instructions:   - metformin: HOLD day of surgery.    RECOMMENDATION:  APPROVAL GIVEN to proceed with proposed procedure.    Subjective     HPI related to upcoming procedure: Nodule  noted on CT in the left lower lung.  Undergoing lobectomy for removal.    Preop Questions 7/22/2021   1. Have you ever had a heart attack or stroke? No   2. Have you ever had surgery on your heart or blood vessels, such as a stent placement, a coronary artery bypass, or surgery on an artery in your head, neck, heart, or legs? No   3. Do you have chest pain with activity? No   4. Do you have a history of  heart failure? No   5. Do you currently have a cold, bronchitis or symptoms of other infection? No   6. Do you have a cough, shortness of breath, or wheezing? YES - constant, dry; taking deep breath makes her cough; shortness of breath since March with walking   7. Do you or anyone in your family have previous history of blood clots? No   8. Do you or does anyone in your family have a serious bleeding problem such as prolonged bleeding following surgeries or cuts? No   9. Have you ever had problems with anemia or been told to take iron pills? Yes, uncertain of cause; recent labs normal   10. Have you had any abnormal blood loss such as black, tarry or bloody stools, or abnormal vaginal bleeding? No   11. Have you ever had a blood transfusion? Yes, with last surgery to remove pancreas, spleen and gallbladder in 2018   12. Are you willing to have a blood transfusion if it is medically needed before, during, or after your surgery? Yes   13. Have you or any of your relatives ever had problems with anesthesia? YES - nausea and vomiting with anesthesia with her and other family members   14. Do you have sleep apnea, excessive snoring or daytime drowsiness? No   15. Do you have any artifical heart valves or other implanted medical devices like a pacemaker, defibrillator, or continuous glucose monitor? No   16. Do you have artificial joints? No   17. Are you allergic to latex? No     Health Care Directive:  Patient has a Health Care Directive on file    Preoperative Review of :   reviewed - no record of controlled  substances prescribed.    Status of Chronic Conditions:  DIABETES - Patient has a longstanding history of DiabetesType Type II . Patient is being treated with diet, oral agents and exercise and denies significant side effects. Control has been good. Complicating factors include but are not limited to: neuropathy.       Review of Systems  CONSTITUTIONAL: NEGATIVE for fever, chills, change in weight  INTEGUMENTARY/SKIN: NEGATIVE for worrisome rashes, moles or lesions  EYES: NEGATIVE for vision changes or irritation  EYES: POSITIVE for occasional pain in eye, but is not constant; possible trigeminal nerve issue per patient  ENT/MOUTH: NEGATIVE for ear, mouth and throat problems  ENT/MOUTH: POSITIVE for oral lesions that recur in the mouth since chemo, treated with clobetasol gel as needed  RESP:POSITIVE for cough-non productive and dyspnea on exertion  BREAST: NEGATIVE for masses, tenderness or discharge  CV: NEGATIVE for chest pain, palpitations or peripheral edema  GI: NEGATIVE for nausea, abdominal pain, heartburn, or change in bowel habits  GI: POSITIVE for nausea and slight GERD symptoms; feels that this is from anxiety due to upcoming surgery  : NEGATIVE for frequency, dysuria, or hematuria  MUSCULOSKELETAL: NEGATIVE for significant arthralgias or myalgia  MUSCULOSKELETAL:POSITIVE  for back pain low that has been chronic and treated by chiropractor, knees also are cold when sitting and warm up when moving  NEURO: NEGATIVE for weakness, dizziness or paresthesias  NEURO: POSITIVE for neuropathy in knees hands and feet  ENDOCRINE: NEGATIVE for temperature intolerance, skin/hair changes  HEME: NEGATIVE for bleeding problems  PSYCHIATRIC: POSITIVE foranxiety and secondary to upcoming procedure    Patient is also having issues with sleeping due to stress.  She is asking for something to assist her at night to sleep.  She has tried tylenol PM and OTC things for sleep which have not been helpful.    Patient Active  Problem List    Diagnosis Date Noted     Leiomyoma of uterus, unspecified 2021     Priority: Medium     Formatting of this note might be different from the original.  Created by Conversion       Lung nodule < 6cm on CT 2021     Priority: Medium     Added automatically from request for surgery [id number]       Oral lichen planus 2019     Priority: Medium     Drug-induced polyneuropathy (H) 10/14/2019     Priority: Medium     Type 2 diabetes mellitus without complication, without long-term current use of insulin (H) 10/05/2018     Priority: Medium     Graduated from Endocrine Clinic 2019       Anemia 2018     Priority: Medium     Thrombocytopenia (H) 2018     Priority: Medium     Asplenia after surgical procedure 2018     Priority: Medium     Pancreatic adenocarcinoma (H) 2017     Priority: Medium     Leukocytosis 2017     Priority: Medium     Fatty liver 2017     Priority: Medium     Seen on US 2017        Past Medical History:   Diagnosis Date     Arthritis Post chemo    Suspected     Diabetes (H)      History of total splenectomy 2018     Necrotizing pancreatitis      Necrotizing pancreatitis 2017     Pancreatic cancer (H)      Pancreatic mass 2017    On CT abd/pelvis: 3 cm heterogeneous mass within the tail of the pancreas. A few calcifications are noted along the lateral margin of the mass.     Pneumonia     2016     PONV (postoperative nausea and vomiting)      Past Surgical History:   Procedure Laterality Date     ABDOMEN SURGERY      Ruptured tubal pregnancies, additional surgeries followed     BACK SURGERY  2004    L5, S1 discectomy     BREAST SURGERY      Breast reduction     C  DELIVERY ONLY      Description:  Section;  Recorded: 12/10/2009;     COLONOSCOPY       COLONOSCOPY N/A 2018    Procedure: colonoscopy;  Surgeon: Lobito Marte MD;  Location: [facility]     ENDOSCOPIC RETROGRADE  CHOLANGIOPANCREATOGRAM N/A 1/25/2018    Procedure: COMBINED ENDOSCOPIC RETROGRADE CHOLANGIOPANCREATOGRAPHY, PLACE TUBE/STENT;  Endoscopic retrograde cholangiopancreatogram with stent placement and cyst drainage bile ;  Surgeon: Vito Sanches MD;  Location: [facility]     ENDOSCOPIC ULTRASOUND LOWER GASTROINTESTIONAL TRACT (GI) N/A 1/25/2018    Procedure: ENDOSCOPIC ULTRASOUND LOWER GASTROINTESTIONAL TRACT (GI);  Endoscopic Ultrasound with guided Cyst-Gastrostomy;  Surgeon: González Metz MD;  Location: [facility]     ENDOSCOPIC ULTRASOUND, ESOPHAGOSCOPY, GASTROSCOPY, DUODENOSCOPY (EGD), NECROSECTOMY N/A 12/4/2017    Procedure: ENDOSCOPIC ULTRASOUND, ESOPHAGOSCOPY, GASTROSCOPY, DUODENOSCOPY (EGD), NECROSECTOMY;  Esophagogastroduodenoscopy, with  Necrosectomy and stents replacement  ;  Surgeon: González Metz MD;  Location: [facility]     ENDOSCOPIC ULTRASOUND, ESOPHAGOSCOPY, GASTROSCOPY, DUODENOSCOPY (EGD), NECROSECTOMY N/A 12/12/2017    Procedure: ENDOSCOPIC ULTRASOUND, ESOPHAGOSCOPY, GASTROSCOPY, DUODENOSCOPY (EGD), NECROSECTOMY;  Esophagogastroduodenoscopy with Necrosectomy, Balloon Dilation, stent removal X3 and Cystgastrostomy stent Placement X2;  Surgeon: Guru Cecilia Staples MD;  Location: [facility]     ESOPHAGOSCOPY, GASTROSCOPY, DUODENOSCOPY (EGD), COMBINED N/A 11/29/2017    Procedure: COMBINED ENDOSCOPIC ULTRASOUND, ESOPHAGOSCOPY, GASTROSCOPY, DUODENOSCOPY (EGD), FINE NEEDLE ASPIRATE/BIOPSY;  Endoscopic Ultrasound with cystgastrostomy and fine needle aspiration ;  Surgeon: González Metz MD;  Location: [facility]     ESOPHAGOSCOPY, GASTROSCOPY, DUODENOSCOPY (EGD), COMBINED N/A 5/8/2018    Procedure: COMBINED ESOPHAGOSCOPY, GASTROSCOPY, DUODENOSCOPY (EGD);  EGD;  Surgeon: González Metz MD;  Location: [facility]     ESOPHAGOSCOPY, GASTROSCOPY, DUODENOSCOPY (EGD), COMBINED N/A 5/8/2018    Procedure: COMBINED ESOPHAGOSCOPY, GASTROSCOPY, DUODENOSCOPY (EGD), REMOVE FOREIGN BODY;;   Surgeon: González Metz MD;  Location: [facility]     GYN SURGERY      Multiple ectopic pregnancies, reconnect,       INSERT PORT VASCULAR ACCESS Right 2018    Procedure: INSERT PORT VASCULAR ACCESS;  Chest Port Placement - right;  Surgeon: Chanda Lawson PA-C;  Location: [facility]     IR PORT REMOVAL RIGHT  2019     LAPAROSCOPY DIAGNOSTIC (GENERAL) N/A 2018    Procedure: LAPAROSCOPY DIAGNOSTIC (GENERAL);  Diagnostic Laparoscopy; Open Distal Pancreatectomy And Splenectomy, splenic flexure mobilization, cholecystectomy, intraoperative ultrasound;  Surgeon: Ja Byrd MD;  Location: [facility]     MAMMOPLASTY REDUCTION Bilateral 2006     PANCREATECTOMY, SPLENECTOMY N/A 2018    Procedure: PANCREATECTOMY, SPLENECTOMY;;  Surgeon: Ja Byrd MD;  Location: [facility]     IA LAMNOTMY INCL W/DCMPRSN NRV ROOT 1 INTRSPC LUMBR      Description: Hemilaminectomy With Disc Removal One Lumbar Interspace;  Recorded: 2008;  Comments: Right L5-S1 with resultant loss of right patellar and achilles reflex     REMOVE PORT VASCULAR ACCESS Right 2019    Procedure: Right Port Removal;  Surgeon: Juan J Donaldson PA-C;  Location: [facility]     Current Outpatient Medications   Medication Sig Dispense Refill     acetaminophen (TYLENOL) 500 MG tablet Take 2 tablets (1,000 mg) by mouth every 8 hours 100 tablet 1     amylase-lipase-protease (CREON) 13653-49354 units CPEP per EC capsule TAKE 4 CAPSULES WITH MEALS, 2 CAPSULES WITH SNACKS, UP TO 16 CAPSULES PER DAY 1440 capsule 3     blood glucose (ONETOUCH VERIO IQ) test strip Use to test blood sugar 4 times daily or as directed. 400 each 3     clobetasol (TEMOVATE) 0.05 % external ointment Apply topically 2 times daily 45 g 1     clobetasol (TEMOVATE) 0.05 % GEL topical gel Apply topically 2 times daily 15 g 1     gabapentin (NEURONTIN) 100 MG capsule Take 1 capsule (100 mg) by mouth 3 times daily 90 capsule 1     metFORMIN (GLUCOPHAGE-XR) 500 MG 24 hr  "tablet Take 1 tablet (500 mg) by mouth 2 times daily (with meals) 180 tablet 3     OneTouch Delica Lancets 33G MISC 4 lancets daily 150 each 3     traZODone (DESYREL) 50 MG tablet Take 1-2 tablets ( mg) by mouth At Bedtime 60 tablet 3       No Known Allergies     Social History     Tobacco Use     Smoking status: Former Smoker     Packs/day: 0.50     Years: 5.00     Pack years: 2.50     Types: Cigarettes     Start date: 1974     Quit date:      Years since quittin.5     Smokeless tobacco: Never Used   Substance Use Topics     Alcohol use: No     Alcohol/week: 21.0 standard drinks     Comment: Now quit since Oct 31.  Moderate drinker in past     Family History   Problem Relation Age of Onset     Heart Disease Father      Hyperlipidemia Father      Heart Disease Brother      Diabetes Mother      Hypertension Mother      Obesity Mother      Depression Mother      Diabetes Maternal Grandfather      Hypertension Maternal Grandfather      Obesity Maternal Grandfather      Diabetes Sister      Hypertension Sister      Depression Sister      Anxiety Disorder Sister      Obesity Sister      Hypertension Brother      Hyperlipidemia Brother      Heart Disease Brother      Breast Cancer Cousin      Breast Cancer Cousin      Breast Cancer Cousin      Colon Cancer Other      Other Cancer Other         Mesothelioma     Depression Sister      Anxiety Disorder Brother      Cancer Brother 64        colon cancer     Anxiety Disorder Son      Depression Son      Mental Illness Sister         Schizophrenia     Hypertension Sister      Obesity Maternal Grandmother      Obesity Sister      Diabetes Nephew      Hypertension Brother      History   Drug Use No         Objective     /70 (BP Location: Left arm, Patient Position: Sitting, Cuff Size: Adult Large)   Pulse 69   Temp 97.7  F (36.5  C) (Tympanic)   Resp 16   Ht 1.676 m (5' 6\")   Wt 61.2 kg (135 lb)   SpO2 97%   Breastfeeding No   BMI 21.79 kg/m  "     Physical Exam    GENERAL APPEARANCE: healthy, alert and no distress     EYES: EOMI, PERRL     HENT: ear canals and TM's normal and nose and mouth without ulcers or lesions     NECK: no adenopathy, no asymmetry, masses, or scars and thyroid normal to palpation     RESP: lungs clear to auscultation - no rales, rhonchi or wheezes     CV: regular rates and rhythm, normal S1 S2, no S3 or S4 and no murmur, click or rub     ABDOMEN:  soft, nontender, no HSM or masses and bowel sounds normal     MS: extremities normal- no gross deformities noted, no evidence of inflammation in joints, FROM in all extremities.     SKIN: no suspicious lesions or rashes     NEURO: Normal strength and tone, sensory exam grossly normal, mentation intact and speech normal     PSYCH: mentation appears normal. and affect normal/bright     LYMPHATICS: No cervical adenopathy    Recent Labs   Lab Test 06/28/21  0944 03/29/21  0910 10/23/20  1028 10/23/20  1020 10/14/19  0908   HGB 13.3 12.9   < >  --   --     447   < >  --   --     134  --   --   --    POTASSIUM 4.4 4.3  --   --   --    CR 0.60 0.60  --   --   --    A1C  --   --   --  6.2* 6.7*    < > = values in this interval not displayed.        Diagnostics:  Labs pending at this time.  Results will be reviewed when available.   No EKG required, no history of coronary heart disease, significant arrhythmia, peripheral arterial disease or other structural heart disease.    Lab Results   Component Value Date    A1C 6.4 07/26/2021    A1C 6.2 10/23/2020    A1C 6.7 10/14/2019    A1C 7.8 05/30/2018    A1C 5.5 04/17/2018    A1C 5.6 11/27/2017     Lab Results   Component Value Date    WBC 10.0 07/26/2021    WBC 8.1 06/28/2021     Lab Results   Component Value Date    RBC 4.10 07/26/2021    RBC 4.26 06/28/2021     Lab Results   Component Value Date    HGB 12.8 07/26/2021    HGB 13.3 06/28/2021     Lab Results   Component Value Date    HCT 37.9 07/26/2021    HCT 41.0 06/28/2021     No  components found for: MCT  Lab Results   Component Value Date    MCV 92 07/26/2021    MCV 96 06/28/2021     Lab Results   Component Value Date    MCH 31.2 07/26/2021    MCH 31.2 06/28/2021     Lab Results   Component Value Date    MCHC 33.8 07/26/2021    MCHC 32.4 06/28/2021     Lab Results   Component Value Date    RDW 13.1 07/26/2021    RDW 13.3 06/28/2021     Lab Results   Component Value Date     07/26/2021     06/28/2021         Revised Cardiac Risk Index (RCRI):  The patient has the following serious cardiovascular risks for perioperative complications:   - No serious cardiac risks = 0 points     RCRI Interpretation: 0 points: Class I (very low risk - 0.4% complication rate)     Signed Electronically by: Carolynn Rodriguez NP  Copy of this evaluation report is provided to requesting physician.

## 2021-07-27 LAB — SARS-COV-2 RNA RESP QL NAA+PROBE: NEGATIVE

## 2021-07-28 ENCOUNTER — ANESTHESIA EVENT (OUTPATIENT)
Dept: SURGERY | Facility: CLINIC | Age: 61
DRG: 168 | End: 2021-07-28
Payer: COMMERCIAL

## 2021-07-28 ENCOUNTER — APPOINTMENT (OUTPATIENT)
Dept: GENERAL RADIOLOGY | Facility: CLINIC | Age: 61
DRG: 168 | End: 2021-07-28
Attending: PHYSICIAN ASSISTANT
Payer: COMMERCIAL

## 2021-07-28 ENCOUNTER — HOSPITAL ENCOUNTER (INPATIENT)
Facility: CLINIC | Age: 61
LOS: 1 days | Discharge: HOME | DRG: 168 | End: 2021-07-29
Attending: THORACIC SURGERY (CARDIOTHORACIC VASCULAR SURGERY) | Admitting: THORACIC SURGERY (CARDIOTHORACIC VASCULAR SURGERY)
Payer: COMMERCIAL

## 2021-07-28 ENCOUNTER — ANESTHESIA (OUTPATIENT)
Dept: SURGERY | Facility: CLINIC | Age: 61
DRG: 168 | End: 2021-07-28
Payer: COMMERCIAL

## 2021-07-28 LAB
ANION GAP SERPL CALCULATED.3IONS-SCNC: 3 MMOL/L (ref 3–14)
BUN SERPL-MCNC: 17 MG/DL (ref 7–30)
CALCIUM SERPL-MCNC: 8.8 MG/DL (ref 8.5–10.1)
CHLORIDE BLD-SCNC: 105 MMOL/L (ref 94–109)
CO2 SERPL-SCNC: 29 MMOL/L (ref 20–32)
CREAT SERPL-MCNC: 0.7 MG/DL (ref 0.52–1.04)
ERYTHROCYTE [DISTWIDTH] IN BLOOD BY AUTOMATED COUNT: 13.3 % (ref 10–15)
GFR SERPL CREATININE-BSD FRML MDRD: >90 ML/MIN/1.73M2
GLUCOSE BLD-MCNC: 164 MG/DL (ref 70–99)
GLUCOSE BLDC GLUCOMTR-MCNC: 109 MG/DL (ref 70–99)
GLUCOSE BLDC GLUCOMTR-MCNC: 122 MG/DL (ref 70–99)
GLUCOSE BLDC GLUCOMTR-MCNC: 162 MG/DL (ref 70–99)
HCT VFR BLD AUTO: 35.1 % (ref 35–47)
HGB BLD-MCNC: 11.4 G/DL (ref 11.7–15.7)
HOLD SPECIMEN: NORMAL
MAGNESIUM SERPL-MCNC: 2 MG/DL (ref 1.6–2.3)
MCH RBC QN AUTO: 30.7 PG (ref 26.5–33)
MCHC RBC AUTO-ENTMCNC: 32.5 G/DL (ref 31.5–36.5)
MCV RBC AUTO: 95 FL (ref 78–100)
PLATELET # BLD AUTO: 351 10E3/UL (ref 150–450)
POTASSIUM BLD-SCNC: 4.4 MMOL/L (ref 3.4–5.3)
RADIOLOGIST FLAGS: ABNORMAL
RBC # BLD AUTO: 3.71 10E6/UL (ref 3.8–5.2)
SODIUM SERPL-SCNC: 137 MMOL/L (ref 133–144)
WBC # BLD AUTO: 17.6 10E3/UL (ref 4–11)

## 2021-07-28 PROCEDURE — 999N000141 HC STATISTIC PRE-PROCEDURE NURSING ASSESSMENT: Performed by: THORACIC SURGERY (CARDIOTHORACIC VASCULAR SURGERY)

## 2021-07-28 PROCEDURE — 250N000013 HC RX MED GY IP 250 OP 250 PS 637: Performed by: PHYSICIAN ASSISTANT

## 2021-07-28 PROCEDURE — 258N000003 HC RX IP 258 OP 636: Performed by: ANESTHESIOLOGY

## 2021-07-28 PROCEDURE — 250N000011 HC RX IP 250 OP 636: Performed by: PHYSICIAN ASSISTANT

## 2021-07-28 PROCEDURE — 88331 PATH CONSLTJ SURG 1 BLK 1SPC: CPT | Mod: TC | Performed by: THORACIC SURGERY (CARDIOTHORACIC VASCULAR SURGERY)

## 2021-07-28 PROCEDURE — 80048 BASIC METABOLIC PNL TOTAL CA: CPT | Performed by: PHYSICIAN ASSISTANT

## 2021-07-28 PROCEDURE — 32666 THORACOSCOPY W/WEDGE RESECT: CPT | Mod: LT | Performed by: THORACIC SURGERY (CARDIOTHORACIC VASCULAR SURGERY)

## 2021-07-28 PROCEDURE — 250N000011 HC RX IP 250 OP 636: Performed by: THORACIC SURGERY (CARDIOTHORACIC VASCULAR SURGERY)

## 2021-07-28 PROCEDURE — 120N000002 HC R&B MED SURG/OB UMMC

## 2021-07-28 PROCEDURE — 88331 PATH CONSLTJ SURG 1 BLK 1SPC: CPT | Mod: 26 | Performed by: PATHOLOGY

## 2021-07-28 PROCEDURE — 250N000024 HC ISOFLURANE, PER MIN: Performed by: THORACIC SURGERY (CARDIOTHORACIC VASCULAR SURGERY)

## 2021-07-28 PROCEDURE — 272N000001 HC OR GENERAL SUPPLY STERILE: Performed by: THORACIC SURGERY (CARDIOTHORACIC VASCULAR SURGERY)

## 2021-07-28 PROCEDURE — 250N000011 HC RX IP 250 OP 636: Performed by: NURSE ANESTHETIST, CERTIFIED REGISTERED

## 2021-07-28 PROCEDURE — 250N000009 HC RX 250: Performed by: NURSE ANESTHETIST, CERTIFIED REGISTERED

## 2021-07-28 PROCEDURE — 250N000011 HC RX IP 250 OP 636: Performed by: ANESTHESIOLOGY

## 2021-07-28 PROCEDURE — 83735 ASSAY OF MAGNESIUM: CPT | Performed by: PHYSICIAN ASSISTANT

## 2021-07-28 PROCEDURE — 88312 SPECIAL STAINS GROUP 1: CPT | Mod: 26 | Performed by: PATHOLOGY

## 2021-07-28 PROCEDURE — 0BBC4ZX EXCISION OF RIGHT UPPER LUNG LOBE, PERCUTANEOUS ENDOSCOPIC APPROACH, DIAGNOSTIC: ICD-10-PCS | Performed by: THORACIC SURGERY (CARDIOTHORACIC VASCULAR SURGERY)

## 2021-07-28 PROCEDURE — 71045 X-RAY EXAM CHEST 1 VIEW: CPT | Mod: 26 | Performed by: RADIOLOGY

## 2021-07-28 PROCEDURE — 710N000010 HC RECOVERY PHASE 1, LEVEL 2, PER MIN: Performed by: THORACIC SURGERY (CARDIOTHORACIC VASCULAR SURGERY)

## 2021-07-28 PROCEDURE — 370N000017 HC ANESTHESIA TECHNICAL FEE, PER MIN: Performed by: THORACIC SURGERY (CARDIOTHORACIC VASCULAR SURGERY)

## 2021-07-28 PROCEDURE — 85027 COMPLETE CBC AUTOMATED: CPT | Performed by: PHYSICIAN ASSISTANT

## 2021-07-28 PROCEDURE — 71045 X-RAY EXAM CHEST 1 VIEW: CPT

## 2021-07-28 PROCEDURE — 8E0W4CZ ROBOTIC ASSISTED PROCEDURE OF TRUNK REGION, PERCUTANEOUS ENDOSCOPIC APPROACH: ICD-10-PCS | Performed by: THORACIC SURGERY (CARDIOTHORACIC VASCULAR SURGERY)

## 2021-07-28 PROCEDURE — 360N000080 HC SURGERY LEVEL 7, PER MIN: Performed by: THORACIC SURGERY (CARDIOTHORACIC VASCULAR SURGERY)

## 2021-07-28 PROCEDURE — 258N000003 HC RX IP 258 OP 636: Performed by: NURSE ANESTHETIST, CERTIFIED REGISTERED

## 2021-07-28 PROCEDURE — 36415 COLL VENOUS BLD VENIPUNCTURE: CPT | Performed by: THORACIC SURGERY (CARDIOTHORACIC VASCULAR SURGERY)

## 2021-07-28 PROCEDURE — 88307 TISSUE EXAM BY PATHOLOGIST: CPT | Mod: 26 | Performed by: PATHOLOGY

## 2021-07-28 RX ORDER — OXYCODONE HYDROCHLORIDE 5 MG/1
5 TABLET ORAL EVERY 4 HOURS PRN
Status: DISCONTINUED | OUTPATIENT
Start: 2021-07-28 | End: 2021-07-29 | Stop reason: HOSPADM

## 2021-07-28 RX ORDER — GABAPENTIN 100 MG/1
100 CAPSULE ORAL 3 TIMES DAILY
Status: DISCONTINUED | OUTPATIENT
Start: 2021-07-28 | End: 2021-07-29 | Stop reason: HOSPADM

## 2021-07-28 RX ORDER — HYDROMORPHONE HCL IN WATER/PF 6 MG/30 ML
0.2 PATIENT CONTROLLED ANALGESIA SYRINGE INTRAVENOUS
Status: DISCONTINUED | OUTPATIENT
Start: 2021-07-28 | End: 2021-07-29 | Stop reason: HOSPADM

## 2021-07-28 RX ORDER — PANTOPRAZOLE SODIUM 40 MG/1
40 TABLET, DELAYED RELEASE ORAL DAILY
Status: DISCONTINUED | OUTPATIENT
Start: 2021-07-28 | End: 2021-07-29 | Stop reason: HOSPADM

## 2021-07-28 RX ORDER — FENTANYL CITRATE 50 UG/ML
INJECTION, SOLUTION INTRAMUSCULAR; INTRAVENOUS PRN
Status: DISCONTINUED | OUTPATIENT
Start: 2021-07-28 | End: 2021-07-28

## 2021-07-28 RX ORDER — ONDANSETRON 2 MG/ML
INJECTION INTRAMUSCULAR; INTRAVENOUS PRN
Status: DISCONTINUED | OUTPATIENT
Start: 2021-07-28 | End: 2021-07-28

## 2021-07-28 RX ORDER — LIDOCAINE 40 MG/G
CREAM TOPICAL
Status: DISCONTINUED | OUTPATIENT
Start: 2021-07-28 | End: 2021-07-28 | Stop reason: HOSPADM

## 2021-07-28 RX ORDER — HYDROMORPHONE HCL IN WATER/PF 6 MG/30 ML
0.4 PATIENT CONTROLLED ANALGESIA SYRINGE INTRAVENOUS EVERY 5 MIN PRN
Status: ACTIVE | OUTPATIENT
Start: 2021-07-28 | End: 2021-07-28

## 2021-07-28 RX ORDER — CEFAZOLIN SODIUM 2 G/100ML
2 INJECTION, SOLUTION INTRAVENOUS
Status: COMPLETED | OUTPATIENT
Start: 2021-07-28 | End: 2021-07-28

## 2021-07-28 RX ORDER — ACETAMINOPHEN 500 MG
1000 TABLET ORAL EVERY 6 HOURS
Status: DISCONTINUED | OUTPATIENT
Start: 2021-07-28 | End: 2021-07-29 | Stop reason: HOSPADM

## 2021-07-28 RX ORDER — LIDOCAINE 4 G/G
1 PATCH TOPICAL
Status: DISCONTINUED | OUTPATIENT
Start: 2021-07-28 | End: 2021-07-29 | Stop reason: HOSPADM

## 2021-07-28 RX ORDER — EPHEDRINE SULFATE 50 MG/ML
INJECTION, SOLUTION INTRAMUSCULAR; INTRAVENOUS; SUBCUTANEOUS PRN
Status: DISCONTINUED | OUTPATIENT
Start: 2021-07-28 | End: 2021-07-28

## 2021-07-28 RX ORDER — PROPOFOL 10 MG/ML
INJECTION, EMULSION INTRAVENOUS PRN
Status: DISCONTINUED | OUTPATIENT
Start: 2021-07-28 | End: 2021-07-28

## 2021-07-28 RX ORDER — ONDANSETRON 4 MG/1
4 TABLET, ORALLY DISINTEGRATING ORAL EVERY 6 HOURS PRN
Status: DISCONTINUED | OUTPATIENT
Start: 2021-07-28 | End: 2021-07-29 | Stop reason: HOSPADM

## 2021-07-28 RX ORDER — NALOXONE HYDROCHLORIDE 0.4 MG/ML
0.2 INJECTION, SOLUTION INTRAMUSCULAR; INTRAVENOUS; SUBCUTANEOUS
Status: DISCONTINUED | OUTPATIENT
Start: 2021-07-28 | End: 2021-07-29 | Stop reason: HOSPADM

## 2021-07-28 RX ORDER — NALOXONE HYDROCHLORIDE 0.4 MG/ML
0.4 INJECTION, SOLUTION INTRAMUSCULAR; INTRAVENOUS; SUBCUTANEOUS
Status: DISCONTINUED | OUTPATIENT
Start: 2021-07-28 | End: 2021-07-29 | Stop reason: HOSPADM

## 2021-07-28 RX ORDER — ONDANSETRON 2 MG/ML
4 INJECTION INTRAMUSCULAR; INTRAVENOUS EVERY 6 HOURS PRN
Status: DISCONTINUED | OUTPATIENT
Start: 2021-07-28 | End: 2021-07-29 | Stop reason: HOSPADM

## 2021-07-28 RX ORDER — TRAZODONE HYDROCHLORIDE 50 MG/1
50-100 TABLET, FILM COATED ORAL
Status: DISCONTINUED | OUTPATIENT
Start: 2021-07-28 | End: 2021-07-29 | Stop reason: HOSPADM

## 2021-07-28 RX ORDER — CEFAZOLIN SODIUM 2 G/100ML
2 INJECTION, SOLUTION INTRAVENOUS SEE ADMIN INSTRUCTIONS
Status: DISCONTINUED | OUTPATIENT
Start: 2021-07-28 | End: 2021-07-28 | Stop reason: HOSPADM

## 2021-07-28 RX ORDER — IBUPROFEN 600 MG/1
600 TABLET, FILM COATED ORAL EVERY 6 HOURS PRN
Status: DISCONTINUED | OUTPATIENT
Start: 2021-07-28 | End: 2021-07-29 | Stop reason: HOSPADM

## 2021-07-28 RX ORDER — AMOXICILLIN 250 MG
1 CAPSULE ORAL 2 TIMES DAILY
Status: DISCONTINUED | OUTPATIENT
Start: 2021-07-28 | End: 2021-07-29 | Stop reason: HOSPADM

## 2021-07-28 RX ORDER — ONDANSETRON 2 MG/ML
4 INJECTION INTRAMUSCULAR; INTRAVENOUS EVERY 30 MIN PRN
Status: DISCONTINUED | OUTPATIENT
Start: 2021-07-28 | End: 2021-07-28 | Stop reason: HOSPADM

## 2021-07-28 RX ORDER — BUPIVACAINE HYDROCHLORIDE 2.5 MG/ML
INJECTION, SOLUTION INFILTRATION; PERINEURAL PRN
Status: DISCONTINUED | OUTPATIENT
Start: 2021-07-28 | End: 2021-07-28 | Stop reason: HOSPADM

## 2021-07-28 RX ORDER — PROCHLORPERAZINE MALEATE 5 MG
10 TABLET ORAL EVERY 6 HOURS PRN
Status: DISCONTINUED | OUTPATIENT
Start: 2021-07-28 | End: 2021-07-29 | Stop reason: HOSPADM

## 2021-07-28 RX ORDER — OXYCODONE HYDROCHLORIDE 10 MG/1
10 TABLET ORAL EVERY 4 HOURS PRN
Status: DISCONTINUED | OUTPATIENT
Start: 2021-07-28 | End: 2021-07-29 | Stop reason: HOSPADM

## 2021-07-28 RX ORDER — SODIUM CHLORIDE, SODIUM LACTATE, POTASSIUM CHLORIDE, CALCIUM CHLORIDE 600; 310; 30; 20 MG/100ML; MG/100ML; MG/100ML; MG/100ML
INJECTION, SOLUTION INTRAVENOUS CONTINUOUS
Status: DISCONTINUED | OUTPATIENT
Start: 2021-07-28 | End: 2021-07-28 | Stop reason: HOSPADM

## 2021-07-28 RX ORDER — SCOLOPAMINE TRANSDERMAL SYSTEM 1 MG/1
1 PATCH, EXTENDED RELEASE TRANSDERMAL
Status: DISCONTINUED | OUTPATIENT
Start: 2021-07-28 | End: 2021-07-28 | Stop reason: HOSPADM

## 2021-07-28 RX ORDER — METHOCARBAMOL 750 MG/1
750 TABLET, FILM COATED ORAL EVERY 6 HOURS PRN
Status: DISCONTINUED | OUTPATIENT
Start: 2021-07-28 | End: 2021-07-29 | Stop reason: HOSPADM

## 2021-07-28 RX ORDER — SODIUM CHLORIDE, SODIUM LACTATE, POTASSIUM CHLORIDE, CALCIUM CHLORIDE 600; 310; 30; 20 MG/100ML; MG/100ML; MG/100ML; MG/100ML
INJECTION, SOLUTION INTRAVENOUS CONTINUOUS PRN
Status: DISCONTINUED | OUTPATIENT
Start: 2021-07-28 | End: 2021-07-28

## 2021-07-28 RX ORDER — ONDANSETRON 4 MG/1
4 TABLET, ORALLY DISINTEGRATING ORAL EVERY 30 MIN PRN
Status: DISCONTINUED | OUTPATIENT
Start: 2021-07-28 | End: 2021-07-28 | Stop reason: HOSPADM

## 2021-07-28 RX ORDER — FENTANYL CITRATE 50 UG/ML
50 INJECTION, SOLUTION INTRAMUSCULAR; INTRAVENOUS EVERY 5 MIN PRN
Status: ACTIVE | OUTPATIENT
Start: 2021-07-28 | End: 2021-07-28

## 2021-07-28 RX ORDER — LIDOCAINE HYDROCHLORIDE 20 MG/ML
INJECTION, SOLUTION INFILTRATION; PERINEURAL PRN
Status: DISCONTINUED | OUTPATIENT
Start: 2021-07-28 | End: 2021-07-28

## 2021-07-28 RX ORDER — POLYETHYLENE GLYCOL 3350 17 G/17G
17 POWDER, FOR SOLUTION ORAL DAILY
Status: DISCONTINUED | OUTPATIENT
Start: 2021-07-29 | End: 2021-07-29 | Stop reason: HOSPADM

## 2021-07-28 RX ADMIN — FENTANYL CITRATE 50 MCG: 50 INJECTION, SOLUTION INTRAMUSCULAR; INTRAVENOUS at 11:11

## 2021-07-28 RX ADMIN — HYDROMORPHONE HYDROCHLORIDE 0.4 MG: 0.2 INJECTION, SOLUTION INTRAMUSCULAR; INTRAVENOUS; SUBCUTANEOUS at 14:28

## 2021-07-28 RX ADMIN — ROCURONIUM BROMIDE 10 MG: 10 INJECTION INTRAVENOUS at 09:38

## 2021-07-28 RX ADMIN — METHOCARBAMOL 750 MG: 750 TABLET ORAL at 17:35

## 2021-07-28 RX ADMIN — PROPOFOL 20 MG: 10 INJECTION, EMULSION INTRAVENOUS at 08:35

## 2021-07-28 RX ADMIN — GABAPENTIN 100 MG: 100 CAPSULE ORAL at 16:30

## 2021-07-28 RX ADMIN — HYDROMORPHONE HYDROCHLORIDE 0.4 MG: 0.2 INJECTION, SOLUTION INTRAMUSCULAR; INTRAVENOUS; SUBCUTANEOUS at 11:41

## 2021-07-28 RX ADMIN — Medication 2 G: at 08:00

## 2021-07-28 RX ADMIN — GABAPENTIN 100 MG: 100 CAPSULE ORAL at 19:26

## 2021-07-28 RX ADMIN — MIDAZOLAM 2 MG: 1 INJECTION INTRAMUSCULAR; INTRAVENOUS at 07:33

## 2021-07-28 RX ADMIN — ACETAMINOPHEN 1000 MG: 500 TABLET, FILM COATED ORAL at 17:35

## 2021-07-28 RX ADMIN — FENTANYL CITRATE 50 MCG: 50 INJECTION, SOLUTION INTRAMUSCULAR; INTRAVENOUS at 14:15

## 2021-07-28 RX ADMIN — OXYCODONE HYDROCHLORIDE 5 MG: 5 TABLET ORAL at 17:36

## 2021-07-28 RX ADMIN — SODIUM CHLORIDE, POTASSIUM CHLORIDE, SODIUM LACTATE AND CALCIUM CHLORIDE: 600; 310; 30; 20 INJECTION, SOLUTION INTRAVENOUS at 07:20

## 2021-07-28 RX ADMIN — HYDROMORPHONE HYDROCHLORIDE 0.2 MG: 0.2 INJECTION, SOLUTION INTRAMUSCULAR; INTRAVENOUS; SUBCUTANEOUS at 16:30

## 2021-07-28 RX ADMIN — PROPOFOL 40 MG: 10 INJECTION, EMULSION INTRAVENOUS at 10:33

## 2021-07-28 RX ADMIN — ROCURONIUM BROMIDE 50 MG: 10 INJECTION INTRAVENOUS at 07:47

## 2021-07-28 RX ADMIN — HYDROMORPHONE HYDROCHLORIDE 0.2 MG: 0.2 INJECTION, SOLUTION INTRAMUSCULAR; INTRAVENOUS; SUBCUTANEOUS at 22:37

## 2021-07-28 RX ADMIN — FENTANYL CITRATE 50 MCG: 50 INJECTION, SOLUTION INTRAMUSCULAR; INTRAVENOUS at 08:35

## 2021-07-28 RX ADMIN — ACETAMINOPHEN 1000 MG: 500 TABLET, FILM COATED ORAL at 21:27

## 2021-07-28 RX ADMIN — Medication 10 MG: at 10:42

## 2021-07-28 RX ADMIN — LIDOCAINE HYDROCHLORIDE 100 MG: 20 INJECTION, SOLUTION INFILTRATION; PERINEURAL at 07:47

## 2021-07-28 RX ADMIN — HYDROMORPHONE HYDROCHLORIDE 0.4 MG: 0.2 INJECTION, SOLUTION INTRAMUSCULAR; INTRAVENOUS; SUBCUTANEOUS at 12:18

## 2021-07-28 RX ADMIN — SODIUM CHLORIDE, POTASSIUM CHLORIDE, SODIUM LACTATE AND CALCIUM CHLORIDE: 600; 310; 30; 20 INJECTION, SOLUTION INTRAVENOUS at 12:03

## 2021-07-28 RX ADMIN — LIDOCAINE 1 PATCH: 246 PATCH TOPICAL at 19:26

## 2021-07-28 RX ADMIN — FENTANYL CITRATE 50 MCG: 50 INJECTION, SOLUTION INTRAMUSCULAR; INTRAVENOUS at 14:04

## 2021-07-28 RX ADMIN — HYDROMORPHONE HYDROCHLORIDE 0.4 MG: 0.2 INJECTION, SOLUTION INTRAMUSCULAR; INTRAVENOUS; SUBCUTANEOUS at 12:59

## 2021-07-28 RX ADMIN — ENOXAPARIN SODIUM 40 MG: 40 INJECTION, SOLUTION INTRAVENOUS; SUBCUTANEOUS at 07:03

## 2021-07-28 RX ADMIN — PANTOPRAZOLE SODIUM 40 MG: 40 TABLET, DELAYED RELEASE ORAL at 17:36

## 2021-07-28 RX ADMIN — FENTANYL CITRATE 50 MCG: 50 INJECTION, SOLUTION INTRAMUSCULAR; INTRAVENOUS at 11:08

## 2021-07-28 RX ADMIN — SCOPALAMINE 1 PATCH: 1 PATCH, EXTENDED RELEASE TRANSDERMAL at 08:00

## 2021-07-28 RX ADMIN — SODIUM CHLORIDE, POTASSIUM CHLORIDE, SODIUM LACTATE AND CALCIUM CHLORIDE 250 ML: 600; 310; 30; 20 INJECTION, SOLUTION INTRAVENOUS at 11:30

## 2021-07-28 RX ADMIN — ONDANSETRON 4 MG: 2 INJECTION INTRAMUSCULAR; INTRAVENOUS at 07:33

## 2021-07-28 RX ADMIN — OXYCODONE HYDROCHLORIDE 5 MG: 5 TABLET ORAL at 21:27

## 2021-07-28 RX ADMIN — PHENYLEPHRINE HYDROCHLORIDE 100 MCG: 10 INJECTION INTRAVENOUS at 09:41

## 2021-07-28 RX ADMIN — FENTANYL CITRATE 50 MCG: 50 INJECTION, SOLUTION INTRAMUSCULAR; INTRAVENOUS at 11:48

## 2021-07-28 RX ADMIN — DOCUSATE SODIUM 50 MG AND SENNOSIDES 8.6 MG 1 TABLET: 8.6; 5 TABLET, FILM COATED ORAL at 19:26

## 2021-07-28 RX ADMIN — ROCURONIUM BROMIDE 20 MG: 10 INJECTION INTRAVENOUS at 08:50

## 2021-07-28 RX ADMIN — PANCRELIPASE 2 CAPSULE: 24000; 76000; 120000 CAPSULE, DELAYED RELEASE PELLETS ORAL at 22:47

## 2021-07-28 RX ADMIN — Medication 10 MG: at 08:49

## 2021-07-28 RX ADMIN — SUGAMMADEX 200 MG: 100 INJECTION, SOLUTION INTRAVENOUS at 11:00

## 2021-07-28 RX ADMIN — FENTANYL CITRATE 100 MCG: 50 INJECTION, SOLUTION INTRAMUSCULAR; INTRAVENOUS at 07:47

## 2021-07-28 RX ADMIN — FENTANYL CITRATE 50 MCG: 50 INJECTION, SOLUTION INTRAMUSCULAR; INTRAVENOUS at 11:37

## 2021-07-28 RX ADMIN — HYDROMORPHONE HYDROCHLORIDE 0.2 MG: 0.2 INJECTION, SOLUTION INTRAMUSCULAR; INTRAVENOUS; SUBCUTANEOUS at 19:26

## 2021-07-28 RX ADMIN — PANCRELIPASE 4 CAPSULE: 24000; 76000; 120000 CAPSULE, DELAYED RELEASE PELLETS ORAL at 18:28

## 2021-07-28 RX ADMIN — PROPOFOL 100 MG: 10 INJECTION, EMULSION INTRAVENOUS at 07:47

## 2021-07-28 RX ADMIN — PHENYLEPHRINE HYDROCHLORIDE 100 MCG: 10 INJECTION INTRAVENOUS at 10:21

## 2021-07-28 ASSESSMENT — ACTIVITIES OF DAILY LIVING (ADL)
ADLS_ACUITY_SCORE: 13
ADLS_ACUITY_SCORE: 13

## 2021-07-28 ASSESSMENT — MIFFLIN-ST. JEOR: SCORE: 1174.88

## 2021-07-28 NOTE — ANESTHESIA PROCEDURE NOTES
Airway       Patient location during procedure: OR       Procedure Start/Stop Times: 7/28/2021 7:50 AM  Staff -        CRNA: Stanley Gould APRN CRNA       Performed By: CRNA  Consent for Airway        Urgency: elective  Indications and Patient Condition       Indications for airway management: magdaleno-procedural       Induction type:intravenous       Mask difficulty assessment: 2 - vent by mask + OA or adjuvant +/- NMBA    Final Airway Details       Final airway type: endotracheal airway       Successful airway: ETT - double lumen left  Endotracheal Airway Details        Cuffed: yes       Successful intubation technique: direct laryngoscopy and flexible bronchoscopy       DL Blade Type: Conroy 2       Grade View of Cords: 1       Adjucts: stylet       Position: Right       Measured from: gums/teeth       Secured at (cm): 27       ETT Double lumen (fr): 37    Post intubation assessment        Placement verified by: capnometry, equal breath sounds and chest rise        Number of attempts at approach: 1       Secured with: cloth tape       Ease of procedure: easy       Dentition: Intact

## 2021-07-28 NOTE — OR NURSING
Patient's BP soft on arrival, MAP 60.  Dr. Escobar (anesthesia) notified.   VORB given for fluid bolus.

## 2021-07-28 NOTE — PLAN OF CARE
"Neuro: A/Ox4 calls appropriately  Respiratory: on 2L NC  Cardiac: WDL denies chest pain  Diet: clear liquids at this time to advance as tolerated.  GI/: +BS no BM this shift reports BM pre surgery. Void with AUOP.  Incision/Drains: Lap sites to back x3 and underneath left beast x1 with liquid bandage open to air no drainage noted. Right chest to water seal. Dressing clean dry and intact.   IV Access: PIV to left and right SL  Lab: reviewed  Pain: reports pain to incision site managed with PRN oxycodone 5mg, dilaudid, robaxin and scheduled tylenol.  VS:BP 98/57 (BP Location: Right arm)   Pulse 52   Temp 98.1  F (36.7  C) (Oral)   Resp 16   Ht 1.651 m (5' 5\")   Wt 61.4 kg (135 lb 5.8 oz)   SpO2 98%   BMI 22.53 kg/m    Activity: up with assist of 2. Ambulated to bathroom x1.  New Changes this shift:  Plan: continue to monitor chest tube site and output, manage pain. Continue POC       "

## 2021-07-28 NOTE — OR NURSING
"AT 1418 Paged Dr. Mayfield \"PACU 14 MARGUERITE Bangura develop some crepitus around CT site. Repeat XRay? plz advise. thank you, Alyce ALATORRE, [phone]\"      At 1456 paged Dr. Mayfield \"PACU14 MARGUERITE Bangura is ready to be transfer up to 7B. plz advise regarding crepitus around Left chest tube site from last message. Would you like any intervention or is it okay to transfer to 7B? thanks, BRENDAN Mead [phone]\"   " No

## 2021-07-28 NOTE — ANESTHESIA PREPROCEDURE EVALUATION
Anesthesia Pre-Procedure Evaluation    Patient: Kitty Bangura   MRN: [id number] : 1960        Preoperative Diagnosis: Lung nodule < 6cm on CT [R91.1]   Procedure : Procedure(s):  Robot-assisted left lower lobe wedge, possible lobectomy, mediastinal lymph node dissection     Past Medical History:   Diagnosis Date     Arthritis Post chemo    Suspected     Diabetes (H)      History of blood transfusion      History of total splenectomy 2018     Necrotizing pancreatitis      Necrotizing pancreatitis 2017     Pancreatic cancer (H)      Pancreatic mass 2017    On CT abd/pelvis: 3 cm heterogeneous mass within the tail of the pancreas. A few calcifications are noted along the lateral margin of the mass.     Pneumonia     2016     PONV (postoperative nausea and vomiting)       Past Surgical History:   Procedure Laterality Date     ABDOMEN SURGERY      Ruptured tubal pregnancies, additional surgeries followed     BACK SURGERY      L5, S1 discectomy     BREAST SURGERY      Breast reduction     C  DELIVERY ONLY      Description:  Section;  Recorded: 12/10/2009;     COLONOSCOPY       COLONOSCOPY N/A 2018    Procedure: colonoscopy;  Surgeon: Lobito Marte MD;  Location: [facility]     ENDOSCOPIC RETROGRADE CHOLANGIOPANCREATOGRAM N/A 2018    Procedure: COMBINED ENDOSCOPIC RETROGRADE CHOLANGIOPANCREATOGRAPHY, PLACE TUBE/STENT;  Endoscopic retrograde cholangiopancreatogram with stent placement and cyst drainage bile ;  Surgeon: Vito Sanches MD;  Location: [facility]     ENDOSCOPIC ULTRASOUND LOWER GASTROINTESTIONAL TRACT (GI) N/A 2018    Procedure: ENDOSCOPIC ULTRASOUND LOWER GASTROINTESTIONAL TRACT (GI);  Endoscopic Ultrasound with guided Cyst-Gastrostomy;  Surgeon: González Metz MD;  Location: [facility]     ENDOSCOPIC ULTRASOUND, ESOPHAGOSCOPY, GASTROSCOPY, DUODENOSCOPY (EGD), NECROSECTOMY N/A 2017    Procedure: ENDOSCOPIC ULTRASOUND,  ESOPHAGOSCOPY, GASTROSCOPY, DUODENOSCOPY (EGD), NECROSECTOMY;  Esophagogastroduodenoscopy, with  Necrosectomy and stents replacement  ;  Surgeon: González Metz MD;  Location: [facility]     ENDOSCOPIC ULTRASOUND, ESOPHAGOSCOPY, GASTROSCOPY, DUODENOSCOPY (EGD), NECROSECTOMY N/A 2017    Procedure: ENDOSCOPIC ULTRASOUND, ESOPHAGOSCOPY, GASTROSCOPY, DUODENOSCOPY (EGD), NECROSECTOMY;  Esophagogastroduodenoscopy with Necrosectomy, Balloon Dilation, stent removal X3 and Cystgastrostomy stent Placement X2;  Surgeon: Guru Cecilia Staples MD;  Location: [facility]     ESOPHAGOSCOPY, GASTROSCOPY, DUODENOSCOPY (EGD), COMBINED N/A 2017    Procedure: COMBINED ENDOSCOPIC ULTRASOUND, ESOPHAGOSCOPY, GASTROSCOPY, DUODENOSCOPY (EGD), FINE NEEDLE ASPIRATE/BIOPSY;  Endoscopic Ultrasound with cystgastrostomy and fine needle aspiration ;  Surgeon: González Metz MD;  Location: [facility]     ESOPHAGOSCOPY, GASTROSCOPY, DUODENOSCOPY (EGD), COMBINED N/A 2018    Procedure: COMBINED ESOPHAGOSCOPY, GASTROSCOPY, DUODENOSCOPY (EGD);  EGD;  Surgeon: González Metz MD;  Location:  GI     ESOPHAGOSCOPY, GASTROSCOPY, DUODENOSCOPY (EGD), COMBINED N/A 2018    Procedure: COMBINED ESOPHAGOSCOPY, GASTROSCOPY, DUODENOSCOPY (EGD), REMOVE FOREIGN BODY;;  Surgeon: González Metz MD;  Location:  GI     GYN SURGERY  1983    Multiple ectopic pregnancies, reconnect,  1988     INSERT PORT VASCULAR ACCESS Right 2018    Procedure: INSERT PORT VASCULAR ACCESS;  Chest Port Placement - right;  Surgeon: Chanda Lawson PA-C;  [facility]: [facility]     IR PORT REMOVAL RIGHT  2019     LAPAROSCOPY DIAGNOSTIC (GENERAL) N/A 2018    Procedure: LAPAROSCOPY DIAGNOSTIC (GENERAL);  Diagnostic Laparoscopy; Open Distal Pancreatectomy And Splenectomy, splenic flexure mobilization, cholecystectomy, intraoperative ultrasound;  Surgeon: Ja Byrd MD;  Location:  OR     MAMMOPLASTY REDUCTION Bilateral 2006      PANCREATECTOMY, SPLENECTOMY N/A 2018    Procedure: PANCREATECTOMY, SPLENECTOMY;;  Surgeon: Ja Byrd MD;  Location: [facility]     HI LAMNOTMY INCL W/DCMPRSN NRV ROOT 1 INTRSPC LUMBR      Description: Hemilaminectomy With Disc Removal One Lumbar Interspace;  Recorded: 2008;  Comments: Right L5-S1 with resultant loss of right patellar and achilles reflex     REMOVE PORT VASCULAR ACCESS Right 2019    Procedure: Right Port Removal;  Surgeon: Juan J Donaldson PA-C;  Location: [facility]      No Known Allergies   Social History     Tobacco Use     Smoking status: Former Smoker     Packs/day: 0.50     Years: 5.00     Pack years: 2.50     Types: Cigarettes     Start date: 1974     Quit date:      Years since quittin.5     Smokeless tobacco: Never Used   Substance Use Topics     Alcohol use: No     Alcohol/week: 21.0 standard drinks     Comment: Now quit since Oct 31.  Moderate drinker in past      Wt Readings from Last 1 Encounters:   21 61.4 kg (135 lb 5.8 oz)        Anesthesia Evaluation            ROS/MED HX  ENT/Pulmonary: Comment: Lung nodule      Neurologic:  - neg neurologic ROS     Cardiovascular:  - neg cardiovascular ROS     METS/Exercise Tolerance:     Hematologic:  - neg hematologic  ROS     Musculoskeletal:  - neg musculoskeletal ROS     GI/Hepatic: Comment: - Pancreatic adenocarcinoma s/p pancreatectomy, splenectomy and chemo (FOLFORINOX)  - Liver nodules       Renal/Genitourinary:       Endo:     (+) type II DM,     Psychiatric/Substance Use:  - neg psychiatric ROS     Infectious Disease:       Malignancy:       Other:            Physical Exam    Airway        Mallampati: II   TM distance: > 3 FB   Neck ROM: full   Mouth opening: > 3 cm    Respiratory Devices and Support         Dental  no notable dental history         Cardiovascular          Rhythm and rate: regular and normal     Pulmonary           breath sounds clear to auscultation           OUTSIDE LABS:  CBC:    Lab Results   Component Value Date    WBC 10.0 07/26/2021    WBC 8.1 06/28/2021    HGB 12.8 07/26/2021    HGB 13.3 06/28/2021    HCT 37.9 07/26/2021    HCT 41.0 06/28/2021     07/26/2021     06/28/2021     BMP:   Lab Results   Component Value Date     06/28/2021     03/29/2021    POTASSIUM 4.4 06/28/2021    POTASSIUM 4.3 03/29/2021    CHLORIDE 101 06/28/2021    CHLORIDE 102 03/29/2021    CO2 30 06/28/2021    CO2 30 03/29/2021    BUN 15 06/28/2021    BUN 12 03/29/2021    CR 0.60 06/28/2021    CR 0.60 03/29/2021     (H) 07/28/2021     (H) 06/28/2021     COAGS:   Lab Results   Component Value Date    INR 1.01 07/08/2019     POC:   Lab Results   Component Value Date    BGM 91 07/08/2019     HEPATIC:   Lab Results   Component Value Date    ALBUMIN 4.1 06/28/2021    PROTTOTAL 7.9 06/28/2021    ALT 21 06/28/2021    AST 20 06/28/2021    ALKPHOS 79 06/28/2021    BILITOTAL 0.8 06/28/2021     OTHER:   Lab Results   Component Value Date    PH 7.32 (L) 04/17/2018    LACT 1.0 01/20/2018    A1C 6.4 (H) 07/26/2021    ILIANA 9.4 06/28/2021    PHOS 3.7 07/16/2018    MAG 2.3 07/16/2018    LIPASE 71 (L) 02/11/2018    AMYLASE 21 (L) 04/20/2018    TSH 1.48 04/30/2019       Anesthesia Plan    ASA Status:  3   NPO Status:  NPO Appropriate    Anesthesia Type: General.     - Airway: ETT   Induction: Intravenous.   Maintenance: Inhalation.   Techniques and Equipment:     - AVOID: Avoid NG/OG     - Airway: Fiberoptic Bronchoscope, Double lumen ETT     - Lines/Monitors: 2nd IV     Consents    Anesthesia Plan(s) and associated risks, benefits, and realistic alternatives discussed. Questions answered and patient/representative(s) expressed understanding.     - Discussed with:  Patient      - Extended Intubation/Ventilatory Support Discussed: No.      - Patient is DNR/DNI Status: No    Use of blood products discussed: Yes.     - Discussed with: Patient.     - Consented: consented to blood products             Reason for refusal: other.     Postoperative Care    Pain management: IV analgesics.   PONV prophylaxis: Scopolamine patch, Ondansetron (or other 5HT-3), Dexamethasone or Solumedrol     Comments:                Rafi Escobar MD

## 2021-07-28 NOTE — PROVIDER NOTIFICATION
Patient arrived in PACU accompanied by CRNA and OR RN.  Patient's identity verified.  Bedside report received.

## 2021-07-28 NOTE — BRIEF OP NOTE
[facility]    Brief Operative Note    Pre-operative diagnosis: Lung nodule < 6cm on CT [R91.1]  Post-operative diagnosis Granuloma     Procedure: Procedure(s):  Robot-assisted thoracoscopic left lower lobe wedge resection  Surgeon: Surgeon(s) and Role:     * René Phillips MD - Primary     * Madhu Mayfield PA-C - Assisting  Anesthesia: General   Estimated blood loss: Minimal  Drains: L 28F pleural chest tube  Specimens:   ID Type Source Tests Collected by Time Destination   1 : Left Lower Lobe Wedge Tissue Lung, Lower Lobe, Left SURGICAL PATHOLOGY EXAM René Phillips MD 7/28/2021  9:43 AM      Findings:   None.  Complications: None.  Implants: * No implants in log *

## 2021-07-28 NOTE — OP NOTE
Preoperative diagnosis:                        Lung nodule  Postoperative diagnosis:                      Lung nodule     Procedure:   1. Left robot-assisted wedge resection     Anesthesia: General   Surgeon: René Phillips   Assistants:  Narayan Heart, Lobito Balbuena, Madhu Mayfield PA-C  EBL: Less than 5 mL     Complications: none immediate     Description of procedure  The patient was brought to the room and placed supine upon the table.  After confirming the patient's identity and verifying the consent, appropriate monitoring devices were placed, as well as SCD boots. General anesthesia was administered.  The patient was intubated with a double-lumen endotracheal tube.  Proper position was confirmed by fiberoptic bronchoscopy.  Intravenous antibiotic was administered within 1 hour prior to the incision. The patient was turned to the right lateral decubitus position and all pressure points were padded. The bed was flexed, and the patient was secured to the table and the left arm was placed on an airplane board.  The left chest was prepped with chlorhexidine and  draped in the standard surgical fashion.       An 8 mm incision was made in the eighth intercostal space in line with the anterior superior iliac spine.  This was carried down to the pleural cavity.  An 8 mm port was placed and an 8 mm, 30-degree thoracoscope was inserted into the pleural cavity.  There were no adhesions.  A 12 mm port was placed anteriorly and another 12 mm port placed posteriorly, approximately 6 cm apart.  A final 8 mm port was placed 6 cm more posterior from the posterior 12 mm port.  Finally, a 15 mm assistant port was made in the low anterior chest.  The robot was docked without difficulty. The nodule was located in the right upper lobe and a wedge resection was performed with blue loads of the stapler. The frozen section was benign. A multiple level intercostal nerve block was performed and Progel was applied to the raw surfaces  of the lung. A 28 Khmer chest tube was then placed going posteriorly to the apex  through the initial camera port site.  This was secured to the skin using 0 silk suture.  The lung was observed to inflate appropriately.  The utility incision was closed in layers, irrigating each layer prior to closure.  The smaller incisions were closed in layers. The areas were cleaned and dried and Exofin was applied.      At  the end of the case, sponge, needle, and instrument counts were correct.

## 2021-07-28 NOTE — ANESTHESIA POSTPROCEDURE EVALUATION
Patient: Kitty Bangura    Procedure(s):  Robot-assisted thoracoscopic left lower lobe wedge resection    Diagnosis:Lung nodule < 6cm on CT [R91.1]  Diagnosis Additional Information: No value filed.    Anesthesia Type:  No value filed.    Note:  Disposition: Admission   Postop Pain Control: Uneventful            Sign Out: Well controlled pain   PONV: No   Neuro/Psych: Uneventful            Sign Out: Acceptable/Baseline neuro status   Airway/Respiratory: Uneventful            Sign Out: Acceptable/Baseline resp. status   CV/Hemodynamics: Uneventful            Sign Out: Acceptable CV status; No obvious hypovolemia; No obvious fluid overload   Other NRE: NONE   DID A NON-ROUTINE EVENT OCCUR? No           Last vitals:  Vitals Value Taken Time   BP 97/59 07/28/21 1230   Temp 36  C (96.8  F) 07/28/21 1115   Pulse 57 07/28/21 1231   Resp 14 07/28/21 1115   SpO2 99 % 07/28/21 1231   Vitals shown include unvalidated device data.    Electronically Signed By: Rafi Escobar MD  July 28, 2021  12:33 PM

## 2021-07-28 NOTE — PLAN OF CARE
Admitted/transferred from: PACU post   2 RN full   skin assessment completed by Marleen López, BRENDAN and Miriam OROZCO RN.  Skin assessment finding: issues found Redness from left knee (per patient from a bee bite), lab sites to back x3 and one under left breast with liquid bandage. Left chest tube to water seal.    Interventions/actions: skin interventions monitor laps site, open to air. chest tube dressing in place monitor for drainage.     Will continue to monitor.

## 2021-07-28 NOTE — ANESTHESIA CARE TRANSFER NOTE
Patient: Kitty Bangura    Procedure(s):  Robot-assisted thoracoscopic left lower lobe wedge resection    Diagnosis: Lung nodule < 6cm on CT [R91.1]  Diagnosis Additional Information: No value filed.    Anesthesia Type:   No value filed.     Note:    Oropharynx: spontaneously breathing  Level of Consciousness: awake  Oxygen Supplementation: nasal cannula  Level of Supplemental Oxygen (L/min / FiO2): 3  Independent Airway: airway patency satisfactory and stable  Dentition: dentition unchanged  Vital Signs Stable: post-procedure vital signs reviewed and stable  Report to RN Given: handoff report given  Patient transferred to: PACU    Handoff Report: Identifed the Patient, Identified the Reponsible Provider, Reviewed the pertinent medical history, Discussed the surgical course, Reviewed Intra-OP anesthesia mangement and issues during anesthesia, Set expectations for post-procedure period and Allowed opportunity for questions and acknowledgement of understanding      Vitals:  Vitals Value Taken Time   BP 95/51    Temp 36.0    Pulse 58 07/28/21 1118   Resp 14    SpO2 95 % 07/28/21 1118   Vitals shown include unvalidated device data.    Electronically Signed By: TYLER Hill CRNA  July 28, 2021  11:19 AM

## 2021-07-29 ENCOUNTER — APPOINTMENT (OUTPATIENT)
Dept: OCCUPATIONAL THERAPY | Facility: CLINIC | Age: 61
DRG: 168 | End: 2021-07-29
Attending: PHYSICIAN ASSISTANT
Payer: COMMERCIAL

## 2021-07-29 ENCOUNTER — APPOINTMENT (OUTPATIENT)
Dept: GENERAL RADIOLOGY | Facility: CLINIC | Age: 61
DRG: 168 | End: 2021-07-29
Attending: PHYSICIAN ASSISTANT
Payer: COMMERCIAL

## 2021-07-29 ENCOUNTER — DOCUMENTATION ONLY (OUTPATIENT)
Facility: CLINIC | Age: 61
End: 2021-07-29

## 2021-07-29 VITALS
BODY MASS INDEX: 22.55 KG/M2 | HEART RATE: 60 BPM | RESPIRATION RATE: 18 BRPM | SYSTOLIC BLOOD PRESSURE: 94 MMHG | WEIGHT: 135.36 LBS | DIASTOLIC BLOOD PRESSURE: 62 MMHG | OXYGEN SATURATION: 97 % | HEIGHT: 65 IN | TEMPERATURE: 98.4 F

## 2021-07-29 PROCEDURE — 97165 OT EVAL LOW COMPLEX 30 MIN: CPT | Mod: GO

## 2021-07-29 PROCEDURE — 250N000013 HC RX MED GY IP 250 OP 250 PS 637

## 2021-07-29 PROCEDURE — 97530 THERAPEUTIC ACTIVITIES: CPT | Mod: GO

## 2021-07-29 PROCEDURE — 71045 X-RAY EXAM CHEST 1 VIEW: CPT | Mod: 76

## 2021-07-29 PROCEDURE — 999N000147 HC STATISTIC PT IP EVAL DEFER

## 2021-07-29 PROCEDURE — 71045 X-RAY EXAM CHEST 1 VIEW: CPT

## 2021-07-29 PROCEDURE — 71045 X-RAY EXAM CHEST 1 VIEW: CPT | Mod: 26 | Performed by: RADIOLOGY

## 2021-07-29 PROCEDURE — 250N000013 HC RX MED GY IP 250 OP 250 PS 637: Performed by: PHYSICIAN ASSISTANT

## 2021-07-29 PROCEDURE — 250N000011 HC RX IP 250 OP 636: Performed by: PHYSICIAN ASSISTANT

## 2021-07-29 RX ORDER — SIMETHICONE 80 MG
80 TABLET,CHEWABLE ORAL ONCE
Status: COMPLETED | OUTPATIENT
Start: 2021-07-29 | End: 2021-07-29

## 2021-07-29 RX ORDER — POLYETHYLENE GLYCOL 3350 17 G/17G
17 POWDER, FOR SOLUTION ORAL DAILY
Qty: 7 PACKET | Refills: 0 | Status: SHIPPED | OUTPATIENT
Start: 2021-07-30 | End: 2022-05-27

## 2021-07-29 RX ORDER — IBUPROFEN 600 MG/1
600 TABLET, FILM COATED ORAL EVERY 6 HOURS PRN
COMMUNITY
Start: 2021-07-29 | End: 2022-11-11

## 2021-07-29 RX ORDER — METHOCARBAMOL 750 MG/1
750 TABLET, FILM COATED ORAL EVERY 6 HOURS PRN
Qty: 20 TABLET | Refills: 0 | Status: SHIPPED | OUTPATIENT
Start: 2021-07-29 | End: 2021-08-26

## 2021-07-29 RX ORDER — OXYCODONE HYDROCHLORIDE 5 MG/1
5 TABLET ORAL EVERY 4 HOURS PRN
Qty: 40 TABLET | Refills: 0 | Status: SHIPPED | OUTPATIENT
Start: 2021-07-29 | End: 2021-11-09

## 2021-07-29 RX ORDER — TIZANIDINE 2 MG/1
2 TABLET ORAL 3 TIMES DAILY PRN
Qty: 15 TABLET | Refills: 0 | Status: SHIPPED | OUTPATIENT
Start: 2021-07-29 | End: 2021-08-11

## 2021-07-29 RX ORDER — AMOXICILLIN 250 MG
1 CAPSULE ORAL 2 TIMES DAILY
Qty: 14 TABLET | Refills: 0 | Status: SHIPPED | OUTPATIENT
Start: 2021-07-29 | End: 2021-08-26

## 2021-07-29 RX ADMIN — HYDROMORPHONE HYDROCHLORIDE 0.2 MG: 0.2 INJECTION, SOLUTION INTRAMUSCULAR; INTRAVENOUS; SUBCUTANEOUS at 01:31

## 2021-07-29 RX ADMIN — OXYCODONE HYDROCHLORIDE 10 MG: 10 TABLET ORAL at 07:54

## 2021-07-29 RX ADMIN — OXYCODONE HYDROCHLORIDE 10 MG: 10 TABLET ORAL at 03:49

## 2021-07-29 RX ADMIN — METHOCARBAMOL 750 MG: 750 TABLET ORAL at 11:34

## 2021-07-29 RX ADMIN — PANCRELIPASE 4 CAPSULE: 24000; 76000; 120000 CAPSULE, DELAYED RELEASE PELLETS ORAL at 07:54

## 2021-07-29 RX ADMIN — PANCRELIPASE 4 CAPSULE: 24000; 76000; 120000 CAPSULE, DELAYED RELEASE PELLETS ORAL at 11:50

## 2021-07-29 RX ADMIN — METHOCARBAMOL 750 MG: 750 TABLET ORAL at 05:18

## 2021-07-29 RX ADMIN — ACETAMINOPHEN 1000 MG: 500 TABLET, FILM COATED ORAL at 10:08

## 2021-07-29 RX ADMIN — HYDROMORPHONE HYDROCHLORIDE 0.2 MG: 0.2 INJECTION, SOLUTION INTRAMUSCULAR; INTRAVENOUS; SUBCUTANEOUS at 04:34

## 2021-07-29 RX ADMIN — DOCUSATE SODIUM 50 MG AND SENNOSIDES 8.6 MG 1 TABLET: 8.6; 5 TABLET, FILM COATED ORAL at 07:54

## 2021-07-29 RX ADMIN — ENOXAPARIN SODIUM 40 MG: 40 INJECTION SUBCUTANEOUS at 07:54

## 2021-07-29 RX ADMIN — PANTOPRAZOLE SODIUM 40 MG: 40 TABLET, DELAYED RELEASE ORAL at 07:54

## 2021-07-29 RX ADMIN — POLYETHYLENE GLYCOL 3350 17 G: 17 POWDER, FOR SOLUTION ORAL at 07:53

## 2021-07-29 RX ADMIN — ACETAMINOPHEN 1000 MG: 500 TABLET, FILM COATED ORAL at 03:48

## 2021-07-29 RX ADMIN — OXYCODONE HYDROCHLORIDE 10 MG: 10 TABLET ORAL at 11:50

## 2021-07-29 RX ADMIN — SIMETHICONE 80 MG: 80 TABLET, CHEWABLE ORAL at 04:34

## 2021-07-29 ASSESSMENT — ACTIVITIES OF DAILY LIVING (ADL)
ADLS_ACUITY_SCORE: 15
PREVIOUS_RESPONSIBILITIES: MEAL PREP;HOUSEKEEPING;LAUNDRY;SHOPPING;MEDICATION MANAGEMENT;FINANCES;DRIVING;YARDWORK

## 2021-07-29 NOTE — PLAN OF CARE
7B PT: Defer: Orders received and appreciated. Per discussion with OT and chart review, pt has been up ind with spouse. No concerns or acute PT needs identified. OT to follow, PT will complete orders. Potential for discharge home this afternoon. Thank you.

## 2021-07-29 NOTE — PLAN OF CARE
Resident on call Scar paged to inform of crepitus to left upper chest. Call return by resident advice to continue to monitor patient vital and update if there is any change. Will continue to monitor patient.

## 2021-07-29 NOTE — PROGRESS NOTES
07/29/21 1400   Quick Adds   Type of Visit Initial Occupational Therapy Evaluation   Living Environment   People in home spouse   Current Living Arrangements house   Home Accessibility stairs to enter home   Number of Stairs, Main Entrance 2   Stair Railings, Main Entrance none   Transportation Anticipated car, drives self;family or friend will provide   Living Environment Comments Pt lives in a house with her , reports there are 2 stairs to enter the home then all needs can be met on the main level of the home.    Self-Care   Usual Activity Tolerance excellent   Current Activity Tolerance moderate   Regular Exercise Yes   Activity/Exercise Type walking   Exercise Amount/Frequency daily   Equipment Currently Used at Home none   Activity/Exercise/Self-Care Comment Pt does not use AE at baseline, very active and walks up to 3-4 miles per day.    Instrumental Activities of Daily Living (IADL)   Previous Responsibilities meal prep;housekeeping;laundry;shopping;medication management;finances;driving;yardwork   IADL Comments IND with all IADLs.    Disability/Function   Hearing Difficulty or Deaf no   Wear Glasses or Blind yes   Vision Management glasses   Concentrating, Remembering or Making Decisions Difficulty no   Difficulty Communicating no   Difficulty Eating/Swallowing no   Walking or Climbing Stairs Difficulty no   Dressing/Bathing Difficulty no   Toileting issues no   Doing Errands Independently Difficulty (such as shopping) no   Fall history within last six months no   Change in Functional Status Since Onset of Current Illness/Injury yes   General Information   Onset of Illness/Injury or Date of Surgery 07/28/21   Referring Physician Madhu Mayfield PA-C   Patient/Family Therapy Goal Statement (OT) return home   Additional Occupational Profile Info/Pertinent History of Current Problem Per chart: Kitty Bangura is a 60 year old female who on 7/28/2021 underwent the above-named procedures. She  tolerated the operation well and postoperatively was transferred to the general post-surgical unit.  The remainder of her course was essentially uncomplicated.    Performance Patterns (Routines, Roles, Habits) Pt is independent with all functional mobility and ADLs/IADLs within home environment.    Existing Precautions/Restrictions   (thoracotomy)   Left Upper Extremity (Weight-bearing Status) full weight-bearing (FWB)   Right Upper Extremity (Weight-bearing Status) full weight-bearing (FWB)   Left Lower Extremity (Weight-bearing Status) full weight-bearing (FWB)   Right Lower Extremity (Weight-bearing Status) full weight-bearing (FWB)   Cognitive Status Examination   Orientation Status orientation to person, place and time   Cognitive Status Comments No cognition concerns noted.    Visual Perception   Impact of Vision Impairment on Function (Vision) Wears glasses all the time.    Sensory   Sensory Quick Adds No deficits were identified   Pain Assessment   Patient Currently in Pain Yes, see Vital Sign flowsheet   Integumentary/Edema   Integumentary/Edema no deficits were identifed   Posture   Posture not impaired   Range of Motion Comprehensive   General Range of Motion no range of motion deficits identified   Strength Comprehensive (MMT)   General Manual Muscle Testing (MMT) Assessment no strength deficits identified   Coordination   Upper Extremity Coordination No deficits were identified   Gross Motor Coordination No deficits identified   Fine Motor Coordination Not tested   Bed Mobility   Comment (Bed Mobility) SBA   Transfers   Transfer Comments SBA   Balance   Balance Comments No loss of balance with ambulation   Clinical Impression   Criteria for Skilled Therapeutic Interventions Met (OT) yes;skilled treatment is necessary   OT Diagnosis decreased IADL-I   OT Problem List-Impairments impacting ADL problems related to;activity tolerance impaired;pain;post-surgical precautions   Assessment of Occupational  Performance 1-3 Performance Deficits   Identified Performance Deficits home management, community mobility, aerobic exercise   Planned Therapy Interventions (OT) home program guidelines;progressive activity/exercise;strengthening   Clinical Decision Making Complexity (OT) low complexity   Therapy Frequency (OT) 1x eval and treat   Predicted Duration of Therapy 7/29/2021   Risk & Benefits of therapy have been explained evaluation/treatment results reviewed;care plan/treatment goals reviewed;risks/benefits reviewed;current/potential barriers reviewed;participants voiced agreement with care plan;participants included;patient   OT Discharge Planning    OT Discharge Recommendation (DC Rec) Home with assist   OT Rationale for DC Rec Pt is doing well post-operatively, would be safe to dsicharge to home with assist from  for heavier IADLs once medically stable.    OT Brief overview of current status  SBA    Total Evaluation Time (Minutes)   Total Evaluation Time (Minutes) 3

## 2021-07-29 NOTE — PLAN OF CARE
VSS. Patient discharged at 1430 to home with family.  Belongings sent with patient. PIVs removed.   To [place] [facility] for medications. AVS signed and printed.

## 2021-07-29 NOTE — PROGRESS NOTES
"/59 (BP Location: Left arm)   Pulse 55   Temp 97.7  F (36.5  C) (Oral)   Resp 18   Ht 1.651 m (5' 5\")   Wt 61.4 kg (135 lb 5.8 oz)   SpO2 99%   BMI 22.53 kg/m      Shift time: 0433-5233      Neuros: A&Ox4. Calls appropriately.  Cardiac: WDL. This am pt stated that she had this sharp pain in back shoulder that is constant by the incision, now it is radiating to the front. Service notified.   Respiratory: WDL, denies any SOB.   GI/Gu: BS+, no BM this shift. Voiding with bedside commode.   Skin/Incisions: laps sites dermabonded. CT to water seal.   Pain: CT pain and incisional pain. PRN oxy, dilaudid and robaxin given with partial relief.   Diet: Clears.   Activity: Assist of x2/x1 to commode.   Plan: Continue with POC.       "

## 2021-07-29 NOTE — DISCHARGE SUMMARY
NAME: Kitty Bangura   MRN: [id number]   : 1960     DATE OF ADMISSION: 2021     PRE/POSTOPERATIVE DIAGNOSES: Lung nodule    PROCEDURES PERFORMED: Left robot-assisted lower lobe wedge resection - 2021    PATHOLOGY RESULTS:   Preliminary intraoperative results - benign granuloma  Final results pending at time of discharge    CULTURE RESULTS: None     INTRAOPERATIVE COMPLICATIONS: None     POSTOPERATIVE COMPLICATIONS: None     DRAINS/TUBES PRESENT AT DISCHARGE: dressing changes    DATE OF DISCHARGE:  2021    HOSPITAL COURSE: Kitty Bangura is a 60 year old female who on 2021 underwent the above-named procedures. She tolerated the operation well and postoperatively was transferred to the general post-surgical unit.  The remainder of her course was essentially uncomplicated.  Prior to discharge, her pain was controlled well, she was able to perform ADLs and ambulate independently without difficulty.  On , she was discharged home in stable condition.    DISCHARGE EXAM:   A&O, NAD  Resp non-labored  Distal extremities warm    Incisions CDI  Chest tube site without concerns     DISCHARGE INSTRUCTIONS:  Discharge Procedure Orders   Reason for your hospital stay   Order Comments: Thoracic surgery     Adult [facility] Follow-up and recommended labs and tests   Order Comments: 1.) Follow up with primary care physician, Solange Mcgill, in 1-2 weeks.  2.) Follow up with a thoracic surgery Clinical Nurse Specialist in Thoracic Surgery clinic in 1 month, prior to which a CXR should be performed.    Appointments on [place] and/or [place] campus (with [facility] or [facility] provider or service). Call [phone] if you haven't heard regarding these appointments within 7 days of discharge.     Discharge Instructions   Order Comments: THORACIC SURGERY DISCHARGE INSTRUCTIONS    DIET: Regular diet - as prior to admission     If your plans upon discharge include prolonged periods of sitting (i.e a  "lengthy car or plane ride), it is highly beneficial to get up and walk at least once per hour to help prevent swelling and blood clots.     You may remove chest tube dressing 48 hours after tube removal and bandage the site at your own discretion thereafter.  Small amounts of leakage are normal for 2-3 days after removal.  Feel free to call with questions.    You may get incision wet 2 days after operation. Do not submerge, soak, or scrub incision or swim until seen in follow-up.    Take incentive spirometer home for continued frequent use    Activity as tolerated, no strenous activity until seen in follow-up, no lifting greater than 20 pounds for the next 2 weeks.    Stay hydrated. Take over the counter fiber (metamucil or benefiber) and stool softeners (Miralax, docusate or senna) if becoming constipated.     Call for fever greater than 101.5, chills, increased size of incision, red skin around incision, vision changes, muscle strength changes, sensation changes, shortness of breath, or other concerns.    No driving while taking narcotic pain medication.    Transition to ibuprofen or tylenol/acetaminophen for pain control. Do not take tylenol/acetaminophen and acetaminophen containing narcotic (e.g., percocet or vicodin) at the same time. If you have known ulcer problems, or kidney trouble (elevated creatinine) do not take the ibuprofen.    In emergencies, call 911    For other Questions or Concerns;   A.) During weekday working hours (Monday through Friday 8am to 4:30pm)   call [phone] and ask to speak to a clinical nurse specialist.     B.) At nights (after 4:30pm), on weekends, or if urgent call [phone] and   tell the  \"I would like to page job code [phone], the thoracic surgery   fellow on call, please.\"       DISCHARGE MEDICATIONS:   Current Discharge Medication List      START taking these medications    Details   ibuprofen (ADVIL/MOTRIN) 600 MG tablet Take 1 tablet (600 mg) by mouth " every 6 hours as needed    Associated Diagnoses: Lung nodule < 6cm on CT      oxyCODONE (ROXICODONE) 5 MG tablet Take 1 tablet (5 mg) by mouth every 4 hours as needed for moderate to severe pain  Qty: 40 tablet, Refills: 0    Associated Diagnoses: Lung nodule < 6cm on CT      polyethylene glycol (MIRALAX) 17 g packet Take 17 g by mouth daily  Qty: 7 packet, Refills: 0    Associated Diagnoses: Lung nodule < 6cm on CT      senna-docusate (SENOKOT-S/PERICOLACE) 8.6-50 MG tablet Take 1 tablet by mouth 2 times daily  Qty: 14 tablet, Refills: 0    Associated Diagnoses: Lung nodule < 6cm on CT         CONTINUE these medications which have NOT CHANGED    Details   amylase-lipase-protease (CREON) 23290-15858 units CPEP per EC capsule TAKE 4 CAPSULES WITH MEALS, 2 CAPSULES WITH SNACKS, UP TO 16 CAPSULES PER DAY  Qty: 1440 capsule, Refills: 3    Associated Diagnoses: Necrotizing pancreatitis      blood glucose (ONETOUCH VERIO IQ) test strip Use to test blood sugar 4 times daily or as directed.  Qty: 400 each, Refills: 3    Associated Diagnoses: Type 2 diabetes mellitus with diabetic polyneuropathy, with long-term current use of insulin (H)      clobetasol (TEMOVATE) 0.05 % external ointment Apply topically 2 times daily  Qty: 45 g, Refills: 1    Associated Diagnoses: Vaginal itching; Atrophic vaginitis      clobetasol (TEMOVATE) 0.05 % GEL topical gel Apply topically 2 times daily  Qty: 15 g, Refills: 1    Associated Diagnoses: Stomatitis and mucositis      gabapentin (NEURONTIN) 100 MG capsule Take 1 capsule (100 mg) by mouth 3 times daily  Qty: 90 capsule, Refills: 1    Associated Diagnoses: Stomatitis and mucositis      metFORMIN (GLUCOPHAGE-XR) 500 MG 24 hr tablet Take 1 tablet (500 mg) by mouth 2 times daily (with meals)  Qty: 180 tablet, Refills: 3    Associated Diagnoses: Type 2 diabetes mellitus without complication, without long-term current use of insulin (H)      OneTouch Delica Lancets 33G MISC 4 lancets  daily  Qty: 150 each, Refills: 3    Associated Diagnoses: Type 2 diabetes mellitus with diabetic polyneuropathy, with long-term current use of insulin (H)      traZODone (DESYREL) 50 MG tablet Take 1-2 tablets ( mg) by mouth At Bedtime  Qty: 60 tablet, Refills: 3    Associated Diagnoses: Insomnia due to medical condition      acetaminophen (TYLENOL) 500 MG tablet Take 2 tablets (1,000 mg) by mouth every 8 hours  Qty: 100 tablet, Refills: 1    Associated Diagnoses: Acute post-operative pain                  FINAL PATH:    Carcinoid tumorlet 0.3 cm- will discuss at tumor board. Unlikely to need further intervention    Granulomatous inflammation

## 2021-07-29 NOTE — PROVIDER NOTIFICATION
Writer paged thoracic, repeat page- Pt has had increase in pain on back L shoulder blade, were incision is states its sharp and is now radiating to the front. Even after dilaudid, oxy and robaxin. Thank you, Blanquita [id number]

## 2021-07-29 NOTE — PROVIDER NOTIFICATION
Writer paged thoracic. Pts BP is high/mid 90's over high 50's, HR is 50-60bpm. pt said she feels slightly light headed. Thank you, raciel [phone]

## 2021-07-29 NOTE — PROVIDER NOTIFICATION
Writer paged thoracic.. Pt has had increase in pain on back L shoulder blade, were incision is states its sharp and is now radiating to the front. Thank you, Blanquita [id number]

## 2021-07-30 ENCOUNTER — PATIENT OUTREACH (OUTPATIENT)
Dept: ONCOLOGY | Facility: CLINIC | Age: 61
End: 2021-07-30

## 2021-07-30 NOTE — PROGRESS NOTES
Prior Authorization Denial    methocarbamol 750mg tabs  Date Initiated: 7/29/2021  Date Completed: 7/29/2021  Prior Auth Type: Non-Formulary                Status: Denied    Denial Date: 07/29/2021   Denial Reasoning: Pt needs to try formulary alternative tizanidine prior to approval of non-formulary methocarbamol.    Insurance: [facility] - Phone [phone] Fax [phone]  ID: [id number]  Case Number: [id number] / [id number]   Submitted Via: Jacques Zazueta  [facility] Liaison  Ph: [phone] Pager: [phone]

## 2021-08-02 LAB
PATH REPORT.ADDENDUM SPEC: NORMAL
PATH REPORT.COMMENTS IMP SPEC: NORMAL
PATH REPORT.COMMENTS IMP SPEC: NORMAL
PATH REPORT.FINAL DX SPEC: NORMAL
PATH REPORT.GROSS SPEC: NORMAL
PATH REPORT.INTRAOP OBS SPEC DOC: NORMAL
PATH REPORT.MICROSCOPIC SPEC OTHER STN: NORMAL
PATH REPORT.RELEVANT HX SPEC: NORMAL
PHOTO IMAGE: NORMAL

## 2021-08-03 ENCOUNTER — VIRTUAL VISIT (OUTPATIENT)
Dept: OTOLARYNGOLOGY | Facility: CLINIC | Age: 61
End: 2021-08-03
Payer: COMMERCIAL

## 2021-08-03 VITALS — BODY MASS INDEX: 21.53 KG/M2 | HEIGHT: 66 IN | WEIGHT: 134 LBS

## 2021-08-03 DIAGNOSIS — L43.8 ORAL LICHEN PLANUS: Primary | ICD-10-CM

## 2021-08-03 PROCEDURE — 99213 OFFICE O/P EST LOW 20 MIN: CPT | Mod: 95 | Performed by: OTOLARYNGOLOGY

## 2021-08-03 ASSESSMENT — PAIN SCALES - GENERAL: PAINLEVEL: SEVERE PAIN (7)

## 2021-08-03 ASSESSMENT — MIFFLIN-ST. JEOR: SCORE: 1184.57

## 2021-08-03 NOTE — PROGRESS NOTES
Kitty is 60 years of age.  She is interesting in that she had oral lichen planus-type complaints.  When she came to me, she was having almost like a neuralgia kind of craniofacial complaints.  We put her on gabapentin 100 mg a day, gabapentin at night and she seems to be almost completely resolved with her symptoms at the present time, which is really quite good.  She will sometimes take 200 mg of gabapentin.  She is happy to do that.  She has like to get this renewed.  She is under a fair amount of other medical treatments at the present time, so we are going to try to renew her gabapentin and then I am going to see her again in one year for her oral lichen planus.  If she feels like she is having breakthrough of either of these symptoms, then she can come and see us sooner.  She will do that if that is the case. Other than that, we will see her again in one year.        We had about 20 minutes on the phone with her today.

## 2021-08-03 NOTE — NURSING NOTE
"Chief Complaint   Patient presents with     RECHECK     12 week follow up       Height 1.676 m (5' 6\"), weight 60.8 kg (134 lb), not currently breastfeeding.    Kristina Esquivel, EMT    "

## 2021-08-03 NOTE — LETTER
8/3/2021       RE: Kitty Bangura  [id number] [facility] [id number]     Dear Colleague,    Thank you for referring your patient, Kitty Bangura, to the [facility] [place] [facility]. Please see a copy of my visit note below.    Kitty is 60 years of age.  She is interesting in that she had oral lichen planus-type complaints.  When she came to me, she was having almost like a neuralgia kind of craniofacial complaints.  We put her on gabapentin 100 mg a day, gabapentin at night and she seems to be almost completely resolved with her symptoms at the present time, which is really quite good.  She will sometimes take 200 mg of gabapentin.  She is happy to do that.  She has like to get this renewed.  She is under a fair amount of other medical treatments at the present time, so we are going to try to renew her gabapentin and then I am going to see her again in one year for her oral lichen planus.  If she feels like she is having breakthrough of either of these symptoms, then she can come and see us sooner.  She will do that if that is the case. Other than that, we will see her again in one year.        We had about 20 minutes on the phone with her today.          Again, thank you for allowing me to participate in the care of your patient.      Sincerely,    Enrique Ward MD

## 2021-08-06 ENCOUNTER — TELEPHONE (OUTPATIENT)
Dept: ONCOLOGY | Facility: CLINIC | Age: 61
End: 2021-08-06

## 2021-08-06 ENCOUNTER — HOSPITAL ENCOUNTER (EMERGENCY)
Facility: CLINIC | Age: 61
Discharge: HOME | End: 2021-08-06
Attending: EMERGENCY MEDICINE | Admitting: EMERGENCY MEDICINE
Payer: COMMERCIAL

## 2021-08-06 ENCOUNTER — APPOINTMENT (OUTPATIENT)
Dept: CT IMAGING | Facility: CLINIC | Age: 61
End: 2021-08-06
Attending: EMERGENCY MEDICINE
Payer: COMMERCIAL

## 2021-08-06 VITALS
BODY MASS INDEX: 21.53 KG/M2 | TEMPERATURE: 97 F | OXYGEN SATURATION: 97 % | HEIGHT: 66 IN | SYSTOLIC BLOOD PRESSURE: 130 MMHG | DIASTOLIC BLOOD PRESSURE: 77 MMHG | HEART RATE: 62 BPM | RESPIRATION RATE: 18 BRPM | WEIGHT: 134 LBS

## 2021-08-06 DIAGNOSIS — G89.18 POSTOPERATIVE PAIN: ICD-10-CM

## 2021-08-06 LAB
ALBUMIN SERPL-MCNC: 3.7 G/DL (ref 3.4–5)
ALP SERPL-CCNC: 73 U/L (ref 40–150)
ALT SERPL W P-5'-P-CCNC: 30 U/L (ref 0–50)
ANION GAP SERPL CALCULATED.3IONS-SCNC: 6 MMOL/L (ref 3–14)
AST SERPL W P-5'-P-CCNC: 16 U/L (ref 0–45)
BASOPHILS # BLD AUTO: 0.1 10E3/UL (ref 0–0.2)
BASOPHILS NFR BLD AUTO: 1 %
BILIRUB SERPL-MCNC: 0.4 MG/DL (ref 0.2–1.3)
BUN SERPL-MCNC: 14 MG/DL (ref 7–30)
CALCIUM SERPL-MCNC: 9.4 MG/DL (ref 8.5–10.1)
CHLORIDE BLD-SCNC: 103 MMOL/L (ref 94–109)
CO2 SERPL-SCNC: 28 MMOL/L (ref 20–32)
CREAT SERPL-MCNC: 0.61 MG/DL (ref 0.52–1.04)
EOSINOPHIL # BLD AUTO: 0.2 10E3/UL (ref 0–0.7)
EOSINOPHIL NFR BLD AUTO: 2 %
ERYTHROCYTE [DISTWIDTH] IN BLOOD BY AUTOMATED COUNT: 13.2 % (ref 10–15)
GFR SERPL CREATININE-BSD FRML MDRD: >90 ML/MIN/1.73M2
GLUCOSE BLD-MCNC: 76 MG/DL (ref 70–99)
HCT VFR BLD AUTO: 35.6 % (ref 35–47)
HGB BLD-MCNC: 11.8 G/DL (ref 11.7–15.7)
IMM GRANULOCYTES # BLD: 0 10E3/UL
IMM GRANULOCYTES NFR BLD: 0 %
LYMPHOCYTES # BLD AUTO: 4 10E3/UL (ref 0.8–5.3)
LYMPHOCYTES NFR BLD AUTO: 38 %
MCH RBC QN AUTO: 31.6 PG (ref 26.5–33)
MCHC RBC AUTO-ENTMCNC: 33.1 G/DL (ref 31.5–36.5)
MCV RBC AUTO: 95 FL (ref 78–100)
MONOCYTES # BLD AUTO: 1.4 10E3/UL (ref 0–1.3)
MONOCYTES NFR BLD AUTO: 13 %
NEUTROPHILS # BLD AUTO: 4.8 10E3/UL (ref 1.6–8.3)
NEUTROPHILS NFR BLD AUTO: 46 %
NRBC # BLD AUTO: 0 10E3/UL
NRBC BLD AUTO-RTO: 0 /100
PLATELET # BLD AUTO: 466 10E3/UL (ref 150–450)
POTASSIUM BLD-SCNC: 4.1 MMOL/L (ref 3.4–5.3)
PROT SERPL-MCNC: 7.2 G/DL (ref 6.8–8.8)
RBC # BLD AUTO: 3.74 10E6/UL (ref 3.8–5.2)
SODIUM SERPL-SCNC: 137 MMOL/L (ref 133–144)
WBC # BLD AUTO: 10.5 10E3/UL (ref 4–11)

## 2021-08-06 PROCEDURE — 85004 AUTOMATED DIFF WBC COUNT: CPT | Performed by: EMERGENCY MEDICINE

## 2021-08-06 PROCEDURE — 250N000011 HC RX IP 250 OP 636: Performed by: EMERGENCY MEDICINE

## 2021-08-06 PROCEDURE — 99284 EMERGENCY DEPT VISIT MOD MDM: CPT | Performed by: EMERGENCY MEDICINE

## 2021-08-06 PROCEDURE — 36415 COLL VENOUS BLD VENIPUNCTURE: CPT | Performed by: EMERGENCY MEDICINE

## 2021-08-06 PROCEDURE — 74177 CT ABD & PELVIS W/CONTRAST: CPT

## 2021-08-06 PROCEDURE — 82247 BILIRUBIN TOTAL: CPT | Performed by: EMERGENCY MEDICINE

## 2021-08-06 PROCEDURE — 250N000009 HC RX 250: Performed by: EMERGENCY MEDICINE

## 2021-08-06 PROCEDURE — 82040 ASSAY OF SERUM ALBUMIN: CPT | Performed by: EMERGENCY MEDICINE

## 2021-08-06 PROCEDURE — 99285 EMERGENCY DEPT VISIT HI MDM: CPT | Mod: 25 | Performed by: EMERGENCY MEDICINE

## 2021-08-06 RX ORDER — HYDROMORPHONE HYDROCHLORIDE 2 MG/1
2 TABLET ORAL EVERY 6 HOURS PRN
Qty: 15 TABLET | Refills: 0 | Status: SHIPPED | OUTPATIENT
Start: 2021-08-06 | End: 2021-08-11

## 2021-08-06 RX ORDER — IOPAMIDOL 755 MG/ML
66 INJECTION, SOLUTION INTRAVASCULAR ONCE
Status: COMPLETED | OUTPATIENT
Start: 2021-08-06 | End: 2021-08-06

## 2021-08-06 RX ADMIN — SODIUM CHLORIDE 56 ML: 9 INJECTION, SOLUTION INTRAVENOUS at 13:20

## 2021-08-06 RX ADMIN — IOPAMIDOL 66 ML: 755 INJECTION, SOLUTION INTRAVENOUS at 13:20

## 2021-08-06 ASSESSMENT — MIFFLIN-ST. JEOR: SCORE: 1184.57

## 2021-08-06 NOTE — DISCHARGE INSTRUCTIONS
Dilaudid 2 mg every 4 hours as needed.  You may take ibuprofen as well.    Follow-up surgery as scheduled

## 2021-08-06 NOTE — TELEPHONE ENCOUNTER
"S: Pt concerned about how much pain she is having this far out from her biopsy.    B: Pt reports that she had a biopsy on 7/28, and still is having a lot of pain, especially at night. This happens every night.  Pt states the pain is in her abdomen-about two inches below her left ribs, she takes two tabs oxycodone, and sleeps on ice--goes through multiple ice packs. Pain starts in the afternoon and becomes sharp and stabbing by early evening and continues to increase to 10/10, and goes on all night long. One night, she became nauseous from the pain, but she did not vomit. She reports additional pain in her left side that radiates into her back and up into her left breast area. Using ice and two tabs of oxycodone helps, and she is usually able to get 2-3 hours of sleep.  Her abdomen is \"rock hard\" and distended, and feels bloated. She has tried walking around at night, but that provides no relief. Pt states that in addition to the oxycodone, she has been using dilaudid that was left over from her past surgery. Dilaudid does help lessen the pain.     During the day, the pain decreases about mid morning and her abdominal symptoms improve. She is walking and doing stretches.    Bowel movements are irregular. Since the procedure, she has had one day with 4 episodes of liquid diarrhea, no stool the next day, then today, she had one normal stool and two liquid diarrhea stools.    Pt reports having a fever of 99.7  And chills three nights after she was discharged. The fever resolved on Saturday (7/31), but she does have occasional chills without fever. Chills are not daily, but she does have them once in a while.     No redness or drainage around the incision site. Some slight puffiness around the site, but \"it doesn't look bad.\"    No chest pain, SOB or dizziness.    A: Paged Nikki Mcgrath to discuss.    Spoke with Nikki who advises pt to go to the ED to rule out bowel obstruction.    R: Spoke with Kitty and her "  and advised pt go to ED for evaluation. Told pt she should not be taking oxycodone and dilaudid at the same time. Pt acknowledged that she knows she shouldn't be taking them together. Pt informed that the concern is for a blockage in her intestines. Pt states she has taken Miralax and Senna since the procedure, but not consistently.   Pt states she will go to [facility].  Report called in to BRENDAN Betts, Charge Nurse in ED.    Message Routed to Nikki, Thoracic care team and [facility]CC.

## 2021-08-06 NOTE — ED PROVIDER NOTES
History     Chief Complaint   Patient presents with     Rib Pain     Pain 2 inches below rib cage post op. Left lower Wedge resection on July 28th  Pain mostly at night  Called clinic about pain which clinic called and stated she is coming to our ED     Diarrhea     diarrhea today.       HPI  Kitty Bangura is a 60 year old female who presents with a severe pressure pain sensation left upper quadrant left lower anterior thorax.  Status post left robot-assisted lung wedge resection 7/28.  Recuperating at home.  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  Reports pain as constant better when she is not moving, increasingly worse in the afternoon and overnight, barely sleeping.  Is tried oxycodone and Robaxin and ice and able to sleep about 3 hours.  Low-grade temp 99 7.  After she eats she becomes diffusely diaphoretic and hot.  Took some Dilaudid she had left over from 2018 for the last couple nights with minimal relief.  Bowel movements have been loose and watery over the past couple of days.    Allergies:  No Known Allergies    Problem List:    Patient Active Problem List    Diagnosis Date Noted     Leiomyoma of uterus, unspecified 07/26/2021     Priority: Medium     Formatting of this note might be different from the original.  Created by Conversion       s/p LLL wedge - 7/28 07/21/2021     Priority: Medium     Added automatically from request for surgery [id number]       Oral lichen planus 12/01/2019     Priority: Medium     Drug-induced polyneuropathy (H) 10/14/2019     Priority: Medium     Type 2 diabetes mellitus without complication, without long-term current use of insulin (H) 10/05/2018     Priority: Medium     Graduated from Endocrine Clinic 7/22/2019       Anemia 04/18/2018     Priority: Medium     Thrombocytopenia (H) 04/18/2018     Priority: Medium     Asplenia after surgical procedure 04/18/2018     Priority: Medium     Pancreatic adenocarcinoma (H) 11/29/2017     Priority: Medium      Leukocytosis 2017     Priority: Medium     Fatty liver 2017     Priority: Medium     Seen on US 2017          Past Medical History:    Past Medical History:   Diagnosis Date     Arthritis Post chemo     Diabetes (H)      History of blood transfusion      History of total splenectomy 2018     Necrotizing pancreatitis      Necrotizing pancreatitis 2017     Pancreatic cancer (H)      Pancreatic mass 2017     Pneumonia      PONV (postoperative nausea and vomiting)        Past Surgical History:    Past Surgical History:   Procedure Laterality Date     ABDOMEN SURGERY      Ruptured tubal pregnancies, additional surgeries followed     BACK SURGERY      L5, S1 discectomy     BREAST SURGERY      Breast reduction     C  DELIVERY ONLY      Description:  Section;  Recorded: 12/10/2009;     COLONOSCOPY       COLONOSCOPY N/A 2018    Procedure: colonoscopy;  Surgeon: Lobito Marte MD;  Location: [facility]     DAVINCI LOBECTOMY LUNG Left 2021    Procedure: Robot-assisted thoracoscopic left lower lobe wedge resection;  Surgeon: René Phillips MD;  [facility]     ENDOSCOPIC RETROGRADE CHOLANGIOPANCREATOGRAM N/A 2018    Procedure: COMBINED ENDOSCOPIC RETROGRADE CHOLANGIOPANCREATOGRAPHY, PLACE TUBE/STENT;  Endoscopic retrograde cholangiopancreatogram with stent placement and cyst drainage bile ;  Surgeon: Vito Sanches MD;  [facility]     ENDOSCOPIC ULTRASOUND LOWER GASTROINTESTIONAL TRACT (GI) N/A 2018    Procedure: ENDOSCOPIC ULTRASOUND LOWER GASTROINTESTIONAL TRACT (GI);  Endoscopic Ultrasound with guided Cyst-Gastrostomy;  Surgeon: González Metz MD;  [facility]     ENDOSCOPIC ULTRASOUND, ESOPHAGOSCOPY, GASTROSCOPY, DUODENOSCOPY (EGD), NECROSECTOMY N/A 2017    Procedure: ENDOSCOPIC ULTRASOUND, ESOPHAGOSCOPY, GASTROSCOPY, DUODENOSCOPY (EGD), NECROSECTOMY;  Esophagogastroduodenoscopy, with  Necrosectomy and stents  replacement  ;  Surgeon: González Metz MD;  [facility]     ENDOSCOPIC ULTRASOUND, ESOPHAGOSCOPY, GASTROSCOPY, DUODENOSCOPY (EGD), NECROSECTOMY N/A 2017    Procedure: ENDOSCOPIC ULTRASOUND, ESOPHAGOSCOPY, GASTROSCOPY, DUODENOSCOPY (EGD), NECROSECTOMY;  Esophagogastroduodenoscopy with Necrosectomy, Balloon Dilation, stent removal X3 and Cystgastrostomy stent Placement X2;  Surgeon: Guru Cecilia Staples MD;  [facility]     ESOPHAGOSCOPY, GASTROSCOPY, DUODENOSCOPY (EGD), COMBINED N/A 2017    Procedure: COMBINED ENDOSCOPIC ULTRASOUND, ESOPHAGOSCOPY, GASTROSCOPY, DUODENOSCOPY (EGD), FINE NEEDLE ASPIRATE/BIOPSY;  Endoscopic Ultrasound with cystgastrostomy and fine needle aspiration ;  Surgeon: González Metz MD;  [facility]     ESOPHAGOSCOPY, GASTROSCOPY, DUODENOSCOPY (EGD), COMBINED N/A 2018    Procedure: COMBINED ESOPHAGOSCOPY, GASTROSCOPY, DUODENOSCOPY (EGD);  EGD;  Surgeon: González Metz MD;  [facility]     ESOPHAGOSCOPY, GASTROSCOPY, DUODENOSCOPY (EGD), COMBINED N/A 2018    Procedure: COMBINED ESOPHAGOSCOPY, GASTROSCOPY, DUODENOSCOPY (EGD), REMOVE FOREIGN BODY;;  Surgeon: González Metz MD;  [facility]     GYN SURGERY  1983    Multiple ectopic pregnancies, reconnect,       INSERT PORT VASCULAR ACCESS Right 2018    Procedure: INSERT PORT VASCULAR ACCESS;  Chest Port Placement - right;  Surgeon: Chanda Lawson PA-C;  [facility]     IR PORT REMOVAL RIGHT  2019     LAPAROSCOPY DIAGNOSTIC (GENERAL) N/A 2018    Procedure: LAPAROSCOPY DIAGNOSTIC (GENERAL);  Diagnostic Laparoscopy; Open Distal Pancreatectomy And Splenectomy, splenic flexure mobilization, cholecystectomy, intraoperative ultrasound;  Surgeon: Ja Byrd MD;  [facility]     MAMMOPLASTY REDUCTION Bilateral 2006     PANCREATECTOMY, SPLENECTOMY N/A 2018    Procedure: PANCREATECTOMY, SPLENECTOMY;;  Surgeon: Ja Byrd MD;   [facility]     MT LAMNOTMY INCL W/DCMPRSN NRV ROOT 1 INTRSPC LUMBR      Description: Hemilaminectomy With Disc Removal One Lumbar Interspace;  Recorded: 2008;  Comments: Right L5-S1 with resultant loss of right patellar and achilles reflex     REMOVE PORT VASCULAR ACCESS Right 2019    Procedure: Right Port Removal;  Surgeon: Juan J Donaldson PA-C;  [facility]       Family History:    Family History   Problem Relation Age of Onset     Heart Disease Father      Hyperlipidemia Father      Heart Disease Brother      Diabetes Mother      Hypertension Mother      Obesity Mother      Depression Mother      Diabetes Maternal Grandfather      Hypertension Maternal Grandfather      Obesity Maternal Grandfather      Diabetes Sister      Hypertension Sister      Depression Sister      Anxiety Disorder Sister      Obesity Sister      Hypertension Brother      Hyperlipidemia Brother      Heart Disease Brother      Breast Cancer Cousin      Breast Cancer Cousin      Breast Cancer Cousin      Colon Cancer Other      Other Cancer Other         Mesothelioma     Depression Sister      Anxiety Disorder Brother      Cancer Brother 64        colon cancer     Anxiety Disorder Son      Depression Son      Mental Illness Sister         Schizophrenia     Hypertension Sister      Obesity Maternal Grandmother      Obesity Sister      Diabetes Nephew      Hypertension Brother        Social History:  Marital Status:   [2]  Social History     Tobacco Use     Smoking status: Former Smoker     Packs/day: 0.50     Years: 5.00     Pack years: 2.50     Types: Cigarettes     Start date: 1974     Quit date:      Years since quittin.6     Smokeless tobacco: Never Used   Substance Use Topics     Alcohol use: No     Alcohol/week: 21.0 standard drinks     Comment: Now quit since Oct 31.  Moderate drinker in past     Drug use: No        Medications:    HYDROmorphone (DILAUDID) 2 MG tablet  acetaminophen (TYLENOL)  "500 MG tablet  amylase-lipase-protease (CREON) 24472-33842 units CPEP per EC capsule  blood glucose (ONETOUCH VERIO IQ) test strip  clobetasol (TEMOVATE) 0.05 % external ointment  clobetasol (TEMOVATE) 0.05 % GEL topical gel  gabapentin (NEURONTIN) 100 MG capsule  ibuprofen (ADVIL/MOTRIN) 600 MG tablet  metFORMIN (GLUCOPHAGE-XR) 500 MG 24 hr tablet  methocarbamol (ROBAXIN) 750 MG tablet  OneTouch Delica Lancets 33G MISC  oxyCODONE (ROXICODONE) 5 MG tablet  polyethylene glycol (MIRALAX) 17 g packet  senna-docusate (SENOKOT-S/PERICOLACE) 8.6-50 MG tablet  tiZANidine (ZANAFLEX) 2 MG tablet  traZODone (DESYREL) 50 MG tablet          Review of Systems  All other systems reviewed and are negative.    Physical Exam   BP: 130/77  Pulse: 62  Temp: 97  F (36.1  C)  Resp: 18  Height: 167.6 cm (5' 6\")  Weight: 60.8 kg (134 lb)  SpO2: 97 %      Physical Exam  Nontoxic appearing no respiratory distress alert and oriented ×3  Head atraumatic normocephalic  Neck supple full active painless range of motion  Lungs clear to auscultation  Healing incisions over the left chest no redness induration or purulent discharge  Heart regular no murmur  Abdomen soft mild tenderness left upper quadrant without guarding or rebound  Strength and sensation grossly intact throughout the extremities, gait and station normal  Speech is fluent, good eye contact, thought processes are rational  Lower extremities without swelling, redness or tenderness  Pedal pulses symmetrical and strong    ED Course        Procedures              Critical Care time:  none     Results for orders placed or performed during the hospital encounter of 08/06/21   CT Chest/Abdomen/Pelvis w Contrast     Status: None    Narrative    CT CHEST/ABDOMEN/PELVIS WITH CONTRAST 8/6/2021 1:38 PM    CLINICAL HISTORY: Recent biopsy of left lung showed neuroendocrine  tumor. Patient now has severe pain. Evaluate for hematoma or abscess.    TECHNIQUE: CT scan of the chest, abdomen, and " pelvis was performed  following injection of IV contrast. Multiplanar reformats were  obtained. Dose reduction techniques were used.     CONTRAST: 66 mL Isovue 370    COMPARISON: 7/29/2021 and 7/22/2021.    FINDINGS:   LUNGS AND PLEURA: Post surgical changes are seen in the anterior left  lower lobe where the previously noted nodule was seen. In the superior  segment of left lower lobe there is a tubular area with some central  gas that may represent postsurgical changes as well. This is not a  typical appearance for abscess or hematoma. There is a 3 mm nodule in  the lateral aspect of the right upper lobe on image 103 of series 5.  There is a 3 mm nodule in the anterior right upper lobe on image 110.  There is a 2 mm superior segment left lower lobe nodule on image 148.  The central airways are patent. No effusion or pneumothorax.    MEDIASTINUM/AXILLAE: No evidence for axillary, mediastinal, or hilar  adenopathy. Gas is seen along the left chest wall and pectoralis  muscle.    HEPATOBILIARY: Slight biliary prominence is unchanged from the  previous exam. Two subcentimeter low-attenuation lesions are seen in  the right hepatic lobe, both measuring 9 mm. These are unchanged from  multiple previous studies and likely represent benign hemangiomas or  cysts. The gallbladder is absent.    PANCREAS: Distal pancreatectomy is noted. No evidence for enhancing  mass, atrophy, or ductal dilation through the residual pancreas.    SPLEEN: The spleen is surgically absent.    ADRENAL GLANDS: Normal.    KIDNEYS/BLADDER: Normal.    BOWEL: Normal.    PELVIC ORGANS: Normal.    ADDITIONAL FINDINGS: None.    MUSCULOSKELETAL: Normal.      Impression    IMPRESSION:  1.  Interval surgical changes to the left lower lobe consistent with  excision of the left lower lobe mass. There is additional tubular  change in the superior segment of the left lower lobe containing gas  that may be postsurgical as well.  2.  No evidence for hematoma or  abscess.  3.  Stable appearance of distal pancreatectomy and splenectomy.  4.  Two stable subcentimeter low-attenuation lesions in the right  hepatic lobe likely represent cysts or hemangiomas.    CHAMP LIM MD         SYSTEM ID:  [id number]   CBC with platelets differential     Status: Abnormal    Narrative    The following orders were created for panel order CBC with platelets differential.  Procedure                               Abnormality         Status                     ---------                               -----------         ------                     CBC with platelets and d...[[id number]]  Abnormal            Final result                 Please view results for these tests on the individual orders.   Comprehensive metabolic panel     Status: Normal   Result Value Ref Range    Sodium 137 133 - 144 mmol/L    Potassium 4.1 3.4 - 5.3 mmol/L    Chloride 103 94 - 109 mmol/L    Carbon Dioxide (CO2) 28 20 - 32 mmol/L    Anion Gap 6 3 - 14 mmol/L    Urea Nitrogen 14 7 - 30 mg/dL    Creatinine 0.61 0.52 - 1.04 mg/dL    Calcium 9.4 8.5 - 10.1 mg/dL    Glucose 76 70 - 99 mg/dL    Alkaline Phosphatase 73 40 - 150 U/L    AST 16 0 - 45 U/L    ALT 30 0 - 50 U/L    Protein Total 7.2 6.8 - 8.8 g/dL    Albumin 3.7 3.4 - 5.0 g/dL    Bilirubin Total 0.4 0.2 - 1.3 mg/dL    GFR Estimate >90 >60 mL/min/1.73m2   CBC with platelets and differential     Status: Abnormal   Result Value Ref Range    WBC Count 10.5 4.0 - 11.0 10e3/uL    RBC Count 3.74 (L) 3.80 - 5.20 10e6/uL    Hemoglobin 11.8 11.7 - 15.7 g/dL    Hematocrit 35.6 35.0 - 47.0 %    MCV 95 78 - 100 fL    MCH 31.6 26.5 - 33.0 pg    MCHC 33.1 31.5 - 36.5 g/dL    RDW 13.2 10.0 - 15.0 %    Platelet Count 466 (H) 150 - 450 10e3/uL    % Neutrophils 46 %    % Lymphocytes 38 %    % Monocytes 13 %    % Eosinophils 2 %    % Basophils 1 %    % Immature Granulocytes 0 %    NRBCs per 100 WBC 0 <1 /100    Absolute Neutrophils 4.8 1.6 - 8.3 10e3/uL    Absolute Lymphocytes 4.0  0.8 - 5.3 10e3/uL    Absolute Monocytes 1.4 (H) 0.0 - 1.3 10e3/uL    Absolute Eosinophils 0.2 0.0 - 0.7 10e3/uL    Absolute Basophils 0.1 0.0 - 0.2 10e3/uL    Absolute Immature Granulocytes 0.0 <=0.0 10e3/uL    Absolute NRBCs 0.0 10e3/uL                   No results found for this or any previous visit (from the past 24 hour(s)).    Medications   iopamidol (ISOVUE-370) solution 66 mL (66 mLs Intravenous Given 8/6/21 1320)   sodium chloride 0.9 % bag 500mL for CT scan flush use (56 mLs Intravenous Given 8/6/21 1320)       Assessments & Plan (with Medical Decision Making)  Robotic assisted left lower lobe wedge resection, pain control inadequate, evaluated for possible postoperative infection, hematoma versus other given difficulty with pain control.  Lab work-up is unrevealing and reassuring.  CT scan chest abdomen pelvis significant for postoperative changes but no evidence for pneumothorax, fluid collection, abscess or other.  Patient's pain is well controlled here, change her over to hydromorphone 2 mg every 6 hours, ibuprofen acetaminophen okay as well.  Follow-up surgery.  Return criteria reviewed     I have reviewed the nursing notes.    I have reviewed the findings, diagnosis, plan and need for follow up with the patient.       Discharge Medication List as of 8/6/2021  2:49 PM      START taking these medications    Details   HYDROmorphone (DILAUDID) 2 MG tablet Take 1 tablet (2 mg) by mouth every 6 hours as needed for pain, Disp-15 tablet, R-0, Local Print             Final diagnoses:   Postoperative pain       8/6/2021   [facility] EMERGENCY DEPT     Ismael Coronado MD  08/08/21 0652

## 2021-08-06 NOTE — ED NOTES
L lower lobe of lung biopsied last week, pt having L side pain she states is worse at night. Increased pain with deep breaths. VSS. Pt states she had diarrhea today and is taking Murelax.

## 2021-08-10 ENCOUNTER — TELEPHONE (OUTPATIENT)
Dept: SURGERY | Facility: CLINIC | Age: 61
End: 2021-08-10

## 2021-08-10 NOTE — TELEPHONE ENCOUNTER
"Called patient to see how she was doing after being in the ED on Friday 8/6/21. She has a bowel movement every other day. Mostly soft and loose, then it turns into watery stool. She is still having a lot of pain. Will take Ibuprofen when her pain is bad during the day and will take Oxycodone if her pain gets really bad. She is taking Dilaudid at night. Her pain level in the AM is 5-7/10 and in the PM it is an 8-10/10. She is also taking her muscle relaxer Robaxin and patient stated \"sometimes it helps and sometimes it doesn't\". She has been using ice and that helps with her pain. Heat \"does not feel good\". Kitty is also taking Senna and Miralax once per day in the morning. I have sent this information to the APRN.      I will have scheduling contact Kitty to get her Chest X-ray scheduled prior to her appointment with Nikki SCOTT, CNS.  "

## 2021-08-11 ENCOUNTER — OFFICE VISIT (OUTPATIENT)
Dept: FAMILY MEDICINE | Facility: CLINIC | Age: 61
End: 2021-08-11
Payer: COMMERCIAL

## 2021-08-11 VITALS
OXYGEN SATURATION: 97 % | SYSTOLIC BLOOD PRESSURE: 108 MMHG | WEIGHT: 134.6 LBS | DIASTOLIC BLOOD PRESSURE: 66 MMHG | BODY MASS INDEX: 21.63 KG/M2 | RESPIRATION RATE: 16 BRPM | HEART RATE: 62 BPM | TEMPERATURE: 97.6 F | HEIGHT: 66 IN

## 2021-08-11 DIAGNOSIS — G89.18 ACUTE POST-OPERATIVE PAIN: Primary | ICD-10-CM

## 2021-08-11 PROCEDURE — 99213 OFFICE O/P EST LOW 20 MIN: CPT | Performed by: FAMILY MEDICINE

## 2021-08-11 RX ORDER — HYDROMORPHONE HYDROCHLORIDE 2 MG/1
2 TABLET ORAL EVERY 6 HOURS PRN
Qty: 15 TABLET | Refills: 0 | Status: SHIPPED | OUTPATIENT
Start: 2021-08-11 | End: 2021-11-09

## 2021-08-11 RX ORDER — LIDOCAINE 4 G/G
1 PATCH TOPICAL EVERY 24 HOURS
Qty: 30 PATCH | Refills: 1 | Status: SHIPPED | OUTPATIENT
Start: 2021-08-11 | End: 2021-11-09

## 2021-08-11 RX ORDER — GABAPENTIN 100 MG/1
300 CAPSULE ORAL 3 TIMES DAILY
Qty: 270 CAPSULE | Refills: 3 | Status: SHIPPED | OUTPATIENT
Start: 2021-08-11 | End: 2022-03-31

## 2021-08-11 ASSESSMENT — ENCOUNTER SYMPTOMS
CONSTITUTIONAL NEGATIVE: 1
CHILLS: 0
HEMATOLOGIC/LYMPHATIC NEGATIVE: 1
FATIGUE: 0
NEUROLOGICAL NEGATIVE: 1
GASTROINTESTINAL NEGATIVE: 1
MYALGIAS: 1
ALLERGIC/IMMUNOLOGIC NEGATIVE: 1
FEVER: 0
ARTHRALGIAS: 1
COUGH: 0
CARDIOVASCULAR NEGATIVE: 1
PSYCHIATRIC NEGATIVE: 1
EYES NEGATIVE: 1
SHORTNESS OF BREATH: 0
ENDOCRINE NEGATIVE: 1

## 2021-08-11 ASSESSMENT — MIFFLIN-ST. JEOR: SCORE: 1187.29

## 2021-08-11 ASSESSMENT — PAIN SCALES - GENERAL: PAINLEVEL: MODERATE PAIN (4)

## 2021-08-11 NOTE — PROGRESS NOTES
Assessment & Plan     Acute post-operative pain  Recommend taking gabapentin at night. Also added Lidocaine patch.  - gabapentin (NEURONTIN) 100 MG capsule; Take 3 capsules (300 mg) by mouth 3 times daily  - Lidocaine (LIDOCARE) 4 % Patch; Place 1 patch onto the skin every 24 hours To prevent lidocaine toxicity, patient should be patch free for 12 hrs daily.  - HYDROmorphone (DILAUDID) 2 MG tablet; Take 1 tablet (2 mg) by mouth every 6 hours as needed for pain    [id number]}     FUTURE APPOINTMENTS:       - Follow-up visit in 1-2 weeks or sooner as needed.    Return in about 2 weeks (around 8/25/2021) for Follow up.    Solange Mcgill MD  [facility]    Madi Tang is a 60 year old who presents for the following health issues  accompanied by her     HPI Patient is a 60-year-old female presenting  for acute postop pain she had a robotic assisted thoracicoscopic left lower lobe wedge resection on 7/28/2021 for a lung nodule.  She reports that since her surgery she has been having severe pain in the rib cage on the left side.  She has had an emergency room for  this pain and was given some Dilaudid which she says just takes the edge off.  Her pain seems worse at night especially when she is laying down.  She denies any shortness of breath or cough.  She has had no fevers or chills.  Her wounds appear to be healing very well.  She has a follow-up appointment with the truck with thoracic surgeon at the end of the month.  No other acute complaints today.    ED/UC Followup:  Facility: [facility] Emergency Dept  Date of visit: 08/06/20212  Reason for visit:   Patient presents with     Rib Pain       Pain 2 inches below rib cage post op. Left lower Wedge resection on July 28th  Pain mostly at night  Called clinic about pain which clinic called and stated she is coming to our ED     Diarrhea       diarrhea today       Current Status: Still in pain. Gets worse as the day  "progresses. Has not been sleeping well due to the pain. Sometimes the pain meds help but not really. Just taking the edge off but is still waking up in the middle of the night with pain. Has been using ice for pain, not really helping. Dilaudid has not been very effective.  Still having diarrhea about every other day. Currently has diarrhea today.         Review of Systems   Constitutional: Negative.  Negative for chills, fatigue and fever.   HENT: Negative.    Eyes: Negative.    Respiratory: Negative for cough and shortness of breath.    Cardiovascular: Negative.    Gastrointestinal: Negative.    Endocrine: Negative.    Breasts:  negative.    Musculoskeletal: Positive for arthralgias and myalgias.   Skin: Negative.    Allergic/Immunologic: Negative.    Neurological: Negative.    Hematological: Negative.    Psychiatric/Behavioral: Negative.             Objective    /66 (BP Location: Left leg, Patient Position: Sitting, Cuff Size: Adult Regular)   Pulse 62   Temp 97.6  F (36.4  C) (Temporal)   Resp 16   Ht 1.676 m (5' 6\")   Wt 61.1 kg (134 lb 9.6 oz)   SpO2 97%   Breastfeeding No   BMI 21.73 kg/m    Body mass index is 21.73 kg/m .  Physical Exam  Constitutional:       Appearance: Normal appearance.   HENT:      Head: Normocephalic and atraumatic.   Eyes:      Pupils: Pupils are equal, round, and reactive to light.   Cardiovascular:      Rate and Rhythm: Normal rate and regular rhythm.      Pulses: Normal pulses.      Heart sounds: Normal heart sounds.   Pulmonary:      Effort: Pulmonary effort is normal.      Breath sounds: Normal breath sounds.   Chest:      Chest wall: Swelling and tenderness present.          Comments: Pain is localized in the left chest wall. Incisions are healed and dried.  Musculoskeletal:         General: Swelling and tenderness present. Normal range of motion.   Neurological:      Mental Status: She is alert and oriented to person, place, and time.   Psychiatric:         Mood " and Affect: Mood normal.         Behavior: Behavior normal.

## 2021-08-25 ENCOUNTER — HOSPITAL ENCOUNTER (OUTPATIENT)
Dept: GENERAL RADIOLOGY | Facility: CLINIC | Age: 61
Discharge: HOME | End: 2021-08-25
Attending: CLINICAL NURSE SPECIALIST | Admitting: CLINICAL NURSE SPECIALIST
Payer: COMMERCIAL

## 2021-08-25 PROCEDURE — 71046 X-RAY EXAM CHEST 2 VIEWS: CPT

## 2021-08-26 ENCOUNTER — VIRTUAL VISIT (OUTPATIENT)
Dept: SURGERY | Facility: CLINIC | Age: 61
End: 2021-08-26
Attending: THORACIC SURGERY (CARDIOTHORACIC VASCULAR SURGERY)
Payer: COMMERCIAL

## 2021-08-26 DIAGNOSIS — C7A.090 MALIGNANT CARCINOID TUMOR OF LUNG (H): Primary | ICD-10-CM

## 2021-08-26 PROCEDURE — 99024 POSTOP FOLLOW-UP VISIT: CPT | Mod: 95 | Performed by: CLINICAL NURSE SPECIALIST

## 2021-08-26 NOTE — LETTER
"    8/26/2021         RE: Kitty Bangura  [address] [place] [address]        Dear Colleague,    Thank you for referring your patient, Kitty Bangura, to the [facility]. Please see a copy of my visit note below.    Kitty is a 60 year old who is being evaluated via a billable telephone visit.      What phone number would you like to be contacted at? [phone]  How would you like to obtain your AVS? Kyeharnathalia     I have reviewed and updated the patient's allergies and medication list.    Concerns: none  Refills: none     Vitals - Patient Reported  Weight (Patient Reported): 60.8 kg (134 lb)  Height (Patient Reported): 167.6 cm (5' 6\")  BMI (Based on Pt Reported Ht/Wt): 21.63  Pain Score: Moderate Pain (4)  Pain Loc: Other - see comment    Antonieta Gambino CMA    THORACIC SURGERY FOLLOW UP VISIT      I conducted a telephone visit with Mrs. Bangura in follow-up today. 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 She feels like she has turned a corner with regards to her post operative recovery. The first few weeks were more difficult than she anticipated. She does have some neuropathic pain such as sharp shooting pains and some areas of numbness. Her PCP increased her dose of gabapentin which has helped. She occasionally takes dilaudid but does not like the GI upset that it causes her. She is wondering if Dr. Monk can incorporate her carcinoid follow up with the pancreatic cancer.    IMPRESSION   60 year-old woman with left lower lobe carcinoid status post left lower lobe " wedge resection.    I will order a new baseline chest CT to be done prior to her visit with Dr. Monk in early November. Dr. Monk can most certainly incorporate her carcinoid follow up into her pancreatic cancer follow up. She should have annual chest CTs for 5 years as part of her carcinoid follow up.     She understands the nature of neuropathic pain and understands that this process takes time to improve.     PLAN  I spent 15 min on the date of the encounter in chart review, patient visit, review of tests, documentation and/or discussion with other providers about the issues documented above. I reviewed the plan as follows:  New baseline chest CT in early November then follow up with Dr. Monk  1. Necessary Tests & Appointments: Chest CT  2. Pain Control Plan: Gabapentin  3. Anticoagulation Plan: NA  4. Smoking Cessation: NA    All questions were answered and the patient and present family were in agreement with the plan.  I appreciate the opportunity to participate in the care of your patient and will keep you updated.  Sincerely,        Phone call duration: 10 minutes        Again, thank you for allowing me to participate in the care of your patient.        Sincerely,        TYLER Box CNS

## 2021-08-26 NOTE — PROGRESS NOTES
"Kitty is a 60 year old who is being evaluated via a billable telephone visit.      What phone number would you like to be contacted at? [phone]  How would you like to obtain your AVS? Gail     I have reviewed and updated the patient's allergies and medication list.    Concerns: none  Refills: none     Vitals - Patient Reported  Weight (Patient Reported): 60.8 kg (134 lb)  Height (Patient Reported): 167.6 cm (5' 6\")  BMI (Based on Pt Reported Ht/Wt): 21.63  Pain Score: Moderate Pain (4)  Pain Loc: Other - see comment    Antonieta Gambino, [facility]    THORACIC SURGERY FOLLOW UP VISIT      I conducted a telephone visit with Mrs. Bangura in follow-up today. 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 She feels like she has turned a corner with regards to her post operative recovery. The first few weeks were more difficult than she anticipated. She does have some neuropathic pain such as sharp shooting pains and some areas of numbness. Her PCP increased her dose of gabapentin which has helped. She occasionally takes dilaudid but does not like the GI upset that it causes her. She is wondering if Dr. Monk can incorporate her carcinoid follow up with the pancreatic cancer.    IMPRESSION   60 year-old woman with left lower lobe carcinoid status post left lower lobe wedge resection.    I will order a new baseline chest CT to be done prior to her visit with Dr. Monk in early November. Dr. Monk can most certainly incorporate her carcinoid follow up into her pancreatic cancer follow up. She should have annual chest CTs for 5 years " as part of her carcinoid follow up.     She understands the nature of neuropathic pain and understands that this process takes time to improve.     PLAN  I spent 15 min on the date of the encounter in chart review, patient visit, review of tests, documentation and/or discussion with other providers about the issues documented above. I reviewed the plan as follows:  New baseline chest CT in early November then follow up with Dr. Monk  1. Necessary Tests & Appointments: Chest CT  2. Pain Control Plan: Gabapentin  3. Anticoagulation Plan: NA  4. Smoking Cessation: NA    All questions were answered and the patient and present family were in agreement with the plan.  I appreciate the opportunity to participate in the care of your patient and will keep you updated.  Sincerely,        Phone call duration: 10 minutes

## 2021-09-11 ENCOUNTER — HEALTH MAINTENANCE LETTER (OUTPATIENT)
Age: 61
End: 2021-09-11

## 2021-11-07 SDOH — ECONOMIC STABILITY: FOOD INSECURITY: WITHIN THE PAST 12 MONTHS, YOU WORRIED THAT YOUR FOOD WOULD RUN OUT BEFORE YOU GOT MONEY TO BUY MORE.: NEVER TRUE

## 2021-11-07 SDOH — HEALTH STABILITY: PHYSICAL HEALTH: ON AVERAGE, HOW MANY MINUTES DO YOU ENGAGE IN EXERCISE AT THIS LEVEL?: 40 MIN

## 2021-11-07 SDOH — ECONOMIC STABILITY: FOOD INSECURITY: WITHIN THE PAST 12 MONTHS, THE FOOD YOU BOUGHT JUST DIDN'T LAST AND YOU DIDN'T HAVE MONEY TO GET MORE.: NEVER TRUE

## 2021-11-07 SDOH — ECONOMIC STABILITY: TRANSPORTATION INSECURITY
IN THE PAST 12 MONTHS, HAS THE LACK OF TRANSPORTATION KEPT YOU FROM MEDICAL APPOINTMENTS OR FROM GETTING MEDICATIONS?: NO

## 2021-11-07 SDOH — ECONOMIC STABILITY: INCOME INSECURITY: HOW HARD IS IT FOR YOU TO PAY FOR THE VERY BASICS LIKE FOOD, HOUSING, MEDICAL CARE, AND HEATING?: NOT VERY HARD

## 2021-11-07 SDOH — ECONOMIC STABILITY: TRANSPORTATION INSECURITY
IN THE PAST 12 MONTHS, HAS LACK OF TRANSPORTATION KEPT YOU FROM MEETINGS, WORK, OR FROM GETTING THINGS NEEDED FOR DAILY LIVING?: NO

## 2021-11-07 SDOH — HEALTH STABILITY: PHYSICAL HEALTH: ON AVERAGE, HOW MANY DAYS PER WEEK DO YOU ENGAGE IN MODERATE TO STRENUOUS EXERCISE (LIKE A BRISK WALK)?: 7 DAYS

## 2021-11-07 SDOH — ECONOMIC STABILITY: INCOME INSECURITY: IN THE LAST 12 MONTHS, WAS THERE A TIME WHEN YOU WERE NOT ABLE TO PAY THE MORTGAGE OR RENT ON TIME?: NO

## 2021-11-07 ASSESSMENT — ENCOUNTER SYMPTOMS
DIARRHEA: 0
CHILLS: 0
PARESTHESIAS: 0
JOINT SWELLING: 0
SORE THROAT: 0
PALPITATIONS: 0
EYE PAIN: 0
DIZZINESS: 0
COUGH: 0
HEADACHES: 0
CONSTIPATION: 0
MYALGIAS: 1
HEMATURIA: 0
HEARTBURN: 0
NAUSEA: 0
FREQUENCY: 1
DYSURIA: 0
FEVER: 0
WEAKNESS: 0
ARTHRALGIAS: 1
BREAST MASS: 0
NERVOUS/ANXIOUS: 0
HEMATOCHEZIA: 0
SHORTNESS OF BREATH: 1
ABDOMINAL PAIN: 0

## 2021-11-07 ASSESSMENT — LIFESTYLE VARIABLES
HOW OFTEN DO YOU HAVE SIX OR MORE DRINKS ON ONE OCCASION: NEVER
HOW OFTEN DO YOU HAVE A DRINK CONTAINING ALCOHOL: NEVER

## 2021-11-07 ASSESSMENT — ACTIVITIES OF DAILY LIVING (ADL): CURRENT_FUNCTION: NO ASSISTANCE NEEDED

## 2021-11-07 ASSESSMENT — SOCIAL DETERMINANTS OF HEALTH (SDOH)
HOW OFTEN DO YOU GET TOGETHER WITH FRIENDS OR RELATIVES?: ONCE A WEEK
HOW OFTEN DO YOU ATTEND CHURCH OR RELIGIOUS SERVICES?: PATIENT DECLINED
DO YOU BELONG TO ANY CLUBS OR ORGANIZATIONS SUCH AS CHURCH GROUPS UNIONS, FRATERNAL OR ATHLETIC GROUPS, OR SCHOOL GROUPS?: NO
IN A TYPICAL WEEK, HOW MANY TIMES DO YOU TALK ON THE PHONE WITH FAMILY, FRIENDS, OR NEIGHBORS?: ONCE A WEEK

## 2021-11-09 ENCOUNTER — OFFICE VISIT (OUTPATIENT)
Dept: FAMILY MEDICINE | Facility: CLINIC | Age: 61
End: 2021-11-09
Payer: COMMERCIAL

## 2021-11-09 VITALS
HEART RATE: 66 BPM | WEIGHT: 137 LBS | TEMPERATURE: 97 F | SYSTOLIC BLOOD PRESSURE: 94 MMHG | HEIGHT: 66 IN | OXYGEN SATURATION: 98 % | DIASTOLIC BLOOD PRESSURE: 62 MMHG | BODY MASS INDEX: 22.02 KG/M2

## 2021-11-09 DIAGNOSIS — Z23 NEED FOR PROPHYLACTIC VACCINATION AND INOCULATION AGAINST INFLUENZA: ICD-10-CM

## 2021-11-09 DIAGNOSIS — Z13.220 LIPID SCREENING: ICD-10-CM

## 2021-11-09 DIAGNOSIS — Z00.00 ENCOUNTER FOR ROUTINE ADULT HEALTH EXAMINATION WITHOUT ABNORMAL FINDINGS: Primary | ICD-10-CM

## 2021-11-09 DIAGNOSIS — K85.91 NECROTIZING PANCREATITIS: ICD-10-CM

## 2021-11-09 DIAGNOSIS — E11.9 TYPE 2 DIABETES MELLITUS WITHOUT COMPLICATION, WITHOUT LONG-TERM CURRENT USE OF INSULIN (H): ICD-10-CM

## 2021-11-09 DIAGNOSIS — Z23 HIGH PRIORITY FOR 2019-NCOV VACCINE: ICD-10-CM

## 2021-11-09 DIAGNOSIS — Z12.4 SCREENING FOR CERVICAL CANCER: ICD-10-CM

## 2021-11-09 LAB
CHOLEST SERPL-MCNC: 171 MG/DL
FASTING STATUS PATIENT QL REPORTED: YES
HBA1C MFR BLD: 6.1 % (ref 0–5.6)
HDLC SERPL-MCNC: 73 MG/DL
LDLC SERPL CALC-MCNC: 79 MG/DL
NONHDLC SERPL-MCNC: 98 MG/DL
TRIGL SERPL-MCNC: 93 MG/DL

## 2021-11-09 PROCEDURE — 0064A COVID-19,PF,MODERNA (18+ YRS BOOSTER .25ML): CPT | Performed by: FAMILY MEDICINE

## 2021-11-09 PROCEDURE — 83036 HEMOGLOBIN GLYCOSYLATED A1C: CPT | Performed by: FAMILY MEDICINE

## 2021-11-09 PROCEDURE — G0438 PPPS, INITIAL VISIT: HCPCS | Performed by: FAMILY MEDICINE

## 2021-11-09 PROCEDURE — 80061 LIPID PANEL: CPT | Performed by: FAMILY MEDICINE

## 2021-11-09 PROCEDURE — 91306 COVID-19,PF,MODERNA (18+ YRS BOOSTER .25ML): CPT | Performed by: FAMILY MEDICINE

## 2021-11-09 PROCEDURE — 36415 COLL VENOUS BLD VENIPUNCTURE: CPT | Performed by: FAMILY MEDICINE

## 2021-11-09 PROCEDURE — 99214 OFFICE O/P EST MOD 30 MIN: CPT | Mod: 25 | Performed by: FAMILY MEDICINE

## 2021-11-09 PROCEDURE — G0008 ADMIN INFLUENZA VIRUS VAC: HCPCS | Performed by: FAMILY MEDICINE

## 2021-11-09 PROCEDURE — 87624 HPV HI-RISK TYP POOLED RSLT: CPT | Performed by: FAMILY MEDICINE

## 2021-11-09 PROCEDURE — G0145 SCR C/V CYTO,THINLAYER,RESCR: HCPCS | Performed by: FAMILY MEDICINE

## 2021-11-09 PROCEDURE — 90682 RIV4 VACC RECOMBINANT DNA IM: CPT | Performed by: FAMILY MEDICINE

## 2021-11-09 RX ORDER — METFORMIN HCL 500 MG
500 TABLET, EXTENDED RELEASE 24 HR ORAL 2 TIMES DAILY WITH MEALS
Qty: 180 TABLET | Refills: 3 | Status: SHIPPED | OUTPATIENT
Start: 2021-11-09 | End: 2022-11-11

## 2021-11-09 ASSESSMENT — ENCOUNTER SYMPTOMS
SHORTNESS OF BREATH: 1
HEMATOCHEZIA: 0
PALPITATIONS: 0
EYE PAIN: 0
DIARRHEA: 0
FREQUENCY: 1
CHILLS: 0
DYSURIA: 0
HEADACHES: 0
NAUSEA: 0
HEARTBURN: 0
WEAKNESS: 0
ARTHRALGIAS: 1
PARESTHESIAS: 0
NERVOUS/ANXIOUS: 0
CONSTIPATION: 0
JOINT SWELLING: 0
MYALGIAS: 1
DIZZINESS: 0
HEMATURIA: 0
SORE THROAT: 0
COUGH: 0
BREAST MASS: 0
FEVER: 0
ABDOMINAL PAIN: 0

## 2021-11-09 ASSESSMENT — ACTIVITIES OF DAILY LIVING (ADL): CURRENT_FUNCTION: NO ASSISTANCE NEEDED

## 2021-11-09 ASSESSMENT — MIFFLIN-ST. JEOR: SCORE: 1198.18

## 2021-11-09 NOTE — LETTER
November 10, 2021      Kitty Bangura  [address] [address] [facility] [address]        Dear ,    We are writing to inform you of your test results.    Your A1C is within normal limits and improved from the last check. Way to go!    Resulted Orders   Hemoglobin A1c   Result Value Ref Range    Hemoglobin A1C 6.1 (H) 0.0 - 5.6 %      Comment:      Normal <5.7%   Prediabetes 5.7-6.4%    Diabetes 6.5% or higher     Note: Adopted from ADA consensus guidelines.       If you have any questions or concerns, please call the clinic at the number listed above.       Sincerely,      Solange Mcgill MD

## 2021-11-09 NOTE — PATIENT INSTRUCTIONS
Patient Education   Personalized Prevention Plan  You are due for the preventive services outlined below.  Your care team is available to assist you in scheduling these services.  If you have already completed any of these items, please share that information with your care team to update in your medical record.  Health Maintenance Due   Topic Date Due     Diabetic Foot Exam  Never done     ANNUAL REVIEW OF HM ORDERS  Never done     Eye Exam  Never done     HIV Screening  Never done     Hepatitis C Screening  Never done     Meningitis A Vaccine (2 - Risk 2-dose series) 05/28/2018     Kidney Microalbumin Urine Test  08/02/2020     Flu Vaccine (1) 09/01/2021     COVID-19 Vaccine (3 - Booster for Moderna series) 10/17/2021     Cholesterol Lab  10/23/2021     HPV Screening  10/05/2021     PAP Smear  10/05/2021     Annual Wellness Visit  10/23/2021                 Thank you for choosing [facility].  You may be receiving an email and/or telephone survey request from [facility] [employer] regarding your visit today.  Please take a few minutes to respond to the survey to let us know how we are doing.      If you have questions or concerns, please contact us via [employer] or you can contact your care team at [phone] option 2.    Our Clinic hours are:  Monday - Thursday 7am-6pm  Friday 7am-5pm    The [place] outpatient lab hours are:  Monday - Friday 7am-4:30pm    Appointments are required, call [phone]    If you have clinical questions after hours or would like to schedule an appointment,  call the clinic at [phone].

## 2021-11-09 NOTE — PROGRESS NOTES
"SUBJECTIVE:   Kitty Bangura is a 60 year old female who presents for Preventive Visit.      Patient is a 60 yr old female here for her annual physical. She has a past medical history significant for Type II diabetes, Pancreatic adenocarcinoma, recent Lung nodule resection. She appears to be doing well overall. She is requesting refills on her chronic medications. Mammogram was done earlier in the year. Her colonoscopy is not due till 2028.    Patient has been advised of split billing requirements and indicates understanding: Yes   Are you in the first 12 months of your Medicare coverage?  No    Healthy Habits:     In general, how would you rate your overall health?  Good    Frequency of exercise:  6-7 days/week    Duration of exercise:  Greater than 60 minutes    Do you usually eat at least 4 servings of fruit and vegetables a day, include whole grains    & fiber and avoid regularly eating high fat or \"junk\" foods?  Yes    Taking medications regularly:  Yes    Medication side effects:  No muscle aches and No significant flushing    Ability to successfully perform activities of daily living:  No assistance needed    Home Safety:  No safety concerns identified    Hearing Impairment:  No hearing concerns    In the past 6 months, have you been bothered by leaking of urine? Yes    In general, how would you rate your overall mental or emotional health?  Good      PHQ-2 Total Score: 0    Additional concerns today:  Yes  Imm/Inj  Associated symptoms include arthralgias and myalgias. Pertinent negatives include no abdominal pain, chest pain, chills, congestion, coughing, fever, headaches, joint swelling, nausea, rash, sore throat or weakness.     Do you feel safe in your environment? Yes    Have you ever done Advance Care Planning? (For example, a Health Directive, POLST, or a discussion with a medical provider or your loved ones about your wishes): Yes, advance care planning is on file.       Fall risk  Fallen 2 or more " times in the past year?: No  Any fall with injury in the past year?: No    Cognitive Screening   1) Repeat 3 items (Leader, Season, Table)    2) Clock draw: NORMAL  3) 3 item recall: Recalls 3 objects  Results: 3 items recalled: COGNITIVE IMPAIRMENT LESS LIKELY    Mini-CogTM Copyright PAM Ramos. Licensed by the author for use in [facility]; reprinted with permission ([email or web address]). All rights reserved.      Do you have sleep apnea, excessive snoring or daytime drowsiness?: no    Reviewed and updated as needed this visit by clinical staff  Tobacco  Allergies  Meds   Med Hx  Surg Hx  Fam Hx  Soc Hx       Reviewed and updated as needed this visit by Provider               Social History     Tobacco Use     Smoking status: Former Smoker     Packs/day: 0.50     Years: 5.00     Pack years: 2.50     Types: Cigarettes     Start date: 1974     Quit date:      Years since quittin.8     Smokeless tobacco: Never Used   Substance Use Topics     Alcohol use: No     Alcohol/week: 21.0 standard drinks     Comment: Now quit since Oct 31.  Moderate drinker in past     If you drink alcohol do you typically have >3 drinks per day or >7 drinks per week? No    Alcohol Use 2021   Prescreen: >3 drinks/day or >7 drinks/week? -   Prescreen: >3 drinks/day or >7 drinks/week? No           Medication Followup of Creon    Taking Medication as prescribed: yes    Side Effects:  None    Medication Helping Symptoms:  Sometimes she feels like she doesn't digest very well.     Diabetes Follow-up    How often are you checking your blood sugar? Two times daily  Blood sugar testing frequency justification:  Routine checking.  What time of day are you checking your blood sugars (select all that apply)?  Before meals and and in the afternoon.  Have you had any blood sugars above 200?  No  Have you had any blood sugars below 70?  Yes, had this at times.  Average in the am is around 115-120.    What symptoms do you notice  when your blood sugar is low?  Shaky    What concerns do you have today about your diabetes? None     Do you have any of these symptoms? (Select all that apply)  Excessive thirst, Blurry vision and Weight gain, tingling in the feet.    Have you had a diabetic eye exam in the last 12 months? No        BP Readings from Last 2 Encounters:   11/09/21 94/62   08/11/21 108/66     Hemoglobin A1C (%)   Date Value   07/26/2021 6.4 (H)   10/23/2020 6.2 (H)   10/14/2019 6.7 (H)     LDL Cholesterol Calculated (mg/dL)   Date Value   10/23/2020 83   10/14/2019 77           Current providers sharing in care for this patient include:   Patient Care Team:  Solange Mcgill MD as PCP - General (Family Practice)  Solange Mcgill MD as Assigned PCP  Gagan Arrington RN as Clinic Care Coordinator  Vito Sanches MD as MD (Gastroenterology)  Jovany Monk MD as MD (Oncology)  Chela Zavala, RN as Nurse Coordinator (Oncology)  Es Gallo MD as MD (INTERNAL MEDICINE - ENDOCRINOLOGY, DIABETES & METABOLISM)  Jovany Monk MD as Assigned Cancer Care Provider  Enrique Ward MD as Assigned Surgical Provider  Oxana Knox, RN as Registered Nurse (Nurse)  René Phillips MD as Assigned Heart and Vascular Provider    The following health maintenance items are reviewed in Epic and correct as of today:  Health Maintenance Due   Topic Date Due     DIABETIC FOOT EXAM  Never done     ANNUAL REVIEW OF  ORDERS  Never done     EYE EXAM  Never done     HIV SCREENING  Never done     HEPATITIS C SCREENING  Never done     MENINGITIS IMMUNIZATION (2 - Risk 2-dose series) 05/28/2018     MICROALBUMIN  08/02/2020     INFLUENZA VACCINE (1) 09/01/2021     COVID-19 Vaccine (3 - Booster for Moderna series) 10/17/2021     LIPID  10/23/2021     HPV TEST  10/05/2021     PAP  10/05/2021     Lab work is in process  BP Readings from Last 3 Encounters:   11/09/21 94/62   08/11/21 108/66   08/06/21 130/77    Wt Readings  from Last 3 Encounters:   21 62.1 kg (137 lb)   21 61.1 kg (134 lb 9.6 oz)   21 60.8 kg (134 lb)                  Patient Active Problem List   Diagnosis     Leukocytosis     Fatty liver     Pancreatic adenocarcinoma (H)     Anemia     Thrombocytopenia (H)     Type 2 diabetes mellitus without complication, without long-term current use of insulin (H)     Drug-induced polyneuropathy (H)     Alcohol dependence (H)     s/p LLL wedge -      Asplenia after surgical procedure     Oral lichen planus     Leiomyoma of uterus, unspecified     Past Surgical History:   Procedure Laterality Date     ABDOMEN SURGERY      Ruptured tubal pregnancies, additional surgeries followed     BACK SURGERY      L5, S1 discectomy     BREAST SURGERY      Breast reduction     C  DELIVERY ONLY      Description:  Section;  Recorded: 12/10/2009;     COLONOSCOPY       COLONOSCOPY N/A 2018    Procedure: colonoscopy;  Surgeon: Lobito Marte MD;  Location: [facility]     DAVINCI LOBECTOMY LUNG Left 2021    Procedure: Robot-assisted thoracoscopic left lower lobe wedge resection;  Surgeon: René Phillips MD;  Location: [facility]     ENDOSCOPIC RETROGRADE CHOLANGIOPANCREATOGRAM N/A 2018    Procedure: COMBINED ENDOSCOPIC RETROGRADE CHOLANGIOPANCREATOGRAPHY, PLACE TUBE/STENT;  Endoscopic retrograde cholangiopancreatogram with stent placement and cyst drainage bile ;  Surgeon: Vito Sanches MD;  Location: [facility]     ENDOSCOPIC ULTRASOUND LOWER GASTROINTESTIONAL TRACT (GI) N/A 2018    Procedure: ENDOSCOPIC ULTRASOUND LOWER GASTROINTESTIONAL TRACT (GI);  Endoscopic Ultrasound with guided Cyst-Gastrostomy;  Surgeon: González Metz MD;  Location: [facility]     ENDOSCOPIC ULTRASOUND, ESOPHAGOSCOPY, GASTROSCOPY, DUODENOSCOPY (EGD), NECROSECTOMY N/A 2017    Procedure: ENDOSCOPIC ULTRASOUND, ESOPHAGOSCOPY, GASTROSCOPY, DUODENOSCOPY (EGD), NECROSECTOMY;  Esophagogastroduodenoscopy,  with  Necrosectomy and stents replacement  ;  Surgeon: González Metz MD;  [facility]     ENDOSCOPIC ULTRASOUND, ESOPHAGOSCOPY, GASTROSCOPY, DUODENOSCOPY (EGD), NECROSECTOMY N/A 2017    Procedure: ENDOSCOPIC ULTRASOUND, ESOPHAGOSCOPY, GASTROSCOPY, DUODENOSCOPY (EGD), NECROSECTOMY;  Esophagogastroduodenoscopy with Necrosectomy, Balloon Dilation, stent removal X3 and Cystgastrostomy stent Placement X2;  Surgeon: Guru Cecilia Staples MD;  [facility]     ESOPHAGOSCOPY, GASTROSCOPY, DUODENOSCOPY (EGD), COMBINED N/A 2017    Procedure: COMBINED ENDOSCOPIC ULTRASOUND, ESOPHAGOSCOPY, GASTROSCOPY, DUODENOSCOPY (EGD), FINE NEEDLE ASPIRATE/BIOPSY;  Endoscopic Ultrasound with cystgastrostomy and fine needle aspiration ;  Surgeon: González Metz MD;  [facility]     ESOPHAGOSCOPY, GASTROSCOPY, DUODENOSCOPY (EGD), COMBINED N/A 2018    Procedure: COMBINED ESOPHAGOSCOPY, GASTROSCOPY, DUODENOSCOPY (EGD);  EGD;  Surgeon: González Metz MD;  [facility]: [facility]     ESOPHAGOSCOPY, GASTROSCOPY, DUODENOSCOPY (EGD), COMBINED N/A 2018    Procedure: COMBINED ESOPHAGOSCOPY, GASTROSCOPY, DUODENOSCOPY (EGD), REMOVE FOREIGN BODY;;  Surgeon: González Metz MD;  Location: [facility]     GYN SURGERY  1983    Multiple ectopic pregnancies, reconnect,  1988     INSERT PORT VASCULAR ACCESS Right 2018    Procedure: INSERT PORT VASCULAR ACCESS;  Chest Port Placement - right;  Surgeon: Chanda Lawson PA-C;  [facility]     IR PORT REMOVAL RIGHT  2019     LAPAROSCOPY DIAGNOSTIC (GENERAL) N/A 2018    Procedure: LAPAROSCOPY DIAGNOSTIC (GENERAL);  Diagnostic Laparoscopy; Open Distal Pancreatectomy And Splenectomy, splenic flexure mobilization, cholecystectomy, intraoperative ultrasound;  Surgeon: Ja Byrd MD;  Location[facility]     MAMMOPLASTY REDUCTION Bilateral 2006     PANCREATECTOMY, SPLENECTOMY N/A 2018    Procedure: PANCREATECTOMY, SPLENECTOMY;;   Surgeon: Ja Byrd MD;  Location[facility]     ID LAMNOTMY INCL W/DCMPRSN NRV ROOT 1 INTRSPC LUMBR      Description: Hemilaminectomy With Disc Removal One Lumbar Interspace;  Recorded: 2008;  Comments: Right L5-S1 with resultant loss of right patellar and achilles reflex     REMOVE PORT VASCULAR ACCESS Right 2019    Procedure: Right Port Removal;  Surgeon: Juan J Donaldson PA-C;  Location:  OR       Social History     Tobacco Use     Smoking status: Former Smoker     Packs/day: 0.50     Years: 5.00     Pack years: 2.50     Types: Cigarettes     Start date: 1974     Quit date:      Years since quittin.8     Smokeless tobacco: Never Used   Substance Use Topics     Alcohol use: No     Alcohol/week: 21.0 standard drinks     Comment: Now quit since Oct 31.  Moderate drinker in past     Family History   Problem Relation Age of Onset     Heart Disease Father      Hyperlipidemia Father      Heart Disease Brother      Diabetes Mother      Hypertension Mother      Obesity Mother      Depression Mother      Diabetes Maternal Grandfather      Hypertension Maternal Grandfather      Obesity Maternal Grandfather      Diabetes Sister      Hypertension Sister      Depression Sister      Anxiety Disorder Sister      Obesity Sister      Hypertension Brother      Hyperlipidemia Brother      Heart Disease Brother      Breast Cancer Cousin      Breast Cancer Cousin      Breast Cancer Cousin      Colon Cancer Other      Other Cancer Other         Mesothelioma     Depression Sister      Anxiety Disorder Brother      Cancer Brother 64        colon cancer     Anxiety Disorder Son      Depression Son      Mental Illness Sister         Schizophrenia     Hypertension Sister      Obesity Maternal Grandmother      Obesity Sister      Diabetes Nephew      Hypertension Brother          Current Outpatient Medications   Medication Sig Dispense Refill     acetaminophen (TYLENOL) 500 MG tablet Take 2 tablets (1,000 mg)  by mouth every 8 hours 100 tablet 1     amylase-lipase-protease (CREON) 99517-82938 units CPEP per EC capsule TAKE 4 CAPSULES WITH MEALS, 2 CAPSULES WITH SNACKS, UP TO 16 CAPSULES PER DAY 1440 capsule 3     blood glucose (ONETOUCH VERIO IQ) test strip Use to test blood sugar 4 times daily or as directed. 400 each 3     clobetasol (TEMOVATE) 0.05 % external ointment Apply topically 2 times daily 45 g 1     clobetasol (TEMOVATE) 0.05 % GEL topical gel Apply topically 2 times daily 15 g 1     gabapentin (NEURONTIN) 100 MG capsule Take 3 capsules (300 mg) by mouth 3 times daily 270 capsule 3     ibuprofen (ADVIL/MOTRIN) 600 MG tablet Take 1 tablet (600 mg) by mouth every 6 hours as needed       metFORMIN (GLUCOPHAGE-XR) 500 MG 24 hr tablet Take 1 tablet (500 mg) by mouth 2 times daily (with meals) 180 tablet 3     OneTouch Delica Lancets 33G MISC 4 lancets daily 150 each 3     polyethylene glycol (MIRALAX) 17 g packet Take 17 g by mouth daily (Patient not taking: Reported on 11/9/2021) 7 packet 0     No Known Allergies  Recent Labs   Lab Test 08/06/21  1300 07/28/21  1151 07/26/21  1623 06/28/21  1013 06/28/21  0944 03/29/21  1013 03/29/21  0910 12/08/20  0842 10/23/20  1028 10/23/20  1020 12/13/19  0929 10/14/19  0908 07/01/19  0845 04/30/19  1340 12/21/18  0905 10/05/18  0946   A1C  --   --  6.4*  --   --   --   --   --   --  6.2*  --  6.7*  --   --   --   --    LDL  --   --   --   --   --   --   --   --  83  --   --  77  --   --   --  108*   HDL  --   --   --   --   --   --   --   --  87  --   --  78  --   --   --  56   TRIG  --   --   --   --   --   --   --   --  104  --   --  80  --   --   --  107   ALT 30  --   --   --  21  --  20   < > 23  --    < >  --    < >  --    < >  --    CR 0.61 0.70  --   --  0.60  --  0.60   < > 0.64  --    < >  --    < >  --    < >  --    GFRESTIMATED >90 >90  --  85 >90   < > >90   < > >90  --    < >  --    < >  --    < >  --    GFRESTBLACK  --   --   --  >90 >90   < > >90   < > >90   "--    < >  --    < >  --    < >  --    POTASSIUM 4.1 4.4  --   --  4.4  --  4.3   < > 4.0  --    < >  --    < >  --    < >  --    TSH  --   --   --   --   --   --   --   --   --   --   --   --   --  1.48  --   --     < > = values in this interval not displayed.      Mammogram Screening: Mammogram Screening: Recommended mammography every 1-2 years with patient discussion and risk factor consideration  Any new diagnosis of family breast, ovarian, or bowel cancer? No    FHS-7: No flowsheet data found.    Mammogram Screening: Recommended mammography every 1-2 years with patient discussion and risk factor consideration  Pertinent mammograms are reviewed under the imaging tab.    Review of Systems   Constitutional: Negative for chills and fever.   HENT: Negative for congestion, ear pain, hearing loss and sore throat.    Eyes: Negative for pain and visual disturbance.   Respiratory: Positive for shortness of breath. Negative for cough.    Cardiovascular: Negative for chest pain, palpitations and peripheral edema.   Gastrointestinal: Negative for abdominal pain, constipation, diarrhea, heartburn, hematochezia and nausea.   Breasts:  Negative for tenderness, breast mass and discharge.   Genitourinary: Positive for frequency. Negative for dysuria, genital sores, hematuria, pelvic pain, urgency, vaginal bleeding and vaginal discharge.   Musculoskeletal: Positive for arthralgias and myalgias. Negative for joint swelling.   Skin: Negative for rash.   Neurological: Negative for dizziness, weakness, headaches and paresthesias.   Psychiatric/Behavioral: Negative for mood changes. The patient is not nervous/anxious.      Constitutional, HEENT, cardiovascular, pulmonary, gi and gu systems are negative, except as otherwise noted.    OBJECTIVE:   BP 94/62   Pulse 66   Temp 97  F (36.1  C) (Tympanic)   Ht 1.676 m (5' 6\")   Wt 62.1 kg (137 lb)   SpO2 98%   BMI 22.11 kg/m   Estimated body mass index is 22.11 kg/m  as calculated from " "the following:    Height as of this encounter: 1.676 m (5' 6\").    Weight as of this encounter: 62.1 kg (137 lb).  Physical Exam  GENERAL: healthy, alert and no distress  EYES: Eyes grossly normal to inspection, PERRL and conjunctivae and sclerae normal  HENT: ear canals and TM's normal, nose and mouth without ulcers or lesions  NECK: no adenopathy, no asymmetry, masses, or scars and thyroid normal to palpation  RESP: lungs clear to auscultation - no rales, rhonchi or wheezes  CV: regular rate and rhythm, normal S1 S2, no S3 or S4, no murmur, click or rub, no peripheral edema and peripheral pulses strong  ABDOMEN: soft, nontender, no hepatosplenomegaly, no masses and bowel sounds normal   (female): normal female external genitalia, normal urethral meatus, vaginal mucosa, normal cervix/adnexa/uterus without masses or discharge,pap obtained.  MS: no gross musculoskeletal defects noted, no edema  NEURO: Normal strength and tone, mentation intact and speech normal  PSYCH: mentation appears normal and affect normal/bright    Diagnostic Test Results:  Pending     ASSESSMENT / PLAN:       ICD-10-CM    1. Encounter for routine adult health examination without abnormal findings  Z00.00    2. Type 2 diabetes mellitus without complication, without long-term current use of insulin (H)  E11.9 metFORMIN (GLUCOPHAGE-XR) 500 MG 24 hr tablet     Hemoglobin A1c     OFFICE/OUTPT VISIT,EST,LEVL IV   3. Necrotizing pancreatitis  K85.91 amylase-lipase-protease (CREON) 91577-68705 units CPEP per EC capsule     OFFICE/OUTPT VISIT,EST,LEVL IV   4. Lipid screening  Z13.220 Lipid panel reflex to direct LDL Fasting       60 yr old female here for her annual physical. Patient doing okay. Medication refilled.   Labs ordered. Patient will be notified of results.   Patient has been advised of split billing requirements and indicates understanding: Yes  COUNSELING:  Reviewed preventive health counseling, as reflected in patient instructions       " "Regular exercise       Healthy diet/nutrition       Vision screening    Estimated body mass index is 22.11 kg/m  as calculated from the following:    Height as of this encounter: 1.676 m (5' 6\").    Weight as of this encounter: 62.1 kg (137 lb).        She reports that she quit smoking about 40 years ago. Her smoking use included cigarettes. She started smoking about 47 years ago. She has a 2.50 pack-year smoking history. She has never used smokeless tobacco.      Appropriate preventive services were discussed with this patient, including applicable screening as appropriate for cardiovascular disease, diabetes, osteopenia/osteoporosis, and glaucoma.  As appropriate for age/gender, discussed screening for colorectal cancer, prostate cancer, breast cancer, and cervical cancer. Checklist reviewing preventive services available has been given to the patient.    Reviewed patients plan of care and provided an AVS. The Basic Care Plan (routine screening as documented in Health Maintenance) for Kitty meets the Care Plan requirement. This Care Plan has been established and reviewed with the Patient.    Counseling Resources:  ATP IV Guidelines  Pooled Cohorts Equation Calculator  Breast Cancer Risk Calculator  Breast Cancer: Medication to Reduce Risk  FRAX Risk Assessment  ICSI Preventive Guidelines  Dietary Guidelines for Americans, 2010  USDA's MyPlate  ASA Prophylaxis  Lung CA Screening    Solange Mcgill MD  [facility]    Identified Health Risks:  "

## 2021-11-09 NOTE — LETTER
November 12, 2021      Kitty Bangura  [address] [id number]        Dear ,    We are writing to inform you of your test results.    Your test results fall within the expected range(s) or remain unchanged from previous results.  Please continue with current treatment plan.    Resulted Orders   Hemoglobin A1c   Result Value Ref Range    Hemoglobin A1C 6.1 (H) 0.0 - 5.6 %      Comment:      Normal <5.7%   Prediabetes 5.7-6.4%    Diabetes 6.5% or higher     Note: Adopted from ADA consensus guidelines.   Lipid panel reflex to direct LDL Fasting   Result Value Ref Range    Cholesterol 171 <200 mg/dL    Triglycerides 93 <150 mg/dL    Direct Measure HDL 73 >=50 mg/dL    LDL Cholesterol Calculated 79 <=100 mg/dL    Non HDL Cholesterol 98 <130 mg/dL    Patient Fasting > 8hrs? Yes     Narrative    Cholesterol  Desirable:  <200 mg/dL    Triglycerides  Normal:  Less than 150 mg/dL  Borderline High:  150-199 mg/dL  High:  200-499 mg/dL  Very High:  Greater than or equal to 500 mg/dL    Direct Measure HDL  Female:  Greater than or equal to 50 mg/dL   Male:  Greater than or equal to 40 mg/dL    LDL Cholesterol  Desirable:  <100mg/dL  Above Desirable:  100-129 mg/dL   Borderline High:  130-159 mg/dL   High:  160-189 mg/dL   Very High:  >= 190 mg/dL    Non HDL Cholesterol  Desirable:  130 mg/dL  Above Desirable:  130-159 mg/dL  Borderline High:  160-189 mg/dL  High:  190-219 mg/dL  Very High:  Greater than or equal to 220 mg/dL       If you have any questions or concerns, please call the clinic at the number listed above.       Sincerely,      Solange Mcgill MD

## 2021-11-10 DIAGNOSIS — C25.9 MALIGNANT NEOPLASM OF PANCREAS, UNSPECIFIED LOCATION OF MALIGNANCY (H): Primary | ICD-10-CM

## 2021-11-11 LAB
BKR LAB AP GYN ADEQUACY: NORMAL
BKR LAB AP GYN INTERPRETATION: NORMAL
BKR LAB AP HPV REFLEX: NORMAL
BKR LAB AP PREVIOUS ABNORMAL: NORMAL
PATH REPORT.COMMENTS IMP SPEC: NORMAL
PATH REPORT.COMMENTS IMP SPEC: NORMAL
PATH REPORT.RELEVANT HX SPEC: NORMAL

## 2021-11-14 LAB
HUMAN PAPILLOMA VIRUS 16 DNA: NEGATIVE
HUMAN PAPILLOMA VIRUS 18 DNA: NEGATIVE
HUMAN PAPILLOMA VIRUS FINAL DIAGNOSIS: NORMAL
HUMAN PAPILLOMA VIRUS OTHER HR: NEGATIVE

## 2021-11-22 ENCOUNTER — HOSPITAL ENCOUNTER (OUTPATIENT)
Dept: MRI IMAGING | Facility: CLINIC | Age: 61
End: 2021-11-22
Attending: INTERNAL MEDICINE
Payer: COMMERCIAL

## 2021-11-22 ENCOUNTER — HOSPITAL ENCOUNTER (OUTPATIENT)
Dept: CT IMAGING | Facility: CLINIC | Age: 61
End: 2021-11-22
Attending: CLINICAL NURSE SPECIALIST
Payer: COMMERCIAL

## 2021-11-22 DIAGNOSIS — C7A.090 MALIGNANT CARCINOID TUMOR OF LUNG (H): ICD-10-CM

## 2021-11-22 DIAGNOSIS — C25.9 MALIGNANT NEOPLASM OF PANCREAS, UNSPECIFIED LOCATION OF MALIGNANCY (H): ICD-10-CM

## 2021-11-22 PROCEDURE — 258N000003 HC RX IP 258 OP 636: Performed by: INTERNAL MEDICINE

## 2021-11-22 PROCEDURE — 71250 CT THORAX DX C-: CPT

## 2021-11-22 PROCEDURE — A9585 GADOBUTROL INJECTION: HCPCS | Performed by: INTERNAL MEDICINE

## 2021-11-22 PROCEDURE — 74183 MRI ABD W/O CNTR FLWD CNTR: CPT

## 2021-11-22 PROCEDURE — 255N000002 HC RX 255 OP 636: Performed by: INTERNAL MEDICINE

## 2021-11-22 RX ORDER — GADOBUTROL 604.72 MG/ML
6 INJECTION INTRAVENOUS ONCE
Status: COMPLETED | OUTPATIENT
Start: 2021-11-22 | End: 2021-11-22

## 2021-11-22 RX ADMIN — GADOBUTROL 6 ML: 604.72 INJECTION INTRAVENOUS at 13:06

## 2021-11-22 RX ADMIN — SODIUM CHLORIDE 50 ML: 9 INJECTION, SOLUTION INTRAVENOUS at 13:07

## 2021-11-22 NOTE — PROGRESS NOTES
Kitty is a 60 year old who is being evaluated via a billable video visit.      How would you like to obtain your AVS? MyChart  If the video visit is dropped, the invitation should be resent by: Send to e-mail at: [email or web address]  Will anyone else be joining your video visit? Sanna Sandy CMA April 5, 2021  8:25 AM               Oncology Follow-up Visit:  Nov 29, 2021    Cancer diagnosis: cystic pancreatic tail adenocarcinoma  small subcentimeter pulmonary nodules seen on staging scans of unclear etiology.     Treatment to date: neoadjuvant FOLFIRINOX x4 cycles, 1/15/18-3/6/18  Difficulty tolerating neoadjuvant intent chemotherapy. Distal pancreatectomy performed April 17, 2018, no residual carcinoma seen on operative pathology.  Treated with four cycles of adjuvant chemotherapy with gemcitabine and Xeloda, completed September 2018.     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    COVID VACCINATION: April 2021 Moderna; booster Nov 2021.     Referring physician: Dr. González Metz MD    Oncology History: She was previously healthy until she presented in early November 2017 with abdominal pain. CT abdomen on 11/1/17 showed acute pancreatitis (lipase 41,960) and a 3cm heterogenous pancreatic tail mass. The etiology of pancreatitis is unclear - no obstructing gallstone and not a heavy drinker. She was hospitalized for one week and followed up with Dr. González Metz in GI clinic.     11/29/17 -- endoscopic drainage of walled-off area of pancreatic necrosis by Dr. Metz. During the same procedure she had an EUS FNA of the pancreatic tail mass and pathology showed adenocarcinoma. The mass measured 33 x 27 mm, encapsulated with solid and cystic components, rim calcification, and no evidence of invasion.     12/9/17 --  Staging CT chest/abd/pelvis showed several small pulmonary nodules (largest 3mm), unchanged mass in the pancreatic tail, mildly prominent mesenteric nodes thought likely reactive, and small right hepatic lobe hypodensities. Abdominal MRI on 12/10/17 showed hepatic hemangiomas, no evidence of metastatic disease to the liver.     1/8/18 - - oncology consultation, Dr. Monk. Recommendation: neoadjuvant intent chemotherapy with FOLFIRINOX.    1/15/18 - - cycle 1/day one FOLFIRINOX chemotherapy.    1/20 through 1/27/18 - - hospitalization at the [facility], for acute pancreatitis. Following three days of nausea, vomiting, pain. During this hospitalization: ERCP was attempted by Dr. Sanches with pancreatic stent placement, but pancreatic duct was completely obstructed and wire was unable to pass beyond the duct. Dr. Metz performed successful US guided cyst gastrostomy January 25, 2018.    1/31/18 - - oncology follow-up, DANTE Talavera. Neutropenic with ANC 0.4, thus defer cycle two of chemotherapy.    2/5/18 - - oncology follow-up, DANTE Junior.  Cycle 2/day one FOLFIRINOX chemotherapy. Patient endorses cold sensitivity secondary to oxaliplatin. Neulasta given for growth factor support. Due to significant neutropenia with cycle one.    2/11/18 - - ER evaluation for epigastric pain. Discharged to home from ER. Leukocytosis secondary to Neulasta given on February 8.    2/20/18 - - oncology follow-up, DANTE Junior. Cycle 3/day one FOLFIRINOX given without Neulasta. At patient request, oxaliplatin dose reduced by 10% due to neuropathy/cold sensitivity.    3/6/18 - - oncology follow-up, DANTE Talavera. Cycle 4/day one FOLFIRINOX chemotherapy.    3/13/18 - - CT chest/abdomen/pelvis - - IMPRESSION: 1. Minimal decrease in size and marked decrease in enhancement and pancreatic mass since the comparison study. 2. No significant change in low dense lesions in the liver since  ro.    3/16/18 - - oncology follow-up Dr. Monk. Plan is to hold on further chemotherapy as surgery is scheduled for 4/17/18 4/17/18 - - surgery: PROCEDURE: Diagnostic laparoscopy, splenic flexure mobilization, intraoperative ultrasound, distal pancreatectomy, splenectomy, and cholecystectomy. SURGEON: Ja Byrd MD  Final surgical pathology showed necrotizing pancreatitis, no evidence of residual cancer, complete pathologic response, 26 benign lymph nodes.  FINAL DIAGNOSIS: A. DISTAL PANCREAS, SPLEEN AND OMENTUM, RESECTION: - Necrotizing pancreatitis with residual acellular mucin and foci of high grade dysplasia, status post neoadjuvant therapy   - Treatment response: complete (score 0)   - Twenty-six benign lymph nodes (0/26)   - Negative for residual carcinoma   - Pathologic staging: [id number]   - Focal infarction, metaplastic bone formation - Surgical margins are free of neoplasia - Unremarkable spleen and omentum   B. GALLBLADDER, CHOLECYSTECTOMY: - Benign gallbladder, biliary mucosa with no significant histologic abnormality - Negative for dysplasia COMMENT: Histologic sections demonstrate pools of acellular mucin, necrosis and scattered high grade dysplasia (PanIN-3) with no evidence of residual invasive carcinoma    4/30/18 - - surgical oncology follow-up, Dr. Byrd.  5/4/18 - - palliative care follow-up Dr. Snow. No specific recommendations required at this time.  5/14/18 - - oncology follow-up, Dr. Monk. Plan: adjuvant chemotherapy with gemcitabine and Xeloda.  5/30/18 - - cycle 1/day one adjuvant chemotherapy with gemcitabine and Xeloda.  Cycle three was dose reduced due to mucositis secondary to Xeloda.  8/28/18 - - oncology follow-up, DANTE Talavera. Cycle 4/day one gemcitabine and Xeloda.  9/17/18 - - CT chest/abdomen/pelvis - -IMPRESSION:  Resolving postoperative changes. No new findings or evidence of metastatic disease.  9/24/18 - - oncology follow-up, Dr. Monk. PLAN: initiate  active surveillance.  12/21/18--CT c/a/p--IMPRESSION:  1. Stable tiny 2 to 3 mm lung nodules as described above. This  suggests a benign etiology.  2. Two low density liver lesions. These appear to vary in appearance  depending on slight variations in timing of the bolus of contrast.  Likely no significant change. Recommend continued close surveillance.  3. No evidence of recurrent mass in the abdomen or pelvis. No  adenopathy.  12/28/18--scheduled oncology follow-up, Dr Monk. PATIENT NO-SHOW.  12/31/18--oncology follow-up, Dr Monk. Plan: Continue surveillance.  Increase Creon dose due to fatty food intolerance/weight loss.    3/29/19--CT c/a/p-- IMPRESSION: Stable scan compared to 12/21/2018 with the following  findings:  1. Several bilateral small pulmonary nodules, unchanged in appearance.  2. Two right hepatic small hypodense lesions, possibly hemangiomas.  These are also stable in appearance.  3. No apparent recurrent mass.  4/5/19 - - oncology follow-up, Dr. Monk.     7/1/19--CT c/a/p--IMPRESSION:  Stable examination with no convincing metastatic or  recurrent neoplasm.  7/8/19--oncology follow-up, Dr. Monk.    9/30/19 - - CT chest/abdomen/pelvis - - IMPRESSION:  1.  There is a small grouped area of nodularity in the right middle  lobe. Grouped nature favors an inflammatory etiology.  2.  The abdomen CT is stable with no convincing metastatic or  recurrent neoplasm.  10/7/19 - - oncology follow-up, Dr. Monk.    12/13/19 - - CT chest/abdomen/pelvis - - IMPRESSION:  1.  Stable abdomen/pelvis CT with no convincing metastatic or  recurrent neoplasm.  2. New/additional small focus of grouped nodularity in the right  middle lobe. Inflammatory etiology is still favored.  12/20/19 - - oncology follow-up, Dr. Monk.    4/13/20--CT c/a/p--IMPRESSION:  Interval resolution of suspected inflammatory change in right middle lobe. Otherwise, stable examination.       4/20/20--oncology follow-up, Dr. Monk.      8/3/20--CT  c/a/p--IMPRESSION:  Stable examination with no convincing metastatic or  recurrent neoplasm.    August 10, 2020 -- oncology follow-up/virtual visit, Dr. Monk.  12/8/20--CT c/a/p--IMPRESSION:  1.  Stable appearance of distal pancreatectomy and splenectomy.  2.  Stable tiny nodules through both lungs.  3.  New area of likely atelectasis in the posterior left apex  measuring 6 mm. Follow-up would be recommended in six months or to  correspond with the next routine oncologic follow-up.  4.  Two stable small ill-defined lesions in the right hepatic lobe.    December 11, 2020 -- oncology follow-up/virtual visit, Dr. Monk.    3/29/21--CT c/a/p--                                                               IMPRESSION:  1.  Stable appearance of distal pancreatectomy and splenectomy.  2.  The questioned nodular area in the posterior left apex on the  previous exam has nearly completely resolved.  3.  There is a new 8 mm slightly lobulated nodular opacity in the  anterior left lower lobe. Given the appearance over the course of  three months this is likely benign, but follow-up is recommended.  4.  Interval slight increase in size of level 1 and level 2 left  axillary lymph nodes that are now prominent to borderline enlarged.  These could be due to reactive process, but attention is recommended  in future exams.  5. Two low-attenuation areas are again identified in the liver. One of  these appears minimally increased in size, but these are both still  below 1 cm in size. Continued attention to this area would be  recommended, but these are likely benign.    April 5, 2021 -- oncology follow-up/virtual visit, Dr. Monk.    July 28, 2021 --wedge resection surgery for new lung nodule seen on scan:  Final Diagnosis   Lung, left lower lobe, wedge resection:  -Carcinoid tumorlet, 0.3 cm  -Focal neuroendocrine cell hyperplasia  -Granulomatous inflammation with central necrosis          11/22/21--CT chest: IMPRESSION:  Resolving postop  change in the left lung. No new finding.  11/22/21--MRI abdomen --IMPRESSION: In this patient with history of pancreatic neuroendocrine  tumor status post distal pancreatectomy and splenectomy, again noted a  few small hepatic lesions which demonstrate no significant change in  size as compared to prior studies dating back to 12/13/2019 exam.  Although these are too small to characterize, they are likely benign  given their interval stability. No new suspicious focal hepatic  lesion. Recommend continued attention on follow-up.       November 29, 2021 -- oncology follow-up/virtual visit, Dr. Monk.          Interim History:  SUBJECTIVE:  Ms. Bangura joins me from home.  She is generally doing well.  We have been doing active surveillance and she is now 3-1/2 years out from her Whipple surgery for pancreatic head adenocarcinoma.  Notably, she received neoadjuvant chemotherapy and had a pathologic complete response to upfront therapy as you know histopathologic examination following surgery.  We have been doing active surveillance.  There was a suspicious pulmonary nodule that arose.  I referred her to Dr. Phillips from our [facility] team. He evaluated it and ultimately performed a wedge resection on 07/28/2021.  The 3 mm tumor was ultimately diagnostic interestingly, a carcinoid tumorlet that was nonfunctioning.  It was not diagnostic of any recurrent form of pancreatic adenocarcinoma fortunately.  She confirms for me again she did not have any facial flushing or any other carcinoid-like symptoms that would be characteristic of carcinoid syndrome with functioning tumors.        More recently, she had a CT chest, abdomen and MRI abdomen done on 11/22/2021.  I requested MRI specifically due to high resolution as there were previous small lesions in the liver going back at least 2 years as of this month.  There continued to be no evidence of growth and they appeared to be benign  radiologically.  She is feeling well.  She has no concerning issues.  She had a potential COVID exposure last week, about 8 years ago.  She was exposed to friends who were vaccinated and received the same booster that she did as well.  She is not having any symptoms other than some occasional sniffling.  She is requesting a COVID test which I did during the course of this visit, place her an order for a COVID-19 PCR test to be done at [facility] as that facility was apparently requesting a formal order.              Review Of Systems: Comprehensive 14-point ROS reviewed. Pertinent symptoms reviewed above per HPI.    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 Works as . former smoker, quit 30 years ago. two glasses of wine per night, none since pancreatitis diagnosis     Allergies: none      Current Outpatient Medications:      acetaminophen (TYLENOL) 500 MG tablet, Take 2 tablets (1,000 mg) by mouth every 8 hours, Disp: 100 tablet, Rfl: 1     amylase-lipase-protease (CREON) 03062-16417 units CPEP per EC capsule, TAKE 4 CAPSULES WITH MEALS, 2 CAPSULES WITH SNACKS, UP TO 16 CAPSULES PER DAY, Disp: 1440 capsule, Rfl: 3     blood glucose (ONETOUCH VERIO IQ) test strip, Use to test blood sugar 4 times daily or as directed., Disp: 400 each, Rfl: 3     clobetasol (TEMOVATE) 0.05 % external ointment, Apply topically 2 times daily, Disp: 45 g, Rfl: 1     clobetasol (TEMOVATE) 0.05 % GEL topical gel, Apply topically 2 times daily, Disp: 15 g, Rfl: 1     gabapentin (NEURONTIN) 100 MG capsule, Take 3 capsules  (300 mg) by mouth 3 times daily, Disp: 270 capsule, Rfl: 3     ibuprofen (ADVIL/MOTRIN) 600 MG tablet, Take 1 tablet (600 mg) by mouth every 6 hours as needed, Disp: , Rfl:      metFORMIN (GLUCOPHAGE-XR) 500 MG 24 hr tablet, Take 1 tablet (500 mg) by mouth 2 times daily (with meals), Disp: 180 tablet, Rfl: 3     OneTouch Delica Lancets 33G MISC, 4 lancets daily, Disp: 150 each, Rfl: 3     polyethylene glycol (MIRALAX) 17 g packet, Take 17 g by mouth daily (Patient not taking: Reported on 11/9/2021), Disp: 7 packet, Rfl: 0  No current facility-administered medications for this visit.    Facility-Administered Medications Ordered in Other Visits:      heparin 100 UNIT/ML injection 5 mL, 5 mL, Intracatheter, Once, Art Guevara MD      Physical Exam:  Physical exam could not be performed today in context of a Virtual Visit during the COVID19/Coronavirus pandemic.            Observed physical assessments made today by visualizing the patient by video link:    General/Constitutional: Generally appears well, not acutely ill.  HEENT: no scleral icterus, not jaundiced.  Respiratory: no labored breathing.  Musculoskeletal: appears to have full range of motion and adequate physical strength.  Skin: no jaundice, discoloration or other notable lesions.  Neurological: no evidence of tremors.  Psychiatric: no evident anxiety; fully alert and oriented with fluent speech.      The rest of a comprehensive physical examination is deferred due to PHE (public health emergency) video visit restrictions.    Laboratory/Imaging Studies  Prior to and including the day of this visit, I personally reviewed the recent imaging scans. I released the pertinent recent imaging results to [employer] in advance of this visit, and reviewed the summary verbatim and in lay language during today's call.      ASSESSMENT/PLAN:  Mrs. Bangura is a 60 year old woman with resected pancreatic tail adenocarcinoma.  She had initial extensive and severe  necrotizing pancreatitis upon initial diagnosis and hospitalization that delayed treatment.  We ultimately were able to begin neoadjuvant FOLFIRINOX for 4 cycles beginning in January.  By the time she had pancreatectomy by Dr. Byrd in mid April there was no evidence of residual carcinoma seen on operative pathology.  She completed subsequently 4 months of gemcitabine and Xeloda in the adjuvant setting.       ASSESSMENT AND PLAN:  She had a wedge resection of her carcinoid tumor that is 3 mm in diameter.  She will follow up with Dr. Phillips and team to determine when any further followup is indicated, if at all.  From a pancreatic carcinoma standpoint, she is 3-1/2 half years out from surgery, doing well without evidence of recurrence of active disease.  We will get another CT chest, abdomen and MRI abdomen and CA 19-9 checked in approximately 5 months, sooner if indicated by any symptoms and she knows to contact our office in that case.  Otherwise, I look forward to seeing her in about 5 months to review the results.  I have placed a request for COVID-19 PCR testing before at [facility] at her request due to potential recent exposure.  I reaffirmed that she is vaccinated and in fact, in recent months, received an additional booster vaccination.  I congratulated her on moving forward with those vaccinations.            VIRTUAL VISIT - DETAILS:    I have reviewed the note as documented above. This accurately captures the substance of my conversation with the patient.    Date of call: November 29, 2021   Start of call: 11:21 am  End of call: 11:38 am    Provider location: [facility] (academic office)  Patient location: Home      Mode of Video Visit: Lynnette             I spent 17 minutes in consultation, including history and discussion with the patient including review of recent lab values and radiologic imaging results.  An additional 15 minutes was spent on the day of the visit, including  reviewing pertinent medical notes and documentation from other physicians and APPs who have evaluated and treated this patient, pertinent lab values, pathology and imaging results, personal review of radiologic images, discussing the case with referring providers and/or nurse coordinator team, post-visit orders, and all post-visit documentation.    Jovany Monk MD PhD

## 2021-11-29 ENCOUNTER — VIRTUAL VISIT (OUTPATIENT)
Dept: ONCOLOGY | Facility: CLINIC | Age: 61
End: 2021-11-29
Attending: PHYSICIAN ASSISTANT
Payer: COMMERCIAL

## 2021-11-29 DIAGNOSIS — C25.0 MALIGNANT NEOPLASM OF HEAD OF PANCREAS (H): ICD-10-CM

## 2021-11-29 DIAGNOSIS — Z20.822 EXPOSURE TO 2019 NOVEL CORONAVIRUS: Primary | ICD-10-CM

## 2021-11-29 PROCEDURE — 999N001193 HC VIDEO/TELEPHONE VISIT; NO CHARGE

## 2021-11-29 PROCEDURE — 99214 OFFICE O/P EST MOD 30 MIN: CPT | Mod: 95 | Performed by: INTERNAL MEDICINE

## 2021-11-29 NOTE — LETTER
11/29/2021         RE: Kitty Bangura  [address] [facility] [address]        Dear Colleague,    Thank you for referring your patient, Kitty Bangura, to the [facility]. Please see a copy of my visit note below.    Oncology Follow-up Visit:  Nov 29, 2021    Cancer diagnosis: cystic pancreatic tail adenocarcinoma  small subcentimeter pulmonary nodules seen on staging scans of unclear etiology.     Treatment to date: neoadjuvant FOLFIRINOX x4 cycles, 1/15/18-3/6/18  Difficulty tolerating neoadjuvant intent chemotherapy. Distal pancreatectomy performed April 17, 2018, no residual carcinoma seen on operative pathology.  Treated with four cycles of adjuvant chemotherapy with gemcitabine and Xeloda, completed September 2018.     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    COVID VACCINATION: April 2021 Moderna; booster Nov 2021.     Referring physician: Dr. González Metz MD    Oncology History: She was previously healthy until she presented in early November 2017 with abdominal pain. CT abdomen on 11/1/17 showed acute pancreatitis (lipase 41,960) and a 3cm heterogenous pancreatic tail mass. The etiology of pancreatitis is unclear - no obstructing gallstone and not a heavy drinker. She was hospitalized for one week and followed up with Dr. González Metz in GI clinic.     11/29/17 -- endoscopic drainage of walled-off area of pancreatic necrosis by Dr. Metz. During the same procedure she had an EUS FNA of the pancreatic tail mass and pathology showed adenocarcinoma. The mass measured 33 x 27 mm, encapsulated with solid and cystic components, rim calcification, and no evidence of invasion.     12/9/17 -- Staging CT chest/abd/pelvis showed several small pulmonary nodules (largest 3mm), unchanged  mass in the pancreatic tail, mildly prominent mesenteric nodes thought likely reactive, and small right hepatic lobe hypodensities. Abdominal MRI on 12/10/17 showed hepatic hemangiomas, no evidence of metastatic disease to the liver.     1/8/18 - - oncology consultation, Dr. Monk. Recommendation: neoadjuvant intent chemotherapy with FOLFIRINOX.    1/15/18 - - cycle 1/day one FOLFIRINOX chemotherapy.    1/20 through 1/27/18 - - hospitalization at the [facility], for acute pancreatitis. Following three days of nausea, vomiting, pain. During this hospitalization: ERCP was attempted by Dr. Sanches with pancreatic stent placement, but pancreatic duct was completely obstructed and wire was unable to pass beyond the duct. Dr. Metz performed successful US guided cyst gastrostomy January 25, 2018.    1/31/18 - - oncology follow-up, DANTE Talavera. Neutropenic with ANC 0.4, thus defer cycle two of chemotherapy.    2/5/18 - - oncology follow-up, DANTE Junior.  Cycle 2/day one FOLFIRINOX chemotherapy. Patient endorses cold sensitivity secondary to oxaliplatin. Neulasta given for growth factor support. Due to significant neutropenia with cycle one.    2/11/18 - - ER evaluation for epigastric pain. Discharged to home from ER. Leukocytosis secondary to Neulasta given on February 8.    2/20/18 - - oncology follow-up, DANTE Junior. Cycle 3/day one FOLFIRINOX given without Neulasta. At patient request, oxaliplatin dose reduced by 10% due to neuropathy/cold sensitivity.    3/6/18 - - oncology follow-up, DANTE Talavera. Cycle 4/day one FOLFIRINOX chemotherapy.    3/13/18 - - CT chest/abdomen/pelvis - - IMPRESSION: 1. Minimal decrease in size and marked decrease in enhancement and pancreatic mass since the comparison study. 2. No significant change in low dense lesions in the liver since comparison.    3/16/18 - - oncology follow-up Dr. Monk. Plan is to hold on further chemotherapy as  surgery is scheduled for 4/17/18 4/17/18 - - surgery: PROCEDURE: Diagnostic laparoscopy, splenic flexure mobilization, intraoperative ultrasound, distal pancreatectomy, splenectomy, and cholecystectomy. SURGEON: Ja Byrd MD  Final surgical pathology showed necrotizing pancreatitis, no evidence of residual cancer, complete pathologic response, 26 benign lymph nodes.  FINAL DIAGNOSIS: A. DISTAL PANCREAS, SPLEEN AND OMENTUM, RESECTION: - Necrotizing pancreatitis with residual acellular mucin and foci of high grade dysplasia, status post neoadjuvant therapy   - Treatment response: complete (score 0)   - Twenty-six benign lymph nodes (0/26)   - Negative for residual carcinoma   - Pathologic staging: [id number]   - Focal infarction, metaplastic bone formation - Surgical margins are free of neoplasia - Unremarkable spleen and omentum   B. GALLBLADDER, CHOLECYSTECTOMY: - Benign gallbladder, biliary mucosa with no significant histologic abnormality - Negative for dysplasia COMMENT: Histologic sections demonstrate pools of acellular mucin, necrosis and scattered high grade dysplasia (PanIN-3) with no evidence of residual invasive carcinoma    4/30/18 - - surgical oncology follow-up, Dr. Byrd.  5/4/18 - - palliative care follow-up Dr. Snow. No specific recommendations required at this time.  5/14/18 - - oncology follow-up, Dr. Monk. Plan: adjuvant chemotherapy with gemcitabine and Xeloda.  5/30/18 - - cycle 1/day one adjuvant chemotherapy with gemcitabine and Xeloda.  Cycle three was dose reduced due to mucositis secondary to Xeloda.  8/28/18 - - oncology follow-up, DANTE Talavera. Cycle 4/day one gemcitabine and Xeloda.  9/17/18 - - CT chest/abdomen/pelvis - -IMPRESSION:  Resolving postoperative changes. No new findings or evidence of metastatic disease.  9/24/18 - - oncology follow-up, Dr. Monk. PLAN: initiate active surveillance.  12/21/18--CT c/a/p--IMPRESSION:  1. Stable tiny 2 to 3 mm lung nodules as  described above. This  suggests a benign etiology.  2. Two low density liver lesions. These appear to vary in appearance  depending on slight variations in timing of the bolus of contrast.  Likely no significant change. Recommend continued close surveillance.  3. No evidence of recurrent mass in the abdomen or pelvis. No  adenopathy.  12/28/18--scheduled oncology follow-up, Dr Monk. PATIENT NO-SHOW.  12/31/18--oncology follow-up, Dr Monk. Plan: Continue surveillance.  Increase Creon dose due to fatty food intolerance/weight loss.    3/29/19--CT c/a/p-- IMPRESSION: Stable scan compared to 12/21/2018 with the following  findings:  1. Several bilateral small pulmonary nodules, unchanged in appearance.  2. Two right hepatic small hypodense lesions, possibly hemangiomas.  These are also stable in appearance.  3. No apparent recurrent mass.  4/5/19 - - oncology follow-up, Dr. Monk.     7/1/19--CT c/a/p--IMPRESSION:  Stable examination with no convincing metastatic or  recurrent neoplasm.  7/8/19--oncology follow-up, Dr. Monk.    9/30/19 - - CT chest/abdomen/pelvis - - IMPRESSION:  1.  There is a small grouped area of nodularity in the right middle  lobe. Grouped nature favors an inflammatory etiology.  2.  The abdomen CT is stable with no convincing metastatic or  recurrent neoplasm.  10/7/19 - - oncology follow-up, Dr. Monk.    12/13/19 - - CT chest/abdomen/pelvis - - IMPRESSION:  1.  Stable abdomen/pelvis CT with no convincing metastatic or  recurrent neoplasm.  2. New/additional small focus of grouped nodularity in the right  middle lobe. Inflammatory etiology is still favored.  12/20/19 - - oncology follow-up, Dr. Monk.    4/13/20--CT c/a/p--IMPRESSION:  Interval resolution of suspected inflammatory change in right middle lobe. Otherwise, stable examination.       4/20/20--oncology follow-up, Dr. Monk.      8/3/20--CT c/a/p--IMPRESSION:  Stable examination with no convincing metastatic or  recurrent neoplasm.    August 10,  2020 -- oncology follow-up/virtual visit, Dr. Monk.  12/8/20--CT c/a/p--IMPRESSION:  1.  Stable appearance of distal pancreatectomy and splenectomy.  2.  Stable tiny nodules through both lungs.  3.  New area of likely atelectasis in the posterior left apex  measuring 6 mm. Follow-up would be recommended in six months or to  correspond with the next routine oncologic follow-up.  4.  Two stable small ill-defined lesions in the right hepatic lobe.    December 11, 2020 -- oncology follow-up/virtual visit, Dr. Monk.    3/29/21--CT c/a/p--                                                               IMPRESSION:  1.  Stable appearance of distal pancreatectomy and splenectomy.  2.  The questioned nodular area in the posterior left apex on the  previous exam has nearly completely resolved.  3.  There is a new 8 mm slightly lobulated nodular opacity in the  anterior left lower lobe. Given the appearance over the course of  three months this is likely benign, but follow-up is recommended.  4.  Interval slight increase in size of level 1 and level 2 left  axillary lymph nodes that are now prominent to borderline enlarged.  These could be due to reactive process, but attention is recommended  in future exams.  5. Two low-attenuation areas are again identified in the liver. One of  these appears minimally increased in size, but these are both still  below 1 cm in size. Continued attention to this area would be  recommended, but these are likely benign.    April 5, 2021 -- oncology follow-up/virtual visit, Dr. Monk.    July 28, 2021 --wedge resection surgery for new lung nodule seen on scan:  Final Diagnosis   Lung, left lower lobe, wedge resection:  -Carcinoid tumorlet, 0.3 cm  -Focal neuroendocrine cell hyperplasia  -Granulomatous inflammation with central necrosis          11/22/21--CT chest: IMPRESSION:  Resolving postop change in the left lung. No new finding.  11/22/21--MRI abdomen --IMPRESSION: In this patient with history of  pancreatic neuroendocrine  tumor status post distal pancreatectomy and splenectomy, again noted a  few small hepatic lesions which demonstrate no significant change in  size as compared to prior studies dating back to 12/13/2019 exam.  Although these are too small to characterize, they are likely benign  given their interval stability. No new suspicious focal hepatic  lesion. Recommend continued attention on follow-up.       November 29, 2021 -- oncology follow-up/virtual visit, Dr. Monk.          Interim History:  SUBJECTIVE:  Ms. Bangrua joins me from home.  She is generally doing well.  We have been doing active surveillance and she is now 3-1/2 years out from her Whipple surgery for pancreatic head adenocarcinoma.  Notably, she received neoadjuvant chemotherapy and had a pathologic complete response to upfront therapy as you know histopathologic examination following surgery.  We have been doing active surveillance.  There was a suspicious pulmonary nodule that arose.  I referred her to Dr. Phillips from our [facility] team. He evaluated it and ultimately performed a wedge resection on 07/28/2021.  The 3 mm tumor was ultimately diagnostic interestingly, a carcinoid tumorlet that was nonfunctioning.  It was not diagnostic of any recurrent form of pancreatic adenocarcinoma fortunately.  She confirms for me again she did not have any facial flushing or any other carcinoid-like symptoms that would be characteristic of carcinoid syndrome with functioning tumors.        More recently, she had a CT chest, abdomen and MRI abdomen done on 11/22/2021.  I requested MRI specifically due to high resolution as there were previous small lesions in the liver going back at least 2 years as of this month.  There continued to be no evidence of growth and they appeared to be benign radiologically.  She is feeling well.  She has no concerning issues.  She had a potential COVID exposure last week, about 8  years ago.  She was exposed to friends who were vaccinated and received the same booster that she did as well.  She is not having any symptoms other than some occasional sniffling.  She is requesting a COVID test which I did during the course of this visit, place her an order for a COVID-19 PCR test to be done at [facility] as that facility was apparently requesting a formal order.              Review Of Systems: Comprehensive 14-point ROS reviewed. Pertinent symptoms reviewed above per HPI.    PAST MEDICAL HISTORY:   Pre-diabetes  Pancreatitis and pancreas adenoca as above  Lung carcinoid tumorlet--resected 21     PAST SURGICAL HISTORY:  Laparotomy x2 for ruptured tubal pregnancies    Discectomy for herniated lumbar disk  Breast reduction surgery     FAMILY HISTORY:  Colon cancer in maternal aunt  Paternal aunt and cousins with breast cancer  CAD in father and brother     SH: , from [place], with 30+ yr-old son. Works as . former smoker, quit 30 years ago. two glasses of wine per night, none since pancreatitis diagnosis     Allergies: none      Current Outpatient Medications:      acetaminophen (TYLENOL) 500 MG tablet, Take 2 tablets (1,000 mg) by mouth every 8 hours, Disp: 100 tablet, Rfl: 1     amylase-lipase-protease (CREON) 91725-62349 units CPEP per EC capsule, TAKE 4 CAPSULES WITH MEALS, 2 CAPSULES WITH SNACKS, UP TO 16 CAPSULES PER DAY, Disp: 1440 capsule, Rfl: 3     blood glucose (ONETOUCH VERIO IQ) test strip, Use to test blood sugar 4 times daily or as directed., Disp: 400 each, Rfl: 3     clobetasol (TEMOVATE) 0.05 % external ointment, Apply topically 2 times daily, Disp: 45 g, Rfl: 1     clobetasol (TEMOVATE) 0.05 % GEL topical gel, Apply topically 2 times daily, Disp: 15 g, Rfl: 1     gabapentin (NEURONTIN) 100 MG capsule, Take 3 capsules (300 mg) by mouth 3 times daily, Disp: 270 capsule, Rfl: 3     ibuprofen (ADVIL/MOTRIN) 600 MG tablet, Take 1 tablet (600 mg)  by mouth every 6 hours as needed, Disp: , Rfl:      metFORMIN (GLUCOPHAGE-XR) 500 MG 24 hr tablet, Take 1 tablet (500 mg) by mouth 2 times daily (with meals), Disp: 180 tablet, Rfl: 3     OneTouch Delica Lancets 33G MISC, 4 lancets daily, Disp: 150 each, Rfl: 3     polyethylene glycol (MIRALAX) 17 g packet, Take 17 g by mouth daily (Patient not taking: Reported on 11/9/2021), Disp: 7 packet, Rfl: 0  No current facility-administered medications for this visit.    Facility-Administered Medications Ordered in Other Visits:      heparin 100 UNIT/ML injection 5 mL, 5 mL, Intracatheter, Once, Art Guevara MD      Physical Exam:  Physical exam could not be performed today in context of a Virtual Visit during the COVID19/Coronavirus pandemic.            Observed physical assessments made today by visualizing the patient by video link:    General/Constitutional: Generally appears well, not acutely ill.  HEENT: no scleral icterus, not jaundiced.  Respiratory: no labored breathing.  Musculoskeletal: appears to have full range of motion and adequate physical strength.  Skin: no jaundice, discoloration or other notable lesions.  Neurological: no evidence of tremors.  Psychiatric: no evident anxiety; fully alert and oriented with fluent speech.      The rest of a comprehensive physical examination is deferred due to PHE (public health emergency) video visit restrictions.    Laboratory/Imaging Studies  Prior to and including the day of this visit, I personally reviewed the recent imaging scans. I released the pertinent recent imaging results to [email or web address] in advance of this visit, and reviewed the summary verbatim and in lay language during today's call.      ASSESSMENT/PLAN:  Mrs. Bangura is a 60 year old woman with resected pancreatic tail adenocarcinoma.  She had initial extensive and severe necrotizing pancreatitis upon initial diagnosis and hospitalization that delayed treatment.  We ultimately were able to begin  neoadjuvant FOLFIRINOX for 4 cycles beginning in January.  By the time she had pancreatectomy by Dr. Byrd in mid April there was no evidence of residual carcinoma seen on operative pathology.  She completed subsequently 4 months of gemcitabine and Xeloda in the adjuvant setting.       ASSESSMENT AND PLAN:  She had a wedge resection of her carcinoid tumor that is 3 mm in diameter.  She will follow up with Dr. Phillips and team to determine when any further followup is indicated, if at all.  From a pancreatic carcinoma standpoint, she is 3-1/2 half years out from surgery, doing well without evidence of recurrence of active disease.  We will get another CT chest, abdomen and MRI abdomen and CA 19-9 checked in approximately 5 months, sooner if indicated by any symptoms and she knows to contact our office in that case.  Otherwise, I look forward to seeing her in about 5 months to review the results.  I have placed a request for COVID-19 PCR testing before at [facility] at her request due to potential recent exposure.  I reaffirmed that she is vaccinated and in fact, in recent months, received an additional booster vaccination.  I congratulated her on moving forward with those vaccinations.    I spent 17 minutes in consultation, including history and discussion with the patient including review of recent lab values and radiologic imaging results.  An additional 15 minutes was spent on the day of the visit, including reviewing pertinent medical notes and documentation from other physicians and APPs who have evaluated and treated this patient, pertinent lab values, pathology and imaging results, personal review of radiologic images, discussing the case with referring providers and/or nurse coordinator team, post-visit orders, and all post-visit documentation.    Jovany Monk MD PhD

## 2021-12-09 DIAGNOSIS — K85.91 NECROTIZING PANCREATITIS: ICD-10-CM

## 2022-01-12 ENCOUNTER — TRANSFERRED RECORDS (OUTPATIENT)
Dept: HEALTH INFORMATION MANAGEMENT | Facility: CLINIC | Age: 62
End: 2022-01-12
Payer: COMMERCIAL

## 2022-01-12 LAB — RETINOPATHY: NEGATIVE

## 2022-02-15 NOTE — PROGRESS NOTES
Patient coming in 06/28/2021 with no orders, please place future orders. Thanks!   Alternatives Discussed Intro (Do Not Add Period): I discussed alternative treatments to Mohs surgery and specifically discussed the risks and benefits of

## 2022-03-31 ENCOUNTER — MYC REFILL (OUTPATIENT)
Dept: FAMILY MEDICINE | Facility: CLINIC | Age: 62
End: 2022-03-31
Payer: COMMERCIAL

## 2022-03-31 DIAGNOSIS — G89.18 ACUTE POST-OPERATIVE PAIN: ICD-10-CM

## 2022-03-31 NOTE — TELEPHONE ENCOUNTER
Pending Prescriptions:                       Disp   Refills    gabapentin (NEURONTIN) 100 MG capsule      270 ca*3        Sig: Take 3 capsules (300 mg) by mouth 3 times daily    Routing refill request to provider for review/approval because:  Drug not on the [facility] refill protocol     Natalie Conteh RN  [facility]

## 2022-04-02 RX ORDER — GABAPENTIN 100 MG/1
300 CAPSULE ORAL 3 TIMES DAILY
Qty: 270 CAPSULE | Refills: 3 | Status: SHIPPED | OUTPATIENT
Start: 2022-04-02 | End: 2022-07-20

## 2022-04-08 ENCOUNTER — DOCUMENTATION ONLY (OUTPATIENT)
Dept: LAB | Facility: CLINIC | Age: 62
End: 2022-04-08
Payer: COMMERCIAL

## 2022-04-12 ENCOUNTER — LAB (OUTPATIENT)
Dept: LAB | Facility: CLINIC | Age: 62
End: 2022-04-12
Payer: COMMERCIAL

## 2022-04-12 ENCOUNTER — HOSPITAL ENCOUNTER (OUTPATIENT)
Dept: CT IMAGING | Facility: CLINIC | Age: 62
Discharge: HOME | End: 2022-04-12
Attending: INTERNAL MEDICINE
Payer: COMMERCIAL

## 2022-04-12 ENCOUNTER — HOSPITAL ENCOUNTER (OUTPATIENT)
Dept: MRI IMAGING | Facility: CLINIC | Age: 62
Discharge: HOME | End: 2022-04-12
Attending: INTERNAL MEDICINE
Payer: COMMERCIAL

## 2022-04-12 DIAGNOSIS — C25.0 MALIGNANT NEOPLASM OF HEAD OF PANCREAS (H): ICD-10-CM

## 2022-04-12 LAB
CREAT BLD-MCNC: 0.8 MG/DL (ref 0.5–1)
GFR SERPL CREATININE-BSD FRML MDRD: >60 ML/MIN/1.73M2

## 2022-04-12 PROCEDURE — 36415 COLL VENOUS BLD VENIPUNCTURE: CPT

## 2022-04-12 PROCEDURE — A9585 GADOBUTROL INJECTION: HCPCS | Performed by: INTERNAL MEDICINE

## 2022-04-12 PROCEDURE — 74183 MRI ABD W/O CNTR FLWD CNTR: CPT

## 2022-04-12 PROCEDURE — 86301 IMMUNOASSAY TUMOR CA 19-9: CPT | Mod: 90

## 2022-04-12 PROCEDURE — 71260 CT THORAX DX C+: CPT

## 2022-04-12 PROCEDURE — 250N000009 HC RX 250: Performed by: RADIOLOGY

## 2022-04-12 PROCEDURE — 99000 SPECIMEN HANDLING OFFICE-LAB: CPT

## 2022-04-12 PROCEDURE — 250N000011 HC RX IP 250 OP 636: Performed by: RADIOLOGY

## 2022-04-12 PROCEDURE — 82565 ASSAY OF CREATININE: CPT

## 2022-04-12 PROCEDURE — 255N000002 HC RX 255 OP 636: Performed by: INTERNAL MEDICINE

## 2022-04-12 RX ORDER — IOPAMIDOL 755 MG/ML
67 INJECTION, SOLUTION INTRAVASCULAR ONCE
Status: COMPLETED | OUTPATIENT
Start: 2022-04-12 | End: 2022-04-12

## 2022-04-12 RX ORDER — GADOBUTROL 604.72 MG/ML
6 INJECTION INTRAVENOUS ONCE
Status: COMPLETED | OUTPATIENT
Start: 2022-04-12 | End: 2022-04-12

## 2022-04-12 RX ADMIN — GADOBUTROL 6 ML: 604.72 INJECTION INTRAVENOUS at 12:58

## 2022-04-12 RX ADMIN — IOPAMIDOL 67 ML: 755 INJECTION, SOLUTION INTRAVENOUS at 12:47

## 2022-04-12 RX ADMIN — SODIUM CHLORIDE 57 ML: 9 INJECTION, SOLUTION INTRAVENOUS at 12:47

## 2022-04-13 ENCOUNTER — DOCUMENTATION ONLY (OUTPATIENT)
Dept: FAMILY MEDICINE | Facility: CLINIC | Age: 62
End: 2022-04-13
Payer: COMMERCIAL

## 2022-04-13 NOTE — LETTER
[facility]  [address] [address]  [phone]        April 13, 2022    Kitty Bangura                                                                                                                             [address] [facility] [address] [facility] [address]            Dear Kitty,         While doing a recent chart audit, it has come to our attention that you are due for an office visit to follow up on your diabetes management. Please schedule a morning appointment, and come having fasted for the previous 10-12 hours. Regular appointments are a vital part of the care and management for your diabetes and can help prevent many of the complications that can occur as part of this disease. Please call soon to schedule your follow-up appointment.        If you have transferred your care to another office or your diabetes is being managed elsewhere, please respond back to us by calling [phone] so that we do not continue to send you reminder letters.    Sincerely,      Solange Mcgill MD

## 2022-04-14 LAB — CANCER AG19-9 SERPL IA-ACNC: <2 U/ML

## 2022-04-21 NOTE — PROGRESS NOTES
Kitty is a 61 year old who is being evaluated via a billable video visit.      Kitty Bangura stated she is in the state of [place] for the visit today.    How would you like to obtain your AVS? MyChart  If the video visit is dropped, the invitation should be resent by: Send to e-mail at: [email or web address]  Will anyone else be joining your video visit? No        Video-Visit Details    Type of service:  Video Visit      Distant Location (provider location):  [facility]     Platform used for Video Visit: Daniel Sanches, Virtual Visit Facilitator        Oncology Follow-up Visit:  Apr 22, 2022    Cancer diagnosis: cystic pancreatic tail adenocarcinoma  small subcentimeter pulmonary nodules seen on staging scans of unclear etiology.     Treatment to date: neoadjuvant FOLFIRINOX x4 cycles, 1/15/18-3/6/18  Difficulty tolerating neoadjuvant intent chemotherapy. Distal pancreatectomy performed April 17, 2018, no residual carcinoma seen on operative pathology.  Treated with four cycles of adjuvant chemotherapy with gemcitabine and Xeloda, completed September 2018.     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    COVID VACCINATION: April 2021 Moderna; booster Nov 2021.     Referring physician: Dr. González Metz MD    Oncology History: She was previously healthy until she presented in early November 2017 with abdominal pain. CT abdomen on 11/1/17 showed acute pancreatitis (lipase 41,960) and a 3cm heterogenous pancreatic tail mass. The etiology of pancreatitis is unclear - no obstructing gallstone and not a heavy drinker. She was hospitalized for one week and followed up with Dr. González Metz in GI clinic.     11/29/17 -- endoscopic drainage of walled-off area of pancreatic necrosis by Dr. Metz. During the  same procedure she had an EUS FNA of the pancreatic tail mass and pathology showed adenocarcinoma. The mass measured 33 x 27 mm, encapsulated with solid and cystic components, rim calcification, and no evidence of invasion.     12/9/17 -- Staging CT chest/abd/pelvis showed several small pulmonary nodules (largest 3mm), unchanged mass in the pancreatic tail, mildly prominent mesenteric nodes thought likely reactive, and small right hepatic lobe hypodensities. Abdominal MRI on 12/10/17 showed hepatic hemangiomas, no evidence of metastatic disease to the liver.     1/8/18 - - oncology consultation, Dr. Monk. Recommendation: neoadjuvant intent chemotherapy with FOLFIRINOX.    1/15/18 - - cycle 1/day one FOLFIRINOX chemotherapy.    1/20 through 1/27/18 - - hospitalization at the [facility], for acute pancreatitis. Following three days of nausea, vomiting, pain. During this hospitalization: ERCP was attempted by Dr. Sanches with pancreatic stent placement, but pancreatic duct was completely obstructed and wire was unable to pass beyond the duct. Dr. Metz performed successful US guided cyst gastrostomy January 25, 2018.    1/31/18 - - oncology follow-up, DANTE Talavera. Neutropenic with ANC 0.4, thus defer cycle two of chemotherapy.    2/5/18 - - oncology follow-up, DANTE Junior.  Cycle 2/day one FOLFIRINOX chemotherapy. Patient endorses cold sensitivity secondary to oxaliplatin. Neulasta given for growth factor support. Due to significant neutropenia with cycle one.    2/11/18 - - ER evaluation for epigastric pain. Discharged to home from ER. Leukocytosis secondary to Neulasta given on February 8.    2/20/18 - - oncology follow-up, DANTE Junior. Cycle 3/day one FOLFIRINOX given without Neulasta. At patient request, oxaliplatin dose reduced by 10% due to neuropathy/cold sensitivity.    3/6/18 - - oncology follow-up, DANTE Talavera. Cycle 4/day one FOLFIRINOX  chemotherapy.    3/13/18 - - CT chest/abdomen/pelvis - - IMPRESSION: 1. Minimal decrease in size and marked decrease in enhancement and pancreatic mass since the comparison study. 2. No significant change in low dense lesions in the liver since comparison.    3/16/18 - - oncology follow-up Dr. Monk. Plan is to hold on further chemotherapy as surgery is scheduled for 4/17/18 4/17/18 - - surgery: PROCEDURE: Diagnostic laparoscopy, splenic flexure mobilization, intraoperative ultrasound, distal pancreatectomy, splenectomy, and cholecystectomy. SURGEON: Ja Byrd MD  Final surgical pathology showed necrotizing pancreatitis, no evidence of residual cancer, complete pathologic response, 26 benign lymph nodes.  FINAL DIAGNOSIS: A. DISTAL PANCREAS, SPLEEN AND OMENTUM, RESECTION: - Necrotizing pancreatitis with residual acellular mucin and foci of high grade dysplasia, status post neoadjuvant therapy   - Treatment response: complete (score 0)   - Twenty-six benign lymph nodes (0/26)   - Negative for residual carcinoma   - Pathologic staging: [id number]   - Focal infarction, metaplastic bone formation - Surgical margins are free of neoplasia - Unremarkable spleen and omentum   B. GALLBLADDER, CHOLECYSTECTOMY: - Benign gallbladder, biliary mucosa with no significant histologic abnormality - Negative for dysplasia COMMENT: Histologic sections demonstrate pools of acellular mucin, necrosis and scattered high grade dysplasia (PanIN-3) with no evidence of residual invasive carcinoma    4/30/18 - - surgical oncology follow-up, Dr. Byrd.  5/4/18 - - palliative care follow-up Dr. Snow. No specific recommendations required at this time.  5/14/18 - - oncology follow-up, Dr. Monk. Plan: adjuvant chemotherapy with gemcitabine and Xeloda.  5/30/18 - - cycle 1/day one adjuvant chemotherapy with gemcitabine and Xeloda.  Cycle three was dose reduced due to mucositis secondary to Xeloda.  8/28/18 - - oncology follow-up, Kellie  DANTE Nobles. Cycle 4/day one gemcitabine and Xeloda.  9/17/18 - - CT chest/abdomen/pelvis - -IMPRESSION:  Resolving postoperative changes. No new findings or evidence of metastatic disease.  9/24/18 - - oncology follow-up, Dr. Monk. PLAN: initiate active surveillance.  12/21/18--CT c/a/p--IMPRESSION:  1. Stable tiny 2 to 3 mm lung nodules as described above. This  suggests a benign etiology.  2. Two low density liver lesions. These appear to vary in appearance  depending on slight variations in timing of the bolus of contrast.  Likely no significant change. Recommend continued close surveillance.  3. No evidence of recurrent mass in the abdomen or pelvis. No  adenopathy.  12/28/18--scheduled oncology follow-up, Dr Monk. PATIENT NO-SHOW.  12/31/18--oncology follow-up, Dr Monk. Plan: Continue surveillance.  Increase Creon dose due to fatty food intolerance/weight loss.    3/29/19--CT c/a/p-- IMPRESSION: Stable scan compared to 12/21/2018 with the following  findings:  1. Several bilateral small pulmonary nodules, unchanged in appearance.  2. Two right hepatic small hypodense lesions, possibly hemangiomas.  These are also stable in appearance.  3. No apparent recurrent mass.  4/5/19 - - oncology follow-up, Dr. Monk.     7/1/19--CT c/a/p--IMPRESSION:  Stable examination with no convincing metastatic or  recurrent neoplasm.  7/8/19--oncology follow-up, Dr. Monk.    9/30/19 - - CT chest/abdomen/pelvis - - IMPRESSION:  1.  There is a small grouped area of nodularity in the right middle  lobe. Grouped nature favors an inflammatory etiology.  2.  The abdomen CT is stable with no convincing metastatic or  recurrent neoplasm.  10/7/19 - - oncology follow-up, Dr. Monk.    12/13/19 - - CT chest/abdomen/pelvis - - IMPRESSION:  1.  Stable abdomen/pelvis CT with no convincing metastatic or  recurrent neoplasm.  2. New/additional small focus of grouped nodularity in the right  middle lobe. Inflammatory etiology is still  juan r  12/20/19 - - oncology follow-up, Dr. Monk.    4/13/20--CT c/a/p--IMPRESSION:  Interval resolution of suspected inflammatory change in right middle lobe. Otherwise, stable examination.       4/20/20--oncology follow-up, Dr. Monk.      8/3/20--CT c/a/p--IMPRESSION:  Stable examination with no convincing metastatic or  recurrent neoplasm.    August 10, 2020 -- oncology follow-up/virtual visit, Dr. Monk.  12/8/20--CT c/a/p--IMPRESSION:  1.  Stable appearance of distal pancreatectomy and splenectomy.  2.  Stable tiny nodules through both lungs.  3.  New area of likely atelectasis in the posterior left apex  measuring 6 mm. Follow-up would be recommended in six months or to  correspond with the next routine oncologic follow-up.  4.  Two stable small ill-defined lesions in the right hepatic lobe.    December 11, 2020 -- oncology follow-up/virtual visit, Dr. Monk.    3/29/21--CT c/a/p--                                                               IMPRESSION:  1.  Stable appearance of distal pancreatectomy and splenectomy.  2.  The questioned nodular area in the posterior left apex on the  previous exam has nearly completely resolved.  3.  There is a new 8 mm slightly lobulated nodular opacity in the  anterior left lower lobe. Given the appearance over the course of  three months this is likely benign, but follow-up is recommended.  4.  Interval slight increase in size of level 1 and level 2 left  axillary lymph nodes that are now prominent to borderline enlarged.  These could be due to reactive process, but attention is recommended  in future exams.  5. Two low-attenuation areas are again identified in the liver. One of  these appears minimally increased in size, but these are both still  below 1 cm in size. Continued attention to this area would be  recommended, but these are likely benign.    April 5, 2021 -- oncology follow-up/virtual visit, Dr. Monk.    July 28, 2021 --wedge resection surgery for new lung nodule  seen on scan:  Final Diagnosis   Lung, left lower lobe, wedge resection:  -Carcinoid tumorlet, 0.3 cm  -Focal neuroendocrine cell hyperplasia  -Granulomatous inflammation with central necrosis          11/22/21--CT chest: IMPRESSION:  Resolving postop change in the left lung. No new finding.  11/22/21--MRI abdomen --IMPRESSION: In this patient with history of pancreatic neuroendocrine  tumor status post distal pancreatectomy and splenectomy, again noted a  few small hepatic lesions which demonstrate no significant change in  size as compared to prior studies dating back to 12/13/2019 exam.  Although these are too small to characterize, they are likely benign  given their interval stability. No new suspicious focal hepatic  lesion. Recommend continued attention on follow-up.       November 29, 2021 -- oncology follow-up/virtual visit, Dr. Monk.    4/12/22--CT chest - IMPRESSION:   1.  No interval change to most recent comparison dated 11/22/2021.  2.  Status post left lower lobe wedge resection.  3.  A few unchanged punctate pulmonary nodules, likely benign.  4.  No new or enlarging pulmonary nodules.    4/12/22-MRI abdomen--IMPRESSION: In this patient with history of pancreatic neuroendocrine [sic]  tumor status post distal pancreatectomy and splenectomy, again noted a  few small hepatic lesions which demonstrate no significant change in  size as compared to prior studies dating back to 12/13/2019 exam.  Although these are too small to characterize, they are likely benign  given their interval stability. No new suspicious focal hepatic  Lesion.    April 22, 2022 -- oncology follow-up/virtual visit, Dr. Monk.        Interim History:  Ms. Bangura joins me from her home for a virtual video visit.  She is doing relatively well.  She does not have any significant abdominal issues or symptoms.  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  She most recently had it refilled, she  believes, by her primary care provider.  She does not have any diarrhea or abdominal cramping or any other gastrointestinal complaints at this time.      This month marks 4 years since her surgery for pancreas cancer.  She had a followup surveillance CT chest and also an MRI abdomen on  as well as a  drawn.  The  was unremarkable, and the scans did not show any evidence of recurrent or progressive malignancy.  Of note, I pointed out to her there was a typo on the Radiology report that stated that she had a pancreatic neuroendocrine tumor. That was in error, as her tumor was a pancreatic adenocarcinoma, and she is aware of this.  There were some small lesions seen in the liver, but they have been there for 3 years and are strongly suspected to be nonmalignant at all.  She was reassured to hear this.      She is up to date on her COVID booster.  We briefly discussed the possibility of a second COVID booster being available, as she is above the age of 48.         Latest Reference Range & Units 21 09:10 21 09:44 22 11:57   Cancer Antigen 19-9 <=35 U/mL 2 [1] 3 [2] <2 [3]     Review Of Systems: Comprehensive 14-point ROS reviewed. Pertinent symptoms reviewed above per HPI.    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 Works as . former smoker, quit 30 years ago. two glasses of wine per night, none since pancreatitis diagnosis     Allergies: none      Current Outpatient Medications:      acetaminophen (TYLENOL) 500 MG tablet, Take 2 tablets (1,000 mg) by mouth every 8 hours,  "Disp: 100 tablet, Rfl: 1     amylase-lipase-protease (CREON) 03205-10375 units CPEP per EC capsule, TAKE 4 CAPSULES WITH MEALS, 2 CAPSULES WITH SNACKS, UP TO 16 CAPSULES PER DAY, Disp: 1440 capsule, Rfl: 3     blood glucose (ONETOUCH VERIO IQ) test strip, Use to test blood sugar 4 times daily or as directed., Disp: 400 each, Rfl: 3     clobetasol (TEMOVATE) 0.05 % external ointment, Apply topically 2 times daily, Disp: 45 g, Rfl: 1     clobetasol (TEMOVATE) 0.05 % GEL topical gel, Apply topically 2 times daily, Disp: 15 g, Rfl: 1     gabapentin (NEURONTIN) 100 MG capsule, Take 3 capsules (300 mg) by mouth 3 times daily, Disp: 270 capsule, Rfl: 3     ibuprofen (ADVIL/MOTRIN) 600 MG tablet, Take 1 tablet (600 mg) by mouth every 6 hours as needed, Disp: , Rfl:      metFORMIN (GLUCOPHAGE-XR) 500 MG 24 hr tablet, Take 1 tablet (500 mg) by mouth 2 times daily (with meals), Disp: 180 tablet, Rfl: 3     OneTouch Delica Lancets 33G MISC, 4 lancets daily, Disp: 150 each, Rfl: 3     polyethylene glycol (MIRALAX) 17 g packet, Take 17 g by mouth daily (Patient not taking: Reported on 11/9/2021), Disp: 7 packet, Rfl: 0  No current facility-administered medications for this visit.    Facility-Administered Medications Ordered in Other Visits:      heparin 100 UNIT/ML injection 5 mL, 5 mL, Intracatheter, Once, Art Guevara MD      Physical Exam:  Physical exam could not be performed today in context of a Virtual Visit during the COVID19/Coronavirus pandemic.  Vitals - Patient Reported  Weight (Patient Reported): 60.8 kg (134 lb)  Height (Patient Reported): 165.1 cm (5' 5\")  BMI (Based on Pt Reported Ht/Wt): 22.3  Pain Score: No Pain (0)         Observed physical assessments made today by visualizing the patient by video link:    General/Constitutional: Generally appears well, not acutely ill.  HEENT: no scleral icterus, not jaundiced.  Respiratory: no labored breathing.  Musculoskeletal: appears to have full range of " motion and adequate physical strength.  Skin: no jaundice, discoloration or other notable lesions.  Neurological: no evidence of tremors.  Psychiatric: no evident anxiety; fully alert and oriented with fluent speech.      The rest of a comprehensive physical examination is deferred due to PHE (public health emergency) video visit restrictions.    Laboratory/Imaging Studies  Prior to and including the day of this visit, I personally reviewed the recent imaging scans. I released the pertinent recent imaging results to [employer] in advance of this visit, and reviewed the summary verbatim and in lay language during today's call.      ASSESSMENT/PLAN:  Mrs. Bangura is a 61 year old woman with resected pancreatic tail adenocarcinoma.  She had initial extensive and severe necrotizing pancreatitis upon initial diagnosis and hospitalization that delayed treatment.  We ultimately were able to begin neoadjuvant FOLFIRINOX for 4 cycles beginning in January.  By the time she had pancreatectomy by Dr. Byrd in mid April there was no evidence of residual carcinoma seen on operative pathology.  She completed subsequently 4 months of gemcitabine and Xeloda in the adjuvant setting.        She had a wedge resection of her lung-based carcinoid tumor that is 3 mm in diameter.  She will follow up with Dr. Phillips and team to determine when any further followup is indicated, if at all.      She is doing well and is a survivor and thriver 4 years out from her extensive surgery for pancreatic cancer in 04/2018.  We will continue active surveillance per NCCN and ASCO guidelines.      We will obtain a CT chest, abdomen and pelvis and a  in 6 months' time, and she will follow up with me within a few days to review the results.  She updated me that she is moving to [place] and may have her scan done at a different hospital within the [facility] system, and our scheduling team will work with her on that.  She is doing well.  She  will continue to get Creon either through her primary care provider or through us, and we will proceed accordingly.        VIRTUAL VISIT - DETAILS:    I have reviewed the note as documented above. This accurately captures the substance of my conversation with the patient.    Date of call: April 22, 2022   Start of call: 8:02 am  End of call: 8:11 am    Provider location: [facility] (academic office)  Patient location: Home      Mode of Video Visit: Lynnette             I spent 9 minutes in consultation, including history and discussion with the patient including review of recent lab values and radiologic imaging results.  An additional 18 minutes was spent on the day of the visit, including reviewing pertinent medical notes and documentation from other physicians and APPs who have evaluated and treated this patient, pertinent lab values, pathology and imaging results, personal review of radiologic images, discussing the case with referring providers and/or nurse coordinator team, post-visit orders, and all post-visit documentation.    Jovany Monk MD PhD

## 2022-04-22 ENCOUNTER — VIRTUAL VISIT (OUTPATIENT)
Dept: ONCOLOGY | Facility: CLINIC | Age: 62
End: 2022-04-22
Attending: INTERNAL MEDICINE
Payer: COMMERCIAL

## 2022-04-22 DIAGNOSIS — C25.8 OVERLAPPING MALIGNANT NEOPLASM OF PANCREAS (H): Primary | ICD-10-CM

## 2022-04-22 PROCEDURE — 99214 OFFICE O/P EST MOD 30 MIN: CPT | Mod: 95 | Performed by: INTERNAL MEDICINE

## 2022-04-22 PROCEDURE — G0463 HOSPITAL OUTPT CLINIC VISIT: HCPCS | Mod: PN,RTG | Performed by: INTERNAL MEDICINE

## 2022-04-22 NOTE — NURSING NOTE
Kitty Bangura verified meds and allergies are correct via patients echeck in. No changes at this time.    Ewa Sanches, Virtual Facilitator

## 2022-04-22 NOTE — LETTER
4/22/2022         RE: Kitty Bangura  [address]  [facility] [address]        Dear Colleague,    Thank you for referring your patient, Kitty Bangura, to the [facility]. Please see a copy of my visit note below.    Kitty is a 61 year old who is being evaluated via a billable video visit.      Kitty Bangura stated she is in the state of [place] for the visit today.    How would you like to obtain your AVS? MyChart  If the video visit is dropped, the invitation should be resent by: Send to e-mail at: [email or web address]  Will anyone else be joining your video visit? No        Video-Visit Details    Type of service:  Video Visit      Distant Location (provider location):  [facility]     Platform used for Video Visit: Daniel Sanches, Virtual Visit Facilitator        Oncology Follow-up Visit:  Apr 22, 2022    Cancer diagnosis: cystic pancreatic tail adenocarcinoma  small subcentimeter pulmonary nodules seen on staging scans of unclear etiology.     Treatment to date: neoadjuvant FOLFIRINOX x4 cycles, 1/15/18-3/6/18  Difficulty tolerating neoadjuvant intent chemotherapy. Distal pancreatectomy performed April 17, 2018, no residual carcinoma seen on operative pathology.  Treated with four cycles of adjuvant chemotherapy with gemcitabine and Xeloda, completed September 2018.     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    COVID VACCINATION: April 2021 Moderna; booster Nov 2021.     Referring physician: Dr. González Metz MD    Oncology History: She was previously healthy until she presented in early November 2017 with abdominal pain. CT abdomen on 11/1/17 showed acute pancreatitis (lipase 41,960) and a 3cm heterogenous pancreatic tail mass. The etiology of  pancreatitis is unclear - no obstructing gallstone and not a heavy drinker. She was hospitalized for one week and followed up with Dr. González Metz in GI clinic.     11/29/17 -- endoscopic drainage of walled-off area of pancreatic necrosis by Dr. Metz. During the same procedure she had an EUS FNA of the pancreatic tail mass and pathology showed adenocarcinoma. The mass measured 33 x 27 mm, encapsulated with solid and cystic components, rim calcification, and no evidence of invasion.     12/9/17 -- Staging CT chest/abd/pelvis showed several small pulmonary nodules (largest 3mm), unchanged mass in the pancreatic tail, mildly prominent mesenteric nodes thought likely reactive, and small right hepatic lobe hypodensities. Abdominal MRI on 12/10/17 showed hepatic hemangiomas, no evidence of metastatic disease to the liver.     1/8/18 - - oncology consultation, Dr. Monk. Recommendation: neoadjuvant intent chemotherapy with FOLFIRINOX.    1/15/18 - - cycle 1/day one FOLFIRINOX chemotherapy.    1/20 through 1/27/18 - - hospitalization at the [facility], for acute pancreatitis. Following three days of nausea, vomiting, pain. During this hospitalization: ERCP was attempted by Dr. Sanches with pancreatic stent placement, but pancreatic duct was completely obstructed and wire was unable to pass beyond the duct. Dr. Metz performed successful US guided cyst gastrostomy January 25, 2018.    1/31/18 - - oncology follow-up, DANTE Talavera. Neutropenic with ANC 0.4, thus defer cycle two of chemotherapy.    2/5/18 - - oncology follow-up, DANTE Junior.  Cycle 2/day one FOLFIRINOX chemotherapy. Patient endorses cold sensitivity secondary to oxaliplatin. Neulasta given for growth factor support. Due to significant neutropenia with cycle one.    2/11/18 - - ER evaluation for epigastric pain. Discharged to home from ER. Leukocytosis secondary to Neulasta given on February 8.    2/20/18 - - oncology  follow-up, DANTE Junior. Cycle 3/day one FOLFIRINOX given without Neulasta. At patient request, oxaliplatin dose reduced by 10% due to neuropathy/cold sensitivity.    3/6/18 - - oncology follow-up, DANTE Talavera. Cycle 4/day one FOLFIRINOX chemotherapy.    3/13/18 - - CT chest/abdomen/pelvis - - IMPRESSION: 1. Minimal decrease in size and marked decrease in enhancement and pancreatic mass since the comparison study. 2. No significant change in low dense lesions in the liver since comparison.    3/16/18 - - oncology follow-up Dr. Monk. Plan is to hold on further chemotherapy as surgery is scheduled for 4/17/18 4/17/18 - - surgery: PROCEDURE: Diagnostic laparoscopy, splenic flexure mobilization, intraoperative ultrasound, distal pancreatectomy, splenectomy, and cholecystectomy. SURGEON: Ja Byrd MD  Final surgical pathology showed necrotizing pancreatitis, no evidence of residual cancer, complete pathologic response, 26 benign lymph nodes.  FINAL DIAGNOSIS: A. DISTAL PANCREAS, SPLEEN AND OMENTUM, RESECTION: - Necrotizing pancreatitis with residual acellular mucin and foci of high grade dysplasia, status post neoadjuvant therapy   - Treatment response: complete (score 0)   - Twenty-six benign lymph nodes (0/26)   - Negative for residual carcinoma   - Pathologic staging: [id number]   - Focal infarction, metaplastic bone formation - Surgical margins are free of neoplasia - Unremarkable spleen and omentum   B. GALLBLADDER, CHOLECYSTECTOMY: - Benign gallbladder, biliary mucosa with no significant histologic abnormality - Negative for dysplasia COMMENT: Histologic sections demonstrate pools of acellular mucin, necrosis and scattered high grade dysplasia (PanIN-3) with no evidence of residual invasive carcinoma    4/30/18 - - surgical oncology follow-up, Dr. Byrd.  5/4/18 - - palliative care follow-up Dr. Snow. No specific recommendations required at this time.  5/14/18 - - oncology follow-up,   Aleshia. Plan: adjuvant chemotherapy with gemcitabine and Xeloda.  5/30/18 - - cycle 1/day one adjuvant chemotherapy with gemcitabine and Xeloda.  Cycle three was dose reduced due to mucositis secondary to Xeloda.  8/28/18 - - oncology follow-up, DANTE Talavera. Cycle 4/day one gemcitabine and Xeloda.  9/17/18 - - CT chest/abdomen/pelvis - -IMPRESSION:  Resolving postoperative changes. No new findings or evidence of metastatic disease.  9/24/18 - - oncology follow-up, Dr. Monk. PLAN: initiate active surveillance.  12/21/18--CT c/a/p--IMPRESSION:  1. Stable tiny 2 to 3 mm lung nodules as described above. This  suggests a benign etiology.  2. Two low density liver lesions. These appear to vary in appearance  depending on slight variations in timing of the bolus of contrast.  Likely no significant change. Recommend continued close surveillance.  3. No evidence of recurrent mass in the abdomen or pelvis. No  adenopathy.  12/28/18--scheduled oncology follow-up, Dr Monk. PATIENT NO-SHOW.  12/31/18--oncology follow-up, Dr Monk. Plan: Continue surveillance.  Increase Creon dose due to fatty food intolerance/weight loss.    3/29/19--CT c/a/p-- IMPRESSION: Stable scan compared to 12/21/2018 with the following  findings:  1. Several bilateral small pulmonary nodules, unchanged in appearance.  2. Two right hepatic small hypodense lesions, possibly hemangiomas.  These are also stable in appearance.  3. No apparent recurrent mass.  4/5/19 - - oncology follow-up, Dr. Monk.     7/1/19--CT c/a/p--IMPRESSION:  Stable examination with no convincing metastatic or  recurrent neoplasm.  7/8/19--oncology follow-up, Dr. Monk.    9/30/19 - - CT chest/abdomen/pelvis - - IMPRESSION:  1.  There is a small grouped area of nodularity in the right middle  lobe. Grouped nature favors an inflammatory etiology.  2.  The abdomen CT is stable with no convincing metastatic or  recurrent neoplasm.  10/7/19 - - oncology follow-up, Dr. Monk.    12/13/19 - -  CT chest/abdomen/pelvis - - IMPRESSION:  1.  Stable abdomen/pelvis CT with no convincing metastatic or  recurrent neoplasm.  2. New/additional small focus of grouped nodularity in the right  middle lobe. Inflammatory etiology is still favored.  12/20/19 - - oncology follow-up, Dr. Monk.    4/13/20--CT c/a/p--IMPRESSION:  Interval resolution of suspected inflammatory change in right middle lobe. Otherwise, stable examination.       4/20/20--oncology follow-up, Dr. Monk.      8/3/20--CT c/a/p--IMPRESSION:  Stable examination with no convincing metastatic or  recurrent neoplasm.    August 10, 2020 -- oncology follow-up/virtual visit, Dr. Monk.  12/8/20--CT c/a/p--IMPRESSION:  1.  Stable appearance of distal pancreatectomy and splenectomy.  2.  Stable tiny nodules through both lungs.  3.  New area of likely atelectasis in the posterior left apex  measuring 6 mm. Follow-up would be recommended in six months or to  correspond with the next routine oncologic follow-up.  4.  Two stable small ill-defined lesions in the right hepatic lobe.    December 11, 2020 -- oncology follow-up/virtual visit, Dr. Monk.    3/29/21--CT c/a/p--                                                               IMPRESSION:  1.  Stable appearance of distal pancreatectomy and splenectomy.  2.  The questioned nodular area in the posterior left apex on the  previous exam has nearly completely resolved.  3.  There is a new 8 mm slightly lobulated nodular opacity in the  anterior left lower lobe. Given the appearance over the course of  three months this is likely benign, but follow-up is recommended.  4.  Interval slight increase in size of level 1 and level 2 left  axillary lymph nodes that are now prominent to borderline enlarged.  These could be due to reactive process, but attention is recommended  in future exams.  5. Two low-attenuation areas are again identified in the liver. One of  these appears minimally increased in size, but these are both  still  below 1 cm in size. Continued attention to this area would be  recommended, but these are likely benign.    April 5, 2021 -- oncology follow-up/virtual visit, Dr. Monk.    July 28, 2021 --wedge resection surgery for new lung nodule seen on scan:  Final Diagnosis   Lung, left lower lobe, wedge resection:  -Carcinoid tumorlet, 0.3 cm  -Focal neuroendocrine cell hyperplasia  -Granulomatous inflammation with central necrosis          11/22/21--CT chest: IMPRESSION:  Resolving postop change in the left lung. No new finding.  11/22/21--MRI abdomen --IMPRESSION: In this patient with history of pancreatic neuroendocrine  tumor status post distal pancreatectomy and splenectomy, again noted a  few small hepatic lesions which demonstrate no significant change in  size as compared to prior studies dating back to 12/13/2019 exam.  Although these are too small to characterize, they are likely benign  given their interval stability. No new suspicious focal hepatic  lesion. Recommend continued attention on follow-up.       November 29, 2021 -- oncology follow-up/virtual visit, Dr. Monk.    4/12/22--CT chest - IMPRESSION:   1.  No interval change to most recent comparison dated 11/22/2021.  2.  Status post left lower lobe wedge resection.  3.  A few unchanged punctate pulmonary nodules, likely benign.  4.  No new or enlarging pulmonary nodules.    4/12/22-MRI abdomen--IMPRESSION: In this patient with history of pancreatic neuroendocrine [sic]  tumor status post distal pancreatectomy and splenectomy, again noted a  few small hepatic lesions which demonstrate no significant change in  size as compared to prior studies dating back to 12/13/2019 exam.  Although these are too small to characterize, they are likely benign  given their interval stability. No new suspicious focal hepatic  Lesion.    April 22, 2022 -- oncology follow-up/virtual visit, Dr. Monk.        Interim History:  Ms. Bangura joins me from her home for a virtual  video visit.  She is doing relatively well.  She does not have any significant abdominal issues or symptoms.  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  She most recently had it refilled, she believes, by her primary care provider.  She does not have any diarrhea or abdominal cramping or any other gastrointestinal complaints at this time.      This month marks 4 years since her surgery for pancreas cancer.  She had a followup surveillance CT chest and also an MRI abdomen on  as well as a  drawn.  The  was unremarkable, and the scans did not show any evidence of recurrent or progressive malignancy.  Of note, I pointed out to her there was a typo on the Radiology report that stated that she had a pancreatic neuroendocrine tumor. That was in error, as her tumor was a pancreatic adenocarcinoma, and she is aware of this.  There were some small lesions seen in the liver, but they have been there for 3 years and are strongly suspected to be nonmalignant at all.  She was reassured to hear this.      She is up to date on her COVID booster.  We briefly discussed the possibility of a second COVID booster being available, as she is above the age of 48.         Latest Reference Range & Units 21 09:10 21 09:44 22 11:57   Cancer Antigen 19-9 <=35 U/mL 2 [1] 3 [2] <2 [3]     Review Of Systems: Comprehensive 14-point ROS reviewed. Pertinent symptoms reviewed above per HPI.    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 Works as . former  "smoker, quit 30 years ago. two glasses of wine per night, none since pancreatitis diagnosis     Allergies: none      Current Outpatient Medications:      acetaminophen (TYLENOL) 500 MG tablet, Take 2 tablets (1,000 mg) by mouth every 8 hours, Disp: 100 tablet, Rfl: 1     amylase-lipase-protease (CREON) 76253-57719 units CPEP per EC capsule, TAKE 4 CAPSULES WITH MEALS, 2 CAPSULES WITH SNACKS, UP TO 16 CAPSULES PER DAY, Disp: 1440 capsule, Rfl: 3     blood glucose (ONETOUCH VERIO IQ) test strip, Use to test blood sugar 4 times daily or as directed., Disp: 400 each, Rfl: 3     clobetasol (TEMOVATE) 0.05 % external ointment, Apply topically 2 times daily, Disp: 45 g, Rfl: 1     clobetasol (TEMOVATE) 0.05 % GEL topical gel, Apply topically 2 times daily, Disp: 15 g, Rfl: 1     gabapentin (NEURONTIN) 100 MG capsule, Take 3 capsules (300 mg) by mouth 3 times daily, Disp: 270 capsule, Rfl: 3     ibuprofen (ADVIL/MOTRIN) 600 MG tablet, Take 1 tablet (600 mg) by mouth every 6 hours as needed, Disp: , Rfl:      metFORMIN (GLUCOPHAGE-XR) 500 MG 24 hr tablet, Take 1 tablet (500 mg) by mouth 2 times daily (with meals), Disp: 180 tablet, Rfl: 3     OneTouch Delica Lancets 33G MISC, 4 lancets daily, Disp: 150 each, Rfl: 3     polyethylene glycol (MIRALAX) 17 g packet, Take 17 g by mouth daily (Patient not taking: Reported on 11/9/2021), Disp: 7 packet, Rfl: 0  No current facility-administered medications for this visit.    Facility-Administered Medications Ordered in Other Visits:      heparin 100 UNIT/ML injection 5 mL, 5 mL, Intracatheter, Once, Art Guevara MD      Physical Exam:  Physical exam could not be performed today in context of a Virtual Visit during the COVID19/Coronavirus pandemic.  Vitals - Patient Reported  Weight (Patient Reported): 60.8 kg (134 lb)  Height (Patient Reported): 165.1 cm (5' 5\")  BMI (Based on Pt Reported Ht/Wt): 22.3  Pain Score: No Pain (0)         Observed physical assessments made " today by visualizing the patient by video link:    General/Constitutional: Generally appears well, not acutely ill.  HEENT: no scleral icterus, not jaundiced.  Respiratory: no labored breathing.  Musculoskeletal: appears to have full range of motion and adequate physical strength.  Skin: no jaundice, discoloration or other notable lesions.  Neurological: no evidence of tremors.  Psychiatric: no evident anxiety; fully alert and oriented with fluent speech.      The rest of a comprehensive physical examination is deferred due to PHE (public health emergency) video visit restrictions.    Laboratory/Imaging Studies  Prior to and including the day of this visit, I personally reviewed the recent imaging scans. I released the pertinent recent imaging results to Diurnal in advance of this visit, and reviewed the summary verbatim and in lay language during today's call.      ASSESSMENT/PLAN:  Mrs. Bangura is a 61 year old woman with resected pancreatic tail adenocarcinoma.  She had initial extensive and severe necrotizing pancreatitis upon initial diagnosis and hospitalization that delayed treatment.  We ultimately were able to begin neoadjuvant FOLFIRINOX for 4 cycles beginning in January.  By the time she had pancreatectomy by Dr. Byrd in mid April there was no evidence of residual carcinoma seen on operative pathology.  She completed subsequently 4 months of gemcitabine and Xeloda in the adjuvant setting.        She had a wedge resection of her lung-based carcinoid tumor that is 3 mm in diameter.  She will follow up with Dr. Phillips and team to determine when any further followup is indicated, if at all.      She is doing well and is a survivor and thriver 4 years out from her extensive surgery for pancreatic cancer in 04/2018.  We will continue active surveillance per NCCN and ASCO guidelines.      We will obtain a CT chest, abdomen and pelvis and a  in 6 months' time, and she will follow up with me within a few  days to review the results.  She updated me that she is moving to [place] and may have her scan done at a different hospital within the home healthcare system, and our scheduling team will work with her on that.  She is doing well.  She will continue to get Creon either through her primary care provider or through us, and we will proceed accordingly.        VIRTUAL VISIT - DETAILS:    I have reviewed the note as documented above. This accurately captures the substance of my conversation with the patient.    Date of call: April 22, 2022   Start of call: 8:02 am  End of call: 8:11 am    Provider location: [facility] (academic office)  Patient location: Home      Mode of Video Visit: Lynnette             I spent 9 minutes in consultation, including history and discussion with the patient including review of recent lab values and radiologic imaging results.  An additional 18 minutes was spent on the day of the visit, including reviewing pertinent medical notes and documentation from other physicians and APPs who have evaluated and treated this patient, pertinent lab values, pathology and imaging results, personal review of radiologic images, discussing the case with referring providers and/or nurse coordinator team, post-visit orders, and all post-visit documentation.        Again, thank you for allowing me to participate in the care of your patient.      Sincerely,    Jovany Monk MD

## 2022-05-27 ENCOUNTER — VIRTUAL VISIT (OUTPATIENT)
Dept: FAMILY MEDICINE | Facility: CLINIC | Age: 62
End: 2022-05-27
Payer: COMMERCIAL

## 2022-05-27 DIAGNOSIS — D69.6 THROMBOCYTOPENIA (H): ICD-10-CM

## 2022-05-27 DIAGNOSIS — U07.1 CLINICAL DIAGNOSIS OF COVID-19: Primary | ICD-10-CM

## 2022-05-27 DIAGNOSIS — C25.9 PANCREATIC ADENOCARCINOMA (H): ICD-10-CM

## 2022-05-27 DIAGNOSIS — E11.9 TYPE 2 DIABETES MELLITUS WITHOUT COMPLICATION, WITHOUT LONG-TERM CURRENT USE OF INSULIN (H): ICD-10-CM

## 2022-05-27 PROCEDURE — 99214 OFFICE O/P EST MOD 30 MIN: CPT | Mod: 95 | Performed by: NURSE PRACTITIONER

## 2022-05-27 RX ORDER — BENZONATATE 100 MG/1
100 CAPSULE ORAL 3 TIMES DAILY PRN
Qty: 30 CAPSULE | Refills: 0 | Status: SHIPPED | OUTPATIENT
Start: 2022-05-27 | End: 2022-11-11

## 2022-05-27 NOTE — PROGRESS NOTES
Kitty is a 61 year old who is being evaluated via a billable video visit.      How would you like to obtain your AVS? MyChart  If the video visit is dropped, the invitation should be resent by: Text to cell phone: [phone]   Will anyone else be joining your video visit? No  Video Start Time: 9:24 AM    Assessment & Plan     Clinical diagnosis of COVID-19  Thrombocytopenia (H)  Type 2 diabetes mellitus without complication, without long-term current use of insulin (H)  Pancreatic adenocarcinoma (H)  Patient confirmed her medication list and that she is not on any anticancer medications at this time. Patient with risk factors for severe illness including thrombocytopenia, DM II, Ca, and age; patient has normal kidney and liver function with weight over 40 kg so qualifies for antivirals. Counseled on risks/benefits of medication and patient would like to proceed. Counseled on self-care measures including: OTC Mucinex and/or acetaminophen PRN, rest, self quarantine, masking, and red flags of when to seek urgent medical attention including difficulty breathing, blue or yellow skin color changes, abdominal swelling, lightheadedness/confusion.  - nirmatrelvir and ritonavir (PAXLOVID) therapy pack; Take 3 tablets by mouth 2 times daily for 5 days Take 2 Nirmatrelvir tablets and 1 Ritonavir tablet twice daily for 5 days.  - benzonatate (TESSALON) 100 MG capsule; Take 1 capsule (100 mg) by mouth 3 times daily as needed for cough    Review of the result(s) of each unique test - CMP. Cr  Prescription drug management    See Patient Instructions    Return in about 2 weeks (around 6/10/2022) for follow up if symptoms worsen or do not improve.    TYLER Cueva [facility]    Subjective   Kitty is a 61 year old who presents for the following health issues     HPI     Acute Illness  Acute illness concerns:    Onset/Duration: Symptoms started yesterday. Patient took an at home covid test  today and was positive. No PCR test was done.   Symptoms:  Fever: 100.5 temp this morning.  Chills/Sweats: YES.both.  Headache (location?): YES  Sinus Pressure: YES  Conjunctivitis:  no  Ear Pain: no  Rhinorrhea: YES  Congestion: YES  Sore Throat: no  Cough: YES  Wheeze: no  Decreased Appetite: no  Nausea: no  Vomiting: no  Diarrhea: no  Dysuria/Freq.: no  Dysuria or Hematuria: no  Fatigue/Achiness: YES.both.  Sick/Strep Exposure: no  Therapies tried and outcome: Tylenol, somewhat helpful     Review of Systems   Constitutional, HEENT, cardiovascular, pulmonary, GI, , musculoskeletal, neuro, skin, endocrine and psych systems are negative, except as otherwise noted.      Objective           Vitals:  No vitals were obtained today due to virtual visit.    Physical Exam   GENERAL: alert, no distress and fatigued  EYES: Eyes grossly normal to inspection.  No discharge or erythema, or obvious scleral/conjunctival abnormalities.  RESP: No audible wheeze, cough, or visible cyanosis.  No visible retractions or increased work of breathing. Cough noted.   SKIN: Visible skin clear. No significant rash, abnormal pigmentation or lesions.  NEURO: Cranial nerves grossly intact.  Mentation and speech appropriate for age.  PSYCH: Mentation appears normal, affect normal/bright, judgement and insight intact, normal speech and appearance well-groomed.          Video-Visit Details    Type of service:  Video Visit    Video End Time:9:38 AM    Originating Location (pt. Location): Home    Distant Location (provider location):  [facility]     Platform used for Video Visit: [employer]

## 2022-05-27 NOTE — PATIENT INSTRUCTIONS
Instructions for Patients      What are the symptoms of COVID-19?  Symptoms can include fever, cough, shortness of breath, chills, headache, muscle pain sore throat, fatigue, runny or stuffy nose, and loss of taste and smell. Other less common symptoms include nausea, vomiting, or diarrhea (watery stools).    Know when to call 911. Emergency warning signs include:    Trouble breathing or shortness of breath    Pain or pressure in the chest that doesn't go away    Feeling confused like you haven't felt before, or not being able to wake up    Bluish-colored lips or face    How can I take care of myself?  1. Get lots of rest. Drink extra fluids (unless a doctor has told you not to).  2. Take Tylenol (acetaminophen) for fever or pain. If you have liver or kidney problems, ask your family doctor if it's okay to take Tylenol   Adults:   650 mg (two 325 mg pills or tablets) every 4 to 6 hours, or...   1,000 mg (two 500 mg pills or tablets) every 8 hours as needed.  Note: Don't take more than 3,000 mg in one day. Acetaminophen is found in many medicines (both prescribed and over the counter). Read all labels to be sure you don't take too much.  For children, check the Tylenol bottle for the right dose. The dose is based on the child's age or weight.  3. Take over the counter medicines for your symptoms as needed. Talk to your pharmacist.  4. If you have other health problems (like cancer, heart failure, an organ transplant, or severe kidney disease): Call your specialty clinic if you don't feel better in the next 2 days.    These guidelines are for isolating and quarantining before returning to work, school or .     For employers, schools and day cares: This is an official notice for this person s medical guidelines for returning in-person.     For health care sites: The CDC gives different isolation and quarantine guidelines for healthcare sites, please check with these sites before arriving.     How do I  self-isolate?  You isolate when you have symptoms of COVID or a test shows you have COVID, even if you don t have symptoms.     If you DO have symptoms:  o Stay home and away from others  - For at least 5 days after your symptoms started, AND   - You are fever free for 24 hours (with no medicine that reduces fever), AND  - Your other symptoms are better.  o Wear a mask for 10 full days any time you are around others.    If you DON T have symptoms:  o Stay at home and away from others for at least 5 days after your positive test.  o Wear a mask for 10 full days any time you are around others.    How and when do I quarantine?  You quarantine when you may have been exposed to the virus and DON T have symptoms.     Stay home and away from others.     You must quarantine for 5 days after your last contact with a person who has COVID.  o Note: If you are fully vaccinated, you don t need to quarantine. You should still follow the steps below.     Wear a mask for 10 full days any time you re around others.    Get tested at least 5 days after you were exposed, even if you don t have symptoms.     If you start to have symptoms, isolate right away and get tested.    Where can I get more information?    [facility] COVID-19 Resource Hub: [email or web address]     CDC Quarantine & Isolation: https://www.cdc.gov/coronavirus/2019-ncov/your-health/quarantine-isolation.html     CDC - What to Do If You're Sick: https://www.cdc.gov/coronavirus/2019-ncov/if-you-are-sick/index.html    [facility] clinical trials (COVID-19 research studies): [email or web address]    [place] Department of Health COVID-19 Public Hotline: 1-255.104.9075

## 2022-06-01 ENCOUNTER — MYC MEDICAL ADVICE (OUTPATIENT)
Dept: FAMILY MEDICINE | Facility: CLINIC | Age: 62
End: 2022-06-01
Payer: COMMERCIAL

## 2022-06-01 NOTE — TELEPHONE ENCOUNTER
[employer] message sent to patient.     Thanks,  BRENDAN Beal  [facility]

## 2022-06-01 NOTE — TELEPHONE ENCOUNTER
Writer is unsure about testing positive after Paxlovid as well as doing a second round of the medication. Will route to Tram Mancuso to advise.      Kayla LAGUNAS RN, BSN  [facility]

## 2022-06-12 ENCOUNTER — HEALTH MAINTENANCE LETTER (OUTPATIENT)
Age: 62
End: 2022-06-12

## 2022-07-15 ENCOUNTER — MYC MEDICAL ADVICE (OUTPATIENT)
Dept: FAMILY MEDICINE | Facility: CLINIC | Age: 62
End: 2022-07-15

## 2022-07-15 DIAGNOSIS — G89.18 ACUTE POST-OPERATIVE PAIN: ICD-10-CM

## 2022-07-20 RX ORDER — GABAPENTIN 100 MG/1
300 CAPSULE ORAL 3 TIMES DAILY
Qty: 270 CAPSULE | Refills: 3 | Status: SHIPPED | OUTPATIENT
Start: 2022-07-20 | End: 2023-01-25

## 2022-10-04 PROBLEM — R91.1 SOLITARY PULMONARY NODULE: Status: ACTIVE | Noted: 2021-07-21

## 2022-10-11 ENCOUNTER — LAB (OUTPATIENT)
Dept: LAB | Facility: CLINIC | Age: 62
End: 2022-10-11
Attending: INTERNAL MEDICINE
Payer: COMMERCIAL

## 2022-10-11 ENCOUNTER — HOSPITAL ENCOUNTER (OUTPATIENT)
Dept: CT IMAGING | Facility: CLINIC | Age: 62
Discharge: HOME | End: 2022-10-11
Attending: INTERNAL MEDICINE
Payer: COMMERCIAL

## 2022-10-11 DIAGNOSIS — C25.8 OVERLAPPING MALIGNANT NEOPLASM OF PANCREAS (H): ICD-10-CM

## 2022-10-11 LAB
CREAT BLD-MCNC: 0.7 MG/DL (ref 0.6–1.1)
GFR SERPL CREATININE-BSD FRML MDRD: >60 ML/MIN/1.73M2

## 2022-10-11 PROCEDURE — 250N000011 HC RX IP 250 OP 636: Performed by: INTERNAL MEDICINE

## 2022-10-11 PROCEDURE — 74177 CT ABD & PELVIS W/CONTRAST: CPT

## 2022-10-11 PROCEDURE — 82565 ASSAY OF CREATININE: CPT

## 2022-10-11 PROCEDURE — 36415 COLL VENOUS BLD VENIPUNCTURE: CPT

## 2022-10-11 PROCEDURE — 86301 IMMUNOASSAY TUMOR CA 19-9: CPT

## 2022-10-11 RX ORDER — IOPAMIDOL 755 MG/ML
100 INJECTION, SOLUTION INTRAVASCULAR ONCE
Status: COMPLETED | OUTPATIENT
Start: 2022-10-11 | End: 2022-10-11

## 2022-10-11 RX ADMIN — IOPAMIDOL 100 ML: 755 INJECTION, SOLUTION INTRAVENOUS at 13:36

## 2022-10-12 LAB — CANCER AG19-9 SERPL IA-ACNC: <2 U/ML

## 2022-10-14 NOTE — PROGRESS NOTES
Kitty is a 61 year old who is being evaluated via a billable video visit.      How would you like to obtain your AVS? MyChart  If the video visit is dropped, the invitation should be resent by: Send to e-mail at: [email or web address]  Will anyone else be joining your video visit? No        Video-Visit Details        Type of service:  Video Visit              Distant Location (provider location):  On-site    Platform used for Video Visit: Daniel Alexander          Oncology Follow-up Visit:  Oct 17, 2022    Cancer diagnosis: cystic pancreatic tail adenocarcinoma  small subcentimeter pulmonary nodules seen on staging scans of unclear etiology.     Treatment to date: neoadjuvant FOLFIRINOX x4 cycles, 1/15/18-3/6/18  Difficulty tolerating neoadjuvant intent chemotherapy. Distal pancreatectomy performed April 17, 2018, no residual carcinoma seen on operative pathology.  Treated with four cycles of adjuvant chemotherapy with gemcitabine and Xeloda, completed September 2018.     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    COVID VACCINATION: April 2021 Moderna; booster Nov 2021, July 2022.      Referring physician: Dr. González Metz MD    Oncology History: She was previously healthy until she presented in early November 2017 with abdominal pain. CT abdomen on 11/1/17 showed acute pancreatitis (lipase 41,960) and a 3cm heterogenous pancreatic tail mass. The etiology of pancreatitis is unclear - no obstructing gallstone and not a heavy drinker. She was hospitalized for one week and followed up with Dr. González Metz in GI clinic.     11/29/17 -- endoscopic drainage of walled-off area of pancreatic necrosis by Dr. Metz. During the same procedure she had an EUS FNA of the pancreatic tail mass and pathology showed adenocarcinoma. The  mass measured 33 x 27 mm, encapsulated with solid and cystic components, rim calcification, and no evidence of invasion.     12/9/17 -- Staging CT chest/abd/pelvis showed several small pulmonary nodules (largest 3mm), unchanged mass in the pancreatic tail, mildly prominent mesenteric nodes thought likely reactive, and small right hepatic lobe hypodensities. Abdominal MRI on 12/10/17 showed hepatic hemangiomas, no evidence of metastatic disease to the liver.     1/8/18 - - oncology consultation, Dr. Monk. Recommendation: neoadjuvant intent chemotherapy with FOLFIRINOX.    1/15/18 - - cycle 1/day one FOLFIRINOX chemotherapy.    1/20 through 1/27/18 - - hospitalization at the [facility], for acute pancreatitis. Following three days of nausea, vomiting, pain. During this hospitalization: ERCP was attempted by Dr. Sanches with pancreatic stent placement, but pancreatic duct was completely obstructed and wire was unable to pass beyond the duct. Dr. Metz performed successful US guided cyst gastrostomy January 25, 2018.    1/31/18 - - oncology follow-up, DANTE Talavera. Neutropenic with ANC 0.4, thus defer cycle two of chemotherapy.    2/5/18 - - oncology follow-up, DANTE Junior.  Cycle 2/day one FOLFIRINOX chemotherapy. Patient endorses cold sensitivity secondary to oxaliplatin. Neulasta given for growth factor support. Due to significant neutropenia with cycle one.    2/11/18 - - ER evaluation for epigastric pain. Discharged to home from ER. Leukocytosis secondary to Neulasta given on February 8.    2/20/18 - - oncology follow-up, DANTE Junior. Cycle 3/day one FOLFIRINOX given without Neulasta. At patient request, oxaliplatin dose reduced by 10% due to neuropathy/cold sensitivity.    3/6/18 - - oncology follow-up, DANTE Talavera. Cycle 4/day one FOLFIRINOX chemotherapy.    3/13/18 - - CT chest/abdomen/pelvis - - IMPRESSION: 1. Minimal decrease in size and marked decrease in  enhancement and pancreatic mass since the comparison study. 2. No significant change in low dense lesions in the liver since comparison.    3/16/18 - - oncology follow-up Dr. Monk. Plan is to hold on further chemotherapy as surgery is scheduled for 4/17/18 4/17/18 - - surgery: PROCEDURE: Diagnostic laparoscopy, splenic flexure mobilization, intraoperative ultrasound, distal pancreatectomy, splenectomy, and cholecystectomy. SURGEON: Ja Byrd MD  Final surgical pathology showed necrotizing pancreatitis, no evidence of residual cancer, complete pathologic response, 26 benign lymph nodes.  FINAL DIAGNOSIS: A. DISTAL PANCREAS, SPLEEN AND OMENTUM, RESECTION: - Necrotizing pancreatitis with residual acellular mucin and foci of high grade dysplasia, status post neoadjuvant therapy   - Treatment response: complete (score 0)   - Twenty-six benign lymph nodes (0/26)   - Negative for residual carcinoma   - Pathologic staging: [id number]   - Focal infarction, metaplastic bone formation - Surgical margins are free of neoplasia - Unremarkable spleen and omentum   B. GALLBLADDER, CHOLECYSTECTOMY: - Benign gallbladder, biliary mucosa with no significant histologic abnormality - Negative for dysplasia COMMENT: Histologic sections demonstrate pools of acellular mucin, necrosis and scattered high grade dysplasia (PanIN-3) with no evidence of residual invasive carcinoma    4/30/18 - - surgical oncology follow-up, Dr. Byrd.  5/4/18 - - palliative care follow-up Dr. Snow. No specific recommendations required at this time.  5/14/18 - - oncology follow-up, Dr. Monk. Plan: adjuvant chemotherapy with gemcitabine and Xeloda.  5/30/18 - - cycle 1/day one adjuvant chemotherapy with gemcitabine and Xeloda.  Cycle three was dose reduced due to mucositis secondary to Xeloda.  8/28/18 - - oncology follow-up, DANTE Talavera. Cycle 4/day one gemcitabine and Xeloda.  9/17/18 - - CT chest/abdomen/pelvis - -IMPRESSION:  Resolving  postoperative changes. No new findings or evidence of metastatic disease.  9/24/18 - - oncology follow-up, Dr. Monk. PLAN: initiate active surveillance.  12/21/18--CT c/a/p--IMPRESSION:  1. Stable tiny 2 to 3 mm lung nodules as described above. This  suggests a benign etiology.  2. Two low density liver lesions. These appear to vary in appearance  depending on slight variations in timing of the bolus of contrast.  Likely no significant change. Recommend continued close surveillance.  3. No evidence of recurrent mass in the abdomen or pelvis. No  adenopathy.  12/28/18--scheduled oncology follow-up, Dr Monk. PATIENT NO-SHOW.  12/31/18--oncology follow-up, Dr Monk. Plan: Continue surveillance.  Increase Creon dose due to fatty food intolerance/weight loss.    3/29/19--CT c/a/p-- IMPRESSION: Stable scan compared to 12/21/2018 with the following  findings:  1. Several bilateral small pulmonary nodules, unchanged in appearance.  2. Two right hepatic small hypodense lesions, possibly hemangiomas.  These are also stable in appearance.  3. No apparent recurrent mass.  4/5/19 - - oncology follow-up, Dr. Monk.     7/1/19--CT c/a/p--IMPRESSION:  Stable examination with no convincing metastatic or  recurrent neoplasm.  7/8/19--oncology follow-up, Dr. Monk.    9/30/19 - - CT chest/abdomen/pelvis - - IMPRESSION:  1.  There is a small grouped area of nodularity in the right middle  lobe. Grouped nature favors an inflammatory etiology.  2.  The abdomen CT is stable with no convincing metastatic or  recurrent neoplasm.  10/7/19 - - oncology follow-up, Dr. Monk.    12/13/19 - - CT chest/abdomen/pelvis - - IMPRESSION:  1.  Stable abdomen/pelvis CT with no convincing metastatic or  recurrent neoplasm.  2. New/additional small focus of grouped nodularity in the right  middle lobe. Inflammatory etiology is still favored.  12/20/19 - - oncology follow-up, Dr. Monk.    4/13/20--CT c/a/p--IMPRESSION:  Interval resolution of suspected inflammatory  change in right middle lobe. Otherwise, stable examination.       4/20/20--oncology follow-up, Dr. Mokn.      8/3/20--CT c/a/p--IMPRESSION:  Stable examination with no convincing metastatic or  recurrent neoplasm.    August 10, 2020 -- oncology follow-up/virtual visit, Dr. Monk.  12/8/20--CT c/a/p--IMPRESSION:  1.  Stable appearance of distal pancreatectomy and splenectomy.  2.  Stable tiny nodules through both lungs.  3.  New area of likely atelectasis in the posterior left apex  measuring 6 mm. Follow-up would be recommended in six months or to  correspond with the next routine oncologic follow-up.  4.  Two stable small ill-defined lesions in the right hepatic lobe.    December 11, 2020 -- oncology follow-up/virtual visit, Dr. Monk.    3/29/21--CT c/a/p--                                                               IMPRESSION:  1.  Stable appearance of distal pancreatectomy and splenectomy.  2.  The questioned nodular area in the posterior left apex on the  previous exam has nearly completely resolved.  3.  There is a new 8 mm slightly lobulated nodular opacity in the  anterior left lower lobe. Given the appearance over the course of  three months this is likely benign, but follow-up is recommended.  4.  Interval slight increase in size of level 1 and level 2 left  axillary lymph nodes that are now prominent to borderline enlarged.  These could be due to reactive process, but attention is recommended  in future exams.  5. Two low-attenuation areas are again identified in the liver. One of  these appears minimally increased in size, but these are both still  below 1 cm in size. Continued attention to this area would be  recommended, but these are likely benign.    April 5, 2021 -- oncology follow-up/virtual visit, Dr. Monk.    July 28, 2021 --wedge resection surgery for new lung nodule seen on scan:  Final Diagnosis   Lung, left lower lobe, wedge resection:  -Carcinoid tumorlet, 0.3 cm  -Focal neuroendocrine cell  hyperplasia  -Granulomatous inflammation with central necrosis          11/22/21--CT chest: IMPRESSION:  Resolving postop change in the left lung. No new finding.  11/22/21--MRI abdomen --IMPRESSION: In this patient with history of pancreatic neuroendocrine  tumor status post distal pancreatectomy and splenectomy, again noted a  few small hepatic lesions which demonstrate no significant change in  size as compared to prior studies dating back to 12/13/2019 exam.  Although these are too small to characterize, they are likely benign  given their interval stability. No new suspicious focal hepatic  lesion. Recommend continued attention on follow-up.       November 29, 2021 -- oncology follow-up/virtual visit, Dr. Monk.    4/12/22--CT chest - IMPRESSION:   1.  No interval change to most recent comparison dated 11/22/2021.  2.  Status post left lower lobe wedge resection.  3.  A few unchanged punctate pulmonary nodules, likely benign.  4.  No new or enlarging pulmonary nodules.    4/12/22-MRI abdomen--IMPRESSION: In this patient with history of pancreatic neuroendocrine [sic]  tumor status post distal pancreatectomy and splenectomy, again noted a  few small hepatic lesions which demonstrate no significant change in  size as compared to prior studies dating back to 12/13/2019 exam.  Although these are too small to characterize, they are likely benign  given their interval stability. No new suspicious focal hepatic  Lesion.    April 22, 2022 -- oncology follow-up/virtual visit, Dr. Monk.    10/11/22--CT c/a/p--IMPRESSION:  1.  No evidence of recurrent or metastatic disease.  2.  Similar 2 mm pulmonary micronodules.  3.  Stable pancreatectomy and splenectomy.  4.  Left lower lobe wedge resection.    October 17, 2022 -- oncology follow-up/virtual visit, Dr. Monk.      Interim History:  Ms. Bangura joins me from her home for a virtual video visit.      She is feeling relatively well.  I noted she had a raspy voice, and she  updated me that she developed COVID infection in late May.  She had Paxlovid, and this helped mitigate many of the symptoms.  She appears to have been vaccinated, and subsequent to that in 2022, she received a second booster vaccination.  She states she is likely to pursue one of the multivalent boosters later this .      She had been having a raspy voice prior to that ongoing for a number of years, and she met with Dr. Enrique Ward of our Otolaryngology/ENT team last visit, which was 2021. At that time he had prescribed clobetasol, and she is taking gabapentin nightly.  He had recommended a 1 year followup that she still needs to go ahead and schedule.  She states that the raspy voice and the hoarseness of voice worsened since the COVID infection, but had been present prior to that.  She denies any other long-term sequelae out of the COVID infection.  She had a colonoscopy on 2018, and no polyps were found; thus, a 10 year followup was recommended.      Today she asked me whether or not she should go forward with the colonoscopy, as her brother  of colon cancer earlier this year.  Otherwise, she is 4-1/2 years out from her resection of her pancreatic adenocarcinoma, for which she had neoadjuvant-intent FOLFIRINOX.  She had a lot of necrotizing pancreatitis and pancreatitis in general type of issues, but she made it through the surgery okay.  She has been under active surveillance for at least 4 years, and her 10/11/2022 CT chest, abdomen and pelvis showed no evidence of recurrent malignancy.  Her  remains unremarkable and within normal limits as well.         Latest Reference Range & Units 10/11/22 13:08   Cancer Antigen 19-9 <=35 U/mL <2      Latest Reference Range & Units 21 09:10 21 09:44 22 11:57   Cancer Antigen 19-9 <=35 U/mL 2 [1] 3 [2] <2 [3]     Review Of Systems: Comprehensive 14-point ROS reviewed. Pertinent symptoms reviewed above per HPI.    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 She has a 30+ yr-old son. Works as . former smoker, quit 30 years ago. two glasses of wine per night, none since pancreatitis diagnosis     Allergies: none      Current Outpatient Medications:      acetaminophen (TYLENOL) 500 MG tablet, Take 2 tablets (1,000 mg) by mouth every 8 hours, Disp: 100 tablet, Rfl: 1     amylase-lipase-protease (CREON) 46386-83610 units CPEP per EC capsule, TAKE 4 CAPSULES WITH MEALS, 2 CAPSULES WITH SNACKS, UP TO 16 CAPSULES PER DAY, Disp: 1440 capsule, Rfl: 3     benzonatate (TESSALON) 100 MG capsule, Take 1 capsule (100 mg) by mouth 3 times daily as needed for cough, Disp: 30 capsule, Rfl: 0     blood glucose (ONETOUCH VERIO IQ) test strip, Use to test blood sugar 4 times daily or as directed., Disp: 400 each, Rfl: 3     clobetasol (TEMOVATE) 0.05 % external ointment, Apply topically 2 times daily, Disp: 45 g, Rfl: 1     clobetasol (TEMOVATE) 0.05 % GEL topical gel, Apply topically 2 times daily, Disp: 15 g, Rfl: 1     gabapentin (NEURONTIN) 100 MG capsule, Take 3 capsules (300 mg) by mouth 3 times daily, Disp: 270 capsule, Rfl: 3     ibuprofen (ADVIL/MOTRIN) 600 MG tablet, Take 1 tablet (600 mg) by mouth every 6 hours as needed, Disp: , Rfl:      metFORMIN (GLUCOPHAGE-XR) 500 MG 24 hr tablet, Take 1 tablet (500 mg) by mouth 2 times daily (with meals), Disp: 180 tablet, Rfl: 3     OneTouch Delica Lancets 33G MISC, 4 lancets daily, Disp: 150 each, Rfl: 3  No current facility-administered  medications for this visit.    Facility-Administered Medications Ordered in Other Visits:      heparin 100 UNIT/ML injection 5 mL, 5 mL, Intracatheter, Once, Art Guevara MD      Physical Exam:  In-person physical exam could not be performed today in context of a Virtual Visit. Observed physical assessments made today by visualizing the patient by video link:  Vitals - Patient Reported  Weight (Patient Reported): 61.2 kg (135 lb)  Pain Score: Moderate Pain (4)  Pain Loc: Abdomen (Low abdomen/digestive tract)             General/Constitutional: Generally appears well, not acutely ill.  HEENT: no scleral icterus, not jaundiced.  Respiratory: no labored breathing.  Musculoskeletal: appears to have full range of motion and adequate physical strength.  Skin: no jaundice, discoloration or other notable lesions.  Neurological: no evidence of tremors.  Psychiatric: no evident anxiety; fully alert and oriented with fluent speech.      The rest of a comprehensive physical examination is deferred due to nature of video visits.    Laboratory/Imaging Studies  Prior to and including the day of this visit, I personally reviewed the recent imaging scans. I released the pertinent recent imaging results to [facility] in advance of this visit, and reviewed the summary verbatim and in lay language during today's call.      ASSESSMENT/PLAN:  Mrs. Bangura is a 61 year old woman with resected pancreatic tail adenocarcinoma.  She had initial extensive and severe necrotizing pancreatitis upon initial diagnosis and hospitalization that delayed treatment.  We ultimately were able to begin neoadjuvant FOLFIRINOX for 4 cycles beginning in January.  By the time she had pancreatectomy by Dr. Byrd in mid April there was no evidence of residual carcinoma seen on operative pathology.  She completed subsequently 4 months of gemcitabine and Xeloda in the adjuvant setting.        She had a wedge resection of her lung-based carcinoid tumor that  is 3 mm in diameter.  This finding was completely unrelated to her pancreatic adenocarcinoma, which was resected now at this point 4-1/2 years ago.    I reviewed the standard-of-care NCCN guidelines for long-term surveillance, which call for a scan and an evaluation every 6-12 months up until the year 5 once the patient is at least 3 years out from surgery, as she is.  She is doing relatively well.  I offered her an option for reconvening with a CT chest, abdomen and pelvis and  anywhere between 6-12 months.  She opted for 12 month intervals, so I will go ahead and schedule that.     She will follow up directly with Dr. Ward's office for ENT followup preferably in person for evaluation of her hoarseness and other evaluation as needed.  We talked about cancer screening as age appropriate, including cervical cancer screening through her GYN if age appropriate, mammograms, etc., referral from her primary care provider for continued breast cancer screening.      We discussed a colonoscopy at her request.  Her last colonoscopy was performed by Dr. Marte on 12/11/2018.  He did not report any polyps.  He suggested a 10 year followup, which is appropriate.  We discussed that, and I suggested that should she develop any concerning symptoms that we could certainly move sooner, or if she had overlying concerns because of the death of her brother from colon cancer earlier this year, she could conceivably do that at the 5 year dee, which would be in a year from now.  Otherwise, I will go forward and order the CT scan and the labs for a year from now.  We will reconvene at that time.        VIRTUAL VISIT - DETAILS:    I have reviewed the note as documented above. This accurately captures the substance of my conversation with the patient.    Date of call: October 17, 2022   Start of call: 8:46 am  End of call: 9:00 am    Provider location: [facility] (academic office)  Patient location: Home      Mode of Video Visit:  Lynnette PEÑA spent 14 minutes in consultation, including history and discussion with the patient including review of recent lab values and radiologic imaging results.  An additional 11 minutes was spent on the day of the visit, including reviewing pertinent medical notes and documentation from other physicians and APPs who have evaluated and treated this patient, pertinent lab values, pathology and imaging results, personal review of radiologic images, discussing the case with referring providers and/or nurse coordinator team, post-visit orders, and all post-visit documentation.    Jovany Monk MD PhD

## 2022-10-17 ENCOUNTER — VIRTUAL VISIT (OUTPATIENT)
Dept: ONCOLOGY | Facility: CLINIC | Age: 62
End: 2022-10-17
Attending: INTERNAL MEDICINE
Payer: COMMERCIAL

## 2022-10-17 DIAGNOSIS — C25.0 MALIGNANT NEOPLASM OF HEAD OF PANCREAS (H): Primary | ICD-10-CM

## 2022-10-17 PROCEDURE — G0463 HOSPITAL OUTPT CLINIC VISIT: HCPCS | Mod: PN,RTG | Performed by: INTERNAL MEDICINE

## 2022-10-17 PROCEDURE — 99213 OFFICE O/P EST LOW 20 MIN: CPT | Mod: 95 | Performed by: INTERNAL MEDICINE

## 2022-10-17 NOTE — NURSING NOTE
Starting approximately two weeks ago patient states she  takes 2 100 mg tablets of Gabapentin twice daily.     Blanquita Alexander

## 2022-10-17 NOTE — LETTER
10/17/2022         RE: Kitty Bangura  [address] [place] [id number]        Dear Colleague,    Thank you for referring your patient, Kitty Bangura, to the [facility]. Please see a copy of my visit note below.    Kitty is a 61 year old who is being evaluated via a billable video visit.      How would you like to obtain your AVS? MyChart  If the video visit is dropped, the invitation should be resent by: Send to e-mail at: [email or web address]  Will anyone else be joining your video visit? No        Video-Visit Details        Type of service:  Video Visit              Distant Location (provider location):  On-site    Platform used for Video Visit: Daniel Alexander          Oncology Follow-up Visit:  Oct 17, 2022    Cancer diagnosis: cystic pancreatic tail adenocarcinoma  small subcentimeter pulmonary nodules seen on staging scans of unclear etiology.     Treatment to date: neoadjuvant FOLFIRINOX x4 cycles, 1/15/18-3/6/18  Difficulty tolerating neoadjuvant intent chemotherapy. Distal pancreatectomy performed April 17, 2018, no residual carcinoma seen on operative pathology.  Treated with four cycles of adjuvant chemotherapy with gemcitabine and Xeloda, completed September 2018.     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    COVID VACCINATION: April 2021 Moderna; booster Nov 2021, July 2022.      Referring physician: Dr. González Metz MD    Oncology History: She was previously healthy until she presented in early November 2017 with abdominal pain. CT abdomen on 11/1/17 showed acute pancreatitis (lipase 41,960) and a 3cm heterogenous pancreatic tail mass. The etiology of pancreatitis is unclear - no obstructing gallstone and not a heavy drinker. She was hospitalized for one  week and followed up with Dr. González Metz in GI clinic.     11/29/17 -- endoscopic drainage of walled-off area of pancreatic necrosis by Dr. Metz. During the same procedure she had an EUS FNA of the pancreatic tail mass and pathology showed adenocarcinoma. The mass measured 33 x 27 mm, encapsulated with solid and cystic components, rim calcification, and no evidence of invasion.     12/9/17 -- Staging CT chest/abd/pelvis showed several small pulmonary nodules (largest 3mm), unchanged mass in the pancreatic tail, mildly prominent mesenteric nodes thought likely reactive, and small right hepatic lobe hypodensities. Abdominal MRI on 12/10/17 showed hepatic hemangiomas, no evidence of metastatic disease to the liver.     1/8/18 - - oncology consultation, Dr. Monk. Recommendation: neoadjuvant intent chemotherapy with FOLFIRINOX.    1/15/18 - - cycle 1/day one FOLFIRINOX chemotherapy.    1/20 through 1/27/18 - - hospitalization at the [facility], for acute pancreatitis. Following three days of nausea, vomiting, pain. During this hospitalization: ERCP was attempted by Dr. Sanches with pancreatic stent placement, but pancreatic duct was completely obstructed and wire was unable to pass beyond the duct. Dr. Metz performed successful US guided cyst gastrostomy January 25, 2018.    1/31/18 - - oncology follow-up, DANTE Talavera. Neutropenic with ANC 0.4, thus defer cycle two of chemotherapy.    2/5/18 - - oncology follow-up, DANTE Junior.  Cycle 2/day one FOLFIRINOX chemotherapy. Patient endorses cold sensitivity secondary to oxaliplatin. Neulasta given for growth factor support. Due to significant neutropenia with cycle one.    2/11/18 - - ER evaluation for epigastric pain. Discharged to home from ER. Leukocytosis secondary to Neulasta given on February 8.    2/20/18 - - oncology follow-up, DANTE Junior. Cycle 3/day one FOLFIRINOX given without Neulasta. At patient request,  oxaliplatin dose reduced by 10% due to neuropathy/cold sensitivity.    3/6/18 - - oncology follow-up, DANTE Talavera. Cycle 4/day one FOLFIRINOX chemotherapy.    3/13/18 - - CT chest/abdomen/pelvis - - IMPRESSION: 1. Minimal decrease in size and marked decrease in enhancement and pancreatic mass since the comparison study. 2. No significant change in low dense lesions in the liver since comparison.    3/16/18 - - oncology follow-up Dr. Monk. Plan is to hold on further chemotherapy as surgery is scheduled for 4/17/18 4/17/18 - - surgery: PROCEDURE: Diagnostic laparoscopy, splenic flexure mobilization, intraoperative ultrasound, distal pancreatectomy, splenectomy, and cholecystectomy. SURGEON: Ja Byrd MD  Final surgical pathology showed necrotizing pancreatitis, no evidence of residual cancer, complete pathologic response, 26 benign lymph nodes.  FINAL DIAGNOSIS: A. DISTAL PANCREAS, SPLEEN AND OMENTUM, RESECTION: - Necrotizing pancreatitis with residual acellular mucin and foci of high grade dysplasia, status post neoadjuvant therapy   - Treatment response: complete (score 0)   - Twenty-six benign lymph nodes (0/26)   - Negative for residual carcinoma   - Pathologic staging: [id number]   - Focal infarction, metaplastic bone formation - Surgical margins are free of neoplasia - Unremarkable spleen and omentum   B. GALLBLADDER, CHOLECYSTECTOMY: - Benign gallbladder, biliary mucosa with no significant histologic abnormality - Negative for dysplasia COMMENT: Histologic sections demonstrate pools of acellular mucin, necrosis and scattered high grade dysplasia (PanIN-3) with no evidence of residual invasive carcinoma    4/30/18 - - surgical oncology follow-up, Dr. Byrd.  5/4/18 - - palliative care follow-up Dr. Snow. No specific recommendations required at this time.  5/14/18 - - oncology follow-up, Dr. Monk. Plan: adjuvant chemotherapy with gemcitabine and Xeloda.  5/30/18 - - cycle 1/day one adjuvant  chemotherapy with gemcitabine and Xeloda.  Cycle three was dose reduced due to mucositis secondary to Xeloda.  8/28/18 - - oncology follow-up, DANTE Talavera. Cycle 4/day one gemcitabine and Xeloda.  9/17/18 - - CT chest/abdomen/pelvis - -IMPRESSION:  Resolving postoperative changes. No new findings or evidence of metastatic disease.  9/24/18 - - oncology follow-up, Dr. Monk. PLAN: initiate active surveillance.  12/21/18--CT c/a/p--IMPRESSION:  1. Stable tiny 2 to 3 mm lung nodules as described above. This  suggests a benign etiology.  2. Two low density liver lesions. These appear to vary in appearance  depending on slight variations in timing of the bolus of contrast.  Likely no significant change. Recommend continued close surveillance.  3. No evidence of recurrent mass in the abdomen or pelvis. No  adenopathy.  12/28/18--scheduled oncology follow-up, Dr Monk. PATIENT NO-SHOW.  12/31/18--oncology follow-up, Dr Monk. Plan: Continue surveillance.  Increase Creon dose due to fatty food intolerance/weight loss.    3/29/19--CT c/a/p-- IMPRESSION: Stable scan compared to 12/21/2018 with the following  findings:  1. Several bilateral small pulmonary nodules, unchanged in appearance.  2. Two right hepatic small hypodense lesions, possibly hemangiomas.  These are also stable in appearance.  3. No apparent recurrent mass.  4/5/19 - - oncology follow-up, Dr. Monk.     7/1/19--CT c/a/p--IMPRESSION:  Stable examination with no convincing metastatic or  recurrent neoplasm.  7/8/19--oncology follow-up, Dr. Monk.    9/30/19 - - CT chest/abdomen/pelvis - - IMPRESSION:  1.  There is a small grouped area of nodularity in the right middle  lobe. Grouped nature favors an inflammatory etiology.  2.  The abdomen CT is stable with no convincing metastatic or  recurrent neoplasm.  10/7/19 - - oncology follow-up, Dr. Monk.    12/13/19 - - CT chest/abdomen/pelvis - - IMPRESSION:  1.  Stable abdomen/pelvis CT with no convincing metastatic  or  recurrent neoplasm.  2. New/additional small focus of grouped nodularity in the right  middle lobe. Inflammatory etiology is still favored.  12/20/19 - - oncology follow-up, Dr. Monk.    4/13/20--CT c/a/p--IMPRESSION:  Interval resolution of suspected inflammatory change in right middle lobe. Otherwise, stable examination.       4/20/20--oncology follow-up, Dr. Monk.      8/3/20--CT c/a/p--IMPRESSION:  Stable examination with no convincing metastatic or  recurrent neoplasm.    August 10, 2020 -- oncology follow-up/virtual visit, Dr. Monk.  12/8/20--CT c/a/p--IMPRESSION:  1.  Stable appearance of distal pancreatectomy and splenectomy.  2.  Stable tiny nodules through both lungs.  3.  New area of likely atelectasis in the posterior left apex  measuring 6 mm. Follow-up would be recommended in six months or to  correspond with the next routine oncologic follow-up.  4.  Two stable small ill-defined lesions in the right hepatic lobe.    December 11, 2020 -- oncology follow-up/virtual visit, Dr. Monk.    3/29/21--CT c/a/p--                                                               IMPRESSION:  1.  Stable appearance of distal pancreatectomy and splenectomy.  2.  The questioned nodular area in the posterior left apex on the  previous exam has nearly completely resolved.  3.  There is a new 8 mm slightly lobulated nodular opacity in the  anterior left lower lobe. Given the appearance over the course of  three months this is likely benign, but follow-up is recommended.  4.  Interval slight increase in size of level 1 and level 2 left  axillary lymph nodes that are now prominent to borderline enlarged.  These could be due to reactive process, but attention is recommended  in future exams.  5. Two low-attenuation areas are again identified in the liver. One of  these appears minimally increased in size, but these are both still  below 1 cm in size. Continued attention to this area would be  recommended, but these are likely  benign.    April 5, 2021 -- oncology follow-up/virtual visit, Dr. Monk.    July 28, 2021 --wedge resection surgery for new lung nodule seen on scan:  Final Diagnosis   Lung, left lower lobe, wedge resection:  -Carcinoid tumorlet, 0.3 cm  -Focal neuroendocrine cell hyperplasia  -Granulomatous inflammation with central necrosis          11/22/21--CT chest: IMPRESSION:  Resolving postop change in the left lung. No new finding.  11/22/21--MRI abdomen --IMPRESSION: In this patient with history of pancreatic neuroendocrine  tumor status post distal pancreatectomy and splenectomy, again noted a  few small hepatic lesions which demonstrate no significant change in  size as compared to prior studies dating back to 12/13/2019 exam.  Although these are too small to characterize, they are likely benign  given their interval stability. No new suspicious focal hepatic  lesion. Recommend continued attention on follow-up.       November 29, 2021 -- oncology follow-up/virtual visit, Dr. Monk.    4/12/22--CT chest - IMPRESSION:   1.  No interval change to most recent comparison dated 11/22/2021.  2.  Status post left lower lobe wedge resection.  3.  A few unchanged punctate pulmonary nodules, likely benign.  4.  No new or enlarging pulmonary nodules.    4/12/22-MRI abdomen--IMPRESSION: In this patient with history of pancreatic neuroendocrine [sic]  tumor status post distal pancreatectomy and splenectomy, again noted a  few small hepatic lesions which demonstrate no significant change in  size as compared to prior studies dating back to 12/13/2019 exam.  Although these are too small to characterize, they are likely benign  given their interval stability. No new suspicious focal hepatic  Lesion.    April 22, 2022 -- oncology follow-up/virtual visit, Dr. Monk.    10/11/22--CT c/a/p--IMPRESSION:  1.  No evidence of recurrent or metastatic disease.  2.  Similar 2 mm pulmonary micronodules.  3.  Stable pancreatectomy and splenectomy.  4.   Left lower lobe wedge resection.    2022 -- oncology follow-up/virtual visit, Dr. Monk.      Interim History:  Ms. Bangura joins me from her home for a virtual video visit.      She is feeling relatively well.  I noted she had a raspy voice, and she updated me that she developed COVID infection in late May.  She had Paxlovid, and this helped mitigate many of the symptoms.  She appears to have been vaccinated, and subsequent to that in 2022, she received a second booster vaccination.  She states she is likely to pursue one of the multivalent boosters later this .      She had been having a raspy voice prior to that ongoing for a number of years, and she met with Dr. Enrique Ward of our Otolaryngology/ENT team last visit, which was 2021. At that time he had prescribed clobetasol, and she is taking gabapentin nightly.  He had recommended a 1 year followup that she still needs to go ahead and schedule.  She states that the raspy voice and the hoarseness of voice worsened since the COVID infection, but had been present prior to that.  She denies any other long-term sequelae out of the COVID infection.  She had a colonoscopy on 2018, and no polyps were found; thus, a 10 year followup was recommended.      Today she asked me whether or not she should go forward with the colonoscopy, as her brother  of colon cancer earlier this year.  Otherwise, she is 4-1/2 years out from her resection of her pancreatic adenocarcinoma, for which she had neoadjuvant-intent FOLFIRINOX.  She had a lot of necrotizing pancreatitis and pancreatitis in general type of issues, but she made it through the surgery okay.  She has been under active surveillance for at least 4 years, and her 10/11/2022 CT chest, abdomen and pelvis showed no evidence of recurrent malignancy.  Her  remains unremarkable and within normal limits as well.         Latest Reference Range & Units 10/11/22 13:08   Cancer Antigen 19-9 <=35  U/mL <2      Latest Reference Range & Units 21 09:10 21 09:44 22 11:57   Cancer Antigen 19-9 <=35 U/mL 2 [1] 3 [2] <2 [3]     Review Of Systems: Comprehensive 14-point ROS reviewed. Pertinent symptoms reviewed above per HPI.    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 She has a 30+ yr-old son. Works as . former smoker, quit 30 years ago. two glasses of wine per night, none since pancreatitis diagnosis     Allergies: none      Current Outpatient Medications:      acetaminophen (TYLENOL) 500 MG tablet, Take 2 tablets (1,000 mg) by mouth every 8 hours, Disp: 100 tablet, Rfl: 1     amylase-lipase-protease (CREON) 82832-70471 units CPEP per EC capsule, TAKE 4 CAPSULES WITH MEALS, 2 CAPSULES WITH SNACKS, UP TO 16 CAPSULES PER DAY, Disp: 1440 capsule, Rfl: 3     benzonatate (TESSALON) 100 MG capsule, Take 1 capsule (100 mg) by mouth 3 times daily as needed for cough, Disp: 30 capsule, Rfl: 0     blood glucose (ONETOUCH VERIO IQ) test strip, Use to test blood sugar 4 times daily or as directed., Disp: 400 each, Rfl: 3     clobetasol (TEMOVATE) 0.05 % external ointment, Apply topically 2 times daily, Disp: 45 g, Rfl: 1     clobetasol (TEMOVATE) 0.05 % GEL topical gel, Apply topically 2 times daily, Disp: 15 g, Rfl: 1     gabapentin (NEURONTIN) 100 MG capsule, Take 3 capsules (300 mg) by mouth 3 times daily, Disp: 270 capsule, Rfl: 3     ibuprofen (ADVIL/MOTRIN) 600 MG tablet, Take 1 tablet (600 mg) by mouth every 6 hours as needed, Disp: , Rfl:       metFORMIN (GLUCOPHAGE-XR) 500 MG 24 hr tablet, Take 1 tablet (500 mg) by mouth 2 times daily (with meals), Disp: 180 tablet, Rfl: 3     OneTouch Delica Lancets 33G MISC, 4 lancets daily, Disp: 150 each, Rfl: 3  No current facility-administered medications for this visit.    Facility-Administered Medications Ordered in Other Visits:      heparin 100 UNIT/ML injection 5 mL, 5 mL, Intracatheter, Once, Art Guevara MD      Physical Exam:  In-person physical exam could not be performed today in context of a Virtual Visit. Observed physical assessments made today by visualizing the patient by video link:  Vitals - Patient Reported  Weight (Patient Reported): 61.2 kg (135 lb)  Pain Score: Moderate Pain (4)  Pain Loc: Abdomen (Low abdomen/digestive tract)             General/Constitutional: Generally appears well, not acutely ill.  HEENT: no scleral icterus, not jaundiced.  Respiratory: no labored breathing.  Musculoskeletal: appears to have full range of motion and adequate physical strength.  Skin: no jaundice, discoloration or other notable lesions.  Neurological: no evidence of tremors.  Psychiatric: no evident anxiety; fully alert and oriented with fluent speech.      The rest of a comprehensive physical examination is deferred due to nature of video visits.    Laboratory/Imaging Studies  Prior to and including the day of this visit, I personally reviewed the recent imaging scans. I released the pertinent recent imaging results to [employer] in advance of this visit, and reviewed the summary verbatim and in lay language during today's call.      ASSESSMENT/PLAN:  Mrs. Bangura is a 61 year old woman with resected pancreatic tail adenocarcinoma.  She had initial extensive and severe necrotizing pancreatitis upon initial diagnosis and hospitalization that delayed treatment.  We ultimately were able to begin neoadjuvant FOLFIRINOX for 4 cycles beginning in January.  By the time she had pancreatectomy by   Rochelle in mid April there was no evidence of residual carcinoma seen on operative pathology.  She completed subsequently 4 months of gemcitabine and Xeloda in the adjuvant setting.        She had a wedge resection of her lung-based carcinoid tumor that is 3 mm in diameter.  This finding was completely unrelated to her pancreatic adenocarcinoma, which was resected now at this point 4-1/2 years ago.    I reviewed the standard-of-care NCCN guidelines for long-term surveillance, which call for a scan and an evaluation every 6-12 months up until the year 5 once the patient is at least 3 years out from surgery, as she is.  She is doing relatively well.  I offered her an option for reconvening with a CT chest, abdomen and pelvis and  anywhere between 6-12 months.  She opted for 12 month intervals, so I will go ahead and schedule that.     She will follow up directly with Dr. Ward's office for ENT followup preferably in person for evaluation of her hoarseness and other evaluation as needed.  We talked about cancer screening as age appropriate, including cervical cancer screening through her GYN if age appropriate, mammograms, etc., referral from her primary care provider for continued breast cancer screening.      We discussed a colonoscopy at her request.  Her last colonoscopy was performed by Dr. Marte on 12/11/2018.  He did not report any polyps.  He suggested a 10 year followup, which is appropriate.  We discussed that, and I suggested that should she develop any concerning symptoms that we could certainly move sooner, or if she had overlying concerns because of the death of her brother from colon cancer earlier this year, she could conceivably do that at the 5 year dee, which would be in a year from now.  Otherwise, I will go forward and order the CT scan and the labs for a year from now.  We will reconvene at that time.    VIRTUAL VISIT - DETAILS:    I have reviewed the note as documented above. This  accurately captures the substance of my conversation with the patient.    Date of call: October 17, 2022   Start of call: 8:46 am  End of call: 9:00 am    Provider location: [facility] (academic office)  Patient location: Home    I spent 14 minutes in consultation, including history and discussion with the patient including review of recent lab values and radiologic imaging results.  An additional 11 minutes was spent on the day of the visit, including reviewing pertinent medical notes and documentation from other physicians and APPs who have evaluated and treated this patient, pertinent lab values, pathology and imaging results, personal review of radiologic images, discussing the case with referring providers and/or nurse coordinator team, post-visit orders, and all post-visit documentation.              Again, thank you for allowing me to participate in the care of your patient.      Sincerely,    Jovany Monk MD

## 2022-10-29 ENCOUNTER — HEALTH MAINTENANCE LETTER (OUTPATIENT)
Age: 62
End: 2022-10-29

## 2022-11-10 ASSESSMENT — ENCOUNTER SYMPTOMS
FREQUENCY: 1
DIARRHEA: 1
HEADACHES: 0
ABDOMINAL PAIN: 0
NAUSEA: 0
MYALGIAS: 0
CONSTIPATION: 0
HEMATURIA: 0
SHORTNESS OF BREATH: 1
HEMATOCHEZIA: 0
DYSURIA: 0
FEVER: 0
HEARTBURN: 0
WEAKNESS: 0
CHILLS: 0
NERVOUS/ANXIOUS: 1
JOINT SWELLING: 0
PARESTHESIAS: 1
DIZZINESS: 1
SORE THROAT: 0
COUGH: 1
BREAST MASS: 1
EYE PAIN: 0
ARTHRALGIAS: 1
PALPITATIONS: 0

## 2022-11-10 ASSESSMENT — ACTIVITIES OF DAILY LIVING (ADL): CURRENT_FUNCTION: NO ASSISTANCE NEEDED

## 2022-11-11 ENCOUNTER — OFFICE VISIT (OUTPATIENT)
Dept: FAMILY MEDICINE | Facility: CLINIC | Age: 62
End: 2022-11-11
Payer: COMMERCIAL

## 2022-11-11 VITALS
HEIGHT: 66 IN | HEART RATE: 59 BPM | SYSTOLIC BLOOD PRESSURE: 119 MMHG | WEIGHT: 136 LBS | BODY MASS INDEX: 21.86 KG/M2 | OXYGEN SATURATION: 96 % | RESPIRATION RATE: 12 BRPM | DIASTOLIC BLOOD PRESSURE: 79 MMHG | TEMPERATURE: 98 F

## 2022-11-11 DIAGNOSIS — G62.0 DRUG-INDUCED POLYNEUROPATHY (H): ICD-10-CM

## 2022-11-11 DIAGNOSIS — Z11.59 NEED FOR HEPATITIS C SCREENING TEST: ICD-10-CM

## 2022-11-11 DIAGNOSIS — D69.6 THROMBOCYTOPENIA (H): Primary | ICD-10-CM

## 2022-11-11 DIAGNOSIS — L43.8 ORAL LICHEN PLANUS: ICD-10-CM

## 2022-11-11 DIAGNOSIS — Z11.4 SCREENING FOR HIV (HUMAN IMMUNODEFICIENCY VIRUS): ICD-10-CM

## 2022-11-11 DIAGNOSIS — Z13.220 SCREENING FOR HYPERLIPIDEMIA: ICD-10-CM

## 2022-11-11 DIAGNOSIS — E11.9 TYPE 2 DIABETES MELLITUS WITHOUT COMPLICATION, WITHOUT LONG-TERM CURRENT USE OF INSULIN (H): ICD-10-CM

## 2022-11-11 PROBLEM — F10.20 ALCOHOL DEPENDENCE (H): Status: RESOLVED | Noted: 2021-03-16 | Resolved: 2022-11-11

## 2022-11-11 LAB
ALBUMIN SERPL BCG-MCNC: 4.3 G/DL (ref 3.5–5.2)
ALP SERPL-CCNC: 76 U/L (ref 35–104)
ALT SERPL W P-5'-P-CCNC: 13 U/L (ref 10–35)
ANION GAP SERPL CALCULATED.3IONS-SCNC: 10 MMOL/L (ref 7–15)
AST SERPL W P-5'-P-CCNC: 24 U/L (ref 10–35)
BILIRUB SERPL-MCNC: 0.5 MG/DL
BUN SERPL-MCNC: 17.7 MG/DL (ref 8–23)
CALCIUM SERPL-MCNC: 9.9 MG/DL (ref 8.8–10.2)
CHLORIDE SERPL-SCNC: 102 MMOL/L (ref 98–107)
CHOLEST SERPL-MCNC: 165 MG/DL
CREAT SERPL-MCNC: 0.65 MG/DL (ref 0.51–0.95)
CREAT UR-MCNC: 88 MG/DL
DEPRECATED HCO3 PLAS-SCNC: 27 MMOL/L (ref 22–29)
ERYTHROCYTE [DISTWIDTH] IN BLOOD BY AUTOMATED COUNT: 13.2 % (ref 10–15)
GFR SERPL CREATININE-BSD FRML MDRD: >90 ML/MIN/1.73M2
GLUCOSE SERPL-MCNC: 105 MG/DL (ref 70–99)
HBA1C MFR BLD: 6.3 % (ref 0–5.6)
HCT VFR BLD AUTO: 39.7 % (ref 35–47)
HDLC SERPL-MCNC: 70 MG/DL
HGB BLD-MCNC: 13.1 G/DL (ref 11.7–15.7)
LDLC SERPL CALC-MCNC: 79 MG/DL
MCH RBC QN AUTO: 31.2 PG (ref 26.5–33)
MCHC RBC AUTO-ENTMCNC: 33 G/DL (ref 31.5–36.5)
MCV RBC AUTO: 95 FL (ref 78–100)
MICROALBUMIN UR-MCNC: <12 MG/L
MICROALBUMIN/CREAT UR: NORMAL MG/G{CREAT}
NONHDLC SERPL-MCNC: 95 MG/DL
PLATELET # BLD AUTO: 403 10E3/UL (ref 150–450)
POTASSIUM SERPL-SCNC: 4.7 MMOL/L (ref 3.4–5.3)
PROT SERPL-MCNC: 7.2 G/DL (ref 6.4–8.3)
RBC # BLD AUTO: 4.2 10E6/UL (ref 3.8–5.2)
SODIUM SERPL-SCNC: 139 MMOL/L (ref 136–145)
TRIGL SERPL-MCNC: 78 MG/DL
TSH SERPL DL<=0.005 MIU/L-ACNC: 2.05 UIU/ML (ref 0.3–4.2)
WBC # BLD AUTO: 7.6 10E3/UL (ref 4–11)

## 2022-11-11 PROCEDURE — 86803 HEPATITIS C AB TEST: CPT | Performed by: NURSE PRACTITIONER

## 2022-11-11 PROCEDURE — 91313 COVID-19,PF,MODERNA BIVALENT: CPT | Performed by: NURSE PRACTITIONER

## 2022-11-11 PROCEDURE — 82043 UR ALBUMIN QUANTITATIVE: CPT | Performed by: NURSE PRACTITIONER

## 2022-11-11 PROCEDURE — 36415 COLL VENOUS BLD VENIPUNCTURE: CPT | Performed by: NURSE PRACTITIONER

## 2022-11-11 PROCEDURE — 99396 PREV VISIT EST AGE 40-64: CPT | Mod: 25 | Performed by: NURSE PRACTITIONER

## 2022-11-11 PROCEDURE — 87389 HIV-1 AG W/HIV-1&-2 AB AG IA: CPT | Performed by: NURSE PRACTITIONER

## 2022-11-11 PROCEDURE — 83036 HEMOGLOBIN GLYCOSYLATED A1C: CPT | Performed by: NURSE PRACTITIONER

## 2022-11-11 PROCEDURE — 85027 COMPLETE CBC AUTOMATED: CPT | Performed by: NURSE PRACTITIONER

## 2022-11-11 PROCEDURE — 80053 COMPREHEN METABOLIC PANEL: CPT | Performed by: NURSE PRACTITIONER

## 2022-11-11 PROCEDURE — 80061 LIPID PANEL: CPT | Performed by: NURSE PRACTITIONER

## 2022-11-11 PROCEDURE — 0134A COVID-19,PF,MODERNA BIVALENT: CPT | Performed by: NURSE PRACTITIONER

## 2022-11-11 PROCEDURE — 84443 ASSAY THYROID STIM HORMONE: CPT | Performed by: NURSE PRACTITIONER

## 2022-11-11 PROCEDURE — 99214 OFFICE O/P EST MOD 30 MIN: CPT | Mod: 25 | Performed by: NURSE PRACTITIONER

## 2022-11-11 RX ORDER — METFORMIN HCL 500 MG
500 TABLET, EXTENDED RELEASE 24 HR ORAL 2 TIMES DAILY WITH MEALS
Qty: 180 TABLET | Refills: 3 | Status: SHIPPED | OUTPATIENT
Start: 2022-11-11 | End: 2023-12-11

## 2022-11-11 ASSESSMENT — ENCOUNTER SYMPTOMS
HEMATOCHEZIA: 0
HEADACHES: 0
ARTHRALGIAS: 1
MYALGIAS: 0
SORE THROAT: 0
CONSTIPATION: 0
BREAST MASS: 1
SHORTNESS OF BREATH: 1
WEAKNESS: 0
FREQUENCY: 1
ABDOMINAL PAIN: 0
HEMATURIA: 0
NAUSEA: 0
JOINT SWELLING: 0
PALPITATIONS: 0
DYSURIA: 0
CHILLS: 0
COUGH: 1
DIZZINESS: 1
DIARRHEA: 1
EYE PAIN: 0
FEVER: 0
HEARTBURN: 0

## 2022-11-11 ASSESSMENT — ACTIVITIES OF DAILY LIVING (ADL): CURRENT_FUNCTION: NO ASSISTANCE NEEDED

## 2022-11-11 NOTE — PROGRESS NOTES
"   SUBJECTIVE:   CC: Kitty is an 61 year old who presents for preventive health visit. She has a HO pancreatic cancer in past and follows oncology. She has been seeing ENT regarding sores in mouth which steroid creams help but also noticing hoarseness, throat clearing, globus sensation, and chronic cough. She will get intermittent pains in feet and shins. HO diabetes currently stable.     Patient has been advised of split billing requirements and indicates understanding: Yes  Healthy Habits:     In general, how would you rate your overall health?  Good    Frequency of exercise:  6-7 days/week    Duration of exercise:  45-60 minutes    Do you usually eat at least 4 servings of fruit and vegetables a day, include whole grains    & fiber and avoid regularly eating high fat or \"junk\" foods?  Yes    Taking medications regularly:  Yes    Ability to successfully perform activities of daily living:  No assistance needed    Home Safety:  No safety concerns identified    Hearing Impairment:  No hearing concerns    In the past 6 months, have you been bothered by leaking of urine? Yes    In general, how would you rate your overall mental or emotional health?  Good      PHQ-2 Total Score: 0    Additional concerns today:  Yes    Today's PHQ-2 Score:   PHQ-2 ( 1999 [employer]) 11/10/2022   Q1: Little interest or pleasure in doing things 0   Q2: Feeling down, depressed or hopeless 0   PHQ-2 Score 0   PHQ-2 Total Score (12-17 Years)- Positive if 3 or more points; Administer PHQ-A if positive -   Q1: Little interest or pleasure in doing things Not at all   Q2: Feeling down, depressed or hopeless Not at all   PHQ-2 Score 0       Abuse: Current or Past (Physical, Sexual or Emotional) - No  Do you feel safe in your environment? Yes      Social History     Tobacco Use     Smoking status: Former     Packs/day: 0.50     Years: 5.00     Pack years: 2.50     Types: Cigarettes     Start date: 1/1/1974     Quit date: 1981     Years since " quittin.8     Smokeless tobacco: Never   Substance Use Topics     Alcohol use: No     Alcohol/week: 21.0 standard drinks     Comment: Now quit since Oct 31.  Moderate drinker in past     If you drink alcohol do you typically have >3 drinks per day or >7 drinks per week? No    Alcohol Use 11/10/2022   Prescreen: >3 drinks/day or >7 drinks/week? Not Applicable   Prescreen: >3 drinks/day or >7 drinks/week? -       Reviewed orders with patient.  Reviewed health maintenance and updated orders accordingly - Yes  Lab work is in process  Labs reviewed in EPIC    Breast Cancer Screening:    FHS-7: No flowsheet data found.  click delete button to remove this line now  Mammogram Screening: Recommended mammography every 1-2 years with patient discussion and risk factor consideration  Pertinent mammograms are reviewed under the imaging tab.    History of abnormal Pap smear: NO - age 30-65 PAP every 5 years with negative HPV co-testing recommended  PAP / HPV Latest Ref Rng & Units 2021 10/5/2018   PAP   Negative for Intraepithelial Lesion or Malignancy (NILM) -   PAP (Historical) - - NIL   HPV16 Negative Negative Negative   HPV18 Negative Negative Negative   HRHPV Negative Negative Negative     Reviewed and updated as needed this visit by clinical staff   Tobacco  Allergies  Meds              Reviewed and updated as needed this visit by Provider                 Past Medical History:   Diagnosis Date     Arthritis Post chemo    Suspected     Diabetes (H)      History of blood transfusion      History of total splenectomy 2018     Necrotizing pancreatitis      Necrotizing pancreatitis 2017     Pancreatic cancer (H)      Pancreatic mass 2017    On CT abd/pelvis: 3 cm heterogeneous mass within the tail of the pancreas. A few calcifications are noted along the lateral margin of the mass.     Pneumonia     2016     PONV (postoperative nausea and vomiting)       Past Surgical History:   Procedure Laterality  Date     ABDOMEN SURGERY  1983    Ruptured tubal pregnancies, additional surgeries followed     BACK SURGERY  2004    L5, S1 discectomy     BREAST SURGERY  2003    Breast reduction     COLONOSCOPY  2008     COLONOSCOPY N/A 12/11/2018    Procedure: colonoscopy;  Surgeon: Lobito Marte MD;  Location: [facility]     DAVINCI LOBECTOMY LUNG Left 7/28/2021    Procedure: Robot-assisted thoracoscopic left lower lobe wedge resection;  Surgeon: René Phillips MD;  [facility]     ENDOSCOPIC RETROGRADE CHOLANGIOPANCREATOGRAM N/A 1/25/2018    Procedure: COMBINED ENDOSCOPIC RETROGRADE CHOLANGIOPANCREATOGRAPHY, PLACE TUBE/STENT;  Endoscopic retrograde cholangiopancreatogram with stent placement and cyst drainage bile ;  Surgeon: Vito Sanches MD;  [facility]     ENDOSCOPIC ULTRASOUND LOWER GASTROINTESTIONAL TRACT (GI) N/A 1/25/2018    Procedure: ENDOSCOPIC ULTRASOUND LOWER GASTROINTESTIONAL TRACT (GI);  Endoscopic Ultrasound with guided Cyst-Gastrostomy;  Surgeon: González Metz MD;  [facility]     ENDOSCOPIC ULTRASOUND, ESOPHAGOSCOPY, GASTROSCOPY, DUODENOSCOPY (EGD), NECROSECTOMY N/A 12/4/2017    Procedure: ENDOSCOPIC ULTRASOUND, ESOPHAGOSCOPY, GASTROSCOPY, DUODENOSCOPY (EGD), NECROSECTOMY;  Esophagogastroduodenoscopy, with  Necrosectomy and stents replacement  ;  Surgeon: González Metz MD;  [facility]     ENDOSCOPIC ULTRASOUND, ESOPHAGOSCOPY, GASTROSCOPY, DUODENOSCOPY (EGD), NECROSECTOMY N/A 12/12/2017    Procedure: ENDOSCOPIC ULTRASOUND, ESOPHAGOSCOPY, GASTROSCOPY, DUODENOSCOPY (EGD), NECROSECTOMY;  Esophagogastroduodenoscopy with Necrosectomy, Balloon Dilation, stent removal X3 and Cystgastrostomy stent Placement X2;  Surgeon: Guru Cecilia Staples MD;  [facility]     ESOPHAGOSCOPY, GASTROSCOPY, DUODENOSCOPY (EGD), COMBINED N/A 11/29/2017    Procedure: COMBINED ENDOSCOPIC ULTRASOUND, ESOPHAGOSCOPY, GASTROSCOPY, DUODENOSCOPY (EGD), FINE NEEDLE ASPIRATE/BIOPSY;   Endoscopic Ultrasound with cystgastrostomy and fine needle aspiration ;  Surgeon: González Metz MD;  [facility]     ESOPHAGOSCOPY, GASTROSCOPY, DUODENOSCOPY (EGD), COMBINED N/A 2018    Procedure: COMBINED ESOPHAGOSCOPY, GASTROSCOPY, DUODENOSCOPY (EGD);  EGD;  Surgeon: González Metz MD;  [facility]     ESOPHAGOSCOPY, GASTROSCOPY, DUODENOSCOPY (EGD), COMBINED N/A 2018    Procedure: COMBINED ESOPHAGOSCOPY, GASTROSCOPY, DUODENOSCOPY (EGD), REMOVE FOREIGN BODY;;  Surgeon: González Metz MD;  Location: [facility]     GYN SURGERY  1983    Multiple ectopic pregnancies, reconnect,       INSERT PORT VASCULAR ACCESS Right 2018    Procedure: INSERT PORT VASCULAR ACCESS;  Chest Port Placement - right;  Surgeon: Chanda Lawson PA-C;  [facility]     IR PORT REMOVAL RIGHT  2019     LAPAROSCOPY DIAGNOSTIC (GENERAL) N/A 2018    Procedure: LAPAROSCOPY DIAGNOSTIC (GENERAL);  Diagnostic Laparoscopy; Open Distal Pancreatectomy And Splenectomy, splenic flexure mobilization, cholecystectomy, intraoperative ultrasound;  Surgeon: Ja Byrd MD;  [facility]     MAMMOPLASTY REDUCTION Bilateral 2006     PANCREATECTOMY, SPLENECTOMY N/A 2018    Procedure: PANCREATECTOMY, SPLENECTOMY;;  Surgeon: Ja Byrd MD;  [facility]     DE LAMNOTMY INCL W/DCMPRSN NRV ROOT 1 INTRSPC LUMBR      Description: Hemilaminectomy With Disc Removal One Lumbar Interspace;  Recorded: 2008;  Comments: Right L5-S1 with resultant loss of right patellar and achilles reflex     REMOVE PORT VASCULAR ACCESS Right 2019    Procedure: Right Port Removal;  Surgeon: Juan J Donaldson PA-C;  [facility]     ZZC  DELIVERY ONLY      Description:  Section;  Recorded: 12/10/2009;       Review of Systems   Constitutional: Negative for chills and fever.   HENT: Negative for congestion, ear pain, hearing loss and sore throat.    Eyes: Positive for visual  "disturbance. Negative for pain.   Respiratory: Positive for cough and shortness of breath.    Cardiovascular: Negative for chest pain, palpitations and peripheral edema.   Gastrointestinal: Positive for diarrhea. Negative for abdominal pain, constipation, heartburn, hematochezia and nausea.   Breasts:  Positive for tenderness and breast mass. Negative for discharge.   Genitourinary: Positive for frequency and pelvic pain. Negative for dysuria, genital sores, hematuria, urgency, vaginal bleeding and vaginal discharge.   Musculoskeletal: Positive for arthralgias. Negative for joint swelling and myalgias.   Skin: Negative for rash.   Neurological: Positive for dizziness. Negative for weakness and headaches.   Psychiatric/Behavioral: Negative for mood changes.          OBJECTIVE:   /79   Pulse 59   Temp 98  F (36.7  C) (Oral)   Resp 12   Ht 1.676 m (5' 6\")   Wt 61.7 kg (136 lb)   SpO2 96%   BMI 21.95 kg/m    Physical Exam  GENERAL: healthy, alert and no distress  EYES: Eyes grossly normal to inspection, PERRL and conjunctivae and sclerae normal  HENT: ear canals and TM's normal, nose and mouth without ulcers or lesions  NECK: no adenopathy, no asymmetry, masses, or scars and thyroid normal to palpation  RESP: lungs clear to auscultation - no rales, rhonchi or wheezes  BREAST: normal without masses, tenderness or nipple discharge and no palpable axillary masses or adenopathy  CV: regular rate and rhythm, normal S1 S2, no S3 or S4, no murmur, click or rub, no peripheral edema and peripheral pulses strong  ABDOMEN: soft, nontender, no hepatosplenomegaly, no masses and bowel sounds normal  MS: no gross musculoskeletal defects noted, no edema  SKIN: no suspicious lesions or rashes  NEURO: Normal strength and tone, mentation intact and speech normal  PSYCH: mentation appears normal, affect normal/bright    Diagnostic Test Results:  Labs reviewed in Epic    ASSESSMENT/PLAN:   Kitty was seen today for physical and " diabetes.    Diagnoses and all orders for this visit:    Thrombocytopenia (H)    Screening for HIV (human immunodeficiency virus)  -     HIV Antigen Antibody Combo; Future  -     HIV Antigen Antibody Combo    Need for hepatitis C screening test  -     Hepatitis C Screen Reflex to HCV RNA Quant and Genotype; Future  -     Hepatitis C Screen Reflex to HCV RNA Quant and Genotype    Type 2 diabetes mellitus without complication, without long-term current use of insulin (H)  -     Albumin Random Urine Quantitative with Creat Ratio; Future  -     HEMOGLOBIN A1C; Future  -     Cancel: BASIC METABOLIC PANEL; Future  -     Lipid panel reflex to direct LDL Non-fasting; Future  -     CBC with platelets; Future  -     Comprehensive metabolic panel (BMP + Alb, Alk Phos, ALT, AST, Total. Bili, TP); Future  -     TSH; Future  -     Albumin Random Urine Quantitative with Creat Ratio  -     HEMOGLOBIN A1C  -     Lipid panel reflex to direct LDL Non-fasting  -     CBC with platelets  -     Comprehensive metabolic panel (BMP + Alb, Alk Phos, ALT, AST, Total. Bili, TP)  -     TSH  -     metFORMIN (GLUCOPHAGE XR) 500 MG 24 hr tablet; Take 1 tablet (500 mg) by mouth 2 times daily (with meals)    Screening for hyperlipidemia  -     Lipid panel reflex to direct LDL Non-fasting; Future  -     Lipid panel reflex to direct LDL Non-fasting    Drug-induced polyneuropathy (H)    Oral lichen planus    Other orders  -     REVIEW OF HEALTH MAINTENANCE PROTOCOL ORDERS  -     COVID-19,PF,MODERNA BIVALENT    - may continue Gabapentin for polyneuropathy. Diabetes is currently stable.   - cholesterol is stable, no need to start a statin medication since your total 10-year ASCVD risk score is low.  Please recheck in 1 year.  - The 10-year ASCVD risk score (Tyler DK, et al., 2019) is: 5.9%    Values used to calculate the score:      Age: 61 years      Sex: Female      Is Non- : No      Diabetic: Yes      Tobacco smoker: No       "Systolic Blood Pressure: 119 mmHg      Is BP treated: No      HDL Cholesterol: 70 mg/dL      Total Cholesterol: 165 mg/dL    -Your thyroid level is normal.  -Your hemoglobin A1c is stable.  Please continue metformin and work on a low carbohydrate diet.   -Your kidney function and your liver function are stable.   -Continue to follow ENT for globus sensation, mouth sores, and hoarseness.   -Platelets are stable.     Patient has been advised of split billing requirements and indicates understanding: Yes      COUNSELING:  Reviewed preventive health counseling, as reflected in patient instructions    Estimated body mass index is 21.95 kg/m  as calculated from the following:    Height as of this encounter: 1.676 m (5' 6\").    Weight as of this encounter: 61.7 kg (136 lb).        She reports that she quit smoking about 41 years ago. Her smoking use included cigarettes. She started smoking about 48 years ago. She has a 2.50 pack-year smoking history. She has never used smokeless tobacco.      Counseling Resources:  ATP IV Guidelines  Pooled Cohorts Equation Calculator  Breast Cancer Risk Calculator  BRCA-Related Cancer Risk Assessment: FHS-7 Tool  FRAX Risk Assessment  ICSI Preventive Guidelines  Dietary Guidelines for Americans, 2010  USDA's MyPlate  ASA Prophylaxis  Lung CA Screening    [employer]  "

## 2022-11-12 LAB
HCV AB SERPL QL IA: NONREACTIVE
HIV 1+2 AB+HIV1 P24 AG SERPL QL IA: NONREACTIVE

## 2022-11-13 NOTE — RESULT ENCOUNTER NOTE
David Tang    Your labs overall look very stable. Good news!!!     If you have any questions, please let me know.     It was a pleasure meeting you!    Felisha

## 2022-11-21 NOTE — PROGRESS NOTES
"  Otolaryngology Clinic    Name: Kitty Bangura  MRN: [id number]  Age: 61 year old  : 2022      Chief Complaint:   Follow up     History of Present Illness:   Kitty Bangura is a 61 year old female with a history of oral lichen planus and neuropathic facial pain who presents for follow up. At our last virtual visit on 8/3/2021, she was doing well and taking gabapentin 200 mg occasionally to control symptoms.    Today, she reports that her mouth has been doing well with occasional intermittent flares which she treats with clobetasol. She has been having trouble with swallowing, and sometimes her pills get stuck. She has also noticed constant postnasal drip and voice changes.     Review of Systems:   Pertinent items are noted in HPI or as in patient entered ROS below, remainder of complete ROS is negative.    ENT ROS 2021   Constitutional: Appetite change, Unexplained fatigue   Neurology: Numbness   Psychology: -   Ears, Nose, Throat: Ringing/noise in ears, Nasal congestion or drainage, Hoarseness   Cardiopulmonary: Cough, Breathing problems   Gastrointestinal/Genitourinary: Diarrhea   Musculoskeletal: Sore or stiff joints, Swollen joints, Back pain   Allergy/Immunology: Allergies or hay fever   Hematologic: Easy bruising   Endocrine: Heat or cold intolerance, Frequent urination   Other: -        Physical Exam:   Ht 1.651 m (5' 5\")   Wt 61.2 kg (135 lb)   BMI 22.47 kg/m       PHYSICAL EXAMINATION:    Constitutional:  The patient was unaccompanied, well-groomed, and in no acute distress.    Skin:  Warm and pink.    Neurologic:  Alert and oriented x 3.  CN's III-XII within normal limits.  Voice normal.   Psychiatric:  The patient's affect was calm, cooperative, and appropriate.    Respiratory:  Breathing comfortably without stridor or exertion of accessory muscles.    Eyes: Extraocular movement intact.    Head:  Normocephalic and atraumatic.  No lesions or scars.    Nose:  Sinuses were " non-tender.  Anterior rhinoscopy revealed midline septum and absence of purulence or polyps.    OC/OP:  Normal tongue, floor of mouth, buccal mucosa, and palate.  No lesions or masses on inspection or palpation.  No abnormal lymph tissue in the oropharynx.  The pterygoid region is non-tender.  Lichen planus lesions present on both interior cheeks and both lateral tongues posterior laterally, not overly inflamed.  Indirect laryngoscopy with a mirror: vocal chords not inflamed, full mobility, no edema evident on exam.   Neck:  Supple with normal laryngeal and tracheal landmarks.  The parotid beds were without masses.  No palpable thyroid.  Lymphatic:  There is no palpable lymphadenopathy in the neck.      Assessment and Plan:  Kitty Bangura is a 61 year old female with a history of oral lichen planus and neuropathic facial pain who presents for follow up. For lichen planus, she should continue clobetasol as needed for flares. For swallowing problems, I recommended modified barium swallow test with speech pathology, and I feel she should be seen in [facility]'s voice clinic. For postnasal drip and voice changes, I will have her start Atrovent. I will follow up with a telephone visit in 6 weeks.        Scribe Disclosure:  I, Art Valerio, am serving as a scribe to document services personally performed by Enrique Ward MD at this visit, based upon the provider's statements to me. All documentation has been reviewed by the aforementioned provider prior to being entered into the official medical record.

## 2022-11-22 ENCOUNTER — OFFICE VISIT (OUTPATIENT)
Dept: OTOLARYNGOLOGY | Facility: CLINIC | Age: 62
End: 2022-11-22
Payer: COMMERCIAL

## 2022-11-22 VITALS — WEIGHT: 135 LBS | BODY MASS INDEX: 22.49 KG/M2 | HEIGHT: 65 IN

## 2022-11-22 DIAGNOSIS — K12.1 STOMATITIS AND MUCOSITIS: Primary | ICD-10-CM

## 2022-11-22 DIAGNOSIS — K12.30 STOMATITIS AND MUCOSITIS: Primary | ICD-10-CM

## 2022-11-22 DIAGNOSIS — R13.12 OROPHARYNGEAL DYSPHAGIA: ICD-10-CM

## 2022-11-22 PROCEDURE — 99213 OFFICE O/P EST LOW 20 MIN: CPT | Performed by: OTOLARYNGOLOGY

## 2022-11-22 RX ORDER — IPRATROPIUM BROMIDE 21 UG/1
2 SPRAY, METERED NASAL EVERY 12 HOURS
Qty: 1680 ML | Refills: 0 | Status: SHIPPED | OUTPATIENT
Start: 2022-11-22 | End: 2023-01-21

## 2022-11-22 ASSESSMENT — PAIN SCALES - GENERAL: PAINLEVEL: NO PAIN (0)

## 2022-11-22 NOTE — LETTER
"2022       RE: Kitty Bangura  [address] [place] [id number]     Dear Colleague,    Thank you for referring your patient, Kitty Bangura, to the [facility] [place] at [facility]. Please see a copy of my visit note below.      Otolaryngology Clinic    Name: Kitty Bangura  MRN: [id number]  Age: 61 year old  : 2022      Chief Complaint:   Follow up     History of Present Illness:   Kitty Bangura is a 61 year old female with a history of oral lichen planus and neuropathic facial pain who presents for follow up. At our last virtual visit on 8/3/2021, she was doing well and taking gabapentin 200 mg occasionally to control symptoms.    Today, she reports that her mouth has been doing well with occasional intermittent flares which she treats with clobetasol. She has been having trouble with swallowing, and sometimes her pills get stuck. She has also noticed constant postnasal drip and voice changes.     Review of Systems:   Pertinent items are noted in HPI or as in patient entered ROS below, remainder of complete ROS is negative.   [facility] ROS 2021   Constitutional: Appetite change, Unexplained fatigue   Neurology: Numbness   Psychology: -   Ears, Nose, Throat: Ringing/noise in ears, Nasal congestion or drainage, Hoarseness   Cardiopulmonary: Cough, Breathing problems   Gastrointestinal/Genitourinary: Diarrhea   Musculoskeletal: Sore or stiff joints, Swollen joints, Back pain   Allergy/Immunology: Allergies or hay fever   Hematologic: Easy bruising   Endocrine: Heat or cold intolerance, Frequent urination   Other: -        Physical Exam:   Ht 1.651 m (5' 5\")   Wt 61.2 kg (135 lb)   BMI 22.47 kg/m       PHYSICAL EXAMINATION:    Constitutional:  The patient was unaccompanied, well-groomed, and in no acute distress.    Skin:  Warm and pink.    Neurologic:  Alert and oriented x 3.  CN's " III-XII within normal limits.  Voice normal.   Psychiatric:  The patient's affect was calm, cooperative, and appropriate.    Respiratory:  Breathing comfortably without stridor or exertion of accessory muscles.    Eyes: Extraocular movement intact.    Head:  Normocephalic and atraumatic.  No lesions or scars.    Nose:  Sinuses were non-tender.  Anterior rhinoscopy revealed midline septum and absence of purulence or polyps.    OC/OP:  Normal tongue, floor of mouth, buccal mucosa, and palate.  No lesions or masses on inspection or palpation.  No abnormal lymph tissue in the oropharynx.  The pterygoid region is non-tender.  Lichen planus lesions present on both interior cheeks and both lateral tongues posterior laterally, not overly inflamed.  Indirect laryngoscopy with a mirror: vocal chords not inflamed, full mobility, no edema evident on exam.   Neck:  Supple with normal laryngeal and tracheal landmarks.  The parotid beds were without masses.  No palpable thyroid.  Lymphatic:  There is no palpable lymphadenopathy in the neck.      Assessment and Plan:  Kitty Bangura is a 61 year old female with a history of oral lichen planus and neuropathic facial pain who presents for follow up. For lichen planus, she should continue clobetasol as needed for flares. For swallowing problems, I recommended modified barium swallow test with speech pathology, and I feel she should be seen in [facility]'s voice clinic. For postnasal drip and voice changes, I will have her start Atrovent. I will follow up with a telephone visit in 6 weeks.        Scribe Disclosure:  I, Art Valerio, am serving as a scribe to document services personally performed by Enrique Ward MD at this visit, based upon the provider's statements to me. All documentation has been reviewed by the aforementioned provider prior to being entered into the official medical record.      Again, thank you for allowing me to participate in the care of your patient.       Sincerely,    Enrique Ward MD

## 2022-11-22 NOTE — PATIENT INSTRUCTIONS
"You were seen in the clinic today by Dr. Ward. If you have any questions or concerns after your appointment, please call the clinic at [phone]. Press \"1\" for scheduling, press \"3\" for nurse advice.    2.   The following has been recommended for you based upon your appointment today:   -A referral has been placed for a modified barium swallow and an exam with our [facility] voice clinc.   -Take Atrovent to treat your runny nose symptoms.   -       3.   Plan to return the clinic in 6-8 weeks via phone visit.    Catina BROOKS, RN  [facility]  Department of Otolaryngology  [phone]     "

## 2022-11-22 NOTE — TELEPHONE ENCOUNTER
FUTURE VISIT INFORMATION      FUTURE VISIT INFORMATION:    Date: 1/24/23    Time: 11am    [place] [facility] [place]FORMATION:    Referring provider:  Dr. Ward    Referring providers clinic:  [facility] ENT     Reason for visit/diagnosis  trouble with swallowing    RECORDS REQUESTED FROM:       Clinic name Comments Records Status Imaging Status   [facility] ENt 11/22/22- note from Enrique Ward MD    7/20/21- PFT   5/8/18- EGD [facility]  11/11/22- note from Kathy Jurado APRN CNP Epic     Imaging  12/9/22- XR Video Swallow - Pending     10/11/22- CT Chest  4/12/22- Ct Chest  Epic

## 2022-11-30 ENCOUNTER — TELEPHONE (OUTPATIENT)
Dept: FAMILY MEDICINE | Facility: CLINIC | Age: 62
End: 2022-11-30

## 2022-11-30 DIAGNOSIS — E11.9 TYPE 2 DIABETES MELLITUS WITHOUT COMPLICATION, WITHOUT LONG-TERM CURRENT USE OF INSULIN (H): Primary | ICD-10-CM

## 2022-11-30 NOTE — TELEPHONE ENCOUNTER
Message -----   From: Willem Kitty LUCAS   Sent: 11/25/2022   1:45 PM CST   To: [facility] Reception [facility]   Subject: Diabetic Supplies                                  Topic: Non-Medical Question.      I am almost out of test strips for blood glucose testing and forgot to inquire at my physical appointment with Kathy Jurado. I am on Medicare and understand I can get free testing supplies if I have a prescription through my doctor.  I need strips and possibly a new meter as I believe mine is out of date. I ve been using it since 2018.  Hope you can assist me with this. Thank you!

## 2022-11-30 NOTE — TELEPHONE ENCOUNTER
Pt sent a request via CRM asking for new glucose meter and testing supplies now that she is on Medicare.    Pt last seen 11/9/21.  Due for office visit.    Also, previous orders were for testing 4 times daily.  Medicare may cover testing less often since pt appears to be on Metformin only.    Sherita French RN

## 2022-12-01 ENCOUNTER — MYC MEDICAL ADVICE (OUTPATIENT)
Dept: OTOLARYNGOLOGY | Facility: CLINIC | Age: 62
End: 2022-12-01

## 2022-12-01 DIAGNOSIS — K12.30 STOMATITIS AND MUCOSITIS: Primary | ICD-10-CM

## 2022-12-01 DIAGNOSIS — N89.8 VAGINAL ITCHING: ICD-10-CM

## 2022-12-01 DIAGNOSIS — N95.2 ATROPHIC VAGINITIS: ICD-10-CM

## 2022-12-01 DIAGNOSIS — K12.1 STOMATITIS AND MUCOSITIS: Primary | ICD-10-CM

## 2022-12-01 RX ORDER — CLOBETASOL PROPIONATE 0.5 MG/G
OINTMENT TOPICAL 2 TIMES DAILY
Qty: 45 G | Refills: 3 | Status: SHIPPED | OUTPATIENT
Start: 2022-12-01 | End: 2022-12-08 | Stop reason: ALTCHOICE

## 2022-12-02 RX ORDER — LANCETS
EACH MISCELLANEOUS
Qty: 100 EACH | Refills: 6 | Status: SHIPPED | OUTPATIENT
Start: 2022-12-02

## 2022-12-02 RX ORDER — GLUCOSAMINE HCL/CHONDROITIN SU 500-400 MG
CAPSULE ORAL
Qty: 100 EACH | Refills: 3 | Status: SHIPPED | OUTPATIENT
Start: 2022-12-02

## 2022-12-08 RX ORDER — CLOBETASOL PROPIONATE 0.05 MG/G
GEL TOPICAL 2 TIMES DAILY
Qty: 30 G | Refills: 1 | Status: SHIPPED | OUTPATIENT
Start: 2022-12-08

## 2022-12-09 ENCOUNTER — THERAPY VISIT (OUTPATIENT)
Dept: SPEECH THERAPY | Facility: CLINIC | Age: 62
End: 2022-12-09
Attending: OTOLARYNGOLOGY
Payer: COMMERCIAL

## 2022-12-09 ENCOUNTER — ANCILLARY PROCEDURE (OUTPATIENT)
Dept: GENERAL RADIOLOGY | Facility: CLINIC | Age: 62
End: 2022-12-09
Attending: OTOLARYNGOLOGY
Payer: COMMERCIAL

## 2022-12-09 DIAGNOSIS — K12.30 STOMATITIS AND MUCOSITIS: ICD-10-CM

## 2022-12-09 DIAGNOSIS — R13.12 OROPHARYNGEAL DYSPHAGIA: ICD-10-CM

## 2022-12-09 DIAGNOSIS — K12.1 STOMATITIS AND MUCOSITIS: ICD-10-CM

## 2022-12-09 PROCEDURE — 74230 X-RAY XM SWLNG FUNCJ C+: CPT | Mod: GC | Performed by: RADIOLOGY

## 2022-12-09 PROCEDURE — 92611 MOTION FLUOROSCOPY/SWALLOW: CPT | Mod: GN | Performed by: SPEECH-LANGUAGE PATHOLOGIST

## 2022-12-09 PROCEDURE — 92610 EVALUATE SWALLOWING FUNCTION: CPT | Mod: GN | Performed by: SPEECH-LANGUAGE PATHOLOGIST

## 2022-12-09 RX ORDER — BARIUM SULFATE 400 MG/ML
30 SUSPENSION ORAL ONCE
Status: COMPLETED | OUTPATIENT
Start: 2022-12-09 | End: 2022-12-09

## 2022-12-09 RX ADMIN — BARIUM SULFATE 30 ML: 400 SUSPENSION ORAL at 10:34

## 2022-12-12 NOTE — PROGRESS NOTES
Speech-Language Pathology Department   EVALUATION  [facility]  Video Swallow Study      12/09/22 1000   General Information   Type Of Visit Initial   Start Of Care Date 12/09/22   Referring Physician Dr. Enrique Ward   Orders Evaluate And Treat   Orders Comment Video Swallow Study   Medical Diagnosis Oropharyngeal dysphagia   Onset Of Illness/injury Or Date Of Surgery   (Pt reports onset was several months ago)   Precautions/limitations No Known Precautions/limitations   Hearing WFL   Pertinent History of Current Problem/OT: Additional Occupational Profile Info Kitty Bangura is a 62-year-old woman who reports increased trouble swallowing over the past several months. She has trouble swallowing pills and feels like they get stuck. She points to her mid neck as the location pills get stuck.  She denies trouble with solid foods but does chew thoroughly due to other medical issues. She also notes increased pain when she talks a lot and coughing/throat clearing throughout the day. She demonstrated multiple episodes of coughing and throat clearing with hoarse vocal quality noted. She denies reflux. Her PMH is significant for oral lichen planus, neuropathic facial pain, history of pancreatic cancer s/p chemotherapy, open pancreatectomy and splenectomy, cholecystectomy, and multiple EGD procedures. She reports no recent procedures.  Her  was present during the evaluation and reports she has been throat clearing/coughing for some time but it does not correlate with PO intake.   Respiratory Status Room air   Prior Level Of Function Swallowing   Prior Level Of Function Comment Regular solid and thin liquids   Home/community Accessibility Comments (flowsheet Row) Independent   General Observations Pt highly pleasant and cooperative.   Patient/family Goals She would like to determine why pills get stuck.   Clinical Swallow Evaluation   Oral Musculature generally  intact   Structural Abnormalities none present   Dentition present and adequate   Mucosal Quality good   Mandibular Strength and Mobility intact   Oral Labial Strength and Mobility WFL   Lingual Strength and Mobility WFL   Velar Elevation intact   Buccal Strength and Mobility intact   Laryngeal Function Cough;Throat clear;Voicing initiated   Oral Musculature Comments Pt demonstrated throat clearing an coughing throughout session which was not in relation to po trials.   Additional evaluation(s) completed today Yes   Rationale for completing additional evaluation Video swallow study to further assess pharyngoesophageal phases of the swallow.   VFSS Eval: Radiology   Radiologist Resident   Views Taken left lateral;A/P   Physical Location of Procedure [facility] Imagin Room 2   VFSS Eval: Thin Liquid Texture Trial   Mode of Presentation, Thin Liquid cup;self-fed   Order of Presentation Series 1, 2, 3, 17 (A/P)   Preparatory Phase WFL   Oral Phase, Thin Liquid WFL   Pharyngeal Phase, Thin Liquid WFL   Rosenbek's Penetration Aspiration Scale: Thin Liquid Trial Results 1 - no aspiration, contrast does not enter airway   Diagnostic Statement No aspiration/penetration noted on thin liquid trials. No pharyngeal residue. Pt does hold a very small amount of the bolus in the anterior mouth and then swallows this on a second swallow. She was not aware of this and when prompted, could swallow all the liquid in one swallow.   VFSS Eval: Slightly Thick Liquids   Mode of Presentation cup;self-fed   Order of Presentation Series 4, 5   Preparatory Phase WFL   Oral Phase WFL   Pharyngeal Phase WFL   Rosenbek's Penetration Aspiration Scale 1 - no aspiration, contrast does not enter airway   Diagnostic Statement No aspiration/penetration noted on slightly thick liquid trials.   VFSS Eval: Mildly Thick Liquids    Mode of Presentation cup;self-fed   Order of Presentation Series 6, 7   Preparatory Phase WFL   Oral Phase WFL   Pharyngeal  Phase WFL   Rosenbek's Penetration Aspiration Scale 1 - no aspiration, contrast does not enter airway   Diagnostic Statement No aspiration/penetration noted on mildy thick liquid trials.   VFSS Eval: Puree Solid Texture Trial   Mode of Presentation, Puree spoon;self-fed   Order of Presentation Series 8, 9, 16 (A/P)   Preparatory Phase WFL   Oral Phase, Puree WFL   Pharyngeal Phase, Puree WFL   Rosenbek's Penetration Aspiration Scale: Puree Food Trial Results 1 - no aspiration, contrast does not enter airway   Diagnostic Statement No aspiration/penetration noted on puree consistency trials. No pharyngeal residue noted after the completed swallow.   VFSS Eval: Regular Texture Trial (Solid)   Mode of Presentation self-fed   Order of Presentation Cookie Series 10, 11; Barium tablet series 12, 13, 14, 15   Preparatory Phase WFL   Oral Phase WFL   Pharyngeal Phase WFL   Rosenbek's Penetration Aspiration Scale 1 - no aspiration, contrast does not enter airway   Diagnostic Statement No aspiration/penetration noted on solid consistency trials. Adequate passage of the barium tablet through the oral and pharyngeal phases. It does get held up in the esopahgus at the aortic arch as well as at the LES. Ultimately, it was cleared however pt reported significant feeling of the pill being stuck even after passage.   Educational Assessment   Barriers to Learning No barriers   Esophageal Phase of Swallow   Patient reports or presents with symptoms of esophageal dysphagia Yes   Esophageal sweep performed during today s vidofluoroscopic exam  Yes   Esophageal comments Barium tablet hung up in the mid esophagus and was eventually cleared with subsequent sips. Please refer to radiologist report for further details.   Swallow Eval: Clinical Impressions   Skilled Criteria for Therapy Intervention Other (see comments)  (Requires further evaluation of voice and cough)   Functional Assessment Scale (FAS) 7   Dysphagia Outcome Severity Scale  (LUIS E) Level 7 - LUIS E   Treatment Diagnosis Pt demonstrates symptosm consistent with irritable larynx syndrome and esophageal dysphagia   Diet texture recommendations Regular diet;Thin liquids (level 0)   Predicted Duration of Therapy Intervention (days/wks) Evaluation only   Risks and Benefits of Treatment have been explained. Yes   Patient, family and/or staff in agreement with Plan of Care Yes   Clinical Impression Comments Kitty Bangura demonstrates safe functional oropharyngeal swallow. No aspiration/penetration noted on any consistency presented. Adequate ROM and strength of oral mechanism demonstrated. Pt does demonstrate some bolus fractionation with a small amount of liquid remaining in her mouth and advancing to swallow on a second swallow. No pharyngeal residue noted after completed swallow. Adequate UES opening on each consistency. Barium tablet passed through the oral and pharyngeal phrases. barium tablet did get hung up in the mid esophagus and was cleared with multiple sips of thin liquid. Recommend she continue regular consistency solids and thin liquids. She is scheduled to see Dr. Coughlin next month and would benefit from evaluation for laryngeal tension dysphonia and chronic cough. She may also benefit from esophageal work up pending laryngology evaluation. Reviewed anatomy and physiology of the swallow with patient and her .  Reviewed images from the study including description of current swallow function.  Provided education and recommendations.  Patient and her 's questions were answered within scope of practice.   Total Session Time   SLP Eval: oral/pharyngeal swallow function, clinical minutes (12594) 10   SLP Eval: VideoFluoroscopic Swallow function Minutes (39262) 25   Total Evaluation Time 35       Thank you for the referral of Kitty Bangura. If you have any questions about this report, please contact me using the information below.      Nikki Myers MS,  CCC-SLP  Speech-Language Pathology  [facility]  Department of Otolaryngology/D&T - 4th floor  Phone: [phone]  Email: [email or web address]

## 2023-01-17 ENCOUNTER — VIRTUAL VISIT (OUTPATIENT)
Dept: OTOLARYNGOLOGY | Facility: CLINIC | Age: 63
End: 2023-01-17
Payer: COMMERCIAL

## 2023-01-17 VITALS — WEIGHT: 135 LBS | BODY MASS INDEX: 21.69 KG/M2 | HEIGHT: 66 IN

## 2023-01-17 DIAGNOSIS — L43.8 ORAL LICHEN PLANUS: Primary | ICD-10-CM

## 2023-01-17 PROCEDURE — 99212 OFFICE O/P EST SF 10 MIN: CPT | Mod: 95 | Performed by: OTOLARYNGOLOGY

## 2023-01-17 ASSESSMENT — PAIN SCALES - GENERAL: PAINLEVEL: NO PAIN (0)

## 2023-01-17 NOTE — NURSING NOTE
"Chief Complaint   Patient presents with     RECHECK     8 week follow up       Height 1.664 m (5' 5.5\"), weight 61.2 kg (135 lb), not currently breastfeeding.    Kristina Esquivel, EMT    "

## 2023-01-17 NOTE — LETTER
1/17/2023       RE: Kitty Bangura  [address] [place] [id number]     Dear Colleague,    Thank you for referring your patient, Kitty Bangura, to the [facility] [place] [facility]. Please see a copy of my visit note below.    Phone  Visit. Lichen planus somewhat imtpoving  Will see voice clnic 5 minutes total time'       Again, thank you for allowing me to participate in the care of your patient.      Sincerely,    Enrique Ward MD

## 2023-01-17 NOTE — LETTER
1/17/2023      RE: Kitty Bangura  [address] [place] [id number]       Phone  Visit. Lichen planus somewhat imtpoving  Will see voice clnic 5 minutes total time'       Enrique Ward MD

## 2023-01-22 NOTE — PROGRESS NOTES
"HISTORY OF PRESENT ILLNESS: Kitty Bangura is a 62 year old female with a history of oral lichen planus and neuropathic facial pain.  She has been followed by Dr. Torres for the lichen planus.  On his last visit with her it was noted that her mouth had been doing well but she had been trouble with swallowing and sometimes having her pills get stuck.  She also noted constant postnasal drip and voice changes.    A swallow study a videofluoroscopic swallow study was obtained on 12/9/2022.  During that exam she had a safe functional oropharyngeal swallow.  There was no aspiration or penetration noted.  There was adequate upper esophageal sphincter opening on each consistency.  The barium tablet did get hung up in the mid esophagus and was cleared with multiple sips of thin liquid.  She has noted solid food will stick in this area.  During the interview with speech-language pathology it was noted that she had issues with throat clearing and coughing that did not correlate with p.o. intake.  She also had intermittent difficulties with hoarseness.  She notes vocal fatigue and hoarseness with voice use.  The pitch and quality of her voice has changed over the last few years.  She does get anterior neck pain with anything greater than mild talking.      Patient Supplied Answers To VHI Questionnaire  Voice Handicap Index (VHI-10) 1/20/2023   My voice makes it difficult for people to hear me 2   People have difficulty understanding me in a noisy room 2   My voice difficulties restrict my personal and social life.  2   I feel left out of conversations because of my voice 3   My voice problem causes me to lose income 0   I feel as though I have to strain to produce voice 2   The clarity of my voice is unpredictable 3   My voice problem upsets me 2   My voice makes me feel handicapped 2   People ask, \"What's wrong with your voice?\" 3   VHI-10 21               PAST MEDICAL HISTORY:   Past Medical History:   Diagnosis Date     " Arthritis Post chemo    Suspected     Diabetes (H)      History of blood transfusion      History of total splenectomy 04/17/2018     Necrotizing pancreatitis      Necrotizing pancreatitis 12/9/2017     Pancreatic cancer (H)      Pancreatic mass 11/1/2017    On CT abd/pelvis: 3 cm heterogeneous mass within the tail of the pancreas. A few calcifications are noted along the lateral margin of the mass.     Pneumonia     2016     PONV (postoperative nausea and vomiting)        PAST SURGICAL HISTORY:   Past Surgical History:   Procedure Laterality Date     ABDOMEN SURGERY  1983    Ruptured tubal pregnancies, additional surgeries followed     BACK SURGERY  2004    L5, S1 discectomy     BREAST SURGERY  2003    Breast reduction     COLONOSCOPY  2008     COLONOSCOPY N/A 12/11/2018    Procedure: colonoscopy;  Surgeon: Lobito Marte MD;  Location: [facility]     DAVINCI LOBECTOMY LUNG Left 7/28/2021    Procedure: Robot-assisted thoracoscopic left lower lobe wedge resection;  Surgeon: René Phillips MD;  Location: [facility]     ENDOSCOPIC RETROGRADE CHOLANGIOPANCREATOGRAM N/A 1/25/2018    Procedure: COMBINED ENDOSCOPIC RETROGRADE CHOLANGIOPANCREATOGRAPHY, PLACE TUBE/STENT;  Endoscopic retrograde cholangiopancreatogram with stent placement and cyst drainage bile ;  Surgeon: Vito Sanches MD;  Location: [facility]     ENDOSCOPIC ULTRASOUND LOWER GASTROINTESTIONAL TRACT (GI) N/A 1/25/2018    Procedure: ENDOSCOPIC ULTRASOUND LOWER GASTROINTESTIONAL TRACT (GI);  Endoscopic Ultrasound with guided Cyst-Gastrostomy;  Surgeon: González Metz MD;  Location: [facility]     ENDOSCOPIC ULTRASOUND, ESOPHAGOSCOPY, GASTROSCOPY, DUODENOSCOPY (EGD), NECROSECTOMY N/A 12/4/2017    Procedure: ENDOSCOPIC ULTRASOUND, ESOPHAGOSCOPY, GASTROSCOPY, DUODENOSCOPY (EGD), NECROSECTOMY;  Esophagogastroduodenoscopy, with  Necrosectomy and stents replacement  ;  Surgeon: González Metz MD;  [facility]: [facility]     ENDOSCOPIC ULTRASOUND, ESOPHAGOSCOPY,  GASTROSCOPY, DUODENOSCOPY (EGD), NECROSECTOMY N/A 2017    Procedure: ENDOSCOPIC ULTRASOUND, ESOPHAGOSCOPY, GASTROSCOPY, DUODENOSCOPY (EGD), NECROSECTOMY;  Esophagogastroduodenoscopy with Necrosectomy, Balloon Dilation, stent removal X3 and Cystgastrostomy stent Placement X2;  Surgeon: Guru Cecilia Staples MD;  Location: [facility]     ESOPHAGOSCOPY, GASTROSCOPY, DUODENOSCOPY (EGD), COMBINED N/A 2017    Procedure: COMBINED ENDOSCOPIC ULTRASOUND, ESOPHAGOSCOPY, GASTROSCOPY, DUODENOSCOPY (EGD), FINE NEEDLE ASPIRATE/BIOPSY;  Endoscopic Ultrasound with cystgastrostomy and fine needle aspiration ;  Surgeon: González Metz MD;  [facility]: [facility]     ESOPHAGOSCOPY, GASTROSCOPY, DUODENOSCOPY (EGD), COMBINED N/A 2018    Procedure: COMBINED ESOPHAGOSCOPY, GASTROSCOPY, DUODENOSCOPY (EGD);  EGD;  Surgeon: González Metz MD;  Location:  GI     ESOPHAGOSCOPY, GASTROSCOPY, DUODENOSCOPY (EGD), COMBINED N/A 2018    Procedure: COMBINED ESOPHAGOSCOPY, GASTROSCOPY, DUODENOSCOPY (EGD), REMOVE FOREIGN BODY;;  Surgeon: González Metz MD;  Location:  GI     GYN SURGERY  1983    Multiple ectopic pregnancies, reconnect,  1988     INSERT PORT VASCULAR ACCESS Right 2018    Procedure: INSERT PORT VASCULAR ACCESS;  Chest Port Placement - right;  Surgeon: Chanda Lawson PA-C;  [facility]: [facility]     IR PORT REMOVAL RIGHT  2019     LAPAROSCOPY DIAGNOSTIC (GENERAL) N/A 2018    Procedure: LAPAROSCOPY DIAGNOSTIC (GENERAL);  Diagnostic Laparoscopy; Open Distal Pancreatectomy And Splenectomy, splenic flexure mobilization, cholecystectomy, intraoperative ultrasound;  Surgeon: Ja Byrd MD;  Location: [facility]     MAMMOPLASTY REDUCTION Bilateral 2006     PANCREATECTOMY, SPLENECTOMY N/A 2018    Procedure: PANCREATECTOMY, SPLENECTOMY;;  Surgeon: Ja Byrd MD;  [facility]: [facility]     MO LAMNOTMY INCL W/DCMPRSN NRV ROOT 1 INTRSPC LUMBR      Description: Hemilaminectomy With  Disc Removal One Lumbar Interspace;  Recorded: 2008;  Comments: Right L5-S1 with resultant loss of right patellar and achilles reflex     REMOVE PORT VASCULAR ACCESS Right 2019    Procedure: Right Port Removal;  Surgeon: Juan J Donaldson PA-C;  Location:  OR     [facility]  DELIVERY ONLY      Description:  Section;  Recorded: 12/10/2009;       FAMILY HISTORY:   Family History   Problem Relation Age of Onset     Heart Disease Father      Hyperlipidemia Father      Heart Disease Brother      Diabetes Mother      Hypertension Mother      Obesity Mother      Depression Mother      Diabetes Maternal Grandfather      Hypertension Maternal Grandfather      Obesity Maternal Grandfather      Diabetes Sister      Hypertension Sister      Depression Sister      Anxiety Disorder Sister      Obesity Sister      Hypertension Brother      Hyperlipidemia Brother      Heart Disease Brother      Breast Cancer Cousin      Breast Cancer Cousin      Breast Cancer Cousin      Colon Cancer Other      Other Cancer Other         Mesothelioma     Depression Sister      Anxiety Disorder Brother      Cancer Brother 64        colon cancer     Anxiety Disorder Son      Depression Son      Mental Illness Sister         Schizophrenia     Hypertension Sister      Obesity Maternal Grandmother      Obesity Sister      Diabetes Nephew      Hypertension Brother        SOCIAL HISTORY:   Social History     Tobacco Use     Smoking status: Former     Packs/day: 0.50     Years: 5.00     Pack years: 2.50     Types: Cigarettes     Start date: 1974     Quit date: 1981     Years since quittin.0     Smokeless tobacco: Never   Substance Use Topics     Alcohol use: No     Alcohol/week: 21.0 standard drinks     Comment: Now quit since Oct 31.  Moderate drinker in past       REVIEW OF SYSTEMS: Ten point review of systems was performed and is negative except for:    ENT ROS 2023   Constitutional: -   Neurology: Dizzy spells    Psychology: -   Ears, Nose, Throat: Ringing/noise in ears, Nasal congestion or drainage, Trouble swallowing, Hoarseness   Cardiopulmonary: Cough   Gastrointestinal/Genitourinary: Heartburn/indigestion, Diarrhea   Musculoskeletal: Sore or stiff joints, Back pain   Allergy/Immunology: -   Hematologic: -   Endocrine: Thirst, Heat or cold intolerance, Frequent urination   Other: -        ALLERGIES: Patient has no known allergies.    MEDICATIONS:   Current Outpatient Medications   Medication Sig Dispense Refill     acetaminophen (TYLENOL) 500 MG tablet Take 2 tablets (1,000 mg) by mouth every 8 hours 100 tablet 1     alcohol swab prep pads Use to swab area of injection/avel as directed. 100 each 3     amylase-lipase-protease (CREON) 84725-31879 units CPEP per EC capsule TAKE 4 CAPSULES WITH MEALS AND 2 CAPSULES WITH SNACKS, UP TO 16 CAPSULES PER DAY 1440 capsule 3     blood glucose (NO BRAND SPECIFIED) test strip Use to test blood sugar 2 times daily or as directed. To accompany: Blood Glucose Monitor Brands: per insurance. 100 strip 6     blood glucose (ONETOUCH VERIO IQ) test strip Use to test blood sugar 4 times daily or as directed. 400 each 3     blood glucose calibration (NO BRAND SPECIFIED) solution To accompany: Blood Glucose Monitor Brands: per insurance. 1 each 3     blood glucose monitoring (NO BRAND SPECIFIED) meter device kit Use to test blood sugar 2 times daily or as directed. Preferred blood glucose meter OR supplies to accompany: Blood Glucose Monitor Brands: per insurance. 1 kit 0     clobetasol (TEMOVATE) 0.05 % GEL topical gel Apply topically 2 times daily 30 g 1     clobetasol (TEMOVATE) 0.05 % GEL topical gel Apply topically 2 times daily 15 g 1     gabapentin (NEURONTIN) 100 MG capsule Take 3 capsules (300 mg) by mouth 3 times daily 270 capsule 3     metFORMIN (GLUCOPHAGE XR) 500 MG 24 hr tablet Take 1 tablet (500 mg) by mouth 2 times daily (with meals) 180 tablet 3     OneTouch Delica Lancets  33G MISC 4 lancets daily 150 each 3     thin (NO BRAND SPECIFIED) lancets Use with lanceting device. To accompany: Blood Glucose Monitor Brands: per insurance. 100 each 6         PHYSICAL EXAMINATION:  She  is awake, alert and in no apparent distress.    Her tympanic membranes are clear and intact bilaterally. External auditory canals are clear.  Nasal exam shows a mild septal deviation without obstruction.  Examination of the oral cavity shows no suspicious lesions.  There is symmetric movement of the tongue and soft palate.    The oropharynx is clear.  Her neck is supple without significant adenopathy.  There is tenderness to palpation of the thyrohyoid membrane.  The thyrohyoid space is also relatively tight.  Pulse is regular.  Upper airway is clear.  Cranial nerves II-XII are grossly intact.       PROCEDURE: A flexible laryngoscopy  was performed.  Informed consent was obtained and a time out was performed. 2% lidocaine and 0.025% oxymetazoline was sprayed into the nasal cavity and allowed 3 to 5 minutes for effect. The scope was passed through the right sided nostril. Examination showed the vocal folds to be mobile and meet in the midline.  Admitted and upper frequencies there was a persistent glottic gap present that appear to be associated with greater tension of the vocal folds.  Hard closure between the arytenoids was present throughout the examination during phonation.  Mild erythema and mild edema was seen on the medial surface of both arytenoids.  The remainder of the glottic and supraglottic mucosa was quite healthy and minimally inflamed.  No nodules, polyps or ulcerations are seen.  There is mild inflammation at the posterior commissure.   The membranous vocal folds show no inflammation. With phonation there is moderate contraction of the supraglottic larynx.  The hypopharynx is otherwise clear as is the subglottis.     1/24/2023 Exam      Videostroboscopy was performed to evaluate the mucosal wave  integrity and dynamics.  A full mucosal wave was seen bilaterally.  There were no areas of mucosal wave breakdown but limited was Riis but limited restriction was seen intermittently on both vocal folds but more probably on the right, consistent with increased muscle tension.  There was fair periodicity seen bilaterally.  The mucosal wave amplitude was  inconsistent but at low pitches a full wave could be seen..  A persistent glottic gap was seen at the mid to upper range associated with a harsher quality to the voice.   . At higher pitches there was a further loss of stable vocal stability resulting in a deterioration of the mucosal wave analysis.     No additional lesions or disruptions of the mucosal wave were seen .    Open phase(mucus on VF)      Closed phase        IMPRESSION/PLAN: A significant muscle tension dysphonia resulting in thyrohyoid muscle tenderness as well as irritation of the interarytenoid mucosa which is a potential explanation for her throat clearing.  This is likely a indirect consequence of her multiple abdominal surgeries resulting in decreased breath support over the years.  I am recommending a trial of speech therapy both for cough suppression as well as improving her breath support and reducing her muscle tension dysphonia.  Video was reviewed with her and all questions were answered.  I will have her follow-up on an as-needed basis.

## 2023-01-24 ENCOUNTER — OFFICE VISIT (OUTPATIENT)
Dept: OTOLARYNGOLOGY | Facility: CLINIC | Age: 63
End: 2023-01-24
Payer: COMMERCIAL

## 2023-01-24 ENCOUNTER — PRE VISIT (OUTPATIENT)
Dept: OTOLARYNGOLOGY | Facility: CLINIC | Age: 63
End: 2023-01-24

## 2023-01-24 VITALS
BODY MASS INDEX: 22.78 KG/M2 | TEMPERATURE: 97.6 F | DIASTOLIC BLOOD PRESSURE: 73 MMHG | HEIGHT: 65 IN | SYSTOLIC BLOOD PRESSURE: 122 MMHG | WEIGHT: 136.7 LBS

## 2023-01-24 DIAGNOSIS — R13.12 OROPHARYNGEAL DYSPHAGIA: Primary | ICD-10-CM

## 2023-01-24 DIAGNOSIS — R09.89 CHRONIC THROAT CLEARING: ICD-10-CM

## 2023-01-24 DIAGNOSIS — R49.0 DYSPHONIA: ICD-10-CM

## 2023-01-24 DIAGNOSIS — R49.0 DYSPHONIA: Primary | ICD-10-CM

## 2023-01-24 DIAGNOSIS — J38.7 IRRITABLE LARYNX: ICD-10-CM

## 2023-01-24 PROCEDURE — 99214 OFFICE O/P EST MOD 30 MIN: CPT | Mod: 25 | Performed by: OTOLARYNGOLOGY

## 2023-01-24 PROCEDURE — 31579 LARYNGOSCOPY TELESCOPIC: CPT | Performed by: OTOLARYNGOLOGY

## 2023-01-24 PROCEDURE — 92524 BEHAVRAL QUALIT ANALYS VOICE: CPT | Mod: GN | Performed by: SPEECH-LANGUAGE PATHOLOGIST

## 2023-01-24 ASSESSMENT — PAIN SCALES - GENERAL: PAINLEVEL: NO PAIN (0)

## 2023-01-24 NOTE — PATIENT INSTRUCTIONS
Sherita Domingo M.M. (voice), M.A., CCC/SLP  Speech-Language Pathologist  [facility] Trained Vocologist  [facility]  [email or web address] for fastest response  she/her  [email or web address]    You have been referred for functional voice therapy    Why?    Therapy is very useful in treating all voice disorders, regardless of the type of disorder.  If you have:    Muscle tension dysphonia: Your voice disorder is caused by abnormal use of the muscles of the vocal mechanism, and functional voice therapy will teach your muscles how to work properly.  A growth on your vocal folds (such as nodules, polyps, or cysts): Your lesion may be caused by inappropriate use of the muscles, and therefore can only be cured by learning techniques for better muscle use.  Or, your lesion may cause the muscles to be used incorrectly, and relearning better technique is an important part of overall treatment.  If your lesion needs to be surgically removed, learning to use good muscle technique helps ensure an optimal surgical result.  A vocal fold paralysis: Learning to use your muscles to compensate can be very helpful, and may even eliminate the need for surgery.  About muscle tension dysphonia    One of the most common voice disorders we treat is muscle tension dysphonia (MTD).  Dysphonia simply means that the sound of the voice is insufficient for its intended purpose. MTD, however, may also refer to a voice that sounds normal, but causes pain, discomfort, or fatigue to the voice user.  MTD is considered a functional disorder; that is, the muscles do not function properly, causing poor sound, discomfort, or a sensation of increased effort.    There are many possible reasons why use of the vocal mechanism becomes abnormal.  Some general causes are very common:  prolonged illness  prolonged overuse  prolonged underuse (such as after a surgery)  trauma, such as an injury, chemical exposure, or emotionally  traumatic event    These can lead to an abnormal muscle response, causing a person to use more effort while talking.  Moreover, the pushing and squeezing can be very subtle, making the individual unaware of the extra effort.  The result may or may not be a stronger voice, but it usually starts a vicious cycle where more and more effort is required.  This cycle can continue for months or even years before the individual becomes aware that his or her voice is abnormal.  Usually, the only treatment available for muscle tension dysphonia is functional therapy.      Basics of functional voice therapy    There are some general things you should know about functional voice therapy.  Functional voice therapy . . .  is also known as voice therapy or behavioral voice therapy.  should only be done after a thorough evaluation by an ENT (ear, nose, and throat) physician.  should be done with a certified speech-language pathologist who specializes in voice disorders.  includes education about the use and care of the voice and how the voice works.  uses progressive exercises taught over a series of sessions.  varies in length from several sessions to many sessions over a few months, but some relief in symptoms is almost always gained within the first 4-6 sessions.  may, in the case of emotional stress, need to be accompanied by counseling or stress management.  Functional voice therapy at the [facility]    At the [facility], your functional therapy is done under the direction of Dr. SG Thomas or Sherita Domingo.  After a voice evaluation, a treatment plan is discussed with you, and therapy sessions are scheduled.  The plan for therapy varies from person to person, but in general, sessions occur weekly for one hour at first.  Sessions gradually become more spaced apart as you learn more advanced techniques.  After a few sessions, you may need more time to practice and incorporate your techniques into everyday  speech.    The first few sessions generally include a thorough discussion of your vocal lifestyle - voice needs, activities, and habits.  We will teach you how to keep the voice healthy regardless of the level of voice activity.  You will also learn pertinent information about how the voice works, helping you understand the therapeutic process better.  Then, exercises are taught to keep the upper body relaxed while using the voice, and to ensure optimal breathing technique.    At this point, specific voice exercises are taught.  Certain sounds affirm that the muscles are used correctly.  You will learn exercises to achieve these sounds first.  Once these sounds are mastered, you will advance to words, sentences, and finally conversational speech.  During your therapy sessions, exercises will be tailored to your vocal strengths and weaknesses.  During therapy, you will probably find it helpful to record your therapy session on compact disc.  Recording the exercises makes it easier to practice at home.  We encourage you to bring a CD with you to therapy.    Health insurance coverage for functional voice therapy    A normal sounding voice, free from discomfort or fatigue, is considered a normal function.  If it is disordered, restoring it is considered medically necessary.  Most insurance companies recognize this, and therapy is covered under most policies.    Functional voice therapy is considered a form of speech therapy.  If your insurance policy covers rehabilitation services such as physical therapy and speech therapy, therapy for a voice disorder should be covered.  If you have any questions about coverage, or problems with coverage for your voice treatment, please talk to Dr. Thomas or Ms. Domingo.  They can offer you strategies for getting them resolved.    For more information on this topic, visit our web site at [email or web address]          Muscle Tension Dysphonia    One of the most common voice  disorders we treat at the [facility] is muscle tension dysphonia (MTD).  The root word phon means  sound .  Phonation refers to the sound made by the voice.  The term dysphonia means there is something wrong with the voice.  However, muscle tension dysphonia can also refer to a voice that sounds normal but causes pain, discomfort, or fatigue to the voice user.  MTD is known as a functional disorder; that is, there is nothing structurally wrong with the voice.  Rather, the muscles do not function properly, which causes poor sound, discomfort, or increased effort.    Symptoms of MTD    Different individuals may have very different symptoms of MTD.  Possible voice characteristics include     rough, hoarse, gravelly, raspy     weak, breathy, airy, leaky, backward, hollow     strained, pressed, squeezed, tight, tense, choked, effortful     jerky, shaky, halting,     suddenly cutting out, squeezing shut, breaking off, changing pitch, or fading away     giving out gradually, or becoming weaker or more tense as voice use continues     excessively high or low pitch     inability to produce a loud or clear voice     inability to sing notes that used to be easy    Possible sensations of MTD include     pain or discomfort anywhere in the throat  area associated with voice use     a tight choking sensation with voice use     fatigue or effort that increases with voice use     some area of the neck becoming tender to the touch     feeling a need to clear the throat frequently     a feeling of a lump in the throat    Causes of Muscle Tension Dysphonia  There are many specific, individual reasons why use of the vocal mechanism becomes abnormal.  Some common causes are prolonged illness, prolonged voice overuse, prolonged voice underuse (such as after a surgery), and trauma, such as an injury, chemical exposure, or an emotionally traumatic event.  These lead to an abnormal vocal response, causing the individual to compensate  by using extra effort while talking.    The onset of MTD can be very subtle.  The individual is usually unaware of the extra effort, but this extra effort typically recruits muscles that are not part of the larynx (also known as the voice box).  The result may or may not be a stronger voice, but a vicious cycle usually starts in which more and more effort is required.  This cycle may continue for months or even years before the individual becomes aware that the voice is abnormal.    Treatment of MTD  Functional voice therapy is usually the only treatment available for MTD.  The amount of time required to complete functional therapy varies from only a few sessions to many months, but generally some relief is gained in the first 4 to 6 sessions.  In cases of emotional stress, counseling or stress management may be helpful or even necessary.    Occasionally, medical or surgical treatments may be tried.  Botox injections may be useful in severe cases.  Surgery has been tried in very few cases but is not done at the [facility].  Muscle relaxants are NOT useful for muscle tension dysphonia.  The action of these drugs is not localized to the vocal mechanism, so in order to provide enough relaxation for the vocal mechanism, the individual is often unable to function for day-to-day living.    Types of Muscle Tension Dysphonia  Muscle tension in the vocal mechanism can be exhibited in many ways.  Each individual is different, but a few common patterns are:     Anterior-posterior constriction     Hyperabduction     Hyperadduction     Pharyngeal constriction     Ventricular phonation     Vocal fold bowing (explained in another brochure)     back           r  i  g  h  t       front   A cross-section of the larynx (voice box) as seen from above.  This diagram may be helpful for visualizing the descriptions below.    Anterior-Posterior Constriction  In anterior-posterior constriction, the arytenoid cartilages bend forward  "during voice use, and/or the epiglottis bends backwards, causing the larynx to squeeze from front to back.  As effort increases, squeezing continues until the vocal folds (also called vocal cords) cannot be seen and may be unable to vibrate.  In extreme cases, especially in children, the arytenoids may actually vibrate against the epiglottis. The sound of the voice for someone with anterior-posterior constriction could range from normal to extremely squeezed and tight.  The voice may sound rough if the squeezing causes irregular vibration of the vocal folds.  Some experience a \"froggy\" sound if the arytenoids and epiglottis vibrate.  Someone with anterior-posterior constriction may complain of discomfort, increasing pain during voice use that may remain during rest, or fatigue and decline of voice quality as the voice is used more and more.    Hyperabduction  Abduction is the term for the vocal folds coming apart during breathing.  When a person has hyperabduction, the vocal folds do not sufficiently come together to produce voice.  They may appear to be pulled apart as the person phonates.  An emotional component may be present with this type of MTD.    A person with hyperabduction may have a weak, breathy, airy, very soft, or hollow voice, sometimes with breaks in phonation.  He or she may experience effort and fatigue.  Often times, functional therapy is combined with psychotherapy to treat hyperabduction.  In very severe cases, Botox injections are also a useful treatment.    Hyperadduction  In hyperadduction, the vocal folds adduct (come together) excessively tightly, restricting airflow during phonation.  The larynx may look normal in an exam, but the sound and sensation are not.    The sound of a voice with hyperadduction ranges from normal to extremely tight, pressed, squeezed, strangled, forced or effortful.  The muscle tension may be irregular, causing a stopping/starting or shaking effect.  A person with " hyperadduction may experience pain, discomfort, and effort and fatigue, usually increasing with continued voice use.  In very severe cases, Botox injections are helpful    Pharyngeal Constriction  During pharyngeal constriction, muscles of the pharynx (throat) contract excessively during voice use, making the throat very constricted.  The sound of the voice ranges from normal to very tight or squeezed.  It may be tremulous or throaty sounding.  A person with pharyngeal constriction may experience discomfort, pain, fatigue, or declining voice quality with use.  Pain usually increases with voice use but may remain during rest.  Sometimes emotional stress can provoke pharyngeal constriction.    Ventricular Phonation  This type of MTD is also called plica ventricularis, ventricular dysphonia, or false cord phonation.  The ventricular folds come together and vibrate instead of, or along with, the vocal folds.  The ventricular folds, also known as the false vocal cords, are mounds of fleshy tissue just above the vocal folds.  Though the ventricular folds are not muscular, they can be brought together and vibrated.  However, they were not meant to vibrate, so they cannot produce high pitches or loud sounds.  Pressure from the ventricular folds is usually strong enough to keep the true vocal folds from vibrating.  Most often, ventricular phonation is caused by continued voice use while true vocal folds are impaired, though sometimes extreme strain in response to a trauma is the cause.    Ventricular phonation sounds very rough and strained, sometimes inhuman, limited in pitch and volume.  One who uses ventricular phonation may experience fatigue (especially with attempts at loud voice use), pain or dryness with phonation.  However, sometimes no discomfort will be sensed at all.  In extreme cases, medical or surgical treatments may be tried to treat ventricular phonation, but only after functional therapy has failed.  In  some cases, ventricular phonation is the best alternative for persons whose true vocal folds will always be too impaired to vibrate.      .Paradoxical Vocal Fold Motion (PVFM)  General information on Paradoxical Vocal Fold Motion (PVFM)    PVFM is a disorder called by several other names.  These include:    vocal cord dysfunction (VCD)  paradoxical vocal cord motion (PVCM)  laryngeal dyskinesia  stridor  laryngospasm    Some of the common symptoms of PVFM are:    sudden inability to inhale  a sensation of shortness of breath, choking, strangling  gasping sound when inhaling      Who is affected by PVFM    PVFM is commonly seen in young athletes who develop symptoms during athletic exertion.  These athletes may be incorrectly diagnosed with exercise-induced asthma.  The disorder is also common in adults. Symptoms often cause awakening in the middle of the night, but they may occur at any time.  These symptoms may be triggered by eating or drinking, especially something sour.  Causes    If your symptoms occur during the night, the cause is very likely related to GERD (Gastroesophageal Reflux Disorder).  In this case, acid from the stomach refluxes back up the esophagus and spills over onto the larynx, irritating the vocal folds and causing them to twinge or spasm.  This feels like a choking sensation.  You may find that if you attempt to inhale forcefully, the vocal folds are actually sucked together, preventing inhalation.  This causes more forceful inhalation, and it creates a vicious cycle that can be very frightening.  The vicious cycle is such that the vocal folds may relax slightly during exhalation, but come together during inhalation.  Thus, the disorder is called Paradoxical Vocal Fold Motion because of this backwards movement of the vocal folds.  The gasping sound on inhalation is known as inhalatory stridor.    The scenario is similar if you are a young athlete.  You begin panting and gasping for air,  trying to inhale more forcefully, and the vocal folds are sucked together.  The same frightening cycle ensues.    In some cases, an individual with PVFM is able to gain control of his or her own breathing, and the episode ends.  In other cases, he or she needs to be seen in an emergency room.  In either case, the experience can be so alarming that if the conditions present themselves again, the same set of responses is triggered by the anxiety.  Moreover, it is generally not helpful to suggest to an individual who cannot breathe that they should  just relax.   Treatment    Treatment for reflux is an important component of treating most cases of PVFM.  This usually involves prescribing anti-reflux medications to reduce the irritation.  Also, you may be counseled about dietary precautions to alleviate GERD.  Relief may occur in days or may take a month more.    In [facility], breathing training is given so that the individual knows how to reverse the paradoxical motion of the vocal folds and breathe easily again.  Most often the breathing training includes flexible endoscopy.  This enables you to observe the motion of the vocal folds while doing the various maneuvers learned in training.  Watching yourself control your own breathing is generally very reassuring, and even if the laryngospasms continue for a time, the full-blown paradoxical episodes usually end within a few weeks.    In the case of an athlete, particularly a young athlete, care is taken to differentiate between a pulmonological (lung) problem, such as asthma, and PVFM.  Evaluation is done jointly with the Department of Pulmonology or Pediatric Pulmonology.  Often, we use a treadmill to simulate athletic exertion, and then, an endoscopic exam is done immediately after to confirm the occurrence of PVFM during exertion.  Breathing training is done as previously described and may include work on the treadmill to train special breathing  techniques for athletic exertion.  Because of the anxiety associated with this disorder, the young person and his or her parents are counseled carefully and sensitively.    For more information on this topic, visit our web site at [email or web address]

## 2023-01-24 NOTE — PROGRESS NOTES
"[facility]  [facility] - Dept. of Otolaryngology   Evaluation report - IN PERSON APPOINTMENT    Clinician: Sherita Domingo M.M. (voice), M.A., CCC-SLP  Seen in conjunction with: Dr. Queen \"Sheyogesh\" Ced  Referring physician:  Sylvia  Patient: Kitty Bangura  Date of Visit: 1/24/2023    HISTORY    Chief complaint: Kitty Bangura is a 62 year old presenting today for evaluation of voice and throat concerns.      Salient history: She has a history significant for oral lichen planus and neuropathic facial pain.     We were joined by: With her  Andrew    CURRENT SYMPTOMS INCLUDE  Hx of oral lichten planus  Referred from Dr. Ward      Poor voice quality    Increased effort to talk/sing    Throat irritation and tightness    Dry throat    Lump in the throat    Mucus    Frequent TC and cough    Heartburn     Uses nasal sprays.     VOICE:     Difficulty being heard, unpredictable clarity, people ask what's wrong with her voice    Does not talk as much - feels like a \"10 ppd smoker\", and throat gets even worse with extended speech    THROAT ISSUES:     Occasional throat clearing and coughing    Throat clearing and swallowing - ok, but getting lower, it gets stuck in the digestive tract    Feels like stuff is always present and she cannot clear it out.    TC and cough li not seem associated with food and liquid intake.    SWALLOWING:     Trouble with pills unless she turns her body a certain way    Swallow study on 12/9/22 demonstrated a safe functional swallow    The barium tablet did get hung up in the mid esophagus and was cleared with multiple sips of thin liquid.  She has noted solid food will stick in this area.     RESPIRATION:     No issues    ADDITIONAL    Reflux:     she is not sure if she has reflux symptoms, but does feel \"things getting backed up\"    She has not yet been evaluated by GI    Ms. Bangura was seen by Dr. Ward and Ms. Nikki Myers, SLP, last week, and GI evaluation was " recommended at that time.    Was getting mouth ulcers 2.5-3 years ago - clobetasol helped with her mouth sores. They never go away, and they can be quite large on her tongue.      Feels tenderness and discomfort with BOT muscles - L> R    Honey lemon tea - hydration     Nasal spray - runny nose - but it gives her a bad bloody nose. flonase generic.    OTHER PERTINENT HISTORY    Complex medical history: please also refer to Dr. Childress's dictation.     New Meds for LP about 1.5 years ago    Only has a small piece of her pancreas left.    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 A few calcifications are noted along the lateral margin of the mass.     Pneumonia     2016     PONV (postoperative nausea and vomiting)      Past Surgical History:   Procedure Laterality Date     ABDOMEN SURGERY  1983    Ruptured tubal pregnancies, additional surgeries followed     BACK SURGERY  2004    L5, S1 discectomy     BREAST SURGERY  2003    Breast reduction     COLONOSCOPY  2008     COLONOSCOPY N/A 12/11/2018    Procedure: colonoscopy;  Surgeon: Lobito Marte MD;  Location: [facility]     DAVINCI LOBECTOMY LUNG Left 7/28/2021    Procedure: Robot-assisted thoracoscopic left lower lobe wedge resection;  Surgeon: René Phillips MD;  [facility]     ENDOSCOPIC RETROGRADE CHOLANGIOPANCREATOGRAM N/A 1/25/2018    Procedure: COMBINED ENDOSCOPIC RETROGRADE CHOLANGIOPANCREATOGRAPHY, PLACE TUBE/STENT;  Endoscopic retrograde cholangiopancreatogram with stent placement and cyst drainage bile ;  Surgeon: Vito Sanches  MD Amauri;  [facility]     ENDOSCOPIC ULTRASOUND LOWER GASTROINTESTIONAL TRACT (GI) N/A 2018    Procedure: ENDOSCOPIC ULTRASOUND LOWER GASTROINTESTIONAL TRACT (GI);  Endoscopic Ultrasound with guided Cyst-Gastrostomy;  Surgeon: González eMtz MD;  [facility]     ENDOSCOPIC ULTRASOUND, ESOPHAGOSCOPY, GASTROSCOPY, DUODENOSCOPY (EGD), NECROSECTOMY N/A 2017    Procedure: ENDOSCOPIC ULTRASOUND, ESOPHAGOSCOPY, GASTROSCOPY, DUODENOSCOPY (EGD), NECROSECTOMY;  Esophagogastroduodenoscopy, with  Necrosectomy and stents replacement  ;  Surgeon: González Metz MD;  [facility]     ENDOSCOPIC ULTRASOUND, ESOPHAGOSCOPY, GASTROSCOPY, DUODENOSCOPY (EGD), NECROSECTOMY N/A 2017    Procedure: ENDOSCOPIC ULTRASOUND, ESOPHAGOSCOPY, GASTROSCOPY, DUODENOSCOPY (EGD), NECROSECTOMY;  Esophagogastroduodenoscopy with Necrosectomy, Balloon Dilation, stent removal X3 and Cystgastrostomy stent Placement X2;  Surgeon: Guru Cecilia Staples MD;  [facility]     ESOPHAGOSCOPY, GASTROSCOPY, DUODENOSCOPY (EGD), COMBINED N/A 2017    Procedure: COMBINED ENDOSCOPIC ULTRASOUND, ESOPHAGOSCOPY, GASTROSCOPY, DUODENOSCOPY (EGD), FINE NEEDLE ASPIRATE/BIOPSY;  Endoscopic Ultrasound with cystgastrostomy and fine needle aspiration ;  Surgeon: González Metz MD;  [facility]     ESOPHAGOSCOPY, GASTROSCOPY, DUODENOSCOPY (EGD), COMBINED N/A 2018    Procedure: COMBINED ESOPHAGOSCOPY, GASTROSCOPY, DUODENOSCOPY (EGD);  EGD;  Surgeon: González Metz MD;  [facility]     ESOPHAGOSCOPY, GASTROSCOPY, DUODENOSCOPY (EGD), COMBINED N/A 2018    Procedure: COMBINED ESOPHAGOSCOPY, GASTROSCOPY, DUODENOSCOPY (EGD), REMOVE FOREIGN BODY;;  Surgeon: González Metz MD;  [facility]     GYN SURGERY  1983    Multiple ectopic pregnancies, reconnect,  1988     INSERT PORT VASCULAR ACCESS Right 2018    Procedure: INSERT PORT VASCULAR ACCESS;  Chest Port Placement -  "right;  Surgeon: Chanda Lawson PA-C;  [facility]     IR PORT REMOVAL RIGHT  2019     LAPAROSCOPY DIAGNOSTIC (GENERAL) N/A 2018    Procedure: LAPAROSCOPY DIAGNOSTIC (GENERAL);  Diagnostic Laparoscopy; Open Distal Pancreatectomy And Splenectomy, splenic flexure mobilization, cholecystectomy, intraoperative ultrasound;  Surgeon: Ja Byrd MD;  [facility]     MAMMOPLASTY REDUCTION Bilateral 2006     PANCREATECTOMY, SPLENECTOMY N/A 2018    Procedure: PANCREATECTOMY, SPLENECTOMY;;  Surgeon: Ja Byrd MD;  [facility]     CT LAMNOTMY INCL W/DCMPRSN NRV ROOT 1 INTRSPC LUMBR      Description: Hemilaminectomy With Disc Removal One Lumbar Interspace;  Recorded: 2008;  Comments: Right L5-S1 with resultant loss of right patellar and achilles reflex     REMOVE PORT VASCULAR ACCESS Right 2019    Procedure: Right Port Removal;  Surgeon: Juan J Donaldson PA-C;  [facility]     ZZC  DELIVERY ONLY      Description:  Section;  Recorded: 12/10/2009;       OBJECTIVE  PATIENT REPORTED MEASURES  Patient Supplied Answers To VHI Questionnaire  Voice Handicap Index (VHI-10) 2023   My voice makes it difficult for people to hear me 2   People have difficulty understanding me in a noisy room 2   My voice difficulties restrict my personal and social life.  2   I feel left out of conversations because of my voice 3   My voice problem causes me to lose income 0   I feel as though I have to strain to produce voice 2   The clarity of my voice is unpredictable 3   My voice problem upsets me 2   My voice makes me feel handicapped 2   People ask, \"What's wrong with your voice?\" 3   VHI-10 21       Patient Supplied Answers To CSI Questionnaire  Cough Severity Index (CSI) 2023   My cough is worse when I lie down 2   My coughing problem causes me to restrict my personal and social life 2   I tend to avoid places because of my cough problem 1   I feel embarrassed because " of my coughing problem 2   People ask, ''What's wrong?'' because I cough a lot 4   I run out of air when I cough 2   My coughing problem affects my voice 3   My coughing problem limits my physical activity 2   My coughing problem upsets me 2   People ask me if I am sick because I cough a lot 3   CSI Score 23       Patient Supplied Answers To EAT Questionnaire  No flowsheet data found.    PERCEPTUAL EVALUATION (CPT 16662)  POSTURE / TENSION/ PALPATION OF THE LARYNGEAL AREA:     upper body    BREATHING:     appears within normal limits and adequate     LARYNGEAL PALPATION:     firm musculature    tenderness of the thyrohyoid area    reduced thyrohyoid space    VOICE:    Willem states that today is a typical voice today, with clinician observing voice quality to be characterized as:    Roughness: Mild to moderate    Breathiness: Mild to moderate    Strain: Mild to moderate    Pitch:    F0/ Habitual Pitch: WNL  o Resonance:     Conversational speech:  laryngeal pharyngeal resonance    Loudness    Conversational speech:  WNL    Projected speech:  N/a    Singing vs. Speech:  n/a    GLOBAL ASSESSMENT OF DYSPHONIA: 25/100    COUGH/THROAT CLEARING:    not observed today    LARYNGEAL FUNCTION STUDIES (CPT 39921)  n/a    LARYNGEAL EXAMINATION  Procedure: Flexible endoscopy with chip-tip technology with stroboscopy, right nostril; topical anesthesia with 3% Lidocaine and 0.25% phenylephrine was applied.   Performed by: Dr. Coughlin  The laryngeal and pharyngeal structures were evaluated for gross appearance, mobility, function, and focal lesions / abnormalities of the associated mucosa.    All findings were within normal limits with the exception of the following salient features:   This exam shows the following:    Posterior commissure: mild to moderate hypertrophy and the presence of redundant mucosa  Secretions: mild  Very tight TH and even tenderness - no cough    Movement:  Right Vocal Fold: Normal  Left Vocal Fold:  Normal  Hyperabduction present throughout the laryngeal exam    Glottic Closure: Normal  Upper Airway: Patent    Vocal Fold Findings:  Right Vocal Fold: mild atrophy  Left Vocal Fold: mild atrophy    Strobe - R ventricular medializes and dampens vibration, but right TVF appears to have reduced mucosal wave    The addition of stroboscopy allowed evaluation of the mucosal wave:    Amplitude: right: WNL; left: WNL    Symmetry: intermittent symmetry.    Closure pattern: incomplete.     Closure plane: at glottic level.     Phase distribution: normal.    THERAPY PROBES: Improvement was elicited with use of forward resonant stimuli, coordination of respiration and phonation and use of yawn sigh    The laryngeal exam was reviewed with Ms. Bangura, and I provided pertinent explanations, as well as written and oral information.    ASSESSMENT / PLAN  IMPRESSIONS: Kitty Bangura is a 62 year old, presenting today with Irritable Larynx Syndrome (ILS) (J38.7), Chronic Throat Clearing (R68.89), Laryngeal Hyperfunction (J38.7) and Dysphonia (R49.0), as evidenced by evaluation the results of the evaluation and the laryngeal exam.    Remarkable findings included:    Perceptual evaluation demonstrated:     Roughness: Mild to moderate    Breathiness: Mild to moderate    Strain: Mild to moderate    Pitch:    F0/ Habitual Pitch: WNL      Laryngeal exam demonstrated:   Posterior commissure: mild to moderate hypertrophy and the presence of redundant mucosa  Secretions: mild  Very tight TH and even tenderness - no cough      Primary complaint of patient today included: voice and throat concerns  Therefore, we recommended a course of speech therapy to address these concerns.    STIMULABILITY: results of therapy probes during perceptual and laryngeal evaluation demonstrate improvement with  use of forward resonant stimuli, coordination of respiration and phonation and use of yawn sigh    RECOMMENDATIONS:     A course of speech therapy is  recommended to optimize vocal technique, improve voice quality, promote reduced discomfort, effort and fatigue and help reduce throat clear and mucosal irritation.    ILS & MTD - Dr. Coughlin recommending therapy    She demonstrates a Good prognosis for improvement given adherence to therapeutic recommendations.     Positive indicators: positive response to therapy probes diagnosis is known to respond to treatment high level of comittment    Negative indicators: none    Research: This patient is willing to be contacted about participation in research. May be eligible for MTD and cough study.      DURATION / FREQUENCY: 4 one-hour sessions.  A total of 6-8 sessions may be necessary.    GOALS:  Patient goal:   To improve and maintain a healthy voice quality  To understand the problem and fix it as much as possible  To have a normal and acceptable voice quality  To reduce her cough to acceptable levels    Short-term goal(s): Within the first 4 sessions, Ms. Bangura:  1. will be able to demonstrate provided cough suppression and substitution strategies from memory independently with 90% accuracy  2. will be able to independently list key factors in maintenance of good vocal hygiene with 80% accuracy, and report on their use outside the therapy room.  3. will demonstrate semi-occluded vocal tract (SOVT) exercises with at least 80% accuracy with no clinician support    Long-term goal(s): In 6 months, Ms. Bangura will:  1. Report a week with no more than 2 episodes of coughing, that do not last more than 2 seconds  2. Report resolution of dysphonia, and use of optimal voice quality to meet personal, social, and professional needs, 90% of the time during a typical week of vocal activities    Certification period:   Certification date from: 1/24/23  Certification date to: 4/24/23    This treatment plan was developed with the patient who agreed with the recommendations.    TOTAL SERVICE TIME: 60 minutes  EVALUATION OF VOICE AND  RESONANCE (42030)  NO CHARGE FACILITY FEE (08342)    Sherita Domingo M.M. (voice), M.A., CCC/SLP  Speech-Language Pathologist  [place] Trained Vocologist  [facility]  [phone]  [email or web address]  Pronouns: she/her      *this report was created in part through the use of computerized dictation software, and though reviewed following completion, some typographic errors may persist.  If there is confusion regarding any of this notes contents, please contact me for clarification

## 2023-01-24 NOTE — PATIENT INSTRUCTIONS
1.  You were seen in the ENT Clinic today by . If you have any questions or concerns after your appointment, please call [phone]. Press option #1 for scheduling related needs. Press option #3 for Nurse advice.    2.   has recommended the following:   - speech therapy   - GI consult to evaluate esophageal stricture     3.  Plan is to return to clinic as needed      Zuri Mendoza LPN  [phone]  [facility] - Otolaryngology

## 2023-01-25 DIAGNOSIS — G89.18 ACUTE POST-OPERATIVE PAIN: ICD-10-CM

## 2023-01-25 RX ORDER — GABAPENTIN 100 MG/1
300 CAPSULE ORAL 3 TIMES DAILY
Qty: 270 CAPSULE | Refills: 3 | Status: SHIPPED | OUTPATIENT
Start: 2023-01-25 | End: 2023-12-11

## 2023-01-30 ENCOUNTER — VIRTUAL VISIT (OUTPATIENT)
Dept: OTOLARYNGOLOGY | Facility: CLINIC | Age: 63
End: 2023-01-30
Payer: COMMERCIAL

## 2023-01-30 DIAGNOSIS — J38.7 IRRITABLE LARYNX: ICD-10-CM

## 2023-01-30 DIAGNOSIS — R49.0 DYSPHONIA: Primary | ICD-10-CM

## 2023-01-30 DIAGNOSIS — R09.89 CHRONIC THROAT CLEARING: ICD-10-CM

## 2023-01-30 PROCEDURE — 92507 TX SP LANG VOICE COMM INDIV: CPT | Mod: 95 | Performed by: SPEECH-LANGUAGE PATHOLOGIST

## 2023-01-30 NOTE — PATIENT INSTRUCTIONS
"After Visit Summary    Patient: Kitty Bangura  Date of Visit: 1/30/2023    These notes are also available in your MyChart. Please take a few moments to find them under \"Past Appointments\" in the [employer] system, as Sherita will start to phase out e-mail communications.    \"Handouts\" that go along with today's order of activities include (below):   Frequency of practice: 3-4 times per day.  Try not to spend more than 5 min at a time.    Order of today's appointment:     Cup and Bubble Exercises   WHY: Though these exercises seems (and feels) silly they are helpful for a number of reasons. First, they make you use your air generously and consistently, helping you to coordinate your breath and your voice. Second, they lengthen and narrow the vocal tract with the straw. This narrowing (or semi-occlusion in scientific terms) creates back pressure in your throat which has been shown to help the vocal folds vibrate more easily and reduce how hard some of the other muscles are squeezing.   HOW:   With Water - Fill the cup (or bottle) about 2 inches full of water. Blow bubbles through the straw while keeping your voice on. (kind of like making an \"ooooo\" sound through straw).Keep the water bubbling the whole time. That means your air is moving consistently.   Without Water - make sound through the straw (like it's a kazoo). Make sure you are using your air freely. You can hold your hand in front of the straw to test, and you should be able to feel the air moving.   Use the \"w\" sound without the straw. Let your cheeks puff up slightly (like Dontae Campos). Maintain the relaxed feeling in your throat while focusing on a sense of buzz at your lips.   After easy sounds listed below speak through the straw (with or without water) using every day phrases and counting to help bridge between the exercises and everyday voice use.   Feel how open and relaxed your throat feels when you practice these sounds. If the bubbling or air " stops or it gets tight, don't sweat it. Just breath, relax, and start again. Across all the exercises make sure you are getting a nice low breath and feeling the steady inward motion of low abdominal muscles when making sound.   Practice 5 times a day for no more than 3 minutes, and whenever you feel fatigued.   Aren't sure if you are doing it right? Ask yourself these three questions:   1. Does it feel easy?   2. Are the bubbles and voice consistent?   3. Do you feel that forward buzz?     What sounds to make:   Single pitches - use any comfortable pitch and sustain the note for as long as it feels free and easy, and your lips or tongue are bubbling.   Sighs - glide from high to low like you are sitting down in a comfortable chair after a long day of work   [place] - Start on a medium pitch and glide up just a bit and back down   On and off - use a comfortable pitch near where you speak. Keep the bubbles moving consistently while turning the voice on and off. Recognize how little work you need to do to get the voice working.            Bret Coughlin Jr., M.SINGH Parsons M.D.     Dee Dee Childress M.D.       Franci Thomas, Ph.D.  Sherita Domingo M.M., M.A.                 Fadi Kerr M.M., M.A.           ELHAM Guadarrama.M. M.S.         [phone]       4 Sherita Domingo    Tips to Improve Topical Hydration and Reduce Laryngeal Irritation    Nasal Irrigation:  morning and night    times a day  Saline nasal spray ___________ per day  Saline gel  Gargle:  Using Saline    morning and night     times a day   With plain water (room temp or warm)  Throughout the day (2-3x/day, natalya. after meals, or when having increased throat irritation symptoms).  To help reduce throat irritation, the feeling of mucus in the throat, improve topical hydration and to reduce throat clearing, coughing.  Saline Recipe: For garglin-8 oz glass   warm water   tap or distilled (your preference)  feel for right  temperature (not too hot or cold)  warm enough to dissolve the salt  1/4 tsp. kosher salt, or sea salt   Additives in table salt can cause irritation/ discomfort.  1/4 tsp. baking soda  (OR alternate salt and soda with one saline packet) - Neilmed is a common brand found in the sinus aisle.        Nasal Dryness:  For light congestion, post-nasal drainage, dryness in the nasal passages, try a saline nasal spray (very cheap - any brand).  Saline nasal gel, like Ayr, or NasoGEL are also very helpful. [place] can be found at [employer], but there are many similar products to be found in the allergy/nasal aisle of any drug or retail store.  Saline gels also help with colds/flu, allergies, winter dryness, low humidity, CPAP/BiPaP, chronic Sinusitis, flying, nosebleeds (a.k.a.: epistaxis), and oxygen therapy.    Lozenges:                     Non-Menthol drops are recommended since menthol is        drying, which includes (regular or sugar-free):          Ludens, Mary Brenery, [employer], Life Savers, [place]                                           Ranchers.  Most Ricola drops have menthol (check the                                          package before you buy). Everything in moderation -        maintain good oral hygiene and avoid cavities.  [place] (xlear.com) and other xylitol products are also recommended.    Humidifiers:   I recommend Carrera or Gómez, which tend to be quieter than other brands.  Use especially while sleeping, for improved nasal/ oral topical hydration, and improved sleep.  Please consider purchasing one at the end of the season (March/April), if you cannot afford one now.      Consider the humidifier for use especially at night time when there is minimal systemic hydration.  Fill with enough water for 1-2 nights; clean base and water container at least once per week with a water/vinegar mixture.  Always follow instructions as directed by .        Steam:   Facial steamers/ warm, moist  washcloth ______x/day for ________minutes.        Sherita Domingo M.M. (voice), M.A., CCC/SLP  Speech-Language Pathologist  [facility] Certified Vocologist  [facility]  [email or web address]  she/her

## 2023-01-30 NOTE — PROGRESS NOTES
"Kitty Bangura is a 62 year old female who is being cared for via a billable virtual visit.        The patient has been notified and verbally consented to the following statements:     This video visit will be conducted between you and your provider.    If during the course of the call the provider feels a video visit is not appropriate, you will not be charged for this service.    Provider has received verbal consent for billable virtual visit from the patient? Yes    Preferred method for receiving information: [employer]     Call initiated at: 9:56 AM  Platform used to conduct today's virtual appointment: AM Well Video  Location of provider: Residence  Location of patient: [facility]ICE [facility]INIC  THERAPY NOTE (CPT 16080)  Patient: Kitty Bangura  Date of Service: 1/30/2023  Referring physician: Dr. Queen \"Shep\" Ced  Impressions from most recent evaluation (1/24/23):  \"IMPRESSIONS: Kitty Bangura is a 62 year old, presenting today with Irritable Larynx Syndrome (ILS) (J38.7), Chronic Throat Clearing (R68.89), Laryngeal Hyperfunction (J38.7) and Dysphonia (R49.0), as evidenced by evaluation the results of the evaluation and the laryngeal exam.    Remarkable findings included:    Perceptual evaluation demonstrated:   ? Roughness: Mild to moderate  ? Breathiness: Mild to moderate  ? Strain: Mild to moderate  ? Pitch:  ? F0/ Habitual Pitch: WNL       Laryngeal exam demonstrated:   Posterior commissure: mild to moderate hypertrophy and the presence of redundant mucosa  Secretions: mild  Very tight TH and even tenderness - no cough       Primary complaint of patient today included: voice and throat concerns  Therefore, we recommended a course of speech therapy to address these concerns.\"    SUBJECTIVE:  Since the last appointment, Ms. Bangura reports the following:     Overall she reports that symptoms are stable; this is the initial appointment.    OBJECTIVE:  Ms. Bangura presents today with the " "following:  Voice quality:  ? Roughness: Mild to moderate  ? Breathiness: Mild to moderate  ? Strain: Mild to moderate  ? Pitch:  ? F0/ Habitual Pitch: WNL  THROAT ISSUES    Occasional dry throat clear    PATIENT REPORTED MEASURES:  Patient Supplied Answers To SLP QOL Questionnaire  No flowsheet data found.  Speech follow up as discussed with patient:  No flowsheet data found.    THERAPEUTIC ACTIVITIES  Exercises and techniques for optimal vocal hygiene including:    Systemic hydration, including strategies for increasing daily water intake    Topical hydration - Gargling (saline and plain water), saline nasal irrigation, humidification, steam, guaifenesin to reduce the thickened secretions / laryngeal irritation.    Semi-Occluded Vocal Tract (SOVT) exercises instructed to reduce laryngeal tension, promote vocal fold pliability, and coordinate respiration and phonation    Straw phonation with water resistance was found to be most facilitating     Sustained phonation, and voice vs. voiceless productions used to promote easy voicing and raise awareness of laryngeal tension    Ascending and descending glides utilized to promote vocal fold pliability    \"Messa di voce\", gradual crescendo and decrescendo to vary medial compression was also utilized to promote vocal fold pliability.    Instructed on the benefits of using these exercises for improved coordination of breath flow with phonation and tissue mobilization.    Instructed on the importance of using these exercises as a warm-up / cool down,  and to re-calibrate the voice throughout the day.    Chronic cough / throat clearing reduction therapy    Suppression and substitution strategies were instructed including    Swallowing substitution techniques    Breathing suppression techniques to reduce laryngeal tension    Low impact glottic coup and soft cough    Techniques to raise awareness of habitual throat clearing    Counseling and Education:    Asked many questions " about the nature of her symptoms, and I answered all of these thoroughly.    A revised regimen for home practice was instructed.    I provided an AVS of today's therapeutic activities to facilitate practice.    ASSESSMENT/PLAN  PROGRESS TOWARD LONG TERM GOALS:   Adequate progress; too early for objective measures    IMPRESSIONS: Irritable Larynx Syndrome (ILS) (J38.7), Chronic Throat Clearing (R68.89), Laryngeal Hyperfunction (J38.7) and Dysphonia (R49.0). Ms. Bangura demonstrated good learning today of therapeutic activities provided to help reduce laryngeal irritation, coughing and throat clearing.  She will continue to practice until her next appointment.     PLAN: I will see Ms. Bangura in May, unless a sooner appt is available  For practice goals see AVS.     Next Clinic Appt: May    Certification period:   Certification date from: 1/24/23  Certification date to: 4/24/23    TOTAL SERVICE TIME:   Call Initiated at: 9:56 AM  Call Ended at: 11am           CPT Billing Codes:   TREATMENT (74095)  NO CHARGE FACILITY FEE (21260)    Sherita Domingo M.M. (voice) MYudyA., CCC/SLP  Speech-Language Pathologist  [facility] Trained Vocologist  [facility]  [phone]  [email or web address]  Pronouns: she/her      *this report was created in part through the use of computerized dictation software, and though reviewed following completion, some typographic errors may persist.  If there is confusion regarding any of this notes contents, please contact me for clarification

## 2023-01-30 NOTE — LETTER
"1/30/2023       RE: Kitty Bangura  [address] [place] [id number]     Dear Colleague,    Thank you for referring your patient, Kitty Bangura, to the [facility] [place] [facility]. Please see a copy of my visit note below.    Kitty Bangura is a 62 year old female who is being cared for via a billable virtual visit.        The patient has been notified and verbally consented to the following statements:     This video visit will be conducted between you and your provider.    If during the course of the call the provider feels a video visit is not appropriate, you will not be charged for this service.    Provider has received verbal consent for billable virtual visit from the patient? Yes    Preferred method for receiving information: [employer]     Call initiated at: 9:56 AM  Platform used to conduct today's virtual appointment: AM Well Video  Location of provider: Residence  Location of patient: [facility] VOICE [facility]  THERAPY NOTE (CPT 34606)  Patient: Kitty Bangura  Date of Service: 1/30/2023  Referring physician: Dr. Queen \"Shep\" Yaeling  Impressions from most recent evaluation (1/24/23):  \"IMPRESSIONS: Kitty Bangura is a 62 year old, presenting today with Irritable Larynx Syndrome (ILS) (J38.7), Chronic Throat Clearing (R68.89), Laryngeal Hyperfunction (J38.7) and Dysphonia (R49.0), as evidenced by evaluation the results of the evaluation and the laryngeal exam.    Remarkable findings included:    Perceptual evaluation demonstrated:   ? Roughness: Mild to moderate  ? Breathiness: Mild to moderate  ? Strain: Mild to moderate  ? Pitch:  ? F0/ Habitual Pitch: WNL       Laryngeal exam demonstrated:   Posterior commissure: mild to moderate hypertrophy and the presence of redundant mucosa  Secretions: mild  Very tight TH and even tenderness - no cough       Primary complaint of patient today included: voice and throat " "concerns  Therefore, we recommended a course of speech therapy to address these concerns.\"    SUBJECTIVE:  Since the last appointment, Ms. Bangura reports the following:     Overall she reports that symptoms are stable; this is the initial appointment.    OBJECTIVE:  Ms. Bangura presents today with the following:  Voice quality:  ? Roughness: Mild to moderate  ? Breathiness: Mild to moderate  ? Strain: Mild to moderate  ? Pitch:  ? F0/ Habitual Pitch: WNL  THROAT ISSUES    Occasional dry throat clear    PATIENT REPORTED MEASURES:  Patient Supplied Answers To SLP QOL Questionnaire  No flowsheet data found.  Speech follow up as discussed with patient:  No flowsheet data found.    THERAPEUTIC ACTIVITIES  Exercises and techniques for optimal vocal hygiene including:    Systemic hydration, including strategies for increasing daily water intake    Topical hydration - Gargling (saline and plain water), saline nasal irrigation, humidification, steam, guaifenesin to reduce the thickened secretions / laryngeal irritation.    Semi-Occluded Vocal Tract (SOVT) exercises instructed to reduce laryngeal tension, promote vocal fold pliability, and coordinate respiration and phonation    Straw phonation with water resistance was found to be most facilitating     Sustained phonation, and voice vs. voiceless productions used to promote easy voicing and raise awareness of laryngeal tension    Ascending and descending glides utilized to promote vocal fold pliability    \"Messa di voce\", gradual crescendo and decrescendo to vary medial compression was also utilized to promote vocal fold pliability.    Instructed on the benefits of using these exercises for improved coordination of breath flow with phonation and tissue mobilization.    Instructed on the importance of using these exercises as a warm-up / cool down,  and to re-calibrate the voice throughout the day.    Chronic cough / throat clearing reduction therapy    Suppression and " substitution strategies were instructed including    Swallowing substitution techniques    Breathing suppression techniques to reduce laryngeal tension    Low impact glottic coup and soft cough    Techniques to raise awareness of habitual throat clearing    Counseling and Education:    Asked many questions about the nature of her symptoms, and I answered all of these thoroughly.    A revised regimen for home practice was instructed.    I provided an AVS of today's therapeutic activities to facilitate practice.    ASSESSMENT/PLAN  PROGRESS TOWARD LONG TERM GOALS:   Adequate progress; too early for objective measures    IMPRESSIONS: Irritable Larynx Syndrome (ILS) (J38.7), Chronic Throat Clearing (R68.89), Laryngeal Hyperfunction (J38.7) and Dysphonia (R49.0). Ms. Bangura demonstrated good learning today of therapeutic activities provided to help reduce laryngeal irritation, coughing and throat clearing.  She will continue to practice until her next appointment.     PLAN: I will see Ms. Bangura in May, unless a sooner appt is available  For practice goals see AVS.     Next Clinic Appt: May    Certification period:   Certification date from: 1/24/23  Certification date to: 4/24/23    TOTAL SERVICE TIME:   Call Initiated at: 9:56 AM  Call Ended at: 11am           CPT Billing Codes:   TREATMENT (17254)  NO CHARGE FACILITY FEE (62363)    Sherita Domingo M.M. (voice) MKELSEY., CCC/SLP  Speech-Language Pathologist  [facility] Trained Vocologist  [facility]  [phone]  [email or web address]  Pronouns: she/her      *this report was created in part through the use of computerized dictation software, and though reviewed following completion, some typographic errors may persist.  If there is confusion regarding any of this notes contents, please contact me for clarification      Again, thank you for allowing me to participate in the care of your patient.      Sincerely,    Sherita Domingo, SLP

## 2023-02-02 NOTE — PROGRESS NOTES
Outpatient Speech Language Therapy Evaluation  PLAN OF TREATMENT FOR OUTPATIENT REHABILITATION  (COMPLETE FOR INITIAL CLAIMS ONLY)  Patient's Last Name, First Name, M.I.  YOB: 1960  Kitty Bangura                        Provider's Name  Sherita Domingo, SLP Medical Record No.  [id number]                               Onset Date:  1/24/23   Start of Care Date: 1/24/23     Type: Speech Language Therapy Medical Diagnosis: Dysphonia [R49.0]                        Therapy Diagnosis:  Dysphonia [R49.0]   Visits from SOC:  1   _________________________________________________________________________________  Plan of Treatment:   Speech therapy    DURATION/FREQUENCY OF TREATMENT  Six weekly, one-hour sessions, with two monthly one-hour follow-up sessions    PROGNOSIS  Good prognosis for voice improvement with speech therapy and regular practice of therapeutic activities.    BARRIERS TO LEARNING/TEACHING AND LEARNING NEEDS  None/Unremarkable    GOALS:  Patient goal:   To improve and maintain a healthy voice quality  To understand the problem and fix it as much as possible  To have a normal and acceptable voice quality  To reduce her cough to acceptable levels    Short-term goal(s): Within the first 4 sessions, Ms. Bangura:  1. will be able to demonstrate provided cough suppression and substitution strategies from memory independently with 90% accuracy  2. will be able to independently list key factors in maintenance of good vocal hygiene with 80% accuracy, and report on their use outside the therapy room.  3. will demonstrate semi-occluded vocal tract (SOVT) exercises with at least 80% accuracy with no clinician support    Long-term goal(s): In 6 months, Ms. Bangura will:  1. Report a week with no more than 2 episodes of coughing, that do not last more than 2 seconds  Report resolution of dysphonia, and use of optimal voice  quality to meet personal, social, and professional needs, 90% of the time during a typical week of vocal activities  _________________________________________________________________________________    I CERTIFY THE NEED FOR THESE SERVICES FURNISHED UNDER        THIS PLAN OF TREATMENT AND WHILE UNDER MY CARE     (Physician attestation of this document indicates review and certification of the therapy plan).     Certification date from: 1/24/23  Certification date to: 4/24/23    Referring Provider: Enrique Ward

## 2023-02-27 ENCOUNTER — VIRTUAL VISIT (OUTPATIENT)
Dept: OTOLARYNGOLOGY | Facility: CLINIC | Age: 63
End: 2023-02-27
Payer: COMMERCIAL

## 2023-02-27 DIAGNOSIS — R49.0 DYSPHONIA: Primary | ICD-10-CM

## 2023-02-27 DIAGNOSIS — R09.89 CHRONIC THROAT CLEARING: ICD-10-CM

## 2023-02-27 DIAGNOSIS — J38.7 IRRITABLE LARYNX: ICD-10-CM

## 2023-02-27 PROCEDURE — 92507 TX SP LANG VOICE COMM INDIV: CPT | Mod: GN | Performed by: SPEECH-LANGUAGE PATHOLOGIST

## 2023-02-27 NOTE — PROGRESS NOTES
"Kitty Bangura is a 62 year old female who is being cared for via a billable virtual visit.        The patient has been notified and verbally consented to the following statements:     This video visit will be conducted between you and your provider.    If during the course of the call the provider feels a video visit is not appropriate, you will not be charged for this service.    Provider has received verbal consent for billable virtual visit from the patient? Yes    Preferred method for receiving information: [employer]     Call initiated at: 1:59 PM  Platform used to conduct today's virtual appointment: AM Michael Video  Location of provider: Residence  Location of patient: [facility] VOICE CLINIC  THERAPY NOTE (CPT 37457)  Patient: Kitty Bangura  Date of Service: 2/27/2023  Referring physician: Dr. Queen \"Shep\" Ced  Impressions from most recent evaluation (1/24/23):  \"IMPRESSIONS: Kitty Bangura is a 62 year old, presenting today with Irritable Larynx Syndrome (ILS) (J38.7), Chronic Throat Clearing (R68.89), Laryngeal Hyperfunction (J38.7) and Dysphonia (R49.0), as evidenced by evaluation the results of the evaluation and the laryngeal exam.    Remarkable findings included:    Perceptual evaluation demonstrated:   ? Roughness: Mild to moderate  ? Breathiness: Mild to moderate  ? Strain: Mild to moderate  ? Pitch:  ? F0/ Habitual Pitch: WNL       Laryngeal exam demonstrated:   Posterior commissure: mild to moderate hypertrophy and the presence of redundant mucosa  Secretions: mild  Very tight TH and even tenderness - no cough       Primary complaint of patient today included: voice and throat concerns  Therefore, we recommended a course of speech therapy to address these concerns.\"       SUBJECTIVE:  Since the last appointment, Ms. Bangura reports the following:     Overall she reports that symptoms are stable    Drinking water    Has been drinking propel, and thinks that it helps improve " hydration.    Herbal tea is also helpful    gargling - after eating and with saline    Using saline spray and gel - helping because it is less dry.    Coughed more in the last few days.     Gets a cough more when exercising.     Throat clearing when talking more.     Less bubbles - still doing it 3x/day at minimum    Xylitol lozenges.     OBJECTIVE:  Ms. Bangura presents today with the following:  Voice quality:  ? Roughness: Mild to moderate  ? Breathiness: Mild to moderate  ? Strain: Mild to moderate  ? Pitch:  ? F0/ Habitual Pitch: WNL  THROAT ISSUES    Occasional dry throat clear      PATIENT REPORTED MEASURES:  Patient Supplied Answers To SLP QOL Questionnaire  No flowsheet data found.  Speech follow up as discussed with patient:  Dysponia SLP Goals 2/27/2023   How would you rate your speaking voice quality, if 0 is worst voice quality, and 10 is best voice? 6   How much effort is it to speak, if 0 is no extra effort and 10 is maximum effort? 2   How severe is your cough /throat clearing, if 0 is no cough at all and 10 is the worst cough? 4   How much does your voice problem bother you? Somewhat   How much does your cough/throat-clearing problem bother you?            A little bit     THERAPEUTIC ACTIVITIES    Demonstrated previous exercises.  o demonstrated improved technique  o appropriate redirection provided  o instruction provided for increased level of complexity/difficulty    Exercises to promote optimal respiratory mechanics    she demonstrated clavicular/neck/shoulder involvement in inhalation    Demonstrated difficulty allowing abdominal relaxation for inhalation    Practiced in a seated position, with tactile cue of a hand on the low rib-cage to facilitate awareness of low respiratory engagement.     With clinician support, patient was able to demonstrate improved abdominal relaxation and engagement on inhalation    Optimal exhalation using inward engagement of the abdominal wall with no  "corresponding collapse of the upper chest cavity was trained using the pulsed \"sh\" task    [place] a respiratory pacing exercise; this was helpful    acceptable improvement in airflow and respiratory mechanics    Exercises in techniques for improved airflow during phonation    Speech material with /ju/ glides was facilitating at the word level.    Progressed to easy onset/ flow, and blending phrases    Instructed with a descending 5th, as well as an arpeggio pattern; this was helpful.    [place] techniques to reduce glottal anderson and improve breath flow; negative practice improved awareness today.    Exercises and techniques for optimal vocal hygiene including:    Systemic hydration, including strategies for increasing daily water intake    Topical hydration - Gargling (saline and plain water), saline nasal irrigation, humidification, steam, guaifenesin to reduce the thickened secretions / laryngeal irritation.    Chronic cough / throat clearing reduction therapy    Suppression and substitution strategies were instructed including    Swallowing substitution techniques    Breathing suppression techniques to reduce laryngeal tension    Low impact glottic coup and soft cough    Techniques to raise awareness of habitual throat clearing    Counseling and Education:    Asked many questions about the nature of her symptoms, and I answered all of these thoroughly.    A revised regimen for home practice was instructed.    I provided an AVS of today's therapeutic activities to facilitate practice.    ASSESSMENT/PLAN  PROGRESS TOWARD LONG TERM GOALS:   Adequate progress; please see above    IMPRESSIONS: Irritable Larynx Syndrome (ILS) (J38.7), Chronic Throat Clearing (R68.89), Laryngeal Hyperfunction (J38.7) and Dysphonia (R49.0). Ms. Bangura demonstrated good learning of therapeutic activities that promote a reduction of her cough, laryngeal irritation, and improve her voice quality.     PLAN: I will see Ms. Bangura in May to " continue therapy.  For practice goals see AVS.     Next Clinic Appt: 5/8    TOTAL SERVICE TIME:   Call Initiated at: 1:59 PM  Call Ended at: 3pm           CPT Billing Codes:   TREATMENT (79298)  NO CHARGE FACILITY FEE (45552)    Sherita Domingo M.M. (voice), M.A., CCC/SLP  Speech-Language Pathologist  [facility] Trained Vocologist  [facility]  [phone]  [email or web address]  Pronouns: she/her      *this report was created in part through the use of computerized dictation software, and though reviewed following completion, some typographic errors may persist.  If there is confusion regarding any of this notes contents, please contact me for clarification

## 2023-02-27 NOTE — LETTER
"2/27/2023       RE: Kitty Bangura  [address] [place] [id number]     Dear Colleague,    Thank you for referring your patient, Kitty Bangura, to the [facility]ICE [facility] [place] at [facility]. Please see a copy of my visit note below.    Kitty Bangura is a 62 year old female who is being cared for via a billable virtual visit.        The patient has been notified and verbally consented to the following statements:     This video visit will be conducted between you and your provider.    If during the course of the call the provider feels a video visit is not appropriate, you will not be charged for this service.    Provider has received verbal consent for billable virtual visit from the patient? Yes    Preferred method for receiving information: [employer]     Call initiated at: 1:59 PM  Platform used to conduct today's virtual appointment: [employer] Well Video  Location of provider: Residence  Location of patient: [facility] VOICE [facility]  THERAPY NOTE (CPT 10840)  Patient: Kitty Bangura  Date of Service: 2/27/2023  Referring physician: Dr. Queen \"Shep\" Yaeling  Impressions from most recent evaluation (1/24/23):  \"IMPRESSIONS: Kitty Bangura is a 62 year old, presenting today with Irritable Larynx Syndrome (ILS) (J38.7), Chronic Throat Clearing (R68.89), Laryngeal Hyperfunction (J38.7) and Dysphonia (R49.0), as evidenced by evaluation the results of the evaluation and the laryngeal exam.    Remarkable findings included:    Perceptual evaluation demonstrated:   ? Roughness: Mild to moderate  ? Breathiness: Mild to moderate  ? Strain: Mild to moderate  ? Pitch:  ? F0/ Habitual Pitch: WNL       Laryngeal exam demonstrated:   Posterior commissure: mild to moderate hypertrophy and the presence of redundant mucosa  Secretions: mild  Very tight TH and even tenderness - no cough       Primary complaint of patient today included: voice and throat " "concerns  Therefore, we recommended a course of speech therapy to address these concerns.\"       SUBJECTIVE:  Since the last appointment, Ms. Bangura reports the following:     Overall she reports that symptoms are stable    Drinking water    Has been drinking propel, and thinks that it helps improve hydration.    Herbal tea is also helpful    gargling - after eating and with saline    Using saline spray and gel - helping because it is less dry.    Coughed more in the last few days.     Gets a cough more when exercising.     Throat clearing when talking more.     Less bubbles - still doing it 3x/day at minimum    Xylitol lozenges.     OBJECTIVE:  Ms. Bangura presents today with the following:  Voice quality:  ? Roughness: Mild to moderate  ? Breathiness: Mild to moderate  ? Strain: Mild to moderate  ? Pitch:  ? F0/ Habitual Pitch: WNL  THROAT ISSUES    Occasional dry throat clear      PATIENT REPORTED MEASURES:  Patient Supplied Answers To SLP QOL Questionnaire  No flowsheet data found.  Speech follow up as discussed with patient:  Dysponia SLP Goals 2/27/2023   How would you rate your speaking voice quality, if 0 is worst voice quality, and 10 is best voice? 6   How much effort is it to speak, if 0 is no extra effort and 10 is maximum effort? 2   How severe is your cough /throat clearing, if 0 is no cough at all and 10 is the worst cough? 4   How much does your voice problem bother you? Somewhat   How much does your cough/throat-clearing problem bother you?            A little bit     THERAPEUTIC ACTIVITIES    Demonstrated previous exercises.  o demonstrated improved technique  o appropriate redirection provided  o instruction provided for increased level of complexity/difficulty    Exercises to promote optimal respiratory mechanics    she demonstrated clavicular/neck/shoulder involvement in inhalation    Demonstrated difficulty allowing abdominal relaxation for inhalation    Practiced in a seated position, with " "tactile cue of a hand on the low rib-cage to facilitate awareness of low respiratory engagement.     With clinician support, patient was able to demonstrate improved abdominal relaxation and engagement on inhalation    Optimal exhalation using inward engagement of the abdominal wall with no corresponding collapse of the upper chest cavity was trained using the pulsed \"sh\" task    [place] a respiratory pacing exercise; this was helpful    acceptable improvement in airflow and respiratory mechanics    Exercises in techniques for improved airflow during phonation    Speech material with /ju/ glides was facilitating at the word level.    Progressed to easy onset/ flow, and blending phrases    Instructed with a descending 5th, as well as an arpeggio pattern; this was helpful.    [place] techniques to reduce glottal anderson and improve breath flow; negative practice improved awareness today.    Exercises and techniques for optimal vocal hygiene including:    Systemic hydration, including strategies for increasing daily water intake    Topical hydration - Gargling (saline and plain water), saline nasal irrigation, humidification, steam, guaifenesin to reduce the thickened secretions / laryngeal irritation.    Chronic cough / throat clearing reduction therapy    Suppression and substitution strategies were instructed including    Swallowing substitution techniques    Breathing suppression techniques to reduce laryngeal tension    Low impact glottic coup and soft cough    Techniques to raise awareness of habitual throat clearing    Counseling and Education:    Asked many questions about the nature of her symptoms, and I answered all of these thoroughly.    A revised regimen for home practice was instructed.    I provided an AVS of today's therapeutic activities to facilitate practice.    ASSESSMENT/PLAN  PROGRESS TOWARD LONG TERM GOALS:   Adequate progress; please see above    IMPRESSIONS: Irritable Larynx Syndrome (ILS) " (J38.7), Chronic Throat Clearing (R68.89), Laryngeal Hyperfunction (J38.7) and Dysphonia (R49.0). Ms. Bangura demonstrated good learning of therapeutic activities that promote a reduction of her cough, laryngeal irritation, and improve her voice quality.     PLAN: I will see Ms. Bangura in May to continue therapy.  For practice goals see AVS.     Next Clinic Appt: 5/8    TOTAL SERVICE TIME:   Call Initiated at: 1:59 PM  Call Ended at: 3pm           CPT Billing Codes:   TREATMENT (80940)  NO CHARGE FACILITY FEE (78064)      *this report was created in part through the use of computerized dictation software, and though reviewed following completion, some typographic errors may persist.  If there is confusion regarding any of this notes contents, please contact me for clarification          Again, thank you for allowing me to participate in the care of your patient.      Sincerely,    Sherita Domingo, SLP

## 2023-02-27 NOTE — PATIENT INSTRUCTIONS
"After Visit Summary    Patient: Kitty Bangura  Date of Visit: 2023    These notes are also available in your MyChart. Please take a few moments to find them under \"Past Appointments\" in the [employer] system, as Sherita will start to phase out e-mail communications.    \"Handouts\" that go along with today's order of activities include (below):   Frequency of practice:     Order of today's appointment:  Breathing:   In the morning and evening (twice daily) for 2-5 minutes:   Breathe while lying on your back with your face and knees up. Hands on tummy and chest.  Take a breath in with rounded lips and exhale with a  shhhh    Inhale  = Inflate; exhale = deflate  3x each: try breathin in/8 out, 5/10, 3/4  Throughout the day (2-3x/day for just a couple minutes) check breathing while keeping shoulders relaxed (riding to and from school, etc.)  Pulsed breathing Exercise (2-3x/day):   3x each  Sh, Sh, Sh...  S, S, S...  F, F, F.....  Now, all three combined Sh, Sh, Sh, S, S, S, F, F, F (tummy IN for each sound)  Shh exercise (loudness) : 1) one on one loudness 2) 2-3 people 3) 5-6 people loudness.  Feel breath in the abdominal area and not in the throat.   Breath Pacing:   Coordinate sipping breath with counting:  Breathe in through rounded lips with tongue behind lower teeth or touching at the bump behind upper teeth  Sip breath \"1\"  Sip breath \"1-2\"  Sip breath \"1-2-3\"  Sip breath \"1-2-3-4\"... up to 5      Rescue Breathing Techniques:  1. Breathing in through rounded lips and out with a \"sh\"  2. Breathing in through the nose and out with \"sh\"  3. Repeated sniffs/inhales through rounded lips and out with a \"sh\"     Breathing Tips:  Keep shoulders down and chest relaxed            Irritable Larynx Syndrome    What is Irritable Larynx Syndrome?  Irritable Larynx Syndrome (ILS) is a cluster of symptoms not associated with a specific disease process. Individuals with ILS can have any combination of the following " complaints:      Globus sensation (feeling of lump or some other sensation in the throat)  Throat irritation or burning sensation  Tightness of throat or neck  Chronic cough or throat clearing; sensation of need to clear throat  Effort or pain with swallowing  Paradoxical vocal fold motion (sensation of difficulty inhaling)  Laryngospasm (tightening of throat causing choking or difficulty inhaling)    What causes ILS?  ILS works in the same way as a mosquito bite: We might scratch the bite absentmindedly a couple of times, and suddenly we realize the bite really itches!  So we scratch it vigorously because that feels better for a few moments. In the long run though, scratching actually makes the itch worse.  In the same way, when individuals experience mild irritation in their throats for some reason, they might not realize that they've begun  scratching  the  itch,  (throat clearing) but over time the irritation worsens and becomes more noticeable.  This is how earlier, milder symptoms can be the precursors to more-severe symptoms. Chronic irritation of the mucosa in the laryngeal area can cause changes to the nerve pathways; they can become hyper-excitable, so that it only takes a small irritation to have a large sensory response (like a cough).  It's nice that the nervous system can learn, but in this case it has backfired!    Here are some possible irritants that can start the chain reaction (most individuals have more than one):  Upper respiratory infection with cough  Acid Reflux  Post Nasal Drainage  Allergens (e.g. tree, mold, pollen, pet dander)  Cigarette smoke or other kinds of smoke  Odors (e.g. perfume, hairspray)  Food sensitivities  Strong Emotions (e.g. anxiety, stress)  Hyperfunction of the muscles of the vocal mechanism  Harsh chemicals/  Cold Air    Evaluation of ILS  At the [facility] Voice [facility], an otolaryngologist and a speech-language pathologist work as a team to determine if ILS is a  problem.  Medical evaluation and laryngeal examination rule out any other cause for the symptoms.  Some medical treatment may be advised.  Functional evaluation determines whether there are behavioral or lifestyle factors that are contributing to the symptoms.      Treatment of ILS  Treatment of ILS addresses the cause of irritation. This can include:   Treatment of Acid Reflux This may include: Medications (what your MD prescribes), Dietary Precautions (what you eat and what supplements you take), Lifestyle Precautions (when you eat, avoiding environmental irritants), and Mechanical Precautions (how much pressure you put on your lower esophageal sphincter).  Functional Speech Therapy with a Speech-Language Pathologist (SLP). Your SLP will educate you about the disorder, help you improve the environment of your mucosa to reduce irritation, improve the movement and function of your larynx, and help reduce muscle tension and restore muscle balance.  Further Medical treatment is sometimes used to restore the medical basis for normal function and sensation.  Your MD will discuss these possibilities with you if they are relevant.      Cough and Throat Clearing  The biological purpose of the larynx is to protect the airway (trachea and lungs). Coughing and throat clearing are mechanisms that are meant to assist in the protection of our airway. When we feel something such as liquid, food, saliva, dust, etc. near our vocal folds, we will clear our throats and cough as a protective reflex. We also cough or throat clear if our airway is irritated.     Why can chronic coughing and throat clearing be harmful?  A cough is produced by squeezing the vocal folds together, building up pressure in the lungs, and then quickly forcing the vocal folds open to clear away whatever might be getting too close to our airway. This can be traumatic to the vocal folds, irritating them in the same way that our hands would be irritated if they  were being slapped together over and over. Coughing for a long period of time can cause the mucosa of the larynx and vocal folds to become hypersensitive, making it feel like something is threatening the airway.  The vocal folds need to cough or throat clear even when there isn't an actual physical threat to the airway.  The chronic coughing or throat clearing results in even more coughing or throat clearing.      Strategies to Reduce Irritation  Your speech-language pathologist will teach you techniques to use muscles optimally, in order to reduce irritation.  In the meantime, you can start reducing the irritation, using the following strategies:    Identifying your unique trigger; take steps to avoid it  Improve both systemic and topical (surface) hydration (dry mucosa is more easily irritated)  Drink adequate fluids   Use a humidifier, especially in the bedroom at night  Guaifenesin (e.g. Mucinex) to thin viscous secretions  Sucking on candy, or cough drops without menthol, or chew gum, to increase salivary stimulation  Alternative Behaviors to coughing  Swallow hard  Stimulate your oral cavity:   Sip hot, cold, carbonated, or flavored water  Suck on ice chips  Bite your tongue or cheek (not too hard!)  Massage under your chin  Gargle  Gentle hums or vocal exercises taught to you by your SLP  Say  eh eh eh eh  silently (for a gentle, non-irritating throat-clear)  Sniff or pursed lip inhale and exhale on  Shhh  like shushing a baby  Sniff or pursed lip inhale and exhale on  zzz  like a buzzing bee  Wait. While you are waiting, try the above behaviors instead    Sherita Domingo M.M. (voice), M.A., CCC/SLP  Speech-Language Pathologist  [facility] Certified Vocologist  [facility]  [email or web address]  she/her

## 2023-03-28 PROBLEM — F10.20 ALCOHOL DEPENDENCE (H): Status: ACTIVE | Noted: 2023-03-28

## 2023-04-06 ENCOUNTER — HOSPITAL ENCOUNTER (OUTPATIENT)
Dept: CT IMAGING | Facility: CLINIC | Age: 63
Discharge: HOME | End: 2023-04-06
Attending: INTERNAL MEDICINE
Payer: COMMERCIAL

## 2023-04-06 ENCOUNTER — LAB (OUTPATIENT)
Dept: LAB | Facility: CLINIC | Age: 63
End: 2023-04-06
Attending: INTERNAL MEDICINE
Payer: COMMERCIAL

## 2023-04-06 DIAGNOSIS — C25.0 MALIGNANT NEOPLASM OF HEAD OF PANCREAS (H): ICD-10-CM

## 2023-04-06 LAB
CREAT BLD-MCNC: 0.7 MG/DL (ref 0.6–1.1)
GFR SERPL CREATININE-BSD FRML MDRD: >60 ML/MIN/1.73M2

## 2023-04-06 PROCEDURE — 36415 COLL VENOUS BLD VENIPUNCTURE: CPT

## 2023-04-06 PROCEDURE — 82565 ASSAY OF CREATININE: CPT

## 2023-04-06 PROCEDURE — 250N000011 HC RX IP 250 OP 636: Performed by: INTERNAL MEDICINE

## 2023-04-06 PROCEDURE — 74177 CT ABD & PELVIS W/CONTRAST: CPT

## 2023-04-06 PROCEDURE — 86301 IMMUNOASSAY TUMOR CA 19-9: CPT

## 2023-04-06 RX ORDER — IOPAMIDOL 755 MG/ML
100 INJECTION, SOLUTION INTRAVASCULAR ONCE
Status: COMPLETED | OUTPATIENT
Start: 2023-04-06 | End: 2023-04-06

## 2023-04-06 RX ADMIN — IOPAMIDOL 100 ML: 755 INJECTION, SOLUTION INTRAVENOUS at 11:31

## 2023-04-07 LAB — CANCER AG19-9 SERPL IA-ACNC: <2 U/ML

## 2023-04-10 ENCOUNTER — VIRTUAL VISIT (OUTPATIENT)
Dept: ONCOLOGY | Facility: CLINIC | Age: 63
End: 2023-04-10
Attending: INTERNAL MEDICINE
Payer: COMMERCIAL

## 2023-04-10 DIAGNOSIS — C25.9 MALIGNANT NEOPLASM OF PANCREAS, UNSPECIFIED LOCATION OF MALIGNANCY (H): Primary | ICD-10-CM

## 2023-04-10 PROCEDURE — 99214 OFFICE O/P EST MOD 30 MIN: CPT | Mod: VID | Performed by: INTERNAL MEDICINE

## 2023-04-10 PROCEDURE — G0463 HOSPITAL OUTPT CLINIC VISIT: HCPCS | Mod: PN,GT | Performed by: INTERNAL MEDICINE

## 2023-04-10 NOTE — LETTER
4/10/2023         RE: Kitty Bangura  [address] [place] [id number]        Dear Colleague,    Thank you for referring your patient, Kitty Bangura, to the [facility]. Please see a copy of my visit note below.    Virtual Visit Details    Type of service:  Video Visit       Originating Location (pt. Location): Home    Distant Location (provider location):  On-site  Platform used for Video Visit: [facility]        Oncology Follow-up Visit:  Apr 10, 2023    Cancer diagnosis: cystic pancreatic tail adenocarcinoma  small subcentimeter pulmonary nodules seen on staging scans of unclear etiology.     Treatment to date: neoadjuvant FOLFIRINOX x4 cycles, 1/15/18-3/6/18  Difficulty tolerating neoadjuvant intent chemotherapy. Distal pancreatectomy performed April 17, 2018, no residual carcinoma seen on operative pathology.  Treated with four cycles of adjuvant chemotherapy with gemcitabine and Xeloda, completed September 2018.     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    COVID VACCINATION: April 2021 Moderna; booster Nov 2021, July 2022.      Referring physician: Dr. González Metz MD    Oncology History: She was previously healthy until she presented in early November 2017 with abdominal pain. CT abdomen on 11/1/17 showed acute pancreatitis (lipase 41,960) and a 3cm heterogenous pancreatic tail mass. The etiology of pancreatitis is unclear - no obstructing gallstone and not a heavy drinker. She was hospitalized for one week and followed up with Dr. González Metz in GI clinic.     11/29/17 -- endoscopic drainage of walled-off area of pancreatic necrosis by Dr. Metz. During the same procedure she had an EUS FNA of the pancreatic tail mass and pathology showed adenocarcinoma. The mass measured 33 x 27  mm, encapsulated with solid and cystic components, rim calcification, and no evidence of invasion.     12/9/17 -- Staging CT chest/abd/pelvis showed several small pulmonary nodules (largest 3mm), unchanged mass in the pancreatic tail, mildly prominent mesenteric nodes thought likely reactive, and small right hepatic lobe hypodensities. Abdominal MRI on 12/10/17 showed hepatic hemangiomas, no evidence of metastatic disease to the liver.     1/8/18 - - oncology consultation, Dr. Monk. Recommendation: neoadjuvant intent chemotherapy with FOLFIRINOX.    1/15/18 - - cycle 1/day one FOLFIRINOX chemotherapy.    1/20 through 1/27/18 - - hospitalization at the [facility], for acute pancreatitis. Following three days of nausea, vomiting, pain. During this hospitalization: ERCP was attempted by Dr. Sanches with pancreatic stent placement, but pancreatic duct was completely obstructed and wire was unable to pass beyond the duct. Dr. Metz performed successful US guided cyst gastrostomy January 25, 2018.    1/31/18 - - oncology follow-up, DANTE Talavera. Neutropenic with ANC 0.4, thus defer cycle two of chemotherapy.    2/5/18 - - oncology follow-up, DANTE Junior.  Cycle 2/day one FOLFIRINOX chemotherapy. Patient endorses cold sensitivity secondary to oxaliplatin. Neulasta given for growth factor support. Due to significant neutropenia with cycle one.    2/11/18 - - ER evaluation for epigastric pain. Discharged to home from ER. Leukocytosis secondary to Neulasta given on February 8.    2/20/18 - - oncology follow-up, DANTE Junior. Cycle 3/day one FOLFIRINOX given without Neulasta. At patient request, oxaliplatin dose reduced by 10% due to neuropathy/cold sensitivity.    3/6/18 - - oncology follow-up, DANTE Talavera. Cycle 4/day one FOLFIRINOX chemotherapy.    3/13/18 - - CT chest/abdomen/pelvis - - IMPRESSION: 1. Minimal decrease in size and marked decrease in enhancement and  pancreatic mass since the comparison study. 2. No significant change in low dense lesions in the liver since comparison.    3/16/18 - - oncology follow-up Dr. Monk. Plan is to hold on further chemotherapy as surgery is scheduled for 4/17/18 4/17/18 - - surgery: PROCEDURE: Diagnostic laparoscopy, splenic flexure mobilization, intraoperative ultrasound, distal pancreatectomy, splenectomy, and cholecystectomy. SURGEON: Ja Byrd MD  Final surgical pathology showed necrotizing pancreatitis, no evidence of residual cancer, complete pathologic response, 26 benign lymph nodes.  FINAL DIAGNOSIS: A. DISTAL PANCREAS, SPLEEN AND OMENTUM, RESECTION: - Necrotizing pancreatitis with residual acellular mucin and foci of high grade dysplasia, status post neoadjuvant therapy   - Treatment response: complete (score 0)   - Twenty-six benign lymph nodes (0/26)   - Negative for residual carcinoma   - Pathologic staging: [id number]   - Focal infarction, metaplastic bone formation - Surgical margins are free of neoplasia - Unremarkable spleen and omentum   B. GALLBLADDER, CHOLECYSTECTOMY: - Benign gallbladder, biliary mucosa with no significant histologic abnormality - Negative for dysplasia COMMENT: Histologic sections demonstrate pools of acellular mucin, necrosis and scattered high grade dysplasia (PanIN-3) with no evidence of residual invasive carcinoma    4/30/18 - - surgical oncology follow-up, Dr. Byrd.  5/4/18 - - palliative care follow-up Dr. Snow. No specific recommendations required at this time.  5/14/18 - - oncology follow-up, Dr. Monk. Plan: adjuvant chemotherapy with gemcitabine and Xeloda.  5/30/18 - - cycle 1/day one adjuvant chemotherapy with gemcitabine and Xeloda.  Cycle three was dose reduced due to mucositis secondary to Xeloda.  8/28/18 - - oncology follow-up, DANTE Talavera. Cycle 4/day one gemcitabine and Xeloda.  9/17/18 - - CT chest/abdomen/pelvis - -IMPRESSION:  Resolving postoperative changes. No  new findings or evidence of metastatic disease.  9/24/18 - - oncology follow-up, Dr. Monk. PLAN: initiate active surveillance.  12/21/18--CT c/a/p--IMPRESSION:  1. Stable tiny 2 to 3 mm lung nodules as described above. This  suggests a benign etiology.  2. Two low density liver lesions. These appear to vary in appearance  depending on slight variations in timing of the bolus of contrast.  Likely no significant change. Recommend continued close surveillance.  3. No evidence of recurrent mass in the abdomen or pelvis. No  adenopathy.  12/28/18--scheduled oncology follow-up, Dr Monk. PATIENT NO-SHOW.  12/31/18--oncology follow-up, Dr Monk. Plan: Continue surveillance.  Increase Creon dose due to fatty food intolerance/weight loss.    3/29/19--CT c/a/p-- IMPRESSION: Stable scan compared to 12/21/2018 with the following  findings:  1. Several bilateral small pulmonary nodules, unchanged in appearance.  2. Two right hepatic small hypodense lesions, possibly hemangiomas.  These are also stable in appearance.  3. No apparent recurrent mass.  4/5/19 - - oncology follow-up, Dr. Monk.     7/1/19--CT c/a/p--IMPRESSION:  Stable examination with no convincing metastatic or  recurrent neoplasm.  7/8/19--oncology follow-up, Dr. Monk.    9/30/19 - - CT chest/abdomen/pelvis - - IMPRESSION:  1.  There is a small grouped area of nodularity in the right middle  lobe. Grouped nature favors an inflammatory etiology.  2.  The abdomen CT is stable with no convincing metastatic or  recurrent neoplasm.  10/7/19 - - oncology follow-up, Dr. Monk.    12/13/19 - - CT chest/abdomen/pelvis - - IMPRESSION:  1.  Stable abdomen/pelvis CT with no convincing metastatic or  recurrent neoplasm.  2. New/additional small focus of grouped nodularity in the right  middle lobe. Inflammatory etiology is still favored.  12/20/19 - - oncology follow-up, Dr. Monk.    4/13/20--CT c/a/p--IMPRESSION:  Interval resolution of suspected inflammatory change in right middle  lobe. Otherwise, stable examination.       4/20/20--oncology follow-up, Dr. Monk.      8/3/20--CT c/a/p--IMPRESSION:  Stable examination with no convincing metastatic or  recurrent neoplasm.    August 10, 2020 -- oncology follow-up/virtual visit, Dr. Monk.  12/8/20--CT c/a/p--IMPRESSION:  1.  Stable appearance of distal pancreatectomy and splenectomy.  2.  Stable tiny nodules through both lungs.  3.  New area of likely atelectasis in the posterior left apex  measuring 6 mm. Follow-up would be recommended in six months or to  correspond with the next routine oncologic follow-up.  4.  Two stable small ill-defined lesions in the right hepatic lobe.    December 11, 2020 -- oncology follow-up/virtual visit, Dr. Monk.    3/29/21--CT c/a/p--                                                               IMPRESSION:  1.  Stable appearance of distal pancreatectomy and splenectomy.  2.  The questioned nodular area in the posterior left apex on the  previous exam has nearly completely resolved.  3.  There is a new 8 mm slightly lobulated nodular opacity in the  anterior left lower lobe. Given the appearance over the course of  three months this is likely benign, but follow-up is recommended.  4.  Interval slight increase in size of level 1 and level 2 left  axillary lymph nodes that are now prominent to borderline enlarged.  These could be due to reactive process, but attention is recommended  in future exams.  5. Two low-attenuation areas are again identified in the liver. One of  these appears minimally increased in size, but these are both still  below 1 cm in size. Continued attention to this area would be  recommended, but these are likely benign.    April 5, 2021 -- oncology follow-up/virtual visit, Dr. Monk.    July 28, 2021 --wedge resection surgery for new lung nodule seen on scan:  Final Diagnosis   Lung, left lower lobe, wedge resection:  -Carcinoid tumorlet, 0.3 cm  -Focal neuroendocrine cell hyperplasia  -Granulomatous  inflammation with central necrosis          11/22/21--CT chest: IMPRESSION:  Resolving postop change in the left lung. No new finding.  11/22/21--MRI abdomen --IMPRESSION: In this patient with history of pancreatic neuroendocrine  tumor status post distal pancreatectomy and splenectomy, again noted a  few small hepatic lesions which demonstrate no significant change in  size as compared to prior studies dating back to 12/13/2019 exam.  Although these are too small to characterize, they are likely benign  given their interval stability. No new suspicious focal hepatic  lesion. Recommend continued attention on follow-up.       November 29, 2021 -- oncology follow-up/virtual visit, Dr. Monk.    4/12/22--CT chest - IMPRESSION:   1.  No interval change to most recent comparison dated 11/22/2021.  2.  Status post left lower lobe wedge resection.  3.  A few unchanged punctate pulmonary nodules, likely benign.  4.  No new or enlarging pulmonary nodules.    4/12/22-MRI abdomen--IMPRESSION: In this patient with history of pancreatic neuroendocrine [sic]  tumor status post distal pancreatectomy and splenectomy, again noted a  few small hepatic lesions which demonstrate no significant change in  size as compared to prior studies dating back to 12/13/2019 exam.  Although these are too small to characterize, they are likely benign  given their interval stability. No new suspicious focal hepatic  Lesion.    April 22, 2022 -- oncology follow-up/virtual visit, Dr. Monk.    10/11/22--CT c/a/p--IMPRESSION:  1.  No evidence of recurrent or metastatic disease.  2.  Similar 2 mm pulmonary micronodules.  3.  Stable pancreatectomy and splenectomy.  4.  Left lower lobe wedge resection.    October 17, 2022 -- oncology follow-up/virtual visit, Dr. Monk.      April 6, 2023--CT c/a/p--IMPRESSION:  1.  No evidence of recurrent or metastatic disease.  2.  A new small cluster of small pulmonary nodules in the right middle lobe is most likely  infectious or inflammatory. Recommend attention on follow-up imaging.    April 10, 2023 -- oncology follow-up/virtual visit, Dr. Monk.      Interim History:  Ms. Bangura joins me from her home for a virtual video visit.      She is doing well and she and her  enjoy a vacation in [place] in March. Overall, the last several months she has had issues with hoarseness of her voice.  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  She is under the care of our ENT physician, Dr. Enrique Ward, for this condition, that she feels may have been due to prior lung interventions due to a finding of a lung nodule.  She has an endoscopy scheduled for 06/06 with our GI team for oropharyngeal dysphagia.  I presume that appointment was made at the request of the ENT physicians.      Overall, since I am following her for pancreas cancer, I noted that next week she will be able to celebrate 5 years since her surgical resection done with intent to cure.  She is very happy about this and it is on her calendar.  The CT scan of the chest, abdomen and pelvis done on 04/06/2023 shows no evidence of metastatic disease.  The small cluster of pulmonary nodules are very hard to see as I personally reviewed the images and the radiologist's report indicates most likely they are infectious or inflammatory in origin.  I reviewed the fact that pancreas cancer, if it were to recur, would most likely occur within the first 2 years of diagnosis and treatment.  She is well beyond that by several years.  In addition, as the 5-year survival rate is less than 10% for pancreas carcinoma, she is in [employer] and I congratulated her on that point.  She is feeling well.  She continues with her primary care physician for primary care for age-appropriate issues and screening.           Latest Reference Range & Units 06/28/21 09:44 04/12/22 11:57 10/11/22 13:08 04/06/23 11:06   Cancer Antigen 19-9 <=35 U/mL 3 <2 <2 <2      Latest Reference Range & Units  21 09:10 21 09:44 22 11:57   Cancer Antigen 19-9 <=35 U/mL 2 [1] 3 [2] <2 [3]     Review Of Systems: Comprehensive 14-point ROS reviewed. Pertinent symptoms reviewed above per HPI.    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 She has a 30+ yr-old son. Works as . former smoker, quit 30 years ago. two glasses of wine per night, none since pancreatitis diagnosis     Allergies: none      Current Outpatient Medications:     acetaminophen (TYLENOL) 500 MG tablet, Take 2 tablets (1,000 mg) by mouth every 8 hours, Disp: 100 tablet, Rfl: 1    alcohol swab prep pads, Use to swab area of injection/avel as directed., Disp: 100 each, Rfl: 3    amylase-lipase-protease (CREON) 18103-75113 units CPEP per EC capsule, TAKE 4 CAPSULES WITH MEALS AND 2 CAPSULES WITH SNACKS, UP TO 16 CAPSULES PER DAY, Disp: 1440 capsule, Rfl: 3    blood glucose (NO BRAND SPECIFIED) test strip, Use to test blood sugar 2 times daily or as directed. To accompany: Blood Glucose Monitor Brands: per insurance., Disp: 100 strip, Rfl: 6    blood glucose (ONETOUCH VERIO IQ) test strip, Use to test blood sugar 4 times daily or as directed., Disp: 400 each, Rfl: 3    blood glucose calibration (NO BRAND SPECIFIED) solution, To accompany: Blood Glucose Monitor Brands: per insurance., Disp: 1 each, Rfl: 3    blood glucose monitoring (NO BRAND SPECIFIED) meter device kit, Use to test blood sugar 2 times daily or as directed. Preferred blood glucose meter OR supplies to accompany: Blood  "Glucose Monitor Brands: per insurance., Disp: 1 kit, Rfl: 0    clobetasol (TEMOVATE) 0.05 % GEL topical gel, Apply topically 2 times daily, Disp: 30 g, Rfl: 1    clobetasol (TEMOVATE) 0.05 % GEL topical gel, Apply topically 2 times daily, Disp: 15 g, Rfl: 1    gabapentin (NEURONTIN) 100 MG capsule, Take 3 capsules (300 mg) by mouth 3 times daily, Disp: 270 capsule, Rfl: 3    metFORMIN (GLUCOPHAGE XR) 500 MG 24 hr tablet, Take 1 tablet (500 mg) by mouth 2 times daily (with meals), Disp: 180 tablet, Rfl: 3    OneTouch Delica Lancets 33G MISC, 4 lancets daily, Disp: 150 each, Rfl: 3    thin (NO BRAND SPECIFIED) lancets, Use with lanceting device. To accompany: Blood Glucose Monitor Brands: per insurance., Disp: 100 each, Rfl: 6  No current facility-administered medications for this visit.    Facility-Administered Medications Ordered in Other Visits:     heparin 100 UNIT/ML injection 5 mL, 5 mL, Intracatheter, Once, Art Guevara MD      Physical Exam:  In-person physical exam could not be performed today in context of a Virtual Visit. Observed physical assessments made today by visualizing the patient by video link:  Vitals - Patient Reported  Systolic (Patient Reported):  (unable to obtain BP)  Weight (Patient Reported): 59.9 kg (132 lb)  Height (Patient Reported): 165.1 cm (5' 5\")  BMI (Based on Pt Reported Ht/Wt): 21.97  Pain Score: Moderate Pain (4)  Pain Loc: Abdomen             General/Constitutional: Generally appears well, not acutely ill.  HEENT: no scleral icterus, not jaundiced.  Respiratory: no labored breathing.  Musculoskeletal: appears to have full range of motion and adequate physical strength.  Skin: no jaundice, discoloration or other notable lesions.  Neurological: no evidence of tremors.  Psychiatric: no evident anxiety; fully alert and oriented with fluent speech.      The rest of a comprehensive physical examination is deferred due to nature of video visits.    Laboratory/Imaging " Studies  Prior to and including the day of this visit, I personally reviewed the recent imaging scans. I released the pertinent recent imaging results to [employer] in advance of this visit, and reviewed the summary verbatim and in lay language during today's call.      ASSESSMENT/PLAN:  Mrs. Bangura is a 62 year old woman with resected pancreatic tail adenocarcinoma.  She had initial extensive and severe necrotizing pancreatitis upon initial diagnosis and hospitalization that delayed treatment.  We ultimately were able to begin neoadjuvant FOLFIRINOX for 4 cycles beginning in January.  By the time she had pancreatectomy by Dr. Byrd in mid April there was no evidence of residual carcinoma seen on operative pathology.  She completed subsequently 4 months of gemcitabine and Xeloda in the adjuvant setting.        She had a wedge resection of her lung-based carcinoid tumor that is 3 mm in diameter.  This finding was completely unrelated to her pancreatic adenocarcinoma, which was resected April 2018.    I congratulated her on being without evidence of disease 5 years out from her resection done with intent to cure.  I discussed that we could consider a transition to our Long-Term Cancer Survivorship Clinic due to the findings on the CT scan, which I think are more likely infectious or inflammatory in nature, and it may be due to oral secretions. In particular, I noted they were in the right middle lobe and may be due to causes of oral lichen planus and other gastroesophageal or oropharyngeal dysphagia issues.  It may be due to inhaled secretion or other issues.      Nonetheless, I think we can follow up with a CT scan one more time in 08-09/2023.  At that point, if those findings are not related to anything concerning for cancer recurrence, then transition to our Long-Term Survivorship Clinic.  She feels she would be more comfortable with that plan than immediate transition to the Long-Term Survivorship Clinic.  We will  go ahead and do that accordingly.  I noted once again her  is within normal limits and unremarkable.  She has a 06/06 upper endoscopy already scheduled with GI, and she will continue to follow up with ENT as needed.  We will move forward accordingly.          VIRTUAL VISIT - DETAILS:    I have reviewed the note as documented above. This accurately captures the substance of my conversation with the patient.    Date of call: April 10, 2023   Start of call: 8:52 am  End of call: 9:03 am    Provider location: [facility] (academic office)  Patient location: Home      Mode of Video Visit: Lynnette             I spent 11 minutes in consultation, including history and discussion with the patient including review of recent lab values and radiologic imaging results.  An additional 13 minutes was spent on the day of the visit, including reviewing pertinent medical notes and documentation from other physicians and APPs who have evaluated and treated this patient, pertinent lab values, pathology and imaging results, personal review of radiologic images, discussing the case with referring providers and/or nurse coordinator team, post-visit orders, and all post-visit documentation.    Jovany Monk MD PhD

## 2023-04-10 NOTE — PROGRESS NOTES
Virtual Visit Details    Type of service:  Video Visit       Originating Location (pt. Location): Home    Distant Location (provider location):  On-site  Platform used for Video Visit: [facility]        Oncology Follow-up Visit:  Apr 10, 2023    Cancer diagnosis: cystic pancreatic tail adenocarcinoma  small subcentimeter pulmonary nodules seen on staging scans of unclear etiology.     Treatment to date: neoadjuvant FOLFIRINOX x4 cycles, 1/15/18-3/6/18  Difficulty tolerating neoadjuvant intent chemotherapy. Distal pancreatectomy performed April 17, 2018, no residual carcinoma seen on operative pathology.  Treated with four cycles of adjuvant chemotherapy with gemcitabine and Xeloda, completed September 2018.     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    COVID VACCINATION: April 2021 Moderna; booster Nov 2021, July 2022.      Referring physician: Dr. González Metz MD    Oncology History: She was previously healthy until she presented in early November 2017 with abdominal pain. CT abdomen on 11/1/17 showed acute pancreatitis (lipase 41,960) and a 3cm heterogenous pancreatic tail mass. The etiology of pancreatitis is unclear - no obstructing gallstone and not a heavy drinker. She was hospitalized for one week and followed up with Dr. González Metz in GI clinic.     11/29/17 -- endoscopic drainage of walled-off area of pancreatic necrosis by Dr. Metz. During the same procedure she had an EUS FNA of the pancreatic tail mass and pathology showed adenocarcinoma. The mass measured 33 x 27 mm, encapsulated with solid and cystic components, rim calcification, and no evidence of invasion.     12/9/17 -- Staging CT chest/abd/pelvis showed several small pulmonary nodules (largest 3mm), unchanged mass in the pancreatic tail, mildly prominent mesenteric  nodes thought likely reactive, and small right hepatic lobe hypodensities. Abdominal MRI on 12/10/17 showed hepatic hemangiomas, no evidence of metastatic disease to the liver.     1/8/18 - - oncology consultation, Dr. Monk. Recommendation: neoadjuvant intent chemotherapy with FOLFIRINOX.    1/15/18 - - cycle 1/day one FOLFIRINOX chemotherapy.    1/20 through 1/27/18 - - hospitalization at the [facility], for acute pancreatitis. Following three days of nausea, vomiting, pain. During this hospitalization: ERCP was attempted by Dr. Sanches with pancreatic stent placement, but pancreatic duct was completely obstructed and wire was unable to pass beyond the duct. Dr. Metz performed successful US guided cyst gastrostomy January 25, 2018.    1/31/18 - - oncology follow-up, DANTE Talavera. Neutropenic with ANC 0.4, thus defer cycle two of chemotherapy.    2/5/18 - - oncology follow-up, DANTE Junior.  Cycle 2/day one FOLFIRINOX chemotherapy. Patient endorses cold sensitivity secondary to oxaliplatin. Neulasta given for growth factor support. Due to significant neutropenia with cycle one.    2/11/18 - - ER evaluation for epigastric pain. Discharged to home from ER. Leukocytosis secondary to Neulasta given on February 8.    2/20/18 - - oncology follow-up, DANTE Junior. Cycle 3/day one FOLFIRINOX given without Neulasta. At patient request, oxaliplatin dose reduced by 10% due to neuropathy/cold sensitivity.    3/6/18 - - oncology follow-up, DANTE Talavera. Cycle 4/day one FOLFIRINOX chemotherapy.    3/13/18 - - CT chest/abdomen/pelvis - - IMPRESSION: 1. Minimal decrease in size and marked decrease in enhancement and pancreatic mass since the comparison study. 2. No significant change in low dense lesions in the liver since comparison.    3/16/18 - - oncology follow-up Dr. Monk. Plan is to hold on further chemotherapy as surgery is scheduled for 4/17/18 4/17/18 - - surgery:  PROCEDURE: Diagnostic laparoscopy, splenic flexure mobilization, intraoperative ultrasound, distal pancreatectomy, splenectomy, and cholecystectomy. SURGEON: Ja Byrd MD  Final surgical pathology showed necrotizing pancreatitis, no evidence of residual cancer, complete pathologic response, 26 benign lymph nodes.  FINAL DIAGNOSIS: A. DISTAL PANCREAS, SPLEEN AND OMENTUM, RESECTION: - Necrotizing pancreatitis with residual acellular mucin and foci of high grade dysplasia, status post neoadjuvant therapy   - Treatment response: complete (score 0)   - Twenty-six benign lymph nodes (0/26)   - Negative for residual carcinoma   - Pathologic staging: [id number]   - Focal infarction, metaplastic bone formation - Surgical margins are free of neoplasia - Unremarkable spleen and omentum   B. GALLBLADDER, CHOLECYSTECTOMY: - Benign gallbladder, biliary mucosa with no significant histologic abnormality - Negative for dysplasia COMMENT: Histologic sections demonstrate pools of acellular mucin, necrosis and scattered high grade dysplasia (PanIN-3) with no evidence of residual invasive carcinoma    4/30/18 - - surgical oncology follow-up, Dr. Byrd.  5/4/18 - - palliative care follow-up Dr. Snow. No specific recommendations required at this time.  5/14/18 - - oncology follow-up, Dr. Monk. Plan: adjuvant chemotherapy with gemcitabine and Xeloda.  5/30/18 - - cycle 1/day one adjuvant chemotherapy with gemcitabine and Xeloda.  Cycle three was dose reduced due to mucositis secondary to Xeloda.  8/28/18 - - oncology follow-up, DANTE Talavera. Cycle 4/day one gemcitabine and Xeloda.  9/17/18 - - CT chest/abdomen/pelvis - -IMPRESSION:  Resolving postoperative changes. No new findings or evidence of metastatic disease.  9/24/18 - - oncology follow-up, Dr. Monk. PLAN: initiate active surveillance.  12/21/18--CT c/a/p--IMPRESSION:  1. Stable tiny 2 to 3 mm lung nodules as described above. This  suggests a benign etiology.  2. Two low  density liver lesions. These appear to vary in appearance  depending on slight variations in timing of the bolus of contrast.  Likely no significant change. Recommend continued close surveillance.  3. No evidence of recurrent mass in the abdomen or pelvis. No  adenopathy.  12/28/18--scheduled oncology follow-up, Dr Monk. PATIENT NO-SHOW.  12/31/18--oncology follow-up, Dr Monk. Plan: Continue surveillance.  Increase Creon dose due to fatty food intolerance/weight loss.    3/29/19--CT c/a/p-- IMPRESSION: Stable scan compared to 12/21/2018 with the following  findings:  1. Several bilateral small pulmonary nodules, unchanged in appearance.  2. Two right hepatic small hypodense lesions, possibly hemangiomas.  These are also stable in appearance.  3. No apparent recurrent mass.  4/5/19 - - oncology follow-up, Dr. Monk.     7/1/19--CT c/a/p--IMPRESSION:  Stable examination with no convincing metastatic or  recurrent neoplasm.  7/8/19--oncology follow-up, Dr. Monk.    9/30/19 - - CT chest/abdomen/pelvis - - IMPRESSION:  1.  There is a small grouped area of nodularity in the right middle  lobe. Grouped nature favors an inflammatory etiology.  2.  The abdomen CT is stable with no convincing metastatic or  recurrent neoplasm.  10/7/19 - - oncology follow-up, Dr. Monk.    12/13/19 - - CT chest/abdomen/pelvis - - IMPRESSION:  1.  Stable abdomen/pelvis CT with no convincing metastatic or  recurrent neoplasm.  2. New/additional small focus of grouped nodularity in the right  middle lobe. Inflammatory etiology is still favored.  12/20/19 - - oncology follow-up, Dr. Monk.    4/13/20--CT c/a/p--IMPRESSION:  Interval resolution of suspected inflammatory change in right middle lobe. Otherwise, stable examination.       4/20/20--oncology follow-up, Dr. Monk.      8/3/20--CT c/a/p--IMPRESSION:  Stable examination with no convincing metastatic or  recurrent neoplasm.    August 10, 2020 -- oncology follow-up/virtual visit, Dr. Monk.  12/8/20--CT  c/a/p--IMPRESSION:  1.  Stable appearance of distal pancreatectomy and splenectomy.  2.  Stable tiny nodules through both lungs.  3.  New area of likely atelectasis in the posterior left apex  measuring 6 mm. Follow-up would be recommended in six months or to  correspond with the next routine oncologic follow-up.  4.  Two stable small ill-defined lesions in the right hepatic lobe.    December 11, 2020 -- oncology follow-up/virtual visit, Dr. Monk.    3/29/21--CT c/a/p--                                                               IMPRESSION:  1.  Stable appearance of distal pancreatectomy and splenectomy.  2.  The questioned nodular area in the posterior left apex on the  previous exam has nearly completely resolved.  3.  There is a new 8 mm slightly lobulated nodular opacity in the  anterior left lower lobe. Given the appearance over the course of  three months this is likely benign, but follow-up is recommended.  4.  Interval slight increase in size of level 1 and level 2 left  axillary lymph nodes that are now prominent to borderline enlarged.  These could be due to reactive process, but attention is recommended  in future exams.  5. Two low-attenuation areas are again identified in the liver. One of  these appears minimally increased in size, but these are both still  below 1 cm in size. Continued attention to this area would be  recommended, but these are likely benign.    April 5, 2021 -- oncology follow-up/virtual visit, Dr. Monk.    July 28, 2021 --wedge resection surgery for new lung nodule seen on scan:  Final Diagnosis   Lung, left lower lobe, wedge resection:  -Carcinoid tumorlet, 0.3 cm  -Focal neuroendocrine cell hyperplasia  -Granulomatous inflammation with central necrosis          11/22/21--CT chest: IMPRESSION:  Resolving postop change in the left lung. No new finding.  11/22/21--MRI abdomen --IMPRESSION: In this patient with history of pancreatic neuroendocrine  tumor status post distal  pancreatectomy and splenectomy, again noted a  few small hepatic lesions which demonstrate no significant change in  size as compared to prior studies dating back to 12/13/2019 exam.  Although these are too small to characterize, they are likely benign  given their interval stability. No new suspicious focal hepatic  lesion. Recommend continued attention on follow-up.       November 29, 2021 -- oncology follow-up/virtual visit, Dr. Monk.    4/12/22--CT chest - IMPRESSION:   1.  No interval change to most recent comparison dated 11/22/2021.  2.  Status post left lower lobe wedge resection.  3.  A few unchanged punctate pulmonary nodules, likely benign.  4.  No new or enlarging pulmonary nodules.    4/12/22-MRI abdomen--IMPRESSION: In this patient with history of pancreatic neuroendocrine [sic]  tumor status post distal pancreatectomy and splenectomy, again noted a  few small hepatic lesions which demonstrate no significant change in  size as compared to prior studies dating back to 12/13/2019 exam.  Although these are too small to characterize, they are likely benign  given their interval stability. No new suspicious focal hepatic  Lesion.    April 22, 2022 -- oncology follow-up/virtual visit, Dr. Monk.    10/11/22--CT c/a/p--IMPRESSION:  1.  No evidence of recurrent or metastatic disease.  2.  Similar 2 mm pulmonary micronodules.  3.  Stable pancreatectomy and splenectomy.  4.  Left lower lobe wedge resection.    October 17, 2022 -- oncology follow-up/virtual visit, Dr. Monk.      April 6, 2023--CT c/a/p--IMPRESSION:  1.  No evidence of recurrent or metastatic disease.  2.  A new small cluster of small pulmonary nodules in the right middle lobe is most likely infectious or inflammatory. Recommend attention on follow-up imaging.    April 10, 2023 -- oncology follow-up/virtual visit, Dr. Monk.      Interim History:  Ms. Bangura joins me from her home for a virtual video visit.      She is doing well and she and her   enjoy a vacation in [place] in March. Overall, the last several months she has had issues with hoarseness of her voice.  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  She is under the care of our ENT physician, Dr. Enrique Ward, for this condition, that she feels may have been due to prior lung interventions due to a finding of a lung nodule.  She has an endoscopy scheduled for 06/06 with our GI team for oropharyngeal dysphagia.  I presume that appointment was made at the request of the ENT physicians.      Overall, since I am following her for pancreas cancer, I noted that next week she will be able to celebrate 5 years since her surgical resection done with intent to cure.  She is very happy about this and it is on her calendar.  The CT scan of the chest, abdomen and pelvis done on 04/06/2023 shows no evidence of metastatic disease.  The small cluster of pulmonary nodules are very hard to see as I personally reviewed the images and the radiologist's report indicates most likely they are infectious or inflammatory in origin.  I reviewed the fact that pancreas cancer, if it were to recur, would most likely occur within the first 2 years of diagnosis and treatment.  She is well beyond that by several years.  In addition, as the 5-year survival rate is less than 10% for pancreas carcinoma, she is in [employer] and I congratulated her on that point.  She is feeling well.  She continues with her primary care physician for primary care for age-appropriate issues and screening.           Latest Reference Range & Units 06/28/21 09:44 04/12/22 11:57 10/11/22 13:08 04/06/23 11:06   Cancer Antigen 19-9 <=35 U/mL 3 <2 <2 <2      Latest Reference Range & Units 03/29/21 09:10 06/28/21 09:44 04/12/22 11:57   Cancer Antigen 19-9 <=35 U/mL 2 [1] 3 [2] <2 [3]     Review Of Systems: Comprehensive 14-point ROS reviewed. Pertinent symptoms reviewed above per HPI.    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 She has a 30+ yr-old son. Works as . former smoker, quit 30 years ago. two glasses of wine per night, none since pancreatitis diagnosis     Allergies: none      Current Outpatient Medications:      acetaminophen (TYLENOL) 500 MG tablet, Take 2 tablets (1,000 mg) by mouth every 8 hours, Disp: 100 tablet, Rfl: 1     alcohol swab prep pads, Use to swab area of injection/avel as directed., Disp: 100 each, Rfl: 3     amylase-lipase-protease (CREON) 04466-49730 units CPEP per EC capsule, TAKE 4 CAPSULES WITH MEALS AND 2 CAPSULES WITH SNACKS, UP TO 16 CAPSULES PER DAY, Disp: 1440 capsule, Rfl: 3     blood glucose (NO BRAND SPECIFIED) test strip, Use to test blood sugar 2 times daily or as directed. To accompany: Blood Glucose Monitor Brands: per insurance., Disp: 100 strip, Rfl: 6     blood glucose (ONETOUCH VERIO IQ) test strip, Use to test blood sugar 4 times daily or as directed., Disp: 400 each, Rfl: 3     blood glucose calibration (NO BRAND SPECIFIED) solution, To accompany: Blood Glucose Monitor Brands: per insurance., Disp: 1 each, Rfl: 3     blood glucose monitoring (NO BRAND SPECIFIED) meter device kit, Use to test blood sugar 2 times daily or as directed. Preferred blood glucose meter OR supplies to accompany: Blood Glucose Monitor Brands: per insurance., Disp: 1 kit, Rfl: 0     clobetasol (TEMOVATE) 0.05 % GEL topical gel, Apply topically 2 times daily, Disp: 30 g, Rfl: 1     clobetasol (TEMOVATE) 0.05 % GEL topical gel, Apply topically 2 times daily, Disp: 15 g, Rfl:  "1     gabapentin (NEURONTIN) 100 MG capsule, Take 3 capsules (300 mg) by mouth 3 times daily, Disp: 270 capsule, Rfl: 3     metFORMIN (GLUCOPHAGE XR) 500 MG 24 hr tablet, Take 1 tablet (500 mg) by mouth 2 times daily (with meals), Disp: 180 tablet, Rfl: 3     OneTouch Delica Lancets 33G MISC, 4 lancets daily, Disp: 150 each, Rfl: 3     thin (NO BRAND SPECIFIED) lancets, Use with lanceting device. To accompany: Blood Glucose Monitor Brands: per insurance., Disp: 100 each, Rfl: 6  No current facility-administered medications for this visit.    Facility-Administered Medications Ordered in Other Visits:      heparin 100 UNIT/ML injection 5 mL, 5 mL, Intracatheter, Once, Art Guevara MD      Physical Exam:  In-person physical exam could not be performed today in context of a Virtual Visit. Observed physical assessments made today by visualizing the patient by video link:  Vitals - Patient Reported  Systolic (Patient Reported):  (unable to obtain BP)  Weight (Patient Reported): 59.9 kg (132 lb)  Height (Patient Reported): 165.1 cm (5' 5\")  BMI (Based on Pt Reported Ht/Wt): 21.97  Pain Score: Moderate Pain (4)  Pain Loc: Abdomen             General/Constitutional: Generally appears well, not acutely ill.  HEENT: no scleral icterus, not jaundiced.  Respiratory: no labored breathing.  Musculoskeletal: appears to have full range of motion and adequate physical strength.  Skin: no jaundice, discoloration or other notable lesions.  Neurological: no evidence of tremors.  Psychiatric: no evident anxiety; fully alert and oriented with fluent speech.      The rest of a comprehensive physical examination is deferred due to nature of video visits.    Laboratory/Imaging Studies  Prior to and including the day of this visit, I personally reviewed the recent imaging scans. I released the pertinent recent imaging results to [employer] in advance of this visit, and reviewed the summary verbatim and in lay language during today's " call.      ASSESSMENT/PLAN:  Mrs. Bangura is a 62 year old woman with resected pancreatic tail adenocarcinoma.  She had initial extensive and severe necrotizing pancreatitis upon initial diagnosis and hospitalization that delayed treatment.  We ultimately were able to begin neoadjuvant FOLFIRINOX for 4 cycles beginning in January.  By the time she had pancreatectomy by Dr. Byrd in mid April there was no evidence of residual carcinoma seen on operative pathology.  She completed subsequently 4 months of gemcitabine and Xeloda in the adjuvant setting.        She had a wedge resection of her lung-based carcinoid tumor that is 3 mm in diameter.  This finding was completely unrelated to her pancreatic adenocarcinoma, which was resected April 2018.    I congratulated her on being without evidence of disease 5 years out from her resection done with intent to cure.  I discussed that we could consider a transition to our Long-Term Cancer Survivorship Clinic due to the findings on the CT scan, which I think are more likely infectious or inflammatory in nature, and it may be due to oral secretions. In particular, I noted they were in the right middle lobe and may be due to causes of oral lichen planus and other gastroesophageal or oropharyngeal dysphagia issues.  It may be due to inhaled secretion or other issues.      Nonetheless, I think we can follow up with a CT scan one more time in 08-09/2023.  At that point, if those findings are not related to anything concerning for cancer recurrence, then transition to our Long-Term Survivorship Clinic.  She feels she would be more comfortable with that plan than immediate transition to the Long-Term Survivorship Clinic.  We will go ahead and do that accordingly.  I noted once again her  is within normal limits and unremarkable.  She has a 06/06 upper endoscopy already scheduled with GI, and she will continue to follow up with ENT as needed.  We will move forward  accordingly.          VIRTUAL VISIT - DETAILS:    I have reviewed the note as documented above. This accurately captures the substance of my conversation with the patient.    Date of call: April 10, 2023   Start of call: 8:52 am  End of call: 9:03 am    Provider location: [facility] (academic office)  Patient location: Home      Mode of Video Visit: Amwell             I spent 11 minutes in consultation, including history and discussion with the patient including review of recent lab values and radiologic imaging results.  An additional 13 minutes was spent on the day of the visit, including reviewing pertinent medical notes and documentation from other physicians and APPs who have evaluated and treated this patient, pertinent lab values, pathology and imaging results, personal review of radiologic images, discussing the case with referring providers and/or nurse coordinator team, post-visit orders, and all post-visit documentation.    Jovany Monk MD PhD

## 2023-04-10 NOTE — NURSING NOTE
Is the patient currently in the state of [place]? YES    Visit mode:VIDEO    If the visit is dropped, the patient can be reconnected by: VIDEO VISIT: Send to e-mail at: [email or web address]    Will anyone else be joining the visit? NO      How would you like to obtain your AVS? MyChart    Are changes needed to the allergy or medication list? NO  Patient verified medications and allergies are correct via eCheck-in. Patient confirms no changes at this time and/or since last reviewed by clinic staff.    Reason for visit: Kitty Bangura 62 year old female presents today for f/u on pancreatic cancer.  Ewa Sanches, Virtual Facilitator

## 2023-05-20 NOTE — TELEPHONE ENCOUNTER
REFERRAL INFORMATION:    Referring Provider:  Dr. Bret Coughlin     Referring Clinic:  [facility]     Reason for Visit/Diagnosis: Oropharyngeal dysphagia      FUTURE VISIT INFORMATION:    Appointment Date: 6/6/2023    Appointment Time: 11:20 AM      NOTES STATUS DETAILS   OFFICE NOTE from Referring Provider Internal 1/24/2023 Office visit with Dr. Coughlin      OFFICE NOTE from Other Specialist Internal 2/27/2023, 1/30/2023 Office visit with Sherita Domingo, REGGIE ([facility])     4/30/19 Office visit with Dr. Solange Mcgill ([facility])        HOSPITAL DISCHARGE SUMMARY/  ED VISITS N/A    OPERATIVE REPORT N/A    MEDICATION LIST Internal         ENDOSCOPY  Internal EGD: 5/8/18, 12/12/17, 12/4/17   COLONOSCOPY Internal 12/11/18   ERCP Internal 1/25/18   EUS Internal 1/25/18, 11/29/17   STOOL TESTING N/A    PERTINENT LABS Internal    PATHOLOGY REPORTS (RELATED) N/A    IMAGING (CT, MRI, EGD, MRCP, Small Bowel Follow Through/SBT, MR/CT Enterography) Internal CT Chest Abdomen Pelvis: 4/6/2023, 10/11/2022  XR Video Swallow: 12/9/2022

## 2023-05-24 ENCOUNTER — MYC MEDICAL ADVICE (OUTPATIENT)
Dept: GASTROENTEROLOGY | Facility: CLINIC | Age: 63
End: 2023-05-24
Payer: COMMERCIAL

## 2023-05-30 NOTE — TELEPHONE ENCOUNTER
Called to remind patient of their upcoming appointment with our GI clinic, on Tuesday 6/6/2023 at 11:20AM with Dr. Romero Winston. This appointment is scheduled as a video visit. You will receive a call approximately 30 minutes prior to check you in, you must be in [place] for this visit., if your appointment is virtual (video or telephone) you need to be in [place] for the visit. To reschedule or cancel patient to call [phone].    Evelin Martinez MA

## 2023-06-01 ENCOUNTER — HEALTH MAINTENANCE LETTER (OUTPATIENT)
Age: 63
End: 2023-06-01

## 2023-06-06 ENCOUNTER — PRE VISIT (OUTPATIENT)
Dept: GASTROENTEROLOGY | Facility: CLINIC | Age: 63
End: 2023-06-06

## 2023-06-06 ENCOUNTER — VIRTUAL VISIT (OUTPATIENT)
Dept: GASTROENTEROLOGY | Facility: CLINIC | Age: 63
End: 2023-06-06
Attending: OTOLARYNGOLOGY
Payer: COMMERCIAL

## 2023-06-06 VITALS — HEIGHT: 65 IN | BODY MASS INDEX: 22.16 KG/M2 | WEIGHT: 133 LBS

## 2023-06-06 DIAGNOSIS — R13.19 ESOPHAGEAL DYSPHAGIA: ICD-10-CM

## 2023-06-06 DIAGNOSIS — L43.8 ORAL LICHEN PLANUS: Primary | ICD-10-CM

## 2023-06-06 PROCEDURE — 99204 OFFICE O/P NEW MOD 45 MIN: CPT | Mod: VID | Performed by: INTERNAL MEDICINE

## 2023-06-06 NOTE — PATIENT INSTRUCTIONS
It was a pleasure taking care of you today.  I've included a brief summary of our discussion and care plan from today's visit below.  Please review this information with your primary care provider.  _______________________________________________________________________    My recommendations are summarized as follows:    Please schedule an upper endoscopy for your symptoms. We will be evaluating for common causes as well as esophageal lichen planus which is possible given your history of oral lichen planus.   Future testing could include high resolution esophageal manometry    To schedule endoscopic procedures you may call: [phone]  To schedule radiology tests you may call: [phone]  To schedule an ENT appointment you may call: [phone]    Please call my nurse care coordinator Chano ([phone]) or Amy ([phone]), with any questions or concerns.  If you were seen through the [facility] please feel free to reach out to Vanessa at [phone]   --    Return to GI Clinic in 4 months to review your progress.    _______________________________________________________________________    Who do I call with any questions after my visit?  Please be in touch if there are any further questions that arise following today's visit.  There are multiple ways to contact your gastroenterology care team.      During business hours, you may reach a Gastroenterology nurse at [phone] and choose option 3.       To schedule or reschedule an appointment, please call [phone].     You can always send a secure message through [employer].  [employer] messages are answered by your nurse or doctor typically within 24 hours.  Please allow extra time on weekends and holidays.      For urgent/emergent questions after business hours, you may reach the on-call GI Fellow by contacting the [facility] at [phone].     How will I get the results of any tests ordered?    You will receive all of your  results.  If you have signed up for [employer], any tests ordered at your visit will be available to you after your physician reviews them.  Typically this takes 1-2 weeks.  If there are urgent results that require a change in your care plan, your physician or nurse will call you to discuss the next steps.      What is [employer]?  Mama is a secure way for you to access all of your healthcare records from the [facility].  It is a web based computer program, so you can sign on to it from any location.  It also allows you to send secure messages to your care team.  I recommend signing up for emoquot access if you have not already done so and are comfortable with using a computer.      How to I schedule a follow-up visit?  If you did not schedule a follow-up visit today, please call [phone] to schedule a follow-up office visit.      If you feel you received exceptional care and are interested in supporting the clinical and research goals of Dr. Winston or the Division of Gastroenterology, Hepatology, and Nutrition please contact [email or web address] the [facility] to discuss opportunities to donate.    Sincerely,    Romero Winston DO   of Medicine  Director, Esophageal Disorders Program  Division of Gastroenterology, Hepatology, and Nutrition  [facility]

## 2023-06-06 NOTE — NURSING NOTE
Is the patient currently in the state of [place]? YES    Visit mode:VIDEO    If the visit is dropped, the patient can be reconnected by: VIDEO VISIT: Send to e-mail at: [email or web address]    Will anyone else be joining the visit? NO      How would you like to obtain your AVS? MyChart    Are changes needed to the allergy or medication list? NO    Reason for visit: Consult

## 2023-06-06 NOTE — PROGRESS NOTES
"Virtual Visit Details    Type of service:  Video Visit   Video Start Time: 11:09 AM  Video End Time:11:35 AM    Originating Location (pt. Location): Home    Distant Location (provider location):  Off-site  Platform used for Video Visit: [facility] Visit for: Kitty Bangura 1960   MRN: [id number]     Reason for Visit:  chief complaint    Referred by: [facility]  / [facility] / [facility] [id number]  Patient Care Team:  Kathy Jurado APRN CNP as PCP - General  Gagan Arrington RN as Clinic Care Coordinator  Vito Sanches MD as MD (Gastroenterology)  Jovany Monk MD as MD (Oncology)  Es Gallo MD as MD (INTERNAL MEDICINE - ENDOCRINOLOGY, DIABETES & METABOLISM)  Jovany Monk MD as Assigned Cancer Care Provider  Oxana Knox RN as Registered Nurse (Nurse)  Kj Orellana RN as Specialty Care Coordinator (Hematology & Oncology)  Enrique Ward MD as MD (Otolaryngology)  Romero Winston DO as MD (Gastroenterology)  Bret Coughlin MD as MD (Otolaryngology)  Kathy Jurado APRN CNP as Assigned PCP  Enrique Ward MD as Assigned Surgical Provider    History of Present Illness:   Kitty Bangura is a 62 year old female with oral lichen planus, cystic pancreatil tail adenocarcinoma, DM, trigeminal neuralgia, splenectomy who is presenting as a new patient in consultation at the request of Dr. Coughlin with a chief complaint of dysphagia.  ---------------------------------------------------------------  6/6/23  Kitty Bangura states when she underwent swallow study and prior to that she has felt some issues feeling as if food \"hangs out\" in the esophagus and backs up. It occurs occasionally, not all the time. This has been ongoing for 2-3 years. She will have a pain when drinking liquids on occasion. This is not temperature dependent. It is sharp and doesn't last long. It gradually dissipates. If she reclines she can feel and hear her esophagus " gurgle. This can happen once or a few times before it goes away. Chews very carefully and thoroughly. Symptoms of dysphagia occur with both solids and liquids. No known heartburn.     Oral lichen planus managed by avoiding acidic and spicy foods. If it flares, which is associated with stress, she will have an increase in oral ulcers and takes clobetasol swish and swallow.     See updated BEDQ and Eckardt score below: These patient reported outcomes are pertinent to the HPI and have personally been reviewed.    ---------------------------------------------------------------       Wt Readings from Last 5 Encounters:   06/06/23 60.3 kg (133 lb)   01/24/23 62 kg (136 lb 11.2 oz)   01/17/23 61.2 kg (135 lb)   11/22/22 61.2 kg (135 lb)   11/11/22 61.7 kg (136 lb)        Esophageal Questionnaire(s)    BEDQ Questionnaire      5/30/2023    12:42 PM   BEDQ Questionnaire: How Often Have You Had the Following?   Trouble eating solid food (meat, bread, vegetables) 0   Trouble eating soft foods (yogurt, jello, pudding) 0   Trouble swallowing liquids 0   Pain while swallowing 2   Coughing or choking while swallowing foods or liquids 0   Total Score: 2         5/30/2023    12:42 PM   BEDQ Questionnaire: Discomfort/Pain Ratings   Eating solid food (meat, bread, vegetables) 3   Eating soft foods (yogurt, jello, pudding) 2   Drinking liquid 3   Total Score: 8       Eckardt Questionnaire      5/30/2023    12:44 PM   Eckardt Questionnaire   Dysphagia 1   Regurgitation 1   Retrosternal Pain 1   Weight Loss (kg) 1   Total Score:  4       Promis 10 Questionnaire      5/30/2023    12:46 PM   PROMIS 10 FLOWSHEET DATA   In general, would you say your health is: 4   In general, would you say your quality of life is: 4   In general, how would you rate your physical health? 4   In general, how would you rate your mental health, including your mood and your ability to think? 4   In general, how would you rate your satisfaction with your social  activities and relationships? 3   In general, please rate how well you carry out your usual social activities and roles. (This includes activities at home, at work and in your community, and responsibilities as a parent, child, spouse, employee, friend, etc.) 3   To what extent are you able to carry out your everyday physical activities such as walking, climbing stairs, carrying groceries, or moving a chair? 5   In the past 7 days, how often have you been bothered by emotional problems such as feeling anxious, depressed, or irritable? 3   In the past 7 days, how would you rate your fatigue on average? 2   In the past 7 days, how would you rate your pain on average, where 0 means no pain, and 10 means worst imaginable pain? 2   Mental health question re-calculation - no clinical value 3   Physical health question re-calculation - no clinical value 4   Pain question re-calculation - no clinical value 4   Global Mental Health Score 14   Global Physical Health Score 17   PROMIS TOTAL - SUBSCORES 31     STUDIES & PROCEDURES:    EGD:   Date: 5/8/18  Impression: A plastic stent was found on the posterior wall of the gastric body        consistent with previous transgastric drainage of WON. Stent removal was        accomplished with a rat-toothed forceps.        A previously placed plastic stent was seen in the area of the papilla.        Stent removal was accomplished with a rat-toothed forceps.                                                                                     Impression:          - Pre-existing gastric stent, removed.                        - Plastic stent in the duodenum. Removed.   Recommendation:      - Return to referring physician as previously scheduled.                                                                                     Pathology Report:    Colonoscopy:  Date:  Impression:  Pathology Report:     EndoFLIP directed at the UES or LES (8cm (EF-325) balloon length or 16cm (EF-322)  balloon length):   Date:  8cm balloon  Balloon inflation Balloon pressure CSA (mm^2) DI (mm^2/mmHg) Dmin (mm) Compliance   20 (ladmark ID)        30        40        50           16cm balloon  Balloon inflation Balloon pressure CSA (mm^2) DI (mm^2/mmHg) Dmin (mm) Compliance   30 (ladmark ID)        40        50        60        70           High Resolution Manometry:  Date:  Impression:    PH/Impedance:  Date:  Impression:     Bravo:  48 or 96hr  Date:  Impression:    CT C/A/P:  Date: 4/6/23  Impression:  IMPRESSION:  1.  No evidence of recurrent or metastatic disease.  2.  A new small cluster of small pulmonary nodules in the right middle lobe is most likely infectious or inflammatory. Recommend attention on follow-up imaging.    Modified barium Esophagram:  Date: 12/9/23  Impression:    On the AP view, stasis of the barium tablet was noted in the mid  esophagus, which passes to the distal esophagus with subsequent liquid  rinse. The tablet eventually passes to the stomach with additional  multiple liquid rinse. There was symmetric bolus transit. Delayed  transit of the pudding consistency barium noted in distal esophagus.  No appreciable focal luminal narrowing.     Impression:   1. No aspiration or penetration with any of the administered  consistencies.  2. Please see the speech pathologist report for additional details.    Prior medical records were reviewed including, but not limited to, notes from referring providers, lab work, radiographic tests, and other diagnostic tests. Pertinent results were summarized above.     History     Past Medical History:   Diagnosis Date     Arthritis Post chemo    Suspected     Diabetes (H)      History of blood transfusion      History of total splenectomy 04/17/2018     Necrotizing pancreatitis      Necrotizing pancreatitis 12/9/2017     Pancreatic cancer (H)      Pancreatic mass 11/1/2017    On CT abd/pelvis: 3 cm heterogeneous mass within the tail of the pancreas. A few  calcifications are noted along the lateral margin of the mass.     Pneumonia     2016     PONV (postoperative nausea and vomiting)        Past Surgical History:   Procedure Laterality Date     ABDOMEN SURGERY  1983    Ruptured tubal pregnancies, additional surgeries followed     BACK SURGERY  2004    L5, S1 discectomy     BREAST SURGERY  2003    Breast reduction     COLONOSCOPY  2008     COLONOSCOPY N/A 12/11/2018    Procedure: colonoscopy;  Surgeon: Lobito Marte MD;  Location: [facility]     DAVINCI LOBECTOMY LUNG Left 7/28/2021    Procedure: Robot-assisted thoracoscopic left lower lobe wedge resection;  Surgeon: René Phillips MD;  Location: [facility]     ENDOSCOPIC RETROGRADE CHOLANGIOPANCREATOGRAM N/A 1/25/2018    Procedure: COMBINED ENDOSCOPIC RETROGRADE CHOLANGIOPANCREATOGRAPHY, PLACE TUBE/STENT;  Endoscopic retrograde cholangiopancreatogram with stent placement and cyst drainage bile ;  Surgeon: Vito Sanches MD;  Location: [facility]     ENDOSCOPIC ULTRASOUND LOWER GASTROINTESTIONAL TRACT (GI) N/A 1/25/2018    Procedure: ENDOSCOPIC ULTRASOUND LOWER GASTROINTESTIONAL TRACT (GI);  Endoscopic Ultrasound with guided Cyst-Gastrostomy;  Surgeon: González Metz MD;  Location: [facility]     ENDOSCOPIC ULTRASOUND, ESOPHAGOSCOPY, GASTROSCOPY, DUODENOSCOPY (EGD), NECROSECTOMY N/A 12/4/2017    Procedure: ENDOSCOPIC ULTRASOUND, ESOPHAGOSCOPY, GASTROSCOPY, DUODENOSCOPY (EGD), NECROSECTOMY;  Esophagogastroduodenoscopy, with  Necrosectomy and stents replacement  ;  Surgeon: González Metz MD;  Location: [facility]     ENDOSCOPIC ULTRASOUND, ESOPHAGOSCOPY, GASTROSCOPY, DUODENOSCOPY (EGD), NECROSECTOMY N/A 12/12/2017    Procedure: ENDOSCOPIC ULTRASOUND, ESOPHAGOSCOPY, GASTROSCOPY, DUODENOSCOPY (EGD), NECROSECTOMY;  Esophagogastroduodenoscopy with Necrosectomy, Balloon Dilation, stent removal X3 and Cystgastrostomy stent Placement X2;  Surgeon: Guru Cecilia Staples MD;  Location: [facility]     ESOPHAGOSCOPY,  GASTROSCOPY, DUODENOSCOPY (EGD), COMBINED N/A 2017    Procedure: COMBINED ENDOSCOPIC ULTRASOUND, ESOPHAGOSCOPY, GASTROSCOPY, DUODENOSCOPY (EGD), FINE NEEDLE ASPIRATE/BIOPSY;  Endoscopic Ultrasound with cystgastrostomy and fine needle aspiration ;  Surgeon: González Metz MD;  Location: [facility]     ESOPHAGOSCOPY, GASTROSCOPY, DUODENOSCOPY (EGD), COMBINED N/A 2018    Procedure: COMBINED ESOPHAGOSCOPY, GASTROSCOPY, DUODENOSCOPY (EGD);  EGD;  Surgeon: González Metz MD;  Location: [facility]     ESOPHAGOSCOPY, GASTROSCOPY, DUODENOSCOPY (EGD), COMBINED N/A 2018    Procedure: COMBINED ESOPHAGOSCOPY, GASTROSCOPY, DUODENOSCOPY (EGD), REMOVE FOREIGN BODY;;  Surgeon: González Metz MD;  Location:  GI     GYN SURGERY      Multiple ectopic pregnancies, reconnect,       INSERT PORT VASCULAR ACCESS Right 2018    Procedure: INSERT PORT VASCULAR ACCESS;  Chest Port Placement - right;  Surgeon: Chanda Lawson PA-C;  [facility]: [facility]     IR PORT REMOVAL RIGHT  2019     LAPAROSCOPY DIAGNOSTIC (GENERAL) N/A 2018    Procedure: LAPAROSCOPY DIAGNOSTIC (GENERAL);  Diagnostic Laparoscopy; Open Distal Pancreatectomy And Splenectomy, splenic flexure mobilization, cholecystectomy, intraoperative ultrasound;  Surgeon: Ja Byrd MD;  Location: [facility]     MAMMOPLASTY REDUCTION Bilateral 2006     PANCREATECTOMY, SPLENECTOMY N/A 2018    Procedure: PANCREATECTOMY, SPLENECTOMY;;  Surgeon: Ja Byrd MD;  Location[facility]     NM LAMNOTMY INCL W/DCMPRSN NRV ROOT 1 INTRSPC LUMBR      Description: Hemilaminectomy With Disc Removal One Lumbar Interspace;  Recorded: 2008;  Comments: Right L5-S1 with resultant loss of right patellar and achilles reflex     REMOVE PORT VASCULAR ACCESS Right 2019    Procedure: Right Port Removal;  Surgeon: Juan J Donaldson PA-C;  Location:  OR     ZZC  DELIVERY ONLY      Description:  Section;  Recorded: 12/10/2009;        Social History     Socioeconomic History     Marital status:      Spouse name: Not on file     Number of children: Not on file     Years of education: Not on file     Highest education level: Not on file   Occupational History     Not on file   Tobacco Use     Smoking status: Former     Packs/day: 0.50     Years: 5.00     Pack years: 2.50     Types: Cigarettes     Start date: 1974     Quit date:      Years since quittin.4     Smokeless tobacco: Never   Vaping Use     Vaping status: Never Used   Substance and Sexual Activity     Alcohol use: No     Alcohol/week: 21.0 standard drinks of alcohol     Comment: Now quit since Oct 31.  Moderate drinker in past     Drug use: No     Sexual activity: Not Currently     Partners: Male     Birth control/protection: Post-menopausal   Other Topics Concern     Parent/sibling w/ CABG, MI or angioplasty before 65F 55M? Yes     Comment: Brother   Social History Narrative    .  Lives with .  Works as a .     1 son.      No family history of bleeding, clotting disorders or complications with anesthesia.     Social Determinants of Health     Financial Resource Strain: Low Risk  (2021)    Overall Financial Resource Strain (CARDIA)      Difficulty of Paying Living Expenses: Not very hard   Food Insecurity: No Food Insecurity (2021)    Hunger Vital Sign      Worried About Running Out of Food in the Last Year: Never true      Ran Out of Food in the Last Year: Never true   Transportation Needs: No Transportation Needs (2021)    PRAPARE - Transportation      Lack of Transportation (Medical): No      Lack of Transportation (Non-Medical): No   Physical Activity: Sufficiently Active (2021)    Exercise Vital Sign      Days of Exercise per Week: 7 days      Minutes of Exercise per Session: 40 min   Stress: No Stress Concern Present (2021)    [employer] [place] [employer] - Occupational Stress Questionnaire       Feeling of Stress : Only a little   Social Connections: Unknown (11/7/2021)    Social Connection and Isolation Panel [NHANES]      Frequency of Communication with Friends and Family: Once a week      Frequency of Social Gatherings with Friends and Family: Once a week      Attends Christianity Services: Patient refused      Active Member of Clubs or Organizations: No      Attends Club or Organization Meetings: Not on file      Marital Status:    Intimate Partner Violence: Not on file   Housing Stability: Low Risk  (11/7/2021)    Housing Stability Vital Sign      Unable to Pay for Housing in the Last Year: No      Number of Places Lived in the Last Year: 1      Unstable Housing in the Last Year: No       Family History   Problem Relation Age of Onset     Heart Disease Father      Hyperlipidemia Father      Heart Disease Brother      Diabetes Mother      Hypertension Mother      Obesity Mother      Depression Mother      Diabetes Maternal Grandfather      Hypertension Maternal Grandfather      Obesity Maternal Grandfather      Diabetes Sister      Hypertension Sister      Depression Sister      Anxiety Disorder Sister      Obesity Sister      Hypertension Brother      Hyperlipidemia Brother      Heart Disease Brother      Breast Cancer Cousin      Breast Cancer Cousin      Breast Cancer Cousin      Colon Cancer Other      Other Cancer Other         Mesothelioma     Depression Sister      Anxiety Disorder Brother      Cancer Brother 64        colon cancer     Anxiety Disorder Son      Depression Son      Mental Illness Sister         Schizophrenia     Hypertension Sister      Obesity Maternal Grandmother      Obesity Sister      Diabetes Nephew      Hypertension Brother      Family history reviewed and edited as appropriate    Medications and Allergies:     Outpatient Encounter Medications as of 6/6/2023   Medication Sig Dispense Refill     acetaminophen (TYLENOL) 500 MG tablet Take 2 tablets (1,000 mg) by mouth  every 8 hours 100 tablet 1     alcohol swab prep pads Use to swab area of injection/avel as directed. 100 each 3     amylase-lipase-protease (CREON) 67513-51071 units CPEP per EC capsule TAKE 4 CAPSULES WITH MEALS AND 2 CAPSULES WITH SNACKS, UP TO 16 CAPSULES PER DAY 1440 capsule 3     blood glucose (NO BRAND SPECIFIED) test strip Use to test blood sugar 2 times daily or as directed. To accompany: Blood Glucose Monitor Brands: per insurance. 100 strip 6     blood glucose (ONETOUCH VERIO IQ) test strip Use to test blood sugar 4 times daily or as directed. 400 each 3     blood glucose calibration (NO BRAND SPECIFIED) solution To accompany: Blood Glucose Monitor Brands: per insurance. 1 each 3     blood glucose monitoring (NO BRAND SPECIFIED) meter device kit Use to test blood sugar 2 times daily or as directed. Preferred blood glucose meter OR supplies to accompany: Blood Glucose Monitor Brands: per insurance. 1 kit 0     clobetasol (TEMOVATE) 0.05 % GEL topical gel Apply topically 2 times daily 30 g 1     clobetasol (TEMOVATE) 0.05 % GEL topical gel Apply topically 2 times daily 15 g 1     gabapentin (NEURONTIN) 100 MG capsule Take 3 capsules (300 mg) by mouth 3 times daily 270 capsule 3     metFORMIN (GLUCOPHAGE XR) 500 MG 24 hr tablet Take 1 tablet (500 mg) by mouth 2 times daily (with meals) 180 tablet 3     OneTouch Delica Lancets 33G MISC 4 lancets daily 150 each 3     thin (NO BRAND SPECIFIED) lancets Use with lanceting device. To accompany: Blood Glucose Monitor Brands: per insurance. 100 each 6     Facility-Administered Encounter Medications as of 6/6/2023   Medication Dose Route Frequency Provider Last Rate Last Admin     heparin 100 UNIT/ML injection 5 mL  5 mL Intracatheter Once Art Guevara MD            No Known Allergies     Review of systems:  A full 10 point review of systems was obtained and was negative except for the pertinent positives and negatives stated within the HPI.    Objective  "Findings:   Physical Exam:    Constitutional: Ht 1.651 m (5' 5\")   Wt 60.3 kg (133 lb)   BMI 22.13 kg/m    General: Alert, cooperative, no distress, well-appearing  Head: Atraumatic, normocephalic, no obvious abnormalities   Eyes: EOMI, Sclera anicteric, no obvious conjunctival hemorrhage   Nose: Nares normal, no obvious malformation, no obvious rhinorrhea   Respiratory: normal appearing respirations no cough  Musculoskeletal: Range of motion intact, no obvious strength deficit  Skin: No jaundice, no obvious rash  Neurologic: AAOx3, no obvious neurologic abnormality  Psychiatric: Normal Affect, appropriate mood  Extremities: No obvious edema, no obvious malformation     Labs, Radiology, Pathology     Lab Results   Component Value Date    WBC 7.6 11/11/2022    WBC 10.5 08/06/2021    WBC 17.6 (H) 07/28/2021    HGB 13.1 11/11/2022    HGB 11.8 08/06/2021    HGB 11.4 (L) 07/28/2021     11/11/2022     (H) 08/06/2021     07/28/2021    CHOL 165 11/11/2022    CHOL 171 11/09/2021    CHOL 191 10/23/2020    TRIG 78 11/11/2022    TRIG 93 11/09/2021    TRIG 104 10/23/2020    HDL 70 11/11/2022    HDL 73 11/09/2021    HDL 87 10/23/2020    ALT 13 11/11/2022    ALT 30 08/06/2021    ALT 21 06/28/2021    AST 24 11/11/2022    AST 16 08/06/2021    AST 20 06/28/2021     11/11/2022     08/06/2021     07/28/2021    BUN 17.7 11/11/2022    BUN 14 08/06/2021    BUN 17 07/28/2021    CO2 27 11/11/2022    CO2 28 08/06/2021    CO2 29 07/28/2021    TSH 2.05 11/11/2022    TSH 1.48 04/30/2019    INR 1.01 07/08/2019    INR 1.24 (H) 04/17/2018    INR 0.99 04/02/2018        Liver Function Studies -   Recent Labs   Lab Test 11/11/22  1019   PROTTOTAL 7.2   ALBUMIN 4.3   BILITOTAL 0.5   ALKPHOS 76   AST 24   ALT 13          Patient Active Problem List    Diagnosis Date Noted     Esophageal dysphagia 06/06/2023     Priority: Medium     Alcohol dependence (H) 03/28/2023     Priority: Medium     Leiomyoma of uterus, " unspecified 07/26/2021     Priority: Medium     Formatting of this note might be different from the original.  Created by Conversion       s/p LLL wedge - 7/28 07/21/2021     Priority: Medium     Added automatically from request for surgery [id number]       Oral lichen planus 12/01/2019     Priority: Medium     Drug-induced polyneuropathy (H) 10/14/2019     Priority: Medium     Type 2 diabetes mellitus without complication, without long-term current use of insulin (H) 10/05/2018     Priority: Medium     Graduated from Endocrine Clinic 7/22/2019       Anemia 04/18/2018     Priority: Medium     Thrombocytopenia (H) 04/18/2018     Priority: Medium     Asplenia after surgical procedure 04/18/2018     Priority: Medium     Pancreatic adenocarcinoma (H) 11/29/2017     Priority: Medium     Leukocytosis 11/01/2017     Priority: Medium     Fatty liver 11/01/2017     Priority: Medium     Seen on US 11/01/2017        Assessment and Plan   Assessment:    Kitty Bangura is a 62 year old female with oral lichen planus, cystic pancreatil tail adenocarcinoma, DM, trigeminal neuralgia, splenectomy who is presenting as a new patient in consultation at the request of Dr. Coughlin with a chief complaint of dysphagia.    The patient was seen in video telemedicine consultation regarding her symptoms of intermittent dysphagia to both solids and liquids.  Her symptoms may be related to an underlying motility abnormality or possibly reflux esophagitis.  Other more rare conditions such as with esophageal lichen planus could be at play especially in light of her history of oral esophageal lichen planus.  She is not on any systemic therapy for that at this time.    I recommended that the patient undergo an upper endoscopy with biopsies to evaluate for esophageal lichen planus.  Dilation can be performed if stricturing or narrow caliber is identified.  If esophageal lichen planus is identified we will likely initiate therapy with budesonide.   Alternatively, if she has erosive esophagitis PPI will be started.    Future testing with high-resolution esophageal manometry can be performed if endoscopic evaluation with biopsies unrevealing.    1. Oral lichen planus    2. Esophageal dysphagia       Orders Placed This Encounter   Procedures     Adult GI  Referral - Procedure Only      Plan:  1. Please schedule an upper endoscopy for your symptoms. We will be evaluating for common causes as well as esophageal lichen planus which is possible given your history of oral lichen planus.   2. Future testing could include high resolution esophageal manometry    Follow up plan:   Return to clinic 4 months and as needed.    The risks and benefits of my recommendations, as well as other treatment options were discussed with the patient and any available family today. All questions were answered.     o Follow up: As planned above. Today, I personally spent 26 minutes in direct face to face time with the patient, of which greater than 50% of the time was spent in patient education and counseling as described above. Approximately 12 minutes were spent on indirect care associated with the patient's consultation including but not limited to review of: patient medical records to date, clinic visits, hospital records, lab results, imaging studies, procedural documentation, and coordinating care with other providers. The findings from this review are summarized in the above note. All of the above accounted for a cumulative time of 38 minutes and was performed on the date of service.     The patient verbalized understanding of the plan and was appreciative for the time spent and information provided during the office visit.     Author:   Romero Winston DO   of Medicine  Director, Esophageal Disorders Program  Division of Gastroenterology, Hepatology, and Nutrition  [facility]    Dr. Winston speaks for [employer] regarding dupilumab and  has participated in advisory boards for the medication. When discussing the medication, patients were informed of Dr. Winston's role and potential conflict of interest.     Documentation assisted by voice recognition and documentation system.

## 2023-06-06 NOTE — LETTER
"    6/6/2023         RE: Kitty Bangura  [address] [place] [id number]        Dear Colleague,    Thank you for referring your patient, Kitty Bangura, to the [facility] [place]. Please see a copy of my visit note below.    Gastroenterology Visit for: Kitty Bangura 1960   MRN: [id number]     Reason for Visit:  chief complaint    Referred by: Ced  / [facility] / [facility] [id number]  Patient Care Team:  Kathy Jurado APRN CNP as PCP - General  Gagan Arrington RN as Clinic Care Coordinator  Vito Sanches MD as MD (Gastroenterology)  Jovany Monk MD as MD (Oncology)  Es Gallo MD as MD (INTERNAL MEDICINE - ENDOCRINOLOGY, DIABETES & METABOLISM)  Jovany Monk MD as Assigned Cancer Care Provider  Oxana Knox RN as Registered Nurse (Nurse)  Kj Orellana RN as Specialty Care Coordinator (Hematology & Oncology)  Enrique Ward MD as MD (Otolaryngology)  Romero Winston DO as MD (Gastroenterology)  Bret Coughlin MD as MD (Otolaryngology)  Kathy Jurado APRN CNP as Assigned PCP  Enrique Ward MD as Assigned Surgical Provider    History of Present Illness:   Kitty Bangura is a 62 year old female with oral lichen planus, cystic pancreatil tail adenocarcinoma, DM, trigeminal neuralgia, splenectomy who is presenting as a new patient in consultation at the request of Dr. Coughlin with a chief complaint of dysphagia.  ---------------------------------------------------------------  6/6/23  Kitty Bangura states when she underwent swallow study and prior to that she has felt some issues feeling as if food \"hangs out\" in the esophagus and backs up. It occurs occasionally, not all the time. This has been ongoing for 2-3 years. She will have a pain when drinking liquids on occasion. This is not temperature dependent. It is sharp and doesn't last long. It gradually dissipates. If she reclines she can " feel and hear her esophagus gurgle. This can happen once or a few times before it goes away. Chews very carefully and thoroughly. Symptoms of dysphagia occur with both solids and liquids. No known heartburn.     Oral lichen planus managed by avoiding acidic and spicy foods. If it flares, which is associated with stress, she will have an increase in oral ulcers and takes clobetasol swish and swallow.     See updated BEDQ and Eckardt score below: These patient reported outcomes are pertinent to the HPI and have personally been reviewed.    ---------------------------------------------------------------       Wt Readings from Last 5 Encounters:   06/06/23 60.3 kg (133 lb)   01/24/23 62 kg (136 lb 11.2 oz)   01/17/23 61.2 kg (135 lb)   11/22/22 61.2 kg (135 lb)   11/11/22 61.7 kg (136 lb)        Esophageal Questionnaire(s)    BEDQ Questionnaire      5/30/2023    12:42 PM   BEDQ Questionnaire: How Often Have You Had the Following?   Trouble eating solid food (meat, bread, vegetables) 0   Trouble eating soft foods (yogurt, jello, pudding) 0   Trouble swallowing liquids 0   Pain while swallowing 2   Coughing or choking while swallowing foods or liquids 0   Total Score: 2         5/30/2023    12:42 PM   BEDQ Questionnaire: Discomfort/Pain Ratings   Eating solid food (meat, bread, vegetables) 3   Eating soft foods (yogurt, jello, pudding) 2   Drinking liquid 3   Total Score: 8       Eckardt Questionnaire      5/30/2023    12:44 PM   Eckardt Questionnaire   Dysphagia 1   Regurgitation 1   Retrosternal Pain 1   Weight Loss (kg) 1   Total Score:  4       Promis 10 Questionnaire      5/30/2023    12:46 PM   PROMIS 10 FLOWSHEET DATA   In general, would you say your health is: 4   In general, would you say your quality of life is: 4   In general, how would you rate your physical health? 4   In general, how would you rate your mental health, including your mood and your ability to think? 4   In general, how would you rate your  satisfaction with your social activities and relationships? 3   In general, please rate how well you carry out your usual social activities and roles. (This includes activities at home, at work and in your community, and responsibilities as a parent, child, spouse, employee, friend, etc.) 3   To what extent are you able to carry out your everyday physical activities such as walking, climbing stairs, carrying groceries, or moving a chair? 5   In the past 7 days, how often have you been bothered by emotional problems such as feeling anxious, depressed, or irritable? 3   In the past 7 days, how would you rate your fatigue on average? 2   In the past 7 days, how would you rate your pain on average, where 0 means no pain, and 10 means worst imaginable pain? 2   Mental health question re-calculation - no clinical value 3   Physical health question re-calculation - no clinical value 4   Pain question re-calculation - no clinical value 4   Global Mental Health Score 14   Global Physical Health Score 17   PROMIS TOTAL - SUBSCORES 31     STUDIES & PROCEDURES:    EGD:   Date: 5/8/18  Impression: A plastic stent was found on the posterior wall of the gastric body        consistent with previous transgastric drainage of WON. Stent removal was        accomplished with a rat-toothed forceps.        A previously placed plastic stent was seen in the area of the papilla.        Stent removal was accomplished with a rat-toothed forceps.                                                                                     Impression:          - Pre-existing gastric stent, removed.                        - Plastic stent in the duodenum. Removed.   Recommendation:      - Return to referring physician as previously scheduled.                                                                                     Pathology Report:    Colonoscopy:  Date:  Impression:  Pathology Report:     EndoFLIP directed at the UES or LES (8cm (EF-325) balloon  length or 16cm (EF-322) balloon length):   Date:  8cm balloon  Balloon inflation Balloon pressure CSA (mm^2) DI (mm^2/mmHg) Dmin (mm) Compliance   20 (ladmark ID)        30        40        50           16cm balloon  Balloon inflation Balloon pressure CSA (mm^2) DI (mm^2/mmHg) Dmin (mm) Compliance   30 (ladmark ID)        40        50        60        70           High Resolution Manometry:  Date:  Impression:    PH/Impedance:  Date:  Impression:     Bravo:  48 or 96hr  Date:  Impression:    CT C/A/P:  Date: 4/6/23  Impression:  IMPRESSION:  1.  No evidence of recurrent or metastatic disease.  2.  A new small cluster of small pulmonary nodules in the right middle lobe is most likely infectious or inflammatory. Recommend attention on follow-up imaging.    Modified barium Esophagram:  Date: 12/9/23  Impression:    On the AP view, stasis of the barium tablet was noted in the mid  esophagus, which passes to the distal esophagus with subsequent liquid  rinse. The tablet eventually passes to the stomach with additional  multiple liquid rinse. There was symmetric bolus transit. Delayed  transit of the pudding consistency barium noted in distal esophagus.  No appreciable focal luminal narrowing.     Impression:   1. No aspiration or penetration with any of the administered  consistencies.  2. Please see the speech pathologist report for additional details.    Prior medical records were reviewed including, but not limited to, notes from referring providers, lab work, radiographic tests, and other diagnostic tests. Pertinent results were summarized above.     History     Past Medical History:   Diagnosis Date    Arthritis Post chemo    Suspected    Diabetes (H)     History of blood transfusion     History of total splenectomy 04/17/2018    Necrotizing pancreatitis     Necrotizing pancreatitis 12/9/2017    Pancreatic cancer (H)     Pancreatic mass 11/1/2017    On CT abd/pelvis: 3 cm heterogeneous mass within the tail of the  pancreas. A few calcifications are noted along the lateral margin of the mass.    Pneumonia     2016    PONV (postoperative nausea and vomiting)        Past Surgical History:   Procedure Laterality Date    ABDOMEN SURGERY  1983    Ruptured tubal pregnancies, additional surgeries followed    BACK SURGERY  2004    L5, S1 discectomy    BREAST SURGERY  2003    Breast reduction    COLONOSCOPY  2008    COLONOSCOPY N/A 12/11/2018    Procedure: colonoscopy;  Surgeon: Lobito Marte MD;  Location: [facility]    DAVINCI LOBECTOMY LUNG Left 7/28/2021    Procedure: Robot-assisted thoracoscopic left lower lobe wedge resection;  Surgeon: René Phillips MD;  Location: [facility]    ENDOSCOPIC RETROGRADE CHOLANGIOPANCREATOGRAM N/A 1/25/2018    Procedure: COMBINED ENDOSCOPIC RETROGRADE CHOLANGIOPANCREATOGRAPHY, PLACE TUBE/STENT;  Endoscopic retrograde cholangiopancreatogram with stent placement and cyst drainage bile ;  Surgeon: Vito Sanches MD;  Location: [facility]    ENDOSCOPIC ULTRASOUND LOWER GASTROINTESTIONAL TRACT (GI) N/A 1/25/2018    Procedure: ENDOSCOPIC ULTRASOUND LOWER GASTROINTESTIONAL TRACT (GI);  Endoscopic Ultrasound with guided Cyst-Gastrostomy;  Surgeon: González Metz MD;  Location: [facility]    ENDOSCOPIC ULTRASOUND, ESOPHAGOSCOPY, GASTROSCOPY, DUODENOSCOPY (EGD), NECROSECTOMY N/A 12/4/2017    Procedure: ENDOSCOPIC ULTRASOUND, ESOPHAGOSCOPY, GASTROSCOPY, DUODENOSCOPY (EGD), NECROSECTOMY;  Esophagogastroduodenoscopy, with  Necrosectomy and stents replacement  ;  Surgeon: González Metz MD;  Location: [facility]    ENDOSCOPIC ULTRASOUND, ESOPHAGOSCOPY, GASTROSCOPY, DUODENOSCOPY (EGD), NECROSECTOMY N/A 12/12/2017    Procedure: ENDOSCOPIC ULTRASOUND, ESOPHAGOSCOPY, GASTROSCOPY, DUODENOSCOPY (EGD), NECROSECTOMY;  Esophagogastroduodenoscopy with Necrosectomy, Balloon Dilation, stent removal X3 and Cystgastrostomy stent Placement X2;  Surgeon: Guru Cecilia Staples MD;  Location: [facility]    ESOPHAGOSCOPY,  GASTROSCOPY, DUODENOSCOPY (EGD), COMBINED N/A 2017    Procedure: COMBINED ENDOSCOPIC ULTRASOUND, ESOPHAGOSCOPY, GASTROSCOPY, DUODENOSCOPY (EGD), FINE NEEDLE ASPIRATE/BIOPSY;  Endoscopic Ultrasound with cystgastrostomy and fine needle aspiration ;  Surgeon: González Metz MD;  [facility]    ESOPHAGOSCOPY, GASTROSCOPY, DUODENOSCOPY (EGD), COMBINED N/A 2018    Procedure: COMBINED ESOPHAGOSCOPY, GASTROSCOPY, DUODENOSCOPY (EGD);  EGD;  Surgeon: González Metz MD;  [facility]: [facility]    ESOPHAGOSCOPY, GASTROSCOPY, DUODENOSCOPY (EGD), COMBINED N/A 2018    Procedure: COMBINED ESOPHAGOSCOPY, GASTROSCOPY, DUODENOSCOPY (EGD), REMOVE FOREIGN BODY;;  Surgeon: González Metz MD;  Location:  GI    GYN SURGERY      Multiple ectopic pregnancies, reconnect,      INSERT PORT VASCULAR ACCESS Right 2018    Procedure: INSERT PORT VASCULAR ACCESS;  Chest Port Placement - right;  Surgeon: Chanda Lawson PA-C;  [facility]    IR PORT REMOVAL RIGHT  2019    LAPAROSCOPY DIAGNOSTIC (GENERAL) N/A 2018    Procedure: LAPAROSCOPY DIAGNOSTIC (GENERAL);  Diagnostic Laparoscopy; Open Distal Pancreatectomy And Splenectomy, splenic flexure mobilization, cholecystectomy, intraoperative ultrasound;  Surgeon: Ja Byrd MD;  Location: [facility]    MAMMOPLASTY REDUCTION Bilateral 2006    PANCREATECTOMY, SPLENECTOMY N/A 2018    Procedure: PANCREATECTOMY, SPLENECTOMY;;  Surgeon: Ja Byrd MD;  [facility]    GA LAMNOTMY INCL W/DCMPRSN NRV ROOT 1 INTRSPC LUMBR      Description: Hemilaminectomy With Disc Removal One Lumbar Interspace;  Recorded: 2008;  Comments: Right L5-S1 with resultant loss of right patellar and achilles reflex    REMOVE PORT VASCULAR ACCESS Right 2019    Procedure: Right Port Removal;  Surgeon: Juan J Donaldson PA-C;  Location: [facility]    ZZC  DELIVERY ONLY      Description:  Section;  Recorded: 12/10/2009;       Social  History     Socioeconomic History    Marital status:      Spouse name: Not on file    Number of children: Not on file    Years of education: Not on file    Highest education level: Not on file   Occupational History    Not on file   Tobacco Use    Smoking status: Former     Packs/day: 0.50     Years: 5.00     Pack years: 2.50     Types: Cigarettes     Start date: 1974     Quit date:      Years since quittin.4    Smokeless tobacco: Never   Vaping Use    Vaping status: Never Used   Substance and Sexual Activity    Alcohol use: No     Alcohol/week: 21.0 standard drinks of alcohol     Comment: Now quit since Oct 31.  Moderate drinker in past    Drug use: No    Sexual activity: Not Currently     Partners: Male     Birth control/protection: Post-menopausal   Other Topics Concern    Parent/sibling w/ CABG, MI or angioplasty before 65F 55M? Yes     Comment: Brother   Social History Narrative    .  Lives with .  Works as a .     1 son.      No family history of bleeding, clotting disorders or complications with anesthesia.     Social Determinants of Health     Financial Resource Strain: Low Risk  (2021)    Overall Financial Resource Strain (CARDIA)     Difficulty of Paying Living Expenses: Not very hard   Food Insecurity: No Food Insecurity (2021)    Hunger Vital Sign     Worried About Running Out of Food in the Last Year: Never true     Ran Out of Food in the Last Year: Never true   Transportation Needs: No Transportation Needs (2021)    PRAPARE - Transportation     Lack of Transportation (Medical): No     Lack of Transportation (Non-Medical): No   Physical Activity: Sufficiently Active (2021)    Exercise Vital Sign     Days of Exercise per Week: 7 days     Minutes of Exercise per Session: 40 min   Stress: No Stress Concern Present (2021)    [place] [employer] - Occupational Stress Questionnaire     Feeling of Stress : Only a little    Social Connections: Unknown (11/7/2021)    Social Connection and Isolation Panel [NHANES]     Frequency of Communication with Friends and Family: Once a week     Frequency of Social Gatherings with Friends and Family: Once a week     Attends Taoist Services: Patient refused     Active Member of Clubs or Organizations: No     Attends Club or Organization Meetings: Not on file     Marital Status:    Intimate Partner Violence: Not on file   Housing Stability: Low Risk  (11/7/2021)    Housing Stability Vital Sign     Unable to Pay for Housing in the Last Year: No     Number of Places Lived in the Last Year: 1     Unstable Housing in the Last Year: No       Family History   Problem Relation Age of Onset    Heart Disease Father     Hyperlipidemia Father     Heart Disease Brother     Diabetes Mother     Hypertension Mother     Obesity Mother     Depression Mother     Diabetes Maternal Grandfather     Hypertension Maternal Grandfather     Obesity Maternal Grandfather     Diabetes Sister     Hypertension Sister     Depression Sister     Anxiety Disorder Sister     Obesity Sister     Hypertension Brother     Hyperlipidemia Brother     Heart Disease Brother     Breast Cancer Cousin     Breast Cancer Cousin     Breast Cancer Cousin     Colon Cancer Other     Other Cancer Other         Mesothelioma    Depression Sister     Anxiety Disorder Brother     Cancer Brother 64        colon cancer    Anxiety Disorder Son     Depression Son     Mental Illness Sister         Schizophrenia    Hypertension Sister     Obesity Maternal Grandmother     Obesity Sister     Diabetes Nephew     Hypertension Brother      Family history reviewed and edited as appropriate    Medications and Allergies:     Outpatient Encounter Medications as of 6/6/2023   Medication Sig Dispense Refill    acetaminophen (TYLENOL) 500 MG tablet Take 2 tablets (1,000 mg) by mouth every 8 hours 100 tablet 1    alcohol swab prep pads Use to swab area of  "injection/avel as directed. 100 each 3    amylase-lipase-protease (CREON) 69507-23739 units CPEP per EC capsule TAKE 4 CAPSULES WITH MEALS AND 2 CAPSULES WITH SNACKS, UP TO 16 CAPSULES PER DAY 1440 capsule 3    blood glucose (NO BRAND SPECIFIED) test strip Use to test blood sugar 2 times daily or as directed. To accompany: Blood Glucose Monitor Brands: per insurance. 100 strip 6    blood glucose (ONETOUCH VERIO IQ) test strip Use to test blood sugar 4 times daily or as directed. 400 each 3    blood glucose calibration (NO BRAND SPECIFIED) solution To accompany: Blood Glucose Monitor Brands: per insurance. 1 each 3    blood glucose monitoring (NO BRAND SPECIFIED) meter device kit Use to test blood sugar 2 times daily or as directed. Preferred blood glucose meter OR supplies to accompany: Blood Glucose Monitor Brands: per insurance. 1 kit 0    clobetasol (TEMOVATE) 0.05 % GEL topical gel Apply topically 2 times daily 30 g 1    clobetasol (TEMOVATE) 0.05 % GEL topical gel Apply topically 2 times daily 15 g 1    gabapentin (NEURONTIN) 100 MG capsule Take 3 capsules (300 mg) by mouth 3 times daily 270 capsule 3    metFORMIN (GLUCOPHAGE XR) 500 MG 24 hr tablet Take 1 tablet (500 mg) by mouth 2 times daily (with meals) 180 tablet 3    OneTouch Delica Lancets 33G MISC 4 lancets daily 150 each 3    thin (NO BRAND SPECIFIED) lancets Use with lanceting device. To accompany: Blood Glucose Monitor Brands: per insurance. 100 each 6     Facility-Administered Encounter Medications as of 6/6/2023   Medication Dose Route Frequency Provider Last Rate Last Admin    heparin 100 UNIT/ML injection 5 mL  5 mL Intracatheter Once Art Guevara MD            No Known Allergies     Review of systems:  A full 10 point review of systems was obtained and was negative except for the pertinent positives and negatives stated within the HPI.    Objective Findings:   Physical Exam:    Constitutional: Ht 1.651 m (5' 5\")   Wt 60.3 kg (133 " lb)   BMI 22.13 kg/m    General: Alert, cooperative, no distress, well-appearing  Head: Atraumatic, normocephalic, no obvious abnormalities   Eyes: EOMI, Sclera anicteric, no obvious conjunctival hemorrhage   Nose: Nares normal, no obvious malformation, no obvious rhinorrhea   Respiratory: normal appearing respirations no cough  Musculoskeletal: Range of motion intact, no obvious strength deficit  Skin: No jaundice, no obvious rash  Neurologic: AAOx3, no obvious neurologic abnormality  Psychiatric: Normal Affect, appropriate mood  Extremities: No obvious edema, no obvious malformation     Labs, Radiology, Pathology     Lab Results   Component Value Date    WBC 7.6 11/11/2022    WBC 10.5 08/06/2021    WBC 17.6 (H) 07/28/2021    HGB 13.1 11/11/2022    HGB 11.8 08/06/2021    HGB 11.4 (L) 07/28/2021     11/11/2022     (H) 08/06/2021     07/28/2021    CHOL 165 11/11/2022    CHOL 171 11/09/2021    CHOL 191 10/23/2020    TRIG 78 11/11/2022    TRIG 93 11/09/2021    TRIG 104 10/23/2020    HDL 70 11/11/2022    HDL 73 11/09/2021    HDL 87 10/23/2020    ALT 13 11/11/2022    ALT 30 08/06/2021    ALT 21 06/28/2021    AST 24 11/11/2022    AST 16 08/06/2021    AST 20 06/28/2021     11/11/2022     08/06/2021     07/28/2021    BUN 17.7 11/11/2022    BUN 14 08/06/2021    BUN 17 07/28/2021    CO2 27 11/11/2022    CO2 28 08/06/2021    CO2 29 07/28/2021    TSH 2.05 11/11/2022    TSH 1.48 04/30/2019    INR 1.01 07/08/2019    INR 1.24 (H) 04/17/2018    INR 0.99 04/02/2018        Liver Function Studies -   Recent Labs   Lab Test 11/11/22  1019   PROTTOTAL 7.2   ALBUMIN 4.3   BILITOTAL 0.5   ALKPHOS 76   AST 24   ALT 13          Patient Active Problem List    Diagnosis Date Noted    Esophageal dysphagia 06/06/2023     Priority: Medium    Alcohol dependence (H) 03/28/2023     Priority: Medium    Leiomyoma of uterus, unspecified 07/26/2021     Priority: Medium     Formatting of this note might be  different from the original.  Created by Conversion      s/p LLL wedge - 7/28 07/21/2021     Priority: Medium     Added automatically from request for surgery [id number]      Oral lichen planus 12/01/2019     Priority: Medium    Drug-induced polyneuropathy (H) 10/14/2019     Priority: Medium    Type 2 diabetes mellitus without complication, without long-term current use of insulin (H) 10/05/2018     Priority: Medium     Graduated from Endocrine Clinic 7/22/2019      Anemia 04/18/2018     Priority: Medium    Thrombocytopenia (H) 04/18/2018     Priority: Medium    Asplenia after surgical procedure 04/18/2018     Priority: Medium    Pancreatic adenocarcinoma (H) 11/29/2017     Priority: Medium    Leukocytosis 11/01/2017     Priority: Medium    Fatty liver 11/01/2017     Priority: Medium     Seen on US 11/01/2017        Assessment and Plan   Assessment:    Kitty Bangura is a 62 year old female with oral lichen planus, cystic pancreatil tail adenocarcinoma, DM, trigeminal neuralgia, splenectomy who is presenting as a new patient in consultation at the request of Dr. Coughlin with a chief complaint of dysphagia.    The patient was seen in video telemedicine consultation regarding her symptoms of intermittent dysphagia to both solids and liquids.  Her symptoms may be related to an underlying motility abnormality or possibly reflux esophagitis.  Other more rare conditions such as with esophageal lichen planus could be at play especially in light of her history of oral esophageal lichen planus.  She is not on any systemic therapy for that at this time.    I recommended that the patient undergo an upper endoscopy with biopsies to evaluate for esophageal lichen planus.  Dilation can be performed if stricturing or narrow caliber is identified.  If esophageal lichen planus is identified we will likely initiate therapy with budesonide.  Alternatively, if she has erosive esophagitis PPI will be started.    Future testing with  high-resolution esophageal manometry can be performed if endoscopic evaluation with biopsies unrevealing.    1. Oral lichen planus    2. Esophageal dysphagia       Orders Placed This Encounter   Procedures    Adult GI  Referral - Procedure Only      Plan:  Please schedule an upper endoscopy for your symptoms. We will be evaluating for common causes as well as esophageal lichen planus which is possible given your history of oral lichen planus.   Future testing could include high resolution esophageal manometry    Follow up plan:   Return to clinic 4 months and as needed.    The risks and benefits of my recommendations, as well as other treatment options were discussed with the patient and any available family today. All questions were answered.     Follow up: As planned above. Today, I personally spent 26 minutes in direct face to face time with the patient, of which greater than 50% of the time was spent in patient education and counseling as described above. Approximately 12 minutes were spent on indirect care associated with the patient's consultation including but not limited to review of: patient medical records to date, clinic visits, hospital records, lab results, imaging studies, procedural documentation, and coordinating care with other providers. The findings from this review are summarized in the above note. All of the above accounted for a cumulative time of 38 minutes and was performed on the date of service.     The patient verbalized understanding of the plan and was appreciative for the time spent and information provided during the office visit.     Author:   Romero Winston DO   of Medicine  Director, Esophageal Disorders Program  Division of Gastroenterology, Hepatology, and Nutrition  [facility]    Dr. Winston speaks for [employer] regarding dupilumab and has participated in advisory boards for the medication. When discussing the medication, patients were  informed of Dr. Winston's role and potential conflict of interest.     Documentation assisted by voice recognition and documentation system.

## 2023-06-07 ENCOUNTER — TELEPHONE (OUTPATIENT)
Dept: GASTROENTEROLOGY | Facility: CLINIC | Age: 63
End: 2023-06-07
Payer: COMMERCIAL

## 2023-06-07 NOTE — TELEPHONE ENCOUNTER
"Endoscopy Scheduling Screen    Have you had a positive Covid test in the last 14 days?  No    Are you active on MyChart?   Yes    What insurance is in the chart?  Other:  ARE/ MEDICARE    Ordering/Referring Provider: NAEEM   (If ordering provider performs procedure, schedule with ordering provider unless otherwise instructed. )    BMI: Estimated body mass index is 22.13 kg/m  as calculated from the following:    Height as of 6/6/23: 1.651 m (5' 5\").    Weight as of 6/6/23: 60.3 kg (133 lb).     Sedation Ordered  MAC/deep sedation.   BMI<= 45 45 < BMI <= 48 48 < BMI < = 50  BMI > 50   No Restrictions No [facility]  No [facility]  [facility] with exceptions Hospital Only OR Only       Do you have a history of malignant hyperthermia or adverse reaction to anesthesia?  No    (Females) Are you currently pregnant?   No     Have you been diagnosed or told you have pulmonary hypertension?   No    Do you have an LVAD?  No    Have you been told you have moderate to severe sleep apnea?  No    Have you been told you have COPD, asthma, or any other lung disease?  No    Do you have any heart conditions?  No     Have you ever had or are you awaiting a heart or lung transplant?   No    Have you had a stroke or transient ischemic attack (TIA aka \"mini stroke\" in the last 6 months?   No    Have you been diagnosed with or been told you have cirrhosis of the liver?   No    Are you currently on dialysis?   No    Do you need assistance transferring?   No    BMI: Estimated body mass index is 22.13 kg/m  as calculated from the following:    Height as of 6/6/23: 1.651 m (5' 5\").    Weight as of 6/6/23: 60.3 kg (133 lb).     Is patients BMI > 40 and scheduling location [facility]?  No    Do you take the medication Phentermine, Ozempic or Wegovy?  No    Do you take the medication Naltrexone?  No    Do you take blood thinners?  No    Preferred Pharmacy:      [id number] - [place], [place] - [address]  [address] [place] [facility] [address]  Phone: [phone] Fax: [phone]      Prep   Are you currently on dialysis or do you have chronic kidney disease?  No (standard prep)    Do you have a diagnosis of diabetes?  Yes (Golytely Prep)    Do you have a diagnosis of cystic fibrosis (CF)?  No    On a regular basis do you go 3 -5 days between bowel movements?  No    BMI > 40?  No    Final Scheduling Details   Colonoscopy prep sent?  N/A    Procedure scheduled  Y    Surgeon:  STEPHANIE     Date of procedure:  8/25     Schedule PAC:   No    Location  [facility]    Sedation   MAC/Deep Sedation    Patient Reminders:    You will receive a call from a Nurse to review instructions and health history.  This assessment must be completed prior to your procedure.  Failure to complete the Nurse assessment may result in the procedure being cancelled.       On the day of your procedure, please designate an adult(s) who can drive you home stay with you for the next 24 hours. The medicines used in the exam will make you sleepy. You will not be able to drive.       You cannot take public transportation, ride share services, or non-medical taxi service without a responsible caregiver.  Medical transport services are allowed with the requirement that a responsible caregiver will receive you at your destination.  We require that drivers and caregivers are confirmed prior to your procedure.

## 2023-06-09 ENCOUNTER — TELEPHONE (OUTPATIENT)
Dept: GASTROENTEROLOGY | Facility: CLINIC | Age: 63
End: 2023-06-09
Payer: COMMERCIAL

## 2023-06-09 ENCOUNTER — PATIENT OUTREACH (OUTPATIENT)
Dept: CARE COORDINATION | Facility: CLINIC | Age: 63
End: 2023-06-09
Payer: COMMERCIAL

## 2023-06-09 NOTE — TELEPHONE ENCOUNTER
Spoke with patient and scheduled 4 mo follow-up appointment with Romero Winston for 10/9/23 per follow-up orders

## 2023-07-07 ENCOUNTER — PATIENT OUTREACH (OUTPATIENT)
Dept: CARE COORDINATION | Facility: CLINIC | Age: 63
End: 2023-07-07
Payer: COMMERCIAL

## 2023-07-11 ENCOUNTER — HOSPITAL ENCOUNTER (OUTPATIENT)
Dept: MAMMOGRAPHY | Facility: CLINIC | Age: 63
Discharge: HOME | End: 2023-07-11
Attending: NURSE PRACTITIONER | Admitting: NURSE PRACTITIONER
Payer: COMMERCIAL

## 2023-07-11 DIAGNOSIS — Z12.31 VISIT FOR SCREENING MAMMOGRAM: ICD-10-CM

## 2023-07-11 PROCEDURE — 77067 SCR MAMMO BI INCL CAD: CPT

## 2023-08-09 ENCOUNTER — TELEPHONE (OUTPATIENT)
Dept: GASTROENTEROLOGY | Facility: CLINIC | Age: 63
End: 2023-08-09
Payer: COMMERCIAL

## 2023-08-09 NOTE — TELEPHONE ENCOUNTER
Pre assessment completed for upcoming procedure.      Procedure details:    Patient scheduled for Upper endoscopy (EGD) on 8.25.23.     Arrival time: 1015. Procedure time 1115    Pre op exam needed? N/A    Facility location: [facility]; [address] [place] [address], [address] Floor, [place] [address]    Sedation type: MAC    Indication for procedure: dysphagia possible lichen planus esophagus     COVID policy reviewed.    Designated  policy reviewed. Instructed to have someone stay 24 hours post procedure.       Chart review:     Electronic implanted devices? No    Diabetic? Yes. See medication holding recommendations  Metformin - hold morning of procedure      Medication review:    Anticoagulants? No    NSAIDS? No    Other medication HOLDING recommendations:  N/A      Prep for procedure:     Prep instructions sent via [employer] Reviewed procedure prep instructions.     Patient verbalized understanding and had no questions or concerns at this time.        Jaymie Dodge RN  Endoscopy Procedure Pre Assessment RN  [phone] option 4

## 2023-08-25 ENCOUNTER — ANESTHESIA (OUTPATIENT)
Dept: SURGERY | Facility: AMBULATORY SURGERY CENTER | Age: 63
End: 2023-08-25
Payer: COMMERCIAL

## 2023-08-25 ENCOUNTER — HOSPITAL ENCOUNTER (OUTPATIENT)
Facility: AMBULATORY SURGERY CENTER | Age: 63
Discharge: HOME | End: 2023-08-25
Attending: INTERNAL MEDICINE
Payer: COMMERCIAL

## 2023-08-25 ENCOUNTER — ANESTHESIA EVENT (OUTPATIENT)
Dept: SURGERY | Facility: AMBULATORY SURGERY CENTER | Age: 63
End: 2023-08-25
Payer: COMMERCIAL

## 2023-08-25 VITALS — HEART RATE: 70 BPM

## 2023-08-25 VITALS
SYSTOLIC BLOOD PRESSURE: 89 MMHG | HEART RATE: 56 BPM | BODY MASS INDEX: 21.53 KG/M2 | HEIGHT: 66 IN | OXYGEN SATURATION: 97 % | RESPIRATION RATE: 16 BRPM | WEIGHT: 134 LBS | TEMPERATURE: 98.1 F | DIASTOLIC BLOOD PRESSURE: 66 MMHG

## 2023-08-25 LAB
GLUCOSE BLDC GLUCOMTR-MCNC: 106 MG/DL (ref 70–99)
UPPER GI ENDOSCOPY: NORMAL

## 2023-08-25 PROCEDURE — 43239 EGD BIOPSY SINGLE/MULTIPLE: CPT | Performed by: INTERNAL MEDICINE

## 2023-08-25 PROCEDURE — 82962 GLUCOSE BLOOD TEST: CPT | Performed by: PATHOLOGY

## 2023-08-25 PROCEDURE — 88305 TISSUE EXAM BY PATHOLOGIST: CPT | Mod: 26 | Performed by: PATHOLOGY

## 2023-08-25 PROCEDURE — 88305 TISSUE EXAM BY PATHOLOGIST: CPT | Mod: TC | Performed by: INTERNAL MEDICINE

## 2023-08-25 RX ORDER — NALOXONE HYDROCHLORIDE 0.4 MG/ML
0.4 INJECTION, SOLUTION INTRAMUSCULAR; INTRAVENOUS; SUBCUTANEOUS
Status: DISCONTINUED | OUTPATIENT
Start: 2023-08-25 | End: 2023-08-26 | Stop reason: HOSPADM

## 2023-08-25 RX ORDER — PROPOFOL 10 MG/ML
INJECTION, EMULSION INTRAVENOUS CONTINUOUS PRN
Status: DISCONTINUED | OUTPATIENT
Start: 2023-08-25 | End: 2023-08-25

## 2023-08-25 RX ORDER — GLYCOPYRROLATE 0.2 MG/ML
INJECTION, SOLUTION INTRAMUSCULAR; INTRAVENOUS PRN
Status: DISCONTINUED | OUTPATIENT
Start: 2023-08-25 | End: 2023-08-25

## 2023-08-25 RX ORDER — ONDANSETRON 4 MG/1
4 TABLET, ORALLY DISINTEGRATING ORAL EVERY 6 HOURS PRN
Status: DISCONTINUED | OUTPATIENT
Start: 2023-08-25 | End: 2023-08-26 | Stop reason: HOSPADM

## 2023-08-25 RX ORDER — PROCHLORPERAZINE MALEATE 10 MG
10 TABLET ORAL EVERY 6 HOURS PRN
Status: DISCONTINUED | OUTPATIENT
Start: 2023-08-25 | End: 2023-08-26 | Stop reason: HOSPADM

## 2023-08-25 RX ORDER — SODIUM CHLORIDE, SODIUM LACTATE, POTASSIUM CHLORIDE, CALCIUM CHLORIDE 600; 310; 30; 20 MG/100ML; MG/100ML; MG/100ML; MG/100ML
INJECTION, SOLUTION INTRAVENOUS CONTINUOUS PRN
Status: DISCONTINUED | OUTPATIENT
Start: 2023-08-25 | End: 2023-08-25

## 2023-08-25 RX ORDER — LIDOCAINE 40 MG/G
CREAM TOPICAL
Status: DISCONTINUED | OUTPATIENT
Start: 2023-08-25 | End: 2023-08-25 | Stop reason: HOSPADM

## 2023-08-25 RX ORDER — SODIUM CHLORIDE, SODIUM LACTATE, POTASSIUM CHLORIDE, CALCIUM CHLORIDE 600; 310; 30; 20 MG/100ML; MG/100ML; MG/100ML; MG/100ML
500 INJECTION, SOLUTION INTRAVENOUS CONTINUOUS
Status: DISCONTINUED | OUTPATIENT
Start: 2023-08-25 | End: 2023-08-25 | Stop reason: HOSPADM

## 2023-08-25 RX ORDER — NALOXONE HYDROCHLORIDE 0.4 MG/ML
0.2 INJECTION, SOLUTION INTRAMUSCULAR; INTRAVENOUS; SUBCUTANEOUS
Status: DISCONTINUED | OUTPATIENT
Start: 2023-08-25 | End: 2023-08-26 | Stop reason: HOSPADM

## 2023-08-25 RX ORDER — FLUMAZENIL 0.1 MG/ML
0.2 INJECTION, SOLUTION INTRAVENOUS
Status: ACTIVE | OUTPATIENT
Start: 2023-08-25 | End: 2023-08-25

## 2023-08-25 RX ORDER — ONDANSETRON 2 MG/ML
4 INJECTION INTRAMUSCULAR; INTRAVENOUS EVERY 6 HOURS PRN
Status: DISCONTINUED | OUTPATIENT
Start: 2023-08-25 | End: 2023-08-26 | Stop reason: HOSPADM

## 2023-08-25 RX ORDER — ONDANSETRON 2 MG/ML
4 INJECTION INTRAMUSCULAR; INTRAVENOUS
Status: COMPLETED | OUTPATIENT
Start: 2023-08-25 | End: 2023-08-25

## 2023-08-25 RX ORDER — PROPOFOL 10 MG/ML
INJECTION, EMULSION INTRAVENOUS PRN
Status: DISCONTINUED | OUTPATIENT
Start: 2023-08-25 | End: 2023-08-25

## 2023-08-25 RX ORDER — LIDOCAINE HYDROCHLORIDE 20 MG/ML
INJECTION, SOLUTION INFILTRATION; PERINEURAL PRN
Status: DISCONTINUED | OUTPATIENT
Start: 2023-08-25 | End: 2023-08-25

## 2023-08-25 RX ADMIN — LIDOCAINE HYDROCHLORIDE 80 MG: 20 INJECTION, SOLUTION INFILTRATION; PERINEURAL at 10:40

## 2023-08-25 RX ADMIN — ONDANSETRON 4 MG: 2 INJECTION INTRAMUSCULAR; INTRAVENOUS at 10:40

## 2023-08-25 RX ADMIN — GLYCOPYRROLATE 0.2 MG: 0.2 INJECTION, SOLUTION INTRAMUSCULAR; INTRAVENOUS at 10:42

## 2023-08-25 RX ADMIN — PROPOFOL 150 MCG/KG/MIN: 10 INJECTION, EMULSION INTRAVENOUS at 10:40

## 2023-08-25 RX ADMIN — SODIUM CHLORIDE, SODIUM LACTATE, POTASSIUM CHLORIDE, CALCIUM CHLORIDE 500 ML: 600; 310; 30; 20 INJECTION, SOLUTION INTRAVENOUS at 09:55

## 2023-08-25 RX ADMIN — PROPOFOL 30 MG: 10 INJECTION, EMULSION INTRAVENOUS at 10:47

## 2023-08-25 RX ADMIN — PROPOFOL 50 MG: 10 INJECTION, EMULSION INTRAVENOUS at 10:40

## 2023-08-25 RX ADMIN — SODIUM CHLORIDE, SODIUM LACTATE, POTASSIUM CHLORIDE, CALCIUM CHLORIDE: 600; 310; 30; 20 INJECTION, SOLUTION INTRAVENOUS at 10:40

## 2023-08-25 NOTE — ANESTHESIA CARE TRANSFER NOTE
Patient: Kitty Bangura    Procedure: Procedure(s):  ESOPHAGOGASTRODUODENOSCOPY, WITH BIOPSY       Diagnosis: Oral lichen planus [L43.8]  Esophageal dysphagia [R13.19]  Diagnosis Additional Information: No value filed.    Anesthesia Type:   No value filed.     Note:    Oropharynx: oropharynx clear of all foreign objects and spontaneously breathing  Level of Consciousness: awake  Oxygen Supplementation: room air    Independent Airway: airway patency satisfactory and stable  Dentition: dentition unchanged  Vital Signs Stable: post-procedure vital signs reviewed and stable  Report to RN Given: handoff report given  Patient transferred to: Phase II    Handoff Report: Identifed the Patient, Identified the Reponsible Provider, Reviewed the pertinent medical history, Discussed the surgical course, Reviewed Intra-OP anesthesia mangement and issues during anesthesia, Set expectations for post-procedure period and Allowed opportunity for questions and acknowledgement of understanding      Vitals:  Vitals Value Taken Time   BP 96/56    Temp 97    Pulse 63    Resp 16    SpO2 96        Electronically Signed By: TYLER Houston CRNA  August 25, 2023  11:02 AM

## 2023-08-25 NOTE — ANESTHESIA PREPROCEDURE EVALUATION
Anesthesia Pre-Procedure Evaluation    Patient: Kitty Bangura   MRN: [id number] : 1960        Procedure : Procedure(s):  ESOPHAGOGASTRODUODENOSCOPY, WITH BIOPSY          Past Medical History:   Diagnosis Date    Arthritis Post chemo    Suspected    Diabetes (H)     History of blood transfusion     History of total splenectomy 2018    Necrotizing pancreatitis     Necrotizing pancreatitis 2017    Pancreatic cancer (H)     Pancreatic mass 2017    On CT abd/pelvis: 3 cm heterogeneous mass within the tail of the pancreas. A few calcifications are noted along the lateral margin of the mass.    Pneumonia     2016    PONV (postoperative nausea and vomiting)       Past Surgical History:   Procedure Laterality Date    ABDOMEN SURGERY      Ruptured tubal pregnancies, additional surgeries followed    BACK SURGERY      L5, S1 discectomy    BREAST SURGERY      Breast reduction    COLONOSCOPY      COLONOSCOPY N/A 2018    Procedure: colonoscopy;  Surgeon: Lobito Marte MD;  Location: [facility]    DAVINCI LOBECTOMY LUNG Left 2021    Procedure: Robot-assisted thoracoscopic left lower lobe wedge resection;  Surgeon: René Phillips MD;  [facility]    ENDOSCOPIC RETROGRADE CHOLANGIOPANCREATOGRAM N/A 2018    Procedure: COMBINED ENDOSCOPIC RETROGRADE CHOLANGIOPANCREATOGRAPHY, PLACE TUBE/STENT;  Endoscopic retrograde cholangiopancreatogram with stent placement and cyst drainage bile ;  Surgeon: Vito Sanches MD;  [facility]    ENDOSCOPIC ULTRASOUND LOWER GASTROINTESTIONAL TRACT (GI) N/A 2018    Procedure: ENDOSCOPIC ULTRASOUND LOWER GASTROINTESTIONAL TRACT (GI);  Endoscopic Ultrasound with guided Cyst-Gastrostomy;  Surgeon: González Metz MD;  [facility]    ENDOSCOPIC ULTRASOUND, ESOPHAGOSCOPY, GASTROSCOPY, DUODENOSCOPY (EGD), NECROSECTOMY N/A 2017    Procedure: ENDOSCOPIC ULTRASOUND, ESOPHAGOSCOPY, GASTROSCOPY, DUODENOSCOPY (EGD),  NECROSECTOMY;  Esophagogastroduodenoscopy, with  Necrosectomy and stents replacement  ;  Surgeon: González Metz MD;  [facility]    ENDOSCOPIC ULTRASOUND, ESOPHAGOSCOPY, GASTROSCOPY, DUODENOSCOPY (EGD), NECROSECTOMY N/A 2017    Procedure: ENDOSCOPIC ULTRASOUND, ESOPHAGOSCOPY, GASTROSCOPY, DUODENOSCOPY (EGD), NECROSECTOMY;  Esophagogastroduodenoscopy with Necrosectomy, Balloon Dilation, stent removal X3 and Cystgastrostomy stent Placement X2;  Surgeon: Guru Cecilia Staples MD;  [facility]    ESOPHAGOSCOPY, GASTROSCOPY, DUODENOSCOPY (EGD), COMBINED N/A 2017    Procedure: COMBINED ENDOSCOPIC ULTRASOUND, ESOPHAGOSCOPY, GASTROSCOPY, DUODENOSCOPY (EGD), FINE NEEDLE ASPIRATE/BIOPSY;  Endoscopic Ultrasound with cystgastrostomy and fine needle aspiration ;  Surgeon: González Metz MD;  [facility]    ESOPHAGOSCOPY, GASTROSCOPY, DUODENOSCOPY (EGD), COMBINED N/A 2018    Procedure: COMBINED ESOPHAGOSCOPY, GASTROSCOPY, DUODENOSCOPY (EGD);  EGD;  Surgeon: González Metz MD;  Location:  GI    ESOPHAGOSCOPY, GASTROSCOPY, DUODENOSCOPY (EGD), COMBINED N/A 2018    Procedure: COMBINED ESOPHAGOSCOPY, GASTROSCOPY, DUODENOSCOPY (EGD), REMOVE FOREIGN BODY;;  Surgeon: González Metz MD;  Location:  GI    GYN SURGERY  1983    Multiple ectopic pregnancies, reconnect,  1988    INSERT PORT VASCULAR ACCESS Right 2018    Procedure: INSERT PORT VASCULAR ACCESS;  Chest Port Placement - right;  Surgeon: Chanda Lawson PA-C;  [facility]    IR PORT REMOVAL RIGHT  2019    LAPAROSCOPY DIAGNOSTIC (GENERAL) N/A 2018    Procedure: LAPAROSCOPY DIAGNOSTIC (GENERAL);  Diagnostic Laparoscopy; Open Distal Pancreatectomy And Splenectomy, splenic flexure mobilization, cholecystectomy, intraoperative ultrasound;  Surgeon: Ja Byrd MD;  Location:  OR    MAMMOPLASTY REDUCTION Bilateral 2006    PANCREATECTOMY, SPLENECTOMY N/A 2018    Procedure:  PANCREATECTOMY, SPLENECTOMY;;  Surgeon: Ja Byrd MD;  [facility]    MT LAMNOTMY INCL W/DCMPRSN NRV ROOT 1 INTRSPC LUMBR      Description: Hemilaminectomy With Disc Removal One Lumbar Interspace;  Recorded: 2008;  Comments: Right L5-S1 with resultant loss of right patellar and achilles reflex    REMOVE PORT VASCULAR ACCESS Right 2019    Procedure: Right Port Removal;  Surgeon: Juan J Donaldson PA-C;  [facility]    [facility]  DELIVERY ONLY      Description:  Section;  Recorded: 12/10/2009;      No Known Allergies   Social History     Tobacco Use    Smoking status: Former     Packs/day: 0.50     Years: 5.00     Pack years: 2.50     Types: Cigarettes     Start date: 1974     Quit date:      Years since quittin.6    Smokeless tobacco: Never   Substance Use Topics    Alcohol use: No     Alcohol/week: 21.0 standard drinks of alcohol     Comment: Now quit since Oct 31.  Moderate drinker in past      Wt Readings from Last 1 Encounters:   23 60.8 kg (134 lb)        Anesthesia Evaluation   Pt has had prior anesthetic.     History of anesthetic complications  - PONV.      ROS/MED HX  ENT/Pulmonary:       Neurologic:       Cardiovascular:       METS/Exercise Tolerance:     Hematologic:       Musculoskeletal:   (+)  arthritis,             GI/Hepatic:     (+)             liver disease,       Renal/Genitourinary:       Endo:     (+)  type II DM,                    Psychiatric/Substance Use:       Infectious Disease:       Malignancy:       Other:            Physical Exam    Airway  airway exam normal      Mallampati: II   TM distance: > 3 FB   Neck ROM: full   Mouth opening: > 3 cm    Respiratory Devices and Support         Dental       (+) Completely normal teeth      Cardiovascular          Rhythm and rate: regular and normal     Pulmonary   pulmonary exam normal        breath sounds clear to auscultation           OUTSIDE LABS:  CBC:   Lab Results   Component Value Date     WBC 7.6 11/11/2022    WBC 10.5 08/06/2021    HGB 13.1 11/11/2022    HGB 11.8 08/06/2021    HCT 39.7 11/11/2022    HCT 35.6 08/06/2021     11/11/2022     (H) 08/06/2021     BMP:   Lab Results   Component Value Date     11/11/2022     08/06/2021    POTASSIUM 4.7 11/11/2022    POTASSIUM 4.1 08/06/2021    CHLORIDE 102 11/11/2022    CHLORIDE 103 08/06/2021    CO2 27 11/11/2022    CO2 28 08/06/2021    BUN 17.7 11/11/2022    BUN 14 08/06/2021    CR 0.7 04/06/2023    CR 0.65 11/11/2022     (H) 08/25/2023     (H) 11/11/2022     COAGS:   Lab Results   Component Value Date    INR 1.01 07/08/2019     POC:   Lab Results   Component Value Date    BGM 91 07/08/2019     HEPATIC:   Lab Results   Component Value Date    ALBUMIN 4.3 11/11/2022    PROTTOTAL 7.2 11/11/2022    ALT 13 11/11/2022    AST 24 11/11/2022    ALKPHOS 76 11/11/2022    BILITOTAL 0.5 11/11/2022     OTHER:   Lab Results   Component Value Date    PH 7.32 (L) 04/17/2018    LACT 1.0 01/20/2018    A1C 6.3 (H) 11/11/2022    ILIANA 9.9 11/11/2022    PHOS 3.7 07/16/2018    MAG 2.0 07/28/2021    LIPASE 71 (L) 02/11/2018    AMYLASE 21 (L) 04/20/2018    TSH 2.05 11/11/2022       Anesthesia Plan    ASA Status:  3    NPO Status:  NPO Appropriate    Anesthesia Type: MAC.     - Reason for MAC: immobility needed, straight local not clinically adequate   Induction: Intravenous.   Maintenance: TIVA.        Consents    Anesthesia Plan(s) and associated risks, benefits, and realistic alternatives discussed. Questions answered and patient/representative(s) expressed understanding.     - Discussed:     - Discussed with:  Patient      - Extended Intubation/Ventilatory Support Discussed: No.      - Patient is DNR/DNI Status: No     Use of blood products discussed: No .     Postoperative Care    Pain management: IV analgesics, Oral pain medications, Multi-modal analgesia.   PONV prophylaxis: Ondansetron (or other 5HT-3), Background Propofol Infusion      Comments:                Ja Brar MD

## 2023-08-25 NOTE — H&P
Kitty Bangura  [id number]  female  62 year old      Reason for procedure/surgery: Dysphagia    Patient Active Problem List   Diagnosis    Leukocytosis    Fatty liver    Pancreatic adenocarcinoma (H)    Anemia    Thrombocytopenia (H)    Type 2 diabetes mellitus without complication, without long-term current use of insulin (H)    Drug-induced polyneuropathy (H)    s/p LLL wedge - 7/28    Asplenia after surgical procedure    Oral lichen planus    Leiomyoma of uterus, unspecified    Alcohol dependence (H)    Esophageal dysphagia       Past Surgical History:    Past Surgical History:   Procedure Laterality Date    ABDOMEN SURGERY  1983    Ruptured tubal pregnancies, additional surgeries followed    BACK SURGERY  2004    L5, S1 discectomy    BREAST SURGERY  2003    Breast reduction    COLONOSCOPY  2008    COLONOSCOPY N/A 12/11/2018    Procedure: colonoscopy;  Surgeon: Lobito Marte MD;  Location: [facility]    DAVINCI LOBECTOMY LUNG Left 7/28/2021    Procedure: Robot-assisted thoracoscopic left lower lobe wedge resection;  Surgeon: René Phillips MD;  [facility]    ENDOSCOPIC RETROGRADE CHOLANGIOPANCREATOGRAM N/A 1/25/2018    Procedure: COMBINED ENDOSCOPIC RETROGRADE CHOLANGIOPANCREATOGRAPHY, PLACE TUBE/STENT;  Endoscopic retrograde cholangiopancreatogram with stent placement and cyst drainage bile ;  Surgeon: Vito Sanches MD;  [facility]    ENDOSCOPIC ULTRASOUND LOWER GASTROINTESTIONAL TRACT (GI) N/A 1/25/2018    Procedure: ENDOSCOPIC ULTRASOUND LOWER GASTROINTESTIONAL TRACT (GI);  Endoscopic Ultrasound with guided Cyst-Gastrostomy;  Surgeon: González Metz MD;  [facility]    ENDOSCOPIC ULTRASOUND, ESOPHAGOSCOPY, GASTROSCOPY, DUODENOSCOPY (EGD), NECROSECTOMY N/A 12/4/2017    Procedure: ENDOSCOPIC ULTRASOUND, ESOPHAGOSCOPY, GASTROSCOPY, DUODENOSCOPY (EGD), NECROSECTOMY;  Esophagogastroduodenoscopy, with  Necrosectomy and stents replacement  ;  Surgeon: González Metz MD;  [facility]    ENDOSCOPIC ULTRASOUND, ESOPHAGOSCOPY, GASTROSCOPY, DUODENOSCOPY (EGD), NECROSECTOMY N/A 2017    Procedure: ENDOSCOPIC ULTRASOUND, ESOPHAGOSCOPY, GASTROSCOPY, DUODENOSCOPY (EGD), NECROSECTOMY;  Esophagogastroduodenoscopy with Necrosectomy, Balloon Dilation, stent removal X3 and Cystgastrostomy stent Placement X2;  Surgeon: Guru Cecilia Staples MD;  [facility]    ESOPHAGOSCOPY, GASTROSCOPY, DUODENOSCOPY (EGD), COMBINED N/A 2017    Procedure: COMBINED ENDOSCOPIC ULTRASOUND, ESOPHAGOSCOPY, GASTROSCOPY, DUODENOSCOPY (EGD), FINE NEEDLE ASPIRATE/BIOPSY;  Endoscopic Ultrasound with cystgastrostomy and fine needle aspiration ;  Surgeon: González Metz MD;  [facility]    ESOPHAGOSCOPY, GASTROSCOPY, DUODENOSCOPY (EGD), COMBINED N/A 2018    Procedure: COMBINED ESOPHAGOSCOPY, GASTROSCOPY, DUODENOSCOPY (EGD);  EGD;  Surgeon: González Metz MD;  [facility]    ESOPHAGOSCOPY, GASTROSCOPY, DUODENOSCOPY (EGD), COMBINED N/A 2018    Procedure: COMBINED ESOPHAGOSCOPY, GASTROSCOPY, DUODENOSCOPY (EGD), REMOVE FOREIGN BODY;;  Surgeon: González Metz MD;  Location:  GI    GYN SURGERY  1983    Multiple ectopic pregnancies, reconnect,  1988    INSERT PORT VASCULAR ACCESS Right 2018    Procedure: INSERT PORT VASCULAR ACCESS;  Chest Port Placement - right;  Surgeon: Chanda Lawson PA-C;  [facility]    IR PORT REMOVAL RIGHT  2019    LAPAROSCOPY DIAGNOSTIC (GENERAL) N/A 2018    Procedure: LAPAROSCOPY DIAGNOSTIC (GENERAL);  Diagnostic Laparoscopy; Open Distal Pancreatectomy And Splenectomy, splenic flexure mobilization, cholecystectomy, intraoperative ultrasound;  Surgeon: Ja Byrd MD;  [facility]    MAMMOPLASTY REDUCTION Bilateral 2006    PANCREATECTOMY, SPLENECTOMY N/A 2018    Procedure: PANCREATECTOMY, SPLENECTOMY;;  Surgeon: Ja Byrd MD;  [facility]    IA LAMNOTMY INCL W/DCMPRSN NRV ROOT 1 INTRSPC LUMBR       Description: Hemilaminectomy With Disc Removal One Lumbar Interspace;  Recorded: 2008;  Comments: Right L5-S1 with resultant loss of right patellar and achilles reflex    REMOVE PORT VASCULAR ACCESS Right 2019    Procedure: Right Port Removal;  Surgeon: Juan J Donaldson PA-C;  Location:  OR    [facility]  DELIVERY ONLY      Description:  Section;  Recorded: 12/10/2009;       Past Medical History:   Past Medical History:   Diagnosis Date    Arthritis Post chemo    Suspected    Diabetes (H)     History of blood transfusion     History of total splenectomy 2018    Necrotizing pancreatitis     Necrotizing pancreatitis 2017    Pancreatic cancer (H)     Pancreatic mass 2017    On CT abd/pelvis: 3 cm heterogeneous mass within the tail of the pancreas. A few calcifications are noted along the lateral margin of the mass.    Pneumonia     2016    PONV (postoperative nausea and vomiting)        Social History:   Social History     Tobacco Use    Smoking status: Former     Packs/day: 0.50     Years: 5.00     Pack years: 2.50     Types: Cigarettes     Start date: 1974     Quit date:      Years since quittin.6    Smokeless tobacco: Never   Substance Use Topics    Alcohol use: No     Alcohol/week: 21.0 standard drinks of alcohol     Comment: Now quit since Oct 31.  Moderate drinker in past       Family History:   Family History   Problem Relation Age of Onset    Heart Disease Father     Hyperlipidemia Father     Heart Disease Brother     Diabetes Mother     Hypertension Mother     Obesity Mother     Depression Mother     Diabetes Maternal Grandfather     Hypertension Maternal Grandfather     Obesity Maternal Grandfather     Diabetes Sister     Hypertension Sister     Depression Sister     Anxiety Disorder Sister     Obesity Sister     Hypertension Brother     Hyperlipidemia Brother     Heart Disease Brother     Breast Cancer Cousin     Breast Cancer Cousin     Breast Cancer  Cousin     Colon Cancer Other     Other Cancer Other         Mesothelioma    Depression Sister     Anxiety Disorder Brother     Cancer Brother 64        colon cancer    Anxiety Disorder Son     Depression Son     Mental Illness Sister         Schizophrenia    Hypertension Sister     Obesity Maternal Grandmother     Obesity Sister     Diabetes Nephew     Hypertension Brother        Allergies: No Known Allergies    Active Medications:   Current Outpatient Medications   Medication Sig Dispense Refill    acetaminophen (TYLENOL) 500 MG tablet Take 2 tablets (1,000 mg) by mouth every 8 hours 100 tablet 1    amylase-lipase-protease (CREON) 85951-97733 units CPEP per EC capsule TAKE 4 CAPSULES WITH MEALS AND 2 CAPSULES WITH SNACKS, UP TO 16 CAPSULES PER DAY 1440 capsule 3    gabapentin (NEURONTIN) 100 MG capsule Take 3 capsules (300 mg) by mouth 3 times daily 270 capsule 3    metFORMIN (GLUCOPHAGE XR) 500 MG 24 hr tablet Take 1 tablet (500 mg) by mouth 2 times daily (with meals) 180 tablet 3    alcohol swab prep pads Use to swab area of injection/avel as directed. 100 each 3    blood glucose (NO BRAND SPECIFIED) test strip Use to test blood sugar 2 times daily or as directed. To accompany: Blood Glucose Monitor Brands: per insurance. 100 strip 6    blood glucose (ONETOUCH VERIO IQ) test strip Use to test blood sugar 4 times daily or as directed. 400 each 3    blood glucose calibration (NO BRAND SPECIFIED) solution To accompany: Blood Glucose Monitor Brands: per insurance. 1 each 3    blood glucose monitoring (NO BRAND SPECIFIED) meter device kit Use to test blood sugar 2 times daily or as directed. Preferred blood glucose meter OR supplies to accompany: Blood Glucose Monitor Brands: per insurance. 1 kit 0    clobetasol (TEMOVATE) 0.05 % GEL topical gel Apply topically 2 times daily 30 g 1    clobetasol (TEMOVATE) 0.05 % GEL topical gel Apply topically 2 times daily 15 g 1    OneTouch Delica Lancets 33G MISC 4 lancets daily  "150 each 3    thin (NO BRAND SPECIFIED) lancets Use with lanceting device. To accompany: Blood Glucose Monitor Brands: per insurance. 100 each 6       Systemic Review:   CONSTITUTIONAL: NEGATIVE for fever, chills, change in weight  ENT/MOUTH: NEGATIVE for ear, mouth and throat problems  RESP: NEGATIVE for significant cough or SOB  CV: NEGATIVE for chest pain, palpitations or peripheral edema    Physical Examination:   Vital Signs: BP 91/67 (BP Location: Right arm)   Pulse 58   Temp 97.1  F (36.2  C) (Temporal)   Resp 18   Ht 1.664 m (5' 5.5\")   Wt 60.8 kg (134 lb)   SpO2 95%   BMI 21.96 kg/m    GENERAL: healthy, alert and no distress  NECK: no adenopathy, no asymmetry, masses, or scars  RESP: lungs clear to auscultation - no rales, rhonchi or wheezes  CV: regular rate and rhythm, normal S1 S2, no S3 or S4, no murmur, click or rub, no peripheral edema and peripheral pulses strong  ABDOMEN: soft, nontender, no hepatosplenomegaly, no masses and bowel sounds normal  MS: no gross musculoskeletal defects noted, no edema    Plan: Appropriate to proceed as scheduled.      Diaz Romeo MD  8/25/2023    PCP:  Kathy Jurado    "

## 2023-08-25 NOTE — ANESTHESIA POSTPROCEDURE EVALUATION
Patient: Kitty Bangura    Procedure: Procedure(s):  ESOPHAGOGASTRODUODENOSCOPY, WITH BIOPSY       Anesthesia Type:  No value filed.    Note:  Disposition: Outpatient   Postop Pain Control: Uneventful            Sign Out: Well controlled pain   PONV: No   Neuro/Psych: Uneventful            Sign Out: Acceptable/Baseline neuro status   Airway/Respiratory: Uneventful            Sign Out: Acceptable/Baseline resp. status   CV/Hemodynamics: Uneventful            Sign Out: Acceptable CV status   Other NRE: NONE   DID A NON-ROUTINE EVENT OCCUR? No           Last vitals:  Vitals Value Taken Time   BP 89/66 08/25/23 1118   Temp 36.7  C (98.1  F) 08/25/23 1118   Pulse 56 08/25/23 1118   Resp 16 08/25/23 1118   SpO2 97 % 08/25/23 1118       Electronically Signed By: Ja Brar MD  August 25, 2023  11:32 AM

## 2023-08-28 LAB
PATH REPORT.COMMENTS IMP SPEC: NORMAL
PATH REPORT.COMMENTS IMP SPEC: NORMAL
PATH REPORT.FINAL DX SPEC: NORMAL
PATH REPORT.GROSS SPEC: NORMAL
PATH REPORT.MICROSCOPIC SPEC OTHER STN: NORMAL
PATH REPORT.RELEVANT HX SPEC: NORMAL
PHOTO IMAGE: NORMAL

## 2023-09-06 ENCOUNTER — HOSPITAL ENCOUNTER (OUTPATIENT)
Dept: CT IMAGING | Facility: CLINIC | Age: 63
Discharge: HOME | End: 2023-09-06
Attending: INTERNAL MEDICINE | Admitting: INTERNAL MEDICINE
Payer: COMMERCIAL

## 2023-09-06 DIAGNOSIS — C25.9 MALIGNANT NEOPLASM OF PANCREAS, UNSPECIFIED LOCATION OF MALIGNANCY (H): ICD-10-CM

## 2023-09-06 PROCEDURE — 74177 CT ABD & PELVIS W/CONTRAST: CPT

## 2023-09-06 PROCEDURE — 250N000011 HC RX IP 250 OP 636: Mod: JZ | Performed by: INTERNAL MEDICINE

## 2023-09-06 RX ORDER — IOPAMIDOL 755 MG/ML
100 INJECTION, SOLUTION INTRAVASCULAR ONCE
Status: COMPLETED | OUTPATIENT
Start: 2023-09-06 | End: 2023-09-06

## 2023-09-06 RX ADMIN — IOPAMIDOL 100 ML: 755 INJECTION, SOLUTION INTRAVENOUS at 11:33

## 2023-09-07 NOTE — PROGRESS NOTES
Virtual Visit Details    Type of service:  Video Visit       Originating Location (pt. Location): Home    Distant Location (provider location):  On-site  Platform used for Video Visit: [facility]      Oncology Follow-up Visit:  Sep 11, 2023    Cancer diagnosis: cystic pancreatic tail adenocarcinoma  small subcentimeter pulmonary nodules seen on staging scans of unclear etiology.     Treatment to date: neoadjuvant FOLFIRINOX x4 cycles, 1/15/18-3/6/18  Difficulty tolerating neoadjuvant intent chemotherapy. Distal pancreatectomy performed April 17, 2018, no residual carcinoma seen on operative pathology.  Treated with four cycles of adjuvant chemotherapy with gemcitabine and Xeloda, completed September 2018.     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    COVID VACCINATION: April 2021 Moderna; booster Nov 2021, July 2022.      Referring physician: Dr. González Metz MD    Oncology History: She was previously healthy until she presented in early November 2017 with abdominal pain. CT abdomen on 11/1/17 showed acute pancreatitis (lipase 41,960) and a 3cm heterogenous pancreatic tail mass. The etiology of pancreatitis is unclear - no obstructing gallstone and not a heavy drinker. She was hospitalized for one week and followed up with Dr. González Metz in GI clinic.     11/29/17 -- endoscopic drainage of walled-off area of pancreatic necrosis by Dr. Metz. During the same procedure she had an EUS FNA of the pancreatic tail mass and pathology showed adenocarcinoma. The mass measured 33 x 27 mm, encapsulated with solid and cystic components, rim calcification, and no evidence of invasion.     12/9/17 -- Staging CT chest/abd/pelvis showed several small pulmonary nodules (largest 3mm), unchanged mass in the pancreatic tail, mildly prominent mesenteric  nodes thought likely reactive, and small right hepatic lobe hypodensities. Abdominal MRI on 12/10/17 showed hepatic hemangiomas, no evidence of metastatic disease to the liver.     1/8/18 - - oncology consultation, Dr. Monk. Recommendation: neoadjuvant intent chemotherapy with FOLFIRINOX.    1/15/18 - - cycle 1/day one FOLFIRINOX chemotherapy.    1/20 through 1/27/18 - - hospitalization at the [facility], for acute pancreatitis. Following three days of nausea, vomiting, pain. During this hospitalization: ERCP was attempted by Dr. Sanches with pancreatic stent placement, but pancreatic duct was completely obstructed and wire was unable to pass beyond the duct. Dr. Metz performed successful US guided cyst gastrostomy January 25, 2018.    1/31/18 - - oncology follow-up, DANTE Talavera. Neutropenic with ANC 0.4, thus defer cycle two of chemotherapy.    2/5/18 - - oncology follow-up, DANTE Junior.  Cycle 2/day one FOLFIRINOX chemotherapy. Patient endorses cold sensitivity secondary to oxaliplatin. Neulasta given for growth factor support. Due to significant neutropenia with cycle one.    2/11/18 - - ER evaluation for epigastric pain. Discharged to home from ER. Leukocytosis secondary to Neulasta given on February 8.    2/20/18 - - oncology follow-up, DANTE Junior. Cycle 3/day one FOLFIRINOX given without Neulasta. At patient request, oxaliplatin dose reduced by 10% due to neuropathy/cold sensitivity.    3/6/18 - - oncology follow-up, DANTE Talavera. Cycle 4/day one FOLFIRINOX chemotherapy.    3/13/18 - - CT chest/abdomen/pelvis - - IMPRESSION: 1. Minimal decrease in size and marked decrease in enhancement and pancreatic mass since the comparison study. 2. No significant change in low dense lesions in the liver since comparison.    3/16/18 - - oncology follow-up Dr. Monk. Plan is to hold on further chemotherapy as surgery is scheduled for 4/17/18 4/17/18 - - surgery:  PROCEDURE: Diagnostic laparoscopy, splenic flexure mobilization, intraoperative ultrasound, distal pancreatectomy, splenectomy, and cholecystectomy. SURGEON: Ja Byrd MD  Final surgical pathology showed necrotizing pancreatitis, no evidence of residual cancer, complete pathologic response, 26 benign lymph nodes.  FINAL DIAGNOSIS: A. DISTAL PANCREAS, SPLEEN AND OMENTUM, RESECTION: - Necrotizing pancreatitis with residual acellular mucin and foci of high grade dysplasia, status post neoadjuvant therapy   - Treatment response: complete (score 0)   - Twenty-six benign lymph nodes (0/26)   - Negative for residual carcinoma   - Pathologic staging: [id number]   - Focal infarction, metaplastic bone formation - Surgical margins are free of neoplasia - Unremarkable spleen and omentum   B. GALLBLADDER, CHOLECYSTECTOMY: - Benign gallbladder, biliary mucosa with no significant histologic abnormality - Negative for dysplasia COMMENT: Histologic sections demonstrate pools of acellular mucin, necrosis and scattered high grade dysplasia (PanIN-3) with no evidence of residual invasive carcinoma    4/30/18 - - surgical oncology follow-up, Dr. Byrd.  5/4/18 - - palliative care follow-up Dr. Snow. No specific recommendations required at this time.  5/14/18 - - oncology follow-up, Dr. Monk. Plan: adjuvant chemotherapy with gemcitabine and Xeloda.  5/30/18 - - cycle 1/day one adjuvant chemotherapy with gemcitabine and Xeloda.  Cycle three was dose reduced due to mucositis secondary to Xeloda.  8/28/18 - - oncology follow-up, DANTE Talavera. Cycle 4/day one gemcitabine and Xeloda.  9/17/18 - - CT chest/abdomen/pelvis - -IMPRESSION:  Resolving postoperative changes. No new findings or evidence of metastatic disease.  9/24/18 - - oncology follow-up, Dr. Monk. PLAN: initiate active surveillance.  12/21/18--CT c/a/p--IMPRESSION:  1. Stable tiny 2 to 3 mm lung nodules as described above. This  suggests a benign etiology.  2. Two low  density liver lesions. These appear to vary in appearance  depending on slight variations in timing of the bolus of contrast.  Likely no significant change. Recommend continued close surveillance.  3. No evidence of recurrent mass in the abdomen or pelvis. No  adenopathy.  12/28/18--scheduled oncology follow-up, Dr Monk. PATIENT NO-SHOW.  12/31/18--oncology follow-up, Dr oMnk. Plan: Continue surveillance.  Increase Creon dose due to fatty food intolerance/weight loss.    3/29/19--CT c/a/p-- IMPRESSION: Stable scan compared to 12/21/2018 with the following  findings:  1. Several bilateral small pulmonary nodules, unchanged in appearance.  2. Two right hepatic small hypodense lesions, possibly hemangiomas.  These are also stable in appearance.  3. No apparent recurrent mass.  4/5/19 - - oncology follow-up, Dr. Monk.     7/1/19--CT c/a/p--IMPRESSION:  Stable examination with no convincing metastatic or  recurrent neoplasm.  7/8/19--oncology follow-up, Dr. Monk.    9/30/19 - - CT chest/abdomen/pelvis - - IMPRESSION:  1.  There is a small grouped area of nodularity in the right middle  lobe. Grouped nature favors an inflammatory etiology.  2.  The abdomen CT is stable with no convincing metastatic or  recurrent neoplasm.  10/7/19 - - oncology follow-up, Dr. Monk.    12/13/19 - - CT chest/abdomen/pelvis - - IMPRESSION:  1.  Stable abdomen/pelvis CT with no convincing metastatic or  recurrent neoplasm.  2. New/additional small focus of grouped nodularity in the right  middle lobe. Inflammatory etiology is still favored.  12/20/19 - - oncology follow-up, Dr. Monk.    4/13/20--CT c/a/p--IMPRESSION:  Interval resolution of suspected inflammatory change in right middle lobe. Otherwise, stable examination.       4/20/20--oncology follow-up, Dr. Monk.      8/3/20--CT c/a/p--IMPRESSION:  Stable examination with no convincing metastatic or  recurrent neoplasm.    August 10, 2020 -- oncology follow-up/virtual visit, Dr. Monk.  12/8/20--CT  c/a/p--IMPRESSION:  1.  Stable appearance of distal pancreatectomy and splenectomy.  2.  Stable tiny nodules through both lungs.  3.  New area of likely atelectasis in the posterior left apex  measuring 6 mm. Follow-up would be recommended in six months or to  correspond with the next routine oncologic follow-up.  4.  Two stable small ill-defined lesions in the right hepatic lobe.    December 11, 2020 -- oncology follow-up/virtual visit, Dr. Monk.    3/29/21--CT c/a/p--                                                               IMPRESSION:  1.  Stable appearance of distal pancreatectomy and splenectomy.  2.  The questioned nodular area in the posterior left apex on the  previous exam has nearly completely resolved.  3.  There is a new 8 mm slightly lobulated nodular opacity in the  anterior left lower lobe. Given the appearance over the course of  three months this is likely benign, but follow-up is recommended.  4.  Interval slight increase in size of level 1 and level 2 left  axillary lymph nodes that are now prominent to borderline enlarged.  These could be due to reactive process, but attention is recommended  in future exams.  5. Two low-attenuation areas are again identified in the liver. One of  these appears minimally increased in size, but these are both still  below 1 cm in size. Continued attention to this area would be  recommended, but these are likely benign.    April 5, 2021 -- oncology follow-up/virtual visit, Dr. Monk.    July 28, 2021 --wedge resection surgery for new lung nodule seen on scan:  Final Diagnosis   Lung, left lower lobe, wedge resection:  -Carcinoid tumorlet, 0.3 cm  -Focal neuroendocrine cell hyperplasia  -Granulomatous inflammation with central necrosis          11/22/21--CT chest: IMPRESSION:  Resolving postop change in the left lung. No new finding.  11/22/21--MRI abdomen --IMPRESSION: In this patient with history of pancreatic neuroendocrine  tumor status post distal  pancreatectomy and splenectomy, again noted a  few small hepatic lesions which demonstrate no significant change in  size as compared to prior studies dating back to 12/13/2019 exam.  Although these are too small to characterize, they are likely benign  given their interval stability. No new suspicious focal hepatic  lesion. Recommend continued attention on follow-up.       November 29, 2021 -- oncology follow-up/virtual visit, Dr. Monk.    4/12/22--CT chest - IMPRESSION:   1.  No interval change to most recent comparison dated 11/22/2021.  2.  Status post left lower lobe wedge resection.  3.  A few unchanged punctate pulmonary nodules, likely benign.  4.  No new or enlarging pulmonary nodules.    4/12/22-MRI abdomen--IMPRESSION: In this patient with history of pancreatic neuroendocrine [sic]  tumor status post distal pancreatectomy and splenectomy, again noted a  few small hepatic lesions which demonstrate no significant change in  size as compared to prior studies dating back to 12/13/2019 exam.  Although these are too small to characterize, they are likely benign  given their interval stability. No new suspicious focal hepatic  Lesion.    April 22, 2022 -- oncology follow-up/virtual visit, Dr. Monk.    10/11/22--CT c/a/p--IMPRESSION:  1.  No evidence of recurrent or metastatic disease.  2.  Similar 2 mm pulmonary micronodules.  3.  Stable pancreatectomy and splenectomy.  4.  Left lower lobe wedge resection.    October 17, 2022 -- oncology follow-up/virtual visit, Dr. Monk.      April 6, 2023--CT c/a/p--IMPRESSION:  1.  No evidence of recurrent or metastatic disease.  2.  A new small cluster of small pulmonary nodules in the right middle lobe is most likely infectious or inflammatory. Recommend attention on follow-up imaging.    April 10, 2023 -- oncology follow-up/virtual visit, Dr. Monk.  9/6/23--CT c/a/p--IMPRESSION:  1.  No evidence for recurrent or metastatic disease in the chest, abdomen, or pelvis.    September  2023 -- oncology follow-up/virtual visit, Dr. Monk.        Interim History:  Ms. Bangura joins me from her home for a virtual video visit.      This month marks five years since completion of postoperative chemotherapy, and 5 1/2 years since surgical resection of her pancreas adenocarcinoma.  She has abdominal pain that wakes her up 2 to 3 times per month, with cramping sensations accompanied by diarrhea.  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  She meets with Dr. Romeo from our gastroenterology team, and he recently performed upper endoscopy  for evaluation of her symptoms.  Biopsies only showed mild glycogenic acanthosis, with no evidence of esophagitis.  At this point, the working diagnosis is possible dysmotility syndrome, which be not likely related to her pancreas cancer and treatment.  Her last colonoscopy was performed 2018.    Her recent CT scan, completed on  shows no evidence of cancer recurrence.       Latest Reference Range & Units 21 09:44 22 11:57 10/11/22 13:08 23 11:06   Cancer Antigen 19-9 <=35 U/mL 3 <2 <2 <2      Latest Reference Range & Units 21 09:10 21 09:44 22 11:57   Cancer Antigen 19-9 <=35 U/mL 2 [1] 3 [2] <2 [3]     Review Of Systems: Comprehensive 14-point ROS reviewed. Pertinent symptoms reviewed above per HPI.    PAST MEDICAL HISTORY:   Pre-diabetes  Pancreatitis and pancreas adenoca as above  Lung carcinoid tumorlet--resected 21     PAST SURGICAL HISTORY:  Laparotomy x2 for ruptured tubal pregnancies    Discectomy for herniated lumbar disk  Breast reduction surgery     FAMILY HISTORY:  Colon cancer in maternal aunt  Paternal aunt and cousins with breast cancer  CAD in father and brother  A brother  of colon cancer in      SH: , from [place], moved to [place] in . She has a 30+ yr-old son. Works as . former  smoker, quit 30 years ago. two glasses of wine per night, none since pancreatitis diagnosis     Allergies: none      Current Outpatient Medications:      acetaminophen (TYLENOL) 500 MG tablet, Take 2 tablets (1,000 mg) by mouth every 8 hours, Disp: 100 tablet, Rfl: 1     alcohol swab prep pads, Use to swab area of injection/avel as directed., Disp: 100 each, Rfl: 3     amylase-lipase-protease (CREON) 42954-79997 units CPEP per EC capsule, TAKE 4 CAPSULES WITH MEALS AND 2 CAPSULES WITH SNACKS, UP TO 16 CAPSULES PER DAY, Disp: 1440 capsule, Rfl: 3     blood glucose (NO BRAND SPECIFIED) test strip, Use to test blood sugar 2 times daily or as directed. To accompany: Blood Glucose Monitor Brands: per insurance., Disp: 100 strip, Rfl: 6     blood glucose (ONETOUCH VERIO IQ) test strip, Use to test blood sugar 4 times daily or as directed., Disp: 400 each, Rfl: 3     blood glucose calibration (NO BRAND SPECIFIED) solution, To accompany: Blood Glucose Monitor Brands: per insurance., Disp: 1 each, Rfl: 3     blood glucose monitoring (NO BRAND SPECIFIED) meter device kit, Use to test blood sugar 2 times daily or as directed. Preferred blood glucose meter OR supplies to accompany: Blood Glucose Monitor Brands: per insurance., Disp: 1 kit, Rfl: 0     clobetasol (TEMOVATE) 0.05 % GEL topical gel, Apply topically 2 times daily, Disp: 30 g, Rfl: 1     clobetasol (TEMOVATE) 0.05 % GEL topical gel, Apply topically 2 times daily, Disp: 15 g, Rfl: 1     gabapentin (NEURONTIN) 100 MG capsule, Take 3 capsules (300 mg) by mouth 3 times daily, Disp: 270 capsule, Rfl: 3     metFORMIN (GLUCOPHAGE XR) 500 MG 24 hr tablet, Take 1 tablet (500 mg) by mouth 2 times daily (with meals), Disp: 180 tablet, Rfl: 3     OneTouch Delica Lancets 33G MISC, 4 lancets daily, Disp: 150 each, Rfl: 3     thin (NO BRAND SPECIFIED) lancets, Use with lanceting device. To accompany: Blood Glucose Monitor Brands: per insurance., Disp: 100 each, Rfl: 6  No current  facility-administered medications for this visit.    Facility-Administered Medications Ordered in Other Visits:      heparin 100 UNIT/ML injection 5 mL, 5 mL, Intracatheter, Once, Art Guevara MD      Physical Exam:    In-person physical exam could not be performed today in context of a Virtual Visit. Observed physical assessments made today by visualizing the patient by video link:  Vitals - Patient Reported  Pain Score: No Pain (0)             General/Constitutional: Generally appears well, not acutely ill.  HEENT: no scleral icterus, not jaundiced.  Respiratory: no labored breathing.  Musculoskeletal: appears to have full range of motion and adequate physical strength.  Skin: no jaundice, discoloration or other notable lesions.  Neurological: no evidence of tremors.  Psychiatric: no evident anxiety; fully alert and oriented with fluent speech.      The rest of a comprehensive physical examination is deferred due to nature of video visits.    Laboratory/Imaging Studies  Prior to and including the day of this visit, I personally reviewed the recent imaging scans. I released the pertinent recent imaging results to [employer] in advance of this visit, and reviewed the summary verbatim and in lay language during today's call.      ASSESSMENT/PLAN:  Mrs. Bangura is a 62 year old woman with resected pancreatic tail adenocarcinoma.  She had initial extensive and severe necrotizing pancreatitis upon initial diagnosis and hospitalization that delayed treatment.  We ultimately were able to begin neoadjuvant FOLFIRINOX for 4 cycles beginning in January.  By the time she had pancreatectomy by Dr. Byrd in mid April there was no evidence of residual carcinoma seen on operative pathology.  She completed subsequently 4 months of gemcitabine and Xeloda in the adjuvant setting.        She had a wedge resection of her lung-based carcinoid tumor that is 3 mm in diameter.  This finding was completely unrelated to her  pancreatic adenocarcinoma, which was resected April 2018.    Once again, I congratulated her on her long-term survivorship, and this month marks five years since completion of adjuvant chemotherapy.  At this time we will transition her, with her permission, to our long-term cancer survivorship clinic, with appointment to be scheduled in late spring of 2024.  I advised her to let us know if she develops any symptoms not explained by other medical issues that may warrant further evaluation within oncology clinic. She will follow up with her primary care physician for noncancer related issues, and we briefly discussed advice regarding seasonal influenza vaccination, and she asked me at hepatitis B boosters that she will discuss with her primary care physician as well.      VIRTUAL VISIT - DETAILS:    I have reviewed the note as documented above. This accurately captures the substance of my conversation with the patient.    Date of call: September 11, 2023   Start of call: 8:45 am  End of call: 8:57 am    Provider location: [facility] (academic office)  Patient location: Home      Mode of Video Visit: Amwell           I spent 12 minutes in consultation, including history and discussion with the patient including review of recent lab values and radiologic imaging results.  An additional 17 minutes was spent on the day of the visit, including reviewing pertinent medical notes and documentation from other physicians and APPs who have evaluated and treated this patient, pertinent lab values, pathology and imaging results, personal review of radiologic images, discussing the case with referring providers and/or nurse coordinator team, post-visit orders, and all post-visit documentation.    Jovany Monk MD PhD

## 2023-09-11 ENCOUNTER — VIRTUAL VISIT (OUTPATIENT)
Dept: ONCOLOGY | Facility: CLINIC | Age: 63
End: 2023-09-11
Attending: INTERNAL MEDICINE
Payer: COMMERCIAL

## 2023-09-11 VITALS — BODY MASS INDEX: 22.33 KG/M2 | HEIGHT: 65 IN | WEIGHT: 134 LBS

## 2023-09-11 DIAGNOSIS — C25.7 MALIGNANT NEOPLASM OF OTHER PARTS OF PANCREAS (H): Primary | ICD-10-CM

## 2023-09-11 PROCEDURE — 99214 OFFICE O/P EST MOD 30 MIN: CPT | Mod: VID | Performed by: INTERNAL MEDICINE

## 2023-09-11 ASSESSMENT — PAIN SCALES - GENERAL: PAINLEVEL: NO PAIN (0)

## 2023-09-11 NOTE — LETTER
9/11/2023         RE: Kitty Bangura  [address] [place] [id number]        Dear Colleague,    Thank you for referring your patient, Kitty Bangura, to the [facility]. Please see a copy of my visit note below.    Virtual Visit Details    Type of service:  Video Visit       Originating Location (pt. Location): Home    Distant Location (provider location):  On-site  Platform used for Video Visit: [facility]      Oncology Follow-up Visit:  Sep 11, 2023    Cancer diagnosis: cystic pancreatic tail adenocarcinoma  small subcentimeter pulmonary nodules seen on staging scans of unclear etiology.     Treatment to date: neoadjuvant FOLFIRINOX x4 cycles, 1/15/18-3/6/18  Difficulty tolerating neoadjuvant intent chemotherapy. Distal pancreatectomy performed April 17, 2018, no residual carcinoma seen on operative pathology.  Treated with four cycles of adjuvant chemotherapy with gemcitabine and Xeloda, completed September 2018.     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    COVID VACCINATION: April 2021 Moderna; booster Nov 2021, July 2022.      Referring physician: Dr. González Metz MD    Oncology History: She was previously healthy until she presented in early November 2017 with abdominal pain. CT abdomen on 11/1/17 showed acute pancreatitis (lipase 41,960) and a 3cm heterogenous pancreatic tail mass. The etiology of pancreatitis is unclear - no obstructing gallstone and not a heavy drinker. She was hospitalized for one week and followed up with Dr. González Metz in GI clinic.     11/29/17 -- endoscopic drainage of walled-off area of pancreatic necrosis by Dr. Metz. During the same procedure she had an EUS FNA of the pancreatic tail mass and pathology showed adenocarcinoma. The mass measured 33 x 27 mm,  encapsulated with solid and cystic components, rim calcification, and no evidence of invasion.     12/9/17 -- Staging CT chest/abd/pelvis showed several small pulmonary nodules (largest 3mm), unchanged mass in the pancreatic tail, mildly prominent mesenteric nodes thought likely reactive, and small right hepatic lobe hypodensities. Abdominal MRI on 12/10/17 showed hepatic hemangiomas, no evidence of metastatic disease to the liver.     1/8/18 - - oncology consultation, Dr. Monk. Recommendation: neoadjuvant intent chemotherapy with FOLFIRINOX.    1/15/18 - - cycle 1/day one FOLFIRINOX chemotherapy.    1/20 through 1/27/18 - - hospitalization at the [facility], for acute pancreatitis. Following three days of nausea, vomiting, pain. During this hospitalization: ERCP was attempted by Dr. Sanches with pancreatic stent placement, but pancreatic duct was completely obstructed and wire was unable to pass beyond the duct. Dr. Metz performed successful US guided cyst gastrostomy January 25, 2018.    1/31/18 - - oncology follow-up, DANTE Talavera. Neutropenic with ANC 0.4, thus defer cycle two of chemotherapy.    2/5/18 - - oncology follow-up, DANTE Junior.  Cycle 2/day one FOLFIRINOX chemotherapy. Patient endorses cold sensitivity secondary to oxaliplatin. Neulasta given for growth factor support. Due to significant neutropenia with cycle one.    2/11/18 - - ER evaluation for epigastric pain. Discharged to home from ER. Leukocytosis secondary to Neulasta given on February 8.    2/20/18 - - oncology follow-up, DANTE Junior. Cycle 3/day one FOLFIRINOX given without Neulasta. At patient request, oxaliplatin dose reduced by 10% due to neuropathy/cold sensitivity.    3/6/18 - - oncology follow-up, DANTE Talavera. Cycle 4/day one FOLFIRINOX chemotherapy.    3/13/18 - - CT chest/abdomen/pelvis - - IMPRESSION: 1. Minimal decrease in size and marked decrease in enhancement and pancreatic  mass since the comparison study. 2. No significant change in low dense lesions in the liver since comparison.    3/16/18 - - oncology follow-up Dr. Mokn. Plan is to hold on further chemotherapy as surgery is scheduled for 4/17/18 4/17/18 - - surgery: PROCEDURE: Diagnostic laparoscopy, splenic flexure mobilization, intraoperative ultrasound, distal pancreatectomy, splenectomy, and cholecystectomy. SURGEON: Ja Byrd MD  Final surgical pathology showed necrotizing pancreatitis, no evidence of residual cancer, complete pathologic response, 26 benign lymph nodes.  FINAL DIAGNOSIS: A. DISTAL PANCREAS, SPLEEN AND OMENTUM, RESECTION: - Necrotizing pancreatitis with residual acellular mucin and foci of high grade dysplasia, status post neoadjuvant therapy   - Treatment response: complete (score 0)   - Twenty-six benign lymph nodes (0/26)   - Negative for residual carcinoma   - Pathologic staging: [id number]   - Focal infarction, metaplastic bone formation - Surgical margins are free of neoplasia - Unremarkable spleen and omentum   B. GALLBLADDER, CHOLECYSTECTOMY: - Benign gallbladder, biliary mucosa with no significant histologic abnormality - Negative for dysplasia COMMENT: Histologic sections demonstrate pools of acellular mucin, necrosis and scattered high grade dysplasia (PanIN-3) with no evidence of residual invasive carcinoma    4/30/18 - - surgical oncology follow-up, Dr. Byrd.  5/4/18 - - palliative care follow-up Dr. Snow. No specific recommendations required at this time.  5/14/18 - - oncology follow-up, Dr. Monk. Plan: adjuvant chemotherapy with gemcitabine and Xeloda.  5/30/18 - - cycle 1/day one adjuvant chemotherapy with gemcitabine and Xeloda.  Cycle three was dose reduced due to mucositis secondary to Xeloda.  8/28/18 - - oncology follow-up, DANTE Talavera. Cycle 4/day one gemcitabine and Xeloda.  9/17/18 - - CT chest/abdomen/pelvis - -IMPRESSION:  Resolving postoperative changes. No new  findings or evidence of metastatic disease.  9/24/18 - - oncology follow-up, Dr. Monk. PLAN: initiate active surveillance.  12/21/18--CT c/a/p--IMPRESSION:  1. Stable tiny 2 to 3 mm lung nodules as described above. This  suggests a benign etiology.  2. Two low density liver lesions. These appear to vary in appearance  depending on slight variations in timing of the bolus of contrast.  Likely no significant change. Recommend continued close surveillance.  3. No evidence of recurrent mass in the abdomen or pelvis. No  adenopathy.  12/28/18--scheduled oncology follow-up, Dr Monk. PATIENT NO-SHOW.  12/31/18--oncology follow-up, Dr Monk. Plan: Continue surveillance.  Increase Creon dose due to fatty food intolerance/weight loss.    3/29/19--CT c/a/p-- IMPRESSION: Stable scan compared to 12/21/2018 with the following  findings:  1. Several bilateral small pulmonary nodules, unchanged in appearance.  2. Two right hepatic small hypodense lesions, possibly hemangiomas.  These are also stable in appearance.  3. No apparent recurrent mass.  4/5/19 - - oncology follow-up, Dr. Monk.     7/1/19--CT c/a/p--IMPRESSION:  Stable examination with no convincing metastatic or  recurrent neoplasm.  7/8/19--oncology follow-up, Dr. Monk.    9/30/19 - - CT chest/abdomen/pelvis - - IMPRESSION:  1.  There is a small grouped area of nodularity in the right middle  lobe. Grouped nature favors an inflammatory etiology.  2.  The abdomen CT is stable with no convincing metastatic or  recurrent neoplasm.  10/7/19 - - oncology follow-up, Dr. Monk.    12/13/19 - - CT chest/abdomen/pelvis - - IMPRESSION:  1.  Stable abdomen/pelvis CT with no convincing metastatic or  recurrent neoplasm.  2. New/additional small focus of grouped nodularity in the right  middle lobe. Inflammatory etiology is still favored.  12/20/19 - - oncology follow-up, Dr. Monk.    4/13/20--CT c/a/p--IMPRESSION:  Interval resolution of suspected inflammatory change in right middle lobe.  Otherwise, stable examination.       4/20/20--oncology follow-up, Dr. Monk.      8/3/20--CT c/a/p--IMPRESSION:  Stable examination with no convincing metastatic or  recurrent neoplasm.    August 10, 2020 -- oncology follow-up/virtual visit, Dr. Monk.  12/8/20--CT c/a/p--IMPRESSION:  1.  Stable appearance of distal pancreatectomy and splenectomy.  2.  Stable tiny nodules through both lungs.  3.  New area of likely atelectasis in the posterior left apex  measuring 6 mm. Follow-up would be recommended in six months or to  correspond with the next routine oncologic follow-up.  4.  Two stable small ill-defined lesions in the right hepatic lobe.    December 11, 2020 -- oncology follow-up/virtual visit, Dr. Monk.    3/29/21--CT c/a/p--                                                               IMPRESSION:  1.  Stable appearance of distal pancreatectomy and splenectomy.  2.  The questioned nodular area in the posterior left apex on the  previous exam has nearly completely resolved.  3.  There is a new 8 mm slightly lobulated nodular opacity in the  anterior left lower lobe. Given the appearance over the course of  three months this is likely benign, but follow-up is recommended.  4.  Interval slight increase in size of level 1 and level 2 left  axillary lymph nodes that are now prominent to borderline enlarged.  These could be due to reactive process, but attention is recommended  in future exams.  5. Two low-attenuation areas are again identified in the liver. One of  these appears minimally increased in size, but these are both still  below 1 cm in size. Continued attention to this area would be  recommended, but these are likely benign.    April 5, 2021 -- oncology follow-up/virtual visit, Dr. Monk.    July 28, 2021 --wedge resection surgery for new lung nodule seen on scan:  Final Diagnosis   Lung, left lower lobe, wedge resection:  -Carcinoid tumorlet, 0.3 cm  -Focal neuroendocrine cell hyperplasia  -Granulomatous  inflammation with central necrosis          11/22/21--CT chest: IMPRESSION:  Resolving postop change in the left lung. No new finding.  11/22/21--MRI abdomen --IMPRESSION: In this patient with history of pancreatic neuroendocrine  tumor status post distal pancreatectomy and splenectomy, again noted a  few small hepatic lesions which demonstrate no significant change in  size as compared to prior studies dating back to 12/13/2019 exam.  Although these are too small to characterize, they are likely benign  given their interval stability. No new suspicious focal hepatic  lesion. Recommend continued attention on follow-up.       November 29, 2021 -- oncology follow-up/virtual visit, Dr. Monk.    4/12/22--CT chest - IMPRESSION:   1.  No interval change to most recent comparison dated 11/22/2021.  2.  Status post left lower lobe wedge resection.  3.  A few unchanged punctate pulmonary nodules, likely benign.  4.  No new or enlarging pulmonary nodules.    4/12/22-MRI abdomen--IMPRESSION: In this patient with history of pancreatic neuroendocrine [sic]  tumor status post distal pancreatectomy and splenectomy, again noted a  few small hepatic lesions which demonstrate no significant change in  size as compared to prior studies dating back to 12/13/2019 exam.  Although these are too small to characterize, they are likely benign  given their interval stability. No new suspicious focal hepatic  Lesion.    April 22, 2022 -- oncology follow-up/virtual visit, Dr. Monk.    10/11/22--CT c/a/p--IMPRESSION:  1.  No evidence of recurrent or metastatic disease.  2.  Similar 2 mm pulmonary micronodules.  3.  Stable pancreatectomy and splenectomy.  4.  Left lower lobe wedge resection.    October 17, 2022 -- oncology follow-up/virtual visit, Dr. Monk.      April 6, 2023--CT c/a/p--IMPRESSION:  1.  No evidence of recurrent or metastatic disease.  2.  A new small cluster of small pulmonary nodules in the right middle lobe is most likely  infectious or inflammatory. Recommend attention on follow-up imaging.    April 10, 2023 -- oncology follow-up/virtual visit, Dr. Monk.  23--CT c/a/p--IMPRESSION:  1.  No evidence for recurrent or metastatic disease in the chest, abdomen, or pelvis.    2023 -- oncology follow-up/virtual visit, Dr. Monk.        Interim History:  Ms. Bangura joins me from her home for a virtual video visit.      This month marks five years since completion of postoperative chemotherapy, and 5 1/2 years since surgical resection of her pancreas adenocarcinoma.  She has abdominal pain that wakes her up 2 to 3 times per month, with cramping sensations accompanied by diarrhea.  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  She meets with Dr. Romeo from our gastroenterology team, and he recently performed upper endoscopy  for evaluation of her symptoms.  Biopsies only showed mild glycogenic acanthosis, with no evidence of esophagitis.  At this point, the working diagnosis is possible dysmotility syndrome, which be not likely related to her pancreas cancer and treatment.  Her last colonoscopy was performed 2018.    Her recent CT scan, completed on  shows no evidence of cancer recurrence.       Latest Reference Range & Units 21 09:44 22 11:57 10/11/22 13:08 23 11:06   Cancer Antigen 19-9 <=35 U/mL 3 <2 <2 <2      Latest Reference Range & Units 21 09:10 21 09:44 22 11:57   Cancer Antigen 19-9 <=35 U/mL 2 [1] 3 [2] <2 [3]     Review Of Systems: Comprehensive 14-point ROS reviewed. Pertinent symptoms reviewed above per HPI.    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 She has a 30+ yr-old son. Works as . former smoker, quit 30 years ago. two glasses of wine per night, none since pancreatitis diagnosis     Allergies: none      Current Outpatient Medications:     acetaminophen (TYLENOL) 500 MG tablet, Take 2 tablets (1,000 mg) by mouth every 8 hours, Disp: 100 tablet, Rfl: 1    alcohol swab prep pads, Use to swab area of injection/avel as directed., Disp: 100 each, Rfl: 3    amylase-lipase-protease (CREON) 45400-61426 units CPEP per EC capsule, TAKE 4 CAPSULES WITH MEALS AND 2 CAPSULES WITH SNACKS, UP TO 16 CAPSULES PER DAY, Disp: 1440 capsule, Rfl: 3    blood glucose (NO BRAND SPECIFIED) test strip, Use to test blood sugar 2 times daily or as directed. To accompany: Blood Glucose Monitor Brands: per insurance., Disp: 100 strip, Rfl: 6    blood glucose (ONETOUCH VERIO IQ) test strip, Use to test blood sugar 4 times daily or as directed., Disp: 400 each, Rfl: 3    blood glucose calibration (NO BRAND SPECIFIED) solution, To accompany: Blood Glucose Monitor Brands: per insurance., Disp: 1 each, Rfl: 3    blood glucose monitoring (NO BRAND SPECIFIED) meter device kit, Use to test blood sugar 2 times daily or as directed. Preferred blood glucose meter OR supplies to accompany: Blood Glucose Monitor Brands: per insurance., Disp: 1 kit, Rfl: 0    clobetasol (TEMOVATE) 0.05 % GEL topical gel, Apply topically 2 times daily, Disp: 30 g, Rfl: 1    clobetasol (TEMOVATE) 0.05 % GEL topical gel, Apply topically 2 times daily, Disp: 15 g, Rfl: 1    gabapentin (NEURONTIN) 100 MG capsule, Take 3 capsules (300 mg) by mouth 3 times daily, Disp: 270 capsule, Rfl: 3    metFORMIN (GLUCOPHAGE XR) 500 MG 24 hr tablet, Take 1 tablet (500 mg) by mouth 2 times daily (with meals),  Disp: 180 tablet, Rfl: 3    OneTouch Delica Lancets 33G MISC, 4 lancets daily, Disp: 150 each, Rfl: 3    thin (NO BRAND SPECIFIED) lancets, Use with lanceting device. To accompany: Blood Glucose Monitor Brands: per insurance., Disp: 100 each, Rfl: 6  No current facility-administered medications for this visit.    Facility-Administered Medications Ordered in Other Visits:     heparin 100 UNIT/ML injection 5 mL, 5 mL, Intracatheter, Once, Art Guevara MD      Physical Exam:    In-person physical exam could not be performed today in context of a Virtual Visit. Observed physical assessments made today by visualizing the patient by video link:  Vitals - Patient Reported  Pain Score: No Pain (0)             General/Constitutional: Generally appears well, not acutely ill.  HEENT: no scleral icterus, not jaundiced.  Respiratory: no labored breathing.  Musculoskeletal: appears to have full range of motion and adequate physical strength.  Skin: no jaundice, discoloration or other notable lesions.  Neurological: no evidence of tremors.  Psychiatric: no evident anxiety; fully alert and oriented with fluent speech.      The rest of a comprehensive physical examination is deferred due to nature of video visits.    Laboratory/Imaging Studies  Prior to and including the day of this visit, I personally reviewed the recent imaging scans. I released the pertinent recent imaging results to [employer] in advance of this visit, and reviewed the summary verbatim and in lay language during today's call.      ASSESSMENT/PLAN:  Mrs. Bangura is a 62 year old woman with resected pancreatic tail adenocarcinoma.  She had initial extensive and severe necrotizing pancreatitis upon initial diagnosis and hospitalization that delayed treatment.  We ultimately were able to begin neoadjuvant FOLFIRINOX for 4 cycles beginning in January.  By the time she had pancreatectomy by Dr. Byrd in mid April there was no evidence of residual carcinoma  seen on operative pathology.  She completed subsequently 4 months of gemcitabine and Xeloda in the adjuvant setting.        She had a wedge resection of her lung-based carcinoid tumor that is 3 mm in diameter.  This finding was completely unrelated to her pancreatic adenocarcinoma, which was resected April 2018.    Once again, I congratulated her on her long-term survivorship, and this month marks five years since completion of adjuvant chemotherapy.  At this time we will transition her, with her permission, to our long-term cancer survivorship clinic, with appointment to be scheduled in late spring of 2024.  I advised her to let us know if she develops any symptoms not explained by other medical issues that may warrant further evaluation within oncology clinic. She will follow up with her primary care physician for noncancer related issues, and we briefly discussed advice regarding seasonal influenza vaccination, and she asked me at hepatitis B boosters that she will discuss with her primary care physician as well.      VIRTUAL VISIT - DETAILS:    I have reviewed the note as documented above. This accurately captures the substance of my conversation with the patient.    Date of call: September 11, 2023   Start of call: 8:45 am  End of call: 8:57 am    Provider location: [facility] (academic office)  Patient location: Home      Mode of Video Visit: Amwell           I spent 12 minutes in consultation, including history and discussion with the patient including review of recent lab values and radiologic imaging results.  An additional 17 minutes was spent on the day of the visit, including reviewing pertinent medical notes and documentation from other physicians and APPs who have evaluated and treated this patient, pertinent lab values, pathology and imaging results, personal review of radiologic images, discussing the case with referring providers and/or nurse coordinator team, post-visit orders, and all post-visit  documentation.    Jovany Monk MD PhD

## 2023-09-11 NOTE — NURSING NOTE
Is the patient currently in the state of [place]? YES    Visit mode:VIDEO    If the visit is dropped, the patient can be reconnected by: VIDEO VISIT: Send to e-mail at: [email or web address]    Will anyone else be joining the visit? NO  (If patient encounters technical issues they should call [phone]    How would you like to obtain your AVS? MyChart    Are changes needed to the allergy or medication list? Pt declined allergy review and Pt declined med review    Reason for visit: MICHELINE MONTE

## 2023-10-09 ENCOUNTER — VIRTUAL VISIT (OUTPATIENT)
Dept: GASTROENTEROLOGY | Facility: CLINIC | Age: 63
End: 2023-10-09
Attending: INTERNAL MEDICINE
Payer: COMMERCIAL

## 2023-10-09 VITALS — WEIGHT: 134 LBS | BODY MASS INDEX: 22.3 KG/M2

## 2023-10-09 DIAGNOSIS — R13.19 ESOPHAGEAL DYSPHAGIA: ICD-10-CM

## 2023-10-09 DIAGNOSIS — L43.8 ORAL LICHEN PLANUS: Primary | ICD-10-CM

## 2023-10-09 PROCEDURE — 99214 OFFICE O/P EST MOD 30 MIN: CPT | Mod: VID | Performed by: INTERNAL MEDICINE

## 2023-10-09 ASSESSMENT — PAIN SCALES - GENERAL: PAINLEVEL: NO PAIN (0)

## 2023-10-09 NOTE — LETTER
10/9/2023         RE: Kitty Bangura  [address] [facility] [address]        Dear Colleague,    Thank you for referring your patient, Kitty Bangura, to the [facility] [place]. Please see a copy of my visit note below.    Kitty Bangura is a 62 year old female who is being evaluated via a billable video visit.        Referred by: Ced  / Chris [facility] [id number]  Patient Care Team:  Kathy Jurado APRN CNP as PCP - General  Gagan Arrington RN as Clinic Care Coordinator  Vito Sanches MD as MD (Gastroenterology)  Jovany Monk MD as MD (Oncology)  Es Gallo MD as MD (INTERNAL MEDICINE - ENDOCRINOLOGY, DIABETES & METABOLISM)  Jovany Monk MD as Assigned Cancer Care Provider  Oxana Knox RN as Registered Nurse (Nurse)  Kj Orellana RN as Specialty Care Coordinator (Hematology & Oncology)  Enrique Ward MD as MD (Otolaryngology)  Romero Winston DO as MD (Gastroenterology)  Bret Coughlin MD as MD (Otolaryngology)  Kathy Jurado APRN CNP as Assigned PCP  Enrique Ward MD as Assigned Surgical Provider  Romero Winston DO as Assigned Gastroenterology Provider    History of Present Illness:   Kitty Bangura is a 62 year old female with oral lichen planus, 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 Coughlin with a chief complaint of dysphagia.  ---------------------------------------------------------------    10/9/23  Symptoms are the same regarding swallowing. Dysphagia occurs 2-3 times a week with primarily hot or cold liquids. When solids sit int he mid chest she has pain associated. The patient is uncertain if she has acid reflux. Denies heartburn. She reports being on antireflux medication while hospitalized for pancreatic cancer surgery. She occasionally feels  "better when propped up and feels a gurgling when lying back. She has tried to avoid acidic foods.      See updated BEDQ and Eckardt score below: These patient reported outcomes are pertinent to the HPI and have personally been reviewed.    --------------------------------------------  6/6/23  Kitty Bangura states when she underwent swallow study and prior to that she has felt some issues feeling as if food \"hangs out\" in the esophagus and backs up. It occurs occasionally, not all the time. This has been ongoing for 2-3 years. She will have a pain when drinking liquids on occasion. This is not temperature dependent. It is sharp and doesn't last long. It gradually dissipates. If she reclines she can feel and hear her esophagus gurgle. This can happen once or a few times before it goes away. Chews very carefully and thoroughly. Symptoms of dysphagia occur with both solids and liquids. No known heartburn.     Oral lichen planus managed by avoiding acidic and spicy foods. If it flares, which is associated with stress, she will have an increase in oral ulcers and takes clobetasol swish and swallow.     See updated BEDQ and Eckardt score below: These patient reported outcomes are pertinent to the HPI and have personally been reviewed.    ---------------------------------------------------------------       Wt Readings from Last 5 Encounters:   10/09/23 60.8 kg (134 lb)   09/11/23 60.8 kg (134 lb)   08/25/23 60.8 kg (134 lb)   06/06/23 60.3 kg (133 lb)   01/24/23 62 kg (136 lb 11.2 oz)        Esophageal Questionnaire(s)    BEDQ Questionnaire      5/30/2023    12:42 PM 9/29/2023    11:31 AM 10/4/2023     1:55 PM   BEDQ Questionnaire: How Often Have You Had the Following?   Trouble eating solid food (meat, bread, vegetables) 0 0 1   Trouble eating soft foods (yogurt, jello, pudding) 0 0 0   Trouble swallowing liquids 0 2 2   Pain while swallowing 2 2 2   Coughing or choking while swallowing foods or liquids 0 0 1   Total " Score: 2 4 6         5/30/2023    12:42 PM 9/29/2023    11:31 AM 10/4/2023     1:55 PM   BEDQ Questionnaire: Discomfort/Pain Ratings   Eating solid food (meat, bread, vegetables) 3 3 2   Eating soft foods (yogurt, jello, pudding) 2 1 0   Drinking liquid 3 4 4   Total Score: 8 8 6       Eckardt Questionnaire      5/30/2023    12:44 PM 9/29/2023    11:32 AM   Eckardt Questionnaire   Dysphagia 1 1   Regurgitation 1 1   Retrosternal Pain 1 1   Weight Loss (kg) 1 0   Total Score:  4 3       Promis 10 Questionnaire      5/30/2023    12:46 PM 9/29/2023    11:28 AM   PROMIS 10 FLOWSHEET DATA   In general, would you say your health is: 4 4    4   In general, would you say your quality of life is: 4 4    4   In general, how would you rate your physical health? 4 4    4   In general, how would you rate your mental health, including your mood and your ability to think? 4 3    3   In general, how would you rate your satisfaction with your social activities and relationships? 3 3    3   In general, please rate how well you carry out your usual social activities and roles. (This includes activities at home, at work and in your community, and responsibilities as a parent, child, spouse, employee, friend, etc.) 3 3    3   To what extent are you able to carry out your everyday physical activities such as walking, climbing stairs, carrying groceries, or moving a chair? 5 5    5   In the past 7 days, how often have you been bothered by emotional problems such as feeling anxious, depressed, or irritable? 3 3    3   In the past 7 days, how would you rate your fatigue on average? 2 3    3   In the past 7 days, how would you rate your pain on average, where 0 means no pain, and 10 means worst imaginable pain? 2 6    6   Mental health question re-calculation - no clinical value 3 3    3   Physical health question re-calculation - no clinical value 4 3    3   Pain question re-calculation - no clinical value 4 3    3   Global Mental Health  Score 14 13    13   Global Physical Health Score 17 15    15   PROMIS TOTAL - SUBSCORES 31 28    28     STUDIES & PROCEDURES:    EGD:   8/25/23   The examined esophagus was normal. Biopsies were taken with a cold        forceps for histology. Biopsies were taken with a cold forceps for        histology in the distal and proximal esophagus.        The entire examined stomach was normal.        The gastroesophageal flap valve was visualized endoscopically and        classified as Hill Grade III (minimal fold, loose to endoscope, hiatal        hernia likely).        The duodenal bulb and second portion of the duodenum were normal.                                                                                    Impression:            - Normal esophagus. Biopsied.                          - Normal stomach.                          - Gastroesophageal flap valve classified as Hill Grade                          III (minimal fold, loose to endoscope, hiatal hernia                          likely).                          - Normal duodenal bulb and second portion of the                          duodenum.   Recommendation:        - Await pathology results.   Pathology  A.  ESOPHAGUS, DISTAL, BIOPSY:  - Squamous mucosa with mild glycogenic acanthosis  - No evidence of eosinophilic esophagitis  - Lichenoid features are not identified     B.  ESOPHAGUS, PROXIMAL, BIOPSY:  - Squamous mucosa with mild glycogenic acanthosis  - No evidence of eosinophilic esophagitis  - Lichenoid features are not identified    EGD  Date: 5/8/18  Impression: A plastic stent was found on the posterior wall of the gastric body        consistent with previous transgastric drainage of WON. Stent removal was        accomplished with a rat-toothed forceps.        A previously placed plastic stent was seen in the area of the papilla.        Stent removal was accomplished with a rat-toothed forceps.                                                                                      Impression:          - Pre-existing gastric stent, removed.                        - Plastic stent in the duodenum. Removed.   Recommendation:      - Return to referring physician as previously scheduled.                                                                                     Pathology Report:    Colonoscopy:  Date:  Impression:  Pathology Report:     EndoFLIP directed at the UES or LES (8cm (EF-325) balloon length or 16cm (EF-322) balloon length):   Date:  8cm balloon  Balloon inflation Balloon pressure CSA (mm^2) DI (mm^2/mmHg) Dmin (mm) Compliance   20 (ladmark ID)        30        40        50           16cm balloon  Balloon inflation Balloon pressure CSA (mm^2) DI (mm^2/mmHg) Dmin (mm) Compliance   30 (ladmark ID)        40        50        60        70           High Resolution Manometry:  Date:  Impression:    PH/Impedance:  Date:  Impression:     Bravo:  48 or 96hr  Date:  Impression:    CT C/A/P:  Date: 4/6/23  Impression:  IMPRESSION:  1.  No evidence of recurrent or metastatic disease.  2.  A new small cluster of small pulmonary nodules in the right middle lobe is most likely infectious or inflammatory. Recommend attention on follow-up imaging.    Modified barium Esophagram:  Date: 12/9/22  Impression:    On the AP view, stasis of the barium tablet was noted in the mid  esophagus, which passes to the distal esophagus with subsequent liquid  rinse. The tablet eventually passes to the stomach with additional  multiple liquid rinse. There was symmetric bolus transit. Delayed  transit of the pudding consistency barium noted in distal esophagus.  No appreciable focal luminal narrowing.     Impression:   1. No aspiration or penetration with any of the administered  consistencies.  2. Please see the speech pathologist report for additional details.    Prior medical records were reviewed including, but not limited to, notes from referring providers, lab work, radiographic  tests, and other diagnostic tests. Pertinent results were summarized above.     History     Past Medical History:   Diagnosis Date    Arthritis Post chemo    Suspected    Diabetes (H)     History of blood transfusion     History of total splenectomy 04/17/2018    Necrotizing pancreatitis     Necrotizing pancreatitis 12/9/2017    Pancreatic cancer (H)     Pancreatic mass 11/1/2017    On CT abd/pelvis: 3 cm heterogeneous mass within the tail of the pancreas. A few calcifications are noted along the lateral margin of the mass.    Pneumonia     2016    PONV (postoperative nausea and vomiting)        Past Surgical History:   Procedure Laterality Date    ABDOMEN SURGERY  1983    Ruptured tubal pregnancies, additional surgeries followed    BACK SURGERY  2004    L5, S1 discectomy    BREAST SURGERY  2003    Breast reduction    COLONOSCOPY  2008    COLONOSCOPY N/A 12/11/2018    Procedure: colonoscopy;  Surgeon: Lobito Marte MD;  Location: [facility]    DAVINCI LOBECTOMY LUNG Left 7/28/2021    Procedure: Robot-assisted thoracoscopic left lower lobe wedge resection;  Surgeon: René Phillips MD;  Location: [facility]    ENDOSCOPIC RETROGRADE CHOLANGIOPANCREATOGRAM N/A 1/25/2018    Procedure: COMBINED ENDOSCOPIC RETROGRADE CHOLANGIOPANCREATOGRAPHY, PLACE TUBE/STENT;  Endoscopic retrograde cholangiopancreatogram with stent placement and cyst drainage bile ;  Surgeon: Vito Sanches MD;  Location: [facility]    ENDOSCOPIC ULTRASOUND LOWER GASTROINTESTIONAL TRACT (GI) N/A 1/25/2018    Procedure: ENDOSCOPIC ULTRASOUND LOWER GASTROINTESTIONAL TRACT (GI);  Endoscopic Ultrasound with guided Cyst-Gastrostomy;  Surgeon: González Metz MD;  Location: [facility]    ENDOSCOPIC ULTRASOUND, ESOPHAGOSCOPY, GASTROSCOPY, DUODENOSCOPY (EGD), NECROSECTOMY N/A 12/4/2017    Procedure: ENDOSCOPIC ULTRASOUND, ESOPHAGOSCOPY, GASTROSCOPY, DUODENOSCOPY (EGD), NECROSECTOMY;  Esophagogastroduodenoscopy, with  Necrosectomy and stents replacement  ;  Surgeon:  González Metz MD;  Location: [facility]    ENDOSCOPIC ULTRASOUND, ESOPHAGOSCOPY, GASTROSCOPY, DUODENOSCOPY (EGD), NECROSECTOMY N/A 2017    Procedure: ENDOSCOPIC ULTRASOUND, ESOPHAGOSCOPY, GASTROSCOPY, DUODENOSCOPY (EGD), NECROSECTOMY;  Esophagogastroduodenoscopy with Necrosectomy, Balloon Dilation, stent removal X3 and Cystgastrostomy stent Placement X2;  Surgeon: Guru Cecilia Staples MD;  Location: [facility]    ESOPHAGOSCOPY, GASTROSCOPY, DUODENOSCOPY (EGD), COMBINED N/A 2017    Procedure: COMBINED ENDOSCOPIC ULTRASOUND, ESOPHAGOSCOPY, GASTROSCOPY, DUODENOSCOPY (EGD), FINE NEEDLE ASPIRATE/BIOPSY;  Endoscopic Ultrasound with cystgastrostomy and fine needle aspiration ;  Surgeon: González Metz MD;  Location: [facility]    ESOPHAGOSCOPY, GASTROSCOPY, DUODENOSCOPY (EGD), COMBINED N/A 2018    Procedure: COMBINED ESOPHAGOSCOPY, GASTROSCOPY, DUODENOSCOPY (EGD);  EGD;  Surgeon: González Metz MD;  Location: [facility]    ESOPHAGOSCOPY, GASTROSCOPY, DUODENOSCOPY (EGD), COMBINED N/A 2018    Procedure: COMBINED ESOPHAGOSCOPY, GASTROSCOPY, DUODENOSCOPY (EGD), REMOVE FOREIGN BODY;;  Surgeon: González Metz MD;  Location: [facility]    ESOPHAGOSCOPY, GASTROSCOPY, DUODENOSCOPY (EGD), COMBINED N/A 2023    Procedure: ESOPHAGOGASTRODUODENOSCOPY, WITH BIOPSY;  Surgeon: Diaz Romeo MD;  Location: [facility] OR    GYN SURGERY  1983    Multiple ectopic pregnancies, reconnect,  1988    INSERT PORT VASCULAR ACCESS Right 2018    Procedure: INSERT PORT VASCULAR ACCESS;  Chest Port Placement - right;  Surgeon: Chanda Lawson PA-C;  [facility]    IR PORT REMOVAL RIGHT  2019    LAPAROSCOPY DIAGNOSTIC (GENERAL) N/A 2018    Procedure: LAPAROSCOPY DIAGNOSTIC (GENERAL);  Diagnostic Laparoscopy; Open Distal Pancreatectomy And Splenectomy, splenic flexure mobilization, cholecystectomy, intraoperative ultrasound;  Surgeon: Ja Byrd MD;  Location: [facility]    MAMMOPLASTY  REDUCTION Bilateral 2006    PANCREATECTOMY, SPLENECTOMY N/A 2018    Procedure: PANCREATECTOMY, SPLENECTOMY;;  Surgeon: Ja Byrd MD;  Location: [facility]    MI LAMNOTMY INCL W/DCMPRSN NRV ROOT 1 INTRSPC LUMBR      Description: Hemilaminectomy With Disc Removal One Lumbar Interspace;  Recorded: 2008;  Comments: Right L5-S1 with resultant loss of right patellar and achilles reflex    REMOVE PORT VASCULAR ACCESS Right 2019    Procedure: Right Port Removal;  Surgeon: Juan J Donaldson PA-C;  Location:  OR    [facility]  DELIVERY ONLY      Description:  Section;  Recorded: 12/10/2009;       Social History     Socioeconomic History    Marital status:      Spouse name: Not on file    Number of children: Not on file    Years of education: Not on file    Highest education level: Not on file   Occupational History    Not on file   Tobacco Use    Smoking status: Former     Packs/day: 0.50     Years: 5.00     Pack years: 2.50     Types: Cigarettes     Start date: 1974     Quit date:      Years since quittin.7    Smokeless tobacco: Never   Vaping Use    Vaping Use: Never used   Substance and Sexual Activity    Alcohol use: No     Alcohol/week: 21.0 standard drinks of alcohol     Comment: Now quit since Oct 31.  Moderate drinker in past    Drug use: No    Sexual activity: Not Currently     Partners: Male     Birth control/protection: Post-menopausal   Other Topics Concern    Parent/sibling w/ CABG, MI or angioplasty before 65F 55M? Yes     Comment: Brother   Social History Narrative    .  Lives with .  Works as a .     1 son.      No family history of bleeding, clotting disorders or complications with anesthesia.     Social Determinants of Health     Financial Resource Strain: Low Risk  (2021)    Overall Financial Resource Strain (CARDIA)     Difficulty of Paying Living Expenses: Not very hard   Food Insecurity: No Food Insecurity (2021)    Hunger  Vital Sign     Worried About Running Out of Food in the Last Year: Never true     Ran Out of Food in the Last Year: Never true   Transportation Needs: No Transportation Needs (11/7/2021)    PRAPARE - Transportation     Lack of Transportation (Medical): No     Lack of Transportation (Non-Medical): No   Physical Activity: Sufficiently Active (11/7/2021)    Exercise Vital Sign     Days of Exercise per Week: 7 days     Minutes of Exercise per Session: 40 min   Stress: No Stress Concern Present (11/7/2021)    Citizen of [place] [employer] - Occupational Stress Questionnaire     Feeling of Stress : Only a little   Social Connections: Unknown (11/7/2021)    Social Connection and Isolation Panel [NHANES]     Frequency of Communication with Friends and Family: Once a week     Frequency of Social Gatherings with Friends and Family: Once a week     Attends Scientology Services: Patient refused     Active Member of Clubs or Organizations: No     Attends Club or Organization Meetings: Not on file     Marital Status:    Interpersonal Safety: Not on file   Housing Stability: Low Risk  (11/7/2021)    Housing Stability Vital Sign     Unable to Pay for Housing in the Last Year: No     Number of Places Lived in the Last Year: 1     Unstable Housing in the Last Year: No       Family History   Problem Relation Age of Onset    Heart Disease Father     Hyperlipidemia Father     Heart Disease Brother     Diabetes Mother     Hypertension Mother     Obesity Mother     Depression Mother     Diabetes Maternal Grandfather     Hypertension Maternal Grandfather     Obesity Maternal Grandfather     Diabetes Sister     Hypertension Sister     Depression Sister     Anxiety Disorder Sister     Obesity Sister     Hypertension Brother     Hyperlipidemia Brother     Heart Disease Brother     Breast Cancer Cousin     Breast Cancer Cousin     Breast Cancer Cousin     Colon Cancer Other     Other Cancer Other         Mesothelioma     Depression Sister     Anxiety Disorder Brother     Cancer Brother 64        colon cancer    Anxiety Disorder Son     Depression Son     Mental Illness Sister         Schizophrenia    Hypertension Sister     Obesity Maternal Grandmother     Obesity Sister     Diabetes Nephew     Hypertension Brother      Family history reviewed and edited as appropriate    Medications and Allergies:     Outpatient Encounter Medications as of 10/9/2023   Medication Sig Dispense Refill    acetaminophen (TYLENOL) 500 MG tablet Take 2 tablets (1,000 mg) by mouth every 8 hours 100 tablet 1    alcohol swab prep pads Use to swab area of injection/avel as directed. 100 each 3    amylase-lipase-protease (CREON) 67573-55205 units CPEP per EC capsule TAKE 4 CAPSULES WITH MEALS AND 2 CAPSULES WITH SNACKS, UP TO 16 CAPSULES PER DAY 1440 capsule 3    blood glucose (NO BRAND SPECIFIED) test strip Use to test blood sugar 2 times daily or as directed. To accompany: Blood Glucose Monitor Brands: per insurance. 100 strip 6    blood glucose (ONETOUCH VERIO IQ) test strip Use to test blood sugar 4 times daily or as directed. 400 each 3    blood glucose calibration (NO BRAND SPECIFIED) solution To accompany: Blood Glucose Monitor Brands: per insurance. 1 each 3    blood glucose monitoring (NO BRAND SPECIFIED) meter device kit Use to test blood sugar 2 times daily or as directed. Preferred blood glucose meter OR supplies to accompany: Blood Glucose Monitor Brands: per insurance. 1 kit 0    clobetasol (TEMOVATE) 0.05 % GEL topical gel Apply topically 2 times daily 30 g 1    clobetasol (TEMOVATE) 0.05 % GEL topical gel Apply topically 2 times daily 15 g 1    gabapentin (NEURONTIN) 100 MG capsule Take 3 capsules (300 mg) by mouth 3 times daily 270 capsule 3    metFORMIN (GLUCOPHAGE XR) 500 MG 24 hr tablet Take 1 tablet (500 mg) by mouth 2 times daily (with meals) 180 tablet 3    OneTouch Delica Lancets 33G MISC 4 lancets daily 150 each 3    thin (NO BRAND  SPECIFIED) lancets Use with lanceting device. To accompany: Blood Glucose Monitor Brands: per insurance. 100 each 6     Facility-Administered Encounter Medications as of 10/9/2023   Medication Dose Route Frequency Provider Last Rate Last Admin    heparin 100 UNIT/ML injection 5 mL  5 mL Intracatheter Once Art Guevara MD            No Known Allergies     Review of systems:  A full 10 point review of systems was obtained and was negative except for the pertinent positives and negatives stated within the HPI.    Objective Findings:   Physical Exam:    Constitutional: Wt 60.8 kg (134 lb)   BMI 22.30 kg/m    General: Alert, cooperative, no distress, well-appearing  Head: Atraumatic, normocephalic, no obvious abnormalities   Eyes: EOMI, Sclera anicteric, no obvious conjunctival hemorrhage   Nose: Nares normal, no obvious malformation, no obvious rhinorrhea   Respiratory: normal appearing respirations no cough  Musculoskeletal: Range of motion intact, no obvious strength deficit  Skin: No jaundice, no obvious rash  Neurologic: AAOx3, no obvious neurologic abnormality  Psychiatric: Normal Affect, appropriate mood  Extremities: No obvious edema, no obvious malformation     Labs, Radiology, Pathology     Lab Results   Component Value Date    WBC 7.6 11/11/2022    WBC 10.5 08/06/2021    WBC 17.6 (H) 07/28/2021    HGB 13.1 11/11/2022    HGB 11.8 08/06/2021    HGB 11.4 (L) 07/28/2021     11/11/2022     (H) 08/06/2021     07/28/2021    CHOL 165 11/11/2022    CHOL 171 11/09/2021    CHOL 191 10/23/2020    TRIG 78 11/11/2022    TRIG 93 11/09/2021    TRIG 104 10/23/2020    HDL 70 11/11/2022    HDL 73 11/09/2021    HDL 87 10/23/2020    ALT 13 11/11/2022    ALT 30 08/06/2021    ALT 21 06/28/2021    AST 24 11/11/2022    AST 16 08/06/2021    AST 20 06/28/2021     11/11/2022     08/06/2021     07/28/2021    BUN 17.7 11/11/2022    BUN 14 08/06/2021    BUN 17 07/28/2021    CO2 27  11/11/2022    CO2 28 08/06/2021    CO2 29 07/28/2021    TSH 2.05 11/11/2022    TSH 1.48 04/30/2019    INR 1.01 07/08/2019    INR 1.24 (H) 04/17/2018    INR 0.99 04/02/2018        Liver Function Studies -   Recent Labs   Lab Test 11/11/22  1019   PROTTOTAL 7.2   ALBUMIN 4.3   BILITOTAL 0.5   ALKPHOS 76   AST 24   ALT 13          Patient Active Problem List    Diagnosis Date Noted    Esophageal dysphagia 06/06/2023     Priority: Medium    Alcohol dependence (H) 03/28/2023     Priority: Medium    Leiomyoma of uterus, unspecified 07/26/2021     Priority: Medium     Formatting of this note might be different from the original.  Created by Conversion      s/p LLL wedge - 7/28 07/21/2021     Priority: Medium     Added automatically from request for surgery [id number]      Oral lichen planus 12/01/2019     Priority: Medium    Drug-induced polyneuropathy (H24) 10/14/2019     Priority: Medium    Type 2 diabetes mellitus without complication, without long-term current use of insulin (H) 10/05/2018     Priority: Medium     Graduated from Endocrine Clinic 7/22/2019      Anemia 04/18/2018     Priority: Medium    Thrombocytopenia (H24) 04/18/2018     Priority: Medium    Asplenia after surgical procedure 04/18/2018     Priority: Medium    Pancreatic adenocarcinoma (H) 11/29/2017     Priority: Medium     TS completed 10.4 for TR 6.24 appt      Leukocytosis 11/01/2017     Priority: Medium    Fatty liver 11/01/2017     Priority: Medium     Seen on US 11/01/2017        Assessment and Plan   Assessment:    Kitty Bangura is a 62 year old female with oral lichen planus, cystic pancreatil tail adenocarcinoma, DM, trigeminal neuralgia, splenectomy who is presenting as a follow up patient in consultation at the request of Dr. Coughlin with a chief complaint of dysphagia.    The patient was seen in video telemedicine consultation regarding her symptoms of intermittent dysphagia to both solids and liquids.      The patient underwent EGD with no  findings of esophageal lichen planus on biopsy. Currently the patient's symptoms are not so severe that she is interested in pursuing HRM or 24hr pH impedance. I counseled the patient that we can wait for a 6 month follow up and if her symptoms are the same or worse it may be worthwhile to at least pursue timed barium esophagram with a barium tablet if she is still uncertain about pursuing manometry and 24hr pH impedance off therapy. The patient was also encouraged to reach out if she either changes her mind prior to the six month follow up or if her symptoms worsen prior to that time.     1. Oral lichen planus    2. Esophageal dysphagia         No orders of the defined types were placed in this encounter.     Plan:  Follow up in six months to discuss timed barium esophagram with barium tablet vs high resolution esophageal manometry with 24hr pH impedance off of PPI therapy    Follow up plan:   Return to clinic 6 months and as needed.    The risks and benefits of my recommendations, as well as other treatment options were discussed with the patient and any available family today. All questions were answered.     Follow up: As planned above. Today, I personally spent 17 minutes in direct face to face time with the patient, of which greater than 50% of the time was spent in patient education and counseling as described above. Approximately 15 minutes were spent on indirect care associated with the patient's consultation including but not limited to review of: patient medical records to date, clinic visits, hospital records, lab results, imaging studies, procedural documentation, and coordinating care with other providers. The findings from this review are summarized in the above note. All of the above accounted for a cumulative time of 32 minutes and was performed on the date of service.     The patient verbalized understanding of the plan and was appreciative for the time spent and information provided during the  office visit.     Author:   Romero Winston DO   of Medicine  Director, Esophageal Disorders Program  Division of Gastroenterology, Hepatology, and Nutrition  [facility]    Dr. Winston speaks for [employer] regarding dupilumab and has participated in advisory boards for the medication. When discussing the medication, patients were informed of Dr. Winston's role and potential conflict of interest.     Documentation assisted by voice recognition and documentation system.      Again, thank you for allowing me to participate in the care of your patient.      Sincerely,    Romero Winston DO

## 2023-10-09 NOTE — PATIENT INSTRUCTIONS
It was a pleasure taking care of you today.  I've included a brief summary of our discussion and care plan from today's visit below.  Please review this information with your primary care provider.  _______________________________________________________________________    My recommendations are summarized as follows:    Follow up in six months to discuss timed barium esophagram with barium tablet vs high resolution esophageal manometry with 24hr pH impedance off of PPI therapy    To schedule endoscopic procedures you may call: [phone]  To schedule radiology tests you may call: [phone]  To schedule an ENT appointment you may call: [phone]    Please call my nurse care coordinator Chano ([phone]) or Amy ([phone]), with any questions or concerns.  If you were seen through the [facility] please feel free to reach out to Vanessa at [phone]   --    Return to GI Clinic in 6 months to review your progress.    _______________________________________________________________________    Who do I call with any questions after my visit?  Please be in touch if there are any further questions that arise following today's visit.  There are multiple ways to contact your gastroenterology care team.      During business hours, you may reach a Gastroenterology nurse at [phone] and choose option 3.       To schedule or reschedule an appointment, please call [phone].     You can always send a secure message through [employer].  [employer] messages are answered by your nurse or doctor typically within 24 hours.  Please allow extra time on weekends and holidays.      For urgent/emergent questions after business hours, you may reach the on-call GI Fellow by contacting the [facility] at [phone].     How will I get the results of any tests ordered?    You will receive all of your results.  If you have signed up for MyChart, any tests ordered at your visit will be available to you  after your physician reviews them.  Typically this takes 1-2 weeks.  If there are urgent results that require a change in your care plan, your physician or nurse will call you to discuss the next steps.      What is [employer]?  [employer] is a secure way for you to access all of your healthcare records from the [facility].  It is a web based computer program, so you can sign on to it from any location.  It also allows you to send secure messages to your care team.  I recommend signing up for [employer] access if you have not already done so and are comfortable with using a computer.      How to I schedule a follow-up visit?  If you did not schedule a follow-up visit today, please call [phone] to schedule a follow-up office visit.      If you feel you received exceptional care and are interested in supporting the clinical and research goals of Dr. Winston or the Division of Gastroenterology, Hepatology, and Nutrition please contact him directly through [employer]  to discuss opportunities to donate.    Sincerely,    Romero Winston DO   of Medicine  Director, Esophageal Disorders Program  Division of Gastroenterology, Hepatology, and Nutrition  [facility]

## 2023-10-09 NOTE — PROGRESS NOTES
Kitty Bangura is a 62 year old female who is being evaluated via a billable video visit.    Virtual Visit Details    Type of service:  Video Visit   Video Start Time: 1:16 PM  Video End Time:1:33 PM    Originating Location (pt. Location): Home    Distant Location (provider location):  On-site  Platform used for Video Visit: [facility]  Reason for Visit:  chief complaint    Referred by: [facility]  / [facility] [id number]  Patient Care Team:  Kathy Jurado APRN CNP as PCP - General  Gagan Arrington RN as Clinic Care Coordinator  Vito Sanches MD as MD (Gastroenterology)  Jovany Monk MD as MD (Oncology)  Es Gallo MD as MD (INTERNAL MEDICINE - ENDOCRINOLOGY, DIABETES & METABOLISM)  Jovany Monk MD as Assigned Cancer Care Provider  Oxana Knox RN as Registered Nurse (Nurse)  Kj Orellana RN as Specialty Care Coordinator (Hematology & Oncology)  Enrique Ward MD as MD (Otolaryngology)  Romero Winston DO as MD (Gastroenterology)  Bret Coughlin MD as MD (Otolaryngology)  Kathy Jurado APRN CNP as Assigned PCP  Enrique Ward MD as Assigned Surgical Provider  Romero Winston DO as Assigned Gastroenterology Provider    History of Present Illness:   Kitty Bangura is a 62 year old female with oral lichen planus, 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 Coughlin with a chief complaint of dysphagia.  ---------------------------------------------------------------    10/9/23  Symptoms are the same regarding swallowing. Dysphagia occurs 2-3 times a week with primarily hot or cold liquids. When solids sit int he mid chest she has pain associated. The patient is uncertain if she has acid reflux. Denies heartburn. She reports being on antireflux medication while hospitalized for pancreatic cancer surgery. She occasionally  "feels better when propped up and feels a gurgling when lying back. She has tried to avoid acidic foods.      See updated BEDQ and Eckardt score below: These patient reported outcomes are pertinent to the HPI and have personally been reviewed.    --------------------------------------------  6/6/23  Kitty Bangura states when she underwent swallow study and prior to that she has felt some issues feeling as if food \"hangs out\" in the esophagus and backs up. It occurs occasionally, not all the time. This has been ongoing for 2-3 years. She will have a pain when drinking liquids on occasion. This is not temperature dependent. It is sharp and doesn't last long. It gradually dissipates. If she reclines she can feel and hear her esophagus gurgle. This can happen once or a few times before it goes away. Chews very carefully and thoroughly. Symptoms of dysphagia occur with both solids and liquids. No known heartburn.     Oral lichen planus managed by avoiding acidic and spicy foods. If it flares, which is associated with stress, she will have an increase in oral ulcers and takes clobetasol swish and swallow.     See updated BEDQ and Eckardt score below: These patient reported outcomes are pertinent to the HPI and have personally been reviewed.    ---------------------------------------------------------------       Wt Readings from Last 5 Encounters:   10/09/23 60.8 kg (134 lb)   09/11/23 60.8 kg (134 lb)   08/25/23 60.8 kg (134 lb)   06/06/23 60.3 kg (133 lb)   01/24/23 62 kg (136 lb 11.2 oz)        Esophageal Questionnaire(s)    BEDQ Questionnaire      5/30/2023    12:42 PM 9/29/2023    11:31 AM 10/4/2023     1:55 PM   BEDQ Questionnaire: How Often Have You Had the Following?   Trouble eating solid food (meat, bread, vegetables) 0 0 1   Trouble eating soft foods (yogurt, jello, pudding) 0 0 0   Trouble swallowing liquids 0 2 2   Pain while swallowing 2 2 2   Coughing or choking while swallowing foods or liquids 0 0 1 "   Total Score: 2 4 6         5/30/2023    12:42 PM 9/29/2023    11:31 AM 10/4/2023     1:55 PM   BEDQ Questionnaire: Discomfort/Pain Ratings   Eating solid food (meat, bread, vegetables) 3 3 2   Eating soft foods (yogurt, jello, pudding) 2 1 0   Drinking liquid 3 4 4   Total Score: 8 8 6       Eckardt Questionnaire      5/30/2023    12:44 PM 9/29/2023    11:32 AM   Eckardt Questionnaire   Dysphagia 1 1   Regurgitation 1 1   Retrosternal Pain 1 1   Weight Loss (kg) 1 0   Total Score:  4 3       Promis 10 Questionnaire      5/30/2023    12:46 PM 9/29/2023    11:28 AM   PROMIS 10 FLOWSHEET DATA   In general, would you say your health is: 4 4    4   In general, would you say your quality of life is: 4 4    4   In general, how would you rate your physical health? 4 4    4   In general, how would you rate your mental health, including your mood and your ability to think? 4 3    3   In general, how would you rate your satisfaction with your social activities and relationships? 3 3    3   In general, please rate how well you carry out your usual social activities and roles. (This includes activities at home, at work and in your community, and responsibilities as a parent, child, spouse, employee, friend, etc.) 3 3    3   To what extent are you able to carry out your everyday physical activities such as walking, climbing stairs, carrying groceries, or moving a chair? 5 5    5   In the past 7 days, how often have you been bothered by emotional problems such as feeling anxious, depressed, or irritable? 3 3    3   In the past 7 days, how would you rate your fatigue on average? 2 3    3   In the past 7 days, how would you rate your pain on average, where 0 means no pain, and 10 means worst imaginable pain? 2 6    6   Mental health question re-calculation - no clinical value 3 3    3   Physical health question re-calculation - no clinical value 4 3    3   Pain question re-calculation - no clinical value 4 3    3   Global Mental  Health Score 14 13    13   Global Physical Health Score 17 15    15   PROMIS TOTAL - SUBSCORES 31 28    28     STUDIES & PROCEDURES:    EGD:   8/25/23   The examined esophagus was normal. Biopsies were taken with a cold        forceps for histology. Biopsies were taken with a cold forceps for        histology in the distal and proximal esophagus.        The entire examined stomach was normal.        The gastroesophageal flap valve was visualized endoscopically and        classified as Hill Grade III (minimal fold, loose to endoscope, hiatal        hernia likely).        The duodenal bulb and second portion of the duodenum were normal.                                                                                    Impression:            - Normal esophagus. Biopsied.                          - Normal stomach.                          - Gastroesophageal flap valve classified as Hill Grade                          III (minimal fold, loose to endoscope, hiatal hernia                          likely).                          - Normal duodenal bulb and second portion of the                          duodenum.   Recommendation:        - Await pathology results.   Pathology  A.  ESOPHAGUS, DISTAL, BIOPSY:  - Squamous mucosa with mild glycogenic acanthosis  - No evidence of eosinophilic esophagitis  - Lichenoid features are not identified     B.  ESOPHAGUS, PROXIMAL, BIOPSY:  - Squamous mucosa with mild glycogenic acanthosis  - No evidence of eosinophilic esophagitis  - Lichenoid features are not identified    EGD  Date: 5/8/18  Impression: A plastic stent was found on the posterior wall of the gastric body        consistent with previous transgastric drainage of WON. Stent removal was        accomplished with a rat-toothed forceps.        A previously placed plastic stent was seen in the area of the papilla.        Stent removal was accomplished with a rat-toothed forceps.                                                                                      Impression:          - Pre-existing gastric stent, removed.                        - Plastic stent in the duodenum. Removed.   Recommendation:      - Return to referring physician as previously scheduled.                                                                                     Pathology Report:    Colonoscopy:  Date:  Impression:  Pathology Report:     EndoFLIP directed at the UES or LES (8cm (EF-325) balloon length or 16cm (EF-322) balloon length):   Date:  8cm balloon  Balloon inflation Balloon pressure CSA (mm^2) DI (mm^2/mmHg) Dmin (mm) Compliance   20 (ladmark ID)        30        40        50           16cm balloon  Balloon inflation Balloon pressure CSA (mm^2) DI (mm^2/mmHg) Dmin (mm) Compliance   30 (ladmark ID)        40        50        60        70           High Resolution Manometry:  Date:  Impression:    PH/Impedance:  Date:  Impression:     Bravo:  48 or 96hr  Date:  Impression:    CT C/A/P:  Date: 4/6/23  Impression:  IMPRESSION:  1.  No evidence of recurrent or metastatic disease.  2.  A new small cluster of small pulmonary nodules in the right middle lobe is most likely infectious or inflammatory. Recommend attention on follow-up imaging.    Modified barium Esophagram:  Date: 12/9/22  Impression:    On the AP view, stasis of the barium tablet was noted in the mid  esophagus, which passes to the distal esophagus with subsequent liquid  rinse. The tablet eventually passes to the stomach with additional  multiple liquid rinse. There was symmetric bolus transit. Delayed  transit of the pudding consistency barium noted in distal esophagus.  No appreciable focal luminal narrowing.     Impression:   1. No aspiration or penetration with any of the administered  consistencies.  2. Please see the speech pathologist report for additional details.    Prior medical records were reviewed including, but not limited to, notes from referring providers, lab work,  radiographic tests, and other diagnostic tests. Pertinent results were summarized above.     History     Past Medical History:   Diagnosis Date    Arthritis Post chemo    Suspected    Diabetes (H)     History of blood transfusion     History of total splenectomy 04/17/2018    Necrotizing pancreatitis     Necrotizing pancreatitis 12/9/2017    Pancreatic cancer (H)     Pancreatic mass 11/1/2017    On CT abd/pelvis: 3 cm heterogeneous mass within the tail of the pancreas. A few calcifications are noted along the lateral margin of the mass.    Pneumonia     2016    PONV (postoperative nausea and vomiting)        Past Surgical History:   Procedure Laterality Date    ABDOMEN SURGERY  1983    Ruptured tubal pregnancies, additional surgeries followed    BACK SURGERY  2004    L5, S1 discectomy    BREAST SURGERY  2003    Breast reduction    COLONOSCOPY  2008    COLONOSCOPY N/A 12/11/2018    Procedure: colonoscopy;  Surgeon: Lobito Marte MD;  Location: [facility]    DAVINCI LOBECTOMY LUNG Left 7/28/2021    Procedure: Robot-assisted thoracoscopic left lower lobe wedge resection;  Surgeon: René Phillips MD;  Location: [facility]    ENDOSCOPIC RETROGRADE CHOLANGIOPANCREATOGRAM N/A 1/25/2018    Procedure: COMBINED ENDOSCOPIC RETROGRADE CHOLANGIOPANCREATOGRAPHY, PLACE TUBE/STENT;  Endoscopic retrograde cholangiopancreatogram with stent placement and cyst drainage bile ;  Surgeon: Vito Sanches MD;  Location: [facility]    ENDOSCOPIC ULTRASOUND LOWER GASTROINTESTIONAL TRACT (GI) N/A 1/25/2018    Procedure: ENDOSCOPIC ULTRASOUND LOWER GASTROINTESTIONAL TRACT (GI);  Endoscopic Ultrasound with guided Cyst-Gastrostomy;  Surgeon: González Metz MD;  Location: [facility]    ENDOSCOPIC ULTRASOUND, ESOPHAGOSCOPY, GASTROSCOPY, DUODENOSCOPY (EGD), NECROSECTOMY N/A 12/4/2017    Procedure: ENDOSCOPIC ULTRASOUND, ESOPHAGOSCOPY, GASTROSCOPY, DUODENOSCOPY (EGD), NECROSECTOMY;  Esophagogastroduodenoscopy, with  Necrosectomy and stents replacement   ;  Surgeon: González Metz MD;  Location: [facility]    ENDOSCOPIC ULTRASOUND, ESOPHAGOSCOPY, GASTROSCOPY, DUODENOSCOPY (EGD), NECROSECTOMY N/A 2017    Procedure: ENDOSCOPIC ULTRASOUND, ESOPHAGOSCOPY, GASTROSCOPY, DUODENOSCOPY (EGD), NECROSECTOMY;  Esophagogastroduodenoscopy with Necrosectomy, Balloon Dilation, stent removal X3 and Cystgastrostomy stent Placement X2;  Surgeon: Guru Cecilia Staples MD;  Location: [facility]    ESOPHAGOSCOPY, GASTROSCOPY, DUODENOSCOPY (EGD), COMBINED N/A 2017    Procedure: COMBINED ENDOSCOPIC ULTRASOUND, ESOPHAGOSCOPY, GASTROSCOPY, DUODENOSCOPY (EGD), FINE NEEDLE ASPIRATE/BIOPSY;  Endoscopic Ultrasound with cystgastrostomy and fine needle aspiration ;  Surgeon: González Metz MD;  Location: [facility]    ESOPHAGOSCOPY, GASTROSCOPY, DUODENOSCOPY (EGD), COMBINED N/A 2018    Procedure: COMBINED ESOPHAGOSCOPY, GASTROSCOPY, DUODENOSCOPY (EGD);  EGD;  Surgeon: González Metz MD;  Location: [facility]    ESOPHAGOSCOPY, GASTROSCOPY, DUODENOSCOPY (EGD), COMBINED N/A 2018    Procedure: COMBINED ESOPHAGOSCOPY, GASTROSCOPY, DUODENOSCOPY (EGD), REMOVE FOREIGN BODY;;  Surgeon: González Metz MD;  Location: [facility]    ESOPHAGOSCOPY, GASTROSCOPY, DUODENOSCOPY (EGD), COMBINED N/A 2023    Procedure: ESOPHAGOGASTRODUODENOSCOPY, WITH BIOPSY;  Surgeon: Diaz Romeo MD;  Location: [facility] OR    GYN SURGERY  1983    Multiple ectopic pregnancies, reconnect,  1988    INSERT PORT VASCULAR ACCESS Right 2018    Procedure: INSERT PORT VASCULAR ACCESS;  Chest Port Placement - right;  Surgeon: Chanda Lawson PA-C;  [facility]    IR PORT REMOVAL RIGHT  2019    LAPAROSCOPY DIAGNOSTIC (GENERAL) N/A 2018    Procedure: LAPAROSCOPY DIAGNOSTIC (GENERAL);  Diagnostic Laparoscopy; Open Distal Pancreatectomy And Splenectomy, splenic flexure mobilization, cholecystectomy, intraoperative ultrasound;  Surgeon: Ja Byrd MD;  Location:  OR     MAMMOPLASTY REDUCTION Bilateral     PANCREATECTOMY, SPLENECTOMY N/A 2018    Procedure: PANCREATECTOMY, SPLENECTOMY;;  Surgeon: Ja Byrd MD;  Location: [facility]    MS LAMNOTMY INCL W/DCMPRSN NRV ROOT 1 INTRSPC LUMBR      Description: Hemilaminectomy With Disc Removal One Lumbar Interspace;  Recorded: 2008;  Comments: Right L5-S1 with resultant loss of right patellar and achilles reflex    REMOVE PORT VASCULAR ACCESS Right 2019    Procedure: Right Port Removal;  Surgeon: Juan J Donaldson PA-C;  Location:  OR    [facility]  DELIVERY ONLY      Description:  Section;  Recorded: 12/10/2009;       Social History     Socioeconomic History    Marital status:      Spouse name: Not on file    Number of children: Not on file    Years of education: Not on file    Highest education level: Not on file   Occupational History    Not on file   Tobacco Use    Smoking status: Former     Packs/day: 0.50     Years: 5.00     Pack years: 2.50     Types: Cigarettes     Start date: 1974     Quit date:      Years since quittin.7    Smokeless tobacco: Never   Vaping Use    Vaping Use: Never used   Substance and Sexual Activity    Alcohol use: No     Alcohol/week: 21.0 standard drinks of alcohol     Comment: Now quit since Oct 31.  Moderate drinker in past    Drug use: No    Sexual activity: Not Currently     Partners: Male     Birth control/protection: Post-menopausal   Other Topics Concern    Parent/sibling w/ CABG, MI or angioplasty before 65F 55M? Yes     Comment: Brother   Social History Narrative    .  Lives with .  Works as a .     1 son.      No family history of bleeding, clotting disorders or complications with anesthesia.     Social Determinants of Health     Financial Resource Strain: Low Risk  (2021)    Overall Financial Resource Strain (CARDIA)     Difficulty of Paying Living Expenses: Not very hard   Food Insecurity: No Food Insecurity  (11/7/2021)    Hunger Vital Sign     Worried About Running Out of Food in the Last Year: Never true     Ran Out of Food in the Last Year: Never true   Transportation Needs: No Transportation Needs (11/7/2021)    PRAPARE - Transportation     Lack of Transportation (Medical): No     Lack of Transportation (Non-Medical): No   Physical Activity: Sufficiently Active (11/7/2021)    Exercise Vital Sign     Days of Exercise per Week: 7 days     Minutes of Exercise per Session: 40 min   Stress: No Stress Concern Present (11/7/2021)    [place] [employer] - Occupational Stress Questionnaire     Feeling of Stress : Only a little   Social Connections: Unknown (11/7/2021)    Social Connection and Isolation Panel [NHANES]     Frequency of Communication with Friends and Family: Once a week     Frequency of Social Gatherings with Friends and Family: Once a week     Attends Spiritism Services: Patient refused     Active Member of Clubs or Organizations: No     Attends Club or Organization Meetings: Not on file     Marital Status:    Interpersonal Safety: Not on file   Housing Stability: Low Risk  (11/7/2021)    Housing Stability Vital Sign     Unable to Pay for Housing in the Last Year: No     Number of Places Lived in the Last Year: 1     Unstable Housing in the Last Year: No       Family History   Problem Relation Age of Onset    Heart Disease Father     Hyperlipidemia Father     Heart Disease Brother     Diabetes Mother     Hypertension Mother     Obesity Mother     Depression Mother     Diabetes Maternal Grandfather     Hypertension Maternal Grandfather     Obesity Maternal Grandfather     Diabetes Sister     Hypertension Sister     Depression Sister     Anxiety Disorder Sister     Obesity Sister     Hypertension Brother     Hyperlipidemia Brother     Heart Disease Brother     Breast Cancer Cousin     Breast Cancer Cousin     Breast Cancer Cousin     Colon Cancer Other     Other Cancer Other          Mesothelioma    Depression Sister     Anxiety Disorder Brother     Cancer Brother 64        colon cancer    Anxiety Disorder Son     Depression Son     Mental Illness Sister         Schizophrenia    Hypertension Sister     Obesity Maternal Grandmother     Obesity Sister     Diabetes Nephew     Hypertension Brother      Family history reviewed and edited as appropriate    Medications and Allergies:     Outpatient Encounter Medications as of 10/9/2023   Medication Sig Dispense Refill    acetaminophen (TYLENOL) 500 MG tablet Take 2 tablets (1,000 mg) by mouth every 8 hours 100 tablet 1    alcohol swab prep pads Use to swab area of injection/avel as directed. 100 each 3    amylase-lipase-protease (CREON) 22591-32832 units CPEP per EC capsule TAKE 4 CAPSULES WITH MEALS AND 2 CAPSULES WITH SNACKS, UP TO 16 CAPSULES PER DAY 1440 capsule 3    blood glucose (NO BRAND SPECIFIED) test strip Use to test blood sugar 2 times daily or as directed. To accompany: Blood Glucose Monitor Brands: per insurance. 100 strip 6    blood glucose (ONETOUCH VERIO IQ) test strip Use to test blood sugar 4 times daily or as directed. 400 each 3    blood glucose calibration (NO BRAND SPECIFIED) solution To accompany: Blood Glucose Monitor Brands: per insurance. 1 each 3    blood glucose monitoring (NO BRAND SPECIFIED) meter device kit Use to test blood sugar 2 times daily or as directed. Preferred blood glucose meter OR supplies to accompany: Blood Glucose Monitor Brands: per insurance. 1 kit 0    clobetasol (TEMOVATE) 0.05 % GEL topical gel Apply topically 2 times daily 30 g 1    clobetasol (TEMOVATE) 0.05 % GEL topical gel Apply topically 2 times daily 15 g 1    gabapentin (NEURONTIN) 100 MG capsule Take 3 capsules (300 mg) by mouth 3 times daily 270 capsule 3    metFORMIN (GLUCOPHAGE XR) 500 MG 24 hr tablet Take 1 tablet (500 mg) by mouth 2 times daily (with meals) 180 tablet 3    OneTouch Delica Lancets 33G MISC 4 lancets daily 150 each 3     thin (NO BRAND SPECIFIED) lancets Use with lanceting device. To accompany: Blood Glucose Monitor Brands: per insurance. 100 each 6     Facility-Administered Encounter Medications as of 10/9/2023   Medication Dose Route Frequency Provider Last Rate Last Admin    heparin 100 UNIT/ML injection 5 mL  5 mL Intracatheter Once Art Guevara MD            No Known Allergies     Review of systems:  A full 10 point review of systems was obtained and was negative except for the pertinent positives and negatives stated within the HPI.    Objective Findings:   Physical Exam:    Constitutional: Wt 60.8 kg (134 lb)   BMI 22.30 kg/m    General: Alert, cooperative, no distress, well-appearing  Head: Atraumatic, normocephalic, no obvious abnormalities   Eyes: EOMI, Sclera anicteric, no obvious conjunctival hemorrhage   Nose: Nares normal, no obvious malformation, no obvious rhinorrhea   Respiratory: normal appearing respirations no cough  Musculoskeletal: Range of motion intact, no obvious strength deficit  Skin: No jaundice, no obvious rash  Neurologic: AAOx3, no obvious neurologic abnormality  Psychiatric: Normal Affect, appropriate mood  Extremities: No obvious edema, no obvious malformation     Labs, Radiology, Pathology     Lab Results   Component Value Date    WBC 7.6 11/11/2022    WBC 10.5 08/06/2021    WBC 17.6 (H) 07/28/2021    HGB 13.1 11/11/2022    HGB 11.8 08/06/2021    HGB 11.4 (L) 07/28/2021     11/11/2022     (H) 08/06/2021     07/28/2021    CHOL 165 11/11/2022    CHOL 171 11/09/2021    CHOL 191 10/23/2020    TRIG 78 11/11/2022    TRIG 93 11/09/2021    TRIG 104 10/23/2020    HDL 70 11/11/2022    HDL 73 11/09/2021    HDL 87 10/23/2020    ALT 13 11/11/2022    ALT 30 08/06/2021    ALT 21 06/28/2021    AST 24 11/11/2022    AST 16 08/06/2021    AST 20 06/28/2021     11/11/2022     08/06/2021     07/28/2021    BUN 17.7 11/11/2022    BUN 14 08/06/2021    BUN 17 07/28/2021     CO2 27 11/11/2022    CO2 28 08/06/2021    CO2 29 07/28/2021    TSH 2.05 11/11/2022    TSH 1.48 04/30/2019    INR 1.01 07/08/2019    INR 1.24 (H) 04/17/2018    INR 0.99 04/02/2018        Liver Function Studies -   Recent Labs   Lab Test 11/11/22  1019   PROTTOTAL 7.2   ALBUMIN 4.3   BILITOTAL 0.5   ALKPHOS 76   AST 24   ALT 13          Patient Active Problem List    Diagnosis Date Noted    Esophageal dysphagia 06/06/2023     Priority: Medium    Alcohol dependence (H) 03/28/2023     Priority: Medium    Leiomyoma of uterus, unspecified 07/26/2021     Priority: Medium     Formatting of this note might be different from the original.  Created by Conversion      s/p LLL wedge - 7/28 07/21/2021     Priority: Medium     Added automatically from request for surgery [id number]      Oral lichen planus 12/01/2019     Priority: Medium    Drug-induced polyneuropathy (H24) 10/14/2019     Priority: Medium    Type 2 diabetes mellitus without complication, without long-term current use of insulin (H) 10/05/2018     Priority: Medium     Graduated from Endocrine Clinic 7/22/2019      Anemia 04/18/2018     Priority: Medium    Thrombocytopenia (H24) 04/18/2018     Priority: Medium    Asplenia after surgical procedure 04/18/2018     Priority: Medium    Pancreatic adenocarcinoma (H) 11/29/2017     Priority: Medium     TS completed 10.4 for TR 6.24 appt      Leukocytosis 11/01/2017     Priority: Medium    Fatty liver 11/01/2017     Priority: Medium     Seen on US 11/01/2017        Assessment and Plan   Assessment:    Kitty Bangura is a 62 year old female with oral lichen planus, cystic pancreatil tail adenocarcinoma, DM, trigeminal neuralgia, splenectomy who is presenting as a follow up patient in consultation at the request of Dr. Coughlin with a chief complaint of dysphagia.    The patient was seen in video telemedicine consultation regarding her symptoms of intermittent dysphagia to both solids and liquids.      The patient underwent EGD  with no findings of esophageal lichen planus on biopsy. Currently the patient's symptoms are not so severe that she is interested in pursuing HRM or 24hr pH impedance. I counseled the patient that we can wait for a 6 month follow up and if her symptoms are the same or worse it may be worthwhile to at least pursue timed barium esophagram with a barium tablet if she is still uncertain about pursuing manometry and 24hr pH impedance off therapy. The patient was also encouraged to reach out if she either changes her mind prior to the six month follow up or if her symptoms worsen prior to that time.     1. Oral lichen planus    2. Esophageal dysphagia         No orders of the defined types were placed in this encounter.     Plan:  Follow up in six months to discuss timed barium esophagram with barium tablet vs high resolution esophageal manometry with 24hr pH impedance off of PPI therapy    Follow up plan:   Return to clinic 6 months and as needed.    The risks and benefits of my recommendations, as well as other treatment options were discussed with the patient and any available family today. All questions were answered.     Follow up: As planned above. Today, I personally spent 17 minutes in direct face to face time with the patient, of which greater than 50% of the time was spent in patient education and counseling as described above. Approximately 15 minutes were spent on indirect care associated with the patient's consultation including but not limited to review of: patient medical records to date, clinic visits, hospital records, lab results, imaging studies, procedural documentation, and coordinating care with other providers. The findings from this review are summarized in the above note. All of the above accounted for a cumulative time of 32 minutes and was performed on the date of service.     The patient verbalized understanding of the plan and was appreciative for the time spent and information provided during  the office visit.     Author:   Romero Winston DO   of Medicine  Director, Esophageal Disorders Program  Division of Gastroenterology, Hepatology, and Nutrition  [facility]    Dr. Winston speaks for [employer] regarding dupilumab and has participated in advisory boards for the medication. When discussing the medication, patients were informed of Dr. Winston's role and potential conflict of interest.     Documentation assisted by voice recognition and documentation system.

## 2023-10-09 NOTE — NURSING NOTE
Is the patient currently in the state of [place]? YES    Visit mode:VIDEO    If the visit is dropped, the patient can be reconnected by: VIDEO VISIT: Text to cell phone:   Telephone Information:   Mobile [phone]       Will anyone else be joining the visit? NO  (If patient encounters technical issues they should call [phone])    How would you like to obtain your AVS? MyChart    Are changes needed to the allergy or medication list? No    Reason for visit: Video Visit (Recheck)    April MONTE

## 2023-10-13 ENCOUNTER — PATIENT OUTREACH (OUTPATIENT)
Dept: CARE COORDINATION | Facility: CLINIC | Age: 63
End: 2023-10-13
Payer: COMMERCIAL

## 2023-12-06 ASSESSMENT — ENCOUNTER SYMPTOMS
NAUSEA: 1
HEMATURIA: 0
SORE THROAT: 1
NERVOUS/ANXIOUS: 0
EYE PAIN: 0
FREQUENCY: 1
COUGH: 0
DIZZINESS: 0
MYALGIAS: 0
JOINT SWELLING: 0
PARESTHESIAS: 1
HEADACHES: 1
BREAST MASS: 0
ABDOMINAL PAIN: 1
HEMATOCHEZIA: 0
PALPITATIONS: 0
CHILLS: 0
HEARTBURN: 0
SHORTNESS OF BREATH: 0
CONSTIPATION: 0
DYSURIA: 0
DIARRHEA: 0
WEAKNESS: 0
ARTHRALGIAS: 0
FEVER: 0

## 2023-12-06 ASSESSMENT — ACTIVITIES OF DAILY LIVING (ADL): CURRENT_FUNCTION: NO ASSISTANCE NEEDED

## 2023-12-11 ENCOUNTER — OFFICE VISIT (OUTPATIENT)
Dept: FAMILY MEDICINE | Facility: CLINIC | Age: 63
End: 2023-12-11
Payer: COMMERCIAL

## 2023-12-11 VITALS
BODY MASS INDEX: 21.69 KG/M2 | DIASTOLIC BLOOD PRESSURE: 69 MMHG | HEIGHT: 66 IN | TEMPERATURE: 97.8 F | SYSTOLIC BLOOD PRESSURE: 104 MMHG | OXYGEN SATURATION: 94 % | HEART RATE: 61 BPM | RESPIRATION RATE: 14 BRPM | WEIGHT: 135 LBS

## 2023-12-11 DIAGNOSIS — G89.18 ACUTE POST-OPERATIVE PAIN: ICD-10-CM

## 2023-12-11 DIAGNOSIS — R13.19 ESOPHAGEAL DYSPHAGIA: ICD-10-CM

## 2023-12-11 DIAGNOSIS — E11.9 TYPE 2 DIABETES MELLITUS WITHOUT COMPLICATION, WITHOUT LONG-TERM CURRENT USE OF INSULIN (H): Primary | ICD-10-CM

## 2023-12-11 DIAGNOSIS — C25.9 PANCREATIC ADENOCARCINOMA (H): ICD-10-CM

## 2023-12-11 DIAGNOSIS — G62.0 DRUG-INDUCED POLYNEUROPATHY (H): ICD-10-CM

## 2023-12-11 DIAGNOSIS — Z00.00 MEDICARE ANNUAL WELLNESS VISIT, SUBSEQUENT: ICD-10-CM

## 2023-12-11 DIAGNOSIS — D69.6 THROMBOCYTOPENIA (H): ICD-10-CM

## 2023-12-11 DIAGNOSIS — Z78.0 POST-MENOPAUSAL: ICD-10-CM

## 2023-12-11 DIAGNOSIS — K76.0 FATTY LIVER: ICD-10-CM

## 2023-12-11 DIAGNOSIS — Z00.00 ENCOUNTER FOR MEDICARE ANNUAL WELLNESS EXAM: ICD-10-CM

## 2023-12-11 PROBLEM — F10.20 ALCOHOL DEPENDENCE (H): Status: RESOLVED | Noted: 2023-03-28 | Resolved: 2023-12-11

## 2023-12-11 LAB
ALT SERPL W P-5'-P-CCNC: 13 U/L (ref 0–50)
ANION GAP SERPL CALCULATED.3IONS-SCNC: 10 MMOL/L (ref 7–15)
AST SERPL W P-5'-P-CCNC: 25 U/L (ref 0–45)
BUN SERPL-MCNC: 12.8 MG/DL (ref 8–23)
CALCIUM SERPL-MCNC: 9.7 MG/DL (ref 8.8–10.2)
CHLORIDE SERPL-SCNC: 102 MMOL/L (ref 98–107)
CHOLEST SERPL-MCNC: 186 MG/DL
CREAT SERPL-MCNC: 0.66 MG/DL (ref 0.51–0.95)
DEPRECATED HCO3 PLAS-SCNC: 28 MMOL/L (ref 22–29)
EGFRCR SERPLBLD CKD-EPI 2021: >90 ML/MIN/1.73M2
ERYTHROCYTE [DISTWIDTH] IN BLOOD BY AUTOMATED COUNT: 13.2 % (ref 10–15)
FASTING STATUS PATIENT QL REPORTED: NORMAL
GLUCOSE SERPL-MCNC: 106 MG/DL (ref 70–99)
HBA1C MFR BLD: 5.9 % (ref 0–5.6)
HCT VFR BLD AUTO: 38.9 % (ref 35–47)
HDLC SERPL-MCNC: 77 MG/DL
HGB BLD-MCNC: 12.8 G/DL (ref 11.7–15.7)
LDLC SERPL CALC-MCNC: 93 MG/DL
MCH RBC QN AUTO: 31.1 PG (ref 26.5–33)
MCHC RBC AUTO-ENTMCNC: 32.9 G/DL (ref 31.5–36.5)
MCV RBC AUTO: 95 FL (ref 78–100)
NONHDLC SERPL-MCNC: 109 MG/DL
PLATELET # BLD AUTO: 419 10E3/UL (ref 150–450)
POTASSIUM SERPL-SCNC: 4.6 MMOL/L (ref 3.4–5.3)
RBC # BLD AUTO: 4.11 10E6/UL (ref 3.8–5.2)
SODIUM SERPL-SCNC: 140 MMOL/L (ref 135–145)
TRIGL SERPL-MCNC: 78 MG/DL
VIT D+METAB SERPL-MCNC: 50 NG/ML (ref 20–50)
WBC # BLD AUTO: 8.7 10E3/UL (ref 4–11)

## 2023-12-11 PROCEDURE — 36415 COLL VENOUS BLD VENIPUNCTURE: CPT | Performed by: NURSE PRACTITIONER

## 2023-12-11 PROCEDURE — 83036 HEMOGLOBIN GLYCOSYLATED A1C: CPT | Performed by: NURSE PRACTITIONER

## 2023-12-11 PROCEDURE — 82570 ASSAY OF URINE CREATININE: CPT | Performed by: NURSE PRACTITIONER

## 2023-12-11 PROCEDURE — 84460 ALANINE AMINO (ALT) (SGPT): CPT | Performed by: NURSE PRACTITIONER

## 2023-12-11 PROCEDURE — 84450 TRANSFERASE (AST) (SGOT): CPT | Performed by: NURSE PRACTITIONER

## 2023-12-11 PROCEDURE — G0439 PPPS, SUBSEQ VISIT: HCPCS | Performed by: NURSE PRACTITIONER

## 2023-12-11 PROCEDURE — 85027 COMPLETE CBC AUTOMATED: CPT | Performed by: NURSE PRACTITIONER

## 2023-12-11 PROCEDURE — 82306 VITAMIN D 25 HYDROXY: CPT | Mod: GZ | Performed by: NURSE PRACTITIONER

## 2023-12-11 PROCEDURE — G0009 ADMIN PNEUMOCOCCAL VACCINE: HCPCS | Performed by: NURSE PRACTITIONER

## 2023-12-11 PROCEDURE — 90677 PCV20 VACCINE IM: CPT | Performed by: NURSE PRACTITIONER

## 2023-12-11 PROCEDURE — 82043 UR ALBUMIN QUANTITATIVE: CPT | Performed by: NURSE PRACTITIONER

## 2023-12-11 PROCEDURE — 80061 LIPID PANEL: CPT | Performed by: NURSE PRACTITIONER

## 2023-12-11 PROCEDURE — 80048 BASIC METABOLIC PNL TOTAL CA: CPT | Performed by: NURSE PRACTITIONER

## 2023-12-11 PROCEDURE — 99214 OFFICE O/P EST MOD 30 MIN: CPT | Mod: 25 | Performed by: NURSE PRACTITIONER

## 2023-12-11 RX ORDER — METFORMIN HCL 500 MG
500 TABLET, EXTENDED RELEASE 24 HR ORAL 2 TIMES DAILY WITH MEALS
Qty: 180 TABLET | Refills: 3 | Status: SHIPPED | OUTPATIENT
Start: 2023-12-11

## 2023-12-11 RX ORDER — GABAPENTIN 100 MG/1
200 CAPSULE ORAL 2 TIMES DAILY
Qty: 360 CAPSULE | Refills: 3 | Status: SHIPPED | OUTPATIENT
Start: 2023-12-11

## 2023-12-11 ASSESSMENT — ENCOUNTER SYMPTOMS
PALPITATIONS: 0
FREQUENCY: 1
NAUSEA: 1
CONSTIPATION: 0
HEMATURIA: 0
WEAKNESS: 0
COUGH: 0
HEARTBURN: 0
JOINT SWELLING: 0
EYE PAIN: 0
HEMATOCHEZIA: 0
SORE THROAT: 1
DIARRHEA: 0
DIZZINESS: 0
HEADACHES: 1
DYSURIA: 0
ARTHRALGIAS: 0
CHILLS: 0
FEVER: 0
BREAST MASS: 0
ABDOMINAL PAIN: 1
PARESTHESIAS: 1
NERVOUS/ANXIOUS: 0
MYALGIAS: 0
SHORTNESS OF BREATH: 0

## 2023-12-11 ASSESSMENT — ACTIVITIES OF DAILY LIVING (ADL): CURRENT_FUNCTION: NO ASSISTANCE NEEDED

## 2023-12-11 NOTE — PATIENT INSTRUCTIONS
Patient Education   Personalized Prevention Plan  You are due for the preventive services outlined below.  Your care team is available to assist you in scheduling these services.  If you have already completed any of these items, please share that information with your care team to update in your medical record.  Health Maintenance Due   Topic Date Due     Osteoporosis Screening  Never done     Diabetic Foot Exam  Never done     Meningitis A Vaccine (2 - Risk 2-dose series) 05/28/2018     Eye Exam  01/12/2023     Basic Metabolic Panel  11/11/2023     Cholesterol Lab  11/11/2023     Kidney Microalbumin Urine Test  11/11/2023     Bladder Training: Care Instructions  Your Care Instructions     Bladder training is used to treat urge incontinence and stress incontinence. Urge incontinence means that the need to urinate comes on so fast that you can't get to a toilet in time. Stress incontinence means that you leak urine because of pressure on your bladder. For example, it may happen when you laugh, cough, or lift something heavy.  Bladder training can increase how long you can wait before you have to urinate. It can also help your bladder hold more urine. And it can give you better control over the urge to urinate.  It is important to remember that bladder training takes a few weeks to a few months to make a difference. You may not see results right away, but don't give up.  Follow-up care is a key part of your treatment and safety. Be sure to make and go to all appointments, and call your doctor if you are having problems. It's also a good idea to know your test results and keep a list of the medicines you take.  How can you care for yourself at home?  Work with your doctor to come up with a bladder training program that is right for you. You may use one or more of the following methods.  Delayed urination  In the beginning, try to keep from urinating for 5 minutes after you first feel the need to go.  While you wait,  "take deep, slow breaths to relax. Kegel exercises can also help you delay the need to go to the bathroom.  After some practice, when you can easily wait 5 minutes to urinate, try to wait 10 minutes before you urinate.  Slowly increase the waiting period until you are able to control when you have to urinate.  Scheduled urination  Empty your bladder when you first wake up in the morning.  Schedule times throughout the day when you will urinate.  Start by going to the bathroom every hour, even if you don't need to go.  Slowly increase the time between trips to the bathroom.  When you have found a schedule that works well for you, keep doing it.  If you wake up during the night and have to urinate, do it. Apply your schedule to waking hours only.  Kegel exercises  These tighten and strengthen pelvic muscles, which can help you control the flow of urine. (If doing these exercises causes pain, stop doing them and talk with your doctor.) To do Kegel exercises:  Squeeze your muscles as if you were trying not to pass gas. Or squeeze your muscles as if you were stopping the flow of urine. Your belly, legs, and buttocks shouldn't move.  Hold the squeeze for 3 seconds, then relax for 5 to 10 seconds.  Start with 3 seconds, then add 1 second each week until you are able to squeeze for 10 seconds.  Repeat the exercise 10 times a session. Do 3 to 8 sessions a day.  When should you call for help?  Watch closely for changes in your health, and be sure to contact your doctor if:    Your incontinence is getting worse.     You do not get better as expected.   Where can you learn more?  Go to https://[email or web address].net/patiented  Enter V684 in the search box to learn more about \"Bladder Training: Care Instructions.\"  Current as of: February 28, 2023               Content Version: 13.8    [id number] [employer].   Care instructions adapted under license by your healthcare professional. If you have questions about a medical " condition or this instruction, always ask your healthcare professional. [employer] disclaims any warranty or liability for your use of this information.

## 2023-12-11 NOTE — PROGRESS NOTES
"SUBJECTIVE:   Kitty is a 63 year old, presenting for the following:  Physical (Medicare wellness visit)    -She stated that overall she has been feeling well.  She has been following GI related to slow transit GI symptoms.  They wanted further testing, but, she would like to hold off on the testing for now.  She will notice that when she eats, she will feel nauseated.  This occurs daily.  The swallow test noted possible slow transit.  She denied vomiting or choking on food.  She has been following oncology, and the oncologist feels that her pancreatic cancer is stable.  She has been placed on gabapentin related to \" cold\" neuropathy since she started chemo.  She feels that the gabapentin has been helpful.  She feels that her feet are always feeling cold, her knees feel cold, and her hands.  Her feet feel good with exercise, and tend to warm up with exercise.  She denied any history of Raynaud's phenomenon.  She stated that the bottom of her feet can appear purpleish.  She does not have exercise intolerance, and her calves do not hurt with exercise.   -Her glucose levels are  fasting.       12/11/2023    10:10 AM   Additional Questions   Roomed by Hakeem       Are you in the first 12 months of your Medicare coverage?  No    Healthy Habits:     In general, how would you rate your overall health?  Good    Frequency of exercise:  6-7 days/week    Duration of exercise:  45-60 minutes    Do you usually eat at least 4 servings of fruit and vegetables a day, include whole grains    & fiber and avoid regularly eating high fat or \"junk\" foods?  Yes    Taking medications regularly:  Yes    Medication side effects:  Other    Ability to successfully perform activities of daily living:  No assistance needed    Home Safety:  No safety concerns identified    Hearing Impairment:  No hearing concerns    In the past 6 months, have you been bothered by leaking of urine? Yes    In general, how would you rate your overall mental or " emotional health?  Good    Additional concerns today:  Yes      Today's PHQ-2 Score:       2023    10:10 AM   PHQ-2 (  [employer])   Q1: Little interest or pleasure in doing things 0   Q2: Feeling down, depressed or hopeless 0   PHQ-2 Score 0   Q1: Little interest or pleasure in doing things Not at all   Q2: Feeling down, depressed or hopeless Not at all   PHQ-2 Score 0           Have you ever done Advance Care Planning? (For example, a Health Directive, POLST, or a discussion with a medical provider or your loved ones about your wishes): Yes, patient states has an Advance Care Planning document and will bring a copy to the clinic.       Fall risk  Fallen 2 or more times in the past year?: No  Any fall with injury in the past year?: No  click delete button to remove this line now  Cognitive Screening   1) Repeat 3 items (Leader, Season, Table)    2) Clock draw: NORMAL  3) 3 item recall: Recalls 3 objects  Results: 3 items recalled: COGNITIVE IMPAIRMENT LESS LIKELY    Mini-CogTM Copyright PAM Ramos. Licensed by the author for use in [facility]; reprinted with permission (ziyad@[facility]). All rights reserved.      Do you have sleep apnea, excessive snoring or daytime drowsiness? : no    Reviewed and updated as needed this visit by clinical staff   Tobacco  Allergies  Meds              Reviewed and updated as needed this visit by Provider                 Social History     Tobacco Use    Smoking status: Former     Packs/day: 0.50     Years: 5.00     Additional pack years: 0.00     Total pack years: 2.50     Types: Cigarettes     Start date: 1974     Quit date: 1981     Years since quittin.9     Passive exposure: Past    Smokeless tobacco: Never   Substance Use Topics    Alcohol use: No     Alcohol/week: 21.0 standard drinks of alcohol     Comment: Now quit since Oct 31.  Moderate drinker in past             2023     1:41 PM   Alcohol Use   Prescreen: >3 drinks/day or >7 drinks/week? Not  Applicable          No data to display              Do you have a current opioid prescription? No  Do you use any other controlled substances or medications that are not prescribed by a provider? None          Chief Complaint   Patient presents with    Physical     Medicare wellness visit        Current providers sharing in care for this patient include:   Patient Care Team:  Kathy Jurado APRN CNP as PCP - General  Gagan Arrington RN as Clinic Care Coordinator  Vito Sanches MD as MD (Gastroenterology)  Jovany Monk MD as MD (Oncology)  Es Gallo MD as MD (INTERNAL MEDICINE - ENDOCRINOLOGY, DIABETES & METABOLISM)  Jovany Monk MD as Assigned Cancer Care Provider  Oxana Knox, RN as Registered Nurse (Nurse)  Kj Orellana RN as Specialty Care Coordinator (Hematology & Oncology)  Enrique Ward MD as MD (Otolaryngology)  Romero Winston DO as MD (Gastroenterology)  Bret Coughlin MD as MD (Otolaryngology)  Kathy Jurado APRN CNP as Assigned PCP  Enrique Ward MD as Assigned Surgical Provider  Romero Winston DO as Assigned Gastroenterology Provider  Romero Winston DO as Physician (Gastroenterology)    The following health maintenance items are reviewed in Epic and correct as of today:  Health Maintenance   Topic Date Due    DEXA  Never done    DIABETIC FOOT EXAM  Never done    MENINGITIS IMMUNIZATION (2 - Risk 2-dose series) 05/28/2018    EYE EXAM  01/12/2023    Pneumococcal Vaccine: 65+ Years (3 - PPSV23 or PCV20) 04/02/2023    A1C  05/11/2023    BMP  11/11/2023    LIPID  11/11/2023    MICROALBUMIN  11/11/2023    ANNUAL REVIEW OF HM ORDERS  11/11/2023    MEDICARE ANNUAL WELLNESS VISIT  12/04/2023    FALL RISK ASSESSMENT  12/11/2024    MAMMO SCREENING  07/11/2025    ADVANCE CARE PLANNING  11/09/2026    COLORECTAL CANCER SCREENING  12/11/2028    DTAP/TDAP/TD IMMUNIZATION (3 - Td or Tdap) 10/24/2029    HEPATITIS C SCREENING  Completed    HIV SCREENING   Completed    PHQ-2 (once per calendar year)  Completed    INFLUENZA VACCINE  Completed    ZOSTER IMMUNIZATION  Completed    RSV VACCINE (Pregnancy & 60+)  Completed    COVID-19 Vaccine  Completed    IPV IMMUNIZATION  Aged Out    HPV IMMUNIZATION  Aged Out    RSV MONOCLONAL ANTIBODY  Aged Out    PAP  Discontinued     Lab work is in process  Labs reviewed in [facility]:       7/11/2023    10:16 AM   Breast CA Risk Assessment ([facility]-7)   Did any of your first-degree relatives have breast or ovarian cancer? No   Did any of your relatives have bilateral breast cancer? No   Did any man in your family have breast cancer? No   Did any woman in your family have breast and ovarian cancer? No   Did any woman in your family have breast cancer before age 50 y? Yes   Do you have 2 or more relatives with breast and/or ovarian cancer? Yes   Do you have 2 or more relatives with breast and/or bowel cancer? Yes     click delete button to remove this line now  Mammogram Screening: Recommended mammography every 1-2 years with patient discussion and risk factor consideration  Pertinent mammograms are reviewed under the imaging tab.    Review of Systems   Constitutional:  Negative for chills and fever.   HENT:  Positive for sore throat. Negative for congestion, ear pain and hearing loss.    Eyes:  Negative for pain and visual disturbance.   Respiratory:  Negative for cough and shortness of breath.    Cardiovascular:  Negative for chest pain, palpitations and peripheral edema.   Gastrointestinal:  Positive for abdominal pain and nausea. Negative for constipation, diarrhea, heartburn and hematochezia.   Breasts:  Negative for tenderness, breast mass and discharge.   Genitourinary:  Positive for frequency and pelvic pain. Negative for dysuria, genital sores, hematuria, urgency, vaginal bleeding and vaginal discharge.   Musculoskeletal:  Negative for arthralgias, joint swelling and myalgias.   Skin:  Negative for rash.   Neurological:   "Positive for headaches and paresthesias. Negative for dizziness and weakness.   Psychiatric/Behavioral:  Positive for mood changes. The patient is not nervous/anxious.          OBJECTIVE:   /69   Pulse 61   Temp 97.8  F (36.6  C) (Oral)   Resp 14   Ht 1.67 m (5' 5.75\")   Wt 61.2 kg (135 lb)   SpO2 94%   BMI 21.96 kg/m   Estimated body mass index is 22.3 kg/m  as calculated from the following:    Height as of 9/11/23: 1.651 m (5' 5\").    Weight as of 10/9/23: 60.8 kg (134 lb).  Physical Exam  GENERAL: healthy, alert and no distress  EYES: Eyes grossly normal to inspection, PERRL and conjunctivae and sclerae normal  NECK: no adenopathy, no asymmetry, masses, or scars and thyroid normal to palpation  RESP: lungs clear to auscultation - no rales, rhonchi or wheezes  CV: regular rate and rhythm, normal S1 S2, no S3 or S4, no murmur, click or rub, no peripheral edema and peripheral pulses strong  ABDOMEN: soft, nontender, no hepatosplenomegaly, no masses and bowel sounds normal  MS: no gross musculoskeletal defects noted, no edema  SKIN: no suspicious lesions or rashes  NEURO: Normal strength and tone, mentation intact and speech normal  PSYCH: mentation appears normal, affect normal/bright    Diagnostic Test Results:  Labs reviewed in Epic    ASSESSMENT / PLAN:       ICD-10-CM    1. Type 2 diabetes mellitus without complication, without long-term current use of insulin (H)  E11.9 HEMOGLOBIN A1C     BASIC METABOLIC PANEL     Lipid panel reflex to direct LDL Non-fasting     Albumin Random Urine Quantitative with Creat Ratio     metFORMIN (GLUCOPHAGE XR) 500 MG 24 hr tablet     CBC with platelets     HEMOGLOBIN A1C     BASIC METABOLIC PANEL     Lipid panel reflex to direct LDL Non-fasting     Albumin Random Urine Quantitative with Creat Ratio     CBC with platelets      2. Post-menopausal  Z78.0 DEXA HIP/PELVIS/SPINE - Future      3. Medicare annual wellness visit, subsequent  Z00.00 Vitamin D deficiency " screening     CBC with platelets     Vitamin D deficiency screening     CBC with platelets      4. Acute post-operative pain  G89.18       5. Drug-induced polyneuropathy (H24)  G62.0 gabapentin (NEURONTIN) 100 MG capsule     CBC with platelets     CBC with platelets      6. Fatty liver  K76.0 AST     ALT     CBC with platelets     AST     ALT     CBC with platelets      7. Pancreatic adenocarcinoma (H)  C25.9 CBC with platelets     CBC with platelets      8. Esophageal dysphagia  R13.19 CBC with platelets     CBC with platelets      9. Thrombocytopenia (H24)  D69.6       10. Encounter for Medicare annual wellness exam  Z00.00         -Labs appear stable. VSS.  Her previous ASCVD D score was discussed today.  Guidelines regarding statin use was also discussed.   -Your A1c is better!  This is great news!  Please continue with your exercise habits and diet.  You may also continue metformin.  Lets recheck this lab at least in 1 year.   -Your cholesterol is stable at 186.  Your HDL is awesome at 77.  Your 10-year ASCVD the risk score is still low at 6%.  Lets continue to monitor this lab, and not start a statin at this time since your A1c is now within the prediabetic category.  If you have any questions regarding this please let me know.   -Your CBC is stable.  No indication of anemia.  Your platelets are also stable.   -Your liver enzymes are stable and are not elevated.   -Your vitamin D level is at a good range, please continue the current dose of your vitamin D supplement.   -Cold fingers and feet discussed today.  A condition called Raynaud's phenomenon was also discussed, not certain that she has this condition.  I discussed that treatments are typically warm gloves warm socks.  With her blood pressure being on the lower range of normal, I do not recommend amlodipine.  This medication was discussed and patient also declined.  She can continue gabapentin since this medication has been effective.  She has been  slowly decreasing the dose and this is okay if she continues to do this.  Gabapentin has been refilled.     The 10-year ASCVD risk score (Tyler LEE, et al., 2019) is: 6%    Values used to calculate the score:      Age: 63 years      Sex: Female      Is Non- : No      Diabetic: Yes      Tobacco smoker: No      Systolic Blood Pressure: 104 mmHg      Is BP treated: No      HDL Cholesterol: 77 mg/dL      Total Cholesterol: 186 mg/dL      Patient has been advised of split billing requirements and indicates understanding: Yes      COUNSELING:  Reviewed preventive health counseling, as reflected in patient instructions        She reports that she quit smoking about 42 years ago. Her smoking use included cigarettes. She started smoking about 49 years ago. She has a 2.50 pack-year smoking history. She has been exposed to tobacco smoke. She has never used smokeless tobacco.      Appropriate preventive services were discussed with this patient, including applicable screening as appropriate for fall prevention, nutrition, physical activity, Tobacco-use cessation, weight loss and cognition.  Checklist reviewing preventive services available has been given to the patient.    Reviewed patients plan of care and provided an AVS. The Basic Care Plan (routine screening as documented in Health Maintenance) for Kitty meets the Care Plan requirement. This Care Plan has been established and reviewed with the Patient.          TYLER Prince [facility]    Identified Health Risks:  I have reviewed Opioid Use Disorder and Substance Use Disorder risk factors and made any needed referrals.

## 2023-12-12 LAB
CREAT UR-MCNC: 20.7 MG/DL
MICROALBUMIN UR-MCNC: <12 MG/L
MICROALBUMIN/CREAT UR: NORMAL MG/G{CREAT}

## 2023-12-12 NOTE — RESULT ENCOUNTER NOTE
Hi  It was good to see you again yesterday!    Your A1c is better!  This is great news!  Please continue with your exercise habits and diet.  You may also continue metformin.  Lets recheck this lab at least in 1 year.   -Your cholesterol is stable at 186.  Your HDL is awesome at 77.  Your 10-year ASCVD the risk score is still low at 6%.  Lets continue to monitor this lab, and not start a statin at this time since your A1c is now within the prediabetic category.  If you have any questions regarding this please let me know.   -Your CBC is stable.  No indication of anemia.  Your platelets are also stable.   -Your liver enzymes are stable and are not elevated.   -Your vitamin D level is at a good range, please continue the current dose of your vitamin D supplement.   -Cold fingers and feet discussed today.  A condition called Raynaud's phenomenon was also discussed, not certain that she has this condition.  I discussed that treatments are typically warm gloves warm socks.  With her blood pressure being on the lower range of normal, I do not recommend amlodipine.  This medication was discussed and patient also declined.  She can continue gabapentin since this medication has been effective.  She has been slowly decreasing the dose and this is okay if she continues to do this.  Gabapentin has been refilled.     The 10-year ASCVD risk score (Tyler LEE, et al., 2019) is: 6%    Values used to calculate the score:      Age: 63 years      Sex: Female      Is Non- : No      Diabetic: Yes      Tobacco smoker: No      Systolic Blood Pressure: 104 mmHg      Is BP treated: No      HDL Cholesterol: 77 mg/dL      Total Cholesterol: 186 mg/dL    Felisha

## 2023-12-28 NOTE — PATIENT INSTRUCTIONS
Kitty Bangura,    It was a pleasure to see you today.    1. You were seen in the ENT Clinic today by Wanda Caldera PA-C    If you have any questions or concerns after your appointment, please call   - Option 1: ENT Clinic: [phone]    2. Stop the dexamethasone mouth wash as it was causing side effects    3.  Continue the salt/soda gargles and follow that the Biotene mouth rinse.  Do this 3-4 times a day.  You can also apply the topical lidocaine after both rinses as needed.    4.  Please see Dr. Ward to discuss the possibility of removing this area as it is not healing.    5.  Follow up with me as needed.      Thank you,  Wanda Caldera PA-C  Otolaryngology  Head & Neck Surgery  [phone]              
Quality 226: Preventive Care And Screening: Tobacco Use: Screening And Cessation Intervention: Patient screened for tobacco use and is an ex/non-smoker
Detail Level: Detailed

## 2024-01-03 DIAGNOSIS — K85.91 NECROTIZING PANCREATITIS: ICD-10-CM

## 2024-01-15 DIAGNOSIS — K85.91 NECROTIZING PANCREATITIS: ICD-10-CM

## 2024-01-18 ENCOUNTER — ANCILLARY PROCEDURE (OUTPATIENT)
Dept: BONE DENSITY | Facility: CLINIC | Age: 64
End: 2024-01-18
Attending: NURSE PRACTITIONER
Payer: COMMERCIAL

## 2024-01-18 ENCOUNTER — MYC REFILL (OUTPATIENT)
Dept: FAMILY MEDICINE | Facility: CLINIC | Age: 64
End: 2024-01-18

## 2024-01-18 DIAGNOSIS — Z78.0 POST-MENOPAUSAL: ICD-10-CM

## 2024-01-18 DIAGNOSIS — E11.9 TYPE 2 DIABETES MELLITUS WITHOUT COMPLICATION, WITHOUT LONG-TERM CURRENT USE OF INSULIN (H): ICD-10-CM

## 2024-01-18 PROCEDURE — 77080 DXA BONE DENSITY AXIAL: CPT | Mod: TC | Performed by: PHYSICIAN ASSISTANT

## 2024-01-19 NOTE — RESULT ENCOUNTER NOTE
Hi    Your DEXA scan is noting osteopenia.  Please make sure you are taking at least 1000 mg of calcium per day, and at least 1000 to 2000 international units of vitamin D per day.  Please try to get regular exercise, and light weight lifting.    Recheck this DEXA scan at least in 2 years.     TYLER Prince CNP

## 2024-04-15 ENCOUNTER — VIRTUAL VISIT (OUTPATIENT)
Dept: GASTROENTEROLOGY | Facility: CLINIC | Age: 64
End: 2024-04-15
Attending: INTERNAL MEDICINE
Payer: COMMERCIAL

## 2024-04-15 DIAGNOSIS — R13.19 ESOPHAGEAL DYSPHAGIA: Primary | ICD-10-CM

## 2024-04-15 PROCEDURE — 99214 OFFICE O/P EST MOD 30 MIN: CPT | Mod: 95 | Performed by: INTERNAL MEDICINE

## 2024-04-15 NOTE — LETTER
"    4/15/2024         RE: Kitty Bangura  [address] [facility] [address]        Dear Colleague,    Thank you for referring your patient, Kitty Bangura, to the [facility] [place]. Please see a copy of my visit note below.      Reason for Visit:  chief complaint    Referred by: Ced  / Chris [facility] / [facility] [id number]  Patient Care Team:  Kathy Jurado APRN CNP as PCP - General  Gagan Arrington RN as Clinic Care Coordinator  Vito Sanches MD as MD (Gastroenterology)  Jovany Monk MD as MD (Oncology)  Es Gallo MD as MD (INTERNAL MEDICINE - ENDOCRINOLOGY, DIABETES & METABOLISM)  Jovany Monk MD as Assigned Cancer Care Provider  Oxana Knox RN as Registered Nurse (Nurse)  Kj Orellana RN as Specialty Care Coordinator (Hematology & Oncology)  Enrique Ward MD as MD (Otolaryngology)  Romero Winston DO as MD (Gastroenterology)  Bret Coughlin MD as MD (Otolaryngology)  Enrique Ward MD as Assigned Surgical Provider  Romero Winston DO as Assigned Gastroenterology Provider  Romero Winston DO as Physician (Gastroenterology)  Kathy Jurado APRN CNP as Assigned PCP    History of Present Illness:   Kitty Bangura is a 63 year old female with oral lichen planus, 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 Coughlin with a chief complaint of dysphagia.  ---------------------------------------------------------------  4/15/24  Patient states that she had an incident a couple weeks ago where she had dysphagia. She states that a lot of times she \"doesn't feel great\" after eating. That night the patient took an NSAID prior to bed. She felt that food was not going down that evening. She had a pain associated in the low part of her sternum. This lasted over 20 minutes. Drinking water " "didn't help, sat up in bed. 20-25 minutes before feeling better.     Feels overly full and uncomfortable following meals. No associated nausea or vomiting. Has not yet undergone gastric emptying study.     Currently coughing.     See updated BEDQ and Eckardt score below: These patient reported outcomes are pertinent to the HPI and have personally been reviewed.    --------------------------------------  10/9/23  Symptoms are the same regarding swallowing. Dysphagia occurs 2-3 times a week with primarily hot or cold liquids. When solids sit int he mid chest she has pain associated. The patient is uncertain if she has acid reflux. Denies heartburn. She reports being on antireflux medication while hospitalized for pancreatic cancer surgery. She occasionally feels better when propped up and feels a gurgling when lying back. She has tried to avoid acidic foods.      See updated BEDQ and Eckardt score below: These patient reported outcomes are pertinent to the HPI and have personally been reviewed.    --------------------------------------------  6/6/23  Kitty Bangura states when she underwent swallow study and prior to that she has felt some issues feeling as if food \"hangs out\" in the esophagus and backs up. It occurs occasionally, not all the time. This has been ongoing for 2-3 years. She will have a pain when drinking liquids on occasion. This is not temperature dependent. It is sharp and doesn't last long. It gradually dissipates. If she reclines she can feel and hear her esophagus gurgle. This can happen once or a few times before it goes away. Chews very carefully and thoroughly. Symptoms of dysphagia occur with both solids and liquids. No known heartburn.     Oral lichen planus managed by avoiding acidic and spicy foods. If it flares, which is associated with stress, she will have an increase in oral ulcers and takes clobetasol swish and swallow.     See updated BEDQ and Eckardt score below: These patient " reported outcomes are pertinent to the HPI and have personally been reviewed.    ---------------------------------------------------------------       Wt Readings from Last 5 Encounters:   12/11/23 61.2 kg (135 lb)   10/09/23 60.8 kg (134 lb)   09/11/23 60.8 kg (134 lb)   08/25/23 60.8 kg (134 lb)   06/06/23 60.3 kg (133 lb)        Esophageal Questionnaire(s)    BEDQ Questionnaire      5/30/2023    12:42 PM 9/29/2023    11:31 AM 10/4/2023     1:55 PM 4/9/2024    11:41 AM   BEDQ Questionnaire: How Often Have You Had the Following?   Trouble eating solid food (meat, bread, vegetables) 0 0 1 0   Trouble eating soft foods (yogurt, jello, pudding) 0 0 0 0   Trouble swallowing liquids 0 2 2 1   Pain while swallowing 2 2 2 1   Coughing or choking while swallowing foods or liquids 0 0 1 1   Total Score: 2 4 6 3         5/30/2023    12:42 PM 9/29/2023    11:31 AM 10/4/2023     1:55 PM 4/9/2024    11:41 AM   BEDQ Questionnaire: Discomfort/Pain Ratings   Eating solid food (meat, bread, vegetables) 3 3 2 1   Eating soft foods (yogurt, jello, pudding) 2 1 0 1   Drinking liquid 3 4 4 3   Total Score: 8 8 6 5       Eckardt Questionnaire      5/30/2023    12:44 PM 9/29/2023    11:32 AM 4/9/2024    11:42 AM   Eckardt Questionnaire   Dysphagia 1 1 1   Regurgitation 1 1 1   Retrosternal Pain 1 1 1   Weight Loss (kg) 1 0 0   Total Score:  4 3 3       Promis 10 Questionnaire      5/30/2023    12:46 PM 9/29/2023    11:28 AM 4/9/2024    11:44 AM   PROMIS 10 FLOWSHEET DATA   In general, would you say your health is: 4 4 4   In general, would you say your quality of life is: 4 4 4   In general, how would you rate your physical health? 4 4 4   In general, how would you rate your mental health, including your mood and your ability to think? 4 3 3   In general, how would you rate your satisfaction with your social activities and relationships? 3 3 3   In general, please rate how well you carry out your usual social activities and roles. (This  includes activities at home, at work and in your community, and responsibilities as a parent, child, spouse, employee, friend, etc.) 3 3 3   To what extent are you able to carry out your everyday physical activities such as walking, climbing stairs, carrying groceries, or moving a chair? 5 5 5   In the past 7 days, how often have you been bothered by emotional problems such as feeling anxious, depressed, or irritable? 3 3 3   In the past 7 days, how would you rate your fatigue on average? 2 3 2   In the past 7 days, how would you rate your pain on average, where 0 means no pain, and 10 means worst imaginable pain? 2 6 5   Mental health question re-calculation - no clinical value 3 3    3 3   Physical health question re-calculation - no clinical value 4 3    3 4   Pain question re-calculation - no clinical value 4 3    3 3   Global Mental Health Score 14 13    13 13   Global Physical Health Score 17 15    15 16   PROMIS TOTAL - SUBSCORES 31 28    28 29     STUDIES & PROCEDURES:    EGD:   8/25/23   The examined esophagus was normal. Biopsies were taken with a cold        forceps for histology. Biopsies were taken with a cold forceps for        histology in the distal and proximal esophagus.        The entire examined stomach was normal.        The gastroesophageal flap valve was visualized endoscopically and        classified as Hill Grade III (minimal fold, loose to endoscope, hiatal        hernia likely).        The duodenal bulb and second portion of the duodenum were normal.                                                                                    Impression:            - Normal esophagus. Biopsied.                          - Normal stomach.                          - Gastroesophageal flap valve classified as Hill Grade                          III (minimal fold, loose to endoscope, hiatal hernia                          likely).                          - Normal duodenal bulb and second portion of the                           duodenum.   Recommendation:        - Await pathology results.   Pathology  A.  ESOPHAGUS, DISTAL, BIOPSY:  - Squamous mucosa with mild glycogenic acanthosis  - No evidence of eosinophilic esophagitis  - Lichenoid features are not identified     B.  ESOPHAGUS, PROXIMAL, BIOPSY:  - Squamous mucosa with mild glycogenic acanthosis  - No evidence of eosinophilic esophagitis  - Lichenoid features are not identified    EGD  Date: 5/8/18  Impression: A plastic stent was found on the posterior wall of the gastric body        consistent with previous transgastric drainage of WON. Stent removal was        accomplished with a rat-toothed forceps.        A previously placed plastic stent was seen in the area of the papilla.        Stent removal was accomplished with a rat-toothed forceps.                                                                                     Impression:          - Pre-existing gastric stent, removed.                        - Plastic stent in the duodenum. Removed.   Recommendation:      - Return to referring physician as previously scheduled.                                                                                     Pathology Report:    Colonoscopy:  Date:  Impression:  Pathology Report:     EndoFLIP directed at the UES or LES (8cm (EF-325) balloon length or 16cm (EF-322) balloon length):   Date:  8cm balloon  Balloon inflation Balloon pressure CSA (mm^2) DI (mm^2/mmHg) Dmin (mm) Compliance   20 (ladmark ID)        30        40        50           16cm balloon  Balloon inflation Balloon pressure CSA (mm^2) DI (mm^2/mmHg) Dmin (mm) Compliance   30 (ladmark ID)        40        50        60        70           High Resolution Manometry:  Date:  Impression:    PH/Impedance:  Date:  Impression:     Bravo:  48 or 96hr  Date:  Impression:    CT C/A/P:  Date: 4/6/23  Impression:  IMPRESSION:  1.  No evidence of recurrent or metastatic disease.  2.  A new small cluster of  small pulmonary nodules in the right middle lobe is most likely infectious or inflammatory. Recommend attention on follow-up imaging.    Modified barium Esophagram:  Date: 12/9/22  Impression:    On the AP view, stasis of the barium tablet was noted in the mid  esophagus, which passes to the distal esophagus with subsequent liquid  rinse. The tablet eventually passes to the stomach with additional  multiple liquid rinse. There was symmetric bolus transit. Delayed  transit of the pudding consistency barium noted in distal esophagus.  No appreciable focal luminal narrowing.     Impression:   1. No aspiration or penetration with any of the administered  consistencies.  2. Please see the speech pathologist report for additional details.    Prior medical records were reviewed including, but not limited to, notes from referring providers, lab work, radiographic tests, and other diagnostic tests. Pertinent results were summarized above.     History     Past Medical History:   Diagnosis Date    Arthritis Post chemo    Suspected    Diabetes (H)     History of blood transfusion     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 A few calcifications are noted along the lateral margin of the mass.    Pneumonia     2016    PONV (postoperative nausea and vomiting)        Past Surgical History:   Procedure Laterality Date    ABDOMEN SURGERY  1983    Ruptured tubal pregnancies, additional surgeries followed    BACK SURGERY  2004    L5, S1 discectomy    BIOPSY  2017, 2021    Pancreatic cyst, Lung    BREAST SURGERY  2003    Breast reduction    CHOLECYSTECTOMY  April 2018    Removed spleen and partial pancreas    COLONOSCOPY  2008    COLONOSCOPY N/A 12/11/2018    Procedure: colonoscopy;  Surgeon: Lobito Marte MD;  Location: [facility]  LOBECTOMY LUNG Left 07/28/2021    Procedure: Robot-assisted thoracoscopic left lower lobe wedge resection;  Surgeon: René Phillips MD;  Location: [facility] OR    ENDOSCOPIC RETROGRADE CHOLANGIOPANCREATOGRAM N/A 01/25/2018    Procedure: COMBINED ENDOSCOPIC RETROGRADE CHOLANGIOPANCREATOGRAPHY, PLACE TUBE/STENT;  Endoscopic retrograde cholangiopancreatogram with stent placement and cyst drainage bile ;  Surgeon: Vito Sanches MD;  Location: [facility] OR    ENDOSCOPIC ULTRASOUND LOWER GASTROINTESTIONAL TRACT (GI) N/A 01/25/2018    Procedure: ENDOSCOPIC ULTRASOUND LOWER GASTROINTESTIONAL TRACT (GI);  Endoscopic Ultrasound with guided Cyst-Gastrostomy;  Surgeon: González Metz MD;  Location: [facility] OR    ENDOSCOPIC ULTRASOUND, ESOPHAGOSCOPY, GASTROSCOPY, DUODENOSCOPY (EGD), NECROSECTOMY N/A 12/04/2017    Procedure: ENDOSCOPIC ULTRASOUND, ESOPHAGOSCOPY, GASTROSCOPY, DUODENOSCOPY (EGD), NECROSECTOMY;  Esophagogastroduodenoscopy, with  Necrosectomy and stents replacement  ;  Surgeon: González Metz MD;  Location: [facility] OR    ENDOSCOPIC ULTRASOUND, ESOPHAGOSCOPY, GASTROSCOPY, DUODENOSCOPY (EGD), NECROSECTOMY N/A 12/12/2017    Procedure: ENDOSCOPIC ULTRASOUND, ESOPHAGOSCOPY, GASTROSCOPY, DUODENOSCOPY (EGD), NECROSECTOMY;  Esophagogastroduodenoscopy with Necrosectomy, Balloon Dilation, stent removal X3 and Cystgastrostomy stent Placement X2;  Surgeon: Guru Cecilia Staples MD;  Location: [facility] OR    ESOPHAGOSCOPY, GASTROSCOPY, DUODENOSCOPY (EGD), COMBINED N/A 11/29/2017    Procedure: COMBINED ENDOSCOPIC ULTRASOUND, ESOPHAGOSCOPY, GASTROSCOPY, DUODENOSCOPY (EGD), FINE NEEDLE ASPIRATE/BIOPSY;  Endoscopic Ultrasound with cystgastrostomy and fine needle aspiration ;  Surgeon: González Metz MD;  Location: [facility] OR    ESOPHAGOSCOPY, GASTROSCOPY, DUODENOSCOPY (EGD), COMBINED N/A 05/08/2018    Procedure: COMBINED ESOPHAGOSCOPY, GASTROSCOPY, DUODENOSCOPY (EGD);  EGD;  Surgeon: González Metz MD;  Location:   GI    ESOPHAGOSCOPY, GASTROSCOPY, DUODENOSCOPY (EGD), COMBINED N/A 2018    Procedure: COMBINED ESOPHAGOSCOPY, GASTROSCOPY, DUODENOSCOPY (EGD), REMOVE FOREIGN BODY;;  Surgeon: González Metz MD;  Location: [facility] GI    ESOPHAGOSCOPY, GASTROSCOPY, DUODENOSCOPY (EGD), COMBINED N/A 2023    Procedure: ESOPHAGOGASTRODUODENOSCOPY, WITH BIOPSY;  Surgeon: Diaz Romeo MD;  Location: [facility] OR    GYN SURGERY  1983    Multiple ectopic pregnancies, reconnect,      INSERT PORT VASCULAR ACCESS Right 2018    Procedure: INSERT PORT VASCULAR ACCESS;  Chest Port Placement - right;  Surgeon: Chanda Lawson PA-C;  [facility]    IR PORT REMOVAL RIGHT  2019    LAPAROSCOPY DIAGNOSTIC (GENERAL) N/A 2018    Procedure: LAPAROSCOPY DIAGNOSTIC (GENERAL);  Diagnostic Laparoscopy; Open Distal Pancreatectomy And Splenectomy, splenic flexure mobilization, cholecystectomy, intraoperative ultrasound;  Surgeon: Ja Byrd MD;  Location: [facility] OR    MAMMOPLASTY REDUCTION Bilateral 2006    PANCREATECTOMY, SPLENECTOMY N/A 2018    Procedure: PANCREATECTOMY, SPLENECTOMY;;  Surgeon: Ja Byrd MD;  Location: [facility] OR    FL LAMNOTMY INCL W/DCMPRSN NRV ROOT 1 INTRSPC LUMBR      Description: Hemilaminectomy With Disc Removal One Lumbar Interspace;  Recorded: 2008;  Comments: Right L5-S1 with resultant loss of right patellar and achilles reflex    REMOVE PORT VASCULAR ACCESS Right 2019    Procedure: Right Port Removal;  Surgeon: Juan J Donaldson PA-C;  Location: [facility] OR    ZZC  DELIVERY ONLY      Description:  Section;  Recorded: 12/10/2009;       Social History     Socioeconomic History    Marital status:      Spouse name: Not on file    Number of children: Not on file    Years of education: Not on file    Highest education level: Not on file   Occupational History    Not on file   Tobacco Use    Smoking status: Former     Current packs/day: 0.00      Average packs/day: 0.5 packs/day for 6.0 years (3.0 ttl pk-yrs)     Types: Cigarettes     Start date: 1975     Quit date: 1981     Years since quittin.3     Passive exposure: Past    Smokeless tobacco: Never   Vaping Use    Vaping status: Never Used   Substance and Sexual Activity    Alcohol use: No     Alcohol/week: 21.0 standard drinks of alcohol     Comment: Now quit since Oct 31.  Moderate drinker in past    Drug use: No    Sexual activity: Not Currently     Partners: Male     Birth control/protection: None     Comment: No need   Other Topics Concern    Parent/sibling w/ CABG, MI or angioplasty before 65F 55M? Yes     Comment: Brother   Social History Narrative    .  Lives with .  Works as a .     1 son.      No family history of bleeding, clotting disorders or complications with anesthesia.     Social Determinants of Health     Financial Resource Strain: Low Risk  (2023)    Financial Resource Strain     Within the past 12 months, have you or your family members you live with been unable to get utilities (heat, electricity) when it was really needed?: No   Food Insecurity: Low Risk  (2023)    Food Insecurity     Within the past 12 months, did you worry that your food would run out before you got money to buy more?: No     Within the past 12 months, did the food you bought just not last and you didn t have money to get more?: No   Transportation Needs: Low Risk  (2023)    Transportation Needs     Within the past 12 months, has lack of transportation kept you from medical appointments, getting your medicines, non-medical meetings or appointments, work, or from getting things that you need?: No   Physical Activity: Sufficiently Active (2021)    Exercise Vital Sign     Days of Exercise per Week: 7 days     Minutes of Exercise per Session: 40 min   Stress: No Stress Concern Present (2021)    Citizen of the [place] of Occupational Health - Occupational Stress  Questionnaire     Feeling of Stress : Only a little   Social Connections: Unknown (2021)    Social Connection and Isolation Panel [NHANES]     Frequency of Communication with Friends and Family: Once a week     Frequency of Social Gatherings with Friends and Family: Once a week     Attends Hindu Services: Patient declined     Active Member of Clubs or Organizations: No     Attends Club or Organization Meetings: Not on file     Marital Status:    Interpersonal Safety: Low Risk  (2023)    Interpersonal Safety     Do you feel physically and emotionally safe where you currently live?: Yes     Within the past 12 months, have you been hit, slapped, kicked or otherwise physically hurt by someone?: Not on file     Within the past 12 months, have you been humiliated or emotionally abused in other ways by your partner or ex-partner?: No   Housing Stability: Low Risk  (2023)    Housing Stability     Do you have housing? : Yes     Are you worried about losing your housing?: No       Family History   Problem Relation Age of Onset    Heart Disease Father     Hyperlipidemia Father     Heart Disease Brother     Diabetes Mother     Hypertension Mother     Obesity Mother     Depression Mother     Diabetes Maternal Grandfather     Hypertension Maternal Grandfather     Obesity Maternal Grandfather     Diabetes Sister     Hypertension Sister     Depression Sister     Anxiety Disorder Sister     Obesity Sister     Hyperlipidemia Sister     Mental Illness Sister     Hypertension Brother     Hyperlipidemia Brother     Heart Disease Brother     Breast Cancer Cousin     Breast Cancer Cousin     Breast Cancer Cousin     Colon Cancer Other     Other Cancer Other         Mesothelioma    Depression Sister     Anxiety Disorder Sister     Obesity Sister     Anxiety Disorder Brother     Cancer Brother 64        colon cancer    Colon Cancer Brother         Metastasized Stage 4 -  2020    Anxiety Disorder Son      Depression Son     Mental Illness Sister         Schizophrenia    Hypertension Sister     Obesity Maternal Grandmother     Obesity Sister     Hypertension Sister     Diabetes Nephew     Depression Nephew     Mental Illness Nephew         Ptsd    Hypertension Brother     Diabetes Other     Other Cancer Cousin         Pancreatic    Depression Niece     Substance Abuse Sister         Alcohol    Substance Abuse Brother         Alcohol     Family history reviewed and edited as appropriate    Medications and Allergies:     Outpatient Encounter Medications as of 4/15/2024   Medication Sig Dispense Refill    acetaminophen (TYLENOL) 500 MG tablet Take 2 tablets (1,000 mg) by mouth every 8 hours 100 tablet 1    alcohol swab prep pads Use to swab area of injection/avel as directed. 100 each 3    blood glucose (NO BRAND SPECIFIED) test strip Use to test blood sugar 2 times daily or as directed. To accompany: Blood Glucose Monitor Brands: per insurance. 100 strip 6    blood glucose (ONETOUCH VERIO IQ) test strip Use to test blood sugar 4 times daily or as directed. 400 each 3    blood glucose calibration (NO BRAND SPECIFIED) solution To accompany: Blood Glucose Monitor Brands: per insurance. 1 each 3    blood glucose monitoring (NO BRAND SPECIFIED) meter device kit Use to test blood sugar 2 times daily or as directed. Preferred blood glucose meter OR supplies to accompany: Blood Glucose Monitor Brands: per insurance. 1 kit 0    clobetasol (TEMOVATE) 0.05 % GEL topical gel Apply topically 2 times daily 30 g 1    clobetasol (TEMOVATE) 0.05 % GEL topical gel Apply topically 2 times daily 15 g 1    gabapentin (NEURONTIN) 100 MG capsule Take 2 capsules (200 mg) by mouth 2 times daily 360 capsule 3    lipase-protease-amylase (CREON) 15439-06023-801051 units CPEP per EC capsule TAKE 4 CAPSULES WITH MEALS AND 2 CAPSULES WITH SNACKS, UP TO 16 CAPSULES PER DAY 1440 capsule 4    metFORMIN (GLUCOPHAGE XR) 500 MG 24 hr tablet Take 1  tablet (500 mg) by mouth 2 times daily (with meals) 180 tablet 3    OneTouch Delica Lancets 33G MISC 4 lancets daily 150 each 3    thin (NO BRAND SPECIFIED) lancets Use with lanceting device. To accompany: Blood Glucose Monitor Brands: per insurance. 100 each 6     Facility-Administered Encounter Medications as of 4/15/2024   Medication Dose Route Frequency Provider Last Rate Last Admin    heparin 100 UNIT/ML injection 5 mL  5 mL Intracatheter Once Art Guevara MD            No Known Allergies     Review of systems:  A full 10 point review of systems was obtained and was negative except for the pertinent positives and negatives stated within the HPI.    Objective Findings:   Physical Exam:    Constitutional: There were no vitals taken for this visit.  General: Alert, cooperative, no distress, well-appearing  Head: Atraumatic, normocephalic, no obvious abnormalities   Eyes: EOMI, Sclera anicteric, no obvious conjunctival hemorrhage   Nose: Nares normal, no obvious malformation, no obvious rhinorrhea   Respiratory: normal appearing respirations no cough  Musculoskeletal: Range of motion intact, no obvious strength deficit  Skin: No jaundice, no obvious rash  Neurologic: AAOx3, no obvious neurologic abnormality  Psychiatric: Normal Affect, appropriate mood  Extremities: No obvious edema, no obvious malformation     Labs, Radiology, Pathology     Lab Results   Component Value Date    WBC 8.7 12/11/2023    WBC 7.6 11/11/2022    WBC 10.5 08/06/2021    HGB 12.8 12/11/2023    HGB 13.1 11/11/2022    HGB 11.8 08/06/2021     12/11/2023     11/11/2022     (H) 08/06/2021    CHOL 186 12/11/2023    CHOL 165 11/11/2022    CHOL 171 11/09/2021    TRIG 78 12/11/2023    TRIG 78 11/11/2022    TRIG 93 11/09/2021    HDL 77 12/11/2023    HDL 70 11/11/2022    HDL 73 11/09/2021    ALT 13 12/11/2023    ALT 13 11/11/2022    ALT 30 08/06/2021    AST 25 12/11/2023    AST 24 11/11/2022    AST 16 08/06/2021    NA  140 12/11/2023     11/11/2022     08/06/2021    BUN 12.8 12/11/2023    BUN 17.7 11/11/2022    BUN 14 08/06/2021    CO2 28 12/11/2023    CO2 27 11/11/2022    CO2 28 08/06/2021    TSH 2.05 11/11/2022    TSH 1.48 04/30/2019    INR 1.01 07/08/2019    INR 1.24 (H) 04/17/2018    INR 0.99 04/02/2018        Liver Function Studies -   Recent Labs   Lab Test 11/11/22  1019   PROTTOTAL 7.2   ALBUMIN 4.3   BILITOTAL 0.5   ALKPHOS 76   AST 24   ALT 13          Patient Active Problem List    Diagnosis Date Noted    Esophageal dysphagia 06/06/2023     Priority: Medium    Leiomyoma of uterus, unspecified 07/26/2021     Priority: Medium     Formatting of this note might be different from the original.  Created by Conversion      s/p LLL wedge - 7/28 07/21/2021     Priority: Medium     Added automatically from request for surgery [id number]      Oral lichen planus 12/01/2019     Priority: Medium    Drug-induced polyneuropathy (H24) 10/14/2019     Priority: Medium    Type 2 diabetes mellitus without complication, without long-term current use of insulin (H) 10/05/2018     Priority: Medium     Graduated from [facility] 7/22/2019      Anemia 04/18/2018     Priority: Medium    Thrombocytopenia (H24) 04/18/2018     Priority: Medium    Asplenia after surgical procedure 04/18/2018     Priority: Medium    Pancreatic adenocarcinoma (H) 11/29/2017     Priority: Medium     TS completed 10.4 for TR 6.24 appt      Leukocytosis 11/01/2017     Priority: Medium    Fatty liver 11/01/2017     Priority: Medium     Seen on US 11/01/2017        Assessment and Plan   Assessment:    Kitty Bangura is a 63 year old female with oral lichen planus, cystic pancreatil tail adenocarcinoma, DM, trigeminal neuralgia, splenectomy who is presenting as a follow up patient in consultation at the request of Dr. Coughlin with a chief complaint of dysphagia.    The patient was seen in video telemedicine consultation regarding her symptoms of intermittent  dysphagia to both solids and liquids.      She was doing well except for a recent event that was prolonged for which she almost went to the ED. I have recommended HRM and 24hr pH impedance off therapy with the possibility of TBE in the future.     1. Esophageal dysphagia           Orders Placed This Encounter   Procedures    Adult GI  Referral - Procedure Only      Plan:  Please schedule a high resolution esophageal manometry and 24hr pH impedance off therapy    Follow up plan:   Return to clinic 6 months and as needed.    The risks and benefits of my recommendations, as well as other treatment options were discussed with the patient and any available family today. All questions were answered.     Follow up: As planned above. Today, I personally spent 18 minutes in direct face to face time with the patient, of which greater than 50% of the time was spent in patient education and counseling as described above. Approximately 10 minutes were spent on indirect care associated with the patient's consultation including but not limited to review of: patient medical records to date, clinic visits, hospital records, lab results, imaging studies, procedural documentation, and coordinating care with other providers. The findings from this review are summarized in the above note. All of the above accounted for a cumulative time of 28 minutes and was performed on the date of service.     The patient verbalized understanding of the plan and was appreciative for the time spent and information provided during the office visit.     Author:   Romero Winston DO   of Medicine  Director, Esophageal Disorders Program  Division of Gastroenterology, Hepatology, and Nutrition  [facility]    Dr. Winston speaks for [employer] regarding dupilumab and has participated in advisory boards for the medication. When discussing the medication, patients were informed of Dr. Winston's role and potential  conflict of interest.     Documentation assisted by voice recognition and documentation system.            Again, thank you for allowing me to participate in the care of your patient.      Sincerely,    Romero Winston, DO

## 2024-04-15 NOTE — PROGRESS NOTES
"Virtual Visit Details    Type of service:  Video Visit   Video Start Time:  130  Video End Time: 148 PM    Originating Location (pt. Location): Home    Distant Location (provider location):  On-site  Platform used for Video Visit: ReeceGlassUp    Reason for Visit:  chief complaint    Referred by: Ced  / Chris [facility] [id number]  Patient Care Team:  Kathy Jurado APRN CNP as PCP - Gagan Kent RN as Clinic Care Coordinator  Vito Sanches MD as MD (Gastroenterology)  Jovany Monk MD as MD (Oncology)  Es Gallo MD as MD (INTERNAL MEDICINE - ENDOCRINOLOGY, DIABETES & METABOLISM)  Jovany Monk MD as Assigned Cancer Care Provider  Oxana Knox RN as Registered Nurse (Nurse)  Kj Orellana RN as Specialty Care Coordinator (Hematology & Oncology)  Enrique Ward MD as MD (Otolaryngology)  Romero Winston DO as MD (Gastroenterology)  Bret Coughlin MD as MD (Otolaryngology)  Enrique aWrd MD as Assigned Surgical Provider  Romero Winston DO as Assigned Gastroenterology Provider  Romero Winston DO as Physician (Gastroenterology)  Kathy Jurado APRN CNP as Assigned PCP    History of Present Illness:   Kitty Bangura is a 63 year old female with oral lichen planus, 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 Coughlin with a chief complaint of dysphagia.  ---------------------------------------------------------------  4/15/24  Patient states that she had an incident a couple weeks ago where she had dysphagia. She states that a lot of times she \"doesn't feel great\" after eating. That night the patient took an NSAID prior to bed. She felt that food was not going down that evening. She had a pain associated in the low part of her sternum. This lasted over 20 minutes. Drinking water didn't help, sat up in bed. 20-25 " "minutes before feeling better.     Feels overly full and uncomfortable following meals. No associated nausea or vomiting. Has not yet undergone gastric emptying study.     Currently coughing.     See updated BEDQ and Eckardt score below: These patient reported outcomes are pertinent to the HPI and have personally been reviewed.    --------------------------------------  10/9/23  Symptoms are the same regarding swallowing. Dysphagia occurs 2-3 times a week with primarily hot or cold liquids. When solids sit int he mid chest she has pain associated. The patient is uncertain if she has acid reflux. Denies heartburn. She reports being on antireflux medication while hospitalized for pancreatic cancer surgery. She occasionally feels better when propped up and feels a gurgling when lying back. She has tried to avoid acidic foods.      See updated BEDQ and Eckardt score below: These patient reported outcomes are pertinent to the HPI and have personally been reviewed.    --------------------------------------------  6/6/23  Kitty Bangura states when she underwent swallow study and prior to that she has felt some issues feeling as if food \"hangs out\" in the esophagus and backs up. It occurs occasionally, not all the time. This has been ongoing for 2-3 years. She will have a pain when drinking liquids on occasion. This is not temperature dependent. It is sharp and doesn't last long. It gradually dissipates. If she reclines she can feel and hear her esophagus gurgle. This can happen once or a few times before it goes away. Chews very carefully and thoroughly. Symptoms of dysphagia occur with both solids and liquids. No known heartburn.     Oral lichen planus managed by avoiding acidic and spicy foods. If it flares, which is associated with stress, she will have an increase in oral ulcers and takes clobetasol swish and swallow.     See updated BEDQ and Eckardt score below: These patient reported outcomes are pertinent to the " HPI and have personally been reviewed.    ---------------------------------------------------------------       Wt Readings from Last 5 Encounters:   12/11/23 61.2 kg (135 lb)   10/09/23 60.8 kg (134 lb)   09/11/23 60.8 kg (134 lb)   08/25/23 60.8 kg (134 lb)   06/06/23 60.3 kg (133 lb)        Esophageal Questionnaire(s)    BEDQ Questionnaire      5/30/2023    12:42 PM 9/29/2023    11:31 AM 10/4/2023     1:55 PM 4/9/2024    11:41 AM   BEDQ Questionnaire: How Often Have You Had the Following?   Trouble eating solid food (meat, bread, vegetables) 0 0 1 0   Trouble eating soft foods (yogurt, jello, pudding) 0 0 0 0   Trouble swallowing liquids 0 2 2 1   Pain while swallowing 2 2 2 1   Coughing or choking while swallowing foods or liquids 0 0 1 1   Total Score: 2 4 6 3         5/30/2023    12:42 PM 9/29/2023    11:31 AM 10/4/2023     1:55 PM 4/9/2024    11:41 AM   BEDQ Questionnaire: Discomfort/Pain Ratings   Eating solid food (meat, bread, vegetables) 3 3 2 1   Eating soft foods (yogurt, jello, pudding) 2 1 0 1   Drinking liquid 3 4 4 3   Total Score: 8 8 6 5       Eckardt Questionnaire      5/30/2023    12:44 PM 9/29/2023    11:32 AM 4/9/2024    11:42 AM   Eckardt Questionnaire   Dysphagia 1 1 1   Regurgitation 1 1 1   Retrosternal Pain 1 1 1   Weight Loss (kg) 1 0 0   Total Score:  4 3 3       Promis 10 Questionnaire      5/30/2023    12:46 PM 9/29/2023    11:28 AM 4/9/2024    11:44 AM   PROMIS 10 FLOWSHEET DATA   In general, would you say your health is: 4 4 4   In general, would you say your quality of life is: 4 4 4   In general, how would you rate your physical health? 4 4 4   In general, how would you rate your mental health, including your mood and your ability to think? 4 3 3   In general, how would you rate your satisfaction with your social activities and relationships? 3 3 3   In general, please rate how well you carry out your usual social activities and roles. (This includes activities at home, at work  and in your community, and responsibilities as a parent, child, spouse, employee, friend, etc.) 3 3 3   To what extent are you able to carry out your everyday physical activities such as walking, climbing stairs, carrying groceries, or moving a chair? 5 5 5   In the past 7 days, how often have you been bothered by emotional problems such as feeling anxious, depressed, or irritable? 3 3 3   In the past 7 days, how would you rate your fatigue on average? 2 3 2   In the past 7 days, how would you rate your pain on average, where 0 means no pain, and 10 means worst imaginable pain? 2 6 5   Mental health question re-calculation - no clinical value 3 3    3 3   Physical health question re-calculation - no clinical value 4 3    3 4   Pain question re-calculation - no clinical value 4 3    3 3   Global Mental Health Score 14 13    13 13   Global Physical Health Score 17 15    15 16   PROMIS TOTAL - SUBSCORES 31 28    28 29     STUDIES & PROCEDURES:    EGD:   8/25/23   The examined esophagus was normal. Biopsies were taken with a cold        forceps for histology. Biopsies were taken with a cold forceps for        histology in the distal and proximal esophagus.        The entire examined stomach was normal.        The gastroesophageal flap valve was visualized endoscopically and        classified as Hill Grade III (minimal fold, loose to endoscope, hiatal        hernia likely).        The duodenal bulb and second portion of the duodenum were normal.                                                                                    Impression:            - Normal esophagus. Biopsied.                          - Normal stomach.                          - Gastroesophageal flap valve classified as Hill Grade                          III (minimal fold, loose to endoscope, hiatal hernia                          likely).                          - Normal duodenal bulb and second portion of the                          duodenum.    Recommendation:        - Await pathology results.   Pathology  A.  ESOPHAGUS, DISTAL, BIOPSY:  - Squamous mucosa with mild glycogenic acanthosis  - No evidence of eosinophilic esophagitis  - Lichenoid features are not identified     B.  ESOPHAGUS, PROXIMAL, BIOPSY:  - Squamous mucosa with mild glycogenic acanthosis  - No evidence of eosinophilic esophagitis  - Lichenoid features are not identified    EGD  Date: 5/8/18  Impression: A plastic stent was found on the posterior wall of the gastric body        consistent with previous transgastric drainage of WON. Stent removal was        accomplished with a rat-toothed forceps.        A previously placed plastic stent was seen in the area of the papilla.        Stent removal was accomplished with a rat-toothed forceps.                                                                                     Impression:          - Pre-existing gastric stent, removed.                        - Plastic stent in the duodenum. Removed.   Recommendation:      - Return to referring physician as previously scheduled.                                                                                     Pathology Report:    Colonoscopy:  Date:  Impression:  Pathology Report:     EndoFLIP directed at the UES or LES (8cm (EF-325) balloon length or 16cm (EF-322) balloon length):   Date:  8cm balloon  Balloon inflation Balloon pressure CSA (mm^2) DI (mm^2/mmHg) Dmin (mm) Compliance   20 (ladmark ID)        30        40        50           16cm balloon  Balloon inflation Balloon pressure CSA (mm^2) DI (mm^2/mmHg) Dmin (mm) Compliance   30 (ladmark ID)        40        50        60        70           High Resolution Manometry:  Date:  Impression:    PH/Impedance:  Date:  Impression:     Bravo:  48 or 96hr  Date:  Impression:    CT C/A/P:  Date: 4/6/23  Impression:  IMPRESSION:  1.  No evidence of recurrent or metastatic disease.  2.  A new small cluster of small pulmonary nodules in the right  middle lobe is most likely infectious or inflammatory. Recommend attention on follow-up imaging.    Modified barium Esophagram:  Date: 12/9/22  Impression:    On the AP view, stasis of the barium tablet was noted in the mid  esophagus, which passes to the distal esophagus with subsequent liquid  rinse. The tablet eventually passes to the stomach with additional  multiple liquid rinse. There was symmetric bolus transit. Delayed  transit of the pudding consistency barium noted in distal esophagus.  No appreciable focal luminal narrowing.     Impression:   1. No aspiration or penetration with any of the administered  consistencies.  2. Please see the speech pathologist report for additional details.    Prior medical records were reviewed including, but not limited to, notes from referring providers, lab work, radiographic tests, and other diagnostic tests. Pertinent results were summarized above.     History     Past Medical History:   Diagnosis Date    Arthritis Post chemo    Suspected    Diabetes (H)     History of blood transfusion     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 A few calcifications are noted along the lateral margin of the mass.    Pneumonia     2016    PONV (postoperative nausea and vomiting)        Past Surgical History:   Procedure Laterality Date    ABDOMEN SURGERY  1983    Ruptured tubal pregnancies, additional surgeries followed    BACK SURGERY  2004    L5, S1 discectomy    BIOPSY  2017, 2021    Pancreatic cyst, Lung    BREAST SURGERY  2003    Breast reduction    CHOLECYSTECTOMY  April 2018    Removed spleen and partial pancreas    COLONOSCOPY  2008    COLONOSCOPY N/A 12/11/2018    Procedure: colonoscopy;  Surgeon: Lobito Marte MD;  Location: [facility]    DAVINCI LOBECTOMY LUNG Left 07/28/2021     Procedure: Robot-assisted thoracoscopic left lower lobe wedge resection;  Surgeon: René Phillips MD;  Location: [facility]    ENDOSCOPIC RETROGRADE CHOLANGIOPANCREATOGRAM N/A 01/25/2018    Procedure: COMBINED ENDOSCOPIC RETROGRADE CHOLANGIOPANCREATOGRAPHY, PLACE TUBE/STENT;  Endoscopic retrograde cholangiopancreatogram with stent placement and cyst drainage bile ;  Surgeon: Vito Sanches MD;  Location: [facility]    ENDOSCOPIC ULTRASOUND LOWER GASTROINTESTIONAL TRACT (GI) N/A 01/25/2018    Procedure: ENDOSCOPIC ULTRASOUND LOWER GASTROINTESTIONAL TRACT (GI);  Endoscopic Ultrasound with guided Cyst-Gastrostomy;  Surgeon: González Metz MD;  Location: [facility]    ENDOSCOPIC ULTRASOUND, ESOPHAGOSCOPY, GASTROSCOPY, DUODENOSCOPY (EGD), NECROSECTOMY N/A 12/04/2017    Procedure: ENDOSCOPIC ULTRASOUND, ESOPHAGOSCOPY, GASTROSCOPY, DUODENOSCOPY (EGD), NECROSECTOMY;  Esophagogastroduodenoscopy, with  Necrosectomy and stents replacement  ;  Surgeon: González Metz MD;  Location: [facility]    ENDOSCOPIC ULTRASOUND, ESOPHAGOSCOPY, GASTROSCOPY, DUODENOSCOPY (EGD), NECROSECTOMY N/A 12/12/2017    Procedure: ENDOSCOPIC ULTRASOUND, ESOPHAGOSCOPY, GASTROSCOPY, DUODENOSCOPY (EGD), NECROSECTOMY;  Esophagogastroduodenoscopy with Necrosectomy, Balloon Dilation, stent removal X3 and Cystgastrostomy stent Placement X2;  Surgeon: Guru Cecilia Staples MD;  Location: [facility]    ESOPHAGOSCOPY, GASTROSCOPY, DUODENOSCOPY (EGD), COMBINED N/A 11/29/2017    Procedure: COMBINED ENDOSCOPIC ULTRASOUND, ESOPHAGOSCOPY, GASTROSCOPY, DUODENOSCOPY (EGD), FINE NEEDLE ASPIRATE/BIOPSY;  Endoscopic Ultrasound with cystgastrostomy and fine needle aspiration ;  Surgeon: González Metz MD;  [facility]    ESOPHAGOSCOPY, GASTROSCOPY, DUODENOSCOPY (EGD), COMBINED N/A 05/08/2018    Procedure: COMBINED ESOPHAGOSCOPY, GASTROSCOPY, DUODENOSCOPY (EGD);  EGD;  Surgeon: González Metz MD;  Location: [facility]    ESOPHAGOSCOPY, GASTROSCOPY,  DUODENOSCOPY (EGD), COMBINED N/A 2018    Procedure: COMBINED ESOPHAGOSCOPY, GASTROSCOPY, DUODENOSCOPY (EGD), REMOVE FOREIGN BODY;;  Surgeon: González Metz MD;  Location: [facility]    ESOPHAGOSCOPY, GASTROSCOPY, DUODENOSCOPY (EGD), COMBINED N/A 2023    Procedure: ESOPHAGOGASTRODUODENOSCOPY, WITH BIOPSY;  Surgeon: Diaz Romeo MD;  Location: [facility]    GYN SURGERY  1983    Multiple ectopic pregnancies, reconnect,      INSERT PORT VASCULAR ACCESS Right 2018    Procedure: INSERT PORT VASCULAR ACCESS;  Chest Port Placement - right;  Surgeon: Chanda Lawson PA-C;  [facility]    IR PORT REMOVAL RIGHT  2019    LAPAROSCOPY DIAGNOSTIC (GENERAL) N/A 2018    Procedure: LAPAROSCOPY DIAGNOSTIC (GENERAL);  Diagnostic Laparoscopy; Open Distal Pancreatectomy And Splenectomy, splenic flexure mobilization, cholecystectomy, intraoperative ultrasound;  Surgeon: Ja Byrd MD;  Location: [facility]    MAMMOPLASTY REDUCTION Bilateral 2006    PANCREATECTOMY, SPLENECTOMY N/A 2018    Procedure: PANCREATECTOMY, SPLENECTOMY;;  Surgeon: Ja Byrd MD;  Location: [facility]    ME LAMNOTMY INCL W/DCMPRSN NRV ROOT 1 INTRSPC LUMBR      Description: Hemilaminectomy With Disc Removal One Lumbar Interspace;  Recorded: 2008;  Comments: Right L5-S1 with resultant loss of right patellar and achilles reflex    REMOVE PORT VASCULAR ACCESS Right 2019    Procedure: Right Port Removal;  Surgeon: Juan J Donaldson PA-C;  Location: [facility]    ZZC  DELIVERY ONLY      Description:  Section;  Recorded: 12/10/2009;       Social History     Socioeconomic History    Marital status:      Spouse name: Not on file    Number of children: Not on file    Years of education: Not on file    Highest education level: Not on file   Occupational History    Not on file   Tobacco Use    Smoking status: Former     Current packs/day: 0.00     Average packs/day: 0.5 packs/day  for 6.0 years (3.0 ttl pk-yrs)     Types: Cigarettes     Start date: 1975     Quit date: 1981     Years since quittin.3     Passive exposure: Past    Smokeless tobacco: Never   Vaping Use    Vaping status: Never Used   Substance and Sexual Activity    Alcohol use: No     Alcohol/week: 21.0 standard drinks of alcohol     Comment: Now quit since Oct 31.  Moderate drinker in past    Drug use: No    Sexual activity: Not Currently     Partners: Male     Birth control/protection: None     Comment: No need   Other Topics Concern    Parent/sibling w/ CABG, MI or angioplasty before 65F 55M? Yes     Comment: Brother   Social History Narrative    .  Lives with .  Works as a .     1 son.      No family history of bleeding, clotting disorders or complications with anesthesia.     Social Determinants of Health     Financial Resource Strain: Low Risk  (2023)    Financial Resource Strain     Within the past 12 months, have you or your family members you live with been unable to get utilities (heat, electricity) when it was really needed?: No   Food Insecurity: Low Risk  (2023)    Food Insecurity     Within the past 12 months, did you worry that your food would run out before you got money to buy more?: No     Within the past 12 months, did the food you bought just not last and you didn t have money to get more?: No   Transportation Needs: Low Risk  (2023)    Transportation Needs     Within the past 12 months, has lack of transportation kept you from medical appointments, getting your medicines, non-medical meetings or appointments, work, or from getting things that you need?: No   Physical Activity: Sufficiently Active (2021)    Exercise Vital Sign     Days of Exercise per Week: 7 days     Minutes of Exercise per Session: 40 min   Stress: No Stress Concern Present (2021)    [employer] - Occupational Stress Questionnaire     Feeling of Stress  : Only a little   Social Connections: Unknown (2021)    Social Connection and Isolation Panel [NHANES]     Frequency of Communication with Friends and Family: Once a week     Frequency of Social Gatherings with Friends and Family: Once a week     Attends Zoroastrian Services: Patient declined     Active Member of Clubs or Organizations: No     Attends Club or Organization Meetings: Not on file     Marital Status:    Interpersonal Safety: Low Risk  (2023)    Interpersonal Safety     Do you feel physically and emotionally safe where you currently live?: Yes     Within the past 12 months, have you been hit, slapped, kicked or otherwise physically hurt by someone?: Not on file     Within the past 12 months, have you been humiliated or emotionally abused in other ways by your partner or ex-partner?: No   Housing Stability: Low Risk  (2023)    Housing Stability     Do you have housing? : Yes     Are you worried about losing your housing?: No       Family History   Problem Relation Age of Onset    Heart Disease Father     Hyperlipidemia Father     Heart Disease Brother     Diabetes Mother     Hypertension Mother     Obesity Mother     Depression Mother     Diabetes Maternal Grandfather     Hypertension Maternal Grandfather     Obesity Maternal Grandfather     Diabetes Sister     Hypertension Sister     Depression Sister     Anxiety Disorder Sister     Obesity Sister     Hyperlipidemia Sister     Mental Illness Sister     Hypertension Brother     Hyperlipidemia Brother     Heart Disease Brother     Breast Cancer Cousin     Breast Cancer Cousin     Breast Cancer Cousin     Colon Cancer Other     Other Cancer Other         Mesothelioma    Depression Sister     Anxiety Disorder Sister     Obesity Sister     Anxiety Disorder Brother     Cancer Brother 64        colon cancer    Colon Cancer Brother         Metastasized Stage 4 -  2020    Anxiety Disorder Son     Depression Son     Mental  Illness Sister         Schizophrenia    Hypertension Sister     Obesity Maternal Grandmother     Obesity Sister     Hypertension Sister     Diabetes Nephew     Depression Nephew     Mental Illness Nephew         Ptsd    Hypertension Brother     Diabetes Other     Other Cancer Cousin         Pancreatic    Depression Niece     Substance Abuse Sister         Alcohol    Substance Abuse Brother         Alcohol     Family history reviewed and edited as appropriate    Medications and Allergies:     Outpatient Encounter Medications as of 4/15/2024   Medication Sig Dispense Refill    acetaminophen (TYLENOL) 500 MG tablet Take 2 tablets (1,000 mg) by mouth every 8 hours 100 tablet 1    alcohol swab prep pads Use to swab area of injection/avel as directed. 100 each 3    blood glucose (NO BRAND SPECIFIED) test strip Use to test blood sugar 2 times daily or as directed. To accompany: Blood Glucose Monitor Brands: per insurance. 100 strip 6    blood glucose (ONETOUCH VERIO IQ) test strip Use to test blood sugar 4 times daily or as directed. 400 each 3    blood glucose calibration (NO BRAND SPECIFIED) solution To accompany: Blood Glucose Monitor Brands: per insurance. 1 each 3    blood glucose monitoring (NO BRAND SPECIFIED) meter device kit Use to test blood sugar 2 times daily or as directed. Preferred blood glucose meter OR supplies to accompany: Blood Glucose Monitor Brands: per insurance. 1 kit 0    clobetasol (TEMOVATE) 0.05 % GEL topical gel Apply topically 2 times daily 30 g 1    clobetasol (TEMOVATE) 0.05 % GEL topical gel Apply topically 2 times daily 15 g 1    gabapentin (NEURONTIN) 100 MG capsule Take 2 capsules (200 mg) by mouth 2 times daily 360 capsule 3    lipase-protease-amylase (CREON) 11207-04968-453683 units CPEP per EC capsule TAKE 4 CAPSULES WITH MEALS AND 2 CAPSULES WITH SNACKS, UP TO 16 CAPSULES PER DAY 1440 capsule 4    metFORMIN (GLUCOPHAGE XR) 500 MG 24 hr tablet Take 1 tablet (500 mg) by mouth 2 times  daily (with meals) 180 tablet 3    OneTouch Delica Lancets 33G MISC 4 lancets daily 150 each 3    thin (NO BRAND SPECIFIED) lancets Use with lanceting device. To accompany: Blood Glucose Monitor Brands: per insurance. 100 each 6     Facility-Administered Encounter Medications as of 4/15/2024   Medication Dose Route Frequency Provider Last Rate Last Admin    heparin 100 UNIT/ML injection 5 mL  5 mL Intracatheter Once Art Guevara MD            No Known Allergies     Review of systems:  A full 10 point review of systems was obtained and was negative except for the pertinent positives and negatives stated within the HPI.    Objective Findings:   Physical Exam:    Constitutional: There were no vitals taken for this visit.  General: Alert, cooperative, no distress, well-appearing  Head: Atraumatic, normocephalic, no obvious abnormalities   Eyes: EOMI, Sclera anicteric, no obvious conjunctival hemorrhage   Nose: Nares normal, no obvious malformation, no obvious rhinorrhea   Respiratory: normal appearing respirations no cough  Musculoskeletal: Range of motion intact, no obvious strength deficit  Skin: No jaundice, no obvious rash  Neurologic: AAOx3, no obvious neurologic abnormality  Psychiatric: Normal Affect, appropriate mood  Extremities: No obvious edema, no obvious malformation     Labs, Radiology, Pathology     Lab Results   Component Value Date    WBC 8.7 12/11/2023    WBC 7.6 11/11/2022    WBC 10.5 08/06/2021    HGB 12.8 12/11/2023    HGB 13.1 11/11/2022    HGB 11.8 08/06/2021     12/11/2023     11/11/2022     (H) 08/06/2021    CHOL 186 12/11/2023    CHOL 165 11/11/2022    CHOL 171 11/09/2021    TRIG 78 12/11/2023    TRIG 78 11/11/2022    TRIG 93 11/09/2021    HDL 77 12/11/2023    HDL 70 11/11/2022    HDL 73 11/09/2021    ALT 13 12/11/2023    ALT 13 11/11/2022    ALT 30 08/06/2021    AST 25 12/11/2023    AST 24 11/11/2022    AST 16 08/06/2021     12/11/2023     11/11/2022      08/06/2021    BUN 12.8 12/11/2023    BUN 17.7 11/11/2022    BUN 14 08/06/2021    CO2 28 12/11/2023    CO2 27 11/11/2022    CO2 28 08/06/2021    TSH 2.05 11/11/2022    TSH 1.48 04/30/2019    INR 1.01 07/08/2019    INR 1.24 (H) 04/17/2018    INR 0.99 04/02/2018        Liver Function Studies -   Recent Labs   Lab Test 11/11/22  1019   PROTTOTAL 7.2   ALBUMIN 4.3   BILITOTAL 0.5   ALKPHOS 76   AST 24   ALT 13          Patient Active Problem List    Diagnosis Date Noted    Esophageal dysphagia 06/06/2023     Priority: Medium    Leiomyoma of uterus, unspecified 07/26/2021     Priority: Medium     Formatting of this note might be different from the original.  Created by Conversion      s/p LLL wedge - 7/28 07/21/2021     Priority: Medium     Added automatically from request for surgery [id number]      Oral lichen planus 12/01/2019     Priority: Medium    Drug-induced polyneuropathy (H24) 10/14/2019     Priority: Medium    Type 2 diabetes mellitus without complication, without long-term current use of insulin (H) 10/05/2018     Priority: Medium     Graduated from Endocrine Clinic 7/22/2019      Anemia 04/18/2018     Priority: Medium    Thrombocytopenia (H24) 04/18/2018     Priority: Medium    Asplenia after surgical procedure 04/18/2018     Priority: Medium    Pancreatic adenocarcinoma (H) 11/29/2017     Priority: Medium     TS completed 10.4 for TR 6.24 appt      Leukocytosis 11/01/2017     Priority: Medium    Fatty liver 11/01/2017     Priority: Medium     Seen on US 11/01/2017        Assessment and Plan   Assessment:    Kitty Bangura is a 63 year old female with oral lichen planus, cystic pancreatil tail adenocarcinoma, DM, trigeminal neuralgia, splenectomy who is presenting as a follow up patient in consultation at the request of Dr. Coughlin with a chief complaint of dysphagia.    The patient was seen in video telemedicine consultation regarding her symptoms of intermittent dysphagia to both solids and liquids.       She was doing well except for a recent event that was prolonged for which she almost went to the ED. I have recommended HRM and 24hr pH impedance off therapy with the possibility of TBE in the future.     1. Esophageal dysphagia           Orders Placed This Encounter   Procedures    Adult GI  Referral - Procedure Only      Plan:  Please schedule a high resolution esophageal manometry and 24hr pH impedance off therapy    Follow up plan:   Return to clinic 6 months and as needed.    The risks and benefits of my recommendations, as well as other treatment options were discussed with the patient and any available family today. All questions were answered.     Follow up: As planned above. Today, I personally spent 18 minutes in direct face to face time with the patient, of which greater than 50% of the time was spent in patient education and counseling as described above. Approximately 10 minutes were spent on indirect care associated with the patient's consultation including but not limited to review of: patient medical records to date, clinic visits, hospital records, lab results, imaging studies, procedural documentation, and coordinating care with other providers. The findings from this review are summarized in the above note. All of the above accounted for a cumulative time of 28 minutes and was performed on the date of service.     The patient verbalized understanding of the plan and was appreciative for the time spent and information provided during the office visit.     Author:   Romero Winston DO   of Medicine  Director, Esophageal Disorders Program  Division of Gastroenterology, Hepatology, and Nutrition  [facility]    Dr. Winston speaks for [employer] regarding dupilumab and has participated in advisory boards for the medication. When discussing the medication, patients were informed of Dr. Winston's role and potential conflict of interest.     Documentation  assisted by voice recognition and documentation system.

## 2024-04-15 NOTE — PATIENT INSTRUCTIONS
It was a pleasure taking care of you today.  I've included a brief summary of our discussion and care plan from today's visit below.  Please review this information with your primary care provider.  _______________________________________________________________________    My recommendations are summarized as follows:    Please schedule a high resolution esophageal manometry and 24hr pH impedance off therapy    To schedule endoscopic procedures you may call: [phone]  To schedule radiology tests you may call: [phone]  To schedule an ENT appointment you may call: [phone]    Please call my nurse care coordinator Chano ([phone]) or Amy ([phone]), with any questions or concerns.  If you were seen through the [facility] please feel free to reach out to Vanessa at [phone]   --    Return to GI Clinic in 6 months to review your progress.    _______________________________________________________________________    Who do I call with any questions after my visit?  Please be in touch if there are any further questions that arise following today's visit.  There are multiple ways to contact your gastroenterology care team.      During business hours, you may reach a Gastroenterology nurse at [phone] and choose option 3.       To schedule or reschedule an appointment, please call [phone].     You can always send a secure message through [employer].  [employer] messages are answered by your nurse or doctor typically within 24 hours.  Please allow extra time on weekends and holidays.      For urgent/emergent questions after business hours, you may reach the on-call GI Fellow by contacting the [facility] at [phone].     How will I get the results of any tests ordered?    You will receive all of your results.  If you have signed up for MyChart, any tests ordered at your visit will be available to you after your physician reviews them.  Typically this takes 1-2 weeks.   If there are urgent results that require a change in your care plan, your physician or nurse will call you to discuss the next steps.      What is [employer]?  [employer] is a secure way for you to access all of your healthcare records from the [facility].  It is a web based computer program, so you can sign on to it from any location.  It also allows you to send secure messages to your care team.  I recommend signing up for [employer] access if you have not already done so and are comfortable with using a computer.      How to I schedule a follow-up visit?  If you did not schedule a follow-up visit today, please call [phone] to schedule a follow-up office visit.      If you feel you received exceptional care and are interested in supporting the clinical and research goals of Dr. Winston or the Division of Gastroenterology, Hepatology, and Nutrition please contact him directly through [employer]  to discuss opportunities to donate.    Sincerely,    Romero Winston DO   of Medicine  Director, Esophageal Disorders Program  Division of Gastroenterology, Hepatology, and Nutrition  [facility]

## 2024-04-15 NOTE — NURSING NOTE
Is the patient currently in the state of [place]? YES    Visit mode:VIDEO    If the visit is dropped, the patient can be reconnected by: VIDEO VISIT: Send to e-mail at: [email or web address]    Will anyone else be joining the visit? NO  (If patient encounters technical issues they should call [phone]    How would you like to obtain your AVS? MyChart    Are changes needed to the allergy or medication list? No    Are refills needed on medications prescribed by this physician? NO    Reason for visit: RECHECK    Angela MONTE

## 2024-04-19 ENCOUNTER — TELEPHONE (OUTPATIENT)
Dept: GASTROENTEROLOGY | Facility: CLINIC | Age: 64
End: 2024-04-19
Payer: COMMERCIAL

## 2024-05-15 ENCOUNTER — PATIENT OUTREACH (OUTPATIENT)
Dept: GASTROENTEROLOGY | Facility: CLINIC | Age: 64
End: 2024-05-15
Payer: COMMERCIAL

## 2024-05-15 NOTE — TELEPHONE ENCOUNTER
Reached out to pt to review information and instructions for upcoming testing. Full understanding verbalized by pt. No further questions or concerns prior to testing.       Service: Esophageal Workup   Esophageal Workup: Manometry w/pH   Pre-Procedure Recommendations: Off Therapy (hold PPI/H2 blocker for a minimum of 7 days pre-procedure)   Reason for Esophageal Workup: dysphagia and associated pain, possible reflux

## 2024-05-23 ENCOUNTER — OFFICE VISIT (OUTPATIENT)
Dept: GASTROENTEROLOGY | Facility: CLINIC | Age: 64
End: 2024-05-23
Payer: COMMERCIAL

## 2024-05-23 VITALS
HEART RATE: 68 BPM | WEIGHT: 134 LBS | RESPIRATION RATE: 16 BRPM | OXYGEN SATURATION: 98 % | SYSTOLIC BLOOD PRESSURE: 102 MMHG | DIASTOLIC BLOOD PRESSURE: 68 MMHG | TEMPERATURE: 98.4 F | BODY MASS INDEX: 21.53 KG/M2 | HEIGHT: 66 IN

## 2024-05-23 DIAGNOSIS — R13.19 ESOPHAGEAL DYSPHAGIA: ICD-10-CM

## 2024-05-23 PROCEDURE — 91038 ESOPH IMPED FUNCT TEST > 1HR: CPT | Performed by: INTERNAL MEDICINE

## 2024-05-23 PROCEDURE — 91010 ESOPHAGUS MOTILITY STUDY: CPT | Performed by: INTERNAL MEDICINE

## 2024-05-23 ASSESSMENT — PAIN SCALES - GENERAL: PAINLEVEL: MILD PAIN (2)

## 2024-05-23 NOTE — PATIENT INSTRUCTIONS
Esophogeal Manometry with pH  1. Resume regular diet.  2. You may have a bloody nose or sore throat after the procedure.  3. You had a 24-hour probe placed, return the next day to have the probe removed.  Removal will take 5 minutes.  4. If you have questions call [phone] from 7:00am-5:00pm.  For afterhours questions call GI doctor on call at [phone].

## 2024-05-23 NOTE — PROGRESS NOTES
Non-Invasive GI Procedure Visit    Kitty Bangura is a 63 year old female with history of Esophageal dysphagia.   Patient stated reason for procedure: Dysphagia      COVID-19 PCR Results           No data to display              COVID-19 Antibody Results, Testing for Immunity           No data to display                Pre-Procedure Assessment  Patient presents to clinic today for Esophageal Manometry Study with pH    Referring Provider: Dr Winston  Patient has undergone previous endoscopy.    Does patient report taking a PPI (omeprazole, pantoprazole, rabeprazole, lansoprazole, esomeprazole, dexlansoprazole)? No  Does patient report taking a H2 blocker (ranitidine, or famotidine)? No  Does patient report taking opioids? No  Patient reported that last food and/or drink was last consumed 10 hours ago.  Esophageal Questionnaire(s) Completed: Yes -   Esophageal Questionnaire(s)    BEDQ Questionnaire      5/30/2023    12:42 PM 9/29/2023    11:31 AM 10/4/2023     1:55 PM 4/9/2024    11:41 AM 5/20/2024     1:57 PM   BEDQ Questionnaire: How Often Have You Had the Following?   Trouble eating solid food (meat, bread, vegetables) 0 0 1 0 0   Trouble eating soft foods (yogurt, jello, pudding) 0 0 0 0 0   Trouble swallowing liquids 0 2 2 1 0   Pain while swallowing 2 2 2 1 2   Coughing or choking while swallowing foods or liquids 0 0 1 1 0   Total Score: 2 4 6 3 2         5/30/2023    12:42 PM 9/29/2023    11:31 AM 10/4/2023     1:55 PM 4/9/2024    11:41 AM 5/20/2024     1:57 PM   BEDQ Questionnaire: Discomfort/Pain Ratings   Eating solid food (meat, bread, vegetables) 3 3 2 1 2   Eating soft foods (yogurt, jello, pudding) 2 1 0 1 2   Drinking liquid 3 4 4 3 2   Total Score: 8 8 6 5 6       Eckardt Questionnaire      5/30/2023    12:44 PM 9/29/2023    11:32 AM 4/9/2024    11:42 AM 5/20/2024     1:58 PM   Eckardt Questionnaire   Dysphagia 1 1 1 1   Regurgitation 1 1 1 1   Retrosternal Pain 1 1 1 1   Weight Loss (kg) 1 0 0 0  "  Total Score:  4 3 3 3       Promis 10 Questionnaire      5/30/2023    12:46 PM 9/29/2023    11:28 AM 4/9/2024    11:44 AM 5/20/2024     2:00 PM   PROMIS 10 FLOWSHEET DATA   In general, would you say your health is: 4 4 4 4   In general, would you say your quality of life is: 4 4 4 4   In general, how would you rate your physical health? 4 4 4 4   In general, how would you rate your mental health, including your mood and your ability to think? 4 3 3 3   In general, how would you rate your satisfaction with your social activities and relationships? 3 3 3 3   In general, please rate how well you carry out your usual social activities and roles. (This includes activities at home, at work and in your community, and responsibilities as a parent, child, spouse, employee, friend, etc.) 3 3 3 3   To what extent are you able to carry out your everyday physical activities such as walking, climbing stairs, carrying groceries, or moving a chair? 5 5 5 5   In the past 7 days, how often have you been bothered by emotional problems such as feeling anxious, depressed, or irritable? 3 3 3 4   In the past 7 days, how would you rate your fatigue on average? 2 3 2 3   In the past 7 days, how would you rate your pain on average, where 0 means no pain, and 10 means worst imaginable pain? 2 6 5 5   Mental health question re-calculation - no clinical value 3 3    3 3 2   Physical health question re-calculation - no clinical value 4 3    3 4 3   Pain question re-calculation - no clinical value 4 3    3 3 3   Global Mental Health Score 14 13    13 13 12   Global Physical Health Score 17 15    15 16 15   PROMIS TOTAL - SUBSCORES 31 28    28 29 27   .    Patient Hx  Patient's history, medications and allergies were reviewed.     Height: 5' 5.5\"   Weight: 134 lbs 0 oz    Patient Active Problem List    Diagnosis Date Noted    Esophageal dysphagia 06/06/2023     Priority: Medium    Leiomyoma of uterus, unspecified 07/26/2021     Priority: Medium "     Formatting of this note might be different from the original.  Created by Conversion      s/p LLL wedge - 7/28 07/21/2021     Priority: Medium     Added automatically from request for surgery [id number]      Oral lichen planus 12/01/2019     Priority: Medium    Drug-induced polyneuropathy (H24) 10/14/2019     Priority: Medium    Type 2 diabetes mellitus without complication, without long-term current use of insulin (H) 10/05/2018     Priority: Medium     Graduated from Endocrine Clinic 7/22/2019      Anemia 04/18/2018     Priority: Medium    Thrombocytopenia (H24) 04/18/2018     Priority: Medium    Asplenia after surgical procedure 04/18/2018     Priority: Medium    Pancreatic adenocarcinoma (H) 11/29/2017     Priority: Medium     TS completed 10.4 for TR 6.24 appt      Leukocytosis 11/01/2017     Priority: Medium    Fatty liver 11/01/2017     Priority: Medium     Seen on US 11/01/2017        Prior to Admission medications    Medication Sig Start Date End Date Taking? Authorizing Provider   acetaminophen (TYLENOL) 500 MG tablet Take 2 tablets (1,000 mg) by mouth every 8 hours 4/22/18   Jani Cisneros MD   alcohol swab prep pads Use to swab area of injection/avel as directed. 12/2/22   Maribeth Brown APRN CNP   blood glucose (NO BRAND SPECIFIED) test strip Use to test blood sugar 2 times daily or as directed. To accompany: Blood Glucose Monitor Brands: per insurance. 1/18/24   Stephanie Kelley PA-C   blood glucose (ONETOUCH VERIO IQ) test strip Use to test blood sugar 4 times daily or as directed. 8/24/19   Es Gallo MD   blood glucose calibration (NO BRAND SPECIFIED) solution To accompany: Blood Glucose Monitor Brands: per insurance. 12/2/22   Maribeth Brown APRN CNP   blood glucose monitoring (NO BRAND SPECIFIED) meter device kit Use to test blood sugar 2 times daily or as directed. Preferred blood glucose meter OR supplies to accompany: Blood Glucose Monitor Brands: per  insurance. 12/2/22   Maribeth Brown APRN CNP   clobetasol (TEMOVATE) 0.05 % GEL topical gel Apply topically 2 times daily 12/8/22   Enrique Ward MD   clobetasol (TEMOVATE) 0.05 % GEL topical gel Apply topically 2 times daily 12/1/20   Enrique Ward MD   gabapentin (NEURONTIN) 100 MG capsule Take 2 capsules (200 mg) by mouth 2 times daily 12/11/23   Kathy Jurado APRN CNP   lipase-protease-amylase (CREON) 26348-99222-093296 units CPEP per EC capsule TAKE 4 CAPSULES WITH MEALS AND 2 CAPSULES WITH SNACKS, UP TO 16 CAPSULES PER DAY 1/15/24   Jovany Monk MD   metFORMIN (GLUCOPHAGE XR) 500 MG 24 hr tablet Take 1 tablet (500 mg) by mouth 2 times daily (with meals) 12/11/23   Kathy Jurado APRN CNP   OneTouch Delica Lancets 33G MISC 4 lancets daily 12/8/19   Es Gallo MD   thin (NO BRAND SPECIFIED) lancets Use with lanceting device. To accompany: Blood Glucose Monitor Brands: per insurance. 12/2/22   Maribeth Borwn APRN CNP     No Known Allergies  Past Medical History:   Diagnosis Date    Arthritis Post chemo    Suspected    Diabetes (H)     History of blood transfusion     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 A few calcifications are noted along the lateral margin of the mass.    Pneumonia     2016    PONV (postoperative nausea and vomiting)      Past Surgical History:   Procedure Laterality Date    ABDOMEN SURGERY  1983    Ruptured tubal pregnancies, additional surgeries followed    BACK SURGERY  2004    L5, S1 discectomy    BIOPSY  2017, 2021    Pancreatic cyst, Lung    BREAST SURGERY  2003    Breast reduction    CHOLECYSTECTOMY  April 2018    Removed spleen and partial pancreas    COLONOSCOPY  2008    COLONOSCOPY N/A 12/11/2018    Procedure: colonoscopy;  Surgeon:  Lobito Marte MD;  Location: [facility]    DAVINCI LOBECTOMY LUNG Left 07/28/2021    Procedure: Robot-assisted thoracoscopic left lower lobe wedge resection;  Surgeon: René Phillips MD;  Location: [facility] OR    ENDOSCOPIC RETROGRADE CHOLANGIOPANCREATOGRAM N/A 01/25/2018    Procedure: COMBINED ENDOSCOPIC RETROGRADE CHOLANGIOPANCREATOGRAPHY, PLACE TUBE/STENT;  Endoscopic retrograde cholangiopancreatogram with stent placement and cyst drainage bile ;  Surgeon: Vito Sanches MD;  Location: [facility] OR    ENDOSCOPIC ULTRASOUND LOWER GASTROINTESTIONAL TRACT (GI) N/A 01/25/2018    Procedure: ENDOSCOPIC ULTRASOUND LOWER GASTROINTESTIONAL TRACT (GI);  Endoscopic Ultrasound with guided Cyst-Gastrostomy;  Surgeon: González Metz MD;  Location: [facility] OR    ENDOSCOPIC ULTRASOUND, ESOPHAGOSCOPY, GASTROSCOPY, DUODENOSCOPY (EGD), NECROSECTOMY N/A 12/04/2017    Procedure: ENDOSCOPIC ULTRASOUND, ESOPHAGOSCOPY, GASTROSCOPY, DUODENOSCOPY (EGD), NECROSECTOMY;  Esophagogastroduodenoscopy, with  Necrosectomy and stents replacement  ;  Surgeon: González Metz MD;  Location: [facility] OR    ENDOSCOPIC ULTRASOUND, ESOPHAGOSCOPY, GASTROSCOPY, DUODENOSCOPY (EGD), NECROSECTOMY N/A 12/12/2017    Procedure: ENDOSCOPIC ULTRASOUND, ESOPHAGOSCOPY, GASTROSCOPY, DUODENOSCOPY (EGD), NECROSECTOMY;  Esophagogastroduodenoscopy with Necrosectomy, Balloon Dilation, stent removal X3 and Cystgastrostomy stent Placement X2;  Surgeon: Guru Cecilia Staples MD;  Location: [facility] OR    ESOPHAGOSCOPY, GASTROSCOPY, DUODENOSCOPY (EGD), COMBINED N/A 11/29/2017    Procedure: COMBINED ENDOSCOPIC ULTRASOUND, ESOPHAGOSCOPY, GASTROSCOPY, DUODENOSCOPY (EGD), FINE NEEDLE ASPIRATE/BIOPSY;  Endoscopic Ultrasound with cystgastrostomy and fine needle aspiration ;  Surgeon: González Metz MD;  Location: [facility] OR    ESOPHAGOSCOPY, GASTROSCOPY, DUODENOSCOPY (EGD), COMBINED N/A 05/08/2018    Procedure: COMBINED ESOPHAGOSCOPY, GASTROSCOPY, DUODENOSCOPY (EGD);  EGD;   Surgeon: González Metz MD;  Location:  GI    ESOPHAGOSCOPY, GASTROSCOPY, DUODENOSCOPY (EGD), COMBINED N/A 2018    Procedure: COMBINED ESOPHAGOSCOPY, GASTROSCOPY, DUODENOSCOPY (EGD), REMOVE FOREIGN BODY;;  Surgeon: González Metz MD;  Location:  GI    ESOPHAGOSCOPY, GASTROSCOPY, DUODENOSCOPY (EGD), COMBINED N/A 2023    Procedure: ESOPHAGOGASTRODUODENOSCOPY, WITH BIOPSY;  Surgeon: Diaz Romeo MD;  Location: [facility] OR    GYN SURGERY  1983    Multiple ectopic pregnancies, reconnect,      INSERT PORT VASCULAR ACCESS Right 2018    Procedure: INSERT PORT VASCULAR ACCESS;  Chest Port Placement - right;  Surgeon: Chanda Lawson PA-C;  Location:  OR    IR PORT REMOVAL RIGHT  2019    LAPAROSCOPY DIAGNOSTIC (GENERAL) N/A 2018    Procedure: LAPAROSCOPY DIAGNOSTIC (GENERAL);  Diagnostic Laparoscopy; Open Distal Pancreatectomy And Splenectomy, splenic flexure mobilization, cholecystectomy, intraoperative ultrasound;  Surgeon: Ja Byrd MD;  Location: [facility] OR    MAMMOPLASTY REDUCTION Bilateral 2006    PANCREATECTOMY, SPLENECTOMY N/A 2018    Procedure: PANCREATECTOMY, SPLENECTOMY;;  Surgeon: Ja Byrd MD;  Location: [facility] OR    NH LAMNOTMY INCL W/DCMPRSN NRV ROOT 1 INTRSPC LUMBR      Description: Hemilaminectomy With Disc Removal One Lumbar Interspace;  Recorded: 2008;  Comments: Right L5-S1 with resultant loss of right patellar and achilles reflex    REMOVE PORT VASCULAR ACCESS Right 2019    Procedure: Right Port Removal;  Surgeon: Juan J Donaldson PA-C;  Location:  OR    [facility]  DELIVERY ONLY      Description:  Section;  Recorded: 12/10/2009;     Family History   Problem Relation Age of Onset    Heart Disease Father     Hyperlipidemia Father     Heart Disease Brother     Diabetes Mother     Hypertension Mother     Obesity Mother     Depression Mother     Diabetes Maternal Grandfather     Hypertension Maternal  Grandfather     Obesity Maternal Grandfather     Diabetes Sister     Hypertension Sister     Depression Sister     Anxiety Disorder Sister     Obesity Sister     Hyperlipidemia Sister     Mental Illness Sister     Hypertension Brother     Hyperlipidemia Brother     Heart Disease Brother     Breast Cancer Cousin     Breast Cancer Cousin     Breast Cancer Cousin     Colon Cancer Other     Other Cancer Other         Mesothelioma    Depression Sister     Anxiety Disorder Sister     Obesity Sister     Anxiety Disorder Brother     Cancer Brother 64        colon cancer    Colon Cancer Brother         Metastasized Stage 4 -  2020    Anxiety Disorder Son     Depression Son     Mental Illness Sister         Schizophrenia    Hypertension Sister     Obesity Maternal Grandmother     Obesity Sister     Hypertension Sister     Diabetes Nephew     Depression Nephew     Mental Illness Nephew         Ptsd    Hypertension Brother     Diabetes Other     Other Cancer Cousin         Pancreatic    Depression Niece     Substance Abuse Sister         Alcohol    Substance Abuse Brother         Alcohol     Social History     Tobacco Use    Smoking status: Former     Current packs/day: 0.00     Average packs/day: 0.5 packs/day for 6.0 years (3.0 ttl pk-yrs)     Types: Cigarettes     Start date: 1975     Quit date: 1981     Years since quittin.4     Passive exposure: Past    Smokeless tobacco: Never   Substance Use Topics    Alcohol use: No     Alcohol/week: 21.0 standard drinks of alcohol     Comment: Now quit since Oct 31.  Moderate drinker in past        Pre-Procedure Education & Consent  Procedure education was provided to: Patient  Teaching method: Explanation  Barriers to learning: No Barrier    Patient indicated understanding of pre-procedure instruction and appropriate consent was obtained and documented.    ____________________________________________________________________    Post-Procedure Documentation:  GI Esophageal Manometry with 24 Hour Ph    Manometry catheter was placed via right nare to 52 cm and normal saline swallows given per protocol. Manometry catheter was removed at the end of test and the pH cathter was placed via right nare to 35 cm.      Diary and discharge instructions given to patient and patent instructed to return tomorrow for catheter removal.    Notification of pending test results sent to provider for interpretation. Please reference scanned document for final interpretation of results. Patient will follow up with referring provider for test results.    Amy Crain RN on 5/23/2024 at 8:43 AM

## 2024-05-24 ENCOUNTER — ALLIED HEALTH/NURSE VISIT (OUTPATIENT)
Dept: GASTROENTEROLOGY | Facility: CLINIC | Age: 64
End: 2024-05-24
Payer: COMMERCIAL

## 2024-05-24 DIAGNOSIS — R11.2 NAUSEA WITH VOMITING: Primary | ICD-10-CM

## 2024-05-24 PROCEDURE — 99207 PR NO BILLABLE SERVICE THIS VISIT: CPT

## 2024-05-24 NOTE — NURSING NOTE
24hr pH probe removed without issue.  Monitor and diary retrieved for data upload.  Sent to reading provider: Dr Catrachito Shaw

## 2024-05-30 NOTE — PROGRESS NOTES
CANCER SURVIVORSHIP VISIT  Jun 13, 2024   Care Team:    Care Team   Primary Care Provider Kathy Jurado   Surgeon  Ja Byrd MD Bhargava, Amit, MD    Medical Oncologist  Jovany Monk MD       REASON FOR VISIT: survivorship visit for history of pancreatic cancer    CANCER HISTORY AND TREATMENT:    History of stage IB pancreatic cancer (tail) diagnosed in 2017  *Diagnosis:  11/29/2017. Presented with acute pancreatitis. 57 years old. Clinical stage IB (T2, N0, MX-small pulmonary nodules).   *Neoadjuvant chemotherapy: 4 cycles of FOLFIRINOX. She had acute pancreatitis after cycle one and had a cyst gastrostomy. Also had neutropenia and chemo was delayed a week and neulasta was added on to cycle 2. Cycle 3 was dose reduced due to neuropathy.   *Surgery: 04/17/2018 Laparoscopic distal pancreatectomy, omental resection, cholecystectomy, and splenectomy.  Complete pathologic response.   *Adjuvant Chemotherapy: 4 cycles of gemcitabine/capecitabine. Cycle 3 was dose reduced due to mucositis.   *Genetic testing: none  *Other information: 07/28/2021 wedge resection for new left lower lung nodule- carcinoid tumor.     INTERVAL HISTORY:   Today is Kitty's first appointment with survivorship. She was referred by her oncologist- Dr. Jovany Monk. She has been staying very active and walks 2-4 miles/day. She endorses eating a lean diet and avoids fatty foods.    Her primary concern today is mood and coping after cancer. She reports that cancer has strained her relationships with her close friends. Additionally she endorses increased irritability, anxiety a majority of the day of the week, and depressed mood. No SI. Briefly discussed stages of grief and validated many conflicting emotions in survivorship. Encouraged pt to make an appointment w/ Dr. Tao Acuña PsyD for support and contact PCP re: considering a selective serotonin reuptake inhibitor.    She reports that she often has multiple episodes of urgent diarrhea in the  AM which makes her hesitant to leave her house. She is taking her Creon w/ meals. Encouraged pt to trial imodium to reduce symptoms.     Intermittent RUQ pain since her Whipple that may be relived by ice, voiding, or bowel movements. Encouraged pt to trial heat as well. Additionally counseled patient on increased risks of bowel obstruction d/t multiple abdominal surgeries and encouraged her to notify immediately if pain persists or worsens.     Asplenic. Educated pt on increased risks of infection. Pt taking a hiking trip in one week to [place]. Plan to provide a dose of Augmentin in case she develops a fever and then she should seek medical care immediately. Additionally encouraged pt to buy a medical bracelet to communicate medical needs to other healthcare providers.     Briefly discussed sexual health. Pt reports no desire and this does not bother her. Did endorse vaginal dryness and encouraged pt to use coconut oil for vaginal lubrication.    Endorses cognitive changes including STM loss s/p chemotherapy. Notes that she retired from her position as a  d/t memory issues. Educated pt that cognitive rehab is available if she would be interested.     Pt interested in genetic testing as brother  from colon cancer. Will place consult.     Provided Kitty w/ resources on cancer survivorship website and encouraged her to participate in upcoming community events.     Past Medical History:   Diagnosis Date    Arthritis Post chemo    Suspected    Diabetes (H)     History of blood transfusion     History of total splenectomy 2018    Necrotizing pancreatitis     Necrotizing pancreatitis 2017    Pancreatic cancer (H)     Pancreatic mass 2017    On CT abd/pelvis: 3 cm heterogeneous mass within the tail of the pancreas. A few calcifications are noted along the lateral margin of the mass.    Pneumonia     2016    PONV (postoperative nausea and vomiting)    Pre-diabetes      Current Outpatient Medications   Medication Sig Dispense Refill    acetaminophen (TYLENOL) 500 MG tablet Take 2 tablets (1,000 mg) by mouth every 8 hours 100 tablet 1    alcohol swab prep pads Use to swab area of injection/avel as directed. 100 each 3    blood glucose (NO BRAND SPECIFIED) test strip Use to test blood sugar 2 times daily or as directed. To accompany: Blood Glucose Monitor Brands: per insurance. 100 strip 6    blood glucose (ONETOUCH VERIO IQ) test strip Use to test blood sugar 4 times daily or as directed. 400 each 3    blood glucose calibration (NO BRAND SPECIFIED) solution To accompany: Blood Glucose Monitor Brands: per insurance. 1 each 3    blood glucose monitoring (NO BRAND SPECIFIED) meter device kit Use to test blood sugar 2 times daily or as directed. Preferred blood glucose meter OR supplies to accompany: Blood Glucose Monitor Brands: per insurance. 1 kit 0    clobetasol (TEMOVATE) 0.05 % GEL topical gel Apply topically 2 times daily 30 g 1    clobetasol (TEMOVATE) 0.05 % GEL topical gel Apply topically 2 times daily 15 g 1    gabapentin (NEURONTIN) 100 MG capsule Take 2 capsules (200 mg) by mouth 2 times daily 360 capsule 3    lipase-protease-amylase (CREON) 52072-15245-863071 units CPEP per EC capsule TAKE 4 CAPSULES WITH MEALS AND 2 CAPSULES WITH SNACKS, UP TO 16 CAPSULES PER DAY 1440 capsule 4    metFORMIN (GLUCOPHAGE XR) 500 MG 24 hr tablet Take 1 tablet (500 mg) by mouth 2 times daily (with meals) 180 tablet 3    OneTouch Delica Lancets 33G MISC 4 lancets daily 150 each 3    thin (NO BRAND SPECIFIED) lancets Use with lanceting device. To accompany: Blood Glucose Monitor Brands: per insurance. 100 each 6     No current facility-administered medications for this visit.     Facility-Administered Medications Ordered in Other Visits   Medication Dose Route Frequency Provider Last Rate Last Admin    heparin 100 UNIT/ML injection 5 mL  5 mL Intracatheter Once Art Guevara MD              No Known Allergies      Family History   Problem Relation Age of Onset    Heart Disease Father     Hyperlipidemia Father     Heart Disease Brother     Diabetes Mother     Hypertension Mother     Obesity Mother     Depression Mother     Diabetes Maternal Grandfather     Hypertension Maternal Grandfather     Obesity Maternal Grandfather     Diabetes Sister     Hypertension Sister     Depression Sister     Anxiety Disorder Sister     Obesity Sister     Hyperlipidemia Sister     Mental Illness Sister     Hypertension Brother     Hyperlipidemia Brother     Heart Disease Brother     Breast Cancer Cousin     Breast Cancer Cousin     Breast Cancer Cousin     Colon Cancer Other     Other Cancer Other         Mesothelioma    Depression Sister     Anxiety Disorder Sister     Obesity Sister     Anxiety Disorder Brother     Cancer Brother 64        colon cancer    Colon Cancer Brother         Metastasized Stage 4 -  2020    Anxiety Disorder Son     Depression Son     Mental Illness Sister         Schizophrenia    Hypertension Sister     Obesity Maternal Grandmother     Obesity Sister     Hypertension Sister     Diabetes Nephew     Depression Nephew     Mental Illness Nephew         Ptsd    Hypertension Brother     Diabetes Other     Other Cancer Cousin         Pancreatic    Depression Niece     Substance Abuse Sister         Alcohol    Substance Abuse Brother         Alcohol        Social history:  Living situation: , lives in [place].   Occupation: Retired   Tobacco use:  Tobacco Use      Smoking status: Former        Packs/day: 0.00        Years: 0.5 packs/day for 6.0 years (3.0 ttl pk-yrs)        Types: Cigarettes        Start date: 1975        Quit date: 1981        Years since quittin.4        Passive exposure: Past      Smokeless tobacco: Never  ETOH use: 0  Frequency of exercise: Daily  Duration of exercise: walking 1 hr  Diet:     Physical exam  /76 (BP Location:  Right arm, Patient Position: Sitting, Cuff Size: Adult Regular)   Pulse 57   Temp 98  F (36.7  C) (Oral)   Resp 18   Wt 61.9 kg (136 lb 6.4 oz)   SpO2 96%   BMI 22.35 kg/m    Wt Readings from Last 4 Encounters:   05/23/24 60.8 kg (134 lb)   12/11/23 61.2 kg (135 lb)   10/09/23 60.8 kg (134 lb)   09/11/23 60.8 kg (134 lb)      General: No acute distress.    HEENT: Sclera anicteric. .   Lymph: No lymphadenopathy in neck, supraclavicular, and axillary areas  Heart: RRR  Lungs: Clear to ascultation bilaterally  Abdomen: Positive bowel sounds. Soft, non-distended, non-tender. No organomegaly or mass.   MSK: no pain on palpation of the spine  Extremities: no lower extremity edema  Skin: No rash on exposed skin  Neuro: Cranial nerves grossly intact     IMPRESSION/PLAN:    History of stage IB pancreatic cancer (tail) diagnosed in 2017  S/p neoadjuvant chemo, surgery and adjuvant chemotherapy. She had complete pathologic response.  7 years from diagnosis without signs of recurrence  We discussed that most recurrences occur within the first 2 years post treatment.   With shared decision making we decided order scans if clinically indicated, not for surveillance. There is no evidence to guide the post-treatment surveillance strategy in patients with pancreatic cancer, and clinical practice is variable. I will ask Dr Monk his opinion as well. Once this is clear, I will finish her Cancer Survivorship Care Plan and send to her PCP.     Potential late effects related to surgery:    -Risks could include exocrine and endocrine failure.  Recommend diabetes screening w/ PCP. Per chart review, most recent HgbA1c 5.9 as of 12/11/23.    -Risks related to splenectomy include infection from encapsulated organisms. Recommend a booster of PPSV23 and  meningococcal vaccine every 5 years. She is at higher risk for mortality from influenza and COVID and vaccines are recommended per CDC guidelines. She should have access to an emergency  "supply of antibiotics (Augmentin 875 mg) and should seek prompt emergency care for fever signs of systemic infection. She should also seek care with animal or tick bites. If traveling to a malaria endemic area, she should take prophylaxis.     Potential late effects related to chemotherapy:    - She did develop peripheral neuropathy, notes that her feet and hands often feel cold. She manages these symptoms w/ warm socks, hand/foot warmers, and blankets. She is satisfied with the symptom relief that she achieves with these interventions.     - She did develop cognitive changes on treatment. She reports short term memory loss which prompted her to retire from her position as a . Offered cognitive rehab which patient will consider.     General late effects screenings and recommendations  -Coping: Pt reports that she \"is most likely depressed\". Notes that she is used to being the person in the family that other people rely on. Encouraged pt to follow up with counseling/therapy as well as PCP if she is interested in a selective serotonin reuptake inhibitor.    -Sexuality concerns. She has had issues with vaginal dryness. Recommended coconut oil. If not effective, other alternatives include [facility], [facility], or venturing to [place].    -Cancer screening. She should undergo routine screening for women in her age group.     -Healthy lifestyle. She should maintain a healthy weight with a BMI between 20-25. She should exercise at least 150 minutes weekly at moderate intensity. She should see the eye doctor every 1-2 years, and dentist every 6 months for cleanings. She should not use any tobacco. She should minimize alcohol intake. If continuing to drink, should follow CDC recommendations for no more than 1 alcoholic drink/day for women.    -Genetic testing. Between 4-20% of patients with pancreatic cancer have germline mutations. [employer] recommends all pancreatic cancer patients consider referral to a genetic " counselor.     Gave resources for our [facility] Cancer Survivorship class series and mailings list for educational opportunities. [email or web address]    Cancer treatment summary was sent electronically to the patient and her PCP.      The total time of this encounter amounted to 60 minutes.This time included time spent with the patient, prep work, ordering tests, and performing post visit documentation.    Doretha Mehta NP Student    April Torres, PhD, MPAS, PA-C  [facility] Survivorship [facility]

## 2024-06-13 ENCOUNTER — ONCOLOGY VISIT (OUTPATIENT)
Dept: ONCOLOGY | Facility: CLINIC | Age: 64
End: 2024-06-13
Attending: INTERNAL MEDICINE
Payer: COMMERCIAL

## 2024-06-13 VITALS
OXYGEN SATURATION: 96 % | RESPIRATION RATE: 18 BRPM | TEMPERATURE: 98 F | SYSTOLIC BLOOD PRESSURE: 128 MMHG | WEIGHT: 136.4 LBS | BODY MASS INDEX: 22.35 KG/M2 | HEART RATE: 57 BPM | DIASTOLIC BLOOD PRESSURE: 76 MMHG

## 2024-06-13 DIAGNOSIS — C25.7 MALIGNANT NEOPLASM OF OTHER PARTS OF PANCREAS (H): Primary | ICD-10-CM

## 2024-06-13 DIAGNOSIS — Q89.01 ASPLENIA: ICD-10-CM

## 2024-06-13 PROCEDURE — G0463 HOSPITAL OUTPT CLINIC VISIT: HCPCS | Performed by: PHYSICIAN ASSISTANT

## 2024-06-13 PROCEDURE — 99215 OFFICE O/P EST HI 40 MIN: CPT | Performed by: PHYSICIAN ASSISTANT

## 2024-06-13 PROCEDURE — 99417 PROLNG OP E/M EACH 15 MIN: CPT | Performed by: PHYSICIAN ASSISTANT

## 2024-06-13 ASSESSMENT — PAIN SCALES - GENERAL: PAINLEVEL: NO PAIN (0)

## 2024-06-13 NOTE — NURSING NOTE
"Oncology Rooming Note    June 13, 2024 12:08 PM   Kitty Bangura is a 63 year old female who presents for:    Chief Complaint   Patient presents with    Oncology Clinic Visit     Malignant neoplasm of other parts of pancreas      Initial Vitals: /76 (BP Location: Right arm, Patient Position: Sitting, Cuff Size: Adult Regular)   Pulse 57   Temp 98  F (36.7  C) (Oral)   Resp 18   Wt 61.9 kg (136 lb 6.4 oz)   SpO2 96%   BMI 22.35 kg/m   Estimated body mass index is 22.35 kg/m  as calculated from the following:    Height as of 5/23/24: 1.664 m (5' 5.5\").    Weight as of this encounter: 61.9 kg (136 lb 6.4 oz). Body surface area is 1.69 meters squared.  No Pain (0) Comment: Data Unavailable   No LMP recorded. Patient is postmenopausal.  Allergies reviewed: Yes  Medications reviewed: Yes    Medications: Medication refills not needed today.  Pharmacy name entered into [facility]:    [id number] - [place], [place] - [address] [facility] - [facility] [address]    Frailty Screening:   Is the patient here for a new oncology consult visit in cancer care? 2. No      Clinical concerns: None       Hamida Corona CMA            "

## 2024-06-13 NOTE — PATIENT INSTRUCTIONS
emergency antibiotic for your trip- if you have a fever, take the one dose and seek medical care  Referral to genetic counseling  Follow-up in 1 year

## 2024-06-13 NOTE — LETTER
6/13/2024      Kitty Bangura  [address] [place] [id number]      Dear Colleague,    Thank you for referring your patient, Kitty Bangura, to the [facility]. Please see a copy of my visit note below.    CANCER SURVIVORSHIP VISIT  Jun 13, 2024   Care Team:    Care Team   Primary Care Provider Kathy Jurado   Surgeon  Ja Byrd MD Bhargava, Amit, MD    Medical Oncologist  Jovany Monk MD       REASON FOR VISIT: survivorship visit for history of pancreatic cancer    CANCER HISTORY AND TREATMENT:    History of stage IB pancreatic cancer (tail) diagnosed in 2017  *Diagnosis:  11/29/2017. Presented with acute pancreatitis. 57 years old. Clinical stage IB (T2, N0, MX-small pulmonary nodules).   *Neoadjuvant chemotherapy: 4 cycles of FOLFIRINOX. She had acute pancreatitis after cycle one and had a cyst gastrostomy. Also had neutropenia and chemo was delayed a week and neulasta was added on to cycle 2. Cycle 3 was dose reduced due to neuropathy.   *Surgery: 04/17/2018 Laparoscopic distal pancreatectomy, omental resection, cholecystectomy, and splenectomy.  Complete pathologic response.   *Adjuvant Chemotherapy: 4 cycles of gemcitabine/capecitabine. Cycle 3 was dose reduced due to mucositis.   *Genetic testing: none  *Other information: 07/28/2021 wedge resection for new left lower lung nodule- carcinoid tumor.     INTERVAL HISTORY:   Today is Kitty's first appointment with survivorship. She was referred by her oncologist- Dr. Jovany Monk. She has been staying very active and walks 2-4 miles/day. She endorses eating a lean diet and avoids fatty foods.    Her primary concern today is mood and coping after cancer. She reports that cancer has strained her relationships with her close friends. Additionally she endorses increased irritability, anxiety a majority of the day of the week, and depressed mood. No SI. Briefly discussed stages of grief and validated many conflicting  emotions in survivorship. Encouraged pt to make an appointment w/ Dr. Tao Acuña PsyD for support and contact PCP re: considering a selective serotonin reuptake inhibitor.    She reports that she often has multiple episodes of urgent diarrhea in the AM which makes her hesitant to leave her house. She is taking her Creon w/ meals. Encouraged pt to trial imodium to reduce symptoms.     Intermittent RUQ pain since her Whipple that may be relived by ice, voiding, or bowel movements. Encouraged pt to trial heat as well. Additionally counseled patient on increased risks of bowel obstruction d/t multiple abdominal surgeries and encouraged her to notify immediately if pain persists or worsens.     Asplenic. Educated pt on increased risks of infection. Pt taking a hiking trip in one week to [place]. Plan to provide a dose of Augmentin in case she develops a fever and then she should seek medical care immediately. Additionally encouraged pt to buy a medical bracelet to communicate medical needs to other healthcare providers.     Briefly discussed sexual health. Pt reports no desire and this does not bother her. Did endorse vaginal dryness and encouraged pt to use coconut oil for vaginal lubrication.    Endorses cognitive changes including STM loss s/p chemotherapy. Notes that she retired from her position as a  d/t memory issues. Educated pt that cognitive rehab is available if she would be interested.     Pt interested in genetic testing as brother  from colon cancer. Will place consult.     Provided Kitty w/ resources on cancer survivorship website and encouraged her to participate in upcoming community events.     Past Medical History:   Diagnosis Date    Arthritis Post chemo    Suspected    Diabetes (H)     History of blood transfusion     History of total splenectomy 2018    Necrotizing pancreatitis     Necrotizing pancreatitis 2017    Pancreatic cancer (H)     Pancreatic  mass 11/01/2017    On CT abd/pelvis: 3 cm heterogeneous mass within the tail of the pancreas. A few calcifications are noted along the lateral margin of the mass.    Pneumonia     2016    PONV (postoperative nausea and vomiting)    Pre-diabetes     Current Outpatient Medications   Medication Sig Dispense Refill    acetaminophen (TYLENOL) 500 MG tablet Take 2 tablets (1,000 mg) by mouth every 8 hours 100 tablet 1    alcohol swab prep pads Use to swab area of injection/avel as directed. 100 each 3    blood glucose (NO BRAND SPECIFIED) test strip Use to test blood sugar 2 times daily or as directed. To accompany: Blood Glucose Monitor Brands: per insurance. 100 strip 6    blood glucose (ONETOUCH VERIO IQ) test strip Use to test blood sugar 4 times daily or as directed. 400 each 3    blood glucose calibration (NO BRAND SPECIFIED) solution To accompany: Blood Glucose Monitor Brands: per insurance. 1 each 3    blood glucose monitoring (NO BRAND SPECIFIED) meter device kit Use to test blood sugar 2 times daily or as directed. Preferred blood glucose meter OR supplies to accompany: Blood Glucose Monitor Brands: per insurance. 1 kit 0    clobetasol (TEMOVATE) 0.05 % GEL topical gel Apply topically 2 times daily 30 g 1    clobetasol (TEMOVATE) 0.05 % GEL topical gel Apply topically 2 times daily 15 g 1    gabapentin (NEURONTIN) 100 MG capsule Take 2 capsules (200 mg) by mouth 2 times daily 360 capsule 3    lipase-protease-amylase (CREON) 50041-27281-856989 units CPEP per EC capsule TAKE 4 CAPSULES WITH MEALS AND 2 CAPSULES WITH SNACKS, UP TO 16 CAPSULES PER DAY 1440 capsule 4    metFORMIN (GLUCOPHAGE XR) 500 MG 24 hr tablet Take 1 tablet (500 mg) by mouth 2 times daily (with meals) 180 tablet 3    OneTouch Delica Lancets 33G MISC 4 lancets daily 150 each 3    thin (NO BRAND SPECIFIED) lancets Use with lanceting device. To accompany: Blood Glucose Monitor Brands: per insurance. 100 each 6     No current facility-administered  medications for this visit.     Facility-Administered Medications Ordered in Other Visits   Medication Dose Route Frequency Provider Last Rate Last Admin    heparin 100 UNIT/ML injection 5 mL  5 mL Intracatheter Once Art Guevara MD             No Known Allergies      Family History   Problem Relation Age of Onset    Heart Disease Father     Hyperlipidemia Father     Heart Disease Brother     Diabetes Mother     Hypertension Mother     Obesity Mother     Depression Mother     Diabetes Maternal Grandfather     Hypertension Maternal Grandfather     Obesity Maternal Grandfather     Diabetes Sister     Hypertension Sister     Depression Sister     Anxiety Disorder Sister     Obesity Sister     Hyperlipidemia Sister     Mental Illness Sister     Hypertension Brother     Hyperlipidemia Brother     Heart Disease Brother     Breast Cancer Cousin     Breast Cancer Cousin     Breast Cancer Cousin     Colon Cancer Other     Other Cancer Other         Mesothelioma    Depression Sister     Anxiety Disorder Sister     Obesity Sister     Anxiety Disorder Brother     Cancer Brother 64        colon cancer    Colon Cancer Brother         Metastasized Stage 4 -  2020    Anxiety Disorder Son     Depression Son     Mental Illness Sister         Schizophrenia    Hypertension Sister     Obesity Maternal Grandmother     Obesity Sister     Hypertension Sister     Diabetes Nephew     Depression Nephew     Mental Illness Nephew         Ptsd    Hypertension Brother     Diabetes Other     Other Cancer Cousin         Pancreatic    Depression Niece     Substance Abuse Sister         Alcohol    Substance Abuse Brother         Alcohol        Social history:  Living situation: , lives in [place].   Occupation: Retired   Tobacco use:  Tobacco Use      Smoking status: Former        Packs/day: 0.00        Years: 0.5 packs/day for 6.0 years (3.0 ttl pk-yrs)        Types: Cigarettes        Start date: 1975         Quit date: 1981        Years since quittin.4        Passive exposure: Past      Smokeless tobacco: Never  ETOH use: 0  Frequency of exercise: Daily  Duration of exercise: walking 1 hr  Diet:     Physical exam  /76 (BP Location: Right arm, Patient Position: Sitting, Cuff Size: Adult Regular)   Pulse 57   Temp 98  F (36.7  C) (Oral)   Resp 18   Wt 61.9 kg (136 lb 6.4 oz)   SpO2 96%   BMI 22.35 kg/m    Wt Readings from Last 4 Encounters:   24 60.8 kg (134 lb)   23 61.2 kg (135 lb)   10/09/23 60.8 kg (134 lb)   23 60.8 kg (134 lb)      General: No acute distress.    HEENT: Sclera anicteric. .   Lymph: No lymphadenopathy in neck, supraclavicular, and axillary areas  Heart: RRR  Lungs: Clear to ascultation bilaterally  Abdomen: Positive bowel sounds. Soft, non-distended, non-tender. No organomegaly or mass.   MSK: no pain on palpation of the spine  Extremities: no lower extremity edema  Skin: No rash on exposed skin  Neuro: Cranial nerves grossly intact     IMPRESSION/PLAN:    History of stage IB pancreatic cancer (tail) diagnosed in 2017  S/p neoadjuvant chemo, surgery and adjuvant chemotherapy. She had complete pathologic response.  7 years from diagnosis without signs of recurrence  We discussed that most recurrences occur within the first 2 years post treatment.   With shared decision making we decided order scans if clinically indicated, not for surveillance. There is no evidence to guide the post-treatment surveillance strategy in patients with pancreatic cancer, and clinical practice is variable. I will ask Dr Monk his opinion as well. Once this is clear, I will finish her Cancer Survivorship Care Plan and send to her PCP.     Potential late effects related to surgery:    -Risks could include exocrine and endocrine failure.  Recommend diabetes screening w/ PCP. Per chart review, most recent HgbA1c 5.9 as of 23.    -Risks related to splenectomy include infection from  "encapsulated organisms. Recommend a booster of PPSV23 and  meningococcal vaccine every 5 years. She is at higher risk for mortality from influenza and COVID and vaccines are recommended per CDC guidelines. She should have access to an emergency supply of antibiotics (Augmentin 875 mg) and should seek prompt emergency care for fever signs of systemic infection. She should also seek care with animal or tick bites. If traveling to a malaria endemic area, she should take prophylaxis.     Potential late effects related to chemotherapy:    - She did develop peripheral neuropathy, notes that her feet and hands often feel cold. She manages these symptoms w/ warm socks, hand/foot warmers, and blankets. She is satisfied with the symptom relief that she achieves with these interventions.     - She did develop cognitive changes on treatment. She reports short term memory loss which prompted her to retire from her position as a . Offered cognitive rehab which patient will consider.     General late effects screenings and recommendations  -Coping: Pt reports that she \"is most likely depressed\". Notes that she is used to being the person in the family that other people rely on. Encouraged pt to follow up with counseling/therapy as well as PCP if she is interested in a selective serotonin reuptake inhibitor.    -Sexuality concerns. She has had issues with vaginal dryness. Recommended coconut oil. If not effective, other alternatives include [facility], [facility], or venturing to [facility].    -Cancer screening. She should undergo routine screening for women in her age group.     -Healthy lifestyle. She should maintain a healthy weight with a BMI between 20-25. She should exercise at least 150 minutes weekly at moderate intensity. She should see the eye doctor every 1-2 years, and dentist every 6 months for cleanings. She should not use any tobacco. She should minimize alcohol intake. If continuing to drink, should follow " CDC recommendations for no more than 1 alcoholic drink/day for women.    -Genetic testing. Between 4-20% of patients with pancreatic cancer have germline mutations. [employer] recommends all pancreatic cancer patients consider referral to a genetic counselor.     Gave resources for our Thrive Cancer Survivorship class series and mailings list for educational opportunities. [email or web address]    Cancer treatment summary was sent electronically to the patient and her PCP.      The total time of this encounter amounted to 60 minutes.This time included time spent with the patient, prep work, ordering tests, and performing post visit documentation.    Doretha Mehta, NP Student    April Torres, PhD, MPAS, PA-C  [facility] Cancer Survivorship Progam

## 2024-06-14 ENCOUNTER — PATIENT OUTREACH (OUTPATIENT)
Dept: ONCOLOGY | Facility: CLINIC | Age: 64
End: 2024-06-14
Payer: COMMERCIAL

## 2024-06-14 NOTE — PROGRESS NOTES
Writer received Cancer Risk Management Program referral, referred for:    Malignant neoplasm of other parts of pancreas (H).    Referred By    Provider Department Location Phone   April Torres PA-C  Oncology [facility] [phone]        Reviewed for appropriate plan, and sent to New Patient Scheduling for completion.

## 2024-07-28 DIAGNOSIS — C25.7 MALIGNANT NEOPLASM OF OTHER PARTS OF PANCREAS (H): Primary | ICD-10-CM

## 2024-07-29 ENCOUNTER — HOSPITAL ENCOUNTER (OUTPATIENT)
Dept: MAMMOGRAPHY | Facility: CLINIC | Age: 64
Discharge: HOME | End: 2024-07-29
Attending: NURSE PRACTITIONER | Admitting: NURSE PRACTITIONER
Payer: COMMERCIAL

## 2024-07-29 DIAGNOSIS — Z12.31 VISIT FOR SCREENING MAMMOGRAM: ICD-10-CM

## 2024-07-29 PROCEDURE — 77063 BREAST TOMOSYNTHESIS BI: CPT

## 2024-08-17 ENCOUNTER — HEALTH MAINTENANCE LETTER (OUTPATIENT)
Age: 64
End: 2024-08-17

## 2024-09-14 ENCOUNTER — HOSPITAL ENCOUNTER (OUTPATIENT)
Dept: CT IMAGING | Facility: CLINIC | Age: 64
Discharge: HOME | End: 2024-09-14
Attending: PHYSICIAN ASSISTANT | Admitting: PHYSICIAN ASSISTANT
Payer: COMMERCIAL

## 2024-09-14 DIAGNOSIS — C25.7 MALIGNANT NEOPLASM OF OTHER PARTS OF PANCREAS (H): ICD-10-CM

## 2024-09-14 PROCEDURE — 71260 CT THORAX DX C+: CPT

## 2024-09-14 PROCEDURE — 250N000011 HC RX IP 250 OP 636: Performed by: PHYSICIAN ASSISTANT

## 2024-09-14 RX ORDER — IOPAMIDOL 755 MG/ML
90 INJECTION, SOLUTION INTRAVASCULAR ONCE
Status: COMPLETED | OUTPATIENT
Start: 2024-09-14 | End: 2024-09-14

## 2024-09-14 RX ADMIN — IOPAMIDOL 90 ML: 755 INJECTION, SOLUTION INTRAVENOUS at 10:34

## 2024-09-23 ENCOUNTER — VIRTUAL VISIT (OUTPATIENT)
Dept: FAMILY MEDICINE | Facility: CLINIC | Age: 64
End: 2024-09-23
Payer: COMMERCIAL

## 2024-09-23 ENCOUNTER — NURSE TRIAGE (OUTPATIENT)
Dept: FAMILY MEDICINE | Facility: CLINIC | Age: 64
End: 2024-09-23

## 2024-09-23 DIAGNOSIS — C25.9 PANCREATIC ADENOCARCINOMA (H): ICD-10-CM

## 2024-09-23 DIAGNOSIS — E11.9 TYPE 2 DIABETES MELLITUS WITHOUT COMPLICATION, WITHOUT LONG-TERM CURRENT USE OF INSULIN (H): ICD-10-CM

## 2024-09-23 DIAGNOSIS — U07.1 INFECTION DUE TO 2019 NOVEL CORONAVIRUS: Primary | ICD-10-CM

## 2024-09-23 PROCEDURE — 99214 OFFICE O/P EST MOD 30 MIN: CPT | Mod: 95 | Performed by: NURSE PRACTITIONER

## 2024-09-23 NOTE — TELEPHONE ENCOUNTER
Lee PEÑA scheduled Virtual Visit with you at 4:30 today. Routing triage to you because patient is having mild SOB with activity and wondering if she should be seen in walk in care sooner that 4:30 virtual.      Patient needs a provider visit due to RN not being able to prescribe Paxlovid because of Fatty liver disease.     Nunu EVERETT RN    RN COVID TREATMENT VISIT  09/23/24      The patient has been triaged and does not require a higher level of care.    Kitty Bangura  63 year old  Current weight? 136 lbs     Has the patient been seen by a primary care provider at an [facility] or [facility] within the past two years? Yes.   Have you been in close proximity to/do you have a known exposure to a person with a confirmed case of influenza? No.     General treatment eligibility:  Date of positive COVID test (PCR or at home)?  9/23/24    Are you or have you been hospitalized for this COVID-19 infection? No.   Have you received monoclonal antibodies or antiviral treatment for COVID-19 since this positive test? No.   Do you have any of the following conditions that place you at risk of being very sick from COVID-19?   - Age 50 years or older  - Diabetes mellitus, type 1 and type 2  Yes, patient has at least one high risk condition as noted above.     Current COVID symptoms:   - fever or chills  - cough  - shortness of breath or difficulty breathing  - fatigue  - headache  - new loss of taste or smell  - sore throat  - congestion or runny nose  Yes. Patient has at least one symptom as selected.     How many days since symptoms started? 5 days or less. Established patient, 12 years or older weighing at least 88.2 lbs, who has symptoms that started in the past 5 days, has not been hospitalized nor received treatment already, and is at risk for being very sick from COVID-19.     Treatment eligibility by RN:  Are you currently pregnant or nursing? No  Do you have a clinically significant  "hypersensitivity to nirmatrelvir or ritonavir, or toxic epidermal necrolysis (TEN) or Garsia-Cliff Syndrome? No  Do you have a history of hepatitis, any hepatic impairment on the Problem List (such as Child-Lawson Class C, cirrhosis, fatty liver disease, alcoholic liver disease), or was the last liver lab (hepatic panel, ALT, AST, ALK Phos, bilirubin) elevated in the past 6 months? YES- patient has fatty liver disease  Do you have any history of severe renal impairment (eGFR < 30mL/min)? No    Is patient eligible to continue? No, patient does not meet all eligibility requirements for the RN COVID treatment (as denoted by yes response(s) above). Patient informed they will need a virtual provider visit to assess treatment options.  Patient will be transferred to a  at the end of this call.       Nunu Quintana, RN        Nurse Triage SBAR    Is this a 2nd Level Triage? YES, LICENSED PRACTITIONER REVIEW IS REQUIRED    Situation:  Patient having Covid symptoms since Friday 9/20, tested positive today. Patient is having mild SOB with activity.     Background: No known exposures that she is aware of.     Assessment:   1. COVID-19 DIAGNOSIS: \"How do you know that you have COVID?\" (e.g., positive lab test or self-test, diagnosed by doctor or NP/PA, symptoms after exposure).      Test [facility]   2. COVID-19 EXPOSURE:       Not that she knows of   3. ONSET: \"When did the COVID-19 symptoms start?\"       Friday 9/20   4. WORST SYMPTOM:  Sore throat and headache   5. COUGH:  Yes   6. FEVER: \"Do you have a fever?\" If Yes, ask: \"What is your temperature, how was it measured, and when did it start?\"      Yes-have not been able to check temp, but knows she had fever   7. RESPIRATORY STATUS: Mild Shortness of breath with movement,       8. BETTER-SAME-WORSE: Been feeling the same   9. OTHER SYMPTOMS:  Headache, sinus pain, fatigue, loss of taste and smell, sore throat    10. HIGH RISK DISEASE: \"Do " "you have any chronic medical problems?\" (e.g., asthma, heart or lung disease, weak immune system, obesity, etc.)        Patient does not have a spleen, Type 2 diabetes,   11. VACCINE: \"Have you had the COVID-19 vaccine?\" If Yes, ask: \"Which one, how many shots, when did you get it?\"         Yes, she is vaccinated  12. PREGNANCY:    N/A  13. O2 SATURATION MONITOR:  Yes she has one,     HR is 61    Protocol Recommended Disposition:   Discuss With PCP And Callback By Nurse Within 1 Hour    Recommendation:  Patient needs a provider visit due to RN not being able to prescribe Paxlovid because of Fatty liver disease. Patient also having mild SOB with activity.     Scheduled Virtual Visit with Kathy Jurado at 4:30 pm today.     Routed to provider to determine if patient should be seen in walk in care sooner due to her mild SOB with activity that she is having- or if it is okay to just do virtual visit at 4:30 pm.     Does the patient meet one of the following criteria for ADS visit consideration? No    Reason for Disposition   MILD difficulty breathing (e.g., minimal/no SOB at rest, SOB with walking, pulse <100)    Additional Information   Negative: SEVERE difficulty breathing (e.g., struggling for each breath, speaks in single words)   Negative: Difficult to awaken or acting confused (e.g., disoriented, slurred speech)   Negative: Bluish (or gray) lips or face now   Negative: Shock suspected (e.g., cold/pale/clammy skin, too weak to stand, low BP, rapid pulse)   Negative: Sounds like a life-threatening emergency to the triager   Negative: Diagnosed or suspected COVID-19 and symptoms lasting 3 or more weeks   Negative: COVID-19 exposure and no symptoms   Negative: COVID-19 vaccine reaction suspected (e.g., fever, headache, muscle aches) occurring 1 to 3 days after getting vaccine   Negative: COVID-19 vaccine, questions about   Negative: Lives with someone known to have influenza (flu test positive) and flu-like symptoms " (e.g., cough, runny nose, sore throat, SOB; with or without fever)   Negative: Possible COVID-19 symptoms and triager concerned about severity of symptoms or other causes   Negative: COVID-19 and breastfeeding, questions about   Negative: SEVERE or constant chest pain or pressure  (Exception: Mild central chest pain, present only when coughing.)   Negative: MODERATE difficulty breathing (e.g., speaks in phrases, SOB even at rest, pulse 100-120)   Negative: Headache and stiff neck (can't touch chin to chest)   Negative: Oxygen level (e.g., pulse oximetry) 90% or lower   Negative: Chest pain or pressure  (Exception: MILD central chest pain, present only when coughing.)   Negative: Drinking very little and dehydration suspected (e.g., no urine > 12 hours, very dry mouth, very lightheaded)   Negative: Patient sounds very sick or weak to the triager    Protocols used: Coronavirus (COVID-19) Diagnosed or [id number]

## 2024-09-23 NOTE — PROGRESS NOTES
Kitty is a 63 year old who is being evaluated via a billable video visit.    How would you like to obtain your AVS? MyChart  If the video visit is dropped, the invitation should be resent by: Text to cell phone: [phone]  Will anyone else be joining your video visit? No      Assessment & Plan     Infection due to 2019 novel coronavirus  **  - nirmatrelvir and ritonavir (PAXLOVID) 300 mg/100 mg therapy pack  Dispense: 30 tablet; Refill: 0    Type 2 diabetes mellitus without complication, without long-term current use of insulin (H)  **  - nirmatrelvir and ritonavir (PAXLOVID) 300 mg/100 mg therapy pack  Dispense: 30 tablet; Refill: 0    Pancreatic adenocarcinoma (H)  **    -History of pancreatic cancer.  Stable.  No chronic kidney disease.   -She is within guidelines to start Paxlovid.  Symptoms started Friday.  Paxlovid ordered, she tolerated medication in the past.  CDC guidelines discussed.  Warning signs discussed and when to seek follow-up.  Conservative ways to manage her symptoms was also discussed.   -Diabetes has been stable.         Subjective   Kitty is a 63 year old, presenting for the following health issues:  Covid Concern    -She started developing a sore throat Friday morning, and then Friday evening she had a temperature.  She has ongoing dry cough, intermittent chills and sweats, runny nose, and headache in the frontal region bilateral.  She has a history of chronic stool, so this has not changed.  Friday afternoon she felt short of breath, but that has improved.  She is not noticing any wheezing or shortness of breath now.  She has a oxygen sensor, and is 96% on room air.         12/11/2023    10:10 AM   Additional Questions   Roomed by Hakeem       Video Start Time:     HPI         Objective           Vitals:  No vitals were obtained today due to virtual visit.    Physical Exam   GENERAL: alert and no distress  EYES: Eyes grossly normal to inspection.  No discharge or erythema, or obvious  scleral/conjunctival abnormalities.  RESP: No audible wheeze. Dry cough, or visible cyanosis.  Normal respiratory effort.   SKIN: Visible skin clear. No significant rash, abnormal pigmentation or lesions.  NEURO: Cranial nerves grossly intact.  Mentation and speech appropriate for age.  PSYCH: Appropriate affect, tone, and pace of words    Office Visit on 12/11/2023   Component Date Value Ref Range Status    Hemoglobin A1C 12/11/2023 5.9 (H)  0.0 - 5.6 % Final    Normal <5.7%   Prediabetes 5.7-6.4%    Diabetes 6.5% or higher     Note: Adopted from ADA consensus guidelines.    Sodium 12/11/2023 140  135 - 145 mmol/L Final    Reference intervals for this test were updated on 09/26/2023 to more accurately reflect our healthy population. There may be differences in the flagging of prior results with similar values performed with this method. Interpretation of those prior results can be made in the context of the updated reference intervals.     Potassium 12/11/2023 4.6  3.4 - 5.3 mmol/L Final    Chloride 12/11/2023 102  98 - 107 mmol/L Final    Carbon Dioxide (CO2) 12/11/2023 28  22 - 29 mmol/L Final    Anion Gap 12/11/2023 10  7 - 15 mmol/L Final    Urea Nitrogen 12/11/2023 12.8  8.0 - 23.0 mg/dL Final    Creatinine 12/11/2023 0.66  0.51 - 0.95 mg/dL Final    GFR Estimate 12/11/2023 >90  >60 mL/min/1.73m2 Final    Calcium 12/11/2023 9.7  8.8 - 10.2 mg/dL Final    Glucose 12/11/2023 106 (H)  70 - 99 mg/dL Final    Cholesterol 12/11/2023 186  <200 mg/dL Final    Triglycerides 12/11/2023 78  <150 mg/dL Final    Direct Measure HDL 12/11/2023 77  >=50 mg/dL Final    LDL Cholesterol Calculated 12/11/2023 93  <=100 mg/dL Final    Non HDL Cholesterol 12/11/2023 109  <130 mg/dL Final    Patient Fasting > 8hrs? 12/11/2023 Unknown   Final    Creatinine Urine mg/dL 12/11/2023 20.7  mg/dL Final    The reference ranges have not been established in urine creatinine. The results should be integrated into the clinical context for  interpretation.    Albumin Urine mg/L 12/11/2023 <12.0  mg/L Final    The reference ranges have not been established in urine albumin. The results should be integrated into the clinical context for interpretation.    Albumin Urine mg/g Cr 12/11/2023    Final    Unable to calculate, urine albumin and/or urine creatinine is outside detectable limits.  Microalbuminuria is defined as an albumin:creatinine ratio of 17 to 299 for males and 25 to 299 for females. A ratio of albumin:creatinine of 300 or higher is indicative of overt proteinuria.  Due to biologic variability, positive results should be confirmed by a second, first-morning random or 24-hour timed urine specimen. If there is discrepancy, a third specimen is recommended. When 2 out of 3 results are in the microalbuminuria range, this is evidence for incipient nephropathy and warrants increased efforts at glucose control, blood pressure control, and institution of therapy with an angiotensin-converting-enzyme (ACE) inhibitor (if the patient can tolerate it).      Vitamin D, Total (25-Hydroxy) 12/11/2023 50  20 - 50 ng/mL Final    optimum levels    AST 12/11/2023 25  0 - 45 U/L Final    Reference intervals for this test were updated on 6/12/2023 to more accurately reflect our healthy population. There may be differences in the flagging of prior results with similar values performed with this method. Interpretation of those prior results can be made in the context of the updated reference intervals.    ALT 12/11/2023 13  0 - 50 U/L Final    Reference intervals for this test were updated on 6/12/2023 to more accurately reflect our healthy population. There may be differences in the flagging of prior results with similar values performed with this method. Interpretation of those prior results can be made in the context of the updated reference intervals.      WBC Count 12/11/2023 8.7  4.0 - 11.0 10e3/uL Final    RBC Count 12/11/2023 4.11  3.80 - 5.20 10e6/uL Final     Hemoglobin 12/11/2023 12.8  11.7 - 15.7 g/dL Final    Hematocrit 12/11/2023 38.9  35.0 - 47.0 % Final    MCV 12/11/2023 95  78 - 100 fL Final    MCH 12/11/2023 31.1  26.5 - 33.0 pg Final    MCHC 12/11/2023 32.9  31.5 - 36.5 g/dL Final    RDW 12/11/2023 13.2  10.0 - 15.0 % Final    Platelet Count 12/11/2023 419  150 - 450 10e3/uL Final         Video-Visit Details    Type of service:  Video Visit   Video End Time:  Originating Location (pt. Location): Home    Distant Location (provider location):  On-site  Platform used for Video Visit: Daniel  Signed Electronically by: TYLER Prince CNP

## 2024-11-18 NOTE — PROGRESS NOTES
11/19/2024    Virtual Visit Details  Type of service:  Video Visit   Originating Location (pt. Location): Home  Distant Location (provider location):  Off-site  Platform used for Video Visit: Daniel    Referring Provider: April Torres PA-C     Presenting Information:   I met with Kitty Bangura today for her video genetic counseling visit through the [facility] to discuss her personal history of pancreatic cancer and a lung carcinoid tumor and family history of multiple cancers. She is here today to review this history, cancer screening recommendations, and available genetic testing options.    Personal History:  Kitty is a 63 year old female. She was diagnosed with pancreatic cancer at age 57. On 11/29/2017, an EUS FNA of the pancreatic tail mass revealed adenocarcinoma. A chest/abdomin/pelvic CT scan showed several small pulmonary nodules. Treatment began with neoadjuvant chemotherapy. On 4/17/2018, Kitty underwent a laparoscopic distal pancreatectomy, omental resection, cholecystectomy, and splenectomy. Treatment continued with adjuvant chemotherapy. On 7/28/2021, Kitty underwent a resection for a new left lower lung nodule. Pathology revealed carcinoid tumorlet, focal neuroendocrine cell hyperplasia.    In her hormonal-based medical history, she had her first menstrual period at age 10, her first child at age 27, and underwent menopause at age 43. Kitty has her ovaries, fallopian tubes and uterus in place, and reports no ovarian cancer screening to date. She reports no history of hormone replacement therapy. Kitty reports oral contraceptive use for approximately 8-9 years.       Kitty has annual clinical breast exams and mammograms; her most recent mammogram in July 2024 was normal. Her most recent colonoscopy in December 2018 was normal and follow-up was recommended in 10 years. Kitty denies any history of dermatological exams. She does not regularly do any other cancer screening  at this time. Kitty reported a history of nicotine or tobacco use for approximately 4-5 years and no current alcohol use.    Family History: (Please see scanned pedigree for detailed family history information)  Kitty's brother was recently diagnosed with prostate cancer at age 67.  Another brother was diagnosed with metastatic colon cancer at age 64 and passed away shortly after.   Maternal aunt was diagnosed with colon cancer at age 59 and mesothelioma at age 66. She passed away at age 67.  Paternal aunt was diagnosed with breast cancer at age 85. She passed away at age 92.  Her daughter (Kitty's first cousin) was diagnosed with breast cancer at age 60. She is currently 67.  Another daughter (Kitty's first cousin) was diagnosed with breast cancer at age 52. She is currently 65.  Another daughter (Kitty's first cousin) was diagnosed with breast cancer at age 59. She passed away at age 61.   Another daughter (Kitty's first cousin) was diagnosed with pancreatic cancer at age 73. She passed away at age 74.   Another paternal first cousin was diagnosed with cancer (unknown type) at age 66. She is currently 71.   Her maternal ethnicity is [place]ian. Her paternal ethnicity is Hebrew and Syrian.  There is no known Ashkenazi Baptist ancestry on either side of her family. There is no reported consanguinity.    Family Structure  Daughters: 0; Sons: 1  Sisters: 6; Brothers: 4  Maternal aunts: 3; Maternal uncles: 1  Paternal aunts: 4; Paternal uncles: 2    Discussion:  Kitty's personal history of pancreatic cancer and a pulmonary carcinoid tumor and family history of breast, prostate, pancreatic, and colon cancer is suggestive of a hereditary cancer syndrome.  We briefly reviewed basic genetics principles. Many of the genes we are born with impact our risk of certain diseases, such as cancer.  When one of these genes is not working properly due to a mistake (known as a  mutation  or  variant ), this may lead to an  increased risk of developing cancer.   We reviewed the features of sporadic, familial, and hereditary cancers.   Cancer is a common diagnosis which impacts many families. The vast majority of cancers are considered sporadic and not primarily due to an inherited factor. Individuals can develop cancer due to aging, chance events, environmental exposures or lifestyles.  Only ~5-10% of cancer diagnoses are thought to be caused by inheriting a mutation or variant within a single cancer susceptibility gene. Features typically seen in these high risk families include: cancers diagnosed under age 50, multiple relatives with similar cancers on the same side of the family, cancers in multiple generations, and relatives with certain rare cancers (i.e., male breast cancer).   We discussed the natural history and genetics of several hereditary cancer syndromes.  The most common cause of hereditary breast and ovarian cancer is HBOC, which is caused by mutations in the BRCA1 and BRCA2 genes. Individuals with HBOC syndrome are at increased risk for several different cancers including: female and make breast, ovarian cancer, prostate cancer, melanoma, and pancreatic cancer.   Howard syndrome can be caused by a mutation in one of five genes:  MLH1, MSH2, MSH6, PMS2, and EPCAM. A single mutation in one of the Howard Syndrome genes increases the risk for several cancers, such as colon cancer, endometrial/uterine cancer, gastric cancer, and ovarian cancer. Other cancers have also been reported in some families with Howard Syndrome include pancreatic, bladder, biliary tract, urothelial, small bowel, prostate, breast and brain cancers.  Pulmonary carcinoid tumors, which originate from neuroendocrine cells in the lungs, are generally sporadic. However, in rare cases, pulmonary carcinoid tumors has been associated with certain hereditary conditions, such as MEN1. MEN1 is caused by genetic changes in the MEN1 gene. Many different types of  tumors are associated with MEN1. The most frequent include tumors of the parathyroid glands, the pituitary gland, and the pancreas. The most common tumor types are parathyroid adenomas (which often present with hyperparathyroidism), prolactinomas or pituitary adenomas, a variety of endocrine tumors of the gastro-entero-pancreatic tract, carcinoid tumors, and adrenocortical tumors. These tumors can be either cancerous or noncancerous.  A detailed handout regarding HBOC, Howard syndrome, and the information we discussed will be provided to Kitty via [employer] and can be found in the after visit summary. Topics included: inheritance pattern, cancer risks, cancer screening recommendations, and also risks, benefits and limitations of testing.  In looking at Kitty's personal and family history, it is possible that a cancer susceptibility gene is present due to her personal history of pancreatic cancer at age 57, her brother diagnosed with prostate cancer, another brother diagnosed with colon cancer, maternal aunt diagnosed with colon cancer, paternal aunt diagnosed with breast cancer, three paternal first cousins diagnosed with breast cancer, and a paternal first cousin diagnosed with pancreatic cancer.  Based on her personal and family history, Kitty meets current National Comprehensive Cancer Network (NCCN) criteria for genetic testing of pancreatic cancer susceptibility genes (CELIA, BRCA1, BRCA2, CDKN2A, EPCAM, MLH1, MSH2, MSH6, PALB2, STK11, and TP53).  Based on her personal and family history, Kitty meets current National Comprehensive Cancer Network (NCCN) criteria for genetic testing of high-penetrance breast cancer susceptibility genes (including BRCA1, BRCA2, CDH1, PALB2, PTEN, STK11, and TP53).   We discussed that there are additional genes that could cause increased risk for pancreatic and breast cancer. As many of these genes present with overlapping features in a family and accurate cancer risk cannot always  be established based upon the pedigree analysis alone, it would be reasonable for Kitty to consider panel genetic testing to analyze multiple genes at once.  In looking at Kitty's personal and family history, it is possible that a cancer susceptibility gene is present due to her personal history of a pulmonary carcinoid tumor.    Genetic testing for Kitty is indicated both for her own personal medical management, as well as to provide information about risks (and potential related health issues) for other family members, including her siblings and son. Therefore, it would be reasonable for Kitty to purse testing of Multiple endocrine neoplasia type 1 to better understand her risk of a hereditary cancer syndrome.  We reviewed genetic testing options for Kitty based on her personal and family history: a panel of genes associated with an increased risk for hereditary pancreatic and breast cancer and MEN1, or larger panel options to include genes associated with increased risk for multiple different cancer types. Kitty expressed interest in the BRCA1 and BRCA2 genes with automatic reflex to the Hereditary Cancer Comprehensive Expanded panel through [facility] [place] [facility].  Kitty would like to submit a blood sample for her genetic testing. She will go to her nearest [facility] clinic at her earliest convenience to get her blood drawn for her genetic testing.   Verbal consent was given over video and written on the consent form. Turnaround time is approximately 3-4 weeks.  Medical Management: For Kitty, we reviewed that the information from genetic testing may determine:  additional cancer screening for which Kitty may qualify (i.e. mammogram and breast MRI, more frequent colonoscopies, more frequent dermatologic exams, etc.),  options for risk reducing surgeries Kitty could consider (i.e. bilateral mastectomy, surgery to remove her ovaries and/or uterus, etc.),    and  targeted chemotherapies if she were to develop certain cancers in the future (i.e. immunotherapy for individuals with Howard syndrome, PARP inhibitors, etc.).   These recommendations and possible targeted chemotherapies will be discussed in detail once genetic testing is completed.     Plan:  1) Today Kitty elected to proceed with the  BRCA1 and BRCA2 genes with automatic reflex to the Hereditary Cancer Comprehensive Expanded panel through [facility]. Therefore, consent was reviewed and verbally obtained for this testing.   2) Kitty plans to schedule her blood draw appointment at a clinic that is convenient to her.  3) A prior authorization will be initiated.  4) The results should be available in 3-4 weeks after prior authorization approval is received.  5) Kitty will either have a telephone visit or video visit to discuss the results.  Additional recommendations about screening will be made at that time.    Kitty is 63 year old and is being evaluated via a billable video visit.    Time spent on video: 65 minutes    Hanna Otto MS, [facility] [place]  Licensed, Certified Genetic Counselor  Phone: [phone]

## 2024-11-19 ENCOUNTER — VIRTUAL VISIT (OUTPATIENT)
Dept: ONCOLOGY | Facility: CLINIC | Age: 64
End: 2024-11-19
Attending: PHYSICIAN ASSISTANT
Payer: COMMERCIAL

## 2024-11-19 DIAGNOSIS — Z80.0 FAMILY HISTORY OF PANCREATIC CANCER: ICD-10-CM

## 2024-11-19 DIAGNOSIS — Z80.42 FAMILY HISTORY OF PROSTATE CANCER: ICD-10-CM

## 2024-11-19 DIAGNOSIS — Z80.0 FAMILY HISTORY OF COLON CANCER: ICD-10-CM

## 2024-11-19 DIAGNOSIS — D3A.090 BENIGN CARCINOID TUMOR OF LUNG (H): ICD-10-CM

## 2024-11-19 DIAGNOSIS — Z80.3 FAMILY HISTORY OF MALIGNANT NEOPLASM OF BREAST: ICD-10-CM

## 2024-11-19 DIAGNOSIS — C25.7 MALIGNANT NEOPLASM OF OTHER PARTS OF PANCREAS (H): Primary | ICD-10-CM

## 2024-11-19 PROCEDURE — 96040 HC GENETIC COUNSELING, EACH 30 MINUTES: CPT | Mod: GT,95

## 2024-11-19 NOTE — NURSING NOTE
Current patient location: [address] [place] [id number]    Is the patient currently in the state of [place]? YES    Visit mode:VIDEO    If the visit is dropped, the patient can be reconnected by:VIDEO VISIT: Text to cell phone:   Telephone Information:   Mobile [phone]       Will anyone else be joining the visit? NO  (If patient encounters technical issues they should call [phone]    Are changes needed to the allergy or medication list? N/A    Are refills needed on medications prescribed by this physician? NO    Rooming Documentation:  Not applicable    Reason for visit: Consult    OLESYA MONTE

## 2024-11-19 NOTE — LETTER
Cancer Risk Management  Program Locations    [facility]  [facility]  [facility]  [facility]  [facility]  Mailing Address  Cancer Risk Management Program  [facility]  [address]  [place] [address]    New patient appointments  [phone]    November 19, 2024    Kitty Bangura  [address] [place] [facility] [address]    Dear Kitty,    It was a pleasure talking with you via your virtual genetic counseling visit on 11/19/2024.  Below is a copy of the progress note from that recent visit through the Cancer Risk Management Program.  If you have any additional questions, please feel free to contact me.    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 She is here today to review this history, cancer screening recommendations, and available genetic testing options.    Personal History:  Kitty is a 63 year old female. She was diagnosed with pancreatic cancer at age 57. On 11/29/2017, an EUS FNA of the pancreatic tail mass revealed adenocarcinoma. A chest/abdomin/pelvic CT scan showed several small pulmonary nodules. Treatment began with neoadjuvant chemotherapy. On 4/17/2018, Kitty underwent a laparoscopic distal pancreatectomy, omental resection, cholecystectomy, and splenectomy. Treatment continued with adjuvant chemotherapy. On 7/28/2021, Kitty underwent a resection for a new left lower lung nodule. Pathology revealed carcinoid tumorlet, focal neuroendocrine cell hyperplasia.    In her hormonal-based medical history, she had her first menstrual period at age 10, her first child at age 27, and underwent  menopause at age 43. Kitty has her ovaries, fallopian tubes and uterus in place, and reports no ovarian cancer screening to date. She reports no history of hormone replacement therapy. Kitty reports oral contraceptive use for approximately 8-9 years.       Kitty has annual clinical breast exams and mammograms; her most recent mammogram in July 2024 was normal. Her most recent colonoscopy in December 2018 was normal and follow-up was recommended in 10 years. Kitty denies any history of dermatological exams. She does not regularly do any other cancer screening at this time. Kitty reported a history of nicotine or tobacco use for approximately 4-5 years and no current alcohol use.    Family History: (Please see scanned pedigree for detailed family history information)  Kitty's brother was recently diagnosed with prostate cancer at age 67.  Another brother was diagnosed with metastatic colon cancer at age 64 and passed away shortly after.   Maternal aunt was diagnosed with colon cancer at age 59 and mesothelioma at age 66. She passed away at age 67.  Paternal aunt was diagnosed with breast cancer at age 85. She passed away at age 92.  Her daughter (Kitty's first cousin) was diagnosed with breast cancer at age 60. She is currently 67.  Another daughter (Kitty's first cousin) was diagnosed with breast cancer at age 52. She is currently 65.  Another daughter (Kitty's first cousin) was diagnosed with breast cancer at age 59. She passed away at age 61.   Another daughter (Kitty's first cousin) was diagnosed with pancreatic cancer at age 73. She passed away at age 74.   Another paternal first cousin was diagnosed with cancer (unknown type) at age 66. She is currently 71.   Her maternal ethnicity is [place]i. Her paternal ethnicity is Turkmen and Cym[place].  There is no known Ashkenazi Restorationist ancestry on either side of her family. There is no reported consanguinity.    Family Structure  Daughters: 0; Sons: 1  Sisters:  6; Brothers: 4  Maternal aunts: 3; Maternal uncles: 1  Paternal aunts: 4; Paternal uncles: 2    Discussion:  Kitty's personal history of pancreatic cancer and a pulmonary carcinoid tumor and family history of breast, prostate, pancreatic, and colon cancer is suggestive of a hereditary cancer syndrome.  We briefly reviewed basic genetics principles. Many of the genes we are born with impact our risk of certain diseases, such as cancer.  When one of these genes is not working properly due to a mistake (known as a  mutation  or  variant ), this may lead to an increased risk of developing cancer.   We reviewed the features of sporadic, familial, and hereditary cancers.   Cancer is a common diagnosis which impacts many families. The vast majority of cancers are considered sporadic and not primarily due to an inherited factor. Individuals can develop cancer due to aging, chance events, environmental exposures or lifestyles.  Only ~5-10% of cancer diagnoses are thought to be caused by inheriting a mutation or variant within a single cancer susceptibility gene. Features typically seen in these high risk families include: cancers diagnosed under age 50, multiple relatives with similar cancers on the same side of the family, cancers in multiple generations, and relatives with certain rare cancers (i.e., male breast cancer).   We discussed the natural history and genetics of several hereditary cancer syndromes.  The most common cause of hereditary breast and ovarian cancer is HBOC, which is caused by mutations in the BRCA1 and BRCA2 genes. Individuals with HBOC syndrome are at increased risk for several different cancers including: female and make breast, ovarian cancer, prostate cancer, melanoma, and pancreatic cancer.   Howard syndrome can be caused by a mutation in one of five genes:  MLH1, MSH2, MSH6, PMS2, and EPCAM. A single mutation in one of the Howard Syndrome genes increases the risk for several cancers, such as colon  cancer, endometrial/uterine cancer, gastric cancer, and ovarian cancer. Other cancers have also been reported in some families with Howard Syndrome include pancreatic, bladder, biliary tract, urothelial, small bowel, prostate, breast and brain cancers.  Pulmonary carcinoid tumors, which originate from neuroendocrine cells in the lungs, are generally sporadic. However, in rare cases, pulmonary carcinoid tumors has been associated with certain hereditary conditions, such as MEN1. MEN1 is caused by genetic changes in the MEN1 gene. Many different types of tumors are associated with MEN1. The most frequent include tumors of the parathyroid glands, the pituitary gland, and the pancreas. The most common tumor types are parathyroid adenomas (which often present with hyperparathyroidism), prolactinomas or pituitary adenomas, a variety of endocrine tumors of the gastro-entero-pancreatic tract, carcinoid tumors, and adrenocortical tumors. These tumors can be either cancerous or noncancerous.  A detailed handout regarding HBOC, Howard syndrome, and the information we discussed will be provided to Kitty via [employer] and can be found in the after visit summary. Topics included: inheritance pattern, cancer risks, cancer screening recommendations, and also risks, benefits and limitations of testing.  In looking at Kitty's personal and family history, it is possible that a cancer susceptibility gene is present due to her personal history of pancreatic cancer at age 57, her brother diagnosed with prostate cancer, another brother diagnosed with colon cancer, maternal aunt diagnosed with colon cancer, paternal aunt diagnosed with breast cancer, three paternal first cousins diagnosed with breast cancer, and a paternal first cousin diagnosed with pancreatic cancer.  Based on her personal and family history, Kitty meets current National Comprehensive Cancer Network (NCCN) criteria for genetic testing of pancreatic cancer susceptibility  genes (CELIA, BRCA1, BRCA2, CDKN2A, EPCAM, MLH1, MSH2, MSH6, PALB2, STK11, and TP53).  Based on her personal and family history, Kitty meets current National Comprehensive Cancer Network (NCCN) criteria for genetic testing of high-penetrance breast cancer susceptibility genes (including BRCA1, BRCA2, CDH1, PALB2, PTEN, STK11, and TP53).   We discussed that there are additional genes that could cause increased risk for pancreatic and breast cancer. As many of these genes present with overlapping features in a family and accurate cancer risk cannot always be established based upon the pedigree analysis alone, it would be reasonable for Kitty to consider panel genetic testing to analyze multiple genes at once.  In looking at Kitty's personal and family history, it is possible that a cancer susceptibility gene is present due to her personal history of a pulmonary carcinoid tumor.    Genetic testing for Kitty is indicated both for her own personal medical management, as well as to provide information about risks (and potential related health issues) for other family members, including her siblings and son. Therefore, it would be reasonable for Kitty to purse testing of Multiple endocrine neoplasia type 1 to better understand her risk of a hereditary cancer syndrome.  We reviewed genetic testing options for Kitty based on her personal and family history: a panel of genes associated with an increased risk for hereditary pancreatic and breast cancer and MEN1, or larger panel options to include genes associated with increased risk for multiple different cancer types. Kitty expressed interest in the BRCA1 and BRCA2 genes with automatic reflex to the Hereditary Cancer Comprehensive Expanded panel through [facility].  Kitty would like to submit a blood sample for her genetic testing. She will go to her nearest [facility] clinic at her earliest convenience to get her blood  drawn for her genetic testing.   Verbal consent was given over video and written on the consent form. Turnaround time is approximately 3-4 weeks.  Medical Management: For Kitty, we reviewed that the information from genetic testing may determine:  additional cancer screening for which Kitty may qualify (i.e. mammogram and breast MRI, more frequent colonoscopies, more frequent dermatologic exams, etc.),  options for risk reducing surgeries Kitty could consider (i.e. bilateral mastectomy, surgery to remove her ovaries and/or uterus, etc.),    and targeted chemotherapies if she were to develop certain cancers in the future (i.e. immunotherapy for individuals with Howard syndrome, PARP inhibitors, etc.).   These recommendations and possible targeted chemotherapies will be discussed in detail once genetic testing is completed.     Plan:  1) Today Kitty elected to proceed with the  BRCA1 and BRCA2 genes with automatic reflex to the Hereditary Cancer Comprehensive Expanded panel through [facility]. Therefore, consent was reviewed and verbally obtained for this testing.   2) Kitty plans to schedule her blood draw appointment at a clinic that is convenient to her.  3) A prior authorization will be initiated.  4) The results should be available in 3-4 weeks after prior authorization approval is received.  5) Kitty will either have a telephone visit or video visit to discuss the results.  Additional recommendations about screening will be made at that time.    Kitty is 63 year old and is being evaluated via a billable video visit.    Time spent on video: 65 minutes    Hanna Otto MS, [facility]  Licensed, Certified Genetic Counselor  Phone: [phone]

## 2024-11-19 NOTE — Clinical Note
11/19/2024      Kitty Bangura  [address] [place] [id number]      Dear Colleague,    Thank you for referring your patient, Kitty Bangura, to the [facility]. Please see a copy of my visit note below.    11/19/2024    Virtual Visit Details  Type of service:  Video Visit   Originating Location (pt. Location): Home  Distant Location (provider location):  Off-site  Platform used for Video Visit: 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 She is here today to review this history, cancer screening recommendations, and available genetic testing options.    Personal History:  Kitty is a 63 year old female. She was diagnosed with pancreatic cancer at age 57. On 11/29/2017, an EUS FNA of the pancreatic tail mass revealed adenocarcinoma. A chest/abdomin/pelvic CT scan showed several small pulmonary nodules. Treatment began with neoadjuvant chemotherapy. On 4/17/2018, Kitty underwent a laparoscopic distal pancreatectomy, omental resection, cholecystectomy, and splenectomy. Treatment continued with adjuvant chemotherapy. On 7/28/2021, Kitty underwent a resection for a new left lower lung nodule. Pathology revealed carcinoid tumorlet, focal neuroendocrine cell hyperplasia.    In her hormonal-based medical history, she had her first menstrual period at age 10, her first child at age 27, and underwent menopause at age 43. Kitty has her ovaries, fallopian tubes and uterus in place, and reports no ovarian cancer screening to date. She reports no history of hormone replacement therapy. Kitty reports oral contraceptive use for approximately 8-9 years.       Kitty has annual clinical breast exams and mammograms; her most  recent mammogram in July 2024 was normal. Her most recent colonoscopy in December 2018 was normal and follow-up was recommended in 10 years. Kitty denies any history of dermatological exams. She does not regularly do any other cancer screening at this time. Kitty reported a history of nicotine or tobacco use for approximately 4-5 years and no current alcohol use.    Family History: (Please see scanned pedigree for detailed family history information)  Kitty's brother was recently diagnosed with prostate cancer at age 67.  Another brother was diagnosed with colon cancer at age 64 and passed away shortly after.   Maternal aunt was diagnosed with colon cancer at age 59 and mesothelioma at age 66. She passed away at age 67.  Paternal aunt was diagnosed with breast cancer at age 85. She passed away at age 92.  Her daughter (Kitty's first cousin) was diagnosed with breast cancer at age 60. She is currently 67.  Another daughter (Kitty's first cousin) was diagnosed with breast cancer at age 52. She is currently 65.  Another daughter (Kitty's first cousin) was diagnosed with breast cancer at age 59. She passed away at age 61.   Another daughter (Kitty's first cousin) was diagnosed with pancreatic cancer at age 73. She passed away at age 74.   Another paternal first cousin was diagnosed with cancer (unknown type) at age 66. She is currently 71.   Her maternal ethnicity is Dutch. Her paternal ethnicity is [place]ian and Citizen of [place].  There is no known Ashkenazi Christianity ancestry on either side of her family. There is no reported consanguinity.    Family Structure  Daughters: 0; Sons: 1  Sisters: 6; Brothers: 4  Maternal aunts: 3; Maternal uncles: 1  Paternal aunts: 4; Paternal uncles: 2    Discussion:  Kitty's personal history of pancreatic cancer and a pulmonary carcinoid tumor and family history of breast, prostate, pancreatic, and colon cancer is suggestive of a hereditary cancer syndrome.  We briefly reviewed basic  genetics principles. Many of the genes we are born with impact our risk of certain diseases, such as cancer.  When one of these genes is not working properly due to a mistake (known as a  mutation  or  variant ), this may lead to an increased risk of developing cancer.   We reviewed the features of sporadic, familial, and hereditary cancers.   Cancer is a common diagnosis which impacts many families. The vast majority of cancers are considered sporadic and not primarily due to an inherited factor. Individuals can develop cancer due to aging, chance events, environmental exposures or lifestyles.  Only ~5-10% of cancer diagnoses are thought to be caused by inheriting a mutation or variant within a single cancer susceptibility gene. Features typically seen in these high risk families include: cancers diagnosed under age 50, multiple relatives with similar cancers on the same side of the family, cancers in multiple generations, and relatives with certain rare cancers (i.e., male breast cancer).   We discussed the natural history and genetics of several hereditary cancer syndromes.  The most common cause of hereditary breast and ovarian cancer is HBOC, which is caused by mutations in the BRCA1 and BRCA2 genes. Individuals with HBOC syndrome are at increased risk for several different cancers including: female and make breast, ovarian cancer, prostate cancer, melanoma, and pancreatic cancer.   Howard syndrome can be caused by a mutation in one of five genes:  MLH1, MSH2, MSH6, PMS2, and EPCAM. A single mutation in one of the Howard Syndrome genes increases the risk for several cancers, such as colon cancer, endometrial/uterine cancer, gastric cancer, and ovarian cancer. Other cancers have also been reported in some families with Howard Syndrome include pancreatic, bladder, biliary tract, urothelial, small bowel, prostate, breast and brain cancers.  Pulmonary carcinoid tumors, which originate from neuroendocrine cells in the  lungs, are generally sporadic. However, in rare cases, pulmonary carcinoid tumors has been associated with certain hereditary conditions, such as MEN1. MEN1 is caused by genetic changes in the MEN1 gene. Many different types of tumors are associated with MEN1. The most frequent include tumors of the parathyroid glands, the pituitary gland, and the pancreas. The most common tumor types are parathyroid adenomas (which often present with hyperparathyroidism), prolactinomas or pituitary adenomas, a variety of endocrine tumors of the gastro-entero-pancreatic tract, carcinoid tumors, and adrenocortical tumors. These tumors can be either cancerous or noncancerous.  A detailed handout regarding HBOC, Howard syndrome, MEN1 and the information we discussed will be provided to Kitty via [employer] and can be found in the after visit summary. Topics included: inheritance pattern, cancer risks, cancer screening recommendations, and also risks, benefits and limitations of testing.  In looking at Kitty's personal and family history, it is possible that a cancer susceptibility gene is present due to her personal history of pancreatic cancer at age 57, her brother diagnosed with prostate cancer, another brother diagnosed with colon cancer, maternal aunt diagnosed with colon cancer, paternal aunt diagnosed with breast cancer, three paternal first cousins diagnosed with breast cancer, and a paternal first cousin diagnosed with pancreatic cancer.  Based on her personal and family history, Kitty meets current National Comprehensive Cancer Network (NCCN) criteria for genetic testing of pancreatic cancer susceptibility genes (CELIA, BRCA1, BRCA2, CDKN2A, EPCAM, MLH1, MSH2, MSH6, PALB2, STK11, and TP53).  Based on her personal and family history, Kitty meets current National Comprehensive Cancer Network (NCCN) criteria for genetic testing of high-penetrance breast cancer susceptibility genes (including BRCA1, BRCA2, CDH1, PALB2, PTEN, STK11,  and TP53).   We discussed that there are additional genes that could cause increased risk for pancreatic and breast cancer. As many of these genes present with overlapping features in a family and accurate cancer risk cannot always be established based upon the pedigree analysis alone, it would be reasonable for Kitty to consider panel genetic testing to analyze multiple genes at once.  In looking at Kitty's personal and family history, it is possible that a cancer susceptibility gene is present due to her personal history of a pulmonary carcinoid tumor.    Genetic testing for Kitty is indicated both for her own personal medical management, as well as to provide information about risks (and potential related health issues) for other family members, including her siblings and son. Therefore, it would be reasonable for Kitty to purse testing of Multiple endocrine neoplasia type 1 to better understand her risk of a hereditary cancer syndrome.  We reviewed genetic testing options for Kitty based on her personal and family history: a panel of genes associated with an increased risk for hereditary pancreatic and breast cancer and neuroendocrine tumors, or larger panel options to include genes associated with increased risk for multiple different cancer types. Kitty expressed interest in *** panel.   Kitty would like to submit a blood sample for her genetic testing. She will go to her nearest [facility] at her earliest convenience to get her blood drawn for her genetic testing.   Verbal consent was given over video and written on the consent form. Turnaround time is approximately 3-4 weeks once the lab receives the sample.  Medical Management: For Kitty, we reviewed that the information from genetic testing may determine:  additional cancer screening for which Kitty may qualify (i.e. mammogram and breast MRI, more frequent colonoscopies, more frequent dermatologic exams, etc.),  options for risk  reducing surgeries Kitty could consider (i.e. bilateral mastectomy, surgery to remove her ovaries and/or uterus, etc.),    and targeted chemotherapies if she were to develop certain cancers in the future (i.e. immunotherapy for individuals with Howard syndrome, PARP inhibitors, etc.).   These recommendations and possible targeted chemotherapies will be discussed in detail once genetic testing is completed.     Plan:  1) Today Kitty elected to proceed with ***.  2) This information should be available in 4-6*** weeks.  3) Kitty will return to clinic to discuss the results.    Kitty is 63 year old and is being evaluated via a billable video visit.    Time spent on video: 65 minutes    Hanna Otto MS, [facility]  Licensed, Certified Genetic Counselor  Phone: [phone]      Again, thank you for allowing me to participate in the care of your patient.        Sincerely,        Hanna Otto GC

## 2024-11-21 NOTE — PATIENT INSTRUCTIONS
Assessing Cancer Risk  Cancer is a common diagnosis which impacts many families.  Individuals may develop cancer due to environmental factors (such as exposures and lifestyle), aging, genetic predisposition, or a combination of these factors.      Only about 5-10% of cancers are thought to be due to an inherited cancer susceptibility gene.    These families often have:  Several people with the same or related types of cancer  Cancers diagnosed at a young age (before age 50)  Individuals with more than one primary cancer  Multiple generations of the family affected with cancer    Comprehensive Breast and Gynecologic Cancer Panel  We each inherit two copies of every gene in our bodies: one from our mother, and one from our father. Each gene has a specific job to do.  When a gene has a mistake or  mutation  in it, it does not work like it should.     Some people may be candidates for genetic testing of more than one gene.  For these families, genetic testing using a cancer panel may be offered. These panels will test different genes at once known to increase the risk for breast, ovarian, uterine, and/or other cancers.    This handout will review common hereditary breast and gynecologic cancer syndromes. The genes that will be discussed in this handout are: CELIA, BRCA1, BRCA2, BRIP1, CDH1, CHEK2, MLH1, MSH2, MSH6, PMS2, EPCAM, PTEN, PALB2, RAD51C, RAD51D, and TP53.    The purpose of this handout is to serve as a brief summary of the breast and gynecologic cancer risk genes that have published clinical management guidelines for individuals who are found to carry a mutation. Inheriting a mutation does not mean a person will develop cancer, but it does significantly increase their risk above the general population risk.     ______________________________________________________________________________    Hereditary Breast and Ovarian Cancer Syndrome (BRCA1 and BRCA2)  A single mutation in one of the copies of BRCA1 or  BRCA2 increases the risk for breast and ovarian cancer, among others.  The risk for pancreatic cancer and melanoma may also be slightly increased in some families.  The chart below shows the chance that someone with a BRCA mutation would develop cancer in his or her lifetime1,2,3,4.       Lifetime Cancer Risks    General Population BRCA1  BRCA2   Breast  12% >60% >60%   Ovarian  1-2% 39-58% 13-29%   Prostate 12% 7-26% 19-61%   Male Breast 0.1% 0.2-1.2% 1.8-7.1%   Pancreas 1-2% Up to 5% 5-10%     A person s ethnic background is also important to consider, as individuals of Ashkenazi Jehovah's witness ancestry have a higher chance of having a BRCA gene mutation.  There are three BRCA mutations that occur more frequently in this population.      Howard Syndrome (MLH1, MSH2, MSH6, PMS2, and EPCAM)  Currently five genes are known to cause Howard Syndrome: MLH1, MSH2, MSH6, PMS2, and EPCAM.  A single mutation in one of the Howard Syndrome genes increases the risk for colon, endometrial, ovarian, and stomach cancers.  Other cancers that occur less commonly in Howard Syndrome include urinary tract, skin, and brain cancers.  The chart below shows the chance that a person with Howard syndrome would develop cancer in his or her lifetime5.      Lifetime Cancer Risks    General Population Howard Syndrome   Colon 5% 10-61%   Endometrial 3% 13-57%   Ovarian 1-2% 1-38%   Stomach <1% 1-9%   *Cancer risk varies depending on Howard syndrome gene found      Cowden Syndrome (PTEN)  Cowden syndrome is a hereditary condition that increases the risk for breast, thyroid, endometrial, colon, and kidney cancer.  Cowden syndrome is caused by a mutation in the PTEN gene.  A single mutation in one of the copies of PTEN causes Cowden syndrome and increases cancer risk.  The chart below shows the chance that someone with a PTEN mutation would develop cancer in their lifetime6,7.  Other benign features seen in some individuals with Cowden syndrome include benign  skin lesions (facial papules, keratoses, lipomas), learning disability, autism, thyroid nodules, colon polyps, and larger head size.     Lifetime Cancer Risks    General Population Cowden   Breast 12% 40-60%*   Thyroid 1% Up to 38%   Renal 1-2% Up to 35%   Endometrial 3% Up to 28%   Colon 5% Up to 9%   Melanoma 2-3% Up to 6%   *Emerging data suggests the risk for breast cancer could be greater than 60%               Li-Fraumeni Syndrome (TP53)  Li-Fraumeni Syndrome (LFS) is a cancer predisposition syndrome caused by a mutation in the TP53 gene. A single mutation in one of the copies of TP53 increases the risk for multiple cancers. Individuals with LFS are at an increased risk for developing cancer at a young age. The lifetime risk for development of a LFS-associated cancer is 50% by age 30 and 90% by age 60.   Core Cancers: Sarcomas, Breast, Brain, Lung, Leukemias/Lymphomas, Adrenocortical carcinomas  Other Cancers: Gastrointestinal, Thyroid, Skin, Genitourinary       Hereditary Diffuse Gastric Cancer (CDH1)  Currently, one gene is known to cause hereditary diffuse gastric cancer (HDGC): CDH1.  Individuals with HDGC are at increased risk for diffuse gastric cancer and lobular breast cancer. Of people diagnosed with HDGC, 30-50% have a mutation in the CDH1 gene.  This suggests there are likely other genes that may cause HDGC that have not been identified yet.      Lifetime Cancer Risks    General Population HDGC   Diffuse Gastric  <1% ~80%   Breast 12% 41-60%       Additional Genes    CELIA  CELIA is a moderate-risk breast cancer gene. Women who have a mutation in CELIA can have between a 2-4 fold increased risk for breast cancer compared to the general population8. CELIA mutations have also been associated with increased risk for pancreatic cancer between 5-10%9. Individuals who inherit two CELIA mutations have a condition called ataxia-telangiectasia (AT).  This rare autosomal recessive condition affects the nervous system  and immune system, and is associated with progressive cerebellar ataxia beginning in childhood. Individuals with ataxia-telangiectasia often have a weakened immune system and have an increased risk for childhood cancers.    PALB2  Mutations in PALB2 have been shown to increase the risk of breast cancer up to 41-60% in some families; where individuals fall within this risk range is dependent upon family [id number]. PALB2 mutations have also been associated with increased risk for pancreatic cancer between 5-10%.  Individuals who inherit two PALB2 mutations--one from their mother and one from their father--have a condition called Fanconi Anemia.  This rare autosomal recessive condition is associated with short stature, developmental delay, bone marrow failure, and increased risk for childhood cancers.    CHEK2   CHEK2 is a moderate-risk breast cancer gene.  Women who have a mutation in CHEK2 have around a 2-4 fold increased risk for breast cancer compared to the general population, and this risk may be higher depending upon family history.11,12,13 The risk of colon cancer may be twice as high as the general population risk of colon cancer of 5%. Mutations in CHEK2 have also been shown to increase the risk of other cancers, including prostate, however these cancer risks are currently not well understood.    BRIP1, RAD51C and RAD51D  Mutations in RAD51C and RAD51D have been shown to increase the risk of ovarian cancer and breast cancer 14,. Mutations in BRIP1 have been shown to increase the risk of ovarian cancer and possibly female breast cancer 15 .       Lifetime Cancer Risk    General Population        BRIP1   RAD51C  RAD51D   Breast 12% Not well defined 20-40% 20-40%   Ovarian 1-2% 5-15% 10-15% 10-20%     ______________________________________________________________  Inheritance  All of the cancer syndromes reviewed above are inherited in an autosomal dominant pattern.  This means that if a parent has a mutation,  each of their children will have a 50% chance of inheriting that same mutation. Therefore, each child --male or female-- would have a 50% chance of being at increased risk for developing cancer.    Image obtained from Genetics Home Reference, 2013     Mutations in some genes can occur de dawn, which means that a person s mutation occurred for the first time in them and was not inherited from a parent.  Now that they have the mutation, however, it can be passed on to future generations.    Genetic Testing  Genetic testing involves a blood test and will look for any harmful mutations that are associated with increased cancer risk.  If possible, it is recommended that the person(s) who has had cancer be tested before other family members.  That person will give us the most useful information about whether or not a specific gene is associated with the cancer in the family.    Results  There are three possible results of genetic testing:  Positive--a harmful mutation was identified in one or more of the genes  Negative--no mutations were identified in any of the genes tested  Variant of unknown significance--a variation in one of the genes was identified, but it is unclear how this impacts cancer risk in the family    Advantages and Disadvantages   There are advantages and disadvantages to genetic testing.    Advantages  May clarify your cancer risk  Can help you make medical decisions  May explain the cancers in your family  May give useful information to your family members (if you share your results)    Disadvantages  Possible negative emotional impact of learning about inherited cancer risk  Uncertainty in interpreting a negative test result in some situations  Possible genetic discrimination concerns (see below)    Genetic Information Nondiscrimination Act (JESÚS)  The Genetic Information Nondiscrimination Act of 2008 (JESÚS) is a federal law that protects individuals from health insurance or employment discrimination  based on a genetic test result alone (with some exceptions, including employers with fewer than 15 employees, and ).  Although rare, JESÚS  does not cover discrimination protections in terms of life insurance, long term care, or disability insurances.  Visit the National Human Finsp[employer] Research [place] website to learn more.    Reducing Cancer Risk  All of the genes described in this handout have nationally recognized cancer screening guidelines that would be recommended for individuals who test positive.  In addition to increased cancer screening, surgeries may be offered or recommended to reduce cancer risk.  Recommendations are based upon an individual s genetic test result as well as their personal and family history of cancer.    Questions to Think About Regarding Genetic Testing:  What effect will the test result have on me and my relationship with my family members if I have an inherited gene mutation?  If I don t have a gene mutation?  Should I share my test results, and how will my family react to this news, which may also affect them?  Are my children ready to learn new information that may one day affect their own health?    Hereditary Cancer Resources    FORCE: Facing Our Risk of Cancer Empowered facingourrisk.org   Bright Pink bebrightpink.org   Li-Fraumeni Syndrome Association lfsassociation.org   PTEN World PTENworld.com   No stomach for cancer, Inc. nostomachforcancer.org   Stomach cancer relief network Scrnet.org   Collaborative Group of the Americas on Inherited Colorectal Cancer (CGA) cgaicc.com    Cancer Care cancercare.org   American Cancer Society (ACS) cancer.org   National Cancer [place] (NCI) cancer.gov     Please call us if you have any questions or concerns.   Cancer Risk Management Program [phone]ER ([phone])  Paras Cavazos, MS [facility] [place]  [phone]  Hanna Otto, MS, [facility] [place] [phone]  Princess Oliveira, MS, [facility] [place]  [phone]  Mami Rutledge, MS, [facility] [place]  [phone]  Tiny Padron,  MS, [facility] [place]  [phone]  Kristina Rico, MS, [facility] [place] [phone]  Leona Gil, MS, [facility] [place] [phone]    References  Randee Salmeron PDP, Chelsie S, Jama VALENCIA, Ronald JE, Ivory JL, Wiley N, Flaca H, Chris O, More A, Pasini B, Radihoward P, Manadrienne S, David DM, Vivar N, Jessica E, Ivette H, Lara E, Desean J, Gronzahra J, Vernon B, Tulinius H, Thorlacius S, Eerola H, Nevanlinna H, Candace K, Samuel OP. Average risks of breast and ovarian cancer associated with BRCA1 or BRCA2 mutations detected in case series unselected for family history: a combined analysis of 222 studies. Am J Hum Dominga. 2003;72:1117-30.  David N, Rosa Elena M, Nereida G.  BRCA1 and BRCA2 Hereditary Breast and Ovarian Cancer. Gene Reviews online. 2013.  Sammy YC, Tori S, Chadd G, Koroma S. Breast cancer risk among male BRCA1 and BRCA2 mutation carriers. J Natl Cancer Inst. 2007;99:1811-4.  Herman CALDWELL, Chloé I, Heladio J, Edith E, Sukhi ER, Marco Antonio F. Risk of breast cancer in male BRCA2 carriers. J Med Dominag. 2010;47:710-1.  National Comprehensive Cancer Network. Clinical practice guidelines in oncology, colorectal cancer screening. Available online (registration required). 2015.  Marbin MH, Curt J, Gregory J, Alicia MYERS, Amarilis MS, Eng C. Lifetime cancer risks in individuals with germline PTEN mutations. Clin Cancer Res. 2012;18:400-7.  Evelina R. Cowden Syndrome: A Critical Review of the Clinical Literature. J Dominga . 2009:18:13-27.  So BRUCE, Alexis WINTERS, Clau S, Shivani P, Nathen T, Deysi M, Ken B, Barak H, Alia R, Maggy K, Stephanie L, Herman CALDWELL, David WINTERS, Rubin DF, Raymond MR, The Breast Cancer Susceptibility Collaboration ([place]) & [place] WILSON. CELIA mutations that cause ataxia-telangiectasia are breast cancer susceptibility alleles. Nature Genetics. 2006;38:873-875  Darius N , Isabella Y, Jessie J, Raissa L, Charbel LEVI , Anju ML, Paul S, Elena AG, Alexander S, Gisela ML, Salome J , Taina R, Jaycee WING, Kishore  JR, Niki VE, Chantel M, Vomoniquestein B, Darrius N, Ion RH, Moe KW, and Augustus AP. CELIA mutations in patients with hereditary pancreatic cancer. Cancer Discover. 2012;2:41-46  Davis AGGARWAL., et al. Breast-Cancer Risk in Families with Mutations in PALB2. NEJM. 2014; 371(6):497-506.  CHEK2 Breast Cancer Case-Control Consortium. CHEK2*1100delC and susceptibility to breast cancer: A collaborative analysis involving 10,860 breast cancer cases and 9,065 controls from 10 studies. Am J Hum Dominga, 74 (2004), pp. 3846-0987  Vijaya T, Claudine S, Jose K, et al. Spectrum of Mutations in BRCA1, BRCA2, CHEK2, and TP53 in Families at High Risk of Breast Cancer. CHRISTA. 2006;295(12):2915-2736.   Paula C, Rosie D, Christy BRUCE, et al. Risk of breast cancer in women with a CHEK2 mutation with and without a family history of breast cancer. J Clin Oncol. 2011;29:8678-3111.  Song H, Marios E, Ramus SJ, et al. Contribution of germline mutations in the RAD51B, RAD51C, and RAD51D genes to ovarian cancer in the population. J Clin Oncol. 2015;33(26):7103-8537. Doi:10.1200/JCO.2015.61.2408.  Denis T, Robb DF, Evangelista P, et al. Mutations in BRIP1 confer high risk of ovarian cancer. Alis Dominga. 2011;43(11):9823-6570. doi:10.1038/ng.955.

## 2024-11-22 ENCOUNTER — TRANSFERRED RECORDS (OUTPATIENT)
Dept: HEALTH INFORMATION MANAGEMENT | Facility: CLINIC | Age: 64
End: 2024-11-22
Payer: COMMERCIAL

## 2024-11-22 LAB — RETINOPATHY: NEGATIVE

## 2024-12-13 SDOH — HEALTH STABILITY: PHYSICAL HEALTH: ON AVERAGE, HOW MANY MINUTES DO YOU ENGAGE IN EXERCISE AT THIS LEVEL?: 50 MIN

## 2024-12-13 SDOH — HEALTH STABILITY: PHYSICAL HEALTH: ON AVERAGE, HOW MANY DAYS PER WEEK DO YOU ENGAGE IN MODERATE TO STRENUOUS EXERCISE (LIKE A BRISK WALK)?: 7 DAYS

## 2024-12-13 ASSESSMENT — SOCIAL DETERMINANTS OF HEALTH (SDOH): HOW OFTEN DO YOU GET TOGETHER WITH FRIENDS OR RELATIVES?: ONCE A WEEK

## 2024-12-17 ENCOUNTER — OFFICE VISIT (OUTPATIENT)
Dept: FAMILY MEDICINE | Facility: CLINIC | Age: 64
End: 2024-12-17
Attending: NURSE PRACTITIONER
Payer: COMMERCIAL

## 2024-12-17 VITALS
TEMPERATURE: 98.1 F | HEIGHT: 66 IN | HEART RATE: 60 BPM | WEIGHT: 136 LBS | OXYGEN SATURATION: 97 % | BODY MASS INDEX: 21.86 KG/M2 | DIASTOLIC BLOOD PRESSURE: 55 MMHG | SYSTOLIC BLOOD PRESSURE: 105 MMHG | RESPIRATION RATE: 12 BRPM

## 2024-12-17 DIAGNOSIS — D69.6 THROMBOCYTOPENIA (H): ICD-10-CM

## 2024-12-17 DIAGNOSIS — G62.0 DRUG-INDUCED POLYNEUROPATHY (H): ICD-10-CM

## 2024-12-17 DIAGNOSIS — Z00.00 MEDICARE ANNUAL WELLNESS VISIT, SUBSEQUENT: Primary | ICD-10-CM

## 2024-12-17 DIAGNOSIS — C25.9 PANCREATIC ADENOCARCINOMA (H): ICD-10-CM

## 2024-12-17 DIAGNOSIS — R11.0 NAUSEA: ICD-10-CM

## 2024-12-17 DIAGNOSIS — E11.9 TYPE 2 DIABETES MELLITUS WITHOUT COMPLICATION, WITHOUT LONG-TERM CURRENT USE OF INSULIN (H): ICD-10-CM

## 2024-12-17 DIAGNOSIS — K76.0 FATTY LIVER: ICD-10-CM

## 2024-12-17 DIAGNOSIS — Z78.0 POST-MENOPAUSAL: ICD-10-CM

## 2024-12-17 LAB
ERYTHROCYTE [DISTWIDTH] IN BLOOD BY AUTOMATED COUNT: 13 % (ref 10–15)
EST. AVERAGE GLUCOSE BLD GHB EST-MCNC: 128 MG/DL
HBA1C MFR BLD: 6.1 % (ref 0–5.6)
HCT VFR BLD AUTO: 38.1 % (ref 35–47)
HGB BLD-MCNC: 12.8 G/DL (ref 11.7–15.7)
MCH RBC QN AUTO: 31.4 PG (ref 26.5–33)
MCHC RBC AUTO-ENTMCNC: 33.6 G/DL (ref 31.5–36.5)
MCV RBC AUTO: 93 FL (ref 78–100)
PLATELET # BLD AUTO: 406 10E3/UL (ref 150–450)
RBC # BLD AUTO: 4.08 10E6/UL (ref 3.8–5.2)
WBC # BLD AUTO: 8.9 10E3/UL (ref 4–11)

## 2024-12-17 PROCEDURE — 80061 LIPID PANEL: CPT | Performed by: NURSE PRACTITIONER

## 2024-12-17 PROCEDURE — 85027 COMPLETE CBC AUTOMATED: CPT | Performed by: NURSE PRACTITIONER

## 2024-12-17 PROCEDURE — 82043 UR ALBUMIN QUANTITATIVE: CPT | Performed by: NURSE PRACTITIONER

## 2024-12-17 PROCEDURE — 82570 ASSAY OF URINE CREATININE: CPT | Performed by: NURSE PRACTITIONER

## 2024-12-17 PROCEDURE — 83036 HEMOGLOBIN GLYCOSYLATED A1C: CPT | Performed by: NURSE PRACTITIONER

## 2024-12-17 PROCEDURE — 36415 COLL VENOUS BLD VENIPUNCTURE: CPT | Performed by: NURSE PRACTITIONER

## 2024-12-17 PROCEDURE — 80053 COMPREHEN METABOLIC PANEL: CPT | Performed by: NURSE PRACTITIONER

## 2024-12-17 RX ORDER — ONDANSETRON 4 MG/1
4 TABLET, FILM COATED ORAL EVERY 6 HOURS PRN
Qty: 20 TABLET | Refills: 1 | Status: SHIPPED | OUTPATIENT
Start: 2024-12-17

## 2024-12-17 NOTE — PROGRESS NOTES
Preventive Care Visit  [facility]  TYLER Prince CNP, Family Medicine  Dec 17, 2024  {Provider  Link to [facility] :193357}    Assessment & Plan     Type 2 diabetes mellitus without complication, without long-term current use of insulin (H)  ***  - HEMOGLOBIN A1C  - Lipid panel reflex to direct LDL Fasting  - Albumin Random Urine Quantitative with Creat Ratio  - HEMOGLOBIN A1C  - Lipid panel reflex to direct LDL Fasting  - Albumin Random Urine Quantitative with Creat Ratio    Fatty liver  ***  - Comprehensive metabolic panel (BMP + Alb, Alk Phos, ALT, AST, Total. Bili, TP)  - Comprehensive metabolic panel (BMP + Alb, Alk Phos, ALT, AST, Total. Bili, TP)    Pancreatic adenocarcinoma (H)  ***  - Comprehensive metabolic panel (BMP + Alb, Alk Phos, ALT, AST, Total. Bili, TP)  - Comprehensive metabolic panel (BMP + Alb, Alk Phos, ALT, AST, Total. Bili, TP)    Nausea  ***  - ondansetron (ZOFRAN) 4 MG tablet  Dispense: 20 tablet; Refill: 1    Post-menopausal  ***    Drug-induced polyneuropathy (H)  ***    Thrombocytopenia (H)  ***  - CBC with platelets  - CBC with platelets    Medicare annual wellness visit, subsequent  ***  - History of pancreatic cancer.  Stable.  No chronic kidney disease.   - Labs appear stable. VSS.  Her previous ASCVD D score was discussed today.  Guidelines regarding statin use was also discussed and she declined.   - Diabetes has been stable   - plts and wbc stable.       Patient has been advised of split billing requirements and indicates understanding: Yes        Counseling  Appropriate preventive services were addressed with this patient via screening, questionnaire, or discussion as appropriate for fall prevention, nutrition, physical activity, Tobacco-use cessation, social engagement, weight loss and cognition.  Checklist reviewing preventive services available has been given to the patient.  Reviewed patient's diet, addressing concerns and/or questions.    Information on urinary incontinence and treatment options given to patient.         Subjective 2  Kitty is a 64 year old, presenting for the following:  Wellness Visit        12/11/2023    10:10 AM   Additional Questions   Roomed by Hakeem FairROOMER if patient is in their first year of Medicare a vision screen is required click here to document the Vison screen and then refresh the note to pull in results  :532140}    -She overall has been doing well this year.  She has been following her oncologist on a regular basis.  Chronically, she can get nauseated about 1 hour after she eats.  There is no vomiting, but sometimes she will feel like she wants to vomit.  She has been chronically watching her diet and possible triggers, and really cannot link anything to her nausea.  She does not eat fatty foods.  She enjoys walking, and this will help her nausea.  Her weight has been stable.  She denied any fevers or night sweats.  She walks about 2 to 4 miles a day.  She did go to a survival cancer ship clinic at the [facility].  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  They also order her a as needed antibiotic in case she ever develops a fever or gets sick.  She can sometimes get constipated, or have diarrhea.  She has been noticing a constant ache to the right lower pelvic area.  She questions if related to her bowels, such as IBS.  She had a CT of abdomen and pelvis recently, diverticula noted, pelvic area appeared normal.  She is having no vaginal drainage or bleeding no history of hysterectomy.     HPI    {MA/LPN/RN Pre-Provider Visit Orders- hCG/UA/Strep (Optional):830737}  {SUPERLIST (Optional):305509}  {additonal problems for provider to add (Optional):203109}  Health Care Directive  Patient has a Health Care Directive on file  Advance care planning document is on file but is outdated.  Patient encouraged to update.      12/13/2024   General Health   How would you rate your  overall physical health? Good   Feel stress (tense, anxious, or unable to sleep) Only a little      (!) STRESS CONCERN      12/13/2024   Nutrition   Diet: Low fat/cholesterol    Diabetic    Carbohydrate counting       Multiple values from one day are sorted in reverse-chronological order         12/13/2024   Exercise   Days per week of moderate/strenous exercise 7 days   Average minutes spent exercising at this level 50 min            12/13/2024   Social Factors   Frequency of gathering with friends or relatives Once a week   Worry food won't last until get money to buy more No   Food not last or not have enough money for food? No   Do you have housing? (Housing is defined as stable permanent housing and does not include staying ouside in a car, in a tent, in an abandoned building, in an overnight shelter, or couch-surfing.) Yes   Are you worried about losing your housing? No   Lack of transportation? No   Unable to get utilities (heat,electricity)? No            12/13/2024   Fall Risk   Fallen 2 or more times in the past year? No     No    Trouble with walking or balance? No     No        Patient-reported    Multiple values from one day are sorted in reverse-chronological order          12/13/2024   Activities of Daily Living- Home Safety   Needs help with the following daily activites None of the above   Safety concerns in the home None of the above            12/13/2024   Dental   Dentist two times every year? Yes            12/13/2024   Hearing Screening   Hearing concerns? None of the above            12/13/2024   Driving Risk Screening   Patient/family members have concerns about driving No            12/13/2024   General Alertness/Fatigue Screening   Have you been more tired than usual lately? No            12/13/2024   Urinary Incontinence Screening   Bothered by leaking urine in past 6 months Yes            12/13/2024   TB Screening   Were you born outside of the [place]? No            Today's PHQ-2 Score:        2024    10:56 AM   PHQ-2 (  [employer])   Q1: Little interest or pleasure in doing things 0    Q2: Feeling down, depressed or hopeless 0    PHQ-2 Score 0    Q1: Little interest or pleasure in doing things Not at all   Q2: Feeling down, depressed or hopeless Not at all   PHQ-2 Score 0       Patient-reported           2024   Substance Use   Alcohol more than 3/day or more than 7/wk Not Applicable   Do you have a current opioid prescription? No   How severe/bad is pain from 1 to 10? 3/10   Do you use any other substances recreationally? No        Social History     Tobacco Use    Smoking status: Former     Current packs/day: 0.00     Average packs/day: 0.5 packs/day for 6.0 years (3.0 ttl pk-yrs)     Types: Cigarettes     Start date: 1975     Quit date: 1981     Years since quittin.9     Passive exposure: Past    Smokeless tobacco: Never   Vaping Use    Vaping status: Never Used   Substance Use Topics    Alcohol use: No     Alcohol/week: 21.0 standard drinks of alcohol     Types: 21 Standard drinks or equivalent per week     Comment: Now quit since Oct 31 2017.  Moderate drinker in past    Drug use: No     {Provider  If there are gaps in the social history shown above, please follow the link to update and then refresh the note Link to Social and Substance History :799307}      2024   LAST FHS-7 RESULTS   1st degree relative breast or ovarian cancer No   Any relative bilateral breast cancer Yes   Any male have breast cancer No   Any ONE woman have BOTH breast AND ovarian cancer No   Any woman with breast cancer before 50yrs Yes   2 or more relatives with breast AND/OR ovarian cancer Yes   2 or more relatives with breast AND/OR bowel cancer Yes        {If any of the questions to the FHS7 are answered yes, consider referral for genetic counseling.    Additional indications for genetic referral include personal history of breast or ovarian cancer, genetic mutation in 1st degree relative which  increases risk of breast cancer including BRCA1, BRCA2, CELIA, PALB 2, TP53, CHEK2, PTEN, CDH1, STK11 (per ACS) and/or 1st degree relative with history of pancreatic or high-risk prostate cancer (per NCCN):175597}   Mammogram Screening - Mammogram every 1-2 years updated in Health Maintenance based on mutual decision making      History of abnormal Pap smear: No - age 30- 64 PAP with HPV every 5 years recommended        Latest Ref Rng & Units 2021    11:52 AM 10/5/2018     9:44 AM 10/5/2018     9:20 AM   PAP / HPV   PAP  Negative for Intraepithelial Lesion or Malignancy (NILM)      PAP (Historical)   NIL     HPV 16 DNA Negative Negative   Negative    HPV 18 DNA Negative Negative   Negative    Other HR HPV Negative Negative   Negative      ASCVD Risk   The 10-year ASCVD risk score (Tyler LEE, et al., 2019) is: 6.9%    Values used to calculate the score:      Age: 64 years      Sex: Female      Is Non- : No      Diabetic: Yes      Tobacco smoker: No      Systolic Blood Pressure: 105 mmHg      Is BP treated: No      HDL Cholesterol: 77 mg/dL      Total Cholesterol: 186 mg/dL    Fracture Risk Assessment Tool  Link to Frax Calculator  Use the information below to complete the Frax calculator  : 1960  Sex: female  Weight (kg): 61.7 kg (actual weight)  Height (cm): 166.4 cm  Previous Fragility Fracture:  No  History of parent with fractured hip:  No  Current Smoking:  No  Patient has been on glucocorticoids for more than 3 months (5mg/day or more): No  Rheumatoid Arthritis on Problem List:  No  Secondary Osteoporosis on Problem List:  No  Consumes 3 or more units of alcohol per day: No  Femoral Neck BMD (g/cm2)  {Link to FRAX Calculated Score Flowsheet  After results are documented in flowsheet, refresh note to pull results into note :393832} {10-year probability of a hip fracture >= 3% or a major osteoporosis-related fracture >= 20% may indicate treatment:466396}     {Provider   REQUIRED FOR AWV Use the storyboard to review patient history, after sections have been marked as reviewed, refresh note to capture documentation:767272}  {Provider   REQUIRED AWV use this link to review and update sexual activity history  after section has been marked as reviewed, refresh note to capture documentation:250295}  Reviewed and updated as needed this visit by Provider                    Past Medical History:   Diagnosis Date    Arthritis Post chemo    Suspected    Depressive disorder     have had therapy    Diabetes (H)     History of blood transfusion     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 A few calcifications are noted along the lateral margin of the mass.    Pneumonia     2016    PONV (postoperative nausea and vomiting)      Past Surgical History:   Procedure Laterality Date    ABDOMEN SURGERY  1983    Ruptured tubal pregnancies, additional surgeries followed    BACK SURGERY  2004    L5, S1 discectomy    BIOPSY  2017, 2021    Pancreatic cyst, Lung    BREAST SURGERY  2003    Breast reduction    CHOLECYSTECTOMY  April 2018    Removed spleen and partial pancreas    COLONOSCOPY  2008    COLONOSCOPY N/A 12/11/2018    Procedure: colonoscopy;  Surgeon: Lobito Marte MD;  Location: [facility]    DAVINCI LOBECTOMY LUNG Left 07/28/2021    Procedure: Robot-assisted thoracoscopic left lower lobe wedge resection;  Surgeon: René Phillips MD;  Location: [facility]    ENDOSCOPIC RETROGRADE CHOLANGIOPANCREATOGRAM N/A 01/25/2018    Procedure: COMBINED ENDOSCOPIC RETROGRADE CHOLANGIOPANCREATOGRAPHY, PLACE TUBE/STENT;  Endoscopic retrograde cholangiopancreatogram with stent placement and cyst drainage bile ;  Surgeon: Vito Sanches MD;  Location: [facility]    ENDOSCOPIC ULTRASOUND LOWER GASTROINTESTIONAL TRACT (GI)  N/A 2018    Procedure: ENDOSCOPIC ULTRASOUND LOWER GASTROINTESTIONAL TRACT (GI);  Endoscopic Ultrasound with guided Cyst-Gastrostomy;  Surgeon: González Metz MD;  Location: [facility]    ENDOSCOPIC ULTRASOUND, ESOPHAGOSCOPY, GASTROSCOPY, DUODENOSCOPY (EGD), NECROSECTOMY N/A 2017    Procedure: ENDOSCOPIC ULTRASOUND, ESOPHAGOSCOPY, GASTROSCOPY, DUODENOSCOPY (EGD), NECROSECTOMY;  Esophagogastroduodenoscopy, with  Necrosectomy and stents replacement  ;  Surgeon: González Metz MD;  Location: [facility]    ENDOSCOPIC ULTRASOUND, ESOPHAGOSCOPY, GASTROSCOPY, DUODENOSCOPY (EGD), NECROSECTOMY N/A 2017    Procedure: ENDOSCOPIC ULTRASOUND, ESOPHAGOSCOPY, GASTROSCOPY, DUODENOSCOPY (EGD), NECROSECTOMY;  Esophagogastroduodenoscopy with Necrosectomy, Balloon Dilation, stent removal X3 and Cystgastrostomy stent Placement X2;  Surgeon: Guru Cecilia Staples MD;  Location: [facility]    ESOPHAGOSCOPY, GASTROSCOPY, DUODENOSCOPY (EGD), COMBINED N/A 2017    Procedure: COMBINED ENDOSCOPIC ULTRASOUND, ESOPHAGOSCOPY, GASTROSCOPY, DUODENOSCOPY (EGD), FINE NEEDLE ASPIRATE/BIOPSY;  Endoscopic Ultrasound with cystgastrostomy and fine needle aspiration ;  Surgeon: González Metz MD;  Location: [facility]    ESOPHAGOSCOPY, GASTROSCOPY, DUODENOSCOPY (EGD), COMBINED N/A 2018    Procedure: COMBINED ESOPHAGOSCOPY, GASTROSCOPY, DUODENOSCOPY (EGD);  EGD;  Surgeon: Gonzálze Metz MD;  Location: [facility]    ESOPHAGOSCOPY, GASTROSCOPY, DUODENOSCOPY (EGD), COMBINED N/A 2018    Procedure: COMBINED ESOPHAGOSCOPY, GASTROSCOPY, DUODENOSCOPY (EGD), REMOVE FOREIGN BODY;;  Surgeon: González Metz MD;  Location: [facility]    ESOPHAGOSCOPY, GASTROSCOPY, DUODENOSCOPY (EGD), COMBINED N/A 2023    Procedure: ESOPHAGOGASTRODUODENOSCOPY, WITH BIOPSY;  Surgeon: Diaz Romeo MD;  Location: [facility]    GYN SURGERY      Multiple ectopic pregnancies, reconnect,      INSERT PORT VASCULAR  ACCESS Right 2018    Procedure: INSERT PORT VASCULAR ACCESS;  Chest Port Placement - right;  Surgeon: Chanda Lawson PA-C;  [facility]    IR PORT REMOVAL RIGHT  2019    LAPAROSCOPY DIAGNOSTIC (GENERAL) N/A 2018    Procedure: LAPAROSCOPY DIAGNOSTIC (GENERAL);  Diagnostic Laparoscopy; Open Distal Pancreatectomy And Splenectomy, splenic flexure mobilization, cholecystectomy, intraoperative ultrasound;  Surgeon: Ja Byrd MD;  Location: [facility]    MAMMOPLASTY REDUCTION Bilateral 2006    PANCREATECTOMY, SPLENECTOMY N/A 2018    Procedure: PANCREATECTOMY, SPLENECTOMY;;  Surgeon: Ja Byrd MD;  Location: [facility]    [facility] LAMNOTMY INCL W/DCMPRSN NRV ROOT 1 INTRSPC LUMBR      Description: Hemilaminectomy With Disc Removal One Lumbar Interspace;  Recorded: 2008;  Comments: Right L5-S1 with resultant loss of right patellar and achilles reflex    REMOVE PORT VASCULAR ACCESS Right 2019    Procedure: Right Port Removal;  Surgeon: Juan J Donaldson PA-C;  Location: [facility]    ZZC  DELIVERY ONLY      Description:  Section;  Recorded: 12/10/2009;     Lab work is in process  Labs reviewed in EPIC  Current providers sharing in care for this patient include:  Patient Care Team:  Kathy Jurado APRN CNP as PCP - Gagan Kent, RN as Clinic Care Coordinator  Vito Sanches MD as MD (Gastroenterology)  Jovany Monk MD as MD (Oncology)  Es Gallo MD as MD (INTERNAL MEDICINE - ENDOCRINOLOGY, DIABETES & METABOLISM)  Oxana Knox, RN as Registered Nurse (Nurse)  Kj Orellana, BRENDAN as Specialty Care Coordinator (Hematology & Oncology)  Enrique Ward MD as MD (Otolaryngology)  Romero Winston DO as MD (Gastroenterology)  Bret Coughlin MD as MD (Otolaryngology)  Romero Winston DO as Assigned Gastroenterology Provider  Romero Winston DO as Physician (Gastroenterology)  Kathy Jurado APRN CNP as Assigned PCP  Brian  "April Villatoro PA-C as Assigned Cancer Care Provider    The following health maintenance items are reviewed in Epic and correct as of today:  Health Maintenance   Topic Date Due    DIABETIC FOOT EXAM  Never done    MENINGITIS IMMUNIZATION (2 - Risk 2-dose series) 05/28/2018    A1C  06/11/2024    COVID-19 Vaccine (7 - 2024-25 season) 09/01/2024    BMP  12/11/2024    LIPID  12/11/2024    MICROALBUMIN  12/11/2024    ANNUAL REVIEW OF HM ORDERS  12/11/2024    MEDICARE ANNUAL WELLNESS VISIT  12/11/2024    EYE EXAM  11/22/2025    FALL RISK ASSESSMENT  12/17/2025    MAMMO SCREENING  07/29/2026    COLORECTAL CANCER SCREENING  12/11/2028    ADVANCE CARE PLANNING  12/12/2028    DTAP/TDAP/TD IMMUNIZATION (3 - Td or Tdap) 10/24/2029    DEXA  01/18/2039    HEPATITIS C SCREENING  Completed    PHQ-2 (once per calendar year)  Completed    INFLUENZA VACCINE  Completed    Pneumococcal Vaccine: 65+ Years  Completed    ZOSTER IMMUNIZATION  Completed    RSV VACCINE  Completed    HPV IMMUNIZATION  Aged Out    RSV MONOCLONAL ANTIBODY  Aged Out    PAP  Discontinued         Review of Systems  Constitutional, HEENT, cardiovascular, pulmonary, gi and gu systems are negative, except as otherwise noted.     Objective    Exam  /55   Pulse 60   Temp 98.1  F (36.7  C) (Temporal)   Resp 12   Ht 1.664 m (5' 5.5\")   Wt 61.7 kg (136 lb)   SpO2 97%   BMI 22.29 kg/m     Estimated body mass index is 22.29 kg/m  as calculated from the following:    Height as of this encounter: 1.664 m (5' 5.5\").    Weight as of this encounter: 61.7 kg (136 lb).    Physical Exam  GENERAL: alert and no distress  EYES: Eyes grossly normal to inspection, PERRL and conjunctivae and sclerae normal  HENT: ear canals and TM's normal, nose and mouth without ulcers or lesions  NECK: no adenopathy, no asymmetry, masses, or scars  RESP: lungs clear to auscultation - no rales, rhonchi or wheezes  CV: regular rate and rhythm, normal S1 S2, no S3 or S4, no murmur, click or rub, " no peripheral edema  ABDOMEN: soft, nontender, no hepatosplenomegaly, no masses and bowel sounds normal  MS: no gross musculoskeletal defects noted, no edema  SKIN: no suspicious lesions or rashes  NEURO: Normal strength and tone, mentation intact and speech normal  PSYCH: mentation appears normal, affect normal/bright  LYMPH: no cervical, supraclavicular, axillary, or inguinal adenopathy        12/17/2024   Mini Cog   Clock Draw Score 2 Normal   3 Item Recall 3 objects recalled   Mini Cog Total Score 5        {A Mini-Cog total score of 0-2 suggests the possibility of dementia, score of 3-5 suggests no dementia:884644}         Signed Electronically by: TYLER Prince CNP  {Email feedback regarding this note to [email or web address]   :[id number]}

## 2024-12-18 LAB
ALBUMIN SERPL BCG-MCNC: 4.4 G/DL (ref 3.5–5.2)
ALP SERPL-CCNC: 78 U/L (ref 40–150)
ALT SERPL W P-5'-P-CCNC: 18 U/L (ref 0–50)
ANION GAP SERPL CALCULATED.3IONS-SCNC: 10 MMOL/L (ref 7–15)
AST SERPL W P-5'-P-CCNC: 27 U/L (ref 0–45)
BILIRUB SERPL-MCNC: 0.6 MG/DL
BUN SERPL-MCNC: 16.4 MG/DL (ref 8–23)
CALCIUM SERPL-MCNC: 9.8 MG/DL (ref 8.8–10.4)
CHLORIDE SERPL-SCNC: 100 MMOL/L (ref 98–107)
CHOLEST SERPL-MCNC: 174 MG/DL
CREAT SERPL-MCNC: 0.68 MG/DL (ref 0.51–0.95)
CREAT UR-MCNC: 44.1 MG/DL
EGFRCR SERPLBLD CKD-EPI 2021: >90 ML/MIN/1.73M2
FASTING STATUS PATIENT QL REPORTED: ABNORMAL
FASTING STATUS PATIENT QL REPORTED: NORMAL
GLUCOSE SERPL-MCNC: 104 MG/DL (ref 70–99)
HCO3 SERPL-SCNC: 28 MMOL/L (ref 22–29)
HDLC SERPL-MCNC: 71 MG/DL
LDLC SERPL CALC-MCNC: 83 MG/DL
MICROALBUMIN UR-MCNC: <12 MG/L
MICROALBUMIN/CREAT UR: NORMAL MG/G{CREAT}
NONHDLC SERPL-MCNC: 103 MG/DL
POTASSIUM SERPL-SCNC: 4.3 MMOL/L (ref 3.4–5.3)
PROT SERPL-MCNC: 7.7 G/DL (ref 6.4–8.3)
SODIUM SERPL-SCNC: 138 MMOL/L (ref 135–145)
TRIGL SERPL-MCNC: 100 MG/DL

## 2024-12-20 ENCOUNTER — MYC REFILL (OUTPATIENT)
Dept: FAMILY MEDICINE | Facility: CLINIC | Age: 64
End: 2024-12-20
Payer: COMMERCIAL

## 2024-12-20 DIAGNOSIS — G62.0 DRUG-INDUCED POLYNEUROPATHY (H): ICD-10-CM

## 2024-12-20 DIAGNOSIS — E11.9 TYPE 2 DIABETES MELLITUS WITHOUT COMPLICATION, WITHOUT LONG-TERM CURRENT USE OF INSULIN (H): ICD-10-CM

## 2024-12-23 RX ORDER — METFORMIN HYDROCHLORIDE 500 MG/1
500 TABLET, EXTENDED RELEASE ORAL 2 TIMES DAILY WITH MEALS
Qty: 180 TABLET | Refills: 0 | Status: SHIPPED | OUTPATIENT
Start: 2024-12-23

## 2024-12-26 RX ORDER — GABAPENTIN 100 MG/1
200 CAPSULE ORAL 2 TIMES DAILY
Qty: 360 CAPSULE | Refills: 0 | Status: SHIPPED | OUTPATIENT
Start: 2024-12-26

## 2024-12-31 ENCOUNTER — LAB (OUTPATIENT)
Dept: LAB | Facility: CLINIC | Age: 64
End: 2024-12-31
Payer: COMMERCIAL

## 2024-12-31 DIAGNOSIS — Z80.0 FAMILY HISTORY OF COLON CANCER: ICD-10-CM

## 2024-12-31 DIAGNOSIS — D3A.090 BENIGN CARCINOID TUMOR OF LUNG (H): ICD-10-CM

## 2024-12-31 DIAGNOSIS — C25.7 MALIGNANT NEOPLASM OF OTHER PARTS OF PANCREAS (H): ICD-10-CM

## 2024-12-31 DIAGNOSIS — Z80.0 FAMILY HISTORY OF PANCREATIC CANCER: ICD-10-CM

## 2024-12-31 DIAGNOSIS — C25.7 MALIGNANT NEOPLASM OF OTHER PARTS OF PANCREAS (H): Primary | ICD-10-CM

## 2024-12-31 DIAGNOSIS — Z80.3 FAMILY HISTORY OF MALIGNANT NEOPLASM OF BREAST: ICD-10-CM

## 2024-12-31 DIAGNOSIS — Z80.42 FAMILY HISTORY OF PROSTATE CANCER: ICD-10-CM

## 2025-01-02 NOTE — PROGRESS NOTES
"CANCER SURVIVORSHIP VISIT- telephone visit to discuss creon dosing  Jan 16, 2025   Care Team:    Care Team   Primary Care Provider Kathy Jurado   Surgeon  Ja Byrd MD Bhargava, Amit, MD    Medical Oncologist  Jovany Monk MD       REASON FOR VISIT: survivorship visit for history of pancreatic cancer    CANCER HISTORY AND TREATMENT:    History of stage IB pancreatic cancer (tail) diagnosed in 2017  *Diagnosis:  11/29/2017. Presented with acute pancreatitis. 57 years old. Clinical stage IB (T2, N0, MX-small pulmonary nodules).   *Neoadjuvant chemotherapy: 4 cycles of FOLFIRINOX. She had acute pancreatitis after cycle one and had a cyst gastrostomy. Also had neutropenia and chemo was delayed a week and neulasta was added on to cycle 2. Cycle 3 was dose reduced due to neuropathy.   *Surgery: 04/17/2018 Laparoscopic distal pancreatectomy, omental resection, cholecystectomy, and splenectomy.  Complete pathologic response.   *Adjuvant Chemotherapy: 4 cycles of gemcitabine/capecitabine. Cycle 3 was dose reduced due to mucositis.   *Genetic testing: none  *Other information: 07/28/2021 wedge resection for new left lower lung nodule- carcinoid tumor.     INTERVAL HISTORY:      We are meeting at an earlier interval to review her Creon. Cancer survivors at her support group were talking about eating burgers and ice cream and if she eats these things she feels \"crummy.\" Since her surgery she has had abdominal pain, bloating, and diarrhea most mornings. She thought it was normal but then increased her creon from 4 to 5 tabs (from 96,000 U to 120,000 U) for meals and from 2 to 3 for snacks (from 48,000 U to 72,000 U) with good improvement of symptoms. She also starting tracking fat grams since we had a mychart exchange 2 weeks ago. She would like to increase her dose without having to take more pills. She is applying for free prescription assistance with [employer] and need to have a form completed so wanted to get the dose " sorted out before doing this.        Past Medical History:   Diagnosis Date    Arthritis Post chemo    Suspected    Depressive disorder     have had therapy    Diabetes (H)     History of blood transfusion     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 A few calcifications are noted along the lateral margin of the mass.    Pneumonia     2016    PONV (postoperative nausea and vomiting)    Pre-diabetes     Current Outpatient Medications   Medication Sig Dispense Refill    acetaminophen (TYLENOL) 500 MG tablet Take 2 tablets (1,000 mg) by mouth every 8 hours 100 tablet 1    alcohol swab prep pads Use to swab area of injection/avel as directed. 100 each 3    amoxicillin-clavulanate (AUGMENTIN) 875-125 MG tablet If fever of 100.4 or higher, take 1 tablet and seek medical care immediately (Patient not taking: Reported on 12/17/2024) 1 tablet 0    blood glucose (NO BRAND SPECIFIED) test strip Use to test blood sugar 2 times daily or as directed. To accompany: Blood Glucose Monitor Brands: per insurance. 100 strip 6    blood glucose (ONETOUCH VERIO IQ) test strip Use to test blood sugar 4 times daily or as directed. 400 each 3    blood glucose calibration (NO BRAND SPECIFIED) solution To accompany: Blood Glucose Monitor Brands: per insurance. 1 each 3    blood glucose monitoring (NO BRAND SPECIFIED) meter device kit Use to test blood sugar 2 times daily or as directed. Preferred blood glucose meter OR supplies to accompany: Blood Glucose Monitor Brands: per insurance. 1 kit 0    clobetasol (TEMOVATE) 0.05 % GEL topical gel Apply topically 2 times daily 30 g 1    clobetasol (TEMOVATE) 0.05 % GEL topical gel Apply topically 2 times daily 15 g 1    gabapentin (NEURONTIN) 100 MG capsule Take 2 capsules (200 mg) by mouth 2 times  daily. 360 capsule 0    lipase-protease-amylase (CREON) 80585-63827-440844 units CPEP per EC capsule TAKE 4 CAPSULES WITH MEALS AND 2 CAPSULES WITH SNACKS, UP TO 16 CAPSULES PER DAY 1440 capsule 4    metFORMIN (GLUCOPHAGE XR) 500 MG 24 hr tablet Take 1 tablet (500 mg) by mouth 2 times daily (with meals). 180 tablet 0    ondansetron (ZOFRAN) 4 MG tablet Take 1 tablet (4 mg) by mouth every 6 hours as needed for nausea. 20 tablet 1    OneTouch Delica Lancets 33G MISC 4 lancets daily 150 each 3    thin (NO BRAND SPECIFIED) lancets Use with lanceting device. To accompany: Blood Glucose Monitor Brands: per insurance. 100 each 6     No current facility-administered medications for this visit.     Facility-Administered Medications Ordered in Other Visits   Medication Dose Route Frequency Provider Last Rate Last Admin    heparin 100 UNIT/ML injection 5 mL  5 mL Intracatheter Once Art Guevara MD             No Known Allergies      Family History   Problem Relation Age of Onset    Heart Disease Father     Hyperlipidemia Father     Coronary Artery Disease Father         Quad Bypass,     Heart Disease Brother     Diabetes Mother     Hypertension Mother     Obesity Mother     Depression Mother     Diabetes Maternal Grandfather     Hypertension Maternal Grandfather     Obesity Maternal Grandfather     Diabetes Sister     Hypertension Sister     Depression Sister     Anxiety Disorder Sister     Obesity Sister     Hyperlipidemia Sister     Mental Illness Sister         Schizophrenia    Hypertension Brother     Hyperlipidemia Brother     Heart Disease Brother     Coronary Artery Disease Brother         Bypass, multiple stents, ongoing    Prostate Cancer Brother         Stage 4 - removed    Breast Cancer Cousin     Breast Cancer Cousin     Breast Cancer Cousin     Colon Cancer Other     Other Cancer Other         Mesothelioma    Depression Sister     Anxiety Disorder Sister     Obesity Sister     Anxiety Disorder  Brother     Cancer Brother 64        colon cancer    Colon Cancer Brother         Metastasized Stage 4 -  2020    Anxiety Disorder Son     Depression Son     Mental Illness Sister         Schizophrenia    Hypertension Sister     Obesity Maternal Grandmother     Obesity Sister     Hypertension Sister     Thyroid Disease Sister     Diabetes Nephew     Depression Nephew     Mental Illness Nephew         Ptsd    Hypertension Brother     Diabetes Other     Other Cancer Cousin         Pancreatic    Depression Niece     Substance Abuse Sister         Alcohol    Substance Abuse Brother         Alcohol    Coronary Artery Disease Paternal Grandfather             Breast Cancer Other         Aunt        Social history:  Living situation: , lives in [place].   Occupation: Retired   Tobacco use:  Tobacco Use      Smoking status: Former        Packs/day: 0.00        Years: 0.5 packs/day for 6.0 years (3.0 ttl pk-yrs)        Types: Cigarettes        Start date: 1975        Quit date: 1981        Years since quittin.0        Passive exposure: Past      Smokeless tobacco: Never  ETOH use: 0  Frequency of exercise: Daily  Duration of exercise: walking 1 hr  Diet:     Telephone physical exam  Voice is clear and strong. No audible stridor, wheezing, or respiratory distress. The remainder of PE was deferred due to PHE.      The rest of a comprehensive physical examination is deferred due to PHE (public health emergency) video restrictions    IMPRESSION/PLAN:    Pancreatic insufficiency due to pancreatectomy  She is currently taking 5 capsules of creon per meal (120,000 units of lipase) and 3 capsules for snack is (72,000 units of lipase). I suggest we switch from the 24,000 lipase capsules to 36,000 and take 4 capsules which is 144,000 mg of lipase per meal and 2 capsules which is 72,000 per snack which is within the maximum recommended daily dose of 600,000 U. We discussed that if she  thinks she would need a higher dose that I would refer her to nutrition to help guide dosing. She will send me the manufacture paperwork to sign and I will see her back in June as previously scheduled.     Note since last visit she has seen Dr. Acuña, continued annual CT, and completed MenACWY series and should consider booster every 5 years.     April Torres, PhD, MPAS, PA-C  [facility] Survivorship [facility]

## 2025-01-06 PROCEDURE — 81162 BRCA1&2 GEN FULL SEQ DUP/DEL: CPT

## 2025-01-16 ENCOUNTER — VIRTUAL VISIT (OUTPATIENT)
Dept: ONCOLOGY | Facility: CLINIC | Age: 65
End: 2025-01-16
Attending: PHYSICIAN ASSISTANT
Payer: COMMERCIAL

## 2025-01-16 ENCOUNTER — MYC MEDICAL ADVICE (OUTPATIENT)
Dept: ONCOLOGY | Facility: CLINIC | Age: 65
End: 2025-01-16

## 2025-01-16 VITALS — BODY MASS INDEX: 22.49 KG/M2 | WEIGHT: 135 LBS | HEIGHT: 65 IN

## 2025-01-16 DIAGNOSIS — K86.89 PANCREATIC INSUFFICIENCY: Primary | ICD-10-CM

## 2025-01-16 DIAGNOSIS — C25.0 MALIGNANT NEOPLASM OF HEAD OF PANCREAS (H): ICD-10-CM

## 2025-01-16 ASSESSMENT — PAIN SCALES - GENERAL: PAINLEVEL_OUTOF10: NO PAIN (0)

## 2025-01-16 NOTE — NURSING NOTE
Current patient location: [address] [place] [id number]    Is the patient currently in the state of [place]? YES    Visit mode: TELEPHONE    If the visit is dropped, the patient can be reconnected by:TELEPHONE VISIT: Phone number:   Telephone Information:   Mobile [phone]       Will anyone else be joining the visit? NO  (If patient encounters technical issues they should call [phone]    Are changes needed to the allergy or medication list? Pt stated no changes to allergies and Pt stated no med changes    Are refills needed on medications prescribed by this physician? YES    Rooming Documentation:  Unable to complete questionnaire(s) due to time    Reason for visit: MICHELINE MONTE

## 2025-01-16 NOTE — PROGRESS NOTES
Virtual Visit Details    Type of service:  Telephone Visit   Phone call duration: 22 minutes   Originating Location (pt. Location): Home    Distant Location (provider location):  On-site  Telephone visit completed due to the patient did not consent to a video visit.

## 2025-01-16 NOTE — PATIENT INSTRUCTIONS
Kitty- please send me the paperwork to fill out and sign to [employer] or my email at [email or web address]. Please note that [employer] is preferred and more secure.     Here is the plan:  She is currently taking 5 capsules of creon per meal (120,000 units of lipase) and 3 capsules for snack is (72,000 units of lipase). I suggest we switch from the 24,000 lipase capsules to 36,000 and take 4 capsules which is 144,000 mg of lipase per meal and 2 capsules which is 72,000 per snack which is within the maximum recommended daily dose of 600,000 U. We discussed that if she thinks she would need a higher dose that I would refer her to nutrition to help guide dosing. She will send me the manufacture paperwork to sign and I will see her back in June as previously scheduled.

## 2025-01-16 NOTE — LETTER
"1/16/2025      Kitty Bangura  [address] [place] [address]      Dear Colleague,    Thank you for referring your patient, Kitty Bangura, to the [facility]. Please see a copy of my visit note below.    CANCER SURVIVORSHIP VISIT- telephone visit to discuss creon dosing  Jan 16, 2025   Care Team:    Care Team   Primary Care Provider Kathy Jurado   Surgeon  Ja Byrd MD Bhargava, Amit, MD    Medical Oncologist  Jovany Monk MD       REASON FOR VISIT: survivorship visit for history of pancreatic cancer    CANCER HISTORY AND TREATMENT:    History of stage IB pancreatic cancer (tail) diagnosed in 2017  *Diagnosis:  11/29/2017. Presented with acute pancreatitis. 57 years old. Clinical stage IB (T2, N0, MX-small pulmonary nodules).   *Neoadjuvant chemotherapy: 4 cycles of FOLFIRINOX. She had acute pancreatitis after cycle one and had a cyst gastrostomy. Also had neutropenia and chemo was delayed a week and neulasta was added on to cycle 2. Cycle 3 was dose reduced due to neuropathy.   *Surgery: 04/17/2018 Laparoscopic distal pancreatectomy, omental resection, cholecystectomy, and splenectomy.  Complete pathologic response.   *Adjuvant Chemotherapy: 4 cycles of gemcitabine/capecitabine. Cycle 3 was dose reduced due to mucositis.   *Genetic testing: none  *Other information: 07/28/2021 wedge resection for new left lower lung nodule- carcinoid tumor.     INTERVAL HISTORY:      We are meeting at an earlier interval to review her Creon. Cancer survivors at her support group were talking about eating burgers and ice cream and if she eats these things she feels \"crummy.\" Since her surgery she has had abdominal pain, bloating, and diarrhea most mornings. She thought it was normal but then increased her creon from 4 to 5 tabs (from 96,000 U to 120,000 U) for meals and from 2 to 3 for snacks (from 48,000 U to 72,000 U) with good improvement of symptoms. She also starting tracking " fat grams since we had a mychart exchange 2 weeks ago. She would like to increase her dose without having to take more pills. She is applying for free prescription assistance with [employer] and need to have a form completed so wanted to get the dose sorted out before doing this.        Past Medical History:   Diagnosis Date     Arthritis Post chemo    Suspected     Depressive disorder     have had therapy     Diabetes (H)      History of blood transfusion      History of total splenectomy 04/17/2018     Necrotizing pancreatitis      Necrotizing pancreatitis 12/09/2017     Pancreatic cancer (H)      Pancreatic mass 11/01/2017    On CT abd/pelvis: 3 cm heterogeneous mass within the tail of the pancreas. A few calcifications are noted along the lateral margin of the mass.     Pneumonia     2016     PONV (postoperative nausea and vomiting)    Pre-diabetes     Current Outpatient Medications   Medication Sig Dispense Refill     acetaminophen (TYLENOL) 500 MG tablet Take 2 tablets (1,000 mg) by mouth every 8 hours 100 tablet 1     alcohol swab prep pads Use to swab area of injection/avel as directed. 100 each 3     amoxicillin-clavulanate (AUGMENTIN) 875-125 MG tablet If fever of 100.4 or higher, take 1 tablet and seek medical care immediately (Patient not taking: Reported on 12/17/2024) 1 tablet 0     blood glucose (NO BRAND SPECIFIED) test strip Use to test blood sugar 2 times daily or as directed. To accompany: Blood Glucose Monitor Brands: per insurance. 100 strip 6     blood glucose (ONETOUCH VERIO IQ) test strip Use to test blood sugar 4 times daily or as directed. 400 each 3     blood glucose calibration (NO BRAND SPECIFIED) solution To accompany: Blood Glucose Monitor Brands: per insurance. 1 each 3     blood glucose monitoring (NO BRAND SPECIFIED) meter device kit Use to test blood sugar 2 times daily or as directed. Preferred blood glucose meter OR supplies to accompany: Blood Glucose Monitor Brands: per insurance.  1 kit 0     clobetasol (TEMOVATE) 0.05 % GEL topical gel Apply topically 2 times daily 30 g 1     clobetasol (TEMOVATE) 0.05 % GEL topical gel Apply topically 2 times daily 15 g 1     gabapentin (NEURONTIN) 100 MG capsule Take 2 capsules (200 mg) by mouth 2 times daily. 360 capsule 0     lipase-protease-amylase (CREON) 81729-99103-877367 units CPEP per EC capsule TAKE 4 CAPSULES WITH MEALS AND 2 CAPSULES WITH SNACKS, UP TO 16 CAPSULES PER DAY 1440 capsule 4     metFORMIN (GLUCOPHAGE XR) 500 MG 24 hr tablet Take 1 tablet (500 mg) by mouth 2 times daily (with meals). 180 tablet 0     ondansetron (ZOFRAN) 4 MG tablet Take 1 tablet (4 mg) by mouth every 6 hours as needed for nausea. 20 tablet 1     OneTouch Delica Lancets 33G MISC 4 lancets daily 150 each 3     thin (NO BRAND SPECIFIED) lancets Use with lanceting device. To accompany: Blood Glucose Monitor Brands: per insurance. 100 each 6     No current facility-administered medications for this visit.     Facility-Administered Medications Ordered in Other Visits   Medication Dose Route Frequency Provider Last Rate Last Admin     heparin 100 UNIT/ML injection 5 mL  5 mL Intracatheter Once Art Guevara MD             No Known Allergies      Family History   Problem Relation Age of Onset     Heart Disease Father      Hyperlipidemia Father      Coronary Artery Disease Father         Quad Bypass,      Heart Disease Brother      Diabetes Mother      Hypertension Mother      Obesity Mother      Depression Mother      Diabetes Maternal Grandfather      Hypertension Maternal Grandfather      Obesity Maternal Grandfather      Diabetes Sister      Hypertension Sister      Depression Sister      Anxiety Disorder Sister      Obesity Sister      Hyperlipidemia Sister      Mental Illness Sister         Schizophrenia     Hypertension Brother      Hyperlipidemia Brother      Heart Disease Brother      Coronary Artery Disease Brother         Bypass, multiple stents,  ongoing     Prostate Cancer Brother         Stage 4 - removed     Breast Cancer Cousin      Breast Cancer Cousin      Breast Cancer Cousin      Colon Cancer Other      Other Cancer Other         Mesothelioma     Depression Sister      Anxiety Disorder Sister      Obesity Sister      Anxiety Disorder Brother      Cancer Brother 64        colon cancer     Colon Cancer Brother         Metastasized Stage 4 -  2020     Anxiety Disorder Son      Depression Son      Mental Illness Sister         Schizophrenia     Hypertension Sister      Obesity Maternal Grandmother      Obesity Sister      Hypertension Sister      Thyroid Disease Sister      Diabetes Nephew      Depression Nephew      Mental Illness Nephew         Ptsd     Hypertension Brother      Diabetes Other      Other Cancer Cousin         Pancreatic     Depression Niece      Substance Abuse Sister         Alcohol     Substance Abuse Brother         Alcohol     Coronary Artery Disease Paternal Grandfather              Breast Cancer Other         Aunt        Social history:  Living situation: , lives in [place].   Occupation: Retired   Tobacco use:  Tobacco Use      Smoking status: Former        Packs/day: 0.00        Years: 0.5 packs/day for 6.0 years (3.0 ttl pk-yrs)        Types: Cigarettes        Start date: 1975        Quit date: 1981        Years since quittin.0        Passive exposure: Past      Smokeless tobacco: Never  ETOH use: 0  Frequency of exercise: Daily  Duration of exercise: walking 1 hr  Diet:     Telephone physical exam  Voice is clear and strong. No audible stridor, wheezing, or respiratory distress. The remainder of PE was deferred due to PHE.      The rest of a comprehensive physical examination is deferred due to PHE (public health emergency) video restrictions    IMPRESSION/PLAN:    Pancreatic insufficiency due to pancreatectomy  She is currently taking 5 capsules of creon per meal (120,000 units  of lipase) and 3 capsules for snack is (72,000 units of lipase). I suggest we switch from the 24,000 lipase capsules to 36,000 and take 4 capsules which is 144,000 mg of lipase per meal and 2 capsules which is 72,000 per snack which is within the maximum recommended daily dose of 600,000 U. We discussed that if she thinks she would need a higher dose that I would refer her to nutrition to help guide dosing. She will send me the manufacture paperwork to sign and I will see her back in June as previously scheduled.     Note since last visit she has seen Dr. Acuña, continued annual CT, and completed MenACWY series and should consider booster every 5 years.     April Torres, PhD, MPAS, PA-C  [facility] Cancer Survivorship [facility]      Virtual Visit Details    Type of service:  Telephone Visit   Phone call duration: 22 minutes   Originating Location (pt. Location): Home    Distant Location (provider location):  On-site  Telephone visit completed due to the patient did not consent to a video visit.      Again, thank you for allowing me to participate in the care of your patient.        Sincerely,        April Torres PA-C    Electronically signed

## 2025-01-17 ENCOUNTER — TELEPHONE (OUTPATIENT)
Dept: ONCOLOGY | Facility: CLINIC | Age: 65
End: 2025-01-17
Payer: COMMERCIAL

## 2025-01-17 NOTE — TELEPHONE ENCOUNTER
FREE DRUG APPLICATION INITIATED    Medication: CREON [id number] UNITS PO CPEP  Free Drug Program Name:  [facility]  Date Submitted: 1/17/2025  3:19 PM  Phone #: [phone]  Fax #: [phone]        Thank you,  Julissa Oakley Oncology/Transplant Liaison  Phone: [phone]  Fax: [phone]

## 2025-01-20 NOTE — PROGRESS NOTES
1/21/2025    Virtual Visit Details  Type of service:  Video Visit   Video Start Time:  1:08 PM  Video End Time:1:21 PM  Originating Location (pt. Location): Home  Distant Location (provider location):  Off-site  Platform used for Video Visit: Daniel    Referring Provider: April Torres PA-C     Presenting Information:  I spoke to Kitty today to discuss her genetic testing results. Her blood was drawn on 12/31/2024. The BRCA1 and BRCA2 genes with automatic reflex to the Hereditary Cancer Comprehensive Expanded panel was ordered from [facility]. 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 No other variants or mutations were found in the BARD1 gene. Given the uncertain significance of this result, medical management decisions should NOT be made based on this test result alone.    Of note, Kitty tested negative for mutations in the following genes by sequencing and deletion/duplication analysis: ALK, APC, CELIA, AXIN2, BAP1, BLM, BMPR1A, BRCA1, BRCA2, BRIP1, CASR, CDC73, CDH1, CDK4, CDKN1B, CDKN1C, CDKN2A, CEBPA, CHEK2, CTNNA1, DICER1, DIS3L2, EGFR, EPCAM, FH, FLCN, GATA2, GPC3, GREM1, HOXB13, HRAS, KIT, MAX, MBD4, MEN1, MET, MITF, MLH1, MLH3, MRE11, MSH2, MSH3, MSH6, MUTYH, NBN, NF1, NF2, NTHL1, PALB2, PDGFRA, PHOX2B, PMS2, POLD1, POLE, POT1, CQMPG0U, PTCH1, PTCH2, PTEN, RAD50, RAD51C, RAD51D, RB1, RECQL4, RET, RUNX1, SDHA, SDHAF2, SDHB, SDHC, SDHD, SMAD4, SMARCA4, SMARCB1, SMARCE1, STK11, SUFU, TERC, TERT, [id number], TP53, TSC1, TSC2, VHL, WRN, WT1. We reviewed the autosomal dominant inheritance of these genes. Kitty cannot pass on a mutation  "in any of these genes to her children based on this test result. Mutations in these genes do not skip generations.      A copy of the test report can be found in the Laboratory tab, dated 12/31/2024, and named \"Next generation sequencing\".     Interpretation:  We discussed several different interpretations of this inconclusive test result. It is not clear if this variant in the BARD1 gene is associated with increased cancer risk.  1. This variant may be a benign change that does not increase cancer risk.  2. This variant may be a harmful mutation that causes increased risk for cancer. Harmful mutations in the BARD1 gene are associated with increased risk for breast cancer. We discussed that medical management decisions are NOT recommended for individuals with a VUS result.    The laboratory is working to determine if this variant is harmful or benign, and they will contact me if it is reclassified. If this variant is determined to be a benign change, there may be a different gene or combination of genes and environment that are associated with the cancers in this family that are not identifiable using this test. As such, Kitty is encouraged to contact me regularly to review any new genetic testing options that may be appropriate for her.    It is also important to consider that her affected relatives may have had a mutation in one of the genes tested and she did not inherit it. If that is the case, Kitty's cancer may be sporadic and caused by random cellular changes.     Inheritance:  We reviewed the autosomal dominant inheritance of this variant in the BARD1 gene. We discussed that Kitty has a 50% chance to pass this variant to her son. Likewise, each of her siblings has a 50% risk of having the same variant. Because it is unclear what, if any, risk is associated with this variant, clinical genetic testing for this BARD1 variant alone is NOT recommended for relatives.    Screening:  Based on this inconclusive " test result, it is important for Kitty and her relatives to refer back to the family history for appropriate cancer screening.    Kitty should continue to follow up with her oncology team as recommended about her pancreatic cancer and pulmonary carcinoid surveillance.   Due to Kitty s personal history of pancreatic cancer and her paternal first cousin diagnosed with pancreatic cancer, screening for pancreatic cancer may be considered for her siblings based on the international cancer of the pancreas screening (CAPS) consortium guidelines. This screening typically begins at age 50 and involves endoscopic ultrasonography (EUS) and/or MRI/magnetic resonance cholangiopancreatography (Gio et al, Gut 2013. 62: 339-347; Alexander S, et al., Am J Gastroenterol 2015. 110:223-262). Given the significant limitations of this screening, Kitty's siblings could consider meeting with their primary care providers to discuss this screening in more detail.   We discussed that pancreatic cancer screening guidelines may change in the future, therefore Kitty's son should continue to discuss current screening recommendations with his physicians.   Based on her personal and family history, Kitty has a 4.2% lifetime risk of developing breast cancer based on the IBRAHIMA model. Therefore, Kitty does not meet current National Comprehensive Cancer Network (NCCN) guidelines for high risk breast screening, which is offered to women with a 20% lifetime risk or higher. However, it is still important for Kitty to continue with routine breast screening under the care of her physicians. Breast cancer screening is generally recommended to begin approximately 10 years younger than the earliest age of breast cancer diagnosis in the family, or at age 40, whichever comes first. In this family, screening may begin at age 40. Kitty is encouraged to discuss breast screening with her physicians.   Per National Comprehensive Cancer Network (NCCN)  guidelines, individuals with a first degree relative with colorectal cancer diagnosed at any age should begin colonoscopy at age 40, or 10 years before the earliest diagnosis of colorectal cancer, whichever is first. In this family, colonoscopy should start at age 40. Colonoscopy should be repeated every 5 years, or based on colonoscopy findings. This would apply to Kitty, her siblings, and the children of her brother diagnosed with colon cancer. These recommendations may change based on personal and family history as well as personal preference, and should be discussed with an individuals physician.      Other population cancer screening options, such as those recommended by the American Cancer Society and the National Comprehensive Cancer Network (NCCN), are also appropriate for Kitty and her family. These screening recommendations may change if there are changes to Kitty's personal and/or family history of cancer.   Final screening recommendations should be made by each individual's primary care provider.    Additional Testing Considerations:  Although Kitty's genetic testing result is inconclusive, other relatives may still carry a harmful gene mutation associated with breast, prostate, and pancreatic cancer. Genetic counseling is recommended for Kitty's siblings and paternal relatives to discuss genetic testing options. If any of these relatives do pursue genetic testing, Kitty is encouraged to contact me so that we may review the impact of their test results on her.    Summary:  We do not have an explanation for Kitty's personal and family history of cancer. While no harmful genetic changes were identified, Kitty may still be at risk for certain cancers due to family history, environmental factors, or other genetic causes not identified by this test. Because of that, it is important that she continue with cancer screening based on her personal and family history as discussed above.    Genetic testing  is rapidly advancing, and new cancer susceptibility genes will most likely be identified in the future. Therefore, I encouraged Kitty to contact me regularly or if there are changes in her personal or family history. This may change how we assess her cancer risk, screening, and the testing we would offer.    Plan:  1.  A copy of this summary and her test results will be available in [employer].  2.  She plans to follow-up with her other providers.  3.  She should contact me regularly, or sooner if her family history changes.  4.  I will attempt to contact Kitty if the laboratory informs me that this VUS has been reclassified. This may change screening and testing recommendations for Kitty and her relatives.    If Kitty has any further questions, I encouraged her to contact me at [phone].    Time spent on date of the encounter doing chart review, video appointment, documentation, and further activities as noted above: 15 minutes    Hanna Otto MS, [facility]  Licensed, Certified Genetic Counselor  Phone: [phone]

## 2025-01-21 ENCOUNTER — VIRTUAL VISIT (OUTPATIENT)
Dept: ONCOLOGY | Facility: CLINIC | Age: 65
End: 2025-01-21
Payer: COMMERCIAL

## 2025-01-21 DIAGNOSIS — Z80.42 FAMILY HISTORY OF PROSTATE CANCER: ICD-10-CM

## 2025-01-21 DIAGNOSIS — Z80.0 FAMILY HISTORY OF COLON CANCER: ICD-10-CM

## 2025-01-21 DIAGNOSIS — Z80.0 FAMILY HISTORY OF PANCREATIC CANCER: ICD-10-CM

## 2025-01-21 DIAGNOSIS — Z80.3 FAMILY HISTORY OF MALIGNANT NEOPLASM OF BREAST: ICD-10-CM

## 2025-01-21 DIAGNOSIS — D3A.090 BENIGN CARCINOID TUMOR OF LUNG (H): ICD-10-CM

## 2025-01-21 DIAGNOSIS — C25.7 MALIGNANT NEOPLASM OF OTHER PARTS OF PANCREAS (H): Primary | ICD-10-CM

## 2025-01-21 PROCEDURE — 999N000069 HC STATISTIC GENETIC COUNSELING, < 16 MIN: Mod: GT,95

## 2025-01-21 NOTE — LETTER
Cancer Risk Management  Program Locations    [facility]  [facility]  [facility]  [facility]  [facility]  Mailing Address  Cancer Risk Management Program  [facility]  [address] [facility] [address]  [place] [address]    New patient appointments  [phone]    January 21, 2025    Kitty Bangura  [address] [facility] [address]    Dear Kitty,    It was a pleasure talking with you via your virtual genetic counseling visit on 1/21/25.  Below is a copy of the progress note from that recent visit through the Cancer Risk Management Program.  If you have any additional questions, please feel free to contact me.    Referring Provider: April Torres PA-C     Presenting Information:  I spoke to Kitty today to discuss her genetic testing results. Her blood was drawn on 12/31/2024. The BRCA1 and BRCA2 genes with automatic reflex to the Hereditary Cancer Comprehensive Expanded panel was ordered from [facility]'s [facility]. 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 This variant is called c.659T>C (p.[id number]). No other variants or mutations were found in the BARD1 gene. Given the uncertain significance of this result, medical management decisions should NOT be made based on this test result alone.    Of note, Kitty tested negative for mutations in the following genes by sequencing and deletion/duplication analysis: ALK, APC, CELIA, AXIN2, BAP1, BLM, BMPR1A, BRCA1, BRCA2, BRIP1, CASR, CDC73, CDH1, CDK4, CDKN1B, CDKN1C, CDKN2A, CEBPA, CHEK2, CTNNA1, DICER1, DIS3L2,  "EGFR, EPCAM, FH, FLCN, GATA2, GPC3, GREM1, HOXB13, HRAS, KIT, MAX, MBD4, MEN1, MET, MITF, MLH1, MLH3, MRE11, MSH2, MSH3, MSH6, MUTYH, NBN, NF1, NF2, NTHL1, PALB2, PDGFRA, PHOX2B, PMS2, POLD1, POLE, POT1, KWPIM1J, PTCH1, PTCH2, PTEN, RAD50, RAD51C, RAD51D, RB1, RECQL4, RET, RUNX1, SDHA, SDHAF2, SDHB, SDHC, SDHD, SMAD4, SMARCA4, SMARCB1, SMARCE1, STK11, SUFU, TERC, TERT, [id number], TP53, TSC1, TSC2, VHL, WRN, WT1. We reviewed the autosomal dominant inheritance of these genes. Kitty cannot pass on a mutation in any of these genes to her children based on this test result. Mutations in these genes do not skip generations.      A copy of the test report can be found in the Laboratory tab, dated 12/31/2024, and named \"Next generation sequencing\".     Interpretation:  We discussed several different interpretations of this inconclusive test result. It is not clear if this variant in the BARD1 gene is associated with increased cancer risk.  1. This variant may be a benign change that does not increase cancer risk.  2. This variant may be a harmful mutation that causes increased risk for cancer. Harmful mutations in the BARD1 gene are associated with increased risk for breast cancer. We discussed that medical management decisions are NOT recommended for individuals with a VUS result.    The laboratory is working to determine if this variant is harmful or benign, and they will contact me if it is reclassified. If this variant is determined to be a benign change, there may be a different gene or combination of genes and environment that are associated with the cancers in this family that are not identifiable using this test. As such, Kitty is encouraged to contact me regularly to review any new genetic testing options that may be appropriate for her.    It is also important to consider that her affected relatives may have had a mutation in one of the genes tested and she did not inherit it. If that is the case, Kitty's cancer " may be sporadic and caused by random cellular changes.     Inheritance:  We reviewed the autosomal dominant inheritance of this variant in the BARD1 gene. We discussed that Kitty has a 50% chance to pass this variant to her son. Likewise, each of her siblings has a 50% risk of having the same variant. Because it is unclear what, if any, risk is associated with this variant, clinical genetic testing for this BARD1 variant alone is NOT recommended for relatives.    Screening:  Based on this inconclusive test result, it is important for Kitty and her relatives to refer back to the family history for appropriate cancer screening.    Kitty should continue to follow up with her oncology team as recommended about her pancreatic cancer and pulmonary carcinoid surveillance.   Due to Kitty s personal history of pancreatic cancer and her paternal first cousin diagnosed with pancreatic cancer, screening for pancreatic cancer may be considered for her siblings based on the international cancer of the pancreas screening (CAPS) consortium guidelines. This screening typically begins at age 50 and involves endoscopic ultrasonography (EUS) and/or MRI/magnetic resonance cholangiopancreatography (Gio et al, Gut 2013. 62: 339-347; Alexander S, et al., Am J Gastroenterol 2015. 110:223-262). Given the significant limitations of this screening, Kitty's siblings could consider meeting with their primary care providers to discuss this screening in more detail.   We discussed that pancreatic cancer screening guidelines may change in the future, therefore Kitty's son should continue to discuss current screening recommendations with his physicians.   Based on her personal and family history, Kitty has a 4.2% lifetime risk of developing breast cancer based on the IBRAHIMA model. Therefore, Kitty does not meet current National Comprehensive Cancer Network (NCCN) guidelines for high risk breast screening, which is offered to women with a 20%  lifetime risk or higher. However, it is still important for Kitty to continue with routine breast screening under the care of her physicians. Breast cancer screening is generally recommended to begin approximately 10 years younger than the earliest age of breast cancer diagnosis in the family, or at age 40, whichever comes first. In this family, screening may begin at age 40. Kitty is encouraged to discuss breast screening with her physicians.   Per National Comprehensive Cancer Network (NCCN) guidelines, individuals with a first degree relative with colorectal cancer diagnosed at any age should begin colonoscopy at age 40, or 10 years before the earliest diagnosis of colorectal cancer, whichever is first. In this family, colonoscopy should start at age 40. Colonoscopy should be repeated every 5 years, or based on colonoscopy findings. This would apply to Kitty, her siblings, and the children of her brother diagnosed with colon cancer. These recommendations may change based on personal and family history as well as personal preference, and should be discussed with an individuals physician.      Other population cancer screening options, such as those recommended by the American Cancer Society and the National Comprehensive Cancer Network (NCCN), are also appropriate for Kitty and her family. These screening recommendations may change if there are changes to Kitty's personal and/or family history of cancer.   Final screening recommendations should be made by each individual's primary care provider.    Additional Testing Considerations:  Although Kitty's genetic testing result is inconclusive, other relatives may still carry a harmful gene mutation associated with breast, prostate, and pancreatic cancer. Genetic counseling is recommended for Kitty's siblings and paternal relatives to discuss genetic testing options. If any of these relatives do pursue genetic testing, Kitty is encouraged to contact me so that  we may review the impact of their test results on her.    Summary:  We do not have an explanation for Kitty's personal and family history of cancer. While no harmful genetic changes were identified, Kitty may still be at risk for certain cancers due to family history, environmental factors, or other genetic causes not identified by this test. Because of that, it is important that she continue with cancer screening based on her personal and family history as discussed above.    Genetic testing is rapidly advancing, and new cancer susceptibility genes will most likely be identified in the future. Therefore, I encouraged Kitty to contact me regularly or if there are changes in her personal or family history. This may change how we assess her cancer risk, screening, and the testing we would offer.    Plan:  1.  A copy of this summary and her test results will be available in [employer].  2.  She plans to follow-up with her other providers.  3.  She should contact me regularly, or sooner if her family history changes.  4.  I will attempt to contact Kitty if the laboratory informs me that this VUS has been reclassified. This may change screening and testing recommendations for Kitty and her relatives.    If Kitty has any further questions, I encouraged her to contact me at [phone].    Time spent on date of the encounter doing chart review, video appointment, documentation, and further activities as noted above: 15 minutes    Hanna Otto MS, [facility]  Licensed, Certified Genetic Counselor  Phone: [phone]

## 2025-01-21 NOTE — Clinical Note
1/21/2025      Kitty Bangura  [address] [place] [id number]      Dear Colleague,    Thank you for referring your patient, Kitty Bangura, to the [facility]. Please see a copy of my visit note below.    1/21/2025    Virtual Visit Details  Type of service:  Video Visit   Video Start Time:  1:08 PM  Video End Time:1:21 PM  Originating Location (pt. Location): Home  Distant Location (provider location):  Off-site  Platform used for Video Visit: Daniel    Referring Provider: April Torres PA-C     Presenting Information:  I spoke to Kitty today to discuss her genetic testing results. Her blood was drawn on 12/31/2024. The BRCA1 and BRCA2 genes with automatic reflex to the Hereditary Cancer Comprehensive Expanded panel was ordered from [facility]. 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 No other variants or mutations were found in the BARD1 gene. Given the uncertain significance of this result, medical management decisions should NOT be made based on this test result alone.    Of note, Kitty tested negative for mutations in the following genes by sequencing and deletion/duplication analysis: ALK, APC, CELIA, AXIN2, BAP1, BLM, BMPR1A, BRCA1, BRCA2, BRIP1, CASR, CDC73, CDH1, CDK4, CDKN1B, CDKN1C, CDKN2A, CEBPA, CHEK2, CTNNA1, DICER1, DIS3L2, EGFR, EPCAM, FH, FLCN, GATA2, GPC3, GREM1, HOXB13, HRAS, KIT, MAX, MBD4, MEN1, MET, MITF, MLH1, MLH3, MRE11, MSH2, MSH3, MSH6, MUTYH, NBN, NF1, NF2, NTHL1, PALB2, PDGFRA, PHOX2B, PMS2, POLD1, POLE, POT1, ZBRGV2R, PTCH1, PTCH2, PTEN, RAD50, RAD51C, RAD51D, RB1,  "RECQL4, RET, RUNX1, SDHA, SDHAF2, SDHB, SDHC, SDHD, SMAD4, SMARCA4, SMARCB1, SMARCE1, STK11, SUFU, TERC, TERT, PKEY601, TP53, TSC1, TSC2, VHL, WRN, WT1. We reviewed the autosomal dominant inheritance of these genes. Kitty cannot pass on a mutation in any of these genes to her children based on this test result. Mutations in these genes do not skip generations.      A copy of the test report can be found in the Laboratory tab, dated 12/31/2024, and named \"Next generation sequencing\".     Interpretation:  We discussed several different interpretations of this inconclusive test result. It is not clear if this variant in the BARD1 gene is associated with increased cancer risk.  1. This variant may be a benign change that does not increase cancer risk.  2. This variant may be a harmful mutation that causes increased risk for cancer. Harmful mutations in the BARD1 gene are associated with increased risk for breast cancer. We discussed that medical management decisions are NOT recommended for individuals with a VUS result.    The laboratory is working to determine if this variant is harmful or benign, and they will contact me if it is reclassified. If this variant is determined to be a benign change, there may be a different gene or combination of genes and environment that are associated with the cancers in this family that are not identifiable using this test. As such, Kitty is encouraged to contact me regularly to review any new genetic testing options that may be appropriate for her.    It is also important to consider that her affected relatives may have had a mutation in one of the genes tested and she did not inherit it. If that is the case, Kitty's cancer may be sporadic and caused by random cellular changes.     Inheritance:  We reviewed the autosomal dominant inheritance of this variant in the BARD1 gene. We discussed that Kitty has a 50% chance to pass this variant to her son. Likewise, each of her siblings " has a 50% risk of having the same variant. Because it is unclear what, if any, risk is associated with this variant, clinical genetic testing for this BARD1 variant alone is NOT recommended for relatives.    Screening:  Based on this inconclusive test result, it is important for Kitty and her relatives to refer back to the family history for appropriate cancer screening.    Kitty should continue to follow up with her oncology team as recommended about her pancreatic cancer and pulmonary carcinoid surveillance.   Due to iKtty s personal history of pancreatic cancer and her paternal first cousin diagnosed with pancreatic cancer, screening for pancreatic cancer may be considered for her siblings based on the international cancer of the pancreas screening (CAPS) consortium guidelines. This screening typically begins at age 50 and involves endoscopic ultrasonography (EUS) and/or MRI/magnetic resonance cholangiopancreatography (Gio et al, Gut 2013. 62: 339-347; Alexander S, et al., Am J Gastroenterol 2015. 110:223-262). Given the significant limitations of this screening, Kitty's siblings could consider meeting with their primary care providers to discuss this screening in more detail.   Based on her personal and family history, Kitty has a 4.2% lifetime risk of developing breast cancer based on the IBRAHIMA model. Therefore, Kitty does not meet current National Comprehensive Cancer Network (NCCN) guidelines for high risk breast screening, which is offered to women with a 20% lifetime risk or higher. However, it is still important for Kitty to continue with routine breast screening under the care of her physicians. Breast cancer screening is generally recommended to begin approximately 10 years younger than the earliest age of breast cancer diagnosis in the family, or at age 40, whichever comes first. In this family, screening may begin at age 40. Kitty is encouraged to discuss breast screening with her physicians.    Per National Comprehensive Cancer Network (NCCN) guidelines, individuals with a first degree relative with colorectal cancer diagnosed at any age should begin colonoscopy at age 40, or 10 years before the earliest diagnosis of colorectal cancer, whichever is first. In this family, colonoscopy should start at age 40. Colonoscopy should be repeated every 5 years, or based on colonoscopy findings. This would apply to Kitty, her siblings, and the children of her brother diagnosed with colon cancer. These recommendations may change based on personal and family history as well as personal preference, and should be discussed with an individuals physician.      Other population cancer screening options, such as those recommended by the American Cancer Society and the National Comprehensive Cancer Network (NCCN), are also appropriate for Kitty and her family. These screening recommendations may change if there are changes to Kitty's personal and/or family history of cancer.   Final screening recommendations should be made by each individual's primary care provider.    Additional Testing Considerations:  Although Kitty's genetic testing result is inconclusive, other relatives may still carry a harmful gene mutation associated with breast, prostate, and pancreatic cancer. Genetic counseling is recommended for Kitty's siblings and paternal relatives to discuss genetic testing options. If any of these relatives do pursue genetic testing, Kitty is encouraged to contact me so that we may review the impact of their test results on her.    Summary:  We do not have an explanation for Kitty's personal and family history of cancer. While no harmful genetic changes were identified, Kitty may still be at risk for certain cancers due to family history, environmental factors, or other genetic causes not identified by this test. Because of that, it is important that she continue with cancer screening based on her personal and  family history as discussed above.    Genetic testing is rapidly advancing, and new cancer susceptibility genes will most likely be identified in the future. Therefore, I encouraged Kitty to contact me regularly or if there are changes in her personal or family history. This may change how we assess her cancer risk, screening, and the testing we would offer.    Plan:  1.  A copy of this summary and her test results will be available in [facility].  2.  She plans to follow-up with her other providers.  3.  She should contact me regularly, or sooner if her family history changes.  4.  I will attempt to contact Kitty if the laboratory informs me that this VUS has been reclassified. This may change screening and testing recommendations for Kitty and her relatives.    If Kitty has any further questions, I encouraged her to contact me at [phone].    Time spent on date of the encounter doing chart review, video appointment, documentation, and further activities as noted above: *** minutes    Hanna Otto MS, [facility]  Licensed, Certified Genetic Counselor  Phone: [phone]      Again, thank you for allowing me to participate in the care of your patient.        Sincerely,        Hanna Otto GC    Electronically signed

## 2025-01-22 NOTE — PATIENT INSTRUCTIONS
Genetic Testing  Genetic testing involved a blood or saliva test which looked at the genetic information in select genes for variants associated with cancer risk.  This testing may have included analysis of a single gene due to a known variant in the family, multiple genes most associated with the cancers in a family, or an expanded panel of genes related to many types of cancers.    Results  There are several possible genetic test results, including:   Positive--a harmful mutation (also known as a  pathogenic  or  likely pathogenic  variant) was identified in a gene associated with increased cancer risk.  These risks, as well as medical management options, depend on the specific genetic variant identified.    Negative--no variants were identified in the genes analyzed   Variant of unknown significance--a variant was identified in one or more genes, though it is currently unclear how this impacts cancer risk in the family.  Genetic testing labs are working to collect evidence about these uncertain variants and may provide updates in the future.    What is a Variant of Unknown Significance?  A variant of unknown significance (VUS) is a genetic change with unclear clinical significance.  Scientists currently do not know if this specific variant is associated with increased cancer risks,  or if it is a benign (harmless) change with no impact on health.       A variant may be of uncertain significance for several reasons.  It is possible that this specific variant has not been seen before by the laboratory, or only in a small number of families.  There is currently not enough information to know how this variant may impact your health.          Reclassification  Genetic testing laboratories and researchers are collecting evidence to determine the importance of variants of unknown significance.  Many variants of uncertain significance are later reclassified as benign findings, however some may be associated with  increased cancer risk.  Laboratories will often notify the genetic counselor/ordering provider when a patient s VUS has been reclassified.        Some families may be candidates for participation in the laboratory s variant research programs to help determine the importance of their VUS.  Participating in these programs does not guarantee that families will learn the significance of their VUS immediately.  It could be months or years before a VUS is reclassified.       Screening Recommendations  A combination of personal and family history factors may inform cancer risk and medical management recommendations.  Population cancer screening options, such as those recommended by the American Cancer Society and the National Comprehensive Cancer Network (NCCN) are appropriate for many families at average risk for cancer.  However, earlier and/or more frequent screening may be recommended based on personal factors (lifestyle, exposures, medications, screening results), family history of cancer, and sometimes genetic factors.  These cancer risk management options should be discussed in more detail with an individual's medical providers.      It is usually not recommended that other relatives have genetic testing for the VUS unless it is done as part of the laboratory s variant research program because an individual s test results should not influence their cancer screening until we determine the importance of the VUS.  Your genetic counselor can help you and your relatives understand the risks and benefits of all genetic testing and cancer screening options.    Please call us if you have any questions or concerns.   Cancer Risk Management Program (Appointments: [phone])  Paras Cavazos, MS [facility]  [phone]  Hanna Otto, MS, [facility] [phone]  Princess Oliveira, MS, [facility]  [phone]  Mami Rutledge, MS, [facility]  [phone]  Tiny Padron, MS, [facility]  [phone]  Kristina Rico, MS, [facility] [phone]  Leona Gil MS,  [facility] [phone]

## 2025-01-23 NOTE — TELEPHONE ENCOUNTER
Refaxed Dr portion of application to [employer] @[phone]. It was cut-off.    Thank you,    Gail Verma  Oncology Pharmacy Liaison MARGO [email or web address]  Phone: [phone]  Fax: [phone]     present

## 2025-02-01 DIAGNOSIS — C25.0 MALIGNANT NEOPLASM OF HEAD OF PANCREAS (H): ICD-10-CM

## 2025-02-01 DIAGNOSIS — K86.89 PANCREATIC INSUFFICIENCY: Primary | ICD-10-CM

## 2025-02-01 NOTE — PROGRESS NOTES
Elidia script sent to [facility]     Kind regards,  April Torres PA-C, PhD  [facility]

## 2025-03-12 DIAGNOSIS — G62.0 DRUG-INDUCED POLYNEUROPATHY: ICD-10-CM

## 2025-03-12 DIAGNOSIS — E11.9 TYPE 2 DIABETES MELLITUS WITHOUT COMPLICATION, WITHOUT LONG-TERM CURRENT USE OF INSULIN (H): ICD-10-CM

## 2025-03-12 RX ORDER — METFORMIN HYDROCHLORIDE 500 MG/1
500 TABLET, EXTENDED RELEASE ORAL 2 TIMES DAILY WITH MEALS
Qty: 180 TABLET | Refills: 0 | Status: SHIPPED | OUTPATIENT
Start: 2025-03-12

## 2025-03-12 RX ORDER — GABAPENTIN 100 MG/1
CAPSULE ORAL
Qty: 360 CAPSULE | Refills: 0 | Status: SHIPPED | OUTPATIENT
Start: 2025-03-12

## 2025-04-17 DIAGNOSIS — K86.89 PANCREATIC INSUFFICIENCY: Primary | ICD-10-CM

## 2025-04-17 DIAGNOSIS — C25.0 MALIGNANT NEOPLASM OF HEAD OF PANCREAS (H): ICD-10-CM

## 2025-04-17 NOTE — TELEPHONE ENCOUNTER
David Chen     Kitty would like to take the 5 capsules of the smaller capsules instead of 4 of the larger ones. She is having trouble swallowing them. Please send new RX to [facility] pharmacy, if appropriate. She also mentioned that taking 2 at snack time would work.     Thank you,     Gail Verma   Oncology Pharmacy Liaison [facility] [email or web address]   Phone: [phone]   Fax: [phone]     Prescribed Creon 24K tabs, 5 tabs TID and 2-3 tabs with snacks. Max 24 tabs a day and sent to [facility] pharmacy, mail order in [place]. Qty 2160 with 4 refills    April Torres PA-C, PhD  [facility]

## 2025-05-27 DIAGNOSIS — E11.9 TYPE 2 DIABETES MELLITUS WITHOUT COMPLICATION, WITHOUT LONG-TERM CURRENT USE OF INSULIN (H): ICD-10-CM

## 2025-05-27 RX ORDER — METFORMIN HYDROCHLORIDE 500 MG/1
500 TABLET, EXTENDED RELEASE ORAL 2 TIMES DAILY WITH MEALS
Qty: 180 TABLET | Refills: 1 | Status: SHIPPED | OUTPATIENT
Start: 2025-05-27

## 2025-05-28 ENCOUNTER — TELEPHONE (OUTPATIENT)
Dept: ONCOLOGY | Facility: CLINIC | Age: 65
End: 2025-05-28
Payer: COMMERCIAL

## 2025-05-28 ENCOUNTER — MYC MEDICAL ADVICE (OUTPATIENT)
Dept: ONCOLOGY | Facility: CLINIC | Age: 65
End: 2025-05-28
Payer: COMMERCIAL

## 2025-05-28 DIAGNOSIS — K86.89 PANCREATIC INSUFFICIENCY: ICD-10-CM

## 2025-05-28 DIAGNOSIS — C25.0 MALIGNANT NEOPLASM OF HEAD OF PANCREAS (H): ICD-10-CM

## 2025-05-28 NOTE — TELEPHONE ENCOUNTER
MALCOM APPROVED    Medication: CREON [id number] UNITS PO CPEP  Amount: $ 6,000  [facility] [place] Name: [facility] Phone:    Foundation Effective Date: 4/28/2025  Foundation Expiration Date: 4/27/2026  Additional Information:   Patient Notified: yes, convert to FVSP from PAP        Thank you,    Cesia Verma  Oncology Pharmacy Liaison II  [email or web address]  Phone: [phone]  Fax: [phone]

## 2025-05-29 NOTE — TELEPHONE ENCOUNTER
----- Message from Kathy Verma sent at 5/29/2025  8:09 AM CDT -----  Regarding: Creon to [facility] pharmacy  April,    Could you please send a prescription for Creon with refills, if appropriate, to [facility]? She is no longer on free drug, has a yuliana, and can now fill with us.    She is due soon!    Thank you,    Gail Verma  Oncology Pharmacy Liaison II  [email or web address]  Phone: [phone]  Fax: [phone]

## 2025-06-05 NOTE — PROGRESS NOTES
"Virtual Visit Details    Type of service:  Video Visit   Video Start Time: 12:48 PM  Video End Time:1:28 PM    Originating Location (pt. Location): Home    Distant Location (provider location):  On-site  Platform used for Video Visit: [facility]    CANCER SURVIVORSHIP VISIT  2025   Care Team:    Care Team   Primary Care Provider Kathy Jurado   Surgeon  Ja Byrd MD Bhargava, Amit, MD    Medical Oncologist  Jovany Monk MD       REASON FOR VISIT: survivorship visit for history of pancreatic cancer    CANCER HISTORY AND TREATMENT:    History of stage IB pancreatic cancer (tail) diagnosed in 2017  *Diagnosis:  2017. Presented with acute pancreatitis. 57 years old. Clinical stage IB (T2, N0, MX-small pulmonary nodules).   *Neoadjuvant chemotherapy: 4 cycles of FOLFIRINOX. She had acute pancreatitis after cycle one and had a cyst gastrostomy. Also had neutropenia and chemo was delayed a week and neulasta was added on to cycle 2. Cycle 3 was dose reduced due to neuropathy.   *Surgery: 2018 Laparoscopic distal pancreatectomy, omental resection, cholecystectomy, and splenectomy.  Complete pathologic response.   *Adjuvant Chemotherapy: 4 cycles of gemcitabine/capecitabine. Cycle 3 was dose reduced due to mucositis.   *Genetic testin Kitty was found to have a variant of uncertain significance (VUS) in the BARD1 gene. This variant is called c.659T>C ([id number]). No other variants or mutations were found in the BARD1 gene. Given the uncertain significance of this result, medical management decisions should NOT be made based on this test result alone.   *Other information: 2021 wedge resection for new left lower lung nodule- carcinoid tumor.   *Late effects: Pancreatic insufficiency, RUQ pain, neuopathy on gabapentin    INTERVAL HISTORY:   Kitty returns for 1 year follow-up. Doing better. Feeling like \"living rather than existing\"! Been golfing, doing yoga, gardening, hiking, and " traveling. Exercising every day. Attributes to connecting to support groups and that her pain after meals is better. Has met with Tao Acuña which she found very helpful. MIRELA has follicular lymphoma- Kitty is caregiver- been tough. Went to the survivorship conference and enjoyed it. Mourning the enjoyment of food. Planning on hiking trip this fal.     Pancreatic insufficiency symptoms stable with current creon dose. Now has a yuliana for coverage! Occasional nausea after meals- Zofran helps. If she has to go out of the house early she will take a 1/2 a imodium which helps.     Occasional episodes of intermittent sharp LUQ pain, severe, nothing relieves. Nearly went to the ED with one episode. Also intermittent episodes of sharp shooting pain in her right big toe. Not sure if related to her hiking but typically her neuropathy is managed by her gabapentin dose.     Briefly discussed sexual health again. Pt reports no desire and this does not bother her. Did endorse vaginal dryness and encouraged pt to use coconut oil for vaginal lubrication.    Has an appt coming up with dermatology for a skin check- has some spots she wants looked at    Hgb A1c is stable on metformin.       Past Medical History:   Diagnosis Date    Arthritis Post chemo    Suspected    Depressive disorder     have had therapy    Diabetes (H)     History of blood transfusion     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 A few calcifications are noted along the lateral margin of the mass.    Pneumonia     2016    PONV (postoperative nausea and vomiting)    Pre-diabetes     Current Outpatient Medications   Medication Sig Dispense Refill    acetaminophen (TYLENOL) 500 MG tablet Take 2 tablets (1,000 mg) by mouth every 8 hours 100 tablet 1    alcohol swab  prep pads Use to swab area of injection/avel as directed. 100 each 3    amoxicillin-clavulanate (AUGMENTIN) 875-125 MG tablet If fever of 100.4 or higher, take 1 tablet and seek medical care immediately (Patient not taking: Reported on 12/17/2024) 1 tablet 0    blood glucose (ONETOUCH ULTRA) test strip USE 1 STRIP TO CHECK GLUCOSE TWICE DAILY OR  AS  DIRECTED 100 strip 2    blood glucose (ONETOUCH VERIO IQ) test strip Use to test blood sugar 4 times daily or as directed. 400 each 3    blood glucose calibration (NO BRAND SPECIFIED) solution To accompany: Blood Glucose Monitor Brands: per insurance. 1 each 3    blood glucose monitoring (NO BRAND SPECIFIED) meter device kit Use to test blood sugar 2 times daily or as directed. Preferred blood glucose meter OR supplies to accompany: Blood Glucose Monitor Brands: per insurance. 1 kit 0    clobetasol (TEMOVATE) 0.05 % GEL topical gel Apply topically 2 times daily 30 g 1    clobetasol (TEMOVATE) 0.05 % GEL topical gel Apply topically 2 times daily 15 g 1    gabapentin (NEURONTIN) 100 MG capsule TAKE TWO CAPSULES (200 MG) BY MOUTH TWICE DAILY 360 capsule 0    lipase-protease-amylase (CREON) 72116-58443-254896 units CPEP per EC capsule Take 5 capsules with meals and 2-3 capsules with snacks, up to 24 tabs a day 2160 capsule 4    metFORMIN (GLUCOPHAGE XR) 500 MG 24 hr tablet Take 1 tablet (500 mg) by mouth 2 times daily (with meals). 180 tablet 1    ondansetron (ZOFRAN) 4 MG tablet Take 1 tablet (4 mg) by mouth every 6 hours as needed for nausea. 20 tablet 1    OneTouch Delica Lancets 33G MISC 4 lancets daily 150 each 3    thin (NO BRAND SPECIFIED) lancets Use with lanceting device. To accompany: Blood Glucose Monitor Brands: per insurance. 100 each 6     No current facility-administered medications for this visit.     Facility-Administered Medications Ordered in Other Visits   Medication Dose Route Frequency Provider Last Rate Last Admin    heparin 100 UNIT/ML injection 5  mL  5 mL Intracatheter Once Art Guevara MD             No Known Allergies      Family History   Problem Relation Age of Onset    Heart Disease Father     Hyperlipidemia Father     Coronary Artery Disease Father         Quad Bypass,     Heart Disease Brother     Diabetes Mother     Hypertension Mother     Obesity Mother     Depression Mother     Diabetes Maternal Grandfather     Hypertension Maternal Grandfather     Obesity Maternal Grandfather     Diabetes Sister     Hypertension Sister     Depression Sister     Anxiety Disorder Sister     Obesity Sister     Hyperlipidemia Sister     Mental Illness Sister         Schizophrenia    Hypertension Brother     Hyperlipidemia Brother     Heart Disease Brother     Coronary Artery Disease Brother         Bypass, multiple stents, ongoing    Prostate Cancer Brother         Stage 4 - removed    Breast Cancer Cousin     Breast Cancer Cousin     Breast Cancer Cousin     Colon Cancer Other     Other Cancer Other         Mesothelioma    Depression Sister     Anxiety Disorder Sister     Obesity Sister     Anxiety Disorder Brother     Cancer Brother 64        colon cancer    Colon Cancer Brother         Metastasized Stage 4 -  2020    Anxiety Disorder Son     Depression Son     Mental Illness Sister         Schizophrenia    Hypertension Sister     Obesity Maternal Grandmother     Obesity Sister     Hypertension Sister     Thyroid Disease Sister     Diabetes Nephew     Depression Nephew     Mental Illness Nephew         Ptsd    Hypertension Brother     Diabetes Other     Other Cancer Cousin         Pancreatic    Depression Niece     Substance Abuse Sister         Alcohol    Substance Abuse Brother         Alcohol    Coronary Artery Disease Paternal Grandfather             Breast Cancer Other         Aunt        Social history:  Living situation: , lives in [place].   Occupation: Retired   Tobacco use:  Tobacco Use      Smoking  "status: Former        Packs/day: 0.00        Years: 0.5 packs/day for 6.0 years (3.0 ttl pk-yrs)        Types: Cigarettes        Start date: 1975        Quit date: 1981        Years since quittin.4        Passive exposure: Past      Smokeless tobacco: Never  ETOH use: 0  Frequency of exercise: Daily  Duration of exercise: walking 1 hr  Diet: healthy    Video physical exam  Ht 1.651 m (5' 5\")   Wt 62.6 kg (138 lb)   BMI 22.96 kg/m    General: Patient appears well in no acute distress.   Skin: No visualized rash or lesions on visualized skin  Eyes: EOMI, no erythema, sclera icterus or discharge noted  Resp: Appears to be breathing comfortably without accessory muscle usage, speaking in full sentences, no cough  MSK: Appears to have normal range of motion based on visualized movements  Neurologic: No apparent tremors, facial movements symmetric  Psych: affect versatile, alert and oriented    IMPRESSION/PLAN:    History of stage IB pancreatic cancer (tail) diagnosed in 2017  S/p neoadjuvant chemo, surgery and adjuvant chemotherapy. She had complete pathologic response.  8 years from diagnosis without signs of recurrence  Decided to continue annual CT CAP, given new symptoms we will do that now.   Follow-up in 1 year (virtual ok if having annual PCP visit)    Potential late effects related to surgery:    -Risks could include exocrine and endocrine failure.  Recommend diabetes screening w/ PCP. Pre-diabetes, on metformin.     -Risks related to splenectomy include infection from encapsulated organisms. Recommend a booster of PPSV23 and  meningococcal vaccine every 5 years. She is at higher risk for mortality from influenza and COVID and vaccines are recommended per CDC guidelines. She should have access to an emergency supply of antibiotics (Augmentin 875 mg) and should seek prompt emergency care for fever signs of systemic infection. She should also seek care with animal or tick bites. If traveling to a " malaria endemic area, she should take prophylaxis.     Potential late effects related to chemotherapy:    - She did develop peripheral neuropathy, notes that her feet and hands often feel cold. She manages these symptoms w/ warm socks, hand/foot warmers, and blankets. On gabapentin 2 tabs BID since 2018. She is satisfied with the symptom relief that she achieves with these interventions. Her newer symptoms (sharp shooting pain could be related to musculoskeletal changes in low back, metformin, or something else. Will start with a scan.     - She did develop cognitive changes on treatment. She reports short term memory loss which prompted her to retire from her position as a . Offered cognitive rehab which patient has declined.     General late effects screenings and recommendations  -Coping: much better!    -Cancer screening. She should undergo routine screening for women in her age group.     -Healthy lifestyle. She should maintain a healthy weight with a BMI between 20-25. She should exercise at least 150 minutes weekly at moderate intensity. She should see the eye doctor every 1-2 years, and dentist every 6 months for cleanings. She should not use any tobacco. She should minimize alcohol intake. If continuing to drink, should follow CDC recommendations for no more than 1 alcoholic drink/day for women.    Gave resources for our [facility] Cancer Survivorship class series and mailings list for educational opportunities. [email or web address]    Cancer treatment summary was sent electronically to the patient and her PCP.      The total time of this encounter amounted to 60 minutes.This time included time spent with the patient, prep work, ordering tests, and performing post visit documentation.    April Torres, PhD, MPAS, PA-C  [facility]

## 2025-06-12 ENCOUNTER — VIRTUAL VISIT (OUTPATIENT)
Dept: ONCOLOGY | Facility: CLINIC | Age: 65
End: 2025-06-12
Attending: PHYSICIAN ASSISTANT
Payer: COMMERCIAL

## 2025-06-12 VITALS — WEIGHT: 138 LBS | HEIGHT: 65 IN | BODY MASS INDEX: 22.99 KG/M2

## 2025-06-12 DIAGNOSIS — G62.9 NEUROPATHY: ICD-10-CM

## 2025-06-12 DIAGNOSIS — R10.12 LUQ ABDOMINAL PAIN: ICD-10-CM

## 2025-06-12 DIAGNOSIS — C25.9 PANCREATIC ADENOCARCINOMA (H): Primary | ICD-10-CM

## 2025-06-12 NOTE — PATIENT INSTRUCTIONS
The url for the study we discussed is https://redcap.link/cnstudy    The Negative space resource for caregivers- [email or web address]-[email or web address]

## 2025-06-12 NOTE — LETTER
2025      Kitty Bangura  [address] [place] [id number]      Dear Colleague,    Thank you for referring your patient, Kitty Bangura, to the [facility]. Please see a copy of my visit note below.    Virtual Visit Details    Type of service:  Video Visit   Video Start Time: 12:48 PM  Video End Time:1:28 PM    Originating Location (pt. Location): Home    Distant Location (provider location):  On-site  Platform used for Video Visit: [facility]    CANCER SURVIVORSHIP VISIT  2025   Care Team:    Care Team   Primary Care Provider Kathy Jurado   Surgeon  Ja Byrd MD Bhargava, Amit, MD    Medical Oncologist  Jovany Monk MD       REASON FOR VISIT: survivorship visit for history of pancreatic cancer    CANCER HISTORY AND TREATMENT:    History of stage IB pancreatic cancer (tail) diagnosed in   *Diagnosis:  2017. Presented with acute pancreatitis. 57 years old. Clinical stage IB (T2, N0, MX-small pulmonary nodules).   *Neoadjuvant chemotherapy: 4 cycles of FOLFIRINOX. She had acute pancreatitis after cycle one and had a cyst gastrostomy. Also had neutropenia and chemo was delayed a week and neulasta was added on to cycle 2. Cycle 3 was dose reduced due to neuropathy.   *Surgery: 2018 Laparoscopic distal pancreatectomy, omental resection, cholecystectomy, and splenectomy.  Complete pathologic response.   *Adjuvant Chemotherapy: 4 cycles of gemcitabine/capecitabine. Cycle 3 was dose reduced due to mucositis.   *Genetic testin Kitty was found to have a variant of uncertain significance (VUS) in the BARD1 gene. This variant is called c.659T>C ([id number]). No other variants or mutations were found in the BARD1 gene. Given the uncertain significance of this result, medical management decisions should NOT be made based on this test result alone.   *Other information: 2021 wedge resection for new left lower lung nodule- carcinoid tumor.  "  *Late effects: Pancreatic insufficiency, RUQ pain, neuopathy on gabapentin    INTERVAL HISTORY:   Kitty returns for 1 year follow-up. Doing better. Feeling like \"living rather than existing\"! Been golfing, doing yoga, gardening, hiking, and traveling. Exercising every day. Attributes to connecting to support groups and that her pain after meals is better. Has met with Tao Acuña which she found very helpful. DIL has follicular lymphoma- Kitty is caregiver- been tough. Went to the survivorship conference and enjoyed it. Mourning the enjoyment of food. Planning on hiking trip this fal.     Pancreatic insufficiency symptoms stable with current creon dose. Now has a yuliana for coverage! Occasional nausea after meals- Zofran helps. If she has to go out of the house early she will take a 1/2 a imodium which helps.     Occasional episodes of intermittent sharp LUQ pain, severe, nothing relieves. Nearly went to the ED with one episode. Also intermittent episodes of sharp shooting pain in her right big toe. Not sure if related to her hiking but typically her neuropathy is managed by her gabapentin dose.     Briefly discussed sexual health again. Pt reports no desire and this does not bother her. Did endorse vaginal dryness and encouraged pt to use coconut oil for vaginal lubrication.    Has an appt coming up with dermatology for a skin check- has some spots she wants looked at    Hgb A1c is stable on metformin.       Past Medical History:   Diagnosis Date     Arthritis Post chemo    Suspected     Depressive disorder     have had therapy     Diabetes (H)      History of blood transfusion      History of total splenectomy 04/17/2018     Necrotizing pancreatitis      Necrotizing pancreatitis 12/09/2017     Pancreatic cancer (H)      Pancreatic mass 11/01/2017    On CT abd/pelvis: 3 cm heterogeneous mass within the tail of the pancreas. A few calcifications are noted along the lateral margin of the mass.     Pneumonia     " 2016     PONV (postoperative nausea and vomiting)    Pre-diabetes     Current Outpatient Medications   Medication Sig Dispense Refill     acetaminophen (TYLENOL) 500 MG tablet Take 2 tablets (1,000 mg) by mouth every 8 hours 100 tablet 1     alcohol swab prep pads Use to swab area of injection/avel as directed. 100 each 3     amoxicillin-clavulanate (AUGMENTIN) 875-125 MG tablet If fever of 100.4 or higher, take 1 tablet and seek medical care immediately (Patient not taking: Reported on 12/17/2024) 1 tablet 0     blood glucose (ONETOUCH ULTRA) test strip USE 1 STRIP TO CHECK GLUCOSE TWICE DAILY OR  AS  DIRECTED 100 strip 2     blood glucose (ONETOUCH VERIO IQ) test strip Use to test blood sugar 4 times daily or as directed. 400 each 3     blood glucose calibration (NO BRAND SPECIFIED) solution To accompany: Blood Glucose Monitor Brands: per insurance. 1 each 3     blood glucose monitoring (NO BRAND SPECIFIED) meter device kit Use to test blood sugar 2 times daily or as directed. Preferred blood glucose meter OR supplies to accompany: Blood Glucose Monitor Brands: per insurance. 1 kit 0     clobetasol (TEMOVATE) 0.05 % GEL topical gel Apply topically 2 times daily 30 g 1     clobetasol (TEMOVATE) 0.05 % GEL topical gel Apply topically 2 times daily 15 g 1     gabapentin (NEURONTIN) 100 MG capsule TAKE TWO CAPSULES (200 MG) BY MOUTH TWICE DAILY 360 capsule 0     lipase-protease-amylase (CREON) 14884-87223-979211 units CPEP per EC capsule Take 5 capsules with meals and 2-3 capsules with snacks, up to 24 tabs a day 2160 capsule 4     metFORMIN (GLUCOPHAGE XR) 500 MG 24 hr tablet Take 1 tablet (500 mg) by mouth 2 times daily (with meals). 180 tablet 1     ondansetron (ZOFRAN) 4 MG tablet Take 1 tablet (4 mg) by mouth every 6 hours as needed for nausea. 20 tablet 1     OneTouch Delica Lancets 33G MISC 4 lancets daily 150 each 3     thin (NO BRAND SPECIFIED) lancets Use with lanceting device. To accompany: Blood Glucose  Monitor Brands: per insurance. 100 each 6     No current facility-administered medications for this visit.     Facility-Administered Medications Ordered in Other Visits   Medication Dose Route Frequency Provider Last Rate Last Admin     heparin 100 UNIT/ML injection 5 mL  5 mL Intracatheter Once Art Guevara MD             No Known Allergies      Family History   Problem Relation Age of Onset     Heart Disease Father      Hyperlipidemia Father      Coronary Artery Disease Father         Quad Bypass,      Heart Disease Brother      Diabetes Mother      Hypertension Mother      Obesity Mother      Depression Mother      Diabetes Maternal Grandfather      Hypertension Maternal Grandfather      Obesity Maternal Grandfather      Diabetes Sister      Hypertension Sister      Depression Sister      Anxiety Disorder Sister      Obesity Sister      Hyperlipidemia Sister      Mental Illness Sister         Schizophrenia     Hypertension Brother      Hyperlipidemia Brother      Heart Disease Brother      Coronary Artery Disease Brother         Bypass, multiple stents, ongoing     Prostate Cancer Brother         Stage 4 - removed     Breast Cancer Cousin      Breast Cancer Cousin      Breast Cancer Cousin      Colon Cancer Other      Other Cancer Other         Mesothelioma     Depression Sister      Anxiety Disorder Sister      Obesity Sister      Anxiety Disorder Brother      Cancer Brother 64        colon cancer     Colon Cancer Brother         Metastasized Stage 4 -  2020     Anxiety Disorder Son      Depression Son      Mental Illness Sister         Schizophrenia     Hypertension Sister      Obesity Maternal Grandmother      Obesity Sister      Hypertension Sister      Thyroid Disease Sister      Diabetes Nephew      Depression Nephew      Mental Illness Nephew         Ptsd     Hypertension Brother      Diabetes Other      Other Cancer Cousin         Pancreatic     Depression Niece       "Substance Abuse Sister         Alcohol     Substance Abuse Brother         Alcohol     Coronary Artery Disease Paternal Grandfather              Breast Cancer Other         Aunt        Social history:  Living situation: , lives in [place].   Occupation: Retired   Tobacco use:  Tobacco Use      Smoking status: Former        Packs/day: 0.00        Years: 0.5 packs/day for 6.0 years (3.0 ttl pk-yrs)        Types: Cigarettes        Start date: 1975        Quit date: 1981        Years since quittin.4        Passive exposure: Past      Smokeless tobacco: Never  ETOH use: 0  Frequency of exercise: Daily  Duration of exercise: walking 1 hr  Diet: healthy    Video physical exam  Ht 1.651 m (5' 5\")   Wt 62.6 kg (138 lb)   BMI 22.96 kg/m    General: Patient appears well in no acute distress.   Skin: No visualized rash or lesions on visualized skin  Eyes: EOMI, no erythema, sclera icterus or discharge noted  Resp: Appears to be breathing comfortably without accessory muscle usage, speaking in full sentences, no cough  MSK: Appears to have normal range of motion based on visualized movements  Neurologic: No apparent tremors, facial movements symmetric  Psych: affect versatile, alert and oriented    IMPRESSION/PLAN:    History of stage IB pancreatic cancer (tail) diagnosed in 2017  S/p neoadjuvant chemo, surgery and adjuvant chemotherapy. She had complete pathologic response.  8 years from diagnosis without signs of recurrence  Decided to continue annual CT CAP, given new symptoms we will do that now.   Follow-up in 1 year (virtual ok if having annual PCP visit)    Potential late effects related to surgery:    -Risks could include exocrine and endocrine failure.  Recommend diabetes screening w/ PCP. Pre-diabetes, on metformin.     -Risks related to splenectomy include infection from encapsulated organisms. Recommend a booster of PPSV23 and  meningococcal vaccine every 5 years. She is at higher " risk for mortality from influenza and COVID and vaccines are recommended per CDC guidelines. She should have access to an emergency supply of antibiotics (Augmentin 875 mg) and should seek prompt emergency care for fever signs of systemic infection. She should also seek care with animal or tick bites. If traveling to a malaria endemic area, she should take prophylaxis.     Potential late effects related to chemotherapy:    - She did develop peripheral neuropathy, notes that her feet and hands often feel cold. She manages these symptoms w/ warm socks, hand/foot warmers, and blankets. On gabapentin 2 tabs BID since 2018. She is satisfied with the symptom relief that she achieves with these interventions. Her newer symptoms (sharp shooting pain could be related to musculoskeletal changes in low back, metformin, or something else. Will start with a scan.     - She did develop cognitive changes on treatment. She reports short term memory loss which prompted her to retire from her position as a . Offered cognitive rehab which patient has declined.     General late effects screenings and recommendations  -Coping: much better!    -Cancer screening. She should undergo routine screening for women in her age group.     -Healthy lifestyle. She should maintain a healthy weight with a BMI between 20-25. She should exercise at least 150 minutes weekly at moderate intensity. She should see the eye doctor every 1-2 years, and dentist every 6 months for cleanings. She should not use any tobacco. She should minimize alcohol intake. If continuing to drink, should follow CDC recommendations for no more than 1 alcoholic drink/day for women.    Gave resources for our [facility] Cancer Survivorship class series and mailings list for educational opportunities. [email or web address]    Cancer treatment summary was sent electronically to the patient and her PCP.      The total time of this  encounter amounted to 60 minutes.This time included time spent with the patient, prep work, ordering tests, and performing post visit documentation.    April Torers, PhD, MPAS, PA-C  [facility]      Again, thank you for allowing me to participate in the care of your patient.        Sincerely,        April Torres PA-C    Electronically signed

## 2025-06-12 NOTE — NURSING NOTE
Current patient location: [address] [place] [address]    Is the patient currently in the state of [place]? YES    Visit mode: VIDEO    If the visit is dropped, the patient can be reconnected by:TELEPHONE VISIT: Phone number: [phone]    Will anyone else be joining the visit? NO  (If patient encounters technical issues they should call [phone]    Are changes needed to the allergy or medication list? No    Are refills needed on medications prescribed by this physician? NO    Rooming Documentation:  Questionnaire(s) completed    Reason for visit: RECHECK    Daiana MONTE

## 2025-06-15 ENCOUNTER — HEALTH MAINTENANCE LETTER (OUTPATIENT)
Age: 65
End: 2025-06-15

## 2025-06-19 ENCOUNTER — TELEPHONE (OUTPATIENT)
Dept: ONCOLOGY | Facility: CLINIC | Age: 65
End: 2025-06-19
Payer: COMMERCIAL

## 2025-06-19 NOTE — PROGRESS NOTES
CLINICAL NUTRITION SERVICES- ONCOLOGY DISTRESS SCREENING     Identified Concern and Score From Distress Screenin. How concerned are you about your ability to eat? :  0  2. How concerned are you about unintended weight loss or your current weight? : 3  9. If you want to be contacted by one of our professionals, I can send a message to them right now: Oncology Dietitian      Date of Distress Screenin/12     Findings: Brief phone call with Kitty. Reports that she is unable to talk at this time but would like RD contact info.      Follow-up Required: As needed. Sent [employer] message with introduction message and oncology RD phone number.     Nunu Hummel RD, LD  [phone]

## 2025-06-23 DIAGNOSIS — G62.0 DRUG-INDUCED POLYNEUROPATHY: ICD-10-CM

## 2025-06-23 RX ORDER — GABAPENTIN 100 MG/1
200 CAPSULE ORAL 2 TIMES DAILY
Qty: 360 CAPSULE | Refills: 0 | Status: SHIPPED | OUTPATIENT
Start: 2025-06-23

## 2025-06-26 ENCOUNTER — HOSPITAL ENCOUNTER (OUTPATIENT)
Dept: CT IMAGING | Facility: CLINIC | Age: 65
End: 2025-06-26
Attending: PHYSICIAN ASSISTANT
Payer: COMMERCIAL

## 2025-06-26 DIAGNOSIS — C25.9 PANCREATIC ADENOCARCINOMA (H): ICD-10-CM

## 2025-06-26 DIAGNOSIS — R10.12 LUQ ABDOMINAL PAIN: ICD-10-CM

## 2025-06-26 DIAGNOSIS — G62.9 NEUROPATHY: ICD-10-CM

## 2025-06-26 LAB
CREAT BLD-MCNC: 0.8 MG/DL (ref 0.5–1)
EGFRCR SERPLBLD CKD-EPI 2021: >60 ML/MIN/1.73M2

## 2025-06-26 PROCEDURE — 74177 CT ABD & PELVIS W/CONTRAST: CPT

## 2025-06-26 PROCEDURE — 250N000011 HC RX IP 250 OP 636: Performed by: PHYSICIAN ASSISTANT

## 2025-06-26 PROCEDURE — 82565 ASSAY OF CREATININE: CPT

## 2025-06-26 RX ORDER — IOPAMIDOL 755 MG/ML
90 INJECTION, SOLUTION INTRAVASCULAR ONCE
Status: COMPLETED | OUTPATIENT
Start: 2025-06-26 | End: 2025-06-26

## 2025-06-26 RX ADMIN — IOPAMIDOL 90 ML: 755 INJECTION, SOLUTION INTRAVENOUS at 10:46

## 2025-06-30 ENCOUNTER — RESULTS FOLLOW-UP (OUTPATIENT)
Dept: ONCOLOGY | Facility: CLINIC | Age: 65
End: 2025-06-30

## 2025-07-06 ENCOUNTER — HEALTH MAINTENANCE LETTER (OUTPATIENT)
Age: 65
End: 2025-07-06

## 2025-07-21 ENCOUNTER — TELEPHONE (OUTPATIENT)
Dept: DERMATOLOGY | Facility: CLINIC | Age: 65
End: 2025-07-21

## 2025-07-21 ENCOUNTER — OFFICE VISIT (OUTPATIENT)
Dept: DERMATOLOGY | Facility: CLINIC | Age: 65
End: 2025-07-21
Payer: COMMERCIAL

## 2025-07-21 DIAGNOSIS — L57.0 ACTINIC KERATOSIS: ICD-10-CM

## 2025-07-21 DIAGNOSIS — K12.1 STOMATITIS AND MUCOSITIS: ICD-10-CM

## 2025-07-21 DIAGNOSIS — Z12.83 SKIN CANCER SCREENING: Primary | ICD-10-CM

## 2025-07-21 DIAGNOSIS — N89.8 VAGINAL ITCHING: ICD-10-CM

## 2025-07-21 DIAGNOSIS — K12.30 STOMATITIS AND MUCOSITIS: ICD-10-CM

## 2025-07-21 DIAGNOSIS — N95.2 ATROPHIC VAGINITIS: ICD-10-CM

## 2025-07-21 DIAGNOSIS — L81.4 SOLAR LENTIGINOSIS: ICD-10-CM

## 2025-07-21 DIAGNOSIS — L82.1 SEBORRHEIC KERATOSES: ICD-10-CM

## 2025-07-21 DIAGNOSIS — D22.9 MULTIPLE PIGMENTED NEVI: ICD-10-CM

## 2025-07-21 PROCEDURE — 1126F AMNT PAIN NOTED NONE PRSNT: CPT | Performed by: PHYSICIAN ASSISTANT

## 2025-07-21 PROCEDURE — 17000 DESTRUCT PREMALG LESION: CPT | Performed by: PHYSICIAN ASSISTANT

## 2025-07-21 PROCEDURE — 99203 OFFICE O/P NEW LOW 30 MIN: CPT | Mod: 25 | Performed by: PHYSICIAN ASSISTANT

## 2025-07-21 RX ORDER — CLOBETASOL PROPIONATE 0.05 MG/G
GEL TOPICAL 2 TIMES DAILY
Qty: 30 G | Refills: 2 | Status: SHIPPED | OUTPATIENT
Start: 2025-07-21

## 2025-07-21 RX ORDER — CLOBETASOL PROPIONATE 0.5 MG/G
OINTMENT TOPICAL AT BEDTIME
Qty: 45 G | Refills: 1 | Status: SHIPPED | OUTPATIENT
Start: 2025-07-21

## 2025-07-21 ASSESSMENT — PAIN SCALES - GENERAL: PAINLEVEL_OUTOF10: NO PAIN (0)

## 2025-07-21 NOTE — PATIENT INSTRUCTIONS
Cryotherapy    What is it?  Use of a very cold liquid, such as liquid nitrogen, to freeze and destroy abnormal skin cells that need to be removed    What should I expect?  Tenderness and redness  A small blister that might grow and fill with dark purple blood. There may be crusting.  More than one treatment may be needed if the lesions do not go away.    How do I care for the treated area?  Gently wash the area with your hands when bathing.  Use a thin layer of Vaseline to help with healing. You may use a Band-Aid.   The area should heal within 7-10 days and may leave behind a pink or lighter color.   Do not use an antibiotic or Neosporin ointment.   You may take acetaminophen (Tylenol) for pain.     Call your doctor if you have:  Severe pain  Signs of infection (warmth, redness, cloudy yellow drainage, and or a bad smell)  Questions or concerns    Who should I call with questions?      [facility]: [phone]      [facility]: [phone]      For urgent needs outside of business hours call the [facility] at [phone] and ask for the dermatology resident on call       Checking for Skin Cancer  You can help find cancer early by checking your skin each month. There are 3 main kinds of skin cancer: melanoma, basal cell carcinoma, and squamous cell carcinoma. Doing monthly skin checks is the best way to find new marks, sores, or skin changes. Follow these instructions for checking your skin.   The ABCDEs of checking moles for melanoma   Check your moles or growths for signs of melanoma using ABCDE:   Asymmetry: The sides of the mole or growth don t match.  Border: The edges are ragged, notched, or blurred.  Color: The color within the mole or growth varies. It could be black, brown, tan, white, or shades of red, gray, or blue.  Diameter: The mole or growth is larger than   inch or 6 mm (size of a pencil eraser).  Evolving: The size, shape,  texture, or color of the mole or growth is changing.     ABCDE's of moles on light skin.        ABCDE's of moles on dark skin may be harder to identify.     Checking for other types of skin cancer  Basal cell carcinoma or squamous cell carcinoma cause symptoms like:     A spot or mole that looks different from all other marks on your skin  Changes in how an area feels, such as itching, tenderness, or pain  Changes in the skin's surface, such as oozing, bleeding, or scaliness  A sore that doesn't heal  New swelling, redness, or spread of color beyond the border of a mole    Who s at risk?  Anyone of any skin color can get skin cancer. But you're at greater risk if you have:   Fair skin that freckles easily and burns instead of tanning  Light-colored or red hair  Light-colored eyes  Many moles or abnormal moles on your skin  A long history of unprotected exposure to sunlight or tanning beds  A history of many blistering sunburns as a child or teen  A family history of skin cancer  Been exposed to radiation or chemicals  A weakened immune system  Been exposed to arsenic  If you've had skin cancer in the past, you're at high risk of having it again.   How to check your skin  Do your monthly skin checkups in front of a full-length mirror. Use a room with good lighting so it's easier to see. Use a hand mirror to look at hard-to-see places like your buttocks and back. You can also have a trusted friend or family member help you with these checks. Check every part of your body, including your:   Head (ears, face, neck, and scalp)  Torso (front, back, sides, and under breasts)  Arms (tops, undersides, and armpits)  Hands (palms, backs, and fingers, including under the nails)  Lower back, buttocks, and genitals  Legs (front, back, and sides)  Feet (tops, soles, toes, including under the nails, and between toes)  Watch for new spots on your skin or a spot that's changing in color, shape, size.   If you have a lot of moles,  take digital photos of them each month. Make sure to take photos both up close and from a distance. These can help you see if any moles change over time.   Know your skin  Most skin changes aren't cancer. But if you see any changes in your skin, call your healthcare provider right away. Only they can tell you if a change is a problem. If you have skin cancer, seeing your provider can be the first step to getting the treatment that could save your life.   [employer] last reviewed this educational content on 10/1/2021    2549-3469 [employer]. All rights reserved. This information is not intended as a substitute for professional medical care. Always follow your healthcare professional's instructions.

## 2025-07-21 NOTE — PROGRESS NOTES
[facility] Dermatology Note  Encounter Date: Jul 21, 2025  Office Visit     Dermatology Problem List:  Skin cancer screening 7/21/25      Lichen planus, treated with clobetasol as well as in the vulvar area, uses clobetasol cream.  ____________________________________________    Assessment & Plan:    # Actinic keratosis x 1 left upper lip.  - Cryotherapy performed today (see procedure note(s) below).    # Skin cancer screening with multiple benign nevi, solar lentigines    - ABCDEs: Counseled ABCDEs of melanoma: Asymmetry, Border (irregularity), Color (not uniform, changes in color), Diameter (greater than 6 mm which is about the size of a pencil eraser), and Evolving (any changes in preexisting moles).  - Sun protection: Counseled SPF30+ sunscreen, UPF clothing, sun avoidance, tanning bed avoidance.    # Cherry angiomas and seborrheic keratoses (including growth on right medial shoulder/ right lower neck),  Benign, reassurance given.       # Lichen planus, treated with clobetasol as well as in the vulvar area vs atrophic vaginitis, uses clobetasol ointment once monthly.  -LP fairly well controlled and follows ENT  Refills provided for the patient.     Procedures Performed:   - Cryotherapy procedure note, location(s): left upper lip x 1. After verbal consent and discussion of risks and benefits including, but not limited to, dyspigmentation/scar, blister, and pain, 1 lesion(s) was(were) treated with 1-2 mm freeze border for 1-2 cycles with liquid nitrogen. Post cryotherapy instructions were provided.      Follow-up: 1 year(s) in-person, or earlier for new or changing lesions    Staff :      All risks, benefits and alternatives were discussed with patient.  Patient is in agreement and understands the assessment and plan.  All questions were answered.  Sun Screen Education was given.   Return to Clinic annually or sooner as needed.   Madelin Guillen PA-C       ____________________________________________    CC: Derm Problem (Patient is here for skin check. Notes areas of concern on upper lip and right shoulder.)    HPI:  Ms. Kitty Bangura is a(n) 64 year old female who presents today as a new patient for skin check.    Enjoys lots of time outdoors including Golf, gardening, hiking, walking.   Uses sunscreen but could be better.     Used to use a tanning bed 40+ years ago intermittently (5+ times)    No family hx of skin cancer .   Personal hx of pancreatic cancer treated with chemotherapy and surgery.   Personal hx of lichen planus, treated with clobetasol as well as in the vulvar area, uses clobetasol cream.      Has a dry spot on the left upper lip and noticed spot on her right shoulder had gradually gotten larger.     Patient is otherwise feeling well, without additional skin concerns.    Labs Reviewed:  N/A    Physical Exam:  Vitals: There were no vitals taken for this visit.  SKIN: Full skin, which includes the head/face, both arms, chest, back, abdomen,both legs, genitalia and/or groin buttocks, digits and/or nails, was examined.  - There are faint whitish plaques on the right lateral tongue, posteriorly. Bl posterior buccal mucosa.   - There is an erythematous macule with overyling adherent scale on the left upper lip x 1  There are dome shaped bright red papules on the trunk.   Multiple regular brown pigmented macules and papules are identified on the trunk and extremities.   Scattered brown macules on sun exposed areas.  There are waxy stuck on tan to brown papules on the trunk and extremities. ..   - No atrophic plaques to the perineum or vulvar area.   - No other lesions of concern on areas examined.     Medications:  Current Outpatient Medications   Medication Sig Dispense Refill    acetaminophen (TYLENOL) 500 MG tablet Take 2 tablets (1,000 mg) by mouth every 8 hours 100 tablet 1    alcohol swab prep pads Use to swab area of injection/avel as  directed. 100 each 3    amoxicillin-clavulanate (AUGMENTIN) 875-125 MG tablet If fever of 100.4 or higher, take 1 tablet and seek medical care immediately (Patient not taking: Reported on 12/17/2024) 1 tablet 0    blood glucose (ONETOUCH ULTRA) test strip USE 1 STRIP TO CHECK GLUCOSE TWICE DAILY OR  AS  DIRECTED 100 strip 2    blood glucose (ONETOUCH VERIO IQ) test strip Use to test blood sugar 4 times daily or as directed. 400 each 3    blood glucose calibration (NO BRAND SPECIFIED) solution To accompany: Blood Glucose Monitor Brands: per insurance. 1 each 3    blood glucose monitoring (NO BRAND SPECIFIED) meter device kit Use to test blood sugar 2 times daily or as directed. Preferred blood glucose meter OR supplies to accompany: Blood Glucose Monitor Brands: per insurance. 1 kit 0    clobetasol (TEMOVATE) 0.05 % GEL topical gel Apply topically 2 times daily 30 g 1    clobetasol (TEMOVATE) 0.05 % GEL topical gel Apply topically 2 times daily 15 g 1    gabapentin (NEURONTIN) 100 MG capsule TAKE TWO CAPSULES BY MOUTH TWICE DAILY 360 capsule 0    lipase-protease-amylase (CREON) 01867-12481-634167 units CPEP per EC capsule Take 5 capsules with meals and 2-3 capsules with snacks, up to 24 tabs a day 2160 capsule 4    metFORMIN (GLUCOPHAGE XR) 500 MG 24 hr tablet Take 1 tablet (500 mg) by mouth 2 times daily (with meals). 180 tablet 1    ondansetron (ZOFRAN) 4 MG tablet Take 1 tablet (4 mg) by mouth every 6 hours as needed for nausea. 20 tablet 1    OneTouch Delica Lancets 33G MISC 4 lancets daily 150 each 3    thin (NO BRAND SPECIFIED) lancets Use with lanceting device. To accompany: Blood Glucose Monitor Brands: per insurance. 100 each 6     No current facility-administered medications for this visit.     Facility-Administered Medications Ordered in Other Visits   Medication Dose Route Frequency Provider Last Rate Last Admin    heparin 100 UNIT/ML injection 5 mL  5 mL Intracatheter Once Art Guevara MD           Past Medical History:   Patient Active Problem List   Diagnosis    Leukocytosis    Fatty liver    Pancreatic adenocarcinoma (H)    Anemia    Thrombocytopenia    Type 2 diabetes mellitus without complication, without long-term current use of insulin (H)    Drug-induced polyneuropathy    s/p LLL wedge - 7/28    Asplenia after surgical procedure    Oral lichen planus    Leiomyoma of uterus, unspecified    Esophageal dysphagia    Nausea with vomiting     Past Medical History:   Diagnosis Date    Arthritis Post chemo    Suspected    Depressive disorder     have had therapy    Diabetes (H)     History of blood transfusion     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 A few calcifications are noted along the lateral margin of the mass.    Pneumonia     2016    PONV (postoperative nausea and vomiting)         CC Referred Self, MD  No address on file on close of this encounter.

## 2025-07-21 NOTE — NURSING NOTE
Dermatology Rooming Note    Kitty Bangura's goals for this visit include:   Chief Complaint   Patient presents with    Derm Problem     Patient is here for skin check. Notes areas of concern on upper lip and right shoulder.     Priya Spring [facility]

## 2025-07-21 NOTE — TELEPHONE ENCOUNTER
[facility] Call Center    Phone Message    May a detailed message be left on voicemail: yes     Reason for Call: Medication Question or concern regarding medication   Prescription Clarification  Name of Medication: clobetasol (TEMOVATE) 0.05 % GEL topical gel    Prescribing Provider: Mindy   Pharmacy: [facility] [address] [facility] [address]     What on the order needs clarification? Pharmacy is needing clarification on the gel. They only have the external use only gel. Please call pharmacy back to discuss. Thanks.       Action Taken: Message routed to:  [facility] derm    Travel Screening: Not Applicable     Date of Service:

## 2025-07-21 NOTE — LETTER
7/21/2025       RE: Kitty Bangura  [address] [place] [id number]     Dear Colleague,    Thank you for referring your patient, Kitty Bangura, to the [facility] [place] at [facility]. Please see a copy of my visit note below.      [facility] Dermatology Note  Encounter Date: Jul 21, 2025  Office Visit     Dermatology Problem List:  Skin cancer screening 7/21/25      Lichen planus, treated with clobetasol as well as in the vulvar area, uses clobetasol cream.  ____________________________________________    Assessment & Plan:    # Actinic keratosis x 1 left upper lip.  - Cryotherapy performed today (see procedure note(s) below).    # Skin cancer screening with multiple benign nevi, solar lentigines    - ABCDEs: Counseled ABCDEs of melanoma: Asymmetry, Border (irregularity), Color (not uniform, changes in color), Diameter (greater than 6 mm which is about the size of a pencil eraser), and Evolving (any changes in preexisting moles).  - Sun protection: Counseled SPF30+ sunscreen, UPF clothing, sun avoidance, tanning bed avoidance.    # Cherry angiomas and seborrheic keratoses (including growth on right medial shoulder/ right lower neck),  Benign, reassurance given.       # Lichen planus, treated with clobetasol as well as in the vulvar area vs atrophic vaginitis, uses clobetasol ointment once monthly.  -LP fairly well controlled and follows ENT  Refills provided for the patient.     Procedures Performed:   - Cryotherapy procedure note, location(s): left upper lip x 1. After verbal consent and discussion of risks and benefits including, but not limited to, dyspigmentation/scar, blister, and pain, 1 lesion(s) was(were) treated with 1-2 mm freeze border for 1-2 cycles with liquid nitrogen. Post cryotherapy instructions were provided.      Follow-up: 1 year(s) in-person, or earlier for new or changing  lesions    Staff :      All risks, benefits and alternatives were discussed with patient.  Patient is in agreement and understands the assessment and plan.  All questions were answered.  Sun Screen Education was given.   Return to Clinic annually or sooner as needed.   Madelin Guillen PA-C      ____________________________________________    CC: Derm Problem (Patient is here for skin check. Notes areas of concern on upper lip and right shoulder.)    HPI:  Ms. Kitty Bangura is a(n) 64 year old female who presents today as a new patient for skin check.    Enjoys lots of time outdoors including Golf, gardening, hiking, walking.   Uses sunscreen but could be better.     Used to use a tanning bed 40+ years ago intermittently (5+ times)    No family hx of skin cancer .   Personal hx of pancreatic cancer treated with chemotherapy and surgery.   Personal hx of lichen planus, treated with clobetasol as well as in the vulvar area, uses clobetasol cream.      Has a dry spot on the left upper lip and noticed spot on her right shoulder had gradually gotten larger.     Patient is otherwise feeling well, without additional skin concerns.    Labs Reviewed:  N/A    Physical Exam:  Vitals: There were no vitals taken for this visit.  SKIN: Full skin, which includes the head/face, both arms, chest, back, abdomen,both legs, genitalia and/or groin buttocks, digits and/or nails, was examined.  - There are faint whitish plaques on the right lateral tongue, posteriorly. Bl posterior buccal mucosa.   - There is an erythematous macule with overyling adherent scale on the left upper lip x 1  There are dome shaped bright red papules on the trunk.   Multiple regular brown pigmented macules and papules are identified on the trunk and extremities.   Scattered brown macules on sun exposed areas.  There are waxy stuck on tan to brown papules on the trunk and extremities. ..   - No atrophic plaques to the perineum or vulvar area.   - No other  lesions of concern on areas examined.     Medications:  Current Outpatient Medications   Medication Sig Dispense Refill     acetaminophen (TYLENOL) 500 MG tablet Take 2 tablets (1,000 mg) by mouth every 8 hours 100 tablet 1     alcohol swab prep pads Use to swab area of injection/avel as directed. 100 each 3     amoxicillin-clavulanate (AUGMENTIN) 875-125 MG tablet If fever of 100.4 or higher, take 1 tablet and seek medical care immediately (Patient not taking: Reported on 12/17/2024) 1 tablet 0     blood glucose (ONETOUCH ULTRA) test strip USE 1 STRIP TO CHECK GLUCOSE TWICE DAILY OR  AS  DIRECTED 100 strip 2     blood glucose (ONETOUCH VERIO IQ) test strip Use to test blood sugar 4 times daily or as directed. 400 each 3     blood glucose calibration (NO BRAND SPECIFIED) solution To accompany: Blood Glucose Monitor Brands: per insurance. 1 each 3     blood glucose monitoring (NO BRAND SPECIFIED) meter device kit Use to test blood sugar 2 times daily or as directed. Preferred blood glucose meter OR supplies to accompany: Blood Glucose Monitor Brands: per insurance. 1 kit 0     clobetasol (TEMOVATE) 0.05 % GEL topical gel Apply topically 2 times daily 30 g 1     clobetasol (TEMOVATE) 0.05 % GEL topical gel Apply topically 2 times daily 15 g 1     gabapentin (NEURONTIN) 100 MG capsule TAKE TWO CAPSULES BY MOUTH TWICE DAILY 360 capsule 0     lipase-protease-amylase (CREON) 03367-10436-677696 units CPEP per EC capsule Take 5 capsules with meals and 2-3 capsules with snacks, up to 24 tabs a day 2160 capsule 4     metFORMIN (GLUCOPHAGE XR) 500 MG 24 hr tablet Take 1 tablet (500 mg) by mouth 2 times daily (with meals). 180 tablet 1     ondansetron (ZOFRAN) 4 MG tablet Take 1 tablet (4 mg) by mouth every 6 hours as needed for nausea. 20 tablet 1     OneTouch Delica Lancets 33G MISC 4 lancets daily 150 each 3     thin (NO BRAND SPECIFIED) lancets Use with lanceting device. To accompany: Blood Glucose Monitor Brands: per  insurance. 100 each 6     No current facility-administered medications for this visit.     Facility-Administered Medications Ordered in Other Visits   Medication Dose Route Frequency Provider Last Rate Last Admin     heparin 100 UNIT/ML injection 5 mL  5 mL Intracatheter Once Art Guevara MD          Past Medical History:   Patient Active Problem List   Diagnosis     Leukocytosis     Fatty liver     Pancreatic adenocarcinoma (H)     Anemia     Thrombocytopenia     Type 2 diabetes mellitus without complication, without long-term current use of insulin (H)     Drug-induced polyneuropathy     s/p LLL wedge - 7/28     Asplenia after surgical procedure     Oral lichen planus     Leiomyoma of uterus, unspecified     Esophageal dysphagia     Nausea with vomiting     Past Medical History:   Diagnosis Date     Arthritis Post chemo    Suspected     Depressive disorder     have had therapy     Diabetes (H)      History of blood transfusion      History of total splenectomy 04/17/2018     Necrotizing pancreatitis      Necrotizing pancreatitis 12/09/2017     Pancreatic cancer (H)      Pancreatic mass 11/01/2017    On CT abd/pelvis: 3 cm heterogeneous mass within the tail of the pancreas. A few calcifications are noted along the lateral margin of the mass.     Pneumonia     2016     PONV (postoperative nausea and vomiting)         CC Referred Self, MD  No address on file on close of this encounter.     Again, thank you for allowing me to participate in the care of your patient.      Sincerely,    Madelin Guillen PA-C

## 2025-07-23 NOTE — TELEPHONE ENCOUNTER
I spoke with pharmacy staff and clarified below    MERLE Junior     [facility]    Chief Complaint   Patient presents with     detention Acute       [facility] Medical Record Number:  [id number]    HPI:    Ban Petersen is a 70 year old  (1948), who is being seen today for an episodic care visit at [facility].  [place] [facility] [place]. PMH TBI, cognitive impairment and right sided paralysis admitted on 5/30/18 after mechanical fall at home, found to have right proximal humerus fracture . Fracture was dicussed with ortho service who recommend conservative non-operative management, sling for comfort, and ortho follow up.     Admitted to this facility for  rehab, medical management and nursing care.     HPI information obtained from: facility chart records, facility staff, patient report and [facility] chart review.    Today's concern is:  HPI is challenging, patient is rambling    Closed fracture of proximal end of right humerus, unspecified fracture morphology, sequela  This is her flaccid arm s/p TBI. See chronic pain. Bruising and swelling improved    H/O traumatic brain injury  Cognitive impairment  Paralysis (H)  Lives with her two brothers who are very involved. They have had several conversations with staff about how to care for Wen. She is currently max assist for transfers and ADLs. BIMS 7/15. CPT 3.33. Legally blind. She has been happier since her family brought in her large TV. Social work held a care conference. Family says patient is not back to baseline, she apparently could transfer independently before.    Seizure disorder (H)  Chronic. No witnessed seizures since admission. Dilantin level checked here was 5.4, previous level in clinic was 10.2. She has been on current dilantin dose for some time it appears.    Slow transit constipation  Patient unable to say if bowels are moving. Nursing has no acute concerns    Chronic pain syndrome  Family had requested scheduled pain medication. Per chart review,  "patient was on scheduled Tylenol #3 at home. This was restarted 6/21/18. Nursing reports improved pain, no complaints/yelling out from patient as before. Sleeping well. No sedation noted.      ALLERGIES: No known drug allergies  Past Medical, Surgical, Family and Social History reviewed and updated in [facility].    Current Outpatient Prescriptions   Medication Sig Dispense Refill     Acetaminophen (TYLENOL PO) Take 975 mg by mouth every 6 hours as needed for mild pain or fever       acetaminophen-codeine (TYLENOL #3) 300-30 MG per tablet Take 2 tablets by mouth 2 times daily And 1 tab at noon 10 tablet 0     Calcium Carb-Cholecalciferol 600-200 MG-UNIT TABS Take 1 tablet by mouth daily       gabapentin (NEURONTIN) 400 MG capsule TAKE 1 CAPSULE(400 MG) BY MOUTH TWICE DAILY 180 capsule 3     levETIRAcetam (KEPPRA XR) 750 MG 24 hr tablet TAKE 1 TABLET BY MOUTH DAILY 90 tablet 1     LORazepam (ATIVAN) 0.5 MG tablet Take 30 minutes prior to eye visits with injections as needed, may repeat in 1-4 hours as needed. 30 tablet 1     multivitamin, therapeutic with minerals (MULTI-VITAMIN) TABS Take 1 tablet by mouth 2 times daily Ocuvite       ORDER FOR DME One pair of knee high compression stockings of medium compression.  Mid-calf circumference is 30cm. 1 each 0     Phenytoin Sodium Extended 200 MG CAPS Take 200 mg by mouth daily 90 capsule 3     polyethylene glycol (MIRALAX/GLYCOLAX) Packet Take 17 g by mouth daily as needed for constipation       senna-docusate (SENOKOT-S;PERICOLACE) 8.6-50 MG per tablet Take 1 tablet by mouth daily       Medications reviewed:  Medications reconciled to facility chart and changes were made to reflect current medications as identified as above med list.     REVIEW OF SYSTEMS:  Unobtainable secondary to cognitive impairment.     Physical Exam:  /68  Pulse 56  Temp 97.4  F (36.3  C)  Resp 20  Ht 5' 5\" (1.651 m)  Wt 151 lb (68.5 kg)  SpO2 94%  BMI 25.13 kg/m2   GENERAL APPEARANCE:  " Alert, watching TV, no apparent distress  ENT:  Mouth and posterior oropharynx normal, moist mucous membranes  EYES:  EOM normal, Conjunctiva and lids normal, difficulty opening R eye  NECK:  No adenopathy,masses or thyromegaly  RESP:  respiratory effort and palpation of chest normal, lungs clear to auscultation , no respiratory distress  CV:  Palpation and auscultation of heart done , regular rate and rhythm, no murmur, rub, or gallop  ABDOMEN:  normal bowel sounds, soft, nontender, no hepatosplenomegaly or other masses  SKIN:  Resolving bruises R arm  PSYCH:  oriented to self, insight and judgement impaired, memory impaired       Recent Labs:     CBC RESULTS:   Recent Labs   Lab Test  04/10/18   1647  09/26/17   1603   WBC  6.1  6.9   RBC  4.25  4.21   HGB  13.8  13.7   HCT  42.6  41.9   MCV  100  100   MCH  32.5  32.5   MCHC  32.4  32.7   RDW  12.6  12.7   PLT  210  229       Last Basic Metabolic Panel:  Recent Labs   Lab Test  04/10/18   1647  09/26/17   1603   NA  143  143   POTASSIUM  4.9  4.6   CHLORIDE  112*  110*   MILA  9.7  9.6   CO2  22  22   BUN  19  18   CR  0.71  0.60   GLC  84  75       Liver Function Studies -   Recent Labs   Lab Test  04/10/18   1647  09/26/17   1603   PROTTOTAL  7.5  7.3   ALBUMIN  3.8  3.7   BILITOTAL  0.2  0.2   ALKPHOS  83  85   AST  15  17   ALT  12  19       TSH   Date Value Ref Range Status   06/13/2018 3.81 0.30 - 5.00 uIU/mL Final   03/30/2011 0.77 0.4 - 5.0 mU/L Final       Assessment/Plan:  (S42.201S) Closed fracture of proximal end of right humerus, unspecified fracture morphology, sequela  (primary encounter diagnosis)  Comment: Non-surgical treatment.   Plan: PT/OT eval and treat, discharge planning per their recommendation. F/u ortho as scheduled. Discharge planning per social work    (Z87.820) H/O traumatic brain injury  (R41.89) Cognitive impairment  Comment: Mental status likely at baseline. Goal is to increase independence with transfers then discharge home  Plan:  PT/OT eval and treat, discharge planning per their recommendations.    (G83.9) Paralysis (H)  Comment: Chronic R sided due to TBI. Wheelchair bound. Family provides ADL assistance.  Plan: Continue current POC with no changes at this time and adjustments as needed.    (G40.909) Seizure disorder (H)  Comment: Chronic condition being managed with medications.  Plan: Continue current POC with no changes at this time and adjustments as needed. F/u PCP    (K59.01) Slow transit constipation  Comment: Chronic condition being managed with medications.  Plan: Cont Senna-S 1 tab qhs. Cont Miralax prn. Monitor bowel function. Adjust medication as clinically indicated.    (G89.4) Chronic pain syndrome  Comment: Per chart review, patient has been on Tylenol #3 for some time. Family indicated that it was scheduled TID even though the noon dose was documented as prn, so would presume they were giving it TID. Agree that she cannot express a need for medication.   Plan: Cont Tylenol #3 2 tabs BID and 1 tab at noon. Monitor for AMS, sedation, constipation        Electronically signed by  MILES Kumar [facility]  Pager: [phone]

## 2025-07-23 NOTE — TELEPHONE ENCOUNTER
Can you let Usman know : we use the clobetasol gel for mucosal lesions as well. There isn't an oral version.   This is a refill from [facility].    Thanks,  Madelin Guillen PA-C

## 2025-08-05 ENCOUNTER — HOSPITAL ENCOUNTER (OUTPATIENT)
Dept: MAMMOGRAPHY | Facility: CLINIC | Age: 65
Discharge: HOME | End: 2025-08-05
Attending: NURSE PRACTITIONER
Payer: COMMERCIAL

## 2025-08-05 DIAGNOSIS — Z12.31 VISIT FOR SCREENING MAMMOGRAM: ICD-10-CM

## 2025-08-05 PROCEDURE — 77063 BREAST TOMOSYNTHESIS BI: CPT

## (undated) DEVICE — SUCTION MANIFOLD DORNOCH ULTRA CART UL-CL500

## (undated) DEVICE — SU VICRYL 0 CT-1 CR 8X18" J740D

## (undated) DEVICE — DRAPE MAYO STAND 23X54 8337

## (undated) DEVICE — TIES BANDING T50R

## (undated) DEVICE — NDL COUNTER 20CT 31142493

## (undated) DEVICE — SU VICRYL 3-0 SH CR 8X18" J774

## (undated) DEVICE — ENDO BITE BLOCK ADULT OMNI-BLOC

## (undated) DEVICE — GOWN XLG DISP 9545

## (undated) DEVICE — ENDO NDL ASPIRATION ULTRASOUND 22GA ACQUIRE M00555540

## (undated) DEVICE — SU MONOCRYL 4-0 PS-2 27" UND Y426H

## (undated) DEVICE — KIT ENDO FIRST STEP DISINFECTANT 200ML W/POUCH EP-4

## (undated) DEVICE — SU VICRYL 3-0 SH 27" UND J416H

## (undated) DEVICE — SU VICRYL 0 UR-6 27" J603H

## (undated) DEVICE — SOL WATER IRRIG 1000ML BOTTLE 2F7114

## (undated) DEVICE — LINEN TOWEL PACK X5 5464

## (undated) DEVICE — WIRE GUIDE ROADRUNNER X-SUPPORT .018X480CM STR TIP G22419

## (undated) DEVICE — SU VICRYL 2-0 SH 27" UND J417H

## (undated) DEVICE — ESU ELEC BLADE 2.75" COATED/INSULATED E1455

## (undated) DEVICE — KNIFE HANDLE W/15 BLADE 371615

## (undated) DEVICE — PACK ENDOSCOPY GI CUSTOM UMMC

## (undated) DEVICE — SU VICRYL 3-0 SH 27" J316H

## (undated) DEVICE — Device

## (undated) DEVICE — SU SILK 4-0 TIE 12X30" A303H

## (undated) DEVICE — ENDO FORCEP BX CAPTURA PRO SPIKE G50696

## (undated) DEVICE — VESSEL LOOPS YELLOW MAXI 31145694

## (undated) DEVICE — TUBING SUCTION 10'X3/16" N510

## (undated) DEVICE — SU SILK 0 TIE 6X30" A306H

## (undated) DEVICE — SU MONOCRYL 4-0 P-3 18" UND Y494G

## (undated) DEVICE — SPONGE LAP 18X18" X8435

## (undated) DEVICE — JELLY LUBRICATING SURGILUBE 2OZ TUBE

## (undated) DEVICE — CATH BALLOON ELATION ESOPH/PYLORIC 12-13.5-15MMX180CM EPB12

## (undated) DEVICE — ENDO DISSECTOR KITTNER CIGARETTE ROLL4"X4" 15505/25

## (undated) DEVICE — SU PROLENE 5-0 RB-1DA 36"  8556H

## (undated) DEVICE — CATH TRAY FOLEY SURESTEP 16FR W/URNE MTR STLK LATEX A303316A

## (undated) DEVICE — ENDO CAP AND TUBING STERILE FOR ENDOGATOR  100130

## (undated) DEVICE — STPL LINEAR 30MM V3 TA30V3S

## (undated) DEVICE — SU UMBILICAL TAPE .125X30" U11T

## (undated) DEVICE — SUCTION MANIFOLD NEPTUNE 2 SYS 4 PORT 0702-020-000

## (undated) DEVICE — ADH SKIN CLOSURE PREMIERPRO EXOFIN 1.0ML 3470

## (undated) DEVICE — GOWN IMPERVIOUS 2XL BLUE

## (undated) DEVICE — DAVINCI XI RETR ENDOWRIST SMALL GRAPTOR 470318

## (undated) DEVICE — LINEN TOWEL PACK X6 WHITE 5487

## (undated) DEVICE — ENDO TUBING CO2 SMARTCAP STERILE DISP 100145CO2EXT

## (undated) DEVICE — ENDO PROBE COVER ULTRASOUND BALLOON LATEX  MAJ-249

## (undated) DEVICE — DRAPE SHEET MED 44X70" 9355

## (undated) DEVICE — CLIP HORIZON MED BLUE 002200

## (undated) DEVICE — DAVINCI XI REDUCER 8-12MM 470381

## (undated) DEVICE — INFLATION DEVICE BIG 60 ENDO-AN6012

## (undated) DEVICE — ENDO VALVE SUCTION BRONCH EVIS MAJ-209

## (undated) DEVICE — NDL INSUFFLATION 120MM VERRES 172015

## (undated) DEVICE — ENDO NDL BALL TIP ULTRASOUND 22GA 5.2FR ECHO-3-22

## (undated) DEVICE — DAVINCI SEAL CANNULA AND STPL 12MM 470380

## (undated) DEVICE — ENDO CATH BALLOON DILATION HURRICANE 06MMX4X180CM M00545920

## (undated) DEVICE — SUCTION TIP POOLE K770

## (undated) DEVICE — SNARE CAPIVATOR II POLYPECTOMY 15X240MM M00561230

## (undated) DEVICE — NDL SCLEROTHERAPY 25GA CARR-LOCK  00711811

## (undated) DEVICE — STPL ENDO RELOAD 45MM VASCULAR MEDIUM TAN EGIA45AVM

## (undated) DEVICE — DRAIN CHEST TUBE 28FR STR 8028

## (undated) DEVICE — SUTURE BOOTS 051003PBX

## (undated) DEVICE — GLOVE PROTEXIS POWDER FREE SMT 7.5  2D72PT75X

## (undated) DEVICE — ANTIFOG SOLUTION W/FOAM PAD 31142527

## (undated) DEVICE — TAPE CLOTH 3" CARDINAL 3TRCL03

## (undated) DEVICE — DAVINCI XI ENDOWRIST STAPLER RELOAD 30MM BLUE 48630B

## (undated) DEVICE — APPLICATOR SPRAY TIP PROGEL PGEN005-1

## (undated) DEVICE — ESU LIGASURE IMPACT OPEN SEALER/DVDR CVD LG JAW LF4418

## (undated) DEVICE — DEVICE RETRIEVAL ROTH NET PLATINUM UNIV 2.5MMX230CM 00715050

## (undated) DEVICE — BLADE KNIFE SURG 10 371110

## (undated) DEVICE — DRAPE IOBAN INCISE 23X17" 6650EZ

## (undated) DEVICE — ENDO VALVE BX EVIS MAJ-210

## (undated) DEVICE — LINEN TOWEL PACK X30 5481

## (undated) DEVICE — BASIN SET SINGLE STERILE 13752-624

## (undated) DEVICE — SU PROLENE 4-0 SHDA 36" 8521H

## (undated) DEVICE — KIT INTRODUCER FLUENT MICRO 5FRX10CM ECHO TIP KIT-038-04

## (undated) DEVICE — DRSG PRIMAPORE 02X3" 7133

## (undated) DEVICE — SYSTEM CLEARIFY VISUALIZATION 21-345

## (undated) DEVICE — SUCTION DRY CHEST DRAIN OASIS 3600-100

## (undated) DEVICE — DAVINCI ENDOWRIST STAPLER 45 SHEATH 410370

## (undated) DEVICE — BLADE KNIFE SURG 15 371115

## (undated) DEVICE — CLIP HORIZON SM RED WIDE SLOT 001201

## (undated) DEVICE — GLOVE PROTEXIS POWDER FREE SMT 6.5  2D72PT65X

## (undated) DEVICE — SYR 30ML SLIP TIP W/O NDL 302833

## (undated) DEVICE — SOL NACL 0.9% INJ 250ML BAG 2B1322Q

## (undated) DEVICE — SURGICEL HEMOSTAT 4X8" 1952

## (undated) DEVICE — SU PROLENE 4-0 RB-1DA 36" 8557H

## (undated) DEVICE — WIPES FOLEY CARE SURESTEP PROVON DFC100

## (undated) DEVICE — SU PDS II 0 TP-1 60" Z991G

## (undated) DEVICE — LUBRICANT INST ELECTROLUBE EL101

## (undated) DEVICE — SPONGE KITTNER 30-101

## (undated) DEVICE — DRAIN JACKSON PRATT ROUND SIL 19FR W/TROCAR LF JP-2232

## (undated) DEVICE — KIT CONNECTOR FOR OLYMPUS ENDOSCOPES DEFENDO 100310

## (undated) DEVICE — DAVINCI XI OBTURATOR BLADELESS 8MM 470359

## (undated) DEVICE — SYR EAR BULB 3OZ 0035830

## (undated) DEVICE — DRAIN PENROSE 3/8X18" LATEX 0918020

## (undated) DEVICE — ESU GROUND PAD ADULT W/CORD E7507

## (undated) DEVICE — STENT ESU AXIOS W/DEL SYS 15MMX10MM 10.8FR 138CM M00553650: Type: IMPLANTABLE DEVICE | Site: STOMACH | Status: NON-FUNCTIONAL

## (undated) DEVICE — PREP CHLORAPREP 26ML TINTED ORANGE  260815

## (undated) DEVICE — DAVINCI XI ESU BIPOLAR LONG 78DEG ANG ENDOWRIST EXT 471400

## (undated) DEVICE — ENDO TROCAR CONMED AIRSEAL BLADELESS 12X120MM IAS12-120LP

## (undated) DEVICE — SOL NACL 0.9% IRRIG 1000ML BOTTLE 2F7124

## (undated) DEVICE — SU SILK 2-0 TIE 12X30" A305H

## (undated) DEVICE — DECANTER BAG 2002S

## (undated) DEVICE — TUBING INSUFFLATION W/FILTER CPC TO LUER 620-030-301

## (undated) DEVICE — WIRE GUIDE 0.025"X450CM ANG VISIGLIDE G-240-2545A

## (undated) DEVICE — GLOVE EXAM NITRILE LG PF LATEX FREE 5064

## (undated) DEVICE — GLOVE SENSICARE 7.5 MSG1075 LATEX FREE

## (undated) DEVICE — DAVINCI XI DRAPE ARM 470015

## (undated) DEVICE — APPLICATOR COTTON TIP 6"X2 STERILE LF 6012

## (undated) DEVICE — DAVINCI XI DRAPE COLUMN 470341

## (undated) DEVICE — ENDO POUCH UNIV RETRIEVAL SYSTEM INZII 10MM CD001

## (undated) DEVICE — PAD CHUX UNDERPAD 23X24" 7136

## (undated) DEVICE — ENDO CONNECTOR ENDOGATOR AUX WATER JET FOR OLYMPUS SCOPE

## (undated) DEVICE — GLOVE PROTEXIS W/NEU-THERA 7.0  2D73TE70

## (undated) DEVICE — CATH BALLOON ELATION ESOPH/PYLORIC 6-7-8MMX180CM EPB6

## (undated) DEVICE — CATH BALLOON ELATION PULM 5.5CM 12-13.5-15MMX100CM P12L55

## (undated) DEVICE — GLOVE PROTEXIS POWDER FREE SMT 7.0  2D72PT70X

## (undated) DEVICE — GLOVE PROTEXIS BLUE W/NEU-THERA 7.0  2D73EB70

## (undated) DEVICE — PACK CENTRAL LINE INSERTION SAN32CLFCG

## (undated) DEVICE — ENDO LIGATOR UNIV 6 BAND G31917 MBL-U-6

## (undated) DEVICE — DAVINCI XI FCP CADIERE 8MM ENDOWRIST 471049

## (undated) DEVICE — BLADE CLIPPER SGL USE 9680

## (undated) DEVICE — SU SILK 3-0 SH CR 8X18" C013D

## (undated) DEVICE — DAVINCI XI SEAL UNIVERSAL 5-8MM 470361

## (undated) DEVICE — SUCTION CATH AIRLIFE TRI-FLO W/CONTROL PORT 14FR  T60C

## (undated) DEVICE — WIRE GUIDE TRACER METRO DIRECT .021"X260CM STR TIP G55705

## (undated) DEVICE — STPL ENDO HANDLE GIA ULTRA SHORT EGIAUSHORT

## (undated) DEVICE — SU PROLENE 6-0 RB-2DA 30" 8711H

## (undated) DEVICE — SU DERMABOND ADVANCED .7ML DNX12

## (undated) DEVICE — COVER ULTRASOUND PROBE W/GEL FLEXI-FEEL 6"X58" LF  25-FF658

## (undated) DEVICE — DRAIN JACKSON PRATT RESERVOIR 100ML SU130-1305

## (undated) DEVICE — ENDO TROCAR BLUNT TIP KII BALLOON 12X100MM C0R47

## (undated) DEVICE — TUBING CONMED AIRSEAL SMOKE EVAC INSUFFLATION ASM-EVAC

## (undated) DEVICE — CLIP HORIZON LG ORANGE 004200

## (undated) DEVICE — CATH BALLOON MERIT ESOPH FIVE-STAGE 17X21MMX180CM EX18

## (undated) DEVICE — ESU GROUND PAD ADULT REM W/15' CORD E7507DB

## (undated) DEVICE — ESU PENCIL W/COATED BLADE E2450H

## (undated) DEVICE — LINEN GOWN XLG 5407

## (undated) DEVICE — SYR 30ML LL W/O NDL 302832

## (undated) DEVICE — SU SILK 0 SH 30" K834H

## (undated) DEVICE — KIT ENDO TURNOVER/PROCEDURE CARRY-ON 101822

## (undated) DEVICE — SU SILK 3-0 TIE 12X30" A304H

## (undated) DEVICE — SPECIMEN CONTAINER 3OZ W/FORMALIN 59901

## (undated) DEVICE — WIRE GUIDE 0.025"X450CM STR VISIGLIDE G-240-2545S

## (undated) DEVICE — BIOPSY VALVE BIOSHIELD 00711135

## (undated) DEVICE — SU ETHILON 3-0 PS-1 18" 1663H

## (undated) DEVICE — GUIDEWIRE NOVAGOLD .018X260CM STR TIP M00552000

## (undated) DEVICE — SNARE CAPIVATOR II POLYPECTOMY 10X240MM M00561220

## (undated) RX ORDER — PROPOFOL 10 MG/ML
INJECTION, EMULSION INTRAVENOUS
Status: DISPENSED
Start: 2018-01-12

## (undated) RX ORDER — EPHEDRINE SULFATE 50 MG/ML
INJECTION, SOLUTION INTRAMUSCULAR; INTRAVENOUS; SUBCUTANEOUS
Status: DISPENSED
Start: 2021-07-28

## (undated) RX ORDER — HYDROMORPHONE HYDROCHLORIDE 1 MG/ML
INJECTION, SOLUTION INTRAMUSCULAR; INTRAVENOUS; SUBCUTANEOUS
Status: DISPENSED
Start: 2018-04-17

## (undated) RX ORDER — SIMETHICONE 20 MG/.3ML
EMULSION ORAL
Status: DISPENSED
Start: 2017-12-04

## (undated) RX ORDER — GABAPENTIN 300 MG/1
CAPSULE ORAL
Status: DISPENSED
Start: 2019-07-08

## (undated) RX ORDER — FENTANYL CITRATE 50 UG/ML
INJECTION, SOLUTION INTRAMUSCULAR; INTRAVENOUS
Status: DISPENSED
Start: 2017-12-04

## (undated) RX ORDER — FENTANYL CITRATE-0.9 % NACL/PF 10 MCG/ML
PLASTIC BAG, INJECTION (ML) INTRAVENOUS
Status: DISPENSED
Start: 2021-07-28

## (undated) RX ORDER — PHENYLEPHRINE HCL IN 0.9% NACL 1 MG/10 ML
SYRINGE (ML) INTRAVENOUS
Status: DISPENSED
Start: 2018-05-08

## (undated) RX ORDER — ACETAMINOPHEN 325 MG/1
TABLET ORAL
Status: DISPENSED
Start: 2019-07-08

## (undated) RX ORDER — FENTANYL CITRATE 50 UG/ML
INJECTION, SOLUTION INTRAMUSCULAR; INTRAVENOUS
Status: DISPENSED
Start: 2017-11-29

## (undated) RX ORDER — PROPOFOL 10 MG/ML
INJECTION, EMULSION INTRAVENOUS
Status: DISPENSED
Start: 2018-01-25

## (undated) RX ORDER — HEPARIN SODIUM (PORCINE) LOCK FLUSH IV SOLN 100 UNIT/ML 100 UNIT/ML
SOLUTION INTRAVENOUS
Status: DISPENSED
Start: 2018-04-02

## (undated) RX ORDER — SCOLOPAMINE TRANSDERMAL SYSTEM 1 MG/1
PATCH, EXTENDED RELEASE TRANSDERMAL
Status: DISPENSED
Start: 2017-11-29

## (undated) RX ORDER — FENTANYL CITRATE 50 UG/ML
INJECTION, SOLUTION INTRAMUSCULAR; INTRAVENOUS
Status: DISPENSED
Start: 2018-04-17

## (undated) RX ORDER — PROPOFOL 10 MG/ML
INJECTION, EMULSION INTRAVENOUS
Status: DISPENSED
Start: 2017-12-04

## (undated) RX ORDER — LIDOCAINE HYDROCHLORIDE 20 MG/ML
INJECTION, SOLUTION EPIDURAL; INFILTRATION; INTRACAUDAL; PERINEURAL
Status: DISPENSED
Start: 2021-07-28

## (undated) RX ORDER — SODIUM CHLORIDE, SODIUM LACTATE, POTASSIUM CHLORIDE, CALCIUM CHLORIDE 600; 310; 30; 20 MG/100ML; MG/100ML; MG/100ML; MG/100ML
INJECTION, SOLUTION INTRAVENOUS
Status: DISPENSED
Start: 2018-04-17

## (undated) RX ORDER — PROPOFOL 10 MG/ML
INJECTION, EMULSION INTRAVENOUS
Status: DISPENSED
Start: 2018-04-17

## (undated) RX ORDER — SCOLOPAMINE TRANSDERMAL SYSTEM 1 MG/1
PATCH, EXTENDED RELEASE TRANSDERMAL
Status: DISPENSED
Start: 2018-04-17

## (undated) RX ORDER — FENTANYL CITRATE 50 UG/ML
INJECTION, SOLUTION INTRAMUSCULAR; INTRAVENOUS
Status: DISPENSED
Start: 2021-07-28

## (undated) RX ORDER — FENTANYL CITRATE 50 UG/ML
INJECTION, SOLUTION INTRAMUSCULAR; INTRAVENOUS
Status: DISPENSED
Start: 2018-05-08

## (undated) RX ORDER — CEFAZOLIN SODIUM 2 G/100ML
INJECTION, SOLUTION INTRAVENOUS
Status: DISPENSED
Start: 2021-07-28

## (undated) RX ORDER — GLYCOPYRROLATE 0.2 MG/ML
INJECTION, SOLUTION INTRAMUSCULAR; INTRAVENOUS
Status: DISPENSED
Start: 2018-04-17

## (undated) RX ORDER — PHENYLEPHRINE HCL IN 0.9% NACL 1 MG/10 ML
SYRINGE (ML) INTRAVENOUS
Status: DISPENSED
Start: 2018-12-11

## (undated) RX ORDER — LIDOCAINE HYDROCHLORIDE 20 MG/ML
INJECTION, SOLUTION EPIDURAL; INFILTRATION; INTRACAUDAL; PERINEURAL
Status: DISPENSED
Start: 2018-01-12

## (undated) RX ORDER — LIDOCAINE HYDROCHLORIDE 20 MG/ML
INJECTION, SOLUTION EPIDURAL; INFILTRATION; INTRACAUDAL; PERINEURAL
Status: DISPENSED
Start: 2018-05-08

## (undated) RX ORDER — LIDOCAINE HYDROCHLORIDE 20 MG/ML
INJECTION, SOLUTION EPIDURAL; INFILTRATION; INTRACAUDAL; PERINEURAL
Status: DISPENSED
Start: 2018-01-25

## (undated) RX ORDER — HEPARIN SODIUM (PORCINE) LOCK FLUSH IV SOLN 100 UNIT/ML 100 UNIT/ML
SOLUTION INTRAVENOUS
Status: DISPENSED
Start: 2019-07-01

## (undated) RX ORDER — HYDROMORPHONE HCL IN WATER/PF 6 MG/30 ML
PATIENT CONTROLLED ANALGESIA SYRINGE INTRAVENOUS
Status: DISPENSED
Start: 2021-07-28

## (undated) RX ORDER — HEPARIN SODIUM (PORCINE) LOCK FLUSH IV SOLN 100 UNIT/ML 100 UNIT/ML
SOLUTION INTRAVENOUS
Status: DISPENSED
Start: 2018-03-13

## (undated) RX ORDER — LIDOCAINE 40 MG/G
CREAM TOPICAL
Status: DISPENSED
Start: 2018-04-17

## (undated) RX ORDER — EPHEDRINE SULFATE 50 MG/ML
INJECTION, SOLUTION INTRAMUSCULAR; INTRAVENOUS; SUBCUTANEOUS
Status: DISPENSED
Start: 2018-05-08

## (undated) RX ORDER — INDOMETHACIN 50 MG/1
SUPPOSITORY RECTAL
Status: DISPENSED
Start: 2018-01-25

## (undated) RX ORDER — BUPIVACAINE HYDROCHLORIDE 2.5 MG/ML
INJECTION, SOLUTION EPIDURAL; INFILTRATION; INTRACAUDAL
Status: DISPENSED
Start: 2021-07-28

## (undated) RX ORDER — LIDOCAINE HYDROCHLORIDE 20 MG/ML
INJECTION, SOLUTION EPIDURAL; INFILTRATION; INTRACAUDAL; PERINEURAL
Status: DISPENSED
Start: 2017-11-29

## (undated) RX ORDER — GLYCOPYRROLATE 0.2 MG/ML
INJECTION, SOLUTION INTRAMUSCULAR; INTRAVENOUS
Status: DISPENSED
Start: 2018-05-08

## (undated) RX ORDER — HEPARIN SODIUM (PORCINE) LOCK FLUSH IV SOLN 100 UNIT/ML 100 UNIT/ML
SOLUTION INTRAVENOUS
Status: DISPENSED
Start: 2018-05-08

## (undated) RX ORDER — DEXAMETHASONE SODIUM PHOSPHATE 4 MG/ML
INJECTION, SOLUTION INTRA-ARTICULAR; INTRALESIONAL; INTRAMUSCULAR; INTRAVENOUS; SOFT TISSUE
Status: DISPENSED
Start: 2018-05-08

## (undated) RX ORDER — SCOLOPAMINE TRANSDERMAL SYSTEM 1 MG/1
PATCH, EXTENDED RELEASE TRANSDERMAL
Status: DISPENSED
Start: 2018-01-25

## (undated) RX ORDER — HYDROMORPHONE HYDROCHLORIDE 1 MG/ML
INJECTION, SOLUTION INTRAMUSCULAR; INTRAVENOUS; SUBCUTANEOUS
Status: DISPENSED
Start: 2021-07-28

## (undated) RX ORDER — PROPOFOL 10 MG/ML
INJECTION, EMULSION INTRAVENOUS
Status: DISPENSED
Start: 2021-07-28

## (undated) RX ORDER — DIPHENHYDRAMINE HYDROCHLORIDE 50 MG/ML
INJECTION INTRAMUSCULAR; INTRAVENOUS
Status: DISPENSED
Start: 2017-11-29

## (undated) RX ORDER — ONDANSETRON 2 MG/ML
INJECTION INTRAMUSCULAR; INTRAVENOUS
Status: DISPENSED
Start: 2018-01-12

## (undated) RX ORDER — GLYCOPYRROLATE 0.2 MG/ML
INJECTION, SOLUTION INTRAMUSCULAR; INTRAVENOUS
Status: DISPENSED
Start: 2018-12-11

## (undated) RX ORDER — ONDANSETRON 2 MG/ML
INJECTION INTRAMUSCULAR; INTRAVENOUS
Status: DISPENSED
Start: 2018-01-25

## (undated) RX ORDER — DEXTROSE MONOHYDRATE, SODIUM CHLORIDE, AND POTASSIUM CHLORIDE 50; 1.49; 9 G/1000ML; G/1000ML; G/1000ML
INJECTION, SOLUTION INTRAVENOUS
Status: DISPENSED
Start: 2018-01-25

## (undated) RX ORDER — PROPOFOL 10 MG/ML
INJECTION, EMULSION INTRAVENOUS
Status: DISPENSED
Start: 2017-11-29

## (undated) RX ORDER — EPHEDRINE SULFATE 50 MG/ML
INJECTION, SOLUTION INTRAMUSCULAR; INTRAVENOUS; SUBCUTANEOUS
Status: DISPENSED
Start: 2017-11-29

## (undated) RX ORDER — ERTAPENEM 1 G/1
INJECTION, POWDER, LYOPHILIZED, FOR SOLUTION INTRAMUSCULAR; INTRAVENOUS
Status: DISPENSED
Start: 2018-04-17

## (undated) RX ORDER — IOPAMIDOL 510 MG/ML
INJECTION, SOLUTION INTRAVASCULAR
Status: DISPENSED
Start: 2017-12-04

## (undated) RX ORDER — SCOLOPAMINE TRANSDERMAL SYSTEM 1 MG/1
PATCH, EXTENDED RELEASE TRANSDERMAL
Status: DISPENSED
Start: 2017-12-12

## (undated) RX ORDER — FENTANYL CITRATE 50 UG/ML
INJECTION, SOLUTION INTRAMUSCULAR; INTRAVENOUS
Status: DISPENSED
Start: 2018-01-25

## (undated) RX ORDER — LEVOFLOXACIN 5 MG/ML
INJECTION, SOLUTION INTRAVENOUS
Status: DISPENSED
Start: 2017-12-04

## (undated) RX ORDER — DEXMEDETOMIDINE HYDROCHLORIDE 100 UG/ML
INJECTION, SOLUTION INTRAVENOUS
Status: DISPENSED
Start: 2021-07-28

## (undated) RX ORDER — ROCURONIUM BROMIDE 50 MG/5 ML
SYRINGE (ML) INTRAVENOUS
Status: DISPENSED
Start: 2017-11-29

## (undated) RX ORDER — ALBUTEROL SULFATE 0.83 MG/ML
SOLUTION RESPIRATORY (INHALATION)
Status: DISPENSED
Start: 2021-07-20

## (undated) RX ORDER — HEPARIN SODIUM (PORCINE) LOCK FLUSH IV SOLN 100 UNIT/ML 100 UNIT/ML
SOLUTION INTRAVENOUS
Status: DISPENSED
Start: 2018-12-21

## (undated) RX ORDER — FENTANYL CITRATE 50 UG/ML
INJECTION, SOLUTION INTRAMUSCULAR; INTRAVENOUS
Status: DISPENSED
Start: 2017-12-12

## (undated) RX ORDER — SODIUM CHLORIDE 9 MG/ML
INJECTION, SOLUTION INTRAVENOUS
Status: DISPENSED
Start: 2018-04-17

## (undated) RX ORDER — FENTANYL CITRATE 50 UG/ML
INJECTION, SOLUTION INTRAMUSCULAR; INTRAVENOUS
Status: DISPENSED
Start: 2018-01-12

## (undated) RX ORDER — ONDANSETRON 2 MG/ML
INJECTION INTRAMUSCULAR; INTRAVENOUS
Status: DISPENSED
Start: 2021-07-28

## (undated) RX ORDER — ONDANSETRON 2 MG/ML
INJECTION INTRAMUSCULAR; INTRAVENOUS
Status: DISPENSED
Start: 2018-04-17

## (undated) RX ORDER — ONDANSETRON 2 MG/ML
INJECTION INTRAMUSCULAR; INTRAVENOUS
Status: DISPENSED
Start: 2018-05-08

## (undated) RX ORDER — DEXAMETHASONE SODIUM PHOSPHATE 4 MG/ML
INJECTION, SOLUTION INTRA-ARTICULAR; INTRALESIONAL; INTRAMUSCULAR; INTRAVENOUS; SOFT TISSUE
Status: DISPENSED
Start: 2017-11-29

## (undated) RX ORDER — ACETAMINOPHEN 325 MG/1
TABLET ORAL
Status: DISPENSED
Start: 2018-01-12

## (undated) RX ORDER — LIDOCAINE HYDROCHLORIDE 20 MG/ML
JELLY TOPICAL
Status: DISPENSED
Start: 2024-05-23

## (undated) RX ORDER — GABAPENTIN 300 MG/1
CAPSULE ORAL
Status: DISPENSED
Start: 2018-01-12

## (undated) RX ORDER — HEPARIN SODIUM (PORCINE) LOCK FLUSH IV SOLN 100 UNIT/ML 100 UNIT/ML
SOLUTION INTRAVENOUS
Status: DISPENSED
Start: 2019-03-29

## (undated) RX ORDER — SCOLOPAMINE TRANSDERMAL SYSTEM 1 MG/1
PATCH, EXTENDED RELEASE TRANSDERMAL
Status: DISPENSED
Start: 2018-05-08

## (undated) RX ORDER — LIDOCAINE HYDROCHLORIDE 20 MG/ML
INJECTION, SOLUTION EPIDURAL; INFILTRATION; INTRACAUDAL; PERINEURAL
Status: DISPENSED
Start: 2018-04-17

## (undated) RX ORDER — PHENYLEPHRINE HCL IN 0.9% NACL 1 MG/10 ML
SYRINGE (ML) INTRAVENOUS
Status: DISPENSED
Start: 2018-04-17

## (undated) RX ORDER — PHENYLEPHRINE HCL IN 0.9% NACL 1 MG/10 ML
SYRINGE (ML) INTRAVENOUS
Status: DISPENSED
Start: 2018-01-25

## (undated) RX ORDER — DEXAMETHASONE SODIUM PHOSPHATE 4 MG/ML
INJECTION, SOLUTION INTRA-ARTICULAR; INTRALESIONAL; INTRAMUSCULAR; INTRAVENOUS; SOFT TISSUE
Status: DISPENSED
Start: 2018-04-17

## (undated) RX ORDER — DEXAMETHASONE SODIUM PHOSPHATE 10 MG/ML
INJECTION, SOLUTION INTRAMUSCULAR; INTRAVENOUS
Status: DISPENSED
Start: 2021-07-28

## (undated) RX ORDER — ONDANSETRON 2 MG/ML
INJECTION INTRAMUSCULAR; INTRAVENOUS
Status: DISPENSED
Start: 2017-12-04

## (undated) RX ORDER — PHENYLEPHRINE HCL IN 0.9% NACL 1 MG/10 ML
SYRINGE (ML) INTRAVENOUS
Status: DISPENSED
Start: 2017-11-29